# Patient Record
Sex: MALE | Race: WHITE | Employment: FULL TIME | URBAN - METROPOLITAN AREA
[De-identification: names, ages, dates, MRNs, and addresses within clinical notes are randomized per-mention and may not be internally consistent; named-entity substitution may affect disease eponyms.]

---

## 2017-01-11 ENCOUNTER — OFFICE VISIT (OUTPATIENT)
Dept: VASCULAR SURGERY | Age: 57
End: 2017-01-11

## 2017-01-11 VITALS
WEIGHT: 191 LBS | RESPIRATION RATE: 22 BRPM | SYSTOLIC BLOOD PRESSURE: 140 MMHG | HEIGHT: 70 IN | HEART RATE: 96 BPM | BODY MASS INDEX: 27.35 KG/M2 | DIASTOLIC BLOOD PRESSURE: 82 MMHG

## 2017-01-11 DIAGNOSIS — I10 ESSENTIAL HYPERTENSION: ICD-10-CM

## 2017-01-11 DIAGNOSIS — I77.9 AORTO-ILIAC DISEASE (HCC): ICD-10-CM

## 2017-01-11 DIAGNOSIS — I70.213 ATHEROSCLEROSIS OF NATIVE ARTERY OF BOTH LOWER EXTREMITIES WITH INTERMITTENT CLAUDICATION (HCC): Primary | ICD-10-CM

## 2017-01-11 DIAGNOSIS — Z87.891 FORMER SMOKER: ICD-10-CM

## 2017-01-11 DIAGNOSIS — M48.061 SPINAL STENOSIS, LUMBAR REGION, WITHOUT NEUROGENIC CLAUDICATION: ICD-10-CM

## 2017-01-11 NOTE — MR AVS SNAPSHOT
Visit Information Date & Time Provider Department Dept. Phone Encounter #  
 1/11/2017  1:00 PM Alina Hanley, [address] [facility] [phone] [id number] Your Appointments 1/18/2017  8:40 AM  
Follow Up with Kalpesh Mason MD  
[facility] [place] ([facility]) Appt Note: 4 week f/u for lower back  75.00cp 2012 [facility] [address]  
[phone]  
  
   
 2012 [facility]  
  
    
 2/10/2017  1:00 PM  
ROUTINE CARE with Danita Gerber MD  
[facility] ([facility]) Appt Note: 4 month F/U  
 [address] 500 [facility] [address] 200 [facility]  
  
    
 2/13/2017  9:30 AM  
Follow Up with Shweta Garnett MD  
[facility] ([facility] SCHEDULING) Appt Note: FU AFTER CTA AND LEG AT [facility] ON 1/24/17  
 [address] [facility] [address] [place] [address] [address] [address]  
[phone]  
  
   
 [facility] [address] [address] Upcoming Health Maintenance Date Due COLONOSCOPY 7/23/2020 DTaP/Tdap/Td series (2 - Td) 7/5/2026 Allergies as of 1/11/2017  Review Complete On: 1/11/2017 By: Curtis Hernández LPN No Known Allergies Current Immunizations  Reviewed on 7/5/2016 Name Date Tdap 7/5/2016 Not reviewed this visit You Were Diagnosed With   
  
 Codes Comments Atherosclerosis of native artery of both lower extremities with intermittent claudication ([facility]    -  Primary ICD-10-CM: C17.115 ICD-9-CM: 440.21 Aorto-iliac disease ([facility]     ICD-10-CM: I74.09 
ICD-9-CM: 444.09 Vitals BP Pulse Resp Height(growth percentile) Weight(growth percentile) BMI  
 140/82 (BP 1 Location: Left arm, BP Patient Position: Sitting) 96 22 5' 10\" (1.778 m) 191 lb (86.6 kg) 27.41 kg/m2 Smoking Status Former Smoker Vitals History BMI and BSA Data Body Mass Index Body Surface Area  
 27.41 kg/m 2 2.07 m 2 Preferred Pharmacy Pharmacy Name Phone [facility], [address] Your Updated Medication List  
  
   
This list is accurate as of: 1/11/17  1:33 PM.  Always use your most recent med list. amLODIPine 10 mg tablet Commonly known as:  Alise Darting Take 1 Tab by mouth daily. avanafil 100 mg tablet Commonly known as:  SUMLWPY Take 1 Tab by mouth as needed for Other. Take 1 tab 15-30 mins prior to sexual acitivity  
  
 cyanocobalamin 1,000 mcg tablet Commonly known as:  VITAMIN B-12 Take 1 Tab by mouth daily. DULoxetine 60 mg capsule Commonly known as:  CYMBALTA Take 1 Cap by mouth daily. hydroCHLOROthiazide 25 mg tablet Commonly known as:  HYDRODIURIL Take 1 Tab by mouth daily. losartan 100 mg tablet Commonly known as:  COZAAR Take 1 Tab by mouth daily. methylPREDNISolone 4 mg tablet Commonly known as:  MEDROL (SHAHEEN) Per dose pack instructions POTASSIUM PHOSPHATE PO Take  by mouth. traMADol 50 mg tablet Commonly known as:  ULTRAM  
Take 1 Tab by mouth every six (6) hours as needed for Pain. Max Daily Amount: 200 mg. To-Do List   
 01/13/2017 Imaging:  CTA ABD ART W RUNOFF W WO CONT   
  
 01/13/2017 Imaging:  LOWER EXT ART PVR MULT LEVEL SEG PRESSURES   
  
 01/24/2017 9:00 AM  
  Appointment with [facility] 1 at [facility] ([phone]) DIET RESTRICTIONS  Nothing to eat or drink 4 hours prior to study May have water to take meds  GENERAL INSTRUCTIONS  If you were given medications to take for a contrast allergy prior to having this study, please arrange to have someone drive you to your appointment.  If you have had a creatinine level drawn within the past 30 days, please bring most recent results to your appt. This study does not require you to drink contrast prior to your study. MEDICATIONS  Bring a complete list of all medications you are currently taking to include prescriptions, over-the-counter meds, herbals, vitamins & any dietary supplements. OUTSIDE FILMS  Bring outside films, CDs, and reports related to the study with you on the day of your exam.  QUESTIONS  Notify the CT Department if you have any questions concerning your study. Gregory Patel [address] [place] [address] [phone]  
  
 01/24/2017 10:00 AM  
  Appointment with [facility] at [facility] ([phone]) All patients under the age of 15 should be referred to [address] [place] [address]. No preparation is required for this study. Patient can have their meals and take their medications. Please fax prescription/order to [facility] - [phone]. Please report to the Registration Department located in the [facility], [address] [place] [address], [address] [place] [address] at least 15 minutes prior to your appointment time. Referral Information Referral ID Referred By Referred To  
  
 [id number] Reynold Counts L Not Available Visits Status Start Date End Date 1 New Request 1/11/17 1/11/18 If your referral has a status of pending review or denied, additional information will be sent to support the outcome of this decision. Introducing hospitals & HEALTH SERVICES! Lon Lopez introduces [employer] patient portal. Now you can access parts of your medical record, email your doctor's office, and request medication refills online. 1. In your internet browser, go to [email or web address] 2. Click on the First Time User? Click Here link in the Sign In box. You will see the New Member Sign Up page. 3. Enter your [employer] Access Code exactly as it appears below. You will not need to use this code after youve completed the sign-up process.  If you do not sign up before the expiration date, you must request a new code. · [email or web address] Access Code: [email or web address] Expires: 4/11/2017 12:44 PM 
 
4. Enter the last four digits of your Social Security Number (xxxx) and Date of Birth (mm/dd/yyyy) as indicated and click Submit. You will be taken to the next sign-up page. 5. Create a [email or web address] ID. This will be your [email or web address] login ID and cannot be changed, so think of one that is secure and easy to remember. 6. Create a [email or web address] password. You can change your password at any time. 7. Enter your Password Reset Question and Answer. This can be used at a later time if you forget your password. 8. Enter your e-mail address. You will receive e-mail notification when new information is available in [address] 9. Click Sign Up. You can now view and download portions of your medical record. 10. Click the Download Summary menu link to download a portable copy of your medical information. If you have questions, please visit the Frequently Asked Questions section of the [email or web address] website. Remember, [email or web address] is NOT to be used for urgent needs. For medical emergencies, dial 911. Now available from your iPhone and Android! Please provide this summary of care documentation to your next provider. Your primary care clinician is listed as Adriana Cooper. If you have any questions after today's visit, please call [phone].

## 2017-01-11 NOTE — PROGRESS NOTES
Jair Rao    Chief Complaint   Patient presents with   [facility] Patient       History and Physical    Mr Pop Agustin is referred by his physiatrist after findings on an MRI of probable aorto iliac disease  He has had several years of pain with ambulation. He could walk unlimited distances without problems but over the past couple of years has had progressive debility due to pain in legs. It is described as being in his calves but can be felt throughout the entire leg. He said the muscles just get tired and cramp. He can stop and sit and after a few minutes it resolves. It started on the left initially but has progressed to now involve the right leg. He is a heavy  and has had to limit his walking on the job due to his limitations. Fortunately his employer has been accommodating. But he can now only tolerate about < 1/2 block of ambulation    He does have true lumbar spinal stenosis but the MRI did suggest these vascular findings prompting this visit  He confesses he smoked for over 40 years, but he did in fact quit about 7 years ago. He is treated for HTN but denies diabetes or high cholesterol.  He denies any heart problems    Past Medical History   Diagnosis Date    Essential hypertension     Lumbar canal stenosis 05/04/2015     Dr. Ro Suresh     Patient Active Problem List   Diagnosis Code    Hypertension I10    Vitamin B12 deficiency E53.8    Low back pain M54.5    DDD (degenerative disc disease), lumbar M51.36    Erectile dysfunction N52.9    Lumbar neuritis M54.16    Spinal stenosis, lumbar region, without neurogenic claudication M48.06    Hereditary peripheral neuropathy G60.9    Spinal stenosis, lumbar M48.06    Aortic valve stenosis I35.0    Hereditary and idiopathic neuropathy, unspecified G60.9    Radiculopathy, lumbar region M54.16     Past Surgical History   Procedure Laterality Date    Hx colonoscopy  07/23/2015     polyps repeat 2020 5 years Tom Henderson     Current Outpatient Prescriptions   Medication Sig Dispense Refill    DULoxetine (CYMBALTA) 60 mg capsule Take 1 Cap by mouth daily. 30 Cap 1    traMADol (ULTRAM) 50 mg tablet Take 1 Tab by mouth every six (6) hours as needed for Pain. Max Daily Amount: 200 mg. 20 Tab 0    methylPREDNISolone (MEDROL, SHAHEEN,) 4 mg tablet Per dose pack instructions 1 Dose Pack 0    amLODIPine (NORVASC) 10 mg tablet Take 1 Tab by mouth daily. 90 Tab 3    cyanocobalamin (VITAMIN B-12) 1,000 mcg tablet Take 1 Tab by mouth daily. 90 Tab 3    hydrochlorothiazide (HYDRODIURIL) 25 mg tablet Take 1 Tab by mouth daily. 90 Tab 3    losartan (COZAAR) 100 mg tablet Take 1 Tab by mouth daily. 90 Tab 3    avanafil (STENDRA) 100 mg tablet Take 1 Tab by mouth as needed for Other. Take 1 tab 15-30 mins prior to sexual acitivity 12 Tab 0    POTASSIUM PHOS,M-BASIC-D-BASIC (POTASSIUM PHOSPHATE PO) Take  by mouth. No Known Allergies  Social History     Social History    Marital status: LEGALLY      Spouse name: N/A    Number of children: N/A    Years of education: N/A     Occupational History    Not on file. Social History Main Topics    Smoking status: Former Smoker    Smokeless tobacco: Never Used    Alcohol use 15.0 oz/week     30 Cans of beer per week      Comment: 6 beers a day    Drug use:  Yes    Sexual activity: Yes     Partners: Female     Other Topics Concern    Not on file     Social History Narrative      Family History   Problem Relation Age of Onset    Heart Disease Father        Review of Systems    Review of Systems - History obtained from the patient  General ROS: negative  Psychological ROS: negative  Ophthalmic ROS: negative  Respiratory ROS: negative  Cardiovascular ROS: negative  Gastrointestinal ROS: negative  Musculoskeletal ROS: radiculopathy from lumbar stenosis  Neurological ROS: peripheral neuropathy  Dermatological ROS: negative  Vascular ROS: claudication as per HPI        Physical Exam:    Visit Vitals    BP 140/82 (BP 1 Location: Left arm, BP Patient Position: Sitting)    Pulse 96    Resp 22    Ht 5' 10\" (1.778 m)    Wt 191 lb (86.6 kg)    BMI 27.41 kg/m2      General:  Alert, cooperative, no distress. Head:  Normocephalic, without obvious abnormality, atraumatic. Eyes:    Conjunctivae/corneas clear. Pupils equal, round, reactive to light. Extraocular movements intact. Neck:         No obvious bruits, no JVD   Lungs:   Clear to auscultation bilaterally. Heart:  Regular rate and rhythm, S1, S2 normal   Extremities: Extremities normal, atraumatic, no cyanosis or edema. Pulses: Distal pulses nonpalpable. Skin: Skin color, texture, turgor normal. No rashes or lesions. Vascular studies:  MRI: Significant aortic stenosis which is progressed since prior imaging. There  is possible occlusion of the left common iliac artery which may been present  previously. Correlation with dedicated imaging of the aorta is recommended such  as CTA    Impression and Plan:  1. Atherosclerosis of native artery of both lower extremities with intermittent claudication ([address]    2. Aorto-iliac disease ([address]    3. Essential hypertension    4. Former smoker    5. Spinal stenosis, lumbar region, without neurogenic claudication      Orders Placed This Encounter    CTA ABD ART W RUNOFF W WO CONT    LOWER EXT ART PVR MULT LEVEL SEG PRESSURES     Clinical symptoms discussed and correlated to PAD. Given age, smoking history, and findings on MRI, seems typical aorto - iliac occlusive disease  He has had no actual vascular studies to determine true severity of disease so will get leg arterial study but also do CTA with runoff. May likely do better long term with aortic bypass but need visualization with angiographic images and CTA should offer suitable evaluation for surgical planning. I did discuss endovascular options as a possibility as well.  Further discussion/recommendations though pending studies, at which point will have him follow up with Dr Karla Lau. Literature was provided today. I did suggest starting to take daily 81mg chewable aspirin. JUNE Casillas    Portions of this note have been created using voice recognition software.

## 2017-01-24 ENCOUNTER — HOSPITAL ENCOUNTER (OUTPATIENT)
Dept: VASCULAR SURGERY | Age: 57
Discharge: HOME | End: 2017-01-24
Attending: PHYSICIAN ASSISTANT
Payer: COMMERCIAL

## 2017-01-24 ENCOUNTER — HOSPITAL ENCOUNTER (OUTPATIENT)
Dept: CT IMAGING | Age: 57
Discharge: HOME | End: 2017-01-24
Attending: PHYSICIAN ASSISTANT
Payer: COMMERCIAL

## 2017-01-24 DIAGNOSIS — I77.9 AORTO-ILIAC DISEASE (HCC): ICD-10-CM

## 2017-01-24 DIAGNOSIS — I70.213 ATHEROSCLEROSIS OF NATIVE ARTERY OF BOTH LOWER EXTREMITIES WITH INTERMITTENT CLAUDICATION (HCC): ICD-10-CM

## 2017-01-24 LAB — CREAT UR-MCNC: 0.8 MG/DL (ref 0.6–1.3)

## 2017-01-24 PROCEDURE — 74011636320 HC RX REV CODE- 636/320: Performed by: PHYSICIAN ASSISTANT

## 2017-01-24 PROCEDURE — 75635 CT ANGIO ABDOMINAL ARTERIES: CPT

## 2017-01-24 PROCEDURE — 93925 LOWER EXTREMITY STUDY: CPT

## 2017-01-24 PROCEDURE — 82565 ASSAY OF CREATININE: CPT

## 2017-01-24 RX ADMIN — IOPAMIDOL 120 ML: 755 INJECTION, SOLUTION INTRAVENOUS at 10:09

## 2017-01-25 ENCOUNTER — OFFICE VISIT (OUTPATIENT)
Dept: VASCULAR SURGERY | Age: 57
End: 2017-01-25

## 2017-01-25 VITALS
HEIGHT: 70 IN | WEIGHT: 191 LBS | SYSTOLIC BLOOD PRESSURE: 140 MMHG | DIASTOLIC BLOOD PRESSURE: 88 MMHG | BODY MASS INDEX: 27.35 KG/M2 | HEART RATE: 100 BPM | RESPIRATION RATE: 22 BRPM

## 2017-01-25 DIAGNOSIS — I70.213 ATHEROSCLEROSIS OF NATIVE ARTERY OF BOTH LOWER EXTREMITIES WITH INTERMITTENT CLAUDICATION (HCC): ICD-10-CM

## 2017-01-25 DIAGNOSIS — I74.09 AORTOILIAC OCCLUSIVE DISEASE (HCC): Primary | ICD-10-CM

## 2017-01-25 DIAGNOSIS — I73.9 PAD (PERIPHERAL ARTERY DISEASE) (HCC): ICD-10-CM

## 2017-01-25 NOTE — PROGRESS NOTES
Richelle Chapa    Chief Complaint   Patient presents with    Leg Pain       History and Physical    Richelle Chapa is a 60 y.o. male with bilateral lower extremity claudication. As well as back pain. He has no fevers or chills. No rest pain. No ulcerations. Overall he is very worried about his current status. He is here for discussion of his CTA. Past Medical History   Diagnosis Date    Essential hypertension     Lumbar canal stenosis 05/04/2015     Dr. Almyra Castleman     Past Surgical History   Procedure Laterality Date    Hx colonoscopy  07/23/2015     polyps repeat 2020 5 years Jovanny Henderson      Patient Active Problem List   Diagnosis Code    Hypertension I10    Vitamin B12 deficiency E53.8    Low back pain M54.5    DDD (degenerative disc disease), lumbar M51.36    Erectile dysfunction N52.9    Lumbar neuritis M54.16    Spinal stenosis, lumbar region, without neurogenic claudication M48.06    Hereditary peripheral neuropathy G60.9    Spinal stenosis, lumbar M48.06    Aortic valve stenosis I35.0    Hereditary and idiopathic neuropathy, unspecified G60.9    Radiculopathy, lumbar region M54.16    Aortoiliac occlusive disease ([facility] I74.09    Atherosclerosis of native artery of both lower extremities with intermittent claudication ([facility] [address] [facility] I70.213    PAD (peripheral artery disease) ([facility]) I73.9     Current Outpatient Prescriptions   Medication Sig Dispense Refill    DULoxetine (CYMBALTA) 60 mg capsule Take 1 Cap by mouth daily. 30 Cap 1    traMADol (ULTRAM) 50 mg tablet Take 1 Tab by mouth every six (6) hours as needed for Pain. Max Daily Amount: 200 mg. 20 Tab 0    methylPREDNISolone (MEDROL, SHAHEEN,) 4 mg tablet Per dose pack instructions 1 Dose Pack 0    amLODIPine (NORVASC) 10 mg tablet Take 1 Tab by mouth daily. 90 Tab 3    cyanocobalamin (VITAMIN B-12) 1,000 mcg tablet Take 1 Tab by mouth daily. 90 Tab 3    hydrochlorothiazide (HYDRODIURIL) 25 mg tablet Take 1 Tab by mouth daily.  90 Tab 3    losartan (COZAAR) 100 mg tablet Take 1 Tab by mouth daily. 90 Tab 3    avanafil (STENDRA) 100 mg tablet Take 1 Tab by mouth as needed for Other. Take 1 tab 15-30 mins prior to sexual acitivity 12 Tab 0    POTASSIUM PHOS,M-BASIC-D-BASIC (POTASSIUM PHOSPHATE PO) Take  by mouth. No Known Allergies  Social History     Social History    Marital status: LEGALLY      Spouse name: N/A    Number of children: N/A    Years of education: N/A     Occupational History    Not on file. Social History Main Topics    Smoking status: Former Smoker    Smokeless tobacco: Never Used    Alcohol use 15.0 oz/week     30 Cans of beer per week      Comment: 6 beers a day    Drug use: Yes    Sexual activity: Yes     Partners: Female     Other Topics Concern    Not on file     Social History Narrative      Family History   Problem Relation Age of Onset    Heart Disease Father        Physical Exam:    Visit Vitals    /88 (BP 1 Location: Left arm, BP Patient Position: Sitting)    Pulse 100    Resp 22    Ht 5' 10\" (1.778 m)    Wt 191 lb (86.6 kg)    BMI 27.41 kg/m2      General: Well-appearing male in no acute distress  HEENT: EOMI no scleral icterus is noted  Pulmonary: No increased work of breathing is noted  Extremities: Warm and perfused bilaterally no distal pulses are palpated bilaterally no ulcerations are identified  Neuro: Cranial nerves II through XII are grossly intact    Impression and Plan:  Faviola Owusu is a 60 y.o. pleasant male with aortoiliac occlusive disease. As well as bilateral lower extremity claudication. I have reviewed his CTA as well as both of his duplexes. This shows significant arterial inflow disease. He has about a 50-75% narrowing of his infrarenal aorta as well as small aneurysm measuring 2.9 x 2.9 cm.   With left common iliac artery occlusion and near occlusion of the right common iliac artery he has a total occlusion of the right external iliac artery and total occlusion of the left external iliac artery he has some minor common femoral artery disease. As well as a lot of distal artery disease within the SFA and popliteal and trifurcation bilaterally. But nowhere near the amount within his aortoiliac segment. I had a long discussion with the patient over different ways of fixing this problem. We talked about aortobifemoral bypass as well as axillary bifemoral bypass. I told him with his young age and overall health that the better procedure to go through would be the aortobifemoral bypass we did go over a lot of the risks and benefits of the surgery of both of these including but not limited to bleeding, infection, damage to adjacent structures, MI, stroke, death, loss of lower extremity, intestinal issue, kidney issue. Patient is aware of all of these issues I did offer him time to talk with his family and make a decision on the surgery on his terms. But the patient wants this taken care of immediately he is having so much problem with walking that he needs to have something done so that he can feel better he wants this done as soon as possible. We will get him to have cardiac clearance prior to any surgery. But we will move forward with an aortobifemoral bypass likely through an end-to-end approach given his aneurysmal degeneration. We reviewed the plan with the patient and the patient understands. Follow-up Disposition: Not on [facility], MD    PLEASE NOTE:  This document has been produced using voice recognition software. Unrecognized errors in transcription may be present.

## 2017-01-25 NOTE — MR AVS SNAPSHOT
Visit Information Date & Time Provider Department Dept. Phone Encounter #  
 1/25/2017  9:00 AM Ana Castellanos, [address] [facility] [phone] [id number] Follow-up Instructions Follow-up and Disposition History Your Appointments 2/1/2017  8:40 AM  
New Patient with Cinthya Sosa MD  
[facility] ([address]) Appt Note: New patient refer by [facility], DX: cardiac eval for upcoming surg. aortic/[phone] [facility] [id number]  
[phone] 2300 [facility] [address] [address]  
  
    
 2/17/2017  8:35 AM  
Follow Up with Michael Dietz MD  
914 [facility], [address] [facility] [place] ([address] [place] [address]) Appt Note: 4 week f/u for lower back  75.00cp; 01/16/17 CX APPT DR. ARRINGTON NOT HAVING CT OTHER TEST BY VEIN/VASCULAR TILL 01/2417 FU IS 02/13/17.; BACK FU FROM VEIN AND VAS PHYSICIAN  
 [address]  
  
    
 2/22/2017  9:45 AM  
HOSPITAL DISCHARGE with Ana Castellanos MD  
 [facility] ([address]) Appt Note: DC aortobifem bypass [address] [address]  
[phone]  
  
   
 [facility] [address]  
  
    
 2/28/2017  9:15 AM  
ROUTINE CARE with Dexter Mosqueda MD  
920 [facility] ([address]) Appt Note: 4 month F/U; =; =  
 [address] [facility] [address] [facility] [address]  
[address] [facility] [address] [facility] [address] Upcoming Health Maintenance Date Due COLONOSCOPY 7/23/2020 DTaP/Tdap/Td series (2 - Td) 7/5/2026 Allergies as of 1/25/2017  Review Complete On: 1/25/2017 By: Ana Castellanos MD  
 No Known Allergies Current Immunizations  Reviewed on 7/5/2016 Name Date Tdap 7/5/2016 Not reviewed this visit You Were Diagnosed With   
  
 Codes Comments Aortoiliac occlusive disease ([facility]    -  Primary ICD-10-CM: I74.09 
ICD-9-CM: 444.09 Atherosclerosis of native artery of both lower extremities with intermittent claudication ([facility]     ICD-10-CM: F44.117 ICD-9-CM: 440.21 PAD (peripheral artery disease) ([facility])     ICD-10-CM: I73.9 ICD-9-CM: 443. 9 Vitals BP Pulse Resp Height(growth percentile) Weight(growth percentile) BMI  
 140/88 (BP 1 Location: Left arm, BP Patient Position: Sitting) 100 22 5' 10\" (1.778 m) 191 lb (86.6 kg) 27.41 kg/m2 Smoking Status Former Smoker Vitals History BMI and BSA Data Body Mass Index Body Surface Area  
 27.41 kg/m 2 2.07 m 2 Preferred Pharmacy Pharmacy Name Phone 4905 [facility], [address] Your Updated Medication List  
  
   
This list is accurate as of: 1/25/17 10:05 AM.  Always use your most recent med list. amLODIPine 10 mg tablet Commonly known as:  Guzman Chafe Take 1 Tab by mouth daily. avanafil 100 mg tablet Commonly known as:  PLUHCUQ Take 1 Tab by mouth as needed for Other. Take 1 tab 15-30 mins prior to sexual acitivity  
  
 cyanocobalamin 1,000 mcg tablet Commonly known as:  VITAMIN B-12 Take 1 Tab by mouth daily. DULoxetine 60 mg capsule Commonly known as:  CYMBALTA Take 1 Cap by mouth daily. hydroCHLOROthiazide 25 mg tablet Commonly known as:  HYDRODIURIL Take 1 Tab by mouth daily. losartan 100 mg tablet Commonly known as:  COZAAR Take 1 Tab by mouth daily. methylPREDNISolone 4 mg tablet Commonly known as:  MEDROL (SHAHEEN) Per dose pack instructions POTASSIUM PHOSPHATE PO Take  by mouth. traMADol 50 mg tablet Commonly known as:  ULTRAM  
Take 1 Tab by mouth every six (6) hours as needed for Pain. Max Daily Amount: 200 mg. To-Do List   
 Around 02/24/2017 Lab:  CBC W/O DIFF Around 02/24/2017 ECG:  EKG, 12 LEAD, INITIAL Around 02/24/2017 Lab:  METABOLIC PANEL, BASIC Around 02/24/2017 Imaging:  XR CHEST PA LAT Introducing [facility]! Romayne Duster introduces [employer] patient portal. Now you can access parts of your medical record, email your doctor's office, and request medication refills online. 1. In your internet browser, go to [email or web address] 2. Click on the First Time User? Click Here link in the Sign In box. You will see the New Member Sign Up page. 3. Enter your [employer] Access Code exactly as it appears below. You will not need to use this code after youve completed the sign-up process. If you do not sign up before the expiration date, you must request a new code. · [employer] Access Code: [email or web address] Expires: 4/11/2017 12:44 PM 
 
4. Enter the last four digits of your Social Security Number (xxxx) and Date of Birth (mm/dd/yyyy) as indicated and click Submit. You will be taken to the next sign-up page. 5. Create a [employer] ID. This will be your [employer] login ID and cannot be changed, so think of one that is secure and easy to remember. 6. Create a [employer] password. You can change your password at any time. 7. Enter your Password Reset Question and Answer. This can be used at a later time if you forget your password. 8. Enter your e-mail address. You will receive e-mail notification when new information is available in [address] 9. Click Sign Up. You can now view and download portions of your medical record. 10. Click the Download Summary menu link to download a portable copy of your medical information. If you have questions, please visit the Frequently Asked Questions section of the [employer] website. Remember, [employer] is NOT to be used for urgent needs. For medical emergencies, dial 911. Now available from your iPhone and Android! Please provide this summary of care documentation to your next provider. Your primary care clinician is listed as Alise Gifford. If you have any questions after today's visit, please call [phone].

## 2017-02-01 ENCOUNTER — OFFICE VISIT (OUTPATIENT)
Dept: CARDIOLOGY CLINIC | Age: 57
End: 2017-02-01

## 2017-02-01 VITALS
RESPIRATION RATE: 18 BRPM | SYSTOLIC BLOOD PRESSURE: 144 MMHG | HEIGHT: 70 IN | DIASTOLIC BLOOD PRESSURE: 90 MMHG | HEART RATE: 158 BPM | OXYGEN SATURATION: 96 %

## 2017-02-01 DIAGNOSIS — I48.91 ATRIAL FIBRILLATION, UNSPECIFIED TYPE (HCC): ICD-10-CM

## 2017-02-01 DIAGNOSIS — I70.213 ATHEROSCLEROSIS OF NATIVE ARTERY OF BOTH LOWER EXTREMITIES WITH INTERMITTENT CLAUDICATION (HCC): ICD-10-CM

## 2017-02-01 DIAGNOSIS — R94.31 ABNORMAL EKG: ICD-10-CM

## 2017-02-01 DIAGNOSIS — I35.0 AORTIC VALVE STENOSIS, UNSPECIFIED ETIOLOGY: ICD-10-CM

## 2017-02-01 DIAGNOSIS — Z01.818 PRE-OP EXAM: Primary | ICD-10-CM

## 2017-02-01 RX ORDER — METOPROLOL TARTRATE 50 MG/1
50 TABLET ORAL 2 TIMES DAILY
Qty: 60 TAB | Refills: 3 | Status: SHIPPED | OUTPATIENT
Start: 2017-02-01 | End: 2017-02-26 | Stop reason: SDUPTHER

## 2017-02-01 NOTE — PROGRESS NOTES
PATIENT NAME: Amy Llanos         60 y.o.      1960              DATE:2/1/2017    REASON FOR VISIT: Preoperative evaluation    HISTORY OF PRESENT ILLNESS: Anticipating peripheral vascular surgery. An EKG done in the office today shows atrial fibrillation with a rapid ventricular response and a possible prior anteroseptal myocardial infarction age-indeterminate. The patient has not had previous EKGs. Patient denies a history of coronary artery disease. Denies a history of valvular heart disease and congestive heart failure. The patient is hypertensive. He is not diabetic. He denies lipid abnormalities. He quit cigarette smoking 6 years ago. Activity is limited by claudication in both lower extremities. Denies chest pain and other symptoms suggestive of angina. Denies dyspnea on exertion, orthopnea, paroxysmal nocturnal dyspnea. Denies palpitation, syncope, presyncope. Denies edema in the lower extremities. PAST MEDICAL HISTORY:   Past Medical History:    Essential hypertension                                        Lumbar canal stenosis                           05/04/2015      Comment:Dr. Perry    PAST SURGICAL HISTORY:   Past Surgical History:    HX COLONOSCOPY                                   07/23/2015      Comment:polyps repeat 2020 5 years Luis Alberto Henderson      SOCIAL HISTORY:  Social History    Marital status: LEGALLY    Spouse name:                       Years of education:                 Number of children:               Social History Main Topics    Smoking status: Former Smoker                                                                Packs/day: 0.00      Years: 0.00        Smokeless status: Never Used                        Comment: quit in 2011    Alcohol use: Yes           15.0 oz/week       30 Cans of beer per week       Comment: 6 beers a day    Drug use:  Yes                Special: Marijuana       Comment: occasionally    Sexual activity: Yes Partners with: Female        ALLERGIES:   No Known Allergies     CURRENT MEDICATIONS:   Current Outpatient Prescriptions:  metoprolol tartrate (LOPRESSOR) 50 mg tablet, Take 1 Tab by mouth two (2) times a day. DULoxetine (CYMBALTA) 60 mg capsule, Take 1 Cap by mouth daily. amLODIPine (NORVASC) 10 mg tablet, Take 1 Tab by mouth daily. cyanocobalamin (VITAMIN B-12) 1,000 mcg tablet, Take 1 Tab by mouth daily. hydrochlorothiazide (HYDRODIURIL) 25 mg tablet, Take 1 Tab by mouth daily. losartan (COZAAR) 100 mg tablet, Take 1 Tab by mouth daily. POTASSIUM PHOS,M-BASIC-D-BASIC (POTASSIUM PHOSPHATE PO), Take  by mouth. avanafil (STENDRA) 100 mg tablet, Take 1 Tab by mouth as needed for Other. Take 1 tab 15-30 mins prior to sexual acitivity    No current facility-administered medications for this visit. REVIEW of SYSTEMS:History obtained from chart review and the patient  General ROS: negative for - weight gain or weight loss  Hematological and Lymphatic ROS: negative for - bleeding problems  Respiratory ROS: no cough, shortness of breath, or wheezing  Cardiovascular ROS: Please see history of present illness  Gastrointestinal ROS: no abdominal pain, change in bowel habits, or black or bloody stools  Neurological ROS: no TIA or stroke symptoms     PHYSICAL EXAMINATION:   /90  Pulse (!) 158  Resp 18  Ht 5' 10\" (1.778 m)  SpO2 96%  BP Readings from Last 3 Encounters:  02/01/17 : 144/90  01/25/17 : 140/88  01/11/17 : 140/82    Pulse Readings from Last 3 Encounters:  02/01/17 : (!) 158  01/25/17 : 100  01/11/17 : 96    Wt Readings from Last 3 Encounters:  01/27/17 : 86.6 kg (191 lb)  01/25/17 : 86.6 kg (191 lb)  01/11/17 : 86.6 kg (191 lb)    General: Well-developed white male no apparent distress. HEENT: Sclera clear. Mucous membranes pink and moist.  Neck: No jugular venous distention. Carotid upstrokes 2+ without bruits. Chest: Clear to percussion and auscultation. Heart: PMI not palpable.   irregular rhythm. No murmur or gallop. Abdomen: Nontender without masses or organomegaly. Extremities: No edema. Dorsalis pedis and posterior tibial pulses not palpable. Skin: Warm and dry. No stasis changes. Neuro: Alert, oriented, speech WNL, no facial asymmetry. Gait WNL. EKG: Atrial fibrillation, heart rate 153. Possible anteroseptal myocardial infarction age-indeterminate. Nonspecific ST-T changes. IMPRESSION:   Atrial fibrillation with a rapid ventricular response  Possible prior anteroseptal myocardial infarction  Peripheral vascular disease with claudication limiting activity    PLAN:  Metoprolol 50 mg p.o. twice daily  Echocardiography  Pharmacologic stress testing and Cardiolite scan  Surgical clearance to hinge on the results of these studies  Continue aspirin daily. The diagnoses and plan were discussed with patient. All questions answered. Plan of care agreed to by all concerned. Tulio Dixon MD       ,

## 2017-02-01 NOTE — MR AVS SNAPSHOT
Visit Information Date & Time Provider Department Dept. Phone Encounter #  
 2/1/2017  1:00 PM Nahomy Owusu MD [facility] [phone] Follow-up Instructions Follow-up and Disposition History Your Appointments 2/17/2017  8:35 AM  
Follow Up with Hayden Meyer MD  
[facility] ([facility] Appt Note: 4 week f/u for lower back  75.00cp; 01/16/17 CX APPT DR. ARRINGTON NOT HAVING CT OTHER TEST BY VEIN/VASCULAR TILL 01/2417 FU IS 02/13/17.; BACK FU FROM VEIN AND VAS PHYSICIAN  
 2012 [facility] [address] [facility]  
  
    
 2/22/2017  9:45 AM  
HOSPITAL DISCHARGE with Simón Shearer MD  
[facility] ([facility] [place]) Appt Note: DC aortobifem bypass [address] [address]  
[phone]  
  
   
 [facility] [address]  
  
    
 2/28/2017  9:15 AM  
ROUTINE CARE with Samy العرايق MD  
[facility] ([facility]) Appt Note: 4 month F/U; =; =  
 [address] [facility] [address]  
[facility] [address] 160[address] [facility] [address] [facility] [address] Upcoming Health Maintenance Date Due COLONOSCOPY 7/23/2020 DTaP/Tdap/Td series (2 - Td) 7/5/2026 Allergies as of 2/1/2017  Review Complete On: 2/1/2017 By: Nahomy Owusu MD  
 No Known Allergies Current Immunizations  Reviewed on 7/5/2016 Name Date Tdap 7/5/2016 Not reviewed this visit You Were Diagnosed With   
  
 Codes Comments Pre-op exam    -  Primary ICD-10-CM: M56.101 ICD-9-CM: V72.84 Aortic valve stenosis, unspecified etiology     ICD-10-CM: I35.0 ICD-9-CM: 424.1  Atrial fibrillation, unspecified type ([facility]     ICD-10-CM: I48.91 
ICD-9-CM: 427.31   
 Abnormal EKG     ICD-10-CM: R94.31 
ICD-9-CM: 794.31 Atherosclerosis of native artery of both lower extremities with intermittent claudication ([facility]     ICD-10-CM: J33.054 ICD-9-CM: 440.21 Vitals BP Pulse Resp Height(growth percentile) SpO2 Smoking Status 144/90 (!) 158 18 5' 10\" (1.778 m) 96% Former Smoker Preferred Pharmacy Pharmacy Name Phone [address] [facility], [address] [place], [place] [address] Your Updated Medication List  
  
   
This list is accurate as of: 2/1/17  1:43 PM.  Always use your most recent med list. amLODIPine 10 mg tablet Commonly known as:  Tad Jamie Take 1 Tab by mouth daily. avanafil 100 mg tablet Commonly known as:  EXAQOQF Take 1 Tab by mouth as needed for Other. Take 1 tab 15-30 mins prior to sexual acitivity  
  
 cyanocobalamin 1,000 mcg tablet Commonly known as:  VITAMIN B-12 Take 1 Tab by mouth daily. DULoxetine 60 mg capsule Commonly known as:  CYMBALTA Take 1 Cap by mouth daily. hydroCHLOROthiazide 25 mg tablet Commonly known as:  HYDRODIURIL Take 1 Tab by mouth daily. losartan 100 mg tablet Commonly known as:  COZAAR Take 1 Tab by mouth daily. metoprolol tartrate 50 mg tablet Commonly known as:  LOPRESSOR Take 1 Tab by mouth two (2) times a day. POTASSIUM PHOSPHATE PO Take  by mouth. Prescriptions Sent to Pharmacy Refills  
 metoprolol tartrate (LOPRESSOR) 50 mg tablet 3 Sig: Take 1 Tab by mouth two (2) times a day. Class: Normal  
 Pharmacy: [facility] [facility], [address] [place] [address] Ph #: [phone] Route: Oral  
  
We Performed the Following AMB POC EKG ROUTINE W/ 12 LEADS, INTER & REP [80612 CPT(R)] To-Do List   
 02/01/2017 ECHO:  2D ECHO COMPLETE ADULT (TTE) W OR WO CONTR   
  
 02/01/2017 ECG:  CARDIAC SPECT REST AND STRESS   
  
 02/01/2017 ECG:  NUCLEAR STRESS TEST   
  
 02/09/2017 7:15 AM  
  Appointment with HBV NUC CARD ROOM at [facility] NON-INVASIVE CARD ([phone]) This is a 2-part test which takes approximately 4 hours to complete. Please see part 2 of exam below for full instructions 02/09/2017 7:45 AM  
  Appointment with HBV NUC CARD ROOM at [facility] NON-INVASIVE CARD ([phone]) 1-No eating or coffee after midnight  2-Do not take diabetic meds (bring with) 3-Please take all other meds unless specified by cardiology 02/09/2017 9:00 AM  
  Appointment with HBV- IE33 MACHINE (WT ) at [facility] NON-INVASIVE CARD ([phone]) Age Limit for ALL Heart procedures @ all [facility] facilities: 18 yrs and older only. Under the age of 25, refer to [facility] ([phone]). Wt Limit: 350lbs. This study requires patient to bring a written physician's order or MD office may fax the order to Central Scheduling at [phone]. Patient needs to bring a current list of all medications. No preparation is required for this study. Patients should report 15 minutes prior to their appointment time to the [facility], [address] [place], [place] [address]vd/[address]. Referral Information Referral ID Referred By Referred To  
  
 [id number] Sarah Mckenna Not Available Visits Status Start Date End Date 1 New Request 2/1/17 2/1/18 If your referral has a status of pending review or denied, additional information will be sent to support the outcome of this decision. Referral ID Referred By Referred To  
 [id number] Sarah Mckenna Not Available Visits Status Start Date End Date 1 New Request 2/1/17 2/1/18 If your referral has a status of pending review or denied, additional information will be sent to support the outcome of this decision. Introducing [facility]!    
 Henrique Bush introduces [employer] patient portal. Now you can access parts of your medical record, email your doctor's office, and request medication refills online. 1. In your internet browser, go to [email or web address] ([email or web address])/[email or web address] DFM 2. Click on the First Time User? Click Here link in the Sign In box. You will see the New Member Sign Up page. 3. Enter your [employer] Access Code exactly as it appears below. You will not need to use this code after youve completed the sign-up process. If you do not sign up before the expiration date, you must request a new code. · [employer] Access Code: [email or web address] Expires: 4/11/2017 12:44 PM 
 
4. Enter the last four digits of your Social Security Number (xxxx) and Date of Birth (mm/dd/yyyy) as indicated and click Submit. You will be taken to the next sign-up page. 5. Create a FoxyTunes ID. This will be your [employer] login ID and cannot be changed, so think of one that is secure and easy to remember. 6. Create a [employer] password. You can change your password at any time. 7. Enter your Password Reset Question and Answer. This can be used at a later time if you forget your password. 8. Enter your e-mail address. You will receive e-mail notification when new information is available in [address] 9. Click Sign Up. You can now view and download portions of your medical record. 10. Click the Download Summary menu link to download a portable copy of your medical information. If you have questions, please visit the Frequently Asked Questions section of the [employer] website. Remember, [employer] is NOT to be used for urgent needs. For medical emergencies, dial 911. Now available from your iPhone and Android! Please provide this summary of care documentation to your next provider. Your primary care clinician is listed as Jose E Jordana. If you have any questions after today's visit, please call [phone].

## 2017-02-03 ENCOUNTER — CLINICAL SUPPORT (OUTPATIENT)
Dept: CARDIOLOGY CLINIC | Age: 57
End: 2017-02-03

## 2017-02-03 VITALS
WEIGHT: 187 LBS | BODY MASS INDEX: 26.83 KG/M2 | SYSTOLIC BLOOD PRESSURE: 98 MMHG | DIASTOLIC BLOOD PRESSURE: 70 MMHG | HEART RATE: 121 BPM

## 2017-02-03 DIAGNOSIS — I48.91 ATRIAL FIBRILLATION, UNSPECIFIED TYPE (HCC): Primary | ICD-10-CM

## 2017-02-03 RX ORDER — GUAIFENESIN 100 MG/5ML
81 LIQUID (ML) ORAL DAILY
COMMUNITY
End: 2017-04-29

## 2017-02-03 NOTE — PROGRESS NOTES
Werner Gonzales is a 60 y.o. male that is here for a blood pressure check. His current medications are listed below. Current Outpatient Prescriptions   Medication Sig    aspirin 81 mg chewable tablet Take 81 mg by mouth daily.  metoprolol tartrate (LOPRESSOR) 50 mg tablet Take 1 Tab by mouth two (2) times a day.  DULoxetine (CYMBALTA) 60 mg capsule Take 1 Cap by mouth daily.  amLODIPine (NORVASC) 10 mg tablet Take 1 Tab by mouth daily.  cyanocobalamin (VITAMIN B-12) 1,000 mcg tablet Take 1 Tab by mouth daily.  hydrochlorothiazide (HYDRODIURIL) 25 mg tablet Take 1 Tab by mouth daily.  losartan (COZAAR) 100 mg tablet Take 1 Tab by mouth daily.  POTASSIUM PHOS,M-BASIC-D-BASIC (POTASSIUM PHOSPHATE PO) Take  by mouth.  avanafil (STENDRA) 100 mg tablet Take 1 Tab by mouth as needed for Other. Take 1 tab 15-30 mins prior to sexual acitivity     No current facility-administered medications for this visit. His   Visit Vitals    BP 98/70 (BP 1 Location: Left arm, BP Patient Position: Sitting)    Pulse (!) 121    Wt 84.8 kg (187 lb)    SpO2 (P) 98%    BMI 26.83 kg/m2         Patient seen for BP and HR check after starting metoprolol 50 mg twice a day on 2/1/2017. Patient denies cardiac complaints. I discussed with Dr. Satish Hernandez. Verbal order and read back per Dr. Satish Hernandez to continue current medication regimen, have Echo and pharm stess test as scheduled, and follow up after testing. Appointment scheduled for follow up.  Patient informed of instructions, read back and verbalized understanding Manisha Hawkins LPN

## 2017-02-03 NOTE — MR AVS SNAPSHOT
Visit Information Date & Time Provider Department Dept. Phone Encounter #  
 2/3/2017  8:00  Jeanine Osorio [facility] HOSP [phone] [id number] Your Appointments 2/17/2017  8:35 AM  
Follow Up with Jorge Donohue MD  
[facility] - [facility] ([address] [place] [address]) Appt Note: 4 week f/u for lower back  75.00cp; 01/16/17 CX APPT DR. ARRINGTON NOT HAVING CT OTHER TEST BY VEIN/VASCULAR TILL 01/2417 FU IS 02/13/17.; BACK FU FROM VEIN AND VAS PHYSICIAN  
 2012 SPECIALTY [facility] Jared Lee [address] [address]  
  
    
 2/24/2017  2:20 PM  
Follow Up with Duane Gorman MD  
[facility] ([address] [place] [address]) Appt Note: follow up after testing Priyanka Lee [id number]  
[phone] Abram Carr  
  
    
 2/28/2017  9:15 AM  
ROUTINE CARE with Eli Connelly MD  
[facility] ([address] [place] [address]) Appt Note: 4 month F/U; =; =  
 [address]  
[facility] [address] Upcoming Health Maintenance Date Due COLONOSCOPY 7/23/2020 DTaP/Tdap/Td series (2 - Td) 7/5/2026 Allergies as of 2/3/2017  Review Complete On: 2/1/2017 By: Duane Gorman MD  
 No Known Allergies Current Immunizations  Reviewed on 7/5/2016 Name Date Tdap 7/5/2016 Not reviewed this visit Vitals BP Pulse Weight(growth percentile) SpO2 BMI Smoking Status 98/70 (BP 1 Location: Left arm, BP Patient Position: Sitting) (!) 121 187 lb (84.8 kg) (P) 98% 26.83 kg/m2 Former Smoker Vitals History BMI and BSA Data Body Mass Index Body Surface Area  
 26.83 kg/m 2 2.05 m 2 Preferred Pharmacy Pharmacy Name Phone [address] [facility], [address] Your Updated Medication List  
  
   
This list is accurate as of: 2/3/17  8:59 AM.  Always use your most recent med list. amLODIPine 10 mg tablet Commonly known as:  Aly Jim Take 1 Tab by mouth daily. aspirin 81 mg chewable tablet Take 81 mg by mouth daily. avanafil 100 mg tablet Commonly known as:  HOEOIWX Take 1 Tab by mouth as needed for Other. Take 1 tab 15-30 mins prior to sexual acitivity  
  
 cyanocobalamin 1,000 mcg tablet Commonly known as:  VITAMIN B-12 Take 1 Tab by mouth daily. DULoxetine 60 mg capsule Commonly known as:  CYMBALTA Take 1 Cap by mouth daily. hydroCHLOROthiazide 25 mg tablet Commonly known as:  HYDRODIURIL Take 1 Tab by mouth daily. losartan 100 mg tablet Commonly known as:  COZAAR Take 1 Tab by mouth daily. metoprolol tartrate 50 mg tablet Commonly known as:  LOPRESSOR Take 1 Tab by mouth two (2) times a day. POTASSIUM PHOSPHATE PO Take  by mouth. To-Do List   
 02/09/2017 7:15 AM  
  Appointment with HBV NUC CARD ROOM at [facility] NON-INVASIVE CARD ([phone]) This is a 2-part test which takes approximately 4 hours to complete. Please see part 2 of exam below for full instructions 02/09/2017 7:45 AM  
  Appointment with HBV NUC CARD ROOM at [facility] NON-INVASIVE CARD ([phone]) 1-No eating or coffee after midnight  2-Do not take diabetic meds (bring with) 3-Please take all other meds unless specified by cardiology 02/09/2017 9:00 AM  
  Appointment with HBV- IE33 MACHINE (WT ) at [facility] NON-INVASIVE CARD ([phone]) Age Limit for ALL Heart procedures @ [facility] facilities: 18 yrs and older only. Under the age of 25, refer to [facility] ([phone]). Wt Limit: 350lbs.   This study requires patient to bring a written physician's order or MD office may fax the order to Central Scheduling at [phone]. Patient needs to bring a current list of all medications. No preparation is required for this study. Patients should report 15 minutes prior to their appointment time to the [facility], [address] [place] [address]/[address]. Patient Instructions Continue current medication regimen Introducing [facility]! Roslyn Brady introduces [employer] patient portal. Now you can access parts of your medical record, email your doctor's office, and request medication refills online. 1. In your internet browser, go to [email or web address] 2. Click on the First Time User? Click Here link in the Sign In box. You will see the New Member Sign Up page. 3. Enter your [employer] Access Code exactly as it appears below. You will not need to use this code after youve completed the sign-up process. If you do not sign up before the expiration date, you must request a new code. · [employer] Access Code: [email or web address] Expires: 4/11/2017 12:44 PM 
 
4. Enter the last four digits of your Social Security Number (xxxx) and Date of Birth (mm/dd/yyyy) as indicated and click Submit. You will be taken to the next sign-up page. 5. Create a [employer] ID. This will be your [employer] login ID and cannot be changed, so think of one that is secure and easy to remember. 6. Create a [employer] password. You can change your password at any time. 7. Enter your Password Reset Question and Answer. This can be used at a later time if you forget your password. 8. Enter your e-mail address. You will receive e-mail notification when new information is available in [address] 9. Click Sign Up. You can now view and download portions of your medical record. 10. Click the Download Summary menu link to download a portable copy of your medical information.  
 
If you have questions, please visit the Frequently Asked Questions section of the TV Pixie. Remember, [employer] is NOT to be used for urgent needs. For medical emergencies, dial 911. Now available from your iPhone and Android! Please provide this summary of care documentation to your next provider. Your primary care clinician is listed as Artem Presley. If you have any questions after today's visit, please call [phone].

## 2017-02-13 ENCOUNTER — HOSPITAL ENCOUNTER (OUTPATIENT)
Dept: NON INVASIVE DIAGNOSTICS | Age: 57
Discharge: HOME | End: 2017-02-13
Payer: COMMERCIAL

## 2017-02-13 DIAGNOSIS — I48.91 ATRIAL FIBRILLATION WITH RAPID VENTRICULAR RESPONSE (HCC): Primary | ICD-10-CM

## 2017-02-13 DIAGNOSIS — I35.0 AORTIC VALVE STENOSIS, UNSPECIFIED ETIOLOGY: ICD-10-CM

## 2017-02-13 DIAGNOSIS — R94.31 ABNORMAL EKG: ICD-10-CM

## 2017-02-13 DIAGNOSIS — I70.213 ATHEROSCLEROSIS OF NATIVE ARTERY OF BOTH LOWER EXTREMITIES WITH INTERMITTENT CLAUDICATION (HCC): ICD-10-CM

## 2017-02-13 DIAGNOSIS — I48.91 ATRIAL FIBRILLATION, UNSPECIFIED TYPE (HCC): ICD-10-CM

## 2017-02-13 NOTE — TELEPHONE ENCOUNTER
Patient came in for stress test today and his heart rate was 153 with a rhythm of afib with RVR. Discussed with Dr. Joseph Boyle. Start Digoxin 0.25 mg one tablet BID then one tablet daily thereafter. Come in for BP/HR check on Friday. Have dig level drawn on Friday.  Verbal order and read back per Sushma Cosby DO

## 2017-02-15 RX ORDER — DULOXETIN HYDROCHLORIDE 60 MG/1
60 CAPSULE, DELAYED RELEASE ORAL DAILY
Qty: 30 CAP | Refills: 0 | Status: SHIPPED | OUTPATIENT
Start: 2017-02-15 | End: 2017-04-29

## 2017-02-15 RX ORDER — DIGOXIN 250 MCG
TABLET ORAL
Qty: 32 TAB | Refills: 6 | Status: SHIPPED | OUTPATIENT
Start: 2017-02-15 | End: 2017-02-26 | Stop reason: ALTCHOICE

## 2017-02-15 NOTE — TELEPHONE ENCOUNTER
Last Visit: 12/13/2016 with MD Pamela Quintero    Next Appointment: 02/27/2017 with MD Pamela Quintero; Office Reschedule 02/17   Previous Refill Encounters: 12/13/2016 per MD Pamela Quintero #30 with 1 refill     Requested Prescriptions     Pending Prescriptions Disp Refills    DULoxetine (CYMBALTA) 60 mg capsule 30 Cap 0     Sig: Take 1 Cap by mouth daily.

## 2017-02-24 ENCOUNTER — HOSPITAL ENCOUNTER (OUTPATIENT)
Dept: NON INVASIVE DIAGNOSTICS | Age: 57
Discharge: HOME | End: 2017-02-24
Payer: COMMERCIAL

## 2017-02-24 ENCOUNTER — OFFICE VISIT (OUTPATIENT)
Dept: CARDIOLOGY CLINIC | Age: 57
End: 2017-02-24

## 2017-02-24 VITALS
RESPIRATION RATE: 18 BRPM | WEIGHT: 179 LBS | HEIGHT: 70 IN | SYSTOLIC BLOOD PRESSURE: 110 MMHG | BODY MASS INDEX: 25.62 KG/M2 | DIASTOLIC BLOOD PRESSURE: 64 MMHG

## 2017-02-24 DIAGNOSIS — I35.0 AORTIC VALVE STENOSIS, UNSPECIFIED ETIOLOGY: Primary | ICD-10-CM

## 2017-02-24 DIAGNOSIS — I74.09 AORTOILIAC OCCLUSIVE DISEASE (HCC): ICD-10-CM

## 2017-02-24 DIAGNOSIS — I70.213 ATHEROSCLEROSIS OF NATIVE ARTERY OF BOTH LOWER EXTREMITIES WITH INTERMITTENT CLAUDICATION (HCC): ICD-10-CM

## 2017-02-24 DIAGNOSIS — I73.9 PAD (PERIPHERAL ARTERY DISEASE) (HCC): ICD-10-CM

## 2017-02-24 DIAGNOSIS — I35.0 AORTIC VALVE STENOSIS, UNSPECIFIED ETIOLOGY: ICD-10-CM

## 2017-02-24 PROCEDURE — 93306 TTE W/DOPPLER COMPLETE: CPT

## 2017-02-24 NOTE — PROGRESS NOTES
Stat Echocardiogram completed. Dr Bernadette Peterson notified. Report to follow. Patient left with armband on. Patient aware of PHI.

## 2017-02-25 NOTE — PROGRESS NOTES
Per your last note \" Atrial fibrillation with a rapid ventricular response  Possible prior anteroseptal myocardial infarction  Peripheral vascular disease with claudication limiting activity

## 2017-02-26 RX ORDER — METOPROLOL TARTRATE 50 MG/1
50 TABLET ORAL 3 TIMES DAILY
Qty: 60 TAB | Refills: 3
Start: 2017-02-26 | End: 2017-04-21

## 2017-02-26 NOTE — PROGRESS NOTES
PATIENT NAME: Sunny Mi         60 y.o.      1960              DATE:2/24/2017    REASON FOR VISIT: Atrial fibrillation. HISTORY OF PRESENT ILLNESS: The patient was anticipating aortobifemoral bypass surgery. He was found to be in atrial fibrillation with a rapid ventricular response. Was denying chest discomfort and shortness of breath. Echocardiography was ordered. Pharmacologic stress testing with Cardiolite also ordered. The patient was begun on metoprolol 50 mg p.o. twice daily. The stress test was not performed because of a rapid ventricular response to the atrial fibrillation. For some reason, the echocardiogram was not performed either. The patient denies chest discomfort and shortness of breath. Denies palpitation, syncope, presyncope. His main complaint is pain in both lower extremities. He has rest pain in the right lower extremity. PAST MEDICAL HISTORY:   Past Medical History:  No date: A-fib [facility])  No date: Abnormal EKG  No date: Essential hypertension  05/04/2015: Lumbar canal stenosis      Comment: Dr. Kari Grider  No date: PVD (peripheral vascular disease) ([facility] [address]    PAST SURGICAL HISTORY:   Past Surgical History:  07/23/2015: HX COLONOSCOPY      Comment: polyps repeat 2020 5 years [facility]      SOCIAL HISTORY:  Social History    Marital status: LEGALLY    Spouse name:                       Years of education:                 Number of children:               Social History Main Topics    Smoking status: Former Smoker                                                                Packs/day: 0.00      Years: 0.00        Smokeless status: Never Used                        Comment: quit in 2011    Alcohol use: Yes           1.2 oz/week       2 Cans of beer per week       Comment: 10 cans of beer a month    Drug use:  Yes                Special: Marijuana       Comment: occasionally    Sexual activity: Yes               Partners with: Female        ALLERGIES:   No Known Allergies     CURRENT MEDICATIONS:   Current Outpatient Prescriptions:  digoxin (LANOXIN) 0.25 mg tablet, Take one tablet twice daily for two days, then take one tablet daily  DULoxetine (CYMBALTA) 60 mg capsule, Take 1 Cap by mouth daily. aspirin 81 mg chewable tablet, Take 81 mg by mouth daily. metoprolol tartrate (LOPRESSOR) 50 mg tablet, Take 1 Tab by mouth two (2) times a day. amLODIPine (NORVASC) 10 mg tablet, Take 1 Tab by mouth daily. cyanocobalamin (VITAMIN B-12) 1,000 mcg tablet, Take 1 Tab by mouth daily. hydrochlorothiazide (HYDRODIURIL) 25 mg tablet, Take 1 Tab by mouth daily. losartan (COZAAR) 100 mg tablet, Take 1 Tab by mouth daily. avanafil (STENDRA) 100 mg tablet, Take 1 Tab by mouth as needed for Other. Take 1 tab 15-30 mins prior to sexual acitivity  POTASSIUM PHOS,M-BASIC-D-BASIC (POTASSIUM PHOSPHATE PO), Take  by mouth. No current facility-administered medications for this visit. REVIEW of SYSTEMS:History obtained from chart review and the patient  General ROS: 8 pound weight loss since last visit  Hematological and Lymphatic ROS: negative for - bleeding problems  Respiratory ROS: no cough, shortness of breath, or wheezing  Cardiovascular ROS: See history of present illness. Neurological ROS: no TIA or stroke symptoms     PHYSICAL EXAMINATION:   /64  Resp 18  Ht 5' 10\" (1.778 m)  Wt 81.2 kg (179 lb)  BMI 25.68 kg/m2  BP Readings from Last 3 Encounters:  02/24/17 : 110/64  02/03/17 : 98/70  02/01/17 : 144/90    Pulse Readings from Last 3 Encounters:  02/03/17 : (!) 121  02/01/17 : (!) 158  01/25/17 : 100    Wt Readings from Last 3 Encounters:  02/24/17 : 81.2 kg (179 lb)  02/03/17 : 84.8 kg (187 lb)  01/27/17 : 86.6 kg (191 lb)    General: Well-developed white male no apparent distress. Neck: No jugular venous distention. Chest: Clear to auscultation. Heart: Irregular rhythm. No murmur or gallop audible. Extremities: 1+ edema.   Unable to palpate dorsalis pedis or posterior tibial pulses. Skin: Erythema both lower extremities. EKG: Atrial fibrillation. Nonspecific ST-T changes. Cannot rule out anteroseptal myocardial infarction age-indeterminate. IMPRESSION:   Atrial fibrillation, rapid ventricular response  Abnormal EKG consistent with a possible prior anteroseptal myocardial infarction  Peripheral vascular disease with claudication    PLAN:  The patient's discomfort in the lower extremities is getting worse. I believe he is having rest pain. He needs an operation, but his cardiac evaluation has been delayed. I need to look at an echocardiogram as soon as possible. Increase metoprolol to 50 mg p.o. 3 times daily. The diagnoses and plan were discussed with patient and caregiver. All questions answered. Plan of care agreed to by all concerned. Jen Holden. MD Zack     ADDENDUM dated 2/26/2017. The echocardiogram showed a depressed ejection fraction at 35-40%. He probably should have coronary arteriography prior to surgery. I have asked my office to contact him to have labs drawn. If his renal function is satisfactory, consider coronary arteriography.   ,

## 2017-02-27 ENCOUNTER — OFFICE VISIT (OUTPATIENT)
Dept: VASCULAR SURGERY | Age: 57
End: 2017-02-27

## 2017-02-27 VITALS
HEART RATE: 110 BPM | SYSTOLIC BLOOD PRESSURE: 130 MMHG | HEIGHT: 70 IN | WEIGHT: 179 LBS | DIASTOLIC BLOOD PRESSURE: 60 MMHG | BODY MASS INDEX: 25.62 KG/M2 | RESPIRATION RATE: 18 BRPM

## 2017-02-27 DIAGNOSIS — I73.9 PAD (PERIPHERAL ARTERY DISEASE) (HCC): ICD-10-CM

## 2017-02-27 DIAGNOSIS — I70.213 ATHEROSCLEROSIS OF NATIVE ARTERY OF BOTH LOWER EXTREMITIES WITH INTERMITTENT CLAUDICATION (HCC): ICD-10-CM

## 2017-02-27 DIAGNOSIS — I74.09 AORTOILIAC OCCLUSIVE DISEASE (HCC): ICD-10-CM

## 2017-02-27 RX ORDER — DULOXETINE HYDROCHLORIDE 30 MG/1
CAPSULE, DELAYED RELEASE ORAL
Refills: 0 | Status: ON HOLD | COMMUNITY
Start: 2017-02-15 | End: 2017-04-14

## 2017-02-27 RX ORDER — OXYCODONE AND ACETAMINOPHEN 5; 325 MG/1; MG/1
1 TABLET ORAL
Qty: 80 TAB | Refills: 0 | Status: SHIPPED | OUTPATIENT
Start: 2017-02-27 | End: 2017-04-21

## 2017-02-27 NOTE — MR AVS SNAPSHOT
Visit Information Date & Time Provider Department Dept. Phone Encounter #  
 2/27/2017 10:00 AM Daylin Barnes, [address] [facility] [phone] [id number] Follow-up Instructions Return in about 4 weeks (around 3/27/2017). Your Appointments 3/17/2017 11:00 AM  
ROUTINE CARE with Ryan Donald MD  
[facility] (Rohit Dorsey) Appt Note: 4 month F/U; =; =; .  
 [address] [facility] [address] [facility] [address] [place] [address]  
  
    
 3/27/2017 12:45 PM  
PROCEDURE with [facility] IMAGING 2  
[facility] (KANG SCHEDULING) Appt Note: cv 1mo michaels - may need auth [address] [address]  
[phone] [address] [phone] Upcoming Health Maintenance Date Due COLONOSCOPY 7/23/2020 DTaP/Tdap/Td series (2 - Td) 7/5/2026 Allergies as of 2/27/2017  Review Complete On: 2/27/2017 By: Daylin Barnes MD  
 No Known Allergies Current Immunizations  Reviewed on 7/5/2016 Name Date Tdap 7/5/2016 Not reviewed this visit You Were Diagnosed With   
  
 Codes Comments Aortoiliac occlusive disease (HCC)     ICD-10-CM: I74.09 
ICD-9-CM: 444.09 Atherosclerosis of native artery of both lower extremities with intermittent claudication ([facility] [address]     ICD-10-CM: T90.287 ICD-9-CM: 440.21 PAD (peripheral artery disease) (HCC)     ICD-10-CM: I73.9 ICD-9-CM: 443. 9 Vitals BP  
  
  
  
  
  
 130/60 (BP 1 Location: Left arm, BP Patient Position: Sitting) Vitals History BMI and BSA Data Body Mass Index Body Surface Area  
 25.68 kg/m 2 2 m 2 Preferred Pharmacy Pharmacy Name Phone [address], [address] Your Updated Medication List  
  
   
 This list is accurate as of: 2/27/17 10:18 AM.  Always use your most recent med list. amLODIPine 10 mg tablet Commonly known as:  Rudene Post Take 1 Tab by mouth daily. aspirin 81 mg chewable tablet Take 81 mg by mouth daily. avanafil 100 mg tablet Commonly known as:  TJYKSMR Take 1 Tab by mouth as needed for Other. Take 1 tab 15-30 mins prior to sexual acitivity  
  
 cyanocobalamin 1,000 mcg tablet Commonly known as:  VITAMIN B-12 Take 1 Tab by mouth daily. DIGITEK 0.25 mg tablet Generic drug:  digoxin  
take 1 tablet by mouth twice a day then 1 tablet by mouth once daily DULoxetine 60 mg capsule Commonly known as:  CYMBALTA Take 1 Cap by mouth daily. hydroCHLOROthiazide 25 mg tablet Commonly known as:  HYDRODIURIL Take 1 Tab by mouth daily. losartan 100 mg tablet Commonly known as:  COZAAR Take 1 Tab by mouth daily. metoprolol tartrate 50 mg tablet Commonly known as:  LOPRESSOR Take 1 Tab by mouth three (3) times daily. POTASSIUM PHOSPHATE PO Take  by mouth. Follow-up Instructions Return in about 4 weeks (around 3/27/2017). To-Do List   
 03/27/2017 Imaging:  DUPLEX CAROTID BILATERAL AMB Introducing [facility]! [facility] introduces [employer] patient portal. Now you can access parts of your medical record, email your doctor's office, and request medication refills online. 1. In your internet browser, go to [email or web address] 2. Click on the First Time User? Click Here link in the Sign In box. You will see the New Member Sign Up page. 3. Enter your [employer] Access Code exactly as it appears below. You will not need to use this code after youve completed the sign-up process. If you do not sign up before the expiration date, you must request a new code. · [employer] Access Code: [email or web address] Expires: 4/11/2017 12:44 PM 
 
 4. Enter the last four digits of your Social Security Number (xxxx) and Date of Birth (mm/dd/yyyy) as indicated and click Submit. You will be taken to the next sign-up page. 5. Create a [employer] ID. This will be your [employer] login ID and cannot be changed, so think of one that is secure and easy to remember. 6. Create a [employer] password. You can change your password at any time. 7. Enter your Password Reset Question and Answer. This can be used at a later time if you forget your password. 8. Enter your e-mail address. You will receive e-mail notification when new information is available in [address] 9. Click Sign Up. You can now view and download portions of your medical record. 10. Click the Download Summary menu link to download a portable copy of your medical information. If you have questions, please visit the Frequently Asked Questions section of the [employer] website. Remember, [employer] is NOT to be used for urgent needs. For medical emergencies, dial 911. Now available from your iPhone and Android! Please provide this summary of care documentation to your next provider. Your primary care clinician is listed as Paz Melchor. If you have any questions after today's visit, please call [phone].

## 2017-02-27 NOTE — PROGRESS NOTES
Maral Stephen    Chief Complaint   Patient presents with    Leg Pain       History and Physical    Maral Stephen is a 60 y.o. with aortoiliac occlusive disease. He was recommended for an aortobifemoral bypass and and workup was found to have A. fib with RVR. He could not undergo his stress test.  But he did have an echocardiogram showing an ejection fraction of about 35%. He also had an ECG showing some possible inferior lateral MI. Patient has been recommended by his cardiologist to have coronary angiography with possible revascularization. Currently the patient is not complaining of any angina but he is only able to walk about 50 feet before his legs start to hurt. He has severe claudication on the verge of rest pain although he does describe portions of rest pain during the day. This is of bilateral lower extremities. No ulcerations are noted. No fevers or chills at this current time.     Past Medical History:   Diagnosis Date    A-fib [facility])     Abnormal EKG     Essential hypertension     Lumbar canal stenosis 05/04/2015    Dr. Frances Farias PVD (peripheral vascular disease) [facility])      Past Surgical History:   Procedure Laterality Date    HX COLONOSCOPY  07/23/2015    polyps repeat 2020 5 years [facility]     Patient Active Problem List   Diagnosis Code    Hypertension I10    Vitamin B12 deficiency E53.8    Low back pain M54.5    DDD (degenerative disc disease), lumbar M51.36    Erectile dysfunction N52.9    Lumbar neuritis M54.16    Spinal stenosis, lumbar region, without neurogenic claudication M48.06    Hereditary peripheral neuropathy G60.9    Spinal stenosis, lumbar M48.06    Aortic valve stenosis I35.0    Hereditary and idiopathic neuropathy, unspecified G60.9    Radiculopathy, lumbar region M54.16    Aortoiliac occlusive disease ([address] I74.09    Atherosclerosis of native artery of both lower extremities with intermittent claudication ([address] I70.213    PAD (peripheral artery disease) ([facility] I73.9     Current Outpatient Prescriptions   Medication Sig Dispense Refill    DIGITEK 250 mcg tablet take 1 tablet by mouth twice a day then 1 tablet by mouth once daily  0    metoprolol tartrate (LOPRESSOR) 50 mg tablet Take 1 Tab by mouth three (3) times daily. 60 Tab 3    DULoxetine (CYMBALTA) 60 mg capsule Take 1 Cap by mouth daily. 30 Cap 0    aspirin 81 mg chewable tablet Take 81 mg by mouth daily.  amLODIPine (NORVASC) 10 mg tablet Take 1 Tab by mouth daily. 90 Tab 3    cyanocobalamin (VITAMIN B-12) 1,000 mcg tablet Take 1 Tab by mouth daily. 90 Tab 3    hydrochlorothiazide (HYDRODIURIL) 25 mg tablet Take 1 Tab by mouth daily. 90 Tab 3    losartan (COZAAR) 100 mg tablet Take 1 Tab by mouth daily. 90 Tab 3    avanafil (STENDRA) 100 mg tablet Take 1 Tab by mouth as needed for Other. Take 1 tab 15-30 mins prior to sexual acitivity 12 Tab 0    POTASSIUM PHOS,M-BASIC-D-BASIC (POTASSIUM PHOSPHATE PO) Take  by mouth. No Known Allergies  Social History     Social History    Marital status: LEGALLY      Spouse name: N/A    Number of children: N/A    Years of education: N/A     Occupational History    Not on file.      Social History Main Topics    Smoking status: Former Smoker    Smokeless tobacco: Never Used      Comment: quit in 2011    Alcohol use 1.2 oz/week     2 Cans of beer per week      Comment: 10 cans of beer a month    Drug use: Yes     Special: Marijuana      Comment: occasionally    Sexual activity: Yes     Partners: Female     Other Topics Concern    Not on file     Social History Narrative      Family History   Problem Relation Age of Onset    Heart Disease Father        Physical Exam:    Visit Vitals    /60 (BP 1 Location: Left arm, BP Patient Position: Sitting)    Pulse (!) 110    Resp 18    Ht 5' 10\" (1.778 m)    Wt 179 lb (81.2 kg)    BMI 25.68 kg/m2      General: Well-appearing male in no acute distress  HEENT: EOMI no scleral icterus is noted  Pulmonary: No increased work of breathing is noted  Extremities: Warm and perfused bilaterally nonpalpable distal pulses diet bilaterally  Neuro: Cranial nerves II through XII grossly intact  Integument: No visible ulcerations are identified    Impression and Plan:  Elliott Barton is a 60 y.o. male with aortoiliac occlusive disease in need of revascularization of his lower extremities. However it seems that he would also need coronary revascularization as well. I had a long discussion with him and his family member in the room today and described the fact that I can revascularize his lower extremities all day long but if he has major heart attack that really means nothing in the end. I have recommended going through coronary angiography and if he does need stenting to move ahead and get any revascularization as required. We can always perform an axillary bifemoral bypass even on Plavix. If the bypass was eventually to go down and he is already had his coronary revascularization that we can then come back and do an aortobifemoral bypass at that later date and time. At this current time I will have him come back in 4 weeks time with carotid ultrasounds to determine if there is any steal syndrome on either upper extremity and confirm that there is no arterial disease up in his upper extremities and carotids. Pending this ultrasound will depend on which side his axillary bypass will end up coming off of. This also give him some time to get his coronary angiogram and have further imaging and discussion with his cardiologist as to how best to revascularize his coronary system. It very mellow may be that there is no way to revascularize his coronaries and then his only option would be the axillary bifemoral bypass.   Unfortunately given his TASC-D lesions he is not a candidate for stenting he would have a low probability of success of stenting of his iliac and aorta and an even lower probability that this would work long-term. Given that we will see him back in 1 month's time hopefully we will have some more information about his coronaries at that time as well. We reviewed the plan with the patient and the patient understands. We also gave the patient appropriate instructions on their disease process and when to call back. Follow-up Disposition:  Return in about 4 weeks (around 3/27/2017). Simón Shearer MD    PLEASE NOTE:  This document has been produced using voice recognition software. Unrecognized errors in transcription may be present.

## 2017-03-01 ENCOUNTER — TELEPHONE (OUTPATIENT)
Dept: CARDIOLOGY CLINIC | Age: 57
End: 2017-03-01

## 2017-03-01 NOTE — TELEPHONE ENCOUNTER
Patient is scheduled for 3/8 @ 10:30am with Dr. Marybeth Lion. Message -----   Leonel Navarrete From: Lisa Meza RN      Sent: 2/27/2017   1:26 PM        To: Martha Galindo     Spoke with [facility] office , they are holding off procedure for 4 weeks. Aware of heart cath .       ----- Message -----      From: Jn Lagos MD      Sent: 2/26/2017   3:43 PM        To: Lisa Meza RN     His echo showed an ejection fraction of 35-40%.  He probably should have coronary arteriography prior to vascular surgery although I am not sure whether the vascular surgeon would like to have some sort of arteriography done as well.  In any event, this patient needs to get a comprehensive metabolic profile and a CBC done as soon as possible and I need to talk to the vascular surgeon.

## 2017-03-03 ENCOUNTER — OFFICE VISIT (OUTPATIENT)
Dept: CARDIOLOGY CLINIC | Age: 57
End: 2017-03-03

## 2017-03-03 ENCOUNTER — HOSPITAL ENCOUNTER (OUTPATIENT)
Dept: LAB | Age: 57
Discharge: HOME | End: 2017-03-03
Payer: COMMERCIAL

## 2017-03-03 ENCOUNTER — HOSPITAL ENCOUNTER (OUTPATIENT)
Dept: GENERAL RADIOLOGY | Age: 57
Discharge: HOME | End: 2017-03-03
Payer: COMMERCIAL

## 2017-03-03 DIAGNOSIS — I70.213 ATHEROSCLEROSIS OF NATIVE ARTERY OF BOTH LOWER EXTREMITIES WITH INTERMITTENT CLAUDICATION (HCC): Primary | ICD-10-CM

## 2017-03-03 DIAGNOSIS — I70.213 ATHEROSCLEROSIS OF NATIVE ARTERY OF BOTH LOWER EXTREMITIES WITH INTERMITTENT CLAUDICATION (HCC): ICD-10-CM

## 2017-03-03 PROCEDURE — 99001 SPECIMEN HANDLING PT-LAB: CPT

## 2017-03-03 PROCEDURE — 71020 XR CHEST PA LAT: CPT

## 2017-03-03 RX ORDER — SODIUM CHLORIDE 0.9 % (FLUSH) 0.9 %
5-10 SYRINGE (ML) INJECTION EVERY 8 HOURS
Status: CANCELLED | OUTPATIENT
Start: 2017-03-03

## 2017-03-03 RX ORDER — SODIUM CHLORIDE 0.9 % (FLUSH) 0.9 %
5-10 SYRINGE (ML) INJECTION AS NEEDED
Status: CANCELLED | OUTPATIENT
Start: 2017-03-03

## 2017-03-03 NOTE — MR AVS SNAPSHOT
Visit Information Date & Time Provider Department Dept. Phone Encounter #  
 3/3/2017  8:30  [facility] [address] [id number] Your Appointments 3/3/2017  8:30 AM  
PRE PROCEDURE with [facility] ([address]) Appt Note: Cath 3/8  
 [facility] [address] [address] [address] [phone] [address] [facility] [address] 3/17/2017 11:00 AM  
ROUTINE CARE with Samy العراقي MD  
[facility] ([address] [facility]) Appt Note: 4 month F/U; =; =; .  
 [address] [facility] [address] [facility] [address] [place] [address]  
  
    
 3/27/2017 12:45 PM  
PROCEDURE with [facility]  
[facility] ([facility]) Appt Note: cv 1mo michaels - may need auth [address] [address]  
[phone] [address] [facility] [address]  
  
    
 4/6/2017  9:00 AM  
Follow Up with JUNE Washington  
[facility] ([facility]) Appt Note: follow up after studies on [address] [address]  
[phone]  
  
   
 [address] [address] Upcoming Health Maintenance Date Due COLONOSCOPY 7/23/2020 DTaP/Tdap/Td series (2 - Td) 7/5/2026 Allergies as of 3/3/2017  Review Complete On: 2/27/2017 By: Simón Shearer MD  
 No Known Allergies Current Immunizations  Reviewed on 7/5/2016 Name Date Tdap 7/5/2016 Not reviewed this visit You Were Diagnosed With   
  
 Codes Comments Atherosclerosis of native artery of both lower extremities with intermittent claudication ([facility] [address]    -  Primary ICD-10-CM: O71.831 ICD-9-CM: 440.21 Vitals Smoking Status Former Smoker Preferred Pharmacy Pharmacy Name Phone [facility], [address] Your Updated Medication List  
  
   
This list is accurate as of: 3/3/17  8:04 AM.  Always use your most recent med list. amLODIPine 10 mg tablet Commonly known as:  Ernestine Nicholser Take 1 Tab by mouth daily. aspirin 81 mg chewable tablet Take 81 mg by mouth daily. avanafil 100 mg tablet Commonly known as:  XYSOWFK Take 1 Tab by mouth as needed for Other. Take 1 tab 15-30 mins prior to sexual acitivity  
  
 cyanocobalamin 1,000 mcg tablet Commonly known as:  VITAMIN B-12 Take 1 Tab by mouth daily. DIGITEK 0.25 mg tablet Generic drug:  digoxin  
take 1 tablet by mouth twice a day then 1 tablet by mouth once daily DULoxetine 60 mg capsule Commonly known as:  CYMBALTA Take 1 Cap by mouth daily. hydroCHLOROthiazide 25 mg tablet Commonly known as:  HYDRODIURIL Take 1 Tab by mouth daily. losartan 100 mg tablet Commonly known as:  COZAAR Take 1 Tab by mouth daily. metoprolol tartrate 50 mg tablet Commonly known as:  LOPRESSOR Take 1 Tab by mouth three (3) times daily. oxyCODONE-acetaminophen 5-325 mg per tablet Commonly known as:  PERCOCET Take 1 Tab by mouth every four (4) hours as needed for Pain. Max Daily Amount: 6 Tabs. POTASSIUM PHOSPHATE PO Take  by mouth. To-Do List   
 03/03/2017 Lab:  CBC WITH AUTOMATED DIFF   
  
 03/03/2017 Lab:  METABOLIC PANEL, COMPREHENSIVE   
  
 03/03/2017 Lab:  PROTHROMBIN TIME + INR   
  
 03/03/2017 Imaging:  XR CHEST PA LAT   
  
 03/08/2017 10:30 AM  
  Appointment with [facility] CAD NO ANESTHESIA; [facility] 1 CATH LAB; [facility] CATH HOLDING at [address] [facility] ([phone]) Patient Instructions [facility] Patient  EP Instructions 1. You are scheduled to have a Left heart Cath on  March 8, 2017 , at 1030 am.    Please check in at 0930 am. 
 
2. Please go to [facility] and park in the outpatient parking lot that is located around to the back of the hospital and enter through the [facility]. Once you enter through the [facility] check in with the  there. The  will either give you directions or assist you in getting to the cath holding area. 3.         You are not to eat or drink anything after midnight the morning of the  
            procedure. 4. Please continue to take your medications with a small sip of water on the morning of the procedure with the following exceptions: Continue all current medications. 5. If you are diabetic, do not take your insulin/sugar pill the morning of the procedure. 6. We encourage families to wait in the waiting room on the first floor while the procedure is being done. The Doctor will come out and talk with you as soon as the procedure is over. 7. There is the possibility that you may spend the night in the hospital, depending on the results of the procedure. This will be determined after the procedure is done. 8.   If you or your family have any questions, please call our office Monday-Friday 9:00am  
      -4:30 pm , at [phone], and ask to speak to one of the nurses. Introducing [facility]! Javier Pedraza introduces [employer] patient portal. Now you can access parts of your medical record, email your doctor's office, and request medication refills online. 1. In your internet browser, go to [email or web address] 2. Click on the First Time User? Click Here link in the Sign In box. You will see the New Member Sign Up page. 3. Enter your [employer] Access Code exactly as it appears below. You will not need to use this code after youve completed the sign-up process.  If you do not sign up before the expiration date, you must request a new code. · [employer] Access Code: [email or web address] Expires: 4/11/2017 12:44 PM 
 
4. Enter the last four digits of your Social Security Number (xxxx) and Date of Birth (mm/dd/yyyy) as indicated and click Submit. You will be taken to the next sign-up page. 5. Create a [employer] ID. This will be your [employer] login ID and cannot be changed, so think of one that is secure and easy to remember. 6. Create a [employer] password. You can change your password at any time. 7. Enter your Password Reset Question and Answer. This can be used at a later time if you forget your password. 8. Enter your e-mail address. You will receive e-mail notification when new information is available in [address] 9. Click Sign Up. You can now view and download portions of your medical record. 10. Click the Download Summary menu link to download a portable copy of your medical information. If you have questions, please visit the Frequently Asked Questions section of the [employer] website. Remember, [employer] is NOT to be used for urgent needs. For medical emergencies, dial 911. Now available from your iPhone and Android! Please provide this summary of care documentation to your next provider. Your primary care clinician is listed as Sneha Hughes. If you have any questions after today's visit, please call [phone].

## 2017-03-03 NOTE — PATIENT INSTRUCTIONS
[facility] [place]          Patient  EP Instructions                  1. You are scheduled to have a Left heart Cath on  March 8, 2017 , at 1030 am.    Please check in at 0930 am.    2. Please go to [facility] and park in the outpatient parking lot that is located around to the back of the hospital and enter through the [employer]. Once you enter through the [facility] check in with the  there. The  will either give you directions or assist you in getting to the cath holding area. 3.  [x]       You are not to eat or drink anything after midnight the morning of the               procedure. 4. Please continue to take your medications with a small sip of water on the morning of the procedure with the following exceptions: Continue all current medications. 5. If you are diabetic, do not take your insulin/sugar pill the morning of the procedure. 6. We encourage families to wait in the waiting room on the first floor while the procedure is being done. The Doctor will come out and talk with you as soon as the procedure is over. 7. There is the possibility that you may spend the night in the hospital, depending on the results of the procedure. This will be determined after the procedure is done. 8.   If you or your family have any questions, please call our office Monday-Friday 9:00am         -4:30 pm , at [phone], and ask to speak to one of the nurses.

## 2017-03-04 LAB
ALBUMIN SERPL-MCNC: 3.9 G/DL (ref 3.5–5.5)
ALBUMIN/GLOB SERPL: 1.3 {RATIO} (ref 1.1–2.5)
ALP SERPL-CCNC: 329 IU/L (ref 39–117)
ALT SERPL-CCNC: 25 IU/L (ref 0–44)
AST SERPL-CCNC: 30 IU/L (ref 0–40)
BASOPHILS # BLD AUTO: 0 X10E3/UL (ref 0–0.2)
BASOPHILS NFR BLD AUTO: 1 %
BILIRUB SERPL-MCNC: 0.6 MG/DL (ref 0–1.2)
BUN SERPL-MCNC: 12 MG/DL (ref 6–24)
BUN/CREAT SERPL: 12 (ref 9–20)
CALCIUM SERPL-MCNC: 9.1 MG/DL (ref 8.7–10.2)
CHLORIDE SERPL-SCNC: 91 MMOL/L (ref 96–106)
CO2 SERPL-SCNC: 21 MMOL/L (ref 18–29)
CREAT SERPL-MCNC: 0.98 MG/DL (ref 0.76–1.27)
EOSINOPHIL # BLD AUTO: 0.1 X10E3/UL (ref 0–0.4)
EOSINOPHIL NFR BLD AUTO: 2 %
ERYTHROCYTE [DISTWIDTH] IN BLOOD BY AUTOMATED COUNT: 15.1 % (ref 12.3–15.4)
GLOBULIN SER CALC-MCNC: 2.9 G/DL (ref 1.5–4.5)
GLUCOSE SERPL-MCNC: 100 MG/DL (ref 65–99)
HCT VFR BLD AUTO: 36.7 % (ref 37.5–51)
HGB BLD-MCNC: 12.2 G/DL (ref 12.6–17.7)
IMM GRANULOCYTES # BLD: 0 X10E3/UL (ref 0–0.1)
IMM GRANULOCYTES NFR BLD: 0 %
INR PPP: 1 (ref 0.8–1.2)
LYMPHOCYTES # BLD AUTO: 1.6 X10E3/UL (ref 0.7–3.1)
LYMPHOCYTES NFR BLD AUTO: 22 %
MCH RBC QN AUTO: 30 PG (ref 26.6–33)
MCHC RBC AUTO-ENTMCNC: 33.2 G/DL (ref 31.5–35.7)
MCV RBC AUTO: 90 FL (ref 79–97)
MONOCYTES # BLD AUTO: 0.9 X10E3/UL (ref 0.1–0.9)
MONOCYTES NFR BLD AUTO: 12 %
NEUTROPHILS # BLD AUTO: 4.6 X10E3/UL (ref 1.4–7)
NEUTROPHILS NFR BLD AUTO: 63 %
PLATELET # BLD AUTO: 319 X10E3/UL (ref 150–379)
POTASSIUM SERPL-SCNC: 3.9 MMOL/L (ref 3.5–5.2)
PROT SERPL-MCNC: 6.8 G/DL (ref 6–8.5)
PROTHROMBIN TIME: 10.8 SEC (ref 9.1–12)
RBC # BLD AUTO: 4.06 X10E6/UL (ref 4.14–5.8)
SODIUM SERPL-SCNC: 131 MMOL/L (ref 134–144)
WBC # BLD AUTO: 7.3 X10E3/UL (ref 3.4–10.8)

## 2017-03-08 ENCOUNTER — HOSPITAL ENCOUNTER (OUTPATIENT)
Dept: CARDIAC CATH/INVASIVE PROCEDURES | Age: 57
Discharge: HOME | End: 2017-03-08
Attending: INTERNAL MEDICINE | Admitting: INTERNAL MEDICINE
Payer: COMMERCIAL

## 2017-03-08 VITALS
DIASTOLIC BLOOD PRESSURE: 75 MMHG | HEIGHT: 70 IN | HEART RATE: 86 BPM | TEMPERATURE: 97.8 F | SYSTOLIC BLOOD PRESSURE: 122 MMHG | WEIGHT: 189 LBS | OXYGEN SATURATION: 100 % | RESPIRATION RATE: 18 BRPM | BODY MASS INDEX: 27.06 KG/M2

## 2017-03-08 PROCEDURE — 74011000250 HC RX REV CODE- 250: Performed by: INTERNAL MEDICINE

## 2017-03-08 PROCEDURE — 74011250637 HC RX REV CODE- 250/637: Performed by: INTERNAL MEDICINE

## 2017-03-08 PROCEDURE — 74011250636 HC RX REV CODE- 250/636: Performed by: INTERNAL MEDICINE

## 2017-03-08 PROCEDURE — 77030013797 CARDIAC CATHETERIZATION

## 2017-03-08 PROCEDURE — 74011636320 HC RX REV CODE- 636/320: Performed by: INTERNAL MEDICINE

## 2017-03-08 PROCEDURE — 74011636320 HC RX REV CODE- 636/320

## 2017-03-08 RX ORDER — HEPARIN SODIUM 200 [USP'U]/100ML
500 INJECTION, SOLUTION INTRAVENOUS ONCE
Status: COMPLETED | OUTPATIENT
Start: 2017-03-08 | End: 2017-03-08

## 2017-03-08 RX ORDER — LIDOCAINE HYDROCHLORIDE 10 MG/ML
1-60 INJECTION, SOLUTION EPIDURAL; INFILTRATION; INTRACAUDAL; PERINEURAL
Status: DISCONTINUED | OUTPATIENT
Start: 2017-03-08 | End: 2017-03-08 | Stop reason: HOSPADM

## 2017-03-08 RX ORDER — HEPARIN SODIUM 1000 [USP'U]/ML
1000-10000 INJECTION, SOLUTION INTRAVENOUS; SUBCUTANEOUS
Status: DISCONTINUED | OUTPATIENT
Start: 2017-03-08 | End: 2017-03-08 | Stop reason: HOSPADM

## 2017-03-08 RX ORDER — BIVALIRUDIN 250 MG/5ML
0.75 INJECTION, POWDER, LYOPHILIZED, FOR SOLUTION INTRAVENOUS ONCE
Status: DISCONTINUED | OUTPATIENT
Start: 2017-03-08 | End: 2017-03-08 | Stop reason: HOSPADM

## 2017-03-08 RX ORDER — VERAPAMIL HYDROCHLORIDE 2.5 MG/ML
2.5-5 INJECTION, SOLUTION INTRAVENOUS ONCE
Status: COMPLETED | OUTPATIENT
Start: 2017-03-08 | End: 2017-03-08

## 2017-03-08 RX ORDER — SODIUM CHLORIDE 0.9 % (FLUSH) 0.9 %
5-10 SYRINGE (ML) INJECTION EVERY 8 HOURS
Status: DISCONTINUED | OUTPATIENT
Start: 2017-03-08 | End: 2017-03-08 | Stop reason: HOSPADM

## 2017-03-08 RX ORDER — SODIUM CHLORIDE 0.9 % (FLUSH) 0.9 %
5-10 SYRINGE (ML) INJECTION AS NEEDED
Status: DISCONTINUED | OUTPATIENT
Start: 2017-03-08 | End: 2017-03-08 | Stop reason: HOSPADM

## 2017-03-08 RX ORDER — FENTANYL CITRATE 50 UG/ML
12.5-5 INJECTION, SOLUTION INTRAMUSCULAR; INTRAVENOUS
Status: DISCONTINUED | OUTPATIENT
Start: 2017-03-08 | End: 2017-03-08 | Stop reason: HOSPADM

## 2017-03-08 RX ORDER — GUAIFENESIN 100 MG/5ML
81 LIQUID (ML) ORAL ONCE
Status: COMPLETED | OUTPATIENT
Start: 2017-03-08 | End: 2017-03-08

## 2017-03-08 RX ORDER — MIDAZOLAM HYDROCHLORIDE 1 MG/ML
.5-2 INJECTION, SOLUTION INTRAMUSCULAR; INTRAVENOUS
Status: DISCONTINUED | OUTPATIENT
Start: 2017-03-08 | End: 2017-03-08 | Stop reason: HOSPADM

## 2017-03-08 RX ADMIN — FENTANYL CITRATE 25 MCG: 50 INJECTION INTRAMUSCULAR; INTRAVENOUS at 11:19

## 2017-03-08 RX ADMIN — NITROGLYCERIN 200 MCG: 5 INJECTION, SOLUTION INTRAVENOUS at 11:20

## 2017-03-08 RX ADMIN — HEPARIN SODIUM 1000 UNITS: 200 INJECTION, SOLUTION INTRAVENOUS at 11:21

## 2017-03-08 RX ADMIN — VERAPAMIL HYDROCHLORIDE 5 MG: 2.5 INJECTION, SOLUTION INTRAVENOUS at 11:19

## 2017-03-08 RX ADMIN — ASPIRIN 81 MG 81 MG: 81 TABLET ORAL at 10:19

## 2017-03-08 RX ADMIN — MIDAZOLAM HYDROCHLORIDE 1 MG: 1 INJECTION, SOLUTION INTRAMUSCULAR; INTRAVENOUS at 11:18

## 2017-03-08 RX ADMIN — IOPAMIDOL 80 ML: 612 INJECTION, SOLUTION INTRAVENOUS at 11:00

## 2017-03-08 RX ADMIN — LIDOCAINE HYDROCHLORIDE 2 ML: 10 INJECTION, SOLUTION EPIDURAL; INFILTRATION; INTRACAUDAL; PERINEURAL at 11:20

## 2017-03-08 NOTE — ROUTINE PROCESS
TRANSFER - OUT REPORT:    Verbal report given to [facility] rn(name) on Kaylah Bodily  being transferred to [facility]) for routine progression of care       Report consisted of patients Situation, Background, Assessment and   Recommendations(SBAR). Information from the following report(s) SBAR, Procedure Summary, Intake/Output and MAR was reviewed with the receiving nurse. Lines:   Peripheral IV 03/08/17 Right Antecubital (Active)       Peripheral IV 03/08/17 Left Hand (Active)        Opportunity for questions and clarification was provided.       Patient transported with:   [employer]

## 2017-03-08 NOTE — H&P
PATIENT NAME: Thomas Sorensen 60 y.o. 1960 DATE:2/24/2017     REASON FOR VISIT: Atrial fibrillation.     HISTORY OF PRESENT ILLNESS: The patient was anticipating aortobifemoral bypass surgery. He was found to be in atrial fibrillation with a rapid ventricular response. Was denying chest discomfort and shortness of breath. Echocardiography was ordered. Pharmacologic stress testing with Cardiolite also ordered. The patient was begun on metoprolol 50 mg p.o. twice daily. The stress test was not performed because of a rapid ventricular response to the atrial fibrillation. For some reason, the echocardiogram was not performed either.     The patient denies chest discomfort and shortness of breath. Denies palpitation, syncope, presyncope. His main complaint is pain in both lower extremities.  He has rest pain in the right lower extremity.     PAST MEDICAL HISTORY:   Past Medical History:  No date: A-fib ([facility]  No date: Abnormal EKG  No date: Essential hypertension  05/04/2015: Lumbar canal stenosis  Comment: Dr. Michell Reyna  No date: PVD (peripheral vascular disease) ([facility]     PAST SURGICAL HISTORY:   Past Surgical History:  07/23/2015: HX COLONOSCOPY  Comment: polyps repeat 2020 5 years [facility]      SOCIAL HISTORY:  Social History  Marital status: LEGALLY  Spouse name:   Years of education: Number of children:      Social History Main Topics  Smoking status: Former Smoker   Packs/day: 0.00 Years: 0.00   Smokeless status: Never Used   Comment: quit in 2011  Alcohol use: Yes 1.2 oz/week  2 Cans of beer per week  Comment: 10 cans of beer a month  Drug use: Yes   Special: Marijuana  Comment: occasionally  Sexual activity: Yes Partners with: Female           ALLERGIES:   No Known Allergies      CURRENT MEDICATIONS:   Current Outpatient Prescriptions:  digoxin (LANOXIN) 0.25 mg tablet, Take one tablet twice daily for two days, then take one tablet daily  DULoxetine (CYMBALTA) 60 mg capsule, Take 1 Cap by mouth daily.  aspirin 81 mg chewable tablet, Take 81 mg by mouth daily. metoprolol tartrate (LOPRESSOR) 50 mg tablet, Take 1 Tab by mouth two (2) times a day. amLODIPine (NORVASC) 10 mg tablet, Take 1 Tab by mouth daily. cyanocobalamin (VITAMIN B-12) 1,000 mcg tablet, Take 1 Tab by mouth daily. hydrochlorothiazide (HYDRODIURIL) 25 mg tablet, Take 1 Tab by mouth daily. losartan (COZAAR) 100 mg tablet, Take 1 Tab by mouth daily. avanafil (STENDRA) 100 mg tablet, Take 1 Tab by mouth as needed for Other. Take 1 tab 15-30 mins prior to sexual acitivity  POTASSIUM PHOS,M-BASIC-D-BASIC (POTASSIUM PHOSPHATE PO), Take by mouth.     No current facility-administered medications for this visit.         REVIEW of SYSTEMS:History obtained from chart review and the patient  General ROS: 8 pound weight loss since last visit  Hematological and Lymphatic ROS: negative for - bleeding problems  Respiratory ROS: no cough, shortness of breath, or wheezing  Cardiovascular ROS: See history of present illness. Neurological ROS: no TIA or stroke symptoms     PHYSICAL EXAMINATION:   /64  Resp 18  Ht 5' 10\" (1.778 m)  Wt 81.2 kg (179 lb)  BMI 25.68 kg/m2  BP Readings from Last 3 Encounters:  02/24/17 : 110/64  02/03/17 : 98/70  02/01/17 : 144/90     Pulse Readings from Last 3 Encounters:  02/03/17 : (!) 121  02/01/17 : (!) 158  01/25/17 : 100     Wt Readings from Last 3 Encounters:  02/24/17 : 81.2 kg (179 lb)  02/03/17 : 84.8 kg (187 lb)  01/27/17 : 86.6 kg (191 lb)     General: Well-developed white male no apparent distress. Neck: No jugular venous distention. Chest: Clear to auscultation. Heart: Irregular rhythm. No murmur or gallop audible. Extremities: 1+ edema. Unable to palpate dorsalis pedis or posterior tibial pulses. Skin: Erythema both lower extremities.     EKG: Atrial fibrillation. Nonspecific ST-T changes.  Cannot rule out anteroseptal myocardial infarction age-indeterminate.     IMPRESSION:   Atrial fibrillation, rapid ventricular response  Abnormal EKG consistent with a possible prior anteroseptal myocardial infarction  Peripheral vascular disease with claudication     PLAN:  The patient's discomfort in the lower extremities is getting worse. I believe he is having rest pain. He needs an operation, but his cardiac evaluation has been delayed. I need to look at an echocardiogram as soon as possible.     Increase metoprolol to 50 mg p.o. 3 times daily.        The diagnoses and plan were discussed with patient and caregiver. All questions answered. Plan of care agreed to by all concerned.  Maryann Peacock. MD Zack      ADDENDUM dated 2/26/2017. The echocardiogram showed a depressed ejection fraction at 35-40%. He probably should have coronary arteriography prior to surgery. I have asked my office to contact him to have labs drawn. If his renal function is satisfactory, consider coronary arteriography. Addendum:    Cardiac clearance for PVD surgery.     [facility]  3/8/16

## 2017-03-08 NOTE — ROUTINE PROCESS
TRANSFER - IN REPORT:    Verbal report received from [address] (name) on Jiar Arandadain  being received from [facility] for routine progression of care      Report consisted of patients Situation, Background, Assessment and   Recommendations(SBAR). Information from the following report(s) SBAR, Procedure Summary and MAR was reviewed with the receiving nurse. Opportunity for questions and clarification was provided. Assessment completed upon patients arrival to unit and care assumed.

## 2017-03-08 NOTE — PROGRESS NOTES
Patient  Discharged home with family, right radial dressing clean dry and intact, no bleeding or hematoma to site, vital signs stable.

## 2017-03-08 NOTE — PROGRESS NOTES
Cath holding summary    Patient escorted to cath holding from waiting area ambulatory, alert and oriented x 4, voicing no complaints. Changed into gown and placed on monitor. Afib noted. NPO since MN. Lab results, med rec and H&P reviewed on chart. PIV x 2 inserted without difficulty. Family to bedside.

## 2017-03-08 NOTE — PROCEDURES
CARDIAC CATHETERIZATION LABORATORY PROCEDURE REPORT    Jennifer Colvin is a 60 y.o. male    Medical Record Number: [id number]    Primary Care Provider: Pham Angeles MD      Referral Provider: Sugey Demarco MD    PROCEDURE DATE: 3/8/2017    INDICATIONS:   Pre op evaluation; abnormal echo. MD PERFORMING PROCEDURE: Dominga Verduzco MD    PROCEDURE:  Left heart catheterization, coronary angiography and left ventriculography. ANESTHESIA: Moderate sedation and local anesthesia. EBL: 10-50 ml  CONTRAST USE: Approximately 80 ml  RADIATION: 666 mGy    Risks, benefits, and alternatives to the procedure were explained to the patient prior to the procedure. Questions were answered in detail. The patient appeared to understand and appropriate informed consent was obtained. The patient was brought to the cardiac catheterization  laboratory in a post-absorptive state and right wrist was prepped and draped in the usual sterile manner. Moderate sedation was achieved with a combination of Versed (midazolam) and Fentanyl. Lidocaine was used to secure local anesthesia. The right radial artery was cannulated using a Micro-puncture kit and a 6 Eston[place] sheath was inserted. The patient received 3000 units of IV Heparin bolus as well as 5 mg Verapamil and 200 microgram NTG through the sheath to prevent arterial vasospasm. 6 [place] Alessia Jodean Gong catheter was used to obtain selective bilateral coronary angiograms, under fluoroscopic guidance,  in multiple projections. LV angiography was performed in the WILLETT projection with a hand injection. Pressures were recorded in the LV and during pull back across the aortic valve. The following were observed. FLUOROSCOPY: There was moderate significant calcification. HEMODYNAMIC / ANGIOGRAPHIC DATA  · Left ventricle: End diastolic pressure was 9 mmHg. Left ventriculography: The EF was estimated to be around 30%. There was no diagnostic segmental wall motion abnormality. · Aorta:  There was no significant systolic pressure gradient across the aortic valve. · Mitral valve: There was no mitral regurgitation. · Coronary Angiography:  · The coronary circulation was right dominant. · Left main: Diffuse 30%. · Left anterior descending: Diffuse 30%. · Diagonal Branches: Diffuse 30%. · Circumflex: Proximal 70%; trifurcation healed ruptured plaque with residual 90% (RYLEE 3). · Obtuse Marginal Branches: Angiographically normal.  · Right coronary: Diffuse 50% with focal mid 90%. The patient was transferred to the observation area with stable vital signs and in an asymptomatic state. The sheath will be pulled and hemostasis will be secured with the application of pressure. There was no apparent immediate complication. SUMMARY:  He has severe two vessel disease. Will need coronary revascularization if open vascular surgery is anticipated. Will dc to home for out patient follow up with his primary cardiologist.    RECOMMENDATIONS:  Post-procedure care will focus on aggressive risk factor modification.      Electronically signed: Julia English MD, [facility]

## 2017-03-08 NOTE — IP AVS SNAPSHOT
[address] Patient: Silver Noel MRN: [id number] QKT:[id number] You are allergic to the following No active allergies Recent Documentation Height Weight BMI Smoking Status 1.778 m 85.7 kg 27.12 kg/m2 Former Smoker Emergency Contacts Name Discharge Info Relation Home Work Mobile Lynnette Caro DISCHARGE CAREGIVER [3] Child [2] [phone] About your hospitalization You were admitted on:  March 8, 2017 You last received care in the:  [facility] CATH HOLDING You were discharged on:  March 8, 2017 Unit phone number:  [phone] Why you were hospitalized Your primary diagnosis was:  Not on File Providers Seen During Your Hospitalizations Provider Role Specialty Primary office phone Cheng Ferguson MD Attending Provider Cardiology [phone] Your Primary Care Physician (PCP) Primary Care Physician Office Phone Office Fax Saeid Mylar [phone] Follow-up Information Follow up With Details Comments Contact Info Danita Gerber, [address] [facility] [address] [facility] [address] 
[phone] Cheng Ferguson MD In 1 week  [address] [facility] [address] 200 [facility] 
[phone] Your Appointments Friday March 17, 2017 11:00 AM EDT ROUTINE CARE with Danita Gerber MD  
[address] [facility] ([facility]) [address] [facility] [address] 200 [facility]  
[phone] Monday March 27, 2017 12:45 PM EDT PROCEDURE with [facility]  
[facility] ([facility]) [address] [address]  
[phone] Thursday April 06, 2017  9:00 AM EDT Follow Up with JUNE Euceda  
[facility] ([facility]) [address] [facility] [address] 
 [place] [address] [phone] Current Discharge Medication List  
  
CONTINUE these medications which have NOT CHANGED Dose & Instructions Dispensing Information Comments Morning Noon Evening Bedtime  
 amLODIPine 10 mg tablet Commonly known as:  Christine Romerodon Your next dose is: Today, Tomorrow Other:  _________ Dose:  10 mg Take 1 Tab by mouth daily. Quantity:  90 Tab Refills:  3  
     
   
   
   
  
 aspirin 81 mg chewable tablet Your next dose is: Today, Tomorrow Other:  _________ Dose:  81 mg Take 81 mg by mouth daily. Refills:  0  
     
   
   
   
  
 avanafil 100 mg tablet Commonly known as:  QXOHTDP Your next dose is: Today, Tomorrow Other:  _________ Dose:  100 mg Take 1 Tab by mouth as needed for Other. Take 1 tab 15-30 mins prior to sexual acitivity Quantity:  12 Tab Refills:  0  
     
   
   
   
  
 cyanocobalamin 1,000 mcg tablet Commonly known as:  VITAMIN B-12 Your next dose is: Today, Tomorrow Other:  _________ Dose:  1000 mcg Take 1 Tab by mouth daily. Quantity:  90 Tab Refills:  3 DIGITEK 0.25 mg tablet Generic drug:  digoxin Your next dose is: Today, Tomorrow Other:  _________  
   
   
 take 1 tablet by mouth twice a day then 1 tablet by mouth once daily Refills:  0 DULoxetine 60 mg capsule Commonly known as:  CYMBALTA Your next dose is: Today, Tomorrow Other:  _________ Dose:  60 mg Take 1 Cap by mouth daily. Quantity:  30 Cap Refills:  0  
     
   
   
   
  
 hydroCHLOROthiazide 25 mg tablet Commonly known as:  HYDRODIURIL Your next dose is: Today, Tomorrow Other:  _________ Dose:  25 mg Take 1 Tab by mouth daily. Quantity:  90 Tab Refills:  3  
     
   
   
   
  
 losartan 100 mg tablet Commonly known as:  COZAAR Your next dose is: Today, Tomorrow Other:  _________ Dose:  100 mg Take 1 Tab by mouth daily. Quantity:  90 Tab Refills:  3  
     
   
   
   
  
 metoprolol tartrate 50 mg tablet Commonly known as:  LOPRESSOR Your next dose is: Today, Tomorrow Other:  _________ Dose:  50 mg Take 1 Tab by mouth three (3) times daily. Quantity:  60 Tab Refills:  3  
     
   
   
   
  
 oxyCODONE-acetaminophen 5-325 mg per tablet Commonly known as:  PERCOCET Your next dose is: Today, Tomorrow Other:  _________ Dose:  1 Tab Take 1 Tab by mouth every four (4) hours as needed for Pain. Max Daily Amount: 6 Tabs. Quantity:  80 Tab Refills:  0 POTASSIUM PHOSPHATE PO Your next dose is: Today, Tomorrow Other:  _________ Take  by mouth. Refills:  0 Discharge Instructions HEART CATHETERIZATION/ANGIOGRAPHY DISCHARGE INSTRUCTIONS 1. Check puncture site frequently for swelling or bleeding. If there is any bleeding, lie down and apply pressure over the area with a clean towel or washcloth. Notify your doctor for any redness, swelling, drainage, or oozing from the puncture site. Notify your doctor for any fever or chills. 2. If the extremity becomes cold, numb, or painful call Dr Dayo North 3. Activity should be limited for the next 48 hours. Climb stairs as little as possible and avoid any stooping, bending, or strenuous activity for 48 hours. No heavy lifting (anything over 10 pounds) for 5 days. 4. You may resume your usual diet. Drink more fluids than usual. 
5. Have a responsible person drive you home and stay with you for at least 24 hours after your heart catheterization/angiography. 6. You may remove bandage from your Right and Arm in 24 hours. You may shower in 24 hours. No tub baths, hot tubs, or swimming for 1 week.  Do not place any lotions, creams, powders, or ointments over puncture site for 1 week. You may place a clean band-aid over the puncture site each day for 5 days. Change daily. Percutaneous Coronary Intervention: What to Expect at [facility] Your Recovery Percutaneous coronary intervention (PCI) is the name for procedures that are used to open a narrowed or blocked coronary artery. The two most common PCI procedures are coronary angioplasty and coronary stent placement. Your groin or arm may have a bruise and feel sore for a day or two after a percutaneous coronary intervention (PCI). You can do light activities around the house, but nothing strenuous for several days. This care sheet gives you a general idea about how long it will take for you to recover. But each person recovers at a different pace. Follow the steps below to get better as quickly as possible. How can you care for yourself at home? Activity · Do not do strenuous exercise and do not lift, pull, or push anything heavy until your doctor says it is okay. This may be for a day or two. You can walk around the house and do light activity, such as cooking. · You may shower 24 to 48 hours after the procedure, if your doctor okays it. Pat the incision dry. Do not take a bath for 1 week, or until your doctor tells you it is okay. · If the catheter was placed in your groin, try not to walk up stairs for the first couple of days. · If the catheter was placed in your arm near your wrist, do not bend your wrist deeply for the first couple of days. Be careful using your hand to get into and out of a chair or bed. · If your doctor recommends it, get more exercise. Walking is a good choice. Bit by bit, increase the amount you walk every day. Try for at least 30 minutes on most days of the week. Diet · Drink plenty of fluids to help your body flush out the dye.  If you have kidney, heart, or liver disease and have to limit fluids, talk with your doctor before you increase the amount of fluids you drink. · Keep eating a heart-healthy diet that has lots of fruits, vegetables, and whole grains. If you have not been eating this way, talk to your doctor. You also may want to talk to a dietitian. This expert can help you to learn about healthy foods and plan meals. Medicines · Your doctor will tell you if and when you can restart your medicines. He or she will also give you instructions about taking any new medicines. · If you take blood thinners, such as warfarin (Coumadin), clopidogrel (Plavix), or aspirin, be sure to talk to your doctor. He or she will tell you if and when to start taking those medicines again. Make sure that you understand exactly what your doctor wants you to do. · Your doctor will prescribe blood-thinning medicines. You will likely take aspirin plus another antiplatelet, such as clopidogrel (Plavix). It is very important that you take these medicines exactly as directed. These medicines help keep the coronary artery open and reduce your risk of a heart attack. · Call your doctor if you think you are having a problem with your medicine. Care of the catheter site · For 1 or 2 days, keep a bandage over the spot where the catheter was inserted. The bandage probably will fall off in this time. · Put ice or a cold pack on the area for 10 to 20 minutes at a time to help with soreness or swelling. Put a thin cloth between the ice and your skin. Sedation for a Medical Procedure: Care Instructions Your Care Instructions For a minor procedure or surgery, you will get a sedative to help you relax. This drug will make you sleepy. It is usually given in a vein (by IV). A shot may also be used to numb the area. If you had local anesthesia, you may feel some pain and discomfort as it wears off. If you have pain, don't be afraid to say so. Pain medicine works better if you take it before the pain gets bad. Common side effects from sedation include: · Feeling sleepy. (Your doctors and nurses will make sure you are not too sleepy to go home.) · Nausea and vomiting. This usually does not last long. · Feeling tired. Follow-up care is a key part of your treatment and safety. Be sure to make and go to all appointments, and call your doctor if you are having problems. It's also a good idea to know your test results and keep a list of the medicines you take. How can you care for yourself at home? Activity · Don't do anything for 24 hours that requires attention to detail. It takes time for the medicine effects to completely wear off. · For your safety, you should not drive or operate any machinery that could be dangerous until the medicine wears off and you can think clearly and react easily. · Rest when you feel tired. Getting enough sleep will help you recover. Diet · You can eat your normal diet, unless your doctor gives you other instructions. If your stomach is upset, try clear liquids and bland, low-fat foods like plain toast or rice. · Drink plenty of fluids (unless your doctor tells you not to). · Don't drink alcohol for 24 hours. Medicines · Be safe with medicines. Read and follow all instructions on the label. ¨ If the doctor gave you a prescription medicine for pain, take it as prescribed. ¨ If you are not taking a prescription pain medicine, ask your doctor if you can take an over-the-counter medicine. · If you think your pain medicine is making you sick to your stomach: 
¨ Take your medicine after meals (unless your doctor has told you not to). ¨ Ask your doctor for a different pain medicine. Follow-up care is a key part of your treatment and safety. Be sure to make and go to all appointments, and call your doctor if you are having problems. It's also a good idea to know your test results and keep a list of the medicines you take. When should you call for help? Call 911 anytime you think you may need emergency care. For example, call if: 
· You passed out (lost consciousness). · You have severe trouble breathing. · You have sudden chest pain and shortness of breath, or you cough up blood. · You have symptoms of a heart attack, such as: ¨ Chest pain or pressure. ¨ Sweating. ¨ Shortness of breath. ¨ Nausea or vomiting. ¨ Pain that spreads from the chest to the neck, jaw, or one or both shoulders or arms. ¨ Dizziness or lightheadedness. ¨ A fast or uneven pulse. After calling 911, chew 1 adult-strength aspirin. Wait for an ambulance. Do not try to drive yourself. · You have been diagnosed with angina, and you have angina symptoms that do not go away with rest or are not getting better within 5 minutes after you take one dose of nitroglycerin. Call your doctor now or seek immediate medical care if: 
· You are bleeding from the area where the catheter was put in your artery. · You have a fast-growing, painful lump at the catheter site. · You have signs of infection, such as: 
¨ Increased pain, swelling, warmth, or redness. ¨ Red streaks leading from the catheter site. ¨ Pus draining from the catheter site. ¨ A fever. · Your leg or arm looks blue or feels cold, numb, or tingly. These are general instructions for a healthy lifestyle: No smoking/ No tobacco products/ Avoid exposure to second hand smoke Surgeon General's Warning:  Quitting smoking now greatly reduces serious risk to your health. Obesity, smoking, and sedentary lifestyle greatly increases your risk for illness A healthy diet, regular physical exercise & weight monitoring are important for maintaining a healthy lifestyle You may be retaining fluid if you have a history of heart failure or if you experience any of the following symptoms:  Weight gain of 3 pounds or more overnight or 5 pounds in a week, increased swelling in our hands or feet or shortness of breath while lying flat in bed. Please call your doctor as soon as you notice any of these symptoms; do not wait until your next office visit. Recognize signs and symptoms of STROKE: 
 
F-face looks uneven A-arms unable to move or move unevenly S-speech slurred or non-existent T-time-call 911 as soon as signs and symptoms begin-DO NOT go Back to bed or wait to see if you get better-TIME IS BRAIN. Warning Signs of HEART ATTACK Call 911 if you have these symptoms: 
? Chest discomfort. Most heart attacks involve discomfort in the center of the chest that lasts more than a few minutes, or that goes away and comes back. It can feel like uncomfortable pressure, squeezing, fullness, or pain. ? Discomfort in other areas of the upper body. Symptoms can include pain or discomfort in one or both arms, the back, neck, jaw, or stomach. ? Shortness of breath with or without chest discomfort. ? Other signs may include breaking out in a cold sweat, nausea, or lightheadedness. Don't wait more than five minutes to call [address]! Fast action can save your life. Calling 911 is almost always the fastest way to get lifesaving treatment. Emergency Medical Services staff can begin treatment when they arrive  up to an hour sooner than if someone gets to the hospital by car. The discharge information has been reviewed with the patient and caregiver. The patient and caregiver verbalized understanding. Discharge medications reviewed with the patient and caregiver and appropriate educational materials and side effects teaching were provided. Patient armband removed and shredded Discharge Orders None Introducing [facility]! [employer] introduces [employer] patient portal. Now you can access parts of your medical record, email your doctor's office, and request medication refills online.    
 
1. In your internet browser, go to [email or web address] 2. Click on the First Time User? Click Here link in the Sign In box. You will see the New Member Sign Up page. 3. Enter your [employer] Access Code exactly as it appears below. You will not need to use this code after youve completed the sign-up process. If you do not sign up before the expiration date, you must request a new code. · [employer] Access Code: [email or web address] Expires: 4/11/2017 12:44 PM 
 
4. Enter the last four digits of your Social Security Number (xxxx) and Date of Birth (mm/dd/yyyy) as indicated and click Submit. You will be taken to the next sign-up page. 5. Create a [employer] ID. This will be your [employer] login ID and cannot be changed, so think of one that is secure and easy to remember. 6. Create a [employer] password. You can change your password at any time. 7. Enter your Password Reset Question and Answer. This can be used at a later time if you forget your password. 8. Enter your e-mail address. You will receive e-mail notification when new information is available in [address] 9. Click Sign Up. You can now view and download portions of your medical record. 10. Click the Download Summary menu link to download a portable copy of your medical information. If you have questions, please visit the Frequently Asked Questions section of the [employer] website. Remember, [employer] is NOT to be used for urgent needs. For medical emergencies, dial 911. Now available from your iPhone and Android! General Information Please provide this summary of care documentation to your next provider. Patient Signature:  ____________________________________________________________ Date:  ____________________________________________________________  
  
Aiyana Sleight Provider Signature:  ____________________________________________________________ Date:  ____________________________________________________________

## 2017-03-08 NOTE — PROGRESS NOTES
Patient arrived to unit awake and alert, vital signs stable Dstat band in place, clean, dry and intact, no C/O pain at this time. Family at bedside will continue to monitor.

## 2017-03-10 ENCOUNTER — TELEPHONE (OUTPATIENT)
Dept: CARDIOLOGY CLINIC | Age: 57
End: 2017-03-10

## 2017-03-10 NOTE — TELEPHONE ENCOUNTER
[facility] [place] [facility]\""          Patient  EP Instructions                  1. You are scheduled to have a stent placement on  March 22, 2017 , at [address]. Please check in at 0815.    2. [facility] go to [facility] and park in the outpatient parking lot that is located around to the back of the hospital and enter through the [facility] building. Once you enter through the [facility] check in with the  there. The  will either give you directions or assist you in getting to the cath holding area. 3.  []       You are not to eat or drink anything after midnight the morning of the               procedure. 4. Please continue to take your medications with a small sip of water on the morning of the procedure with the following exceptions: Continue all current medications     5. If you are diabetic, do not take your insulin/sugar pill the morning of the procedure. 6. We encourage families to wait in the waiting room on the first floor while the procedure is being done. The Doctor will come out and talk with you as soon as the procedure is over. 7. There is the possibility that you may spend the night in the hospital, depending on the results of the procedure. This will be determined after the procedure is done. 8.   If you or your family have any questions, please call our office Monday-Friday 9:00am         -4:30 pm , at [phone], and ask to speak to one of the nurses.

## 2017-03-10 NOTE — TELEPHONE ENCOUNTER
Patient is scheduled for 3/15 @ 9:15am with Dr. rFancesca Hook. Please call patient with instructions. Keenan Gonzales, can you schedule him for multivessel stents for next week? Sent from my iPhone    On Mar 9, 2017, at 3:40 PM, [email or web address] wrote:  Julianne Ortega said that he could do an axillary bifemoral with the patient on Plavix, so you can go ahead and fix the cors. Durwood Levels FromMellie Canales, [email or web address]  Date: March 8, 2017 at 11:45:17 AM EST  To: [email or web address]  Cc: [email or web address]  Subject: Allison Avery        Has severe 2 vessel disease, circumflex and rca. Complex stents. Did not do today with severe claudication. If pvd stent, he should tolerate. If open, he would not tolerate. He needs to see you may be later this week.       Nicolas

## 2017-03-13 ENCOUNTER — TELEPHONE (OUTPATIENT)
Dept: CARDIOLOGY CLINIC | Age: 57
End: 2017-03-13

## 2017-03-13 RX ORDER — SODIUM CHLORIDE 0.9 % (FLUSH) 0.9 %
5-10 SYRINGE (ML) INJECTION EVERY 8 HOURS
Status: CANCELLED | OUTPATIENT
Start: 2017-03-13

## 2017-03-13 RX ORDER — SODIUM CHLORIDE 0.9 % (FLUSH) 0.9 %
5-10 SYRINGE (ML) INJECTION AS NEEDED
Status: CANCELLED | OUTPATIENT
Start: 2017-03-13

## 2017-03-13 NOTE — TELEPHONE ENCOUNTER
Instructions    Patients Name:  Roberto Garcia    1. You are scheduled to have a Cardiac Cath  on March 15, 2017  at 0915 am Please check in at 0800 am  .     2. Please go to [facility] and park in the outpatient parking lot that is located around to the back of the hospital and enter through the [place]. Once you enter through the [facility] check in with the  there. The  will either give you directions or assist you in getting to the cath holding area. 3. You are not to eat anything after midnight the morning of the procedure. Please continue to drink fluids up until 0730 am.  (water, juice, black coffee, soft drinks). Do not drink milk or milk products or anything with cream.    4. If you are diabetic, do not take your insulin/sugar pill the morning of the procedure. 5. MEDICATION INSTRUCTIONS:   Please take your morning medications with the following special instructions:    [x]          Please make sure to take your Blood pressure medication : Continue all medications     [x]          Take your Aspirin. 6. We encourage families to wait in the waiting room on the first floor while the procedure is being done. The Doctor will come out and talk with you as soon as the procedure is over. 7. There is the possibility that you may spend the night in the hospital, depending on the results of the procedure. This will be determined after the procedure is done. If angioplasty or stent is planned, you will stay at least one day. 8. If you or your family have any questions, please call our office Monday -Friday, 9:00 a.m.-4:30 p.m.,  At [phone], and ask to speak to one of the nurses.

## 2017-03-15 ENCOUNTER — HOSPITAL ENCOUNTER (OUTPATIENT)
Dept: CARDIAC CATH/INVASIVE PROCEDURES | Age: 57
Setting detail: OBSERVATION
Discharge: HOME | End: 2017-03-16
Attending: INTERNAL MEDICINE | Admitting: INTERNAL MEDICINE
Payer: COMMERCIAL

## 2017-03-15 PROBLEM — I25.10 CAD (CORONARY ARTERY DISEASE): Status: ACTIVE | Noted: 2017-03-15

## 2017-03-15 LAB
ACT BLD: 492 SECS (ref 79–138)
ANION GAP BLD CALC-SCNC: 11 MMOL/L (ref 3–18)
BUN SERPL-MCNC: 24 MG/DL (ref 7–18)
BUN/CREAT SERPL: 14 (ref 12–20)
CALCIUM SERPL-MCNC: 9.4 MG/DL (ref 8.5–10.1)
CHLORIDE SERPL-SCNC: 97 MMOL/L (ref 100–108)
CO2 SERPL-SCNC: 26 MMOL/L (ref 21–32)
CREAT SERPL-MCNC: 1.67 MG/DL (ref 0.6–1.3)
ERYTHROCYTE [DISTWIDTH] IN BLOOD BY AUTOMATED COUNT: 14.5 % (ref 11.6–14.5)
GLUCOSE SERPL-MCNC: 78 MG/DL (ref 74–99)
HCT VFR BLD AUTO: 40.8 % (ref 36–48)
HGB BLD-MCNC: 14.3 G/DL (ref 13–16)
INR PPP: 1.1 (ref 0.8–1.2)
MCH RBC QN AUTO: 30.6 PG (ref 24–34)
MCHC RBC AUTO-ENTMCNC: 35 G/DL (ref 31–37)
MCV RBC AUTO: 87.4 FL (ref 74–97)
PLATELET # BLD AUTO: 240 K/UL (ref 135–420)
PMV BLD AUTO: 10 FL (ref 9.2–11.8)
POTASSIUM SERPL-SCNC: 3.4 MMOL/L (ref 3.5–5.5)
PROTHROMBIN TIME: 13.7 SEC (ref 11.5–15.2)
RBC # BLD AUTO: 4.67 M/UL (ref 4.7–5.5)
SODIUM SERPL-SCNC: 134 MMOL/L (ref 136–145)
WBC # BLD AUTO: 12.4 K/UL (ref 4.6–13.2)

## 2017-03-15 PROCEDURE — 85610 PROTHROMBIN TIME: CPT | Performed by: INTERNAL MEDICINE

## 2017-03-15 PROCEDURE — 74011250636 HC RX REV CODE- 250/636: Performed by: INTERNAL MEDICINE

## 2017-03-15 PROCEDURE — 99218 HC RM OBSERVATION: CPT

## 2017-03-15 PROCEDURE — 85347 COAGULATION TIME ACTIVATED: CPT

## 2017-03-15 PROCEDURE — 85027 COMPLETE CBC AUTOMATED: CPT | Performed by: INTERNAL MEDICINE

## 2017-03-15 PROCEDURE — 74011250637 HC RX REV CODE- 250/637: Performed by: INTERNAL MEDICINE

## 2017-03-15 PROCEDURE — 80048 BASIC METABOLIC PNL TOTAL CA: CPT | Performed by: INTERNAL MEDICINE

## 2017-03-15 PROCEDURE — 74011000250 HC RX REV CODE- 250: Performed by: INTERNAL MEDICINE

## 2017-03-15 PROCEDURE — 74011636320 HC RX REV CODE- 636/320: Performed by: INTERNAL MEDICINE

## 2017-03-15 PROCEDURE — 92929 CARDIAC CATHETERIZATION: CPT

## 2017-03-15 PROCEDURE — 74011000258 HC RX REV CODE- 258: Performed by: INTERNAL MEDICINE

## 2017-03-15 RX ORDER — DULOXETIN HYDROCHLORIDE 30 MG/1
60 CAPSULE, DELAYED RELEASE ORAL DAILY
Status: DISCONTINUED | OUTPATIENT
Start: 2017-03-15 | End: 2017-03-16 | Stop reason: HOSPADM

## 2017-03-15 RX ORDER — HEPARIN SODIUM 200 [USP'U]/100ML
500 INJECTION, SOLUTION INTRAVENOUS ONCE
Status: COMPLETED | OUTPATIENT
Start: 2017-03-15 | End: 2017-03-15

## 2017-03-15 RX ORDER — FENTANYL CITRATE 50 UG/ML
12.5-1 INJECTION, SOLUTION INTRAMUSCULAR; INTRAVENOUS
Status: DISCONTINUED | OUTPATIENT
Start: 2017-03-15 | End: 2017-03-15 | Stop reason: HOSPADM

## 2017-03-15 RX ORDER — ASPIRIN 81 MG/1
81 TABLET ORAL DAILY
Status: DISCONTINUED | OUTPATIENT
Start: 2017-03-16 | End: 2017-03-16 | Stop reason: HOSPADM

## 2017-03-15 RX ORDER — SODIUM CHLORIDE 0.9 % (FLUSH) 0.9 %
5-10 SYRINGE (ML) INJECTION EVERY 8 HOURS
Status: DISCONTINUED | OUTPATIENT
Start: 2017-03-15 | End: 2017-03-16 | Stop reason: HOSPADM

## 2017-03-15 RX ORDER — SODIUM CHLORIDE 0.9 % (FLUSH) 0.9 %
5-10 SYRINGE (ML) INJECTION AS NEEDED
Status: DISCONTINUED | OUTPATIENT
Start: 2017-03-15 | End: 2017-03-15 | Stop reason: HOSPADM

## 2017-03-15 RX ORDER — LANOLIN ALCOHOL/MO/W.PET/CERES
1000 CREAM (GRAM) TOPICAL DAILY
Status: DISCONTINUED | OUTPATIENT
Start: 2017-03-15 | End: 2017-03-16 | Stop reason: HOSPADM

## 2017-03-15 RX ORDER — METOPROLOL TARTRATE 50 MG/1
50 TABLET ORAL 3 TIMES DAILY
Status: DISCONTINUED | OUTPATIENT
Start: 2017-03-15 | End: 2017-03-16 | Stop reason: HOSPADM

## 2017-03-15 RX ORDER — CLOPIDOGREL BISULFATE 75 MG/1
75 TABLET ORAL DAILY
Qty: 30 TAB | Refills: 11 | Status: SHIPPED | OUTPATIENT
Start: 2017-03-16 | End: 2017-04-29

## 2017-03-15 RX ORDER — SODIUM CHLORIDE 0.9 % (FLUSH) 0.9 %
5-10 SYRINGE (ML) INJECTION EVERY 8 HOURS
Status: DISCONTINUED | OUTPATIENT
Start: 2017-03-15 | End: 2017-03-15 | Stop reason: HOSPADM

## 2017-03-15 RX ORDER — CLOPIDOGREL 300 MG/1
600 TABLET, FILM COATED ORAL
Status: COMPLETED | OUTPATIENT
Start: 2017-03-15 | End: 2017-03-15

## 2017-03-15 RX ORDER — NITROGLYCERIN 400 UG/1
1 SPRAY ORAL
Status: DISCONTINUED | OUTPATIENT
Start: 2017-03-15 | End: 2017-03-16 | Stop reason: HOSPADM

## 2017-03-15 RX ORDER — LIDOCAINE HYDROCHLORIDE 10 MG/ML
1-30 INJECTION, SOLUTION EPIDURAL; INFILTRATION; INTRACAUDAL; PERINEURAL
Status: DISCONTINUED | OUTPATIENT
Start: 2017-03-15 | End: 2017-03-15 | Stop reason: HOSPADM

## 2017-03-15 RX ORDER — OXYCODONE AND ACETAMINOPHEN 5; 325 MG/1; MG/1
1 TABLET ORAL
Status: DISCONTINUED | OUTPATIENT
Start: 2017-03-15 | End: 2017-03-16 | Stop reason: HOSPADM

## 2017-03-15 RX ORDER — CLOPIDOGREL BISULFATE 75 MG/1
75 TABLET ORAL DAILY
Status: DISCONTINUED | OUTPATIENT
Start: 2017-03-16 | End: 2017-03-16 | Stop reason: HOSPADM

## 2017-03-15 RX ORDER — SODIUM CHLORIDE 450 MG/100ML
150 INJECTION, SOLUTION INTRAVENOUS CONTINUOUS
Status: DISPENSED | OUTPATIENT
Start: 2017-03-15 | End: 2017-03-15

## 2017-03-15 RX ORDER — HYDROCHLOROTHIAZIDE 25 MG/1
25 TABLET ORAL DAILY
Status: DISCONTINUED | OUTPATIENT
Start: 2017-03-16 | End: 2017-03-16 | Stop reason: HOSPADM

## 2017-03-15 RX ORDER — DIGOXIN 250 MCG
0.25 TABLET ORAL DAILY
Status: DISCONTINUED | OUTPATIENT
Start: 2017-03-15 | End: 2017-03-16 | Stop reason: HOSPADM

## 2017-03-15 RX ORDER — BIVALIRUDIN 250 MG/5ML
0.75 INJECTION, POWDER, LYOPHILIZED, FOR SOLUTION INTRAVENOUS ONCE
Status: COMPLETED | OUTPATIENT
Start: 2017-03-15 | End: 2017-03-15

## 2017-03-15 RX ORDER — LOSARTAN POTASSIUM 25 MG/1
100 TABLET ORAL DAILY
Status: DISCONTINUED | OUTPATIENT
Start: 2017-03-15 | End: 2017-03-16 | Stop reason: HOSPADM

## 2017-03-15 RX ORDER — VERAPAMIL HYDROCHLORIDE 2.5 MG/ML
5 INJECTION, SOLUTION INTRAVENOUS ONCE
Status: COMPLETED | OUTPATIENT
Start: 2017-03-15 | End: 2017-03-15

## 2017-03-15 RX ORDER — HEPARIN SODIUM 1000 [USP'U]/ML
1000-10000 INJECTION, SOLUTION INTRAVENOUS; SUBCUTANEOUS
Status: DISCONTINUED | OUTPATIENT
Start: 2017-03-15 | End: 2017-03-15 | Stop reason: HOSPADM

## 2017-03-15 RX ORDER — GUAIFENESIN 100 MG/5ML
81 LIQUID (ML) ORAL DAILY
Status: DISCONTINUED | OUTPATIENT
Start: 2017-03-15 | End: 2017-03-15 | Stop reason: SDUPTHER

## 2017-03-15 RX ORDER — AMLODIPINE BESYLATE 10 MG/1
10 TABLET ORAL DAILY
Status: DISCONTINUED | OUTPATIENT
Start: 2017-03-15 | End: 2017-03-16 | Stop reason: HOSPADM

## 2017-03-15 RX ORDER — MIDAZOLAM HYDROCHLORIDE 1 MG/ML
.5-2 INJECTION, SOLUTION INTRAMUSCULAR; INTRAVENOUS
Status: DISCONTINUED | OUTPATIENT
Start: 2017-03-15 | End: 2017-03-15 | Stop reason: HOSPADM

## 2017-03-15 RX ORDER — SODIUM CHLORIDE 0.9 % (FLUSH) 0.9 %
5-10 SYRINGE (ML) INJECTION AS NEEDED
Status: DISCONTINUED | OUTPATIENT
Start: 2017-03-15 | End: 2017-03-16 | Stop reason: HOSPADM

## 2017-03-15 RX ADMIN — BIVALIRUDIN 61 MG: 250 INJECTION, POWDER, LYOPHILIZED, FOR SOLUTION INTRAVENOUS at 09:45

## 2017-03-15 RX ADMIN — Medication 10 ML: at 21:08

## 2017-03-15 RX ADMIN — FENTANYL CITRATE 25 MCG: 50 INJECTION INTRAMUSCULAR; INTRAVENOUS at 09:37

## 2017-03-15 RX ADMIN — METOPROLOL TARTRATE 50 MG: 50 TABLET ORAL at 22:00

## 2017-03-15 RX ADMIN — FENTANYL CITRATE 25 MCG: 50 INJECTION INTRAMUSCULAR; INTRAVENOUS at 10:04

## 2017-03-15 RX ADMIN — MIDAZOLAM HYDROCHLORIDE 1 MG: 1 INJECTION, SOLUTION INTRAMUSCULAR; INTRAVENOUS at 09:32

## 2017-03-15 RX ADMIN — HEPARIN SODIUM 1000 UNITS: 200 INJECTION, SOLUTION INTRAVENOUS at 09:37

## 2017-03-15 RX ADMIN — HEPARIN SODIUM 3000 UNITS: 1000 INJECTION, SOLUTION INTRAVENOUS; SUBCUTANEOUS at 09:40

## 2017-03-15 RX ADMIN — OXYCODONE HYDROCHLORIDE AND ACETAMINOPHEN 1 TABLET: 5; 325 TABLET ORAL at 20:11

## 2017-03-15 RX ADMIN — NITROGLYCERIN 200 MCG: 5 INJECTION, SOLUTION INTRAVENOUS at 09:39

## 2017-03-15 RX ADMIN — CLOPIDOGREL BISULFATE 600 MG: 300 TABLET, FILM COATED ORAL at 10:23

## 2017-03-15 RX ADMIN — BIVALIRUDIN 1.75 MG/KG/HR: 250 INJECTION, POWDER, LYOPHILIZED, FOR SOLUTION INTRAVENOUS at 09:50

## 2017-03-15 RX ADMIN — METOPROLOL TARTRATE 50 MG: 50 TABLET ORAL at 16:03

## 2017-03-15 RX ADMIN — IOPAMIDOL 165 ML: 612 INJECTION, SOLUTION INTRAVENOUS at 10:19

## 2017-03-15 RX ADMIN — LIDOCAINE HYDROCHLORIDE 4 ML: 10 INJECTION, SOLUTION EPIDURAL; INFILTRATION; INTRACAUDAL; PERINEURAL at 09:38

## 2017-03-15 RX ADMIN — VERAPAMIL HYDROCHLORIDE 5 MG: 2.5 INJECTION, SOLUTION INTRAVENOUS at 09:38

## 2017-03-15 NOTE — ROUTINE PROCESS
TRANSFER - OUT REPORT:    Verbal report given to [address], RN(name) on Elliott Barton  being transferred to [facility]) for ordered procedure       Report consisted of patients Situation, Background, Assessment and   Recommendations(SBAR). Information from the following report(s) SBAR, Kardex, Procedure Summary and MAR was reviewed with the receiving nurse. Lines:   Peripheral IV 03/15/17 Right Antecubital (Active)   Site Assessment Clean, dry, & intact 3/15/2017  8:15 AM   Phlebitis Assessment 0 3/15/2017  8:15 AM   Infiltration Assessment 0 3/15/2017  8:15 AM   Dressing Status New 3/15/2017  8:15 AM   Dressing Type Transparent 3/15/2017  8:15 AM   Hub Color/Line Status Pink;Flushed;Patent 3/15/2017  8:15 AM   Action Taken Blood drawn 3/15/2017  8:15 AM   Alcohol Cap Used Yes 3/15/2017  8:15 AM       Peripheral IV 03/15/17 Left Wrist (Active)   Site Assessment Clean, dry, & intact 3/15/2017  8:15 AM   Phlebitis Assessment 0 3/15/2017  8:15 AM   Infiltration Assessment 0 3/15/2017  8:15 AM   Dressing Status New 3/15/2017  8:15 AM   Dressing Type Transparent 3/15/2017  8:15 AM   Hub Color/Line Status Blue;Flushed;Patent 3/15/2017  8:15 AM        Opportunity for questions and clarification was provided.       Patient transported with:   [employer]

## 2017-03-15 NOTE — ROUTINE PROCESS
TRANSFER - IN REPORT:    Verbal report received from Ro Patterson RN(name) on [place]  being received from [facility](unit) for ordered procedure      Report consisted of patients Situation, Background, Assessment and   Recommendations(SBAR). Information from the following report(s) SBAR, Intake/Output, MAR and Recent Results was reviewed with the receiving nurse. Opportunity for questions and clarification was provided. Assessment completed upon patients arrival to unit and care assumed.

## 2017-03-15 NOTE — PROGRESS NOTES
Pt up to bedside to use urinal, voided 600ml's; pt's daughter has left for the day to return to work; pt is waiting for bed assignment in the hospital CVT/stepdown; pt declined offer of lunch and something to drink; he is sipping on bottled water

## 2017-03-15 NOTE — PROGRESS NOTES
conducted an initial consultation and Spiritual Assessment for Satish Coronel, who is a 60 y. o.,male. Patients Primary Language is: [place]. According to the patients EMR Yarsanism Affiliation is: No preference. The reason the Patient came to the hospital is:   Patient Active Problem List    Diagnosis Date Noted    CAD (coronary artery disease) 03/15/2017    Aortoiliac occlusive disease ([facility] 01/25/2017    Atherosclerosis of native artery of both lower extremities with intermittent claudication ([facility] 01/25/2017    PAD (peripheral artery disease) ([facility] 01/25/2017    Spinal stenosis, lumbar 12/13/2016    Aortic valve stenosis 12/13/2016    Hereditary and idiopathic neuropathy, unspecified 12/13/2016    Radiculopathy, lumbar region 12/13/2016    Lumbar neuritis 11/15/2016    Spinal stenosis, lumbar region, without neurogenic claudication 11/15/2016    Hereditary peripheral neuropathy 11/15/2016    Erectile dysfunction 07/05/2016    Hypertension 01/26/2015    Vitamin B12 deficiency 01/26/2015    Low back pain 01/26/2015    DDD (degenerative disc disease), lumbar 01/26/2015        The  provided the following Interventions:  Initiated a relationship of care and support. Explored issues of dl, belief, spirituality and Restorationist/ritual needs while hospitalized. Listened empathically. Provided chaplaincy education. Provided information about Spiritual Care Services. Offered prayer and assurance of continued prayers on patient's behalf. Chart reviewed. The following outcomes where achieved:  Patient shared limited information about both their medical narrative and spiritual journey/beliefs.  confirmed Patient's Yarsanism Affiliation. Patient processed feeling about current hospitalization. Patient expressed gratitude for 's visit.     Assessment:  Patient does not have any Restorationist/cultural needs that will affect patients preferences in health care.  There are no spiritual or Sabianism issues which require intervention at this time. Plan:  Chaplains will continue to follow and will provide pastoral care on an as needed/requested basis.  recommends bedside caregivers page  on duty if patient shows signs of acute spiritual or emotional distress. Chaplain Selina BELTRÁN  [address] [facility]  [phone]

## 2017-03-15 NOTE — PROCEDURES
CARDIAC CATHETERIZATION LABORATORY PROCEDURE REPORT    Sunny Mi is a 60 y.o. male    Medical Record Number: [id number]    Primary Care Provider: Sancho Merrill MD      Referral Provider: Ross Darden MD    Procedure Date: 3/15/2017    INDICATIONS:  Known CAD with upcoming vascular surgery. MD PERFORMING PROCEDURE: Melecio Grullon MD    PROCEDURE:  Left heart catheterization, coronary angiography, left ventriculography and coronary artery stenting. ANESTHESIA: Moderate sedation and local anesthesia. EBL: 30-50 ml  Contrast: 165 ml  Radiation exposure: 1803 mGy    Risks, benefits, and alternatives were explained to the patient and appropriate informed consent was obtained prior to the procedure. The patient was brought to the cath lab in a post-absorptive state and he was prepped and draped in the usual sterile manner. Moderate sedation was achieved with the combination of Versed (midazolam) and Fentanyl. Lidocaine was used to secure local anesthesia. The right radial artery was cannulated using a micropuncture kit and a 6F Western Alessia sheath was inserted. The patient received 3000 units of IV Heparin bolus as well as 5 mg Verapamil and 200 microgram NTG through the sheath to prevent arterial vasospasm. Six Western Alessia Karen Connor catheter was used to obtain selective bilateral coronary angiograms in multiple projections    FLUOROSCOPY: There was severe apparent calcification. HEMODYNAMIC / ANGIOGRAPHIC DATA  Has known critical Cx and RCA disease. The Cx has proximal 80-85% with 90% mid to distal healed ruptured plaque with RYLEE 3 flow. The RCA has diffuse 30-40% but mid 90% focal stenosis. INTERVENTION:  A weight adjusted loading dose of IV Angiomax was given and infusion of the drug was started . Using a 6 Western Alessia AL 1 guiding catheter, the lesion in the distal Cx coronary artery was crossed with a 0.014\" Balance guide wire.  Predilatation was performed with 2.5 mm PTCA balloon ( 12 mm length) followed by stenting with  3.0 mm Xience LESLEY (15 mm length) stent. Additional balloon expansion was carried out using higher inflation pressures. Intracoronary NTG was used during the procedure. Excellent angiographic results were observed and RYLEE 3 flow was noted after the PCI. Then, the lesion in the proximal Cx coronary artery predilatation was performed with 2.5 mm PTCA balloon ( 12 mm length) followed by stenting with  3.25 mm Xience LESLEY (12 mm length) stent. Additional balloon expansion was carried out using higher inflation pressures. Intracoronary NTG was used during the procedure. Excellent angiographic results were observed and RYLEE 3 flow was noted after the PCI. Using a 6 Western Alessia JR 4 guiding catheter, the lesion in the Right coronary artery was crossed with a 0.014\" Balance guide wire. Predilatation was performed with 2.5 mm PTCA balloon ( 12 mm length) followed by stenting with  2.5 mm Xience LESLEY (15 mm length) stent. Additional balloon expansion was carried out using higher inflation pressures. Intracoronary NTG was used during the procedure. Excellent angiographic results were observed and RYLEE 3 flow was noted after the PCI. After an appropriate waiting phase final coronary angiograms were obtained and the catheter was withdrawn. Oral antiplatelet therapy was administered in the cardiac cath lab in the form of aspirin 81 mg and Plavix 600 mg. The procedure was well tolerated and there was no apparent immediate complication. The sheath was removed and hemostasis was accomplished using D-Stat Hemoband. The patient was transferred to the observation area with stable vital signs and in an asymptomatic state. SUMMARY:  Successful stenting of Cx and RCA to 0%. No apparent immediate complication. RECOMMENDATIONS:  Post-procedure care will focus on aggressive risk factor modification. Benefits of tobacco abstinence discussed.     Electronically signed Danielle Johnson MD

## 2017-03-15 NOTE — PROGRESS NOTES
Dstat band removed from right radial wrist by BEAU Farmer, dressing applied of 4x4 and elasticon tape; no s/s of hematoma, splint replaced

## 2017-03-15 NOTE — ROUTINE PROCESS
Bedside shift change report given to Michelle  (oncoming nurse) by José Friend (offgoing nurse). Report included the following information SBAR.

## 2017-03-15 NOTE — PROGRESS NOTES
TRANSFER - IN REPORT:    Verbal report received from cath lab(name) on Aydin Gusman  being received from Marcela(unit) for routine progression of care      Report consisted of patients Situation, Background, Assessment and   Recommendations(SBAR). Information from the following report(s) SBAR was reviewed with the receiving nurse. Opportunity for questions and clarification was provided. Assessment completed upon patients arrival to unit and care assumed.

## 2017-03-15 NOTE — PROGRESS NOTES
Pt hourly rounding competed. Safety   Pt (x) resting on stretcher with side rails up and call bell in reach. () in chair    () in parents arms. Toileting   Pt offered ()Bedpan     ()Assistance to Restroom     x(x)Urinal  Ongoing Updates  Updated on plan of care and status of test results.   Pain Management  Inquired as to comfort and offered comfort measures:    (x) warm blankets   (x) dimmed lights

## 2017-03-15 NOTE — H&P
PATIENT NAME: Werner Gonzales 60 y.o. 1960 DATE:2/24/2017     REASON FOR VISIT: Atrial fibrillation.     HISTORY OF PRESENT ILLNESS: The patient was anticipating aortobifemoral bypass surgery. He was found to be in atrial fibrillation with a rapid ventricular response. Was denying chest discomfort and shortness of breath. Echocardiography was ordered. Pharmacologic stress testing with Cardiolite also ordered. The patient was begun on metoprolol 50 mg p.o. twice daily. The stress test was not performed because of a rapid ventricular response to the atrial fibrillation. For some reason, the echocardiogram was not performed either.     The patient denies chest discomfort and shortness of breath. Denies palpitation, syncope, presyncope. His main complaint is pain in both lower extremities.  He has rest pain in the right lower extremity.     PAST MEDICAL HISTORY:   Past Medical History:  No date: A-fib ([facility]  No date: Abnormal EKG  No date: Essential hypertension  05/04/2015: Lumbar canal stenosis  Comment: Dr. Zaki Lott  No date: PVD (peripheral vascular disease) ([facility]     PAST SURGICAL HISTORY:   Past Surgical History:  07/23/2015: HX COLONOSCOPY  Comment: polyps repeat 2020 5 years Hoang Henderson      SOCIAL HISTORY:  Social History  Marital status: LEGALLY  Spouse name:   Years of education: Number of children:      Social History Main Topics  Smoking status: Former Smoker   Packs/day: 0.00 Years: 0.00   Smokeless status: Never Used   Comment: quit in 2011  Alcohol use: Yes 1.2 oz/week  2 Cans of beer per week  Comment: 10 cans of beer a month  Drug use: Yes   Special: Marijuana  Comment: occasionally  Sexual activity: Yes Partners with: Female           ALLERGIES:   No Known Allergies      CURRENT MEDICATIONS:   Current Outpatient Prescriptions:  digoxin (LANOXIN) 0.25 mg tablet, Take one tablet twice daily for two days, then take one tablet daily  DULoxetine (CYMBALTA) 60 mg capsule, Take 1 Cap by mouth daily.  aspirin 81 mg chewable tablet, Take 81 mg by mouth daily. metoprolol tartrate (LOPRESSOR) 50 mg tablet, Take 1 Tab by mouth two (2) times a day. amLODIPine (NORVASC) 10 mg tablet, Take 1 Tab by mouth daily. cyanocobalamin (VITAMIN B-12) 1,000 mcg tablet, Take 1 Tab by mouth daily. hydrochlorothiazide (HYDRODIURIL) 25 mg tablet, Take 1 Tab by mouth daily. losartan (COZAAR) 100 mg tablet, Take 1 Tab by mouth daily. avanafil (STENDRA) 100 mg tablet, Take 1 Tab by mouth as needed for Other. Take 1 tab 15-30 mins prior to sexual acitivity  POTASSIUM PHOS,M-BASIC-D-BASIC (POTASSIUM PHOSPHATE PO), Take by mouth.     No current facility-administered medications for this visit.         REVIEW of SYSTEMS:History obtained from chart review and the patient  General ROS: 8 pound weight loss since last visit  Hematological and Lymphatic ROS: negative for - bleeding problems  Respiratory ROS: no cough, shortness of breath, or wheezing  Cardiovascular ROS: See history of present illness. Neurological ROS: no TIA or stroke symptoms     PHYSICAL EXAMINATION:   /64  Resp 18  Ht 5' 10\" (1.778 m)  Wt 81.2 kg (179 lb)  BMI 25.68 kg/m2  BP Readings from Last 3 Encounters:  02/24/17 : 110/64  02/03/17 : 98/70  02/01/17 : 144/90     Pulse Readings from Last 3 Encounters:  02/03/17 : (!) 121  02/01/17 : (!) 158  01/25/17 : 100     Wt Readings from Last 3 Encounters:  02/24/17 : 81.2 kg (179 lb)  02/03/17 : 84.8 kg (187 lb)  01/27/17 : 86.6 kg (191 lb)     General: Well-developed white male no apparent distress. Neck: No jugular venous distention. Chest: Clear to auscultation. Heart: Irregular rhythm. No murmur or gallop audible. Extremities: 1+ edema. Unable to palpate dorsalis pedis or posterior tibial pulses. Skin: Erythema both lower extremities.     EKG: Atrial fibrillation. Nonspecific ST-T changes.  Cannot rule out anteroseptal myocardial infarction age-indeterminate.     IMPRESSION:   Atrial fibrillation, rapid ventricular response  Abnormal EKG consistent with a possible prior anteroseptal myocardial infarction  Peripheral vascular disease with claudication     PLAN:  The patient's discomfort in the lower extremities is getting worse. I believe he is having rest pain. He needs an operation, but his cardiac evaluation has been delayed. I need to look at an echocardiogram as soon as possible.     Increase metoprolol to 50 mg p.o. 3 times daily.        The diagnoses and plan were discussed with patient and caregiver. All questions answered. Plan of care agreed to by all concerned.  Tree Isabel. MD Zack      ADDENDUM dated 2/26/2017. The echocardiogram showed a depressed ejection fraction at 35-40%. He probably should have coronary arteriography prior to surgery. I have asked my office to contact him to have labs drawn. If his renal function is satisfactory, consider coronary arteriography. ,       Addendum:    Cath last week revealed severe two vessel disease amenable to stents. Intervention postponed due to discussion with vascular surgery for possible upcoming aortofem surgery for severe claudication. He now returns for multi vessel stent since vascular surgery is ok with doing open surgery on Plavix.      [facility]  3/15/17

## 2017-03-15 NOTE — IP AVS SNAPSHOT
Current Discharge Medication List  
  
START taking these medications Dose & Instructions Dispensing Information Comments Morning Noon Evening Bedtime  
 clopidogrel 75 mg Tab Commonly known as:  PLAVIX Your last dose was: Your next dose is:    
   
   
 Dose:  75 mg Take 1 Tab by mouth daily. Quantity:  30 Tab Refills:  11 CONTINUE these medications which have NOT CHANGED Dose & Instructions Dispensing Information Comments Morning Noon Evening Bedtime  
 amLODIPine 10 mg tablet Commonly known as:  Larry Ray Your last dose was: Your next dose is:    
   
   
 Dose:  10 mg Take 1 Tab by mouth daily. Quantity:  90 Tab Refills:  3  
     
   
   
   
  
 aspirin 81 mg chewable tablet Your last dose was: Your next dose is:    
   
   
 Dose:  81 mg Take 81 mg by mouth daily. Refills:  0  
     
   
   
   
  
 avanafil 100 mg tablet Commonly known as:  ZKAUIXH Your last dose was: Your next dose is:    
   
   
 Dose:  100 mg Take 1 Tab by mouth as needed for Other. Take 1 tab 15-30 mins prior to sexual acitivity Quantity:  12 Tab Refills:  0  
     
   
   
   
  
 cyanocobalamin 1,000 mcg tablet Commonly known as:  VITAMIN B-12 Your last dose was: Your next dose is:    
   
   
 Dose:  1000 mcg Take 1 Tab by mouth daily. Quantity:  90 Tab Refills:  3 DIGITEK 0.25 mg tablet Generic drug:  digoxin Your last dose was: Your next dose is:    
   
   
 take 1 tablet by mouth twice a day then 1 tablet by mouth once daily Refills:  0 DULoxetine 60 mg capsule Commonly known as:  CYMBALTA Your last dose was: Your next dose is:    
   
   
 Dose:  60 mg Take 1 Cap by mouth daily. Quantity:  30 Cap Refills:  0  
     
   
   
   
  
 hydroCHLOROthiazide 25 mg tablet Commonly known as:  HYDRODIURIL Your last dose was: Your next dose is:    
   
   
 Dose:  25 mg Take 1 Tab by mouth daily. Quantity:  90 Tab Refills:  3  
     
   
   
   
  
 losartan 100 mg tablet Commonly known as:  COZAAR Your last dose was: Your next dose is:    
   
   
 Dose:  100 mg Take 1 Tab by mouth daily. Quantity:  90 Tab Refills:  3  
     
   
   
   
  
 metoprolol tartrate 50 mg tablet Commonly known as:  LOPRESSOR Your last dose was: Your next dose is:    
   
   
 Dose:  50 mg Take 1 Tab by mouth three (3) times daily. Quantity:  60 Tab Refills:  3  
     
   
   
   
  
 oxyCODONE-acetaminophen 5-325 mg per tablet Commonly known as:  PERCOCET Your last dose was: Your next dose is:    
   
   
 Dose:  1 Tab Take 1 Tab by mouth every four (4) hours as needed for Pain. Max Daily Amount: 6 Tabs. Quantity:  80 Tab Refills:  0 POTASSIUM PHOSPHATE PO Your last dose was: Your next dose is: Take  by mouth. Take as directed Refills:  0 Where to Get Your Medications Information on where to get these meds will be given to you by the nurse or doctor. ! Ask your nurse or doctor about these medications  
  clopidogrel 75 mg Tab

## 2017-03-15 NOTE — PROGRESS NOTES
Pt Mews score high  Pt pulse 121   Pt in bed resting quietly   BP med administered to pt   Will continue to monitor pt

## 2017-03-15 NOTE — PROGRESS NOTES
Pt hourly rounding competed. Safety   Pt (x) resting on stretcher with side rails up and call bell in reach. () in chair    () in parents arms. Toileting   Pt offered ()Bedpan     ()Assistance to Restroom     (x)Urinal  Ongoing Updates  Updated on plan of care and status of test results.   Pain Management  Inquired as to comfort and offered comfort measures:    (x) warm blankets   (x) dimmed lights

## 2017-03-15 NOTE — IP AVS SNAPSHOT
Ely Kee 
 
 
 [address] [facility] [address] 
[phone] Patient: Joceline Landa MRN: [id number] [id number] You are allergic to the following No active allergies Recent Documentation Height Weight BMI Smoking Status 1.778 m 77.4 kg 24.48 kg/m2 Former Smoker Emergency Contacts Name Discharge Info Relation Home Work Mobile Lynnette Caro DISCHARGE CAREGIVER [3] Child [2] [phone] About your hospitalization You were admitted on:  March 15, 2017 You last received care in the:  [facility] 2 CV STEPDOWN You were discharged on:  March 16, 2017 Unit phone number:  [phone] Why you were hospitalized Your primary diagnosis was:  Not on File Your diagnoses also included:  Cad (Coronary Artery Disease) Providers Seen During Your Hospitalizations Provider Role Specialty Primary office phone Marlene Miranda MD Attending Provider Cardiology [phone] Your Primary Care Physician (PCP) Primary Care Physician Office Phone Office Fax Sterling Reddy [phone] Follow-up Information Follow up With Details Comments Contact Info Linette Carl MD On 3/17/2017 Appointment Time @11:00am [address] [facility] [address] [facility] 
[phone] Shirley Lai MD On 4/13/2017 Appointment Time @9:40am [address] [place] [address] [address] [facility] 
[phone] Your Appointments Friday March 17, 2017 11:00 AM EDT ROUTINE CARE with Linette Carl MD  
[facility] [address] [facility] [address] [facility]  
[phone] Monday March 27, 2017 12:45 PM EDT PROCEDURE with [facility]  
[facility] ([facility]) [address] [address]  
[phone]  Thursday April 06, 2017  9:00 AM EDT  
 Follow Up with [facility]  
[facility] ([facility] [place]) [address] [facility] [address] Thursday April 13, 2017  9:40 AM EDT  
POST HOSPITAL with Rupinder Marx MD  
[facility] ([facility]) [place] [address] [address] [address] [phone] Current Discharge Medication List  
  
START taking these medications Dose & Instructions Dispensing Information Comments Morning Noon Evening Bedtime  
 clopidogrel 75 mg Tab Commonly known as:  PLAVIX Your last dose was: Your next dose is:    
   
   
 Dose:  75 mg Take 1 Tab by mouth daily. Quantity:  30 Tab Refills:  11 CONTINUE these medications which have NOT CHANGED Dose & Instructions Dispensing Information Comments Morning Noon Evening Bedtime  
 amLODIPine 10 mg tablet Commonly known as:  Galilea Taylor Your last dose was: Your next dose is:    
   
   
 Dose:  10 mg Take 1 Tab by mouth daily. Quantity:  90 Tab Refills:  3  
     
   
   
   
  
 aspirin 81 mg chewable tablet Your last dose was: Your next dose is:    
   
   
 Dose:  81 mg Take 81 mg by mouth daily. Refills:  0  
     
   
   
   
  
 avanafil 100 mg tablet Commonly known as:  UWYAIZB Your last dose was: Your next dose is:    
   
   
 Dose:  100 mg Take 1 Tab by mouth as needed for Other. Take 1 tab 15-30 mins prior to sexual acitivity Quantity:  12 Tab Refills:  0  
     
   
   
   
  
 cyanocobalamin 1,000 mcg tablet Commonly known as:  VITAMIN B-12 Your last dose was: Your next dose is:    
   
   
 Dose:  1000 mcg Take 1 Tab by mouth daily. Quantity:  90 Tab Refills:  3 DIGITEK 0.25 mg tablet Generic drug:  digoxin Your last dose was: Your next dose is:    
   
   
 take 1 tablet by mouth twice a day then 1 tablet by mouth once daily Refills:  0 DULoxetine 60 mg capsule Commonly known as:  CYMBALTA Your last dose was: Your next dose is:    
   
   
 Dose:  60 mg Take 1 Cap by mouth daily. Quantity:  30 Cap Refills:  0  
     
   
   
   
  
 hydroCHLOROthiazide 25 mg tablet Commonly known as:  HYDRODIURIL Your last dose was: Your next dose is:    
   
   
 Dose:  25 mg Take 1 Tab by mouth daily. Quantity:  90 Tab Refills:  3  
     
   
   
   
  
 losartan 100 mg tablet Commonly known as:  COZAAR Your last dose was: Your next dose is:    
   
   
 Dose:  100 mg Take 1 Tab by mouth daily. Quantity:  90 Tab Refills:  3  
     
   
   
   
  
 metoprolol tartrate 50 mg tablet Commonly known as:  LOPRESSOR Your last dose was: Your next dose is:    
   
   
 Dose:  50 mg Take 1 Tab by mouth three (3) times daily. Quantity:  60 Tab Refills:  3  
     
   
   
   
  
 oxyCODONE-acetaminophen 5-325 mg per tablet Commonly known as:  PERCOCET Your last dose was: Your next dose is:    
   
   
 Dose:  1 Tab Take 1 Tab by mouth every four (4) hours as needed for Pain. Max Daily Amount: 6 Tabs. Quantity:  80 Tab Refills:  0 POTASSIUM PHOSPHATE PO Your last dose was: Your next dose is: Take  by mouth. Take as directed Refills:  0 Where to Get Your Medications Information on where to get these meds will be given to you by the nurse or doctor. ! Ask your nurse or doctor about these medications  
  clopidogrel 75 mg Tab Discharge Instructions [facility] [employer] Activation Thank you for requesting access to Zenput. Please follow the instructions below to securely access and download your online medical record.  Zenput allows you to send messages to your doctor, view your test results, renew your prescriptions, schedule appointments, and more. How Do I Sign Up? 1. In your internet browser, go to www."[email or web address] 
2. Click on the First Time User? Click Here link in the Sign In box. You will be redirect to the New Member Sign Up page. 3. Enter your AtheroNova Access Code exactly as it appears below. You will not need to use this code after youve completed the sign-up process. If you do not sign up before the expiration date, you must request a new code. AtheroNova Access Code: [email or web address] Expires: 2017  1:44 PM (This is the date your AtheroNova access code will ) 4. Enter the last four digits of your Social Security Number (xxxx) and Date of Birth (mm/dd/yyyy) as indicated and click Submit. You will be taken to the next sign-up page. 5. Create a AtheroNova ID. This will be your AtheroNova login ID and cannot be changed, so think of one that is secure and easy to remember. 6. Create a AtheroNova password. You can change your password at any time. 7. Enter your Password Reset Question and Answer. This can be used at a later time if you forget your password. 8. Enter your e-mail address. You will receive e-mail notification when new information is available in [address] 9. Click Sign Up. You can now view and download portions of your medical record. 10. Click the Download Summary menu link to download a portable copy of your medical information. Additional Information If you have questions, please visit the Frequently Asked Questions section of the AtheroNova website at [email or web address] Remember, AtheroNova is NOT to be used for urgent needs. For medical emergencies, dial 911. Patient armband removed and shredded DISCHARGE SUMMARY from Nurse The following personal items are in your possession at time of discharge: 
 
Dental Appliances: None Visual Aid: None Home Medications: None Jewelry: None Clothing: None Other Valuables: None PATIENT INSTRUCTIONS: 
 
 
F-face looks uneven A-arms unable to move or move unevenly S-speech slurred or non-existent T-time-call 911 as soon as signs and symptoms begin-DO NOT go Back to bed or wait to see if you get better-TIME IS BRAIN. Warning Signs of HEART ATTACK Call 911 if you have these symptoms: 
? Chest discomfort. Most heart attacks involve discomfort in the center of the chest that lasts more than a few minutes, or that goes away and comes back. It can feel like uncomfortable pressure, squeezing, fullness, or pain. ? Discomfort in other areas of the upper body. Symptoms can include pain or discomfort in one or both arms, the back, neck, jaw, or stomach. ? Shortness of breath with or without chest discomfort. ? Other signs may include breaking out in a cold sweat, nausea, or lightheadedness. Don't wait more than five minutes to call [address]! Fast action can save your life. Calling 911 is almost always the fastest way to get lifesaving treatment. Emergency Medical Services staff can begin treatment when they arrive  up to an hour sooner than if someone gets to the hospital by car. The discharge information has been reviewed with the patient. The patient verbalized understanding. Discharge medications reviewed with the patient and appropriate educational materials and side effects teaching were provided. Learning About Coronary Artery Disease (CAD) What is coronary artery disease?  
 
Coronary artery disease (CAD) occurs when plaque builds up in the arteries that bring oxygen-rich blood to your heart. Plaque is a fatty substance made of cholesterol, calcium, and other substances in the blood. This process is called hardening of the arteries, or atherosclerosis. What happens when you have coronary artery disease? · Plaque may narrow the coronary arteries. Narrowed arteries cause poor blood flow. This can lead to angina symptoms such as chest pain or discomfort. If blood flow is completely blocked, you could have a heart attack. · You can slow CAD and reduce the risk of future problems by making changes in your lifestyle. These include quitting smoking and eating heart-healthy foods. · Treatments for CAD, along with changes in your lifestyle, can help you live a longer and healthier life. How can you prevent coronary artery disease? · Do not smoke. It may be the best thing you can do to prevent heart disease. If you need help quitting, talk to your doctor about stop-smoking programs and medicines. These can increase your chances of quitting for good. · Be active. Get at least 30 minutes of exercise on most days of the week. Walking is a good choice. You also may want to do other activities, such as running, swimming, cycling, or playing tennis or team sports. · Eat heart-healthy foods. Eat more fruits and vegetables and less foods that contain saturated and trans fats. Limit alcohol, sodium, and sweets. · Stay at a healthy weight. Lose weight if you need to. · Manage other health problems such as diabetes, high blood pressure, and high cholesterol. · Manage stress. Stress can hurt your heart. To keep stress low, talk about your problems and feelings. Don't keep your feelings hidden. · If you have talked about it with your doctor, take a low-dose aspirin every day. Aspirin can help certain people lower their risk of a heart attack or stroke.  But taking aspirin isn't right for everyone, because it can cause serious bleeding. Do not start taking daily aspirin unless your doctor knows about it. How is coronary artery disease treated? · Your doctor will suggest that you make lifestyle changes. For example, your doctor may ask you to eat healthy foods, quit smoking, lose extra weight, and be more active. · You will have to take medicines. · Your doctor may suggest a procedure to open narrowed or blocked arteries. This is called angioplasty. Or your doctor may suggest using healthy blood vessels to create detours around narrowed or blocked arteries. This is called bypass surgery. Follow-up care is a key part of your treatment and safety. Be sure to make and go to all appointments, and call your doctor if you are having problems. It's also a good idea to know your test results and keep a list of the medicines you take. Where can you learn more? Go to http://yuniel-lyndsay.info/. Enter (08) [phone] in the search box to learn more about \"Learning About Coronary Artery Disease (CAD). \" Current as of: January 27, 2016 Content Version: 11.1 © 9204-4941 [employer]. Care instructions adapted under license by [employer] (which disclaims liability or warranty for this information). If you have questions about a medical condition or this instruction, always ask your healthcare professional. [address] any warranty or liability for your use of this information. Discharge Instructions Attachments/References PCI (PERCUTANEOUS CORONARY INTERVENTION) : POST-OP (ENGLISH) Discharge Orders None [email or web address] Announcement We are excited to announce that we are making your provider's discharge notes available to you in [facility]. You will see these notes when they are completed and signed by the physician that discharged you from your recent hospital stay.   If you have any questions or concerns about any information you see in DealerTrack, please call the Health Information Department where you were seen or reach out to your Primary Care Provider for more information about your plan of care. Introducing [facility]! [employer] introduces DealerTrack patient portal. Now you can access parts of your medical record, email your doctor's office, and request medication refills online. 1. In your internet browser, go to [email or web address] 2. Click on the First Time User? Click Here link in the Sign In box. You will see the New Member Sign Up page. 3. Enter your DealerTrack Access Code exactly as it appears below. You will not need to use this code after youve completed the sign-up process. If you do not sign up before the expiration date, you must request a new code. · DealerTrack Access Code: [email or web address] Expires: 4/11/2017  1:44 PM 
 
4. Enter the last four digits of your Social Security Number (xxxx) and Date of Birth (mm/dd/yyyy) as indicated and click Submit. You will be taken to the next sign-up page. 5. Create a DealerTrack ID. This will be your DealerTrack login ID and cannot be changed, so think of one that is secure and easy to remember. 6. Create a DealerTrack password. You can change your password at any time. 7. Enter your Password Reset Question and Answer. This can be used at a later time if you forget your password. 8. Enter your e-mail address. You will receive e-mail notification when new information is available in [address] 9. Click Sign Up. You can now view and download portions of your medical record. 10. Click the Download Summary menu link to download a portable copy of your medical information. If you have questions, please visit the Frequently Asked Questions section of the DealerTrack website. Remember, DealerTrack is NOT to be used for urgent needs. For medical emergencies, dial 911. Now available from your iPhone and Android! General Information Please provide this summary of care documentation to your next provider. Patient Signature:  ____________________________________________________________ Date:  ____________________________________________________________  
  
Inga Morales Provider Signature:  ____________________________________________________________ Date:  ____________________________________________________________ More Information Percutaneous Coronary Intervention: What to Expect at [facility] Your Recovery Percutaneous coronary intervention (PCI) is the name for procedures that are used to open a narrowed or blocked coronary artery. The two most common PCI procedures are coronary angioplasty and coronary stent placement. Your groin or arm may have a bruise and feel sore for a day or two after a percutaneous coronary intervention (PCI). You can do light activities around the house, but nothing strenuous for several days. This care sheet gives you a general idea about how long it will take for you to recover. But each person recovers at a different pace. Follow the steps below to get better as quickly as possible. How can you care for yourself at home? Activity · Do not do strenuous exercise and do not lift, pull, or push anything heavy until your doctor says it is okay. This may be for a day or two. You can walk around the house and do light activity, such as cooking. · You may shower 24 to 48 hours after the procedure, if your doctor okays it. Pat the incision dry. Do not take a bath for 1 week, or until your doctor tells you it is okay. · If the catheter was placed in your groin, try not to walk up stairs for the first couple of days. · If the catheter was placed in your arm near your wrist, do not bend your wrist deeply for the first couple of days. Be careful using your hand to get into and out of a chair or bed. · If your doctor recommends it, get more exercise.  Walking is a good choice. Bit by bit, increase the amount you walk every day. Try for at least 30 minutes on most days of the week. Diet · Drink plenty of fluids to help your body flush out the dye. If you have kidney, heart, or liver disease and have to limit fluids, talk with your doctor before you increase the amount of fluids you drink. · Keep eating a heart-healthy diet that has lots of fruits, vegetables, and whole grains. If you have not been eating this way, talk to your doctor. You also may want to talk to a dietitian. This expert can help you to learn about healthy foods and plan meals. Medicines · Your doctor will tell you if and when you can restart your medicines. He or she will also give you instructions about taking any new medicines. · If you take blood thinners, such as warfarin (Coumadin), clopidogrel (Plavix), or aspirin, be sure to talk to your doctor. He or she will tell you if and when to start taking those medicines again. Make sure that you understand exactly what your doctor wants you to do. · Your doctor will prescribe blood-thinning medicines. You will likely take aspirin plus another antiplatelet, such as clopidogrel (Plavix). It is very important that you take these medicines exactly as directed. These medicines help keep the coronary artery open and reduce your risk of a heart attack. · Call your doctor if you think you are having a problem with your medicine. Care of the catheter site · For 1 or 2 days, keep a bandage over the spot where the catheter was inserted. The bandage probably will fall off in this time. · Put ice or a cold pack on the area for 10 to 20 minutes at a time to help with soreness or swelling. Put a thin cloth between the ice and your skin. Follow-up care is a key part of your treatment and safety. Be sure to make and go to all appointments, and call your doctor if you are having problems.  It's also a good idea to know your test results and keep a list of the medicines you take. When should you call for help? Call 911 anytime you think you may need emergency care. For example, call if: 
· You passed out (lost consciousness). · You have severe trouble breathing. · You have sudden chest pain and shortness of breath, or you cough up blood. · You have symptoms of a heart attack, such as: ¨ Chest pain or pressure. ¨ Sweating. ¨ Shortness of breath. ¨ Nausea or vomiting. ¨ Pain that spreads from the chest to the neck, jaw, or one or both shoulders or arms. ¨ Dizziness or lightheadedness. ¨ A fast or uneven pulse. After calling 911, chew 1 adult-strength aspirin. Wait for an ambulance. Do not try to drive yourself. · You have been diagnosed with angina, and you have angina symptoms that do not go away with rest or are not getting better within 5 minutes after you take one dose of nitroglycerin. Call your doctor now or seek immediate medical care if: 
· You are bleeding from the area where the catheter was put in your artery. · You have a fast-growing, painful lump at the catheter site. · You have signs of infection, such as: 
¨ Increased pain, swelling, warmth, or redness. ¨ Red streaks leading from the catheter site. ¨ Pus draining from the catheter site. ¨ A fever. · Your leg or arm looks blue or feels cold, numb, or tingly. Watch closely for changes in your health, and be sure to contact your doctor if you have any problems. Where can you learn more? Go to http://yuniel-lyndsay.info/. Enter M893 in the search box to learn more about \"Percutaneous Coronary Intervention: What to Expect at Home. \" Current as of: January 27, 2016 Content Version: 11.1 © 0765-5096 [employer]. Care instructions adapted under license by [employer] (which disclaims liability or warranty for this information).  If you have questions about a medical condition or this instruction, always ask your healthcare professional. [place] [address] any warranty or liability for your use of this information.

## 2017-03-15 NOTE — PROGRESS NOTES
Cath holding summary    Patient escorted to cath holding from waiting area ambulatory, alert and oriented x 4, voicing no complaints. Changed into gown and placed on monitor. NPO since MN. Lab results, med rec and H&P reviewed on chart. PIV x 2 inserted without difficulty.

## 2017-03-15 NOTE — DISCHARGE SUMMARY
[facility]  -  Progress Note      Discharge Summary     Patient: Erling Spurling MRN: [id number]  SSN: [id number]    YOB: 1960  Age: 60 y.o. Sex: male       Admit Date: 3/15/2017    Discharge Date: 3/15/2017      Admission Diagnoses: Cardiomyopathy, unspecified [I42.9]    Discharge Diagnoses:   Problem List as of 3/15/2017  Date Reviewed: 2/27/2017          Codes Class Noted - Resolved    CAD (coronary artery disease) ICD-10-CM: I25.10  ICD-9-CM: 414.00  3/15/2017 - Present    Overview Signed 3/15/2017  2:19 PM by JUNE Knowles     Successful stenting of Cx (Xience LESLEY) and RCA (Xience LESLEY) to 0% by Dr. Jose Ramirez on 3/15/17.              Aortoiliac occlusive disease ([facility] ICD-10-CM: M74.81  ICD-9-CM: 444.09  1/25/2017 - Present        Atherosclerosis of native artery of both lower extremities with intermittent claudication ([facility] ICD-10-CM: D04.061  ICD-9-CM: 440.21  1/25/2017 - Present        PAD (peripheral artery disease) ([facility] ICD-10-CM: I73.9  ICD-9-CM: 443.9  1/25/2017 - Present        Spinal stenosis, lumbar ICD-10-CM: M48.06  ICD-9-CM: 724.02  12/13/2016 - Present        Aortic valve stenosis ICD-10-CM: I35.0  ICD-9-CM: 424.1  12/13/2016 - Present        Hereditary and idiopathic neuropathy, unspecified ICD-10-CM: G60.9  ICD-9-CM: 356.9  12/13/2016 - Present        Radiculopathy, lumbar region ICD-10-CM: M54.16  ICD-9-CM: 724.4  12/13/2016 - Present        Lumbar neuritis ICD-10-CM: M54.16  ICD-9-CM: 724.4  11/15/2016 - Present        Spinal stenosis, lumbar region, without neurogenic claudication ICD-10-CM: M48.06  ICD-9-CM: 724.02  11/15/2016 - Present        Hereditary peripheral neuropathy ICD-10-CM: G60.9  ICD-9-CM: 356.0  11/15/2016 - Present        Erectile dysfunction ICD-10-CM: N52.9  ICD-9-CM: 607.84  7/5/2016 - Present        Hypertension ICD-10-CM: I10  ICD-9-CM: 401.9  1/26/2015 - Present        Vitamin B12 deficiency ICD-10-CM: E53.8  ICD-9-CM: 266.2 1/26/2015 - Present        Low back pain ICD-10-CM: M54.5  ICD-9-CM: 724.2  1/26/2015 - Present        DDD (degenerative disc disease), lumbar ICD-10-CM: M51.36  ICD-9-CM: 722.52  1/26/2015 - Present               Discharge Condition: stable    Hospital Course:  Pt presented for elective percutaneous coronary intervention of known two vessel disease (circumflex and right coronary artery lesions). Pt Had successful stenting to RCA and Cx with drug-eluting stents. Pt will be on DAPT with ASA and Plavix. Pt was observed overnight without complication. Wound checked: no hematoma, bleeding, or signs of infection. Stable for discharge this morning. Note: BUN/Cr slightly elevated post-cath. Would have BMP checked in 5 days. Consults: none    Significant Diagnostic Studies: Left heart catheterization, coronary angiography, left ventriculography and coronary artery stenting. Disposition: home    Discharge Medications:   Current Discharge Medication List      START taking these medications    Details   clopidogrel (PLAVIX) 75 mg tab Take 1 Tab by mouth daily. Qty: 30 Tab, Refills: 11         CONTINUE these medications which have NOT CHANGED    Details   DIGITEK 250 mcg tablet take 1 tablet by mouth twice a day then 1 tablet by mouth once daily  Refills: 0      oxyCODONE-acetaminophen (PERCOCET) 5-325 mg per tablet Take 1 Tab by mouth every four (4) hours as needed for Pain. Max Daily Amount: 6 Tabs. Qty: 80 Tab, Refills: 0      DULoxetine (CYMBALTA) 60 mg capsule Take 1 Cap by mouth daily. Qty: 30 Cap, Refills: 0      aspirin 81 mg chewable tablet Take 81 mg by mouth daily. amLODIPine (NORVASC) 10 mg tablet Take 1 Tab by mouth daily. Qty: 90 Tab, Refills: 3      cyanocobalamin (VITAMIN B-12) 1,000 mcg tablet Take 1 Tab by mouth daily. Qty: 90 Tab, Refills: 3      hydrochlorothiazide (HYDRODIURIL) 25 mg tablet Take 1 Tab by mouth daily.   Qty: 90 Tab, Refills: 3      POTASSIUM PHOS,M-BASIC-D-BASIC (POTASSIUM PHOSPHATE PO) Take  by mouth.      metoprolol tartrate (LOPRESSOR) 50 mg tablet Take 1 Tab by mouth three (3) times daily. Qty: 60 Tab, Refills: 3      losartan (COZAAR) 100 mg tablet Take 1 Tab by mouth daily. Qty: 90 Tab, Refills: 3      avanafil (STENDRA) 100 mg tablet Take 1 Tab by mouth as needed for Other.  Take 1 tab 15-30 mins prior to sexual acitivity  Qty: 12 Tab, Refills: 0             Activity: as directed  Diet: cardiac  Wound Care: as directed    Follow-up Appointments   Procedures    FOLLOW UP VISIT Appointment in: One Month 4 week follow up with primary cardiologist.     4 week follow up with primary cardiologist.     Standing Status:   Standing     Number of Occurrences:   1     Order Specific Question:   Appointment in     Answer:   One Month       Signed By: Pelon Rosas     March 15, 2017

## 2017-03-16 ENCOUNTER — TELEPHONE (OUTPATIENT)
Dept: CARDIAC REHAB | Age: 57
End: 2017-03-16

## 2017-03-16 VITALS
SYSTOLIC BLOOD PRESSURE: 119 MMHG | DIASTOLIC BLOOD PRESSURE: 81 MMHG | HEIGHT: 70 IN | OXYGEN SATURATION: 99 % | BODY MASS INDEX: 24.42 KG/M2 | HEART RATE: 97 BPM | RESPIRATION RATE: 18 BRPM | TEMPERATURE: 97.8 F | WEIGHT: 170.6 LBS

## 2017-03-16 LAB
ATRIAL RATE: 81 BPM
CALCULATED R AXIS, ECG10: 1 DEGREES
CALCULATED T AXIS, ECG11: -72 DEGREES
DIAGNOSIS, 93000: NORMAL
Q-T INTERVAL, ECG07: 336 MS
QRS DURATION, ECG06: 90 MS
QTC CALCULATION (BEZET), ECG08: 467 MS
VENTRICULAR RATE, ECG03: 116 BPM

## 2017-03-16 PROCEDURE — 93005 ELECTROCARDIOGRAM TRACING: CPT

## 2017-03-16 PROCEDURE — 74011250637 HC RX REV CODE- 250/637: Performed by: INTERNAL MEDICINE

## 2017-03-16 PROCEDURE — 99218 HC RM OBSERVATION: CPT

## 2017-03-16 RX ADMIN — CLOPIDOGREL BISULFATE 75 MG: 75 TABLET, FILM COATED ORAL at 09:03

## 2017-03-16 RX ADMIN — AMLODIPINE BESYLATE 10 MG: 10 TABLET ORAL at 09:04

## 2017-03-16 RX ADMIN — ASPIRIN 81 MG: 81 TABLET, COATED ORAL at 09:04

## 2017-03-16 RX ADMIN — METOPROLOL TARTRATE 50 MG: 50 TABLET ORAL at 09:52

## 2017-03-16 RX ADMIN — HYDROCHLOROTHIAZIDE 25 MG: 25 TABLET ORAL at 09:52

## 2017-03-16 RX ADMIN — DIGOXIN 0.25 MG: 0.25 TABLET ORAL at 09:02

## 2017-03-16 RX ADMIN — Medication 10 ML: at 05:46

## 2017-03-16 RX ADMIN — CYANOCOBALAMIN TAB 1000 MCG 1000 MCG: 1000 TAB at 09:04

## 2017-03-16 RX ADMIN — OXYCODONE HYDROCHLORIDE AND ACETAMINOPHEN 1 TABLET: 5; 325 TABLET ORAL at 05:46

## 2017-03-16 RX ADMIN — DULOXETINE HYDROCHLORIDE 60 MG: 30 CAPSULE, DELAYED RELEASE ORAL at 09:04

## 2017-03-16 RX ADMIN — LOSARTAN POTASSIUM 100 MG: 25 TABLET ORAL at 09:02

## 2017-03-16 NOTE — PROGRESS NOTES
Pt with mew score of 4 due to elevated heart rate. MD aware of pt status of chronic afib with RVR. Will continue to monitor.      Patient Vitals for the past 4 hrs:   Temp Pulse Resp BP SpO2   03/15/17 2327 - (!) 102 18 - -   03/15/17 2323 97.7 °F (36.5 °C) (!) 132 20 129/69 95 %   03/15/17 1950 97.8 °F (36.6 °C) (!) 124 12 119/75 100 %

## 2017-03-16 NOTE — TELEPHONE ENCOUNTER
Missed patient before he was discharged home. Mailed patient information about the outpatient cardiac rehab program and healthy lifestyle in reference to exercise, managing stress and following a low fat/low cholesterol diet. He has been in for previous heart events before so is familiar with what he needs to do to take care of himself.

## 2017-03-16 NOTE — DISCHARGE INSTRUCTIONS
[employer] Activation    Thank you for requesting access to [employer]. Please follow the instructions below to securely access and download your online medical record. [employer] allows you to send messages to your doctor, view your test results, renew your prescriptions, schedule appointments, and more. How Do I Sign Up? 1. In your internet browser, go to [email or web address]  2. Click on the First Time User? Click Here link in the Sign In box. You will be redirect to the New Member Sign Up page. 3. Enter your [employer] Access Code exactly as it appears below. You will not need to use this code after youve completed the sign-up process. If you do not sign up before the expiration date, you must request a new code. [employer] Access Code: [email or web address]  Expires: 2017  1:44 PM (This is the date your [employer] access code will )    4. Enter the last four digits of your Social Security Number (xxxx) and Date of Birth (mm/dd/yyyy) as indicated and click Submit. You will be taken to the next sign-up page. 5. Create a [employer] ID. This will be your [employer] login ID and cannot be changed, so think of one that is secure and easy to remember. 6. Create a [employer] password. You can change your password at any time. 7. Enter your Password Reset Question and Answer. This can be used at a later time if you forget your password. 8. Enter your e-mail address. You will receive e-mail notification when new information is available in [address]. 9. Click Sign Up. You can now view and download portions of your medical record. 10. Click the Download Summary menu link to download a portable copy of your medical information. Additional Information    If you have questions, please visit the Frequently Asked Questions section of the [employer] website at [email or web address] Remember, [employer] is NOT to be used for urgent needs. For medical emergencies, dial 911.     Patient armband removed and shredded  DISCHARGE SUMMARY from Nurse    The following personal items are in your possession at time of discharge:    Dental Appliances: None  Visual Aid: None     Home Medications: None  Jewelry: None  Clothing: None  Other Valuables: None             PATIENT INSTRUCTIONS:    After general anesthesia or intravenous sedation, for 24 hours or while taking prescription Narcotics:  · Limit your activities  · Do not drive and operate hazardous machinery  · Do not make important personal or business decisions  · Do  not drink alcoholic beverages  · If you have not urinated within 8 hours after discharge, please contact your surgeon on call. Report the following to your surgeon:  · Excessive pain, swelling, redness or odor of or around the surgical area  · Temperature over 100.5  · Nausea and vomiting lasting longer than 4 hours or if unable to take medications  · Any signs of decreased circulation or nerve impairment to extremity: change in color, persistent  numbness, tingling, coldness or increase pain  · Any questions        What to do at Home:  Recommended activity: Activity as tolerated, as directed by physician    If you experience any of the following symptoms chest pain, shortness of breath, please follow up with emergency room. *  Please give a list of your current medications to your Primary Care Provider. *  Please update this list whenever your medications are discontinued, doses are      changed, or new medications (including over-the-counter products) are added. *  Please carry medication information at all times in case of emergency situations. These are general instructions for a healthy lifestyle:    No smoking/ No tobacco products/ Avoid exposure to second hand smoke    Surgeon General's Warning:  Quitting smoking now greatly reduces serious risk to your health.     Obesity, smoking, and sedentary lifestyle greatly increases your risk for illness    A healthy diet, regular physical exercise & weight monitoring are important for maintaining a healthy lifestyle    You may be retaining fluid if you have a history of heart failure or if you experience any of the following symptoms:  Weight gain of 3 pounds or more overnight or 5 pounds in a week, increased swelling in our hands or feet or shortness of breath while lying flat in bed. Please call your doctor as soon as you notice any of these symptoms; do not wait until your next office visit. Recognize signs and symptoms of STROKE:    F-face looks uneven    A-arms unable to move or move unevenly    S-speech slurred or non-existent    T-time-call 911 as soon as signs and symptoms begin-DO NOT go       Back to bed or wait to see if you get better-TIME IS BRAIN. Warning Signs of HEART ATTACK     Call 911 if you have these symptoms:   Chest discomfort. Most heart attacks involve discomfort in the center of the chest that lasts more than a few minutes, or that goes away and comes back. It can feel like uncomfortable pressure, squeezing, fullness, or pain.  Discomfort in other areas of the upper body. Symptoms can include pain or discomfort in one or both arms, the back, neck, jaw, or stomach.  Shortness of breath with or without chest discomfort.  Other signs may include breaking out in a cold sweat, nausea, or lightheadedness. Don't wait more than five minutes to call 911 - MINUTES MATTER! Fast action can save your life. Calling 911 is almost always the fastest way to get lifesaving treatment. Emergency Medical Services staff can begin treatment when they arrive -- up to an hour sooner than if someone gets to the hospital by car. The discharge information has been reviewed with the patient. The patient verbalized understanding. Discharge medications reviewed with the patient and appropriate educational materials and side effects teaching were provided.                Learning About Coronary Artery Disease (CAD)  What is coronary artery disease? Coronary artery disease (CAD) occurs when plaque builds up in the arteries that bring oxygen-rich blood to your heart. Plaque is a fatty substance made of cholesterol, calcium, and other substances in the blood. This process is called hardening of the arteries, or atherosclerosis. What happens when you have coronary artery disease? · Plaque may narrow the coronary arteries. Narrowed arteries cause poor blood flow. This can lead to angina symptoms such as chest pain or discomfort. If blood flow is completely blocked, you could have a heart attack. · You can slow CAD and reduce the risk of future problems by making changes in your lifestyle. These include quitting smoking and eating heart-healthy foods. · Treatments for CAD, along with changes in your lifestyle, can help you live a longer and healthier life. How can you prevent coronary artery disease? · Do not smoke. It may be the best thing you can do to prevent heart disease. If you need help quitting, talk to your doctor about stop-smoking programs and medicines. These can increase your chances of quitting for good. · Be active. Get at least 30 minutes of exercise on most days of the week. Walking is a good choice. You also may want to do other activities, such as running, swimming, cycling, or playing tennis or team sports. · Eat heart-healthy foods. Eat more fruits and vegetables and less foods that contain saturated and trans fats. Limit alcohol, sodium, and sweets. · Stay at a healthy weight. Lose weight if you need to. · Manage other health problems such as diabetes, high blood pressure, and high cholesterol. · Manage stress. Stress can hurt your heart. To keep stress low, talk about your problems and feelings. Don't keep your feelings hidden. · If you have talked about it with your doctor, take a low-dose aspirin every day. Aspirin can help certain people lower their risk of a heart attack or stroke.  But taking aspirin isn't right for everyone, because it can cause serious bleeding. Do not start taking daily aspirin unless your doctor knows about it. How is coronary artery disease treated? · Your doctor will suggest that you make lifestyle changes. For example, your doctor may ask you to eat healthy foods, quit smoking, lose extra weight, and be more active. · You will have to take medicines. · Your doctor may suggest a procedure to open narrowed or blocked arteries. This is called angioplasty. Or your doctor may suggest using healthy blood vessels to create detours around narrowed or blocked arteries. This is called bypass surgery. Follow-up care is a key part of your treatment and safety. Be sure to make and go to all appointments, and call your doctor if you are having problems. It's also a good idea to know your test results and keep a list of the medicines you take. Where can you learn more? Go to http://yuniel-lyndsay.info/. Enter (78) 9772 3882 in the search box to learn more about \"Learning About Coronary Artery Disease (CAD). \"  Current as of: January 27, 2016  Content Version: 11.1  © 8931-5510 [employer]. Care instructions adapted under license by [employer] (which disclaims liability or warranty for this information). If you have questions about a medical condition or this instruction, always ask your healthcare professional. [address] any warranty or liability for your use of this information.

## 2017-03-17 ENCOUNTER — OFFICE VISIT (OUTPATIENT)
Dept: FAMILY MEDICINE CLINIC | Age: 57
End: 2017-03-17

## 2017-03-17 ENCOUNTER — PATIENT OUTREACH (OUTPATIENT)
Dept: FAMILY MEDICINE CLINIC | Age: 57
End: 2017-03-17

## 2017-03-17 VITALS
BODY MASS INDEX: 24.34 KG/M2 | SYSTOLIC BLOOD PRESSURE: 102 MMHG | HEART RATE: 98 BPM | TEMPERATURE: 97 F | HEIGHT: 70 IN | OXYGEN SATURATION: 98 % | RESPIRATION RATE: 20 BRPM | WEIGHT: 170 LBS | DIASTOLIC BLOOD PRESSURE: 68 MMHG

## 2017-03-17 DIAGNOSIS — I70.213 ATHEROSCLEROSIS OF NATIVE ARTERY OF BOTH LOWER EXTREMITIES WITH INTERMITTENT CLAUDICATION (HCC): ICD-10-CM

## 2017-03-17 DIAGNOSIS — I74.09 AORTOILIAC OCCLUSIVE DISEASE (HCC): ICD-10-CM

## 2017-03-17 DIAGNOSIS — I10 ESSENTIAL HYPERTENSION: Primary | ICD-10-CM

## 2017-03-17 DIAGNOSIS — I73.9 PAD (PERIPHERAL ARTERY DISEASE) (HCC): ICD-10-CM

## 2017-03-17 RX ORDER — ATORVASTATIN CALCIUM 40 MG/1
40 TABLET, FILM COATED ORAL DAILY
Qty: 90 TAB | Refills: 0 | Status: SHIPPED | OUTPATIENT
Start: 2017-03-17 | End: 2017-04-29

## 2017-03-17 NOTE — PATIENT INSTRUCTIONS
Learning About Coronary Artery Disease (CAD)  What is coronary artery disease? Coronary artery disease (CAD) occurs when plaque builds up in the arteries that bring oxygen-rich blood to your heart. Plaque is a fatty substance made of cholesterol, calcium, and other substances in the blood. This process is called hardening of the arteries, or atherosclerosis. What happens when you have coronary artery disease? · Plaque may narrow the coronary arteries. Narrowed arteries cause poor blood flow. This can lead to angina symptoms such as chest pain or discomfort. If blood flow is completely blocked, you could have a heart attack. · You can slow CAD and reduce the risk of future problems by making changes in your lifestyle. These include quitting smoking and eating heart-healthy foods. · Treatments for CAD, along with changes in your lifestyle, can help you live a longer and healthier life. How can you prevent coronary artery disease? · Do not smoke. It may be the best thing you can do to prevent heart disease. If you need help quitting, talk to your doctor about stop-smoking programs and medicines. These can increase your chances of quitting for good. · Be active. Get at least 30 minutes of exercise on most days of the week. Walking is a good choice. You also may want to do other activities, such as running, swimming, cycling, or playing tennis or team sports. · Eat heart-healthy foods. Eat more fruits and vegetables and less foods that contain saturated and trans fats. Limit alcohol, sodium, and sweets. · Stay at a healthy weight. Lose weight if you need to. · Manage other health problems such as diabetes, high blood pressure, and high cholesterol. · Manage stress. Stress can hurt your heart. To keep stress low, talk about your problems and feelings. Don't keep your feelings hidden. · If you have talked about it with your doctor, take a low-dose aspirin every day.  Aspirin can help certain people lower their risk of a heart attack or stroke. But taking aspirin isn't right for everyone, because it can cause serious bleeding. Do not start taking daily aspirin unless your doctor knows about it. How is coronary artery disease treated? · Your doctor will suggest that you make lifestyle changes. For example, your doctor may ask you to eat healthy foods, quit smoking, lose extra weight, and be more active. · You will have to take medicines. · Your doctor may suggest a procedure to open narrowed or blocked arteries. This is called angioplasty. Or your doctor may suggest using healthy blood vessels to create detours around narrowed or blocked arteries. This is called bypass surgery. Follow-up care is a key part of your treatment and safety. Be sure to make and go to all appointments, and call your doctor if you are having problems. It's also a good idea to know your test results and keep a list of the medicines you take. Where can you learn more? Go to http://yuniel-lyndsay.info/. Enter ([phone] 4822 9328 in the search box to learn more about \"Learning About Coronary Artery Disease (CAD). \"  Current as of: January 27, 2016  Content Version: 11.1  © 7903-4314 [employer]. Care instructions adapted under license by [employer] (which disclaims liability or warranty for this information). If you have questions about a medical condition or this instruction, always ask your healthcare professional. [place] [address] any warranty or liability for your use of this information.

## 2017-03-17 NOTE — MR AVS SNAPSHOT
Visit Information Date & Time Provider Department Dept. Phone Encounter #  
 3/17/2017 11:00 AM Brittany Vicente, [address] [id number] Follow-up Instructions Return in about 3 months (around 6/17/2017), or if symptoms worsen or fail to improve. Your Appointments 3/27/2017 12:45 PM  
PROCEDURE with [facility] 2  
[facility] ([facility] SCHEDULING) Appt Note: cv 1mo michaels - may need auth [address] [address]  
[phone] [address] [place] [id number]  
  
    
 4/6/2017  9:00 AM  
Follow Up with JUNE Monteiro  
[facility] ([facility] [place]) Appt Note: follow up after studies on [address] [facility] [address] [address]  
[phone]  
  
   
 [address] [facility] [address] [place] [address] 4/13/2017  9:40 AM  
POST HOSPITAL with Fidelia Mark MD  
[facility] \[facility]\"" ([facility] Appt Note: sp stent [place] [address] [address] [address] [phone] [address] [place] [address] [address] Upcoming Health Maintenance Date Due COLONOSCOPY 7/23/2020 DTaP/Tdap/Td series (2 - Td) 7/5/2026 Allergies as of 3/17/2017  Review Complete On: 3/17/2017 By: Mirian Mauro LPN No Known Allergies Current Immunizations  Reviewed on 7/5/2016 Name Date Tdap 7/5/2016 Not reviewed this visit You Were Diagnosed With   
  
 Codes Comments Essential hypertension    -  Primary ICD-10-CM: I10 
ICD-9-CM: 401.9 Aortoiliac occlusive disease (HCC)     ICD-10-CM: I74.09 
ICD-9-CM: 444.09 Atherosclerosis of native artery of both lower extremities with intermittent claudication ([facility] [address]     ICD-10-CM: M19.335 ICD-9-CM: 440.21 PAD (peripheral artery disease) (HCC)     ICD-10-CM: I73.9 ICD-9-CM: 443.9 Vitals BP Pulse Temp Resp Height(growth percentile) Weight(growth percentile) 102/68 (BP 1 Location: Left arm, BP Patient Position: Sitting) 98 97 °F (36.1 °C) (Oral) 20 5' 10\" (1.778 m) 170 lb (77.1 kg) SpO2 BMI Smoking Status 98% 24.39 kg/m2 Former Smoker Vitals History BMI and BSA Data Body Mass Index Body Surface Area  
 24.39 kg/m 2 1.95 m 2 Preferred Pharmacy Pharmacy Name Phone [address] [facility], [address] Your Updated Medication List  
  
   
This list is accurate as of: 3/17/17 11:32 AM.  Always use your most recent med list. amLODIPine 10 mg tablet Commonly known as:  Maria Esther Tania Take 1 Tab by mouth daily. aspirin 81 mg chewable tablet Take 81 mg by mouth daily. atorvastatin 40 mg tablet Commonly known as:  LIPITOR Take 1 Tab by mouth daily. avanafil 100 mg tablet Commonly known as:  FPUMUKX Take 1 Tab by mouth as needed for Other. Take 1 tab 15-30 mins prior to sexual acitivity  
  
 clopidogrel 75 mg Tab Commonly known as:  PLAVIX Take 1 Tab by mouth daily. cyanocobalamin 1,000 mcg tablet Commonly known as:  VITAMIN B-12 Take 1 Tab by mouth daily. DIGITEK 0.25 mg tablet Generic drug:  digoxin  
take 1 tablet by mouth twice a day then 1 tablet by mouth once daily DULoxetine 60 mg capsule Commonly known as:  CYMBALTA Take 1 Cap by mouth daily. hydroCHLOROthiazide 25 mg tablet Commonly known as:  HYDRODIURIL Take 1 Tab by mouth daily. losartan 100 mg tablet Commonly known as:  COZAAR Take 1 Tab by mouth daily. metoprolol tartrate 50 mg tablet Commonly known as:  LOPRESSOR Take 1 Tab by mouth three (3) times daily. oxyCODONE-acetaminophen 5-325 mg per tablet Commonly known as:  PERCOCET Take 1 Tab by mouth every four (4) hours as needed for Pain. Max Daily Amount: 6 Tabs. POTASSIUM PHOSPHATE PO Take  by mouth. Take as directed Prescriptions Sent to Pharmacy Refills  
 atorvastatin (LIPITOR) 40 mg tablet 0 Sig: Take 1 Tab by mouth daily. Class: Normal  
 [id number] [place] [facility] #: [phone] Route: Oral  
  
Follow-up Instructions Return in about 3 months (around 6/17/2017), or if symptoms worsen or fail to improve. Patient Instructions Learning About Coronary Artery Disease (CAD) What is coronary artery disease? Coronary artery disease (CAD) occurs when plaque builds up in the arteries that bring oxygen-rich blood to your heart. Plaque is a fatty substance made of cholesterol, calcium, and other substances in the blood. This process is called hardening of the arteries, or atherosclerosis. What happens when you have coronary artery disease? · Plaque may narrow the coronary arteries. Narrowed arteries cause poor blood flow. This can lead to angina symptoms such as chest pain or discomfort. If blood flow is completely blocked, you could have a heart attack. · You can slow CAD and reduce the risk of future problems by making changes in your lifestyle. These include quitting smoking and eating heart-healthy foods. · Treatments for CAD, along with changes in your lifestyle, can help you live a longer and healthier life. How can you prevent coronary artery disease? · Do not smoke. It may be the best thing you can do to prevent heart disease. If you need help quitting, talk to your doctor about stop-smoking programs and medicines. These can increase your chances of quitting for good. · Be active. Get at least 30 minutes of exercise on most days of the week. Walking is a good choice. You also may want to do other activities, such as running, swimming, cycling, or playing tennis or team sports. · Eat heart-healthy foods.  Eat more fruits and vegetables and less foods that contain saturated and trans fats. Limit alcohol, sodium, and sweets. · Stay at a healthy weight. Lose weight if you need to. · Manage other health problems such as diabetes, high blood pressure, and high cholesterol. · Manage stress. Stress can hurt your heart. To keep stress low, talk about your problems and feelings. Don't keep your feelings hidden. · If you have talked about it with your doctor, take a low-dose aspirin every day. Aspirin can help certain people lower their risk of a heart attack or stroke. But taking aspirin isn't right for everyone, because it can cause serious bleeding. Do not start taking daily aspirin unless your doctor knows about it. How is coronary artery disease treated? · Your doctor will suggest that you make lifestyle changes. For example, your doctor may ask you to eat healthy foods, quit smoking, lose extra weight, and be more active. · You will have to take medicines. · Your doctor may suggest a procedure to open narrowed or blocked arteries. This is called angioplasty. Or your doctor may suggest using healthy blood vessels to create detours around narrowed or blocked arteries. This is called bypass surgery. Follow-up care is a key part of your treatment and safety. Be sure to make and go to all appointments, and call your doctor if you are having problems. It's also a good idea to know your test results and keep a list of the medicines you take. Where can you learn more? Go to http://yuniel-lyndsay.info/. Enter [phone] in the search box to learn more about \"Learning About Coronary Artery Disease (CAD). \" Current as of: January 27, 2016 Content Version: 11.1 © 5760-2122 [employer]. Care instructions adapted under license by [employer] (which disclaims liability or warranty for this information).  If you have questions about a medical condition or this instruction, always ask your healthcare professional. [employer] disclaims any warranty or liability for your use of this information. Introducing [facility]! Javier Pedraza introduces [employer] patient portal. Now you can access parts of your medical record, email your doctor's office, and request medication refills online. 1. In your internet browser, go to [email or web address] 2. Click on the First Time User? Click Here link in the Sign In box. You will see the New Member Sign Up page. 3. Enter your [employer] Access Code exactly as it appears below. You will not need to use this code after youve completed the sign-up process. If you do not sign up before the expiration date, you must request a new code. · [employer] Access Code: [email or web address] Expires: 4/11/2017  1:44 PM 
 
4. Enter the last four digits of your Social Security Number (xxxx) and Date of Birth (mm/dd/yyyy) as indicated and click Submit. You will be taken to the next sign-up page. 5. Create a [employer] ID. This will be your [employer] login ID and cannot be changed, so think of one that is secure and easy to remember. 6. Create a [employer] password. You can change your password at any time. 7. Enter your Password Reset Question and Answer. This can be used at a later time if you forget your password. 8. Enter your e-mail address. You will receive e-mail notification when new information is available in [address] 9. Click Sign Up. You can now view and download portions of your medical record. 10. Click the Download Summary menu link to download a portable copy of your medical information. If you have questions, please visit the Frequently Asked Questions section of the [employer] website. Remember, [employer] is NOT to be used for urgent needs. For medical emergencies, dial 911. Now available from your iPhone and Android! Please provide this summary of care documentation to your next provider. Your primary care clinician is listed as Artem Presley. If you have any questions after today's visit, please call [phone].

## 2017-03-17 NOTE — PROGRESS NOTES
Richelle Chapa, 60 y.o.,  male    SUBJECTIVE  Ff-up    Eventful past few months since last visit. Incidental finding of aortoiliac occlusion on MRI, developed bialteral leg claudication R>L, upon pre op clearance for bypass surgery, found to have afib RVR. And CAD s/p stenting. He denies cp, sob. He has increasing pain on legs. Has discussion with dr. Consuelo Jaime soon with plan. He is currently on plavix, ASA, BB. Reviewed discharge summary. ROS:  See HPI, all others negative        Patient Active Problem List   Diagnosis Code    Hypertension I10    Vitamin B12 deficiency E53.8    Low back pain M54.5    DDD (degenerative disc disease), lumbar M51.36    Erectile dysfunction N52.9    Lumbar neuritis M54.16    Spinal stenosis, lumbar region, without neurogenic claudication M48.06    Hereditary peripheral neuropathy G60.9    Spinal stenosis, lumbar M48.06    Aortic valve stenosis I35.0    Hereditary and idiopathic neuropathy, unspecified G60.9    Radiculopathy, lumbar region M54.16    Aortoiliac occlusive disease (HCC) I74.09    Atherosclerosis of native artery of both lower extremities with intermittent claudication (HCC) I70.213    PAD (peripheral artery disease) (HCC) I73.9    CAD (coronary artery disease) I25.10       Current Outpatient Prescriptions   Medication Sig Dispense Refill    atorvastatin (LIPITOR) 40 mg tablet Take 1 Tab by mouth daily. 90 Tab 0    clopidogrel (PLAVIX) 75 mg tab Take 1 Tab by mouth daily. 30 Tab 11    oxyCODONE-acetaminophen (PERCOCET) 5-325 mg per tablet Take 1 Tab by mouth every four (4) hours as needed for Pain. Max Daily Amount: 6 Tabs. 80 Tab 0    metoprolol tartrate (LOPRESSOR) 50 mg tablet Take 1 Tab by mouth three (3) times daily. 60 Tab 3    DULoxetine (CYMBALTA) 60 mg capsule Take 1 Cap by mouth daily. 30 Cap 0    aspirin 81 mg chewable tablet Take 81 mg by mouth daily.  amLODIPine (NORVASC) 10 mg tablet Take 1 Tab by mouth daily.  90 Tab 3    cyanocobalamin (VITAMIN B-12) 1,000 mcg tablet Take 1 Tab by mouth daily. 90 Tab 3    hydrochlorothiazide (HYDRODIURIL) 25 mg tablet Take 1 Tab by mouth daily. 90 Tab 3    losartan (COZAAR) 100 mg tablet Take 1 Tab by mouth daily. 90 Tab 3    POTASSIUM PHOS,M-BASIC-D-BASIC (POTASSIUM PHOSPHATE PO) Take  by mouth. Take as directed      DIGITEK 250 mcg tablet take 1 tablet by mouth twice a day then 1 tablet by mouth once daily  0    avanafil (STENDRA) 100 mg tablet Take 1 Tab by mouth as needed for Other. Take 1 tab 15-30 mins prior to sexual acitivity 12 Tab 0       No Known Allergies    Past Medical History:   Diagnosis Date    A-fib ([facility] [address]     Abnormal EKG     Essential hypertension     Lumbar canal stenosis 05/04/2015    Dr. Mary Ponce PVD (peripheral vascular disease) [facility])        Social History     Social History    Marital status: LEGALLY      Spouse name: N/A    Number of children: N/A    Years of education: N/A     Occupational History    Not on file.      Social History Main Topics    Smoking status: Former Smoker    Smokeless tobacco: Never Used      Comment: quit in 2011    Alcohol use 1.2 oz/week     2 Cans of beer per week      Comment: 10 cans of beer a month    Drug use: Yes     Special: Marijuana      Comment: occasionally    Sexual activity: Yes     Partners: Female     Other Topics Concern    Not on file     Social History Narrative       Family History   Problem Relation Age of Onset    Heart Disease Father          OBJECTIVE    Physical Exam:     Visit Vitals    /68 (BP 1 Location: Left arm, BP Patient Position: Sitting)    Pulse 98    Temp 97 °F (36.1 °C) (Oral)    Resp 20    Ht 5' 10\" (1.778 m)    Wt 170 lb (77.1 kg)    SpO2 98%    BMI 24.39 kg/m2       General: alert, well-appearing, , in no apparent distress or pain  CVS: normal rate, regular rhythm, distinct S1 and S2  Lungs:clear to ausculation bilaterally, no crackles, wheezing or rhonchi noted  Extremities: bilateral feet cyanosis. Skin: warm, no lesions, rashes noted  Psych:  mood and affect normal        ASSESSMENT/PLAN  Dimitri Her was seen today for hospital follow up, hypertension, vitamin d deficiency and coronary artery disease. Diagnoses and all orders for this visit:    CAD s/p percutaneous angioplasty  Asymptomatic, on BB, Ace, plavix, CCV  Cont care with dr. Molly Cortez  Will add lipitor, and check CMP/lipid panel/A1c    Essential hypertension  Controlled, cont bb, ace, ccb, diuretic  Monitoring    Aortoiliac occlusive disease ([address]  Cont care with dr Kofi Flores, he is symptomatic, however recent cardiac stenting    Atherosclerosis of native artery of both lower extremities with intermittent claudication ([address]    PAD (peripheral artery disease) ([address]    Other orders  -     atorvastatin (LIPITOR) 40 mg tablet; Take 1 Tab by mouth daily. Follow-up Disposition:  Return in about 3 months (around 6/17/2017), or if symptoms worsen or fail to improve. Patient understands plan of care. Patient has provided input and agrees with goals.

## 2017-03-17 NOTE — PROGRESS NOTES
Chief Complaint   Patient presents with   [facility] Follow Up    Hypertension    Vitamin D Deficiency    Coronary Artery Disease     1. Have you been to the ER, urgent care clinic since your last visit? Hospitalized since your last visit? No    2. Have you seen or consulted any other health care providers outside of the [address] [place] [address] since your last visit? Include any pap smears or colon screening.  Yes Dr. Epimenio Libman, Dr. Joy Roldan, Dr. Arielle Whyte

## 2017-03-20 ENCOUNTER — PATIENT OUTREACH (OUTPATIENT)
Dept: FAMILY MEDICINE CLINIC | Age: 57
End: 2017-03-20

## 2017-03-20 NOTE — PROGRESS NOTES
Nurse Navigator attempted to contact patient post cardiac cath with stent placement at [facility] on 3-. Message left introducing myself, the purpose of the call and giving my contact information. Requested that patient call back at his/her earliest convenience.

## 2017-03-27 ENCOUNTER — PATIENT OUTREACH (OUTPATIENT)
Dept: FAMILY MEDICINE CLINIC | Age: 57
End: 2017-03-27

## 2017-03-27 ENCOUNTER — TELEPHONE (OUTPATIENT)
Dept: VASCULAR SURGERY | Age: 57
End: 2017-03-27

## 2017-03-27 ENCOUNTER — OFFICE VISIT (OUTPATIENT)
Dept: VASCULAR SURGERY | Age: 57
End: 2017-03-27

## 2017-03-27 DIAGNOSIS — I70.213 ATHEROSCLEROSIS OF NATIVE ARTERY OF BOTH LOWER EXTREMITIES WITH INTERMITTENT CLAUDICATION (HCC): ICD-10-CM

## 2017-03-27 DIAGNOSIS — I73.9 PAD (PERIPHERAL ARTERY DISEASE) (HCC): ICD-10-CM

## 2017-03-27 DIAGNOSIS — I74.09 AORTOILIAC OCCLUSIVE DISEASE (HCC): ICD-10-CM

## 2017-03-27 NOTE — PROCEDURES
[facility]   *** FINAL REPORT ***    Name: Darleen Vazquez  MRN: [id number]       Outpatient  : 22 Mar 1960  HIS Order #: [id number]  [address] [facility] Visit #: [id number]  Date: 27 Mar 2017    TYPE OF TEST: Cerebrovascular Duplex    REASON FOR TEST  Peripheral vascular dz NOS    Right Carotid:-             Proximal               Mid                 Distal  cm/s  Systolic  Diastolic  Systolic  Diastolic  Systolic  Diastolic  CCA:     36.9       8.0                            51.0       8.0  Bulb:  ECA:    118.0      21.0  ICA:      0.0       0.0        0.0       0.0  ICA/CCA:  0.0       0.0    ICA Stenosis: Occluded    Right Vertebral:-  Finding: Antegrade  Sys:       92.0  Maria D:       48.0    Right Subclavian: Normal    Left Carotid:-            Proximal                Mid                 Distal  cm/s  Systolic  Diastolic  Systolic  Diastolic  Systolic  Diastolic  CCA:     21.6      21.0                            52.0      21.0  Bulb:    45.0      13.0  ECA:     50.0       9.0  ICA:     61.0      29.0       59.0      27.0       70.0      35.0  ICA/CCA:  0.8       1.7    ICA Stenosis: <50%    Left Vertebral:-  Finding: Not visualized  Sys:  Maria D:    Left Subclavian: Normal    INTERPRETATION/FINDINGS  Duplex images were obtained using 2-D gray scale, color flow and  spectral doppler analysis. 1. Probable occlusion of the right internal carotid artery, cannot  exclude extremely low flow. 2. Mild plaquing of the left internal carotid artery in the less than  50% range. 3. No significant stenosis in the external carotid arteries  bilaterally. 4. Antegrade flow in the right vertebral artery. 5. Unable to visualize the left vertebral artery, possible occlusion. 6. Bilateral subclavian arteries patent without significant stenosis. Triphasic signals noted throughout. Plaque Morphology:     Heterogeneous plaque in the bulb and left ICA.     ADDITIONAL COMMENTS  RT SCA: Prx 101 cm/sec     Mid 57 cm/sec     Dst 64 cm/sec  LT SCA: Prx 93 cm/sec       Mid 114 cm/sec    Dst 67 cm/sec    I have personally reviewed the data relevant to the interpretation of  this  study. TECHNOLOGIST: Alfred Weller RVT, BS  Signed: 03/27/2017 01:44 PM    PHYSICIAN: Cristian Monroy D.O.   Signed: 03/28/2017 09:19 AM

## 2017-03-27 NOTE — TELEPHONE ENCOUNTER
Daughter called and patient states he is intense pain, daughter wanted to see brando sooner, but advised that the appt he has on Wednesday is the earliest since he has to see brando to plan his surgery. Advised the daughter that if the patient can't stand the pain then go to the ED . Daughter states he probably won't want to do that but she would try.

## 2017-03-27 NOTE — PROGRESS NOTES
Nurse Navigator attempted to contact patient post cardiac cath with stent placement at [facility] on 3-. Message left introducing myself, the purpose of the call and giving my contact information. Requested that patient call back at his earliest convenience.

## 2017-03-29 ENCOUNTER — OFFICE VISIT (OUTPATIENT)
Dept: VASCULAR SURGERY | Age: 57
End: 2017-03-29

## 2017-03-29 VITALS
BODY MASS INDEX: 24.34 KG/M2 | RESPIRATION RATE: 18 BRPM | DIASTOLIC BLOOD PRESSURE: 70 MMHG | WEIGHT: 170 LBS | SYSTOLIC BLOOD PRESSURE: 100 MMHG | HEIGHT: 70 IN | HEART RATE: 98 BPM

## 2017-03-29 DIAGNOSIS — I73.9 PAD (PERIPHERAL ARTERY DISEASE) (HCC): ICD-10-CM

## 2017-03-29 DIAGNOSIS — I70.213 ATHEROSCLEROSIS OF NATIVE ARTERY OF BOTH LOWER EXTREMITIES WITH INTERMITTENT CLAUDICATION (HCC): ICD-10-CM

## 2017-03-29 DIAGNOSIS — I74.09 AORTOILIAC OCCLUSIVE DISEASE (HCC): ICD-10-CM

## 2017-03-29 NOTE — PROGRESS NOTES
Sunny Mi    Chief Complaint   Patient presents with    Carotid Artery Stenosis       History and Physical    Sunny Mi is a 59 y.o. male with known aortoiliac occlusive disease. Patient is in need of revascularization of his lower extremities as he does have rest pain of bilateral lower extremities. On workup he was found to have some coronary ischemia and was found to have multivessel disease. He ended up getting multiple drug-eluting stents. Because of this he is unable to get an aortobifemoral bypass. But he is still a candidate for a right axillary to bifemoral bypass. Patient was found to have an incidental finding of a right internal carotid artery which is occluded. He has no symptoms of TIA or strokelike symptoms. Currently continues to have a lot of rest pain bilateral lower extremities.     Past Medical History:   Diagnosis Date    A-fib [facility])     Abnormal EKG     CAD S/P percutaneous coronary angioplasty 03/2017    Essential hypertension     Lumbar canal stenosis 05/04/2015    Dr. Eliz Mccarthy PVD (peripheral vascular disease) [facility])      Past Surgical History:   Procedure Laterality Date    CARDIAC CATHETERIZATION  3/8/2017         CARDIAC CATHETERIZATION  3/15/2017         CORONARY ARTERY ANGIOGRAM  3/8/2017         CORONARY STENT EA ADDL VESSEL  3/15/2017         CORONARY STENT SINGLE W/PTCA  3/15/2017         HX COLONOSCOPY  07/23/2015    polyps repeat 2020 5 years [facility]    LV ANGIOGRAPHY  3/8/2017          Patient Active Problem List   Diagnosis Code    Hypertension I10    Vitamin B12 deficiency E53.8    Low back pain M54.5    DDD (degenerative disc disease), lumbar M51.36    Erectile dysfunction N52.9    Lumbar neuritis M54.16    Spinal stenosis, lumbar region, without neurogenic claudication M48.06    Hereditary peripheral neuropathy G60.9    Spinal stenosis, lumbar M48.06    Aortic valve stenosis I35.0    Hereditary and idiopathic neuropathy, unspecified G60.9    Radiculopathy, lumbar region M54.16    Aortoiliac occlusive disease ([facility]) I74.09    Atherosclerosis of native artery of both lower extremities with intermittent claudication ([facility]) I70.213    PAD (peripheral artery disease) ([facility]) I73.9    CAD (coronary artery disease) I25.10     Current Outpatient Prescriptions   Medication Sig Dispense Refill    atorvastatin (LIPITOR) 40 mg tablet Take 1 Tab by mouth daily. 90 Tab 0    clopidogrel (PLAVIX) 75 mg tab Take 1 Tab by mouth daily. 30 Tab 11    metoprolol tartrate (LOPRESSOR) 50 mg tablet Take 1 Tab by mouth three (3) times daily. 60 Tab 3    DULoxetine (CYMBALTA) 60 mg capsule Take 1 Cap by mouth daily. 30 Cap 0    aspirin 81 mg chewable tablet Take 81 mg by mouth daily.  amLODIPine (NORVASC) 10 mg tablet Take 1 Tab by mouth daily. 90 Tab 3    cyanocobalamin (VITAMIN B-12) 1,000 mcg tablet Take 1 Tab by mouth daily. 90 Tab 3    hydrochlorothiazide (HYDRODIURIL) 25 mg tablet Take 1 Tab by mouth daily. 90 Tab 3    losartan (COZAAR) 100 mg tablet Take 1 Tab by mouth daily. 90 Tab 3    POTASSIUM PHOS,M-BASIC-D-BASIC (POTASSIUM PHOSPHATE PO) Take  by mouth. Take as directed      DIGITEK 250 mcg tablet take 1 tablet by mouth twice a day then 1 tablet by mouth once daily  0    oxyCODONE-acetaminophen (PERCOCET) 5-325 mg per tablet Take 1 Tab by mouth every four (4) hours as needed for Pain. Max Daily Amount: 6 Tabs. 80 Tab 0    avanafil (STENDRA) 100 mg tablet Take 1 Tab by mouth as needed for Other. Take 1 tab 15-30 mins prior to sexual acitivity 12 Tab 0     No Known Allergies  Social History     Social History    Marital status: LEGALLY      Spouse name: N/A    Number of children: N/A    Years of education: N/A     Occupational History    Not on file.      Social History Main Topics    Smoking status: Former Smoker    Smokeless tobacco: Never Used      Comment: quit in 2011    Alcohol use 1.2 oz/week     2 Cans of beer per week Comment: 10 cans of beer a month    Drug use: Yes     Special: Marijuana      Comment: occasionally    Sexual activity: Yes     Partners: Female     Other Topics Concern    Not on file     Social History Narrative      Family History   Problem Relation Age of Onset    Heart Disease Father        Physical Exam:    Visit Vitals    /70 (BP 1 Location: Right arm, BP Patient Position: Sitting)    Pulse 98    Resp 18    Ht 5' 10\" (1.778 m)    Wt 170 lb (77.1 kg)    BMI 24.39 kg/m2      General: Well-appearing male in no acute distress  HEENT: EOMI no scleral icterus is noted  Pulmonary: No increase of work of breathing is noted  Extremities: Warm and perfused although no palpable distal pulses. Neuro: Cranial nerves II through XII grossly intact    Impression and Plan:  Aba Woods is a 59 y.o. male with aortoiliac occlusive disease in need of revascularization of his lower extremities given his rest pain. I recommended right axillary to bifemoral bypass with possible common femoral endarterectomy as necessary. Patient is already aware of all the risks and benefits of the procedure as we have gone over it in depth perfectly previously. We will have to perform the surgery on Plavix given his drug-eluting stents. I told him that this does increase his risk of bleeding but he does need a procedure that is going to last some long time and be able to work long-term for him. The right axillary to bifemoral bypass is that option. We will get him on the schedule as soon as we can. We reviewed the plan with the patient and the patient understands. We also gave the patient appropriate instructions on their disease process and when to call back. Follow-up Disposition: Not on [address] [place] [address], MD    PLEASE NOTE:  This document has been produced using voice recognition software. Unrecognized errors in transcription may be present.

## 2017-04-03 ENCOUNTER — ANESTHESIA EVENT (OUTPATIENT)
Dept: CARDIOTHORACIC SURGERY | Age: 57
DRG: 253 | End: 2017-04-03
Payer: COMMERCIAL

## 2017-04-04 ENCOUNTER — HOSPITAL ENCOUNTER (INPATIENT)
Age: 57
LOS: 17 days | Discharge: HOME HEALTH | DRG: 253 | End: 2017-04-21
Attending: SURGERY | Admitting: INTERNAL MEDICINE
Payer: COMMERCIAL

## 2017-04-04 ENCOUNTER — ANESTHESIA (OUTPATIENT)
Dept: CARDIOTHORACIC SURGERY | Age: 57
DRG: 253 | End: 2017-04-04
Payer: COMMERCIAL

## 2017-04-04 LAB
ABO + RH BLD: NORMAL
ACT BLD: 147 SECS (ref 79–138)
ANION GAP BLD CALC-SCNC: 12 MMOL/L (ref 3–18)
ARTERIAL PATENCY WRIST A: YES
BASE DEFICIT BLD-SCNC: 8 MMOL/L
BDY SITE: ABNORMAL
BLOOD GROUP ANTIBODIES SERPL: NORMAL
BUN BLD-MCNC: 29 MG/DL (ref 7–18)
BUN SERPL-MCNC: 29 MG/DL (ref 7–18)
BUN/CREAT SERPL: 8 (ref 12–20)
CA-I BLD-MCNC: 0.98 MMOL/L (ref 1.12–1.32)
CALCIUM SERPL-MCNC: 7.1 MG/DL (ref 8.5–10.1)
CHLORIDE BLD-SCNC: 83 MMOL/L (ref 100–108)
CHLORIDE SERPL-SCNC: 91 MMOL/L (ref 100–108)
CO2 SERPL-SCNC: 23 MMOL/L (ref 21–32)
CREAT SERPL-MCNC: 3.78 MG/DL (ref 0.6–1.3)
ERYTHROCYTE [DISTWIDTH] IN BLOOD BY AUTOMATED COUNT: 14 % (ref 11.6–14.5)
GAS FLOW.O2 O2 DELIVERY SYS: ABNORMAL L/MIN
GLUCOSE BLD STRIP.AUTO-MCNC: 112 MG/DL (ref 74–106)
GLUCOSE BLD STRIP.AUTO-MCNC: 158 MG/DL (ref 74–106)
GLUCOSE BLD STRIP.AUTO-MCNC: 176 MG/DL (ref 70–110)
GLUCOSE SERPL-MCNC: 128 MG/DL (ref 74–99)
HCO3 BLD-SCNC: 19.4 MMOL/L (ref 22–26)
HCT VFR BLD AUTO: 27.1 % (ref 36–48)
HCT VFR BLD CALC: 32 % (ref 36–49)
HCT VFR BLD CALC: 39 % (ref 36–49)
HGB BLD-MCNC: 10.9 G/DL (ref 12–16)
HGB BLD-MCNC: 13.3 G/DL (ref 12–16)
HGB BLD-MCNC: 9.8 G/DL (ref 13–16)
MCH RBC QN AUTO: 30.1 PG (ref 24–34)
MCHC RBC AUTO-ENTMCNC: 36.2 G/DL (ref 31–37)
MCV RBC AUTO: 83.1 FL (ref 74–97)
PCO2 BLD: 48.8 MMHG (ref 35–45)
PH BLD: 7.21 [PH] (ref 7.35–7.45)
PLATELET # BLD AUTO: 301 K/UL (ref 135–420)
PMV BLD AUTO: 9.4 FL (ref 9.2–11.8)
PO2 BLD: 162 MMHG (ref 80–100)
POTASSIUM BLD-SCNC: 3.2 MMOL/L (ref 3.5–5.5)
POTASSIUM BLD-SCNC: 3.6 MMOL/L (ref 3.5–5.5)
POTASSIUM SERPL-SCNC: 3.4 MMOL/L (ref 3.5–5.5)
RBC # BLD AUTO: 3.26 M/UL (ref 4.7–5.5)
SAO2 % BLD: 99 % (ref 92–97)
SERVICE CMNT-IMP: ABNORMAL
SODIUM BLD-SCNC: 123 MMOL/L (ref 136–145)
SODIUM BLD-SCNC: 124 MMOL/L (ref 136–145)
SODIUM SERPL-SCNC: 126 MMOL/L (ref 136–145)
SPECIMEN EXP DATE BLD: NORMAL
SPECIMEN TYPE: ABNORMAL
WBC # BLD AUTO: 17.5 K/UL (ref 4.6–13.2)

## 2017-04-04 PROCEDURE — 77030002986 HC SUT PROL J&J -A: Performed by: SURGERY

## 2017-04-04 PROCEDURE — 76010000200 HC CV SURG 4 TO 4.5 HR INTENSV-TIER 1: Performed by: SURGERY

## 2017-04-04 PROCEDURE — 85027 COMPLETE CBC AUTOMATED: CPT | Performed by: SURGERY

## 2017-04-04 PROCEDURE — 74011250637 HC RX REV CODE- 250/637: Performed by: NURSE ANESTHETIST, CERTIFIED REGISTERED

## 2017-04-04 PROCEDURE — C1768 GRAFT, VASCULAR: HCPCS | Performed by: SURGERY

## 2017-04-04 PROCEDURE — 77030018836 HC SOL IRR NACL ICUM -A: Performed by: SURGERY

## 2017-04-04 PROCEDURE — 77030018390 HC SPNG HEMSTAT2 J&J -B: Performed by: SURGERY

## 2017-04-04 PROCEDURE — 77030019908 HC STETH ESOPH SIMS -A: Performed by: ANESTHESIOLOGY

## 2017-04-04 PROCEDURE — 77030002924 HC SUT GORTX WLGO -B: Performed by: SURGERY

## 2017-04-04 PROCEDURE — 80048 BASIC METABOLIC PNL TOTAL CA: CPT | Performed by: SURGERY

## 2017-04-04 PROCEDURE — 74011000258 HC RX REV CODE- 258: Performed by: SURGERY

## 2017-04-04 PROCEDURE — 77030034850: Performed by: SURGERY

## 2017-04-04 PROCEDURE — 74011250636 HC RX REV CODE- 250/636: Performed by: NURSE ANESTHETIST, CERTIFIED REGISTERED

## 2017-04-04 PROCEDURE — 74011250636 HC RX REV CODE- 250/636

## 2017-04-04 PROCEDURE — 65620000000 HC RM CCU GENERAL

## 2017-04-04 PROCEDURE — 77030005401 HC CATH RAD ARRO -A: Performed by: ANESTHESIOLOGY

## 2017-04-04 PROCEDURE — 77030011640 HC PAD GRND REM COVD -A: Performed by: SURGERY

## 2017-04-04 PROCEDURE — 74011000258 HC RX REV CODE- 258

## 2017-04-04 PROCEDURE — 86900 BLOOD TYPING SEROLOGIC ABO: CPT | Performed by: SURGERY

## 2017-04-04 PROCEDURE — 74011000250 HC RX REV CODE- 250

## 2017-04-04 PROCEDURE — 82947 ASSAY GLUCOSE BLOOD QUANT: CPT

## 2017-04-04 PROCEDURE — 74011000250 HC RX REV CODE- 250: Performed by: SURGERY

## 2017-04-04 PROCEDURE — 82803 BLOOD GASES ANY COMBINATION: CPT

## 2017-04-04 PROCEDURE — 77030008683 HC TU ET CUF COVD -A: Performed by: ANESTHESIOLOGY

## 2017-04-04 PROCEDURE — 77010033678 HC OXYGEN DAILY

## 2017-04-04 PROCEDURE — 77030013079 HC BLNKT BAIR HGGR 3M -A: Performed by: ANESTHESIOLOGY

## 2017-04-04 PROCEDURE — 74011636637 HC RX REV CODE- 636/637: Performed by: ANESTHESIOLOGY

## 2017-04-04 PROCEDURE — 84295 ASSAY OF SERUM SODIUM: CPT

## 2017-04-04 PROCEDURE — 74011250637 HC RX REV CODE- 250/637: Performed by: SURGERY

## 2017-04-04 PROCEDURE — 76060000039 HC ANESTHESIA 4 TO 4.5 HR: Performed by: SURGERY

## 2017-04-04 PROCEDURE — 77030031139 HC SUT VCRL2 J&J -A: Performed by: SURGERY

## 2017-04-04 PROCEDURE — 74011250636 HC RX REV CODE- 250/636: Performed by: SURGERY

## 2017-04-04 PROCEDURE — 36600 WITHDRAWAL OF ARTERIAL BLOOD: CPT

## 2017-04-04 PROCEDURE — 77030002996 HC SUT SLK J&J -A: Performed by: SURGERY

## 2017-04-04 PROCEDURE — 85347 COAGULATION TIME ACTIVATED: CPT

## 2017-04-04 PROCEDURE — 82962 GLUCOSE BLOOD TEST: CPT

## 2017-04-04 PROCEDURE — 77030026918 HC ADMN ST IV BLD BD -A: Performed by: ANESTHESIOLOGY

## 2017-04-04 PROCEDURE — 77030010512 HC APPL CLP LIG J&J -C: Performed by: SURGERY

## 2017-04-04 DEVICE — GRAFT VASC L80CM DIA8MM PTFE CBAS HEP SURF THN WALLED REM: Type: IMPLANTABLE DEVICE | Site: AXILLA | Status: FUNCTIONAL

## 2017-04-04 RX ORDER — SUCCINYLCHOLINE CHLORIDE 20 MG/ML
INJECTION INTRAMUSCULAR; INTRAVENOUS AS NEEDED
Status: DISCONTINUED | OUTPATIENT
Start: 2017-04-04 | End: 2017-04-04 | Stop reason: HOSPADM

## 2017-04-04 RX ORDER — DEXAMETHASONE SODIUM PHOSPHATE 4 MG/ML
INJECTION, SOLUTION INTRA-ARTICULAR; INTRALESIONAL; INTRAMUSCULAR; INTRAVENOUS; SOFT TISSUE AS NEEDED
Status: DISCONTINUED | OUTPATIENT
Start: 2017-04-04 | End: 2017-04-04 | Stop reason: HOSPADM

## 2017-04-04 RX ORDER — DOCUSATE SODIUM 100 MG/1
100 CAPSULE, LIQUID FILLED ORAL 2 TIMES DAILY
Status: DISCONTINUED | OUTPATIENT
Start: 2017-04-04 | End: 2017-04-21 | Stop reason: HOSPADM

## 2017-04-04 RX ORDER — GLYCOPYRROLATE 0.2 MG/ML
INJECTION INTRAMUSCULAR; INTRAVENOUS AS NEEDED
Status: DISCONTINUED | OUTPATIENT
Start: 2017-04-04 | End: 2017-04-04 | Stop reason: HOSPADM

## 2017-04-04 RX ORDER — SODIUM CHLORIDE, SODIUM LACTATE, POTASSIUM CHLORIDE, CALCIUM CHLORIDE 600; 310; 30; 20 MG/100ML; MG/100ML; MG/100ML; MG/100ML
75 INJECTION, SOLUTION INTRAVENOUS CONTINUOUS
Status: DISCONTINUED | OUTPATIENT
Start: 2017-04-04 | End: 2017-04-04

## 2017-04-04 RX ORDER — HYDROMORPHONE HYDROCHLORIDE 2 MG/ML
0.5 INJECTION, SOLUTION INTRAMUSCULAR; INTRAVENOUS; SUBCUTANEOUS AS NEEDED
Status: COMPLETED | OUTPATIENT
Start: 2017-04-04 | End: 2017-04-10

## 2017-04-04 RX ORDER — ACETAMINOPHEN 325 MG/1
650 TABLET ORAL
Status: DISCONTINUED | OUTPATIENT
Start: 2017-04-04 | End: 2017-04-09

## 2017-04-04 RX ORDER — HYDROCHLOROTHIAZIDE 25 MG/1
25 TABLET ORAL DAILY
Status: DISCONTINUED | OUTPATIENT
Start: 2017-04-05 | End: 2017-04-05

## 2017-04-04 RX ORDER — IODIXANOL 320 MG/ML
INJECTION, SOLUTION INTRAVASCULAR
Status: DISCONTINUED
Start: 2017-04-04 | End: 2017-04-04

## 2017-04-04 RX ORDER — SODIUM CHLORIDE 0.9 % (FLUSH) 0.9 %
5-10 SYRINGE (ML) INJECTION EVERY 8 HOURS
Status: DISCONTINUED | OUTPATIENT
Start: 2017-04-04 | End: 2017-04-04 | Stop reason: HOSPADM

## 2017-04-04 RX ORDER — PHENYLEPHRINE HYDROCHLORIDE 10 MG/ML
INJECTION INTRAVENOUS
Status: DISCONTINUED
Start: 2017-04-04 | End: 2017-04-04

## 2017-04-04 RX ORDER — INSULIN LISPRO 100 [IU]/ML
INJECTION, SOLUTION INTRAVENOUS; SUBCUTANEOUS ONCE
Status: COMPLETED | OUTPATIENT
Start: 2017-04-04 | End: 2017-04-04

## 2017-04-04 RX ORDER — SODIUM CHLORIDE, SODIUM LACTATE, POTASSIUM CHLORIDE, CALCIUM CHLORIDE 600; 310; 30; 20 MG/100ML; MG/100ML; MG/100ML; MG/100ML
50 INJECTION, SOLUTION INTRAVENOUS CONTINUOUS
Status: DISCONTINUED | OUTPATIENT
Start: 2017-04-04 | End: 2017-04-04 | Stop reason: HOSPADM

## 2017-04-04 RX ORDER — LIDOCAINE HYDROCHLORIDE 10 MG/ML
0.1 INJECTION, SOLUTION EPIDURAL; INFILTRATION; INTRACAUDAL; PERINEURAL AS NEEDED
Status: DISCONTINUED | OUTPATIENT
Start: 2017-04-04 | End: 2017-04-04 | Stop reason: HOSPADM

## 2017-04-04 RX ORDER — SODIUM CHLORIDE 0.9 % (FLUSH) 0.9 %
5-10 SYRINGE (ML) INJECTION AS NEEDED
Status: DISCONTINUED | OUTPATIENT
Start: 2017-04-04 | End: 2017-04-04 | Stop reason: HOSPADM

## 2017-04-04 RX ORDER — AMLODIPINE BESYLATE 10 MG/1
10 TABLET ORAL DAILY
Status: DISCONTINUED | OUTPATIENT
Start: 2017-04-05 | End: 2017-04-05

## 2017-04-04 RX ORDER — NALOXONE HYDROCHLORIDE 0.4 MG/ML
0.4 INJECTION, SOLUTION INTRAMUSCULAR; INTRAVENOUS; SUBCUTANEOUS AS NEEDED
Status: DISCONTINUED | OUTPATIENT
Start: 2017-04-04 | End: 2017-04-21 | Stop reason: HOSPADM

## 2017-04-04 RX ORDER — METOPROLOL TARTRATE 50 MG/1
50 TABLET ORAL 3 TIMES DAILY
Status: DISCONTINUED | OUTPATIENT
Start: 2017-04-04 | End: 2017-04-04

## 2017-04-04 RX ORDER — CLOPIDOGREL BISULFATE 75 MG/1
75 TABLET ORAL DAILY
Status: DISCONTINUED | OUTPATIENT
Start: 2017-04-05 | End: 2017-04-21 | Stop reason: HOSPADM

## 2017-04-04 RX ORDER — ONDANSETRON 2 MG/ML
INJECTION INTRAMUSCULAR; INTRAVENOUS AS NEEDED
Status: DISCONTINUED | OUTPATIENT
Start: 2017-04-04 | End: 2017-04-04 | Stop reason: HOSPADM

## 2017-04-04 RX ORDER — ATORVASTATIN CALCIUM 40 MG/1
40 TABLET, FILM COATED ORAL DAILY
Status: DISCONTINUED | OUTPATIENT
Start: 2017-04-05 | End: 2017-04-21 | Stop reason: HOSPADM

## 2017-04-04 RX ORDER — OXYCODONE AND ACETAMINOPHEN 5; 325 MG/1; MG/1
1 TABLET ORAL
Status: DISCONTINUED | OUTPATIENT
Start: 2017-04-04 | End: 2017-04-09

## 2017-04-04 RX ORDER — MORPHINE SULFATE 2 MG/ML
2 INJECTION, SOLUTION INTRAMUSCULAR; INTRAVENOUS
Status: DISCONTINUED | OUTPATIENT
Start: 2017-04-04 | End: 2017-04-20

## 2017-04-04 RX ORDER — MAGNESIUM SULFATE 100 %
4 CRYSTALS MISCELLANEOUS AS NEEDED
Status: DISCONTINUED | OUTPATIENT
Start: 2017-04-04 | End: 2017-04-21 | Stop reason: HOSPADM

## 2017-04-04 RX ORDER — MIDAZOLAM HYDROCHLORIDE 1 MG/ML
INJECTION, SOLUTION INTRAMUSCULAR; INTRAVENOUS AS NEEDED
Status: DISCONTINUED | OUTPATIENT
Start: 2017-04-04 | End: 2017-04-04 | Stop reason: HOSPADM

## 2017-04-04 RX ORDER — HEPARIN SODIUM 5000 [USP'U]/ML
5000 INJECTION, SOLUTION INTRAVENOUS; SUBCUTANEOUS EVERY 8 HOURS
Status: DISCONTINUED | OUTPATIENT
Start: 2017-04-04 | End: 2017-04-09

## 2017-04-04 RX ORDER — DULOXETIN HYDROCHLORIDE 60 MG/1
60 CAPSULE, DELAYED RELEASE ORAL DAILY
Status: DISCONTINUED | OUTPATIENT
Start: 2017-04-05 | End: 2017-04-05

## 2017-04-04 RX ORDER — HYDROMORPHONE HYDROCHLORIDE 1 MG/ML
INJECTION, SOLUTION INTRAMUSCULAR; INTRAVENOUS; SUBCUTANEOUS AS NEEDED
Status: DISCONTINUED | OUTPATIENT
Start: 2017-04-04 | End: 2017-04-04 | Stop reason: HOSPADM

## 2017-04-04 RX ORDER — SODIUM CHLORIDE 9 MG/ML
INJECTION, SOLUTION INTRAVENOUS
Status: DISCONTINUED | OUTPATIENT
Start: 2017-04-04 | End: 2017-04-04 | Stop reason: HOSPADM

## 2017-04-04 RX ORDER — ROCURONIUM BROMIDE 10 MG/ML
INJECTION, SOLUTION INTRAVENOUS AS NEEDED
Status: DISCONTINUED | OUTPATIENT
Start: 2017-04-04 | End: 2017-04-04 | Stop reason: HOSPADM

## 2017-04-04 RX ORDER — SODIUM CHLORIDE 0.9 % (FLUSH) 0.9 %
5-10 SYRINGE (ML) INJECTION EVERY 8 HOURS
Status: DISCONTINUED | OUTPATIENT
Start: 2017-04-04 | End: 2017-04-21 | Stop reason: HOSPADM

## 2017-04-04 RX ORDER — PROTAMINE SULFATE 10 MG/ML
INJECTION, SOLUTION INTRAVENOUS AS NEEDED
Status: DISCONTINUED | OUTPATIENT
Start: 2017-04-04 | End: 2017-04-04 | Stop reason: HOSPADM

## 2017-04-04 RX ORDER — LIDOCAINE HYDROCHLORIDE 10 MG/ML
INJECTION, SOLUTION EPIDURAL; INFILTRATION; INTRACAUDAL; PERINEURAL
Status: DISCONTINUED
Start: 2017-04-04 | End: 2017-04-04

## 2017-04-04 RX ORDER — FAMOTIDINE 20 MG/1
20 TABLET, FILM COATED ORAL ONCE
Status: COMPLETED | OUTPATIENT
Start: 2017-04-04 | End: 2017-04-04

## 2017-04-04 RX ORDER — METOPROLOL TARTRATE 25 MG/1
25 TABLET, FILM COATED ORAL EVERY 8 HOURS
Status: DISCONTINUED | OUTPATIENT
Start: 2017-04-04 | End: 2017-04-08

## 2017-04-04 RX ORDER — METOPROLOL TARTRATE 25 MG/1
25 TABLET, FILM COATED ORAL 3 TIMES DAILY
Status: DISCONTINUED | OUTPATIENT
Start: 2017-04-04 | End: 2017-04-04

## 2017-04-04 RX ORDER — SODIUM CHLORIDE 0.9 % (FLUSH) 0.9 %
5-10 SYRINGE (ML) INJECTION AS NEEDED
Status: DISCONTINUED | OUTPATIENT
Start: 2017-04-04 | End: 2017-04-21 | Stop reason: HOSPADM

## 2017-04-04 RX ORDER — HEPARIN SODIUM 200 [USP'U]/100ML
INJECTION, SOLUTION INTRAVENOUS
Status: DISCONTINUED
Start: 2017-04-04 | End: 2017-04-04

## 2017-04-04 RX ORDER — CEFAZOLIN SODIUM 2 G/50ML
2 SOLUTION INTRAVENOUS EVERY 8 HOURS
Status: COMPLETED | OUTPATIENT
Start: 2017-04-04 | End: 2017-04-05

## 2017-04-04 RX ORDER — ETOMIDATE 2 MG/ML
INJECTION INTRAVENOUS AS NEEDED
Status: DISCONTINUED | OUTPATIENT
Start: 2017-04-04 | End: 2017-04-04 | Stop reason: HOSPADM

## 2017-04-04 RX ORDER — ONDANSETRON 2 MG/ML
4 INJECTION INTRAMUSCULAR; INTRAVENOUS
Status: DISCONTINUED | OUTPATIENT
Start: 2017-04-04 | End: 2017-04-21 | Stop reason: HOSPADM

## 2017-04-04 RX ORDER — ESMOLOL HYDROCHLORIDE 10 MG/ML
INJECTION INTRAVENOUS AS NEEDED
Status: DISCONTINUED | OUTPATIENT
Start: 2017-04-04 | End: 2017-04-04 | Stop reason: HOSPADM

## 2017-04-04 RX ORDER — LOSARTAN POTASSIUM 50 MG/1
100 TABLET ORAL DAILY
Status: DISCONTINUED | OUTPATIENT
Start: 2017-04-05 | End: 2017-04-05

## 2017-04-04 RX ORDER — METOPROLOL TARTRATE 5 MG/5ML
INJECTION INTRAVENOUS AS NEEDED
Status: DISCONTINUED | OUTPATIENT
Start: 2017-04-04 | End: 2017-04-04 | Stop reason: HOSPADM

## 2017-04-04 RX ORDER — DEXTROSE 50 % IN WATER (D50W) INTRAVENOUS SYRINGE
25-50 AS NEEDED
Status: DISCONTINUED | OUTPATIENT
Start: 2017-04-04 | End: 2017-04-21 | Stop reason: HOSPADM

## 2017-04-04 RX ORDER — GUAIFENESIN 100 MG/5ML
81 LIQUID (ML) ORAL DAILY
Status: DISCONTINUED | OUTPATIENT
Start: 2017-04-05 | End: 2017-04-13

## 2017-04-04 RX ORDER — NEOSTIGMINE METHYLSULFATE 5 MG/5 ML
SYRINGE (ML) INTRAVENOUS AS NEEDED
Status: DISCONTINUED | OUTPATIENT
Start: 2017-04-04 | End: 2017-04-04 | Stop reason: HOSPADM

## 2017-04-04 RX ORDER — FENTANYL CITRATE 50 UG/ML
50 INJECTION, SOLUTION INTRAMUSCULAR; INTRAVENOUS
Status: DISCONTINUED | OUTPATIENT
Start: 2017-04-04 | End: 2017-04-20

## 2017-04-04 RX ORDER — HEPARIN SODIUM 200 [USP'U]/100ML
INJECTION, SOLUTION INTRAVENOUS
Status: DISCONTINUED | OUTPATIENT
Start: 2017-04-04 | End: 2017-04-04 | Stop reason: HOSPADM

## 2017-04-04 RX ORDER — LANOLIN ALCOHOL/MO/W.PET/CERES
1000 CREAM (GRAM) TOPICAL DAILY
Status: DISCONTINUED | OUTPATIENT
Start: 2017-04-05 | End: 2017-04-21 | Stop reason: HOSPADM

## 2017-04-04 RX ORDER — DIGOXIN 250 MCG
0.25 TABLET ORAL DAILY
Status: DISCONTINUED | OUTPATIENT
Start: 2017-04-05 | End: 2017-04-05

## 2017-04-04 RX ORDER — PHENYLEPHRINE HYDROCHLORIDE 10 MG/ML
INJECTION INTRAVENOUS
Status: COMPLETED
Start: 2017-04-04 | End: 2017-04-04

## 2017-04-04 RX ORDER — DIPHENHYDRAMINE HYDROCHLORIDE 50 MG/ML
12.5 INJECTION, SOLUTION INTRAMUSCULAR; INTRAVENOUS
Status: DISCONTINUED | OUTPATIENT
Start: 2017-04-04 | End: 2017-04-21 | Stop reason: HOSPADM

## 2017-04-04 RX ORDER — SODIUM CHLORIDE, SODIUM LACTATE, POTASSIUM CHLORIDE, CALCIUM CHLORIDE 600; 310; 30; 20 MG/100ML; MG/100ML; MG/100ML; MG/100ML
100 INJECTION, SOLUTION INTRAVENOUS CONTINUOUS
Status: DISCONTINUED | OUTPATIENT
Start: 2017-04-04 | End: 2017-04-05

## 2017-04-04 RX ORDER — FENTANYL CITRATE 50 UG/ML
INJECTION, SOLUTION INTRAMUSCULAR; INTRAVENOUS AS NEEDED
Status: DISCONTINUED | OUTPATIENT
Start: 2017-04-04 | End: 2017-04-04 | Stop reason: HOSPADM

## 2017-04-04 RX ORDER — HEPARIN SODIUM 1000 [USP'U]/ML
INJECTION, SOLUTION INTRAVENOUS; SUBCUTANEOUS AS NEEDED
Status: DISCONTINUED | OUTPATIENT
Start: 2017-04-04 | End: 2017-04-04 | Stop reason: HOSPADM

## 2017-04-04 RX ADMIN — ESMOLOL HYDROCHLORIDE 20 MG: 10 INJECTION INTRAVENOUS at 09:17

## 2017-04-04 RX ADMIN — Medication 10 ML: at 23:01

## 2017-04-04 RX ADMIN — Medication 3 MG: at 11:58

## 2017-04-04 RX ADMIN — FENTANYL CITRATE 50 MCG: 50 INJECTION, SOLUTION INTRAMUSCULAR; INTRAVENOUS at 11:32

## 2017-04-04 RX ADMIN — METOPROLOL TARTRATE 25 MG: 25 TABLET ORAL at 21:10

## 2017-04-04 RX ADMIN — METOPROLOL TARTRATE 2 MG: 5 INJECTION INTRAVENOUS at 09:48

## 2017-04-04 RX ADMIN — PHENYLEPHRINE HYDROCHLORIDE 100 MCG: 10 INJECTION INTRAMUSCULAR; INTRAVENOUS; SUBCUTANEOUS at 08:56

## 2017-04-04 RX ADMIN — HEPARIN SODIUM 5000 UNITS: 5000 INJECTION, SOLUTION INTRAVENOUS; SUBCUTANEOUS at 21:10

## 2017-04-04 RX ADMIN — POTASSIUM CHLORIDE: 2 INJECTION, SOLUTION, CONCENTRATE INTRAVENOUS at 21:51

## 2017-04-04 RX ADMIN — Medication 10 ML: at 17:52

## 2017-04-04 RX ADMIN — METOPROLOL TARTRATE 1 MG: 5 INJECTION INTRAVENOUS at 11:45

## 2017-04-04 RX ADMIN — SODIUM CHLORIDE: 9 INJECTION, SOLUTION INTRAVENOUS at 08:51

## 2017-04-04 RX ADMIN — CEFAZOLIN SODIUM 2 G: 2 SOLUTION INTRAVENOUS at 19:34

## 2017-04-04 RX ADMIN — PHENYLEPHRINE HYDROCHLORIDE 100 MCG: 10 INJECTION INTRAMUSCULAR; INTRAVENOUS; SUBCUTANEOUS at 08:45

## 2017-04-04 RX ADMIN — ESMOLOL HYDROCHLORIDE 20 MG: 10 INJECTION INTRAVENOUS at 11:16

## 2017-04-04 RX ADMIN — HEPARIN SODIUM 5000 UNITS: 5000 INJECTION, SOLUTION INTRAVENOUS; SUBCUTANEOUS at 15:47

## 2017-04-04 RX ADMIN — SODIUM CHLORIDE, SODIUM LACTATE, POTASSIUM CHLORIDE, AND CALCIUM CHLORIDE: 600; 310; 30; 20 INJECTION, SOLUTION INTRAVENOUS at 10:15

## 2017-04-04 RX ADMIN — ESMOLOL HYDROCHLORIDE 20 MG: 10 INJECTION INTRAVENOUS at 09:56

## 2017-04-04 RX ADMIN — DEXAMETHASONE SODIUM PHOSPHATE 8 MG: 4 INJECTION, SOLUTION INTRA-ARTICULAR; INTRALESIONAL; INTRAMUSCULAR; INTRAVENOUS; SOFT TISSUE at 09:15

## 2017-04-04 RX ADMIN — INSULIN LISPRO 3 UNITS: 100 INJECTION, SOLUTION INTRAVENOUS; SUBCUTANEOUS at 17:51

## 2017-04-04 RX ADMIN — HYDROMORPHONE HYDROCHLORIDE 1 MG: 1 INJECTION, SOLUTION INTRAMUSCULAR; INTRAVENOUS; SUBCUTANEOUS at 09:06

## 2017-04-04 RX ADMIN — FENTANYL CITRATE 75 MCG: 50 INJECTION, SOLUTION INTRAMUSCULAR; INTRAVENOUS at 08:41

## 2017-04-04 RX ADMIN — GLYCOPYRROLATE 0.4 MG: 0.2 INJECTION INTRAMUSCULAR; INTRAVENOUS at 11:58

## 2017-04-04 RX ADMIN — ESMOLOL HYDROCHLORIDE 20 MG: 10 INJECTION INTRAVENOUS at 09:30

## 2017-04-04 RX ADMIN — SODIUM CHLORIDE, SODIUM LACTATE, POTASSIUM CHLORIDE, AND CALCIUM CHLORIDE 100 ML/HR: 600; 310; 30; 20 INJECTION, SOLUTION INTRAVENOUS at 17:51

## 2017-04-04 RX ADMIN — ROCURONIUM BROMIDE 20 MG: 10 INJECTION, SOLUTION INTRAVENOUS at 08:48

## 2017-04-04 RX ADMIN — ESMOLOL HYDROCHLORIDE 20 MG: 10 INJECTION INTRAVENOUS at 12:04

## 2017-04-04 RX ADMIN — ESMOLOL HYDROCHLORIDE 20 MG: 10 INJECTION INTRAVENOUS at 10:31

## 2017-04-04 RX ADMIN — Medication: at 10:00

## 2017-04-04 RX ADMIN — ESMOLOL HYDROCHLORIDE 20 MG: 10 INJECTION INTRAVENOUS at 09:47

## 2017-04-04 RX ADMIN — HEPARIN SODIUM 1000 UNITS: 1000 INJECTION, SOLUTION INTRAVENOUS; SUBCUTANEOUS at 10:39

## 2017-04-04 RX ADMIN — Medication 10 ML: at 07:37

## 2017-04-04 RX ADMIN — SODIUM CHLORIDE, SODIUM LACTATE, POTASSIUM CHLORIDE, AND CALCIUM CHLORIDE 100 ML/HR: 600; 310; 30; 20 INJECTION, SOLUTION INTRAVENOUS at 13:57

## 2017-04-04 RX ADMIN — FENTANYL CITRATE 75 MCG: 50 INJECTION, SOLUTION INTRAMUSCULAR; INTRAVENOUS at 08:34

## 2017-04-04 RX ADMIN — PROTAMINE SULFATE 25 MG: 10 INJECTION, SOLUTION INTRAVENOUS at 11:35

## 2017-04-04 RX ADMIN — PHENYLEPHRINE HYDROCHLORIDE 100 MCG: 10 INJECTION INTRAMUSCULAR; INTRAVENOUS; SUBCUTANEOUS at 08:51

## 2017-04-04 RX ADMIN — SODIUM CHLORIDE, SODIUM LACTATE, POTASSIUM CHLORIDE, AND CALCIUM CHLORIDE 50 ML/HR: 600; 310; 30; 20 INJECTION, SOLUTION INTRAVENOUS at 07:37

## 2017-04-04 RX ADMIN — METOPROLOL TARTRATE 2 MG: 5 INJECTION INTRAVENOUS at 08:53

## 2017-04-04 RX ADMIN — HEPARIN SODIUM 6000 UNITS: 1000 INJECTION, SOLUTION INTRAVENOUS; SUBCUTANEOUS at 09:42

## 2017-04-04 RX ADMIN — ONDANSETRON 4 MG: 2 INJECTION INTRAMUSCULAR; INTRAVENOUS at 09:00

## 2017-04-04 RX ADMIN — PHENYLEPHRINE HYDROCHLORIDE 100 MCG: 10 INJECTION INTRAMUSCULAR; INTRAVENOUS; SUBCUTANEOUS at 08:34

## 2017-04-04 RX ADMIN — FENTANYL CITRATE 100 MCG: 50 INJECTION, SOLUTION INTRAMUSCULAR; INTRAVENOUS at 10:17

## 2017-04-04 RX ADMIN — POTASSIUM CHLORIDE: 2 INJECTION, SOLUTION, CONCENTRATE INTRAVENOUS at 23:00

## 2017-04-04 RX ADMIN — ETOMIDATE 20 MG: 2 INJECTION INTRAVENOUS at 08:34

## 2017-04-04 RX ADMIN — POTASSIUM CHLORIDE: 2 INJECTION, SOLUTION, CONCENTRATE INTRAVENOUS at 20:54

## 2017-04-04 RX ADMIN — PHENYLEPHRINE HYDROCHLORIDE 100 MCG: 10 INJECTION INTRAMUSCULAR; INTRAVENOUS; SUBCUTANEOUS at 08:39

## 2017-04-04 RX ADMIN — ESMOLOL HYDROCHLORIDE 20 MG: 10 INJECTION INTRAVENOUS at 09:03

## 2017-04-04 RX ADMIN — ESMOLOL HYDROCHLORIDE 20 MG: 10 INJECTION INTRAVENOUS at 10:14

## 2017-04-04 RX ADMIN — SODIUM CHLORIDE: 9 INJECTION, SOLUTION INTRAVENOUS at 11:30

## 2017-04-04 RX ADMIN — PHENYLEPHRINE HYDROCHLORIDE 100 MCG: 10 INJECTION INTRAMUSCULAR; INTRAVENOUS; SUBCUTANEOUS at 09:00

## 2017-04-04 RX ADMIN — MIDAZOLAM HYDROCHLORIDE 2 MG: 1 INJECTION, SOLUTION INTRAMUSCULAR; INTRAVENOUS at 08:12

## 2017-04-04 RX ADMIN — PHENYLEPHRINE HYDROCHLORIDE: 10 INJECTION, SOLUTION INTRAMUSCULAR; INTRAVENOUS; SUBCUTANEOUS at 10:00

## 2017-04-04 RX ADMIN — Medication 10 ML: at 07:38

## 2017-04-04 RX ADMIN — SODIUM CHLORIDE: 9 INJECTION, SOLUTION INTRAVENOUS at 10:15

## 2017-04-04 RX ADMIN — SUCCINYLCHOLINE CHLORIDE 160 MG: 20 INJECTION INTRAMUSCULAR; INTRAVENOUS at 08:12

## 2017-04-04 RX ADMIN — FAMOTIDINE 20 MG: 20 TABLET ORAL at 07:37

## 2017-04-04 RX ADMIN — ROCURONIUM BROMIDE 10 MG: 10 INJECTION, SOLUTION INTRAVENOUS at 10:00

## 2017-04-04 NOTE — ANESTHESIA PREPROCEDURE EVALUATION
Anesthetic History   No history of anesthetic complications            Review of Systems / Medical History  Patient summary reviewed and pertinent labs reviewed    Pulmonary  Within defined limits                 Neuro/Psych   Within defined limits           Cardiovascular    Hypertension        Dysrhythmias   CAD and PAD         GI/Hepatic/Renal         Renal disease: CRI       Endo/Other        Arthritis     Other Findings   Comments:   Risk Factors for Postoperative nausea/vomiting:       History of postoperative nausea/vomiting? NO       Female? NO       Motion sickness? NO       Intended opioid administration for postoperative analgesia?   YES           Physical Exam    Airway  Mallampati: II  TM Distance: 4 - 6 cm  Neck ROM: normal range of motion   Mouth opening: Normal     Cardiovascular    Rhythm: irregular  Rate: normal         Dental    Dentition: Edentulous     Pulmonary      Decreased breath sounds: bilateral           Abdominal  GI exam deferred       Other Findings            Anesthetic Plan    ASA: 3  Anesthesia type: general          Induction: Intravenous  Anesthetic plan and risks discussed with: Patient

## 2017-04-04 NOTE — ANESTHESIA POSTPROCEDURE EVALUATION
Post-Anesthesia Evaluation and Assessment    Patient: Mamie Aguilar MRN: [id number]  SSN: [id number]    YOB: 1960  Age: 59 y.o. Sex: male       Cardiovascular Function/Vital Signs  Visit Vitals    /86    Pulse (!) 124    Temp 36.4 °C (97.5 °F)    Resp 16    Ht 5' 10\" (1.778 m)    Wt 77.1 kg (170 lb)    SpO2 97%    BMI 24.39 kg/m2       Patient is status post general anesthesia for Procedure(s):  RIGHT AXILLO-FEMORAL BYPASS. Nausea/Vomiting: None    Postoperative hydration reviewed and adequate. Pain:  Pain Scale 1: Numeric (0 - 10) (04/04/17 1600)  Pain Intensity 1: 0 (04/04/17 1600)   Managed    Neurological Status:   Neuro (WDL): Within Defined Limits (04/04/17 0727)   At baseline    Mental Status and Level of Consciousness: Arousable    Pulmonary Status:   O2 Device: Nasal cannula (04/04/17 1300)   Adequate oxygenation and airway patent    Complications related to anesthesia: None    Post-anesthesia assessment completed.  No concerns    Signed By: Deysi Edge MD     April 4, 2017

## 2017-04-04 NOTE — INTERVAL H&P NOTE
H&P Update:  Alayna Singer was seen and examined. History and physical has been reviewed. The patient has been examined.  There have been no significant clinical changes since the completion of the originally dated History and Physical.    Signed By: Kerry Wu MD     April 4, 2017 7:20 AM

## 2017-04-04 NOTE — PROGRESS NOTES
Bedside and Verbal shift change report given to Katherine Avery RN  (oncoming nurse) by Kirby Reynolds (offgoing nurse). Report included the following information SBAR, Kardex, MAR and Recent Results.       Kirby Reynolds

## 2017-04-04 NOTE — H&P (VIEW-ONLY)
Francy Fontaine    Chief Complaint   Patient presents with    Carotid Artery Stenosis       History and Physical    Francy Fontaine is a 59 y.o. male with known aortoiliac occlusive disease. Patient is in need of revascularization of his lower extremities as he does have rest pain of bilateral lower extremities. On workup he was found to have some coronary ischemia and was found to have multivessel disease. He ended up getting multiple drug-eluting stents. Because of this he is unable to get an aortobifemoral bypass. But he is still a candidate for a right axillary to bifemoral bypass. Patient was found to have an incidental finding of a right internal carotid artery which is occluded. He has no symptoms of TIA or strokelike symptoms. Currently continues to have a lot of rest pain bilateral lower extremities.     Past Medical History:   Diagnosis Date    A-fib [facility])     Abnormal EKG     CAD S/P percutaneous coronary angioplasty 03/2017    Essential hypertension     Lumbar canal stenosis 05/04/2015    Dr. Mumtaz Burciaga PVD (peripheral vascular disease) [facility])      Past Surgical History:   Procedure Laterality Date    CARDIAC CATHETERIZATION  3/8/2017         CARDIAC CATHETERIZATION  3/15/2017         CORONARY ARTERY ANGIOGRAM  3/8/2017         CORONARY STENT EA ADDL VESSEL  3/15/2017         CORONARY STENT SINGLE W/PTCA  3/15/2017         HX COLONOSCOPY  07/23/2015    polyps repeat 2020 5 years [facility]    LV ANGIOGRAPHY  3/8/2017          Patient Active Problem List   Diagnosis Code    Hypertension I10    Vitamin B12 deficiency E53.8    Low back pain M54.5    DDD (degenerative disc disease), lumbar M51.36    Erectile dysfunction N52.9    Lumbar neuritis M54.16    Spinal stenosis, lumbar region, without neurogenic claudication M48.06    Hereditary peripheral neuropathy G60.9    Spinal stenosis, lumbar M48.06    Aortic valve stenosis I35.0    Hereditary and idiopathic neuropathy, unspecified G60.9    Radiculopathy, lumbar region M54.16    Aortoiliac occlusive disease ([facility]) I74.09    Atherosclerosis of native artery of both lower extremities with intermittent claudication ([facility]) I70.213    PAD (peripheral artery disease) ([facility]) I73.9    CAD (coronary artery disease) I25.10     Current Outpatient Prescriptions   Medication Sig Dispense Refill    atorvastatin (LIPITOR) 40 mg tablet Take 1 Tab by mouth daily. 90 Tab 0    clopidogrel (PLAVIX) 75 mg tab Take 1 Tab by mouth daily. 30 Tab 11    metoprolol tartrate (LOPRESSOR) 50 mg tablet Take 1 Tab by mouth three (3) times daily. 60 Tab 3    DULoxetine (CYMBALTA) 60 mg capsule Take 1 Cap by mouth daily. 30 Cap 0    aspirin 81 mg chewable tablet Take 81 mg by mouth daily.  amLODIPine (NORVASC) 10 mg tablet Take 1 Tab by mouth daily. 90 Tab 3    cyanocobalamin (VITAMIN B-12) 1,000 mcg tablet Take 1 Tab by mouth daily. 90 Tab 3    hydrochlorothiazide (HYDRODIURIL) 25 mg tablet Take 1 Tab by mouth daily. 90 Tab 3    losartan (COZAAR) 100 mg tablet Take 1 Tab by mouth daily. 90 Tab 3    POTASSIUM PHOS,M-BASIC-D-BASIC (POTASSIUM PHOSPHATE PO) Take  by mouth. Take as directed      DIGITEK 250 mcg tablet take 1 tablet by mouth twice a day then 1 tablet by mouth once daily  0    oxyCODONE-acetaminophen (PERCOCET) 5-325 mg per tablet Take 1 Tab by mouth every four (4) hours as needed for Pain. Max Daily Amount: 6 Tabs. 80 Tab 0    avanafil (STENDRA) 100 mg tablet Take 1 Tab by mouth as needed for Other. Take 1 tab 15-30 mins prior to sexual acitivity 12 Tab 0     No Known Allergies  Social History     Social History    Marital status: LEGALLY      Spouse name: N/A    Number of children: N/A    Years of education: N/A     Occupational History    Not on file.      Social History Main Topics    Smoking status: Former Smoker    Smokeless tobacco: Never Used      Comment: quit in 2011    Alcohol use 1.2 oz/week     2 Cans of beer per week Comment: 10 cans of beer a month    Drug use: Yes     Special: Marijuana      Comment: occasionally    Sexual activity: Yes     Partners: Female     Other Topics Concern    Not on file     Social History Narrative      Family History   Problem Relation Age of Onset    Heart Disease Father        Physical Exam:    Visit Vitals    /70 (BP 1 Location: Right arm, BP Patient Position: Sitting)    Pulse 98    Resp 18    Ht 5' 10\" (1.778 m)    Wt 170 lb (77.1 kg)    BMI 24.39 kg/m2      General: Well-appearing male in no acute distress  HEENT: EOMI no scleral icterus is noted  Pulmonary: No increase of work of breathing is noted  Extremities: Warm and perfused although no palpable distal pulses. Neuro: Cranial nerves II through XII grossly intact    Impression and Plan:  Richelle Mak is a 59 y.o. male with aortoiliac occlusive disease in need of revascularization of his lower extremities given his rest pain. I recommended right axillary to bifemoral bypass with possible common femoral endarterectomy as necessary. Patient is already aware of all the risks and benefits of the procedure as we have gone over it in depth perfectly previously. We will have to perform the surgery on Plavix given his drug-eluting stents. I told him that this does increase his risk of bleeding but he does need a procedure that is going to last some long time and be able to work long-term for him. The right axillary to bifemoral bypass is that option. We will get him on the schedule as soon as we can. We reviewed the plan with the patient and the patient understands. We also gave the patient appropriate instructions on their disease process and when to call back. Follow-up Disposition: Not on [address] [place] [address], MD    PLEASE NOTE:  This document has been produced using voice recognition software. Unrecognized errors in transcription may be present.

## 2017-04-04 NOTE — BRIEF OP NOTE
BRIEF OPERATIVE NOTE    Date of Procedure: 4/4/2017   Preoperative Diagnosis: Atheroscler of native artery of both legs with intermit claudication ([facility] [address] [I70.213]  Postoperative Diagnosis: Atheroscler of native artery of both legs with intermit claudication ([address] [I70.213]    Procedure(s):  RIGHT AXILLO-FEMORAL BYPASS  Surgeon(s) and Role:     * Ann Patel MD - Primary            Surgical Staff:  Circ-1: Jf Chaudhry RN  Circ-2: Kamlesh Monique RN  Scrub Tech-1: Frannie Duran  Surg Asst-1: Autumn Hauser  RCIS-1: Tray Wood  Event Time In   Incision Start 2162   Incision Close      Anesthesia: General   Estimated Blood Loss: 300mL  Specimens: * No specimens in log *   Findings: heavy calcification and stenosis noted in right CFA and proximal right SFA   Complications: none  Implants:   Implant Name Type Inv.  Item Serial No.  Lot No. LRB No. Used Action   GRAFT TW STRTCH RR [id number] -- [employer] - [id number]  GRAFT TW STRTCH RR [id number] -- [employer]EN [id number]  [employer]  Right 1 Implanted   GRAFT TW STRTCH RR [id number] -- [employer] - [id number]   GRAFT TW STRTCH RR [id number] -- [employer] [id number] [employer]   Right 1 Implanted

## 2017-04-04 NOTE — IP AVS SNAPSHOT
[address] [place] [address] 
[phone] Patient: Alayna Singer MRN: [id number] [id number] You are allergic to the following No active allergies Recent Documentation Height Weight BMI Smoking Status 1.778 m 82 kg 25.93 kg/m2 Former Smoker Emergency Contacts Name Discharge Info Relation Home Work Mobile Lynnette Caro DISCHARGE CAREGIVER [3] Daughter [21] [phone] About your hospitalization You were admitted on:  April 4, 2017 You last received care in the:  [facility] 2 CV STEPDOWN You were discharged on:  April 21, 2017 Unit phone number:  [phone] Why you were hospitalized Your primary diagnosis was: 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) Your diagnoses also included: Aortoiliac Occlusive Disease (Hcc), Peripheral Artery Disease (Hcc), Chronic Atrial Fibrillation (Hcc), Transient Alteration Of Awareness, Acute Blood Loss As Cause Of Postoperative Anemia, Impaired Mobility And Adls, Anticoagulated On Coumadin, Benign Hypertensive Heart Disease With Systolic Congestive Heart Failure, Nyha Class 2 (Hcc), Hypomagnesemia, Elevated Creatine Kinase Level, Hypokalemia, Hyperammonemia (Hcc), Elevated Tsh, Aftercare Following Surgery Of The Circulatory System, Status Post Femoral-Popliteal Bypass Surgery, History Of Cardioversion Providers Seen During Your Hospitalizations Provider Role Specialty Primary office phone Kerry Wu MD Attending Provider Vascular Surgery [phone] Your Primary Care Physician (PCP) Primary Care Physician Office Phone Office Fax Pan Thorpe [phone] Follow-up Information Follow up With Details Comments Contact Info  Teresa Rodriguez MD On 5/1/2017 @ 11:45 we have try to get an earlier appt within 7 days, myrna yeh states they want to see patient an dif any thing coming earlier they bianka call patient [address] [facility] 
[phone] Liam Fisher MD On 5/17/2017 @ 1:20 [address] [facility] 
[phone] Your Appointments Monday May 01, 2017 11:45 AM EDT  
POST HOSPITAL with Robin Singer MD  
[facility] ([address]) [address] [facility]  
[phone] Wednesday May 17, 2017  1:20 PM EDT  
POST HOSPITAL with Liam Fisher MD  
[facility] ([address]) [place] [address]  
[phone] Current Discharge Medication List  
  
START taking these medications Dose & Instructions Dispensing Information Comments Morning Noon Evening Bedtime  
 amiodarone 400 mg tablet Commonly known as:  Mary Carmen Lan Your last dose was: Your next dose is:    
   
   
 Dose:  400 mg Take 1 Tab by mouth two (2) times a day. Quantity:  62 Tab Refills:  3  
     
   
   
   
  
 dilTIAZem  mg ER capsule Commonly known as:  CARDIZEM CD Your last dose was: Your next dose is:    
   
   
 Dose:  120 mg Take 1 Cap by mouth daily. Quantity:  30 Cap Refills:  6  
     
   
   
   
  
 oxyCODONE-acetaminophen  mg per tablet Commonly known as:  PERCOCET 10 Replaces:  oxyCODONE-acetaminophen 5-325 mg per tablet Your last dose was: Your next dose is:    
   
   
 Dose:  1 Tab Take 1 Tab by mouth every four (4) hours as needed. Max Daily Amount: 6 Tabs. Quantity:  80 Tab Refills:  0  
     
   
   
   
  
 warfarin 2.5 mg tablet Commonly known as:  COUMADIN Your last dose was: Your next dose is:    
   
   
 Dose:  2.5 mg Take 1 Tab by mouth daily. Quantity:  31 Tab Refills:  prn CONTINUE these medications which have CHANGED Dose & Instructions Dispensing Information Comments Morning Noon Evening Bedtime  
 metoprolol tartrate 25 mg tablet Commonly known as:  LOPRESSOR What changed:   
- medication strength 
- how much to take - when to take this Your last dose was: Your next dose is:    
   
   
 Dose:  25 mg Take 1 Tab by mouth every twelve (12) hours. Quantity:  62 Tab Refills:  6 CONTINUE these medications which have NOT CHANGED Dose & Instructions Dispensing Information Comments Morning Noon Evening Bedtime  
 amLODIPine 10 mg tablet Commonly known as:  Unknown Dana Your last dose was: Your next dose is:    
   
   
 Dose:  10 mg Take 1 Tab by mouth daily. Quantity:  90 Tab Refills:  3  
     
   
   
   
  
 aspirin 81 mg chewable tablet Your last dose was: Your next dose is:    
   
   
 Dose:  81 mg Take 81 mg by mouth daily. Refills:  0  
     
   
   
   
  
 atorvastatin 40 mg tablet Commonly known as:  LIPITOR Your last dose was: Your next dose is:    
   
   
 Dose:  40 mg Take 1 Tab by mouth daily. Quantity:  90 Tab Refills:  0  
     
   
   
   
  
 avanafil 100 mg tablet Commonly known as:  AHJFSRC Your last dose was: Your next dose is:    
   
   
 Dose:  100 mg Take 1 Tab by mouth as needed for Other. Take 1 tab 15-30 mins prior to sexual acitivity Quantity:  12 Tab Refills:  0  
     
   
   
   
  
 clopidogrel 75 mg Tab Commonly known as:  PLAVIX Your last dose was: Your next dose is:    
   
   
 Dose:  75 mg Take 1 Tab by mouth daily. Quantity:  30 Tab Refills:  11  
     
   
   
   
  
 cyanocobalamin 1,000 mcg tablet Commonly known as:  VITAMIN B-12 Your last dose was: Your next dose is: Dose:  1000 mcg Take 1 Tab by mouth daily. Quantity:  90 Tab Refills:  3 DULoxetine 60 mg capsule Commonly known as:  CYMBALTA Your last dose was: Your next dose is:    
   
   
 Dose:  60 mg Take 1 Cap by mouth daily. Quantity:  30 Cap Refills:  0  
     
   
   
   
  
 hydroCHLOROthiazide 25 mg tablet Commonly known as:  HYDRODIURIL Your last dose was: Your next dose is:    
   
   
 Dose:  25 mg Take 1 Tab by mouth daily. Quantity:  90 Tab Refills:  3  
     
   
   
   
  
 losartan 100 mg tablet Commonly known as:  COZAAR Your last dose was: Your next dose is:    
   
   
 Dose:  100 mg Take 1 Tab by mouth daily. Quantity:  90 Tab Refills:  3 POTASSIUM PHOSPHATE PO Your last dose was: Your next dose is: Take  by mouth. Take as directed Refills:  0 STOP taking these medications   
 oxyCODONE-acetaminophen 5-325 mg per tablet Commonly known as:  PERCOCET Replaced by:  oxyCODONE-acetaminophen  mg per tablet Where to Get Your Medications These medications were sent to [address] [facility], [address] [facility] [address], [address] [place] [address] Hours:  24-hours Phone:  [phone]  
  amiodarone 400 mg tablet  
 dilTIAZem  mg ER capsule  
 metoprolol tartrate 25 mg tablet  
 warfarin 2.5 mg tablet Information on where to get these meds will be given to you by the nurse or doctor. ! Ask your nurse or doctor about these medications  
  oxyCODONE-acetaminophen  mg per tablet Discharge Instructions Learning About Aortobifemoral Bypass Surgery for Peripheral Arterial Disease What is an aortobifemoral bypass?  
 
An aortobifemoral (say \"cg-ZT-xts-by-FEM-uh-nani\") bypass is surgery to redirect blood flow around blocked blood vessels in your belly or pelvis. These blocked blood vessels are caused by peripheral arterial disease. The surgery is done to get more blood flow to the legs. This may allow you to walk farther. Your doctor will use a man-made blood vessel, called a graft, to bypass the blocked blood vessels. The graft will carry blood from the aorta to the femoral artery in the groin area of each thigh. The aorta is the large blood vessel that carries blood from the heart to the blood vessels in the belly. The femoral arteries are large blood vessels that carry blood from the blood vessels in the belly and pelvis to the legs. How is the surgery done? You will be asleep during the surgery. The doctor will make cuts (incisions) in your belly and in the groin area of each thigh. The doctor will connect the graft to the aorta through the cut in the belly. He or she will then tunnel the graft down to the cuts in your groin. This connects the bypass to your femoral arteries. When the graft is in place, the doctor will close the cuts in your skin with stitches or staples. What can you expect after this surgery? You will probably spend 4 to 7 days in the hospital. Your belly and groin will be sore for several weeks. You will probably feel more tired than usual for several weeks. You may be able to do many of your usual activities after 4 to 6 weeks. But you will likely need 2 to 3 months to fully recover. You will probably need to take at least 4 to 6 weeks off from work. It depends on the type of work you do and how you feel. Follow-up care is a key part of your treatment and safety. Be sure to make and go to all appointments, and call your doctor if you are having problems. It's also a good idea to know your test results and keep a list of the medicines you take. Where can you learn more? Go to http://yuniel-lyndsay.info/. Enter 24 413207 in the search box to learn more about \"Learning About Aortobifemoral Bypass Surgery for Peripheral Arterial Disease. \" Current as of: January 27, 2016 Content Version: 11.2 © 7365-8079 [employer]. Care instructions adapted under license by [employer] (which disclaims liability or warranty for this information). If you have questions about a medical condition or this instruction, always ask your healthcare professional. [place] [address] any warranty or liability for your use of this information. Helping Your Child Take Blood Thinners Safely: Care Instructions Your Care Instructions Blood thinners are medicines that help prevent blood clots and keep them from growing bigger. They don't actually thin the blood. They slow down the time it takes for a blood clot to form. Blood thinners also keep existing blood clots from getting bigger. Your doctor may call these medicines anticoagulants. Your child may take this medicine as a pill. Or the blood thinner may be given as a shot. Blood thinners can make a child more likely to bleed. But they can also save lives. With care, you can help prevent bleeding and keep your child safe while letting him or her play and be active. Follow-up care is a key part of your child's treatment and safety. Be sure to make and go to all appointments, and call your doctor if your child is having problems. It's also a good idea to know your child's test results and keep a list of the medicines your child takes. How can you care for your child at home? · Be safe with medicines. Have your child take medicines exactly as prescribed. Call your doctor if you think your child is having a problem with his or her medicine. · If your child misses a dose of medicine, don't give an extra dose to make up for it. Your doctor can tell you what to do so you don't give too much or too little. · Talk to the doctor before you start or stop giving your child any medicines, vitamins, or natural remedies. Medicines may affect how blood thinners work. · Ask your pharmacist how to store the blood thinner. · Have your child wear medical alert jewelry. This lets others know that your child takes a blood thinner. You can buy it at most drugstores. · Tell your child's doctors, dentist, and pharmacist that your child is taking a blood thinner. Also tell the people who care for your child, such as relatives, babysitters, and the school nurse. Let them know what to do if your child has a cut or bruise and when to call for help. · If your child takes heparin, learn how to give a shot. Ask your doctor for instructions. · If your child takes warfarin: ¨ Keep the amount of vitamin K in your child's diet about the same from day to day. Vitamin K affects how warfarin works and how the blood clots. It is in many foods, such as leafy greens, green vegetables, and vegetable oils. ¨ Take your child to the doctor for scheduled blood tests. These tests check how long it takes the blood to form a clot. The doctor may adjust your child's dose of warfarin based on the results. When should you call for help? Call 911 anytime you think your child may need emergency care. For example, call if: 
· Your child has a sudden, severe headache that is different from past headaches. Call your doctor now or seek immediate medical care if: 
· Your child has any abnormal bleeding, such as: 
¨ Nosebleeds. ¨ Vaginal bleeding that is different (heavier, more frequent, at a different time of the month) than what your child is used to. ¨ Bloody or black stools, or rectal bleeding. ¨ Bloody or pink urine. · Your child has an injury from a fall. Watch closely for changes in your child's health, and be sure to contact your doctor if your child has any problems. Where can you learn more? Go to http://yuniel-lyndsay.info/. Enter X159 in the search box to learn more about \"Helping Your Child Take Blood Thinners Safely: Care Instructions. \" Current as of: October 11, 2016 Content Version: 11.2 © 2318-7657 [employer]. Care instructions adapted under license by [employer] (which disclaims liability or warranty for this information). If you have questions about a medical condition or this instruction, always ask your healthcare professional. [address] any warranty or liability for your use of this information. Call if fever>101.4F, red/pus at incisions, pain>meds. Discharge Instructions Attachments/References AORTOBIFEMORAL BYPASS SURGERY FOR PAD: GENERAL INFO (ENGLISH) ANEMIA: IRON DEFICIENCY (ENGLISH) ANEMIA: HEAVY BLEEDING (ENGLISH) AMIODARONE (BY MOUTH) (ENGLISH) DILTIAZEM (BY MOUTH) (ENGLISH) METOPROLOL (BY MOUTH) (ENGLISH) OXYCODONE/ACETAMINOPHEN (BY MOUTH) (ENGLISH) Discharge Orders None [facility] Announcement We are excited to announce that we are making your provider's discharge notes available to you in [facility]. You will see these notes when they are completed and signed by the physician that discharged you from your recent hospital stay. If you have any questions or concerns about any information you see in [facility], please call the Health Information Department where you were seen or reach out to your Primary Care Provider for more information about your plan of care. General Information Please provide this summary of care documentation to your next provider. Patient Signature:  ____________________________________________________________ Date:  ____________________________________________________________  
  
Boone Dela Cruz Provider Signature:  ____________________________________________________________ Date:  ____________________________________________________________ More Information Learning About Aortobifemoral Bypass Surgery for Peripheral Arterial Disease What is an aortobifemoral bypass? An aortobifemoral (say \"rk-LG-uqb-by-FEM-uh-nani\") bypass is surgery to redirect blood flow around blocked blood vessels in your belly or pelvis. These blocked blood vessels are caused by peripheral arterial disease. The surgery is done to get more blood flow to the legs. This may allow you to walk farther. Your doctor will use a man-made blood vessel, called a graft, to bypass the blocked blood vessels. The graft will carry blood from the aorta to the femoral artery in the groin area of each thigh. The aorta is the large blood vessel that carries blood from the heart to the blood vessels in the belly. The femoral arteries are large blood vessels that carry blood from the blood vessels in the belly and pelvis to the legs. How is the surgery done? You will be asleep during the surgery. The doctor will make cuts (incisions) in your belly and in the groin area of each thigh. The doctor will connect the graft to the aorta through the cut in the belly. He or she will then tunnel the graft down to the cuts in your groin. This connects the bypass to your femoral arteries. When the graft is in place, the doctor will close the cuts in your skin with stitches or staples. What can you expect after this surgery? You will probably spend 4 to 7 days in the hospital. Your belly and groin will be sore for several weeks. You will probably feel more tired than usual for several weeks. You may be able to do many of your usual activities after 4 to 6 weeks. But you will likely need 2 to 3 months to fully recover. You will probably need to take at least 4 to 6 weeks off from work. It depends on the type of work you do and how you feel. Follow-up care is a key part of your treatment and safety.  Be sure to make and go to all appointments, and call your doctor if you are having problems. It's also a good idea to know your test results and keep a list of the medicines you take. Where can you learn more? Go to http://yuniel-lyndsay.info/. Enter 24 [id number] in the search box to learn more about \"Learning About Aortobifemoral Bypass Surgery for Peripheral Arterial Disease. \" Current as of: January 27, 2016 Content Version: 11.2 © 8771-5288 [employer]. Care instructions adapted under license by [employer] (which disclaims liability or warranty for this information). If you have questions about a medical condition or this instruction, always ask your healthcare professional. [address] any warranty or liability for your use of this information. Iron Deficiency Anemia: Care Instructions Your Care Instructions Anemia means that you do not have enough red blood cells. Red blood cells carry oxygen around your body. When you have anemia, it can make you pale, weak, and tired. Many things can cause anemia. The most common cause is loss of blood. This can happen if you have heavy menstrual periods. It can also happen if you have bleeding in your stomach or bowel. You can also get anemia if you don't have enough iron in your diet or if it's hard for your body to absorb iron. In some cases, pregnancy causes anemia. That's because a pregnant woman needs more iron. Your doctor may do more tests to find the cause of your anemia. If a disease or other health problem is causing it, your doctor will treat that problem. It's important to follow up with your doctor to make sure that your iron level returns to normal. 
Follow-up care is a key part of your treatment and safety. Be sure to make and go to all appointments, and call your doctor if you are having problems. It's also a good idea to know your test results and keep a list of the medicines you take. How can you care for yourself at home? · If your doctor recommended iron pills, take them as directed. ¨ Try to take the pills on an empty stomach. You can do this about 1 hour before or 2 hours after meals. But you may need to take iron with food to avoid an upset stomach. ¨ Do not take antacids or drink milk or anything with caffeine within 2 hours of when you take your iron. They can keep your body from absorbing the iron well. ¨ Vitamin C helps your body absorb iron. You may want to take iron pills with a glass of orange juice or some other food high in vitamin C. 
¨ Iron pills may cause stomach problems. These include heartburn, nausea, diarrhea, constipation, and cramps. It can help to drink plenty of fluids and include fruits, vegetables, and fiber in your diet. ¨ It's normal for iron pills to make your stool a greenish or grayish black. But internal bleeding can also cause dark stool. So it's important to tell your doctor about any color changes. ¨ Call your doctor if you think you are having a problem with your iron pills. Even after you start to feel better, it will take several months for your body to build up its supply of iron. ¨ If you miss a pill, don't take a double dose. ¨ Keep iron pills out of the reach of small children. Too much iron can be very dangerous. · Eat foods with a lot of iron. These include red meat, shellfish, poultry, and eggs. They also include beans, raisins, whole-grain bread, and leafy green vegetables. · Steam your vegetables. This is the best way to prepare them if you want to get as much iron as possible. · Be safe with medicines. Do not take nonsteroidal anti-inflammatory pain relievers unless your doctor tells you to. These include aspirin, naproxen (Aleve), and ibuprofen (Advil, Motrin). · Liquid iron can stain your teeth. But you can mix it with water or juice and drink it with a straw. Then it won't get on your teeth. When should you call for help? Call 911 anytime you think you may need emergency care. For example, call if: 
· You passed out (lost consciousness). · You vomit blood or what looks like coffee grounds. · You pass maroon or very bloody stools. Call your doctor now or seek immediate medical care if: 
· Your stools are black and look like tar, or they have streaks of blood. · You are dizzy or lightheaded, or you feel like you may faint. Watch closely for changes in your health, and be sure to contact your doctor if: 
· Your fatigue and weakness continue or get worse. · You have side effects from taking iron pills, such as nausea, vomiting, constipation, diarrhea, or heartburn. · You do not get better as expected. Where can you learn more? Go to http://yuniel-lyndsay.info/. Enter B869 in the search box to learn more about \"Iron Deficiency Anemia: Care Instructions. \" Current as of: October 13, 2016 Content Version: 11.2 © 2777-9950 [employer]. Care instructions adapted under license by [employer] (which disclaims liability or warranty for this information). If you have questions about a medical condition or this instruction, always ask your healthcare professional. [place] [address] any warranty or liability for your use of this information. Anemia From Heavy Bleeding: Care Instructions Your Care Instructions Anemia means that your body does not have enough red blood cells. Red blood cells carry oxygen around the body. When you have anemia, you may feel dizzy, tired, and weak. You may also feel your heart pounding. For some people, it's hard to focus and think clearly. One common cause of anemia is bleeding. Bleeding from ulcers, hemorrhoids, cancer, or other problems can cause anemia. It may also be caused by heavy menstrual periods. Your treatment may include iron pills.  Iron helps your body make hemoglobin. Hemoglobin is the part of the red blood cell that carries oxygen. If you have severe anemia, you may need a blood transfusion to give you red blood cells as quickly as possible. Sometimes it takes several months to get iron levels back to normal. 
Follow-up care is a key part of your treatment and safety. Be sure to make and go to all appointments, and call your doctor if you are having problems. It's also a good idea to know your test results and keep a list of the medicines you take. How can you care for yourself at home? · Be safe with medicines. Take your medicines exactly as prescribed. Call your doctor if you think you are having a problem with your medicine. · Follow your doctor's advice about eating foods that have a lot of iron in them. These include red meat, shellfish, poultry, and eggs. They also include beans, raisins, whole-grain bread, and leafy green vegetables. · Steam your vegetables. This is the best way to prepare them if you want to get as much iron as possible. · Iron pills can cause constipation. If you take them, there are things you can do to avoid constipation. Drink plenty of fluids, eat foods with a lot of fiber, and exercise every day. When should you call for help? Call 911 anytime you think you may need emergency care. For example, call if: 
· You passed out (lost consciousness). · Your stools are maroon or very bloody. Call your doctor now or seek immediate medical care if: 
· You have new or worse trouble breathing. · You are dizzy or lightheaded, or you feel like you may faint. · You have any abnormal bleeding, such as: 
¨ Nosebleeds. ¨ Vaginal bleeding that is different (heavier, more frequent, at a different time of the month) than what you are used to. ¨ Bloody or black stools, or rectal bleeding. ¨ Bloody or pink urine.  
Watch closely for changes in your health, and be sure to contact your doctor if: 
· You feel weaker or more tired than usual. 
 · You do not get better as expected. Where can you learn more? Go to http://yuniel-lyndsay.info/. Enter J038 in the search box to learn more about \"Anemia From Heavy Bleeding: Care Instructions. \" Current as of: October 13, 2016 Content Version: 11.2 © 4615-0810 [employer]. Care instructions adapted under license by [facility] (which disclaims liability or warranty for this information). If you have questions about a medical condition or this instruction, always ask your healthcare professional. [address] any warranty or liability for your use of this information. Amiodarone (By mouth) Amiodarone Hydrochloride (w-lxy-EN-da-hood janna-droe-KLOR-john paul) Treats heart rhythm problems. Brand Name(s):Cordarone, Pacerone There may be other brand names for this medicine. When This Medicine Should Not Be Used: This medicine is not right for everyone. Do not use it if you had an allergic reaction to amiodarone or iodine, or you are pregnant or breastfeeding. How to Use This Medicine:  
Tablet · Take your medicine as directed. Your dose may need to be changed several times to find what works best for you. · Take this medicine the same way every day. This means take it at the same time and take it consistently with or without food. · This medicine should come with a Medication Guide. Ask your pharmacist for a copy if you do not have one. · Missed dose: Take a dose as soon as you remember. If it is almost time for your next dose, wait until then and take a regular dose. Do not take extra medicine to make up for a missed dose. · Store the medicine in a closed container at room temperature, away from heat, moisture, and direct light. Drugs and Foods to Avoid: Ask your doctor or pharmacist before using any other medicine, including over-the-counter medicines, vitamins, and herbal products. · Some foods and medicines can affect how amiodarone works. Tell your doctor if you are using any of the following: ¨ Cimetidine, clopidogrel, cyclosporine, dabigatran, dextromethorphan, digoxin, fentanyl, lithium, loratadine, phenytoin, rifampin, Eryn's wort ¨ Blood pressure medicines (including diltiazem, verapamil) ¨ Blood thinner (including warfarin) ¨ Medicine to lower cholesterol (including atorvastatin, cholestyramine, lovastatin, simvastatin) ¨ Medicine to treat depression (including trazodone) ¨ Medicine to treat HIV/AIDS ¨ Medicine to treat an infection ¨ Phenothiazine medicine (including chlorpromazine, perphenazine, promethazine, thioridazine) · Do not eat grapefruit or drink grapefruit juice while you are using this medicine. Warnings While Using This Medicine: · It is not safe to take this medicine during pregnancy. It could harm an unborn baby. Tell your doctor right away if you become pregnant. · Tell your doctor if you have liver problems, heart disease, heart failure, thyroid problems, or lung disease or breathing problems. Tell your doctor if you have a pacemaker or another implanted heart device. · This medicine may cause the following problems: 
¨ Lung problems ¨ Worsening heart rhythm problems ¨ Thyroid problems ¨ Liver problems ¨ Changes in vision · Do not stop using this medicine suddenly. Your doctor will need to slowly decrease your dose before you stop it completely. · This medicine may make your skin more sensitive to sunlight. Wear sunscreen. Do not use sunlamps or tanning beds. Your skin may become discolored if you take this medicine for a long time. · Your doctor will do lab tests at regular visits to check on the effects of this medicine. Keep all appointments. · Keep all medicine out of the reach of children. Never share your medicine with anyone. Possible Side Effects While Using This Medicine: Call your doctor right away if you notice any of these side effects: · Allergic reaction: Itching or hives, swelling in your face or hands, swelling or tingling in your mouth or throat, chest tightness, trouble breathing · Blistering, peeling, red skin rash · Blurred vision or other vision changes, eye pain · Chest pain, cough, trouble breathing · Dark urine or pale stools, nausea, vomiting, loss of appetite, stomach pain, yellow skin or eyes · Fast, slow, pounding, or uneven heartbeat (new or worsening) · Lightheadedness, dizziness, fainting · Numbness, tingling, or burning pain in your hands, arms, legs, or feet · Weight gain or loss, nervousness, tremors, trouble sleeping, unusual tiredness, hair loss If you notice these less serious side effects, talk with your doctor: · Mild nausea, vomiting, or constipation If you notice other side effects that you think are caused by this medicine, tell your doctor. Call your doctor for medical advice about side effects. You may report side effects to FDA at 8-666-FDA-7109 © 2016 [address] is for End User's use only and may not be sold, redistributed or otherwise used for commercial purposes. The above information is an  only. It is not intended as medical advice for individual conditions or treatments. Talk to your doctor, nurse or pharmacist before following any medical regimen to see if it is safe and effective for you. Diltiazem (By mouth) Diltiazem (nrv-UYD-h-zem) Treats high blood pressure and angina (chest pain). This medicine is a calcium channel blocker. Brand Name(s):Cardizem, Cardizem CD, Cardizem LA, Cardizem SR, Cartia XT, Dilacor XR, Dilt-XR, Matzim LA, [place], [address],[facility] There may be other brand names for this medicine. When This Medicine Should Not Be Used: This medicine is not right for everyone. Do not use it if you had an allergic reaction to diltiazem or similar medicines. How to Use This Medicine:  
Long Acting Capsule, 12 Hour Capsule, 24 Hour Capsule, Tablet, Long Acting Tablet · Take your medicine as directed. Your dose may need to be changed several times to find what works best for you. · It is best to take this medicine on an empty stomach. · Swallow this medicine whole. Do not crush, break, chew, or open the capsule or tablet. · Missed dose: Take a dose as soon as you remember. If it is almost time for your next dose, wait until then and take a regular dose. Do not take extra medicine to make up for a missed dose. · Store the medicine in a closed container at room temperature, away from heat, moisture, and direct light. Drugs and Foods to Avoid: Ask your doctor or pharmacist before using any other medicine, including over-the-counter medicines, vitamins, and herbal products. · Some foods and medicines can affect how diltiazem works. Tell your doctor if you are using the following: 
¨ Buspirone, carbamazepine, cimetidine, clonidine, cyclosporine, digoxin, lovastatin, midazolam, quinidine, rifampin, simvastatin, triazolam 
¨ Other blood pressure medicine, including a beta-blocker [place] Warnings While Using This Medicine: · Tell your doctor if you are pregnant or breastfeeding, or if you have kidney disease, liver disease, or digestion problems. Tell your doctor about all heart problems that you have, including heart failure or rhythm problems. · This medicine could lower your blood pressure too much, especially when you first use it or if you are dehydrated. Stand or sit up slowly if you feel lightheaded or dizzy. Do not drive or do anything else that could be dangerous until you know how this medicine affects you. · Do not stop using this medicine without asking your doctor, even if you feel well. This medicine will not cure high blood pressure, but it will help keep it in normal range. You may have to take blood pressure medicine for the rest of your life. · Your doctor will do lab tests at regular visits to check on the effects of this medicine. Keep all appointments. · Keep all medicine out of the reach of children. Never share your medicine with anyone. Possible Side Effects While Using This Medicine:  
Call your doctor right away if you notice any of these side effects: · Allergic reaction: Itching or hives, swelling in your face or hands, swelling or tingling in your mouth or throat, chest tightness, trouble breathing · Blistering, peeling, or red skin rash · Fast, slow, uneven, or pounding heartbeat · Rapid weight gain, swelling in your hands, ankles, or feet · Dark urine or pale stools, nausea, vomiting, loss of appetite, stomach pain, yellow skin or eyes If you notice other side effects that you think are caused by this medicine, tell your doctor. Call your doctor for medical advice about side effects. You may report side effects to FDA at 4-346-FDA-0078 © 2016 [address] is for End User's use only and may not be sold, redistributed or otherwise used for commercial purposes. The above information is an  only. It is not intended as medical advice for individual conditions or treatments. Talk to your doctor, nurse or pharmacist before following any medical regimen to see if it is safe and effective for you. Metoprolol (By mouth) Metoprolol (met-oh-PROE-lol) Treats high blood pressure, angina (chest pain), and heart failure. May lower the risk of death after a heart attack. This medicine is a beta-blocker. Brand Name(s):Lopressor, Toprol XL There may be other brand names for this medicine. When This Medicine Should Not Be Used: This medicine is not right for everyone. Do not use if you had an allergic reaction to metoprolol or similar medicines. Do not use this medicine if you have certain blood circulation or heart problems. Ask your doctor about these problems. How to Use This Medicine:  
Tablet, Long Acting Tablet · Take your medicine as directed. Your dose may need to be changed several times to find what works best for you. · Take this medicine with a meal or right after a meal. Take this medicine the same way every day, at the same time. · Swallow the tablet whole with a glass of water. You may break the extended-release tablet in half, but do not chew or crush it. · Missed dose: Take a dose as soon as you remember. If it is almost time for your next dose, wait until then and take a regular dose. Do not take extra medicine to make up for a missed dose. · Store the medicine in a closed container at room temperature, away from heat, moisture, and direct light. Drugs and Foods to Avoid: Ask your doctor or pharmacist before using any other medicine, including over-the-counter medicines, vitamins, and herbal products. · Some medicines can affect how metoprolol works. Tell your doctor if you are taking any of the following: ¨ Digoxin, dipyridamole, hydralazine, hydroxychloroquine, methyldopa, quinidine ¨ Medicine to treat depression (such as bupropion, clomipramine, desipramine, fluoxetine, fluvoxamine, paroxetine, sertraline), medicine to treat mental illness (such as chlorpromazine, fluphenazine, haloperidol, thioridazine), medicine for heart rhythm problems (such as propafenone), HIV/AIDS medicine (such as ritonavir), medicine to treat a fungus infection (such as terbinafine), a monoamine oxidase inhibitor (MAOI), an ergot medicine for headaches, a calcium channel blocker (such as amlodipine, diltiazem, verapamil), or an alpha blocker (such as clonidine, prazosin, reserpine, guanethidine) Warnings While Using This Medicine: · Tell your doctor if you are pregnant or breastfeeding, or if you have blood vessel, heart, or circulation problems (such as heart failure, rhythm problems, or slow heartbeat).  Tell your doctor if you have kidney disease, liver disease, diabetes, lung disease (such as asthma), an overactive thyroid, or a history of allergies. · This medicine may cause worse symptoms of heart failure while the dose is being adjusted. · Do not stop using this medicine suddenly. Your doctor will need to slowly decrease your dose before you stop it completely. · Tell any doctor or dentist who treats you that you are using this medicine. You may need to stop using this medicine several days before you have surgery or medical tests. · This medicine could lower your blood pressure too much, especially when you first use it or if you are dehydrated. Stand or sit up slowly if you feel lightheaded or dizzy. · This medicine may make you dizzy or drowsy. Do not drive or do anything else that could be dangerous until you know how this medicine affects you. · Your doctor will check your progress and the effects of this medicine at regular visits. Keep all appointments. · Keep all medicine out of the reach of children. Never share your medicine with anyone. Possible Side Effects While Using This Medicine:  
Call your doctor right away if you notice any of these side effects: · Allergic reaction: Itching or hives, swelling in your face or hands, swelling or tingling in your mouth or throat, chest tightness, trouble breathing · Lightheadedness, dizziness, or fainting · Slow heartbeat · Swelling in your hands, ankles, or feet, trouble breathing, tiredness · Worsening chest pain If you notice these less serious side effects, talk with your doctor: · Diarrhea · Mild dizziness or tiredness If you notice other side effects that you think are caused by this medicine, tell your doctor. Call your doctor for medical advice about side effects. You may report side effects to FDA at 0-453-FDA-6977 © 2016 [address] is for End User's use only and may not be sold, redistributed or otherwise used for commercial purposes. The above information is an  only. It is not intended as medical advice for individual conditions or treatments. Talk to your doctor, nurse or pharmacist before following any medical regimen to see if it is safe and effective for you. Oxycodone/Acetaminophen (By mouth) Acetaminophen (g-dbxg-u-MIN-oh-fen), Oxycodone Hydrochloride (gn-w-EVU-done janna-droe-KLOR-john paul) Treats moderate to moderately severe pain. This medicine is a narcotic pain reliever. Brand Name(s):Endocet, Percocet, Primlev, Roxicet, Xartemis XR There may be other brand names for this medicine. When This Medicine Should Not Be Used: This medicine is not right for everyone. Do not use it if you had an allergic reaction to acetaminophen or oxycodone, or if you have serious breathing problems (such as severe asthma, hypercarbia, respiratory depression) or paralytic ileus. How to Use This Medicine:  
Capsule, Liquid, Tablet, Long Acting Tablet · Your doctor will tell you how much medicine to use. Do not use more than directed. · Measure the oral liquid medicine with a marked measuring spoon, oral syringe, or medicine cup. · Swallow the extended-release tablet whole. Do not crush, break, or chew it. Do not lick or wet the tablet before placing it in your mouth. Do not give this medicine through a feeding tube. · This medicine should come with a Medication Guide. Ask your pharmacist for a copy if you do not have one. · Store the medicine in a closed container at room temperature, away from heat, moisture, and direct light. Ask your pharmacist about the best way to dispose of medicine you do not use. Drugs and Foods to Avoid: Ask your doctor or pharmacist before using any other medicine, including over-the-counter medicines, vitamins, and herbal products. · Do not use Xartemis XR if you have used an MAO inhibitor in the past 14 days. · Some medicines and foods can affect how this medicine works.  Tell your doctor if you are taking any of the following: ¨ Butorphanol, lamotrigine, nalbuphine, naltrexone, pentazocine, propranolol, zidovudine ¨ Birth control pills, a diuretic (water pill), muscle relaxants, a phenothiazine medicine (chlorpromazine, perphenazine, promethazine, prochlorperazine, thioridazine) · Do not drink alcohol while you are using this medicine. Acetaminophen can damage your liver, and alcohol can increase this risk. Do not take acetaminophen without asking your doctor if you have 3 or more drinks of alcohol every day. · Tell your doctor if you are using any medicine that makes you sleepy, such as allergy medicine or other narcotic pain medicine. Warnings While Using This Medicine: · Tell your doctor if you are pregnant, or if you have kidney disease, liver disease, heart disease, low blood pressure, breathing problems or lung disease, especially if you have asthma, COPD, cor pulmonale, or kyphoscoliosis (curved spine that causes breathing trouble). Tell your doctor if you have urination problems, an underactive thyroid, [place] disease, pancreas or prostate problems, or a stomach disorder. Tell your doctor if you have a history of head injury or brain damage, seizures, or alcohol or drug abuse. Tell your doctor if you are allergic to codeine. · Do not breastfeed while you are using this medicine, unless otherwise directed by your doctor. · This medicine may cause the following problems: 
¨ Liver problems ¨ Serious skin reactions ¨ Low blood pressure · If you take this medicine for more than a few weeks, do not stop taking it suddenly. Your doctor will need to slowly decrease your dose before you stop it completely. · Get emergency help immediately if you think you may have taken too much of this medicine. Signs of an overdose include shallow breathing, fainting, confusion, nausea, vomiting, pinpoint pupils of the eyes, or pale or blue lips, fingernails, or skin. · This medicine may make you dizzy or drowsy. Do not drive or do anything that could be dangerous until you know how this medicine affects you. · This medicine contains acetaminophen. Read the labels of all other medicines you are using to see if they also contain acetaminophen, or ask your doctor or pharmacist. Tammi Fernandez not use more than 4 grams (4,000 milligrams) total of acetaminophen in one day. · This medicine can be habit-forming. Do not use more than your prescribed dose. Call your doctor if you think your medicine is not working. · This medicine may cause constipation, especially with long-term use. Ask your doctor if you should use a laxative to prevent and treat constipation. · Keep all medicine out of the reach of children. Never share your medicine with anyone. Possible Side Effects While Using This Medicine:  
Call your doctor right away if you notice any of these side effects: · Allergic reaction: Itching or hives, swelling in your face or hands, swelling or tingling in your mouth or throat, chest tightness, trouble breathing · Dark urine or pale stools, nausea, vomiting, loss of appetite, stomach pain, yellow skin or eyes · Extreme weakness, shallow breathing, uneven heartbeat, seizures, sweating, or cold or clammy skin · Lightheadedness, dizziness, or fainting · Trouble breathing If you notice these less serious side effects, talk with your doctor: · Constipation · Headache · Mild lightheadedness, sleepiness, or drowsiness · Mild nausea or vomiting If you notice other side effects that you think are caused by this medicine, tell your doctor. Call your doctor for medical advice about side effects. You may report side effects to FDA at 3-549-FDA-1383 © 2016 [address] is for End User's use only and may not be sold, redistributed or otherwise used for commercial purposes. The above information is an  only.  It is not intended as medical advice for individual conditions or treatments. Talk to your doctor, nurse or pharmacist before following any medical regimen to see if it is safe and effective for you.

## 2017-04-04 NOTE — IP AVS SNAPSHOT
Current Discharge Medication List  
  
START taking these medications Dose & Instructions Dispensing Information Comments Morning Noon Evening Bedtime  
 amiodarone 400 mg tablet Commonly known as:  Wahl Robison Your last dose was: Your next dose is:    
   
   
 Dose:  400 mg Take 1 Tab by mouth two (2) times a day. Quantity:  62 Tab Refills:  3  
     
   
   
   
  
 dilTIAZem  mg ER capsule Commonly known as:  CARDIZEM CD Your last dose was: Your next dose is:    
   
   
 Dose:  120 mg Take 1 Cap by mouth daily. Quantity:  30 Cap Refills:  6  
     
   
   
   
  
 oxyCODONE-acetaminophen  mg per tablet Commonly known as:  PERCOCET 10 Replaces:  oxyCODONE-acetaminophen 5-325 mg per tablet Your last dose was: Your next dose is:    
   
   
 Dose:  1 Tab Take 1 Tab by mouth every four (4) hours as needed. Max Daily Amount: 6 Tabs. Quantity:  80 Tab Refills:  0  
     
   
   
   
  
 warfarin 2.5 mg tablet Commonly known as:  COUMADIN Your last dose was: Your next dose is:    
   
   
 Dose:  2.5 mg Take 1 Tab by mouth daily. Quantity:  31 Tab Refills:  prn CONTINUE these medications which have CHANGED Dose & Instructions Dispensing Information Comments Morning Noon Evening Bedtime  
 metoprolol tartrate 25 mg tablet Commonly known as:  LOPRESSOR What changed:   
- medication strength 
- how much to take - when to take this Your last dose was: Your next dose is:    
   
   
 Dose:  25 mg Take 1 Tab by mouth every twelve (12) hours. Quantity:  62 Tab Refills:  6 CONTINUE these medications which have NOT CHANGED Dose & Instructions Dispensing Information Comments Morning Noon Evening Bedtime  
 amLODIPine 10 mg tablet Commonly known as:  Job Dub Your last dose was: Your next dose is:    
   
   
 Dose:  10 mg Take 1 Tab by mouth daily. Quantity:  90 Tab Refills:  3  
     
   
   
   
  
 aspirin 81 mg chewable tablet Your last dose was: Your next dose is:    
   
   
 Dose:  81 mg Take 81 mg by mouth daily. Refills:  0  
     
   
   
   
  
 atorvastatin 40 mg tablet Commonly known as:  LIPITOR Your last dose was: Your next dose is:    
   
   
 Dose:  40 mg Take 1 Tab by mouth daily. Quantity:  90 Tab Refills:  0  
     
   
   
   
  
 avanafil 100 mg tablet Commonly known as:  MNZHQPJ Your last dose was: Your next dose is:    
   
   
 Dose:  100 mg Take 1 Tab by mouth as needed for Other. Take 1 tab 15-30 mins prior to sexual acitivity Quantity:  12 Tab Refills:  0  
     
   
   
   
  
 clopidogrel 75 mg Tab Commonly known as:  PLAVIX Your last dose was: Your next dose is:    
   
   
 Dose:  75 mg Take 1 Tab by mouth daily. Quantity:  30 Tab Refills:  11  
     
   
   
   
  
 cyanocobalamin 1,000 mcg tablet Commonly known as:  VITAMIN B-12 Your last dose was: Your next dose is:    
   
   
 Dose:  1000 mcg Take 1 Tab by mouth daily. Quantity:  90 Tab Refills:  3 DULoxetine 60 mg capsule Commonly known as:  CYMBALTA Your last dose was: Your next dose is:    
   
   
 Dose:  60 mg Take 1 Cap by mouth daily. Quantity:  30 Cap Refills:  0  
     
   
   
   
  
 hydroCHLOROthiazide 25 mg tablet Commonly known as:  HYDRODIURIL Your last dose was: Your next dose is:    
   
   
 Dose:  25 mg Take 1 Tab by mouth daily. Quantity:  90 Tab Refills:  3  
     
   
   
   
  
 losartan 100 mg tablet Commonly known as:  COZAAR Your last dose was: Your next dose is:    
   
   
 Dose:  100 mg Take 1 Tab by mouth daily. Quantity:  90 Tab Refills:  3 POTASSIUM PHOSPHATE PO Your last dose was: Your next dose is: Take  by mouth. Take as directed Refills:  0 STOP taking these medications   
 oxyCODONE-acetaminophen 5-325 mg per tablet Commonly known as:  PERCOCET Replaced by:  oxyCODONE-acetaminophen  mg per tablet Where to Get Your Medications These medications were sent to [address] [facility], [address] [facility] [address] [facility] [address] Hours:  24-hours Phone:  [phone]  
  amiodarone 400 mg tablet  
 dilTIAZem  mg ER capsule  
 metoprolol tartrate 25 mg tablet  
 warfarin 2.5 mg tablet Information on where to get these meds will be given to you by the nurse or doctor. ! Ask your nurse or doctor about these medications  
  oxyCODONE-acetaminophen  mg per tablet

## 2017-04-05 ENCOUNTER — APPOINTMENT (OUTPATIENT)
Dept: GENERAL RADIOLOGY | Age: 57
DRG: 253 | End: 2017-04-05
Attending: PHYSICIAN ASSISTANT
Payer: COMMERCIAL

## 2017-04-05 ENCOUNTER — APPOINTMENT (OUTPATIENT)
Dept: ULTRASOUND IMAGING | Age: 57
DRG: 253 | End: 2017-04-05
Attending: INTERNAL MEDICINE
Payer: COMMERCIAL

## 2017-04-05 LAB
ANION GAP BLD CALC-SCNC: 12 MMOL/L (ref 3–18)
APPEARANCE UR: CLEAR
ATRIAL RATE: 125 BPM
BACTERIA URNS QL MICRO: NEGATIVE /HPF
BILIRUB UR QL: NEGATIVE
BUN SERPL-MCNC: 35 MG/DL (ref 7–18)
BUN/CREAT SERPL: 9 (ref 12–20)
CALCIUM SERPL-MCNC: 7.4 MG/DL (ref 8.5–10.1)
CALCIUM SERPL-MCNC: 7.6 MG/DL (ref 8.5–10.1)
CALCULATED R AXIS, ECG10: 4 DEGREES
CALCULATED T AXIS, ECG11: -125 DEGREES
CHLORIDE SERPL-SCNC: 93 MMOL/L (ref 100–108)
CK MB CFR SERPL CALC: 1.8 % (ref 0–4)
CK MB SERPL-MCNC: 91.3 NG/ML (ref 5–25)
CK SERPL-CCNC: 5199 U/L (ref 39–308)
CO2 SERPL-SCNC: 20 MMOL/L (ref 21–32)
COLOR UR: YELLOW
CREAT SERPL-MCNC: 4 MG/DL (ref 0.6–1.3)
CREAT UR-MCNC: 50.9 MG/DL (ref 30–125)
DIAGNOSIS, 93000: NORMAL
DIGOXIN SERPL-MCNC: 0.5 NG/ML (ref 0.9–2)
EOSINOPHIL #/AREA URNS HPF: NORMAL /[HPF]
EPITH CASTS URNS QL MICRO: ABNORMAL /LPF (ref 0–5)
ERYTHROCYTE [DISTWIDTH] IN BLOOD BY AUTOMATED COUNT: 14.2 % (ref 11.6–14.5)
GLUCOSE BLD STRIP.AUTO-MCNC: 113 MG/DL (ref 70–110)
GLUCOSE BLD STRIP.AUTO-MCNC: 119 MG/DL (ref 70–110)
GLUCOSE BLD STRIP.AUTO-MCNC: 123 MG/DL (ref 70–110)
GLUCOSE SERPL-MCNC: 112 MG/DL (ref 74–99)
GLUCOSE UR STRIP.AUTO-MCNC: NEGATIVE MG/DL
HCT VFR BLD AUTO: 26.9 % (ref 36–48)
HGB BLD-MCNC: 9.8 G/DL (ref 13–16)
HGB UR QL STRIP: ABNORMAL
KETONES UR QL STRIP.AUTO: NEGATIVE MG/DL
LEUKOCYTE ESTERASE UR QL STRIP.AUTO: ABNORMAL
MCH RBC QN AUTO: 30.2 PG (ref 24–34)
MCHC RBC AUTO-ENTMCNC: 36.4 G/DL (ref 31–37)
MCV RBC AUTO: 83 FL (ref 74–97)
NITRITE UR QL STRIP.AUTO: NEGATIVE
PH UR STRIP: 6 [PH] (ref 5–8)
PLATELET # BLD AUTO: 327 K/UL (ref 135–420)
PMV BLD AUTO: 9.6 FL (ref 9.2–11.8)
POTASSIUM SERPL-SCNC: 3.9 MMOL/L (ref 3.5–5.5)
PROT UR STRIP-MCNC: NEGATIVE MG/DL
PROT UR-MCNC: 18 MG/DL
PTH-INTACT SERPL-MCNC: 159.5 PG/ML (ref 14–72)
Q-T INTERVAL, ECG07: 330 MS
QRS DURATION, ECG06: 88 MS
QTC CALCULATION (BEZET), ECG08: 485 MS
RBC # BLD AUTO: 3.24 M/UL (ref 4.7–5.5)
RBC #/AREA URNS HPF: ABNORMAL /HPF (ref 0–5)
SODIUM SERPL-SCNC: 125 MMOL/L (ref 136–145)
SODIUM UR-SCNC: 47 MMOL/L (ref 20–110)
SP GR UR REFRACTOMETRY: 1.01 (ref 1–1.03)
TROPONIN I SERPL-MCNC: 0.04 NG/ML (ref 0–0.04)
UROBILINOGEN UR QL STRIP.AUTO: 0.2 EU/DL (ref 0.2–1)
VENTRICULAR RATE, ECG03: 130 BPM
WBC # BLD AUTO: 18.4 K/UL (ref 4.6–13.2)
WBC URNS QL MICRO: ABNORMAL /HPF (ref 0–4)

## 2017-04-05 PROCEDURE — 74011250637 HC RX REV CODE- 250/637: Performed by: SURGERY

## 2017-04-05 PROCEDURE — 80162 ASSAY OF DIGOXIN TOTAL: CPT | Performed by: INTERNAL MEDICINE

## 2017-04-05 PROCEDURE — 74011000258 HC RX REV CODE- 258: Performed by: SURGERY

## 2017-04-05 PROCEDURE — 82570 ASSAY OF URINE CREATININE: CPT | Performed by: INTERNAL MEDICINE

## 2017-04-05 PROCEDURE — 85027 COMPLETE CBC AUTOMATED: CPT | Performed by: SURGERY

## 2017-04-05 PROCEDURE — 51798 US URINE CAPACITY MEASURE: CPT

## 2017-04-05 PROCEDURE — 81001 URINALYSIS AUTO W/SCOPE: CPT | Performed by: INTERNAL MEDICINE

## 2017-04-05 PROCEDURE — 84300 ASSAY OF URINE SODIUM: CPT | Performed by: INTERNAL MEDICINE

## 2017-04-05 PROCEDURE — 84156 ASSAY OF PROTEIN URINE: CPT | Performed by: INTERNAL MEDICINE

## 2017-04-05 PROCEDURE — 74011250636 HC RX REV CODE- 250/636: Performed by: INTERNAL MEDICINE

## 2017-04-05 PROCEDURE — 83970 ASSAY OF PARATHORMONE: CPT | Performed by: INTERNAL MEDICINE

## 2017-04-05 PROCEDURE — 82962 GLUCOSE BLOOD TEST: CPT

## 2017-04-05 PROCEDURE — 87205 SMEAR GRAM STAIN: CPT | Performed by: INTERNAL MEDICINE

## 2017-04-05 PROCEDURE — 80048 BASIC METABOLIC PNL TOTAL CA: CPT | Performed by: SURGERY

## 2017-04-05 PROCEDURE — 76770 US EXAM ABDO BACK WALL COMP: CPT

## 2017-04-05 PROCEDURE — 74011250636 HC RX REV CODE- 250/636: Performed by: SURGERY

## 2017-04-05 PROCEDURE — 74011000250 HC RX REV CODE- 250: Performed by: SURGERY

## 2017-04-05 PROCEDURE — 82550 ASSAY OF CK (CPK): CPT | Performed by: INTERNAL MEDICINE

## 2017-04-05 PROCEDURE — 93005 ELECTROCARDIOGRAM TRACING: CPT

## 2017-04-05 PROCEDURE — 65620000000 HC RM CCU GENERAL

## 2017-04-05 PROCEDURE — 71010 XR CHEST PORT: CPT

## 2017-04-05 RX ORDER — SODIUM CHLORIDE 9 MG/ML
50 INJECTION, SOLUTION INTRAVENOUS CONTINUOUS
Status: DISCONTINUED | OUTPATIENT
Start: 2017-04-05 | End: 2017-04-06

## 2017-04-05 RX ORDER — DIGOXIN 125 MCG
0.12 TABLET ORAL
Status: DISCONTINUED | OUTPATIENT
Start: 2017-04-07 | End: 2017-04-07 | Stop reason: SDUPTHER

## 2017-04-05 RX ORDER — PHENYLEPHRINE HCL IN 0.9% NACL 30MG/250ML
10-100 PLASTIC BAG, INJECTION (ML) INTRAVENOUS
Status: DISCONTINUED | OUTPATIENT
Start: 2017-04-05 | End: 2017-04-18

## 2017-04-05 RX ADMIN — Medication 10 ML: at 08:45

## 2017-04-05 RX ADMIN — POTASSIUM CHLORIDE: 2 INJECTION, SOLUTION, CONCENTRATE INTRAVENOUS at 00:00

## 2017-04-05 RX ADMIN — HEPARIN SODIUM 5000 UNITS: 5000 INJECTION, SOLUTION INTRAVENOUS; SUBCUTANEOUS at 21:33

## 2017-04-05 RX ADMIN — DIGOXIN 0.25 MG: 0.25 TABLET ORAL at 08:43

## 2017-04-05 RX ADMIN — CLOPIDOGREL 75 MG: 75 TABLET, FILM COATED ORAL at 08:41

## 2017-04-05 RX ADMIN — Medication 10 ML: at 21:30

## 2017-04-05 RX ADMIN — HEPARIN SODIUM 5000 UNITS: 5000 INJECTION, SOLUTION INTRAVENOUS; SUBCUTANEOUS at 14:47

## 2017-04-05 RX ADMIN — METOPROLOL TARTRATE 25 MG: 25 TABLET ORAL at 14:47

## 2017-04-05 RX ADMIN — ASPIRIN 81 MG CHEWABLE TABLET 81 MG: 81 TABLET CHEWABLE at 08:41

## 2017-04-05 RX ADMIN — ATORVASTATIN CALCIUM 40 MG: 40 TABLET, FILM COATED ORAL at 08:42

## 2017-04-05 RX ADMIN — SODIUM CHLORIDE 50 ML/HR: 900 INJECTION, SOLUTION INTRAVENOUS at 14:00

## 2017-04-05 RX ADMIN — Medication 1000 MCG: at 08:41

## 2017-04-05 RX ADMIN — SODIUM CHLORIDE, SODIUM LACTATE, POTASSIUM CHLORIDE, AND CALCIUM CHLORIDE 1000 ML: 600; 310; 30; 20 INJECTION, SOLUTION INTRAVENOUS at 01:31

## 2017-04-05 RX ADMIN — Medication 10 ML: at 14:43

## 2017-04-05 RX ADMIN — DULOXETINE HYDROCHLORIDE 60 MG: 60 CAPSULE, DELAYED RELEASE ORAL at 08:42

## 2017-04-05 RX ADMIN — HEPARIN SODIUM 5000 UNITS: 5000 INJECTION, SOLUTION INTRAVENOUS; SUBCUTANEOUS at 05:11

## 2017-04-05 RX ADMIN — METOPROLOL TARTRATE 25 MG: 25 TABLET ORAL at 21:33

## 2017-04-05 RX ADMIN — METOPROLOL TARTRATE 25 MG: 25 TABLET ORAL at 05:11

## 2017-04-05 RX ADMIN — CEFAZOLIN SODIUM 2 G: 2 SOLUTION INTRAVENOUS at 03:38

## 2017-04-05 NOTE — PROGRESS NOTES
Admit Date: 2017    POD 1 Day Post-Op    Procedure:  Procedure(s) with comments:  RIGHT AXILLO-FEMORAL BYPASS - right axilla to femoral area    Subjective:     Patient stating he can't really tell much difference in pain to feet since surgery  He does have known distal disease, worse on the right  Nursing has reported increased heart rate and low BPs. Had made a call to cardiology team and was given initial orders but they had not been consulted    Objective:     Blood pressure 97/64, pulse (!) 118, temperature 97.5 °F (36.4 °C), resp. rate 16, height 5' 10\" (1.778 m), weight 170 lb (77.1 kg), SpO2 94 %. Temp (24hrs), Av.8 °F (36.6 °C), Min:97.5 °F (36.4 °C), Max:98.2 °F (36.8 °C)      Physical Exam:    A/O x 3, NAD. Vitals noted  Right foot still with duskiness but nurse who had him out of OR yesterday does state color is improved this morning  Minimal signals in right foot, but moves foot well. Good flow in left.  No edema    Labs:   Recent Results (from the past 24 hour(s))   POC CG8I    Collection Time: 17 11:35 AM   Result Value Ref Range    Device: VENT      pH (POC) 7.207 (LL) 7.35 - 7.45      pCO2 (POC) 48.8 (H) 35.0 - 45.0 MMHG    pO2 (POC) 162 (H) 80 - 100 MMHG    HCO3 (POC) 19.4 (L) 22 - 26 MMOL/L    sO2 (POC) 99 (H) 92 - 97 %    Base deficit (POC) 8 mmol/L    Allens test (POC) YES      Site LEFT RADIAL      Specimen type (POC) ARTERIAL      Sodium (POC) 124 (L) 136 - 145 MMOL/L    Potassium (POC) 3.2 (L) 3.5 - 5.5 MMOL/L    Glucose (POC) 158 (H) 74 - 106 MG/DL    Calcium, ionized (POC) 0.98 (L) 1.12 - 1.32 MMOL/L    Hematocrit (POC) 32 (L) 36 - 49 %    Hemoglobin (POC) 10.9 (L) 12 - 16 G/DL    Performed by Meridee Cape, POC    Collection Time: 17  3:48 PM   Result Value Ref Range    Glucose (POC) 176 (H) 70 - 165 mg/dL   METABOLIC PANEL, BASIC    Collection Time: 17  7:00 PM   Result Value Ref Range    Sodium 126 (L) 136 - 145 mmol/L    Potassium 3.4 (L) 3.5 - 5.5 mmol/L    Chloride 91 (L) 100 - 108 mmol/L    CO2 23 21 - 32 mmol/L    Anion gap 12 3.0 - 18 mmol/L    Glucose 128 (H) 74 - 99 mg/dL    BUN 29 (H) 7.0 - 18 MG/DL    Creatinine 3.78 (H) 0.6 - 1.3 MG/DL    BUN/Creatinine ratio 8 (L) 12 - 20      GFR est AA 20 (L) >60 ml/min/1.73m2    GFR est non-AA 16 (L) >60 ml/min/1.73m2    Calcium 7.1 (L) 8.5 - 10.1 MG/DL   CBC W/O DIFF    Collection Time: 04/04/17  7:00 PM   Result Value Ref Range    WBC 17.5 (H) 4.6 - 13.2 K/uL    RBC 3.26 (L) 4.70 - 5.50 M/uL    HGB 9.8 (L) 13.0 - 16.0 g/dL    HCT 27.1 (L) 36.0 - 48.0 %    MCV 83.1 74.0 - 97.0 FL    MCH 30.1 24.0 - 34.0 PG    MCHC 36.2 31.0 - 37.0 g/dL    RDW 14.0 11.6 - 14.5 %    PLATELET 393 036 - 902 K/uL    MPV 9.4 9.2 - 62.1 FL   METABOLIC PANEL, BASIC    Collection Time: 04/05/17  4:32 AM   Result Value Ref Range    Sodium 125 (L) 136 - 145 mmol/L    Potassium 3.9 3.5 - 5.5 mmol/L    Chloride 93 (L) 100 - 108 mmol/L    CO2 20 (L) 21 - 32 mmol/L    Anion gap 12 3.0 - 18 mmol/L    Glucose 112 (H) 74 - 99 mg/dL    BUN 35 (H) 7.0 - 18 MG/DL    Creatinine 4.00 (H) 0.6 - 1.3 MG/DL    BUN/Creatinine ratio 9 (L) 12 - 20      GFR est AA 19 (L) >60 ml/min/1.73m2    GFR est non-AA 15 (L) >60 ml/min/1.73m2    Calcium 7.4 (L) 8.5 - 10.1 MG/DL   CBC W/O DIFF    Collection Time: 04/05/17  4:32 AM   Result Value Ref Range    WBC 18.4 (H) 4.6 - 13.2 K/uL    RBC 3.24 (L) 4.70 - 5.50 M/uL    HGB 9.8 (L) 13.0 - 16.0 g/dL    HCT 26.9 (L) 36.0 - 48.0 %    MCV 83.0 74.0 - 97.0 FL    MCH 30.2 24.0 - 34.0 PG    MCHC 36.4 31.0 - 37.0 g/dL    RDW 14.2 11.6 - 14.5 %    PLATELET 679 405 - 751 K/uL    MPV 9.6 9.2 - 11.8 FL   GLUCOSE, POC    Collection Time: 04/05/17  7:54 AM   Result Value Ref Range    Glucose (POC) 119 (H) 70 - 110 mg/dL         Assessment:     Active Problems:     Aortoiliac occlusive disease ([facility] [address] (1/25/2017)      Atherosclerosis of native artery of both lower extremities [facility] claudication [facility] (1/25/2017)      PAD (peripheral artery disease) ([facility] [address] (1/25/2017)        Plan/Recommendations/Medical Decision Making: Will ask for consult and cardiology assistance with HR/BP  May have improvement in right foot symptoms with improved BP.  Discussed with Dr Aliyah Pantoja, and will just have to monitor for hopeful improvement  With current hemodynamics, continue ICU status for now  Andrea Desai to get OOB if pt wants

## 2017-04-05 NOTE — CONSULTS
[facility] - Consult Note    Consultation request by Cosme Homans, MD for advice/opinion related to evaluating Atheroscler of native artery of both legs with intermit claudication ([facility] [address] [I70.213]    Date of  Admission: 4/4/2017  6:27 AM   Primary Care Physician:  Racquel Unger MD     Assessment:     Patient Active Problem List   Diagnosis Code    Hypertension I10    Vitamin B12 deficiency E53.8    Low back pain M54.5    DDD (degenerative disc disease), lumbar M51.36    Erectile dysfunction N52.9    Lumbar neuritis M54.16    Spinal stenosis, lumbar region, without neurogenic claudication M48.06    Hereditary peripheral neuropathy G60.9    Spinal stenosis, lumbar M48.06    Aortic valve stenosis I35.0    Hereditary and idiopathic neuropathy, unspecified G60.9    Radiculopathy, lumbar region M54.16    Aortoiliac occlusive disease ([address] I74.09    Atherosclerosis of native artery of both lower extremities with intermittent claudication ([address] I70.213    PAD (peripheral artery disease) ([address] I73.9    CAD (coronary artery disease) I25.10     - Aortoiliac occlusive disease s/p R axillary fem bypass 4/4/17  - Known Atrial Fibrillation w/ RVR; -150s; currently asymptomatic  - Acute Kidney Injury; Cr 4  - s/p cath 3/15/17 with drug eluding stents x3  - asymptomatic carotid artery disease  - Aortic stenosis; aysmptomatic  - Essential Hypertension  - Hoarse voice; over the past month  - Weight loss; 20lbs over the past month  - ECHO 2/24/17 EF 35-40% mod. idffuse hypokinesis     Plan:     - Patient currently asymptomatic with h/o afib w/ RVR. Outpatient visit noted afib w/ RVR , subsequently started on digoxin. - Patient currently euvolemic; encourage PO fluid intake and increase IVF. - Neosynephrine to maintain MAP >60.  - Repeat CXR  - Recheck dig level; patient may be subtherapeutic and need med adjustment.   - Continue Lipitor, Cozaar, ASA, Plavix, Norvasc, Lopressor, HCTZ  - Recheck TSH; last checked eurthyroid 2015  - Consider nephrology consult to manage MAYRA     History of Present Illness: This is a 59 y.o. male with h/o CAD s/p DESx3, severe PVD s/p aortofem bypass, known afib w/ RVR, HTN, AS admitted for right axillo-femoral bypass to address his aortoiliac occlusive disease. Patient states chronic low back pain and hoarsness and a weight loss of 20lbs over the last month. Cardiac risk factors: smoking, hyptension, male, history of CAD      Review of Symptoms:  Except as stated above include:  Constitutional:  negative  Respiratory:  negative  Cardiovascular:  negative  Gastrointestinal: negative  Genitourinary:  negative  Musculoskeletal:  Negative  Neurological:  Negative  Dermatological:  Negative  Endocrinological: Negative  Psychological:  Negative    Pertinent items are noted in HPI.      Past Medical History:     Past Medical History:   Diagnosis Date    A-fib ([facility] [address]     Abnormal EKG     CAD S/P percutaneous coronary angioplasty 03/2017    Essential hypertension     Lumbar canal stenosis 05/04/2015    Dr. Donovan Fontanez PVD (peripheral vascular disease) [facility])          Social History:     Social History     Social History    Marital status: LEGALLY      Spouse name: N/A    Number of children: N/A    Years of education: N/A     Social History Main Topics    Smoking status: Former Smoker    Smokeless tobacco: Never Used      Comment: quit in 2011    Alcohol use 1.2 oz/week     2 Cans of beer per week      Comment: 10 cans of beer a month    Drug use: Yes     Special: Marijuana      Comment: occasionally    Sexual activity: Yes     Partners: Female     Other Topics Concern    None     Social History Narrative        Family History:     Family History   Problem Relation Age of Onset    Heart Disease Father         Medications:   No Known Allergies     Current Facility-Administered Medications   Medication Dose Route Frequency    sodium chloride (NS) flush 5-10 mL  5-10 mL IntraVENous PRN    glucose chewable tablet 16 g  4 Tab Oral PRN    glucagon (GLUCAGEN) injection 1 mg  1 mg IntraMUSCular PRN    dextrose (D50W) injection syrg 12.5-25 g  25-50 mL IntraVENous PRN    fentaNYL citrate (PF) injection 50 mcg  50 mcg IntraVENous Q10MIN PRN    HYDROmorphone (PF) (DILAUDID) injection 0.5 mg  0.5 mg IntraVENous PRN    amLODIPine (NORVASC) tablet 10 mg  10 mg Oral DAILY    aspirin chewable tablet 81 mg  81 mg Oral DAILY    atorvastatin (LIPITOR) tablet 40 mg  40 mg Oral DAILY    clopidogrel (PLAVIX) tablet 75 mg  75 mg Oral DAILY    cyanocobalamin tablet 1,000 mcg  1,000 mcg Oral DAILY    digoxin (LANOXIN) tablet 0.25 mg  0.25 mg Oral DAILY    DULoxetine (CYMBALTA) capsule 60 mg  60 mg Oral DAILY    hydroCHLOROthiazide (HYDRODIURIL) tablet 25 mg  25 mg Oral DAILY    losartan (COZAAR) tablet 100 mg  100 mg Oral DAILY    sodium chloride (NS) flush 5-10 mL  5-10 mL IntraVENous Q8H    sodium chloride (NS) flush 5-10 mL  5-10 mL IntraVENous PRN    lactated ringers infusion  100 mL/hr IntraVENous CONTINUOUS    acetaminophen (TYLENOL) tablet 650 mg  650 mg Oral Q4H PRN    oxyCODONE-acetaminophen (PERCOCET) 5-325 mg per tablet 1 Tab  1 Tab Oral Q4H PRN    morphine injection 2 mg  2 mg IntraVENous Q2H PRN    naloxone (NARCAN) injection 0.4 mg  0.4 mg IntraVENous PRN    ondansetron (ZOFRAN) injection 4 mg  4 mg IntraVENous Q4H PRN    diphenhydrAMINE (BENADRYL) injection 12.5 mg  12.5 mg IntraVENous Q4H PRN    docusate sodium (COLACE) capsule 100 mg  100 mg Oral BID    heparin (porcine) injection 5,000 Units  5,000 Units SubCUTAneous Q8H    metoprolol tartrate (LOPRESSOR) tablet 25 mg  25 mg Oral Q8H         Physical Exam:     Visit Vitals    BP 97/64    Pulse (!) 128    Temp 97.9 °F (36.6 °C)    Resp 18    Ht 5' 10\" (1.778 m)    Wt 77.1 kg (170 lb)    SpO2 99%    BMI 24.39 kg/m2     BP Readings from Last 3 Encounters:   04/05/17 97/64 17 100/70   17 102/68     Pulse Readings from Last 3 Encounters:   17 (!) 128   17 98   17 98     Wt Readings from Last 3 Encounters:   17 77.1 kg (170 lb)   17 77.1 kg (170 lb)   17 77.1 kg (170 lb)       General:  {general:24728::\"alert\",\"cooperative\",\"no distress\",\"appears stated age\"}  Neck:  {Exam; neck:22626}  Lungs:  {Exam; lun::\"clear to auscultation bilaterally\"}  Heart:  {Exam; heart:5510::\"regular rate and rhythm, S1, S2 normal, no murmur, click, rub or gallop\"}  Abdomen:  {Exam; abdomen:5259::\"abdomen is soft without significant tenderness, masses, organomegaly or guarding\"}  Extremities:  {Exam; extremity:5109::\"extremities normal, atraumatic, no cyanosis or edema\"}  Skin: {Skin brief exam:04200::\"Warm and dry. no hyperpigmentation, vitiligo, or suspicious lesions\"}  Neuro: {Neuro limited:34014::\"alert, oriented x3, affect appropriate\",\"no focal neurological deficits\",\"moves all extremities well\",\"no involuntary movements\",\"reflexes at knee and ankle intact\"}  Psych: {Psych:29365}     Data Review:     Recent Labs      17   0432  17   1900   WBC  18.4*  17.5*   HGB  9.8*  9.8*   HCT  26.9*  27.1*   PLT  327  301     Recent Labs      17   0432  17   1900   NA  125*  126*   K  3.9  3.4*   CL  93*  91*   CO2  20*  23   GLU  112*  128*   BUN  35*  29*   CREA  4.00*  3.78*   CA  7.4*  7.1*       Results for orders placed or performed in visit on 17   AMB POC EKG ROUTINE W/ 12 LEADS, INTER & REP    Narrative    Atrial fibrillation. Cannot rule out anteroseptal myocardial infarction age-indeterminate.   Nonspecific ST-T changes   Results for orders placed or performed during the hospital encounter of 01/20/15   EKG, 12 LEAD, INITIAL   Result Value Ref Range    Ventricular Rate 88 BPM    Atrial Rate 88 BPM    P-R Interval 148 ms    QRS Duration 86 ms    Q-T Interval 368 ms    QTC Calculation (Bezet) 445 ms    Calculated P Axis 63 degrees    Calculated R Axis 3 degrees    Calculated T Axis 20 degrees    Diagnosis       Normal sinus rhythm  Possible Left atrial enlargement  Nonspecific ST abnormality  Abnormal ECG  No previous ECGs available  Confirmed by Avril King MD, Aaron Mcdonnell ([id number]) on 1/20/2015 2:22:43 PM       All Cardiac Markers in the last 24 hours:  No results found for: CPK, CKMMB, CKMB, RCK3, CKMBT, CKNDX, CKND1, LEO, TROPT, TROIQ, DEWAYNE, TROPT, TNIPOC, BNP, BNPP    Last Lipid:    Lab Results   Component Value Date/Time    Cholesterol, total 157 12/29/2015 07:18 AM    HDL Cholesterol 28 12/29/2015 07:18 AM    LDL, calculated 112.4 12/29/2015 07:18 AM    Triglyceride 83 12/29/2015 07:18 AM    CHOL/HDL Ratio 5.6 12/29/2015 07:18 AM       Signed By: Brain Rosa PA-S    April 5, 2017

## 2017-04-05 NOTE — ROUTINE PROCESS
[id number] Notified Dr. Aliyah Pantoja of pt HR in the 120-130, lopressor given with little affect urine output 30cc/hr, b/p borderline SBP 90-low 100s, IVF LR @100, new orders rec'd   0730 Bedside and Verbal shift change report given to Marley Tam (oncoming nurse) by Kinjal Calderon RN (offgoing nurse). Report included the following information Procedure Summary, Intake/Output, MAR, Recent Results and Cardiac Rhythm Afib.

## 2017-04-05 NOTE — CONSULTS
Consult Note  Consult requested by: dr Sheldon Escoto is a 59 y.o. male [address] who is being seen on consult for arf  No chief complaint on file. Admission diagnosis: <principal problem not specified>     HPI: 59 y o white male admitted for peripheral vascular surgery. I was asked to evaluate for arf. pt had normal renal function prior to cardiac cath 3/15/17.serum creatinine post cardiac cath was up to 1.6.no labs performed til after his surgery yesterday,showing creatinine up to 3. 6.he was on numerous bp meds as outpatient,including cozaar and hctz.denies taking any nsaids. post op ,he was noted to be hypotensive and tachycardic,started on pressors and digoxin. has numerous medical problems,including as,afib,cad,pvd,htn.had nl renal arteries and nl kidneys on ct scan performed back in 1/2017  Past Medical History:   Diagnosis Date    A-fib [facility])     Abnormal EKG     CAD S/P percutaneous coronary angioplasty 03/2017    Essential hypertension     Lumbar canal stenosis 05/04/2015    Dr. Keyur Winkler PVD (peripheral vascular disease) [facility])       Past Surgical History:   Procedure Laterality Date    CARDIAC CATHETERIZATION  3/8/2017         CARDIAC CATHETERIZATION  3/15/2017         CORONARY ARTERY ANGIOGRAM  3/8/2017         CORONARY STENT EA ADDL VESSEL  3/15/2017         CORONARY STENT SINGLE W/PTCA  3/15/2017         HX COLONOSCOPY  07/23/2015    polyps repeat 2020 5 years [facility]    LV ANGIOGRAPHY  3/8/2017            Social History     Social History    Marital status: LEGALLY      Spouse name: N/A    Number of children: N/A    Years of education: N/A     Occupational History    Not on file.      Social History Main Topics    Smoking status: Former Smoker    Smokeless tobacco: Never Used      Comment: quit in 2011    Alcohol use 1.2 oz/week     2 Cans of beer per week      Comment: 10 cans of beer a month    Drug use: Yes     Special: Marijuana      Comment: occasionally    Sexual activity: Yes     Partners: Female     Other Topics Concern    Not on file     Social History Narrative       Family History   Problem Relation Age of Onset    Heart Disease Father      No Known Allergies     Home Medications:     Prior to Admission Medications   Prescriptions Last Dose Informant Patient Reported? Taking? DIGITEK 250 mcg tablet Not Taking at Unknown time  Yes No   Sig: take 1 tablet by mouth twice a day then 1 tablet by mouth once daily   DULoxetine (CYMBALTA) 60 mg capsule 3/31/2017  No No   Sig: Take 1 Cap by mouth daily. POTASSIUM PHOS,M-BASIC-D-BASIC (POTASSIUM PHOSPHATE PO) 4/4/2017 at Unknown time  Yes Yes   Sig: Take  by mouth. Take as directed   amLODIPine (NORVASC) 10 mg tablet 4/4/2017 at Unknown time  No Yes   Sig: Take 1 Tab by mouth daily. aspirin 81 mg chewable tablet 4/4/2017 at Unknown time  Yes Yes   Sig: Take 81 mg by mouth daily. atorvastatin (LIPITOR) 40 mg tablet 4/4/2017 at Unknown time  No Yes   Sig: Take 1 Tab by mouth daily. avanafil (STENDRA) 100 mg tablet Not Taking at Unknown time  No No   Sig: Take 1 Tab by mouth as needed for Other. Take 1 tab 15-30 mins prior to sexual acitivity   clopidogrel (PLAVIX) 75 mg tab 4/4/2017 at Unknown time  No Yes   Sig: Take 1 Tab by mouth daily. cyanocobalamin (VITAMIN B-12) 1,000 mcg tablet 4/4/2017 at Unknown time  No Yes   Sig: Take 1 Tab by mouth daily. hydrochlorothiazide (HYDRODIURIL) 25 mg tablet 4/4/2017 at Unknown time  No Yes   Sig: Take 1 Tab by mouth daily. losartan (COZAAR) 100 mg tablet 4/4/2017 at Unknown time  No Yes   Sig: Take 1 Tab by mouth daily. metoprolol tartrate (LOPRESSOR) 50 mg tablet 4/4/2017 at Unknown time  No Yes   Sig: Take 1 Tab by mouth three (3) times daily. oxyCODONE-acetaminophen (PERCOCET) 5-325 mg per tablet 3/31/2017  No No   Sig: Take 1 Tab by mouth every four (4) hours as needed for Pain. Max Daily Amount: 6 Tabs.       Facility-Administered Medications: None       Current Facility-Administered Medications   Medication Dose Route Frequency    PHENYLephrine 30 mg in 0.9% sodium chloride 250 mL (ANISH-SYNEPHRINE) infusion   mcg/min IntraVENous TITRATE    0.9% sodium chloride infusion  50 mL/hr IntraVENous CONTINUOUS    sodium chloride (NS) flush 5-10 mL  5-10 mL IntraVENous PRN    glucose chewable tablet 16 g  4 Tab Oral PRN    glucagon (GLUCAGEN) injection 1 mg  1 mg IntraMUSCular PRN    dextrose (D50W) injection syrg 12.5-25 g  25-50 mL IntraVENous PRN    fentaNYL citrate (PF) injection 50 mcg  50 mcg IntraVENous Q10MIN PRN    HYDROmorphone (PF) (DILAUDID) injection 0.5 mg  0.5 mg IntraVENous PRN    aspirin chewable tablet 81 mg  81 mg Oral DAILY    atorvastatin (LIPITOR) tablet 40 mg  40 mg Oral DAILY    clopidogrel (PLAVIX) tablet 75 mg  75 mg Oral DAILY    cyanocobalamin tablet 1,000 mcg  1,000 mcg Oral DAILY    digoxin (LANOXIN) tablet 0.25 mg  0.25 mg Oral DAILY    DULoxetine (CYMBALTA) capsule 60 mg  60 mg Oral DAILY    sodium chloride (NS) flush 5-10 mL  5-10 mL IntraVENous Q8H    sodium chloride (NS) flush 5-10 mL  5-10 mL IntraVENous PRN    acetaminophen (TYLENOL) tablet 650 mg  650 mg Oral Q4H PRN    oxyCODONE-acetaminophen (PERCOCET) 5-325 mg per tablet 1 Tab  1 Tab Oral Q4H PRN    morphine injection 2 mg  2 mg IntraVENous Q2H PRN    naloxone (NARCAN) injection 0.4 mg  0.4 mg IntraVENous PRN    ondansetron (ZOFRAN) injection 4 mg  4 mg IntraVENous Q4H PRN    diphenhydrAMINE (BENADRYL) injection 12.5 mg  12.5 mg IntraVENous Q4H PRN    docusate sodium (COLACE) capsule 100 mg  100 mg Oral BID    heparin (porcine) injection 5,000 Units  5,000 Units SubCUTAneous Q8H    metoprolol tartrate (LOPRESSOR) tablet 25 mg  25 mg Oral Q8H       Review of Systems:   A comprehensive review of systems was negative except for that written in the HPI.   Data Review:    Labs: Results:       Chemistry Recent Labs      04/05/17   5919 04/04/17   1900   GLU  112*  128*   NA  125*  126*   K  3.9  3.4*   CL  93*  91*   CO2  20*  23   BUN  35*  29*   CREA  4.00*  3.78*   CA  7.4*  7.1*   AGAP  12  12   BUCR  9*  8*      PTH  Lab Results   Component Value Date/Time    Calcium 7.4 04/05/2017 04:32 AM      CBC w/Diff Recent Labs      04/05/17   0432  04/04/17   1900   WBC  18.4*  17.5*   RBC  3.24*  3.26*   HGB  9.8*  9.8*   HCT  26.9*  27.1*   PLT  327  301      Coagulation No results for input(s): PTP, INR, APTT in the last 72 hours. No lab exists for component: INREXT    Iron/Ferritin No results for input(s): IRON in the last 72 hours. No lab exists for component: TIBCCALC   BNP No results for input(s): BNPP in the last 72 hours. Cardiac Enzymes No results for input(s): CPK, CKND1, LEO in the last 72 hours. No lab exists for component: CKRMB, TROIP   Liver Enzymes No results for input(s): TP, ALB, TBIL, AP, SGOT, GPT in the last 72 hours. No lab exists for component: DBIL   Thyroid Studies Lab Results   Component Value Date/Time    TSH 1.580 07/16/2015 12:00 AM        Urinalysis Lab Results   Component Value Date/Time    pH (UA) CANCELED 01/20/2015 12:00 AM    Ketone CANCELED 01/20/2015 12:00 AM         IMAGES:   XR Results (maximum last 3): Results from [place] encounter on 03/03/17   XR CHEST PA LAT   Narrative CHEST PA AND LATERAL:    CPT CODE: 29954    COMPARISON: none    INDICATION: Preop    FINDINGS: The heart is enlarged. The aorta is tortuous and calcified. Lungs are  clear with no infiltrates. No pleural effusions are seen. Moderate degenerative  changes are seen involving the spine. Impression Impression:    Cardiomegaly with no evidence of active pulmonary disease. Results from [place] encounter on 10/25/16   XR SPINE LUMB 2 OR 3 V   Narrative LUMBAR SPINE    CPT CODE: 40741    HISTORY: Low back pain. FINDINGS: Three views. There is mild straightening of the normal cervical  lordosis. Alignment is otherwise normal.  There is no fracture or significant  bony abnormality. There is mild-to-moderate narrowing at L4-5 and mild  narrowing at L3-4 and L2-3. Small anterior endplate osteophytes are seen at  multiple levels with large lateral osteophytes at L2-3. The soft tissues are  unremarkable. There is no significant change from 20 January 2015. Impression IMPRESSION:    Mild straightening of the normal lumbar lordosis. Mild-to-moderate narrowing  several levels in the lumbar spine including L2-L3, L3-4 and L4-5. Large  lateral osteophytes at L2-3. Results from [place] encounter on 09/09/15   XR TIB/FIB LT   Narrative Procedure:  Left tibia and fibula. Indication:  Leg pain in the middle of shin. Comparison:  None. Findings:  Frontal and lateral views. No evidence of acute fracture or  subluxation is identified. No bony erosion or periosteal reaction. Impression Impression:    No acute osseous findings. CT Results (maximum last 3): Results from [place] encounter on 01/24/17   CTA ABD ART W RUNOFF W WO CONT   Narrative CTA of the abdominal aorta with runoff of the lower extremities. HISTORY: Aortoiliac occlusion seen on MRI. COMPARISON: Lumbar spine MRI dated November 25, 2016. TECHNIQUE: A CTA of the abdominal aorta was performed with lower extremity  runoff. Images were obtained in arterial phase. Multiplanar, 3-D, and MIPS  reconstructions were generated on a separate workstation and archived. Dose  reduction techniques used: Automated exposure control, adjustment of the mAs  and/or KVP according to the patient size, standardized low-dose protocol, and/or  iterative reconstruction technique. CTA findings: The abdominal aorta is normal in caliber to the level of the renal arteries. The  celiac artery is patent. The SMA is patent with atherosclerosis near its origin.   The renal arteries are patent with atherosclerotic irregularity near their  origin, but no flow-limiting stenosis. The PRIETO is patent. Distal to the renal arteries, there is an abdominal aortic aneurysm measuring  3.0 cm x 2.8 cm. There is extensive soft plaque consistent with mural thrombus. There is likely high-grade stenosis in the distal aorta with only approximately  8 mm of an opacified lumen. There is high-grade stenosis at the origin of the right common iliac artery with  multifocal irregularities and mild stenosis in the midportion. The right  external iliac artery is patent with multifocal irregularities and moderate  stenosis just above the inguinal ligament. The right common femoral artery is  patent with irregularity and mild stenosis proximally. The right SFA is patent. There is extensive irregularity of the right SFA with multiple areas of mild  stenosis. The right popliteal artery is patent. There is severe stenosis at the  level of the femoral condyle. There is moderate stenosis just below the level of  the knee. The tibioperoneal trunk is patent. The anterior tibial artery is  patent with multifocal irregularities. The posterior tibial artery is patent  with multifocal irregularities. The peroneal artery is patent with multifocal  irregularities. This represents a three-vessel runoff to the right foot. The left common iliac artery is likely occluded at its origin. The left external  iliac artery remains occluded to the level of the left common femoral artery  which is patent via collateral filling. There is irregularity of the left common  femoral artery without significant stenosis. The left SFA is patent with  multifocal irregularities which result in severe stenosis at multiple portions  of the mid SFA. The left popliteal artery is patent with moderate stenosis at  the level of the femoral condyles. The tibial peroneal trunk is patent with  multifocal irregularities.  The anterior tibial, posterior tibial, and peroneal  arteries are patent proximally with multifocal irregularities. The peroneal  artery is not opacified at the level of the ankle. There is at least a  two-vessel runoff to the left foot. Nonvascular findings: There are small bilateral pleural effusions. There is  associated atelectasis. The heart is enlarged in size. Liver: Unremarkable. Gallbladder: Unremarkable. Spleen: Unremarkable. Adrenal glands: Unremarkable. Pancreas: Mildly atrophic. Kidneys: Unremarkable. Bowel: No bowel obstruction. No focal bowel wall thickening is identified. There  is mild colonic diverticulosis without acute diverticulitis. Nodes: Nonenlarged by size criteria. Bladder: Unremarkable. Prostate: Mildly enlarged. Bilateral scrotal hydroceles, right greater than left. Bilateral fat containing  inguinal hernias with a small amount of fluid in the right inguinal hernia as  well. Evaluation of the soft tissues of the lower extremities reveals atrophic  musculature, but no other focal abnormality appreciated. Impression Impression:  1. Significant distal aortic stenosis with an infrarenal abdominal aorta and  extensive atherosclerotic soft plaque/mural thrombus with a minimal luminal  diameter of approximately 8 mm. 2.  Significant arterial inflow disease of the left leg with occlusion of the  left common iliac artery at its origin with the left external iliac artery also  remaining largely occluded. The left common femoral artery/distal left external  iliac artery reconstitutes via collaterals. There is extensive multifocal  atherosclerotic irregularity and stenoses in the left SFA, popliteal artery, and  calf arteries as above. There is at least a two-vessel runoff to the left foot. The peroneal artery at the level of the ankle is nonopacified, which could be  due to occlusion or contrast bolus timing.     3.  Significant arterial inflow disease of the right leg with high-grade  stenosis near the origin of the right common iliac artery with additional mild  stenosis in its midportion. The right external iliac artery is patent with  moderate stenosis just above the inguinal ligament. 4.  Additional right lower extremity arterial stenoses as described above with a  three-vessel runoff to the right foot. 5.  Small bilateral pleural effusions. 6.  Small bilateral scrotal hydroceles. MRI Results (maximum last 3): Results from [place] encounter on 11/25/16   MRI LUMB SPINE WO CONT   Narrative EXAM: Lumbar MRI without contrast    CLINICAL INDICATION: Low back pain extending into the left pleura showing    TECHNIQUE: MRI of the lumbar spine without contrast obtained. Multiplanar  multisequence MR images of the lumbar spine obtained. IV Contrast: None    COMPARISON: MRI dated 4/28/2015 and plain film dated 10/25/2016    FINDINGS: All numbering assumes 5 lumbar type vertebrae. The alignment is  unremarkable. . The marrow signal is normal. The cord terminates at L1. No cord  signal abnormalities appreciated. There is no evidence of aneurysm. However, there is significant canal stenosis  which is progressed significantly since 2015. There is likely a 65-70% stenosis  in the infrarenal abdominal aorta. The left common iliac artery is potentially  occluded proximally. There are, this may have been present on the prior  examination. No paraaortic adenopathy appreciated. The visualized kidneys are  unremarkable. L1-L2 level: Mild degenerative changes are present. No significant canal or  neural foraminal stenosis. This is minimally progressed. L2-L3: Broad-based disc bulge with facet hypertrophy and ligamentum flavum  thickening. There is mild canal narrowing with mild to moderate bilateral neural  foraminal narrowing. This is similar to prior imaging. L3-L4: Mild broad-based disc bulge is present. Mild ligament flavum thickening  is present.  Mild canal narrowing with moderate bilateral neural foraminal  narrowing is noted. This is grossly unchanged. L4-L5: Facet hypertrophy with broad-based disc bulge and ligament flavum  thickening are present. Severe canal stenosis with severe left and moderate to  severe right neural foraminal narrowing are present. This is progressed. L5-S1: Disc osteophyte formation with central disc protrusion is present. No  significant canal stenosis appreciated. Moderate to severe left and moderate to  severe right neural foraminal narrowing are present. This is progressed since  prior imaging. Impression Impression:  1. Multilevel degenerative disc disease and facet arthropathy. 2.  Severe canal stenosis with severe left and moderate to severe right neural  foraminal narrowing which is mildly progressed since prior imaging. 3.   Moderate to severe bilateral neural foraminal narrowing at L5-S1 which is  progressed since prior imaging. 4.  Significant aortic stenosis which is progressed since prior imaging. There  is possible occlusion of the left common iliac artery which may been present  previously. Correlation with dedicated imaging of the aorta is recommended such  as CTA. Results from [place] encounter on 04/28/15   MRI LUMB SPINE WO CONT   Narrative MRI of the lumbar spine     CPT code: 85537    Comparisons: Radiographs January 20, 2013    Clinical information: Back pain, left greater than right leg pain      Technique: T1 weighted, T2 FSE with fat saturation, FSE inversion recovery  sagittal images are supplemented by T2 weighted with fat saturation and T1  weighted axial images. FINDINGS:      There is straightening of the normal lumbar lordosis. There is trace  retrolisthesis of L2 on L3 and L3 on L4. Multilevel small osteophytes are  present.  Minimal marrow edema in the posterior aspect of the L4 vertebral body,  extending into the posterior elements, likely degenerative/reactive. Mild likely  reactive marrow edema in the posterior elements of the L5 vertebral body. No  convincing acute fracture. >]     There is mild disc narrowing and mild desiccation at L2-3, L3-4, and L4-5. The conus terminates at the L1 level and appears normal.    Mild degenerative changes in the sacroiliac joints. Suspect atherosclerotic  disease of the aorta. Correlation of axial and sagittal images reveals the following:    At L1-L2: No significant disc pathology. Mild facet arthropathy. Canal is  patent. No significant foraminal narrowing. At L2-L3: Bulging of the posterolateral disc corners, with mild facet  arthropathy and ligamentous buckling. Prominent posterior epidural fat. Mild  narrowing of the central canal to approximately 8 mm. Mild right greater than  left foraminal narrowing. At L3-L4: Moderate circumferential disc bulge/osteophyte, with mild facet  arthropathy and ligamentous buckling. Prominent posterior epidural fat. Central  canal is narrowed to approximately 8 mm. Mild narrowing of the lateral recesses. Mild to moderate right greater than left foraminal narrowing. At L4-L5: Moderate posterior disc osteophyte complex with moderate facet  arthropathy and ligamentous buckling. Small facet joint effusions. Posterior  annular tear. The central canal is narrowed to approximately 6 mm with moderate  crowding of the nerve roots. Moderate narrowing of the lateral recesses. Moderate left greater than right foraminal narrowing. At L5-S1: Small posterior disc bulge, with mild facet arthropathy. Canal. Mild  left greater than right foraminal narrowing                   Impression IMPRESSION:  Multilevel degenerative changes as described above, most significantly at L4-5  where there is significant canal stenosis, lateral recess narrowing, and  bilateral foraminal narrowing. Nuclear Medicine Results (maximum last 3):   No results found for this or any previous visit.    [address] Results (maximum last 3): No results found for this or any previous visit. DEXA Results (maximum last 3): No results found for this or any previous visit. CIARA Results (maximum last 3): No results found for this or any previous visit. IR Results (maximum last 3): No results found for this or any previous visit. VAS/US Results (maximum last 3): Results from [place] encounter on 01/24/17   DUPLEX LOWER EXT ARTERY BILAT    PET Results (maximum last 3): No results found for this or any previous visit. No results found for this or any previous visit. @LASTPROCAMB([id number])    CULTURE:   )No results for input(s): SDES, CULT in the last 72 hours. No results for input(s): CULT in the last 72 hours. Physical Assessment:     Visit Vitals    BP 97/64    Pulse (!) 128    Temp 97.9 °F (36.6 °C)    Resp 18    Ht 5' 10\" (1.778 m)    Wt 77.1 kg (170 lb)    SpO2 99%    BMI 24.39 kg/m2     Last 3 Recorded Weights in this Encounter    03/30/17 1314 04/04/17 0722   Weight: 77.1 kg (170 lb) 77.1 kg (170 lb)       Intake/Output Summary (Last 24 hours) at 04/05/17 1352  Last data filed at 04/05/17 1335   Gross per 24 hour   Intake             1680 ml   Output              895 ml   Net              785 ml       Physial Exam:  General appearance: alert, cooperative, no distress, appears stated age  Skin: normal coloration and turgor, no rashes, no suspicious skin lesions noted. HEENT: Head; normocephalic, atraumatic. ANATOLIY. ENT- ENT exam normal, no neck nodes or sinus tenderness. Lungs: clear to auscultation bilaterally  Heart: regular rate and rhythm, S1, S2 normal, no murmur, click, rub or gallop  Abdomen: soft, non-tender. Bowel sounds normal. No masses,  no organomegaly  Extremities: extremities normal, atraumatic, no cyanosis or edema    PLAN / RECOMMENDATION:    Acute renal failure,reviewed medical records and labs. serum creatinine increased after the cardiac cath on 3/15/17. may have atheroembolic kidney injury related to cardiac cath ,exacerbated by arbs,diuretics and hypotension. for diagnosis,send spot urine for lytes,urinalysis,eosinophils,protein. check renal ultrasound. start iv ns .may need dialysis  Hyponatremia related to renal failure,start ns  Hypotension,keep systolic bp higher than 642 for renal perfusion  Afib,on digoxin. reduce dose and watch level closely with worsening renal function     Thank you for the consultation to participate in patient's care. I have personally discussed my plan with the referring physician.      Pa Franklin MD  April 5, 2017

## 2017-04-05 NOTE — CONSULTS
[facility] - Consult Note    Consultation request by Leslie Laguna MD for advice/opinion related to evaluating Atheroscler of native artery of both legs with intermit claudication ([facility] [address] [I70.213]    Date of  Admission: 4/4/2017  6:27 AM   Primary Care Physician:  Esmeralda Ahumada, MD     Assessment:     Patient Active Problem List   Diagnosis Code    Hypertension I10    Vitamin B12 deficiency E53.8    Low back pain M54.5    DDD (degenerative disc disease), lumbar M51.36    Erectile dysfunction N52.9    Lumbar neuritis M54.16    Spinal stenosis, lumbar region, without neurogenic claudication M48.06    Hereditary peripheral neuropathy G60.9    Spinal stenosis, lumbar M48.06    Aortic valve stenosis I35.0    Hereditary and idiopathic neuropathy, unspecified G60.9    Radiculopathy, lumbar region M54.16    Aortoiliac occlusive disease ([facility] [address] I74.09    Atherosclerosis of native artery of both lower extremities with intermittent claudication ([address] I70.213    PAD (peripheral artery disease) ([address] I73.9    CAD (coronary artery disease) I25.10       - A.fib w/RVR in setting of known persistent atrial fibrillation. Heart rate was elevated as outpatient as well. Asymptomatic.    - PAD, s/p aortoiliac occlusive disease s/p R axillary fem bypass 4/4/17  - Acute Kidney Injury; Cr 4  - Cardiomyopathy, ischemic. ECHO 2/24/17 EF 35-40% mod. idffuse hypokinesis  - s/p cath 3/15/17 with drug eluding stents to LCx and RCA  - Asymptomatic carotid artery disease  - Essential Hypertension  - Hoarse voice; over the past month  - Weight loss; 20lbs over the past month     Plan:     Known A.fib, rates poorly controlled prior to admission based on outpatient EKG's. Currently rates up to 150 bpm, no symptoms. Need to exclude infection/sepsis. Will plan cardiac enzymes, CXR, digoxin level, TSH. Abundio to keep MAP > 60 mmHg. Continue IVF. Does not appear fluid overloaded.   I would ask renal to see due to rising Cr and I will defer diuretics to them. History of Present Illness: This is a 59 y.o. male with h/o CAD s/p DESx3, severe PVD s/p aortofem bypass, known afib w/ RVR, HTN, AS admitted for right axillo-femoral bypass to address his aortoiliac occlusive disease. Patient states right leg pain consistent with prior PVD associated leg pain, chronic low back pain and hoarsness and a weight loss of 20lbs over the last month. Denies CP, SOB, abdominal pain, N/V, frequency, dysuria, hematuria, hematochezia. Cardiac risk factors: smoking, hypertension, male, history of CAD      Review of Symptoms:  Except as stated above include:  Constitutional:  negative  Respiratory:  negative  Cardiovascular:  negative  Gastrointestinal: negative  Genitourinary:  negative  Musculoskeletal:  Negative  Neurological:  Negative  Dermatological:  Negative  Endocrinological: Negative  Psychological:  Negative    Pertinent items are noted in HPI.      Past Medical History:     Past Medical History:   Diagnosis Date    A-fib [facility])     Abnormal EKG     CAD S/P percutaneous coronary angioplasty 03/2017    Essential hypertension     Lumbar canal stenosis 05/04/2015    Dr. Germain Hedrick PVD (peripheral vascular disease) [facility])          Social History:     Social History     Social History    Marital status: LEGALLY      Spouse name: N/A    Number of children: N/A    Years of education: N/A     Social History Main Topics    Smoking status: Former Smoker    Smokeless tobacco: Never Used      Comment: quit in 2011    Alcohol use 1.2 oz/week     2 Cans of beer per week      Comment: 10 cans of beer a month    Drug use: Yes     Special: Marijuana      Comment: occasionally    Sexual activity: Yes     Partners: Female     Other Topics Concern    None     Social History Narrative        Family History:     Family History   Problem Relation Age of Onset    Heart Disease Father         Medications:   No Known Allergies Current Facility-Administered Medications   Medication Dose Route Frequency    sodium chloride (NS) flush 5-10 mL  5-10 mL IntraVENous PRN    glucose chewable tablet 16 g  4 Tab Oral PRN    glucagon (GLUCAGEN) injection 1 mg  1 mg IntraMUSCular PRN    dextrose (D50W) injection syrg 12.5-25 g  25-50 mL IntraVENous PRN    fentaNYL citrate (PF) injection 50 mcg  50 mcg IntraVENous Q10MIN PRN    HYDROmorphone (PF) (DILAUDID) injection 0.5 mg  0.5 mg IntraVENous PRN    amLODIPine (NORVASC) tablet 10 mg  10 mg Oral DAILY    aspirin chewable tablet 81 mg  81 mg Oral DAILY    atorvastatin (LIPITOR) tablet 40 mg  40 mg Oral DAILY    clopidogrel (PLAVIX) tablet 75 mg  75 mg Oral DAILY    cyanocobalamin tablet 1,000 mcg  1,000 mcg Oral DAILY    digoxin (LANOXIN) tablet 0.25 mg  0.25 mg Oral DAILY    DULoxetine (CYMBALTA) capsule 60 mg  60 mg Oral DAILY    hydroCHLOROthiazide (HYDRODIURIL) tablet 25 mg  25 mg Oral DAILY    losartan (COZAAR) tablet 100 mg  100 mg Oral DAILY    sodium chloride (NS) flush 5-10 mL  5-10 mL IntraVENous Q8H    sodium chloride (NS) flush 5-10 mL  5-10 mL IntraVENous PRN    lactated ringers infusion  100 mL/hr IntraVENous CONTINUOUS    acetaminophen (TYLENOL) tablet 650 mg  650 mg Oral Q4H PRN    oxyCODONE-acetaminophen (PERCOCET) 5-325 mg per tablet 1 Tab  1 Tab Oral Q4H PRN    morphine injection 2 mg  2 mg IntraVENous Q2H PRN    naloxone (NARCAN) injection 0.4 mg  0.4 mg IntraVENous PRN    ondansetron (ZOFRAN) injection 4 mg  4 mg IntraVENous Q4H PRN    diphenhydrAMINE (BENADRYL) injection 12.5 mg  12.5 mg IntraVENous Q4H PRN    docusate sodium (COLACE) capsule 100 mg  100 mg Oral BID    heparin (porcine) injection 5,000 Units  5,000 Units SubCUTAneous Q8H    metoprolol tartrate (LOPRESSOR) tablet 25 mg  25 mg Oral Q8H         Physical Exam:     Visit Vitals    BP 97/64    Pulse (!) 128    Temp 97.9 °F (36.6 °C)    Resp 18    Ht 5' 10\" (1.778 m)    Wt 77.1 kg (170 lb)    SpO2 99%    BMI 24.39 kg/m2     BP Readings from Last 3 Encounters:   04/05/17 97/64   03/29/17 100/70   03/17/17 102/68     Pulse Readings from Last 3 Encounters:   04/05/17 (!) 128   03/29/17 98   03/17/17 98     Wt Readings from Last 3 Encounters:   04/04/17 77.1 kg (170 lb)   03/29/17 77.1 kg (170 lb)   03/17/17 77.1 kg (170 lb)       General:  alert, cooperative, no distress, appears stated age, seated in chair making jokes. Neck:  no masses, no stridor, no JVD  Lungs:  clear to auscultation bilaterally  Heart:  irregularly irregular rhythm, S1, S2 normal  Abdomen:  abdomen is soft without significant tenderness, masses, organomegaly or guarding  Extremities:  extremities normal, atraumatic, no cyanosis or edema  Skin: Warm and dry. no hyperpigmentation, vitiligo, or suspicious lesions  Neuro: alert, oriented x3, affect appropriate, no focal neurological deficits, moves all extremities well, no involuntary movements  Psych: non focal     Data Review:     Recent Labs      04/05/17   0432  04/04/17   1900   WBC  18.4*  17.5*   HGB  9.8*  9.8*   HCT  26.9*  27.1*   PLT  327  301     Recent Labs      04/05/17   0432  04/04/17   1900   NA  125*  126*   K  3.9  3.4*   CL  93*  91*   CO2  20*  23   GLU  112*  128*   BUN  35*  29*   CREA  4.00*  3.78*   CA  7.4*  7.1*       Results for orders placed or performed in visit on 02/24/17   AMB POC EKG ROUTINE W/ 12 LEADS, INTER & REP    Narrative    Atrial fibrillation. Cannot rule out anteroseptal myocardial infarction age-indeterminate.   Nonspecific ST-T changes   Results for orders placed or performed during the hospital encounter of 01/20/15   EKG, 12 LEAD, INITIAL   Result Value Ref Range    Ventricular Rate 88 BPM    Atrial Rate 88 BPM    P-R Interval 148 ms    QRS Duration 86 ms    Q-T Interval 368 ms    QTC Calculation (Bezet) 445 ms    Calculated P Axis 63 degrees    Calculated R Axis 3 degrees    Calculated T Axis 20 degrees    Diagnosis       Normal sinus rhythm  Possible Left atrial enlargement  Nonspecific ST abnormality  Abnormal ECG  No previous ECGs available  Confirmed by Tee Puri MD ([id number]) on 1/20/2015 2:22:43 PM       All Cardiac Markers in the last 24 hours:  No results found for: CPK, CKMMB, CKMB, RCK3, CKMBT, CKNDX, CKND1, LEO, TROPT, TROIQ, DEWAYNE, TROPT, TNIPOC, BNP, BNPP    Last Lipid:    Lab Results   Component Value Date/Time    Cholesterol, total 157 12/29/2015 07:18 AM    HDL Cholesterol 28 12/29/2015 07:18 AM    LDL, calculated 112.4 12/29/2015 07:18 AM    Triglyceride 83 12/29/2015 07:18 AM    CHOL/HDL Ratio 5.6 12/29/2015 07:18 AM       Signed By: Carmencita GARCIA    April 5, 2017

## 2017-04-05 NOTE — OP NOTES
[address]    Name:  Poppy Gonzales  MR#:  [id number]  :  1960  Account #:  [de-identified]  Date of Adm:  2017  Date of Surgery:  2017      ATTENDING: Isabelle Lucero MD    PREOPERATIVE DIAGNOSIS: Rest pain of bilateral lower extremities. POSTOPERATIVE DIAGNOSES  1. Rest pain of bilateral lower extremities. 2. Aortoiliac occlusive disease. PROCEDURES PERFORMED  1. Right axillary to bifemoral artery bypass using an 8 mm Propaten  graft from the right axilla to the right common femoral artery with a  jump femoral-femoral bypass with another 8 mm Propaten external  ring bypass. 2. Bilateral common femoral artery exposures. 3. Right axillary artery exposure. 4. Right common femoral artery, and profunda femoris artery and  superficial femoral artery endarterectomy causing an extensive surgery  on the right side. No patch was placed. The graft was used as the  patch, but this did take an additional 45 minutes longer than expected. CULTURES: None. SPECIMENS REMOVED: None. DRAINS: None. ESTIMATED BLOOD LOSS: 300 mL. ANESTHESIA: General.    INDICATIONS FOR PROCEDURE: The patient is a 58-year-old  gentleman with aortoiliac occlusive disease. The patient had originally  been recommended for an aortobifemoral bypass. On workup, he was  found to have significant coronary artery disease in need of stenting. The patient ended up undergoing stenting which then precluded him  from getting an aortic base procedure. So decision was made for an  axillary bifemoral bypass. The patient was given risks and benefits of  the procedure including but not limited to bleeding, infection, damage  to adjacent structures, MI, stroke, and death, as well as need for  further surgery and loss of lower extremities. The patient was  understanding of all the risks and underwent the procedure.     DESCRIPTION OF PROCEDURE: The patient was correctly identified  in the preoperative holding area and taken to the operating room in  stable condition. The patient had a pre-incision timeout prior to any the  incision. The patient was prepped and draped in normal sterile fashion  according to CDC guidelines in aseptic technique. We then were able  to dissect out the right axillary artery 2 fingerbreadths below the  clavicle. We were able to get through the pectoralis major, move the  pectoralis minor out laterally. Found the axillary artery, looped this with  red vessel loops in a Restrepo fashion both proximally and distally. We  then were able to get out both common femoral arteries with an  oblique incision and dissect out the SFA and profunda femoris artery. This was duplicated on the right side. The profunda was not on the left,  but we looped all vessels appropriately with red vessel loops in a Restrepo  fashion and all branches with blue vessel loops in a Restrepo fashion. We  then were able to tunnel our fem-fem and place in umbilical tape  through that using a finger and a large clamp. We then took a long  tunneler and were able to tunnel through the femoral to the axilla  underneath the pectoralis and place an 8 mm external ring Propaten  graft and brought that down to the groin. We then were able to  heparinize the patient appropriately, create an arteriotomy and with an  11 blade and Restrepo scissors, attached out 8 mm graft using CV5  suture, 3 repair sutures were required. We then were able to plug this  onto the right common femoral artery, but once we opened up the  common femoral artery, we did have a lot of plaque in this area. Yvrose Padck was used to remove almost all the plaque from the common  femoral artery. The SFA was basically occluded at the origin, but we  were able to get it so that it was partially opened and open up the  profunda femoris artery.  Numerous tacking sutures were required on  the SFA as well as the profunda femoris to keep these areas open, but  we had good back bleeding from the SFA, although there is still  significant disease within the SFA of what we saw, which was probably  first 3-4 cm of the artery. We then were able to create an anastomosis  on the common femoral artery onto the SFA. This was done with CV5  suture, 2 repair sutures were required. We had good Doppler signals  and then we were able to clamp the graft itself and create a graft-to-  graft anastomosis with the 8 mm PTFE onto the axillary femoral  bypass with CV5 suture, 3 repair stitches were required, and then we  were able to put this onto the left side. We opened up the common  femoral artery on to the SFA create anastomosis with CV5 suture, only  one repair suture was required. We then used Surgicel, and manual  compression and reversal of heparin. We were able to get some good  hemostasis in all the groins. We did have tract bleeding out of all  tracts. We then copiously irrigated all 3 wounds, closed in a multilayer  fashion with 3-0 Vicryl suture and 4-0 Vicryl subcuticular suture with  Steri-Strips and Covaderm for all incisions. The patient tolerated the  procedure, had Doppler signals in both lower extremities at the end of  the case and taken to the ICU in a stable condition.         MD ALFONSO Lucero / Fahad Flood  D:  04/04/2017   16:17  T:  04/04/2017   22:07  Job #:  [id number]

## 2017-04-05 NOTE — TELEPHONE ENCOUNTER
Called the patient to have him schedule an appointment to receive further refills of his medication. The patient states he is currently in the hospital. He will contact the office to reschedule once he has been discharged.

## 2017-04-05 NOTE — PROGRESS NOTES
conducted an initial consultation and Spiritual Assessment for Meliton Fox, who is a 59 y.o.,male. Patients Primary Language is: [place]. According to the patients EMR Mormonism Affiliation is: No preference. The reason the Patient came to the hospital is:   Patient Active Problem List    Diagnosis Date Noted    CAD (coronary artery disease) 03/15/2017    Aortoiliac occlusive disease ([facility] [address] 01/25/2017    Atherosclerosis of native artery of both lower extremities with intermittent claudication ([facility] [address] 01/25/2017    PAD (peripheral artery disease) ([facility] [address] 01/25/2017    Spinal stenosis, lumbar 12/13/2016    Aortic valve stenosis 12/13/2016    Hereditary and idiopathic neuropathy, unspecified 12/13/2016    Radiculopathy, lumbar region 12/13/2016    Lumbar neuritis 11/15/2016    Spinal stenosis, lumbar region, without neurogenic claudication 11/15/2016    Hereditary peripheral neuropathy 11/15/2016    Erectile dysfunction 07/05/2016    Hypertension 01/26/2015    Vitamin B12 deficiency 01/26/2015    Low back pain 01/26/2015    DDD (degenerative disc disease), lumbar 01/26/2015        The  provided the following Interventions:  Initiated a relationship of care and support. Provided information about Spiritual Care Services. Chart reviewed. The following outcomes where achieved:  Patient expressed gratitude for 's visit. Assessment:  There are no spiritual or Cheondoism issues which require intervention at this time. Plan:  Chaplains will continue to follow and will provide pastoral care on an as needed/requested basis.  recommends bedside caregivers page  on duty if patient shows signs of acute spiritual or emotional distress.     [address]  ([phone])

## 2017-04-06 LAB
ANION GAP BLD CALC-SCNC: 14 MMOL/L (ref 3–18)
BASOPHILS # BLD AUTO: 0 K/UL (ref 0–0.06)
BASOPHILS # BLD: 0 % (ref 0–3)
BUN SERPL-MCNC: 44 MG/DL (ref 7–18)
BUN/CREAT SERPL: 12 (ref 12–20)
CA-I SERPL-SCNC: 0.99 MMOL/L (ref 1.12–1.32)
CALCIUM SERPL-MCNC: 7.5 MG/DL (ref 8.5–10.1)
CHLORIDE SERPL-SCNC: 91 MMOL/L (ref 100–108)
CO2 SERPL-SCNC: 19 MMOL/L (ref 21–32)
CREAT SERPL-MCNC: 3.74 MG/DL (ref 0.6–1.3)
DIFFERENTIAL METHOD BLD: ABNORMAL
DIGOXIN SERPL-MCNC: 0.5 NG/ML (ref 0.9–2)
EOSINOPHIL # BLD: 0 K/UL (ref 0–0.4)
EOSINOPHIL NFR BLD: 0 % (ref 0–5)
ERYTHROCYTE [DISTWIDTH] IN BLOOD BY AUTOMATED COUNT: 14.3 % (ref 11.6–14.5)
GLUCOSE SERPL-MCNC: 87 MG/DL (ref 74–99)
HCT VFR BLD AUTO: 24.6 % (ref 36–48)
HGB BLD-MCNC: 9 G/DL (ref 13–16)
LYMPHOCYTES # BLD AUTO: 7 % (ref 20–51)
LYMPHOCYTES # BLD: 1.2 K/UL (ref 0.8–3.5)
MAGNESIUM SERPL-MCNC: 1.4 MG/DL (ref 1.6–2.6)
MCH RBC QN AUTO: 29.9 PG (ref 24–34)
MCHC RBC AUTO-ENTMCNC: 36.6 G/DL (ref 31–37)
MCV RBC AUTO: 81.7 FL (ref 74–97)
MONOCYTES # BLD: 0.9 K/UL (ref 0–1)
MONOCYTES NFR BLD AUTO: 5 % (ref 2–9)
NEUTS SEG # BLD: 15 K/UL (ref 1.8–8)
NEUTS SEG NFR BLD AUTO: 88 % (ref 42–75)
PHOSPHATE SERPL-MCNC: 3.1 MG/DL (ref 2.5–4.9)
PLATELET # BLD AUTO: 375 K/UL (ref 135–420)
PLATELET COMMENTS,PCOM: ABNORMAL
PMV BLD AUTO: 10.2 FL (ref 9.2–11.8)
POTASSIUM SERPL-SCNC: 4.7 MMOL/L (ref 3.5–5.5)
RBC # BLD AUTO: 3.01 M/UL (ref 4.7–5.5)
RBC MORPH BLD: ABNORMAL
RBC MORPH BLD: ABNORMAL
SODIUM SERPL-SCNC: 124 MMOL/L (ref 136–145)
TSH SERPL DL<=0.05 MIU/L-ACNC: 4.77 UIU/ML (ref 0.36–3.74)
WBC # BLD AUTO: 17.1 K/UL (ref 4.6–13.2)

## 2017-04-06 PROCEDURE — 74011000250 HC RX REV CODE- 250: Performed by: INTERNAL MEDICINE

## 2017-04-06 PROCEDURE — 84100 ASSAY OF PHOSPHORUS: CPT | Performed by: INTERNAL MEDICINE

## 2017-04-06 PROCEDURE — 74011250636 HC RX REV CODE- 250/636: Performed by: SURGERY

## 2017-04-06 PROCEDURE — 74011250636 HC RX REV CODE- 250/636: Performed by: INTERNAL MEDICINE

## 2017-04-06 PROCEDURE — 74011250637 HC RX REV CODE- 250/637: Performed by: SURGERY

## 2017-04-06 PROCEDURE — 74011000258 HC RX REV CODE- 258: Performed by: INTERNAL MEDICINE

## 2017-04-06 PROCEDURE — 65620000000 HC RM CCU GENERAL

## 2017-04-06 PROCEDURE — 83735 ASSAY OF MAGNESIUM: CPT | Performed by: INTERNAL MEDICINE

## 2017-04-06 PROCEDURE — 85025 COMPLETE CBC W/AUTO DIFF WBC: CPT | Performed by: INTERNAL MEDICINE

## 2017-04-06 PROCEDURE — 84443 ASSAY THYROID STIM HORMONE: CPT | Performed by: INTERNAL MEDICINE

## 2017-04-06 PROCEDURE — 82330 ASSAY OF CALCIUM: CPT | Performed by: INTERNAL MEDICINE

## 2017-04-06 PROCEDURE — 80048 BASIC METABOLIC PNL TOTAL CA: CPT | Performed by: INTERNAL MEDICINE

## 2017-04-06 PROCEDURE — 80162 ASSAY OF DIGOXIN TOTAL: CPT | Performed by: INTERNAL MEDICINE

## 2017-04-06 PROCEDURE — 36415 COLL VENOUS BLD VENIPUNCTURE: CPT | Performed by: INTERNAL MEDICINE

## 2017-04-06 RX ORDER — MAGNESIUM SULFATE HEPTAHYDRATE 40 MG/ML
2 INJECTION, SOLUTION INTRAVENOUS ONCE
Status: COMPLETED | OUTPATIENT
Start: 2017-04-06 | End: 2017-04-06

## 2017-04-06 RX ORDER — DIGOXIN 0.25 MG/ML
250 INJECTION INTRAMUSCULAR; INTRAVENOUS
Status: COMPLETED | OUTPATIENT
Start: 2017-04-06 | End: 2017-04-06

## 2017-04-06 RX ORDER — DILTIAZEM HYDROCHLORIDE 5 MG/ML
20 INJECTION INTRAVENOUS ONCE
Status: COMPLETED | OUTPATIENT
Start: 2017-04-06 | End: 2017-04-06

## 2017-04-06 RX ORDER — DILTIAZEM HYDROCHLORIDE 5 MG/ML
5 INJECTION INTRAVENOUS ONCE
Status: DISCONTINUED | OUTPATIENT
Start: 2017-04-06 | End: 2017-04-06 | Stop reason: SDUPTHER

## 2017-04-06 RX ADMIN — CLOPIDOGREL 75 MG: 75 TABLET, FILM COATED ORAL at 10:08

## 2017-04-06 RX ADMIN — Medication 10 ML: at 13:08

## 2017-04-06 RX ADMIN — DIGOXIN 250 MCG: 250 INJECTION, SOLUTION INTRAMUSCULAR; INTRAVENOUS at 10:32

## 2017-04-06 RX ADMIN — DOCUSATE SODIUM 100 MG: 100 CAPSULE, LIQUID FILLED ORAL at 18:28

## 2017-04-06 RX ADMIN — SODIUM CHLORIDE 10 MG/HR: 900 INJECTION, SOLUTION INTRAVENOUS at 10:33

## 2017-04-06 RX ADMIN — Medication 10 ML: at 05:27

## 2017-04-06 RX ADMIN — ASPIRIN 81 MG CHEWABLE TABLET 81 MG: 81 TABLET CHEWABLE at 10:08

## 2017-04-06 RX ADMIN — Medication 1000 MCG: at 10:09

## 2017-04-06 RX ADMIN — MAGNESIUM SULFATE HEPTAHYDRATE 2 G: 40 INJECTION, SOLUTION INTRAVENOUS at 13:05

## 2017-04-06 RX ADMIN — SODIUM CHLORIDE 10 MG/HR: 900 INJECTION, SOLUTION INTRAVENOUS at 18:26

## 2017-04-06 RX ADMIN — HEPARIN SODIUM 5000 UNITS: 5000 INJECTION, SOLUTION INTRAVENOUS; SUBCUTANEOUS at 14:00

## 2017-04-06 RX ADMIN — METOPROLOL TARTRATE 25 MG: 25 TABLET ORAL at 05:21

## 2017-04-06 RX ADMIN — DILTIAZEM HYDROCHLORIDE 20 MG: 5 INJECTION INTRAVENOUS at 10:32

## 2017-04-06 RX ADMIN — METOPROLOL TARTRATE 25 MG: 25 TABLET ORAL at 14:00

## 2017-04-06 RX ADMIN — ATORVASTATIN CALCIUM 40 MG: 40 TABLET, FILM COATED ORAL at 10:08

## 2017-04-06 RX ADMIN — HEPARIN SODIUM 5000 UNITS: 5000 INJECTION, SOLUTION INTRAVENOUS; SUBCUTANEOUS at 05:21

## 2017-04-06 RX ADMIN — DEXTROSE MONOHYDRATE: 5 INJECTION, SOLUTION INTRAVENOUS at 15:00

## 2017-04-06 NOTE — PROGRESS NOTES
Bedside and Verbal shift change report given to Tera Ortiz RN  (oncoming nurse) by Marisela Galdamez (offgoing nurse). Report included the following information SBAR, Kardex, MAR and Recent Results.     Marisela Galdamez

## 2017-04-06 NOTE — PROGRESS NOTES
Pt scheduled for CO2 angio of right leg tomorrow per Dr Wade Soto. Keep NPO after midnight.      Elvia Harris  [phone]

## 2017-04-06 NOTE — PROGRESS NOTES
RENAL DAILY PROGRESS NOTE    Patient: Mariella Chan               Sex: male          DOA: 4/4/2017  6:27 AM        YOB: 1960      Age:  59 y.o.        LOS:  LOS: 2 days     Subjective: Mariella Chan is a 59 y.o.  who presents with Atheroscler of native artery of both legs with intermit claudication ([facility] [address] [I70.213].    Asked to evaluate for arf,hx of cad,stents,afib,peripheral vascular disease ,s/p surgery  Chief complains: Patient denies nausea, vomiting, chest pain, dizziness, shortness of breath or headache.  - Reviewed last 24 hrs events     Current Facility-Administered Medications   Medication Dose Route Frequency    dilTIAZem (CARDIZEM) 100 mg in 0.9% sodium chloride (MBP/ADV) 100 mL infusion  10 mg/hr IntraVENous TITRATE    magnesium sulfate 2 g/50 ml IVPB (premix or compounded)  2 g IntraVENous ONCE    sodium bicarbonate (8.4%) 100 mEq in dextrose 5% 1,000 mL infusion   IntraVENous CONTINUOUS    PHENYLephrine 30 mg in 0.9% sodium chloride 250 mL (ANISH-SYNEPHRINE) infusion   mcg/min IntraVENous TITRATE    [START ON 4/7/2017] digoxin (LANOXIN) tablet 0.125 mg  0.125 mg Oral Q MON, WED & FRI    sodium chloride (NS) flush 5-10 mL  5-10 mL IntraVENous PRN    glucose chewable tablet 16 g  4 Tab Oral PRN    glucagon (GLUCAGEN) injection 1 mg  1 mg IntraMUSCular PRN    dextrose (D50W) injection syrg 12.5-25 g  25-50 mL IntraVENous PRN    fentaNYL citrate (PF) injection 50 mcg  50 mcg IntraVENous Q10MIN PRN    HYDROmorphone (PF) (DILAUDID) injection 0.5 mg  0.5 mg IntraVENous PRN    aspirin chewable tablet 81 mg  81 mg Oral DAILY    atorvastatin (LIPITOR) tablet 40 mg  40 mg Oral DAILY    clopidogrel (PLAVIX) tablet 75 mg  75 mg Oral DAILY    cyanocobalamin tablet 1,000 mcg  1,000 mcg Oral DAILY    sodium chloride (NS) flush 5-10 mL  5-10 mL IntraVENous Q8H    sodium chloride (NS) flush 5-10 mL  5-10 mL IntraVENous PRN    acetaminophen (TYLENOL) tablet 650 mg  650 mg Oral Q4H PRN    oxyCODONE-acetaminophen (PERCOCET) 5-325 mg per tablet 1 Tab  1 Tab Oral Q4H PRN    morphine injection 2 mg  2 mg IntraVENous Q2H PRN    naloxone (NARCAN) injection 0.4 mg  0.4 mg IntraVENous PRN    ondansetron (ZOFRAN) injection 4 mg  4 mg IntraVENous Q4H PRN    diphenhydrAMINE (BENADRYL) injection 12.5 mg  12.5 mg IntraVENous Q4H PRN    docusate sodium (COLACE) capsule 100 mg  100 mg Oral BID    heparin (porcine) injection 5,000 Units  5,000 Units SubCUTAneous Q8H    metoprolol tartrate (LOPRESSOR) tablet 25 mg  25 mg Oral Q8H       Objective:     Visit Vitals    /69    Pulse (!) 110    Temp 97.9 °F (36.6 °C)    Resp 24    Ht 5' 10\" (1.778 m)    Wt 77.1 kg (170 lb)    SpO2 98%    BMI 24.39 kg/m2       Intake/Output Summary (Last 24 hours) at 04/06/17 1301  Last data filed at 04/06/17 1100   Gross per 24 hour   Intake             1160 ml   Output             1835 ml   Net             -675 ml       Physical Examination:     GEN: AAO X 3, NAD  RS: Chest is bilateral equal, no wheezing / rales / crackles  CVS: irregular  Abdomen: Soft, Non tender, Not distended, Positive bowel sounds, no organomegaly, no CVA / supra pubic tenderness  Extremities: No edema,  CNS: Awake & follows commands, CN II-XII are grossly intact. HEENT: Head is atraumatic, PERRLA, conjunctiva pink & non icteric. No JVD or carotid bruit   Psychiatric: Normal mood, affect, judgement & memory    Musculoskeletal: No gross joints or bone deformities   Lymph Node: No palpable cervical, axillary or groin lymphadenopathy.       Data Review:      Labs:     Hematology: Recent Labs      04/06/17   0530  04/05/17   0432  04/04/17   1900   WBC  17.1*  18.4*  17.5*   HGB  9.0*  9.8*  9.8*   HCT  24.6*  26.9*  27.1*     Chemistry: Recent Labs      04/06/17   0530  04/05/17   2126  04/05/17   0432  04/04/17   1900   BUN  44*   --   35*  29*   CREA  3.74*   --   4.00*  3.78*   CA  7.5*  7.6*  7.4*  7.1*   K  4.7   --   3.9 3.4*   NA  124*   --   125*  126*   CL  91*   --   93*  91*   CO2  19*   --   20*  23   PHOS  3.1   --    --    --    GLU  87   --   112*  128*        Images:    XR (Most Recent). CXR reviewed by me and compared with previous CXR   Results from Hospital Encounter encounter on 04/04/17   XR CHEST PORT   Narrative EXAM: Chest Radiograph    INDICATION: White count    TECHNIQUE: AP view of the chest    COMPARISON: 3/3/2017    FINDINGS: No pneumothorax identified. The lungs are clear. No infiltrates  appreciated. No effusions identified. Cardiomegaly is again noted and  unchanged. The pulmonary vasculature is unremarkable. The osseous structures  are unremarkable. Impression Impression:  1. No acute infiltrate or effusion. 2.  Cardiac enlargement is noted. CT (Most Recent)   Results from Hospital Encounter encounter on 01/24/17   CTA ABD ART W RUNOFF W WO CONT   Narrative CTA of the abdominal aorta with runoff of the lower extremities. HISTORY: Aortoiliac occlusion seen on MRI. COMPARISON: Lumbar spine MRI dated November 25, 2016. TECHNIQUE: A CTA of the abdominal aorta was performed with lower extremity  runoff. Images were obtained in arterial phase. Multiplanar, 3-D, and MIPS  reconstructions were generated on a separate workstation and archived. Dose  reduction techniques used: Automated exposure control, adjustment of the mAs  and/or KVP according to the patient size, standardized low-dose protocol, and/or  iterative reconstruction technique. CTA findings: The abdominal aorta is normal in caliber to the level of the renal arteries. The  celiac artery is patent. The SMA is patent with atherosclerosis near its origin. The renal arteries are patent with atherosclerotic irregularity near their  origin, but no flow-limiting stenosis. The PRIETO is patent. Distal to the renal arteries, there is an abdominal aortic aneurysm measuring  3.0 cm x 2.8 cm.  There is extensive soft plaque consistent with mural thrombus. There is likely high-grade stenosis in the distal aorta with only approximately  8 mm of an opacified lumen. There is high-grade stenosis at the origin of the right common iliac artery with  multifocal irregularities and mild stenosis in the midportion. The right  external iliac artery is patent with multifocal irregularities and moderate  stenosis just above the inguinal ligament. The right common femoral artery is  patent with irregularity and mild stenosis proximally. The right SFA is patent. There is extensive irregularity of the right SFA with multiple areas of mild  stenosis. The right popliteal artery is patent. There is severe stenosis at the  level of the femoral condyle. There is moderate stenosis just below the level of  the knee. The tibioperoneal trunk is patent. The anterior tibial artery is  patent with multifocal irregularities. The posterior tibial artery is patent  with multifocal irregularities. The peroneal artery is patent with multifocal  irregularities. This represents a three-vessel runoff to the right foot. The left common iliac artery is likely occluded at its origin. The left external  iliac artery remains occluded to the level of the left common femoral artery  which is patent via collateral filling. There is irregularity of the left common  femoral artery without significant stenosis. The left SFA is patent with  multifocal irregularities which result in severe stenosis at multiple portions  of the mid SFA. The left popliteal artery is patent with moderate stenosis at  the level of the femoral condyles. The tibial peroneal trunk is patent with  multifocal irregularities. The anterior tibial, posterior tibial, and peroneal  arteries are patent proximally with multifocal irregularities. The peroneal  artery is not opacified at the level of the ankle. There is at least a  two-vessel runoff to the left foot. Nonvascular findings:  There are small bilateral pleural effusions. There is  associated atelectasis. The heart is enlarged in size. Liver: Unremarkable. Gallbladder: Unremarkable. Spleen: Unremarkable. Adrenal glands: Unremarkable. Pancreas: Mildly atrophic. Kidneys: Unremarkable. Bowel: No bowel obstruction. No focal bowel wall thickening is identified. There  is mild colonic diverticulosis without acute diverticulitis. Nodes: Nonenlarged by size criteria. Bladder: Unremarkable. Prostate: Mildly enlarged. Bilateral scrotal hydroceles, right greater than left. Bilateral fat containing  inguinal hernias with a small amount of fluid in the right inguinal hernia as  well. Evaluation of the soft tissues of the lower extremities reveals atrophic  musculature, but no other focal abnormality appreciated. Impression Impression:  1. Significant distal aortic stenosis with an infrarenal abdominal aorta and  extensive atherosclerotic soft plaque/mural thrombus with a minimal luminal  diameter of approximately 8 mm. 2.  Significant arterial inflow disease of the left leg with occlusion of the  left common iliac artery at its origin with the left external iliac artery also  remaining largely occluded. The left common femoral artery/distal left external  iliac artery reconstitutes via collaterals. There is extensive multifocal  atherosclerotic irregularity and stenoses in the left SFA, popliteal artery, and  calf arteries as above. There is at least a two-vessel runoff to the left foot. The peroneal artery at the level of the ankle is nonopacified, which could be  due to occlusion or contrast bolus timing. 3.  Significant arterial inflow disease of the right leg with high-grade  stenosis near the origin of the right common iliac artery with additional mild  stenosis in its midportion. The right external iliac artery is patent with  moderate stenosis just above the inguinal ligament.     4.  Additional right lower extremity arterial stenoses as described above with a  three-vessel runoff to the right foot. 5.  Small bilateral pleural effusions. 6.  Small bilateral scrotal hydroceles. EKG Results for orders placed or performed in visit on 02/24/17   AMB POC EKG ROUTINE W/ 12 LEADS, INTER & REP     Status: None    Narrative    Atrial fibrillation. Cannot rule out anteroseptal myocardial infarction age-indeterminate. Nonspecific ST-T changes        I have personally reviewed the old medical records and patient's labs    Plan / Recommendation:      1. Arf,onset post cardiac cath 3/15/17,improving overnight  2.urinary retention,lopez reinserted last night  3.mild rhabdo,mild acidosis,change to bicarb drip  4.hypocalcemia,check ionized calcium and phos  5.afib,on cardizem drip,and digoxin  6.hypomagnesemia,replaced  7.anemia  8.microscopic hematuria,?related to lopez trauma. follow    D/w Dr. Jocelyn Fernandez MD  Nephrology  4/6/2017

## 2017-04-06 NOTE — ROUTINE PROCESS
2047 pt hasn't void in 6 hrs from lopez being D/c'd pt has the urgency but unable to void PVR noted >300 in bladder. Dr. Mynor Perez notified ordered to place lopez   0730 Bedside and Verbal shift change report given to Marley Tam (oncoming nurse) by Juve Joaquin RN   (offgoing nurse). Report included the following information SBAR, Kardex, OR Summary, Procedure Summary, Intake/Output, MAR, Recent Results and Cardiac Rhythm Afib.

## 2017-04-06 NOTE — PROGRESS NOTES
Admit Date: 2017    POD 2 Day Post-Op    Procedure:  Procedure(s) with comments:  RIGHT AXILLO-FEMORAL BYPASS - right axilla to femoral area    Subjective:     Patient doing well. No new complaints. Pain seems to be controlled. He is unsure if color of right foot improved or not. He does have known distal disease, worse on the right  Cardiology and Renal recs noted. Jiang replaced due to pt unable to void. Good UO. Objective:     Blood pressure 97/64, pulse (!) 128, temperature 98.1 °F (36.7 °C), resp. rate 18, height 5' 10\" (1.778 m), weight 170 lb (77.1 kg), SpO2 97 %. Temp (24hrs), Av °F (36.7 °C), Min:97.7 °F (36.5 °C), Max:98.3 °F (36.8 °C)      Physical Exam:    A/O x 3, NAD. Irreg  Right foot cool to touch with mottled appearance. Minimal sluggish flow on doppler. Mild edema. Incisions c/d/i. Significant ecchymosis on right side, bypass palpable with good doppler signals. Significant scrotal edema and bruising .      Labs:   Recent Results (from the past 24 hour(s))   GLUCOSE, POC    Collection Time: 17 11:48 AM   Result Value Ref Range    Glucose (POC) 123 (H) 70 - 110 mg/dL   EKG, 12 LEAD, INITIAL    Collection Time: 17  1:12 PM   Result Value Ref Range    Ventricular Rate 130 BPM    Atrial Rate 125 BPM    QRS Duration 88 ms    Q-T Interval 330 ms    QTC Calculation (Bezet) 485 ms    Calculated R Axis 4 degrees    Calculated T Axis -125 degrees    Diagnosis       Atrial fibrillation with rapid ventricular response with premature   ventricular or aberrantly conducted complexes  ST & T wave abnormality, consider inferolateral ischemia or digitalis effect  Abnormal ECG  When compared with ECG of 16-MAR-2017 05:50,  No significant change was found  Confirmed by Laureano Melgar MD, Mary Lou Wisdom ([id number]) on 2017 3:55:24 PM     GLUCOSE, POC    Collection Time: 17  6:01 PM   Result Value Ref Range    Glucose (POC) 113 (H) 70 - 110 mg/dL   CREATININE, UR, RANDOM    Collection Time: 04/05/17  9:00 PM   Result Value Ref Range    Creatinine, urine 50.90 30 - 125 mg/dL   PROTEIN URINE, RANDOM    Collection Time: 04/05/17  9:00 PM   Result Value Ref Range    Protein, urine random 18 (H) <11.9 mg/dL   SODIUM, UR, RANDOM    Collection Time: 04/05/17  9:00 PM   Result Value Ref Range    Sodium urine, random 47 20 - 110 MMOL/L   URINALYSIS W/MICROSCOPIC    Collection Time: 04/05/17  9:00 PM   Result Value Ref Range    Color YELLOW      Appearance CLEAR      Specific gravity 1.008 1.005 - 1.030      pH (UA) 6.0 5.0 - 8.0      Protein NEGATIVE  NEG mg/dL    Glucose NEGATIVE  NEG mg/dL    Ketone NEGATIVE  NEG mg/dL    Bilirubin NEGATIVE  NEG      Blood LARGE (A) NEG      Urobilinogen 0.2 0.2 - 1.0 EU/dL    Nitrites NEGATIVE  NEG      Leukocyte Esterase TRACE (A) NEG      WBC 1 to 3 0 - 4 /hpf    RBC 50 to 75 0 - 5 /hpf    Epithelial cells FEW 0 - 5 /lpf    Bacteria NEGATIVE  NEG /hpf   EOSINOPHILS, URINE    Collection Time: 04/05/17  9:00 PM   Result Value Ref Range    Eosinophils,urine NO EOSINOPHILS SEEN     PTH INTACT    Collection Time: 04/05/17  9:26 PM   Result Value Ref Range    Calcium 7.6 (L) 8.5 - 10.1 MG/DL    PTH, Intact 159.5 (H) 14.0 - 72.0 pg/mL   CARDIAC PANEL,(CK, CKMB & TROPONIN)    Collection Time: 04/05/17  9:26 PM   Result Value Ref Range    CK 5199 (H) 39 - 308 U/L    CK - MB 91.3 (H) <3.6 ng/ml    CK-MB Index 1.8 0.0 - 4.0 %    Troponin-I, Qt. 0.04 0.0 - 0.045 NG/ML   DIGOXIN    Collection Time: 04/05/17  9:26 PM   Result Value Ref Range    DIGOXIN 0.5 (L) 0.9 - 2.0 NG/ML   METABOLIC PANEL, BASIC    Collection Time: 04/06/17  5:30 AM   Result Value Ref Range    Sodium 124 (L) 136 - 145 mmol/L    Potassium 4.7 3.5 - 5.5 mmol/L    Chloride 91 (L) 100 - 108 mmol/L    CO2 19 (L) 21 - 32 mmol/L    Anion gap 14 3.0 - 18 mmol/L    Glucose 87 74 - 99 mg/dL    BUN 44 (H) 7.0 - 18 MG/DL    Creatinine 3.74 (H) 0.6 - 1.3 MG/DL    BUN/Creatinine ratio 12 12 - 20      GFR est AA 20 (L) >60 ml/min/1.73m2    GFR est non-AA 17 (L) >60 ml/min/1.73m2    Calcium 7.5 (L) 8.5 - 10.1 MG/DL   MAGNESIUM    Collection Time: 04/06/17  5:30 AM   Result Value Ref Range    Magnesium 1.4 (L) 1.6 - 2.6 mg/dL   CBC WITH AUTOMATED DIFF    Collection Time: 04/06/17  5:30 AM   Result Value Ref Range    WBC 17.1 (H) 4.6 - 13.2 K/uL    RBC 3.01 (L) 4.70 - 5.50 M/uL    HGB 9.0 (L) 13.0 - 16.0 g/dL    HCT 24.6 (L) 36.0 - 48.0 %    MCV 81.7 74.0 - 97.0 FL    MCH 29.9 24.0 - 34.0 PG    MCHC 36.6 31.0 - 37.0 g/dL    RDW 14.3 11.6 - 14.5 %    PLATELET 620 113 - 138 K/uL    MPV 10.2 9.2 - 11.8 FL    NEUTROPHILS 88 (H) 42 - 75 %    LYMPHOCYTES 7 (L) 20 - 51 %    MONOCYTES 5 2 - 9 %    EOSINOPHILS 0 0 - 5 %    BASOPHILS 0 0 - 3 %    ABS. NEUTROPHILS 15.0 (H) 1.8 - 8.0 K/UL    ABS. LYMPHOCYTES 1.2 0.8 - 3.5 K/UL    ABS. MONOCYTES 0.9 0 - 1.0 K/UL    ABS. EOSINOPHILS 0.0 0.0 - 0.4 K/UL    ABS. BASOPHILS 0.0 0.0 - 0.06 K/UL    DF MANUAL      RBC COMMENTS ANISOCYTOSIS  1+        RBC COMMENTS TARGET CELLS  FEW        PLATELET COMMENTS ADEQUATE PLATELETS     PHOSPHORUS    Collection Time: 04/06/17  5:30 AM   Result Value Ref Range    Phosphorus 3.1 2.5 - 4.9 MG/DL   DIGOXIN    Collection Time: 04/06/17  5:30 AM   Result Value Ref Range    DIGOXIN 0.5 (L) 0.9 - 2.0 NG/ML   TSH 3RD GENERATION    Collection Time: 04/06/17  5:30 AM   Result Value Ref Range    TSH 4.77 (H) 0.36 - 3.74 uIU/mL         Assessment:     Active Problems: A[facility]iliac occlusive disease ([facility] (1/25/2017)      Atherosclerosis of native artery of both lower extremities with intermittent claudication ([facility] [address] (1/25/2017)      PAD (peripheral artery disease) ([facility] [address] (1/25/2017)        Plan/Recommendations/Medical Decision Making:     Continue current treatment   Will have to continue to monitor right foot. Hopeful for improvement. Renal function appears to be improving. Appreciate Renal and Cards assistance. PT/OT. OOB as able.      Elvia Harris  [phone]

## 2017-04-06 NOTE — PROGRESS NOTES
[facility]  -  Progress Note      Patient: Francy Fontaine MRN: [id number]  SSN: [id number]    YOB: 1960  Age: 59 y.o. Sex: male      Admit Date: 4/4/2017    Assessment:     Hospital Problems  Date Reviewed: 4/4/2017          Codes Class Noted POA    Aortoiliac occlusive disease ([facility] ICD-10-CM: I74.09  ICD-9-CM: 444.09  1/25/2017 Unknown        Atherosclerosis of native artery of both lower extremities with intermittent claudication ([facility] ICD-10-CM: M41.548  ICD-9-CM: 440.21  1/25/2017 Unknown        PAD (peripheral artery disease) ([facility] ICD-10-CM: I73.9  ICD-9-CM: 443.9  1/25/2017 Unknown          - A.fib w/RVR in setting of known persistent atrial fibrillation. Heart rate was elevated as outpatient as well. Asymptomatic.   - PAD, s/p aortoiliac occlusive disease s/p R axillary fem bypass 4/4/17  - Acute Kidney Injury; Cr 4  - Cardiomyopathy, ischemic. ECHO 2/24/17 EF 35-40% mod. idffuse hypokinesis  - s/p cath 3/15/17 with drug eluding stents to LCx and RCA  - Asymptomatic carotid artery disease  - Essential Hypertension  - Hoarse voice; over the past month  - Weight loss; 20lbs over the past month    Plan:     1. Start Cardizem IV bolus and drip. 2. Give one dose of digoxin IV 0.25 mg today since all he has gotten so far is digoxin 0.25 mg IV yesterday and patient's with renal failure usually need 0.5 and 0.75 mg in 24 hours to be adequately digitalized which is 1/2 the normal dose in patients with normal renal function. He will then start 0.125 mg 3 times per week M/W/F tomorrow on Friday AM.  3. Possible transfer to stepdown if rate controlled on IV Cardizem. Subjective:     No new complaints. Sitting up in chair and comfortable. Still with rapid atrial fib in the 120-130 range, but adequate blood pressure in the 602 to 341 systolic range. Continues with leukocytosis and left shift, but afebrile on Ancef.       Objective:      CBC with H&H 9.0 and 24.6 with WBC of 17.1 and left shift. Sodium low at 124 today and BUN/Creat 44/3.74 slightly better than creat of 4.0 yesterday. Mag low at 1.4   CK high at 5199 with positive MB fraction, but negative index and troponin only 0.04. Digoxin level low at 0.5 same as yesterday.     Patient Vitals for the past 8 hrs:   Temp Pulse Resp SpO2   04/06/17 0830 - (!) 126 (!) 36 98 %   04/06/17 0800 - (!) 120 (!) 35 98 %   04/06/17 0730 - (!) 128 (!) 39 96 %   04/06/17 0700 - (!) 130 24 99 %   04/06/17 0500 - (!) 128 18 97 %   04/06/17 0400 - (!) 132 (!) 33 97 %   04/06/17 0306 98.1 °F (36.7 °C) - - -   04/06/17 0300 - (!) 124 21 95 %   04/06/17 0200 - (!) 126 27 93 %         Patient Vitals for the past 96 hrs:   Weight   04/04/17 0722 77.1 kg (170 lb)         Intake/Output Summary (Last 24 hours) at 04/06/17 0931  Last data filed at 04/06/17 0708   Gross per 24 hour   Intake              900 ml   Output             1660 ml   Net             -760 ml       Physical Exam:  General:  alert, cooperative, no distress, appears stated age  Neck:  no JVD  Lungs:  clear to auscultation bilaterally  Heart:  regular rate and rhythm, S1, S2 normal, no murmur, click, rub or gallop  Abdomen:  abdomen is soft without significant tenderness, masses, organomegaly or guarding  Extremities:  extremities normal, atraumatic, no cyanosis or edema    Data Review:     Labs: Results:       Chemistry Recent Labs      04/06/17   0530  04/05/17   2126  04/05/17   0432  04/04/17   1900   GLU  87   --   112*  128*   NA  124*   --   125*  126*   K  4.7   --   3.9  3.4*   CL  91*   --   93*  91*   CO2  19*   --   20*  23   BUN  44*   --   35*  29*   CREA  3.74*   --   4.00*  3.78*   CA  7.5*  7.6*  7.4*  7.1*   MG  1.4*   --    --    --    PHOS  3.1   --    --    --    AGAP  14   --   12  12   BUCR  12   --   9*  8*      CBC w/Diff Recent Labs      04/06/17   0530  04/05/17   0432  04/04/17   1900   WBC  17.1*  18.4*  17.5*   RBC  3.01*  3.24*  3.26*   HGB  9.0*  9.8*  9.8*   HCT 24.6*  26.9*  27.1*   PLT  375  327  301   GRANS  88*   --    --    LYMPH  7*   --    --    EOS  0   --    --       Cardiac Enzymes Lab Results   Component Value Date/Time    CPK 5199 (H) 04/05/2017 09:26 PM    CKMB 91.3 (H) 04/05/2017 09:26 PM    CKND1 1.8 04/05/2017 09:26 PM    TROIQ 0.04 04/05/2017 09:26 PM      Coagulation No results for input(s): PTP, INR, APTT in the last 72 hours. No lab exists for component: INREXT    Lipid Panel Lab Results   Component Value Date/Time    Cholesterol, total 157 12/29/2015 07:18 AM    HDL Cholesterol 28 12/29/2015 07:18 AM    LDL, calculated 112.4 12/29/2015 07:18 AM    VLDL, calculated 16.6 12/29/2015 07:18 AM    Triglyceride 83 12/29/2015 07:18 AM    CHOL/HDL Ratio 5.6 12/29/2015 07:18 AM      BNP No results found for: BNP, BNPP, XBNPT   Liver Enzymes No results for input(s): TP, ALB, TBIL, AP, SGOT, GPT in the last 72 hours.     No lab exists for component: DBIL   Digoxin    Thyroid Studies Lab Results   Component Value Date/Time    TSH 4.77 04/06/2017 05:30 AM          Gilmar Sorto DO   April 6, 2017

## 2017-04-06 NOTE — PROGRESS NOTES
Received bedside report on patient for progression of care by Rodri Qureshi, Report included SBAR<MAR, Kardex, Summary Report and recent results, Patient is sitting in recliner, denies any pain, call bell is in reach.

## 2017-04-07 ENCOUNTER — APPOINTMENT (OUTPATIENT)
Dept: GENERAL RADIOLOGY | Age: 57
DRG: 253 | End: 2017-04-07
Attending: SURGERY
Payer: COMMERCIAL

## 2017-04-07 ENCOUNTER — ANESTHESIA (OUTPATIENT)
Dept: CARDIOTHORACIC SURGERY | Age: 57
DRG: 253 | End: 2017-04-07
Payer: COMMERCIAL

## 2017-04-07 ENCOUNTER — ANESTHESIA EVENT (OUTPATIENT)
Dept: CARDIOTHORACIC SURGERY | Age: 57
DRG: 253 | End: 2017-04-07
Payer: COMMERCIAL

## 2017-04-07 LAB
ANION GAP BLD CALC-SCNC: 11 MMOL/L (ref 3–18)
APPEARANCE UR: CLEAR
BACTERIA URNS QL MICRO: ABNORMAL /HPF
BASOPHILS # BLD AUTO: 0 K/UL (ref 0–0.06)
BASOPHILS # BLD: 0 % (ref 0–3)
BILIRUB UR QL: NEGATIVE
BUN SERPL-MCNC: 41 MG/DL (ref 7–18)
BUN/CREAT SERPL: 15 (ref 12–20)
CALCIUM SERPL-MCNC: 7.9 MG/DL (ref 8.5–10.1)
CHLORIDE SERPL-SCNC: 91 MMOL/L (ref 100–108)
CK SERPL-CCNC: 3041 U/L (ref 39–308)
CO2 SERPL-SCNC: 27 MMOL/L (ref 21–32)
COLOR UR: YELLOW
CREAT SERPL-MCNC: 2.75 MG/DL (ref 0.6–1.3)
DIFFERENTIAL METHOD BLD: ABNORMAL
DIGOXIN SERPL-MCNC: 0.9 NG/ML (ref 0.9–2)
DIGOXIN SERPL-MCNC: 2.8 NG/ML (ref 0.9–2)
EOSINOPHIL # BLD: 0 K/UL (ref 0–0.4)
EOSINOPHIL NFR BLD: 0 % (ref 0–5)
EPITH CASTS URNS QL MICRO: ABNORMAL /LPF (ref 0–5)
ERYTHROCYTE [DISTWIDTH] IN BLOOD BY AUTOMATED COUNT: 14.1 % (ref 11.6–14.5)
GLUCOSE BLD STRIP.AUTO-MCNC: 133 MG/DL (ref 70–110)
GLUCOSE SERPL-MCNC: 102 MG/DL (ref 74–99)
GLUCOSE UR STRIP.AUTO-MCNC: NEGATIVE MG/DL
HCT VFR BLD AUTO: 24.3 % (ref 36–48)
HGB BLD-MCNC: 8.8 G/DL (ref 13–16)
HGB UR QL STRIP: ABNORMAL
KETONES UR QL STRIP.AUTO: NEGATIVE MG/DL
LEUKOCYTE ESTERASE UR QL STRIP.AUTO: ABNORMAL
LYMPHOCYTES # BLD AUTO: 7 % (ref 20–51)
LYMPHOCYTES # BLD: 1.1 K/UL (ref 0.8–3.5)
MAGNESIUM SERPL-MCNC: 1.8 MG/DL (ref 1.6–2.6)
MCH RBC QN AUTO: 29.8 PG (ref 24–34)
MCHC RBC AUTO-ENTMCNC: 36.2 G/DL (ref 31–37)
MCV RBC AUTO: 82.4 FL (ref 74–97)
MONOCYTES # BLD: 1.3 K/UL (ref 0–1)
MONOCYTES NFR BLD AUTO: 8 % (ref 2–9)
NEUTS SEG # BLD: 13.5 K/UL (ref 1.8–8)
NEUTS SEG NFR BLD AUTO: 85 % (ref 42–75)
NITRITE UR QL STRIP.AUTO: NEGATIVE
PH UR STRIP: 7 [PH] (ref 5–8)
PHOSPHATE SERPL-MCNC: 2.7 MG/DL (ref 2.5–4.9)
PLATELET # BLD AUTO: 339 K/UL (ref 135–420)
PLATELET COMMENTS,PCOM: ABNORMAL
PMV BLD AUTO: 9.4 FL (ref 9.2–11.8)
POTASSIUM SERPL-SCNC: 3.4 MMOL/L (ref 3.5–5.5)
PROT UR STRIP-MCNC: NEGATIVE MG/DL
RBC # BLD AUTO: 2.95 M/UL (ref 4.7–5.5)
RBC #/AREA URNS HPF: ABNORMAL /HPF (ref 0–5)
RBC MORPH BLD: ABNORMAL
RBC MORPH BLD: ABNORMAL
SODIUM SERPL-SCNC: 129 MMOL/L (ref 136–145)
SP GR UR REFRACTOMETRY: 1.01 (ref 1–1.03)
UROBILINOGEN UR QL STRIP.AUTO: 0.2 EU/DL (ref 0.2–1)
WBC # BLD AUTO: 15.9 K/UL (ref 4.6–13.2)
WBC URNS QL MICRO: ABNORMAL /HPF (ref 0–4)

## 2017-04-07 PROCEDURE — C1894 INTRO/SHEATH, NON-LASER: HCPCS | Performed by: SURGERY

## 2017-04-07 PROCEDURE — 77030008584 HC TOOL GDWRE DEV TERU -A: Performed by: SURGERY

## 2017-04-07 PROCEDURE — 74011250636 HC RX REV CODE- 250/636: Performed by: INTERNAL MEDICINE

## 2017-04-07 PROCEDURE — 85025 COMPLETE CBC W/AUTO DIFF WBC: CPT | Performed by: INTERNAL MEDICINE

## 2017-04-07 PROCEDURE — C1892 INTRO/SHEATH,FIXED,PEEL-AWAY: HCPCS | Performed by: SURGERY

## 2017-04-07 PROCEDURE — 80162 ASSAY OF DIGOXIN TOTAL: CPT | Performed by: INTERNAL MEDICINE

## 2017-04-07 PROCEDURE — 87086 URINE CULTURE/COLONY COUNT: CPT | Performed by: INTERNAL MEDICINE

## 2017-04-07 PROCEDURE — 74011000258 HC RX REV CODE- 258: Performed by: INTERNAL MEDICINE

## 2017-04-07 PROCEDURE — 74011000250 HC RX REV CODE- 250: Performed by: INTERNAL MEDICINE

## 2017-04-07 PROCEDURE — 74011250636 HC RX REV CODE- 250/636

## 2017-04-07 PROCEDURE — 74011250636 HC RX REV CODE- 250/636: Performed by: SURGERY

## 2017-04-07 PROCEDURE — 74011250637 HC RX REV CODE- 250/637: Performed by: SURGERY

## 2017-04-07 PROCEDURE — C1769 GUIDE WIRE: HCPCS | Performed by: SURGERY

## 2017-04-07 PROCEDURE — 83735 ASSAY OF MAGNESIUM: CPT | Performed by: INTERNAL MEDICINE

## 2017-04-07 PROCEDURE — 36415 COLL VENOUS BLD VENIPUNCTURE: CPT | Performed by: INTERNAL MEDICINE

## 2017-04-07 PROCEDURE — 77030011222 HC DEV INFL PTCA BSC -B: Performed by: SURGERY

## 2017-04-07 PROCEDURE — 77030011640 HC PAD GRND REM COVD -A: Performed by: SURGERY

## 2017-04-07 PROCEDURE — 82962 GLUCOSE BLOOD TEST: CPT

## 2017-04-07 PROCEDURE — 82550 ASSAY OF CK (CPK): CPT | Performed by: INTERNAL MEDICINE

## 2017-04-07 PROCEDURE — 77030002996 HC SUT SLK J&J -A: Performed by: SURGERY

## 2017-04-07 PROCEDURE — 84100 ASSAY OF PHOSPHORUS: CPT | Performed by: INTERNAL MEDICINE

## 2017-04-07 PROCEDURE — 76010000129 HC CV SURG 1.5 TO 2 HR: Performed by: SURGERY

## 2017-04-07 PROCEDURE — 74011000250 HC RX REV CODE- 250

## 2017-04-07 PROCEDURE — 80048 BASIC METABOLIC PNL TOTAL CA: CPT | Performed by: INTERNAL MEDICINE

## 2017-04-07 PROCEDURE — 65620000000 HC RM CCU GENERAL

## 2017-04-07 PROCEDURE — 77030003390 HC NDL ANGI MRTM -A: Performed by: SURGERY

## 2017-04-07 PROCEDURE — 81001 URINALYSIS AUTO W/SCOPE: CPT | Performed by: INTERNAL MEDICINE

## 2017-04-07 PROCEDURE — 77030010509 HC AIRWY LMA MSK TELE -A: Performed by: ANESTHESIOLOGY

## 2017-04-07 PROCEDURE — 76060000034 HC ANESTHESIA 1.5 TO 2 HR: Performed by: SURGERY

## 2017-04-07 PROCEDURE — 75710 ARTERY X-RAYS ARM/LEG: CPT

## 2017-04-07 PROCEDURE — 77030013079 HC BLNKT BAIR HGGR 3M -A: Performed by: ANESTHESIOLOGY

## 2017-04-07 PROCEDURE — 77030004530 HC CATH ANGI DX IMGR BSC -A: Performed by: SURGERY

## 2017-04-07 RX ORDER — LIDOCAINE HYDROCHLORIDE 10 MG/ML
INJECTION, SOLUTION EPIDURAL; INFILTRATION; INTRACAUDAL; PERINEURAL
Status: DISPENSED
Start: 2017-04-07 | End: 2017-04-08

## 2017-04-07 RX ORDER — HEPARIN SODIUM 1000 [USP'U]/ML
INJECTION, SOLUTION INTRAVENOUS; SUBCUTANEOUS AS NEEDED
Status: DISCONTINUED | OUTPATIENT
Start: 2017-04-07 | End: 2017-04-07 | Stop reason: HOSPADM

## 2017-04-07 RX ORDER — ONDANSETRON 2 MG/ML
INJECTION INTRAMUSCULAR; INTRAVENOUS AS NEEDED
Status: DISCONTINUED | OUTPATIENT
Start: 2017-04-07 | End: 2017-04-07 | Stop reason: HOSPADM

## 2017-04-07 RX ORDER — MIDAZOLAM HYDROCHLORIDE 1 MG/ML
INJECTION, SOLUTION INTRAMUSCULAR; INTRAVENOUS AS NEEDED
Status: DISCONTINUED | OUTPATIENT
Start: 2017-04-07 | End: 2017-04-07 | Stop reason: HOSPADM

## 2017-04-07 RX ORDER — DILTIAZEM HYDROCHLORIDE 5 MG/ML
INJECTION INTRAVENOUS
Status: COMPLETED
Start: 2017-04-07 | End: 2017-04-07

## 2017-04-07 RX ORDER — HEPARIN SODIUM 200 [USP'U]/100ML
INJECTION, SOLUTION INTRAVENOUS
Status: DISPENSED
Start: 2017-04-07 | End: 2017-04-08

## 2017-04-07 RX ORDER — LIDOCAINE HYDROCHLORIDE 20 MG/ML
INJECTION, SOLUTION EPIDURAL; INFILTRATION; INTRACAUDAL; PERINEURAL AS NEEDED
Status: DISCONTINUED | OUTPATIENT
Start: 2017-04-07 | End: 2017-04-07 | Stop reason: HOSPADM

## 2017-04-07 RX ORDER — FENTANYL CITRATE 50 UG/ML
INJECTION, SOLUTION INTRAMUSCULAR; INTRAVENOUS AS NEEDED
Status: DISCONTINUED | OUTPATIENT
Start: 2017-04-07 | End: 2017-04-07 | Stop reason: HOSPADM

## 2017-04-07 RX ORDER — POTASSIUM CHLORIDE 7.45 MG/ML
10 INJECTION INTRAVENOUS ONCE
Status: COMPLETED | OUTPATIENT
Start: 2017-04-07 | End: 2017-04-07

## 2017-04-07 RX ORDER — IODIXANOL 320 MG/ML
INJECTION, SOLUTION INTRAVASCULAR
Status: DISPENSED
Start: 2017-04-07 | End: 2017-04-08

## 2017-04-07 RX ORDER — MAGNESIUM SULFATE HEPTAHYDRATE 40 MG/ML
2 INJECTION, SOLUTION INTRAVENOUS ONCE
Status: COMPLETED | OUTPATIENT
Start: 2017-04-07 | End: 2017-04-07

## 2017-04-07 RX ORDER — DIGOXIN 125 MCG
0.12 TABLET ORAL
Status: DISCONTINUED | OUTPATIENT
Start: 2017-04-07 | End: 2017-04-09

## 2017-04-07 RX ORDER — CEFAZOLIN SODIUM 1 G/3ML
INJECTION, POWDER, FOR SOLUTION INTRAMUSCULAR; INTRAVENOUS AS NEEDED
Status: DISCONTINUED | OUTPATIENT
Start: 2017-04-07 | End: 2017-04-07 | Stop reason: HOSPADM

## 2017-04-07 RX ORDER — DIGOXIN 125 MCG
0.12 TABLET ORAL
Status: DISCONTINUED | OUTPATIENT
Start: 2017-04-10 | End: 2017-04-07

## 2017-04-07 RX ORDER — DILTIAZEM HYDROCHLORIDE 5 MG/ML
20 INJECTION INTRAVENOUS
Status: COMPLETED | OUTPATIENT
Start: 2017-04-07 | End: 2017-04-07

## 2017-04-07 RX ORDER — PROPOFOL 10 MG/ML
INJECTION, EMULSION INTRAVENOUS AS NEEDED
Status: DISCONTINUED | OUTPATIENT
Start: 2017-04-07 | End: 2017-04-07 | Stop reason: HOSPADM

## 2017-04-07 RX ADMIN — SODIUM CHLORIDE 10 MG/HR: 900 INJECTION, SOLUTION INTRAVENOUS at 14:40

## 2017-04-07 RX ADMIN — SODIUM CHLORIDE 10 MG/HR: 900 INJECTION, SOLUTION INTRAVENOUS at 03:49

## 2017-04-07 RX ADMIN — MIDAZOLAM HYDROCHLORIDE 2 MG: 1 INJECTION, SOLUTION INTRAMUSCULAR; INTRAVENOUS at 18:37

## 2017-04-07 RX ADMIN — METOPROLOL TARTRATE 25 MG: 25 TABLET ORAL at 21:27

## 2017-04-07 RX ADMIN — Medication 10 ML: at 13:02

## 2017-04-07 RX ADMIN — HEPARIN SODIUM 5000 UNITS: 5000 INJECTION, SOLUTION INTRAVENOUS; SUBCUTANEOUS at 06:49

## 2017-04-07 RX ADMIN — Medication 10 ML: at 21:56

## 2017-04-07 RX ADMIN — POTASSIUM CHLORIDE 10 MEQ: 10 INJECTION, SOLUTION INTRAVENOUS at 13:02

## 2017-04-07 RX ADMIN — LIDOCAINE HYDROCHLORIDE 100 MG: 20 INJECTION, SOLUTION EPIDURAL; INFILTRATION; INTRACAUDAL; PERINEURAL at 18:37

## 2017-04-07 RX ADMIN — HEPARIN SODIUM 5000 UNITS: 5000 INJECTION, SOLUTION INTRAVENOUS; SUBCUTANEOUS at 00:03

## 2017-04-07 RX ADMIN — ACETAMINOPHEN 650 MG: 325 TABLET ORAL at 21:27

## 2017-04-07 RX ADMIN — DILTIAZEM HYDROCHLORIDE 20 MG: 5 INJECTION INTRAVENOUS at 18:31

## 2017-04-07 RX ADMIN — CEFAZOLIN SODIUM 2 G: 1 INJECTION, POWDER, FOR SOLUTION INTRAMUSCULAR; INTRAVENOUS at 19:00

## 2017-04-07 RX ADMIN — Medication 2 MG: at 21:50

## 2017-04-07 RX ADMIN — HEPARIN SODIUM 5000 UNITS: 5000 INJECTION, SOLUTION INTRAVENOUS; SUBCUTANEOUS at 21:28

## 2017-04-07 RX ADMIN — Medication 10 ML: at 06:50

## 2017-04-07 RX ADMIN — PROPOFOL 150 MG: 10 INJECTION, EMULSION INTRAVENOUS at 18:37

## 2017-04-07 RX ADMIN — FENTANYL CITRATE 25 MCG: 50 INJECTION, SOLUTION INTRAMUSCULAR; INTRAVENOUS at 18:59

## 2017-04-07 RX ADMIN — METOPROLOL TARTRATE 25 MG: 25 TABLET ORAL at 00:03

## 2017-04-07 RX ADMIN — DEXTROSE MONOHYDRATE: 5 INJECTION, SOLUTION INTRAVENOUS at 10:01

## 2017-04-07 RX ADMIN — MAGNESIUM SULFATE HEPTAHYDRATE 2 G: 40 INJECTION, SOLUTION INTRAVENOUS at 13:02

## 2017-04-07 RX ADMIN — Medication 10 ML: at 00:04

## 2017-04-07 RX ADMIN — HEPARIN SODIUM 3000 UNITS: 1000 INJECTION, SOLUTION INTRAVENOUS; SUBCUTANEOUS at 19:27

## 2017-04-07 RX ADMIN — ONDANSETRON 4 MG: 2 INJECTION INTRAMUSCULAR; INTRAVENOUS at 18:55

## 2017-04-07 RX ADMIN — FENTANYL CITRATE 25 MCG: 50 INJECTION, SOLUTION INTRAMUSCULAR; INTRAVENOUS at 18:50

## 2017-04-07 RX ADMIN — FENTANYL CITRATE 50 MCG: 50 INJECTION, SOLUTION INTRAMUSCULAR; INTRAVENOUS at 18:44

## 2017-04-07 RX ADMIN — METOPROLOL TARTRATE 25 MG: 25 TABLET ORAL at 06:50

## 2017-04-07 NOTE — PROGRESS NOTES
0730 Bedside  And verbal shift change report given to BEAU Spicer(oncoming Nurse)by BEAU Reid(offgoing Nurse)Report included the following information SBAR,Kardex,OR Summary,Procedure Summary,Intake/Output,MAR,recent results and Cardiac Rhythm Afib. Patient is  well controlled on Cardizem drip. Patient denies any pain, or distress, CB is in reach.

## 2017-04-07 NOTE — ROUTINE PROCESS
Reminded client he was no longer able to have anything to eat or drink from this point forward in preparation for surgery. Verbalized understanding.

## 2017-04-07 NOTE — PROGRESS NOTES
RENAL DAILY PROGRESS NOTE      IMPRESSION:   Acute kidney injury, multiple risk factors including  REMI, ? Atherosclerotic emboli, -- improving  Creatinine, good urine output  Hyponatremia, improving   Metabolic acidosis, improving  Elevated CK-- improving  Hypomagnesemia   Hypokalemia, mild  Hypocalcemia, with normal phos level, Low ionized calcium   PLAN:    Continue bicarbonate drip for today . Ordered mg supplement  Monitor calcium as recovering from rhabdo. Adjust all meds per current renal function. Subjective:     59y M with MAYRA   Complaint:   Overnight events noted  Feels better today   no nausea, vomiting, chest pain, short of breath, cough, seizure.      Current Facility-Administered Medications   Medication Dose Route Frequency    [START ON 4/10/2017] digoxin (LANOXIN) tablet 0.125 mg  0.125 mg Oral Q MON, WED & FRI    potassium chloride 10 mEq in 100 ml IVPB  10 mEq IntraVENous ONCE    dilTIAZem (CARDIZEM) 100 mg in 0.9% sodium chloride (MBP/ADV) 100 mL infusion  10 mg/hr IntraVENous TITRATE    sodium bicarbonate (8.4%) 100 mEq in dextrose 5% 1,000 mL infusion   IntraVENous CONTINUOUS    PHENYLephrine 30 mg in 0.9% sodium chloride 250 mL (ANISH-SYNEPHRINE) infusion   mcg/min IntraVENous TITRATE    sodium chloride (NS) flush 5-10 mL  5-10 mL IntraVENous PRN    glucose chewable tablet 16 g  4 Tab Oral PRN    glucagon (GLUCAGEN) injection 1 mg  1 mg IntraMUSCular PRN    dextrose (D50W) injection syrg 12.5-25 g  25-50 mL IntraVENous PRN    fentaNYL citrate (PF) injection 50 mcg  50 mcg IntraVENous Q10MIN PRN    HYDROmorphone (PF) (DILAUDID) injection 0.5 mg  0.5 mg IntraVENous PRN    aspirin chewable tablet 81 mg  81 mg Oral DAILY    atorvastatin (LIPITOR) tablet 40 mg  40 mg Oral DAILY    clopidogrel (PLAVIX) tablet 75 mg  75 mg Oral DAILY    cyanocobalamin tablet 1,000 mcg  1,000 mcg Oral DAILY    sodium chloride (NS) flush 5-10 mL  5-10 mL IntraVENous Q8H    sodium chloride (NS) flush 5-10 mL  5-10 mL IntraVENous PRN    acetaminophen (TYLENOL) tablet 650 mg  650 mg Oral Q4H PRN    oxyCODONE-acetaminophen (PERCOCET) 5-325 mg per tablet 1 Tab  1 Tab Oral Q4H PRN    morphine injection 2 mg  2 mg IntraVENous Q2H PRN    naloxone (NARCAN) injection 0.4 mg  0.4 mg IntraVENous PRN    ondansetron (ZOFRAN) injection 4 mg  4 mg IntraVENous Q4H PRN    diphenhydrAMINE (BENADRYL) injection 12.5 mg  12.5 mg IntraVENous Q4H PRN    docusate sodium (COLACE) capsule 100 mg  100 mg Oral BID    heparin (porcine) injection 5,000 Units  5,000 Units SubCUTAneous Q8H    metoprolol tartrate (LOPRESSOR) tablet 25 mg  25 mg Oral Q8H       Review of Symptoms: comprehensive ROS negative except above.    Objective:   Patient Vitals for the past 24 hrs:   Temp Pulse Resp BP SpO2   04/07/17 0800 - 76 (!) 44 112/73 97 %   04/07/17 0700 98.4 °F (36.9 °C) 80 (!) 64 107/66 98 %   04/07/17 0600 - 92 28 137/61 -   04/07/17 0500 - 82 22 124/62 98 %   04/07/17 0453 97.9 °F (36.6 °C) - - - -   04/07/17 0400 - 80 24 110/69 100 %   04/07/17 0300 - 78 26 116/79 97 %   04/07/17 0200 - 94 (!) 36 105/60 97 %   04/07/17 0100 - 78 21 127/63 98 %   04/07/17 0030 - 88 24 - 98 %   04/07/17 0014 97.9 °F (36.6 °C) - - - -   04/07/17 0000 - 90 28 117/67 99 %   04/06/17 2330 - 86 21 - 98 %   04/06/17 2300 - (!) 104 (!) 33 109/66 99 %   04/06/17 2230 - 88 25 - 100 %   04/06/17 2200 - 86 24 102/66 100 %   04/06/17 2130 - 86 (!) 35 - 97 %   04/06/17 2100 - 98 (!) 32 108/65 95 %   04/06/17 2030 - 78 19 - 100 %   04/06/17 2000 - 90 (!) 32 96/49 100 %   04/06/17 1944 97.8 °F (36.6 °C) - - - -   04/06/17 1930 - 96 27 - 100 %   04/06/17 1900 - 90 29 101/62 100 %   04/06/17 1800 98.1 °F (36.7 °C) 98 27 117/65 100 %   04/06/17 1700 98.2 °F (36.8 °C) 82 28 111/66 99 %   04/06/17 1600 98.2 °F (36.8 °C) 88 (!) 36 111/66 99 %   04/06/17 1500 97.5 °F (36.4 °C) 84 22 111/66 94 %   04/06/17 1400 - (!) 130 (!) 35 112/72 96 %   04/06/17 1300 98.1 °F (36.7 °C) (!) 114 (!) 34 101/64 90 %        Weight change:      04/05 1901 - 04/07 0700  In: 860 [P.O.:260; I.V.:600]  Out: 4285 [Urine:4285]    Intake/Output Summary (Last 24 hours) at 04/07/17 1208  Last data filed at 04/07/17 0925   Gross per 24 hour   Intake                0 ml   Output             2575 ml   Net            -2575 ml     Physical Exam:   General: comfortable, no acute distress   HEENT sclera anicteric, supple neck, no thyromegaly  CVS: S1S2 heard,  no rub  RS: + air entry b/l,   Abd: Soft, Non tender,   Neuro: non focal, awake, alert ,   Extrm:no  edema, no cyanosis, clubbing   Skin: dry          Data Review:     LABS:   Hematology: Recent Labs      04/07/17   0610  04/06/17   0530  04/05/17   0432  04/04/17   1900   WBC  15.9*  17.1*  18.4*  17.5*   HGB  8.8*  9.0*  9.8*  9.8*   HCT  24.3*  24.6*  26.9*  27.1*     Chemistry: Recent Labs      04/07/17   0610  04/06/17   0530  04/05/17 2126  04/05/17   0432  04/04/17   1900   BUN  41*  44*   --   35*  29*   CREA  2.75*  3.74*   --   4.00*  3.78*   CA  7.9*  7.5*  7.6*  7.4*  7.1*   K  3.4*  4.7   --   3.9  3.4*   NA  129*  124*   --   125*  126*   CL  91*  91*   --   93*  91*   CO2  27  19*   --   20*  23   PHOS  2.7  3.1   --    --    --    GLU  102*  87   --   112*  128*              Procedures/imaging: see electronic medical records for all procedures, Xrays and details which were not copied into this note but were reviewed prior to creation of Plan          Assessment & Plan:     As above         Yarely Crowe MD  4/7/2017  12:08 PM

## 2017-04-07 NOTE — PROGRESS NOTES
[facility]  -  Progress Note      Patient: Dayo Delcid MRN: [id number]  SSN: [id number]    YOB: 1960  Age: 59 y.o. Sex: male      Admit Date: 4/4/2017    Assessment:     Hospital Problems  Date Reviewed: 4/4/2017          Codes Class Noted POA    Aortoiliac occlusive disease ([facility] ICD-10-CM: I74.09  ICD-9-CM: 444.09  1/25/2017 Unknown        Atherosclerosis of native artery of both lower extremities with intermittent claudication ([facility] ICD-10-CM: O65.167  ICD-9-CM: 440.21  1/25/2017 Unknown        PAD (peripheral artery disease) ([facility] ICD-10-CM: I73.9  ICD-9-CM: 443.9  1/25/2017 Unknown                     - A.fib w/RVR in setting of known persistent atrial fibrillation. Heart rate was elevated as outpatient as well. Asymptomatic.   - PAD, s/p aortoiliac occlusive disease s/p R axillary fem bypass 4/4/17  - Acute Kidney Injury; Cr 4  - Cardiomyopathy, ischemic. ECHO 2/24/17 EF 35-40% mod. idffuse hypokinesis  - s/p cath 3/15/17 with drug eluding stents to LCx and RCA  - Asymptomatic carotid artery disease  - Essential Hypertension  - Hoarse voice; over the past month  - Weight loss; 20lbs over the past month      Plan:     1. Repeat digoxin level late this AM to confirm high digoxin level. This seems higher than would be expected since reportedly he had 0.25 mg IV on 4/5/2017 and again on 4/6/2017 and these were reportedly the only doses that he had and was not on the medication on admission. 2. No clinical signs of dig toxicity despite elevated level. 3. Replace K+  4. Hold po digoxin today. 5. Continue Cardizem drip for now and if heart rate persistently under 90 would decrease Cardizem drip to 5 mg per hour. Subjective:     No new complaints. NPO for possible CO2 angio of right leg later today. Lying flat with heart rates in the 80's in atrial fibrillation. Objective:     CPK 3041 down from 5199 on 4/5/2017.   H&H 8.8/24.3 stable with WBC 15.9 down from 17.1 yesterday  Digoxin level at 0610 was 2.8 which was done about 19 hours after last IV dose.         Patient Vitals for the past 8 hrs:   Temp Pulse Resp BP SpO2   04/07/17 0700 98.4 °F (36.9 °C) - - - -   04/07/17 0600 - 92 28 137/61 -   04/07/17 0500 - 82 22 124/62 98 %   04/07/17 0453 97.9 °F (36.6 °C) - - - -   04/07/17 0400 - 80 24 110/69 100 %   04/07/17 0300 - 78 26 116/79 97 %   04/07/17 0200 - 94 (!) 36 105/60 97 %   04/07/17 0100 - 78 21 127/63 98 %         Patient Vitals for the past 96 hrs:   Weight   04/07/17 0615 81.6 kg (179 lb 14.4 oz)   04/04/17 0722 77.1 kg (170 lb)         Intake/Output Summary (Last 24 hours) at 04/07/17 0840  Last data filed at 04/07/17 9213   Gross per 24 hour   Intake              260 ml   Output             2850 ml   Net            -2590 ml       Physical Exam:  General:  alert, cooperative, no distress, appears stated age  Neck:  no JVD  Lungs:  clear to auscultation bilaterally  Heart:  regular rate and rhythm, S1, S2 normal, no murmur, click, rub or gallop  Abdomen:  abdomen is soft without significant tenderness, masses, organomegaly or guarding  Extremities:  extremities normal, atraumatic, no cyanosis or edema    Data Review:     Labs: Results:       Chemistry Recent Labs      04/07/17   0610  04/06/17   0530  04/05/17   2126  04/05/17   0432   GLU  102*  87   --   112*   NA  129*  124*   --   125*   K  3.4*  4.7   --   3.9   CL  91*  91*   --   93*   CO2  27  19*   --   20*   BUN  41*  44*   --   35*   CREA  2.75*  3.74*   --   4.00*   CA  7.9*  7.5*  7.6*  7.4*   MG  1.8  1.4*   --    --    PHOS  2.7  3.1   --    --    AGAP  11  14   --   12   BUCR  15  12   --   9*      CBC w/Diff Recent Labs      04/07/17   0610  04/06/17   0530  04/05/17   0432   WBC  15.9*  17.1*  18.4*   RBC  2.95*  3.01*  3.24*   HGB  8.8*  9.0*  9.8*   HCT  24.3*  24.6*  26.9*   PLT  339  375  327   GRANS  85*  88*   --    LYMPH  7*  7*   --    EOS  0  0   --       Cardiac Enzymes Lab Results Component Value Date/Time    CPK 3041 (H) 04/07/2017 06:10 AM      Coagulation No results for input(s): PTP, INR, APTT in the last 72 hours. No lab exists for component: INREXT    Lipid Panel Lab Results   Component Value Date/Time    Cholesterol, total 157 12/29/2015 07:18 AM    HDL Cholesterol 28 12/29/2015 07:18 AM    LDL, calculated 112.4 12/29/2015 07:18 AM    VLDL, calculated 16.6 12/29/2015 07:18 AM    Triglyceride 83 12/29/2015 07:18 AM    CHOL/HDL Ratio 5.6 12/29/2015 07:18 AM      BNP No results found for: BNP, BNPP, XBNPT   Liver Enzymes No results for input(s): TP, ALB, TBIL, AP, SGOT, GPT in the last 72 hours.     No lab exists for component: DBIL   Digoxin    Thyroid Studies Lab Results   Component Value Date/Time    TSH 4.77 04/06/2017 05:30 AM          Kareen Thao DO   April 7, 2017

## 2017-04-07 NOTE — ANESTHESIA PREPROCEDURE EVALUATION
Anesthetic History   No history of anesthetic complications            Review of Systems / Medical History  Patient summary reviewed and pertinent labs reviewed    Pulmonary  Within defined limits                 Neuro/Psych              Cardiovascular    Hypertension        Dysrhythmias : atrial fibrillation  CAD, PAD and cardiac stents         GI/Hepatic/Renal                Endo/Other        Arthritis     Other Findings   Comments:   Risk Factors for Postoperative nausea/vomiting:       History of postoperative nausea/vomiting? NO       Female? NO       Motion sickness? NO       Intended opioid administration for postoperative analgesia?   YES           Physical Exam    Airway  Mallampati: II  TM Distance: 4 - 6 cm  Neck ROM: normal range of motion   Mouth opening: Normal     Cardiovascular    Rhythm: irregular  Rate: normal         Dental    Dentition: Edentulous     Pulmonary      Decreased breath sounds: bilateral           Abdominal  GI exam deferred       Other Findings            Anesthetic Plan    ASA: 3  Anesthesia type: MAC            Anesthetic plan and risks discussed with: Patient

## 2017-04-07 NOTE — ROUTINE PROCESS
Bedside and Verbal shift change report given to Barbara Haines, [facility]  (oncoming nurse) by Jennifer Pimentel, RN  (offgoing nurse). Report included the following information SBAR, Kardex, ED Summary, OR Summary, Procedure Summary, MAR, Recent Results, Med Rec Status and Cardiac Rhythm Afib .

## 2017-04-08 LAB
ANION GAP BLD CALC-SCNC: 8 MMOL/L (ref 3–18)
BASOPHILS # BLD AUTO: 0 K/UL (ref 0–0.06)
BASOPHILS # BLD: 0 % (ref 0–3)
BUN SERPL-MCNC: 31 MG/DL (ref 7–18)
BUN/CREAT SERPL: 16 (ref 12–20)
CALCIUM SERPL-MCNC: 8.1 MG/DL (ref 8.5–10.1)
CHLORIDE SERPL-SCNC: 92 MMOL/L (ref 100–108)
CO2 SERPL-SCNC: 31 MMOL/L (ref 21–32)
CREAT SERPL-MCNC: 1.9 MG/DL (ref 0.6–1.3)
DIFFERENTIAL METHOD BLD: ABNORMAL
EOSINOPHIL # BLD: 0 K/UL (ref 0–0.4)
EOSINOPHIL NFR BLD: 0 % (ref 0–5)
ERYTHROCYTE [DISTWIDTH] IN BLOOD BY AUTOMATED COUNT: 14.3 % (ref 11.6–14.5)
GLUCOSE SERPL-MCNC: 117 MG/DL (ref 74–99)
HCT VFR BLD AUTO: 23.7 % (ref 36–48)
HGB BLD-MCNC: 8.4 G/DL (ref 13–16)
LYMPHOCYTES # BLD AUTO: 10 % (ref 20–51)
LYMPHOCYTES # BLD: 1.4 K/UL (ref 0.8–3.5)
MAGNESIUM SERPL-MCNC: 1.8 MG/DL (ref 1.6–2.6)
MCH RBC QN AUTO: 29.9 PG (ref 24–34)
MCHC RBC AUTO-ENTMCNC: 35.4 G/DL (ref 31–37)
MCV RBC AUTO: 84.3 FL (ref 74–97)
MONOCYTES # BLD: 1.5 K/UL (ref 0–1)
MONOCYTES NFR BLD AUTO: 11 % (ref 2–9)
NEUTS SEG # BLD: 10.7 K/UL (ref 1.8–8)
NEUTS SEG NFR BLD AUTO: 79 % (ref 42–75)
PLATELET # BLD AUTO: 310 K/UL (ref 135–420)
PLATELET COMMENTS,PCOM: ABNORMAL
PMV BLD AUTO: 9 FL (ref 9.2–11.8)
POTASSIUM SERPL-SCNC: 3.5 MMOL/L (ref 3.5–5.5)
RBC # BLD AUTO: 2.81 M/UL (ref 4.7–5.5)
RBC MORPH BLD: ABNORMAL
RBC MORPH BLD: ABNORMAL
SODIUM SERPL-SCNC: 131 MMOL/L (ref 136–145)
WBC # BLD AUTO: 13.6 K/UL (ref 4.6–13.2)

## 2017-04-08 PROCEDURE — 74011250636 HC RX REV CODE- 250/636: Performed by: INTERNAL MEDICINE

## 2017-04-08 PROCEDURE — 74011000250 HC RX REV CODE- 250: Performed by: INTERNAL MEDICINE

## 2017-04-08 PROCEDURE — 74011000258 HC RX REV CODE- 258: Performed by: INTERNAL MEDICINE

## 2017-04-08 PROCEDURE — 74011250637 HC RX REV CODE- 250/637: Performed by: SURGERY

## 2017-04-08 PROCEDURE — 65620000000 HC RM CCU GENERAL

## 2017-04-08 PROCEDURE — 74011250637 HC RX REV CODE- 250/637: Performed by: INTERNAL MEDICINE

## 2017-04-08 PROCEDURE — 83735 ASSAY OF MAGNESIUM: CPT | Performed by: SURGERY

## 2017-04-08 PROCEDURE — 74011250636 HC RX REV CODE- 250/636: Performed by: SURGERY

## 2017-04-08 PROCEDURE — 85025 COMPLETE CBC W/AUTO DIFF WBC: CPT | Performed by: SURGERY

## 2017-04-08 PROCEDURE — 80048 BASIC METABOLIC PNL TOTAL CA: CPT | Performed by: SURGERY

## 2017-04-08 PROCEDURE — 36415 COLL VENOUS BLD VENIPUNCTURE: CPT | Performed by: SURGERY

## 2017-04-08 RX ORDER — MAGNESIUM SULFATE HEPTAHYDRATE 40 MG/ML
2 INJECTION, SOLUTION INTRAVENOUS ONCE
Status: COMPLETED | OUTPATIENT
Start: 2017-04-08 | End: 2017-04-08

## 2017-04-08 RX ORDER — METOPROLOL TARTRATE 25 MG/1
25 TABLET, FILM COATED ORAL
Status: COMPLETED | OUTPATIENT
Start: 2017-04-08 | End: 2017-04-08

## 2017-04-08 RX ORDER — DILTIAZEM HYDROCHLORIDE 5 MG/ML
10 INJECTION INTRAVENOUS
Status: COMPLETED | OUTPATIENT
Start: 2017-04-08 | End: 2017-04-08

## 2017-04-08 RX ORDER — POTASSIUM CHLORIDE 20 MEQ/1
20 TABLET, EXTENDED RELEASE ORAL
Status: COMPLETED | OUTPATIENT
Start: 2017-04-08 | End: 2017-04-08

## 2017-04-08 RX ORDER — METOPROLOL TARTRATE 50 MG/1
50 TABLET ORAL 2 TIMES DAILY
Status: DISCONTINUED | OUTPATIENT
Start: 2017-04-08 | End: 2017-04-11

## 2017-04-08 RX ADMIN — ATORVASTATIN CALCIUM 40 MG: 40 TABLET, FILM COATED ORAL at 08:01

## 2017-04-08 RX ADMIN — DOCUSATE SODIUM 100 MG: 100 CAPSULE, LIQUID FILLED ORAL at 18:29

## 2017-04-08 RX ADMIN — METOPROLOL TARTRATE 50 MG: 50 TABLET ORAL at 22:24

## 2017-04-08 RX ADMIN — DOCUSATE SODIUM 100 MG: 100 CAPSULE, LIQUID FILLED ORAL at 08:01

## 2017-04-08 RX ADMIN — Medication 1000 MCG: at 08:01

## 2017-04-08 RX ADMIN — DILTIAZEM HYDROCHLORIDE 10 MG: 5 INJECTION INTRAVENOUS at 12:31

## 2017-04-08 RX ADMIN — SODIUM CHLORIDE 5 MG/HR: 900 INJECTION, SOLUTION INTRAVENOUS at 12:30

## 2017-04-08 RX ADMIN — POTASSIUM CHLORIDE 20 MEQ: 1500 TABLET, EXTENDED RELEASE ORAL at 09:58

## 2017-04-08 RX ADMIN — Medication 10 ML: at 07:31

## 2017-04-08 RX ADMIN — OXYCODONE HYDROCHLORIDE AND ACETAMINOPHEN 1 TABLET: 5; 325 TABLET ORAL at 22:24

## 2017-04-08 RX ADMIN — ASPIRIN 81 MG CHEWABLE TABLET 81 MG: 81 TABLET CHEWABLE at 08:01

## 2017-04-08 RX ADMIN — SODIUM CHLORIDE 10 MG/HR: 900 INJECTION, SOLUTION INTRAVENOUS at 00:00

## 2017-04-08 RX ADMIN — DEXTROSE MONOHYDRATE: 5 INJECTION, SOLUTION INTRAVENOUS at 05:06

## 2017-04-08 RX ADMIN — METOPROLOL TARTRATE 25 MG: 25 TABLET ORAL at 07:30

## 2017-04-08 RX ADMIN — DIGOXIN 0.12 MG: 0.12 TABLET ORAL at 00:37

## 2017-04-08 RX ADMIN — HEPARIN SODIUM 5000 UNITS: 5000 INJECTION, SOLUTION INTRAVENOUS; SUBCUTANEOUS at 07:30

## 2017-04-08 RX ADMIN — OXYCODONE HYDROCHLORIDE AND ACETAMINOPHEN 1 TABLET: 5; 325 TABLET ORAL at 07:36

## 2017-04-08 RX ADMIN — HEPARIN SODIUM 5000 UNITS: 5000 INJECTION, SOLUTION INTRAVENOUS; SUBCUTANEOUS at 15:32

## 2017-04-08 RX ADMIN — METOPROLOL TARTRATE 25 MG: 25 TABLET ORAL at 12:31

## 2017-04-08 RX ADMIN — OXYCODONE HYDROCHLORIDE AND ACETAMINOPHEN 1 TABLET: 5; 325 TABLET ORAL at 15:32

## 2017-04-08 RX ADMIN — Medication 10 ML: at 15:33

## 2017-04-08 RX ADMIN — HEPARIN SODIUM 5000 UNITS: 5000 INJECTION, SOLUTION INTRAVENOUS; SUBCUTANEOUS at 22:24

## 2017-04-08 RX ADMIN — MAGNESIUM SULFATE HEPTAHYDRATE 2 G: 40 INJECTION, SOLUTION INTRAVENOUS at 09:59

## 2017-04-08 RX ADMIN — CLOPIDOGREL 75 MG: 75 TABLET, FILM COATED ORAL at 08:01

## 2017-04-08 RX ADMIN — OXYCODONE HYDROCHLORIDE AND ACETAMINOPHEN 1 TABLET: 5; 325 TABLET ORAL at 00:09

## 2017-04-08 NOTE — PROGRESS NOTES
Wounds are healing. He is quite tired this morning. Overall no rest pain. Right leg not as good as left as far as blood flow but markedly improved. Can move towards removing from ICU likely tomorrow. Once medically stable can be discharged.

## 2017-04-08 NOTE — ROUTINE PROCESS
{[facility]I BEDSIDE_VERBAL_RECORDED_WRITTEN:[id number]::\"Bedside\"shift change report given to Aldair Ridley RN (oncoming nurse) by Angie Leon RN (offgoing nurse). Report included the following information Kardex, morning labs, vital signs and gtt cont. And d/c'd see MAR. Pt slept well and had relief of pain after some distress and needs met. He was noted to have decrease swelling of extremities after po intake of protein and elevation of extremities on pillows. B/P noted slightly low after meds given see MAR.

## 2017-04-08 NOTE — ANESTHESIA POSTPROCEDURE EVALUATION
Post-Anesthesia Evaluation & Assessment    Visit Vitals    /65    Pulse (!) 105    Temp 36.9 °C (98.5 °F)    Resp 24    Ht 5' 10\" (1.778 m)    Wt 81.6 kg (179 lb 14.4 oz)    SpO2 96%    BMI 25.81 kg/m2       Nausea/Vomiting: no nausea    Post-operative hydration adequate.     Pain score (VAS): 0    Mental status & Level of consciousness: alert and oriented x 3    Neurological status: moves all extremities, sensation grossly intact    Pulmonary status: airway patent, no supplemental oxygen required    Complications related to anesthesia: none    Additional comments:

## 2017-04-08 NOTE — PROGRESS NOTES
RENAL DAILY PROGRESS NOTE      IMPRESSION:   Acute kidney injury, multiple risk factors including  REMI, ? Atherosclerotic emboli, --   Creatinine continue to improve , good urine output  Hyponatremia, improving   Metabolic acidosis corrected   Elevated CK-- improving  Hypomagnesemia   Hypokalemia, mild  Hypocalcemia, with normal phos level, Low ionized calcium   PLAN:    DC bicarbonate drip for today . Reordered mg , K supplement  Ordered lab for tomorrow, including CK. Monitor calcium as recovering from rhabdo. Adjust all meds per current renal function. Subjective:     59y M with MAYRA   Complaint:   Overnight events noted  Feels better today   no nausea, vomiting, chest pain, short of breath, cough, seizure.      Current Facility-Administered Medications   Medication Dose Route Frequency    magnesium sulfate 2 g/50 ml IVPB (premix or compounded)  2 g IntraVENous ONCE    digoxin (LANOXIN) tablet 0.125 mg  0.125 mg Oral Q MON, WED & FRI    PHENYLephrine 30 mg in 0.9% sodium chloride 250 mL (ANISH-SYNEPHRINE) infusion   mcg/min IntraVENous TITRATE    sodium chloride (NS) flush 5-10 mL  5-10 mL IntraVENous PRN    glucose chewable tablet 16 g  4 Tab Oral PRN    glucagon (GLUCAGEN) injection 1 mg  1 mg IntraMUSCular PRN    dextrose (D50W) injection syrg 12.5-25 g  25-50 mL IntraVENous PRN    fentaNYL citrate (PF) injection 50 mcg  50 mcg IntraVENous Q10MIN PRN    HYDROmorphone (PF) (DILAUDID) injection 0.5 mg  0.5 mg IntraVENous PRN    aspirin chewable tablet 81 mg  81 mg Oral DAILY    atorvastatin (LIPITOR) tablet 40 mg  40 mg Oral DAILY    clopidogrel (PLAVIX) tablet 75 mg  75 mg Oral DAILY    cyanocobalamin tablet 1,000 mcg  1,000 mcg Oral DAILY    sodium chloride (NS) flush 5-10 mL  5-10 mL IntraVENous Q8H    sodium chloride (NS) flush 5-10 mL  5-10 mL IntraVENous PRN    acetaminophen (TYLENOL) tablet 650 mg  650 mg Oral Q4H PRN    oxyCODONE-acetaminophen (PERCOCET) 5-325 mg per tablet 1 Tab  1 Tab Oral Q4H PRN    morphine injection 2 mg  2 mg IntraVENous Q2H PRN    naloxone (NARCAN) injection 0.4 mg  0.4 mg IntraVENous PRN    ondansetron (ZOFRAN) injection 4 mg  4 mg IntraVENous Q4H PRN    diphenhydrAMINE (BENADRYL) injection 12.5 mg  12.5 mg IntraVENous Q4H PRN    docusate sodium (COLACE) capsule 100 mg  100 mg Oral BID    heparin (porcine) injection 5,000 Units  5,000 Units SubCUTAneous Q8H    metoprolol tartrate (LOPRESSOR) tablet 25 mg  25 mg Oral Q8H       Review of Symptoms: comprehensive ROS negative except above. Objective:     Patient Vitals for the past 24 hrs:   Temp Pulse Resp BP SpO2   04/08/17 0900 - 70 16 102/58 94 %   04/08/17 0800 - 78 17 95/54 95 %   04/08/17 0700 98 °F (36.7 °C) 78 24 115/62 99 %   04/08/17 0600 - 78 22 111/49 99 %   04/08/17 0500 - 78 17 91/65 97 %   04/08/17 0400 - 76 24 120/69 95 %   04/08/17 0300 - 74 15 99/58 95 %   04/08/17 0245 - 76 19 100/64 94 %   04/08/17 0151 - 80 22 103/63 96 %   04/08/17 0131 - 80 19 104/65 98 %   04/08/17 0044 - 76 28 (!) 87/59 97 %   04/08/17 0037 - 73 - 91/62 -   04/08/17 0029 - 68 20 91/62 100 %   04/08/17 0014 - 76 27 90/61 99 %   04/07/17 2358 - 78 26 92/58 98 %   04/07/17 2329 - 70 21 (!) 86/60 98 %   04/07/17 2313 - 94 25 111/60 95 %   04/07/17 2258 - 78 19 93/64 -   04/07/17 2250 - 84 29 (!) 86/52 -   04/07/17 2223 - (!) 102 20 122/85 -   04/07/17 2127 - 91 - 105/73 -   04/07/17 2121 - 100 24 105/73 -   04/07/17 2112 98.8 °F (37.1 °C) (!) 102 21 - 95 %   04/07/17 2027 - (!) 105 24 119/65 96 %   04/07/17 1800 - 94 23 134/68 100 %   04/07/17 1700 - 92 17 119/48 99 %   04/07/17 1600 98.5 °F (36.9 °C) 88 24 125/72 100 %   04/07/17 1549 - (!) 106 (!) 38 124/67 95 %   04/07/17 1301 - 94 28 127/67 98 %   04/07/17 1200 98.2 °F (36.8 °C) 82 25 125/69 96 %   04/07/17 1100 - 76 18 116/74 96 %        Weight change:      04/06 1901 - 04/08 0700  In: 4365.3 [P.O.:1680;  I.V.:9855.3]  Out: 3162 [DLJNZ:1775]    Intake/Output Summary (Last 24 hours) at 04/08/17 1042  Last data filed at 04/08/17 0900   Gross per 24 hour   Intake             3578 ml   Output             1670 ml   Net             1908 ml     Physical Exam:   General: comfortable, no acute distress   HEENT sclera anicteric, supple neck, no thyromegaly  CVS: S1S2 heard,  no rub  RS: + air entry b/l,   Abd: Soft, Non tender,   Neuro: non focal, awake, alert ,   Extrm:no  edema, no cyanosis, clubbing   Skin: dry          Data Review:     LABS:   Hematology:   Recent Labs      04/08/17   0835  04/07/17   0610  04/06/17   0530   WBC  13.6*  15.9*  17.1*   HGB  8.4*  8.8*  9.0*   HCT  23.7*  24.3*  24.6*     Chemistry:   Recent Labs      04/08/17   0835  04/07/17   0610  04/06/17   0530  04/05/17   2126   BUN  31*  41*  44*   --    CREA  1.90*  2.75*  3.74*   --    CA  8.1*  7.9*  7.5*  7.6*   K  3.5  3.4*  4.7   --    NA  131*  129*  124*   --    CL  92*  91*  91*   --    CO2  31  27  19*   --    PHOS   --   2.7  3.1   --    GLU  117*  102*  87   --               Procedures/imaging: see electronic medical records for all procedures, Xrays and details which were not copied into this note but were reviewed prior to creation of Plan          Assessment & Plan:     As above         Michelle Newsome MD  4/8/2017  12:08 PM

## 2017-04-08 NOTE — PROGRESS NOTES
[facility]  -  Progress Note      Patient: Kellie Gonzalez MRN: [id number]  SSN: [id number]    YOB: 1960  Age: 59 y.o. Sex: male      Admit Date: 4/4/2017    Assessment:     Hospital Problems  Date Reviewed: 4/4/2017          Codes Class Noted POA    Aortoiliac occlusive disease ([facility] ICD-10-CM: I74.09  ICD-9-CM: 444.09  1/25/2017 Unknown        Atherosclerosis of native artery of both lower extremities with intermittent claudication ([facility] ICD-10-CM: V04.002  ICD-9-CM: 440.21  1/25/2017 Unknown        PAD (peripheral artery disease) ([facility] ICD-10-CM: I73.9  ICD-9-CM: 443.9  1/25/2017 Unknown          - A.fib w/RVR in setting of known persistent atrial fibrillation. Heart rate was elevated as outpatient as well. Asymptomatic.   - PAD, s/p aortoiliac occlusive disease s/p R axillary fem bypass 4/4/17  - Acute Kidney Injury; Cr 4  - Cardiomyopathy, ischemic. ECHO 2/24/17 EF 35-40% mod. idffuse hypokinesis  - s/p cath 3/15/17 with drug eluding stents to LCx and RCA  - Asymptomatic carotid artery disease  - Essential Hypertension  - Hoarse voice; over the past month  - Weight loss; 20lbs over the past month. - Decreasing H&H with Hct going from 27.1 on 4/4/2017 to 23.7 today 4/8/2017. Plan:     1. DC Cardizem drip. 2. If heart rate increases greater than 100 would increase Lopressor to 50 mg twice daily from 25 mg 3 times daily. 3. Will check stool guaiac's in view of decreasing H&H. Subjective:     No new complaints. Lying flat and comfortable with heart rate in atrial fibrillation in the 60's and 70's on Cardizem drip 5 mg per hour, Lopressor 25 mg twice daily, and Digoxin 0.125 mg 3 times.      Objective:      Patient Vitals for the past 8 hrs:   Temp Pulse Resp BP SpO2   04/08/17 0700 98 °F (36.7 °C) 78 24 115/62 99 %   04/08/17 0600 - 78 22 111/49 99 %   04/08/17 0500 - 78 17 91/65 97 %   04/08/17 0400 - 76 24 120/69 95 %   04/08/17 0300 - 74 15 99/58 95 %   04/08/17 0245 - 76 19 100/64 94 %   04/08/17 0151 - 80 22 103/63 96 %   04/08/17 0131 - 80 19 104/65 98 %         Patient Vitals for the past 96 hrs:   Weight   04/07/17 0615 81.6 kg (179 lb 14.4 oz)         Intake/Output Summary (Last 24 hours) at 04/08/17 0904  Last data filed at 04/08/17 0745   Gross per 24 hour   Intake             3448 ml   Output             1870 ml   Net             1578 ml       Physical Exam:  General:  alert, cooperative, no distress, appears stated age  Neck:  no JVD  Lungs:  clear to auscultation bilaterally  Heart:  regular rate and rhythm, S1, S2 normal, no murmur, click, rub or gallop  Abdomen:  abdomen is soft without significant tenderness, masses, organomegaly or guarding  Extremities:  extremities normal, atraumatic, no cyanosis or edema    Data Review:     Labs: Results:       Chemistry Recent Labs      04/07/17   0610  04/06/17   0530  04/05/17   2126   GLU  102*  87   --    NA  129*  124*   --    K  3.4*  4.7   --    CL  91*  91*   --    CO2  27  19*   --    BUN  41*  44*   --    CREA  2.75*  3.74*   --    CA  7.9*  7.5*  7.6*   MG  1.8  1.4*   --    PHOS  2.7  3.1   --    AGAP  11  14   --    BUCR  15  12   --       CBC w/Diff Recent Labs      04/07/17   0610  04/06/17   0530   WBC  15.9*  17.1*   RBC  2.95*  3.01*   HGB  8.8*  9.0*   HCT  24.3*  24.6*   PLT  339  375   GRANS  85*  88*   LYMPH  7*  7*   EOS  0  0      Cardiac Enzymes No results found for: CPK, CKMMB, CKMB, RCK3, CKMBT, CKNDX, CKND1, LEO, TROPT, TROIQ, DEWAYNE, TROPT, TNIPOC, BNP, BNPP   Coagulation No results for input(s): PTP, INR, APTT in the last 72 hours.     No lab exists for component: INREXT    Lipid Panel Lab Results   Component Value Date/Time    Cholesterol, total 157 12/29/2015 07:18 AM    HDL Cholesterol 28 12/29/2015 07:18 AM    LDL, calculated 112.4 12/29/2015 07:18 AM    VLDL, calculated 16.6 12/29/2015 07:18 AM    Triglyceride 83 12/29/2015 07:18 AM    CHOL/HDL Ratio 5.6 12/29/2015 07:18 AM      BNP No results found for: BNP, BNPP, XBNPT   Liver Enzymes No results for input(s): TP, ALB, TBIL, AP, SGOT, GPT in the last 72 hours.     No lab exists for component: DBIL   Digoxin    Thyroid Studies Lab Results   Component Value Date/Time    TSH 4.77 04/06/2017 05:30 AM          Carlee Mccoy DO   April 8, 2017

## 2017-04-08 NOTE — OP NOTES
[address]    Name:  German Agudelo  MR#:  [id number]  :  1960  Account #:  [de-identified]  Date of Adm:  2017  Date of Surgery:  2017      PREOPERATIVE DIAGNOSIS: Right lower extremity peripheral  arterial disease. POSTOPERATIVE DIAGNOSIS: Right lower extremity peripheral  arterial disease. PROCEDURES PERFORMED  1. Cutdown of femoral-femoral bypass, more on the left groin side. 2. Right lower extremity angiography with first-order catheterization. ESTIMATED BLOOD LOSS: 50 mL. SPECIMENS REMOVED: None. ANESTHESIA:  general    ATTENDING: Rylee Adler MD    CULTURES: None. SPECIMENS REMOVED: None. DRAINS: None. INDICATIONS FOR PROCEDURE: The patient is a 49-year-old  gentleman who just had a recent axillary bifemoral bypass. The patient  did have an endarterectomy of the common femoral artery. It was felt  that there was some backbleeding from the superficial femoral artery,  but the patient did not have as good of a result on the right leg as  thought. The patient continues to have some feelings of numbness and  tingling of the right foot, but seems to be slightly improving as time  goes on, but wanted to make sure that there was no problem from the  endarterectomy site. So, the patient was given the risks and benefits of  the procedure including but not limited to bleeding, infection, damage  to adjacent structures, MI, stroke, and death, as well as loss of the  lower extremity. The patient was understanding of all the risks and  underwent the procedure. OPERATIVE FINDINGS: The femoral-femoral bypass is open without  any evidence of stenosis. The right axillary to right femoral artery  bypass is open distally without any evidence of stenosis. The common  femoral artery is patent without stenosis. There is actually flow going  up into the right external iliac artery somewhat, which is patent without  stenosis.  The profunda femoris artery is patent without stenosis, and  the superficial femoral artery is patent for about 2 cm and then a  completely occludes and does not reconstitute until the midportion of  the superficial femoral artery, and then appears to be diseased around  30% to 40%. The above-knee popliteal artery does appear to be patent  without stenosis. We did not look all the way down. DESCRIPTION OF PROCEDURE: The patient was correctly identified  in the ICU and taken to the operating room in stable condition. The  patient had a preincision timeout prior to the incision. The patient was  prepped and draped in normal sterile fashion according to CDC  guidelines for aseptic technique. We then were able to cut down onto  the fem-fem bypass after numbing this area up with 1% lidocaine. We  looped the fem-fem and removed 2 frames. We then took a single wall  entry needle to gain access into the fem-fem and placed a wire in and  a 6-Tamazight short sheath. We then took a Rivas catheter and placed  this in the right common femoral artery and performed CO2  angiography showing the above findings. We then attempted to get  down the SFA. We did minorly heparinize the patient with 3000 units of  heparin. We attempted to cross what appeared to be chronic total  occlusion of the SFA, but we were only able to get into a subintimal  plane, could not get into the normal artery down low. So, the decision  was then made to conclude the case as this would be best done in the  cath lab once he is more healed. We then removed all of our wires and catheters, removed sheath,  closed the sheath insertion site with multiple 5-0 CV5 sutures. We then  were able to copiously irrigate the wound, closed in a multilayer 3-0  Vicryl fashion and a 4-0 Vicryl subcuticular fashion and Dermabond for  dressing. The patient tolerated the procedure well without any issues.         MD Marco Mazariegos / [email or web address].Andrea  D:  04/07/2017   22:07  T:  04/07/2017 23:58  Job #:  [id number]

## 2017-04-09 LAB
ANION GAP BLD CALC-SCNC: 10 MMOL/L (ref 3–18)
BACTERIA SPEC CULT: NORMAL
BASOPHILS # BLD AUTO: 0 K/UL (ref 0–0.1)
BASOPHILS # BLD: 0 % (ref 0–2)
BUN SERPL-MCNC: 24 MG/DL (ref 7–18)
BUN/CREAT SERPL: 15 (ref 12–20)
CALCIUM SERPL-MCNC: 8.7 MG/DL (ref 8.5–10.1)
CHLORIDE SERPL-SCNC: 93 MMOL/L (ref 100–108)
CO2 SERPL-SCNC: 29 MMOL/L (ref 21–32)
CREAT SERPL-MCNC: 1.56 MG/DL (ref 0.6–1.3)
DIFFERENTIAL METHOD BLD: ABNORMAL
DIGOXIN SERPL-MCNC: 0.7 NG/ML (ref 0.9–2)
EOSINOPHIL # BLD: 0.1 K/UL (ref 0–0.4)
EOSINOPHIL NFR BLD: 0 % (ref 0–5)
ERYTHROCYTE [DISTWIDTH] IN BLOOD BY AUTOMATED COUNT: 14.7 % (ref 11.6–14.5)
GLUCOSE SERPL-MCNC: 94 MG/DL (ref 74–99)
HCT VFR BLD AUTO: 25.3 % (ref 36–48)
HGB BLD-MCNC: 8.9 G/DL (ref 13–16)
LYMPHOCYTES # BLD AUTO: 9 % (ref 21–52)
LYMPHOCYTES # BLD: 1.4 K/UL (ref 0.9–3.6)
MAGNESIUM SERPL-MCNC: 1.6 MG/DL (ref 1.6–2.6)
MCH RBC QN AUTO: 30.1 PG (ref 24–34)
MCHC RBC AUTO-ENTMCNC: 35.2 G/DL (ref 31–37)
MCV RBC AUTO: 85.5 FL (ref 74–97)
MONOCYTES # BLD: 1.6 K/UL (ref 0.05–1.2)
MONOCYTES NFR BLD AUTO: 10 % (ref 3–10)
NEUTS SEG # BLD: 12.6 K/UL (ref 1.8–8)
NEUTS SEG NFR BLD AUTO: 81 % (ref 40–73)
PLATELET # BLD AUTO: 403 K/UL (ref 135–420)
PMV BLD AUTO: 9.2 FL (ref 9.2–11.8)
POTASSIUM SERPL-SCNC: 3.6 MMOL/L (ref 3.5–5.5)
RBC # BLD AUTO: 2.96 M/UL (ref 4.7–5.5)
SERVICE CMNT-IMP: NORMAL
SODIUM SERPL-SCNC: 132 MMOL/L (ref 136–145)
WBC # BLD AUTO: 15.6 K/UL (ref 4.6–13.2)

## 2017-04-09 PROCEDURE — 65620000000 HC RM CCU GENERAL

## 2017-04-09 PROCEDURE — 74011250637 HC RX REV CODE- 250/637: Performed by: INTERNAL MEDICINE

## 2017-04-09 PROCEDURE — 36415 COLL VENOUS BLD VENIPUNCTURE: CPT | Performed by: SURGERY

## 2017-04-09 PROCEDURE — 74011250636 HC RX REV CODE- 250/636: Performed by: ANESTHESIOLOGY

## 2017-04-09 PROCEDURE — 74011000250 HC RX REV CODE- 250: Performed by: INTERNAL MEDICINE

## 2017-04-09 PROCEDURE — 74011000258 HC RX REV CODE- 258: Performed by: INTERNAL MEDICINE

## 2017-04-09 PROCEDURE — 74011250636 HC RX REV CODE- 250/636: Performed by: SURGERY

## 2017-04-09 PROCEDURE — 85730 THROMBOPLASTIN TIME PARTIAL: CPT | Performed by: SURGERY

## 2017-04-09 PROCEDURE — 85025 COMPLETE CBC W/AUTO DIFF WBC: CPT | Performed by: INTERNAL MEDICINE

## 2017-04-09 PROCEDURE — 80162 ASSAY OF DIGOXIN TOTAL: CPT | Performed by: INTERNAL MEDICINE

## 2017-04-09 PROCEDURE — 83735 ASSAY OF MAGNESIUM: CPT | Performed by: INTERNAL MEDICINE

## 2017-04-09 PROCEDURE — 74011250637 HC RX REV CODE- 250/637: Performed by: SURGERY

## 2017-04-09 PROCEDURE — 80048 BASIC METABOLIC PNL TOTAL CA: CPT | Performed by: INTERNAL MEDICINE

## 2017-04-09 RX ORDER — HEPARIN SODIUM 10000 [USP'U]/100ML
18-36 INJECTION, SOLUTION INTRAVENOUS
Status: DISCONTINUED | OUTPATIENT
Start: 2017-04-09 | End: 2017-04-13

## 2017-04-09 RX ORDER — OXYCODONE AND ACETAMINOPHEN 5; 325 MG/1; MG/1
2 TABLET ORAL
Status: DISCONTINUED | OUTPATIENT
Start: 2017-04-09 | End: 2017-04-17

## 2017-04-09 RX ORDER — MAGNESIUM SULFATE HEPTAHYDRATE 40 MG/ML
INJECTION, SOLUTION INTRAVENOUS
Status: DISPENSED
Start: 2017-04-09 | End: 2017-04-09

## 2017-04-09 RX ORDER — DIGOXIN 125 MCG
0.12 TABLET ORAL DAILY
Status: DISCONTINUED | OUTPATIENT
Start: 2017-04-09 | End: 2017-04-12

## 2017-04-09 RX ADMIN — HYDROMORPHONE HYDROCHLORIDE 0.5 MG: 2 INJECTION, SOLUTION INTRAMUSCULAR; INTRAVENOUS; SUBCUTANEOUS at 22:40

## 2017-04-09 RX ADMIN — Medication 10 ML: at 10:16

## 2017-04-09 RX ADMIN — DOCUSATE SODIUM 100 MG: 100 CAPSULE, LIQUID FILLED ORAL at 09:07

## 2017-04-09 RX ADMIN — Medication 1000 MCG: at 09:07

## 2017-04-09 RX ADMIN — ASPIRIN 81 MG CHEWABLE TABLET 81 MG: 81 TABLET CHEWABLE at 09:07

## 2017-04-09 RX ADMIN — DIGOXIN 0.12 MG: 0.12 TABLET ORAL at 19:38

## 2017-04-09 RX ADMIN — OXYCODONE HYDROCHLORIDE AND ACETAMINOPHEN 1 TABLET: 5; 325 TABLET ORAL at 02:35

## 2017-04-09 RX ADMIN — Medication 10 ML: at 23:20

## 2017-04-09 RX ADMIN — Medication 2 MG: at 09:53

## 2017-04-09 RX ADMIN — DOCUSATE SODIUM 100 MG: 100 CAPSULE, LIQUID FILLED ORAL at 19:38

## 2017-04-09 RX ADMIN — OXYCODONE HYDROCHLORIDE AND ACETAMINOPHEN 1 TABLET: 5; 325 TABLET ORAL at 09:40

## 2017-04-09 RX ADMIN — HYDROMORPHONE HYDROCHLORIDE 0.5 MG: 2 INJECTION, SOLUTION INTRAMUSCULAR; INTRAVENOUS; SUBCUTANEOUS at 21:41

## 2017-04-09 RX ADMIN — SODIUM CHLORIDE 5 MG/HR: 900 INJECTION, SOLUTION INTRAVENOUS at 19:38

## 2017-04-09 RX ADMIN — CLOPIDOGREL 75 MG: 75 TABLET, FILM COATED ORAL at 09:07

## 2017-04-09 RX ADMIN — METOPROLOL TARTRATE 50 MG: 50 TABLET ORAL at 09:07

## 2017-04-09 RX ADMIN — ATORVASTATIN CALCIUM 40 MG: 40 TABLET, FILM COATED ORAL at 09:07

## 2017-04-09 RX ADMIN — HEPARIN SODIUM AND DEXTROSE 18 UNITS/KG/HR: 10000; 5 INJECTION INTRAVENOUS at 17:00

## 2017-04-09 RX ADMIN — DIPHENHYDRAMINE HYDROCHLORIDE 12.5 MG: 50 INJECTION, SOLUTION INTRAMUSCULAR; INTRAVENOUS at 23:01

## 2017-04-09 RX ADMIN — HYDROMORPHONE HYDROCHLORIDE 0.5 MG: 2 INJECTION, SOLUTION INTRAMUSCULAR; INTRAVENOUS; SUBCUTANEOUS at 10:15

## 2017-04-09 RX ADMIN — OXYCODONE HYDROCHLORIDE AND ACETAMINOPHEN 1 TABLET: 5; 325 TABLET ORAL at 06:15

## 2017-04-09 RX ADMIN — HEPARIN SODIUM 5000 UNITS: 5000 INJECTION, SOLUTION INTRAVENOUS; SUBCUTANEOUS at 06:28

## 2017-04-09 RX ADMIN — METOPROLOL TARTRATE 50 MG: 50 TABLET ORAL at 23:01

## 2017-04-09 RX ADMIN — Medication 10 ML: at 06:29

## 2017-04-09 NOTE — PROGRESS NOTES
[facility]  -  Progress Note      Patient: Mariella Chan MRN: [id number]  SSN: [id number]    YOB: 1960  Age: 59 y.o. Sex: male      Admit Date: 4/4/2017     Assessment:     Hospital Problems  Date Reviewed: 4/4/2017          Codes Class Noted POA    Aortoiliac occlusive disease ([facility] ICD-10-CM: I74.09  ICD-9-CM: 444.09  1/25/2017 Unknown        Atherosclerosis of native artery of both lower extremities with intermittent claudication ([facility] ICD-10-CM: Y31.361  ICD-9-CM: 440.21  1/25/2017 Unknown        PAD (peripheral artery disease) ([facility] ICD-10-CM: I73.9  ICD-9-CM: 443.9  1/25/2017 Unknown          - A.fib w/RVR in setting of known persistent atrial fibrillation. Heart rate was elevated as outpatient as well. Asymptomatic.   - PAD, s/p aortoiliac occlusive disease s/p R axillary fem bypass 4/4/17  - Acute Kidney Injury; Cr 4 on 4/5/2017 improved to 1.90 yesterday  - Cardiomyopathy, ischemic. ECHO 2/24/17 EF 35-40% mod. idffuse hypokinesis  - s/p cath 3/15/17 with drug eluding stents to LCx and RCA  - Asymptomatic carotid artery disease  - Essential Hypertension  - Hoarse voice; over the past month  - Weight loss; 20lbs over the past month  - Anemia with H&H continuing to slowly drop down to 23.7 yesterday    Plan:     1. Will get CBC and BMP this AM and recheck digoxin level from morning blood since with improved renal function will probably need more frequent digoxin. 2. If Hct below 22 would transfuse 1 unit of packed cells. 3. Will make further recommendations on medical management of heart rate in atrial fibrillation pending review of above. Subjective:     No new complaints, but heart rate in atrial fibrillation still elevated this AM at 120-140 currently on Digoxin 0.125 mg 3 times per week, Lopressor 50 mg twice daily, and Cardizem drip at 5 mg per hour.  Heart rate may be driven by decreasing H&H down to  8.4 & 23.7 yesterday    Objective:      Patient Vitals for the past 8 hrs:   Pulse Resp BP   04/09/17 0300 (!) 106 19 107/80   04/09/17 0200 (!) 104 20 126/81   04/09/17 0100 96 17 103/75         Patient Vitals for the past 96 hrs:   Weight   04/07/17 0615 81.6 kg (179 lb 14.4 oz)         Intake/Output Summary (Last 24 hours) at 04/09/17 8946  Last data filed at 04/09/17 0200   Gross per 24 hour   Intake           765.25 ml   Output             2100 ml   Net         -1334.75 ml       Physical Exam:  General:  alert, cooperative, no distress, appears stated age  Neck:  no JVD  Lungs:  clear to auscultation bilaterally  Heart:  regular rate and rhythm, S1, S2 normal, no murmur, click, rub or gallop  Abdomen:  abdomen is soft without significant tenderness, masses, organomegaly or guarding  Extremities:  extremities normal, atraumatic, no cyanosis with trace to 1 + edema with marked tenderness to palpation of right calf and black tip of right great toe. Data Review:     Labs: Results:       Chemistry Recent Labs      04/08/17   0835  04/07/17   0610   GLU  117*  102*   NA  131*  129*   K  3.5  3.4*   CL  92*  91*   CO2  31  27   BUN  31*  41*   CREA  1.90*  2.75*   CA  8.1*  7.9*   MG  1.8  1.8   PHOS   --   2.7   AGAP  8  11   BUCR  16  15      CBC w/Diff Recent Labs      04/08/17   0835  04/07/17   0610   WBC  13.6*  15.9*   RBC  2.81*  2.95*   HGB  8.4*  8.8*   HCT  23.7*  24.3*   PLT  310  339   GRANS  79*  85*   LYMPH  10*  7*   EOS  0  0      Cardiac Enzymes No results found for: CPK, CKMMB, CKMB, RCK3, CKMBT, CKNDX, CKND1, LEO, TROPT, TROIQ, DEWAYNE, TROPT, TNIPOC, BNP, BNPP   Coagulation No results for input(s): PTP, INR, APTT in the last 72 hours.     No lab exists for component: INREXT    Lipid Panel Lab Results   Component Value Date/Time    Cholesterol, total 157 12/29/2015 07:18 AM    HDL Cholesterol 28 12/29/2015 07:18 AM    LDL, calculated 112.4 12/29/2015 07:18 AM    VLDL, calculated 16.6 12/29/2015 07:18 AM    Triglyceride 83 12/29/2015 07:18 AM    CHOL/HDL Ratio 5.6 12/29/2015 07:18 AM      BNP No results found for: BNP, BNPP, XBNPT   Liver Enzymes No results for input(s): TP, ALB, TBIL, AP, SGOT, GPT in the last 72 hours.     No lab exists for component: DBIL   Digoxin    Thyroid Studies Lab Results   Component Value Date/Time    TSH 4.77 04/06/2017 05:30 AM          Marino Rojas DO   April 9, 2017

## 2017-04-09 NOTE — PROGRESS NOTES
Pt visiting with family. Appears quite comfortable currently. VSS, heart rate remains irreg. WBCs elevated. Urine cx negative. ? Secondary to ischemic foot. Afebrile. States that he had an episode of severe pain in the right foot this morning but now feeling better. Had CO 2 angio on Friday. Unable to review report at this time. Discussed with pt and family I will discuss plan with Dr Irma Walker in the AM and will discuss further surgical planning accordingly. Pt expresses understanding and agrees. He is understanding that he is high risk for amputation.      Elvia Harris  [phone]

## 2017-04-09 NOTE — ROUTINE PROCESS
Bedside and Verbal shift change report given to River Ray RN (oncoming nurse) by Brooks Starr RN   (offgoing nurse). Report given with SBAR, Kardex, Intake/Output, MAR, Accordion and Recent Results.

## 2017-04-09 NOTE — PROGRESS NOTES
RENAL DAILY PROGRESS NOTE      IMPRESSION:   Acute kidney injury, multiple risk factors including  REMI, ? Atherosclerotic emboli, --   Creatinine continue to improve , good urine output  Hyponatremia, improving   Metabolic acidosis corrected   Elevated CK-- improving  Hypomagnesemia   Hypokalemia, mild  Hypocalcemia, with normal phos level, Low ionized calcium   PLAN:   · Awaiting for lab, expecting that kidney function continue to recover. · If mg < 2, give 2gm iv mg, if K < 3.5 give Potassium oral 20meq  · Dc lopez   Monitor calcium as recovering from rhabdo. Adjust all meds per current renal function. Discussed with nursing staff. Subjective:     59y M with MAYRA   Complaint:   Overnight events noted  no nausea, vomiting, chest pain, short of breath, cough, seizure.      Current Facility-Administered Medications   Medication Dose Route Frequency    oxyCODONE-acetaminophen (PERCOCET) 5-325 mg per tablet 2 Tab  2 Tab Oral Q4H PRN    dilTIAZem (CARDIZEM) 100 mg in 0.9% sodium chloride (MBP/ADV) 100 mL infusion  5 mg/hr IntraVENous CONTINUOUS    metoprolol tartrate (LOPRESSOR) tablet 50 mg  50 mg Oral BID    digoxin (LANOXIN) tablet 0.125 mg  0.125 mg Oral Q MON, WED & FRI    PHENYLephrine 30 mg in 0.9% sodium chloride 250 mL (ANISH-SYNEPHRINE) infusion   mcg/min IntraVENous TITRATE    sodium chloride (NS) flush 5-10 mL  5-10 mL IntraVENous PRN    glucose chewable tablet 16 g  4 Tab Oral PRN    glucagon (GLUCAGEN) injection 1 mg  1 mg IntraMUSCular PRN    dextrose (D50W) injection syrg 12.5-25 g  25-50 mL IntraVENous PRN    fentaNYL citrate (PF) injection 50 mcg  50 mcg IntraVENous Q10MIN PRN    HYDROmorphone (PF) (DILAUDID) injection 0.5 mg  0.5 mg IntraVENous PRN    aspirin chewable tablet 81 mg  81 mg Oral DAILY    atorvastatin (LIPITOR) tablet 40 mg  40 mg Oral DAILY    clopidogrel (PLAVIX) tablet 75 mg  75 mg Oral DAILY    cyanocobalamin tablet 1,000 mcg  1,000 mcg Oral DAILY  sodium chloride (NS) flush 5-10 mL  5-10 mL IntraVENous Q8H    sodium chloride (NS) flush 5-10 mL  5-10 mL IntraVENous PRN    morphine injection 2 mg  2 mg IntraVENous Q2H PRN    naloxone (NARCAN) injection 0.4 mg  0.4 mg IntraVENous PRN    ondansetron (ZOFRAN) injection 4 mg  4 mg IntraVENous Q4H PRN    diphenhydrAMINE (BENADRYL) injection 12.5 mg  12.5 mg IntraVENous Q4H PRN    docusate sodium (COLACE) capsule 100 mg  100 mg Oral BID    heparin (porcine) injection 5,000 Units  5,000 Units SubCUTAneous Q8H       Review of Symptoms: comprehensive ROS negative except above. Objective:     Patient Vitals for the past 24 hrs:   Temp Pulse Resp BP SpO2   04/09/17 0300 - (!) 106 19 107/80 -   04/09/17 0200 - (!) 104 20 126/81 -   04/09/17 0100 - 96 17 103/75 -   04/09/17 0000 - 100 21 112/70 -   04/08/17 2300 - (!) 116 18 121/79 -   04/08/17 2200 - (!) 116 27 129/81 -   04/08/17 2100 - 100 24 131/73 -   04/08/17 2000 - (!) 114 23 118/67 95 %   04/08/17 1900 - (!) 112 21 127/70 98 %   04/08/17 1800 - (!) 110 20 100/61 100 %   04/08/17 1700 - (!) 102 28 110/60 97 %   04/08/17 1600 - (!) 112 19 119/74 100 %   04/08/17 1500 97.7 °F (36.5 °C) 86 18 108/60 97 %   04/08/17 1400 - 72 21 95/66 97 %   04/08/17 1300 - 90 22 109/59 98 %   04/08/17 1200 98 °F (36.7 °C) (!) 114 24 116/61 96 %   04/08/17 1141 97.7 °F (36.5 °C) - - - -   04/08/17 1100 - 82 25 113/79 90 %        Weight change:      04/07 1901 - 04/09 0700  In: 3573.3 [P.O.:2528;  I.V.:1045.3]  Out: 1036 [Urine:3020]    Intake/Output Summary (Last 24 hours) at 04/09/17 0921  Last data filed at 04/09/17 0200   Gross per 24 hour   Intake           695.25 ml   Output             2100 ml   Net         -1404.75 ml     Physical Exam:   General: comfortable, no acute distress   HEENT sclera anicteric, supple neck, no thyromegaly  CVS: S1S2 heard,  no rub  RS: + air entry b/l,   Abd: Soft, Non tender,   Neuro: non focal, awake, alert ,   Extrm:no  edema, no cyanosis, clubbing   Skin: no visible rash           Data Review:     LABS:   Hematology:   Recent Labs      04/08/17   0835  04/07/17   0610   WBC  13.6*  15.9*   HGB  8.4*  8.8*   HCT  23.7*  24.3*     Chemistry:   Recent Labs      04/08/17   0835  04/07/17   0610   BUN  31*  41*   CREA  1.90*  2.75*   CA  8.1*  7.9*   K  3.5  3.4*   NA  131*  129*   CL  92*  91*   CO2  31  27   PHOS   --   2.7   GLU  117*  102*              Procedures/imaging: see electronic medical records for all procedures, Xrays and details which were not copied into this note but were reviewed prior to creation of Plan          Assessment & Plan:     As above         Santos Olsen MD  4/9/2017  12:08 PM

## 2017-04-10 ENCOUNTER — ANESTHESIA (OUTPATIENT)
Dept: CARDIOTHORACIC SURGERY | Age: 57
DRG: 253 | End: 2017-04-10
Payer: COMMERCIAL

## 2017-04-10 ENCOUNTER — ANESTHESIA EVENT (OUTPATIENT)
Dept: CARDIOTHORACIC SURGERY | Age: 57
DRG: 253 | End: 2017-04-10
Payer: COMMERCIAL

## 2017-04-10 LAB
ANION GAP BLD CALC-SCNC: 9 MMOL/L (ref 3–18)
APTT PPP: 138 SEC (ref 23–36.4)
APTT PPP: 138.3 SEC (ref 23–36.4)
APTT PPP: 89.9 SEC (ref 23–36.4)
APTT PPP: >180 SEC (ref 23–36.4)
BASOPHILS # BLD AUTO: 0 K/UL (ref 0–0.1)
BASOPHILS # BLD: 0 % (ref 0–2)
BUN SERPL-MCNC: 22 MG/DL (ref 7–18)
BUN/CREAT SERPL: 15 (ref 12–20)
CALCIUM SERPL-MCNC: 8.6 MG/DL (ref 8.5–10.1)
CHLORIDE SERPL-SCNC: 94 MMOL/L (ref 100–108)
CK SERPL-CCNC: 927 U/L (ref 39–308)
CO2 SERPL-SCNC: 27 MMOL/L (ref 21–32)
CREAT SERPL-MCNC: 1.42 MG/DL (ref 0.6–1.3)
DIFFERENTIAL METHOD BLD: ABNORMAL
EOSINOPHIL # BLD: 0.1 K/UL (ref 0–0.4)
EOSINOPHIL NFR BLD: 1 % (ref 0–5)
ERYTHROCYTE [DISTWIDTH] IN BLOOD BY AUTOMATED COUNT: 15 % (ref 11.6–14.5)
GLUCOSE SERPL-MCNC: 89 MG/DL (ref 74–99)
HCT VFR BLD AUTO: 24 % (ref 36–48)
HGB BLD-MCNC: 8.5 G/DL (ref 13–16)
LYMPHOCYTES # BLD AUTO: 11 % (ref 21–52)
LYMPHOCYTES # BLD: 1.9 K/UL (ref 0.9–3.6)
MAGNESIUM SERPL-MCNC: 1.8 MG/DL (ref 1.6–2.6)
MCH RBC QN AUTO: 30.5 PG (ref 24–34)
MCHC RBC AUTO-ENTMCNC: 35.4 G/DL (ref 31–37)
MCV RBC AUTO: 86 FL (ref 74–97)
MONOCYTES # BLD: 1.8 K/UL (ref 0.05–1.2)
MONOCYTES NFR BLD AUTO: 10 % (ref 3–10)
NEUTS SEG # BLD: 13.7 K/UL (ref 1.8–8)
NEUTS SEG NFR BLD AUTO: 78 % (ref 40–73)
PLATELET # BLD AUTO: 400 K/UL (ref 135–420)
PMV BLD AUTO: 8.8 FL (ref 9.2–11.8)
POTASSIUM SERPL-SCNC: 4 MMOL/L (ref 3.5–5.5)
RBC # BLD AUTO: 2.79 M/UL (ref 4.7–5.5)
SODIUM SERPL-SCNC: 130 MMOL/L (ref 136–145)
WBC # BLD AUTO: 17.5 K/UL (ref 4.6–13.2)

## 2017-04-10 PROCEDURE — 77030018836 HC SOL IRR NACL ICUM -A: Performed by: SURGERY

## 2017-04-10 PROCEDURE — 74011250637 HC RX REV CODE- 250/637: Performed by: INTERNAL MEDICINE

## 2017-04-10 PROCEDURE — 82550 ASSAY OF CK (CPK): CPT | Performed by: INTERNAL MEDICINE

## 2017-04-10 PROCEDURE — 85730 THROMBOPLASTIN TIME PARTIAL: CPT | Performed by: SURGERY

## 2017-04-10 PROCEDURE — 77030011256 HC DRSG MEPILEX <16IN NO BORD MOLN -A

## 2017-04-10 PROCEDURE — 77030010507 HC ADH SKN DERMBND J&J -B: Performed by: SURGERY

## 2017-04-10 PROCEDURE — 36415 COLL VENOUS BLD VENIPUNCTURE: CPT | Performed by: INTERNAL MEDICINE

## 2017-04-10 PROCEDURE — 76060000034 HC ANESTHESIA 1.5 TO 2 HR: Performed by: SURGERY

## 2017-04-10 PROCEDURE — 76010000197 HC CV SURG 1.5 TO 2 HR INTENSV-TIER 1: Performed by: SURGERY

## 2017-04-10 PROCEDURE — 77030011640 HC PAD GRND REM COVD -A: Performed by: SURGERY

## 2017-04-10 PROCEDURE — 74011250636 HC RX REV CODE- 250/636

## 2017-04-10 PROCEDURE — 77030010512 HC APPL CLP LIG J&J -C: Performed by: SURGERY

## 2017-04-10 PROCEDURE — 83735 ASSAY OF MAGNESIUM: CPT | Performed by: SURGERY

## 2017-04-10 PROCEDURE — 77030034850: Performed by: SURGERY

## 2017-04-10 PROCEDURE — 74011250636 HC RX REV CODE- 250/636: Performed by: SURGERY

## 2017-04-10 PROCEDURE — C1768 GRAFT, VASCULAR: HCPCS | Performed by: SURGERY

## 2017-04-10 PROCEDURE — 36415 COLL VENOUS BLD VENIPUNCTURE: CPT | Performed by: SURGERY

## 2017-04-10 PROCEDURE — 74011000258 HC RX REV CODE- 258: Performed by: INTERNAL MEDICINE

## 2017-04-10 PROCEDURE — 74011250637 HC RX REV CODE- 250/637: Performed by: SURGERY

## 2017-04-10 PROCEDURE — 74011000250 HC RX REV CODE- 250: Performed by: INTERNAL MEDICINE

## 2017-04-10 PROCEDURE — 77030002986 HC SUT PROL J&J -A: Performed by: SURGERY

## 2017-04-10 PROCEDURE — 85025 COMPLETE CBC W/AUTO DIFF WBC: CPT | Performed by: SURGERY

## 2017-04-10 PROCEDURE — 80048 BASIC METABOLIC PNL TOTAL CA: CPT | Performed by: INTERNAL MEDICINE

## 2017-04-10 PROCEDURE — 77030002933 HC SUT MCRYL J&J -A: Performed by: SURGERY

## 2017-04-10 PROCEDURE — 77030002996 HC SUT SLK J&J -A: Performed by: SURGERY

## 2017-04-10 PROCEDURE — 77030002924 HC SUT GORTX WLGO -B: Performed by: SURGERY

## 2017-04-10 PROCEDURE — 74011000250 HC RX REV CODE- 250

## 2017-04-10 PROCEDURE — 65620000000 HC RM CCU GENERAL

## 2017-04-10 PROCEDURE — 74011000258 HC RX REV CODE- 258

## 2017-04-10 DEVICE — GRAFT VASC L50CM DIA8MM RNG L40CM EPTFE THN WALLED CBAS HEP: Type: IMPLANTABLE DEVICE | Site: LEG | Status: FUNCTIONAL

## 2017-04-10 RX ORDER — LIDOCAINE HYDROCHLORIDE 10 MG/ML
INJECTION, SOLUTION EPIDURAL; INFILTRATION; INTRACAUDAL; PERINEURAL
Status: DISPENSED
Start: 2017-04-10 | End: 2017-04-11

## 2017-04-10 RX ORDER — LIDOCAINE HYDROCHLORIDE 20 MG/ML
INJECTION, SOLUTION EPIDURAL; INFILTRATION; INTRACAUDAL; PERINEURAL AS NEEDED
Status: DISCONTINUED | OUTPATIENT
Start: 2017-04-10 | End: 2017-04-10 | Stop reason: HOSPADM

## 2017-04-10 RX ORDER — SODIUM CHLORIDE 9 MG/ML
INJECTION, SOLUTION INTRAVENOUS
Status: DISCONTINUED | OUTPATIENT
Start: 2017-04-10 | End: 2017-04-10 | Stop reason: HOSPADM

## 2017-04-10 RX ORDER — FENTANYL CITRATE 50 UG/ML
INJECTION, SOLUTION INTRAMUSCULAR; INTRAVENOUS AS NEEDED
Status: DISCONTINUED | OUTPATIENT
Start: 2017-04-10 | End: 2017-04-10 | Stop reason: HOSPADM

## 2017-04-10 RX ORDER — PROPOFOL 10 MG/ML
INJECTION, EMULSION INTRAVENOUS AS NEEDED
Status: DISCONTINUED | OUTPATIENT
Start: 2017-04-10 | End: 2017-04-10 | Stop reason: HOSPADM

## 2017-04-10 RX ORDER — HEPARIN SODIUM 200 [USP'U]/100ML
INJECTION, SOLUTION INTRAVENOUS
Status: DISCONTINUED | OUTPATIENT
Start: 2017-04-10 | End: 2017-04-10 | Stop reason: HOSPADM

## 2017-04-10 RX ORDER — MAGNESIUM SULFATE HEPTAHYDRATE 40 MG/ML
2 INJECTION, SOLUTION INTRAVENOUS ONCE
Status: COMPLETED | OUTPATIENT
Start: 2017-04-10 | End: 2017-04-10

## 2017-04-10 RX ORDER — CEFAZOLIN SODIUM 1 G/3ML
INJECTION, POWDER, FOR SOLUTION INTRAMUSCULAR; INTRAVENOUS AS NEEDED
Status: DISCONTINUED | OUTPATIENT
Start: 2017-04-10 | End: 2017-04-10 | Stop reason: HOSPADM

## 2017-04-10 RX ORDER — SUCCINYLCHOLINE CHLORIDE 20 MG/ML
INJECTION INTRAMUSCULAR; INTRAVENOUS AS NEEDED
Status: DISCONTINUED | OUTPATIENT
Start: 2017-04-10 | End: 2017-04-10 | Stop reason: HOSPADM

## 2017-04-10 RX ORDER — MIDAZOLAM HYDROCHLORIDE 1 MG/ML
INJECTION, SOLUTION INTRAMUSCULAR; INTRAVENOUS AS NEEDED
Status: DISCONTINUED | OUTPATIENT
Start: 2017-04-10 | End: 2017-04-10 | Stop reason: HOSPADM

## 2017-04-10 RX ORDER — HYDROMORPHONE HYDROCHLORIDE 2 MG/ML
INJECTION, SOLUTION INTRAMUSCULAR; INTRAVENOUS; SUBCUTANEOUS AS NEEDED
Status: DISCONTINUED | OUTPATIENT
Start: 2017-04-10 | End: 2017-04-10 | Stop reason: HOSPADM

## 2017-04-10 RX ORDER — CEFAZOLIN SODIUM 2 G/50ML
2 SOLUTION INTRAVENOUS EVERY 8 HOURS
Status: COMPLETED | OUTPATIENT
Start: 2017-04-10 | End: 2017-04-11

## 2017-04-10 RX ORDER — HEPARIN SODIUM 1000 [USP'U]/ML
INJECTION, SOLUTION INTRAVENOUS; SUBCUTANEOUS AS NEEDED
Status: DISCONTINUED | OUTPATIENT
Start: 2017-04-10 | End: 2017-04-10 | Stop reason: HOSPADM

## 2017-04-10 RX ORDER — IODIXANOL 320 MG/ML
INJECTION, SOLUTION INTRAVASCULAR
Status: DISPENSED
Start: 2017-04-10 | End: 2017-04-11

## 2017-04-10 RX ORDER — HEPARIN SODIUM 200 [USP'U]/100ML
INJECTION, SOLUTION INTRAVENOUS
Status: DISPENSED
Start: 2017-04-10 | End: 2017-04-11

## 2017-04-10 RX ORDER — ESMOLOL HYDROCHLORIDE 10 MG/ML
INJECTION INTRAVENOUS AS NEEDED
Status: DISCONTINUED | OUTPATIENT
Start: 2017-04-10 | End: 2017-04-10 | Stop reason: HOSPADM

## 2017-04-10 RX ADMIN — MIDAZOLAM HYDROCHLORIDE 2 MG: 1 INJECTION, SOLUTION INTRAMUSCULAR; INTRAVENOUS at 17:11

## 2017-04-10 RX ADMIN — DIGOXIN 0.12 MG: 0.12 TABLET ORAL at 09:04

## 2017-04-10 RX ADMIN — Medication 10 ML: at 14:04

## 2017-04-10 RX ADMIN — PROPOFOL 150 MG: 10 INJECTION, EMULSION INTRAVENOUS at 17:18

## 2017-04-10 RX ADMIN — SODIUM CHLORIDE: 9 INJECTION, SOLUTION INTRAVENOUS at 17:16

## 2017-04-10 RX ADMIN — Medication 1000 MCG: at 09:04

## 2017-04-10 RX ADMIN — HYDROMORPHONE HYDROCHLORIDE 0.4 MG: 2 INJECTION, SOLUTION INTRAMUSCULAR; INTRAVENOUS; SUBCUTANEOUS at 17:49

## 2017-04-10 RX ADMIN — LIDOCAINE HYDROCHLORIDE 60 MG: 20 INJECTION, SOLUTION EPIDURAL; INFILTRATION; INTRACAUDAL; PERINEURAL at 17:17

## 2017-04-10 RX ADMIN — ESMOLOL HYDROCHLORIDE 20 MG: 10 INJECTION INTRAVENOUS at 17:24

## 2017-04-10 RX ADMIN — CEFAZOLIN SODIUM 2 G: 2 SOLUTION INTRAVENOUS at 20:25

## 2017-04-10 RX ADMIN — ATORVASTATIN CALCIUM 40 MG: 40 TABLET, FILM COATED ORAL at 09:04

## 2017-04-10 RX ADMIN — HEPARIN SODIUM AND DEXTROSE 13 UNITS/KG/HR: 10000; 5 INJECTION INTRAVENOUS at 12:27

## 2017-04-10 RX ADMIN — FENTANYL CITRATE 100 MCG: 50 INJECTION, SOLUTION INTRAMUSCULAR; INTRAVENOUS at 17:42

## 2017-04-10 RX ADMIN — OXYCODONE HYDROCHLORIDE AND ACETAMINOPHEN 2 TABLET: 5; 325 TABLET ORAL at 00:51

## 2017-04-10 RX ADMIN — OXYCODONE HYDROCHLORIDE AND ACETAMINOPHEN 2 TABLET: 5; 325 TABLET ORAL at 12:24

## 2017-04-10 RX ADMIN — Medication 2 MG: at 15:42

## 2017-04-10 RX ADMIN — MAGNESIUM SULFATE HEPTAHYDRATE 2 G: 40 INJECTION, SOLUTION INTRAVENOUS at 11:42

## 2017-04-10 RX ADMIN — Medication 2 MG: at 09:11

## 2017-04-10 RX ADMIN — OXYCODONE HYDROCHLORIDE AND ACETAMINOPHEN 2 TABLET: 5; 325 TABLET ORAL at 05:08

## 2017-04-10 RX ADMIN — HYDROMORPHONE HYDROCHLORIDE 0.5 MG: 2 INJECTION, SOLUTION INTRAMUSCULAR; INTRAVENOUS; SUBCUTANEOUS at 03:00

## 2017-04-10 RX ADMIN — SODIUM CHLORIDE 5 MG/HR: 900 INJECTION, SOLUTION INTRAVENOUS at 19:55

## 2017-04-10 RX ADMIN — HYDROMORPHONE HYDROCHLORIDE 0.4 MG: 2 INJECTION, SOLUTION INTRAMUSCULAR; INTRAVENOUS; SUBCUTANEOUS at 19:12

## 2017-04-10 RX ADMIN — METOPROLOL TARTRATE 50 MG: 50 TABLET ORAL at 09:03

## 2017-04-10 RX ADMIN — HYDROMORPHONE HYDROCHLORIDE 0.5 MG: 2 INJECTION, SOLUTION INTRAMUSCULAR; INTRAVENOUS; SUBCUTANEOUS at 04:00

## 2017-04-10 RX ADMIN — HEPARIN SODIUM 3000 UNITS: 1000 INJECTION, SOLUTION INTRAVENOUS; SUBCUTANEOUS at 18:01

## 2017-04-10 RX ADMIN — Medication 10 ML: at 05:18

## 2017-04-10 RX ADMIN — SUCCINYLCHOLINE CHLORIDE 120 MG: 20 INJECTION INTRAMUSCULAR; INTRAVENOUS at 17:18

## 2017-04-10 RX ADMIN — ASPIRIN 81 MG CHEWABLE TABLET 81 MG: 81 TABLET CHEWABLE at 09:03

## 2017-04-10 RX ADMIN — SODIUM CHLORIDE 5 MG/HR: 900 INJECTION, SOLUTION INTRAVENOUS at 03:56

## 2017-04-10 RX ADMIN — DOCUSATE SODIUM 100 MG: 100 CAPSULE, LIQUID FILLED ORAL at 09:04

## 2017-04-10 RX ADMIN — METOPROLOL TARTRATE 50 MG: 50 TABLET ORAL at 20:26

## 2017-04-10 RX ADMIN — CLOPIDOGREL 75 MG: 75 TABLET, FILM COATED ORAL at 09:03

## 2017-04-10 RX ADMIN — FENTANYL CITRATE 100 MCG: 50 INJECTION, SOLUTION INTRAMUSCULAR; INTRAVENOUS at 17:17

## 2017-04-10 RX ADMIN — CEFAZOLIN SODIUM 1 G: 1 INJECTION, POWDER, FOR SOLUTION INTRAMUSCULAR; INTRAVENOUS at 17:38

## 2017-04-10 RX ADMIN — HYDROMORPHONE HYDROCHLORIDE 0.2 MG: 2 INJECTION, SOLUTION INTRAMUSCULAR; INTRAVENOUS; SUBCUTANEOUS at 19:00

## 2017-04-10 RX ADMIN — Medication 2 MG: at 11:38

## 2017-04-10 NOTE — PROGRESS NOTES
OT orders received and chart reviewed. Patient scheduled for right fem-pop bypass today. Will sign off per protocol and re-order post-op when patient is medically stable. Thank you.      Verdell Boast OTR/L

## 2017-04-10 NOTE — ANESTHESIA POSTPROCEDURE EVALUATION
Post-Anesthesia Evaluation and Assessment    Patient: Donato Napier MRN: [id number]  SSN: [id number]    YOB: 1960  Age: 59 y.o. Sex: male       Cardiovascular Function/Vital Signs  Visit Vitals    /90 (BP 1 Location: Right arm, BP Patient Position: At rest)    Pulse (!) 120    Temp 36.6 °C (97.8 °F)    Resp 18    Ht 5' 10\" (1.778 m)    Wt 80.5 kg (177 lb 6.4 oz)    SpO2 100%    BMI 25.45 kg/m2       Patient is status post general anesthesia for Procedure(s): FEMORAL-POPLITEAL BYPASS/WITH GRAFT RIGHT LEG. Nausea/Vomiting: None    Postoperative hydration reviewed and adequate. Pain:  Pain Scale 1: Numeric (0 - 10) (04/10/17 1612)  Pain Intensity 1: 5 (04/10/17 1612)   Managed    Neurological Status:   Neuro (WDL): Within Defined Limits (04/04/17 0727)  Neuro  Neurologic State: Alert (04/10/17 0800)  Orientation Level: Oriented X4 (04/10/17 0800)  Cognition: Appropriate decision making; Appropriate for age attention/concentration; Appropriate safety awareness; Follows commands (04/10/17 0800)  Speech: Clear (04/10/17 0800)   At baseline    Mental Status and Level of Consciousness: Arousable    Pulmonary Status:   O2 Device: Nasal cannula (04/10/17 1914)   Adequate oxygenation and airway patent    Complications related to anesthesia: None    Post-anesthesia assessment completed.  No concerns    Signed By: Himanshu Moraes MD     April 10, 2017

## 2017-04-10 NOTE — PROGRESS NOTES
NUTRITION    BPA/MST Referral       RECOMMENDATIONS / PLAN:     - Add nutritional supplements: Ensure Enlive TID  - Add daily MVI  - Continue RD inpatient monitoring and evaluation. NUTRITION INTERVENTIONS & DIAGNOSIS:     [x] Meals/Snacks: modified diet  [x] Medical food supplementation: initiate     Nutrition Diagnosis: Increased nutrient needs related to wound healing as evidenced by surgical incisions and pressure ulcer. ASSESSMENT:     Subjective/Objective: Reports decreased appetite. NPO for surgery today. Discussed supplements; pt agreeable.       Average po intake adequate to meet patients estimated nutritional needs:   [] Yes     [x] No   [] Unable to determine at this time    Diet: DIET NPO Except Meds      Food Allergies: NKFA  Current Appetite:   [] Good     [x] Fair     [] Poor     [] Other:  Appetite/meal intake prior to admission:   [] Good     [x] Fair     [] Poor     [] Other:  Feeding Limitations:  [] Swallowing difficulty    [] Chewing difficulty    [] Other:  Current Meal Intake: Patient Vitals for the past 100 hrs:   % Diet Eaten   04/09/17 1720 45 %   04/09/17 1243 50 %   04/09/17 1200 75 %   04/09/17 0800 75 %   04/08/17 1746 75 %   04/08/17 1312 50 %   04/07/17 0800 0 %       BM:  4/6  Skin Integrity: surgical incision to right axilla, right groin, left groin, right leg; unstageable pressure ulcer to heel   Edema: none  Pertinent Medications: Reviewed: cyanocobalamin     Recent Labs      04/10/17   0515  04/09/17   0920  04/08/17   0835   NA  130*  132*  131*   K  4.0  3.6  3.5   CL  94*  93*  92*   CO2  27  29  31   GLU  89  94  117*   BUN  22*  24*  31*   CREA  1.42*  1.56*  1.90*   CA  8.6  8.7  8.1*   MG  1.8  1.6  1.8       Intake/Output Summary (Last 24 hours) at 04/10/17 1536  Last data filed at 04/10/17 1428   Gross per 24 hour   Intake          1063.62 ml   Output             3000 ml   Net         -1936.38 ml       Anthropometrics:  Ht Readings from Last 1 Encounters: 03/30/17 5' 10\" (1.778 m)     Last 3 Recorded Weights in this Encounter    04/04/17 0722 04/07/17 0615 04/09/17 0500   Weight: 77.1 kg (170 lb) 81.6 kg (179 lb 14.4 oz) 80.5 kg (177 lb 6.4 oz)     Body mass index is 25.45 kg/(m^2). Weight History: reports weight loss from 190# over the last month. Weight Metrics 4/9/2017 4/4/2017 3/29/2017 3/17/2017 3/16/2017 3/8/2017 2/27/2017   Weight 177 lb 6.4 oz - 170 lb 170 lb 170 lb 9.6 oz 189 lb 179 lb   BMI - 25.45 kg/m2 24.39 kg/m2 24.39 kg/m2 24.48 kg/m2 27.12 kg/m2 25.68 kg/m2        Admitting Diagnosis: Atheroscler of native artery of both legs with intermit claudication ([facility] [I70.213]  Past Medical History:   Diagnosis Date    [facility]fib ([facility]     Abnormal EKG     CAD S/P percutaneous coronary angioplasty 03/2017    Essential hypertension     Lumbar canal stenosis 05/04/2015    Dr. Spenser Finch PVD (peripheral vascular disease) [facility])        Education Needs:        [x] None identified  [] Identified - Not appropriate at this time  []  Identified and addressed - refer to education log  Learning Limitations:   [x] None identified  [] Identified    Cultural, Baptist & ethnic food preferences:  [x] None identified    [] Identified and addressed     ESTIMATED NUTRITION NEEDS:     Calories: 1222-3812 kcal (30-35 kcal/kg) based on  [x] Actual BW: 81 kg      [] IBW   Protein: 105-121 gm (1.3-1.5 gm/kg) based on  [x] Actual BW      [] IBW   Fluid: 1 mL/kcal     MONITORING & EVALUATION:     Nutrition Goal(s):   1. Po intake of meals will meet >75% of patient estimated nutritional needs within the next 7 days.   Outcome:  [] Met/Ongoing    []  Not Met    [x] New/Initial Goal     Monitoring:   [x] Diet tolerance   [x] Meal intake   [] Supplement intake   [] GI symptoms/ability to tolerate po diet   [] Respiratory status   [] Plan of care      Previous Recommendations (for follow-up assessments only):     []   Implemented       []   Not Implemented (RD to address) [] No Recommendation Made     Discharge Planning: cardiac diet    [x] Participated in care planning, discharge planning, & interdisciplinary rounds as appropriate      Reynaldo Gonzalez, RD, [address] [place]    Pager: [phone]

## 2017-04-10 NOTE — PROGRESS NOTES
conducted a Follow up consultation and Spiritual Assessment for Anson King, who is a 59 y.o.,male. The  provided the following Interventions:  Continued the relationship of care and support. Patient said he is doing all right. Two visitors are with him. Listened empathically. Patient sounded hopeful. He spoke of his Susie Oumou dl as important to his well being. Offered assurance of continued prayer on patients behalf. Chart reviewed. The following outcomes were achieved:  Patient expressed gratitude for pastoral care visit. Assessment:  There are no further spiritual or Adventist issues which require Spiritual Care Services interventions at this time. Plan:  Chaplains will continue to follow and will provide pastoral care on an as needed/requested basis.  recommends bedside caregivers page  on duty if patient shows signs of acute spiritual or emotional distress. Abram Murray MDiv.   Board Certified [employer] [phone]

## 2017-04-10 NOTE — PROGRESS NOTES
Continued rest pain in right leg. States no better since surgery and back to baseline. Given angiogram will require fem-pop bypass. Spoke with Dr. La Carbone he is agreeable to performing surgery. Spoke with patient and daughter and both are understanding of surgery and having performed by Dr. La Carbone. No acute need for surgery at this time.

## 2017-04-10 NOTE — PROGRESS NOTES
Pt scheduled for right fem-pop bypass today. Will discontinue PT order. Please re-order s/p surgery. Thank you for this referral. Deb Camargo, PT, DPT.

## 2017-04-10 NOTE — ANESTHESIA PREPROCEDURE EVALUATION
Anesthetic History   No history of anesthetic complications            Review of Systems / Medical History  Patient summary reviewed, nursing notes reviewed and pertinent labs reviewed    Pulmonary  Within defined limits                 Neuro/Psych   Within defined limits           Cardiovascular    Hypertension: well controlled        Dysrhythmias : atrial fibrillation  CAD    Exercise tolerance: >4 METS     GI/Hepatic/Renal  Within defined limits              Endo/Other  Within defined limits      Arthritis     Other Findings   Comments:   Risk Factors for Postoperative nausea/vomiting:       History of postoperative nausea/vomiting? NO       Female? NO       Motion sickness? NO       Intended opioid administration for postoperative analgesia?   NO  Not a smoker           Physical Exam    Airway  Mallampati: I  TM Distance: 4 - 6 cm  Neck ROM: normal range of motion   Mouth opening: Normal     Cardiovascular    Rhythm: irregular           Dental    Dentition: Full lower dentures and Full upper dentures     Pulmonary  Breath sounds clear to auscultation               Abdominal  GI exam deferred       Other Findings            Anesthetic Plan    ASA: 3  Anesthesia type: general          Induction: Intravenous  Anesthetic plan and risks discussed with: Patient and Family

## 2017-04-10 NOTE — ROUTINE PROCESS
Bedside and Verbal shift change report given to BEAU Sevilla (oncoming nurse) by Roger Mckeon RN (offgoing nurse). Report included the following information SBAR, Kardex, MAR and Recent Results. SITUATION:    Code Status: Full Code   Reason for Admission: Atheroscler of native artery of both legs with intermit claudication ([facility] [address] day: 6   Problem List:       Hospital Problems  Date Reviewed: 4/4/2017          Codes Class Noted POA    Aortoiliac occlusive disease ([facility] ICD-10-CM: I74.09  ICD-9-CM: 444.09  1/25/2017 Unknown        Atherosclerosis of native artery of both lower extremities with intermittent claudication ([facility] ICD-10-CM: A03.111  ICD-9-CM: 440.21  1/25/2017 Unknown        PAD (peripheral artery disease) ([facility] ICD-10-CM: I73.9  ICD-9-CM: 443.9  1/25/2017 Unknown              BACKGROUND:    Past Medical History:   Past Medical History:   Diagnosis Date    A-fib ([facility]     Abnormal EKG     CAD S/P percutaneous coronary angioplasty 03/2017    Essential hypertension     Lumbar canal stenosis 05/04/2015    Dr. Bernie Rich PVD (peripheral vascular disease) [facility])          Patient taking anticoagulants yes (Heparin gtt)    ASSESSMENT:    Changes in Assessment Throughout Shift: Pt went for surgery.      Patient has Central Line: no Reasons if yes: n/a   Patient has Jiang Cath: yes Reasons if yes: surgical procedure      Last Vitals:     Vitals:    04/10/17 1400 04/10/17 1500 04/10/17 1600 04/10/17 1623   BP: 108/76 109/78 119/64 119/64   Pulse: (!) 102 (!) 104 (!) 118 (!) 110   Resp: 21 23 21 30   Temp:   98.5 °F (36.9 °C)    SpO2:       Weight:       Height:            IV and DRAINS (will only show if present)   [REMOVED] Peripheral IV 04/04/17 Left Antecubital-Site Assessment: Clean, dry, & intact  [REMOVED] Peripheral IV 04/04/17 Right Antecubital-Site Assessment: Clean, dry, & intact  [REMOVED] Arterial Line 04/04/17 Left Radial artery-Site Assessment: Clean, dry, & intact  Peripheral IV 04/10/17 Left Antecubital-Site Assessment: Clean, dry, & intact     WOUND (if present)   Wound Type:  See wound doc flowsheet   Dressing present Dressing Present : No   Wound Concerns/Notes:  none     PAIN    Pain Assessment    Pain Intensity 1: 5 (04/10/17 1612)    Pain Location 1: Leg    Pain Intervention(s) 1: Medication (see MAR)    Patient Stated Pain Goal: 5  o Interventions for Pain:  Morphine 2mg  o Intervention effective: yes  o Time of last intervention: 1542   o Reassessment Completed: yes      Last 3 Weights:  Last 3 Recorded Weights in this Encounter    04/04/17 0722 04/07/17 0615 04/09/17 0500   Weight: 77.1 kg (170 lb) 81.6 kg (179 lb 14.4 oz) 80.5 kg (177 lb 6.4 oz)     Weight change:      INTAKE/OUPUT    Current Shift: 04/10 0701 - 04/10 1900  In: 192 [I.V.:192]  Out: 1550 [EXIUY:0984]    Last three shifts: 04/08 1901 - 04/10 0700  In: 2778.1 [P.O.:1930; I.V.:848.1]  Out: 2800 [Urine:2800]     LAB RESULTS     Recent Labs      04/10/17   1515  04/09/17   0920  04/08/17   0835   WBC  17.5*  15.6*  13.6*   HGB  8.5*  8.9*  8.4*   HCT  24.0*  25.3*  23.7*   PLT  400  403  310        Recent Labs      04/10/17   0515  04/09/17   0920  04/08/17   0835   NA  130*  132*  131*   K  4.0  3.6  3.5   GLU  89  94  117*   BUN  22*  24*  31*   CREA  1.42*  1.56*  1.90*   CA  8.6  8.7  8.1*   MG  1.8  1.6  1.8       RECOMMENDATIONS AND DISCHARGE PLANNING     1. Pending tests/procedures/ Plan of Care or Other Needs: Pt had right fem-fem bypass with iliac to fem graft     2. Discharge plan for patient and Needs/Barriers: TBD    3. Estimated Discharge Date: TBD Posted on Whiteboard in Patients Room: yes      4. The patient's care plan was reviewed with the oncoming nurse.        \"HEALS\" SAFETY CHECK      Fall Risk    Total Score: 3    Safety Measures: Safety Measures: Bed/Chair alarm on, Bed/Chair-Wheels locked, Bed in low position, Call light within reach, Emergency bedside equipment, Side rails X 3    A safety check occurred in the patient's room between off going nurse and oncoming nurse listed above. The safety check included the below items  Area Items   H  High Alert Medications - Verify all high alert medication drips (heparin, PCA, etc.)   E  Equipment - Suction is set up for ALL patients (with cailin)  - Red plugs utilized for all equipment (IV pumps, etc.)  - WOWs wiped down at end of shift.  - Room stocked with oxygen, suction, and other unit-specific supplies   A  Alarms - Bed alarm is set for fall risk patients  - Ensure chair alarm is in place and activated if patient is up in a chair   L  Lines - Check IV for any infiltration  - Jiang bag is empty if patient has a Jiang   - Tubing and IV bags are labeled   S  Safety   - Room is clean, patient is clean, and equipment is clean. - Hallways are clear from equipment besides carts. - Fall bracelet on for fall risk patients  - Ensure room is clear and free of clutter  - Suction is set up for ALL patients (with cailin)  - Hallways are clear from equipment besides carts.    - Isolation precautions followed, supplies available outside room, sign posted     Chantell Pena, RN

## 2017-04-10 NOTE — PROGRESS NOTES
[facility] - Progress Note  Admit Date: 4/4/2017    Assessment:     Hospital Problems  Date Reviewed: 4/4/2017          Codes Class Noted POA    Aortoiliac occlusive disease ([facility] ICD-10-CM: I74.09  ICD-9-CM: 444.09  1/25/2017 Unknown        Atherosclerosis of native artery of both lower extremities with intermittent claudication ([facility] ICD-10-CM: S16.395  ICD-9-CM: 440.21  1/25/2017 Unknown        PAD (peripheral artery disease) ([facility] ICD-10-CM: I73.9  ICD-9-CM: 443.9  1/25/2017 Unknown              - A.fib w/RVR in setting of known persistent atrial fibrillation. Heart rate was elevated as outpatient as well. Asymptomatic.   - PAD, s/p aortoiliac occlusive disease s/p R axillary fem bypass 4/4/17  - Acute Kidney Injury; Cr 4 on 4/5/2017 improved to 1.90 yesterday  - Cardiomyopathy, ischemic. ECHO 2/24/17 EF 35-40% mod. idffuse hypokinesis  - s/p cath 3/15/17 with drug eluding stents to LCx and RCA  - Asymptomatic carotid artery disease  - Essential Hypertension  - Hoarse voice; over the past month  - Weight loss; 20lbs over the past month  - Anemia with H&H continuing to slowly drop down to 23.7 yesterday    Plan:     Tentatively scheduled for fempop today. A.fib rates stable on diltiazem drip, continue for now. Will follow. Subjective:     No new complaints.      Objective:      Patient Vitals for the past 8 hrs:   Temp Pulse Resp BP   04/10/17 1100 - (!) 106 25 -   04/10/17 1000 - 96 18 98/78   04/10/17 0903 - (!) 118 - 101/72   04/10/17 0900 - (!) 122 25 101/72   04/10/17 0800 98.5 °F (36.9 °C) (!) 110 25 110/75   04/10/17 0700 - (!) 106 21 93/70   04/10/17 0600 - 90 19 99/69         Patient Vitals for the past 96 hrs:   Weight   04/09/17 0500 80.5 kg (177 lb 6.4 oz)   04/07/17 0615 81.6 kg (179 lb 14.4 oz)                    Intake/Output Summary (Last 24 hours) at 04/10/17 1240  Last data filed at 04/10/17 1142   Gross per 24 hour   Intake          1332.62 ml   Output             2375 ml Net         -1042.38 ml       Physical Exam:  General:  alert, cooperative, no distress, appears stated age  Neck:  nontender  Lungs:  clear to auscultation bilaterally  Heart:  irreg irreg, S1, S2 normal, no murmur, click, rub or gallop  Abdomen:  abdomen is soft without significant tenderness, masses, organomegaly or guarding  Extremities:  extremities normal, atraumatic, no cyanosis or edema    Data Review:     Labs: Results:       Chemistry Recent Labs      04/10/17   0515  04/09/17   0920  04/08/17   0835   GLU  89  94  117*   NA  130*  132*  131*   K  4.0  3.6  3.5   CL  94*  93*  92*   CO2  27  29  31   BUN  22*  24*  31*   CREA  1.42*  1.56*  1.90*   CA  8.6  8.7  8.1*   MG  1.8  1.6  1.8   AGAP  9  10  8   BUCR  15  15  16      CBC w/Diff Recent Labs      04/09/17 0920  04/08/17   0835   WBC  15.6*  13.6*   RBC  2.96*  2.81*   HGB  8.9*  8.4*   HCT  25.3*  23.7*   PLT  403  310   GRANS  81*  79*   LYMPH  9*  10*   EOS  0  0      Cardiac Enzymes Lab Results   Component Value Date/Time     (H) 04/10/2017 05:15 AM      Coagulation Recent Labs      04/10/17   0515  04/09/17   2339   APTT  >180.0*  138.3*       Lipid Panel Lab Results   Component Value Date/Time    Cholesterol, total 157 12/29/2015 07:18 AM    HDL Cholesterol 28 12/29/2015 07:18 AM    LDL, calculated 112.4 12/29/2015 07:18 AM    VLDL, calculated 16.6 12/29/2015 07:18 AM    Triglyceride 83 12/29/2015 07:18 AM    CHOL/HDL Ratio 5.6 12/29/2015 07:18 AM      BNP No results found for: BNP, BNPP, XBNPT   Liver Enzymes No results for input(s): TP, ALB, TBIL, AP, SGOT, GPT in the last 72 hours.     No lab exists for component: DBIL   Digoxin    Thyroid Studies Lab Results   Component Value Date/Time    TSH 4.77 04/06/2017 05:30 AM          Signed By: Tammi Carpenter MD     April 10, 2017

## 2017-04-11 LAB
ANION GAP BLD CALC-SCNC: 8 MMOL/L (ref 3–18)
APTT PPP: 105.3 SEC (ref 23–36.4)
APTT PPP: 111.2 SEC (ref 23–36.4)
APTT PPP: 66.6 SEC (ref 23–36.4)
BASOPHILS # BLD AUTO: 0 K/UL (ref 0–0.1)
BASOPHILS # BLD: 0 % (ref 0–2)
BUN SERPL-MCNC: 16 MG/DL (ref 7–18)
BUN/CREAT SERPL: 16 (ref 12–20)
CALCIUM SERPL-MCNC: 8 MG/DL (ref 8.5–10.1)
CHLORIDE SERPL-SCNC: 99 MMOL/L (ref 100–108)
CO2 SERPL-SCNC: 28 MMOL/L (ref 21–32)
CREAT SERPL-MCNC: 1 MG/DL (ref 0.6–1.3)
DIFFERENTIAL METHOD BLD: ABNORMAL
EOSINOPHIL # BLD: 0.1 K/UL (ref 0–0.4)
EOSINOPHIL NFR BLD: 1 % (ref 0–5)
ERYTHROCYTE [DISTWIDTH] IN BLOOD BY AUTOMATED COUNT: 15.1 % (ref 11.6–14.5)
GLUCOSE BLD STRIP.AUTO-MCNC: 118 MG/DL (ref 70–110)
GLUCOSE BLD STRIP.AUTO-MCNC: 126 MG/DL (ref 70–110)
GLUCOSE BLD STRIP.AUTO-MCNC: 147 MG/DL (ref 70–110)
GLUCOSE SERPL-MCNC: 120 MG/DL (ref 74–99)
HCT VFR BLD AUTO: 21 % (ref 36–48)
HGB BLD-MCNC: 7.3 G/DL (ref 13–16)
LYMPHOCYTES # BLD AUTO: 8 % (ref 21–52)
LYMPHOCYTES # BLD: 1.3 K/UL (ref 0.9–3.6)
MCH RBC QN AUTO: 29.8 PG (ref 24–34)
MCHC RBC AUTO-ENTMCNC: 34.8 G/DL (ref 31–37)
MCV RBC AUTO: 85.7 FL (ref 74–97)
MONOCYTES # BLD: 1.5 K/UL (ref 0.05–1.2)
MONOCYTES NFR BLD AUTO: 9 % (ref 3–10)
NEUTS SEG # BLD: 12.9 K/UL (ref 1.8–8)
NEUTS SEG NFR BLD AUTO: 82 % (ref 40–73)
PLATELET # BLD AUTO: 380 K/UL (ref 135–420)
PMV BLD AUTO: 8.7 FL (ref 9.2–11.8)
POTASSIUM SERPL-SCNC: 3.8 MMOL/L (ref 3.5–5.5)
RBC # BLD AUTO: 2.45 M/UL (ref 4.7–5.5)
SODIUM SERPL-SCNC: 135 MMOL/L (ref 136–145)
WBC # BLD AUTO: 15.9 K/UL (ref 4.6–13.2)

## 2017-04-11 PROCEDURE — 86920 COMPATIBILITY TEST SPIN: CPT | Performed by: PHYSICIAN ASSISTANT

## 2017-04-11 PROCEDURE — 36430 TRANSFUSION BLD/BLD COMPNT: CPT

## 2017-04-11 PROCEDURE — 86850 RBC ANTIBODY SCREEN: CPT | Performed by: PHYSICIAN ASSISTANT

## 2017-04-11 PROCEDURE — 82962 GLUCOSE BLOOD TEST: CPT

## 2017-04-11 PROCEDURE — 80048 BASIC METABOLIC PNL TOTAL CA: CPT | Performed by: INTERNAL MEDICINE

## 2017-04-11 PROCEDURE — 74011250637 HC RX REV CODE- 250/637: Performed by: SURGERY

## 2017-04-11 PROCEDURE — 65620000000 HC RM CCU GENERAL

## 2017-04-11 PROCEDURE — 74011250636 HC RX REV CODE- 250/636: Performed by: SURGERY

## 2017-04-11 PROCEDURE — 36415 COLL VENOUS BLD VENIPUNCTURE: CPT | Performed by: SURGERY

## 2017-04-11 PROCEDURE — 85730 THROMBOPLASTIN TIME PARTIAL: CPT | Performed by: INTERNAL MEDICINE

## 2017-04-11 PROCEDURE — P9016 RBC LEUKOCYTES REDUCED: HCPCS | Performed by: PHYSICIAN ASSISTANT

## 2017-04-11 PROCEDURE — 74011000258 HC RX REV CODE- 258: Performed by: INTERNAL MEDICINE

## 2017-04-11 PROCEDURE — 74011250637 HC RX REV CODE- 250/637: Performed by: INTERNAL MEDICINE

## 2017-04-11 PROCEDURE — 74011000250 HC RX REV CODE- 250: Performed by: INTERNAL MEDICINE

## 2017-04-11 PROCEDURE — 85025 COMPLETE CBC W/AUTO DIFF WBC: CPT | Performed by: INTERNAL MEDICINE

## 2017-04-11 PROCEDURE — 85730 THROMBOPLASTIN TIME PARTIAL: CPT | Performed by: SURGERY

## 2017-04-11 RX ORDER — THERA TABS 400 MCG
1 TAB ORAL DAILY
Status: DISCONTINUED | OUTPATIENT
Start: 2017-04-12 | End: 2017-04-21 | Stop reason: HOSPADM

## 2017-04-11 RX ADMIN — CEFAZOLIN SODIUM 2 G: 2 SOLUTION INTRAVENOUS at 04:31

## 2017-04-11 RX ADMIN — OXYCODONE HYDROCHLORIDE AND ACETAMINOPHEN 2 TABLET: 5; 325 TABLET ORAL at 20:01

## 2017-04-11 RX ADMIN — ASPIRIN 81 MG CHEWABLE TABLET 81 MG: 81 TABLET CHEWABLE at 09:06

## 2017-04-11 RX ADMIN — HEPARIN SODIUM AND DEXTROSE 9 UNITS/KG/HR: 10000; 5 INJECTION INTRAVENOUS at 15:56

## 2017-04-11 RX ADMIN — Medication 10 ML: at 16:01

## 2017-04-11 RX ADMIN — DIGOXIN 0.12 MG: 0.12 TABLET ORAL at 09:06

## 2017-04-11 RX ADMIN — Medication 10 ML: at 22:00

## 2017-04-11 RX ADMIN — Medication 1000 MCG: at 09:06

## 2017-04-11 RX ADMIN — ATORVASTATIN CALCIUM 40 MG: 40 TABLET, FILM COATED ORAL at 09:06

## 2017-04-11 RX ADMIN — METOPROLOL TARTRATE 50 MG: 50 TABLET ORAL at 09:06

## 2017-04-11 RX ADMIN — SODIUM CHLORIDE 10 MG/HR: 900 INJECTION, SOLUTION INTRAVENOUS at 20:25

## 2017-04-11 RX ADMIN — OXYCODONE HYDROCHLORIDE AND ACETAMINOPHEN 2 TABLET: 5; 325 TABLET ORAL at 15:54

## 2017-04-11 RX ADMIN — Medication 10 ML: at 00:49

## 2017-04-11 RX ADMIN — METOPROLOL TARTRATE 75 MG: 50 TABLET ORAL at 20:27

## 2017-04-11 RX ADMIN — CLOPIDOGREL 75 MG: 75 TABLET, FILM COATED ORAL at 09:06

## 2017-04-11 RX ADMIN — DIPHENHYDRAMINE HYDROCHLORIDE 12.5 MG: 50 INJECTION, SOLUTION INTRAMUSCULAR; INTRAVENOUS at 01:00

## 2017-04-11 RX ADMIN — OXYCODONE HYDROCHLORIDE AND ACETAMINOPHEN 2 TABLET: 5; 325 TABLET ORAL at 01:17

## 2017-04-11 RX ADMIN — OXYCODONE HYDROCHLORIDE AND ACETAMINOPHEN 2 TABLET: 5; 325 TABLET ORAL at 12:00

## 2017-04-11 RX ADMIN — DOCUSATE SODIUM 100 MG: 100 CAPSULE, LIQUID FILLED ORAL at 17:35

## 2017-04-11 RX ADMIN — DOCUSATE SODIUM 100 MG: 100 CAPSULE, LIQUID FILLED ORAL at 09:06

## 2017-04-11 NOTE — PROGRESS NOTES
Bedside and Verbal shift change report given to Tessa Sibley RN  (oncoming nurse) by Shania Patrick RN  (offgoing nurse). Report included the following information SBAR, Kardex, ED Summary, OR Summary, Procedure Summary, Intake/Output, MAR, Recent Results, Med Rec Status and Cardiac Rhythm A. Fib .

## 2017-04-11 NOTE — PROGRESS NOTES
0830 Assumed care of pt. Pt in bed. Resting quietly. NAD. Pt in afib with RVR. cardizem gtt titrated to 10. Pt has 2 units of PRBCs ordered. His blood band is . Order for type and cross placed    1100 - type and cross still has not been done. Called lab and asked have phlebotomist come to unit. I attempted to draw myself. Pt is very difficult stick. 1130 - shift report given to Frank Alamo.

## 2017-04-11 NOTE — OP NOTES
[address]    Name:  Venice Wu  MR#:  [id number]  :  1960  Account #:  [de-identified]  Date of Adm:  2017  Date of Surgery:  04/10/2017      SURGEON:  Ara Andrews D.O.    PREOPERATIVE DIAGNOSIS:  Ischemic right limb femoral occlusive  disease. POSTOPERATIVE DIAGNOSIS:  Ischemic right limb femoral occlusive  disease. PROCEDURES PERFORMED:  Right femoral to above-knee popliteal  bypass with an 8 mm PTFE graft. ESTIMATED BLOOD LOSS:  Minimal.    SPECIMENS REMOVED:  None. ANESTHESIA:  General.    COMPLICATIONS ENCOUNTERED:  Zero. CONDITION OF THE PATIENT:  Stable. INDICATION FOR THE PROCEDURE:  The patient is a 49-year-old  admitted to the hospital with an axillary-femoral and femoral-femoral  bypass. Despite adequate inflow, he still has an ischemic right foot. I  discussed with him at length about a femoral-popliteal extension off  this, which he and his family are in agreement in. He has ischemic  skin coloration and pain. DESCRIPTION OF PROCEDURE:  He had an appropriate  preoperative antibiotic. He was brought to the room, where he had  general anesthesia. His leg was prepped and draped in the usual  standard fashion following [facility] compliant guidelines for aseptic  technique. The appropriate limb and patient were identified. An  incision was made on the right leg above-the-knee, medial aspect. It  was carried down to the popliteal artery. The popliteal artery had a  patent lumen just above the knee. Vessel loops were placed here. A  second incision was made at the prior incision site on the groin. I  exposed the axillary-femoral and the femoral-femoral graft. I placed  controls at all sites. I made a subsartorial tunnel and placed an 8 mm  ringed PTFE Beverly-Arnoldo graft. After this, I gave him heparinization. I  placed clamps on the femoral-femoral and the axillary-femoral grafts.   I  made an incision on the hernández, where there were no rings. I  anastomosed this new graft end-to-side to this using CV-6 [place]-Arnoldo  suture in a circular and standard fashion. Upon completion of this, I  clamped the new bypass graft and released all the other controls. There was a good pulse. Attention was turned to the popliteal.  I used  vessel loops for control. I made an arteriotomy. I had good  visualization of the lumen. I cut the graft to the appropriate length in a  beveled fashion and sewed it end-to-side to the artery also using a CV-  6 [place]-Arnoldo suture. Just prior to completion, I burped the graft to  remove air from the system. I completed the anastomosis. I backbled  the sites and then opened up all the controls. There was now an  excellent pulse in the popliteal.  Watching the foot, it was becoming  more pink in color. The sites were irrigated and closed with interrupted 2-0 Monocryl for  the fascia, and 4-0 Monocryl and Dermabond glue for the skin. He  was extubated and transferred to recovery in stable condition.         Bela Sosa DO CM / [address]  D:  04/10/2017   18:58  T:  04/11/2017   05:49  Job #:  [id number]

## 2017-04-11 NOTE — ANESTHESIA POSTPROCEDURE EVALUATION
Post-Anesthesia Evaluation and Assessment    Patient: Jailene Gifford MRN: [id number]  SSN: [id number]    YOB: 1960  Age: 59 y.o. Sex: male       Cardiovascular Function/Vital Signs  Visit Vitals    /81    Pulse (!) 124    Temp 36.8 °C (98.3 °F)    Resp 27    Ht 5' 10\" (1.778 m)    Wt 80.9 kg (178 lb 5.6 oz)    SpO2 100%    BMI 25.59 kg/m2       Patient is status post general anesthesia for Procedure(s): FEMORAL-POPLITEAL BYPASS/WITH GRAFT RIGHT LEG. Nausea/Vomiting: None    Postoperative hydration reviewed and adequate. Pain:  Pain Scale 1: Numeric (0 - 10) (04/11/17 0800)  Pain Intensity 1: 4 (04/11/17 0800)   Managed    Neurological Status:   Neuro (WDL): Within Defined Limits (04/04/17 0727)  Neuro  Neurologic State: Alert; Appropriate for age (04/11/17 0800)  Orientation Level: Oriented X4 (04/11/17 0800)  Cognition: Follows commands; Appropriate decision making; Appropriate for age attention/concentration; Appropriate safety awareness (04/11/17 0800)  Speech: Clear (04/11/17 0800)   At baseline    Mental Status and Level of Consciousness: Alert and oriented     Pulmonary Status:   O2 Device: Room air (04/11/17 0800)   Adequate oxygenation and airway patent    Complications related to anesthesia: None    Post-anesthesia assessment completed.  No concerns    Signed By: Harlan Servin MD     April 11, 2017

## 2017-04-11 NOTE — PROGRESS NOTES
2000 Received report, Nancy Reyna RN. Chart reviewed. Assumed care of patient. Initial assessment completed. S/p Vascular surgery, Phase I perioperative care. Denies pain/discomfort. Family in visiting with patient. 2230 C/o post op LE pain,  8/10 with goal of 6 plan: po Percocet see MAR.     2300 Bedside and Verbal shift change report given to [place] (oncoming nurse) by Roque Adames RN (offgoing nurse). Report included the following information SBAR, Kardex, OR Summary, Procedure Summary, Intake/Output, MAR, Recent Results, Med Rec Status and Cardiac Rhythm Afib.

## 2017-04-11 NOTE — PROGRESS NOTES
RENAL DAILY PROGRESS NOTE      IMPRESSION:   Acute kidney injury, multiple risk factors including  REMI, ? Atherosclerotic emboli, --   Creatinine continue to improve , good urine output  Hyponatremia, improving   Metabolic acidosis corrected   Elevated CK-- improving  Hypomagnesemia   Hypokalemia, mild  Hypocalcemia, with normal phos level, Low ionized calcium   PLAN:   · Creatinine now close to baseline, will sign off , please call us if any question or concern. · Please provide follow up appt in office within 2 week. · If mg < 2, give 2gm iv mg, if K < 3.5 give Potassium oral 20meq. Adjust all meds per current renal function. Discussed with nursing staff. Subjective:     59y M with MAYRA   Complaint:   Overnight events noted  S/p vascular surgery   Hemodynamic stable   no nausea, vomiting, chest pain, short of breath, cough, seizure.      Current Facility-Administered Medications   Medication Dose Route Frequency    metoprolol tartrate (LOPRESSOR) tablet 75 mg  75 mg Oral BID    [START ON 4/12/2017] therapeutic multivitamin (THERAGRAN) tablet 1 Tab  1 Tab Oral DAILY    oxyCODONE-acetaminophen (PERCOCET) 5-325 mg per tablet 2 Tab  2 Tab Oral Q4H PRN    heparin 25,000 units in D5W 250 ml infusion  18-36 Units/kg/hr IntraVENous TITRATE    digoxin (LANOXIN) tablet 0.125 mg  0.125 mg Oral DAILY    dilTIAZem (CARDIZEM) 100 mg in 0.9% sodium chloride (MBP/ADV) 100 mL infusion  5 mg/hr IntraVENous CONTINUOUS    PHENYLephrine 30 mg in 0.9% sodium chloride 250 mL (ANISH-SYNEPHRINE) infusion   mcg/min IntraVENous TITRATE    sodium chloride (NS) flush 5-10 mL  5-10 mL IntraVENous PRN    glucose chewable tablet 16 g  4 Tab Oral PRN    glucagon (GLUCAGEN) injection 1 mg  1 mg IntraMUSCular PRN    dextrose (D50W) injection syrg 12.5-25 g  25-50 mL IntraVENous PRN    fentaNYL citrate (PF) injection 50 mcg  50 mcg IntraVENous Q10MIN PRN    aspirin chewable tablet 81 mg  81 mg Oral DAILY    atorvastatin (LIPITOR) tablet 40 mg  40 mg Oral DAILY    clopidogrel (PLAVIX) tablet 75 mg  75 mg Oral DAILY    cyanocobalamin tablet 1,000 mcg  1,000 mcg Oral DAILY    sodium chloride (NS) flush 5-10 mL  5-10 mL IntraVENous Q8H    sodium chloride (NS) flush 5-10 mL  5-10 mL IntraVENous PRN    morphine injection 2 mg  2 mg IntraVENous Q2H PRN    naloxone (NARCAN) injection 0.4 mg  0.4 mg IntraVENous PRN    ondansetron (ZOFRAN) injection 4 mg  4 mg IntraVENous Q4H PRN    diphenhydrAMINE (BENADRYL) injection 12.5 mg  12.5 mg IntraVENous Q4H PRN    docusate sodium (COLACE) capsule 100 mg  100 mg Oral BID       Review of Symptoms: comprehensive ROS negative except above.    Objective:     Patient Vitals for the past 24 hrs:   Temp Pulse Resp BP SpO2   04/11/17 1600 98.9 °F (37.2 °C) - - - -   04/11/17 1400 - (!) 114 21 108/69 99 %   04/11/17 1112 - (!) 102 24 119/69 100 %   04/11/17 1100 98.3 °F (36.8 °C) - - - -   04/11/17 0900 - (!) 124 27 - 100 %   04/11/17 0816 - (!) 150 30 128/81 (!) 89 %   04/11/17 0800 - (!) 130 23 - 99 %   04/11/17 0700 98.4 °F (36.9 °C) - - - -   04/11/17 0447 97.6 °F (36.4 °C) - - - -   04/11/17 0200 - (!) 122 21 118/74 98 %   04/11/17 0130 - (!) 120 20 121/68 100 %   04/11/17 0100 - (!) 116 19 (!) 86/65 98 %   04/11/17 0030 - (!) 128 23 90/69 96 %   04/11/17 0000 - (!) 126 26 104/63 (!) 70 %   04/10/17 2330 - (!) 114 28 103/85 (!) 79 %   04/10/17 2306 97.7 °F (36.5 °C) - - - -   04/10/17 2300 - (!) 114 24 - 90 %   04/10/17 2230 - (!) 106 22 99/65 96 %   04/10/17 2200 - (!) 104 - 104/77 96 %   04/10/17 2145 - (!) 104 17 104/79 96 %   04/10/17 2130 - (!) 114 (!) 35 100/66 96 %   04/10/17 2115 - (!) 106 22 - 97 %   04/10/17 2100 - (!) 110 20 105/66 99 %   04/10/17 2045 - (!) 116 19 112/59 100 %   04/10/17 2030 - (!) 118 15 105/74 100 %   04/10/17 2015 - (!) 126 19 - 94 %   04/10/17 2000 - (!) 124 21 - 95 %   04/10/17 1945 - (!) 120 23 104/67 100 %   04/10/17 1930 - (!) 114 15 113/67 98 % 04/10/17 1918 97.8 °F (36.6 °C) (!) 120 18 106/90 -   04/10/17 1914 - (!) 123 25 117/62 100 %        Weight change:      04/09 1901 - 04/11 0700  In: 1018 [I.V.:1018]  Out: 3750 [Urine:3750]    Intake/Output Summary (Last 24 hours) at 04/11/17 1658  Last data filed at 04/11/17 1500   Gross per 24 hour   Intake           755.72 ml   Output             1275 ml   Net          -519.28 ml     Physical Exam:   General: comfortable, no acute distress   HEENT sclera anicteric, supple neck, no thyromegaly  CVS: S1S2 heard,  no rub  RS: + air entry b/l,   Abd: Soft, Non tender,   Neuro: non focal, awake, alert ,   Extrm:+  edema, no cyanosis, clubbing   Skin: right lower extremity erythema.            Data Review:     LABS:   Hematology:   Recent Labs      04/11/17   0434  04/10/17   1515  04/09/17   0920   WBC  15.9*  17.5*  15.6*   HGB  7.3*  8.5*  8.9*   HCT  21.0*  24.0*  25.3*     Chemistry:   Recent Labs      04/11/17   0434  04/10/17   0515  04/09/17   0920   BUN  16  22*  24*   CREA  1.00  1.42*  1.56*   CA  8.0*  8.6  8.7   K  3.8  4.0  3.6   NA  135*  130*  132*   CL  99*  94*  93*   CO2  28  27  29   GLU  120*  89  94              Procedures/imaging: see electronic medical records for all procedures, Xrays and details which were not copied into this note but were reviewed prior to creation of Plan          Assessment & Plan:     As above         Marce Cole MD  4/11/2017  12:08 PM

## 2017-04-11 NOTE — PROGRESS NOTES
Vascular Surgery Progress Note    Admit Date: 2017  POD 1 Day Post-Op    Procedure:  Procedure(s): FEMORAL-POPLITEAL BYPASS/WITH GRAFT RIGHT LEG      Subjective:     Patient doing well overall. S/p fem-pop bypass overnight. C/o 8 out of 10 pain and swelling in right leg. Labs noted. Objective:     Blood pressure 118/74, pulse (!) 122, temperature 98.4 °F (36.9 °C), resp. rate 21, height 5' 10\" (1.778 m), weight 178 lb 5.6 oz (80.9 kg), SpO2 98 %. Temp (24hrs), Av.1 °F (36.7 °C), Min:97.6 °F (36.4 °C), Max:98.5 °F (36.9 °C)      Physical Exam:  GENERAL: alert, cooperative, no distress, appears stated age, LUNG:  no resp difficulty, HEART:  irregularly irregular rhythm, ABDOMEN:  ND and EXTREMITIES:  incisions c/d/i. right foot and calf red with moderate swelling. no significant edema left foot. WWP bilaterally. Labs: Results:       Chemistry Recent Labs      04/11/17   0434  04/10/17   0515  17   0920   GLU  120*  89  94   NA  135*  130*  132*   K  3.8  4.0  3.6   CL  99*  94*  93*   CO2  28  27  29   BUN  16  22*  24*   CREA  1.00  1.42*  1.56*   CA  8.0*  8.6  8.7   AGAP  8  9  10   BUCR  16  15  15      CBC w/Diff Recent Labs      17   0434  04/10/17   1515  17   0920   WBC  15.9*  17.5*  15.6*   RBC  2.45*  2.79*  2.96*   HGB  7.3*  8.5*  8.9*   HCT  21.0*  24.0*  25.3*   PLT  380  400  403   GRANS  82*  78*  81*   LYMPH  8*  11*  9*   EOS  1  1  0      Microbiology No results for input(s): CULT in the last 72 hours. Coagulation Recent Labs      17   0434  04/10/17   2330   APTT  111.2*  105.3*         Data Review: images and reports reviewed    Assessment:     Active Problems:     Aortoiliac occlusive disease ([address] (2017)      Atherosclerosis of native artery of both lower extremities with intermittent claudication ([facility] (2017)      PAD (peripheral artery disease) ([facility] (2017)        Plan/Recommendations/Medical Decision Making:     Continue present treatment   Pain control  Monitor neuro/vasc status closely. Anemia of acute blood loss postoperatively- transfuse 2 units  Monitor labs  Leg elevation for swelling.     [facility]  [phone]

## 2017-04-11 NOTE — PROGRESS NOTES
[facility] - Progress Note  Admit Date: 4/4/2017    Assessment:     Hospital Problems  Date Reviewed: 4/4/2017          Codes Class Noted POA    Aortoiliac occlusive disease ([facility] ICD-10-CM: I74.09  ICD-9-CM: 444.09  1/25/2017 Unknown        Atherosclerosis of native artery of both lower extremities with intermittent claudication ([facility] ICD-10-CM: F16.236  ICD-9-CM: 440.21  1/25/2017 Unknown        PAD (peripheral artery disease) ([facility] ICD-10-CM: I73.9  ICD-9-CM: 443.9  1/25/2017 Unknown              - A.fib w/RVR in setting of known persistent atrial fibrillation. Heart rate was elevated as outpatient as well. Asymptomatic.   - PAD, s/p aortoiliac occlusive disease s/p R axillary fem bypass 4/10/17  - Acute Kidney Injury; Cr 4 on 4/5/2017 improved to 1.90 yesterday  - Cardiomyopathy, ischemic. ECHO 2/24/17 EF 35-40% mod. idffuse hypokinesis  - s/p cath 3/15/17 with drug eluding stents to LCx and RCA  - Asymptomatic carotid artery disease  - Essential Hypertension  - Hoarse voice; over the past month  - Weight loss; 20lbs over the past month  - Anemia with H&H continuing to slowly drop down to 23.7 yesterday    Plan:     Remains on diltiazem drip. I will increase lopressor to 75 mg BID today and hopefully to 100 mg BID tomorrow. Wean diltiazem drip.     Subjective:     C/o leg pain post-op    Objective:      Patient Vitals for the past 8 hrs:   Temp Pulse Resp BP SpO2   04/11/17 1100 98.3 °F (36.8 °C) - - - -   04/11/17 0900 - (!) 124 27 - 100 %   04/11/17 0816 - (!) 150 30 128/81 (!) 89 %   04/11/17 0800 - (!) 130 23 - 99 %   04/11/17 0700 98.4 °F (36.9 °C) - - - -   04/11/17 0447 97.6 °F (36.4 °C) - - - -         Patient Vitals for the past 96 hrs:   Weight   04/11/17 0447 80.9 kg (178 lb 5.6 oz)   04/09/17 0500 80.5 kg (177 lb 6.4 oz)                    Intake/Output Summary (Last 24 hours) at 04/11/17 1203  Last data filed at 04/11/17 1100   Gross per 24 hour   Intake           531.72 ml   Output 1850 ml   Net         -1318.28 ml       Physical Exam:  General:  alert, cooperative, no distress, appears stated age  Neck:  nontender  Lungs:  clear to auscultation bilaterally  Heart:  irreg irreg, tachy, S1, S2 normal, no murmur, click, rub or gallop  Abdomen:  abdomen is soft without significant tenderness, masses, organomegaly or guarding  Extremities:  extremities normal, atraumatic, no cyanosis or edema    Data Review:     Labs: Results:       Chemistry Recent Labs      04/11/17   0434  04/10/17   0515  04/09/17   0920   GLU  120*  89  94   NA  135*  130*  132*   K  3.8  4.0  3.6   CL  99*  94*  93*   CO2  28  27  29   BUN  16  22*  24*   CREA  1.00  1.42*  1.56*   CA  8.0*  8.6  8.7   MG   --   1.8  1.6   AGAP  8  9  10   BUCR  16  15  15      CBC w/Diff Recent Labs      04/11/17   0434  04/10/17   1515  04/09/17   0920   WBC  15.9*  17.5*  15.6*   RBC  2.45*  2.79*  2.96*   HGB  7.3*  8.5*  8.9*   HCT  21.0*  24.0*  25.3*   PLT  380  400  403   GRANS  82*  78*  81*   LYMPH  8*  11*  9*   EOS  1  1  0      Cardiac Enzymes No results found for: CPK, CKMMB, CKMB, RCK3, CKMBT, CKNDX, CKND1, LEO, TROPT, TROIQ, DEWAYNE, TROPT, TNIPOC, BNP, BNPP   Coagulation Recent Labs      04/11/17   0434  04/10/17   2330   APTT  111.2*  105.3*       Lipid Panel Lab Results   Component Value Date/Time    Cholesterol, total 157 12/29/2015 07:18 AM    HDL Cholesterol 28 12/29/2015 07:18 AM    LDL, calculated 112.4 12/29/2015 07:18 AM    VLDL, calculated 16.6 12/29/2015 07:18 AM    Triglyceride 83 12/29/2015 07:18 AM    CHOL/HDL Ratio 5.6 12/29/2015 07:18 AM      BNP No results found for: BNP, BNPP, XBNPT   Liver Enzymes No results for input(s): TP, ALB, TBIL, AP, SGOT, GPT in the last 72 hours.     No lab exists for component: DBIL   Digoxin    Thyroid Studies Lab Results   Component Value Date/Time    TSH 4.77 04/06/2017 05:30 AM          Signed By: Ofelia Thomas MD     April 11, 2017

## 2017-04-11 NOTE — ANESTHESIA POSTPROCEDURE EVALUATION
Post-Anesthesia Evaluation and Assessment    Patient: Deena Enamorado MRN: [id number]  SSN: [id number]    YOB: 1960  Age: 59 y.o. Sex: male       Cardiovascular Function/Vital Signs  Visit Vitals    /81    Pulse (!) 124    Temp 36.8 °C (98.3 °F)    Resp 27    Ht 5' 10\" (1.778 m)    Wt 80.9 kg (178 lb 5.6 oz)    SpO2 100%    BMI 25.59 kg/m2       Patient is status post general anesthesia for Procedure(s): FEMORAL-POPLITEAL BYPASS/WITH GRAFT RIGHT LEG. Nausea/Vomiting: None    Postoperative hydration reviewed and adequate. Pain:  Pain Scale 1: Numeric (0 - 10) (04/11/17 1200)  Pain Intensity 1: 4 (04/11/17 0800)   Managed    Neurological Status:   Neuro (WDL): Within Defined Limits (04/04/17 0727)  Neuro  Neurologic State: Alert; Appropriate for age (04/11/17 0800)  Orientation Level: Oriented X4 (04/11/17 0800)  Cognition: Follows commands; Appropriate decision making; Appropriate for age attention/concentration; Appropriate safety awareness (04/11/17 0800)  Speech: Clear (04/11/17 0800)   At baseline    Mental Status and Level of Consciousness: Alert and oriented     Pulmonary Status:   O2 Device: Room air (04/11/17 0800)   Adequate oxygenation and airway patent    Complications related to anesthesia: None    Post-anesthesia assessment completed.  No concerns    Signed By: Kizzy Love CRNA     April 11, 2017

## 2017-04-11 NOTE — PROGRESS NOTES
NUTRITION    BPA/MST Referral       RECOMMENDATIONS / PLAN:     - Add nutritional supplements: Ensure Enlive TID. - Add daily MVI. - Continue RD inpatient monitoring and evaluation. NUTRITION INTERVENTIONS & DIAGNOSIS:     [x] Meals/Snacks: modified diet  [x] Medical food supplementation: initiate     Nutrition Diagnosis: Increased nutrient needs related to wound healing as evidenced by surgical incisions and pressure ulcer. ASSESSMENT:     Subjective/Objective: Good appetite today, consuming most of meals and tolerating diet. Likes and is agreeable to Ensure.     Average po intake adequate to meet patients estimated nutritional needs:   [] Yes     [x] No   [] Unable to determine at this time    Diet: DIET REGULAR      Food Allergies: NKFA  Current Appetite:   [x] Good     [] Fair     [] Poor     [] Other:  Appetite/meal intake prior to admission:   [] Good     [x] Fair     [] Poor     [] Other:  Feeding Limitations:  [] Swallowing difficulty    [] Chewing difficulty    [] Other:  Current Meal Intake:   Patient Vitals for the past 100 hrs:   % Diet Eaten   04/11/17 1200 75 %   04/11/17 0900 80 %   04/09/17 1720 45 %   04/09/17 1243 50 %   04/09/17 1200 75 %   04/09/17 0800 75 %   04/08/17 1746 75 %   04/08/17 1312 50 %       BM:  4/6  Skin Integrity: surgical incision to right axilla, right groin, left groin, right leg; unstageable pressure ulcer to heel   Edema: none  Pertinent Medications: Reviewed: cyanocobalamin     Recent Labs      04/11/17   0434  04/10/17   0515  04/09/17   0920   NA  135*  130*  132*   K  3.8  4.0  3.6   CL  99*  94*  93*   CO2  28  27  29   GLU  120*  89  94   BUN  16  22*  24*   CREA  1.00  1.42*  1.56*   CA  8.0*  8.6  8.7   MG   --   1.8  1.6       Intake/Output Summary (Last 24 hours) at 04/11/17 1344  Last data filed at 04/11/17 1300   Gross per 24 hour   Intake           716.22 ml   Output             1700 ml   Net          -983.78 ml       Anthropometrics:  Ht Readings from Last 1 Encounters:   03/30/17 5' 10\" (1.778 m)     Last 3 Recorded Weights in this Encounter    04/07/17 0615 04/09/17 0500 04/11/17 0447   Weight: 81.6 kg (179 lb 14.4 oz) 80.5 kg (177 lb 6.4 oz) 80.9 kg (178 lb 5.6 oz)     Body mass index is 25.59 kg/(m^2). Weight History: reports weight loss from 190# over the last month. Weight Metrics 4/11/2017 4/4/2017 3/29/2017 3/17/2017 3/16/2017 3/8/2017 2/27/2017   Weight 178 lb 5.6 oz - 170 lb 170 lb 170 lb 9.6 oz 189 lb 179 lb   BMI - 25.59 kg/m2 24.39 kg/m2 24.39 kg/m2 24.48 kg/m2 27.12 kg/m2 25.68 kg/m2        Admitting Diagnosis: Atheroscler of native artery of both legs with intermit claudication ([facility] [I70.213]  Past Medical History:   Diagnosis Date    A-fib ([facility]     Abnormal EKG     CAD S/P percutaneous coronary angioplasty 03/2017    Essential hypertension     Lumbar canal stenosis 05/04/2015    Dr. Kaitlynn Cat PVD (peripheral vascular disease) [facility])        Education Needs:        [x] None identified  [] Identified - Not appropriate at this time  []  Identified and addressed - refer to education log  Learning Limitations:   [x] None identified  [] Identified    Cultural, Confucianist & ethnic food preferences:  [x] None identified    [] Identified and addressed     ESTIMATED NUTRITION NEEDS:     Calories: 9568-0958 kcal (30-35 kcal/kg) based on  [x] Actual BW: 81 kg      [] IBW   Protein: 105-121 gm (1.3-1.5 gm/kg) based on  [x] Actual BW      [] IBW   Fluid: 1 mL/kcal     MONITORING & EVALUATION:     Nutrition Goal(s):   1. Po intake of meals will meet >75% of patient estimated nutritional needs within the next 7 days.   Outcome:  [] Met/Ongoing    [x]  Not Met    [] New/Initial Goal     Monitoring:   [x] Diet tolerance   [x] Meal intake   [x] Supplement intake   [] GI symptoms/ability to tolerate po diet   [] Respiratory status   [] Plan of care      Previous Recommendations (for follow-up assessments only):     [x]   Implemented       []   Not Implemented (RD to address)     [] No Recommendation Made     Discharge Planning: cardiac diet     [x] Participated in care planning, discharge planning, & interdisciplinary rounds as appropriate      Meche Villatoro, [address], [address] [place] [address]    Pager: [phone]

## 2017-04-12 LAB
ABO + RH BLD: NORMAL
APTT PPP: 71.3 SEC (ref 23–36.4)
APTT PPP: 81.1 SEC (ref 23–36.4)
APTT PPP: 88.7 SEC (ref 23–36.4)
APTT PPP: >180 SEC (ref 23–36.4)
BASOPHILS # BLD AUTO: 0 K/UL (ref 0–0.1)
BASOPHILS # BLD: 0 % (ref 0–2)
BLD PROD TYP BPU: NORMAL
BLD PROD TYP BPU: NORMAL
BLOOD GROUP ANTIBODIES SERPL: NORMAL
BPU ID: NORMAL
BPU ID: NORMAL
CALLED TO:,BCALL1: NORMAL
CROSSMATCH RESULT,%XM: NORMAL
CROSSMATCH RESULT,%XM: NORMAL
DIFFERENTIAL METHOD BLD: ABNORMAL
EOSINOPHIL # BLD: 0.1 K/UL (ref 0–0.4)
EOSINOPHIL NFR BLD: 1 % (ref 0–5)
ERYTHROCYTE [DISTWIDTH] IN BLOOD BY AUTOMATED COUNT: 14.9 % (ref 11.6–14.5)
GLUCOSE BLD STRIP.AUTO-MCNC: 113 MG/DL (ref 70–110)
GLUCOSE BLD STRIP.AUTO-MCNC: 117 MG/DL (ref 70–110)
GLUCOSE BLD STRIP.AUTO-MCNC: 94 MG/DL (ref 70–110)
HCT VFR BLD AUTO: 25.8 % (ref 36–48)
HGB BLD-MCNC: 9.2 G/DL (ref 13–16)
LYMPHOCYTES # BLD AUTO: 9 % (ref 21–52)
LYMPHOCYTES # BLD: 1.6 K/UL (ref 0.9–3.6)
MCH RBC QN AUTO: 30.4 PG (ref 24–34)
MCHC RBC AUTO-ENTMCNC: 35.7 G/DL (ref 31–37)
MCV RBC AUTO: 85.1 FL (ref 74–97)
MONOCYTES # BLD: 1.5 K/UL (ref 0.05–1.2)
MONOCYTES NFR BLD AUTO: 9 % (ref 3–10)
NEUTS SEG # BLD: 13.7 K/UL (ref 1.8–8)
NEUTS SEG NFR BLD AUTO: 81 % (ref 40–73)
PLATELET # BLD AUTO: 332 K/UL (ref 135–420)
PMV BLD AUTO: 8.9 FL (ref 9.2–11.8)
RBC # BLD AUTO: 3.03 M/UL (ref 4.7–5.5)
SPECIMEN EXP DATE BLD: NORMAL
STATUS OF UNIT,%ST: NORMAL
STATUS OF UNIT,%ST: NORMAL
UNIT DIVISION, %UDIV: 0
UNIT DIVISION, %UDIV: 0
WBC # BLD AUTO: 16.9 K/UL (ref 4.6–13.2)

## 2017-04-12 PROCEDURE — 74011250637 HC RX REV CODE- 250/637: Performed by: INTERNAL MEDICINE

## 2017-04-12 PROCEDURE — 85730 THROMBOPLASTIN TIME PARTIAL: CPT | Performed by: SURGERY

## 2017-04-12 PROCEDURE — 74011250636 HC RX REV CODE- 250/636: Performed by: SURGERY

## 2017-04-12 PROCEDURE — 74011250637 HC RX REV CODE- 250/637: Performed by: PHYSICIAN ASSISTANT

## 2017-04-12 PROCEDURE — 65660000004 HC RM CVT STEPDOWN

## 2017-04-12 PROCEDURE — 74011000258 HC RX REV CODE- 258: Performed by: INTERNAL MEDICINE

## 2017-04-12 PROCEDURE — 82962 GLUCOSE BLOOD TEST: CPT

## 2017-04-12 PROCEDURE — 85025 COMPLETE CBC W/AUTO DIFF WBC: CPT | Performed by: SURGERY

## 2017-04-12 PROCEDURE — 74011250637 HC RX REV CODE- 250/637: Performed by: SURGERY

## 2017-04-12 PROCEDURE — 74011250636 HC RX REV CODE- 250/636

## 2017-04-12 PROCEDURE — 74011000250 HC RX REV CODE- 250: Performed by: INTERNAL MEDICINE

## 2017-04-12 RX ORDER — HEPARIN SODIUM 1000 [USP'U]/ML
40 INJECTION, SOLUTION INTRAVENOUS; SUBCUTANEOUS ONCE
Status: COMPLETED | OUTPATIENT
Start: 2017-04-12 | End: 2017-04-12

## 2017-04-12 RX ORDER — DIGOXIN 250 MCG
0.25 TABLET ORAL DAILY
Status: DISCONTINUED | OUTPATIENT
Start: 2017-04-13 | End: 2017-04-16

## 2017-04-12 RX ORDER — DIGOXIN 125 MCG
0.12 TABLET ORAL ONCE
Status: COMPLETED | OUTPATIENT
Start: 2017-04-12 | End: 2017-04-12

## 2017-04-12 RX ORDER — METOPROLOL TARTRATE 25 MG/1
25 TABLET, FILM COATED ORAL ONCE
Status: COMPLETED | OUTPATIENT
Start: 2017-04-12 | End: 2017-04-12

## 2017-04-12 RX ORDER — METOPROLOL TARTRATE 50 MG/1
100 TABLET ORAL 2 TIMES DAILY
Status: DISCONTINUED | OUTPATIENT
Start: 2017-04-12 | End: 2017-04-13

## 2017-04-12 RX ORDER — HEPARIN SODIUM 1000 [USP'U]/ML
INJECTION, SOLUTION INTRAVENOUS; SUBCUTANEOUS
Status: COMPLETED
Start: 2017-04-12 | End: 2017-04-12

## 2017-04-12 RX ADMIN — METOPROLOL TARTRATE 25 MG: 25 TABLET ORAL at 10:41

## 2017-04-12 RX ADMIN — OXYCODONE HYDROCHLORIDE AND ACETAMINOPHEN 2 TABLET: 5; 325 TABLET ORAL at 17:49

## 2017-04-12 RX ADMIN — DOCUSATE SODIUM 100 MG: 100 CAPSULE, LIQUID FILLED ORAL at 17:49

## 2017-04-12 RX ADMIN — OXYCODONE HYDROCHLORIDE AND ACETAMINOPHEN 2 TABLET: 5; 325 TABLET ORAL at 23:31

## 2017-04-12 RX ADMIN — Medication 10 ML: at 17:54

## 2017-04-12 RX ADMIN — SODIUM CHLORIDE 10 MG/HR: 900 INJECTION, SOLUTION INTRAVENOUS at 07:28

## 2017-04-12 RX ADMIN — HEPARIN SODIUM: 1000 INJECTION, SOLUTION INTRAVENOUS; SUBCUTANEOUS at 17:57

## 2017-04-12 RX ADMIN — DOCUSATE SODIUM 100 MG: 100 CAPSULE, LIQUID FILLED ORAL at 08:10

## 2017-04-12 RX ADMIN — OXYCODONE HYDROCHLORIDE AND ACETAMINOPHEN 2 TABLET: 5; 325 TABLET ORAL at 04:19

## 2017-04-12 RX ADMIN — DIGOXIN 0.12 MG: 0.12 TABLET ORAL at 08:09

## 2017-04-12 RX ADMIN — CLOPIDOGREL 75 MG: 75 TABLET, FILM COATED ORAL at 08:10

## 2017-04-12 RX ADMIN — HEPARIN SODIUM AND DEXTROSE 11 UNITS/KG/HR: 10000; 5 INJECTION INTRAVENOUS at 17:48

## 2017-04-12 RX ADMIN — ASPIRIN 81 MG CHEWABLE TABLET 81 MG: 81 TABLET CHEWABLE at 08:11

## 2017-04-12 RX ADMIN — METOPROLOL TARTRATE 100 MG: 50 TABLET ORAL at 20:24

## 2017-04-12 RX ADMIN — THERA TABS 1 TABLET: TAB at 08:10

## 2017-04-12 RX ADMIN — DIGOXIN 0.12 MG: 0.12 TABLET ORAL at 12:30

## 2017-04-12 RX ADMIN — OXYCODONE HYDROCHLORIDE AND ACETAMINOPHEN 2 TABLET: 5; 325 TABLET ORAL at 10:40

## 2017-04-12 RX ADMIN — SODIUM CHLORIDE 5 MG/HR: 900 INJECTION, SOLUTION INTRAVENOUS at 23:34

## 2017-04-12 RX ADMIN — ATORVASTATIN CALCIUM 40 MG: 40 TABLET, FILM COATED ORAL at 08:11

## 2017-04-12 RX ADMIN — Medication 10 ML: at 06:39

## 2017-04-12 RX ADMIN — Medication 1000 MCG: at 08:09

## 2017-04-12 RX ADMIN — METOPROLOL TARTRATE 75 MG: 50 TABLET ORAL at 08:10

## 2017-04-12 NOTE — PROGRESS NOTES
[facility]  -  Progress Note      Patient: Anson King MRN: [id number]  SSN: [id number]    YOB: 1960  Age: 59 y.o. Sex: male      Admit Date: 4/4/2017    Hospital Problem List:     Hospital Problems  Date Reviewed: 4/4/2017          Codes Class Noted POA    Aortoiliac occlusive disease ([facility] ICD-10-CM: I74.09  ICD-9-CM: 444.09  1/25/2017 Unknown        Atherosclerosis of native artery of both lower extremities with intermittent claudication ([facility] ICD-10-CM: N01.146  ICD-9-CM: 440.21  1/25/2017 Unknown        PAD (peripheral artery disease) ([facility] ICD-10-CM: I73.9  ICD-9-CM: 443.9  1/25/2017 Unknown            - A.fib w/RVR in setting of known persistent atrial fibrillation. Heart rate was elevated as outpatient as well. Asymptomatic. Rate controlled on digoxin and BB.  - PAD, s/p aortoiliac occlusive disease s/p R axillary fem bypass 4/10/17  - Acute Kidney Injury; Cr 4 on 4/5/2017 improved to 1.90 yesterday  - Cardiomyopathy, ischemic. ECHO 2/24/17 EF 35-40% mod. idffuse hypokinesis  - s/p cath 3/15/17 with drug eluding stents to LCx and RCA  - Asymptomatic carotid artery disease  - Essential Hypertension  - Hoarse voice; over the past month  - Weight loss; 20lbs over the past month  - Anemia, post-operatively, s/p 2 units PRBC, Hgb improved 9.2     Plan:     -Off cardizem gtt.  -Continued on digoxin and BB PO.  -Will increase BB for better rate control (remains in 120's, stable BP). -Restarted on Plavix and ASA. -Continue routine post-op care. -Continue heparin gtt per Vasc surg recs. Subjective:     Doing well. Up in recliner. Appetite is good. Urinating without issue. Good sensation in both LEs.      Objective:      Patient Vitals for the past 8 hrs:   Temp Pulse Resp BP SpO2   04/12/17 0700 97.8 °F (36.6 °C) - - - -   04/12/17 0310 98.3 °F (36.8 °C) - - - -   04/12/17 0200 - (!) 108 27 125/80 96 %         Patient Vitals for the past 96 hrs:   Weight 04/12/17 0310 84.1 kg (185 lb 6.5 oz)   04/11/17 0447 80.9 kg (178 lb 5.6 oz)   04/09/17 0500 80.5 kg (177 lb 6.4 oz)         Intake/Output Summary (Last 24 hours) at 04/12/17 0852  Last data filed at 04/12/17 0700   Gross per 24 hour   Intake          1831.43 ml   Output             1100 ml   Net           731.43 ml       Physical Exam:  General:  Awake, alert, oriented x 3  Neck:  Supple, no jvd  Lungs:  Clear to auscultation bilat  Heart:  irreg rate and rhythm, no murmur  Abdomen:  Soft, non-tender  Extremities: R > L LE edema, RLE is warm, erythema to foot, good sensation bilaterally    Data Review:     Labs: Results:       Chemistry Recent Labs      04/11/17   0434  04/10/17   0515  04/09/17   0920   GLU  120*  89  94   NA  135*  130*  132*   K  3.8  4.0  3.6   CL  99*  94*  93*   CO2  28  27  29   BUN  16  22*  24*   CREA  1.00  1.42*  1.56*   CA  8.0*  8.6  8.7   MG   --   1.8  1.6   AGAP  8  9  10   BUCR  16  15  15      CBC w/Diff Recent Labs      04/12/17   0520  04/11/17   0434  04/10/17   1515   WBC  16.9*  15.9*  17.5*   RBC  3.03*  2.45*  2.79*   HGB  9.2*  7.3*  8.5*   HCT  25.8*  21.0*  24.0*   PLT  332  380  400   GRANS  81*  82*  78*   LYMPH  9*  8*  11*   EOS  1  1  1      Cardiac Enzymes No results found for: CPK, CKMMB, CKMB, RCK3, CKMBT, CKNDX, CKND1, LEO, TROPT, TROIQ, DEWAYNE, TROPT, TNIPOC, BNP, BNPP   Coagulation Recent Labs      04/12/17   0520  04/11/17   2350   APTT  81.1*  88.7*       Lipid Panel Lab Results   Component Value Date/Time    Cholesterol, total 157 12/29/2015 07:18 AM    HDL Cholesterol 28 12/29/2015 07:18 AM    LDL, calculated 112.4 12/29/2015 07:18 AM    VLDL, calculated 16.6 12/29/2015 07:18 AM    Triglyceride 83 12/29/2015 07:18 AM    CHOL/HDL Ratio 5.6 12/29/2015 07:18 AM      BNP No results found for: BNP, BNPP, XBNPT   Liver Enzymes No results for input(s): TP, ALB, TBIL, AP, SGOT, GPT in the last 72 hours.     No lab exists for component: DBIL   Digoxin    Thyroid Studies Lab Results   Component Value Date/Time    TSH 4.77 04/06/2017 05:30 AM            Signed By: JUNE Madrid     April 12, 2017

## 2017-04-12 NOTE — PROGRESS NOTES
Bedside and Verbal shift change report given to Estefany Mir RN  (oncoming nurse) by Vinetta Lesches (offgoing nurse). Report included the following information SBAR, Kardex, MAR and Recent Results.     Vinetta Lesches

## 2017-04-12 NOTE — PROGRESS NOTES
Admit Date: 2017    POD 2 Days Post-Op    Procedure:  Procedure(s): FEMORAL-POPLITEAL BYPASS/WITH GRAFT RIGHT LEG    Subjective:     Patient has no new complaints. In good spirits  OOB to chair this morning  Had difficulty bearing weight and pivoting on right foot. Feels like he will have a difficult time being able to walk on it     Objective:     Blood pressure 125/80, pulse (!) 108, temperature 97.8 °F (36.6 °C), resp. rate 27, height 5' 10\" (1.778 m), weight 185 lb 6.5 oz (84.1 kg), SpO2 96 %. Temp (24hrs), Av.4 °F (36.9 °C), Min:97.8 °F (36.6 °C), Max:98.9 °F (37.2 °C)      Physical Exam:    A/O x 3, NAD  Right leg incisions c/d/i  Improved doppler signals right foot as well as improved color and warmth  Some reperfusion edema noted and blistering near inner ankle. Necrotic eschar of heel and lateral ankle    Labs:   Recent Results (from the past 24 hour(s))   TYPE & CROSSMATCH    Collection Time: 17 11:40 AM   Result Value Ref Range    Crossmatch Expiration 2017     ABO/Rh(D) O POSITIVE     Antibody screen NEG     CALLED TO: CVT O WOOD ON 2017 AT 1327 BY MARII/Itzel9.      Unit number [id number]     Blood component type  LR AS1     Unit division 00     Status of unit TRANSFUSED     Crossmatch result Compatible     Unit number [id number]     Blood component type  LR AS1     Unit division 00     Status of unit TRANSFUSED     Crossmatch result Compatible    PTT    Collection Time: 17 11:40 AM   Result Value Ref Range    aPTT 66.6 (H) 23.0 - 36.4 SEC   GLUCOSE, POC    Collection Time: 17 11:47 AM   Result Value Ref Range    Glucose (POC) 118 (H) 70 - 110 mg/dL   GLUCOSE, POC    Collection Time: 17  4:44 PM   Result Value Ref Range    Glucose (POC) 147 (H) 70 - 110 mg/dL   PTT    Collection Time: 17 11:50 PM   Result Value Ref Range    aPTT 88.7 (H) 23.0 - 36.4 SEC   PTT    Collection Time: 17  5:20 AM   Result Value Ref Range    aPTT 81.1 (H) 23.0 - 36.4 SEC   CBC WITH AUTOMATED DIFF    Collection Time: 04/12/17  5:20 AM   Result Value Ref Range    WBC 16.9 (H) 4.6 - 13.2 K/uL    RBC 3.03 (L) 4.70 - 5.50 M/uL    HGB 9.2 (L) 13.0 - 16.0 g/dL    HCT 25.8 (L) 36.0 - 48.0 %    MCV 85.1 74.0 - 97.0 FL    MCH 30.4 24.0 - 34.0 PG    MCHC 35.7 31.0 - 37.0 g/dL    RDW 14.9 (H) 11.6 - 14.5 %    PLATELET 177 200 - 655 K/uL    MPV 8.9 (L) 9.2 - 11.8 FL    NEUTROPHILS 81 (H) 40 - 73 %    LYMPHOCYTES 9 (L) 21 - 52 %    MONOCYTES 9 3 - 10 %    EOSINOPHILS 1 0 - 5 %    BASOPHILS 0 0 - 2 %    ABS. NEUTROPHILS 13.7 (H) 1.8 - 8.0 K/UL    ABS. LYMPHOCYTES 1.6 0.9 - 3.6 K/UL    ABS. MONOCYTES 1.5 (H) 0.05 - 1.2 K/UL    ABS. EOSINOPHILS 0.1 0.0 - 0.4 K/UL    ABS. BASOPHILS 0.0 0.0 - 0.1 K/UL    DF AUTOMATED     GLUCOSE, POC    Collection Time: 04/12/17  7:37 AM   Result Value Ref Range    Glucose (POC) 117 (H) 70 - 110 mg/dL         Assessment:     Active Problems: Aortoiliac occlusive disease ([facility] (1/25/2017)      Atherosclerosis of native artery of both lower extremities with intermittent claudication ([address] (1/25/2017)      PAD (peripheral artery disease) ([facility] (1/25/2017)        Plan/Recommendations/Medical Decision Making:     PT/OT and case management eval  Transfer to stepdown. Defer cardizem management to cardiology  Renal has signed off due to improved renal function  Likely has reperfusion edema. Encouraged elevation. Will ask wound care for a tubi  compression. TEDs may be too tight  Explained we will hope for demarcation of necrotic areas of right heel/lateral ankle.  Wound care and close observation

## 2017-04-13 LAB
APTT PPP: 69.8 SEC (ref 23–36.4)
APTT PPP: 78.4 SEC (ref 23–36.4)
BASOPHILS # BLD AUTO: 0 K/UL (ref 0–0.06)
BASOPHILS # BLD: 0 % (ref 0–3)
DIFFERENTIAL METHOD BLD: ABNORMAL
EOSINOPHIL # BLD: 0.2 K/UL (ref 0–0.4)
EOSINOPHIL NFR BLD: 1 % (ref 0–5)
ERYTHROCYTE [DISTWIDTH] IN BLOOD BY AUTOMATED COUNT: 15.1 % (ref 11.6–14.5)
HCT VFR BLD AUTO: 29.1 % (ref 36–48)
HGB BLD-MCNC: 10.1 G/DL (ref 13–16)
INR PPP: 1.1 (ref 0.8–1.2)
LYMPHOCYTES # BLD AUTO: 13 % (ref 20–51)
LYMPHOCYTES # BLD: 2.2 K/UL (ref 0.8–3.5)
MCH RBC QN AUTO: 29.8 PG (ref 24–34)
MCHC RBC AUTO-ENTMCNC: 34.7 G/DL (ref 31–37)
MCV RBC AUTO: 85.8 FL (ref 74–97)
MONOCYTES # BLD: 0.5 K/UL (ref 0–1)
MONOCYTES NFR BLD AUTO: 3 % (ref 2–9)
NEUTS SEG # BLD: 14 K/UL (ref 1.8–8)
NEUTS SEG NFR BLD AUTO: 83 % (ref 42–75)
PLATELET # BLD AUTO: 365 K/UL (ref 135–420)
PLATELET COMMENTS,PCOM: ABNORMAL
PMV BLD AUTO: 9.2 FL (ref 9.2–11.8)
PROTHROMBIN TIME: 14.2 SEC (ref 11.5–15.2)
RBC # BLD AUTO: 3.39 M/UL (ref 4.7–5.5)
RBC MORPH BLD: ABNORMAL
WBC # BLD AUTO: 16.9 K/UL (ref 4.6–13.2)

## 2017-04-13 PROCEDURE — 74011250636 HC RX REV CODE- 250/636: Performed by: SURGERY

## 2017-04-13 PROCEDURE — 74011250637 HC RX REV CODE- 250/637: Performed by: INTERNAL MEDICINE

## 2017-04-13 PROCEDURE — 97165 OT EVAL LOW COMPLEX 30 MIN: CPT

## 2017-04-13 PROCEDURE — 85610 PROTHROMBIN TIME: CPT | Performed by: SURGERY

## 2017-04-13 PROCEDURE — 74011250637 HC RX REV CODE- 250/637: Performed by: SURGERY

## 2017-04-13 PROCEDURE — 74011000258 HC RX REV CODE- 258: Performed by: INTERNAL MEDICINE

## 2017-04-13 PROCEDURE — 74011250637 HC RX REV CODE- 250/637: Performed by: PHYSICIAN ASSISTANT

## 2017-04-13 PROCEDURE — 85730 THROMBOPLASTIN TIME PARTIAL: CPT | Performed by: SURGERY

## 2017-04-13 PROCEDURE — 85025 COMPLETE CBC W/AUTO DIFF WBC: CPT | Performed by: SURGERY

## 2017-04-13 PROCEDURE — 74011000250 HC RX REV CODE- 250: Performed by: INTERNAL MEDICINE

## 2017-04-13 PROCEDURE — 97162 PT EVAL MOD COMPLEX 30 MIN: CPT

## 2017-04-13 PROCEDURE — 74011250636 HC RX REV CODE- 250/636: Performed by: PHYSICIAN ASSISTANT

## 2017-04-13 PROCEDURE — 65660000004 HC RM CVT STEPDOWN

## 2017-04-13 PROCEDURE — 36415 COLL VENOUS BLD VENIPUNCTURE: CPT | Performed by: SURGERY

## 2017-04-13 PROCEDURE — 97535 SELF CARE MNGMENT TRAINING: CPT

## 2017-04-13 PROCEDURE — 97116 GAIT TRAINING THERAPY: CPT

## 2017-04-13 RX ORDER — ENOXAPARIN SODIUM 100 MG/ML
80 INJECTION SUBCUTANEOUS EVERY 12 HOURS
Status: DISCONTINUED | OUTPATIENT
Start: 2017-04-13 | End: 2017-04-20

## 2017-04-13 RX ORDER — HEPARIN SODIUM 1000 [USP'U]/ML
40 INJECTION, SOLUTION INTRAVENOUS; SUBCUTANEOUS ONCE
Status: COMPLETED | OUTPATIENT
Start: 2017-04-13 | End: 2017-04-13

## 2017-04-13 RX ORDER — METOPROLOL TARTRATE 50 MG/1
50 TABLET ORAL EVERY 6 HOURS
Status: DISCONTINUED | OUTPATIENT
Start: 2017-04-13 | End: 2017-04-16

## 2017-04-13 RX ORDER — ENOXAPARIN SODIUM 100 MG/ML
80 INJECTION SUBCUTANEOUS EVERY 24 HOURS
Status: DISCONTINUED | OUTPATIENT
Start: 2017-04-13 | End: 2017-04-13

## 2017-04-13 RX ORDER — WARFARIN SODIUM 5 MG/1
5 TABLET ORAL ONCE
Status: COMPLETED | OUTPATIENT
Start: 2017-04-13 | End: 2017-04-13

## 2017-04-13 RX ADMIN — ENOXAPARIN SODIUM 80 MG: 80 INJECTION, SOLUTION INTRAVENOUS; SUBCUTANEOUS at 11:01

## 2017-04-13 RX ADMIN — DOCUSATE SODIUM 100 MG: 100 CAPSULE, LIQUID FILLED ORAL at 08:12

## 2017-04-13 RX ADMIN — OXYCODONE HYDROCHLORIDE AND ACETAMINOPHEN 2 TABLET: 5; 325 TABLET ORAL at 10:57

## 2017-04-13 RX ADMIN — METOPROLOL TARTRATE 100 MG: 50 TABLET ORAL at 08:13

## 2017-04-13 RX ADMIN — OXYCODONE HYDROCHLORIDE AND ACETAMINOPHEN 2 TABLET: 5; 325 TABLET ORAL at 16:03

## 2017-04-13 RX ADMIN — CLOPIDOGREL 75 MG: 75 TABLET, FILM COATED ORAL at 08:13

## 2017-04-13 RX ADMIN — THERA TABS 1 TABLET: TAB at 08:13

## 2017-04-13 RX ADMIN — SODIUM CHLORIDE 5 MG/HR: 900 INJECTION, SOLUTION INTRAVENOUS at 18:49

## 2017-04-13 RX ADMIN — METOPROLOL TARTRATE 50 MG: 50 TABLET ORAL at 18:09

## 2017-04-13 RX ADMIN — Medication 1000 MCG: at 08:13

## 2017-04-13 RX ADMIN — WARFARIN SODIUM 5 MG: 5 TABLET ORAL at 18:09

## 2017-04-13 RX ADMIN — DIGOXIN 0.25 MG: 0.25 TABLET ORAL at 08:12

## 2017-04-13 RX ADMIN — METOPROLOL TARTRATE 50 MG: 50 TABLET ORAL at 12:37

## 2017-04-13 RX ADMIN — ASPIRIN 81 MG CHEWABLE TABLET 81 MG: 81 TABLET CHEWABLE at 08:12

## 2017-04-13 RX ADMIN — Medication 10 ML: at 06:51

## 2017-04-13 RX ADMIN — Medication 10 ML: at 15:31

## 2017-04-13 RX ADMIN — DOCUSATE SODIUM 100 MG: 100 CAPSULE, LIQUID FILLED ORAL at 18:09

## 2017-04-13 RX ADMIN — OXYCODONE HYDROCHLORIDE AND ACETAMINOPHEN 2 TABLET: 5; 325 TABLET ORAL at 03:37

## 2017-04-13 RX ADMIN — Medication 2 MG: at 22:05

## 2017-04-13 RX ADMIN — ATORVASTATIN CALCIUM 40 MG: 40 TABLET, FILM COATED ORAL at 08:12

## 2017-04-13 RX ADMIN — HEPARIN SODIUM 3220 UNITS: 1000 INJECTION, SOLUTION INTRAVENOUS; SUBCUTANEOUS at 04:53

## 2017-04-13 RX ADMIN — Medication 2 MG: at 15:28

## 2017-04-13 RX ADMIN — Medication 10 ML: at 23:41

## 2017-04-13 RX ADMIN — OXYCODONE HYDROCHLORIDE AND ACETAMINOPHEN 2 TABLET: 5; 325 TABLET ORAL at 20:37

## 2017-04-13 RX ADMIN — HEPARIN SODIUM AND DEXTROSE 10 UNITS/KG/HR: 10000; 5 INJECTION INTRAVENOUS at 04:56

## 2017-04-13 NOTE — PROGRESS NOTES
Pt continues to feel better  Seen at bedside with Dr Latia Fox  Noticeable reperfusion edema right foot. Awaiting wound care for tubi  compression sleeve and recommendations for some wound care. Will also get podiatry eval  [facility]    Is currently on heparin gtt and plavix  Will confer with cardiology regarding his a fib and anticoagulation. Would prefer a NOAC and aspirin and second preference would be coumadin and plavix.  Will follow up with orders    Clarified with cardiology  Preference is coumadin  Will start that, continue aspirin  Will go ahead and stop heparin gtt though and Dr Latia Fox advised lovenox bridge

## 2017-04-13 NOTE — PROGRESS NOTES
Problem: Self Care Deficits Care Plan (Adult)  Goal: *Acute Goals and Plan of Care (Insert Text)  Occupational Therapy Goals  Initiated 4/13/2017 within 7 day(s). 1. Patient will perform grooming with supervision/set-up   2. Patient will perform upper body dressing with supervision/set-up. 3. Patient will perform lower body dressing with supervision/set-up with AE. 4. Patient will perform toilet transfers with supervision/set-up. 5. Patient will perform all aspects of toileting with supervision/set-up. Outcome: Progressing Towards Goal  OCCUPATIONAL THERAPY EVALUATION     Patient: Richelle Mak (11 y.o. male)  Date: 4/13/2017  Primary Diagnosis: Atheroscler of native artery of both legs with intermit claudication ([facility] [address] [I70.213]  Procedure(s) (LRB):  FEMORAL-POPLITEAL BYPASS/WITH GRAFT RIGHT LEG (Right) 3 Days Post-Op   Precautions:   Fall (need clarification on ROM/WB status of RUE/shoulder)      ASSESSMENT :  Based on the objective data described below, the patient needed total assistance to jasmeet socks seated in chair, secondary to bilateral groin incisions. Deferred R shoulder ROM secondary to axillary incision (will clarify with vascular ROM and WB status of RUE). Patient has WFL AROM of LUE and  strength. Patient needed max A during standing to simulate toilet transfer with rolling walker (only WB through LUE). Patient noted to have tremors in [id number] [facility] [address], patient reported this is his baseline; nurse notified. Patient left in chair, comfortable in no distress. Patient will benefit from skilled intervention to address the above impairments.   Patients rehabilitation potential is considered to be Good  Factors which may influence rehabilitation potential include:   [ ]             None noted  [ ]             Mental ability/status  [X]             Medical condition  [ ]             Home/family situation and support systems  [ ]             Safety awareness  [ ]             Pain tolerance/management  [ ] Other: PLAN :  Recommendations and Planned Interventions:  [X]               Self Care Training                  [X]        Therapeutic Activities  [X]               Functional Mobility Training    [ ]        Cognitive Retraining  [X]               Therapeutic Exercises           [X]        Endurance Activities  [X]               Balance Training                   [ ]        Neuromuscular Re-Education  [ ]               Visual/Perceptual Training     [X]   Home Safety Training  [X]               Patient Education                 [ ]        Family Training/Education  [ ]               Other (comment):     Frequency/Duration: Patient will be followed by occupational therapy 1-2 times per day/4-7 days per week to address goals. Discharge Recommendations: [facility]  Further Equipment Recommendations for Discharge: TBD       PATIENT COMPLEXITY      Eval Complexity: History: MEDIUM Complexity : Expanded review of history including physical, cognitive and psychosocial  history ; Examination: MEDIUM Complexity : 3-5 performance deficits relating to physical, cognitive , or psychosocial skils that result in activity limitations and / or participation restrictions; Decision Making:MEDIUM Complexity : Patient may present with comorbidities that affect occupational performnce. Miniml to moderate modification of tasks or assistance (eg, physical or verbal ) with assesment(s) is necessary to enable patient to complete evaluation  Assessment: Moderate Complexity       G-CODES:      Self Care  Current  CK= 40-59%   Goal  CI= 1-19%. The severity rating is based on the Level of Assistance required for Functional Mobility and ADLs. SUBJECTIVE:   Patient stated I understand.       OBJECTIVE DATA SUMMARY:       Past Medical History:   Diagnosis Date    A-fib ([address]      Abnormal EKG      CAD S/P percutaneous coronary angioplasty 03/2017    Essential hypertension      Lumbar canal stenosis 05/04/2015     Dr. Toney Byrd PVD (peripheral vascular disease) [facility])       Past Surgical History:   Procedure Laterality Date    CARDIAC CATHETERIZATION   3/8/2017          CARDIAC CATHETERIZATION   3/15/2017          CORONARY ARTERY ANGIOGRAM   3/8/2017          CORONARY STENT EA ADDL VESSEL   3/15/2017          CORONARY STENT SINGLE W/PTCA   3/15/2017          HX COLONOSCOPY   07/23/2015     polyps repeat 2020 5 years Mary Ann Henderson    LV ANGIOGRAPHY   3/8/2017           Prior Level of Function/Home Situation: Patient reported he was independent in basic self care tasks and functional mobility PTA. Home Situation  Home Environment: Trailer/mobile home  # Steps to Enter: 3  Rails to Enter: Yes  Hand Rails : Bilateral  One/Two Story Residence: One story  Living Alone: Yes  Support Systems: Child(mile), Family member(s)  Patient Expects to be Discharged to[de-identified] Trailer/mobile home  Current DME Used/Available at Home: None  Tub or Shower Type: Tub/Shower combination  [X]  Right hand dominant          [ ]  Left hand dominant  Cognitive/Behavioral Status:  Neurologic State: Alert  Orientation Level: Oriented X4  Cognition: Follows commands  Safety/Judgement: Fall prevention     Skin: No skin changes noted     Edema: No edema noted     Vision/Perceptual:    Acuity: Within Defined Limits       Coordination:  Coordination: Within functional limits (LUE, RUE not assessed)       Balance:  Sitting: Intact  Standing: Impaired; With support (with rolling walker/only WB on LUE)  Standing - Static: Fair  Standing - Dynamic : Fair     Strength:  Strength: Generally decreased, functional (LUE, RUE not assessed)     Tone & Sensation:  Tone: Normal (BUEs)  Sensation: Intact (BUEs)     Range of Motion:  AROM: Within functional limits (LUE, RUE not assessed)     Functional Mobility and Transfers for ADLs:  Bed Mobility:  Rolling:  (N/A-pt up in chair upon arrival)   Scooting: Contact guard assistance  Transfers:  Sit to Stand: Maximum assistance;Assist x2     ADL Assessment:(clincial judgement based on functional mobility)  Feeding: Independent     Oral Facial Hygiene/Grooming: Moderate assistance     Bathing: Maximum assistance     Upper Body Dressing: Moderate assistance     Lower Body Dressing: Maximum assistance     Toileting: Maximum assistance     Pain:  Pt reports pain in R thigh, but does not rate with a number      Activity Tolerance:  Fair/poor     Please refer to the flowsheet for vital signs taken during this treatment. After treatment:   [X] Patient left in no apparent distress sitting up in chair  [ ] Patient left in no apparent distress in bed  [X] Call bell left within reach  [X] Nursing notified  [X] Caregiver present  [ ] Bed alarm activated      COMMUNICATION/EDUCATION:   [ ] Home safety education was provided and the patient/caregiver indicated understanding. [X] Patient/family have participated as able in goal setting and plan of care. [X] Patient/family agree to work toward stated goals and plan of care. [ ] Patient understands intent and goals of therapy, but is neutral about his/her participation. [ ] Patient is unable to participate in goal setting and plan of care.      Thank you for this referral.  Megan Adam, OTR/L  Time Calculation: 28 mins

## 2017-04-13 NOTE — PROGRESS NOTES
Problem: Mobility Impaired (Adult and Pediatric)  Goal: *Acute Goals and Plan of Care (Insert Text)  STGs to be addressed within 3 days:  1. Bed mobility: Supine to sit to supine min/CGA with HR for meals. 2. Activity tolerance: Tolerate up in chair 1-2 hrs for ADLs. 3. Transfers: Sit to stand to chair mod/min A with LRAD for ADLs. LTGs to be addressed within 7 days:  1. Standing/Ambulation Balance: Increase to Good with LRAD for safe transfers and gait. 2. Ambulation: Ambulate > 100 ft. CGA with LRAD for home mobility. 3. Transfers: Sit to stand min/CGA with LRAD for ADLs  4. Stairs: Up/Down 2 steps CGA with HR for home entry. Outcome: Progressing Towards Goal  PHYSICAL THERAPY EVALUATION     Patient: Mari Mata (25 y.o. male)  Date: 4/13/2017  Primary Diagnosis: Atheroscler of native artery of both legs [facility]aud[facility] [I70.213]  Procedure(s) (LRB):  FEMORAL-POPLITEAL BYPASS/WITH GRAFT RIGHT LEG (Right) 3 Days Post-Op   Precautions:   Fall      ASSESSMENT :  Based on the objective data described below, the patient presents s/p R tojgkb-epzjfck-gmhtxwvzx bypass with graft with decreased functional mobility with regard to bed mobility, transfers, gait, and overall tolerance for activity. Patient with R axilla incision, covered with steri-strips, groin incisions intact with dermabond. Patient received sitting up in chair upon arrival.  Patient with noted shaking at rest, however, patient reports that this is his norm. Will need clarification on WB restrictions on R UE due to axilla incision, initiated instruction with WB through L UE for ambulation. Patient required max A x 2 with RW for attempting forward/backward step, significantly limited due to pain in R LE. Patient would benefit from PT to address above impairments and assist with discharge planning. Patient will benefit from skilled intervention to address the above impairments.   Patients rehabilitation potential is considered to be Good  Factors which may influence rehabilitation potential include:   [ ]         None noted  [ ]         Mental ability/status  [ ]         Medical condition  [ ]         Home/family situation and support systems  [ ]         Safety awareness  [X]         Pain tolerance/management  [ ]         Other:        PLAN :  Recommendations and Planned Interventions:  [X]           Bed Mobility Training             [X]    Neuromuscular Re-Education  [X]           Transfer Training                   [ ]    Orthotic/Prosthetic Training  [X]           Gait Training                          [ ]    Modalities  [X]           Therapeutic Exercises          [ ]    Edema Management/Control  [X]           Therapeutic Activities            [X]    Patient and Family Training/Education  [ ]           Other (comment):     Frequency/Duration: Patient will be followed by physical therapy daily x 4-7 x week to address goals. Discharge Recommendations: To Be Determined  Further Equipment Recommendations for Discharge: To be determined       SUBJECTIVE:   Patient stated Wava Hallmark will try something.       OBJECTIVE DATA SUMMARY:       Past Medical History:   Diagnosis Date    A-fib ([facility] [address]      Abnormal EKG      CAD S/P percutaneous coronary angioplasty 03/2017    Essential hypertension      Lumbar canal stenosis 05/04/2015     Dr. Gerber Bates PVD (peripheral vascular disease) [facility])       Past Surgical History:   Procedure Laterality Date    CARDIAC CATHETERIZATION   3/8/2017          CARDIAC CATHETERIZATION   3/15/2017          CORONARY ARTERY ANGIOGRAM   3/8/2017          CORONARY STENT EA ADDL VESSEL   3/15/2017          CORONARY STENT SINGLE W/PTCA   3/15/2017          HX COLONOSCOPY   07/23/2015     polyps repeat 2020 5 years Faustino Henderson    LV ANGIOGRAPHY   3/8/2017           Barriers to Learning/Limitations: None  Compensate with: N/A  Prior Level of Function/Home Situation:   Home Situation  Home Environment: Trailer/mobile home  # Steps to Enter: 3  Rails to Enter: Yes  Hand Rails : Bilateral  One/Two Story Residence: One story  Living Alone: Yes  Support Systems: Child(mile), Family member(s)  Patient Expects to be Discharged to[de-identified] Trailer/mobile home  Current DME Used/Available at Home: None  Tub or Shower Type: Tub/Shower combination  Critical Behavior:  Neurologic State: Alert  Psychosocial  Patient Behaviors: Talkative; Restless; Cooperative  Family  Behaviors: Calm; Cooperative;Supportive  Strength:    Strength: Generally decreased, functional (R LE 2/5, L LE 5/5)  Tone & Sensation:   Tone: Normal (B LE)  Sensation: Impaired (B LE noted paresthesias)   Range Of Motion:  AROM: Generally decreased, functional (R LE limited due to surgery)  Functional Mobility:  Bed Mobility:  Rolling:  (N/A-pt up in chair upon arrival)  Scooting: Contact guard assistance (scooting forward in the chair)  Transfers:  Sit to Stand: Maximum assistance;Assist x2; Additional time (with RW)  Stand to Sit: Moderate assistance;Minimum assistance;Assist x2; Additional time (with RW)  Balance:   Sitting: Intact  Standing: Impaired;Pull to stand; With support (with RW)  Standing - Static: Fair;Constant support (with RW)  Standing - Dynamic : Fair (with RW)  Ambulation/Gait Training:  Distance (ft):  (1 step forward/backward)  Assistive Device: Walker, rolling  Ambulation - Level of Assistance: Maximum assistance; Additional time  Base of Support: Narrowed; Shift to left  Speed/Suzanna: Pace decreased (<100 feet/min); Slow  Interventions: Visual/Demos; Verbal cues  Pain:  Pain Scale 1: Numeric (0 - 10)  Pain Intensity 1: 9  Pain Location 1: Leg;Foot  Pain Orientation 1: Right  Pain Description 1: Burning;Constant; Laverle Mandes; Shooting; Throbbing  Pain Intervention(s) 1: Nurse notified  Activity Tolerance:   Good  Please refer to the flowsheet for vital signs taken during this treatment.   After treatment:   [X] Patient left in no apparent distress sitting up in chair  [ ] Patient left sitting on EOB  [ ] Patient left in no apparent distress in bed  [ ] Patient declined to be OOB at this time due to   [X] Call bell left within reach  [X] Nursing notified(Cherrie, RN)  [X] Caregiver present  [ ] Bed alarm activated      COMMUNICATION/EDUCATION:   [X]         Fall prevention education was provided and the patient/caregiver indicated understanding. [X]         Patient/family have participated as able in goal setting and plan of care. [X]         Patient/family agree to work toward stated goals and plan of care. [ ]         Patient understands intent and goals of therapy, but is neutral about his/her participation. [ ]         Patient is unable to participate in goal setting and plan of care. Thank you for this referral.  Elin Christy, PT   Time Calculation: 24 mins      G-codes:  Mobility  Current  CM= 80-99%   Goal  CJ= 20-39%. The severity rating is based on the Level of Assistance required for Functional Mobility and ADLs.      Eval Complexity: History: MEDIUM  Complexity : 1-2 comorbidities / personal factors will impact the outcome/ POC Exam:MEDIUM Complexity : 3 Standardized tests and measures addressing body structure, function, activity limitation and / or participation in recreation  Presentation: MEDIUM Complexity : Evolving with changing characteristics  Overall Complexity:MEDIUM

## 2017-04-13 NOTE — PROGRESS NOTES
[facility]  -  Progress Note      Patient: Mariella Chan MRN: [id number]  SSN: [id number]    YOB: 1960  Age: 59 y.o. Sex: male      Admit Date: 4/4/2017     The patient was seen, examined, and independently evaluated and I agree with the below assessment and plan by Hardy Collado PA-C with the following comments. He continues on Lopressor 100 mg twice daily, Digoxin increased to 0.25 mg daily yesterday, and Cardizem drip at 5 mg daily and still with heart rates still 100 to 150. Complaining of a lot of pain in right leg and with perfusion edema of right leg, but cellulitis as well and with leukocytosis and left shift would consider antibiotic therapy per the primary service. Will change beta blocker to 50 mg every 6 hours to attempt to get better control of heart rate a throughout the day without dropping blood pressure to far. since we have adequate blood pressure to see if we can slow rate without dropping blood pressure to far. Creatinine back to baseline at 1.0 today. Hospital Problem List:     Hospital Problems  Date Reviewed: 4/4/2017          Codes Class Noted POA    Aortoiliac occlusive disease ([facility] ICD-10-CM: I74.09  ICD-9-CM: 444.09  1/25/2017 Unknown        Atherosclerosis of native artery of both lower extremities with intermittent claudication ([facility] ICD-10-CM: U35.579  ICD-9-CM: 440.21  1/25/2017 Unknown        PAD (peripheral artery disease) ([facility] ICD-10-CM: I73.9  ICD-9-CM: 443.9  1/25/2017 Unknown            - A.fib w/RVR in setting of known persistent atrial fibrillation. Heart rate was elevated as outpatient as well. Asymptomatic. Rate controlling on digoxin and BB. Adjusting doses. Continued on low dose cardizem gtt today. - PAD, s/p aortoiliac occlusive disease s/p R axillary fem bypass 4/10/17  - Acute Kidney Injury; Cr 4 on 4/5/2017 improved to 1.90 yesterday  - Cardiomyopathy, ischemic. ECHO 2/24/17 EF 35-40% mod.  idffuse hypokinesis  - s/p cath 3/15/17 with drug eluding stents to LCx and RCA  - Asymptomatic carotid artery disease  - Essential Hypertension  - Hoarse voice; over the past month  - Weight loss; 20lbs over the past month  - Anemia, post-operatively, s/p 2 units PRBC, Hgb improved 9.2    Plan:     -Continue weaning cardizem gtt. -Give morning dose of PO rate controlling meds. -Expect to be able to stop cardizem gtt before noon today.   -Continue ASA and Plavix.   -Discussed [facility] with both teams. Would prefer patient to start on coumadin therapy. Subjective:     Up in recliner. Comfortable at rest. Denies chest pain, palpitations, sob, or banda.      Objective:      Patient Vitals for the past 8 hrs:   Temp Pulse Resp BP SpO2   04/13/17 0901 - (!) 134 23 108/69 -   04/13/17 0812 - (!) 128 - (!) 142/94 -   04/13/17 0801 - (!) 144 29 (!) 142/94 100 %   04/13/17 0737 97.4 °F (36.3 °C) - - - -   04/13/17 0701 - (!) 118 20 117/72 100 %   04/13/17 0601 - (!) 110 23 119/72 99 %   04/13/17 0401 - (!) 106 22 138/83 100 %   04/13/17 0301 - (!) 102 25 116/79 100 %         Patient Vitals for the past 96 hrs:   Weight   04/13/17 0650 82.5 kg (181 lb 12.8 oz)   04/12/17 0310 84.1 kg (185 lb 6.5 oz)   04/11/17 0447 80.9 kg (178 lb 5.6 oz)         Intake/Output Summary (Last 24 hours) at 04/13/17 0949  Last data filed at 04/13/17 0700   Gross per 24 hour   Intake           374.74 ml   Output             1150 ml   Net          -775.26 ml       Physical Exam:  General:  Awake, alert, oriented x 3, up in recliner  Neck:  Supple, no jvd  Lungs:  Clear to auscultation bilat  Heart:  Tachy, irregular rate and rhythm  Abdomen: soft, non-tender  Extremities: RLE 2+ edema up to knee, pedal erythema with blistering to medial aspect of ankle, LLE no edema, erythema or cyanosis    Data Review:     Labs: Results:       Chemistry Recent Labs      04/11/17   0434   GLU  120*   NA  135*   K  3.8   CL  99*   CO2  28   BUN  16   CREA  1.00   CA  8.0*   AGAP  8   BUCR  16 CBC w/Diff Recent Labs      04/13/17   0307  04/12/17   0520  04/11/17   0434   WBC  16.9*  16.9*  15.9*   RBC  3.39*  3.03*  2.45*   HGB  10.1*  9.2*  7.3*   HCT  29.1*  25.8*  21.0*   PLT  365  332  380   GRANS  83*  81*  82*   LYMPH  13*  9*  8*   EOS  1  1  1      Cardiac Enzymes No results found for: CPK, CKMMB, CKMB, RCK3, CKMBT, CKNDX, CKND1, LEO, TROPT, TROIQ, DEWAYNE, TROPT, TNIPOC, BNP, BNPP   Coagulation Recent Labs      04/13/17   0307  04/12/17   1817   APTT  69.8*  >180.0*       Lipid Panel Lab Results   Component Value Date/Time    Cholesterol, total 157 12/29/2015 07:18 AM    HDL Cholesterol 28 12/29/2015 07:18 AM    LDL, calculated 112.4 12/29/2015 07:18 AM    VLDL, calculated 16.6 12/29/2015 07:18 AM    Triglyceride 83 12/29/2015 07:18 AM    CHOL/HDL Ratio 5.6 12/29/2015 07:18 AM      BNP No results found for: BNP, BNPP, XBNPT   Liver Enzymes No results for input(s): TP, ALB, TBIL, AP, SGOT, GPT in the last 72 hours.     No lab exists for component: DBIL   Digoxin    Thyroid Studies Lab Results   Component Value Date/Time    TSH 4.77 04/06/2017 05:30 AM            Signed By: Stewart Mccarthy DO     April 13, 2017

## 2017-04-13 NOTE — WOUND CARE
Physical Exam   Musculoskeletal:        Legs:  9424: Patient seen by wound care per order for tubi  light compression. Size G applied from mpj to popliteal space. Several vascular-type ulcers noted, eschar, POA. Blistering noted to dorsal foot.  Nursing to monitor, care turned over to nursing,  Jeanne Tilley, RN

## 2017-04-14 LAB
INR PPP: 1.3 (ref 0.8–1.2)
PROTHROMBIN TIME: 15.4 SEC (ref 11.5–15.2)

## 2017-04-14 PROCEDURE — 65660000004 HC RM CVT STEPDOWN

## 2017-04-14 PROCEDURE — 74011250636 HC RX REV CODE- 250/636: Performed by: PHYSICIAN ASSISTANT

## 2017-04-14 PROCEDURE — 74011250637 HC RX REV CODE- 250/637: Performed by: PHYSICIAN ASSISTANT

## 2017-04-14 PROCEDURE — 77030011256 HC DRSG MEPILEX <16IN NO BORD MOLN -A

## 2017-04-14 PROCEDURE — 93971 EXTREMITY STUDY: CPT

## 2017-04-14 PROCEDURE — 74011250637 HC RX REV CODE- 250/637: Performed by: INTERNAL MEDICINE

## 2017-04-14 PROCEDURE — 74011250636 HC RX REV CODE- 250/636: Performed by: SURGERY

## 2017-04-14 PROCEDURE — 74011250637 HC RX REV CODE- 250/637: Performed by: SURGERY

## 2017-04-14 PROCEDURE — 36415 COLL VENOUS BLD VENIPUNCTURE: CPT | Performed by: PHYSICIAN ASSISTANT

## 2017-04-14 PROCEDURE — 97530 THERAPEUTIC ACTIVITIES: CPT

## 2017-04-14 PROCEDURE — 85610 PROTHROMBIN TIME: CPT | Performed by: PHYSICIAN ASSISTANT

## 2017-04-14 RX ORDER — WARFARIN SODIUM 5 MG/1
5 TABLET ORAL ONCE
Status: COMPLETED | OUTPATIENT
Start: 2017-04-14 | End: 2017-04-14

## 2017-04-14 RX ORDER — DILTIAZEM HYDROCHLORIDE 240 MG/1
240 CAPSULE, COATED, EXTENDED RELEASE ORAL DAILY
Status: DISCONTINUED | OUTPATIENT
Start: 2017-04-14 | End: 2017-04-20

## 2017-04-14 RX ADMIN — Medication 10 ML: at 12:49

## 2017-04-14 RX ADMIN — ATORVASTATIN CALCIUM 40 MG: 40 TABLET, FILM COATED ORAL at 08:29

## 2017-04-14 RX ADMIN — METOPROLOL TARTRATE 50 MG: 50 TABLET ORAL at 17:43

## 2017-04-14 RX ADMIN — THERA TABS 1 TABLET: TAB at 08:29

## 2017-04-14 RX ADMIN — CLOPIDOGREL 75 MG: 75 TABLET, FILM COATED ORAL at 08:29

## 2017-04-14 RX ADMIN — METOPROLOL TARTRATE 50 MG: 50 TABLET ORAL at 00:41

## 2017-04-14 RX ADMIN — METOPROLOL TARTRATE 50 MG: 50 TABLET ORAL at 12:49

## 2017-04-14 RX ADMIN — METOPROLOL TARTRATE 50 MG: 50 TABLET ORAL at 06:25

## 2017-04-14 RX ADMIN — OXYCODONE HYDROCHLORIDE AND ACETAMINOPHEN 2 TABLET: 5; 325 TABLET ORAL at 03:31

## 2017-04-14 RX ADMIN — Medication 10 ML: at 22:08

## 2017-04-14 RX ADMIN — ENOXAPARIN SODIUM 80 MG: 80 INJECTION, SOLUTION INTRAVENOUS; SUBCUTANEOUS at 12:48

## 2017-04-14 RX ADMIN — DIGOXIN 0.25 MG: 0.25 TABLET ORAL at 08:28

## 2017-04-14 RX ADMIN — DILTIAZEM HYDROCHLORIDE 240 MG: 240 CAPSULE, COATED, EXTENDED RELEASE ORAL at 12:48

## 2017-04-14 RX ADMIN — OXYCODONE HYDROCHLORIDE AND ACETAMINOPHEN 2 TABLET: 5; 325 TABLET ORAL at 08:29

## 2017-04-14 RX ADMIN — WARFARIN SODIUM 5 MG: 5 TABLET ORAL at 17:43

## 2017-04-14 RX ADMIN — OXYCODONE HYDROCHLORIDE AND ACETAMINOPHEN 2 TABLET: 5; 325 TABLET ORAL at 12:53

## 2017-04-14 RX ADMIN — Medication 10 ML: at 22:00

## 2017-04-14 RX ADMIN — Medication 10 ML: at 06:26

## 2017-04-14 RX ADMIN — ENOXAPARIN SODIUM 80 MG: 80 INJECTION, SOLUTION INTRAVENOUS; SUBCUTANEOUS at 00:42

## 2017-04-14 RX ADMIN — ENOXAPARIN SODIUM 80 MG: 80 INJECTION, SOLUTION INTRAVENOUS; SUBCUTANEOUS at 22:03

## 2017-04-14 RX ADMIN — DOCUSATE SODIUM 100 MG: 100 CAPSULE, LIQUID FILLED ORAL at 08:28

## 2017-04-14 RX ADMIN — DOCUSATE SODIUM 100 MG: 100 CAPSULE, LIQUID FILLED ORAL at 17:43

## 2017-04-14 RX ADMIN — Medication 2 MG: at 02:45

## 2017-04-14 RX ADMIN — Medication 1000 MCG: at 08:28

## 2017-04-14 RX ADMIN — DIPHENHYDRAMINE HYDROCHLORIDE 12.5 MG: 50 INJECTION, SOLUTION INTRAMUSCULAR; INTRAVENOUS at 21:50

## 2017-04-14 RX ADMIN — OXYCODONE HYDROCHLORIDE AND ACETAMINOPHEN 2 TABLET: 5; 325 TABLET ORAL at 18:28

## 2017-04-14 NOTE — PROGRESS NOTES
Pt really agitated and c/o more severe pain and burning right foot this morning  Also impressive is increased leg edema. Had the lower leg reperfusion edema but entire leg more edematous today. Also bruising posterior knee area.    Increased blistering likely from the edema  Betadine per podiatry  Encouraged use of pain meds and will get PVL to r/o dvt  Plan discussed with dr Talya Camp  Pending results will try to treat symptoms with light compression wrap  Had done lovenox instead of heparin as a bridge to coumadin, which will continue for now    Followed up with patient this afternoon with dr Chong Locus  Duplex was negative for dvt  Continue lovenox/coumadin  Pt did seem to have improved comfort this afternoon  Unable to get new tubi  and would prefer no ace wrap  Encourage elevation

## 2017-04-14 NOTE — PROCEDURES
[facility]  *** [facility]AL REPORT ***    Name: Claudia Madden  MRN: [id number]    Inpatient  : 22 Mar 1960  HIS Order #: [id number]  [address] [facility] Visit #: [id number]  Date: 2017    TYPE OF TEST: Peripheral Venous Testing    REASON FOR TEST  Pain in limb, Edema    Right Leg:-  Deep venous thrombosis:           No  Superficial venous thrombosis:    No  Deep venous insufficiency:        Not examined  Superficial venous insufficiency: Not examined      INTERPRETATION/FINDINGS  Duplex images were obtained using 2-D gray scale, color flow, and  spectral Doppler analysis. Right leg :  1. No evidence of deep venous thrombosis detected in the veins  visualized. 2. Deep vein(s) visualized include the common femoral, femoral,  popliteal, posterior tibial, and peroneal veins. 3. No evidence of superficial thrombosis detected. 4. Superficial vein(s) visualized include the great saphenous vein at  the sapheno-femoral junction. 5. No evidence of deep vein thrombosis in the contralateral common  femoral vein. ADDITIONAL COMMENTS  Suboptimal imaging due to significant swelling. I have personally reviewed the data relevant to the interpretation of  this  study. TECHNOLOGIST: Ashley Rivas ALEJA  Signed: 2017 09:31 AM    PHYSICIAN: Dhaval Pierre D.O.   Signed: 2017 09:38 AM

## 2017-04-14 NOTE — PROGRESS NOTES
Talked with JUNE Caceres from vascular. Cleared patient for WBAT through RUE and AROM as tolerated, no restrictions for mobility for groin incisions. Thank you, will follow up.     Jake Loco OTR/L

## 2017-04-14 NOTE — ROUTINE PROCESS
Bedside and Verbal shift change report given to Mikey Renner (oncoming nurse) by Cole Nicolas RN (offgoing nurse). Report included the following information SBAR, Kardex, MAR and Recent Results. SITUATION:    Code Status: Full Code   Reason for Admission: Atheroscler of native artery of both legs with intermit claudication ([facility] [address] day: 10   Problem List:       Hospital Problems  Date Reviewed: 4/4/2017          Codes Class Noted POA    Aortoiliac occlusive disease ([facility] ICD-10-CM: I74.09  ICD-9-CM: 444.09  1/25/2017 Unknown        Atherosclerosis of native artery of both lower extremities with intermittent claudication ([facility] ICD-10-CM: C98.956  ICD-9-CM: 440.21  1/25/2017 Unknown        [facility]peripheral artery [facility] ICD-10-CM: I73.9  ICD-9-CM: 443.9  1/25/2017 Unknown              BACKGROUND:    Past Medical History:   Past Medical History:   Diagnosis Date    A-fib ([facility]     Abnormal EKG     CAD S/P percutaneous coronary angioplasty 03/2017    Essential hypertension     Lumbar canal stenosis 05/04/2015    Dr. Génesis Zuleta PVD (peripheral vascular disease) [facility])          Patient taking anticoagulants yes     ASSESSMENT:    Changes in Assessment Throughout Shift: increased pain and swelling in legs     Patient has Central Line: no Reasons if yes: /   Patient has Jiang Cath: no Reasons if yes: .       Last Vitals:     Vitals:    04/14/17 0000 04/14/17 0403 04/14/17 0530 04/14/17 0757   BP: 119/70 156/76  111/72   Pulse: (!) 129 (!) 103  (!) 123   Resp: 18 18  20   Temp: 98.6 °F (37 °C) 98.5 °F (36.9 °C)  98.4 °F (36.9 °C)   SpO2: 97% 96%  96%   Weight:   84.9 kg (187 lb 3.6 oz)    Height:            IV and DRAINS (will only show if present)   [REMOVED] Peripheral IV 04/04/17 Left Antecubital-Site Assessment: Clean, dry, & intact  [REMOVED] Peripheral IV 04/04/17 Right Antecubital-Site Assessment: Clean, dry, & intact  [REMOVED] Arterial Line 04/04/17 Left Radial artery-Site Assessment: Clean, dry, & intact  [REMOVED] Peripheral IV 04/10/17 Left Antecubital-Site Assessment: Clean, dry, & intact  Peripheral IV 04/10/17 Right Antecubital-Site Assessment: Clean, dry, & intact  Peripheral IV 04/11/17 Left Hand-Site Assessment: Clean, dry, & intact     WOUND (if present)   Wound Type:  Right calf and foot ,   Dressing present : yes   Wound Concerns/Notes:  Poor circulation to extremity     PAIN    Pain Assessment    Pain Intensity 1: 3 (04/14/17 0430)    Pain Location 1: Foot    Pain Intervention(s) 1: Refused (refusing pain medication. states \"I'm alright\")    Patient Stated Pain Goal: 0  o Interventions for Pain:  Percocet  o Intervention effective: yes  o Time of last intervention: 0331   o Reassessment Completed: yes      Last 3 Weights:  Last 3 Recorded Weights in this Encounter    04/12/17 0310 04/13/17 0650 04/14/17 0530   Weight: 84.1 kg (185 lb 6.5 oz) 82.5 kg (181 lb 12.8 oz) 84.9 kg (187 lb 3.6 oz)     Weight change: 2.461 kg (5 lb 6.8 oz)     INTAKE/OUPUT    Current Shift:      Last three shifts: 04/12 1901 - 04/14 0700  In: 748.8 [P.O.:480; I.V.:268.8]  Out: 1125 [Urine:1125]     LAB RESULTS     Recent Labs      04/13/17   0307  04/12/17   0520   WBC  16.9*  16.9*   HGB  10.1*  9.2*   HCT  29.1*  25.8*   PLT  365  332        Recent Labs      04/14/17   0550  04/13/17   1040   INR  1.3*  1.1       RECOMMENDATIONS AND DISCHARGE PLANNING     1. Pending tests/procedures/ Plan of Care or Other Needs: dupplex of lower extremity     2. Discharge plan for patient and Needs/Barriers: home    3. Estimated Discharge Date: 4/17/2017 Posted on Whiteboard in Patients Room: no      4. The patient's care plan was reviewed with the oncoming nurse.        \"HEALS\" SAFETY CHECK      Fall Risk    Total Score: 2    Safety Measures: Safety Measures: Bed/Chair-Wheels locked, Call light within reach    A safety check occurred in the patient's room between off going nurse and oncoming nurse listed above. The safety check included the below items  Area Items   H  High Alert Medications - Verify all high alert medication drips (heparin, PCA, etc.)   E  Equipment - Suction is set up for ALL patients (with cailin)  - Red plugs utilized for all equipment (IV pumps, etc.)  - WOWs wiped down at end of shift.  - Room stocked with oxygen, suction, and other unit-specific supplies   A  Alarms - Bed alarm is set for fall risk patients  - Ensure chair alarm is in place and activated if patient is up in a chair   L  Lines - Check IV for any infiltration  - Jiang bag is empty if patient has a Jiang   - Tubing and IV bags are labeled   S  Safety   - Room is clean, patient is clean, and equipment is clean. - Hallways are clear from equipment besides carts. - Fall bracelet on for fall risk patients  - Ensure room is clear and free of clutter  - Suction is set up for ALL patients (with cailin)  - Hallways are clear from equipment besides carts.    - Isolation precautions followed, supplies available outside room, sign posted     Autumn Vazquez RN

## 2017-04-14 NOTE — PROGRESS NOTES
Care Management Interventions  PCP Verified by CM: Yes (as listed)  Palliative Care Consult (Criteria: CHF and RRAT>21): No  Reason for No Palliative Care Consult: Other (see comment)  Mode of Transport at Discharge: Self (pt's family will provide transport at [employer])  Transition of Care Consult (CM Consult): Discharge Planning  MyChart Signup: No  Discharge Durable Medical Equipment: No  Health Maintenance Reviewed: Yes  Physical Therapy Consult: Yes  Occupational Therapy Consult: Yes  Speech Therapy Consult: No  Current Support Network: Lives Alone  Confirm Follow Up Transport: Self  Plan discussed with Pt/Family/Caregiver: Yes  Discharge Location  Discharge Placement:  (tbd)    Patient 59years old male admitted to cvt sd with aortic ooc. Saw the patient alone in room, he is alert & oriented x 4, open to conversation. Patient shares he lives alone, his brother lives across the street, his sister lives locally also, and his daughter lives in [place]. Patient does not have any medical equipment at home for use, but he states he might need something after the dc. Patient is open to dcp options recommended. I informed the patient that PT recs-rehab, OT-snf. I made referral to ARU. I also provided patient with snf list, he stated [facility] as his facility of choice if ARU will not be able to accommodate. Will continue to follow and to assist in dcp needs.

## 2017-04-14 NOTE — ROUTINE PROCESS
0041- lovenox SQ and lopressor PO given per MD order. Patient instructed to call if he had to get up. Patient sitting in chair next to bed. Patient instructed to elevate leg. Patient elevates leg for a few minutes and then drops it back to the floor. Patient refusing to sleep in the bed because he reports that the bed moves. Bed unplugged per patient request.   0100- Patient complaining of pain, but refusing pain medication. Patient lying down in bed with head facing foot of bed and legs elevated. 0245- Morphine 2 mg given IV push for pain to right foot. 0310-patient found sitting on bedside commode, which had the collection container fall to the floor. Patient assisted back to bed.  0315- Patient sitting in chair with tremors. Patient reporting that he has tremors at home. Patient reports pain, but is refusing percocet. 9596- Patient agreed to take percocet. Percocet 2 tabs given PO for pain. 0430- patient reports that the pain is better. Rates pain a 2/10/  0530- patient monitor off, lying on the bed. . Patient trying to walk to the restroom. Patient refusing to use a bedside commode or a bedpan. Patient reports that he does not have to go now. Patient reports that he does not want to wear the heart monitor. Placed heart monitor back on patient. Patient agrees to wear it now. Patient denies pain at present time. 0630- patient sitting in chair. Patient moaning and rocking head forward. Patient refusing pain medication at this time. Patient offered morphine IV . Patient instructed to place leg up on chair to help with swelling. Patient refusing to elevate leg at this time.

## 2017-04-14 NOTE — PROGRESS NOTES
[facility]  -  Progress Note      Patient: Betty Ramirez MRN: [id number]  SSN: [id number]    YOB: 1960  Age: 59 y.o. Sex: male      Admit Date: 4/4/2017    Assessment:     - A.fib. Persistent for past 2 months, HR 150s as outpatient and asymptomatic, HR still 100-120s on current regimen including diltiazem infusion at 10 mg/hr    - PAD, s/p aortoiliac occlusive disease s/p R axillary fem bypass and fem-pop 4/10/17. Now on Plavix, Lovenox/warfarin  - Acute Kidney Injury. Resolved, Cr back to baseline  - Cardiomyopathy, ischemic. ECHO 2/24/17 EF 35-40% mod.  diffuse hypokinesis, no evidence of ADHF  - s/p cath 3/15/17 with drug eluding stents to LCx and RCA  - Asymptomatic carotid artery disease  - Essential Hypertension  - Anemia, post-operatively, s/p 2 units PRBC, Hgb improved 9.2    Plan:     -- will start po Cardizem CD with plan to decrease/stop IV Cardizem over next 1-2 days  -- warfarin/Lovenox for anti-coagulation  If patient remains in rapid AFib over the weekend, will consider BERRY/Cardioversion on Monday    Subjective:     Still with right LE pain and swelling    Objective:      Patient Vitals for the past 8 hrs:   Temp Pulse Resp BP SpO2   04/14/17 0757 98.4 °F (36.9 °C) (!) 123 20 111/72 96 %   04/14/17 0403 98.5 °F (36.9 °C) (!) 103 18 156/76 96 %         Patient Vitals for the past 96 hrs:   Weight   04/14/17 0530 84.9 kg (187 lb 3.6 oz)   04/13/17 0650 82.5 kg (181 lb 12.8 oz)   04/12/17 0310 84.1 kg (185 lb 6.5 oz)   04/11/17 0447 80.9 kg (178 lb 5.6 oz)         Intake/Output Summary (Last 24 hours) at 04/14/17 1024  Last data filed at 04/14/17 0608   Gross per 24 hour   Intake           613.92 ml   Output              450 ml   Net           163.92 ml       Physical Exam:  General:  alert, cooperative, mild distress, appears older than stated age  Neck:  no JVD  Lungs:  clear to auscultation bilaterally  Heart:  irregularly irregular rhythm  Abdomen:  abdomen is soft without significant tenderness, masses, organomegaly or guarding  Extremities:  Right leg mildly edematous with erythema, warm, left leg without swelling    Data Review:     Labs: Results:       Chemistry No results for input(s): GLU, NA, K, CL, CO2, BUN, CREA, CA, MG, PHOS, AGAP, BUCR, TBIL, GPT, AP, TP, ALB, GLOB, AGRAT in the last 72 hours. CBC w/Diff Recent Labs      04/13/17   0307  04/12/17   0520   WBC  16.9*  16.9*   RBC  3.39*  3.03*   HGB  10.1*  9.2*   HCT  29.1*  25.8*   PLT  365  332   GRANS  83*  81*   LYMPH  13*  9*   EOS  1  1      Cardiac Enzymes No results found for: CPK, CKMMB, CKMB, RCK3, CKMBT, CKNDX, CKND1, LEO, TROPT, TROIQ, DEWAYNE, TROPT, TNIPOC, BNP, BNPP   Coagulation Recent Labs      04/14/17   0550  04/13/17   1040  04/13/17   0307   PTP  15.4*  14.2   --    INR  1.3*  1.1   --    APTT   --   78.4*  69.8*       Lipid Panel Lab Results   Component Value Date/Time    Cholesterol, total 157 12/29/2015 07:18 AM    HDL Cholesterol 28 12/29/2015 07:18 AM    LDL, calculated 112.4 12/29/2015 07:18 AM    VLDL, calculated 16.6 12/29/2015 07:18 AM    Triglyceride 83 12/29/2015 07:18 AM    CHOL/HDL Ratio 5.6 12/29/2015 07:18 AM      BNP No results found for: BNP, BNPP, XBNPT   Liver Enzymes No results for input(s): TP, ALB, TBIL, AP, SGOT, GPT in the last 72 hours.     No lab exists for component: DBIL   Digoxin    Thyroid Studies Lab Results   Component Value Date/Time    TSH 4.77 04/06/2017 05:30 AM

## 2017-04-14 NOTE — ROUTINE PROCESS
Bedside shift change report given to Jeanine Curry (oncoming nurse) by Gabriela Apple RN (offgoing nurse). Report included the following information SBAR, Kardex, Intake/Output, MAR and Recent Results.

## 2017-04-14 NOTE — ROUTINE PROCESS
Bedside shift change report given to [address] (oncoming nurse) by Luís Bazan (offgoing nurse). Report given with SBAR, Kardex, Intake/Output, MAR and Recent Results.

## 2017-04-14 NOTE — CONSULTS
FOOT & ANKLE CONSULTATION      Patient: Stanley Domingo   Age:  59 y.o. MRN: [id number]    Date of Admission: 4/4/2017  Admit Dx / CC: Atheroscler of native artery of both legs with intermit claudication [facility]) [I70.213]     Date of Consultation: April 14, 2017     Referring Physician: Dr. David Dyson    Reason for Consultation: Right foot ulcer    -------------------------------------------------------------------------------------------------------------------  HISTORY:  Patient is a 59 y.o.  male who is being seen for evaluation and management of the above. Pt relates no prior h/o ulcerations until recent episode. Pt denies DM and relates no h/o any foot concerns until recent leg pain/ischemia.     Patient Active Problem List    Diagnosis Date Noted    CAD (coronary artery disease) 03/15/2017    [facility] 01/25/2017    Atherosclerosis of native artery of both lower extremities with intermittent claudication [facility] 01/25/2017    PAD (peripheral artery disease) ([facility] 01/25/2017    Spinal stenosis, lumbar 12/13/2016    Aortic valve stenosis 12/13/2016    Hereditary and idiopathic neuropathy, unspecified 12/13/2016    Radiculopathy, lumbar region 12/13/2016    Lumbar neuritis 11/15/2016    Spinal stenosis, lumbar region, without neurogenic claudication 11/15/2016    Hereditary peripheral neuropathy 11/15/2016    Erectile dysfunction 07/05/2016    Hypertension 01/26/2015    Vitamin B12 deficiency 01/26/2015    Low back pain 01/26/2015    DDD (degenerative disc disease), lumbar 01/26/2015       PAST MEDICAL HISTORY:  Past Medical History:   Diagnosis Date    A-fib [facility])     Abnormal EKG     CAD S/P percutaneous coronary angioplasty 03/2017    Essential hypertension     Lumbar canal stenosis 05/04/2015    Dr. Toney Byrd PVD (peripheral vascular disease) [facility])         PAST SURGICAL HISTORY:  Past Surgical History:   Procedure Laterality Date    CARDIAC CATHETERIZATION 3/8/2017         CARDIAC CATHETERIZATION  3/15/2017         CORONARY ARTERY ANGIOGRAM  3/8/2017         CORONARY STENT EA ADDL VESSEL  3/15/2017         CORONARY STENT SINGLE W/PTCA  3/15/2017         HX COLONOSCOPY  07/23/2015    polyps repeat 2020 5 years Jose Eduardo Henderson    LV ANGIOGRAPHY  3/8/2017             SOCIAL HISTORY:  Social History   Substance Use Topics    Smoking status: Former Smoker    Smokeless tobacco: Never Used      Comment: quit in 2011    Alcohol use 1.2 oz/week     2 Cans of beer per week      Comment: 10 cans of beer a month       FAMILY HISTORY:  Family History   Problem Relation Age of Onset    Heart Disease Father         CURRENT MEDICATIONS:  Current Facility-Administered Medications   Medication Dose Route Frequency Provider Last Rate Last Dose    WARFARIN INFORMATION NOTE (COUMADIN)   Other Q24H JUNE Franco        metoprolol tartrate (LOPRESSOR) tablet 50 mg  50 mg Oral Q6H Redd Smart DO   50 mg at 04/14/17 0625    enoxaparin (LOVENOX) injection 80 mg  80 mg SubCUTAneous Q12H JUNE Franco   80 mg at 04/14/17 0042    digoxin (LANOXIN) tablet 0.25 mg  0.25 mg Oral DAILY Early May, MD   0.25 mg at 04/13/17 8752    therapeutic multivitamin [facility]) tablet 1 Tab  1 Tab Oral DAILY Pelon Millan   1 Tab at 04/13/17 0813    oxyCODONE-acetaminophen (PERCOCET) 5-325 mg per tablet 2 Tab  2 Tab Oral Q4H PRN Sandy Javier DO   2 Tab at 04/14/17 0331    dilTIAZem (CARDIZEM) 100 mg in 0.9% sodium chloride (MBP/ADV) 100 mL infusion  10 mg/hr IntraVENous CONTINUOUS Murriel Osler.  MEHNAZ Alex 10 mL/hr at 04/13/17 2340 10 mg/hr at 04/13/17 2340    PHENYLephrine 30 mg in 0.9% sodium chloride 250 mL (ANISH-SYNEPHRINE) infusion   mcg/min IntraVENous TITRATE Pelon Zepeda   Stopped at 04/05/17 1300    sodium chloride (NS) flush 5-10 mL  5-10 mL IntraVENous PRN Julianne Oseguera MD        glucose chewable tablet 16 g  4 Tab Oral PRN Julianne Oseguera MD        glucagon (GLUCAGEN) injection 1 mg  1 mg IntraMUSCular PRN An King MD        dextrose (D50W) injection syrg 12.5-25 g  25-50 mL IntraVENous PRN An King MD        fentaNYL citrate (PF) injection 50 mcg  50 mcg IntraVENous Q10MIN PRN An King MD        atorvastatin (LIPITOR) tablet 40 mg  40 mg Oral DAILY Mauro Vaz MD   40 mg at 04/13/17 9524    clopidogrel (PLAVIX) tablet 75 mg  75 mg Oral DAILY Mauro Vaz MD   75 mg at 04/13/17 0813    cyanocobalamin tablet 1,000 mcg  1,000 mcg Oral DAILY Mauro Vaz MD   1,000 mcg at 04/13/17 0813    sodium chloride (NS) flush 5-10 mL  5-10 mL IntraVENous Antonio Hauser MD   10 mL at 04/14/17 8333    sodium chloride (NS) flush 5-10 mL  5-10 mL IntraVENous PRN Mauro Vaz MD   10 mL at 04/09/17 1016    morphine injection 2 mg  2 mg IntraVENous Q2H PRN Mauro Vaz MD   2 mg at 04/14/17 0245    naloxone (NARCAN) injection 0.4 mg  0.4 mg IntraVENous PRN Mauro Vza MD        ondansetron [facility]) injection 4 mg  4 mg IntraVENous Q4H PRN Mauro Vaz MD        diphenhydrAMINE (BENADRYL) injection 12.5 mg  12.5 mg IntraVENous Q4H PRN Mauro Vaz MD   12.5 mg at 04/11/17 0100    docusate sodium (COLACE) capsule 100 mg  100 mg Oral BID Mauro Vaz MD   100 mg at 04/13/17 1809        ALLERGIES:  No Known Allergies     Review of Systems:    Claudication/rest pain RLE, denies DM or neuropathy    Objective:     Lab Review:   Recent Results (from the past 24 hour(s))   PTT    Collection Time: 04/13/17 10:40 AM   Result Value Ref Range    aPTT 78.4 (H) 23.0 - 36.4 SEC   PROTHROMBIN TIME + INR    Collection Time: 04/13/17 10:40 AM   Result Value Ref Range    Prothrombin time 14.2 11.5 - 15.2 sec    INR 1.1 0.8 - 1.2         RADIOLOGY: n/a    MICROBIOLOGY: n/a    VASCULAR: s/p ax-fem and most recently fem-pop on Monday        PHYSICAL EXAMINATION:    Limited to the b/l LE    GENERAL:   This patient is a 59 y.o. male who is seen at bedside and appears stated age, is pleasant, well developed, well nourished and with good attention to hygiene and body habitus. Oriented to person, place and time. Mood and affect appear agitated and pt in significant pain RLE. Sitting on side of bed. Very difficult to examine secondary to pain. VITALS:   Visit Vitals    /76 (BP 1 Location: Left arm, BP Patient Position: At rest)    Pulse (!) 103    Temp 98.5 °F (36.9 °C)    Resp 18    Ht 5' 10\" (1.778 m)    Wt 82.5 kg (181 lb 12.8 oz)    SpO2 96%    BMI 26.09 kg/m2       VASCULAR:   DP non-palpable b/l. PT non-palpable b/l. Skin temperature is warm to warm proximal to distal b/l. CFT delayed to digits b/l. Significant RLE edema but no signs of infection. No varicosities. Digital hair growth is absent b/l. NEUROLOGICAL:  Light touch intact. INTEGUMENT:  Skin is warm and excessively dry with extensive edema RLE. Webspaces are unable to be examined. Necrotic portions include the following. Distal most aspect right hallux and right lateral calf and plantar right heel. HKT are present b/l. Toenails are thickened, discolored, dystrophic, incurvated, nonelongated b/l. Dorsal right foot bulla, no soi, from significant edema. .     MUSCULOSKELETAL:  Muscle strength is not examined. Muscle tone is normal b/l. No paralysis or spasticity b/l. Decreased arch heights b/l. No hallux abduction noted. Digital contractures 2-5 b/l. Dorsiflexion of the ankle joint unable to be examined No amputations present. Inspection and palpation of remaining bones, joints, and muscles is unremarkable. GAIT ANALYSIS:  Not evaluated today           Impression:     1. PVD s/p RLE fem pop  2. Ulcers/necrotic areas right foot X 3    Recommendation:       PROCEDURES:   None    CONSERVATIVE:   All therapies and their rationale reviewed with the patient in hopes of avoiding surgery and limb loss     ORDERS:  1. Allow for tissue demarcation.   There are no signs of infection. Reperfusion edema only. 2.  Betadine dressings, non compressive, with 4x4s, conform once daily  3. Multipodus boot RLE to protect leg/heel and minimize pressure  4. No pod surgical intervention needed at this time.   Discussed with vascular    SIGNED BY:  Alma Dumont DPM  April 14, 2017  7:43 AM

## 2017-04-14 NOTE — PROGRESS NOTES
Cardiology called about heart rate being elevated. Order to increase cardizem drip to 10 mg.hr received. Increased cardizem drip to 10 mg/hr via pump. Will continue to monitor.      04/14/17 0000   Vital Signs   Temp 98.6 °F (37 °C)   Temp Source Oral   Pulse (Heart Rate) (!) 129   Resp Rate 18   O2 Sat (%) 97 %   Level of Consciousness Alert   /70   MAP (Calculated) 86   BP 1 Method Automatic   BP 1 Location Left arm   BP Patient Position At rest   MEWS Score 3   Patient Observation   Patient Turned Turns self   Observations addressed 4 ps   Hourly Rounds Yes   Activity In recliner

## 2017-04-14 NOTE — PROGRESS NOTES
Problem: Mobility Impaired (Adult and Pediatric)  Goal: *Acute Goals and Plan of Care (Insert Text)  STGs to be addressed within 3 days:  1. Bed mobility: Supine to sit to supine min/CGA with HR for meals. 2. Activity tolerance: Tolerate up in chair 1-2 hrs for ADLs. 3. Transfers: Sit to stand to chair mod/min A with LRAD for ADLs. LTGs to be addressed within 7 days:  1. Standing/Ambulation Balance: Increase to Good with LRAD for safe transfers and gait. 2. Ambulation: Ambulate > 100 ft. CGA with LRAD for home mobility. 3. Transfers: Sit to stand min/CGA with LRAD for ADLs  4. Stairs: Up/Down 2 steps CGA with HR for home entry. PHYSICAL THERAPY TREATMENT     Patient: Sheri Moser (83 y.o. male)  Date: 4/14/2017  Diagnosis: Atheroscler of native artery of both legs with intermit claudication ([facility] [I70.213] <principal problem not specified>  Procedure(s) (LRB):  FEMORAL-POPLITEAL BYPASS/WITH GRAFT RIGHT LEG (Right) 4 Days Post-Op  Precautions: Fall (need clarification on ROM/WB status of RUE/shoulder)  Chart, physical therapy assessment, plan of care and goals were reviewed. ASSESSMENT:  Weight bearing status clarified with R UE WBAT and no restriction on LE's per vascular team.  Pt found to have substantial safety awareness deficits as indicated by pt being found ambulating from BR with IV pole for support and severely antalgic while wrapped in cordage (IV,telephone,telephone ). Pt demo's increased ambulation proficiency with use of RW and min-moderate verbal cues for sequence and safety. Progression toward goals:  [ ]      Improving appropriately and progressing toward goals  [X]      Improving slowly and progressing toward goals  [ ]      Not making progress toward goals and plan of care will be adjusted       PLAN:  Patient continues to benefit from skilled intervention to address the above impairments. Continue treatment per established plan of care.   Discharge Recommendations: Rehab  Further Equipment Recommendations for Discharge:  N/A       SUBJECTIVE:   Patient stated Darleen Cao was just going on what the other ramsey said to do.   Pt indicates that a physician told him that he could get up and walk to the bathroom unaccompanied. OBJECTIVE DATA SUMMARY:   Critical Behavior:  Neurologic State: Alert  Orientation Level: Oriented X4  Cognition: Follows commands  Safety/Judgement: Fall prevention  Functional Mobility Training:  Transfers:  Sit to Stand: Stand-by asssistance  Stand to Sit: Stand-by asssistance  Bed to Chair: Contact guard assistance  Other: stand step with RW  Balance:  Sitting: Intact  Standing: Impaired; With support  Standing - Static: Fair  Standing - Dynamic : Fair  Ambulation/Gait Training:  Distance (ft): 25 Feet (ft)  Assistive Device: Walker, rolling  Ambulation - Level of Assistance: Minimal assistance  Gait Abnormalities: Decreased step clearance; Antalgic; Step to gait  Right Side Weight Bearing: As tolerated  Base of Support: Widened  Speed/Suzanna: Slow  Interventions: Verbal cues; Visual/Demos; Tactile cues  Pain:  Pt reports 8/10 pain or discomfort prior to treatment. Pt reports 9/10 pain or discomfort post treatment. Activity Tolerance:   Fair:  Limited significantly by R LE pain. Please refer to the flowsheet for vital signs taken during this treatment.   After treatment:   [X] Patient left in no apparent distress sitting up in chair  [ ] Patient left in no apparent distress in bed  [X] Call bell left within reach  [ ] Nursing notified  [X] Caregiver present  [ ] Bed alarm activated      Aron Yao PTA   Time Calculation: 23 mins

## 2017-04-15 LAB
INR PPP: 1.5 (ref 0.8–1.2)
PROTHROMBIN TIME: 17.6 SEC (ref 11.5–15.2)

## 2017-04-15 PROCEDURE — 36415 COLL VENOUS BLD VENIPUNCTURE: CPT | Performed by: PHYSICIAN ASSISTANT

## 2017-04-15 PROCEDURE — 74011250636 HC RX REV CODE- 250/636: Performed by: PHYSICIAN ASSISTANT

## 2017-04-15 PROCEDURE — 74011250637 HC RX REV CODE- 250/637: Performed by: INTERNAL MEDICINE

## 2017-04-15 PROCEDURE — 97535 SELF CARE MNGMENT TRAINING: CPT

## 2017-04-15 PROCEDURE — 74011250637 HC RX REV CODE- 250/637: Performed by: SURGERY

## 2017-04-15 PROCEDURE — 97530 THERAPEUTIC ACTIVITIES: CPT

## 2017-04-15 PROCEDURE — 74011250637 HC RX REV CODE- 250/637: Performed by: PHYSICIAN ASSISTANT

## 2017-04-15 PROCEDURE — 74011250636 HC RX REV CODE- 250/636: Performed by: SURGERY

## 2017-04-15 PROCEDURE — 65660000004 HC RM CVT STEPDOWN

## 2017-04-15 PROCEDURE — 85610 PROTHROMBIN TIME: CPT | Performed by: PHYSICIAN ASSISTANT

## 2017-04-15 RX ORDER — LORAZEPAM 2 MG/ML
1 INJECTION INTRAMUSCULAR
Status: DISCONTINUED | OUTPATIENT
Start: 2017-04-15 | End: 2017-04-21 | Stop reason: HOSPADM

## 2017-04-15 RX ORDER — LORAZEPAM 2 MG/ML
3 INJECTION INTRAMUSCULAR
Status: DISCONTINUED | OUTPATIENT
Start: 2017-04-15 | End: 2017-04-21 | Stop reason: HOSPADM

## 2017-04-15 RX ORDER — WARFARIN SODIUM 5 MG/1
5 TABLET ORAL ONCE
Status: COMPLETED | OUTPATIENT
Start: 2017-04-15 | End: 2017-04-15

## 2017-04-15 RX ORDER — LORAZEPAM 1 MG/1
1 TABLET ORAL
Status: DISCONTINUED | OUTPATIENT
Start: 2017-04-15 | End: 2017-04-21 | Stop reason: HOSPADM

## 2017-04-15 RX ORDER — LORAZEPAM 1 MG/1
2 TABLET ORAL
Status: DISCONTINUED | OUTPATIENT
Start: 2017-04-15 | End: 2017-04-21 | Stop reason: HOSPADM

## 2017-04-15 RX ORDER — LORAZEPAM 2 MG/ML
2 INJECTION INTRAMUSCULAR
Status: DISCONTINUED | OUTPATIENT
Start: 2017-04-15 | End: 2017-04-21 | Stop reason: HOSPADM

## 2017-04-15 RX ADMIN — LORAZEPAM 3 MG: 2 INJECTION, SOLUTION INTRAMUSCULAR; INTRAVENOUS at 20:06

## 2017-04-15 RX ADMIN — METOPROLOL TARTRATE 50 MG: 50 TABLET ORAL at 09:03

## 2017-04-15 RX ADMIN — LORAZEPAM 3 MG: 2 INJECTION, SOLUTION INTRAMUSCULAR; INTRAVENOUS at 21:35

## 2017-04-15 RX ADMIN — METOPROLOL TARTRATE 50 MG: 50 TABLET ORAL at 00:06

## 2017-04-15 RX ADMIN — DILTIAZEM HYDROCHLORIDE 240 MG: 240 CAPSULE, COATED, EXTENDED RELEASE ORAL at 08:59

## 2017-04-15 RX ADMIN — Medication 10 ML: at 22:00

## 2017-04-15 RX ADMIN — DIGOXIN 0.25 MG: 0.25 TABLET ORAL at 08:59

## 2017-04-15 RX ADMIN — OXYCODONE HYDROCHLORIDE AND ACETAMINOPHEN 2 TABLET: 5; 325 TABLET ORAL at 12:26

## 2017-04-15 RX ADMIN — THERA TABS 1 TABLET: TAB at 08:59

## 2017-04-15 RX ADMIN — CLOPIDOGREL 75 MG: 75 TABLET, FILM COATED ORAL at 08:59

## 2017-04-15 RX ADMIN — METOPROLOL TARTRATE 50 MG: 50 TABLET ORAL at 17:14

## 2017-04-15 RX ADMIN — Medication 1000 MCG: at 08:59

## 2017-04-15 RX ADMIN — METOPROLOL TARTRATE 50 MG: 50 TABLET ORAL at 13:05

## 2017-04-15 RX ADMIN — LORAZEPAM 1 MG: 1 TABLET ORAL at 12:26

## 2017-04-15 RX ADMIN — ATORVASTATIN CALCIUM 40 MG: 40 TABLET, FILM COATED ORAL at 08:59

## 2017-04-15 RX ADMIN — ENOXAPARIN SODIUM 80 MG: 80 INJECTION, SOLUTION INTRAVENOUS; SUBCUTANEOUS at 12:19

## 2017-04-15 RX ADMIN — DIPHENHYDRAMINE HYDROCHLORIDE 12.5 MG: 50 INJECTION, SOLUTION INTRAMUSCULAR; INTRAVENOUS at 20:06

## 2017-04-15 RX ADMIN — LORAZEPAM 1 MG: 2 INJECTION, SOLUTION INTRAMUSCULAR; INTRAVENOUS at 17:00

## 2017-04-15 RX ADMIN — OXYCODONE HYDROCHLORIDE AND ACETAMINOPHEN 2 TABLET: 5; 325 TABLET ORAL at 06:53

## 2017-04-15 RX ADMIN — DOCUSATE SODIUM 100 MG: 100 CAPSULE, LIQUID FILLED ORAL at 08:59

## 2017-04-15 RX ADMIN — Medication 10 ML: at 04:01

## 2017-04-15 RX ADMIN — WARFARIN SODIUM 5 MG: 5 TABLET ORAL at 17:14

## 2017-04-15 RX ADMIN — DOCUSATE SODIUM 100 MG: 100 CAPSULE, LIQUID FILLED ORAL at 17:14

## 2017-04-15 NOTE — PROGRESS NOTES
Problem: Self Care Deficits Care Plan (Adult)  Goal: *Acute Goals and Plan of Care (Insert Text)  Occupational Therapy Goals  Initiated 4/13/2017 within 7 day(s). 1. Patient will perform grooming with supervision/set-up   2. Patient will perform upper body dressing with supervision/set-up. 3. Patient will perform lower body dressing with supervision/set-up with AE. 4. Patient will perform toilet transfers with supervision/set-up. 5. Patient will perform all aspects of toileting with supervision/set-up. Outcome: Progressing Towards Goal  OCCUPATIONAL THERAPY TREATMENT     Patient: Isai Buck (63 y.o. male)  Date: 4/15/2017  Diagnosis: Atheroscler of native artery of both legs with intermit claudication ([facility] [I70.213] <principal problem not specified>  Procedure(s) (LRB):  FEMORAL-POPLITEAL BYPASS/WITH GRAFT RIGHT LEG (Right) 5 Days Post-Op  Precautions:Fall  Chart, occupational therapy assessment, plan of care, and goals were reviewed. ASSESSMENT:  Pt is up in the chair upon entry, stating he just walked to the bathroom with nurse, so feeling a little tired. Pt participated in ADL grooming task, UB dressing seated in the chair requiring occasional VC to maintain attention to task as pt is getting distracted by cars outside the window. Following ADL task pt participated in functional txfr to the chair, simulated txfr to [facility], requiring CGA during txfr (HHA) and VCs for pacing and safe std->sit. Pt reports wanting to return to the recliner immediately upon txfr to chair, required VCs for hands placement on recliner arms for safety. Pt left in the recliner with call bell within reach. Pt is educated on the importance of calling for assistance when needed to get up for any reason. Pt verbalized understanding. Pt's nurse notified of the session.   Progression toward goals:  [ ]          Improving appropriately and progressing toward goals  [X]          Improving slowly and progressing toward goals  [ ] Not making progress toward goals and plan of care will be adjusted       PLAN:  Patient continues to benefit from skilled intervention to address the above impairments. Continue treatment per established plan of care. Discharge Recommendations:  Saravanan Tripathi  Further Equipment Recommendations for Discharge:  N/A      G-CODES:      Self Care  Current  CJ= 20-39%. The severity rating is based on the Level of Assistance required for Functional Mobility and ADLs. SUBJECTIVE:   Patient stated I am ok. I will not get up by myself until I'm safe to do that.       OBJECTIVE DATA SUMMARY:   Cognitive/Behavioral Status:  Neurologic State: Alert  Orientation Level: Oriented X4  Cognition: Follows commands, Appropriate decision making  Safety/Judgement: Fall prevention     Functional Mobility and Transfers for ADLs:                          Transfers:  Sit to Stand: Stand-by asssistance              Toilet Transfer : Contact guard assistance (simulated txfr to [facility])               Balance:  Sitting: Intact  Standing: Impaired  Standing - Static: Fair  Standing - Dynamic : Fair     ADL Intervention:   Grooming  Grooming Assistance: Supervision/set up (seated in the chair)  Washing Face: Supervision/set-up  Washing Hands: Supervision/set-up  Brushing Teeth: Supervision/set-up  Brushing/Combing Hair: Supervision/set-up  [address]: Supervision/ set-up  Shirt simulation with hospital gown: Supervision/set-up  Pain:  Pt reports 5/10 pain or discomfort prior to treatment. Pt reports 5/10 pain or discomfort post treatment. In R foot, reports as \"stinging\". Pt's nurse notified     Activity Tolerance:    Fair     Please refer to the flowsheet for vital signs taken during this treatment.   After treatment:   [X]  Patient left in no apparent distress sitting up in chair  [ ]  Patient left in no apparent distress in bed  [X]  Call bell left within reach  [X]  Nursing notified  [ ]  Caregiver present  [ ]  Bed alarm activated     DARSHANA Jewell/L  Time Calculation: 24 mins

## 2017-04-15 NOTE — PROGRESS NOTES
Admit Date: 2017    POD 5 Days Post-Op    Procedure:  Procedure(s): FEMORAL-POPLITEAL BYPASS/WITH GRAFT RIGHT LEG    Subjective:     Patient had confusion and agitation overnight but back to baseline  No family at bedside during my rounds but spoke with nurse  Her assessment reveals that pt likely in DTs from alcohol so protocol initiated    Incidentally swelling and pain in right leg much improved from yesterday which is a relief    Objective:     Blood pressure 117/58, pulse (!) 120, temperature 98.8 °F (37.1 °C), resp. rate 20, height 5' 10\" (1.778 m), weight 187 lb 8 oz (85 kg), SpO2 99 %. Temp (24hrs), Av.9 °F (37.2 °C), Min:98.6 °F (37 °C), Max:99 °F (37.2 °C)      Physical Exam:    Improved right leg edema and ecchymosis  All incisions intact but thigh incision with some mild drainage  Blisters he had on foot now draining so dressings being applied  Otherwise leg warm and well perfused. Discoloration/erythema felt to be hyperemic from reperfusion as is the reperfusion edema    Labs:   Recent Results (from the past 24 hour(s))   PROTHROMBIN TIME + INR    Collection Time: 04/15/17 12:51 AM   Result Value Ref Range    Prothrombin time 17.6 (H) 11.5 - 15.2 sec    INR 1.5 (H) 0.8 - 1.2         Assessment:     Active Problems:     Aortoiliac occlusive disease ([facility] (2017)      Atherosclerosis of native artery of both lower extremities with intermittent claudication ([address] (2017)      PAD (peripheral artery disease) ([facility] (2017)        Plan/Recommendations/Medical Decision Making:     Continue current treatment  Planned cardioversion with cardiology on Monday  INR dosing

## 2017-04-15 NOTE — PROGRESS NOTES
Patient very confused, pulling at IV's, dressings taking off clothes etc. Order obtained for a sitter.

## 2017-04-15 NOTE — PROGRESS NOTES
[facility]  -  Progress Note      Patient: Stanley Domingo MRN: [id number]  SSN: [id number]    YOB: 1960  Age: 59 y.o. Sex: male      Admit Date: 4/4/2017    Hospital Problem List:     Hospital Problems  Date Reviewed: 4/4/2017          Codes Class Noted POA    Aortoiliac occlusive disease ([facility] ICD-10-CM: I74.09  ICD-9-CM: 444.09  1/25/2017 Unknown        Atherosclerosis of native artery of both lower extremities with intermittent claudication ([facility] ICD-10-CM: I61.981  ICD-9-CM: 440.21  1/25/2017 Unknown        PAD (peripheral artery disease) ([facility] ICD-10-CM: I73.9  ICD-9-CM: 443.9  1/25/2017 Unknown                Plan:     -Off cardizem gtt. Continued on PO cardizem and digoxin. Rates still not well controlled last night. Although, pt is currently not on monitor as he took it off last night with agitation and confusion. Asking nursing staff to put patient back on tele. Pending HR trend this morning after morning meds will make adjustments.   -Continue coumadin. No unusual bleeding. INR 1.5. OK to stop lopressor when INR > 2.0  -Continue on statin and plavix. Subjective:     Confused overnight and agitated. Patient pulled out all of his lines and took off his dressings.      Objective:      Patient Vitals for the past 8 hrs:   Temp Pulse Resp BP SpO2   04/15/17 0750 98.9 °F (37.2 °C) (!) 132 22 98/62 98 %   04/15/17 0400 98.6 °F (37 °C) (!) 130 20 101/68 98 %   04/15/17 0004 99 °F (37.2 °C) 75 20 110/63 99 %         Patient Vitals for the past 96 hrs:   Weight   04/15/17 0400 85 kg (187 lb 8 oz)   04/14/17 0530 84.9 kg (187 lb 3.6 oz)   04/13/17 0650 82.5 kg (181 lb 12.8 oz)   04/12/17 0310 84.1 kg (185 lb 6.5 oz)         Intake/Output Summary (Last 24 hours) at 04/15/17 0803  Last data filed at 04/15/17 0400   Gross per 24 hour   Intake           407.81 ml   Output              360 ml   Net            47.81 ml       Physical Exam:  General:  Awake, alert, oriented x 3, pleasant/talkative/cheerful  Neck:  Supple, no jvd  Lungs:  Clear to auscultation bilaterally on room air  Heart:  Tachy, irregular rhythm, no murmur  Abdomen: soft, non-tender  Extremities:  Some areas of ecchymosis to upper extremities and abdomen, RLE with dressings in place c/d/i    Data Review:     Labs: Results:       Chemistry No results for input(s): GLU, NA, K, CL, CO2, BUN, CREA, CA, MG, PHOS, AGAP, BUCR, TBIL, GPT, AP, TP, ALB, GLOB, AGRAT in the last 72 hours. CBC w/Diff Recent Labs      04/13/17   0307   WBC  16.9*   RBC  3.39*   HGB  10.1*   HCT  29.1*   PLT  365   GRANS  83*   LYMPH  13*   EOS  1      Cardiac Enzymes No results found for: CPK, CKMMB, CKMB, RCK3, CKMBT, CKNDX, CKND1, LEO, TROPT, TROIQ, DEWAYNE, TROPT, TNIPOC, BNP, BNPP   Coagulation Recent Labs      04/15/17   0051  04/14/17   0550  04/13/17   1040   04/13/17   0307   PTP  17.6*  15.4*  14.2   < >   --    INR  1.5*  1.3*  1.1   < >   --    APTT   --    --   78.4*   --   69.8*    < > = values in this interval not displayed. Lipid Panel Lab Results   Component Value Date/Time    Cholesterol, total 157 12/29/2015 07:18 AM    HDL Cholesterol 28 12/29/2015 07:18 AM    LDL, calculated 112.4 12/29/2015 07:18 AM    VLDL, calculated 16.6 12/29/2015 07:18 AM    Triglyceride 83 12/29/2015 07:18 AM    CHOL/HDL Ratio 5.6 12/29/2015 07:18 AM      BNP No results found for: BNP, BNPP, XBNPT   Liver Enzymes No results for input(s): TP, ALB, TBIL, AP, SGOT, GPT in the last 72 hours.     No lab exists for component: DBIL   Digoxin    Thyroid Studies Lab Results   Component Value Date/Time    TSH 4.77 04/06/2017 05:30 AM            Signed By: JUNE Phillips     April 15, 2017

## 2017-04-16 LAB
INR PPP: 1.8 (ref 0.8–1.2)
PROTHROMBIN TIME: 20.4 SEC (ref 11.5–15.2)

## 2017-04-16 PROCEDURE — 77030013140 HC MSK NEB VYRM -A

## 2017-04-16 PROCEDURE — 65660000004 HC RM CVT STEPDOWN

## 2017-04-16 PROCEDURE — 77030020186 HC BOOT HL PROTCT SAGE -B

## 2017-04-16 PROCEDURE — 85610 PROTHROMBIN TIME: CPT | Performed by: PHYSICIAN ASSISTANT

## 2017-04-16 PROCEDURE — 74011250636 HC RX REV CODE- 250/636: Performed by: PHYSICIAN ASSISTANT

## 2017-04-16 PROCEDURE — 74011250636 HC RX REV CODE- 250/636: Performed by: INTERNAL MEDICINE

## 2017-04-16 PROCEDURE — 74011000250 HC RX REV CODE- 250: Performed by: INTERNAL MEDICINE

## 2017-04-16 PROCEDURE — 74011250636 HC RX REV CODE- 250/636: Performed by: SURGERY

## 2017-04-16 PROCEDURE — 36415 COLL VENOUS BLD VENIPUNCTURE: CPT | Performed by: PHYSICIAN ASSISTANT

## 2017-04-16 PROCEDURE — 74011250637 HC RX REV CODE- 250/637: Performed by: SURGERY

## 2017-04-16 PROCEDURE — 74011000258 HC RX REV CODE- 258: Performed by: SURGERY

## 2017-04-16 PROCEDURE — 77030010545

## 2017-04-16 PROCEDURE — 77030011256 HC DRSG MEPILEX <16IN NO BORD MOLN -A

## 2017-04-16 RX ORDER — METOPROLOL TARTRATE 5 MG/5ML
5 INJECTION INTRAVENOUS EVERY 4 HOURS
Status: DISCONTINUED | OUTPATIENT
Start: 2017-04-16 | End: 2017-04-19

## 2017-04-16 RX ORDER — DIGOXIN 0.25 MG/ML
125 INJECTION INTRAMUSCULAR; INTRAVENOUS DAILY
Status: DISCONTINUED | OUTPATIENT
Start: 2017-04-16 | End: 2017-04-19

## 2017-04-16 RX ORDER — METOPROLOL TARTRATE 5 MG/5ML
5 INJECTION INTRAVENOUS EVERY 6 HOURS
Status: DISCONTINUED | OUTPATIENT
Start: 2017-04-16 | End: 2017-04-16

## 2017-04-16 RX ADMIN — DIPHENHYDRAMINE HYDROCHLORIDE 12.5 MG: 50 INJECTION, SOLUTION INTRAMUSCULAR; INTRAVENOUS at 22:30

## 2017-04-16 RX ADMIN — METOPROLOL TARTRATE 5 MG: 5 INJECTION INTRAVENOUS at 09:29

## 2017-04-16 RX ADMIN — PIPERACILLIN SODIUM,TAZOBACTAM SODIUM 3.38 G: 3; .375 INJECTION, POWDER, FOR SOLUTION INTRAVENOUS at 17:00

## 2017-04-16 RX ADMIN — LORAZEPAM 2 MG: 2 INJECTION, SOLUTION INTRAMUSCULAR; INTRAVENOUS at 09:28

## 2017-04-16 RX ADMIN — PIPERACILLIN SODIUM,TAZOBACTAM SODIUM 3.38 G: 3; .375 INJECTION, POWDER, FOR SOLUTION INTRAVENOUS at 22:31

## 2017-04-16 RX ADMIN — LORAZEPAM 2 MG: 2 INJECTION, SOLUTION INTRAMUSCULAR; INTRAVENOUS at 11:16

## 2017-04-16 RX ADMIN — DOCUSATE SODIUM 100 MG: 100 CAPSULE, LIQUID FILLED ORAL at 18:15

## 2017-04-16 RX ADMIN — Medication 2 MG: at 02:00

## 2017-04-16 RX ADMIN — METOPROLOL TARTRATE 5 MG: 5 INJECTION INTRAVENOUS at 16:00

## 2017-04-16 RX ADMIN — METOPROLOL TARTRATE 5 MG: 5 INJECTION INTRAVENOUS at 13:34

## 2017-04-16 RX ADMIN — Medication 10 ML: at 06:24

## 2017-04-16 RX ADMIN — Medication 10 ML: at 22:31

## 2017-04-16 RX ADMIN — METOPROLOL TARTRATE 5 MG: 5 INJECTION INTRAVENOUS at 03:50

## 2017-04-16 RX ADMIN — LORAZEPAM 3 MG: 2 INJECTION, SOLUTION INTRAMUSCULAR; INTRAVENOUS at 03:45

## 2017-04-16 RX ADMIN — METOPROLOL TARTRATE 5 MG: 5 INJECTION INTRAVENOUS at 22:00

## 2017-04-16 RX ADMIN — LORAZEPAM 3 MG: 2 INJECTION, SOLUTION INTRAMUSCULAR; INTRAVENOUS at 22:29

## 2017-04-16 RX ADMIN — DIPHENHYDRAMINE HYDROCHLORIDE 12.5 MG: 50 INJECTION, SOLUTION INTRAMUSCULAR; INTRAVENOUS at 03:45

## 2017-04-16 RX ADMIN — ENOXAPARIN SODIUM 80 MG: 80 INJECTION, SOLUTION INTRAVENOUS; SUBCUTANEOUS at 00:00

## 2017-04-16 RX ADMIN — Medication 10 ML: at 16:00

## 2017-04-16 RX ADMIN — LORAZEPAM 2 MG: 2 INJECTION, SOLUTION INTRAMUSCULAR; INTRAVENOUS at 18:43

## 2017-04-16 RX ADMIN — ENOXAPARIN SODIUM 80 MG: 80 INJECTION, SOLUTION INTRAVENOUS; SUBCUTANEOUS at 22:29

## 2017-04-16 RX ADMIN — DIGOXIN 125 MCG: 250 INJECTION, SOLUTION INTRAMUSCULAR; INTRAVENOUS at 13:34

## 2017-04-16 RX ADMIN — ENOXAPARIN SODIUM 80 MG: 80 INJECTION, SOLUTION INTRAVENOUS; SUBCUTANEOUS at 13:35

## 2017-04-16 NOTE — PROGRESS NOTES
[facility]  -  Progress Note      Patient: Richelle Mak MRN: [id number]  SSN: [id number]    YOB: 1960  Age: 59 y.o. Sex: male      Admit Date: 4/4/2017    Hospital Problem List:     Hospital Problems  Date Reviewed: 4/4/2017          Codes Class Noted POA    Aortoiliac occlusive disease ([facility] ICD-10-CM: I74.09  ICD-9-CM: 444.09  1/25/2017 Unknown        Atherosclerosis of native artery of both lower extremities with intermittent claudication ([facility] ICD-10-CM: M64.332  ICD-9-CM: 440.21  1/25/2017 Unknown        PAD (peripheral artery disease) ([facility] ICD-10-CM: I73.9  ICD-9-CM: 443.9  1/25/2017 Unknown               - A.fib. Persistent for past 2 months, HR 150s as outpatient and asymptomatic, HR still 100-120s on cardizem and digoxin PO. IV metoprolol added overnight.     - PAD, s/p aortoiliac occlusive disease s/p R axillary fem bypass and fem-pop 4/10/17. Now on Plavix, Lovenox/warfarin  - Acute Kidney Injury. Resolved, Cr back to baseline  - Cardiomyopathy, ischemic. ECHO 2/24/17 EF 35-40% mod. diffuse hypokinesis, no evidence of ADHF  - s/p cath 3/15/17 with drug eluding stents to LCx and RCA  - Asymptomatic carotid artery disease  - Essential Hypertension  - Anemia, post-operatively, s/p 2 units PRBC, Hgb stable  - DTs, agitated/confusion at night x 2, given sedation and restraints added for safety d/t pulling out lines.     Plan:     -On PO cardizem, digoxin. IV metoprolol added overnight. 's this morning while sleeping/sedated. -Plan for BERRY/cardioversion tomorrow. NPO after midnight tonight.   -Continue current regimen.      Subjective:     Sleeping/snoring/sedated/2 point soft restraints in place    Objective:      Patient Vitals for the past 8 hrs:   Temp Pulse Resp BP SpO2   04/16/17 0400 98.4 °F (36.9 °C) (!) 137 18 127/80 96 %   04/16/17 0000 98.8 °F (37.1 °C) 100 18 131/72 95 %         Patient Vitals for the past 96 hrs:   Weight   04/16/17 0400 85.8 kg (189 lb 1.6 oz)   04/15/17 0400 85 kg (187 lb 8 oz)   04/14/17 0530 84.9 kg (187 lb 3.6 oz)   04/13/17 0650 82.5 kg (181 lb 12.8 oz)         Intake/Output Summary (Last 24 hours) at 04/16/17 0752  Last data filed at 04/16/17 0400   Gross per 24 hour   Intake              960 ml   Output                0 ml   Net              960 ml       Physical Exam:  General: sleepy, snoring, unable to awaken d/t sedation  Neck:  supple  Lungs:  Significant upper airway noise making auscultation difficult, ? Mild expiratory wheezing bilaterally  Heart: tachy, regula rrhythm  Abdomen:  soft  Extremities:  RLE dressing to medial thigh, calf, and foot in place, intact, and dry. LLE without edema or cyanosis    Data Review:     Labs: Results:       Chemistry No results for input(s): GLU, NA, K, CL, CO2, BUN, CREA, CA, MG, PHOS, AGAP, BUCR, TBIL, GPT, AP, TP, ALB, GLOB, AGRAT in the last 72 hours. CBC w/Diff No results for input(s): WBC, RBC, HGB, HCT, PLT, GRANS, LYMPH, EOS, HGBEXT, HCTEXT, PLTEXT in the last 72 hours. Cardiac Enzymes No results found for: CPK, CKMMB, CKMB, RCK3, CKMBT, CKNDX, CKND1, LEO, TROPT, TROIQ, DEWAYNE, TROPT, TNIPOC, BNP, BNPP   Coagulation Recent Labs      04/16/17   0059  04/15/17   0051   04/13/17   1040   PTP  20.4*  17.6*   < >  14.2   INR  1.8*  1.5*   < >  1.1   APTT   --    --    --   78.4*    < > = values in this interval not displayed. Lipid Panel Lab Results   Component Value Date/Time    Cholesterol, total 157 12/29/2015 07:18 AM    HDL Cholesterol 28 12/29/2015 07:18 AM    LDL, calculated 112.4 12/29/2015 07:18 AM    VLDL, calculated 16.6 12/29/2015 07:18 AM    Triglyceride 83 12/29/2015 07:18 AM    CHOL/HDL Ratio 5.6 12/29/2015 07:18 AM      BNP No results found for: BNP, BNPP, XBNPT   Liver Enzymes No results for input(s): TP, ALB, TBIL, AP, SGOT, GPT in the last 72 hours.     No lab exists for component: DBIL   Digoxin    Thyroid Studies Lab Results   Component Value Date/Time    TSH 4.77 04/06/2017 05:30 AM            Signed By: JUNE Darnell     April 16, 2017

## 2017-04-16 NOTE — PROGRESS NOTES
Resting now  Plan for BERRY cardioversion possibly tomorrow   Rapid faint pulse in bypass graft  Hopefully cardioversion will help this

## 2017-04-17 ENCOUNTER — PATIENT OUTREACH (OUTPATIENT)
Dept: FAMILY MEDICINE CLINIC | Age: 57
End: 2017-04-17

## 2017-04-17 PROBLEM — I48.91 A-FIB (HCC): Status: ACTIVE | Noted: 2017-04-17

## 2017-04-17 LAB
ALBUMIN SERPL BCP-MCNC: 2.3 G/DL (ref 3.4–5)
ALBUMIN/GLOB SERPL: 0.5 {RATIO} (ref 0.8–1.7)
ALP SERPL-CCNC: 655 U/L (ref 45–117)
ALT SERPL-CCNC: 31 U/L (ref 16–61)
ANION GAP BLD CALC-SCNC: 11 MMOL/L (ref 3–18)
ARTERIAL PATENCY WRIST A: YES
AST SERPL W P-5'-P-CCNC: 60 U/L (ref 15–37)
BASE EXCESS BLD CALC-SCNC: 1 MMOL/L
BDY SITE: ABNORMAL
BILIRUB SERPL-MCNC: 1.9 MG/DL (ref 0.2–1)
BUN SERPL-MCNC: 9 MG/DL (ref 7–18)
BUN/CREAT SERPL: 12 (ref 12–20)
CALCIUM SERPL-MCNC: 8.6 MG/DL (ref 8.5–10.1)
CHLORIDE SERPL-SCNC: 102 MMOL/L (ref 100–108)
CO2 SERPL-SCNC: 25 MMOL/L (ref 21–32)
CREAT SERPL-MCNC: 0.78 MG/DL (ref 0.6–1.3)
ERYTHROCYTE [DISTWIDTH] IN BLOOD BY AUTOMATED COUNT: 15.5 % (ref 11.6–14.5)
GAS FLOW.O2 O2 DELIVERY SYS: ABNORMAL L/MIN
GLOBULIN SER CALC-MCNC: 4.8 G/DL (ref 2–4)
GLUCOSE SERPL-MCNC: 82 MG/DL (ref 74–99)
HCO3 BLD-SCNC: 23.6 MMOL/L (ref 22–26)
HCT VFR BLD AUTO: 23.4 % (ref 36–48)
HGB BLD-MCNC: 8 G/DL (ref 13–16)
INR PPP: 1.9 (ref 0.8–1.2)
MCH RBC QN AUTO: 29.5 PG (ref 24–34)
MCHC RBC AUTO-ENTMCNC: 34.2 G/DL (ref 31–37)
MCV RBC AUTO: 86.3 FL (ref 74–97)
O2/TOTAL GAS SETTING VFR VENT: 0.21 %
PCO2 BLD: 28.5 MMHG (ref 35–45)
PH BLD: 7.53 [PH] (ref 7.35–7.45)
PLATELET # BLD AUTO: 412 K/UL (ref 135–420)
PMV BLD AUTO: 8.8 FL (ref 9.2–11.8)
PO2 BLD: 56 MMHG (ref 80–100)
POTASSIUM SERPL-SCNC: 4 MMOL/L (ref 3.5–5.5)
PROT SERPL-MCNC: 7.1 G/DL (ref 6.4–8.2)
PROTHROMBIN TIME: 21.3 SEC (ref 11.5–15.2)
RBC # BLD AUTO: 2.71 M/UL (ref 4.7–5.5)
SAO2 % BLD: 92 % (ref 92–97)
SERVICE CMNT-IMP: ABNORMAL
SODIUM SERPL-SCNC: 138 MMOL/L (ref 136–145)
SPECIMEN TYPE: ABNORMAL
TOTAL RESP. RATE, ITRR: 16
WBC # BLD AUTO: 15.8 K/UL (ref 4.6–13.2)

## 2017-04-17 PROCEDURE — 77030011256 HC DRSG MEPILEX <16IN NO BORD MOLN -A

## 2017-04-17 PROCEDURE — 85027 COMPLETE CBC AUTOMATED: CPT | Performed by: PHYSICIAN ASSISTANT

## 2017-04-17 PROCEDURE — 74011250636 HC RX REV CODE- 250/636: Performed by: SURGERY

## 2017-04-17 PROCEDURE — 74011250637 HC RX REV CODE- 250/637: Performed by: SURGERY

## 2017-04-17 PROCEDURE — 74011000258 HC RX REV CODE- 258: Performed by: SURGERY

## 2017-04-17 PROCEDURE — 74011250637 HC RX REV CODE- 250/637: Performed by: INTERNAL MEDICINE

## 2017-04-17 PROCEDURE — 82803 BLOOD GASES ANY COMBINATION: CPT

## 2017-04-17 PROCEDURE — 85610 PROTHROMBIN TIME: CPT | Performed by: PHYSICIAN ASSISTANT

## 2017-04-17 PROCEDURE — 74011250636 HC RX REV CODE- 250/636: Performed by: INTERNAL MEDICINE

## 2017-04-17 PROCEDURE — 80053 COMPREHEN METABOLIC PANEL: CPT | Performed by: INTERNAL MEDICINE

## 2017-04-17 PROCEDURE — 74011250637 HC RX REV CODE- 250/637: Performed by: PHYSICIAN ASSISTANT

## 2017-04-17 PROCEDURE — 74011250636 HC RX REV CODE- 250/636: Performed by: PHYSICIAN ASSISTANT

## 2017-04-17 PROCEDURE — 36600 WITHDRAWAL OF ARTERIAL BLOOD: CPT

## 2017-04-17 PROCEDURE — 74011000250 HC RX REV CODE- 250: Performed by: INTERNAL MEDICINE

## 2017-04-17 PROCEDURE — 74011000258 HC RX REV CODE- 258: Performed by: PHYSICIAN ASSISTANT

## 2017-04-17 PROCEDURE — 36415 COLL VENOUS BLD VENIPUNCTURE: CPT | Performed by: PHYSICIAN ASSISTANT

## 2017-04-17 PROCEDURE — 74011000250 HC RX REV CODE- 250: Performed by: PHYSICIAN ASSISTANT

## 2017-04-17 PROCEDURE — 65660000004 HC RM CVT STEPDOWN

## 2017-04-17 RX ORDER — WARFARIN SODIUM 5 MG/1
5 TABLET ORAL ONCE
Status: COMPLETED | OUTPATIENT
Start: 2017-04-17 | End: 2017-04-17

## 2017-04-17 RX ADMIN — Medication 2 MG: at 04:00

## 2017-04-17 RX ADMIN — DOCUSATE SODIUM 100 MG: 100 CAPSULE, LIQUID FILLED ORAL at 19:08

## 2017-04-17 RX ADMIN — ENOXAPARIN SODIUM 80 MG: 80 INJECTION, SOLUTION INTRAVENOUS; SUBCUTANEOUS at 23:14

## 2017-04-17 RX ADMIN — DILTIAZEM HYDROCHLORIDE 240 MG: 240 CAPSULE, COATED, EXTENDED RELEASE ORAL at 09:16

## 2017-04-17 RX ADMIN — METOPROLOL TARTRATE 5 MG: 5 INJECTION INTRAVENOUS at 05:15

## 2017-04-17 RX ADMIN — CLOPIDOGREL 75 MG: 75 TABLET, FILM COATED ORAL at 09:15

## 2017-04-17 RX ADMIN — Medication 10 ML: at 19:09

## 2017-04-17 RX ADMIN — METOPROLOL TARTRATE 5 MG: 5 INJECTION INTRAVENOUS at 23:13

## 2017-04-17 RX ADMIN — SODIUM CHLORIDE 10 MG/HR: 900 INJECTION, SOLUTION INTRAVENOUS at 12:09

## 2017-04-17 RX ADMIN — Medication 1000 MCG: at 09:17

## 2017-04-17 RX ADMIN — DOCUSATE SODIUM 100 MG: 100 CAPSULE, LIQUID FILLED ORAL at 09:16

## 2017-04-17 RX ADMIN — WARFARIN SODIUM 5 MG: 5 TABLET ORAL at 20:30

## 2017-04-17 RX ADMIN — LORAZEPAM 1 MG: 1 TABLET ORAL at 11:40

## 2017-04-17 RX ADMIN — METOPROLOL TARTRATE 5 MG: 5 INJECTION INTRAVENOUS at 20:00

## 2017-04-17 RX ADMIN — ATORVASTATIN CALCIUM 40 MG: 40 TABLET, FILM COATED ORAL at 09:17

## 2017-04-17 RX ADMIN — DIGOXIN 125 MCG: 250 INJECTION, SOLUTION INTRAMUSCULAR; INTRAVENOUS at 09:24

## 2017-04-17 RX ADMIN — PIPERACILLIN SODIUM,TAZOBACTAM SODIUM 3.38 G: 3; .375 INJECTION, POWDER, FOR SOLUTION INTRAVENOUS at 05:32

## 2017-04-17 RX ADMIN — THERA TABS 1 TABLET: TAB at 09:16

## 2017-04-17 RX ADMIN — PIPERACILLIN SODIUM,TAZOBACTAM SODIUM 3.38 G: 3; .375 INJECTION, POWDER, FOR SOLUTION INTRAVENOUS at 23:12

## 2017-04-17 RX ADMIN — METOPROLOL TARTRATE 5 MG: 5 INJECTION INTRAVENOUS at 19:08

## 2017-04-17 RX ADMIN — METOPROLOL TARTRATE 5 MG: 5 INJECTION INTRAVENOUS at 00:08

## 2017-04-17 RX ADMIN — ENOXAPARIN SODIUM 80 MG: 80 INJECTION, SOLUTION INTRAVENOUS; SUBCUTANEOUS at 11:16

## 2017-04-17 RX ADMIN — PIPERACILLIN SODIUM,TAZOBACTAM SODIUM 3.38 G: 3; .375 INJECTION, POWDER, FOR SOLUTION INTRAVENOUS at 19:00

## 2017-04-17 RX ADMIN — METOPROLOL TARTRATE 5 MG: 5 INJECTION INTRAVENOUS at 11:30

## 2017-04-17 RX ADMIN — Medication 10 ML: at 23:13

## 2017-04-17 RX ADMIN — Medication 10 ML: at 05:29

## 2017-04-17 RX ADMIN — PIPERACILLIN SODIUM,TAZOBACTAM SODIUM 3.38 G: 3; .375 INJECTION, POWDER, FOR SOLUTION INTRAVENOUS at 11:17

## 2017-04-17 RX ADMIN — METOPROLOL TARTRATE 5 MG: 5 INJECTION INTRAVENOUS at 09:20

## 2017-04-17 NOTE — PROGRESS NOTES
Problem: Mobility Impaired (Adult and Pediatric)  Goal: *Acute Goals and Plan of Care (Insert Text)  STGs to be addressed within 3 days:  1. Bed mobility: Supine to sit to supine min/CGA with HR for meals. 2. Activity tolerance: Tolerate up in chair 1-2 hrs for ADLs. 3. Transfers: Sit to stand to chair mod/min A with LRAD for ADLs. LTGs to be addressed within 7 days:  1. Standing/Ambulation Balance: Increase to Good with LRAD for safe transfers and gait. 2. Ambulation: Ambulate > 100 ft. CGA with LRAD for home mobility. 3. Transfers: Sit to stand min/CGA with LRAD for ADLs  4. Stairs: Up/Down 2 steps CGA with HR for home entry. Time: 1059                 Pt noted to have been quite lethargic and quite different since admission. Pt's nurse consulted and asks that PT hold treatment for pt today pending BERRY/cardioversion for tomorrow. Nurse currently starting cardizem drip and asks that PT be resumed tomorrow pending outcome of procedure.   Will follow up at next scheduled interval.     Isak Bradshaw PTA  4/17/2017

## 2017-04-17 NOTE — CONSULTS
Consult - initial evaluation     Patient: Isai Buck MRN: [id number]  SSN: [id number]    YOB: 1960  Age: 59 y.o. Sex: male      Subjective: Isai Buck is a 59 y.o. male with PMH of ASVD, Aortoiliac occlusive disease , CAD s/p drug - eluting stents , Occluded right internal carotid artery , severe PAD  Bilateral LL. ,HTN, recent MAYRA, hyponatremia , - Medicine is consulted for patient having change in mental status - lethargic     Past Medical History:   Diagnosis Date    A-fib ([address]     Abnormal EKG     CAD S/P percutaneous coronary angioplasty 03/2017    Essential hypertension     Lumbar canal stenosis 05/04/2015    Dr. Mirza Streeter PVD (peripheral vascular disease) [facility])      Past Surgical History:   Procedure Laterality Date    CARDIAC CATHETERIZATION  3/8/2017         CARDIAC CATHETERIZATION  3/15/2017         CORONARY ARTERY ANGIOGRAM  3/8/2017         CORONARY STENT EA ADDL VESSEL  3/15/2017         CORONARY STENT SINGLE W/PTCA  3/15/2017         HX COLONOSCOPY  07/23/2015    polyps repeat 2020 5 years Jay Henderson    LV ANGIOGRAPHY  3/8/2017           Family History   Problem Relation Age of Onset    Heart Disease Father      Social History   Substance Use Topics    Smoking status: Former Smoker    Smokeless tobacco: Never Used      Comment: quit in 2011    Alcohol use 1.2 oz/week     2 Cans of beer per week      Comment: 10 cans of beer a month      Prior to Admission medications    Medication Sig Start Date End Date Taking? Authorizing Provider   atorvastatin (LIPITOR) 40 mg tablet Take 1 Tab by mouth daily. 3/17/17  Yes Clifton Gil MD   clopidogrel (PLAVIX) 75 mg tab Take 1 Tab by mouth daily. 3/16/17  Yes JUNE Pearl   metoprolol tartrate (LOPRESSOR) 50 mg tablet Take 1 Tab by mouth three (3) times daily. 2/26/17  Yes Lexi Myrick MD   aspirin 81 mg chewable tablet Take 81 mg by mouth daily.    Yes Historical Provider   amLODIPine (NORVASC) 10 mg tablet Take 1 Tab by mouth daily. 10/6/16  Yes Lulla Leyden, MD   cyanocobalamin (VITAMIN B-12) 1,000 mcg tablet Take 1 Tab by mouth daily. 10/6/16  Yes Lulla Leyden, MD   hydrochlorothiazide (HYDRODIURIL) 25 mg tablet Take 1 Tab by mouth daily. 10/6/16  Yes Lulla Leyden, MD   losartan (COZAAR) 100 mg tablet Take 1 Tab by mouth daily. 10/6/16  Yes Lulla Leyden, MD   POTASSIUM PHOS,M-BASIC-D-BASIC (POTASSIUM PHOSPHATE PO) Take  by mouth. Take as directed   Yes Historical Provider   oxyCODONE-acetaminophen (PERCOCET) 5-325 mg per tablet Take 1 Tab by mouth every four (4) hours as needed for Pain. Max Daily Amount: 6 Tabs. 2/27/17   Skyler Canas MD   DULoxetine (CYMBALTA) 60 mg capsule Take 1 Cap by mouth daily. 2/15/17   Les Trujillo MD   avanafil (STENDRA) 100 mg tablet Take 1 Tab by mouth as needed for Other. Take 1 tab 15-30 mins prior to sexual acitivity 10/6/16   Lulla Leyden, MD        No Known Allergies    Review of Systems:  Review of systems not obtained due to patient factors.     Objective:     Vitals:    04/16/17 1941 04/16/17 2334 04/17/17 0423 04/17/17 0811   BP: 122/73 127/89 129/75 119/76   Pulse: (!) 131 (!) 142 (!) 134    Resp: 26 22 22 22   Temp: 99.8 °F (37.7 °C) 99.7 °F (37.6 °C) 98.6 °F (37 °C) 98.2 °F (36.8 °C)   SpO2: 91% 90% 93% 97%   Weight:   83.1 kg (183 lb 4.8 oz)    Height:            Physical Exam:  General appearance - sleepy   Mental status - alert, oriented to person, place, and time, drowsy  Eyes - pupils equal and reactive, extraocular eye movements intact  Ears - bilateral TM's and external ear canals normal  Nose - normal and patent, no erythema, discharge or polyps  Mouth - mucous membranes moist, pharynx normal without lesions  Neck - supple, no significant adenopathy  Chest - bilateral air present , no rales no rhonchi   Heart - irregularly irregular rhythm with rate 134  Abdomen - soft, nontender, nondistended, no masses or organomegaly  Neurological - not examined  Musculoskeletal - no joint tenderness, deformity or swelling  Skin - normal coloration and turgor, no rashes, no suspicious skin lesions noted     Assessment:     Hospital Problems  Date Reviewed: 4/4/2017          Codes Class Noted POA    Aortoiliac occlusive disease ([facility] ICD-10-CM: I74.09  ICD-9-CM: 444.09  1/25/2017 Unknown        Atherosclerosis of native artery of both lower extremities with intermittent claudication ([facility] ICD-10-CM: C41.917  ICD-9-CM: 440.21  1/25/2017 Unknown        PAD (peripheral artery disease) ([facility] ICD-10-CM: I73.9  ICD-9-CM: 443.9  1/25/2017 Unknown              CBC:  Lab Results   Component Value Date/Time    WBC 15.8 04/17/2017 05:11 AM    HGB 8.0 04/17/2017 05:11 AM    HCT 23.4 04/17/2017 05:11 AM    PLATELET 960 84/36/2128 05:11 AM    MCV 86.3 04/17/2017 05:11 AM        CMP:  Lab Results   Component Value Date/Time    Sodium 135 04/11/2017 04:34 AM    Potassium 3.8 04/11/2017 04:34 AM    Chloride 99 04/11/2017 04:34 AM    CO2 28 04/11/2017 04:34 AM    Anion gap 8 04/11/2017 04:34 AM    Glucose 120 04/11/2017 04:34 AM    BUN 16 04/11/2017 04:34 AM    Creatinine 1.00 04/11/2017 04:34 AM    BUN/Creatinine ratio 16 04/11/2017 04:34 AM    GFR est AA >60 04/11/2017 04:34 AM    GFR est non-AA >60 04/11/2017 04:34 AM    Calcium 8.0 04/11/2017 04:34 AM    AST (SGOT) 30 03/03/2017 12:00 AM    Alk.  phosphatase 329 03/03/2017 12:00 AM    Protein, total 6.8 03/03/2017 12:00 AM    Albumin 3.9 03/03/2017 12:00 AM    Globulin 3.7 12/29/2015 07:18 AM    A-G Ratio 1.3 03/03/2017 12:00 AM    ALT (SGPT) 25 03/03/2017 12:00 AM        PT/INR  Lab Results   Component Value Date/Time    INR 1.9 04/17/2017 05:11 AM    INR 1.8 04/16/2017 12:59 AM    INR 1.5 04/15/2017 12:51 AM    Prothrombin time 21.3 04/17/2017 05:11 AM    Prothrombin time 20.4 04/16/2017 12:59 AM    Prothrombin time 17.6 04/15/2017 12:51 AM            EKG:   Results for orders placed or performed in visit on 02/24/17   AMB POC EKG ROUTINE W/ 12 LEADS, INTER & REP     Status: None    Narrative    Atrial fibrillation. Cannot rule out anteroseptal myocardial infarction age-indeterminate.   Nonspecific ST-T changes          Plan:   AMS-    - patient is more alert and awake   - R/O CO2 narcosis   - Stop narcotic analgesics   - reorder labs , including ammonia     Afib RVR  - Cardioversion planned by surgery   - INR - 1.9   -     Anemia   - Follow H/H and transfuse if H/H below 7.5     MAYRA  - Improved - no recent labs     Add :  ABG and labs reviewed ( NH3 ) pending   No evidence of CO2 retention - how ever by the time ABG were done patient is back to his normal mentation     Signed By: Mary Grace Thompson MD     April 17, 2017

## 2017-04-17 NOTE — PROGRESS NOTES
[facility] - Progress Note  Admit Date: 4/4/2017    Assessment:     Hospital Problems  Date Reviewed: 4/4/2017          Codes Class Noted POA    A-fib [facility]) ICD-10-CM: I48.91  ICD-9-CM: 427.31  4/17/2017 Unknown        Aortoiliac occlusive disease ([facility] ICD-10-CM: F96.50  ICD-9-CM: 444.09  1/25/2017 Unknown        Atherosclerosis of native artery of both lower extremities with intermittent claudication ([facility] ICD-10-CM: H23.534  ICD-9-CM: 440.21  1/25/2017 Unknown        PAD (peripheral artery disease) ([facility] ICD-10-CM: I73.9  ICD-9-CM: 443.9  1/25/2017 Unknown              - A.fib. Persistent for past 2 months, HR 150s as outpatient and asymptomatic, HR still 100-120s on current regimen. - PAD, s/p aortoiliac occlusive disease s/p R axillary fem bypass and fem-pop 4/10/17. Now on Plavix, Lovenox/warfarin.  - Acute Kidney Injury. Resolved, Cr back to baseline.  - Cardiomyopathy, ischemic. ECHO 2/24/17 EF 35-40% mod. diffuse hypokinesis, no evidence of ADHF.  - s/p cath 3/15/17 with drug eluding stents to LCx and RCA. - Asymptomatic carotid artery disease.  - Essential Hypertension.  - Anemia, post-operatively, s/p 2 units PRBC. Plan: Will restart cardizem drip and continue PO medications as tolerated. Will keep NPO after MN for possible BERRY/DCCV tomorrow. Continue anticoagulation with warfarin. Subjective:     Oriented to place but not time.      Objective:      Patient Vitals for the past 8 hrs:   Temp Pulse Resp BP SpO2   04/17/17 0811 98.2 °F (36.8 °C) - 22 119/76 97 %   04/17/17 0423 98.6 °F (37 °C) (!) 134 22 129/75 93 %         Patient Vitals for the past 96 hrs:   Weight   04/17/17 0423 83.1 kg (183 lb 4.8 oz)   04/16/17 0400 85.8 kg (189 lb 1.6 oz)   04/15/17 0400 85 kg (187 lb 8 oz)   04/14/17 0530 84.9 kg (187 lb 3.6 oz)                    Intake/Output Summary (Last 24 hours) at 04/17/17 1056  Last data filed at 04/17/17 0711   Gross per 24 hour   Intake                0 ml Output             3650 ml   Net            -3650 ml       Physical Exam:  General:  confused  Neck:  no JVD  Lungs:  clear to auscultation bilaterally  Heart:  irregularly irregular rhythm  Abdomen:  abdomen is soft without significant tenderness, masses, organomegaly or guarding  Extremities:  Trace to 1+ edema RLE >LLE    Data Review:     Labs: Results:       Chemistry No results for input(s): GLU, NA, K, CL, CO2, BUN, CREA, CA, MG, PHOS, AGAP, BUCR, TBIL, GPT, AP, TP, ALB, GLOB, AGRAT in the last 72 hours. CBC w/Diff Recent Labs      04/17/17   0511   WBC  15.8*   RBC  2.71*   HGB  8.0*   HCT  23.4*   PLT  412      Cardiac Enzymes No results found for: CPK, CKMMB, CKMB, RCK3, CKMBT, CKNDX, CKND1, LEO, TROPT, TROIQ, DEWAYNE, TROPT, TNIPOC, BNP, BNPP   Coagulation Recent Labs      04/17/17   0511  04/16/17   0059   PTP  21.3*  20.4*   INR  1.9*  1.8*       Lipid Panel Lab Results   Component Value Date/Time    Cholesterol, total 157 12/29/2015 07:18 AM    HDL Cholesterol 28 12/29/2015 07:18 AM    LDL, calculated 112.4 12/29/2015 07:18 AM    VLDL, calculated 16.6 12/29/2015 07:18 AM    Triglyceride 83 12/29/2015 07:18 AM    CHOL/HDL Ratio 5.6 12/29/2015 07:18 AM      BNP No results found for: BNP, BNPP, XBNPT   Liver Enzymes No results for input(s): TP, ALB, TBIL, AP, SGOT, GPT in the last 72 hours.     No lab exists for component: DBIL   Digoxin    Thyroid Studies Lab Results   Component Value Date/Time    TSH 4.77 04/06/2017 05:30 AM          Signed By: JUNE Ruiz     April 17, 2017

## 2017-04-17 NOTE — PROGRESS NOTES
Pt lethargic and difficult to wake. VSS. Remains tachycardic  Per RN just had bath and very tired. Pt c/o sore throat. ? Possible BERRY cardioversion today  Rapid faint pulse in bypass graft. Hopefully cardioversion will help this. Continue to monitor closely. Check labs. Cbc noted. Will consult Medicine for assistance with medical care.      Elvia Harris  [phone]

## 2017-04-17 NOTE — PROGRESS NOTES
Resolved transition of care patient currently in hospital for cardiovascular problem new [place] will be opened upon D/C

## 2017-04-17 NOTE — PROGRESS NOTES
NUTRITION    BPA/MST Referral       RECOMMENDATIONS / PLAN:     - Resume po diet and supplements as mentation improves. If mentation does not improve, consider temporary enteral nutrition support. - Consider SLP evaluation as needed prior to resuming po diet. - Continue RD inpatient monitoring and evaluation. NUTRITION INTERVENTIONS & DIAGNOSIS:     [] Meals/Snacks: resume when appropriate  [] Medical food supplementation: resume when appropriate  [] Enteral Nutrition support    Nutrition Diagnosis:   Inadequate oral intake related to mentation as evidenced by patient lethargic and confused and unable to safely consume a po diet as this time. Increased nutrient needs related to wound healing as evidenced by surgical incisions and pressure ulcer. ASSESSMENT:     Subjective/Objective: Pt now confused and lethargic. Unable to safely consume po due to mentation. NPO for possible BERRY/cardioversion tomorrow.     Average po intake adequate to meet patients estimated nutritional needs:   [] Yes     [x] No   [] Unable to determine at this time    Diet: DIET NUTRITIONAL SUPPLEMENTS All Meals; ENSURE ENLIVE  DIET NPO With Meds      Food Allergies: NKFA  Current Appetite:   [] Good     [] Fair     [x] Poor     [] Other:  Appetite/meal intake prior to admission:   [] Good     [x] Fair     [] Poor     [] Other:  Feeding Limitations:  [] Swallowing difficulty    [] Chewing difficulty    [] Other:  Current Meal Intake: Patient Vitals for the past 100 hrs:   % Diet Eaten   04/17/17 1321 0 %   04/17/17 0811 0 %   04/16/17 1741 0 %   04/16/17 1239 0 %   04/16/17 0933 0 %   04/15/17 1906 75 %   04/15/17 1329 100 %   04/13/17 1739 50 %       BM: 4/16  Skin Integrity: surgical incision to right axilla, right groin, left groin, right leg; unstageable pressure ulcer to heel   Edema: 2+ genital, 2+3+ LLE, RLE   Pertinent Medications: Reviewed: cyanocobalamin     Recent Labs      04/17/17   1027   NA  138   K  4.0   CL  102 CO2  25   GLU  82   BUN  9   CREA  0.78   CA  8.6   ALB  2.3*   SGOT  60*   ALT  31       Intake/Output Summary (Last 24 hours) at 04/17/17 1359  Last data filed at 04/17/17 0711   Gross per 24 hour   Intake                0 ml   Output             3225 ml   Net            -3225 ml       Anthropometrics:  Ht Readings from Last 1 Encounters:   03/30/17 5' 10\" (1.778 m)     Last 3 Recorded Weights in this Encounter    04/15/17 0400 04/16/17 0400 04/17/17 0423   Weight: 85 kg (187 lb 8 oz) 85.8 kg (189 lb 1.6 oz) 83.1 kg (183 lb 4.8 oz)     Body mass index is 26.3 kg/(m^2). Weight History: reports weight loss from 190# over the last month. Weight Metrics 4/17/2017 4/4/2017 3/29/2017 3/17/2017 3/16/2017 3/8/2017 2/27/2017   Weight 183 lb 4.8 oz - 170 lb 170 lb 170 lb 9.6 oz 189 lb 179 lb   BMI - 26.3 kg/m2 24.39 kg/m2 24.39 kg/m2 24.48 kg/m2 27.12 kg/m2 25.68 kg/m2        Admitting Diagnosis: Atheroscler of native artery of both legs with intermit claudication ([facility] [I70.213]  Past Medical History:   Diagnosis Date    [facility]     Abnormal EKG     CAD S/P percutaneous coronary angioplasty 03/2017    Essential hypertension     Lumbar canal stenosis 05/04/2015    Dr. Hang Smith PVD (peripheral vascular disease) [facility])        Education Needs:        [x] None identified  [] Identified - Not appropriate at this time  []  Identified and addressed - refer to education log  Learning Limitations:   [] None identified  [x] Identified    Cultural, Buddhist & ethnic food preferences:  [x] None identified    [] Identified and addressed     ESTIMATED NUTRITION NEEDS:     Calories: 5289-9806 kcal (30-35 kcal/kg) based on  [x] Actual BW: 81 kg      [] IBW   Protein: 105-121 gm (1.3-1.5 gm/kg) based on  [x] Actual BW      [] IBW   Fluid: 1 mL/kcal     MONITORING & EVALUATION:     Nutrition Goal(s):   1. Po intake of meals will meet >75% of patient estimated nutritional needs within the next 7 days.   Outcome:  [] Met/Ongoing    [x]  Not Met    [] New/Initial Goal     Monitoring:   [x] Diet tolerance   [x] Meal intake   [x] Supplement intake   [] GI symptoms/ability to tolerate po diet   [] Respiratory status   [] Plan of care      Previous Recommendations (for follow-up assessments only):     [x]   Implemented       []   Not Implemented (RD to address)     [] No Recommendation Made     Discharge Planning: cardiac diet     [x] Participated in care planning, discharge planning, & interdisciplinary rounds as appropriate      Toño Peña, [address]    Pager: [phone]

## 2017-04-18 ENCOUNTER — APPOINTMENT (OUTPATIENT)
Dept: NON INVASIVE DIAGNOSTICS | Age: 57
DRG: 253 | End: 2017-04-18
Payer: COMMERCIAL

## 2017-04-18 ENCOUNTER — ANESTHESIA EVENT (OUTPATIENT)
Dept: NON INVASIVE DIAGNOSTICS | Age: 57
DRG: 253 | End: 2017-04-18
Payer: COMMERCIAL

## 2017-04-18 ENCOUNTER — ANESTHESIA (OUTPATIENT)
Dept: NON INVASIVE DIAGNOSTICS | Age: 57
DRG: 253 | End: 2017-04-18
Payer: COMMERCIAL

## 2017-04-18 PROBLEM — R40.4 TRANSIENT ALTERATION OF AWARENESS: Status: ACTIVE | Noted: 2017-04-18

## 2017-04-18 LAB
AMMONIA PLAS-SCNC: 61 UMOL/L (ref 11–32)
ANION GAP BLD CALC-SCNC: 9 MMOL/L (ref 3–18)
ATRIAL RATE: 81 BPM
BUN SERPL-MCNC: 12 MG/DL (ref 7–18)
BUN/CREAT SERPL: 16 (ref 12–20)
CALCIUM SERPL-MCNC: 8.4 MG/DL (ref 8.5–10.1)
CALCULATED P AXIS, ECG09: 59 DEGREES
CALCULATED R AXIS, ECG10: -3 DEGREES
CALCULATED T AXIS, ECG11: -92 DEGREES
CHLORIDE SERPL-SCNC: 101 MMOL/L (ref 100–108)
CO2 SERPL-SCNC: 26 MMOL/L (ref 21–32)
CREAT SERPL-MCNC: 0.76 MG/DL (ref 0.6–1.3)
DIAGNOSIS, 93000: NORMAL
ERYTHROCYTE [DISTWIDTH] IN BLOOD BY AUTOMATED COUNT: 15.8 % (ref 11.6–14.5)
GLUCOSE SERPL-MCNC: 86 MG/DL (ref 74–99)
HCT VFR BLD AUTO: 22.6 % (ref 36–48)
HGB BLD-MCNC: 7.7 G/DL (ref 13–16)
INR PPP: 1.8 (ref 0.8–1.2)
MCH RBC QN AUTO: 29.6 PG (ref 24–34)
MCHC RBC AUTO-ENTMCNC: 34.1 G/DL (ref 31–37)
MCV RBC AUTO: 86.9 FL (ref 74–97)
P-R INTERVAL, ECG05: 172 MS
PLATELET # BLD AUTO: 463 K/UL (ref 135–420)
PMV BLD AUTO: 9.1 FL (ref 9.2–11.8)
POTASSIUM SERPL-SCNC: 4.2 MMOL/L (ref 3.5–5.5)
PROTHROMBIN TIME: 19.9 SEC (ref 11.5–15.2)
Q-T INTERVAL, ECG07: 352 MS
QRS DURATION, ECG06: 88 MS
QTC CALCULATION (BEZET), ECG08: 408 MS
RBC # BLD AUTO: 2.6 M/UL (ref 4.7–5.5)
SODIUM SERPL-SCNC: 136 MMOL/L (ref 136–145)
VENTRICULAR RATE, ECG03: 81 BPM
WBC # BLD AUTO: 9.8 K/UL (ref 4.6–13.2)

## 2017-04-18 PROCEDURE — 74011000250 HC RX REV CODE- 250: Performed by: INTERNAL MEDICINE

## 2017-04-18 PROCEDURE — 80048 BASIC METABOLIC PNL TOTAL CA: CPT | Performed by: PHYSICIAN ASSISTANT

## 2017-04-18 PROCEDURE — 74011250637 HC RX REV CODE- 250/637: Performed by: SURGERY

## 2017-04-18 PROCEDURE — B24BZZ4 ULTRASONOGRAPHY OF HEART WITH AORTA, TRANSESOPHAGEAL: ICD-10-PCS | Performed by: SPECIALIST

## 2017-04-18 PROCEDURE — 85610 PROTHROMBIN TIME: CPT | Performed by: PHYSICIAN ASSISTANT

## 2017-04-18 PROCEDURE — 74011250637 HC RX REV CODE- 250/637: Performed by: INTERNAL MEDICINE

## 2017-04-18 PROCEDURE — 74011250636 HC RX REV CODE- 250/636

## 2017-04-18 PROCEDURE — 74011250636 HC RX REV CODE- 250/636: Performed by: SURGERY

## 2017-04-18 PROCEDURE — 93005 ELECTROCARDIOGRAM TRACING: CPT

## 2017-04-18 PROCEDURE — 74011250637 HC RX REV CODE- 250/637: Performed by: PHYSICIAN ASSISTANT

## 2017-04-18 PROCEDURE — 74011250636 HC RX REV CODE- 250/636: Performed by: INTERNAL MEDICINE

## 2017-04-18 PROCEDURE — 5A2204Z RESTORATION OF CARDIAC RHYTHM, SINGLE: ICD-10-PCS | Performed by: INTERNAL MEDICINE

## 2017-04-18 PROCEDURE — 65660000004 HC RM CVT STEPDOWN

## 2017-04-18 PROCEDURE — 74011000250 HC RX REV CODE- 250

## 2017-04-18 PROCEDURE — A6209 FOAM DRSG <=16 SQ IN W/O BDR: HCPCS

## 2017-04-18 PROCEDURE — 74011000250 HC RX REV CODE- 250: Performed by: PHYSICIAN ASSISTANT

## 2017-04-18 PROCEDURE — 36415 COLL VENOUS BLD VENIPUNCTURE: CPT | Performed by: PHYSICIAN ASSISTANT

## 2017-04-18 PROCEDURE — 85027 COMPLETE CBC AUTOMATED: CPT | Performed by: PHYSICIAN ASSISTANT

## 2017-04-18 PROCEDURE — 74011250636 HC RX REV CODE- 250/636: Performed by: PHYSICIAN ASSISTANT

## 2017-04-18 PROCEDURE — 74011000258 HC RX REV CODE- 258: Performed by: SURGERY

## 2017-04-18 PROCEDURE — 74011000258 HC RX REV CODE- 258: Performed by: PHYSICIAN ASSISTANT

## 2017-04-18 PROCEDURE — 82140 ASSAY OF AMMONIA: CPT | Performed by: INTERNAL MEDICINE

## 2017-04-18 PROCEDURE — 96374 THER/PROPH/DIAG INJ IV PUSH: CPT

## 2017-04-18 RX ORDER — WARFARIN SODIUM 5 MG/1
2.5 TABLET ORAL ONCE
Status: COMPLETED | OUTPATIENT
Start: 2017-04-18 | End: 2017-04-18

## 2017-04-18 RX ORDER — LIDOCAINE HYDROCHLORIDE 20 MG/ML
INJECTION, SOLUTION EPIDURAL; INFILTRATION; INTRACAUDAL; PERINEURAL AS NEEDED
Status: DISCONTINUED | OUTPATIENT
Start: 2017-04-18 | End: 2017-04-18 | Stop reason: HOSPADM

## 2017-04-18 RX ORDER — PROPOFOL 10 MG/ML
INJECTION, EMULSION INTRAVENOUS AS NEEDED
Status: DISCONTINUED | OUTPATIENT
Start: 2017-04-18 | End: 2017-04-18 | Stop reason: HOSPADM

## 2017-04-18 RX ORDER — PROPOFOL 10 MG/ML
INJECTION, EMULSION INTRAVENOUS
Status: DISPENSED
Start: 2017-04-18 | End: 2017-04-18

## 2017-04-18 RX ADMIN — METOPROLOL TARTRATE 5 MG: 5 INJECTION INTRAVENOUS at 17:01

## 2017-04-18 RX ADMIN — PIPERACILLIN SODIUM,TAZOBACTAM SODIUM 3.38 G: 3; .375 INJECTION, POWDER, FOR SOLUTION INTRAVENOUS at 17:06

## 2017-04-18 RX ADMIN — DILTIAZEM HYDROCHLORIDE 240 MG: 240 CAPSULE, COATED, EXTENDED RELEASE ORAL at 12:05

## 2017-04-18 RX ADMIN — PROPOFOL 100 MG: 10 INJECTION, EMULSION INTRAVENOUS at 10:28

## 2017-04-18 RX ADMIN — DOCUSATE SODIUM 100 MG: 100 CAPSULE, LIQUID FILLED ORAL at 12:09

## 2017-04-18 RX ADMIN — LIDOCAINE HYDROCHLORIDE 40 MG: 20 INJECTION, SOLUTION EPIDURAL; INFILTRATION; INTRACAUDAL; PERINEURAL at 10:23

## 2017-04-18 RX ADMIN — METOPROLOL TARTRATE 5 MG: 5 INJECTION INTRAVENOUS at 08:00

## 2017-04-18 RX ADMIN — THERA TABS 1 TABLET: TAB at 12:05

## 2017-04-18 RX ADMIN — METOPROLOL TARTRATE 5 MG: 5 INJECTION INTRAVENOUS at 12:06

## 2017-04-18 RX ADMIN — PIPERACILLIN SODIUM,TAZOBACTAM SODIUM 3.38 G: 3; .375 INJECTION, POWDER, FOR SOLUTION INTRAVENOUS at 05:00

## 2017-04-18 RX ADMIN — AMIODARONE HYDROCHLORIDE 1 MG/MIN: 1.8 INJECTION, SOLUTION INTRAVENOUS at 10:43

## 2017-04-18 RX ADMIN — PIPERACILLIN SODIUM,TAZOBACTAM SODIUM 3.38 G: 3; .375 INJECTION, POWDER, FOR SOLUTION INTRAVENOUS at 12:24

## 2017-04-18 RX ADMIN — AMIODARONE HYDROCHLORIDE 1 MG/MIN: 1.8 INJECTION, SOLUTION INTRAVENOUS at 16:53

## 2017-04-18 RX ADMIN — ENOXAPARIN SODIUM 80 MG: 80 INJECTION, SOLUTION INTRAVENOUS; SUBCUTANEOUS at 23:00

## 2017-04-18 RX ADMIN — WARFARIN SODIUM 2.5 MG: 5 TABLET ORAL at 18:00

## 2017-04-18 RX ADMIN — ENOXAPARIN SODIUM 80 MG: 80 INJECTION, SOLUTION INTRAVENOUS; SUBCUTANEOUS at 12:18

## 2017-04-18 RX ADMIN — PROPOFOL 50 MG: 10 INJECTION, EMULSION INTRAVENOUS at 10:31

## 2017-04-18 RX ADMIN — Medication 1000 MCG: at 12:05

## 2017-04-18 RX ADMIN — DIGOXIN 125 MCG: 250 INJECTION, SOLUTION INTRAMUSCULAR; INTRAVENOUS at 12:06

## 2017-04-18 RX ADMIN — METOPROLOL TARTRATE 5 MG: 5 INJECTION INTRAVENOUS at 05:00

## 2017-04-18 RX ADMIN — DIPHENHYDRAMINE HYDROCHLORIDE 12.5 MG: 50 INJECTION, SOLUTION INTRAMUSCULAR; INTRAVENOUS at 04:47

## 2017-04-18 RX ADMIN — SODIUM CHLORIDE 10 MG/HR: 900 INJECTION, SOLUTION INTRAVENOUS at 04:49

## 2017-04-18 RX ADMIN — ATORVASTATIN CALCIUM 40 MG: 40 TABLET, FILM COATED ORAL at 12:05

## 2017-04-18 RX ADMIN — PIPERACILLIN SODIUM,TAZOBACTAM SODIUM 3.38 G: 3; .375 INJECTION, POWDER, FOR SOLUTION INTRAVENOUS at 23:00

## 2017-04-18 RX ADMIN — PROPOFOL 100 MG: 10 INJECTION, EMULSION INTRAVENOUS at 10:23

## 2017-04-18 RX ADMIN — Medication 2 MG: at 02:30

## 2017-04-18 RX ADMIN — DOCUSATE SODIUM 100 MG: 100 CAPSULE, LIQUID FILLED ORAL at 17:00

## 2017-04-18 RX ADMIN — Medication 2 MG: at 04:46

## 2017-04-18 RX ADMIN — Medication 10 ML: at 05:00

## 2017-04-18 RX ADMIN — CLOPIDOGREL 75 MG: 75 TABLET, FILM COATED ORAL at 12:05

## 2017-04-18 RX ADMIN — Medication 10 ML: at 17:07

## 2017-04-18 NOTE — PROGRESS NOTES
[facility]  -  Progress Note      Patient: Meredith Knight MRN: [id number]  SSN: [id number]    YOB: 1960  Age: 59 y.o. Sex: male      Admit Date: 2017    Hospital Problem List:     Hospital Problems  Date Reviewed: 2017          Codes Class Noted POA    Transient alteration of awareness ICD-10-CM: R40.4  ICD-9-CM: 780.02  2017 Unknown        A-fib [facility]) ICD-10-CM: I48.91  ICD-9-CM: 427.31  2017 Unknown        Aortoiliac occlusive disease ([facility] ICD-10-CM: X56.81  ICD-9-CM: 444.09  2017 Unknown        Atherosclerosis of native artery of both lower extremities with intermittent claudication ([facility] ICD-10-CM: G75.868  ICD-9-CM: 440.21  2017 Unknown        PAD (peripheral artery disease) ([facility] ICD-10-CM: I73.9  ICD-9-CM: 443.9  2017 Unknown            - A.fib. Persistent for past 2 months, HR 150s as outpatient and asymptomatic, HR still 100-120s on current regimen. BERRY cardioversion today. - PAD, s/p aortoiliac occlusive disease s/p R axillary fem bypass and fem-pop 4/10/17. Now on Plavix, Lovenox/warfarin.  - Acute Kidney Injury. Resolved, Cr back to baseline.  - Cardiomyopathy, ischemic. ECHO 17 EF 35-40% mod. diffuse hypokinesis, no evidence of ADHF.  - s/p cath 3/15/17 with drug eluding stents to LCx and RCA. - Asymptomatic carotid artery disease.  - Essential Hypertension.  - Anemia, post-operatively, s/p 2 units PRBC. Plan:     -BERRY cardioversion this morning. Pt is alert and oriented x 3 this morning. Able to consent (signed and in his chart). -Keep NPO until BERRY.  -Continue current regimen for now. Subjective:     Patient is pleasant and talkative. Continues to understand he gets confused at times. He is oriented this morning. Able to tell me his name, , where he is currently and why, and what the day of the week is.     Objective:      Patient Vitals for the past 8 hrs:   Temp Pulse Resp BP SpO2   17 0755 98.7 °F (37.1 °C) (!) 101 18 118/73 99 %   04/18/17 0419 97.9 °F (36.6 °C) 96 18 115/62 98 %         Patient Vitals for the past 96 hrs:   Weight   04/18/17 0419 83.2 kg (183 lb 6.8 oz)   04/17/17 0423 83.1 kg (183 lb 4.8 oz)   04/16/17 0400 85.8 kg (189 lb 1.6 oz)   04/15/17 0400 85 kg (187 lb 8 oz)         Intake/Output Summary (Last 24 hours) at 04/18/17 0906  Last data filed at 04/17/17 1758   Gross per 24 hour   Intake              240 ml   Output                0 ml   Net              240 ml       Physical Exam:  General:  Awake, alert, oriented x3  Neck:  Supple, no jvd   Lungs:  Clear to auscultation bilaterally  Heart:  Tachy, irregular rhythm  Abdomen:  Soft, non-tender  Extremities: RLE with dressings intact, LLE atraumatic, no edema bilat    Data Review:     Labs: Results:       Chemistry Recent Labs      04/18/17   0530  04/17/17   1027   GLU  86  82   NA  136  138   K  4.2  4.0   CL  101  102   CO2  26  25   BUN  12  9   CREA  0.76  0.78   CA  8.4*  8.6   AGAP  9  11   BUCR  16  12   AP   --   655*   TP   --   7.1   ALB   --   2.3*   GLOB   --   4.8*   AGRAT   --   0.5*      CBC w/Diff Recent Labs      04/18/17   0530  04/17/17   0511   WBC  9.8  15.8*   RBC  2.60*  2.71*   HGB  7.7*  8.0*   HCT  22.6*  23.4*   PLT  463*  412      Cardiac Enzymes No results found for: CPK, CKMMB, CKMB, RCK3, CKMBT, CKNDX, CKND1, LEO, TROPT, TROIQ, DEWAYNE, TROPT, TNIPOC, BNP, BNPP   Coagulation Recent Labs      04/18/17   0530  04/17/17   0511   PTP  19.9*  21.3*   INR  1.8*  1.9*       Lipid Panel Lab Results   Component Value Date/Time    Cholesterol, total 157 12/29/2015 07:18 AM    HDL Cholesterol 28 12/29/2015 07:18 AM    LDL, calculated 112.4 12/29/2015 07:18 AM    VLDL, calculated 16.6 12/29/2015 07:18 AM    Triglyceride 83 12/29/2015 07:18 AM    CHOL/HDL Ratio 5.6 12/29/2015 07:18 AM      BNP No results found for: BNP, BNPP, XBNPT   Liver Enzymes Recent Labs      04/17/17   1027   TP  7.1   ALB  2.3*   AP  655*   SGOT  60* Digoxin    Thyroid Studies Lab Results   Component Value Date/Time    TSH 4.77 04/06/2017 05:30 AM            Signed By: JUNE Fierro     April 18, 2017

## 2017-04-18 NOTE — PROGRESS NOTES
Problem: Mobility Impaired (Adult and Pediatric)  Goal: *Acute Goals and Plan of Care (Insert Text)  STGs to be addressed within 3 days:  1. Bed mobility: Supine to sit to supine min/CGA with HR for meals. 2. Activity tolerance: Tolerate up in chair 1-2 hrs for ADLs. 3. Transfers: Sit to stand to chair mod/min A with LRAD for ADLs. LTGs to be addressed within 7 days:  1. Standing/Ambulation Balance: Increase to Good with LRAD for safe transfers and gait. 2. Ambulation: Ambulate > 100 ft. CGA with LRAD for home mobility. 3. Transfers: Sit to stand min/CGA with LRAD for ADLs  4. Stairs: Up/Down 2 steps CGA with HR for home entry. Will continue to hold treatment today. Patient scheduled for BERRY/cardioversion today. Will follow-up pending outcomes of procedure.

## 2017-04-18 NOTE — PROGRESS NOTES
[facility]  [facility]    Patient: Isai Buck Age: 59 y.o. : 1960 MR#: [id number] SSN: [id number]  Date/Time: 2017     Subjective:     Alert and awake and oriented       Review of systems  Patient denies Fever , Chills , Weight loss or Weight Gain , No SOB, No Cough , no Hemoptysis , No Chest pains , No Palpitations , No NVD , no Focal deficit . Assessment/Plan:   1. AMS   2 HTN  3 Anemia -     PLAN   - AMS recovered , no further work up   - Continue current BP meds   - Anemia - Follow up H/H - defer to surgery for BT   - Call us if needed . - Will S/o       Case discussed with:  [x]Patient  []Family  []Nursing  []Case Management  DVT Prophylaxis:  [x]Lovenox  []Hep SQ  []SCDs  []Coumadin   []On Heparin gtt    Objective:   VS:   Visit Vitals    /71    Pulse 80    Temp 97.7 °F (36.5 °C)    Resp 22    Ht 5' 10\" (1.778 m)    Wt 83.2 kg (183 lb 6.8 oz)    SpO2 98%    BMI 26.32 kg/m2      Tmax/24hrs: Temp (24hrs), Av.2 °F (36.8 °C), Min:97.7 °F (36.5 °C), Max:98.7 °F (37.1 °C)  IOBRIEF  Intake/Output Summary (Last 24 hours) at 17 1315  Last data filed at 17 1031   Gross per 24 hour   Intake              440 ml   Output                0 ml   Net              440 ml       General:  Alert, cooperative, no acute distress    HEENT:  NC, Atraumatic. PERRLA, anicteric sclerae. Pulmonary:  CTA Bilaterally. No Wheezing/Rhonchi/Rales. Cardiovascular: Regular rate and Rhythm. GI:  Soft, Non distended, Non tender. + Bowel sounds. Psych:  Not anxious or agitated. Neurologic: Grossly - Motor and Sensory functions are intact .  No Anxiety , calm , no Agitation         Medications:   Current Facility-Administered Medications   Medication Dose Route Frequency    propofol (DIPRIVAN) 10 mg/mL injection        amiodarone (NEXTERONE) 360 mg in dextrose 200 mL (1.8 mg/mL) infusion  1 mg/min IntraVENous TITRATE    dilTIAZem (CARDIZEM) 100 mg in 0.9% sodium chloride (MBP/ADV) 100 mL infusion  5 mg/hr IntraVENous CONTINUOUS    digoxin (LANOXIN) injection 125 mcg  125 mcg IntraVENous DAILY    metoprolol (LOPRESSOR) injection 5 mg  5 mg IntraVENous Q4H    piperacillin-tazobactam (ZOSYN) 3.375 g in 0.9% sodium chloride (MBP/ADV) 100 mL MBP  3.375 g IntraVENous Q6H    LORazepam (ATIVAN) tablet 1 mg  1 mg Oral Q1H PRN    Or    LORazepam (ATIVAN) injection 1 mg  1 mg IntraVENous Q1H PRN    LORazepam (ATIVAN) tablet 2 mg  2 mg Oral Q1H PRN    Or    LORazepam (ATIVAN) injection 2 mg  2 mg IntraVENous Q1H PRN    LORazepam (ATIVAN) injection 3 mg  3 mg IntraVENous Q15MIN PRN    dilTIAZem CD (CARDIZEM CD) capsule 240 mg  240 mg Oral DAILY    WARFARIN INFORMATION NOTE (COUMADIN)   Other Q24H    enoxaparin (LOVENOX) injection 80 mg  80 mg SubCUTAneous Q12H    therapeutic multivitamin (THERAGRAN) tablet 1 Tab  1 Tab Oral DAILY    sodium chloride (NS) flush 5-10 mL  5-10 mL IntraVENous PRN    glucose chewable tablet 16 g  4 Tab Oral PRN    glucagon (GLUCAGEN) injection 1 mg  1 mg IntraMUSCular PRN    dextrose (D50W) injection syrg 12.5-25 g  25-50 mL IntraVENous PRN    fentaNYL citrate (PF) injection 50 mcg  50 mcg IntraVENous Q10MIN PRN    atorvastatin (LIPITOR) tablet 40 mg  40 mg Oral DAILY    clopidogrel (PLAVIX) tablet 75 mg  75 mg Oral DAILY    cyanocobalamin tablet 1,000 mcg  1,000 mcg Oral DAILY    sodium chloride (NS) flush 5-10 mL  5-10 mL IntraVENous Q8H    sodium chloride (NS) flush 5-10 mL  5-10 mL IntraVENous PRN    morphine injection 2 mg  2 mg IntraVENous Q2H PRN    naloxone (NARCAN) injection 0.4 mg  0.4 mg IntraVENous PRN    ondansetron (ZOFRAN) injection 4 mg  4 mg IntraVENous Q4H PRN    diphenhydrAMINE (BENADRYL) injection 12.5 mg  12.5 mg IntraVENous Q4H PRN    docusate sodium (COLACE) capsule 100 mg  100 mg Oral BID       Labs:    Recent Results (from the past 24 hour(s))   POC G3    Collection Time: 04/17/17  2:39 PM Result Value Ref Range    Device: ROOM AIR      FIO2 (POC) 0.21 %    pH (POC) 7.525 (H) 7.35 - 7.45      pCO2 (POC) 28.5 (L) 35.0 - 45.0 MMHG    pO2 (POC) 56 (L) 80 - 100 MMHG    HCO3 (POC) 23.6 22 - 26 MMOL/L    sO2 (POC) 92 92 - 97 %    Base excess (POC) 1 mmol/L    Allens test (POC) YES      Total resp.  rate 16      Site LEFT RADIAL      Specimen type (POC) ARTERIAL      Performed by Aruna Hunt    Collection Time: 04/18/17  2:30 AM   Result Value Ref Range    Ammonia 61 (H) 11 - 32 UMOL/L   PROTHROMBIN TIME + INR    Collection Time: 04/18/17  5:30 AM   Result Value Ref Range    Prothrombin time 19.9 (H) 11.5 - 15.2 sec    INR 1.8 (H) 0.8 - 1.2     CBC W/O DIFF    Collection Time: 04/18/17  5:30 AM   Result Value Ref Range    WBC 9.8 4.6 - 13.2 K/uL    RBC 2.60 (L) 4.70 - 5.50 M/uL    HGB 7.7 (L) 13.0 - 16.0 g/dL    HCT 22.6 (L) 36.0 - 48.0 %    MCV 86.9 74.0 - 97.0 FL    MCH 29.6 24.0 - 34.0 PG    MCHC 34.1 31.0 - 37.0 g/dL    RDW 15.8 (H) 11.6 - 14.5 %    PLATELET 875 (H) 714 - 420 K/uL    MPV 9.1 (L) 9.2 - 69.2 FL   METABOLIC PANEL, BASIC    Collection Time: 04/18/17  5:30 AM   Result Value Ref Range    Sodium 136 136 - 145 mmol/L    Potassium 4.2 3.5 - 5.5 mmol/L    Chloride 101 100 - 108 mmol/L    CO2 26 21 - 32 mmol/L    Anion gap 9 3.0 - 18 mmol/L    Glucose 86 74 - 99 mg/dL    BUN 12 7.0 - 18 MG/DL    Creatinine 0.76 0.6 - 1.3 MG/DL    BUN/Creatinine ratio 16 12 - 20      GFR est AA >60 >60 ml/min/1.73m2    GFR est non-AA >60 >60 ml/min/1.73m2    Calcium 8.4 (L) 8.5 - 10.1 MG/DL   EKG, 12 LEAD, SUBSEQUENT    Collection Time: 04/18/17 11:06 AM   Result Value Ref Range    Ventricular Rate 81 BPM    Atrial Rate 81 BPM    P-R Interval 172 ms    QRS Duration 88 ms    Q-T Interval 352 ms    QTC Calculation (Bezet) 408 ms    Calculated P Axis 59 degrees    Calculated R Axis -3 degrees    Calculated T Axis -92 degrees    Diagnosis       Normal sinus rhythm  Septal infarct , age undetermined  ST & T wave abnormality, consider lateral ischemia  Abnormal ECG    Confirmed by Stoney Ibrahim MD, Christina Hampton ([id number]) on 4/18/2017 1:00:49 PM         Signed By: Mike Hernandez MD     April 18, 2017

## 2017-04-18 NOTE — PROGRESS NOTES
NUTRITION    BPA/MST Referral       RECOMMENDATIONS / PLAN:     - Continue with current nutrition interventions. - Continue RD inpatient monitoring and evaluation. NUTRITION INTERVENTIONS & DIAGNOSIS:     [x] Meals/Snacks:  Modified diet  [x] Medical food supplementation: Ensure Enlive TID    Nutrition Diagnosis:   Inadequate oral intake related to mentation as evidenced by patient lethargic and confused and unable to safely consume a po diet as this time. Increased nutrient needs related to wound healing as evidenced by surgical incisions and pressure ulcer. ASSESSMENT:     Subjective/Objective: Pt was off unit and NPO for procedure today at time of visit. Per MD note, pt alert, awake, and oriented. Diet and supplements resumed following procedure. Per chart, pt consumed 98% of lunch meal today.  No need for enteral nutrition support at this time; will continue to monitor    Average po intake adequate to meet patients estimated nutritional needs:   [] Yes     [x] No   [] Unable to determine at this time    Diet: DIET NUTRITIONAL SUPPLEMENTS All Meals; ENSURE ENLIVE  DIET CARDIAC Regular; 2 GM NA (House Low NA)      Food Allergies: NKFA  Current Appetite:   [] Good     [] Fair     [x] Poor     [] Other:  Appetite/meal intake prior to admission:   [] Good     [x] Fair     [] Poor     [] Other:  Feeding Limitations:  [] Swallowing difficulty    [] Chewing difficulty    [] Other:  Current Meal Intake:   Patient Vitals for the past 100 hrs:   % Diet Eaten   04/18/17 1329 98 %   04/17/17 1321 0 %   04/17/17 0811 0 %   04/16/17 1741 0 %   04/16/17 1239 0 %   04/16/17 0933 0 %   04/15/17 1906 75 %   04/15/17 1329 100 %       BM: 4/16  Skin Integrity: surgical incision to right axilla, right groin, left groin, right leg; unstageable pressure ulcer to heel   Edema: 2+ genital, 2+3+ LLE, RLE   Pertinent Medications: Reviewed: cyanocobalamin     Recent Labs      04/18/17   0530  04/17/17   1027   NA  136  138   K 4.2  4.0   CL  101  102   CO2  26  25   GLU  86  82   BUN  12  9   CREA  0.76  0.78   CA  8.4*  8.6   ALB   --   2.3*   SGOT   --   60*   ALT   --   31       Intake/Output Summary (Last 24 hours) at 04/18/17 1457  Last data filed at 04/18/17 1329   Gross per 24 hour   Intake              680 ml   Output                0 ml   Net              680 ml       Anthropometrics:  Ht Readings from Last 1 Encounters:   03/30/17 5' 10\" (1.778 m)     Last 3 Recorded Weights in this Encounter    04/16/17 0400 04/17/17 0423 04/18/17 0419   Weight: 85.8 kg (189 lb 1.6 oz) 83.1 kg (183 lb 4.8 oz) 83.2 kg (183 lb 6.8 oz)     Body mass index is 26.32 kg/(m^2). Weight History: reports weight loss from 190# over the last month. Weight Metrics 4/18/2017 4/4/2017 3/29/2017 3/17/2017 3/16/2017 3/8/2017 2/27/2017   Weight 183 lb 6.8 oz - 170 lb 170 lb 170 lb 9.6 oz 189 lb 179 lb   BMI - 26.32 kg/m2 24.39 kg/m2 24.39 kg/m2 24.48 kg/m2 27.12 kg/m2 25.68 kg/m2        Admitting Diagnosis: Atheroscler of native artery of both legs with intermit claudication ([facility] [I70.213]  Past Medical History:   Diagnosis Date    A-fib ([facility]     Abnormal EKG     CAD S/P percutaneous coronary angioplasty 03/2017    Essential hypertension     Lumbar canal stenosis 05/04/2015    Dr. Jet Gamboa PVD (peripheral vascular disease) [facility])        Education Needs:        [x] None identified  [] Identified - Not appropriate at this time  []  Identified and addressed - refer to education log  Learning Limitations:   [] None identified  [x] Identified    Cultural, Sikhism & ethnic food preferences:  [x] None identified    [] Identified and addressed     ESTIMATED NUTRITION NEEDS:     Calories: 7400-9488 kcal (30-35 kcal/kg) based on  [x] Actual BW: 81 kg      [] IBW   Protein: 105-121 gm (1.3-1.5 gm/kg) based on  [x] Actual BW      [] IBW   Fluid: 1 mL/kcal     MONITORING & EVALUATION:     Nutrition Goal(s):   1.  Po intake of meals will meet >75% of patient estimated nutritional needs within the next 7 days.   Outcome:  [] Met/Ongoing    [x]  Not Met; Progressing    [] New/Initial Goal     Monitoring:   [x] Diet tolerance   [x] Meal intake   [x] Supplement intake   [] GI symptoms/ability to tolerate po diet   [] Respiratory status   [] Plan of care      Previous Recommendations (for follow-up assessments only):     [x]   Implemented       []   Not Implemented (RD to address)     [] No Recommendation Made     Discharge Planning: cardiac diet     [x] Participated in care planning, discharge planning, & interdisciplinary rounds as appropriate      Jazmin Duke, [address] [place] [address]  Pager: [phone]

## 2017-04-18 NOTE — CDMP QUERY
Please clarify if this patient is being treated/managed for:    =>Encephalopathy in the setting of altered mental status. Please specify as toxic, metabolic etc.   =>Other Explanation of clinical findings  =>Unable to Determine (no explanation of clinical findings)    The medical record reflects the following:    Risk: age, h/o alcohol abuse, MAYRA, PVD    Clinical Indicators: Per 4/15-16 notes, patient agitated an confused, pulling at IVs, dressing, clothing - order obtained for sitter. Per 4/16 Card note: Patient with worsening delirium past 2 days, unclear etiology. ..DTs, agitated/confusion at night x2, given sedation and restraints added for safely d/t pulling out lines. Per IM consult for AMS: r/o CO2 narcosis, stop narcotic analgesics, reorder labs including ammonia    Treatment: Medical Consult for AMS, sitter, restraints, lab, ABGs    Please clarify and document your clinical opinion in the progress notes and discharge summary including the definitive and/or presumptive diagnosis, (suspected or probable), related to the above clinical findings. Please include clinical findings supporting your diagnosis. If you DECLINE this query or would like to communicate with [facility], please utilize the \"[facility] message box\" at the TOP of the Progress Note on the right.       Thank you,    Lito Whitaker [phone]  Carol Pearce  [address] [phone]

## 2017-04-18 NOTE — ANESTHESIA POSTPROCEDURE EVALUATION
Post-Anesthesia Evaluation and Assessment    Patient: Gen Gonzales MRN: [id number]  SSN: [id number]    YOB: 1960  Age: 59 y.o. Sex: male       Cardiovascular Function/Vital Signs  Visit Vitals    /56    Pulse 77    Temp 37 °C (98.6 °F)    Resp 16    Ht 5' 10\" (1.778 m)    Wt 83.2 kg (183 lb 6.8 oz)    SpO2 97%    BMI 26.32 kg/m2       Patient is status post No value filed. anesthesia for * No procedures listed *. Nausea/Vomiting: None    Postoperative hydration reviewed and adequate. Pain:  Pain Scale 1: Numeric (0 - 10) (04/16/17 1600)  Pain Intensity 1: 0 (04/16/17 1600)   Managed    Neurological Status:   Neuro (WDL): Within Defined Limits (04/04/17 0727)  Neuro  Neurologic State: Eyes open to stimulus; Restless;Sleeping (04/17/17 0800)  Orientation Level: Disoriented X4 (04/17/17 0800)  Cognition: Appropriate decision making (04/17/17 0800)  Speech: Slurred (04/17/17 0800)   At baseline    Mental Status and Level of Consciousness: Arousable    Pulmonary Status:   O2 Device: Nasal cannula (04/18/17 1032)   Adequate oxygenation and airway patent    Complications related to anesthesia: None    Post-anesthesia assessment completed.  No concerns        Signed By: Hemalatha Chávez MD     April 18, 2017

## 2017-04-18 NOTE — ANESTHESIA PREPROCEDURE EVALUATION
Anesthetic History   No history of anesthetic complications            Review of Systems / Medical History  Patient summary reviewed and pertinent labs reviewed    Pulmonary    COPD               Neuro/Psych   Within defined limits           Cardiovascular    Hypertension      CHF: NYHA Classification I, dyspnea on exertion  Dysrhythmias : atrial fibrillation  CAD    Exercise tolerance: <4 METS     GI/Hepatic/Renal  Within defined limits              Endo/Other        Arthritis     Other Findings   Comments: Male  No h/o ponv  No narcotics given  No h/o motion sickness         Physical Exam    Airway  Mallampati: II  TM Distance: 4 - 6 cm  Neck ROM: normal range of motion   Mouth opening: Normal     Cardiovascular    Rhythm: irregular  Rate: normal         Dental    Dentition: Poor dentition     Pulmonary  Breath sounds clear to auscultation               Abdominal  GI exam deferred       Other Findings            Anesthetic Plan    ASA: 4  Anesthesia type: MAC          Induction: Intravenous  Anesthetic plan and risks discussed with: Patient

## 2017-04-18 NOTE — PROCEDURES
Procedures:  1. Synchronized external cardioversion. 2.  MAC sedation with propofol and anesthesia assistance. 3.  Transesophageal echocardiogram prior to procedure without evidence of left atrial appendage thrombus. Diagnosis:  1. Atrial fibrillation. Details:  After informed consent was obtained the patient was adequately sedated. Transesophageal echocardiogram prior to procedure was without evidence of left atrial appendage thrombus. See separate report for details. Initial rhythm was atrial fibrillation. A 200J external synchronized cardioversion was performed with restoration of sinus rhythm a 75 bpm.  The patient awoke without complication. After awakening there were intermittent episodes of A.fib. Plan:  -Start amiodarone drip/load. High chance for recurrence given atrial size.   -Continue Coumadin with goal INR 2-3, lovenox bridging.  -Decrease diltiazem drip  To 5 mg/hr.

## 2017-04-18 NOTE — ROUTINE PROCESS
TRANSFER - OUT REPORT:    Verbal report given to Aida RN(name) on Anson King  being transferred to CVT Stepdown, 2306(unit) for routine progression of care       Report consisted of patients Situation, Background, Assessment and   Recommendations(SBAR). Information from the following report(s) SBAR, Procedure Summary and MAR was reviewed with the receiving nurse. Lines:   Peripheral IV 04/11/17 Left Hand (Active)   Site Assessment Clean, dry, & intact 4/17/2017  4:00 PM   Phlebitis Assessment 0 4/17/2017  4:00 PM   Infiltration Assessment 0 4/17/2017  4:00 PM   Dressing Status Clean, dry, & intact 4/17/2017  4:00 PM   Dressing Type Tape;Transparent 4/17/2017  4:00 PM   Hub Color/Line Status Blue 4/17/2017  4:00 PM   Alcohol Cap Used No 4/17/2017  4:00 PM        Opportunity for questions and clarification was provided.       Patient transported with:   [employer]

## 2017-04-19 LAB
ERYTHROCYTE [DISTWIDTH] IN BLOOD BY AUTOMATED COUNT: 15.7 % (ref 11.6–14.5)
HCT VFR BLD AUTO: 23.7 % (ref 36–48)
HGB BLD-MCNC: 8.2 G/DL (ref 13–16)
INR PPP: 2 (ref 0.8–1.2)
MCH RBC QN AUTO: 30.1 PG (ref 24–34)
MCHC RBC AUTO-ENTMCNC: 34.6 G/DL (ref 31–37)
MCV RBC AUTO: 87.1 FL (ref 74–97)
PLATELET # BLD AUTO: 406 K/UL (ref 135–420)
PMV BLD AUTO: 9.2 FL (ref 9.2–11.8)
PROTHROMBIN TIME: 21.4 SEC (ref 11.5–15.2)
RBC # BLD AUTO: 2.72 M/UL (ref 4.7–5.5)
WBC # BLD AUTO: 9.6 K/UL (ref 4.6–13.2)

## 2017-04-19 PROCEDURE — 74011000250 HC RX REV CODE- 250: Performed by: INTERNAL MEDICINE

## 2017-04-19 PROCEDURE — 74011250636 HC RX REV CODE- 250/636: Performed by: ANESTHESIOLOGY

## 2017-04-19 PROCEDURE — 74011000258 HC RX REV CODE- 258: Performed by: SURGERY

## 2017-04-19 PROCEDURE — 74011250636 HC RX REV CODE- 250/636: Performed by: SURGERY

## 2017-04-19 PROCEDURE — 85027 COMPLETE CBC AUTOMATED: CPT | Performed by: PHYSICIAN ASSISTANT

## 2017-04-19 PROCEDURE — 77030012935 HC DRSG AQUACEL BMS -B

## 2017-04-19 PROCEDURE — 85610 PROTHROMBIN TIME: CPT | Performed by: PHYSICIAN ASSISTANT

## 2017-04-19 PROCEDURE — 36415 COLL VENOUS BLD VENIPUNCTURE: CPT | Performed by: PHYSICIAN ASSISTANT

## 2017-04-19 PROCEDURE — 74011250636 HC RX REV CODE- 250/636: Performed by: INTERNAL MEDICINE

## 2017-04-19 PROCEDURE — 74011250637 HC RX REV CODE- 250/637: Performed by: PHYSICIAN ASSISTANT

## 2017-04-19 PROCEDURE — 65660000004 HC RM CVT STEPDOWN

## 2017-04-19 PROCEDURE — A6209 FOAM DRSG <=16 SQ IN W/O BDR: HCPCS

## 2017-04-19 PROCEDURE — 74011250637 HC RX REV CODE- 250/637: Performed by: SURGERY

## 2017-04-19 PROCEDURE — 77030029211 HC GEL MEDIH TU INLC -B

## 2017-04-19 PROCEDURE — 74011250636 HC RX REV CODE- 250/636: Performed by: PHYSICIAN ASSISTANT

## 2017-04-19 PROCEDURE — 74011250637 HC RX REV CODE- 250/637: Performed by: INTERNAL MEDICINE

## 2017-04-19 PROCEDURE — 77030011255 HC DSG AQUACEL AG BMS -A

## 2017-04-19 PROCEDURE — 74011000258 HC RX REV CODE- 258: Performed by: INTERNAL MEDICINE

## 2017-04-19 RX ORDER — WARFARIN SODIUM 5 MG/1
2.5 TABLET ORAL ONCE
Status: COMPLETED | OUTPATIENT
Start: 2017-04-19 | End: 2017-04-19

## 2017-04-19 RX ORDER — METOPROLOL TARTRATE 25 MG/1
12.5 TABLET, FILM COATED ORAL EVERY 12 HOURS
Status: DISCONTINUED | OUTPATIENT
Start: 2017-04-19 | End: 2017-04-20

## 2017-04-19 RX ORDER — AMIODARONE HYDROCHLORIDE 200 MG/1
400 TABLET ORAL 2 TIMES DAILY
Status: DISCONTINUED | OUTPATIENT
Start: 2017-04-19 | End: 2017-04-21 | Stop reason: HOSPADM

## 2017-04-19 RX ADMIN — METOPROLOL TARTRATE 5 MG: 5 INJECTION INTRAVENOUS at 10:01

## 2017-04-19 RX ADMIN — Medication 2 MG: at 21:48

## 2017-04-19 RX ADMIN — ATORVASTATIN CALCIUM 40 MG: 40 TABLET, FILM COATED ORAL at 09:00

## 2017-04-19 RX ADMIN — Medication 10 ML: at 21:47

## 2017-04-19 RX ADMIN — DILTIAZEM HYDROCHLORIDE 240 MG: 240 CAPSULE, COATED, EXTENDED RELEASE ORAL at 09:00

## 2017-04-19 RX ADMIN — AMIODARONE HYDROCHLORIDE 1 MG/MIN: 1.8 INJECTION, SOLUTION INTRAVENOUS at 00:04

## 2017-04-19 RX ADMIN — SODIUM CHLORIDE 5 MG/HR: 900 INJECTION, SOLUTION INTRAVENOUS at 00:04

## 2017-04-19 RX ADMIN — ENOXAPARIN SODIUM 80 MG: 80 INJECTION, SOLUTION INTRAVENOUS; SUBCUTANEOUS at 22:00

## 2017-04-19 RX ADMIN — DOCUSATE SODIUM 100 MG: 100 CAPSULE, LIQUID FILLED ORAL at 09:00

## 2017-04-19 RX ADMIN — WARFARIN SODIUM 2.5 MG: 5 TABLET ORAL at 18:52

## 2017-04-19 RX ADMIN — PIPERACILLIN SODIUM,TAZOBACTAM SODIUM 3.38 G: 3; .375 INJECTION, POWDER, FOR SOLUTION INTRAVENOUS at 17:00

## 2017-04-19 RX ADMIN — THERA TABS 1 TABLET: TAB at 11:43

## 2017-04-19 RX ADMIN — DIGOXIN 125 MCG: 250 INJECTION, SOLUTION INTRAMUSCULAR; INTRAVENOUS at 09:00

## 2017-04-19 RX ADMIN — Medication 10 ML: at 14:00

## 2017-04-19 RX ADMIN — METOPROLOL TARTRATE 5 MG: 5 INJECTION INTRAVENOUS at 04:00

## 2017-04-19 RX ADMIN — Medication 10 ML: at 10:00

## 2017-04-19 RX ADMIN — FENTANYL CITRATE 50 MCG: 50 INJECTION INTRAMUSCULAR; INTRAVENOUS at 12:02

## 2017-04-19 RX ADMIN — Medication 10 ML: at 00:08

## 2017-04-19 RX ADMIN — DIPHENHYDRAMINE HYDROCHLORIDE 12.5 MG: 50 INJECTION, SOLUTION INTRAMUSCULAR; INTRAVENOUS at 11:41

## 2017-04-19 RX ADMIN — ENOXAPARIN SODIUM 80 MG: 80 INJECTION, SOLUTION INTRAVENOUS; SUBCUTANEOUS at 11:58

## 2017-04-19 RX ADMIN — AMIODARONE HYDROCHLORIDE 400 MG: 200 TABLET ORAL at 18:00

## 2017-04-19 RX ADMIN — METOPROLOL TARTRATE 12.5 MG: 25 TABLET ORAL at 21:47

## 2017-04-19 RX ADMIN — Medication 1000 MCG: at 09:00

## 2017-04-19 RX ADMIN — DOCUSATE SODIUM 100 MG: 100 CAPSULE, LIQUID FILLED ORAL at 18:00

## 2017-04-19 RX ADMIN — Medication 2 MG: at 00:05

## 2017-04-19 RX ADMIN — PIPERACILLIN SODIUM,TAZOBACTAM SODIUM 3.38 G: 3; .375 INJECTION, POWDER, FOR SOLUTION INTRAVENOUS at 22:28

## 2017-04-19 RX ADMIN — PIPERACILLIN SODIUM,TAZOBACTAM SODIUM 3.38 G: 3; .375 INJECTION, POWDER, FOR SOLUTION INTRAVENOUS at 11:58

## 2017-04-19 RX ADMIN — AMIODARONE HYDROCHLORIDE 400 MG: 200 TABLET ORAL at 12:04

## 2017-04-19 RX ADMIN — CLOPIDOGREL 75 MG: 75 TABLET, FILM COATED ORAL at 09:00

## 2017-04-19 RX ADMIN — PIPERACILLIN SODIUM,TAZOBACTAM SODIUM 3.38 G: 3; .375 INJECTION, POWDER, FOR SOLUTION INTRAVENOUS at 09:58

## 2017-04-19 NOTE — PROGRESS NOTES
OT orders received and chart reviewed. Pt not seen for skilled OT due to:  []  Nausea/vomiting  []  Eating  []  Pain  []  Pt lethargic  [x]  Cardiology, PA in patients' room. Will f/u later as patients' schedule allows. Dr. Bob Bobby verbally cleared patient to work with therapy post cardioversion. Thank you, will follow up.   Sherial Severance OTR/IWONA

## 2017-04-19 NOTE — PROGRESS NOTES
[facility]  -  Progress Note      Patient: Natalia Huerta MRN: [id number]  SSN: [id number]    YOB: 1960  Age: 59 y.o. Sex: male      Admit Date: 4/4/2017    Hospital Problem List:     Hospital Problems  Date Reviewed: 4/4/2017          Codes Class Noted POA    Transient alteration of awareness ICD-10-CM: R40.4  ICD-9-CM: 780.02  4/18/2017 Unknown        A-fib [facility]) ICD-10-CM: I48.91  ICD-9-CM: 427.31  4/17/2017 Unknown        Aortoiliac occlusive disease ([facility] ICD-10-CM: G77.14  ICD-9-CM: 444.09  1/25/2017 Unknown        Atherosclerosis of native artery of both lower extremities with intermittent claudication ([facility] ICD-10-CM: V65.284  ICD-9-CM: 440.21  1/25/2017 Unknown        PAD (peripheral artery disease) ([facility] ICD-10-CM: I73.9  ICD-9-CM: 443.9  1/25/2017 Unknown            - A.fib. Persistent for past 2 months, HR 150s as outpatient and asymptomatic, s/pTEE cardioversion 4/18/17. Remains in NSR. On amio/cardizem gtts. On coumadin for [address] [place] [address]. - PAD, s/p aortoiliac occlusive disease s/p R axillary fem bypass and fem-pop 4/10/17. Now on Plavix, Lovenox/warfarin.  - Acute Kidney Injury. Resolved, Cr back to baseline.  - Cardiomyopathy, ischemic. ECHO 2/24/17 EF 35-40% mod. diffuse hypokinesis, no evidence of ADHF.  - s/p cath 3/15/17 with drug eluding stents to LCx and RCA. - Asymptomatic carotid artery disease.  - Essential Hypertension.  - Anemia, post-operatively, s/p 2 units PRBC. Assessment & Plan:     -Remains in NSR. Will stop cardizem gtt. -Will start PO amio. 400mg BID to be continued for 2 weeks. Pt will then decrease dose to 200mg BID. -Continue PO cardizem - decrease to 120mg with low normal BP.   -Would like patient to resume BB therapy with known cardiomyopathy. Will start at as 12.5mg.  Can resume with this evening's dose.   -Will discontinue IV lopressor and IV digoxin.  -Continue coumadin with goal INR 2.5-3.5.   -Continue DAPT with ASA and Plavix for PAD.  -Pt will need to be seen in our office for follow up in 4 weeks with Dr. Zoila Her. (Unit secretary to schedule)    Subjective:     Comfortable. Resting in bed. Family at bedside. No pain. No dizziness or palps.      Objective:      Patient Vitals for the past 8 hrs:   Temp Pulse Resp BP SpO2   04/19/17 0813 98.6 °F (37 °C) 77 25 97/55 98 %         Patient Vitals for the past 96 hrs:   Weight   04/19/17 0444 82.8 kg (182 lb 9.6 oz)   04/18/17 0419 83.2 kg (183 lb 6.8 oz)   04/17/17 0423 83.1 kg (183 lb 4.8 oz)   04/16/17 0400 85.8 kg (189 lb 1.6 oz)         Intake/Output Summary (Last 24 hours) at 04/19/17 1125  Last data filed at 04/19/17 0445   Gross per 24 hour   Intake              480 ml   Output             1100 ml   Net             -620 ml       Physical Exam:  General:  Awake, alert, oriented x 3  Neck:  Supple, no jvd  Lungs:  Clear to auscultation bilat  Heart:  Reg rate and rhythm  Abdomen:  Thin, soft  Extremities:  No edema, RLE with dressing c/d/i    Data Review:     Labs: Results:       Chemistry Recent Labs      04/18/17   0530  04/17/17   1027   GLU  86  82   NA  136  138   K  4.2  4.0   CL  101  102   CO2  26  25   BUN  12  9   CREA  0.76  0.78   CA  8.4*  8.6   AGAP  9  11   BUCR  16  12   AP   --   655*   TP   --   7.1   ALB   --   2.3*   GLOB   --   4.8*   AGRAT   --   0.5*      CBC w/Diff Recent Labs      04/19/17   0557  04/18/17   0530  04/17/17   0511   WBC  9.6  9.8  15.8*   RBC  2.72*  2.60*  2.71*   HGB  8.2*  7.7*  8.0*   HCT  23.7*  22.6*  23.4*   PLT  406  463*  412      Cardiac Enzymes No results found for: CPK, CKMMB, CKMB, RCK3, CKMBT, CKNDX, CKND1, LEO, TROPT, TROIQ, DEWAYNE, TROPT, TNIPOC, BNP, BNPP   Coagulation Recent Labs      04/19/17   0557  04/18/17   0530   PTP  21.4*  19.9*   INR  2.0*  1.8*       Lipid Panel Lab Results   Component Value Date/Time    Cholesterol, total 157 12/29/2015 07:18 AM    HDL Cholesterol 28 12/29/2015 07:18 AM    LDL, calculated 112.4 12/29/2015 07:18 AM    VLDL, calculated 16.6 12/29/2015 07:18 AM    Triglyceride 83 12/29/2015 07:18 AM    CHOL/HDL Ratio 5.6 12/29/2015 07:18 AM      BNP No results found for: BNP, BNPP, XBNPT   Liver Enzymes Recent Labs      04/17/17   1027   TP  7.1   ALB  2.3*   AP  655*   SGOT  60*      Digoxin    Thyroid Studies Lab Results   Component Value Date/Time    TSH 4.77 04/06/2017 05:30 AM            Signed By: JUNE Wen     April 19, 2017

## 2017-04-19 NOTE — WOUND CARE
Physical Exam   Musculoskeletal:        Legs:       Feet:      Wound Heel (Active)   DRESSING TYPE Open to air 4/19/2017 10:54 AM   Wound Length (cm) 3.5 cm 4/19/2017 10:54 AM   Wound Width (cm) 3.9 cm 4/19/2017 10:54 AM   Condition of Base Eschar;Slough 4/19/2017 10:54 AM   Tissue Type Percent Black 95 % 4/19/2017 10:54 AM   Tissue Type Percent Yellow 5 4/19/2017 10:54 AM   Drainage Amount  Scant 4/19/2017 10:54 AM   Drainage Color Serous 4/19/2017 10:54 AM   Wound Odor None 4/19/2017 10:54 AM   Dressing Type Applied Honey;Silver products;Silicone 5/33/2120 75:33 AM   Procedure Tolerated Well 4/19/2017 10:54 AM   Number of days:15       Wound Calf Right;Posterior (Active) clinically closed   DRESSING TYPE Open to air 4/19/2017 10:54 AM   Non-Pressure Ulcer Partial thickness (epider/derm) 4/19/2017 10:54 AM   Condition of Base Other (comment) 4/19/2017 10:54 AM   Epithelialization (%) 100 4/19/2017 10:54 AM   Tissue Type Yellow 4/19/2017 10:54 AM   Drainage Amount  None 4/19/2017 10:54 AM   Wound Odor None 4/19/2017 10:54 AM   Dressing Type Applied Open to air 4/19/2017 10:54 AM   Procedure Tolerated Well 4/19/2017 10:54 AM   Number of days:6       Wound Ankle Right;Lateral (Active)   DRESSING TYPE Open to air 4/19/2017 10:54 AM   Wound Length (cm) 2 cm 4/19/2017 10:54 AM   Wound Width (cm) 2.4 cm 4/19/2017 10:54 AM   Condition of Base Eschar;Slough 4/19/2017 10:54 AM   Tissue Type Black; Yellow 4/19/2017 10:54 AM   Tissue Type Percent Black 95 % 4/19/2017 10:54 AM   Tissue Type Percent Yellow 5 4/19/2017 10:54 AM   Drainage Amount  None 4/19/2017 10:54 AM   Wound Odor None 4/19/2017 10:54 AM   Periwound Skin Condition Intact 4/19/2017 10:54 AM   Dressing Type Applied Honey;Silver products;Silicone 9/31/7479 07:25 AM   Procedure Tolerated Well 4/19/2017 10:54 AM   Number of days:6          2306: Patient seen by wound care for assessment of ruptured and intact blister wounds.  Open blisters dressed with honey, silver ag and silicone dressings. Tubi  size G applied to assist with edema control. Care turned over to nursing.   Kin Torres RN

## 2017-04-19 NOTE — PROGRESS NOTES
Admit Date: 2017    POD 9 Days Post-Op    Procedure:  Procedure(s): FEMORAL-POPLITEAL BYPASS/WITH GRAFT RIGHT LEG    Subjective:     Patient was sleeping but arousable. Mental state appropriate but states he feels tired  No complaints related to leg    Objective:     Blood pressure 109/67, pulse 84, temperature 99 °F (37.2 °C), resp. rate 19, height 5' 10\" (1.778 m), weight 182 lb 9.6 oz (82.8 kg), SpO2 93 %. Temp (24hrs), Av.3 °F (36.8 °C), Min:97.7 °F (36.5 °C), Max:99 °F (37.2 °C)      Physical Exam:    Incision sites intact with scant drainage  Dressings otherwise intact  Edema greatly improved right leg  Warm and well perfused otherwise      Labs:   Recent Results (from the past 24 hour(s))   EKG, 12 LEAD, SUBSEQUENT    Collection Time: 17 11:06 AM   Result Value Ref Range    Ventricular Rate 81 BPM    Atrial Rate 81 BPM    P-R Interval 172 ms    QRS Duration 88 ms    Q-T Interval 352 ms    QTC Calculation (Bezet) 408 ms    Calculated P Axis 59 degrees    Calculated R Axis -3 degrees    Calculated T Axis -92 degrees    Diagnosis       Normal sinus rhythm  Septal infarct , age undetermined  ST & T wave abnormality, consider lateral ischemia  Abnormal ECG    Confirmed by Lani Horvath MD, Anand Muniz ([id number]) on 2017 1:00:49 PM     PROTHROMBIN TIME + INR    Collection Time: 17  5:57 AM   Result Value Ref Range    Prothrombin time 21.4 (H) 11.5 - 15.2 sec    INR 2.0 (H) 0.8 - 1.2     CBC W/O DIFF    Collection Time: 17  5:57 AM   Result Value Ref Range    WBC 9.6 4.6 - 13.2 K/uL    RBC 2.72 (L) 4.70 - 5.50 M/uL    HGB 8.2 (L) 13.0 - 16.0 g/dL    HCT 23.7 (L) 36.0 - 48.0 %    MCV 87.1 74.0 - 97.0 FL    MCH 30.1 24.0 - 34.0 PG    MCHC 34.6 31.0 - 37.0 g/dL    RDW 15.7 (H) 11.6 - 14.5 %    PLATELET 239 161 - 510 K/uL    MPV 9.2 9.2 - 11.8 FL       Assessment:     Active Problems:     Aortoiliac occlusive disease ([facility] (2017)      Atherosclerosis of native artery of both lower extremities with [facility] claudication [facility] (1/25/2017)      PAD (peripheral artery disease) ([facility] (1/25/2017)      A-fib ([facility] (4/17/2017)      Transient alteration of awareness (4/18/2017)        Plan/Recommendations/Medical Decision Making:     Continue lovenox until inr on coumadin closer to 2.5 range  Mobilize with therapy  Wound care  Follow up cardiology recommendations

## 2017-04-20 LAB
INR PPP: 2.2 (ref 0.8–1.2)
PROTHROMBIN TIME: 23.6 SEC (ref 11.5–15.2)

## 2017-04-20 PROCEDURE — 74011250637 HC RX REV CODE- 250/637: Performed by: SURGERY

## 2017-04-20 PROCEDURE — 97164 PT RE-EVAL EST PLAN CARE: CPT

## 2017-04-20 PROCEDURE — 36415 COLL VENOUS BLD VENIPUNCTURE: CPT | Performed by: PHYSICIAN ASSISTANT

## 2017-04-20 PROCEDURE — 74011250637 HC RX REV CODE- 250/637: Performed by: PHYSICIAN ASSISTANT

## 2017-04-20 PROCEDURE — 97530 THERAPEUTIC ACTIVITIES: CPT

## 2017-04-20 PROCEDURE — 74011250636 HC RX REV CODE- 250/636: Performed by: SURGERY

## 2017-04-20 PROCEDURE — 97116 GAIT TRAINING THERAPY: CPT

## 2017-04-20 PROCEDURE — 65660000004 HC RM CVT STEPDOWN

## 2017-04-20 PROCEDURE — 74011000258 HC RX REV CODE- 258: Performed by: SURGERY

## 2017-04-20 PROCEDURE — 85610 PROTHROMBIN TIME: CPT | Performed by: PHYSICIAN ASSISTANT

## 2017-04-20 PROCEDURE — 97168 OT RE-EVAL EST PLAN CARE: CPT

## 2017-04-20 PROCEDURE — 74011250637 HC RX REV CODE- 250/637: Performed by: INTERNAL MEDICINE

## 2017-04-20 RX ORDER — METOPROLOL TARTRATE 25 MG/1
25 TABLET, FILM COATED ORAL EVERY 12 HOURS
Status: DISCONTINUED | OUTPATIENT
Start: 2017-04-20 | End: 2017-04-21 | Stop reason: HOSPADM

## 2017-04-20 RX ORDER — OXYCODONE AND ACETAMINOPHEN 10; 325 MG/1; MG/1
1 TABLET ORAL
Status: DISCONTINUED | OUTPATIENT
Start: 2017-04-20 | End: 2017-04-21 | Stop reason: HOSPADM

## 2017-04-20 RX ORDER — WARFARIN SODIUM 5 MG/1
2.5 TABLET ORAL ONCE
Status: COMPLETED | OUTPATIENT
Start: 2017-04-20 | End: 2017-04-20

## 2017-04-20 RX ORDER — DILTIAZEM HYDROCHLORIDE 120 MG/1
120 CAPSULE, COATED, EXTENDED RELEASE ORAL DAILY
Status: DISCONTINUED | OUTPATIENT
Start: 2017-04-21 | End: 2017-04-21 | Stop reason: HOSPADM

## 2017-04-20 RX ADMIN — OXYCODONE HYDROCHLORIDE AND ACETAMINOPHEN 1 TABLET: 10; 325 TABLET ORAL at 08:33

## 2017-04-20 RX ADMIN — AMIODARONE HYDROCHLORIDE 400 MG: 200 TABLET ORAL at 17:08

## 2017-04-20 RX ADMIN — Medication 10 ML: at 07:36

## 2017-04-20 RX ADMIN — PIPERACILLIN SODIUM,TAZOBACTAM SODIUM 3.38 G: 3; .375 INJECTION, POWDER, FOR SOLUTION INTRAVENOUS at 17:08

## 2017-04-20 RX ADMIN — DILTIAZEM HYDROCHLORIDE 240 MG: 240 CAPSULE, COATED, EXTENDED RELEASE ORAL at 08:15

## 2017-04-20 RX ADMIN — OXYCODONE HYDROCHLORIDE AND ACETAMINOPHEN 1 TABLET: 10; 325 TABLET ORAL at 13:13

## 2017-04-20 RX ADMIN — Medication 1000 MCG: at 08:16

## 2017-04-20 RX ADMIN — DOCUSATE SODIUM 100 MG: 100 CAPSULE, LIQUID FILLED ORAL at 08:15

## 2017-04-20 RX ADMIN — THERA TABS 1 TABLET: TAB at 08:16

## 2017-04-20 RX ADMIN — PIPERACILLIN SODIUM,TAZOBACTAM SODIUM 3.38 G: 3; .375 INJECTION, POWDER, FOR SOLUTION INTRAVENOUS at 11:57

## 2017-04-20 RX ADMIN — PIPERACILLIN SODIUM,TAZOBACTAM SODIUM 3.38 G: 3; .375 INJECTION, POWDER, FOR SOLUTION INTRAVENOUS at 22:39

## 2017-04-20 RX ADMIN — PIPERACILLIN SODIUM,TAZOBACTAM SODIUM 3.38 G: 3; .375 INJECTION, POWDER, FOR SOLUTION INTRAVENOUS at 05:13

## 2017-04-20 RX ADMIN — ATORVASTATIN CALCIUM 40 MG: 40 TABLET, FILM COATED ORAL at 08:15

## 2017-04-20 RX ADMIN — WARFARIN SODIUM 2.5 MG: 5 TABLET ORAL at 17:08

## 2017-04-20 RX ADMIN — METOPROLOL TARTRATE 12.5 MG: 25 TABLET ORAL at 08:14

## 2017-04-20 RX ADMIN — DOCUSATE SODIUM 100 MG: 100 CAPSULE, LIQUID FILLED ORAL at 17:08

## 2017-04-20 RX ADMIN — AMIODARONE HYDROCHLORIDE 400 MG: 200 TABLET ORAL at 08:15

## 2017-04-20 RX ADMIN — Medication 10 ML: at 22:40

## 2017-04-20 RX ADMIN — Medication 10 ML: at 12:00

## 2017-04-20 RX ADMIN — METOPROLOL TARTRATE 25 MG: 25 TABLET ORAL at 22:38

## 2017-04-20 RX ADMIN — OXYCODONE HYDROCHLORIDE AND ACETAMINOPHEN 1 TABLET: 10; 325 TABLET ORAL at 17:08

## 2017-04-20 RX ADMIN — CLOPIDOGREL 75 MG: 75 TABLET, FILM COATED ORAL at 08:15

## 2017-04-20 NOTE — PROGRESS NOTES
Patient soon approaching his discharge. He will to attend rehab or snf post acute care. I spoke to Severo Schanz, she will discuss this pt'a case on their huddle. Pt's insurance will require auth; recent PT/OT recs will be needed. I called, [facility] for [facility]-PT/OT coordinator, with request to eval the pt asap. Will continue to follow.

## 2017-04-20 NOTE — PROGRESS NOTES
NUTRITION    BPA/MST Referral       RECOMMENDATIONS / PLAN:     - Continue with current nutrition interventions. - Continue RD inpatient monitoring and evaluation. NUTRITION INTERVENTIONS & DIAGNOSIS:     [x] Meals/Snacks:  Modified diet  [x] Medical food supplementation: Ensure Enlive TID    Nutrition Diagnosis: Increased nutrient needs related to wound healing as evidenced by surgical incisions and pressure ulcer. ASSESSMENT:     Subjective/Objective: Alert and oriented. Appetite improved, eating well. Consuming supplements. No concerns.      Average po intake adequate to meet patients estimated nutritional needs:   [x] Yes     [] No   [] Unable to determine at this time    Diet: DIET NUTRITIONAL SUPPLEMENTS All Meals; ENSURE ENLIVE  DIET CARDIAC Regular; 2 GM NA (House Low NA)      Food Allergies: NKFA  Current Appetite:   [x] Good     [] Fair     [] Poor     [] Other:  Appetite/meal intake prior to admission:   [] Good     [x] Fair     [] Poor     [] Other:  Feeding Limitations:  [] Swallowing difficulty    [] Chewing difficulty    [] Other:  Current Meal Intake: Patient Vitals for the past 100 hrs:   % Diet Eaten   04/18/17 1923 90 %   04/18/17 1329 98 %   04/17/17 1321 0 %   04/17/17 0811 0 %   04/16/17 1741 0 %   04/16/17 1239 0 %   04/16/17 0933 0 %       BM: 4/19  Skin Integrity: surgical incision to right axilla, right groin, left groin, right leg; unstageable pressure ulcer to heel   Edema: trace genital, trace LEs  Pertinent Medications: Reviewed: cyanocobalamin     Recent Labs      04/18/17   0530   NA  136   K  4.2   CL  101   CO2  26   GLU  86   BUN  12   CREA  0.76   CA  8.4*       Intake/Output Summary (Last 24 hours) at 04/20/17 1226  Last data filed at 04/20/17 1200   Gross per 24 hour   Intake          3977.66 ml   Output             1850 ml   Net          2127.66 ml       Anthropometrics:  Ht Readings from Last 1 Encounters:   03/30/17 5' 10\" (1.778 m)     Last 3 Recorded Weights in this Encounter    04/18/17 0419 04/19/17 0444 04/20/17 0255   Weight: 83.2 kg (183 lb 6.8 oz) 82.8 kg (182 lb 9.6 oz) 82.5 kg (181 lb 14.1 oz)     Body mass index is 26.1 kg/(m^2). Weight History: reports weight loss from 190# over the last month. Weight Metrics 4/20/2017 4/4/2017 3/29/2017 3/17/2017 3/16/2017 3/8/2017 2/27/2017   Weight 181 lb 14.1 oz - 170 lb 170 lb 170 lb 9.6 oz 189 lb 179 lb   BMI - 26.1 kg/m2 24.39 kg/m2 24.39 kg/m2 24.48 kg/m2 27.12 kg/m2 25.68 kg/m2        Admitting Diagnosis: Atheroscler of native artery of both legs with intermit claudication ([facility] [I70.213]  Past Medical History:   Diagnosis Date    [facility]     Abnormal EKG     CAD S/P percutaneous coronary angioplasty 03/2017    Essential hypertension     Lumbar canal stenosis 05/04/2015    Dr. Una Miller PVD (peripheral vascular disease) [facility])        Education Needs:        [x] None identified  [] Identified - Not appropriate at this time  []  Identified and addressed - refer to education log  Learning Limitations:   [x] None identified  [] Identified    Cultural, Scientologist & ethnic food preferences:  [x] None identified    [] Identified and addressed     ESTIMATED NUTRITION NEEDS:     Calories: 2557-5778 kcal (30-35 kcal/kg) based on  [x] Actual BW: 81 kg      [] IBW   Protein: 105-121 gm (1.3-1.5 gm/kg) based on  [x] Actual BW      [] IBW   Fluid: 1 mL/kcal     MONITORING & EVALUATION:     Nutrition Goal(s):   1. Po intake of meals will meet >75% of patient estimated nutritional needs within the next 7 days.   Outcome:  [x] Met/Ongoing    []  Not Met; Progressing    [] New/Initial Goal     Monitoring:   [x] Diet tolerance   [x] Meal intake   [x] Supplement intake   [] GI symptoms/ability to tolerate po diet   [] Respiratory status   [] Plan of care      Previous Recommendations (for follow-up assessments only):     [x]   Implemented       []   Not Implemented (RD to address)     [] No Recommendation Made Discharge Planning: cardiac diet     [x] Participated in care planning, discharge planning, & interdisciplinary rounds as appropriate      Delmy Lipscomb RD, [address]   Pager: [phone]

## 2017-04-20 NOTE — ROUTINE PROCESS
Bedside and Verbal shift change report given to Delfino (oncoming nurse) by Анна Germain (offgoing nurse). Report included the following information SBAR, Kardex, MAR and Recent Results.

## 2017-04-20 NOTE — PROGRESS NOTES
[facility]  -  Progress Note      Patient: Eduardo Saavedra MRN: [id number]  SSN: [id number]    YOB: 1960  Age: 59 y.o. Sex: male      Admit Date: 4/4/2017    Hospital Problem List:     Hospital Problems  Date Reviewed: 4/4/2017          Codes Class Noted POA    Transient alteration of awareness ICD-10-CM: R40.4  ICD-9-CM: 780.02  4/18/2017 Unknown        A-fib [facility]) ICD-10-CM: I48.91  ICD-9-CM: 427.31  4/17/2017 Unknown        Aortoiliac occlusive disease ([facility] ICD-10-CM: I74.09  ICD-9-CM: 444.09  1/25/2017 Unknown        Atherosclerosis of native artery of both lower extremities with intermittent claudication ([facility] ICD-10-CM: S11.903  ICD-9-CM: 440.21  1/25/2017 Unknown        PAD (peripheral artery disease) ([facility] ICD-10-CM: I73.9  ICD-9-CM: 443.9  1/25/2017 Unknown                Assessment & Plan:     -Hemodynamics tolerating PO amio. -CCB should be decreased given EF 40% and BB should be titrated. Decreased to 120mg cardizem daily and increased BB to 25mg BID. -Ambulating with PT in the hallway this morning without shortness of breath. HR/rhythm remained stable. -Continue warfarin.  -Pt should have cardiology follow-up appt scheduled by unit secretary with Dr. Graciela Chicas to acute rehab in progress. Subjective:     No new complaints.      Objective:      Patient Vitals for the past 8 hrs:   Temp Pulse Resp BP SpO2   04/20/17 0750 98.4 °F (36.9 °C) 78 20 118/65 100 %   04/20/17 0548 98.2 °F (36.8 °C) 80 20 115/68 94 %         Patient Vitals for the past 96 hrs:   Weight   04/20/17 0255 82.5 kg (181 lb 14.1 oz)   04/19/17 0444 82.8 kg (182 lb 9.6 oz)   04/18/17 0419 83.2 kg (183 lb 6.8 oz)   04/17/17 0423 83.1 kg (183 lb 4.8 oz)         Intake/Output Summary (Last 24 hours) at 04/20/17 1142  Last data filed at 04/20/17 0755   Gross per 24 hour   Intake          3877.66 ml   Output             2250 ml   Net          1627.66 ml       Physical Exam:  General: awake, alert, oriented x 3  Neck:  Supple, no jvd  Lungs:  Clear to auscultation bilat  Heart:  Reg rate and rhythm  Abdomen: soft, non-tender  Extremities:  No edema    Data Review:     Labs: Results:       Chemistry Recent Labs      04/18/17   0530   GLU  86   NA  136   K  4.2   CL  101   CO2  26   BUN  12   CREA  0.76   CA  8.4*   AGAP  9   BUCR  16      CBC w/Diff Recent Labs      04/19/17   0557  04/18/17   0530   WBC  9.6  9.8   RBC  2.72*  2.60*   HGB  8.2*  7.7*   HCT  23.7*  22.6*   PLT  406  463*      Cardiac Enzymes No results found for: CPK, CKMMB, CKMB, RCK3, CKMBT, CKNDX, CKND1, LEO, TROPT, TROIQ, DEWAYNE, TROPT, TNIPOC, BNP, BNPP   Coagulation Recent Labs      04/20/17   0600  04/19/17   0557   PTP  23.6*  21.4*   INR  2.2*  2.0*       Lipid Panel Lab Results   Component Value Date/Time    Cholesterol, total 157 12/29/2015 07:18 AM    HDL Cholesterol 28 12/29/2015 07:18 AM    LDL, calculated 112.4 12/29/2015 07:18 AM    VLDL, calculated 16.6 12/29/2015 07:18 AM    Triglyceride 83 12/29/2015 07:18 AM    CHOL/HDL Ratio 5.6 12/29/2015 07:18 AM      BNP No results found for: BNP, BNPP, XBNPT   Liver Enzymes No results for input(s): TP, ALB, TBIL, AP, SGOT, GPT in the last 72 hours.     No lab exists for component: DBIL   Digoxin    Thyroid Studies Lab Results   Component Value Date/Time    TSH 4.77 04/06/2017 05:30 AM            Signed By: JUNE Jones     April 20, 2017

## 2017-04-20 NOTE — PROGRESS NOTES
ARU/IPR REFERRAL CONTACT NOTE  [address] [place] [address] for Physical Rehabilitation    RE: Herlinda Thomson    Referral received to review this patient's case for admission to [address] [place] [address] for Physical Rehabilitation. Current status reviewed.   Will need updated PT/OT treatments on this patient to complete the pre-admission evaluation.  Will continue to follow. If this pt is reviewed by the team and is appropriate it will still be contingent on authorization from [facility]. Thank you for this referral.  Should you have any questions please do not hesitate to call. Sincerely,  Wendy Reina.  [address] [facility]  [phone]

## 2017-04-20 NOTE — PROGRESS NOTES
Problem: Self Care Deficits Care Plan (Adult)  Goal: *Acute Goals and Plan of Care (Insert Text)  Occupational Therapy Goals  Initiated 4/13/2017 within 7 day(s). Re-eval on 4/20/17 changed goals    1. Patient will perform grooming with supervision/set-up standing at sink  2. Patient will perform lower body bathing with supervision/set-up. 3. Patient will perform lower body dressing with supervision/set-up. 4. Patient will perform toilet transfers with supervision/set-up. 5. Patient will perform all aspects of toileting with supervision/set-up. Outcome: Progressing Towards Goal  OCCUPATIONAL THERAPY REEVALUATION     Patient: Betty Ramirez (89 y.o. male)  Date: 4/20/2017  Diagnosis: Atheroscler of native artery of both legs with intermit claudication ([facility] [I70.213] <principal problem not specified>  Procedure(s) (LRB):  FEMORAL-POPLITEAL BYPASS/WITH GRAFT RIGHT LEG (Right) 10 Days Post-Op  Precautions:  (WBAT RUE)      ASSESSMENT :  Based on the objective data described below, the patient very motivated about OT session today. Patient needed supervision during bed mobility and CGA with additional time for functional mobility with use of rolling walker. Patient needed min A to simulate LE self care tasks seated in chair. Noted 1+ edema on R hand; elevated RUE on pillow and educated patient on fist pumps to decrease edema; nurse notified and aware. Patient would benefit from rehab for strengthening in order to improve functional mobility and participation in self care tasks. Patient left comfortable in no distress in chair. Patient will benefit from skilled intervention to address the above impairments.   Patients rehabilitation potential is considered to be Good  Factors which may influence rehabilitation potential include:   [ ]                None noted  [ ]                Mental ability/status  [X]                Medical condition  [ ]                Home/family situation and support systems  [ ] Safety awareness  [ ]                Pain tolerance/management  [ ]                Other:        PLAN :  Recommendations and Planned Interventions:  [X]                  Self Care Training                  [X]           Therapeutic Activities  [X]                  Functional Mobility Training    [ ]           Cognitive Retraining  [X]                  Therapeutic Exercises           [X]           Endurance Activities  [X]                  Balance Training                   [ ]           Neuromuscular Re-Education  [ ]                  Visual/Perceptual Training     [X]      Home Safety Training  [X]                  Patient Education                 [ ]           Family Training/Education  [ ]                  Other (comment):     Frequency/Duration: Patient will be followed by occupational therapy 1-2 times per day/ 3-5 days per week to address goals. Discharge Recommendations: Inpatient Rehab  Further Equipment Recommendations for Discharge: TBD       G-CODES:      Self Care  Current  CI= 1-19%   Goal  CI= 1-19%   D/C  CI= 1-19%. The severity rating is based on the Level of Assistance required for Functional Mobility and ADLs. SUBJECTIVE:   Patient stated Rafa Carl you for coming.       OBJECTIVE DATA SUMMARY:   Hospital course since last seen and reason for reevaluation: Length of stay  Cognitive/Behavioral Status:  Neurologic State: Alert  Orientation Level: Oriented X4  Cognition: Follows commands  Safety/Judgement: Awareness of environment, Fall prevention     Skin: No skin changes noted     Edema: 1+ edema R hand     Vision/Perceptual:       Acuity: Within Defined Limits      Coordination:  Coordination: Within functional limits (BUEs)       Balance:  Sitting: Intact  Standing: Impaired; With support (with rolling walker)  Standing - Static: Good  Standing - Dynamic : Fair     Strength:  Strength: Generally decreased, functional ( strength)     Tone & Sensation:  Tone: Normal (BUEs)  Sensation: Intact (BUEs)     Range of Motion:  AROM: Generally decreased, functional (BUEs)     Functional Mobility and Transfers for ADLs:  Bed Mobility:  Rolling: Supervision  Supine to Sit: Supervision  Scooting: Supervision  Transfers:  Sit to Stand: Contact guard assistance              Toilet Transfer : Contact guard assistance (simulated with rolling walker)                 ADL Assessment:(clicnial judgement based on functional mobility)  Feeding: Independent     Oral Facial Hygiene/Grooming: Contact guard assistance     Bathing: Minimum assistance     Upper Body Dressing: Minimum assistance     Lower Body Dressing: Minimum assistance     Toileting: Contact guard assistance;Minimum assistance     Pain:  Pt reports 4/10 pain or discomfort prior to treatment. (R foot)  Pt reports 4/10 pain or discomfort post treatment. Activity Tolerance:   Fair     Please refer to the flowsheet for vital signs taken during this treatment. After treatment:   [X] Patient left in no apparent distress sitting up in chair  [ ] Patient left in no apparent distress in bed  [X] Call bell left within reach  [X] Nursing notified  [ ] Caregiver present  [ ] Bed alarm activated      COMMUNICATION/EDUCATION:   [ ]    Home safety education was provided and the patient/caregiver indicated understanding. [X]    Patient/family have participated as able in goal setting and plan of care. [X]    Patient/family agree to work toward stated goals and plan of care. [ ]    Patient understands intent and goals of therapy, but is neutral about his/her participation. [ ]    Patient is unable to participate in goal setting and plan of care. This patients plan of care is appropriate for delegation to [facility].      Thank you for this referral.  Samara Rodriguez, OTR/L  Time Calculation: 25 mins

## 2017-04-20 NOTE — ROUTINE PROCESS
0730: Report received and care assumed from [facility]. Vital signs stable, pt resting in bed. Will continue to monitor. 0800: Order received for PO pain medication for pain to lower extremities. Plan for PT/OT to eval pt and place pt in rehab as pt has poor mobility. Will continue to monitor. 1045: Pt amb in metzger with PT/OT and FWW. Pt able to amb 25-35 with SBA assist while using walking. Per PT/OT pt will require rehab, see notes from PT/OT for further detail. Will continue to monitor and pt appears to be in good spirits regarding potential dc to rehab within the next few days. 1645: Vital signs stable, pain well controlled with PO percocet. Awaiting approval for SNF from insurance. Will continue to monitor. 1915: Bedside shift change report given to [address] [place], [place] [address]vin Cedar (oncoming nurse) by Gregory Vang RN (offgoing nurse). Report included the following information SBAR, Kardex, Intake/Output, MAR, Recent Results, Med Rec Status, Cardiac Rhythm NSR and Alarm Parameters .

## 2017-04-20 NOTE — PROGRESS NOTES
Problem: Mobility Impaired (Adult and Pediatric)  Goal: *Acute Goals and Plan of Care (Insert Text)  STGs to be addressed within 3 days:  Re-evaluated 4/20/16  1. Bed mobility: Supine to sit to supine min/CGA with HR for meals. (GOAL MET)  2. Activity tolerance: Tolerate up in chair 1-2 hrs for ADLs. (Goal continued)  3. Transfers: Sit to stand to chair mod/min A with LRAD for ADLs. (GOAL MET)    LTGs to be addressed within 7 days:  1. Standing/Ambulation Balance: Increase to Good with LRAD for safe transfers and gait. (Goal continued)  2. Ambulation: Ambulate > 100 ft. S with LRAD for home mobility. (Goal continued)  3. Transfers: Sit to stand S with LRAD for ADLs. (Goal continued)  4. Stairs: Up/Down 2 steps CGA with HR for home entry. (Goal continued)  Outcome: Progressing Towards Goal  PHYSICAL THERAPY RE-EVALUATION AND TREATMENT     Patient: Isai Buck (36 y.o. male)  Date: 4/20/2017  Diagnosis: Atheroscler of native artery of both legs with intermit claudication [facility] [I70.213] <principal problem not specified>  Procedure(s) (LRB):  FEMORAL-POPLITEAL BYPASS/WITH GRAFT RIGHT LEG (Right) 10 Days Post-Op  Precautions: Fall (need clarification on ROM/WB status of RUE/shoulder)      ASSESSMENT:  Patient's progression toward goals since last assessment:  Patient continues to progress with functional mobility, main deficits for PT include decreased transfers, gait, and overall tolerance for activity. Patient's ambulation distance increasing, however, patient does require standing rest breaks due to increased pain in R LE and moderate STEWART. Patient initially required vc's for gt sequencing however, once redirection given, patient able to correct sequencing himself when incorrect. Patient set up in chair at conclusion of session, instructed patient to begin increasing OOB time. Patient verbalized comprehension.   Patient would benefit from continued PT to address above impairments and assist with discharge planning. PLAN:  Goals have been updated based on progression since last assessment. Patient continues to benefit from skilled intervention to address the above impairments. Continue to follow the patient daily x 4-7 x week to address goals. Planned Interventions:  [X]     Bed Mobility Training          [X]     Neuromuscular Re-Education  [X]     Transfer Training                [ ]    Orthotic/Prosthetic Training  [X]     Gait Training                       [ ]     Modalities  [X]     Therapeutic Exercises       [ ]     Edema Management/Control  [X]     Therapeutic Activities         [X]     Patient and Family Training/Education  [ ]     Other (comment):  Discharge Recommendations: Inpatient Rehab  Further Equipment Recommendations for Discharge: rolling walker       SUBJECTIVE:   Patient stated I have been waiting on you all.       OBJECTIVE DATA SUMMARY:   Critical Behavior:  Neurologic State: Alert, Appropriate for age  Orientation Level: Appropriate for age, Oriented X4  Cognition: Follows commands, Appropriate decision making, Appropriate for age attention/concentration, Appropriate safety awareness  Safety/Judgement: Awareness of environment, Fall prevention  Functional Mobility Training:  Bed Mobility:  Rolling: Supervision  Supine to Sit: Supervision  Scooting: Supervision  Level of Assistance: Supervision  Interventions: Verbal cues  Transfers:  Sit to Stand: Contact guard assistance;Stand-by asssistance (with RW)  Stand to Sit: Contact guard assistance (with RW)  Bed to Chair: Contact guard assistance (with RW)  Balance:  Sitting: Intact  Standing: Impaired;Pull to stand; With support (with RW)  Standing - Static: Good;Constant support (with RW)  Standing - Dynamic : Fair (with RW)  Ambulation/Gait Training:  Distance (ft): 35 Feet (ft)  Assistive Device: Gait belt;Walker, rolling  Ambulation - Level of Assistance: Contact guard assistance  Gait Abnormalities: Decreased step clearance; Step to gait  Right Side Weight Bearing: As tolerated  Base of Support: Narrowed  Speed/Suzanna: Pace decreased (<100 feet/min); Slow  Interventions: Verbal cues  Pain:  Pain Scale 1: Numeric (0 - 10)  Pain Intensity 1: 1  Pain Location 1: Foot  Pain Orientation 1: Right  Pain Description 1: Aching  Pain Intervention(s) 1: Medication (see MAR)  Activity Tolerance:   Good  Please refer to the flowsheet for vital signs taken during this treatment. After treatment:   [X]  Patient left in no apparent distress sitting up in chair  [ ]  Patient left in no apparent distress in bed  [X]  Call bell left within reach  [X]  Nursing notified  [ ]  Caregiver present  [ ]  Bed alarm activated     Yenni Pointer, PT   Time Calculation: 24 mins      G-codes:  Mobility  Current  CI= 1-19%   Goal  CI= 1-19%. The severity rating is based on the Level of Assistance required for Functional Mobility and ADLs.

## 2017-04-20 NOTE — PROGRESS NOTES
Pt doing well  Has been cleared for d/c from cardiac standpoint  However, after lengthy hospital stay and his overall condition, it is felt he will benefit from acute rehab  Will request eval for that today and ok for transferif accepted today/tomorrow

## 2017-04-20 NOTE — PROGRESS NOTES
Bedside shift change report given to BEAU Cole (oncoming nurse) by Elayne Estrada (offgoing nurse). Report included the following information:SBAR,MAR,Kardex and recent results. Patient is stable, denies any pain, Cardizem and Amiodarone Dripps discontinued. Patient denies any distress and heart Rhythm NSR.

## 2017-04-21 ENCOUNTER — DOCUMENTATION ONLY (OUTPATIENT)
Dept: FAMILY MEDICINE CLINIC | Age: 57
End: 2017-04-21

## 2017-04-21 ENCOUNTER — HOSPITAL ENCOUNTER (INPATIENT)
Age: 57
LOS: 8 days | Discharge: HOME HEALTH | DRG: 253 | End: 2017-04-29
Attending: INTERNAL MEDICINE | Admitting: INTERNAL MEDICINE
Payer: COMMERCIAL

## 2017-04-21 VITALS
WEIGHT: 180.7 LBS | OXYGEN SATURATION: 100 % | HEIGHT: 70 IN | DIASTOLIC BLOOD PRESSURE: 57 MMHG | TEMPERATURE: 97.3 F | SYSTOLIC BLOOD PRESSURE: 99 MMHG | HEART RATE: 71 BPM | RESPIRATION RATE: 18 BRPM | BODY MASS INDEX: 25.87 KG/M2

## 2017-04-21 PROBLEM — Z48.812 AFTERCARE FOLLOWING SURGERY OF THE CIRCULATORY SYSTEM: Status: ACTIVE | Noted: 2017-04-21

## 2017-04-21 PROBLEM — D62 ACUTE BLOOD LOSS AS CAUSE OF POSTOPERATIVE ANEMIA: Status: ACTIVE | Noted: 2017-04-04

## 2017-04-21 PROBLEM — I70.229 CRITICAL ISCHEMIA OF LOWER EXTREMITY (HCC): Status: ACTIVE | Noted: 2017-04-10

## 2017-04-21 PROBLEM — I25.10 CORONARY ARTERY DISEASE INVOLVING NATIVE CORONARY ARTERY OF NATIVE HEART: Chronic | Status: ACTIVE | Noted: 2017-03-15

## 2017-04-21 PROBLEM — R74.8 ELEVATED CREATINE KINASE LEVEL: Status: ACTIVE | Noted: 2017-04-05

## 2017-04-21 PROBLEM — L97.519 CHRONIC ULCER OF RIGHT FOOT (HCC): Chronic | Status: ACTIVE | Noted: 2017-04-04

## 2017-04-21 PROBLEM — Z95.828 STATUS POST FEMORAL-POPLITEAL BYPASS SURGERY: Status: ACTIVE | Noted: 2017-04-21

## 2017-04-21 PROBLEM — N17.9 ACUTE KIDNEY INJURY (HCC): Status: ACTIVE | Noted: 2017-04-04

## 2017-04-21 PROBLEM — I70.213 ATHEROSCLEROSIS OF NATIVE ARTERY OF BOTH LOWER EXTREMITIES WITH INTERMITTENT CLAUDICATION (HCC): Chronic | Status: ACTIVE | Noted: 2017-01-25

## 2017-04-21 PROBLEM — Z98.890 HISTORY OF CARDIOVERSION: Status: ACTIVE | Noted: 2017-04-18

## 2017-04-21 PROBLEM — E83.42 HYPOMAGNESEMIA: Status: ACTIVE | Noted: 2017-04-06

## 2017-04-21 PROBLEM — I74.09 AORTOILIAC OCCLUSIVE DISEASE (HCC): Chronic | Status: ACTIVE | Noted: 2017-01-25

## 2017-04-21 PROBLEM — Z74.09 IMPAIRED MOBILITY AND ADLS: Status: ACTIVE | Noted: 2017-04-04

## 2017-04-21 PROBLEM — Z78.9 IMPAIRED MOBILITY AND ADLS: Status: ACTIVE | Noted: 2017-04-04

## 2017-04-21 PROBLEM — Z98.61 S/P PTCA (PERCUTANEOUS TRANSLUMINAL CORONARY ANGIOPLASTY): Status: ACTIVE | Noted: 2017-03-01

## 2017-04-21 PROBLEM — E72.20 HYPERAMMONEMIA (HCC): Status: ACTIVE | Noted: 2017-04-18

## 2017-04-21 PROBLEM — I73.9 PERIPHERAL ARTERY DISEASE (HCC): Chronic | Status: ACTIVE | Noted: 2017-01-25

## 2017-04-21 PROBLEM — R79.89 ELEVATED TSH: Status: ACTIVE | Noted: 2017-04-06

## 2017-04-21 PROBLEM — E87.6 HYPOKALEMIA: Status: ACTIVE | Noted: 2017-04-04

## 2017-04-21 LAB
INR PPP: 2.7 (ref 0.8–1.2)
PROTHROMBIN TIME: 27.5 SEC (ref 11.5–15.2)

## 2017-04-21 PROCEDURE — 97530 THERAPEUTIC ACTIVITIES: CPT

## 2017-04-21 PROCEDURE — 97535 SELF CARE MNGMENT TRAINING: CPT

## 2017-04-21 PROCEDURE — 74011250637 HC RX REV CODE- 250/637: Performed by: PHYSICIAN ASSISTANT

## 2017-04-21 PROCEDURE — 74011000258 HC RX REV CODE- 258: Performed by: SURGERY

## 2017-04-21 PROCEDURE — 36415 COLL VENOUS BLD VENIPUNCTURE: CPT | Performed by: PHYSICIAN ASSISTANT

## 2017-04-21 PROCEDURE — 74011250637 HC RX REV CODE- 250/637: Performed by: INTERNAL MEDICINE

## 2017-04-21 PROCEDURE — 85610 PROTHROMBIN TIME: CPT | Performed by: PHYSICIAN ASSISTANT

## 2017-04-21 PROCEDURE — 74011250636 HC RX REV CODE- 250/636: Performed by: SURGERY

## 2017-04-21 PROCEDURE — 74011250637 HC RX REV CODE- 250/637: Performed by: SURGERY

## 2017-04-21 PROCEDURE — 65310000000 HC RM PRIVATE REHAB

## 2017-04-21 RX ORDER — AMIODARONE HYDROCHLORIDE 200 MG/1
200 TABLET ORAL 2 TIMES DAILY
Status: DISCONTINUED | OUTPATIENT
Start: 2017-05-03 | End: 2017-04-29 | Stop reason: HOSPADM

## 2017-04-21 RX ORDER — DULOXETIN HYDROCHLORIDE 60 MG/1
60 CAPSULE, DELAYED RELEASE ORAL DAILY
Status: DISCONTINUED | OUTPATIENT
Start: 2017-04-22 | End: 2017-04-29 | Stop reason: HOSPADM

## 2017-04-21 RX ORDER — WARFARIN 1 MG/1
1 TABLET ORAL ONCE
Status: COMPLETED | OUTPATIENT
Start: 2017-04-21 | End: 2017-04-21

## 2017-04-21 RX ORDER — DILTIAZEM HYDROCHLORIDE 120 MG/1
120 CAPSULE, COATED, EXTENDED RELEASE ORAL DAILY
Qty: 30 CAP | Refills: 6 | Status: SHIPPED | OUTPATIENT
Start: 2017-04-21 | End: 2017-04-29

## 2017-04-21 RX ORDER — ZINC SULFATE 50(220)MG
1 CAPSULE ORAL DAILY
Status: DISCONTINUED | OUTPATIENT
Start: 2017-04-22 | End: 2017-04-29 | Stop reason: HOSPADM

## 2017-04-21 RX ORDER — OXYCODONE AND ACETAMINOPHEN 10; 325 MG/1; MG/1
1 TABLET ORAL
Status: DISCONTINUED | OUTPATIENT
Start: 2017-04-21 | End: 2017-04-29 | Stop reason: HOSPADM

## 2017-04-21 RX ORDER — AMIODARONE HYDROCHLORIDE 400 MG/1
400 TABLET ORAL 2 TIMES DAILY
Qty: 62 TAB | Refills: 3 | Status: SHIPPED | OUTPATIENT
Start: 2017-04-21 | End: 2017-04-29

## 2017-04-21 RX ORDER — ACETAMINOPHEN 325 MG/1
650 TABLET ORAL
Status: DISCONTINUED | OUTPATIENT
Start: 2017-04-21 | End: 2017-04-29 | Stop reason: HOSPADM

## 2017-04-21 RX ORDER — DILTIAZEM HYDROCHLORIDE 120 MG/1
120 CAPSULE, COATED, EXTENDED RELEASE ORAL DAILY
Status: DISCONTINUED | OUTPATIENT
Start: 2017-04-22 | End: 2017-04-25

## 2017-04-21 RX ORDER — ATORVASTATIN CALCIUM 40 MG/1
40 TABLET, FILM COATED ORAL
Status: DISCONTINUED | OUTPATIENT
Start: 2017-04-22 | End: 2017-04-29 | Stop reason: HOSPADM

## 2017-04-21 RX ORDER — GUAIFENESIN 100 MG/5ML
81 LIQUID (ML) ORAL
Status: DISCONTINUED | OUTPATIENT
Start: 2017-04-22 | End: 2017-04-29 | Stop reason: HOSPADM

## 2017-04-21 RX ORDER — GABAPENTIN 100 MG/1
100 CAPSULE ORAL 2 TIMES DAILY
Status: DISCONTINUED | OUTPATIENT
Start: 2017-04-21 | End: 2017-04-29 | Stop reason: HOSPADM

## 2017-04-21 RX ORDER — METOPROLOL TARTRATE 25 MG/1
25 TABLET, FILM COATED ORAL EVERY 12 HOURS
Status: DISCONTINUED | OUTPATIENT
Start: 2017-04-21 | End: 2017-04-25

## 2017-04-21 RX ORDER — ATORVASTATIN CALCIUM 40 MG/1
80 TABLET, FILM COATED ORAL
Status: DISCONTINUED | OUTPATIENT
Start: 2017-04-21 | End: 2017-04-21

## 2017-04-21 RX ORDER — UREA 10 %
2 LOTION (ML) TOPICAL 2 TIMES DAILY
Status: DISCONTINUED | OUTPATIENT
Start: 2017-04-21 | End: 2017-04-29 | Stop reason: HOSPADM

## 2017-04-21 RX ORDER — CLOPIDOGREL BISULFATE 75 MG/1
75 TABLET ORAL
Status: DISCONTINUED | OUTPATIENT
Start: 2017-04-21 | End: 2017-04-29 | Stop reason: HOSPADM

## 2017-04-21 RX ORDER — LOSARTAN POTASSIUM 25 MG/1
25 TABLET ORAL DAILY
Status: DISCONTINUED | OUTPATIENT
Start: 2017-04-22 | End: 2017-04-25

## 2017-04-21 RX ORDER — BISACODYL 5 MG
10 TABLET, DELAYED RELEASE (ENTERIC COATED) ORAL
Status: DISCONTINUED | OUTPATIENT
Start: 2017-04-21 | End: 2017-04-29 | Stop reason: HOSPADM

## 2017-04-21 RX ORDER — WARFARIN 2.5 MG/1
2.5 TABLET ORAL DAILY
Qty: 31 TAB | Status: ON HOLD | OUTPATIENT
Start: 2017-04-21 | End: 2017-04-28

## 2017-04-21 RX ORDER — METOPROLOL TARTRATE 25 MG/1
25 TABLET, FILM COATED ORAL EVERY 12 HOURS
Qty: 62 TAB | Refills: 6 | Status: SHIPPED | OUTPATIENT
Start: 2017-04-21 | End: 2017-04-29

## 2017-04-21 RX ORDER — DOCUSATE SODIUM 100 MG/1
100 CAPSULE, LIQUID FILLED ORAL 2 TIMES DAILY
Status: DISCONTINUED | OUTPATIENT
Start: 2017-04-21 | End: 2017-04-29 | Stop reason: HOSPADM

## 2017-04-21 RX ORDER — OXYCODONE AND ACETAMINOPHEN 10; 325 MG/1; MG/1
1 TABLET ORAL
Qty: 80 TAB | Refills: 0 | Status: SHIPPED | OUTPATIENT
Start: 2017-04-21 | End: 2017-04-29

## 2017-04-21 RX ORDER — AMIODARONE HYDROCHLORIDE 200 MG/1
400 TABLET ORAL 2 TIMES DAILY
Status: DISCONTINUED | OUTPATIENT
Start: 2017-04-21 | End: 2017-04-29 | Stop reason: HOSPADM

## 2017-04-21 RX ADMIN — LACTOBACILLUS TAB 2 TABLET: TAB at 17:55

## 2017-04-21 RX ADMIN — OXYCODONE HYDROCHLORIDE AND ACETAMINOPHEN 1 TABLET: 10; 325 TABLET ORAL at 21:55

## 2017-04-21 RX ADMIN — DOCUSATE SODIUM 100 MG: 100 CAPSULE, LIQUID FILLED ORAL at 17:56

## 2017-04-21 RX ADMIN — THERA TABS 1 TABLET: TAB at 08:40

## 2017-04-21 RX ADMIN — OXYCODONE HYDROCHLORIDE AND ACETAMINOPHEN 1 TABLET: 10; 325 TABLET ORAL at 17:56

## 2017-04-21 RX ADMIN — CLOPIDOGREL BISULFATE 75 MG: 75 TABLET, FILM COATED ORAL at 17:55

## 2017-04-21 RX ADMIN — AMIODARONE HYDROCHLORIDE 400 MG: 200 TABLET ORAL at 08:39

## 2017-04-21 RX ADMIN — ATORVASTATIN CALCIUM 80 MG: 40 TABLET, FILM COATED ORAL at 20:43

## 2017-04-21 RX ADMIN — METOPROLOL TARTRATE 25 MG: 25 TABLET ORAL at 08:39

## 2017-04-21 RX ADMIN — OXYCODONE HYDROCHLORIDE AND ACETAMINOPHEN 1 TABLET: 10; 325 TABLET ORAL at 08:40

## 2017-04-21 RX ADMIN — WARFARIN SODIUM 1 MG: 1 TABLET ORAL at 17:56

## 2017-04-21 RX ADMIN — AMIODARONE HYDROCHLORIDE 400 MG: 200 TABLET ORAL at 18:00

## 2017-04-21 RX ADMIN — Medication 1000 MCG: at 08:40

## 2017-04-21 RX ADMIN — ATORVASTATIN CALCIUM 40 MG: 40 TABLET, FILM COATED ORAL at 08:39

## 2017-04-21 RX ADMIN — CLOPIDOGREL 75 MG: 75 TABLET, FILM COATED ORAL at 08:39

## 2017-04-21 RX ADMIN — DILTIAZEM HYDROCHLORIDE 120 MG: 120 CAPSULE, COATED, EXTENDED RELEASE ORAL at 08:40

## 2017-04-21 RX ADMIN — PIPERACILLIN SODIUM,TAZOBACTAM SODIUM 3.38 G: 3; .375 INJECTION, POWDER, FOR SOLUTION INTRAVENOUS at 05:42

## 2017-04-21 RX ADMIN — Medication 10 ML: at 06:00

## 2017-04-21 RX ADMIN — GABAPENTIN 100 MG: 100 CAPSULE ORAL at 20:43

## 2017-04-21 RX ADMIN — METOPROLOL TARTRATE 25 MG: 25 TABLET ORAL at 20:42

## 2017-04-21 NOTE — IP AVS SNAPSHOT
Summary of Care Report The Summary of Care report has been created to help improve care coordination. Users with access to [employer] or [address] (Web-based application) may access additional patient information including the Discharge Summary. If you are not currently a [address] user and need more information, please call the number listed below in the [place] [address] section and ask to be connected with Medical Records. Facility Information Name Address Phone [address] [place] [address] [address] [facility] [address] [phone] Patient Information Patient Name Sex KALI Moreno Situ ([id number]) Male 1960 Discharge Information Admitting Provider Service Area Unit Joyce Lagunas MD / [address] [facility] / [phone] Discharge Provider Discharge Date/Time Discharge Disposition Destination (none) 2017 (Pending) [facility] (none) Patient Language Language ENGLISH [13] Hospital Problems as of 2017  Reviewed: 2017 12:01 PM by Joyce Lagunas MD  
  
  
  
 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 [address] (Chronic)  2015 - Present 2017 by Joyce Lagunas MD Yes Entered by Susan Yu MD  
  Aortoiliac occlusive disease [facility]) (Chronic)  2017 - Present 2017 by Joyce Lagunas MD Yes Entered by Sade Hart MD  
  Atherosclerosis of native artery of both lower extremities with intermittent claudication ([facility] [address] (Chronic)  2017 - Present 2017 by Joyce Lagunas MD Yes Entered by Sade Hart MD  
  Peripheral artery disease [facility]) (Chronic)  2017 - Present 2017 by Joyce Lagunas MD Yes   Entered by Sade Hart MD  
 Chronic atrial fibrillation (HCC) (Chronic)  Unknown - Present 4/21/2017 by Riya Noe MD Yes Entered by Riky Alvarenga MD  
  Acute blood loss as cause of postoperative anemia  4/4/2017 - Present 4/21/2017 by Riya Noe MD Yes Entered by Riya Noe MD  
  Impaired mobility and ADLs  4/4/2017 - Present 4/21/2017 by Riya Noe MD Yes Entered by Riya Noe MD  
  Anticoagulated on Coumadin (Chronic)  Unknown - Present 4/21/2017 by Riya Noe MD Yes Entered by iRya Noe MD  
  Hyperuricemia (Chronic)  Unknown - Present 4/21/2017 by Riya Noe MD Yes Entered by Riya Noe MD  
  Euthyroid sick syndrome  4/6/2017 - Present 4/23/2017 by Riya Noe MD Yes Entered by Riya Noe MD  
  Aftercare following surgery of the circulatory system  4/21/2017 - Present 4/21/2017 by Riya Noe MD Yes Entered by Riya Noe MD  
  Overview Signed 4/21/2017  2:32 PM by Riya Noe MD  
   S/P Right axillary to bifemoral artery bypass using an 8 mm Propaten graft from the right axilla to the right common femoral artery with a jump femoral-femoral bypass with another 8 mm Propaten external ring bypass; Right common femoral artery, and profunda femoris artery and superficial femoral artery endarterectomy causing an extensive surgery on the right side (4/4/2017 - Dr. Lisa Orozco) S/P Cutdown of femoral-femoral bypass, more on the left groin side; Right lower extremity angiography with first-order catheterization (4/7/2017 - Dr. Lisa Orozco) S/P Right femoral to above-knee popliteal bypass with an 8 mm PTFE graft (4/10/2017 - Dr. Dyllan Hanson) * (Principal)Status post femoral-popliteal bypass surgery  4/21/2017 - Present 4/21/2017 by Riya Noe MD Yes   Entered by Riya Noe MD  
  Overview Signed 4/21/2017  2:33 PM by Riya Noe MD  
 S/P Right axillary to bifemoral artery bypass using an 8 mm Propaten graft from the right axilla to the right common femoral artery with a jump femoral-femoral bypass with another 8 mm Propaten external ring bypass; Right common femoral artery, and profunda femoris artery and superficial femoral artery endarterectomy causing an extensive surgery on the right side (4/4/2017 - Dr. Jono Ruzi) S/P Cutdown of femoral-femoral bypass, more on the left groin side; Right lower extremity angiography with first-order catheterization (4/7/2017 - Dr. Jono Ruiz) S/P Right femoral to above-knee popliteal bypass with an 8 mm PTFE graft (4/10/2017 - Dr. Land) History of cardioversion  4/18/2017 - Present 4/21/2017 by Hayden Carter MD Yes Entered by Hayden Carter MD  
  Overview Signed 4/21/2017  2:34 PM by Hayden Carter MD  
   S/P Synchronized external cardioversion (4/18/2017 - Dr. oLbo Murrieta) Critical ischemia of lower extremity  4/10/2017 - Present 4/21/2017 by Hayden Carter MD No  
  Entered by Hayden Carter MD  
  Overview Signed 4/21/2017  4:41 PM by Hayden Carter MD  
   Right lower limb Chronic ulcer of right foot ([facility] [address] (Chronic)  4/4/2017 - Present 4/21/2017 by Hayden Carter MD Yes Entered by Hayden Carter MD  
  Vitamin D deficiency (Chronic)  4/22/2017 - Present 4/22/2017 by Hayden Carter MD Yes Entered by Hayden Carter MD  
  Overview Signed 4/22/2017 12:56 PM by Hayden Carter MD  
   Vitamin D 25-Hydroxy (4/22/2017) = 12.0 Non-Hospital Problems as of 4/29/2017  Reviewed: 4/26/2017 12:01 PM by Hayden Carter MD  
  
  
  
 Class Noted - Resolved Last Modified Active Problems   Vitamin B12 deficiency  1/26/2015 - Present 1/26/2015 by Devonte Jacobson MD  
  Entered by Devonte Jacobson MD  
  DDD (degenerative disc disease), lumbar (Chronic)  1/26/2015 - Present 4/21/2017 by Hayden Carter MD  
 Entered by Kun Bowers MD  
  Erectile dysfunction (Chronic)  7/5/2016 - Present 4/21/2017 by Cherelle Dukes MD  
  Entered by Kun Bowers MD  
  Spinal stenosis of lumbar region with radiculopathy (Chronic)  5/4/2015 - Present 4/21/2017 by Cherelle Dukes MD  
  Entered by Kumar Mata Overview Signed 4/21/2017  2:35 PM by MD Dr. Vivek Alexander Hereditary peripheral neuropathy (Chronic)  11/15/2016 - Present 4/21/2017 by Cherelle Dukes MD  
  Entered by Kumar Mata Coronary artery disease involving native coronary artery of native heart (Chronic)  3/15/2017 - Present 4/21/2017 by Cherelle Dukes MD  
  Entered by JUNE Almonte Overview Signed 3/15/2017  2:19 PM by JUNE Almonte Successful stenting of Cx (Xience LESLEY) and RCA (Xience LESLEY) to 0% by Dr. Loren Ryan on 3/15/17.  
  
  
  Transient alteration of awareness  4/18/2017 - Present 4/21/2017 by Cherelle Dukes MD  
  Entered by Gladis Marcelino MD  
  Ischemic cardiomyopathy (Chronic)  Unknown - Present 4/21/2017 by Cherelle Dukes MD  
  Entered by Cherelle Dukes MD  
  Chronic systolic heart failure [facility]) (Chronic)  Unknown - Present 4/21/2017 by Cherelle Dukes MD  
  Entered by Cherelle Dukes MD  
  Carotid artery disease [facility]) (Chronic)  Unknown - Present 4/21/2017 by Cherelle Dukes MD  
  Entered by Cherelle Dukes MD  
  Hypomagnesemia  4/6/2017 - Present 4/21/2017 by Cherelle Dukes MD  
  Entered by Cherelle Dukes MD  
  Elevated creatine kinase level  4/5/2017 - Present 4/21/2017 by Cherelle Dukes MD  
  Entered by Cherelle Dukes MD  
  Hypokalemia  4/4/2017 - Present 4/21/2017 by Cherelle Dukes MD  
  Entered by Cherelle Dukes MD  
  Hyperammonemia ([address]  4/18/2017 - Present 4/21/2017 by Cherelle Dukes MD  
  Entered by Cherelle Dukes MD  
  Dyslipidemia (Chronic)  Unknown - Present 4/21/2017 by Cherelle Dukes MD  
  Entered by Cherelle Dukes MD  
  
 You are allergic to the following No active allergies Current Discharge Medication List  
  
START taking these medications Dose & Instructions Dispensing Information Comments  
 ascorbic acid (vitamin C) 250 mg tablet Commonly known as:  VITAMIN C Dose:  250 mg Take 1 Tab by mouth daily (with breakfast). Quantity:  30 Tab Refills:  0  
   
 bisacodyl 5 mg EC tablet Commonly known as:  DULCOLAX Dose:  10 mg Take 2 Tabs by mouth every forty-eight (48) hours as needed for Constipation. Indications: Constipation Quantity:  14 Tab Refills:  0  
   
 cholecalciferol 1,000 unit tablet Commonly known as:  VITAMIN D3 Dose:  1000 Units Take 1 Tab by mouth daily. Quantity:  30 Tab Refills:  0  
   
 dilTIAZem 30 mg tablet Commonly known as:  CARDIZEM Replaces:  dilTIAZem  mg ER capsule Dose:  30 mg Take 1 Tab by mouth Before breakfast, lunch, dinner and at bedtime. Indications: hypertension Quantity:  120 Tab Refills:  0  
   
 docusate sodium 100 mg capsule Commonly known as:  Jazmin Humberto Dose:  100 mg Take 1 Cap by mouth two (2) times a day for 90 days. Indications: Constipation Quantity:  60 Cap Refills:  0  
   
 ferrous sulfate 325 mg (65 mg iron) tablet Dose:  325 mg Take 1 Tab by mouth daily (with breakfast). Quantity:  30 Tab Refills:  0  
   
 gabapentin 100 mg capsule Commonly known as:  NEURONTIN Dose:  100 mg Take 1 Cap by mouth two (2) times a day. Indications: NEUROPATHIC PAIN Quantity:  60 Cap Refills:  0  
   
 * OTHER Check PT, INR on 5/1/17- results to Cardiologist immediately. Diagnosis- A Fib Quantity:  1 Each Refills:  0  
   
 * OTHER Check CBC, CMP, Mg on 5/1/17. Results to PCP immediately. Diagnosis- PAD Quantity:  1 Each Refills:  0  
   
 * OTHER Incentive Spirometry- Use as directed Quantity:  1 Each Refills:  0  
   
 zinc sulfate 220 (50) mg capsule Commonly known as:  ZINCATE Dose:  220 mg Take 1 Cap by mouth daily. Quantity:  30 Cap Refills:  0  
   
 * Notice: This list has 3 medication(s) that are the same as other medications prescribed for you. Read the directions carefully, and ask your doctor or other care provider to review them with you. CONTINUE these medications which have CHANGED Dose & Instructions Dispensing Information Comments * amiodarone 400 mg tablet Commonly known as:  Sana Abts What changed:  additional instructions Dose:  400 mg Take 1 Tab by mouth two (2) times a day. LAST DOSE in the evening of 5/2/17. From 5/3/17 start 200 mg PO BID ( given as a separate prescription ) Quantity:  7 Tab Refills:  0  
   
 * amiodarone 200 mg tablet Commonly known as:  CORDARONE Start taking on:  5/3/2017 What changed: You were already taking a medication with the same name, and this prescription was added. Make sure you understand how and when to take each. Dose:  200 mg Take 1 Tab by mouth two (2) times a day. 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  
   
 aspirin 81 mg chewable tablet What changed:  when to take this Dose:  81 mg Take 1 Tab by mouth daily (with breakfast). Quantity:  30 Tab Refills:  0  
   
 atorvastatin 40 mg tablet Commonly known as:  LIPITOR What changed:  when to take this Dose:  40 mg Take 1 Tab by mouth nightly. Indications: DYSLIPIDEMIA Quantity:  30 Tab Refills:  0  
   
 clopidogrel 75 mg Tab Commonly known as:  PLAVIX What changed:  when to take this Dose:  75 mg Take 1 Tab by mouth daily (with dinner). Quantity:  30 Tab Refills:  0  
   
 losartan 25 mg tablet Commonly known as:  COZAAR What changed:   
- medication strength 
- how much to take Dose:  25 mg Take 1 Tab by mouth daily.  Indications: Chronic Heart Failure, hypertension Quantity:  30 Tab Refills:  0  
   
 metoprolol tartrate 25 mg tablet Commonly known as:  LOPRESSOR What changed:  how much to take Dose:  12.5 mg Take 0.5 Tabs by mouth every twelve (12) hours. Indications: hypertension, VENTRICULAR RATE CONTROL IN ATRIAL FIBRILLATION Quantity:  30 Tab Refills:  0  
   
 oxyCODONE-acetaminophen  mg per tablet Commonly known as:  PERCOCET 10 What changed:  reasons to take this Dose:  1 Tab Take 1 Tab by mouth every four (4) hours as needed (for pain level greater than 4/10). Max Daily Amount: 6 Tabs. Indications: Pain Quantity:  20 Tab Refills:  0  
   
 warfarin 2.5 mg tablet Commonly known as:  COUMADIN What changed:   
- how much to take 
- how to take this - when to take this 
- additional instructions 2.5 mg po daily through 5/1/17 and then as per [facility] Quantity:  30 Tab Refills:  0  
   
 * Notice: This list has 2 medication(s) that are the same as other medications prescribed for you. Read the directions carefully, and ask your doctor or other care provider to review them with you. CONTINUE these medications which have NOT CHANGED Dose & Instructions Dispensing Information Comments  
 cyanocobalamin 1,000 mcg tablet Commonly known as:  VITAMIN B-12 Dose:  1000 mcg Take 1 Tab by mouth daily. Quantity:  90 Tab Refills:  3 DULoxetine 60 mg capsule Commonly known as:  CYMBALTA Dose:  60 mg Take 1 Cap by mouth daily. Quantity:  30 Cap Refills:  0 STOP taking these medications Comments  
 amLODIPine 10 mg tablet Commonly known as:  NORVASC  
   
   
 avanafil 100 mg tablet Commonly known as:  STENDRA  
   
   
 dilTIAZem  mg ER capsule Commonly known as:  CARDIZEM CD Replaced by:  dilTIAZem 30 mg tablet  
   
   
 hydroCHLOROthiazide 25 mg tablet Commonly known as:  HYDRODIURIL  
   
   
 POTASSIUM PHOSPHATE PO  
   
   
  
  
  
 Current Immunizations Name Date Pneumococcal Polysaccharide (PPSV-23)  Deferred () Tdap 7/5/2016 Follow-up Information Follow up With Details Comments Contact Info [address] [facility] [address] [facility] [address] 
[phone] [place], [address] [facility] [address] [facility] 
[phone] Discharge Instructions The following personal items are in your possession at time of discharge: 
 
Dental Appliances: Lowers, Uppers Visual Aid: None Home Medications: Kept at bedside Jewelry: Moises Shreyas Clothing: Shirt, Footwear, Socks, Undergarments, Pants Other Valuables: Cell Phone Personal Items Sent to Safe: none PATIENT INSTRUCTIONS: 
 
 
F-face looks uneven A-arms unable to move or move unevenly S-speech slurred or non-existent T-time-call 911 as soon as signs and symptoms begin-DO NOT go Back to bed or wait to see if you get better-TIME IS BRAIN. Warning Signs of HEART ATTACK Call 911 if you have these symptoms: 
? Chest discomfort. Most heart attacks involve discomfort in the center of the chest that lasts more than a few minutes, or that goes away and comes back. It can feel like uncomfortable pressure, squeezing, fullness, or pain. ? Discomfort in other areas of the upper body. Symptoms can include pain or discomfort in one or both arms, the back, neck, jaw, or stomach. ? Shortness of breath with or without chest discomfort. ? Other signs may include breaking out in a cold sweat, nausea, or lightheadedness. Don't wait more than five minutes to call [address]! Fast action can save your life. Calling 911 is almost always the fastest way to get lifesaving treatment. Emergency Medical Services staff can begin treatment when they arrive  up to an hour sooner than if someone gets to the hospital by car. The discharge information has been reviewed with the patient. The patient verbalized understanding. Discharge medications reviewed with the patient and appropriate educational materials and side effects teaching were provided. Patient armband removed and shredded MyChart Activation Thank you for requesting access to [employer]. Please follow the instructions below to securely access and download your online medical record. [employer] allows you to send messages to your doctor, view your test results, renew your prescriptions, schedule appointments, and more. How Do I Sign Up? 1. In your internet browser, go to [email or web address] 
2. Click on the First Time User? Click Here link in the Sign In box. You will be redirect to the New Member Sign Up page. 3. Enter your [employer] Access Code exactly as it appears below. You will not need to use this code after youve completed the sign-up process. If you do not sign up before the expiration date, you must request a new code. [employer] Access Code: Activation code not generated Current [employer] Status: Patient Declined (This is the date your [employer] access code will ) 4. Enter the last four digits of your Social Security Number (xxxx) and Date of Birth (mm/dd/yyyy) as indicated and click Submit. You will be taken to the next sign-up page. 5. Create a [employer] ID. This will be your [employer] login ID and cannot be changed, so think of one that is secure and easy to remember. 6. Create a [employer] password. You can change your password at any time. 7. Enter your Password Reset Question and Answer. This can be used at a later time if you forget your password. 8. Enter your e-mail address. You will receive e-mail notification when new information is available in [address] 9. Click Sign Up. You can now view and download portions of your medical record. 10. Click the Download Summary menu link to download a portable copy of your medical information. Additional Information If you have questions, please visit the Frequently Asked Questions section of the [email or web address] website at [email or web address] Remember, MyChart is NOT to be used for urgent needs. For medical emergencies, dial 911. Diet- Cardiac, Ensure Enlive po three times daily with meals Activity - as tolerated FALL PRECAUTIONS 
ASPIRATION PRECAUTIONS DECUBITUS PRECAUTIONS Wound care as per vascular Incentive Spirometry Q30 minutes when awake PT, OT Evaluate and Treat Check CBC, CMP, Mg in 3 days Check PT, INR in 3 days F/u with PCP in 1 week F/u with vascular in 10 days F/u with Cardiologist in 2 weeks Chart Review Routing History No Routing History on File

## 2017-04-21 NOTE — DISCHARGE SUMMARY
Physician Discharge Summary     Patient ID:  Mariella Chan  [id number]  64 y.o.  1960    Admit date: 4/4/2017    Discharge date: 4/21/2017      Admitting Physician: Catia Gilman MD     Discharge Physician: Catia Gilman MD     Admission Diagnoses: Atheroscler of native artery of both legs with intermit cla*    Discharge Diagnoses: There are no discharge diagnoses documented for the most recent discharge. Procedures for this admission: Procedure(s): FEMORAL-POPLITEAL BYPASS/WITH GRAFT RIGHT LEG    Discharged Condition: stable    Hospital Course: admitted after right axillary to fem-fem bypass. Overall doing well after surgery but then had continued right leg pain. Had angiogram with occluded SFA attempted stenting but unable to so underwent right fem-pop bypass with rapid improvement. Had issues of afib and treated by cardiology eventually with cardioversion. Had some issues of mental status changes ultimately no known cause some thoughts were DTs and ICU psychosis. In either event that also improved. Ready for transfer to rehab. Consults: Cardiology, Nephrology and Hospitalist    Significant Diagnostic Studies: none    Treatments: IV hydration, analgesia: Morphine, procedures: cardioversion and surgery: right ax-bifem and right fem-pop    Discharge Exam: GEN: alert, cooperative, no distress, appears stated age  LUNG: clear to auscultation bilaterally  HEART: regular rate and rhythm, S1, S2 normal, no murmur, click, rub or gallop  ABDOMEN:  no change  EXTREMITIES:   right lower extremity  extremities normal, atraumatic, no cyanosis or edema and left lower extremity  extremities normal, atraumatic, no cyanosis or edema    Disposition: home    Patient Instructions:   Current Discharge Medication List      START taking these medications    Details   amiodarone (PACERONE) 400 mg tablet Take 1 Tab by mouth two (2) times a day.   Qty: 62 Tab, Refills: 3      dilTIAZem CD (CARDIZEM CD) 120 mg ER capsule Take 1 Cap by mouth daily. Qty: 30 Cap, Refills: 6      oxyCODONE-acetaminophen (PERCOCET 10)  mg per tablet Take 1 Tab by mouth every four (4) hours as needed. Max Daily Amount: 6 Tabs. Qty: 80 Tab, Refills: 0      warfarin (COUMADIN) 2.5 mg tablet Take 1 Tab by mouth daily. Qty: 31 Tab, Refills: prn         CONTINUE these medications which have CHANGED    Details   metoprolol tartrate (LOPRESSOR) 25 mg tablet Take 1 Tab by mouth every twelve (12) hours. Qty: 62 Tab, Refills: 6         CONTINUE these medications which have NOT CHANGED    Details   atorvastatin (LIPITOR) 40 mg tablet Take 1 Tab by mouth daily. Qty: 90 Tab, Refills: 0      clopidogrel (PLAVIX) 75 mg tab Take 1 Tab by mouth daily. Qty: 30 Tab, Refills: 11      aspirin 81 mg chewable tablet Take 81 mg by mouth daily. amLODIPine (NORVASC) 10 mg tablet Take 1 Tab by mouth daily. Qty: 90 Tab, Refills: 3      cyanocobalamin (VITAMIN B-12) 1,000 mcg tablet Take 1 Tab by mouth daily. Qty: 90 Tab, Refills: 3      hydrochlorothiazide (HYDRODIURIL) 25 mg tablet Take 1 Tab by mouth daily. Qty: 90 Tab, Refills: 3      losartan (COZAAR) 100 mg tablet Take 1 Tab by mouth daily. Qty: 90 Tab, Refills: 3      POTASSIUM PHOS,M-BASIC-D-BASIC (POTASSIUM PHOSPHATE PO) Take  by mouth. Take as directed      DULoxetine (CYMBALTA) 60 mg capsule Take 1 Cap by mouth daily. Qty: 30 Cap, Refills: 0      avanafil (STENDRA) 100 mg tablet Take 1 Tab by mouth as needed for Other. Take 1 tab 15-30 mins prior to sexual acitivity  Qty: 12 Tab, Refills: 0         STOP taking these medications       oxyCODONE-acetaminophen (PERCOCET) 5-325 mg per tablet Comments:   Reason for Stopping:               Reference discharge instructions as provided by nursing for diet, labs, medications, activity, wound care and any outpatient referrals. Follow-up with 2 weeks with Dr. Aliyah Pantoja. Signed:   Nader Noel MD  4/21/2017  9:44 AM

## 2017-04-21 NOTE — PROGRESS NOTES
Patient was accepted to ARU, room 179 at 3 PM. Dc summary to be ready prior to 3 PM. Primary nurse to call report to [phone].    9:44 AM notified pt's attending.

## 2017-04-21 NOTE — IP AVS SNAPSHOT
[address] Patient: Meliton Fox MRN: [id number] EQF:4[birth year]00/[birth year]46 You are allergic to the following No active allergies Immunizations Administered for This Admission Name Date Pneumococcal Polysaccharide (PPSV-23)  Deferred () Recent Documentation Smoking Status Former Smoker Emergency Contacts Name Discharge Info Relation Home Work Mobile Lynnette Caro DISCHARGE CAREGIVER [3] Daughter [21] [phone] About your hospitalization You were admitted on:  April 21, 2017 You last received care in the:  [facility] IP REHAB UNIT You were discharged on:  April 29, 2017 Unit phone number:  [phone] Why you were hospitalized Your primary diagnosis was:  Status Post Femoral-Popliteal Bypass Surgery Your diagnoses also included:  Acute Blood Loss As Cause Of Postoperative Anemia, Aftercare Following Surgery Of The Circulatory System, Anticoagulated On Coumadin, Aortoiliac Occlusive Disease (Hcc), 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), Benign Hypertensive Heart Disease With Systolic Congestive Heart Failure, Nyha Class 2 (Hcc), Chronic Atrial Fibrillation (Hcc), Euthyroid Sick Syndrome, History Of Cardioversion, Hyperuricemia, Impaired Mobility And Adls, Peripheral Artery Disease (Hcc), Critical Ischemia Of Lower Extremity, Chronic Ulcer Of Right Foot (Hcc), Vitamin D Deficiency Providers Seen During Your Hospitalizations Provider Role Specialty Primary office phone Rajani Sloan MD Attending Provider Internal Medicine [phone] Your Primary Care Physician (PCP) Primary Care Physician Office Phone Office Fax Concha Florian [phone] Follow-up Information Follow up With Details Comments Contact Info [address] [facility] [phone] [facility] [address] 
[phone] Alejandro Aguirre, [address] [facility] [address] 706 [facility] 
[phone] Your Appointments Friday May 05, 2017  9:45 AM EDT ROUTINE CARE with Alejandro Aguirre MD  
[facility] ([address] [place] [address] Road) [address] 706 [facility]  
[phone] Friday May 05, 2017  3:15 PM EDT Office Visit with [facility] NURSE [facility] ([address] [place] [address] Road) [address] [facility] [address] 706 [facility]  
[phone] Wednesday May 17, 2017  1:20 PM EDT  
POST HOSPITAL with Andi Watts MD  
[facility] ([address] [place] [address]) [facility] [address] [address] [address] [phone] Current Discharge Medication List  
  
START taking these medications Dose & Instructions Dispensing Information Comments Morning Noon Evening Bedtime  
 ascorbic acid (vitamin C) 250 mg tablet Commonly known as:  VITAMIN C Your last dose was: Your next dose is:    
   
   
 Dose:  250 mg Take 1 Tab by mouth daily (with breakfast). Quantity:  30 Tab Refills:  0  
     
   
   
   
  
 bisacodyl 5 mg EC tablet Commonly known as:  DULCOLAX Your last dose was: Your next dose is:    
   
   
 Dose:  10 mg Take 2 Tabs by mouth every forty-eight (48) hours as needed for Constipation. Indications: Constipation Quantity:  14 Tab Refills:  0  
     
   
   
   
  
 cholecalciferol 1,000 unit tablet Commonly known as:  VITAMIN D3 Your last dose was: Your next dose is:    
   
   
 Dose:  1000 Units Take 1 Tab by mouth daily. Quantity:  30 Tab Refills:  0  
     
   
   
   
  
 dilTIAZem 30 mg tablet Commonly known as:  CARDIZEM Replaces:  dilTIAZem  mg ER capsule Your last dose was: Your next dose is:    
   
   
 Dose:  30 mg Take 1 Tab by mouth Before breakfast, lunch, dinner and at bedtime. Indications: hypertension Quantity:  120 Tab Refills:  0  
     
   
   
   
  
 docusate sodium 100 mg capsule Commonly known as:  Gwendel Laser Your last dose was: Your next dose is:    
   
   
 Dose:  100 mg Take 1 Cap by mouth two (2) times a day for 90 days. Indications: Constipation Quantity:  60 Cap Refills:  0  
     
   
   
   
  
 ferrous sulfate 325 mg (65 mg iron) tablet Your last dose was: Your next dose is:    
   
   
 Dose:  325 mg Take 1 Tab by mouth daily (with breakfast). Quantity:  30 Tab Refills:  0  
     
   
   
   
  
 gabapentin 100 mg capsule Commonly known as:  NEURONTIN Your last dose was: Your next dose is:    
   
   
 Dose:  100 mg Take 1 Cap by mouth two (2) times a day. Indications: NEUROPATHIC PAIN Quantity:  60 Cap Refills:  0  
     
   
   
   
  
 * OTHER Your last dose was: Your next dose is:    
   
   
 Check PT, INR on 5/1/17- results to Cardiologist immediately. Diagnosis- A Fib Quantity:  1 Each Refills:  0  
     
   
   
   
  
 * OTHER Your last dose was: Your next dose is:    
   
   
 Check CBC, CMP, Mg on 5/1/17. Results to PCP immediately. Diagnosis- PAD Quantity:  1 Each Refills:  0  
     
   
   
   
  
 * OTHER Your last dose was: Your next dose is:    
   
   
 Incentive Spirometry- Use as directed Quantity:  1 Each Refills:  0  
     
   
   
   
  
 zinc sulfate 220 (50) mg capsule Commonly known as:  ZINCATE Your last dose was: Your next dose is:    
   
   
 Dose:  220 mg Take 1 Cap by mouth daily. Quantity:  30 Cap Refills:  0  
     
   
   
   
  
 * Notice:   This list has 3 medication(s) that are the same as other medications prescribed for you. Read the directions carefully, and ask your doctor or other care provider to review them with you. CONTINUE these medications which have CHANGED Dose & Instructions Dispensing Information Comments Morning Noon Evening Bedtime * amiodarone 400 mg tablet Commonly known as:  Marcos De Jesus What changed:  additional instructions Your last dose was: Your next dose is:    
   
   
 Dose:  400 mg Take 1 Tab by mouth two (2) times a day. LAST DOSE in the evening of 5/2/17. From 5/3/17 start 200 mg PO BID ( given as a separate prescription ) Quantity:  7 Tab Refills:  0  
     
   
   
   
  
 * amiodarone 200 mg tablet Commonly known as:  CORDARONE Start taking on:  5/3/2017 What changed: You were already taking a medication with the same name, and this prescription was added. Make sure you understand how and when to take each. Your last dose was: Your next dose is:    
   
   
 Dose:  200 mg Take 1 Tab by mouth two (2) times a day. 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  
     
   
   
   
  
 aspirin 81 mg chewable tablet What changed:  when to take this Your last dose was: Your next dose is:    
   
   
 Dose:  81 mg Take 1 Tab by mouth daily (with breakfast). Quantity:  30 Tab Refills:  0  
     
   
   
   
  
 atorvastatin 40 mg tablet Commonly known as:  LIPITOR What changed:  when to take this Your last dose was: Your next dose is:    
   
   
 Dose:  40 mg Take 1 Tab by mouth nightly. Indications: DYSLIPIDEMIA Quantity:  30 Tab Refills:  0  
     
   
   
   
  
 clopidogrel 75 mg Tab Commonly known as:  PLAVIX What changed:  when to take this Your last dose was: Your next dose is:    
   
   
 Dose:  75 mg Take 1 Tab by mouth daily (with dinner). Quantity:  30 Tab Refills:  0  
     
   
   
   
  
 losartan 25 mg tablet Commonly known as:  COZAAR What changed:   
- medication strength 
- how much to take Your last dose was: Your next dose is:    
   
   
 Dose:  25 mg Take 1 Tab by mouth daily. Indications: Chronic Heart Failure, hypertension Quantity:  30 Tab Refills:  0  
     
   
   
   
  
 metoprolol tartrate 25 mg tablet Commonly known as:  LOPRESSOR What changed:  how much to take Your last dose was: Your next dose is:    
   
   
 Dose:  12.5 mg Take 0.5 Tabs by mouth every twelve (12) hours. Indications: hypertension, VENTRICULAR RATE CONTROL IN ATRIAL FIBRILLATION Quantity:  30 Tab Refills:  0  
     
   
   
   
  
 oxyCODONE-acetaminophen  mg per tablet Commonly known as:  PERCOCET 10 What changed:  reasons to take this Your last dose was: Your next dose is:    
   
   
 Dose:  1 Tab Take 1 Tab by mouth every four (4) hours as needed (for pain level greater than 4/10). Max Daily Amount: 6 Tabs. Indications: Pain Quantity:  20 Tab Refills:  0  
     
   
   
   
  
 warfarin 2.5 mg tablet Commonly known as:  COUMADIN What changed:   
- how much to take 
- how to take this - when to take this 
- additional instructions Your last dose was: Your next dose is:    
   
   
 2.5 mg po daily through 5/1/17 and then as per [facility] Quantity:  30 Tab Refills:  0  
     
   
   
   
  
 * Notice: This list has 2 medication(s) that are the same as other medications prescribed for you. Read the directions carefully, and ask your doctor or other care provider to review them with you. CONTINUE these medications which have NOT CHANGED Dose & Instructions Dispensing Information Comments Morning Noon Evening Bedtime  
 cyanocobalamin 1,000 mcg tablet Commonly known as:  VITAMIN B-12 Your last dose was: Your next dose is:    
   
   
 Dose:  1000 mcg Take 1 Tab by mouth daily. Quantity:  90 Tab Refills:  3 DULoxetine 60 mg capsule Commonly known as:  CYMBALTA Your last dose was: Your next dose is:    
   
   
 Dose:  60 mg Take 1 Cap by mouth daily. Quantity:  30 Cap Refills:  0 STOP taking these medications   
 amLODIPine 10 mg tablet Commonly known as:  NORVASC  
   
  
 avanafil 100 mg tablet Commonly known as:  STENDRA  
   
  
 dilTIAZem  mg ER capsule Commonly known as:  CARDIZEM CD Replaced by:  dilTIAZem 30 mg tablet  
   
  
 hydroCHLOROthiazide 25 mg tablet Commonly known as:  HYDRODIURIL  
   
  
 POTASSIUM PHOSPHATE PO Where to Get Your Medications Information on where to get these meds will be given to you by the nurse or doctor. ! Ask your nurse or doctor about these medications  
  amiodarone 200 mg tablet  
 amiodarone 400 mg tablet  
 ascorbic acid (vitamin C) 250 mg tablet  
 aspirin 81 mg chewable tablet  
 atorvastatin 40 mg tablet  
 bisacodyl 5 mg EC tablet  
 cholecalciferol 1,000 unit tablet  
 clopidogrel 75 mg Tab  
 dilTIAZem 30 mg tablet  
 docusate sodium 100 mg capsule DULoxetine 60 mg capsule  
 ferrous sulfate 325 mg (65 mg iron) tablet  
 gabapentin 100 mg capsule  
 losartan 25 mg tablet  
 metoprolol tartrate 25 mg tablet OTHER  
 OTHER  
 OTHER  
 oxyCODONE-acetaminophen  mg per tablet  
 warfarin 2.5 mg tablet  
 zinc sulfate 220 (50) mg capsule Discharge Instructions The following personal items are in your possession at time of discharge: 
 
Dental Appliances: Lowers, Uppers Visual Aid: None Home Medications: Kept at bedside Jewelry: Rashmi Grady Clothing: Shirt, Footwear, Socks, Undergarments, Pants Other Valuables: Cell Phone Personal Items Sent to Safe: none PATIENT INSTRUCTIONS: 
 
 
F-face looks uneven A-arms unable to move or move unevenly S-speech slurred or non-existent T-time-call 911 as soon as signs and symptoms begin-DO NOT go Back to bed or wait to see if you get better-TIME IS BRAIN. Warning Signs of HEART ATTACK Call 911 if you have these symptoms: 
? Chest discomfort. Most heart attacks involve discomfort in the center of the chest that lasts more than a few minutes, or that goes away and comes back. It can feel like uncomfortable pressure, squeezing, fullness, or pain. ? Discomfort in other areas of the upper body. Symptoms can include pain or discomfort in one or both arms, the back, neck, jaw, or stomach. ? Shortness of breath with or without chest discomfort. ? Other signs may include breaking out in a cold sweat, nausea, or lightheadedness. Don't wait more than five minutes to call [address]! Fast action can save your life. Calling 911 is almost always the fastest way to get lifesaving treatment. Emergency Medical Services staff can begin treatment when they arrive  up to an hour sooner than if someone gets to the hospital by car. The discharge information has been reviewed with the patient. The patient verbalized understanding. Discharge medications reviewed with the patient and appropriate educational materials and side effects teaching were provided. Patient armband removed and shredded MyChart Activation Thank you for requesting access to [employer]. Please follow the instructions below to securely access and download your online medical record. [employer] allows you to send messages to your doctor, view your test results, renew your prescriptions, schedule appointments, and more. How Do I Sign Up? 1. In your internet browser, go to [email or web address] 
2. Click on the First Time User? Click Here link in the Sign In box. You will be redirect to the New Member Sign Up page. 3. Enter your V[employer] Access Code exactly as it appears below. You will not need to use this code after youve completed the sign-up process. If you do not sign up before the expiration date, you must request a new code. MyChart Access Code: Activation code not generated Current MYFX Status: Patient Declined (This is the date your MyChart access code will ) 4. Enter the last four digits of your Social Security Number (xxxx) and Date of Birth (mm/dd/yyyy) as indicated and click Submit. You will be taken to the next sign-up page. 5. Create a Vendormatet ID. This will be your MYFX login ID and cannot be changed, so think of one that is secure and easy to remember. 6. Create a [employer] password. You can change your password at any time. 7. Enter your Password Reset Question and Answer. This can be used at a later time if you forget your password. 8. Enter your e-mail address. You will receive e-mail notification when new information is available in [address]. 9. Click Sign Up. You can now view and download portions of your medical record. 10. Click the Download Summary menu link to download a portable copy of your medical information. Additional Information If you have questions, please visit the Frequently Asked Questions section of the MYFX website at [email or web address] Remember, MYFX is NOT to be used for urgent needs. For medical emergencies, dial 911. Diet- Cardiac, Ensure Enlive po three times daily with meals Activity - as tolerated FALL PRECAUTIONS 
ASPIRATION PRECAUTIONS DECUBITUS PRECAUTIONS Wound care as per vascular Incentive Spirometry Q30 minutes when awake PT, OT Evaluate and Treat Check CBC, CMP, Mg in 3 days Check PT, INR in 3 days F/u with PCP in 1 week F/u with vascular in 10 days F/u with Cardiologist in 2 weeks Discharge Orders None General Information Please provide this summary of care documentation to your next provider. Patient Signature:  ____________________________________________________________ Date:  ____________________________________________________________  
  
Tammie Piety Provider Signature:  ____________________________________________________________ Date:  ____________________________________________________________

## 2017-04-21 NOTE — DISCHARGE INSTRUCTIONS
Learning About Aortobifemoral Bypass Surgery for Peripheral Arterial Disease  What is an aortobifemoral bypass? An aortobifemoral (say \"ox-KT-ytc-by-FEM-uh-nani\") bypass is surgery to redirect blood flow around blocked blood vessels in your belly or pelvis. These blocked blood vessels are caused by peripheral arterial disease. The surgery is done to get more blood flow to the legs. This may allow you to walk farther. Your doctor will use a man-made blood vessel, called a graft, to bypass the blocked blood vessels. The graft will carry blood from the aorta to the femoral artery in the groin area of each thigh. The aorta is the large blood vessel that carries blood from the heart to the blood vessels in the belly. The femoral arteries are large blood vessels that carry blood from the blood vessels in the belly and pelvis to the legs. How is the surgery done? You will be asleep during the surgery. The doctor will make cuts (incisions) in your belly and in the groin area of each thigh. The doctor will connect the graft to the aorta through the cut in the belly. He or she will then tunnel the graft down to the cuts in your groin. This connects the bypass to your femoral arteries. When the graft is in place, the doctor will close the cuts in your skin with stitches or staples. What can you expect after this surgery? You will probably spend 4 to 7 days in the hospital. Your belly and groin will be sore for several weeks. You will probably feel more tired than usual for several weeks. You may be able to do many of your usual activities after 4 to 6 weeks. But you will likely need 2 to 3 months to fully recover. You will probably need to take at least 4 to 6 weeks off from work. It depends on the type of work you do and how you feel. Follow-up care is a key part of your treatment and safety. Be sure to make and go to all appointments, and call your doctor if you are having problems.  It's also a good idea to know your test results and keep a list of the medicines you take. Where can you learn more? Go to http://yuniel-lyndsay.info/. Enter 24 [id number] in the search box to learn more about \"Learning About Aortobifemoral Bypass Surgery for Peripheral Arterial Disease. \"  Current as of: January 27, 2016  Content Version: 11.2  © 7429-9805 [employer]. Care instructions adapted under license by [employer] (which disclaims liability or warranty for this information). If you have questions about a medical condition or this instruction, always ask your healthcare professional. [place] [address] any warranty or liability for your use of this information. Helping Your Child Take Blood Thinners Safely: Care Instructions  Your Care Instructions  Blood thinners are medicines that help prevent blood clots and keep them from growing bigger. They don't actually thin the blood. They slow down the time it takes for a blood clot to form. Blood thinners also keep existing blood clots from getting bigger. Your doctor may call these medicines anticoagulants. Your child may take this medicine as a pill. Or the blood thinner may be given as a shot. Blood thinners can make a child more likely to bleed. But they can also save lives. With care, you can help prevent bleeding and keep your child safe while letting him or her play and be active. Follow-up care is a key part of your child's treatment and safety. Be sure to make and go to all appointments, and call your doctor if your child is having problems. It's also a good idea to know your child's test results and keep a list of the medicines your child takes. How can you care for your child at home? · Be safe with medicines. Have your child take medicines exactly as prescribed. Call your doctor if you think your child is having a problem with his or her medicine.   · If your child misses a dose of medicine, don't give an extra dose to make up for it. Your doctor can tell you what to do so you don't give too much or too little. · Talk to the doctor before you start or stop giving your child any medicines, vitamins, or natural remedies. Medicines may affect how blood thinners work. · Ask your pharmacist how to store the blood thinner. · Have your child wear medical alert jewelry. This lets others know that your child takes a blood thinner. You can buy it at most drugstores. · Tell your child's doctors, dentist, and pharmacist that your child is taking a blood thinner. Also tell the people who care for your child, such as relatives, babysitters, and the school nurse. Let them know what to do if your child has a cut or bruise and when to call for help. · If your child takes heparin, learn how to give a shot. Ask your doctor for instructions. · If your child takes warfarin:  ¨ Keep the amount of vitamin K in your child's diet about the same from day to day. Vitamin K affects how warfarin works and how the blood clots. It is in many foods, such as leafy greens, green vegetables, and vegetable oils. ¨ Take your child to the doctor for scheduled blood tests. These tests check how long it takes the blood to form a clot. The doctor may adjust your child's dose of warfarin based on the results. When should you call for help? Call 911 anytime you think your child may need emergency care. For example, call if:  · Your child has a sudden, severe headache that is different from past headaches. Call your doctor now or seek immediate medical care if:  · Your child has any abnormal bleeding, such as:  ¨ Nosebleeds. ¨ Vaginal bleeding that is different (heavier, more frequent, at a different time of the month) than what your child is used to. ¨ Bloody or black stools, or rectal bleeding. ¨ Bloody or pink urine. · Your child has an injury from a fall.   Watch closely for changes in your child's health, and be sure to contact your doctor if your child has any problems. Where can you learn more? Go to http://yuniel-lyndsay.info/. Enter R500 in the search box to learn more about \"Helping Your Child Take Blood Thinners Safely: Care Instructions. \"  Current as of: October 11, 2016  Content Version: 11.2  © 3646-6964 [employer]. Care instructions adapted under license by [employer] (which disclaims liability or warranty for this information). If you have questions about a medical condition or this instruction, always ask your healthcare professional. [address] any warranty or liability for your use of this information. Call if fever>101.4F, red/pus at incisions, pain>meds.

## 2017-04-21 NOTE — PROGRESS NOTES
ARU/IPR REFERRAL CONTACT NOTE  [id number] [place] [address] for Physical Rehabilitation          RE:  Richelle Mak     Thank you for the opportunity to review this patient's case for admission to [id number] [place] [address] for Physical Rehabilitation. Based on our pre-admission screening patient meets criteria for admission to [facility] for Physical Rehabilitation. Plan for admission today to room __179____ at ____3pm___. Will need d/c summary/med rec completed and report called to [phone] prior to patient's arrival.    Again, Thank you for this referral. Should you have any questions please do not hesitate to call. Sincerely,  Kartik Chambers.  [facility] for [facility]  [phone]

## 2017-04-21 NOTE — PROGRESS NOTES
Four eye assessment completed by Elisabeth Lentz LPN and Gomez Stratton RN documented in doc flow sheet

## 2017-04-21 NOTE — ROUTINE PROCESS
Bedside and Verbal shift change report given to Constantino (oncoming nurse) by Shane Roach (offgoing nurse). Report included the following information SBAR, Kardex, MAR and Recent Results.

## 2017-04-21 NOTE — PROGRESS NOTES
Problem: Self Care Deficits Care Plan (Adult)  Goal: *Acute Goals and Plan of Care (Insert Text)  Occupational Therapy Goals  Initiated 4/13/2017 within 7 day(s). Re-eval on 4/20/17 changed goals    1. Patient will perform grooming with supervision/set-up standing at sink  2. Patient will perform lower body bathing with supervision/set-up. 3. Patient will perform lower body dressing with supervision/set-up. 4. Patient will perform toilet transfers with supervision/set-up. 5. Patient will perform all aspects of toileting with supervision/set-up. Outcome: Progressing Towards Goal  OCCUPATIONAL THERAPY TREATMENT     Patient: Pamela Castillo (84 y.o. male)  Date: 4/21/2017  Diagnosis: Atheroscler of native artery of both legs with intermit claudication ([facility] [address] [I70.213] <principal problem not specified>  Procedure(s) (LRB):  FEMORAL-POPLITEAL BYPASS/WITH GRAFT RIGHT LEG (Right) 11 Days Post-Op  Precautions:  (WBAT RUE)  Chart, occupational therapy assessment, plan of care, and goals were reviewed. ASSESSMENT:  Pt is very motivated to participate in OT. Pt is present sitting up in the chair with supportive daughter present in room. Pt performed functional mobility to the BR w/FWW requiring initially CGA for sit->std, and performed toilet txfr w/CGA for safety and min VCs for pacing. Following toileting pt performed ADL grooming task std sinkside w/Supervised A and good safety awareness. Pt then participated in LB dressing, requiring Supervised A for seated and SBA for std aspects 2/2 pt's decreased dynamic std balance. Pt left in the chair upon completion of the session, w/call bell within reach.   Progression toward goals:  [X]          Improving appropriately and progressing toward goals  [ ]          Improving slowly and progressing toward goals  [ ]          Not making progress toward goals and plan of care will be adjusted       PLAN:  Patient continues to benefit from skilled intervention to address the above impairments. Continue treatment per established plan of care. Discharge Recommendations:  Rehab  Further Equipment Recommendations for Discharge:  N/A      G-CODES:      Self Care  Current  CI= 1-19%. The severity rating is based on the Level of Assistance required for Functional Mobility and ADLs. SUBJECTIVE:   Patient stated Sujatha Ferguson will do exactly as you tell me. I will not get up unless someone is with me.       OBJECTIVE DATA SUMMARY:   Cognitive/Behavioral Status:  Neurologic State: Alert  Orientation Level: Oriented X4  Cognition: Follows commands  Safety/Judgement: Awareness of environment, Fall prevention     Functional Mobility and Transfers for ADLs:              Bed Mobility:   Scooting: Supervision              Transfers:  Sit to Stand: Contact guard assistance;Stand-by asssistance              Toilet Transfer : Contact guard assistance (w/FWW)               Balance:  Sitting: Intact  Standing: Impaired; With support  Standing - Static: Good  Standing - Dynamic : Fair     ADL Intervention:   Grooming  Grooming Assistance: Supervision/set up (std sinkside)  Washing Hands: Supervision/set-up     Lower Body Bathing  Perineal  : Stand-by assistance (simulated)  Lower Body : Supervision/set-up     Lower Body Dressing Assistance  Underpants: Stand-by assistance  Pants With Button/Zipper: Stand-by assistance  Socks: Supervision/set-up  Pain:  Pt reports 0/10 pain or discomfort prior to treatment. Pt reports 2/10 pain or discomfort post treatment. Activity Tolerance:    Fair     Please refer to the flowsheet for vital signs taken during this treatment.   After treatment:   [X]  Patient left in no apparent distress sitting up in chair  [ ]  Patient left in no apparent distress in bed  [X]  Call bell left within reach  [X]  Nursing notified  [ ]  Caregiver present  [ ]  Bed alarm activated     LEA Cheema  Time Calculation: 28 mins

## 2017-04-21 NOTE — PROGRESS NOTES
SHIFT CHANGE NOTE FOR CONSTANCE    Bedside and Verbal shift change report given to [address] (oncoming nurse) by Javy Olmstead LPN   (offgoing nurse). Report included the following information SBAR, Kardex, MAR and Recent Results.     Situation:   Code Status: Full Code   Reason for Admission: [address] Day: 0   Problem List:   Hospital Problems  Date Reviewed: 4/4/2017          Codes Class Noted POA    Anticoagulated on Coumadin (Chronic) ICD-10-CM: Z51.81, Z79.01  ICD-9-CM: V58.83, V58.61  Unknown Yes        Hyperuricemia (Chronic) ICD-10-CM: E79.0  ICD-9-CM: 790.6  Unknown Yes        Aftercare following surgery of the circulatory system ICD-10-CM: L98.549  ICD-9-CM: V58.73  4/21/2017 Yes    Overview Signed 4/21/2017  2:32 PM by Bobo Garcia MD     S/P Right axillary to bifemoral artery bypass using an 8 mm Propaten graft from the right axilla to the right common femoral artery with a jump femoral-femoral bypass with another 8 mm Propaten external ring bypass; Right common femoral artery, and profunda femoris artery and superficial femoral artery endarterectomy causing an extensive surgery on the right side (4/4/2017 - Dr. Helder Martin)    S/P Cutdown of femoral-femoral bypass, more on the left groin side; Right lower extremity angiography with first-order catheterization (4/7/2017 - Dr. Helder Martin)    S/P Right femoral to above-knee popliteal bypass with an 8 mm PTFE graft (4/10/2017 - Dr. Sidney Strauss)             * (Principal)Status post femoral-popliteal bypass surgery ICD-10-CM: Z95.828  ICD-9-CM: V45.89  4/21/2017 Yes    Overview Signed 4/21/2017  2:33 PM by Bobo Garcia MD     S/P Right axillary to bifemoral artery bypass using an 8 mm Propaten graft from the right axilla to the right common femoral artery with a jump femoral-femoral bypass with another 8 mm Propaten external ring bypass; Right common femoral artery, and profunda femoris artery and superficial femoral artery endarterectomy causing an extensive surgery on the right side (4/4/2017 - Dr. Anu Mosley)    S/P Cutdown of femoral-femoral bypass, more on the left groin side; Right lower extremity angiography with first-order catheterization (4/7/2017 - Dr. Anu Mosley)    S/P Right femoral to above-knee popliteal bypass with an 8 mm PTFE graft (4/10/2017 - Dr. Licha Benites)             History of cardioversion ICD-10-CM: Z98.890  ICD-9-CM: V15.1  4/18/2017 Yes    Overview Signed 4/21/2017  2:34 PM by Rajani Sloan MD     S/P Synchronized external cardioversion (4/18/2017 - Dr. Katya Vo)             Chronic atrial fibrillation ([facility] (Chronic) ICD-10-CM: J99.6  ICD-9-CM: 427.31  Unknown Yes        Critical ischemia of lower extremity ICD-10-CM: I99.8  ICD-9-CM: 459.9  4/10/2017 No    Overview Signed 4/21/2017  4:41 PM by Rajani Sloan MD     Right lower limb             Elevated TSH ICD-10-CM: R94.6  ICD-9-CM: 794.5  4/6/2017 Yes        Acute blood loss as cause of postoperative anemia ICD-10-CM: D62  ICD-9-CM: 285.1  4/4/2017 Yes        Impaired mobility and ADLs ICD-10-CM: Z74.09  ICD-9-CM: 799.89  4/4/2017 Yes        Chronic ulcer of right foot (HCC) (Chronic) ICD-10-CM: L97.519  ICD-9-CM: 707.15  4/4/2017 Yes        [facility] oc[facility] (Chronic) ICD-10-CM: A31.38  ICD-9-CM: 444.09  1/25/2017 Yes        Atherosclerosis of native artery of both lower extremities with intermittent claudication (HCC) (Chronic) ICD-10-CM: B81.246  ICD-9-CM: 440.21  1/25/2017 Yes        Peripheral artery disease (HCC) (Chronic) ICD-10-CM: I73.9  ICD-9-CM: 443.9  1/25/2017 Yes        Benign hypertensive heart disease with systolic congestive heart failure, NYHA class 2 (HCC) (Chronic) ICD-10-CM: I11.0, I50.20  ICD-9-CM: 402.11, 428.20, 428.0  1/26/2015 Yes              Background:   Past Medical History:   Past Medical History:   Diagnosis Date    Acute blood loss as cause of postoperative anemia 4/4/2017    Anticoagulated on Coumadin     Aortoiliac occlusive disease ([facility] [address] 1/25/2017    Atherosclerosis of native artery of both lower extremities with intermittent claudication ([facility] [address] 1/25/2017    Benign hypertensive heart disease with systolic congestive heart failure, NYHA class 2 ([facility] [address] 1/26/2015    Carotid artery disease (HCC)     Chronic atrial fibrillation (HCC)     Chronic systolic heart failure (HCC)     Chronic ulcer of right foot ([facility] [address] 4/4/2017    Coronary artery disease involving native coronary artery of native heart 3/15/2017    Successful stenting of Cx (Xience LESLEY) and RCA (Xience LESLEY) to 0% by Dr. Sol Smyth on 3/15/17.     DDD (degenerative disc disease), lumbar 1/26/2015    Dyslipidemia     Elevated TSH 4/6/2017    Erectile dysfunction 7/5/2016    Hereditary peripheral neuropathy 11/15/2016    History of cardioversion 4/18/2017    S/P Synchronized external cardioversion (4/18/2017 - Dr. Martha Lara)    Hyperuricemia     Ischemic cardiomyopathy     Per[facility] 1/25/2017    Spinal stenosis of lumbar region with radiculopathy 5/4/2015    Dr. Codie De Jesus      Patient taking anticoagulants YES   Patient has a defibrillator: no     Assessment:   Changes in Assessment throughout shift: NONE                     Last Vitals:     Vitals:    04/21/17 1800   BP: 127/67   Pulse: 100   Resp: 20   Temp: 98.5 °F (36.9 °C)   SpO2: 100%        PAIN    Pain Assessment    Pain Intensity 1: 6 (04/21/17 1821) Pain Intensity 1: 2 (12/29/14 1105)    Pain Location 1: Foot Pain Location 1: Abdomen    Pain Intervention(s) 1: Medication (see MAR) Pain Intervention(s) 1: Medication (see MAR)  Patient Stated Pain Goal: 0 Patient Stated Pain Goal: 0  o Intervention effective: YES   o Other actions taken for pain: MEDICATION     Skin Assessment  Skin color Skin Color: Appropriate for ethnicity  Condition/Temperature Skin Condition/Temp: Warm  Integrity Skin Integrity: Incision (comment)  Turgor Turgor: Non-tenting  Weekly Pressure Ulcer Documentation Weekly Pressure Ulcer Documentation: No Pressure Ulcer Noted-Pressure Ulcer Prevention Initiated  Wound Prevention & Protection Methods  Orientation of wound Orientation of Wound Prevention: Posterior  Location of Prevention Location of Wound Prevention: Buttocks, Heel, Sacrum/Coccyx  Dressing Present Dressing Present : Yes  Dressing Status Dressing Status: Intact  Wound Offloading Wound Offloading (Prevention Methods): Bed, pressure redistribution/air, Elevate heels, Heel boots     INTAKE/OUPUT    Date 04/20/17 0700 - 04/21/17 0659(Not Admitted) 04/21/17 0700 - 04/22/17 0659   Shift [id number] [id number] 24 Hour Total 0700-1859 1900-0659 24 Hour Total   I  N  T  A  K  E   Shift Total         O  U  T  P  U  T   Urine            Urine Occurrence(s)    0 x  0 x    Stool            Stool Occurrence(s)    0 x  0 x    Shift Total         NET         Weight (kg)             Recommendations:  1. Patient needs and requests: TOILETING    2. Diet: CARDIAC REG    3. Pending tests/procedures: LABS    4. Functional Level/Equipment: wheelchair    5. Estimated Discharge Date: TBD Posted on Whiteboard in Patients Room: no     HEALS Safety Check    A safety check occurred in the patient's room between off going nurse and oncoming nurse listed above.     The safety check included the below items  Area Items   H  High Alert Medications - Verify all high alert medication drips (heparin, PCA, etc.)   E  Equipment - Suction is set up for ALL patients (with yanker)  - Red plugs utilized for all equipment (IV pumps, etc.)  - WOWs wiped down at end of shift.  - Room stocked with oxygen, suction, and other unit-specific supplies   A  Alarms - Bed alarm is set for fall risk patients  - Ensure chair alarm is in place and activated if patient is up in a chair   L  Lines - Check IV for any infiltration  - Jiang bag is empty if patient has a Jiang   - Tubing and IV bags are labeled   S  Safety   - Room is clean, patient is clean, and equipment is clean. - Hallways are clear from equipment besides carts. - Fall bracelet on for fall risk patients  - Ensure room is clear and free of clutter  - Suction is set up for ALL patients (with cailin)  - Hallways are clear from equipment besides carts.    - Isolation precautions followed, supplies available outside room, sign posted

## 2017-04-21 NOTE — PROGRESS NOTES
Patient discharged from [facility] to [facility]. Nurse Navigator will continue to monitor admission status through the electronic medical record.  ISRAEL will begin upon discharge

## 2017-04-21 NOTE — IP AVS SNAPSHOT
Current Discharge Medication List  
  
START taking these medications Dose & Instructions Dispensing Information Comments Morning Noon Evening Bedtime  
 ascorbic acid (vitamin C) 250 mg tablet Commonly known as:  VITAMIN C Your last dose was: Your next dose is:    
   
   
 Dose:  250 mg Take 1 Tab by mouth daily (with breakfast). Quantity:  30 Tab Refills:  0  
     
   
   
   
  
 bisacodyl 5 mg EC tablet Commonly known as:  DULCOLAX Your last dose was: Your next dose is:    
   
   
 Dose:  10 mg Take 2 Tabs by mouth every forty-eight (48) hours as needed for Constipation. Indications: Constipation Quantity:  14 Tab Refills:  0  
     
   
   
   
  
 cholecalciferol 1,000 unit tablet Commonly known as:  VITAMIN D3 Your last dose was: Your next dose is:    
   
   
 Dose:  1000 Units Take 1 Tab by mouth daily. Quantity:  30 Tab Refills:  0  
     
   
   
   
  
 dilTIAZem 30 mg tablet Commonly known as:  CARDIZEM Replaces:  dilTIAZem  mg ER capsule Your last dose was: Your next dose is:    
   
   
 Dose:  30 mg Take 1 Tab by mouth Before breakfast, lunch, dinner and at bedtime. Indications: hypertension Quantity:  120 Tab Refills:  0  
     
   
   
   
  
 docusate sodium 100 mg capsule Commonly known as:  Grey Guppy Your last dose was: Your next dose is:    
   
   
 Dose:  100 mg Take 1 Cap by mouth two (2) times a day for 90 days. Indications: Constipation Quantity:  60 Cap Refills:  0  
     
   
   
   
  
 ferrous sulfate 325 mg (65 mg iron) tablet Your last dose was: Your next dose is:    
   
   
 Dose:  325 mg Take 1 Tab by mouth daily (with breakfast). Quantity:  30 Tab Refills:  0  
     
   
   
   
  
 gabapentin 100 mg capsule Commonly known as:  NEURONTIN Your last dose was: Your next dose is: Dose:  100 mg Take 1 Cap by mouth two (2) times a day. Indications: NEUROPATHIC PAIN Quantity:  60 Cap Refills:  0  
     
   
   
   
  
 * OTHER Your last dose was: Your next dose is:    
   
   
 Check PT, INR on 5/1/17- results to Cardiologist immediately. Diagnosis- A Fib Quantity:  1 Each Refills:  0  
     
   
   
   
  
 * OTHER Your last dose was: Your next dose is:    
   
   
 Check CBC, CMP, Mg on 5/1/17. Results to PCP immediately. Diagnosis- PAD Quantity:  1 Each Refills:  0  
     
   
   
   
  
 * OTHER Your last dose was: Your next dose is:    
   
   
 Incentive Spirometry- Use as directed Quantity:  1 Each Refills:  0  
     
   
   
   
  
 zinc sulfate 220 (50) mg capsule Commonly known as:  ZINCATE Your last dose was: Your next dose is:    
   
   
 Dose:  220 mg Take 1 Cap by mouth daily. Quantity:  30 Cap Refills:  0  
     
   
   
   
  
 * Notice: This list has 3 medication(s) that are the same as other medications prescribed for you. Read the directions carefully, and ask your doctor or other care provider to review them with you. CONTINUE these medications which have CHANGED Dose & Instructions Dispensing Information Comments Morning Noon Evening Bedtime * amiodarone 400 mg tablet Commonly known as:  Knute Share What changed:  additional instructions Your last dose was: Your next dose is:    
   
   
 Dose:  400 mg Take 1 Tab by mouth two (2) times a day. LAST DOSE in the evening of 5/2/17. From 5/3/17 start 200 mg PO BID ( given as a separate prescription ) Quantity:  7 Tab Refills:  0  
     
   
   
   
  
 * amiodarone 200 mg tablet Commonly known as:  CORDARONE Start taking on:  5/3/2017 What changed: You were already taking a medication with the same name, and this prescription was added. Make sure you understand how and when to take each. Your last dose was: Your next dose is:    
   
   
 Dose:  200 mg Take 1 Tab by mouth two (2) times a day. 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  
     
   
   
   
  
 aspirin 81 mg chewable tablet What changed:  when to take this Your last dose was: Your next dose is:    
   
   
 Dose:  81 mg Take 1 Tab by mouth daily (with breakfast). Quantity:  30 Tab Refills:  0  
     
   
   
   
  
 atorvastatin 40 mg tablet Commonly known as:  LIPITOR What changed:  when to take this Your last dose was: Your next dose is:    
   
   
 Dose:  40 mg Take 1 Tab by mouth nightly. Indications: DYSLIPIDEMIA Quantity:  30 Tab Refills:  0  
     
   
   
   
  
 clopidogrel 75 mg Tab Commonly known as:  PLAVIX What changed:  when to take this Your last dose was: Your next dose is:    
   
   
 Dose:  75 mg Take 1 Tab by mouth daily (with dinner). Quantity:  30 Tab Refills:  0  
     
   
   
   
  
 losartan 25 mg tablet Commonly known as:  COZAAR What changed:   
- medication strength 
- how much to take Your last dose was: Your next dose is:    
   
   
 Dose:  25 mg Take 1 Tab by mouth daily. Indications: Chronic Heart Failure, hypertension Quantity:  30 Tab Refills:  0  
     
   
   
   
  
 metoprolol tartrate 25 mg tablet Commonly known as:  LOPRESSOR What changed:  how much to take Your last dose was: Your next dose is:    
   
   
 Dose:  12.5 mg Take 0.5 Tabs by mouth every twelve (12) hours. Indications: hypertension, VENTRICULAR RATE CONTROL IN ATRIAL FIBRILLATION Quantity:  30 Tab Refills:  0  
     
   
   
   
  
 oxyCODONE-acetaminophen  mg per tablet Commonly known as:  PERCOCET 10 What changed:  reasons to take this Your last dose was: Your next dose is: Dose:  1 Tab Take 1 Tab by mouth every four (4) hours as needed (for pain level greater than 4/10). Max Daily Amount: 6 Tabs. Indications: Pain Quantity:  20 Tab Refills:  0  
     
   
   
   
  
 warfarin 2.5 mg tablet Commonly known as:  COUMADIN What changed:   
- how much to take 
- how to take this - when to take this 
- additional instructions Your last dose was: Your next dose is:    
   
   
 2.5 mg po daily through 5/1/17 and then as per [facility] Quantity:  30 Tab Refills:  0  
     
   
   
   
  
 * Notice: This list has 2 medication(s) that are the same as other medications prescribed for you. Read the directions carefully, and ask your doctor or other care provider to review them with you. CONTINUE these medications which have NOT CHANGED Dose & Instructions Dispensing Information Comments Morning Noon Evening Bedtime  
 cyanocobalamin 1,000 mcg tablet Commonly known as:  VITAMIN B-12 Your last dose was: Your next dose is:    
   
   
 Dose:  1000 mcg Take 1 Tab by mouth daily. Quantity:  90 Tab Refills:  3 DULoxetine 60 mg capsule Commonly known as:  CYMBALTA Your last dose was: Your next dose is:    
   
   
 Dose:  60 mg Take 1 Cap by mouth daily. Quantity:  30 Cap Refills:  0 STOP taking these medications   
 amLODIPine 10 mg tablet Commonly known as:  NORVASC  
   
  
 avanafil 100 mg tablet Commonly known as:  STENDRA  
   
  
 dilTIAZem  mg ER capsule Commonly known as:  CARDIZEM CD Replaced by:  dilTIAZem 30 mg tablet  
   
  
 hydroCHLOROthiazide 25 mg tablet Commonly known as:  HYDRODIURIL  
   
  
 POTASSIUM PHOSPHATE PO Where to Get Your Medications Information on where to get these meds will be given to you by the nurse or doctor. ! Ask your nurse or doctor about these medications amiodarone 200 mg tablet  
 amiodarone 400 mg tablet  
 ascorbic acid (vitamin C) 250 mg tablet  
 aspirin 81 mg chewable tablet  
 atorvastatin 40 mg tablet  
 bisacodyl 5 mg EC tablet  
 cholecalciferol 1,000 unit tablet  
 clopidogrel 75 mg Tab  
 dilTIAZem 30 mg tablet  
 docusate sodium 100 mg capsule DULoxetine 60 mg capsule  
 ferrous sulfate 325 mg (65 mg iron) tablet  
 gabapentin 100 mg capsule  
 losartan 25 mg tablet  
 metoprolol tartrate 25 mg tablet OTHER  
 OTHER  
 OTHER  
 oxyCODONE-acetaminophen  mg per tablet  
 warfarin 2.5 mg tablet  
 zinc sulfate 220 (50) mg capsule

## 2017-04-21 NOTE — H&P
[address]  [address] [place] [address] [address]     INPATIENT REHABILITATION  HISTORY AND PHYSICAL    Name: Pamela Castillo CSN: [id number]   Age: 59 y.o. MRN: [id number]   Sex: male Admit Date: 4/21/2017     PCP: Dr. Quirino Halsted      Primary Rehab Impairment Category (MARTELL): Miscellaneous    Impairment Group Label: Debility    Etiologic Diagnosis: Critical ischemia of the right lower limb secondary to peripheral arterial disease      Subjective:     Patient seen and examined. History of the Present Illness: The patient is a 75-year-old White male with multiple medical comorbidities who was admitted to [facility] on 4/4/2017 for elective revascularization surgery of the lower extremities due to rest pain. The patient was being managed by Physical Medicine and Rehabilitation (Dr. Lotus Briseno) for lower back pain with numbness of both lower extremities attributed to degenerative disc disease of the lumbar spine / lumbar spinal stenosis with radiculopathy. MRI of the lumbar spine done 11/25/2017 showed significant aortic stenosis which is progressed since prior imaging; there is possible occlusion of the left common iliac artery which may been present previously. On the patient's last visit with Dr. Wali Meza on 12/13/2016, it was felt that the patient symptoms were multifactorial and not totally attributable to the degenerative disc disease of the lumbar spine / lumbar spinal stenosis with radiculopathy. It was felt that a vascular component is contributing to the symptoms. The patient was referred to Vascular Surgery (Dr. Audrey Zaidi).  CT angiogram of the abdomen with arterial runoff done 1/24/2017 showed significant distal aortic stenosis with an infrarenal abdominal aorta and extensive atherosclerotic soft plaque/mural thrombus with a minimal luminal diameter of approximately 8 mm; significant arterial inflow disease of the left leg with occlusion of the left common iliac artery at its origin with the left external iliac artery also remaining largely occluded; the left common femoral artery/distal left external iliac artery reconstitutes via collaterals; there is extensive multifocal atherosclerotic irregularity and stenoses in the left SFA, popliteal artery, and  calf arteries; there is at least a two-vessel runoff to the left foot; the peroneal artery at the level of the ankle is nonopacified, which could be due to occlusion or contrast bolus timing; significant arterial inflow disease of the right leg with high-grade stenosis near the origin of the right common iliac artery with additional mild stenosis in its midportion; the right external iliac artery is patent with moderate stenosis just above the inguinal ligament; additional right lower extremity arterial stenoses as described above with a three-vessel runoff to the right foot. Duplex ultrasound of the arteries of the lower extremities done 1/24/2017 showed significant stenosis in the external illiac artery bilaterally; moderate calcified plaque noted from common femoral artery to popliteal artery bilaterally with continous flow; both calves vessels are patent with continous flow with moderate atherosclerotic plaque; the level of disease appeared to multilevel in both legs starting at the aorto illiac segments; the right ankle/brachial index is 0.36; the left ankle/brachial index is 0.43. Duplex carotid artery ultrasound done 2/27/2017 showed probable occlusion of the right internal carotid artery, cannot exclude extremely low flow; mild plaquing of the left internal carotid artery in the less than 50% range; no significant stenosis in the external carotid arteries bilaterally. Surgery was recommended by Dr. Danielle Henry and in preparation for the surgery, the patient was referred to Cardiology (Dr. Delmer Segovia).  The patient underwent cardiac catheterization on 3/8/2017 and stenting of the circumflex artery and the RCA on 3/15/2017 done by Dr. Tobin Lama. The patient was admitted to [facility] on 4/4/2017 under the service of Vascular Surgery (Dr. Polly Irwin). The patient underwent a Right axillary to bifemoral artery bypass using an 8 mm Propaten graft from the right axilla to the right common femoral artery with a jump femoral-femoral bypass with another 8 mm Propaten external ring bypass; Right common femoral artery, and profunda femoris artery and superficial femoral artery endarterectomy causing an extensive surgery on the right side on 4/4/2017 done by Dr. Polly Irwin. The patient tolerated the procedure well with no intraoperative complications. Cardiology consult (Dr. Cary Bass) was called for evaluation and comanagement of atrial fibrillation. Nephrology consult (Dr. Damien Castellano) was called for evaluation and comanagement of acute renal failure/acute kidney injury. The patient was brought back to the operating room and underwent a Cutdown of femoral-femoral bypass, more on the left groin side; Right lower extremity angiography with first-order catheterization on 4/7/2017 done by Dr. Polly Irwin. Despite the surgery, the patient still had continued rest pain in the right leg/foot. The patient was brouhgt back to the operating room and underwent a Right femoral to above-knee popliteal bypass with an 8 mm PTFE graft on 4/10/2017 done by Dr. Jolanta Pulido. The patient tolerated the procedure well with no intraoperative complications. After undergoing the three surgical procedures during the hospital admission, the patient developed acute postoperative blood loss anemia but no blood transfusion was given. Pain was controlled with oral narcotics. Enoxaparin was started for cerebral thromboembolism/DVT prophylaxis. Coumadin was overlapped.  Podiatry consult (Dr. Georgia Gongora) was called for evaluation and comanagement of the right leg/foot ulcers. No surgical intervention was recommended for the ulcers. Orders for dressing were given. Patient was instructed to use a Multipodus boot on the RLE. On 4/17/2017, the patient had an episode of altered mental status so the [facility] (Dr. Riky Alvarenga) was consulted for evaluation and comanagement. Mental status had improved after holding narcotics. Due to the atrial fibrillation, the patient was placed on a Diltiazem drip and Metoprolol tartrate. The patient underwent a Synchronized external cardioversion on 4/18/2017 done by Dr. Anand Massey. The patient tolerated the procedure well with no intraoperative complications. Diltiazem drip was transitioned to Diltiazem CD. Metoprolol tartrate dose was increased. Amiodarone was added. Enoxaparin was discontinued when INR was 2.0 on 4/19/2017. Cardiology recommended to continued DAPT (ASA and Clopidogrel) for PAD. Target goal for INR was set at 2.5 to 3.5. The patient had remained hemodynamically stable but due to the above events, the patient was noted to be generally weak and with impaired mobility and ADLs. Patient was felt to be a good candidate for acute inpatient rehabilitation. Upon evaluation by Physical Therapy and Occupational Therapy, the patient was recommended for acute inpatient rehabilitation. The patient was discharged and was subsequently admitted to the [facility] for intensive rehabilitation to help recover strength, function and mobility.       Past Medical History:  Past Medical History:   Diagnosis Date    Acute blood loss as cause of postoperative anemia 4/4/2017    Anticoagulated on Coumadin     Aortoiliac occlusive disease ([address] 1/25/2017    Atherosclerosis of native artery of both lower extremities with intermittent claudication ([address] 1/25/2017    Benign hypertensive heart disease with systolic congestive heart failure, NYHA class 2 ([address] 1/26/2015  Carotid artery disease (HCC)     Chronic atrial fibrillation (HCC)     Chronic systolic heart failure (HCC)     Chronic ulcer of right foot ([facility] 4/4/2017    Coronary artery disease involving native coronary artery of native heart 3/15/2017    Successful stenting of Cx (Xience LESLEY) and RCA (Xience LESLEY) to 0% by Dr. Christian Rivas on 3/15/17.     DDD (degenerative disc disease), lumbar 1/26/2015    Dyslipidemia     Elevated TSH 4/6/2017    Erectile dysfunction 7/5/2016    Hereditary peripheral neuropathy 11/15/2016    History of cardioversion 4/18/2017    S/P Synchronized external cardioversion (4/18/2017 - Dr. Cary Bass)    Hyperuricemia     Ischemic cardiomyopathy     Peripheral artery disease ([facility] 1/25/2017    Spinal stenosis of lumbar region with radiculopathy 5/4/2015    Dr. Desirae Bojorquez       Past Surgical History:  Past Surgical History:   Procedure Laterality Date    CARDIAC CATHETERIZATION  3/8/2017         CARDIAC CATHETERIZATION  3/15/2017         CORONARY STENT SINGLE W/PTCA  3/15/2017         HX COLONOSCOPY  07/23/2015    polyps repeat 2020 5 years Maria Elena Henderson    HX FEMORAL BYPASS Right 04/04/2017    S/P Right axillary to bifemoral artery bypass using an 8 mm Propaten graft from the right axilla to the right common femoral artery with a jump femoral-femoral bypass with another 8 mm Propaten external ring bypass; Right common femoral artery, and profunda femoris artery and superficial femoral artery endarterectomy causing an extensive surgery on the right side (4/4/2017 - Dr. Peggy Baarjas)    [address] Right 04/07/2017    S/P Cutdown of femoral-femoral bypass, more on the left groin side; Right lower extremity angiography with first-order catheterization (4/7/2017 - Dr. Polly Irwin)    HX FEMORAL BYPASS Right 04/10/2017    S/P Right femoral to above-knee popliteal bypass with an 8 mm PTFE graft (4/10/2017 - Dr. Jolanta Pulido)       Allergies:  No Known Allergies      Social History: The patient is legally , lives alone in a mobile home/trailer with a 3-step entry in [place], [place]. Patient is an ex-cigarette smoker who quit > 10 years ago. He is an ex-alcoholic beverage drinker who quit > 1 month ago. He last smoked marijuana ~6 months ago. He presently works as a . He has a daughter who lives in [place], [place]. Family History: Both parents are . Family History   Problem Relation Age of Onset    Heart Disease Father        Transfer Medications (from the transfer summary prepared by Dr. Jennifer Kent):    Prior to Admission Medications   Prescriptions Last Dose Informant Patient Reported? Taking? DULoxetine (CYMBALTA) 60 mg capsule   No No   Sig: Take 1 Cap by mouth daily. POTASSIUM PHOS,M-BASIC-D-BASIC (POTASSIUM PHOSPHATE PO)   Yes No   Sig: Take  by mouth. Take as directed   amLODIPine (NORVASC) 10 mg tablet   No No   Sig: Take 1 Tab by mouth daily. amiodarone (PACERONE) 400 mg tablet   No No   Sig: Take 1 Tab by mouth two (2) times a day. aspirin 81 mg chewable tablet   Yes No   Sig: Take 81 mg by mouth daily. atorvastatin (LIPITOR) 40 mg tablet   No No   Sig: Take 1 Tab by mouth daily. avanafil (STENDRA) 100 mg tablet   No No   Sig: Take 1 Tab by mouth as needed for Other. Take 1 tab 15-30 mins prior to sexual acitivity   clopidogrel (PLAVIX) 75 mg tab   No No   Sig: Take 1 Tab by mouth daily. cyanocobalamin (VITAMIN B-12) 1,000 mcg tablet   No No   Sig: Take 1 Tab by mouth daily. dilTIAZem CD (CARDIZEM CD) 120 mg ER capsule   No No   Sig: Take 1 Cap by mouth daily. hydrochlorothiazide (HYDRODIURIL) 25 mg tablet   No No   Sig: Take 1 Tab by mouth daily. losartan (COZAAR) 100 mg tablet   No No   Sig: Take 1 Tab by mouth daily. metoprolol tartrate (LOPRESSOR) 25 mg tablet   No No   Sig: Take 1 Tab by mouth every twelve (12) hours.    oxyCODONE-acetaminophen (PERCOCET 10)  mg per tablet No No   Sig: Take 1 Tab by mouth every four (4) hours as needed. Max Daily Amount: 6 Tabs. warfarin (COUMADIN) 2.5 mg tablet   No No   Sig: Take 1 Tab by mouth daily. Review Of Systems:   CONSTITUTIONAL: No weight loss. EYES: No blurred vision and no eye discharge. ENT: No nasal discharge. No ear pain. CARDIOVASCULAR: No chest pain and no diaphoresis. RESPIRATORY: No cough, no hemoptysis. GI: No vomiting, no diarrhea   : No urinary frequency and no dysuria. MUSCULOSKELETAL: Significant for acute postoperative musculoskeletal pain. SKIN: No rashes. NEURO: No dizziness, no numbness. ENDOCRINE: No polyphagia and no polydipsia. HEMATOLOGY: Significant for acute postoperative blood loss anemia. Objective:     Vital Signs:  Patient Vitals for the past 24 hrs:   BP Temp Pulse Resp SpO2   04/21/17 1800 127/67 98.5 °F (36.9 °C) 100 20 100 %        There is no height or weight on file to calculate BMI. Physical Examination:  GENERAL SURVEY: Patient is awake, alert, oriented x 3, sitting comfortably on the chair, not in acute respiratory distress. HEENT: pale palpebral conjunctivae, anicteric sclerae, no nasoaural discharge, moist oral mucosa  NECK: supple, no jugular venous distention, no palpable lymph nodes  CHEST/LUNGS: symmetrical chest expansion, good air entry, clear breath sounds  HEART: adynamic precordium, good S1 S2, no S3, regular rhythm, no murmurs  ABDOMEN: flat, bowel sounds appreciated, soft, non-tender  EXTREMITIES: (+) necrotic areas noted on plantar surface of right heel, lateral surface of right calf area and nailbase of right great toe, pale nailbeds, (+) Grade 1-2 edema on right leg, full and equal pulses, no calf tenderness   NEUROLOGICAL EXAM: The patient is awake, alert and oriented x3, able to answer questions fairly appropriately, able to follow 1 and 2 step commands. Able to tell time from the wall clock. Cranial nerves II-XII are grossly intact.   No gross sensory deficit. Motor strength is 4+/5 on BUE and the LLE, 4- to 4/5 on the RLE (except for 2-/5 on the right ankle and 1/5 on toes of right foot).     Post-op Incision(s): healing well, clean, dry, and intact      Current Medications:  Current Facility-Administered Medications   Medication Dose Route Frequency    acetaminophen (TYLENOL) tablet 650 mg  650 mg Oral Q4H PRN    docusate sodium (COLACE) capsule 100 mg  100 mg Oral BID    bisacodyl (DULCOLAX) tablet 10 mg  10 mg Oral Q48H PRN    amiodarone (CORDARONE) tablet 400 mg  400 mg Oral BID    [START ON 4/22/2017] dilTIAZem CD (CARDIZEM CD) capsule 120 mg  120 mg Oral DAILY    oxyCODONE-acetaminophen (PERCOCET 10)  mg per tablet 1 Tab  1 Tab Oral Q4H PRN    metoprolol tartrate (LOPRESSOR) tablet 25 mg  25 mg Oral Q12H    clopidogrel (PLAVIX) tablet 75 mg  75 mg Oral DAILY WITH DINNER    [START ON 4/22/2017] aspirin chewable tablet 81 mg  81 mg Oral DAILY WITH BREAKFAST    [START ON 4/22/2017] losartan (COZAAR) tablet 25 mg  25 mg Oral DAILY    [START ON 4/22/2017] DULoxetine (CYMBALTA) capsule 60 mg  60 mg Oral DAILY    Lactobacillus Acidoph & Bulgar (FLORANEX) tablet 2 Tab  2 Tab Oral BID    [START ON 5/3/2017] amiodarone (CORDARONE) tablet 200 mg  200 mg Oral BID    WARFARIN INFORMATION NOTE (COUMADIN)   Other Q24H    gabapentin (NEURONTIN) capsule 100 mg  100 mg Oral BID    [START ON 4/22/2017] zinc sulfate (ZINCATE) capsule 220 mg  1 Cap Oral DAILY    [START ON 4/22/2017] atorvastatin (LIPITOR) tablet 40 mg  40 mg Oral QHS       Functional Assessment:     Occupational Therapy   Prior Level of Function  Pre-Admission Screen    Eating   Independent Eating   Independent   Grooming   Independent Grooming   Minimal Assist   Upper Body Dressing   Independent Upper Body Dressing   Minimal Assist   Lower Body Dressing   Independent Lower Body Dressing   Minimal Assist   Bladder Management   Independent Bladder Management   Minimal Assist Bowel Management   Independent Bowel Management   Minimal Assist     Physical Therapy   Prior Level of Function  Pre-Admission Screen    Ambulation   Independent Ambulation   Minimal Assist   Bed Mobility   Independent Bed Mobility   Supervision   Supine to Sit   Independent Supine to Sit   Supervision   Sit to Stand   Independent Sit to Stand   Minimal Assist   Bed/Chair Transfers   Independent Bed/Chair Transfers   Minimal Assist   Toilet Transfers   Independent Toilet Transfers   Minimal Assist       Assessment:     Primary Rehabilitation Diagnosis  1. Impaired Mobility and ADLs  2. S/P Right axillary to bifemoral artery bypass using an 8 mm Propaten graft from the right axilla to the right common femoral artery with a jump femoral-femoral bypass with another 8 mm Propaten external ring bypass; Right common femoral artery, and profunda femoris artery and superficial femoral artery endarterectomy causing an extensive surgery on the right side (4/4/2017 - Dr. Nando Weaver); S/P Cutdown of femoral-femoral bypass, more on the left groin side; Right lower extremity angiography with first-order catheterization (4/7/2017 - Dr. Nando Weaver); S/P Right femoral to above-knee popliteal bypass with an 8 mm PTFE graft (4/10/2017 - Dr. Tiffnay Herrera)  3.  History of critical ischemia of the right lower limb secondary to peripheral arterial disease    Comorbidities   Benign hypertensive heart disease with systolic congestive heart failure, NYHA class 2     Vitamin B12 deficiency    DDD (degenerative disc disease), lumbar    Erectile dysfunction    Spinal stenosis of lumbar region with radiculopathy    Hereditary peripheral neuropathy    Aortoiliac occlusive disease     Atherosclerosis of native artery of both lower extremities with intermittent claudication     Peripheral artery disease     Coronary artery disease involving native coronary artery of native heart    Chronic atrial fibrillation     History of transient alteration of awareness, resolved    Acute blood loss as cause of postoperative anemia    Anticoagulated on Coumadin    Ischemic cardiomyopathy    Chronic systolic heart failure    Carotid artery disease     Dyslipidemia    Hyperuricemia    Elevated TSH    Aftercare following surgery of the circulatory system    History of cardioversion    Chronic ulcer of right foot        Willingness to participate in the program: Good      Rehabilitation Potential: Good      Plan:     1. Medical Issues being followed closely:    [x]  Fall and safety precautions     [x]  Wound Care     [x]  Bowel and Bladder Function     [x]  Fluid Electrolyte and Nutrition Balance     [x]  Pain Control      2. Issues that 24 hour rehabilitation nursing is following:    [x]  Fall and safety precautions     [x]  Wound Care     [x]  Bowel and Bladder Function     [x]  Fluid Electrolyte and Nutrition Balance     [x]  Pain Control      [x]  Assistance with and education on in-room safety with transfers to and from the bed, wheelchair, toilet and shower. 3. Acute rehabilitation plan of care:    [x]  Patient to be evaluated and treated by:           [x]  Physical Therapy           [x]  Occupational Therapy           []  Speech Therapy     []  Hold Rehab until further notice     5. Medications:    [x]  MAR Reviewed     [x]  Continue Present Medications     6. DVT Prophylaxis:      []  Lovenox     []  Unfractionated Heparin     [x]  Coumadin     []  NOAC     [x]  VASQUEZ Stockings     [x]  Sequential Compression Device     []  None     7. Rehabilitation program and expectations from patient, as well as medical issues discussed with the patient.       MEDICAL PLAN:  > S/P Right axillary to bifemoral artery bypass using an 8 mm Propaten graft from the right axilla to the right common femoral artery with a jump femoral-femoral bypass with another 8 mm Propaten external ring bypass; Right common femoral artery, and profunda femoris artery and superficial femoral artery endarterectomy causing an extensive surgery on the right side (4/4/2017 - Dr. Nando Weaver); S/P Cutdown of femoral-femoral bypass, more on the left groin side; Right lower extremity angiography with first-order catheterization (4/7/2017 - Dr. Nando Weaver); S/P Right femoral to above-knee popliteal bypass with an 8 mm PTFE graft (4/10/2017 - Dr. Tiffany Herrera);  History of critical ischemia of the right lower limb secondary to peripheral arterial disease   > Aspirin 81 mg PO once daily with breakfast   > Atorvastatin 40 mg PO q HS   > Clopidogrel 75 mg PO once daily with dinner    > Acute Postoperative Blood Loss Anemia   > Hgb/Hct (3/15/2017, prior to admission to [facility]) = 14.3/40.8   > Hgb/Hct (4/4/2017, post-op) = 9.8/27.1    > Hgb/Hct (4/19/2017) = 8.2/23.7   > CBC in AM   > Anemia work-up in AM    > Benign hypertensive heart disease with chronic systolic heart failure   > Diltiazem  mg PO once daily (6AM)   > Metoprolol tartrate 25 mg PO q 12 hr (9AM, 9PM)   > Losartan 25 mg PO once daily (12PM)    > Chronic atrial fibrillation, S/P Synchronized external cardioversion (4/18/2017 - Dr. Martha Lara), anticoagulated on Coumadin   > Amiodarone 400 mg PO BID to complete 14 days (until 4/21/2017), then decrease to 200 mg PO BID afterwards   > Diltiazem  mg PO once daily (6AM)   > Metoprolol tartrate 25 mg PO q 12 hr (9AM, 9PM)   > Target INR = 2.5 to 3.5   > Patient received Coumadin 2.5 mg PO last night   > INR 2.7   > Coumadin 1 mg PO tonight    > Chronic ulcer of right foot   > Heel suspension boot on both feet when supine in bed   > Wound care recommendations as per Podiatry: Betadine dressings, non compressive, with 4x4s, conform once daily   > Ascorbic acid 250 mg PO once daily   > Zinc sulfate 220 mg PO once daily     > Elevated TSH   > TSH (4/6/2017) = 4.77   > TSH in AM    > Hyperuricemia   > Uric acid (7/6/2016) = 9.8   > Uric acid in AM    > Analgesia   > Acetaminophen 650 mg PO q 4 hr PRN for pain level less than 5/10   > Duloxetine 60 mg PO once daily   > Gabapentin 100 mg PO BID   > Percocet 10/325 1 tab PO q 4 hr PRN for pain level greater than 4/10       Signed:    Nancy Rashid MD    April 21, 2017

## 2017-04-21 NOTE — PROGRESS NOTES
0730 Received report from off going RN. Patient alert and oriented. BilateraL groin incisions intact with edges well approximated. 1300 Sitting up in recliner chair. Denies needs. Callbell within reach,.    1530 IV lock and telemetry discontinued. Report called to Geoff Stock nurse in rehab unit.      1600 Patient to rehab unit by wheelchair with transporter with belongings

## 2017-04-22 PROBLEM — E03.9 HYPOTHYROIDISM: Chronic | Status: ACTIVE | Noted: 2017-04-06

## 2017-04-22 PROBLEM — E55.9 VITAMIN D DEFICIENCY: Chronic | Status: ACTIVE | Noted: 2017-04-22

## 2017-04-22 LAB
25(OH)D3 SERPL-MCNC: 12 NG/ML (ref 30–100)
ANION GAP BLD CALC-SCNC: 7 MMOL/L (ref 3–18)
BASOPHILS # BLD AUTO: 0 K/UL (ref 0–0.06)
BASOPHILS # BLD: 0 % (ref 0–3)
BUN SERPL-MCNC: 11 MG/DL (ref 7–18)
BUN/CREAT SERPL: 17 (ref 12–20)
CALCIUM SERPL-MCNC: 8.2 MG/DL (ref 8.5–10.1)
CHLORIDE SERPL-SCNC: 103 MMOL/L (ref 100–108)
CO2 SERPL-SCNC: 25 MMOL/L (ref 21–32)
CREAT SERPL-MCNC: 0.63 MG/DL (ref 0.6–1.3)
DIFFERENTIAL METHOD BLD: ABNORMAL
EOSINOPHIL # BLD: 0.2 K/UL (ref 0–0.4)
EOSINOPHIL NFR BLD: 2 % (ref 0–5)
ERYTHROCYTE [DISTWIDTH] IN BLOOD BY AUTOMATED COUNT: 15.9 % (ref 11.6–14.5)
FERRITIN SERPL-MCNC: 779 NG/ML (ref 8–388)
FOLATE SERPL-MCNC: 10 NG/ML (ref 3.1–17.5)
GLUCOSE SERPL-MCNC: 94 MG/DL (ref 74–99)
HCT VFR BLD AUTO: 23.2 % (ref 36–48)
HGB BLD-MCNC: 8 G/DL (ref 13–16)
INR PPP: 2.7 (ref 0.8–1.2)
IRON SATN MFR SERPL: 11 %
IRON SERPL-MCNC: 21 UG/DL (ref 50–175)
LYMPHOCYTES # BLD AUTO: 10 % (ref 20–51)
LYMPHOCYTES # BLD: 1.1 K/UL (ref 0.8–3.5)
MAGNESIUM SERPL-MCNC: 1.8 MG/DL (ref 1.6–2.6)
MCH RBC QN AUTO: 30.1 PG (ref 24–34)
MCHC RBC AUTO-ENTMCNC: 34.5 G/DL (ref 31–37)
MCV RBC AUTO: 87.2 FL (ref 74–97)
MONOCYTES # BLD: 1.2 K/UL (ref 0–1)
MONOCYTES NFR BLD AUTO: 11 % (ref 2–9)
NEUTS SEG # BLD: 8.4 K/UL (ref 1.8–8)
NEUTS SEG NFR BLD AUTO: 77 % (ref 42–75)
PLATELET # BLD AUTO: 521 K/UL (ref 135–420)
PLATELET COMMENTS,PCOM: ABNORMAL
PMV BLD AUTO: 9.2 FL (ref 9.2–11.8)
POTASSIUM SERPL-SCNC: 4.4 MMOL/L (ref 3.5–5.5)
PROTHROMBIN TIME: 27.1 SEC (ref 11.5–15.2)
RBC # BLD AUTO: 2.66 M/UL (ref 4.7–5.5)
RBC MORPH BLD: ABNORMAL
RETICS/RBC NFR AUTO: 3.7 % (ref 0.5–2.3)
SODIUM SERPL-SCNC: 135 MMOL/L (ref 136–145)
T4 FREE SERPL-MCNC: 1.2 NG/DL (ref 0.7–1.5)
TIBC SERPL-MCNC: 196 UG/DL (ref 250–450)
TSH SERPL DL<=0.05 MIU/L-ACNC: 5.08 UIU/ML (ref 0.36–3.74)
URATE SERPL-MCNC: 2.9 MG/DL (ref 2.6–7.2)
VIT B12 SERPL-MCNC: 968 PG/ML (ref 211–911)
WBC # BLD AUTO: 10.9 K/UL (ref 4.6–13.2)

## 2017-04-22 PROCEDURE — 82306 VITAMIN D 25 HYDROXY: CPT | Performed by: INTERNAL MEDICINE

## 2017-04-22 PROCEDURE — 85610 PROTHROMBIN TIME: CPT | Performed by: INTERNAL MEDICINE

## 2017-04-22 PROCEDURE — 85025 COMPLETE CBC W/AUTO DIFF WBC: CPT | Performed by: INTERNAL MEDICINE

## 2017-04-22 PROCEDURE — 84443 ASSAY THYROID STIM HORMONE: CPT | Performed by: INTERNAL MEDICINE

## 2017-04-22 PROCEDURE — 97535 SELF CARE MNGMENT TRAINING: CPT

## 2017-04-22 PROCEDURE — 84550 ASSAY OF BLOOD/URIC ACID: CPT | Performed by: INTERNAL MEDICINE

## 2017-04-22 PROCEDURE — 74011250637 HC RX REV CODE- 250/637: Performed by: INTERNAL MEDICINE

## 2017-04-22 PROCEDURE — 74011250636 HC RX REV CODE- 250/636: Performed by: INTERNAL MEDICINE

## 2017-04-22 PROCEDURE — A6209 FOAM DRSG <=16 SQ IN W/O BDR: HCPCS

## 2017-04-22 PROCEDURE — 97166 OT EVAL MOD COMPLEX 45 MIN: CPT

## 2017-04-22 PROCEDURE — 83540 ASSAY OF IRON: CPT | Performed by: INTERNAL MEDICINE

## 2017-04-22 PROCEDURE — 97530 THERAPEUTIC ACTIVITIES: CPT

## 2017-04-22 PROCEDURE — 84439 ASSAY OF FREE THYROXINE: CPT | Performed by: INTERNAL MEDICINE

## 2017-04-22 PROCEDURE — 85045 AUTOMATED RETICULOCYTE COUNT: CPT | Performed by: INTERNAL MEDICINE

## 2017-04-22 PROCEDURE — 65310000000 HC RM PRIVATE REHAB

## 2017-04-22 PROCEDURE — 83735 ASSAY OF MAGNESIUM: CPT | Performed by: INTERNAL MEDICINE

## 2017-04-22 PROCEDURE — 82728 ASSAY OF FERRITIN: CPT | Performed by: INTERNAL MEDICINE

## 2017-04-22 PROCEDURE — 80048 BASIC METABOLIC PNL TOTAL CA: CPT | Performed by: INTERNAL MEDICINE

## 2017-04-22 PROCEDURE — 36415 COLL VENOUS BLD VENIPUNCTURE: CPT | Performed by: INTERNAL MEDICINE

## 2017-04-22 PROCEDURE — 82607 VITAMIN B-12: CPT | Performed by: INTERNAL MEDICINE

## 2017-04-22 RX ORDER — LANOLIN ALCOHOL/MO/W.PET/CERES
1 CREAM (GRAM) TOPICAL
Status: DISCONTINUED | OUTPATIENT
Start: 2017-04-23 | End: 2017-04-29 | Stop reason: HOSPADM

## 2017-04-22 RX ORDER — ASCORBIC ACID 250 MG
250 TABLET ORAL
Status: DISCONTINUED | OUTPATIENT
Start: 2017-04-23 | End: 2017-04-29 | Stop reason: HOSPADM

## 2017-04-22 RX ORDER — LEVOTHYROXINE SODIUM 100 UG/1
100 TABLET ORAL
Status: DISCONTINUED | OUTPATIENT
Start: 2017-04-22 | End: 2017-04-23

## 2017-04-22 RX ORDER — CHOLECALCIFEROL (VITAMIN D3) 125 MCG
5000 CAPSULE ORAL DAILY
Status: DISCONTINUED | OUTPATIENT
Start: 2017-04-23 | End: 2017-04-29 | Stop reason: HOSPADM

## 2017-04-22 RX ORDER — WARFARIN 1 MG/1
2 TABLET ORAL ONCE
Status: COMPLETED | OUTPATIENT
Start: 2017-04-22 | End: 2017-04-22

## 2017-04-22 RX ADMIN — OXYCODONE HYDROCHLORIDE AND ACETAMINOPHEN 1 TABLET: 10; 325 TABLET ORAL at 18:02

## 2017-04-22 RX ADMIN — OXYCODONE HYDROCHLORIDE AND ACETAMINOPHEN 1 TABLET: 10; 325 TABLET ORAL at 13:38

## 2017-04-22 RX ADMIN — OXYCODONE HYDROCHLORIDE AND ACETAMINOPHEN 1 TABLET: 10; 325 TABLET ORAL at 22:00

## 2017-04-22 RX ADMIN — OXYCODONE HYDROCHLORIDE AND ACETAMINOPHEN 1 TABLET: 10; 325 TABLET ORAL at 05:57

## 2017-04-22 RX ADMIN — Medication 50000 UNITS: at 13:38

## 2017-04-22 RX ADMIN — DULOXETINE HYDROCHLORIDE 60 MG: 60 CAPSULE, DELAYED RELEASE ORAL at 09:09

## 2017-04-22 RX ADMIN — DOCUSATE SODIUM 100 MG: 100 CAPSULE, LIQUID FILLED ORAL at 17:22

## 2017-04-22 RX ADMIN — LACTOBACILLUS TAB 2 TABLET: TAB at 17:22

## 2017-04-22 RX ADMIN — AMIODARONE HYDROCHLORIDE 400 MG: 200 TABLET ORAL at 17:22

## 2017-04-22 RX ADMIN — ZINC SULFATE 220 MG (50 MG) CAPSULE 220 MG: CAPSULE at 09:09

## 2017-04-22 RX ADMIN — LACTOBACILLUS TAB 2 TABLET: TAB at 08:51

## 2017-04-22 RX ADMIN — LEVOTHYROXINE SODIUM 100 MCG: 100 TABLET ORAL at 13:38

## 2017-04-22 RX ADMIN — GABAPENTIN 100 MG: 100 CAPSULE ORAL at 08:52

## 2017-04-22 RX ADMIN — WARFARIN SODIUM 2 MG: 1 TABLET ORAL at 17:22

## 2017-04-22 RX ADMIN — LOSARTAN POTASSIUM 25 MG: 25 TABLET ORAL at 12:44

## 2017-04-22 RX ADMIN — GABAPENTIN 100 MG: 100 CAPSULE ORAL at 17:22

## 2017-04-22 RX ADMIN — ERYTHROPOIETIN 10000 UNITS: 10000 INJECTION, SOLUTION INTRAVENOUS; SUBCUTANEOUS at 13:38

## 2017-04-22 RX ADMIN — METOPROLOL TARTRATE 25 MG: 25 TABLET ORAL at 22:00

## 2017-04-22 RX ADMIN — AMIODARONE HYDROCHLORIDE 400 MG: 200 TABLET ORAL at 08:51

## 2017-04-22 RX ADMIN — ASPIRIN 81 MG CHEWABLE TABLET 81 MG: 81 TABLET CHEWABLE at 08:51

## 2017-04-22 RX ADMIN — CLOPIDOGREL BISULFATE 75 MG: 75 TABLET, FILM COATED ORAL at 17:22

## 2017-04-22 RX ADMIN — METOPROLOL TARTRATE 25 MG: 25 TABLET ORAL at 08:51

## 2017-04-22 RX ADMIN — DOCUSATE SODIUM 100 MG: 100 CAPSULE, LIQUID FILLED ORAL at 08:51

## 2017-04-22 RX ADMIN — ATORVASTATIN CALCIUM 40 MG: 40 TABLET, FILM COATED ORAL at 22:00

## 2017-04-22 NOTE — PROGRESS NOTES
NUTRITION    Nutrition Consult: General Nutrition Management and Supplements     RECOMMENDATIONS / PLAN:     - Add nutritional supplements: Ensure Enlive TID  - Continue RD inpatient monitoring and evaluation. NUTRITION INTERVENTIONS & DIAGNOSIS:     [x] Meals/Snacks:  Modified diet  [x] Medical food supplementation: initiate Ensure Enlive TID    Nutrition Diagnosis: Increased nutrient needs related to wound healing as evidenced by surgical incisions and pressure ulcer. ASSESSMENT:     Subjective/Objective: Tolerating diet. No concerns. Discussed resuming supplements. Average po intake adequate to meet patients estimated nutritional needs:   [x] Yes     [] No   [] Unable to determine at this time    Diet: DIET CARDIAC Regular; 3-4 GM ([facility])      Food Allergies: NKFA  Current Appetite:   [x] Good     [] Fair     [] Poor     [] Other:  Appetite/meal intake prior to admission:   [] Good     [x] Fair     [] Poor     [] Other:  Feeding Limitations:  [] Swallowing difficulty    [] Chewing difficulty    [] Other:  Current Meal Intake: Patient Vitals for the past 100 hrs:   % Diet Eaten   04/22/17 0900 75 %       BM: 4/21  Skin Integrity: surgical incisions to right axilla, right groin, left groin, right thigh; unstageable pressure ulcer to heel, pressure ulcer to right heel; vascular would to right calf and right ankle  Edema: trace genital, LEs  Pertinent Medications: Reviewed: cyanocobalamin     Recent Labs      04/22/17   0645   NA  135*   K  4.4   CL  103   CO2  25   GLU  94   BUN  11   CREA  0.63   CA  8.2*   MG  1.8       Intake/Output Summary (Last 24 hours) at 04/22/17 1240  Last data filed at 04/22/17 0900   Gross per 24 hour   Intake              200 ml   Output             1400 ml   Net            -1200 ml       Anthropometrics:  Ht Readings from Last 1 Encounters:   03/30/17 5' 10\" (1.778 m)     There were no vitals filed for this visit. There is no height or weight on file to calculate BMI. Weight History: reports weight loss from 190# over the last month. -10#, 5.3% x 1 month    Weight Metrics 4/21/2017 4/4/2017 3/29/2017 3/17/2017 3/16/2017 3/8/2017 2/27/2017   Weight 180 lb 11.2 oz - 170 lb 170 lb 170 lb 9.6 oz 189 lb 179 lb   BMI - 25.93 kg/m2 24.39 kg/m2 24.39 kg/m2 24.48 kg/m2 27.12 kg/m2 25.68 kg/m2        Admitting Diagnosis: Debility   Status post femoral-popliteal bypass surgery  Past Medical History:   Diagnosis Date    Acute blood loss as cause of postoperative anemia 4/4/2017    Anticoagulated on Coumadin     Aortoiliac occlusive disease ([address] 1/25/2017    Atherosclerosis of native artery of both lower extremities with intermittent claudication ([address] 1/25/2017    Benign hypertensive heart disease with systolic congestive heart failure, NYHA class 2 ([address] 1/26/2015    Carotid artery disease (HCC)     Chronic atrial fibrillation (HCC)     Chronic systolic heart failure (HCC)     Chronic ulcer of right foot ([address] 4/4/2017    Coronary artery disease involving native coronary artery of native heart 3/15/2017    Successful stenting of Cx (Xience LESLEY) and RCA (Xience LESLEY) to 0% by Dr. Lupillo Ling on 3/15/17.     DDD (degenerative disc disease), lumbar 1/26/2015    Dyslipidemia     Elevated TSH 4/6/2017    Erectile dysfunction 7/5/2016    Hereditary peripheral neuropathy 11/15/2016    History of cardioversion 4/18/2017    S/P Synchronized external cardioversion (4/18/2017 - Dr. Katya Vo)    Hyperuricemia     Ischemic cardiomyopathy     Peripheral artery disease ([facility] 1/25/2017    Spinal stenosis of lumbar region with radiculopathy 5/4/2015    Dr. Sudhakar Padilla       Education Needs:        [x] None identified  [] Identified - Not appropriate at this time  []  Identified and addressed - refer to education log  Learning Limitations:   [x] None identified  [] Identified    Cultural, Restoration & ethnic food preferences:  [x] None identified    [] Identified and addressed ESTIMATED NUTRITION NEEDS:     Calories: 1475-0606 kcal (30-35 kcal/kg) based on  [x] Actual BW: 81 kg      [] IBW   Protein: 105-121 gm (1.3-1.5 gm/kg) based on  [x] Actual BW      [] IBW   Fluid: 1 mL/kcal     MONITORING & EVALUATION:     Nutrition Goal(s):   1. Po intake of meals will meet >75% of patient estimated nutritional needs within the next 7 days.   Outcome:  [] Met/Ongoing    []  Not Met    [x] New/Initial Goal     Monitoring:   [x] Diet tolerance   [x] Meal intake   [x] Supplement intake   [] GI symptoms/ability to tolerate po diet   [] Respiratory status   [] Plan of care      Previous Recommendations (for follow-up assessments only):     [x]   Implemented       []   Not Implemented (RD to address)     [] No Recommendation Made     Discharge Planning: cardiac diet     [x] Participated in care planning, discharge planning, & interdisciplinary rounds as appropriate      Nilesh Pope, [address] [place] [address]   Pager: [phone]

## 2017-04-22 NOTE — H&P
[facility] PHYSICAL REHABILITATION  [address] [place] [address], [address]     INPATIENT REHABILITATION  HISTORY AND PHYSICAL  (Post Admission Physician Evaluation)    Name: Lillie Tristan CSN: [id number]   Age: 59 y.o. MRN: [id number]   Sex: male Admit Date: 4/21/2017     PCP: Dr. Awais Wu      Primary Rehab Impairment Category (MARTELL): Miscellaneous    Impairment Group Label: Debility    Etiologic Diagnosis: Critical ischemia of the right lower limb secondary to peripheral arterial disease      Subjective:     Patient seen and examined. History of the Present Illness: The patient is a 57-year-old White male with multiple medical comorbidities who was admitted to [facility] on 4/4/2017 for elective revascularization surgery of the lower extremities due to rest pain. The patient was being managed by Physical Medicine and Rehabilitation (Dr. Callie aMtthews) for lower back pain with numbness of both lower extremities attributed to degenerative disc disease of the lumbar spine / lumbar spinal stenosis with radiculopathy. MRI of the lumbar spine done 11/25/2017 showed significant aortic stenosis which is progressed since prior imaging; there is possible occlusion of the left common iliac artery which may been present previously. On the patient's last visit with Dr. Angelique Moya on 12/13/2016, it was felt that the patient symptoms were multifactorial and not totally attributable to the degenerative disc disease of the lumbar spine / lumbar spinal stenosis with radiculopathy. It was felt that a vascular component is contributing to the symptoms. The patient was referred to Vascular Surgery (Dr. Esmer Acosta).  CT angiogram of the abdomen with arterial runoff done 1/24/2017 showed significant distal aortic stenosis with an infrarenal abdominal aorta and extensive atherosclerotic soft plaque/mural thrombus with a minimal luminal diameter of approximately 8 mm; significant arterial inflow disease of the left leg with occlusion of the left common iliac artery at its origin with the left external iliac artery also remaining largely occluded; the left common femoral artery/distal left external iliac artery reconstitutes via collaterals; there is extensive multifocal atherosclerotic irregularity and stenoses in the left SFA, popliteal artery, and  calf arteries; there is at least a two-vessel runoff to the left foot; the peroneal artery at the level of the ankle is nonopacified, which could be due to occlusion or contrast bolus timing; significant arterial inflow disease of the right leg with high-grade stenosis near the origin of the right common iliac artery with additional mild stenosis in its midportion; the right external iliac artery is patent with moderate stenosis just above the inguinal ligament; additional right lower extremity arterial stenoses as described above with a three-vessel runoff to the right foot. Duplex ultrasound of the arteries of the lower extremities done 1/24/2017 showed significant stenosis in the external illiac artery bilaterally; moderate calcified plaque noted from common femoral artery to popliteal artery bilaterally with continous flow; both calves vessels are patent with continous flow with moderate atherosclerotic plaque; the level of disease appeared to multilevel in both legs starting at the aorto illiac segments; the right ankle/brachial index is 0.36; the left ankle/brachial index is 0.43. Duplex carotid artery ultrasound done 2/27/2017 showed probable occlusion of the right internal carotid artery, cannot exclude extremely low flow; mild plaquing of the left internal carotid artery in the less than 50% range; no significant stenosis in the external carotid arteries bilaterally. Surgery was recommended by Dr. Danielle Henry and in preparation for the surgery, the patient was referred to Cardiology (Dr. Delmer Segovia).  The patient underwent cardiac catheterization on 3/8/2017 and stenting of the circumflex artery and the RCA on 3/15/2017 done by Dr. Jhon Dakin. The patient was admitted to [facility] on 4/4/2017 under the service of Vascular Surgery (Dr. Darline Cunningham). The patient underwent a Right axillary to bifemoral artery bypass using an 8 mm Propaten graft from the right axilla to the right common femoral artery with a jump femoral-femoral bypass with another 8 mm Propaten external ring bypass; Right common femoral artery, and profunda femoris artery and superficial femoral artery endarterectomy causing an extensive surgery on the right side on 4/4/2017 done by Dr. Darline Cunningham. The patient tolerated the procedure well with no intraoperative complications. Cardiology consult (Dr. Coy Ramirez) was called for evaluation and comanagement of atrial fibrillation. Nephrology consult (Dr. Alex Fabian) was called for evaluation and comanagement of acute renal failure/acute kidney injury. The patient was brought back to the operating room and underwent a Cutdown of femoral-femoral bypass, more on the left groin side; Right lower extremity angiography with first-order catheterization on 4/7/2017 done by Dr. Darline Cunningham. Despite the surgery, the patient still had continued rest pain in the right leg/foot. The patient was brouhgt back to the operating room and underwent a Right femoral to above-knee popliteal bypass with an 8 mm PTFE graft on 4/10/2017 done by Dr. Sidney Strauss. The patient tolerated the procedure well with no intraoperative complications. After undergoing the three surgical procedures during the hospital admission, the patient developed acute postoperative blood loss anemia but no blood transfusion was given. Pain was controlled with oral narcotics. Enoxaparin was started for cerebral thromboembolism/DVT prophylaxis. Coumadin was overlapped.  Podiatry consult (Dr. Socorro Chung) was called for evaluation and comanagement of the right leg/foot ulcers. No surgical intervention was recommended for the ulcers. Orders for dressing were given. Patient was instructed to use a Multipodus boot on the RLE. On 4/17/2017, the patient had an episode of altered mental status so the [facility] (Dr. Isak Ignacio) was consulted for evaluation and comanagement. Mental status had improved after holding narcotics. Due to the atrial fibrillation, the patient was placed on a Diltiazem drip and Metoprolol tartrate. The patient underwent a Synchronized external cardioversion on 4/18/2017 done by Dr. Christiano Bahena. The patient tolerated the procedure well with no intraoperative complications. Diltiazem drip was transitioned to Diltiazem CD. Metoprolol tartrate dose was increased. Amiodarone was added. Enoxaparin was discontinued when INR was 2.0 on 4/19/2017. Cardiology recommended to continued DAPT (ASA and Clopidogrel) for PAD. Target goal for INR was set at 2.5 to 3.5. The patient had remained hemodynamically stable but due to the above events, the patient was noted to be generally weak and with impaired mobility and ADLs. Patient was felt to be a good candidate for acute inpatient rehabilitation. Upon evaluation by Physical Therapy and Occupational Therapy, the patient was recommended for acute inpatient rehabilitation. The patient was discharged and was subsequently admitted to the [facility] for intensive rehabilitation to help recover strength, function and mobility.       Past Medical History:  Past Medical History:   Diagnosis Date    Acute blood loss as cause of postoperative anemia 4/4/2017    Anticoagulated on Coumadin     Aortoiliac occlusive disease ([address] 1/25/2017    Atherosclerosis of native artery of both lower extremities with intermittent claudication ([address] 1/25/2017    Benign hypertensive heart disease with systolic congestive heart failure, NYHA class 2 ([facility] 1/26/2015    Carotid artery disease (HCC)     Chronic atrial fibrillation (HCC)     Chronic systolic heart failure (HCC)     Chronic ulcer of right foot ([facility] 4/4/2017    Coronary artery disease involving native coronary artery of native heart 3/15/2017    Successful stenting of Cx (Xience LESLEY) and RCA (Xience LESLEY) to 0% by Dr. Champ Herbert on 3/15/17.     DDD (degenerative disc disease), lumbar 1/26/2015    Dyslipidemia     Elevated TSH 4/6/2017    Erectile dysfunction 7/5/2016    Hereditary peripheral neuropathy 11/15/2016    History of cardioversion 4/18/2017    S/P Synchronized external cardioversion (4/18/2017 - Dr. Adonis Leggett)    Hyperuricemia     Ischemic cardiomyopathy     Peripheral artery disease ([facility] 1/25/2017    Spinal stenosis of lumbar region with radiculopathy 5/4/2015    Dr. Steele Held       Past Surgical History:  Past Surgical History:   Procedure Laterality Date    CARDIAC CATHETERIZATION  3/8/2017         CARDIAC CATHETERIZATION  3/15/2017         CORONARY STENT SINGLE W/PTCA  3/15/2017         HX COLONOSCOPY  07/23/2015    polyps repeat 2020 5 years Kaykay Henderson    HX FEMORAL BYPASS Right 04/04/2017    S/P Right axillary to bifemoral artery bypass using an 8 mm Propaten graft from the right axilla to the right common femoral artery with a jump femoral-femoral bypass with another 8 mm Propaten external ring bypass; Right common femoral artery, and profunda femoris artery and superficial femoral artery endarterectomy causing an extensive surgery on the right side (4/4/2017 - Dr. Ben Gutierrez)    [address] Right 04/07/2017    S/P Cutdown of femoral-femoral bypass, more on the left groin side; Right lower extremity angiography with first-order catheterization (4/7/2017 - Dr. Clarence Man)    HX FEMORAL BYPASS Right 04/10/2017    S/P Right femoral to above-knee popliteal bypass with an 8 mm PTFE graft (4/10/2017 - Dr. Jennifer Myers Davidson)       Allergies:  No Known Allergies      Social History: The patient is legally , lives alone in a mobile home/trailer with a 3-step entry in [place], [place]. Patient is an ex-cigarette smoker who quit > 10 years ago. He is an ex-alcoholic beverage drinker who quit > 1 month ago. He last smoked marijuana ~6 months ago. He presently works as a . He has a daughter who lives in [place], [place]. Family History: Both parents are . Family History   Problem Relation Age of Onset    Heart Disease Father        Transfer Medications (from the transfer summary prepared by Dr. Lisa Orozco):    Prior to Admission Medications   Prescriptions Last Dose Informant Patient Reported? Taking? DULoxetine (CYMBALTA) 60 mg capsule   No No   Sig: Take 1 Cap by mouth daily. POTASSIUM PHOS,M-BASIC-D-BASIC (POTASSIUM PHOSPHATE PO)   Yes No   Sig: Take  by mouth. Take as directed   amLODIPine (NORVASC) 10 mg tablet   No No   Sig: Take 1 Tab by mouth daily. amiodarone (PACERONE) 400 mg tablet   No No   Sig: Take 1 Tab by mouth two (2) times a day. aspirin 81 mg chewable tablet   Yes No   Sig: Take 81 mg by mouth daily. atorvastatin (LIPITOR) 40 mg tablet   No No   Sig: Take 1 Tab by mouth daily. avanafil (STENDRA) 100 mg tablet   No No   Sig: Take 1 Tab by mouth as needed for Other. Take 1 tab 15-30 mins prior to sexual acitivity   clopidogrel (PLAVIX) 75 mg tab   No No   Sig: Take 1 Tab by mouth daily. cyanocobalamin (VITAMIN B-12) 1,000 mcg tablet   No No   Sig: Take 1 Tab by mouth daily. dilTIAZem CD (CARDIZEM CD) 120 mg ER capsule   No No   Sig: Take 1 Cap by mouth daily. hydrochlorothiazide (HYDRODIURIL) 25 mg tablet   No No   Sig: Take 1 Tab by mouth daily. losartan (COZAAR) 100 mg tablet   No No   Sig: Take 1 Tab by mouth daily. metoprolol tartrate (LOPRESSOR) 25 mg tablet   No No   Sig: Take 1 Tab by mouth every twelve (12) hours.    oxyCODONE-acetaminophen (PERCOCET 10)  mg per tablet   No No   Sig: Take 1 Tab by mouth every four (4) hours as needed. Max Daily Amount: 6 Tabs. warfarin (COUMADIN) 2.5 mg tablet   No No   Sig: Take 1 Tab by mouth daily. Review Of Systems:   CONSTITUTIONAL: No weight loss. EYES: No blurred vision and no eye discharge. ENT: No nasal discharge. No ear pain. CARDIOVASCULAR: No chest pain and no diaphoresis. RESPIRATORY: No cough, no hemoptysis. GI: No vomiting, no diarrhea   : No urinary frequency and no dysuria. MUSCULOSKELETAL: Significant for acute postoperative musculoskeletal pain. SKIN: No rashes. NEURO: No dizziness, no numbness. ENDOCRINE: No polyphagia and no polydipsia. HEMATOLOGY: Significant for acute postoperative blood loss anemia. Objective:     Vital Signs:  Patient Vitals for the past 24 hrs:   BP Temp Pulse Resp SpO2   04/22/17 1244 122/60 - - - -   04/22/17 0851 136/61 98 °F (36.7 °C) 87 18 100 %   04/22/17 0600 103/58 - 82 - -   04/21/17 2042 125/63 - 90 - -   04/21/17 1935 125/63 99.1 °F (37.3 °C) 90 20 99 %   04/21/17 1800 127/67 98.5 °F (36.9 °C) 100 20 100 %        There is no height or weight on file to calculate BMI. Physical Examination:  GENERAL SURVEY: Patient is awake, alert, oriented x 3, sitting comfortably on the chair, not in acute respiratory distress.   HEENT: pale palpebral conjunctivae, anicteric sclerae, no nasoaural discharge, moist oral mucosa  NECK: supple, no jugular venous distention, no palpable lymph nodes  CHEST/LUNGS: symmetrical chest expansion, good air entry, clear breath sounds  HEART: adynamic precordium, good S1 S2, no S3, regular rhythm, no murmurs  ABDOMEN: flat, bowel sounds appreciated, soft, non-tender  EXTREMITIES: (+) necrotic areas noted on plantar surface of right heel, lateral surface of right calf area and nailbase of right great toe, pale nailbeds, (+) Grade 1-2 edema on right leg, full and equal pulses, no calf tenderness NEUROLOGICAL EXAM: The patient is awake, alert and oriented x3, able to answer questions fairly appropriately, able to follow 1 and 2 step commands. Able to tell time from the wall clock. Cranial nerves II-XII are grossly intact. No gross sensory deficit. Motor strength is 4+/5 on BUE and the LLE, 4- to 4/5 on the RLE (except for 2-/5 on the right ankle and 1/5 on toes of right foot).     Post-op Incision(s): healing well, clean, dry, and intact      Current Medications:  Current Facility-Administered Medications   Medication Dose Route Frequency    epoetin constance (EPOGEN;PROCRIT) injection 10,000 Units  10,000 Units SubCUTAneous ONCE    warfarin (COUMADIN) tablet 2 mg  2 mg Oral ONCE    acetaminophen (TYLENOL) tablet 650 mg  650 mg Oral Q4H PRN    docusate sodium (COLACE) capsule 100 mg  100 mg Oral BID    bisacodyl (DULCOLAX) tablet 10 mg  10 mg Oral Q48H PRN    amiodarone (CORDARONE) tablet 400 mg  400 mg Oral BID    dilTIAZem CD (CARDIZEM CD) capsule 120 mg  120 mg Oral DAILY    oxyCODONE-acetaminophen (PERCOCET 10)  mg per tablet 1 Tab  1 Tab Oral Q4H PRN    metoprolol tartrate (LOPRESSOR) tablet 25 mg  25 mg Oral Q12H    clopidogrel (PLAVIX) tablet 75 mg  75 mg Oral DAILY WITH DINNER    aspirin chewable tablet 81 mg  81 mg Oral DAILY WITH BREAKFAST    losartan (COZAAR) tablet 25 mg  25 mg Oral DAILY    DULoxetine (CYMBALTA) capsule 60 mg  60 mg Oral DAILY    Lactobacillus Acidoph & Bulgar (FLORANEX) tablet 2 Tab  2 Tab Oral BID    [START ON 5/3/2017] amiodarone (CORDARONE) tablet 200 mg  200 mg Oral BID    WARFARIN INFORMATION NOTE (COUMADIN)   Other Q24H    gabapentin (NEURONTIN) capsule 100 mg  100 mg Oral BID    zinc sulfate (ZINCATE) capsule 220 mg  1 Cap Oral DAILY    atorvastatin (LIPITOR) tablet 40 mg  40 mg Oral QHS       Functional Assessment:     Occupational Therapy   Prior Level of Function  Pre-Admission Screen  Post-Admission Evaluation   Eating   Independent Eating Independent Eating  Functional Level: 5   Grooming   Independent Grooming   Minimal Assist Grooming  Functional Level: 7   Upper Body Dressing   Independent Upper Body Dressing   Minimal Assist Upper Body Dressing  Functional Level: 5   Lower Body Dressing   Independent Lower Body Dressing   Minimal Assist Lower Body Dressing  Functional Level: 4   Bladder Management   Independent Bladder Management   Minimal Assist Toileting  Functional Level: 4   Bowel Management   Independent Bowel Management   Minimal Assist      Physical Therapy   Prior Level of Function  Pre-Admission Screen  Post-Admission Evaluation   Ambulation   Independent Ambulation   Minimal Assist     Bed Mobility   Independent Bed Mobility   Supervision     Supine to Sit   Independent Supine to Sit   Supervision     Sit to Stand   Independent Sit to Stand   Minimal Assist     Bed/Chair Transfers   Independent Bed/Chair Transfers   Minimal Assist     Toilet Transfers   Independent Toilet Transfers   Minimal Assist Toilet Transfers  Toilet Transfer Score: 4     Speech and Language Pathology  Post-Admission Evaluation     Comprehension (Native Language)  Primary Mode of Comprehension: Auditory  Score: 5     Expression (Native Language)  Primary Mode of Expression: Verbal  Score: 4     Social Interaction/Pragmatics  Score: 5     Problem Solving  Score: 4     Memory  Score: 4       Legend:   7 - Independent   6 - Modified Independent   5 - Standby Assistance / Supervision / Set-up   4 - Minimum Assistance / Contact Guard Assistance   3 - Moderate Assistance   2 - Maximum Assistance   1 - Total Assistance / Dependent       Labs on Admission:  Recent Results (from the past 24 hour(s))   CBC WITH AUTOMATED DIFF    Collection Time: 04/22/17  6:45 AM   Result Value Ref Range    WBC 10.9 4.6 - 13.2 K/uL    RBC 2.66 (L) 4.70 - 5.50 M/uL    HGB 8.0 (L) 13.0 - 16.0 g/dL    HCT 23.2 (L) 36.0 - 48.0 %    MCV 87.2 74.0 - 97.0 FL    MCH 30.1 24.0 - 34.0 PG    MCHC 34.5 31.0 - 37.0 g/dL    RDW 15.9 (H) 11.6 - 14.5 %    PLATELET 286 (H) 922 - 420 K/uL    MPV 9.2 9.2 - 11.8 FL    NEUTROPHILS 77 (H) 42 - 75 %    LYMPHOCYTES 10 (L) 20 - 51 %    MONOCYTES 11 (H) 2 - 9 %    EOSINOPHILS 2 0 - 5 %    BASOPHILS 0 0 - 3 %    ABS. NEUTROPHILS 8.4 (H) 1.8 - 8.0 K/UL    ABS. LYMPHOCYTES 1.1 0.8 - 3.5 K/UL    ABS. MONOCYTES 1.2 (H) 0 - 1.0 K/UL    ABS. EOSINOPHILS 0.2 0.0 - 0.4 K/UL    ABS.  BASOPHILS 0.0 0.0 - 0.06 K/UL    DF MANUAL      PLATELET COMMENTS INCREASED PLATELETS      RBC COMMENTS ANISOCYTOSIS  1+       FERRITIN    Collection Time: 04/22/17  6:45 AM   Result Value Ref Range    Ferritin 779 (H) 8 - 388 NG/ML   IRON PROFILE    Collection Time: 04/22/17  6:45 AM   Result Value Ref Range    Iron 21 (L) 50 - 175 ug/dL    TIBC 196 (L) 250 - 450 ug/dL    Iron % saturation 11 %   MAGNESIUM    Collection Time: 04/22/17  6:45 AM   Result Value Ref Range    Magnesium 1.8 1.6 - 2.6 mg/dL   METABOLIC PANEL, BASIC    Collection Time: 04/22/17  6:45 AM   Result Value Ref Range    Sodium 135 (L) 136 - 145 mmol/L    Potassium 4.4 3.5 - 5.5 mmol/L    Chloride 103 100 - 108 mmol/L    CO2 25 21 - 32 mmol/L    Anion gap 7 3.0 - 18 mmol/L    Glucose 94 74 - 99 mg/dL    BUN 11 7.0 - 18 MG/DL    Creatinine 0.63 0.6 - 1.3 MG/DL    BUN/Creatinine ratio 17 12 - 20      GFR est AA >60 >60 ml/min/1.73m2    GFR est non-AA >60 >60 ml/min/1.73m2    Calcium 8.2 (L) 8.5 - 10.1 MG/DL   PROTHROMBIN TIME + INR    Collection Time: 04/22/17  6:45 AM   Result Value Ref Range    Prothrombin time 27.1 (H) 11.5 - 15.2 sec    INR 2.7 (H) 0.8 - 1.2     RETICULOCYTE COUNT    Collection Time: 04/22/17  6:45 AM   Result Value Ref Range    Reticulocyte count 3.7 (H) 0.5 - 2.3 %   VITAMIN D, 25 HYDROXY    Collection Time: 04/22/17  6:45 AM   Result Value Ref Range    Vitamin D 25-Hydroxy 12.0 (L) 30 - 100 ng/mL   VITAMIN B12 & FOLATE    Collection Time: 04/22/17  6:45 AM   Result Value Ref Range    Vitamin B12 968 (H) 211 - 911 pg/mL    Folate 10.0 3.10 - 17.50 ng/mL   TSH 3RD GENERATION    Collection Time: 04/22/17  6:45 AM   Result Value Ref Range    TSH 5.08 (H) 0.36 - 3.74 uIU/mL       Estimated Glomerular Filtration Rate:  On admission, based on a Creatinine of 0.63 mg/dl:   Estimated GFR using CKD-EPI = 107.9 mL/min/1.73m2   Estimated GFR using MDRD = 138.5 mL/min/1.73m2      Assessment:     Primary Rehabilitation Diagnosis  1. Impaired Mobility and ADLs  2. S/P Right axillary to bifemoral artery bypass using an 8 mm Propaten graft from the right axilla to the right common femoral artery with a jump femoral-femoral bypass with another 8 mm Propaten external ring bypass; Right common femoral artery, and profunda femoris artery and superficial femoral artery endarterectomy causing an extensive surgery on the right side (4/4/2017 - Dr. Jovan Lama); S/P Cutdown of femoral-femoral bypass, more on the left groin side; Right lower extremity angiography with first-order catheterization (4/7/2017 - Dr. Jovan Lama); S/P Right femoral to above-knee popliteal bypass with an 8 mm PTFE graft (4/10/2017 - Dr. Dari Min)  3.  History of critical ischemia of the right lower limb secondary to peripheral arterial disease    Comorbidities   Benign hypertensive heart disease with systolic congestive heart failure, NYHA class 2     History of vitamin B12 deficiency    DDD (degenerative disc disease), lumbar    Erectile dysfunction    Spinal stenosis of lumbar region with radiculopathy    Hereditary peripheral neuropathy    Aortoiliac occlusive disease     Atherosclerosis of native artery of both lower extremities with intermittent claudication     Peripheral artery disease     Coronary artery disease involving native coronary artery of native heart    Chronic atrial fibrillation     History of transient alteration of awareness, resolved    Acute blood loss as cause of postoperative anemia    Anticoagulated on Coumadin  Ischemic cardiomyopathy    Chronic systolic heart failure     Carotid artery disease     Dyslipidemia    Hyperuricemia    Hypothyroidism    Aftercare following surgery of the circulatory system    History of cardioversion    Chronic ulcer of right foot    Vitamin D deficiency       Willingness to participate in the program: Good      Rehabilitation Potential: Good      Plan:     1. Medical Issues being followed closely:    [x]  Fall and safety precautions     [x]  Wound Care     [x]  Bowel and Bladder Function     [x]  Fluid Electrolyte and Nutrition Balance     [x]  Pain Control      2. Issues that 24 hour rehabilitation nursing is following:    [x]  Fall and safety precautions     [x]  Wound Care     [x]  Bowel and Bladder Function     [x]  Fluid Electrolyte and Nutrition Balance     [x]  Pain Control      [x]  Assistance with and education on in-room safety with transfers to and from the bed, wheelchair, toilet and shower. 3. Acute rehabilitation plan of care:    [x]  Patient to be evaluated and treated by:           [x]  Physical Therapy           [x]  Occupational Therapy           []  Speech Therapy     []  Hold Rehab until further notice     5. Medications:    [x]  MAR Reviewed     [x]  Continue Present Medications     6. DVT Prophylaxis:      []  Lovenox     []  Unfractionated Heparin     [x]  Coumadin     []  NOAC     [x]  VASQUEZ Stockings     [x]  Sequential Compression Device     []  None     7. Rehabilitation program and expectations from patient, as well as medical issues discussed with the patient. REHABILITATION PLAN:    1. The patient is being admitted to a comprehensive acute inpatient rehabilitation program consisting of at least 180 minutes a day, 5 out of 7 days a week of  combined physical and occupational therapy, and close supervision by a physician with special training and experience in rehabilitation medicine.     2. The patient's prognosis for significant practical improvement within a reasonable period of time appears to be good. 3. The estimated length of stay is 12 days. 4. The patient/family has a good understanding of our discharge process. The patient has potential to make improvement and is in need of at least two of the following multidisciplinary therapies including but not limited to physical, occupational, speech, nutritional services, wound care, prosthetics and orthotics. The patient is expected to be able to return to home with home health therapy and family support. 5. Given the patient's multiple co-morbidities and risk for further medical complications, rehabilitation services could not be safely provided at a lower level of care such as a skilled nursing facility. 6. Physical therapy for therapeutic exercise, progressive mobility, gait training, transfer training, bed mobility training, patient and family education, and wheelchair mobility training. Physical therapy goals to address - extremity function, range of motion, balance, safety awareness, independence in transfers, activity tolerance, independence in bed mobility, and independence in ambulation. 7. Occupational therapy for self-care home management, transfer training, therapeutic exercise, activity, wheelchair mobility training. Occupational therapy goals to address - extremity function, cognition, balance, activity tolerance, independence in functional transfers, range of motion, safety awareness, independence in ADL  and independence in home management skills. 8. Specialized 24 hour rehabilitation nursing care for bowel and bladder retraining, disease management, pain management, pressure ulcer prevention and management per policy, education on pressure relief techniques, embolism prevention, nutrition management, hydration management, transfer training and   medication distribution.     9. Nutrition and Dietary services will be obtained for assessment of adequate calorie needs, hydration and calorie counts as appropriate. 10. Therapeutic recreation for leisure skills. 6. Rehab psychology for coping skills. 12. Social work services for patient and family counseling and safe discharge planning. 13. I will be in charge of the inpatient rehab program. Full details of the inpatient rehab program will be outlined in the initial team conference. REHABILITATION GOALS:  Improve functional and activities of daily living skills in order to return back to independent living with family support. MEDICAL PLAN:  > S/P Right axillary to bifemoral artery bypass using an 8 mm Propaten graft from the right axilla to the right common femoral artery with a jump femoral-femoral bypass with another 8 mm Propaten external ring bypass; Right common femoral artery, and profunda femoris artery and superficial femoral artery endarterectomy causing an extensive surgery on the right side (4/4/2017 - Dr. Nando Weaver); S/P Cutdown of femoral-femoral bypass, more on the left groin side; Right lower extremity angiography with first-order catheterization (4/7/2017 - Dr. Nando Weaver); S/P Right femoral to above-knee popliteal bypass with an 8 mm PTFE graft (4/10/2017 - Dr. Tiffany Herrera);  History of critical ischemia of the right lower limb secondary to peripheral arterial disease   > Aspirin 81 mg PO once daily with breakfast   > Atorvastatin 40 mg PO q HS   > Clopidogrel 75 mg PO once daily with dinner    > Acute Postoperative Blood Loss Anemia   > Hgb/Hct (3/15/2017, prior to admission to [facility]) = 14.3/40.8   > Hgb/Hct (4/4/2017, post-op) = 9.8/27.1    > Hgb/Hct (4/19/2017) = 8.2/23.7   > Hgb/Hct (4/23/2017, on admission) = 8.0/23.3   > Anemia work-up showed serum iron 21, TIBC 196, iron % saturation 11, ferritin 779, reticulocyte count 3.7   > FeSO4 325 mg PO once daily with breakfast    > Ascorbic Acid 250 mg PO once daily with breakfast (to enhance the absorption of the FeSO4)     > Benign hypertensive heart disease with chronic systolic heart failure   > Diltiazem  mg PO once daily (6AM)   > Metoprolol tartrate 25 mg PO q 12 hr (9AM, 9PM)   > Losartan 25 mg PO once daily (12PM)    > Chronic atrial fibrillation, S/P Synchronized external cardioversion (4/18/2017 - Dr. Lissett Barcenas), anticoagulated on Coumadin   > Amiodarone 400 mg PO BID to complete 14 days (until 4/21/2017), then decrease to 200 mg PO BID afterwards   > Diltiazem  mg PO once daily (6AM)   > Metoprolol tartrate 25 mg PO q 12 hr (9AM, 9PM)   > Target INR = 2.5 to 3.5   > Patient received Coumadin 1 mg PO last night   > INR 2.7   > Coumadin 2 mg PO tonight    > Chronic ulcer of right foot   > Heel suspension boot on both feet when supine in bed   > Wound care recommendations as per Podiatry: Betadine dressings, non compressive, with 4x4s, conform once daily   > Ascorbic acid 250 mg PO once daily   > Zinc sulfate 220 mg PO once daily     > Hypothyroidism   > TSH (4/6/2017) = 4.77   > TSH (4/22/2017) = 5.08   > Free T4 (4/22/2017) = PENDING    > Levothyroxine 100 mcg PO once daily     > Hyperuricemia   > Uric acid (7/6/2016) = 9.8   > Uric acid (4/21/2017) = 2.9    > Vitamin D Deficiency   > Vitamin D 25-Hydroxy (4/22/2017) = 12.0   > Cholecalciferol 50,000 units PO x 1 dose today   > Cholecalciferol 5,000 units PO once daily (starting tomorrow)     > Analgesia   > Acetaminophen 650 mg PO q 4 hr PRN for pain level less than 5/10   > Duloxetine 60 mg PO once daily   > Gabapentin 100 mg PO BID   > Percocet 10/325 1 tab PO q 4 hr PRN for pain level greater than 4/10       PRECAUTIONS:   1. Safety/fall precautions. 2. Deep venous thrombosis precautions. POTENTIAL BARRIERS TO DISCHARGE:  1. Risk for falls. 2. The patient lives alone. 3. Family limited in ability to provide constant care.       RELEVANT CHANGES SINCE PREADMISSION SCREENING: I have compared the patients medical and functional status at the time of the pre-admission screening and on this post-admission evaluation. The preadmission screen and findings from therapy evaluations both support my post admission physician evaluation, deeming this patient to be an appropriate candidate for the IRF. The patient requires multidisciplinary treatment, physician oversight and intensive therapy not provided at a lower level of care. By signing this document, I acknowledge that I have personally performed a full physical examination on this patient within 24 hours of admission to this inpatient rehabilitation facility and have determined the patient to be able to tolerate the above course of treatment at an intensive level for a reasonable period of time. I will be completing a detailed individualized plan of care for this patient by day #4 of the patients stay based upon the Pre-Admission Screen, the Post-Admission Evaluation, and the therapy evaluations.       Signed:    Harshil Wright MD    April 22, 2017

## 2017-04-22 NOTE — PROGRESS NOTES
Problem: Self Care Deficits Care Plan (Adult)  Goal: *Acute Goals and Plan of Care (Insert Text)  Long Term Goals (to be met upon discharge date) in order to increase pts functional independence and safety, and decrease burden of care:  1. Pt will perform grooming with independence while standing. 2. Pt will perform UB bathing with modified independence. 3. Pt will perform LB bathing with modified independence. 4. Pt will perform tub/shower transfer with modified independence. 5. Pt will perform UB dressing with independence. 6. Pt will perform LB dressing with modified independence. 7. Pt will perform toileting task with modified independence. 8. Pt will perform toilet transfer with modified independence. Short Term Weekly Goals for (4/22/2017 to 4/29/2017) in order to increase pts functional independence and safety, and decrease burden of care:  1. Pt will perform grooming with independence while standing. 2. Pt will perform UB bathing with modified independence. 3. Pt will perform LB bathing with modified independence. 4. Pt will perform tub/shower transfer with supervision. 5. Pt will perform UB dressing with independence. 6. Pt will perform LB dressing with supervision. 7. Pt will perform toileting task with supervision. 8. Pt will perform toilet transfer with supervision. OCCUPATIONAL THERAPY EXAMINATION     Patient Name: Kellie Gonzalez  Patient Age: 59 y.o.   Past Medical History:   Past Medical History:   Diagnosis Date    Acute blood loss as cause of postoperative anemia 4/4/2017    Anticoagulated on Coumadin      Aortoiliac occlusive disease ([facility] [address] 1/25/2017    Atherosclerosis of native artery of both lower extremities with intermittent claudication ([address] 1/25/2017    Benign hypertensive heart disease with systolic congestive heart failure, NYHA class 2 ([address] 1/26/2015    Carotid artery disease (HCC)      Chronic atrial fibrillation (HCC)      Chronic systolic heart failure ([address]  Chronic ulcer of right foot ([facility] [address] 4/4/2017    Coronary artery disease involving native coronary artery of native heart 3/15/2017     Successful stenting of Cx (Xience LESLEY) and RCA (Xience LESLEY) to 0% by Dr. Felix Roman on 3/15/17.     DDD (degenerative disc disease), lumbar 1/26/2015    Dyslipidemia      Elevated TSH 4/6/2017    Erectile dysfunction 7/5/2016    Hereditary peripheral neuropathy 11/15/2016    History of cardioversion 4/18/2017     S/P Synchronized external cardioversion (4/18/2017 - Dr. Anne Nguyen)    Hyperuricemia      Ischemic cardiomyopathy      Peripheral artery disease ([facility] [address] 1/25/2017    Spinal stenosis of lumbar region with radiculopathy 5/4/2015     Dr. Juanita Francois Diagnosis:  Debility   Status post femoral-popliteal bypass surgery Status post femoral-popliteal bypass surgery   Therapy Diagnosis:   Difficulty with ADLs  [X]     Difficulty with functional transfers  [X]     Difficulty with ambulation  [X]     Difficulty with IADLs  [X]        Problem List:    Decreased strength B UE  [X]     Decreased strength trunk/core  [X]     Decreased AROM   [ ]     Decreased endurance  [X]     Decreased balance sitting  [ ]     Decreased balance standing  [X]     Pain   [X]     Decreased PROM  [ ]        Functional Limitations:   Decreased independence with ADL  [X]     Decreased independence with functional transfers  [X]     Decreased independence with ambulation  [X]     Decreased independence with IADL  [X]        Previous Functional Level:       Home Environment: Home Situation  Home Environment: Private residence  # Steps to Enter: 3  One/Two Story Residence: One story  Living Alone: Yes  Support Systems: Child(mile)  Patient Expects to be Discharged to[de-identified] Private residence  Current DME Used/Available at Home: None     Precautions: Fall     Time In: 0800  Time Out: 0900     Time In: 1120  Time Out: 1150     Pain at start of tx: Pt reports 5-6/10 pain in R foot throughout treatment. Pain at stop of tx: Pt reports 5-6/10 pain in R foot throughout treatment. Patient identified with name and : yes  Objective: (5851-1820) Pt was seen for initial OT evaluation. He performed full body shower; see below for ADL and functional transfer levels. ([id number]) Pt participated in sit <> stand transfer training to increase pt's independence, safety, and carryover to functional transfers. He performed x10 sit <> stand transfers to/from RW, initially requiring CGA and max cues for implementing proper hand placement. After completing 4 sit to stand transfers from w/c, pt requested to perform short distance ambulation 5-10 feet upon each stand. He ambulated to and from mat table and required increased cueing for hand placement when sitting down. Pt able to progress to a SBA level within the session with minimal cues needed for hand placement.         Outcome Measures:                  MMT Initial Assessment    Right Upper Extremity  Left Upper Extremity    UE AROM  WFL; Grossly 4+/5  WFL; Grossly 4+/5   Shoulder flexion  -  -   Shoulder extension  -  -   Shoulder ABDuction  -  -   Shoulder ADDUction  -  -   Elbow Flexion  -  -   Elbow Extension  -  -   Wrist Extension/Flexion  -  -     -  -   0/5       No palpable muscle contraction  1/5       Palpable muscle contraction, no joint movement  2-/5      Less than full range of motion in gravity eliminated position  2/5       Able to complete full range of motion in gravity eliminated position  2+/5     Able to initiate movement against gravity  3-/5      More than half but not full range of motion against gravity  3/5       Able to complete full range of motion against gravity  3+/5     Completes full range of motion against gravity with minimal resistance  4-/5      Completes full range of motion against gravity with minimal-moderate resistance  4/5       Completes full range of motion against gravity with moderate resistance  4+/5 Completes full range of motion against gravity with moderate-maximum resistance  5/5       Completes full range of motion against gravity with maximum resistance     Sensation: Intact UEs  Coordination: Intact UEs      FIM SCORES Initial Assessment   Bladder - level of assist -   Bladder - accident frequency score -   Bowel - level of assist -   Bowel - accident frequency score -   Please see IRC Interdisciplinary Eval: Coordination/Balance Section for details regarding FIM score description. COGNITION/PERCEPTION Initial Assessment   Premorbid Reading Status  -   Premorbid Writing Status  -   Arousal/Alertness Generalized responses   Orientation Level Oriented X4   Visual Fields  (Intact)   Praxis Intact (UEs)   Body Scheme Appears intact       COMPREHENSION MODE Initial Assessment   Primary Mode of Comprehension Auditory   Hearing Aide     Corrective Lenses Reading glasses   Score 5       EXPRESSION Initial Assessment   Primary Mode of Expression Verbal   Score 5   Comments         SOCIAL INTERACTION/PRAGMATICS Initial Assessment   Score 6   Comments         PROBLEM SOLVING Initial Assessment   Score 4   Comments         MEMORY Initial Assessment   Score 5   Comments         EATING Initial Assessment   Functional Level 7   Comments         GROOMING Initial Assessment   Functional Level 7     Oral Hygiene FIM: 7   Tasks completed by patient Brushed teeth, Brushed hair, Washed face   Comments Pt independent with grooming tasks while standing at the sink with close supervisoin for safety with standing only. BATHING Initial Assessment   Functional Level 2 (Pt stood in shower at [facility])   Body parts patient bathed Abdomen, Arm, left, Arm, right, Buttocks, Chest, Lower leg and foot, left, Lower leg and foot, right, Joanie area, Thigh, left, Thigh, right   Comments Pt performed full body bathing while seated on shower bench with setup of items and supervision needed for safety while weightshifting to wash buttocks. Min A also required for thoroughly washing R foot 2/2 pt has difficulty with reaching his foot. TUB/SHOWER TRANSFER INDEPENDENCE Initial Assessment   Score 4   Comments Pt performed stand step transfer to/from tub bench utilizing RW with CGA for safety and verbal cues for proper hand placement on chair and walker during sit <> stand transitions. UPPER BODY DRESSING/UNDRESSING Initial Assessment   Functional Level 5   Items applied/Steps completed Pullover (4 steps)   Comments Setup only       LOWER BODY DRESSING/UNDRESSING Initial Assessment   Functional Level 4     Sock and/or Shoe Management FIM: 4   Items applied/Steps completed Elastic waist pants (3 steps), Sock, left (1 step), Sock, right (1 step)   Comments Pt is able to thread LEs into shorts while seated and stands with close supervision to manage them over buttocks. He was able to cross LEs to manage socks however did require Min A for donning R sock completely due to edema in R foot, tighter fit of the sock, and increased pain due to wounds on his foot. TOILETING Initial Assessment   Functional Level 4   Comments Based on performance of LB bathing and dressing tasks, pt able to perform clothing management and hygiene with close supervision for safety. TOILET TRANSFER INDEPENDENCE Initial Assessment   Transfer score 4   Comments Based on other functional transfers and functional moblity within the room, pt able to perform stand step transfer to/from [facility] utilizing RW with CGA for safety and verbal cues to implement proper hand placement during sit <> stand transitions.        INSTRUMENTAL ADL Initial Assessment (PLOF)   Meal preparation Independent   Homemaking Independent   Medicine Management Independent   Financial Management Independent      OCCUPATIONAL THERAPY PLAN OF CARE  Areas to Assess:   Self Care/Functional transfer/IADL  [X]     NMRE/ estim  [ ]     UE Ther ex/Ther act  [X]     Cognitive Training  [X] Patient/Family/Caregiver Education  [X]       Order received from MD for occupational therapy services and chart reviewed. Pt to be seen 5 times per week for 3 hours of total therapy per day for 1-2 weeks. Thank you for the referral.     LTGs: See Care Plan     Pt would benefit from skilled occupational therapy in order to improve independent functional mobility/ADLs,/IADLs within the home. Interventions may include range of motion (AROM, PROM B UE), motor function (B UE/ strengthening/coordination), activity tolerance (vitals, oxygen saturation levels), balance training, ADL/IADL training and functional transfer training. Please see IRC; Interdisciplinary Eval, Care Plan, and Patient Education for further information regarding occupational therapy examination and plan of care.       Lissett Marcos, OTR  4/22/2017

## 2017-04-22 NOTE — PROGRESS NOTES
[facility] Report called to Gabo Mcrae nurse in rehab unit. Patient remains alert and oriented x3 Sitting up in chair at bedside. IV lock and telemetry discontinued.

## 2017-04-22 NOTE — PROGRESS NOTES
SHIFT CHANGE NOTE FOR [facility]    Bedside and Verbal shift change report given to  Jim Wallace RN oncoming nurse) by Marisol Ye RN   (offgoing nurse). Report included the following information SBAR, Kardex, MAR and Recent Results.     Situation:   Code Status: Full Code   Reason for Admission: [address] Day: 1   Problem List:   Hospital Problems  Date Reviewed: 4/21/2017          Codes Class Noted POA    Vitamin D deficiency (Chronic) ICD-10-CM: E55.9  ICD-9-CM: 268.9  4/22/2017 Yes    Overview Signed 4/22/2017 12:56 PM by Funmi Titus MD     Vitamin D 25-Hydroxy (4/22/2017) = 12.0             Anticoagulated on Coumadin (Chronic) ICD-10-CM: Z51.81, Z79.01  ICD-9-CM: V58.83, V58.61  Unknown Yes        Hyperuricemia (Chronic) ICD-10-CM: E79.0  ICD-9-CM: 790.6  Unknown Yes        Aftercare following surgery of the circulatory system ICD-10-CM: W76.576  ICD-9-CM: V58.73  4/21/2017 Yes    Overview Signed 4/21/2017  2:32 PM by Funmi Titus MD     S/P Right axillary to bifemoral artery bypass using an 8 mm Propaten graft from the right axilla to the right common femoral artery with a jump femoral-femoral bypass with another 8 mm Propaten external ring bypass; Right common femoral artery, and profunda femoris artery and superficial femoral artery endarterectomy causing an extensive surgery on the right side (4/4/2017 - Dr. Catherine Vaz)    S/P Cutdown of femoral-femoral bypass, more on the left groin side; Right lower extremity angiography with first-order catheterization (4/7/2017 - Dr. Catherine Vaz)    S/P Right femoral to above-knee popliteal bypass with an 8 mm PTFE graft (4/10/2017 - Dr. Avril Limon)             * (Principal)Status post femoral-popliteal bypass surgery ICD-10-CM: Z95.828  ICD-9-CM: V45.89  4/21/2017 Yes    Overview Signed 4/21/2017  2:33 PM by Funmi Titus MD     S/P Right axillary to bifemoral artery bypass using an 8 mm Propaten graft from the right axilla to the right common femoral artery with a jump femoral-femoral bypass with another 8 mm Propaten external ring bypass; Right common femoral artery, and profunda femoris artery and superficial femoral artery endarterectomy causing an extensive surgery on the right side (4/4/2017 - Dr. Anu Mosley)    S/P Cutdown of femoral-femoral bypass, more on the left groin side; Right lower extremity angiography with first-order catheterization (4/7/2017 - Dr. Anu Mosley)    S/P Right femoral to above-knee popliteal bypass with an 8 mm PTFE graft (4/10/2017 - Dr. Land)             History of cardioversion ICD-10-CM: Z98.890  ICD-9-CM: V15.1  4/18/2017 Yes    Overview Signed 4/21/2017  2:34 PM by Rajani Sloan MD     S/P Synchronized external cardioversion (4/18/2017 - Dr. Katya Vo)             Chronic atrial fibrillation ([facility] (Chronic) ICD-10-CM: A29.4  ICD-9-CM: 427.31  Unknown Yes        Critical ischemia of lower extremity ICD-10-CM: I99.8  ICD-9-CM: 459.9  4/10/2017 No    Overview Signed 4/21/2017  4:41 PM by Rajani Sloan MD     Right lower limb             Hypothyroidism (Chronic) ICD-10-CM: E03.9  ICD-9-CM: 244.9  4/6/2017 Yes        Acute blood loss as cause of postoperative anemia ICD-10-CM: D62  ICD-9-CM: 285.1  4/4/2017 Yes        Impaired mobility and ADLs ICD-10-CM: Z74.09  ICD-9-CM: 799.89  4/4/2017 Yes        Chronic ulcer of right foot ([facility] (Chronic) ICD-10-CM: L97.519  ICD-9-CM: 707.15  4/4/2017 Yes        Aortoiliac occlusive disease ([facility] (Chronic) ICD-10-CM: C31.39  ICD-9-CM: 444.09  1/25/2017 Yes        Atherosclerosis of native artery of both lower extremities with intermittent claudication (HCC) (Chronic) ICD-10-CM: I25.816  ICD-9-CM: 440.21  1/25/2017 Yes        Peripheral artery disease (HCC) (Chronic) ICD-10-CM: I73.9  ICD-9-CM: 443.9  1/25/2017 Yes        Benign hypertensive heart disease with systolic congestive heart failure, NYHA class 2 (HCC) (Chronic) ICD-10-CM: I11.0, I50.20  ICD-9-CM: 402.11, 428.20, 428.0  1/26/2015 Yes              Background:   Past Medical History:   Past Medical History:   Diagnosis Date    Acute blood loss as cause of postoperative anemia 4/4/2017    Anticoagulated on Coumadin     Aortoiliac occlusive disease ([facility] 1/25/2017    Atherosclerosis of native artery of both lower extremities with intermittent claudication ([facility] 1/25/2017    Benign hypertensive heart disease with systolic congestive heart failure, NYHA class 2 ([facility] 1/26/2015    Carotid artery disease (HCC)     Chronic atrial fibrillation (HCC)     Chronic systolic heart failure (HCC)     Chronic ulcer of right foot ([facility] 4/4/2017    Coronary artery disease involving native coronary artery of native heart 3/15/2017    Successful stenting of Cx (Xience LESLEY) and RCA (Xience LESLEY) to 0% by Dr. Imelda De La Cruz on 3/15/17.     DDD (degenerative disc disease), lumbar 1/26/2015    Dyslipidemia     Elevated TSH 4/6/2017    Erectile dysfunction 7/5/2016    Hereditary peripheral neuropathy 11/15/2016    History of cardioversion 4/18/2017    S/P Synchronized external cardioversion (4/18/2017 - Dr. Ariane Curiel)    Hyperuricemia     Hypothyroidism 4/6/2017    Ischemic cardiomyopathy     Peripheral artery disease ([facility] 1/25/2017    Spinal stenosis of lumbar region with radiculopathy 5/4/2015    Dr. Marlon Weston Vitamin D deficiency 4/22/2017    Vitamin D 25-Hydroxy (4/22/2017) = 12.0      Patient taking anticoagulants YES   Patient has a defibrillator: no     Assessment:   Changes in Assessment throughout shift: NONE                     Last Vitals:     Vitals:    04/22/17 0600 04/22/17 0851 04/22/17 1244 04/22/17 1742   BP: 103/58 136/61 122/60 120/69   Pulse: 82 87  89   Resp:  18  18   Temp:  98 °F (36.7 °C)  98 °F (36.7 °C)   SpO2:  100%  100%        PAIN    Pain Assessment    Pain Intensity 1: 0 (04/22/17 1600) Pain Intensity 1: 2 (12/29/14 1105)    Pain Location 1: Foot Pain Location 1: Abdomen    Pain Intervention(s) 1: Medication (see MAR) Pain Intervention(s) 1: Medication (see MAR)  Patient Stated Pain Goal: 0 Patient Stated Pain Goal: 0  o Intervention effective: YES   o Other actions taken for pain: MEDICATION     Skin Assessment  Skin color Skin Color: Appropriate for ethnicity  Condition/Temperature Skin Condition/Temp: Dry, Warm  Integrity Skin Integrity: Incision (comment)  Turgor Turgor: Non-tenting  Weekly Pressure Ulcer Documentation Weekly Pressure Ulcer Documentation: No Pressure Ulcer Noted-Pressure Ulcer Prevention Initiated  Wound Prevention & Protection Methods  Orientation of wound Orientation of Wound Prevention: Posterior  Location of Prevention Location of Wound Prevention: Sacrum/Coccyx  Dressing Present Dressing Present : Yes  Dressing Status Dressing Status: Intact  Wound Offloading Wound Offloading (Prevention Methods): Bed, pressure redistribution/air     INTAKE/OUPUT    Date 04/21/17 1900 - 04/22/17 0659 04/22/17 0700 - 04/23/17 0659   Shift 0303-6456 24 Hour Total 6080-3555 7894-4726 24 Hour Total   I  N  T  A  K  E   P.O.   440  440      P. O.   440  440    Shift Total   440  440   O  U  T  P  U  T   Urine 1400 1400         Urine Voided 1400 1400         Urine Occurrence(s) 5 x 5 x 2 x  2 x    Stool           Stool Occurrence(s) 1 x 1 x 0 x  0 x    Shift Total 1400 1400      NET -1400 -1400 440  440   Weight (kg)            Recommendations:  1. Patient needs and requests: TOILETING    2. Diet: CARDIAC REG    3. Pending tests/procedures: LABS    4. Functional Level/Equipment: wheelchair    5. Estimated Discharge Date: TBD Posted on Whiteboard in Patients Room: no     hospitals Safety Check    A safety check occurred in the patient's room between off going nurse and oncoming nurse listed above.     The safety check included the below items  Area Items   H  High Alert Medications - Verify all high alert medication drips (heparin, PCA, etc.)   E  Equipment - Suction is set up for ALL patients (with cailin)  - Red plugs utilized for all equipment (IV pumps, etc.)  - WOWs wiped down at end of shift.  - Room stocked with oxygen, suction, and other unit-specific supplies   A  Alarms - Bed alarm is set for fall risk patients  - Ensure chair alarm is in place and activated if patient is up in a chair   L  Lines - Check IV for any infiltration  - Jiang bag is empty if patient has a Jiang   - Tubing and IV bags are labeled   S  Safety   - Room is clean, patient is clean, and equipment is clean. - Hallways are clear from equipment besides carts. - Fall bracelet on for fall risk patients  - Ensure room is clear and free of clutter  - Suction is set up for ALL patients (with cailin)  - Hallways are clear from equipment besides carts.    - Isolation precautions followed, supplies available outside room, sign posted

## 2017-04-22 NOTE — PROGRESS NOTES
SHIFT CHANGE NOTE FOR [facility]    Bedside and Verbal shift change report given to  Brad Anguiano RN oncoming nurse) by Katie Mac RN   (offgoing nurse). Report included the following information SBAR, Kardex, MAR and Recent Results.     Situation:   Code Status: Full Code   Reason for Admission: [address] Day: 1   Problem List:   Hospital Problems  Date Reviewed: 4/21/2017          Codes Class Noted POA    Anticoagulated on Coumadin (Chronic) ICD-10-CM: Z51.81, Z79.01  ICD-9-CM: V58.83, V58.61  Unknown Yes        Hyperuricemia (Chronic) ICD-10-CM: E79.0  ICD-9-CM: 790.6  Unknown Yes        Aftercare following surgery of the circulatory system ICD-10-CM: D75.084  ICD-9-CM: V58.73  4/21/2017 Yes    Overview Signed 4/21/2017  2:32 PM by Jennie Thompson MD     S/P Right axillary to bifemoral artery bypass using an 8 mm Propaten graft from the right axilla to the right common femoral artery with a jump femoral-femoral bypass with another 8 mm Propaten external ring bypass; Right common femoral artery, and profunda femoris artery and superficial femoral artery endarterectomy causing an extensive surgery on the right side (4/4/2017 - Dr. Fanta High)    S/P Cutdown of femoral-femoral bypass, more on the left groin side; Right lower extremity angiography with first-order catheterization (4/7/2017 - Dr. Fanta High)    S/P Right femoral to above-knee popliteal bypass with an 8 mm PTFE graft (4/10/2017 - Dr. Pedro Dietz)             * (Principal)Status post femoral-popliteal bypass surgery ICD-10-CM: Z95.828  ICD-9-CM: V45.89  4/21/2017 Yes    Overview Signed 4/21/2017  2:33 PM by Jennie Thompson MD     S/P Right axillary to bifemoral artery bypass using an 8 mm Propaten graft from the right axilla to the right common femoral artery with a jump femoral-femoral bypass with another 8 mm Propaten external ring bypass; Right common femoral artery, and profunda femoris artery and superficial femoral artery endarterectomy causing an extensive surgery on the right side (4/4/2017 - Dr. Jono Ruiz)    S/P Cutdown of femoral-femoral bypass, more on the left groin side; Right lower extremity angiography with first-order catheterization (4/7/2017 - Dr. Jono Ruiz)    S/P Right femoral to above-knee popliteal bypass with an 8 mm PTFE graft (4/10/2017 - Dr. Vamshi Aly)             History of cardioversion ICD-10-CM: Z98.890  ICD-9-CM: V15.1  4/18/2017 Yes    Overview Signed 4/21/2017  2:34 PM by Hayden Carter MD     S/P Synchronized external cardioversion (4/18/2017 - Dr. Lobo Murrieta)             Chronic atrial fibrillation ([facility] (Chronic) ICD-10-CM: D24.6  ICD-9-CM: 427.31  Unknown Yes        Critical ischemia of lower extremity ICD-10-CM: I99.8  ICD-9-CM: 459.9  4/10/2017 No    Overview Signed 4/21/2017  4:41 PM by Hayden Carter MD     Right lower limb             Elevated TSH ICD-10-CM: R94.6  ICD-9-CM: 794.5  4/6/2017 Yes        Acute blood loss as cause of postoperative anemia ICD-10-CM: D62  ICD-9-CM: 285.1  4/4/2017 Yes        Impaired mobility and ADLs ICD-10-CM: Z74.09  ICD-9-CM: 799.89  4/4/2017 Yes        Chronic ulcer of right foot (HCC) (Chronic) ICD-10-CM: L97.519  ICD-9-CM: 707.15  4/4/2017 Yes        Aortoiliac occlusive disease ([facility] (Chronic) ICD-10-CM: Y95.16  ICD-9-CM: 444.09  1/25/2017 Yes        Atherosclerosis of native artery of both lower extremities with intermittent claudication (HCC) (Chronic) ICD-10-CM: I15.756  ICD-9-CM: 440.21  1/25/2017 Yes        Peripheral artery disease (HCC) (Chronic) ICD-10-CM: I73.9  ICD-9-CM: 443.9  1/25/2017 Yes        Benign hypertensive heart disease with systolic congestive heart failure, NYHA class 2 (HCC) (Chronic) ICD-10-CM: I11.0, I50.20  ICD-9-CM: 402.11, 428.20, 428.0  1/26/2015 Yes              Background:   Past Medical History:   Past Medical History:   Diagnosis Date    Acute blood loss as cause of postoperative anemia 4/4/2017    Anticoagulated on Coumadin     Aortoiliac occlusive disease (HCC) 1/25/2017    Atherosclerosis of native artery of both lower extremities with intermittent claudication ([facility] 1/25/2017    Benign hypertensive heart disease with systolic congestive heart failure, NYHA class 2 ([facility] 1/26/2015    Carotid artery disease (HCC)     Chronic atrial fibrillation (HCC)     Chronic systolic heart failure (HCC)     Chronic ulcer of right foot ([facility] 4/4/2017    Coronary artery disease involving native coronary artery of native heart 3/15/2017    Successful stenting of Cx (Xience LESLEY) and RCA (Xience LESLEY) to 0% by Dr. Timur Ayala on 3/15/17.     DDD (degenerative disc disease), lumbar 1/26/2015    Dyslipidemia     Elevated TSH 4/6/2017    Erectile dysfunction 7/5/2016    Hereditary peripheral neuropathy 11/15/2016    History of cardioversion 4/18/2017    S/P Synchronized external cardioversion (4/18/2017 - Dr. Bee Stout)    Hyperuricemia     Ischemic cardiomyopathy     Peripheral artery disease ([facility] 1/25/2017    Spinal stenosis of lumbar region with radiculopathy 5/4/2015    Dr. Faizan Goode      Patient taking anticoagulants YES   Patient has a defibrillator: no     Assessment:   Changes in Assessment throughout shift: NONE                     Last Vitals:     Vitals:    04/21/17 1800 04/21/17 1935 04/21/17 2042 04/22/17 0600   BP: 127/67 125/63 125/63 103/58   Pulse: 100 90 90 82   Resp: 20 20     Temp: 98.5 °F (36.9 °C) 99.1 °F (37.3 °C)     SpO2: 100% 99%          PAIN    Pain Assessment    Pain Intensity 1: 4 (04/22/17 0650) Pain Intensity 1: 2 (12/29/14 1105)    Pain Location 1: Foot Pain Location 1: Abdomen    Pain Intervention(s) 1: Medication (see MAR) Pain Intervention(s) 1: Medication (see MAR)  Patient Stated Pain Goal: 0 Patient Stated Pain Goal: 0  o Intervention effective: YES   o Other actions taken for pain: MEDICATION     Skin Assessment  Skin color Skin Color: Appropriate for ethnicity  Condition/Temperature Skin Condition/Temp: Dry, Warm  Integrity Skin Integrity: Incision (comment)  Turgor Turgor: Non-tenting  Weekly Pressure Ulcer Documentation Weekly Pressure Ulcer Documentation: No Pressure Ulcer Noted-Pressure Ulcer Prevention Initiated  Wound Prevention & Protection Methods  Orientation of wound Orientation of Wound Prevention: Posterior  Location of Prevention Location of Wound Prevention: Sacrum/Coccyx  Dressing Present Dressing Present : Yes  Dressing Status Dressing Status: Intact  Wound Offloading Wound Offloading (Prevention Methods): Bed, pressure redistribution/air     INTAKE/OUPUT    Date 04/21/17 0700 - 04/22/17 0659 04/22/17 0700 - 04/23/17 0659   Shift 1003-2480 5477-0757 24 Hour Total 0786-8917 2862-9029 24 Hour Total   I  N  T  A  K  E   Shift Total         O  U  T  P  U  T   Urine  1400 1400         Urine Voided  1400 1400         Urine Occurrence(s) 0 x 5 x 5 x       Stool            Stool Occurrence(s) 0 x 1 x 1 x       Shift Total  1400 1400      NET  -1400 -1400      Weight (kg)             Recommendations:  1. Patient needs and requests: TOILETING    2. Diet: CARDIAC REG    3. Pending tests/procedures: LABS    4. Functional Level/Equipment: wheelchair    5. Estimated Discharge Date: TBD Posted on Whiteboard in Patients Room: no     [facility] Safety Check    A safety check occurred in the patient's room between off going nurse and oncoming nurse listed above.     The safety check included the below items  Area Items   H  High Alert Medications - Verify all high alert medication drips (heparin, PCA, etc.)   E  Equipment - Suction is set up for ALL patients (with yanker)  - Red plugs utilized for all equipment (IV pumps, etc.)  - WOWs wiped down at end of shift.  - Room stocked with oxygen, suction, and other unit-specific supplies   A  Alarms - Bed alarm is set for fall risk patients  - Ensure chair alarm is in place and activated if patient is up in a chair   L  Lines - Check IV for any infiltration  - Jiang bag is empty if patient has a Jiang   - Tubing and IV bags are labeled   S  Safety   - Room is clean, patient is clean, and equipment is clean. - Hallways are clear from equipment besides carts. - Fall bracelet on for fall risk patients  - Ensure room is clear and free of clutter  - Suction is set up for ALL patients (with yanker)  - Hallways are clear from equipment besides carts.    - Isolation precautions followed, supplies available outside room, sign posted

## 2017-04-23 PROBLEM — E07.81 EUTHYROID SICK SYNDROME: Status: ACTIVE | Noted: 2017-04-06

## 2017-04-23 LAB
INR PPP: 2.1 (ref 0.8–1.2)
PROTHROMBIN TIME: 22.5 SEC (ref 11.5–15.2)

## 2017-04-23 PROCEDURE — 36415 COLL VENOUS BLD VENIPUNCTURE: CPT | Performed by: INTERNAL MEDICINE

## 2017-04-23 PROCEDURE — 74011250637 HC RX REV CODE- 250/637: Performed by: INTERNAL MEDICINE

## 2017-04-23 PROCEDURE — 85610 PROTHROMBIN TIME: CPT | Performed by: INTERNAL MEDICINE

## 2017-04-23 PROCEDURE — 65310000000 HC RM PRIVATE REHAB

## 2017-04-23 RX ORDER — WARFARIN 3 MG/1
3 TABLET ORAL
Status: COMPLETED | OUTPATIENT
Start: 2017-04-23 | End: 2017-04-23

## 2017-04-23 RX ORDER — LEVOTHYROXINE SODIUM 50 UG/1
50 TABLET ORAL
Status: COMPLETED | OUTPATIENT
Start: 2017-04-24 | End: 2017-04-25

## 2017-04-23 RX ORDER — LEVOTHYROXINE SODIUM 25 UG/1
25 TABLET ORAL
Status: COMPLETED | OUTPATIENT
Start: 2017-04-26 | End: 2017-04-27

## 2017-04-23 RX ADMIN — AMIODARONE HYDROCHLORIDE 400 MG: 200 TABLET ORAL at 18:19

## 2017-04-23 RX ADMIN — OXYCODONE HYDROCHLORIDE AND ACETAMINOPHEN 1 TABLET: 10; 325 TABLET ORAL at 05:43

## 2017-04-23 RX ADMIN — OXYCODONE HYDROCHLORIDE AND ACETAMINOPHEN 1 TABLET: 10; 325 TABLET ORAL at 01:51

## 2017-04-23 RX ADMIN — DILTIAZEM HYDROCHLORIDE 120 MG: 120 CAPSULE, COATED, EXTENDED RELEASE ORAL at 05:43

## 2017-04-23 RX ADMIN — GABAPENTIN 100 MG: 100 CAPSULE ORAL at 18:19

## 2017-04-23 RX ADMIN — WARFARIN SODIUM 3 MG: 3 TABLET ORAL at 13:05

## 2017-04-23 RX ADMIN — OXYCODONE HYDROCHLORIDE AND ACETAMINOPHEN 1 TABLET: 10; 325 TABLET ORAL at 15:22

## 2017-04-23 RX ADMIN — Medication 250 MG: at 09:23

## 2017-04-23 RX ADMIN — LEVOTHYROXINE SODIUM 100 MCG: 100 TABLET ORAL at 05:44

## 2017-04-23 RX ADMIN — Medication 5000 UNITS: at 09:28

## 2017-04-23 RX ADMIN — METOPROLOL TARTRATE 25 MG: 25 TABLET ORAL at 09:23

## 2017-04-23 RX ADMIN — DOCUSATE SODIUM 100 MG: 100 CAPSULE, LIQUID FILLED ORAL at 18:19

## 2017-04-23 RX ADMIN — LACTOBACILLUS TAB 2 TABLET: TAB at 18:19

## 2017-04-23 RX ADMIN — OXYCODONE HYDROCHLORIDE AND ACETAMINOPHEN 1 TABLET: 10; 325 TABLET ORAL at 19:56

## 2017-04-23 RX ADMIN — AMIODARONE HYDROCHLORIDE 400 MG: 200 TABLET ORAL at 09:24

## 2017-04-23 RX ADMIN — DULOXETINE HYDROCHLORIDE 60 MG: 60 CAPSULE, DELAYED RELEASE ORAL at 09:23

## 2017-04-23 RX ADMIN — METOPROLOL TARTRATE 25 MG: 25 TABLET ORAL at 22:27

## 2017-04-23 RX ADMIN — CLOPIDOGREL BISULFATE 75 MG: 75 TABLET, FILM COATED ORAL at 18:19

## 2017-04-23 RX ADMIN — ATORVASTATIN CALCIUM 40 MG: 40 TABLET, FILM COATED ORAL at 22:27

## 2017-04-23 RX ADMIN — LACTOBACILLUS TAB 2 TABLET: TAB at 09:24

## 2017-04-23 RX ADMIN — ZINC SULFATE 220 MG (50 MG) CAPSULE 220 MG: CAPSULE at 09:24

## 2017-04-23 RX ADMIN — FERROUS SULFATE TAB 325 MG (65 MG ELEMENTAL FE) 325 MG: 325 (65 FE) TAB at 09:23

## 2017-04-23 RX ADMIN — GABAPENTIN 100 MG: 100 CAPSULE ORAL at 09:24

## 2017-04-23 RX ADMIN — OXYCODONE HYDROCHLORIDE AND ACETAMINOPHEN 1 TABLET: 10; 325 TABLET ORAL at 09:28

## 2017-04-23 RX ADMIN — ASPIRIN 81 MG CHEWABLE TABLET 81 MG: 81 TABLET CHEWABLE at 09:23

## 2017-04-23 NOTE — PROGRESS NOTES
[facility]    Bedside and Verbal shift change report given to  Oleg Quintero RN oncoming nurse) by Luther Wells LPN   (offgoing nurse). Report included the following information SBAR, Kardex, MAR and Recent Results.     Situation:   Code Status: Full Code   Reason for Admission: [address] Day: 2   Problem List:   Hospital Problems  Date Reviewed: 4/23/2017          Codes Class Noted POA    Vitamin D deficiency (Chronic) ICD-10-CM: E55.9  ICD-9-CM: 268.9  4/22/2017 Yes    Overview Signed 4/22/2017 12:56 PM by Pearlean Felty, MD     Vitamin D 25-Hydroxy (4/22/2017) = 12.0             Anticoagulated on Coumadin (Chronic) ICD-10-CM: Z51.81, Z79.01  ICD-9-CM: V58.83, V58.61  Unknown Yes        Hyperuricemia (Chronic) ICD-10-CM: E79.0  ICD-9-CM: 790.6  Unknown Yes        Aftercare following surgery of the circulatory system ICD-10-CM: W81.008  ICD-9-CM: V58.73  4/21/2017 Yes    Overview Signed 4/21/2017  2:32 PM by Pearlean Felty, MD     S/P Right axillary to bifemoral artery bypass using an 8 mm Propaten graft from the right axilla to the right common femoral artery with a jump femoral-femoral bypass with another 8 mm Propaten external ring bypass; Right common femoral artery, and profunda femoris artery and superficial femoral artery endarterectomy causing an extensive surgery on the right side (4/4/2017 - Dr. Tres Jang)    S/P Cutdown of femoral-femoral bypass, more on the left groin side; Right lower extremity angiography with first-order catheterization (4/7/2017 - Dr. Tres Jang)    S/P Right femoral to above-knee popliteal bypass with an 8 mm PTFE graft (4/10/2017 - Dr. Gabi Clements)             * (Principal)Status post femoral-popliteal bypass surgery ICD-10-CM: Z95.828  ICD-9-CM: V45.89  4/21/2017 Yes    Overview Signed 4/21/2017  2:33 PM by Pearlean Felty, MD     S/P Right axillary to bifemoral artery bypass using an 8 mm Propaten graft from the right axilla to the right common femoral artery with a jump femoral-femoral bypass with another 8 mm Propaten external ring bypass; Right common femoral artery, and profunda femoris artery and superficial femoral artery endarterectomy causing an extensive surgery on the right side (4/4/2017 - Dr. Malu Chahal)    S/P Cutdown of femoral-femoral bypass, more on the left groin side; Right lower extremity angiography with first-order catheterization (4/7/2017 - Dr. Malu Chahal)    S/P Right femoral to above-knee popliteal bypass with an 8 mm PTFE graft (4/10/2017 - Dr. Ivanna Chapin)             History of cardioversion ICD-10-CM: Z98.890  ICD-9-CM: V15.1  4/18/2017 Yes    Overview Signed 4/21/2017  2:34 PM by Yoseph Montano MD     S/P Synchronized external cardioversion (4/18/2017 - Dr. Michelle Winter)             Chronic atrial fibrillation ([facility] (Chronic) ICD-10-CM: B29.6  ICD-9-CM: 427.31  Unknown Yes        Critical ischemia of lower extremity ICD-10-CM: I99.8  ICD-9-CM: 459.9  4/10/2017 No    Overview Signed 4/21/2017  4:41 PM by Yoseph Montano MD     Right lower limb             Euthyroid sick syndrome ICD-10-CM: E07.81  ICD-9-CM: 790.94  4/6/2017 Yes        Acute blood loss as cause of postoperative anemia ICD-10-CM: D62  ICD-9-CM: 285.1  4/4/2017 Yes        Impaired mobility and ADLs ICD-10-CM: Z74.09  ICD-9-CM: 799.89  4/4/2017 Yes        Chronic ulcer of right foot (HCC) (Chronic) ICD-10-CM: L97.519  ICD-9-CM: 707.15  4/4/2017 Yes        Aortoiliac occlusive disease ([facility] (Chronic) ICD-10-CM: P75.92  ICD-9-CM: 444.09  1/25/2017 Yes        Atherosclerosis of native artery of both lower extremities with intermittent claudication (HCC) (Chronic) ICD-10-CM: H47.341  ICD-9-CM: 440.21  1/25/2017 Yes        Peripheral artery disease (HCC) (Chronic) ICD-10-CM: I73.9  ICD-9-CM: 443.9  1/25/2017 Yes        Benign hypertensive heart disease with systolic congestive heart failure, NYHA class 2 (HCC) (Chronic) ICD-10-CM: I11.0, I50.20  ICD-9-CM: 402.11, 428.20, 428.0  1/26/2015 Yes              Background:   Past Medical History:   Past Medical History:   Diagnosis Date    Acute blood loss as cause of postoperative anemia 4/4/2017    Anticoagulated on Coumadin     [facility] 1/25/2017    Atherosclerosis of native artery of both lower extremities with intermittent claudication ([facility] 1/25/2017    Benign hypertensive heart disease with systolic congestive heart failure, NYHA class 2 ([facility] 1/26/2015    Carotid artery disease (HCC)     Chronic atrial fibrillation (HCC)     Chronic systolic heart failure (HCC)     Chronic ulcer of right foot ([facility] 4/4/2017    Coronary artery disease involving native coronary artery of native heart 3/15/2017    Successful stenting of Cx (Xience LESLEY) and RCA (Xience LESLEY) to 0% by Dr. Lizeth Pinto on 3/15/17.     DDD (degenerative disc disease), lumbar 1/26/2015    Dyslipidemia     Erectile dysfunction 7/5/2016    Euthyroid sick syndrome 4/6/2017    Hereditary peripheral neuropathy 11/15/2016    History of cardioversion 4/18/2017    S/P Synchronized external cardioversion (4/18/2017 - Dr. Shiraz Ramey)    Hyperuricemia     Ischemic cardiomyopathy     Peripheral artery disease ([facility] 1/25/2017    Spinal stenosis of lumbar region with radiculopathy 5/4/2015    Dr. Marie Gamino Vitamin D deficiency 4/22/2017    Vitamin D 25-Hydroxy (4/22/2017) = 12.0      Patient taking anticoagulants YES   Patient has a defibrillator: no     Assessment:   Changes in Assessment throughout shift: NONE                     Last Vitals:     Vitals:    04/22/17 1936 04/23/17 0543 04/23/17 0909 04/23/17 1230   BP: 106/65 122/60 124/72 103/58   Pulse: 94 88 69 68   Resp: 18  18    Temp: 97.8 °F (36.6 °C)  97.3 °F (36.3 °C)    SpO2: 98%  100%         PAIN    Pain Assessment    Pain Intensity 1: 6 (04/23/17 1522) Pain Intensity 1: 2 (12/29/14 1105)    Pain Location 1: Foot Pain Location 1: Abdomen    Pain Intervention(s) 1: Medication (see MAR) Pain Intervention(s) 1: Medication (see MAR)  Patient Stated Pain Goal: 0 Patient Stated Pain Goal: 0  o Intervention effective: YES   o Other actions taken for pain: MEDICATION     Skin Assessment  Skin color Skin Color: Appropriate for ethnicity  Condition/Temperature Skin Condition/Temp: Dry, Warm  Integrity Skin Integrity: Incision (comment)  Turgor Turgor: Non-tenting  Weekly Pressure Ulcer Documentation Weekly Pressure Ulcer Documentation: No Pressure Ulcer Noted-Pressure Ulcer Prevention Initiated  Wound Prevention & Protection Methods  Orientation of wound Orientation of Wound Prevention: Posterior  Location of Prevention Location of Wound Prevention: Heel, Sacrum/Coccyx  Dressing Present Dressing Present : Yes  Dressing Status Dressing Status: Intact  Wound Offloading Wound Offloading (Prevention Methods): Bed, pressure redistribution/air, Repositioning     INTAKE/OUPUT    Date 04/22/17 0700 - 04/23/17 0659 04/23/17 0700 - 04/24/17 0659   Shift 5862-3960 6983-3310 24 Hour Total 1834-8428 5946-0130 24 Hour Total   I  N  T  A  K  E   P.O. 440  440         P. O. 440  440       Shift Total 440  440      O  U  T  P  U  T   Urine  1150 1150 875  875      Urine Voided  1150 1150 875  875      Urine Occurrence(s) 2 x 1 x 3 x 2 x  2 x    Emesis/NG output  0 0         Emesis  0 0       Stool  0 0 0  0      Stool Occurrence(s) 0 x 0 x 0 x 0 x  0 x      Stool  0 0 0  0    Shift Total  1150 1150 875  875    -1150 -710 -875  -875   Weight (kg)             Recommendations:  1. Patient needs and requests: TOILETING    2. Diet: CARDIAC REG    3. Pending tests/procedures: LABS    4. Functional Level/Equipment: wheelchair    5. Estimated Discharge Date: TBD Posted on Whiteboard in Patients Room: no     [facility] Safety Check    A safety check occurred in the patient's room between off going nurse and oncoming nurse listed above.     The safety check included the below items  Area Items   H  High Alert Medications - Verify all high alert medication drips (heparin, PCA, etc.)   E  Equipment - Suction is set up for ALL patients (with cailin)  - Red plugs utilized for all equipment (IV pumps, etc.)  - WOWs wiped down at end of shift.  - Room stocked with oxygen, suction, and other unit-specific supplies   A  Alarms - Bed alarm is set for fall risk patients  - Ensure chair alarm is in place and activated if patient is up in a chair   L  Lines - Check IV for any infiltration  - Jiang bag is empty if patient has a Jiang   - Tubing and IV bags are labeled   S  Safety   - Room is clean, patient is clean, and equipment is clean. - Hallways are clear from equipment besides carts. - Fall bracelet on for fall risk patients  - Ensure room is clear and free of clutter  - Suction is set up for ALL patients (with cailin)  - Hallways are clear from equipment besides carts.    - Isolation precautions followed, supplies available outside room, sign posted

## 2017-04-23 NOTE — PROGRESS NOTES
conducted an initial consultation and Spiritual Assessment for Reagan Floyd, who is a 59 y.o.,male. Patients Primary Language is: [place]. According to the patients EMR Islam Affiliation is: No preference. The reason the Patient came to the hospital is:   Patient Active Problem List    Diagnosis Date Noted    Vitamin D deficiency 04/22/2017    Aftercare following surgery of the circulatory system 04/21/2017    Status post femoral-popliteal bypass surgery 04/21/2017    Anticoagulated on Coumadin     Ischemic cardiomyopathy     Chronic systolic heart failure ([address]     Carotid artery disease ([address]     Dyslipidemia     Hyperuricemia     Transient alteration of awareness 04/18/2017    Hyperammonemia ([address] 04/18/2017    History of cardioversion 04/18/2017    Chronic atrial fibrillation ([address]     Critical ischemia of lower extremity 04/10/2017    Hypomagnesemia 04/06/2017    Hypothyroidism 04/06/2017    Elevated creatine kinase level 04/05/2017    Acute blood loss as cause of postoperative anemia 04/04/2017    Impaired mobility and ADLs 04/04/2017    Hypokalemia 04/04/2017    Chronic ulcer of right foot ([address] 04/04/2017    Coronary artery disease involving native coronary artery of native heart 03/15/2017    Aortoiliac occlusive disease ([address] 01/25/2017    Atherosclerosis of native artery of both lower extremities with intermittent claudication ([address] 01/25/2017    Peripheral artery disease ([address] 01/25/2017    Hereditary peripheral neuropathy 11/15/2016    Erectile dysfunction 07/05/2016    Spinal stenosis of lumbar region with radiculopathy 05/04/2015    Benign hypertensive heart disease with systolic congestive heart failure, NYHA class 2 ([address] 01/26/2015    Vitamin B12 deficiency 01/26/2015    DDD (degenerative disc disease), lumbar 01/26/2015        The  provided the following Interventions:  Initiated a relationship of care and support.    Listened empathically. Provided chaplaincy education. Provided information about Spiritual Care Services. Offered assurance of continued prayers on patient's behalf. Chart reviewed. The following outcomes where achieved:  Patient shared limited information about both their medical narrative and spiritual journey/beliefs. Patient processed feeling about current hospitalization. Patient expressed gratitude for 's visit. Assessment:  Patient does not have any Yazidi/cultural needs that will affect patients preferences in health care. There are no spiritual or Yazidi issues which require intervention at this time. Plan:  Chaplains will continue to follow and will provide pastoral care on an as needed/requested basis.  recommends bedside caregivers page  on duty if patient shows signs of acute spiritual or emotional distress.         [id number].   [phone]

## 2017-04-23 NOTE — PROGRESS NOTES
[facility] PHYSICAL [address], [address]     INPATIENT REHABILITATION  DAILY PROGRESS NOTE     Date: 4/23/2017    Name: Reagan Floyd Age / Sex: 59 y.o. / male   CSN: [id number] MRN: [id number]   [facility] Date: 4/21/2017 Length of Stay: 2 days     Primary Rehab Diagnosis: Impaired Mobility and ADLs secondary to:  1. S/P Right axillary to bifemoral artery bypass using an 8 mm Propaten graft from the right axilla to the right common femoral artery with a jump femoral-femoral bypass with another 8 mm Propaten external ring bypass; Right common femoral artery, and profunda femoris artery and superficial femoral artery endarterectomy causing an extensive surgery on the right side (4/4/2017 - Dr. Jovanni Hightower); S/P Cutdown of femoral-femoral bypass, more on the left groin side; Right lower extremity angiography with first-order catheterization (4/7/2017 - Dr. Jovanni Hightower); S/P Right femoral to above-knee popliteal bypass with an 8 mm PTFE graft (4/10/2017 - Dr. Chasidy Hooper)  2. History of critical ischemia of the right lower limb secondary to peripheral arterial disease      Subjective:     No new issues or problems reported. Blood pressure controlled.       Objective:     Vital Signs:  Patient Vitals for the past 24 hrs:   BP Temp Pulse Resp SpO2   04/23/17 0909 124/72 97.3 °F (36.3 °C) 69 18 100 %   04/23/17 0543 122/60 - 88 - -   04/22/17 1936 106/65 97.8 °F (36.6 °C) 94 18 98 %   04/22/17 1742 120/69 98 °F (36.7 °C) 89 18 100 %   04/22/17 1244 122/60 - - - -        Current Medications:  Current Facility-Administered Medications   Medication Dose Route Frequency    pneumococcal 23-valent (PNEUMOVAX 23) injection 0.5 mL  0.5 mL IntraMUSCular PRIOR TO DISCHARGE    levothyroxine (SYNTHROID) tablet 100 mcg  100 mcg Oral ACB    cholecalciferol (VITAMIN D3) capsule 5,000 Units  5,000 Units Oral DAILY    ferrous sulfate tablet 325 mg  1 Tab Oral DAILY WITH BREAKFAST    ascorbic acid (vitamin C) (VITAMIN C) tablet 250 mg  250 mg Oral DAILY WITH BREAKFAST    acetaminophen (TYLENOL) tablet 650 mg  650 mg Oral Q4H PRN    docusate sodium (COLACE) capsule 100 mg  100 mg Oral BID    bisacodyl (DULCOLAX) tablet 10 mg  10 mg Oral Q48H PRN    amiodarone (CORDARONE) tablet 400 mg  400 mg Oral BID    dilTIAZem CD (CARDIZEM CD) capsule 120 mg  120 mg Oral DAILY    oxyCODONE-acetaminophen (PERCOCET 10)  mg per tablet 1 Tab  1 Tab Oral Q4H PRN    metoprolol tartrate (LOPRESSOR) tablet 25 mg  25 mg Oral Q12H    clopidogrel (PLAVIX) tablet 75 mg  75 mg Oral DAILY WITH DINNER    aspirin chewable tablet 81 mg  81 mg Oral DAILY WITH BREAKFAST    losartan (COZAAR) tablet 25 mg  25 mg Oral DAILY    DULoxetine (CYMBALTA) capsule 60 mg  60 mg Oral DAILY    Lactobacillus Acidoph & Bulgar (FLORANEX) tablet 2 Tab  2 Tab Oral BID    [START ON 5/3/2017] amiodarone (CORDARONE) tablet 200 mg  200 mg Oral BID    WARFARIN INFORMATION NOTE (COUMADIN)   Other Q24H    gabapentin (NEURONTIN) capsule 100 mg  100 mg Oral BID    zinc sulfate (ZINCATE) capsule 220 mg  1 Cap Oral DAILY    atorvastatin (LIPITOR) tablet 40 mg  40 mg Oral QHS       Allergies:  No Known Allergies      Lab/Data Review:  Recent Results (from the past 24 hour(s))   PROTHROMBIN TIME + INR    Collection Time: 04/23/17  6:20 AM   Result Value Ref Range    Prothrombin time 22.5 (H) 11.5 - 15.2 sec    INR 2.1 (H) 0.8 - 1.2         Estimated Glomerular Filtration Rate:  On admission, based on a Creatinine of 0.63 mg/dl:   Estimated GFR using CKD-EPI = 107.9 mL/min/1.73m2   Estimated GFR using MDRD = 138.5 mL/min/1.73m2      Assessment:     Primary Rehabilitation Diagnosis  1. Impaired Mobility and ADLs  2.  S/P Right axillary to bifemoral artery bypass using an 8 mm Propaten graft from the right axilla to the right common femoral artery with a jump femoral-femoral bypass with another 8 mm Propaten external ring bypass; Right common femoral artery, and profunda femoris artery and superficial femoral artery endarterectomy causing an extensive surgery on the right side (4/4/2017 - Dr. Cait Garcia); S/P Cutdown of femoral-femoral bypass, more on the left groin side; Right lower extremity angiography with first-order catheterization (4/7/2017 - Dr. Cait Garcia); S/P Right femoral to above-knee popliteal bypass with an 8 mm PTFE graft (4/10/2017 - Dr. Jazmine Rodriguez)  3. History of critical ischemia of the right lower limb secondary to peripheral arterial disease     Comorbidities   Benign hypertensive heart disease with systolic congestive heart failure, NYHA class 2     History of vitamin B12 deficiency    DDD (degenerative disc disease), lumbar    Erectile dysfunction    Spinal stenosis of lumbar region with radiculopathy    Hereditary peripheral neuropathy    Aortoiliac occlusive disease     Atherosclerosis of native artery of both lower extremities with intermittent claudication     Peripheral artery disease     Coronary artery disease involving native coronary artery of native heart    Chronic atrial fibrillation     History of transient alteration of awareness, resolved    Acute blood loss as cause of postoperative anemia    Anticoagulated on Coumadin    Ischemic cardiomyopathy    Chronic systolic heart failure     Carotid artery disease     Dyslipidemia    History of hyperuricemia    Euthyroid sick syndrome    Aftercare following surgery of the circulatory system    History of cardioversion    Chronic ulcers of right foot    Vitamin D deficiency       Plan:     1. Justification for continued stay: Good progression towards established rehabilitation goals. 2. Medical Issues being followed closely:    [x]  Fall and safety precautions     [x]  Wound Care     [x]  Bowel and Bladder Function     [x]  Fluid Electrolyte and Nutrition Balance     [x]  Pain Control      3.  Issues that 24 hour rehabilitation nursing is following:    [x]  Fall and safety precautions     [x]  Wound Care     [x]  Bowel and Bladder Function     [x]  Fluid Electrolyte and Nutrition Balance     [x]  Pain Control      [x]  Assistance with and education on in-room safety with transfers to and from the bed, wheelchair, toilet and shower. 4. Acute rehabilitation plan of care:    [x]  Continue current care and rehab. [x]  Physical Therapy           [x]  Occupational Therapy           []  Speech Therapy     []  Hold Rehab until further notice     5. Medications:    [x]  MAR Reviewed     [x]  Continue Present Medications     6. DVT Prophylaxis:      []  Lovenox     []  Arixtra       []  Unfractionated Heparin     [x]  Coumadin     []  NOAC     [x]  VASQUEZ Stockings     [x]  Sequential Compression Device     []  None     7. Orders:   > S/P Right axillary to bifemoral artery bypass using an 8 mm Propaten graft from the right axilla to the right common femoral artery with a jump femoral-femoral bypass with another 8 mm Propaten external ring bypass; Right common femoral artery, and profunda femoris artery and superficial femoral artery endarterectomy causing an extensive surgery on the right side (4/4/2017 - Dr. Kody Daugherty); S/P Cutdown of femoral-femoral bypass, more on the left groin side; Right lower extremity angiography with first-order catheterization (4/7/2017 - Dr. Kody Daugherty); S/P Right femoral to above-knee popliteal bypass with an 8 mm PTFE graft (4/10/2017 - Dr. Joe Marx);  History of critical ischemia of the right lower limb secondary to peripheral arterial disease   > Continue:    > Aspirin 81 mg PO once daily with breakfast    > Atorvastatin 40 mg PO q HS    > Clopidogrel 75 mg PO once daily with dinner     > Acute Postoperative Blood Loss Anemia   > Hgb/Hct (3/15/2017, prior to admission to [facility]) = 14.3/40.8   > Hgb/Hct (4/4/2017, post-op) = 9.8/27.1    > Hgb/Hct (4/19/2017) = 8. 2/23.7   > Hgb/Hct (4/23/2017, on admission) = 8.0/23.3   > Anemia work-up showed serum iron 21, TIBC 196, iron % saturation 11, ferritin 779, reticulocyte count 3.7   > Patient was started on FeSO4 and Ascorbic acid   > Continue:    > FeSO4 325 mg PO once daily with breakfast     > Ascorbic Acid 250 mg PO once daily with breakfast (to enhance the absorption of the FeSO4)      > Benign hypertensive heart disease with chronic systolic heart failure   > Continue:    > Diltiazem  mg PO once daily (6AM)    > Metoprolol tartrate 25 mg PO q 12 hr (9AM, 9PM)    > Losartan 25 mg PO once daily (12PM)     > Chronic atrial fibrillation, S/P Synchronized external cardioversion (4/18/2017 - Dr. Charlotte Chew), anticoagulated on Coumadin   > Continue:    > Amiodarone 400 mg PO BID to complete 14 days (until 4/21/2017), then decrease to 200 mg PO BID afterwards    > Diltiazem  mg PO once daily (6AM)    > Metoprolol tartrate 25 mg PO q 12 hr (9AM, 9PM)   > Target INR = 2.5 to 3.5   > Patient received Coumadin 2 mg PO last night   > INR 2.7   > Coumadin 3 mg PO tonight     > Chronic ulcer of right foot   > Heel suspension boot on both feet when supine in bed   > Wound care recommendations as per Podiatry: Betadine dressings, non compressive, with 4x4s, conform once daily   > Patient was started on Ascorbic acid and Zinc sulfate   > Continue:    > Ascorbic acid 250 mg PO once daily    > Zinc sulfate 220 mg PO once daily      > Sick euthyroid syndrome   > TSH (4/6/2017) = 4.77   > TSH (4/22/2017) = 5.08   > Free T4 (4/22/2017) = 1.2    > On 4/22/2017, patient was started on oral Levothyroxine 100 mcg PO once daily    > Patient was given Levothyroxine 100 mcg PO once daily x 2 doses   > Decrease Levothyroxine to 50 mcg PO once daily x 2 doses, then decrease to 25 mcg PO once daily x 2 doses then discontinue     > History of hyperuricemia   > Uric acid (7/6/2016) = 9.8   > Uric acid (4/21/2017) = 2.9     > Vitamin D Deficiency   > Vitamin D 25-Hydroxy (4/22/2017) = 12.0   > Patient was given Cholecalciferol 50,000 units PO x 1 dose    > Cholecalciferol 5,000 units PO once daily     > Analgesia   > Continue:    > Acetaminophen 650 mg PO q 4 hr PRN for pain level less than 5/10    > Duloxetine 60 mg PO once daily    > Gabapentin 100 mg PO BID    > Percocet 10/325 1 tab PO q 4 hr PRN for pain level greater than 4/10       Signed:    Loreto Tyson MD    April 23, 2017

## 2017-04-23 NOTE — CONSULTS
ARU PSYCHOLOGICAL SCREENING    Assessment Initiated:  April 22, 2017    Rehab Diagnosis:  General Debilitation Secondary to Fem Pop Bypass    Pertinent Physical/Psychiatric History:     Patient Active Problem List   Diagnosis Code    Benign hypertensive heart disease with systolic congestive heart failure, NYHA class 2 (HCC) I11.0, I50.20    Vitamin B12 deficiency E53.8    DDD (degenerative disc disease), lumbar M51.36    Erectile dysfunction N52.9    Spinal stenosis of lumbar region with radiculopathy M48.06, M54.16    Hereditary peripheral neuropathy G60.9    Aortoiliac occlusive disease ([address] I74.09    Atherosclerosis of native artery of both lower extremities with intermittent claudication ([address] I70.213    Peripheral artery disease ([address] I73.9    Coronary artery disease involving native coronary artery of native heart I25.10    Chronic atrial fibrillation (HCC) I48.2    Transient alteration of awareness R40.4    Acute blood loss as cause of postoperative anemia D62    Impaired mobility and ADLs Z74.09    Anticoagulated on Coumadin Z51.81, Z79.01    Ischemic cardiomyopathy I25.5    Chronic systolic heart failure (HCC) I50.22    Carotid artery disease (HCC) I77.9    Hypomagnesemia E83.42    Elevated creatine kinase level R74.8    Hypokalemia E87.6    Hyperammonemia (HCC) E72.20    Dyslipidemia E78.5    Hyperuricemia E79.0    Hypothyroidism E03.9    Aftercare following surgery of the circulatory system Z48.812    Status post femoral-popliteal bypass surgery Z95.828    History of cardioversion Z98.890    Critical ischemia of lower extremity I99.8    Chronic ulcer of right foot ([address] L97.519    Vitamin D deficiency E55.9       Patient denies current psychiatric services but is Rx Cymbalta on admit to ARU. He recalled remote tobacco use, more recent cannabis use, and apparently consumes alcohol daily, perhaps as much as one half case of beer on weekends.         OBJECTIVE  GENERAL OBSERVATIONS  Willingness to participate in program: [x] good   [] fair [] indifferent [] poor    General Appearance:  Patient observed casually and appropriately dressed and groomed, with very long beard, sitting comfortably in reclining chair in room and in no apparent distress. Sensory Impairments:  Patient has satisfactory auditory reception and comprehension and responds to inquiry with intelligibility. Taoism Affiliation:  Unknown    Admission Assessment  Discharge Status   [x] alert  [] lethargic  [] difficult to arouse  [] fluctuating  [] other: Level of Consciousness [x] alert  [] lethargic  [] difficult to arouse  [] fluctuating  [] other:   [x] person  [x] place  [] time  [x] situation Oriented [x] person  [x] place  [x] time  [x] situation   [x] within normal limits  [] impaired       [] mild        [] moderate        [] severe Attention [x] within normal limits  [] impaired       [] mild        [] moderate        [] severe   [] within normal limits  [x] impaired       [x] mild        [] moderate        [] severe Memory [x] within normal limits  [x] impaired       [x] mild        [] moderate        [] severe   [x] appropriate to situation  [] depressed  [] anxious  [] angry   [] fearful  [] emotionally labile  [x] other: Acknowledges \"concerns\" with onset of cardiac diagnosis Mood [x] appropriate to situation  [] depressed  [] anxious  [] angry   [] fearful  [] emotionally labile  [] other:   [x] appropriate  [] flat  [] inappropriate to content of speech Affect [x] appropriate  [] flat  [] inappropriate to content of speech   [x] appropriate  [] aggressive/agitated  [] withdrawn  [] inappropriate  [x] other: Patient may sometimes \"get ahead\" of himself and require cues for appropriate pace in rehabilitation.  Behavior [x] appropriate  [] aggressive/agitated  [] withdrawn  [] inappropriate  [] other:   [] good  [x] limited  [] denial  [] none Insight Into Illness [x] good  [] limited  [] denial  [] none   [x] intact  [x] impaired       [x] mild        [] moderate        [] severe       Describe: Cues and prompts for recall of new information will be important. Cognition [x] intact  [x] impaired       [x] mild        [] moderate        [] severe       Describe:    [x] coping  [] demonstrates poor adjustment  [] undetermined       As evidenced by:  Patient Adjustment to Disability [x] coping  [] demonstrates poor adjustment  [] undetermined       As evidenced by:    [] coping  [] demonstrates poor adjustment  [x] undetermined      As evidenced by: While not available on evaluation, he feels well supported by his daughter in [facility]. Family Adjustment to Disability [x] coping  [] demonstrates poor adjustment  [] undetermined      As evidenced by:      ASSESSMENT  Clinical Impression:  Patient is a 59year old,  for twenty years, employed ( for [employer] in [facility]),  male; he resides alone in one level trailer with three steps to enter in [place], [place]. Patient is in close contact with his only child, a daughter residing in [place]. Patient is able to describe some circumstances of his medical problems precipitating referral for rehabilitation; he emphasized his concerns and underlying distress feelings about diagnoses for cardiac problems, as well. He is eager to return to baseline function and resume his employment, as soon as possible. Patient is, therefore, obviously motivated to participate in rehabilitation and will be monitored for any difficulties during his effort. For instance, he may not utilize effective safety and pace in his recovery effort because of his eagerness to \"return to normal.\"    Emotionally, patient is already Rx Cymbalta on admit to ARU. He otherwise denies current psychiatric services.   In fact, he denies formal indications for mood disturbances such as anxiety nor depression; however, as noted, he is now made \"aware of cardiac problems\" and this has caused some underlying \"worry\" for him. Importantly, in spite of actually having multiple medical diagnoses/problems, he is not expressing particular worry about same. Instead, he seems hopeful for his return to normalcy and ability to resume daily activities, as before hospitalization. Cognitively, first, patient reports that he is a high school graduate and is employed in operation of heavy equipment. But, on evaluation, he is not fully oriented and seems to have mild problems with aspects of mental processing and problem solving, though not particularly significant. His immediate auditory attention is good; however, his recall of new information after a brief delay is impaired and this may require cues/prompts for his maximum effort and carry over of new treatment information. Patient Strengths:  Alert, pleasant, cooperative and obviously motivated to improve. Patient Preferences:  Return to residence and employment, as quickly as possible. Rehab Potential:  Good, barring any cognitive interferences. Educational Needs: Under each heading list the specific items in which the patient or family will need education/training. Example: hip precautions, use of walker, ADL equipment, neglect, judgment, adjustment, etc.     Special considerations or accommodations for teaching:  [x] Yes     [] No     [] NA  If Yes, explain: Noted problems in carry over of new information, as well as uncertain pace in effort to recovery; patient may not pay attention to safety.  Discharge Status    Completed Demonstrated/ Verbalized Understanding    Yes No Yes No   Info regarding disability: Limited insight about recovery [x] [] [x] []   Adjustment: Short term, increased dependency [x] [] [x] []   Cognition: Impaired carry over of new information [x] [] [x] []   Other: Safety and pace [x] [] [x] []   Other: Underlying worry about health issues [x] [] [x] []   If education not completed, explain: [] [] [] []     PLAN  Problem: Limited insight about recovery  Long Term Goal: Increase insight about recovery  Intervention: Patient education  At Discharge - LTG Achieved: [x] Yes [] No If not achieved, explain:    Problem: Forced dependency in recovery  Long Term Goal: Accept increased dependency  Intervention: Patient education  At Discharge - LTG Achieved: [x] Yes [] No If not achieved, explain:    Problem: Safety and pace  Long Term Goal: Maximize safety awareness and pace  Intervention: Cues, prompts and redirection  At Discharge - LTG Achieved: [x] Yes [] No If not achieved, explain:    Problem: Impaired carry over of new information  Long Term Goal: Improve carry over  Intervention: Cues, prompts, repetition and reinforcement  At Discharge - LTG Achieved: [x] Yes [] No If not achieved, explain:    Problem: Expressed \"underlying\" worry about health issues  Long Term Goal: Understand all health circumstances  Intervention: Patient education and reinforce proactive healthcare measures  At Discharge - LTG Achieved: [x] Yes [] No If not achieved, explain:    Alison Casas, THE [facility]  4/23/2017 11:04 AM    DISCHARGE STATUS    Clinical Impressions: Patient persevered in his therapy effort on ARU and was satisfied with his gains made. He maintained a pleasant and cooperative mood and demeanor and never displayed any acute feelings of distress. Patient was well supported by his immediate family and was pleased when informed that he could discharge to home, sooner than he expected.     Follow-up Services Recommended Purpose                 Alison Casas, PHD  Discharge Date/Time:

## 2017-04-23 NOTE — REHAB NOTE
[facility] PHYSICAL REHABILITATION  [address] [place] [address]    Name: Jayleen Mclean CSN: [id number]   Age: 59 y.o. MRN: [id number]   Sex: male Admit Date: 4/21/2017     Primary Rehabilitation Diagnosis  1. Impaired Mobility and ADLs  2. S/P Right axillary to bifemoral artery bypass using an 8 mm Propaten graft from the right axilla to the right common femoral artery with a jump femoral-femoral bypass with another 8 mm Propaten external ring bypass; Right common femoral artery, and profunda femoris artery and superficial femoral artery endarterectomy causing an extensive surgery on the right side (4/4/2017 - Dr. Nando Weaver); S/P Cutdown of femoral-femoral bypass, more on the left groin side; Right lower extremity angiography with first-order catheterization (4/7/2017 - Dr. Nando Weaver); S/P Right femoral to above-knee popliteal bypass with an 8 mm PTFE graft (4/10/2017 - Dr. Tiffany Herrera)  3.  History of critical ischemia of the right lower limb secondary to peripheral arterial disease      Comorbidities   Benign hypertensive heart disease with systolic congestive heart failure, NYHA class 2     History of vitamin B12 deficiency    DDD (degenerative disc disease), lumbar    Erectile dysfunction    Spinal stenosis of lumbar region with radiculopathy    Hereditary peripheral neuropathy    Aortoiliac occlusive disease     Atherosclerosis of native artery of both lower extremities with intermittent claudication     Peripheral artery disease     Coronary artery disease involving native coronary artery of native heart    Chronic atrial fibrillation     History of transient alteration of awareness, resolved    Acute blood loss as cause of postoperative anemia    Anticoagulated on Coumadin    Ischemic cardiomyopathy    Chronic systolic heart failure     Carotid artery disease     Dyslipidemia    History of hyperuricemia    Euthyroid sick syndrome    Aftercare following surgery of the circulatory system    History of cardioversion    Chronic ulcers of right foot    Vitamin D deficiency       ANTICIPATED INTERVENTIONS THAT SUPPORT THE MEDICAL NECESSITY OF THIS ADMISSION:    I. Physical Therapy              A. Intensity: 1.5 hour per day              B. Frequency: 5 times per week              C. Duration: 2 weeks              D. Long Term Goals:    1. Patient will move from supine to sit and sit to supine in bed with independence. 2. Patient will transfer from bed to chair and chair to bed with modified independence using the least restrictive device. 3. Patient will perform sit to stand with modified independence. 4. Patient will ambulate with modified independence for 150 feet with the least restrictive device. 5. Patient will ascend/descend 4 stairs with one handrail(s) with modified independence. E. Interventions: Interventions may include range of motion (AROM, PROM B LE/trunk), motor function (B LE/trunk strengthening/coordination), activity tolerance (vitals, oxygen saturation levels), bed mobility training, balance activities, gait training (progressive ambulation program), and functional transfer training. II. Occupational Therapy  21 . Intensity: 1.5 hour per day              B. Frequency: 5 times per week              C. Duration: 1-2 weeks              D. Long Term Goals:    1. Pt will perform grooming with independence while standing. 2. Pt will perform UB bathing with modified independence. 3. Pt will perform LB bathing with modified independence. 4. Pt will perform tub/shower transfer with modified independence. 5. Pt will perform UB dressing with independence. 6. Pt will perform LB dressing with modified independence. 7. Pt will perform toileting task with modified independence. 8. Pt will perform toilet transfer with modified independence.    E. Interventions: Interventions may include range of motion (AROM, PROM B UE), motor function (B UE/ strengthening/coordination), activity tolerance (vitals, oxygen saturation levels), balance training, ADL/IADL training and functional transfer training. PHYSICIAN'S ASSESSMENT OF FINDINGS:    Are the established goals sufficient for achieving the optimal level of function? [x]  Yes      []  No    What changes would you recommend to the goals as written? None      Are the interventions noted sufficient for achieving the optimal level of function? [x]  Yes      []  No    What changes would you recommend to the interventions noted? If therapy staff is unable to provide 3 hr of total therapy per day in 5 days due to medical issues or decreased patient tolerance, may modify treatment schedule to 15 hr/week.       Estimated length of stay: 1-2 weeks      Medical rehabilitation prognosis:    []  Excellent     [x]  Good     []  Fair     []  Guarded       Discharge Destination:     [x]  Home     []  [address]     []  [facility]     []  [facility]     []  [place]     []  Other:       Signed:    Radha Greenberg MD    April 24, 2017

## 2017-04-23 NOTE — PROGRESS NOTES
Bedside and Verbal shift change report given to Reggie Mac, LPN (oncoming nurse) by Gabriel Ervin RN   (offgoing nurse). Report included the following information SBAR, Kardex, Intake/Output, MAR and Recent Results.

## 2017-04-23 NOTE — PROGRESS NOTES
[x] Psychology  [] Social Work [] Recreational Therapy    INTERVENTION  UNITS/TIME OF SERVICE   Assessment April 22, 2017   Supportive Counseling    Orientation    Discharge Planning    Resource Linkage              Progress/Current Status    Patient seen for Psychological Evaluation as requested on admission to ARU by Dr. Kendy Moya. Patient found sitting comfortably in reclining chair in bedroom and in no apparent distress. Patient is very affable and engages easily in conversation about himself and his circumstances. He is very motivated to improve and regain functional independence. If anything, he needs to be advised about safety and pace in his recovery as he seems to be somewhat \"hyper\" in his level of activity and made require cues to slow his pace, for safety. Patient is currently Rx Cymbalta but otherwise denies psychiatric services. Patient will be monitored for any emotional and/or behavioral difficulties while on ARU.     Mitra Kam, PHD 4/23/2017 11:00 AM

## 2017-04-23 NOTE — PROGRESS NOTES
SHIFT CHANGE NOTE FOR [facility]    Bedside and Verbal shift change report given to  Makayla Lombardo RN oncoming nurse) by Cooper Campos RN   (offgoing nurse). Report included the following information SBAR, Kardex, MAR and Recent Results.     Situation:   Code Status: Full Code   Reason for Admission: [address] Day: 2   Problem List:   Hospital Problems  Date Reviewed: 4/21/2017          Codes Class Noted POA    Vitamin D deficiency (Chronic) ICD-10-CM: E55.9  ICD-9-CM: 268.9  4/22/2017 Yes    Overview Signed 4/22/2017 12:56 PM by Yvrose Huizar MD     Vitamin D 25-Hydroxy (4/22/2017) = 12.0             Anticoagulated on Coumadin (Chronic) ICD-10-CM: Z51.81, Z79.01  ICD-9-CM: V58.83, V58.61  Unknown Yes        Hyperuricemia (Chronic) ICD-10-CM: E79.0  ICD-9-CM: 790.6  Unknown Yes        Aftercare following surgery of the circulatory system ICD-10-CM: J82.958  ICD-9-CM: V58.73  4/21/2017 Yes    Overview Signed 4/21/2017  2:32 PM by Yvrose Huizar MD     S/P Right axillary to bifemoral artery bypass using an 8 mm Propaten graft from the right axilla to the right common femoral artery with a jump femoral-femoral bypass with another 8 mm Propaten external ring bypass; Right common femoral artery, and profunda femoris artery and superficial femoral artery endarterectomy causing an extensive surgery on the right side (4/4/2017 - Dr. Esmer Acosta)    S/P Cutdown of femoral-femoral bypass, more on the left groin side; Right lower extremity angiography with first-order catheterization (4/7/2017 - Dr. Esmer Acosta)    S/P Right femoral to above-knee popliteal bypass with an 8 mm PTFE graft (4/10/2017 - Dr. Land)             * (Principal)Status post femoral-popliteal bypass surgery ICD-10-CM: Z95.828  ICD-9-CM: V45.89  4/21/2017 Yes    Overview Signed 4/21/2017  2:33 PM by Yvrose Huizar MD     S/P Right axillary to bifemoral artery bypass using an 8 mm Propaten graft from the right axilla to the right common femoral artery with a jump femoral-femoral bypass with another 8 mm Propaten external ring bypass; Right common femoral artery, and profunda femoris artery and superficial femoral artery endarterectomy causing an extensive surgery on the right side (4/4/2017 - Dr. Marcia Portillo)    S/P Cutdown of femoral-femoral bypass, more on the left groin side; Right lower extremity angiography with first-order catheterization (4/7/2017 - Dr. Marcia Portillo)    S/P Right femoral to above-knee popliteal bypass with an 8 mm PTFE graft (4/10/2017 - Dr. Ny Chau)             History of cardioversion ICD-10-CM: Z98.890  ICD-9-CM: V15.1  4/18/2017 Yes    Overview Signed 4/21/2017  2:34 PM by Suraj Garcia MD     S/P Synchronized external cardioversion (4/18/2017 - Dr. Vinita Lazcano)             Chronic atrial fibrillation ([address] (Chronic) ICD-10-CM: B62.9  ICD-9-CM: 427.31  Unknown Yes        Critical ischemia of lower extremity ICD-10-CM: I99.8  ICD-9-CM: 459.9  4/10/2017 No    Overview Signed 4/21/2017  4:41 PM by Suraj Garcia MD     Right lower limb             Hypothyroidism (Chronic) ICD-10-CM: E03.9  ICD-9-CM: 244.9  4/6/2017 Yes        Acute blood loss as cause of postoperative anemia ICD-10-CM: D62  ICD-9-CM: 285.1  4/4/2017 Yes        Impaired mobility and ADLs ICD-10-CM: Z74.09  ICD-9-CM: 799.89  4/4/2017 Yes        Chronic ulcer of right foot ([address] (Chronic) ICD-10-CM: L97.519  ICD-9-CM: 707.15  4/4/2017 Yes        Aortoiliac occlusive disease ([address] (Chronic) ICD-10-CM: I68.17  ICD-9-CM: 444.09  1/25/2017 Yes        Atherosclerosis of native artery of both lower extremities with intermittent claudication (HCC) (Chronic) ICD-10-CM: X75.997  ICD-9-CM: 440.21  1/25/2017 Yes        Peripheral artery disease (HCC) (Chronic) ICD-10-CM: I73.9  ICD-9-CM: 443.9  1/25/2017 Yes        Benign hypertensive heart disease with systolic congestive heart failure, NYHA class 2 (HCC) (Chronic) ICD-10-CM: I11.0, I50.20  ICD-9-CM: 402.11, 428.20, 428.0  1/26/2015 Yes              Background:   Past Medical History:   Past Medical History:   Diagnosis Date    Acute blood loss as cause of postoperative anemia 4/4/2017    Anticoagulated on Coumadin     Aortoiliac occlusive disease ([facility] 1/25/2017    Atherosclerosis of native artery of both lower extremities with intermittent claudication ([facility] 1/25/2017    Benign hypertensive heart disease with systolic congestive heart failure, NYHA class 2 ([facility] 1/26/2015    Carotid artery disease (HCC)     Chronic atrial fibrillation (HCC)     Chronic systolic heart failure (HCC)     Chronic ulcer of right foot ([facility] 4/4/2017    Coronary artery disease involving native coronary artery of native heart 3/15/2017    Successful stenting of Cx (Xience LESLEY) and RCA (Xience LESLEY) to 0% by Dr. Concepción Griffin on 3/15/17.     DDD (degenerative disc disease), lumbar 1/26/2015    Dyslipidemia     Elevated TSH 4/6/2017    Erectile dysfunction 7/5/2016    Hereditary peripheral neuropathy 11/15/2016    History of cardioversion 4/18/2017    S/P Synchronized external cardioversion (4/18/2017 - Dr. Vinita Lazcano)    Hyperuricemia     Hypothyroidism 4/6/2017    Ischemic cardiomyopathy     Peripheral artery disease ([facility] 1/25/2017    Spinal stenosis of lumbar region with radiculopathy 5/4/2015    Dr. Abhijeet Bartholomew Vitamin D deficiency 4/22/2017    Vitamin D 25-Hydroxy (4/22/2017) = 12.0      Patient taking anticoagulants YES   Patient has a defibrillator: no     Assessment:   Changes in Assessment throughout shift: NONE                     Last Vitals:     Vitals:    04/22/17 1244 04/22/17 1742 04/22/17 1936 04/23/17 0543   BP: 122/60 120/69 106/65 122/60   Pulse:  89 94 88   Resp:  18 18    Temp:  98 °F (36.7 °C) 97.8 °F (36.6 °C)    SpO2:  100% 98%         PAIN    Pain Assessment    Pain Intensity 1: 2 (04/23/17 0624) Pain Intensity 1: 2 (12/29/14 1105)    Pain Location 1: Foot, Groin, Incisional Pain Location 1: Abdomen    Pain Intervention(s) 1: Medication (see MAR) Pain Intervention(s) 1: Medication (see MAR)  Patient Stated Pain Goal: 0 Patient Stated Pain Goal: 0  o Intervention effective: YES   o Other actions taken for pain: MEDICATION     Skin Assessment  Skin color Skin Color: Appropriate for ethnicity  Condition/Temperature Skin Condition/Temp: Dry, Warm  Integrity Skin Integrity: Incision (comment)  Turgor Turgor: Non-tenting  Weekly Pressure Ulcer Documentation Weekly Pressure Ulcer Documentation: No Pressure Ulcer Noted-Pressure Ulcer Prevention Initiated  Wound Prevention & Protection Methods  Orientation of wound Orientation of Wound Prevention: Posterior  Location of Prevention Location of Wound Prevention: Sacrum/Coccyx  Dressing Present Dressing Present : Yes  Dressing Status Dressing Status: Intact  Wound Offloading Wound Offloading (Prevention Methods): Bed, pressure reduction mattress     INTAKE/OUPUT    Date 04/22/17 0700 - 04/23/17 0659 04/23/17 0700 - 04/24/17 0659   Shift 6414-6836 4271-5254 24 Hour Total 3191-7519 1639-6254 24 Hour Total   I  N  T  A  K  E   P.O. 440  440         P. O. 440  440       Shift Total 440  440      O  U  T  P  U  T   Urine  1150 1150         Urine Voided  1150 1150         Urine Occurrence(s) 2 x 1 x 3 x       Emesis/NG output  0 0         Emesis  0 0       Stool  0 0         Stool Occurrence(s) 0 x 0 x 0 x         Stool  0 0       Shift Total  1150 1150       -1150 -710      Weight (kg)             Recommendations:  1. Patient needs and requests: TOILETING    2. Diet: CARDIAC REG    3. Pending tests/procedures: LABS    4. Functional Level/Equipment: wheelchair    5. Estimated Discharge Date: TBD Posted on Whiteboard in Patients Room: no     HEALS Safety Check    A safety check occurred in the patient's room between off going nurse and oncoming nurse listed above.     The safety check included the below items  Area Items H  High Alert Medications - Verify all high alert medication drips (heparin, PCA, etc.)   E  Equipment - Suction is set up for ALL patients (with cailin)  - Red plugs utilized for all equipment (IV pumps, etc.)  - WOWs wiped down at end of shift.  - Room stocked with oxygen, suction, and other unit-specific supplies   A  Alarms - Bed alarm is set for fall risk patients  - Ensure chair alarm is in place and activated if patient is up in a chair   L  Lines - Check IV for any infiltration  - Jiang bag is empty if patient has a Jiang   - Tubing and IV bags are labeled   S  Safety   - Room is clean, patient is clean, and equipment is clean. - Hallways are clear from equipment besides carts. - Fall bracelet on for fall risk patients  - Ensure room is clear and free of clutter  - Suction is set up for ALL patients (with cailin)  - Hallways are clear from equipment besides carts.    - Isolation precautions followed, supplies available outside room, sign posted

## 2017-04-24 LAB
ANION GAP BLD CALC-SCNC: 7 MMOL/L (ref 3–18)
BUN SERPL-MCNC: 12 MG/DL (ref 7–18)
BUN/CREAT SERPL: 17 (ref 12–20)
CALCIUM SERPL-MCNC: 9 MG/DL (ref 8.5–10.1)
CHLORIDE SERPL-SCNC: 99 MMOL/L (ref 100–108)
CO2 SERPL-SCNC: 27 MMOL/L (ref 21–32)
CREAT SERPL-MCNC: 0.7 MG/DL (ref 0.6–1.3)
GLUCOSE SERPL-MCNC: 90 MG/DL (ref 74–99)
HCT VFR BLD AUTO: 23.8 % (ref 36–48)
HGB BLD-MCNC: 8 G/DL (ref 13–16)
INR PPP: 2.1 (ref 0.8–1.2)
POTASSIUM SERPL-SCNC: 4.7 MMOL/L (ref 3.5–5.5)
PROTHROMBIN TIME: 22.5 SEC (ref 11.5–15.2)
SODIUM SERPL-SCNC: 133 MMOL/L (ref 136–145)

## 2017-04-24 PROCEDURE — A6209 FOAM DRSG <=16 SQ IN W/O BDR: HCPCS

## 2017-04-24 PROCEDURE — 97535 SELF CARE MNGMENT TRAINING: CPT

## 2017-04-24 PROCEDURE — 97161 PT EVAL LOW COMPLEX 20 MIN: CPT

## 2017-04-24 PROCEDURE — 85610 PROTHROMBIN TIME: CPT | Performed by: INTERNAL MEDICINE

## 2017-04-24 PROCEDURE — 36415 COLL VENOUS BLD VENIPUNCTURE: CPT | Performed by: INTERNAL MEDICINE

## 2017-04-24 PROCEDURE — 97150 GROUP THERAPEUTIC PROCEDURES: CPT

## 2017-04-24 PROCEDURE — 97530 THERAPEUTIC ACTIVITIES: CPT

## 2017-04-24 PROCEDURE — 74011250636 HC RX REV CODE- 250/636: Performed by: INTERNAL MEDICINE

## 2017-04-24 PROCEDURE — 97110 THERAPEUTIC EXERCISES: CPT

## 2017-04-24 PROCEDURE — 80048 BASIC METABOLIC PNL TOTAL CA: CPT | Performed by: INTERNAL MEDICINE

## 2017-04-24 PROCEDURE — 97116 GAIT TRAINING THERAPY: CPT

## 2017-04-24 PROCEDURE — 85018 HEMOGLOBIN: CPT | Performed by: INTERNAL MEDICINE

## 2017-04-24 PROCEDURE — 74011250637 HC RX REV CODE- 250/637: Performed by: INTERNAL MEDICINE

## 2017-04-24 PROCEDURE — 65310000000 HC RM PRIVATE REHAB

## 2017-04-24 RX ORDER — WARFARIN 3 MG/1
3 TABLET ORAL
Status: COMPLETED | OUTPATIENT
Start: 2017-04-24 | End: 2017-04-24

## 2017-04-24 RX ADMIN — OXYCODONE HYDROCHLORIDE AND ACETAMINOPHEN 1 TABLET: 10; 325 TABLET ORAL at 20:27

## 2017-04-24 RX ADMIN — CLOPIDOGREL BISULFATE 75 MG: 75 TABLET, FILM COATED ORAL at 17:45

## 2017-04-24 RX ADMIN — ASPIRIN 81 MG CHEWABLE TABLET 81 MG: 81 TABLET CHEWABLE at 09:02

## 2017-04-24 RX ADMIN — ATORVASTATIN CALCIUM 40 MG: 40 TABLET, FILM COATED ORAL at 20:28

## 2017-04-24 RX ADMIN — LOSARTAN POTASSIUM 25 MG: 25 TABLET ORAL at 12:34

## 2017-04-24 RX ADMIN — DULOXETINE HYDROCHLORIDE 60 MG: 60 CAPSULE, DELAYED RELEASE ORAL at 09:02

## 2017-04-24 RX ADMIN — Medication 5000 UNITS: at 09:02

## 2017-04-24 RX ADMIN — OXYCODONE HYDROCHLORIDE AND ACETAMINOPHEN 1 TABLET: 10; 325 TABLET ORAL at 15:01

## 2017-04-24 RX ADMIN — DILTIAZEM HYDROCHLORIDE 120 MG: 120 CAPSULE, COATED, EXTENDED RELEASE ORAL at 06:20

## 2017-04-24 RX ADMIN — ERYTHROPOIETIN 10000 UNITS: 10000 INJECTION, SOLUTION INTRAVENOUS; SUBCUTANEOUS at 14:33

## 2017-04-24 RX ADMIN — METOPROLOL TARTRATE 25 MG: 25 TABLET ORAL at 09:01

## 2017-04-24 RX ADMIN — OXYCODONE HYDROCHLORIDE AND ACETAMINOPHEN 1 TABLET: 10; 325 TABLET ORAL at 04:45

## 2017-04-24 RX ADMIN — WARFARIN SODIUM 3 MG: 3 TABLET ORAL at 12:34

## 2017-04-24 RX ADMIN — Medication 250 MG: at 09:02

## 2017-04-24 RX ADMIN — LACTOBACILLUS TAB 2 TABLET: TAB at 09:01

## 2017-04-24 RX ADMIN — GABAPENTIN 100 MG: 100 CAPSULE ORAL at 09:02

## 2017-04-24 RX ADMIN — OXYCODONE HYDROCHLORIDE AND ACETAMINOPHEN 1 TABLET: 10; 325 TABLET ORAL at 10:55

## 2017-04-24 RX ADMIN — GABAPENTIN 100 MG: 100 CAPSULE ORAL at 17:45

## 2017-04-24 RX ADMIN — DOCUSATE SODIUM 100 MG: 100 CAPSULE, LIQUID FILLED ORAL at 09:02

## 2017-04-24 RX ADMIN — LEVOTHYROXINE SODIUM 50 MCG: 25 TABLET ORAL at 09:01

## 2017-04-24 RX ADMIN — FERROUS SULFATE TAB 325 MG (65 MG ELEMENTAL FE) 325 MG: 325 (65 FE) TAB at 09:02

## 2017-04-24 RX ADMIN — OXYCODONE HYDROCHLORIDE AND ACETAMINOPHEN 1 TABLET: 10; 325 TABLET ORAL at 00:28

## 2017-04-24 RX ADMIN — AMIODARONE HYDROCHLORIDE 400 MG: 200 TABLET ORAL at 17:45

## 2017-04-24 RX ADMIN — ZINC SULFATE 220 MG (50 MG) CAPSULE 220 MG: CAPSULE at 09:02

## 2017-04-24 RX ADMIN — DOCUSATE SODIUM 100 MG: 100 CAPSULE, LIQUID FILLED ORAL at 17:45

## 2017-04-24 RX ADMIN — AMIODARONE HYDROCHLORIDE 400 MG: 200 TABLET ORAL at 09:02

## 2017-04-24 RX ADMIN — LACTOBACILLUS TAB 2 TABLET: TAB at 17:45

## 2017-04-24 NOTE — PROGRESS NOTES
Recreation Therapy Progress Note    Patient Name: Anson King  Patient Age: 59 y.o. Past Medical History:   Past Medical History:   Diagnosis Date    Acute blood loss as cause of postoperative anemia 2017    Anticoagulated on Coumadin     Aortoiliac occlusive disease (HCC) 2017    Atherosclerosis of native artery of both lower extremities with intermittent claudication ([facility] 2017    Benign hypertensive heart disease with systolic congestive heart failure, NYHA class 2 ([address] 2015    Carotid artery disease (HCC)     Chronic atrial fibrillation (HCC)     Chronic systolic heart failure (HCC)     Chronic ulcer of right foot ([facility] 2017    Coronary artery disease involving native coronary artery of native heart 3/15/2017    Successful stenting of Cx (Xience LESLEY) and RCA (Xience LESLEY) to 0% by Dr. Dustin Baer on 3/15/17.  DDD (degenerative disc disease), lumbar 2015    Dyslipidemia     Erectile dysfunction 2016    Euthyroid sick syndrome 2017    Hereditary peripheral neuropathy 11/15/2016    History of cardioversion 2017    S/P Synchronized external cardioversion (2017 - Dr. Shellie Villanueva)    Hyperuricemia     Ischemic cardiomyopathy     Peripheral artery disease ([facility] 2017    Spinal stenosis of lumbar region with radiculopathy 2015    Dr. Negrito Dhillon Vitamin D deficiency 2017    Vitamin D 25-Hydroxy (2017) = 12.0       Medical Diagnosis:  Debility   Status post femoral-popliteal bypass surgery Status post femoral-popliteal bypass surgery         Patient identified with name and :wristband yes    Time in:1510  Time out:1530  Pt was seen during co-tx with PT to demonstrate dynamic standing balance while participating in leisure intervention. Pt was able to participate in corn hole game while standing with RW and CGA from PT student. Pt was able to tolerate two standing trials without LOB while participating in session.  Pt appeared to enjoy session and socializing with staff and peers throughout. Pt appears encouraged by his ability to participate in session. Plan of Care: Follow up with patient to offer education and interventions for return to premorbid leisure participation to include, but not limited to: adaptive strategies, energy conservation techniques, and safety awareness during leisure participation.         Dali Hutchison, CTRS  4/24/2017

## 2017-04-24 NOTE — PROGRESS NOTES
Patient Name: Garth Mills  Patient Age: 59 y.o. Past Medical History:   Past Medical History:   Diagnosis Date    Acute blood loss as cause of postoperative anemia 4/4/2017    Anticoagulated on Coumadin     Aortoiliac occlusive disease (HCC) 1/25/2017    Atherosclerosis of native artery of both lower extremities with intermittent claudication ([facility] 1/25/2017    Benign hypertensive heart disease with systolic congestive heart failure, NYHA class 2 ([facility] 1/26/2015    Carotid artery disease (HCC)     Chronic atrial fibrillation (HCC)     Chronic systolic heart failure (HCC)     Chronic ulcer of right foot ([facility] 4/4/2017    Coronary artery disease involving native coronary artery of native heart 3/15/2017    Successful stenting of Cx (Xience LESLEY) and RCA (Xience LESLEY) to 0% by Dr. Zuly Armas on 3/15/17.  DDD (degenerative disc disease), lumbar 1/26/2015    Dyslipidemia     Erectile dysfunction 7/5/2016    Euthyroid sick syndrome 4/6/2017    Hereditary peripheral neuropathy 11/15/2016    History of cardioversion 4/18/2017    S/P Synchronized external cardioversion (4/18/2017 - Dr. Lawanda Peterson)    Hyperuricemia     Ischemic cardiomyopathy     Peripheral artery disease ([email or web address] 1/25/2017    Spinal stenosis of lumbar region with radiculopathy 5/4/2015    Dr. Michael Ramos Vitamin D deficiency 4/22/2017    Vitamin D 25-Hydroxy (4/22/2017) = 12.0       Medical Diagnosis:  Debility   Status post femoral-popliteal bypass surgery Status post femoral-popliteal bypass surgery           Recreation Therapy Initial Assessment    Living arrangements:  _x_ alone   __spouse   __relative/family   __roommate    __other    Cognitive status:   Oriented to:   _x_person   _x__place   _x__time   _x__situation  Responsiveness:  __x_alert   ___inconsistent   ___unresponsive    Mood: Pt was pleasant and cooperative.     Decision-making abilities: _x_demonstrates initiative   __with prompting   __dependent    Attention span: _x_good (+15 min.)   __fair(5-15min.)   __poor(5min. or less)  Follows directions: _x_good   ___fair   __poor prompts needed? __yes   __no    Communication:   _x_verbalizes needs   ___uses gestures   __unable to verbalize  __verbal - difficult to understand __augmentative device    Ability to understand conversation: _x_good   __fair   __poor    Vision: _x_good   __fair   __poor   __blind   __wears glasses    Hearing:  _x_good   __fair   __poor   __deaf   __hearing aid   __sign    Hears best in :___R ear   ___L ear    Premorbid mobility needs: _x_Independent   __assistance   __cane   __walker   __w/c    POSSIBLE LEISURE BARRIERS:         Cognitive Skills  Social Skills/Approp. Communication     Paralysis  Financial x General Weakness     ROM Limitations x Mobility  Endurance     Perceptual Problems  Grasp/Release  Fears/Phobias     Hearing Deficits  Visual Acuity  Motivation     Spasticity x Pain (R LE)  Self Confidence     Attitude         Patient Goals: Pt reports that he lives home alone, but reports he has family living locally. He endorses that he was working prior to his hospitalization. Pt reports that he enjoys being outdoors and \"can't wait to get back out. \" Pt endorsed that he spends most of his time working, but will go outside to do yard work. He also enjoys fishing and states that he has a friend with a boat and they will fish together.         Gomez Fontanez, CTRS  4/24/2017

## 2017-04-24 NOTE — PROGRESS NOTES
Problem: Self Care Deficits Care Plan (Adult)  Goal: *Acute Goals and Plan of Care (Insert Text)  Long Term Goals (to be met upon discharge date) in order to increase pts functional independence and safety, and decrease burden of care:  1. Pt will perform grooming with independence while standing. 2. Pt will perform UB bathing with modified independence. 3. Pt will perform LB bathing with modified independence. 4. Pt will perform tub/shower transfer with modified independence. 5. Pt will perform UB dressing with independence. 6. Pt will perform LB dressing with modified independence. 7. Pt will perform toileting task with modified independence. 8. Pt will perform toilet transfer with modified independence. Short Term Weekly Goals for (2017 to 2017) in order to increase pts functional independence and safety, and decrease burden of care:  1. Pt will perform grooming with independence while standing. 2. Pt will perform UB bathing with modified independence. 3. Pt will perform LB bathing with modified independence. 4. Pt will perform tub/shower transfer with supervision. 5. Pt will perform UB dressing with independence. 6. Pt will perform LB dressing with supervision. 7. Pt will perform toileting task with supervision. 8. Pt will perform toilet transfer with supervision. OCCUPATIONAL THERAPY DAILY NOTE  Patient Name:jM Stacy Paty  Time In: 9505  Time Out: 1130     Time In: 1330  Time Out: 0     Medical Diagnosis:  Debility   Status post femoral-popliteal bypass surgery Status post femoral-popliteal bypass surgery      Pain at start of tx: Pt reports 6-7/10 pain in R heel with weightbearing. Pain at stop of tx: Pt reports 6-7/10 pain in R heel with weightbearing. Additional pain also reported in low back with prolonged standing activity. Patient identified with name and : yes  Subjective: \"I Kelli Green go home but not until you think I'm ready. \"     Objective:      THERAPEUTIC ACTIVITY Daily Assessment     Pt worked on static standing tolerance and endurance while playing tabletop game. He stood for 1 trial of 22:30 minutes with only intermittent support of RW with supervision while playing the game. Pt tends to shift his weight to his LLE due to increased pain with RLE weightbearing, however pt receptive to cues and encouragement to attempt to promote equal BLE weightbearing as tolerated. IADL Daily Assessment     Pt education was provided on use of walker basket to safely carry and transport items during ambulation with a RW. Pt ambulated ~50 feet in the therapy gym to work on picking up 10 small items from various heights and surfaces. Additional education also provided on using a reacher in order to  items from the floor. Pt was able to safely bend to reach to  items ~14\" from the floor. MOBILITY/TRANSFERS Daily Assessment     Functional Transfers  Tub or Shower Type: Tub  Amount of Assistance Required: 5 (Stand-by assistance)  Adaptive Equipment: Tub transfer bench;Walker (comment)   Pt education provided on use of tub transfer bench for safe transfers in and out of tub/shower as pt has a tub/shower setup at home. Following education and demonstration, pt was able to perform stand step transfer to edge of bench using RW and complete transfer into and out of the tub with supervision and verbal cues for safe hand placement. Pt participated in repetitive sit <> stand transfer training w/c <> RW in order to increase pt's safety and independence to carryover to ADLs and functional mobility. He performed x 10 transfer with minimal cues required for reaching back to w/c prior to sitting down. Pt also states that he does not own a BSC at home, therefore he worked on performed sit <> stand transfers without use of arm rests from w/c.  He utilized B hands on B knees and initially required Min A for balance with sit to stand, however did progress to [id number] for the 3rd and final trial.      Assessment: Pt receptive to all education provided in today's treatment sessions. He demonstrates increased independence with performing functional transfers at a SBA level today utilizing RW. Plan of Care: Continue POC to maximize pt independence and safety performing ADLs and functional transfers/mobility.      Rosalinda Lovell, OTR  4/24/2017

## 2017-04-24 NOTE — PROGRESS NOTES
Problem: Mobility Impaired (Adult and Pediatric)  Goal: *Acute Goals and Plan of Care (Insert Text)  Physical Therapy Goals  Short Term Goals  Initiated 4/24/2017 and to be accomplished within 7 day(s) 5/1/17  1. Patient will move from supine to sit and sit to supine in bed with modified independence. 2. Patient will transfer from bed to chair and chair to bed with supervision/set-up using the least restrictive device. 3. Patient will perform sit to stand with supervision/set-up. 4. Patient will ambulate with supervision/set-up for 100 feet with the least restrictive device. 5. Patient will ascend/descend 4 stairs with one handrail(s) with supervision/set-up. Long Term Goals  Initiated 4/24/2017 and to be accomplished within 14 day(s) 5/8/17  1. Patient will move from supine to sit and sit to supine in bed with independence. 2. Patient will transfer from bed to chair and chair to bed with modified independence using the least restrictive device. 3. Patient will perform sit to stand with modified independence. 4. Patient will ambulate with modified independence for 150 feet with the least restrictive device. 5. Patient will ascend/descend 4 stairs with one handrail(s) with modified independence. PHYSICAL THERAPY EXAMINATION     Patient Name: Diana Vargas  Patient Age: 59 y.o.   Past Medical History:   Past Medical History:   Diagnosis Date    Acute blood loss as cause of postoperative anemia 4/4/2017    Anticoagulated on Coumadin      Aortoiliac occlusive disease ([address] 1/25/2017    Atherosclerosis of native artery of both lower extremities with intermittent claudication ([address] 1/25/2017    Benign hypertensive heart disease with systolic congestive heart failure, NYHA class 2 ([address] 1/26/2015    Carotid artery disease (HCC)      Chronic atrial fibrillation (HCC)      Chronic systolic heart failure (HCC)      Chronic ulcer of right foot ([address] 4/4/2017    Coronary artery disease involving native coronary artery of native heart 3/15/2017     Successful stenting of Cx (Xience LESLEY) and RCA (Xience LESLEY) to 0% by Dr. Domonique Puente on 3/15/17.  DDD (degenerative disc disease), lumbar 2015    Dyslipidemia      Erectile dysfunction 2016    Euthyroid sick syndrome 2017    Hereditary peripheral neuropathy 11/15/2016    History of cardioversion 2017     S/P Synchronized external cardioversion (2017 - Dr. Anand Massey)    Hyperuricemia      Ischemic cardiomyopathy      Peripheral artery disease ([facility] 2017    Spinal stenosis of lumbar region with radiculopathy 2015     Dr. Lyn Sousa Vitamin D deficiency 2017     Vitamin D 25-Hydroxy (2017) = 12.0   Time in: 1130  Time out: 1230  Patient seen for: initial evaluation, transfers, bed mobility, gait training, step training. Pain at start of tx: no pain reported   Pain at stop of tx: 8/10 during ambulation without RW    Time in: 1500  Time out: 1530  Patient seen for: group treatment, balance dynamic. Pain: 6/10; pain medication received from nurse prior to treatment.       Patient identified with name and :Yes     Medical Diagnosis:  Debility   Status post femoral-popliteal bypass surgery Status post femoral-popliteal bypass surgery          Therapy Diagnosis:   Difficulty with bed mobility  [X]     Difficulty with functional transfers  [X]     Difficulty with ambulation  [X]     Difficulty with stair negotiations  [X]        Problem List:    Decreased strength R LE  [X]     Decreased strength trunk/core  [ ]     Decreased RLE AROM   [X]     Decreased PROM  [ ]    Decreased endurance  [X]     Decreased balance sitting  [ ]     Decreased balance standing  [X]     Pain   [X]     Slow ambulation velocity  [X]    Decreased coordination  [X]    Decreased safety awareness  [X]       Functional Limitations:   Decreased independence with bed mobility  [X]     Decreased independence with functional transfers  [X] Decreased independence with ambulation  [X]     Decreased independence with stair negotiation  [X]        Previous Functional Level: Independent living alone and ambulating without AD; no fall reported. Home Environment: Home Environment: Private residence  # Steps to Enter: 3 with right side handrail  One/Two Story Residence: One story  Living Alone: Yes  Support Systems: Child(mile)  Patient Expects to be Discharged to[de-identified] Private residence  Current DME Used/Available at Home: None             Outcome Measures: FIM                 MMT Initial Assessment    Right Lower Extremity Left Lower Extremity   Hip Flexion 3+ 4   Knee Extension 4- 4   Knee Flexion 4- 4   Ankle Dorsiflexion 3 4   0/5       No palpable muscle contraction  1/5       Palpable muscle contraction, no joint movement  2-/5      Less than full range of motion in gravity eliminated position  2/5       Able to complete full range of motion in gravity eliminated position  2+/5     Able to initiate movement against gravity  3-/5      More than half but not full range of motion against gravity  3/5       Able to complete full range of motion against gravity  3+/5     Completes full range of motion against gravity with minimal resistance  4-/5      Completes full range of motion against gravity with minimal-moderate resistance  4/5       Completes full range of motion against gravity with moderate resistance  4+/5     Completes full range of motion against gravity with moderate-maximum resistance  5/5       Completes full range of motion against gravity with maximum resistance                 AROM: Generally decreased RLE but functional.       FIM SCORES Initial Assessment   Bed/Chair/Wheelchair Transfers Transfer Type:  Other  Other: stand step without AD  Transfer Assistance : 4 (Contact guard assistance)  Sit to Stand Assistance: Contact guard assistance   Wheelchair Mobility Able to Propel (ft): 300 feet  Functional Level: 6  Curbs/Ramps Assist Required (FIM Score): 1 (Total assistance) - NT  Wheelchair Setup Assist Required : 6 (Modified independent)  Wheelchair Management: Manages left brake, Manages right brake   Walking [place] 4 (Contact guard assistance) - 25 ft (FIM 1)   Steps/Stairs Steps/Stairs Ambulated (#): 4  Level of Assist : 4 (Contact guard assistance) (FIM - 2)  Rail Use: Both   PRIMARY MODE OF LOCOMOTION: Ambulation   Please see C Interdisciplinary Eval: Coordination/Balance Section for details regarding FIM score description. BED/CHAIR/WHEELCHAIR TRANSFERS Initial Assessment   Rolling Right 5 (Supervision) for safety near edge of bed   Rolling Left 5 (Supervision)  for safety near edge of bed   Supine to Sit 5 (Supervision) for safety; minimal verbal cues to slow down for safety. Sit to Stand Contact guard assistance for safety; without walker: patient uses upper extremity support; minimal verbal cues for increased anterior weight shift; with rolling: walker similar guarding and cues plus moderate verbal cues for upper extremity positioning on seat not walker. Stand to sit: contact guard for safety; without walker: moderate cues for slowed speed; uses upper extremities on seat for support to descend. with walker: moderate verbal cues for upper extremity positioning off walker; moderate verbal cues for coming closer to seat before sitting as well as slowing speed for safety. Sit to Supine 5 (Supervision) for safety; minimal verbal cues to slow down for safety. Transfer Assist Score Transfer Type: Other  Other: stand step without AD  Transfer Assistance : 4 (Contact guard assistance)     Transfer Type Other - stand step without AD and with AD   Comments contact guard for safety; see sit->stand for details; without walker: antalgic pattern, cues to increase right knee extension for increased right weight bearing to equalize weight bearing.  With walker: similar cues and guarding plus moderate verbal cues to keeping walker close and slow speed for safety. Car Transfer NT   Car Type N/A          WHEELCHAIR MOBILITY/MANAGEMENT Initial Assessment   Able to Propel 300 feet   Functional Level 6 - Mod I   Curbs/ramps assistance required 1 (Total assistance) - NT   Wheelchair set up assistance required 6 (Modified independent)   Wheelchair management Manages left brake, Manages right brake; propels with bilateral upper extremities. WALKING INDEPENDENCE Initial Assessment   Assistive device Walker, rolling   Ambulation assistance - level surface 4 (Contact guard assistance)   Distance 25 Feet (ft)   Functional Level 1   Comments For safety; moderate verbal cues for keeping walker close, increased left step to promote equal step length and increased right LE weight bearing. Patient presents with decreased velocity, antalgic gait, decreased right step length, decreased hip and knee extension on right side. Ambulation assistance - unlevel surface NT          STEPS/STAIRS Initial Assessment   Steps/Stairs ambulated Steps/Stairs Ambulated (#): 4  Level of Assist : 4 (Contact guard assistance)  Rail Use: Both   Rail Use Both   Functional Level 2   Comments For safety; minimal verbal cues for LE progression up with left and down with right; patient demonstrates decreased velocity, step to step pattern with increased use of upper extremities on handrails especially with right side weight bearing. Curbs/Ramps NT   Balance Sitting stat and dynamic: good without support, no sway, supervision for safety; patient able to reach to floor with upper unilateral upper extremities with no loss of balance. Standing static: good without support; decreased RLE weight bearing. Contact guard for safety  Standing dynamic: good with intermittent UE support on rolling walker; two trials of standing with unilateral underhand throwing to challenge balance; 10 minutes, and 6 minutes.  Moderate verbal cues for equal LE positioning to increase right side weight bearing to equalize weight bearing. Contact guard for safety; minimal sway. Exercise:   RLE Quad sets 2 sets of 10: achieves full extension, minimal cues for technique and 6 second hold at end range  SLR : Bilateral LE's; 2 sets of 10 with minimal cues for technique and decreased speed for control and increased musculature engagement. PHYSICAL THERAPY PLAN OF CARE     Therapy Diagnosis:   Please see table above   Patient is a pleasant 58 yo M presenting with decreased strength of right lower extremity and increased pain in right lower extremity resulting in decreased independence with bed mobility, transfers, ambulation, and step negotiation. Order received from MD for physical therapy services and chart reviewed. Pt to be seen 5 times per week for 3 hours of total therapy per day for 2 weeks. Thank you for the referral.     Please see Care Plan for STG's and LTG's       Pt would benefit from skilled physical therapy in order to improve independent functional mobility within the home. Interventions may include range of motion (AROM, PROM B LE/trunk), motor function (B LE/trunk strengthening/coordination), activity tolerance (vitals, oxygen saturation levels), bed mobility training, balance activities, gait training (progressive ambulation program), and functional transfer training. Please see IRC; Interdisciplinary Eval, Care Plan, and Patient Education for further information regarding physical therapy examination and plan of care.       Indiana Hernandez, SPT  4/24/2017

## 2017-04-24 NOTE — PROGRESS NOTES
SHIFT CHANGE NOTE FOR [facility]    Bedside and Verbal shift change report given to Amilcar Gaitan LPN oncoming nurse) by Stephany Cabot, RN   (offgoing nurse). Report included the following information SBAR, Kardex, MAR and Recent Results.     Situation:   Code Status: Full Code   Reason for Admission: [address] Day: 3   Problem List:   Hospital Problems  Date Reviewed: 4/23/2017          Codes Class Noted POA    Vitamin D deficiency (Chronic) ICD-10-CM: E55.9  ICD-9-CM: 268.9  4/22/2017 Yes    Overview Signed 4/22/2017 12:56 PM by Jayne Lennon MD     Vitamin D 25-Hydroxy (4/22/2017) = 12.0             Anticoagulated on Coumadin (Chronic) ICD-10-CM: Z51.81, Z79.01  ICD-9-CM: V58.83, V58.61  Unknown Yes        Hyperuricemia (Chronic) ICD-10-CM: E79.0  ICD-9-CM: 790.6  Unknown Yes        Aftercare following surgery of the circulatory system ICD-10-CM: I58.795  ICD-9-CM: V58.73  4/21/2017 Yes    Overview Signed 4/21/2017  2:32 PM by Jayne Lennon MD     S/P Right axillary to bifemoral artery bypass using an 8 mm Propaten graft from the right axilla to the right common femoral artery with a jump femoral-femoral bypass with another 8 mm Propaten external ring bypass; Right common femoral artery, and profunda femoris artery and superficial femoral artery endarterectomy causing an extensive surgery on the right side (4/4/2017 - Dr. Pop Urbina)    S/P Cutdown of femoral-femoral bypass, more on the left groin side; Right lower extremity angiography with first-order catheterization (4/7/2017 - Dr. Pop Urbina)    S/P Right femoral to above-knee popliteal bypass with an 8 mm PTFE graft (4/10/2017 - Dr. Land)             * (Principal)Status post femoral-popliteal bypass surgery ICD-10-CM: Z95.828  ICD-9-CM: V45.89  4/21/2017 Yes    Overview Signed 4/21/2017  2:33 PM by Jayne Lennon MD     S/P Right axillary to bifemoral artery bypass using an 8 mm Propaten graft from the right axilla to the right common femoral artery with a jump femoral-femoral bypass with another 8 mm Propaten external ring bypass; Right common femoral artery, and profunda femoris artery and superficial femoral artery endarterectomy causing an extensive surgery on the right side (4/4/2017 - Dr. Estelle Brannon)    S/P Cutdown of femoral-femoral bypass, more on the left groin side; Right lower extremity angiography with first-order catheterization (4/7/2017 - Dr. Estelle Brannon)    S/P Right femoral to above-knee popliteal bypass with an 8 mm PTFE graft (4/10/2017 - Dr. Dhaval Pierre)             History of cardioversion ICD-10-CM: Z98.890  ICD-9-CM: V15.1  4/18/2017 Yes    Overview Signed 4/21/2017  2:34 PM by Dina Centeno MD     S/P Synchronized external cardioversion (4/18/2017 - Dr. Anay Miller)             Chronic atrial fibrillation ([facility] (Chronic) ICD-10-CM: P96.8  ICD-9-CM: 427.31  Unknown Yes        Critical ischemia of lower extremity ICD-10-CM: I99.8  ICD-9-CM: 459.9  4/10/2017 No    Overview Signed 4/21/2017  4:41 PM by Dina Centeno MD     Right lower limb             Euthyroid sick syndrome ICD-10-CM: E07.81  ICD-9-CM: 790.94  4/6/2017 Yes        Acute blood loss as cause of postoperative anemia ICD-10-CM: D62  ICD-9-CM: 285.1  4/4/2017 Yes        Impaired mobility and ADLs ICD-10-CM: Z74.09  ICD-9-CM: 799.89  4/4/2017 Yes        Chronic ulcer of right foot (HCC) (Chronic) ICD-10-CM: L97.519  ICD-9-CM: 707.15  4/4/2017 Yes        Aortoiliac occlusive disease ([facility] (Chronic) ICD-10-CM: G29.99  ICD-9-CM: 444.09  1/25/2017 Yes        Atherosclerosis of native artery of both lower extremities with intermittent claudication (HCC) (Chronic) ICD-10-CM: X83.999  ICD-9-CM: 440.21  1/25/2017 Yes        Peripheral artery disease (HCC) (Chronic) ICD-10-CM: I73.9  ICD-9-CM: 443.9  1/25/2017 Yes        Benign hypertensive heart disease with systolic congestive heart failure, NYHA class 2 (HCC) (Chronic) ICD-10-CM: I11.0, I50.20  ICD-9-CM: 402.11, 428.20, 428.0  1/26/2015 Yes              Background:   Past Medical History:   Past Medical History:   Diagnosis Date    Acute blood loss as cause of postoperative anemia 4/4/2017    Anticoagulated on Coumadin     Aort[facility]iliac occlusive disease ([facility] 1/25/2017    Atherosclerosis of native artery of both lower extremities with intermittent claudication ([facility] 1/25/2017    Benign hypertensive heart disease with systolic congestive heart failure, NYHA class 2 ([facility] 1/26/2015    Carotid artery disease (HCC)     Chronic atrial fibrillation (HCC)     Chronic systolic heart failure (HCC)     Chronic ulcer of right foot ([facility] 4/4/2017    Coronary artery disease involving native coronary artery of native heart 3/15/2017    Successful stenting of Cx (Xience LESLEY) and RCA (Xience LESLEY) to 0% by Dr. Lizeth Pinto on 3/15/17.     DDD (degenerative disc disease), lumbar 1/26/2015    Dyslipidemia     Erectile dysfunction 7/5/2016    Euthyroid sick syndrome 4/6/2017    Hereditary peripheral neuropathy 11/15/2016    History of cardioversion 4/18/2017    S/P Synchronized external cardioversion (4/18/2017 - Dr. Shiraz Ramey)    Hyperuricemia     Ischemic cardiomyopathy     Peripheral artery disease ([facility] 1/25/2017    Spinal stenosis of lumbar region with radiculopathy 5/4/2015    Dr. Marie Gamino Vitamin D deficiency 4/22/2017    Vitamin D 25-Hydroxy (4/22/2017) = 12.0      Patient taking anticoagulants YES   Patient has a defibrillator: no     Assessment:   Changes in Assessment throughout shift: NONE                     Last Vitals:     Vitals:    04/23/17 1622 04/23/17 2002 04/23/17 2227 04/24/17 0620   BP: 101/65 107/61 122/60 120/62   Pulse: 80 72 70 67   Resp: 18 20     Temp: 97.5 °F (36.4 °C) 97.7 °F (36.5 °C)     SpO2: 98% 100%          PAIN    Pain Assessment    Pain Intensity 1: 1 (04/24/17 0530) Pain Intensity 1: 2 (12/29/14 1105)    Pain Location 1: Foot Pain Location 1: Abdomen    Pain Intervention(s) 1: Medication (see MAR) Pain Intervention(s) 1: Medication (see MAR)  Patient Stated Pain Goal: 0 Patient Stated Pain Goal: 0  o Intervention effective: YES   o Other actions taken for pain: MEDICATION     Skin Assessment  Skin color Skin Color: Appropriate for ethnicity  Condition/Temperature Skin Condition/Temp: Dry, Warm  Integrity Skin Integrity: Incision (comment)  Turgor Turgor: Non-tenting  Weekly Pressure Ulcer Documentation Weekly Pressure Ulcer Documentation: No Pressure Ulcer Noted-Pressure Ulcer Prevention Initiated  Wound Prevention & Protection Methods  Orientation of wound Orientation of Wound Prevention: Posterior  Location of Prevention Location of Wound Prevention: Sacrum/Coccyx  Dressing Present Dressing Present : Yes  Dressing Status Dressing Status: Intact  Wound Offloading Wound Offloading (Prevention Methods): Bed, pressure redistribution/air     INTAKE/OUPUT    Date 04/23/17 0700 - 04/24/17 0659 04/24/17 0700 - 04/25/17 0659   Shift 2630-5659 5535-1012 24 Hour Total 1188-8372 2710-1919 24 Hour Total   I  N  T  A  K  E   Shift Total         O  U  T  P  U  T   Urine 1025 2400 3425         Urine Voided 1025 2400 3425         Urine Occurrence(s) 2 x  2 x       Stool 0 0 0         Stool Occurrence(s) 0 x 0 x 0 x         Stool 0 0 0       Shift Total 1025 2400 3425      NET -1025 -2400 -3425      Weight (kg)             Recommendations:  1. Patient needs and requests: TOILETING    2. Diet: CARDIAC REG    3. Pending tests/procedures: LABS    4. Functional Level/Equipment: wheelchair    5. Estimated Discharge Date: TBD Posted on Whiteboard in Patients Room: [facility] Safety Check    A safety check occurred in the patient's room between off going nurse and oncoming nurse listed above.     The safety check included the below items  Area Items   H  High Alert Medications - Verify all high alert medication drips (heparin, PCA, etc.)   E  Equipment - Suction is set up for ALL patients (with cailin)  - Red plugs utilized for all equipment (IV pumps, etc.)  - WOWs wiped down at end of shift.  - Room stocked with oxygen, suction, and other unit-specific supplies   A  Alarms - Bed alarm is set for fall risk patients  - Ensure chair alarm is in place and activated if patient is up in a chair   L  Lines - Check IV for any infiltration  - Jiang bag is empty if patient has a Jiang   - Tubing and IV bags are labeled   S  Safety   - Room is clean, patient is clean, and equipment is clean. - Hallways are clear from equipment besides carts. - Fall bracelet on for fall risk patients  - Ensure room is clear and free of clutter  - Suction is set up for ALL patients (with cailin)  - Hallways are clear from equipment besides carts.    - Isolation precautions followed, supplies available outside room, sign posted

## 2017-04-24 NOTE — WOUND CARE
Physical Exam   Nursing to continue daily betadine painting of eschar wounds. Recommend podiatry for debridement. Compression with gradual increase as patient can tolerate to decrease swelling. Nursing to notify wound care of deterioration or change in status.   Annette Banks RN

## 2017-04-24 NOTE — INTERDISCIPLINARY ROUNDS
[facility] PHYSICAL REHABILITATION  [address] [place] [address], [address]    INPATIENT REHABILITATION  TEAM CONFERENCE SUMMARY     Date of Conference: 4/25/2017    Name: Anson King Age / Sex: 59 y.o. / male   CSN: [id number] MRN: [id number]   [facility] Date: 4/21/2017 Length of Stay: 3 days     Primary Rehabilitation Diagnosis  1. Impaired Mobility and ADLs  2. S/P Right axillary to bifemoral artery bypass using an 8 mm Propaten graft from the right axilla to the right common femoral artery with a jump femoral-femoral bypass with another 8 mm Propaten external ring bypass; Right common femoral artery, and profunda femoris artery and superficial femoral artery endarterectomy causing an extensive surgery on the right side (4/4/2017 - Dr. Jennifer Kent); S/P Cutdown of femoral-femoral bypass, more on the left groin side; Right lower extremity angiography with first-order catheterization (4/7/2017 - Dr. Jennifer Kent); S/P Right femoral to above-knee popliteal bypass with an 8 mm PTFE graft (4/10/2017 - Dr. Marline Lizarraga)  3.  History of critical ischemia of the right lower limb secondary to peripheral arterial disease      Comorbidities   Benign hypertensive heart disease with systolic congestive heart failure, NYHA class 2     History of vitamin B12 deficiency    DDD (degenerative disc disease), lumbar    Erectile dysfunction    Spinal stenosis of lumbar region with radiculopathy    Hereditary peripheral neuropathy    Aortoiliac occlusive disease     Atherosclerosis of native artery of both lower extremities with intermittent claudication     Peripheral artery disease     Coronary artery disease involving native coronary artery of native heart    Chronic atrial fibrillation     History of transient alteration of awareness, resolved    Acute blood loss as cause of postoperative anemia    Anticoagulated on Coumadin    Ischemic cardiomyopathy    Chronic systolic heart failure  Carotid artery disease     Dyslipidemia    History of hyperuricemia    Euthyroid sick syndrome    Aftercare following surgery of the circulatory system    History of cardioversion    Chronic ulcers of right foot    Vitamin D deficiency         Therapy:     FIM SCORES Initial Assessment Weekly Progress Assessment 4/24/2017   Eating Functional Level: 7  7   Swallowing     Grooming 7  7   Bathing 2 (Pt stood in shower at PLOF)  4 - Min A      Upper Body Dressing Functional Level: 5  Items Applied/Steps Completed: Pullover (4 steps)  Comments: Setup only  5 - Setup   Lower Body Dressing Functional Level: 4  Items Applied/Steps Completed: Elastic waist pants (3 steps), Sock, left (1 step), Sock, right (1 step)  Comments: Pt is able to thread LEs into shorts while seated and stands with close supervision to manage them over buttocks. He was able to cross LEs to manage socks however did require Min A for donning R sock completely due to edema in R foot, tighter fit of the sock, and increased pain due to wounds on his foot. 4 - Min A   Toileting Functional Level: 4  Comments: Based on performance of LB bathing and dressing tasks, pt able to perform clothing management and hygiene with close supervision for safety. 5 - Close supervision   Bladder - level of assist 4 5   Bladder - accident frequency score 5 5   Bowel - level of assist 5 4   Bowel - accident frequency score 5     Toilet Transfer [place] Toilet Transfer Score: 4  Comments: Based on other functional transfers and functional moblity within the room, pt able to perform stand step transfer to/from [facility] utilizing RW with CGA for safety and verbal cues to implement proper hand placement during sit <> stand transitions.   4 - CGA   Tub/Shower Transfer [place] Tub/Shower Transfer Score: 4  Comments: Pt performed stand step transfer to/from tub bench utilizing RW with CGA for safety and verbal cues for proper hand placement on chair and walker during sit <> stand transitions. 4 - CGA      Comprehension Primary Mode of Comprehension: Auditory  Score: 5 Auditory  5   Expression Primary Mode of Expression: Verbal  Score: 4 Verbal  5   Social Interaction Score: 5 6   Problem Solving Score: 4 4   Memory Score: 4 5     FIM SCORES Initial Assessment Weekly Progress Assessment 4/24/2017   Bed/Chair/Wheelchair Transfers       Bed Mobility Rolling Right  supervision    Rolling Left  supervision    Supine to Sit  supervision    Sit to Stand Contact guard   Sit to Supine  supervision     Rolling Right   supervision   Rolling Left   Supervision   Supine to Sit   supervision   Sit to Stand   Contact guard   Sit to Supine   Supervision       Locomotion (W/C)   Function   mod I  Setup Assistance - Mod I     mod I     Locomotion (W/C distance)   300ft  300ft   Locomotion (Walk)   contact guard   contact guard      Locomotion (Walk dist.)  25 ft  25ft   Steps/Stairs   4 with contact guard  4 with contact guard         Nursing:     Neuro:   A&O x___4_                   Respiratory:   [x] WNL   [] O2   [] LPM ______   Other:  Peripheral Vascular:   [] TEDS present   [x] Edema present ____ Grade   Cardiac:   [] WNL   [x] Other  Genitourinary:   [x] continent   [] incontinent   [] lopez  Abdominal _____4/24/17__ LBM  GI: ____cardiac___ Diet ___thin__ Liquids _____ tube feeds  Musculoskeletal: ____ ROM Transfers ____w/c_ Assistive Device Used  ___min_ Level of Assistance  Skin Integumentary:   [] Intact   [x] Not Intact   __________Preventative Measures  Details______________________________________________________________  Pain: [x] Controlled   [] Not Controlled   Pain Meds:   [] Scheduled   [x] PRN        Registered Dietitian / Nutrition:   Patient Vitals for the past 100 hrs:   % Diet Eaten   04/24/17 0930 100 %   04/22/17 1356 75 %   04/22/17 0900 75 %     Patient reported appetite and meal intake remain good.  Pt started on coumadin; provided educational handout and reviewed vitamin K consistent diet with pt. Pt verbalized understanding    Supplements:          [x] Yes: Ensure Enlive TID   [] No      Amount of supplement consumed:  100%      Intake/Output Summary (Last 24 hours) at 04/24/17 1058  Last data filed at 04/24/17 0930   Gross per 24 hour   Intake              240 ml   Output             2850 ml   Net            -2610 ml                                Last bowel movement: 4/22      Interdisciplinary Team Goals:     1. Goal  Pt will perform LB dressing utilizing AE as needed with supervision. Barrier Increased pain, LE edema, Decreased strength    Intervention ADL/AE training, Therapeutic activity, Therapeutic exercise, Patient education. 2. Goal  Patient will perform sit<->stands with RW with appropriate upper extremity positioning without verbal cues. Barrier  decreased right lower extremity strength, decreased attention    Intervention therapeutic activity, therapeutic exercise     3. Goal      Barrier      Intervention       4. Goal  Nursing:Current incision /wound will remain free of infection by discharge    Barrier      Intervention  daily wound care     5. Goal  Po intake of meals will continue to meet >75% of patient estimated nutritional needs within the next 7 days. Outcome:  [x] Met/Ongoing    []  Not Met    [] New/Initial Goal     Barrier  none known    Intervention  modified diet; nutrition supplements     6. Goal  Maximize attention to task with limited impulsivity    Barrier  Variable attention and impulsivity    Intervention  Cues, prompts, redirection        Disposition / Discharge Planning:      Follow-up therapy services:  tbd   DME recommendations:  tbd   Estimated discharge date:  tbd   Discharge Location:  tbd         Electronic Signatures:      Signature Date Signed   Physical Therapist    JERMAINE Rivera, PT, DPT 4/24/17 4/24/17   Occupational Therapist    Najma Vera 77.  4/24/2017   Speech Therapist Recreational Therapist    Mindi Rinne, [place] 4/24/17   Nursing    Betzaida Oneill LPN  [birth year]   Dietitian    Amanda Blankenship RD  4/24/17   Clinical Psychologist    Gela Newberry, Ph.D. 4/24/17    Physician    Delroy Koyanagi. Mikayla Alex MD  4/24/2017       Lula Kaur, [facility]  4/24/17         The above information has been reviewed with the patient in a language that they can understand. Opportunity for comments and questions has been provided and a signed attestation has been scanned into the \"media tab\" of the EMR.       Patient Signature: ______________________________________________________    Date Signed: __________________________________________________________

## 2017-04-24 NOTE — PROGRESS NOTES
SHIFT CHANGE NOTE FOR [facility]    Bedside and Verbal shift change report given to [facility] RN oncoming nurse) by Naomi Harris LPN   (offgoing nurse). Report included the following information SBAR, Kardex, MAR and Recent Results.     Situation:   Code Status: Full Code   Reason for Admission: [address] Day: 3   Problem List:   Hospital Problems  Date Reviewed: 4/24/2017          Codes Class Noted POA    Vitamin D deficiency (Chronic) ICD-10-CM: E55.9  ICD-9-CM: 268.9  4/22/2017 Yes    Overview Signed 4/22/2017 12:56 PM by Jame Burkitt, MD     Vitamin D 25-Hydroxy (4/22/2017) = 12.0             Anticoagulated on Coumadin (Chronic) ICD-10-CM: Z51.81, Z79.01  ICD-9-CM: V58.83, V58.61  Unknown Yes        Hyperuricemia (Chronic) ICD-10-CM: E79.0  ICD-9-CM: 790.6  Unknown Yes        Aftercare following surgery of the circulatory system ICD-10-CM: B55.493  ICD-9-CM: V58.73  4/21/2017 Yes    Overview Signed 4/21/2017  2:32 PM by Jame Burkitt, MD     S/P Right axillary to bifemoral artery bypass using an 8 mm Propaten graft from the right axilla to the right common femoral artery with a jump femoral-femoral bypass with another 8 mm Propaten external ring bypass; Right common femoral artery, and profunda femoris artery and superficial femoral artery endarterectomy causing an extensive surgery on the right side (4/4/2017 - Dr. Atul Kirkland)    S/P Cutdown of femoral-femoral bypass, more on the left groin side; Right lower extremity angiography with first-order catheterization (4/7/2017 - Dr. Atul Kirkland)    S/P Right femoral to above-knee popliteal bypass with an 8 mm PTFE graft (4/10/2017 - Dr. Swapna Velasquez)             * (Principal)Status post femoral-popliteal bypass surgery ICD-10-CM: Z95.828  ICD-9-CM: V45.89  4/21/2017 Yes    Overview Signed 4/21/2017  2:33 PM by Jame Burkitt, MD     S/P Right axillary to bifemoral artery bypass using an 8 mm Propaten graft from the right axilla to the right common femoral artery with a jump femoral-femoral bypass with another 8 mm Propaten external ring bypass; Right common femoral artery, and profunda femoris artery and superficial femoral artery endarterectomy causing an extensive surgery on the right side (4/4/2017 - Dr. Starlett Lefort)    S/P Cutdown of femoral-femoral bypass, more on the left groin side; Right lower extremity angiography with first-order catheterization (4/7/2017 - Dr. Starlett Lefort)    S/P Right femoral to above-knee popliteal bypass with an 8 mm PTFE graft (4/10/2017 - Dr. Tae Bowen)             History of cardioversion ICD-10-CM: Z98.890  ICD-9-CM: V15.1  4/18/2017 Yes    Overview Signed 4/21/2017  2:34 PM by Ena Peterson MD     S/P Synchronized external cardioversion (4/18/2017 - Dr. Debi Pardo)             Chronic atrial fibrillation ([facility] (Chronic) ICD-10-CM: E21.9  ICD-9-CM: 427.31  Unknown Yes        Critical ischemia of lower extremity ICD-10-CM: I99.8  ICD-9-CM: 459.9  4/10/2017 No    Overview Signed 4/21/2017  4:41 PM by Ena Peterson MD     Right lower limb             Euthyroid sick syndrome ICD-10-CM: E07.81  ICD-9-CM: 790.94  4/6/2017 Yes        Acute blood loss as cause of postoperative anemia ICD-10-CM: D62  ICD-9-CM: 285.1  4/4/2017 Yes        Impaired mobility and ADLs ICD-10-CM: Z74.09  ICD-9-CM: 799.89  4/4/2017 Yes        Chronic ulcer of right foot (HCC) (Chronic) ICD-10-CM: L97.519  ICD-9-CM: 707.15  4/4/2017 Yes        Aortoiliac occlusive disease ([facility] (Chronic) ICD-10-CM: R66.02  ICD-9-CM: 444.09  1/25/2017 Yes        Atherosclerosis of native artery of both lower extremities with intermittent claudication (HCC) (Chronic) ICD-10-CM: Y15.192  ICD-9-CM: 440.21  1/25/2017 Yes        Peripheral artery disease (HCC) (Chronic) ICD-10-CM: I73.9  ICD-9-CM: 443.9  1/25/2017 Yes        Benign hypertensive heart disease with systolic congestive heart failure, NYHA class 2 (HCC) (Chronic) ICD-10-CM: I11.0, I50.20  ICD-9-CM: 402.11, 428.20, 428.0  1/26/2015 Yes              Background:   Past Medical History:   Past Medical History:   Diagnosis Date    Acute blood loss as cause of postoperative anemia 4/4/2017    Anticoagulated on Coumadin     [facility]iliac occlusive disease ([facility] 1/25/2017    Atherosclerosis of native artery of both lower extremities with intermittent claudication ([facility] 1/25/2017    Benign hypertensive heart disease with systolic congestive heart failure, NYHA class 2 ([facility] 1/26/2015    Carotid artery disease (HCC)     Chronic atrial fibrillation (HCC)     Chronic systolic heart failure (HCC)     Chronic ulcer of right foot ([facility] 4/4/2017    Coronary artery disease involving native coronary artery of native heart 3/15/2017    Successful stenting of Cx (Xience LESLEY) and RCA (Xience LESLEY) to 0% by Dr. Brain Nick on 3/15/17.     DDD (degenerative disc disease), lumbar 1/26/2015    Dyslipidemia     Erectile dysfunction 7/5/2016    Euthyroid sick syndrome 4/6/2017    Hereditary peripheral neuropathy 11/15/2016    History of cardioversion 4/18/2017    S/P Synchronized external cardioversion (4/18/2017 - Dr. Reji Su)    Hyperuricemia     Ischemic cardiomyopathy     Peripheral artery disease ([facility] 1/25/2017    Spinal stenosis of lumbar region with radiculopathy 5/4/2015    Dr. Abby Conn Vitamin D deficiency 4/22/2017    Vitamin D 25-Hydroxy (4/22/2017) = 12.0      Patient taking anticoagulants YES   Patient has a defibrillator: no     Assessment:   Changes in Assessment throughout shift: NONE                     Last Vitals:     Vitals:    04/24/17 0620 04/24/17 0734 04/24/17 1234 04/24/17 1616   BP: 120/62 125/66 125/67 109/57   Pulse: 67 61 68 72   Resp:  20  18   Temp:  97.6 °F (36.4 °C)  98.1 °F (36.7 °C)   SpO2:  98%  100%        PAIN    Pain Assessment    Pain Intensity 1: 5 (04/24/17 1530) Pain Intensity 1: 2 (12/29/14 1105)    Pain Location 1: Foot Pain Location 1: Abdomen    Pain Intervention(s) 1: Medication (see MAR) Pain Intervention(s) 1: Medication (see MAR)  Patient Stated Pain Goal: 0 Patient Stated Pain Goal: 0  o Intervention effective: YES   o Other actions taken for pain: MEDICATION     Skin Assessment  Skin color Skin Color: Appropriate for ethnicity  Condition/Temperature Skin Condition/Temp: Flaky, Warm  Integrity Skin Integrity: Incision (comment)  Turgor Turgor: Non-tenting  Weekly Pressure Ulcer Documentation Weekly Pressure Ulcer Documentation: No Pressure Ulcer Noted-Pressure Ulcer Prevention Initiated  Wound Prevention & Protection Methods  Orientation of wound Orientation of Wound Prevention: Posterior  Location of Prevention Location of Wound Prevention: Buttocks, Sacrum/Coccyx  Dressing Present Dressing Present : Yes  Dressing Status Dressing Status: Intact  Wound Offloading Wound Offloading (Prevention Methods): Bed, pressure redistribution/air     INTAKE/OUPUT    Date 04/23/17 0700 - 04/24/17 0659 04/24/17 0700 - 04/25/17 0659   Shift 2970-1639 1414-7269 24 Hour Total 3850-1048 5617-9638 24 Hour Total   I  N  T  A  K  E   P.O.    480  480      P. O.    480  480    Shift Total    480  480   O  U  T  P  U  T   Urine 1025 2400 3425 300  300      Urine Voided 1025 2400 3425 300  300      Urine Occurrence(s) 2 x  2 x 3 x  3 x    Stool 0 0 0         Stool Occurrence(s) 0 x 0 x 0 x 2 x  2 x      Stool 0 0 0       Shift Total 1025 2400 3425 300  300   NET -1025 -2400 -3425 180  180   Weight (kg)             Recommendations:  1. Patient needs and requests: TOILETING    2. Diet: CARDIAC REG    3. Pending tests/procedures: LABS    4. Functional Level/Equipment: wheelchair    5. Estimated Discharge Date: TBD Posted on Whiteboard in Patients Room: no     HEALS Safety Check    A safety check occurred in the patient's room between off going nurse and oncoming nurse listed above.     The safety check included the below items  Area Items   H  High Alert Medications - Verify all high alert medication drips (heparin, PCA, etc.)   E  Equipment - Suction is set up for ALL patients (with cailin)  - Red plugs utilized for all equipment (IV pumps, etc.)  - WOWs wiped down at end of shift.  - Room stocked with oxygen, suction, and other unit-specific supplies   A  Alarms - Bed alarm is set for fall risk patients  - Ensure chair alarm is in place and activated if patient is up in a chair   L  Lines - Check IV for any infiltration  - Jiang bag is empty if patient has a Jiang   - Tubing and IV bags are labeled   S  Safety   - Room is clean, patient is clean, and equipment is clean. - Hallways are clear from equipment besides carts. - Fall bracelet on for fall risk patients  - Ensure room is clear and free of clutter  - Suction is set up for ALL patients (with cailin)  - Hallways are clear from equipment besides carts.    - Isolation precautions followed, supplies available outside room, sign posted

## 2017-04-24 NOTE — PROGRESS NOTES
[facility] PHYSICAL REHABILITATION  [address] [place] [address], [address]     INPATIENT REHABILITATION  DAILY PROGRESS NOTE     Date: 4/24/2017    Name: Iesha Arenas Age / Sex: 59 y.o. / male   CSN: [id number] MRN: [id number]   [facility] Date: 4/21/2017 Length of Stay: 3 days     Primary Rehab Diagnosis: Impaired Mobility and ADLs secondary to:  1. S/P Right axillary to bifemoral artery bypass using an 8 mm Propaten graft from the right axilla to the right common femoral artery with a jump femoral-femoral bypass with another 8 mm Propaten external ring bypass; Right common femoral artery, and profunda femoris artery and superficial femoral artery endarterectomy causing an extensive surgery on the right side (4/4/2017 - Dr. Kody Daugherty); S/P Cutdown of femoral-femoral bypass, more on the left groin side; Right lower extremity angiography with first-order catheterization (4/7/2017 - Dr. Kody Daugherty); S/P Right femoral to above-knee popliteal bypass with an 8 mm PTFE graft (4/10/2017 - Dr. Joe Marx)  2. History of critical ischemia of the right lower limb secondary to peripheral arterial disease      Subjective:     Patient seen and examined. Blood pressure controlled. Losartan was not given yesterday due to BP parameters.     Patient's Complaint:   No significant medical complaints    Pain Control: stable, mild-to-moderate joint symptoms intermittently, reasonably well controlled by current meds      Objective:     Vital Signs:  Patient Vitals for the past 24 hrs:   BP Temp Pulse Resp SpO2   04/24/17 0734 125/66 97.6 °F (36.4 °C) 61 20 98 %   04/24/17 0620 120/62 - 67 - -   04/23/17 2227 122/60 - 70 - -   04/23/17 2002 107/61 97.7 °F (36.5 °C) 72 20 100 %   04/23/17 1622 101/65 97.5 °F (36.4 °C) 80 18 98 %   04/23/17 1230 103/58 - 68 - -        Physical Examination:  GENERAL SURVEY: Patient is awake, alert, oriented x 3, sitting comfortably on the chair, not in acute respiratory distress. HEENT: pale palpebral conjunctivae, anicteric sclerae, no nasoaural discharge, moist oral mucosa  NECK: supple, no jugular venous distention, no palpable lymph nodes  CHEST/LUNGS: symmetrical chest expansion, good air entry, clear breath sounds  HEART: adynamic precordium, good S1 S2, no S3, regular rhythm, no murmurs  ABDOMEN: flat, bowel sounds appreciated, soft, non-tender  EXTREMITIES: (+) necrotic areas noted on plantar surface of right heel, lateral surface of right calf area and nailbase of right great toe, pale nailbeds, (+) Grade 1-2 edema on right leg, full and equal pulses, no calf tenderness   NEUROLOGICAL EXAM: The patient is awake, alert and oriented x3, able to answer questions fairly appropriately, able to follow 1 and 2 step commands. Able to tell time from the wall clock. Cranial nerves II-XII are grossly intact. No gross sensory deficit.  Motor strength is 4+/5 on BUE and the LLE, 4- to 4/5 on the RLE (except for 2-/5 on the right ankle and 1/5 on toes of right foot).     Post-op Incision(s): healing well, clean, dry, and intact      Current Medications:  Current Facility-Administered Medications   Medication Dose Route Frequency    pneumococcal 23-valent (PNEUMOVAX 23) injection 0.5 mL  0.5 mL IntraMUSCular PRIOR TO DISCHARGE    levothyroxine (SYNTHROID) tablet 50 mcg  50 mcg Oral ACB    [START ON 4/26/2017] levothyroxine (SYNTHROID) tablet 25 mcg  25 mcg Oral ACB    cholecalciferol (VITAMIN D3) capsule 5,000 Units  5,000 Units Oral DAILY    ferrous sulfate tablet 325 mg  1 Tab Oral DAILY WITH BREAKFAST    ascorbic acid (vitamin C) (VITAMIN C) tablet 250 mg  250 mg Oral DAILY WITH BREAKFAST    acetaminophen (TYLENOL) tablet 650 mg  650 mg Oral Q4H PRN    docusate sodium (COLACE) capsule 100 mg  100 mg Oral BID    bisacodyl (DULCOLAX) tablet 10 mg  10 mg Oral Q48H PRN    amiodarone (CORDARONE) tablet 400 mg  400 mg Oral BID    dilTIAZem CD (CARDIZEM CD) capsule 120 mg  120 mg Oral DAILY    oxyCODONE-acetaminophen (PERCOCET 10)  mg per tablet 1 Tab  1 Tab Oral Q4H PRN    metoprolol tartrate (LOPRESSOR) tablet 25 mg  25 mg Oral Q12H    clopidogrel (PLAVIX) tablet 75 mg  75 mg Oral DAILY WITH DINNER    aspirin chewable tablet 81 mg  81 mg Oral DAILY WITH BREAKFAST    losartan (COZAAR) tablet 25 mg  25 mg Oral DAILY    DULoxetine (CYMBALTA) capsule 60 mg  60 mg Oral DAILY    Lactobacillus Acidoph & Bulgar (FLORANEX) tablet 2 Tab  2 Tab Oral BID    [START ON 5/3/2017] amiodarone (CORDARONE) tablet 200 mg  200 mg Oral BID    WARFARIN INFORMATION NOTE (COUMADIN)   Other Q24H    gabapentin (NEURONTIN) capsule 100 mg  100 mg Oral BID    zinc sulfate (ZINCATE) capsule 220 mg  1 Cap Oral DAILY    atorvastatin (LIPITOR) tablet 40 mg  40 mg Oral QHS       Allergies:  No Known Allergies      Lab/Data Review:  Recent Results (from the past 24 hour(s))   METABOLIC PANEL, BASIC    Collection Time: 04/24/17  6:50 AM   Result Value Ref Range    Sodium 133 (L) 136 - 145 mmol/L    Potassium 4.7 3.5 - 5.5 mmol/L    Chloride 99 (L) 100 - 108 mmol/L    CO2 27 21 - 32 mmol/L    Anion gap 7 3.0 - 18 mmol/L    Glucose 90 74 - 99 mg/dL    BUN 12 7.0 - 18 MG/DL    Creatinine 0.70 0.6 - 1.3 MG/DL    BUN/Creatinine ratio 17 12 - 20      GFR est AA >60 >60 ml/min/1.73m2    GFR est non-AA >60 >60 ml/min/1.73m2    Calcium 9.0 8.5 - 10.1 MG/DL   HGB & HCT    Collection Time: 04/24/17  6:50 AM   Result Value Ref Range    HGB 8.0 (L) 13.0 - 16.0 g/dL    HCT 23.8 (L) 36.0 - 48.0 %   PROTHROMBIN TIME + INR    Collection Time: 04/24/17  6:50 AM   Result Value Ref Range    Prothrombin time 22.5 (H) 11.5 - 15.2 sec    INR 2.1 (H) 0.8 - 1.2           Estimated Glomerular Filtration Rate:  CKD-EPI:   On admission, estimated GFR was 107.9 mL/min/1.73m2 based on a Creatinine of 0.63 mg/dl. Most recent estimated GFR was 103.3 mL/min/1.73m2 based on a Creatinine of 0.70 mg/dl.     MDRD:   On admission, estimated GFR was 138.5 mL/min/1.73m2 based on a Creatinine of 0.63 mg/dl. Most recent estimated GFR was 122.7 mL/min/1.73m2 based on a Creatinine of 0.70 mg/dl. Assessment:     Primary Rehabilitation Diagnosis  1. Impaired Mobility and ADLs  2. S/P Right axillary to bifemoral artery bypass using an 8 mm Propaten graft from the right axilla to the right common femoral artery with a jump femoral-femoral bypass with another 8 mm Propaten external ring bypass; Right common femoral artery, and profunda femoris artery and superficial femoral artery endarterectomy causing an extensive surgery on the right side (4/4/2017 - Dr. Hilda Carroll); S/P Cutdown of femoral-femoral bypass, more on the left groin side; Right lower extremity angiography with first-order catheterization (4/7/2017 - Dr. Hilda Carroll); S/P Right femoral to above-knee popliteal bypass with an 8 mm PTFE graft (4/10/2017 - Dr. Haleigh Bush)  3.  History of critical ischemia of the right lower limb secondary to peripheral arterial disease      Comorbidities   Benign hypertensive heart disease with systolic congestive heart failure, NYHA class 2     History of vitamin B12 deficiency    DDD (degenerative disc disease), lumbar    Erectile dysfunction    Spinal stenosis of lumbar region with radiculopathy    Hereditary peripheral neuropathy    Aortoiliac occlusive disease     Atherosclerosis of native artery of both lower extremities with intermittent claudication     Peripheral artery disease     Coronary artery disease involving native coronary artery of native heart    Chronic atrial fibrillation     History of transient alteration of awareness, resolved    Acute blood loss as cause of postoperative anemia    Anticoagulated on Coumadin    Ischemic cardiomyopathy    Chronic systolic heart failure     Carotid artery disease     Dyslipidemia    History of hyperuricemia    Euthyroid sick syndrome    Aftercare following surgery of the circulatory system    History of cardioversion    Chronic ulcers of right foot    Vitamin D deficiency       Plan:     1. Justification for continued stay: Good progression towards established rehabilitation goals. 2. Medical Issues being followed closely:    [x]  Fall and safety precautions     [x]  Wound Care     [x]  Bowel and Bladder Function     [x]  Fluid Electrolyte and Nutrition Balance     [x]  Pain Control      3. Issues that 24 hour rehabilitation nursing is following:    [x]  Fall and safety precautions     [x]  Wound Care     [x]  Bowel and Bladder Function     [x]  Fluid Electrolyte and Nutrition Balance     [x]  Pain Control      [x]  Assistance with and education on in-room safety with transfers to and from the bed, wheelchair, toilet and shower. 4. Acute rehabilitation plan of care:    [x]  Continue current care and rehab. [x]  Physical Therapy           [x]  Occupational Therapy           []  Speech Therapy     []  Hold Rehab until further notice     5. Medications:    [x]  MAR Reviewed     [x]  Continue Present Medications     6. DVT Prophylaxis:      []  Lovenox     []  Unfractionated Heparin     [x]  Coumadin     []  NOAC     [x]  VASQUEZ Stockings     [x]  Sequential Compression Device     []  None     7.  Orders:   > S/P Right axillary to bifemoral artery bypass using an 8 mm Propaten graft from the right axilla to the right common femoral artery with a jump femoral-femoral bypass with another 8 mm Propaten external ring bypass; Right common femoral artery, and profunda femoris artery and superficial femoral artery endarterectomy causing an extensive surgery on the right side (4/4/2017 - Dr. Jennifer Kent); S/P Cutdown of femoral-femoral bypass, more on the left groin side; Right lower extremity angiography with first-order catheterization (4/7/2017 - Dr. Jennifer Kent); S/P Right femoral to above-knee popliteal bypass with an 8 mm PTFE graft (4/10/2017 - Dr. Abhijeet Balderrama Davidson);  History of critical ischemia of the right lower limb secondary to peripheral arterial disease   > Continue:    > Aspirin 81 mg PO once daily with breakfast    > Atorvastatin 40 mg PO q HS    > Clopidogrel 75 mg PO once daily with dinner      > Acute Postoperative Blood Loss Anemia   > Hgb/Hct (3/15/2017, prior to admission to [facility]) = 14.3/40.8   > Hgb/Hct (4/4/2017, post-op) = 9.8/27.1    > Hgb/Hct (4/19/2017) = 8.2/23.7   > Hgb/Hct (4/23/2017, on admission) = 8.0/23.3   > Anemia work-up showed serum iron 21, TIBC 196, iron % saturation 11, ferritin 779, reticulocyte count 3.7   > Patient was started on FeSO4 and Ascorbic acid   > Hgb/Hct (4/24/2017) = 8.0/23.8   > Epoetin constance 10,000 units SC x 1 dose today     > Continue:    > FeSO4 325 mg PO once daily with breakfast     > Ascorbic Acid 250 mg PO once daily with breakfast (to enhance the absorption of the FeSO4)       > Benign hypertensive heart disease with chronic systolic heart failure   > Continue:    > Diltiazem  mg PO once daily (6AM)    > Metoprolol tartrate 25 mg PO q 12 hr (9AM, 9PM)    > Losartan 25 mg PO once daily (12PM)      > Chronic atrial fibrillation, S/P Synchronized external cardioversion (4/18/2017 - Dr. Yasmine Montejo), anticoagulated on Coumadin   > Continue:    > Amiodarone 400 mg PO BID to complete 14 days (until 4/21/2017), then decrease to 200 mg PO BID afterwards    > Diltiazem  mg PO once daily (6AM)    > Metoprolol tartrate 25 mg PO q 12 hr (9AM, 9PM)   > Target INR = 2.5 to 3.5   > Patient received Coumadin 3 mg PO last night   > INR 2.1   > Coumadin 3 mg PO tonight      > Chronic ulcer of right foot   > Heel suspension boot on both feet when supine in bed   > Wound care recommendations as per Podiatry: Betadine dressings, non compressive, with 4x4s, conform once daily   > Patient was started on Ascorbic acid and Zinc sulfate   > Continue:    > Ascorbic acid 250 mg PO once daily    > Zinc sulfate 220 mg PO once daily      > Sick euthyroid syndrome   > TSH (4/6/2017) = 4.77   > TSH (4/22/2017) = 5.08   > Free T4 (4/22/2017) = 1.2    > On 4/22/2017, patient was started on oral Levothyroxine 100 mcg PO once daily x 2 doses   > Continue Levothyroxine 50 mcg PO once daily x 2 doses, then decrease to 25 mcg PO once daily x 2 doses then discontinue      > History of hyperuricemia   > Uric acid (7/6/2016) = 9.8   > Uric acid (4/21/2017) = 2.9      > Vitamin D Deficiency   > Vitamin D 25-Hydroxy (4/22/2017) = 12.0   > Patient was given Cholecalciferol 50,000 units PO x 1 dose    > On 4/23/2017, patient was started on Cholecalciferol 5,000 units PO once daily   > Continue Cholecalciferol 5,000 units PO once daily      > Analgesia   > Continue:    > Acetaminophen 650 mg PO q 4 hr PRN for pain level less than 5/10    > Duloxetine 60 mg PO once daily    > Gabapentin 100 mg PO BID    > Percocet 10/325 1 tab PO q 4 hr PRN for pain level greater than 4/10       8. Patient's progress in rehabilitation and medical issues discussed with the patient. All questions answered to the best of my ability. Care plan discussed with patient and nurse.       SignedJanny Hagen MD    April 24, 2017

## 2017-04-25 LAB
INR PPP: 2.3 (ref 0.8–1.2)
PROTHROMBIN TIME: 24.3 SEC (ref 11.5–15.2)

## 2017-04-25 PROCEDURE — 85610 PROTHROMBIN TIME: CPT | Performed by: INTERNAL MEDICINE

## 2017-04-25 PROCEDURE — 97150 GROUP THERAPEUTIC PROCEDURES: CPT

## 2017-04-25 PROCEDURE — 97110 THERAPEUTIC EXERCISES: CPT

## 2017-04-25 PROCEDURE — 97535 SELF CARE MNGMENT TRAINING: CPT

## 2017-04-25 PROCEDURE — 74011250637 HC RX REV CODE- 250/637: Performed by: INTERNAL MEDICINE

## 2017-04-25 PROCEDURE — 65310000000 HC RM PRIVATE REHAB

## 2017-04-25 PROCEDURE — 97116 GAIT TRAINING THERAPY: CPT

## 2017-04-25 PROCEDURE — 36415 COLL VENOUS BLD VENIPUNCTURE: CPT | Performed by: INTERNAL MEDICINE

## 2017-04-25 RX ORDER — METOPROLOL TARTRATE 25 MG/1
12.5 TABLET, FILM COATED ORAL EVERY 12 HOURS
Status: DISCONTINUED | OUTPATIENT
Start: 2017-04-25 | End: 2017-04-29 | Stop reason: HOSPADM

## 2017-04-25 RX ORDER — DILTIAZEM HYDROCHLORIDE 30 MG/1
30 TABLET, FILM COATED ORAL
Status: DISCONTINUED | OUTPATIENT
Start: 2017-04-25 | End: 2017-04-29 | Stop reason: HOSPADM

## 2017-04-25 RX ORDER — LOSARTAN POTASSIUM 25 MG/1
25 TABLET ORAL DAILY
Status: DISCONTINUED | OUTPATIENT
Start: 2017-04-26 | End: 2017-04-29 | Stop reason: HOSPADM

## 2017-04-25 RX ORDER — WARFARIN 3 MG/1
3 TABLET ORAL ONCE
Status: COMPLETED | OUTPATIENT
Start: 2017-04-25 | End: 2017-04-25

## 2017-04-25 RX ADMIN — DULOXETINE HYDROCHLORIDE 60 MG: 60 CAPSULE, DELAYED RELEASE ORAL at 09:03

## 2017-04-25 RX ADMIN — ZINC SULFATE 220 MG (50 MG) CAPSULE 220 MG: CAPSULE at 09:04

## 2017-04-25 RX ADMIN — ASPIRIN 81 MG CHEWABLE TABLET 81 MG: 81 TABLET CHEWABLE at 09:03

## 2017-04-25 RX ADMIN — AMIODARONE HYDROCHLORIDE 400 MG: 200 TABLET ORAL at 09:03

## 2017-04-25 RX ADMIN — LACTOBACILLUS TAB 2 TABLET: TAB at 17:36

## 2017-04-25 RX ADMIN — GABAPENTIN 100 MG: 100 CAPSULE ORAL at 17:36

## 2017-04-25 RX ADMIN — WARFARIN SODIUM 3 MG: 3 TABLET ORAL at 17:36

## 2017-04-25 RX ADMIN — OXYCODONE HYDROCHLORIDE AND ACETAMINOPHEN 1 TABLET: 10; 325 TABLET ORAL at 05:47

## 2017-04-25 RX ADMIN — OXYCODONE HYDROCHLORIDE AND ACETAMINOPHEN 1 TABLET: 10; 325 TABLET ORAL at 15:07

## 2017-04-25 RX ADMIN — DOCUSATE SODIUM 100 MG: 100 CAPSULE, LIQUID FILLED ORAL at 17:37

## 2017-04-25 RX ADMIN — Medication 5000 UNITS: at 09:04

## 2017-04-25 RX ADMIN — OXYCODONE HYDROCHLORIDE AND ACETAMINOPHEN 1 TABLET: 10; 325 TABLET ORAL at 01:15

## 2017-04-25 RX ADMIN — AMIODARONE HYDROCHLORIDE 400 MG: 200 TABLET ORAL at 17:36

## 2017-04-25 RX ADMIN — LACTOBACILLUS TAB 2 TABLET: TAB at 09:04

## 2017-04-25 RX ADMIN — FERROUS SULFATE TAB 325 MG (65 MG ELEMENTAL FE) 325 MG: 325 (65 FE) TAB at 09:03

## 2017-04-25 RX ADMIN — DILTIAZEM HYDROCHLORIDE 30 MG: 30 TABLET, FILM COATED ORAL at 21:03

## 2017-04-25 RX ADMIN — CLOPIDOGREL BISULFATE 75 MG: 75 TABLET, FILM COATED ORAL at 17:36

## 2017-04-25 RX ADMIN — ATORVASTATIN CALCIUM 40 MG: 40 TABLET, FILM COATED ORAL at 20:43

## 2017-04-25 RX ADMIN — OXYCODONE HYDROCHLORIDE AND ACETAMINOPHEN 1 TABLET: 10; 325 TABLET ORAL at 18:48

## 2017-04-25 RX ADMIN — LEVOTHYROXINE SODIUM 50 MCG: 25 TABLET ORAL at 12:05

## 2017-04-25 RX ADMIN — OXYCODONE HYDROCHLORIDE AND ACETAMINOPHEN 1 TABLET: 10; 325 TABLET ORAL at 10:58

## 2017-04-25 RX ADMIN — DILTIAZEM HYDROCHLORIDE 30 MG: 30 TABLET, FILM COATED ORAL at 17:37

## 2017-04-25 RX ADMIN — Medication 250 MG: at 09:04

## 2017-04-25 RX ADMIN — DOCUSATE SODIUM 100 MG: 100 CAPSULE, LIQUID FILLED ORAL at 09:04

## 2017-04-25 RX ADMIN — GABAPENTIN 100 MG: 100 CAPSULE ORAL at 09:04

## 2017-04-25 NOTE — PROGRESS NOTES
SHIFT CHANGE NOTE FOR [facility]    Bedside and Verbal shift change report given to  Erika Multani LPN   oncoming nurse) by Ilana Palmer RN   (offgoing nurse). Report included the following information SBAR, Kardex, MAR and Recent Results.     Situation:   Code Status: Full Code   Reason for Admission: [address] Day: 4   Problem List:   Hospital Problems  Date Reviewed: 4/24/2017          Codes Class Noted POA    Vitamin D deficiency (Chronic) ICD-10-CM: E55.9  ICD-9-CM: 268.9  4/22/2017 Yes    Overview Signed 4/22/2017 12:56 PM by Jame Burkitt, MD     Vitamin D 25-Hydroxy (4/22/2017) = 12.0             Anticoagulated on Coumadin (Chronic) ICD-10-CM: Z51.81, Z79.01  ICD-9-CM: V58.83, V58.61  Unknown Yes        Hyperuricemia (Chronic) ICD-10-CM: E79.0  ICD-9-CM: 790.6  Unknown Yes        Aftercare following surgery of the circulatory system ICD-10-CM: T46.627  ICD-9-CM: V58.73  4/21/2017 Yes    Overview Signed 4/21/2017  2:32 PM by Jame Burkitt, MD     S/P Right axillary to bifemoral artery bypass using an 8 mm Propaten graft from the right axilla to the right common femoral artery with a jump femoral-femoral bypass with another 8 mm Propaten external ring bypass; Right common femoral artery, and profunda femoris artery and superficial femoral artery endarterectomy causing an extensive surgery on the right side (4/4/2017 - Dr. Atul Kirkland)    S/P Cutdown of femoral-femoral bypass, more on the left groin side; Right lower extremity angiography with first-order catheterization (4/7/2017 - Dr. Atul Kirkland)    S/P Right femoral to above-knee popliteal bypass with an 8 mm PTFE graft (4/10/2017 - Dr. Swapna Velasquez)             * (Principal)Status post femoral-popliteal bypass surgery ICD-10-CM: Z95.828  ICD-9-CM: V45.89  4/21/2017 Yes    Overview Signed 4/21/2017  2:33 PM by Jame Burkitt, MD     S/P Right axillary to bifemoral artery bypass using an 8 mm Propaten graft from the right axilla to the right common femoral artery with a jump femoral-femoral bypass with another 8 mm Propaten external ring bypass; Right common femoral artery, and profunda femoris artery and superficial femoral artery endarterectomy causing an extensive surgery on the right side (4/4/2017 - Dr. Tres Jang)    S/P Cutdown of femoral-femoral bypass, more on the left groin side; Right lower extremity angiography with first-order catheterization (4/7/2017 - Dr. Tres Jang)    S/P Right femoral to above-knee popliteal bypass with an 8 mm PTFE graft (4/10/2017 - Dr. Gabi Clements)             History of cardioversion ICD-10-CM: Z98.890  ICD-9-CM: V15.1  4/18/2017 Yes    Overview Signed 4/21/2017  2:34 PM by Pearlean Felty, MD     S/P Synchronized external cardioversion (4/18/2017 - Dr. Lawanda Peterson)             Ch[facility] ([facility] (Chronic) ICD-10-CM: A48.7  ICD-9-CM: 427.31  Unknown Yes        Critical ischemia of lower extremity ICD-10-CM: I99.8  ICD-9-CM: 459.9  4/10/2017 No    Overview Signed 4/21/2017  4:41 PM by Pearlean Felty, MD     Right lower limb             Euthyroid sick syndrome ICD-10-CM: E07.81  ICD-9-CM: 790.94  4/6/2017 Yes        Acute blood loss as cause of postoperative anemia ICD-10-CM: D62  ICD-9-CM: 285.1  4/4/2017 Yes        Impaired mobility and ADLs ICD-10-CM: Z74.09  ICD-9-CM: 799.89  4/4/2017 Yes        Chronic ulcer of right foot (HCC) (Chronic) ICD-10-CM: L97.519  ICD-9-CM: 707.15  4/4/2017 Yes        Aortoiliac occlusive disease ([facility] (Chronic) ICD-10-CM: S76.86  ICD-9-CM: 444.09  1/25/2017 Yes        Atherosclerosis of native artery of both lower extremities with intermittent claudication (HCC) (Chronic) ICD-10-CM: C64.435  ICD-9-CM: 440.21  1/25/2017 Yes        Peripheral artery disease (HCC) (Chronic) ICD-10-CM: I73.9  ICD-9-CM: 443.9  1/25/2017 Yes        Benign hypertensive heart disease with systolic congestive heart failure, NYHA class 2 (HCC) (Chronic) ICD-10-CM: I11.0, I50.20  ICD-9-CM: 402.11, 428.20, 428.0  1/26/2015 Yes              Background:   Past Medical History:   Past Medical History:   Diagnosis Date    Acute blood loss as cause of postoperative anemia 4/4/2017    Anticoagulated on Coumadin     Aortoiliac occlusive disease ([facility] 1/25/2017    Atherosclerosis of native artery of both lower extremities with intermittent claudication ([facility] 1/25/2017    Benign hypertensive heart disease with systolic congestive heart failure, NYHA class 2 ([facility] 1/26/2015    Carotid artery disease (HCC)     Chronic atrial fibrillation (HCC)     Chronic systolic heart failure (HCC)     Chronic ulcer of right foot ([facility] 4/4/2017    Coronary artery disease involving native coronary artery of native heart 3/15/2017    Successful stenting of Cx (Xience LESLEY) and RCA (Xience LESLEY) to 0% by Dr. Nirali Eckert on 3/15/17.     DDD (degenerative disc disease), lumbar 1/26/2015    Dyslipidemia     Erectile dysfunction 7/5/2016    Euthyroid sick syndrome 4/6/2017    Hereditary peripheral neuropathy 11/15/2016    History of cardioversion 4/18/2017    S/P Synchronized external cardioversion (4/18/2017 - Dr. Александр Moreno)    Hyperuricemia     Ischemic cardiomyopathy     Peripheral artery disease ([facility] 1/25/2017    Spinal stenosis of lumbar region with radiculopathy 5/4/2015    Dr. Rose Lancaster Vitamin D deficiency 4/22/2017    Vitamin D 25-Hydroxy (4/22/2017) = 12.0      Patient taking anticoagulants YES   Patient has a defibrillator: no     Assessment:   Changes in Assessment throughout shift: NONE                     Last Vitals:     Vitals:    04/24/17 0734 04/24/17 1234 04/24/17 1616 04/24/17 2029   BP: 125/66 125/67 109/57 97/59   Pulse: 61 68 72 69   Resp: 20 18 18   Temp: 97.6 °F (36.4 °C)  98.1 °F (36.7 °C) 97.2 °F (36.2 °C)   SpO2: 98%  100% 100%        PAIN    Pain Assessment    Pain Intensity 1: 0 (04/25/17 0400) Pain Intensity 1: 2 (12/29/14 1105)    Pain Location 1: Foot Pain Location 1: Abdomen    Pain Intervention(s) 1: Medication (see MAR) Pain Intervention(s) 1: Medication (see MAR)  Patient Stated Pain Goal: 0 Patient Stated Pain Goal: 0  o Intervention effective: YES   o Other actions taken for pain: MEDICATION     Skin Assessment  Skin color Skin Color: Appropriate for ethnicity  Condition/Temperature Skin Condition/Temp: Dry, Warm  Integrity Skin Integrity: Incision (comment)  Turgor Turgor: Non-tenting  Weekly Pressure Ulcer Documentation Weekly Pressure Ulcer Documentation: No Pressure Ulcer Noted-Pressure Ulcer Prevention Initiated  Wound Prevention & Protection Methods  Orientation of wound Orientation of Wound Prevention: Posterior  Location of Prevention Location of Wound Prevention: Buttocks, Sacrum/Coccyx  Dressing Present Dressing Present : Yes  Dressing Status Dressing Status: Intact  Wound Offloading Wound Offloading (Prevention Methods): Bed, pressure redistribution/air     INTAKE/OUPUT    Date 04/24/17 0700 - 04/25/17 0659 04/25/17 0700 - 04/26/17 0659   Shift 1223-5968 9594-0498 24 Hour Total 7865-2743 9668-0464 24 Hour Total   I  N  T  A  K  E   P. O. 720  720         P. O. 720  720       Shift Total 720  720      O  U  T  P  U  T   Urine 300 1800 2100         Urine Voided 300 1800 2100         Urine Occurrence(s) 7 x 4 x 11 x       Stool            Stool Occurrence(s) 5 x 0 x 5 x       Shift Total 300 1800 2100       -1800 -1380      Weight (kg)             Recommendations:  1. Patient needs and requests: TOILETING    2. Diet: CARDIAC REG    3. Pending tests/procedures: LABS    4. Functional Level/Equipment: wheelchair    5. Estimated Discharge Date: TBD Posted on Whiteboard in Patients Room: no     HEALS Safety Check    A safety check occurred in the patient's room between off going nurse and oncoming nurse listed above.     The safety check included the below items  Area Items   H  High Alert Medications - Verify all high alert medication drips (heparin, PCA, etc.)   E  Equipment - Suction is set up for ALL patients (with cailin)  - Red plugs utilized for all equipment (IV pumps, etc.)  - WOWs wiped down at end of shift.  - Room stocked with oxygen, suction, and other unit-specific supplies   A  Alarms - Bed alarm is set for fall risk patients  - Ensure chair alarm is in place and activated if patient is up in a chair   L  Lines - Check IV for any infiltration  - Jiang bag is empty if patient has a Jiang   - Tubing and IV bags are labeled   S  Safety   - Room is clean, patient is clean, and equipment is clean. - Hallways are clear from equipment besides carts. - Fall bracelet on for fall risk patients  - Ensure room is clear and free of clutter  - Suction is set up for ALL patients (with cailin)  - Hallways are clear from equipment besides carts.    - Isolation precautions followed, supplies available outside room, sign posted

## 2017-04-25 NOTE — PROGRESS NOTES
SHIFT CHANGE NOTE FOR [facility]    Bedside and Verbal shift change report given to  [facility] RN oncoming nurse) by Lisa Nicholson LPN   (offgoing nurse). Report included the following information SBAR, Kardex, MAR and Recent Results.     Situation:   Code Status: Full Code   Reason for Admission: [address] Day: 4   Problem List:   Hospital Problems  Date Reviewed: 4/25/2017          Codes Class Noted POA    Vitamin D deficiency (Chronic) ICD-10-CM: E55.9  ICD-9-CM: 268.9  4/22/2017 Yes    Overview Signed 4/22/2017 12:56 PM by Suraj Garcia MD     Vitamin D 25-Hydroxy (4/22/2017) = 12.0             Anticoagulated on Coumadin (Chronic) ICD-10-CM: Z51.81, Z79.01  ICD-9-CM: V58.83, V58.61  Unknown Yes        Hyperuricemia (Chronic) ICD-10-CM: E79.0  ICD-9-CM: 790.6  Unknown Yes        Aftercare following surgery of the circulatory system ICD-10-CM: V11.067  ICD-9-CM: V58.73  4/21/2017 Yes    Overview Signed 4/21/2017  2:32 PM by Suraj Garcia MD     S/P Right axillary to bifemoral artery bypass using an 8 mm Propaten graft from the right axilla to the right common femoral artery with a jump femoral-femoral bypass with another 8 mm Propaten external ring bypass; Right common femoral artery, and profunda femoris artery and superficial femoral artery endarterectomy causing an extensive surgery on the right side (4/4/2017 - Dr. Marcia Portillo)    S/P Cutdown of femoral-femoral bypass, more on the left groin side; Right lower extremity angiography with first-order catheterization (4/7/2017 - Dr. Marcia Portillo)    S/P Right femoral to above-knee popliteal bypass with an 8 mm PTFE graft (4/10/2017 - Dr. Ny Chau)             * (Principal)Status post femoral-popliteal bypass surgery ICD-10-CM: Z95.828  ICD-9-CM: V45.89  4/21/2017 Yes    Overview Signed 4/21/2017  2:33 PM by Suraj Garcia MD     S/P Right axillary to bifemoral artery bypass using an 8 mm Propaten graft from the right axilla to the right common femoral artery with a jump femoral-femoral bypass with another 8 mm Propaten external ring bypass; Right common femoral artery, and profunda femoris artery and superficial femoral artery endarterectomy causing an extensive surgery on the right side (4/4/2017 - Dr. Jono Ruiz)    S/P Cutdown of femoral-femoral bypass, more on the left groin side; Right lower extremity angiography with first-order catheterization (4/7/2017 - Dr. Jono Ruiz)    S/P Right femoral to above-knee popliteal bypass with an 8 mm PTFE graft (4/10/2017 - Dr. Vamshi Aly)             History of cardioversion ICD-10-CM: Z98.890  ICD-9-CM: V15.1  4/18/2017 Yes    Overview Signed 4/21/2017  2:34 PM by Hayden Carter MD     S/P Synchronized external cardioversion (4/18/2017 - Dr. Lobo Murrieta)             Chronic [facility] ([facility] (Chronic) ICD-10-CM: I63.1  ICD-9-CM: 427.31  Unknown Yes        Critical ischemia of lower extremity ICD-10-CM: I99.8  ICD-9-CM: 459.9  4/10/2017 No    Overview Signed 4/21/2017  4:41 PM by Hayden Carter MD     Right lower limb             Euthyroid sick syndrome ICD-10-CM: E07.81  ICD-9-CM: 790.94  4/6/2017 Yes        Acute blood loss as cause of postoperative anemia ICD-10-CM: D62  ICD-9-CM: 285.1  4/4/2017 Yes        Impaired mobility and ADLs ICD-10-CM: Z74.09  ICD-9-CM: 799.89  4/4/2017 Yes        Chronic ulcer of right foot (HCC) (Chronic) ICD-10-CM: L97.519  ICD-9-CM: 707.15  4/4/2017 Yes        Aortoiliac occlusive disease ([facility] (Chronic) ICD-10-CM: F79.18  ICD-9-CM: 444.09  1/25/2017 Yes        Atherosclerosis of native artery of both lower extremities with intermittent claudication (HCC) (Chronic) ICD-10-CM: K72.751  ICD-9-CM: 440.21  1/25/2017 Yes        Peripheral artery disease (HCC) (Chronic) ICD-10-CM: I73.9  ICD-9-CM: 443.9  1/25/2017 Yes        Benign hypertensive heart disease with systolic congestive heart failure, NYHA class 2 (HCC) (Chronic) ICD-10-CM: I11.0, I50.20  ICD-9-CM: 402.11, 428.20, 428.0  1/26/2015 Yes              Background:   Past Medical History:   Past Medical History:   Diagnosis Date    Acute blood loss as cause of postoperative anemia 4/4/2017    Anticoagulated on Coumadin     [facility] occlusive disease ([facility] 1/25/2017    Atherosclerosis of native artery of both lower extremities with intermittent claudication ([facility] 1/25/2017    Benign hypertensive heart disease with systolic congestive heart failure, NYHA class 2 ([facility] 1/26/2015    Carotid artery disease (HCC)     Chronic atrial fibrillation (HCC)     Chronic systolic heart failure (HCC)     Chronic ulcer of right foot ([facility] 4/4/2017    Coronary artery disease involving native coronary artery of native heart 3/15/2017    Successful stenting of Cx (Xience LESLEY) and RCA (Xience LESLEY) to 0% by Dr. Mihir Mccall on 3/15/17.     DDD (degenerative disc disease), lumbar 1/26/2015    Dyslipidemia     Erectile dysfunction 7/5/2016    Euthyroid sick syndrome 4/6/2017    Hereditary peripheral neuropathy 11/15/2016    History of cardioversion 4/18/2017    S/P Synchronized external cardioversion (4/18/2017 - Dr. Gelacio Silva)    Hyperuricemia     Ischemic cardiomyopathy     Peripheral artery disease ([facility] 1/25/2017    Spinal stenosis of lumbar region with radiculopathy 5/4/2015    Dr. Lisette Modi Vitamin D deficiency 4/22/2017    Vitamin D 25-Hydroxy (4/22/2017) = 12.0      Patient taking anticoagulants YES   Patient has a defibrillator: no     Assessment:   Changes in Assessment throughout shift: NONE                     Last Vitals:     Vitals:    04/24/17 1616 04/24/17 2029 04/25/17 0549 04/25/17 0744   BP: 109/57 97/59 100/55 97/59   Pulse: 72 69 73 68   Resp: 18 18  20   Temp: 98.1 °F (36.7 °C) 97.2 °F (36.2 °C)  97.1 °F (36.2 °C)   SpO2: 100% 100%  99%        PAIN    Pain Assessment    Pain Intensity 1: 7 (04/25/17 1507) Pain Intensity 1: 2 (12/29/14 1105)    Pain Location 1: Foot Pain Location 1: Abdomen    Pain Intervention(s) 1: Medication (see MAR) Pain Intervention(s) 1: Medication (see MAR)  Patient Stated Pain Goal: 0 Patient Stated Pain Goal: 0  o Intervention effective: YES   o Other actions taken for pain: MEDICATION     Skin Assessment  Skin color Skin Color: Appropriate for ethnicity  Condition/Temperature Skin Condition/Temp: Dry, Warm  Integrity Skin Integrity: Incision (comment)  Turgor Turgor: Non-tenting  Weekly Pressure Ulcer Documentation  Pressure  Injury Documentation: No Pressure Ulcer Noted-Pressure Ulcer Prevention Initiated  Wound Prevention & Protection Methods  Orientation of wound Orientation of Wound Prevention: Posterior  Location of Prevention Location of Wound Prevention: Buttocks, Sacrum/Coccyx, Heel  Dressing Present Dressing Present : Yes  Dressing Status Dressing Status: Intact  Wound Offloading Wound Offloading (Prevention Methods): Bed, pressure redistribution/air     INTAKE/OUPUT    Date 04/24/17 0700 - 04/25/17 0659 04/25/17 0700 - 04/26/17 0659   Shift 7456-6919 5648-5401 24 Hour Total 0569-3153 6837-5752 24 Hour Total   I  N  T  A  K  E   P. O. 720  720 480  480      P. O. 720  720 480  480    Shift Total 720  720 480  480   O  U  T  P  U  T   Urine 300 2400 2700 200  200      Urine Voided 300 2400 2700 200  200      Urine Occurrence(s) 7 x 6 x 13 x 3 x  3 x    Stool            Stool Occurrence(s) 5 x 0 x 5 x 1 x  1 x    Shift Total 300 2400 2700 200  200    -2400 -1980 280  280   Weight (kg)             Recommendations:  1. Patient needs and requests: TOILETING    2. Diet: CARDIAC REG    3. Pending tests/procedures: LABS    4. Functional Level/Equipment: wheelchair    5. Estimated Discharge Date: TBD Posted on Whiteboard in Patients Room: no     HEALS Safety Check    A safety check occurred in the patient's room between off going nurse and oncoming nurse listed above.     The safety check included the below items  Area Items   H  High Alert Medications - Verify all high alert medication drips (heparin, PCA, etc.)   E  Equipment - Suction is set up for ALL patients (with cailin)  - Red plugs utilized for all equipment (IV pumps, etc.)  - WOWs wiped down at end of shift.  - Room stocked with oxygen, suction, and other unit-specific supplies   A  Alarms - Bed alarm is set for fall risk patients  - Ensure chair alarm is in place and activated if patient is up in a chair   L  Lines - Check IV for any infiltration  - Jiang bag is empty if patient has a Jiang   - Tubing and IV bags are labeled   S  Safety   - Room is clean, patient is clean, and equipment is clean. - Hallways are clear from equipment besides carts. - Fall bracelet on for fall risk patients  - Ensure room is clear and free of clutter  - Suction is set up for ALL patients (with cailin)  - Hallways are clear from equipment besides carts.    - Isolation precautions followed, supplies available outside room, sign posted

## 2017-04-25 NOTE — PROGRESS NOTES
Problem: Self Care Deficits Care Plan (Adult)  Goal: *Acute Goals and Plan of Care (Insert Text)  Long Term Goals (to be met upon discharge date) in order to increase pts functional independence and safety, and decrease burden of care:  1. Pt will perform grooming with independence while standing. 2. Pt will perform UB bathing with modified independence. 3. Pt will perform LB bathing with modified independence. 4. Pt will perform tub/shower transfer with modified independence. 5. Pt will perform UB dressing with independence. 6. Pt will perform LB dressing with modified independence. 7. Pt will perform toileting task with modified independence. 8. Pt will perform toilet transfer with modified independence. Short Term Weekly Goals for (4/22/2017 to 4/29/2017) in order to increase pts functional independence and safety, and decrease burden of care:  1. Pt will perform grooming with independence while standing. 2. Pt will perform UB bathing with modified independence. 3. Pt will perform LB bathing with modified independence. 4. Pt will perform tub/shower transfer with supervision. 5. Pt will perform UB dressing with independence. 6. Pt will perform LB dressing with supervision. 7. Pt will perform toileting task with supervision. 8. Pt will perform toilet transfer with supervision. OCCUPATIONAL THERAPY DAILY NOTE  Patient Name:Mj Moya        Medical Diagnosis:  Debility   Status post femoral-popliteal bypass surgery Status post femoral-popliteal bypass surgery      Pain at start of tx:6  Pain at stop of tx:8    Subjective: \" I'm trying to do just like ya'll say. \"     Objective:        IADL Daily Assessment     Pt performed kitchen activity with RW transporting plate with walker basket from microwave to high low table with S.  Pt performed laundry task with S using RW and RW basket transporting items from washer to table to fold standing x approx 6 minutes.    Pt transferred to various surfaces including recliner, sofa, dining chair with S and cues for positioning. MOBILITY/TRANSFERS Daily Assessment      Various transfers w/c to chair with S with RW. Assessment: Pt progressing with IADLs now S with RW.       Plan of Care: Continue with [address] [place], [place] [id number]  4/25/2017

## 2017-04-25 NOTE — PROGRESS NOTES
Pt is a 59year old male admitted to ARU for s/p femoral-popliteal bypass. Pt is alert and oriented with family in the room. Pt consents to conversation with family present. Pt states that he lives, alone in a trailer with 3 steps to enter and a right handrail. Pt states that he has a tub shower. Pt states that prior to admission he was able to self care with no history with DME, home health, outpatient therapy or SNF. Pt states that he was working full time at [employer] and hopes to one day return. Pt states that he is  from his wife but remains legally . Pt denies having a POA and notes that he wants his daughter, Gemma Brower ([phone]) to be his NOK contact. Pt verifies his insurance as [employer]. Dary explained dc planning, team conference and insurance updates. Dary provided contact information for self and fax number for the unit so that he can call his work to determine if he qualifies for STD or FMLA. Dary will follow.

## 2017-04-25 NOTE — PROGRESS NOTES
[facility] PHYSICAL REHABILITATION  [address] [place] [address], [address]     INPATIENT REHABILITATION  DAILY PROGRESS NOTE     Date: 4/25/2017    Name: Pamela Castillo Age / Sex: 59 y.o. / male   CSN: [id number] MRN: [id number]   [facility] Date: 4/21/2017 Length of Stay: 4 days     Primary Rehab Diagnosis: Impaired Mobility and ADLs secondary to:  1. S/P Right axillary to bifemoral artery bypass using an 8 mm Propaten graft from the right axilla to the right common femoral artery with a jump femoral-femoral bypass with another 8 mm Propaten external ring bypass; Right common femoral artery, and profunda femoris artery and superficial femoral artery endarterectomy causing an extensive surgery on the right side (4/4/2017 - Dr. Audrey Zaidi); S/P Cutdown of femoral-femoral bypass, more on the left groin side; Right lower extremity angiography with first-order catheterization (4/7/2017 - Dr. Audrey Zaidi); S/P Right femoral to above-knee popliteal bypass with an 8 mm PTFE graft (4/10/2017 - Dr. Juan Romero)  2. History of critical ischemia of the right lower limb secondary to peripheral arterial disease      Subjective:     Patient seen and examined. Blood pressure controlled. Diltiazem CD and Metoprolol tartrate were not given this AM due to BP parameters.  Team conference was held at bedside this AM.     Patient's Complaint:   No significant medical complaints    Pain Control: stable, mild-to-moderate joint symptoms intermittently, reasonably well controlled by current meds      Objective:     Vital Signs:  Patient Vitals for the past 24 hrs:   BP Temp Pulse Resp SpO2   04/25/17 0744 97/59 97.1 °F (36.2 °C) 68 20 99 %   04/25/17 0549 100/55 - 73 - -   04/24/17 2029 97/59 97.2 °F (36.2 °C) 69 18 100 %   04/24/17 1616 109/57 98.1 °F (36.7 °C) 72 18 100 %   04/24/17 1234 125/67 - 68 - -        Physical Examination:  GENERAL SURVEY: Patient is awake, alert, oriented x 3, sitting comfortably on the chair, not in acute respiratory distress. HEENT: pale palpebral conjunctivae, anicteric sclerae, no nasoaural discharge, moist oral mucosa  NECK: supple, no jugular venous distention, no palpable lymph nodes  CHEST/LUNGS: symmetrical chest expansion, good air entry, clear breath sounds  HEART: adynamic precordium, good S1 S2, no S3, regular rhythm, no murmurs  ABDOMEN: flat, bowel sounds appreciated, soft, non-tender  EXTREMITIES: (+) necrotic areas noted on plantar surface of right heel, lateral surface of right calf area and nailbase of right great toe, pale nailbeds, (+) Grade 1-2 edema on right leg, full and equal pulses, no calf tenderness   NEUROLOGICAL EXAM: The patient is awake, alert and oriented x3, able to answer questions fairly appropriately, able to follow 1 and 2 step commands. Able to tell time from the wall clock. Cranial nerves II-XII are grossly intact. No gross sensory deficit.  Motor strength is 4+/5 on BUE and the LLE, 4- to 4/5 on the RLE (except for 2-/5 on the right ankle and 1/5 on toes of right foot).     Post-op Incision(s): healing well, clean, dry, and intact      Current Medications:  Current Facility-Administered Medications   Medication Dose Route Frequency    pneumococcal 23-valent (PNEUMOVAX 23) injection 0.5 mL  0.5 mL IntraMUSCular PRIOR TO DISCHARGE    levothyroxine (SYNTHROID) tablet 50 mcg  50 mcg Oral ACB    [START ON 4/26/2017] levothyroxine (SYNTHROID) tablet 25 mcg  25 mcg Oral ACB    cholecalciferol (VITAMIN D3) capsule 5,000 Units  5,000 Units Oral DAILY    ferrous sulfate tablet 325 mg  1 Tab Oral DAILY WITH BREAKFAST    ascorbic acid (vitamin C) (VITAMIN C) tablet 250 mg  250 mg Oral DAILY WITH BREAKFAST    acetaminophen (TYLENOL) tablet 650 mg  650 mg Oral Q4H PRN    docusate sodium (COLACE) capsule 100 mg  100 mg Oral BID    bisacodyl (DULCOLAX) tablet 10 mg  10 mg Oral Q48H PRN    amiodarone (CORDARONE) tablet 400 mg  400 mg Oral BID    dilTIAZem CD (CARDIZEM CD) capsule 120 mg  120 mg Oral DAILY    oxyCODONE-acetaminophen (PERCOCET 10)  mg per tablet 1 Tab  1 Tab Oral Q4H PRN    metoprolol tartrate (LOPRESSOR) tablet 25 mg  25 mg Oral Q12H    clopidogrel (PLAVIX) tablet 75 mg  75 mg Oral DAILY WITH DINNER    aspirin chewable tablet 81 mg  81 mg Oral DAILY WITH BREAKFAST    losartan (COZAAR) tablet 25 mg  25 mg Oral DAILY    DULoxetine (CYMBALTA) capsule 60 mg  60 mg Oral DAILY    Lactobacillus Acidoph & Bulgar (FLORANEX) tablet 2 Tab  2 Tab Oral BID    [START ON 5/3/2017] amiodarone (CORDARONE) tablet 200 mg  200 mg Oral BID    WARFARIN INFORMATION NOTE (COUMADIN)   Other Q24H    gabapentin (NEURONTIN) capsule 100 mg  100 mg Oral BID    zinc sulfate (ZINCATE) capsule 220 mg  1 Cap Oral DAILY    atorvastatin (LIPITOR) tablet 40 mg  40 mg Oral QHS       Allergies:  No Known Allergies      Functional Progress:    OCCUPATIONAL THERAPY    ON ADMISSION MOST RECENT   Eating  Functional Level: 5   Eating  Functional Level: 7     Grooming  Functional Level: 7   Grooming  Functional Level: 7     Bathing  Functional Level: 2 (Pt stood in shower at [employer])   Bathing  Functional Level: 2 (Pt stood in shower at [employer])     Upper Body Dressing  Functional Level: 5   Upper Body Dressing  Functional Level: 5     Lower Body Dressing  Functional Level: 4   Lower Body Dressing  Functional Level: 4     Toileting  Functional Level: 4   Toileting  Functional Level: 4     Toilet Transfers  Toilet Transfer Score: 4   Toilet Transfers  Toilet Transfer Score: 4     Tub /Shower Transfers  Tub/Shower Transfer Score: 4   Tub/Shower Transfers  Tub/Shower Transfer Score: 4       Legend:   7 - Independent   6 - Modified Independent   5 - Standby Assistance / Supervision / Set-up   4 - Minimum Assistance / Contact Guard Assistance   3 - Moderate Assistance   2 - Maximum Assistance   1 - Total Assistance / Dependent       Lab/Data Review:  Recent Results (from the past 24 hour(s)) PROTHROMBIN TIME + INR    Collection Time: 04/25/17  6:15 AM   Result Value Ref Range    Prothrombin time 24.3 (H) 11.5 - 15.2 sec    INR 2.3 (H) 0.8 - 1.2         Estimated Glomerular Filtration Rate:  CKD-EPI:   On admission, estimated GFR was 107.9 mL/min/1.73m2 based on a Creatinine of 0.63 mg/dl. Most recent estimated GFR was 103.3 mL/min/1.73m2 based on a Creatinine of 0.70 mg/dl. MDRD:   On admission, estimated GFR was 138.5 mL/min/1.73m2 based on a Creatinine of 0.63 mg/dl. Most recent estimated GFR was 122.7 mL/min/1.73m2 based on a Creatinine of 0.70 mg/dl. Assessment:     Primary Rehabilitation Diagnosis  1. Impaired Mobility and ADLs  2. S/P Right axillary to bifemoral artery bypass using an 8 mm Propaten graft from the right axilla to the right common femoral artery with a jump femoral-femoral bypass with another 8 mm Propaten external ring bypass; Right common femoral artery, and profunda femoris artery and superficial femoral artery endarterectomy causing an extensive surgery on the right side (4/4/2017 - Dr. Willow Wong); S/P Cutdown of femoral-femoral bypass, more on the left groin side; Right lower extremity angiography with first-order catheterization (4/7/2017 - Dr. Willow Wong); S/P Right femoral to above-knee popliteal bypass with an 8 mm PTFE graft (4/10/2017 - Dr. Santana Gerard)  3.  History of critical ischemia of the right lower limb secondary to peripheral arterial disease      Comorbidities   Benign hypertensive heart disease with systolic congestive heart failure, NYHA class 2     History of vitamin B12 deficiency    DDD (degenerative disc disease), lumbar    Erectile dysfunction    Spinal stenosis of lumbar region with radiculopathy    Hereditary peripheral neuropathy    Aortoiliac occlusive disease     Atherosclerosis of native artery of both lower extremities with intermittent claudication     Peripheral artery disease     Coronary artery disease involving native coronary artery of native heart    Chronic atrial fibrillation     History of transient alteration of awareness, resolved    Acute blood loss as cause of postoperative anemia    Anticoagulated on Coumadin    Ischemic cardiomyopathy    Chronic systolic heart failure     Carotid artery disease     Dyslipidemia    History of hyperuricemia    Euthyroid sick syndrome    Aftercare following surgery of the circulatory system    History of cardioversion    Chronic ulcers of right foot    Vitamin D deficiency       Plan:     1. Justification for continued stay: Good progression towards established rehabilitation goals. 2. Medical Issues being followed closely:    [x]  Fall and safety precautions     [x]  Wound Care     [x]  Bowel and Bladder Function     [x]  Fluid Electrolyte and Nutrition Balance     [x]  Pain Control      3. Issues that 24 hour rehabilitation nursing is following:    [x]  Fall and safety precautions     [x]  Wound Care     [x]  Bowel and Bladder Function     [x]  Fluid Electrolyte and Nutrition Balance     [x]  Pain Control      [x]  Assistance with and education on in-room safety with transfers to and from the bed, wheelchair, toilet and shower. 4. Acute rehabilitation plan of care:    [x]  Continue current care and rehab. [x]  Physical Therapy           [x]  Occupational Therapy           []  Speech Therapy     []  Hold Rehab until further notice     5. Medications:    [x]  MAR Reviewed     [x]  Continue Present Medications     6. DVT Prophylaxis:      []  Lovenox     []  Unfractionated Heparin     [x]  Coumadin     []  NOAC     [x]  VASQUEZ Stockings     [x]  Sequential Compression Device     []  None     7.  Orders:   > S/P Right axillary to bifemoral artery bypass using an 8 mm Propaten graft from the right axilla to the right common femoral artery with a jump femoral-femoral bypass with another 8 mm Propaten external ring bypass; Right common femoral artery, and profunda femoris artery and superficial femoral artery endarterectomy causing an extensive surgery on the right side (4/4/2017 - Dr. Jovan Lama); S/P Cutdown of femoral-femoral bypass, more on the left groin side; Right lower extremity angiography with first-order catheterization (4/7/2017 - Dr. Jovan Lama); S/P Right femoral to above-knee popliteal bypass with an 8 mm PTFE graft (4/10/2017 - Dr. Bharat Weinbreg);  History of critical ischemia of the right lower limb secondary to peripheral arterial disease   > Continue:    > Aspirin 81 mg PO once daily with breakfast    > Atorvastatin 40 mg PO q HS    > Clopidogrel 75 mg PO once daily with dinner      > Acute Postoperative Blood Loss Anemia   > Hgb/Hct (3/15/2017, prior to admission to [facility]) = 14.3/40.8   > Hgb/Hct (4/4/2017, post-op) = 9.8/27.1    > Hgb/Hct (4/19/2017) = 8.2/23.7   > Hgb/Hct (4/23/2017, on admission) = 8.0/23.3   > Anemia work-up showed serum iron 21, TIBC 196, iron % saturation 11, ferritin 779, reticulocyte count 3.7   > Patient was started on FeSO4 and Ascorbic acid   > Hgb/Hct (4/24/2017) = 8.0/23.8   > On 4/24/2017, patient was given Epoetin constance 10,000 units SC x 1 dose      > Continue:    > FeSO4 325 mg PO once daily with breakfast     > Ascorbic Acid 250 mg PO once daily with breakfast (to enhance the absorption of the FeSO4)       > Benign hypertensive heart disease with chronic systolic heart failure   > Continue:    > Change Diltiazem  mg PO once daily (6AM) to Diltiazem 30 mg PO TID AC and qHS (7:30AM, 11:30AM, 4:30PM, 10PM)    > Decrease Metoprolol tartrate from 25 mg to 12.5 mg PO q 12 hr (9AM, 9PM)    > Losartan 25 mg PO once daily (change from 12PM to 9AM)      > Chronic atrial fibrillation, S/P Synchronized external cardioversion (4/18/2017 - Dr. Александр Moreno), anticoagulated on Coumadin   > Continue:    > Amiodarone 400 mg PO BID to complete 14 days (until 4/21/2017), then decrease to 200 mg PO BID afterwards    > Change Diltiazem  mg PO once daily (6AM) to Diltiazem 30 mg PO TID AC and qHS (7:30AM, 11:30AM, 4:30PM, 10PM)    > Decrease Metoprolol tartrate from 25 mg to 12.5 mg PO q 12 hr (9AM, 9PM)   > Target INR = 2.5 to 3.5   > Patient received Coumadin 3 mg PO last night   > INR 2.3   > Coumadin 3 mg PO tonight      > Chronic ulcer of right foot   > Heel suspension boot on both feet when supine in bed   > Wound care recommendations as per Podiatry: Betadine dressings, non compressive, with 4x4s, conform once daily   > Patient was started on Ascorbic acid and Zinc sulfate   > Continue:    > Ascorbic acid 250 mg PO once daily    > Zinc sulfate 220 mg PO once daily      > Sick euthyroid syndrome   > TSH (4/6/2017) = 4.77   > TSH (4/22/2017) = 5.08   > Free T4 (4/22/2017) = 1.2    > On 4/22/2017, patient was started on oral Levothyroxine 100 mcg PO once daily x 2 doses   > Continue Levothyroxine 50 mcg PO once daily x 2 doses, then decrease to 25 mcg PO once daily x 2 doses then discontinue      > History of hyperuricemia   > Uric acid (7/6/2016) = 9.8   > Uric acid (4/21/2017) = 2.9      > Vitamin D Deficiency   > Vitamin D 25-Hydroxy (4/22/2017) = 12.0   > Patient was given Cholecalciferol 50,000 units PO x 1 dose    > On 4/23/2017, patient was started on Cholecalciferol 5,000 units PO once daily   > Continue Cholecalciferol 5,000 units PO once daily      > Analgesia   > Continue:    > Acetaminophen 650 mg PO q 4 hr PRN for pain level less than 5/10    > Duloxetine 60 mg PO once daily    > Gabapentin 100 mg PO BID    > Percocet 10/325 1 tab PO q 4 hr PRN for pain level greater than 4/10       8. Patient's progress in rehabilitation and medical issues discussed with the patient. All questions answered to the best of my ability. Care plan discussed with patient and nurse.       Giorgio Cameron MD    April 25, 2017

## 2017-04-25 NOTE — PROGRESS NOTES
Problem: Mobility Impaired (Adult and Pediatric)  Goal: *Acute Goals and Plan of Care (Insert Text)  Physical Therapy Goals  Short Term Goals  Initiated 4/24/2017 and to be accomplished within 7 day(s) 5/1/17  1. Patient will move from supine to sit and sit to supine in bed with modified independence. 2. Patient will transfer from bed to chair and chair to bed with supervision/set-up using the least restrictive device. 3. Patient will perform sit to stand with supervision/set-up. 4. Patient will ambulate with supervision/set-up for 100 feet with the least restrictive device. 5. Patient will ascend/descend 4 stairs with one handrail(s) with supervision/set-up. Long Term Goals  Initiated 4/24/2017 and to be accomplished within 14 day(s) 5/8/17  1. Patient will move from supine to sit and sit to supine in bed with independence. 2. Patient will transfer from bed to chair and chair to bed with modified independence using the least restrictive device. 3. Patient will perform sit to stand with modified independence. 4. Patient will ambulate with modified independence for 150 feet with the least restrictive device. 5. Patient will ascend/descend 4 stairs with one handrail(s) with modified independence. PHYSICAL THERAPY DAILY NOTE  Patient Name:Mj Gutierrez  Time In: 0800  Time Out: 0900  Patient Seen For: Balance activities;Gait training;Patient education; Therapeutic exercise;Transfer training; Wheelchair mobility  Diagnosis: Debility   Status post femoral-popliteal bypass surgery Status post femoral-popliteal bypass surgery  Precautions: Fall risk     Subjective: Patient reports during ankle pumps, \"I can tell my ankle is already moving better today. \"     Pain at start of tx: 5/10 in right heel - reports pain medication given prior to treatment   Pain at stop of tx:7/10 in right heel; reports pain is tolerable.     Time In: 1430  Time Out: 1500  Patient Seen For: group activity, balance, transfers  Pain: 5/10; reported pain medication received prior to treatment. Patient identified with name and :Yes         Objective: FIM      GROSS ASSESSMENT Daily Assessment     AROM: Generally decreased, functional  PROM: Generally decreased, functional  Strength: Generally decreased, functional  Coordination: Within functional limits  Tone: Normal  Sensation: Intact          BED/MAT MOBILITY Daily Assessment     Rolling Right : 5 (Supervision)  Rolling Left : 5 (Supervision)  Supine to Sit : 5 (Supervision)  Sit to Supine : 5 (Supervision)     Supervision for safety near edge of mat table; verbal cues for breathing and decreased speed of movement for safety. TRANSFERS Daily Assessment     Sit to Stand Assistance: Contact guard assistance  Stand to sit: Contact guard  Sit<->stands - contact guard for safety; several trials throughout treatment; moderate verbal and manual cues for increasing anterior weight shift, UE positioning on bilateral thighs to promote increased lower extremity muscle activation, symmetrical foot position for equal weight bearing. On lower seat surface - patient demonstrated increased speed of descent - moderate cues to correct. Also patient presented with difficulty standing from wheelchair initially without UE support on armrests - patient had a few attempts before coming to a full stand; one occasion while sitting required verbal cues to let go of rolling walker for safety. Transfer Type: Other  Other: stand step with rolling walker  Transfer Assistance : 4 (Contact guard assistance) see sit<->stand for details; contact guard for safety; moderate cues to keep rolling walker on ground and increased step clearance for safety.            GAIT Daily Assessment     Amount of Assistance: 4 (Contact guard assistance) for safety; moderate verbal and manual cues for keeping rolling walker on the ground, increased left step clearance and distance in order to promote increased right LE weight bearing to equalize weight bearing. Patient demonstrates narrow base of support shifted to left with rolling walker shifted to left - minimal verbal cues and demonstration to correct. Patient presents with decreased velocity. Distance (ft): 50 Feet (ft) + four trials of 15ft  Assistive Device: Walker, rolling          BALANCE Daily Assessment     Sitting - Static: Good (unsupported) - no sway sitting edge of mat table; supervision   Sitting - Dynamic: Good (unsupported) - no sway sitting edge of mat table while rotating trunk and head; supervision   Standing - Static: Good - without support; contact guard for safety; increased weight shift to left. Standing - Dynamic : Impaired; participated in two trials of standing dynamic balance activity with unilateral right UE underhand throwing - close supervision for safety with intermittent contact guard for safety - one occasion of loss of balance to the right. Maximal verbal cues for equal positioning of LE's - tendency to stagger stance right in front. First trial 7 minutes, second trial 9 minutes. Completed with left UE support on rolling walker and intermittent right UE support. WHEELCHAIR MOBILITY Daily Assessment     Able to Propel (ft): 300 feet  Functional Level: 6; propels with bilateral upper extremities. Curbs/Ramps Assist Required (FIM Score): 1 (Total assistance) - NT  Wheelchair Setup Assist Required : 6 (Modified independent)  Wheelchair Management: Manages left brake;Manages right brake          LOWER EXTREMITY EXERCISES Daily Assessment     Extremity: Both  Exercise Type #1: Supine lower extremity strengthening - Straight let raise  Sets Performed: 3  Reps Performed: 10  Level of Assist: Supervision; cues for breathing      Exercise Type #2: Supine lower extremity range of motion - ankle pumps  1 set of 15  Supervision; right ankle grossly limited but functional ~ 15 degrees plantar flexion and 5 degrees dorsiflexion.  Left - not limited. Exercise Type #3: Supine lower extremity strengthening - Right quad sets  1 set of 15   Supervision; able to achieve full extension; cues for breathing. Exercise Type #4: Supine lower extremity strengthening - bridges  3 sets of 10   Supervision for cues on technique and lower extremity positioning. Exercise Type #5: Standing lower extremity strengthening - single leg stance with contralateral toe tapping on 4 inch step  2 sets of 10 each LE  With Right stance - patient required bilateral UE support on rolling walker - cues for loose ; with left stance - patient required single UE support; contact guard for safety throughout exercise - one occasion of loss of balance during initial repetition with left stance. Assessment: Patient demonstrates progression today as evident by ability to complete sit<->stand transfers with appropriate technique and moderate verbal cues. Patient is presenting with decreased right lower extremity strength and increased pain resulting in deficits with bed mobility, transfers, and ambulation requiring assistance and cueing for safety. Patient will benefit from continued skilled PT in order to maximize safety and function. Plan of Care: Progress transfers by repetition to promote increased lower extremity strengthening as well as promote safe technique with minimal cueing for increased independence. Progress standing strengthening exercises to promote increased right LE weight bearing.       Indiana Hernandez, SPT  4/25/2017

## 2017-04-25 NOTE — PROGRESS NOTES
Recreation Therapy Progress Note    Patient Name: Jayleen Mclean  Patient Age: 59 y.o. Past Medical History:   Past Medical History:   Diagnosis Date    Acute blood loss as cause of postoperative anemia 2017    Anticoagulated on Coumadin     Aortoiliac occlusive disease (HCC) 2017    Atherosclerosis of native artery of both lower extremities with intermittent claudication ([facility] 2017    Benign hypertensive heart disease with systolic congestive heart failure, NYHA class 2 ([facility] [address] [facility] 2015    Carotid artery disease (HCC)     Chronic atrial fibrillation (HCC)     Chronic systolic heart failure (HCC)     Chronic ulcer of right foot ([facility] [address] 2017    Coronary artery disease involving native coronary artery of native heart 3/15/2017    Successful stenting of Cx (Xience LESLEY) and RCA (Xience LESLEY) to 0% by Dr. Sol Smyth on 3/15/17.  DDD (degenerative disc disease), lumbar 2015    Dyslipidemia     Erectile dysfunction 2016    Euthyroid sick syndrome 2017    Hereditary peripheral neuropathy 11/15/2016    History of cardioversion 2017    S/P Synchronized external cardioversion (2017 - Dr. Martha Lara)    Hyperuricemia     Ischemic cardiomyopathy     Peripheral artery disease ([facility] [address] 2017    Spinal stenosis of lumbar region with radiculopathy 2015    Dr. Franne Primrose Vitamin D deficiency 2017    Vitamin D 25-Hydroxy (2017) = 12.0       Medical Diagnosis:  Debility   Status post femoral-popliteal bypass surgery Status post femoral-popliteal bypass surgery         Patient identified with name and :yes wristband    Time in:1430  Time out:1500  Pt was seen for recreation therapy intervention to address static standing balance while participating in Wii bowling with PT. Pt was able to tolerate standing for two trials of 7-8 minutes while participating in bowling.  Pt was able to tolerate standing without support of RW and was able to maintain standing balance. Pt did have one incident where he hit the game controller on the arm rest of the w/c. Pt reported that he did not hit his and and there was no evidence injury. Pt appeared to enjoy participating and was able to complete task with supervision and vc for even foot placement. Pt appeared to enjoy socializing with staff and peers throughout the session. Plan of Care: Follow up with patient to offer education and interventions for return to premorbid leisure participation to include, but not limited to: adaptive strategies, energy conservation techniques, and safety awareness during leisure participation.         Debbie Ayala, CTRS  4/25/2017

## 2017-04-26 LAB
INR PPP: 2.2 (ref 0.8–1.2)
PROTHROMBIN TIME: 23.6 SEC (ref 11.5–15.2)

## 2017-04-26 PROCEDURE — 97110 THERAPEUTIC EXERCISES: CPT

## 2017-04-26 PROCEDURE — 97150 GROUP THERAPEUTIC PROCEDURES: CPT

## 2017-04-26 PROCEDURE — 97530 THERAPEUTIC ACTIVITIES: CPT

## 2017-04-26 PROCEDURE — 36415 COLL VENOUS BLD VENIPUNCTURE: CPT | Performed by: INTERNAL MEDICINE

## 2017-04-26 PROCEDURE — 97116 GAIT TRAINING THERAPY: CPT

## 2017-04-26 PROCEDURE — 74011250637 HC RX REV CODE- 250/637: Performed by: INTERNAL MEDICINE

## 2017-04-26 PROCEDURE — 97535 SELF CARE MNGMENT TRAINING: CPT

## 2017-04-26 PROCEDURE — A6209 FOAM DRSG <=16 SQ IN W/O BDR: HCPCS

## 2017-04-26 PROCEDURE — 65310000000 HC RM PRIVATE REHAB

## 2017-04-26 PROCEDURE — 85610 PROTHROMBIN TIME: CPT | Performed by: INTERNAL MEDICINE

## 2017-04-26 RX ORDER — WARFARIN SODIUM 5 MG/1
5 TABLET ORAL
Status: COMPLETED | OUTPATIENT
Start: 2017-04-26 | End: 2017-04-26

## 2017-04-26 RX ADMIN — LACTOBACILLUS TAB 2 TABLET: TAB at 08:41

## 2017-04-26 RX ADMIN — DILTIAZEM HYDROCHLORIDE 30 MG: 30 TABLET, FILM COATED ORAL at 06:32

## 2017-04-26 RX ADMIN — DILTIAZEM HYDROCHLORIDE 30 MG: 30 TABLET, FILM COATED ORAL at 22:08

## 2017-04-26 RX ADMIN — GABAPENTIN 100 MG: 100 CAPSULE ORAL at 17:31

## 2017-04-26 RX ADMIN — DULOXETINE HYDROCHLORIDE 60 MG: 60 CAPSULE, DELAYED RELEASE ORAL at 08:41

## 2017-04-26 RX ADMIN — ZINC SULFATE 220 MG (50 MG) CAPSULE 220 MG: CAPSULE at 08:40

## 2017-04-26 RX ADMIN — DOCUSATE SODIUM 100 MG: 100 CAPSULE, LIQUID FILLED ORAL at 17:31

## 2017-04-26 RX ADMIN — OXYCODONE HYDROCHLORIDE AND ACETAMINOPHEN 1 TABLET: 10; 325 TABLET ORAL at 11:40

## 2017-04-26 RX ADMIN — ASPIRIN 81 MG CHEWABLE TABLET 81 MG: 81 TABLET CHEWABLE at 08:41

## 2017-04-26 RX ADMIN — LACTOBACILLUS TAB 2 TABLET: TAB at 17:31

## 2017-04-26 RX ADMIN — WARFARIN SODIUM 5 MG: 5 TABLET ORAL at 12:26

## 2017-04-26 RX ADMIN — METOPROLOL TARTRATE 12.5 MG: 25 TABLET ORAL at 22:08

## 2017-04-26 RX ADMIN — AMIODARONE HYDROCHLORIDE 400 MG: 200 TABLET ORAL at 17:31

## 2017-04-26 RX ADMIN — LEVOTHYROXINE SODIUM 25 MCG: 25 TABLET ORAL at 08:40

## 2017-04-26 RX ADMIN — FERROUS SULFATE TAB 325 MG (65 MG ELEMENTAL FE) 325 MG: 325 (65 FE) TAB at 08:40

## 2017-04-26 RX ADMIN — DILTIAZEM HYDROCHLORIDE 30 MG: 30 TABLET, FILM COATED ORAL at 17:31

## 2017-04-26 RX ADMIN — DOCUSATE SODIUM 100 MG: 100 CAPSULE, LIQUID FILLED ORAL at 08:41

## 2017-04-26 RX ADMIN — ATORVASTATIN CALCIUM 40 MG: 40 TABLET, FILM COATED ORAL at 22:09

## 2017-04-26 RX ADMIN — Medication 5000 UNITS: at 08:40

## 2017-04-26 RX ADMIN — GABAPENTIN 100 MG: 100 CAPSULE ORAL at 08:39

## 2017-04-26 RX ADMIN — Medication 250 MG: at 08:41

## 2017-04-26 RX ADMIN — OXYCODONE HYDROCHLORIDE AND ACETAMINOPHEN 1 TABLET: 10; 325 TABLET ORAL at 02:36

## 2017-04-26 RX ADMIN — AMIODARONE HYDROCHLORIDE 400 MG: 200 TABLET ORAL at 08:40

## 2017-04-26 RX ADMIN — OXYCODONE HYDROCHLORIDE AND ACETAMINOPHEN 1 TABLET: 10; 325 TABLET ORAL at 19:37

## 2017-04-26 RX ADMIN — OXYCODONE HYDROCHLORIDE AND ACETAMINOPHEN 1 TABLET: 10; 325 TABLET ORAL at 15:55

## 2017-04-26 RX ADMIN — CLOPIDOGREL BISULFATE 75 MG: 75 TABLET, FILM COATED ORAL at 17:30

## 2017-04-26 RX ADMIN — OXYCODONE HYDROCHLORIDE AND ACETAMINOPHEN 1 TABLET: 10; 325 TABLET ORAL at 06:31

## 2017-04-26 RX ADMIN — DILTIAZEM HYDROCHLORIDE 30 MG: 30 TABLET, FILM COATED ORAL at 11:41

## 2017-04-26 NOTE — PROGRESS NOTES
[x] Psychology  [] Social Work [] Recreational Therapy    INTERVENTION  UNITS/TIME OF SERVICE   Assessment    Supportive Counseling April 25, 2017   Orientation    Discharge Planning    Resource Linkage              Progress/Current Status    Individual support  with patient on ARU this date. Patient observed sitting comfortably in wheelchair and in no apparent distress. Patient feels that he has made a good integration into treatment milieu and insists that he remains motivated to improve. Patient again cautioned about safety and pace in his recovery, as he is tending to be (possibly) in denial about specific areas of need and that he needs to carefully attend to all safety parameters in his recovery. This is a gentleman who wants to return to work as soon as possible; yet, return to work issues seem very premature at this point, especially given that he is a . Further stroke education will help him to better identify realistic goals for himself in recovery and further understand his personal risk factors that he can alter for better health maintenance and stability.     Navneet Ramachandran, PHD 4/26/2017 11:02 AM

## 2017-04-26 NOTE — PROGRESS NOTES
Problem: Mobility Impaired (Adult and Pediatric)  Goal: *Acute Goals and Plan of Care (Insert Text)  Physical Therapy Goals  Short Term Goals  Initiated 4/24/2017 and to be accomplished within 7 day(s) 5/1/17  1. Patient will move from supine to sit and sit to supine in bed with modified independence. 2. Patient will transfer from bed to chair and chair to bed with supervision/set-up using the least restrictive device. 3. Patient will perform sit to stand with supervision/set-up. 4. Patient will ambulate with supervision/set-up for 100 feet with the least restrictive device. 5. Patient will ascend/descend 4 stairs with one handrail(s) with supervision/set-up. Long Term Goals  Initiated 4/24/2017 and to be accomplished within 14 day(s) 5/8/17  1. Patient will move from supine to sit and sit to supine in bed with independence. 2. Patient will transfer from bed to chair and chair to bed with modified independence using the least restrictive device. 3. Patient will perform sit to stand with modified independence. 4. Patient will ambulate with modified independence for 150 feet with the least restrictive device. 5. Patient will ascend/descend 4 stairs with one handrail(s) with modified independence. PHYSICAL THERAPY DAILY NOTE  Patient Name:Mj Craig  Time In: 1000  Time Out: 9464  Patient Seen For: Balance activities; Patient education;Gait training;Transfer training  Diagnosis: Debility   Status post femoral-popliteal bypass surgery Status post femoral-popliteal bypass surgery  Precautions: Fall Risk Precautions, WBAT     Subjective: Pt is pleasant and cooperative throughout treatment. Pain at start of tx: 5/10 R LE  Pain at stop of tx: 6-7/10 R LE with weight bearing - pt reports he had received pain medication earlier in the morning. Time In: 1430  Time Out: 1530  Patient Seen For: 30 minute group activity to address standing balance and therapeutic exercises.     Patient continues to report R LE/heel pain indicating he had again received pain medication prior to treatment. Patient identified with name and : yes         Objective:       BED/MAT MOBILITY Daily Assessment    Supine <> Sit on Mat table:  Modified independence with increased time to perform. TRANSFERS Daily Assessment     Other: stand step with RW  Transfer Assistance : 5 (Supervision/setup)  Sit to Stand Assistance: Supervision   Pt requires supervision for safety with reminders for B foot placement to decrease compensation and encouragement to perform sit <> stand with B hands on B knees as opposed to utilizing B UEs on arm rests. GAIT Daily Assessment     Amount of Assistance: 5 (Supervision/setup)  Distance (ft): 200 Feet (ft)  Assistive Device: Walker, rolling   Pt ambulates with antalgic gait pattern with decreased R stance time and weight shift but is receptive with intermittent verbal cuing to equalize step length. Pt with one minor LOB to L when cued for forward gaze which pt self corrected with stepping strategy. STEPS or STAIRS Daily Assessment     Pt negotiated 4 (6\") steps with UE support on R handrail. Pt negotiates stairs with step to step pattern with L UE leading ascending and R LE leading descending. Pt demonstrates R to L trunk rotation with descending stairs with pt reporting L calf \"tight,\"ness limiting his ability to descend without rotating. Pt requires close supervision for safety throughout with mod verbal cues for slower pace for safety. BALANCE Daily Assessment     Sitting - Static: Good (unsupported)  Sitting - Dynamic: Good (unsupported)  Standing - Static: Fair  Standing - Dynamic : Impaired   Pt participated in 30 minute standing group activity with pt standing for one trial of 9 minutes 30 seconds and on trial of 8 minutes to participate in bimanual task.   Pt required supervision for safety with static standing without AD with pt requiring intermittent verbal cuing to attempt to maintain equal B LE weight bearing as pt compensates with L lateral weight shift with prolonged standing. WHEELCHAIR MOBILITY Daily Assessment     Able to Propel (ft): 250 feet  Functional Level: 6   Pt propels w/c with B UEs with modified independence. LOWER EXTREMITY EXERCISES Daily Assessment     Supine LE Therapeutic Exercises:  2 Sets of 10 Repetitions:  Isometric Quad Sets x 5\" holds  B 2# LAQ  Pt requires distant supervision with minimal verbal cues for quality of exercises. Assessment: Pt is progressing with functional mobility requiring decreased assistance from CGA to supervision with functional transfers and ambulation. Pt continues to be limited by R LE pain and decreased ROM. Plan of Care: Continue with current POC. Educate pt re: HEP for ankle DF stretch bilaterally for increased independence and safety with functional transfers, gait, and stair negotiation.      Marni Garsia, PT, DPT  4/26/2017

## 2017-04-26 NOTE — PROGRESS NOTES
[facility]    Bedside and Verbal shift change report given to  Ce Hoffman RN oncoming nurse) by Norberto Portillo RN   (offgoing nurse). Report included the following information SBAR, Kardex, MAR and Recent Results.     Situation:   Code Status: Full Code   Reason for Admission: [address] Day: 5   Problem List:   Hospital Problems  Date Reviewed: 4/25/2017          Codes Class Noted POA    Vitamin D deficiency (Chronic) ICD-10-CM: E55.9  ICD-9-CM: 268.9  4/22/2017 Yes    Overview Signed 4/22/2017 12:56 PM by Juwan Mckeon MD     Vitamin D 25-Hydroxy (4/22/2017) = 12.0             Anticoagulated on Coumadin (Chronic) ICD-10-CM: Z51.81, Z79.01  ICD-9-CM: V58.83, V58.61  Unknown Yes        Hyperuricemia (Chronic) ICD-10-CM: E79.0  ICD-9-CM: 790.6  Unknown Yes        Aftercare following surgery of the circulatory system ICD-10-CM: Q38.895  ICD-9-CM: V58.73  4/21/2017 Yes    Overview Signed 4/21/2017  2:32 PM by Juwan Mckeon MD     S/P Right axillary to bifemoral artery bypass using an 8 mm Propaten graft from the right axilla to the right common femoral artery with a jump femoral-femoral bypass with another 8 mm Propaten external ring bypass; Right common femoral artery, and profunda femoris artery and superficial femoral artery endarterectomy causing an extensive surgery on the right side (4/4/2017 - Dr. Brandt Jennings)    S/P Cutdown of femoral-femoral bypass, more on the left groin side; Right lower extremity angiography with first-order catheterization (4/7/2017 - Dr. Brandt Jennings)    S/P Right femoral to above-knee popliteal bypass with an 8 mm PTFE graft (4/10/2017 - Dr. Romel Jensen)             * (Principal)Status post femoral-popliteal bypass surgery ICD-10-CM: Z95.828  ICD-9-CM: V45.89  4/21/2017 Yes    Overview Signed 4/21/2017  2:33 PM by Juwan Mckeon MD     S/P Right axillary to bifemoral artery bypass using an 8 mm Propaten graft from the right axilla to the right common femoral artery with a jump femoral-femoral bypass with another 8 mm Propaten external ring bypass; Right common femoral artery, and profunda femoris artery and superficial femoral artery endarterectomy causing an extensive surgery on the right side (4/4/2017 - Dr. Helder Martin)    S/P Cutdown of femoral-femoral bypass, more on the left groin side; Right lower extremity angiography with first-order catheterization (4/7/2017 - Dr. Helder Martin)    S/P Right femoral to above-knee popliteal bypass with an 8 mm PTFE graft (4/10/2017 - Dr. Sidney Strauss)             History of cardioversion ICD-10-CM: Z98.890  ICD-9-CM: V15.1  4/18/2017 Yes    Overview Signed 4/21/2017  2:34 PM by Bobo Garcia MD     S/P Synchronized external cardioversion (4/18/2017 - Dr. Coy Ramirez)             Chronic atrial fibrillation ([facility] (Chronic) ICD-10-CM: V90.4  ICD-9-CM: 427.31  Unknown Yes        Critical ischemia of lower extremity ICD-10-CM: I99.8  ICD-9-CM: 459.9  4/10/2017 No    Overview Signed 4/21/2017  4:41 PM by Bobo Garcia MD     Right lower limb             Euthyroid sick syndrome ICD-10-CM: E07.81  ICD-9-CM: 790.94  4/6/2017 Yes        Acute blood loss as cause of postoperative anemia ICD-10-CM: D62  ICD-9-CM: 285.1  4/4/2017 Yes        Impaired mobility and ADLs ICD-10-CM: Z74.09  ICD-9-CM: 799.89  4/4/2017 Yes        Chronic ulcer of right foot (HCC) (Chronic) ICD-10-CM: L97.519  ICD-9-CM: 707.15  4/4/2017 Yes        Aortoiliac occlusive disease ([facility] (Chronic) ICD-10-CM: Z54.79  ICD-9-CM: 444.09  1/25/2017 Yes        Atherosclerosis of native artery of both lower extremities with intermittent claudication (HCC) (Chronic) ICD-10-CM: H92.140  ICD-9-CM: 440.21  1/25/2017 Yes        Peripheral artery disease (HCC) (Chronic) ICD-10-CM: I73.9  ICD-9-CM: 443.9  1/25/2017 Yes        Benign hypertensive heart disease with systolic congestive heart failure, NYHA class 2 (HCC) (Chronic) ICD-10-CM: I11.0, I50.20  ICD-9-CM: 402.11, 428.20, 428.0  1/26/2015 Yes              Background:   Past Medical History:   Past Medical History:   Diagnosis Date    Acute blood loss as cause of postoperative anemia 4/4/2017    Anticoagulated on Coumadin     [facility] 1/25/2017    Atherosclerosis of native artery of both lower extremities with intermittent claudication ([facility] 1/25/2017    Benign hypertensive heart disease with systolic congestive heart failure, NYHA class 2 ([facility] 1/26/2015    Carotid artery disease (HCC)     Chronic atrial fibrillation (HCC)     Chronic systolic heart failure (HCC)     Chronic ulcer of right foot ([facility] 4/4/2017    Coronary artery disease involving native coronary artery of native heart 3/15/2017    Successful stenting of Cx (Xience LESELY) and RCA (Xience LESLEY) to 0% by Dr. Dulce Reyes on 3/15/17.     DDD (degenerative disc disease), lumbar 1/26/2015    Dyslipidemia     Erectile dysfunction 7/5/2016    Euthyroid sick syndrome 4/6/2017    Hereditary peripheral neuropathy 11/15/2016    History of cardioversion 4/18/2017    S/P Synchronized external cardioversion (4/18/2017 - Dr. Sridhar Romero)    Hyperuricemia     Ischemic cardiomyopathy     Peripheral artery disease ([facility] 1/25/2017    Spinal stenosis of lumbar region with radiculopathy 5/4/2015    Dr. Renetta Cabrera Vitamin D deficiency 4/22/2017    Vitamin D 25-Hydroxy (4/22/2017) = 12.0      Patient taking anticoagulants YES   Patient has a defibrillator: no     Assessment:   Changes in Assessment throughout shift: NONE                     Last Vitals:     Vitals:    04/25/17 0744 04/25/17 1600 04/25/17 2103 04/26/17 0632   BP: 97/59 132/74 110/69 112/74   Pulse: 68 81 87 78   Resp: 20 20 19    Temp: 97.1 °F (36.2 °C) 98 °F (36.7 °C) 97.7 °F (36.5 °C)    SpO2: 99% 98% 100%         PAIN    Pain Assessment    Pain Intensity 1: 7 (04/26/17 0701) Pain Intensity 1: 2 (12/29/14 1105)    Pain Location 1: Foot Pain Location 1: Abdomen    Pain Intervention(s) 1: Medication (see MAR) Pain Intervention(s) 1: Medication (see MAR)  Patient Stated Pain Goal: 0 Patient Stated Pain Goal: 0  o Intervention effective: YES   o Other actions taken for pain: MEDICATION     Skin Assessment  Skin color Skin Color: Appropriate for ethnicity  Condition/Temperature Skin Condition/Temp: Dry  Integrity Skin Integrity: Incision (comment)  Turgor Turgor: Non-tenting  Weekly Pressure Ulcer Documentation  Pressure  Injury Documentation: No Pressure Injury Noted-Pressure Ulcer Prevention Initiated  Wound Prevention & Protection Methods  Orientation of wound Orientation of Wound Prevention: Posterior  Location of Prevention Location of Wound Prevention: Sacrum/Coccyx  Dressing Present Dressing Present : Yes  Dressing Status Dressing Status: Intact  Wound Offloading Wound Offloading (Prevention Methods): Bed, pressure redistribution/air     INTAKE/OUPUT    Date 04/25/17 0700 - 04/26/17 0659 04/26/17 0700 - 04/27/17 0659   Shift 8545-5601 0732-9390 24 Hour Total 5612-0469 4007-2476 24 Hour Total   I  N  T  A  K  E   P. O. 720  720         P. O. 720  720       Shift Total 720  720      O  U  T  P  U  T   Urine 500 2600 3100         Urine Voided 500 2600 3100         Urine Occurrence(s) 5 x 5 x 10 x       Stool            Stool Occurrence(s) 1 x 0 x 1 x       Shift Total 500 2600 3100       -2600 -2380      Weight (kg)             Recommendations:  1. Patient needs and requests: TOILETING    2. Diet: CARDIAC REG    3. Pending tests/procedures: LABS    4. Functional Level/Equipment: wheelchair    5. Estimated Discharge Date: TBD Posted on Whiteboard in Patients Room: no     [facility] Safety Check    A safety check occurred in the patient's room between off going nurse and oncoming nurse listed above.     The safety check included the below items  Area Items   H  High Alert Medications - Verify all high alert medication drips (heparin, PCA, etc.)   E  Equipment - Suction is set up for ALL patients (with cailin)  - Red plugs utilized for all equipment (IV pumps, etc.)  - WOWs wiped down at end of shift.  - Room stocked with oxygen, suction, and other unit-specific supplies   A  Alarms - Bed alarm is set for fall risk patients  - Ensure chair alarm is in place and activated if patient is up in a chair   L  Lines - Check IV for any infiltration  - Jiang bag is empty if patient has a Jiang   - Tubing and IV bags are labeled   S  Safety   - Room is clean, patient is clean, and equipment is clean. - Hallways are clear from equipment besides carts. - Fall bracelet on for fall risk patients  - Ensure room is clear and free of clutter  - Suction is set up for ALL patients (with cailin)  - Hallways are clear from equipment besides carts.    - Isolation precautions followed, supplies available outside room, sign posted

## 2017-04-26 NOTE — PROGRESS NOTES
[facility] PHYSICAL [address], [address]     INPATIENT REHABILITATION  DAILY PROGRESS NOTE     Date: 4/26/2017    Name: Kamila Gonzales Age / Sex: 59 y.o. / male   CSN: [id number] MRN: [id number]   [facility] Date: 4/21/2017 Length of Stay: 5 days     Primary Rehab Diagnosis: Impaired Mobility and ADLs secondary to:  1. S/P Right axillary to bifemoral artery bypass using an 8 mm Propaten graft from the right axilla to the right common femoral artery with a jump femoral-femoral bypass with another 8 mm Propaten external ring bypass; Right common femoral artery, and profunda femoris artery and superficial femoral artery endarterectomy causing an extensive surgery on the right side (4/4/2017 - Dr. Jayla Mondragon); S/P Cutdown of femoral-femoral bypass, more on the left groin side; Right lower extremity angiography with first-order catheterization (4/7/2017 - Dr. Jayla Mondragon); S/P Right femoral to above-knee popliteal bypass with an 8 mm PTFE graft (4/10/2017 - Dr. Randolph Cronin)  2. History of critical ischemia of the right lower limb secondary to peripheral arterial disease      Subjective:     Patient seen and examined. Blood pressure controlled. Losartan and Metoprolol tartrate were not given this AM due to BP parameters.      Patient's Complaint:   No significant medical complaints    Pain Control: stable, mild-to-moderate joint symptoms intermittently, reasonably well controlled by current meds      Objective:     Vital Signs:  Patient Vitals for the past 24 hrs:   BP Temp Pulse Resp SpO2   04/26/17 1141 111/62 - - - -   04/26/17 0737 118/70 96.6 °F (35.9 °C) 77 18 100 %   04/26/17 0632 112/74 - 78 - -   04/25/17 2103 110/69 97.7 °F (36.5 °C) 87 19 100 %   04/25/17 1600 132/74 98 °F (36.7 °C) 81 20 98 %        Physical Examination:  GENERAL SURVEY: Patient is awake, alert, oriented x 3, sitting comfortably on the chair, not in acute respiratory distress. HEENT: pale palpebral conjunctivae, anicteric sclerae, no nasoaural discharge, moist oral mucosa  NECK: supple, no jugular venous distention, no palpable lymph nodes  CHEST/LUNGS: symmetrical chest expansion, good air entry, clear breath sounds  HEART: adynamic precordium, good S1 S2, no S3, regular rhythm, no murmurs  ABDOMEN: flat, bowel sounds appreciated, soft, non-tender  EXTREMITIES: (+) necrotic areas noted on plantar surface of right heel, lateral surface of right calf area and nailbase of right great toe, pale nailbeds, (+) Grade 1-2 edema on right leg, full and equal pulses, no calf tenderness   NEUROLOGICAL EXAM: The patient is awake, alert and oriented x3, able to answer questions fairly appropriately, able to follow 1 and 2 step commands. Able to tell time from the wall clock. Cranial nerves II-XII are grossly intact. No gross sensory deficit.  Motor strength is 4+/5 on BUE and the LLE, 4- to 4/5 on the RLE (except for 2-/5 on the right ankle and 1/5 on toes of right foot).     Post-op Incision(s): healing well, clean, dry, and intact      Current Medications:  Current Facility-Administered Medications   Medication Dose Route Frequency    losartan (COZAAR) tablet 25 mg  25 mg Oral DAILY    metoprolol tartrate (LOPRESSOR) tablet 12.5 mg  12.5 mg Oral Q12H    dilTIAZem (CARDIZEM) IR tablet 30 mg  30 mg Oral AC&HS    pneumococcal 23-valent (PNEUMOVAX 23) injection 0.5 mL  0.5 mL IntraMUSCular PRIOR TO DISCHARGE    levothyroxine (SYNTHROID) tablet 25 mcg  25 mcg Oral ACB    cholecalciferol (VITAMIN D3) capsule 5,000 Units  5,000 Units Oral DAILY    ferrous sulfate tablet 325 mg  1 Tab Oral DAILY WITH BREAKFAST    ascorbic acid (vitamin C) (VITAMIN C) tablet 250 mg  250 mg Oral DAILY WITH BREAKFAST    acetaminophen (TYLENOL) tablet 650 mg  650 mg Oral Q4H PRN    docusate sodium (COLACE) capsule 100 mg  100 mg Oral BID    bisacodyl (DULCOLAX) tablet 10 mg  10 mg Oral Q48H PRN    amiodarone (CORDARONE) tablet 400 mg  400 mg Oral BID    oxyCODONE-acetaminophen (PERCOCET 10)  mg per tablet 1 Tab  1 Tab Oral Q4H PRN    clopidogrel (PLAVIX) tablet 75 mg  75 mg Oral DAILY WITH DINNER    aspirin chewable tablet 81 mg  81 mg Oral DAILY WITH BREAKFAST    DULoxetine (CYMBALTA) capsule 60 mg  60 mg Oral DAILY    Lactobacillus Acidoph & Bulgar (FLORANEX) tablet 2 Tab  2 Tab Oral BID    [START ON 5/3/2017] amiodarone (CORDARONE) tablet 200 mg  200 mg Oral BID    WARFARIN INFORMATION NOTE (COUMADIN)   Other Q24H    gabapentin (NEURONTIN) capsule 100 mg  100 mg Oral BID    zinc sulfate (ZINCATE) capsule 220 mg  1 Cap Oral DAILY    atorvastatin (LIPITOR) tablet 40 mg  40 mg Oral QHS       Allergies:  No Known Allergies      Functional Progress:    PHYSICAL THERAPY    ON ADMISSION MOST RECENT   Wheelchair Mobility/Management  Able to Propel (ft): 300 feet  Functional Level: 6  Curbs/Ramps Assist Required (FIM Score): 1 (Total assistance)  Wheelchair Setup Assist Required : 6 (Modified independent)  Wheelchair Management: Manages left brake, Manages right brake Wheelchair Mobility/Management  Able to Propel (ft): 250 feet  Functional Level: 6  Curbs/Ramps Assist Required (FIM Score): 1 (Total assistance)  Wheelchair Setup Assist Required : 6 (Modified independent)  Wheelchair Management: Manages left brake, Manages right brake     Gait  Amount of Assistance: 4 (Contact guard assistance)  Distance (ft): 25 Feet (ft)  Assistive Device: Walker, rolling Gait  Amount of Assistance: 5 (Supervision/setup)  Distance (ft): 200 Feet (ft)  Assistive Device: Walker, rolling     Balance-Sitting/Standing  Sitting - Static: Good (unsupported)  Sitting - Dynamic: Good (unsupported)  Standing - Static: Good  Standing - Dynamic : Impaired Balance-Sitting/Standing  Sitting - Static: Good (unsupported)  Sitting - Dynamic: Good (unsupported)  Standing - Static: Fair  Standing - Dynamic : Impaired     Bed/Mat Mobility  Rolling Right : 5 (Supervision)  Rolling Left : 5 (Supervision)  Supine to Sit : 5 (Supervision)  Sit to Supine : 5 (Supervision) Bed/Mat Mobility  Rolling Right : 5 (Supervision)  Rolling Left : 5 (Supervision)  Supine to Sit : 5 (Supervision)  Sit to Supine : 5 (Supervision)     Transfers  Transfer Type: Other  Other: stand step without AD  Transfer Assistance : 4 (Contact guard assistance)  Sit to Stand Assistance: Contact guard assistance Transfers  Transfer Type: Other  Other: stand step with RW  Transfer Assistance : 5 (Supervision/setup)  Sit to Stand Assistance: Supervision     Steps or Stairs  Steps/Stairs Ambulated (#): 4  Level of Assist : 4 (Contact guard assistance)  Rail Use: Both Steps or Stairs  Steps/Stairs Ambulated (#): 4  Level of Assist : 4 (Contact guard assistance)  Rail Use: Both         Lab/Data Review:  Recent Results (from the past 24 hour(s))   PROTHROMBIN TIME + INR    Collection Time: 04/26/17  6:20 AM   Result Value Ref Range    Prothrombin time 23.6 (H) 11.5 - 15.2 sec    INR 2.2 (H) 0.8 - 1.2         Estimated Glomerular Filtration Rate:  CKD-EPI:   On admission, estimated GFR was 107.9 mL/min/1.73m2 based on a Creatinine of 0.63 mg/dl. Most recent estimated GFR was 103.3 mL/min/1.73m2 based on a Creatinine of 0.70 mg/dl. MDRD:   On admission, estimated GFR was 138.5 mL/min/1.73m2 based on a Creatinine of 0.63 mg/dl. Most recent estimated GFR was 122.7 mL/min/1.73m2 based on a Creatinine of 0.70 mg/dl. Assessment:     Primary Rehabilitation Diagnosis  1. Impaired Mobility and ADLs  2.  S/P Right axillary to bifemoral artery bypass using an 8 mm Propaten graft from the right axilla to the right common femoral artery with a jump femoral-femoral bypass with another 8 mm Propaten external ring bypass; Right common femoral artery, and profunda femoris artery and superficial femoral artery endarterectomy causing an extensive surgery on the right side (4/4/2017 -  Linda Hannah); S/P Cutdown of femoral-femoral bypass, more on the left groin side; Right lower extremity angiography with first-order catheterization (4/7/2017 - Dr. Linda Hannah); S/P Right femoral to above-knee popliteal bypass with an 8 mm PTFE graft (4/10/2017 - Dr. Jany Davis)  3. History of critical ischemia of the right lower limb secondary to peripheral arterial disease      Comorbidities   Benign hypertensive heart disease with systolic congestive heart failure, NYHA class 2     History of vitamin B12 deficiency    DDD (degenerative disc disease), lumbar    Erectile dysfunction    Spinal stenosis of lumbar region with radiculopathy    Hereditary peripheral neuropathy    Aortoiliac occlusive disease     Atherosclerosis of native artery of both lower extremities with intermittent claudication     Peripheral artery disease     Coronary artery disease involving native coronary artery of native heart    Chronic atrial fibrillation     History of transient alteration of awareness, resolved    Acute blood loss as cause of postoperative anemia    Anticoagulated on Coumadin    Ischemic cardiomyopathy    Chronic systolic heart failure     Carotid artery disease     Dyslipidemia    History of hyperuricemia    Euthyroid sick syndrome    Aftercare following surgery of the circulatory system    History of cardioversion    Chronic ulcers of right foot    Vitamin D deficiency       Plan:     1. Justification for continued stay: Good progression towards established rehabilitation goals. 2. Medical Issues being followed closely:    [x]  Fall and safety precautions     [x]  Wound Care     [x]  Bowel and Bladder Function     [x]  Fluid Electrolyte and Nutrition Balance     [x]  Pain Control      3.  Issues that 24 hour rehabilitation nursing is following:    [x]  Fall and safety precautions     [x]  Wound Care     [x]  Bowel and Bladder Function     [x]  Fluid Electrolyte and Nutrition Balance     [x]  Pain Control      [x]  Assistance with and education on in-room safety with transfers to and from the bed, wheelchair, toilet and shower. 4. Acute rehabilitation plan of care:    [x]  Continue current care and rehab. [x]  Physical Therapy           [x]  Occupational Therapy           []  Speech Therapy     []  Hold Rehab until further notice     5. Medications:    [x]  MAR Reviewed     [x]  Continue Present Medications     6. DVT Prophylaxis:      []  Lovenox     []  Unfractionated Heparin     [x]  Coumadin     []  NOAC     [x]  VASQUEZ Stockings     [x]  Sequential Compression Device     []  None     7. Orders:   > S/P Right axillary to bifemoral artery bypass using an 8 mm Propaten graft from the right axilla to the right common femoral artery with a jump femoral-femoral bypass with another 8 mm Propaten external ring bypass; Right common femoral artery, and profunda femoris artery and superficial femoral artery endarterectomy causing an extensive surgery on the right side (4/4/2017 - Dr. Anu Mosley); S/P Cutdown of femoral-femoral bypass, more on the left groin side; Right lower extremity angiography with first-order catheterization (4/7/2017 - Dr. Anu Mosley); S/P Right femoral to above-knee popliteal bypass with an 8 mm PTFE graft (4/10/2017 - Dr. Licha Benites);  History of critical ischemia of the right lower limb secondary to peripheral arterial disease   > Continue:    > Aspirin 81 mg PO once daily with breakfast    > Atorvastatin 40 mg PO q HS    > Clopidogrel 75 mg PO once daily with dinner      > Acute Postoperative Blood Loss Anemia   > Hgb/Hct (3/15/2017, prior to admission to [address] [place] [address]) = 14.3/40.8   > Hgb/Hct (4/4/2017, post-op) = 9.8/27.1    > Hgb/Hct (4/19/2017) = 8.2/23.7   > Hgb/Hct (4/23/2017, on admission) = 8.0/23.3   > Anemia work-up showed serum iron 21, TIBC 196, iron % saturation 11, ferritin 779, reticulocyte count 3.7   > Patient was started on FeSO4 and Ascorbic acid   > Hgb/Hct (4/24/2017) = 8.0/23.8   > On 4/24/2017, patient was given Epoetin constance 10,000 units SC x 1 dose      > Continue:    > FeSO4 325 mg PO once daily with breakfast     > Ascorbic Acid 250 mg PO once daily with breakfast (to enhance the absorption of the FeSO4)       > Benign hypertensive heart disease with chronic systolic heart failure   > On 4/25/2017:    > Changed Diltiazem  mg PO once daily (6AM) to Diltiazem 30 mg PO TID AC and qHS (7:30AM, 11:30AM, 4:30PM, 10PM)    > Decreased Metoprolol tartrate from 25 mg to 12.5 mg PO q 12 hr (9AM, 9PM)   > Continue:    > Diltiazem 30 mg PO TID AC and qHS (7:30AM, 11:30AM, 4:30PM, 10PM)    > Metoprolol tartrate 12.5 mg PO q 12 hr (9AM, 9PM)    > Losartan 25 mg PO once daily (change from 12PM to 9AM)      > Chronic atrial fibrillation, S/P Synchronized external cardioversion (4/18/2017 - Dr. Margot Crowe), anticoagulated on Coumadin   > On 4/25/2017:    > Changed Diltiazem  mg PO once daily (6AM) to Diltiazem 30 mg PO TID AC and qHS (7:30AM, 11:30AM, 4:30PM, 10PM)    > Decreased Metoprolol tartrate from 25 mg to 12.5 mg PO q 12 hr (9AM, 9PM)   > Continue:    > Amiodarone 400 mg PO BID to complete 14 days (until 4/21/2017), then decrease to 200 mg PO BID afterwards    > Diltiazem 30 mg PO TID AC and qHS (7:30AM, 11:30AM, 4:30PM, 10PM)    > Metoprolol tartrate 12.5 mg PO q 12 hr (9AM, 9PM)   > Target INR = 2.5 to 3.5   > Patient received Coumadin 3 mg PO last night   > INR 2.2   > Coumadin 5 mg PO tonight      > Chronic ulcer of right foot   > Heel suspension boot on both feet when supine in bed   > Wound care recommendations as per Podiatry: Betadine dressings, non compressive, with 4x4s, conform once daily   > Patient was started on Ascorbic acid and Zinc sulfate   > Continue:    > Ascorbic acid 250 mg PO once daily    > Zinc sulfate 220 mg PO once daily      > Sick euthyroid syndrome   > TSH (4/6/2017) = 4.77   > TSH (4/22/2017) = 5.08   > Free T4 (4/22/2017) = 1.2    > On 4/22/2017, patient was started on Levothyroxine 100 mcg PO once daily x 2 doses   > On 4/24/2017, patient was given Levothyroxine 50 mcg PO once daily x 2 doses   > Continue Levothyroxine 25 mcg PO once daily x 2 doses then discontinue      > History of hyperuricemia   > Uric acid (7/6/2016) = 9.8   > Uric acid (4/21/2017) = 2.9      > Vitamin D Deficiency   > Vitamin D 25-Hydroxy (4/22/2017) = 12.0   > Patient was given Cholecalciferol 50,000 units PO x 1 dose    > On 4/23/2017, patient was started on Cholecalciferol 5,000 units PO once daily   > Continue Cholecalciferol 5,000 units PO once daily      > Analgesia   > Continue:    > Acetaminophen 650 mg PO q 4 hr PRN for pain level less than 5/10    > Duloxetine 60 mg PO once daily    > Gabapentin 100 mg PO BID    > Percocet 10/325 1 tab PO q 4 hr PRN for pain level greater than 4/10       8. Patient's progress in rehabilitation and medical issues discussed with the patient. All questions answered to the best of my ability. Care plan discussed with patient and nurse.       Signed:    Harshil Wright MD    April 26, 2017

## 2017-04-26 NOTE — PROGRESS NOTES
Problem: Self Care Deficits Care Plan (Adult)  Goal: *Acute Goals and Plan of Care (Insert Text)  Long Term Goals (to be met upon discharge date) in order to increase pts functional independence and safety, and decrease burden of care:  1. Pt will perform grooming with independence while standing. 2. Pt will perform UB bathing with modified independence. 3. Pt will perform LB bathing with modified independence. 4. Pt will perform tub/shower transfer with modified independence. 5. Pt will perform UB dressing with independence. 6. Pt will perform LB dressing with modified independence. 7. Pt will perform toileting task with modified independence. 8. Pt will perform toilet transfer with modified independence. Short Term Weekly Goals for (2017 to 2017) in order to increase pts functional independence and safety, and decrease burden of care:  1. Pt will perform grooming with independence while standing. 2. Pt will perform UB bathing with modified independence. 3. Pt will perform LB bathing with modified independence. 4. Pt will perform tub/shower transfer with supervision. 5. Pt will perform UB dressing with independence. 6. Pt will perform LB dressing with supervision. 7. Pt will perform toileting task with supervision. 8. Pt will perform toilet transfer with supervision. OCCUPATIONAL THERAPY DAILY NOTE  Patient Name:Mj Caldera  Time Spent With Patient  Time In: 0800  Time Out: 0900     Time In: 1100  Time Out: 36     Medical Diagnosis:  Debility   Status post femoral-popliteal bypass surgery Status post femoral-popliteal bypass surgery      Pain at start of tx: Pt reports increased pain and discomfort on the top of his R foot today. Pain at stop of tx: Pt reports increased pain and discomfort on the top of his R foot today. Patient identified with name and : yes  Subjective: Pt very pleasant and cooperative throughout treatment session.  Pt receptive to all education provided within the treatment session. Objective:      THERAPEUTIC ACTIVITY Daily Assessment     Pt participated in standing tolerance activity while playing tabletop game. He stood for 1 trial of 14:30 minutes with supervision while demonstrating unilateral reaching to play the game. Verbal cues provided to promote equal BLE weightbearing as pt tends to shift to his L.       THERAPEUTIC EXERCISE Daily Assessment     Pt participated in seated UE therex for UE and core strengthening and to improve overall endurance. He performed x10 minutes on the arm bike with moderate resistance without rest break. GROOMING Daily Assessment     Grooming  Grooming Assistance : 7 (Independent)  Comments: Pt independently performed oral care and brushed his hair while standing at the sink with supervision for safety with standing only. UPPER BODY BATHING Daily Assessment     Upper Body Bathing  Bathing Assistance, Upper: 6 (Modified independent)  Position Performed: Seated in chair       LOWER BODY BATHING Daily Assessment     Lower Body Bathing  Bathing Assistance, Lower : 5 (Supervision)  Position Performed: Seated in chair;Standing  Comments: Pt requires supervision for safety only while standing to thoroughly wash and rinse buttocks. UPPER BODY DRESSING Daily Assessment     Upper Body Dressing   Dressing Assistance : 7 (Independent)       LOWER BODY DRESSING Daily Assessment     Lower Body Dressing   Dressing Assistance : 5 (Supervision)  Leg Crossed Method Used: Yes  Position Performed: Seated in chair;Standing  Comments: Pt requires supervision for safety while standing for managing clothing over his buttocks. He utilizes crossed leg technique for doffing and donning socks.        MOBILITY/TRANSFERS Daily Assessment     Functional Transfers  Tub or Shower Type: Shower  Amount of Assistance Required: 5 (Supervision/setup)  Adaptive Equipment: Tub transfer bench;Walker (comment)      Pt performed mobility in room utilizing RW with walker basket in order to gather personal belongings to setup for shower with supervision. He performed stand step transfer into walk-in shower utilizing RW with supervision and verbal cues for proper hand placement. Pt also worked on sit <> stand transfers with B hands on B knees from w/c to carryover to toilet transfers due to pt's dislike for use of BSC over toilet. He performed 2 sets x 10 reps each sit <> stand transfers, initially requiring CGA and able to progress to SBA/supervision levels. Assessment: Pt is progressing well with improved standing tolerance and balance during ADLs and functional tasks. Plan of Care: Continue POC to maximize pt independence and safety performing ADLs and functional transfers/mobility.      Kalie Cruz, OTR  4/26/2017

## 2017-04-26 NOTE — PROGRESS NOTES
SHIFT CHANGE NOTE FOR [facility]    Bedside and Verbal shift change report given to  Claudean Hull, RN oncoming nurse) by Shyanne Caro RN   (offgoing nurse). Report included the following information SBAR, Kardex, MAR and Recent Results.     Situation:   Code Status: Full Code   Reason for Admission: [address] Day: 5   Problem List:   Hospital Problems  Date Reviewed: 4/26/2017          Codes Class Noted POA    Vitamin D deficiency (Chronic) ICD-10-CM: E55.9  ICD-9-CM: 268.9  4/22/2017 Yes    Overview Signed 4/22/2017 12:56 PM by Dina Centeno MD     Vitamin D 25-Hydroxy (4/22/2017) = 12.0             Anticoagulated on Coumadin (Chronic) ICD-10-CM: Z51.81, Z79.01  ICD-9-CM: V58.83, V58.61  Unknown Yes        Hyperuricemia (Chronic) ICD-10-CM: E79.0  ICD-9-CM: 790.6  Unknown Yes        Aftercare following surgery of the circulatory system ICD-10-CM: Q47.423  ICD-9-CM: V58.73  4/21/2017 Yes    Overview Signed 4/21/2017  2:32 PM by Dina Centeno MD     S/P Right axillary to bifemoral artery bypass using an 8 mm Propaten graft from the right axilla to the right common femoral artery with a jump femoral-femoral bypass with another 8 mm Propaten external ring bypass; Right common femoral artery, and profunda femoris artery and superficial femoral artery endarterectomy causing an extensive surgery on the right side (4/4/2017 - Dr. Estelle Brannon)    S/P Cutdown of femoral-femoral bypass, more on the left groin side; Right lower extremity angiography with first-order catheterization (4/7/2017 - Dr. Estelle Brannon)    S/P Right femoral to above-knee popliteal bypass with an 8 mm PTFE graft (4/10/2017 - Dr. Dhaval Pierre)             * (Principal)Status post femoral-popliteal bypass surgery ICD-10-CM: Z95.828  ICD-9-CM: V45.89  4/21/2017 Yes    Overview Signed 4/21/2017  2:33 PM by Dina Centeno MD     S/P Right axillary to bifemoral artery bypass using an 8 mm Propaten graft from the right axilla to the right common femoral artery with a jump femoral-femoral bypass with another 8 mm Propaten external ring bypass; Right common femoral artery, and profunda femoris artery and superficial femoral artery endarterectomy causing an extensive surgery on the right side (4/4/2017 - Dr. Helder Martin)    S/P Cutdown of femoral-femoral bypass, more on the left groin side; Right lower extremity angiography with first-order catheterization (4/7/2017 - Dr. Helder Martin)    S/P Right femoral to above-knee popliteal bypass with an 8 mm PTFE graft (4/10/2017 - Dr. Sidney Strauss)             History of cardioversion ICD-10-CM: Z98.890  ICD-9-CM: V15.1  4/18/2017 Yes    Overview Signed 4/21/2017  2:34 PM by Bobo Garcia MD     S/P Synchronized external cardioversion (4/18/2017 - Dr. Coy Ramirez)             Chronic atrial fibrillation ([facility] (Chronic) ICD-10-CM: R94.7  ICD-9-CM: 427.31  Unknown Yes        Critical ischemia of lower extremity ICD-10-CM: I99.8  ICD-9-CM: 459.9  4/10/2017 No    Overview Signed 4/21/2017  4:41 PM by Bobo Garcia MD     Right lower limb             Euthyroid sick syndrome ICD-10-CM: E07.81  ICD-9-CM: 790.94  4/6/2017 Yes        Acute blood loss as cause of postoperative anemia ICD-10-CM: D62  ICD-9-CM: 285.1  4/4/2017 Yes        Impaired mobility and ADLs ICD-10-CM: Z74.09  ICD-9-CM: 799.89  4/4/2017 Yes        Chronic ulcer of right foot (HCC) (Chronic) ICD-10-CM: L97.519  ICD-9-CM: 707.15  4/4/2017 Yes        Aortoiliac occlusive disease ([facility] (Chronic) ICD-10-CM: J96.13  ICD-9-CM: 444.09  1/25/2017 Yes        Atherosclerosis of native artery of both lower extremities with intermittent claudication (HCC) (Chronic) ICD-10-CM: L23.256  ICD-9-CM: 440.21  1/25/2017 Yes        Peripheral artery disease (HCC) (Chronic) ICD-10-CM: I73.9  ICD-9-CM: 443.9  1/25/2017 Yes        Benign hypertensive heart disease with systolic congestive heart failure, NYHA class 2 (HCC) (Chronic) ICD-10-CM: I11.0, I50.20  ICD-9-CM: 402.11, 428.20, 428.0  1/26/2015 Yes              Background:   Past Medical History:   Past Medical History:   Diagnosis Date    Acute blood loss as cause of postoperative anemia 4/4/2017    Anticoagulated on Coumadin     [facility] occlusive [facility] 1/25/2017    Atherosclerosis of native artery of both lower extremities with intermittent claudication ([facility] 1/25/2017    Benign hypertensive heart disease with systolic congestive heart failure, NYHA class 2 ([facility] 1/26/2015    Carotid artery disease (HCC)     Chronic atrial fibrillation (HCC)     Chronic systolic heart failure (HCC)     Chronic ulcer of right foot ([facility] 4/4/2017    Coronary artery disease involving native coronary artery of native heart 3/15/2017    Successful stenting of Cx (Xience LESLEY) and RCA (Xience LESLEY) to 0% by Dr. Christian Rivas on 3/15/17.     DDD (degenerative disc disease), lumbar 1/26/2015    Dyslipidemia     Erectile dysfunction 7/5/2016    Euthyroid sick syndrome 4/6/2017    Hereditary peripheral neuropathy 11/15/2016    History of cardioversion 4/18/2017    S/P Synchronized external cardioversion (4/18/2017 - Dr. Cary Bass)    Hyperuricemia     Ischemic cardiomyopathy     Peripheral artery disease ([facility] 1/25/2017    Spinal stenosis of lumbar region with radiculopathy 5/4/2015    Dr. Virgen Young Vitamin D deficiency 4/22/2017    Vitamin D 25-Hydroxy (4/22/2017) = 12.0      Patient taking anticoagulants YES   Patient has a defibrillator: no     Assessment:   Changes in Assessment throughout shift: NONE                     Last Vitals:     Vitals:    04/26/17 0632 04/26/17 0737 04/26/17 1141 04/26/17 1625   BP: 112/74 118/70 111/62 110/64   Pulse: 78 77  68   Resp:  18  18   Temp:  96.6 °F (35.9 °C)  97.6 °F (36.4 °C)   SpO2:  100%  94%        PAIN    Pain Assessment    Pain Intensity 1: 0 (04/26/17 1625) Pain Intensity 1: 2 (12/29/14 1105)    Pain Location 1: Foot Pain Location 1: Abdomen    Pain Intervention(s) 1: Medication (see MAR) Pain Intervention(s) 1: Medication (see MAR)  Patient Stated Pain Goal: 0 Patient Stated Pain Goal: 0  o Intervention effective: YES   o Other actions taken for pain: MEDICATION     Skin Assessment  Skin color Skin Color: Appropriate for ethnicity  Condition/Temperature Skin Condition/Temp: Warm  Integrity Skin Integrity: Incision (comment), Wound (add Wound LDA)  Turgor Turgor: Non-tenting  Weekly Pressure Ulcer Documentation  Pressure  Injury Documentation: No Pressure Injury Noted-Pressure Ulcer Prevention Initiated  Wound Prevention & Protection Methods  Orientation of wound Orientation of Wound Prevention: Posterior  Location of Prevention Location of Wound Prevention: Sacrum/Coccyx  Dressing Present Dressing Present : No  Dressing Status Dressing Status: Intact  Wound Offloading Wound Offloading (Prevention Methods): Bed, pressure redistribution/air     INTAKE/OUPUT    Date 04/25/17 1900 - 04/26/17 0659 04/26/17 0700 - 04/27/17 0659   Shift 0327-0708 24 Hour Total 8910-5308 0543-0505 24 Hour Total   I  N  T  A  K  E   P. O.  720 660  660      P. O.  720 660  660    Shift Total  720 660  660   O  U  T  P  U  T   Urine 2600 3100 300  300      Urine Voided 2600 3100 300  300      Urine Occurrence(s) 5 x 10 x 7 x  7 x    Stool           Stool Occurrence(s) 0 x 1 x 0 x  0 x    Shift Total 2600 3100 300  300   NET -2600 -2380 360  360   Weight (kg)            Recommendations:  1. Patient needs and requests: TOILETING    2. Diet: CARDIAC REG    3. Pending tests/procedures: LABS    4. Functional Level/Equipment: wheelchair    5. Estimated Discharge Date: TBD Posted on Whiteboard in Patients Room: no     HEALS Safety Check    A safety check occurred in the patient's room between off going nurse and oncoming nurse listed above.     The safety check included the below items  Area Items   H  High Alert Medications - Verify all high alert medication drips (heparin, PCA, etc.)   E  Equipment - Suction is set up for ALL patients (with cailin)  - Red plugs utilized for all equipment (IV pumps, etc.)  - WOWs wiped down at end of shift.  - Room stocked with oxygen, suction, and other unit-specific supplies   A  Alarms - Bed alarm is set for fall risk patients  - Ensure chair alarm is in place and activated if patient is up in a chair   L  Lines - Check IV for any infiltration  - Jiang bag is empty if patient has a Jiang   - Tubing and IV bags are labeled   S  Safety   - Room is clean, patient is clean, and equipment is clean. - Hallways are clear from equipment besides carts. - Fall bracelet on for fall risk patients  - Ensure room is clear and free of clutter  - Suction is set up for ALL patients (with cailin)  - Hallways are clear from equipment besides carts.    - Isolation precautions followed, supplies available outside room, sign posted

## 2017-04-27 LAB
INR PPP: 2.4 (ref 0.8–1.2)
PROTHROMBIN TIME: 25.2 SEC (ref 11.5–15.2)

## 2017-04-27 PROCEDURE — 65310000000 HC RM PRIVATE REHAB

## 2017-04-27 PROCEDURE — 97535 SELF CARE MNGMENT TRAINING: CPT

## 2017-04-27 PROCEDURE — 74011250637 HC RX REV CODE- 250/637: Performed by: INTERNAL MEDICINE

## 2017-04-27 PROCEDURE — 97150 GROUP THERAPEUTIC PROCEDURES: CPT

## 2017-04-27 PROCEDURE — 74011250637 HC RX REV CODE- 250/637: Performed by: EMERGENCY MEDICINE

## 2017-04-27 PROCEDURE — 85610 PROTHROMBIN TIME: CPT | Performed by: INTERNAL MEDICINE

## 2017-04-27 PROCEDURE — 97110 THERAPEUTIC EXERCISES: CPT

## 2017-04-27 PROCEDURE — 36415 COLL VENOUS BLD VENIPUNCTURE: CPT | Performed by: INTERNAL MEDICINE

## 2017-04-27 PROCEDURE — 97530 THERAPEUTIC ACTIVITIES: CPT

## 2017-04-27 PROCEDURE — 97116 GAIT TRAINING THERAPY: CPT

## 2017-04-27 RX ORDER — WARFARIN SODIUM 5 MG/1
2.5 TABLET ORAL
Status: COMPLETED | OUTPATIENT
Start: 2017-04-27 | End: 2017-04-27

## 2017-04-27 RX ADMIN — DOCUSATE SODIUM 100 MG: 100 CAPSULE, LIQUID FILLED ORAL at 18:00

## 2017-04-27 RX ADMIN — OXYCODONE HYDROCHLORIDE AND ACETAMINOPHEN 1 TABLET: 10; 325 TABLET ORAL at 11:37

## 2017-04-27 RX ADMIN — LACTOBACILLUS TAB 2 TABLET: TAB at 17:56

## 2017-04-27 RX ADMIN — DOCUSATE SODIUM 100 MG: 100 CAPSULE, LIQUID FILLED ORAL at 10:10

## 2017-04-27 RX ADMIN — DILTIAZEM HYDROCHLORIDE 30 MG: 30 TABLET, FILM COATED ORAL at 16:27

## 2017-04-27 RX ADMIN — OXYCODONE HYDROCHLORIDE AND ACETAMINOPHEN 1 TABLET: 10; 325 TABLET ORAL at 00:01

## 2017-04-27 RX ADMIN — ATORVASTATIN CALCIUM 40 MG: 40 TABLET, FILM COATED ORAL at 21:00

## 2017-04-27 RX ADMIN — DILTIAZEM HYDROCHLORIDE 30 MG: 30 TABLET, FILM COATED ORAL at 22:00

## 2017-04-27 RX ADMIN — GABAPENTIN 100 MG: 100 CAPSULE ORAL at 10:02

## 2017-04-27 RX ADMIN — GABAPENTIN 100 MG: 100 CAPSULE ORAL at 17:57

## 2017-04-27 RX ADMIN — LACTOBACILLUS TAB 2 TABLET: TAB at 10:00

## 2017-04-27 RX ADMIN — DULOXETINE HYDROCHLORIDE 60 MG: 60 CAPSULE, DELAYED RELEASE ORAL at 10:02

## 2017-04-27 RX ADMIN — OXYCODONE HYDROCHLORIDE AND ACETAMINOPHEN 1 TABLET: 10; 325 TABLET ORAL at 22:10

## 2017-04-27 RX ADMIN — ASPIRIN 81 MG CHEWABLE TABLET 81 MG: 81 TABLET CHEWABLE at 10:01

## 2017-04-27 RX ADMIN — OXYCODONE HYDROCHLORIDE AND ACETAMINOPHEN 1 TABLET: 10; 325 TABLET ORAL at 17:55

## 2017-04-27 RX ADMIN — DILTIAZEM HYDROCHLORIDE 30 MG: 30 TABLET, FILM COATED ORAL at 10:10

## 2017-04-27 RX ADMIN — LEVOTHYROXINE SODIUM 25 MCG: 25 TABLET ORAL at 05:30

## 2017-04-27 RX ADMIN — Medication 5000 UNITS: at 10:00

## 2017-04-27 RX ADMIN — AMIODARONE HYDROCHLORIDE 400 MG: 200 TABLET ORAL at 10:01

## 2017-04-27 RX ADMIN — METOPROLOL TARTRATE 12.5 MG: 25 TABLET ORAL at 21:00

## 2017-04-27 RX ADMIN — WARFARIN SODIUM 2.5 MG: 5 TABLET ORAL at 17:57

## 2017-04-27 RX ADMIN — CLOPIDOGREL BISULFATE 75 MG: 75 TABLET, FILM COATED ORAL at 17:56

## 2017-04-27 RX ADMIN — DILTIAZEM HYDROCHLORIDE 30 MG: 30 TABLET, FILM COATED ORAL at 05:31

## 2017-04-27 RX ADMIN — Medication 250 MG: at 10:00

## 2017-04-27 RX ADMIN — ZINC SULFATE 220 MG (50 MG) CAPSULE 220 MG: CAPSULE at 10:00

## 2017-04-27 RX ADMIN — AMIODARONE HYDROCHLORIDE 400 MG: 200 TABLET ORAL at 17:56

## 2017-04-27 RX ADMIN — FERROUS SULFATE TAB 325 MG (65 MG ELEMENTAL FE) 325 MG: 325 (65 FE) TAB at 10:00

## 2017-04-27 NOTE — PROGRESS NOTES
Problem: Self Care Deficits Care Plan (Adult)  Goal: *Acute Goals and Plan of Care (Insert Text)  Long Term Goals (to be met upon discharge date) in order to increase pts functional independence and safety, and decrease burden of care:  1. Pt will perform grooming with independence while standing. 2. Pt will perform UB bathing with modified independence. 3. Pt will perform LB bathing with modified independence. 4. Pt will perform tub/shower transfer with modified independence. 5. Pt will perform UB dressing with independence. 6. Pt will perform LB dressing with modified independence. 7. Pt will perform toileting task with modified independence. 8. Pt will perform toilet transfer with modified independence. Short Term Weekly Goals for (4/22/2017 to 4/29/2017) in order to increase pts functional independence and safety, and decrease burden of care:  1. Pt will perform grooming with independence while standing. 2. Pt will perform UB bathing with modified independence. 3. Pt will perform LB bathing with modified independence. 4. Pt will perform tub/shower transfer with supervision. 5. Pt will perform UB dressing with independence. 6. Pt will perform LB dressing with supervision. 7. Pt will perform toileting task with supervision. 8. Pt will perform toilet transfer with supervision. OCCUPATIONAL THERAPY DAILY NOTE  Patient Name:Mj Ford        Medical Diagnosis:  Debility   Status post femoral-popliteal bypass surgery Status post femoral-popliteal bypass surgery      Pain at start of tx:3 R LE  Pain at stop of tx:6 R LE        Subjective: \" My leg just feels tight. \"     Objective:      THERAPEUTIC ACTIVITY Daily Assessment      Pt stood to play [employer] ball outside on side walk with S and rest breaks as needed. THERAPEUTIC EXERCISE Daily Assessment     B UE ex seated edge of w/c with cues for positioning using 5 lb dowel 15 reps with rest breaks all planes shoulder/ elbow.          IADL Daily Assessment     Pt maneuvered IADL obstacle course obtaining washcloths with reacher from floor and placing them in walker basket with S. Cues to attach reacher to RW when not in use. GROOMING Daily Assessment     Grooming  Grooming Assistance : 7 (Independent)  Comments: Oral care w/c level       TOILETING Daily Assessment      Toilet transfer only no toileting needed. MOBILITY/TRANSFERS Daily Assessment      Toilet transfer S with RW for standing balance. Noted good safety awareness. Assessment: Pt required occasional cues for LE placement such as BILLIE during functional mobility.       Plan of Care: Continue with [address], [address]  4/27/2017

## 2017-04-27 NOTE — PROGRESS NOTES
Problem: Mobility Impaired (Adult and Pediatric)  Goal: *Acute Goals and Plan of Care (Insert Text)  Physical Therapy Goals  Short Term Goals  Initiated 4/24/2017 and to be accomplished within 7 day(s) 5/1/17  1. Patient will move from supine to sit and sit to supine in bed with modified independence. 2. Patient will transfer from bed to chair and chair to bed with supervision/set-up using the least restrictive device. 3. Patient will perform sit to stand with supervision/set-up. 4. Patient will ambulate with supervision/set-up for 100 feet with the least restrictive device. 5. Patient will ascend/descend 4 stairs with one handrail(s) with supervision/set-up. Long Term Goals  Initiated 4/24/2017 and to be accomplished within 14 day(s) 5/8/17  1. Patient will move from supine to sit and sit to supine in bed with independence. 2. Patient will transfer from bed to chair and chair to bed with modified independence using the least restrictive device. 3. Patient will perform sit to stand with modified independence. 4. Patient will ambulate with modified independence for 150 feet with the least restrictive device. 5. Patient will ascend/descend 4 stairs with one handrail(s) with modified independence. PHYSICAL THERAPY DAILY NOTE  Patient Name:Mj Espinoza August  Time In: 1100  Time Out: 1156  Patient Seen For: Balance activities;Gait training; Therapeutic exercise;Transfer training; Wheelchair mobility; Patient education  Diagnosis: Debility   Status post femoral-popliteal bypass surgery Status post femoral-popliteal bypass surgery  Precautions: Fall risk      Subjective: Patient reports he is doing well today; complains of tingling in right heel as well as tightness in right calf. Pain at start of tx:0/10            Pain at stop of tx:7/10 in right heel; reports pain medication was given prior to treatment.       Time In: 1330  Time Out: 1430  Patient Seen For: gait training, therapeutic exercise  Pain: 5/10 - reports pain medication given prior to treatment. Patient identified with name and :Yes         Objective: FIM      GROSS ASSESSMENT Daily Assessment     AROM: Generally decreased, functional  PROM: Generally decreased, functional  Strength: Generally decreased, functional  Coordination: Within functional limits  Tone: Normal  Sensation: Intact          BED/MAT MOBILITY Daily Assessment     Rolling Right : 6 (Modified independent)  Rolling Left : 6 (Modified independent)  Supine to Sit : 6 (Modified independent)  Sit to Supine : 6 (Modified independent)     Decreased velocity; performed on mat table and on bed. TRANSFERS Daily Assessment     Sit to Stand Assistance: Contact guard assistance for safety progressed to supervision - minimal verbal and manual cues for decreased speed for safety, as well as increasing right LE weight bearing to promote equal weight bearing. Stand to sit: CGA progressed to supervision - minimal cues for UE placement on bilateral thighs to promote increased LE muscle activation. Moderate verbal cues for forward gaze to decreased thoracic and lumbar flexion. Transfer Type: Other  Other: stand step with rolling walker  Transfer Assistance : 4 (Contact guard assistance)see sit<->Stand for details; progressed to close supervision for safety and moderate verbal cues for decreased speed of left step and increased left step length to promote increased right side weight bearing and increased right ankle dorsiflexion. GAIT Daily Assessment     Amount of Assistance: 5 (Stand-by assistance) for safety; same cueing and guarding for indoor and outdoor surfaces. Moderate verbal cues for increased left step length and decreased speed of left step to promote increased right LE weight bearing and increased right ankle dorsiflexion. Minimal verbal cues for center alignment of rolling walker to correct shift to left.    Distance (ft): 150 Feet (ft) indoor level and outdoor surfaces (pavement, wooden planks, stone pavement, and 1/2 inch thresholds)  Assistive Device: Walker, rolling          BALANCE Daily Assessment     Sitting - Static: Good (unsupported) no sway sitting edge of mat table; supervision   Sitting - Dynamic: Good (unsupported)no sway with LE exercises, supervision   Standing - Static: Good; no sway with support; supervision   Standing - Dynamic : Impaired - not challenged; requires upper extremity support. WHEELCHAIR MOBILITY Daily Assessment     Able to Propel (ft): 300 feet  Functional Level: 6  Curbs/Ramps Assist Required (FIM Score): 1 (Total assistance) - NT  Wheelchair Setup Assist Required : 6 (Modified independent)  Wheelchair Management: Manages left brake;Manages right brake; propels with bilateral UE's          LOWER EXTREMITY EXERCISES Daily Assessment     Extremity: Both  Exercise Type #1: Supine lower extremity strengthening - straight leg raises  Sets Performed: 3  Reps Performed: 10  Level of Assist: Supervision  Exercise Type #2: Supine lower extremity strengthening - Bridges  Sets Performed: 3  Reps Performed: 10  Level of Assist: Supervision; reports increased pain in Right heel.   Exercise Type #3: Seated lower extremity strengthening - Hip flexion   Sets Performed: 3  Reps Performed: 10  Level of Assist: Supervision; 3lb ankle weight left foot  Exercise Type #3: Seated lower extremity strengthening - Knee extension   Sets Performed: 3  Reps Performed: 10  Level of Assist: Supervision; 3lb ankle weight left foot  Exercise Type #3: Sidelying lower extremity strengthening - Hip abduction   Sets Performed: 3  Reps Performed: 10  Level of Assist: Supervision     Calf stretch in standing bilaterally  2 sets of 15 seconds with toe on 4 inch block and heel on ground  1 set of 10 repetitions of single leg stance with contralateral toe tap from 4 inch step (attempted taller but patient was unable to complete) with max verbal cues to keep stance limb heel on block to promote increased dorsiflexion stretch. - verbal cues for decreased speed of movement and not rotating lower extremities to compensate. Patient has grossly 5 degrees dorsiflexion bilaterally. Assessment: Patient demonstrates progression as evident by requiring supervision guard rather than contact guard on indoor and outdoor surfaces. Patient continues to present with decreased bilateral dorsiflexion, decreased right lower extremity strength, and increased pain resulting in decreased quality of gait and decreased independence with tranfers, ambulation, and step negotiation. Patient will continue to benefit from skilled PT             Plan of Care: Continue to progress lower extremity strengthening in standing and sitting; progress dorsiflexion AROM and stretching as tolerated to promote improved gait pattern.      Indiana Hernandez, SPT  4/27/2017

## 2017-04-27 NOTE — PROGRESS NOTES
[facility] PHYSICAL REHABILITATION  [address] [place] [address], [address]     INPATIENT REHABILITATION  DAILY PROGRESS NOTE     Date: 4/27/2017    Name: Carlos Marmolejo Age / Sex: 59 y.o. / male   CSN: [id number] MRN: [id number]   [facility] Date: 4/21/2017 Length of Stay: 6 days     Primary Rehab Diagnosis: Impaired Mobility and ADLs secondary to:  1. S/P Right axillary to bifemoral artery bypass using an 8 mm Propaten graft from the right axilla to the right common femoral artery with a jump femoral-femoral bypass with another 8 mm Propaten external ring bypass; Right common femoral artery, and profunda femoris artery and superficial femoral artery endarterectomy causing an extensive surgery on the right side (4/4/2017 - Dr. Willow Wong); S/P Cutdown of femoral-femoral bypass, more on the left groin side; Right lower extremity angiography with first-order catheterization (4/7/2017 - Dr. Willow Wong); S/P Right femoral to above-knee popliteal bypass with an 8 mm PTFE graft (4/10/2017 - Dr. Santana Gerard)  2.  History of critical ischemia of the right lower limb secondary to peripheral arterial disease      Subjective:     Patient in NAD, sitting in achair      Objective:     Vital Signs:  Patient Vitals for the past 24 hrs:   BP Temp Pulse Resp SpO2   04/27/17 1632 108/68 97.9 °F (36.6 °C) 79 20 100 %   04/27/17 0747 118/68 97.4 °F (36.3 °C) 71 16 100 %   04/27/17 0530 120/70 - 82 - -   04/26/17 1930 121/71 97.6 °F (36.4 °C) 76 18 100 %        General:  Awake, alert  Cardiovascular:  S1S2+, RRR  Pulmonary:  CTA b/l  GI:  Soft, BS+, NT, ND  Extremities:  Right foot ulcer with dressing        Current Medications:  Current Facility-Administered Medications   Medication Dose Route Frequency    losartan (COZAAR) tablet 25 mg  25 mg Oral DAILY    metoprolol tartrate (LOPRESSOR) tablet 12.5 mg  12.5 mg Oral Q12H    dilTIAZem (CARDIZEM) IR tablet 30 mg  30 mg Oral AC&HS    pneumococcal 23-valent (PNEUMOVAX 23) injection 0.5 mL  0.5 mL IntraMUSCular PRIOR TO DISCHARGE    cholecalciferol (VITAMIN D3) capsule 5,000 Units  5,000 Units Oral DAILY    ferrous sulfate tablet 325 mg  1 Tab Oral DAILY WITH BREAKFAST    ascorbic acid (vitamin C) (VITAMIN C) tablet 250 mg  250 mg Oral DAILY WITH BREAKFAST    acetaminophen (TYLENOL) tablet 650 mg  650 mg Oral Q4H PRN    docusate sodium (COLACE) capsule 100 mg  100 mg Oral BID    bisacodyl (DULCOLAX) tablet 10 mg  10 mg Oral Q48H PRN    amiodarone (CORDARONE) tablet 400 mg  400 mg Oral BID    oxyCODONE-acetaminophen (PERCOCET 10)  mg per tablet 1 Tab  1 Tab Oral Q4H PRN    clopidogrel (PLAVIX) tablet 75 mg  75 mg Oral DAILY WITH DINNER    aspirin chewable tablet 81 mg  81 mg Oral DAILY WITH BREAKFAST    DULoxetine (CYMBALTA) capsule 60 mg  60 mg Oral DAILY    Lactobacillus Acidoph & Bulgar (FLORANEX) tablet 2 Tab  2 Tab Oral BID    [START ON 5/3/2017] amiodarone (CORDARONE) tablet 200 mg  200 mg Oral BID    WARFARIN INFORMATION NOTE (COUMADIN)   Other Q24H    gabapentin (NEURONTIN) capsule 100 mg  100 mg Oral BID    zinc sulfate (ZINCATE) capsule 220 mg  1 Cap Oral DAILY    atorvastatin (LIPITOR) tablet 40 mg  40 mg Oral QHS       Allergies:  No Known Allergies      Functional Progress:    PHYSICAL THERAPY    ON ADMISSION MOST RECENT   Wheelchair Mobility/Management  Able to Propel (ft): 300 feet  Functional Level: 6  Curbs/Ramps Assist Required (FIM Score): 1 (Total assistance)  Wheelchair Setup Assist Required : 6 (Modified independent)  Wheelchair Management: Manages left brake, Manages right brake Wheelchair Mobility/Management  Able to Propel (ft): 300 feet  Functional Level: 6  Curbs/Ramps Assist Required (FIM Score): 1 (Total assistance)  Wheelchair Setup Assist Required : 6 (Modified independent)  Wheelchair Management: Manages left brake, Manages right brake     Gait  Amount of Assistance: 4 (Contact guard assistance)  Distance (ft): 25 Feet (ft)  Assistive Device: Walker, rolling Gait  Amount of Assistance: 5 (Stand-by assistance)  Distance (ft): 150 Feet (ft)  Assistive Device: Walker, rolling     Balance-Sitting/Standing  Sitting - Static: Good (unsupported)  Sitting - Dynamic: Good (unsupported)  Standing - Static: Good  Standing - Dynamic : Impaired Balance-Sitting/Standing  Sitting - Static: Good (unsupported)  Sitting - Dynamic: Good (unsupported)  Standing - Static: Good  Standing - Dynamic : Impaired     Bed/Mat Mobility  Rolling Right : 5 (Supervision)  Rolling Left : 5 (Supervision)  Supine to Sit : 5 (Supervision)  Sit to Supine : 5 (Supervision) Bed/Mat Mobility  Rolling Right : 6 (Modified independent)  Rolling Left : 6 (Modified independent)  Supine to Sit : 6 (Modified independent)  Sit to Supine : 6 (Modified independent)     Transfers  Transfer Type: Other  Other: stand step without AD  Transfer Assistance : 4 (Contact guard assistance)  Sit to Stand Assistance: Contact guard assistance Transfers  Transfer Type: Other  Other: stand step with rolling walker  Transfer Assistance : 4 (Contact guard assistance)  Sit to Stand Assistance: Contact guard assistance     Steps or Stairs  Steps/Stairs Ambulated (#): 4  Level of Assist : 4 (Contact guard assistance)  Rail Use: Both Steps or Stairs  Steps/Stairs Ambulated (#): 4  Level of Assist : 4 (Contact guard assistance)  Rail Use: Both         Lab/Data Review:  Recent Results (from the past 24 hour(s))   PROTHROMBIN TIME + INR    Collection Time: 04/27/17  6:19 AM   Result Value Ref Range    Prothrombin time 25.2 (H) 11.5 - 15.2 sec    INR 2.4 (H) 0.8 - 1.2         Estimated Glomerular Filtration Rate:  CKD-EPI:   On admission, estimated GFR was 107.9 mL/min/1.73m2 based on a Creatinine of 0.63 mg/dl. Most recent estimated GFR was 103.3 mL/min/1.73m2 based on a Creatinine of 0.70 mg/dl.     MDRD:   On admission, estimated GFR was 138.5 mL/min/1.73m2 based on a Creatinine of 0.63 mg/dl.   Most recent estimated GFR was 122.7 mL/min/1.73m2 based on a Creatinine of 0.70 mg/dl. Assessment:     Primary Rehabilitation Diagnosis  1. Impaired Mobility and ADLs  2. S/P Right axillary to bifemoral artery bypass using an 8 mm Propaten graft from the right axilla to the right common femoral artery with a jump femoral-femoral bypass with another 8 mm Propaten external ring bypass; Right common femoral artery, and profunda femoris artery and superficial femoral artery endarterectomy causing an extensive surgery on the right side (4/4/2017 - Dr. Atul Kirkland); S/P Cutdown of femoral-femoral bypass, more on the left groin side; Right lower extremity angiography with first-order catheterization (4/7/2017 - Dr. Atul Kirkland); S/P Right femoral to above-knee popliteal bypass with an 8 mm PTFE graft (4/10/2017 - Dr. Swapna Velasquez)  3.  History of critical ischemia of the right lower limb secondary to peripheral arterial disease      Comorbidities   Benign hypertensive heart disease with systolic congestive heart failure, NYHA class 2     History of vitamin B12 deficiency    DDD (degenerative disc disease), lumbar    Erectile dysfunction    Spinal stenosis of lumbar region with radiculopathy    Hereditary peripheral neuropathy    Aortoiliac occlusive disease     Atherosclerosis of native artery of both lower extremities with intermittent claudication     Peripheral artery disease     Coronary artery disease involving native coronary artery of native heart    Chronic atrial fibrillation     History of transient alteration of awareness, resolved    Acute blood loss as cause of postoperative anemia    Anticoagulated on Coumadin    Ischemic cardiomyopathy    Chronic systolic heart failure     Carotid artery disease     Dyslipidemia    History of hyperuricemia    Euthyroid sick syndrome    Aftercare following surgery of the circulatory system    History of cardioversion    Chronic ulcers of right foot    Vitamin D deficiency       Plan:     1. Justification for continued stay: Good progression towards established rehabilitation goals. 2. Medical Issues being followed closely:    [x]  Fall and safety precautions     [x]  Wound Care     [x]  Bowel and Bladder Function     [x]  Fluid Electrolyte and Nutrition Balance     [x]  Pain Control      3. Issues that 24 hour rehabilitation nursing is following:    [x]  Fall and safety precautions     [x]  Wound Care     [x]  Bowel and Bladder Function     [x]  Fluid Electrolyte and Nutrition Balance     [x]  Pain Control      [x]  Assistance with and education on in-room safety with transfers to and from the bed, wheelchair, toilet and shower. 4. Acute rehabilitation plan of care:    [x]  Continue current care and rehab. [x]  Physical Therapy           [x]  Occupational Therapy           []  Speech Therapy     []  Hold Rehab until further notice     5. Medications:    [x]  MAR Reviewed     [x]  Continue Present Medications     6. DVT Prophylaxis:      []  Lovenox     []  Unfractionated Heparin     [x]  Coumadin     []  NOAC     [x]  VASQUEZ Stockings     [x]  Sequential Compression Device     []  None     7. Orders:   > S/P Right axillary to bifemoral artery bypass using an 8 mm Propaten graft from the right axilla to the right common femoral artery with a jump femoral-femoral bypass with another 8 mm Propaten external ring bypass; Right common femoral artery, and profunda femoris artery and superficial femoral artery endarterectomy causing an extensive surgery on the right side (4/4/2017 - Dr. Tres Jang); S/P Cutdown of femoral-femoral bypass, more on the left groin side; Right lower extremity angiography with first-order catheterization (4/7/2017 - Dr. Tres Jang); S/P Right femoral to above-knee popliteal bypass with an 8 mm PTFE graft (4/10/2017 - Dr. Land);  History of critical ischemia of the right lower limb secondary to peripheral arterial disease   > Continue:    > Aspirin 81 mg PO once daily with breakfast    > Atorvastatin 40 mg PO q HS    > Clopidogrel 75 mg PO once daily with dinner      > Acute Postoperative Blood Loss Anemia   > Hgb/Hct (3/15/2017, prior to admission to [facility]) = 14.3/40.8   > Hgb/Hct (4/4/2017, post-op) = 9.8/27.1    > Hgb/Hct (4/19/2017) = 8.2/23.7   > Hgb/Hct (4/23/2017, on admission) = 8.0/23.3   > Anemia work-up showed serum iron 21, TIBC 196, iron % saturation 11, ferritin 779, reticulocyte count 3.7   > Patient was started on FeSO4 and Ascorbic acid   > Hgb/Hct (4/24/2017) = 8.0/23.8   > On 4/24/2017, patient was given Epoetin constance 10,000 units SC x 1 dose      > Continue:    > FeSO4 325 mg PO once daily with breakfast     > Ascorbic Acid 250 mg PO once daily with breakfast (to enhance the absorption of the FeSO4)       > Benign hypertensive heart disease with chronic systolic heart failure   > On 4/25/2017:    > Changed Diltiazem  mg PO once daily (6AM) to Diltiazem 30 mg PO TID AC and qHS (7:30AM, 11:30AM, 4:30PM, 10PM)    > Decreased Metoprolol tartrate from 25 mg to 12.5 mg PO q 12 hr (9AM, 9PM)   > Continue:    > Diltiazem 30 mg PO TID AC and qHS (7:30AM, 11:30AM, 4:30PM, 10PM)    > Metoprolol tartrate 12.5 mg PO q 12 hr (9AM, 9PM)    > Losartan 25 mg PO once daily (change from 12PM to 9AM)      > Chronic atrial fibrillation, S/P Synchronized external cardioversion (4/18/2017 - Dr. Katya Vo), anticoagulated on Coumadin   > On 4/25/2017:    > Changed Diltiazem  mg PO once daily (6AM) to Diltiazem 30 mg PO TID AC and qHS (7:30AM, 11:30AM, 4:30PM, 10PM)    > Decreased Metoprolol tartrate from 25 mg to 12.5 mg PO q 12 hr (9AM, 9PM)   > Continue:    > Amiodarone 400 mg PO BID to complete 14 days (until 4/21/2017), then decrease to 200 mg PO BID afterwards    > Diltiazem 30 mg PO TID AC and qHS (7:30AM, 11:30AM, 4:30PM, 10PM)    > Metoprolol tartrate 12.5 mg PO q 12 hr (9AM, 9PM)   > Target INR = 2.5 to 3.5   > INR 2.4   > Coumadin 2.5 mg PO tonight      > Chronic ulcer of right foot   > Heel suspension boot on both feet when supine in bed   > Wound care recommendations as per Podiatry: Betadine dressings, non compressive, with 4x4s, conform once daily   > Patient was started on Ascorbic acid and Zinc sulfate   > Continue:    > Ascorbic acid 250 mg PO once daily    > Zinc sulfate 220 mg PO once daily       > Vitamin D Deficiency   supplement      > Analgesia   Percocet, neurontin      D/w Patient      Signed:    Luis Antonio Pugh MD      April 27, 2017

## 2017-04-27 NOTE — PROGRESS NOTES
SHIFT CHANGE NOTE FOR [facility]    Bedside and Verbal shift change report given to Nelly Phoenix RN (oncoming nurse) by Mason Abad LPN   (offgoing nurse). Report included the following information SBAR, Kardex, MAR and Recent Results.     Situation:   Code Status: Full Code   Reason for Admission: [address] Day: 6   Problem List:   Hospital Problems  Date Reviewed: 4/26/2017          Codes Class Noted POA    Vitamin D deficiency (Chronic) ICD-10-CM: E55.9  ICD-9-CM: 268.9  4/22/2017 Yes    Overview Signed 4/22/2017 12:56 PM by Ena Peterson MD     Vitamin D 25-Hydroxy (4/22/2017) = 12.0             Anticoagulated on Coumadin (Chronic) ICD-10-CM: Z51.81, Z79.01  ICD-9-CM: V58.83, V58.61  Unknown Yes        Hyperuricemia (Chronic) ICD-10-CM: E79.0  ICD-9-CM: 790.6  Unknown Yes        Aftercare following surgery of the circulatory system ICD-10-CM: V91.551  ICD-9-CM: V58.73  4/21/2017 Yes    Overview Signed 4/21/2017  2:32 PM by Ena Peterson MD     S/P Right axillary to bifemoral artery bypass using an 8 mm Propaten graft from the right axilla to the right common femoral artery with a jump femoral-femoral bypass with another 8 mm Propaten external ring bypass; Right common femoral artery, and profunda femoris artery and superficial femoral artery endarterectomy causing an extensive surgery on the right side (4/4/2017 - Dr. Starlett Lefort)    S/P Cutdown of femoral-femoral bypass, more on the left groin side; Right lower extremity angiography with first-order catheterization (4/7/2017 - Dr. Starlett Lefort)    S/P Right femoral to above-knee popliteal bypass with an 8 mm PTFE graft (4/10/2017 - Dr. Tae Bowen)             * (Principal)Status post femoral-popliteal bypass surgery ICD-10-CM: Z95.828  ICD-9-CM: V45.89  4/21/2017 Yes    Overview Signed 4/21/2017  2:33 PM by Ena Peterson MD     S/P Right axillary to bifemoral artery bypass using an 8 mm Propaten graft from the right axilla to the right common femoral artery with a jump femoral-femoral bypass with another 8 mm Propaten external ring bypass; Right common femoral artery, and profunda femoris artery and superficial femoral artery endarterectomy causing an extensive surgery on the right side (4/4/2017 - Dr. Jono Ruiz)    S/P Cutdown of femoral-femoral bypass, more on the left groin side; Right lower extremity angiography with first-order catheterization (4/7/2017 - Dr. Jono Ruiz)    S/P Right femoral to above-knee popliteal bypass with an 8 mm PTFE graft (4/10/2017 - Dr. Vamshi Aly)             History of cardioversion ICD-10-CM: Z98.890  ICD-9-CM: V15.1  4/18/2017 Yes    Overview Signed 4/21/2017  2:34 PM by Hayden Carter MD     S/P Synchronized external cardioversion (4/18/2017 - Dr. Lobo Murrieta)             Chronic atrial fibrillation ([facility] (Chronic) ICD-10-CM: G36.7  ICD-9-CM: 427.31  Unknown Yes        Critical ischemia of lower extremity ICD-10-CM: I99.8  ICD-9-CM: 459.9  4/10/2017 No    Overview Signed 4/21/2017  4:41 PM by Hayden Carter MD     Right lower limb             Euthyroid sick syndrome ICD-10-CM: E07.81  ICD-9-CM: 790.94  4/6/2017 Yes        Acute blood loss as cause of postoperative anemia ICD-10-CM: D62  ICD-9-CM: 285.1  4/4/2017 Yes        Impaired mobility and ADLs ICD-10-CM: Z74.09  ICD-9-CM: 799.89  4/4/2017 Yes        Chronic ulcer of right foot (HCC) (Chronic) ICD-10-CM: L97.519  ICD-9-CM: 707.15  4/4/2017 Yes        Aortoiliac occlusive disease ([facility] (Chronic) ICD-10-CM: S24.89  ICD-9-CM: 444.09  1/25/2017 Yes        Atherosclerosis of native artery of both lower extremities with intermittent claudication (HCC) (Chronic) ICD-10-CM: L76.124  ICD-9-CM: 440.21  1/25/2017 Yes        Peripheral artery disease (HCC) (Chronic) ICD-10-CM: I73.9  ICD-9-CM: 443.9  1/25/2017 Yes        Benign hypertensive heart disease with systolic congestive heart failure, NYHA class 2 (HCC) (Chronic) ICD-10-CM: I11.0, I50.20  ICD-9-CM: 402.11, 428.20, 428.0  1/26/2015 Yes              Background:   Past Medical History:   Past Medical History:   Diagnosis Date    Acute blood loss as cause of postoperative anemia 4/4/2017    Anticoagulated on Coumadin     Aortoiliac occlusive disease ([facility] 1/25/2017    Atherosclerosis of native artery of both lower extremities with intermittent claudication ([facility] 1/25/2017    Benign hypertensive heart disease with systolic congestive heart failure, NYHA class 2 ([facility] 1/26/2015    Carotid artery disease (HCC)     Chronic atrial fibrillation (HCC)     Chronic systolic heart failure (HCC)     Chronic ulcer of right foot ([facility] 4/4/2017    Coronary artery disease involving native coronary artery of native heart 3/15/2017    Successful stenting of Cx (Xience LESLEY) and RCA (Xience LESLEY) to 0% by Dr. Mariella Sharpe on 3/15/17.     DDD (degenerative disc disease), lumbar 1/26/2015    Dyslipidemia     Erectile dysfunction 7/5/2016    Euthyroid sick syndrome 4/6/2017    Hereditary peripheral neuropathy 11/15/2016    History of cardioversion 4/18/2017    S/P Synchronized external cardioversion (4/18/2017 - Dr. Javon Moreno)    Hyperuricemia     Ischemic cardiomyopathy     Peripheral artery disease ([facility] 1/25/2017    Spinal stenosis of lumbar region with radiculopathy 5/4/2015    Dr. Charlie Vogt Vitamin D deficiency 4/22/2017    Vitamin D 25-Hydroxy (4/22/2017) = 12.0      Patient taking anticoagulants YES   Patient has a defibrillator: no     Assessment:   Changes in Assessment throughout shift: NONE                     Last Vitals:     Vitals:    04/26/17 1625 04/26/17 1930 04/27/17 0530 04/27/17 0747   BP: 110/64 121/71 120/70 118/68   Pulse: 68 76 82 71   Resp: 18 18  16   Temp: 97.6 °F (36.4 °C) 97.6 °F (36.4 °C)  97.4 °F (36.3 °C)   SpO2: 94% 100%  100%        PAIN    Pain Assessment    Pain Intensity 1: 3 (04/27/17 0800) Pain Intensity 1: 2 (12/29/14 1105)    Pain Location 1: Leg Pain Location 1: Abdomen    Pain Intervention(s) 1: Medication (see MAR) Pain Intervention(s) 1: Medication (see MAR)  Patient Stated Pain Goal: 0 Patient Stated Pain Goal: 0  o Intervention effective: YES   o Other actions taken for pain: MEDICATION     Skin Assessment  Skin color Skin Color: Appropriate for ethnicity  Condition/Temperature Skin Condition/Temp: Dry, Flaky  Integrity Skin Integrity: Incision (comment)  Turgor Turgor: Non-tenting  Weekly Pressure Ulcer Documentation  Pressure  Injury Documentation: No Pressure Injury Noted-Pressure Ulcer Prevention Initiated  Wound Prevention & Protection Methods  Orientation of wound Orientation of Wound Prevention: Posterior  Location of Prevention Location of Wound Prevention: Sacrum/Coccyx  Dressing Present Dressing Present : No  Dressing Status Dressing Status: Intact  Wound Offloading Wound Offloading (Prevention Methods): Bed, pressure redistribution/air     INTAKE/OUPUT    Date 04/26/17 0700 - 04/27/17 0659 04/27/17 0700 - 04/28/17 0659   Shift 3209-7596 7046-6072 24 Hour Total 5454-7417 4696-3981 24 Hour Total   I  N  T  A  K  E   P.O. 660  660 240  240      P. O. 660  660 240  240    Shift Total 660  660 240  240   O  U  T  P  U  T   Urine 300 2400 2700         Urine Voided 300 2400 2700         Urine Occurrence(s) 7 x 3 x 10 x       Stool  0 0         Stool Occurrence(s) 0 x 0 x 0 x         Stool  0 0       Shift Total 300 2400 2700       -2400 -2040 240  240   Weight (kg)             Recommendations:  1. Patient needs and requests: TOILETING    2. Diet: CARDIAC REG    3. Pending tests/procedures: LABS    4. Functional Level/Equipment: wheelchair    5. Estimated Discharge Date: TBD Posted on Whiteboard in Patients Room: no     HEALS Safety Check    A safety check occurred in the patient's room between off going nurse and oncoming nurse listed above.     The safety check included the below items  Area Items   H  High Alert Medications - Verify all high alert medication drips (heparin, PCA, etc.)   E  Equipment - Suction is set up for ALL patients (with cailin)  - Red plugs utilized for all equipment (IV pumps, etc.)  - WOWs wiped down at end of shift.  - Room stocked with oxygen, suction, and other unit-specific supplies   A  Alarms - Bed alarm is set for fall risk patients  - Ensure chair alarm is in place and activated if patient is up in a chair   L  Lines - Check IV for any infiltration  - Jiang bag is empty if patient has a Jiang   - Tubing and IV bags are labeled   S  Safety   - Room is clean, patient is clean, and equipment is clean. - Hallways are clear from equipment besides carts. - Fall bracelet on for fall risk patients  - Ensure room is clear and free of clutter  - Suction is set up for ALL patients (with cailin)  - Hallways are clear from equipment besides carts.    - Isolation precautions followed, supplies available outside room, sign posted

## 2017-04-27 NOTE — PROGRESS NOTES
SHIFT CHANGE NOTE FOR [facility]    Bedside and Verbal shift change report given to  Shyla Nathans oncoming nurse) by Tawanna Posey RN   (offgoing nurse). Report included the following information SBAR, Kardex, MAR and Recent Results.     Situation:   Code Status: Full Code   Reason for Admission: [address] Day: 6   Problem List:   Hospital Problems  Date Reviewed: 4/26/2017          Codes Class Noted POA    Vitamin D deficiency (Chronic) ICD-10-CM: E55.9  ICD-9-CM: 268.9  4/22/2017 Yes    Overview Signed 4/22/2017 12:56 PM by Jame Burkitt, MD     Vitamin D 25-Hydroxy (4/22/2017) = 12.0             Anticoagulated on Coumadin (Chronic) ICD-10-CM: Z51.81, Z79.01  ICD-9-CM: V58.83, V58.61  Unknown Yes        Hyperuricemia (Chronic) ICD-10-CM: E79.0  ICD-9-CM: 790.6  Unknown Yes        Aftercare following surgery of the circulatory system ICD-10-CM: O92.129  ICD-9-CM: V58.73  4/21/2017 Yes    Overview Signed 4/21/2017  2:32 PM by Jame Burkitt, MD     S/P Right axillary to bifemoral artery bypass using an 8 mm Propaten graft from the right axilla to the right common femoral artery with a jump femoral-femoral bypass with another 8 mm Propaten external ring bypass; Right common femoral artery, and profunda femoris artery and superficial femoral artery endarterectomy causing an extensive surgery on the right side (4/4/2017 - Dr. Atul Kirkland)    S/P Cutdown of femoral-femoral bypass, more on the left groin side; Right lower extremity angiography with first-order catheterization (4/7/2017 - Dr. Atul Kirkland)    S/P Right femoral to above-knee popliteal bypass with an 8 mm PTFE graft (4/10/2017 - Dr. Swapna Velasquez)             * (Principal)Status post femoral-popliteal bypass surgery ICD-10-CM: Z95.828  ICD-9-CM: V45.89  4/21/2017 Yes    Overview Signed 4/21/2017  2:33 PM by Jame Burkitt, MD     S/P Right axillary to bifemoral artery bypass using an 8 mm Propaten graft from the right axilla to the right common femoral artery with a jump femoral-femoral bypass with another 8 mm Propaten external ring bypass; Right common femoral artery, and profunda femoris artery and superficial femoral artery endarterectomy causing an extensive surgery on the right side (4/4/2017 - Dr. Cait Garcia)    S/P Cutdown of femoral-femoral bypass, more on the left groin side; Right lower extremity angiography with first-order catheterization (4/7/2017 - Dr. Cait Garcia)    S/P Right femoral to above-knee popliteal bypass with an 8 mm PTFE graft (4/10/2017 - Dr. Jazmine Rodriguez)             History of cardioversion ICD-10-CM: Z98.890  ICD-9-CM: V15.1  4/18/2017 Yes    Overview Signed 4/21/2017  2:34 PM by Renetta Nagel MD     S/P Synchronized external cardioversion (4/18/2017 - Dr. Andrew Sahu)             Chronic atrial fibrillation ([facility] (Chronic) ICD-10-CM: I56.0  ICD-9-CM: 427.31  Unknown Yes        Critical ischemia of lower extremity ICD-10-CM: I99.8  ICD-9-CM: 459.9  4/10/2017 No    Overview Signed 4/21/2017  4:41 PM by Renetta Nagel MD     Right lower limb             Euthyroid sick syndrome ICD-10-CM: E07.81  ICD-9-CM: 790.94  4/6/2017 Yes        Acute blood loss as cause of postoperative anemia ICD-10-CM: D62  ICD-9-CM: 285.1  4/4/2017 Yes        Impaired mobility and ADLs ICD-10-CM: Z74.09  ICD-9-CM: 799.89  4/4/2017 Yes        Chronic ulcer of right foot (HCC) (Chronic) ICD-10-CM: L97.519  ICD-9-CM: 707.15  4/4/2017 Yes        Aortoiliac occlusive disease ([facility] (Chronic) ICD-10-CM: N06.53  ICD-9-CM: 444.09  1/25/2017 Yes        Atherosclerosis of native artery of both lower extremities with intermittent claudication (HCC) (Chronic) ICD-10-CM: Z18.421  ICD-9-CM: 440.21  1/25/2017 Yes        Peripheral artery disease (HCC) (Chronic) ICD-10-CM: I73.9  ICD-9-CM: 443.9  1/25/2017 Yes        Benign hypertensive heart disease with systolic congestive heart failure, NYHA class 2 (HCC) (Chronic) ICD-10-CM: I11.0, I50.20  ICD-9-CM: 402.11, 428.20, 428.0  1/26/2015 Yes              Background:   Past Medical History:   Past Medical History:   Diagnosis Date    Acute blood loss as cause of postoperative anemia 4/4/2017    Anticoagulated on Coumadin     [facility] 1/25/2017    Atherosclerosis of native artery of both lower extremities with intermittent claudication ([facility] 1/25/2017    Benign hypertensive heart disease with systolic congestive heart failure, NYHA class 2 ([facility] 1/26/2015    Carotid artery disease (HCC)     Chronic atrial fibrillation (HCC)     Chronic systolic heart failure (HCC)     Chronic ulcer of right foot ([facility] 4/4/2017    Coronary artery disease involving native coronary artery of native heart 3/15/2017    Successful stenting of Cx (Xience LESLEY) and RCA (Xience LESLEY) to 0% by Dr. Dulce Reyes on 3/15/17.     DDD (degenerative disc disease), lumbar 1/26/2015    Dyslipidemia     Erectile dysfunction 7/5/2016    Euthyroid sick syndrome 4/6/2017    Hereditary peripheral neuropathy 11/15/2016    History of cardioversion 4/18/2017    S/P Synchronized external cardioversion (4/18/2017 - Dr. Sridhar Romero)    Hyperuricemia     Ischemic cardiomyopathy     Peripheral artery disease ([facility] 1/25/2017    Spinal stenosis of lumbar region with radiculopathy 5/4/2015    Dr. Renetta Cabrera Vitamin D deficiency 4/22/2017    Vitamin D 25-Hydroxy (4/22/2017) = 12.0      Patient taking anticoagulants YES   Patient has a defibrillator: no     Assessment:   Changes in Assessment throughout shift: NONE                     Last Vitals:     Vitals:    04/26/17 1141 04/26/17 1625 04/26/17 1930 04/27/17 0530   BP: 111/62 110/64 121/71 120/70   Pulse:  68 76 82   Resp:  18 18    Temp:  97.6 °F (36.4 °C) 97.6 °F (36.4 °C)    SpO2:  94% 100%         PAIN    Pain Assessment    Pain Intensity 1: 0 (04/27/17 0400) Pain Intensity 1: 2 (12/29/14 1105)    Pain Location 1: Leg Pain Location 1: Abdomen    Pain Intervention(s) 1: Medication (see MAR) Pain Intervention(s) 1: Medication (see MAR)  Patient Stated Pain Goal: 0 Patient Stated Pain Goal: 0  o Intervention effective: YES   o Other actions taken for pain: MEDICATION     Skin Assessment  Skin color Skin Color: Appropriate for ethnicity  Condition/Temperature Skin Condition/Temp: Dry, Flaky  Integrity Skin Integrity: Incision (comment)  Turgor Turgor: Non-tenting  Weekly Pressure Ulcer Documentation  Pressure  Injury Documentation: No Pressure Injury Noted-Pressure Ulcer Prevention Initiated  Wound Prevention & Protection Methods  Orientation of wound Orientation of Wound Prevention: Posterior  Location of Prevention Location of Wound Prevention: Sacrum/Coccyx  Dressing Present Dressing Present : No  Dressing Status Dressing Status: Intact  Wound Offloading Wound Offloading (Prevention Methods): Bed, pressure redistribution/air, Heel boots, Specialty boot/shoe     INTAKE/OUPUT    Date 04/26/17 0700 - 04/27/17 0659 04/27/17 0700 - 04/28/17 0659   Shift 3834-8088 4358-8930 24 Hour Total 4004-9931 6366-0363 24 Hour Total   I  N  T  A  K  E   P.O. 660  660         P. O. 660  660       Shift Total 660  660      O  U  T  P  U  T   Urine 300 2400 2700         Urine Voided 300 2400 2700         Urine Occurrence(s) 7 x 3 x 10 x       Stool  0 0         Stool Occurrence(s) 0 x 0 x 0 x         Stool  0 0       Shift Total 300 2400 2700       -2400 -2040      Weight (kg)             Recommendations:  1. Patient needs and requests: TOILETING    2. Diet: CARDIAC REG    3. Pending tests/procedures: LABS    4. Functional Level/Equipment: wheelchair    5. Estimated Discharge Date: TBD Posted on Whiteboard in Patients Room: no     HEALS Safety Check    A safety check occurred in the patient's room between off going nurse and oncoming nurse listed above.     The safety check included the below items  Area Items   H  High Alert Medications - Verify all high alert medication drips (heparin, PCA, etc.)   E  Equipment - Suction is set up for ALL patients (with cailin)  - Red plugs utilized for all equipment (IV pumps, etc.)  - WOWs wiped down at end of shift.  - Room stocked with oxygen, suction, and other unit-specific supplies   A  Alarms - Bed alarm is set for fall risk patients  - Ensure chair alarm is in place and activated if patient is up in a chair   L  Lines - Check IV for any infiltration  - Jiang bag is empty if patient has a Jiang   - Tubing and IV bags are labeled   S  Safety   - Room is clean, patient is clean, and equipment is clean. - Hallways are clear from equipment besides carts. - Fall bracelet on for fall risk patients  - Ensure room is clear and free of clutter  - Suction is set up for ALL patients (with cailin)  - Hallways are clear from equipment besides carts.    - Isolation precautions followed, supplies available outside room, sign posted

## 2017-04-27 NOTE — PROGRESS NOTES
Sw provided a verbal clinical update to pt's insurance for an auth extension request. Dary will follow to determine if extension is granted.

## 2017-04-28 ENCOUNTER — HOME HEALTH ADMISSION (OUTPATIENT)
Dept: HOME HEALTH SERVICES | Facility: HOME HEALTH | Age: 57
End: 2017-04-28
Payer: COMMERCIAL

## 2017-04-28 DIAGNOSIS — I73.9 PAD (PERIPHERAL ARTERY DISEASE) (HCC): ICD-10-CM

## 2017-04-28 DIAGNOSIS — I70.213 ATHEROSCLEROSIS OF NATIVE ARTERY OF BOTH LOWER EXTREMITIES WITH INTERMITTENT CLAUDICATION (HCC): ICD-10-CM

## 2017-04-28 DIAGNOSIS — I74.09 AORTOILIAC OCCLUSIVE DISEASE (HCC): ICD-10-CM

## 2017-04-28 LAB
INR PPP: 2.3 (ref 0.8–1.2)
PROTHROMBIN TIME: 24.3 SEC (ref 11.5–15.2)

## 2017-04-28 PROCEDURE — 65310000000 HC RM PRIVATE REHAB

## 2017-04-28 PROCEDURE — 77030018836 HC SOL IRR NACL ICUM -A

## 2017-04-28 PROCEDURE — 97535 SELF CARE MNGMENT TRAINING: CPT

## 2017-04-28 PROCEDURE — 74011250637 HC RX REV CODE- 250/637: Performed by: INTERNAL MEDICINE

## 2017-04-28 PROCEDURE — 97116 GAIT TRAINING THERAPY: CPT

## 2017-04-28 PROCEDURE — 97110 THERAPEUTIC EXERCISES: CPT

## 2017-04-28 PROCEDURE — 36415 COLL VENOUS BLD VENIPUNCTURE: CPT | Performed by: INTERNAL MEDICINE

## 2017-04-28 PROCEDURE — 74011250637 HC RX REV CODE- 250/637: Performed by: EMERGENCY MEDICINE

## 2017-04-28 PROCEDURE — 97530 THERAPEUTIC ACTIVITIES: CPT

## 2017-04-28 PROCEDURE — 85610 PROTHROMBIN TIME: CPT | Performed by: INTERNAL MEDICINE

## 2017-04-28 RX ORDER — AMIODARONE HYDROCHLORIDE 400 MG/1
400 TABLET ORAL 2 TIMES DAILY
Qty: 7 TAB | Refills: 0 | Status: SHIPPED | OUTPATIENT
Start: 2017-04-28 | End: 2017-05-05 | Stop reason: SDUPTHER

## 2017-04-28 RX ORDER — GUAIFENESIN 100 MG/5ML
81 LIQUID (ML) ORAL
Qty: 30 TAB | Refills: 0 | Status: SHIPPED | OUTPATIENT
Start: 2017-04-28 | End: 2017-06-02 | Stop reason: SDUPTHER

## 2017-04-28 RX ORDER — MELATONIN
1000 DAILY
Qty: 30 TAB | Refills: 0 | Status: SHIPPED | OUTPATIENT
Start: 2017-04-28 | End: 2017-06-02 | Stop reason: SDUPTHER

## 2017-04-28 RX ORDER — METOPROLOL TARTRATE 25 MG/1
12.5 TABLET, FILM COATED ORAL EVERY 12 HOURS
Qty: 30 TAB | Refills: 0 | Status: SHIPPED | OUTPATIENT
Start: 2017-04-28 | End: 2017-06-02 | Stop reason: SDUPTHER

## 2017-04-28 RX ORDER — GABAPENTIN 100 MG/1
100 CAPSULE ORAL 2 TIMES DAILY
Qty: 60 CAP | Refills: 0 | Status: SHIPPED | OUTPATIENT
Start: 2017-04-28 | End: 2017-06-19 | Stop reason: SDUPTHER

## 2017-04-28 RX ORDER — BISACODYL 5 MG
10 TABLET, DELAYED RELEASE (ENTERIC COATED) ORAL
Qty: 14 TAB | Refills: 0 | Status: SHIPPED | OUTPATIENT
Start: 2017-04-28 | End: 2017-06-02

## 2017-04-28 RX ORDER — AMIODARONE HYDROCHLORIDE 200 MG/1
200 TABLET ORAL 2 TIMES DAILY
Qty: 60 TAB | Refills: 0 | Status: SHIPPED | OUTPATIENT
Start: 2017-05-03 | End: 2017-06-02 | Stop reason: SDUPTHER

## 2017-04-28 RX ORDER — DOCUSATE SODIUM 100 MG/1
100 CAPSULE, LIQUID FILLED ORAL 2 TIMES DAILY
Qty: 60 CAP | Refills: 0 | Status: SHIPPED | OUTPATIENT
Start: 2017-04-28 | End: 2017-06-02

## 2017-04-28 RX ORDER — LANOLIN ALCOHOL/MO/W.PET/CERES
325 CREAM (GRAM) TOPICAL
Qty: 30 TAB | Refills: 0 | Status: SHIPPED | OUTPATIENT
Start: 2017-04-28 | End: 2017-06-02 | Stop reason: SDUPTHER

## 2017-04-28 RX ORDER — OXYCODONE AND ACETAMINOPHEN 10; 325 MG/1; MG/1
1 TABLET ORAL
Qty: 20 TAB | Refills: 0 | Status: SHIPPED | OUTPATIENT
Start: 2017-04-28 | End: 2017-05-12 | Stop reason: SDUPTHER

## 2017-04-28 RX ORDER — CLOPIDOGREL BISULFATE 75 MG/1
75 TABLET ORAL
Qty: 30 TAB | Refills: 0 | Status: SHIPPED | OUTPATIENT
Start: 2017-04-28 | End: 2017-06-02 | Stop reason: SDUPTHER

## 2017-04-28 RX ORDER — ATORVASTATIN CALCIUM 40 MG/1
40 TABLET, FILM COATED ORAL
Qty: 30 TAB | Refills: 0 | Status: SHIPPED | OUTPATIENT
Start: 2017-04-28 | End: 2017-06-02 | Stop reason: SDUPTHER

## 2017-04-28 RX ORDER — LOSARTAN POTASSIUM 25 MG/1
25 TABLET ORAL DAILY
Qty: 30 TAB | Refills: 0 | Status: SHIPPED | OUTPATIENT
Start: 2017-04-28 | End: 2017-05-02 | Stop reason: SDUPTHER

## 2017-04-28 RX ORDER — WARFARIN 2.5 MG/1
TABLET ORAL
Qty: 30 TAB | Refills: 0 | Status: SHIPPED | OUTPATIENT
Start: 2017-04-28 | End: 2017-06-02 | Stop reason: SDUPTHER

## 2017-04-28 RX ORDER — DULOXETIN HYDROCHLORIDE 60 MG/1
60 CAPSULE, DELAYED RELEASE ORAL DAILY
Qty: 30 CAP | Refills: 0 | Status: SHIPPED | OUTPATIENT
Start: 2017-04-28 | End: 2017-06-02 | Stop reason: SDUPTHER

## 2017-04-28 RX ORDER — ASCORBIC ACID 250 MG
250 TABLET ORAL
Qty: 30 TAB | Refills: 0 | Status: SHIPPED | OUTPATIENT
Start: 2017-04-28 | End: 2017-06-02 | Stop reason: SDUPTHER

## 2017-04-28 RX ORDER — WARFARIN SODIUM 5 MG/1
5 TABLET ORAL ONCE
Status: COMPLETED | OUTPATIENT
Start: 2017-04-28 | End: 2017-04-28

## 2017-04-28 RX ORDER — ZINC SULFATE 50(220)MG
220 CAPSULE ORAL DAILY
Qty: 30 CAP | Refills: 0 | Status: SHIPPED | OUTPATIENT
Start: 2017-04-28 | End: 2017-06-02 | Stop reason: SDUPTHER

## 2017-04-28 RX ORDER — DILTIAZEM HYDROCHLORIDE 30 MG/1
30 TABLET, FILM COATED ORAL
Qty: 120 TAB | Refills: 0 | Status: SHIPPED | OUTPATIENT
Start: 2017-04-28 | End: 2017-05-31 | Stop reason: SDUPTHER

## 2017-04-28 RX ADMIN — ASPIRIN 81 MG CHEWABLE TABLET 81 MG: 81 TABLET CHEWABLE at 09:05

## 2017-04-28 RX ADMIN — LACTOBACILLUS TAB 2 TABLET: TAB at 18:55

## 2017-04-28 RX ADMIN — DILTIAZEM HYDROCHLORIDE 30 MG: 30 TABLET, FILM COATED ORAL at 06:03

## 2017-04-28 RX ADMIN — Medication 5000 UNITS: at 09:05

## 2017-04-28 RX ADMIN — WARFARIN SODIUM 5 MG: 5 TABLET ORAL at 18:57

## 2017-04-28 RX ADMIN — GABAPENTIN 100 MG: 100 CAPSULE ORAL at 09:05

## 2017-04-28 RX ADMIN — OXYCODONE HYDROCHLORIDE AND ACETAMINOPHEN 1 TABLET: 10; 325 TABLET ORAL at 09:53

## 2017-04-28 RX ADMIN — DILTIAZEM HYDROCHLORIDE 30 MG: 30 TABLET, FILM COATED ORAL at 18:55

## 2017-04-28 RX ADMIN — CLOPIDOGREL BISULFATE 75 MG: 75 TABLET, FILM COATED ORAL at 18:57

## 2017-04-28 RX ADMIN — OXYCODONE HYDROCHLORIDE AND ACETAMINOPHEN 1 TABLET: 10; 325 TABLET ORAL at 14:06

## 2017-04-28 RX ADMIN — ZINC SULFATE 220 MG (50 MG) CAPSULE 220 MG: CAPSULE at 09:05

## 2017-04-28 RX ADMIN — GABAPENTIN 100 MG: 100 CAPSULE ORAL at 18:57

## 2017-04-28 RX ADMIN — DILTIAZEM HYDROCHLORIDE 30 MG: 30 TABLET, FILM COATED ORAL at 11:30

## 2017-04-28 RX ADMIN — FERROUS SULFATE TAB 325 MG (65 MG ELEMENTAL FE) 325 MG: 325 (65 FE) TAB at 09:06

## 2017-04-28 RX ADMIN — LACTOBACILLUS TAB 2 TABLET: TAB at 09:04

## 2017-04-28 RX ADMIN — DULOXETINE HYDROCHLORIDE 60 MG: 60 CAPSULE, DELAYED RELEASE ORAL at 09:05

## 2017-04-28 RX ADMIN — OXYCODONE HYDROCHLORIDE AND ACETAMINOPHEN 1 TABLET: 10; 325 TABLET ORAL at 05:42

## 2017-04-28 RX ADMIN — ATORVASTATIN CALCIUM 40 MG: 40 TABLET, FILM COATED ORAL at 22:12

## 2017-04-28 RX ADMIN — AMIODARONE HYDROCHLORIDE 400 MG: 200 TABLET ORAL at 18:57

## 2017-04-28 RX ADMIN — DOCUSATE SODIUM 100 MG: 100 CAPSULE, LIQUID FILLED ORAL at 09:05

## 2017-04-28 RX ADMIN — OXYCODONE HYDROCHLORIDE AND ACETAMINOPHEN 1 TABLET: 10; 325 TABLET ORAL at 18:55

## 2017-04-28 RX ADMIN — OXYCODONE HYDROCHLORIDE AND ACETAMINOPHEN 1 TABLET: 10; 325 TABLET ORAL at 02:02

## 2017-04-28 RX ADMIN — DOCUSATE SODIUM 100 MG: 100 CAPSULE, LIQUID FILLED ORAL at 18:57

## 2017-04-28 RX ADMIN — METOPROLOL TARTRATE 12.5 MG: 25 TABLET ORAL at 22:12

## 2017-04-28 RX ADMIN — DILTIAZEM HYDROCHLORIDE 30 MG: 30 TABLET, FILM COATED ORAL at 22:12

## 2017-04-28 RX ADMIN — AMIODARONE HYDROCHLORIDE 400 MG: 200 TABLET ORAL at 09:06

## 2017-04-28 RX ADMIN — Medication 250 MG: at 09:04

## 2017-04-28 NOTE — PROGRESS NOTES
[facility]    Bedside and Verbal shift change report given to Danilo Broussard RN (oncoming nurse) by Tita Munson LPN   (offgoing nurse). Report included the following information SBAR, Kardex, MAR and Recent Results.     Situation:   Code Status: Full Code   Reason for Admission: [address] Day: 7   Problem List:   Hospital Problems  Date Reviewed: 4/26/2017          Codes Class Noted POA    Vitamin D deficiency (Chronic) ICD-10-CM: E55.9  ICD-9-CM: 268.9  4/22/2017 Yes    Overview Signed 4/22/2017 12:56 PM by Genie Negron MD     Vitamin D 25-Hydroxy (4/22/2017) = 12.0             Anticoagulated on Coumadin (Chronic) ICD-10-CM: Z51.81, Z79.01  ICD-9-CM: V58.83, V58.61  Unknown Yes        Hyperuricemia (Chronic) ICD-10-CM: E79.0  ICD-9-CM: 790.6  Unknown Yes        Aftercare following surgery of the circulatory system ICD-10-CM: V77.058  ICD-9-CM: V58.73  4/21/2017 Yes    Overview Signed 4/21/2017  2:32 PM by Genie Negron MD     S/P Right axillary to bifemoral artery bypass using an 8 mm Propaten graft from the right axilla to the right common femoral artery with a jump femoral-femoral bypass with another 8 mm Propaten external ring bypass; Right common femoral artery, and profunda femoris artery and superficial femoral artery endarterectomy causing an extensive surgery on the right side (4/4/2017 - Dr. Willow Wong)    S/P Cutdown of femoral-femoral bypass, more on the left groin side; Right lower extremity angiography with first-order catheterization (4/7/2017 - Dr. Willow Wong)    S/P Right femoral to above-knee popliteal bypass with an 8 mm PTFE graft (4/10/2017 - Dr. Santana Gerard)             * (Principal)Status post femoral-popliteal bypass surgery ICD-10-CM: Z95.828  ICD-9-CM: V45.89  4/21/2017 Yes    Overview Signed 4/21/2017  2:33 PM by Genie Negron MD     S/P Right axillary to bifemoral artery bypass using an 8 mm Propaten graft from the right axilla to the right common femoral artery with a jump femoral-femoral bypass with another 8 mm Propaten external ring bypass; Right common femoral artery, and profunda femoris artery and superficial femoral artery endarterectomy causing an extensive surgery on the right side (4/4/2017 - Dr. Boby Lawler)    S/P Cutdown of femoral-femoral bypass, more on the left groin side; Right lower extremity angiography with first-order catheterization (4/7/2017 - Dr. Boby Lawler)    S/P Right femoral to above-knee popliteal bypass with an 8 mm PTFE graft (4/10/2017 - Dr. Bruno Vásquez)             History of cardioversion ICD-10-CM: Z98.890  ICD-9-CM: V15.1  4/18/2017 Yes    Overview Signed 4/21/2017  2:34 PM by Connie Ramirez MD     S/P Synchronized external cardioversion (4/18/2017 - Dr. Bee Stout)             Ch[facility] ([facility] (Chronic) ICD-10-CM: B80.0  ICD-9-CM: 427.31  Unknown Yes        Critical ischemia of lower extremity ICD-10-CM: I99.8  ICD-9-CM: 459.9  4/10/2017 No    Overview Signed 4/21/2017  4:41 PM by Conine Ramirez MD     Right lower limb             Euthyroid sick syndrome ICD-10-CM: E07.81  ICD-9-CM: 790.94  4/6/2017 Yes        Acute blood loss as cause of postoperative anemia ICD-10-CM: D62  ICD-9-CM: 285.1  4/4/2017 Yes        Impaired mobility and ADLs ICD-10-CM: Z74.09  ICD-9-CM: 799.89  4/4/2017 Yes        Chronic ulcer of right foot (HCC) (Chronic) ICD-10-CM: L97.519  ICD-9-CM: 707.15  4/4/2017 Yes        Aortoiliac occlusive disease ([facility] (Chronic) ICD-10-CM: V95.07  ICD-9-CM: 444.09  1/25/2017 Yes        Atherosclerosis of native artery of both lower extremities with intermittent claudication (HCC) (Chronic) ICD-10-CM: L24.197  ICD-9-CM: 440.21  1/25/2017 Yes        Peripheral artery disease (HCC) (Chronic) ICD-10-CM: I73.9  ICD-9-CM: 443.9  1/25/2017 Yes        Benign hypertensive heart disease with systolic congestive heart failure, NYHA class 2 (HCC) (Chronic) ICD-10-CM: I11.0, I50.20  ICD-9-CM: 402.11, 428.20, 428.0  1/26/2015 Yes              Background:   Past Medical History:   Past Medical History:   Diagnosis Date    Acute blood loss as cause of postoperative anemia 4/4/2017    Anticoagulated on Coumadin     Aortoiliac occlusive disease ([facility] 1/25/2017    Atherosclerosis of native artery of both lower extremities with intermittent claudication ([facility] 1/25/2017    Benign hypertensive heart disease with systolic congestive heart failure, NYHA class 2 ([facility] 1/26/2015    Carotid artery disease (HCC)     Chronic atrial fibrillation (HCC)     Chronic systolic heart failure (HCC)     Chronic ulcer of right foot ([facility] 4/4/2017    Coronary artery disease involving native coronary artery of native heart 3/15/2017    Successful stenting of Cx (Xience LESLEY) and RCA (Xience LESLEY) to 0% by Dr. Lizeth Pinto on 3/15/17.     DDD (degenerative disc disease), lumbar 1/26/2015    Dyslipidemia     Erectile dysfunction 7/5/2016    Euthyroid sick syndrome 4/6/2017    Hereditary peripheral neuropathy 11/15/2016    History of cardioversion 4/18/2017    S/P Synchronized external cardioversion (4/18/2017 - Dr. Shiraz Ramey)    Hyperuricemia     Ischemic cardiomyopathy     Peripheral artery disease ([facility] 1/25/2017    Spinal stenosis of lumbar region with radiculopathy 5/4/2015    Dr. Marie Gamino Vitamin D deficiency 4/22/2017    Vitamin D 25-Hydroxy (4/22/2017) = 12.0      Patient taking anticoagulants YES   Patient has a defibrillator: no     Assessment:   Changes in Assessment throughout shift: NONE                     Last Vitals:     Vitals:    04/27/17 0747 04/27/17 1632 04/27/17 1930 04/28/17 0603   BP: 118/68 108/68 124/67 120/60   Pulse: 71 79 75 63   Resp: 16 20 20    Temp: 97.4 °F (36.3 °C) 97.9 °F (36.6 °C) 97.9 °F (36.6 °C)    SpO2: 100% 100% 92%         PAIN    Pain Assessment    Pain Intensity 1: 8 (04/28/17 0544) Pain Intensity 1: 2 (12/29/14 1105)    Pain Location 1: Foot Pain Location 1: Abdomen    Pain Intervention(s) 1: Medication (see MAR) Pain Intervention(s) 1: Medication (see MAR)  Patient Stated Pain Goal: 0 Patient Stated Pain Goal: 0  o Intervention effective: YES   o Other actions taken for pain: MEDICATION     Skin Assessment  Skin color Skin Color: Appropriate for ethnicity  Condition/Temperature Skin Condition/Temp: Dry, Warm  Integrity Skin Integrity: Incision (comment)  Turgor Turgor: Non-tenting  Weekly Pressure Ulcer Documentation  Pressure  Injury Documentation: No Pressure Injury Noted-Pressure Ulcer Prevention Initiated  Wound Prevention & Protection Methods  Orientation of wound Orientation of Wound Prevention: Posterior  Location of Prevention Location of Wound Prevention: Heel, Sacrum/Coccyx  Dressing Present Dressing Present :  (heel)  Dressing Status Dressing Status: Intact  Wound Offloading Wound Offloading (Prevention Methods): Bed, pressure redistribution/air     INTAKE/OUPUT    Date 04/27/17 0700 - 04/28/17 0659 04/28/17 0700 - 04/29/17 0659   Shift 5462-3335 8856-8887 24 Hour Total 3162-6173 6951-3197 24 Hour Total   I  N  T  A  K  E   P.O. 716  716         P. O. 716  716       Shift Total 716  716      O  U  T  P  U  T   Urine  2100 2100         Urine Voided  2100 2100         Urine Occurrence(s) 4 x 2 x 6 x       Stool            Stool Occurrence(s) 1 x 1 x 2 x       Shift Total  2100 2100       -6570 -9435      Weight (kg)             Recommendations:  1. Patient needs and requests: TOILETING    2. Diet: CARDIAC REG    3. Pending tests/procedures: LABS    4. Functional Level/Equipment: wheelchair    5. Estimated Discharge Date: TBD Posted on Whiteboard in Patients Room: no     HEALS Safety Check    A safety check occurred in the patient's room between off going nurse and oncoming nurse listed above.     The safety check included the below items  Area Items   H  High Alert Medications - Verify all high alert medication drips (heparin, PCA, etc.)   E  Equipment - Suction is set up for ALL patients (with cailin)  - Red plugs utilized for all equipment (IV pumps, etc.)  - WOWs wiped down at end of shift.  - Room stocked with oxygen, suction, and other unit-specific supplies   A  Alarms - Bed alarm is set for fall risk patients  - Ensure chair alarm is in place and activated if patient is up in a chair   L  Lines - Check IV for any infiltration  - Jiang bag is empty if patient has a Jiang   - Tubing and IV bags are labeled   S  Safety   - Room is clean, patient is clean, and equipment is clean. - Hallways are clear from equipment besides carts. - Fall bracelet on for fall risk patients  - Ensure room is clear and free of clutter  - Suction is set up for ALL patients (with cailin)  - Hallways are clear from equipment besides carts.    - Isolation precautions followed, supplies available outside room, sign posted

## 2017-04-28 NOTE — PROGRESS NOTES
[facility] PHYSICAL REHABILITATION  [address] [place] [address], [address]     INPATIENT REHABILITATION  DAILY PROGRESS NOTE     Date: 4/28/2017    Name: Kamila Gonzales Age / Sex: 59 y.o. / male   CSN: [id number] MRN: [id number]   [facility] Date: 4/21/2017 Length of Stay: 7 days     Primary Rehab Diagnosis: Impaired Mobility and ADLs secondary to:  1. S/P Right axillary to bifemoral artery bypass using an 8 mm Propaten graft from the right axilla to the right common femoral artery with a jump femoral-femoral bypass with another 8 mm Propaten external ring bypass; Right common femoral artery, and profunda femoris artery and superficial femoral artery endarterectomy causing an extensive surgery on the right side (4/4/2017 - Dr. Jayla Mondragon); S/P Cutdown of femoral-femoral bypass, more on the left groin side; Right lower extremity angiography with first-order catheterization (4/7/2017 - Dr. Jayla Mondragon); S/P Right femoral to above-knee popliteal bypass with an 8 mm PTFE graft (4/10/2017 - Dr. Alexis Huff)  2. History of critical ischemia of the right lower limb secondary to peripheral arterial disease      Subjective:     Patient in  NAD, sitting in a chair.  Brother at bedside      Objective:     Vital Signs:  Patient Vitals for the past 24 hrs:   BP Temp Pulse Resp SpO2   04/28/17 1614 119/67 97 °F (36.1 °C) 69 17 100 %   04/28/17 0744 118/62 97.1 °F (36.2 °C) 65 18 96 %   04/28/17 0603 120/60 - 63 - -   04/27/17 1930 124/67 97.9 °F (36.6 °C) 75 20 92 %        General:  Awake, alert  Cardiovascular:  S1S2+, RRR  Pulmonary:  CTA b/l  GI:  Soft, BS+, NT, ND  Extremities:  No edema  Extremities:  Right foot ulcer with dressing        Current Medications:  Current Facility-Administered Medications   Medication Dose Route Frequency    warfarin (COUMADIN) tablet 5 mg  5 mg Oral ONCE    losartan (COZAAR) tablet 25 mg  25 mg Oral DAILY    metoprolol tartrate (LOPRESSOR) tablet 12.5 mg  12.5 mg Oral Q12H    dilTIAZem (CARDIZEM) IR tablet 30 mg  30 mg Oral AC&HS    pneumococcal 23-valent (PNEUMOVAX 23) injection 0.5 mL  0.5 mL IntraMUSCular PRIOR TO DISCHARGE    cholecalciferol (VITAMIN D3) capsule 5,000 Units  5,000 Units Oral DAILY    ferrous sulfate tablet 325 mg  1 Tab Oral DAILY WITH BREAKFAST    ascorbic acid (vitamin C) (VITAMIN C) tablet 250 mg  250 mg Oral DAILY WITH BREAKFAST    acetaminophen (TYLENOL) tablet 650 mg  650 mg Oral Q4H PRN    docusate sodium (COLACE) capsule 100 mg  100 mg Oral BID    bisacodyl (DULCOLAX) tablet 10 mg  10 mg Oral Q48H PRN    amiodarone (CORDARONE) tablet 400 mg  400 mg Oral BID    oxyCODONE-acetaminophen (PERCOCET 10)  mg per tablet 1 Tab  1 Tab Oral Q4H PRN    clopidogrel (PLAVIX) tablet 75 mg  75 mg Oral DAILY WITH DINNER    aspirin chewable tablet 81 mg  81 mg Oral DAILY WITH BREAKFAST    DULoxetine (CYMBALTA) capsule 60 mg  60 mg Oral DAILY    Lactobacillus Acidoph & Bulgar (FLORANEX) tablet 2 Tab  2 Tab Oral BID    [START ON 5/3/2017] amiodarone (CORDARONE) tablet 200 mg  200 mg Oral BID    WARFARIN INFORMATION NOTE (COUMADIN)   Other Q24H    gabapentin (NEURONTIN) capsule 100 mg  100 mg Oral BID    zinc sulfate (ZINCATE) capsule 220 mg  1 Cap Oral DAILY    atorvastatin (LIPITOR) tablet 40 mg  40 mg Oral QHS       Allergies:  No Known Allergies      Functional Progress:    PHYSICAL THERAPY    ON ADMISSION MOST RECENT   Wheelchair Mobility/Management  Able to Propel (ft): 300 feet  Functional Level: 6  Curbs/Ramps Assist Required (FIM Score): 1 (Total assistance)  Wheelchair Setup Assist Required : 6 (Modified independent)  Wheelchair Management: Manages left brake, Manages right brake Wheelchair Mobility/Management  Able to Propel (ft): 300 feet  Functional Level: 6  Curbs/Ramps Assist Required (FIM Score): 1 (Total assistance)  Wheelchair Setup Assist Required : 6 (Modified independent)  Wheelchair Management: Manages left brake, Manages right brake     Gait  Amount of Assistance: 4 (Contact guard assistance)  Distance (ft): 25 Feet (ft)  Assistive Device: Walker, rolling Gait  Amount of Assistance: 5 (Supervision/setup)  Distance (ft): 60 Feet (ft)  Assistive Device: Walker, rolling     Balance-Sitting/Standing  Sitting - Static: Good (unsupported)  Sitting - Dynamic: Good (unsupported)  Standing - Static: Good  Standing - Dynamic : Impaired Balance-Sitting/Standing  Sitting - Static: Good (unsupported)  Sitting - Dynamic: Good (unsupported)  Standing - Static: Fair  Standing - Dynamic : Impaired     Bed/Mat Mobility  Rolling Right : 5 (Supervision)  Rolling Left : 5 (Supervision)  Supine to Sit : 5 (Supervision)  Sit to Supine : 5 (Supervision) Bed/Mat Mobility  Rolling Right : 6 (Modified independent)  Rolling Left : 6 (Modified independent)  Supine to Sit : 6 (Modified independent)  Sit to Supine : 6 (Modified independent)     Transfers  Transfer Type: Other  Other: stand step without AD  Transfer Assistance : 4 (Contact guard assistance)  Sit to Stand Assistance: Contact guard assistance Transfers  Transfer Type: Other  Other: stand step with rolling walker  Transfer Assistance : 5 (Supervision/setup)  Sit to Stand Assistance: Supervision     Steps or Stairs  Steps/Stairs Ambulated (#): 4  Level of Assist : 4 (Contact guard assistance)  Rail Use: Both Steps or Stairs  Steps/Stairs Ambulated (#): 4  Level of Assist : 4 (Contact guard assistance)  Rail Use: Both         Lab/Data Review:  Recent Results (from the past 24 hour(s))   PROTHROMBIN TIME + INR    Collection Time: 04/28/17  6:15 AM   Result Value Ref Range    Prothrombin time 24.3 (H) 11.5 - 15.2 sec    INR 2.3 (H) 0.8 - 1.2             Assessment:     Primary Rehabilitation Diagnosis  1. Impaired Mobility and ADLs  2.  S/P Right axillary to bifemoral artery bypass using an 8 mm Propaten graft from the right axilla to the right common femoral artery with a jump femoral-femoral bypass with another 8 mm Propaten external ring bypass; Right common femoral artery, and profunda femoris artery and superficial femoral artery endarterectomy causing an extensive surgery on the right side (4/4/2017 - Dr. Nando Weaver); S/P Cutdown of femoral-femoral bypass, more on the left groin side; Right lower extremity angiography with first-order catheterization (4/7/2017 - Dr. Nando Weaver); S/P Right femoral to above-knee popliteal bypass with an 8 mm PTFE graft (4/10/2017 - Dr. Tiffany Herrera)  3. History of critical ischemia of the right lower limb secondary to peripheral arterial disease      Comorbidities   Benign hypertensive heart disease with systolic congestive heart failure, NYHA class 2     History of vitamin B12 deficiency    DDD (degenerative disc disease), lumbar    Erectile dysfunction    Spinal stenosis of lumbar region with radiculopathy    Hereditary peripheral neuropathy    Aortoiliac occlusive disease     Atherosclerosis of native artery of both lower extremities with intermittent claudication     Peripheral artery disease     Coronary artery disease involving native coronary artery of native heart    Chronic atrial fibrillation     History of transient alteration of awareness, resolved    Acute blood loss as cause of postoperative anemia    Anticoagulated on Coumadin    Ischemic cardiomyopathy    Chronic systolic heart failure     Carotid artery disease     Dyslipidemia    History of hyperuricemia    Euthyroid sick syndrome    Aftercare following surgery of the circulatory system    History of cardioversion    Chronic ulcers of right foot    Vitamin D deficiency       Plan:     1. Justification for continued stay: Good progression towards established rehabilitation goals.     2. Medical Issues being followed closely:    [x]  Fall and safety precautions     [x]  Wound Care     [x]  Bowel and Bladder Function     [x]  Fluid Electrolyte and Nutrition Balance     [x]  Pain Control      3. Issues that 24 hour rehabilitation nursing is following:    [x]  Fall and safety precautions     [x]  Wound Care     [x]  Bowel and Bladder Function     [x]  Fluid Electrolyte and Nutrition Balance     [x]  Pain Control      [x]  Assistance with and education on in-room safety with transfers to and from the bed, wheelchair, toilet and shower. 4. Acute rehabilitation plan of care:    [x]  Continue current care and rehab. [x]  Physical Therapy           [x]  Occupational Therapy           []  Speech Therapy     []  Hold Rehab until further notice     5. Medications:    [x]  MAR Reviewed     [x]  Continue Present Medications     6. DVT Prophylaxis:      []  Lovenox     []  Unfractionated Heparin     [x]  Coumadin     []  NOAC     [x]  VASQUEZ Stockings     [x]  Sequential Compression Device     []  None     7. Orders:   > S/P Right axillary to bifemoral artery bypass using an 8 mm Propaten graft from the right axilla to the right common femoral artery with a jump femoral-femoral bypass with another 8 mm Propaten external ring bypass; Right common femoral artery, and profunda femoris artery and superficial femoral artery endarterectomy causing an extensive surgery on the right side (4/4/2017 - Dr. Linda Hannah); S/P Cutdown of femoral-femoral bypass, more on the left groin side; Right lower extremity angiography with first-order catheterization (4/7/2017 - Dr. Linda Hannah); S/P Right femoral to above-knee popliteal bypass with an 8 mm PTFE graft (4/10/2017 - Dr. Jany Davis);  History of critical ischemia of the right lower limb secondary to peripheral arterial disease   > Continue:    > Aspirin 81 mg PO once daily with breakfast    > Atorvastatin 40 mg PO q HS    > Clopidogrel 75 mg PO once daily with dinner      > Acute Postoperative Blood Loss Anemia    > supplement Iron      > Benign hypertensive heart disease with chronic systolic heart failure   > On 4/25/2017:    > Changed Diltiazem  mg PO once daily (6AM) to Diltiazem 30 mg PO TID AC and qHS (7:30AM, 11:30AM, 4:30PM, 10PM)    > Decreased Metoprolol tartrate from 25 mg to 12.5 mg PO q 12 hr (9AM, 9PM)   > Continue:    > Diltiazem 30 mg PO TID AC and qHS (7:30AM, 11:30AM, 4:30PM, 10PM)    > Metoprolol tartrate 12.5 mg PO q 12 hr (9AM, 9PM)    > Losartan 25 mg PO once daily (change from 12PM to 9AM)      > Chronic atrial fibrillation, S/P Synchronized external cardioversion (4/18/2017 - Dr. Kurtis Bright), anticoagulated on Coumadin   > On 4/25/2017:    > Changed Diltiazem  mg PO once daily (6AM) to Diltiazem 30 mg PO TID AC and qHS (7:30AM, 11:30AM, 4:30PM, 10PM)    > Decreased Metoprolol tartrate from 25 mg to 12.5 mg PO q 12 hr (9AM, 9PM)   > Continue:    > Amiodarone     > Diltiazem 30 mg PO TID AC and qHS (7:30AM, 11:30AM, 4:30PM, 10PM)    > Metoprolol tartrate 12.5 mg PO q 12 hr (9AM, 9PM)   > Target INR = 2.5 to 3.5   > INR 2.3   > Coumadin 5 mg PO tonight      > Chronic ulcer of right foot   > Heel suspension boot on both feet when supine in bed   > Wound care recommendations as per Podiatry: Betadine dressings, non compressive, with 4x4s, conform once daily   > Patient was started on Ascorbic acid and Zinc sulfate   > Continue:    > Ascorbic acid 250 mg PO once daily    > Zinc sulfate 220 mg PO once daily       > Vitamin D Deficiency   supplement      > Analgesia   Percocet, neurontin    Home with [facility] tomorrow, d/w SEVERIANO Padgett d/w DR Bree Welch, d/w Dale Arteaga with Vascular    D/w Patient, specifically discussed dosing of amiodarone and coumadin      Signed:    Kelsey Jones MD      April 28, 2017

## 2017-04-28 NOTE — PROGRESS NOTES
SHIFT CHANGE NOTE FOR [facility]    Bedside and Verbal shift change report given to Ashok Rosales LPN (oncoming nurse) by Starla Lees RN   (offgoing nurse). Report included the following information SBAR, Kardex, MAR and Recent Results.     Situation:   Code Status: Full Code   Reason for Admission: [address] Day: 7   Problem List:   Hospital Problems  Date Reviewed: 4/26/2017          Codes Class Noted POA    Vitamin D deficiency (Chronic) ICD-10-CM: E55.9  ICD-9-CM: 268.9  4/22/2017 Yes    Overview Signed 4/22/2017 12:56 PM by Mallory Sheridan MD     Vitamin D 25-Hydroxy (4/22/2017) = 12.0             Anticoagulated on Coumadin (Chronic) ICD-10-CM: Z51.81, Z79.01  ICD-9-CM: V58.83, V58.61  Unknown Yes        Hyperuricemia (Chronic) ICD-10-CM: E79.0  ICD-9-CM: 790.6  Unknown Yes        Aftercare following surgery of the circulatory system ICD-10-CM: E48.601  ICD-9-CM: V58.73  4/21/2017 Yes    Overview Signed 4/21/2017  2:32 PM by Mallory Sheridan MD     S/P Right axillary to bifemoral artery bypass using an 8 mm Propaten graft from the right axilla to the right common femoral artery with a jump femoral-femoral bypass with another 8 mm Propaten external ring bypass; Right common femoral artery, and profunda femoris artery and superficial femoral artery endarterectomy causing an extensive surgery on the right side (4/4/2017 - Dr. Audrey Zaidi)    S/P Cutdown of femoral-femoral bypass, more on the left groin side; Right lower extremity angiography with first-order catheterization (4/7/2017 - Dr. Audrey Zaidi)    S/P Right femoral to above-knee popliteal bypass with an 8 mm PTFE graft (4/10/2017 - Dr. Juan Romero)             * (Principal)Status post femoral-popliteal bypass surgery ICD-10-CM: Z95.828  ICD-9-CM: V45.89  4/21/2017 Yes    Overview Signed 4/21/2017  2:33 PM by Mallory Sheridan MD     S/P Right axillary to bifemoral artery bypass using an 8 mm Propaten graft from the right axilla to the right common femoral artery with a jump femoral-femoral bypass with another 8 mm Propaten external ring bypass; Right common femoral artery, and profunda femoris artery and superficial femoral artery endarterectomy causing an extensive surgery on the right side (4/4/2017 - Dr. Esmer Acosta)    S/P Cutdown of femoral-femoral bypass, more on the left groin side; Right lower extremity angiography with first-order catheterization (4/7/2017 - Dr. Esmer Acosta)    S/P Right femoral to above-knee popliteal bypass with an 8 mm PTFE graft (4/10/2017 - Dr. Land)             History of cardioversion ICD-10-CM: Z98.890  ICD-9-CM: V15.1  4/18/2017 Yes    Overview Signed 4/21/2017  2:34 PM by Yvrose Huizar MD     S/P Synchronized external cardioversion (4/18/2017 - Dr. Shiraz Ramey)             Chronic atrial fibrillation ([facility] (Chronic) ICD-10-CM: J83.2  ICD-9-CM: 427.31  Unknown Yes        Critical ischemia of lower extremity ICD-10-CM: I99.8  ICD-9-CM: 459.9  4/10/2017 No    Overview Signed 4/21/2017  4:41 PM by Yvrose Huizar MD     Right lower limb             Euthyroid sick syndrome ICD-10-CM: E07.81  ICD-9-CM: 790.94  4/6/2017 Yes        Acute blood loss as cause of postoperative anemia ICD-10-CM: D62  ICD-9-CM: 285.1  4/4/2017 Yes        Impaired mobility and ADLs ICD-10-CM: Z74.09  ICD-9-CM: 799.89  4/4/2017 Yes        Chronic ulcer of right foot (HCC) (Chronic) ICD-10-CM: L97.519  ICD-9-CM: 707.15  4/4/2017 Yes        Aortoiliac occlusive disease ([facility] (Chronic) ICD-10-CM: K78.16  ICD-9-CM: 444.09  1/25/2017 Yes        Atherosclerosis of native artery of both lower extremities with intermittent claudication (HCC) (Chronic) ICD-10-CM: B51.600  ICD-9-CM: 440.21  1/25/2017 Yes        Peripheral artery disease (HCC) (Chronic) ICD-10-CM: I73.9  ICD-9-CM: 443.9  1/25/2017 Yes        Benign hypertensive heart disease with systolic congestive heart failure, NYHA class 2 (HCC) (Chronic) ICD-10-CM: I11.0, I50.20  ICD-9-CM: 402.11, 428.20, 428.0  1/26/2015 Yes              Background:   Past Medical History:   Past Medical History:   Diagnosis Date    Acute blood loss as cause of postoperative anemia 4/4/2017    Anticoagulated on Coumadin     [facility] occlusive disease ([facility] 1/25/2017    Atherosclerosis of native artery of both lower extremities with intermittent claudication ([facility] 1/25/2017    Benign hypertensive heart disease with systolic congestive heart failure, NYHA class 2 ([facility] 1/26/2015    Carotid artery disease (HCC)     Chronic atrial fibrillation (HCC)     Chronic systolic heart failure (HCC)     Chronic ulcer of right foot ([facility] 4/4/2017    Coronary artery disease involving native coronary artery of native heart 3/15/2017    Successful stenting of Cx (Xience LESLEY) and RCA (Xience LESLEY) to 0% by Dr. Lizeth Pinto on 3/15/17.     DDD (degenerative disc disease), lumbar 1/26/2015    Dyslipidemia     Erectile dysfunction 7/5/2016    Euthyroid sick syndrome 4/6/2017    Hereditary peripheral neuropathy 11/15/2016    History of cardioversion 4/18/2017    S/P Synchronized external cardioversion (4/18/2017 - Dr. Shiraz Ramey)    Hyperuricemia     Ischemic cardiomyopathy     Peripheral artery disease ([facility] 1/25/2017    Spinal stenosis of lumbar region with radiculopathy 5/4/2015    Dr. Marie Gamino Vitamin D deficiency 4/22/2017    Vitamin D 25-Hydroxy (4/22/2017) = 12.0      Patient taking anticoagulants YES   Patient has a defibrillator: no     Assessment:   Changes in Assessment throughout shift: NONE                     Last Vitals:     Vitals:    04/27/17 0747 04/27/17 1632 04/27/17 1930 04/28/17 0603   BP: 118/68 108/68 124/67 120/60   Pulse: 71 79 75 63   Resp: 16 20 20    Temp: 97.4 °F (36.3 °C) 97.9 °F (36.6 °C) 97.9 °F (36.6 °C)    SpO2: 100% 100% 92%         PAIN    Pain Assessment    Pain Intensity 1: 8 (04/28/17 0544) Pain Intensity 1: 2 (12/29/14 1105)    Pain Location 1: Foot Pain Location 1: Abdomen    Pain Intervention(s) 1: Medication (see MAR) Pain Intervention(s) 1: Medication (see MAR)  Patient Stated Pain Goal: 0 Patient Stated Pain Goal: 0  o Intervention effective: YES   o Other actions taken for pain: MEDICATION     Skin Assessment  Skin color Skin Color: Appropriate for ethnicity  Condition/Temperature Skin Condition/Temp: Dry, Warm  Integrity Skin Integrity: Incision (comment)  Turgor Turgor: Non-tenting  Weekly Pressure Ulcer Documentation  Pressure  Injury Documentation: No Pressure Injury Noted-Pressure Ulcer Prevention Initiated  Wound Prevention & Protection Methods  Orientation of wound Orientation of Wound Prevention: Posterior  Location of Prevention Location of Wound Prevention: Heel, Sacrum/Coccyx  Dressing Present Dressing Present :  (heel)  Dressing Status Dressing Status: Intact  Wound Offloading Wound Offloading (Prevention Methods): Bed, pressure redistribution/air     INTAKE/OUPUT    Date 04/27/17 0700 - 04/28/17 0659 04/28/17 0700 - 04/29/17 0659   Shift 2108-5856 5692-3025 24 Hour Total 0855-2183 4201-5613 24 Hour Total   I  N  T  A  K  E   P.O. 716  716         P. O. 716  716       Shift Total 716  716      O  U  T  P  U  T   Urine  2100 2100         Urine Voided  2100 2100         Urine Occurrence(s) 4 x 2 x 6 x       Stool            Stool Occurrence(s) 1 x 1 x 2 x       Shift Total  2100 2100       -[phone] -[phone]      Weight (kg)             Recommendations:  1. Patient needs and requests: TOILETING    2. Diet: CARDIAC REG    3. Pending tests/procedures: LABS    4. Functional Level/Equipment: wheelchair    5. Estimated Discharge Date: TBD Posted on Whiteboard in Patients Room: no     [facility]    A safety check occurred in the patient's room between off going nurse and oncoming nurse listed above.     The safety check included the below items  Area Items   H  High Alert Medications - Verify all high alert medication drips (heparin, PCA, etc.)   E  Equipment - Suction is set up for ALL patients (with cailin)  - Red plugs utilized for all equipment (IV pumps, etc.)  - WOWs wiped down at end of shift.  - Room stocked with oxygen, suction, and other unit-specific supplies   A  Alarms - Bed alarm is set for fall risk patients  - Ensure chair alarm is in place and activated if patient is up in a chair   L  Lines - Check IV for any infiltration  - Jiang bag is empty if patient has a Jiang   - Tubing and IV bags are labeled   S  Safety   - Room is clean, patient is clean, and equipment is clean. - Hallways are clear from equipment besides carts. - Fall bracelet on for fall risk patients  - Ensure room is clear and free of clutter  - Suction is set up for ALL patients (with cailin)  - Hallways are clear from equipment besides carts.    - Isolation precautions followed, supplies available outside room, sign posted

## 2017-04-28 NOTE — PROGRESS NOTES
Problem: Mobility Impaired (Adult and Pediatric)  Goal: *Acute Goals and Plan of Care (Insert Text)  Physical Therapy Goals  Short Term Goals  Initiated 4/24/2017 and to be accomplished within 7 day(s) 5/1/17  1. Patient will move from supine to sit and sit to supine in bed with modified independence. 2. Patient will transfer from bed to chair and chair to bed with supervision/set-up using the least restrictive device. 3. Patient will perform sit to stand with supervision/set-up. 4. Patient will ambulate with supervision/set-up for 100 feet with the least restrictive device. 5. Patient will ascend/descend 4 stairs with one handrail(s) with supervision/set-up. Long Term Goals  Initiated 4/24/2017 and to be accomplished within 14 day(s) 5/8/17  1. Patient will move from supine to sit and sit to supine in bed with independence. 2. Patient will transfer from bed to chair and chair to bed with modified independence using the least restrictive device. 3. Patient will perform sit to stand with modified independence. 4. Patient will ambulate with modified independence for 150 feet with the least restrictive device. 5. Patient will ascend/descend 4 stairs with one handrail(s) with modified independence. 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 Patient also reports that the doctor will be visualizing his right foot/ankle this afternoon, patient reports that it is \"black and swollen. \"     Pain at start of tx: 6/10 in right heel; pain medication received prior to treatment per patient report  Pain at stop of tx:6/10 in right heel during weight bearing activity. Time In: 1030  Time Out: 1100  Patient Seen For: transfers, therapeutic exercise. Pain: 6/10 in right heel; tolerable at rest.      Patient identified with name and : Yes         Objective: FIM      GROSS ASSESSMENT Daily Assessment     AROM: Generally decreased, functional  PROM: Generally decreased, functional  Strength: Generally decreased, functional  Coordination: Within functional limits  Tone: Normal  Sensation: Intact          BED/MAT MOBILITY Daily Assessment     Rolling Right : 6 (Modified independent)  Rolling Left : 6 (Modified independent)  Supine to Sit : 6 (Modified independent)  Sit to Supine : 6 (Modified independent)     Decreased speed of movement; performs on mat table. TRANSFERS Daily Assessment     Sit to Stand Assistance: Supervision for safety; minimal verbal cues for forward gaze to promote increased thoracic extension. Patient consistently positions UE's on anterior thighs to promote increased bilateral lower extremity weight bearing. Stand to sit: supervision for safety; minimal verbal cues to keep heels on ground while sitting to promote increased dorsiflexion. Presents with difficulty maintaining slow speed when sitting on low surface. Transfer Type: Other  Other: stand step with rolling walker  Transfer Assistance : 5 (Supervision/setup) see sit<->Stand for details; minimal cues for increased left step to promote increased right ankle dorsiflexion; also minimal verbal cues for slowed left step to promote increased right weight bearing to promote equal weight bearing. GAIT Daily Assessment     Amount of Assistance: 5 (Supervision/setup) for safety;  minimal cues for increased left step to promote increased right ankle dorsiflexion; also minimal verbal cues for slowed left step to promote increased right weight bearing to promote equal weight bearing.  Moderate verbal and manual cues at lateral hips to decreased right hip hiking and circumduction. Minimal verbal cues to correct rolling walker shift to left. Distance (ft): 60 Feet (ft) x 2 trials   Assistive Device: Walker, rolling           BALANCE Daily Assessment     Sitting - Static: Good (unsupported) no sway, supervision. Sitting - Dynamic: Good (unsupported) no sway or loss of balanace with lower extremity exercises edge of mat table. Standing - Static: Fair - minimal sway; close supervision for safety. Standing - Dynamic : Impaired - single leg stance activity with contralateral toe tapping on 4 inch step; Contact guard for safety; left stance - minimal verbal cues for decreasing trunk sway and patient uses single UE support; right stance - similar cueing but patient reports increased pain; Patient completed 3 sets of 15 each leg. WHEELCHAIR MOBILITY Daily Assessment     Able to Propel (ft): 300 feet  Functional Level: 6  Curbs/Ramps Assist Required (FIM Score): 1 (Total assistance) - NT  Wheelchair Setup Assist Required : 6 (Modified independent)  Wheelchair Management: Manages left brake;Manages right brake; propels with bilateral UE's       LOWER EXTREMITY EXERCISES Daily Assessment     Extremity: Both  Exercise Type #1: Seated lower extremity strengthening - Hip flexion   Sets Performed: 3  Reps Performed: 10  Level of Assist: Supervision; 3lb ankle weight on left LE  Exercise Type #2: Seated lower extremity strengthening - Knee extension   Sets Performed: 3  Reps Performed: 10  Level of Assist: Supervision; 3lb ankle weight on LLE; minimal verbal cues for technique  Exercise Type #3: Supine lower extremity strengthening - straight leg raises  Sets Performed: 3  Reps Performed: 10  Level of Assist: Supervision; minimal verbal cues for slowing movement for increased muscle activation as well as verbal cues for technique.    Exercise Type #4: Standing lower extremity strengthening - Squats  Sets Performed: 2  Reps Performed: 10  Level of Assist: Close Supervision for safety; minimal verbal cues and demonstration for technique; minimal verbal cues to keeping UE's on thighs and keep heels on the ground. Exercise Type #5: Supine lower extremity strengthening - Bridges   Sets Performed: 3  Reps Performed: 10  Level of Assist: Supervision; minimal verbal cues for breathing and LE positioning               Assessment: Patient demonstrating progression with ambulation as evident by requiring supervision for safety and cueing rather than contact guard assistance. Patient continues to present with increased pain in right heel, decreased right lower extremity strength, and decreased bilateral ankle dorsiflexion but primarily on the right; which are each resulting in decreased quality and independence of gait, step negotiation, and transfers. Patient will continue to benefit from skilled PT. Plan of Care: Continue plan of care; progress AROM of bilateral dorsiflexors to promote improved quality of movement and safety with step negotiation; progress ambulation by repetition to assess and progress quality of gait.       Indiana Hernandez, SPT  4/28/2017

## 2017-04-28 NOTE — PROGRESS NOTES
[x] Psychology  [] Social Work [] Recreational Therapy    INTERVENTION  UNITS/TIME OF SERVICE   Assessment    Supportive Counseling April 27, 2017   Orientation    Discharge Planning    Resource Linkage              Progress/Current Status    Patient attended support group on ARU and actively participated in discussion about all aspects of rehabilitation effort, with emphasis on post hospital needs and planning for same. Patient is apparently feeling very satisfied with his integration intro treatment milieu and can already identify gains for himself. He feels secure that his brother, as well as other family members, will be available to assist him post ARU. Mood is stable and he is very engaged in all aspects of discussion about rehabilitation.     Mitra Kam, [facility] 4/28/2017 8:43 AM

## 2017-04-28 NOTE — HOME CARE
Received HH referral, [facility] will follow for SN,wound care,labs,INR checks,PT/OT; pt states he already has RW and BSC, pt states he will be staying with his brother Bradley Ritchie) ,address obtained, [facility] will follow for pending d/c tomorrow. REUBEN NAIR.

## 2017-04-28 NOTE — PROGRESS NOTES
Problem: Self Care Deficits Care Plan (Adult)  Goal: *Acute Goals and Plan of Care (Insert Text)  Long Term Goals (to be met upon discharge date) in order to increase pts functional independence and safety, and decrease burden of care:  1. Pt will perform grooming with independence while standing. 2. Pt will perform UB bathing with modified independence. 3. Pt will perform LB bathing with modified independence. 4. Pt will perform tub/shower transfer with modified independence. 5. Pt will perform UB dressing with independence. 6. Pt will perform LB dressing with modified independence. 7. Pt will perform toileting task with modified independence. 8. Pt will perform toilet transfer with modified independence. Short Term Weekly Goals for (4/22/2017 to 4/29/2017) in order to increase pts functional independence and safety, and decrease burden of care:  1. Pt will perform grooming with independence while standing. 2. Pt will perform UB bathing with modified independence. 3. Pt will perform LB bathing with modified independence. 4. Pt will perform tub/shower transfer with supervision. 5. Pt will perform UB dressing with independence. 6. Pt will perform LB dressing with supervision. 7. Pt will perform toileting task with supervision. 8. Pt will perform toilet transfer with supervision.    OCCUPATIONAL THERAPY DISCHARGE SUMMARY  Patient Name:Mj Lopez  Time Spent With Patient  Time In: 1100  Time Out: 1230     Pain at start of tx:6  Pain at stop of tx:6  Objective: Pt stood to perform B UE ex using 4 lb dowel all planes 10 reps each with S.  Pt obtained items from low surface with focus on body mechanics with S.  Tub bench transfer training performed with mod I.      Problem List:    Decreased strength B UE  [ ]     Decreased strength trunk/core  [ ]     Decreased AROM   [ ]     Decreased PROM  [ ]     Decreased balance sitting  [ ]     Decreased balance standing  [X]     Decreased endurance  [X] Pain  [X]        Functional Limitations:   Decreased independence with ADL  [X]     Decreased independence with functional transfers  [X]     Decreased independence with ambulation  [X]     Decreased independence with IADL  [X]        Outcome Measures:                  MMT Initial Assessment    Right Upper Extremity  Left Upper Extermity    UE AROM  WNL  WNL   Shoulder flexion       Shoulder extension       Shoulder ABDuction       Shoulder ADDUction       Elbow Flexion       Elbow Extension       Wrist Extension/Flexion                                     MMT Discharge Assessment    Right Upper Extremity  Left Upper Extermity    UE AROM WNL WNL   Shoulder flexion       Shoulder extension       Shoulder ABDuction       Shoulder ADDUction       Elbow Flexion       Elbow Extension       Wrist Extension/Flexion                    0/5       No palpable muscle contraction  1/5       Palpable muscle contraction, no joint movement  2-/5      Less than full range of motion in gravity eliminated position  2/5       Able to complete full range of motion in gravity eliminated position  2+/5     Able to initiate movement against gravity  3-/5      More than half but not full range of motion against gravity  3/5       Able to complete full range of motion against gravity  3+/5     Completes full range of motion against gravity with minimal resistance  4-/5      Completes full range of motion against gravity with minimal-moderate resistance  4/5       Completes full range of motion against gravity with moderate resistance  4+/5     Completes full range of motion against gravity with moderate-maximum resistance  5/5       Completes full range of motion against gravity with maximum resistance     Coordination: BUE intact  Sensation: BUE intact      FIM SCORES Initial Assessment Discharge Assessment   Eating 5 Feeding/Eating  Feeding/Eating Assistance: 7 (Independent)   Grooming 7 Grooming  Grooming Assistance : 7 (Independent)          Bathing 2 (Pt stood in shower at [facility]) Upper Body Bathing  Bathing Assistance, Upper: 7 (Independent)  Lower Body Bathing  Bathing Assistance, Lower : 5 (Supervision)   Upper Body Dressing 5 Upper Body Dressing   Dressing Assistance : 7 (Independent)   Lower Body Dressing 4 Lower Body Dressing   Dressing Assistance : 6 (Modified independent)      Toileting 4 [address] (FIM Score): 6 (Modified independent)   Bladder - level of assist NA NA   Bladder - accident frequency score NA NA   Bowel - level of assist NA NA   Bowel - accident frequency score NA NA   Tub/Shower Transfer 4 6   Toilet Transfer 4 Functional Transfers  Toilet Transfer : Stand pivot transfer with walker  Amount of Assistance Required: 6 (Modified independent)  Tub or Shower Type: Tub/Shower combination  Amount of Assistance Required: 6 (Modified independent)   Comprehension 5 5   Expression 4 4   Social Interaction 5 5   Problem Solving 4 4   Memory 4 4   Please see [facility] Interdisciplinary Eval: Coordination/Balance Section for details regarding FIM score description. OCCUPATIONAL THERAPY PLAN OF CARE     LTGs: Pt met all LTGs with the exception of LTG#3. Pt would benefit from continued skilled occupational therapy in order to improve self care and functional mobility within the home with use of least restrictive device. Interventions may include ADL training, NMRE, cognitive re-training,  range of motion (AROM, PROM B UE), motor function (B UE strengthening/coordination), activity tolerance (vitals, oxygen saturation levels),and functional transfer training. HEP handout:  DME     Pt to be discharged 4/29/17 with intermittent S provided by daughter and brother for community level IADLs . Therapy recommendations: HHOT  Equipment recommendations: Tub transfer bench      Please see IR;  Interdisciplinary Eval, Care Plan, and Patient Education for further information regarding occupational therapy discharge summary and plan of care.       Sylvia Stokes, [address] 4/28/2017

## 2017-04-28 NOTE — PROGRESS NOTES
Pt to go home from rehab tomorrow  Assessed wounds all of which are improving  Orders written for wound care instructions with home health  Will need follow up in our office in next 1-2 weeks and with podiatry as well

## 2017-04-28 NOTE — DISCHARGE INSTRUCTIONS
The following personal items are in your possession at time of discharge:    Dental Appliances: Lowers, Uppers  Visual Aid: None     Home Medications: Kept at bedside  Jewelry: Necklace  Clothing: Shirt, Footwear, Socks, Undergarments, Pants  Other Valuables: Cell Phone  Personal Items Sent to Safe: none          PATIENT INSTRUCTIONS:    After general anesthesia or intravenous sedation, for 24 hours or while taking prescription Narcotics:  · Limit your activities  · Do not drive and operate hazardous machinery  · Do not make important personal or business decisions  · Do  not drink alcoholic beverages  · If you have not urinated within 8 hours after discharge, please contact your surgeon on call. Report the following to your surgeon:  · Excessive pain, swelling, redness or odor of or around the surgical area  · Temperature over 100.5  · Nausea and vomiting lasting longer than 4 hours or if unable to take medications  · Any signs of decreased circulation or nerve impairment to extremity: change in color, persistent  numbness, tingling, coldness or increase pain  · Any questions              *  Please give a list of your current medications to your Primary Care Provider. *  Please update this list whenever your medications are discontinued, doses are      changed, or new medications (including over-the-counter products) are added. *  Please carry medication information at all times in case of emergency situations. These are general instructions for a healthy lifestyle:    No smoking/ No tobacco products/ Avoid exposure to second hand smoke    Surgeon General's Warning:  Quitting smoking now greatly reduces serious risk to your health.     Obesity, smoking, and sedentary lifestyle greatly increases your risk for illness    A healthy diet, regular physical exercise & weight monitoring are important for maintaining a healthy lifestyle    You may be retaining fluid if you have a history of heart failure or if you experience any of the following symptoms:  Weight gain of 3 pounds or more overnight or 5 pounds in a week, increased swelling in our hands or feet or shortness of breath while lying flat in bed. Please call your doctor as soon as you notice any of these symptoms; do not wait until your next office visit. Recognize signs and symptoms of STROKE:    F-face looks uneven    A-arms unable to move or move unevenly    S-speech slurred or non-existent    T-time-call 911 as soon as signs and symptoms begin-DO NOT go       Back to bed or wait to see if you get better-TIME IS BRAIN. Warning Signs of HEART ATTACK     Call 911 if you have these symptoms:   Chest discomfort. Most heart attacks involve discomfort in the center of the chest that lasts more than a few minutes, or that goes away and comes back. It can feel like uncomfortable pressure, squeezing, fullness, or pain.  Discomfort in other areas of the upper body. Symptoms can include pain or discomfort in one or both arms, the back, neck, jaw, or stomach.  Shortness of breath with or without chest discomfort.  Other signs may include breaking out in a cold sweat, nausea, or lightheadedness. Don't wait more than five minutes to call 911 - MINUTES MATTER! Fast action can save your life. Calling 911 is almost always the fastest way to get lifesaving treatment. Emergency Medical Services staff can begin treatment when they arrive -- up to an hour sooner than if someone gets to the hospital by car. The discharge information has been reviewed with the patient. The patient verbalized understanding. Discharge medications reviewed with the patient and appropriate educational materials and side effects teaching were provided. Patient armband removed and shredded    MyChart Activation    Thank you for requesting access to [employer]. Please follow the instructions below to securely access and download your online medical record.  [employer] allows you to send messages to your doctor, view your test results, renew your prescriptions, schedule appointments, and more. How Do I Sign Up? 1. In your internet browser, go to [email or web address]  2. Click on the First Time User? Click Here link in the Sign In box. You will be redirect to the New Member Sign Up page. 3. Enter your Adknowledge Access Code exactly as it appears below. You will not need to use this code after youve completed the sign-up process. If you do not sign up before the expiration date, you must request a new code. Silverpopt Access Code: Activation code not generated  Current [employer]knowledge Status: Patient Declined (This is the date your Silverpopt access code will )    4. Enter the last four digits of your Social Security Number (xxxx) and Date of Birth (mm/dd/yyyy) as indicated and click Submit. You will be taken to the next sign-up page. 5. Create a Adknowledge ID. This will be your Adknowledge login ID and cannot be changed, so think of one that is secure and easy to remember. 6. Create a Adknowledge password. You can change your password at any time. 7. Enter your Password Reset Question and Answer. This can be used at a later time if you forget your password. 8. Enter your e-mail address. You will receive e-mail notification when new information is available in [address] 9. Click Sign Up. You can now view and download portions of your medical record. 10. Click the Download Summary menu link to download a portable copy of your medical information. Additional Information    If you have questions, please visit the Frequently Asked Questions section of the Adknowledge website at [email or web address] Remember, Adknowledge is NOT to be used for urgent needs. For medical emergencies, dial 911.                 Diet- Cardiac,   Ensure Enlive po three times daily with meals  Activity - as tolerated  FALL PRECAUTIONS  ASPIRATION PRECAUTIONS  DECUBITUS PRECAUTIONS  Wound care as per vascular  Incentive Spirometry Q30 minutes when awake  PT, OT Evaluate and Treat  Check CBC, CMP, Mg in 3 days  Check PT, INR in 3 days  F/u with PCP in 1 week  F/u with vascular in 10 days  F/u with Cardiologist in 2 weeks

## 2017-04-28 NOTE — PROGRESS NOTES
Education information and teaching given to patient on the following coumadin,amiodarone,plavix and diltiazem.

## 2017-04-28 NOTE — PROGRESS NOTES
Rodman Goodell  PA in to assess incision and wound to right foot and leg. Drsg change completed. New order placed in chart. Post ob boot ordered for patient picked up and applied to right foot.

## 2017-04-29 VITALS
OXYGEN SATURATION: 98 % | TEMPERATURE: 97.1 F | SYSTOLIC BLOOD PRESSURE: 101 MMHG | DIASTOLIC BLOOD PRESSURE: 60 MMHG | HEART RATE: 65 BPM | RESPIRATION RATE: 18 BRPM

## 2017-04-29 PROCEDURE — 97530 THERAPEUTIC ACTIVITIES: CPT

## 2017-04-29 PROCEDURE — 97116 GAIT TRAINING THERAPY: CPT

## 2017-04-29 PROCEDURE — 74011250637 HC RX REV CODE- 250/637: Performed by: INTERNAL MEDICINE

## 2017-04-29 RX ADMIN — GABAPENTIN 100 MG: 100 CAPSULE ORAL at 09:00

## 2017-04-29 RX ADMIN — Medication 250 MG: at 10:13

## 2017-04-29 RX ADMIN — ZINC SULFATE 220 MG (50 MG) CAPSULE 220 MG: CAPSULE at 10:12

## 2017-04-29 RX ADMIN — OXYCODONE HYDROCHLORIDE AND ACETAMINOPHEN 1 TABLET: 10; 325 TABLET ORAL at 00:59

## 2017-04-29 RX ADMIN — DILTIAZEM HYDROCHLORIDE 30 MG: 30 TABLET, FILM COATED ORAL at 06:38

## 2017-04-29 RX ADMIN — Medication 5000 UNITS: at 10:12

## 2017-04-29 RX ADMIN — DULOXETINE HYDROCHLORIDE 60 MG: 60 CAPSULE, DELAYED RELEASE ORAL at 10:12

## 2017-04-29 RX ADMIN — LACTOBACILLUS TAB 2 TABLET: TAB at 10:12

## 2017-04-29 RX ADMIN — FERROUS SULFATE TAB 325 MG (65 MG ELEMENTAL FE) 325 MG: 325 (65 FE) TAB at 10:12

## 2017-04-29 RX ADMIN — AMIODARONE HYDROCHLORIDE 400 MG: 200 TABLET ORAL at 10:11

## 2017-04-29 RX ADMIN — DOCUSATE SODIUM 100 MG: 100 CAPSULE, LIQUID FILLED ORAL at 09:00

## 2017-04-29 RX ADMIN — ASPIRIN 81 MG CHEWABLE TABLET 81 MG: 81 TABLET CHEWABLE at 10:11

## 2017-04-29 RX ADMIN — OXYCODONE HYDROCHLORIDE AND ACETAMINOPHEN 1 TABLET: 10; 325 TABLET ORAL at 10:19

## 2017-04-29 RX ADMIN — OXYCODONE HYDROCHLORIDE AND ACETAMINOPHEN 1 TABLET: 10; 325 TABLET ORAL at 06:14

## 2017-04-29 NOTE — PROGRESS NOTES
Problem: Mobility Impaired (Adult and Pediatric)  Goal: *Acute Goals and Plan of Care (Insert Text)  Physical Therapy Goals  Short Term Goals  Initiated 2017 updated 17 for d/c  1. Patient will move from supine to sit and sit to supine in bed with modified independence. (MET)  2. Patient will transfer from bed to chair and chair to bed with supervision/set-up using the least restrictive device. (MET)  3. Patient will perform sit to stand with supervision/set-up. (MET)  4. Patient will ambulate with supervision/set-up for 100 feet with the least restrictive device. (MET)  5. Patient will ascend/descend 4 stairs with one handrail(s) with supervision/set-up. (6 steps with BHR and S)    Long Term Goals  Initiated 2017 and to be accomplished within 14 day(s) 17  1. Patient will move from supine to sit and sit to supine in bed with independence. 2. Patient will transfer from bed to chair and chair to bed with modified independence using the least restrictive device. 3. Patient will perform sit to stand with modified independence. 4. Patient will ambulate with modified independence for 150 feet with the least restrictive device. 5. Patient will ascend/descend 4 stairs with one handrail(s) with modified independence.    PHYSICAL THERAPY DISCHARGE SUMMARY  Patient Name:Mj Pineda   Precautions at discharge:  fall risk, surgical shoe on R foot     Pain at start of tx: no c/o  Pain at stop of tx: c/o burning in R heel during and after ambulation     Patient identified with name and : yes      Problem List:    Decreased strength B LE  [X]     Decreased strength trunk/core  [ ]     Decreased AROM   [X]     Decreased PROM  [X]     Decreased balance sitting  [ ]     Decreased balance standing  [X]     Decreased endurance  [X]     Pain  [X]        Functional Limitations:   Decreased independence with bed mobility  [ ]     Decreased independence with functional transfers  [X]     Decreased independence with ambulation  [X]     Decreased independence with stair negotiation  [X]                Outcome Measures: FIM and MMT                 MMT Initial Asssessment    Right Lower Extremity Left Lower Extremity   Hip Flexion 3+ 4   Knee Extension 4- 4   Knee Flexion 4- 4   Ankle Dorsiflexion 3 4                  MMT Discharge Assessment: not formally accessed, based on functional mobility    Right Lower Extremity Left Lower Extremity   Hip Flexion  4-  4+   Knee Extension  4  4+   Knee Flexion  4  4+   Ankle Dorsiflexion  3  4+   0/5       No palpable muscle contraction  1/5       Palpable muscle contraction, no joint movement  2-/5      Less than full range of motion in gravity eliminated position  2/5       Able to complete full range of motion in gravity eliminated position  2+/5     Able to initiate movement against gravity  3-/5      More than half but not full range of motion against gravity  3/5       Able to complete full range of motion against gravity  3+/5     Completes full range of motion against gravity with minimal resistance  4-/5      Completes full range of motion against gravity with minimal-moderate resistance  4/5       Completes full range of motion against gravity with moderate resistance  4+/5     Completes full range of motion against gravity with moderate-maximum resistance  5/5       Completes full range of motion against gravity with maximum resistance                 AROM: generally decreased primarily at R foot      FIM SCORES Initial Assessment Discharge Assessment   Bed/Chair/Wheelchair Transfers 4 5   Wheelchair Mobility 6 6   Walking [place] 1 5   Steps/Stairs 2 2   PRIMARY MODE OF LOCOMOTION: ambulation  Please see IRC Interdisciplinary Eval: Coordination/Balance Section for details regarding FIM score description.       BED/CHAIR/WHEELCHAIR TRANSFERS Initial Assessment Discharge Assessment   Rolling Right 5 (Supervision) 7 (Independent)   Rolling Left 5 (Supervision) 7 (Independent)   Supine to Sit 5 (Supervision) 6 (Modified independent)   Sit to Stand Contact guard assistance Supervision   Sit to Supine 5 (Supervision) 6 (Modified independent)   Transfer Assist Score 4 5   Transfer Type Other Other   Comments contact guard  S for safety   Car Transfer Supervision Supervision   Car Type jeep jekrupa          WHEELCHAIR MOBILITY/MANAGEMENT Initial Assessment Discharge Assessment   Able to [employer] 300 feet 220 feet   Functional Level 6 6   Curbs/ramps assistance required 1 (Total assistance) 0 (Not tested)   Wheelchair set up assistance required 6 (Modified independent) 5 (Supervision/setup)   Wheelchair management Manages left brake, Manages right brake Manages left brake;Manages right brake;Manages left footrest;Manages right footrest          WALKING INDEPENDENCE Initial Assessment Discharge Assessment   Assistive device Walker, rolling Walker, rolling   Ambulation assistance - level surface  4(CGA)  5(S)   Distance 25 Feet (ft) 150 Feet (ft) (x2 trials)   Functional Level 1 5   Comments   For safety; moderate verbal cues for keeping walker close, increased left step to promote equal step length and increased right LE weight bearing. Patient presents with decreased velocity, antalgic gait, decreased right step length, decreased hip and knee extension on right side  . Ambulation assistance - unlevel surface    S/SBA          STEPS/STAIRS Initial Assessment Discharge Assessment   Steps/Stairs ambulated 4 6 (6\" steps)   Rail Use Both Both   Functional Level 2 2   Comments     pt performed stair negotiation with S and step to pattern leading with LLE to ascend and RLE to descend   Curbs/Ramps    NT      Intervention 4/29/17: Weekly assessment performed for d/c requiring patient to demonstrate all aspects of functional mobility at highest level of function. See above for current status.  Pt participated in family training to include ambulation with RW inside and outside with S/dist S, stair training, bed mobility and car txfr. PHYSICAL THERAPY PLAN OF CARE     LTGs: Pt met all STGs except to use only 1 HR with stair negotiation. Pt benefits from S for safety due to decreased balance from pain in R foot. Pt would benefit from home health physical therapy in order to improve independent functional mobility within the home with use of least restrictive device. Interventions may include range of motion (AROM, PROM B LE/trunk), motor function (B LE/trunk strengthening/coordination), activity tolerance (vitals, oxygen saturation levels), bed mobility training, balance activities, gait training (progressive ambulation program), and functional transfer training. Pt to be discharged 4/29/17. Family training: Has taken place  Therapy Recommendations upon discharge:   home health PT  Equipment needs at discharge:     RW     Please see IRC; Interdisciplinary Eval, Care Plan, and Patient Education for further information regarding physical therapy discharge summary and plan of care.       Wing Eisenmenger, PTA  4/29/2017

## 2017-04-29 NOTE — PROGRESS NOTES
SHIFT CHANGE NOTE FOR [facility]    Bedside and Verbal shift change report given to JILLIAN ALEXIS (oncoming nurse) by Osmani Marinelli LPN   (offgoing nurse). Report included the following information SBAR, Kardex, MAR and Recent Results.     Situation:   Code Status: Full Code   Reason for Admission: [address] Day: 7   Problem List:   Hospital Problems  Date Reviewed: 4/26/2017          Codes Class Noted POA    Vitamin D deficiency (Chronic) ICD-10-CM: E55.9  ICD-9-CM: 268.9  4/22/2017 Yes    Overview Signed 4/22/2017 12:56 PM by Joyce Lagunas MD     Vitamin D 25-Hydroxy (4/22/2017) = 12.0             Anticoagulated on Coumadin (Chronic) ICD-10-CM: Z51.81, Z79.01  ICD-9-CM: V58.83, V58.61  Unknown Yes        Hyperuricemia (Chronic) ICD-10-CM: E79.0  ICD-9-CM: 790.6  Unknown Yes        Aftercare following surgery of the circulatory system ICD-10-CM: H30.135  ICD-9-CM: V58.73  4/21/2017 Yes    Overview Signed 4/21/2017  2:32 PM by Joyce Lagunas MD     S/P Right axillary to bifemoral artery bypass using an 8 mm Propaten graft from the right axilla to the right common femoral artery with a jump femoral-femoral bypass with another 8 mm Propaten external ring bypass; Right common femoral artery, and profunda femoris artery and superficial femoral artery endarterectomy causing an extensive surgery on the right side (4/4/2017 - Dr. Ana M Suazo)    S/P Cutdown of femoral-femoral bypass, more on the left groin side; Right lower extremity angiography with first-order catheterization (4/7/2017 - Dr. Ana M Suazo)    S/P Right femoral to above-knee popliteal bypass with an 8 mm PTFE graft (4/10/2017 - Dr. Shelvy Angelucci)             * (Principal)Status post femoral-popliteal bypass surgery ICD-10-CM: Z95.828  ICD-9-CM: V45.89  4/21/2017 Yes    Overview Signed 4/21/2017  2:33 PM by Joyce Lagunas MD     S/P Right axillary to bifemoral artery bypass using an 8 mm Propaten graft from the right axilla to the right common femoral artery with a jump femoral-femoral bypass with another 8 mm Propaten external ring bypass; Right common femoral artery, and profunda femoris artery and superficial femoral artery endarterectomy causing an extensive surgery on the right side (4/4/2017 - Dr. Khang Oneal)    S/P Cutdown of femoral-femoral bypass, more on the left groin side; Right lower extremity angiography with first-order catheterization (4/7/2017 - Dr. Khang Oneal)    S/P Right femoral to above-knee popliteal bypass with an 8 mm PTFE graft (4/10/2017 - Dr. Geneva Rodas)             History of cardioversion ICD-10-CM: Z98.890  ICD-9-CM: V15.1  4/18/2017 Yes    Overview Signed 4/21/2017  2:34 PM by Bianca Hammond MD     S/P Synchronized external cardioversion (4/18/2017 - Dr. Margot Crowe)             Chronic atrial fibrillation ([facility] (Chronic) ICD-10-CM: Z65.3  ICD-9-CM: 427.31  Unknown Yes        Critical ischemia of lower extremity ICD-10-CM: I99.8  ICD-9-CM: 459.9  4/10/2017 No    Overview Signed 4/21/2017  4:41 PM by Bianca Hammond MD     Right lower limb             Euthyroid sick syndrome ICD-10-CM: E07.81  ICD-9-CM: 790.94  4/6/2017 Yes        Acute blood loss as cause of postoperative anemia ICD-10-CM: D62  ICD-9-CM: 285.1  4/4/2017 Yes        Impaired mobility and ADLs ICD-10-CM: Z74.09  ICD-9-CM: 799.89  4/4/2017 Yes        Chronic ulcer of right foot (HCC) (Chronic) ICD-10-CM: L97.519  ICD-9-CM: 707.15  4/4/2017 Yes        Aortoiliac occlusive disease ([facility] (Chronic) ICD-10-CM: T36.77  ICD-9-CM: 444.09  1/25/2017 Yes        Atherosclerosis of native artery of both lower extremities with intermittent claudication (HCC) (Chronic) ICD-10-CM: P44.128  ICD-9-CM: 440.21  1/25/2017 Yes        Peripheral artery disease (HCC) (Chronic) ICD-10-CM: I73.9  ICD-9-CM: 443.9  1/25/2017 Yes        Benign hypertensive heart disease with systolic congestive heart failure, NYHA class 2 (HCC) (Chronic) ICD-10-CM: I11.0, I50.20  ICD-9-CM: 402.11, 428.20, 428.0  1/26/2015 Yes              Background:   Past Medical History:   Past Medical History:   Diagnosis Date    Acute blood loss as cause of postoperative anemia 4/4/2017    Anticoagulated on Coumadin     [facility] occlusive [facility] 1/25/2017    Atherosclerosis of native artery of both lower extremities with intermittent claudication ([facility] 1/25/2017    Benign hypertensive heart disease with systolic congestive heart failure, NYHA class 2 ([facility] 1/26/2015    Carotid artery disease (HCC)     Chronic atrial fibrillation (HCC)     Chronic systolic heart failure (HCC)     Chronic ulcer of right foot ([facility] 4/4/2017    Coronary artery disease involving native coronary artery of native heart 3/15/2017    Successful stenting of Cx (Xience LESLEY) and RCA (Xience LESLEY) to 0% by Dr. Nirali Eckert on 3/15/17.     DDD (degenerative disc disease), lumbar 1/26/2015    Dyslipidemia     Erectile dysfunction 7/5/2016    Euthyroid sick syndrome 4/6/2017    Hereditary peripheral neuropathy 11/15/2016    History of cardioversion 4/18/2017    S/P Synchronized external cardioversion (4/18/2017 - Dr. Александр Moreno)    Hyperuricemia     Ischemic cardiomyopathy     Peripheral artery disease ([facility] 1/25/2017    Spinal stenosis of lumbar region with radiculopathy 5/4/2015    Dr. Rose Lancaster Vitamin D deficiency 4/22/2017    Vitamin D 25-Hydroxy (4/22/2017) = 12.0      Patient taking anticoagulants YES   Patient has a defibrillator: no     Assessment:   Changes in Assessment throughout shift: NONE                     Last Vitals:     Vitals:    04/28/17 0603 04/28/17 0744 04/28/17 1614 04/28/17 1930   BP: 120/60 118/62 119/67 128/67   Pulse: 63 65 69 78   Resp:  18 17 19   Temp:  97.1 °F (36.2 °C) 97 °F (36.1 °C) 97.2 °F (36.2 °C)   SpO2:  96% 100% 98%        PAIN    Pain Assessment    Pain Intensity 1: 0 (04/28/17 2200) Pain Intensity 1: 2 (12/29/14 1105)    Pain Location 1: Foot Pain Location 1: Abdomen    Pain Intervention(s) 1: Medication (see MAR) Pain Intervention(s) 1: Medication (see MAR)  Patient Stated Pain Goal: 0 Patient Stated Pain Goal: 0  o Intervention effective: YES   o Other actions taken for pain: MEDICATION     Skin Assessment  Skin color Skin Color: Appropriate for ethnicity  Condition/Temperature Skin Condition/Temp: Dry, Warm  Integrity Skin Integrity: Incision (comment)  Turgor Turgor: Non-tenting  Weekly Pressure Ulcer Documentation  Pressure  Injury Documentation: No Pressure Injury Noted-Pressure Ulcer Prevention Initiated  Wound Prevention & Protection Methods  Orientation of wound Orientation of Wound Prevention: Posterior  Location of Prevention Location of Wound Prevention: Buttocks, Heel, Sacrum/Coccyx  Dressing Present Dressing Present :  (heel)  Dressing Status Dressing Status: Intact  Wound Offloading Wound Offloading (Prevention Methods): Bed, pressure redistribution/air     INTAKE/OUPUT    Date 04/27/17 1900 - 04/28/17 0659 04/28/17 0700 - 04/29/17 0659   Shift 7659-5330 24 Hour Total 2514-3213 4368-9636 24 Hour Total   I  N  T  A  K  E   P.O.  716 840  840      P. O.  716 840  840    Shift Total  716 840  840   O  U  T  P  U  T   Urine 2100 2100         Urine Voided 2100 2100         Urine Occurrence(s) 2 x 6 x 1 x 0 x 1 x    Stool           Stool Occurrence(s) 1 x 2 x 0 x 0 x 0 x    Shift Total 2100 2100      NET -2100 -1384 840  840   Weight (kg)            Recommendations:  1. Patient needs and requests: TOILETING    2. Diet: CARDIAC REG    3. Pending tests/procedures: LABS    4. Functional Level/Equipment: wheelchair    5. Estimated Discharge Date: TBD Posted on Whiteboard in Patients Room: no     HEALS Safety Check    A safety check occurred in the patient's room between off going nurse and oncoming nurse listed above.     The safety check included the below items  Area Items   H  High Alert Medications - Verify all high alert medication drips (heparin, PCA, etc.)   E  Equipment - Suction is set up for ALL patients (with cailin)  - Red plugs utilized for all equipment (IV pumps, etc.)  - WOWs wiped down at end of shift.  - Room stocked with oxygen, suction, and other unit-specific supplies   A  Alarms - Bed alarm is set for fall risk patients  - Ensure chair alarm is in place and activated if patient is up in a chair   L  Lines - Check IV for any infiltration  - Jiang bag is empty if patient has a Jiang   - Tubing and IV bags are labeled   S  Safety   - Room is clean, patient is clean, and equipment is clean. - Hallways are clear from equipment besides carts. - Fall bracelet on for fall risk patients  - Ensure room is clear and free of clutter  - Suction is set up for ALL patients (with cailin)  - Hallways are clear from equipment besides carts.    - Isolation precautions followed, supplies available outside room, sign posted

## 2017-04-29 NOTE — PROGRESS NOTES
Patient discharge instructions reviewed. Patient taken out by nurse no distress noted.  Patient left with family

## 2017-05-01 ENCOUNTER — HOME CARE VISIT (OUTPATIENT)
Dept: SCHEDULING | Facility: HOME HEALTH | Age: 57
End: 2017-05-01
Payer: COMMERCIAL

## 2017-05-01 ENCOUNTER — TELEPHONE (OUTPATIENT)
Dept: VASCULAR SURGERY | Age: 57
End: 2017-05-01

## 2017-05-01 ENCOUNTER — TELEPHONE ANTICOAG (OUTPATIENT)
Dept: CARDIOLOGY CLINIC | Age: 57
End: 2017-05-01

## 2017-05-01 ENCOUNTER — PATIENT OUTREACH (OUTPATIENT)
Dept: FAMILY MEDICINE CLINIC | Age: 57
End: 2017-05-01

## 2017-05-01 ENCOUNTER — HOME CARE VISIT (OUTPATIENT)
Dept: HOME HEALTH SERVICES | Facility: HOME HEALTH | Age: 57
End: 2017-05-01

## 2017-05-01 VITALS
HEART RATE: 70 BPM | DIASTOLIC BLOOD PRESSURE: 50 MMHG | TEMPERATURE: 98.3 F | RESPIRATION RATE: 18 BRPM | SYSTOLIC BLOOD PRESSURE: 110 MMHG | OXYGEN SATURATION: 99 %

## 2017-05-01 DIAGNOSIS — Z79.01 ANTICOAGULATED ON COUMADIN: ICD-10-CM

## 2017-05-01 DIAGNOSIS — I48.20 CHRONIC ATRIAL FIBRILLATION (HCC): ICD-10-CM

## 2017-05-01 LAB
INR BLD: 2.5 (ref 0.9–1.1)
INR, EXTERNAL: 2.5
PT POC: 30.2 SEC

## 2017-05-01 PROCEDURE — A6446 CONFORM BAND S W>=3" <5"/YD: HCPCS

## 2017-05-01 PROCEDURE — A6248 HYDROGEL DRSG GEL FILLER: HCPCS

## 2017-05-01 PROCEDURE — A6212 FOAM DRG <=16 SQ IN W/BORDER: HCPCS

## 2017-05-01 PROCEDURE — G0299 HHS/HOSPICE OF RN EA 15 MIN: HCPCS

## 2017-05-01 PROCEDURE — 400013 HH SOC

## 2017-05-01 NOTE — HOME CARE
Discharge noted over the weekend, [facility] will follow , referral processed by [facility] central intake over the weekend. REUBEN NAIR.

## 2017-05-01 NOTE — PROGRESS NOTES
Verbal order and read back per Laurita Cárdenas NP  The INR is stable and therapeutic.  Continue same dose of coumadin and recheck in 1 week  Continue Coumadin 2.5 mg daily  Emiliano Banks informed of instructions, read back and verbalized understanding Chandrakant Velasco LPN

## 2017-05-01 NOTE — PROGRESS NOTES
Nurse Navigator attempted to contact patient post discharge from [facility] ; 4/4/17 to 4/29/17 for c/o intermittent Claudication. Message left introducing myself, the purpose of the call and giving my contact information.   Requested that patient call back at his earliest convenience

## 2017-05-02 ENCOUNTER — PATIENT OUTREACH (OUTPATIENT)
Dept: FAMILY MEDICINE CLINIC | Age: 57
End: 2017-05-02

## 2017-05-02 ENCOUNTER — HOME CARE VISIT (OUTPATIENT)
Dept: SCHEDULING | Facility: HOME HEALTH | Age: 57
End: 2017-05-02
Payer: COMMERCIAL

## 2017-05-02 ENCOUNTER — TELEPHONE (OUTPATIENT)
Dept: CARDIOLOGY CLINIC | Age: 57
End: 2017-05-02

## 2017-05-02 ENCOUNTER — HOME CARE VISIT (OUTPATIENT)
Dept: HOME HEALTH SERVICES | Facility: HOME HEALTH | Age: 57
End: 2017-05-02
Payer: COMMERCIAL

## 2017-05-02 VITALS — HEART RATE: 72 BPM | OXYGEN SATURATION: 97 % | DIASTOLIC BLOOD PRESSURE: 72 MMHG | SYSTOLIC BLOOD PRESSURE: 124 MMHG

## 2017-05-02 PROCEDURE — G0299 HHS/HOSPICE OF RN EA 15 MIN: HCPCS

## 2017-05-02 PROCEDURE — G0152 HHCP-SERV OF OT,EA 15 MIN: HCPCS

## 2017-05-02 RX ORDER — LOSARTAN POTASSIUM 25 MG/1
25 TABLET ORAL DAILY
Qty: 30 TAB | Refills: 6 | Status: SHIPPED | OUTPATIENT
Start: 2017-05-02 | End: 2017-06-02 | Stop reason: SDUPTHER

## 2017-05-02 NOTE — PROGRESS NOTES
NNTOCIP  Patient admitted to [facility] on 4/4/2017 to 4/21/2017 for Atherosclerosis of native artery of both lower extremities with intermittent claudication [facility] . Patient admitted to [facility] AR[facility] on 4/21/2017 to 4/29/2017 for Status post femoral-popliteal bypass surgery . Course of hospitalization:  admitted after right axillary to fem-fem bypass. Overall doing well after surgery but then had continued right leg pain. Had angiogram with occluded SFA attempted stenting but unable to so underwent right fem-pop bypass with rapid improvement. Had issues of afib and treated by cardiology eventually with cardioversion. Had some issues of mental status changes ultimately no known cause some thoughts were DTs and ICU psychosis. In either event that also improved. Ready for transfer to rehab. Pertinent labs:  Results for Federica Cortes (MRN [id number]) as of 5/2/2017 11:56   Ref. Range 4/26/2017 06:20 4/27/2017 06:19 4/28/2017 06:15 5/1/2017 00:00 5/1/2017 11:30   INR Latest Ref Range: 0.9 - 1.1  2.2 (H) 2.4 (H) 2.3 (H)  2.5 (A)   Prothrombin time Latest Ref Range: 11.5 - 15.2 sec 23.6 (H) 25.2 (H) 24.3 (H)     Prothrombin time (POC) Latest Units: sec     30.2   INR, External Unknown    2.5      Inpatient Consults:  Cardiology, Nephrology and Hospitalist  Discharge diagnoses:       Atherosclerosis of native artery of both lower extremities with intermittent claudication ([facility]   RRAT Score: 10  CCI: [address]  [address] [place] [address] admissions in past year: 2  ED admissions in past year: 0    Contacted patient for hospital follow up. Introduced self, role and reason for call. Verified 2 patient identifiers. Patient reports:  Staying with his brother. Getting around pretty good and he is ready to get home and back to work. Patient stated he had a nose bleed Sunday that was hard to get to stop. When asked if had had labs drawn yesterday he said the girl could not get the vein but they came out a little while ago and caryn the blood.     Patient denies:  Further nose bleeds. ADL's:  Preforms all ADL's  by self with out difficulty     DME:   walker  Resources/Support:   Patient has adequate support at home from family and has no detectable resource needs at this time. AMD:   Not on file  Reconciled home medications and reviewed allergies. Instructed to bring all medications with him to next appointment. Educated patient to monitor and report the following Red flags: bruising, bleeding gums, bloody stool or any new or concerning symptoms. Patient verbalized understanding of information discussed and is aware of  when to seek medical attention from PCP, urgent care or ED. Opportunity to ask questions was provided. Contact information was provided for future reference or further questions. Appointments:  Dr. Jered Stover 5/5/2017 at [address]  Dr. Shelia Vasquez 5/5/2017 at [phone] with the nurse  Dr. Hugo Torres 5/17/2017 at [phone]  Patient aware of appointments. His daughter will provide transportation until he is released to drive himself. Potential Barriers to care  No apparent barriers to care identified at this time. Adherence to previous treatment and likelihood for follow-up:  Patient verbalized understanding of discharge instructions and need for follow up. Plan of Care/Goals:  Goals        Patient Stated     Returns to baseline activity level. (pt-stated)            Patient will be able to return home to live independently      [place] to baseline activity level. (pt-stated)            Return back to work soon         Other     Attends follow-up appointments as directed. Patient will keep all scheduled appointments          This represents Transitions of Care. Nurse Navigator spoke with patient within 1-2 business day(s) of discharge. Patient's transition of care follow up appointment is scheduled with Jered Stover on 5/5/2017 at 0945 which is within 7-14 days of discharge.

## 2017-05-02 NOTE — TELEPHONE ENCOUNTER
Pt daughter called state pt was discharged after procedure with Dr Jeyson Strauss on Saturday and woke up with a nose bleed on Sunday. Pt was instructed to hold plavix and go to the ED also instructed to call our office for further instructions. Pt was recently stented in March with 2 stents. Also had cardioversion and Fem POP in April. On Coumadin for that. Verbal order and read back per Caroline Bardales, DO  Pt needs to back on plavix and cant hold this medications due to recent stents for at least 9 months. Pt can hold coumadin till nose bleed has resolved and go back on the coumadin.   If the nose bleed happens again pt is instructed to call the office for further instructions and will probably need a referral to ENT

## 2017-05-03 ENCOUNTER — PATIENT OUTREACH (OUTPATIENT)
Dept: FAMILY MEDICINE CLINIC | Age: 57
End: 2017-05-03

## 2017-05-03 ENCOUNTER — TELEPHONE (OUTPATIENT)
Dept: VASCULAR SURGERY | Age: 57
End: 2017-05-03

## 2017-05-03 ENCOUNTER — HOME CARE VISIT (OUTPATIENT)
Dept: HOME HEALTH SERVICES | Facility: HOME HEALTH | Age: 57
End: 2017-05-03
Payer: COMMERCIAL

## 2017-05-03 VITALS
DIASTOLIC BLOOD PRESSURE: 70 MMHG | RESPIRATION RATE: 18 BRPM | SYSTOLIC BLOOD PRESSURE: 118 MMHG | TEMPERATURE: 98.3 F | OXYGEN SATURATION: 99 % | HEART RATE: 73 BPM

## 2017-05-03 NOTE — TELEPHONE ENCOUNTER
Daughter Leanna Johnson would like for someone to please call her concerning her father's medication list. She is confused as to what and when to give her father. He was discharged Saturday.

## 2017-05-03 NOTE — PROGRESS NOTES
Dary called and left a message for pt's  to advise that pt was dc to home on Saturday, 4/29 with home health. No further needs are noted at this time.

## 2017-05-04 ENCOUNTER — HOME CARE VISIT (OUTPATIENT)
Dept: SCHEDULING | Facility: HOME HEALTH | Age: 57
End: 2017-05-04
Payer: COMMERCIAL

## 2017-05-04 ENCOUNTER — PATIENT OUTREACH (OUTPATIENT)
Dept: FAMILY MEDICINE CLINIC | Age: 57
End: 2017-05-04

## 2017-05-04 ENCOUNTER — HOME CARE VISIT (OUTPATIENT)
Dept: HOME HEALTH SERVICES | Facility: HOME HEALTH | Age: 57
End: 2017-05-04
Payer: COMMERCIAL

## 2017-05-04 VITALS — DIASTOLIC BLOOD PRESSURE: 60 MMHG | SYSTOLIC BLOOD PRESSURE: 110 MMHG | OXYGEN SATURATION: 97 % | HEART RATE: 85 BPM

## 2017-05-04 PROCEDURE — G0151 HHCP-SERV OF PT,EA 15 MIN: HCPCS

## 2017-05-04 PROCEDURE — G0152 HHCP-SERV OF OT,EA 15 MIN: HCPCS

## 2017-05-04 RX ORDER — LISINOPRIL 40 MG/1
40 TABLET ORAL DAILY
COMMUNITY
End: 2017-05-05

## 2017-05-04 NOTE — PROGRESS NOTES
Spoke with patient today explained to him that I had heard there was some confusion and concern about his medications and I was calling to see if I could get my records straight and understand what was going on. Patient told me I needed to talk to his sister n law who took care of his medications I asked and received verbal permission to speak to her. While speaking to Nanette I also found out that the patient had been taken by his family to [facility] for his nose bleed on Sunday. Previously the patient had told me he had a nose bleed but not that he had gone to the hospital.  Looking at [facility] records patient was in ED from 1200 to 1709 to get nose bleed to stop which coincides with sister in [address] report. Sister darryl also stated that the patient did not receive his discharge prescriptions until yesterday from the ARU. This caused him to miss several medications most significantly the blood pressure medications lisinopril and losartan. The losartan was started yesterday at 1700 and the plan is for the sister in law to give the medication at 1400 today and then at about 1000 tomorrow so that by Saturday he is taking it with all of his other AM medications. Detailed med rec done today on the phone. NN will provide patient with med boxes at [address] appointment. This was discussed with shira and it was decided they just need 2.

## 2017-05-05 ENCOUNTER — OFFICE VISIT (OUTPATIENT)
Dept: FAMILY MEDICINE CLINIC | Age: 57
End: 2017-05-05

## 2017-05-05 ENCOUNTER — HOSPITAL ENCOUNTER (OUTPATIENT)
Dept: LAB | Age: 57
Discharge: HOME | End: 2017-05-05

## 2017-05-05 ENCOUNTER — OFFICE VISIT (OUTPATIENT)
Dept: VASCULAR SURGERY | Age: 57
End: 2017-05-05

## 2017-05-05 VITALS
TEMPERATURE: 97.8 F | HEIGHT: 70 IN | BODY MASS INDEX: 23.91 KG/M2 | WEIGHT: 167 LBS | HEART RATE: 68 BPM | OXYGEN SATURATION: 95 % | SYSTOLIC BLOOD PRESSURE: 118 MMHG | RESPIRATION RATE: 16 BRPM | DIASTOLIC BLOOD PRESSURE: 80 MMHG

## 2017-05-05 VITALS — SYSTOLIC BLOOD PRESSURE: 122 MMHG | HEART RATE: 62 BPM | DIASTOLIC BLOOD PRESSURE: 78 MMHG

## 2017-05-05 VITALS — WEIGHT: 167 LBS | HEIGHT: 70 IN | BODY MASS INDEX: 23.91 KG/M2

## 2017-05-05 DIAGNOSIS — L97.511: Primary | Chronic | ICD-10-CM

## 2017-05-05 DIAGNOSIS — E55.9 VITAMIN D DEFICIENCY: Chronic | ICD-10-CM

## 2017-05-05 DIAGNOSIS — E03.9 HYPOTHYROIDISM, UNSPECIFIED TYPE: ICD-10-CM

## 2017-05-05 DIAGNOSIS — I48.20 CHRONIC ATRIAL FIBRILLATION (HCC): Chronic | ICD-10-CM

## 2017-05-05 DIAGNOSIS — I11.0 BENIGN HYPERTENSIVE HEART DISEASE WITH SYSTOLIC CONGESTIVE HEART FAILURE, NYHA CLASS 2 (HCC): Chronic | ICD-10-CM

## 2017-05-05 DIAGNOSIS — Z98.890 HISTORY OF CARDIOVERSION: ICD-10-CM

## 2017-05-05 DIAGNOSIS — Z95.828 STATUS POST FEMORAL-POPLITEAL BYPASS SURGERY: ICD-10-CM

## 2017-05-05 DIAGNOSIS — Z79.01 ANTICOAGULATED ON COUMADIN: Chronic | ICD-10-CM

## 2017-05-05 DIAGNOSIS — D64.9 POSTOPERATIVE ANEMIA: Primary | ICD-10-CM

## 2017-05-05 DIAGNOSIS — I74.09 AORTOILIAC OCCLUSIVE DISEASE (HCC): Chronic | ICD-10-CM

## 2017-05-05 DIAGNOSIS — I73.9 PERIPHERAL ARTERY DISEASE (HCC): Chronic | ICD-10-CM

## 2017-05-05 DIAGNOSIS — I50.20 BENIGN HYPERTENSIVE HEART DISEASE WITH SYSTOLIC CONGESTIVE HEART FAILURE, NYHA CLASS 2 (HCC): Chronic | ICD-10-CM

## 2017-05-05 DIAGNOSIS — E78.5 DYSLIPIDEMIA: Chronic | ICD-10-CM

## 2017-05-05 DIAGNOSIS — D64.9 POSTOPERATIVE ANEMIA: ICD-10-CM

## 2017-05-05 PROCEDURE — 99001 SPECIMEN HANDLING PT-LAB: CPT | Performed by: EMERGENCY MEDICINE

## 2017-05-05 NOTE — PROGRESS NOTES
Chief Complaint   Patient presents with   [facility] Follow Up    Coronary Artery Disease    Cholesterol Problem    Other     PAD

## 2017-05-05 NOTE — PATIENT INSTRUCTIONS

## 2017-05-05 NOTE — MR AVS SNAPSHOT
Visit Information Date & Time Provider Department Dept. Phone Encounter #  
 5/5/2017  9:45 AM Jay Pantoja, [address] [id number] Follow-up Instructions Return in about 4 weeks (around 6/2/2017), or if symptoms worsen or fail to improve. Your Appointments 5/5/2017 10:45 AM  
Office Visit with [facility] NURSE [facility] ([place] [facility]) Appt Note: Wound check per Trista Angel. PT has an appt in the same building and will come by after. Pt confirmed appt; daughter wanted appt moved up; Wound check per Trista Angel. PT has an appt in the same building and will come by after. Pt confirmed appt daughter wanted appt moved up [address] [facility] [address] [facility]  
[phone] [address] [facility] [address] [facility] 5/17/2017  1:20 PM  
POST HOSPITAL with Davy Marin MD  
[facility] ([place] [facility]) Appt Note: per April @ CVT Stepdown . . 4 week [address] [address] [address] [address] [phone] 2300 [facility] [address] 6/19/2017  9:30 AM  
ROUTINE CARE with Jay Pantoja MD  
[facility] ([place] [facility]) Appt Note: 3 month followup [address] [facility] [address] 200 [facility]  
[address] [facility] 200 [facility] [address] Upcoming Health Maintenance Date Due Pneumococcal 19-64 Medium Risk (1 of 1 - PPSV23) [birth year]/22/1979 INFLUENZA AGE 9 TO ADULT 8/1/2017 COLONOSCOPY 7/23/2020 DTaP/Tdap/Td series (2 - Td) 7/5/2026 Allergies as of 5/5/2017  Review Complete On: 5/5/2017 By: Jay Pantoja MD  
 No Known Allergies Current Immunizations  Reviewed on 7/5/2016 Name Date Tdap 7/5/2016 Not reviewed this visit You Were Diagnosed With   
  
 Codes Comments Postoperative anemia    -  Primary ICD-10-CM: D64.9 ICD-9-CM: 285.9 Benign hypertensive heart disease with systolic congestive heart failure, NYHA class 2 (HCC)     ICD-10-CM: I11.0, I50.20 ICD-9-CM: 402.11, 428.20, 428.0 Aortoiliac occlusive disease (HCC)     ICD-10-CM: I74.09 
ICD-9-CM: 444.09 Peripheral artery disease ([facility]     ICD-10-CM: I73.9 ICD-9-CM: 443. 9 Chronic atrial fibrillation (HCC)     ICD-10-CM: S90.7 ICD-9-CM: 427.31 Anticoagulated on Coumadin     ICD-10-CM: Z51.81, Z79.01 
ICD-9-CM: V58.83, V58.61 Dyslipidemia     ICD-10-CM: E78.5 ICD-9-CM: 272.4 Vitamin D deficiency     ICD-10-CM: E55.9 ICD-9-CM: 268.9 Status post femoral-popliteal bypass surgery     ICD-10-CM: Z95.828 ICD-9-CM: V45.89 History of cardioversion     ICD-10-CM: Z98.890 ICD-9-CM: V15.1 Vitals BP Pulse Temp Resp Height(growth percentile) Weight(growth percentile) 118/80 (BP 1 Location: Left arm, BP Patient Position: Sitting) 68 97.8 °F (36.6 °C) (Oral) 16 5' 10\" (1.778 m) 167 lb (75.8 kg) SpO2 BMI Smoking Status 95% 23.96 kg/m2 Former Smoker BMI and BSA Data Body Mass Index Body Surface Area  
 23.96 kg/m 2 1.93 m 2 Preferred Pharmacy Pharmacy Name Phone [address] [facility], [address] [address] Your Updated Medication List  
  
   
This list is accurate as of: 5/5/17 10:16 AM.  Always use your most recent med list.  
  
  
  
  
 amiodarone 200 mg tablet Commonly known as:  CORDARONE Take 1 Tab by mouth two (2) times a day. Start taking in the morning of 5/3/17  Indications: VENTRICULAR RATE CONTROL IN ATRIAL FIBRILLATION  
  
 ascorbic acid (vitamin C) 250 mg tablet Commonly known as:  VITAMIN C Take 1 Tab by mouth daily (with breakfast). aspirin 81 mg chewable tablet Take 1 Tab by mouth daily (with breakfast). atorvastatin 40 mg tablet Commonly known as:  LIPITOR Take 1 Tab by mouth nightly. Indications: DYSLIPIDEMIA  
  
 bisacodyl 5 mg EC tablet Commonly known as:  DULCOLAX Take 2 Tabs by mouth every forty-eight (48) hours as needed for Constipation. Indications: Constipation  
  
 cholecalciferol 1,000 unit tablet Commonly known as:  VITAMIN D3 Take 1 Tab by mouth daily. clopidogrel 75 mg Tab Commonly known as:  PLAVIX Take 1 Tab by mouth daily (with dinner). cyanocobalamin 1,000 mcg tablet Commonly known as:  VITAMIN B-12 Take 1 Tab by mouth daily. dilTIAZem 30 mg tablet Commonly known as:  CARDIZEM Take 1 Tab by mouth Before breakfast, lunch, dinner and at bedtime. Indications: hypertension  
  
 docusate sodium 100 mg capsule Commonly known as:  Otelia Diones Take 1 Cap by mouth two (2) times a day for 90 days. Indications: Constipation DULoxetine 60 mg capsule Commonly known as:  CYMBALTA Take 1 Cap by mouth daily. ferrous sulfate 325 mg (65 mg iron) tablet Take 1 Tab by mouth daily (with breakfast). gabapentin 100 mg capsule Commonly known as:  NEURONTIN Take 1 Cap by mouth two (2) times a day. Indications: NEUROPATHIC PAIN  
  
 losartan 25 mg tablet Commonly known as:  COZAAR Take 1 Tab by mouth daily. Indications: Chronic Heart Failure, hypertension  
  
 metoprolol tartrate 25 mg tablet Commonly known as:  LOPRESSOR Take 0.5 Tabs by mouth every twelve (12) hours. Indications: hypertension, VENTRICULAR RATE CONTROL IN ATRIAL FIBRILLATION  
  
 * OTHER Check PT, INR on 5/1/17- results to Cardiologist immediately. Diagnosis- A Fib  
  
 * OTHER Check CBC, CMP, Mg on 5/1/17. Results to PCP immediately. Diagnosis- PAD  
  
 * OTHER Incentive Spirometry- Use as directed  
  
 oxyCODONE-acetaminophen  mg per tablet Commonly known as:  PERCOCET 10 Take 1 Tab by mouth every four (4) hours as needed (for pain level greater than 4/10). Max Daily Amount: 6 Tabs. Indications: Pain warfarin 2.5 mg tablet Commonly known as:  COUMADIN  
2.5 mg po daily through 5/1/17 and then as per [facility]  
  
 zinc sulfate 220 (50) mg capsule Commonly known as:  ZINCATE Take 1 Cap by mouth daily. * Notice: This list has 3 medication(s) that are the same as other medications prescribed for you. Read the directions carefully, and ask your doctor or other care provider to review them with you. Follow-up Instructions Return in about 4 weeks (around 6/2/2017), or if symptoms worsen or fail to improve. To-Do List   
 05/05/2017 Lab:  CBC WITH AUTOMATED DIFF   
  
 05/05/2017 Lab:  METABOLIC PANEL, BASIC   
  
 05/05/2017 Lab:  VITAMIN D, 25 HYDROXY   
  
 05/06/2017 To Be Determined Appointment with Adebayo Thacker RN at [address] [place] [address] CTR  
  
 05/08/2017 To Be Determined Appointment with Jane Saini RN at [address] [place] [address] MED CTR  
  
 05/10/2017 To Be Determined Appointment with Jane Saini RN at [address] [place] [address] MED CTR  
  
 05/12/2017 To Be Determined Appointment with Jane Saini RN at [address] [place] [address] MED CTR  
  
 05/15/2017 To Be Determined Appointment with Jane Saini RN at [address] [place] [address] MED CTR  
  
 05/18/2017 To Be Determined Appointment with Jane Saini RN at [address] [place] [address] MED CTR  
  
 05/22/2017 To Be Determined Appointment with Jane Saini RN at [address] [place] [address] MED CTR  
  
 05/25/2017 To Be Determined Appointment with Jane Saini RN at [address] [place] [address] MED CTR  
  
 05/29/2017 To Be Determined   Appointment with Jane Saini RN at [address] [place] [address]  
  
 06/01/2017 To Be Determined Appointment with Destiny Virk RN at [address] [facility] Patient Instructions Heart-Healthy Diet: Care Instructions Your Care Instructions A heart-healthy diet has lots of vegetables, fruits, nuts, beans, and whole grains, and is low in salt. It limits foods that are high in saturated fat, such as meats, cheeses, and fried foods. It may be hard to change your diet, but even small changes can lower your risk of heart attack and heart disease. Follow-up care is a key part of your treatment and safety. Be sure to make and go to all appointments, and call your doctor if you are having problems. It's also a good idea to know your test results and keep a list of the medicines you take. How can you care for yourself at home? Watch your portions · Learn what a serving is. A \"serving\" and a \"portion\" are not always the same thing. Make sure that you are not eating larger portions than are recommended. For example, a serving of pasta is ½ cup. A serving size of meat is 2 to 3 ounces. A 3-ounce serving is about the size of a deck of cards. Measure serving sizes until you are good at [place]" them. Keep in mind that restaurants often serve portions that are 2 or 3 times the size of one serving. · To keep your energy level up and keep you from feeling hungry, eat often but in smaller portions. · Eat only the number of calories you need to stay at a healthy weight. If you need to lose weight, eat fewer calories than your body burns (through exercise and other physical activity). Eat more fruits and vegetables · Eat a variety of fruit and vegetables every day. Dark green, deep orange, red, or yellow fruits and vegetables are especially good for you. Examples include spinach, carrots, peaches, and berries. · Keep carrots, celery, and other veggies handy for snacks.  Buy fruit that is in season and store it where you can see it so that you will be tempted to eat it. · Cook dishes that have a lot of veggies in them, such as stir-fries and soups. Limit saturated and trans fat · Read food labels, and try to avoid saturated and trans fats. They increase your risk of heart disease. Trans fat is found in many processed foods such as cookies and crackers. · Use olive or canola oil when you cook. Try cholesterol-lowering spreads, such as Benecol or Take Control. · Bake, broil, grill, or steam foods instead of frying them. · Choose lean meats instead of high-fat meats such as hot dogs and sausages. Cut off all visible fat when you prepare meat. · Eat fish, skinless poultry, and meat alternatives such as soy products instead of high-fat meats. Soy products, such as tofu, may be especially good for your heart. · Choose low-fat or fat-free milk and dairy products. Eat fish · Eat at least two servings of fish a week. Certain fish, such as salmon and tuna, contain omega-3 fatty acids, which may help reduce your risk of heart attack. Eat foods high in fiber · Eat a variety of grain products every day. Include whole-grain foods that have lots of fiber and nutrients. Examples of whole-grain foods include oats, whole wheat bread, and brown rice. · Buy whole-grain breads and cereals, instead of white bread or pastries. Limit salt and sodium · Limit how much salt and sodium you eat to help lower your blood pressure. · Taste food before you salt it. Add only a little salt when you think you need it. With time, your taste buds will adjust to less salt. · Eat fewer snack items, fast foods, and other high-salt, processed foods. Check food labels for the amount of sodium in packaged foods. · Choose low-sodium versions of canned goods (such as soups, vegetables, and beans). Limit sugar · Limit drinks and foods with added sugar. These include candy, desserts, and soda pop. Limit alcohol · Limit alcohol to no more than 2 drinks a day for men and 1 drink a day for women. Too much alcohol can cause health problems. When should you call for help? Watch closely for changes in your health, and be sure to contact your doctor if: 
· You would like help planning heart-healthy meals. Where can you learn more? Go to http://yuniel-lyndsay.info/. Enter V137 in the search box to learn more about \"Heart-Healthy Diet: Care Instructions. \" Current as of: January 27, 2016 Content Version: 11.2 © 4413-4904 [employer]. Care instructions adapted under license by [employer] (which disclaims liability or warranty for this information). If you have questions about a medical condition or this instruction, always ask your healthcare professional. [address] any warranty or liability for your use of this information. Please provide this summary of care documentation to your next provider. Your primary care clinician is listed as Sybil Nunez. If you have any questions after today's visit, please call [phone].

## 2017-05-05 NOTE — PROGRESS NOTES
Parth Ramirez, 59 y.o.,  male    SUBJECTIVE  Hospital/rehab ff-up    [facility] date of admission 4/4/17-4/21/17  [facility] date of admission 4/21/17-4/29/17  Date of communication with NN Your 5/2/2017    Reviewed discharge summary, med rec done. Pt with PAD underwent R axillary to bifemoral artery bypass 4/4/2017. Complicated post op course requiring 2 other bypass procedures with persistent ischemic extremity pain, cut down femoral-femoral bypass 4/10/17 and then R femoral to above the knee popliteal bypass 4/10/17. Post op anemia, upon discharge Hgb 8, c/w iron deficiency. Did not receive blood transfusion, now on po fe. R foot ulcerations- wound care done by home health nurse now, has podiatry visit dr. Heriberto Hampton 5/17    Pt also noted to have Afib, placed on anticoagulation. Currently on coumadin 2.5 mg, this is followed by dr. Mark Menendez. Recent ED visit for nose bleeding, transient/resolved. INR 2.15 in ED 4/30/17. Cards ff-up 5/17    Acute kidney injury, which has resolved. Pain is improved, taking cymbalta, gabapentin and percocet    Living with brother and YENY, frequent visits by daughter who supervises med administration and MD ff-ups. Had issues with med dispensing now has been corrected.      Hypothyroid- TSH level 5.08 (range 0.36-3.74)    ROS:  See HPI, all others negative        Patient Active Problem List   Diagnosis Code    Benign hypertensive heart disease with systolic congestive heart failure, NYHA class 2 ([facility]) I11.0, I50.20    Vitamin B12 deficiency E53.8    DDD (degenerative disc disease), lumbar M51.36    Erectile dysfunction N52.9    Spinal stenosis of lumbar region with radiculopathy M48.06, M54.16    Hereditary peripheral neuropathy G60.9    Aortoiliac occlusive disease ([address] I74.09    Atherosclerosis of native artery of both lower extremities with intermittent claudication ([address] M13.840    Peripheral artery disease ([address] I73.9    Coronary artery disease involving native coronary artery of native heart I25.10    Chronic atrial fibrillation (HCC) I48.2    Transient alteration of awareness R40.4    Acute blood loss as cause of postoperative anemia D62    Impaired mobility and ADLs Z74.09    Anticoagulated on Coumadin Z51.81, Z79.01    Ischemic cardiomyopathy I25.5    Chronic systolic heart failure (HCC) I50.22    Carotid artery disease (HCC) I77.9    Hypomagnesemia E83.42    Elevated creatine kinase level R74.8    Hypokalemia E87.6    Hyperammonemia (HCC) E72.20    Dyslipidemia E78.5    Hyperuricemia E79.0    Euthyroid sick syndrome E07.81    Aftercare following surgery of the circulatory system Z48.812    Status post femoral-popliteal bypass surgery Z95.828    History of cardioversion Z98.890    Critical ischemia of lower extremity I99.8    Chronic ulcer of right foot ([facility]) L97.519    Vitamin D deficiency E55.9       Current Outpatient Prescriptions   Medication Sig Dispense Refill    losartan (COZAAR) 25 mg tablet Take 1 Tab by mouth daily. Indications: Chronic Heart Failure, hypertension 30 Tab 6    amiodarone (CORDARONE) 200 mg tablet Take 1 Tab by mouth two (2) times a day. Start taking in the morning of 5/3/17  Indications: VENTRICULAR RATE CONTROL IN ATRIAL FIBRILLATION 60 Tab 0    ascorbic acid, vitamin C, (VITAMIN C) 250 mg tablet Take 1 Tab by mouth daily (with breakfast). 30 Tab 0    aspirin 81 mg chewable tablet Take 1 Tab by mouth daily (with breakfast). 30 Tab 0    atorvastatin (LIPITOR) 40 mg tablet Take 1 Tab by mouth nightly. Indications: DYSLIPIDEMIA 30 Tab 0    clopidogrel (PLAVIX) 75 mg tab Take 1 Tab by mouth daily (with dinner). 30 Tab 0    dilTIAZem (CARDIZEM) 30 mg tablet Take 1 Tab by mouth Before breakfast, lunch, dinner and at bedtime. Indications: hypertension (Patient taking differently: Take 120 mg by mouth daily. Indications: hypertension) 120 Tab 0    DULoxetine (CYMBALTA) 60 mg capsule Take 1 Cap by mouth daily.  30 Cap 0    ferrous sulfate 325 mg (65 mg iron) tablet Take 1 Tab by mouth daily (with breakfast). 30 Tab 0    gabapentin (NEURONTIN) 100 mg capsule Take 1 Cap by mouth two (2) times a day. Indications: NEUROPATHIC PAIN 60 Cap 0    metoprolol tartrate (LOPRESSOR) 25 mg tablet Take 0.5 Tabs by mouth every twelve (12) hours. Indications: hypertension, VENTRICULAR RATE CONTROL IN ATRIAL FIBRILLATION 30 Tab 0    oxyCODONE-acetaminophen (PERCOCET 10)  mg per tablet Take 1 Tab by mouth every four (4) hours as needed (for pain level greater than 4/10). Max Daily Amount: 6 Tabs. Indications: Pain 20 Tab 0    zinc sulfate (ZINCATE) 220 (50) mg capsule Take 1 Cap by mouth daily. 30 Cap 0    warfarin (COUMADIN) 2.5 mg tablet 2.5 mg po daily through 5/1/17 and then as per [facility] Cardiology 30 Tab 0    cholecalciferol (VITAMIN D3) 1,000 unit tablet Take 1 Tab by mouth daily. 30 Tab 0    cyanocobalamin (VITAMIN B-12) 1,000 mcg tablet Take 1 Tab by mouth daily. 90 Tab 3    bisacodyl (DULCOLAX) 5 mg EC tablet Take 2 Tabs by mouth every forty-eight (48) hours as needed for Constipation. Indications: Constipation 14 Tab 0    docusate sodium (COLACE) 100 mg capsule Take 1 Cap by mouth two (2) times a day for 90 days. Indications: Constipation 60 Cap 0    OTHER Check PT, INR on 5/1/17- results to Cardiologist immediately. Diagnosis- A Fib 1 Each 0    OTHER Check CBC, CMP, Mg on 5/1/17. Results to PCP immediately.  Diagnosis- PAD 1 Each 0    OTHER Incentive Spirometry- Use as directed 1 Each 0       No Known Allergies    Past Medical History:   Diagnosis Date    Acute blood loss as cause of postoperative anemia 4/4/2017    Anticoagulated on Coumadin     Aortoiliac occlusive disease ([facility] 1/25/2017    Atherosclerosis of native artery of both lower extremities with intermittent claudication ([facility] 1/25/2017    Benign hypertensive heart disease with systolic congestive heart failure, NYHA class 2 ([facility] 1/26/2015    Carotid artery disease ([facility]     Chronic atrial fibrillation (HCC)     Chronic systolic heart failure (HCC)     Chronic ulcer of right foot ([facility] 4/4/2017    Coronary artery disease involving native coronary artery of native heart 3/15/2017    Successful stenting of Cx (Xience LESLEY) and RCA (Xience LESLEY) to 0% by Dr. Alan Griffin on 3/15/17.  DDD (degenerative disc disease), lumbar 1/26/2015    Dyslipidemia     Erectile dysfunction 7/5/2016    Euthyroid sick syndrome 4/6/2017    Hereditary peripheral neuropathy 11/15/2016    History of cardioversion 4/18/2017    S/P Synchronized external cardioversion (4/18/2017 - Dr. Mumtaz Smith)    Hyperuricemia     Ischemic cardiomyopathy     Peripheral artery disease ([facility] 1/25/2017    Spinal stenosis of lumbar region with radiculopathy 5/4/2015    Dr. Gu Cancer Vitamin D deficiency 4/22/2017    Vitamin D 25-Hydroxy (4/22/2017) = 12.0       Social History     Social History    Marital status: LEGALLY      Spouse name: N/A    Number of children: N/A    Years of education: N/A     Occupational History    Not on file. Social History Main Topics    Smoking status: Former Smoker    Smokeless tobacco: Never Used      Comment: quit in 2011    Alcohol use 1.2 oz/week     2 Cans of beer per week      Comment: 10 cans of beer a month    Drug use: Yes     Special: Marijuana      Comment: occasionally    Sexual activity: Yes     Partners: Female     Other Topics Concern    Not on file     Social History Narrative       Family History   Problem Relation Age of Onset    Heart Disease Father          OBJECTIVE    Physical Exam:     Visit Vitals    /80 (BP 1 Location: Left arm, BP Patient Position: Sitting)    Pulse 68    Temp 97.8 °F (36.6 °C) (Oral)    Resp 16    Ht 5' 10\" (1.778 m)    Wt 167 lb (75.8 kg)    SpO2 95%    BMI 23.96 kg/m2       General: alert, pale, chronically ill appearing  Head: atraumatic.  Non-tender maxillary and frontal sinuses  Neck: supple, no adenopathy palpated  CVS: normal rate, regular rhythm, distinct S1 and S2  Lungs:clear to ausculation bilaterally, no crackles, wheezing or rhonchi noted  Abdomen: normoactive bowel sounds, soft, non-tender  Extremities: R foot with dressing  Skin: warm, no lesions, rashes noted  Psych:  mood and affect normal        ASSESSMENT/PLAN  Siomara Costello was seen today for hospital follow up, coronary artery disease, cholesterol problem and other. Diagnoses and all orders for this visit:    Postoperative anemia    -     CBC WITH AUTOMATED DIFF; Future    Benign hypertensive heart disease with systolic congestive heart failure, NYHA class 2 (HCC)  -     METABOLIC PANEL, BASIC; Future    Aortoiliac occlusive disease (HCC)    Peripheral artery disease (HCC)    Chronic atrial fibrillation (HCC)    Anticoagulated on Coumadin    Dyslipidemia    Vitamin D deficiency  -     VITAMIN D, 25 HYDROXY; Future    Status post femoral-popliteal bypass surgery    History of cardioversion    Hypothyroidism, unspecified type  -     TSH 3RD GENERATION; Future    Complicated post op course, requiring 3 surgical intervention for PAD  Short term acute rehab stay, now home with brother and clinically improving  PAD-cont care with dr. Kathleen Callahan, on plavix/asa/statin, pain is fairly controlled, cont PT/OT  Wound care/ulcers- with dr. Freida Roberts podiatry, ff-up 5/17 home nurse to cont wound care  Post op anemia c/w HERIBERTO, improving, cont po fe w/ vit c, recheck in cbc 4 weeks  CAD s/p stent- asymptomatic, on asa/plavix/bb/statin,  cont care with cardiology ff-up 5/17  Chronic afib- INR goal 2.5-3.5, followed by dr. Kay Guerra. Currently on coumadin  Vit d deficiency- on replacement, recheck Vit D  Elevated TSH- recheck TSH  HTN w/ CHF [facility] class 2- controlled, cont losartan, BB  MAYRA-resolved  AMS-resolved      Follow-up Disposition:  Return in about 4 weeks (around 6/2/2017), or if symptoms worsen or fail to improve.       Patient understands plan of care. Patient has provided input and agrees with goals.

## 2017-05-06 ENCOUNTER — HOME CARE VISIT (OUTPATIENT)
Dept: SCHEDULING | Facility: HOME HEALTH | Age: 57
End: 2017-05-06
Payer: COMMERCIAL

## 2017-05-06 LAB
ALBUMIN SERPL-MCNC: 3.9 G/DL (ref 3.5–5.5)
ALBUMIN/GLOB SERPL: 1.1 {RATIO} (ref 1.2–2.2)
ALP SERPL-CCNC: 543 IU/L (ref 39–117)
ALT SERPL-CCNC: 25 IU/L (ref 0–44)
AMBIG ABBREV CMP14 DEFAULT,977206: NORMAL
AST SERPL-CCNC: 32 IU/L (ref 0–40)
BASOPHILS # BLD AUTO: 0.1 X10E3/UL (ref 0–0.2)
BASOPHILS NFR BLD AUTO: 1 %
BILIRUB SERPL-MCNC: 0.4 MG/DL (ref 0–1.2)
BUN SERPL-MCNC: 10 MG/DL (ref 6–24)
BUN/CREAT SERPL: 13 (ref 9–20)
CALCIUM SERPL-MCNC: 9.3 MG/DL (ref 8.7–10.2)
CHLORIDE SERPL-SCNC: 98 MMOL/L (ref 96–106)
CO2 SERPL-SCNC: 18 MMOL/L (ref 18–29)
CREAT SERPL-MCNC: 0.77 MG/DL (ref 0.76–1.27)
EOSINOPHIL # BLD AUTO: 0.2 X10E3/UL (ref 0–0.4)
EOSINOPHIL NFR BLD AUTO: 2 %
ERYTHROCYTE [DISTWIDTH] IN BLOOD BY AUTOMATED COUNT: 15.7 % (ref 12.3–15.4)
GLOBULIN SER CALC-MCNC: 3.7 G/DL (ref 1.5–4.5)
GLUCOSE SERPL-MCNC: 74 MG/DL (ref 65–99)
HCT VFR BLD AUTO: 27.6 % (ref 37.5–51)
HGB BLD-MCNC: 8.7 G/DL (ref 12.6–17.7)
IMM GRANULOCYTES # BLD: 0 X10E3/UL (ref 0–0.1)
IMM GRANULOCYTES NFR BLD: 0 %
INR PPP: NORMAL
LYMPHOCYTES # BLD AUTO: 1.8 X10E3/UL (ref 0.7–3.1)
LYMPHOCYTES NFR BLD AUTO: 15 %
MAGNESIUM SERPL-MCNC: 1.7 MG/DL (ref 1.6–2.3)
MCH RBC QN AUTO: 28.1 PG (ref 26.6–33)
MCHC RBC AUTO-ENTMCNC: 31.5 G/DL (ref 31.5–35.7)
MCV RBC AUTO: 89 FL (ref 79–97)
MONOCYTES # BLD AUTO: 1 X10E3/UL (ref 0.1–0.9)
MONOCYTES NFR BLD AUTO: 9 %
NEUTROPHILS # BLD AUTO: 8.6 X10E3/UL (ref 1.4–7)
NEUTROPHILS NFR BLD AUTO: 73 %
PLATELET # BLD AUTO: 532 X10E3/UL (ref 150–379)
POTASSIUM SERPL-SCNC: 4.4 MMOL/L (ref 3.5–5.2)
PROT SERPL-MCNC: 7.6 G/DL (ref 6–8.5)
PROTHROMBIN TIME: NORMAL S
RBC # BLD AUTO: 3.1 X10E6/UL (ref 4.14–5.8)
SODIUM SERPL-SCNC: 138 MMOL/L (ref 134–144)
WBC # BLD AUTO: 11.8 X10E3/UL (ref 3.4–10.8)

## 2017-05-06 PROCEDURE — G0299 HHS/HOSPICE OF RN EA 15 MIN: HCPCS

## 2017-05-08 ENCOUNTER — HOME CARE VISIT (OUTPATIENT)
Dept: SCHEDULING | Facility: HOME HEALTH | Age: 57
End: 2017-05-08
Payer: COMMERCIAL

## 2017-05-08 ENCOUNTER — TELEPHONE ANTICOAG (OUTPATIENT)
Dept: CARDIOLOGY CLINIC | Age: 57
End: 2017-05-08

## 2017-05-08 VITALS
RESPIRATION RATE: 14 BRPM | SYSTOLIC BLOOD PRESSURE: 128 MMHG | HEART RATE: 81 BPM | TEMPERATURE: 98.7 F | OXYGEN SATURATION: 98 % | DIASTOLIC BLOOD PRESSURE: 70 MMHG

## 2017-05-08 DIAGNOSIS — Z79.01 ANTICOAGULATED ON COUMADIN: ICD-10-CM

## 2017-05-08 DIAGNOSIS — I48.20 CHRONIC ATRIAL FIBRILLATION (HCC): ICD-10-CM

## 2017-05-08 LAB
INR BLD: 2 (ref 0.9–1.1)
INR, EXTERNAL: 2
PT POC: 23.5 SEC
PT, EXTERNAL: 23.5

## 2017-05-08 PROCEDURE — G0299 HHS/HOSPICE OF RN EA 15 MIN: HCPCS

## 2017-05-09 ENCOUNTER — HOME CARE VISIT (OUTPATIENT)
Dept: HOME HEALTH SERVICES | Facility: HOME HEALTH | Age: 57
End: 2017-05-09
Payer: COMMERCIAL

## 2017-05-09 VITALS
OXYGEN SATURATION: 99 % | HEART RATE: 66 BPM | TEMPERATURE: 97.8 F | DIASTOLIC BLOOD PRESSURE: 53 MMHG | RESPIRATION RATE: 20 BRPM | SYSTOLIC BLOOD PRESSURE: 96 MMHG

## 2017-05-09 VITALS — DIASTOLIC BLOOD PRESSURE: 72 MMHG | SYSTOLIC BLOOD PRESSURE: 114 MMHG

## 2017-05-09 PROCEDURE — G0152 HHCP-SERV OF OT,EA 15 MIN: HCPCS

## 2017-05-09 NOTE — PROGRESS NOTES
Notes Recorded by Caitie Cameron MD on 5/9/2017 at 1:13 PM  pls forward to dr. Sathish Figueroa office, they are following anticoagulation treatment

## 2017-05-10 ENCOUNTER — TELEPHONE (OUTPATIENT)
Dept: VASCULAR SURGERY | Age: 57
End: 2017-05-10

## 2017-05-10 ENCOUNTER — HOME CARE VISIT (OUTPATIENT)
Dept: HOME HEALTH SERVICES | Facility: HOME HEALTH | Age: 57
End: 2017-05-10
Payer: COMMERCIAL

## 2017-05-10 NOTE — DISCHARGE SUMMARY
[address]  [address] KdJanna Amin SUMMARY    Name:  German Agudelo  MR#:  [id number]  :  1960  Account #:  [de-identified]  Date of Adm:  2017  Date of Discharge:  2017      DIAGNOSES OF THE PATIENT:  1. Impaired mobility and activities of daily living. 2. Status post right axillobifemoral bypass. 3. History of critical ischemia to the right lower extremity secondary to  peripheral arterial disease. 4. Anemia, which was felt to be acute postoperative blood loss anemia. 5. Hypertension. 6. Chronic systolic congestive heart failure, which is compensated. 7. Chronic atrial fibrillation. The patient is on Coumadin. 8. Chronic right foot. 9. Vitamin D deficiency. DISCHARGE MEDICATIONS:  1. Vitamin C 250 mg p.o. daily. 2. Dulcolax 10 mg p.o. every 48 hours p.r.n. constipation. 3. Vitamin D3, 1000 units p.o. daily. 4. Cardizem 30 mg tablet 1 tablet p.o. before breakfast, lunch and  dinner, and then at bedtime. 5. Colace 100 mg p.o. twice daily. 6. Ferrous sulfate 325 mg p.o. daily. 7. Neurontin 100 mg p.o. twice daily. 8. Zinc sulfate 220 mg capsule p.o. daily. 9. Amiodarone 400 mg p.o. twice daily through 2017. Please  note that the 400 mg of the amiodarone should be taken through  2017 and then this should be stopped and starting from  2017 the patient should be on amiodarone 200 mg p.o. twice  daily. I have discussed this dosing of amiodarone with the patient and  he verbalized understanding and agreed. 10. Aspirin 81 mg p.o. daily. 11. Lipitor 40 mg p.o. daily. 12. Plavix 75 mg p.o. daily. 13. Lopressor 12.5 mg p.o. every 12 hours. 14. Percocet 10/325 mg 1 tablet p.o. every 4 hours p.r.n.  15. Coumadin as directed. 16. Vitamin B12, 1000 mcg p.o. daily. 17. Cymbalta 60 mg p.o. daily. Check PT/INR on 2017. Results to cardiologist immediately. Check-up CBC, CMP, magnesium on 2017. Results to PCP  immediately.  Incentive spirometry, use as directed. COURSE OF THE PATIENT IN REHAB: This is a 25-year-old male  [facility] and underwent  a vascular procedure for peripheral arterial disease. Subsequently, it  was felt that the patient should be transitioned to the rehab so the  patient was transferred to the Acute Rehab Unit at [facility]. the  patient was started on a comprehensive inpatient rehabilitation  program including PT and OT. The patient showed some slow  progress. The patient's blood pressures were monitored. His  medication regimen was adjusted and Coumadin was continued. Daily  PT/INR checks were done. It was felt that the patient could then be  transitioned home with home health care was requested and  arrangements were made. I discussed the discharge plan with the  patient and he verbalized understanding and agreed. I also discussed  with the . The patient was discharged to home. Total time for the discharge was more than 35 minutes. Please note  that the patient was hemodynamically stable at the time of discharge. The patient was advised to followup with his PCP in 1 week and with  his cardiologist in 10 days and with vascular Surgery in 10 days.         Joe Cook MD    [facility] / [facility], [facility].  D:  05/10/2017   00:53  T:  05/10/2017   05:10  Job #:  [id number]

## 2017-05-10 NOTE — PROGRESS NOTES
Patient identified with 2 identifiers (name and ).   Patient daughter aware of anemia improving and to continue oral iron supplement

## 2017-05-10 NOTE — TELEPHONE ENCOUNTER
----- Message from Luis Antonio Garcia RN sent at 5/10/2017 10:11 AM EDT -----  Regarding: Change in treatment  Contact: [phone]  Talked with Jinny Corbett RN and wondered if we could switch up to betadine to heel and upper wound and hydrogel or silbasorb to all 3 with foam dressings and then the loose tubigrip. Thanks and have a wonderful day!

## 2017-05-11 ENCOUNTER — HOME CARE VISIT (OUTPATIENT)
Dept: SCHEDULING | Facility: HOME HEALTH | Age: 57
End: 2017-05-11
Payer: COMMERCIAL

## 2017-05-11 ENCOUNTER — PATIENT OUTREACH (OUTPATIENT)
Dept: FAMILY MEDICINE CLINIC | Age: 57
End: 2017-05-11

## 2017-05-11 ENCOUNTER — HOME CARE VISIT (OUTPATIENT)
Dept: HOME HEALTH SERVICES | Facility: HOME HEALTH | Age: 57
End: 2017-05-11
Payer: COMMERCIAL

## 2017-05-11 VITALS
RESPIRATION RATE: 20 BRPM | SYSTOLIC BLOOD PRESSURE: 140 MMHG | DIASTOLIC BLOOD PRESSURE: 80 MMHG | TEMPERATURE: 98.1 F | HEART RATE: 60 BPM

## 2017-05-11 PROCEDURE — G0299 HHS/HOSPICE OF RN EA 15 MIN: HCPCS

## 2017-05-11 PROCEDURE — G0152 HHCP-SERV OF OT,EA 15 MIN: HCPCS

## 2017-05-11 NOTE — PROGRESS NOTES
Call placed to patient for weekly [place] call. Patient stated that he has graduated from home health PT and OT. Patient stated that the medication boxes were working well for him. Patient has appointment with the Vascular surgeon on 5/17/2017. Reminded of that appointment patient acknowleged appointment date and time and said his daughter would be taking him. He assured me he was doing just fine. Follow up call scheduled for 10 days or so.

## 2017-05-12 VITALS — DIASTOLIC BLOOD PRESSURE: 72 MMHG | SYSTOLIC BLOOD PRESSURE: 128 MMHG

## 2017-05-12 DIAGNOSIS — I70.213 ATHEROSCLEROSIS OF NATIVE ARTERY OF BOTH LOWER EXTREMITIES WITH INTERMITTENT CLAUDICATION (HCC): Primary | Chronic | ICD-10-CM

## 2017-05-12 RX ORDER — OXYCODONE AND ACETAMINOPHEN 10; 325 MG/1; MG/1
1 TABLET ORAL
Qty: 60 TAB | Refills: 0 | Status: SHIPPED | OUTPATIENT
Start: 2017-05-12 | End: 2017-06-02

## 2017-05-12 NOTE — TELEPHONE ENCOUNTER
Order for percocet 10/325 mg 1 tab every 6 hours prn pain #60/0 placed per Verbal order from Dr. Devora Otero . Pt verbalized understanding .

## 2017-05-13 ENCOUNTER — HOME CARE VISIT (OUTPATIENT)
Dept: SCHEDULING | Facility: HOME HEALTH | Age: 57
End: 2017-05-13

## 2017-05-15 ENCOUNTER — HOME CARE VISIT (OUTPATIENT)
Dept: SCHEDULING | Facility: HOME HEALTH | Age: 57
End: 2017-05-15
Payer: COMMERCIAL

## 2017-05-15 ENCOUNTER — HOME CARE VISIT (OUTPATIENT)
Dept: HOME HEALTH SERVICES | Facility: HOME HEALTH | Age: 57
End: 2017-05-15
Payer: COMMERCIAL

## 2017-05-15 ENCOUNTER — TELEPHONE ANTICOAG (OUTPATIENT)
Dept: CARDIOLOGY CLINIC | Age: 57
End: 2017-05-15

## 2017-05-15 VITALS
DIASTOLIC BLOOD PRESSURE: 67 MMHG | RESPIRATION RATE: 20 BRPM | OXYGEN SATURATION: 95 % | SYSTOLIC BLOOD PRESSURE: 115 MMHG | TEMPERATURE: 98.7 F | HEART RATE: 69 BPM

## 2017-05-15 DIAGNOSIS — I48.20 CHRONIC ATRIAL FIBRILLATION (HCC): ICD-10-CM

## 2017-05-15 DIAGNOSIS — Z79.01 ANTICOAGULATED ON COUMADIN: ICD-10-CM

## 2017-05-15 LAB
INR BLD: 1.6 (ref 0.9–1.1)
INR, EXTERNAL: 1.6
PT POC: 18.7 SEC

## 2017-05-15 PROCEDURE — G0299 HHS/HOSPICE OF RN EA 15 MIN: HCPCS

## 2017-05-15 NOTE — PROGRESS NOTES
Verbal order and read back per Juan Luis Koenig, DO  The INR is below the therapeutic range. Please make the following adjustments to Coumadin dosinmg x 2 days then Coumadin 2.5 mg daily. Repeat the INR in 1 week.     Jocelyn with home health aware of instruction

## 2017-05-16 ENCOUNTER — HOME CARE VISIT (OUTPATIENT)
Dept: HOME HEALTH SERVICES | Facility: HOME HEALTH | Age: 57
End: 2017-05-16
Payer: COMMERCIAL

## 2017-05-17 ENCOUNTER — OFFICE VISIT (OUTPATIENT)
Dept: CARDIOLOGY CLINIC | Age: 57
End: 2017-05-17

## 2017-05-17 ENCOUNTER — OFFICE VISIT (OUTPATIENT)
Dept: VASCULAR SURGERY | Age: 57
End: 2017-05-17

## 2017-05-17 VITALS
WEIGHT: 167 LBS | SYSTOLIC BLOOD PRESSURE: 100 MMHG | BODY MASS INDEX: 23.91 KG/M2 | HEART RATE: 63 BPM | HEIGHT: 70 IN | RESPIRATION RATE: 10 BRPM | DIASTOLIC BLOOD PRESSURE: 78 MMHG

## 2017-05-17 VITALS
RESPIRATION RATE: 18 BRPM | SYSTOLIC BLOOD PRESSURE: 116 MMHG | WEIGHT: 173 LBS | HEART RATE: 75 BPM | OXYGEN SATURATION: 96 % | DIASTOLIC BLOOD PRESSURE: 68 MMHG | BODY MASS INDEX: 24.82 KG/M2

## 2017-05-17 DIAGNOSIS — I70.213 ATHEROSCLEROSIS OF NATIVE ARTERY OF BOTH LOWER EXTREMITIES WITH INTERMITTENT CLAUDICATION (HCC): Chronic | ICD-10-CM

## 2017-05-17 DIAGNOSIS — I77.9 BILATERAL CAROTID ARTERY DISEASE (HCC): Chronic | ICD-10-CM

## 2017-05-17 DIAGNOSIS — I48.20 CHRONIC ATRIAL FIBRILLATION (HCC): Primary | Chronic | ICD-10-CM

## 2017-05-17 DIAGNOSIS — I48.20 CHRONIC ATRIAL FIBRILLATION (HCC): Chronic | ICD-10-CM

## 2017-05-17 DIAGNOSIS — I74.09 AORTOILIAC OCCLUSIVE DISEASE (HCC): Primary | Chronic | ICD-10-CM

## 2017-05-17 NOTE — MR AVS SNAPSHOT
Visit Information Date & Time Provider Department Dept. Phone Encounter #  
 5/17/2017  1:20 PM Danya Wallace MD [facility] [address] [id number] Your Appointments 6/2/2017 10:00 AM  
ROUTINE CARE with Chandler Ceja MD  
[facility] (Chandana Flicker) Appt Note: routine f/u 4wks [address]  
  
    
 6/7/2017  9:00 AM  
PROCEDURE with [facility] 2 [facility] (Chandana Flicker) Appt Note: AX [address]  
[phone] [address] [place] [address]  
  
    
 6/7/2017 10:00 AM  
PROCEDURE with [facility] 2 [facility] (Chandana Flicker) Appt Note: FEM FEM [address] [facility] [address]  
[phone]  
  
    
 6/7/2017 11:00 AM  
PROCEDURE with [facility] NONIMAGING [facility] (Chandana Flicker) Appt Note: MARINA [address] [facility] [address]  
[phone] [address]  
  
    
 6/7/2017  1:45 PM  
Office Visit with Autumn Ballard MD  
[address] [place] [address] and [facility] Flicker) Appt Note: OV SAME DAY PREP  
 [address] [place] [address] [address] [facility] [facility] [address]  
[phone] Counts include 234 beds at the [facility] [address]  
  
    
 6/19/2017  9:30 AM  
ROUTINE CARE with Chandler Ceja MD  
[facility] (Chandana Flicker) Appt Note: 3 month followup [address] [facility] [facility] [address]  
[phone] Upcoming Health Maintenance Date Due Pneumococcal 19-64 Medium Risk (1 of 1 - PPSV23) 3/22/1977 INFLUENZA AGE 9 TO ADULT 8/1/2017 COLONOSCOPY 7/23/2020 DTaP/Tdap/Td series (2 - Td) 7/5/2026 Allergies as of 5/17/2017  Review Complete On: 5/17/2017 By: JUNE Bower No Known Allergies Current Immunizations  Reviewed on 7/5/2016 Name Date Tdap 7/5/2016 Not reviewed this visit You Were Diagnosed With   
  
 Codes Comments Chronic atrial fibrillation (HCC)    -  Primary ICD-10-CM: Y50.2 ICD-9-CM: 427.31 Vitals BP Pulse Resp Weight(growth percentile) SpO2 BMI  
 116/68 75 18 173 lb (78.5 kg) 96% 24.82 kg/m2 Smoking Status Former Smoker BMI and BSA Data Body Mass Index Body Surface Area  
 24.82 kg/m 2 1.97 m 2 Preferred Pharmacy Pharmacy Name Phone [address] [facility], [address] Your Updated Medication List  
  
   
This list is accurate as of: 5/17/17  1:56 PM.  Always use your most recent med list.  
  
  
  
  
 amiodarone 200 mg tablet Commonly known as:  CORDARONE Take 1 Tab by mouth two (2) times a day. Start taking in the morning of 5/3/17  Indications: VENTRICULAR RATE CONTROL IN ATRIAL FIBRILLATION  
  
 ascorbic acid (vitamin C) 250 mg tablet Commonly known as:  VITAMIN C Take 1 Tab by mouth daily (with breakfast). aspirin 81 mg chewable tablet Take 1 Tab by mouth daily (with breakfast). atorvastatin 40 mg tablet Commonly known as:  LIPITOR Take 1 Tab by mouth nightly. Indications: DYSLIPIDEMIA  
  
 bisacodyl 5 mg EC tablet Commonly known as:  DULCOLAX Take 2 Tabs by mouth every forty-eight (48) hours as needed for Constipation. Indications: Constipation  
  
 cholecalciferol 1,000 unit tablet Commonly known as:  VITAMIN D3 Take 1 Tab by mouth daily. clopidogrel 75 mg Tab Commonly known as:  PLAVIX Take 1 Tab by mouth daily (with dinner). cyanocobalamin 1,000 mcg tablet Commonly known as:  VITAMIN B-12 Take 1 Tab by mouth daily. dilTIAZem 30 mg tablet Commonly known as:  CARDIZEM Take 1 Tab by mouth Before breakfast, lunch, dinner and at bedtime. Indications: hypertension  
  
 docusate sodium 100 mg capsule Commonly known as:  Otelia Diones Take 1 Cap by mouth two (2) times a day for 90 days. Indications: Constipation DULoxetine 60 mg capsule Commonly known as:  CYMBALTA Take 1 Cap by mouth daily. ferrous sulfate 325 mg (65 mg iron) tablet Take 1 Tab by mouth daily (with breakfast). gabapentin 100 mg capsule Commonly known as:  NEURONTIN Take 1 Cap by mouth two (2) times a day. Indications: NEUROPATHIC PAIN  
  
 losartan 25 mg tablet Commonly known as:  COZAAR Take 1 Tab by mouth daily. Indications: Chronic Heart Failure, hypertension  
  
 metoprolol tartrate 25 mg tablet Commonly known as:  LOPRESSOR Take 0.5 Tabs by mouth every twelve (12) hours. Indications: hypertension, VENTRICULAR RATE CONTROL IN ATRIAL FIBRILLATION  
  
 * OTHER Check PT, INR on 5/1/17- results to Cardiologist immediately. Diagnosis- A Fib  
  
 * OTHER Check CBC, CMP, Mg on 5/1/17. Results to PCP immediately. Diagnosis- PAD  
  
 * OTHER Incentive Spirometry- Use as directed  
  
 oxyCODONE-acetaminophen  mg per tablet Commonly known as:  PERCOCET 10 Take 1 Tab by mouth every six (6) hours as needed (for pain level greater than 4/10). Max Daily Amount: 4 Tabs. Indications: Pain  
  
 warfarin 2.5 mg tablet Commonly known as:  COUMADIN  
2.5 mg po daily through 5/1/17 and then as per [facility]  
  
 zinc sulfate 220 (50) mg capsule Commonly known as:  ZINCATE Take 1 Cap by mouth daily. * Notice: This list has 3 medication(s) that are the same as other medications prescribed for you. Read the directions carefully, and ask your doctor or other care provider to review them with you. We Performed the Following AMB POC EKG ROUTINE W/ 12 LEADS, INTER & REP [28789 CPT(R)] To-Do List   
 05/19/2017 To Be Determined Appointment with Jane Saini RN at [facility] REG MED CTR  
  
 05/22/2017 To Be Determined Appointment with Jane Saini, RN at [facility] MED CTR  
  
 05/24/2017 To Be Determined Appointment with Jane Saini RN at [address] [place] [address] REG MED CTR  
  
 05/26/2017 To Be Determined Appointment with Jane Saini RN at [address] [place] [address] MED CTR  
  
 05/29/2017 To Be Determined Appointment with Jane Saini RN at [facility] MED CTR  
  
 05/31/2017 To Be Determined Appointment with Jane Saini RN at [facility] MED CTR  
  
 06/02/2017 To Be Determined Appointment with Jane Saini RN at [facility] MED CTR  
  
 06/05/2017 To Be Determined Appointment with Jane Saini RN at [facility] MED CTR  
  
 06/07/2017 To Be Determined Appointment with Jane Saini RN at [facility] MED CTR  
  
 06/09/2017 To Be Determined Appointment with Jane Saini RN at [facility] MED CTR  
  
 06/12/2017 To Be Determined Appointment with Jane Saini RN at [address] [place] [address] REG MED CTR  
  
 06/14/2017 To Be Determined Appointment with Jane Saini RN at [facility] REG MED CTR  
  
 06/16/2017 To Be Determined Appointment with Jane Saini RN at [facility] MED CTR  
  
 06/19/2017 To Be Determined Appointment with Apolinar Gonzalez RN at [facility] REG MED CTR  
  
 06/21/2017 To Be Determined Appointment with Apolinar Gonzalez RN at [address] [place] [address] REG MED CTR  
  
 06/23/2017 To Be Determined Appointment with Apolinar Gonzalez RN at [address] [place] [address] MED CTR  
  
 06/26/2017 To Be Determined Appointment with Apolinar Gonzalez RN at [address] [place] [address] MED CTR  
  
 06/28/2017 To Be Determined Appointment with Apolinar Gonzalez RN at [address] [place] [address] Please provide this summary of care documentation to your next provider. Your primary care clinician is listed as Alice Harris. If you have any questions after today's visit, please call [phone].

## 2017-05-17 NOTE — PROGRESS NOTES
Cleansed wounds to right lower leg and foot with wound cleanser and gauze. Applied iodosorb to right great toe and medi honey to right lateral proximal and distal lower leg and right lateral heel. Applied kerlix and tape and then tubi  size E, patient tolerated well.

## 2017-05-17 NOTE — PROGRESS NOTES
PATIENT NAME: Hilary Bear         59 y.o.      1960              DATE:5/17/2017    REASON FOR VISIT: Coronary artery disease, atrial fibrillation    HISTORY OF PRESENT ILLNESS: Status post PTCA. Denies chest pain and other symptoms suggestive of angina. Status post DC cardioversion for atrial fibrillation. Denies palpitation, syncope, presyncope. Status post angioplasty right lower extremity. Claudication improving. Anticoagulated. No signs of bleeding. INRs are being managed by this office. PAST MEDICAL HISTORY:   Past Medical History:  4/4/2017: Acute blood loss as cause of postoperative ane*  No date: Anticoagulated on Coumadin  1/25/2017: Aortoiliac occlusive disease ([address]  1/25/2017: Atherosclerosis of native artery of both lower*  1/26/2015: Benign hypertensive heart disease with systoli*  No date: Carotid artery disease (HCC)  No date: Chronic atrial fibrillation ([address]  No date: Chronic systolic heart failure ([address]  4/4/2017: Chronic ulcer of right foot ([address]  3/15/2017: Coronary artery disease involving native coron*      Comment: Successful stenting of Cx (Xience LESLEY) and RCA               (Xience LESLEY) to 0% by Dr. Eda Patel on 3/15/17.  1/26/2015: DDD (degenerative disc disease), lumbar  No date: Dyslipidemia  7/5/2016: Erectile dysfunction  4/6/2017: Euthyroid sick syndrome  11/15/2016: Hereditary peripheral neuropathy  4/18/2017:  History of cardioversion      Comment: S/P Synchronized external cardioversion                (4/18/2017 - Dr. Maki Howell)  No date: Hyperuricemia  No date: Ischemic cardiomyopathy  1/25/2017: Peripheral artery disease ([address]  5/4/2015: Spinal stenosis of lumbar region with radiculo*      Comment: Dr. Raheem Marie  4/22/2017: Vitamin D deficiency      Comment: Vitamin D 25-Hydroxy (4/22/2017) = 12.0    PAST SURGICAL HISTORY:   Past Surgical History:  3/8/2017: CARDIAC CATHETERIZATION      Comment:    3/15/2017: CARDIAC CATHETERIZATION      Comment: 3/15/2017: CORONARY STENT SINGLE W/PTCA      Comment:    07/23/2015: HX COLONOSCOPY      Comment: polyps repeat 2020 5 years Harmony Henderson  04/04/2017: HX FEMORAL BYPASS Right      Comment: S/P Right axillary to bifemoral artery bypass                using an 8 mm Propaten graft from the right                axilla to the right common femoral artery with                a jump femoral-femoral bypass with another 8 mm               Propaten external ring bypass; Right common                femoral artery, and profunda femoris artery and               superficial femoral artery endarterectomy                causing an extensive surgery on the right side                (4/4/2017 - Dr. Moses Hernandez)  04/07/2017: HX FEMORAL BYPASS Right      Comment: S/P Cutdown of femoral-femoral bypass, more on               the left groin side; Right lower extremity                angiography with first-order catheterization                (4/7/2017 - Dr. Ifeanyi Bhatti)  04/10/2017: HX FEMORAL BYPASS Right      Comment: S/P Right femoral to above-knee popliteal                bypass with an 8 mm PTFE graft (4/10/2017 - Dr. Land)      SOCIAL HISTORY:  Social History    Marital status: LEGALLY    Spouse name:                       Years of education:                 Number of children:               Social History Main Topics    Smoking status: Former Smoker                                                                Packs/day: 0.00      Years: 0.00        Smokeless status: Never Used                        Comment: quit in 2011    Alcohol use: Yes           1.2 oz/week       2 Cans of beer per week       Comment: 10 cans of beer a month    Drug use:  Yes                Special: Marijuana       Comment: occasionally    Sexual activity: Yes               Partners with: Female        ALLERGIES:   No Known Allergies     CURRENT MEDICATIONS:   Current Outpatient Prescriptions:  oxyCODONE-acetaminophen (PERCOCET 10)  mg per tablet, Take 1 Tab by mouth every six (6) hours as needed (for pain level greater than 4/10). Max Daily Amount: 4 Tabs. Indications: Pain  losartan (COZAAR) 25 mg tablet, Take 1 Tab by mouth daily. Indications: Chronic Heart Failure, hypertension  amiodarone (CORDARONE) 200 mg tablet, Take 1 Tab by mouth two (2) times a day. Start taking in the morning of 5/3/17  Indications: VENTRICULAR RATE CONTROL IN ATRIAL FIBRILLATION  ascorbic acid, vitamin C, (VITAMIN C) 250 mg tablet, Take 1 Tab by mouth daily (with breakfast). aspirin 81 mg chewable tablet, Take 1 Tab by mouth daily (with breakfast). atorvastatin (LIPITOR) 40 mg tablet, Take 1 Tab by mouth nightly. Indications: DYSLIPIDEMIA  clopidogrel (PLAVIX) 75 mg tab, Take 1 Tab by mouth daily (with dinner). dilTIAZem (CARDIZEM) 30 mg tablet, Take 1 Tab by mouth Before breakfast, lunch, dinner and at bedtime. Indications: hypertension (Patient taking differently: Take 120 mg by mouth daily. Indications: hypertension)  DULoxetine (CYMBALTA) 60 mg capsule, Take 1 Cap by mouth daily. ferrous sulfate 325 mg (65 mg iron) tablet, Take 1 Tab by mouth daily (with breakfast). gabapentin (NEURONTIN) 100 mg capsule, Take 1 Cap by mouth two (2) times a day. Indications: NEUROPATHIC PAIN  metoprolol tartrate (LOPRESSOR) 25 mg tablet, Take 0.5 Tabs by mouth every twelve (12) hours. Indications: hypertension, VENTRICULAR RATE CONTROL IN ATRIAL FIBRILLATION  zinc sulfate (ZINCATE) 220 (50) mg capsule, Take 1 Cap by mouth daily. warfarin (COUMADIN) 2.5 mg tablet, 2.5 mg po daily through 5/1/17 and then as per [facility] (Patient taking differently: Take 2.5 mg by mouth five (5) days a week. TAKE COUMADIN 5 MG X 2 DAYS, THEN TAKE COUMADIN 2.5 MG DAILY . CHECK PROTIME ON MONDAY 5/22/17)  OTHER, Check CBC, CMP, Mg on 5/1/17. Results to PCP immediately.  Diagnosis- PAD  OTHER, Incentive Spirometry- Use as directed  cholecalciferol (VITAMIN D3) 1,000 unit tablet, Take 1 Tab by mouth daily. cyanocobalamin (VITAMIN B-12) 1,000 mcg tablet, Take 1 Tab by mouth daily. bisacodyl (DULCOLAX) 5 mg EC tablet, Take 2 Tabs by mouth every forty-eight (48) hours as needed for Constipation. Indications: Constipation  docusate sodium (COLACE) 100 mg capsule, Take 1 Cap by mouth two (2) times a day for 90 days. Indications: Constipation  OTHER, Check PT, INR on 5/1/17- results to Cardiologist immediately. Diagnosis- A Fib    No current facility-administered medications for this visit. REVIEW of SYSTEMS:History obtained from chart review and the patient  General ROS: negative for - weight gain or weight loss  Hematological and Lymphatic ROS: negative for - bleeding problems  Respiratory ROS: no cough, shortness of breath, or wheezing  Cardiovascular ROS: See history of present illness  Gastrointestinal ROS: Denies signs of gastrointestinal bleeding  Neurological ROS: no TIA or stroke symptoms     PHYSICAL EXAMINATION:   /68  Pulse 75  Resp 18  Wt 78.5 kg (173 lb)  SpO2 96%  BMI 24.82 kg/m2  BP Readings from Last 3 Encounters:  05/17/17 : 116/68  05/17/17 : 100/78  05/15/17 : 115/67    Pulse Readings from Last 3 Encounters:  05/17/17 : 75  05/17/17 : 63  05/15/17 : 69    Wt Readings from Last 3 Encounters:  05/17/17 : 78.5 kg (173 lb)  05/17/17 : 75.8 kg (167 lb)  05/05/17 : 75.8 kg (167 lb)    General: Well-developed white male in no apparent distress. HEENT: Sclera clear. Mucous membranes pink and moist.  Neck: No jugular venous distention. Carotid upstrokes 2+ without bruits. Chest: Clear to  auscultation. Heart: PMI not palpable. Regular rhythm. No murmur or gallop. Abdomen: Nontender without masses or organomegaly. Extremities: Right lower extremity wrapped. 1+ edema left lower extremity. Skin: Erythema left lower extremity. .  Neuro: Alert, oriented, speech WNL, no facial asymmetry. Gait WNL. EKG: Sinus rhythm.   Nonspecific ST-T changes    IMPRESSION:   Coronary artery disease, asymptomatic status post PTCA of right coronary and circumflex lesions  Peripheral vascular disease, claudication improving status post angioplasty  History of atrial fibrillation. Sinus rhythm status post cardioversion. Anticoagulation with Coumadin, uncomplicated    PLAN:  Continue present medications. Return to office in 1 month. Will decrease amiodarone to 200 mg daily at that time. The diagnoses and plan were discussed with patient and caregiver. All questions answered. Plan of care agreed to by all concerned. Jazz Hernandez.  MD Zack       ,

## 2017-05-17 NOTE — PROGRESS NOTES
Fabiana Wilburn    No chief complaint on file. History and Physical    Fabiana Wilburn is a 59 y.o. male with history of aortoiliac occlusive disease and peripheral arterial disease. He is now s/p right axillary to fem-fem bypass on 4/4. Unfortunately he continued to have right leg pain and had a follow up angiogram. He was noted to have an occluded SFA. Stent was attempted but was unsuccessful so he underwent right fem-pop bypass with rapid improvement. He also had issues of afib which was treated by cardiology eventually with cardioversion. 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 He presents today in follow-up. He is doing very well overall. He does have multiple ulcerations on the right lower extremity and foot and is following with Dr. Tremaine Chirinos for his podiatry care. He has home health care coming 3 times a week for wound care. He does complain of some shooting pains in the foot but otherwise states he is feeling much better. He is walking with a walker as well. He states that he is anxious to get back to work and to start driving again. He denies any fevers or chills. Past Medical History:   Diagnosis Date    Acute blood loss as cause of postoperative anemia 4/4/2017    Anticoagulated on Coumadin     Aortoiliac occlusive disease (HCC) 1/25/2017    Atherosclerosis of native artery of both lower extremities with intermittent claudication ([address] 1/25/2017    Benign hypertensive heart disease with systolic congestive heart failure, NYHA class 2 ([address] 1/26/2015    Carotid artery disease (HCC)     Chronic atrial fibrillation (HCC)     Chronic systolic heart failure (HCC)     Chronic ulcer of right foot ([address] 4/4/2017    Coronary artery disease involving native coronary artery of native heart 3/15/2017    Successful stenting of Cx (Xience LESLEY) and RCA (Xience LESLEY) to 0% by Dr. Constanza Roque on 3/15/17.     DDD (degenerative disc disease), lumbar 1/26/2015    Dyslipidemia     Erectile dysfunction 7/5/2016    Euthyroid sick syndrome 4/6/2017    Hereditary peripheral neuropathy 11/15/2016    History of cardioversion 4/18/2017    S/P Synchronized external cardioversion (4/18/2017 - Dr. Nilay Cárdenas)    Hyperuricemia     Ischemic cardiomyopathy     Peripheral artery disease ([facility] 1/25/2017    Spinal stenosis of lumbar region with radiculopathy 5/4/2015    Dr. Samia Dickens Vitamin D deficiency 4/22/2017    Vitamin D 25-Hydroxy (4/22/2017) = 12.0     Past Surgical History:   Procedure Laterality Date    CARDIAC CATHETERIZATION  3/8/2017         CARDIAC CATHETERIZATION  3/15/2017         CORONARY STENT SINGLE W/PTCA  3/15/2017         HX COLONOSCOPY  07/23/2015    polyps repeat 2020 5 years Mauro Henderson    HX FEMORAL BYPASS Right 04/04/2017    S/P Right axillary to bifemoral artery bypass using an 8 mm Propaten graft from the right axilla to the right common femoral artery with a jump femoral-femoral bypass with another 8 mm Propaten external ring bypass; Right common femoral artery, and profunda femoris artery and superficial femoral artery endarterectomy causing an extensive surgery on the right side (4/4/2017 - Dr. Philippe Castro)    [address] Right 04/07/2017    S/P Cutdown of femoral-femoral bypass, more on the left groin side; Right lower extremity angiography with first-order catheterization (4/7/2017 - Dr. Sunny Ivey)    HX FEMORAL BYPASS Right 04/10/2017    S/P Right femoral to above-knee popliteal bypass with an 8 mm PTFE graft (4/10/2017 - Dr. Eliezer Denton)     Patient Active Problem List   Diagnosis Code    Benign hypertensive heart disease with systolic congestive heart failure, NYHA class 2 ([facility]) I11.0, I50.20    Vitamin B12 deficiency E53.8    DDD (degenerative disc disease), lumbar M51.36    Erectile dysfunction N52.9    Spinal stenosis of lumbar region with radiculopathy M48.06, M54.16    Hereditary peripheral neuropathy G60.9    Aortoiliac occlusive disease ([facility]) I74.09    Atherosclerosis of native artery of both lower extremities with intermittent claudication ([facility]) I70.213    Peripheral artery disease ([facility]) I73.9    Coronary artery disease involving native coronary artery of native heart I25.10    Chronic atrial fibrillation ([facility]) I48.2    Transient alteration of awareness R40.4    Acute blood loss as cause of postoperative anemia D62    Impaired mobility and ADLs Z74.09    Anticoagulated on Coumadin Z51.81, Z79.01    Ischemic cardiomyopathy I25.5    Chronic systolic heart failure ([facility]) I50.22    Carotid artery disease ([facility]) I77.9    Hypomagnesemia E83.42    Elevated creatine kinase level R74.8    Hypokalemia E87.6    Hyperammonemia ([facility]) E72.20    Dyslipidemia E78.5    Hyperuricemia E79.0    Euthyroid sick syndrome E07.81    Aftercare following surgery of the circulatory system Z48.812    Status post femoral-popliteal bypass surgery Z95.828    History of cardioversion Z98.890    Critical ischemia of lower extremity I99.8    Chronic ulcer of right foot ([facility]) L97.519    Vitamin D deficiency E55.9     Current Outpatient Prescriptions   Medication Sig Dispense Refill    oxyCODONE-acetaminophen (PERCOCET 10)  mg per tablet Take 1 Tab by mouth every six (6) hours as needed (for pain level greater than 4/10). Max Daily Amount: 4 Tabs. Indications: Pain 60 Tab 0    losartan (COZAAR) 25 mg tablet Take 1 Tab by mouth daily. Indications: Chronic Heart Failure, hypertension 30 Tab 6    amiodarone (CORDARONE) 200 mg tablet Take 1 Tab by mouth two (2) times a day. Start taking in the morning of 5/3/17  Indications: VENTRICULAR RATE CONTROL IN ATRIAL FIBRILLATION 60 Tab 0    ascorbic acid, vitamin C, (VITAMIN C) 250 mg tablet Take 1 Tab by mouth daily (with breakfast). 30 Tab 0    aspirin 81 mg chewable tablet Take 1 Tab by mouth daily (with breakfast).  30 Tab 0    atorvastatin (LIPITOR) 40 mg tablet Take 1 Tab by mouth nightly. Indications: DYSLIPIDEMIA 30 Tab 0    bisacodyl (DULCOLAX) 5 mg EC tablet Take 2 Tabs by mouth every forty-eight (48) hours as needed for Constipation. Indications: Constipation 14 Tab 0    clopidogrel (PLAVIX) 75 mg tab Take 1 Tab by mouth daily (with dinner). 30 Tab 0    dilTIAZem (CARDIZEM) 30 mg tablet Take 1 Tab by mouth Before breakfast, lunch, dinner and at bedtime. Indications: hypertension (Patient taking differently: Take 120 mg by mouth daily. Indications: hypertension) 120 Tab 0    docusate sodium (COLACE) 100 mg capsule Take 1 Cap by mouth two (2) times a day for 90 days. Indications: Constipation 60 Cap 0    DULoxetine (CYMBALTA) 60 mg capsule Take 1 Cap by mouth daily. 30 Cap 0    ferrous sulfate 325 mg (65 mg iron) tablet Take 1 Tab by mouth daily (with breakfast). 30 Tab 0    gabapentin (NEURONTIN) 100 mg capsule Take 1 Cap by mouth two (2) times a day. Indications: NEUROPATHIC PAIN 60 Cap 0    metoprolol tartrate (LOPRESSOR) 25 mg tablet Take 0.5 Tabs by mouth every twelve (12) hours. Indications: hypertension, VENTRICULAR RATE CONTROL IN ATRIAL FIBRILLATION 30 Tab 0    zinc sulfate (ZINCATE) 220 (50) mg capsule Take 1 Cap by mouth daily. 30 Cap 0    warfarin (COUMADIN) 2.5 mg tablet 2.5 mg po daily through 5/1/17 and then as per [facility] (Patient taking differently: Take 2.5 mg by mouth five (5) days a week. TAKE COUMADIN 5 MG X 2 DAYS, THEN TAKE COUMADIN 2.5 MG DAILY . CHECK PROTIME ON MONDAY 5/22/17) 30 Tab 0    OTHER Check PT, INR on 5/1/17- results to Cardiologist immediately. Diagnosis- A Fib 1 Each 0    OTHER Check CBC, CMP, Mg on 5/1/17. Results to PCP immediately. Diagnosis- PAD 1 Each 0    OTHER Incentive Spirometry- Use as directed 1 Each 0    cholecalciferol (VITAMIN D3) 1,000 unit tablet Take 1 Tab by mouth daily. 30 Tab 0    cyanocobalamin (VITAMIN B-12) 1,000 mcg tablet Take 1 Tab by mouth daily.  80 Tab 3     No Known Allergies    Physical Exam:    Visit Vitals    /78 (BP 1 Location: Left arm, BP Patient Position: Sitting)    Pulse 63    Resp 10    Ht 5' 10\" (1.778 m)    Wt 167 lb (75.8 kg)    BMI 23.96 kg/m2      General: Well-appearing male in no acute distress. Appears older than age  [de-identified]: EOMI, no scleral icterus is noted. Chest: Right chest incision healing well, clean dry and intact. There is some mild redness at the incision site. No fluctuance. No drainage. He has multiphasic Doppler signals of his right axillary to femoral bypass. Pulmonary: No increased work or breathing is noted. Abdomen: soft, nondistended. Extremities: Warm and well perfused bilaterally. Pt has +RLE edema. He does have dry gangrenous changes to his right great toe and heel, multiple lower extremity ulcerations on the lateral right leg. He has multiphasic DP and PT Doppler signals on the right. Neuro: Cranial nerves II through XII are grossly intact       Impression and Plan:  Mary Ann Starks is a 59 y.o. male with aortoiliac occlusive disease and PAD. He is now status post right axillary to fem- femoral bypass as well as right fem- popliteal bypass. He is doing well overall. He does have some right lower extremity ulcerations as well as gangrenous right toe and heel ulcer which is being followed by podiatry. He also has [address]. Doppler signals are at least biphasic throughout. Discussed that we will obtain follow-up ultrasounds at this time and he will follow-up in 2-3 weeks afterwards. Sooner as needed. We will further discuss returning to work and driving at his follow-up visit. Plan was discussed. Patient expresses understanding and agrees. Follow-up Disposition:  Return in about 3 weeks (around 6/7/2017). Home Cat  [phone]        PLEASE NOTE:  This document has been produced using voice recognition software. Unrecognized errors in transcription may be present.

## 2017-05-19 ENCOUNTER — HOME CARE VISIT (OUTPATIENT)
Dept: SCHEDULING | Facility: HOME HEALTH | Age: 57
End: 2017-05-19
Payer: COMMERCIAL

## 2017-05-19 PROCEDURE — G0299 HHS/HOSPICE OF RN EA 15 MIN: HCPCS

## 2017-05-22 ENCOUNTER — TELEPHONE ANTICOAG (OUTPATIENT)
Dept: CARDIOLOGY CLINIC | Age: 57
End: 2017-05-22

## 2017-05-22 ENCOUNTER — HOME CARE VISIT (OUTPATIENT)
Dept: HOME HEALTH SERVICES | Facility: HOME HEALTH | Age: 57
End: 2017-05-22
Payer: COMMERCIAL

## 2017-05-22 ENCOUNTER — HOME CARE VISIT (OUTPATIENT)
Dept: SCHEDULING | Facility: HOME HEALTH | Age: 57
End: 2017-05-22
Payer: COMMERCIAL

## 2017-05-22 VITALS
OXYGEN SATURATION: 98 % | SYSTOLIC BLOOD PRESSURE: 113 MMHG | TEMPERATURE: 98.1 F | RESPIRATION RATE: 20 BRPM | HEART RATE: 60 BPM | DIASTOLIC BLOOD PRESSURE: 59 MMHG

## 2017-05-22 DIAGNOSIS — Z79.01 ANTICOAGULATED ON COUMADIN: ICD-10-CM

## 2017-05-22 DIAGNOSIS — I48.20 CHRONIC ATRIAL FIBRILLATION (HCC): ICD-10-CM

## 2017-05-22 LAB — INR, EXTERNAL: 2

## 2017-05-22 PROCEDURE — G0299 HHS/HOSPICE OF RN EA 15 MIN: HCPCS

## 2017-05-22 NOTE — PROGRESS NOTES
Verbal order and read back per Flores Farias NP  The INR is stable and therapeutic. Continue same dose of coumadin and recheck in 1 week.   Continue Coumadin 2.5 mg daily  Home health nurse Jocelyn informed of instructions, read back and verbalized understanding Jena Rivera LPN

## 2017-05-23 ENCOUNTER — PATIENT OUTREACH (OUTPATIENT)
Dept: FAMILY MEDICINE CLINIC | Age: 57
End: 2017-05-23

## 2017-05-23 VITALS
RESPIRATION RATE: 20 BRPM | TEMPERATURE: 98.4 F | DIASTOLIC BLOOD PRESSURE: 67 MMHG | OXYGEN SATURATION: 96 % | SYSTOLIC BLOOD PRESSURE: 116 MMHG | HEART RATE: 54 BPM

## 2017-05-23 NOTE — PROGRESS NOTES
Nurse Navigator attempted to contact patient for [place] follow up call. Message left introducing myself, the purpose of the call and giving my contact information. Requested that patient call back at his earliest convenience.

## 2017-05-24 ENCOUNTER — HOME CARE VISIT (OUTPATIENT)
Dept: SCHEDULING | Facility: HOME HEALTH | Age: 57
End: 2017-05-24
Payer: COMMERCIAL

## 2017-05-24 PROCEDURE — G0299 HHS/HOSPICE OF RN EA 15 MIN: HCPCS

## 2017-05-25 VITALS
DIASTOLIC BLOOD PRESSURE: 69 MMHG | TEMPERATURE: 98.2 F | OXYGEN SATURATION: 97 % | RESPIRATION RATE: 20 BRPM | SYSTOLIC BLOOD PRESSURE: 129 MMHG | HEART RATE: 60 BPM

## 2017-05-26 ENCOUNTER — HOME CARE VISIT (OUTPATIENT)
Dept: SCHEDULING | Facility: HOME HEALTH | Age: 57
End: 2017-05-26
Payer: COMMERCIAL

## 2017-05-26 ENCOUNTER — HOSPITAL ENCOUNTER (OUTPATIENT)
Dept: LAB | Age: 57
Discharge: HOME | End: 2017-05-26

## 2017-05-26 VITALS
TEMPERATURE: 98.1 F | DIASTOLIC BLOOD PRESSURE: 62 MMHG | SYSTOLIC BLOOD PRESSURE: 121 MMHG | OXYGEN SATURATION: 97 % | HEART RATE: 56 BPM | RESPIRATION RATE: 20 BRPM

## 2017-05-26 PROCEDURE — G0299 HHS/HOSPICE OF RN EA 15 MIN: HCPCS

## 2017-05-26 PROCEDURE — 99001 SPECIMEN HANDLING PT-LAB: CPT | Performed by: FAMILY MEDICINE

## 2017-05-27 LAB
25(OH)D3+25(OH)D2 SERPL-MCNC: 38.7 NG/ML (ref 30–100)
BASOPHILS # BLD AUTO: 0 X10E3/UL (ref 0–0.2)
BASOPHILS NFR BLD AUTO: 0 %
BUN SERPL-MCNC: 8 MG/DL (ref 6–24)
BUN/CREAT SERPL: 12 (ref 9–20)
CALCIUM SERPL-MCNC: 9.1 MG/DL (ref 8.7–10.2)
CHLORIDE SERPL-SCNC: 98 MMOL/L (ref 96–106)
CO2 SERPL-SCNC: 22 MMOL/L (ref 18–29)
CREAT SERPL-MCNC: 0.69 MG/DL (ref 0.76–1.27)
EOSINOPHIL # BLD AUTO: 0.2 X10E3/UL (ref 0–0.4)
EOSINOPHIL NFR BLD AUTO: 2 %
ERYTHROCYTE [DISTWIDTH] IN BLOOD BY AUTOMATED COUNT: 15.5 % (ref 12.3–15.4)
GLUCOSE SERPL-MCNC: 93 MG/DL (ref 65–99)
HCT VFR BLD AUTO: 29.8 % (ref 37.5–51)
HGB BLD-MCNC: 9.1 G/DL (ref 12.6–17.7)
IMM GRANULOCYTES # BLD: 0 X10E3/UL (ref 0–0.1)
IMM GRANULOCYTES NFR BLD: 0 %
LYMPHOCYTES # BLD AUTO: 1.9 X10E3/UL (ref 0.7–3.1)
LYMPHOCYTES NFR BLD AUTO: 18 %
MCH RBC QN AUTO: 27.7 PG (ref 26.6–33)
MCHC RBC AUTO-ENTMCNC: 30.5 G/DL (ref 31.5–35.7)
MCV RBC AUTO: 91 FL (ref 79–97)
MONOCYTES # BLD AUTO: 0.8 X10E3/UL (ref 0.1–0.9)
MONOCYTES NFR BLD AUTO: 7 %
NEUTROPHILS # BLD AUTO: 7.8 X10E3/UL (ref 1.4–7)
NEUTROPHILS NFR BLD AUTO: 73 %
PLATELET # BLD AUTO: 634 X10E3/UL (ref 150–379)
POTASSIUM SERPL-SCNC: 3.9 MMOL/L (ref 3.5–5.2)
RBC # BLD AUTO: 3.29 X10E6/UL (ref 4.14–5.8)
SODIUM SERPL-SCNC: 139 MMOL/L (ref 134–144)
TSH SERPL DL<=0.005 MIU/L-ACNC: 5.7 UIU/ML (ref 0.45–4.5)
WBC # BLD AUTO: 10.7 X10E3/UL (ref 3.4–10.8)

## 2017-05-29 ENCOUNTER — HOME CARE VISIT (OUTPATIENT)
Dept: SCHEDULING | Facility: HOME HEALTH | Age: 57
End: 2017-05-29
Payer: COMMERCIAL

## 2017-05-29 LAB
INR BLD: 1.9 (ref 0.9–1.1)
INR, EXTERNAL: 1.9
PT POC: 22.6 SECONDS (ref 11.8–14.9)
PT, EXTERNAL: 22.6

## 2017-05-29 PROCEDURE — G0299 HHS/HOSPICE OF RN EA 15 MIN: HCPCS

## 2017-05-30 ENCOUNTER — TELEPHONE ANTICOAG (OUTPATIENT)
Dept: CARDIOLOGY CLINIC | Age: 57
End: 2017-05-30

## 2017-05-30 VITALS
OXYGEN SATURATION: 98 % | SYSTOLIC BLOOD PRESSURE: 126 MMHG | DIASTOLIC BLOOD PRESSURE: 75 MMHG | TEMPERATURE: 98.5 F | RESPIRATION RATE: 20 BRPM | HEART RATE: 65 BPM

## 2017-05-30 DIAGNOSIS — Z79.01 ANTICOAGULATED ON COUMADIN: ICD-10-CM

## 2017-05-30 DIAGNOSIS — R79.89 ELEVATED TSH: Primary | ICD-10-CM

## 2017-05-30 DIAGNOSIS — I48.20 CHRONIC ATRIAL FIBRILLATION (HCC): ICD-10-CM

## 2017-05-30 NOTE — PROGRESS NOTES
Anemia continues to improve cont po fe, vit d level normal, cont 1000 iu daily. Thyroid function still not within normal range. will need to verify with further lab test. Ordered labs (thyroid cascade), pls notify pt.

## 2017-05-30 NOTE — PROGRESS NOTES
Verbal order and read back per Kade Chavez NP  The INR is below the therapeutic range. Please make the following adjustments to Coumadin dosing: Increase Coumadin to 2.5 mg daily except 3.75 mg on Tuesday   Repeat the INR in 1 week.   NKECHI Cornejo informed of instructions, read back and verbalized understanding Santi Kraus LPN

## 2017-05-31 ENCOUNTER — HOME CARE VISIT (OUTPATIENT)
Dept: SCHEDULING | Facility: HOME HEALTH | Age: 57
End: 2017-05-31
Payer: COMMERCIAL

## 2017-05-31 PROCEDURE — G0299 HHS/HOSPICE OF RN EA 15 MIN: HCPCS

## 2017-05-31 RX ORDER — DILTIAZEM HYDROCHLORIDE 30 MG/1
30 TABLET, FILM COATED ORAL
Qty: 120 TAB | Refills: 0 | Status: SHIPPED | OUTPATIENT
Start: 2017-05-31 | End: 2017-06-02 | Stop reason: SDUPTHER

## 2017-05-31 NOTE — PROGRESS NOTES
Patient identified with 2 identifiers (name and ). Patient aware of anemia improving and to continue iron. Patient aware vit d level is normal and to continue 1000 iu of vit d daily. Patient aware that that repeat thyroid function needs to be repeated. On friday.

## 2017-06-01 VITALS
OXYGEN SATURATION: 97 % | RESPIRATION RATE: 20 BRPM | DIASTOLIC BLOOD PRESSURE: 70 MMHG | TEMPERATURE: 98.3 F | HEART RATE: 53 BPM | SYSTOLIC BLOOD PRESSURE: 129 MMHG

## 2017-06-02 ENCOUNTER — OFFICE VISIT (OUTPATIENT)
Dept: FAMILY MEDICINE CLINIC | Age: 57
End: 2017-06-02

## 2017-06-02 ENCOUNTER — HOME CARE VISIT (OUTPATIENT)
Dept: SCHEDULING | Facility: HOME HEALTH | Age: 57
End: 2017-06-02
Payer: COMMERCIAL

## 2017-06-02 VITALS
SYSTOLIC BLOOD PRESSURE: 128 MMHG | BODY MASS INDEX: 22.9 KG/M2 | HEIGHT: 70 IN | WEIGHT: 160 LBS | RESPIRATION RATE: 16 BRPM | OXYGEN SATURATION: 99 % | HEART RATE: 56 BPM | TEMPERATURE: 97.8 F | DIASTOLIC BLOOD PRESSURE: 68 MMHG

## 2017-06-02 DIAGNOSIS — E55.9 VITAMIN D DEFICIENCY: Chronic | ICD-10-CM

## 2017-06-02 DIAGNOSIS — I10 ESSENTIAL HYPERTENSION: Primary | ICD-10-CM

## 2017-06-02 DIAGNOSIS — I25.10 CORONARY ARTERY DISEASE INVOLVING NATIVE CORONARY ARTERY OF NATIVE HEART WITHOUT ANGINA PECTORIS: Chronic | ICD-10-CM

## 2017-06-02 DIAGNOSIS — I73.9 PERIPHERAL ARTERY DISEASE (HCC): Chronic | ICD-10-CM

## 2017-06-02 DIAGNOSIS — E78.5 DYSLIPIDEMIA: Chronic | ICD-10-CM

## 2017-06-02 DIAGNOSIS — D62 ACUTE BLOOD LOSS AS CAUSE OF POSTOPERATIVE ANEMIA: ICD-10-CM

## 2017-06-02 DIAGNOSIS — I48.20 CHRONIC ATRIAL FIBRILLATION (HCC): Chronic | ICD-10-CM

## 2017-06-02 DIAGNOSIS — R79.89 ELEVATED TSH: ICD-10-CM

## 2017-06-02 PROBLEM — E83.42 HYPOMAGNESEMIA: Status: RESOLVED | Noted: 2017-04-06 | Resolved: 2017-06-02

## 2017-06-02 PROBLEM — R74.8 ELEVATED CREATINE KINASE LEVEL: Status: RESOLVED | Noted: 2017-04-05 | Resolved: 2017-06-02

## 2017-06-02 PROBLEM — E87.6 HYPOKALEMIA: Status: RESOLVED | Noted: 2017-04-04 | Resolved: 2017-06-02

## 2017-06-02 PROCEDURE — G0299 HHS/HOSPICE OF RN EA 15 MIN: HCPCS

## 2017-06-02 RX ORDER — GUAIFENESIN 100 MG/5ML
81 LIQUID (ML) ORAL
Qty: 90 TAB | Refills: 3 | Status: SHIPPED | OUTPATIENT
Start: 2017-06-02 | End: 2018-06-20 | Stop reason: SDUPTHER

## 2017-06-02 RX ORDER — WARFARIN 2.5 MG/1
TABLET ORAL
Qty: 30 TAB | Refills: 6 | Status: SHIPPED | OUTPATIENT
Start: 2017-06-02 | End: 2017-07-13 | Stop reason: ALTCHOICE

## 2017-06-02 RX ORDER — AMIODARONE HYDROCHLORIDE 200 MG/1
200 TABLET ORAL 2 TIMES DAILY
Qty: 60 TAB | Refills: 0 | Status: CANCELLED | OUTPATIENT
Start: 2017-06-02

## 2017-06-02 RX ORDER — MELATONIN
1000 DAILY
Qty: 90 TAB | Refills: 3 | Status: SHIPPED | OUTPATIENT
Start: 2017-06-02 | End: 2017-10-20

## 2017-06-02 RX ORDER — LOSARTAN POTASSIUM 25 MG/1
25 TABLET ORAL DAILY
Qty: 90 TAB | Refills: 2 | Status: ON HOLD | OUTPATIENT
Start: 2017-06-02 | End: 2017-08-16

## 2017-06-02 RX ORDER — ATORVASTATIN CALCIUM 40 MG/1
40 TABLET, FILM COATED ORAL
Qty: 90 TAB | Refills: 3 | Status: SHIPPED | OUTPATIENT
Start: 2017-06-02 | End: 2018-03-06 | Stop reason: ALTCHOICE

## 2017-06-02 RX ORDER — DILTIAZEM HYDROCHLORIDE 30 MG/1
30 TABLET, FILM COATED ORAL
Qty: 120 TAB | Refills: 0 | Status: CANCELLED | OUTPATIENT
Start: 2017-06-02

## 2017-06-02 RX ORDER — LANOLIN ALCOHOL/MO/W.PET/CERES
325 CREAM (GRAM) TOPICAL
Qty: 90 TAB | Refills: 3 | Status: SHIPPED | OUTPATIENT
Start: 2017-06-02 | End: 2017-10-20

## 2017-06-02 RX ORDER — OXYCODONE AND ACETAMINOPHEN 5; 325 MG/1; MG/1
1 TABLET ORAL
Qty: 40 TAB | Refills: 0 | Status: SHIPPED | OUTPATIENT
Start: 2017-06-02 | End: 2017-07-12

## 2017-06-02 RX ORDER — DULOXETIN HYDROCHLORIDE 60 MG/1
60 CAPSULE, DELAYED RELEASE ORAL DAILY
Qty: 90 CAP | Refills: 2 | Status: SHIPPED | OUTPATIENT
Start: 2017-06-02 | End: 2017-08-25

## 2017-06-02 RX ORDER — DILTIAZEM HYDROCHLORIDE 30 MG/1
30 TABLET, FILM COATED ORAL
Qty: 120 TAB | Refills: 6 | Status: SHIPPED | OUTPATIENT
Start: 2017-06-02 | End: 2017-08-25

## 2017-06-02 RX ORDER — CLOPIDOGREL BISULFATE 75 MG/1
75 TABLET ORAL
Qty: 30 TAB | Refills: 0 | Status: SHIPPED | OUTPATIENT
Start: 2017-06-02 | End: 2017-06-19 | Stop reason: SDUPTHER

## 2017-06-02 RX ORDER — ZINC SULFATE 50(220)MG
220 CAPSULE ORAL DAILY
Qty: 90 CAP | Refills: 0 | Status: SHIPPED | OUTPATIENT
Start: 2017-06-02 | End: 2017-10-29 | Stop reason: SDUPTHER

## 2017-06-02 RX ORDER — ASCORBIC ACID 250 MG
250 TABLET ORAL
Qty: 90 TAB | Refills: 2 | Status: SHIPPED | OUTPATIENT
Start: 2017-06-02 | End: 2017-10-20

## 2017-06-02 RX ORDER — AMIODARONE HYDROCHLORIDE 200 MG/1
200 TABLET ORAL 2 TIMES DAILY
Qty: 60 TAB | Refills: 0 | Status: SHIPPED | OUTPATIENT
Start: 2017-06-02 | End: 2017-07-13 | Stop reason: SDUPTHER

## 2017-06-02 RX ORDER — CLOPIDOGREL BISULFATE 75 MG/1
75 TABLET ORAL
Qty: 30 TAB | Refills: 0 | Status: CANCELLED | OUTPATIENT
Start: 2017-06-02

## 2017-06-02 RX ORDER — METOPROLOL TARTRATE 25 MG/1
12.5 TABLET, FILM COATED ORAL EVERY 12 HOURS
Qty: 30 TAB | Refills: 6 | Status: ON HOLD | OUTPATIENT
Start: 2017-06-02 | End: 2017-08-24

## 2017-06-02 NOTE — PROGRESS NOTES
Severiano Olea, 59 y.o.,  male    SUBJECTIVE  Ff-up      PAD s/p R axillary to bifemoral artery bypass 4/4/2017, femoral-femoral bypass 4/10/17  R femoral to above the knee popliteal bypass 4/10/17. Improving, following HALEY Ponce foot ulcers better per pt following dr. Susy Gupta, home health wound nurse visits as well. Pain is improved, taking cymbalta    Post op anemia- improving, continues to take po fe     Afib, placed on anticoagulation-reviewed dr. Alon Pope note considering reduction of amiodarone dose on next visit    Living with brother and YENY, frequent visits by daughter who supervises med administration and MD ff-ups    Noted elevated TSH level- says appetite is fair.      ROS:  See HPI, all others negative        Patient Active Problem List   Diagnosis Code    Benign hypertensive heart disease with systolic congestive heart failure, NYHA class 2 (HCC) I11.0, I50.20    Vitamin B12 deficiency E53.8    DDD (degenerative disc disease), lumbar M51.36    Erectile dysfunction N52.9    Spinal stenosis of lumbar region with radiculopathy M48.06, M54.16    Hereditary peripheral neuropathy G60.9    Aortoiliac occlusive disease ([address] I74.09    Atherosclerosis of native artery of both lower extremities with intermittent claudication ([address] I70.213    Peripheral artery disease ([address] I73.9    Coronary artery disease involving native coronary artery of native heart I25.10    Chronic atrial fibrillation (HCC) I48.2    Transient alteration of awareness R40.4    Acute blood loss as cause of postoperative anemia D62    Impaired mobility and ADLs Z74.09    Anticoagulated on Coumadin Z51.81, Z79.01    Ischemic cardiomyopathy I25.5    Chronic systolic heart failure (HCC) I50.22    Carotid artery disease (HCC) I77.9    Hyperammonemia (HCC) E72.20    Dyslipidemia E78.5    Hyperuricemia E79.0    Euthyroid sick syndrome E07.81    Aftercare following surgery of the circulatory system Z48.812    Status post femoral-popliteal bypass surgery Z95.828    History of cardioversion Z98.890    Critical ischemia of lower extremity I99.8    Chronic ulcer of right foot ([facility]) L97.519    Vitamin D deficiency E55.9       Current Outpatient Prescriptions   Medication Sig Dispense Refill    aspirin 81 mg chewable tablet Take 1 Tab by mouth daily (with breakfast). 90 Tab 3    cholecalciferol (VITAMIN D3) 1,000 unit tablet Take 1 Tab by mouth daily. 90 Tab 3    losartan (COZAAR) 25 mg tablet Take 1 Tab by mouth daily. Indications: Chronic Heart Failure, hypertension 90 Tab 2    ascorbic acid, vitamin C, (VITAMIN C) 250 mg tablet Take 1 Tab by mouth daily (with breakfast). 90 Tab 2    DULoxetine (CYMBALTA) 60 mg capsule Take 1 Cap by mouth daily. 90 Cap 2    zinc sulfate (ZINCATE) 220 (50) mg capsule Take 1 Cap by mouth daily. 90 Cap 0    ferrous sulfate 325 mg (65 mg iron) tablet Take 1 Tab by mouth daily (with breakfast). 90 Tab 3    atorvastatin (LIPITOR) 40 mg tablet Take 1 Tab by mouth nightly. Indications: DYSLIPIDEMIA 90 Tab 3    dilTIAZem (CARDIZEM) 30 mg tablet Take 1 Tab by mouth Before breakfast, lunch, dinner and at bedtime. Indications: hypertension 120 Tab 0    amiodarone (CORDARONE) 200 mg tablet Take 1 Tab by mouth two (2) times a day. Start taking in the morning of 5/3/17  Indications: VENTRICULAR RATE CONTROL IN ATRIAL FIBRILLATION 60 Tab 0    clopidogrel (PLAVIX) 75 mg tab Take 1 Tab by mouth daily (with dinner). 30 Tab 0    gabapentin (NEURONTIN) 100 mg capsule Take 1 Cap by mouth two (2) times a day. Indications: NEUROPATHIC PAIN 60 Cap 0    metoprolol tartrate (LOPRESSOR) 25 mg tablet Take 0.5 Tabs by mouth every twelve (12) hours. Indications: hypertension, VENTRICULAR RATE CONTROL IN ATRIAL FIBRILLATION 30 Tab 0    warfarin (COUMADIN) 2.5 mg tablet 2.5 mg po daily through 5/1/17 and then as per [facility] (Patient taking differently: Take 2.5 mg by mouth daily. TAKE COUMADIN 2.5 MG DAILY. CHECK PROTIME ON 5/29/17) 30 Tab 0    cyanocobalamin (VITAMIN B-12) 1,000 mcg tablet Take 1 Tab by mouth daily. 90 Tab 3    OTHER Check PT, INR on 5/1/17- results to Cardiologist immediately. Diagnosis- A Fib 1 Each 0    OTHER Check CBC, CMP, Mg on 5/1/17. Results to PCP immediately. Diagnosis- PAD 1 Each 0       No Known Allergies    Past Medical History:   Diagnosis Date    Acute blood loss as cause of postoperative anemia 4/4/2017    Anticoagulated on Coumadin     Aortoiliac occlusive disease (HCC) 1/25/2017    Atherosclerosis of native artery of both lower extremities with intermittent claudication ([facility] 1/25/2017    Benign hypertensive heart disease with systolic congestive heart failure, NYHA class 2 ([facility] 1/26/2015    Carotid artery disease (HCC)     Chronic atrial fibrillation (HCC)     Chronic systolic heart failure (HCC)     Chronic ulcer of right foot ([facility] 4/4/2017    Coronary artery disease involving native coronary artery of native heart 3/15/2017    Successful stenting of Cx (Xience LESLEY) and RCA (Xience LESLEY) to 0% by Dr. Elisabeth ePttit on 3/15/17.  DDD (degenerative disc disease), lumbar 1/26/2015    Dyslipidemia     Erectile dysfunction 7/5/2016    Euthyroid sick syndrome 4/6/2017    Hereditary peripheral neuropathy 11/15/2016    History of cardioversion 4/18/2017    S/P Synchronized external cardioversion (4/18/2017 - Dr. Dominguez Wayne)    Hyperuricemia     Ischemic cardiomyopathy     Peripheral artery disease ([facility] 1/25/2017    Spinal stenosis of lumbar region with radiculopathy 5/4/2015    Dr. Kd Moy Vitamin D deficiency 4/22/2017    Vitamin D 25-Hydroxy (4/22/2017) = 12.0       Social History     Social History    Marital status: LEGALLY      Spouse name: N/A    Number of children: N/A    Years of education: N/A     Occupational History    Not on file.      Social History Main Topics    Smoking status: Former Smoker    Smokeless tobacco: Never Used Comment: quit in 2011    Alcohol use 1.2 oz/week     2 Cans of beer per week      Comment: 10 cans of beer a month    Drug use: Yes     Special: Marijuana      Comment: occasionally    Sexual activity: Yes     Partners: Female     Other Topics Concern    Not on file     Social History Narrative       Family History   Problem Relation Age of Onset    Heart Disease Father          OBJECTIVE    Physical Exam:     Visit Vitals    /68 (BP 1 Location: Left arm, BP Patient Position: Sitting)    Pulse (!) 56    Temp 97.8 °F (36.6 °C) (Oral)    Resp 16    Ht 5' 10\" (1.778 m)    Wt 160 lb (72.6 kg)    SpO2 99%    BMI 22.96 kg/m2       General: alert, pale, chronically ill appearing  Head: atraumatic. Non-tender maxillary and frontal sinuses  Neck: supple, no adenopathy palpated  CVS: normal rate, regular rhythm, distinct S1 and S2  Lungs:clear to ausculation bilaterally, no crackles, wheezing or rhonchi noted  Abdomen: normoactive bowel sounds, soft, non-tender  Extremities: R foot with dressing  Skin: warm, no lesions, rashes noted  Psych:  mood and affect normal        ASSESSMENT/PLAN  Mariza Sweeney was seen today for hypertension, cholesterol problem, coronary artery disease, anemia and surgical follow-up. Diagnoses and all orders for this visit:    Essential hypertension  Controlled, cont cozaar  Vitamin D deficiency  Improved, now wnl, cont 1000 iu daily    Peripheral artery disease (HCC)  Improving, along with foot ulcers, following dr. Kelli Paz podiatry  On plavix, ASA/statin, cymbalta for pain.      Coronary artery disease involving native coronary artery of native heart without angina pectoris  S/p angioplasty, following dr douglas    Chronic  afib   currently sinus, antiocoagulated on coumadin  cards considering decreaseing amiodarone dose, will follow TSH closely    Dyslipidemia  On lipitor, LDL goal <70  Acute blood loss as cause of postoperative anemia  Improving, cont po fe  Recheck in 6 weeks  -     CBC WITH AUTOMATED DIFF; Future    Elevated TSH  Recheck TSH cascade in 6 weeks    Other orders  -     aspirin 81 mg chewable tablet; Take 1 Tab by mouth daily (with breakfast). -     cholecalciferol (VITAMIN D3) 1,000 unit tablet; Take 1 Tab by mouth daily. -     losartan (COZAAR) 25 mg tablet; Take 1 Tab by mouth daily. Indications: Chronic Heart Failure, hypertension  -     ascorbic acid, vitamin C, (VITAMIN C) 250 mg tablet; Take 1 Tab by mouth daily (with breakfast). -     DULoxetine (CYMBALTA) 60 mg capsule; Take 1 Cap by mouth daily. -     zinc sulfate (ZINCATE) 220 (50) mg capsule; Take 1 Cap by mouth daily. -     ferrous sulfate 325 mg (65 mg iron) tablet; Take 1 Tab by mouth daily (with breakfast). -     atorvastatin (LIPITOR) 40 mg tablet; Take 1 Tab by mouth nightly. Indications: DYSLIPIDEMIA        Complicated post op course, requiring 3 surgical intervention for PAD    Follow-up Disposition:  Return in about 6 weeks (around 7/14/2017), or if symptoms worsen or fail to improve. Patient understands plan of care. Patient has provided input and agrees with goals.

## 2017-06-02 NOTE — TELEPHONE ENCOUNTER
Patient requiring pain medication due to leg pain which is improving but still having some discomfort, verbal order from Dr. Blanka Browning for Percocet 5-325 mg, take every 6 hours, quantity 40.

## 2017-06-02 NOTE — TELEPHONE ENCOUNTER
Patient is needing refills on Amiodarone 200mg BID & Plavix 75mg daily. Here at Dr. Marcela Siu office for follow-up appointment. Thank you!

## 2017-06-02 NOTE — MR AVS SNAPSHOT
Visit Information Date & Time Provider Department Dept. Phone Encounter #  
 6/2/2017 10:00 AM Michelle Nettles, [address] [id number] Follow-up Instructions Return in about 6 weeks (around 7/14/2017), or if symptoms worsen or fail to improve. Your Appointments 6/7/2017  9:00 AM  
PROCEDURE with [facility] IMAGING 2 [facility] ([facility]) Appt Note: [place] [address] [address] 713 200 [facility]  
[phone] [address] [facility]  
  
    
 6/7/2017 10:00 AM  
PROCEDURE with [facility] IMAGING 2 [facility] ([facility]) Appt Note: FEM FEM [facility] POP [facility]  
 [address] [address] 200 [facility]  
[phone]  
  
    
 6/7/2017 11:00 AM  
PROCEDURE with [facility] NONIMAGING [facility] ([facility]) Appt Note: MARINA [place] [address] [address] 444 200 [facility]  
[phone] formerly [facility] [address]  
  
    
 6/7/2017  1:45 PM  
Office Visit with Tita Manzano MD  
[address] [place] [address] [facility]) Appt Note: OV SAME DAY [place] [address] [address] [facility] 25 272 200 [facility]  
[phone] [address] [place] [address]  
  
    
 6/15/2017  9:20 AM  
Follow Up with Chaim Krueger MD  
[facility] ([facility]) Appt Note: 1 mth f/up [place] [address] [address] [address] [phone] formerly [facility] [address] 6/19/2017  9:30 AM  
ROUTINE CARE with Michelle Nettles MD  
[facility] ([facility]) Appt Note: 3 month followup [address] [place] [address] [address] 200 [facility]  
[phone] [address] Upcoming Health Maintenance Date Due Pneumococcal 19-64 Medium Risk (1 of 1 - PPSV23) 3/22/1979 INFLUENZA AGE 9 TO ADULT 8/1/2017 COLONOSCOPY 7/23/2020 DTaP/Tdap/Td series (2 - Td) 7/5/2026 Allergies as of 6/2/2017  Review Complete On: 6/2/2017 By: Walter Peace MD  
 No Known Allergies Current Immunizations  Reviewed on 7/5/2016 Name Date Tdap 7/5/2016 Not reviewed this visit You Were Diagnosed With   
  
 Codes Comments Essential hypertension    -  Primary ICD-10-CM: I10 
ICD-9-CM: 401.9 Vitamin D deficiency     ICD-10-CM: E55.9 ICD-9-CM: 268.9 Peripheral artery disease ([facility] [address]     ICD-10-CM: I73.9 ICD-9-CM: 443.9 Coronary artery disease involving native coronary artery of native heart without angina pectoris     ICD-10-CM: I25.10 ICD-9-CM: 414.01 Dyslipidemia     ICD-10-CM: E78.5 ICD-9-CM: 272.4 Acute blood loss as cause of postoperative anemia     ICD-10-CM: D62 
ICD-9-CM: 285.1 Elevated TSH     ICD-10-CM: R94.6 ICD-9-CM: 794.5 Vitals BP Pulse Temp Resp Height(growth percentile) Weight(growth percentile) 128/68 (BP 1 Location: Left arm, BP Patient Position: Sitting) (!) 56 97.8 °F (36.6 °C) (Oral) 16 5' 10\" (1.778 m) 160 lb (72.6 kg) SpO2 BMI Smoking Status 99% 22.96 kg/m2 Former Smoker BMI and BSA Data Body Mass Index Body Surface Area  
 22.96 kg/m 2 1.89 m 2 Preferred Pharmacy Pharmacy Name Phone [address] [facility], [address] Your Updated Medication List  
  
   
This list is accurate as of: 6/2/17 10:23 AM.  Always use your most recent med list.  
  
  
  
  
 amiodarone 200 mg tablet Commonly known as:  CORDARONE Take 1 Tab by mouth two (2) times a day.  Start taking in the morning of 5/3/17  Indications: VENTRICULAR RATE CONTROL IN ATRIAL FIBRILLATION  
  
 ascorbic acid (vitamin C) 250 mg tablet Commonly known as:  VITAMIN C Take 1 Tab by mouth daily (with breakfast). aspirin 81 mg chewable tablet Take 1 Tab by mouth daily (with breakfast). atorvastatin 40 mg tablet Commonly known as:  LIPITOR Take 1 Tab by mouth nightly. Indications: DYSLIPIDEMIA  
  
 cholecalciferol 1,000 unit tablet Commonly known as:  VITAMIN D3 Take 1 Tab by mouth daily. clopidogrel 75 mg Tab Commonly known as:  PLAVIX Take 1 Tab by mouth daily (with dinner). cyanocobalamin 1,000 mcg tablet Commonly known as:  VITAMIN B-12 Take 1 Tab by mouth daily. dilTIAZem 30 mg tablet Commonly known as:  CARDIZEM Take 1 Tab by mouth Before breakfast, lunch, dinner and at bedtime. Indications: hypertension DULoxetine 60 mg capsule Commonly known as:  CYMBALTA Take 1 Cap by mouth daily. ferrous sulfate 325 mg (65 mg iron) tablet Take 1 Tab by mouth daily (with breakfast). gabapentin 100 mg capsule Commonly known as:  NEURONTIN Take 1 Cap by mouth two (2) times a day. Indications: NEUROPATHIC PAIN  
  
 losartan 25 mg tablet Commonly known as:  COZAAR Take 1 Tab by mouth daily. Indications: Chronic Heart Failure, hypertension  
  
 metoprolol tartrate 25 mg tablet Commonly known as:  LOPRESSOR Take 0.5 Tabs by mouth every twelve (12) hours. Indications: hypertension, VENTRICULAR RATE CONTROL IN ATRIAL FIBRILLATION  
  
 * OTHER Check PT, INR on 5/1/17- results to Cardiologist immediately. Diagnosis- A Fib  
  
 * OTHER Check CBC, CMP, Mg on 5/1/17. Results to PCP immediately. Diagnosis- PAD  
  
 warfarin 2.5 mg tablet Commonly known as:  COUMADIN  
2.5 mg po daily through 5/1/17 and then as per [facility]  
  
 zinc sulfate 220 (50) mg capsule Commonly known as:  ZINCATE Take 1 Cap by mouth daily. * Notice: This list has 2 medication(s) that are the same as other medications prescribed for you. Read the directions carefully, and ask your doctor or other care provider to review them with you. Prescriptions Sent to Pharmacy Refills  
 aspirin 81 mg chewable tablet 3 Sig: Take 1 Tab by mouth daily (with breakfast). Class: Normal  
 Pharmacy: [address] [place] [address] Ph #: [phone] Route: Oral  
 cholecalciferol (VITAMIN D3) 1,000 unit tablet 3 Sig: Take 1 Tab by mouth daily. Class: Normal  
 Pharmacy: [address] [place] [address] Ph #: [phone] Route: Oral  
 losartan (COZAAR) 25 mg tablet 2 Sig: Take 1 Tab by mouth daily. Indications: Chronic Heart Failure, hypertension Class: Normal  
 Pharmacy: [address] [place] [address] Ph #: [phone] Route: Oral  
 ascorbic acid, vitamin C, (VITAMIN C) 250 mg tablet 2 Sig: Take 1 Tab by mouth daily (with breakfast). Class: Normal  
 Pharmacy: [address] [place] [address] Ph #: [phone] Route: Oral  
 DULoxetine (CYMBALTA) 60 mg capsule 2 Sig: Take 1 Cap by mouth daily. Class: Normal  
 Pharmacy: [address] [place] [address] Ph #: [phone] Route: Oral  
 zinc sulfate (ZINCATE) 220 (50) mg capsule 0 Sig: Take 1 Cap by mouth daily. Class: Normal  
 Pharmacy: [address] [place] [address] Ph #: [phone] Route: Oral  
 ferrous sulfate 325 mg (65 mg iron) tablet 3 Sig: Take 1 Tab by mouth daily (with breakfast). Class: Normal  
 Pharmacy: [address] [place] [address] Ph #: [phone] Route: Oral  
 atorvastatin (LIPITOR) 40 mg tablet 3 Sig: Take 1 Tab by mouth nightly. Indications: DYSLIPIDEMIA  Class: Normal  
 Pharmacy: [address] [facility], [address] [facility] Ph #: [phone] Route: Oral  
  
Follow-up Instructions Return in about 6 weeks (around 7/14/2017), or if symptoms worsen or fail to improve. To-Do List   
 06/02/2017 Lab:  CBC WITH AUTOMATED DIFF   
  
 06/05/2017 To Be Determined Appointment with Dago Martino RN at [address] [place] [address] [facility]  
  
 06/07/2017 To Be Determined Appointment with Dago Martino RN at [address] [place] [address] MED CTR  
  
 06/09/2017 To Be Determined Appointment with Dago Martino RN at [address] [place] [address] MED CTR  
  
 06/12/2017 To Be Determined Appointment with Dago Martino RN at [address] [place] [address] MED CTR  
  
 06/14/2017 To Be Determined Appointment with Dago Martino RN at [address] [place] [address] MED CTR  
  
 06/16/2017 To Be Determined Appointment with Dago Martino RN at [address] [place] [address] MED CTR  
  
 06/19/2017 To Be Determined Appointment with Dago Martino RN at [address] [place] [address] MED [facility]  
  
 06/21/2017 To Be Determined Appointment with Dago Martino RN at [address] [place] [address] MED CTR  
  
 06/23/2017 To Be Determined Appointment with Dago Martino RN at [address] [place] [address] MED CTR  
  
 06/26/2017 To Be Determined Appointment with Dago Martino RN at [address] [place] [address] MED CTR  
  
 06/28/2017 To Be Determined Appointment with Dago Martino RN at [address] [place] [address] Patient Instructions Peripheral Arterial Disease of the Leg: Care Instructions Your Care Instructions Peripheral arterial disease (PAD) occurs when the blood vessels (arteries) that supply blood to the legs, belly, pelvis, arms, or neck get too narrow. This reduces blood flow to that area. The legs are affected most often. Fatty buildup (plaque) in the arteries usually is the cause of PAD. This buildup is also called \"hardening\" of the arteries. Your risk of PAD increases if you smoke or have high cholesterol, high blood pressure, diabetes, or a family history of PAD. One of the main symptoms of PAD is intermittent claudication. This is a tight, aching, or squeezing pain in the calf, foot, thigh, or buttock that occurs during exercise. The pain usually gets worse during exercise and goes away when you rest. But as PAD gets worse, you may feel pain even at rest. 
Medicines and lifestyle changes may help your symptoms and lower your risk of heart attack and stroke. In some cases, surgery or other treatment is needed. It is important that you follow up with your doctor. Follow-up care is a key part of your treatment and safety. Be sure to make and go to all appointments, and call your doctor if you are having problems. It's also a good idea to know your test results and keep a list of the medicines you take. How can you care for yourself at home? · Do not smoke. Smoking can make PAD worse. If you need help quitting, talk to your doctor about stop-smoking programs and medicines. These can increase your chances of quitting for good. · Take your medicines exactly as prescribed. Call your doctor if you think you are having a problem with your medicine. · If you take a blood thinner, such as aspirin, be sure to get instructions about how to take your medicine safely. Blood thinners can cause serious bleeding problems. · Ask your doctor if a cardiac rehab program is right for you. Cardiac rehab can help you make lifestyle changes.  In cardiac rehab, a team of health professionals provides education and support to help you make new, healthy habits. · Eat heart-healthy foods such as fruits, vegetables, whole grains, fish, lean meats, and low-fat or nonfat dairy foods. Limit sodium, sugar, and alcohol. · If your doctor recommends it, get more exercise. Walking is a good choice. Bit by bit, increase the amount you walk every day. Try for at least 30 minutes on most days of the week. · Stay at a healthy weight. Lose weight if you need to. · Take good care of your feet. ¨ Treat cuts and scrapes on your legs right away. Poor blood flow prevents (or slows) quick healing of even small cuts or scrapes. This is even more important if you have diabetes. ¨ Avoid shoes that are too tight or that rub your feet. Shoes should be comfortable and fit well. ¨ Avoid socks or stockings that are tight enough to leave elastic-band marks on your legs. Tight socks can make circulation problems worse. ¨ Keep your feet clean and moisturized to prevent drying and cracking. Place cotton or lamb's wool between your toes to prevent rubbing and to absorb moisture. ¨ If you have a sore on your leg or foot, keep it dry and cover it with a nonstick bandage until you see your doctor. When should you call for help? Call 911 anytime you think you may need emergency care. For example, call if: 
· You have symptoms of a heart attack. These may include: ¨ Chest pain or pressure, or a strange feeling in the chest. 
¨ Sweating. ¨ Shortness of breath. ¨ Nausea or vomiting. ¨ Pain, pressure, or a strange feeling in the back, neck, jaw, or upper belly or in one or both shoulders or arms. ¨ Lightheadedness or sudden weakness. ¨ A fast or irregular heartbeat. After you call 911, the  may tell you to chew 1 adult-strength or 2 to 4 low-dose aspirin. Wait for an ambulance. Do not try to drive yourself. · You have sudden, severe leg pain, and your leg is cool and pale. · You have symptoms of a stroke. These may include: 
¨ Sudden numbness, tingling, weakness, or loss of movement in your face, arm, or leg, especially on only one side of your body. ¨ Sudden vision changes. ¨ Sudden trouble speaking. ¨ Sudden confusion or trouble understanding simple statements. ¨ Sudden problems with walking or balance. ¨ A sudden, severe headache that is different from past headaches. Call your doctor now or seek immediate medical care if: 
· You have leg pain that does not go away even if you rest. 
· Your leg pain changes or gets worse. For example, if you have more pain with normal activity or the same pain with decreased activity, you should call. · You have an open sore on your leg or foot that is infected. Signs of infection include: 
¨ Increased pain, swelling, warmth, or redness. ¨ Red streaks leading from the sore. ¨ Pus draining from the sore. ¨ A fever. Watch closely for changes in your health, and be sure to contact your doctor if you have any problems. Where can you learn more? Go to http://yuniel-lyndsay.info/. Enter 056 [id number] 63 [id number] in the search box to learn more about \"Peripheral Arterial Disease of the Leg: Care Instructions. \" Current as of: July 19, 2016 Content Version: 11.2 © 3990-8218 [employer]. Care instructions adapted under license by [employer] (which disclaims liability or warranty for this information). If you have questions about a medical condition or this instruction, always ask your healthcare professional. [place] [address] any warranty or liability for your use of this information. Please provide this summary of care documentation to your next provider. Your primary care clinician is listed as Walter Peace. If you have any questions after today's visit, please call [phone].

## 2017-06-02 NOTE — PATIENT INSTRUCTIONS
Peripheral Arterial Disease of the Leg: Care Instructions  Your Care Instructions  Peripheral arterial disease (PAD) occurs when the blood vessels (arteries) that supply blood to the legs, belly, pelvis, arms, or neck get too narrow. This reduces blood flow to that area. The legs are affected most often. Fatty buildup (plaque) in the arteries usually is the cause of PAD. This buildup is also called \"hardening\" of the arteries. Your risk of PAD increases if you smoke or have high cholesterol, high blood pressure, diabetes, or a family history of PAD. One of the main symptoms of PAD is intermittent claudication. This is a tight, aching, or squeezing pain in the calf, foot, thigh, or buttock that occurs during exercise. The pain usually gets worse during exercise and goes away when you rest. But as PAD gets worse, you may feel pain even at rest.  Medicines and lifestyle changes may help your symptoms and lower your risk of heart attack and stroke. In some cases, surgery or other treatment is needed. It is important that you follow up with your doctor. Follow-up care is a key part of your treatment and safety. Be sure to make and go to all appointments, and call your doctor if you are having problems. It's also a good idea to know your test results and keep a list of the medicines you take. How can you care for yourself at home? · Do not smoke. Smoking can make PAD worse. If you need help quitting, talk to your doctor about stop-smoking programs and medicines. These can increase your chances of quitting for good. · Take your medicines exactly as prescribed. Call your doctor if you think you are having a problem with your medicine. · If you take a blood thinner, such as aspirin, be sure to get instructions about how to take your medicine safely. Blood thinners can cause serious bleeding problems. · Ask your doctor if a cardiac rehab program is right for you. Cardiac rehab can help you make lifestyle changes. In cardiac rehab, a team of health professionals provides education and support to help you make new, healthy habits. · Eat heart-healthy foods such as fruits, vegetables, whole grains, fish, lean meats, and low-fat or nonfat dairy foods. Limit sodium, sugar, and alcohol. · If your doctor recommends it, get more exercise. Walking is a good choice. Bit by bit, increase the amount you walk every day. Try for at least 30 minutes on most days of the week. · Stay at a healthy weight. Lose weight if you need to. · Take good care of your feet. ¨ Treat cuts and scrapes on your legs right away. Poor blood flow prevents (or slows) quick healing of even small cuts or scrapes. This is even more important if you have diabetes. ¨ Avoid shoes that are too tight or that rub your feet. Shoes should be comfortable and fit well. ¨ Avoid socks or stockings that are tight enough to leave elastic-band marks on your legs. Tight socks can make circulation problems worse. ¨ Keep your feet clean and moisturized to prevent drying and cracking. Place cotton or lamb's wool between your toes to prevent rubbing and to absorb moisture. ¨ If you have a sore on your leg or foot, keep it dry and cover it with a nonstick bandage until you see your doctor. When should you call for help? Call 911 anytime you think you may need emergency care. For example, call if:  · You have symptoms of a heart attack. These may include:  ¨ Chest pain or pressure, or a strange feeling in the chest.  ¨ Sweating. ¨ Shortness of breath. ¨ Nausea or vomiting. ¨ Pain, pressure, or a strange feeling in the back, neck, jaw, or upper belly or in one or both shoulders or arms. ¨ Lightheadedness or sudden weakness. ¨ A fast or irregular heartbeat. After you call 911, the  may tell you to chew 1 adult-strength or 2 to 4 low-dose aspirin. Wait for an ambulance. Do not try to drive yourself.   · You have sudden, severe leg pain, and your leg is cool and pale.  · You have symptoms of a stroke. These may include:  ¨ Sudden numbness, tingling, weakness, or loss of movement in your face, arm, or leg, especially on only one side of your body. ¨ Sudden vision changes. ¨ Sudden trouble speaking. ¨ Sudden confusion or trouble understanding simple statements. ¨ Sudden problems with walking or balance. ¨ A sudden, severe headache that is different from past headaches. Call your doctor now or seek immediate medical care if:  · You have leg pain that does not go away even if you rest.  · Your leg pain changes or gets worse. For example, if you have more pain with normal activity or the same pain with decreased activity, you should call. · You have an open sore on your leg or foot that is infected. Signs of infection include:  ¨ Increased pain, swelling, warmth, or redness. ¨ Red streaks leading from the sore. ¨ Pus draining from the sore. ¨ A fever. Watch closely for changes in your health, and be sure to contact your doctor if you have any problems. Where can you learn more? Go to http://yuniel-lyndsay.info/. Enter [id number] [id number] in the search box to learn more about \"Peripheral Arterial Disease of the Leg: Care Instructions. \"  Current as of: July 19, 2016  Content Version: 11.2  © 4005-5067 [employer]. Care instructions adapted under license by [employer] (which disclaims liability or warranty for this information). If you have questions about a medical condition or this instruction, always ask your healthcare professional. [place] [address] any warranty or liability for your use of this information.

## 2017-06-02 NOTE — PROGRESS NOTES
Joana Adams is a 59 y.o. male    Chief Complaint   Patient presents with    Hypertension    Cholesterol Problem    Coronary Artery Disease    Anemia    Surgical Follow-up       1. Have you been to the ER, urgent care clinic or hospitalized since your last visit? NO.     2. Have you seen or consulted any other health care providers outside of the [facility] since your last visit (Include any pap smears or colon screening)?  NO  Learning Assessment 2/27/2017   PRIMARY LEARNER Patient   HIGHEST LEVEL OF EDUCATION - PRIMARY LEARNER  -   BARRIERS PRIMARY LEARNER -   CO-LEARNER CAREGIVER -   PRIMARY LANGUAGE ENGLISH    NEED -   LEARNER PREFERENCE PRIMARY LISTENING   ANSWERED BY patient   RELATIONSHIP SELF

## 2017-06-05 ENCOUNTER — HOME CARE VISIT (OUTPATIENT)
Dept: SCHEDULING | Facility: HOME HEALTH | Age: 57
End: 2017-06-05
Payer: COMMERCIAL

## 2017-06-05 ENCOUNTER — HOME CARE VISIT (OUTPATIENT)
Dept: HOME HEALTH SERVICES | Facility: HOME HEALTH | Age: 57
End: 2017-06-05
Payer: COMMERCIAL

## 2017-06-05 ENCOUNTER — TELEPHONE ANTICOAG (OUTPATIENT)
Dept: CARDIOLOGY CLINIC | Age: 57
End: 2017-06-05

## 2017-06-05 VITALS
SYSTOLIC BLOOD PRESSURE: 119 MMHG | TEMPERATURE: 99 F | HEART RATE: 53 BPM | DIASTOLIC BLOOD PRESSURE: 63 MMHG | RESPIRATION RATE: 20 BRPM | OXYGEN SATURATION: 98 %

## 2017-06-05 DIAGNOSIS — I48.20 CHRONIC ATRIAL FIBRILLATION (HCC): ICD-10-CM

## 2017-06-05 DIAGNOSIS — Z79.01 ANTICOAGULATED ON COUMADIN: ICD-10-CM

## 2017-06-05 LAB — INR, EXTERNAL: 1.7

## 2017-06-05 PROCEDURE — G0299 HHS/HOSPICE OF RN EA 15 MIN: HCPCS

## 2017-06-05 NOTE — PROGRESS NOTES
Verbal order and read back per Joana Ley NP  The INR is below the therapeutic range. Please make the following adjustments to Coumadin dosing: Increase Coumadin to 2.5 mg daily except 3.75 mg Mon and Wed  Repeat the INR in 1 week.   NKECHI Banks informed of instructions, read back and verbalized understanding Gabi Guo LPN

## 2017-06-06 VITALS
OXYGEN SATURATION: 98 % | DIASTOLIC BLOOD PRESSURE: 71 MMHG | SYSTOLIC BLOOD PRESSURE: 124 MMHG | HEART RATE: 57 BPM | RESPIRATION RATE: 20 BRPM | TEMPERATURE: 98.3 F

## 2017-06-07 ENCOUNTER — OFFICE VISIT (OUTPATIENT)
Dept: VASCULAR SURGERY | Age: 57
End: 2017-06-07

## 2017-06-07 ENCOUNTER — HOME CARE VISIT (OUTPATIENT)
Dept: SCHEDULING | Facility: HOME HEALTH | Age: 57
End: 2017-06-07
Payer: COMMERCIAL

## 2017-06-07 ENCOUNTER — HOSPITAL ENCOUNTER (OUTPATIENT)
Dept: LAB | Age: 57
Discharge: HOME | End: 2017-06-07

## 2017-06-07 VITALS
HEART RATE: 68 BPM | HEIGHT: 70 IN | DIASTOLIC BLOOD PRESSURE: 70 MMHG | BODY MASS INDEX: 22.9 KG/M2 | WEIGHT: 160 LBS | RESPIRATION RATE: 18 BRPM | SYSTOLIC BLOOD PRESSURE: 132 MMHG

## 2017-06-07 DIAGNOSIS — I74.09 AORTOILIAC OCCLUSIVE DISEASE (HCC): Chronic | ICD-10-CM

## 2017-06-07 DIAGNOSIS — I73.9 PVD (PERIPHERAL VASCULAR DISEASE) (HCC): Primary | ICD-10-CM

## 2017-06-07 DIAGNOSIS — I70.213 ATHEROSCLEROSIS OF NATIVE ARTERY OF BOTH LOWER EXTREMITIES WITH INTERMITTENT CLAUDICATION (HCC): Chronic | ICD-10-CM

## 2017-06-07 DIAGNOSIS — I73.9 PERIPHERAL ARTERY DISEASE (HCC): Chronic | ICD-10-CM

## 2017-06-07 PROCEDURE — 99001 SPECIMEN HANDLING PT-LAB: CPT | Performed by: SURGERY

## 2017-06-07 NOTE — MR AVS SNAPSHOT
Visit Information Date & Time Provider Department Dept. Phone Encounter #  
 6/7/2017  1:45 PM Kalyani Ervin  [facility]  Follow-up Instructions Follow-up and Disposition History Your Appointments 6/7/2017  1:45 PM  
Office Visit with Kalyani Ervin MD  
[facility] Appt Note: OV SAME DAY PREP; cld pt to confirm appt, no answer left a generic message with appt time/date 1212 [facility] 275 200 [facility]  
[phone] 1212 [facility] 47 [facility]  
  
    
 6/15/2017  9:20 AM  
Follow Up with Selam Flores MD  
[facility] ([facility]) Appt Note: 1 mth f/up [place] [address] [address] [address] [phone] 1212 [facility] [address] 7/14/2017  9:45 AM  
ROUTINE CARE with Tatum Booker MD  
[facility] ([facility]) Appt Note: routine f/u 6wks [address] 200 [facility]  
[address] [facility] 200 [facility] Upcoming Health Maintenance Date Due Pneumococcal 19-64 Medium Risk (1 of 1 - PPSV23) 3/22/1977 INFLUENZA AGE 9 TO ADULT 8/1/2017 COLONOSCOPY 7/23/2020 DTaP/Tdap/Td series (2 - Td) 7/5/2026 Allergies as of 6/7/2017  Review Complete On: 6/7/2017 By: Kalyani Ervin MD  
 No Known Allergies Current Immunizations  Reviewed on 7/5/2016 Name Date Tdap 7/5/2016 Not reviewed this visit You Were Diagnosed With   
  
 Codes Comments Aortoiliac occlusive disease (HCC)     ICD-10-CM: I74.09 
ICD-9-CM: 444.09 Atherosclerosis of native artery of both lower extremities with intermittent claudication ([facility]     ICD-10-CM: P79.342 ICD-9-CM: 440.21   
 Peripheral artery disease ([facility]     ICD-10-CM: I73.9 ICD-9-CM: 443. 9 Vitals BP Pulse Resp Height(growth percentile) Weight(growth percentile) BMI  
 132/70 (BP 1 Location: Left arm, BP Patient Position: Sitting) 68 18 5' 10\" (1.778 m) 160 lb (72.6 kg) 22.96 kg/m2 Smoking Status Former Smoker BMI and BSA Data Body Mass Index Body Surface Area  
 22.96 kg/m 2 1.89 m 2 Preferred Pharmacy Pharmacy Name Phone 1387 [facility], [address] Your Updated Medication List  
  
   
This list is accurate as of: 6/7/17 11:21 AM.  Always use your most recent med list.  
  
  
  
  
 amiodarone 200 mg tablet Commonly known as:  CORDARONE Take 1 Tab by mouth two (2) times a day. Start taking in the morning of 5/3/17  Indications: VENTRICULAR RATE CONTROL IN ATRIAL FIBRILLATION  
  
 ascorbic acid (vitamin C) 250 mg tablet Commonly known as:  VITAMIN C Take 1 Tab by mouth daily (with breakfast). aspirin 81 mg chewable tablet Take 1 Tab by mouth daily (with breakfast). atorvastatin 40 mg tablet Commonly known as:  LIPITOR Take 1 Tab by mouth nightly. Indications: DYSLIPIDEMIA  
  
 cholecalciferol 1,000 unit tablet Commonly known as:  VITAMIN D3 Take 1 Tab by mouth daily. clopidogrel 75 mg Tab Commonly known as:  PLAVIX Take 1 Tab by mouth daily (with dinner). cyanocobalamin 1,000 mcg tablet Commonly known as:  VITAMIN B-12 Take 1 Tab by mouth daily. dilTIAZem 30 mg tablet Commonly known as:  CARDIZEM Take 1 Tab by mouth Before breakfast, lunch, dinner and at bedtime. Indications: hypertension DULoxetine 60 mg capsule Commonly known as:  CYMBALTA Take 1 Cap by mouth daily. ferrous sulfate 325 mg (65 mg iron) tablet Take 1 Tab by mouth daily (with breakfast). gabapentin 100 mg capsule Commonly known as:  NEURONTIN  
 Take 1 Cap by mouth two (2) times a day. Indications: NEUROPATHIC PAIN  
  
 losartan 25 mg tablet Commonly known as:  COZAAR Take 1 Tab by mouth daily. Indications: Chronic Heart Failure, hypertension  
  
 metoprolol tartrate 25 mg tablet Commonly known as:  LOPRESSOR Take 0.5 Tabs by mouth every twelve (12) hours. Indications: hypertension, VENTRICULAR RATE CONTROL IN ATRIAL FIBRILLATION  
  
 * OTHER Check PT, INR on 5/1/17- results to Cardiologist immediately. Diagnosis- A Fib  
  
 * OTHER Check CBC, CMP, Mg on 5/1/17. Results to PCP immediately. Diagnosis- PAD  
  
 oxyCODONE-acetaminophen 5-325 mg per tablet Commonly known as:  PERCOCET Take 1 Tab by mouth every six (6) hours as needed for Pain. Max Daily Amount: 4 Tabs. warfarin 2.5 mg tablet Commonly known as:  COUMADIN  
2.5 mg po daily except on Tuesday Take 3.75 mg and then as per [facility]  
  
 zinc sulfate 220 (50) mg capsule Commonly known as:  ZINCATE Take 1 Cap by mouth daily. * Notice: This list has 2 medication(s) that are the same as other medications prescribed for you. Read the directions carefully, and ask your doctor or other care provider to review them with you. To-Do List   
 06/09/2017 To Be Determined Appointment with Sully Padron RN at [address] [place] [address] [facility]  
  
 06/12/2017 To Be Determined Appointment with Sully Padron RN at [address] [place] [address] [facility]  
  
 06/14/2017 To Be Determined Appointment with Sully Padron RN at [address] [place] [address] [facility]  
  
 06/16/2017 To Be Determined Appointment with Sully Padron RN at [address] [place] [address] [facility]  
  
 06/19/2017 To Be Determined Appointment with Sully Padron RN at [address] [place] [address] [facility]  
  
 06/21/2017 To Be Determined Appointment with Lester Wyman RN at [address] [facility]  
  
 06/23/2017 To Be Determined Appointment with Lester Wyman RN at [address] [facility]  
  
 06/26/2017 To Be Determined Appointment with Lester Wyman RN at [address] [facility]  
  
 06/28/2017 To Be Determined Appointment with Lester Wyman RN at [address] Around 07/07/2017 Lab:  CBC W/O DIFF Around 07/07/2017 Lab:  METABOLIC PANEL, BASIC Please provide this summary of care documentation to your next provider. Your primary care clinician is listed as Ophelia Skiff. If you have any questions after today's visit, please call [phone].

## 2017-06-07 NOTE — PROCEDURES
[facility]   *** FINAL REPORT ***    Name: Alberto Thompson  MRN: [id number]       Outpatient  : 22 Mar 1960  HIS Order #: [id number]  [id number] [facility] Visit #: [id number]  Date: 2017    TYPE OF TEST: Peripheral Arterial Testing    REASON FOR TEST  Peripheral vascular dz NOS, Follow-up limb revasc    Right Leg  Segmentals: Abnormal                     mmHg  Brachial         130  High thigh  Low thigh  Calf  Posterior tibial  87  Dorsalis pedis    77  Peroneal  Metatarsal  Toe pressure      36  Ankle/Brachial: 0.60    Left Leg  Segmentals: Abnormal                     mmHg  Brachial         144  High thigh  Low thigh  Calf  Posterior tibial  71  Dorsalis pedis    71  Peroneal  Metatarsal  Toe pressure      33  Ankle/Brachial: 0.49    INTERPRETATION/FINDINGS  Physiologic testing was performed using continuous wave doppler and  segmental pressures. ANKLE/BRACHIAL INDEX:  1. Moderate peripheral arterial disease at rest in the right leg. History of right axillary to right common femoral, right common  femoral to popliteal and right to left femoral to femoral bypass  graft. 2. Severe peripheral arterial disease at rest in the left leg. 3. The right ankle/brachial index is 0.60 and the left ankle/brachial  index is 0.49.  4. The right digit/brachial index is 0.25 and the left digit/brachial  index is 0.23.  5. No prior study in this lab for comparison. ADDITIONAL COMMENTS    I have personally reviewed the data relevant to the interpretation of  this  study. TECHNOLOGIST: Martinez Hawkins RVT, RDMS  Signed: 2017 10:00 AM    PHYSICIAN: oDuglas Sánchez.  Starla Preciado MD  Signed: 2017 02:18 PM

## 2017-06-07 NOTE — PROCEDURES
[facility]   *** FINAL REPORT ***    Name: Laith Prieto  MRN: [id number]       Outpatient  : 22 Mar 1960  HIS Order #: [id number]  [id number] [facility] Visit #: [id number]  Date: 2017    TYPE OF TEST: Aorto-Iliac Duplex    REASON FOR TEST  Peripheral Arterial Disease    B-Mode:-                 (cm)   1     2     3  Aortic diameter:         AP:                           TV:  Common iliac diameter:   Right:                           Left:    Duplex:-                           PSV  Stenosis                           ----- --------------------  Aorta: (1)         (2)         (3)    Right common iliac:  Right external iliac:    Left common iliac:  Left external iliac:    INTERPRETATION/FINDINGS  Duplex images were obtained using 2-D gray scale, color flow and  spectral doppler analysis. 1. Patent right axillary to right common femoral artery bypass graft  with monophasic inflow and severe stenosis at the proximal  anastomosis. 2. The right MARINA is 0.60 in the moderate range and the left MARINA is  0.49 in the severe range. 3. No prior study for comparison. ADDITIONAL COMMENTS    I have personally reviewed the data relevant to the interpretation of  this  study. TECHNOLOGIST: Tulio Gongora RVT, RDMS  Signed: 2017 10:06 AM    PHYSICIAN: Zac Abad.  Rosales Lantigua MD  Signed: 2017 02:20 PM

## 2017-06-07 NOTE — PROGRESS NOTES
Donato Napier    Chief Complaint   Patient presents with    Leg Pain       History and Physical    Donato aNpier is a 59 y.o. male who is about 2 months status post right axillary to bifemoral bypass with right common femoral to above-knee popliteal artery bypass. Overall the patient states that his legs feel a lot better than before the procedure. He is no longer in any rest pain. 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 This is being followed by a podiatrist.  These are continuing to heal.  He does continue to have some numbness and tingling of bilateral lower extremities and some swelling worse on the right than the left. But overall is in good spirits and seems to be feeling a lot better. Past Medical History:   Diagnosis Date    Acute blood loss as cause of postoperative anemia 4/4/2017    Anticoagulated on Coumadin     Aortoiliac occlusive disease (HCC) 1/25/2017    Atherosclerosis of native artery of both lower extremities with intermittent claudication ([address] 1/25/2017    Benign hypertensive heart disease with systolic congestive heart failure, NYHA class 2 ([address] 1/26/2015    Carotid artery disease (HCC)     Chronic atrial fibrillation (HCC)     Chronic systolic heart failure (HCC)     Chronic ulcer of right foot ([address] 4/4/2017    Coronary artery disease involving native coronary artery of native heart 3/15/2017    Successful stenting of Cx (Xience LESLEY) and RCA (Xience LESLEY) to 0% by Dr. Perla Carter on 3/15/17.     DDD (degenerative disc disease), lumbar 1/26/2015    Dyslipidemia     Erectile dysfunction 7/5/2016    Euthyroid sick syndrome 4/6/2017    Hereditary peripheral neuropathy 11/15/2016    History of cardioversion 4/18/2017    S/P Synchronized external cardioversion (4/18/2017 - Dr. Jacob Hernandez)    Hyperuricemia     Ischemic cardiomyopathy     Peripheral artery disease ([address] 1/25/2017    Spinal stenosis of lumbar region with radiculopathy 5/4/2015    Dr. Debby Avila Vitamin D deficiency 4/22/2017    Vitamin D 25-Hydroxy (4/22/2017) = 12.0     Past Surgical History:   Procedure Laterality Date    CARDIAC CATHETERIZATION  3/8/2017         CARDIAC CATHETERIZATION  3/15/2017         CORONARY STENT SINGLE W/PTCA  3/15/2017         HX COLONOSCOPY  07/23/2015    polyps repeat 2020 5 years [facility]    HX FEMORAL BYPASS Right 04/04/2017    S/P Right axillary to bifemoral artery bypass using an 8 mm Propaten graft from the right axilla to the right common femoral artery with a jump femoral-femoral bypass with another 8 mm Propaten external ring bypass; Right common femoral artery, and profunda femoris artery and superficial femoral artery endarterectomy causing an extensive surgery on the right side (4/4/2017 - Dr. Jose De Jesus Faith)   [address] Right 04/07/2017    S/P Cutdown of femoral-femoral bypass, more on the left groin side; Right lower extremity angiography with first-order catheterization (4/7/2017 - Dr. Judson Way)    HX FEMORAL BYPASS Right 04/10/2017    S/P Right femoral to above-knee popliteal bypass with an 8 mm PTFE graft (4/10/2017 - Dr. Land)     Patient Active Problem List   Diagnosis Code    Benign hypertensive heart disease with systolic congestive heart failure, NYHA class 2 ([facility]) I11.0, I50.20    Vitamin B12 deficiency E53.8    DDD (degenerative disc disease), lumbar M51.36    Erectile dysfunction N52.9    Spinal stenosis of lumbar region with radiculopathy M48.06, M54.16    Hereditary peripheral neuropathy G60.9    Aortoiliac occlusive disease ([address] I74.09    Atherosclerosis of native artery of both lower extremities with intermittent claudication ([address] I70.213    Peripheral artery disease ([address] I73.9    Coronary artery disease involving native coronary artery of native heart I25.10    Chronic atrial fibrillation (HCC) I48.2    Transient alteration of awareness R40.4    Acute blood loss as cause of postoperative anemia D62    Impaired mobility and ADLs Z74.09    Anticoagulated on Coumadin Z51.81, Z79.01    Ischemic cardiomyopathy I25.5    Chronic systolic heart failure (HCC) I50.22    Carotid artery disease (HCC) I77.9    Hyperammonemia (HCC) E72.20    Dyslipidemia E78.5    Hyperuricemia E79.0    Euthyroid sick syndrome E07.81    Aftercare following surgery of the circulatory system Z48.812    Status post femoral-popliteal bypass surgery Z95.828    History of cardioversion Z98.890    Critical ischemia of lower extremity I99.8    Chronic ulcer of right foot ([facility]) L97.519    Vitamin D deficiency E55.9     Current Outpatient Prescriptions   Medication Sig Dispense Refill    aspirin 81 mg chewable tablet Take 1 Tab by mouth daily (with breakfast). 90 Tab 3    cholecalciferol (VITAMIN D3) 1,000 unit tablet Take 1 Tab by mouth daily. 90 Tab 3    losartan (COZAAR) 25 mg tablet Take 1 Tab by mouth daily. Indications: Chronic Heart Failure, hypertension 90 Tab 2    ascorbic acid, vitamin C, (VITAMIN C) 250 mg tablet Take 1 Tab by mouth daily (with breakfast). 90 Tab 2    DULoxetine (CYMBALTA) 60 mg capsule Take 1 Cap by mouth daily. 90 Cap 2    zinc sulfate (ZINCATE) 220 (50) mg capsule Take 1 Cap by mouth daily. 90 Cap 0    ferrous sulfate 325 mg (65 mg iron) tablet Take 1 Tab by mouth daily (with breakfast). 90 Tab 3    atorvastatin (LIPITOR) 40 mg tablet Take 1 Tab by mouth nightly. Indications: DYSLIPIDEMIA 90 Tab 3    amiodarone (CORDARONE) 200 mg tablet Take 1 Tab by mouth two (2) times a day. Start taking in the morning of 5/3/17  Indications: VENTRICULAR RATE CONTROL IN ATRIAL FIBRILLATION 60 Tab 0    clopidogrel (PLAVIX) 75 mg tab Take 1 Tab by mouth daily (with dinner). 30 Tab 0    oxyCODONE-acetaminophen (PERCOCET) 5-325 mg per tablet Take 1 Tab by mouth every six (6) hours as needed for Pain. Max Daily Amount: 4 Tabs. 40 Tab 0    metoprolol tartrate (LOPRESSOR) 25 mg tablet Take 0.5 Tabs by mouth every twelve (12) hours. Indications: hypertension, VENTRICULAR RATE CONTROL IN ATRIAL FIBRILLATION 30 Tab 6    dilTIAZem (CARDIZEM) 30 mg tablet Take 1 Tab by mouth Before breakfast, lunch, dinner and at bedtime. Indications: hypertension 120 Tab 6    warfarin (COUMADIN) 2.5 mg tablet 2.5 mg po daily except on Tuesday Take 3.75 mg and then as per [facility] (Patient taking differently: Take 2.5 mg by mouth five (5) days a week. 2.5 MG DAILY EXCEPT ON MON AND WED TAKE COUMADIN 3.75 . CHECK ON 6/12/17) 30 Tab 6    gabapentin (NEURONTIN) 100 mg capsule Take 1 Cap by mouth two (2) times a day. Indications: NEUROPATHIC PAIN 60 Cap 0    OTHER Check PT, INR on 5/1/17- results to Cardiologist immediately. Diagnosis- A Fib 1 Each 0    OTHER Check CBC, CMP, Mg on 5/1/17. Results to PCP immediately. Diagnosis- PAD 1 Each 0    cyanocobalamin (VITAMIN B-12) 1,000 mcg tablet Take 1 Tab by mouth daily. 90 Tab 3     No Known Allergies  Social History     Social History    Marital status: LEGALLY      Spouse name: N/A    Number of children: N/A    Years of education: N/A     Occupational History    Not on file.      Social History Main Topics    Smoking status: Former Smoker    Smokeless tobacco: Never Used      Comment: quit in 2011    Alcohol use 1.2 oz/week     2 Cans of beer per week      Comment: 10 cans of beer a month    Drug use: Yes     Special: Marijuana      Comment: occasionally    Sexual activity: Yes     Partners: Female     Other Topics Concern    Not on file     Social History Narrative      Family History   Problem Relation Age of Onset    Heart Disease Father        Physical Exam:    Visit Vitals    /70 (BP 1 Location: Left arm, BP Patient Position: Sitting)    Pulse 68    Resp 18    Ht 5' 10\" (1.778 m)    Wt 160 lb (72.6 kg)    BMI 22.96 kg/m2      General: Well-appearing male in no acute distress  HEENT: EOMI no scleral icterus is noted  Pulmonary: No increased work of breathing is noted  Extremities: Warm and perfused bilaterally he does have 1+ edema of the right lower extremity and trace on the left lower extremity on the right lower extremity the first great toe distal tip does show dry gangrene on the dorsal aspect. And the patient also has a very small eschar over his heel. No cellulitis is identified no drainage is identified all surgical wounds are well-healed  Neuro: Cranial nerves II through XII are grossly intact    Impression and Plan:  Nallely Payne is a 59 y.o. male with aortoiliac occlusive disease who had a right axillary to bifemoral bypass with subsequent right femoral to above-knee popliteal artery bypass. He ended up having complication of atrial fibrillation after the procedure and is on Coumadin for this. We will have to stop his Coumadin 3 days prior to the angiogram.  Based on the fact that I reviewed his ultrasound clinic today and he does have a moderate narrowing of the proximal anastomosis of the axillary artery. I do believe that performing an angiogram of this area and ballooning as necessary can be quite beneficial.  We would also check his bypasses in the procedure make sure everything is okay as long as everything is okay we would probably leave him as is especially since he is healing and moving forward with his lower extremities. But this will give us a general baseline idea as to where blockages are and if he does have any further claudication or rest pain where to move forward with future angiograms. He has been out of atrial fibrillation now for the past few weeks based off report from his cardiologist.  There is even some talk about him slowly coming off his amiodarone. Hopefully all of this can continue and he can continue to improve. We reviewed the plan with the patient and the patient understands.   We also gave the patient appropriate instructions on their disease process and when to call back. Follow-up Disposition: Not on [address] [facility]\"", MD    PLEASE NOTE:  This document has been produced using voice recognition software. Unrecognized errors in transcription may be present.

## 2017-06-07 NOTE — PROCEDURES
[facility]   *** FINAL REPORT ***    Name: Andres Krause  MRN: [id number]       Outpatient  : 22 Mar 1960  HIS Order #: [id number]  [address] [facility] Visit #: [id number]  Date: 2017    TYPE OF TEST: Bypass Graft Duplex    REASON FOR TEST  PVD, f/u Revasc    Graft:-  Summary:   Right common femoral - popliteal (above knee) bypass with  ptfe  Op. Date:  2017  Surgeon:   Bin Reeder    Results:-            Velocity  Ratio  Stenosis         Waveform            --------  -----  --------         ----------  Inflow:    197.0  Proximal:  266.0      1.4  Minimal          Monophasic  Upper:      32.0      0.1  Normal           Monophasic  Mid-graft:  33.0      1.0  Normal           Monophasic  Lower:      41.0      1.2  Normal           Monophasic  Distal:     64.0      1.6  Normal           Monophasic  Outflow:    34.0      0.5  Normal           Monophasic    MARINA:    INTERPRETATION/FINDINGS  Duplex images were obtained using 2-D gray scale, color flow and  spectral doppler analysis. Duplex exam of the bypass graft reveals :  1. Patent right common femoral to popliteal artery bypass graft with  monophasic inflow. 2. No significant stenosis within the graft. 3. Low velocities of 33c/s suggests a compromised graft. 4. The right MARINA is in the moderate range at 0.60 and the left MARINA is  in the severe range at 0.49.  5. No prior study for comparison. ADDITIONAL COMMENTS    I have personally reviewed the data relevant to the interpretation of  this  study. TECHNOLOGIST: Sterling Hampton RVT, RDMS  Signed: 2017 10:11 AM    PHYSICIAN: Waldemar Shaikh MD  Signed: 2017 02:19 PM

## 2017-06-07 NOTE — PROCEDURES
[facility]   *** FINAL REPORT ***    Name: Alberto Thompson  MRN: [id number]       Outpatient  : 22 Mar 1960  HIS Order #: [id number]  [address] [facility] Visit #: [id number]  Date: 2017    TYPE OF TEST: Bypass Graft Duplex    REASON FOR TEST  PVD, f/u Revasc    Graft:-  Summary:   Rt.common femoral to Lt. common femoral Extra-anatomic  Bypass with Graft  Op. Date:  2017  Surgeon:   Charlee Hernandez    Results:-            Velocity  Ratio  Stenosis         Waveform            --------  -----  --------         ----------  Inflow:    150.0  Proximal:  112.0      0.7  Normal           Monophasic  Upper:     131.0      1.2  Normal           Monophasic  Mid-graft:  69.0      0.5  Normal           Monophasic  Lower:      69.0      1.0  Normal           Monophasic  Distal:    112.0      1.6  Normal           Monophasic  Outflow:    87.0      0.8  Normal           Monophasic    MARINA:    INTERPRETATION/FINDINGS  Duplex images were obtained using 2-D gray scale, color flow and  spectral doppler analysis. Duplex exam of the bypass graft reveals :  1. Patent right common femoral to left common femoral artery bypass  graft with no evidence of significant stenosis. 2. Monophasic flow is identified in the inflow artery. 3. The right MARINA is 0.60 in the moderate range and the left MARINA is  0.49 in the severe range. 4. No prior study for comparison. ADDITIONAL COMMENTS    I have personally reviewed the data relevant to the interpretation of  this  study. TECHNOLOGIST: Martinez Hawkins RVT, RDMS  Signed: 2017 10:19 AM    PHYSICIAN: Douglas Sánchez.  Starla Preciado MD  Signed: 2017 02:19 PM

## 2017-06-09 ENCOUNTER — HOME CARE VISIT (OUTPATIENT)
Dept: SCHEDULING | Facility: HOME HEALTH | Age: 57
End: 2017-06-09
Payer: COMMERCIAL

## 2017-06-09 PROCEDURE — G0299 HHS/HOSPICE OF RN EA 15 MIN: HCPCS

## 2017-06-12 ENCOUNTER — HOME CARE VISIT (OUTPATIENT)
Dept: SCHEDULING | Facility: HOME HEALTH | Age: 57
End: 2017-06-12
Payer: COMMERCIAL

## 2017-06-12 VITALS
DIASTOLIC BLOOD PRESSURE: 67 MMHG | TEMPERATURE: 98.4 F | SYSTOLIC BLOOD PRESSURE: 123 MMHG | HEART RATE: 70 BPM | OXYGEN SATURATION: 98 % | RESPIRATION RATE: 20 BRPM

## 2017-06-12 DIAGNOSIS — I74.09 AORTOILIAC OCCLUSIVE DISEASE (HCC): Chronic | ICD-10-CM

## 2017-06-12 DIAGNOSIS — I70.213 ATHEROSCLEROSIS OF NATIVE ARTERY OF BOTH LOWER EXTREMITIES WITH INTERMITTENT CLAUDICATION (HCC): Chronic | ICD-10-CM

## 2017-06-12 DIAGNOSIS — I73.9 PERIPHERAL ARTERY DISEASE (HCC): Chronic | ICD-10-CM

## 2017-06-12 PROCEDURE — G0299 HHS/HOSPICE OF RN EA 15 MIN: HCPCS

## 2017-06-13 VITALS
HEART RATE: 68 BPM | DIASTOLIC BLOOD PRESSURE: 57 MMHG | RESPIRATION RATE: 20 BRPM | TEMPERATURE: 98.3 F | SYSTOLIC BLOOD PRESSURE: 108 MMHG | OXYGEN SATURATION: 96 %

## 2017-06-14 ENCOUNTER — HOME CARE VISIT (OUTPATIENT)
Dept: SCHEDULING | Facility: HOME HEALTH | Age: 57
End: 2017-06-14
Payer: COMMERCIAL

## 2017-06-14 PROCEDURE — G0299 HHS/HOSPICE OF RN EA 15 MIN: HCPCS

## 2017-06-15 ENCOUNTER — HOSPITAL ENCOUNTER (OUTPATIENT)
Dept: CARDIAC CATH/INVASIVE PROCEDURES | Age: 57
Discharge: HOME | End: 2017-06-15
Attending: SURGERY | Admitting: SURGERY
Payer: COMMERCIAL

## 2017-06-15 VITALS
SYSTOLIC BLOOD PRESSURE: 140 MMHG | HEIGHT: 70 IN | HEART RATE: 78 BPM | RESPIRATION RATE: 27 BRPM | WEIGHT: 160 LBS | OXYGEN SATURATION: 93 % | DIASTOLIC BLOOD PRESSURE: 64 MMHG | BODY MASS INDEX: 22.9 KG/M2

## 2017-06-15 VITALS
TEMPERATURE: 98.3 F | RESPIRATION RATE: 20 BRPM | OXYGEN SATURATION: 96 % | DIASTOLIC BLOOD PRESSURE: 74 MMHG | HEART RATE: 77 BPM | SYSTOLIC BLOOD PRESSURE: 129 MMHG

## 2017-06-15 LAB
ACT BLD: 213 SECS (ref 79–138)
ANION GAP BLD CALC-SCNC: 10 MMOL/L (ref 3–18)
BUN SERPL-MCNC: 10 MG/DL (ref 7–18)
BUN/CREAT SERPL: 13 (ref 12–20)
CALCIUM SERPL-MCNC: 8.9 MG/DL (ref 8.5–10.1)
CHLORIDE SERPL-SCNC: 94 MMOL/L (ref 100–108)
CO2 SERPL-SCNC: 25 MMOL/L (ref 21–32)
CREAT SERPL-MCNC: 0.77 MG/DL (ref 0.6–1.3)
ERYTHROCYTE [DISTWIDTH] IN BLOOD BY AUTOMATED COUNT: 15.2 % (ref 11.6–14.5)
GLUCOSE SERPL-MCNC: 103 MG/DL (ref 74–99)
HCT VFR BLD AUTO: 24 % (ref 36–48)
HGB BLD-MCNC: 8.4 G/DL (ref 13–16)
INR PPP: 1.3 (ref 0.8–1.2)
MCH RBC QN AUTO: 27.1 PG (ref 24–34)
MCHC RBC AUTO-ENTMCNC: 35 G/DL (ref 31–37)
MCV RBC AUTO: 77.4 FL (ref 74–97)
PLATELET # BLD AUTO: 409 K/UL (ref 135–420)
PMV BLD AUTO: 9.3 FL (ref 9.2–11.8)
POTASSIUM SERPL-SCNC: 3.4 MMOL/L (ref 3.5–5.5)
PROTHROMBIN TIME: 15.8 SEC (ref 11.5–15.2)
RBC # BLD AUTO: 3.1 M/UL (ref 4.7–5.5)
SODIUM SERPL-SCNC: 129 MMOL/L (ref 136–145)
WBC # BLD AUTO: 20.5 K/UL (ref 4.6–13.2)

## 2017-06-15 PROCEDURE — 74011000250 HC RX REV CODE- 250: Performed by: SURGERY

## 2017-06-15 PROCEDURE — 77030004530 HC CATH ANGI DX IMGR BSC -A

## 2017-06-15 PROCEDURE — C1894 INTRO/SHEATH, NON-LASER: HCPCS

## 2017-06-15 PROCEDURE — 75710 ARTERY X-RAYS ARM/LEG: CPT

## 2017-06-15 PROCEDURE — C1769 GUIDE WIRE: HCPCS

## 2017-06-15 PROCEDURE — 74011636320 HC RX REV CODE- 636/320: Performed by: SURGERY

## 2017-06-15 PROCEDURE — 99152 MOD SED SAME PHYS/QHP 5/>YRS: CPT

## 2017-06-15 PROCEDURE — C1713 ANCHOR/SCREW BN/BN,TIS/BN: HCPCS

## 2017-06-15 PROCEDURE — C1887 CATHETER, GUIDING: HCPCS

## 2017-06-15 PROCEDURE — C1724 CATH, TRANS ATHEREC,ROTATION: HCPCS

## 2017-06-15 PROCEDURE — C1725 CATH, TRANSLUMIN NON-LASER: HCPCS

## 2017-06-15 PROCEDURE — 75716 ARTERY X-RAYS ARMS/LEGS: CPT

## 2017-06-15 PROCEDURE — 77030013515 HC DEV INFL ANGI BSC -B

## 2017-06-15 PROCEDURE — 74011000250 HC RX REV CODE- 250

## 2017-06-15 PROCEDURE — 37225 HC ARTHERC FEMPOP UNI +/-PTA: CPT

## 2017-06-15 PROCEDURE — 99153 MOD SED SAME PHYS/QHP EA: CPT

## 2017-06-15 PROCEDURE — 85027 COMPLETE CBC AUTOMATED: CPT | Performed by: SURGERY

## 2017-06-15 PROCEDURE — 85347 COAGULATION TIME ACTIVATED: CPT

## 2017-06-15 PROCEDURE — 85610 PROTHROMBIN TIME: CPT | Performed by: SURGERY

## 2017-06-15 PROCEDURE — 76937 US GUIDE VASCULAR ACCESS: CPT

## 2017-06-15 PROCEDURE — 74011250636 HC RX REV CODE- 250/636: Performed by: SURGERY

## 2017-06-15 PROCEDURE — 80048 BASIC METABOLIC PNL TOTAL CA: CPT | Performed by: SURGERY

## 2017-06-15 PROCEDURE — C1760 CLOSURE DEV, VASC: HCPCS

## 2017-06-15 RX ORDER — LIDOCAINE HYDROCHLORIDE 10 MG/ML
1-30 INJECTION, SOLUTION EPIDURAL; INFILTRATION; INTRACAUDAL; PERINEURAL
Status: DISCONTINUED | OUTPATIENT
Start: 2017-06-15 | End: 2017-06-15 | Stop reason: HOSPADM

## 2017-06-15 RX ORDER — DIPHENHYDRAMINE HYDROCHLORIDE 50 MG/ML
12.5 INJECTION, SOLUTION INTRAMUSCULAR; INTRAVENOUS
Status: DISCONTINUED | OUTPATIENT
Start: 2017-06-15 | End: 2017-06-16 | Stop reason: HOSPADM

## 2017-06-15 RX ORDER — VERAPAMIL HYDROCHLORIDE 2.5 MG/ML
INJECTION, SOLUTION INTRAVENOUS
Status: COMPLETED
Start: 2017-06-15 | End: 2017-06-15

## 2017-06-15 RX ORDER — ONDANSETRON 2 MG/ML
4 INJECTION INTRAMUSCULAR; INTRAVENOUS
Status: DISCONTINUED | OUTPATIENT
Start: 2017-06-15 | End: 2017-06-16 | Stop reason: HOSPADM

## 2017-06-15 RX ORDER — SULFAMETHOXAZOLE AND TRIMETHOPRIM 400; 80 MG/1; MG/1
1 TABLET ORAL 2 TIMES DAILY
Qty: 20 TAB | Refills: 0 | Status: SHIPPED | OUTPATIENT
Start: 2017-06-15 | End: 2017-07-12

## 2017-06-15 RX ORDER — HEPARIN SODIUM 200 [USP'U]/100ML
500 INJECTION, SOLUTION INTRAVENOUS ONCE
Status: COMPLETED | OUTPATIENT
Start: 2017-06-15 | End: 2017-06-15

## 2017-06-15 RX ORDER — MORPHINE SULFATE 10 MG/ML
1 INJECTION, SOLUTION INTRAMUSCULAR; INTRAVENOUS
Status: DISCONTINUED | OUTPATIENT
Start: 2017-06-15 | End: 2017-06-16 | Stop reason: HOSPADM

## 2017-06-15 RX ORDER — FENTANYL CITRATE 50 UG/ML
12.5-1 INJECTION, SOLUTION INTRAMUSCULAR; INTRAVENOUS
Status: DISCONTINUED | OUTPATIENT
Start: 2017-06-15 | End: 2017-06-15 | Stop reason: HOSPADM

## 2017-06-15 RX ORDER — MIDAZOLAM HYDROCHLORIDE 1 MG/ML
.5-2 INJECTION, SOLUTION INTRAMUSCULAR; INTRAVENOUS
Status: DISCONTINUED | OUTPATIENT
Start: 2017-06-15 | End: 2017-06-15 | Stop reason: HOSPADM

## 2017-06-15 RX ORDER — VERAPAMIL HYDROCHLORIDE 2.5 MG/ML
10 INJECTION, SOLUTION INTRAVENOUS ONCE
Status: COMPLETED | OUTPATIENT
Start: 2017-06-15 | End: 2017-06-15

## 2017-06-15 RX ORDER — NITROGLYCERIN 40 MG/100ML
5-20 INJECTION INTRAVENOUS
Status: DISCONTINUED | OUTPATIENT
Start: 2017-06-15 | End: 2017-06-15 | Stop reason: HOSPADM

## 2017-06-15 RX ORDER — HEPARIN SODIUM 1000 [USP'U]/ML
1000-10000 INJECTION, SOLUTION INTRAVENOUS; SUBCUTANEOUS
Status: DISCONTINUED | OUTPATIENT
Start: 2017-06-15 | End: 2017-06-15 | Stop reason: HOSPADM

## 2017-06-15 RX ORDER — ACETAMINOPHEN 325 MG/1
650 TABLET ORAL
Status: DISCONTINUED | OUTPATIENT
Start: 2017-06-15 | End: 2017-06-16 | Stop reason: HOSPADM

## 2017-06-15 RX ORDER — SODIUM CHLORIDE 0.9 % (FLUSH) 0.9 %
5-10 SYRINGE (ML) INJECTION AS NEEDED
Status: DISCONTINUED | OUTPATIENT
Start: 2017-06-15 | End: 2017-06-16 | Stop reason: HOSPADM

## 2017-06-15 RX ORDER — SODIUM CHLORIDE 0.9 % (FLUSH) 0.9 %
5-10 SYRINGE (ML) INJECTION EVERY 8 HOURS
Status: DISCONTINUED | OUTPATIENT
Start: 2017-06-15 | End: 2017-06-16 | Stop reason: HOSPADM

## 2017-06-15 RX ORDER — OXYCODONE AND ACETAMINOPHEN 5; 325 MG/1; MG/1
1 TABLET ORAL
Status: DISCONTINUED | OUTPATIENT
Start: 2017-06-15 | End: 2017-06-16 | Stop reason: HOSPADM

## 2017-06-15 RX ADMIN — FENTANYL CITRATE 25 MCG: 50 INJECTION INTRAMUSCULAR; INTRAVENOUS at 14:15

## 2017-06-15 RX ADMIN — MORPHINE SULFATE 1 MG: 10 INJECTION INTRAMUSCULAR; INTRAVENOUS; SUBCUTANEOUS at 16:37

## 2017-06-15 RX ADMIN — FENTANYL CITRATE 25 MCG: 50 INJECTION INTRAMUSCULAR; INTRAVENOUS at 13:37

## 2017-06-15 RX ADMIN — FENTANYL CITRATE 25 MCG: 50 INJECTION INTRAMUSCULAR; INTRAVENOUS at 14:22

## 2017-06-15 RX ADMIN — VERAPAMIL HYDROCHLORIDE 10 MG: 2.5 INJECTION INTRAVENOUS at 14:13

## 2017-06-15 RX ADMIN — MIDAZOLAM HYDROCHLORIDE 1 MG: 1 INJECTION, SOLUTION INTRAMUSCULAR; INTRAVENOUS at 14:22

## 2017-06-15 RX ADMIN — NITROGLYCERIN 5 MCG/MIN: 40 INJECTION INTRAVENOUS at 14:13

## 2017-06-15 RX ADMIN — VERAPAMIL HYDROCHLORIDE 10 MG: 2.5 INJECTION, SOLUTION INTRAVENOUS at 14:13

## 2017-06-15 RX ADMIN — FENTANYL CITRATE 50 MCG: 50 INJECTION INTRAMUSCULAR; INTRAVENOUS at 14:19

## 2017-06-15 RX ADMIN — HEPARIN SODIUM 6000 UNITS: 1000 INJECTION, SOLUTION INTRAVENOUS; SUBCUTANEOUS at 14:14

## 2017-06-15 RX ADMIN — LIDOCAINE HYDROCHLORIDE 6 ML: 10 INJECTION, SOLUTION EPIDURAL; INFILTRATION; INTRACAUDAL; PERINEURAL at 14:00

## 2017-06-15 RX ADMIN — FENTANYL CITRATE 50 MCG: 50 INJECTION INTRAMUSCULAR; INTRAVENOUS at 14:17

## 2017-06-15 RX ADMIN — HEPARIN SODIUM 1000 UNITS: 200 INJECTION, SOLUTION INTRAVENOUS at 13:48

## 2017-06-15 RX ADMIN — FENTANYL CITRATE 50 MCG: 50 INJECTION INTRAMUSCULAR; INTRAVENOUS at 14:14

## 2017-06-15 RX ADMIN — IOPAMIDOL 16 ML: 612 INJECTION, SOLUTION INTRAVENOUS at 14:37

## 2017-06-15 RX ADMIN — MIDAZOLAM HYDROCHLORIDE 1 MG: 1 INJECTION, SOLUTION INTRAMUSCULAR; INTRAVENOUS at 13:37

## 2017-06-15 RX ADMIN — MIDAZOLAM HYDROCHLORIDE 1 MG: 1 INJECTION, SOLUTION INTRAMUSCULAR; INTRAVENOUS at 14:14

## 2017-06-15 NOTE — ROUTINE PROCESS
TRANSFER - OUT REPORT:    Verbal report given to Jaime Lennon RN(name) on Mili West  being transferred to [facility]) for routine progression of care       Report consisted of patients Situation, Background, Assessment and   Recommendations(SBAR). Information from the following report(s) SBAR, Procedure Summary, Intake/Output and MAR was reviewed with the receiving nurse. Lines:       Opportunity for questions and clarification was provided.       Patient transported with:   [place]

## 2017-06-15 NOTE — ROUTINE PROCESS
1435: Pt received from Cath lab, drowsy, denies pain. 4Fr sheath intact right femoral. . Will recheck lab value in appx 30 minutes. Pt verbalizes understanding of keeping right leg straight and to remain supine to prevent bleeding precautions. Call bell within reach. 1551: , sheath removed via sterile technique. Pressure dressing applied; c/d/i.  1615: Groin site c/d/i. Daughter at bedside. 1800: Pt discharge instructions reviewed with patient and daughter, verbalize understanding. Groin site c/d/i, no oozing/hematoma formation. PIV removed, dsd applied. Escorted to daughter's car via wheelchair. Discharged home.

## 2017-06-15 NOTE — IP AVS SNAPSHOT
Golda Hodgkin 
 
 
 [address] Patient: Severiano Olea MRN: [id number] You are allergic to the following No active allergies Recent Documentation Height Weight BMI Smoking Status 1.778 m 72.6 kg 22.96 kg/m2 Former Smoker Emergency Contacts Name Discharge Info Relation Home Work Mobile GordoLynnette DISCHARGE CAREGIVER [3] Daughter [21] [phone] About your hospitalization You were admitted on:  Priya 15, 2017 You last received care in the:  [facility] You were discharged on:  Priya 15, 2017 Unit phone number:  [phone] Why you were hospitalized Your primary diagnosis was:  Not on File Providers Seen During Your Hospitalizations Provider Role Specialty Primary office phone Primitivo Sandoval MD Attending Provider Vascular Surgery [phone] Your Primary Care Physician (PCP) Primary Care Physician Office Phone Office Fax Rogelio Burgos [phone] Follow-up Information Follow up With Details Comments Contact Info Stef Umanzor, [address] [facility] [address] 200 [facility] 
[phone] Primitivo Sandoval MD Schedule an appointment as soon as possible for a visit in 5 days  [address] [place] 
[facility]  [facility] 
[phone] Your Appointments Friday June 16, 2017 To Be Determined ROUTINE with BEAU Joshi [facility] HOME CARE SCHEDULING/INTAKE ([facility] SCHEDULING/INTAKE ([facility]) Monday June 19, 2017 To Be Determined ROUTINE with BEAU Joshi [facility] HOME CARE SCHEDULING/INTAKE ([facility]) [address] CARE SCHEDULING/INTAKE ([facility]) Wednesday June 21, 2017 To Be Determined ROUTINE with BEAU Hoover [facility]ULING[facility]TAKE [facility] [address] CARE SCHEDULING/INTAKE ([facility] Friday June 23, 2017 To Be Determined ROUTINE with BEAU Hoover [facility]TA[facility] [address] CARE SCHEDULING/INTAKE ([facility] Monday June 26, 2017 To Be Determined ROUTINE with BEAU Hoover [facility] SCHEDULING[facility]TAKE [facility] [address] CARE SCHEDULING/INTAKE ([facility] Wednesday June 28, 2017 To Be Determined RECERTIFICATION with BEAU Hoover [address] CARE SCHEDULING/INTAKE ([facility]) [address] CARE SCHEDULING/INTAKE ([facility]) Wednesday June 28, 2017  9:45 AM EDT HOSPITAL DISCHARGE with MD Kailyn Cheema [facility]) [address] [place] [address] 643 200 [facility]  
[phone] Friday June 30, 2017 10:20 AM EDT Follow Up with Janeen Mendoza MD  
[facility] ([facility]) [facility] [address] [address] [address] [phone] Friday July 14, 2017  9:45 AM EDT ROUTINE CARE with Cecelia Mcdonald MD  
[facility] ([facility]) [address] [place] [address] Street [address] 200 [facility]  
[phone] Current Discharge Medication List  
  
START taking these medications Dose & Instructions Dispensing Information Comments Morning Noon Evening Bedtime  
 trimethoprim-sulfamethoxazole  mg per tablet Commonly known as:  Malena Castro Your last dose was: Your next dose is:    
   
   
 Dose:  1 Tab Take 1 Tab by mouth two (2) times a day. Quantity:  20 Tab Refills:  0 CONTINUE these medications which have CHANGED Dose & Instructions Dispensing Information Comments Morning Noon Evening Bedtime  
 warfarin 2.5 mg tablet Commonly known as:  COUMADIN What changed:   
- how much to take 
- how to take this - when to take this 
- additional instructions Your last dose was: Your next dose is:    
   
   
 2.5 mg po daily except on Tuesday Take 3.75 mg and then as per [facility] Quantity:  30 Tab Refills:  6 CONTINUE these medications which have NOT CHANGED Dose & Instructions Dispensing Information Comments Morning Noon Evening Bedtime  
 amiodarone 200 mg tablet Commonly known as:  CORDARONE Your last dose was: Your next dose is:    
   
   
 Dose:  200 mg Take 1 Tab by mouth two (2) times a day. 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  
     
   
   
   
  
 ascorbic acid (vitamin C) 250 mg tablet Commonly known as:  VITAMIN C Your last dose was: Your next dose is:    
   
   
 Dose:  250 mg Take 1 Tab by mouth daily (with breakfast). Quantity:  90 Tab Refills:  2  
     
   
   
   
  
 aspirin 81 mg chewable tablet Your last dose was: Your next dose is:    
   
   
 Dose:  81 mg Take 1 Tab by mouth daily (with breakfast). Quantity:  90 Tab Refills:  3  
     
   
   
   
  
 atorvastatin 40 mg tablet Commonly known as:  LIPITOR Your last dose was:     
   
Your next dose is:    
   
   
 Dose:  40 mg  
 Take 1 Tab by mouth nightly. Indications: DYSLIPIDEMIA Quantity:  90 Tab Refills:  3 cholecalciferol 1,000 unit tablet Commonly known as:  VITAMIN D3 Your last dose was: Your next dose is:    
   
   
 Dose:  1000 Units Take 1 Tab by mouth daily. Quantity:  90 Tab Refills:  3  
     
   
   
   
  
 clopidogrel 75 mg Tab Commonly known as:  PLAVIX Your last dose was: Your next dose is:    
   
   
 Dose:  75 mg Take 1 Tab by mouth daily (with dinner). Quantity:  30 Tab Refills:  0  
     
   
   
   
  
 cyanocobalamin 1,000 mcg tablet Commonly known as:  VITAMIN B-12 Your last dose was: Your next dose is:    
   
   
 Dose:  1000 mcg Take 1 Tab by mouth daily. Quantity:  90 Tab Refills:  3  
     
   
   
   
  
 dilTIAZem 30 mg tablet Commonly known as:  CARDIZEM Your last dose was: Your next dose is:    
   
   
 Dose:  30 mg Take 1 Tab by mouth Before breakfast, lunch, dinner and at bedtime. Indications: hypertension Quantity:  120 Tab Refills:  6 DULoxetine 60 mg capsule Commonly known as:  CYMBALTA Your last dose was: Your next dose is:    
   
   
 Dose:  60 mg Take 1 Cap by mouth daily. Quantity:  90 Cap Refills:  2  
     
   
   
   
  
 ferrous sulfate 325 mg (65 mg iron) tablet Your last dose was: Your next dose is:    
   
   
 Dose:  325 mg Take 1 Tab by mouth daily (with breakfast). Quantity:  90 Tab Refills:  3  
     
   
   
   
  
 gabapentin 100 mg capsule Commonly known as:  NEURONTIN Your last dose was: Your next dose is:    
   
   
 Dose:  100 mg Take 1 Cap by mouth two (2) times a day. Indications: NEUROPATHIC PAIN Quantity:  60 Cap Refills:  0  
     
   
   
   
  
 losartan 25 mg tablet Commonly known as:  COZAAR Your last dose was: Your next dose is: Dose:  25 mg Take 1 Tab by mouth daily. Indications: Chronic Heart Failure, hypertension Quantity:  90 Tab Refills:  2  
     
   
   
   
  
 metoprolol tartrate 25 mg tablet Commonly known as:  LOPRESSOR Your last dose was: Your next dose is:    
   
   
 Dose:  12.5 mg Take 0.5 Tabs by mouth every twelve (12) hours. Indications: hypertension, VENTRICULAR RATE CONTROL IN ATRIAL FIBRILLATION Quantity:  30 Tab Refills:  6  
     
   
   
   
  
 * OTHER Your last dose was: Your next dose is:    
   
   
 Check PT, INR on 5/1/17- results to Cardiologist immediately. Diagnosis- A Fib Quantity:  1 Each Refills:  0  
     
   
   
   
  
 * OTHER Your last dose was: Your next dose is:    
   
   
 Check CBC, CMP, Mg on 5/1/17. Results to PCP immediately. Diagnosis- PAD Quantity:  1 Each Refills:  0  
     
   
   
   
  
 oxyCODONE-acetaminophen 5-325 mg per tablet Commonly known as:  PERCOCET Your last dose was: Your next dose is:    
   
   
 Dose:  1 Tab Take 1 Tab by mouth every six (6) hours as needed for Pain. Max Daily Amount: 4 Tabs. Quantity:  40 Tab Refills:  0  
     
   
   
   
  
 zinc sulfate 220 (50) mg capsule Commonly known as:  ZINCATE Your last dose was: Your next dose is:    
   
   
 Dose:  220 mg Take 1 Cap by mouth daily. Quantity:  90 Cap Refills:  0  
     
   
   
   
  
 * Notice: This list has 2 medication(s) that are the same as other medications prescribed for you. Read the directions carefully, and ask your doctor or other care provider to review them with you. Where to Get Your Medications These medications were sent to [address] [facility], [address] [facility]  [address], [address] [place] [address] Hours:  24-hours Phone:  [phone]  
  trimethoprim-sulfamethoxazole  mg per tablet Discharge Instructions Angiogram: What to Expect at [facility] Your Recovery An angiogram is an X-ray test that uses dye and a camera to take pictures of the blood flow in an artery or a vein. The doctor inserted a thin, flexible tube (catheter) into a blood vessel in your groin. In some cases, the catheter is placed in a blood vessel in the arm. An angiogram is done for many reasons. For example, you may have an angiogram to find the source of bleeding, such as an ulcer. Or it may be done to look for blocked blood vessels in your lungs. After an angiogram, your groin or arm may have a bruise and feel sore for a day or two. You can do light activities around the house but nothing strenuous for several days. Your doctor may give you specific instructions on when you can do your normal activities again, such as driving and going back to work. This care sheet gives you a general idea about how long it will take for you to recover. But each person recovers at a different pace. Follow the steps below to feel better as quickly as possible. How can you care for yourself at home? Activity · Do not do strenuous exercise and do not lift, pull, or push anything heavy until your doctor says it is okay. This may be for a day or two. You can walk around the house and do light activity, such as cooking. · You may shower 24 to 48 hours after the procedure, if your doctor okays it. Pat the incision dry. Do not take a bath for 1 week, or until your doctor tells you it is okay. · If the catheter was placed in your groin, try not to walk up stairs for the first couple of days. · If the catheter was placed in your arm near your wrist, do not bend your wrist deeply for the first couple of days. Be careful using your hand to get into and out of a chair or bed. · If your doctor recommends it, get more exercise. Walking is a good choice. Bit by bit, increase the amount you walk every day. Try for at least 30 minutes on most days of the week. Diet · Drink plenty of fluids to help your body flush out the dye. If you have kidney, heart, or liver disease and have to limit fluids, talk with your doctor before you increase the amount of fluids you drink. · You can eat your normal diet. If your stomach is upset, try bland, low-fat foods like plain rice, broiled chicken, toast, and yogurt. Medicines · Be safe with medicines. Read and follow all instructions on the label. ¨ If the doctor gave you a prescription medicine for pain, take it as prescribed. ¨ If you are not taking a prescription pain medicine, ask your doctor if you can take an over-the-counter medicine. · If you take blood thinners, such as warfarin (Coumadin), clopidogrel (Plavix), or aspirin, be sure to talk to your doctor. He or she will tell you if and when to start taking those medicines again. Make sure that you understand exactly what your doctor wants you to do. · Your doctor will tell you if and when you can restart your medicines. He or she will also give you instructions about taking any new medicines. Care of the catheter site · You will have a dressing over the cut (incision). A dressing helps the incision heal and protects it. Your doctor will tell you how to take care of this. · Put ice or a cold pack on the area for 10 to 20 minutes at a time to help with soreness or swelling. Put a thin cloth between the ice and your skin. Follow-up care is a key part of your treatment and safety. Be sure to make and go to all appointments, and call your doctor if you are having problems. It's also a good idea to know your test results and keep a list of the medicines you take. When should you call for help? Call 911 anytime you think you may need emergency care. For example, call if: 
· You passed out (lost consciousness). · You have severe trouble breathing. · You have sudden chest pain and shortness of breath, or you cough up blood. Call your doctor now or seek immediate medical care if: 
· You are bleeding from the area where the catheter was put in your artery. · You have a fast-growing, painful lump at the catheter site. · You have signs of infection, such as: 
¨ Increased pain, swelling, warmth, or redness. ¨ Red streaks leading from the incision. ¨ Pus draining from the incision. ¨ A fever. Watch closely for any changes in your health, and be sure to contact your doctor if: 
· You don't get better as expected. Where can you learn more? Go to http://yuniel-lyndsay.info/. Enter K119 in the search box to learn more about \"Angiogram: What to Expect at Home. \" Current as of: October 14, 2016 Content Version: 11.2 © 3696-9090 [employer]. Care instructions adapted under license by [employer] (which disclaims liability or warranty for this information). If you have questions about a medical condition or this instruction, always ask your healthcare professional. [address] any warranty or liability for your use of this information. Discharge Orders None General Information Please provide this summary of care documentation to your next provider. Patient Signature:  ____________________________________________________________ Date:  ____________________________________________________________  
  
Ximena Ramachandran Provider Signature:  ____________________________________________________________ Date:  ____________________________________________________________

## 2017-06-15 NOTE — DISCHARGE INSTRUCTIONS
Angiogram: What to Expect at Home  Your Recovery  An angiogram is an X-ray test that uses dye and a camera to take pictures of the blood flow in an artery or a vein. The doctor inserted a thin, flexible tube (catheter) into a blood vessel in your groin. In some cases, the catheter is placed in a blood vessel in the arm. An angiogram is done for many reasons. For example, you may have an angiogram to find the source of bleeding, such as an ulcer. Or it may be done to look for blocked blood vessels in your lungs. After an angiogram, your groin or arm may have a bruise and feel sore for a day or two. You can do light activities around the house but nothing strenuous for several days. Your doctor may give you specific instructions on when you can do your normal activities again, such as driving and going back to work. This care sheet gives you a general idea about how long it will take for you to recover. But each person recovers at a different pace. Follow the steps below to feel better as quickly as possible. How can you care for yourself at home? Activity  · Do not do strenuous exercise and do not lift, pull, or push anything heavy until your doctor says it is okay. This may be for a day or two. You can walk around the house and do light activity, such as cooking. · You may shower 24 to 48 hours after the procedure, if your doctor okays it. Pat the incision dry. Do not take a bath for 1 week, or until your doctor tells you it is okay. · If the catheter was placed in your groin, try not to walk up stairs for the first couple of days. · If the catheter was placed in your arm near your wrist, do not bend your wrist deeply for the first couple of days. Be careful using your hand to get into and out of a chair or bed. · If your doctor recommends it, get more exercise. Walking is a good choice. Bit by bit, increase the amount you walk every day.  Try for at least 30 minutes on most days of the week.  Diet  · Drink plenty of fluids to help your body flush out the dye. If you have kidney, heart, or liver disease and have to limit fluids, talk with your doctor before you increase the amount of fluids you drink. · You can eat your normal diet. If your stomach is upset, try bland, low-fat foods like plain rice, broiled chicken, toast, and yogurt. Medicines  · Be safe with medicines. Read and follow all instructions on the label. ¨ If the doctor gave you a prescription medicine for pain, take it as prescribed. ¨ If you are not taking a prescription pain medicine, ask your doctor if you can take an over-the-counter medicine. · If you take blood thinners, such as warfarin (Coumadin), clopidogrel (Plavix), or aspirin, be sure to talk to your doctor. He or she will tell you if and when to start taking those medicines again. Make sure that you understand exactly what your doctor wants you to do. · Your doctor will tell you if and when you can restart your medicines. He or she will also give you instructions about taking any new medicines. Care of the catheter site  · You will have a dressing over the cut (incision). A dressing helps the incision heal and protects it. Your doctor will tell you how to take care of this. · Put ice or a cold pack on the area for 10 to 20 minutes at a time to help with soreness or swelling. Put a thin cloth between the ice and your skin. Follow-up care is a key part of your treatment and safety. Be sure to make and go to all appointments, and call your doctor if you are having problems. It's also a good idea to know your test results and keep a list of the medicines you take. When should you call for help? Call 911 anytime you think you may need emergency care. For example, call if:  · You passed out (lost consciousness). · You have severe trouble breathing. · You have sudden chest pain and shortness of breath, or you cough up blood.   Call your doctor now or seek immediate medical care if:  · You are bleeding from the area where the catheter was put in your artery. · You have a fast-growing, painful lump at the catheter site. · You have signs of infection, such as:  ¨ Increased pain, swelling, warmth, or redness. ¨ Red streaks leading from the incision. ¨ Pus draining from the incision. ¨ A fever. Watch closely for any changes in your health, and be sure to contact your doctor if:  · You don't get better as expected. Where can you learn more? Go to http://yuniel-lyndsay.info/. Enter C994 in the search box to learn more about \"Angiogram: What to Expect at Home. \"  Current as of: October 14, 2016  Content Version: 11.2  © 7362-4193 [employer]. Care instructions adapted under license by [employer] (which disclaims liability or warranty for this information). If you have questions about a medical condition or this instruction, always ask your healthcare professional. [place] [address] any warranty or liability for your use of this information.

## 2017-06-15 NOTE — H&P (VIEW-ONLY)
Katharina Gilbert    Chief Complaint   Patient presents with    Leg Pain       History and Physical    Katharina Gilbert is a 59 y.o. male who is about 2 months status post right axillary to bifemoral bypass with right common femoral to above-knee popliteal artery bypass. Overall the patient states that his legs feel a lot better than before the procedure. He is no longer in any rest pain. 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 This is being followed by a podiatrist.  These are continuing to heal.  He does continue to have some numbness and tingling of bilateral lower extremities and some swelling worse on the right than the left. But overall is in good spirits and seems to be feeling a lot better. Past Medical History:   Diagnosis Date    Acute blood loss as cause of postoperative anemia 4/4/2017    Anticoagulated on Coumadin     Aortoiliac occlusive disease (HCC) 1/25/2017    Atherosclerosis of native artery of both lower extremities with intermittent claudication ([address] 1/25/2017    Benign hypertensive heart disease with systolic congestive heart failure, NYHA class 2 ([address] 1/26/2015    Carotid artery disease (HCC)     Chronic atrial fibrillation (HCC)     Chronic systolic heart failure (HCC)     Chronic ulcer of right foot ([address] 4/4/2017    Coronary artery disease involving native coronary artery of native heart 3/15/2017    Successful stenting of Cx (Xience LESLEY) and RCA (Xience LESLEY) to 0% by Dr. Trent Ibrahim on 3/15/17.     DDD (degenerative disc disease), lumbar 1/26/2015    Dyslipidemia     Erectile dysfunction 7/5/2016    Euthyroid sick syndrome 4/6/2017    Hereditary peripheral neuropathy 11/15/2016    History of cardioversion 4/18/2017    S/P Synchronized external cardioversion (4/18/2017 - Dr. Victorina Moss)    Hyperuricemia     Ischemic cardiomyopathy     Peripheral artery disease ([address] 1/25/2017    Spinal stenosis of lumbar region with radiculopathy 5/4/2015    Dr. Haleigh Yoon Vitamin D deficiency 4/22/2017    Vitamin D 25-Hydroxy (4/22/2017) = 12.0     Past Surgical History:   Procedure Laterality Date    CARDIAC CATHETERIZATION  3/8/2017         CARDIAC CATHETERIZATION  3/15/2017         CORONARY STENT SINGLE W/PTCA  3/15/2017         HX COLONOSCOPY  07/23/2015    polyps repeat 2020 5 years Jian Henderson    HX FEMORAL BYPASS Right 04/04/2017    S/P Right axillary to bifemoral artery bypass using an 8 mm Propaten graft from the right axilla to the right common femoral artery with a jump femoral-femoral bypass with another 8 mm Propaten external ring bypass; Right common femoral artery, and profunda femoris artery and superficial femoral artery endarterectomy causing an extensive surgery on the right side (4/4/2017 - Dr. Yuri Ascencio)   [address] Right 04/07/2017    S/P Cutdown of femoral-femoral bypass, more on the left groin side; Right lower extremity angiography with first-order catheterization (4/7/2017 - Dr. Fina Beltre)    HX FEMORAL BYPASS Right 04/10/2017    S/P Right femoral to above-knee popliteal bypass with an 8 mm PTFE graft (4/10/2017 - Dr. Cleo Leiva)     Patient Active Problem List   Diagnosis Code    Benign hypertensive heart disease with systolic congestive heart failure, NYHA class 2 ([facility]) I11.0, I50.20    Vitamin B12 deficiency E53.8    DDD (degenerative disc disease), lumbar M51.36    Erectile dysfunction N52.9    Spinal stenosis of lumbar region with radiculopathy M48.06, M54.16    Hereditary peripheral neuropathy G60.9    Aortoiliac occlusive disease ([address] I74.09    Atherosclerosis of native artery of both lower extremities with intermittent claudication ([address] I70.213    Peripheral artery disease ([address] I73.9    Coronary artery disease involving native coronary artery of native heart I25.10    Chronic atrial fibrillation (HCC) I48.2    Transient alteration of awareness R40.4    Acute blood loss as cause of postoperative anemia D62    Impaired mobility and ADLs Z74.09    Anticoagulated on Coumadin Z51.81, Z79.01    Ischemic cardiomyopathy I25.5    Chronic systolic heart failure (HCC) I50.22    Carotid artery disease (HCC) I77.9    Hyperammonemia (HCC) E72.20    Dyslipidemia E78.5    Hyperuricemia E79.0    Euthyroid sick syndrome E07.81    Aftercare following surgery of the circulatory system Z48.812    Status post femoral-popliteal bypass surgery Z95.828    History of cardioversion Z98.890    Critical ischemia of lower extremity I99.8    Chronic ulcer of right foot ([facility]) L97.519    Vitamin D deficiency E55.9     Current Outpatient Prescriptions   Medication Sig Dispense Refill    aspirin 81 mg chewable tablet Take 1 Tab by mouth daily (with breakfast). 90 Tab 3    cholecalciferol (VITAMIN D3) 1,000 unit tablet Take 1 Tab by mouth daily. 90 Tab 3    losartan (COZAAR) 25 mg tablet Take 1 Tab by mouth daily. Indications: Chronic Heart Failure, hypertension 90 Tab 2    ascorbic acid, vitamin C, (VITAMIN C) 250 mg tablet Take 1 Tab by mouth daily (with breakfast). 90 Tab 2    DULoxetine (CYMBALTA) 60 mg capsule Take 1 Cap by mouth daily. 90 Cap 2    zinc sulfate (ZINCATE) 220 (50) mg capsule Take 1 Cap by mouth daily. 90 Cap 0    ferrous sulfate 325 mg (65 mg iron) tablet Take 1 Tab by mouth daily (with breakfast). 90 Tab 3    atorvastatin (LIPITOR) 40 mg tablet Take 1 Tab by mouth nightly. Indications: DYSLIPIDEMIA 90 Tab 3    amiodarone (CORDARONE) 200 mg tablet Take 1 Tab by mouth two (2) times a day. Start taking in the morning of 5/3/17  Indications: VENTRICULAR RATE CONTROL IN ATRIAL FIBRILLATION 60 Tab 0    clopidogrel (PLAVIX) 75 mg tab Take 1 Tab by mouth daily (with dinner). 30 Tab 0    oxyCODONE-acetaminophen (PERCOCET) 5-325 mg per tablet Take 1 Tab by mouth every six (6) hours as needed for Pain. Max Daily Amount: 4 Tabs. 40 Tab 0    metoprolol tartrate (LOPRESSOR) 25 mg tablet Take 0.5 Tabs by mouth every twelve (12) hours. Indications: hypertension, VENTRICULAR RATE CONTROL IN ATRIAL FIBRILLATION 30 Tab 6    dilTIAZem (CARDIZEM) 30 mg tablet Take 1 Tab by mouth Before breakfast, lunch, dinner and at bedtime. Indications: hypertension 120 Tab 6    warfarin (COUMADIN) 2.5 mg tablet 2.5 mg po daily except on Tuesday Take 3.75 mg and then as per [facility] (Patient taking differently: Take 2.5 mg by mouth five (5) days a week. 2.5 MG DAILY EXCEPT ON MON AND WED TAKE COUMADIN 3.75 . CHECK ON 6/12/17) 30 Tab 6    gabapentin (NEURONTIN) 100 mg capsule Take 1 Cap by mouth two (2) times a day. Indications: NEUROPATHIC PAIN 60 Cap 0    OTHER Check PT, INR on 5/1/17- results to Cardiologist immediately. Diagnosis- A Fib 1 Each 0    OTHER Check CBC, CMP, Mg on 5/1/17. Results to PCP immediately. Diagnosis- PAD 1 Each 0    cyanocobalamin (VITAMIN B-12) 1,000 mcg tablet Take 1 Tab by mouth daily. 90 Tab 3     No Known Allergies  Social History     Social History    Marital status: LEGALLY      Spouse name: N/A    Number of children: N/A    Years of education: N/A     Occupational History    Not on file.      Social History Main Topics    Smoking status: Former Smoker    Smokeless tobacco: Never Used      Comment: quit in 2011    Alcohol use 1.2 oz/week     2 Cans of beer per week      Comment: 10 cans of beer a month    Drug use: Yes     Special: Marijuana      Comment: occasionally    Sexual activity: Yes     Partners: Female     Other Topics Concern    Not on file     Social History Narrative      Family History   Problem Relation Age of Onset    Heart Disease Father        Physical Exam:    Visit Vitals    /70 (BP 1 Location: Left arm, BP Patient Position: Sitting)    Pulse 68    Resp 18    Ht 5' 10\" (1.778 m)    Wt 160 lb (72.6 kg)    BMI 22.96 kg/m2      General: Well-appearing male in no acute distress  HEENT: EOMI no scleral icterus is noted  Pulmonary: No increased work of breathing is noted  Extremities: Warm and perfused bilaterally he does have 1+ edema of the right lower extremity and trace on the left lower extremity on the right lower extremity the first great toe distal tip does show dry gangrene on the dorsal aspect. And the patient also has a very small eschar over his heel. No cellulitis is identified no drainage is identified all surgical wounds are well-healed  Neuro: Cranial nerves II through XII are grossly intact    Impression and Plan:  Deena Enamorado is a 59 y.o. male with aortoiliac occlusive disease who had a right axillary to bifemoral bypass with subsequent right femoral to above-knee popliteal artery bypass. He ended up having complication of atrial fibrillation after the procedure and is on Coumadin for this. We will have to stop his Coumadin 3 days prior to the angiogram.  Based on the fact that I reviewed his ultrasound clinic today and he does have a moderate narrowing of the proximal anastomosis of the axillary artery. I do believe that performing an angiogram of this area and ballooning as necessary can be quite beneficial.  We would also check his bypasses in the procedure make sure everything is okay as long as everything is okay we would probably leave him as is especially since he is healing and moving forward with his lower extremities. But this will give us a general baseline idea as to where blockages are and if he does have any further claudication or rest pain where to move forward with future angiograms. He has been out of atrial fibrillation now for the past few weeks based off report from his cardiologist.  There is even some talk about him slowly coming off his amiodarone. Hopefully all of this can continue and he can continue to improve. We reviewed the plan with the patient and the patient understands.   We also gave the patient appropriate instructions on their disease process and when to call back. Follow-up Disposition: Not on [facility] [place], [place]    PLEASE NOTE:  This document has been produced using voice recognition software. Unrecognized errors in transcription may be present.

## 2017-06-15 NOTE — ROUTINE PROCESS
TRANSFER - IN REPORT:    Verbal report received from [place], RN(name) on Joana Adams  being received from [facility](unit) for routine progression of care      Report consisted of patients Situation, Background, Assessment and   Recommendations(SBAR). Information from the following report(s) SBAR and Procedure Summary was reviewed with the receiving nurse. Opportunity for questions and clarification was provided. Assessment completed upon patients arrival to unit and care assumed.

## 2017-06-15 NOTE — INTERVAL H&P NOTE
H&P Update:  Deena Enamorado was seen and examined. History and physical has been reviewed. The patient has been examined.  There have been no significant clinical changes since the completion of the originally dated History and Physical.    Signed By: Autumn Ballard MD     Priya 15, 2017 2:31 PM

## 2017-06-15 NOTE — IP AVS SNAPSHOT
Current Discharge Medication List  
  
START taking these medications Dose & Instructions Dispensing Information Comments Morning Noon Evening Bedtime  
 trimethoprim-sulfamethoxazole  mg per tablet Commonly known as:  Lae Trenton Your last dose was: Your next dose is:    
   
   
 Dose:  1 Tab Take 1 Tab by mouth two (2) times a day. Quantity:  20 Tab Refills:  0 CONTINUE these medications which have CHANGED Dose & Instructions Dispensing Information Comments Morning Noon Evening Bedtime  
 warfarin 2.5 mg tablet Commonly known as:  COUMADIN What changed:   
- how much to take 
- how to take this - when to take this 
- additional instructions Your last dose was: Your next dose is:    
   
   
 2.5 mg po daily except on Tuesday Take 3.75 mg and then as per [facility] Quantity:  30 Tab Refills:  6 CONTINUE these medications which have NOT CHANGED Dose & Instructions Dispensing Information Comments Morning Noon Evening Bedtime  
 amiodarone 200 mg tablet Commonly known as:  CORDARONE Your last dose was: Your next dose is:    
   
   
 Dose:  200 mg Take 1 Tab by mouth two (2) times a day. 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  
     
   
   
   
  
 ascorbic acid (vitamin C) 250 mg tablet Commonly known as:  VITAMIN C Your last dose was: Your next dose is:    
   
   
 Dose:  250 mg Take 1 Tab by mouth daily (with breakfast). Quantity:  90 Tab Refills:  2  
     
   
   
   
  
 aspirin 81 mg chewable tablet Your last dose was: Your next dose is:    
   
   
 Dose:  81 mg Take 1 Tab by mouth daily (with breakfast). Quantity:  90 Tab Refills:  3  
     
   
   
   
  
 atorvastatin 40 mg tablet Commonly known as:  LIPITOR Your last dose was: Your next dose is:    
   
   
 Dose:  40 mg Take 1 Tab by mouth nightly. Indications: DYSLIPIDEMIA Quantity:  90 Tab Refills:  3 cholecalciferol 1,000 unit tablet Commonly known as:  VITAMIN D3 Your last dose was: Your next dose is:    
   
   
 Dose:  1000 Units Take 1 Tab by mouth daily. Quantity:  90 Tab Refills:  3  
     
   
   
   
  
 clopidogrel 75 mg Tab Commonly known as:  PLAVIX Your last dose was: Your next dose is:    
   
   
 Dose:  75 mg Take 1 Tab by mouth daily (with dinner). Quantity:  30 Tab Refills:  0  
     
   
   
   
  
 cyanocobalamin 1,000 mcg tablet Commonly known as:  VITAMIN B-12 Your last dose was: Your next dose is:    
   
   
 Dose:  1000 mcg Take 1 Tab by mouth daily. Quantity:  90 Tab Refills:  3  
     
   
   
   
  
 dilTIAZem 30 mg tablet Commonly known as:  CARDIZEM Your last dose was: Your next dose is:    
   
   
 Dose:  30 mg Take 1 Tab by mouth Before breakfast, lunch, dinner and at bedtime. Indications: hypertension Quantity:  120 Tab Refills:  6 DULoxetine 60 mg capsule Commonly known as:  CYMBALTA Your last dose was: Your next dose is:    
   
   
 Dose:  60 mg Take 1 Cap by mouth daily. Quantity:  90 Cap Refills:  2  
     
   
   
   
  
 ferrous sulfate 325 mg (65 mg iron) tablet Your last dose was: Your next dose is:    
   
   
 Dose:  325 mg Take 1 Tab by mouth daily (with breakfast). Quantity:  90 Tab Refills:  3  
     
   
   
   
  
 gabapentin 100 mg capsule Commonly known as:  NEURONTIN Your last dose was: Your next dose is:    
   
   
 Dose:  100 mg Take 1 Cap by mouth two (2) times a day. Indications: NEUROPATHIC PAIN Quantity:  60 Cap Refills:  0 losartan 25 mg tablet Commonly known as:  COZAAR Your last dose was: Your next dose is:    
   
   
 Dose:  25 mg Take 1 Tab by mouth daily. Indications: Chronic Heart Failure, hypertension Quantity:  90 Tab Refills:  2  
     
   
   
   
  
 metoprolol tartrate 25 mg tablet Commonly known as:  LOPRESSOR Your last dose was: Your next dose is:    
   
   
 Dose:  12.5 mg Take 0.5 Tabs by mouth every twelve (12) hours. Indications: hypertension, VENTRICULAR RATE CONTROL IN ATRIAL FIBRILLATION Quantity:  30 Tab Refills:  6  
     
   
   
   
  
 * OTHER Your last dose was: Your next dose is:    
   
   
 Check PT, INR on 5/1/17- results to Cardiologist immediately. Diagnosis- A Fib Quantity:  1 Each Refills:  0  
     
   
   
   
  
 * OTHER Your last dose was: Your next dose is:    
   
   
 Check CBC, CMP, Mg on 5/1/17. Results to PCP immediately. Diagnosis- PAD Quantity:  1 Each Refills:  0  
     
   
   
   
  
 oxyCODONE-acetaminophen 5-325 mg per tablet Commonly known as:  PERCOCET Your last dose was: Your next dose is:    
   
   
 Dose:  1 Tab Take 1 Tab by mouth every six (6) hours as needed for Pain. Max Daily Amount: 4 Tabs. Quantity:  40 Tab Refills:  0  
     
   
   
   
  
 zinc sulfate 220 (50) mg capsule Commonly known as:  ZINCATE Your last dose was: Your next dose is:    
   
   
 Dose:  220 mg Take 1 Cap by mouth daily. Quantity:  90 Cap Refills:  0  
     
   
   
   
  
 * Notice: This list has 2 medication(s) that are the same as other medications prescribed for you. Read the directions carefully, and ask your doctor or other care provider to review them with you. Where to Get Your Medications These medications were sent to [facility] [facility], [address] [facility]  [address] [address] Hours:  24-hours Phone:  [phone]  
  trimethoprim-sulfamethoxazole  mg per tablet

## 2017-06-16 ENCOUNTER — HOME CARE VISIT (OUTPATIENT)
Dept: HOME HEALTH SERVICES | Facility: HOME HEALTH | Age: 57
End: 2017-06-16
Payer: COMMERCIAL

## 2017-06-16 ENCOUNTER — TELEPHONE ANTICOAG (OUTPATIENT)
Dept: CARDIOLOGY CLINIC | Age: 57
End: 2017-06-16

## 2017-06-16 ENCOUNTER — HOME CARE VISIT (OUTPATIENT)
Dept: SCHEDULING | Facility: HOME HEALTH | Age: 57
End: 2017-06-16
Payer: COMMERCIAL

## 2017-06-16 DIAGNOSIS — Z79.01 ANTICOAGULATED ON COUMADIN: ICD-10-CM

## 2017-06-16 DIAGNOSIS — I48.20 CHRONIC ATRIAL FIBRILLATION (HCC): ICD-10-CM

## 2017-06-16 LAB
ACT BLD: 175 SECS (ref 79–138)
INR, EXTERNAL: 1.5

## 2017-06-16 PROCEDURE — G0299 HHS/HOSPICE OF RN EA 15 MIN: HCPCS

## 2017-06-16 NOTE — PROGRESS NOTES
Verbal order and read back per Bennie Downs NP. The INR is below the therapeutic range. Please make the following adjustments to Coumadin dosing: Pt took 2.5mg this morning per Bennie Downs take additional 2.5mg. Then stay on Coumadin to 2.5 mg daily except 3.75 mg Mon and Wed. Repeat the INR in 1 week. Pt had angiogram yesterday with . Pt had been holding coumadin since Sunday, 6/11/17. Home health nurse stated that he took 2.5mg this morning when he woke up instead of taking it this evening.

## 2017-06-19 ENCOUNTER — OFFICE VISIT (OUTPATIENT)
Dept: VASCULAR SURGERY | Age: 57
End: 2017-06-19

## 2017-06-19 VITALS
OXYGEN SATURATION: 98 % | RESPIRATION RATE: 20 BRPM | SYSTOLIC BLOOD PRESSURE: 108 MMHG | HEART RATE: 64 BPM | DIASTOLIC BLOOD PRESSURE: 60 MMHG | TEMPERATURE: 98.3 F

## 2017-06-19 VITALS
DIASTOLIC BLOOD PRESSURE: 86 MMHG | SYSTOLIC BLOOD PRESSURE: 130 MMHG | WEIGHT: 160 LBS | HEIGHT: 70 IN | RESPIRATION RATE: 16 BRPM | HEART RATE: 85 BPM | BODY MASS INDEX: 22.9 KG/M2

## 2017-06-19 DIAGNOSIS — I74.09 AORTOILIAC OCCLUSIVE DISEASE (HCC): Chronic | ICD-10-CM

## 2017-06-19 DIAGNOSIS — I73.9 PERIPHERAL ARTERY DISEASE (HCC): Chronic | ICD-10-CM

## 2017-06-19 DIAGNOSIS — I70.213 ATHEROSCLEROSIS OF NATIVE ARTERY OF BOTH LOWER EXTREMITIES WITH INTERMITTENT CLAUDICATION (HCC): Chronic | ICD-10-CM

## 2017-06-19 RX ORDER — GABAPENTIN 100 MG/1
100 CAPSULE ORAL 2 TIMES DAILY
Qty: 60 CAP | Refills: 9 | Status: ON HOLD | OUTPATIENT
Start: 2017-06-19 | End: 2017-08-16

## 2017-06-19 RX ORDER — CLOPIDOGREL BISULFATE 75 MG/1
75 TABLET ORAL
Qty: 30 TAB | Refills: 3 | Status: SHIPPED | OUTPATIENT
Start: 2017-06-19 | End: 2017-07-12

## 2017-06-19 NOTE — PROGRESS NOTES
Deena Enamorado    Chief Complaint   Patient presents with    Leg Pain    Surgical Follow-up       History and Physical    Deena Enamorado is a 59 y.o. male who recently had aortobifemoral bypass then needing a resultant right femoral to above-knee popliteal artery bypass. He also just recently went angiogram.  This showed that he had likely size mismatch at these axillary artery to axillary graft. No major narrowing was identified. But he did have significant narrowing of his superficial femoral artery and had atherectomy and balloon angioplasty performed. This was done on the left leg. The right leg his bypass is perfectly open but it does show that the below-knee popliteal artery seems to have a lot of disease almost to occlusion. Difficult to fully understand as it is difficult to get contrast down all that way. I do believe that moving forward with a right lower extremity angiogram would be beneficial for him especially given the fact that he does have dry gangrene and ulcerations. He does have now new onset swelling of the left foot after his revascularization. But his left groin pain and discomfort when we saw him in the procedure area has slowly improved. And the swelling is not as tight. No fevers or chills.     Past Medical History:   Diagnosis Date    Acute blood loss as cause of postoperative anemia 4/4/2017    Anticoagulated on Coumadin     Aortoiliac occlusive disease (HCC) 1/25/2017    Atherosclerosis of native artery of both lower extremities with intermittent claudication ([address] 1/25/2017    Benign hypertensive heart disease with systolic congestive heart failure, NYHA class 2 ([address] 1/26/2015    Carotid artery disease (HCC)     Chronic atrial fibrillation (HCC)     Chronic systolic heart failure (HCC)     Chronic ulcer of right foot ([address] 4/4/2017    Coronary artery disease involving native coronary artery of native heart 3/15/2017    Successful stenting of Cx (Xience LESLEY) and RCA (Xience LESLEY) to 0% by Dr. Kendal Huynh on 3/15/17.     DDD (degenerative disc disease), lumbar 1/26/2015    Dyslipidemia     Erectile dysfunction 7/5/2016    Euthyroid sick syndrome 4/6/2017    Hereditary peripheral neuropathy 11/15/2016    History of cardioversion 4/18/2017    S/P Synchronized external cardioversion (4/18/2017 - Dr. Fracisco Rose)    Hyperuricemia     Ischemic cardiomyopathy     Peripheral artery disease ([facility] 1/25/2017    Spinal stenosis of lumbar region with radiculopathy 5/4/2015    Dr. Sally Ray Vitamin D deficiency 4/22/2017    Vitamin D 25-Hydroxy (4/22/2017) = 12.0     Past Surgical History:   Procedure Laterality Date    CARDIAC CATHETERIZATION  3/8/2017         CARDIAC CATHETERIZATION  3/15/2017         CORONARY STENT SINGLE W/PTCA  3/15/2017         HX COLONOSCOPY  07/23/2015    polyps repeat 2020 5 years , Priya Deedee    HX FEMORAL BYPASS Right 04/04/2017    S/P Right axillary to bifemoral artery bypass using an 8 mm Propaten graft from the right axilla to the right common femoral artery with a jump femoral-femoral bypass with another 8 mm Propaten external ring bypass; Right common femoral artery, and profunda femoris artery and superficial femoral artery endarterectomy causing an extensive surgery on the right side (4/4/2017 - Dr. Clarissa Crawford)    HX FEMORAL BYPASS Right 04/07/2017    S/P Cutdown of femoral-femoral bypass, more on the left groin side; Right lower extremity angiography with first-order catheterization (4/7/2017 - Dr. Toño Christensen)    HX FEMORAL BYPASS Right 04/10/2017    S/P Right femoral to above-knee popliteal bypass with an 8 mm PTFE graft (4/10/2017 - Dr. Karla Mejia)     Patient Active Problem List   Diagnosis Code    Benign hypertensive heart disease with systolic congestive heart failure, NYHA class 2 ([facility]) I11.0, I50.20    Vitamin B12 deficiency E53.8    DDD (degenerative disc disease), lumbar M51.36    Erectile dysfunction N52.9  Spinal stenosis of lumbar region with radiculopathy M48.06, M54.16    Hereditary peripheral neuropathy G60.9    Aortoiliac occlusive disease ([facility]) I74.09    Atherosclerosis of native artery of both lower extremities with intermittent claudication ([facility]) I70.213    Peripheral artery disease ([facility]) I73.9    Coronary artery disease involving native coronary artery of native heart I25.10    Chronic atrial fibrillation ([facility]) I48.2    Transient alteration of awareness R40.4    Acute blood loss as cause of postoperative anemia D62    Impaired mobility and ADLs Z74.09    Anticoagulated on Coumadin Z51.81, Z79.01    Ischemic cardiomyopathy I25.5    Chronic systolic heart failure ([facility]) I50.22    Carotid artery disease ([facility]) I77.9    Hyperammonemia ([facility]) E72.20    Dyslipidemia E78.5    Hyperuricemia E79.0    Euthyroid sick syndrome E07.81    Aftercare following surgery of the circulatory system Z48.812    Status post femoral-popliteal bypass surgery Z95.828    History of cardioversion Z98.890    Critical ischemia of lower extremity I99.8    Chronic ulcer of right foot ([facility]) L97.519    Vitamin D deficiency E55.9     Current Outpatient Prescriptions   Medication Sig Dispense Refill    trimethoprim-sulfamethoxazole (BACTRIM, SEPTRA)  mg per tablet Take 1 Tab by mouth two (2) times a day. 20 Tab 0    aspirin 81 mg chewable tablet Take 1 Tab by mouth daily (with breakfast). 90 Tab 3    cholecalciferol (VITAMIN D3) 1,000 unit tablet Take 1 Tab by mouth daily. 90 Tab 3    losartan (COZAAR) 25 mg tablet Take 1 Tab by mouth daily. Indications: Chronic Heart Failure, hypertension 90 Tab 2    ascorbic acid, vitamin C, (VITAMIN C) 250 mg tablet Take 1 Tab by mouth daily (with breakfast). 90 Tab 2    DULoxetine (CYMBALTA) 60 mg capsule Take 1 Cap by mouth daily. 90 Cap 2    zinc sulfate (ZINCATE) 220 (50) mg capsule Take 1 Cap by mouth daily.  90 Cap 0    ferrous sulfate 325 mg (65 mg iron) tablet Take 1 Tab by mouth daily (with breakfast). 90 Tab 3    atorvastatin (LIPITOR) 40 mg tablet Take 1 Tab by mouth nightly. Indications: DYSLIPIDEMIA 90 Tab 3    amiodarone (CORDARONE) 200 mg tablet Take 1 Tab by mouth two (2) times a day. Start taking in the morning of 5/3/17  Indications: VENTRICULAR RATE CONTROL IN ATRIAL FIBRILLATION 60 Tab 0    clopidogrel (PLAVIX) 75 mg tab Take 1 Tab by mouth daily (with dinner). 30 Tab 0    oxyCODONE-acetaminophen (PERCOCET) 5-325 mg per tablet Take 1 Tab by mouth every six (6) hours as needed for Pain. Max Daily Amount: 4 Tabs. 40 Tab 0    metoprolol tartrate (LOPRESSOR) 25 mg tablet Take 0.5 Tabs by mouth every twelve (12) hours. Indications: hypertension, VENTRICULAR RATE CONTROL IN ATRIAL FIBRILLATION 30 Tab 6    dilTIAZem (CARDIZEM) 30 mg tablet Take 1 Tab by mouth Before breakfast, lunch, dinner and at bedtime. Indications: hypertension 120 Tab 6    warfarin (COUMADIN) 2.5 mg tablet 2.5 mg po daily except on Tuesday Take 3.75 mg and then as per [facility]iology (Patient taking differently: Take 2.5 mg by mouth four (4) days a week. 6/16/17 TAKE ANOTHER 2.5 MG OF COUMADIN TODAY , THEN TAKE COUMADIN 2.5 MG DAILY EXCEPT ON MONDAY AND WEDNESDAY TAKE COUMADIN 3.75 MG) 30 Tab 6    gabapentin (NEURONTIN) 100 mg capsule Take 1 Cap by mouth two (2) times a day. Indications: NEUROPATHIC PAIN 60 Cap 0    OTHER Check PT, INR on 5/1/17- results to Cardiologist immediately. Diagnosis- A Fib 1 Each 0    OTHER Check CBC, CMP, Mg on 5/1/17. Results to PCP immediately. Diagnosis- PAD 1 Each 0    cyanocobalamin (VITAMIN B-12) 1,000 mcg tablet Take 1 Tab by mouth daily. 90 Tab 3     No Known Allergies  Social History     Social History    Marital status: LEGALLY      Spouse name: N/A    Number of children: N/A    Years of education: N/A     Occupational History    Not on file.      Social History Main Topics    Smoking status: Former Smoker    Smokeless tobacco: Never Used Comment: quit in 2011    Alcohol use 1.2 oz/week     2 Cans of beer per week      Comment: 10 cans of beer a month    Drug use: Yes     Special: Marijuana      Comment: occasionally    Sexual activity: Yes     Partners: Female     Other Topics Concern    Not on file     Social History Narrative      Family History   Problem Relation Age of Onset    Heart Disease Father        Physical Exam:    Visit Vitals    /86 (BP 1 Location: Right arm, BP Patient Position: Sitting)    Pulse 85    Resp 16    Ht 5' 10\" (1.778 m)    Wt 160 lb (72.6 kg)    BMI 22.96 kg/m2      General: Well-appearing male in no acute distress  HEENT: EOMI no scleral icterus is noted  Pulmonary: No increased work of breathing is noted  Extremities: Warm and well-perfused bilaterally. Patient does have 2+ edema of the left lower extremity with some minor swelling of the left groin but is definitely softer not as tight as previous. On the right lower extremity does have numerous small ulcerations mostly on the lateral calf. And ankle. Small one on the heel. And has some dry gangrene of the nailbed of the great toe. Neuro: Cranial nerves II through XII grossly intact    Impression and Plan:  Lizbeth Atkins is a 59 y.o. male with aortoiliac occlusive disease as well as significant peripheral arterial disease bilaterally. He has had a tough go of things with numerous surgeries. But he seems to be slowly improving. I do believe that performing a right lower extremity angiogram will be highly beneficial especially for his healing. We did go over all the risks and benefits of the procedure including but not limited to bleeding, infection, damage to adjacent structures, MI, stroke, death, need for further surgery, loss of lower extremity. They are understanding of the procedure and willing to move forward with that.   Once this is all done then we will need to perform new baseline imaging of his lower extremities as well as duplex and his grafts. We reviewed the plan with the patient and the patient understands. We also gave the patient appropriate instructions on their disease process and when to call back. Follow-up Disposition: Not on [facility], MD    PLEASE NOTE:  This document has been produced using voice recognition software. Unrecognized errors in transcription may be present.

## 2017-06-19 NOTE — MR AVS SNAPSHOT
Visit Information Date & Time Provider Department Dept. Phone Encounter #  
 6/19/2017 11:00 AM Carin Cushing, [facility] [phone] Follow-up Instructions Follow-up and Disposition History Your Appointments 6/28/2017  9:45 AM  
HOSPITAL DISCHARGE with Carin Cushing, MD  
[address] [place] [address] and [facility] [address] [place] [address]) Appt Note: DC BL angiogram; .; OFFICE RSCHED TO EARLIER TIME; .  
 [address] 200 [facility]  
[phone] [address]  
  
    
 6/30/2017 10:20 AM  
Follow Up with Dolly Sotelo MD  
[facility] ([facility]) Appt Note: 1 mth f/up; 6/8/17 - lmom to r/s june appt. provider out of office. Marine Godinez; 1 mth f/up/r/s'd with the patient's daughter- provider out of the office- [address] [place] [id number]  
[phone] [facility]  
  
    
 7/10/2017 10:30 AM  
HOSPITAL DISCHARGE with [address] [facility] ([address]) Appt Note: DC RLE angio; .  
 [address] 200 [facility]  
[phone] 2300 [facility] [place] 200 [facility] 7/14/2017  9:45 AM  
ROUTINE CARE with Andre Guidry MD  
[facility] ([address]) Appt Note: routine f/u 6wks [address] 200 [facility]  
[address] [facility] 200 [facility] Upcoming Health Maintenance Date Due Pneumococcal 19-64 Medium Risk (1 of 1 - PPSV23) 3/22/1979 INFLUENZA AGE 9 TO ADULT 8/1/2017 COLONOSCOPY 7/23/2020 DTaP/Tdap/Td series (2 - Td) 7/5/2026 Allergies as of 6/19/2017  Review Complete On: 6/19/2017 By: Carin Cushing, MD  
 No Known Allergies Current Immunizations  Reviewed on 7/5/2016 Name Date Tdap 7/5/2016 Not reviewed this visit You Were Diagnosed With   
  
 Codes Comments Aortoiliac occlusive disease (HCC)     ICD-10-CM: I74.09 
ICD-9-CM: 444.09 Atherosclerosis of native artery of both lower extremities with intermittent claudication ([facility]     ICD-10-CM: V77.978 ICD-9-CM: 440.21 Peripheral artery disease ([facility]     ICD-10-CM: I73.9 ICD-9-CM: 443. 9 Vitals BP Pulse Resp Height(growth percentile) Weight(growth percentile) BMI  
 130/86 (BP 1 Location: Right arm, BP Patient Position: Sitting) 85 16 5' 10\" (1.778 m) 160 lb (72.6 kg) 22.96 kg/m2 Smoking Status Former Smoker Vitals History BMI and BSA Data Body Mass Index Body Surface Area  
 22.96 kg/m 2 1.89 m 2 Preferred Pharmacy Pharmacy Name Phone [address] [facility], [id number] [place] [address] Your Updated Medication List  
  
   
This list is accurate as of: 6/19/17 12:16 PM.  Always use your most recent med list.  
  
  
  
  
 amiodarone 200 mg tablet Commonly known as:  CORDARONE Take 1 Tab by mouth two (2) times a day. Start taking in the morning of 5/3/17  Indications: VENTRICULAR RATE CONTROL IN ATRIAL FIBRILLATION  
  
 ascorbic acid (vitamin C) 250 mg tablet Commonly known as:  VITAMIN C Take 1 Tab by mouth daily (with breakfast). aspirin 81 mg chewable tablet Take 1 Tab by mouth daily (with breakfast). atorvastatin 40 mg tablet Commonly known as:  LIPITOR Take 1 Tab by mouth nightly. Indications: DYSLIPIDEMIA  
  
 cholecalciferol 1,000 unit tablet Commonly known as:  VITAMIN D3 Take 1 Tab by mouth daily. clopidogrel 75 mg Tab Commonly known as:  PLAVIX Take 1 Tab by mouth daily (with dinner). cyanocobalamin 1,000 mcg tablet Commonly known as:  VITAMIN B-12 Take 1 Tab by mouth daily. dilTIAZem 30 mg tablet Commonly known as:  CARDIZEM Take 1 Tab by mouth Before breakfast, lunch, dinner and at bedtime. Indications: hypertension DULoxetine 60 mg capsule Commonly known as:  CYMBALTA Take 1 Cap by mouth daily. ferrous sulfate 325 mg (65 mg iron) tablet Take 1 Tab by mouth daily (with breakfast). gabapentin 100 mg capsule Commonly known as:  NEURONTIN Take 1 Cap by mouth two (2) times a day. Indications: NEUROPATHIC PAIN  
  
 losartan 25 mg tablet Commonly known as:  COZAAR Take 1 Tab by mouth daily. Indications: Chronic Heart Failure, hypertension  
  
 metoprolol tartrate 25 mg tablet Commonly known as:  LOPRESSOR Take 0.5 Tabs by mouth every twelve (12) hours. Indications: hypertension, VENTRICULAR RATE CONTROL IN ATRIAL FIBRILLATION  
  
 * OTHER Check PT, INR on 5/1/17- results to Cardiologist immediately. Diagnosis- A Fib  
  
 * OTHER Check CBC, CMP, Mg on 5/1/17. Results to PCP immediately. Diagnosis- PAD  
  
 oxyCODONE-acetaminophen 5-325 mg per tablet Commonly known as:  PERCOCET Take 1 Tab by mouth every six (6) hours as needed for Pain. Max Daily Amount: 4 Tabs. trimethoprim-sulfamethoxazole  mg per tablet Commonly known as:  Sushant Retort Take 1 Tab by mouth two (2) times a day. warfarin 2.5 mg tablet Commonly known as:  COUMADIN  
2.5 mg po daily except on Tuesday Take 3.75 mg and then as per [facility] Cardiology  
  
 zinc sulfate 220 (50) mg capsule Commonly known as:  ZINCATE Take 1 Cap by mouth daily. * Notice: This list has 2 medication(s) that are the same as other medications prescribed for you. Read the directions carefully, and ask your doctor or other care provider to review them with you. To-Do List   
 06/21/2017 To Be Determined Appointment with Sheng Gunderson RN at [address] [facility]  
  
 06/23/2017 To Be Determined Appointment with Criss Guerrero RN at [address] [facility]  
  
 06/26/2017 To Be Determined Appointment with Criss Guerrero RN at [address] [facility]  
  
 06/28/2017 To Be Determined Appointment with Criss Guerrero RN at [address] [facility] Please provide this summary of care documentation to your next provider. Your primary care clinician is listed as Estela Robison. If you have any questions after today's visit, please call [phone].

## 2017-06-21 ENCOUNTER — HOME CARE VISIT (OUTPATIENT)
Dept: SCHEDULING | Facility: HOME HEALTH | Age: 57
End: 2017-06-21

## 2017-06-22 ENCOUNTER — OFFICE VISIT (OUTPATIENT)
Dept: VASCULAR SURGERY | Age: 57
End: 2017-06-22

## 2017-06-22 DIAGNOSIS — I70.213 ATHEROSCLEROSIS OF NATIVE ARTERY OF BOTH LOWER EXTREMITIES WITH INTERMITTENT CLAUDICATION (HCC): Primary | Chronic | ICD-10-CM

## 2017-06-22 NOTE — MR AVS SNAPSHOT
Visit Information Date & Time Provider Department Dept. Phone Encounter #  
 6/22/2017  1:45 PM [facility] NURSE [address] [facility] [id number] Your Appointments 6/30/2017 10:20 AM  
Follow Up with Jass Garvin MD  
[facility] ([facility]) Appt Note: 1 mth f/up; 6/8/17 - lmom to r/s june appt. provider out of office. Soy Haque; 1 mth f/up/r/s'd with the patient's daughter- provider out of the office- [address] [facility] [place] Arnoldo [phone]  
[phone] Abram Carr  
  
    
 7/10/2017 10:30 AM  
HOSPITAL DISCHARGE with [address], [address] [facility] ([facility]) Appt Note: DC RLE angio; .  
 [address] [place], [place] [address] 200 [facility]  
[phone] 12[address] [facility] 200 [facility] 7/14/2017  9:45 AM  
ROUTINE CARE with Jamey Hanson MD  
[facility] ([facility]) Appt Note: routine f/u 6wks [address] [facility]  
[address] [facility] 200 [facility] Upcoming Health Maintenance Date Due Pneumococcal 19-64 Medium Risk (1 of 1 - PPSV23) 3/22/1979 INFLUENZA AGE 9 TO ADULT 8/1/2017 COLONOSCOPY 7/23/2020 DTaP/Tdap/Td series (2 - Td) 7/5/2026 Allergies as of 6/22/2017  Review Complete On: 6/19/2017 By: Reece Mitchell MD  
 No Known Allergies Current Immunizations  Reviewed on 7/5/2016 Name Date Tdap 7/5/2016 Not reviewed this visit Vitals Smoking Status Former Smoker Preferred Pharmacy Pharmacy Name Phone [address] [facility], [address] [address] Xiao Your Updated Medication List  
  
   
 This list is accurate as of: 6/22/17  2:40 PM.  Always use your most recent med list.  
  
  
  
  
 amiodarone 200 mg tablet Commonly known as:  CORDARONE Take 1 Tab by mouth two (2) times a day. Start taking in the morning of 5/3/17  Indications: VENTRICULAR RATE CONTROL IN ATRIAL FIBRILLATION  
  
 ascorbic acid (vitamin C) 250 mg tablet Commonly known as:  VITAMIN C Take 1 Tab by mouth daily (with breakfast). aspirin 81 mg chewable tablet Take 1 Tab by mouth daily (with breakfast). atorvastatin 40 mg tablet Commonly known as:  LIPITOR Take 1 Tab by mouth nightly. Indications: DYSLIPIDEMIA  
  
 cholecalciferol 1,000 unit tablet Commonly known as:  VITAMIN D3 Take 1 Tab by mouth daily. clopidogrel 75 mg Tab Commonly known as:  PLAVIX Take 1 Tab by mouth daily (with dinner). cyanocobalamin 1,000 mcg tablet Commonly known as:  VITAMIN B-12 Take 1 Tab by mouth daily. dilTIAZem 30 mg tablet Commonly known as:  CARDIZEM Take 1 Tab by mouth Before breakfast, lunch, dinner and at bedtime. Indications: hypertension DULoxetine 60 mg capsule Commonly known as:  CYMBALTA Take 1 Cap by mouth daily. ferrous sulfate 325 mg (65 mg iron) tablet Take 1 Tab by mouth daily (with breakfast). gabapentin 100 mg capsule Commonly known as:  NEURONTIN Take 1 Cap by mouth two (2) times a day. Indications: NEUROPATHIC PAIN  
  
 losartan 25 mg tablet Commonly known as:  COZAAR Take 1 Tab by mouth daily. Indications: Chronic Heart Failure, hypertension  
  
 metoprolol tartrate 25 mg tablet Commonly known as:  LOPRESSOR Take 0.5 Tabs by mouth every twelve (12) hours. Indications: hypertension, VENTRICULAR RATE CONTROL IN ATRIAL FIBRILLATION  
  
 * OTHER Check PT, INR on 5/1/17- results to Cardiologist immediately. Diagnosis- A Fib  
  
 * OTHER Check CBC, CMP, Mg on 5/1/17. Results to PCP immediately.  Diagnosis- PAD  
  
 oxyCODONE-acetaminophen 5-325 mg per tablet Commonly known as:  PERCOCET Take 1 Tab by mouth every six (6) hours as needed for Pain. Max Daily Amount: 4 Tabs. trimethoprim-sulfamethoxazole  mg per tablet Commonly known as:  Roseann Carry Take 1 Tab by mouth two (2) times a day. warfarin 2.5 mg tablet Commonly known as:  COUMADIN  
2.5 mg po daily except on Tuesday Take 3.75 mg and then as per [facility] Cardiology  
  
 zinc sulfate 220 (50) mg capsule Commonly known as:  ZINCATE Take 1 Cap by mouth daily. * Notice: This list has 2 medication(s) that are the same as other medications prescribed for you. Read the directions carefully, and ask your doctor or other care provider to review them with you. To-Do List   
 06/23/2017 To Be Determined Appointment with Luis Antonio Garcia RN at [address] [place] [address] [facility]  
  
 06/26/2017 To Be Determined Appointment with Luis Antonio Garcia RN at [address] [place] [address] [facility]  
  
 06/28/2017 To Be Determined Appointment with Luis Antonio Garcia RN at [address] [place] [address] [facility]  
  
 06/29/2017 9:15 AM  
  Appointment with [facility] CAD NO ANESTHESIA; [facility] 2 CATH LAB/PERIPHERAL; [facility] CATH HOLDING at [facility] CATH LAB ([phone]) Please provide this summary of care documentation to your next provider. Your primary care clinician is listed as Josee Mcgrath. If you have any questions after today's visit, please call [phone].

## 2017-06-23 ENCOUNTER — HOME CARE VISIT (OUTPATIENT)
Dept: SCHEDULING | Facility: HOME HEALTH | Age: 57
End: 2017-06-23
Payer: COMMERCIAL

## 2017-06-23 ENCOUNTER — TELEPHONE ANTICOAG (OUTPATIENT)
Dept: CARDIOLOGY CLINIC | Age: 57
End: 2017-06-23

## 2017-06-23 DIAGNOSIS — I48.20 CHRONIC ATRIAL FIBRILLATION (HCC): ICD-10-CM

## 2017-06-23 DIAGNOSIS — Z79.01 ANTICOAGULATED ON COUMADIN: ICD-10-CM

## 2017-06-23 LAB — INR, EXTERNAL: 4.8

## 2017-06-23 PROCEDURE — G0299 HHS/HOSPICE OF RN EA 15 MIN: HCPCS

## 2017-06-23 NOTE — PROGRESS NOTES
Patient is is currently taking Bactrim 80mg/ 400mg BID .    Mr. Kade Tobias is scheduled for an angiogram with Dr. Omar Modi next week  Patient is ok to hold  Coumadin  5 days prior to procedure

## 2017-06-23 NOTE — PROGRESS NOTES
Verbal order and read back per Dr. Swapna Shelton  The INR is above the therapeutic range. Patient is taking Bactrim. Office was not notified until today. Ask the patient about bleeding complications.   Please make the following adjustments to Coumadin dosing: Begin holding Coumadin today for angiogram and restart when instructed by surgeon  Repeat the INR one week after restarting Coumadin  NKECHI Banks informed of instructions, read back and verbalized understanding Massiel Stein LPN

## 2017-06-26 ENCOUNTER — ANESTHESIA EVENT (OUTPATIENT)
Dept: CARDIOTHORACIC SURGERY | Age: 57
DRG: 253 | End: 2017-06-26
Payer: COMMERCIAL

## 2017-06-26 ENCOUNTER — HOME CARE VISIT (OUTPATIENT)
Dept: SCHEDULING | Facility: HOME HEALTH | Age: 57
End: 2017-06-26
Payer: COMMERCIAL

## 2017-06-26 VITALS
HEART RATE: 62 BPM | SYSTOLIC BLOOD PRESSURE: 127 MMHG | DIASTOLIC BLOOD PRESSURE: 68 MMHG | OXYGEN SATURATION: 96 % | TEMPERATURE: 98.5 F | RESPIRATION RATE: 14 BRPM

## 2017-06-26 VITALS — HEIGHT: 70 IN | WEIGHT: 165 LBS | BODY MASS INDEX: 23.62 KG/M2

## 2017-06-26 PROCEDURE — G0495 RN CARE TRAIN/EDU IN HH: HCPCS

## 2017-06-27 ENCOUNTER — HOSPITAL ENCOUNTER (INPATIENT)
Age: 57
LOS: 15 days | Discharge: HOME HEALTH | DRG: 253 | End: 2017-07-12
Attending: SURGERY | Admitting: SURGERY
Payer: COMMERCIAL

## 2017-06-27 ENCOUNTER — HOME CARE VISIT (OUTPATIENT)
Dept: HOME HEALTH SERVICES | Facility: HOME HEALTH | Age: 57
End: 2017-06-27
Payer: COMMERCIAL

## 2017-06-27 ENCOUNTER — ANESTHESIA (OUTPATIENT)
Dept: CARDIOTHORACIC SURGERY | Age: 57
DRG: 253 | End: 2017-06-27
Payer: COMMERCIAL

## 2017-06-27 VITALS
OXYGEN SATURATION: 95 % | HEART RATE: 58 BPM | DIASTOLIC BLOOD PRESSURE: 75 MMHG | SYSTOLIC BLOOD PRESSURE: 133 MMHG | TEMPERATURE: 98 F | RESPIRATION RATE: 20 BRPM

## 2017-06-27 PROBLEM — I72.4 PSEUDOANEURYSM OF FEMORAL ARTERY (HCC): Status: ACTIVE | Noted: 2017-06-27

## 2017-06-27 PROBLEM — I73.9 PAD (PERIPHERAL ARTERY DISEASE) (HCC): Status: ACTIVE | Noted: 2017-06-27

## 2017-06-27 LAB
AMPHET UR QL SCN: NEGATIVE
ANION GAP BLD CALC-SCNC: 6 MMOL/L (ref 3–18)
BARBITURATES UR QL SCN: NEGATIVE
BENZODIAZ UR QL: NEGATIVE
BUN BLD-MCNC: 8 MG/DL (ref 7–18)
BUN SERPL-MCNC: 7 MG/DL (ref 7–18)
BUN/CREAT SERPL: 10 (ref 12–20)
CALCIUM SERPL-MCNC: 8.1 MG/DL (ref 8.5–10.1)
CANNABINOIDS UR QL SCN: POSITIVE
CHLORIDE BLD-SCNC: 101 MMOL/L (ref 100–108)
CHLORIDE SERPL-SCNC: 103 MMOL/L (ref 100–108)
CO2 SERPL-SCNC: 25 MMOL/L (ref 21–32)
COCAINE UR QL SCN: NEGATIVE
CREAT SERPL-MCNC: 0.68 MG/DL (ref 0.6–1.3)
ERYTHROCYTE [DISTWIDTH] IN BLOOD BY AUTOMATED COUNT: 16.7 % (ref 11.6–14.5)
GLUCOSE BLD STRIP.AUTO-MCNC: 96 MG/DL (ref 74–106)
GLUCOSE SERPL-MCNC: 82 MG/DL (ref 74–99)
HCT VFR BLD AUTO: 19.5 % (ref 36–48)
HCT VFR BLD CALC: 27 % (ref 36–49)
HDSCOM,HDSCOM: ABNORMAL
HGB BLD-MCNC: 6.6 G/DL (ref 13–16)
HGB BLD-MCNC: 9.2 G/DL (ref 12–16)
MCH RBC QN AUTO: 26.5 PG (ref 24–34)
MCHC RBC AUTO-ENTMCNC: 33.8 G/DL (ref 31–37)
MCV RBC AUTO: 78.3 FL (ref 74–97)
METHADONE UR QL: NEGATIVE
OPIATES UR QL: NEGATIVE
PCP UR QL: NEGATIVE
PLATELET # BLD AUTO: 515 K/UL (ref 135–420)
PMV BLD AUTO: 8.6 FL (ref 9.2–11.8)
POTASSIUM BLD-SCNC: 5.1 MMOL/L (ref 3.5–5.5)
POTASSIUM SERPL-SCNC: 4.2 MMOL/L (ref 3.5–5.5)
RBC # BLD AUTO: 2.49 M/UL (ref 4.7–5.5)
SODIUM BLD-SCNC: 136 MMOL/L (ref 136–145)
SODIUM SERPL-SCNC: 134 MMOL/L (ref 136–145)
WBC # BLD AUTO: 12.7 K/UL (ref 4.6–13.2)

## 2017-06-27 PROCEDURE — 74011250636 HC RX REV CODE- 250/636: Performed by: ANESTHESIOLOGY

## 2017-06-27 PROCEDURE — 74011250637 HC RX REV CODE- 250/637: Performed by: NURSE ANESTHETIST, CERTIFIED REGISTERED

## 2017-06-27 PROCEDURE — 77030020782 HC GWN BAIR PAWS FLX 3M -B: Performed by: SURGERY

## 2017-06-27 PROCEDURE — 0W9F0ZZ DRAINAGE OF ABDOMINAL WALL, OPEN APPROACH: ICD-10-PCS | Performed by: SURGERY

## 2017-06-27 PROCEDURE — 77010033678 HC OXYGEN DAILY

## 2017-06-27 PROCEDURE — 0Y970ZZ DRAINAGE OF RIGHT FEMORAL REGION, OPEN APPROACH: ICD-10-PCS | Performed by: SURGERY

## 2017-06-27 PROCEDURE — 74011000250 HC RX REV CODE- 250

## 2017-06-27 PROCEDURE — 80307 DRUG TEST PRSMV CHEM ANLYZR: CPT | Performed by: SURGERY

## 2017-06-27 PROCEDURE — 65620000000 HC RM CCU GENERAL

## 2017-06-27 PROCEDURE — 74011250637 HC RX REV CODE- 250/637: Performed by: SURGERY

## 2017-06-27 PROCEDURE — 85027 COMPLETE CBC AUTOMATED: CPT | Performed by: SURGERY

## 2017-06-27 PROCEDURE — 74011250636 HC RX REV CODE- 250/636: Performed by: SURGERY

## 2017-06-27 PROCEDURE — 36415 COLL VENOUS BLD VENIPUNCTURE: CPT | Performed by: SURGERY

## 2017-06-27 PROCEDURE — 04WY0JZ REVISION OF SYNTHETIC SUBSTITUTE IN LOWER ARTERY, OPEN APPROACH: ICD-10-PCS | Performed by: SURGERY

## 2017-06-27 PROCEDURE — 74011250636 HC RX REV CODE- 250/636

## 2017-06-27 PROCEDURE — 77030011640 HC PAD GRND REM COVD -A: Performed by: SURGERY

## 2017-06-27 PROCEDURE — 82947 ASSAY GLUCOSE BLOOD QUANT: CPT

## 2017-06-27 PROCEDURE — 77030002996 HC SUT SLK J&J -A: Performed by: SURGERY

## 2017-06-27 PROCEDURE — 76010000129 HC CV SURG 1.5 TO 2 HR: Performed by: SURGERY

## 2017-06-27 PROCEDURE — 77030018836 HC SOL IRR NACL ICUM -A: Performed by: SURGERY

## 2017-06-27 PROCEDURE — 77030011265 HC ELECTRD BLD HEX COVD -A: Performed by: SURGERY

## 2017-06-27 PROCEDURE — 80048 BASIC METABOLIC PNL TOTAL CA: CPT | Performed by: SURGERY

## 2017-06-27 PROCEDURE — 77030019934 HC DRSG VAC ASST KCON -B: Performed by: SURGERY

## 2017-06-27 PROCEDURE — 74011250636 HC RX REV CODE- 250/636: Performed by: NURSE ANESTHETIST, CERTIFIED REGISTERED

## 2017-06-27 PROCEDURE — 74011000258 HC RX REV CODE- 258: Performed by: SURGERY

## 2017-06-27 PROCEDURE — 30233N1 TRANSFUSION OF NONAUTOLOGOUS RED BLOOD CELLS INTO PERIPHERAL VEIN, PERCUTANEOUS APPROACH: ICD-10-PCS | Performed by: SURGERY

## 2017-06-27 PROCEDURE — 76060000034 HC ANESTHESIA 1.5 TO 2 HR: Performed by: SURGERY

## 2017-06-27 RX ORDER — SODIUM CHLORIDE, SODIUM LACTATE, POTASSIUM CHLORIDE, CALCIUM CHLORIDE 600; 310; 30; 20 MG/100ML; MG/100ML; MG/100ML; MG/100ML
100 INJECTION, SOLUTION INTRAVENOUS CONTINUOUS
Status: DISCONTINUED | OUTPATIENT
Start: 2017-06-27 | End: 2017-06-28

## 2017-06-27 RX ORDER — CLOPIDOGREL BISULFATE 75 MG/1
75 TABLET ORAL
Status: DISCONTINUED | OUTPATIENT
Start: 2017-06-27 | End: 2017-07-12 | Stop reason: HOSPADM

## 2017-06-27 RX ORDER — AMIODARONE HYDROCHLORIDE 200 MG/1
200 TABLET ORAL 2 TIMES DAILY
Status: DISCONTINUED | OUTPATIENT
Start: 2017-06-27 | End: 2017-07-12 | Stop reason: HOSPADM

## 2017-06-27 RX ORDER — ASCORBIC ACID 250 MG
250 TABLET ORAL
Status: DISCONTINUED | OUTPATIENT
Start: 2017-06-28 | End: 2017-07-12 | Stop reason: HOSPADM

## 2017-06-27 RX ORDER — GUAIFENESIN 100 MG/5ML
81 LIQUID (ML) ORAL
Status: DISCONTINUED | OUTPATIENT
Start: 2017-06-28 | End: 2017-07-12 | Stop reason: HOSPADM

## 2017-06-27 RX ORDER — ONDANSETRON 2 MG/ML
INJECTION INTRAMUSCULAR; INTRAVENOUS AS NEEDED
Status: DISCONTINUED | OUTPATIENT
Start: 2017-06-27 | End: 2017-06-27 | Stop reason: HOSPADM

## 2017-06-27 RX ORDER — NALOXONE HYDROCHLORIDE 0.4 MG/ML
0.4 INJECTION, SOLUTION INTRAMUSCULAR; INTRAVENOUS; SUBCUTANEOUS AS NEEDED
Status: DISCONTINUED | OUTPATIENT
Start: 2017-06-27 | End: 2017-07-12 | Stop reason: HOSPADM

## 2017-06-27 RX ORDER — SODIUM CHLORIDE 0.9 % (FLUSH) 0.9 %
5-10 SYRINGE (ML) INJECTION AS NEEDED
Status: DISCONTINUED | OUTPATIENT
Start: 2017-06-27 | End: 2017-07-06 | Stop reason: SDUPTHER

## 2017-06-27 RX ORDER — LANOLIN ALCOHOL/MO/W.PET/CERES
325 CREAM (GRAM) TOPICAL
Status: DISCONTINUED | OUTPATIENT
Start: 2017-06-28 | End: 2017-07-12 | Stop reason: HOSPADM

## 2017-06-27 RX ORDER — ATORVASTATIN CALCIUM 40 MG/1
40 TABLET, FILM COATED ORAL
Status: DISCONTINUED | OUTPATIENT
Start: 2017-06-27 | End: 2017-07-12 | Stop reason: HOSPADM

## 2017-06-27 RX ORDER — FENTANYL CITRATE 50 UG/ML
INJECTION, SOLUTION INTRAMUSCULAR; INTRAVENOUS AS NEEDED
Status: DISCONTINUED | OUTPATIENT
Start: 2017-06-27 | End: 2017-06-27 | Stop reason: HOSPADM

## 2017-06-27 RX ORDER — SODIUM CHLORIDE 0.9 % (FLUSH) 0.9 %
5-10 SYRINGE (ML) INJECTION AS NEEDED
Status: DISCONTINUED | OUTPATIENT
Start: 2017-06-27 | End: 2017-06-27 | Stop reason: HOSPADM

## 2017-06-27 RX ORDER — MELATONIN
1000 DAILY
Status: DISCONTINUED | OUTPATIENT
Start: 2017-06-28 | End: 2017-07-12 | Stop reason: HOSPADM

## 2017-06-27 RX ORDER — GABAPENTIN 100 MG/1
100 CAPSULE ORAL 2 TIMES DAILY
Status: DISCONTINUED | OUTPATIENT
Start: 2017-06-27 | End: 2017-07-12 | Stop reason: HOSPADM

## 2017-06-27 RX ORDER — FLUMAZENIL 0.1 MG/ML
INJECTION INTRAVENOUS
Status: COMPLETED
Start: 2017-06-27 | End: 2017-06-27

## 2017-06-27 RX ORDER — SODIUM CHLORIDE 0.9 % (FLUSH) 0.9 %
5-10 SYRINGE (ML) INJECTION EVERY 8 HOURS
Status: DISCONTINUED | OUTPATIENT
Start: 2017-06-27 | End: 2017-07-12 | Stop reason: HOSPADM

## 2017-06-27 RX ORDER — INSULIN LISPRO 100 [IU]/ML
INJECTION, SOLUTION INTRAVENOUS; SUBCUTANEOUS ONCE
Status: DISCONTINUED | OUTPATIENT
Start: 2017-06-27 | End: 2017-06-27 | Stop reason: HOSPADM

## 2017-06-27 RX ORDER — SODIUM CHLORIDE 0.9 % (FLUSH) 0.9 %
5-10 SYRINGE (ML) INJECTION EVERY 8 HOURS
Status: DISCONTINUED | OUTPATIENT
Start: 2017-06-27 | End: 2017-06-27 | Stop reason: HOSPADM

## 2017-06-27 RX ORDER — PROPOFOL 10 MG/ML
INJECTION, EMULSION INTRAVENOUS AS NEEDED
Status: DISCONTINUED | OUTPATIENT
Start: 2017-06-27 | End: 2017-06-27 | Stop reason: HOSPADM

## 2017-06-27 RX ORDER — HEPARIN SODIUM 5000 [USP'U]/ML
5000 INJECTION, SOLUTION INTRAVENOUS; SUBCUTANEOUS EVERY 8 HOURS
Status: DISCONTINUED | OUTPATIENT
Start: 2017-06-27 | End: 2017-07-11

## 2017-06-27 RX ORDER — MIDAZOLAM HYDROCHLORIDE 1 MG/ML
INJECTION, SOLUTION INTRAMUSCULAR; INTRAVENOUS AS NEEDED
Status: DISCONTINUED | OUTPATIENT
Start: 2017-06-27 | End: 2017-06-27 | Stop reason: HOSPADM

## 2017-06-27 RX ORDER — FAMOTIDINE 20 MG/1
20 TABLET, FILM COATED ORAL ONCE
Status: COMPLETED | OUTPATIENT
Start: 2017-06-27 | End: 2017-06-27

## 2017-06-27 RX ORDER — LANOLIN ALCOHOL/MO/W.PET/CERES
1000 CREAM (GRAM) TOPICAL DAILY
Status: DISCONTINUED | OUTPATIENT
Start: 2017-06-28 | End: 2017-07-12 | Stop reason: HOSPADM

## 2017-06-27 RX ORDER — OXYCODONE AND ACETAMINOPHEN 5; 325 MG/1; MG/1
1 TABLET ORAL
Status: DISCONTINUED | OUTPATIENT
Start: 2017-06-27 | End: 2017-07-06

## 2017-06-27 RX ORDER — LOSARTAN POTASSIUM 25 MG/1
25 TABLET ORAL DAILY
Status: DISCONTINUED | OUTPATIENT
Start: 2017-06-28 | End: 2017-07-12 | Stop reason: HOSPADM

## 2017-06-27 RX ORDER — DULOXETIN HYDROCHLORIDE 60 MG/1
60 CAPSULE, DELAYED RELEASE ORAL DAILY
Status: DISCONTINUED | OUTPATIENT
Start: 2017-06-28 | End: 2017-07-12 | Stop reason: HOSPADM

## 2017-06-27 RX ORDER — DOCUSATE SODIUM 100 MG/1
100 CAPSULE, LIQUID FILLED ORAL 2 TIMES DAILY
Status: DISCONTINUED | OUTPATIENT
Start: 2017-06-27 | End: 2017-07-12 | Stop reason: HOSPADM

## 2017-06-27 RX ORDER — ONDANSETRON 2 MG/ML
4 INJECTION INTRAMUSCULAR; INTRAVENOUS
Status: DISCONTINUED | OUTPATIENT
Start: 2017-06-27 | End: 2017-07-12 | Stop reason: HOSPADM

## 2017-06-27 RX ORDER — METOPROLOL TARTRATE 25 MG/1
12.5 TABLET, FILM COATED ORAL EVERY 12 HOURS
Status: DISCONTINUED | OUTPATIENT
Start: 2017-06-27 | End: 2017-07-12 | Stop reason: HOSPADM

## 2017-06-27 RX ORDER — SODIUM CHLORIDE, SODIUM LACTATE, POTASSIUM CHLORIDE, CALCIUM CHLORIDE 600; 310; 30; 20 MG/100ML; MG/100ML; MG/100ML; MG/100ML
75 INJECTION, SOLUTION INTRAVENOUS CONTINUOUS
Status: DISCONTINUED | OUTPATIENT
Start: 2017-06-27 | End: 2017-06-27 | Stop reason: HOSPADM

## 2017-06-27 RX ORDER — FENTANYL CITRATE 50 UG/ML
50 INJECTION, SOLUTION INTRAMUSCULAR; INTRAVENOUS
Status: DISCONTINUED | OUTPATIENT
Start: 2017-06-27 | End: 2017-06-30

## 2017-06-27 RX ORDER — CEFAZOLIN SODIUM 2 G/50ML
2 SOLUTION INTRAVENOUS ONCE
Status: COMPLETED | OUTPATIENT
Start: 2017-06-27 | End: 2017-06-27

## 2017-06-27 RX ORDER — ACETAMINOPHEN 325 MG/1
650 TABLET ORAL
Status: DISCONTINUED | OUTPATIENT
Start: 2017-06-27 | End: 2017-07-12 | Stop reason: HOSPADM

## 2017-06-27 RX ORDER — HEPARIN SODIUM 200 [USP'U]/100ML
INJECTION, SOLUTION INTRAVENOUS
Status: DISCONTINUED | OUTPATIENT
Start: 2017-06-27 | End: 2017-06-27 | Stop reason: HOSPADM

## 2017-06-27 RX ORDER — ONDANSETRON 2 MG/ML
4 INJECTION INTRAMUSCULAR; INTRAVENOUS ONCE
Status: ACTIVE | OUTPATIENT
Start: 2017-06-27 | End: 2017-06-28

## 2017-06-27 RX ORDER — HEPARIN SODIUM 200 [USP'U]/100ML
INJECTION, SOLUTION INTRAVENOUS
Status: DISCONTINUED
Start: 2017-06-27 | End: 2017-06-27

## 2017-06-27 RX ORDER — MORPHINE SULFATE 2 MG/ML
2 INJECTION, SOLUTION INTRAMUSCULAR; INTRAVENOUS
Status: DISCONTINUED | OUTPATIENT
Start: 2017-06-27 | End: 2017-07-12 | Stop reason: HOSPADM

## 2017-06-27 RX ORDER — EPHEDRINE SULFATE/0.9% NACL/PF 25 MG/5 ML
SYRINGE (ML) INTRAVENOUS AS NEEDED
Status: DISCONTINUED | OUTPATIENT
Start: 2017-06-27 | End: 2017-06-27 | Stop reason: HOSPADM

## 2017-06-27 RX ORDER — ZINC SULFATE 50(220)MG
220 CAPSULE ORAL DAILY
Status: DISCONTINUED | OUTPATIENT
Start: 2017-06-28 | End: 2017-07-03

## 2017-06-27 RX ORDER — DILTIAZEM HYDROCHLORIDE 30 MG/1
30 TABLET, FILM COATED ORAL
Status: DISCONTINUED | OUTPATIENT
Start: 2017-06-27 | End: 2017-07-12 | Stop reason: HOSPADM

## 2017-06-27 RX ORDER — LIDOCAINE HYDROCHLORIDE 10 MG/ML
0.1 INJECTION, SOLUTION EPIDURAL; INFILTRATION; INTRACAUDAL; PERINEURAL AS NEEDED
Status: DISCONTINUED | OUTPATIENT
Start: 2017-06-27 | End: 2017-06-27 | Stop reason: HOSPADM

## 2017-06-27 RX ADMIN — FENTANYL CITRATE 50 MCG: 50 INJECTION, SOLUTION INTRAMUSCULAR; INTRAVENOUS at 14:57

## 2017-06-27 RX ADMIN — SODIUM CHLORIDE, SODIUM LACTATE, POTASSIUM CHLORIDE, AND CALCIUM CHLORIDE 75 ML/HR: 600; 310; 30; 20 INJECTION, SOLUTION INTRAVENOUS at 11:42

## 2017-06-27 RX ADMIN — GABAPENTIN 100 MG: 100 CAPSULE ORAL at 18:24

## 2017-06-27 RX ADMIN — FENTANYL CITRATE 50 MCG: 50 INJECTION, SOLUTION INTRAMUSCULAR; INTRAVENOUS at 15:51

## 2017-06-27 RX ADMIN — CLOPIDOGREL 75 MG: 75 TABLET, FILM COATED ORAL at 18:24

## 2017-06-27 RX ADMIN — AMIODARONE HYDROCHLORIDE 200 MG: 200 TABLET ORAL at 18:24

## 2017-06-27 RX ADMIN — Medication 10 ML: at 18:28

## 2017-06-27 RX ADMIN — DOCUSATE SODIUM 100 MG: 100 CAPSULE, LIQUID FILLED ORAL at 18:24

## 2017-06-27 RX ADMIN — CEFAZOLIN SODIUM 2 G: 2 SOLUTION INTRAVENOUS at 14:55

## 2017-06-27 RX ADMIN — ONDANSETRON 4 MG: 2 INJECTION INTRAMUSCULAR; INTRAVENOUS at 15:44

## 2017-06-27 RX ADMIN — MIDAZOLAM HYDROCHLORIDE 2 MG: 1 INJECTION, SOLUTION INTRAMUSCULAR; INTRAVENOUS at 14:51

## 2017-06-27 RX ADMIN — FAMOTIDINE 20 MG: 20 TABLET ORAL at 11:26

## 2017-06-27 RX ADMIN — FENTANYL CITRATE 50 MCG: 50 INJECTION, SOLUTION INTRAMUSCULAR; INTRAVENOUS at 15:05

## 2017-06-27 RX ADMIN — SODIUM CHLORIDE, SODIUM LACTATE, POTASSIUM CHLORIDE, AND CALCIUM CHLORIDE 100 ML/HR: 600; 310; 30; 20 INJECTION, SOLUTION INTRAVENOUS at 17:00

## 2017-06-27 RX ADMIN — Medication 10 MG: at 15:32

## 2017-06-27 RX ADMIN — Medication 10 MG: at 15:37

## 2017-06-27 RX ADMIN — Medication 10 ML: at 11:40

## 2017-06-27 RX ADMIN — FLUMAZENIL: 0.1 INJECTION, SOLUTION INTRAVENOUS at 17:00

## 2017-06-27 RX ADMIN — SODIUM CHLORIDE, SODIUM LACTATE, POTASSIUM CHLORIDE, AND CALCIUM CHLORIDE 100 ML/HR: 600; 310; 30; 20 INJECTION, SOLUTION INTRAVENOUS at 21:04

## 2017-06-27 RX ADMIN — PROPOFOL 200 MG: 10 INJECTION, EMULSION INTRAVENOUS at 14:57

## 2017-06-27 RX ADMIN — OXYCODONE AND ACETAMINOPHEN 1 TABLET: 5; 325 TABLET ORAL at 18:25

## 2017-06-27 RX ADMIN — SODIUM CHLORIDE 1 G: 900 INJECTION, SOLUTION INTRAVENOUS at 23:49

## 2017-06-27 RX ADMIN — ATORVASTATIN CALCIUM 40 MG: 40 TABLET, FILM COATED ORAL at 21:07

## 2017-06-27 RX ADMIN — Medication 10 ML: at 22:00

## 2017-06-27 RX ADMIN — FENTANYL CITRATE 50 MCG: 50 INJECTION INTRAMUSCULAR; INTRAVENOUS at 16:52

## 2017-06-27 RX ADMIN — FENTANYL CITRATE 50 MCG: 50 INJECTION, SOLUTION INTRAMUSCULAR; INTRAVENOUS at 15:18

## 2017-06-27 RX ADMIN — FENTANYL CITRATE 50 MCG: 50 INJECTION, SOLUTION INTRAMUSCULAR; INTRAVENOUS at 15:39

## 2017-06-27 RX ADMIN — Medication 5 MG: at 15:26

## 2017-06-27 RX ADMIN — FENTANYL CITRATE 50 MCG: 50 INJECTION, SOLUTION INTRAMUSCULAR; INTRAVENOUS at 15:11

## 2017-06-27 RX ADMIN — Medication: at 16:00

## 2017-06-27 RX ADMIN — HEPARIN SODIUM 5000 UNITS: 5000 INJECTION, SOLUTION INTRAVENOUS; SUBCUTANEOUS at 18:25

## 2017-06-27 NOTE — PROGRESS NOTES
Bedside and Verbal shift change report given to Concepción Moser RN  (oncoming nurse) by Ulysses Whiteside RN (offgoing nurse). Report included the following information SBAR, Kardex, Procedure Summary, Intake/Output, Recent Results and Cardiac Rhythm NSR.

## 2017-06-27 NOTE — IP AVS SNAPSHOT
Current Discharge Medication List  
  
CONTINUE these medications which have CHANGED Dose & Instructions Dispensing Information Comments Morning Noon Evening Bedtime  
 oxyCODONE-acetaminophen 5-325 mg per tablet Commonly known as:  PERCOCET What changed:   
- how much to take - when to take this 
- reasons to take this Your last dose was: Your next dose is:    
   
   
 Dose:  2 Tab Take 2 Tabs by mouth every four (4) hours as needed. Max Daily Amount: 12 Tabs. Quantity:  60 Tab Refills:  0  
     
   
   
   
  
 warfarin 2.5 mg tablet Commonly known as:  COUMADIN What changed:   
- how much to take 
- how to take this 
- additional instructions Your last dose was: Your next dose is:    
   
   
 2.5 mg po daily except on Tuesday Take 3.75 mg and then as per [facility] Quantity:  30 Tab Refills:  6 CONTINUE these medications which have NOT CHANGED Dose & Instructions Dispensing Information Comments Morning Noon Evening Bedtime  
 amiodarone 200 mg tablet Commonly known as:  CORDARONE Your last dose was: Your next dose is:    
   
   
 Dose:  200 mg Take 1 Tab by mouth two (2) times a day. 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  
     
   
   
   
  
 ascorbic acid (vitamin C) 250 mg tablet Commonly known as:  VITAMIN C Your last dose was: Your next dose is:    
   
   
 Dose:  250 mg Take 1 Tab by mouth daily (with breakfast). Quantity:  90 Tab Refills:  2  
     
   
   
   
  
 aspirin 81 mg chewable tablet Your last dose was: Your next dose is:    
   
   
 Dose:  81 mg Take 1 Tab by mouth daily (with breakfast). Quantity:  90 Tab Refills:  3  
     
   
   
   
  
 atorvastatin 40 mg tablet Commonly known as:  LIPITOR Your last dose was: Your next dose is:    
   
   
 Dose:  40 mg Take 1 Tab by mouth nightly. Indications: DYSLIPIDEMIA Quantity:  90 Tab Refills:  3 cholecalciferol 1,000 unit tablet Commonly known as:  VITAMIN D3 Your last dose was: Your next dose is:    
   
   
 Dose:  1000 Units Take 1 Tab by mouth daily. Quantity:  90 Tab Refills:  3  
     
   
   
   
  
 cyanocobalamin 1,000 mcg tablet Commonly known as:  VITAMIN B-12 Your last dose was: Your next dose is:    
   
   
 Dose:  1000 mcg Take 1 Tab by mouth daily. Quantity:  90 Tab Refills:  3  
     
   
   
   
  
 dilTIAZem 30 mg tablet Commonly known as:  CARDIZEM Your last dose was: Your next dose is:    
   
   
 Dose:  30 mg Take 1 Tab by mouth Before breakfast, lunch, dinner and at bedtime. Indications: hypertension Quantity:  120 Tab Refills:  6 DULoxetine 60 mg capsule Commonly known as:  CYMBALTA Your last dose was: Your next dose is:    
   
   
 Dose:  60 mg Take 1 Cap by mouth daily. Quantity:  90 Cap Refills:  2  
     
   
   
   
  
 ferrous sulfate 325 mg (65 mg iron) tablet Your last dose was: Your next dose is:    
   
   
 Dose:  325 mg Take 1 Tab by mouth daily (with breakfast). Quantity:  90 Tab Refills:  3  
     
   
   
   
  
 gabapentin 100 mg capsule Commonly known as:  NEURONTIN Your last dose was: Your next dose is:    
   
   
 Dose:  100 mg Take 1 Cap by mouth two (2) times a day. Indications: NEUROPATHIC PAIN Quantity:  60 Cap Refills:  9  
     
   
   
   
  
 losartan 25 mg tablet Commonly known as:  COZAAR Your last dose was: Your next dose is:    
   
   
 Dose:  25 mg Take 1 Tab by mouth daily. Indications: Chronic Heart Failure, hypertension Quantity:  90 Tab Refills:  2 metoprolol tartrate 25 mg tablet Commonly known as:  LOPRESSOR Your last dose was: Your next dose is:    
   
   
 Dose:  12.5 mg Take 0.5 Tabs by mouth every twelve (12) hours. Indications: hypertension, VENTRICULAR RATE CONTROL IN ATRIAL FIBRILLATION Quantity:  30 Tab Refills:  6  
     
   
   
   
  
 zinc sulfate 220 (50) mg capsule Commonly known as:  ZINCATE Your last dose was: Your next dose is:    
   
   
 Dose:  220 mg Take 1 Cap by mouth daily. Quantity:  90 Cap Refills:  0 STOP taking these medications   
 clopidogrel 75 mg Tab Commonly known as:  PLAVIX  
   
  
 OTHER  
   
  
 trimethoprim-sulfamethoxazole  mg per tablet Commonly known as:  León Sexton Where to Get Your Medications Information on where to get these meds will be given to you by the nurse or doctor. ! Ask your nurse or doctor about these medications  
  oxyCODONE-acetaminophen 5-325 mg per tablet

## 2017-06-27 NOTE — H&P (VIEW-ONLY)
Marcella Palmer    Chief Complaint   Patient presents with    Leg Pain    Surgical Follow-up       History and Physical    Marcella Palmer is a 59 y.o. male who recently had aortobifemoral bypass then needing a resultant right femoral to above-knee popliteal artery bypass. He also just recently went angiogram.  This showed that he had likely size mismatch at these axillary artery to axillary graft. No major narrowing was identified. But he did have significant narrowing of his superficial femoral artery and had atherectomy and balloon angioplasty performed. This was done on the left leg. The right leg his bypass is perfectly open but it does show that the below-knee popliteal artery seems to have a lot of disease almost to occlusion. Difficult to fully understand as it is difficult to get contrast down all that way. I do believe that moving forward with a right lower extremity angiogram would be beneficial for him especially given the fact that he does have dry gangrene and ulcerations. He does have now new onset swelling of the left foot after his revascularization. But his left groin pain and discomfort when we saw him in the procedure area has slowly improved. And the swelling is not as tight. No fevers or chills.     Past Medical History:   Diagnosis Date    Acute blood loss as cause of postoperative anemia 4/4/2017    Anticoagulated on Coumadin     Aortoiliac occlusive disease (HCC) 1/25/2017    Atherosclerosis of native artery of both lower extremities with intermittent claudication ([address] 1/25/2017    Benign hypertensive heart disease with systolic congestive heart failure, NYHA class 2 ([address] 1/26/2015    Carotid artery disease (HCC)     Chronic atrial fibrillation (HCC)     Chronic systolic heart failure (HCC)     Chronic ulcer of right foot ([address] 4/4/2017    Coronary artery disease involving native coronary artery of native heart 3/15/2017    Successful stenting of Cx (Xience LESLEY) and RCA (Xience LESLEY) to 0% by Dr. Felix Roman on 3/15/17.     DDD (degenerative disc disease), lumbar 1/26/2015    Dyslipidemia     Erectile dysfunction 7/5/2016    Euthyroid sick syndrome 4/6/2017    Hereditary peripheral neuropathy 11/15/2016    History of cardioversion 4/18/2017    S/P Synchronized external cardioversion (4/18/2017 - Dr. Anne Nguyen)    Hyperuricemia     Ischemic cardiomyopathy     Peripheral artery disease ([facility] 1/25/2017    Spinal stenosis of lumbar region with radiculopathy 5/4/2015    Dr. Dmitry Moreau Vitamin D deficiency 4/22/2017    Vitamin D 25-Hydroxy (4/22/2017) = 12.0     Past Surgical History:   Procedure Laterality Date    CARDIAC CATHETERIZATION  3/8/2017         CARDIAC CATHETERIZATION  3/15/2017         CORONARY STENT SINGLE W/PTCA  3/15/2017         HX COLONOSCOPY  07/23/2015    polyps repeat 2020 5 years Jay Henderson    HX FEMORAL BYPASS Right 04/04/2017    S/P Right axillary to bifemoral artery bypass using an 8 mm Propaten graft from the right axilla to the right common femoral artery with a jump femoral-femoral bypass with another 8 mm Propaten external ring bypass; Right common femoral artery, and profunda femoris artery and superficial femoral artery endarterectomy causing an extensive surgery on the right side (4/4/2017 - Dr. Caden Zarate)    HX FEMORAL BYPASS Right 04/07/2017    S/P Cutdown of femoral-femoral bypass, more on the left groin side; Right lower extremity angiography with first-order catheterization (4/7/2017 - Dr. Garrett Pompa)    HX FEMORAL BYPASS Right 04/10/2017    S/P Right femoral to above-knee popliteal bypass with an 8 mm PTFE graft (4/10/2017 - Dr. Micaela Lala)     Patient Active Problem List   Diagnosis Code    Benign hypertensive heart disease with systolic congestive heart failure, NYHA class 2 ([facility]) I11.0, I50.20    Vitamin B12 deficiency E53.8    DDD (degenerative disc disease), lumbar M51.36    Erectile dysfunction N52.9  Spinal stenosis of lumbar region with radiculopathy M48.06, M54.16    Hereditary peripheral neuropathy G60.9    Aortoiliac occlusive disease ([facility]) I74.09    Atherosclerosis of native artery of both lower extremities with intermittent claudication ([facility]) I70.213    Peripheral artery disease ([facility]) I73.9    Coronary artery disease involving native coronary artery of native heart I25.10    Chronic atrial fibrillation ([facility]) I48.2    Transient alteration of awareness R40.4    Acute blood loss as cause of postoperative anemia D62    Impaired mobility and ADLs Z74.09    Anticoagulated on Coumadin Z51.81, Z79.01    Ischemic cardiomyopathy I25.5    Chronic systolic heart failure ([facility]) I50.22    Carotid artery disease ([facility]) I77.9    Hyperammonemia ([facility]) E72.20    Dyslipidemia E78.5    Hyperuricemia E79.0    Euthyroid sick syndrome E07.81    Aftercare following surgery of the circulatory system Z48.812    Status post femoral-popliteal bypass surgery Z95.828    History of cardioversion Z98.890    Critical ischemia of lower extremity I99.8    Chronic ulcer of right foot ([facility]) L97.519    Vitamin D deficiency E55.9     Current Outpatient Prescriptions   Medication Sig Dispense Refill    trimethoprim-sulfamethoxazole (BACTRIM, SEPTRA)  mg per tablet Take 1 Tab by mouth two (2) times a day. 20 Tab 0    aspirin 81 mg chewable tablet Take 1 Tab by mouth daily (with breakfast). 90 Tab 3    cholecalciferol (VITAMIN D3) 1,000 unit tablet Take 1 Tab by mouth daily. 90 Tab 3    losartan (COZAAR) 25 mg tablet Take 1 Tab by mouth daily. Indications: Chronic Heart Failure, hypertension 90 Tab 2    ascorbic acid, vitamin C, (VITAMIN C) 250 mg tablet Take 1 Tab by mouth daily (with breakfast). 90 Tab 2    DULoxetine (CYMBALTA) 60 mg capsule Take 1 Cap by mouth daily. 90 Cap 2    zinc sulfate (ZINCATE) 220 (50) mg capsule Take 1 Cap by mouth daily.  90 Cap 0    ferrous sulfate 325 mg (65 mg iron) tablet Take 1 Tab by mouth daily (with breakfast). 90 Tab 3    atorvastatin (LIPITOR) 40 mg tablet Take 1 Tab by mouth nightly. Indications: DYSLIPIDEMIA 90 Tab 3    amiodarone (CORDARONE) 200 mg tablet Take 1 Tab by mouth two (2) times a day. Start taking in the morning of 5/3/17  Indications: VENTRICULAR RATE CONTROL IN ATRIAL FIBRILLATION 60 Tab 0    clopidogrel (PLAVIX) 75 mg tab Take 1 Tab by mouth daily (with dinner). 30 Tab 0    oxyCODONE-acetaminophen (PERCOCET) 5-325 mg per tablet Take 1 Tab by mouth every six (6) hours as needed for Pain. Max Daily Amount: 4 Tabs. 40 Tab 0    metoprolol tartrate (LOPRESSOR) 25 mg tablet Take 0.5 Tabs by mouth every twelve (12) hours. Indications: hypertension, VENTRICULAR RATE CONTROL IN ATRIAL FIBRILLATION 30 Tab 6    dilTIAZem (CARDIZEM) 30 mg tablet Take 1 Tab by mouth Before breakfast, lunch, dinner and at bedtime. Indications: hypertension 120 Tab 6    warfarin (COUMADIN) 2.5 mg tablet 2.5 mg po daily except on Tuesday Take 3.75 mg and then as per [facility] (Patient taking differently: Take 2.5 mg by mouth four (4) days a week. 6/16/17 TAKE ANOTHER 2.5 MG OF COUMADIN TODAY , THEN TAKE COUMADIN 2.5 MG DAILY EXCEPT ON MONDAY AND WEDNESDAY TAKE COUMADIN 3.75 MG) 30 Tab 6    gabapentin (NEURONTIN) 100 mg capsule Take 1 Cap by mouth two (2) times a day. Indications: NEUROPATHIC PAIN 60 Cap 0    OTHER Check PT, INR on 5/1/17- results to Cardiologist immediately. Diagnosis- A Fib 1 Each 0    OTHER Check CBC, CMP, Mg on 5/1/17. Results to PCP immediately. Diagnosis- PAD 1 Each 0    cyanocobalamin (VITAMIN B-12) 1,000 mcg tablet Take 1 Tab by mouth daily. 90 Tab 3     No Known Allergies  Social History     Social History    Marital status: LEGALLY      Spouse name: N/A    Number of children: N/A    Years of education: N/A     Occupational History    Not on file.      Social History Main Topics    Smoking status: Former Smoker    Smokeless tobacco: Never Used Comment: quit in 2011    Alcohol use 1.2 oz/week     2 Cans of beer per week      Comment: 10 cans of beer a month    Drug use: Yes     Special: Marijuana      Comment: occasionally    Sexual activity: Yes     Partners: Female     Other Topics Concern    Not on file     Social History Narrative      Family History   Problem Relation Age of Onset    Heart Disease Father        Physical Exam:    Visit Vitals    /86 (BP 1 Location: Right arm, BP Patient Position: Sitting)    Pulse 85    Resp 16    Ht 5' 10\" (1.778 m)    Wt 160 lb (72.6 kg)    BMI 22.96 kg/m2      General: Well-appearing male in no acute distress  HEENT: EOMI no scleral icterus is noted  Pulmonary: No increased work of breathing is noted  Extremities: Warm and well-perfused bilaterally. Patient does have 2+ edema of the left lower extremity with some minor swelling of the left groin but is definitely softer not as tight as previous. On the right lower extremity does have numerous small ulcerations mostly on the lateral calf. And ankle. Small one on the heel. And has some dry gangrene of the nailbed of the great toe. Neuro: Cranial nerves II through XII grossly intact    Impression and Plan:  Reagan Floyd is a 59 y.o. male with aortoiliac occlusive disease as well as significant peripheral arterial disease bilaterally. He has had a tough go of things with numerous surgeries. But he seems to be slowly improving. I do believe that performing a right lower extremity angiogram will be highly beneficial especially for his healing. We did go over all the risks and benefits of the procedure including but not limited to bleeding, infection, damage to adjacent structures, MI, stroke, death, need for further surgery, loss of lower extremity. They are understanding of the procedure and willing to move forward with that.   Once this is all done then we will need to perform new baseline imaging of his lower extremities as well as duplex and his grafts. We reviewed the plan with the patient and the patient understands. We also gave the patient appropriate instructions on their disease process and when to call back. Follow-up Disposition: Not on [facility] [place], [place]    PLEASE NOTE:  This document has been produced using voice recognition software. Unrecognized errors in transcription may be present.

## 2017-06-27 NOTE — ANESTHESIA POSTPROCEDURE EVALUATION
Post-Anesthesia Evaluation & Assessment    Visit Vitals    /57    Pulse (!) 54    Temp 36.8 °C (98.3 °F)    Resp 17    Ht 5' 10\" (1.778 m)    Wt 74.8 kg (165 lb)    SpO2 100%    BMI 23.68 kg/m2       Post-operative hydration adequate. Pain score (VAS): 0 Pain Scale 1: Numeric (0 - 10) (06/27/17 1700)  Pain Intensity 1: 6 (06/27/17 1700)   Managed. Mental status & Level of consciousness: returned to baseline    Neurological status: returned to baseline    Pulmonary status: airway patent, oxygen given as needed. Complications related to anesthesia: none    Patient has met all discharge requirements.     Additional comments:        Joshua Singletary MD  June 27, 2017

## 2017-06-27 NOTE — IP AVS SNAPSHOT
[address] [address] 
[phone] Patient: Jase Garcia MRN: [id number] POH:1/90/1041 You are allergic to the following No active allergies Recent Documentation Height Weight BMI Smoking Status 1.778 m 71.2 kg 22.51 kg/m2 Former Smoker Emergency Contacts Name Discharge Info Relation Home Work Mobile Lynnette Caro DISCHARGE CAREGIVER [3] Daughter [21] [phone] E,Lynnette  Child [2] [phone] About your hospitalization You were admitted on:  June 27, 2017 You last received care in the:  [facility] 2 CV STEPDOWN You were discharged on:  July 12, 2017 Unit phone number:  [phone] Why you were hospitalized Your primary diagnosis was:  Not on File Your diagnoses also included: Aortoiliac Occlusive Disease (Hcc), Pad (Peripheral Artery Disease) (Hcc), Pseudoaneurysm Of Femoral Artery (Hcc) Providers Seen During Your Hospitalizations Provider Role Specialty Primary office phone Heidy Jara MD Attending Provider Vascular Surgery [phone] Your Primary Care Physician (PCP) Primary Care Physician Office Phone Office Fax Criselda Cluster [phone] Follow-up Information Follow up With Details Comments Contact Info Jaison Munoz [address] 
[address] [facility] [address] 
[phone] Silvio Parker MD On 7/17/2017 @ [facility] [address] [facility] [address] 
[phone] 800 [facility], [address] On 7/26/2017 @ 1:45 5818 [facility] Parker D [address] [facility] [address] [facility] [address] 
[phone] Chante Ray MD  [facility] . for  stated they will, call patient ,with them appt/time [address] 200 [facility] [address] 
[phone] Your Appointments  Monday July 17, 2017  8:30 AM EDT  
 ROUTINE CARE with Susana Suarez MD  
[address] [facility] ([address] [place] [address]) [facility] [address] 200 [facility] [address]  
[phone] Monday July 17, 2017 10:00 AM EDT TRANSITIONAL CARE MANAGEMENT with Susana Suarez MD  
[address] [facility] ([address] [place] [address]) [facility] [address] 200 [facility] [address]  
[phone] Wednesday July 26, 2017  1:45 PM EDT HOSPITAL DISCHARGE with [address] [place] [address] [facility] ([address] [place] [address]) [address] [facility] [address] 200 [facility] [address]  
[phone] Current Discharge Medication List  
  
CONTINUE these medications which have CHANGED Dose & Instructions Dispensing Information Comments Morning Noon Evening Bedtime  
 oxyCODONE-acetaminophen 5-325 mg per tablet Commonly known as:  PERCOCET What changed:   
- how much to take - when to take this 
- reasons to take this Your last dose was: Your next dose is:    
   
   
 Dose:  2 Tab Take 2 Tabs by mouth every four (4) hours as needed. Max Daily Amount: 12 Tabs. Quantity:  60 Tab Refills:  0  
     
   
   
   
  
 warfarin 2.5 mg tablet Commonly known as:  COUMADIN What changed:   
- how much to take 
- how to take this 
- additional instructions Your last dose was: Your next dose is:    
   
   
 2.5 mg po daily except on Tuesday Take 3.75 mg and then as per [facility] Quantity:  30 Tab Refills:  6 CONTINUE these medications which have NOT CHANGED Dose & Instructions Dispensing Information Comments Morning Noon Evening Bedtime  
 amiodarone 200 mg tablet Commonly known as:  CORDARONE Your last dose was: Your next dose is:    
   
   
 Dose:  200 mg Take 1 Tab by mouth two (2) times a day.  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  
     
   
   
   
  
 ascorbic acid (vitamin C) 250 mg tablet Commonly known as:  VITAMIN C Your last dose was: Your next dose is:    
   
   
 Dose:  250 mg Take 1 Tab by mouth daily (with breakfast). Quantity:  90 Tab Refills:  2  
     
   
   
   
  
 aspirin 81 mg chewable tablet Your last dose was: Your next dose is:    
   
   
 Dose:  81 mg Take 1 Tab by mouth daily (with breakfast). Quantity:  90 Tab Refills:  3  
     
   
   
   
  
 atorvastatin 40 mg tablet Commonly known as:  LIPITOR Your last dose was: Your next dose is:    
   
   
 Dose:  40 mg Take 1 Tab by mouth nightly. Indications: DYSLIPIDEMIA Quantity:  90 Tab Refills:  3 cholecalciferol 1,000 unit tablet Commonly known as:  VITAMIN D3 Your last dose was: Your next dose is:    
   
   
 Dose:  1000 Units Take 1 Tab by mouth daily. Quantity:  90 Tab Refills:  3  
     
   
   
   
  
 cyanocobalamin 1,000 mcg tablet Commonly known as:  VITAMIN B-12 Your last dose was: Your next dose is:    
   
   
 Dose:  1000 mcg Take 1 Tab by mouth daily. Quantity:  90 Tab Refills:  3  
     
   
   
   
  
 dilTIAZem 30 mg tablet Commonly known as:  CARDIZEM Your last dose was: Your next dose is:    
   
   
 Dose:  30 mg Take 1 Tab by mouth Before breakfast, lunch, dinner and at bedtime. Indications: hypertension Quantity:  120 Tab Refills:  6 DULoxetine 60 mg capsule Commonly known as:  CYMBALTA Your last dose was: Your next dose is:    
   
   
 Dose:  60 mg Take 1 Cap by mouth daily. Quantity:  90 Cap Refills:  2  
     
   
   
   
  
 ferrous sulfate 325 mg (65 mg iron) tablet Your last dose was: Your next dose is: Dose:  325 mg Take 1 Tab by mouth daily (with breakfast). Quantity:  90 Tab Refills:  3  
     
   
   
   
  
 gabapentin 100 mg capsule Commonly known as:  NEURONTIN Your last dose was: Your next dose is:    
   
   
 Dose:  100 mg Take 1 Cap by mouth two (2) times a day. Indications: NEUROPATHIC PAIN Quantity:  60 Cap Refills:  9  
     
   
   
   
  
 losartan 25 mg tablet Commonly known as:  COZAAR Your last dose was: Your next dose is:    
   
   
 Dose:  25 mg Take 1 Tab by mouth daily. Indications: Chronic Heart Failure, hypertension Quantity:  90 Tab Refills:  2  
     
   
   
   
  
 metoprolol tartrate 25 mg tablet Commonly known as:  LOPRESSOR Your last dose was: Your next dose is:    
   
   
 Dose:  12.5 mg Take 0.5 Tabs by mouth every twelve (12) hours. Indications: hypertension, VENTRICULAR RATE CONTROL IN ATRIAL FIBRILLATION Quantity:  30 Tab Refills:  6  
     
   
   
   
  
 zinc sulfate 220 (50) mg capsule Commonly known as:  ZINCATE Your last dose was: Your next dose is:    
   
   
 Dose:  220 mg Take 1 Cap by mouth daily. Quantity:  90 Cap Refills:  0 STOP taking these medications   
 clopidogrel 75 mg Tab Commonly known as:  PLAVIX  
   
  
 OTHER  
   
  
 trimethoprim-sulfamethoxazole  mg per tablet Commonly known as:  Devorah Mcdaniels Where to Get Your Medications Information on where to get these meds will be given to you by the nurse or doctor. ! Ask your nurse or doctor about these medications  
  oxyCODONE-acetaminophen 5-325 mg per tablet Discharge Instructions Learning About Aortobifemoral Bypass Surgery for Peripheral Arterial Disease What is an aortobifemoral bypass?  
 
An aortobifemoral (say \"dl-DI-daz-by-FEM-uh-nani\") bypass is surgery to redirect blood flow around blocked blood vessels in your belly or pelvis. These blocked blood vessels are caused by peripheral arterial disease. The surgery is done to get more blood flow to the legs. This may allow you to walk farther. Your doctor will use a man-made blood vessel, called a graft, to bypass the blocked blood vessels. The graft will carry blood from the aorta to the femoral artery in the groin area of each thigh. The aorta is the large blood vessel that carries blood from the heart to the blood vessels in the belly. The femoral arteries are large blood vessels that carry blood from the blood vessels in the belly and pelvis to the legs. How is the surgery done? You will be asleep during the surgery. The doctor will make cuts (incisions) in your belly and in the groin area of each thigh. The doctor will connect the graft to the aorta through the cut in the belly. He or she will then tunnel the graft down to the cuts in your groin. This connects the bypass to your femoral arteries. When the graft is in place, the doctor will close the cuts in your skin with stitches or staples. What can you expect after this surgery? You will probably spend 4 to 7 days in the hospital. Your belly and groin will be sore for several weeks. You will probably feel more tired than usual for several weeks. You may be able to do many of your usual activities after 4 to 6 weeks. But you will likely need 2 to 3 months to fully recover. You will probably need to take at least 4 to 6 weeks off from work. It depends on the type of work you do and how you feel. Follow-up care is a key part of your treatment and safety. Be sure to make and go to all appointments, and call your doctor if you are having problems. It's also a good idea to know your test results and keep a list of the medicines you take. Where can you learn more? Go to http://yuniel-lyndsay.info/. Enter 24 [id number] in the search box to learn more about \"Learning About Aortobifemoral Bypass Surgery for Peripheral Arterial Disease. \" Current as of: March 20, 2017 Content Version: 11.3 © 3454-3333 [employer]. Care instructions adapted under license by [employer] (which disclaims liability or warranty for this information). If you have questions about a medical condition or this instruction, always ask your healthcare professional. [address] any warranty or liability for your use of this information. Learning About Aortobifemoral Bypass Surgery for Peripheral Arterial Disease What is an aortobifemoral bypass? An aortobifemoral (say \"xz-EO-loy-by-FEM-uh-nani\") bypass is surgery to redirect blood flow around blocked blood vessels in your belly or pelvis. These blocked blood vessels are caused by peripheral arterial disease. The surgery is done to get more blood flow to the legs. This may allow you to walk farther. Your doctor will use a man-made blood vessel, called a graft, to bypass the blocked blood vessels. The graft will carry blood from the aorta to the femoral artery in the groin area of each thigh. The aorta is the large blood vessel that carries blood from the heart to the blood vessels in the belly. The femoral arteries are large blood vessels that carry blood from the blood vessels in the belly and pelvis to the legs. How is the surgery done? You will be asleep during the surgery. The doctor will make cuts (incisions) in your belly and in the groin area of each thigh. The doctor will connect the graft to the aorta through the cut in the belly. He or she will then tunnel the graft down to the cuts in your groin. This connects the bypass to your femoral arteries. When the graft is in place, the doctor will close the cuts in your skin with stitches or staples. What can you expect after this surgery? You will probably spend 4 to 7 days in the hospital. Your belly and groin will be sore for several weeks. You will probably feel more tired than usual for several weeks. You may be able to do many of your usual activities after 4 to 6 weeks. But you will likely need 2 to 3 months to fully recover. You will probably need to take at least 4 to 6 weeks off from work. It depends on the type of work you do and how you feel. Follow-up care is a key part of your treatment and safety. Be sure to make and go to all appointments, and call your doctor if you are having problems. It's also a good idea to know your test results and keep a list of the medicines you take. Where can you learn more? Go to http://yuniel-lyndsay.info/. Enter 24 [id number] in the search box to learn more about \"Learning About Aortobifemoral Bypass Surgery for Peripheral Arterial Disease. \" Current as of: March 20, 2017 Content Version: 11.3 © 1186-6077 [employer]. Care instructions adapted under license by [employer] (which disclaims liability or warranty for this information). If you have questions about a medical condition or this instruction, always ask your healthcare professional. [address] [address] any warranty or liability for your use of this information. Aortobifemoral Bypass: What to Expect at [facility] Your Recovery An aortobifemoral bypass is surgery to redirect blood around narrowed or blocked blood vessels in your belly or groin. The surgery is done to increase blood flow to the legs. This may allow you to walk farther. The doctor used a man-made blood vessel, called a graft, to bypass the narrowed or blocked blood vessels. The graft will carry blood from the aorta to the femoral artery in each thigh. The aorta is the large blood vessel that carries blood from the heart to the blood vessels in the belly.  The femoral arteries are large blood vessels that carry blood from the blood vessels in the belly to the legs. You can expect your belly and groin to be sore for several weeks. You will probably feel more tired than usual for several weeks after surgery. You may be able to do many of your usual activities after 4 to 6 weeks. But you will probably need 2 to 3 months to fully recover, especially if you usually do a lot of physical activities. This care sheet gives you a general idea about how long it will take for you to recover. But each person recovers at a different pace. Follow the steps below to feel better as quickly as possible. How can you care for yourself at home? Activity · Rest when you feel tired. Getting enough sleep will help you recover. · Try to walk each day. Start by walking a little more than you did the day before. Bit by bit, increase the amount you walk. Walking boosts blood flow and helps prevent pneumonia and constipation. · Avoid strenuous activities, such as bicycle riding, jogging, weight lifting, or aerobic exercise, for 6 weeks or until your doctor says it is okay. · For 6 weeks, avoid lifting anything that would make you strain. This may include a child, heavy grocery bags and milk containers, a heavy briefcase or backpack, cat litter or dog food bags, or a vacuum . · Hold a pillow over your belly incision when you cough or take deep breaths. This will support your belly and decrease your pain. · Do breathing exercises at home as instructed by your doctor. This will help prevent pneumonia. · Ask your doctor when you can drive again. · You will probably need to take at least 4 to 6 weeks off from work. It depends on the type of work you do and how you feel. · You may shower as usual. Pat the incisions dry. Do not take a bath for the first 2 weeks, or until your doctor tells you it is okay. · Ask your doctor when it is okay for you to have sex. Diet · You can eat your normal diet.  If your stomach is upset, try bland, low-fat foods like plain rice, broiled chicken, toast, and yogurt. · Drink plenty of fluids (unless your doctor tells you not to). · You may notice that your bowel movements are not regular right after your surgery. This is common. Try to avoid constipation and straining with bowel movements. You may want to take a fiber supplement every day. If you have not had a bowel movement after a couple of days, ask your doctor about taking a mild laxative. Medicines · Your doctor will tell you if and when you can restart your medicines. He or she will also give you instructions about taking any new medicines. · If you take blood thinners, such as warfarin (Coumadin), clopidogrel (Plavix), or aspirin, be sure to talk to your doctor. He or she will tell you if and when to start taking those medicines again. Make sure that you understand exactly what your doctor wants you to do. · Be safe with medicines. Take your medicines exactly as prescribed. Call your doctor if you think you are having a problem with your medicine. · Take pain medicines exactly as directed. ¨ If the doctor gave you a prescription medicine for pain, take it as prescribed. ¨ If you are not taking a prescription pain medicine, ask your doctor if you can take an over-the-counter medicine. · If you think your pain medicine is making you sick to your stomach: 
¨ Take your medicine after meals (unless your doctor has told you not to). ¨ Ask your doctor for a different pain medicine. · If your doctor prescribed antibiotics, take them as directed. Do not stop taking them just because you feel better. You need to take the full course of antibiotics. · Your doctor may prescribe a blood thinner when you go home. This helps prevent blood clots. Be sure you get instructions about how to take your medicine safely. Blood thinners can cause serious bleeding problems. Incision care · If you have strips of tape on the incisions, leave the tape on for a week or until it falls off. · Wash the area daily with warm, soapy water, and pat it dry. Don't use hydrogen peroxide or alcohol, which can slow healing. You may cover the area with a gauze bandage if it weeps or rubs against clothing. Change the bandage every day. · Keep the area clean and dry. Follow-up care is a key part of your treatment and safety. Be sure to make and go to all appointments, and call your doctor if you are having problems. It's also a good idea to know your test results and keep a list of the medicines you take. When should you call for help? Call 911 anytime you think you may need emergency care. For example, call if: 
· You passed out (lost consciousness). · You have severe trouble breathing. · You have sudden chest pain and shortness of breath, or you cough up blood. · You have severe pain in your belly. · You have chest pain or pressure. This may occur with: ¨ Sweating. ¨ Shortness of breath. ¨ Nausea or vomiting. ¨ Pain that spreads from the chest to the neck, jaw, or one or both shoulders or arms. ¨ Dizziness or lightheadedness. ¨ A fast or uneven pulse. After calling 911, chew 1 adult-strength aspirin. Wait for an ambulance. Do not try to drive yourself. Call your doctor now or seek immediate medical care if: 
· You have severe pain in your leg, or it becomes cold, pale, blue, tingly, or numb. · You are sick to your stomach or cannot keep fluids down. · You have pain that does not get better after you take pain medicine. · You have a fever over 100°F. 
· You have loose stitches, or your incisions come open. · Bright red blood has soaked through the bandage over any of your incisions. · You have signs of infection, such as: 
¨ Increased pain, swelling, warmth, or redness. ¨ Red streaks leading from the incision. ¨ Pus draining from the incision. ¨ Swollen lymph nodes in your neck, armpits, or groin. ¨ A fever. · You have signs of a blood clot, such as: ¨ Pain in your calf, back of the knee, thigh, or groin. ¨ Redness and swelling in your leg or groin. Watch closely for any changes in your health, and be sure to contact your doctor if: 
· You do not have a bowel movement after taking a laxative. Where can you learn more? Go to http://yuniel-lyndsay.info/. Enter X894 in the search box to learn more about \"Aortobifemoral Bypass: What to Expect at Home. \" Current as of: March 20, 2017 Content Version: 11.3 © 3115-5647 [employer]. Care instructions adapted under license by [employer] (which disclaims liability or warranty for this information). If you have questions about a medical condition or this instruction, always ask your healthcare professional. [address] any warranty or liability for your use of this information. Discharge Instructions Attachments/References AORTOBIFEMORAL BYPASS: POST-OP (ENGLISH) ATRIAL FIBRILLATION (ENGLISH) PAD (PERIPHERAL ARTERIAL DISEASE): LEG (ENGLISH) BETA-BLOCKERS (ENGLISH) WOUND: VAC (VACUUM-ASSISTED CLOSURE) (ENGLISH) Discharge Orders None [facility] Announcement We are excited to announce that we are making your provider's discharge notes available to you in [email or web address]. You will see these notes when they are completed and signed by the physician that discharged you from your recent hospital stay. If you have any questions or concerns about any information you see in Orggerhart, please call the Health Information Department where you were seen or reach out to your Primary Care Provider for more information about your plan of care. General Information Please provide this summary of care documentation to your next provider. Patient Signature:  ____________________________________________________________  Date:  ____________________________________________________________  
  
Erik Tracy    
    
 Provider Signature:  ____________________________________________________________ Date:  ____________________________________________________________ More Information Aortobifemoral Bypass: What to Expect at [facility] Your Recovery An aortobifemoral bypass is surgery to redirect blood around narrowed or blocked blood vessels in your belly or groin. The surgery is done to increase blood flow to the legs. This may allow you to walk farther. The doctor used a man-made blood vessel, called a graft, to bypass the narrowed or blocked blood vessels. The graft will carry blood from the aorta to the femoral artery in each thigh. The aorta is the large blood vessel that carries blood from the heart to the blood vessels in the belly. The femoral arteries are large blood vessels that carry blood from the blood vessels in the belly to the legs. You can expect your belly and groin to be sore for several weeks. You will probably feel more tired than usual for several weeks after surgery. You may be able to do many of your usual activities after 4 to 6 weeks. But you will probably need 2 to 3 months to fully recover, especially if you usually do a lot of physical activities. This care sheet gives you a general idea about how long it will take for you to recover. But each person recovers at a different pace. Follow the steps below to feel better as quickly as possible. How can you care for yourself at home? Activity · Rest when you feel tired. Getting enough sleep will help you recover. · Try to walk each day. Start by walking a little more than you did the day before. Bit by bit, increase the amount you walk. Walking boosts blood flow and helps prevent pneumonia and constipation. · Avoid strenuous activities, such as bicycle riding, jogging, weight lifting, or aerobic exercise, for 6 weeks or until your doctor says it is okay. · For 6 weeks, avoid lifting anything that would make you strain.  This may include a child, heavy grocery bags and milk containers, a heavy briefcase or backpack, cat litter or dog food bags, or a vacuum . · Hold a pillow over your belly incision when you cough or take deep breaths. This will support your belly and decrease your pain. · Do breathing exercises at home as instructed by your doctor. This will help prevent pneumonia. · Ask your doctor when you can drive again. · You will probably need to take at least 4 to 6 weeks off from work. It depends on the type of work you do and how you feel. · You may shower as usual. Pat the incisions dry. Do not take a bath for the first 2 weeks, or until your doctor tells you it is okay. · Ask your doctor when it is okay for you to have sex. Diet · You can eat your normal diet. If your stomach is upset, try bland, low-fat foods like plain rice, broiled chicken, toast, and yogurt. · Drink plenty of fluids (unless your doctor tells you not to). · You may notice that your bowel movements are not regular right after your surgery. This is common. Try to avoid constipation and straining with bowel movements. You may want to take a fiber supplement every day. If you have not had a bowel movement after a couple of days, ask your doctor about taking a mild laxative. Medicines · Your doctor will tell you if and when you can restart your medicines. He or she will also give you instructions about taking any new medicines. · If you take blood thinners, such as warfarin (Coumadin), clopidogrel (Plavix), or aspirin, be sure to talk to your doctor. He or she will tell you if and when to start taking those medicines again. Make sure that you understand exactly what your doctor wants you to do. · Be safe with medicines. Take your medicines exactly as prescribed. Call your doctor if you think you are having a problem with your medicine. · Take pain medicines exactly as directed. ¨ If the doctor gave you a prescription medicine for pain, take it as prescribed. ¨ If you are not taking a prescription pain medicine, ask your doctor if you can take an over-the-counter medicine. · If you think your pain medicine is making you sick to your stomach: 
¨ Take your medicine after meals (unless your doctor has told you not to). ¨ Ask your doctor for a different pain medicine. · If your doctor prescribed antibiotics, take them as directed. Do not stop taking them just because you feel better. You need to take the full course of antibiotics. · Your doctor may prescribe a blood thinner when you go home. This helps prevent blood clots. Be sure you get instructions about how to take your medicine safely. Blood thinners can cause serious bleeding problems. Incision care · If you have strips of tape on the incisions, leave the tape on for a week or until it falls off. · Wash the area daily with warm, soapy water, and pat it dry. Don't use hydrogen peroxide or alcohol, which can slow healing. You may cover the area with a gauze bandage if it weeps or rubs against clothing. Change the bandage every day. · Keep the area clean and dry. Follow-up care is a key part of your treatment and safety. Be sure to make and go to all appointments, and call your doctor if you are having problems. It's also a good idea to know your test results and keep a list of the medicines you take. When should you call for help? Call 911 anytime you think you may need emergency care. For example, call if: 
· You passed out (lost consciousness). · You have severe trouble breathing. · You have sudden chest pain and shortness of breath, or you cough up blood. · You have severe pain in your belly. · You have chest pain or pressure. This may occur with: ¨ Sweating. ¨ Shortness of breath. ¨ Nausea or vomiting. ¨ Pain that spreads from the chest to the neck, jaw, or one or both shoulders or arms. ¨ Dizziness or lightheadedness. ¨ A fast or uneven pulse. After calling 911, chew 1 adult-strength aspirin. Wait for an ambulance. Do not try to drive yourself. Call your doctor now or seek immediate medical care if: 
· You have severe pain in your leg, or it becomes cold, pale, blue, tingly, or numb. · You are sick to your stomach or cannot keep fluids down. · You have pain that does not get better after you take pain medicine. · You have a fever over 100°F. 
· You have loose stitches, or your incisions come open. · Bright red blood has soaked through the bandage over any of your incisions. · You have signs of infection, such as: 
¨ Increased pain, swelling, warmth, or redness. ¨ Red streaks leading from the incision. ¨ Pus draining from the incision. ¨ Swollen lymph nodes in your neck, armpits, or groin. ¨ A fever. · You have signs of a blood clot, such as: 
¨ Pain in your calf, back of the knee, thigh, or groin. ¨ Redness and swelling in your leg or groin. Watch closely for any changes in your health, and be sure to contact your doctor if: 
· You do not have a bowel movement after taking a laxative. Where can you learn more? Go to http://yuniel-lyndsay.info/. Enter P764 in the search box to learn more about \"Aortobifemoral Bypass: What to Expect at Home. \" Current as of: March 20, 2017 Content Version: 11.3 © 4798-7877 [employer]. Care instructions adapted under license by [employer] (which disclaims liability or warranty for this information). If you have questions about a medical condition or this instruction, always ask your healthcare professional. [place] [address] any warranty or liability for your use of this information. Atrial Fibrillation: Care Instructions Your Care Instructions Atrial fibrillation is an irregular and often fast heartbeat.  Treating this condition is important for several reasons. It can cause blood clots, which can travel from your heart to your brain and cause a stroke. If you have a fast heartbeat, you may feel lightheaded, dizzy, and weak. An irregular heartbeat can also increase your risk for heart failure. Atrial fibrillation is often the result of another heart condition, such as high blood pressure or coronary artery disease. Making changes to improve your heart condition will help you stay healthy and active. Follow-up care is a key part of your treatment and safety. Be sure to make and go to all appointments, and call your doctor if you are having problems. It's also a good idea to know your test results and keep a list of the medicines you take. How can you care for yourself at home? Medicines · Take your medicines exactly as prescribed. Call your doctor if you think you are having a problem with your medicine. You will get more details on the specific medicines your doctor prescribes. · If your doctor has given you a blood thinner to prevent a stroke, be sure you get instructions about how to take your medicine safely. Blood thinners can cause serious bleeding problems. · Do not take any vitamins, over-the-counter drugs, or herbal products without talking to your doctor first. 
Lifestyle changes · Do not smoke. Smoking can increase your chance of a stroke and heart attack. If you need help quitting, talk to your doctor about stop-smoking programs and medicines. These can increase your chances of quitting for good. · Eat a heart-healthy diet. · Stay at a healthy weight. Lose weight if you need to. · Limit alcohol to 2 drinks a day for men and 1 drink a day for women. Too much alcohol can cause health problems. · Avoid colds and flu. Get a pneumococcal vaccine shot. If you have had one before, ask your doctor whether you need another dose. Get a flu shot every year.  If you must be around people with colds or flu, wash your hands often. Activity · If your doctor recommends it, get more exercise. Walking is a good choice. Bit by bit, increase the amount you walk every day. Try for at least 30 minutes on most days of the week. You also may want to swim, bike, or do other activities. Your doctor may suggest that you join a cardiac rehabilitation program so that you can have help increasing your physical activity safely. · Start light exercise if your doctor says it is okay. Even a small amount will help you get stronger, have more energy, and manage stress. Walking is an easy way to get exercise. Start out by walking a little more than you did in the hospital. Gradually increase the amount you walk. · When you exercise, watch for signs that your heart is working too hard. You are pushing too hard if you cannot talk while you are exercising. If you become short of breath or dizzy or have chest pain, sit down and rest immediately. · Check your pulse regularly. Place two fingers on the artery at the palm side of your wrist, in line with your thumb. If your heartbeat seems uneven or fast, talk to your doctor. When should you call for help? Call 911 anytime you think you may need emergency care. For example, call if: 
· You have symptoms of a heart attack. These may include: ¨ Chest pain or pressure, or a strange feeling in the chest. 
¨ Sweating. ¨ Shortness of breath. ¨ Nausea or vomiting. ¨ Pain, pressure, or a strange feeling in the back, neck, jaw, or upper belly or in one or both shoulders or arms. ¨ Lightheadedness or sudden weakness. ¨ A fast or irregular heartbeat. After you call 911, the  may tell you to chew 1 adult-strength or 2 to 4 low-dose aspirin. Wait for an ambulance. Do not try to drive yourself. · You have symptoms of a stroke. These may include: 
¨ Sudden numbness, tingling, weakness, or loss of movement in your face, arm, or leg, especially on only one side of your body. ¨ Sudden vision changes. ¨ Sudden trouble speaking. ¨ Sudden confusion or trouble understanding simple statements. ¨ Sudden problems with walking or balance. ¨ A sudden, severe headache that is different from past headaches. · You passed out (lost consciousness). Call your doctor now or seek immediate medical care if: 
· You have new or increased shortness of breath. · You feel dizzy or lightheaded, or you feel like you may faint. · Your heart rate becomes irregular. · You can feel your heart flutter in your chest or skip heartbeats. Tell your doctor if these symptoms are new or worse. Watch closely for changes in your health, and be sure to contact your doctor if you have any problems. Where can you learn more? Go to http://yuniel-lyndsay.info/. Enter U020 in the search box to learn more about \"Atrial Fibrillation: Care Instructions. \" Current as of: September 21, 2016 Content Version: 11.3 © 8283-9908 [employer]. Care instructions adapted under license by [employer] (which disclaims liability or warranty for this information). If you have questions about a medical condition or this instruction, always ask your healthcare professional. [place] [address] any warranty or liability for your use of this information. Peripheral Arterial Disease of the Leg: Care Instructions Your Care Instructions Peripheral arterial disease (PAD) occurs when the blood vessels (arteries) that supply blood to the legs, belly, pelvis, arms, or neck get too narrow. This reduces blood flow to that area. The legs are affected most often. Fatty buildup (plaque) in the arteries usually is the cause of PAD. This buildup is also called \"hardening\" of the arteries. Your risk of PAD increases if you smoke or have high cholesterol, high blood pressure, diabetes, or a family history of PAD. One of the main symptoms of PAD is intermittent claudication.  This is a tight, aching, or squeezing pain in the calf, foot, thigh, or buttock that occurs during exercise. The pain usually gets worse during exercise and goes away when you rest. But as PAD gets worse, you may feel pain even at rest. 
Medicines and lifestyle changes may help your symptoms and lower your risk of heart attack and stroke. In some cases, surgery or other treatment is needed. It is important that you follow up with your doctor. Follow-up care is a key part of your treatment and safety. Be sure to make and go to all appointments, and call your doctor if you are having problems. It's also a good idea to know your test results and keep a list of the medicines you take. How can you care for yourself at home? · Do not smoke. Smoking can make PAD worse. If you need help quitting, talk to your doctor about stop-smoking programs and medicines. These can increase your chances of quitting for good. · Take your medicines exactly as prescribed. Call your doctor if you think you are having a problem with your medicine. · If you take a blood thinner, such as aspirin, be sure to get instructions about how to take your medicine safely. Blood thinners can cause serious bleeding problems. · Ask your doctor if a cardiac rehab program is right for you. Cardiac rehab can help you make lifestyle changes. In cardiac rehab, a team of health professionals provides education and support to help you make new, healthy habits. · Eat heart-healthy foods such as fruits, vegetables, whole grains, fish, lean meats, and low-fat or nonfat dairy foods. Limit sodium, sugar, and alcohol. · If your doctor recommends it, get more exercise. Walking is a good choice. Bit by bit, increase the amount you walk every day. Try for at least 30 minutes on most days of the week. · Stay at a healthy weight. Lose weight if you need to. · Take good care of your feet. ¨ Treat cuts and scrapes on your legs right away.  Poor blood flow prevents (or slows) quick healing of even small cuts or scrapes. This is even more important if you have diabetes. ¨ Avoid shoes that are too tight or that rub your feet. Shoes should be comfortable and fit well. ¨ Avoid socks or stockings that are tight enough to leave elastic-band marks on your legs. Tight socks can make circulation problems worse. ¨ Keep your feet clean and moisturized to prevent drying and cracking. Place cotton or lamb's wool between your toes to prevent rubbing and to absorb moisture. ¨ If you have a sore on your leg or foot, keep it dry and cover it with a nonstick bandage until you see your doctor. When should you call for help? Call 911 anytime you think you may need emergency care. For example, call if: 
· You have symptoms of a heart attack. These may include: ¨ Chest pain or pressure, or a strange feeling in the chest. 
¨ Sweating. ¨ Shortness of breath. ¨ Nausea or vomiting. ¨ Pain, pressure, or a strange feeling in the back, neck, jaw, or upper belly or in one or both shoulders or arms. ¨ Lightheadedness or sudden weakness. ¨ A fast or irregular heartbeat. After you call 911, the  may tell you to chew 1 adult-strength or 2 to 4 low-dose aspirin. Wait for an ambulance. Do not try to drive yourself. · You have sudden, severe leg pain, and your leg is cool and pale. · You have symptoms of a stroke. These may include: 
¨ Sudden numbness, tingling, weakness, or loss of movement in your face, arm, or leg, especially on only one side of your body. ¨ Sudden vision changes. ¨ Sudden trouble speaking. ¨ Sudden confusion or trouble understanding simple statements. ¨ Sudden problems with walking or balance. ¨ A sudden, severe headache that is different from past headaches. Call your doctor now or seek immediate medical care if: 
· You have leg pain that does not go away even if you rest. 
· Your leg pain changes or gets worse.  For example, if you have more pain with normal activity or the same pain with decreased activity, you should call. · You have an open sore on your leg or foot that is infected. Signs of infection include: 
¨ Increased pain, swelling, warmth, or redness. ¨ Red streaks leading from the sore. ¨ Pus draining from the sore. ¨ A fever. Watch closely for changes in your health, and be sure to contact your doctor if you have any problems. Where can you learn more? Go to http://yuniel-lyndsay.info/. Enter [id number] [id number] in the search box to learn more about \"Peripheral Arterial Disease of the Leg: Care Instructions. \" Current as of: July 19, 2016 Content Version: 11.3 © 7076-5288 [employer]. Care instructions adapted under license by [employer] (which disclaims liability or warranty for this information). If you have questions about a medical condition or this instruction, always ask your healthcare professional. [place] [address] any warranty or liability for your use of this information. Beta-Blockers: Care Instructions Your Care Instructions Beta-blockers are used to lower blood pressure and relieve angina symptoms, such as chest pain or pressure. And they decrease the chance of a second heart attack in someone who has already had a heart attack. They also slow the heart rate and reduce strain on the heart muscle and blood vessels. Most people do not have any side effects from beta-blockers. In rare cases, they can make asthma worse or make you feel tired. In some people, heart rate or blood pressure can drop too low. You may feel lightheaded. This may happen if you stand up quickly. It usually gets better with time. Before you start to take this medicine, make sure your doctor knows if you have severe asthma, frequent asthma attacks, or a history of depression. Examples include: · Atenolol (Tenormin). · Labetalol (Trandate). · Metoprolol (Lopressor, Toprol). · Propranolol (Inderal). Follow-up care is a key part of your treatment and safety. Be sure to make and go to all appointments, and call your doctor if you are having problems. It's also a good idea to know your test results and keep a list of the medicines you take. How can you care for yourself at home? · Take your medicines exactly as prescribed. Be sure to take high blood pressure medicines every day. Since high blood pressure often has no symptoms, it is easy to forget to take the pills. Call your doctor if you think you are having a problem with your medicine. · Always tell your doctor if you think you are having a side effect from your medicine. Stopping suddenly may make chest pains worse or cause your blood pressure to go up. Or it can cause other symptoms. If side effects are a problem with one medicine, you can try a different one. · Check with your doctor before you use any over-the-counter medicines. Beta-blockers can interact with other medicines. Make sure your doctor knows all of the medicines, vitamins, herbal products, and supplements you take. · Some people feel tired when they take beta-blockers. If you exercise, you may tire more easily. If beta-blockers make it very hard for you to exercise, talk to your doctor. When should you call for help? Call your doctor now or seek immediate medical care if: 
· You have wheezing or trouble breathing. · You have swelling in your face, head, neck, or tongue. · You are dizzy or lightheaded, or you feel like you may faint. Watch closely for changes in your health, and be sure to contact your doctor if you have any problems. Where can you learn more? Go to http://yuniel-lyndsay.info/. Enter H011 in the search box to learn more about \"Beta-Blockers: Care Instructions. \" Current as of: October 19, 2016 Content Version: 11.3 © 4075-3106 [employer].  Care instructions adapted under license by [address] (which disclaims liability or warranty for this information). If you have questions about a medical condition or this instruction, always ask your healthcare professional. [address] any warranty or liability for your use of this information. Vacuum-Assisted Closure for Wound Healing: Care Instructions Your Care Instructions When you have a wound that is hard to close your doctor may treat it with vacuum-assisted closure (VAC). VAC uses negative pressure (suction) to help bring the edges of your wound together. It also removes fluid and dead tissue from the wound area. In VAC: 
· A special piece of foam or cotton gauze fits over your wound. This covers and protects the wound. A clear bandage (film dressing) goes several inches beyond the foam or gauze dressing to create a seal for the vacuum. · A tube connects the foam to a small machine called the therapy unit. The therapy unit creates the suction. · The [facility] system may be carried around (portable) or may stay in one place (stationary). VAC does not hurt. You may feel a mild pulling on the wound when treatment first starts. How long you need VAC depends on the size and type of wound you have and how well the [facility] works. You will be limited in what you can do while the wound heals. You will use VAC 24 hours a day. Follow-up care is a key part of your treatment and safety. Be sure to make and go to all appointments, and call your doctor if you are having problems. It's also a good idea to know your test results and keep a list of the medicines you take. How can you care for yourself at home? · A home health care worker will come to your home a few times a week to change the bandage and check the machine. You may need it changed more often if there is a lot of drainage. · Your doctor will give you information on what you can and can't do.  This depends on where your wound is located. Your activities may be limited during the time you're using VAC. · You will be able to take sponge baths. Don't shower or take baths unless your doctor says it is okay. · Take pain medicines exactly as directed. ¨ If the doctor gave you a prescription medicine for pain, take it as prescribed. ¨ If you are not taking a prescription pain medicine, ask your doctor if you can take an over-the-counter medicine. · If your doctor prescribed antibiotics, take them as directed. Do not stop taking them just because you feel better. You need to take the full course of antibiotics. When should you call for help? Call 911 anytime you think you may need emergency care. For example, call if: 
· You have a lot of bleeding or see a sudden change in the color or texture of the drainage. · The wound splits open and organs under the skin can be seen (evisceration). Call your doctor now or seek immediate medical care if: · The wound starts bleeding. · The bandage comes off. Cover the area with a sterile bandage until you can see your doctor or your home health care worker comes by. · You have signs of infection, such as: 
¨ Increased pain, swelling, warmth, or redness around the wound. ¨ Red streaks leading from the wound. ¨ Pus draining from the wound. ¨ A fever. Watch closely for changes in your health, and be sure to contact your doctor if: · The noise the machine makes changes or gets very loud. This may mean the seal is broken or the machine is not producing enough suction. Where can you learn more? Go to http://yuniel-lyndsay.info/. Enter F535 in the search box to learn more about \"Vacuum-Assisted Closure for Wound Healing: Care Instructions. \" Current as of: March 20, 2017 Content Version: 11.3 © 3535-3390 [employer].  Care instructions adapted under license by [employer] (which disclaims liability or warranty for this information). If you have questions about a medical condition or this instruction, always ask your healthcare professional. [place] [address] any warranty or liability for your use of this information.

## 2017-06-27 NOTE — INTERVAL H&P NOTE
H&P Update:  Meliton Fox was seen and examined. History and physical has been reviewed.  Significant clinical changes have occurred as noted:  New abscess overlying right sided ax-bifem bypass needs open I+D    Signed By: Amanda Jeffers MD     June 27, 2017 2:20 PM

## 2017-06-27 NOTE — BRIEF OP NOTE
BRIEF OPERATIVE NOTE    Date of Procedure: 6/27/2017   Preoperative Diagnosis: R19.01 RIGHT QUADRANT MASS ON BYPASS  Postoperative Diagnosis: R19.01 RIGHT QUADRANT MASS  And left groin pseudoaneurysm. Procedure(s):  INCISION AND DRAINAGE RIGHT QUADRANT MASS Repair OF Pseudoaneurysm left GROIN    Surgeon(s) and Role:     * Amrita Stone MD - Primary         Assistant Staff:       Surgical Staff:  Circ-1: Blanka Villagran RN  Circ-Relief: Soif Gomez RN  Scrub Tech-1: Waddell Leyden. Knapp  Surg Asst-1: Shawnee Crawford  Event Time In   Incision Start 1510   Incision Close      Anesthesia: General   Estimated Blood Loss: 700mL  Specimens: * No specimens in log *   Findings: left groin pseudoaneurysm   Complications: a lot of bleeding before we could close down pseudoaneurysm of graft to arterial and surrounding tissues. Heavy scar in area.   Implants: * No implants in log *

## 2017-06-27 NOTE — ANESTHESIA PREPROCEDURE EVALUATION
Anesthetic History   No history of anesthetic complications       Comments:   Risk Factors for Postoperative nausea/vomiting:       History of postoperative nausea/vomiting? NO       Female? NO       Motion sickness? NO       Intended opioid administration for postoperative analgesia? YES      Smoking Abstinence  Current Smoker? no  Elective Surgery? YES  Seen preoperatively by anesthesiologist or proxy prior to day of surgery? YES  Pt abstained from smoking 24 hours prior to anesthesia? yes     Review of Systems / Medical History  Patient summary reviewed and pertinent labs reviewed    Pulmonary    COPD      Smoker         Neuro/Psych              Cardiovascular    Hypertension: poorly controlled      CHF: NYHA Classification II, dyspnea on exertion  Dysrhythmias : atrial fibrillation  CAD, PAD and hyperlipidemia    Exercise tolerance: <4 METS  Comments: Left ventricle: Systolic function was mildly to moderately reduced by visual assessment. Ejection fraction was estimated to be 40 %. There was moderate diffuse hypokinesis. Right ventricle: Systolic function was mildly reduced. Left atrium: The atrium was severely dilated. Left atrial appendage: No thrombus was identified.     Right atrium: The atrium was severely dilated   GI/Hepatic/Renal  Within defined limits              Endo/Other      Hypothyroidism: poorly controlled  Arthritis and anemia     Other Findings            Physical Exam    Airway  Mallampati: II  TM Distance: > 6 cm  Neck ROM: normal range of motion   Mouth opening: Normal     Cardiovascular    Rhythm: irregular  Rate: abnormal    Murmur: Grade 3, Mitral area     Dental    Dentition: Edentulous, Full lower dentures and Full upper dentures     Pulmonary  Breath sounds clear to auscultation               Abdominal  GI exam deferred       Other Findings            Anesthetic Plan    ASA: 4  Anesthesia type: MAC          Induction: Intravenous  Anesthetic plan and risks discussed with: Patient

## 2017-06-28 LAB
ANION GAP BLD CALC-SCNC: 8 MMOL/L (ref 3–18)
BUN SERPL-MCNC: 7 MG/DL (ref 7–18)
BUN/CREAT SERPL: 11 (ref 12–20)
CALCIUM SERPL-MCNC: 8.4 MG/DL (ref 8.5–10.1)
CHLORIDE SERPL-SCNC: 101 MMOL/L (ref 100–108)
CO2 SERPL-SCNC: 25 MMOL/L (ref 21–32)
CREAT SERPL-MCNC: 0.63 MG/DL (ref 0.6–1.3)
ERYTHROCYTE [DISTWIDTH] IN BLOOD BY AUTOMATED COUNT: 17 % (ref 11.6–14.5)
GLUCOSE SERPL-MCNC: 90 MG/DL (ref 74–99)
HCT VFR BLD AUTO: 20.7 % (ref 36–48)
HGB BLD-MCNC: 7 G/DL (ref 13–16)
MCH RBC QN AUTO: 27 PG (ref 24–34)
MCHC RBC AUTO-ENTMCNC: 33.8 G/DL (ref 31–37)
MCV RBC AUTO: 79.9 FL (ref 74–97)
PLATELET # BLD AUTO: 566 K/UL (ref 135–420)
PMV BLD AUTO: 8.6 FL (ref 9.2–11.8)
POTASSIUM SERPL-SCNC: 4 MMOL/L (ref 3.5–5.5)
RBC # BLD AUTO: 2.59 M/UL (ref 4.7–5.5)
SODIUM SERPL-SCNC: 134 MMOL/L (ref 136–145)
WBC # BLD AUTO: 18.4 K/UL (ref 4.6–13.2)

## 2017-06-28 PROCEDURE — 86900 BLOOD TYPING SEROLOGIC ABO: CPT | Performed by: PHYSICIAN ASSISTANT

## 2017-06-28 PROCEDURE — 36415 COLL VENOUS BLD VENIPUNCTURE: CPT | Performed by: SURGERY

## 2017-06-28 PROCEDURE — P9016 RBC LEUKOCYTES REDUCED: HCPCS | Performed by: PHYSICIAN ASSISTANT

## 2017-06-28 PROCEDURE — 87070 CULTURE OTHR SPECIMN AEROBIC: CPT | Performed by: PHYSICIAN ASSISTANT

## 2017-06-28 PROCEDURE — 74011250637 HC RX REV CODE- 250/637: Performed by: SURGERY

## 2017-06-28 PROCEDURE — 74011250636 HC RX REV CODE- 250/636: Performed by: PHYSICIAN ASSISTANT

## 2017-06-28 PROCEDURE — 77030019934 HC DRSG VAC ASST KCON -B

## 2017-06-28 PROCEDURE — 74011250636 HC RX REV CODE- 250/636: Performed by: SURGERY

## 2017-06-28 PROCEDURE — 80048 BASIC METABOLIC PNL TOTAL CA: CPT | Performed by: SURGERY

## 2017-06-28 PROCEDURE — 85027 COMPLETE CBC AUTOMATED: CPT | Performed by: SURGERY

## 2017-06-28 PROCEDURE — 77030011256 HC DRSG MEPILEX <16IN NO BORD MOLN -A

## 2017-06-28 PROCEDURE — 77030029211 HC GEL MEDIH TU INLC -B

## 2017-06-28 PROCEDURE — 86920 COMPATIBILITY TEST SPIN: CPT | Performed by: PHYSICIAN ASSISTANT

## 2017-06-28 PROCEDURE — 77030019952 HC CANSTR VAC ASST KCON -B

## 2017-06-28 PROCEDURE — 74011000258 HC RX REV CODE- 258: Performed by: SURGERY

## 2017-06-28 PROCEDURE — 65620000000 HC RM CCU GENERAL

## 2017-06-28 PROCEDURE — 36430 TRANSFUSION BLD/BLD COMPNT: CPT

## 2017-06-28 RX ORDER — SODIUM CHLORIDE 9 MG/ML
250 INJECTION, SOLUTION INTRAVENOUS AS NEEDED
Status: DISCONTINUED | OUTPATIENT
Start: 2017-06-28 | End: 2017-07-06

## 2017-06-28 RX ORDER — FUROSEMIDE 10 MG/ML
40 INJECTION INTRAMUSCULAR; INTRAVENOUS SEE ADMIN INSTRUCTIONS
Status: DISCONTINUED | OUTPATIENT
Start: 2017-06-28 | End: 2017-07-05 | Stop reason: SDUPTHER

## 2017-06-28 RX ORDER — HEPARIN SODIUM 200 [USP'U]/100ML
INJECTION, SOLUTION INTRAVENOUS
Status: DISPENSED
Start: 2017-06-28 | End: 2017-06-29

## 2017-06-28 RX ORDER — LIDOCAINE HYDROCHLORIDE 10 MG/ML
INJECTION, SOLUTION EPIDURAL; INFILTRATION; INTRACAUDAL; PERINEURAL
Status: DISPENSED
Start: 2017-06-28 | End: 2017-06-29

## 2017-06-28 RX ADMIN — DOCUSATE SODIUM 100 MG: 100 CAPSULE, LIQUID FILLED ORAL at 17:37

## 2017-06-28 RX ADMIN — Medication 250 MG: at 08:35

## 2017-06-28 RX ADMIN — CLOPIDOGREL 75 MG: 75 TABLET, FILM COATED ORAL at 17:37

## 2017-06-28 RX ADMIN — GABAPENTIN 100 MG: 100 CAPSULE ORAL at 17:37

## 2017-06-28 RX ADMIN — DILTIAZEM HYDROCHLORIDE 30 MG: 30 TABLET, FILM COATED ORAL at 08:53

## 2017-06-28 RX ADMIN — FUROSEMIDE 40 MG: 10 INJECTION, SOLUTION INTRAVENOUS at 22:50

## 2017-06-28 RX ADMIN — Medication 10 ML: at 20:39

## 2017-06-28 RX ADMIN — AMIODARONE HYDROCHLORIDE 200 MG: 200 TABLET ORAL at 08:36

## 2017-06-28 RX ADMIN — METOPROLOL TARTRATE 12.5 MG: 25 TABLET ORAL at 08:35

## 2017-06-28 RX ADMIN — ASPIRIN 81 MG CHEWABLE TABLET 81 MG: 81 TABLET CHEWABLE at 08:36

## 2017-06-28 RX ADMIN — DILTIAZEM HYDROCHLORIDE 30 MG: 30 TABLET, FILM COATED ORAL at 20:50

## 2017-06-28 RX ADMIN — DOCUSATE SODIUM 100 MG: 100 CAPSULE, LIQUID FILLED ORAL at 08:36

## 2017-06-28 RX ADMIN — OXYCODONE AND ACETAMINOPHEN 1 TABLET: 5; 325 TABLET ORAL at 20:32

## 2017-06-28 RX ADMIN — DULOXETINE HYDROCHLORIDE 60 MG: 60 CAPSULE, DELAYED RELEASE ORAL at 08:36

## 2017-06-28 RX ADMIN — Medication 220 MG: at 08:35

## 2017-06-28 RX ADMIN — FERROUS SULFATE TAB 325 MG (65 MG ELEMENTAL FE) 325 MG: 325 (65 FE) TAB at 08:35

## 2017-06-28 RX ADMIN — OXYCODONE AND ACETAMINOPHEN 1 TABLET: 5; 325 TABLET ORAL at 10:11

## 2017-06-28 RX ADMIN — ATORVASTATIN CALCIUM 40 MG: 40 TABLET, FILM COATED ORAL at 20:38

## 2017-06-28 RX ADMIN — CYANOCOBALAMIN TAB 1000 MCG 1000 MCG: 1000 TAB at 08:35

## 2017-06-28 RX ADMIN — HEPARIN SODIUM 5000 UNITS: 5000 INJECTION, SOLUTION INTRAVENOUS; SUBCUTANEOUS at 20:38

## 2017-06-28 RX ADMIN — LOSARTAN POTASSIUM 25 MG: 25 TABLET, FILM COATED ORAL at 08:36

## 2017-06-28 RX ADMIN — OXYCODONE AND ACETAMINOPHEN 1 TABLET: 5; 325 TABLET ORAL at 14:07

## 2017-06-28 RX ADMIN — Medication 10 ML: at 05:21

## 2017-06-28 RX ADMIN — HEPARIN SODIUM 5000 UNITS: 5000 INJECTION, SOLUTION INTRAVENOUS; SUBCUTANEOUS at 14:08

## 2017-06-28 RX ADMIN — GABAPENTIN 100 MG: 100 CAPSULE ORAL at 08:36

## 2017-06-28 RX ADMIN — FUROSEMIDE 40 MG: 10 INJECTION, SOLUTION INTRAVENOUS at 17:40

## 2017-06-28 RX ADMIN — HEPARIN SODIUM 5000 UNITS: 5000 INJECTION, SOLUTION INTRAVENOUS; SUBCUTANEOUS at 05:27

## 2017-06-28 RX ADMIN — SODIUM CHLORIDE 1 G: 900 INJECTION, SOLUTION INTRAVENOUS at 08:34

## 2017-06-28 RX ADMIN — Medication 10 ML: at 14:08

## 2017-06-28 RX ADMIN — VITAMIN D, TAB 1000IU (100/BT) 1000 UNITS: 25 TAB at 08:35

## 2017-06-28 NOTE — WOUND CARE
Physical Exam   Musculoskeletal:        Legs:        Wound Leg Right; Lower (Active)   DRESSING STATUS Clean, dry, and intact; Removed 6/28/2017 11:48 AM   DRESSING TYPE Elastic bandage;Gauze wrap (harriet);ABD pad 6/28/2017 11:48 AM   Non-Pressure Injury Partial thickness (epider/derm) 6/28/2017 11:48 AM   Wound Length (cm) 0.6 cm 6/28/2017 11:48 AM   Wound Width (cm) 0.2 cm 6/28/2017 11:48 AM   Wound Depth (cm) 0.1 6/28/2017 11:48 AM   Wound Surface area (cm^3) 0.01 cm^2 6/28/2017 11:48 AM   Condition of Base Other (comment) 6/28/2017 11:48 AM   Condition of Edges Closed 6/28/2017 11:48 AM   Tissue Type Percent Yellow 100 6/28/2017 11:48 AM   Drainage Amount  Scant 6/28/2017 11:48 AM   Drainage Color Yellow 6/28/2017 11:48 AM   Wound Odor None 6/28/2017 11:48 AM   Periwound Skin Condition Intact 6/28/2017 11:48 AM   Dressing Type Applied Honey;Silicone 3/96/6990 97:43 AM   Procedure Tolerated Well 6/28/2017 11:48 AM   Number of days:0       Wound Ankle Right;Lateral (Active)   DRESSING STATUS Clean, dry, and intact; Removed 6/28/2017 11:48 AM   DRESSING TYPE Elastic bandage;Gauze wrap (harriet);ABD pad 6/28/2017 11:48 AM   Non-Pressure Injury Partial thickness (epider/derm) 6/28/2017 11:48 AM   Wound Length (cm) 0.7 cm 6/28/2017 11:48 AM   Wound Width (cm) 1 cm 6/28/2017 11:48 AM   Wound Depth (cm) 0.1 6/28/2017 11:48 AM   Wound Surface area (cm^3) 0.07 cm^2 6/28/2017 11:48 AM   Condition of Base Other (comment) 6/28/2017 11:48 AM   Condition of Edges Closed 6/28/2017 11:48 AM   Tissue Type Yellow 6/28/2017 11:48 AM   Tissue Type Percent Yellow 100 6/28/2017 11:48 AM   Drainage Amount  Scant 6/28/2017 11:48 AM   Drainage Color Yellow 6/28/2017 11:48 AM   Wound Odor None 6/28/2017 11:48 AM   Periwound Skin Condition Intact 6/28/2017 11:48 AM   Dressing Type Applied Honey;Silicone 1/02/7460 69:33 AM   Procedure Tolerated Well 6/28/2017 11:48 AM   Number of days:0       Wound Heel Right;Lateral (Active)   DRESSING STATUS Clean, dry, and intact; Removed 6/28/2017 11:48 AM   DRESSING TYPE Elastic bandage;Gauze wrap (harriet);ABD pad 6/28/2017 11:48 AM   Non-Pressure Injury Partial thickness (epider/derm) 6/28/2017 11:48 AM   Wound Length (cm) 0.5 cm 6/28/2017 11:48 AM   Wound Width (cm) 1 cm 6/28/2017 11:48 AM   Wound Depth (cm) 0.1 6/28/2017 11:48 AM   Wound Surface area (cm^3) 0.05 cm^2 6/28/2017 11:48 AM   Condition of Base Granulation 6/28/2017 11:48 AM   Condition of Edges Closed;Calloused 6/28/2017 11:48 AM   Tissue Type Red 6/28/2017 11:48 AM   Tissue Type Percent Red 100 6/28/2017 11:48 AM   Drainage Amount  Small  6/28/2017 11:48 AM   Drainage Color Sanguinous 6/28/2017 11:48 AM   Wound Odor None 6/28/2017 11:48 AM   Periwound Skin Condition Calloused; Intact 6/28/2017 11:48 AM   Dressing Type Applied Honey;Silicone 9/04/1937 86:88 AM   Procedure Tolerated Well 6/28/2017 11:48 AM   Number of days:0      5422: Seen by wound care per consult, wound dressing changes performed at this time. Three vascular ulcerations noted with improvement since last seen by wound care at this facility. Treatment plan explained  and Pt agreeable to continue current treatment. Wound care education provided to pt at this time. Plan of care reviewed with nursing. Will turn over care to nursing staff at this time  Maverick Talavera, Wound Care Department

## 2017-06-28 NOTE — PROGRESS NOTES
Bedside shift change report received from Dori Ye (offgoing nurse). Report included the following information SBAR, Procedure Summary, MAR and Cardiac Rhythm NSR. Wound Vac being changed by Baron Nu YANEZ during shift report for 4th time secondary to clotting. 0800: Patient up to chair with minimal assistance for breakfast. Will be transfusing 4 units PRBCs today. Need consent and type and screen. 0900: Type and screen sent to lab, Patient consents to blood transfusion. 1240: 1st unit of PRBC's started, No s/s of transfusion reaction. Daughter here to see patient as well as sister. Family dynamics with sister needs to be monitored. 1445: 1st unit PRBC complete. 1535: 2nd unit PRBCs started, no s/s of transfusion reaction. 1740: 2nd unit complete. Lasix given. No s/s of transfusion reaction. Sent for 3rd unit    1800: Wound vac clotted off. 3rd unit of PRBC's started verified by 2 nurses. Dressing changed to wound vac with good suction. 1920: Bedside shift change report given to [employer] (oncoming nurse). Report included the following information Kardex, Procedure Summary, MAR, Recent Results and Cardiac Rhythm Sinus Tennis Constant.

## 2017-06-28 NOTE — PROGRESS NOTES
SEVERIANO submitted order for home wound vac via [employer]. Patient is eligible for a \"Ready Pack Kit\" and will be able to receive his vac prior to discharging the hospital.  Will need to fax documentation (including prescription) to [employer] to complete order prior to delivery. SEVERIANO requested assistance with processing order from [facility].

## 2017-06-28 NOTE — OP NOTES
[address]    Name:  Ayden Colvin  MR#:  [id number]  :  1960  Account #:  [de-identified]  Date of Adm:  2017  Date of Surgery:  2017      ATTENDING SURGEON:  James Pardo MD    PREOPERATIVE DIAGNOSIS:  Aortoiliac occlusive disease with  peripheral arterial disease. The patient does have a right abdominal  mass with purulence, likely abscess, and has a left groin mass under  tension. POSTOPERATIVE DIAGNOSES  1. Right side abscess. 2.  Left common femoral artery pseudoaneurysm. PROCEDURES PERFORMED:    CULTURES:  None. ESTIMATED BLOOD LOSS:  700 mL. SPECIMENS REMOVED:  None. DRAINS:  None. ANESTHESIA:  General.    INDICATIONS FOR THE PROCEDURE:  The patient is a 78-year-old  gentleman who has a right axillary to right-to-left femoral-femoral  bypass. The patient had a purulent mass that was overlying his side  directly over top his axillary bypass with some drainage and  purulence. The patient was recommended an incision and drainage. He also ended up on arrival showed me his left common femoral area  and groin showing a tight mass with purple discoloration of his skin. Discussed with the patient that this needed to be also explored and he  agreed. The patient was given the risks and benefits of the procedure  including but not limited to bleeding, infection, damage to adjacent  structures, MI, stroke and death, as well as loss of lower extremity and  need for further surgery. He was understanding of all the risks and  underwent the procedure. OPERATIVE FINDINGS:  Subcutaneous abscess directly overlying the  axillary-femoral portion of the right side, unable to identify any exposed  graft in the area and does not appear to track down to the graft at this  current time. The left groin mass was explored. Large amounts of  hematoma was identified and a pseudoaneurysm was also identified.   We were able to get control of this eventually. DESCRIPTION OF PROCEDURE:  The patient was correctly identified  in the preoperative holding area and taken to the operating room in  stable condition. The patient had a pre-incision time-out prior to any  incision. The patient had preoperative antibiotics prior to any incision. We then were able to identify his right flank, found the wound and  made a small incision directly overlying the wound. Large amounts  of purulence was removed. We explored it generally and did  not identify any graft or any tracking, so we copiously irrigated it and  placed a dressing over top. We then moved over to the groin, changed  gloves on out outer gloves, made an incision overlying the groin and  identified large amounts of hematoma, none of which was under any  pressure, but we did remove a lot of hematoma in this area, but  continued to see more hematoma despite getting everything flat and  still had some minor, kind of, dark bloody ooze so we starting irrigating  the area and at this point large amounts of bright-red blood was  coming back. We then opened our incision fully and identified the  graft, clamped the fem-fem graft, and attempted to dissect down to the  artery but the patient had what felt like almost a frozen groin in this  area. We were able to identify the whole top of the graft on top of the  artery. The medial aspect head and toe seemed to be fully  incorporated with the artery but the lateral portion more proximal on the  artery seemed to be flapping in the wind with a pseudoaneurysm in this  area, so using a sponge stick and multiple CV5 sutures we were able  to close down the graft onto the artery, as well as the scar tissue, so  that he was not bleeding anymore. We opened up the graft, placed  Surgicel and copiously irrigated. He had a generalized ooze to his  entire groin, but overall seemed quite dry considering.   We then closed  with a 2-0 Vicryl over top of this area and placed a wound VAC on top. The area measured 9.5 cm x 3 cm x 2 cm. We then decided to admit  the patient for observation in the intensive care unit. The patient  tolerated the procedure well and was taken to the ICU in stable but  critical condition.         MD ALFONSO Estrada Se / Jazzy Palmer  D:  06/27/2017   18:55  T:  06/28/2017   07:47  Job #:  [id number]

## 2017-06-28 NOTE — ROUTINE PROCESS
[id number] Informed Dr Colletta Pander of continue clotting of wound vac and change would vac twice this shift already, ? To replace wound vac or place dry dressing instead because of clotting, ordered to replace wound vac and will reassess later today   0730 Bedside and Verbal shift change report given to Marky (oncoming nurse) by Cathi Connelly RN   (offgoing nurse). Report included the following information SBAR, Kardex, OR Summary, Procedure Summary, Intake/Output, MAR and Alarm Parameters .

## 2017-06-28 NOTE — CONSULTS
Infectious Disease Consultation Note    Requested by:JUNE Dunn    Reason: graft site infection     Current abx Prior abx   Cefazolin       Lines:   Peripheral IV     Assessment :  1/   Right chest wall  abscess: Patient with history of  a right axillary to right-to-left femoral-femoral Bypass. Patient was admitted after he was noted to have a purulent mass that was overlying his side directly over top of  his axillary bypass with some drainage and purulence. Patient denied any systemic signs or symptoms of infection. He has undergone I & D of the mass which revealed jose luis pus in the subcutaneous tissue but not communicating  with the vascular graft. No intraoperative cultures were collected. Patient has received preoperative cefazolin and currently he is continued on cefazolin. He has normal WBC count and no fever since admission. No clinical suspicion of vascular graft infection. Superficial wound culture has been collected and result is pending. 2/   Left common femoral artery pseudoaneurysm: s/p repair and wound vac placement. Intraoperative finding showed large hematoma but no purulence. Recommendations:  1/ Continue cefazolin while he remains in hospital. When he is ready for discharge from vascular stand point, you may transition him to keflex 500 mg PO Q 6 HR for a total of 10 days for subcutaneus abscess. 2/ Follow up superficial wound cultures and adjust antimicrobial as needed. 3/ Management of post operative wound and wound vac per vascular and wound care  team recommendation. 4/ Patient is advised to come back to ED if he has any fever, chills or purulence from the wound site. Thank you for consultation request. Above plan was discussed in details with patient. Please call me if any further questions or concerns. We will not follow him actively. HPI:  Mr. Jessica Ford is  a 52-year-old  male with PMH of  a right axillary to right-to-left femoral-femoral  bypass. Patient was admitted after he was noted to have a purulent mass that was overlying his right chest side directly over top of  his axillary bypass with some drainage and purulence. Patient denied any systemic signs or symptoms of infection. He has undergone I & D of the mass which revealed jose luis pus in the subcutaneous tissue but not communicating  with the vascular graft. No intraoperative cultures were collected. Patient has received preoperative cefazolin and currently he is continued on cefazolin. He has normal WBC count and no fever since admission. Superficial wound culture has been collected and result is pending. He also has had right femoral pseudoaneurysm and he underwent repair and wound vac placement. Intraoperative finding was positive for large hemartoma but no purulence. Patient currently feels much better and he has no complaint. ID asked to evaluate for discharge antibiotics recommendation. Past Medical History:   Diagnosis Date    Acute blood loss as cause of postoperative anemia 4/4/2017    Anticoagulated on Coumadin     Aortoiliac occlusive disease (HCC) 1/25/2017    Atherosclerosis of native artery of both lower extremities with intermittent claudication ([address] 1/25/2017    Benign hypertensive heart disease with systolic congestive heart failure, NYHA class 2 ([address] 1/26/2015    Carotid artery disease (HCC)     Chronic atrial fibrillation (HCC)     Chronic systolic heart failure (HCC)     Chronic ulcer of right foot ([address] 4/4/2017    Coronary artery disease involving native coronary artery of native heart 3/15/2017    Successful stenting of Cx (Xience LESLEY) and RCA (Xience LESLEY) to 0% by Dr. Lizeth Pinto on 3/15/17.     DDD (degenerative disc disease), lumbar 1/26/2015    Dyslipidemia     Erectile dysfunction 7/5/2016    Euthyroid sick syndrome 4/6/2017    Hereditary peripheral neuropathy 11/15/2016    History of cardioversion 4/18/2017    S/P Synchronized external cardioversion (4/18/2017 - Dr. Александр Moreno)    Hypertension     Hyperuricemia     Ischemic cardiomyopathy     Peripheral artery disease ([facility] 1/25/2017    Spinal stenosis of lumbar region with radiculopathy 5/4/2015    Dr. Rose Lancaster Vitamin D deficiency 4/22/2017    Vitamin D 25-Hydroxy (4/22/2017) = 12.0       Past Surgical History:   Procedure Laterality Date    CARDIAC CATHETERIZATION  3/8/2017         CARDIAC CATHETERIZATION  3/15/2017         CORONARY STENT SINGLE W/PTCA  3/15/2017         HX COLONOSCOPY  07/23/2015    polyps repeat 2020 5 years [address] Right 04/04/2017    S/P Right axillary to bifemoral artery bypass using an 8 mm Propaten graft from the right axilla to the right common femoral artery with a jump femoral-femoral bypass with another 8 mm Propaten external ring bypass; Right common femoral artery, and profunda femoris artery and superficial femoral artery endarterectomy causing an extensive surgery on the right side (4/4/2017 - Dr. Diallo Sosa)    [address] 89 Right 04/07/2017    S/P Cutdown of femoral-femoral bypass, more on the left groin side; Right lower extremity angiography with first-order catheterization (4/7/2017 - Dr. Jovan Lama)    HX FEMORAL BYPASS Right 04/10/2017    S/P Right femoral to above-knee popliteal bypass with an 8 mm PTFE graft (4/10/2017 - Dr. Dari Min)       Current Discharge Medication List      CONTINUE these medications which have NOT CHANGED    Details   clopidogrel (PLAVIX) 75 mg tab Take 1 Tab by mouth daily (with dinner). Qty: 30 Tab, Refills: 3      gabapentin (NEURONTIN) 100 mg capsule Take 1 Cap by mouth two (2) times a day. Indications: NEUROPATHIC PAIN  Qty: 60 Cap, Refills: 9      trimethoprim-sulfamethoxazole (BACTRIM, SEPTRA)  mg per tablet Take 1 Tab by mouth two (2) times a day. Qty: 20 Tab, Refills: 0      aspirin 81 mg chewable tablet Take 1 Tab by mouth daily (with breakfast).   Qty: 90 Tab, Refills: 3      cholecalciferol (VITAMIN D3) 1,000 unit tablet Take 1 Tab by mouth daily. Qty: 90 Tab, Refills: 3      losartan (COZAAR) 25 mg tablet Take 1 Tab by mouth daily. Indications: Chronic Heart Failure, hypertension  Qty: 90 Tab, Refills: 2      ascorbic acid, vitamin C, (VITAMIN C) 250 mg tablet Take 1 Tab by mouth daily (with breakfast). Qty: 90 Tab, Refills: 2      DULoxetine (CYMBALTA) 60 mg capsule Take 1 Cap by mouth daily. Qty: 90 Cap, Refills: 2      zinc sulfate (ZINCATE) 220 (50) mg capsule Take 1 Cap by mouth daily. Qty: 90 Cap, Refills: 0      ferrous sulfate 325 mg (65 mg iron) tablet Take 1 Tab by mouth daily (with breakfast). Qty: 90 Tab, Refills: 3      atorvastatin (LIPITOR) 40 mg tablet Take 1 Tab by mouth nightly. Indications: DYSLIPIDEMIA  Qty: 90 Tab, Refills: 3      amiodarone (CORDARONE) 200 mg tablet Take 1 Tab by mouth two (2) times a day. Start taking in the morning of 5/3/17  Indications: VENTRICULAR RATE CONTROL IN ATRIAL FIBRILLATION  Qty: 60 Tab, Refills: 0      metoprolol tartrate (LOPRESSOR) 25 mg tablet Take 0.5 Tabs by mouth every twelve (12) hours. Indications: hypertension, VENTRICULAR RATE CONTROL IN ATRIAL FIBRILLATION  Qty: 30 Tab, Refills: 6      dilTIAZem (CARDIZEM) 30 mg tablet Take 1 Tab by mouth Before breakfast, lunch, dinner and at bedtime. Indications: hypertension  Qty: 120 Tab, Refills: 6      cyanocobalamin (VITAMIN B-12) 1,000 mcg tablet Take 1 Tab by mouth daily. Qty: 90 Tab, Refills: 3      oxyCODONE-acetaminophen (PERCOCET) 5-325 mg per tablet Take 1 Tab by mouth every six (6) hours as needed for Pain. Max Daily Amount: 4 Tabs. Qty: 40 Tab, Refills: 0      warfarin (COUMADIN) 2.5 mg tablet 2.5 mg po daily except on Tuesday Take 3.75 mg and then as per [facility]  Qty: 30 Tab, Refills: 6      !! OTHER Check PT, INR on 5/1/17- results to Cardiologist immediately.  Diagnosis- A Fib  Qty: 1 Each, Refills: 0      !! OTHER Check CBC, CMP, Mg on 5/1/17. Results to PCP immediately. Diagnosis- PAD  Qty: 1 Each, Refills: 0       !! - Potential duplicate medications found. Please discuss with provider.           Current Facility-Administered Medications   Medication Dose Route Frequency    0.9% sodium chloride infusion 250 mL  250 mL IntraVENous PRN    furosemide (LASIX) injection 40 mg  40 mg IntraVENous See Admin Instructions    sodium chloride (NS) flush 5-10 mL  5-10 mL IntraVENous PRN    fentaNYL citrate (PF) injection 50 mcg  50 mcg IntraVENous Multiple    amiodarone (CORDARONE) tablet 200 mg  200 mg Oral BID    ascorbic acid (vitamin C) (VITAMIN C) tablet 250 mg  250 mg Oral DAILY WITH BREAKFAST    aspirin chewable tablet 81 mg  81 mg Oral DAILY WITH BREAKFAST    atorvastatin (LIPITOR) tablet 40 mg  40 mg Oral QHS    cholecalciferol (VITAMIN D3) tablet 1,000 Units  1,000 Units Oral DAILY    clopidogrel (PLAVIX) tablet 75 mg  75 mg Oral DAILY WITH DINNER    cyanocobalamin tablet 1,000 mcg  1,000 mcg Oral DAILY    dilTIAZem (CARDIZEM) IR tablet 30 mg  30 mg Oral AC&HS    DULoxetine (CYMBALTA) capsule 60 mg  60 mg Oral DAILY    ferrous sulfate tablet 325 mg  325 mg Oral DAILY WITH BREAKFAST    gabapentin (NEURONTIN) capsule 100 mg  100 mg Oral BID    losartan (COZAAR) tablet 25 mg  25 mg Oral DAILY    metoprolol tartrate (LOPRESSOR) tablet 12.5 mg  12.5 mg Oral Q12H    zinc sulfate (ZINCATE) capsule 220 mg  220 mg Oral DAILY    sodium chloride (NS) flush 5-10 mL  5-10 mL IntraVENous Q8H    sodium chloride (NS) flush 5-10 mL  5-10 mL IntraVENous PRN    acetaminophen (TYLENOL) tablet 650 mg  650 mg Oral Q4H PRN    oxyCODONE-acetaminophen (PERCOCET) 5-325 mg per tablet 1 Tab  1 Tab Oral Q4H PRN    morphine injection 2 mg  2 mg IntraVENous Q2H PRN    naloxone (NARCAN) injection 0.4 mg  0.4 mg IntraVENous PRN    ondansetron (ZOFRAN) injection 4 mg  4 mg IntraVENous Q4H PRN    docusate sodium (COLACE) capsule 100 mg  100 mg Oral BID    heparin (porcine) injection 5,000 Units  5,000 Units SubCUTAneous Q8H       Allergies: Review of patient's allergies indicates no known allergies. Family History   Problem Relation Age of Onset    Heart Disease Father      Social History     Social History    Marital status: LEGALLY      Spouse name: N/A    Number of children: N/A    Years of education: N/A     Occupational History    Not on file. Social History Main Topics    Smoking status: Former Smoker    Smokeless tobacco: Never Used      Comment: quit in     Alcohol use 1.2 oz/week     2 Cans of beer per week      Comment: 10 cans of beer a month    Drug use: Yes     Special: Marijuana      Comment: occasionally    Sexual activity: Yes     Partners: Female     Other Topics Concern    Not on file     Social History Narrative     History   Smoking Status    Former Smoker   Smokeless Tobacco    Never Used     Comment: quit in         Temp (24hrs), Av.3 °F (36.8 °C), Min:98 °F (36.7 °C), Max:98.6 °F (37 °C)    Visit Vitals    /50    Pulse (!) 56    Temp 98.4 °F (36.9 °C)    Resp 18    Ht 5' 10\" (1.778 m)    Wt 75.9 kg (167 lb 4.8 oz)    SpO2 95%    BMI 24.01 kg/m2       ROS: 12 point ROS obtained in details. Pertinent positives as mentioned in HPI,   otherwise negative    Physical Exam:    General: Well developed, well nourished male laying on the bed/sitting on the  bed AAOx3 in no acute distress. General:   awake alert and oriented   HEENT:  Normocephalic, atraumatic, PERRL, EOMI, no scleral icterus or pallor; no conjunctival hemmohage;  nasal and oral mucous are moist and without evidence of lesions. No thrush. Neck supple, no bruits.    Lymph Nodes:   no cervical, axillary or inguinal adenopathy   Lungs:   non-labored, bilaterally clear to auscultation- no crackles wheezes rales or rhonchi   Heart:  RRR, s1 and s2; no murmurs rubs or gallops, no edema, + pedal pulses   Abdomen:  soft, non-distended, active bowel sounds, no hepatomegaly, no splenomegaly. Appropriate surgical scars for stated surgeries. Non-tender   Genitourinary:  deferred   Extremities:   no clubbing, cyanosis; no joint effusions or swelling; muscle mass appropriate for age. He has a wound vac on the right groin area. No swelling or erythema noted under the dressing . Neurologic:  No gross focal sensory abnormalities; 5/5 muscle strength to upper and lower extremities. Speech appropriate. Cranial nerves intact                        Skin:  No rash or ulcers noted   Wound:   He has small incision wound on the right chest wall, no purulence noted, no erythema or induration around the wound site.       Back:  no spinal or paraspinal muscle tenderness or rigidity, no CVA tenderness     Psychiatric:  No suicidal or homicidal ideations, appropriate mood and affect         Labs: Results:   Chemistry Recent Labs      06/28/17   0555  06/27/17   1712   GLU  90  82   NA  134*  134*   K  4.0  4.2   CL  101  103   CO2  25  25   BUN  7  7   CREA  0.63  0.68   CA  8.4*  8.1*   AGAP  8  6   BUCR  11*  10*      CBC w/Diff Recent Labs      06/28/17   0555  06/27/17   1712   WBC  18.4*  12.7   RBC  2.59*  2.49*   HGB  7.0*  6.6*   HCT  20.7*  19.5*   PLT  566*  515*      Microbiology Recent Labs      06/28/17   1000   CULT  PENDING          RADIOLOGY:    All available imaging studies/reports in [facility] for this admission were reviewed    Dr. Vita Gonzalez, Infectious Disease Specialist  [phone]  June 28, 2017  2:45 PM

## 2017-06-28 NOTE — PROGRESS NOTES
Admit Date: 2017    POD 1 Day Post-Op    Procedure:  Procedure(s):  INCISION AND DRAINAGE RIGHT QUADRANT MASS Repair OF Pseudoaneurysm LEFT GROIN      Subjective:     Patient has no complaints. Nursing reports issues with vac overnight    Objective:     Blood pressure 122/61, pulse 63, temperature 98.4 °F (36.9 °C), resp. rate 26, height 5' 10\" (1.778 m), weight 167 lb 4.8 oz (75.9 kg), SpO2 96 %.     Temp (24hrs), Av.2 °F (36.8 °C), Min:97.8 °F (36.6 °C), Max:98.6 °F (37 °C)      Physical Exam:   Pt is OOB to chair  Vac to left groin wound  Right trunk dressing c/d/i  Dressings to right foot c/d/i        Labs:   Recent Results (from the past 24 hour(s))   POC 6 PLUS    Collection Time: 17 11:40 AM   Result Value Ref Range    Sodium,  136 - 145 MMOL/L    Potassium, POC 5.1 3.5 - 5.5 MMOL/L    Chloride,  100 - 108 MMOL/L    BUN, POC 8 7 - 18 MG/DL    Glucose, POC 96 74 - 106 MG/DL    Hematocrit, POC 27 (L) 36 - 49 %    Hemoglobin, POC 9.2 (L) 12 - 16 G/DL   DRUG SCREEN, URINE    Collection Time: 17 12:05 PM   Result Value Ref Range    BENZODIAZEPINE NEGATIVE  NEG      BARBITURATES NEGATIVE  NEG      THC (TH-CANNABINOL) POSITIVE (A) NEG      OPIATES NEGATIVE  NEG      PCP(PHENCYCLIDINE) NEGATIVE  NEG      COCAINE NEGATIVE  NEG      AMPHETAMINE NEGATIVE  NEG      METHADONE NEGATIVE  NEG      HDSCOM (NOTE)    METABOLIC PANEL, BASIC    Collection Time: 17  5:12 PM   Result Value Ref Range    Sodium 134 (L) 136 - 145 mmol/L    Potassium 4.2 3.5 - 5.5 mmol/L    Chloride 103 100 - 108 mmol/L    CO2 25 21 - 32 mmol/L    Anion gap 6 3.0 - 18 mmol/L    Glucose 82 74 - 99 mg/dL    BUN 7 7.0 - 18 MG/DL    Creatinine 0.68 0.6 - 1.3 MG/DL    BUN/Creatinine ratio 10 (L) 12 - 20      GFR est AA >60 >60 ml/min/1.73m2    GFR est non-AA >60 >60 ml/min/1.73m2    Calcium 8.1 (L) 8.5 - 10.1 MG/DL   CBC W/O DIFF    Collection Time: 17  5:12 PM   Result Value Ref Range    WBC 12.7 4.6 - 13.2 K/uL    RBC 2.49 (L) 4.70 - 5.50 M/uL    HGB 6.6 (L) 13.0 - 16.0 g/dL    HCT 19.5 (L) 36.0 - 48.0 %    MCV 78.3 74.0 - 97.0 FL    MCH 26.5 24.0 - 34.0 PG    MCHC 33.8 31.0 - 37.0 g/dL    RDW 16.7 (H) 11.6 - 14.5 %    PLATELET 051 (H) 432 - 420 K/uL    MPV 8.6 (L) 9.2 - 25.9 FL   METABOLIC PANEL, BASIC    Collection Time: 06/28/17  5:55 AM   Result Value Ref Range    Sodium 134 (L) 136 - 145 mmol/L    Potassium 4.0 3.5 - 5.5 mmol/L    Chloride 101 100 - 108 mmol/L    CO2 25 21 - 32 mmol/L    Anion gap 8 3.0 - 18 mmol/L    Glucose 90 74 - 99 mg/dL    BUN 7 7.0 - 18 MG/DL    Creatinine 0.63 0.6 - 1.3 MG/DL    BUN/Creatinine ratio 11 (L) 12 - 20      GFR est AA >60 >60 ml/min/1.73m2    GFR est non-AA >60 >60 ml/min/1.73m2    Calcium 8.4 (L) 8.5 - 10.1 MG/DL   CBC W/O DIFF    Collection Time: 06/28/17  5:55 AM   Result Value Ref Range    WBC 18.4 (H) 4.6 - 13.2 K/uL    RBC 2.59 (L) 4.70 - 5.50 M/uL    HGB 7.0 (L) 13.0 - 16.0 g/dL    HCT 20.7 (L) 36.0 - 48.0 %    MCV 79.9 74.0 - 97.0 FL    MCH 27.0 24.0 - 34.0 PG    MCHC 33.8 31.0 - 37.0 g/dL    RDW 17.0 (H) 11.6 - 14.5 %    PLATELET 082 (H) 813 - 420 K/uL    MPV 8.6 (L) 9.2 - 11.8 FL       Assessment:     Active Problems: Aortoiliac occlusive disease ([facility] (1/25/2017)      PAD (peripheral artery disease) ([address] (6/27/2017)      Pseudoaneurysm of femoral artery ([facility] (6/27/2017)        Plan/Recommendations/Medical Decision Making:     Transfuse prbcs  Consult ID  Wound care to troubleshoot vac and follow up wound care for right foot      --blood transfusion is from acute blood loss anemia secondary to surgery.

## 2017-06-29 ENCOUNTER — HOME CARE VISIT (OUTPATIENT)
Dept: HOME HEALTH SERVICES | Facility: HOME HEALTH | Age: 57
End: 2017-06-29
Payer: COMMERCIAL

## 2017-06-29 LAB
ANION GAP BLD CALC-SCNC: 8 MMOL/L (ref 3–18)
BUN SERPL-MCNC: 5 MG/DL (ref 7–18)
BUN/CREAT SERPL: 8 (ref 12–20)
CALCIUM SERPL-MCNC: 8.6 MG/DL (ref 8.5–10.1)
CHLORIDE SERPL-SCNC: 97 MMOL/L (ref 100–108)
CO2 SERPL-SCNC: 29 MMOL/L (ref 21–32)
CREAT SERPL-MCNC: 0.64 MG/DL (ref 0.6–1.3)
ERYTHROCYTE [DISTWIDTH] IN BLOOD BY AUTOMATED COUNT: 17 % (ref 11.6–14.5)
GLUCOSE SERPL-MCNC: 96 MG/DL (ref 74–99)
HCT VFR BLD AUTO: 30.8 % (ref 36–48)
HGB BLD-MCNC: 10.8 G/DL (ref 13–16)
MCH RBC QN AUTO: 29 PG (ref 24–34)
MCHC RBC AUTO-ENTMCNC: 35.1 G/DL (ref 31–37)
MCV RBC AUTO: 82.6 FL (ref 74–97)
PLATELET # BLD AUTO: 516 K/UL (ref 135–420)
PMV BLD AUTO: 8.7 FL (ref 9.2–11.8)
POTASSIUM SERPL-SCNC: 3.6 MMOL/L (ref 3.5–5.5)
RBC # BLD AUTO: 3.73 M/UL (ref 4.7–5.5)
SODIUM SERPL-SCNC: 134 MMOL/L (ref 136–145)
WBC # BLD AUTO: 13 K/UL (ref 4.6–13.2)

## 2017-06-29 PROCEDURE — 74011250637 HC RX REV CODE- 250/637: Performed by: SURGERY

## 2017-06-29 PROCEDURE — 74011250636 HC RX REV CODE- 250/636: Performed by: SURGERY

## 2017-06-29 PROCEDURE — 65270000029 HC RM PRIVATE

## 2017-06-29 PROCEDURE — 85027 COMPLETE CBC AUTOMATED: CPT | Performed by: SURGERY

## 2017-06-29 PROCEDURE — 80048 BASIC METABOLIC PNL TOTAL CA: CPT | Performed by: SURGERY

## 2017-06-29 PROCEDURE — 36415 COLL VENOUS BLD VENIPUNCTURE: CPT | Performed by: SURGERY

## 2017-06-29 RX ADMIN — Medication 220 MG: at 08:35

## 2017-06-29 RX ADMIN — DULOXETINE HYDROCHLORIDE 60 MG: 60 CAPSULE, DELAYED RELEASE ORAL at 08:36

## 2017-06-29 RX ADMIN — ATORVASTATIN CALCIUM 40 MG: 40 TABLET, FILM COATED ORAL at 21:03

## 2017-06-29 RX ADMIN — HEPARIN SODIUM 5000 UNITS: 5000 INJECTION, SOLUTION INTRAVENOUS; SUBCUTANEOUS at 21:03

## 2017-06-29 RX ADMIN — GABAPENTIN 100 MG: 100 CAPSULE ORAL at 08:36

## 2017-06-29 RX ADMIN — DOCUSATE SODIUM 100 MG: 100 CAPSULE, LIQUID FILLED ORAL at 08:36

## 2017-06-29 RX ADMIN — CLOPIDOGREL 75 MG: 75 TABLET, FILM COATED ORAL at 16:42

## 2017-06-29 RX ADMIN — AMIODARONE HYDROCHLORIDE 200 MG: 200 TABLET ORAL at 08:38

## 2017-06-29 RX ADMIN — Medication 250 MG: at 08:35

## 2017-06-29 RX ADMIN — ASPIRIN 81 MG CHEWABLE TABLET 81 MG: 81 TABLET CHEWABLE at 08:38

## 2017-06-29 RX ADMIN — METOPROLOL TARTRATE 12.5 MG: 25 TABLET ORAL at 21:04

## 2017-06-29 RX ADMIN — Medication 10 ML: at 14:00

## 2017-06-29 RX ADMIN — DILTIAZEM HYDROCHLORIDE 30 MG: 30 TABLET, FILM COATED ORAL at 16:42

## 2017-06-29 RX ADMIN — Medication 10 ML: at 06:36

## 2017-06-29 RX ADMIN — AMIODARONE HYDROCHLORIDE 200 MG: 200 TABLET ORAL at 18:23

## 2017-06-29 RX ADMIN — LOSARTAN POTASSIUM 25 MG: 25 TABLET, FILM COATED ORAL at 08:35

## 2017-06-29 RX ADMIN — GABAPENTIN 100 MG: 100 CAPSULE ORAL at 18:23

## 2017-06-29 RX ADMIN — VITAMIN D, TAB 1000IU (100/BT) 1000 UNITS: 25 TAB at 08:37

## 2017-06-29 RX ADMIN — HEPARIN SODIUM 5000 UNITS: 5000 INJECTION, SOLUTION INTRAVENOUS; SUBCUTANEOUS at 16:41

## 2017-06-29 RX ADMIN — FERROUS SULFATE TAB 325 MG (65 MG ELEMENTAL FE) 325 MG: 325 (65 FE) TAB at 08:36

## 2017-06-29 RX ADMIN — OXYCODONE AND ACETAMINOPHEN 1 TABLET: 5; 325 TABLET ORAL at 21:03

## 2017-06-29 RX ADMIN — Medication 10 ML: at 21:19

## 2017-06-29 RX ADMIN — METOPROLOL TARTRATE 12.5 MG: 25 TABLET ORAL at 08:37

## 2017-06-29 RX ADMIN — DILTIAZEM HYDROCHLORIDE 30 MG: 30 TABLET, FILM COATED ORAL at 21:18

## 2017-06-29 RX ADMIN — CYANOCOBALAMIN TAB 1000 MCG 1000 MCG: 1000 TAB at 08:37

## 2017-06-29 RX ADMIN — DOCUSATE SODIUM 100 MG: 100 CAPSULE, LIQUID FILLED ORAL at 18:23

## 2017-06-29 RX ADMIN — OXYCODONE AND ACETAMINOPHEN 1 TABLET: 5; 325 TABLET ORAL at 16:42

## 2017-06-29 RX ADMIN — DILTIAZEM HYDROCHLORIDE 30 MG: 30 TABLET, FILM COATED ORAL at 08:37

## 2017-06-29 RX ADMIN — HEPARIN SODIUM 5000 UNITS: 5000 INJECTION, SOLUTION INTRAVENOUS; SUBCUTANEOUS at 06:36

## 2017-06-29 NOTE — PROGRESS NOTES
Verbal shift change report given to Barbara Merida RN (oncoming nurse) by Phillip Hernandez Rn (offgoing nurse). Report included the following information SBAR, Kardex, Procedure Summary, Recent Results and Cardiac Rhythm NSR.

## 2017-06-29 NOTE — ROUTINE PROCESS
TRANSFER - OUT REPORT:    Verbal report given to BEAU Whitley(name) on Carlos Marmolejo  being transferred to [address] [place] [address](unit) for routine progression of care       Report consisted of patients Situation, Background, Assessment and   Recommendations(SBAR). Information from the following report(s) SBAR, Kardex and Recent Results was reviewed with the receiving nurse. Lines:   Peripheral IV 06/27/17 Left Antecubital (Active)   Site Assessment Clean, dry, & intact 6/29/2017  4:00 PM   Phlebitis Assessment 0 6/29/2017  4:00 PM   Infiltration Assessment 0 6/29/2017  4:00 PM   Dressing Status Clean, dry, & intact 6/29/2017  4:00 PM   Dressing Type Tape;Transparent 6/29/2017  4:00 PM   Hub Color/Line Status End cap changed 6/28/2017  8:00 AM        Opportunity for questions and clarification was provided.       Patient transported with:   Monitor

## 2017-06-29 NOTE — PROGRESS NOTES
Patient feels good. Healing. No current issues. Good to transfer to floor. Once wound vac set can discharge. Hopefully tomorrow. Will need right leg angio likely next week instead of today.

## 2017-06-30 ENCOUNTER — TELEPHONE (OUTPATIENT)
Dept: VASCULAR SURGERY | Age: 57
End: 2017-06-30

## 2017-06-30 PROCEDURE — 77030019934 HC DRSG VAC ASST KCON -B

## 2017-06-30 PROCEDURE — 77030018836 HC SOL IRR NACL ICUM -A

## 2017-06-30 PROCEDURE — 74011250637 HC RX REV CODE- 250/637: Performed by: SURGERY

## 2017-06-30 PROCEDURE — 65270000029 HC RM PRIVATE

## 2017-06-30 PROCEDURE — 74011250636 HC RX REV CODE- 250/636: Performed by: SURGERY

## 2017-06-30 RX ADMIN — Medication 250 MG: at 09:20

## 2017-06-30 RX ADMIN — ATORVASTATIN CALCIUM 40 MG: 40 TABLET, FILM COATED ORAL at 21:46

## 2017-06-30 RX ADMIN — GABAPENTIN 100 MG: 100 CAPSULE ORAL at 17:26

## 2017-06-30 RX ADMIN — DILTIAZEM HYDROCHLORIDE 30 MG: 30 TABLET, FILM COATED ORAL at 16:39

## 2017-06-30 RX ADMIN — OXYCODONE AND ACETAMINOPHEN 1 TABLET: 5; 325 TABLET ORAL at 10:33

## 2017-06-30 RX ADMIN — OXYCODONE AND ACETAMINOPHEN 1 TABLET: 5; 325 TABLET ORAL at 05:56

## 2017-06-30 RX ADMIN — Medication 10 ML: at 06:00

## 2017-06-30 RX ADMIN — GABAPENTIN 100 MG: 100 CAPSULE ORAL at 09:20

## 2017-06-30 RX ADMIN — CYANOCOBALAMIN TAB 1000 MCG 1000 MCG: 1000 TAB at 09:20

## 2017-06-30 RX ADMIN — FERROUS SULFATE TAB 325 MG (65 MG ELEMENTAL FE) 325 MG: 325 (65 FE) TAB at 09:20

## 2017-06-30 RX ADMIN — OXYCODONE AND ACETAMINOPHEN 1 TABLET: 5; 325 TABLET ORAL at 01:18

## 2017-06-30 RX ADMIN — OXYCODONE AND ACETAMINOPHEN 1 TABLET: 5; 325 TABLET ORAL at 16:38

## 2017-06-30 RX ADMIN — DOCUSATE SODIUM 100 MG: 100 CAPSULE, LIQUID FILLED ORAL at 17:26

## 2017-06-30 RX ADMIN — AMIODARONE HYDROCHLORIDE 200 MG: 200 TABLET ORAL at 17:26

## 2017-06-30 RX ADMIN — Medication 10 ML: at 21:57

## 2017-06-30 RX ADMIN — DILTIAZEM HYDROCHLORIDE 30 MG: 30 TABLET, FILM COATED ORAL at 10:33

## 2017-06-30 RX ADMIN — DULOXETINE HYDROCHLORIDE 60 MG: 60 CAPSULE, DELAYED RELEASE ORAL at 09:20

## 2017-06-30 RX ADMIN — DILTIAZEM HYDROCHLORIDE 30 MG: 30 TABLET, FILM COATED ORAL at 05:56

## 2017-06-30 RX ADMIN — DILTIAZEM HYDROCHLORIDE 30 MG: 30 TABLET, FILM COATED ORAL at 21:47

## 2017-06-30 RX ADMIN — CLOPIDOGREL 75 MG: 75 TABLET, FILM COATED ORAL at 16:39

## 2017-06-30 RX ADMIN — VITAMIN D, TAB 1000IU (100/BT) 1000 UNITS: 25 TAB at 09:20

## 2017-06-30 RX ADMIN — DOCUSATE SODIUM 100 MG: 100 CAPSULE, LIQUID FILLED ORAL at 09:20

## 2017-06-30 RX ADMIN — LOSARTAN POTASSIUM 25 MG: 25 TABLET, FILM COATED ORAL at 09:20

## 2017-06-30 RX ADMIN — METOPROLOL TARTRATE 12.5 MG: 25 TABLET ORAL at 09:20

## 2017-06-30 RX ADMIN — METOPROLOL TARTRATE 12.5 MG: 25 TABLET ORAL at 21:47

## 2017-06-30 RX ADMIN — Medication 220 MG: at 09:20

## 2017-06-30 RX ADMIN — HEPARIN SODIUM 5000 UNITS: 5000 INJECTION, SOLUTION INTRAVENOUS; SUBCUTANEOUS at 21:46

## 2017-06-30 RX ADMIN — Medication 5 ML: at 14:00

## 2017-06-30 RX ADMIN — AMIODARONE HYDROCHLORIDE 200 MG: 200 TABLET ORAL at 09:20

## 2017-06-30 RX ADMIN — OXYCODONE AND ACETAMINOPHEN 1 TABLET: 5; 325 TABLET ORAL at 21:55

## 2017-06-30 RX ADMIN — ASPIRIN 81 MG CHEWABLE TABLET 81 MG: 81 TABLET CHEWABLE at 09:20

## 2017-06-30 NOTE — ROUTINE PROCESS
Bedside and Verbal shift change report given to Lily-Mikaela (oncoming nurse) by Lilian Coronel RN (offgoing nurse). Report included the following information SBAR, Kardex, MAR and Recent Results. SITUATION:    Code Status: Full Code   Reason for Admission: R19.01 RIGHT QUADRANT MASS ON BYPASS   [address]ortoiliac occlusive disease ([facility] [address]   PAD (peripheral artery disease) ([address]   Pseudoaneurysm of femoral artery ([address]    [facility] day: 3   Problem List:       Hospital Problems  Date Reviewed: 6/27/2017          Codes Class Noted POA    PAD (peripheral artery disease) ([facility] [address] ICD-10-CM: I73.9  ICD-9-CM: 443.9  6/27/2017 Unknown        Pseudoaneurysm of femoral artery ([facility] [address] ICD-10-CM: I72.4  ICD-9-CM: 442.3  6/27/2017 Unknown        Aortoiliac occlusive disease ([facility] [address] (Chronic) ICD-10-CM: C36.40  ICD-9-CM: 444.09  1/25/2017 Unknown              BACKGROUND:    Past Medical History:   Past Medical History:   Diagnosis Date    Acute blood loss as cause of postoperative anemia 4/4/2017    Anticoagulated on Coumadin     Aortoiliac occlusive disease ([address] 1/25/2017    Atherosclerosis of native artery of both lower extremities with intermittent claudication ([address] 1/25/2017    Benign hypertensive heart disease with systolic congestive heart failure, NYHA class 2 ([address] 1/26/2015    Carotid artery disease (HCC)     Chronic atrial fibrillation (HCC)     Chronic systolic heart failure (HCC)     Chronic ulcer of right foot ([address] 4/4/2017    Coronary artery disease involving native coronary artery of native heart 3/15/2017    Successful stenting of Cx (Xience LESLEY) and RCA (Xience LESLEY) to 0% by Dr. Christian Rivas on 3/15/17.     DDD (degenerative disc disease), lumbar 1/26/2015    Dyslipidemia     Erectile dysfunction 7/5/2016    Euthyroid sick syndrome 4/6/2017    Hereditary peripheral neuropathy 11/15/2016    History of cardioversion 4/18/2017    S/P Synchronized external cardioversion (4/18/2017 - Dr. Cary Bass)   [facility] Hypertension     Hyperuricemia     Ischemic cardiomyopathy     Peripheral artery disease ([facility] [address] 1/25/2017    Spinal stenosis of lumbar region with radiculopathy 5/4/2015    Dr. Renetta Cabrera Vitamin D deficiency 4/22/2017    Vitamin D 25-Hydroxy (4/22/2017) = 12.0         Patient taking anticoagulants yes     ASSESSMENT:    Changes in Assessment Throughout Shift: See MAR     Patient has Central Line: no Reasons if yes:    Patient has Jiang Cath: no Reasons if yes:       Last Vitals:     Vitals:    06/29/17 1029 06/29/17 1059 06/29/17 1600 06/29/17 1934   BP: 105/43 119/53 112/58 131/55   Pulse: 60 (!) 56 65 64   Resp: 24 21 19 18   Temp:   98.3 °F (36.8 °C) 97.8 °F (36.6 °C)   SpO2: 92% 100% 100% 99%   Weight:    75.9 kg (167 lb 4.9 oz)   Height:            IV and DRAINS (will only show if present)   Peripheral IV 06/27/17 Left Antecubital-Site Assessment: Clean, dry, & intact  Vacuum Assisted Closure Left Groin-Site Assessment: Clean, dry, & intact     WOUND (if present)   Wound Type:  none   Dressing present Dressing Present : No   Wound Concerns/Notes:  none     PAIN    Pain Assessment    Pain Intensity 1: 0 (06/30/17 0211)    Pain Location 1: Foot    Pain Intervention(s) 1: Medication (see MAR)    Patient Stated Pain Goal: 0  o Interventions for Pain:  none  o Intervention effective: n/a  o Time of last intervention: n/a   o Reassessment Completed: n/a     Last 3 Weights:  Last 3 Recorded Weights in this Encounter    06/27/17 1118 06/28/17 0809 06/29/17 1934   Weight: 74.8 kg (165 lb) 75.9 kg (167 lb 4.8 oz) 75.9 kg (167 lb 4.9 oz)     Weight change: 0.002 kg (0.1 oz)     INTAKE/OUPUT    Current Shift: 06/29 1901 - 06/30 0700  In: -   Out: 200 [Urine:200]    Last three shifts: 06/28 0701 - 06/29 1900  In: 2604.5 [P.O.:1100;  I.V.:250]  Out: 6383 [Urine:7675; Drains:300]     LAB RESULTS     Recent Labs      06/29/17   0610  06/28/17   0555  06/27/17   1712   WBC  13.0  18.4*  12.7   HGB  10.8* 7. 0*  6.6*   HCT  30.8*  20.7*  19.5*   PLT  516*  566*  515*        Recent Labs      06/29/17   0610  06/28/17   0555  06/27/17   1712   NA  134*  134*  134*   K  3.6  4.0  4.2   GLU  96  90  82   BUN  5*  7  7   CREA  0.64  0.63  0.68   CA  8.6  8.4*  8.1*       RECOMMENDATIONS AND DISCHARGE PLANNING     1. Pending tests/procedures/ Plan of Care or Other Needs: TBD     2. Discharge plan for patient and Needs/Barriers: TBD    3. Estimated Discharge Date: TBD Posted on Whiteboard in Patients Room: no      4. The patient's care plan was reviewed with the oncoming nurse. \"HEALS\" SAFETY CHECK      Fall Risk    Total Score: 2    Safety Measures: Safety Measures: Bed/Chair-Wheels locked, Bed in low position, Call light within reach, Side rails X 3    A safety check occurred in the patient's room between off going nurse and oncoming nurse listed above. The safety check included the below items  Area Items   H  High Alert Medications - Verify all high alert medication drips (heparin, PCA, etc.)   E  Equipment - Suction is set up for ALL patients (with cailin)  - Red plugs utilized for all equipment (IV pumps, etc.)  - WOWs wiped down at end of shift.  - Room stocked with oxygen, suction, and other unit-specific supplies   A  Alarms - Bed alarm is set for fall risk patients  - Ensure chair alarm is in place and activated if patient is up in a chair   L  Lines - Check IV for any infiltration  - Jiang bag is empty if patient has a Jiang   - Tubing and IV bags are labeled   S  Safety   - Room is clean, patient is clean, and equipment is clean. - Hallways are clear from equipment besides carts. - Fall bracelet on for fall risk patients  - Ensure room is clear and free of clutter  - Suction is set up for ALL patients (with cailin)  - Hallways are clear from equipment besides carts.    - Isolation precautions followed, supplies available outside room, sign posted     Evie Lugo RN

## 2017-06-30 NOTE — ROUTINE PROCESS
Patient stable, no signs of distress. Patients wound vac dressing changed per order. Length of wound 9 inches, width 2 inches, depth 1 inch. Wound draining.

## 2017-06-30 NOTE — ROUTINE PROCESS
Patient stable, no signs of distress. Patient in bed sleeping. Patients pain controlled with pain medication.

## 2017-06-30 NOTE — ROUTINE PROCESS
Patient stable, no signs of distress. Patient up in bed. Patients pain controlled with pain medication.

## 2017-06-30 NOTE — PROGRESS NOTES
Pt doing well  Awaiting wound vac which is supposed to be delivered to bedside  Once available, ok for d/c with home health  That may be later today/tomorrow

## 2017-06-30 NOTE — PROGRESS NOTES
0451- LLQ site where heparin was administered, profusely bleeding. Saturated gown and bed sheets. Applied pressure dressing to site and held 0500 scheduled heparin. Will continue to monitor.

## 2017-06-30 NOTE — ROUTINE PROCESS
Bedside and Verbal shift change report given to [address] (oncoming nurse) by Татьяна Nguyen (offgoing nurse). Report included the following information SBAR, Kardex, MAR and Recent Results. SITUATION:    Code Status: Full Code   Reason for Admission: R19.01 RIGHT QUADRANT MASS ON BYPASS   Aortoiliac occlusive disease ([address]   PAD (peripheral artery disease) ([address]   Pseudoaneurysm of femoral artery ([address]    [facility] day: 3   Problem List:       Hospital Problems  Date Reviewed: 6/27/2017          Codes Class Noted POA    PAD (peripheral artery disease) ([facility] ICD-10-CM: I73.9  ICD-9-CM: 443.9  6/27/2017 Unknown        Pseudoaneurysm of femoral artery ([facility] [address] ICD-10-CM: I72.4  ICD-9-CM: 442.3  6/27/2017 Unknown        Aortoiliac occlusive disease ([facility] [address] (Chronic) ICD-10-CM: R27.28  ICD-9-CM: 444.09  1/25/2017 Unknown              BACKGROUND:    Past Medical History:   Past Medical History:   Diagnosis Date    Acute blood loss as cause of postoperative anemia 4/4/2017    Anticoagulated on Coumadin     Aortoiliac occlusive disease ([address] 1/25/2017    Atherosclerosis of native artery of both lower extremities with intermittent claudication ([address] 1/25/2017    Benign hypertensive heart disease with systolic congestive heart failure, NYHA class 2 ([address] 1/26/2015    Carotid artery disease (HCC)     Chronic atrial fibrillation (HCC)     Chronic systolic heart failure (HCC)     Chronic ulcer of right foot ([address] 4/4/2017    Coronary artery disease involving native coronary artery of native heart 3/15/2017    Successful stenting of Cx (Xience LESLEY) and RCA (Xience LESLEY) to 0% by Dr. Denis Powell on 3/15/17.     DDD (degenerative disc disease), lumbar 1/26/2015    Dyslipidemia     Erectile dysfunction 7/5/2016    Euthyroid sick syndrome 4/6/2017    Hereditary peripheral neuropathy 11/15/2016    History of cardioversion 4/18/2017    S/P Synchronized external cardioversion (4/18/2017 - Dr. Chantel Martin)    Hypertension  Hyperuricemia     Ischemic cardiomyopathy     Peripheral artery disease ([facility] [address] 1/25/2017    Spinal stenosis of lumbar region with radiculopathy 5/4/2015    Dr. Fran Lezama Vitamin D deficiency 4/22/2017    Vitamin D 25-Hydroxy (4/22/2017) = 12.0         Patient taking anticoagulants yes     ASSESSMENT:    Changes in Assessment Throughout Shift: wound vac dressing changed     Patient has Central Line: no Reasons if yes:    Patient has Jiang Cath: no Reasons if yes:       Last Vitals:     Vitals:    06/30/17 0400 06/30/17 0959 06/30/17 1242 06/30/17 1649   BP: 130/61 117/64 124/75 130/74   Pulse: 63 67 63 64   Resp: 17 18 19 17   Temp: 98.1 °F (36.7 °C) 98.2 °F (36.8 °C) 98.3 °F (36.8 °C) 99.7 °F (37.6 °C)   SpO2: 97% 97% 98% 95%   Weight:       Height:            IV and DRAINS (will only show if present)   Peripheral IV 06/27/17 Left Antecubital-Site Assessment: Clean, dry, & intact  Vacuum Assisted Closure Left Groin-Site Assessment: Drainage (comment)     WOUND (if present)   Wound Type:  none, wound vac   Dressing present Dressing Present : No   Wound Concerns/Notes:  none     PAIN    Pain Assessment    Pain Intensity 1: 6 (06/30/17 1642)    Pain Location 1: Groin    Pain Intervention(s) 1: Medication (see MAR)    Patient Stated Pain Goal: 0  o Interventions for Pain:  none, Norco  o Intervention effective: yes  o Time of last intervention: 1638   o Reassessment Completed: yes      Last 3 Weights:  Last 3 Recorded Weights in this Encounter    06/27/17 1118 06/28/17 0809 06/29/17 1934   Weight: 74.8 kg (165 lb) 75.9 kg (167 lb 4.8 oz) 75.9 kg (167 lb 4.9 oz)     Weight change: 0.002 kg (0.1 oz)     INTAKE/OUPUT    Current Shift: 06/30 0701 - 06/30 1900  In: -   Out: 450 [Urine:450]    Last three shifts: 06/28 1901 - 06/30 0700  In: 1144.5 [P.O.:500]  Out: 4750 [Urine:4650; Drains:100]     LAB RESULTS     Recent Labs      06/29/17   0610  06/28/17   0555   WBC  13.0  18.4*   HGB  10.8* 7.0*   HCT  30.8*  20.7*   PLT  516*  566*        Recent Labs      06/29/17   0610  06/28/17   0555   NA  134*  134*   K  3.6  4.0   GLU  96  90   BUN  5*  7   CREA  0.64  0.63   CA  8.6  8.4*       RECOMMENDATIONS AND DISCHARGE PLANNING     1. Pending tests/procedures/ Plan of Care or Other Needs: n/a    2. Discharge plan for patient and Needs/Barriers: home health    3. Estimated Discharge Date: 07/01/17 Posted on Whiteboard in Patients Room: no      4. The patient's care plan was reviewed with the oncoming nurse. \"HEALS\" SAFETY CHECK      Fall Risk    Total Score: 2    Safety Measures: Safety Measures: Bed/Chair-Wheels locked, Bed in low position, Call light within reach, Gripper socks, Side rails X 3    A safety check occurred in the patient's room between off going nurse and oncoming nurse listed above. The safety check included the below items  Area Items   H  High Alert Medications - Verify all high alert medication drips (heparin, PCA, etc.)   E  Equipment - Suction is set up for ALL patients (with cailin)  - Red plugs utilized for all equipment (IV pumps, etc.)  - WOWs wiped down at end of shift.  - Room stocked with oxygen, suction, and other unit-specific supplies   A  Alarms - Bed alarm is set for fall risk patients  - Ensure chair alarm is in place and activated if patient is up in a chair   L  Lines - Check IV for any infiltration  - Jiang bag is empty if patient has a Jiang   - Tubing and IV bags are labeled   S  Safety   - Room is clean, patient is clean, and equipment is clean. - Hallways are clear from equipment besides carts. - Fall bracelet on for fall risk patients  - Ensure room is clear and free of clutter  - Suction is set up for ALL patients (with cailin)  - Hallways are clear from equipment besides carts.    - Isolation precautions followed, supplies available outside room, sign posted     Eileen Kolb

## 2017-06-30 NOTE — PROGRESS NOTES
0106- No nausea or vomiting. Pain to BLE controlled with PO PRN pain medication. Patient voiding reg colored urine in urinal at bedside. Wound vac in place, clean, dry, and intact.

## 2017-07-01 ENCOUNTER — ANESTHESIA (OUTPATIENT)
Dept: CARDIOTHORACIC SURGERY | Age: 57
DRG: 253 | End: 2017-07-01
Payer: COMMERCIAL

## 2017-07-01 ENCOUNTER — ANESTHESIA EVENT (OUTPATIENT)
Dept: CARDIOTHORACIC SURGERY | Age: 57
DRG: 253 | End: 2017-07-01
Payer: COMMERCIAL

## 2017-07-01 LAB
APTT PPP: 37.9 SEC (ref 23–36.4)
BACTERIA SPEC CULT: NORMAL
ERYTHROCYTE [DISTWIDTH] IN BLOOD BY AUTOMATED COUNT: 18.3 % (ref 11.6–14.5)
GLUCOSE BLD STRIP.AUTO-MCNC: 120 MG/DL (ref 70–110)
GRAM STN SPEC: NORMAL
GRAM STN SPEC: NORMAL
HCT VFR BLD AUTO: 27 % (ref 36–48)
HGB BLD-MCNC: 9.1 G/DL (ref 13–16)
INR PPP: 1.2 (ref 0.8–1.2)
MCH RBC QN AUTO: 28.3 PG (ref 24–34)
MCHC RBC AUTO-ENTMCNC: 33.7 G/DL (ref 31–37)
MCV RBC AUTO: 83.9 FL (ref 74–97)
PLATELET # BLD AUTO: 426 K/UL (ref 135–420)
PMV BLD AUTO: 8.5 FL (ref 9.2–11.8)
PROTHROMBIN TIME: 15.1 SEC (ref 11.5–15.2)
RBC # BLD AUTO: 3.22 M/UL (ref 4.7–5.5)
SERVICE CMNT-IMP: NORMAL
WBC # BLD AUTO: 13.8 K/UL (ref 4.6–13.2)

## 2017-07-01 PROCEDURE — 77030034850: Performed by: SURGERY

## 2017-07-01 PROCEDURE — 76060000034 HC ANESTHESIA 1.5 TO 2 HR: Performed by: SURGERY

## 2017-07-01 PROCEDURE — P9016 RBC LEUKOCYTES REDUCED: HCPCS | Performed by: PHYSICIAN ASSISTANT

## 2017-07-01 PROCEDURE — 77030005401 HC CATH RAD ARRO -A: Performed by: SURGERY

## 2017-07-01 PROCEDURE — 77030012059: Performed by: SURGERY

## 2017-07-01 PROCEDURE — 77030020788: Performed by: SURGERY

## 2017-07-01 PROCEDURE — 74011250637 HC RX REV CODE- 250/637: Performed by: SURGERY

## 2017-07-01 PROCEDURE — 77030010512 HC APPL CLP LIG J&J -C: Performed by: SURGERY

## 2017-07-01 PROCEDURE — 74011250636 HC RX REV CODE- 250/636

## 2017-07-01 PROCEDURE — 85027 COMPLETE CBC AUTOMATED: CPT | Performed by: ANESTHESIOLOGY

## 2017-07-01 PROCEDURE — 77030018836 HC SOL IRR NACL ICUM -A: Performed by: SURGERY

## 2017-07-01 PROCEDURE — 85610 PROTHROMBIN TIME: CPT | Performed by: ANESTHESIOLOGY

## 2017-07-01 PROCEDURE — 74011000250 HC RX REV CODE- 250

## 2017-07-01 PROCEDURE — 76060000033 HC ANESTHESIA 1 TO 1.5 HR: Performed by: SURGERY

## 2017-07-01 PROCEDURE — 85730 THROMBOPLASTIN TIME PARTIAL: CPT | Performed by: ANESTHESIOLOGY

## 2017-07-01 PROCEDURE — 77030002996 HC SUT SLK J&J -A: Performed by: SURGERY

## 2017-07-01 PROCEDURE — 65610000006 HC RM INTENSIVE CARE

## 2017-07-01 PROCEDURE — 77030018390 HC SPNG HEMSTAT2 J&J -B: Performed by: SURGERY

## 2017-07-01 PROCEDURE — 74011250636 HC RX REV CODE- 250/636: Performed by: NURSE ANESTHETIST, CERTIFIED REGISTERED

## 2017-07-01 PROCEDURE — 82962 GLUCOSE BLOOD TEST: CPT

## 2017-07-01 PROCEDURE — 76010000113 HC CV SURG 1 TO 1.5 HR: Performed by: SURGERY

## 2017-07-01 PROCEDURE — 77030008467 HC STPLR SKN COVD -B: Performed by: SURGERY

## 2017-07-01 PROCEDURE — 74011000272 HC RX REV CODE- 272: Performed by: SURGERY

## 2017-07-01 PROCEDURE — 74011250636 HC RX REV CODE- 250/636: Performed by: SURGERY

## 2017-07-01 PROCEDURE — 77030002986 HC SUT PROL J&J -A: Performed by: SURGERY

## 2017-07-01 PROCEDURE — 76010000129 HC CV SURG 1.5 TO 2 HR: Performed by: SURGERY

## 2017-07-01 PROCEDURE — 77030002912 HC SUT ETHBND J&J -A: Performed by: SURGERY

## 2017-07-01 PROCEDURE — 77030011640 HC PAD GRND REM COVD -A: Performed by: SURGERY

## 2017-07-01 PROCEDURE — 74011000250 HC RX REV CODE- 250: Performed by: SURGERY

## 2017-07-01 PROCEDURE — 04WY0JZ REVISION OF SYNTHETIC SUBSTITUTE IN LOWER ARTERY, OPEN APPROACH: ICD-10-PCS | Performed by: SURGERY

## 2017-07-01 PROCEDURE — 77030013797 HC KT TRNSDUC PRSSR EDWD -A: Performed by: SURGERY

## 2017-07-01 PROCEDURE — 74011000258 HC RX REV CODE- 258: Performed by: SURGERY

## 2017-07-01 PROCEDURE — 77030031139 HC SUT VCRL2 J&J -A: Performed by: SURGERY

## 2017-07-01 PROCEDURE — 77030020782 HC GWN BAIR PAWS FLX 3M -B: Performed by: SURGERY

## 2017-07-01 PROCEDURE — 77030025414 HC DRSG VAC ASST SMPLC KCON -B

## 2017-07-01 PROCEDURE — 77030013079 HC BLNKT BAIR HGGR 3M -A: Performed by: ANESTHESIOLOGY

## 2017-07-01 PROCEDURE — 77030002933 HC SUT MCRYL J&J -A: Performed by: SURGERY

## 2017-07-01 PROCEDURE — 77030013079 HC BLNKT BAIR HGGR 3M -A: Performed by: SURGERY

## 2017-07-01 PROCEDURE — 77030002966 HC SUT PDS J&J -A: Performed by: SURGERY

## 2017-07-01 PROCEDURE — 77030008683 HC TU ET CUF COVD -A: Performed by: ANESTHESIOLOGY

## 2017-07-01 PROCEDURE — C1751 CATH, INF, PER/CENT/MIDLINE: HCPCS | Performed by: SURGERY

## 2017-07-01 PROCEDURE — 77030013533 HC SEAL FBRN TISSL RDYTUSE 10ML BAXT -E: Performed by: SURGERY

## 2017-07-01 PROCEDURE — 77030019934 HC DRSG VAC ASST KCON -B: Performed by: SURGERY

## 2017-07-01 PROCEDURE — 74011250636 HC RX REV CODE- 250/636: Performed by: ANESTHESIOLOGY

## 2017-07-01 PROCEDURE — 77030010514 HC APPL CLP LIG COVD -B: Performed by: SURGERY

## 2017-07-01 PROCEDURE — 77030005537 HC CATH URETH BARD -A: Performed by: SURGERY

## 2017-07-01 PROCEDURE — 77030018548 HC SUT ETHBND2 J&J -B: Performed by: SURGERY

## 2017-07-01 PROCEDURE — 0Y370ZZ CONTROL BLEEDING IN RIGHT FEMORAL REGION, OPEN APPROACH: ICD-10-PCS | Performed by: SURGERY

## 2017-07-01 RX ORDER — SUCCINYLCHOLINE CHLORIDE 20 MG/ML
INJECTION INTRAMUSCULAR; INTRAVENOUS AS NEEDED
Status: DISCONTINUED | OUTPATIENT
Start: 2017-07-01 | End: 2017-07-01 | Stop reason: HOSPADM

## 2017-07-01 RX ORDER — FENTANYL CITRATE 50 UG/ML
INJECTION, SOLUTION INTRAMUSCULAR; INTRAVENOUS AS NEEDED
Status: DISCONTINUED | OUTPATIENT
Start: 2017-07-01 | End: 2017-07-01 | Stop reason: HOSPADM

## 2017-07-01 RX ORDER — PROPOFOL 10 MG/ML
INJECTION, EMULSION INTRAVENOUS AS NEEDED
Status: DISCONTINUED | OUTPATIENT
Start: 2017-07-01 | End: 2017-07-01 | Stop reason: HOSPADM

## 2017-07-01 RX ORDER — FENTANYL CITRATE 50 UG/ML
50 INJECTION, SOLUTION INTRAMUSCULAR; INTRAVENOUS
Status: DISCONTINUED | OUTPATIENT
Start: 2017-07-01 | End: 2017-07-06

## 2017-07-01 RX ORDER — LIDOCAINE HYDROCHLORIDE 20 MG/ML
INJECTION, SOLUTION EPIDURAL; INFILTRATION; INTRACAUDAL; PERINEURAL AS NEEDED
Status: DISCONTINUED | OUTPATIENT
Start: 2017-07-01 | End: 2017-07-01 | Stop reason: HOSPADM

## 2017-07-01 RX ORDER — ONDANSETRON 2 MG/ML
4 INJECTION INTRAMUSCULAR; INTRAVENOUS ONCE
Status: ACTIVE | OUTPATIENT
Start: 2017-07-01 | End: 2017-07-02

## 2017-07-01 RX ORDER — INSULIN LISPRO 100 [IU]/ML
INJECTION, SOLUTION INTRAVENOUS; SUBCUTANEOUS ONCE
Status: ACTIVE | OUTPATIENT
Start: 2017-07-01 | End: 2017-07-02

## 2017-07-01 RX ORDER — DIPHENHYDRAMINE HYDROCHLORIDE 50 MG/ML
12.5 INJECTION, SOLUTION INTRAMUSCULAR; INTRAVENOUS
Status: DISCONTINUED | OUTPATIENT
Start: 2017-07-01 | End: 2017-07-06

## 2017-07-01 RX ORDER — MAGNESIUM SULFATE 100 %
4 CRYSTALS MISCELLANEOUS AS NEEDED
Status: DISCONTINUED | OUTPATIENT
Start: 2017-07-01 | End: 2017-07-06

## 2017-07-01 RX ORDER — SODIUM CHLORIDE, SODIUM LACTATE, POTASSIUM CHLORIDE, CALCIUM CHLORIDE 600; 310; 30; 20 MG/100ML; MG/100ML; MG/100ML; MG/100ML
75 INJECTION, SOLUTION INTRAVENOUS CONTINUOUS
Status: DISCONTINUED | OUTPATIENT
Start: 2017-07-01 | End: 2017-07-02

## 2017-07-01 RX ORDER — DEXTROSE 50 % IN WATER (D50W) INTRAVENOUS SYRINGE
25-50 AS NEEDED
Status: DISCONTINUED | OUTPATIENT
Start: 2017-07-01 | End: 2017-07-06

## 2017-07-01 RX ORDER — EPHEDRINE SULFATE/0.9% NACL/PF 25 MG/5 ML
SYRINGE (ML) INTRAVENOUS AS NEEDED
Status: DISCONTINUED | OUTPATIENT
Start: 2017-07-01 | End: 2017-07-01 | Stop reason: HOSPADM

## 2017-07-01 RX ORDER — HEPARIN SODIUM 200 [USP'U]/100ML
INJECTION, SOLUTION INTRAVENOUS
Status: DISPENSED
Start: 2017-07-01 | End: 2017-07-02

## 2017-07-01 RX ORDER — SODIUM CHLORIDE 9 MG/ML
INJECTION, SOLUTION INTRAVENOUS
Status: DISCONTINUED | OUTPATIENT
Start: 2017-07-01 | End: 2017-07-01 | Stop reason: HOSPADM

## 2017-07-01 RX ORDER — SODIUM CHLORIDE, SODIUM LACTATE, POTASSIUM CHLORIDE, CALCIUM CHLORIDE 600; 310; 30; 20 MG/100ML; MG/100ML; MG/100ML; MG/100ML
INJECTION, SOLUTION INTRAVENOUS
Status: DISCONTINUED | OUTPATIENT
Start: 2017-07-01 | End: 2017-07-01 | Stop reason: HOSPADM

## 2017-07-01 RX ORDER — CEFAZOLIN SODIUM 1 G/3ML
INJECTION, POWDER, FOR SOLUTION INTRAMUSCULAR; INTRAVENOUS AS NEEDED
Status: DISCONTINUED | OUTPATIENT
Start: 2017-07-01 | End: 2017-07-01 | Stop reason: HOSPADM

## 2017-07-01 RX ORDER — ROCURONIUM BROMIDE 10 MG/ML
INJECTION, SOLUTION INTRAVENOUS AS NEEDED
Status: DISCONTINUED | OUTPATIENT
Start: 2017-07-01 | End: 2017-07-01 | Stop reason: HOSPADM

## 2017-07-01 RX ORDER — SODIUM CHLORIDE 0.9 % (FLUSH) 0.9 %
5-10 SYRINGE (ML) INJECTION AS NEEDED
Status: DISCONTINUED | OUTPATIENT
Start: 2017-07-01 | End: 2017-07-12 | Stop reason: HOSPADM

## 2017-07-01 RX ORDER — SODIUM CHLORIDE 9 MG/ML
250 INJECTION, SOLUTION INTRAVENOUS AS NEEDED
Status: DISCONTINUED | OUTPATIENT
Start: 2017-07-01 | End: 2017-07-06

## 2017-07-01 RX ADMIN — SODIUM CHLORIDE: 9 INJECTION, SOLUTION INTRAVENOUS at 03:24

## 2017-07-01 RX ADMIN — GABAPENTIN 100 MG: 100 CAPSULE ORAL at 17:16

## 2017-07-01 RX ADMIN — DULOXETINE HYDROCHLORIDE 60 MG: 60 CAPSULE, DELAYED RELEASE ORAL at 08:12

## 2017-07-01 RX ADMIN — OXYCODONE AND ACETAMINOPHEN 1 TABLET: 5; 325 TABLET ORAL at 13:34

## 2017-07-01 RX ADMIN — Medication 10 ML: at 20:36

## 2017-07-01 RX ADMIN — CLOPIDOGREL 75 MG: 75 TABLET, FILM COATED ORAL at 16:35

## 2017-07-01 RX ADMIN — Medication 10 ML: at 05:50

## 2017-07-01 RX ADMIN — Medication 5 MG: at 04:26

## 2017-07-01 RX ADMIN — HEPARIN SODIUM 5000 UNITS: 5000 INJECTION, SOLUTION INTRAVENOUS; SUBCUTANEOUS at 05:48

## 2017-07-01 RX ADMIN — ONDANSETRON 4 MG: 2 SOLUTION INTRAMUSCULAR; INTRAVENOUS at 20:05

## 2017-07-01 RX ADMIN — DILTIAZEM HYDROCHLORIDE 30 MG: 30 TABLET, FILM COATED ORAL at 23:35

## 2017-07-01 RX ADMIN — DIPHENHYDRAMINE HYDROCHLORIDE 12.5 MG: 50 INJECTION, SOLUTION INTRAMUSCULAR; INTRAVENOUS at 22:27

## 2017-07-01 RX ADMIN — CEFAZOLIN 1 G: 1 INJECTION, POWDER, FOR SOLUTION INTRAMUSCULAR; INTRAVENOUS at 12:05

## 2017-07-01 RX ADMIN — AMIODARONE HYDROCHLORIDE 200 MG: 200 TABLET ORAL at 08:12

## 2017-07-01 RX ADMIN — CYANOCOBALAMIN TAB 1000 MCG 1000 MCG: 1000 TAB at 08:12

## 2017-07-01 RX ADMIN — VITAMIN D, TAB 1000IU (100/BT) 1000 UNITS: 25 TAB at 08:12

## 2017-07-01 RX ADMIN — DILTIAZEM HYDROCHLORIDE 30 MG: 30 TABLET, FILM COATED ORAL at 16:35

## 2017-07-01 RX ADMIN — Medication 220 MG: at 08:34

## 2017-07-01 RX ADMIN — FENTANYL CITRATE 100 MCG: 50 INJECTION, SOLUTION INTRAMUSCULAR; INTRAVENOUS at 14:26

## 2017-07-01 RX ADMIN — DILTIAZEM HYDROCHLORIDE 30 MG: 30 TABLET, FILM COATED ORAL at 08:12

## 2017-07-01 RX ADMIN — SUCCINYLCHOLINE CHLORIDE 100 MG: 20 INJECTION INTRAMUSCULAR; INTRAVENOUS at 14:30

## 2017-07-01 RX ADMIN — Medication 10 MG: at 03:48

## 2017-07-01 RX ADMIN — OXYCODONE AND ACETAMINOPHEN 1 TABLET: 5; 325 TABLET ORAL at 20:34

## 2017-07-01 RX ADMIN — HEPARIN SODIUM 5000 UNITS: 5000 INJECTION, SOLUTION INTRAVENOUS; SUBCUTANEOUS at 12:05

## 2017-07-01 RX ADMIN — PROPOFOL 30 MG: 10 INJECTION, EMULSION INTRAVENOUS at 04:41

## 2017-07-01 RX ADMIN — ATORVASTATIN CALCIUM 40 MG: 40 TABLET, FILM COATED ORAL at 20:06

## 2017-07-01 RX ADMIN — SODIUM CHLORIDE: 9 INJECTION, SOLUTION INTRAVENOUS at 03:44

## 2017-07-01 RX ADMIN — SODIUM CHLORIDE, SODIUM LACTATE, POTASSIUM CHLORIDE, AND CALCIUM CHLORIDE 75 ML/HR: 600; 310; 30; 20 INJECTION, SOLUTION INTRAVENOUS at 16:00

## 2017-07-01 RX ADMIN — GABAPENTIN 100 MG: 100 CAPSULE ORAL at 08:12

## 2017-07-01 RX ADMIN — ACETAMINOPHEN 650 MG: 325 TABLET, FILM COATED ORAL at 16:35

## 2017-07-01 RX ADMIN — CEFAZOLIN 1 G: 1 INJECTION, POWDER, FOR SOLUTION INTRAMUSCULAR; INTRAVENOUS at 20:06

## 2017-07-01 RX ADMIN — FENTANYL CITRATE 50 MCG: 50 INJECTION, SOLUTION INTRAMUSCULAR; INTRAVENOUS at 03:54

## 2017-07-01 RX ADMIN — DOCUSATE SODIUM 100 MG: 100 CAPSULE, LIQUID FILLED ORAL at 08:12

## 2017-07-01 RX ADMIN — Medication 10 MG: at 03:30

## 2017-07-01 RX ADMIN — SODIUM CHLORIDE, SODIUM LACTATE, POTASSIUM CHLORIDE, CALCIUM CHLORIDE: 600; 310; 30; 20 INJECTION, SOLUTION INTRAVENOUS at 03:24

## 2017-07-01 RX ADMIN — Medication 10 MG: at 03:54

## 2017-07-01 RX ADMIN — LOSARTAN POTASSIUM 25 MG: 25 TABLET, FILM COATED ORAL at 08:12

## 2017-07-01 RX ADMIN — FENTANYL CITRATE 50 MCG: 50 INJECTION, SOLUTION INTRAMUSCULAR; INTRAVENOUS at 14:52

## 2017-07-01 RX ADMIN — ROCURONIUM BROMIDE 30 MG: 10 INJECTION, SOLUTION INTRAVENOUS at 04:02

## 2017-07-01 RX ADMIN — FENTANYL CITRATE 100 MCG: 50 INJECTION, SOLUTION INTRAMUSCULAR; INTRAVENOUS at 03:25

## 2017-07-01 RX ADMIN — DOCUSATE SODIUM 100 MG: 100 CAPSULE, LIQUID FILLED ORAL at 17:16

## 2017-07-01 RX ADMIN — LIDOCAINE HYDROCHLORIDE 20 MG: 20 INJECTION, SOLUTION EPIDURAL; INFILTRATION; INTRACAUDAL; PERINEURAL at 14:30

## 2017-07-01 RX ADMIN — FENTANYL CITRATE 50 MCG: 50 INJECTION, SOLUTION INTRAMUSCULAR; INTRAVENOUS at 14:55

## 2017-07-01 RX ADMIN — Medication 2 MG: at 16:35

## 2017-07-01 RX ADMIN — METOPROLOL TARTRATE 12.5 MG: 25 TABLET ORAL at 20:35

## 2017-07-01 RX ADMIN — HEPARIN SODIUM 5000 UNITS: 5000 INJECTION, SOLUTION INTRAVENOUS; SUBCUTANEOUS at 20:36

## 2017-07-01 RX ADMIN — CEFAZOLIN 1 G: 1 INJECTION, POWDER, FOR SOLUTION INTRAMUSCULAR; INTRAVENOUS at 05:47

## 2017-07-01 RX ADMIN — PROPOFOL 100 MG: 10 INJECTION, EMULSION INTRAVENOUS at 14:30

## 2017-07-01 RX ADMIN — AMIODARONE HYDROCHLORIDE 200 MG: 200 TABLET ORAL at 17:16

## 2017-07-01 RX ADMIN — FERROUS SULFATE TAB 325 MG (65 MG ELEMENTAL FE) 325 MG: 325 (65 FE) TAB at 08:12

## 2017-07-01 RX ADMIN — Medication 10 MG: at 03:52

## 2017-07-01 RX ADMIN — DILTIAZEM HYDROCHLORIDE 30 MG: 30 TABLET, FILM COATED ORAL at 12:05

## 2017-07-01 RX ADMIN — ASPIRIN 81 MG CHEWABLE TABLET 81 MG: 81 TABLET CHEWABLE at 08:12

## 2017-07-01 RX ADMIN — PROPOFOL 30 MG: 10 INJECTION, EMULSION INTRAVENOUS at 04:36

## 2017-07-01 RX ADMIN — Medication 2 MG: at 20:05

## 2017-07-01 RX ADMIN — PROPOFOL 20 MG: 10 INJECTION, EMULSION INTRAVENOUS at 03:36

## 2017-07-01 RX ADMIN — Medication 10 MG: at 04:03

## 2017-07-01 RX ADMIN — CEFAZOLIN SODIUM 1 G: 1 INJECTION, POWDER, FOR SOLUTION INTRAMUSCULAR; INTRAVENOUS at 14:26

## 2017-07-01 RX ADMIN — Medication 250 MG: at 08:12

## 2017-07-01 RX ADMIN — Medication 2 MG: at 22:27

## 2017-07-01 RX ADMIN — METOPROLOL TARTRATE 12.5 MG: 25 TABLET ORAL at 08:12

## 2017-07-01 NOTE — BRIEF OP NOTE
BRIEF OPERATIVE NOTE    Date of Procedure: 7/1/2017   Preoperative Diagnosis: bleeding groin DX  Postoperative Diagnosis: bleeding groin DX    Procedure(s):  Reexploration of the Groin   Surgeon(s) and Role:     * Pj Tse MD - Primary         Assistant Staff:       Surgical Staff:  Circ-1: Maribel Calle RN  Scrub Tech-1: Humaira Reed  Surg Asst-1: Will Vogt  Event Time In   Incision Start 1440   Incision Close 1518     Anesthesia: General   Estimated Blood Loss: 50mL  Specimens: * No specimens in log *   Findings: diffuse ooze   Complications: none  Implants:   Implant Name Type Inv.  Item Serial No.  Lot No. LRB No. Used Action   KIT TISSEEL READY TO USE 10ML -- [employer] PRODUCT MIN ORDER 6EA - [id number]   KIT TISSEEL READY TO USE 10ML -- [employer] PRODUCT MIN ORDER 6EA [id number] [place] [employer] [id number] 1 Implanted

## 2017-07-01 NOTE — ANESTHESIA POSTPROCEDURE EVALUATION
Post-Anesthesia Evaluation and Assessment    Patient: Mari Mata MRN: [id number]  SSN: [id number]    YOB: 1960  Age: 59 y.o. Sex: male       Cardiovascular Function/Vital Signs  Visit Vitals    /56    Pulse 74    Temp 36.8 °C (98.2 °F)    Resp 14    Ht 5' 10\" (1.778 m)    Wt 74.7 kg (164 lb 10.9 oz)    SpO2 99%    BMI 23.63 kg/m2       Patient is status post general anesthesia for Procedure(s):  Reexploration of the Groin . Nausea/Vomiting: None    Postoperative hydration reviewed and adequate. Pain:  Pain Scale 1: Numeric (0 - 10) (07/01/17 1211)  Pain Intensity 1: 0 (07/01/17 1211)   Managed    Neurological Status:   Neuro (WDL): Within Defined Limits (06/27/17 1122)   At baseline    Mental Status and Level of Consciousness: Alert and oriented     Pulmonary Status:   O2 Device: Room air (07/01/17 1523)   Adequate oxygenation and airway patent    Complications related to anesthesia: None    Post-anesthesia assessment completed.  No concerns    Signed By: Betty Ho CRNA     July 1, 2017

## 2017-07-01 NOTE — TELEPHONE ENCOUNTER
I was off site so did not receive original message and spoke with our nurse through the office to relay back to daughter    There had been possible discussion of d/c Friday. But pt was likely not going home on Friday due to home vac system not yet being delivered    At time I am now documenting this note, patient was actually taken back or OR early Saturday morning by Dr Brian Donald for bleeding from groin site.  Will ensure he has updated family at this point

## 2017-07-01 NOTE — INTERVAL H&P NOTE
H&P Update:  Marcella Palmer was seen and examined. History and physical has been reviewed. Significant clinical changes have occurred as noted:  Left groin blow out. Need for urgent takeback to OR. Further plan pending operative findings.     Signed By: Mauro Vaz MD     July 1, 2017 2:17 AM

## 2017-07-01 NOTE — ANESTHESIA POSTPROCEDURE EVALUATION
Post-Anesthesia Evaluation and Assessment    Patient: Meredith Knight MRN: [id number]  SSN: [id number]    YOB: 1960  Age: 59 y.o. Sex: male       Cardiovascular Function/Vital Signs  Visit Vitals    BP 98/71    Pulse 67    Temp 37.1 °C (98.7 °F)    Resp 21    Ht 5' 10\" (1.778 m)    Wt 74.7 kg (164 lb 10.9 oz)    SpO2 100%    BMI 23.63 kg/m2       Patient is status post general anesthesia for Procedure(s):  Left Groin exploration possible ligation of bypass and arteries possible limb amputation. Nausea/Vomiting: None    Postoperative hydration reviewed and adequate. Pain:  Pain Scale 1: Numeric (0 - 10) (07/01/17 1211)  Pain Intensity 1: 0 (07/01/17 1211)   Managed    Neurological Status:   Neuro (WDL): Within Defined Limits (06/27/17 1122)   At baseline    Mental Status and Level of Consciousness: Arousable    Pulmonary Status:   O2 Device: Room air (07/01/17 0800)   Adequate oxygenation and airway patent    Complications related to anesthesia: None    Post-anesthesia assessment completed.  No concerns    Signed By: Myron Easton CRNA     July 1, 2017

## 2017-07-01 NOTE — PROGRESS NOTES
0155: around 1250 the pt called and stated that he was bleeding from the groin. Nursing ko and Dr. Miley Garcia were notified. Dr. Miley Garcia stated that I apply pressure as he call OR for the pt. .Pressure has been applied with 4 by 4 and a couple of ABD pads. Current vital; signs are /76 , pulse 58, resp 18 , O2 sat 99 and Temp. 98.1 Pt calm, no distress and not symptomatic. Will continue to monitor the pt until further step of care.

## 2017-07-01 NOTE — ANESTHESIA PREPROCEDURE EVALUATION
Anesthetic History   No history of anesthetic complications            Review of Systems / Medical History  Patient summary reviewed and pertinent labs reviewed    Pulmonary  Within defined limits                 Neuro/Psych   Within defined limits           Cardiovascular  Within defined limits  Hypertension        Dysrhythmias : atrial fibrillation  CAD and PAD    Exercise tolerance: <4 METS     GI/Hepatic/Renal  Within defined limits              Endo/Other  Within defined limits    Hypothyroidism  Arthritis     Other Findings   Comments:   Risk Factors for Postoperative nausea/vomiting:       History of postoperative nausea/vomiting? NO       Female? NO       Motion sickness? NO       Intended opioid administration for postoperative analgesia? YES      Smoking Abstinence  Current Smoker? NO  Elective Surgery? NO  Seen preoperatively by anesthesiologist or proxy prior to day of surgery? YES  Pt abstained from smoking 24 hours prior to anesthesia?  YES               Physical Exam    Airway  Mallampati: III  TM Distance: 4 - 6 cm  Neck ROM: normal range of motion   Mouth opening: Normal     Cardiovascular    Rhythm: regular  Rate: normal         Dental    Dentition: Edentulous     Pulmonary  Breath sounds clear to auscultation               Abdominal  GI exam deferred       Other Findings            Anesthetic Plan    ASA: 4, emergent  Anesthesia type: general          Induction: Inhalational  Anesthetic plan and risks discussed with: Patient

## 2017-07-01 NOTE — PROGRESS NOTES
Patient found to have blow out of psuedoaneurysm left groin. Active bleeding need for immediate take back to OR.

## 2017-07-01 NOTE — PROGRESS NOTES
To O.R. Now. Daughter made aware. 1541 Pt back from O.R, lethargic, follows simple command. L groin wound vac in place, continuous suction at 125mmHg, site soft, pedal pulses palpable. VSS on monitor.

## 2017-07-01 NOTE — ANESTHESIA PREPROCEDURE EVALUATION
Anesthetic History               Review of Systems / Medical History  Patient summary reviewed and pertinent labs reviewed    Pulmonary                   Neuro/Psych              Cardiovascular                  Exercise tolerance: <4 METS     GI/Hepatic/Renal                Endo/Other             Other Findings              Physical Exam    Airway  Mallampati: II  TM Distance: 4 - 6 cm  Neck ROM: normal range of motion   Mouth opening: Normal     Cardiovascular    Rhythm: regular  Rate: normal         Dental    Dentition: Edentulous     Pulmonary  Breath sounds clear to auscultation               Abdominal  GI exam deferred       Other Findings            Anesthetic Plan    ASA: 3, emergent  Anesthesia type: general          Induction: Intravenous  Anesthetic plan and risks discussed with: Patient

## 2017-07-01 NOTE — INTERVAL H&P NOTE
H&P Update:  Iesha Arenas was seen and examined. History and physical has been reviewed. Significant clinical changes have occurred as noted:  Bleeding again from left groin site. Was found in pool of blood no active squirting but has bright red blood flow welling from groin. Needs reexploration.     Signed By: Angélica Neal MD     July 1, 2017 1:47 PM

## 2017-07-01 NOTE — PROGRESS NOTES
Patient is not available to be assessed at this time.       [address] [facility].   [phone]

## 2017-07-01 NOTE — BRIEF OP NOTE
BRIEF OPERATIVE NOTE    Date of Procedure: 7/1/2017   Preoperative Diagnosis: pseudoaneurysm  Postoperative Diagnosis: pseudoaneurysm    Procedure(s):  Left Groin exploration repair of bleeding graft  Surgeon(s) and Role:     * Brad Mike MD - Primary         Assistant Staff:       Surgical Staff:  Circ-1: Tyra Leonard RN  Scrub RN-1: Albina Garrido RN  Surg Asst-1: Jeremy Andrews  Event Time In   Incision Start 0789   Incision Close 0435     Anesthesia: General   Estimated Blood Loss: 100mL  Specimens: * No specimens in log *   Findings: bleed from previous repair site. Appears an area was continued to have bleeding. Diffuse ooze from all scar area.   No massive bleeding identified   Complications: none  Implants: * No implants in log *

## 2017-07-02 LAB
ABO + RH BLD: NORMAL
BLD PROD TYP BPU: NORMAL
BLOOD GROUP ANTIBODIES SERPL: NORMAL
BPU ID: NORMAL
CALLED TO:,BCALL1: NORMAL
CROSSMATCH RESULT,%XM: NORMAL
SPECIMEN EXP DATE BLD: NORMAL
STATUS OF UNIT,%ST: NORMAL
UNIT DIVISION, %UDIV: 0

## 2017-07-02 PROCEDURE — 74011000258 HC RX REV CODE- 258: Performed by: SURGERY

## 2017-07-02 PROCEDURE — 74011250637 HC RX REV CODE- 250/637: Performed by: SURGERY

## 2017-07-02 PROCEDURE — 65660000004 HC RM CVT STEPDOWN

## 2017-07-02 PROCEDURE — 74011250636 HC RX REV CODE- 250/636: Performed by: SURGERY

## 2017-07-02 RX ADMIN — DILTIAZEM HYDROCHLORIDE 30 MG: 30 TABLET, FILM COATED ORAL at 20:22

## 2017-07-02 RX ADMIN — DOCUSATE SODIUM 100 MG: 100 CAPSULE, LIQUID FILLED ORAL at 17:00

## 2017-07-02 RX ADMIN — HEPARIN SODIUM 5000 UNITS: 5000 INJECTION, SOLUTION INTRAVENOUS; SUBCUTANEOUS at 12:25

## 2017-07-02 RX ADMIN — VITAMIN D, TAB 1000IU (100/BT) 1000 UNITS: 25 TAB at 08:12

## 2017-07-02 RX ADMIN — DILTIAZEM HYDROCHLORIDE 30 MG: 30 TABLET, FILM COATED ORAL at 16:54

## 2017-07-02 RX ADMIN — OXYCODONE AND ACETAMINOPHEN 1 TABLET: 5; 325 TABLET ORAL at 17:10

## 2017-07-02 RX ADMIN — HEPARIN SODIUM 5000 UNITS: 5000 INJECTION, SOLUTION INTRAVENOUS; SUBCUTANEOUS at 20:23

## 2017-07-02 RX ADMIN — DILTIAZEM HYDROCHLORIDE 30 MG: 30 TABLET, FILM COATED ORAL at 08:07

## 2017-07-02 RX ADMIN — CEFAZOLIN 1 G: 1 INJECTION, POWDER, FOR SOLUTION INTRAMUSCULAR; INTRAVENOUS at 20:32

## 2017-07-02 RX ADMIN — ATORVASTATIN CALCIUM 40 MG: 40 TABLET, FILM COATED ORAL at 20:22

## 2017-07-02 RX ADMIN — OXYCODONE AND ACETAMINOPHEN 1 TABLET: 5; 325 TABLET ORAL at 20:22

## 2017-07-02 RX ADMIN — Medication 10 ML: at 20:25

## 2017-07-02 RX ADMIN — GABAPENTIN 100 MG: 100 CAPSULE ORAL at 17:01

## 2017-07-02 RX ADMIN — AMIODARONE HYDROCHLORIDE 200 MG: 200 TABLET ORAL at 17:01

## 2017-07-02 RX ADMIN — FERROUS SULFATE TAB 325 MG (65 MG ELEMENTAL FE) 325 MG: 325 (65 FE) TAB at 08:07

## 2017-07-02 RX ADMIN — Medication 250 MG: at 08:07

## 2017-07-02 RX ADMIN — DOCUSATE SODIUM 100 MG: 100 CAPSULE, LIQUID FILLED ORAL at 08:07

## 2017-07-02 RX ADMIN — METOPROLOL TARTRATE 12.5 MG: 25 TABLET ORAL at 20:23

## 2017-07-02 RX ADMIN — DILTIAZEM HYDROCHLORIDE 30 MG: 30 TABLET, FILM COATED ORAL at 12:25

## 2017-07-02 RX ADMIN — CYANOCOBALAMIN TAB 1000 MCG 1000 MCG: 1000 TAB at 08:12

## 2017-07-02 RX ADMIN — OXYCODONE AND ACETAMINOPHEN 1 TABLET: 5; 325 TABLET ORAL at 05:48

## 2017-07-02 RX ADMIN — METOPROLOL TARTRATE 12.5 MG: 25 TABLET ORAL at 08:07

## 2017-07-02 RX ADMIN — GABAPENTIN 100 MG: 100 CAPSULE ORAL at 08:07

## 2017-07-02 RX ADMIN — DULOXETINE HYDROCHLORIDE 60 MG: 60 CAPSULE, DELAYED RELEASE ORAL at 08:07

## 2017-07-02 RX ADMIN — ACETAMINOPHEN 650 MG: 325 TABLET, FILM COATED ORAL at 08:12

## 2017-07-02 RX ADMIN — CLOPIDOGREL 75 MG: 75 TABLET, FILM COATED ORAL at 16:57

## 2017-07-02 RX ADMIN — AMIODARONE HYDROCHLORIDE 200 MG: 200 TABLET ORAL at 08:12

## 2017-07-02 RX ADMIN — LOSARTAN POTASSIUM 25 MG: 25 TABLET, FILM COATED ORAL at 08:07

## 2017-07-02 RX ADMIN — Medication 220 MG: at 08:15

## 2017-07-02 RX ADMIN — Medication 10 ML: at 05:38

## 2017-07-02 RX ADMIN — HEPARIN SODIUM 5000 UNITS: 5000 INJECTION, SOLUTION INTRAVENOUS; SUBCUTANEOUS at 05:38

## 2017-07-02 RX ADMIN — ASPIRIN 81 MG CHEWABLE TABLET 81 MG: 81 TABLET CHEWABLE at 08:07

## 2017-07-02 RX ADMIN — Medication 10 ML: at 15:57

## 2017-07-02 RX ADMIN — CEFAZOLIN 1 G: 1 INJECTION, POWDER, FOR SOLUTION INTRAMUSCULAR; INTRAVENOUS at 12:25

## 2017-07-02 RX ADMIN — CEFAZOLIN 1 G: 1 INJECTION, POWDER, FOR SOLUTION INTRAMUSCULAR; INTRAVENOUS at 05:37

## 2017-07-02 NOTE — ROUTINE PROCESS
1915 Verbal bedside report received. Pt in bed watching television / daughter at bedside. Pt in NAD, but requesting \"something for pain as soon as I can get it\". Call bell in reach. Side rails up x3 bed low and locked. See doc flow sheet for v/s/assessment. 2200 wound vac dressing to left groin c/d/i/ no bleeding/ no hematoma. 0000 wound vac dressing to left groin c/d/i. No bleeding/ no hematoma. See doc flow sheet for v/s/assessment.

## 2017-07-02 NOTE — PROGRESS NOTES
Patient is sleeping. Groin site is clear vac in place no issues currently. Would not change vac until Wednesday.

## 2017-07-03 PROCEDURE — 74011000258 HC RX REV CODE- 258: Performed by: SURGERY

## 2017-07-03 PROCEDURE — 77030011256 HC DRSG MEPILEX <16IN NO BORD MOLN -A

## 2017-07-03 PROCEDURE — 65660000004 HC RM CVT STEPDOWN

## 2017-07-03 PROCEDURE — 74011250637 HC RX REV CODE- 250/637: Performed by: SURGERY

## 2017-07-03 PROCEDURE — 74011250636 HC RX REV CODE- 250/636: Performed by: SURGERY

## 2017-07-03 RX ADMIN — OXYCODONE AND ACETAMINOPHEN 1 TABLET: 5; 325 TABLET ORAL at 17:06

## 2017-07-03 RX ADMIN — HEPARIN SODIUM 5000 UNITS: 5000 INJECTION, SOLUTION INTRAVENOUS; SUBCUTANEOUS at 12:45

## 2017-07-03 RX ADMIN — METOPROLOL TARTRATE 12.5 MG: 25 TABLET ORAL at 21:18

## 2017-07-03 RX ADMIN — FERROUS SULFATE TAB 325 MG (65 MG ELEMENTAL FE) 325 MG: 325 (65 FE) TAB at 10:14

## 2017-07-03 RX ADMIN — OXYCODONE AND ACETAMINOPHEN 1 TABLET: 5; 325 TABLET ORAL at 10:14

## 2017-07-03 RX ADMIN — OXYCODONE AND ACETAMINOPHEN 1 TABLET: 5; 325 TABLET ORAL at 02:20

## 2017-07-03 RX ADMIN — CLOPIDOGREL 75 MG: 75 TABLET, FILM COATED ORAL at 17:06

## 2017-07-03 RX ADMIN — DOCUSATE SODIUM 100 MG: 100 CAPSULE, LIQUID FILLED ORAL at 10:14

## 2017-07-03 RX ADMIN — ATORVASTATIN CALCIUM 40 MG: 40 TABLET, FILM COATED ORAL at 21:19

## 2017-07-03 RX ADMIN — HEPARIN SODIUM 5000 UNITS: 5000 INJECTION, SOLUTION INTRAVENOUS; SUBCUTANEOUS at 06:25

## 2017-07-03 RX ADMIN — CEFAZOLIN 1 G: 1 INJECTION, POWDER, FOR SOLUTION INTRAMUSCULAR; INTRAVENOUS at 06:24

## 2017-07-03 RX ADMIN — LOSARTAN POTASSIUM 25 MG: 25 TABLET, FILM COATED ORAL at 10:14

## 2017-07-03 RX ADMIN — CEFAZOLIN 1 G: 1 INJECTION, POWDER, FOR SOLUTION INTRAMUSCULAR; INTRAVENOUS at 21:24

## 2017-07-03 RX ADMIN — DILTIAZEM HYDROCHLORIDE 30 MG: 30 TABLET, FILM COATED ORAL at 10:14

## 2017-07-03 RX ADMIN — AMIODARONE HYDROCHLORIDE 200 MG: 200 TABLET ORAL at 10:14

## 2017-07-03 RX ADMIN — CEFAZOLIN 1 G: 1 INJECTION, POWDER, FOR SOLUTION INTRAMUSCULAR; INTRAVENOUS at 12:45

## 2017-07-03 RX ADMIN — Medication 220 MG: at 10:29

## 2017-07-03 RX ADMIN — VITAMIN D, TAB 1000IU (100/BT) 1000 UNITS: 25 TAB at 10:14

## 2017-07-03 RX ADMIN — CYANOCOBALAMIN TAB 1000 MCG 1000 MCG: 1000 TAB at 10:14

## 2017-07-03 RX ADMIN — AMIODARONE HYDROCHLORIDE 200 MG: 200 TABLET ORAL at 18:53

## 2017-07-03 RX ADMIN — ASPIRIN 81 MG CHEWABLE TABLET 81 MG: 81 TABLET CHEWABLE at 10:14

## 2017-07-03 RX ADMIN — GABAPENTIN 100 MG: 100 CAPSULE ORAL at 10:14

## 2017-07-03 RX ADMIN — DOCUSATE SODIUM 100 MG: 100 CAPSULE, LIQUID FILLED ORAL at 17:06

## 2017-07-03 RX ADMIN — DILTIAZEM HYDROCHLORIDE 30 MG: 30 TABLET, FILM COATED ORAL at 21:19

## 2017-07-03 RX ADMIN — DILTIAZEM HYDROCHLORIDE 30 MG: 30 TABLET, FILM COATED ORAL at 17:06

## 2017-07-03 RX ADMIN — OXYCODONE AND ACETAMINOPHEN 1 TABLET: 5; 325 TABLET ORAL at 21:20

## 2017-07-03 RX ADMIN — Medication 10 ML: at 06:25

## 2017-07-03 RX ADMIN — DULOXETINE HYDROCHLORIDE 60 MG: 60 CAPSULE, DELAYED RELEASE ORAL at 10:14

## 2017-07-03 RX ADMIN — GABAPENTIN 100 MG: 100 CAPSULE ORAL at 18:53

## 2017-07-03 RX ADMIN — Medication 250 MG: at 10:14

## 2017-07-03 RX ADMIN — METOPROLOL TARTRATE 12.5 MG: 25 TABLET ORAL at 10:14

## 2017-07-03 RX ADMIN — Medication 10 ML: at 21:27

## 2017-07-03 RX ADMIN — HEPARIN SODIUM 5000 UNITS: 5000 INJECTION, SOLUTION INTRAVENOUS; SUBCUTANEOUS at 21:20

## 2017-07-03 NOTE — ROUTINE PROCESS
Bedside and Verbal shift change report given to Kelsey Luo (oncoming nurse) by Tello Barrera RN (offgoing nurse). Report included the following information SBAR, Kardex, Recent Results and Cardiac Rhythm NSR.

## 2017-07-03 NOTE — PROGRESS NOTES
Pt awake and in good spirits  No complaints  Vac to left groin with good seal/suction  As per Dr Peyton Mariano notes yesterday, will not plan to change vac until Wednesday  Ok to be OOB to chair and do transfers

## 2017-07-03 NOTE — OP NOTES
[address]    Name:  Romayne Ping  MR#:  [id number]  :  1960  Account #:  [de-identified]  Date of Adm:  2017  Date of Surgery:  2017      ATTENDING SURGEON: Muriel Gold MD    PREOPERATIVE DIAGNOSIS:  Bleeding from left groin with previous  history of pseudoaneurysm. POSTOPERATIVE DIAGNOSIS:  Bleeding from graft interface to the  artery. CULTURES:  None. SPECIMENS REMOVED:  None. DRAINS:  None. ESTIMATED BLOOD LOSS:  100 mL. ANESTHESIA:  General.    PROCEDURES PERFORMED  1. Left groin exploration. 2. Left femoral repair pseudoaneurysm. 3. Left wound washout. 4. Wound VAC placement. TOTAL SIZE:  11 cm x 2.5 cm x 2 cm deep. INDICATIONS FOR THE PROCEDURE:  The patient is a 64-year-old  gentleman with a left common femoral pseudoaneurysm. The patient  was found to have bleeding from his left groin by nursing, reported as  spurting red blood. Immediate pressure was held and the patient ws  taken to the operating room for wound exploration. The patient was  given the risks and benefits of the procedure including but not limited  to bleeding, infection, damage to adjacent structures, MI, stroke and  death, as well as loss of lower extremity. He was understanding of all  the risks and was willing to move forward with the surgery. OPERATIVE FINDINGS:  Some marginal hematoma is identified. A lot  of it is old. Upon getting down to the graft artery interface, there is an  area where there is active arterial bleeding from the graft artery  interface where the previous repair was. This is easily repaired with a  4-0 Prolene suture. The patient does have diffuse ooze over his  entirely of scar and appears to have some ooze from the tract site  where the fem-fem is located, but no active spurting arterial blood flow  can be identified, just diffuse ooze.     DESCRIPTION OF PROCEDURE:  The patient was correctly identified  in his room and taken emergently to the operating room. The patient  had a pre-incision time-out prior to any incision. The patient was  prepped and draped in normal sterile fashion according to CDC  guidelines with aseptic technique. The patient was given preoperative  antibiotics prior to any incision. We then were able to open up the  previous incision, which was not difficult as the patient had a VAC that  was placed. We opened up the previous sutures within a deep, kind of,  scar tissue area with scissors, removed some old hematoma and  copiously irrigated the wound. There was no massive bleed that was  identified, but there is a small arterial spurt that was identified along the  lateral aspect of the graft atypical anastomosis. This was repaired with  a single 4-0 Prolene suture in a figure-of-eight fashion. Once that was  done, there was some, kind of, diffuse oozing. We did perform Bovie  electrocautery, opened up the wound a little bit further, and then used  Surgicel every time we were able to place good pressure and we were  able to get it to stop bleeding, but once we started irrigating and  agitating it would slowly ooze again, so we went ahead and put some  thrombin gel foam around the anastomosis, as well as the more  problem areas of diffuse ooze; held pressure for about 5-10 minutes,  had good hemostasis and then closed the scar tissue over top the graft  with a 2-0 Monocryl interrupted suture. At those suture sites, he  continued to ooze so we did use some Bovie electrocautery to try and  stop that. Then we measured the wound to be 11 cm x 2.5 cm x 2 cm  and placed the wound VAC in place. The patient will be taken to the  ICU in stable condition. He did have a good pulse within the graft.         MD ALFONSO Alarcon / Luis Moss  D:  07/01/2017   04:50  T:  07/03/2017   05:40  Job #:  [id number]

## 2017-07-03 NOTE — PROGRESS NOTES
NUTRITION    Nutrition Screen     RECOMMENDATIONS / PLAN:     - Start Ensure BID (breakfast and dinner)   - Discontinue zinc sulfate.   - Continue RD inpatient monitoring and evaluation. NUTRITION INTERVENTIONS & DIAGNOSIS:     [x] Meals/Snacks: modified diet  [x] Medical food supplementation: Ensure BID    [x] Vitamin and mineral supplementation: zinc sulfate  [x] Coordination of care: discussed zinc supplementation with JUNE Bey    Nutrition Diagnosis:   1. Unintentional weight loss related to inadequate energy intake as evidenced by patient reported weight loss of 5% in the past few weeks, patient reports pants are looser and belt is on the last notch. 2. Increased nutrient needs (energy/protein) related wound healing and weight loss as evidenced by pt with multiple surgical wounds and significant weight loss of 5% x past few weeks. 3. Predicted excessive nutrient intake (Zn) related to zinc supplementation as evidenced by pt receiving zinc sulfate since 6/2 (1 month). ASSESSMENT:     Tolerating diet. Discussed need to start supplements-patient agreeable, states he takes Ensure at home, prefers chocolate. Average po intake adequate to meet patients estimated nutritional needs:   [] Yes     [x] No   [] Unable to determine at this time    Diet: DIET CARDIAC Regular      Food Allergies:NKFA   Current Appetite:   [x] Good, patient reports breakfast is best meal of the day. [] Fair     [] Poor     [] Other:  Appetite/meal intake prior to admission:   [] Good     [x] Fair     [] Poor     [] Other:  Feeding Limitations:  [] Swallowing difficulty    [] Chewing difficulty    [] Other:  Patient reports if he has issues chewing will put in teeth. Current Meal Intake: Patient Vitals for the past 100 hrs:   % Diet Eaten   07/03/17 1000 100 %   07/02/17 1742 100 %   07/02/17 1218 100 %   07/02/17 0823 100 %   07/01/17 1246 100 %   07/01/17 0958 50 %   06/29/17 0800 75 %       BM:  6/27.  Patient states he feels the urge to go today  Skin Integrity: Left groin, R chest wall abscess, vascular wounds R ankle, R heel. Edema: LLE 1+, RLE 1+  Pertinent Medications: Reviewed. Noted zinc since 6/2/17. No results for input(s): NA, K, CL, CO2, GLU, BUN, CREA, CA, MG, PHOS, ALB, PREALB, TBIL, SGOT, ALT in the last 72 hours. Intake/Output Summary (Last 24 hours) at 07/03/17 1123  Last data filed at 07/03/17 1111   Gross per 24 hour   Intake             1970 ml   Output             2904 ml   Net             -934 ml       Anthropometrics:  Ht Readings from Last 1 Encounters:   06/23/17 5' 10\" (1.778 m)     Last 3 Recorded Weights in this Encounter    07/01/17 0506 07/02/17 0432 07/03/17 0625   Weight: 74.7 kg (164 lb 10.9 oz) 74.1 kg (163 lb 5.8 oz) 71.3 kg (157 lb 3 oz)     Body mass index is 22.55 kg/(m^2). Weight History: Patient reports weight of 165# a few weeks ago. -8#, 5%  In <1 month. Weight Metrics 7/3/2017 6/27/2017 6/22/2017 6/19/2017 6/15/2017 6/7/2017 6/2/2017   Weight 157 lb 3 oz - 165 lb 160 lb 160 lb 160 lb 160 lb   BMI - 22.55 kg/m2 23.68 kg/m2 22.96 kg/m2 22.96 kg/m2 22.96 kg/m2 22.96 kg/m2        Admitting Diagnosis: Ruptured abdominal aortic aneurysm (AAA) ([facility]) [I71.3]   Left groin bleed s/p exploration and washout. R chest wall abscess    Pertinent PMHx: history of vascular surgery, chronic ulcer of R foot, dyslipidemia, peripheral neuropathy, peripheral artery disease, afib, benign heart disease with systolic congestive heart failure.      Education Needs:        [x] None identified  [] Identified - Not appropriate at this time  []  Identified and addressed - refer to education log  Learning Limitations:   [x] None identified  [] Identified    Cultural, Moravian & ethnic food preferences:  [x] None identified    [] Identified and addressed     ESTIMATED NUTRITION NEEDS:     Calories: 2296-1487 kcal (35-40 kcal/kg) based on  [x] Actual BW      [] IBW   Protein: 110-145 gm (1.5-2.0 gm/kg) based on  [] Actual BW      [] IBW   Fluid: 1 mL/kcal     MONITORING & EVALUATION:     Nutrition Goal(s):   1. Po intake of meals will meet >75% of patient estimated nutritional needs within the next 7 days. Outcome:  [] Met/Ongoing    []  Not Met    [x] New/Initial Goal     Monitoring:   [x] Diet tolerance   [x] Meal intake   [x] Supplement intake   [] GI symptoms/ability to tolerate po diet   [] Respiratory status   [] Plan of care      Discharge Planning: Continue cardiac diet. Continue ensure/supplement consumption when appetite is variable.     [x] Participated in care planning, discharge planning, & interdisciplinary rounds as appropriate    ASHLEY Mims, [address]   Pager: [phone]

## 2017-07-03 NOTE — PROGRESS NOTES
met with patient, completed the initial Spiritual Assessment of the patient, and offered Pastoral Care, see flow sheets for interventions. Patient said he is doing all right. He said he hopes to be discharged Thursday. Pastoral support provided. Patient does not have any Sabianism/cultural needs that will affect patients preferences in health care. Chart reviewed. Chaplains will continue to follow and will provide pastoral care on an as needed/as requested basis. Abram Murray MDiv.   Board Certified [employer] [phone]

## 2017-07-03 NOTE — ROUTINE PROCESS
Bedside and Verbal shift change report given to Mariah Farah (oncoming nurse) by Alexander Cobos RN   (offgoing nurse). Report included the following information SBAR, Kardex, Intake/Output, MAR, Recent Results, Med Rec Status and Cardiac Rhythm NSR.

## 2017-07-04 ENCOUNTER — ANESTHESIA (OUTPATIENT)
Dept: CARDIOTHORACIC SURGERY | Age: 57
DRG: 253 | End: 2017-07-04
Payer: COMMERCIAL

## 2017-07-04 ENCOUNTER — ANESTHESIA EVENT (OUTPATIENT)
Dept: CARDIOTHORACIC SURGERY | Age: 57
DRG: 253 | End: 2017-07-04
Payer: COMMERCIAL

## 2017-07-04 LAB
ANION GAP BLD CALC-SCNC: 10 MMOL/L (ref 3–18)
ANION GAP BLD CALC-SCNC: 8 MMOL/L (ref 3–18)
APTT PPP: 34.4 SEC (ref 23–36.4)
BUN SERPL-MCNC: 7 MG/DL (ref 7–18)
BUN/CREAT SERPL: 11 (ref 12–20)
CALCIUM SERPL-MCNC: 8.7 MG/DL (ref 8.5–10.1)
CHLORIDE SERPL-SCNC: 100 MMOL/L (ref 100–108)
CHLORIDE SERPL-SCNC: 99 MMOL/L (ref 100–108)
CO2 SERPL-SCNC: 26 MMOL/L (ref 21–32)
CO2 SERPL-SCNC: 28 MMOL/L (ref 21–32)
CREAT SERPL-MCNC: 0.62 MG/DL (ref 0.6–1.3)
ERYTHROCYTE [DISTWIDTH] IN BLOOD BY AUTOMATED COUNT: 17.6 % (ref 11.6–14.5)
ERYTHROCYTE [DISTWIDTH] IN BLOOD BY AUTOMATED COUNT: 18.2 % (ref 11.6–14.5)
GLUCOSE SERPL-MCNC: 103 MG/DL (ref 74–99)
HCT VFR BLD AUTO: 23.7 % (ref 36–48)
HCT VFR BLD AUTO: 26.8 % (ref 36–48)
HGB BLD-MCNC: 7.9 G/DL (ref 13–16)
HGB BLD-MCNC: 9 G/DL (ref 13–16)
INR PPP: 1 (ref 0.8–1.2)
MCH RBC QN AUTO: 28 PG (ref 24–34)
MCH RBC QN AUTO: 28.3 PG (ref 24–34)
MCHC RBC AUTO-ENTMCNC: 33.3 G/DL (ref 31–37)
MCHC RBC AUTO-ENTMCNC: 33.6 G/DL (ref 31–37)
MCV RBC AUTO: 84 FL (ref 74–97)
MCV RBC AUTO: 84.3 FL (ref 74–97)
PLATELET # BLD AUTO: 405 K/UL (ref 135–420)
PLATELET # BLD AUTO: 442 K/UL (ref 135–420)
PMV BLD AUTO: 9 FL (ref 9.2–11.8)
PMV BLD AUTO: 9 FL (ref 9.2–11.8)
POTASSIUM SERPL-SCNC: 4.3 MMOL/L (ref 3.5–5.5)
POTASSIUM SERPL-SCNC: 4.4 MMOL/L (ref 3.5–5.5)
PROTHROMBIN TIME: 13 SEC (ref 11.5–15.2)
RBC # BLD AUTO: 2.82 M/UL (ref 4.7–5.5)
RBC # BLD AUTO: 3.18 M/UL (ref 4.7–5.5)
SODIUM SERPL-SCNC: 135 MMOL/L (ref 136–145)
SODIUM SERPL-SCNC: 136 MMOL/L (ref 136–145)
WBC # BLD AUTO: 13.4 K/UL (ref 4.6–13.2)
WBC # BLD AUTO: 15.9 K/UL (ref 4.6–13.2)

## 2017-07-04 PROCEDURE — 77030020061 HC IV BLD WRMR ADMIN SET 3M -B: Performed by: ANESTHESIOLOGY

## 2017-07-04 PROCEDURE — 85027 COMPLETE CBC AUTOMATED: CPT | Performed by: ANESTHESIOLOGY

## 2017-07-04 PROCEDURE — 77030002996 HC SUT SLK J&J -A: Performed by: SURGERY

## 2017-07-04 PROCEDURE — 76010000110 HC CV SURG 3 TO 3.5 HR: Performed by: SURGERY

## 2017-07-04 PROCEDURE — P9016 RBC LEUKOCYTES REDUCED: HCPCS | Performed by: ANESTHESIOLOGY

## 2017-07-04 PROCEDURE — 77030026918 HC ADMN ST IV BLD BD -A: Performed by: ANESTHESIOLOGY

## 2017-07-04 PROCEDURE — 74011250636 HC RX REV CODE- 250/636

## 2017-07-04 PROCEDURE — 36415 COLL VENOUS BLD VENIPUNCTURE: CPT | Performed by: ANESTHESIOLOGY

## 2017-07-04 PROCEDURE — 77030031139 HC SUT VCRL2 J&J -A: Performed by: SURGERY

## 2017-07-04 PROCEDURE — 74011250636 HC RX REV CODE- 250/636: Performed by: NURSE ANESTHETIST, CERTIFIED REGISTERED

## 2017-07-04 PROCEDURE — 77030018836 HC SOL IRR NACL ICUM -A: Performed by: SURGERY

## 2017-07-04 PROCEDURE — 77030008683 HC TU ET CUF COVD -A: Performed by: ANESTHESIOLOGY

## 2017-07-04 PROCEDURE — 86920 COMPATIBILITY TEST SPIN: CPT | Performed by: ANESTHESIOLOGY

## 2017-07-04 PROCEDURE — 77030013797 HC KT TRNSDUC PRSSR EDWD -A: Performed by: ANESTHESIOLOGY

## 2017-07-04 PROCEDURE — 74011250636 HC RX REV CODE- 250/636: Performed by: SURGERY

## 2017-07-04 PROCEDURE — 80051 ELECTROLYTE PANEL: CPT | Performed by: SURGERY

## 2017-07-04 PROCEDURE — 86900 BLOOD TYPING SEROLOGIC ABO: CPT | Performed by: ANESTHESIOLOGY

## 2017-07-04 PROCEDURE — 77030018673: Performed by: SURGERY

## 2017-07-04 PROCEDURE — 77030002986 HC SUT PROL J&J -A: Performed by: SURGERY

## 2017-07-04 PROCEDURE — 77030013079 HC BLNKT BAIR HGGR 3M -A: Performed by: ANESTHESIOLOGY

## 2017-07-04 PROCEDURE — 77030010505 HC ADH BIOGLU CRYO -F: Performed by: SURGERY

## 2017-07-04 PROCEDURE — 77030002924 HC SUT GORTX WLGO -B: Performed by: SURGERY

## 2017-07-04 PROCEDURE — 77030008467 HC STPLR SKN COVD -B: Performed by: SURGERY

## 2017-07-04 PROCEDURE — 77030005401 HC CATH RAD ARRO -A: Performed by: ANESTHESIOLOGY

## 2017-07-04 PROCEDURE — 74011000258 HC RX REV CODE- 258

## 2017-07-04 PROCEDURE — 041K0JJ BYPASS RIGHT FEMORAL ARTERY TO LEFT FEMORAL ARTERY WITH SYNTHETIC SUBSTITUTE, OPEN APPROACH: ICD-10-PCS | Performed by: SURGERY

## 2017-07-04 PROCEDURE — 85730 THROMBOPLASTIN TIME PARTIAL: CPT | Performed by: ANESTHESIOLOGY

## 2017-07-04 PROCEDURE — 77030033263 HC DRSG MEPILEX 16-48IN BORD MOLN -B

## 2017-07-04 PROCEDURE — 77030018316 HC SEAL FBRN TISSL 4ML BAXT -C: Performed by: SURGERY

## 2017-07-04 PROCEDURE — 77030011640 HC PAD GRND REM COVD -A: Performed by: SURGERY

## 2017-07-04 PROCEDURE — 74011000258 HC RX REV CODE- 258: Performed by: SURGERY

## 2017-07-04 PROCEDURE — 76060000037 HC ANESTHESIA 3 TO 3.5 HR: Performed by: SURGERY

## 2017-07-04 PROCEDURE — 65620000000 HC RM CCU GENERAL

## 2017-07-04 PROCEDURE — 77030018719 HC DRSG PTCH ANTIMIC J&J -A: Performed by: ANESTHESIOLOGY

## 2017-07-04 PROCEDURE — 77030006835 HC BLD SAW SAG STRY -B: Performed by: SURGERY

## 2017-07-04 PROCEDURE — 77030008500 HC TBNG CONN PRSS BD -A: Performed by: ANESTHESIOLOGY

## 2017-07-04 PROCEDURE — 77030020782 HC GWN BAIR PAWS FLX 3M -B: Performed by: SURGERY

## 2017-07-04 PROCEDURE — 77030019908 HC STETH ESOPH SIMS -A: Performed by: ANESTHESIOLOGY

## 2017-07-04 PROCEDURE — 80048 BASIC METABOLIC PNL TOTAL CA: CPT | Performed by: ANESTHESIOLOGY

## 2017-07-04 PROCEDURE — 04LK0CZ OCCLUSION OF RIGHT FEMORAL ARTERY WITH EXTRALUMINAL DEVICE, OPEN APPROACH: ICD-10-PCS | Performed by: SURGERY

## 2017-07-04 PROCEDURE — 0JCC0ZZ EXTIRPATION OF MATTER FROM PELVIC REGION SUBCUTANEOUS TISSUE AND FASCIA, OPEN APPROACH: ICD-10-PCS | Performed by: SURGERY

## 2017-07-04 PROCEDURE — 74011000250 HC RX REV CODE- 250

## 2017-07-04 PROCEDURE — 74011250637 HC RX REV CODE- 250/637: Performed by: SURGERY

## 2017-07-04 PROCEDURE — 85610 PROTHROMBIN TIME: CPT | Performed by: ANESTHESIOLOGY

## 2017-07-04 PROCEDURE — 77030026438 HC STYL ET INTUB CARD -A: Performed by: ANESTHESIOLOGY

## 2017-07-04 PROCEDURE — C1768 GRAFT, VASCULAR: HCPCS | Performed by: SURGERY

## 2017-07-04 PROCEDURE — 77030013533 HC SEAL FBRN TISSL RDYTUSE 10ML BAXT -E: Performed by: SURGERY

## 2017-07-04 DEVICE — SEALANT FIBRIN 10 CC FRZN PRE FILLED SYR TISSEEL: Type: IMPLANTABLE DEVICE | Site: GROIN | Status: FUNCTIONAL

## 2017-07-04 DEVICE — GRAFT VASC L50CM DIA8MM RNG L40CM EPTFE THN WALLED CBAS HEP: Type: IMPLANTABLE DEVICE | Site: GROIN | Status: FUNCTIONAL

## 2017-07-04 DEVICE — SEALANT KT FIBRIN HEMSTAT 4ML -- TISSEEL: Type: IMPLANTABLE DEVICE | Site: GROIN | Status: FUNCTIONAL

## 2017-07-04 RX ORDER — HEPARIN SODIUM 1000 [USP'U]/ML
INJECTION, SOLUTION INTRAVENOUS; SUBCUTANEOUS AS NEEDED
Status: DISCONTINUED | OUTPATIENT
Start: 2017-07-04 | End: 2017-07-04 | Stop reason: HOSPADM

## 2017-07-04 RX ORDER — NITROGLYCERIN 40 MG/100ML
INJECTION INTRAVENOUS AS NEEDED
Status: DISCONTINUED | OUTPATIENT
Start: 2017-07-04 | End: 2017-07-04 | Stop reason: HOSPADM

## 2017-07-04 RX ORDER — PROPOFOL 10 MG/ML
INJECTION, EMULSION INTRAVENOUS AS NEEDED
Status: DISCONTINUED | OUTPATIENT
Start: 2017-07-04 | End: 2017-07-04 | Stop reason: HOSPADM

## 2017-07-04 RX ORDER — HEPARIN SODIUM 200 [USP'U]/100ML
INJECTION, SOLUTION INTRAVENOUS
Status: DISPENSED
Start: 2017-07-04 | End: 2017-07-05

## 2017-07-04 RX ORDER — HYDROMORPHONE HYDROCHLORIDE 1 MG/ML
INJECTION, SOLUTION INTRAMUSCULAR; INTRAVENOUS; SUBCUTANEOUS AS NEEDED
Status: DISCONTINUED | OUTPATIENT
Start: 2017-07-04 | End: 2017-07-04 | Stop reason: HOSPADM

## 2017-07-04 RX ORDER — FENTANYL CITRATE 50 UG/ML
INJECTION, SOLUTION INTRAMUSCULAR; INTRAVENOUS AS NEEDED
Status: DISCONTINUED | OUTPATIENT
Start: 2017-07-04 | End: 2017-07-04 | Stop reason: HOSPADM

## 2017-07-04 RX ORDER — LABETALOL HYDROCHLORIDE 5 MG/ML
INJECTION, SOLUTION INTRAVENOUS AS NEEDED
Status: DISCONTINUED | OUTPATIENT
Start: 2017-07-04 | End: 2017-07-04 | Stop reason: HOSPADM

## 2017-07-04 RX ORDER — HEPARIN SODIUM 200 [USP'U]/100ML
INJECTION, SOLUTION INTRAVENOUS
Status: DISCONTINUED | OUTPATIENT
Start: 2017-07-04 | End: 2017-07-04 | Stop reason: HOSPADM

## 2017-07-04 RX ORDER — SODIUM CHLORIDE 9 MG/ML
INJECTION, SOLUTION INTRAVENOUS
Status: DISCONTINUED | OUTPATIENT
Start: 2017-07-04 | End: 2017-07-04 | Stop reason: HOSPADM

## 2017-07-04 RX ORDER — ONDANSETRON 2 MG/ML
INJECTION INTRAMUSCULAR; INTRAVENOUS AS NEEDED
Status: DISCONTINUED | OUTPATIENT
Start: 2017-07-04 | End: 2017-07-04 | Stop reason: HOSPADM

## 2017-07-04 RX ORDER — PROTAMINE SULFATE 10 MG/ML
INJECTION, SOLUTION INTRAVENOUS AS NEEDED
Status: DISCONTINUED | OUTPATIENT
Start: 2017-07-04 | End: 2017-07-04 | Stop reason: HOSPADM

## 2017-07-04 RX ORDER — SODIUM CHLORIDE 9 MG/ML
250 INJECTION, SOLUTION INTRAVENOUS AS NEEDED
Status: DISCONTINUED | OUTPATIENT
Start: 2017-07-04 | End: 2017-07-06

## 2017-07-04 RX ORDER — SUCCINYLCHOLINE CHLORIDE 20 MG/ML
INJECTION INTRAMUSCULAR; INTRAVENOUS AS NEEDED
Status: DISCONTINUED | OUTPATIENT
Start: 2017-07-04 | End: 2017-07-04 | Stop reason: HOSPADM

## 2017-07-04 RX ORDER — ROCURONIUM BROMIDE 10 MG/ML
INJECTION, SOLUTION INTRAVENOUS AS NEEDED
Status: DISCONTINUED | OUTPATIENT
Start: 2017-07-04 | End: 2017-07-04 | Stop reason: HOSPADM

## 2017-07-04 RX ORDER — HEPARIN SODIUM 200 [USP'U]/100ML
INJECTION, SOLUTION INTRAVENOUS
Status: DISPENSED
Start: 2017-07-04 | End: 2017-07-04

## 2017-07-04 RX ADMIN — DILTIAZEM HYDROCHLORIDE 30 MG: 30 TABLET, FILM COATED ORAL at 21:09

## 2017-07-04 RX ADMIN — ROCURONIUM BROMIDE 5 MG: 10 INJECTION, SOLUTION INTRAVENOUS at 11:58

## 2017-07-04 RX ADMIN — ATORVASTATIN CALCIUM 40 MG: 40 TABLET, FILM COATED ORAL at 21:09

## 2017-07-04 RX ADMIN — SODIUM CHLORIDE: 9 INJECTION, SOLUTION INTRAVENOUS at 12:04

## 2017-07-04 RX ADMIN — METOPROLOL TARTRATE 12.5 MG: 25 TABLET ORAL at 21:09

## 2017-07-04 RX ADMIN — CEFAZOLIN 1 G: 1 INJECTION, POWDER, FOR SOLUTION INTRAMUSCULAR; INTRAVENOUS at 05:15

## 2017-07-04 RX ADMIN — PROTAMINE SULFATE 10 MG: 10 INJECTION, SOLUTION INTRAVENOUS at 13:17

## 2017-07-04 RX ADMIN — NITROGLYCERIN 40 MCG: 40 INJECTION INTRAVENOUS at 14:45

## 2017-07-04 RX ADMIN — HEPARIN SODIUM 5000 UNITS: 5000 INJECTION, SOLUTION INTRAVENOUS; SUBCUTANEOUS at 05:15

## 2017-07-04 RX ADMIN — AMIODARONE HYDROCHLORIDE 200 MG: 200 TABLET ORAL at 18:57

## 2017-07-04 RX ADMIN — FENTANYL CITRATE 50 MCG: 50 INJECTION, SOLUTION INTRAMUSCULAR; INTRAVENOUS at 12:56

## 2017-07-04 RX ADMIN — HEPARIN SODIUM 5000 UNITS: 5000 INJECTION, SOLUTION INTRAVENOUS; SUBCUTANEOUS at 21:11

## 2017-07-04 RX ADMIN — HYDROMORPHONE HYDROCHLORIDE 0.5 MG: 1 INJECTION, SOLUTION INTRAMUSCULAR; INTRAVENOUS; SUBCUTANEOUS at 13:15

## 2017-07-04 RX ADMIN — HEPARIN SODIUM 5000 UNITS: 1000 INJECTION, SOLUTION INTRAVENOUS; SUBCUTANEOUS at 12:31

## 2017-07-04 RX ADMIN — Medication 10 ML: at 16:10

## 2017-07-04 RX ADMIN — FENTANYL CITRATE 50 MCG: 50 INJECTION, SOLUTION INTRAMUSCULAR; INTRAVENOUS at 12:15

## 2017-07-04 RX ADMIN — Medication 2 MG: at 16:50

## 2017-07-04 RX ADMIN — HYDROMORPHONE HYDROCHLORIDE 0.5 MG: 1 INJECTION, SOLUTION INTRAMUSCULAR; INTRAVENOUS; SUBCUTANEOUS at 13:41

## 2017-07-04 RX ADMIN — ROCURONIUM BROMIDE 15 MG: 10 INJECTION, SOLUTION INTRAVENOUS at 12:13

## 2017-07-04 RX ADMIN — Medication 10 ML: at 21:11

## 2017-07-04 RX ADMIN — PROPOFOL 150 MG: 10 INJECTION, EMULSION INTRAVENOUS at 11:59

## 2017-07-04 RX ADMIN — FENTANYL CITRATE 50 MCG: 50 INJECTION INTRAMUSCULAR; INTRAVENOUS at 19:43

## 2017-07-04 RX ADMIN — CEFAZOLIN 1 G: 1 INJECTION, POWDER, FOR SOLUTION INTRAMUSCULAR; INTRAVENOUS at 21:10

## 2017-07-04 RX ADMIN — LABETALOL HYDROCHLORIDE 10 MG: 5 INJECTION, SOLUTION INTRAVENOUS at 14:45

## 2017-07-04 RX ADMIN — FENTANYL CITRATE 50 MCG: 50 INJECTION, SOLUTION INTRAMUSCULAR; INTRAVENOUS at 11:57

## 2017-07-04 RX ADMIN — FENTANYL CITRATE 50 MCG: 50 INJECTION INTRAMUSCULAR; INTRAVENOUS at 16:08

## 2017-07-04 RX ADMIN — DIPHENHYDRAMINE HYDROCHLORIDE 12.5 MG: 50 INJECTION, SOLUTION INTRAMUSCULAR; INTRAVENOUS at 16:55

## 2017-07-04 RX ADMIN — NITROGLYCERIN 40 MCG: 40 INJECTION INTRAVENOUS at 14:44

## 2017-07-04 RX ADMIN — OXYCODONE AND ACETAMINOPHEN 1 TABLET: 5; 325 TABLET ORAL at 21:09

## 2017-07-04 RX ADMIN — ONDANSETRON 4 MG: 2 INJECTION INTRAMUSCULAR; INTRAVENOUS at 12:15

## 2017-07-04 RX ADMIN — DOCUSATE SODIUM 100 MG: 100 CAPSULE, LIQUID FILLED ORAL at 18:57

## 2017-07-04 RX ADMIN — OXYCODONE AND ACETAMINOPHEN 1 TABLET: 5; 325 TABLET ORAL at 17:04

## 2017-07-04 RX ADMIN — CLOPIDOGREL 75 MG: 75 TABLET, FILM COATED ORAL at 17:04

## 2017-07-04 RX ADMIN — FENTANYL CITRATE 50 MCG: 50 INJECTION, SOLUTION INTRAMUSCULAR; INTRAVENOUS at 12:54

## 2017-07-04 RX ADMIN — NITROGLYCERIN 40 MCG: 40 INJECTION INTRAVENOUS at 14:43

## 2017-07-04 RX ADMIN — OXYCODONE AND ACETAMINOPHEN 1 TABLET: 5; 325 TABLET ORAL at 05:15

## 2017-07-04 RX ADMIN — SUCCINYLCHOLINE CHLORIDE 100 MG: 20 INJECTION INTRAMUSCULAR; INTRAVENOUS at 11:59

## 2017-07-04 RX ADMIN — PROTAMINE SULFATE 40 MG: 10 INJECTION, SOLUTION INTRAVENOUS at 13:24

## 2017-07-04 RX ADMIN — SODIUM CHLORIDE: 900 INJECTION, SOLUTION INTRAVENOUS at 11:48

## 2017-07-04 RX ADMIN — GABAPENTIN 100 MG: 100 CAPSULE ORAL at 18:57

## 2017-07-04 RX ADMIN — CEFAZOLIN 1 G: 1 INJECTION, POWDER, FOR SOLUTION INTRAMUSCULAR; INTRAVENOUS at 12:46

## 2017-07-04 NOTE — ROUTINE PROCESS
Bedside and Verbal shift change report given Shazia Valentino (oncoming nurse) by Yancy Macias RN   (offgoing nurse). Report included the following information SBAR, Kardex, Intake/Output, MAR, Recent Results, Med Rec Status and Cardiac Rhythm NSR.

## 2017-07-04 NOTE — ROUTINE PROCESS
Assessment completed. No c/o voiced at this time. Skin pale but denies feeling weakness except in left leg. Wound vac at 125mmHg, IV's flushed, sites patent. 1000: assisted to bathroom by staff. lubna well. Medium size bowel movement noted. Stool soft    1005: patient called and stated he was bleeding. Upon assessment, bright red blood was noted draining from underneath wound vac foam dressing. tegaderm over dressing saturated and swollen. Small hole place along side of tegaderm to see if excess blood could be expressed. Small amount expressed, medium size clot noted. Blood continued to trickle from anteriolateral side of dressing onto the bed. Tegaderm began to roll up on sides. Bleeding amount increased. Tegaderm removed half way, bright red blood noted spurting out from left groin wound site. Pressure applied to femoral artery. Call light pressed for assistance. Khadijah Whittaker RN  - Charge nurse informed and in room to assist and assess patient. 1009: Jyoti Marshall CNA in room. Vital signs obtained. 107/69  70, 22. Patient pale, and reports nausea, O2 2L NC applied at 2LPM.  Bed place in trendelenburg position.  Bath Course, paged Dr. Elliot Gates. 1010: NS bolus started. 1015:  Dr. Elliot Gates on phone, Khadijah Whittaker took over holding pressure to femoral artery for me to update the physician. Dr. Elliot Gates requested that we obtain a consent for exploration of left femoral artery with possible above the knee amputation of left leg. 1017: Returned to room, resumed holding pressure. Consent given to patient and signature obtained. 1025: Daughter called and informed of fathers change in condition. Stated she would be on her way in.   1035: Pressure released, large clot noted to groin. Clot fell off, no further bleeding noted. ICU nurse Mandeep asked to come and assess patient. Provided assistance with applying dressing. [place] bath given, new gown applied. Left PP obtained.  Present and faintly palpable. Confirmed with doppler. Pulses marked. 1045: Dr. Yoel Henry in room to speak with patient regarding options for surgery. Patient verbalized understanding. New orders   1055: Patient remaining stable, family in room. 1057: Anesthesia in for consent.; blood consent also obtained. 1110:  Labs drawn, blood band applied. 1140 Transferred to OR via bed. Family at bedside at patient left. 1300: Report given to [facility] RN ICU.

## 2017-07-04 NOTE — ANESTHESIA POSTPROCEDURE EVALUATION
Post-Anesthesia Evaluation and Assessment    Patient: Pamela Castillo MRN: [id number]  SSN: [id number]    YOB: 1960  Age: 59 y.o. Sex: male       Cardiovascular Function/Vital Signs  Visit Vitals    /61    Pulse 64    Temp 36.9 °C (98.5 °F)    Resp 18    Ht 5' 10\" (1.778 m)    Wt 72.7 kg (160 lb 4.8 oz)    SpO2 97%    BMI 23 kg/m2       Patient is status post general anesthesia for Procedure(s):  EXPLORATION OF LEFT FEMORAL ARTERY/ possible BYPASS/ possible  LEFT AMPUTATION KNEE(AKA). Nausea/Vomiting: None    Postoperative hydration reviewed and adequate. Pain:  Pain Scale 1: Numeric (0 - 10) (07/04/17 1005)  Pain Intensity 1: 5 (07/04/17 1005)   Managed    Neurological Status:   Neuro (WDL): Within Defined Limits (06/27/17 1122)  Neuro  Neurologic State: Alert (07/04/17 0800)  Orientation Level: Oriented X4 (07/04/17 0800)  Cognition: Appropriate for age attention/concentration (07/04/17 0800)  Speech: Clear (07/04/17 0800)   At baseline    Mental Status and Level of Consciousness: Arousable    Pulmonary Status:   O2 Device: Oxygen mask (07/04/17 1454)   Adequate oxygenation and airway patent    Complications related to anesthesia: None    Post-anesthesia assessment completed.  No concerns    Signed By: Harris Reyna CRNA     July 4, 2017

## 2017-07-04 NOTE — ANESTHESIA PROCEDURE NOTES
Arterial Line Placement    Start time: 7/4/2017 11:50 AM  End time: 7/4/2017 11:55 AM  Performed by: Emilee Kee  Authorized by: Emilee Kee     Pre-Procedure  Indications:  Arterial pressure monitoring  Preanesthetic Checklist: patient identified, risks and benefits discussed, anesthesia consent, site marked, patient being monitored, timeout performed and patient being monitored    Timeout Time: 11:50        Procedure:   Prep:  Chlorhexidine  Seldinger Technique?: Yes    Orientation:  Left  Location:  Radial artery  Catheter size:  20 G  Number of attempts:  1    Assessment:   Post-procedure:  Line secured  Patient Tolerance:  Patient tolerated the procedure well with no immediate complications

## 2017-07-04 NOTE — ANESTHESIA PREPROCEDURE EVALUATION
Anesthetic History   No history of anesthetic complications            Review of Systems / Medical History  Patient summary reviewed and pertinent labs reviewed    Pulmonary  Within defined limits                 Neuro/Psych   Within defined limits           Cardiovascular  Within defined limits  Hypertension      CHF: NYHA Classification II  Dysrhythmias : atrial fibrillation  CAD and PAD    Exercise tolerance: <4 METS     GI/Hepatic/Renal  Within defined limits              Endo/Other  Within defined limits    Hypothyroidism  Arthritis     Other Findings   Comments:   Risk Factors for Postoperative nausea/vomiting:       History of postoperative nausea/vomiting? NO       Female? NO       Motion sickness? NO       Intended opioid administration for postoperative analgesia? YES      Smoking Abstinence  Current Smoker? NO  Elective Surgery? NO  Seen preoperatively by anesthesiologist or proxy prior to day of surgery? YES  Pt abstained from smoking 24 hours prior to anesthesia?  YES               Physical Exam    Airway  Mallampati: III  TM Distance: 4 - 6 cm  Neck ROM: normal range of motion   Mouth opening: Normal     Cardiovascular    Rhythm: regular  Rate: normal         Dental    Dentition: Edentulous     Pulmonary  Breath sounds clear to auscultation               Abdominal  GI exam deferred       Other Findings            Anesthetic Plan    ASA: 4, emergent  Anesthesia type: general          Induction: Intravenous  Anesthetic plan and risks discussed with: Patient

## 2017-07-04 NOTE — BRIEF OP NOTE
BRIEF OPERATIVE NOTE    Date of Procedure: 7/4/2017   Preoperative Diagnosis: bleeding groin  Postoperative Diagnosis: blowout of left femoral anastomosis  Procedure(s):  EXPLORATION OF LEFT FEMORAL ARTERY/ possible BYPASS/ possible  LEFT AMPUTATION KNEE(AKA)  Surgeon(s) and Role:     * Cosme Homans, MD - Primary         Assistant Staff:       Surgical Staff:  Circ-1: Keena Ramachandran RN  Circ-Relief: Jose Adams RN  Scrub Tech-1: Katelynn Desai  Surg Asst-1: Robyn Oviedo  Event Time In   Incision Start 1222   Incision Close 1439     Anesthesia: General   Estimated Blood Loss: 400mL  Specimens: * No specimens in log *   Findings: blowout of femoral anastomosi   Complications: none  Implants:   Implant Name Type Inv.  Item Serial No.  Lot No. LRB No. Used Action   GRAFT TW STRTCH RR [id number] -- PROPATEN - [id number]  GRAFT TW STRTCH RR [id number] -- PROPATEN [id number]  [employer]  Left 1 Implanted   Fibrin Sealant Tisseel    [id number]  [id number] Left 1 Implanted   KIT TISSEEL READY TO USE 10ML -- FROZEN PRODUCT MIN ORDER 6EA - [id number]   KIT TISSEEL READY TO USE 10ML -- FROZEN PRODUCT MIN ORDER 6EA [id number] OLEARY BIOSURGERY [id number] Left 1 Implanted

## 2017-07-04 NOTE — OP NOTES
[address]    Name:  Ninoska Costello  MR#:  [id number]  :  1960  Account #:  [de-identified]  Date of Adm:  2017  Date of Surgery:  2017      ATTENDING: Pablo Zelaya MD.    PREOPERATIVE DIAGNOSIS:   Left femoral blow out of anastomosis. POSTOPERATIVE DIAGNOSIS:   Left femoral blow out of  anastomosis. PROCEDURES PERFORMED:  1. Left common femoral artery ligation. 2. Jump bypass from right to left fem-fem to a more distal superficial  femoral artery using 8 mm external ringed Propaten graft. 3. Left groin wound exploration and wash out. CULTURES: None. DRAINS: None. ESTIMATED BLOOD LOSS:  400 mL. SPECIMENS REMOVED:  None. INDICATIONS FOR PROCEDURE: The patient is a 57-year-old  gentleman who has had multiple issues with bleeding in his groin. He  has had yet another blow out of his femoral anastomosis. The decision  was then made to take the patient back and perform a more distal  bypass with ligation of his common femoral artery. The patient was  given the risks and benefits of the procedure, including but not limited  to bleeding, infection, damage to adjacent structures, MI, stroke and  death as well as loss of lower extremity and need for further surgery. The patient was understanding of all the risks and underwent the  procedure. OPERATIVE FINDINGS: Complete blow out of the lateral portion of  the anastomosis, need for ligation of the common femoral artery. DESCRIPTION OF PROCEDURE:  The patient was correctly identified  in the preoperative holding area and taken to the operating room in  stable condition. The patient had pre-incision time out prior to  anesthesia. The patient was prepped and draped in normal sterile  fashion according to CDC guidelines aseptic technique. The patient  had preoperative antibiotics prior to anesthesia.  We then were able to  see the fact that the patient was not actively bleeding from his common  femoral artery, so decision was then made to dissect onto the medial  thigh. Got down to the more distal superficial femoral artery. We then  were able to loop this with red vessel loops in a Restrepo fashion both  proximally and distally and loop a large collateral with a blue vessel  loop in a Restrepo fashion. Once we had this out appropriately, we then  clamped the SFA. We heparinized patient and then we were able to  remove all previous thrombus that was within the groin, wash the groin  out and saw just complete blow out of the lateral portion of the  anastomosis yet again. We clamped the fem-fem and then ligated the  common femoral artery with large 3-0 Prolene sutures. All along the  anastomosis we cut the graft off the artery and then ligated the artery  as much as possible with some graft material left on. We then were  able to take a [place] tunneler, tunnel an 8 x 50 Propaten externally  reamed graft. We then were able to create an anastomosis with CV5  suture from the fem-fem onto the bypass in an end to end fashion. We  placed 3 repair sutures. There was some diffuse oozing, so we placed  BioGlue over it and all bleeding had stopped from that anastomosis. There was some minor ooze that was kind of coming from all our  suture sites, more BioGlue was placed within that area and then  throughout the rest of the procedure the groin was completely dry. We  then were able to cut our graft to appropriate size, remove the rings  appropriately, clamp our SFA appropriately, create an arteriotomy  using an 11 blade and Pott scissors, cut our graft to appropriate size  and created an anastomosis with CV5 suture. We had a good  anastomosis. There were a few areas that were bleeding, 3 to 4  sutures were placed on those areas. But then there was just diffuse  ooze coming from the graft itself as well as the graft holes.  We  attempted sutures to shut down these needle sticks, but we were  unable to get any good hemostasis. We reversed the patient and then  with a combination of Surgicel, Tisseel and BioGlue as well as Nu-knit  and manual compression as well as thrombin Gelfoam, we were finally  able to get the patient to stop oozing and not be bleeding. Some  Surgicel was left within the patient. We closed with a multi-layer 3-0  Vicryl fashion and skin staples for the SFA lesion and then up top we  placed petroleum dressing and then placed a wound Vac in there. It measured 12 cm  long, 4 cm wide and 3 cm deep. The patient tolerated the procedure  well and after being dry for about 5 minutes we were able to get him to  the ICU in a stable condition. Of note, we did use Doppler. We had good biphasic signals within the  proximal and distal SFA of our anastomosis at the end of the  procedure.     ANESTHESIA:  MD ALFONSO Villar / ROSARIO  D:  07/04/2017   14:52  T:  07/04/2017   19:27  Job #:  [id number]

## 2017-07-05 LAB
ANION GAP BLD CALC-SCNC: 7 MMOL/L (ref 3–18)
BASOPHILS # BLD AUTO: 0 K/UL (ref 0–0.1)
BASOPHILS # BLD: 0 % (ref 0–2)
BUN SERPL-MCNC: 6 MG/DL (ref 7–18)
BUN/CREAT SERPL: 10 (ref 12–20)
CALCIUM SERPL-MCNC: 8.2 MG/DL (ref 8.5–10.1)
CHLORIDE SERPL-SCNC: 100 MMOL/L (ref 100–108)
CO2 SERPL-SCNC: 28 MMOL/L (ref 21–32)
CREAT SERPL-MCNC: 0.6 MG/DL (ref 0.6–1.3)
DIFFERENTIAL METHOD BLD: ABNORMAL
EOSINOPHIL # BLD: 0.1 K/UL (ref 0–0.4)
EOSINOPHIL NFR BLD: 1 % (ref 0–5)
ERYTHROCYTE [DISTWIDTH] IN BLOOD BY AUTOMATED COUNT: 17.8 % (ref 11.6–14.5)
GLUCOSE SERPL-MCNC: 96 MG/DL (ref 74–99)
HCT VFR BLD AUTO: 21 % (ref 36–48)
HCT VFR BLD AUTO: 27 % (ref 36–48)
HGB BLD-MCNC: 7 G/DL (ref 13–16)
HGB BLD-MCNC: 9.1 G/DL (ref 13–16)
LYMPHOCYTES # BLD AUTO: 13 % (ref 21–52)
LYMPHOCYTES # BLD: 1.7 K/UL (ref 0.9–3.6)
MCH RBC QN AUTO: 28 PG (ref 24–34)
MCHC RBC AUTO-ENTMCNC: 33.3 G/DL (ref 31–37)
MCV RBC AUTO: 84 FL (ref 74–97)
MONOCYTES # BLD: 1.2 K/UL (ref 0.05–1.2)
MONOCYTES NFR BLD AUTO: 9 % (ref 3–10)
NEUTS SEG # BLD: 10.1 K/UL (ref 1.8–8)
NEUTS SEG NFR BLD AUTO: 77 % (ref 40–73)
PLATELET # BLD AUTO: 386 K/UL (ref 135–420)
PMV BLD AUTO: 8.8 FL (ref 9.2–11.8)
POTASSIUM SERPL-SCNC: 4 MMOL/L (ref 3.5–5.5)
RBC # BLD AUTO: 2.5 M/UL (ref 4.7–5.5)
SODIUM SERPL-SCNC: 135 MMOL/L (ref 136–145)
WBC # BLD AUTO: 13.1 K/UL (ref 4.6–13.2)

## 2017-07-05 PROCEDURE — 74011250637 HC RX REV CODE- 250/637: Performed by: SURGERY

## 2017-07-05 PROCEDURE — 85018 HEMOGLOBIN: CPT | Performed by: SURGERY

## 2017-07-05 PROCEDURE — 65620000000 HC RM CCU GENERAL

## 2017-07-05 PROCEDURE — 85025 COMPLETE CBC W/AUTO DIFF WBC: CPT | Performed by: SURGERY

## 2017-07-05 PROCEDURE — 74011000258 HC RX REV CODE- 258: Performed by: SURGERY

## 2017-07-05 PROCEDURE — P9016 RBC LEUKOCYTES REDUCED: HCPCS | Performed by: ANESTHESIOLOGY

## 2017-07-05 PROCEDURE — 36430 TRANSFUSION BLD/BLD COMPNT: CPT

## 2017-07-05 PROCEDURE — 74011250636 HC RX REV CODE- 250/636: Performed by: SURGERY

## 2017-07-05 PROCEDURE — 80048 BASIC METABOLIC PNL TOTAL CA: CPT | Performed by: SURGERY

## 2017-07-05 RX ORDER — FUROSEMIDE 10 MG/ML
20 INJECTION INTRAMUSCULAR; INTRAVENOUS SEE ADMIN INSTRUCTIONS
Status: COMPLETED | OUTPATIENT
Start: 2017-07-05 | End: 2017-07-05

## 2017-07-05 RX ORDER — SODIUM CHLORIDE 9 MG/ML
250 INJECTION, SOLUTION INTRAVENOUS AS NEEDED
Status: DISCONTINUED | OUTPATIENT
Start: 2017-07-05 | End: 2017-07-12 | Stop reason: HOSPADM

## 2017-07-05 RX ORDER — FUROSEMIDE 10 MG/ML
20 INJECTION INTRAMUSCULAR; INTRAVENOUS ONCE
Status: DISCONTINUED | OUTPATIENT
Start: 2017-07-05 | End: 2017-07-05

## 2017-07-05 RX ADMIN — HEPARIN SODIUM 5000 UNITS: 5000 INJECTION, SOLUTION INTRAVENOUS; SUBCUTANEOUS at 05:24

## 2017-07-05 RX ADMIN — FERROUS SULFATE TAB 325 MG (65 MG ELEMENTAL FE) 325 MG: 325 (65 FE) TAB at 08:36

## 2017-07-05 RX ADMIN — GABAPENTIN 100 MG: 100 CAPSULE ORAL at 17:17

## 2017-07-05 RX ADMIN — DILTIAZEM HYDROCHLORIDE 30 MG: 30 TABLET, FILM COATED ORAL at 17:16

## 2017-07-05 RX ADMIN — DILTIAZEM HYDROCHLORIDE 30 MG: 30 TABLET, FILM COATED ORAL at 21:32

## 2017-07-05 RX ADMIN — DILTIAZEM HYDROCHLORIDE 30 MG: 30 TABLET, FILM COATED ORAL at 11:03

## 2017-07-05 RX ADMIN — OXYCODONE AND ACETAMINOPHEN 1 TABLET: 5; 325 TABLET ORAL at 17:15

## 2017-07-05 RX ADMIN — GABAPENTIN 100 MG: 100 CAPSULE ORAL at 08:35

## 2017-07-05 RX ADMIN — AMIODARONE HYDROCHLORIDE 200 MG: 200 TABLET ORAL at 08:36

## 2017-07-05 RX ADMIN — ASPIRIN 81 MG CHEWABLE TABLET 81 MG: 81 TABLET CHEWABLE at 08:35

## 2017-07-05 RX ADMIN — FUROSEMIDE 20 MG: 10 INJECTION, SOLUTION INTRAVENOUS at 11:03

## 2017-07-05 RX ADMIN — DOCUSATE SODIUM 100 MG: 100 CAPSULE, LIQUID FILLED ORAL at 17:17

## 2017-07-05 RX ADMIN — CYANOCOBALAMIN TAB 1000 MCG 1000 MCG: 1000 TAB at 08:35

## 2017-07-05 RX ADMIN — Medication 250 MG: at 08:36

## 2017-07-05 RX ADMIN — OXYCODONE AND ACETAMINOPHEN 1 TABLET: 5; 325 TABLET ORAL at 09:45

## 2017-07-05 RX ADMIN — OXYCODONE AND ACETAMINOPHEN 1 TABLET: 5; 325 TABLET ORAL at 21:33

## 2017-07-05 RX ADMIN — Medication 10 ML: at 05:24

## 2017-07-05 RX ADMIN — LOSARTAN POTASSIUM 25 MG: 25 TABLET, FILM COATED ORAL at 08:35

## 2017-07-05 RX ADMIN — HEPARIN SODIUM 5000 UNITS: 5000 INJECTION, SOLUTION INTRAVENOUS; SUBCUTANEOUS at 13:27

## 2017-07-05 RX ADMIN — CEFAZOLIN 1 G: 1 INJECTION, POWDER, FOR SOLUTION INTRAMUSCULAR; INTRAVENOUS at 05:23

## 2017-07-05 RX ADMIN — Medication 10 ML: at 17:18

## 2017-07-05 RX ADMIN — METOPROLOL TARTRATE 12.5 MG: 25 TABLET ORAL at 08:36

## 2017-07-05 RX ADMIN — METOPROLOL TARTRATE 12.5 MG: 25 TABLET ORAL at 21:32

## 2017-07-05 RX ADMIN — OXYCODONE AND ACETAMINOPHEN 1 TABLET: 5; 325 TABLET ORAL at 05:31

## 2017-07-05 RX ADMIN — ATORVASTATIN CALCIUM 40 MG: 40 TABLET, FILM COATED ORAL at 21:32

## 2017-07-05 RX ADMIN — HEPARIN SODIUM 5000 UNITS: 5000 INJECTION, SOLUTION INTRAVENOUS; SUBCUTANEOUS at 21:32

## 2017-07-05 RX ADMIN — OXYCODONE AND ACETAMINOPHEN 1 TABLET: 5; 325 TABLET ORAL at 00:56

## 2017-07-05 RX ADMIN — VITAMIN D, TAB 1000IU (100/BT) 1000 UNITS: 25 TAB at 08:35

## 2017-07-05 RX ADMIN — DOCUSATE SODIUM 100 MG: 100 CAPSULE, LIQUID FILLED ORAL at 08:35

## 2017-07-05 RX ADMIN — FUROSEMIDE 20 MG: 10 INJECTION, SOLUTION INTRAVENOUS at 08:36

## 2017-07-05 RX ADMIN — OXYCODONE AND ACETAMINOPHEN 1 TABLET: 5; 325 TABLET ORAL at 13:32

## 2017-07-05 RX ADMIN — AMIODARONE HYDROCHLORIDE 200 MG: 200 TABLET ORAL at 17:17

## 2017-07-05 RX ADMIN — DILTIAZEM HYDROCHLORIDE 30 MG: 30 TABLET, FILM COATED ORAL at 08:35

## 2017-07-05 RX ADMIN — CEFAZOLIN 1 G: 1 INJECTION, POWDER, FOR SOLUTION INTRAMUSCULAR; INTRAVENOUS at 21:31

## 2017-07-05 RX ADMIN — DULOXETINE HYDROCHLORIDE 60 MG: 60 CAPSULE, DELAYED RELEASE ORAL at 08:35

## 2017-07-05 RX ADMIN — CEFAZOLIN 1 G: 1 INJECTION, POWDER, FOR SOLUTION INTRAMUSCULAR; INTRAVENOUS at 13:27

## 2017-07-05 RX ADMIN — CLOPIDOGREL 75 MG: 75 TABLET, FILM COATED ORAL at 17:16

## 2017-07-05 RX ADMIN — Medication 10 ML: at 22:00

## 2017-07-05 NOTE — ROUTINE PROCESS
Dr Latia Fox notified of Hgb. 7.0. Orders received  0730 Bedside, Verbal and Written shift change report given to Ines Fine (oncoming nurse) by Asif Reynolds (offgoing nurse). Report included the following information SBAR, Kardex, OR Summary, Procedure Summary, Intake/Output, MAR, Recent Results, Med Rec Status, Cardiac Rhythm NSR and Alarm Parameters .

## 2017-07-05 NOTE — PROGRESS NOTES
1547-Assume care of patient. Pt awake alert NAD. Repeat labs sent. 1700-Pt son-in-law at bedside. Patient given percocet. 1820-Pt Daughter given updates.

## 2017-07-05 NOTE — ROUTINE PROCESS
Bedside and Verbal shift change report given to Cary Nichols RN  (oncoming nurse) by Kami David RN  (offgoing nurse). Report included the following information SBAR, Kardex, ED Summary, OR Summary, Intake/Output, MAR and Recent Results.

## 2017-07-05 NOTE — PROGRESS NOTES
0730: Bed side shift reporting done, blood transfusion in progress, assessed, pt has currently denied pain but c/o tenderness to left groin with touch. Wound vac on suctioning at 125.

## 2017-07-05 NOTE — PROGRESS NOTES
Care Management Interventions  Transition of Care Consult (CM Consult): Home Health, Discharge Planning  [address] [address]: Yes  Current Support Network: Relative's Home, Family Lives Nearby (Pt will be staying with his brother and his girlfriend upon discharge)  Confirm Follow Up Transport: Family  Plan discussed with Pt/Family/Caregiver: Yes  Freedom of Choice Offered: Yes  Discharge Location  Discharge Placement: Home with home health    Patient is a 58 yo male admitted for a right quadrant mass on bypass. The patient received a wound vac for home use which has been delivered and is currently at his bedside. The patient has a  order for wound vac care. Patient also had [facility] prior to admission for wound care of the other wounds on his right leg. Patient will need IP Consult to [facility] Franklyn Sam[place] for resumption of previous home care orders. Patient will be staying with his brother and his brother's girlfriend upon discharge. He states they will be assisting him until he is well enough to return to his own home. He has a rolling walker already. Patient's service delivery address will be: [address] [place] [address], [address] [place] [address]  which is his brother's, Judge Limon, address. Contact telephone number remains the same.

## 2017-07-06 ENCOUNTER — HOME HEALTH ADMISSION (OUTPATIENT)
Dept: HOME HEALTH SERVICES | Facility: HOME HEALTH | Age: 57
End: 2017-07-06

## 2017-07-06 LAB
HCT VFR BLD AUTO: 27.1 % (ref 36–48)
HGB BLD-MCNC: 9.1 G/DL (ref 13–16)

## 2017-07-06 PROCEDURE — 85018 HEMOGLOBIN: CPT | Performed by: SURGERY

## 2017-07-06 PROCEDURE — 74011000258 HC RX REV CODE- 258: Performed by: SURGERY

## 2017-07-06 PROCEDURE — 77030011256 HC DRSG MEPILEX <16IN NO BORD MOLN -A

## 2017-07-06 PROCEDURE — 74011250636 HC RX REV CODE- 250/636: Performed by: SURGERY

## 2017-07-06 PROCEDURE — 74011250637 HC RX REV CODE- 250/637: Performed by: SURGERY

## 2017-07-06 PROCEDURE — 65660000004 HC RM CVT STEPDOWN

## 2017-07-06 PROCEDURE — 77030029211 HC GEL MEDIH TU INLC -B

## 2017-07-06 RX ORDER — OXYCODONE AND ACETAMINOPHEN 5; 325 MG/1; MG/1
2 TABLET ORAL
Status: DISCONTINUED | OUTPATIENT
Start: 2017-07-06 | End: 2017-07-12 | Stop reason: HOSPADM

## 2017-07-06 RX ADMIN — HEPARIN SODIUM 5000 UNITS: 5000 INJECTION, SOLUTION INTRAVENOUS; SUBCUTANEOUS at 22:33

## 2017-07-06 RX ADMIN — OXYCODONE AND ACETAMINOPHEN 1 TABLET: 5; 325 TABLET ORAL at 10:39

## 2017-07-06 RX ADMIN — HEPARIN SODIUM 5000 UNITS: 5000 INJECTION, SOLUTION INTRAVENOUS; SUBCUTANEOUS at 12:06

## 2017-07-06 RX ADMIN — GABAPENTIN 100 MG: 100 CAPSULE ORAL at 09:24

## 2017-07-06 RX ADMIN — GABAPENTIN 100 MG: 100 CAPSULE ORAL at 17:28

## 2017-07-06 RX ADMIN — Medication 10 ML: at 15:11

## 2017-07-06 RX ADMIN — Medication 2 MG: at 12:05

## 2017-07-06 RX ADMIN — Medication 250 MG: at 09:24

## 2017-07-06 RX ADMIN — VITAMIN D, TAB 1000IU (100/BT) 1000 UNITS: 25 TAB at 09:24

## 2017-07-06 RX ADMIN — OXYCODONE AND ACETAMINOPHEN 2 TABLET: 5; 325 TABLET ORAL at 22:44

## 2017-07-06 RX ADMIN — CLOPIDOGREL 75 MG: 75 TABLET, FILM COATED ORAL at 17:28

## 2017-07-06 RX ADMIN — Medication 10 ML: at 06:00

## 2017-07-06 RX ADMIN — AMIODARONE HYDROCHLORIDE 200 MG: 200 TABLET ORAL at 09:24

## 2017-07-06 RX ADMIN — OXYCODONE AND ACETAMINOPHEN 1 TABLET: 5; 325 TABLET ORAL at 01:17

## 2017-07-06 RX ADMIN — CEFAZOLIN 1 G: 1 INJECTION, POWDER, FOR SOLUTION INTRAMUSCULAR; INTRAVENOUS at 22:35

## 2017-07-06 RX ADMIN — ASPIRIN 81 MG CHEWABLE TABLET 81 MG: 81 TABLET CHEWABLE at 09:23

## 2017-07-06 RX ADMIN — OXYCODONE AND ACETAMINOPHEN 1 TABLET: 5; 325 TABLET ORAL at 05:23

## 2017-07-06 RX ADMIN — OXYCODONE AND ACETAMINOPHEN 2 TABLET: 5; 325 TABLET ORAL at 19:05

## 2017-07-06 RX ADMIN — DILTIAZEM HYDROCHLORIDE 30 MG: 30 TABLET, FILM COATED ORAL at 12:05

## 2017-07-06 RX ADMIN — OXYCODONE AND ACETAMINOPHEN 2 TABLET: 5; 325 TABLET ORAL at 15:10

## 2017-07-06 RX ADMIN — CYANOCOBALAMIN TAB 1000 MCG 1000 MCG: 1000 TAB at 09:24

## 2017-07-06 RX ADMIN — Medication 10 ML: at 22:42

## 2017-07-06 RX ADMIN — DOCUSATE SODIUM 100 MG: 100 CAPSULE, LIQUID FILLED ORAL at 17:28

## 2017-07-06 RX ADMIN — FERROUS SULFATE TAB 325 MG (65 MG ELEMENTAL FE) 325 MG: 325 (65 FE) TAB at 09:24

## 2017-07-06 RX ADMIN — CEFAZOLIN 1 G: 1 INJECTION, POWDER, FOR SOLUTION INTRAMUSCULAR; INTRAVENOUS at 05:23

## 2017-07-06 RX ADMIN — ATORVASTATIN CALCIUM 40 MG: 40 TABLET, FILM COATED ORAL at 22:33

## 2017-07-06 RX ADMIN — LOSARTAN POTASSIUM 25 MG: 25 TABLET, FILM COATED ORAL at 09:24

## 2017-07-06 RX ADMIN — DILTIAZEM HYDROCHLORIDE 30 MG: 30 TABLET, FILM COATED ORAL at 09:24

## 2017-07-06 RX ADMIN — Medication 2 MG: at 03:41

## 2017-07-06 RX ADMIN — METOPROLOL TARTRATE 12.5 MG: 25 TABLET ORAL at 09:23

## 2017-07-06 RX ADMIN — Medication 2 MG: at 00:53

## 2017-07-06 RX ADMIN — DOCUSATE SODIUM 100 MG: 100 CAPSULE, LIQUID FILLED ORAL at 09:23

## 2017-07-06 RX ADMIN — CEFAZOLIN 1 G: 1 INJECTION, POWDER, FOR SOLUTION INTRAMUSCULAR; INTRAVENOUS at 12:06

## 2017-07-06 RX ADMIN — HEPARIN SODIUM 5000 UNITS: 5000 INJECTION, SOLUTION INTRAVENOUS; SUBCUTANEOUS at 05:23

## 2017-07-06 RX ADMIN — DULOXETINE HYDROCHLORIDE 60 MG: 60 CAPSULE, DELAYED RELEASE ORAL at 09:23

## 2017-07-06 NOTE — PROGRESS NOTES
Transfused yesterday for acute blood loss anemia from surgical bleeding. Currently feeling better. Okay to transfer to stepdown. Would like to keep in house till Monday to perform a couple wound vac changes in hospital setting to confirm no further bleeding problems. Will reevaluate hospital status at that point.

## 2017-07-06 NOTE — PROGRESS NOTES
Sitting up, tolerates breakfast  VSS  Vac and incision clean and no bleed  Hemoglobin stable  Ok for OOB to chair  Will discuss with Dr Jasen Aggarwal regarding [facility]

## 2017-07-06 NOTE — PROGRESS NOTES
0700 Bedside shift change report received from [address] [place] [address] (offgoing nurse). Report included the following information SBAR, Kardex, Procedure Summary, Intake/Output, MAR, Recent Results, Med Rec Status and Cardiac Rhythm SR/SB.     0800: Assessment complete and as charted. Wound vac intact with no leaking or clotting. Patient pain tolerated at 3/10.     0930: Artline removed per doctors order and patient up to chair. 1000: groin site intact, no bleeding noted. Rates patient 7/10, medicated for pain at 1030.     1200: Still c/o pain but not as bad. Helped back to bed and gave morphine for breakthrough pain. 1330: Helped to bathroom. Patient had BM. Groin site intact no bleeding. Wound vac with no issues. 1530: Dressings changed to right leg, cleaned with wound cleanser and honey applied to all three wounds. Foam dressing applied along with Kerlix and tube dressing. 1630: Dressing to right side surgical site changed. Site cleaned with NS.     1900: Medicated for pain    1915: Bedside shift change report given to Kartik Estes (oncoming nurse).  Report included the following information SBAR, Procedure Summary, Intake/Output, MAR, Med Rec Status and Cardiac Rhythm SB.

## 2017-07-07 LAB
BLD PROD TYP BPU: NORMAL
BLD PROD TYP BPU: NORMAL
BPU ID: NORMAL
BPU ID: NORMAL
CALLED TO:,BCALL1: NORMAL
STATUS OF UNIT,%ST: NORMAL
STATUS OF UNIT,%ST: NORMAL
UNIT DIVISION, %UDIV: 0
UNIT DIVISION, %UDIV: 0

## 2017-07-07 PROCEDURE — 65660000004 HC RM CVT STEPDOWN

## 2017-07-07 PROCEDURE — 77030027138 HC INCENT SPIROMETER -A

## 2017-07-07 PROCEDURE — 74011000258 HC RX REV CODE- 258: Performed by: SURGERY

## 2017-07-07 PROCEDURE — 74011250636 HC RX REV CODE- 250/636: Performed by: SURGERY

## 2017-07-07 PROCEDURE — 74011250637 HC RX REV CODE- 250/637: Performed by: SURGERY

## 2017-07-07 RX ADMIN — METOPROLOL TARTRATE 12.5 MG: 25 TABLET ORAL at 09:45

## 2017-07-07 RX ADMIN — Medication 10 ML: at 14:58

## 2017-07-07 RX ADMIN — OXYCODONE AND ACETAMINOPHEN 2 TABLET: 5; 325 TABLET ORAL at 03:31

## 2017-07-07 RX ADMIN — AMIODARONE HYDROCHLORIDE 200 MG: 200 TABLET ORAL at 09:46

## 2017-07-07 RX ADMIN — VITAMIN D, TAB 1000IU (100/BT) 1000 UNITS: 25 TAB at 09:46

## 2017-07-07 RX ADMIN — CEFAZOLIN 1 G: 1 INJECTION, POWDER, FOR SOLUTION INTRAMUSCULAR; INTRAVENOUS at 22:28

## 2017-07-07 RX ADMIN — HEPARIN SODIUM 5000 UNITS: 5000 INJECTION, SOLUTION INTRAVENOUS; SUBCUTANEOUS at 05:20

## 2017-07-07 RX ADMIN — AMIODARONE HYDROCHLORIDE 200 MG: 200 TABLET ORAL at 17:48

## 2017-07-07 RX ADMIN — OXYCODONE AND ACETAMINOPHEN 2 TABLET: 5; 325 TABLET ORAL at 16:13

## 2017-07-07 RX ADMIN — DOCUSATE SODIUM 100 MG: 100 CAPSULE, LIQUID FILLED ORAL at 17:48

## 2017-07-07 RX ADMIN — GABAPENTIN 100 MG: 100 CAPSULE ORAL at 17:49

## 2017-07-07 RX ADMIN — GABAPENTIN 100 MG: 100 CAPSULE ORAL at 09:46

## 2017-07-07 RX ADMIN — Medication 2 MG: at 22:22

## 2017-07-07 RX ADMIN — HEPARIN SODIUM 5000 UNITS: 5000 INJECTION, SOLUTION INTRAVENOUS; SUBCUTANEOUS at 13:00

## 2017-07-07 RX ADMIN — CEFAZOLIN 1 G: 1 INJECTION, POWDER, FOR SOLUTION INTRAMUSCULAR; INTRAVENOUS at 05:16

## 2017-07-07 RX ADMIN — Medication 250 MG: at 09:45

## 2017-07-07 RX ADMIN — FERROUS SULFATE TAB 325 MG (65 MG ELEMENTAL FE) 325 MG: 325 (65 FE) TAB at 09:45

## 2017-07-07 RX ADMIN — HEPARIN SODIUM 5000 UNITS: 5000 INJECTION, SOLUTION INTRAVENOUS; SUBCUTANEOUS at 22:26

## 2017-07-07 RX ADMIN — DILTIAZEM HYDROCHLORIDE 30 MG: 30 TABLET, FILM COATED ORAL at 09:46

## 2017-07-07 RX ADMIN — DILTIAZEM HYDROCHLORIDE 30 MG: 30 TABLET, FILM COATED ORAL at 16:18

## 2017-07-07 RX ADMIN — Medication 10 ML: at 22:32

## 2017-07-07 RX ADMIN — DILTIAZEM HYDROCHLORIDE 30 MG: 30 TABLET, FILM COATED ORAL at 22:33

## 2017-07-07 RX ADMIN — ATORVASTATIN CALCIUM 40 MG: 40 TABLET, FILM COATED ORAL at 22:27

## 2017-07-07 RX ADMIN — OXYCODONE AND ACETAMINOPHEN 2 TABLET: 5; 325 TABLET ORAL at 11:26

## 2017-07-07 RX ADMIN — CYANOCOBALAMIN TAB 1000 MCG 1000 MCG: 1000 TAB at 09:45

## 2017-07-07 RX ADMIN — CEFAZOLIN 1 G: 1 INJECTION, POWDER, FOR SOLUTION INTRAMUSCULAR; INTRAVENOUS at 14:56

## 2017-07-07 RX ADMIN — Medication 10 ML: at 05:22

## 2017-07-07 RX ADMIN — ASPIRIN 81 MG CHEWABLE TABLET 81 MG: 81 TABLET CHEWABLE at 09:46

## 2017-07-07 RX ADMIN — DILTIAZEM HYDROCHLORIDE 30 MG: 30 TABLET, FILM COATED ORAL at 11:26

## 2017-07-07 RX ADMIN — DULOXETINE HYDROCHLORIDE 60 MG: 60 CAPSULE, DELAYED RELEASE ORAL at 09:46

## 2017-07-07 RX ADMIN — LOSARTAN POTASSIUM 25 MG: 25 TABLET, FILM COATED ORAL at 09:45

## 2017-07-07 RX ADMIN — DOCUSATE SODIUM 100 MG: 100 CAPSULE, LIQUID FILLED ORAL at 09:45

## 2017-07-07 RX ADMIN — CLOPIDOGREL 75 MG: 75 TABLET, FILM COATED ORAL at 16:17

## 2017-07-07 NOTE — ROUTINE PROCESS
Bedside shift change report given to Angi YANEZ (oncoming nurse) by Christina Gonsalez (offgoing nurse). Report given with SBAR, Kardex, Intake/Output, MAR and Recent Results.

## 2017-07-07 NOTE — ROUTINE PROCESS
TRANSFER - OUT REPORT:    Verbal [facility]T stepdown(unit) for routine progression of care       Report consisted of patients Situation, Background, Assessment and   Recommendations(SBAR). Information from the following report(s) SBAR, Kardex, Intake/Output, MAR, Recent Results and Cardiac Rhythm sinus alden was reviewed with the receiving nurse. Lines:   Peripheral IV Right Hand (Active)   Site Assessment Clean, dry, & intact 7/6/2017  8:00 AM   Phlebitis Assessment 0 7/6/2017  8:00 AM   Infiltration Assessment 0 7/6/2017  8:00 AM   Dressing Status Clean, dry, & intact 7/6/2017  8:00 AM   Dressing Type Trach dressing 7/6/2017  8:00 AM   Hub Color/Line Status Pink;Flushed 7/5/2017  8:00 PM   Alcohol Cap Used No 7/4/2017  2:54 PM       Peripheral IV 07/04/17 Right Arm (Active)   Site Assessment Clean, dry, & intact 7/6/2017  8:00 AM   Phlebitis Assessment 0 7/6/2017  8:00 AM   Infiltration Assessment 0 7/6/2017  8:00 AM   Dressing Status Clean, dry, & intact 7/6/2017  8:00 AM   Dressing Type Transparent 7/6/2017  8:00 AM   Hub Color/Line Status Pink;Flushed 7/5/2017  8:00 PM        Opportunity for questions and clarification was provided.       Patient transported with:   Registered Nurse

## 2017-07-07 NOTE — PROGRESS NOTES
Per Fabiana Means LPN at [facility] ,[address] needs to be done for pt. FOC done & pt chose [facility] . Referral entered & [facility] LPN notified.

## 2017-07-07 NOTE — ROUTINE PROCESS
0700-Bedside shift change report given to Mellissa Mcardle, RN (oncoming nurse) by Cha De La Vega RN (offgoing nurse). Report included the following information SBAR, Kardex and MAR.             0830- patient awake and alert. No distress noted. 1100- family at bedside. 1730- Patient off unit for echo. 1745- Patient on unit from test.       1900- Bedside shift change report given to Dilcia Glez RN (oncoming nurse) by Mellissa Mcardle, RN (offgoing nurse). Report included the following information SBAR, Kardex and MAR.

## 2017-07-07 NOTE — HOME CARE
Pt is active to [facility] for SN/PT/TO - Rec order for SN to continue - [facility] will continue to follow- D Rafael RN

## 2017-07-08 LAB
ABO + RH BLD: NORMAL
ANION GAP BLD CALC-SCNC: 8 MMOL/L (ref 3–18)
APTT PPP: 33.3 SEC (ref 23–36.4)
BASOPHILS # BLD AUTO: 0 K/UL (ref 0–0.06)
BASOPHILS # BLD: 0 % (ref 0–3)
BLD PROD TYP BPU: NORMAL
BLOOD GROUP ANTIBODIES SERPL: NORMAL
BPU ID: NORMAL
BUN SERPL-MCNC: 9 MG/DL (ref 7–18)
BUN/CREAT SERPL: 14 (ref 12–20)
CALCIUM SERPL-MCNC: 9 MG/DL (ref 8.5–10.1)
CALLED TO:,BCALL1: NORMAL
CHLORIDE SERPL-SCNC: 96 MMOL/L (ref 100–108)
CO2 SERPL-SCNC: 30 MMOL/L (ref 21–32)
CREAT SERPL-MCNC: 0.65 MG/DL (ref 0.6–1.3)
CROSSMATCH RESULT,%XM: NORMAL
DIFFERENTIAL METHOD BLD: ABNORMAL
EOSINOPHIL # BLD: 0.1 K/UL (ref 0–0.4)
EOSINOPHIL NFR BLD: 1 % (ref 0–5)
ERYTHROCYTE [DISTWIDTH] IN BLOOD BY AUTOMATED COUNT: 16.4 % (ref 11.6–14.5)
GLUCOSE SERPL-MCNC: 95 MG/DL (ref 74–99)
HCT VFR BLD AUTO: 30.9 % (ref 36–48)
HGB BLD-MCNC: 10.3 G/DL (ref 13–16)
LYMPHOCYTES # BLD AUTO: 11 % (ref 20–51)
LYMPHOCYTES # BLD: 1.2 K/UL (ref 0.8–3.5)
MCH RBC QN AUTO: 28.9 PG (ref 24–34)
MCHC RBC AUTO-ENTMCNC: 33.3 G/DL (ref 31–37)
MCV RBC AUTO: 86.8 FL (ref 74–97)
MONOCYTES # BLD: 0.8 K/UL (ref 0–1)
MONOCYTES NFR BLD AUTO: 7 % (ref 2–9)
NEUTS SEG # BLD: 9.2 K/UL (ref 1.8–8)
NEUTS SEG NFR BLD AUTO: 81 % (ref 42–75)
PLATELET # BLD AUTO: 604 K/UL (ref 135–420)
PLATELET COMMENTS,PCOM: ABNORMAL
PMV BLD AUTO: 9.2 FL (ref 9.2–11.8)
POTASSIUM SERPL-SCNC: 4.5 MMOL/L (ref 3.5–5.5)
RBC # BLD AUTO: 3.56 M/UL (ref 4.7–5.5)
RBC MORPH BLD: ABNORMAL
RBC MORPH BLD: ABNORMAL
SODIUM SERPL-SCNC: 134 MMOL/L (ref 136–145)
SPECIMEN EXP DATE BLD: NORMAL
STATUS OF UNIT,%ST: NORMAL
UNIT DIVISION, %UDIV: 0
WBC # BLD AUTO: 11.3 K/UL (ref 4.6–13.2)

## 2017-07-08 PROCEDURE — 74011250636 HC RX REV CODE- 250/636: Performed by: SURGERY

## 2017-07-08 PROCEDURE — 74011250637 HC RX REV CODE- 250/637: Performed by: SURGERY

## 2017-07-08 PROCEDURE — 65660000004 HC RM CVT STEPDOWN

## 2017-07-08 PROCEDURE — 85025 COMPLETE CBC W/AUTO DIFF WBC: CPT | Performed by: SURGERY

## 2017-07-08 PROCEDURE — 80048 BASIC METABOLIC PNL TOTAL CA: CPT | Performed by: SURGERY

## 2017-07-08 PROCEDURE — 85730 THROMBOPLASTIN TIME PARTIAL: CPT | Performed by: SURGERY

## 2017-07-08 PROCEDURE — 36415 COLL VENOUS BLD VENIPUNCTURE: CPT | Performed by: SURGERY

## 2017-07-08 PROCEDURE — 74011000258 HC RX REV CODE- 258: Performed by: SURGERY

## 2017-07-08 RX ADMIN — ASPIRIN 81 MG CHEWABLE TABLET 81 MG: 81 TABLET CHEWABLE at 08:37

## 2017-07-08 RX ADMIN — DOCUSATE SODIUM 100 MG: 100 CAPSULE, LIQUID FILLED ORAL at 17:38

## 2017-07-08 RX ADMIN — OXYCODONE AND ACETAMINOPHEN 2 TABLET: 5; 325 TABLET ORAL at 14:31

## 2017-07-08 RX ADMIN — ATORVASTATIN CALCIUM 40 MG: 40 TABLET, FILM COATED ORAL at 20:46

## 2017-07-08 RX ADMIN — AMIODARONE HYDROCHLORIDE 200 MG: 200 TABLET ORAL at 08:37

## 2017-07-08 RX ADMIN — DILTIAZEM HYDROCHLORIDE 30 MG: 30 TABLET, FILM COATED ORAL at 17:38

## 2017-07-08 RX ADMIN — Medication 10 ML: at 20:52

## 2017-07-08 RX ADMIN — DILTIAZEM HYDROCHLORIDE 30 MG: 30 TABLET, FILM COATED ORAL at 14:31

## 2017-07-08 RX ADMIN — AMIODARONE HYDROCHLORIDE 200 MG: 200 TABLET ORAL at 17:38

## 2017-07-08 RX ADMIN — HEPARIN SODIUM 5000 UNITS: 5000 INJECTION, SOLUTION INTRAVENOUS; SUBCUTANEOUS at 14:32

## 2017-07-08 RX ADMIN — Medication 10 ML: at 06:09

## 2017-07-08 RX ADMIN — CLOPIDOGREL 75 MG: 75 TABLET, FILM COATED ORAL at 17:38

## 2017-07-08 RX ADMIN — OXYCODONE AND ACETAMINOPHEN 2 TABLET: 5; 325 TABLET ORAL at 23:09

## 2017-07-08 RX ADMIN — VITAMIN D, TAB 1000IU (100/BT) 1000 UNITS: 25 TAB at 08:37

## 2017-07-08 RX ADMIN — Medication 10 ML: at 17:39

## 2017-07-08 RX ADMIN — FERROUS SULFATE TAB 325 MG (65 MG ELEMENTAL FE) 325 MG: 325 (65 FE) TAB at 08:37

## 2017-07-08 RX ADMIN — DILTIAZEM HYDROCHLORIDE 30 MG: 30 TABLET, FILM COATED ORAL at 22:00

## 2017-07-08 RX ADMIN — CYANOCOBALAMIN TAB 1000 MCG 1000 MCG: 1000 TAB at 08:37

## 2017-07-08 RX ADMIN — GABAPENTIN 100 MG: 100 CAPSULE ORAL at 17:38

## 2017-07-08 RX ADMIN — METOPROLOL TARTRATE 12.5 MG: 25 TABLET ORAL at 20:39

## 2017-07-08 RX ADMIN — Medication 250 MG: at 08:37

## 2017-07-08 RX ADMIN — DULOXETINE HYDROCHLORIDE 60 MG: 60 CAPSULE, DELAYED RELEASE ORAL at 08:36

## 2017-07-08 RX ADMIN — CEFAZOLIN 1 G: 1 INJECTION, POWDER, FOR SOLUTION INTRAMUSCULAR; INTRAVENOUS at 20:39

## 2017-07-08 RX ADMIN — LOSARTAN POTASSIUM 25 MG: 25 TABLET, FILM COATED ORAL at 09:00

## 2017-07-08 RX ADMIN — HEPARIN SODIUM 5000 UNITS: 5000 INJECTION, SOLUTION INTRAVENOUS; SUBCUTANEOUS at 06:09

## 2017-07-08 RX ADMIN — DOCUSATE SODIUM 100 MG: 100 CAPSULE, LIQUID FILLED ORAL at 08:36

## 2017-07-08 RX ADMIN — OXYCODONE AND ACETAMINOPHEN 2 TABLET: 5; 325 TABLET ORAL at 08:31

## 2017-07-08 RX ADMIN — OXYCODONE AND ACETAMINOPHEN 2 TABLET: 5; 325 TABLET ORAL at 02:45

## 2017-07-08 RX ADMIN — Medication 2 MG: at 02:20

## 2017-07-08 RX ADMIN — CEFAZOLIN 1 G: 1 INJECTION, POWDER, FOR SOLUTION INTRAMUSCULAR; INTRAVENOUS at 06:07

## 2017-07-08 RX ADMIN — Medication 10 ML: at 23:10

## 2017-07-08 RX ADMIN — DILTIAZEM HYDROCHLORIDE 30 MG: 30 TABLET, FILM COATED ORAL at 07:30

## 2017-07-08 RX ADMIN — CEFAZOLIN 1 G: 1 INJECTION, POWDER, FOR SOLUTION INTRAMUSCULAR; INTRAVENOUS at 14:32

## 2017-07-08 RX ADMIN — HEPARIN SODIUM 5000 UNITS: 5000 INJECTION, SOLUTION INTRAVENOUS; SUBCUTANEOUS at 20:42

## 2017-07-08 RX ADMIN — METOPROLOL TARTRATE 12.5 MG: 25 TABLET ORAL at 08:37

## 2017-07-08 RX ADMIN — GABAPENTIN 100 MG: 100 CAPSULE ORAL at 08:36

## 2017-07-08 RX ADMIN — OXYCODONE AND ACETAMINOPHEN 2 TABLET: 5; 325 TABLET ORAL at 18:51

## 2017-07-08 NOTE — PROGRESS NOTES
Changed wound vac and lowered settings to low continuous  No active bleed, no sign of infection  Check labs  Cont current care

## 2017-07-08 NOTE — ROUTINE PROCESS
0700-Bedside shift change report given to Yadira Stark, RN (oncoming nurse) by Toro Ruano RN (offgoing nurse). Report included the following information SBAR, Kardex and MAR. 1030- Wound vac dressing changed, Patient tolerated well. Will continue to monitor. 1100- Called to room. Patient bleeding from incision site, pressure held to site and Dr. Arnulfo Matthew at bedside orders given for stat labs and wound vac therapy decreased to 75. Will continue to monitor. 1700- Drsg changed to Rt. Leg, Patient tolerated well. 1900-Bedside shift change report given to [address] [place] [address] Champ Dasilva (oncoming nurse) by Yadira Stark RN (offgoing nurse). Report included the following information SBAR, Kardex and MAR.

## 2017-07-09 PROCEDURE — 65660000004 HC RM CVT STEPDOWN

## 2017-07-09 PROCEDURE — 74011250636 HC RX REV CODE- 250/636: Performed by: SURGERY

## 2017-07-09 PROCEDURE — 74011250637 HC RX REV CODE- 250/637: Performed by: SURGERY

## 2017-07-09 PROCEDURE — 74011000258 HC RX REV CODE- 258: Performed by: SURGERY

## 2017-07-09 RX ADMIN — Medication 250 MG: at 08:43

## 2017-07-09 RX ADMIN — AMIODARONE HYDROCHLORIDE 200 MG: 200 TABLET ORAL at 17:21

## 2017-07-09 RX ADMIN — HEPARIN SODIUM 5000 UNITS: 5000 INJECTION, SOLUTION INTRAVENOUS; SUBCUTANEOUS at 13:29

## 2017-07-09 RX ADMIN — METOPROLOL TARTRATE 12.5 MG: 25 TABLET ORAL at 08:43

## 2017-07-09 RX ADMIN — CYANOCOBALAMIN TAB 1000 MCG 1000 MCG: 1000 TAB at 08:42

## 2017-07-09 RX ADMIN — CLOPIDOGREL 75 MG: 75 TABLET, FILM COATED ORAL at 17:21

## 2017-07-09 RX ADMIN — DULOXETINE HYDROCHLORIDE 60 MG: 60 CAPSULE, DELAYED RELEASE ORAL at 08:43

## 2017-07-09 RX ADMIN — DILTIAZEM HYDROCHLORIDE 30 MG: 30 TABLET, FILM COATED ORAL at 13:05

## 2017-07-09 RX ADMIN — DILTIAZEM HYDROCHLORIDE 30 MG: 30 TABLET, FILM COATED ORAL at 17:21

## 2017-07-09 RX ADMIN — CEFAZOLIN 1 G: 1 INJECTION, POWDER, FOR SOLUTION INTRAMUSCULAR; INTRAVENOUS at 13:30

## 2017-07-09 RX ADMIN — ATORVASTATIN CALCIUM 40 MG: 40 TABLET, FILM COATED ORAL at 20:38

## 2017-07-09 RX ADMIN — ASPIRIN 81 MG CHEWABLE TABLET 81 MG: 81 TABLET CHEWABLE at 08:42

## 2017-07-09 RX ADMIN — DILTIAZEM HYDROCHLORIDE 30 MG: 30 TABLET, FILM COATED ORAL at 08:42

## 2017-07-09 RX ADMIN — Medication 10 ML: at 21:25

## 2017-07-09 RX ADMIN — OXYCODONE AND ACETAMINOPHEN 2 TABLET: 5; 325 TABLET ORAL at 13:05

## 2017-07-09 RX ADMIN — AMIODARONE HYDROCHLORIDE 200 MG: 200 TABLET ORAL at 08:42

## 2017-07-09 RX ADMIN — HEPARIN SODIUM 5000 UNITS: 5000 INJECTION, SOLUTION INTRAVENOUS; SUBCUTANEOUS at 20:38

## 2017-07-09 RX ADMIN — Medication 10 ML: at 06:00

## 2017-07-09 RX ADMIN — OXYCODONE AND ACETAMINOPHEN 2 TABLET: 5; 325 TABLET ORAL at 21:25

## 2017-07-09 RX ADMIN — OXYCODONE AND ACETAMINOPHEN 2 TABLET: 5; 325 TABLET ORAL at 17:27

## 2017-07-09 RX ADMIN — LOSARTAN POTASSIUM 25 MG: 25 TABLET, FILM COATED ORAL at 08:43

## 2017-07-09 RX ADMIN — Medication 10 ML: at 14:00

## 2017-07-09 RX ADMIN — GABAPENTIN 100 MG: 100 CAPSULE ORAL at 08:42

## 2017-07-09 RX ADMIN — METOPROLOL TARTRATE 12.5 MG: 25 TABLET ORAL at 20:38

## 2017-07-09 RX ADMIN — FERROUS SULFATE TAB 325 MG (65 MG ELEMENTAL FE) 325 MG: 325 (65 FE) TAB at 08:43

## 2017-07-09 RX ADMIN — GABAPENTIN 100 MG: 100 CAPSULE ORAL at 17:21

## 2017-07-09 RX ADMIN — DILTIAZEM HYDROCHLORIDE 30 MG: 30 TABLET, FILM COATED ORAL at 21:25

## 2017-07-09 RX ADMIN — CEFAZOLIN 1 G: 1 INJECTION, POWDER, FOR SOLUTION INTRAMUSCULAR; INTRAVENOUS at 20:38

## 2017-07-09 RX ADMIN — OXYCODONE AND ACETAMINOPHEN 2 TABLET: 5; 325 TABLET ORAL at 08:42

## 2017-07-09 RX ADMIN — CEFAZOLIN 1 G: 1 INJECTION, POWDER, FOR SOLUTION INTRAMUSCULAR; INTRAVENOUS at 04:53

## 2017-07-09 RX ADMIN — OXYCODONE AND ACETAMINOPHEN 2 TABLET: 5; 325 TABLET ORAL at 04:55

## 2017-07-09 RX ADMIN — DOCUSATE SODIUM 100 MG: 100 CAPSULE, LIQUID FILLED ORAL at 08:42

## 2017-07-09 RX ADMIN — VITAMIN D, TAB 1000IU (100/BT) 1000 UNITS: 25 TAB at 08:42

## 2017-07-09 NOTE — PROGRESS NOTES
0448  Pt awoke with pain, given his med, urinal emptied and gave him a warm pack of bath wipes and a new gown with towel and wash cloths. He has been doing his own bathing.

## 2017-07-09 NOTE — PROGRESS NOTES
Wound vac changed yesterday. No further bleeding. No active bleed, no sign of infection  VSS. Labs reviewed. H&H stable. Cont current care. Plan for wound vac change tomorrow.

## 2017-07-10 PROCEDURE — 74011250636 HC RX REV CODE- 250/636: Performed by: SURGERY

## 2017-07-10 PROCEDURE — 97116 GAIT TRAINING THERAPY: CPT

## 2017-07-10 PROCEDURE — 97161 PT EVAL LOW COMPLEX 20 MIN: CPT

## 2017-07-10 PROCEDURE — 65660000004 HC RM CVT STEPDOWN

## 2017-07-10 PROCEDURE — 97165 OT EVAL LOW COMPLEX 30 MIN: CPT

## 2017-07-10 PROCEDURE — 74011250637 HC RX REV CODE- 250/637: Performed by: SURGERY

## 2017-07-10 PROCEDURE — 74011000258 HC RX REV CODE- 258: Performed by: SURGERY

## 2017-07-10 PROCEDURE — 97535 SELF CARE MNGMENT TRAINING: CPT

## 2017-07-10 RX ORDER — OXYCODONE AND ACETAMINOPHEN 5; 325 MG/1; MG/1
2 TABLET ORAL
Qty: 60 TAB | Refills: 0 | Status: SHIPPED | OUTPATIENT
Start: 2017-07-10 | End: 2017-08-07

## 2017-07-10 RX ADMIN — CEFAZOLIN 1 G: 1 INJECTION, POWDER, FOR SOLUTION INTRAMUSCULAR; INTRAVENOUS at 14:34

## 2017-07-10 RX ADMIN — VITAMIN D, TAB 1000IU (100/BT) 1000 UNITS: 25 TAB at 08:37

## 2017-07-10 RX ADMIN — DILTIAZEM HYDROCHLORIDE 30 MG: 30 TABLET, FILM COATED ORAL at 11:26

## 2017-07-10 RX ADMIN — AMIODARONE HYDROCHLORIDE 200 MG: 200 TABLET ORAL at 17:37

## 2017-07-10 RX ADMIN — HEPARIN SODIUM 5000 UNITS: 5000 INJECTION, SOLUTION INTRAVENOUS; SUBCUTANEOUS at 14:34

## 2017-07-10 RX ADMIN — CYANOCOBALAMIN TAB 1000 MCG 1000 MCG: 1000 TAB at 08:37

## 2017-07-10 RX ADMIN — HEPARIN SODIUM 5000 UNITS: 5000 INJECTION, SOLUTION INTRAVENOUS; SUBCUTANEOUS at 05:06

## 2017-07-10 RX ADMIN — METOPROLOL TARTRATE 12.5 MG: 25 TABLET ORAL at 21:01

## 2017-07-10 RX ADMIN — DILTIAZEM HYDROCHLORIDE 30 MG: 30 TABLET, FILM COATED ORAL at 16:37

## 2017-07-10 RX ADMIN — DOCUSATE SODIUM 100 MG: 100 CAPSULE, LIQUID FILLED ORAL at 08:37

## 2017-07-10 RX ADMIN — CEFAZOLIN 1 G: 1 INJECTION, POWDER, FOR SOLUTION INTRAMUSCULAR; INTRAVENOUS at 05:06

## 2017-07-10 RX ADMIN — OXYCODONE AND ACETAMINOPHEN 2 TABLET: 5; 325 TABLET ORAL at 11:27

## 2017-07-10 RX ADMIN — DOCUSATE SODIUM 100 MG: 100 CAPSULE, LIQUID FILLED ORAL at 17:35

## 2017-07-10 RX ADMIN — OXYCODONE AND ACETAMINOPHEN 2 TABLET: 5; 325 TABLET ORAL at 21:02

## 2017-07-10 RX ADMIN — AMIODARONE HYDROCHLORIDE 200 MG: 200 TABLET ORAL at 08:38

## 2017-07-10 RX ADMIN — GABAPENTIN 100 MG: 100 CAPSULE ORAL at 08:37

## 2017-07-10 RX ADMIN — ASPIRIN 81 MG CHEWABLE TABLET 81 MG: 81 TABLET CHEWABLE at 08:37

## 2017-07-10 RX ADMIN — GABAPENTIN 100 MG: 100 CAPSULE ORAL at 17:37

## 2017-07-10 RX ADMIN — DILTIAZEM HYDROCHLORIDE 30 MG: 30 TABLET, FILM COATED ORAL at 22:56

## 2017-07-10 RX ADMIN — Medication 10 ML: at 14:34

## 2017-07-10 RX ADMIN — HEPARIN SODIUM 5000 UNITS: 5000 INJECTION, SOLUTION INTRAVENOUS; SUBCUTANEOUS at 21:10

## 2017-07-10 RX ADMIN — FERROUS SULFATE TAB 325 MG (65 MG ELEMENTAL FE) 325 MG: 325 (65 FE) TAB at 08:37

## 2017-07-10 RX ADMIN — DILTIAZEM HYDROCHLORIDE 30 MG: 30 TABLET, FILM COATED ORAL at 08:38

## 2017-07-10 RX ADMIN — DULOXETINE HYDROCHLORIDE 60 MG: 60 CAPSULE, DELAYED RELEASE ORAL at 08:38

## 2017-07-10 RX ADMIN — METOPROLOL TARTRATE 12.5 MG: 25 TABLET ORAL at 08:38

## 2017-07-10 RX ADMIN — CLOPIDOGREL 75 MG: 75 TABLET, FILM COATED ORAL at 16:36

## 2017-07-10 RX ADMIN — CEFAZOLIN 1 G: 1 INJECTION, POWDER, FOR SOLUTION INTRAMUSCULAR; INTRAVENOUS at 21:04

## 2017-07-10 RX ADMIN — Medication 250 MG: at 08:37

## 2017-07-10 RX ADMIN — OXYCODONE AND ACETAMINOPHEN 2 TABLET: 5; 325 TABLET ORAL at 05:46

## 2017-07-10 RX ADMIN — Medication 10 ML: at 05:07

## 2017-07-10 RX ADMIN — Medication 10 ML: at 21:11

## 2017-07-10 RX ADMIN — LOSARTAN POTASSIUM 25 MG: 25 TABLET, FILM COATED ORAL at 08:37

## 2017-07-10 RX ADMIN — ATORVASTATIN CALCIUM 40 MG: 40 TABLET, FILM COATED ORAL at 21:01

## 2017-07-10 RX ADMIN — OXYCODONE AND ACETAMINOPHEN 2 TABLET: 5; 325 TABLET ORAL at 16:37

## 2017-07-10 RX ADMIN — OXYCODONE AND ACETAMINOPHEN 2 TABLET: 5; 325 TABLET ORAL at 01:59

## 2017-07-10 NOTE — ROUTINE PROCESS
Bedside shift change report given to Chloe Gongora RN   (oncoming nurse) by Lillian Petit RN   (offgoing nurse). Report included the following information SBAR, Kardex, ED Summary and OR Summary     .

## 2017-07-10 NOTE — PROGRESS NOTES
Problem: Mobility Impaired (Adult and Pediatric)  Goal: *Acute Goals and Plan of Care (Insert Text)  STGs to be addressed within 3 days:  1. Activity tolerance: Tolerate up in chair 1-2 hrs for ADLs. 2. Transfers: Sit to stand to chair modified independent with LRAD for ADLs. LTGs to be addressed within 7 days:  1. Ambulation: Ambulate > 250 ft modified independent with LRAD for home mobility. 2. Patient Education: Independent with HEP for home safety. 3. Stairs: Up/Down 5 steps CGA with HR for home entry. Outcome: Progressing Towards Goal  PHYSICAL THERAPY EVALUATION     Patient: Jase Garcia (43 y.o. male)  Date: 7/10/2017  Primary Diagnosis: Ruptured abdominal aortic aneurysm (AAA) (HCC) [I71.3]  Procedure(s) (LRB):  EXPLORATION OF LEFT FEMORAL ARTERY/ possible BYPASS/ possible  LEFT AMPUTATION KNEE(AKA) (Left) 6 Days Post-Op   Precautions:  Fall, WBAT (LLE, wound vac L groin)      ASSESSMENT :  Based on the objective data described below, the patient presents to PT s/p fem-fem bypass with decreased functional mobility with regard to transfers, gait, and overall tolerance for activity. Patient with wound vac to L groin. Patient demonstrated modified independence with bed mobility, transitioned into standing supervision with RW, able to ambulate ~ 175 ft SBA with RW, no LOB, no increased c/o pain with movements of L LE. Patient returned back to room, set up in chair at bedside. Encouraged patient to increase OOB time. Patient would benefit from PT to address above impairments and assist with discharge planning. Patient will benefit from skilled intervention to address the above impairments.   Patients rehabilitation potential is considered to be Good  Factors which may influence rehabilitation potential include:   [X]         None noted  [ ]         Mental ability/status  [ ]         Medical condition  [ ]         Home/family situation and support systems  [ ]         Safety awareness  [ ] Pain tolerance/management  [ ]         Other:        PLAN :  Recommendations and Planned Interventions:  [X]           Bed Mobility Training             [X]    Neuromuscular Re-Education  [X]           Transfer Training                   [ ]    Orthotic/Prosthetic Training  [X]           Gait Training                          [ ]    Modalities  [X]           Therapeutic Exercises          [ ]    Edema Management/Control  [X]           Therapeutic Activities            [X]    Patient and Family Training/Education  [ ]           Other (comment):     Frequency/Duration: Patient will be followed by physical therapy daily x 4-7 x week to address goals. Discharge Recommendations: Home Health  Further Equipment Recommendations for Discharge: rolling walker       SUBJECTIVE:   Patient stated I am doing pretty good.       OBJECTIVE DATA SUMMARY:       Past Medical History:   Diagnosis Date    Acute blood loss as cause of postoperative anemia 4/4/2017    Anticoagulated on Coumadin      Aortoiliac occlusive disease (HCC) 1/25/2017    Atherosclerosis of native artery of both lower extremities with intermittent claudication ([address] 1/25/2017    Benign hypertensive heart disease with systolic congestive heart failure, NYHA class 2 ([address] 1/26/2015    Carotid artery disease (HCC)      Chronic atrial fibrillation (HCC)      Chronic systolic heart failure (HCC)      Chronic ulcer of right foot ([address] 4/4/2017    Coronary artery disease involving native coronary artery of native heart 3/15/2017     Successful stenting of Cx (Xience LESLEY) and RCA (Xience LESLEY) to 0% by Dr. Odell Thibodeaux on 3/15/17.     DDD (degenerative disc disease), lumbar 1/26/2015    Dyslipidemia      Erectile dysfunction 7/5/2016    Euthyroid sick syndrome 4/6/2017    Hereditary peripheral neuropathy 11/15/2016    History of cardioversion 4/18/2017     S/P Synchronized external cardioversion (4/18/2017 - Dr. Lobo Murrieta)    Hypertension      Hyperuricemia      Ischemic cardiomyopathy      Peripheral artery disease ([facility] 1/25/2017    Spinal stenosis of lumbar region with radiculopathy 5/4/2015     Dr. Lyn Sousa Vitamin D deficiency 4/22/2017     Vitamin D 25-Hydroxy (4/22/2017) = 12.0     Past Surgical History:   Procedure Laterality Date    CARDIAC CATHETERIZATION   3/8/2017          CARDIAC CATHETERIZATION   3/15/2017          CORONARY STENT SINGLE W/PTCA   3/15/2017          HX COLONOSCOPY   07/23/2015     polyps repeat 2020 5 years [facility]    [address] Right 04/04/2017     S/P Right axillary to bifemoral artery bypass using an 8 mm Propaten graft from the right axilla to the right common femoral artery with a jump femoral-femoral bypass with another 8 mm Propaten external ring bypass; Right common femoral artery, and profunda femoris artery and superficial femoral artery endarterectomy causing an extensive surgery on the right side (4/4/2017 - Dr. Suzanna Golden)    [address] Right 04/07/2017     S/P Cutdown of femoral-femoral bypass, more on the left groin side; Right lower extremity angiography with first-order catheterization (4/7/2017 - Dr. Lisa Orozco)    HX FEMORAL BYPASS Right 04/10/2017     S/P Right femoral to above-knee popliteal bypass with an 8 mm PTFE graft (4/10/2017 - Dr. Dyllan Hanson)     Barriers to Learning/Limitations: None  Compensate with: N/A  Prior Level of Function/Home Situation:   Home Situation  Home Environment: Private residence  # Steps to Enter: 2  One/Two Story Residence: One story  Living Alone: No  Support Systems: Family member(s)  Patient Expects to be Discharged to[de-identified] Private residence  Current DME Used/Available at Home: addis Brush, [address] [facility] [address] chair, Adaptive dressing aides  Tub or Shower Type: Shower  Critical Behavior:  Neurologic State: Alert  Psychosocial  Patient Behaviors: Calm; Cooperative  Family  Behaviors: Cooperative  Purposeful Interaction: Yes  Pt Identified Daily Priority: Clinical issues (comment)  Atiya Process: Nurture loving kindness;Enable dl/hope; Teaching/learning; Attend basic human needs;Create healing environment;Supportive expression  Caring Interventions: Reassure  Reassure: Therapeutic listening; Informing; Acceptance;Quiet presence;Caring rounds  Therapeutic Modalities: Intentional therapeutic touch  Strength:    Strength: Generally decreased, functional (B LE)  Tone & Sensation:   Tone: Normal (B LE)  Sensation: Intact (B LE intact to LT)   Range Of Motion:  AROM: Generally decreased, functional (B LE)  Functional Mobility:  Bed Mobility:  Rolling: Modified independent  Supine to Sit: Modified independent  Scooting: Modified independent  Transfers:  Sit to Stand: Supervision  Stand to Sit: Supervision  Balance:   Sitting: Intact  Standing: Intact; With support (with RW)  Ambulation/Gait Training:  Distance (ft): 175 Feet (ft)  Assistive Device: Walker, rolling  Ambulation - Level of Assistance: Stand-by asssistance  Left Side Weight Bearing: As tolerated  Base of Support: Narrowed  Speed/Suzanna: Accelerated  Interventions: Verbal cues  Stairs:  Stairs - Level of Assistance:  (N/A)  Pain:  Pain Scale 1: Numeric (0 - 10)  Pain Intensity 1: 3  Pain Location 1: Leg  Pain Orientation 1: Left; Lower  Pain Description 1: Aching; Sore  Activity Tolerance:   Good  Please refer to the flowsheet for vital signs taken during this treatment. After treatment:   [X] Patient left in no apparent distress sitting up in chair  [ ] Patient left sitting on EOB  [ ] Patient left in no apparent distress in bed  [ ] Patient declined to be OOB at this time due to   [X] Call bell left within reach  [ ] Nursing notified  [ ] Caregiver present  [ ] Bed alarm activated      COMMUNICATION/EDUCATION:   [X]         Fall prevention education was provided and the patient/caregiver indicated understanding.   [X]         Patient/family have participated as able in goal setting and plan of care. [X]         Patient/family agree to work toward stated goals and plan of care. [ ]         Patient understands intent and goals of therapy, but is neutral about his/her participation. [ ]         Patient is unable to participate in goal setting and plan of care. Thank you for this referral.  Tiera Kay, PT   Time Calculation: 33 mins      G-codes: Mobility  Current  CI= 1-19%   Goal  CI= 1-19%. The severity rating is based on the Level of Assistance required for Functional Mobility and ADLs.      Eval Complexity: History: LOW Complexity : Zero comorbidities / personal factors that will impact the outcome / POCExam:LOW Complexity : 1-2 Standardized tests and measures addressing body structure, function, activity limitation and / or participation in recreation  Presentation: LOW Complexity : Stable, uncomplicated  Overall Complexity:LOW

## 2017-07-10 NOTE — PROGRESS NOTES
Problem: Self Care Deficits Care Plan (Adult)  Goal: *Acute Goals and Plan of Care (Insert Text)  Outcome: Resolved/Met Date Met:  07/10/17  OCCUPATIONAL THERAPY EVALUATION/DISCHARGE     Patient: Carlyn Nuñez (62 y.o. male)  Date: 7/10/2017  Primary Diagnosis: Ruptured abdominal aortic aneurysm (AAA) (HCC) [I71.3]  Procedure(s) (LRB):  EXPLORATION OF LEFT FEMORAL ARTERY/ possible BYPASS/ possible  LEFT AMPUTATION KNEE(AKA) (Left) 6 Days Post-Op   Precautions:  Fall, WBAT (LLE)      ASSESSMENT AND RECOMMENDATIONS:  Based on the objective data described below, the patient in bed upon arrival. Patient was modified independent with bed mobility with intact sitting balance on EOB. Patient was able to perform functional mobility/simulated toilet transfer and self care tasks with supervision with rolling walker. Patient reported he has a reacher for LE self care tasks; educated patient on how to use it for donning/doffing pants. Educated patient on use of shower chair(once cleared by vascular)/seated sponge baths for safety; patient reported understanding. Patient assisted to chair and left comfortable in no distress. Patient deferred to PT for further mobility. Skilled acute care occupational therapy is not indicated at this time. Discharge Recommendations: Home Health  Further Equipment Recommendations for Discharge: N/A       COMPLEXITY      Eval Complexity: History: LOW Complexity : Brief history review ; Examination: LOW Complexity : 1-3 performance deficits relating to physical, cognitive , or psychosocial skils that result in activity limitations and / or participation restrictions ; Decision Making:LOW Complexity : No comorbidities that affect functional and no verbal or physical assistance needed to complete eval tasks  Assessment: Low Complexity          G-CODES:      Self Care  Current  CI= 1-19%   Goal  CI= 1-19%   D/C  CI= 1-19%.   The severity rating is based on the Level of Assistance required for Functional Mobility and ADLs. SUBJECTIVE:   Patient stated I have a reacher.       OBJECTIVE DATA SUMMARY:       Past Medical History:   Diagnosis Date    Acute blood loss as cause of postoperative anemia 4/4/2017    Anticoagulated on Coumadin      Aortoiliac occlusive disease (HCC) 1/25/2017    Atherosclerosis of native artery of both lower extremities with intermittent claudication ([address] 1/25/2017    Benign hypertensive heart disease with systolic congestive heart failure, NYHA class 2 ([address] 1/26/2015    Carotid artery disease (HCC)      Chronic atrial fibrillation (HCC)      Chronic systolic heart failure (HCC)      Chronic ulcer of right foot ([address] 4/4/2017    Coronary artery disease involving native coronary artery of native heart 3/15/2017     Successful stenting of Cx (Xience LESLEY) and RCA (Xience LESLEY) to 0% by Dr. Mynor Oconnor on 3/15/17.     DDD (degenerative disc disease), lumbar 1/26/2015    Dyslipidemia      Erectile dysfunction 7/5/2016    Euthyroid sick syndrome 4/6/2017    Hereditary peripheral neuropathy 11/15/2016    History of cardioversion 4/18/2017     S/P Synchronized external cardioversion (4/18/2017 - Dr. Andrew Sahu)    Hypertension      Hyperuricemia      Ischemic cardiomyopathy      Peripheral artery disease ([facility] 1/25/2017    Spinal stenosis of lumbar region with radiculopathy 5/4/2015     Dr. Lilia Sears Vitamin D deficiency 4/22/2017     Vitamin D 25-Hydroxy (4/22/2017) = 12.0     Past Surgical History:   Procedure Laterality Date    CARDIAC CATHETERIZATION   3/8/2017          CARDIAC CATHETERIZATION   3/15/2017          CORONARY STENT SINGLE W/PTCA   3/15/2017          HX COLONOSCOPY   07/23/2015     polyps repeat 2020 5 years [facility]    HX FEMORAL BYPASS Right 04/04/2017     S/P Right axillary to bifemoral artery bypass using an 8 mm Propaten graft from the right axilla to the right common femoral artery with a jump femoral-femoral bypass with another 8 mm Propaten external ring bypass; Right common femoral artery, and profunda femoris artery and superficial femoral artery endarterectomy causing an extensive surgery on the right side (4/4/2017 - Dr. Maryanne Ayala)   [address] Right 04/07/2017     S/P Cutdown of femoral-femoral bypass, more on the left groin side; Right lower extremity angiography with first-order catheterization (4/7/2017 - Dr. Demarcus Colvin)    HX FEMORAL BYPASS Right 04/10/2017     S/P Right femoral to above-knee popliteal bypass with an 8 mm PTFE graft (4/10/2017 - Dr. Kenyatta Lackey)     Prior Level of Function/Home Situation: Patient reported he was independent in basic self care tasks and functional mobility PTA. Home Situation  Home Environment: Private residence  # Steps to Enter: 2  One/Two Story Residence: One story  Living Alone: No  Support Systems: Family member(s)  Patient Expects to be Discharged to[de-identified] Private residence  Current DME Used/Available at Home: addis Bergeron, [facility] chair, [employer] dressing aides  Tub or Shower Type: Shower  [X]     Right hand dominant       [ ]     Left hand dominant  Cognitive/Behavioral Status:  Neurologic State: Alert  Orientation Level: Oriented X4  Cognition: Follows commands     Skin: No skin changes noted     Edema: No edema noted     Vision/Perceptual:       Acuity: Within Defined Limits       Coordination:  Coordination: Within functional limits (BUEs)       Balance:   Sitting: Intact  Standing: Intact (with rolling walker)     Strength:  Strength:  Within functional limits (BUEs, limited R triceps strength)     Tone & Sensation:  Tone: Normal (BUEs)  Sensation: Intact (BUEs)     Range of Motion:  AROM: Within functional limits (BUEs)     Functional Mobility and Transfers for ADLs:  Bed Mobility:  Rolling: Modified independent  Supine to Sit: Modified independent  Scooting: Modified independent  Transfers:  Sit to Stand: Supervision              Toilet Transfer : Supervision (simulated with rolling walker)                 ADL Assessment:(clinical judgement based on functional mobility)  Feeding: Independent     Oral Facial Hygiene/Grooming: Supervision     Bathing: Supervision     Upper Body Dressing: Independent     Lower Body Dressing: Supervision     Toileting: Supervision     Pain:  Pt reports 0/10 pain or discomfort prior to treatment. Pt reports 0/10 pain or discomfort post treatment. Activity Tolerance:   Good     Please refer to the flowsheet for vital signs taken during this treatment. After treatment:   [X]  Patient left in no apparent distress sitting up in chair  [ ]  Patient left in no apparent distress in bed  [X]  Call bell left within reach  [ ]  Nursing notified  [X]  Caregiver present  [ ]  Bed alarm activated      COMMUNICATION/EDUCATION:   Communication/Collaboration:  [X]      Home safety education was provided and the patient/caregiver indicated understanding. [X]      Patient/family have participated as able and agree with findings and recommendations. [ ]      Patient is unable to participate in plan of care at this time.      Verdell Boast, OTR/L  Time Calculation: 24 mins

## 2017-07-10 NOTE — PROGRESS NOTES
Pt doing well this AM. In good spirits. No complaints. No new AM labs. VSS  No active bleeding. Wound vac scheduled for change today. Mobilize. PT/OT. If continues to do well home tomorrow with Latesha Figueroa.      Elvia Harris  [phone]

## 2017-07-10 NOTE — PROGRESS NOTES
NUTRITION    Nutrition Screen     RECOMMENDATIONS / PLAN:     - Continue current nutrition interventions. - Continue RD inpatient monitoring and evaluation. NUTRITION INTERVENTIONS & DIAGNOSIS:     [x] Meals/Snacks: modified diet  [x] Medical food supplementation: Ensure BID     Nutrition Diagnosis:   1. Unintentional weight loss related to inadequate energy intake as evidenced by patient reported weight loss of 5% in the past few weeks, patient reports pants are looser and belt is on the last notch. 2. Increased nutrient needs (energy/protein) related wound healing and weight loss as evidenced by pt with multiple surgical wounds and significant weight loss of 5% x past few weeks. ASSESSMENT:     7/10: Good meal and supplement intake. Tolerating diet. No concerns. 7/3: Tolerating diet. Discussed need to start supplements-patient agreeable, states he takes Ensure at home, prefers chocolate. Average po intake adequate to meet patients estimated nutritional needs:   [] Yes     [x] No   [] Unable to determine at this time    Diet: DIET CARDIAC Regular  DIET NUTRITIONAL SUPPLEMENTS Dinner, Breakfast; ENSURE ENLIVE      Food Allergies:NKFA   Current Appetite:   [x] Good   [] Fair     [] Poor     [] Other:  Appetite/meal intake prior to admission:   [] Good     [x] Fair     [] Poor     [] Other:  Feeding Limitations:  [] Swallowing difficulty    [] Chewing difficulty    [] Other:  Patient reports if he has issues chewing will put in teeth. Current Meal Intake: Patient Vitals for the past 100 hrs:   % Diet Eaten   07/10/17 1323 80 %   07/10/17 0859 95 %   07/09/17 1739 98 %   07/09/17 1241 100 %   07/09/17 0855 100 %   07/08/17 1745 94 %   07/08/17 1317 93 %   07/08/17 0912 98 %   07/07/17 1756 97 %   07/07/17 1413 65 %   07/07/17 0928 100 %       BM:  7/10  Skin Integrity: Left groin, R chest wall abscess, vascular wounds R ankle, R heel. Edema: 2+ LUE, 1+ LEs  Pertinent Medications: Reviewed. Noted zinc since 6/2/17. Recent Labs      07/08/17   1140   NA  134*   K  4.5   CL  96*   CO2  30   GLU  95   BUN  9   CREA  0.65   CA  9.0       Intake/Output Summary (Last 24 hours) at 07/10/17 1650  Last data filed at 07/10/17 1323   Gross per 24 hour   Intake             1310 ml   Output             2560 ml   Net            -1250 ml       Anthropometrics:  Ht Readings from Last 1 Encounters:   06/23/17 5' 10\" (1.778 m)     Last 3 Recorded Weights in this Encounter    07/08/17 0400 07/09/17 0706 07/10/17 0357   Weight: 72.2 kg (159 lb 3.2 oz) 72.2 kg (159 lb 2.8 oz) 72.4 kg (159 lb 9.8 oz)     Body mass index is 22.9 kg/(m^2). Weight History: Patient reports weight of 165# a few weeks ago. -8#, 5%  In <1 month. Weight Metrics 7/10/2017 6/27/2017 6/22/2017 6/19/2017 6/15/2017 6/7/2017 6/2/2017   Weight 159 lb 9.8 oz - 165 lb 160 lb 160 lb 160 lb 160 lb   BMI - 22.9 kg/m2 23.68 kg/m2 22.96 kg/m2 22.96 kg/m2 22.96 kg/m2 22.96 kg/m2        Admitting Diagnosis: Ruptured abdominal aortic aneurysm (AAA) (HCC) [I71.3]   Left groin bleed s/p exploration and washout. R chest wall abscess  Pertinent PMHx: history of vascular surgery, chronic ulcer of R foot, dyslipidemia, peripheral neuropathy, peripheral artery disease, afib, benign heart disease with systolic congestive heart failure. Education Needs:        [x] None identified  [] Identified - Not appropriate at this time  []  Identified and addressed - refer to education log  Learning Limitations:   [x] None identified  [] Identified    Cultural, Shinto & ethnic food preferences:  [x] None identified    [] Identified and addressed     ESTIMATED NUTRITION NEEDS:     Calories: 6438-5646 kcal (35-40 kcal/kg) based on  [x] Actual BW      [] IBW   Protein: 110-145 gm (1.5-2.0 gm/kg) based on  [] Actual BW      [] IBW   Fluid: 1 mL/kcal     MONITORING & EVALUATION:     Nutrition Goal(s):   1.  Po intake of meals will meet >75% of patient estimated nutritional needs within the next 7 days. Outcome:  [x] Met/Ongoing    []  Not Met    [] New/Initial Goal     Monitoring:   [x] Diet tolerance   [x] Meal intake   [x] Supplement intake   [] GI symptoms/ability to tolerate po diet   [] Respiratory status   [] Plan of care      Discharge Planning: Continue cardiac diet. Continue ensure/supplement consumption when appetite is variable.     [x] Participated in care planning, discharge planning, & interdisciplinary rounds as appropriate    Anita Ward RD   Pager: [phone]

## 2017-07-11 LAB
BASOPHILS # BLD AUTO: 0 K/UL (ref 0–0.06)
BASOPHILS # BLD: 0 % (ref 0–2)
DIFFERENTIAL METHOD BLD: ABNORMAL
EOSINOPHIL # BLD: 0.5 K/UL (ref 0–0.4)
EOSINOPHIL NFR BLD: 4 % (ref 0–5)
ERYTHROCYTE [DISTWIDTH] IN BLOOD BY AUTOMATED COUNT: 16.3 % (ref 11.6–14.5)
HCT VFR BLD AUTO: 31.4 % (ref 36–48)
HGB BLD-MCNC: 10.2 G/DL (ref 13–16)
LYMPHOCYTES # BLD AUTO: 12 % (ref 21–52)
LYMPHOCYTES # BLD: 1.6 K/UL (ref 0.9–3.6)
MCH RBC QN AUTO: 28.4 PG (ref 24–34)
MCHC RBC AUTO-ENTMCNC: 32.5 G/DL (ref 31–37)
MCV RBC AUTO: 87.5 FL (ref 74–97)
MONOCYTES # BLD: 1.3 K/UL (ref 0.05–1.2)
MONOCYTES NFR BLD AUTO: 10 % (ref 3–10)
NEUTS SEG # BLD: 10.1 K/UL (ref 1.8–8)
NEUTS SEG NFR BLD AUTO: 74 % (ref 40–73)
PLATELET # BLD AUTO: 704 K/UL (ref 135–420)
PMV BLD AUTO: 9.2 FL (ref 9.2–11.8)
RBC # BLD AUTO: 3.59 M/UL (ref 4.7–5.5)
WBC # BLD AUTO: 13.5 K/UL (ref 4.6–13.2)

## 2017-07-11 PROCEDURE — 85025 COMPLETE CBC W/AUTO DIFF WBC: CPT | Performed by: PHYSICIAN ASSISTANT

## 2017-07-11 PROCEDURE — 74011000258 HC RX REV CODE- 258: Performed by: SURGERY

## 2017-07-11 PROCEDURE — 97116 GAIT TRAINING THERAPY: CPT

## 2017-07-11 PROCEDURE — 74011250636 HC RX REV CODE- 250/636: Performed by: SURGERY

## 2017-07-11 PROCEDURE — 97530 THERAPEUTIC ACTIVITIES: CPT

## 2017-07-11 PROCEDURE — 74011250637 HC RX REV CODE- 250/637: Performed by: SURGERY

## 2017-07-11 PROCEDURE — 65660000004 HC RM CVT STEPDOWN

## 2017-07-11 PROCEDURE — 36415 COLL VENOUS BLD VENIPUNCTURE: CPT | Performed by: PHYSICIAN ASSISTANT

## 2017-07-11 RX ADMIN — GABAPENTIN 100 MG: 100 CAPSULE ORAL at 08:59

## 2017-07-11 RX ADMIN — DOCUSATE SODIUM 100 MG: 100 CAPSULE, LIQUID FILLED ORAL at 17:26

## 2017-07-11 RX ADMIN — CEFAZOLIN 1 G: 1 INJECTION, POWDER, FOR SOLUTION INTRAMUSCULAR; INTRAVENOUS at 05:59

## 2017-07-11 RX ADMIN — DILTIAZEM HYDROCHLORIDE 30 MG: 30 TABLET, FILM COATED ORAL at 09:02

## 2017-07-11 RX ADMIN — Medication 10 ML: at 21:33

## 2017-07-11 RX ADMIN — OXYCODONE AND ACETAMINOPHEN 2 TABLET: 5; 325 TABLET ORAL at 02:02

## 2017-07-11 RX ADMIN — GABAPENTIN 100 MG: 100 CAPSULE ORAL at 17:26

## 2017-07-11 RX ADMIN — CLOPIDOGREL 75 MG: 75 TABLET, FILM COATED ORAL at 17:27

## 2017-07-11 RX ADMIN — OXYCODONE AND ACETAMINOPHEN 2 TABLET: 5; 325 TABLET ORAL at 09:11

## 2017-07-11 RX ADMIN — AMIODARONE HYDROCHLORIDE 200 MG: 200 TABLET ORAL at 17:27

## 2017-07-11 RX ADMIN — Medication 10 ML: at 17:28

## 2017-07-11 RX ADMIN — LOSARTAN POTASSIUM 25 MG: 25 TABLET, FILM COATED ORAL at 09:00

## 2017-07-11 RX ADMIN — DULOXETINE HYDROCHLORIDE 60 MG: 60 CAPSULE, DELAYED RELEASE ORAL at 09:00

## 2017-07-11 RX ADMIN — CEFAZOLIN 1 G: 1 INJECTION, POWDER, FOR SOLUTION INTRAMUSCULAR; INTRAVENOUS at 21:30

## 2017-07-11 RX ADMIN — CEFAZOLIN 1 G: 1 INJECTION, POWDER, FOR SOLUTION INTRAMUSCULAR; INTRAVENOUS at 11:28

## 2017-07-11 RX ADMIN — CYANOCOBALAMIN TAB 1000 MCG 1000 MCG: 1000 TAB at 09:00

## 2017-07-11 RX ADMIN — METOPROLOL TARTRATE 12.5 MG: 25 TABLET ORAL at 21:20

## 2017-07-11 RX ADMIN — DILTIAZEM HYDROCHLORIDE 30 MG: 30 TABLET, FILM COATED ORAL at 17:27

## 2017-07-11 RX ADMIN — OXYCODONE AND ACETAMINOPHEN 2 TABLET: 5; 325 TABLET ORAL at 21:19

## 2017-07-11 RX ADMIN — DILTIAZEM HYDROCHLORIDE 30 MG: 30 TABLET, FILM COATED ORAL at 21:19

## 2017-07-11 RX ADMIN — Medication 250 MG: at 08:59

## 2017-07-11 RX ADMIN — AMIODARONE HYDROCHLORIDE 200 MG: 200 TABLET ORAL at 09:01

## 2017-07-11 RX ADMIN — DOCUSATE SODIUM 100 MG: 100 CAPSULE, LIQUID FILLED ORAL at 08:59

## 2017-07-11 RX ADMIN — METOPROLOL TARTRATE 12.5 MG: 25 TABLET ORAL at 09:00

## 2017-07-11 RX ADMIN — VITAMIN D, TAB 1000IU (100/BT) 1000 UNITS: 25 TAB at 08:59

## 2017-07-11 RX ADMIN — ASPIRIN 81 MG CHEWABLE TABLET 81 MG: 81 TABLET CHEWABLE at 08:59

## 2017-07-11 RX ADMIN — ATORVASTATIN CALCIUM 40 MG: 40 TABLET, FILM COATED ORAL at 21:20

## 2017-07-11 RX ADMIN — OXYCODONE AND ACETAMINOPHEN 2 TABLET: 5; 325 TABLET ORAL at 17:26

## 2017-07-11 RX ADMIN — Medication 10 ML: at 06:03

## 2017-07-11 RX ADMIN — FERROUS SULFATE TAB 325 MG (65 MG ELEMENTAL FE) 325 MG: 325 (65 FE) TAB at 09:01

## 2017-07-11 RX ADMIN — OXYCODONE AND ACETAMINOPHEN 2 TABLET: 5; 325 TABLET ORAL at 13:27

## 2017-07-11 NOTE — PROGRESS NOTES
Problem: Mobility Impaired (Adult and Pediatric)  Goal: *Acute Goals and Plan of Care (Insert Text)  STGs to be addressed within 3 days:  1. Activity tolerance: Tolerate up in chair 1-2 hrs for ADLs. 2. Transfers: Sit to stand to chair modified independent with LRAD for ADLs. LTGs to be addressed within 7 days:  1. Ambulation: Ambulate > 250 ft modified independent with LRAD for home mobility. 2. Patient Education: Independent with HEP for home safety. 3. Stairs: Up/Down 5 steps CGA with HR for home entry. PHYSICAL THERAPY TREATMENT     Patient: Diana Vargas (21 y.o. male)  Date: 7/11/2017  Diagnosis: Ruptured abdominal aortic aneurysm (AAA) ([facility] [address] [I71.3] <principal problem not specified>  Procedure(s) (LRB):  EXPLORATION OF LEFT FEMORAL ARTERY/ possible BYPASS/ possible  LEFT AMPUTATION KNEE(AKA) (Left) 7 Days Post-Op  Precautions: Fall, WBAT (LLE, wound vac L groin)  Chart, physical therapy assessment, plan of care and goals were reviewed. ASSESSMENT:  Pt presents with increased activity tolerance as indicated by increased single trial ambulation distance. Improved L LE wb'ing tolerance/SLS noted as pt is able to perform step through pattern throughout gait training and with minimal antalgic presentation noted. Progression toward goals:  [X]      Improving appropriately and progressing toward goals  [ ]      Improving slowly and progressing toward goals  [ ]      Not making progress toward goals and plan of care will be adjusted       PLAN:   Patient continues to benefit from skilled intervention to address the above impairments. Continue treatment per established plan of care. Discharge Recommendations:  Home Health  Further Equipment Recommendations for Discharge:  rolling walker and N/A       SUBJECTIVE:   Patient stated It feels good to get out of that darn bed.       OBJECTIVE DATA SUMMARY:   Critical Behavior:  Neurologic State: Alert  Orientation Level: Oriented X4  Cognition: Follows commands     Functional Mobility Training:  Transfers:  Sit to Stand: Modified independent (x's 5 trials)  Stand to Sit: Modified independent  Bed to Chair: Modified independent  Other: stand step with RW and setup of wound vac/IV pole  Balance:  Sitting: Intact  Standing: Intact; With support  Ambulation/Gait Training:  Distance (ft): 200 Feet (ft)  Assistive Device: Walker, rolling  Ambulation - Level of Assistance: Supervision  Gait Abnormalities: Antalgic;Decreased step clearance (step through pattern utilized throughout)  Left Side Weight Bearing: As tolerated  Base of Support: Narrowed  Speed/Suzanna:  (appropriate)  Pain:  Pt reports 6/10 pain or discomfort prior to treatment. Pt reports 6/10 pain or discomfort post treatment. Activity Tolerance:   Fair+  Please refer to the flowsheet for vital signs taken during this treatment.   After treatment:   [X] Patient left in no apparent distress sitting up in chair  [ ] Patient left in no apparent distress in bed  [X] Call bell left within reach  [ ] Nursing notified  [ ] Caregiver present  [ ] Bed alarm activated      Tomasa Bush PTA   Time Calculation: 24 mins

## 2017-07-11 NOTE — PROGRESS NOTES
met with patient, completed the initial Spiritual Assessment of the patient, and offered Pastoral Care, see flow sheets for interventions. Pastoral support provided. Patient says he hopes to be discharged soon. He says he feels better. Patient does not have any Anglican/cultural needs that will affect patients preferences in health care. Chart reviewed. Chaplains will continue to follow and will provide pastoral care on an as needed/as requested basis. Abram Murray MDiv.   Board Certified [employer] [phone]

## 2017-07-11 NOTE — PROGRESS NOTES
Pt doing well  Anticipated d/c soon  Had his subcutaneous heparin last night and again had significant bleeding from injection site  No labs for a few days   Will check cbc now and decide on [facility] for d/c    H/H ok.    However, platelet levels [phone]  Discussed with dr Darren Terry and patient  Will hold discharge  Ask hematology to evaluate

## 2017-07-12 ENCOUNTER — HOME HEALTH ADMISSION (OUTPATIENT)
Dept: HOME HEALTH SERVICES | Facility: HOME HEALTH | Age: 57
End: 2017-07-12
Payer: COMMERCIAL

## 2017-07-12 VITALS
DIASTOLIC BLOOD PRESSURE: 73 MMHG | HEART RATE: 66 BPM | RESPIRATION RATE: 18 BRPM | OXYGEN SATURATION: 100 % | SYSTOLIC BLOOD PRESSURE: 125 MMHG | BODY MASS INDEX: 22.46 KG/M2 | TEMPERATURE: 98.5 F | WEIGHT: 156.9 LBS | HEIGHT: 70 IN

## 2017-07-12 LAB
FERRITIN SERPL-MCNC: 583 NG/ML (ref 8–388)
IRON SATN MFR SERPL: 7 %
IRON SERPL-MCNC: 17 UG/DL (ref 50–175)
TIBC SERPL-MCNC: 232 UG/DL (ref 250–450)

## 2017-07-12 PROCEDURE — 74011000258 HC RX REV CODE- 258: Performed by: SURGERY

## 2017-07-12 PROCEDURE — 74011250637 HC RX REV CODE- 250/637: Performed by: SURGERY

## 2017-07-12 PROCEDURE — 36415 COLL VENOUS BLD VENIPUNCTURE: CPT | Performed by: INTERNAL MEDICINE

## 2017-07-12 PROCEDURE — 83540 ASSAY OF IRON: CPT | Performed by: INTERNAL MEDICINE

## 2017-07-12 PROCEDURE — 82728 ASSAY OF FERRITIN: CPT | Performed by: INTERNAL MEDICINE

## 2017-07-12 PROCEDURE — 97110 THERAPEUTIC EXERCISES: CPT

## 2017-07-12 PROCEDURE — 74011250636 HC RX REV CODE- 250/636: Performed by: SURGERY

## 2017-07-12 PROCEDURE — 97116 GAIT TRAINING THERAPY: CPT

## 2017-07-12 RX ADMIN — DILTIAZEM HYDROCHLORIDE 30 MG: 30 TABLET, FILM COATED ORAL at 12:17

## 2017-07-12 RX ADMIN — VITAMIN D, TAB 1000IU (100/BT) 1000 UNITS: 25 TAB at 09:54

## 2017-07-12 RX ADMIN — Medication 250 MG: at 09:54

## 2017-07-12 RX ADMIN — CEFAZOLIN 1 G: 1 INJECTION, POWDER, FOR SOLUTION INTRAMUSCULAR; INTRAVENOUS at 05:57

## 2017-07-12 RX ADMIN — DILTIAZEM HYDROCHLORIDE 30 MG: 30 TABLET, FILM COATED ORAL at 16:34

## 2017-07-12 RX ADMIN — Medication 10 ML: at 05:58

## 2017-07-12 RX ADMIN — CEFAZOLIN 1 G: 1 INJECTION, POWDER, FOR SOLUTION INTRAMUSCULAR; INTRAVENOUS at 12:17

## 2017-07-12 RX ADMIN — OXYCODONE AND ACETAMINOPHEN 2 TABLET: 5; 325 TABLET ORAL at 09:55

## 2017-07-12 RX ADMIN — LOSARTAN POTASSIUM 25 MG: 25 TABLET, FILM COATED ORAL at 09:55

## 2017-07-12 RX ADMIN — DILTIAZEM HYDROCHLORIDE 30 MG: 30 TABLET, FILM COATED ORAL at 09:55

## 2017-07-12 RX ADMIN — DULOXETINE HYDROCHLORIDE 60 MG: 60 CAPSULE, DELAYED RELEASE ORAL at 09:55

## 2017-07-12 RX ADMIN — OXYCODONE AND ACETAMINOPHEN 2 TABLET: 5; 325 TABLET ORAL at 12:57

## 2017-07-12 RX ADMIN — OXYCODONE AND ACETAMINOPHEN 2 TABLET: 5; 325 TABLET ORAL at 05:49

## 2017-07-12 RX ADMIN — GABAPENTIN 100 MG: 100 CAPSULE ORAL at 09:55

## 2017-07-12 RX ADMIN — CYANOCOBALAMIN TAB 1000 MCG 1000 MCG: 1000 TAB at 09:54

## 2017-07-12 RX ADMIN — SODIUM CHLORIDE 250 ML: 900 INJECTION, SOLUTION INTRAVENOUS at 05:58

## 2017-07-12 RX ADMIN — METOPROLOL TARTRATE 12.5 MG: 25 TABLET ORAL at 09:55

## 2017-07-12 RX ADMIN — DOCUSATE SODIUM 100 MG: 100 CAPSULE, LIQUID FILLED ORAL at 09:55

## 2017-07-12 RX ADMIN — FERROUS SULFATE TAB 325 MG (65 MG ELEMENTAL FE) 325 MG: 325 (65 FE) TAB at 09:55

## 2017-07-12 RX ADMIN — AMIODARONE HYDROCHLORIDE 200 MG: 200 TABLET ORAL at 09:55

## 2017-07-12 RX ADMIN — ASPIRIN 81 MG CHEWABLE TABLET 81 MG: 81 TABLET CHEWABLE at 09:55

## 2017-07-12 NOTE — PROGRESS NOTES
Called by nurse about family's concerns regarding plavix/coumadin  Which we were going to have pt resume coumadin and stop plavix, and continue baby aspirin  Daughter concerned about stopping plavix based on prior discussion with cardiologist  Contacted cards office, and his physician is out until tomorrow  PA suggested could still go home since had received today's dose, and offer clarification on further instructions through the office with physician is back in office tomorrow  Or pt may wish to stay and cardiology will evaluate tomorrow on hospital rounds  Nurse to notify me of pt/family decision so we can facilitate appropriate follow up regarding this concern

## 2017-07-12 NOTE — PROGRESS NOTES
DISCHARGE SUMMARY from Nurse    The following personal items are in your possession at time of discharge:    Dental Appliances: At bedside, Lowers, Uppers  Visual Aid: None     Home Medications: None  Jewelry: Necklace, With patient  Clothing: With patient  Other Valuables: None             PATIENT INSTRUCTIONS:    After general anesthesia or intravenous sedation, for 24 hours or while taking prescription Narcotics:  · Limit your activities  · Do not drive and operate hazardous machinery  · Do not make important personal or business decisions  · Do  not drink alcoholic beverages  · If you have not urinated within 8 hours after discharge, please contact your surgeon on call. Report the following to your surgeon:  · Excessive pain, swelling, redness or odor of or around the surgical area  · Temperature over 100.5  · Nausea and vomiting lasting longer than 4 hours or if unable to take medications  · Any signs of decreased circulation or nerve impairment to extremity: change in color, persistent  numbness, tingling, coldness or increase pain  · Any questions        What to do at Home:  Recommended activity: Activity as tolerated and No driving while on analgesics, per physician    If you experience any of the following symptoms shortness of breath, chest pain, dizziness, please follow up with your physician. *  Please give a list of your current medications to your Primary Care Provider. *  Please update this list whenever your medications are discontinued, doses are      changed, or new medications (including over-the-counter products) are added. *  Please carry medication information at all times in case of emergency situations. These are general instructions for a healthy lifestyle:    No smoking/ No tobacco products/ Avoid exposure to second hand smoke    Surgeon General's Warning:  Quitting smoking now greatly reduces serious risk to your health.     Obesity, smoking, and sedentary lifestyle greatly increases your risk for illness    A healthy diet, regular physical exercise & weight monitoring are important for maintaining a healthy lifestyle    You may be retaining fluid if you have a history of heart failure or if you experience any of the following symptoms:  Weight gain of 3 pounds or more overnight or 5 pounds in a week, increased swelling in our hands or feet or shortness of breath while lying flat in bed. Please call your doctor as soon as you notice any of these symptoms; do not wait until your next office visit. Recognize signs and symptoms of STROKE:    F-face looks uneven    A-arms unable to move or move unevenly    S-speech slurred or non-existent    T-time-call 911 as soon as signs and symptoms begin-DO NOT go       Back to bed or wait to see if you get better-TIME IS BRAIN. Warning Signs of HEART ATTACK     Call 911 if you have these symptoms:   Chest discomfort. Most heart attacks involve discomfort in the center of the chest that lasts more than a few minutes, or that goes away and comes back. It can feel like uncomfortable pressure, squeezing, fullness, or pain.  Discomfort in other areas of the upper body. Symptoms can include pain or discomfort in one or both arms, the back, neck, jaw, or stomach.  Shortness of breath with or without chest discomfort.  Other signs may include breaking out in a cold sweat, nausea, or lightheadedness. Don't wait more than five minutes to call 911 - MINUTES MATTER! Fast action can save your life. Calling 911 is almost always the fastest way to get lifesaving treatment. Emergency Medical Services staff can begin treatment when they arrive -- up to an hour sooner than if someone gets to the hospital by car. The discharge information has been reviewed with the patient. The patient verbalized understanding.     Discharge medications reviewed with the patient and appropriate educational materials and side effects teaching were provided.

## 2017-07-12 NOTE — CONSULTS
[address]    Name:  Estela James  MR#:  [id number]  :  1960  Account #:  [de-identified]  Date of Adm:  2017  Date of Consultation:  2017      REFERRING PHYSICIAN: Ben Gutierrez MD    REASON FOR EVALUATION: Anemia and thrombocytosis. HISTORY OF PRESENT ILLNESS: The patient is a 64-year-old male  with significant history of peripheral vascular disease and has had 3  admissions in the last several weeks with his management of  peripheral vascular disease. During his evaluation he has been noted  to have an elevated platelet count between 600,000 and 650,000 as  well as a low hemoglobin level and I have been asked to evaluate him. Review of his record indicates he had a low hemoglobin level of 6.8  gram percent with a hematocrit of 19 on 2017 and was  transfused with packed red cells. He has mildly elevated white count  and his platelets have fluctuated between 300,000 to 600,000. The  patient recently had aortobifemoral bypass, femoral to above-knee  popliteal artery bypass with severe peripheral vascular disease and  gangrene and ulceration in his foot. The course was also  complicated by an abscess overlying the right-sided aortobifemoral  bypass that required drainage. He also had a left groin blow out  requiring repair in the OR. He has had significant blood loss during  these numerous procedures where he had to be taken for exploration.   ([place])        Lizbeth Kelley MD    SD / TW  D:  2017   09:14  T:  2017   15:21  Job #:  [id number]

## 2017-07-12 NOTE — PROGRESS NOTES
Appreciate heme input  Awaiting blood draw for labs requested  Vac change due today - will ask to apply the home vac, which is at bedside, for hopeful d/c home today

## 2017-07-12 NOTE — PROGRESS NOTES
Problem: Mobility Impaired (Adult and Pediatric)  Goal: *Acute Goals and Plan of Care (Insert Text)  STGs to be addressed within 3 days:  1. Activity tolerance: Tolerate up in chair 1-2 hrs for ADLs. 2. Transfers: Sit to stand to chair modified independent with LRAD for ADLs. LTGs to be addressed within 7 days:  1. Ambulation: Ambulate > 250 ft modified independent with LRAD for home mobility. 2. Patient Education: Independent with HEP for home safety. 3. Stairs: Up/Down 5 steps CGA with HR for home entry. PHYSICAL THERAPY TREATMENT     Patient: Mariella Chan (54 y.o. male)  Date: 7/12/2017  Diagnosis: Ruptured abdominal aortic aneurysm (AAA) ([facility] [address] [I71.3] <principal problem not specified>  Procedure(s) (LRB):  EXPLORATION OF LEFT FEMORAL ARTERY/ possible BYPASS/ possible  LEFT AMPUTATION KNEE(AKA) (Left) 8 Days Post-Op  Precautions: Fall, WBAT (LLE, wound vac L groin)  Chart, physical therapy assessment, plan of care and goals were reviewed. ASSESSMENT:  Pt demo's increased dynamic standing balance and ambulation proficiency as indicated by the ability to ambulate community distances w/o AD,0 LOB, and symmetrical step through pattern. Pt is progressing fantastically with functional mobility. Progression toward goals:  [X]      Improving appropriately and progressing toward goals  [ ]      Improving slowly and progressing toward goals  [ ]      Not making progress toward goals and plan of care will be adjusted       PLAN:  Patient continues to benefit from skilled intervention to address the above impairments. Continue treatment per established plan of care. Discharge Recommendations:  Home Health  Further Equipment Recommendations for Discharge:  N/A       SUBJECTIVE:   Patient stated I feel pretty good.       OBJECTIVE DATA SUMMARY:   Critical Behavior:  Neurologic State: Alert  Orientation Level: Oriented X4  Cognition: Follows commands     Functional Mobility Training:  Bed Mobility:  Rolling: Independent  Supine to Sit: Independent  Transfers:  Sit to Stand: Independent  Stand to Sit: Independent  Bed to Chair: Modified independent  Other: stand step without AD  Balance:  Sitting: Intact  Standing: Impaired; Without support  Standing - Static: Good  Standing - Dynamic : Fair (fair+)  Ambulation/Gait Training:  Distance (ft): 150 Feet (ft)  Assistive Device:  (0 AD)  Ambulation - Level of Assistance: Supervision  Gait Abnormalities: Decreased step clearance; Antalgic (slightly antalgic with symetrical step through noted)  Left Side Weight Bearing: As tolerated  Base of Support: Narrowed  Therapeutic Exercises:      Pain:  Pt reports 3/10 pain or discomfort prior to treatment. Pt reports 3/10 pain or discomfort post treatment. Activity Tolerance:   Fair+  Please refer to the flowsheet for vital signs taken during this treatment.   After treatment:   [X] Patient left in no apparent distress sitting up in chair  [ ] Patient left in no apparent distress in bed  [X] Call bell left within reach  [ ] Nursing notified  [ ] Caregiver present  [ ] Bed alarm activated      Jaison Motley PTA   Time Calculation: 24 mins

## 2017-07-12 NOTE — DISCHARGE INSTRUCTIONS
Learning About Aortobifemoral Bypass Surgery for Peripheral Arterial Disease  What is an aortobifemoral bypass? An aortobifemoral (say \"fx-QA-jwl-by-FEM-uh-nani\") bypass is surgery to redirect blood flow around blocked blood vessels in your belly or pelvis. These blocked blood vessels are caused by peripheral arterial disease. The surgery is done to get more blood flow to the legs. This may allow you to walk farther. Your doctor will use a man-made blood vessel, called a graft, to bypass the blocked blood vessels. The graft will carry blood from the aorta to the femoral artery in the groin area of each thigh. The aorta is the large blood vessel that carries blood from the heart to the blood vessels in the belly. The femoral arteries are large blood vessels that carry blood from the blood vessels in the belly and pelvis to the legs. How is the surgery done? You will be asleep during the surgery. The doctor will make cuts (incisions) in your belly and in the groin area of each thigh. The doctor will connect the graft to the aorta through the cut in the belly. He or she will then tunnel the graft down to the cuts in your groin. This connects the bypass to your femoral arteries. When the graft is in place, the doctor will close the cuts in your skin with stitches or staples. What can you expect after this surgery? You will probably spend 4 to 7 days in the hospital. Your belly and groin will be sore for several weeks. You will probably feel more tired than usual for several weeks. You may be able to do many of your usual activities after 4 to 6 weeks. But you will likely need 2 to 3 months to fully recover. You will probably need to take at least 4 to 6 weeks off from work. It depends on the type of work you do and how you feel. Follow-up care is a key part of your treatment and safety. Be sure to make and go to all appointments, and call your doctor if you are having problems.  It's also a good idea to know your test results and keep a list of the medicines you take. Where can you learn more? Go to http://yuniel-lyndsay.info/. Enter 24 [id number] in the search box to learn more about \"Learning About Aortobifemoral Bypass Surgery for Peripheral Arterial Disease. \"  Current as of: March 20, 2017  Content Version: 11.3  © 3569-9616 [employer]. Care instructions adapted under license by [employer] (which disclaims liability or warranty for this information). If you have questions about a medical condition or this instruction, always ask your healthcare professional. [place] [address] any warranty or liability for your use of this information. Learning About Aortobifemoral Bypass Surgery for Peripheral Arterial Disease  What is an aortobifemoral bypass? An aortobifemoral (say \"fp-DE-dwu-by-FEM-uh-nani\") bypass is surgery to redirect blood flow around blocked blood vessels in your belly or pelvis. These blocked blood vessels are caused by peripheral arterial disease. The surgery is done to get more blood flow to the legs. This may allow you to walk farther. Your doctor will use a man-made blood vessel, called a graft, to bypass the blocked blood vessels. The graft will carry blood from the aorta to the femoral artery in the groin area of each thigh. The aorta is the large blood vessel that carries blood from the heart to the blood vessels in the belly. The femoral arteries are large blood vessels that carry blood from the blood vessels in the belly and pelvis to the legs. How is the surgery done? You will be asleep during the surgery. The doctor will make cuts (incisions) in your belly and in the groin area of each thigh. The doctor will connect the graft to the aorta through the cut in the belly. He or she will then tunnel the graft down to the cuts in your groin. This connects the bypass to your femoral arteries.   When the graft is in place, the doctor will close the cuts in your skin with stitches or staples. What can you expect after this surgery? You will probably spend 4 to 7 days in the hospital. Your belly and groin will be sore for several weeks. You will probably feel more tired than usual for several weeks. You may be able to do many of your usual activities after 4 to 6 weeks. But you will likely need 2 to 3 months to fully recover. You will probably need to take at least 4 to 6 weeks off from work. It depends on the type of work you do and how you feel. Follow-up care is a key part of your treatment and safety. Be sure to make and go to all appointments, and call your doctor if you are having problems. It's also a good idea to know your test results and keep a list of the medicines you take. Where can you learn more? Go to http://yuniel-lyndsay.info/. Enter 24 [id number] in the search box to learn more about \"Learning About Aortobifemoral Bypass Surgery for Peripheral Arterial Disease. \"  Current as of: March 20, 2017  Content Version: 11.3  © 0389-9237 [employer]. Care instructions adapted under license by [employer] (which disclaims liability or warranty for this information). If you have questions about a medical condition or this instruction, always ask your healthcare professional. [place] [address] any warranty or liability for your use of this information. Aortobifemoral Bypass: What to Expect at Home  Your Recovery  An aortobifemoral bypass is surgery to redirect blood around narrowed or blocked blood vessels in your belly or groin. The surgery is done to increase blood flow to the legs. This may allow you to walk farther. The doctor used a man-made blood vessel, called a graft, to bypass the narrowed or blocked blood vessels. The graft will carry blood from the aorta to the femoral artery in each thigh.  The aorta is the large blood vessel that carries blood from the heart to the blood vessels in the belly. The femoral arteries are large blood vessels that carry blood from the blood vessels in the belly to the legs. You can expect your belly and groin to be sore for several weeks. You will probably feel more tired than usual for several weeks after surgery. You may be able to do many of your usual activities after 4 to 6 weeks. But you will probably need 2 to 3 months to fully recover, especially if you usually do a lot of physical activities. This care sheet gives you a general idea about how long it will take for you to recover. But each person recovers at a different pace. Follow the steps below to feel better as quickly as possible. How can you care for yourself at home? Activity  · Rest when you feel tired. Getting enough sleep will help you recover. · Try to walk each day. Start by walking a little more than you did the day before. Bit by bit, increase the amount you walk. Walking boosts blood flow and helps prevent pneumonia and constipation. · Avoid strenuous activities, such as bicycle riding, jogging, weight lifting, or aerobic exercise, for 6 weeks or until your doctor says it is okay. · For 6 weeks, avoid lifting anything that would make you strain. This may include a child, heavy grocery bags and milk containers, a heavy briefcase or backpack, cat litter or dog food bags, or a vacuum . · Hold a pillow over your belly incision when you cough or take deep breaths. This will support your belly and decrease your pain. · Do breathing exercises at home as instructed by your doctor. This will help prevent pneumonia. · Ask your doctor when you can drive again. · You will probably need to take at least 4 to 6 weeks off from work. It depends on the type of work you do and how you feel. · You may shower as usual. Pat the incisions dry. Do not take a bath for the first 2 weeks, or until your doctor tells you it is okay.   · Ask your doctor when it is okay for you to have sex. Diet  · You can eat your normal diet. If your stomach is upset, try bland, low-fat foods like plain rice, broiled chicken, toast, and yogurt. · Drink plenty of fluids (unless your doctor tells you not to). · You may notice that your bowel movements are not regular right after your surgery. This is common. Try to avoid constipation and straining with bowel movements. You may want to take a fiber supplement every day. If you have not had a bowel movement after a couple of days, ask your doctor about taking a mild laxative. Medicines  · Your doctor will tell you if and when you can restart your medicines. He or she will also give you instructions about taking any new medicines. · If you take blood thinners, such as warfarin (Coumadin), clopidogrel (Plavix), or aspirin, be sure to talk to your doctor. He or she will tell you if and when to start taking those medicines again. Make sure that you understand exactly what your doctor wants you to do. · Be safe with medicines. Take your medicines exactly as prescribed. Call your doctor if you think you are having a problem with your medicine. · Take pain medicines exactly as directed. ¨ If the doctor gave you a prescription medicine for pain, take it as prescribed. ¨ If you are not taking a prescription pain medicine, ask your doctor if you can take an over-the-counter medicine. · If you think your pain medicine is making you sick to your stomach:  ¨ Take your medicine after meals (unless your doctor has told you not to). ¨ Ask your doctor for a different pain medicine. · If your doctor prescribed antibiotics, take them as directed. Do not stop taking them just because you feel better. You need to take the full course of antibiotics. · Your doctor may prescribe a blood thinner when you go home. This helps prevent blood clots. Be sure you get instructions about how to take your medicine safely.  Blood thinners can cause serious bleeding problems. Incision care  · If you have strips of tape on the incisions, leave the tape on for a week or until it falls off. · Wash the area daily with warm, soapy water, and pat it dry. Don't use hydrogen peroxide or alcohol, which can slow healing. You may cover the area with a gauze bandage if it weeps or rubs against clothing. Change the bandage every day. · Keep the area clean and dry. Follow-up care is a key part of your treatment and safety. Be sure to make and go to all appointments, and call your doctor if you are having problems. It's also a good idea to know your test results and keep a list of the medicines you take. When should you call for help? Call 911 anytime you think you may need emergency care. For example, call if:  · You passed out (lost consciousness). · You have severe trouble breathing. · You have sudden chest pain and shortness of breath, or you cough up blood. · You have severe pain in your belly. · You have chest pain or pressure. This may occur with:  ¨ Sweating. ¨ Shortness of breath. ¨ Nausea or vomiting. ¨ Pain that spreads from the chest to the neck, jaw, or one or both shoulders or arms. ¨ Dizziness or lightheadedness. ¨ A fast or uneven pulse. After calling 911, chew 1 adult-strength aspirin. Wait for an ambulance. Do not try to drive yourself. Call your doctor now or seek immediate medical care if:  · You have severe pain in your leg, or it becomes cold, pale, blue, tingly, or numb. · You are sick to your stomach or cannot keep fluids down. · You have pain that does not get better after you take pain medicine. · You have a fever over 100°F.  · You have loose stitches, or your incisions come open. · Bright red blood has soaked through the bandage over any of your incisions. · You have signs of infection, such as:  ¨ Increased pain, swelling, warmth, or redness. ¨ Red streaks leading from the incision. ¨ Pus draining from the incision.   ¨ Swollen lymph nodes in your neck, armpits, or groin. ¨ A fever. · You have signs of a blood clot, such as:  ¨ Pain in your calf, back of the knee, thigh, or groin. ¨ Redness and swelling in your leg or groin. Watch closely for any changes in your health, and be sure to contact your doctor if:  · You do not have a bowel movement after taking a laxative. Where can you learn more? Go to http://yuniel-lyndsay.info/. Enter M190 in the search box to learn more about \"Aortobifemoral Bypass: What to Expect at Home. \"  Current as of: March 20, 2017  Content Version: 11.3  © 8039-8512 [employer]. Care instructions adapted under license by [employer] (which disclaims liability or warranty for this information). If you have questions about a medical condition or this instruction, always ask your healthcare professional. [address] any warranty or liability for your use of this information.

## 2017-07-12 NOTE — CONSULTS
Note dictated  Anemia, thrombocytosis,  Likely fromblood loss with recent multiple procedures/ inflammation. Secondary thrombocytosis seen with chronic inflammation and iron def. Check iron studies, add oral iron supplements. Will f/u as ou pt. If number do not improve, bone marrow asp and biopsy as out pt.

## 2017-07-12 NOTE — PROGRESS NOTES
DW Marietta Sever PA . She will ZHANE Mitchell regarding poss dc today . [facility] will follow.

## 2017-07-12 NOTE — DISCHARGE SUMMARY
Physician Discharge Summary     Patient ID:  Asnon King  [id number]  40 y.o.  1960    Admit date: 6/27/2017    Discharge date: 7/12/2017      Admitting Physician: Ann Patel MD     Discharge Physician: Ann Patel MD     Admission Diagnoses: Ruptured abdominal aortic aneurysm (AAA) [facility]) [I71.3]    Discharge Diagnoses: There are no discharge diagnoses documented for the most recent discharge. Procedures for this admission: Procedure(s):  EXPLORATION OF LEFT FEMORAL ARTERY/ possible BYPASS/ possible  LEFT AMPUTATION KNEE(AKA)    Discharged Condition: stable    Hospital Course: The patient is a 75-year-old gentleman who was initially brought in for an outpatient I&D of an apparent abscess overlying area of ax fem portion of a bypass. This ended up being fairly benign, but by time he came in for planned procedure, he had acutely developed apparent hematoma of left groin. 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 He had some substantial blood loss from that procedure and required transfusions of PRBCs. He then had several recurrent bleeding episodes after dressing changes, twice, and was taken back emergently to the OR with these instances. It was basically described that he a blow out of his femoral anastomosis. The decision was then made to take the patient back and perform a more distal bypass with ligation of his common femoral artery. He had no further episodes and tolerated vac changes. Did well with PT/OT. Continued wound care done for lower extremity wounds as well. Did show signs of thrombocytosis, and heme consulted. They described that anemia, thrombocytosis, likely from blood loss with recent multiple procedures/ inflammation. Secondary thrombocytosis seen with chronic inflammation and iron def. Check iron studies, add oral iron supplements. Will f/u as ou pt.  If number do not improve, bone marrow asp and biopsy as out pt.    Consults: Hematology/Oncology    Treatments: therapies: PT and OT and wound care; PRBC transfusions     Discharge Exam: GEN: alert, cooperative, no distress, appears stated age  LUNG: clear to auscultation bilaterally  HEART: regular rate and rhythm, S1, S2 normal, no murmur, click, rub or gallop  EXTREMITIES:   minimal leg edema  Wound:  left groin with wound vac  Incision:  medial left upper thigh c/d/i    Disposition: home    Patient Instructions:   Current Discharge Medication List      CONTINUE these medications which have CHANGED    Details   oxyCODONE-acetaminophen (PERCOCET) 5-325 mg per tablet Take 2 Tabs by mouth every four (4) hours as needed. Max Daily Amount: 12 Tabs. Qty: 60 Tab, Refills: 0         CONTINUE these medications which have NOT CHANGED    Details    i      gabapentin (NEURONTIN) 100 mg capsule Take 1 Cap by mouth two (2) times a day. Indications: NEUROPATHIC PAIN  Qty: 60 Cap, Refills: 9      aspirin 81 mg chewable tablet Take 1 Tab by mouth daily (with breakfast). Qty: 90 Tab, Refills: 3      cholecalciferol (VITAMIN D3) 1,000 unit tablet Take 1 Tab by mouth daily. Qty: 90 Tab, Refills: 3      losartan (COZAAR) 25 mg tablet Take 1 Tab by mouth daily. Indications: Chronic Heart Failure, hypertension  Qty: 90 Tab, Refills: 2      ascorbic acid, vitamin C, (VITAMIN C) 250 mg tablet Take 1 Tab by mouth daily (with breakfast). Qty: 90 Tab, Refills: 2      DULoxetine (CYMBALTA) 60 mg capsule Take 1 Cap by mouth daily. Qty: 90 Cap, Refills: 2      zinc sulfate (ZINCATE) 220 (50) mg capsule Take 1 Cap by mouth daily. Qty: 90 Cap, Refills: 0      ferrous sulfate 325 mg (65 mg iron) tablet Take 1 Tab by mouth daily (with breakfast). Qty: 90 Tab, Refills: 3      atorvastatin (LIPITOR) 40 mg tablet Take 1 Tab by mouth nightly. Indications: DYSLIPIDEMIA  Qty: 90 Tab, Refills: 3      amiodarone (CORDARONE) 200 mg tablet Take 1 Tab by mouth two (2) times a day.  Start taking in the morning of 5/3/17  Indications: VENTRICULAR RATE CONTROL IN ATRIAL FIBRILLATION  Qty: 60 Tab, Refills: 0      metoprolol tartrate (LOPRESSOR) 25 mg tablet Take 0.5 Tabs by mouth every twelve (12) hours. Indications: hypertension, VENTRICULAR RATE CONTROL IN ATRIAL FIBRILLATION  Qty: 30 Tab, Refills: 6      dilTIAZem (CARDIZEM) 30 mg tablet Take 1 Tab by mouth Before breakfast, lunch, dinner and at bedtime. Indications: hypertension  Qty: 120 Tab, Refills: 6      cyanocobalamin (VITAMIN B-12) 1,000 mcg tablet Take 1 Tab by mouth daily. Qty: 90 Tab, Refills: 3      warfarin (COUMADIN) 2.5 mg tablet 2.5 mg po daily except on Tuesday Take 3.75 mg and then as per [facility]  Qty: 30 Tab, Refills: 6         STOP taking these medications       trimethoprim-sulfamethoxazole (BACTRIM, SEPTRA)  mg per tablet Comments:   Reason for Stopping:         OTHER Comments:   Reason for Stopping:         OTHER Comments:   Reason for Stopping:               Reference discharge instructions as provided by nursing for diet, labs, medications, activity, wound care and any outpatient referrals.     Follow-up with dr Lisa Nolasco in 2 weeks as well as dr Edd Edmondson in about 2 weeks     Signed:  JUNE Murphy  7/12/2017  11:29 AM    Note addended that pt will resume coumadin and aspirin only, no plavix, per dr Toya Waters

## 2017-07-12 NOTE — HOME CARE
Rec HC order - d/c noted for today [facility] will follow for SN/PT/OT/MSW/Aide (if allowed by insurance company) - CARROLL Hall RN

## 2017-07-13 ENCOUNTER — TELEPHONE (OUTPATIENT)
Dept: CARDIOLOGY CLINIC | Age: 57
End: 2017-07-13

## 2017-07-13 ENCOUNTER — HOSPITAL ENCOUNTER (OUTPATIENT)
Dept: LAB | Age: 57
Discharge: HOME | End: 2017-07-13

## 2017-07-13 ENCOUNTER — PATIENT OUTREACH (OUTPATIENT)
Dept: FAMILY MEDICINE CLINIC | Age: 57
End: 2017-07-13

## 2017-07-13 PROCEDURE — 99001 SPECIMEN HANDLING PT-LAB: CPT | Performed by: FAMILY MEDICINE

## 2017-07-13 PROCEDURE — 99001 SPECIMEN HANDLING PT-LAB: CPT | Performed by: INTERNAL MEDICINE

## 2017-07-13 RX ORDER — CLOPIDOGREL BISULFATE 75 MG/1
75 TABLET ORAL DAILY
Qty: 90 TAB | Refills: 3 | Status: SHIPPED | OUTPATIENT
Start: 2017-07-13 | End: 2017-08-07

## 2017-07-13 RX ORDER — AMIODARONE HYDROCHLORIDE 200 MG/1
200 TABLET ORAL DAILY
Qty: 60 TAB | Refills: 0 | Status: SHIPPED | OUTPATIENT
Start: 2017-07-13 | End: 2017-10-28 | Stop reason: SDUPTHER

## 2017-07-13 RX ORDER — CLOPIDOGREL BISULFATE 75 MG/1
75 TABLET ORAL DAILY
Qty: 90 TAB | Refills: 3 | Status: CANCELLED | OUTPATIENT
Start: 2017-07-13

## 2017-07-13 RX ORDER — AMIODARONE HYDROCHLORIDE 200 MG/1
200 TABLET ORAL DAILY
Qty: 60 TAB | Refills: 0 | Status: CANCELLED | OUTPATIENT
Start: 2017-07-13

## 2017-07-13 NOTE — PROGRESS NOTES
NNTOCIP  Patient admitted to [facility] on 6/27/2017 to 7/12/2017 for Pseudoaneurysm of femoral artery [facility])    Course of hospitalization:  Hospital Course: The patient is a 57-year-old gentleman who was initially brought in for an outpatient I&D of an apparent abscess overlying area of ax fem portion of a bypass. This ended up being fairly benign, but by time he came in for planned procedure, he had acutely developed apparent hematoma of left groin. 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 He had some substantial blood loss from that procedure and required transfusions of PRBCs. He then had several recurrent bleeding episodes after dressing changes, twice, and was taken back emergently to the OR with these instances. It was basically described that he a blow out of his femoral anastomosis. The decision was then made to take the patient back and perform a more distal bypass with ligation of his common femoral artery. He had no further episodes and tolerated vac changes. Did well with PT/OT. Continued wound care done for lower extremity wounds as well. Did show signs of thrombocytosis, and heme consulted. They described that anemia, thrombocytosis, likely from blood loss with recent multiple procedures/ inflammation. Secondary thrombocytosis seen with chronic inflammation and iron def. Check iron studies, add oral iron supplements. Will f/u as ou pt. If number do not improve, bone marrow asp and biopsy as out pt. Pertinent labs:  Results for Romayne Ping (MRN [id number]) as of 7/13/2017 16:25   Ref.  Range 7/8/2017 11:40 7/8/2017 11:40 7/11/2017 11:45 7/12/2017 11:49   WBC Latest Ref Range: 4.6 - 13.2 K/uL 11.3  13.5 (H)    RBC Latest Ref Range: 4.70 - 5.50 M/uL 3.56 (L)  3.59 (L)    HGB Latest Ref Range: 13.0 - 16.0 g/dL 10.3 (L)  10.2 (L)    HCT Latest Ref Range: 36.0 - 48.0 % 30.9 (L)  31.4 (L)    MCV Latest Ref Range: 74.0 - 97.0 FL 86.8  87.5    MCH Latest Ref Range: 24.0 - 34.0 PG 28.9  28.4    MCHC Latest Ref Range: 31.0 - 37.0 g/dL 33.3  32.5    RDW Latest Ref Range: 11.6 - 14.5 % 16.4 (H)  16.3 (H)    PLATELET Latest Ref Range: 135 - 420 K/uL 604 (H)  704 (H)    MPV Latest Ref Range: 9.2 - 11.8 FL 9.2  9.2    NEUTROPHILS Latest Ref Range: 40 - 73 % 81 (H)  74 (H)    LYMPHOCYTES Latest Ref Range: 21 - 52 % 11 (L)  12 (L)    MONOCYTES Latest Ref Range: 3 - 10 % 7  10    EOSINOPHILS Latest Ref Range: 0 - 5 % 1  4    BASOPHILS Latest Ref Range: 0 - 2 % 0  0    DF Latest Units:   MANUAL  AUTOMATED    ABS. NEUTROPHILS Latest Ref Range: 1.8 - 8.0 K/UL 9.2 (H)  10.1 (H)    ABS. LYMPHOCYTES Latest Ref Range: 0.9 - 3.6 K/UL 1.2  1.6    ABS. MONOCYTES Latest Ref Range: 0.05 - 1.2 K/UL 0.8  1.3 (H)    ABS. EOSINOPHILS Latest Ref Range: 0.0 - 0.4 K/UL 0.1  0.5 (H)    ABS. BASOPHILS Latest Ref Range: 0.0 - 0.06 K/UL 0.0  0.0    RBC COMMENTS Latest Units:   ANISOCYTOSIS. .. POLYCHROMASIA. ..      PLATELET COMMENTS Latest Units:   INCREASED PLATELETS      aPTT Latest Ref Range: 23.0 - 36.4 SEC 33.3      Sodium Latest Ref Range: 136 - 145 mmol/L 134 (L)      Potassium Latest Ref Range: 3.5 - 5.5 mmol/L 4.5      Chloride Latest Ref Range: 100 - 108 mmol/L 96 (L)      CO2 Latest Ref Range: 21 - 32 mmol/L 30      Anion gap Latest Ref Range: 3.0 - 18 mmol/L 8      Glucose Latest Ref Range: 74 - 99 mg/dL 95      BUN Latest Ref Range: 7.0 - 18 MG/DL 9      Creatinine Latest Ref Range: 0.6 - 1.3 MG/DL 0.65      BUN/Creatinine ratio Latest Ref Range: 12 - 20   14      Calcium Latest Ref Range: 8.5 - 10.1 MG/DL 9.0      GFR est non-AA Latest Ref Range: >60 ml/min/1.73m2 >60      GFR est AA Latest Ref Range: >60 ml/min/1.73m2 >60      Iron Latest Ref Range: 50 - 175 ug/dL    17 (L)   TIBC Latest Ref Range: 250 - 450 ug/dL    232 (L)   Iron % saturation Latest Units: %    7   Ferritin Latest Ref Range: 8 - 388 NG/ML    583 (H)     Inpatient Consults:  Consults: Hematology/Oncology  Discharge diagnoses:   Pseudoaneurysm of femoral artery (HCC)  RRAT Score: 16  CCI: 3  Hospital admissions in past year: 6  ED admissions in past year: 0    Contacted patient for hospital follow up. Introduced self, role and reason for call. Verified 2 patient identifiers. Patient reports:  Saw the foot doctor today and she is very happy with his wound healing in his foot. He sees his kidney doctor tomorrow and he is just happy to be out of the hospital.  The doctor released him to drive but his daughter was a bit worried about it although he tried to tell her if he had a problem it would most likely happen while he was walking around not when he was driving his car. He did say for right now though he wasn't going to be driving too much  Patient denies:  Sever pain in incision, problems with the wound VAC,   ADL's:  Preforms all ADL's  by self with out difficulty     DME:   Wound VAC, [facility]  Resources/Support:   Patient has adequate support at home from brother, brothers girlfriend, and patients daughter and has no detectable resource needs at this time. AMD:   Not on file  Reconciled home medications and reviewed allergies. Instructed to bring all medications with him to next appointment. Educated patient to monitor and report the following Red flags:     · tempature greater than 100 or less than 97 degrees  · Swelling   · reddness  · Foul smelling drainage from incision site  · confusion   or any new or concerning symptoms. Patient verbalized understanding of information discussed and is aware of  when to seek medical attention from PCP, urgent care or ED. Opportunity to ask questions was provided. Contact information was provided for future reference or further questions.     Appointments:  7/17/2017 10:00 AM Susan Yu, [address] [place] [address]   7/26/2017 8:00 AM Gerard Joseph MD [facility] [address]   7/26/2017 11:45 AM [address] [place] [address] [facility] [address]     patient also has appointment with his Nephrologist 7/14/2017   Offered earlier appointment. Patient declined. No earlier appointment available. Patient preferred to see PCP. Patient aware of appointments. Daughter will provide transportation. Potential Barriers to care  No apparent barriers to care identified at this time. Adherence to previous treatment and likelihood for follow-up:  Patient verbalized understanding of discharge instructions and need for follow up. Plan of Care/Goals:  Goals        Patient Stated     Returns to baseline activity level. (pt-stated)            Patient will be able to return home to live independently      [place] to baseline activity level. (pt-stated)            Return back to work soon         Other     Attends follow-up appointments as directed. Patient will keep all scheduled appointments              This represents Transitions of Care. Nurse Navigator spoke with patient within 1-2 business day(s) of discharge. Patient's transition of care follow up appointment is scheduled with Derrick Chilel MD   on 7/17/17 at 1000 which is within 7-14 days of discharge.

## 2017-07-13 NOTE — TELEPHONE ENCOUNTER
Patient was over at [facility] for bleeding issue with his bypass in the thigh. Was operated on by Dr Marcus Renner team.   During discharge patients Amiodarone and Plavix got discontinued. Daughter is calling in to verify, since this doesn't sound right to her. Discussed patient with Dr Sarah De La Rosa. Dr Sarah De La Rosa gave verbal order to order:  Start Plavix 75 mg once a day and   Start Amiodarone 200mg  once a day   Stop Warfarin   and to see patient in 3-4 weeks. Patient's daughter was called informed about medication changes. Daughter verbalized understanding, but wants to keep 26 July appointment with Dr Sarah De La Rosa to make sure her father is on proper medication therapy.

## 2017-07-13 NOTE — TELEPHONE ENCOUNTER
Patient was over at [facility] for bleeding issue with his bypass in the thigh. Was operated on by Dr Latia Fox team.   During discharge patients Amiodarone and Plavix got discontinued. Daughter is calling in to verify, since this doesn't sound right to her.      Discussed patient with Dr Lucas Libman. Dr Lucas Libman gave verbal order to order:  Start Plavix 75 mg once a day and   Start Amiodarone 200mg  once a day   Stop Warfarin   and to see patient in 3-4 weeks.      Patient's daughter was called informed about medication changes.  Daughter verbalized understanding, but wants to keep 26 July appointment with Dr Lucas Libman to make sure her father is on proper medication therapy.

## 2017-07-14 ENCOUNTER — HOME CARE VISIT (OUTPATIENT)
Dept: SCHEDULING | Facility: HOME HEALTH | Age: 57
End: 2017-07-14
Payer: COMMERCIAL

## 2017-07-14 LAB
BASOPHILS # BLD AUTO: 0.1 X10E3/UL (ref 0–0.2)
BASOPHILS NFR BLD AUTO: 0 %
CREAT UR-MCNC: 131 MG/DL
EOSINOPHIL # BLD AUTO: 0.4 X10E3/UL (ref 0–0.4)
EOSINOPHIL NFR BLD AUTO: 3 %
ERYTHROCYTE [DISTWIDTH] IN BLOOD BY AUTOMATED COUNT: 16 % (ref 12.3–15.4)
HCT VFR BLD AUTO: 30 % (ref 37.5–51)
HGB BLD-MCNC: 9.7 G/DL (ref 12.6–17.7)
IMM GRANULOCYTES # BLD: 0 X10E3/UL (ref 0–0.1)
IMM GRANULOCYTES NFR BLD: 0 %
INTERPRETIVE COMMENT, 330017: NORMAL
LYMPHOCYTES # BLD AUTO: 1.9 X10E3/UL (ref 0.7–3.1)
LYMPHOCYTES NFR BLD AUTO: 14 %
MCH RBC QN AUTO: 29 PG (ref 26.6–33)
MCHC RBC AUTO-ENTMCNC: 32.3 G/DL (ref 31.5–35.7)
MCV RBC AUTO: 90 FL (ref 79–97)
MONOCYTES # BLD AUTO: 1.1 X10E3/UL (ref 0.1–0.9)
MONOCYTES NFR BLD AUTO: 8 %
NEUTROPHILS # BLD AUTO: 10.6 X10E3/UL (ref 1.4–7)
NEUTROPHILS NFR BLD AUTO: 75 %
PLATELET # BLD AUTO: 737 X10E3/UL (ref 150–379)
PROT UR-MCNC: 39 MG/DL
PROT/CREAT UR: 298 MG/G CREAT (ref 0–200)
RBC # BLD AUTO: 3.34 X10E6/UL (ref 4.14–5.8)
T4 FREE SERPL-MCNC: 1.17 NG/DL (ref 0.82–1.77)
THYROPEROXIDASE AB SERPL-ACNC: 18 IU/ML (ref 0–34)
TSH SERPL DL<=0.005 MIU/L-ACNC: 5.97 UIU/ML (ref 0.45–4.5)
WBC # BLD AUTO: 14.1 X10E3/UL (ref 3.4–10.8)

## 2017-07-14 PROCEDURE — G0495 RN CARE TRAIN/EDU IN HH: HCPCS

## 2017-07-14 PROCEDURE — A6223 GAUZE >16<=48 NO W/SAL W/O B: HCPCS

## 2017-07-14 PROCEDURE — A4385 OST SKN BARRIER SLD EXT WEAR: HCPCS

## 2017-07-14 PROCEDURE — 400013 HH SOC

## 2017-07-14 PROCEDURE — G0299 HHS/HOSPICE OF RN EA 15 MIN: HCPCS

## 2017-07-14 PROCEDURE — A6216 NON-STERILE GAUZE<=16 SQ IN: HCPCS

## 2017-07-14 PROCEDURE — A4649 SURGICAL SUPPLIES: HCPCS

## 2017-07-17 ENCOUNTER — OFFICE VISIT (OUTPATIENT)
Dept: FAMILY MEDICINE CLINIC | Age: 57
End: 2017-07-17

## 2017-07-17 ENCOUNTER — OFFICE VISIT (OUTPATIENT)
Dept: VASCULAR SURGERY | Age: 57
End: 2017-07-17

## 2017-07-17 ENCOUNTER — HOME CARE VISIT (OUTPATIENT)
Dept: HOME HEALTH SERVICES | Facility: HOME HEALTH | Age: 57
End: 2017-07-17
Payer: COMMERCIAL

## 2017-07-17 VITALS
DIASTOLIC BLOOD PRESSURE: 72 MMHG | SYSTOLIC BLOOD PRESSURE: 122 MMHG | HEIGHT: 70 IN | RESPIRATION RATE: 16 BRPM | OXYGEN SATURATION: 98 % | WEIGHT: 156 LBS | TEMPERATURE: 97.6 F | HEART RATE: 62 BPM | BODY MASS INDEX: 22.33 KG/M2

## 2017-07-17 DIAGNOSIS — I72.4 PSEUDOANEURYSM OF FEMORAL ARTERY (HCC): Primary | ICD-10-CM

## 2017-07-17 DIAGNOSIS — D75.839 THROMBOCYTOSIS: ICD-10-CM

## 2017-07-17 DIAGNOSIS — I73.9 PAD (PERIPHERAL ARTERY DISEASE) (HCC): ICD-10-CM

## 2017-07-17 DIAGNOSIS — E07.81 EUTHYROID SICK SYNDROME: ICD-10-CM

## 2017-07-17 DIAGNOSIS — I25.10 CORONARY ARTERY DISEASE INVOLVING NATIVE CORONARY ARTERY OF NATIVE HEART WITHOUT ANGINA PECTORIS: Chronic | ICD-10-CM

## 2017-07-17 NOTE — PATIENT INSTRUCTIONS
Peripheral Arterial Disease of the Leg: Care Instructions  Your Care Instructions  Peripheral arterial disease (PAD) occurs when the blood vessels (arteries) that supply blood to the legs, belly, pelvis, arms, or neck get too narrow. This reduces blood flow to that area. The legs are affected most often. Fatty buildup (plaque) in the arteries usually is the cause of PAD. This buildup is also called \"hardening\" of the arteries. Your risk of PAD increases if you smoke or have high cholesterol, high blood pressure, diabetes, or a family history of PAD. One of the main symptoms of PAD is intermittent claudication. This is a tight, aching, or squeezing pain in the calf, foot, thigh, or buttock that occurs during exercise. The pain usually gets worse during exercise and goes away when you rest. But as PAD gets worse, you may feel pain even at rest.  Medicines and lifestyle changes may help your symptoms and lower your risk of heart attack and stroke. In some cases, surgery or other treatment is needed. It is important that you follow up with your doctor. Follow-up care is a key part of your treatment and safety. Be sure to make and go to all appointments, and call your doctor if you are having problems. It's also a good idea to know your test results and keep a list of the medicines you take. How can you care for yourself at home? · Do not smoke. Smoking can make PAD worse. If you need help quitting, talk to your doctor about stop-smoking programs and medicines. These can increase your chances of quitting for good. · Take your medicines exactly as prescribed. Call your doctor if you think you are having a problem with your medicine. · If you take a blood thinner, such as aspirin, be sure to get instructions about how to take your medicine safely. Blood thinners can cause serious bleeding problems. · Ask your doctor if a cardiac rehab program is right for you. Cardiac rehab can help you make lifestyle changes. In cardiac rehab, a team of health professionals provides education and support to help you make new, healthy habits. · Eat heart-healthy foods such as fruits, vegetables, whole grains, fish, lean meats, and low-fat or nonfat dairy foods. Limit sodium, sugar, and alcohol. · If your doctor recommends it, get more exercise. Walking is a good choice. Bit by bit, increase the amount you walk every day. Try for at least 30 minutes on most days of the week. · Stay at a healthy weight. Lose weight if you need to. · Take good care of your feet. ¨ Treat cuts and scrapes on your legs right away. Poor blood flow prevents (or slows) quick healing of even small cuts or scrapes. This is even more important if you have diabetes. ¨ Avoid shoes that are too tight or that rub your feet. Shoes should be comfortable and fit well. ¨ Avoid socks or stockings that are tight enough to leave elastic-band marks on your legs. Tight socks can make circulation problems worse. ¨ Keep your feet clean and moisturized to prevent drying and cracking. Place cotton or lamb's wool between your toes to prevent rubbing and to absorb moisture. ¨ If you have a sore on your leg or foot, keep it dry and cover it with a nonstick bandage until you see your doctor. When should you call for help? Call 911 anytime you think you may need emergency care. For example, call if:  · You have symptoms of a heart attack. These may include:  ¨ Chest pain or pressure, or a strange feeling in the chest.  ¨ Sweating. ¨ Shortness of breath. ¨ Nausea or vomiting. ¨ Pain, pressure, or a strange feeling in the back, neck, jaw, or upper belly or in one or both shoulders or arms. ¨ Lightheadedness or sudden weakness. ¨ A fast or irregular heartbeat. After you call 911, the  may tell you to chew 1 adult-strength or 2 to 4 low-dose aspirin. Wait for an ambulance. Do not try to drive yourself.   · You have sudden, severe leg pain, and your leg is cool and pale.  · You have symptoms of a stroke. These may include:  ¨ Sudden numbness, tingling, weakness, or loss of movement in your face, arm, or leg, especially on only one side of your body. ¨ Sudden vision changes. ¨ Sudden trouble speaking. ¨ Sudden confusion or trouble understanding simple statements. ¨ Sudden problems with walking or balance. ¨ A sudden, severe headache that is different from past headaches. Call your doctor now or seek immediate medical care if:  · You have leg pain that does not go away even if you rest.  · Your leg pain changes or gets worse. For example, if you have more pain with normal activity or the same pain with decreased activity, you should call. · You have an open sore on your leg or foot that is infected. Signs of infection include:  ¨ Increased pain, swelling, warmth, or redness. ¨ Red streaks leading from the sore. ¨ Pus draining from the sore. ¨ A fever. Watch closely for changes in your health, and be sure to contact your doctor if you have any problems. Where can you learn more? Go to http://yuniel-lyndsay.info/. Enter [id number] [id number] in the search box to learn more about \"Peripheral Arterial Disease of the Leg: Care Instructions. \"  Current as of: July 19, 2016  Content Version: 11.3  © 0648-7526 [employer]. Care instructions adapted under license by [employer] (which disclaims liability or warranty for this information). If you have questions about a medical condition or this instruction, always ask your healthcare professional. [place] [address] any warranty or liability for your use of this information.

## 2017-07-17 NOTE — MR AVS SNAPSHOT
Visit Information Date & Time Provider Department Dept. Phone Encounter #  
 7/17/2017 10:00 AM Kaleb García, [address] [id number] Follow-up Instructions Return in about 6 months (around 1/17/2018), or if symptoms worsen or fail to improve. Your Appointments 7/26/2017  8:00 AM  
Follow Up with Moises Pabon MD  
[facility] ([facility]) Appt Note: 1 mth f/up; 6/8/17 - lmom to r/s june appt. provider out of office. Nanette Camarena; 1 mth f/up/r/s'd with the patient's daughter- provider out of the office- Luz Maria Call; $ 75. copay/$0 balance; 1 mth f/up/r/s'd with the patient's daughter- provider out of the office- [facility] Kelly Selwyn Darby [phone] [phone] Valverde Lilly  
  
    
 7/26/2017 [address] with [address] [facility] ([facility]) Appt Note: [place] [facility] called and scheduled appt; r/s  
 [address], Catherine Dad [phone] [facility]  
[phone] [address] [facility] 200 [facility] [address] Upcoming Health Maintenance Date Due Pneumococcal 19-64 Medium Risk (1 of 1 - PPSV23) [birth year]/22/1979 INFLUENZA AGE 9 TO ADULT 8/1/2017 COLONOSCOPY 7/23/2020 DTaP/Tdap/Td series (2 - Td) 7/5/2026 Allergies as of 7/17/2017  Review Complete On: 7/17/2017 By: Yates Goldberg, LPN No Known Allergies Current Immunizations  Reviewed on 7/5/2016 Name Date Tdap 7/5/2016 Not reviewed this visit You Were Diagnosed With   
  
 Codes Comments Pseudoaneurysm of femoral artery (HCC)    -  Primary ICD-10-CM: I72.4 ICD-9-CM: 417. 3 Thrombocytosis ([facility] [address]     ICD-10-CM: D47.3 ICD-9-CM: 238.71 PAD (peripheral artery disease) (HCC)     ICD-10-CM: I73.9 ICD-9-CM: 443.9 Coronary artery disease involving native coronary artery of native heart without angina pectoris     ICD-10-CM: I25.10 ICD-9-CM: 414.01 Euthyroid sick syndrome     ICD-10-CM: E07.81 ICD-9-CM: 790.94 Vitals BP Pulse Temp Resp Height(growth percentile) Weight(growth percentile) 122/72 (BP 1 Location: Left arm, BP Patient Position: Sitting) 62 97.6 °F (36.4 °C) (Oral) 16 5' 10\" (1.778 m) 156 lb (70.8 kg) SpO2 BMI Smoking Status 98% 22.38 kg/m2 Former Smoker BMI and BSA Data Body Mass Index Body Surface Area  
 22.38 kg/m 2 1.87 m 2 Preferred Pharmacy Pharmacy Name Phone [address] [facility], [address] Your Updated Medication List  
  
   
This list is accurate as of: 7/17/17 10:30 AM.  Always use your most recent med list.  
  
  
  
  
 amiodarone 200 mg tablet Commonly known as:  CORDARONE Take 1 Tab by mouth daily. Indications: VENTRICULAR RATE CONTROL IN ATRIAL FIBRILLATION  
  
 ascorbic acid (vitamin C) 250 mg tablet Commonly known as:  VITAMIN C Take 1 Tab by mouth daily (with breakfast). aspirin 81 mg chewable tablet Take 1 Tab by mouth daily (with breakfast). atorvastatin 40 mg tablet Commonly known as:  LIPITOR Take 1 Tab by mouth nightly. Indications: DYSLIPIDEMIA  
  
 cholecalciferol 1,000 unit tablet Commonly known as:  VITAMIN D3 Take 1 Tab by mouth daily. clopidogrel 75 mg Tab Commonly known as:  PLAVIX Take 1 Tab by mouth daily. cyanocobalamin 1,000 mcg tablet Commonly known as:  VITAMIN B-12 Take 1 Tab by mouth daily. dilTIAZem 30 mg tablet Commonly known as:  CARDIZEM Take 1 Tab by mouth Before breakfast, lunch, dinner and at bedtime. Indications: hypertension DULoxetine 60 mg capsule Commonly known as:  CYMBALTA Take 1 Cap by mouth daily. ferrous sulfate 325 mg (65 mg iron) tablet Take 1 Tab by mouth daily (with breakfast). gabapentin 100 mg capsule Commonly known as:  NEURONTIN Take 1 Cap by mouth two (2) times a day. Indications: NEUROPATHIC PAIN  
  
 losartan 25 mg tablet Commonly known as:  COZAAR Take 1 Tab by mouth daily. Indications: Chronic Heart Failure, hypertension  
  
 metoprolol tartrate 25 mg tablet Commonly known as:  LOPRESSOR Take 0.5 Tabs by mouth every twelve (12) hours. Indications: hypertension, VENTRICULAR RATE CONTROL IN ATRIAL FIBRILLATION  
  
 oxyCODONE-acetaminophen 5-325 mg per tablet Commonly known as:  PERCOCET Take 2 Tabs by mouth every four (4) hours as needed. Max Daily Amount: 12 Tabs. zinc sulfate 220 (50) mg capsule Commonly known as:  ZINCATE Take 1 Cap by mouth daily. Follow-up Instructions Return in about 6 months (around 1/17/2018), or if symptoms worsen or fail to improve. To-Do List   
 07/19/2017 To Be Determined Appointment with Zoila Lemos at [address] [place] [address] MED CTR  
  
 07/19/2017 To Be Determined Appointment with Kat Raymundo RN at [address] [place] [address] MED CTR  
  
 07/21/2017 To Be Determined Appointment with Kat Raymundo RN at [address] [place] [address] MED CTR  
  
 07/24/2017 To Be Determined Appointment with Kat Raymundo RN at [address] [place] [address] MED CTR  
  
 07/26/2017 To Be Determined Appointment with Kat Raymundo RN at [address] [place] [address] MED CTR  
  
 07/28/2017 To Be Determined Appointment with Kat Raymundo RN at [address] [place] [address] MED CTR  
  
 07/31/2017 To Be Determined Appointment with Kat Raymundo RN at [address] [place] [address] MED CTR  
  
 08/02/2017 To Be Determined Appointment with Getachew De Guzman RN at [address] [facility]  
  
 08/04/2017 To Be Determined Appointment with Getachew De Guzman RN at [address] [facility]  
  
 08/07/2017 To Be Determined Appointment with Getachew De Guzman RN at [address] [facility]  
  
 08/09/2017 To Be Determined Appointment with Getachew De Guzman RN at [address] [facility]  
  
 08/11/2017 To Be Determined Appointment with Getachew De Guzman RN at [address] [facility]  
  
 08/14/2017 To Be Determined Appointment with Getachew De Guzman RN at [address] [facility]  
  
 08/16/2017 To Be Determined Appointment with Getachew De Guzman RN at [address] [facility]  
  
 08/18/2017 To Be Determined Appointment with Getachew De Guzman RN at [address] [facility]  
  
 08/21/2017 To Be Determined Appointment with Getachew De Guzman RN at [address] [facility]  
  
 08/23/2017 To Be Determined Appointment with Getachew De Guzman RN at [address] [facility]  
  
 08/25/2017 To Be Determined Appointment with Getachew De Guzman RN at [address] [facility]  
  
 08/28/2017 To Be Determined Appointment with Getachew De Guzman RN at [address] [facility]  
  
 08/30/2017 To Be Determined Appointment with Getachew De Guzman RN at [address] [facility]  
  
 09/01/2017 To Be Determined Appointment with Getachew De Guzman RN at [address] [facility]  
  
 09/04/2017 To Be Determined Appointment with Angelina Collins RN at [address] [facility]  
  
 09/06/2017 To Be Determined Appointment with Angelina Collins RN at [address] [facility]  
  
 09/08/2017 To Be Determined Appointment with Angelina Collins RN at [facility]  Patient Instructions Peripheral Arterial Disease of the Leg: Care Instructions Your Care Instructions Peripheral arterial disease (PAD) occurs when the blood vessels (arteries) that supply blood to the legs, belly, pelvis, arms, or neck get too narrow. This reduces blood flow to that area. The legs are affected most often. Fatty buildup (plaque) in the arteries usually is the cause of PAD. This buildup is also called \"hardening\" of the arteries. Your risk of PAD increases if you smoke or have high cholesterol, high blood pressure, diabetes, or a family history of PAD. One of the main symptoms of PAD is intermittent claudication. This is a tight, aching, or squeezing pain in the calf, foot, thigh, or buttock that occurs during exercise. The pain usually gets worse during exercise and goes away when you rest. But as PAD gets worse, you may feel pain even at rest. 
Medicines and lifestyle changes may help your symptoms and lower your risk of heart attack and stroke. In some cases, surgery or other treatment is needed. It is important that you follow up with your doctor. Follow-up care is a key part of your treatment and safety. Be sure to make and go to all appointments, and call your doctor if you are having problems. It's also a good idea to know your test results and keep a list of the medicines you take. How can you care for yourself at home? · Do not smoke. Smoking can make PAD worse. If you need help quitting, talk to your doctor about stop-smoking programs and medicines.  These can increase your chances of quitting for good. · Take your medicines exactly as prescribed. Call your doctor if you think you are having a problem with your medicine. · If you take a blood thinner, such as aspirin, be sure to get instructions about how to take your medicine safely. Blood thinners can cause serious bleeding problems. · Ask your doctor if a cardiac rehab program is right for you. Cardiac rehab can help you make lifestyle changes. In cardiac rehab, a team of health professionals provides education and support to help you make new, healthy habits. · Eat heart-healthy foods such as fruits, vegetables, whole grains, fish, lean meats, and low-fat or nonfat dairy foods. Limit sodium, sugar, and alcohol. · If your doctor recommends it, get more exercise. Walking is a good choice. Bit by bit, increase the amount you walk every day. Try for at least 30 minutes on most days of the week. · Stay at a healthy weight. Lose weight if you need to. · Take good care of your feet. ¨ Treat cuts and scrapes on your legs right away. Poor blood flow prevents (or slows) quick healing of even small cuts or scrapes. This is even more important if you have diabetes. ¨ Avoid shoes that are too tight or that rub your feet. Shoes should be comfortable and fit well. ¨ Avoid socks or stockings that are tight enough to leave elastic-band marks on your legs. Tight socks can make circulation problems worse. ¨ Keep your feet clean and moisturized to prevent drying and cracking. Place cotton or lamb's wool between your toes to prevent rubbing and to absorb moisture. ¨ If you have a sore on your leg or foot, keep it dry and cover it with a nonstick bandage until you see your doctor. When should you call for help? Call 911 anytime you think you may need emergency care. For example, call if: 
· You have symptoms of a heart attack. These may include: ¨ Chest pain or pressure, or a strange feeling in the chest. 
¨ Sweating. ¨ Shortness of breath. ¨ Nausea or vomiting. ¨ Pain, pressure, or a strange feeling in the back, neck, jaw, or upper belly or in one or both shoulders or arms. ¨ Lightheadedness or sudden weakness. ¨ A fast or irregular heartbeat. After you call 911, the  may tell you to chew 1 adult-strength or 2 to 4 low-dose aspirin. Wait for an ambulance. Do not try to drive yourself. · You have sudden, severe leg pain, and your leg is cool and pale. · You have symptoms of a stroke. These may include: 
¨ Sudden numbness, tingling, weakness, or loss of movement in your face, arm, or leg, especially on only one side of your body. ¨ Sudden vision changes. ¨ Sudden trouble speaking. ¨ Sudden confusion or trouble understanding simple statements. ¨ Sudden problems with walking or balance. ¨ A sudden, severe headache that is different from past headaches. Call your doctor now or seek immediate medical care if: 
· You have leg pain that does not go away even if you rest. 
· Your leg pain changes or gets worse. For example, if you have more pain with normal activity or the same pain with decreased activity, you should call. · You have an open sore on your leg or foot that is infected. Signs of infection include: 
¨ Increased pain, swelling, warmth, or redness. ¨ Red streaks leading from the sore. ¨ Pus draining from the sore. ¨ A fever. Watch closely for changes in your health, and be sure to contact your doctor if you have any problems. Where can you learn more? Go to http://yuniel-lyndsay.info/. Enter [phone] in the search box to learn more about \"Peripheral Arterial Disease of the Leg: Care Instructions. \" Current as of: July 19, 2016 Content Version: 11.3 © 2604-1335 [employer]. Care instructions adapted under license by [employer] (which disclaims liability or warranty for this information).  If you have questions about a medical condition or this instruction, always ask your healthcare professional. [address] any warranty or liability for your use of this information. Please provide this summary of care documentation to your next provider. Your primary care clinician is listed as Delisa Flores. If you have any questions after today's visit, please call [phone].

## 2017-07-17 NOTE — PROGRESS NOTES
Kellie Gonzalez, 59 y.o.,  male    Saravanan Bragg ff-up femoral artery aneurysm    Date of admission to discharge 6/27/17-7/2/17    Reviewed discharge summary:  Pt w/ h/o PAD, s/p recent bypass. 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 Complicated with acute blood loss, multiple transfusions. There was worsening thrombocytosis, dr. Geovany Rodriguez consulted thinking reactive from blood loss, discharge Platelet >185B, rec'd po fe. Has outpatient ff-up next week. He has wound vac, and home health nurse for wound care instituted. He has appt with dr. Santos Laurent next week as well. He says energy level improving, appetite is normal. Her reports improvement off pain, weaning off his analgesics. He is off coumadin for chronic afib currently   Currently on asa/plavix. Has appt upcoming appt with dr. Edil Pearce. Podiatry continues to follow chronic ulcers of R foot.      ROS:  See HPI, all others negative        Patient Active Problem List   Diagnosis Code    Benign hypertensive heart disease with systolic congestive heart failure, NYHA class 2 ([facility]) I11.0, I50.20    Vitamin B12 deficiency E53.8    DDD (degenerative disc disease), lumbar M51.36    Erectile dysfunction N52.9    Spinal stenosis of lumbar region with radiculopathy M48.06, M54.16    Hereditary peripheral neuropathy G60.9    Aortoiliac occlusive disease ([address] I74.09    Atherosclerosis of native artery of both lower extremities with intermittent claudication ([address] I70.213    Peripheral artery disease ([address] I73.9    Coronary artery disease involving native coronary artery of native heart I25.10    Chronic atrial fibrillation (HCC) I48.2    Transient alteration of awareness R40.4    Acute blood loss as cause of postoperative anemia D62    Impaired mobility and ADLs Z74.09    Anticoagulated on Coumadin Z51.81, Z79.01    Ischemic cardiomyopathy I25.5    Chronic systolic heart failure (HCC) I50.22    Carotid artery disease ([facility]) I77.9    Hyperammonemia ([facility]) E72.20    Dyslipidemia E78.5    Hyperuricemia E79.0    Euthyroid sick syndrome E07.81    Aftercare following surgery of the circulatory system Z48.812    Status post femoral-popliteal bypass surgery Z95.828    History of cardioversion Z98.890    Critical ischemia of lower extremity I99.8    Chronic ulcer of right foot ([facility]) L97.519    Vitamin D deficiency E55.9    PAD (peripheral artery disease) ([facility]) I73.9    Pseudoaneurysm of femoral artery ([facility]) I72.4       Current Outpatient Prescriptions   Medication Sig Dispense Refill    amiodarone (CORDARONE) 200 mg tablet Take 1 Tab by mouth daily. Indications: VENTRICULAR RATE CONTROL IN ATRIAL FIBRILLATION 60 Tab 0    clopidogrel (PLAVIX) 75 mg tab Take 1 Tab by mouth daily. 90 Tab 3    oxyCODONE-acetaminophen (PERCOCET) 5-325 mg per tablet Take 2 Tabs by mouth every four (4) hours as needed. Max Daily Amount: 12 Tabs. 60 Tab 0    gabapentin (NEURONTIN) 100 mg capsule Take 1 Cap by mouth two (2) times a day. Indications: NEUROPATHIC PAIN 60 Cap 9    aspirin 81 mg chewable tablet Take 1 Tab by mouth daily (with breakfast). 90 Tab 3    cholecalciferol (VITAMIN D3) 1,000 unit tablet Take 1 Tab by mouth daily. 90 Tab 3    losartan (COZAAR) 25 mg tablet Take 1 Tab by mouth daily. Indications: Chronic Heart Failure, hypertension 90 Tab 2    ascorbic acid, vitamin C, (VITAMIN C) 250 mg tablet Take 1 Tab by mouth daily (with breakfast). 90 Tab 2    DULoxetine (CYMBALTA) 60 mg capsule Take 1 Cap by mouth daily. 90 Cap 2    zinc sulfate (ZINCATE) 220 (50) mg capsule Take 1 Cap by mouth daily. 90 Cap 0    ferrous sulfate 325 mg (65 mg iron) tablet Take 1 Tab by mouth daily (with breakfast). 90 Tab 3    atorvastatin (LIPITOR) 40 mg tablet Take 1 Tab by mouth nightly. Indications: DYSLIPIDEMIA 90 Tab 3    metoprolol tartrate (LOPRESSOR) 25 mg tablet Take 0.5 Tabs by mouth every twelve (12) hours.  Indications: hypertension, VENTRICULAR RATE CONTROL IN ATRIAL FIBRILLATION 30 Tab 6    dilTIAZem (CARDIZEM) 30 mg tablet Take 1 Tab by mouth Before breakfast, lunch, dinner and at bedtime. Indications: hypertension 120 Tab 6    cyanocobalamin (VITAMIN B-12) 1,000 mcg tablet Take 1 Tab by mouth daily. 80 Tab 3       No Known Allergies    Past Medical History:   Diagnosis Date    Acute blood loss as cause of postoperative anemia 4/4/2017    Anticoagulated on Coumadin     Aortoiliac occlusive disease (HCC) 1/25/2017    Atherosclerosis of native artery of both lower extremities with intermittent claudication ([facility] [address] 1/25/2017    Benign hypertensive heart disease with systolic congestive heart failure, NYHA class 2 ([facility] [address] 1/26/2015    Carotid artery disease (HCC)     Chronic atrial fibrillation (HCC)     Chronic systolic heart failure (HCC)     Chronic ulcer of right foot ([facility] [address] 4/4/2017    Coronary artery disease involving native coronary artery of native heart 3/15/2017    Successful stenting of Cx (Xience LESELY) and RCA (Xience LESLEY) to 0% by Dr. Catherine Nye on 3/15/17.  DDD (degenerative disc disease), lumbar 1/26/2015    Dyslipidemia     Erectile dysfunction 7/5/2016    Euthyroid sick syndrome 4/6/2017    Hereditary peripheral neuropathy 11/15/2016    History of cardioversion 4/18/2017    S/P Synchronized external cardioversion (4/18/2017 - Dr. Anay Miller)    Hypertension     Hyperuricemia     Ischemic cardiomyopathy     Peripheral artery disease ([facility] [address] 1/25/2017    Spinal stenosis of lumbar region with radiculopathy 5/4/2015    Dr. Eloise Kebede Vitamin D deficiency 4/22/2017    Vitamin D 25-Hydroxy (4/22/2017) = 12.0       Social History     Social History    Marital status: LEGALLY      Spouse name: N/A    Number of children: N/A    Years of education: N/A     Occupational History    Not on file.      Social History Main Topics    Smoking status: Former Smoker    Smokeless tobacco: Never Used Comment: quit in 2011    Alcohol use 1.2 oz/week     2 Cans of beer per week      Comment: 10 cans of beer a month    Drug use: Yes     Special: Marijuana      Comment: occasionally    Sexual activity: Yes     Partners: Female     Other Topics Concern    Not on file     Social History Narrative       Family History   Problem Relation Age of Onset    Heart Disease Father          OBJECTIVE    Physical Exam:     Visit Vitals    /72 (BP 1 Location: Left arm, BP Patient Position: Sitting)    Pulse 62    Temp 97.6 °F (36.4 °C) (Oral)    Resp 16    Ht 5' 10\" (1.778 m)    Wt 156 lb (70.8 kg)    SpO2 98%    BMI 22.38 kg/m2       General: alert, chronically ill appearing,  in no apparent distress or pain  Neck: supple, no adenopathy palpated  CVS: normal rate, regular rhythm, distinct S1 and S2  Lungs:clear to ausculation bilaterally, no crackles, wheezing or rhonchi noted  Abdomen: normoactive bowel sounds, soft, non-tender  Extremities: L groin wound vac in place. R foot dressing clean an dry. Skin: warm, no lesions, rashes noted  Psych:  mood and affect normal        ASSESSMENT/PLAN  Ema Wright was seen today for hospital follow up. Diagnoses and all orders for this visit:    Pseudoaneurysm of femoral artery (HCC)  S/p ligation, complicated with acute bleeding requiring multiple prbc transfusion    Thrombocytosis (HCC)  Heme is following, thinking reactive from acute blood loss  Has ff-up appt with dr. Lake Newman next week    PAD (peripheral artery disease) ([facility] [address]  S/p bypass  On asa/plavix/statin  Following dr. Davee Dance  Coronary artery disease involving native coronary artery of native heart without angina pectoris  Asymptomatic  Following dr douglas    Euthyroid sick syndrome  monitoring      Follow-up Disposition:  Return in about 6 months (around 1/17/2018), or if symptoms worsen or fail to improve. Patient understands plan of care. Patient has provided input and agrees with goals.

## 2017-07-18 ENCOUNTER — HOME CARE VISIT (OUTPATIENT)
Dept: SCHEDULING | Facility: HOME HEALTH | Age: 57
End: 2017-07-18
Payer: COMMERCIAL

## 2017-07-18 ENCOUNTER — HOME CARE VISIT (OUTPATIENT)
Dept: HOME HEALTH SERVICES | Facility: HOME HEALTH | Age: 57
End: 2017-07-18
Payer: COMMERCIAL

## 2017-07-18 VITALS
SYSTOLIC BLOOD PRESSURE: 120 MMHG | RESPIRATION RATE: 18 BRPM | HEART RATE: 57 BPM | DIASTOLIC BLOOD PRESSURE: 70 MMHG | TEMPERATURE: 96.1 F | OXYGEN SATURATION: 98 %

## 2017-07-18 PROCEDURE — G0299 HHS/HOSPICE OF RN EA 15 MIN: HCPCS

## 2017-07-19 ENCOUNTER — HOME CARE VISIT (OUTPATIENT)
Dept: HOME HEALTH SERVICES | Facility: HOME HEALTH | Age: 57
End: 2017-07-19
Payer: COMMERCIAL

## 2017-07-19 VITALS
HEART RATE: 62 BPM | TEMPERATURE: 98.6 F | RESPIRATION RATE: 20 BRPM | OXYGEN SATURATION: 98 % | DIASTOLIC BLOOD PRESSURE: 70 MMHG | SYSTOLIC BLOOD PRESSURE: 98 MMHG

## 2017-07-20 ENCOUNTER — HOME CARE VISIT (OUTPATIENT)
Dept: HOME HEALTH SERVICES | Facility: HOME HEALTH | Age: 57
End: 2017-07-20
Payer: COMMERCIAL

## 2017-07-20 ENCOUNTER — HOME CARE VISIT (OUTPATIENT)
Dept: SCHEDULING | Facility: HOME HEALTH | Age: 57
End: 2017-07-20
Payer: COMMERCIAL

## 2017-07-20 PROCEDURE — G0299 HHS/HOSPICE OF RN EA 15 MIN: HCPCS

## 2017-07-21 ENCOUNTER — DOCUMENTATION ONLY (OUTPATIENT)
Dept: VASCULAR SURGERY | Age: 57
End: 2017-07-21

## 2017-07-21 PROCEDURE — A5120 SKIN BARRIER, WIPE OR SWAB: HCPCS

## 2017-07-21 NOTE — PROGRESS NOTES
Daughter called and patient is very diaphoretic and doesn't feel good, daughter states no fever. Advised the daughter to get dad to the ED for evaluation.

## 2017-07-22 ENCOUNTER — HOME CARE VISIT (OUTPATIENT)
Dept: HOME HEALTH SERVICES | Facility: HOME HEALTH | Age: 57
End: 2017-07-22
Payer: COMMERCIAL

## 2017-07-24 ENCOUNTER — HOSPITAL ENCOUNTER (INPATIENT)
Age: 57
LOS: 14 days | Discharge: NURSING, REHAB OR LONG-TERM CARE | DRG: 252 | End: 2017-08-07
Attending: SURGERY | Admitting: SURGERY
Payer: COMMERCIAL

## 2017-07-24 VITALS
OXYGEN SATURATION: 99 % | SYSTOLIC BLOOD PRESSURE: 111 MMHG | TEMPERATURE: 98.3 F | DIASTOLIC BLOOD PRESSURE: 55 MMHG | HEART RATE: 59 BPM | RESPIRATION RATE: 20 BRPM

## 2017-07-24 PROBLEM — A41.9 SEPSIS (HCC): Status: ACTIVE | Noted: 2017-07-24

## 2017-07-24 LAB
ANION GAP BLD CALC-SCNC: 10 MMOL/L (ref 3–18)
BUN SERPL-MCNC: 11 MG/DL (ref 7–18)
BUN/CREAT SERPL: 13 (ref 12–20)
CALCIUM SERPL-MCNC: 7.9 MG/DL (ref 8.5–10.1)
CHLORIDE SERPL-SCNC: 101 MMOL/L (ref 100–108)
CO2 SERPL-SCNC: 22 MMOL/L (ref 21–32)
CREAT SERPL-MCNC: 0.82 MG/DL (ref 0.6–1.3)
GLUCOSE SERPL-MCNC: 156 MG/DL (ref 74–99)
LACTATE SERPL-SCNC: 1.8 MMOL/L (ref 0.4–2)
POTASSIUM SERPL-SCNC: 4 MMOL/L (ref 3.5–5.5)
SODIUM SERPL-SCNC: 133 MMOL/L (ref 136–145)
VANCOMYCIN SERPL-MCNC: 15.5 UG/ML (ref 5–40)

## 2017-07-24 PROCEDURE — 74011000258 HC RX REV CODE- 258: Performed by: PHYSICIAN ASSISTANT

## 2017-07-24 PROCEDURE — 83605 ASSAY OF LACTIC ACID: CPT | Performed by: PHYSICIAN ASSISTANT

## 2017-07-24 PROCEDURE — 74011250636 HC RX REV CODE- 250/636: Performed by: PHYSICIAN ASSISTANT

## 2017-07-24 PROCEDURE — 87040 BLOOD CULTURE FOR BACTERIA: CPT | Performed by: PHYSICIAN ASSISTANT

## 2017-07-24 PROCEDURE — 87186 SC STD MICRODIL/AGAR DIL: CPT | Performed by: PHYSICIAN ASSISTANT

## 2017-07-24 PROCEDURE — 74011250637 HC RX REV CODE- 250/637: Performed by: PHYSICIAN ASSISTANT

## 2017-07-24 PROCEDURE — 77030011256 HC DRSG MEPILEX <16IN NO BORD MOLN -A

## 2017-07-24 PROCEDURE — 87077 CULTURE AEROBIC IDENTIFY: CPT | Performed by: PHYSICIAN ASSISTANT

## 2017-07-24 PROCEDURE — 36415 COLL VENOUS BLD VENIPUNCTURE: CPT | Performed by: PHYSICIAN ASSISTANT

## 2017-07-24 PROCEDURE — 80048 BASIC METABOLIC PNL TOTAL CA: CPT | Performed by: PHYSICIAN ASSISTANT

## 2017-07-24 PROCEDURE — 65610000006 HC RM INTENSIVE CARE

## 2017-07-24 PROCEDURE — 80202 ASSAY OF VANCOMYCIN: CPT | Performed by: PHYSICIAN ASSISTANT

## 2017-07-24 RX ORDER — NALOXONE HYDROCHLORIDE 0.4 MG/ML
0.4 INJECTION, SOLUTION INTRAMUSCULAR; INTRAVENOUS; SUBCUTANEOUS AS NEEDED
Status: DISCONTINUED | OUTPATIENT
Start: 2017-07-24 | End: 2017-07-25 | Stop reason: SDUPTHER

## 2017-07-24 RX ORDER — ASCORBIC ACID 250 MG
250 TABLET ORAL DAILY
Status: DISCONTINUED | OUTPATIENT
Start: 2017-07-25 | End: 2017-08-07 | Stop reason: HOSPADM

## 2017-07-24 RX ORDER — METOPROLOL TARTRATE 25 MG/1
12.5 TABLET, FILM COATED ORAL EVERY 12 HOURS
Status: DISCONTINUED | OUTPATIENT
Start: 2017-07-24 | End: 2017-08-07 | Stop reason: HOSPADM

## 2017-07-24 RX ORDER — ATORVASTATIN CALCIUM 40 MG/1
40 TABLET, FILM COATED ORAL
Status: ON HOLD | COMMUNITY
Start: 2017-06-02 | End: 2017-07-24

## 2017-07-24 RX ORDER — DULOXETIN HYDROCHLORIDE 60 MG/1
60 CAPSULE, DELAYED RELEASE ORAL DAILY
Status: DISCONTINUED | OUTPATIENT
Start: 2017-07-25 | End: 2017-08-07 | Stop reason: HOSPADM

## 2017-07-24 RX ORDER — ASCORBIC ACID 250 MG
250 TABLET ORAL
Status: ON HOLD | COMMUNITY
Start: 2017-06-02 | End: 2017-07-27 | Stop reason: CLARIF

## 2017-07-24 RX ORDER — LANOLIN ALCOHOL/MO/W.PET/CERES
325 CREAM (GRAM) TOPICAL
Status: ON HOLD | COMMUNITY
Start: 2017-06-02 | End: 2017-07-27 | Stop reason: CLARIF

## 2017-07-24 RX ORDER — AMIODARONE HYDROCHLORIDE 200 MG/1
200 TABLET ORAL DAILY
Status: DISCONTINUED | OUTPATIENT
Start: 2017-07-25 | End: 2017-08-07 | Stop reason: HOSPADM

## 2017-07-24 RX ORDER — ATORVASTATIN CALCIUM 40 MG/1
40 TABLET, FILM COATED ORAL
Status: DISCONTINUED | OUTPATIENT
Start: 2017-07-24 | End: 2017-08-07 | Stop reason: HOSPADM

## 2017-07-24 RX ORDER — DILTIAZEM HYDROCHLORIDE 30 MG/1
30 TABLET, FILM COATED ORAL 4 TIMES DAILY
Status: DISCONTINUED | OUTPATIENT
Start: 2017-07-24 | End: 2017-08-07 | Stop reason: HOSPADM

## 2017-07-24 RX ORDER — ENOXAPARIN SODIUM 100 MG/ML
40 INJECTION SUBCUTANEOUS EVERY 24 HOURS
Status: DISCONTINUED | OUTPATIENT
Start: 2017-07-24 | End: 2017-08-07 | Stop reason: HOSPADM

## 2017-07-24 RX ORDER — MELATONIN
1000 DAILY
Status: DISCONTINUED | OUTPATIENT
Start: 2017-07-25 | End: 2017-08-07 | Stop reason: HOSPADM

## 2017-07-24 RX ORDER — DULOXETIN HYDROCHLORIDE 60 MG/1
60 CAPSULE, DELAYED RELEASE ORAL
Status: ON HOLD | COMMUNITY
Start: 2017-06-02 | End: 2017-07-27 | Stop reason: CLARIF

## 2017-07-24 RX ORDER — OXYCODONE AND ACETAMINOPHEN 5; 325 MG/1; MG/1
1-2 TABLET ORAL
Status: DISCONTINUED | OUTPATIENT
Start: 2017-07-24 | End: 2017-07-30

## 2017-07-24 RX ORDER — DILTIAZEM HYDROCHLORIDE 30 MG/1
30 TABLET, FILM COATED ORAL
Status: ON HOLD | COMMUNITY
End: 2017-07-27 | Stop reason: CLARIF

## 2017-07-24 RX ORDER — GUAIFENESIN 100 MG/5ML
81 LIQUID (ML) ORAL
Status: ON HOLD | COMMUNITY
Start: 2017-06-02 | End: 2017-07-27 | Stop reason: CLARIF

## 2017-07-24 RX ORDER — ACETAMINOPHEN 325 MG/1
650 TABLET ORAL
Status: DISCONTINUED | OUTPATIENT
Start: 2017-07-24 | End: 2017-08-07 | Stop reason: HOSPADM

## 2017-07-24 RX ORDER — HYDROMORPHONE HYDROCHLORIDE 1 MG/ML
1 INJECTION, SOLUTION INTRAMUSCULAR; INTRAVENOUS; SUBCUTANEOUS
Status: DISCONTINUED | OUTPATIENT
Start: 2017-07-24 | End: 2017-08-07 | Stop reason: HOSPADM

## 2017-07-24 RX ORDER — LANOLIN ALCOHOL/MO/W.PET/CERES
1000 CREAM (GRAM) TOPICAL
Status: ON HOLD | COMMUNITY
Start: 2016-10-06 | End: 2017-07-27 | Stop reason: CLARIF

## 2017-07-24 RX ORDER — GABAPENTIN 100 MG/1
100 CAPSULE ORAL
Status: ON HOLD | COMMUNITY
Start: 2017-06-19 | End: 2017-07-27 | Stop reason: CLARIF

## 2017-07-24 RX ORDER — GUAIFENESIN 100 MG/5ML
81 LIQUID (ML) ORAL DAILY
Status: DISCONTINUED | OUTPATIENT
Start: 2017-07-25 | End: 2017-08-07 | Stop reason: HOSPADM

## 2017-07-24 RX ORDER — AMIODARONE HYDROCHLORIDE 200 MG/1
200 TABLET ORAL
Status: ON HOLD | COMMUNITY
Start: 2017-07-13 | End: 2017-07-27 | Stop reason: CLARIF

## 2017-07-24 RX ORDER — ONDANSETRON 2 MG/ML
4 INJECTION INTRAMUSCULAR; INTRAVENOUS
Status: DISCONTINUED | OUTPATIENT
Start: 2017-07-24 | End: 2017-08-07 | Stop reason: HOSPADM

## 2017-07-24 RX ORDER — LOSARTAN POTASSIUM 25 MG/1
25 TABLET ORAL DAILY
Status: DISCONTINUED | OUTPATIENT
Start: 2017-07-25 | End: 2017-08-07 | Stop reason: HOSPADM

## 2017-07-24 RX ORDER — GABAPENTIN 100 MG/1
100 CAPSULE ORAL 3 TIMES DAILY
Status: DISCONTINUED | OUTPATIENT
Start: 2017-07-24 | End: 2017-07-30

## 2017-07-24 RX ORDER — SODIUM CHLORIDE 0.9 % (FLUSH) 0.9 %
5-10 SYRINGE (ML) INJECTION AS NEEDED
Status: DISCONTINUED | OUTPATIENT
Start: 2017-07-24 | End: 2017-07-28 | Stop reason: SDUPTHER

## 2017-07-24 RX ORDER — SODIUM CHLORIDE 0.9 % (FLUSH) 0.9 %
5-10 SYRINGE (ML) INJECTION EVERY 8 HOURS
Status: DISCONTINUED | OUTPATIENT
Start: 2017-07-24 | End: 2017-07-28 | Stop reason: SDUPTHER

## 2017-07-24 RX ORDER — MELATONIN
1000
Status: ON HOLD | COMMUNITY
Start: 2017-06-02 | End: 2017-07-27 | Stop reason: CLARIF

## 2017-07-24 RX ORDER — LANOLIN ALCOHOL/MO/W.PET/CERES
1000 CREAM (GRAM) TOPICAL DAILY
Status: DISCONTINUED | OUTPATIENT
Start: 2017-07-25 | End: 2017-08-07 | Stop reason: HOSPADM

## 2017-07-24 RX ORDER — DIPHENHYDRAMINE HYDROCHLORIDE 50 MG/ML
12.5 INJECTION, SOLUTION INTRAMUSCULAR; INTRAVENOUS
Status: DISCONTINUED | OUTPATIENT
Start: 2017-07-24 | End: 2017-08-07 | Stop reason: HOSPADM

## 2017-07-24 RX ORDER — LANOLIN ALCOHOL/MO/W.PET/CERES
1 CREAM (GRAM) TOPICAL
Status: DISCONTINUED | OUTPATIENT
Start: 2017-07-25 | End: 2017-08-07 | Stop reason: HOSPADM

## 2017-07-24 RX ORDER — DOCUSATE SODIUM 100 MG/1
100 CAPSULE, LIQUID FILLED ORAL DAILY
Status: DISCONTINUED | OUTPATIENT
Start: 2017-07-25 | End: 2017-08-07 | Stop reason: HOSPADM

## 2017-07-24 RX ADMIN — GABAPENTIN 100 MG: 100 CAPSULE ORAL at 21:48

## 2017-07-24 RX ADMIN — DILTIAZEM HYDROCHLORIDE 30 MG: 30 TABLET, FILM COATED ORAL at 18:54

## 2017-07-24 RX ADMIN — ATORVASTATIN CALCIUM 40 MG: 40 TABLET, FILM COATED ORAL at 21:48

## 2017-07-24 RX ADMIN — OXYCODONE AND ACETAMINOPHEN 2 TABLET: 5; 325 TABLET ORAL at 16:53

## 2017-07-24 RX ADMIN — DILTIAZEM HYDROCHLORIDE 30 MG: 30 TABLET, FILM COATED ORAL at 21:49

## 2017-07-24 RX ADMIN — Medication 10 ML: at 16:56

## 2017-07-24 RX ADMIN — OXYCODONE AND ACETAMINOPHEN 2 TABLET: 5; 325 TABLET ORAL at 21:49

## 2017-07-24 RX ADMIN — METOPROLOL TARTRATE 12.5 MG: 25 TABLET ORAL at 21:48

## 2017-07-24 RX ADMIN — GABAPENTIN 100 MG: 100 CAPSULE ORAL at 17:01

## 2017-07-24 RX ADMIN — ENOXAPARIN SODIUM 40 MG: 40 INJECTION SUBCUTANEOUS at 16:56

## 2017-07-24 RX ADMIN — PIPERACILLIN SODIUM,TAZOBACTAM SODIUM 3.38 G: 3; .375 INJECTION, POWDER, FOR SOLUTION INTRAVENOUS at 18:54

## 2017-07-24 NOTE — CONSULTS
[facility]  Pulmonary, Critical Care, and Sleep Medicine      Name: Iesha Arenas MRN: [id number]   : 1960 Hospital: [address] [place] [address]   Date: 2017          Critical Care Initial Patient Consult    Requesting MD:   Dr. 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 · Hx of CAD w/hx of ICMO   · Depression       RECOMMENDATIONS:   · Resp -  Wean oxygen to maintain oxygen saturation > 93%. Monitor for fluid overload. · ID - Continue ABX Zosyn per vascular. Pt may be taken to surgery to replace infected left groin graft. Vascular following. ID Consulted. · CVS - HD stable off pressors. Continue amiodarone, aspirin, statin, cardizem, lopressor, and cozaar. · Heme/Onc- H/H-trending down, platelets normal. Daily CBC. · Metabolic - Monitor and replace lytes as needed per protocol. · Renal - Monitor Cr and UOP. · Endocrine - No acute issues. · Neuro/ Pain/ Sedation - PRN pain meds. · GI - Cardiac Diet. · Prophylaxis - DVT(lovenox), GI     Subjective/History: This patient has been seen and evaluated at the request of Dr. Fredrick Badillo for critical care evaluation after being transferred from [facility] where pt was diagnosed with septic shock with pressor requirement. Patient is a 59 y.o. male with pmhx CAD, CHF, PVD, AF, DDD who was transferred to the [facility] ICU from [facility] ICU on 17 for evaluation by Dr. Fredrick Badillo for an infected graft in his right leg. Pt presented to [facility] on Saturday morning with shortness of breath and was admitted to the ICU for septic shock and ARF secondary to CHF excerbation. Pt required bipap, but was diuresed with lasix and is now on NC.  He was on pressors for two days, but has since been weaned off. Pt has exposed graft in the left groin with purulent drainage with previous wound vac on left groin area. Pt is sitting up comfortably in bed with no issues at this time. He admits to STEWART, easy bleeding, and a dry cough, but denies any pain. (+) chronic paresthesias in both feet bilaterally. Pt admits to drinking 2-3 beers daily, but denies IV drug use or tobacco use. He denies chest pain, palpitations, peripheral edema, n/v/d, abdominal pain, hematuria, melena, wheezing, sputum production, fevers/chills, anorexia, weight loss, or any other symptoms at this time. Vascular is at the bedside. Past Medical History:   Diagnosis Date    Acute blood loss as cause of postoperative anemia 4/4/2017    Anticoagulated on Coumadin     Aortoiliac occlusive disease (HCC) 1/25/2017    Atherosclerosis of native artery of both lower extremities with intermittent claudication ([address] 1/25/2017    Benign hypertensive heart disease with systolic congestive heart failure, NYHA class 2 ([address] 1/26/2015    Carotid artery disease (HCC)     Chronic atrial fibrillation (HCC)     Chronic systolic heart failure (HCC)     Chronic ulcer of right foot ([address] 4/4/2017    Coronary artery disease involving native coronary artery of native heart 3/15/2017    Successful stenting of Cx (Xience LESLEY) and RCA (Xience LESLEY) to 0% by Dr. Mihir Mccall on 3/15/17.     DDD (degenerative disc disease), lumbar 1/26/2015    Dyslipidemia     Erectile dysfunction 7/5/2016    Euthyroid sick syndrome 4/6/2017    Hereditary peripheral neuropathy 11/15/2016    History of cardioversion 4/18/2017    S/P Synchronized external cardioversion (4/18/2017 - Dr. Gelacio Silva)    Hypertension     Hyperuricemia     Ischemic cardiomyopathy     Peripheral artery disease ([address] 1/25/2017    Spinal stenosis of lumbar region with radiculopathy 5/4/2015    Dr. Knox Class Vitamin D deficiency 4/22/2017    Vitamin D 25-Hydroxy (4/22/2017) = 12.0      Past Surgical History:   Procedure Laterality Date    CARDIAC CATHETERIZATION  3/8/2017         CARDIAC CATHETERIZATION  3/15/2017         CORONARY STENT SINGLE W/PTCA  3/15/2017         HX COLONOSCOPY  07/23/2015    polyps repeat 2020 5 years [facility]    HX FEMORAL BYPASS Right 04/04/2017    S/P Right axillary to bifemoral artery bypass using an 8 mm Propaten graft from the right axilla to the right common femoral artery with a jump femoral-femoral bypass with another 8 mm Propaten external ring bypass; Right common femoral artery, and profunda femoris artery and superficial femoral artery endarterectomy causing an extensive surgery on the right side (4/4/2017 - Dr. Peter Addison)    [address] Right 04/07/2017    S/P Cutdown of femoral-femoral bypass, more on the left groin side; Right lower extremity angiography with first-order catheterization (4/7/2017 - Dr. Janel Mckeon)    HX FEMORAL BYPASS Right 04/10/2017    S/P Right femoral to above-knee popliteal bypass with an 8 mm PTFE graft (4/10/2017 - Dr. Land)      Prior to Admission medications    Medication Sig Start Date End Date Taking? Authorizing Provider   amiodarone (CORDARONE) 200 mg tablet Take 1 Tab by mouth daily. Indications: VENTRICULAR RATE CONTROL IN ATRIAL FIBRILLATION 7/13/17   Maggie Dixon MD   clopidogrel (PLAVIX) 75 mg tab Take 1 Tab by mouth daily. 7/13/17   Becki Vela MD   oxyCODONE-acetaminophen (PERCOCET) 5-325 mg per tablet Take 2 Tabs by mouth every four (4) hours as needed. Max Daily Amount: 12 Tabs. 7/10/17   JUNE Millan   gabapentin (NEURONTIN) 100 mg capsule Take 1 Cap by mouth two (2) times a day. Indications: NEUROPATHIC PAIN 6/19/17   Amanda Handy MD   aspirin 81 mg chewable tablet Take 1 Tab by mouth daily (with breakfast). 6/2/17   Zahira Raman MD   cholecalciferol (VITAMIN D3) 1,000 unit tablet Take 1 Tab by mouth daily.  6/2/17   Carrie ORTIZ Annamarie Wong MD   losartan (COZAAR) 25 mg tablet Take 1 Tab by mouth daily. Indications: Chronic Heart Failure, hypertension 6/2/17   Kaleb García MD   ascorbic acid, vitamin C, (VITAMIN C) 250 mg tablet Take 1 Tab by mouth daily (with breakfast). 6/2/17   Kaleb García MD   DULoxetine (CYMBALTA) 60 mg capsule Take 1 Cap by mouth daily. 6/2/17   Kaleb García MD   zinc sulfate (ZINCATE) 220 (50) mg capsule Take 1 Cap by mouth daily. 6/2/17   Kaleb García MD   ferrous sulfate 325 mg (65 mg iron) tablet Take 1 Tab by mouth daily (with breakfast). 6/2/17   Kaleb García MD   atorvastatin (LIPITOR) 40 mg tablet Take 1 Tab by mouth nightly. Indications: DYSLIPIDEMIA 6/2/17   Kaleb García MD   metoprolol tartrate (LOPRESSOR) 25 mg tablet Take 0.5 Tabs by mouth every twelve (12) hours. Indications: hypertension, VENTRICULAR RATE CONTROL IN ATRIAL FIBRILLATION 6/2/17   Mick Covington NP   dilTIAZem (CARDIZEM) 30 mg tablet Take 1 Tab by mouth Before breakfast, lunch, dinner and at bedtime. Indications: hypertension 6/2/17   Mick Covington NP   cyanocobalamin (VITAMIN B-12) 1,000 mcg tablet Take 1 Tab by mouth daily.  10/6/16   Kaleb García MD     Current Facility-Administered Medications   Medication Dose Route Frequency    atorvastatin (LIPITOR) tablet 40 mg  40 mg Oral QHS     No Known Allergies   Social History   Substance Use Topics    Smoking status: Former Smoker    Smokeless tobacco: Never Used      Comment: quit in 2011    Alcohol use 1.2 oz/week     2 Cans of beer per week      Comment: 10 cans of beer a month      Family History   Problem Relation Age of Onset    Heart Disease Father         Review of Systems:  Constitutional: positive for fatigue, negative for fevers, sweats and anorexia  Eyes: negative for irritation, redness and icterus  Ears, nose, mouth, throat, and face: negative for hearing loss, nasal congestion, sore mouth and sore throat  Respiratory: positive for cough or dyspnea on exertion, negative for sputum, wheezing or stridor  Cardiovascular: negative for chest pain, palpitations, lower extremity edema  Gastrointestinal: negative for dysphagia, nausea, vomiting, diarrhea, constipation and abdominal pain  Genitourinary:negative for frequency, dysuria and urinary incontinence  Integument/breast: positive for rash and skin lesion(s)  Hematologic/lymphatic: positive for easy bruising and bleeding, negative for blood in stool or urine, coughing up blood and epistaxis  Musculoskeletal:negative for myalgias, arthralgias, stiff joints, neck pain and back pain  Neurological: negative for headaches, dizziness, paresthesia and weakness  Behavioral/Psych: positive for depression, negative for anxiety  Endocrine: positive for diabetic symptoms including polyuria, polydipsia and polyphagia    Objective:   Vital Signs:    Visit Vitals    /70    Pulse 73    Temp 98.8 °F (37.1 °C)    Resp 24    Ht 5' 10\" (1.778 m)    Wt 77.4 kg (170 lb 10.2 oz)    SpO2 99%    BMI 24.48 kg/m2       O2 Device: Nasal cannula   O2 Flow Rate (L/min): 4 l/min   Temp (24hrs), Av.8 °F (37.1 °C), Min:98.8 °F (37.1 °C), Max:98.8 °F (37.1 °C)       Intake/Output:   Last shift:       07 -  1900  In: 200 [P.O.:200]  Out: 300 [Urine:300]  Last 3 shifts:      Intake/Output Summary (Last 24 hours) at 17 1639  Last data filed at 17 1337   Gross per 24 hour   Intake              200 ml   Output              300 ml   Net             -100 ml       Physical Exam:    General:  Alert, cooperative, no distress, appears stated age. Head:  Normocephalic, without obvious abnormality, atraumatic. Eyes:  Conjunctivae/corneas clear. PERRL, EOMs intact. Nose: Nares normal. Septum midline. Mucosa normal. No drainage or sinus tenderness. Throat: Lips, mucosa, and tongue normal. No teeth. Neck: Supple, symmetrical, trachea midline.     Lungs: sClear to auscultation bilaterally. Chest wall:  No tenderness or deformity. Heart:  Regular rate and rhythm, S1, S2 normal, no murmur, click, rub or gallop. Abdomen:   Soft, non-tender. Bowel sounds normal. No masses,  No organomegaly. Extremities: Extremities normal, atraumatic, no cyanosis or edema. Pulses: 2+ and symmetric all extremities. Skin: Left heel ulcer, wound on left abdomen with bandage on left lateral thigh. Right abdomen with wounds from heparin injections. palpable graft of right thorax. Lymph nodes: Cervical, supraclavicular, and axillary nodes normal.   Neurologic: Grossly nonfocal, decreased sensation with paresthesias in LE bilaterally. Data:   No results found for this or any previous visit (from the past 24 hour(s)). Telemetry:normal sinus rhythm    Imaging:  I have personally reviewed the patients radiographs and have reviewed the reports:  CXR 07/24/17: The cardiomediastinal contours are unchanged.  Bilateral interstitial edema and/or interstitial infiltrates persist.  There are no new areas of parenchymal consolidation or collapse.  There is no pneumothorax or pleural effusion.     Asif Matthew PA-C

## 2017-07-24 NOTE — ROUTINE PROCESS
Bedside and Verbal shift change report given to Nurse Tosha Georges RN (oncoming nurse) by Rosa Elena Andrade RN   (offgoing nurse). Report included the following information SBAR, Kardex, ED Summary, Procedure Summary, Intake/Output, MAR and Recent Results.

## 2017-07-24 NOTE — PROGRESS NOTES
Patient direct admit from [facility] via medical transport, no distress, alert and oriented, on comfortable state, NC at 3 lpm, increased to 4 lpm due increased work up breathing after activity, left groin and left left dressing intact, right great toe black with outer heel skin open callous wound, bar pulses and sensation noted,   right lower abdomen with dressing ? Post heparin site per patient, voids in the urinal, PIV sites X 2 flushed/patent. Connected to heart monitor and settled in ICU bed comfortably. 1400 paged Dr. Andrés Regalado awaits to return call back to ICU   4150 Navneet Fuller in, check patient left groin site graft exposed with ? Pus, creamy drainage noted, will placed NPO post midnight, to OR jose? Vel Chandler

## 2017-07-24 NOTE — IP AVS SNAPSHOT
Jv He 
 
 
 [address] Patient: Veryoly Fix MRN: [id number] NRN:8/20/1948 You are allergic to the following Allergen Reactions Morphine Other (comments) Patient gets confused with morphine. Recent Documentation Height Weight BMI Smoking Status 1.778 m 74.9 kg 23.69 kg/m2 Former Smoker Unresulted Labs Order Current Status CULTURE, BLOOD Preliminary result CULTURE, BLOOD Preliminary result Emergency Contacts Name Discharge Info Relation Home Work Mobile GordoLynnette DISCHARGE CAREGIVER [3] Daughter [21] [phone] About your hospitalization You were admitted on:  July 24, 2017 You last received care in the:  [facility] [address] You were discharged on:  August 7, 2017 Unit phone number:  [phone] Why you were hospitalized Your primary diagnosis was: Infection Of Vascular Bypass Graft (Hcc) Your diagnoses also included:  Sepsis (Hcc), Acute Blood Loss As Cause Of Postoperative Anemia, Aftercare Following Surgery Of The Circulatory System, Anticoagulated On Coumadin, Benign Hypertensive Heart Disease With Systolic Congestive Heart Failure, Nyha Class 2 (Hcc), Chronic Atrial Fibrillation (Hcc), Impaired Mobility And Adls Providers Seen During Your Hospitalizations Provider Role Specialty Primary office phone Stewart Silva MD Attending Provider Vascular Surgery [phone] Your Primary Care Physician (PCP) Primary Care Physician Office Phone Office Fax [facility] [phone] Follow-up Information Follow up With Details Comments Contact Info Alejandro Aguirre MD  Patient going to rehab.  [address] [place] [address] [address] [place] [address] 
[phone] Current Discharge Medication List  
  
START taking these medications Dose & Instructions Dispensing Information Comments Morning Noon Evening Bedtime ceFAZolin in dextrose (iso-os) 2 gram/50 mL Pgbk Your last dose was: Your next dose is:    
   
   
 Dose:  2 g  
50 mL by IntraVENous route every eight (8) hours. Stop 9/13/17. Quantity:  50 mL Refills:  0  
     
   
   
   
  
 docusate sodium 100 mg capsule Commonly known as:  [place] Petrin Your last dose was: Your next dose is:    
   
   
 Dose:  100 mg Take 1 Cap by mouth daily for 90 days. Quantity:  30 Cap Refills:  0  
     
   
   
   
  
 furosemide 20 mg tablet Commonly known as:  LASIX Your last dose was: Your next dose is:    
   
   
 1 tablet daily Quantity:  30 Tab Refills:  0  
     
   
   
   
  
 oxyCODONE-acetaminophen  mg per tablet Commonly known as:  PERCOCET 10 Replaces:  oxyCODONE-acetaminophen 5-325 mg per tablet Your last dose was: Your next dose is:    
   
   
 Dose:  1-2 Tab Take 1-2 Tabs by mouth every four (4) hours as needed. Max Daily Amount: 12 Tabs. Quantity:  80 Tab Refills:  0  
     
   
   
   
  
 polyethylene glycol 17 gram packet Commonly known as:  Renu Royalty Your last dose was: Your next dose is:    
   
   
 Dose:  17 g Take 1 Packet by mouth daily. Quantity:  30 Packet Refills:  0  
     
   
   
   
  
 potassium chloride 20 mEq packet Commonly known as:  KLOR-CON Your last dose was: Your next dose is:    
   
   
 Dose:  20 mEq Take 1 Packet by mouth two (2) times daily (with meals). Quantity:  60 Packet Refills:  0  
     
   
   
   
  
 warfarin 5 mg tablet Commonly known as:  COUMADIN Your last dose was: Your next dose is:    
   
   
 Dose:  5 mg Take 1 Tab by mouth once for 1 dose. Quantity:  30 Tab Refills:  0 CONTINUE these medications which have CHANGED Dose & Instructions Dispensing Information Comments Morning Noon Evening Bedtime  
 atorvastatin 40 mg tablet Commonly known as:  LIPITOR What changed:  Another medication with the same name was removed. Continue taking this medication, and follow the directions you see here. Your last dose was: Your next dose is:    
   
   
 Dose:  40 mg Take 1 Tab by mouth nightly. Indications: DYSLIPIDEMIA Quantity:  90 Tab Refills:  3 CONTINUE these medications which have NOT CHANGED Dose & Instructions Dispensing Information Comments Morning Noon Evening Bedtime  
 amiodarone 200 mg tablet Commonly known as:  CORDARONE Your last dose was: Your next dose is:    
   
   
 Dose:  200 mg Take 1 Tab by mouth daily. Indications: VENTRICULAR RATE CONTROL IN ATRIAL FIBRILLATION Quantity:  60 Tab Refills:  0  
     
   
   
   
  
 ascorbic acid (vitamin C) 250 mg tablet Commonly known as:  VITAMIN C Your last dose was: Your next dose is:    
   
   
 Dose:  250 mg Take 1 Tab by mouth daily (with breakfast). Quantity:  90 Tab Refills:  2  
     
   
   
   
  
 aspirin 81 mg chewable tablet Your last dose was: Your next dose is:    
   
   
 Dose:  81 mg Take 1 Tab by mouth daily (with breakfast). Quantity:  90 Tab Refills:  3 cholecalciferol 1,000 unit tablet Commonly known as:  VITAMIN D3 Your last dose was: Your next dose is:    
   
   
 Dose:  1000 Units Take 1 Tab by mouth daily. Quantity:  90 Tab Refills:  3  
     
   
   
   
  
 cyanocobalamin 1,000 mcg tablet Commonly known as:  VITAMIN B-12 Your last dose was: Your next dose is:    
   
   
 Dose:  1000 mcg Take 1 Tab by mouth daily. Quantity:  90 Tab Refills:  3  
     
   
   
   
  
 dilTIAZem 30 mg tablet Commonly known as:  CARDIZEM Your last dose was: Your next dose is:    
   
   
 Dose:  30 mg Take 1 Tab by mouth Before breakfast, lunch, dinner and at bedtime. Indications: hypertension Quantity:  120 Tab Refills:  6 DULoxetine 60 mg capsule Commonly known as:  CYMBALTA Your last dose was: Your next dose is:    
   
   
 Dose:  60 mg Take 1 Cap by mouth daily. Quantity:  90 Cap Refills:  2  
     
   
   
   
  
 ferrous sulfate 325 mg (65 mg iron) tablet Your last dose was: Your next dose is:    
   
   
 Dose:  325 mg Take 1 Tab by mouth daily (with breakfast). Quantity:  90 Tab Refills:  3  
     
   
   
   
  
 gabapentin 100 mg capsule Commonly known as:  NEURONTIN Your last dose was: Your next dose is:    
   
   
 Dose:  100 mg Take 1 Cap by mouth two (2) times a day. Indications: NEUROPATHIC PAIN Quantity:  60 Cap Refills:  9  
     
   
   
   
  
 losartan 25 mg tablet Commonly known as:  COZAAR Your last dose was: Your next dose is:    
   
   
 Dose:  25 mg Take 1 Tab by mouth daily. Indications: Chronic Heart Failure, hypertension Quantity:  90 Tab Refills:  2  
     
   
   
   
  
 metoprolol tartrate 25 mg tablet Commonly known as:  LOPRESSOR Your last dose was: Your next dose is:    
   
   
 Dose:  12.5 mg Take 0.5 Tabs by mouth every twelve (12) hours. Indications: hypertension, VENTRICULAR RATE CONTROL IN ATRIAL FIBRILLATION Quantity:  30 Tab Refills:  6  
     
   
   
   
  
 zinc sulfate 220 (50) mg capsule Commonly known as:  ZINCATE Your last dose was: Your next dose is:    
   
   
 Dose:  220 mg Take 1 Cap by mouth daily. Quantity:  90 Cap Refills:  0 STOP taking these medications   
 clopidogrel 75 mg Tab Commonly known as:  PLAVIX  
   
  
 oxyCODONE-acetaminophen 5-325 mg per tablet Commonly known as:  PERCOCET  
 Replaced by:  oxyCODONE-acetaminophen  mg per tablet Where to Get Your Medications Information on where to get these meds will be given to you by the nurse or doctor. ! Ask your nurse or doctor about these medications ceFAZolin in dextrose (iso-os) 2 gram/50 mL Pgbk  
 docusate sodium 100 mg capsule  
 furosemide 20 mg tablet  
 oxyCODONE-acetaminophen  mg per tablet  
 polyethylene glycol 17 gram packet  
 potassium chloride 20 mEq packet  
 warfarin 5 mg tablet Discharge Instructions DISCHARGE SUMMARY from Nurse The following personal items are in your possession at time of discharge: 
 
Dental Appliances: None Visual Aid: None Home Medications: Kept at bedside Jewelry: None Clothing: None Other Valuables: Cell Phone Personal Items Sent to Safe: no PATIENT INSTRUCTIONS: 
 
After general anesthesia or intravenous sedation, for 24 hours or while taking prescription Narcotics: · Limit your activities · Do not drive and operate hazardous machinery · Do not make important personal or business decisions · Do  not drink alcoholic beverages · If you have not urinated within 8 hours after discharge, please contact your surgeon on call. Report the following to your surgeon: 
· Excessive pain, swelling, redness or odor of or around the surgical area · Temperature over 100.5 · Nausea and vomiting lasting longer than 4 hours or if unable to take medications · Any signs of decreased circulation or nerve impairment to extremity: change in color, persistent  numbness, tingling, coldness or increase pain · Any questions What to do at Home: 
Recommended activity: Activity as tolerated per Rehab instruction If you experience any of the following symptoms severe pain, swelling, redness, temp >100.5 and chills, please follow up with Dr. Chrissie Cranker.  
 
 
*  Please give a list of your current medications to your Primary Care Provider. *  Please update this list whenever your medications are discontinued, doses are 
    changed, or new medications (including over-the-counter products) are added. *  Please carry medication information at all times in case of emergency situations. These are general instructions for a healthy lifestyle: No smoking/ No tobacco products/ Avoid exposure to second hand smoke Surgeon General's Warning:  Quitting smoking now greatly reduces serious risk to your health. Obesity, smoking, and sedentary lifestyle greatly increases your risk for illness A healthy diet, regular physical exercise & weight monitoring are important for maintaining a healthy lifestyle You may be retaining fluid if you have a history of heart failure or if you experience any of the following symptoms:  Weight gain of 3 pounds or more overnight or 5 pounds in a week, increased swelling in our hands or feet or shortness of breath while lying flat in bed. Please call your doctor as soon as you notice any of these symptoms; do not wait until your next office visit. Recognize signs and symptoms of STROKE: 
 
F-face looks uneven A-arms unable to move or move unevenly S-speech slurred or non-existent T-time-call 911 as soon as signs and symptoms begin-DO NOT go Back to bed or wait to see if you get better-TIME IS BRAIN. Warning Signs of HEART ATTACK Call 911 if you have these symptoms: 
? Chest discomfort. Most heart attacks involve discomfort in the center of the chest that lasts more than a few minutes, or that goes away and comes back. It can feel like uncomfortable pressure, squeezing, fullness, or pain. ? Discomfort in other areas of the upper body. Symptoms can include pain or discomfort in one or both arms, the back, neck, jaw, or stomach. ? Shortness of breath with or without chest discomfort. ? Other signs may include breaking out in a cold sweat, nausea, or lightheadedness. Don't wait more than five minutes to call [address]! Fast action can save your life. Calling 911 is almost always the fastest way to get lifesaving treatment. Emergency Medical Services staff can begin treatment when they arrive  up to an hour sooner than if someone gets to the hospital by car. The discharge information has been reviewed with the patient. The patient verbalized understanding. Discharge medications reviewed with the patient and appropriate educational materials and side effects teaching were provided. Patient armband removed and shredded. [facility] Activation Thank you for requesting access to [facility]. Please follow the instructions below to securely access and download your online medical record. [facility] [facility] allows you to send messages to your doctor, view your test results, renew your prescriptions, schedule appointments, and more. How Do I Sign Up? 1. In your internet browser, go to [email or web address] 
2. Click on the First Time User? Click Here link in the Sign In box. You will be redirect to the New Member Sign Up page. 3. Enter your [facility] [facility] Access Code exactly as it appears below. You will not need to use this code after youve completed the sign-up process. If you do not sign up before the expiration date, you must request a new code. [facility] [facility] Access Code: Activation code not generated Current [facility] Status: Patient Declined (This is the date your [facility] [facility] access code will ) 4. Enter the last four digits of your Social Security Number (xxxx) and Date of Birth (mm/dd/yyyy) as indicated and click Submit. You will be taken to the next sign-up page. 5. Create a [facility] ID. This will be your [facility] [facility] login ID and cannot be changed, so think of one that is secure and easy to remember. 6. Create a [facility] [facility] password. You can change your password at any time. 7. Enter your Password Reset Question and Answer.  This can be used at a later time if you forget your password. 8. Enter your e-mail address. You will receive e-mail notification when new information is available in [address] 9. Click Sign Up. You can now view and download portions of your medical record. 10. Click the Download Summary menu link to download a portable copy of your medical information. Additional Information If you have questions, please visit the Frequently Asked Questions section of the [facility] website at [email or web address] Remember, [facility] is NOT to be used for urgent needs. For medical emergencies, dial 911. Discharge Orders None AppointmentCityhart Announcement We are excited to announce that we are making your provider's discharge notes available to you in [facility]. You will see these notes when they are completed and signed by the physician that discharged you from your recent hospital stay. If you have any questions or concerns about any information you see in [facility], please call the Health Information Department where you were seen or reach out to your Primary Care Provider for more information about your plan of care. General Information Please provide this summary of care documentation to your next provider. Patient Signature:  ____________________________________________________________ Date:  ____________________________________________________________  
  
Claudene Born Provider Signature:  ____________________________________________________________ Date:  ____________________________________________________________

## 2017-07-25 ENCOUNTER — ANESTHESIA EVENT (OUTPATIENT)
Dept: CARDIOTHORACIC SURGERY | Age: 57
DRG: 252 | End: 2017-07-25
Payer: COMMERCIAL

## 2017-07-25 ENCOUNTER — ANESTHESIA (OUTPATIENT)
Dept: CARDIOTHORACIC SURGERY | Age: 57
DRG: 252 | End: 2017-07-25
Payer: COMMERCIAL

## 2017-07-25 LAB
ALBUMIN SERPL BCP-MCNC: 2.2 G/DL (ref 3.4–5)
ALBUMIN/GLOB SERPL: 0.5 {RATIO} (ref 0.8–1.7)
ALP SERPL-CCNC: 674 U/L (ref 45–117)
ALT SERPL-CCNC: 123 U/L (ref 16–61)
ANION GAP BLD CALC-SCNC: 9 MMOL/L (ref 3–18)
AST SERPL W P-5'-P-CCNC: 101 U/L (ref 15–37)
BASOPHILS # BLD AUTO: 0 K/UL (ref 0–0.1)
BASOPHILS # BLD: 0 % (ref 0–2)
BILIRUB SERPL-MCNC: 0.7 MG/DL (ref 0.2–1)
BUN SERPL-MCNC: 11 MG/DL (ref 7–18)
BUN/CREAT SERPL: 15 (ref 12–20)
CALCIUM SERPL-MCNC: 8.3 MG/DL (ref 8.5–10.1)
CHLORIDE SERPL-SCNC: 101 MMOL/L (ref 100–108)
CO2 SERPL-SCNC: 22 MMOL/L (ref 21–32)
CREAT SERPL-MCNC: 0.72 MG/DL (ref 0.6–1.3)
DATE LAST DOSE: ABNORMAL
DIFFERENTIAL METHOD BLD: ABNORMAL
EOSINOPHIL # BLD: 0 K/UL (ref 0–0.4)
EOSINOPHIL NFR BLD: 0 % (ref 0–5)
ERYTHROCYTE [DISTWIDTH] IN BLOOD BY AUTOMATED COUNT: 16.1 % (ref 11.6–14.5)
ERYTHROCYTE [DISTWIDTH] IN BLOOD BY AUTOMATED COUNT: 16.2 % (ref 11.6–14.5)
GLOBULIN SER CALC-MCNC: 4.6 G/DL (ref 2–4)
GLUCOSE SERPL-MCNC: 138 MG/DL (ref 74–99)
HCT VFR BLD AUTO: 21 % (ref 36–48)
HCT VFR BLD AUTO: 25.6 % (ref 36–48)
HGB BLD-MCNC: 7.2 G/DL (ref 13–16)
HGB BLD-MCNC: 8.6 G/DL (ref 13–16)
INR PPP: 1 (ref 0.8–1.2)
LYMPHOCYTES # BLD AUTO: 8 % (ref 21–52)
LYMPHOCYTES # BLD: 1.1 K/UL (ref 0.9–3.6)
MCH RBC QN AUTO: 27.7 PG (ref 24–34)
MCH RBC QN AUTO: 28 PG (ref 24–34)
MCHC RBC AUTO-ENTMCNC: 33.6 G/DL (ref 31–37)
MCHC RBC AUTO-ENTMCNC: 34.3 G/DL (ref 31–37)
MCV RBC AUTO: 81.7 FL (ref 74–97)
MCV RBC AUTO: 82.6 FL (ref 74–97)
MONOCYTES # BLD: 0.9 K/UL (ref 0.05–1.2)
MONOCYTES NFR BLD AUTO: 6 % (ref 3–10)
NEUTS SEG # BLD: 11.9 K/UL (ref 1.8–8)
NEUTS SEG NFR BLD AUTO: 86 % (ref 40–73)
PLATELET # BLD AUTO: 325 K/UL (ref 135–420)
PLATELET # BLD AUTO: 337 K/UL (ref 135–420)
PMV BLD AUTO: 9.6 FL (ref 9.2–11.8)
PMV BLD AUTO: 9.8 FL (ref 9.2–11.8)
POTASSIUM SERPL-SCNC: 3.8 MMOL/L (ref 3.5–5.5)
PROT SERPL-MCNC: 6.8 G/DL (ref 6.4–8.2)
PROTHROMBIN TIME: 13.2 SEC (ref 11.5–15.2)
RBC # BLD AUTO: 2.57 M/UL (ref 4.7–5.5)
RBC # BLD AUTO: 3.1 M/UL (ref 4.7–5.5)
REPORTED DOSE,DOSE: ABNORMAL UNITS
REPORTED DOSE/TIME,TMG: 813
SODIUM SERPL-SCNC: 132 MMOL/L (ref 136–145)
VANCOMYCIN TROUGH SERPL-MCNC: 22.1 UG/ML (ref 10–20)
WBC # BLD AUTO: 13.9 K/UL (ref 4.6–13.2)
WBC # BLD AUTO: 15.6 K/UL (ref 4.6–13.2)

## 2017-07-25 PROCEDURE — 04QL0ZZ REPAIR LEFT FEMORAL ARTERY, OPEN APPROACH: ICD-10-PCS | Performed by: SURGERY

## 2017-07-25 PROCEDURE — 65660000000 HC RM CCU STEPDOWN

## 2017-07-25 PROCEDURE — 77030002996 HC SUT SLK J&J -A: Performed by: SURGERY

## 2017-07-25 PROCEDURE — 80202 ASSAY OF VANCOMYCIN: CPT | Performed by: PHYSICIAN ASSISTANT

## 2017-07-25 PROCEDURE — 77030011640 HC PAD GRND REM COVD -A: Performed by: SURGERY

## 2017-07-25 PROCEDURE — 76010000129 HC CV SURG 1.5 TO 2 HR: Performed by: SURGERY

## 2017-07-25 PROCEDURE — 77030031139 HC SUT VCRL2 J&J -A: Performed by: SURGERY

## 2017-07-25 PROCEDURE — 77030019934 HC DRSG VAC ASST KCON -B: Performed by: SURGERY

## 2017-07-25 PROCEDURE — 74011000250 HC RX REV CODE- 250: Performed by: SURGERY

## 2017-07-25 PROCEDURE — 74011250636 HC RX REV CODE- 250/636

## 2017-07-25 PROCEDURE — 77030020782 HC GWN BAIR PAWS FLX 3M -B: Performed by: SURGERY

## 2017-07-25 PROCEDURE — 85027 COMPLETE CBC AUTOMATED: CPT | Performed by: ANESTHESIOLOGY

## 2017-07-25 PROCEDURE — 04PY0JZ REMOVAL OF SYNTHETIC SUBSTITUTE FROM LOWER ARTERY, OPEN APPROACH: ICD-10-PCS | Performed by: SURGERY

## 2017-07-25 PROCEDURE — 74011000250 HC RX REV CODE- 250

## 2017-07-25 PROCEDURE — 86900 BLOOD TYPING SEROLOGIC ABO: CPT | Performed by: ANESTHESIOLOGY

## 2017-07-25 PROCEDURE — 74011250636 HC RX REV CODE- 250/636: Performed by: PHYSICIAN ASSISTANT

## 2017-07-25 PROCEDURE — 87075 CULTR BACTERIA EXCEPT BLOOD: CPT | Performed by: SURGERY

## 2017-07-25 PROCEDURE — 85610 PROTHROMBIN TIME: CPT | Performed by: PHYSICIAN ASSISTANT

## 2017-07-25 PROCEDURE — 36415 COLL VENOUS BLD VENIPUNCTURE: CPT | Performed by: PHYSICIAN ASSISTANT

## 2017-07-25 PROCEDURE — 77030013079 HC BLNKT BAIR HGGR 3M -A: Performed by: ANESTHESIOLOGY

## 2017-07-25 PROCEDURE — 76060000034 HC ANESTHESIA 1.5 TO 2 HR: Performed by: SURGERY

## 2017-07-25 PROCEDURE — 74011000258 HC RX REV CODE- 258

## 2017-07-25 PROCEDURE — 85025 COMPLETE CBC W/AUTO DIFF WBC: CPT | Performed by: PHYSICIAN ASSISTANT

## 2017-07-25 PROCEDURE — 77030010514 HC APPL CLP LIG COVD -B: Performed by: SURGERY

## 2017-07-25 PROCEDURE — 87070 CULTURE OTHR SPECIMN AEROBIC: CPT | Performed by: SURGERY

## 2017-07-25 PROCEDURE — 0JCP0ZZ EXTIRPATION OF MATTER FROM LEFT LOWER LEG SUBCUTANEOUS TISSUE AND FASCIA, OPEN APPROACH: ICD-10-PCS | Performed by: SURGERY

## 2017-07-25 PROCEDURE — 74011250637 HC RX REV CODE- 250/637: Performed by: PHYSICIAN ASSISTANT

## 2017-07-25 PROCEDURE — 65270000029 HC RM PRIVATE

## 2017-07-25 PROCEDURE — 87186 SC STD MICRODIL/AGAR DIL: CPT | Performed by: SURGERY

## 2017-07-25 PROCEDURE — 86920 COMPATIBILITY TEST SPIN: CPT | Performed by: ANESTHESIOLOGY

## 2017-07-25 PROCEDURE — 80053 COMPREHEN METABOLIC PANEL: CPT | Performed by: PHYSICIAN ASSISTANT

## 2017-07-25 PROCEDURE — 74011000258 HC RX REV CODE- 258: Performed by: PHYSICIAN ASSISTANT

## 2017-07-25 PROCEDURE — 77030010509 HC AIRWY LMA MSK TELE -A: Performed by: ANESTHESIOLOGY

## 2017-07-25 PROCEDURE — 77030002924 HC SUT GORTX WLGO -B: Performed by: SURGERY

## 2017-07-25 PROCEDURE — 77030019952 HC CANSTR VAC ASST KCON -B: Performed by: SURGERY

## 2017-07-25 PROCEDURE — 77030002986 HC SUT PROL J&J -A: Performed by: SURGERY

## 2017-07-25 PROCEDURE — 87077 CULTURE AEROBIC IDENTIFY: CPT | Performed by: SURGERY

## 2017-07-25 PROCEDURE — 74011250636 HC RX REV CODE- 250/636: Performed by: SURGERY

## 2017-07-25 RX ORDER — SODIUM CHLORIDE 9 MG/ML
250 INJECTION, SOLUTION INTRAVENOUS AS NEEDED
Status: DISCONTINUED | OUTPATIENT
Start: 2017-07-25 | End: 2017-07-28 | Stop reason: SDUPTHER

## 2017-07-25 RX ORDER — BACITRACIN 50000 [IU]/1
INJECTION, POWDER, FOR SOLUTION INTRAMUSCULAR
Status: COMPLETED
Start: 2017-07-25 | End: 2017-07-25

## 2017-07-25 RX ORDER — SODIUM CHLORIDE 9 MG/ML
INJECTION INTRAMUSCULAR; INTRAVENOUS; SUBCUTANEOUS
Status: COMPLETED
Start: 2017-07-25 | End: 2017-07-25

## 2017-07-25 RX ORDER — NALOXONE HYDROCHLORIDE 0.4 MG/ML
0.1 INJECTION, SOLUTION INTRAMUSCULAR; INTRAVENOUS; SUBCUTANEOUS AS NEEDED
Status: DISCONTINUED | OUTPATIENT
Start: 2017-07-25 | End: 2017-08-07 | Stop reason: HOSPADM

## 2017-07-25 RX ORDER — SODIUM CHLORIDE 0.9 % (FLUSH) 0.9 %
5-10 SYRINGE (ML) INJECTION AS NEEDED
Status: DISCONTINUED | OUTPATIENT
Start: 2017-07-25 | End: 2017-08-07 | Stop reason: HOSPADM

## 2017-07-25 RX ORDER — HEPARIN SODIUM 200 [USP'U]/100ML
INJECTION, SOLUTION INTRAVENOUS
Status: COMPLETED
Start: 2017-07-25 | End: 2017-07-31

## 2017-07-25 RX ORDER — MIDAZOLAM HYDROCHLORIDE 1 MG/ML
INJECTION, SOLUTION INTRAMUSCULAR; INTRAVENOUS AS NEEDED
Status: DISCONTINUED | OUTPATIENT
Start: 2017-07-25 | End: 2017-07-25 | Stop reason: HOSPADM

## 2017-07-25 RX ORDER — FENTANYL CITRATE 50 UG/ML
INJECTION, SOLUTION INTRAMUSCULAR; INTRAVENOUS AS NEEDED
Status: DISCONTINUED | OUTPATIENT
Start: 2017-07-25 | End: 2017-07-25 | Stop reason: HOSPADM

## 2017-07-25 RX ORDER — SODIUM CHLORIDE 0.9 % (FLUSH) 0.9 %
5-10 SYRINGE (ML) INJECTION EVERY 8 HOURS
Status: DISCONTINUED | OUTPATIENT
Start: 2017-07-25 | End: 2017-08-07 | Stop reason: HOSPADM

## 2017-07-25 RX ORDER — PROPOFOL 10 MG/ML
INJECTION, EMULSION INTRAVENOUS AS NEEDED
Status: DISCONTINUED | OUTPATIENT
Start: 2017-07-25 | End: 2017-07-25 | Stop reason: HOSPADM

## 2017-07-25 RX ORDER — BACITRACIN 50000 [IU]/1
INJECTION, POWDER, FOR SOLUTION INTRAMUSCULAR AS NEEDED
Status: DISCONTINUED | OUTPATIENT
Start: 2017-07-25 | End: 2017-07-25 | Stop reason: HOSPADM

## 2017-07-25 RX ORDER — SODIUM CHLORIDE, SODIUM LACTATE, POTASSIUM CHLORIDE, CALCIUM CHLORIDE 600; 310; 30; 20 MG/100ML; MG/100ML; MG/100ML; MG/100ML
INJECTION, SOLUTION INTRAVENOUS
Status: DISCONTINUED | OUTPATIENT
Start: 2017-07-25 | End: 2017-07-25 | Stop reason: HOSPADM

## 2017-07-25 RX ORDER — MORPHINE SULFATE 5 MG/ML
INJECTION, SOLUTION INTRAVENOUS
Status: DISCONTINUED | OUTPATIENT
Start: 2017-07-25 | End: 2017-08-01

## 2017-07-25 RX ADMIN — BACITRACIN: 5000 INJECTION, POWDER, FOR SOLUTION INTRAMUSCULAR at 17:00

## 2017-07-25 RX ADMIN — DILTIAZEM HYDROCHLORIDE 30 MG: 30 TABLET, FILM COATED ORAL at 12:43

## 2017-07-25 RX ADMIN — SODIUM CHLORIDE: 9 INJECTION INTRAMUSCULAR; INTRAVENOUS; SUBCUTANEOUS at 17:00

## 2017-07-25 RX ADMIN — SODIUM CHLORIDE 1000 MG: 900 INJECTION, SOLUTION INTRAVENOUS at 00:45

## 2017-07-25 RX ADMIN — VITAMIN D, TAB 1000IU (100/BT) 1000 UNITS: 25 TAB at 08:07

## 2017-07-25 RX ADMIN — Medication: at 17:00

## 2017-07-25 RX ADMIN — FENTANYL CITRATE 50 MCG: 50 INJECTION, SOLUTION INTRAMUSCULAR; INTRAVENOUS at 17:32

## 2017-07-25 RX ADMIN — OXYCODONE AND ACETAMINOPHEN 2 TABLET: 5; 325 TABLET ORAL at 09:59

## 2017-07-25 RX ADMIN — PIPERACILLIN SODIUM,TAZOBACTAM SODIUM 3.38 G: 3; .375 INJECTION, POWDER, FOR SOLUTION INTRAVENOUS at 18:00

## 2017-07-25 RX ADMIN — Medication 10 ML: at 00:46

## 2017-07-25 RX ADMIN — SODIUM CHLORIDE 1000 MG: 900 INJECTION, SOLUTION INTRAVENOUS at 08:13

## 2017-07-25 RX ADMIN — PROPOFOL 100 MG: 10 INJECTION, EMULSION INTRAVENOUS at 17:33

## 2017-07-25 RX ADMIN — SODIUM CHLORIDE 1000 MG: 900 INJECTION, SOLUTION INTRAVENOUS at 15:56

## 2017-07-25 RX ADMIN — GABAPENTIN 100 MG: 100 CAPSULE ORAL at 15:56

## 2017-07-25 RX ADMIN — GABAPENTIN 100 MG: 100 CAPSULE ORAL at 21:00

## 2017-07-25 RX ADMIN — SODIUM CHLORIDE, SODIUM LACTATE, POTASSIUM CHLORIDE, CALCIUM CHLORIDE: 600; 310; 30; 20 INJECTION, SOLUTION INTRAVENOUS at 17:30

## 2017-07-25 RX ADMIN — Medication 10 ML: at 22:00

## 2017-07-25 RX ADMIN — DILTIAZEM HYDROCHLORIDE 30 MG: 30 TABLET, FILM COATED ORAL at 08:06

## 2017-07-25 RX ADMIN — Medication 325 MG: at 08:07

## 2017-07-25 RX ADMIN — MIDAZOLAM HYDROCHLORIDE 2 MG: 1 INJECTION, SOLUTION INTRAMUSCULAR; INTRAVENOUS at 17:29

## 2017-07-25 RX ADMIN — CYANOCOBALAMIN TAB 1000 MCG 1000 MCG: 1000 TAB at 08:07

## 2017-07-25 RX ADMIN — DULOXETINE HYDROCHLORIDE 60 MG: 60 CAPSULE, DELAYED RELEASE ORAL at 08:07

## 2017-07-25 RX ADMIN — HYDROMORPHONE HYDROCHLORIDE 1 MG: 1 INJECTION, SOLUTION INTRAMUSCULAR; INTRAVENOUS; SUBCUTANEOUS at 20:50

## 2017-07-25 RX ADMIN — Medication 10 ML: at 14:00

## 2017-07-25 RX ADMIN — Medication 250 MG: at 08:07

## 2017-07-25 RX ADMIN — MORPHINE SULFATE: 5 INJECTION, SOLUTION INTRAVENOUS at 22:56

## 2017-07-25 RX ADMIN — GABAPENTIN 100 MG: 100 CAPSULE ORAL at 08:07

## 2017-07-25 RX ADMIN — Medication 10 ML: at 05:49

## 2017-07-25 RX ADMIN — PIPERACILLIN SODIUM,TAZOBACTAM SODIUM 3.38 G: 3; .375 INJECTION, POWDER, FOR SOLUTION INTRAVENOUS at 00:46

## 2017-07-25 RX ADMIN — AMIODARONE HYDROCHLORIDE 200 MG: 200 TABLET ORAL at 08:06

## 2017-07-25 RX ADMIN — PIPERACILLIN SODIUM,TAZOBACTAM SODIUM 3.38 G: 3; .375 INJECTION, POWDER, FOR SOLUTION INTRAVENOUS at 05:49

## 2017-07-25 RX ADMIN — OXYCODONE AND ACETAMINOPHEN 2 TABLET: 5; 325 TABLET ORAL at 13:43

## 2017-07-25 RX ADMIN — DIPHENHYDRAMINE HYDROCHLORIDE 12.5 MG: 50 INJECTION, SOLUTION INTRAMUSCULAR; INTRAVENOUS at 21:53

## 2017-07-25 RX ADMIN — PIPERACILLIN SODIUM,TAZOBACTAM SODIUM 3.38 G: 3; .375 INJECTION, POWDER, FOR SOLUTION INTRAVENOUS at 12:43

## 2017-07-25 RX ADMIN — LOSARTAN POTASSIUM 25 MG: 25 TABLET ORAL at 08:06

## 2017-07-25 RX ADMIN — OXYCODONE AND ACETAMINOPHEN 2 TABLET: 5; 325 TABLET ORAL at 21:53

## 2017-07-25 RX ADMIN — ATORVASTATIN CALCIUM 40 MG: 40 TABLET, FILM COATED ORAL at 21:00

## 2017-07-25 RX ADMIN — DOCUSATE SODIUM 100 MG: 100 CAPSULE, LIQUID FILLED ORAL at 08:07

## 2017-07-25 RX ADMIN — FENTANYL CITRATE 50 MCG: 50 INJECTION, SOLUTION INTRAMUSCULAR; INTRAVENOUS at 17:55

## 2017-07-25 RX ADMIN — METOPROLOL TARTRATE 12.5 MG: 25 TABLET ORAL at 08:06

## 2017-07-25 NOTE — INTERVAL H&P NOTE
H&P Update:  Iesha Arenas was seen and examined. History and physical has been reviewed. Significant clinical changes have occurred as noted: In need of removal of infected leg graft. Given options of hospice care, removal of entire axbifem graft +/- bilateral AKA, removal of infected left leg graft +/- AKA. He has elected for removal of infected left leg graft with hopes for ax-popliteal graft at later date and time. He understands that he is at high risk of AKA.     Signed By: Angélica Neal MD     July 25, 2017 4:39 PM

## 2017-07-25 NOTE — ANESTHESIA PREPROCEDURE EVALUATION
Anesthetic History   No history of anesthetic complications            Review of Systems / Medical History  Patient summary reviewed and pertinent labs reviewed    Pulmonary  Within defined limits                 Neuro/Psych              Cardiovascular    Hypertension: well controlled        Dysrhythmias : PVC  CAD and PAD    Exercise tolerance: <4 METS     GI/Hepatic/Renal  Within defined limits              Endo/Other      Hypothyroidism: well controlled  Arthritis     Other Findings   Comments:   Risk Factors for Postoperative nausea/vomiting:       History of postoperative nausea/vomiting? NO       Female? NO       Motion sickness? NO       Intended opioid administration for postoperative analgesia? YES      Smoking Abstinence  Current Smoker? NO  Elective Surgery? NO  Seen preoperatively by anesthesiologist or proxy prior to day of surgery? YES  Pt abstained from smoking 24 hours prior to anesthesia?  N/A           Physical Exam    Airway  Mallampati: III  TM Distance: 4 - 6 cm  Neck ROM: normal range of motion        Cardiovascular  Regular rate and rhythm,  S1 and S2 normal,  no murmur, click, rub, or gallop             Dental    Dentition: Poor dentition     Pulmonary  Breath sounds clear to auscultation               Abdominal  GI exam deferred       Other Findings            Anesthetic Plan    ASA: 4  Anesthesia type: MAC          Induction: Intravenous  Anesthetic plan and risks discussed with: Patient and Family

## 2017-07-25 NOTE — PROGRESS NOTES
attended the interdisciplinary rounds for Jayleen Mclean, who is a 59 y.o.,male. Patients Primary Language is: [place]. According to the patients EMR Jehovah's witness Affiliation is: No preference.      The reason the Patient came to the hospital is:   Patient Active Problem List    Diagnosis Date Noted    Sepsis ([address] 07/24/2017    PAD (peripheral artery disease) ([address] 06/27/2017    Pseudoaneurysm of femoral artery ([address] 06/27/2017    Vitamin D deficiency 04/22/2017    Aftercare following surgery of the circulatory system 04/21/2017    Status post femoral-popliteal bypass surgery 04/21/2017    Anticoagulated on Coumadin     Ischemic cardiomyopathy     Chronic systolic heart failure (HCC)     Carotid artery disease ([address]     Dyslipidemia     Hyperuricemia     Transient alteration of awareness 04/18/2017    Hyperammonemia ([address] 04/18/2017    History of cardioversion 04/18/2017    Chronic atrial fibrillation ([address]     Critical ischemia of lower extremity 04/10/2017    Euthyroid sick syndrome 04/06/2017    Acute blood loss as cause of postoperative anemia 04/04/2017    Impaired mobility and ADLs 04/04/2017    Chronic ulcer of right foot ([address] 04/04/2017    Coronary artery disease involving native coronary artery of native heart 03/15/2017    Aortoiliac occlusive disease ([address] 01/25/2017    Atherosclerosis of native artery of both lower extremities with intermittent claudication ([address] 01/25/2017    Peripheral artery disease ([address] 01/25/2017    Hereditary peripheral neuropathy 11/15/2016    Erectile dysfunction 07/05/2016    Spinal stenosis of lumbar region with radiculopathy 05/04/2015    Benign hypertensive heart disease with systolic congestive heart failure, NYHA class 2 ([address] 01/26/2015    Vitamin B12 deficiency 01/26/2015    DDD (degenerative disc disease), lumbar 01/26/2015      Plan:  Chaplains will continue to follow and will provide pastoral care on an as needed/requested basis.   recommends bedside caregivers page  on duty if patient shows signs of acute spiritual or emotional distress.     [facility]  Board Certified [address] [facility]   [phone]

## 2017-07-25 NOTE — BRIEF OP NOTE
BRIEF OPERATIVE NOTE    Date of Procedure: 7/25/2017   Preoperative Diagnosis: infected graft  Postoperative Diagnosis: infected graft  Procedure(s):  REMOVAL OF LEFT LEG INFECTED GRAFT  Surgeon(s) and Role:     * Barbra Whittaker MD - Primary         Assistant Staff:       Surgical Staff:  Circ-1: James Ramos RN  Circ-2: Aura Ped  Scrub Tech-1: Solitario Lua. Knapp  Surg Asst-1: Catherine Washington  Event Time In   Incision Start 1750   Incision Close 1913     Anesthesia: MAC   Estimated Blood Loss: 700mL  Specimens:   ID Type Source Tests Collected by Time Destination   1 : left leg Wound Incision CULTURE, ANAEROBIC, CULTURE, WOUND W GRAM STAIN Barbra Whittaker MD 7/25/2017 1712 Microbiology      Findings: none   Complications: large amounts of bleeding as graft at SFA and CFA fell off artery when attempting to dissect around it to get control. Able to remove all graft and suture close both arteries.   Implants: * No implants in log *

## 2017-07-25 NOTE — PROGRESS NOTES
NUTRITION    Nursing Referral: [facility]     RECOMMENDATIONS / PLAN:     - Resume diet as medically appropriate. - Add supplements: Ensure Enlive TID, once able to eat. - Continue RD inpatient monitoring and evaluation. NUTRITION INTERVENTIONS & DIAGNOSIS:     [] Meals/Snacks: modified diet, resume   [] Medical food supplementation: recommended    [x] Vitamin/mineral supplementation: ascorbic acid, cholecalciferol, cyanocobalamin, ferrous sulfate    Nutrition Diagnosis: Unintentional weight loss related to inadequate energy intake as evidenced by 20 lb, 11% weight loss x 4 months. ASSESSMENT:     NPO for possible procedure today. Appetite poor for several days PTA. Agreeable to supplements. Average po intake adequate to meet patients estimated nutritional needs:   [] Yes     [x] No   [] Unable to determine at this time    Diet: DIET NPO Except Meds       Food Allergies: NKFA  Current Appetite:   [] Good     [] Fair     [] Poor     [x] Other: NPO  Appetite/meal intake prior to admission:   [] Good     [x] Fair     [x] Poor x 3-4 days PTA, eating well before then    [] Other:  Feeding Limitations:  [] Swallowing difficulty    [x] Chewing difficulty: missing teeth    [] Other:  Current Meal Intake: No data found.     BM: 7/23   Skin Integrity: right ankle vascular wound, leg vascular wound, groin vascular wound  Edema: none   Pertinent Medications: Reviewed: colace, Zofran    Recent Labs      07/25/17   0052  07/24/17   1930   NA  132*  133*   K  3.8  4.0   CL  101  101   CO2  22  22   GLU  138*  156*   BUN  11  11   CREA  0.72  0.82   CA  8.3*  7.9*   ALB  2.2*   --    SGOT  101*   --    ALT  123*   --        Intake/Output Summary (Last 24 hours) at 07/25/17 1106  Last data filed at 07/25/17 0813   Gross per 24 hour   Intake              700 ml   Output             2450 ml   Net            -1750 ml       Anthropometrics:  Ht Readings from Last 1 Encounters:   07/24/17 5' 10\" (1.778 m)     Last 3 Recorded Weights in this Encounter    07/24/17 1318 07/25/17 0033   Weight: 77.4 kg (170 lb 10.2 oz) 77.4 kg (170 lb 10.2 oz)     Body mass index is 24.48 kg/(m^2). Weight History: pt reports weight loss PTA, previous weights: 190 lb in March 2017 and 180 lb in April 2017 (20 lb, 11% weight loss x 4 months)    Weight Metrics 7/25/2017 7/24/2017 7/17/2017 7/12/2017 6/27/2017 6/22/2017 6/19/2017   Weight 170 lb 10.2 oz - 156 lb 156 lb 14.4 oz - 165 lb 160 lb   BMI - 24.48 kg/m2 22.38 kg/m2 - 22.51 kg/m2 23.68 kg/m2 22.96 kg/m2        Admitting Diagnosis: Severe sepsis with septic shock  Sepsis ([facility]  Pertinent PMHx: CAD, HTN, PAD    Education Needs:        [x] None identified  [] Identified - Not appropriate at this time  []  Identified and addressed - refer to education log  Learning Limitations:   [x] None identified  [] Identified    Cultural, Amish & ethnic food preferences:  [x] None identified    [] Identified and addressed     ESTIMATED NUTRITION NEEDS:     Calories: 2363-0727 kcal (MSJx1.2-1.3) based on  [x] Actual BW 77 kg     [] IBW   Protein: 62-92 gm (0.8-1.2 gm/kg) based on  [x] Actual BW      [] IBW   Fluid: 1 mL/kcal     MONITORING & EVALUATION:     Nutrition Goal(s):   1. Po intake of meals will meet >75% of patient estimated nutritional needs within the next 7 days.   Outcome:  [] Met/Ongoing    []  Not Met    [x] New/Initial Goal     Monitoring:   [x] Diet tolerance   [x] Meal intake   [x] Supplement intake   [] GI symptoms/ability to tolerate po diet   [] Respiratory status   [] Plan of care      Previous Recommendations (for follow-up assessments only):     []   Implemented       []   Not Implemented (RD to address)     [] No Recommendation Made     Discharge Planning: cardiac diet  [x] Participated in care planning, discharge planning, & interdisciplinary rounds as appropriate      Michelle Rios, [address], [address] [place] [address]    Pager: [phone]

## 2017-07-25 NOTE — ANESTHESIA POSTPROCEDURE EVALUATION
Post-Anesthesia Evaluation & Assessment    Visit Vitals    BP 94/55    Pulse 61    Temp 36.7 °C (98 °F)    Resp 16    Ht 5' 10\" (1.778 m)    Wt 77.4 kg (170 lb 10.2 oz)    SpO2 91%    BMI 24.48 kg/m2       Nausea/Vomiting: no nausea    Post-operative hydration adequate.     Pain score (VAS): 0    Mental status & Level of consciousness: alert and oriented x 3    Neurological status: moves all extremities, sensation grossly intact    Pulmonary status: airway patent, supplemental oxygen via face mask    Complications related to anesthesia: none    Additional comments:

## 2017-07-25 NOTE — INTERDISCIPLINARY ROUNDS
CRITICAL CARE INTERDISCIPLINARY ROUNDS      Patient Information:    Name:   Susana Webster    Age:   59 y.o.     Admission Date:   7/24/2017    Readmit Risk Assessment Information:   Prior Hospital Admissions Name of the Hospital(s): [address] [place] [address] E: Child(mile), Family member(s), Relationship with Primary Physician Group: Seen at least one time within the past 6 months    Surgery Date:      Day of Stay:    Expected Discharge Date:        Attending Provider:   Joe Feliciano MD    Surgeon:        Consultant:       Primary Care Provider:   Cody Santos MD    Problem List:     Patient Active Problem List   Diagnosis Code    Benign hypertensive heart disease with systolic congestive heart failure, NYHA class 2 (HCC) I11.0, I50.20    Vitamin B12 deficiency E53.8    DDD (degenerative disc disease), lumbar M51.36    Erectile dysfunction N52.9    Spinal stenosis of lumbar region with radiculopathy M48.06, M54.16    Hereditary peripheral neuropathy G60.9    Aortoiliac occlusive disease ([address] I74.09    Atherosclerosis of native artery of both lower extremities with intermittent claudication ([address] I70.213    Peripheral artery disease ([address] I73.9    Coronary artery disease involving native coronary artery of native heart I25.10    Chronic atrial fibrillation (HCC) I48.2    Transient alteration of awareness R40.4    Acute blood loss as cause of postoperative anemia D62    Impaired mobility and ADLs Z74.09    Anticoagulated on Coumadin Z51.81, Z79.01    Ischemic cardiomyopathy I25.5    Chronic systolic heart failure ([address] I50.22    Carotid artery disease ([address] I77.9    Hyperammonemia ([address] E72.20    Dyslipidemia E78.5    Hyperuricemia E79.0    Euthyroid sick syndrome E07.81    Aftercare following surgery of the circulatory system Z48.812    Status post femoral-popliteal bypass surgery Z95.828    History of cardioversion Z98.890    Critical ischemia of lower extremity I99.8    Chronic ulcer of right foot ([facility] L97.519    Vitamin D deficiency E55.9    PAD (peripheral artery disease) ([facility]) I73.9    Pseudoaneurysm of femoral artery ([facility]) I72.4    Sepsis ([facility] A41.9       Principal Problem:  <principal problem not specified>    Procedure:       During rounds the following quality care indicators and evidence based practices were addressed :     DVT Prophylaxis, Pressure Injury Prevention, Pain Management, Sepsis resuscitation and management, Nutritional Status, Critical Care Interventions Airways, Drains and Lines and IHI Bundles: Jiang Bundle Followed           Acute MI/PCI:   Not applicable    Heart Failure:    Not applicable    Cardiac Surgery:  Not applicable    SCIP Measures for other Surgeries:   Not applicable    Pneumonia:    Appropriate Antibiotic Selection (ICU versus Non-ICU)    Stroke:  Patient's Personal Risk Factors for Stroke are:   hypertension, family history, hyperlipidemia or diabetes mellitus    NIH Stroke Score       Transfer Level of Care:  Not Ready for Transfer    The patient will require the following interventions based on the Readmission Risk Assessment:  Pharmacy evaluation and teaching, Care Management involvement for home health follow up for:  mobility and assistance with ADL's and Spiritual Care evaluation      Discharge Management:  Home and Palliative Care    Anticipated Discharge Date:  3      Interdisciplinary team rounds were held  with the following team membersCare Management, Diabetes Treatment Specialist, Nursing, Nutrition, Pastoral Care, Pharmacy, Physician and Clinical Coordinator and the  patient and child(mile). Plan of care discussed. See clinical pathway and/or care plan for interventions and desired outcomes. NPO since midnight OR today for open graft repair or possible amputations.

## 2017-07-25 NOTE — ROUTINE PROCESS
Bedside and Verbal shift change report given to Desire Adame RN (oncoming nurse) by Grover Spaulding RN   (offgoing nurse). Report included the following information SBAR, Kardex, Intake/Output, MAR, Accordion, Recent Results and Med Rec Status.

## 2017-07-25 NOTE — CONSULTS
[facility] - Consult Note    Consultation request by Adonay Bonds MD for advice/opinion related to evaluating Severe sepsis with septic shock; Sepsis (HCC);DX    Date of  Admission: 7/24/2017 12:46 PM   Primary Care Physician:  Tk Saenz MD     The patient was seen, examined, and independently evaluated and I agree with the below assessment and plan by [facility] YULIANA Collado PA-C with the following comments. This patient apparently was quite shortness of breath over the weekend at [facility], but is lying flat and appears quite comfortable currently. Historically, he has an ischemic cardiomyopathy with moderate LV dysfunction with EF in the 35-40% range and will certainly be at moderate severe increase CV risk at the time of surgery which is to include excessive debridement of left femoral graft area and possible left AKA. No need for diuretics currently, but increased chance for cardiac decompensation perioperatively and will follow course closely with you.         Assessment:     Patient Active Problem List   Diagnosis Code    Benign hypertensive heart disease with systolic congestive heart failure, NYHA class 2 ([facility]) I11.0, I50.20    Vitamin B12 deficiency E53.8    DDD (degenerative disc disease), lumbar M51.36    Erectile dysfunction N52.9    Spinal stenosis of lumbar region with radiculopathy M48.06, M54.16    Hereditary peripheral neuropathy G60.9    Aortoiliac occlusive disease ([address] I74.09    Atherosclerosis of native artery of both lower extremities with intermittent claudication ([address] I70.213    Peripheral artery disease ([address] I73.9    Coronary artery disease involving native coronary artery of native heart I25.10    Chronic atrial fibrillation (HCC) I48.2    Transient alteration of awareness R40.4    Acute blood loss as cause of postoperative anemia D62    Impaired mobility and ADLs Z74.09    Anticoagulated on Coumadin Z51.81, Z79.01    Ischemic cardiomyopathy I25.5  Chronic systolic heart failure (HCC) I50.22    Carotid artery disease (HCC) I77.9    Hyperammonemia ([facility]) E72.20    Dyslipidemia E78.5    Hyperuricemia E79.0    Euthyroid sick syndrome E07.81    Aftercare following surgery of the circulatory system Z48.812    Status post femoral-popliteal bypass surgery Z95.828    History of cardioversion Z98.890    Critical ischemia of lower extremity I99.8    Chronic ulcer of right foot ([facility]) L97.519    Vitamin D deficiency E55.9    PAD (peripheral artery disease) (HCC) I73.9    Pseudoaneurysm of femoral artery (HCC) I72.4    Sepsis ([facility] A41.9     -Admitted to [facility] 7/22/17 for acute SOB. Treated for acute CHF/COPD exacerbation, diuresed. -Repeat echo during admission showed normal LV function with R sided RV dilation and decreased RV systolic function.   -Sepsis  -Shock  -Infected L groin graft, on IV abx, plan for OR soon.  -leukocytosis d/t above, improving on abx.   -Hx of multiple vascular bypass surgeries including ax-fem bypass with jump fem-fem bypass on left and right fem-pop bypass. Recent blow out of left femoral anastomosis, had left CFA ligation with jump bypass from right to left fem-fem bypass.  -Pulm HTN    -Chronic afib, on coumadin, s/p external cardioversion April 2017, currently in NSR. -HLD-HTN  -CAD, status post PTCA of right coronary and circumflex lesions  -ICYMO  -PAD and PVD  -Spinal stenosis       Plan:     -Hemodynamics stable. Appears euvolemic. Watch volume status closely. May need to add diuretics if starts showing evidence of overload. -Pt is of higher cardiac risk for surgical procedure given co-morbidities but given acute infected graft would proceed with surgical revision per vascular.   -Hold coumadin sunday-operatively per vascular recs. -Continue on amiodarone, metoprolol, losartan, cardizem, asa, and statin. History of Present Illness: This is a 59 y.o. male admitted for Severe sepsis with septic shock; Sepsis ([facility] DX. Patient complains of:  Acute sob. 58 yo male with MMP and complicated vascular history with multiple bypass procedures with PTFE graft. Patient now presents with exposed left groin graft and severe sepsis. WBCs improved with ABX. Continue broad spectrum ABX, cultures sent. Will consult ID. ECHO from [place] [address] shows right heart failure. Patient with history of [address] [facility]. Vascular planning to take patient to the OR to remove grafts. Pt was diuresed well at [place] [address] over the weekend and is feeling much better in regards to his respiratory status. Cardiac risk factors:  CAD  HTN  HLD    Review of Symptoms:  Except as stated above include:  Constitutional:  Negative for fevers/chills  Respiratory:  Negative for sob  Cardiovascular:  Negative for chest pain  Gastrointestinal: negative  Genitourinary:  Negative for hematuria  Musculoskeletal:  Negative  Neurological:  Negative for dizziness  Dermatological:  Negative  Endocrinological: Negative  Psychological:  Negative     Past Medical History:     Past Medical History:   Diagnosis Date    Acute blood loss as cause of postoperative anemia 4/4/2017    Anticoagulated on Coumadin     Aortoiliac occlusive disease ([address] 1/25/2017    Atherosclerosis of native artery of both lower extremities with intermittent claudication ([address] 1/25/2017    Benign hypertensive heart disease with systolic congestive heart failure, NYHA class 2 ([address] 1/26/2015    Carotid artery disease (HCC)     Chronic atrial fibrillation (HCC)     Chronic systolic heart failure (HCC)     Chronic ulcer of right foot ([address] 4/4/2017    Coronary artery disease involving native coronary artery of native heart 3/15/2017    Successful stenting of Cx (Xience LESLEY) and RCA (Xience LESLEY) to 0% by Dr. Christian Rivas on 3/15/17.     DDD (degenerative disc disease), lumbar 1/26/2015    Dyslipidemia     Erectile dysfunction 7/5/2016    Euthyroid sick syndrome 4/6/2017    Hereditary peripheral neuropathy 11/15/2016    History of cardioversion 4/18/2017    S/P Synchronized external cardioversion (4/18/2017 - Dr. Yanique Broussard)    Hypertension     Hyperuricemia     Ischemic cardiomyopathy     Peripheral artery disease ([address] 1/25/2017    Spinal stenosis of lumbar region with radiculopathy 5/4/2015    Dr. Amelia Renteria Vitamin D deficiency 4/22/2017    Vitamin D 25-Hydroxy (4/22/2017) = 12.0         Social History:     Social History     Social History    Marital status: LEGALLY      Spouse name: N/A    Number of children: N/A    Years of education: N/A     Social History Main Topics    Smoking status: Former Smoker    Smokeless tobacco: Never Used      Comment: quit in 2011    Alcohol use 1.2 oz/week     2 Cans of beer per week      Comment: 10 cans of beer a month    Drug use: Yes     Special: Marijuana      Comment: occasionally    Sexual activity: Yes     Partners: Female     Other Topics Concern    Not on file     Social History Narrative        Family History:     Family History   Problem Relation Age of Onset    Heart Disease Father         Medications:   No Known Allergies     Current Facility-Administered Medications   Medication Dose Route Frequency    Vancomycin Lab Information  1 Each Other Once per day on Tue    atorvastatin (LIPITOR) tablet 40 mg  40 mg Oral QHS    oxyCODONE-acetaminophen (PERCOCET) 5-325 mg per tablet 1-2 Tab  1-2 Tab Oral Q4H PRN    acetaminophen (TYLENOL) tablet 650 mg  650 mg Oral Q4H PRN    sodium chloride (NS) flush 5-10 mL  5-10 mL IntraVENous Q8H    sodium chloride (NS) flush 5-10 mL  5-10 mL IntraVENous PRN    HYDROmorphone (PF) (DILAUDID) injection 1 mg  1 mg IntraVENous Q3H PRN    naloxone (NARCAN) injection 0.4 mg  0.4 mg IntraVENous PRN    diphenhydrAMINE (BENADRYL) injection 12.5 mg  12.5 mg IntraVENous Q4H PRN    ondansetron (ZOFRAN) injection 4 mg  4 mg IntraVENous Q4H PRN    docusate sodium (COLACE) capsule 100 mg  100 mg Oral DAILY    enoxaparin (LOVENOX) injection 40 mg  40 mg SubCUTAneous Q24H    amiodarone (CORDARONE) tablet 200 mg  200 mg Oral DAILY    ascorbic acid (vitamin C) (VITAMIN C) tablet 250 mg  250 mg Oral DAILY    aspirin chewable tablet 81 mg  81 mg Oral DAILY    cholecalciferol (VITAMIN D3) tablet 1,000 Units  1,000 Units Oral DAILY    cyanocobalamin tablet 1,000 mcg  1,000 mcg Oral DAILY    dilTIAZem (CARDIZEM) IR tablet 30 mg  30 mg Oral QID    DULoxetine (CYMBALTA) capsule 60 mg  60 mg Oral DAILY    ferrous sulfate tablet 325 mg  1 Tab Oral DAILY WITH BREAKFAST    gabapentin (NEURONTIN) capsule 100 mg  100 mg Oral TID    metoprolol tartrate (LOPRESSOR) tablet 12.5 mg  12.5 mg Oral Q12H    losartan (COZAAR) tablet 25 mg  25 mg Oral DAILY    piperacillin-tazobactam (ZOSYN) 3.375 g in 0.9% sodium chloride (MBP/ADV) 100 mL MBP  3.375 g IntraVENous Q6H    vancomycin (VANCOCIN) 1,000 mg in 0.9% sodium chloride (MBP/ADV) 250 mL adv  1,000 mg IntraVENous Q8H         Physical Exam:     Visit Vitals    /79    Pulse 63    Temp 97.8 °F (36.6 °C)    Resp 23    Ht 5' 10\" (1.778 m)    Wt 77.4 kg (170 lb 10.2 oz)    SpO2 99%    BMI 24.48 kg/m2     BP Readings from Last 3 Encounters:   07/25/17 105/79   07/20/17 111/55   07/18/17 98/70     Pulse Readings from Last 3 Encounters:   07/25/17 63   07/20/17 (!) 59   07/18/17 62     Wt Readings from Last 3 Encounters:   07/25/17 77.4 kg (170 lb 10.2 oz)   07/17/17 70.8 kg (156 lb)   07/12/17 71.2 kg (156 lb 14.4 oz)       General: awake, alert, oriented x 3  Neck:  Supple, no jvd  Lungs:  Clear to auscultation bilaterally, except for some course rales in the right base.    Heart:  Reg rate and rhythm  Abdomen:  Soft, non-tender  Extremities:  No edema, L groin with c/d/i dressing covering wound  Skin: no rashes or lesions  Neuro: no focal deficits  Psych: normal mood and affect     Data Review:     Recent Labs      07/25/17   0052   WBC  13.9*   HGB  8.6*   HCT 25.6*   PLT  337     Recent Labs      07/25/17   0052  07/24/17   1930   NA  132*  133*   K  3.8  4.0   CL  101  101   CO2  22  22   GLU  138*  156*   BUN  11  11   CREA  0.72  0.82   CA  8.3*  7.9*   ALB  2.2*   --    SGOT  101*   --    ALT  123*   --    INR  1.0   --        Results for orders placed or performed in visit on 05/17/17   AMB POC EKG ROUTINE W/ 12 LEADS, INTER & REP    Narrative    Sinus rhythm.   Nonspecific ST-T changes   Results for orders placed or performed during the hospital encounter of 04/04/17   EKG, 12 LEAD, INITIAL   Result Value Ref Range    Ventricular Rate 130 BPM    Atrial Rate 125 BPM    QRS Duration 88 ms    Q-T Interval 330 ms    QTC Calculation (Bezet) 485 ms    Calculated R Axis 4 degrees    Calculated T Axis -125 degrees    Diagnosis       Atrial fibrillation with rapid ventricular response with premature   ventricular or aberrantly conducted complexes  ST & T wave abnormality, consider inferolateral ischemia or digitalis effect  Abnormal ECG  When compared with ECG of 16-MAR-2017 05:50,  No significant change was found  Confirmed by Gabriel Antoine MD, Bob Oneil ([email or web address]) on 4/5/2017 3:55:24 PM         All Cardiac Markers in the last 24 hours:  No results found for: CPK, CKMMB, CKMB, RCK3, CKMBT, CKNDX, CKND1, LEO, TROPT, TROIQ, DEWAYNE, TROPT, TNIPOC, BNP, BNPP    Last Lipid:    Lab Results   Component Value Date/Time    Cholesterol, total 157 12/29/2015 07:18 AM    HDL Cholesterol 28 12/29/2015 07:18 AM    LDL, calculated 112.4 12/29/2015 07:18 AM    Triglyceride 83 12/29/2015 07:18 AM    CHOL/HDL Ratio 5.6 12/29/2015 07:18 AM       Signed By: Peyman Navarro DO     July 25, 2017

## 2017-07-25 NOTE — PROGRESS NOTES
Progress Note  Pulmonary, Critical Care, and Sleep Medicine    Name: Chastity Varghese MRN: [id number]   : 1960 Hospital: [facility]   Date: 2017        IMPRESSION:   · Infected OTFE Graft likely requiring surgery and possible bilateral AKA   · Complicated Vascular history with multiple bilateral ax-fem and fem-pop bypasses   · Hypertension- controlled   · Chronic Diastolic Heart Failure- appears compensated. · Hx of CAD w/hx of ICMO   · Prior Alcohol Abuse: 3-4 beers daily, quit \"a few months ago\"  · Elevated LFTs- normal 2017(not measured at [facility]), may residual for hypotension/shock liver vs. Alcoholic liver vs. fatty liver   · Depression       PLAN:   · Resp -  On RA. PRN oxygen to maintain oxygen saturation > 93%. Monitor for fluid overload. · ID - Continue ABX Zosyn per vascular. ID Consulted. LA-negative. · CVS - HD stable off pressors. Continue amiodarone, aspirin, statin, cardizem, lopressor, and cozaar. Cardiology following. · Heme/Onc- H/H-trending down, platelets normal. Daily CBC. · Metabolic - Monitor and replace lytes as needed per protocol. · Renal - Monitor Cr and UOP. · Endocrine - No acute issues. · Neuro/ Pain/ Sedation - PRN pain meds. · GI - NPO for surgery, Cardiac Diet. LFTs elevated-trend to ensure they are going down, prior alcohol abuse. May need abdominal U/S as outpatient if problem persists. · Vascular: Pt will be taken to surgery today by Dr. Miley Garcia. · Prophylaxis - DVT(lovenox), GI  · Disposition Post-op per Vascular. Subjective/Interval History:   17  HD stable and afebrile overnight. On RA. Good UOP. Currently NPO for surgery. Pt notes paresthesias on LE bilaterally and mild dry cough, but has no other complaints at this time.     Denies chest pain, SOB, orthopnea, palpitations, peripheral edema, n/v/d, abdominal pain, hematuria, melena, wheezing, sputum production, fevers/chills, anorexia, weight loss, or any other symptoms at this time    ROS:Pertinent items are noted in HPI. Orders reviewed including medications. Changes made if indicated. Telemetry monitor reviewed at the bedside. Objective:   Vital Signs:    Visit Vitals    /80    Pulse 71    Temp 98 °F (36.7 °C)    Resp 17    Ht 5' 10\" (1.778 m)    Wt 77.4 kg (170 lb 10.2 oz)    SpO2 100%    BMI 24.48 kg/m2       O2 Device: Room air   O2 Flow Rate (L/min): 4 l/min   Temp (24hrs), Av °F (36.7 °C), Min:97.6 °F (36.4 °C), Max:98.8 °F (37.1 °C)       Intake/Output:   Last shift:       07 -  1900  In: 500 [I.V.:500]  Out: 550 [Urine:550]  Last 3 shifts: 1901 -  0700  In: 200 [P.O.:200]  Out: 1900 [Urine:1900]    Intake/Output Summary (Last 24 hours) at 17 1157  Last data filed at 17 0813   Gross per 24 hour   Intake              700 ml   Output             2450 ml   Net            -1750 ml        Physical Exam:    General:  Alert, cooperative, no distress, appears stated age. Head:  Normocephalic, without obvious abnormality, atraumatic. Eyes:  Conjunctivae/corneas clear. PERRL, EOMs intact. Nose: Nares normal. Septum midline. Mucosa normal. No drainage or sinus tenderness. Throat: Lips, mucosa, and tongue normal. No teeth. Neck: Supple, symmetrical, trachea midline. Lungs:   sClear to auscultation bilaterally. Chest wall:  No tenderness or deformity. Heart:  Regular rate and rhythm, S1, S2 normal, no murmur, click, rub or gallop. Abdomen:   Soft, non-tender. Bowel sounds normal. No masses,  No organomegaly. Extremities: Extremities normal, atraumatic, no cyanosis or edema. Pulses: 2+ and symmetric all extremities. Skin: Left heel ulcer, wound on left abdomen. Exposed graft on left lateral thigh with blue edges. Right abdomen with wounds/scabs from heparin injections. palpable graft of right thorax.     Lymph nodes: Cervical, supraclavicular, and axillary nodes normal.   Neurologic: Grossly nonfocal, decreased sensation with paresthesias in LE bilaterally. DATA:  Labs:  Recent Labs      07/25/17 0052   WBC  13.9*   HGB  8.6*   HCT  25.6*   PLT  337     Recent Labs      07/25/17 0052 07/24/17   1930   NA  132*  133*   K  3.8  4.0   CL  101  101   CO2  22  22   GLU  138*  156*   BUN  11  11   CREA  0.72  0.82   CA  8.3*  7.9*   ALB  2.2*   --    SGOT  101*   --    ALT  123*   --    INR  1.0   --      No results for input(s): PH, PCO2, PO2, HCO3, FIO2 in the last 72 hours.     Imaging: no new imaging     Paris Ovalle PA-C

## 2017-07-25 NOTE — PROGRESS NOTES
Care Management Interventions  Mode of Transport at Discharge: Other (see comment) (Pt will be transported by family)  Transition of Care Consult (CM Consult): Discharge Planning, [address] (Will need a resumption of services with [address]  )  [address]: Yes  Current Support Network: Other (Pt will be going to stay with his daughter, Dwight Gonzales [phone])  Confirm Follow Up Transport: Family  Plan discussed with Pt/Family/Caregiver: Yes    Patient is a 58 yo male admitted for severe sepsis. The patient originally presented to the ED c/o SOB. He was then found to have sepsis. Patient is a readmit following surgical repair of a ruptured abdominal aortic aneurysm. The patient was discharged with a historic wound wac and 3x/wk wound care. Patient is due for another surgery as the patient's surgery did not hold. There is a possibility of AKA. Patient was staying with his older brother upon discharge from the hospital last time. Upon discharge this hospitalization he will be staying with his daughter, Dwight Gonzales, in [place] at [address] [facility], [address] [place] [address]

## 2017-07-26 LAB
ALBUMIN SERPL BCP-MCNC: 2 G/DL (ref 3.4–5)
ALBUMIN/GLOB SERPL: 0.5 {RATIO} (ref 0.8–1.7)
ALP SERPL-CCNC: 516 U/L (ref 45–117)
ALT SERPL-CCNC: 96 U/L (ref 16–61)
ANION GAP BLD CALC-SCNC: 8 MMOL/L (ref 3–18)
AST SERPL W P-5'-P-CCNC: 70 U/L (ref 15–37)
BASOPHILS # BLD AUTO: 0 K/UL (ref 0–0.06)
BASOPHILS # BLD: 0 % (ref 0–3)
BILIRUB SERPL-MCNC: 0.7 MG/DL (ref 0.2–1)
BUN SERPL-MCNC: 15 MG/DL (ref 7–18)
BUN/CREAT SERPL: 21 (ref 12–20)
CALCIUM SERPL-MCNC: 8 MG/DL (ref 8.5–10.1)
CHLORIDE SERPL-SCNC: 103 MMOL/L (ref 100–108)
CO2 SERPL-SCNC: 23 MMOL/L (ref 21–32)
CREAT SERPL-MCNC: 0.73 MG/DL (ref 0.6–1.3)
DIFFERENTIAL METHOD BLD: ABNORMAL
EOSINOPHIL # BLD: 0 K/UL (ref 0–0.4)
EOSINOPHIL NFR BLD: 0 % (ref 0–5)
ERYTHROCYTE [DISTWIDTH] IN BLOOD BY AUTOMATED COUNT: 15.9 % (ref 11.6–14.5)
GLOBULIN SER CALC-MCNC: 4.1 G/DL (ref 2–4)
GLUCOSE SERPL-MCNC: 95 MG/DL (ref 74–99)
HCT VFR BLD AUTO: 21.6 % (ref 36–48)
HCT VFR BLD AUTO: 26 % (ref 36–48)
HGB BLD-MCNC: 7.4 G/DL (ref 13–16)
HGB BLD-MCNC: 8.8 G/DL (ref 13–16)
LYMPHOCYTES # BLD AUTO: 6 % (ref 20–51)
LYMPHOCYTES # BLD: 1.2 K/UL (ref 0.8–3.5)
MCH RBC QN AUTO: 28.6 PG (ref 24–34)
MCHC RBC AUTO-ENTMCNC: 34.3 G/DL (ref 31–37)
MCV RBC AUTO: 83.4 FL (ref 74–97)
MONOCYTES # BLD: 0.8 K/UL (ref 0–1)
MONOCYTES NFR BLD AUTO: 4 % (ref 2–9)
NEUTS BAND NFR BLD MANUAL: 16 % (ref 0–5)
NEUTS SEG # BLD: 17.5 K/UL (ref 1.8–8)
NEUTS SEG NFR BLD AUTO: 74 % (ref 42–75)
PLATELET # BLD AUTO: 300 K/UL (ref 135–420)
PLATELET COMMENTS,PCOM: ABNORMAL
PMV BLD AUTO: 9.9 FL (ref 9.2–11.8)
POTASSIUM SERPL-SCNC: 3.8 MMOL/L (ref 3.5–5.5)
PROT SERPL-MCNC: 6.1 G/DL (ref 6.4–8.2)
RBC # BLD AUTO: 2.59 M/UL (ref 4.7–5.5)
RBC MORPH BLD: ABNORMAL
SODIUM SERPL-SCNC: 134 MMOL/L (ref 136–145)
VANCOMYCIN SERPL-MCNC: 15.8 UG/ML (ref 5–40)
WBC # BLD AUTO: 19.5 K/UL (ref 4.6–13.2)

## 2017-07-26 PROCEDURE — 74011250637 HC RX REV CODE- 250/637: Performed by: PHYSICIAN ASSISTANT

## 2017-07-26 PROCEDURE — 65660000004 HC RM CVT STEPDOWN

## 2017-07-26 PROCEDURE — P9016 RBC LEUKOCYTES REDUCED: HCPCS | Performed by: ANESTHESIOLOGY

## 2017-07-26 PROCEDURE — 80053 COMPREHEN METABOLIC PANEL: CPT | Performed by: SURGERY

## 2017-07-26 PROCEDURE — 85018 HEMOGLOBIN: CPT | Performed by: SURGERY

## 2017-07-26 PROCEDURE — 85025 COMPLETE CBC W/AUTO DIFF WBC: CPT | Performed by: SURGERY

## 2017-07-26 PROCEDURE — 74011000258 HC RX REV CODE- 258: Performed by: PHYSICIAN ASSISTANT

## 2017-07-26 PROCEDURE — 36415 COLL VENOUS BLD VENIPUNCTURE: CPT | Performed by: SURGERY

## 2017-07-26 PROCEDURE — 74011250636 HC RX REV CODE- 250/636: Performed by: PHYSICIAN ASSISTANT

## 2017-07-26 PROCEDURE — 36430 TRANSFUSION BLD/BLD COMPNT: CPT

## 2017-07-26 PROCEDURE — 80202 ASSAY OF VANCOMYCIN: CPT | Performed by: SURGERY

## 2017-07-26 PROCEDURE — 30233N1 TRANSFUSION OF NONAUTOLOGOUS RED BLOOD CELLS INTO PERIPHERAL VEIN, PERCUTANEOUS APPROACH: ICD-10-PCS | Performed by: SURGERY

## 2017-07-26 PROCEDURE — 74011250636 HC RX REV CODE- 250/636: Performed by: SURGERY

## 2017-07-26 RX ORDER — SODIUM CHLORIDE 9 MG/ML
250 INJECTION, SOLUTION INTRAVENOUS AS NEEDED
Status: DISCONTINUED | OUTPATIENT
Start: 2017-07-26 | End: 2017-08-07 | Stop reason: HOSPADM

## 2017-07-26 RX ADMIN — PIPERACILLIN SODIUM,TAZOBACTAM SODIUM 3.38 G: 3; .375 INJECTION, POWDER, FOR SOLUTION INTRAVENOUS at 05:44

## 2017-07-26 RX ADMIN — Medication 10 ML: at 05:44

## 2017-07-26 RX ADMIN — CYANOCOBALAMIN TAB 1000 MCG 1000 MCG: 1000 TAB at 10:26

## 2017-07-26 RX ADMIN — OXYCODONE AND ACETAMINOPHEN 2 TABLET: 5; 325 TABLET ORAL at 02:41

## 2017-07-26 RX ADMIN — DILTIAZEM HYDROCHLORIDE 30 MG: 30 TABLET, FILM COATED ORAL at 21:30

## 2017-07-26 RX ADMIN — GABAPENTIN 100 MG: 100 CAPSULE ORAL at 10:25

## 2017-07-26 RX ADMIN — AMIODARONE HYDROCHLORIDE 200 MG: 200 TABLET ORAL at 10:25

## 2017-07-26 RX ADMIN — HYDROMORPHONE HYDROCHLORIDE 1 MG: 1 INJECTION, SOLUTION INTRAMUSCULAR; INTRAVENOUS; SUBCUTANEOUS at 00:24

## 2017-07-26 RX ADMIN — GABAPENTIN 100 MG: 100 CAPSULE ORAL at 16:05

## 2017-07-26 RX ADMIN — HYDROMORPHONE HYDROCHLORIDE 1 MG: 1 INJECTION, SOLUTION INTRAMUSCULAR; INTRAVENOUS; SUBCUTANEOUS at 05:44

## 2017-07-26 RX ADMIN — GABAPENTIN 100 MG: 100 CAPSULE ORAL at 21:30

## 2017-07-26 RX ADMIN — Medication 325 MG: at 10:26

## 2017-07-26 RX ADMIN — PIPERACILLIN SODIUM,TAZOBACTAM SODIUM 3.38 G: 3; .375 INJECTION, POWDER, FOR SOLUTION INTRAVENOUS at 17:16

## 2017-07-26 RX ADMIN — PIPERACILLIN SODIUM,TAZOBACTAM SODIUM 3.38 G: 3; .375 INJECTION, POWDER, FOR SOLUTION INTRAVENOUS at 00:23

## 2017-07-26 RX ADMIN — Medication 10 ML: at 21:34

## 2017-07-26 RX ADMIN — METOPROLOL TARTRATE 12.5 MG: 25 TABLET ORAL at 10:25

## 2017-07-26 RX ADMIN — METOPROLOL TARTRATE 12.5 MG: 25 TABLET ORAL at 21:30

## 2017-07-26 RX ADMIN — ATORVASTATIN CALCIUM 40 MG: 40 TABLET, FILM COATED ORAL at 21:30

## 2017-07-26 RX ADMIN — ENOXAPARIN SODIUM 40 MG: 40 INJECTION SUBCUTANEOUS at 16:12

## 2017-07-26 RX ADMIN — Medication 10 ML: at 16:13

## 2017-07-26 RX ADMIN — DULOXETINE HYDROCHLORIDE 60 MG: 60 CAPSULE, DELAYED RELEASE ORAL at 10:25

## 2017-07-26 RX ADMIN — ASPIRIN 81 MG 81 MG: 81 TABLET ORAL at 10:25

## 2017-07-26 RX ADMIN — PIPERACILLIN SODIUM,TAZOBACTAM SODIUM 3.38 G: 3; .375 INJECTION, POWDER, FOR SOLUTION INTRAVENOUS at 12:30

## 2017-07-26 RX ADMIN — VITAMIN D, TAB 1000IU (100/BT) 1000 UNITS: 25 TAB at 10:25

## 2017-07-26 RX ADMIN — Medication 5 ML: at 21:30

## 2017-07-26 RX ADMIN — DOCUSATE SODIUM 100 MG: 100 CAPSULE, LIQUID FILLED ORAL at 10:25

## 2017-07-26 RX ADMIN — DILTIAZEM HYDROCHLORIDE 30 MG: 30 TABLET, FILM COATED ORAL at 10:25

## 2017-07-26 RX ADMIN — LOSARTAN POTASSIUM 25 MG: 25 TABLET ORAL at 10:26

## 2017-07-26 RX ADMIN — VANCOMYCIN HYDROCHLORIDE 750 MG: 10 INJECTION, POWDER, LYOPHILIZED, FOR SOLUTION INTRAVENOUS at 10:43

## 2017-07-26 RX ADMIN — PIPERACILLIN SODIUM,TAZOBACTAM SODIUM 3.38 G: 3; .375 INJECTION, POWDER, FOR SOLUTION INTRAVENOUS at 23:18

## 2017-07-26 RX ADMIN — DILTIAZEM HYDROCHLORIDE 30 MG: 30 TABLET, FILM COATED ORAL at 17:16

## 2017-07-26 RX ADMIN — DILTIAZEM HYDROCHLORIDE 30 MG: 30 TABLET, FILM COATED ORAL at 12:30

## 2017-07-26 RX ADMIN — VANCOMYCIN HYDROCHLORIDE 750 MG: 10 INJECTION, POWDER, LYOPHILIZED, FOR SOLUTION INTRAVENOUS at 21:30

## 2017-07-26 RX ADMIN — OXYCODONE AND ACETAMINOPHEN 1 TABLET: 5; 325 TABLET ORAL at 16:05

## 2017-07-26 RX ADMIN — Medication 250 MG: at 10:24

## 2017-07-26 NOTE — PROGRESS NOTES
TRANSFER - OUT REPORT:    Verbal report given to CVT stepdown RN(name) on Dorothea Allen  being transferred to 2301(unit) for routine progression of care       Report consisted of patients Situation, Background, Assessment and   Recommendations(SBAR). Information from the following report(s) SBAR, Kardex, [facility] and Recent Results was reviewed with the receiving nurse. Lines:   Peripheral IV 07/24/17 Right Wrist (Active)   Site Assessment Clean, dry, & intact 7/25/2017  7:58 PM   Phlebitis Assessment 0 7/25/2017  7:58 PM   Infiltration Assessment 0 7/25/2017  7:58 PM   Dressing Status Clean, dry, & intact 7/25/2017  7:58 PM   Dressing Type Transparent;Tape 7/25/2017  7:58 PM   Hub Color/Line Status Pink;Patent; Flushed 7/25/2017  7:58 PM   Alcohol Cap Used No 7/25/2017  7:58 PM       Peripheral IV 07/24/17 Left Forearm (Active)   Site Assessment Clean, dry, & intact 7/25/2017  7:58 PM   Phlebitis Assessment 0 7/25/2017  7:58 PM   Infiltration Assessment 0 7/25/2017  7:58 PM   Dressing Status Clean, dry, & intact 7/25/2017  7:58 PM   Dressing Type Transparent;Tape 7/25/2017  7:58 PM   Hub Color/Line Status Pink;Patent; Flushed 7/25/2017  7:58 PM   Alcohol Cap Used No 7/25/2017  7:58 PM       Peripheral IV 07/25/17 Right Antecubital (Active)   Site Assessment Clean, dry, & intact 7/25/2017  7:58 PM   Phlebitis Assessment 0 7/25/2017  7:58 PM   Infiltration Assessment 0 7/25/2017  7:58 PM   Dressing Status Clean, dry, & intact 7/25/2017  7:58 PM   Dressing Type Transparent;Tape 7/25/2017  7:58 PM   Hub Color/Line Status Green;Patent; Flushed; Infusing 7/25/2017  7:58 PM   Alcohol Cap Used No 7/25/2017  7:58 PM        Opportunity for questions and clarification was provided.       Patient transported with:   O2 @ 5 liters

## 2017-07-26 NOTE — PROGRESS NOTES
[facility]  -  Progress Note      Patient: Marcella Palmer MRN: [id number]  SSN: [id number]    YOB: 1960  Age: 59 y.o. Sex: male      Admit Date: 7/24/2017    Hospital Problem List:     Hospital Problems  Date Reviewed: 7/4/2017          Codes Class Noted POA    Sepsis [facility]) ICD-10-CM: A41.9  ICD-9-CM: 038.9, 995.91  7/24/2017 Unknown            -s/p removal of infected R femoral bypass graft 7/25/17 with repair of superficial and common femoral arteries  -Acute blood loss anemia post-operatively, Hgb 8.8  -Admitted to [place] [address] 7/22/17 for acute SOB. Treated for acute CHF/COPD exacerbation, diuresed. -Repeat echo during admission showed normal LV function with R sided RV dilation and decreased RV systolic function.   -Sepsis, from infected graft, improved  -Infected L femoral graft  -leukocytosis d/t above, improving on abx.   -Hx of multiple vascular bypass surgeries including ax-fem bypass with jump fem-fem bypass on left and right fem-pop bypass. Recent blow out of left femoral anastomosis, had left CFA ligation with jump bypass from right to left fem-fem bypass.  -Pulm HTN    -Chronic afib, on coumadin as outpt, s/p external cardioversion April 2017, currently in NSR.    -HLD  -HTN, on statin  -CAD, status post PTCA of right coronary and circumflex lesions  -ICYMO  -PAD and PVD  -Spinal stenosis    Assessment & Plan:     -Will monitor volume status closely. Currently not on any diuretics. Appears euvolemic. Discussed importance of monitoring symptoms of sob, orthopnea, and le swelling with pt and daughter. Daily weights. Monitor output. -Monitor H/H  -Hemodynamics are stable. Rhythm stable. HR stable. -Continue current regimen: amio, asa, statin, diltiazem, and lopressor.   -Off coumadin sunday-operatively. Resume when ok with vascular team.    Subjective:     No new complaints.      Objective:      Patient Vitals for the past 8 hrs:   Temp Pulse Resp BP SpO2   07/26/17 0830 - - - - 99 %   07/26/17 0827 97.4 °F (36.3 °C) 69 18 136/83 99 %   07/26/17 0730 - 70 16 128/73 97 %   07/26/17 0715 - 61 12 118/66 99 %   07/26/17 0700 - 61 12 123/64 95 %   07/26/17 0645 - 61 13 114/68 98 %   07/26/17 0630 - 62 12 110/64 100 %   07/26/17 0615 - 62 14 115/67 100 %   07/26/17 0600 - 65 14 121/71 97 %   07/26/17 0545 - 64 14 119/67 98 %         Patient Vitals for the past 96 hrs:   Weight   07/26/17 0242 76.5 kg (168 lb 10.4 oz)   07/25/17 0033 77.4 kg (170 lb 10.2 oz)   07/24/17 1318 77.4 kg (170 lb 10.2 oz)         Intake/Output Summary (Last 24 hours) at 07/26/17 1330  Last data filed at 07/26/17 0830   Gross per 24 hour   Intake             1320 ml   Output              315 ml   Net             1005 ml       Physical Exam:  General:  Awake, alert, oriented x 3, daughter at bedside  Neck:  Supple, no jvd  Lungs:  Diminished breath sounds in bilat bases, few crackles in R base, on NC O2  Heart: irreg rate and rhythm  Abdomen:  Soft, non-tender  Extremities: LLE with dressing to groin c/d/i, extremities bilat cool but without cyanosis, no swelling    Data Review:     Labs: Results:       Chemistry Recent Labs      07/26/17   0057  07/25/17   0052  07/24/17   1930   GLU  95  138*  156*   NA  134*  132*  133*   K  3.8  3.8  4.0   CL  103  101  101   CO2  23  22  22   BUN  15  11  11   CREA  0.73  0.72  0.82   CA  8.0*  8.3*  7.9*   AGAP  8  9  10   BUCR  21*  15  13   AP  516*  674*   --    TP  6.1*  6.8   --    ALB  2.0*  2.2*   --    GLOB  4.1*  4.6*   --    AGRAT  0.5*  0.5*   --       CBC w/Diff Recent Labs      07/26/17   1048  07/26/17   0057  07/25/17   1849  07/25/17   0052   WBC   --   19.5*  15.6*  13.9*   RBC   --   2.59*  2.57*  3.10*   HGB  8.8*  7.4*  7.2*  8.6*   HCT  26.0*  21.6*  21.0*  25.6*   PLT   --   300  325  337   GRANS   --   74   --   86*   LYMPH   --   6*   --   8*   EOS   --   0   --   0      Cardiac Enzymes No results found for: CPK, CKMMB, CKMB, RCK3, CKMBT, CKNDX, CKND1, LEO, TROPT, TROIQ, DEWAYNE, TROPT, TNIPOC, BNP, BNPP   Coagulation Recent Labs      07/25/17   0052   PTP  13.2   INR  1.0       Lipid Panel Lab Results   Component Value Date/Time    Cholesterol, total 157 12/29/2015 07:18 AM    HDL Cholesterol 28 12/29/2015 07:18 AM    LDL, calculated 112.4 12/29/2015 07:18 AM    VLDL, calculated 16.6 12/29/2015 07:18 AM    Triglyceride 83 12/29/2015 07:18 AM    CHOL/HDL Ratio 5.6 12/29/2015 07:18 AM      BNP No results found for: BNP, BNPP, XBNPT   Liver Enzymes Recent Labs      07/26/17   0057   TP  6.1*   ALB  2.0*   AP  516*   SGOT  70*      Digoxin    Thyroid Studies Lab Results   Component Value Date/Time    TSH 5.970 07/13/2017 12:00 AM    TSH 5.700 05/26/2017 12:00 AM            Signed By: JUNE Lott     July 26, 2017

## 2017-07-26 NOTE — PROGRESS NOTES
Pt s/p surgery yesterday. Out of ICU and on CVT step down now  Removal of infected grafts left side  Pain well controlled  H/h 8.8/26 - acute blood loss anemia.  Will transfuse if <8  As Dr Fran Castaneda explained and reviewed with pt and daughter today as well, due to prior procedures, he did have improved perfusion to the left leg in spite of removal of bypasses  If develops rest pain or signs of tissue loss, will need left ax-pop bypass, no sooner than next week  But if no rest pain develops, will just manage medically and continue wound care  Appreciate close cardiology follow up

## 2017-07-26 NOTE — PROGRESS NOTES
@7133 Pt's left foot feels more cool, unable to doppler a pulse, and pt states he cannot wiggle his toes. Spoke with Dr Sharyle Sand and notified of the current status of the pt. Dr Sharyle Sand spoke with Dr Sofia Ellison - states they are aware and this is expected - cont to monitor at this time. @3077 Pt able to wiggle toes and sensation intact.

## 2017-07-26 NOTE — OP NOTES
[address]    Name:  Sheri Gale  MR#:  [id number]  :  1960  Account #:  [de-identified]  Date of Adm:  2017  Date of Surgery:  2017      PREOPERATIVE DIAGNOSIS: Infected left lower extremity graft. POSTOPERATIVE DIAGNOSIS: Infected left lower extremity graft. PROCEDURES PERFORMED:  1. Removal of infected graft. 2. Repair of superficial femoral artery, left side. 3. Repair of common femoral artery. CULTURES: Left leg. ESTIMATED BLOOD LOSS: 700 mL. DRAINS:  None. SPECIMENS REMOVED: infected graft. ANESTHESIA: General.    INDICATIONS FOR PROCEDURE: The patient is a 78-year-old  gentleman in septic shock and had septicemia. Currently, the patient  has a known infected left groin graft. The septic shock has resolved. The patient is in need of removal of the infected graft. The patient was  given the risks and benefits of the procedure including but not limited  to bleeding, infection, damage to adjacent structures, MI, stroke, and  death, as well as loss of lower extremity and need for further surgery. The patient was understanding of all the risks and benefits of the  procedure. DESCRIPTION OF PROCEDURE: The patient was correctly identified  in the ICU and taken to the operating room in stable condition. The  patient had a pre-incision timeout prior to the incision. The patient was  prepped and draped in normal sterile fashion according to anesthesia  guidance and aseptic technique. We then were able to make sure that  the patient was kept on his preoperative antibiotics schedule. We then  made an incision in the suprapubic area and dissected down to the  femoral-femoral bypass. We were able to ligate the femoral-femoral  bypass and oversew both portions with CV5 suture. We then were able  to copiously irrigate the wound, closing in a 3-0 Vicryl deep dermal  layer, 4-0 Vicryl subcuticular layer and Dermabond for dressing. We  then were able to turn our attention to the left thigh. I removed the  staples and opened up the wound. There was some old hematoma in  this area. This was removed. We identified the bypass that was  present. We attempted to dissect around the superficial femoral artery  and while we were doing this, the graft avulsed off the SFA. So we  completed the avulsion and then oversewed it with a 3-0 Prolene  suture in an imbricated fashion. This was able to take care of the  bleeding. Once this was done, all of the bleeding had stopped and then  we were able to turn our attention to the groin. We then removed the  graft in this area out of the jump bypass from the common femoral to  the SFA area and then removed the rest of the fem-fem piece. All of  this graft was then removed. At this point, we then did our cultures of  the thigh and groin area at both areas. We then were able to identify  some continued fem-fem graft onto the common femoral artery. We  started dissecting this off and this too avulsed off the common femoral  artery. This also took multiple sutures of 3-0 Prolene suture to get this  to stop but once we were able to get everything stopped, we then were  able to copiously irrigate both wounds with bacitracin irrigation and  then we were able to close the muscle over the SFA with a 3-0 Vicryl  running suture and then place our silver granular foam over each  wound. The groin wound measured 8 cm x 2.5 cm x 2 cm. The thigh  wound measures 7.5 cm x 2.5 cm x 4 cm and then once this was done,  we were able to take the patient back to the ICU in critical condition.         MD Tsering Vasques / Rashad Craig  D:  07/25/2017   20:13  T:  07/25/2017   22:50  Job #:  [id number]

## 2017-07-26 NOTE — PROGRESS NOTES
NUTRITION    Nursing Referral: [facility]     RECOMMENDATIONS / PLAN:     - Add supplements: Ensure Enlive TID.  - Continue RD inpatient monitoring and evaluation. NUTRITION INTERVENTIONS & DIAGNOSIS:     [x] Meals/Snacks: modified diet  [x] Medical food supplementation: initiate   [x] Vitamin/mineral supplementation: ascorbic acid, cholecalciferol, cyanocobalamin, ferrous sulfate    Nutrition Diagnosis: Unintentional weight loss related to inadequate energy intake as evidenced by 20 lb, 11% weight loss x 4 months. ASSESSMENT:     7/26: Diet resumed after procedure yesterday evening. S/p vascular surgery. 7/25: NPO for possible procedure today. Appetite poor for several days PTA. Agreeable to supplements. Average po intake adequate to meet patients estimated nutritional needs:   [] Yes     [x] No   [] Unable to determine at this time    Diet: DIET CARDIAC Regular       Food Allergies: NKFA  Current Appetite:   [] Good     [] Fair     [] Poor     [x] Other: NPO  Appetite/meal intake prior to admission:   [] Good     [x] Fair     [x] Poor x 3-4 days PTA, eating well before then    [] Other:  Feeding Limitations:  [] Swallowing difficulty    [x] Chewing difficulty: missing teeth    [] Other:  Current Meal Intake: No data found.     BM: 7/23   Skin Integrity: right ankle vascular wound, leg vascular wound, groin vascular wound; wound VAC to groin/leg wound  Edema: none   Pertinent Medications: Reviewed: colace, Zofran    Recent Labs      07/26/17   0057  07/25/17   0052  07/24/17   1930   NA  134*  132*  133*   K  3.8  3.8  4.0   CL  103  101  101   CO2  23  22  22   GLU  95  138*  156*   BUN  15  11  11   CREA  0.73  0.72  0.82   CA  8.0*  8.3*  7.9*   ALB  2.0*  2.2*   --    SGOT  70*  101*   --    ALT  96*  123*   --        Intake/Output Summary (Last 24 hours) at 07/26/17 0911  Last data filed at 07/26/17 5069   Gross per 24 hour   Intake             1320 ml   Output              315 ml   Net             1005 ml       Anthropometrics:  Ht Readings from Last 1 Encounters:   07/24/17 5' 10\" (1.778 m)     Last 3 Recorded Weights in this Encounter    07/24/17 1318 07/25/17 0033 07/26/17 0242   Weight: 77.4 kg (170 lb 10.2 oz) 77.4 kg (170 lb 10.2 oz) 76.5 kg (168 lb 10.4 oz)     Body mass index is 24.2 kg/(m^2). Weight History: pt reports weight loss PTA, previous weights: 190 lb in March 2017 and 180 lb in April 2017 (20 lb, 11% weight loss x 4 months)    Weight Metrics 7/26/2017 7/24/2017 7/17/2017 7/12/2017 6/27/2017 6/22/2017 6/19/2017   Weight 168 lb 10.4 oz - 156 lb 156 lb 14.4 oz - 165 lb 160 lb   BMI - 24.2 kg/m2 22.38 kg/m2 - 22.51 kg/m2 23.68 kg/m2 22.96 kg/m2        Admitting Diagnosis: Severe sepsis with septic shock  Sepsis (HCC)  DX  Pertinent PMHx: CAD, HTN, PAD    Education Needs:        [x] None identified  [] Identified - Not appropriate at this time  []  Identified and addressed - refer to education log  Learning Limitations:   [x] None identified  [] Identified    Cultural, Roman [employer] & ethnic food preferences:  [x] None identified    [] Identified and addressed     ESTIMATED NUTRITION NEEDS:     Calories: 9403-1054 kcal (MSJx1.2-1.3) based on  [x] Actual BW 77 kg     [] IBW   Protein: 62-92 gm (0.8-1.2 gm/kg) based on  [x] Actual BW      [] IBW   Fluid: 1 mL/kcal     MONITORING & EVALUATION:     Nutrition Goal(s):   1. Po intake of meals will meet >75% of patient estimated nutritional needs within the next 7 days.   Outcome:  [] Met/Ongoing    [x]  Not Met/Progressing    [] New/Initial Goal     Monitoring:   [x] Diet tolerance   [x] Meal intake   [x] Supplement intake   [] GI symptoms/ability to tolerate po diet   [] Respiratory status   [] Plan of care      Previous Recommendations (for follow-up assessments only):     [x]   Implemented       []   Not Implemented (RD to address)     [] No Recommendation Made     Discharge Planning: cardiac diet  [x] Participated in care planning, discharge planning, & interdisciplinary rounds as appropriate      Yoshi Carrasco, [address] [place] [address], [address] [place]    Pager: [phone]

## 2017-07-26 NOTE — ROUTINE PROCESS
Bedside and Verbal shift change report given to Sunday Ramires RN (oncoming nurse) by Jazzy Castelan RN   (offgoing nurse). Report included the following information SBAR, Kardex, Intake/Output, MAR, Accordion, Recent Results and Med Rec Status.

## 2017-07-27 LAB
ABO + RH BLD: NORMAL
ALBUMIN SERPL BCP-MCNC: 2 G/DL (ref 3.4–5)
ALBUMIN/GLOB SERPL: 0.5 {RATIO} (ref 0.8–1.7)
ALP SERPL-CCNC: 768 U/L (ref 45–117)
ALT SERPL-CCNC: 74 U/L (ref 16–61)
ANION GAP BLD CALC-SCNC: 7 MMOL/L (ref 3–18)
AST SERPL W P-5'-P-CCNC: 52 U/L (ref 15–37)
BACTERIA SPEC CULT: ABNORMAL
BACTERIA SPEC CULT: ABNORMAL
BASOPHILS # BLD AUTO: 0 K/UL (ref 0–0.1)
BASOPHILS # BLD: 0 % (ref 0–2)
BILIRUB SERPL-MCNC: 0.7 MG/DL (ref 0.2–1)
BLD PROD TYP BPU: NORMAL
BLD PROD TYP BPU: NORMAL
BLOOD BANK CMNT PATIENT-IMP: NORMAL
BLOOD GROUP ANTIBODIES SERPL: NORMAL
BPU ID: NORMAL
BPU ID: NORMAL
BUN SERPL-MCNC: 9 MG/DL (ref 7–18)
BUN/CREAT SERPL: 11 (ref 12–20)
CALCIUM SERPL-MCNC: 8.2 MG/DL (ref 8.5–10.1)
CALLED TO:,BCALL1: NORMAL
CHLORIDE SERPL-SCNC: 102 MMOL/L (ref 100–108)
CO2 SERPL-SCNC: 24 MMOL/L (ref 21–32)
CREAT SERPL-MCNC: 0.79 MG/DL (ref 0.6–1.3)
CROSSMATCH RESULT,%XM: NORMAL
CROSSMATCH RESULT,%XM: NORMAL
DATE LAST DOSE: NORMAL
DIFFERENTIAL METHOD BLD: ABNORMAL
EOSINOPHIL # BLD: 0.2 K/UL (ref 0–0.4)
EOSINOPHIL NFR BLD: 1 % (ref 0–5)
ERYTHROCYTE [DISTWIDTH] IN BLOOD BY AUTOMATED COUNT: 16.3 % (ref 11.6–14.5)
GLOBULIN SER CALC-MCNC: 4.3 G/DL (ref 2–4)
GLUCOSE SERPL-MCNC: 95 MG/DL (ref 74–99)
GRAM STN SPEC: ABNORMAL
HCT VFR BLD AUTO: 25.8 % (ref 36–48)
HGB BLD-MCNC: 8.7 G/DL (ref 13–16)
LYMPHOCYTES # BLD AUTO: 11 % (ref 21–52)
LYMPHOCYTES # BLD: 1.9 K/UL (ref 0.9–3.6)
MCH RBC QN AUTO: 28.3 PG (ref 24–34)
MCHC RBC AUTO-ENTMCNC: 33.7 G/DL (ref 31–37)
MCV RBC AUTO: 84 FL (ref 74–97)
MONOCYTES # BLD: 1 K/UL (ref 0.05–1.2)
MONOCYTES NFR BLD AUTO: 6 % (ref 3–10)
NEUTS SEG # BLD: 14.8 K/UL (ref 1.8–8)
NEUTS SEG NFR BLD AUTO: 82 % (ref 40–73)
PLATELET # BLD AUTO: 320 K/UL (ref 135–420)
PMV BLD AUTO: 10 FL (ref 9.2–11.8)
POTASSIUM SERPL-SCNC: 3.8 MMOL/L (ref 3.5–5.5)
PROT SERPL-MCNC: 6.3 G/DL (ref 6.4–8.2)
RBC # BLD AUTO: 3.07 M/UL (ref 4.7–5.5)
REPORTED DOSE,DOSE: NORMAL UNITS
REPORTED DOSE/TIME,TMG: 300
SERVICE CMNT-IMP: ABNORMAL
SERVICE CMNT-IMP: ABNORMAL
SODIUM SERPL-SCNC: 133 MMOL/L (ref 136–145)
SPECIMEN EXP DATE BLD: NORMAL
STATUS OF UNIT,%ST: NORMAL
STATUS OF UNIT,%ST: NORMAL
UNIT DIVISION, %UDIV: 0
UNIT DIVISION, %UDIV: 0
VANCOMYCIN TROUGH SERPL-MCNC: 16.8 UG/ML (ref 10–20)
WBC # BLD AUTO: 17.9 K/UL (ref 4.6–13.2)

## 2017-07-27 PROCEDURE — 65660000004 HC RM CVT STEPDOWN

## 2017-07-27 PROCEDURE — 74011250636 HC RX REV CODE- 250/636: Performed by: PHYSICIAN ASSISTANT

## 2017-07-27 PROCEDURE — 74011250637 HC RX REV CODE- 250/637: Performed by: PHYSICIAN ASSISTANT

## 2017-07-27 PROCEDURE — 36415 COLL VENOUS BLD VENIPUNCTURE: CPT | Performed by: SURGERY

## 2017-07-27 PROCEDURE — 80053 COMPREHEN METABOLIC PANEL: CPT | Performed by: SURGERY

## 2017-07-27 PROCEDURE — 85025 COMPLETE CBC W/AUTO DIFF WBC: CPT | Performed by: SURGERY

## 2017-07-27 PROCEDURE — 74011000258 HC RX REV CODE- 258: Performed by: PHYSICIAN ASSISTANT

## 2017-07-27 PROCEDURE — 77030011256 HC DRSG MEPILEX <16IN NO BORD MOLN -A

## 2017-07-27 PROCEDURE — 80202 ASSAY OF VANCOMYCIN: CPT | Performed by: SURGERY

## 2017-07-27 PROCEDURE — 97165 OT EVAL LOW COMPLEX 30 MIN: CPT

## 2017-07-27 PROCEDURE — 74011250636 HC RX REV CODE- 250/636: Performed by: SURGERY

## 2017-07-27 PROCEDURE — 97161 PT EVAL LOW COMPLEX 20 MIN: CPT

## 2017-07-27 RX ADMIN — Medication 10 ML: at 21:15

## 2017-07-27 RX ADMIN — MORPHINE SULFATE: 5 INJECTION, SOLUTION INTRAVENOUS at 20:38

## 2017-07-27 RX ADMIN — PIPERACILLIN SODIUM,TAZOBACTAM SODIUM 3.38 G: 3; .375 INJECTION, POWDER, FOR SOLUTION INTRAVENOUS at 18:07

## 2017-07-27 RX ADMIN — OXYCODONE AND ACETAMINOPHEN 1 TABLET: 5; 325 TABLET ORAL at 11:49

## 2017-07-27 RX ADMIN — DILTIAZEM HYDROCHLORIDE 30 MG: 30 TABLET, FILM COATED ORAL at 18:04

## 2017-07-27 RX ADMIN — GABAPENTIN 100 MG: 100 CAPSULE ORAL at 15:24

## 2017-07-27 RX ADMIN — ATORVASTATIN CALCIUM 40 MG: 40 TABLET, FILM COATED ORAL at 21:15

## 2017-07-27 RX ADMIN — VANCOMYCIN HYDROCHLORIDE 750 MG: 10 INJECTION, POWDER, LYOPHILIZED, FOR SOLUTION INTRAVENOUS at 02:41

## 2017-07-27 RX ADMIN — Medication 10 ML: at 05:32

## 2017-07-27 RX ADMIN — Medication 250 MG: at 09:10

## 2017-07-27 RX ADMIN — Medication 10 ML: at 15:03

## 2017-07-27 RX ADMIN — METOPROLOL TARTRATE 12.5 MG: 25 TABLET ORAL at 09:10

## 2017-07-27 RX ADMIN — Medication 10 ML: at 21:16

## 2017-07-27 RX ADMIN — CYANOCOBALAMIN TAB 1000 MCG 1000 MCG: 1000 TAB at 09:10

## 2017-07-27 RX ADMIN — Medication 325 MG: at 09:10

## 2017-07-27 RX ADMIN — DILTIAZEM HYDROCHLORIDE 30 MG: 30 TABLET, FILM COATED ORAL at 15:03

## 2017-07-27 RX ADMIN — PIPERACILLIN SODIUM,TAZOBACTAM SODIUM 3.38 G: 3; .375 INJECTION, POWDER, FOR SOLUTION INTRAVENOUS at 05:31

## 2017-07-27 RX ADMIN — ENOXAPARIN SODIUM 40 MG: 40 INJECTION SUBCUTANEOUS at 18:04

## 2017-07-27 RX ADMIN — LOSARTAN POTASSIUM 25 MG: 25 TABLET ORAL at 09:10

## 2017-07-27 RX ADMIN — MORPHINE SULFATE: 5 INJECTION, SOLUTION INTRAVENOUS at 19:38

## 2017-07-27 RX ADMIN — DILTIAZEM HYDROCHLORIDE 30 MG: 30 TABLET, FILM COATED ORAL at 21:15

## 2017-07-27 RX ADMIN — AMIODARONE HYDROCHLORIDE 200 MG: 200 TABLET ORAL at 09:10

## 2017-07-27 RX ADMIN — PIPERACILLIN SODIUM,TAZOBACTAM SODIUM 3.38 G: 3; .375 INJECTION, POWDER, FOR SOLUTION INTRAVENOUS at 11:50

## 2017-07-27 RX ADMIN — DILTIAZEM HYDROCHLORIDE 30 MG: 30 TABLET, FILM COATED ORAL at 09:10

## 2017-07-27 RX ADMIN — GABAPENTIN 100 MG: 100 CAPSULE ORAL at 09:10

## 2017-07-27 RX ADMIN — Medication 5 ML: at 15:03

## 2017-07-27 RX ADMIN — GABAPENTIN 100 MG: 100 CAPSULE ORAL at 21:15

## 2017-07-27 RX ADMIN — PIPERACILLIN SODIUM,TAZOBACTAM SODIUM 3.38 G: 3; .375 INJECTION, POWDER, FOR SOLUTION INTRAVENOUS at 23:53

## 2017-07-27 RX ADMIN — DULOXETINE HYDROCHLORIDE 60 MG: 60 CAPSULE, DELAYED RELEASE ORAL at 09:10

## 2017-07-27 RX ADMIN — VANCOMYCIN HYDROCHLORIDE 750 MG: 10 INJECTION, POWDER, LYOPHILIZED, FOR SOLUTION INTRAVENOUS at 15:02

## 2017-07-27 RX ADMIN — ASPIRIN 81 MG 81 MG: 81 TABLET ORAL at 09:10

## 2017-07-27 RX ADMIN — METOPROLOL TARTRATE 12.5 MG: 25 TABLET ORAL at 21:15

## 2017-07-27 RX ADMIN — VITAMIN D, TAB 1000IU (100/BT) 1000 UNITS: 25 TAB at 09:10

## 2017-07-27 RX ADMIN — DOCUSATE SODIUM 100 MG: 100 CAPSULE, LIQUID FILLED ORAL at 09:10

## 2017-07-27 RX ADMIN — VANCOMYCIN HYDROCHLORIDE 750 MG: 10 INJECTION, POWDER, LYOPHILIZED, FOR SOLUTION INTRAVENOUS at 20:08

## 2017-07-27 NOTE — ROUTINE PROCESS
Bedside shift change report given to BEAU Parson   (oncoming nurse) by Drake Hall RN   (offgoing nurse). Report included the following information SBAR, Kardex, ED Summary, OR Summary, Procedure Summary, Intake/Output, MAR and Med Rec Status.

## 2017-07-27 NOTE — PROGRESS NOTES
[facility]  -  Progress Note      Patient: Naima Gutierrez MRN: [id number]  SSN: [id number]    YOB: 1960  Age: 59 y.o. Sex: male      Admit Date: 7/24/2017    Hospital Problem List:     Hospital Problems  Date Reviewed: 7/4/2017          Codes Class Noted POA    Sepsis [facility]) ICD-10-CM: A41.9  ICD-9-CM: 038.9, 995.91  7/24/2017 Unknown            -s/p removal of infected R femoral bypass graft 7/25/17 with repair of superficial and common femoral arteries  -Acute blood loss anemia post-operatively, Hgb 8.8  -Admitted to [facility] 7/22/17 for acute SOB. Treated for acute CHF/COPD exacerbation, diuresed. -Repeat echo during admission showed normal LV function with R sided RV dilation and decreased RV systolic function.   -Sepsis, from infected graft, improved  -Infected L femoral graft  -leukocytosis d/t above, improving on abx.   -Hx of multiple vascular bypass surgeries including ax-fem bypass with jump fem-fem bypass on left and right fem-pop bypass. Recent blow out of left femoral anastomosis, had left CFA ligation with jump bypass from right to left fem-fem bypass.  -Pulm HTN    -Chronic afib, on coumadin as outpt, s/p external cardioversion April 2017, currently in NSR.    -HLD  -HTN, on statin  -CAD, status post PTCA of right coronary and circumflex lesions  -ICYMO  -PAD and PVD  -Spinal stenosis    Assessment & Plan:     -Hemodynamics stable. Good post-op course thus far. -Continue on amio, asa, statin, losartan, and diltiazem. Would resume metoprolol. -[facility] per [facility] recs. Subjective:     No new complaints. Pleasant and talkative. Denies pain or sob.     Objective:      Patient Vitals for the past 8 hrs:   Temp Pulse Resp BP SpO2   07/27/17 0902 98.1 °F (36.7 °C) 76 18 138/77 95 %   07/27/17 0400 97.9 °F (36.6 °C) 77 18 151/81 93 %         Patient Vitals for the past 96 hrs:   Weight   07/27/17 0400 76.6 kg (168 lb 14.4 oz)   07/26/17 0242 76.5 kg (168 lb 10.4 oz) 07/25/17 0033 77.4 kg (170 lb 10.2 oz)   07/24/17 1318 77.4 kg (170 lb 10.2 oz)         Intake/Output Summary (Last 24 hours) at 07/27/17 1059  Last data filed at 07/27/17 0453   Gross per 24 hour   Intake             1330 ml   Output              900 ml   Net              430 ml       Physical Exam:  General: awake, alert, oriented x 3  Neck:  Supple, no jvd  Lungs:  Diminished bases, otherwise clear  Heart:  Reg rate and rhythm  Abdomen: soft, no organomegaly, non-tender  Extremities:  No LE edema, warm and well perfused, dressing c/d/i to L groin.     Data Review:     Labs: Results:       Chemistry Recent Labs      07/27/17   0510  07/26/17   0057  07/25/17   0052   GLU  95  95  138*   NA  133*  134*  132*   K  3.8  3.8  3.8   CL  102  103  101   CO2  24  23  22   BUN  9  15  11   CREA  0.79  0.73  0.72   CA  8.2*  8.0*  8.3*   AGAP  7  8  9   BUCR  11*  21*  15   AP  768*  516*  674*   TP  6.3*  6.1*  6.8   ALB  2.0*  2.0*  2.2*   GLOB  4.3*  4.1*  4.6*   AGRAT  0.5*  0.5*  0.5*      CBC w/Diff Recent Labs      07/27/17   0510  07/26/17   1048  07/26/17   0057  07/25/17   1849  07/25/17   0052   WBC  17.9*   --   19.5*  15.6*  13.9*   RBC  3.07*   --   2.59*  2.57*  3.10*   HGB  8.7*  8.8*  7.4*  7.2*  8.6*   HCT  25.8*  26.0*  21.6*  21.0*  25.6*   PLT  320   --   300  325  337   GRANS  82*   --   74   --   86*   LYMPH  11*   --   6*   --   8*   EOS  1   --   0   --   0      Cardiac Enzymes No results found for: CPK, CKMMB, CKMB, RCK3, CKMBT, CKNDX, CKND1, LEO, TROPT, TROIQ, DEWAYNE, TROPT, TNIPOC, BNP, BNPP   Coagulation Recent Labs      07/25/17   0052   PTP  13.2   INR  1.0       Lipid Panel Lab Results   Component Value Date/Time    Cholesterol, total 157 12/29/2015 07:18 AM    HDL Cholesterol 28 12/29/2015 07:18 AM    LDL, calculated 112.4 12/29/2015 07:18 AM    VLDL, calculated 16.6 12/29/2015 07:18 AM    Triglyceride 83 12/29/2015 07:18 AM    CHOL/HDL Ratio 5.6 12/29/2015 07:18 AM      BNP No results found for: BNP, BNPP, XBNPT   Liver Enzymes Recent Labs      07/27/17   0510   TP  6.3*   ALB  2.0*   AP  768*   SGOT  52*      Digoxin    Thyroid Studies Lab Results   Component Value Date/Time    TSH 5.970 07/13/2017 12:00 AM    TSH 5.700 05/26/2017 12:00 AM            Signed By: JUNE Yousif     July 27, 2017

## 2017-07-27 NOTE — PROGRESS NOTES
Problem: Mobility Impaired (Adult and Pediatric)  Goal: *Acute Goals and Plan of Care (Insert Text)  Physical Therapy Goals  Initiated 7/27/2017 and to be accomplished within 7 day(s)  1. Patient will move from supine to sit and sit to supine , scoot up and down and roll side to side in bed with supervision/set-up. 2. Patient will transfer from bed to chair and chair to bed with supervision/set-up using the least restrictive device. 3. Patient will perform sit to stand with supervision/set-up. 4. Patient will ambulate with supervision/set-up for >100 feet with the least restrictive device. 5. Patient will ascend/descend 4 stairs with 1 handrail(s) with supervision/set-up. PHYSICAL THERAPY EVALUATION     Patient: Isai Buck (14 y.o. male)  Date: 7/27/2017  Primary Diagnosis: Severe sepsis with septic shock  Sepsis ([facility] [address]  DX  Procedure(s) (LRB):  REMOVAL OF LEFT LEG INFECTED GRAFT (Left) 2 Days Post-Op   Precautions: Fall, LLE WBAT      ASSESSMENT :  Pt is 63yo M admitted to hospital for LLE infected graft. Pt presents today supine in bed with wound vac on LLE and pt educated on WBAT status. Pt transferred sup to sit and sat EOB for objective assessment. Pt then performed sit to stand transfer and stood with fair balance. Pt took several side steps towards [facility] with RW but was limited by pain. Pt then transferred to supine and was assisted towards [facility]. Pt was left resting with call bell by his side and denied need for further assistance. Pt currently demonstrates decreased mobility and endurance from baseline and will benefit from skilled intervention to address the above impairments.   Patients rehabilitation potential is considered to be Fair  Factors which may influence rehabilitation potential include:   [ ]         None noted  [ ]         Mental ability/status  [X]         Medical condition  [X]         Home/family situation and support systems  [X]         Safety awareness  [X]         Pain tolerance/management  [ ]         Other:        PLAN :  Recommendations and Planned Interventions:  [X]           Bed Mobility Training             [X]    Neuromuscular Re-Education  [X]           Transfer Training                   [ ]    Orthotic/Prosthetic Training  [X]           Gait Training                          [ ]    Modalities  [X]           Therapeutic Exercises          [ ]    Edema Management/Control  [X]           Therapeutic Activities            [X]    Patient and Family Training/Education  [ ]           Other (comment):     Frequency/Duration: Patient will be followed by physical therapy 1-2 times per day/4-7 days per week to address goals. Discharge Recommendations: Rehab  Further Equipment Recommendations for Discharge: N/A       G-CODES      Mobility  Current  CK= 40-59%   Goal  CI= 1-19%. The severity rating is based on the Level of Assistance required for Functional Mobility and ADLs.            G-CODES      Eval Complexity: History: MEDIUM  Complexity : 1-2 comorbidities / personal factors will impact the outcome/ POC Exam:LOW Complexity : 1-2 Standardized tests and measures addressing body structure, function, activity limitation and / or participation in recreation  Presentation: LOW Complexity : Stable, uncomplicated  Clinical Decision Making:Low Complexity   Overall Complexity:LOW       SUBJECTIVE:   Patient stated They'll have to start calling me pin-cushion with all these lines and tubes.       OBJECTIVE DATA SUMMARY:       Past Medical History:   Diagnosis Date    Acute blood loss as cause of postoperative anemia 4/4/2017    Anticoagulated on Coumadin      Aortoiliac occlusive disease (HCC) 1/25/2017    Atherosclerosis of native artery of both lower extremities with intermittent claudication ([facility] [address] 1/25/2017    Benign hypertensive heart disease with systolic congestive heart failure, NYHA class 2 ([facility] [address] 1/26/2015    Carotid artery disease (HCC)      Chronic atrial fibrillation ([facility] [address]      Chronic systolic heart failure (HCC)      Chronic ulcer of right foot ([facility] [address] 4/4/2017    Coronary artery disease involving native coronary artery of native heart 3/15/2017     Successful stenting of Cx (Xience LESLEY) and RCA (Xience LESLEY) to 0% by Dr. Kaylee Parker on 3/15/17.     DDD (degenerative disc disease), lumbar 1/26/2015    Dyslipidemia      Erectile dysfunction 7/5/2016    Euthyroid sick syndrome 4/6/2017    Hereditary peripheral neuropathy 11/15/2016    History of cardioversion 4/18/2017     S/P Synchronized external cardioversion (4/18/2017 - Dr. Willy Ko)    Hypertension      Hyperuricemia      Ischemic cardiomyopathy      Peripheral artery disease ([facility] [address] 1/25/2017    Spinal stenosis of lumbar region with radiculopathy 5/4/2015     Dr. Si Dancer Vitamin D deficiency 4/22/2017     Vitamin D 25-Hydroxy (4/22/2017) = 12.0     Past Surgical History:   Procedure Laterality Date    CARDIAC CATHETERIZATION   3/8/2017          CARDIAC CATHETERIZATION   3/15/2017          CORONARY STENT SINGLE W/PTCA   3/15/2017          HX COLONOSCOPY   07/23/2015     polyps repeat 2020 5 years Tarik Henderson    HX FEMORAL BYPASS Right 04/04/2017     S/P Right axillary to bifemoral artery bypass using an 8 mm Propaten graft from the right axilla to the right common femoral artery with a jump femoral-femoral bypass with another 8 mm Propaten external ring bypass; Right common femoral artery, and profunda femoris artery and superficial femoral artery endarterectomy causing an extensive surgery on the right side (4/4/2017 - Dr. Mane Correia)    [address] Right 04/07/2017     S/P Cutdown of femoral-femoral bypass, more on the left groin side; Right lower extremity angiography with first-order catheterization (4/7/2017 - Dr. Tatiana Whtie)    HX FEMORAL BYPASS Right 04/10/2017     S/P Right femoral to above-knee popliteal bypass with an 8 mm PTFE graft (4/10/2017 - Dr. Correa Davidson)     Prior Level of Function/Home Situation: Upon D/C patient plans to return home to his daughter's house where there are 4STE with 2 stories and no possibility of FFSU. Pt reports being independent with mobility and I/ADL's and has RW at home if needed. Home Situation  Home Environment: Private residence  # Steps to Enter: 8  One/Two Story Residence: One story  Living Alone: No  Support Systems: Child(mile), Family member(s)  Patient Expects to be Discharged to[de-identified] Private residence  Current DME Used/Available at Home: None  Critical Behavior:    Pleasant, cooperative, A&Ox4  Strength:    Strength: Generally decreased, functional (RLE 4+/5, L knee ext 3-/5, flex 3/5, DF 4/5)   Tone & Sensation:    Sensation: Intact (BLE to LT)   Range Of Motion:  AROM: Generally decreased, functional (L hip flexion and knee ext limited due to pain and wound vac)   Functional Mobility:  Bed Mobility:   Supine to Sit: Minimum assistance; Additional time  Sit to Supine: Minimum assistance  Scooting: Minimum assistance  Transfers:  Sit to Stand: Minimum assistance;Assist x2  Stand to Sit: Minimum assistance      Balance:   Sitting: Intact  Standing: Impaired; With support  Standing - Static: Good  Standing - Dynamic : Fair  Ambulation/Gait Training:    Pt took side steps towards HOB  Left Side Weight Bearing: As tolerated   Interventions: Verbal cues     Pain:  Pt reports 10/10 pain or discomfort prior to treatment.  (pt has PCA in room)  Pt reports 10/10 pain or discomfort post treatment. Activity Tolerance:   Pt tolerated activity well and was motivated to participate in therapy despite LLE pain this session. Please refer to the flowsheet for vital signs taken during this treatment.   After treatment:   [ ]         Patient left in no apparent distress sitting up in chair  [X]         Patient left in no apparent distress in bed  [X]         Call bell left within reach  [ ]         Nursing notified  [ ]         Caregiver present  [ ]         Bed alarm activated      COMMUNICATION/EDUCATION:   [X]         Fall prevention education was provided and the patient/caregiver indicated understanding. [X]         Patient/family have participated as able in goal setting and plan of care. [X]         Patient/family agree to work toward stated goals and plan of care. [ ]         Patient understands intent and goals of therapy, but is neutral about his/her participation. [ ]         Patient is unable to participate in goal setting and plan of care.      Thank you for this referral.  Jeo Post, PT   Time Calculation: 19 mins

## 2017-07-27 NOTE — PROGRESS NOTES
Problem: Self Care Deficits Care Plan (Adult)  Goal: *Acute Goals and Plan of Care (Insert Text)  Occupational Therapy Goals  Initiated 7/27/2017 within 7 day(s). 1. Patient will perform LE bathing with supervision using AE   2. Patient will perform toilet transfers/toileting with contact guard assist.  3. Patient will perform lower body dressing with supervision using AE  Outcome: Progressing Towards Goal  OCCUPATIONAL THERAPY EVALUATION     Patient: Meliton Fox (69 y.o. male)  Date: 7/27/2017  Primary Diagnosis: Severe sepsis with septic shock  Sepsis ([address]  DX  Procedure(s) (LRB):  REMOVAL OF LEFT LEG INFECTED GRAFT (Left) 2 Days Post-Op   Precautions:  Fall, WBAT LLE      ASSESSMENT :  Based on the objective data described below, the patient was supine in bed upon arrival. Patient sat EOB with Min A for bed mobility. Patient's BUE AROM and strength were WFL. Patient simulated LE dressing with Mod A, secondary to incisional pain. Patient simulated BSC transfer with Mod A using rolling walker. Patient declined any further mobility, secondary to high reported pain level. Patient's pain currently limits participation in ADLs. Educated patient on use of AE for dressing/bathing. Patient reported he would be willing to try using AE. Patient would benefit from training in AE for LE dressing and bathing. Patient was left comfortable in bed. Patient will benefit from skilled intervention to address the above impairments.   Patients rehabilitation potential is considered to be Good  Factors which may influence rehabilitation potential include:   [ ]             None noted  [ ]             Mental ability/status  [X]             Medical condition  [ ]             Home/family situation and support systems  [ ]             Safety awareness  [ ]             Pain tolerance/management  [ ]             Other:        PLAN :  Recommendations and Planned Interventions:  [X]               Self Care Training [X]        Therapeutic Activities  [X]               Functional Mobility Training    [ ]        Cognitive Retraining  [X]               Therapeutic Exercises           [ ]        Endurance Activities  [ ]               Balance Training                   [ ]        Neuromuscular Re-Education  [ ]               Visual/Perceptual Training     [X]   Home Safety Training  [X]               Patient Education                 [ ]        Family Training/Education  [ ]               Other (comment):     Frequency/Duration: Patient will be followed by occupational therapy 1-2 times per day/4-7 days per week to address goals. Discharge Recommendations: Rehab  Further Equipment Recommendations for Discharge: N/A       PATIENT COMPLEXITY      Eval Complexity: History: LOW Complexity : Brief history review ; Examination: LOW Complexity : 1-3 performance deficits relating to physical, cognitive , or psychosocial skils that result in activity limitations and / or participation restrictions ; Decision Making:LOW Complexity : No comorbidities that affect functional and no verbal or physical assistance needed to complete eval tasks  Assessment: Low Complexity       G-CODES:      Self Care  Current  CK= 40-59%   Goal  CI= 1-19%. The severity rating is based on the Level of Assistance required for Functional Mobility and ADLs. SUBJECTIVE:   Patient stated Anything that will make this easier.  Referring to AE training      OBJECTIVE DATA SUMMARY:       Past Medical History:   Diagnosis Date    Acute blood loss as cause of postoperative anemia 4/4/2017    Anticoagulated on Coumadin      Aortoiliac occlusive disease ([address] 1/25/2017    Atherosclerosis of native artery of both lower extremities with intermittent claudication ([address] 1/25/2017    Benign hypertensive heart disease with systolic congestive heart failure, NYHA class 2 ([address] 1/26/2015    Carotid artery disease (HCC)      Chronic atrial fibrillation ([address]  Chronic systolic heart failure (HCC)      Chronic ulcer of right foot ([facility] [address] 4/4/2017    Coronary artery disease involving native coronary artery of native heart 3/15/2017     Successful stenting of Cx (Xience LESLEY) and RCA (Xience LESLEY) to 0% by Dr. Paula Alfaro on 3/15/17.     DDD (degenerative disc disease), lumbar 1/26/2015    Dyslipidemia      Erectile dysfunction 7/5/2016    Euthyroid sick syndrome 4/6/2017    Hereditary peripheral neuropathy 11/15/2016    History of cardioversion 4/18/2017     S/P Synchronized external cardioversion (4/18/2017 - Dr. Evelia Jovel)    Hypertension      Hyperuricemia      Ischemic cardiomyopathy      Peripheral artery disease ([facility] [address] 1/25/2017    Spinal stenosis of lumbar region with radiculopathy 5/4/2015     Dr. Ángel Garland Vitamin D deficiency 4/22/2017     Vitamin D 25-Hydroxy (4/22/2017) = 12.0     Past Surgical History:   Procedure Laterality Date    CARDIAC CATHETERIZATION   3/8/2017          CARDIAC CATHETERIZATION   3/15/2017          CORONARY STENT SINGLE W/PTCA   3/15/2017          HX COLONOSCOPY   07/23/2015     polyps repeat 2020 5 years Rashaad Henderson    HX FEMORAL BYPASS Right 04/04/2017     S/P Right axillary to bifemoral artery bypass using an 8 mm Propaten graft from the right axilla to the right common femoral artery with a jump femoral-femoral bypass with another 8 mm Propaten external ring bypass; Right common femoral artery, and profunda femoris artery and superficial femoral artery endarterectomy causing an extensive surgery on the right side (4/4/2017 - Dr. Farideh Herrera)    [address] Right 04/07/2017     S/P Cutdown of femoral-femoral bypass, more on the left groin side; Right lower extremity angiography with first-order catheterization (4/7/2017 - Dr. Fanta High)    HX FEMORAL BYPASS Right 04/10/2017     S/P Right femoral to above-knee popliteal bypass with an 8 mm PTFE graft (4/10/2017 - Dr. Pedro Dietz)     Prior Level of Function/Home Situation: Patient reported that he was independent in self care tasks and functional mobility PTA. Home Situation  Home Environment: Private residence  # Steps to Enter: 5  One/Two Story Residence: Two story  # of Interior Steps: 12  Living Alone: No  Support Systems: Family member(s), Child(mile)  Patient Expects to be Discharged to[de-identified] Private residence  Current DME Used/Available at Home: Walker, rolling  Tub or Shower Type:  (Pt. sponge bathes at sink)  [X]  Right hand dominant          [ ]  Left hand dominant  Cognitive/Behavioral Status:  Neurologic State: Alert  Orientation Level: Oriented X4  Cognition: Follows commands     Skin: No skin changes noted     Edema: No edema noted     Vision/Perceptual:    Acuity: Within Defined Limits (not formally assessed)       Coordination:  Coordination: Within functional limits (BUEs)     Balance:  Sitting: Intact  Standing: Impaired; With support  Standing - Static: Good  Standing - Dynamic : Fair     Strength:  Strength: Within functional limits (BUEs)     Tone & Sensation:  Tone: Normal (BUEs)  Sensation: Intact (BUEs)     Range of Motion:  AROM: Within functional limits (BUEs)     Functional Mobility and Transfers for ADLs:  Bed Mobility:  Supine to Sit: Additional time;Minimum assistance  Sit to Supine: Minimum assistance  Scooting: Moderate assistance  Transfers:  Sit to Stand: Minimum assistance;Assist x2              Stand to Sit: Minimum assistance              Toilet Transfer : Moderate assistance (simulated BSC transfer with RW)                ADL Assessment:  Feeding: Independent     Oral Facial Hygiene/Grooming: Moderate assistance (standing at sink with rw)     Bathing: Moderate assistance (seated at sink)     Upper Body Dressing: Modified independent (seated)     Lower Body Dressing: Moderate assistance     Toileting: Moderate assistance     Pain:  Pt reports 10/10 pain or discomfort prior to treatment.     Pt reports 10/10 pain or discomfort post treatment. Activity Tolerance:   Fair--Limited by pain     Please refer to the flowsheet for vital signs taken during this treatment. After treatment:   [ ] Patient left in no apparent distress sitting up in chair  [X] Patient left in no apparent distress in bed  [X] Call bell left within reach  [ ] Nursing notified  [ ] Caregiver present  [ ] Bed alarm activated      COMMUNICATION/EDUCATION:   [ ] Home safety education was provided and the patient/caregiver indicated understanding. [X] Patient/family have participated as able in goal setting and plan of care. [X] Patient/family agree to work toward stated goals and plan of care. [ ] Patient understands intent and goals of therapy, but is neutral about his/her participation. [ ] Patient is unable to participate in goal setting and plan of care.      Thank you for this referral.  DRE Crawford  Time Calculation: 19 mins

## 2017-07-27 NOTE — PROGRESS NOTES
Doing well some pain at upper thigh area but no rest pain. Unable to walk on leg with PT earlier but still fresh post op. Currently has motion and sensation of leg. See how well he does. Ideally if not in rest pain can get him maybe a few more months of leg use prior to ax-pop bypass or AKA. Patient is understanding will evaluate over weekend and see how he is doing.

## 2017-07-27 NOTE — PROGRESS NOTES
completed follow up visit with patient and offered Pastoral care, see flow sheets for interventions. Patient said he had to come back to the hospital. He talked about his [address] and that it is important to his life. He said he is continuing to hope for recovery and yet is at peace with his medical progress. Chaplains will continue to follow and will provide pastoral care  as needed or requested.      Valverde Terri, [address]  Board Certified [address] [place] [address] Road  Office [phone]

## 2017-07-27 NOTE — PROGRESS NOTES
OT orders received and patient's chart reviewed. Patient is currently on bedrest. Please update activity orders. Will follow up. Thank you.     Chastity Vu OTS

## 2017-07-27 NOTE — PROGRESS NOTES
Upon completion of PT and OT po prn pain medication given for c/o breakthrough pain. RLE dressings changed with scant amount clear drainage noted on old dressings.

## 2017-07-27 NOTE — PROGRESS NOTES
PT eval order received and chart reviewed. Pt has active bedrest orders conflicting with PT eval order. Please D/C bedrest to allow patient participation in therapy. Thank you for this referral. Branden Taylor, PT, DPT.

## 2017-07-28 LAB
ALBUMIN SERPL BCP-MCNC: 2 G/DL (ref 3.4–5)
ALBUMIN/GLOB SERPL: 0.4 {RATIO} (ref 0.8–1.7)
ALP SERPL-CCNC: 971 U/L (ref 45–117)
ALT SERPL-CCNC: 67 U/L (ref 16–61)
ANION GAP BLD CALC-SCNC: 8 MMOL/L (ref 3–18)
AST SERPL W P-5'-P-CCNC: 42 U/L (ref 15–37)
BACTERIA SPEC CULT: ABNORMAL
BASOPHILS # BLD AUTO: 0 K/UL (ref 0–0.1)
BASOPHILS # BLD: 0 % (ref 0–2)
BILIRUB SERPL-MCNC: 0.7 MG/DL (ref 0.2–1)
BUN SERPL-MCNC: 8 MG/DL (ref 7–18)
BUN/CREAT SERPL: 12 (ref 12–20)
CALCIUM SERPL-MCNC: 8.1 MG/DL (ref 8.5–10.1)
CHLORIDE SERPL-SCNC: 101 MMOL/L (ref 100–108)
CO2 SERPL-SCNC: 26 MMOL/L (ref 21–32)
CREAT SERPL-MCNC: 0.66 MG/DL (ref 0.6–1.3)
DIFFERENTIAL METHOD BLD: ABNORMAL
EOSINOPHIL # BLD: 0.2 K/UL (ref 0–0.4)
EOSINOPHIL NFR BLD: 2 % (ref 0–5)
ERYTHROCYTE [DISTWIDTH] IN BLOOD BY AUTOMATED COUNT: 16.8 % (ref 11.6–14.5)
GLOBULIN SER CALC-MCNC: 4.5 G/DL (ref 2–4)
GLUCOSE SERPL-MCNC: 96 MG/DL (ref 74–99)
GRAM STN SPEC: ABNORMAL
GRAM STN SPEC: ABNORMAL
HCT VFR BLD AUTO: 26.5 % (ref 36–48)
HGB BLD-MCNC: 9 G/DL (ref 13–16)
LYMPHOCYTES # BLD AUTO: 12 % (ref 21–52)
LYMPHOCYTES # BLD: 1.8 K/UL (ref 0.9–3.6)
MCH RBC QN AUTO: 28.8 PG (ref 24–34)
MCHC RBC AUTO-ENTMCNC: 34 G/DL (ref 31–37)
MCV RBC AUTO: 84.9 FL (ref 74–97)
MONOCYTES # BLD: 0.9 K/UL (ref 0.05–1.2)
MONOCYTES NFR BLD AUTO: 6 % (ref 3–10)
NEUTS SEG # BLD: 12.6 K/UL (ref 1.8–8)
NEUTS SEG NFR BLD AUTO: 80 % (ref 40–73)
PLATELET # BLD AUTO: 351 K/UL (ref 135–420)
PMV BLD AUTO: 10 FL (ref 9.2–11.8)
POTASSIUM SERPL-SCNC: 3.7 MMOL/L (ref 3.5–5.5)
PROT SERPL-MCNC: 6.5 G/DL (ref 6.4–8.2)
RBC # BLD AUTO: 3.12 M/UL (ref 4.7–5.5)
SERVICE CMNT-IMP: ABNORMAL
SODIUM SERPL-SCNC: 135 MMOL/L (ref 136–145)
WBC # BLD AUTO: 15.5 K/UL (ref 4.6–13.2)

## 2017-07-28 PROCEDURE — 85025 COMPLETE CBC W/AUTO DIFF WBC: CPT | Performed by: SURGERY

## 2017-07-28 PROCEDURE — 74011250637 HC RX REV CODE- 250/637: Performed by: PHYSICIAN ASSISTANT

## 2017-07-28 PROCEDURE — 77030019934 HC DRSG VAC ASST KCON -B

## 2017-07-28 PROCEDURE — 74011000258 HC RX REV CODE- 258: Performed by: PHYSICIAN ASSISTANT

## 2017-07-28 PROCEDURE — 74011000250 HC RX REV CODE- 250: Performed by: INTERNAL MEDICINE

## 2017-07-28 PROCEDURE — 74011250636 HC RX REV CODE- 250/636: Performed by: PHYSICIAN ASSISTANT

## 2017-07-28 PROCEDURE — 80053 COMPREHEN METABOLIC PANEL: CPT | Performed by: SURGERY

## 2017-07-28 PROCEDURE — 65660000004 HC RM CVT STEPDOWN

## 2017-07-28 PROCEDURE — 74011000250 HC RX REV CODE- 250: Performed by: PHYSICIAN ASSISTANT

## 2017-07-28 PROCEDURE — 74011250636 HC RX REV CODE- 250/636: Performed by: SURGERY

## 2017-07-28 PROCEDURE — 36415 COLL VENOUS BLD VENIPUNCTURE: CPT | Performed by: SURGERY

## 2017-07-28 PROCEDURE — 87040 BLOOD CULTURE FOR BACTERIA: CPT | Performed by: INTERNAL MEDICINE

## 2017-07-28 PROCEDURE — 74011250636 HC RX REV CODE- 250/636: Performed by: INTERNAL MEDICINE

## 2017-07-28 RX ADMIN — DILTIAZEM HYDROCHLORIDE 30 MG: 30 TABLET, FILM COATED ORAL at 08:31

## 2017-07-28 RX ADMIN — Medication 10 ML: at 05:27

## 2017-07-28 RX ADMIN — Medication 10 ML: at 14:00

## 2017-07-28 RX ADMIN — METOPROLOL TARTRATE 12.5 MG: 25 TABLET ORAL at 08:33

## 2017-07-28 RX ADMIN — VANCOMYCIN HYDROCHLORIDE 750 MG: 10 INJECTION, POWDER, LYOPHILIZED, FOR SOLUTION INTRAVENOUS at 03:58

## 2017-07-28 RX ADMIN — ASPIRIN 81 MG 81 MG: 81 TABLET ORAL at 08:30

## 2017-07-28 RX ADMIN — DOCUSATE SODIUM 100 MG: 100 CAPSULE, LIQUID FILLED ORAL at 08:32

## 2017-07-28 RX ADMIN — LOSARTAN POTASSIUM 25 MG: 25 TABLET ORAL at 08:32

## 2017-07-28 RX ADMIN — GABAPENTIN 100 MG: 100 CAPSULE ORAL at 17:49

## 2017-07-28 RX ADMIN — ENOXAPARIN SODIUM 40 MG: 40 INJECTION SUBCUTANEOUS at 16:55

## 2017-07-28 RX ADMIN — DULOXETINE HYDROCHLORIDE 60 MG: 60 CAPSULE, DELAYED RELEASE ORAL at 08:33

## 2017-07-28 RX ADMIN — CYANOCOBALAMIN TAB 1000 MCG 1000 MCG: 1000 TAB at 08:32

## 2017-07-28 RX ADMIN — ATORVASTATIN CALCIUM 40 MG: 40 TABLET, FILM COATED ORAL at 21:31

## 2017-07-28 RX ADMIN — Medication 10 ML: at 22:00

## 2017-07-28 RX ADMIN — OXYCODONE AND ACETAMINOPHEN 2 TABLET: 5; 325 TABLET ORAL at 12:18

## 2017-07-28 RX ADMIN — NAFCILLIN SODIUM 2 G: 2 INJECTION, POWDER, LYOPHILIZED, FOR SOLUTION INTRAMUSCULAR; INTRAVENOUS at 13:07

## 2017-07-28 RX ADMIN — Medication 325 MG: at 08:33

## 2017-07-28 RX ADMIN — PIPERACILLIN SODIUM,TAZOBACTAM SODIUM 3.38 G: 3; .375 INJECTION, POWDER, FOR SOLUTION INTRAVENOUS at 05:27

## 2017-07-28 RX ADMIN — DILTIAZEM HYDROCHLORIDE 30 MG: 30 TABLET, FILM COATED ORAL at 17:49

## 2017-07-28 RX ADMIN — VITAMIN D, TAB 1000IU (100/BT) 1000 UNITS: 25 TAB at 08:33

## 2017-07-28 RX ADMIN — GABAPENTIN 100 MG: 100 CAPSULE ORAL at 21:31

## 2017-07-28 RX ADMIN — AMIODARONE HYDROCHLORIDE 200 MG: 200 TABLET ORAL at 08:32

## 2017-07-28 RX ADMIN — METOPROLOL TARTRATE 12.5 MG: 25 TABLET ORAL at 21:31

## 2017-07-28 RX ADMIN — GABAPENTIN 100 MG: 100 CAPSULE ORAL at 08:33

## 2017-07-28 RX ADMIN — Medication 250 MG: at 08:30

## 2017-07-28 RX ADMIN — MORPHINE SULFATE: 5 INJECTION, SOLUTION INTRAVENOUS at 19:36

## 2017-07-28 RX ADMIN — DILTIAZEM HYDROCHLORIDE 30 MG: 30 TABLET, FILM COATED ORAL at 12:19

## 2017-07-28 RX ADMIN — DILTIAZEM HYDROCHLORIDE 30 MG: 30 TABLET, FILM COATED ORAL at 21:30

## 2017-07-28 RX ADMIN — NAFCILLIN 12 G: 10 INJECTION, POWDER, FOR SOLUTION INTRAVENOUS at 18:49

## 2017-07-28 NOTE — PROGRESS NOTES
[address] [place] [address]  ANTIMICROBIAL STEWARDSHIP TEAM  PRESCRIBER COMMUNICATION FORM      We have briefly reviewed the antimicrobials  currently prescribed for your patient along with  the clinical indications and would like to Make the following recommendations:    DISCONTINUE ANTIBIOTICS   Pip/tazo, vancomycin      Rationale:    (  )  No evidence of bacterial infection    (  )  Microbiology report does not support use    (  )  Narrowing broad-spectrum empiric coverage    (  )  Therapeutic duplication: overlapping antimicrobial spectrum    (  )  Clinical situation dictates change    (  )  Prolonged duration of therapy not needed    (  )  Allergy/toxicity/drug interaction present    (  ) More clinically/cost effective therapy available  ADD ANTIBIOTICS                  nafcillin  Rationale:        (x) Microbiology report supports use    (  ) Expanded coverage needed: gm positive /negative / anaerobic /                               atypical    ( ) More clinicaly/cost effective therapy than current regimen    ( ) Needed for synergy    ( ) Double coverage needed    ( ) Less toxic therapy    ( ) Antifungal therapy needed  ADDITIONAL SUGGESTIONS    ( ) continue current therapy with the following change    (x) additional laboratory testing    (x) consider infectious disease consultation    ( ) consider outpatient IV therapy    ( ) other    COMMENTS: repeat blood cultures to determine clearance of bacteremia, recommend ID consult. This communication is not to be considered a consultation. You are under no obligation to follow these suggestions. A physician-patient relationship has not been established between the physician Completing this form and your patient. If a thorough analysis of the case is desired, please request an ID consultation.

## 2017-07-28 NOTE — CONSULTS
Infectious Disease Consultation Note    Requested by: Dr Elliot Gates    Reason:     Current abx Prior abx   Pip/tazo, vancomycin 7/24-7/28  Nafcillin sinec 7/28      Lines:       Assessment :    Susana Webster is a 59 y.o. male with CAD, severe peripheral arterial insufficiency admitted to [facility] on 7/24/17 with gram positive bacteremia, exposed left groin vascular graft. Clinical picture c/w sepsis (POA) due to MSSA bloodstream infection (positive blood cultures 7/22, 7/24). Most likely source of MSSA bacteremia is infected left groin vascular graft. Patient appropriately underwent removal of infected graft, Repair of superficial femoral artery, left side; Repair of common femoral artery on 7/25/17. Intra op cx: MSSA. Currently he is clinically stable. Will need to follow up repeat blood cultures and monitor clinically to determine need for further work up/intervention. Recommendations:    1. D/c pip/tazo, vancomycin. Start nafcillin continuous infusion  2. Repeat blood cultures  3. F/u vascular surgery recommendations      Thank you for consultation request. Above plan was discussed in details with patient. Will d/w dr Elliot Gates. Please call me if any further questions or concerns. Will continue to participate in the care of this patient. HPI:    Susana Webster is a 59 y.o. male with CAD, severe peripheral arterial insufficiency admitted to [facility] on 7/24/17. Looking back at his history, he has history of ax-fem bypass with jump fem-fem bypass and right fem-pop bypass. Recently he developed abscess over his ax-fem bypass requiring I&D and repair of left CFA pseudoaneurysm. He unfortunately had blow out of his left femoral anastomosis and had left CFA ligation with jump bypass from right to left fem-fem bypass. He was admitted to [facility] on 7/22/17 for septic shock. CTA scan was negative for PE. ECHO showed severe right heart dilatation with reduced right ventricular global systolic function. EF 50%. He was placed on pressors and diuresed with good improvement. His WBCs were noted to be elevated at 27.7K and he was started on IV ABX. Reportedly blood cultures were positive however I am unable to review these results on connect care. He was noted to have exposed graft in the left groin with purulent drainage. He was transferred to [facility] for further treatment per his vascular surgeon.    He underwent Removal of infected graft, Repair of superficial femoral artery, left side; Repair of common femoral artery on 7/25/17. His blood cultures 7/24 and intra op cultures reveal MSSA. I have been consulted for further recommendations. Patient complains of some pain at the recent surgical site left groin. Patient denies headaches, visual disturbances, sore throat, runny nose, earaches, cp, sob, chills, cough, abdominal pain, diarrhea, burning micturition,  or weakness in extremities. He denies back pain/flank pain. He denies recent sick contacts. No h/o recent travel. No known h/o MRSA colonization or infection in the past.            Past Medical History:   Diagnosis Date    Acute blood loss as cause of postoperative anemia 4/4/2017    Anticoagulated on Coumadin     Aortoiliac occlusive disease ([address] 1/25/2017    Atherosclerosis of native artery of both lower extremities with intermittent claudication ([address] 1/25/2017    Benign hypertensive heart disease with systolic congestive heart failure, NYHA class 2 ([address] 1/26/2015    Carotid artery disease (HCC)     Chronic atrial fibrillation (HCC)     Chronic systolic heart failure (HCC)     Chronic ulcer of right foot ([address] 4/4/2017    Coronary artery disease involving native coronary artery of native heart 3/15/2017    Successful stenting of Cx (Xience LESLEY) and RCA (Xience LESLEY) to 0% by Dr. Catherine Nye on 3/15/17.     DDD (degenerative disc disease), lumbar 1/26/2015    Dyslipidemia     Erectile dysfunction 7/5/2016    Euthyroid sick syndrome 4/6/2017    Hereditary peripheral neuropathy 11/15/2016    History of cardioversion 4/18/2017    S/P Synchronized external cardioversion (4/18/2017 - Dr. Diamond Mejia)    Hypertension     Hyperuricemia     Ischemic cardiomyopathy     Peripheral artery disease ([facility] 1/25/2017    Spinal stenosis of lumbar region with radiculopathy 5/4/2015    Dr. Lexus Lindsey Vitamin D deficiency 4/22/2017    Vitamin D 25-Hydroxy (4/22/2017) = 12.0       Past Surgical History:   Procedure Laterality Date    CARDIAC CATHETERIZATION  3/8/2017         CARDIAC CATHETERIZATION  3/15/2017         CORONARY STENT SINGLE W/PTCA  3/15/2017         HX COLONOSCOPY  07/23/2015    polyps repeat 2020 5 years Larry Henderson    HX FEMORAL BYPASS Right 04/04/2017    S/P Right axillary to bifemoral artery bypass using an 8 mm Propaten graft from the right axilla to the right common femoral artery with a jump femoral-femoral bypass with another 8 mm Propaten external ring bypass; Right common femoral artery, and profunda femoris artery and superficial femoral artery endarterectomy causing an extensive surgery on the right side (4/4/2017 - Dr. Perla Mason)    [address] Right 04/07/2017    S/P Cutdown of femoral-femoral bypass, more on the left groin side; Right lower extremity angiography with first-order catheterization (4/7/2017 - Dr. Jazzy Love)    HX FEMORAL BYPASS Right 04/10/2017    S/P Right femoral to above-knee popliteal bypass with an 8 mm PTFE graft (4/10/2017 - Dr. Frankie Goss)       Home Medication List    Details   amiodarone (CORDARONE) 200 mg tablet Take 1 Tab by mouth daily. Indications: VENTRICULAR RATE CONTROL IN ATRIAL FIBRILLATION  Qty: 60 Tab, Refills: 0      clopidogrel (PLAVIX) 75 mg tab Take 1 Tab by mouth daily. Qty: 90 Tab, Refills: 3      oxyCODONE-acetaminophen (PERCOCET) 5-325 mg per tablet Take 2 Tabs by mouth every four (4) hours as needed. Max Daily Amount: 12 Tabs.   Qty: 60 Tab, Refills: 0 gabapentin (NEURONTIN) 100 mg capsule Take 1 Cap by mouth two (2) times a day. Indications: NEUROPATHIC PAIN  Qty: 60 Cap, Refills: 9      aspirin 81 mg chewable tablet Take 1 Tab by mouth daily (with breakfast). Qty: 90 Tab, Refills: 3      cholecalciferol (VITAMIN D3) 1,000 unit tablet Take 1 Tab by mouth daily. Qty: 90 Tab, Refills: 3      losartan (COZAAR) 25 mg tablet Take 1 Tab by mouth daily. Indications: Chronic Heart Failure, hypertension  Qty: 90 Tab, Refills: 2      ascorbic acid, vitamin C, (VITAMIN C) 250 mg tablet Take 1 Tab by mouth daily (with breakfast). Qty: 90 Tab, Refills: 2      DULoxetine (CYMBALTA) 60 mg capsule Take 1 Cap by mouth daily. Qty: 90 Cap, Refills: 2      zinc sulfate (ZINCATE) 220 (50) mg capsule Take 1 Cap by mouth daily. Qty: 90 Cap, Refills: 0      ferrous sulfate 325 mg (65 mg iron) tablet Take 1 Tab by mouth daily (with breakfast). Qty: 90 Tab, Refills: 3      atorvastatin (LIPITOR) 40 mg tablet Take 1 Tab by mouth nightly. Indications: DYSLIPIDEMIA  Qty: 90 Tab, Refills: 3      metoprolol tartrate (LOPRESSOR) 25 mg tablet Take 0.5 Tabs by mouth every twelve (12) hours. Indications: hypertension, VENTRICULAR RATE CONTROL IN ATRIAL FIBRILLATION  Qty: 30 Tab, Refills: 6      dilTIAZem (CARDIZEM) 30 mg tablet Take 1 Tab by mouth Before breakfast, lunch, dinner and at bedtime. Indications: hypertension  Qty: 120 Tab, Refills: 6      cyanocobalamin (VITAMIN B-12) 1,000 mcg tablet Take 1 Tab by mouth daily.   Qty: 90 Tab, Refills: 3             Current Facility-Administered Medications   Medication Dose Route Frequency    nafcillin (NALLPEN) 2 g in 0.9% sodium chloride (MBP/ADV) 100 mL MBP  2 g IntraVENous Q4H    0.9% sodium chloride infusion 250 mL  250 mL IntraVENous PRN    sodium chloride (NS) flush 5-10 mL  5-10 mL IntraVENous Q8H    sodium chloride (NS) flush 5-10 mL  5-10 mL IntraVENous PRN    morphine (PF)  mg/30 ml   IntraVENous TITRATE    naloxone [facility]) injection 0.1 mg  0.1 mg IntraVENous PRN    atorvastatin (LIPITOR) tablet 40 mg  40 mg Oral QHS    oxyCODONE-acetaminophen (PERCOCET) 5-325 mg per tablet 1-2 Tab  1-2 Tab Oral Q4H PRN    acetaminophen (TYLENOL) tablet 650 mg  650 mg Oral Q4H PRN    HYDROmorphone (PF) (DILAUDID) injection 1 mg  1 mg IntraVENous Q3H PRN    diphenhydrAMINE (BENADRYL) injection 12.5 mg  12.5 mg IntraVENous Q4H PRN    ondansetron (ZOFRAN) injection 4 mg  4 mg IntraVENous Q4H PRN    docusate sodium (COLACE) capsule 100 mg  100 mg Oral DAILY    enoxaparin (LOVENOX) injection 40 mg  40 mg SubCUTAneous Q24H    amiodarone (CORDARONE) tablet 200 mg  200 mg Oral DAILY    ascorbic acid (vitamin C) (VITAMIN C) tablet 250 mg  250 mg Oral DAILY    aspirin chewable tablet 81 mg  81 mg Oral DAILY    cholecalciferol (VITAMIN D3) tablet 1,000 Units  1,000 Units Oral DAILY    cyanocobalamin tablet 1,000 mcg  1,000 mcg Oral DAILY    dilTIAZem (CARDIZEM) IR tablet 30 mg  30 mg Oral QID    DULoxetine (CYMBALTA) capsule 60 mg  60 mg Oral DAILY    ferrous sulfate tablet 325 mg  1 Tab Oral DAILY WITH BREAKFAST    gabapentin (NEURONTIN) capsule 100 mg  100 mg Oral TID    metoprolol tartrate (LOPRESSOR) tablet 12.5 mg  12.5 mg Oral Q12H    losartan (COZAAR) tablet 25 mg  25 mg Oral DAILY       Allergies: Review of patient's allergies indicates no known allergies. Family History   Problem Relation Age of Onset    Heart Disease Father      Social History     Social History    Marital status: LEGALLY      Spouse name: N/A    Number of children: N/A    Years of education: N/A     Occupational History    Not on file.      Social History Main Topics    Smoking status: Former Smoker    Smokeless tobacco: Never Used      Comment: quit in 2011    Alcohol use 1.2 oz/week     2 Cans of beer per week      Comment: 10 cans of beer a month    Drug use: Yes     Special: Marijuana      Comment: occasionally    Sexual activity: Yes     Partners: Female     Other Topics Concern    Not on file     Social History Narrative     History   Smoking Status    Former Smoker   Smokeless Tobacco    Never Used     Comment: quit in         Temp (24hrs), Av.8 °F (36.6 °C), Min:97.4 °F (36.3 °C), Max:98 °F (36.7 °C)    Visit Vitals    /83    Pulse 67    Temp 97.9 °F (36.6 °C)    Resp 20    Ht 5' 10\" (1.778 m)    Wt 78.4 kg (172 lb 12.8 oz)    SpO2 98%    BMI 24.79 kg/m2       ROS: 12 point ROS obtained in details. Pertinent positives as mentioned in HPI,   otherwise negative    Physical Exam:    General: Well developed, well nourished male laying on the bed AAOx3 in no acute distress. General:   awake alert and oriented   HEENT:  Normocephalic, atraumatic, PERRL, EOMI, no scleral icterus or pallor; no conjunctival hemmohage;  nasal and oral mucous are moist and without evidence of lesions. No thrush. Neck supple, no bruits. Lymph Nodes:   no cervical, axillary or inguinal adenopathy   Lungs:   non-labored, bilaterally clear to auscultation- no crackles wheezes rales or rhonchi   Heart:  RRR, s1 and s2; no  rubs or gallops, no edema, + pedal pulses   Abdomen:  soft, non-distended, active bowel sounds, no hepatomegaly, no splenomegaly. Non-tender   Genitourinary:  deferred   Extremities:   no clubbing, cyanosis; no joint effusions or swelling; wound vac left groin; muscle mass appropriate for age   Neurologic:  No gross focal sensory abnormalities; 5/5 muscle strength to upper and lower extremities. Speech appropriate.  Cranial nerves intact                        Skin:  Surgical changes left groin   Back:  no spinal or paraspinal muscle tenderness or rigidity, no CVA tenderness     Psychiatric:  No suicidal or homicidal ideations, appropriate mood and affect         Labs: Results:   Chemistry Recent Labs      17   0510  17   0510  17   0057   GLU  96  95  95   NA  135*  133*  134*   K  3.7  3.8  3.8   CL 101  102  103   CO2  26  24  23   BUN  8  9  15   CREA  0.66  0.79  0.73   CA  8.1*  8.2*  8.0*   AGAP  8  7  8   BUCR  12  11*  21*   AP  971*  768*  516*   TP  6.5  6.3*  6.1*   ALB  2.0*  2.0*  2.0*   GLOB  4.5*  4.3*  4.1*   AGRAT  0.4*  0.5*  0.5*      CBC w/Diff Recent Labs      07/28/17   0510  07/27/17   0510  07/26/17   1048  07/26/17   0057   WBC  15.5*  17.9*   --   19.5*   RBC  3.12*  3.07*   --   2.59*   HGB  9.0*  8.7*  8.8*  7.4*   HCT  26.5*  25.8*  26.0*  21.6*   PLT  351  320   --   300   GRANS  80*  82*   --   74   LYMPH  12*  11*   --   6*   EOS  2  1   --   0      Microbiology Recent Labs      07/25/17   1818   CULT  FEW STAPHYLOCOCCUS AUREUS*  FEW STAPHYLOCOCCUS AUREUS 2ND MORPHOTYPE*  FEW DIPHTHEROIDS*  NO ANAEROBES ISOLATED 3 DAYS          RADIOLOGY:    All available imaging studies/reports in [facility] care for this admission were reviewed    Dr. Chastity Shelton, Infectious Disease Specialist  [phone]  July 28, 2017  4:21 PM

## 2017-07-28 NOTE — PROGRESS NOTES
Saw the patient in room alone, he confirmed he has at home wound vac that was arranged in prior admission. Patient says, he will be going to his daughter's house. Patient confirms he was active with [facility] PTA and he would like to continue with [facility] when he leaves the hospital; patient signed [address] form, original placed to paper chart; cm confirmed with [facility] liaison, patient is still active to our home health services. CM received call from pt's daughter-Lynnette [phone], she confirmed her father will be coming to stay with her when dc, at the address: [address] [place] [address], [facility]. She also requested if her father can go to ARU at [facility] prior to coming back home. CM made referral to Ana Luisa Tejeda, she will follow the patient for possible transfer to ARU.

## 2017-07-28 NOTE — PROGRESS NOTES
As per assessment by Dr Kehinde Cruz yesterday evening, doing well but with some pain at upper thigh area but no rest pain. Unable to walk on leg with PT earlier but still fresh post op. Currently has motion and sensation of leg. See how well he does. Ideally if not in rest pain can get him maybe a few more months of leg use prior to ax-pop bypass or AKA. Patient is understanding will evaluate over weekend and see how he is doing.  Therefore, if need to consider surgery early next week will still hold plans for coumadin

## 2017-07-29 LAB
ALBUMIN SERPL BCP-MCNC: 1.8 G/DL (ref 3.4–5)
ALBUMIN/GLOB SERPL: 0.4 {RATIO} (ref 0.8–1.7)
ALP SERPL-CCNC: 943 U/L (ref 45–117)
ALT SERPL-CCNC: 54 U/L (ref 16–61)
ANION GAP BLD CALC-SCNC: 9 MMOL/L (ref 3–18)
AST SERPL W P-5'-P-CCNC: 35 U/L (ref 15–37)
BASOPHILS # BLD AUTO: 0 K/UL (ref 0–0.06)
BASOPHILS # BLD: 0 % (ref 0–3)
BILIRUB SERPL-MCNC: 1.3 MG/DL (ref 0.2–1)
BUN SERPL-MCNC: 8 MG/DL (ref 7–18)
BUN/CREAT SERPL: 13 (ref 12–20)
CALCIUM SERPL-MCNC: 8.5 MG/DL (ref 8.5–10.1)
CHLORIDE SERPL-SCNC: 100 MMOL/L (ref 100–108)
CO2 SERPL-SCNC: 23 MMOL/L (ref 21–32)
CREAT SERPL-MCNC: 0.64 MG/DL (ref 0.6–1.3)
DIFFERENTIAL METHOD BLD: ABNORMAL
EOSINOPHIL # BLD: 0.5 K/UL (ref 0–0.4)
EOSINOPHIL NFR BLD: 3 % (ref 0–5)
ERYTHROCYTE [DISTWIDTH] IN BLOOD BY AUTOMATED COUNT: 16.7 % (ref 11.6–14.5)
GLOBULIN SER CALC-MCNC: 4.4 G/DL (ref 2–4)
GLUCOSE SERPL-MCNC: 96 MG/DL (ref 74–99)
HCT VFR BLD AUTO: 30.4 % (ref 36–48)
HGB BLD-MCNC: 10.3 G/DL (ref 13–16)
LYMPHOCYTES # BLD AUTO: 15 % (ref 20–51)
LYMPHOCYTES # BLD: 2.4 K/UL (ref 0.8–3.5)
MCH RBC QN AUTO: 28.5 PG (ref 24–34)
MCHC RBC AUTO-ENTMCNC: 33.9 G/DL (ref 31–37)
MCV RBC AUTO: 84.2 FL (ref 74–97)
MONOCYTES # BLD: 1 K/UL (ref 0–1)
MONOCYTES NFR BLD AUTO: 6 % (ref 2–9)
NEUTS BAND NFR BLD MANUAL: 1 % (ref 0–5)
NEUTS SEG # BLD: 12.4 K/UL (ref 1.8–8)
NEUTS SEG NFR BLD AUTO: 75 % (ref 42–75)
PLATELET # BLD AUTO: 383 K/UL (ref 135–420)
PLATELET COMMENTS,PCOM: ABNORMAL
PMV BLD AUTO: 9.8 FL (ref 9.2–11.8)
POTASSIUM SERPL-SCNC: 3.6 MMOL/L (ref 3.5–5.5)
PROT SERPL-MCNC: 6.2 G/DL (ref 6.4–8.2)
RBC # BLD AUTO: 3.61 M/UL (ref 4.7–5.5)
RBC MORPH BLD: ABNORMAL
SODIUM SERPL-SCNC: 132 MMOL/L (ref 136–145)
WBC # BLD AUTO: 16.3 K/UL (ref 4.6–13.2)

## 2017-07-29 PROCEDURE — 74011250637 HC RX REV CODE- 250/637: Performed by: PHYSICIAN ASSISTANT

## 2017-07-29 PROCEDURE — 36415 COLL VENOUS BLD VENIPUNCTURE: CPT | Performed by: SURGERY

## 2017-07-29 PROCEDURE — 80053 COMPREHEN METABOLIC PANEL: CPT | Performed by: SURGERY

## 2017-07-29 PROCEDURE — 74011250636 HC RX REV CODE- 250/636: Performed by: INTERNAL MEDICINE

## 2017-07-29 PROCEDURE — 85025 COMPLETE CBC W/AUTO DIFF WBC: CPT | Performed by: SURGERY

## 2017-07-29 PROCEDURE — 74011250636 HC RX REV CODE- 250/636: Performed by: SURGERY

## 2017-07-29 PROCEDURE — 97530 THERAPEUTIC ACTIVITIES: CPT

## 2017-07-29 PROCEDURE — 74011250636 HC RX REV CODE- 250/636: Performed by: PHYSICIAN ASSISTANT

## 2017-07-29 PROCEDURE — 97116 GAIT TRAINING THERAPY: CPT

## 2017-07-29 PROCEDURE — 97535 SELF CARE MNGMENT TRAINING: CPT

## 2017-07-29 PROCEDURE — 65660000004 HC RM CVT STEPDOWN

## 2017-07-29 PROCEDURE — 74011000250 HC RX REV CODE- 250: Performed by: INTERNAL MEDICINE

## 2017-07-29 RX ADMIN — GABAPENTIN 100 MG: 100 CAPSULE ORAL at 08:04

## 2017-07-29 RX ADMIN — Medication 10 ML: at 06:18

## 2017-07-29 RX ADMIN — DILTIAZEM HYDROCHLORIDE 30 MG: 30 TABLET, FILM COATED ORAL at 13:25

## 2017-07-29 RX ADMIN — DOCUSATE SODIUM 100 MG: 100 CAPSULE, LIQUID FILLED ORAL at 08:05

## 2017-07-29 RX ADMIN — HYDROMORPHONE HYDROCHLORIDE 1 MG: 1 INJECTION, SOLUTION INTRAMUSCULAR; INTRAVENOUS; SUBCUTANEOUS at 21:29

## 2017-07-29 RX ADMIN — SODIUM CHLORIDE 250 ML: 900 INJECTION, SOLUTION INTRAVENOUS at 00:32

## 2017-07-29 RX ADMIN — GABAPENTIN 100 MG: 100 CAPSULE ORAL at 21:28

## 2017-07-29 RX ADMIN — LOSARTAN POTASSIUM 25 MG: 25 TABLET ORAL at 08:04

## 2017-07-29 RX ADMIN — DILTIAZEM HYDROCHLORIDE 30 MG: 30 TABLET, FILM COATED ORAL at 17:24

## 2017-07-29 RX ADMIN — NAFCILLIN 12 G: 10 INJECTION, POWDER, FOR SOLUTION INTRAVENOUS at 21:30

## 2017-07-29 RX ADMIN — METOPROLOL TARTRATE 12.5 MG: 25 TABLET ORAL at 21:28

## 2017-07-29 RX ADMIN — DILTIAZEM HYDROCHLORIDE 30 MG: 30 TABLET, FILM COATED ORAL at 21:28

## 2017-07-29 RX ADMIN — ATORVASTATIN CALCIUM 40 MG: 40 TABLET, FILM COATED ORAL at 21:28

## 2017-07-29 RX ADMIN — Medication 10 ML: at 21:41

## 2017-07-29 RX ADMIN — VITAMIN D, TAB 1000IU (100/BT) 1000 UNITS: 25 TAB at 08:04

## 2017-07-29 RX ADMIN — GABAPENTIN 100 MG: 100 CAPSULE ORAL at 17:24

## 2017-07-29 RX ADMIN — METOPROLOL TARTRATE 12.5 MG: 25 TABLET ORAL at 08:04

## 2017-07-29 RX ADMIN — Medication 10 ML: at 13:25

## 2017-07-29 RX ADMIN — CYANOCOBALAMIN TAB 1000 MCG 1000 MCG: 1000 TAB at 08:05

## 2017-07-29 RX ADMIN — DILTIAZEM HYDROCHLORIDE 30 MG: 30 TABLET, FILM COATED ORAL at 08:04

## 2017-07-29 RX ADMIN — ASPIRIN 81 MG 81 MG: 81 TABLET ORAL at 08:04

## 2017-07-29 RX ADMIN — Medication 325 MG: at 08:05

## 2017-07-29 RX ADMIN — Medication 250 MG: at 08:05

## 2017-07-29 RX ADMIN — HYDROMORPHONE HYDROCHLORIDE 1 MG: 1 INJECTION, SOLUTION INTRAMUSCULAR; INTRAVENOUS; SUBCUTANEOUS at 17:30

## 2017-07-29 RX ADMIN — DULOXETINE HYDROCHLORIDE 60 MG: 60 CAPSULE, DELAYED RELEASE ORAL at 08:05

## 2017-07-29 RX ADMIN — ENOXAPARIN SODIUM 40 MG: 40 INJECTION SUBCUTANEOUS at 17:23

## 2017-07-29 RX ADMIN — AMIODARONE HYDROCHLORIDE 200 MG: 200 TABLET ORAL at 08:04

## 2017-07-29 NOTE — PROGRESS NOTES
Problem: Mobility Impaired (Adult and Pediatric)  Goal: *Acute Goals and Plan of Care (Insert Text)  Physical Therapy Goals  Initiated 7/27/2017 and to be accomplished within 7 day(s)  1. Patient will move from supine to sit and sit to supine , scoot up and down and roll side to side in bed with supervision/set-up. 2. Patient will transfer from bed to chair and chair to bed with supervision/set-up using the least restrictive device. 3. Patient will perform sit to stand with supervision/set-up. 4. Patient will ambulate with supervision/set-up for >100 feet with the least restrictive device. 5. Patient will ascend/descend 4 stairs with 1 handrail(s) with supervision/set-up. Outcome: Progressing Towards Goal  PHYSICAL THERAPY TREATMENT     Patient: Jase Garcia (55 y.o. male)  Date: 7/29/2017  Diagnosis: Severe sepsis with septic shock  Sepsis ([facility]  DX <principal problem not specified>  Procedure(s) (LRB):  REMOVAL OF LEFT LEG INFECTED GRAFT (Left) 4 Days Post-Op  Precautions: Fall, WBAT   Chart, physical therapy assessment, plan of care and goals were reviewed. ASSESSMENT:  Pt found supine in bed willing to work with PT. Pt seen with OT to maximize safety of pt and staff. Pt sat EOB once his many lines were untangled. Pt requires decreased assistance and is able to ambulate this tx. Pt used RW with SBA / CG, reaching 20 feet. Pt returned to EOB and reviewed reacher and sock aid with [place] before returning supine and left with needs in reach. Pt progressing slowly due to pain. Pt reports 6/10 pain at rest which rises to 8/10 post tx. Pt is very pleasant and motivated to make progress. Education: transfers, gait.   Progression toward goals:  [ ]      Improving appropriately and progressing toward goals  [X]      Improving slowly and progressing toward goals  [ ]      Not making progress toward goals and plan of care will be adjusted       PLAN:  Patient continues to benefit from skilled intervention to address the above impairments. Continue treatment per established plan of care. Discharge Recommendations:  Rehab vs HHPT  Further Equipment Recommendations for Discharge:  rolling walker       SUBJECTIVE:   Patient stated I think that's all I better do.       OBJECTIVE DATA SUMMARY:   Critical Behavior:  Neurologic State: Alert  Orientation Level: Oriented X4  Cognition: Appropriate decision making, Appropriate for age attention/concentration, Appropriate safety awareness, Follows commands  Functional Mobility Training:  Bed Mobility:  Sit to Supine: Stand-by asssistance  Scooting: Stand-by asssistance  Transfers:  Sit to Stand: Contact guard assistance  Balance:  Sitting: Intact  Standing: Impaired; With support  Standing - Static: Good  Standing - Dynamic : Fair  Ambulation/Gait Training:  Distance (ft): 20 Feet (ft)  Assistive Device: Walker, rolling  Ambulation - Level of Assistance: Contact guard assistance;Stand-by asssistance  Gait Abnormalities: Antalgic;Decreased step clearance  Left Side Weight Bearing: As tolerated  Base of Support: Shift to right  Stance: Left decreased  Speed/Suzanna: Slow;Pace decreased (<100 feet/min)  Step Length: Left shortened;Right shortened  Swing Pattern: Left asymmetrical;Right asymmetrical  Interventions: Verbal cues     Pain:  Pre tx pain: 6  Post tx pain: 8  Pain Scale 1: Numeric (0 - 10)  Pain Intensity 1: 8  Pain Intervention(s) 1: Rest  Activity Tolerance:   Fair  Please refer to the flowsheet for vital signs taken during this treatment. After treatment:   [ ] Patient left in no apparent distress sitting up in chair  [X] Patient left in no apparent distress in bed  [X] Call bell left within reach  [ ] Nursing notified  [ ] Caregiver present  [ ] Bed alarm activated      Lorri Lynne PTA   Time Calculation: 32 mins     Mobility [id number] Current  CI= 1-19%. The severity rating is based on the Level of Assistance required for Functional Mobility and ADLs.      Mobility  Goal  CI= 1-19%. The severity rating is based on the Level of Assistance required for Functional Mobility and ADLs.

## 2017-07-29 NOTE — PROGRESS NOTES
No change in condition or assessment from past few days. Pt in good spirits and no complaints at this time  As per previous assessment by Dr Timmy Hunter, doing as well as can be expected, but no rest pain.  See how well he does. Marilu Real if not in rest pain can get him maybe a few more months of leg use prior to ax-pop bypass or Nikita Lees is understanding Dr Timmy Hunter will evaluate tomorrow and see how he is doing.  Therefore, if need to consider surgery early next week will still hold plans for coumadin

## 2017-07-29 NOTE — ROUTINE PROCESS
Bedside and Verbal shift change report given to alexa flores rn (oncoming nurse) by Earlene Melgar RN (offgoing nurse). Report included the following information SBAR, OR Summary, Intake/Output, MAR, Recent Results and Cardiac Rhythm sr.

## 2017-07-29 NOTE — PROGRESS NOTES
1330 Dr Lord Redd into see pt. States well known to him pulses lt foot may not be found and temp to be expected.

## 2017-07-29 NOTE — PROGRESS NOTES
1218 pt complaint of pain lt leg sitting in chair. Placed back in bed.pain #10 meds given. Noted lower portion of lt leg cool to touch this morning only cool @ knee. Unable to find dp pulse by doppler. Ximena Prince 82. [place] called is in or will see pt after per or nurse.

## 2017-07-29 NOTE — ROUTINE PROCESS
Awake on walking rounds voicing no complaints at this time. Wound vac intact draining sersangineous secretions. PCA  Providing adequate pain relief. SR up x 4.  CB/telelphone at hand,.     2200  No change in status. Asleep in no apparent distress. SR up x 4. CB/telephone at hand. 0000 asleep in no apparent distress with chest rising and falling without distress noted. SR up x 4. CB/telephone at hand    0330  No change in status. SR up x 4. CB/telephone at hand.

## 2017-07-29 NOTE — ROUTINE PROCESS
Bedside and Verbal shift change report given to BEAU Osullivan (oncoming nurse) by Mayur Quiñones RN (offgoing nurse). Report included the following information SBAR, Kardex, Intake/Output and MAR.

## 2017-07-29 NOTE — PROGRESS NOTES
Problem: Self Care Deficits Care Plan (Adult)  Goal: *Acute Goals and Plan of Care (Insert Text)  Occupational Therapy Goals  Initiated 7/27/2017 within 7 day(s). 1. Patient will perform LE bathing with supervision using AE   2. Patient will perform toilet transfers/toileting with contact guard assist.  3. Patient will perform lower body dressing with supervision using AE   Outcome: Progressing Towards Goal  OCCUPATIONAL THERAPY TREATMENT     Patient: Mari Mata (58 y.o. male)  Date: 7/29/2017  Diagnosis: Severe sepsis with septic shock  Sepsis ([facility] [address]  DX <principal problem not specified>  Procedure(s) (LRB):  REMOVAL OF LEFT LEG INFECTED GRAFT (Left) 4 Days Post-Op  Precautions: Fall, WBAT  Chart, occupational therapy assessment, plan of care, and goals were reviewed. ASSESSMENT:  Pt is very pleasant and cooperative, agreeable to participate in ADL training. Pt seen w/PT to maximize safety of the pt and staff. Pt was educated on AE for LB dressing, pt is very receptive to education, and reports he is willing to use it to increase independence for ADLs (reacher and sock aid issued to the pt). Pt participated in LB ADL training seated EOB requiring initially Min A, which improved to CGA for donning socks w/AE. Pt is mildly SOB upon completing LB dressing. Pt required VCs for proper breathing technique, following which SOB resolves within ~30 sec. Pt also participated in simulated txfr to the toilet requiring CGA for sit->std and for IV cords, O2 cannula, wound vac management. Pt reports he wishes to return to bed at this time. Pt noted to have a small bleeding under the gown in L lower abdominal area, pt's nurse notified.   Progression toward goals:  [X]          Improving appropriately and progressing toward goals  [ ]          Improving slowly and progressing toward goals  [ ]          Not making progress toward goals and plan of care will be adjusted       PLAN:  Patient continues to benefit from skilled intervention to address the above impairments. Continue treatment per established plan of care. Discharge Recommendations:  Rehab  Further Equipment Recommendations for Discharge:  N/A      G-CODES:      Self Care  Current  CJ= 20-39%. The severity rating is based on the Level of Assistance required for Functional Mobility and ADLs. SUBJECTIVE:   Patient stated I want to get up, but I'd rather go back to bed.       OBJECTIVE DATA SUMMARY:   Cognitive/Behavioral Status:  Neurologic State: Alert  Orientation Level: Oriented X4  Cognition: Appropriate decision making, Appropriate for age attention/concentration, Appropriate safety awareness, Follows commands        Functional Mobility and Transfers for ADLs:              Bed Mobility:        Sit to Supine: Stand-by asssistance  Scooting: Stand-by asssistance              Transfers:  Sit to Stand: Contact guard assistance                 Toilet Transfer : Contact guard assistance (simulated task w/FWW)                 Balance:  Sitting: Intact  Standing: Impaired; With support  Standing - Static: Good  Standing - Dynamic : Fair     ADL Intervention:   Lower Body Dressing Assistance  Underpants: Contact guard assistance (simulated)  Socks: Contact guard assistance  Adaptive Equipment Used: Reacher;Sock aid  Pain:  Pt reports 6/10 pain or discomfort prior to treatment. Pt reports 7/10 pain or discomfort post treatment. Activity Tolerance:    Fair     Please refer to the flowsheet for vital signs taken during this treatment.   After treatment:   [ ]  Patient left in no apparent distress sitting up in chair  [X]  Patient left in no apparent distress in bed  [X]  Call bell left within reach  [X]  Nursing notified  [ ]  Caregiver present  [ ]  Bed alarm activated     LEA Norwood  Time Calculation: 32 mins

## 2017-07-30 LAB
ALBUMIN SERPL BCP-MCNC: 1.8 G/DL (ref 3.4–5)
ALBUMIN/GLOB SERPL: 0.4 {RATIO} (ref 0.8–1.7)
ALP SERPL-CCNC: 924 U/L (ref 45–117)
ALT SERPL-CCNC: 39 U/L (ref 16–61)
ANION GAP BLD CALC-SCNC: 8 MMOL/L (ref 3–18)
AST SERPL W P-5'-P-CCNC: 24 U/L (ref 15–37)
BACTERIA SPEC CULT: NORMAL
BASOPHILS # BLD AUTO: 0 K/UL (ref 0–0.1)
BASOPHILS # BLD: 0 % (ref 0–2)
BILIRUB SERPL-MCNC: 1.2 MG/DL (ref 0.2–1)
BUN SERPL-MCNC: 6 MG/DL (ref 7–18)
BUN/CREAT SERPL: 11 (ref 12–20)
CALCIUM SERPL-MCNC: 8.1 MG/DL (ref 8.5–10.1)
CHLORIDE SERPL-SCNC: 101 MMOL/L (ref 100–108)
CO2 SERPL-SCNC: 25 MMOL/L (ref 21–32)
CREAT SERPL-MCNC: 0.57 MG/DL (ref 0.6–1.3)
DIFFERENTIAL METHOD BLD: ABNORMAL
EOSINOPHIL # BLD: 0.3 K/UL (ref 0–0.4)
EOSINOPHIL NFR BLD: 2 % (ref 0–5)
ERYTHROCYTE [DISTWIDTH] IN BLOOD BY AUTOMATED COUNT: 17 % (ref 11.6–14.5)
GLOBULIN SER CALC-MCNC: 4.1 G/DL (ref 2–4)
GLUCOSE SERPL-MCNC: 92 MG/DL (ref 74–99)
HCT VFR BLD AUTO: 27 % (ref 36–48)
HGB BLD-MCNC: 9.4 G/DL (ref 13–16)
LYMPHOCYTES # BLD AUTO: 16 % (ref 21–52)
LYMPHOCYTES # BLD: 2.4 K/UL (ref 0.9–3.6)
MCH RBC QN AUTO: 28.9 PG (ref 24–34)
MCHC RBC AUTO-ENTMCNC: 34.8 G/DL (ref 31–37)
MCV RBC AUTO: 83.1 FL (ref 74–97)
MONOCYTES # BLD: 1.4 K/UL (ref 0.05–1.2)
MONOCYTES NFR BLD AUTO: 9 % (ref 3–10)
NEUTS SEG # BLD: 11 K/UL (ref 1.8–8)
NEUTS SEG NFR BLD AUTO: 73 % (ref 40–73)
PLATELET # BLD AUTO: 465 K/UL (ref 135–420)
PMV BLD AUTO: 9.8 FL (ref 9.2–11.8)
POTASSIUM SERPL-SCNC: 3.3 MMOL/L (ref 3.5–5.5)
PROT SERPL-MCNC: 5.9 G/DL (ref 6.4–8.2)
RBC # BLD AUTO: 3.25 M/UL (ref 4.7–5.5)
SERVICE CMNT-IMP: NORMAL
SODIUM SERPL-SCNC: 134 MMOL/L (ref 136–145)
WBC # BLD AUTO: 15.2 K/UL (ref 4.6–13.2)

## 2017-07-30 PROCEDURE — 36415 COLL VENOUS BLD VENIPUNCTURE: CPT | Performed by: SURGERY

## 2017-07-30 PROCEDURE — 85025 COMPLETE CBC W/AUTO DIFF WBC: CPT | Performed by: SURGERY

## 2017-07-30 PROCEDURE — 80053 COMPREHEN METABOLIC PANEL: CPT | Performed by: SURGERY

## 2017-07-30 PROCEDURE — 97530 THERAPEUTIC ACTIVITIES: CPT

## 2017-07-30 PROCEDURE — 74011250637 HC RX REV CODE- 250/637: Performed by: PHYSICIAN ASSISTANT

## 2017-07-30 PROCEDURE — 65660000004 HC RM CVT STEPDOWN

## 2017-07-30 PROCEDURE — 74011250637 HC RX REV CODE- 250/637: Performed by: SURGERY

## 2017-07-30 PROCEDURE — 74011250636 HC RX REV CODE- 250/636: Performed by: PHYSICIAN ASSISTANT

## 2017-07-30 RX ORDER — OXYCODONE AND ACETAMINOPHEN 10; 325 MG/1; MG/1
1-2 TABLET ORAL
Status: DISCONTINUED | OUTPATIENT
Start: 2017-07-30 | End: 2017-08-07 | Stop reason: HOSPADM

## 2017-07-30 RX ORDER — POLYETHYLENE GLYCOL 3350 17 G/17G
17 POWDER, FOR SOLUTION ORAL ONCE
Status: COMPLETED | OUTPATIENT
Start: 2017-07-30 | End: 2017-07-30

## 2017-07-30 RX ORDER — POLYETHYLENE GLYCOL 3350 17 G/17G
17 POWDER, FOR SOLUTION ORAL DAILY
Status: DISCONTINUED | OUTPATIENT
Start: 2017-07-31 | End: 2017-08-07 | Stop reason: HOSPADM

## 2017-07-30 RX ORDER — GABAPENTIN 300 MG/1
300 CAPSULE ORAL 3 TIMES DAILY
Status: DISCONTINUED | OUTPATIENT
Start: 2017-07-30 | End: 2017-08-07 | Stop reason: HOSPADM

## 2017-07-30 RX ADMIN — AMIODARONE HYDROCHLORIDE 200 MG: 200 TABLET ORAL at 09:14

## 2017-07-30 RX ADMIN — GABAPENTIN 300 MG: 300 CAPSULE ORAL at 22:31

## 2017-07-30 RX ADMIN — POLYETHYLENE GLYCOL 3350 17 G: 17 POWDER, FOR SOLUTION ORAL at 13:22

## 2017-07-30 RX ADMIN — Medication 10 ML: at 07:11

## 2017-07-30 RX ADMIN — METOPROLOL TARTRATE 12.5 MG: 25 TABLET ORAL at 09:13

## 2017-07-30 RX ADMIN — MORPHINE SULFATE: 5 INJECTION, SOLUTION INTRAVENOUS at 08:15

## 2017-07-30 RX ADMIN — ATORVASTATIN CALCIUM 40 MG: 40 TABLET, FILM COATED ORAL at 22:31

## 2017-07-30 RX ADMIN — GABAPENTIN 300 MG: 300 CAPSULE ORAL at 15:38

## 2017-07-30 RX ADMIN — Medication 250 MG: at 09:14

## 2017-07-30 RX ADMIN — Medication 10 ML: at 22:31

## 2017-07-30 RX ADMIN — Medication 10 ML: at 13:24

## 2017-07-30 RX ADMIN — HYDROMORPHONE HYDROCHLORIDE 1 MG: 1 INJECTION, SOLUTION INTRAMUSCULAR; INTRAVENOUS; SUBCUTANEOUS at 15:43

## 2017-07-30 RX ADMIN — LOSARTAN POTASSIUM 25 MG: 25 TABLET ORAL at 09:15

## 2017-07-30 RX ADMIN — DILTIAZEM HYDROCHLORIDE 30 MG: 30 TABLET, FILM COATED ORAL at 13:22

## 2017-07-30 RX ADMIN — ASPIRIN 81 MG 81 MG: 81 TABLET ORAL at 09:14

## 2017-07-30 RX ADMIN — DULOXETINE HYDROCHLORIDE 60 MG: 60 CAPSULE, DELAYED RELEASE ORAL at 09:12

## 2017-07-30 RX ADMIN — ENOXAPARIN SODIUM 40 MG: 40 INJECTION SUBCUTANEOUS at 18:17

## 2017-07-30 RX ADMIN — DILTIAZEM HYDROCHLORIDE 30 MG: 30 TABLET, FILM COATED ORAL at 22:31

## 2017-07-30 RX ADMIN — DILTIAZEM HYDROCHLORIDE 30 MG: 30 TABLET, FILM COATED ORAL at 09:13

## 2017-07-30 RX ADMIN — CYANOCOBALAMIN TAB 1000 MCG 1000 MCG: 1000 TAB at 09:13

## 2017-07-30 RX ADMIN — Medication 325 MG: at 09:14

## 2017-07-30 RX ADMIN — GABAPENTIN 100 MG: 100 CAPSULE ORAL at 09:14

## 2017-07-30 RX ADMIN — VITAMIN D, TAB 1000IU (100/BT) 1000 UNITS: 25 TAB at 09:15

## 2017-07-30 RX ADMIN — METOPROLOL TARTRATE 12.5 MG: 25 TABLET ORAL at 22:30

## 2017-07-30 RX ADMIN — DOCUSATE SODIUM 100 MG: 100 CAPSULE, LIQUID FILLED ORAL at 09:13

## 2017-07-30 RX ADMIN — DILTIAZEM HYDROCHLORIDE 30 MG: 30 TABLET, FILM COATED ORAL at 18:17

## 2017-07-30 NOTE — PROGRESS NOTES
Problem: Mobility Impaired (Adult and Pediatric)  Goal: *Acute Goals and Plan of Care (Insert Text)  Physical Therapy Goals  Initiated 7/27/2017 and to be accomplished within 7 day(s)  1. Patient will move from supine to sit and sit to supine , scoot up and down and roll side to side in bed with supervision/set-up. 2. Patient will transfer from bed to chair and chair to bed with supervision/set-up using the least restrictive device. 3. Patient will perform sit to stand with supervision/set-up. 4. Patient will ambulate with supervision/set-up for >100 feet with the least restrictive device. 5. Patient will ascend/descend 4 stairs with 1 handrail(s) with supervision/set-up. Outcome: Progressing Towards Goal  PHYSICAL THERAPY TREATMENT     Patient: Reagan Floyd (31 y.o. male)  Date: 7/30/2017  Diagnosis: Severe sepsis with septic shock  Sepsis ([facility] [address]  DX <principal problem not specified>  Procedure(s) (LRB):  REMOVAL OF LEFT LEG INFECTED GRAFT (Left) 5 Days Post-Op  Precautions: Fall, WBAT   Chart, physical therapy assessment, plan of care and goals were reviewed. ASSESSMENT:  Patient continues to require assistance with management of  L LE on/off bed secondary to ongoing weakness and edema in B LE (L>R). Patient verbalized need to use the [facility] for BM. Patient required min A for bed mobility. Patient transitioned into standing CGA with RW, able to complete collectively from bed-->BSC-->chair at bedside ~ 10 ft CGA/SBA with RW, vc's for correct gt sequencing. Patient up in chair at conclusion of session, encouraged patient to increase OOB time. Patient verbalized comprehension. Will continue to progress patient as tolerated.   Progression toward goals:  [ ]      Improving appropriately and progressing toward goals  [X]      Improving slowly and progressing toward goals  [ ]      Not making progress toward goals and plan of care will be adjusted       PLAN:  Patient continues to benefit from skilled intervention to address the above impairments. Continue treatment per established plan of care. Discharge Recommendations:  Home Health  Further Equipment Recommendations for Discharge:  rolling walker       SUBJECTIVE:   Patient stated I need to use the bathroom.       OBJECTIVE DATA SUMMARY:   Critical Behavior:  Neurologic State: Alert, Appropriate for age  Orientation Level: Oriented X4  Cognition: Appropriate decision making, Appropriate for age attention/concentration, Appropriate safety awareness, Follows commands  Safety/Judgement: Awareness of environment, Fall prevention  Functional Mobility Training:  Bed Mobility:  Rolling: Supervision  Supine to Sit: Minimum assistance; Additional time  Scooting: Contact guard assistance              Interventions: Verbal cues;Manual cues  Transfers:  Sit to Stand: Contact guard assistance; Additional time (with RW)  Stand to Sit: Contact guard assistance (with RW)  Balance:  Sitting: Intact  Standing: Impaired;Pull to stand; With support (with RW)  Standing - Static: Fair;Good;Constant support (with RW)  Standing - Dynamic : Fair (with RW)  Ambulation/Gait Training:  Distance (ft): 10 Feet (ft)  Assistive Device: Walker, rolling  Ambulation - Level of Assistance: Contact guard assistance;Stand-by asssistance  Gait Abnormalities: Antalgic;Decreased step clearance; Step to gait  Left Side Weight Bearing: As tolerated  Base of Support: Shift to right  Stance: Left decreased  Speed/Suzanna: Pace decreased (<100 feet/min); Slow  Step Length: Left shortened;Right shortened  Interventions: Verbal cues  Pain:  Pain Scale 1: Numeric (0 - 10)  Pain Intensity 1: 4 (pre/post treatment)  Pain Location 1: Leg  Pain Orientation 1: Left  Pain Description 1: Aching  Pain Intervention(s) 1: Nurse notified; Rest  Activity Tolerance:   Fair/good  Please refer to the flowsheet for vital signs taken during this treatment.   After treatment:   [X] Patient left in no apparent distress sitting up in chair  [ ] Patient left in no apparent distress in bed  [X] Call bell left within reach  [X] Nursing notified  [ ] Caregiver present  [ ] Bed alarm activated      Jasmin Colón, PT   Time Calculation: 23 mins

## 2017-07-30 NOTE — PROGRESS NOTES
Still with elevated WBC. On abx. No significant rest pain but does get electric shocks to leg. Was able to walk. Most pain still at surgical sites and not foot or calf. Will continue to hold off on surgery. Possible Friday bypass but if continues to improve and off iv pain meds could be possible to discharge home pending iv abx needs. Will place back on coumadin if thinking discharge.

## 2017-07-31 LAB
ALBUMIN SERPL BCP-MCNC: 1.9 G/DL (ref 3.4–5)
ALBUMIN/GLOB SERPL: 0.4 {RATIO} (ref 0.8–1.7)
ALP SERPL-CCNC: 854 U/L (ref 45–117)
ALT SERPL-CCNC: 34 U/L (ref 16–61)
ANION GAP BLD CALC-SCNC: 7 MMOL/L (ref 3–18)
AST SERPL W P-5'-P-CCNC: 25 U/L (ref 15–37)
BASOPHILS # BLD AUTO: 0 K/UL (ref 0–0.1)
BASOPHILS # BLD: 0 % (ref 0–2)
BILIRUB SERPL-MCNC: 1.2 MG/DL (ref 0.2–1)
BUN SERPL-MCNC: 6 MG/DL (ref 7–18)
BUN/CREAT SERPL: 12 (ref 12–20)
CALCIUM SERPL-MCNC: 8.3 MG/DL (ref 8.5–10.1)
CHLORIDE SERPL-SCNC: 100 MMOL/L (ref 100–108)
CO2 SERPL-SCNC: 28 MMOL/L (ref 21–32)
CREAT SERPL-MCNC: 0.51 MG/DL (ref 0.6–1.3)
DIFFERENTIAL METHOD BLD: ABNORMAL
EOSINOPHIL # BLD: 0.3 K/UL (ref 0–0.4)
EOSINOPHIL NFR BLD: 2 % (ref 0–5)
ERYTHROCYTE [DISTWIDTH] IN BLOOD BY AUTOMATED COUNT: 17.7 % (ref 11.6–14.5)
GLOBULIN SER CALC-MCNC: 4.5 G/DL (ref 2–4)
GLUCOSE SERPL-MCNC: 94 MG/DL (ref 74–99)
HCT VFR BLD AUTO: 27.2 % (ref 36–48)
HGB BLD-MCNC: 9.2 G/DL (ref 13–16)
LYMPHOCYTES # BLD AUTO: 17 % (ref 21–52)
LYMPHOCYTES # BLD: 2.3 K/UL (ref 0.9–3.6)
MCH RBC QN AUTO: 28.5 PG (ref 24–34)
MCHC RBC AUTO-ENTMCNC: 33.8 G/DL (ref 31–37)
MCV RBC AUTO: 84.2 FL (ref 74–97)
MONOCYTES # BLD: 1.3 K/UL (ref 0.05–1.2)
MONOCYTES NFR BLD AUTO: 9 % (ref 3–10)
NEUTS SEG # BLD: 9.9 K/UL (ref 1.8–8)
NEUTS SEG NFR BLD AUTO: 72 % (ref 40–73)
PLATELET # BLD AUTO: 466 K/UL (ref 135–420)
PMV BLD AUTO: 9.5 FL (ref 9.2–11.8)
POTASSIUM SERPL-SCNC: 3.6 MMOL/L (ref 3.5–5.5)
PROT SERPL-MCNC: 6.4 G/DL (ref 6.4–8.2)
RBC # BLD AUTO: 3.23 M/UL (ref 4.7–5.5)
SODIUM SERPL-SCNC: 135 MMOL/L (ref 136–145)
WBC # BLD AUTO: 13.8 K/UL (ref 4.6–13.2)

## 2017-07-31 PROCEDURE — 85025 COMPLETE CBC W/AUTO DIFF WBC: CPT | Performed by: SURGERY

## 2017-07-31 PROCEDURE — 77030025414 HC DRSG VAC ASST SMPLC KCON -B

## 2017-07-31 PROCEDURE — 97116 GAIT TRAINING THERAPY: CPT

## 2017-07-31 PROCEDURE — 74011250636 HC RX REV CODE- 250/636: Performed by: SURGERY

## 2017-07-31 PROCEDURE — 80053 COMPREHEN METABOLIC PANEL: CPT | Performed by: SURGERY

## 2017-07-31 PROCEDURE — 74011250636 HC RX REV CODE- 250/636: Performed by: PHYSICIAN ASSISTANT

## 2017-07-31 PROCEDURE — 36415 COLL VENOUS BLD VENIPUNCTURE: CPT | Performed by: SURGERY

## 2017-07-31 PROCEDURE — 97110 THERAPEUTIC EXERCISES: CPT

## 2017-07-31 PROCEDURE — 74011250637 HC RX REV CODE- 250/637: Performed by: PHYSICIAN ASSISTANT

## 2017-07-31 PROCEDURE — 74011250636 HC RX REV CODE- 250/636: Performed by: INTERNAL MEDICINE

## 2017-07-31 PROCEDURE — 65660000004 HC RM CVT STEPDOWN

## 2017-07-31 PROCEDURE — 97535 SELF CARE MNGMENT TRAINING: CPT

## 2017-07-31 PROCEDURE — 74011000250 HC RX REV CODE- 250: Performed by: INTERNAL MEDICINE

## 2017-07-31 PROCEDURE — 74011250637 HC RX REV CODE- 250/637: Performed by: SURGERY

## 2017-07-31 RX ADMIN — Medication 250 MG: at 08:48

## 2017-07-31 RX ADMIN — HYDROMORPHONE HYDROCHLORIDE 1 MG: 1 INJECTION, SOLUTION INTRAMUSCULAR; INTRAVENOUS; SUBCUTANEOUS at 20:33

## 2017-07-31 RX ADMIN — LOSARTAN POTASSIUM 25 MG: 25 TABLET ORAL at 08:53

## 2017-07-31 RX ADMIN — GABAPENTIN 300 MG: 300 CAPSULE ORAL at 08:53

## 2017-07-31 RX ADMIN — ASPIRIN 81 MG 81 MG: 81 TABLET ORAL at 08:53

## 2017-07-31 RX ADMIN — METOPROLOL TARTRATE 12.5 MG: 25 TABLET ORAL at 08:51

## 2017-07-31 RX ADMIN — DILTIAZEM HYDROCHLORIDE 30 MG: 30 TABLET, FILM COATED ORAL at 19:16

## 2017-07-31 RX ADMIN — MORPHINE SULFATE: 5 INJECTION, SOLUTION INTRAVENOUS at 08:02

## 2017-07-31 RX ADMIN — GABAPENTIN 300 MG: 300 CAPSULE ORAL at 21:13

## 2017-07-31 RX ADMIN — Medication 325 MG: at 08:53

## 2017-07-31 RX ADMIN — ATORVASTATIN CALCIUM 40 MG: 40 TABLET, FILM COATED ORAL at 21:13

## 2017-07-31 RX ADMIN — NAFCILLIN 12 G: 10 INJECTION, POWDER, FOR SOLUTION INTRAVENOUS at 02:35

## 2017-07-31 RX ADMIN — HYDROMORPHONE HYDROCHLORIDE 1 MG: 1 INJECTION, SOLUTION INTRAMUSCULAR; INTRAVENOUS; SUBCUTANEOUS at 14:21

## 2017-07-31 RX ADMIN — ENOXAPARIN SODIUM 40 MG: 40 INJECTION SUBCUTANEOUS at 19:18

## 2017-07-31 RX ADMIN — DILTIAZEM HYDROCHLORIDE 30 MG: 30 TABLET, FILM COATED ORAL at 14:21

## 2017-07-31 RX ADMIN — Medication 10 ML: at 14:00

## 2017-07-31 RX ADMIN — VITAMIN D, TAB 1000IU (100/BT) 1000 UNITS: 25 TAB at 08:52

## 2017-07-31 RX ADMIN — Medication 10 ML: at 22:00

## 2017-07-31 RX ADMIN — Medication 10 ML: at 06:33

## 2017-07-31 RX ADMIN — METOPROLOL TARTRATE 12.5 MG: 25 TABLET ORAL at 20:26

## 2017-07-31 RX ADMIN — GABAPENTIN 300 MG: 300 CAPSULE ORAL at 19:16

## 2017-07-31 RX ADMIN — DOCUSATE SODIUM 100 MG: 100 CAPSULE, LIQUID FILLED ORAL at 08:52

## 2017-07-31 RX ADMIN — POLYETHYLENE GLYCOL 3350 17 G: 17 POWDER, FOR SOLUTION ORAL at 08:55

## 2017-07-31 RX ADMIN — AMIODARONE HYDROCHLORIDE 200 MG: 200 TABLET ORAL at 08:53

## 2017-07-31 RX ADMIN — CYANOCOBALAMIN TAB 1000 MCG 1000 MCG: 1000 TAB at 08:52

## 2017-07-31 RX ADMIN — DULOXETINE HYDROCHLORIDE 60 MG: 60 CAPSULE, DELAYED RELEASE ORAL at 08:52

## 2017-07-31 RX ADMIN — DIPHENHYDRAMINE HYDROCHLORIDE 12.5 MG: 50 INJECTION, SOLUTION INTRAMUSCULAR; INTRAVENOUS at 20:33

## 2017-07-31 RX ADMIN — DILTIAZEM HYDROCHLORIDE 30 MG: 30 TABLET, FILM COATED ORAL at 21:13

## 2017-07-31 RX ADMIN — DILTIAZEM HYDROCHLORIDE 30 MG: 30 TABLET, FILM COATED ORAL at 08:52

## 2017-07-31 NOTE — PROGRESS NOTES
Pt sitting up on side of bed, working with PT  No significant rest pain but does get electric shocks to leg - complaining mostly in left anterior shin. As discussed with Dr Danielle Henry yesterday, will continue to hopefully be able to hold off on surgery.   Possible Friday bypass if worsening pain, but if continues to improve and off iv pain meds could be possible to discharge home   Will review assessment with Dr Danielle Henry

## 2017-07-31 NOTE — PROGRESS NOTES
NUTRITION    Nursing Referral: [facility]     RECOMMENDATIONS / PLAN:     - Adjust supplements: Ensure Enlive BID.  - Continue RD inpatient monitoring and evaluation. NUTRITION INTERVENTIONS & DIAGNOSIS:     [x] Meals/Snacks: modified diet  [x] Medical food supplementation: adjust   [x] Vitamin/mineral supplementation: ascorbic acid, cholecalciferol, cyanocobalamin, ferrous sulfate    Nutrition Diagnosis: Unintentional weight loss related to inadequate energy intake as evidenced by 20 lb, 11% weight loss x 4 months. ASSESSMENT:     7/31: +wound vac. Pt reports fair appetite-breakfast is best meal of the day. Several Ensure bottles at bedside-pt agreeable to decreasing to BID. Per chart review LBM 7/22-pt on bowel meds. 7/26: Diet resumed after procedure yesterday evening. S/p vascular surgery. 7/25: NPO for possible procedure today. Appetite poor for several days PTA. Agreeable to supplements.      Average po intake adequate to meet patients estimated nutritional needs:   [] Yes     [x] No   [] Unable to determine at this time    Diet: DIET CARDIAC Regular  DIET NUTRITIONAL SUPPLEMENTS All Meals; ENSURE ENLIVE       Food Allergies: NKFA  Current Appetite:   [] Good     [x] Fair     [] Poor     [] Other:  Appetite/meal intake prior to admission:   [] Good     [x] Fair     [x] Poor x 3-4 days PTA, eating well before then    [] Other:  Feeding Limitations:  [] Swallowing difficulty    [x] Chewing difficulty: missing teeth    [] Other:  Current Meal Intake:   Patient Vitals for the past 100 hrs:   % Diet Eaten   07/29/17 0856 50 %   07/28/17 1758 35 %   07/28/17 1341 25 %   07/28/17 0812 30 %   07/27/17 1738 100 %   07/27/17 1150 50 %   07/27/17 0800 100 %     BM: 7/22   Skin Integrity: right ankle vascular wound, leg vascular wound, groin vascular wound; wound VAC to groin/leg wound  Edema: none   Pertinent Medications: Reviewed: colace, Miralax    Recent Labs      07/31/17   0505  07/30/17   0350  07/29/17   0156 NA  135*  134*  132*   K  3.6  3.3*  3.6   CL  100  101  100   CO2  28  25  23   GLU  94  92  96   BUN  6*  6*  8   CREA  0.51*  0.57*  0.64   CA  8.3*  8.1*  8.5   ALB  1.9*  1.8*  1.8*   SGOT  25  24  35   ALT  34  39  54       Intake/Output Summary (Last 24 hours) at 07/31/17 1107  Last data filed at 07/31/17 0713   Gross per 24 hour   Intake                0 ml   Output              600 ml   Net             -600 ml       Anthropometrics:  Ht Readings from Last 1 Encounters:   07/24/17 5' 10\" (1.778 m)     Last 3 Recorded Weights in this Encounter    07/29/17 0400 07/30/17 0610 07/31/17 0533   Weight: 79.5 kg (175 lb 3.2 oz) 73.3 kg (161 lb 9.6 oz) 73.3 kg (161 lb 9.6 oz)     Body mass index is 23.19 kg/(m^2). Weight History: pt reports weight loss PTA, previous weights: 190 lb in March 2017 and 180 lb in April 2017 (20 lb, 11% weight loss x 4 months)    Weight Metrics 7/31/2017 7/24/2017 7/17/2017 7/12/2017 6/27/2017 6/22/2017 6/19/2017   Weight 161 lb 9.6 oz - 156 lb 156 lb 14.4 oz - 165 lb 160 lb   BMI - 23.19 kg/m2 22.38 kg/m2 - 22.51 kg/m2 23.68 kg/m2 22.96 kg/m2        Admitting Diagnosis: Severe sepsis with septic shock  Sepsis (HCC)  DX  Pertinent PMHx: CAD, HTN, PAD    Education Needs:        [x] None identified  [] Identified - Not appropriate at this time  []  Identified and addressed - refer to education log  Learning Limitations:   [x] None identified  [] Identified    Cultural, Oriental orthodox & ethnic food preferences:  [x] None identified    [] Identified and addressed     ESTIMATED NUTRITION NEEDS:     Calories: 8011-9132 kcal (MSJx1.2-1.3) based on  [x] Actual BW 77 kg     [] IBW   Protein: 62-92 gm (0.8-1.2 gm/kg) based on  [x] Actual BW      [] IBW   Fluid: 1 mL/kcal     MONITORING & EVALUATION:     Nutrition Goal(s):   1. Po intake of meals will meet >75% of patient estimated nutritional needs within the next 7 days.   Outcome:  [] Met/Ongoing    [x]  Not Met/Progressing    [] New/Initial Goal     Monitoring:   [x] Diet tolerance   [x] Meal intake   [x] Supplement intake   [] GI symptoms/ability to tolerate po diet   [] Respiratory status   [] Plan of care      Previous Recommendations (for follow-up assessments only):     [x]   Implemented       []   Not Implemented (RD to address)     [] No Recommendation Made     Discharge Planning: cardiac diet  [x] Participated in care planning, discharge planning, & interdisciplinary rounds as appropriate      Lorna Benton, [address] [place] [address]   Pager: [phone]

## 2017-07-31 NOTE — PROGRESS NOTES
Infectious Disease progress Note    Requested by: Dr Arnie Olivares    Reason:     Current abx Prior abx   Nafcillin since 7/28 Pip/tazo, vancomycin 7/24-7/28     Lines:       Assessment :    Reagan Floyd is a 59 y.o. male with CAD, severe peripheral arterial insufficiency admitted to [facility] on 7/24/17 with gram positive bacteremia, exposed left groin vascular graft. Clinical picture c/w sepsis (POA) due to MSSA bloodstream infection (positive blood cultures 7/22, 7/24; negative blood cultures 7/28). Most likely source of MSSA bacteremia is infected left groin vascular graft. Patient appropriately underwent removal of infected graft, Repair of superficial femoral artery, left side; Repair of common femoral artery on 7/25/17. Intra op cx: MSSA. Currently he is clinically stable. LLE is cool to touch. Recommendations:    1. cont nafcillin continuous infusion  2. F/u vascular surgery recommendations      Above plan was discussed in details with patient. Will d/w dr Arnie Olivares. Please call me if any further questions or concerns. Will continue to participate in the care of this patient. subjective:    Patient complains of some pain at the recent surgical site left groin. Patient denies headaches, visual disturbances, sore throat, runny nose, earaches, cp, sob, chills, cough, abdominal pain, diarrhea, burning micturition,  or weakness in extremities. He denies back pain/flank pain. He denies recent sick contacts. No h/o recent travel. No known h/o MRSA colonization or infection in the past.        Home Medication List    Details   amiodarone (CORDARONE) 200 mg tablet Take 1 Tab by mouth daily. Indications: VENTRICULAR RATE CONTROL IN ATRIAL FIBRILLATION  Qty: 60 Tab, Refills: 0      clopidogrel (PLAVIX) 75 mg tab Take 1 Tab by mouth daily. Qty: 90 Tab, Refills: 3      oxyCODONE-acetaminophen (PERCOCET) 5-325 mg per tablet Take 2 Tabs by mouth every four (4) hours as needed. Max Daily Amount: 12 Tabs.   Qty: 60 Tab, Refills: 0      gabapentin (NEURONTIN) 100 mg capsule Take 1 Cap by mouth two (2) times a day. Indications: NEUROPATHIC PAIN  Qty: 60 Cap, Refills: 9      aspirin 81 mg chewable tablet Take 1 Tab by mouth daily (with breakfast). Qty: 90 Tab, Refills: 3      cholecalciferol (VITAMIN D3) 1,000 unit tablet Take 1 Tab by mouth daily. Qty: 90 Tab, Refills: 3      losartan (COZAAR) 25 mg tablet Take 1 Tab by mouth daily. Indications: Chronic Heart Failure, hypertension  Qty: 90 Tab, Refills: 2      ascorbic acid, vitamin C, (VITAMIN C) 250 mg tablet Take 1 Tab by mouth daily (with breakfast). Qty: 90 Tab, Refills: 2      DULoxetine (CYMBALTA) 60 mg capsule Take 1 Cap by mouth daily. Qty: 90 Cap, Refills: 2      zinc sulfate (ZINCATE) 220 (50) mg capsule Take 1 Cap by mouth daily. Qty: 90 Cap, Refills: 0      ferrous sulfate 325 mg (65 mg iron) tablet Take 1 Tab by mouth daily (with breakfast). Qty: 90 Tab, Refills: 3      atorvastatin (LIPITOR) 40 mg tablet Take 1 Tab by mouth nightly. Indications: DYSLIPIDEMIA  Qty: 90 Tab, Refills: 3      metoprolol tartrate (LOPRESSOR) 25 mg tablet Take 0.5 Tabs by mouth every twelve (12) hours. Indications: hypertension, VENTRICULAR RATE CONTROL IN ATRIAL FIBRILLATION  Qty: 30 Tab, Refills: 6      dilTIAZem (CARDIZEM) 30 mg tablet Take 1 Tab by mouth Before breakfast, lunch, dinner and at bedtime. Indications: hypertension  Qty: 120 Tab, Refills: 6      cyanocobalamin (VITAMIN B-12) 1,000 mcg tablet Take 1 Tab by mouth daily.   Qty: 90 Tab, Refills: 3             Current Facility-Administered Medications   Medication Dose Route Frequency    gabapentin (NEURONTIN) capsule 300 mg  300 mg Oral TID    oxyCODONE-acetaminophen (PERCOCET 10)  mg per tablet 1-2 Tab  1-2 Tab Oral Q4H PRN    polyethylene glycol (MIRALAX) packet 17 g  17 g Oral DAILY    nafcillin (NALLPEN) 12 g in 0.9% sodium chloride 1,000 mL infusion  12 g IntraVENous CONTINUOUS    0.9% sodium chloride infusion 250 mL  250 mL IntraVENous PRN    sodium chloride (NS) flush 5-10 mL  5-10 mL IntraVENous Q8H    sodium chloride (NS) flush 5-10 mL  5-10 mL IntraVENous PRN    morphine (PF)  mg/30 ml   IntraVENous TITRATE    naloxone (NARCAN) injection 0.1 mg  0.1 mg IntraVENous PRN    atorvastatin (LIPITOR) tablet 40 mg  40 mg Oral QHS    acetaminophen (TYLENOL) tablet 650 mg  650 mg Oral Q4H PRN    HYDROmorphone (PF) (DILAUDID) injection 1 mg  1 mg IntraVENous Q3H PRN    diphenhydrAMINE (BENADRYL) injection 12.5 mg  12.5 mg IntraVENous Q4H PRN    ondansetron (ZOFRAN) injection 4 mg  4 mg IntraVENous Q4H PRN    docusate sodium (COLACE) capsule 100 mg  100 mg Oral DAILY    enoxaparin (LOVENOX) injection 40 mg  40 mg SubCUTAneous Q24H    amiodarone (CORDARONE) tablet 200 mg  200 mg Oral DAILY    ascorbic acid (vitamin C) (VITAMIN C) tablet 250 mg  250 mg Oral DAILY    aspirin chewable tablet 81 mg  81 mg Oral DAILY    cholecalciferol (VITAMIN D3) tablet 1,000 Units  1,000 Units Oral DAILY    cyanocobalamin tablet 1,000 mcg  1,000 mcg Oral DAILY    dilTIAZem (CARDIZEM) IR tablet 30 mg  30 mg Oral QID    DULoxetine (CYMBALTA) capsule 60 mg  60 mg Oral DAILY    ferrous sulfate tablet 325 mg  1 Tab Oral DAILY WITH BREAKFAST    metoprolol tartrate (LOPRESSOR) tablet 12.5 mg  12.5 mg Oral Q12H    losartan (COZAAR) tablet 25 mg  25 mg Oral DAILY       Allergies: Review of patient's allergies indicates no known allergies. Temp (24hrs), Av.9 °F (36.6 °C), Min:97.3 °F (36.3 °C), Max:98.3 °F (36.8 °C)    Visit Vitals    /79 (BP 1 Location: Left arm, BP Patient Position: At rest)    Pulse 73    Temp 97.9 °F (36.6 °C)    Resp 20    Ht 5' 10\" (1.778 m)    Wt 73.3 kg (161 lb 9.6 oz)    SpO2 96%    BMI 23.19 kg/m2       ROS: 12 point ROS obtained in details.  Pertinent positives as mentioned in HPI,   otherwise negative    Physical Exam:    General: Well developed, well nourished male laying on the bed AAOx3 in no acute distress. General:   awake alert and oriented   HEENT:  Normocephalic, atraumatic, PERRL, EOMI, no scleral icterus or pallor; no conjunctival hemmohage;  nasal and oral mucous are moist and without evidence of lesions. No thrush. Neck supple, no bruits. Lymph Nodes:   no cervical, axillary or inguinal adenopathy   Lungs:   non-labored, bilaterally clear to auscultation- no crackles wheezes rales or rhonchi   Heart:  RRR, s1 and s2; no  rubs or gallops, no edema, + pedal pulses   Abdomen:  soft, non-distended, active bowel sounds, no hepatomegaly, no splenomegaly. Non-tender   Genitourinary:  deferred   Extremities:   no clubbing, cyanosis; no joint effusions or swelling; wound vac left groin; muscle mass appropriate for age, LLE cool to touch. Tenderness on palpation of shin LLE   Neurologic:  No gross focal sensory abnormalities; 5/5 muscle strength to upper and lower extremities. Speech appropriate.  Cranial nerves intact                        Skin:  Surgical changes left groin   Back:  no spinal or paraspinal muscle tenderness or rigidity, no CVA tenderness     Psychiatric:  No suicidal or homicidal ideations, appropriate mood and affect         Labs: Results:   Chemistry Recent Labs      07/31/17   0505  07/30/17   0350  07/29/17   0338   GLU  94  92  96   NA  135*  134*  132*   K  3.6  3.3*  3.6   CL  100  101  100   CO2  28  25  23   BUN  6*  6*  8   CREA  0.51*  0.57*  0.64   CA  8.3*  8.1*  8.5   AGAP  7  8  9   BUCR  12  11*  13   AP  854*  924*  943*   TP  6.4  5.9*  6.2*   ALB  1.9*  1.8*  1.8*   GLOB  4.5*  4.1*  4.4*   AGRAT  0.4*  0.4*  0.4*      CBC w/Diff Recent Labs      07/31/17   0505  07/30/17   0350  07/29/17   0338   WBC  13.8*  15.2*  16.3*   RBC  3.23*  3.25*  3.61*   HGB  9.2*  9.4*  10.3*   HCT  27.2*  27.0*  30.4*   PLT  466*  465*  383   GRANS  72  73  75   LYMPH  17*  16*  15*   EOS  2  2  3      Microbiology Recent Labs      07/28/17   1659   CULT  NO GROWTH 2 DAYS          RADIOLOGY:    All available imaging studies/reports in [facility] for this admission were reviewed    Dr. Sadie Carbajal, Infectious Disease Specialist  [phone]  July 31, 2017  4:21 PM

## 2017-07-31 NOTE — PROGRESS NOTES
Problem: Self Care Deficits Care Plan (Adult)  Goal: *Acute Goals and Plan of Care (Insert Text)  Occupational Therapy Goals  Initiated 7/27/2017 within 7 day(s). 1. Patient will perform LE bathing with supervision using AE   2. Patient will perform toilet transfers/toileting with contact guard assist.  3. Patient will perform lower body dressing with supervision using AE   Outcome: Progressing Towards Goal  OCCUPATIONAL THERAPY TREATMENT     Patient: Meredith Knight (44 y.o. male)  Date: 7/31/2017  Diagnosis: Severe sepsis with septic shock  Sepsis ([facility] [address]  DX <principal problem not specified>  Procedure(s) (LRB):  REMOVAL OF LEFT LEG INFECTED GRAFT (Left) 6 Days Post-Op  Precautions: Fall, WBAT LLE  Chart, occupational therapy assessment, plan of care, and goals were reviewed. ASSESSMENT:  Patient was in bed upon arrival. Patient sat EOB with supervision for bed mobility. Patient donned/doffed socks with supervision/SBA using AE/adaptive strategies. Patient simulated LE bathing with supervision. Educated patient on adaptive bathing strategies and use of shower chair at home to increase safety and independence with bathing tasks. Patient was receptive to education. Patient politely refused to practice toilet transfer at this time. Patient was left comfortable sitting on EOB with PT present. Progression toward goals:  [X]          Improving appropriately and progressing toward goals  [ ]          Improving slowly and progressing toward goals  [ ]          Not making progress toward goals and plan of care will be adjusted       PLAN:  Patient continues to benefit from skilled intervention to address the above impairments. Continue treatment per established plan of care. Discharge Recommendations:  Home Health with supervision  Further Equipment Recommendations for Discharge:  N/A      G-CODES:      Self Care  Current  CI= 1-19%   Goal  CI= 1-19%.   The severity rating is based on the Level of Assistance required for Functional Mobility and ADLs. SUBJECTIVE:   Patient stated Jose [place] prefer to get back into bed.       OBJECTIVE DATA SUMMARY:   Cognitive/Behavioral Status:  Neurologic State: Alert  Orientation Level: Oriented X4  Cognition: Follows commands  Safety/Judgement: Awareness of environment, Fall prevention     Functional Mobility and Transfers for ADLs:              Bed Mobility:  Supine to Sit: Supervision  Scooting: Supervision              Transfers:   Not done at this time     Balance:  Sitting: Intact     ADL Intervention:  Basic ADL  Bathing: Supervision;Stand-by assistance (simulated. Pt. will have shower chair at home.)  Lower Body Dressing: Supervision;Stand-by assistance (with AE/socks)     Pain:  Pt reports 0/10 pain or discomfort prior to treatment. Pt reports 0/10 pain or discomfort post treatment. Activity Tolerance:    Good     Please refer to the flowsheet for vital signs taken during this treatment. After treatment:   [ ]  Patient left in no apparent distress sitting up in chair  [X]  Patient left in no apparent distress in bed  [X]  Call bell left within reach  [X]  Nursing notified; IV   [X]  PT present  [ ]  Bed alarm activated      COMMUNICATION/EDUCATION:   [ ] Home safety education was provided and the patient/caregiver indicated understanding. [X] Patient/family have participated as able in goal setting and plan of care. [X] Patient/family agree to work toward stated goals and plan of care. [ ] Patient understands intent and goals of therapy, but is neutral about his/her participation. [ ] Patient is unable to participate in goal setting and plan of care.         Sushil Schmitt [facility] [place]  Time Calculation: 16 mins

## 2017-07-31 NOTE — PROGRESS NOTES
ARU/IPR REFERRAL CONTACT NOTE  [address] [place] [address] for Physical Rehabilitation    RE:  Natalia Huerta   Thank you for the opportunity to review this patient's case for admission to [address] [place] [address] for Physical Rehabilitation. Based on our pre-admission screening:     [x ] Our Team/Medical Director is following this case. Comments: This is a 59year old male admitted with exposed left groin graft and severe sepsis. Pt is presently on a continuous morphine PCA. He would need to be off all IV pain medications x24 hours with adequate pain control, the ability to participate in 3 hours of therapy per day at an ARU level and have and some one that can stay with the pt 24/7 for the first week or two post ARU discharge. We will continue to follow pts PT/OT progress notes and medical status and advise following review with the team        Again, Thank you for this referral. Should you have any questions please do not hesitate to call. Sincerely,  Jessica Peralta. Yariel Landis, [address]   Yariel Landis RN  Admissions [facility] for Physical Rehabilitation  [phone]

## 2017-07-31 NOTE — PROGRESS NOTES
Problem: Mobility Impaired (Adult and Pediatric)  Goal: *Acute Goals and Plan of Care (Insert Text)  Physical Therapy Goals  Initiated 7/27/2017 and to be accomplished within 7 day(s)  1. Patient will move from supine to sit and sit to supine , scoot up and down and roll side to side in bed with supervision/set-up. 2. Patient will transfer from bed to chair and chair to bed with supervision/set-up using the least restrictive device. 3. Patient will perform sit to stand with supervision/set-up. 4. Patient will ambulate with supervision/set-up for >100 feet with the least restrictive device. 5. Patient will ascend/descend 4 stairs with 1 handrail(s) with supervision/set-up. PHYSICAL THERAPY TREATMENT     Patient: Regi Amado (88 y.o. male)  Date: 7/31/2017  Diagnosis: Severe sepsis with septic shock  Sepsis ([facility] [address]  DX <principal problem not specified>  Procedure(s) (LRB):  REMOVAL OF LEFT LEG INFECTED GRAFT (Left) 6 Days Post-Op  Precautions: Fall, WBAT  Chart, physical therapy assessment, plan of care and goals were reviewed. ASSESSMENT:  Pt progressing well as indicated by increased single trial ambulation distance without increased assist req'd. Pt demo's increased L SLS as evidenced by decreased antalgia with stance and progression toward step through pattern (modified step through now). Progression toward goals:  [X]      Improving appropriately and progressing toward goals  [X]      Improving slowly and progressing toward goals  [ ]      Not making progress toward goals and plan of care will be adjusted       PLAN:    Patient continues to benefit from skilled intervention to address the above impairments. Continue treatment per established plan of care. Discharge Recommendations:  Rehab  Further Equipment Recommendations for Discharge:  N/A          SUBJECTIVE:   Patient stated No pain up here (thigh) but it is a amrsha burning down here (points to entire anterior of lower L LE).       OBJECTIVE DATA SUMMARY:   Critical Behavior:  Neurologic State: Alert  Orientation Level: Oriented X4  Cognition: Follows commands  Safety/Judgement: Awareness of environment, Fall prevention  Functional Mobility Training:  Bed Mobility:  Rolling: Supervision  Supine to Sit: Supervision  Sit to Supine: Supervision  Scooting: Supervision  Transfers:  Sit to Stand: Contact guard assistance;Minimum assistance  Stand to Sit: Contact guard assistance  Balance:  Sitting: Intact  Standing: Impaired; With support  Standing - Static: Fair  Standing - Dynamic : Fair  Ambulation/Gait Training:  Distance (ft): 40 Feet (ft)  Assistive Device: Walker, rolling  Ambulation - Level of Assistance: Stand-by asssistance  Gait Abnormalities: Antalgic; Steppage gait; Decreased step clearance  Left Side Weight Bearing: As tolerated  Base of Support: Shift to right  Stance: Left decreased  Speed/Suzanna: Pace decreased (<100 feet/min)  Step Length: Left shortened;Right shortened (R>L)  Swing Pattern: Left asymmetrical  Interventions: Verbal cues (horizontal gaze.)  Therapeutic Exercises:   Seated: laq,h/t raises,marches  Sets: 1  Reps; 20  Assist level: supervision with some visual targeting for use of full ROM. Pain:  Pt reports /10 pain or discomfort prior to treatment. Pt reports /10 pain or discomfort post treatment. Pt describes pain in anterior lower L LE as \"burning\",however, does not quantify. Activity Tolerance:   Fair: pt is limited by L LE pain (burning to anterior lower leg) as well as some mild STEWART. Please refer to the flowsheet for vital signs taken during this treatment.   After treatment:   [ ] Patient left in no apparent distress sitting up in chair  [X] Patient left in no apparent distress in bed  [X] Call bell left within reach  [ ] Nursing notified  [ ] Caregiver present  [ ] Bed alarm activated      [place] Breath, PTA   Time Calculation: 24 mins

## 2017-08-01 ENCOUNTER — APPOINTMENT (OUTPATIENT)
Dept: INTERVENTIONAL RADIOLOGY/VASCULAR | Age: 57
DRG: 252 | End: 2017-08-01
Attending: INTERNAL MEDICINE
Payer: COMMERCIAL

## 2017-08-01 ENCOUNTER — APPOINTMENT (OUTPATIENT)
Dept: GENERAL RADIOLOGY | Age: 57
DRG: 252 | End: 2017-08-01
Attending: SURGERY
Payer: COMMERCIAL

## 2017-08-01 LAB
ALBUMIN SERPL BCP-MCNC: 1.9 G/DL (ref 3.4–5)
ALBUMIN/GLOB SERPL: 0.4 {RATIO} (ref 0.8–1.7)
ALP SERPL-CCNC: 791 U/L (ref 45–117)
ALT SERPL-CCNC: 31 U/L (ref 16–61)
ANION GAP BLD CALC-SCNC: 7 MMOL/L (ref 3–18)
AST SERPL W P-5'-P-CCNC: 23 U/L (ref 15–37)
BASOPHILS # BLD AUTO: 0 K/UL (ref 0–0.1)
BASOPHILS # BLD: 0 % (ref 0–2)
BILIRUB SERPL-MCNC: 1.7 MG/DL (ref 0.2–1)
BUN SERPL-MCNC: 8 MG/DL (ref 7–18)
BUN/CREAT SERPL: 13 (ref 12–20)
CALCIUM SERPL-MCNC: 8.2 MG/DL (ref 8.5–10.1)
CHLORIDE SERPL-SCNC: 100 MMOL/L (ref 100–108)
CO2 SERPL-SCNC: 26 MMOL/L (ref 21–32)
CREAT SERPL-MCNC: 0.63 MG/DL (ref 0.6–1.3)
DIFFERENTIAL METHOD BLD: ABNORMAL
EOSINOPHIL # BLD: 0.2 K/UL (ref 0–0.4)
EOSINOPHIL NFR BLD: 1 % (ref 0–5)
ERYTHROCYTE [DISTWIDTH] IN BLOOD BY AUTOMATED COUNT: 18.5 % (ref 11.6–14.5)
GLOBULIN SER CALC-MCNC: 4.6 G/DL (ref 2–4)
GLUCOSE BLD STRIP.AUTO-MCNC: 112 MG/DL (ref 70–110)
GLUCOSE SERPL-MCNC: 89 MG/DL (ref 74–99)
HCT VFR BLD AUTO: 26.1 % (ref 36–48)
HGB BLD-MCNC: 8.7 G/DL (ref 13–16)
LACTATE SERPL-SCNC: 1.2 MMOL/L (ref 0.4–2)
LYMPHOCYTES # BLD AUTO: 15 % (ref 21–52)
LYMPHOCYTES # BLD: 2.1 K/UL (ref 0.9–3.6)
MCH RBC QN AUTO: 28.2 PG (ref 24–34)
MCHC RBC AUTO-ENTMCNC: 33.3 G/DL (ref 31–37)
MCV RBC AUTO: 84.7 FL (ref 74–97)
MONOCYTES # BLD: 1.2 K/UL (ref 0.05–1.2)
MONOCYTES NFR BLD AUTO: 9 % (ref 3–10)
NEUTS SEG # BLD: 10.2 K/UL (ref 1.8–8)
NEUTS SEG NFR BLD AUTO: 75 % (ref 40–73)
PLATELET # BLD AUTO: 502 K/UL (ref 135–420)
PMV BLD AUTO: 9.4 FL (ref 9.2–11.8)
POTASSIUM SERPL-SCNC: 3.6 MMOL/L (ref 3.5–5.5)
PROT SERPL-MCNC: 6.5 G/DL (ref 6.4–8.2)
RBC # BLD AUTO: 3.08 M/UL (ref 4.7–5.5)
SODIUM SERPL-SCNC: 133 MMOL/L (ref 136–145)
WBC # BLD AUTO: 13.8 K/UL (ref 4.6–13.2)

## 2017-08-01 PROCEDURE — 82962 GLUCOSE BLOOD TEST: CPT

## 2017-08-01 PROCEDURE — 80053 COMPREHEN METABOLIC PANEL: CPT | Performed by: SURGERY

## 2017-08-01 PROCEDURE — 71010 XR CHEST PORT: CPT

## 2017-08-01 PROCEDURE — 74011250637 HC RX REV CODE- 250/637: Performed by: PHYSICIAN ASSISTANT

## 2017-08-01 PROCEDURE — 85025 COMPLETE CBC W/AUTO DIFF WBC: CPT | Performed by: SURGERY

## 2017-08-01 PROCEDURE — 74011250637 HC RX REV CODE- 250/637: Performed by: SURGERY

## 2017-08-01 PROCEDURE — 74011250636 HC RX REV CODE- 250/636: Performed by: SURGERY

## 2017-08-01 PROCEDURE — 97535 SELF CARE MNGMENT TRAINING: CPT

## 2017-08-01 PROCEDURE — 93005 ELECTROCARDIOGRAM TRACING: CPT

## 2017-08-01 PROCEDURE — 36415 COLL VENOUS BLD VENIPUNCTURE: CPT | Performed by: SURGERY

## 2017-08-01 PROCEDURE — 65660000004 HC RM CVT STEPDOWN

## 2017-08-01 PROCEDURE — 83605 ASSAY OF LACTIC ACID: CPT | Performed by: SURGERY

## 2017-08-01 PROCEDURE — 77030011256 HC DRSG MEPILEX <16IN NO BORD MOLN -A

## 2017-08-01 PROCEDURE — 74011250636 HC RX REV CODE- 250/636: Performed by: PHYSICIAN ASSISTANT

## 2017-08-01 PROCEDURE — 97116 GAIT TRAINING THERAPY: CPT

## 2017-08-01 RX ORDER — LIDOCAINE HYDROCHLORIDE 10 MG/ML
INJECTION, SOLUTION EPIDURAL; INFILTRATION; INTRACAUDAL; PERINEURAL
Status: DISPENSED
Start: 2017-08-01 | End: 2017-08-02

## 2017-08-01 RX ORDER — IPRATROPIUM BROMIDE AND ALBUTEROL SULFATE 2.5; .5 MG/3ML; MG/3ML
SOLUTION RESPIRATORY (INHALATION)
Status: COMPLETED
Start: 2017-08-01 | End: 2017-08-02

## 2017-08-01 RX ORDER — LIDOCAINE HCL/PF 100 MG/5ML
SYRINGE (ML) INTRAVENOUS
Status: DISPENSED
Start: 2017-08-01 | End: 2017-08-02

## 2017-08-01 RX ADMIN — OXYCODONE HYDROCHLORIDE AND ACETAMINOPHEN 2 TABLET: 10; 325 TABLET ORAL at 20:35

## 2017-08-01 RX ADMIN — MORPHINE SULFATE: 5 INJECTION, SOLUTION INTRAVENOUS at 07:33

## 2017-08-01 RX ADMIN — DIPHENHYDRAMINE HYDROCHLORIDE 12.5 MG: 50 INJECTION, SOLUTION INTRAMUSCULAR; INTRAVENOUS at 20:39

## 2017-08-01 RX ADMIN — DULOXETINE HYDROCHLORIDE 60 MG: 60 CAPSULE, DELAYED RELEASE ORAL at 09:41

## 2017-08-01 RX ADMIN — Medication 10 ML: at 22:42

## 2017-08-01 RX ADMIN — Medication 10 ML: at 14:00

## 2017-08-01 RX ADMIN — VITAMIN D, TAB 1000IU (100/BT) 1000 UNITS: 25 TAB at 09:41

## 2017-08-01 RX ADMIN — METOPROLOL TARTRATE 12.5 MG: 25 TABLET ORAL at 09:52

## 2017-08-01 RX ADMIN — HYDROMORPHONE HYDROCHLORIDE 1 MG: 1 INJECTION, SOLUTION INTRAMUSCULAR; INTRAVENOUS; SUBCUTANEOUS at 15:03

## 2017-08-01 RX ADMIN — FUROSEMIDE 20 MG: 20 TABLET ORAL at 23:00

## 2017-08-01 RX ADMIN — POLYETHYLENE GLYCOL 3350 17 G: 17 POWDER, FOR SOLUTION ORAL at 09:53

## 2017-08-01 RX ADMIN — ACETAMINOPHEN 650 MG: 325 TABLET ORAL at 22:56

## 2017-08-01 RX ADMIN — DILTIAZEM HYDROCHLORIDE 30 MG: 30 TABLET, FILM COATED ORAL at 22:40

## 2017-08-01 RX ADMIN — DILTIAZEM HYDROCHLORIDE 30 MG: 30 TABLET, FILM COATED ORAL at 09:41

## 2017-08-01 RX ADMIN — ASPIRIN 81 MG 81 MG: 81 TABLET ORAL at 09:41

## 2017-08-01 RX ADMIN — DILTIAZEM HYDROCHLORIDE 30 MG: 30 TABLET, FILM COATED ORAL at 17:16

## 2017-08-01 RX ADMIN — GABAPENTIN 300 MG: 300 CAPSULE ORAL at 17:16

## 2017-08-01 RX ADMIN — OXYCODONE HYDROCHLORIDE AND ACETAMINOPHEN 2 TABLET: 10; 325 TABLET ORAL at 05:23

## 2017-08-01 RX ADMIN — HYDROMORPHONE HYDROCHLORIDE 1 MG: 1 INJECTION, SOLUTION INTRAMUSCULAR; INTRAVENOUS; SUBCUTANEOUS at 10:01

## 2017-08-01 RX ADMIN — DILTIAZEM HYDROCHLORIDE 30 MG: 30 TABLET, FILM COATED ORAL at 12:39

## 2017-08-01 RX ADMIN — HYDROMORPHONE HYDROCHLORIDE 1 MG: 1 INJECTION, SOLUTION INTRAMUSCULAR; INTRAVENOUS; SUBCUTANEOUS at 00:51

## 2017-08-01 RX ADMIN — DOCUSATE SODIUM 100 MG: 100 CAPSULE, LIQUID FILLED ORAL at 09:53

## 2017-08-01 RX ADMIN — OXYCODONE HYDROCHLORIDE AND ACETAMINOPHEN 2 TABLET: 10; 325 TABLET ORAL at 12:39

## 2017-08-01 RX ADMIN — ATORVASTATIN CALCIUM 40 MG: 40 TABLET, FILM COATED ORAL at 22:41

## 2017-08-01 RX ADMIN — CYANOCOBALAMIN TAB 1000 MCG 1000 MCG: 1000 TAB at 09:42

## 2017-08-01 RX ADMIN — AMIODARONE HYDROCHLORIDE 200 MG: 200 TABLET ORAL at 09:41

## 2017-08-01 RX ADMIN — LOSARTAN POTASSIUM 25 MG: 25 TABLET ORAL at 09:53

## 2017-08-01 RX ADMIN — Medication 250 MG: at 09:42

## 2017-08-01 RX ADMIN — Medication 10 ML: at 06:00

## 2017-08-01 RX ADMIN — METOPROLOL TARTRATE 12.5 MG: 25 TABLET ORAL at 20:38

## 2017-08-01 RX ADMIN — Medication 325 MG: at 09:42

## 2017-08-01 RX ADMIN — DIPHENHYDRAMINE HYDROCHLORIDE 12.5 MG: 50 INJECTION, SOLUTION INTRAMUSCULAR; INTRAVENOUS at 05:24

## 2017-08-01 RX ADMIN — GABAPENTIN 300 MG: 300 CAPSULE ORAL at 22:40

## 2017-08-01 RX ADMIN — GABAPENTIN 300 MG: 300 CAPSULE ORAL at 09:41

## 2017-08-01 RX ADMIN — ENOXAPARIN SODIUM 40 MG: 40 INJECTION SUBCUTANEOUS at 17:16

## 2017-08-01 NOTE — ROUTINE PROCESS
0730 Report received from [facility]. Patient resting comfortably in bed. Morphine PCA infusing. Checked and verified with [place]. Nafcillin med not in patient room. [place] notified pharmacy to bring med. Pharmacy stated is going to take a while for med to be made. 2116 Assessment completed  0940 Received Nafcillin from Pharmacy and restarted infusion. 0950 Morphine PCA D/C. 26ml wasted and verified by [facility].

## 2017-08-01 NOTE — PROGRESS NOTES
PT attempted treatment however, RN requests PT return later as RN administering pain meds and setting up PCA pump. Will return as pt schedule allows. Thank you for this referral. Austin Fox, PT, DPT.

## 2017-08-01 NOTE — HOME CARE
Rec Call from CM about patient going home on IV abx - PICC pending - plan for d/c home on Friday - information sent to HCP to obtain coverage for home IV abx - waiting for call back from HCP with coverage - will plan for Friday d/c - D Rafaelsteff YANEZ

## 2017-08-01 NOTE — PROGRESS NOTES
Problem: Mobility Impaired (Adult and Pediatric)  Goal: *Acute Goals and Plan of Care (Insert Text)  Physical Therapy Goals  Initiated 7/27/2017 and to be accomplished within 7 day(s)  1. Patient will move from supine to sit and sit to supine , scoot up and down and roll side to side in bed with supervision/set-up. 2. Patient will transfer from bed to chair and chair to bed with supervision/set-up using the least restrictive device. 3. Patient will perform sit to stand with supervision/set-up. 4. Patient will ambulate with supervision/set-up for >100 feet with the least restrictive device. 5. Patient will ascend/descend 4 stairs with 1 handrail(s) with supervision/set-up. PHYSICAL THERAPY TREATMENT     Patient: Carlyn Nuñez (40 y.o. male)  Date: 8/1/2017  Diagnosis: Severe sepsis with septic shock  Sepsis ([facility] [address]  DX <principal problem not specified>  Procedure(s) (LRB):  REMOVAL OF LEFT LEG INFECTED GRAFT (Left) 7 Days Post-Op  Precautions: Fall  Chart, physical therapy assessment, plan of care and goals were reviewed. ASSESSMENT:  Pt presents today alert and agreeable to therapy, sitting up in locked recliner. Pt transferred sit to stand with CG/Waylon with additional time as pt transferring from lower height chair. Pt used RW to ambulate 80ft with one standing rest break apprx 10sec. Pt demonstrates mild STEWART which improved with deep breathing during rest break and at conclusion of gait training. Pt transferred stand to sit at EOB then transferred back to supine with supervision and was left resting with call bell by his side and wound vac intact. Pt denied need for further assistance at this time.    Progression toward goals:  [X]      Improving appropriately and progressing toward goals  [ ]      Improving slowly and progressing toward goals  [ ]      Not making progress toward goals and plan of care will be adjusted       PLAN:  Patient continues to benefit from skilled intervention to address the above impairments. Continue treatment per established plan of care. Discharge Recommendations:  Rehab (pt has full flight of steps with no possibility of FFSU)  Further Equipment Recommendations for Discharge:  rolling walker       G-CODES:      Mobility  Current  CI= 1-19%   Goal  CI= 1-19%. The severity rating is based on the Level of Assistance required for Functional Mobility and ADLs. SUBJECTIVE:   Patient stated It's hurting now, all in the front of my thigh down to my knee.  referring to LLE s/p gait training      OBJECTIVE DATA SUMMARY:   Critical Behavior:  Neurologic State: Alert, Appropriate for age  Orientation Level: Oriented X4  Cognition: Appropriate decision making, Appropriate for age attention/concentration, Appropriate safety awareness, Follows commands  Safety/Judgement: Awareness of environment, Fall prevention  Functional Mobility Training:  Bed Mobility:   Supine to Sit: Supervision  Sit to Supine: Stand-by asssistance  Scooting: Supervision   Transfers:  Sit to Stand: Contact guard assistance;Minimum assistance; Additional time (from low chair height)  Stand to Sit: Contact guard assistance      Balance:  Sitting: Intact  Standing: Impaired; With support  Standing - Static: Fair  Standing - Dynamic : Fair  Ambulation/Gait Training:  Distance (ft): 80 Feet (ft) (standing rest break 10sec at 40ft)  Assistive Device: Walker, rolling  Ambulation - Level of Assistance: Stand-by asssistance   Gait Abnormalities: Antalgic;Decreased step clearance; Step to gait   Left Side Weight Bearing: As tolerated   Stance: Left decreased  Speed/Suzanna: Pace decreased (<100 feet/min); Slow  Step Length: Right shortened   Interventions: Verbal cues; Visual/Demos     Pain:  Pt reports 6/10 pain or discomfort prior to treatment. Pt reports 9/10 pain or discomfort post treatment.          Activity Tolerance:   Pt tolerated activity well despite increased pain in LLE; this was pt's most limiting factor in further mobility training  Please refer to the flowsheet for vital signs taken during this treatment.   After treatment:   [ ] Patient left in no apparent distress sitting up in chair  [X] Patient left in no apparent distress in bed  [X] Call bell left within reach  [ ] Nursing notified  [ ] Caregiver present  [ ] Bed alarm activated      Verlan Rinne, PT   Time Calculation: 16 mins

## 2017-08-01 NOTE — PROGRESS NOTES
Noted order for [facility]-IV Abx. As earlier documented, patient signed [facility], cm called, spoke to [facility] liaison, made her aware about IV ABx ordr. She will start processing this IV ABx order to see the coverage.

## 2017-08-01 NOTE — PROGRESS NOTES
ARU/IPR REFERRAL CONTACT NOTE  [id number] [place] [address] for Physical Rehabilitation    RE: Carloslauro Marmolejo     Thank you for the opportunity to review this patient's case for admission to [id number] [place] [address] for Physical Rehabilitation. Based on our pre-admission screening:      [x ] Our Team/Medical Director is following this case. Comments: Spoke with the patient re: ARU. He declined ARU. He states he wants to go home with homecare. Notified Jimenez Winn CM re: this. Again, Thank you for this referral. Should you have any questions please do not hesitate to call. Sincerely,  Abundio Joaquin. Rupa Ellison, [address]   Rupa Ellison RN  Admissions [facility] for Physical Rehabilitation  [phone]

## 2017-08-01 NOTE — ROUTINE PROCESS
1940  Bedside turnover given to me by RN Shayan Lizarraga. Pt is on the stretcher, on cardiac telemetry monitor in stable condition to his baseline. The PCA pump was verified by Shayan Lizarraga and myself. Pt stated when you come back can you please bring me some medicine for pain, I asked him if the PCA pump was not keeping him pain free, he stated every so often the pain is bad and I need something else to help me with the pain. I explained to him that I would be back very shortly with pain medication as well as his other evening medications. The wound drains look good, his bandages are clean. 2025 pain medication and pt c/o pain in his left arm IV, stated mostly it is swollen but when the arm is touched it hurts. Arm is swollen and there is a hard lump. I removed the IV immediately.

## 2017-08-01 NOTE — PROGRESS NOTES
Infectious Disease progress Note    Requested by: Dr Kehinde Cruz    Reason: sepsis, mssa bacteremia    Current abx Prior abx   Nafcillin since 7/28 Pip/tazo, vancomycin 7/24-7/28     Lines:       Assessment :    Meliton Fox is a 59 y.o. male with CAD, severe peripheral arterial insufficiency admitted to [facility] on 7/24/17 with gram positive bacteremia, exposed left groin vascular graft. Clinical picture c/w sepsis (POA) due to MSSA bloodstream infection (positive blood cultures 7/22, 7/24; negative blood cultures 7/28). Most likely source of MSSA bacteremia is infected left groin vascular graft. Patient appropriately underwent removal of infected graft, Repair of superficial femoral artery, left side; Repair of common femoral artery on 7/25/17. Intra op cx: MSSA. Currently he is clinically stable. Controlled pain LLE. Recommendations:    1. cont nafcillin continuous infusion till 9/12/17  2. F/u vascular surgery recommendations  3. PICC line for outpt iv abx - will start making arrangements for outpatient iv abx if no surgery planned for Friday    outpt iv abx orders written    Advance care plan: full code  Primary Decision Maker Name [facility]   Primary Decision Maker Phone Number        Above plan was discussed in details with patient. Will d/w dr Kehinde Cruz. Please call me if any further questions or concerns. Will continue to participate in the care of this patient. subjective:    decreased pain at the recent surgical site left groin. Patient denies headaches, visual disturbances, sore throat, runny nose, earaches, cp, sob, chills, cough, abdominal pain, diarrhea, burning micturition,  or weakness in extremities. He denies back pain/flank pain. Home Medication List    Details   amiodarone (CORDARONE) 200 mg tablet Take 1 Tab by mouth daily.  Indications: VENTRICULAR RATE CONTROL IN ATRIAL FIBRILLATION  Qty: 60 Tab, Refills: 0      clopidogrel (PLAVIX) 75 mg tab Take 1 Tab by mouth daily.  Qty: 90 Tab, Refills: 3      oxyCODONE-acetaminophen (PERCOCET) 5-325 mg per tablet Take 2 Tabs by mouth every four (4) hours as needed. Max Daily Amount: 12 Tabs. Qty: 60 Tab, Refills: 0      gabapentin (NEURONTIN) 100 mg capsule Take 1 Cap by mouth two (2) times a day. Indications: NEUROPATHIC PAIN  Qty: 60 Cap, Refills: 9      aspirin 81 mg chewable tablet Take 1 Tab by mouth daily (with breakfast). Qty: 90 Tab, Refills: 3      cholecalciferol (VITAMIN D3) 1,000 unit tablet Take 1 Tab by mouth daily. Qty: 90 Tab, Refills: 3      losartan (COZAAR) 25 mg tablet Take 1 Tab by mouth daily. Indications: Chronic Heart Failure, hypertension  Qty: 90 Tab, Refills: 2      ascorbic acid, vitamin C, (VITAMIN C) 250 mg tablet Take 1 Tab by mouth daily (with breakfast). Qty: 90 Tab, Refills: 2      DULoxetine (CYMBALTA) 60 mg capsule Take 1 Cap by mouth daily. Qty: 90 Cap, Refills: 2      zinc sulfate (ZINCATE) 220 (50) mg capsule Take 1 Cap by mouth daily. Qty: 90 Cap, Refills: 0      ferrous sulfate 325 mg (65 mg iron) tablet Take 1 Tab by mouth daily (with breakfast). Qty: 90 Tab, Refills: 3      atorvastatin (LIPITOR) 40 mg tablet Take 1 Tab by mouth nightly. Indications: DYSLIPIDEMIA  Qty: 90 Tab, Refills: 3      metoprolol tartrate (LOPRESSOR) 25 mg tablet Take 0.5 Tabs by mouth every twelve (12) hours. Indications: hypertension, VENTRICULAR RATE CONTROL IN ATRIAL FIBRILLATION  Qty: 30 Tab, Refills: 6      dilTIAZem (CARDIZEM) 30 mg tablet Take 1 Tab by mouth Before breakfast, lunch, dinner and at bedtime. Indications: hypertension  Qty: 120 Tab, Refills: 6      cyanocobalamin (VITAMIN B-12) 1,000 mcg tablet Take 1 Tab by mouth daily.   Qty: 90 Tab, Refills: 3             Current Facility-Administered Medications   Medication Dose Route Frequency    gabapentin (NEURONTIN) capsule 300 mg  300 mg Oral TID    oxyCODONE-acetaminophen (PERCOCET 10)  mg per tablet 1-2 Tab  1-2 Tab Oral Q4H PRN    polyethylene glycol (MIRALAX) packet 17 g  17 g Oral DAILY    nafcillin (NALLPEN) 12 g in 0.9% sodium chloride 1,000 mL infusion  12 g IntraVENous CONTINUOUS    0.9% sodium chloride infusion 250 mL  250 mL IntraVENous PRN    sodium chloride (NS) flush 5-10 mL  5-10 mL IntraVENous Q8H    sodium chloride (NS) flush 5-10 mL  5-10 mL IntraVENous PRN    morphine (PF)  mg/30 ml   IntraVENous TITRATE    naloxone (NARCAN) injection 0.1 mg  0.1 mg IntraVENous PRN    atorvastatin (LIPITOR) tablet 40 mg  40 mg Oral QHS    acetaminophen (TYLENOL) tablet 650 mg  650 mg Oral Q4H PRN    HYDROmorphone (PF) (DILAUDID) injection 1 mg  1 mg IntraVENous Q3H PRN    diphenhydrAMINE (BENADRYL) injection 12.5 mg  12.5 mg IntraVENous Q4H PRN    ondansetron (ZOFRAN) injection 4 mg  4 mg IntraVENous Q4H PRN    docusate sodium (COLACE) capsule 100 mg  100 mg Oral DAILY    enoxaparin (LOVENOX) injection 40 mg  40 mg SubCUTAneous Q24H    amiodarone (CORDARONE) tablet 200 mg  200 mg Oral DAILY    ascorbic acid (vitamin C) (VITAMIN C) tablet 250 mg  250 mg Oral DAILY    aspirin chewable tablet 81 mg  81 mg Oral DAILY    cholecalciferol (VITAMIN D3) tablet 1,000 Units  1,000 Units Oral DAILY    cyanocobalamin tablet 1,000 mcg  1,000 mcg Oral DAILY    dilTIAZem (CARDIZEM) IR tablet 30 mg  30 mg Oral QID    DULoxetine (CYMBALTA) capsule 60 mg  60 mg Oral DAILY    ferrous sulfate tablet 325 mg  1 Tab Oral DAILY WITH BREAKFAST    metoprolol tartrate (LOPRESSOR) tablet 12.5 mg  12.5 mg Oral Q12H    losartan (COZAAR) tablet 25 mg  25 mg Oral DAILY       Allergies: Review of patient's allergies indicates no known allergies.     Temp (24hrs), Av.6 °F (36.4 °C), Min:97.4 °F (36.3 °C), Max:97.9 °F (36.6 °C)    Visit Vitals    /79 (BP 1 Location: Right arm, BP Patient Position: Sitting)    Pulse 65    Temp 97.7 °F (36.5 °C)    Resp 20    Ht 5' 10\" (1.778 m)    Wt 75.1 kg (165 lb 9.1 oz)    SpO2 92%  Comment: room air    BMI 23.76 kg/m2       ROS: 12 point ROS obtained in details. Pertinent positives as mentioned in HPI,   otherwise negative    Physical Exam:    General: Well developed, well nourished male laying on the bed AAOx3 in no acute distress. General:   awake alert and oriented   HEENT:  Normocephalic, atraumatic, PERRL, EOMI, no scleral icterus or pallor; no conjunctival hemmohage;  nasal and oral mucous are moist and without evidence of lesions. No thrush. Neck supple, no bruits. Lymph Nodes:   no cervical, axillary or inguinal adenopathy   Lungs:   non-labored, bilaterally clear to auscultation- no crackles wheezes rales or rhonchi   Heart:  RRR, s1 and s2; no  rubs or gallops, no edema, + pedal pulses   Abdomen:  soft, non-distended, active bowel sounds, no hepatomegaly, no splenomegaly. Non-tender   Genitourinary:  deferred   Extremities:   no clubbing, cyanosis; no joint effusions or swelling; wound vac left groin; muscle mass appropriate for age, LLE cool to touch. Tenderness on palpation of shin LLE   Neurologic:  No gross focal sensory abnormalities; 5/5 muscle strength to upper and lower extremities. Speech appropriate.  Cranial nerves intact                        Skin:  Surgical changes left groin   Back:  no spinal or paraspinal muscle tenderness or rigidity, no CVA tenderness     Psychiatric:  No suicidal or homicidal ideations, appropriate mood and affect         Labs: Results:   Chemistry Recent Labs      08/01/17 0337 07/31/17   0505  07/30/17   0350   GLU  89  94  92   NA  133*  135*  134*   K  3.6  3.6  3.3*   CL  100  100  101   CO2  26  28  25   BUN  8  6*  6*   CREA  0.63  0.51*  0.57*   CA  8.2*  8.3*  8.1*   AGAP  7  7  8   BUCR  13  12  11*   AP  791*  854*  924*   TP  6.5  6.4  5.9*   ALB  1.9*  1.9*  1.8*   GLOB  4.6*  4.5*  4.1*   AGRAT  0.4*  0.4*  0.4*      CBC w/Diff Recent Labs      08/01/17 0337 07/31/17   0505  07/30/17   0350   WBC  13.8*  13.8*  15.2*   RBC  3.08* 3.23*  3.25*   HGB  8.7*  9.2*  9.4*   HCT  26.1*  27.2*  27.0*   PLT  502*  466*  465*   GRANS  75*  72  73   LYMPH  15*  17*  16*   EOS  1  2  2      Microbiology No results for input(s): CULT in the last 72 hours.        RADIOLOGY:    All available imaging studies/reports in [facility] for this admission were reviewed    Dr. Cheryl Hagen, Infectious Disease Specialist  [phone]  August 1, 2017  4:21 PM

## 2017-08-01 NOTE — PROGRESS NOTES
Pt sitting in chair. Pain 6/10, states pain is stable. States doing well and has no new complaints. Ambulated with PT yesterday. States did well. WBCs remain elevated at 13.8. Afebrile. On ABX per ID. Discussed will continue to monitor. Will hopefully be able to hold off on surgery. Pt agrees. Possible Friday ax-pop bypass if worsening pain, but if continues to improve and off iv pain meds could be possible to discharge home later this week. Discussed will DC PCA at this time and try PO pain meds to better assess his pain level during admission. If does well pain wise can start Coumadin and possible discharge Thursday or Friday.      Everett Hatillo  [phone]

## 2017-08-01 NOTE — PROGRESS NOTES
Problem: Self Care Deficits Care Plan (Adult)  Goal: *Acute Goals and Plan of Care (Insert Text)  Occupational Therapy Goals  Initiated 7/27/2017 within 7 day(s). 1. Patient will perform LE bathing with supervision using AE   2. Patient will perform toilet transfers/toileting with contact guard assist.  3. Patient will perform lower body dressing with supervision using AE   Outcome: Progressing Towards Goal  OCCUPATIONAL THERAPY TREATMENT     Patient: Meliton Fox (69 y.o. male)  Date: 8/1/2017  Diagnosis: Severe sepsis with septic shock  Sepsis ([facility] [address]  DX <principal problem not specified>  Procedure(s) (LRB):  REMOVAL OF LEFT LEG INFECTED GRAFT (Left) 7 Days Post-Op  Precautions: Fall  Chart, occupational therapy assessment, plan of care, and goals were reviewed. ASSESSMENT:  Pt presented supine in bed agreeable to skilled OT services this date. Pt agreed to perform toileting task. Pt moved supine <> EOB Supervision. Pt moved seated EOB <> stand Supervision. Pt utilzed FWW to perform functional mobility bedside <> BSC CGA. Pt performed toileting task CGA. Pt was left comfortable in bed with call bell within reach  Progression toward goals:  [X]          Improving appropriately and progressing toward goals  [ ]          Improving slowly and progressing toward goals  [ ]          Not making progress toward goals and plan of care will be adjusted       PLAN:  Patient continues to benefit from skilled intervention to address the above impairments. Continue treatment per established plan of care. Discharge Recommendations:  Home Health  Further Equipment Recommendations for Discharge:  bedside commode and walker      G-CODES:      Self Care  Current  CI= 1-19%. The severity rating is based on the Level of Assistance required for Functional Mobility and ADLs. SUBJECTIVE:   Patient stated I could use the bathroom.       OBJECTIVE DATA SUMMARY:   Cognitive/Behavioral Status:  Neurologic State: Alert, Appropriate for age  Orientation Level: Oriented X4  Cognition: Appropriate decision making, Appropriate for age attention/concentration, Appropriate safety awareness, Follows commands  Safety/Judgement: Awareness of environment, Fall prevention     Functional Mobility and Transfers for ADLs:              Bed Mobility:     Supine to Sit: Supervision  Sit to Supine: Supervision  Scooting: Supervision              Transfers:  Sit to Stand: Contact guard assistance              Toilet Transfer : Contact guard assistance                Balance:  Sitting: Intact  Standing: Impaired; With support  Standing - Static: Fair  Standing - Dynamic : Fair     ADL Intervention:     Toileting  Toileting Assistance: Contact guard assistance  Adaptive Equipment: Juliana Eckert; Other (comment) (BSC)     Pain:  Pt reports 8/10 pain or discomfort prior to treatment. Pt reports 8/10 pain or discomfort post treatment. Activity Tolerance:    Fair+     Please refer to the flowsheet for vital signs taken during this treatment.   After treatment:   [ ]  Patient left in no apparent distress sitting up in chair  [X]  Patient left in no apparent distress in bed  [X]  Call bell left within reach  [X]  Nursing notified  [ ]  Caregiver present  [ ]  Bed alarm activated           LEA Gamez  Time Calculation: 17 mins

## 2017-08-01 NOTE — HOME CARE
Rec call back from Ayush Smith at [facility] - Pt has 100% coverage for home IV abx thru November of 2017 - HCP can do all of IV abx case - D Rafael YANEZ

## 2017-08-02 ENCOUNTER — TELEPHONE (OUTPATIENT)
Dept: VASCULAR SURGERY | Age: 57
End: 2017-08-02

## 2017-08-02 ENCOUNTER — APPOINTMENT (OUTPATIENT)
Dept: GENERAL RADIOLOGY | Age: 57
DRG: 252 | End: 2017-08-02
Attending: PHYSICIAN ASSISTANT
Payer: COMMERCIAL

## 2017-08-02 ENCOUNTER — APPOINTMENT (OUTPATIENT)
Dept: INTERVENTIONAL RADIOLOGY/VASCULAR | Age: 57
DRG: 252 | End: 2017-08-02
Attending: INTERNAL MEDICINE
Payer: COMMERCIAL

## 2017-08-02 LAB
ALBUMIN SERPL BCP-MCNC: 2 G/DL (ref 3.4–5)
ALBUMIN/GLOB SERPL: 0.4 {RATIO} (ref 0.8–1.7)
ALP SERPL-CCNC: 696 U/L (ref 45–117)
ALT SERPL-CCNC: 27 U/L (ref 16–61)
ANION GAP BLD CALC-SCNC: 8 MMOL/L (ref 3–18)
APPEARANCE UR: CLEAR
ARTERIAL PATENCY WRIST A: YES
AST SERPL W P-5'-P-CCNC: 26 U/L (ref 15–37)
ATRIAL RATE: 93 BPM
ATRIAL RATE: 98 BPM
BACTERIA URNS QL MICRO: NEGATIVE /HPF
BASE DEFICIT BLD-SCNC: 2 MMOL/L
BASOPHILS # BLD AUTO: 0 K/UL (ref 0–0.1)
BASOPHILS # BLD AUTO: 0 K/UL (ref 0–0.1)
BASOPHILS # BLD: 0 % (ref 0–2)
BASOPHILS # BLD: 0 % (ref 0–2)
BDY SITE: ABNORMAL
BILIRUB SERPL-MCNC: 1.3 MG/DL (ref 0.2–1)
BILIRUB UR QL: NEGATIVE
BNP SERPL-MCNC: ABNORMAL PG/ML (ref 0–900)
BODY TEMPERATURE: 95.7
BUN SERPL-MCNC: 8 MG/DL (ref 7–18)
BUN/CREAT SERPL: 11 (ref 12–20)
CA-I SERPL-SCNC: 1.12 MMOL/L (ref 1.12–1.32)
CALCIUM SERPL-MCNC: 8.3 MG/DL (ref 8.5–10.1)
CALCULATED P AXIS, ECG09: 41 DEGREES
CALCULATED P AXIS, ECG09: 52 DEGREES
CALCULATED R AXIS, ECG10: 44 DEGREES
CALCULATED R AXIS, ECG10: 56 DEGREES
CALCULATED T AXIS, ECG11: -154 DEGREES
CALCULATED T AXIS, ECG11: 27 DEGREES
CHLORIDE SERPL-SCNC: 100 MMOL/L (ref 100–108)
CK MB CFR SERPL CALC: 2.8 % (ref 0–4)
CK MB SERPL-MCNC: 3 NG/ML (ref 5–25)
CK SERPL-CCNC: 109 U/L (ref 39–308)
CO2 SERPL-SCNC: 27 MMOL/L (ref 21–32)
COLOR UR: YELLOW
CREAT SERPL-MCNC: 0.76 MG/DL (ref 0.6–1.3)
DIAGNOSIS, 93000: NORMAL
DIAGNOSIS, 93000: NORMAL
DIFFERENTIAL METHOD BLD: ABNORMAL
DIFFERENTIAL METHOD BLD: ABNORMAL
EOSINOPHIL # BLD: 0 K/UL (ref 0–0.4)
EOSINOPHIL # BLD: 0.2 K/UL (ref 0–0.4)
EOSINOPHIL NFR BLD: 0 % (ref 0–5)
EOSINOPHIL NFR BLD: 1 % (ref 0–5)
EPITH CASTS URNS QL MICRO: NORMAL /LPF (ref 0–5)
ERYTHROCYTE [DISTWIDTH] IN BLOOD BY AUTOMATED COUNT: 18.7 % (ref 11.6–14.5)
ERYTHROCYTE [DISTWIDTH] IN BLOOD BY AUTOMATED COUNT: 18.9 % (ref 11.6–14.5)
GAS FLOW.O2 O2 DELIVERY SYS: ABNORMAL L/MIN
GLOBULIN SER CALC-MCNC: 4.6 G/DL (ref 2–4)
GLUCOSE BLD STRIP.AUTO-MCNC: 81 MG/DL (ref 70–110)
GLUCOSE BLD STRIP.AUTO-MCNC: 88 MG/DL (ref 70–110)
GLUCOSE BLD STRIP.AUTO-MCNC: 89 MG/DL (ref 70–110)
GLUCOSE BLD STRIP.AUTO-MCNC: 99 MG/DL (ref 70–110)
GLUCOSE SERPL-MCNC: 84 MG/DL (ref 74–99)
GLUCOSE UR STRIP.AUTO-MCNC: NEGATIVE MG/DL
HCO3 BLD-SCNC: 23.1 MMOL/L (ref 22–26)
HCT VFR BLD AUTO: 24.3 % (ref 36–48)
HCT VFR BLD AUTO: 29.8 % (ref 36–48)
HGB BLD-MCNC: 8.1 G/DL (ref 13–16)
HGB BLD-MCNC: 9.8 G/DL (ref 13–16)
HGB UR QL STRIP: NEGATIVE
INR PPP: 1.2 (ref 0.8–1.2)
KETONES UR QL STRIP.AUTO: NEGATIVE MG/DL
LACTATE SERPL-SCNC: 1.2 MMOL/L (ref 0.4–2)
LACTATE SERPL-SCNC: 4.6 MMOL/L (ref 0.4–2)
LEUKOCYTE ESTERASE UR QL STRIP.AUTO: NEGATIVE
LYMPHOCYTES # BLD AUTO: 11 % (ref 21–52)
LYMPHOCYTES # BLD AUTO: 22 % (ref 21–52)
LYMPHOCYTES # BLD: 1.7 K/UL (ref 0.9–3.6)
LYMPHOCYTES # BLD: 3.5 K/UL (ref 0.9–3.6)
MAGNESIUM SERPL-MCNC: 2 MG/DL (ref 1.6–2.6)
MAGNESIUM SERPL-MCNC: 2.3 MG/DL (ref 1.6–2.6)
MCH RBC QN AUTO: 28.5 PG (ref 24–34)
MCH RBC QN AUTO: 28.7 PG (ref 24–34)
MCHC RBC AUTO-ENTMCNC: 32.9 G/DL (ref 31–37)
MCHC RBC AUTO-ENTMCNC: 33.3 G/DL (ref 31–37)
MCV RBC AUTO: 85.6 FL (ref 74–97)
MCV RBC AUTO: 87.4 FL (ref 74–97)
MONOCYTES # BLD: 1.1 K/UL (ref 0.05–1.2)
MONOCYTES # BLD: 1.3 K/UL (ref 0.05–1.2)
MONOCYTES NFR BLD AUTO: 7 % (ref 3–10)
MONOCYTES NFR BLD AUTO: 8 % (ref 3–10)
NEUTS SEG # BLD: 11.2 K/UL (ref 1.8–8)
NEUTS SEG # BLD: 12.6 K/UL (ref 1.8–8)
NEUTS SEG NFR BLD AUTO: 69 % (ref 40–73)
NEUTS SEG NFR BLD AUTO: 82 % (ref 40–73)
NITRITE UR QL STRIP.AUTO: NEGATIVE
O2/TOTAL GAS SETTING VFR VENT: 50 %
P-R INTERVAL, ECG05: 204 MS
P-R INTERVAL, ECG05: 230 MS
PCO2 BLD: 38.4 MMHG (ref 35–45)
PEEP RESPIRATORY: 8 CMH2O
PH BLD: 7.38 [PH] (ref 7.35–7.45)
PH UR STRIP: 7 [PH] (ref 5–8)
PHOSPHATE SERPL-MCNC: 3.9 MG/DL (ref 2.5–4.9)
PIP ISTAT,IPIP: 16
PLATELET # BLD AUTO: 474 K/UL (ref 135–420)
PLATELET # BLD AUTO: 614 K/UL (ref 135–420)
PMV BLD AUTO: 9.5 FL (ref 9.2–11.8)
PMV BLD AUTO: 9.6 FL (ref 9.2–11.8)
PO2 BLD: 65 MMHG (ref 80–100)
POTASSIUM SERPL-SCNC: 3.7 MMOL/L (ref 3.5–5.5)
PROT SERPL-MCNC: 6.6 G/DL (ref 6.4–8.2)
PROT UR STRIP-MCNC: NEGATIVE MG/DL
PROTHROMBIN TIME: 15 SEC (ref 11.5–15.2)
Q-T INTERVAL, ECG07: 334 MS
Q-T INTERVAL, ECG07: 538 MS
QRS DURATION, ECG06: 90 MS
QRS DURATION, ECG06: 96 MS
QTC CALCULATION (BEZET), ECG08: 426 MS
QTC CALCULATION (BEZET), ECG08: 668 MS
RBC # BLD AUTO: 2.84 M/UL (ref 4.7–5.5)
RBC # BLD AUTO: 3.41 M/UL (ref 4.7–5.5)
RBC #/AREA URNS HPF: NEGATIVE /HPF (ref 0–5)
SAO2 % BLD: 94 % (ref 92–97)
SERVICE CMNT-IMP: ABNORMAL
SODIUM SERPL-SCNC: 135 MMOL/L (ref 136–145)
SP GR UR REFRACTOMETRY: 1.01 (ref 1–1.03)
SPECIMEN TYPE: ABNORMAL
SPONTANEOUS TIMED, IST: YES
T4 FREE SERPL-MCNC: 1.1 NG/DL (ref 0.7–1.5)
TOTAL RESP. RATE, ITRR: 27
TROPONIN I SERPL-MCNC: 0.03 NG/ML (ref 0–0.04)
TSH SERPL DL<=0.05 MIU/L-ACNC: 6.74 UIU/ML (ref 0.36–3.74)
UROBILINOGEN UR QL STRIP.AUTO: 0.2 EU/DL (ref 0.2–1)
VENTRICULAR RATE, ECG03: 93 BPM
VENTRICULAR RATE, ECG03: 98 BPM
WBC # BLD AUTO: 15.4 K/UL (ref 4.6–13.2)
WBC # BLD AUTO: 16.2 K/UL (ref 4.6–13.2)
WBC URNS QL MICRO: NORMAL /HPF (ref 0–4)

## 2017-08-02 PROCEDURE — 74011000250 HC RX REV CODE- 250: Performed by: PHYSICIAN ASSISTANT

## 2017-08-02 PROCEDURE — 82803 BLOOD GASES ANY COMBINATION: CPT

## 2017-08-02 PROCEDURE — 74011250636 HC RX REV CODE- 250/636: Performed by: PHYSICIAN ASSISTANT

## 2017-08-02 PROCEDURE — 36415 COLL VENOUS BLD VENIPUNCTURE: CPT | Performed by: SURGERY

## 2017-08-02 PROCEDURE — 77001 FLUOROGUIDE FOR VEIN DEVICE: CPT

## 2017-08-02 PROCEDURE — 83605 ASSAY OF LACTIC ACID: CPT | Performed by: SURGERY

## 2017-08-02 PROCEDURE — 87040 BLOOD CULTURE FOR BACTERIA: CPT | Performed by: PHYSICIAN ASSISTANT

## 2017-08-02 PROCEDURE — 74011000250 HC RX REV CODE- 250

## 2017-08-02 PROCEDURE — 94660 CPAP INITIATION&MGMT: CPT

## 2017-08-02 PROCEDURE — 83735 ASSAY OF MAGNESIUM: CPT | Performed by: SURGERY

## 2017-08-02 PROCEDURE — 65660000000 HC RM CCU STEPDOWN

## 2017-08-02 PROCEDURE — 84443 ASSAY THYROID STIM HORMONE: CPT | Performed by: PHYSICIAN ASSISTANT

## 2017-08-02 PROCEDURE — 74011250637 HC RX REV CODE- 250/637: Performed by: PHYSICIAN ASSISTANT

## 2017-08-02 PROCEDURE — 74011250636 HC RX REV CODE- 250/636

## 2017-08-02 PROCEDURE — 80053 COMPREHEN METABOLIC PANEL: CPT | Performed by: SURGERY

## 2017-08-02 PROCEDURE — 83880 ASSAY OF NATRIURETIC PEPTIDE: CPT | Performed by: SURGERY

## 2017-08-02 PROCEDURE — 74011250636 HC RX REV CODE- 250/636: Performed by: INTERNAL MEDICINE

## 2017-08-02 PROCEDURE — 74011250637 HC RX REV CODE- 250/637: Performed by: SURGERY

## 2017-08-02 PROCEDURE — 82550 ASSAY OF CK (CPK): CPT | Performed by: SURGERY

## 2017-08-02 PROCEDURE — 71010 XR CHEST PORT: CPT

## 2017-08-02 PROCEDURE — 02HV33Z INSERTION OF INFUSION DEVICE INTO SUPERIOR VENA CAVA, PERCUTANEOUS APPROACH: ICD-10-PCS | Performed by: RADIOLOGY

## 2017-08-02 PROCEDURE — 85025 COMPLETE CBC W/AUTO DIFF WBC: CPT | Performed by: SURGERY

## 2017-08-02 PROCEDURE — 83605 ASSAY OF LACTIC ACID: CPT | Performed by: PHYSICIAN ASSISTANT

## 2017-08-02 PROCEDURE — 85610 PROTHROMBIN TIME: CPT | Performed by: PHYSICIAN ASSISTANT

## 2017-08-02 PROCEDURE — 81001 URINALYSIS AUTO W/SCOPE: CPT | Performed by: PHYSICIAN ASSISTANT

## 2017-08-02 PROCEDURE — 84100 ASSAY OF PHOSPHORUS: CPT | Performed by: SURGERY

## 2017-08-02 PROCEDURE — 84439 ASSAY OF FREE THYROXINE: CPT | Performed by: PHYSICIAN ASSISTANT

## 2017-08-02 PROCEDURE — 82330 ASSAY OF CALCIUM: CPT | Performed by: SURGERY

## 2017-08-02 PROCEDURE — 82962 GLUCOSE BLOOD TEST: CPT

## 2017-08-02 PROCEDURE — 74011000250 HC RX REV CODE- 250: Performed by: SURGERY

## 2017-08-02 PROCEDURE — 36600 WITHDRAWAL OF ARTERIAL BLOOD: CPT

## 2017-08-02 RX ORDER — WARFARIN SODIUM 5 MG/1
5 TABLET ORAL ONCE
Status: COMPLETED | OUTPATIENT
Start: 2017-08-02 | End: 2017-08-02

## 2017-08-02 RX ORDER — MAGNESIUM SULFATE 100 %
4 CRYSTALS MISCELLANEOUS AS NEEDED
Status: DISCONTINUED | OUTPATIENT
Start: 2017-08-02 | End: 2017-08-07 | Stop reason: HOSPADM

## 2017-08-02 RX ORDER — POTASSIUM CHLORIDE 1.5 G/1.77G
20 POWDER, FOR SOLUTION ORAL 2 TIMES DAILY WITH MEALS
Status: DISCONTINUED | OUTPATIENT
Start: 2017-08-02 | End: 2017-08-07 | Stop reason: HOSPADM

## 2017-08-02 RX ORDER — FAMOTIDINE 20 MG/50ML
20 INJECTION, SOLUTION INTRAVENOUS EVERY 12 HOURS
Status: DISCONTINUED | OUTPATIENT
Start: 2017-08-02 | End: 2017-08-02 | Stop reason: CLARIF

## 2017-08-02 RX ORDER — CEFAZOLIN SODIUM 2 G/50ML
2 SOLUTION INTRAVENOUS EVERY 8 HOURS
Status: DISCONTINUED | OUTPATIENT
Start: 2017-08-02 | End: 2017-08-07 | Stop reason: HOSPADM

## 2017-08-02 RX ORDER — FUROSEMIDE 20 MG/1
20 TABLET ORAL DAILY
Status: DISCONTINUED | OUTPATIENT
Start: 2017-08-02 | End: 2017-08-07 | Stop reason: HOSPADM

## 2017-08-02 RX ORDER — DEXTROSE 50 % IN WATER (D50W) INTRAVENOUS SYRINGE
25-50 AS NEEDED
Status: DISCONTINUED | OUTPATIENT
Start: 2017-08-02 | End: 2017-08-07 | Stop reason: HOSPADM

## 2017-08-02 RX ORDER — FUROSEMIDE 10 MG/ML
INJECTION INTRAMUSCULAR; INTRAVENOUS
Status: COMPLETED
Start: 2017-08-02 | End: 2017-08-02

## 2017-08-02 RX ADMIN — POTASSIUM CHLORIDE 20 MEQ: 1.5 POWDER, FOR SOLUTION ORAL at 10:12

## 2017-08-02 RX ADMIN — METOPROLOL TARTRATE 12.5 MG: 25 TABLET ORAL at 21:13

## 2017-08-02 RX ADMIN — FAMOTIDINE 20 MG: 10 INJECTION, SOLUTION INTRAVENOUS at 01:47

## 2017-08-02 RX ADMIN — LOSARTAN POTASSIUM 25 MG: 25 TABLET ORAL at 10:16

## 2017-08-02 RX ADMIN — METOPROLOL TARTRATE 12.5 MG: 25 TABLET ORAL at 10:13

## 2017-08-02 RX ADMIN — DILTIAZEM HYDROCHLORIDE 30 MG: 30 TABLET, FILM COATED ORAL at 21:12

## 2017-08-02 RX ADMIN — Medication 10 ML: at 21:19

## 2017-08-02 RX ADMIN — DULOXETINE HYDROCHLORIDE 60 MG: 60 CAPSULE, DELAYED RELEASE ORAL at 10:17

## 2017-08-02 RX ADMIN — ENOXAPARIN SODIUM 40 MG: 40 INJECTION SUBCUTANEOUS at 17:14

## 2017-08-02 RX ADMIN — GABAPENTIN 300 MG: 300 CAPSULE ORAL at 17:15

## 2017-08-02 RX ADMIN — OXYCODONE HYDROCHLORIDE AND ACETAMINOPHEN 2 TABLET: 10; 325 TABLET ORAL at 13:19

## 2017-08-02 RX ADMIN — ATORVASTATIN CALCIUM 40 MG: 40 TABLET, FILM COATED ORAL at 21:12

## 2017-08-02 RX ADMIN — NAFCILLIN 12 G: 2 INJECTION, POWDER, FOR SOLUTION INTRAMUSCULAR; INTRAVENOUS at 03:48

## 2017-08-02 RX ADMIN — OXYCODONE HYDROCHLORIDE AND ACETAMINOPHEN 2 TABLET: 10; 325 TABLET ORAL at 21:13

## 2017-08-02 RX ADMIN — ASPIRIN 81 MG 81 MG: 81 TABLET ORAL at 10:16

## 2017-08-02 RX ADMIN — CEFAZOLIN SODIUM 2 G: 2 SOLUTION INTRAVENOUS at 17:26

## 2017-08-02 RX ADMIN — FUROSEMIDE 20 MG: 20 TABLET ORAL at 10:16

## 2017-08-02 RX ADMIN — GABAPENTIN 300 MG: 300 CAPSULE ORAL at 21:12

## 2017-08-02 RX ADMIN — IPRATROPIUM BROMIDE AND ALBUTEROL SULFATE 6 ML: 2.5; .5 SOLUTION RESPIRATORY (INHALATION) at 00:00

## 2017-08-02 RX ADMIN — CYANOCOBALAMIN TAB 1000 MCG 1000 MCG: 1000 TAB at 10:16

## 2017-08-02 RX ADMIN — Medication 325 MG: at 10:22

## 2017-08-02 RX ADMIN — FUROSEMIDE 20 MG: 10 INJECTION, SOLUTION INTRAMUSCULAR; INTRAVENOUS at 00:04

## 2017-08-02 RX ADMIN — WARFARIN SODIUM 5 MG: 5 TABLET ORAL at 17:25

## 2017-08-02 RX ADMIN — DILTIAZEM HYDROCHLORIDE 30 MG: 30 TABLET, FILM COATED ORAL at 13:19

## 2017-08-02 RX ADMIN — Medication 10 ML: at 06:31

## 2017-08-02 RX ADMIN — CEFAZOLIN SODIUM 2 G: 2 SOLUTION INTRAVENOUS at 10:18

## 2017-08-02 RX ADMIN — POLYETHYLENE GLYCOL 3350 17 G: 17 POWDER, FOR SOLUTION ORAL at 10:17

## 2017-08-02 RX ADMIN — GABAPENTIN 300 MG: 300 CAPSULE ORAL at 10:16

## 2017-08-02 RX ADMIN — Medication 10 ML: at 17:26

## 2017-08-02 RX ADMIN — POTASSIUM CHLORIDE 20 MEQ: 1.5 POWDER, FOR SOLUTION ORAL at 17:15

## 2017-08-02 RX ADMIN — DOCUSATE SODIUM 100 MG: 100 CAPSULE, LIQUID FILLED ORAL at 10:12

## 2017-08-02 RX ADMIN — OXYCODONE HYDROCHLORIDE AND ACETAMINOPHEN 2 TABLET: 10; 325 TABLET ORAL at 17:15

## 2017-08-02 RX ADMIN — DILTIAZEM HYDROCHLORIDE 30 MG: 30 TABLET, FILM COATED ORAL at 17:25

## 2017-08-02 RX ADMIN — Medication 250 MG: at 10:17

## 2017-08-02 RX ADMIN — AMIODARONE HYDROCHLORIDE 200 MG: 200 TABLET ORAL at 10:17

## 2017-08-02 RX ADMIN — DILTIAZEM HYDROCHLORIDE 30 MG: 30 TABLET, FILM COATED ORAL at 10:17

## 2017-08-02 RX ADMIN — VITAMIN D, TAB 1000IU (100/BT) 1000 UNITS: 25 TAB at 10:17

## 2017-08-02 NOTE — PROGRESS NOTES
Patient chart reviewed. Talked with Shannan Early PA-C from pulmonology and she reported to hold patient for OT today and to follow up tomorrow. Will hold today and follow up when medically stable, thank you.     Susannah Runner OTR/IWONA

## 2017-08-02 NOTE — ROUTINE PROCESS
1930  Bedside turnover given to me by BEAU Joaquin. Pt was taken off of the morphine pca pump tonight. He has been taking percocet and or dilaudid for pain now. His antibiotics are running. Pt c/o feeling congested and not being able to cough anything up. I advised him last night to use his incentive spirometer to exercise his lungs due to his not being very ambulatory. Pt ambulates to the bedside commode and with physical therapy otherwise due to the wound vacs he remains in bed. Shortly after bedside turnover medications for the evening were given as well as two percocet for pain. 2100  Pt was given a new ice water and he is a little bit diaphoretic. His temperature was checked and it was 97.3, I advised him to try and get some rest and to use his call bell if he had further shortness of breath. 2302  Pt pulled call bell out of the wall accidentally, he stated \"I can't breath\". He was placed on the 2 lpm Nasal cannula and his vitals were checked. His oxygen saturation was 87% , his pulse was tachycardic at 103 and his blood pressure elevated at 177/80. His respirations were  Rapid response called. Pt pulled out his one IV accidentally. He was very diaphoretic therefore it was loose with the tape being moist.    RN Danna Councilman, BEAU Mccabe and BEAU Collins worked on IV access. Eventually two IVs were placed, one in the left hand and one in the left arm. Both worked well . Labs were drawn and an x ray completed. [facility] placed a transfer order to ICU. I phoned BEAU Romero in the ICU and gave report. Patient was then transported to Main ICU by respiratory therapist on [facility], and myself. During transport patient asked to use a urinal, he had been given 20 mg of lasix during the rapid response. Pt turned over to Jayla, on cardiac telemetry monitor looking much better. His diaphoresis decreased and the shaking and twitching slowed down,.  Still present however not nearly as bad as they were. /  His face began to get color back into it.   He was able to talk to me in full sentences./

## 2017-08-02 NOTE — PROGRESS NOTES
0100 Pt arrived from CVT stepdown in ICU bed on Bipap. Monitor attached and pt oriented to unit. Assessment and bath completed. Voided 750ml in urinal. Wound vac intact to LES.

## 2017-08-02 NOTE — CONSULTS
[facility]  Pulmonary, Critical Care, and Sleep Medicine      Name: Iesha Arenas MRN: [id number]   : 1960 Hospital: [address] [place] [address]   Date: 2017          Critical Care Initial Patient Consult    Requesting MD: Dr. Fredrick Badillo              Reason for CC Consult: Acute Hypoxic Respiratory Failure    IMPRESSION:   · Acute Hypoxic Respiratory Failure- fluid overload (decompensated HF), acute worsening/exacerbation of pulmonary HTN (CXR-bilateral pulmonary edema) vs. Sepsis (PNA,UTI) - Unlikely, Acute PE( although high risk with multiple vascular complications) or NSTEMI (negative cardiac enzymes)   · Possible Sepsis- Lactic acidosis, Leukocytosis (no left shift), afebrile   · MSSA Bacteremia- on continuous nafcillin   · Acute Decompensated Diastolic CHF- recent echo at [facility] 17- EF 98% with diastolic dysfunction and PAH, BNP 33,000- receiving 1 liter IVF/ day with nafcillin infusion   · Hypertensive- SOB, elevated BNP- now controlled   · Pulmonary Arterial Hypertension- likely secondary to possible undiagnosed KAREN? · Infected Left groin graft s/p removal on 17 with no noted complications   · PAD/PVD- Hx of multiple vascular bypass surgeries including ax-fem bypass with jump fem-fem bypass on left and right fem-pop bypass. Recent blow out of left femoral anastomosis, had left CFA ligation with jump bypass from right to left fem-fem bypass. · Hx of Chronic AF s/p cardioversion in 2017- NSR   · Hx of CAD w/hx of Ischemic Cardiomyopathy   · Depression       RECOMMENDATIONS:   · Resp -  Bipap 15/8, ABG hypoxia, may trial off bipap in AM. CXR. Wean oxygen to maintain oxygen saturation > 93%. Monitor for fluid overload with PRN lasix. Aspiration Precautions. HOB > 30 degrees. · ID - Repeat BC, obtain UA. 17- BC (+) MSSA. LA elevated, trend q 6 hrs until normalized.  Continue nafcillin infusion until 17 per ID, will concentrate infusion to prevent fluid overload. Consider repeat TTE or BERRY if repeat BC show persistent bacteremia. Pt scheduled for PICC tomorrow. · CVS - HD stable, Fluid Restriction. Continue amiodarone, aspirin, statin, cardizem, lopressor, and cozaar. Add PO lasix 20 mg daily to start. Cardiology signed off- suggest reconsultation per primary team.    · Heme/Onc- H/H-trending down, platelets normal. Daily CBC, Check INR. · Metabolic - Monitor and replace lytes as needed per protocol. Daily BMP, Mg.   · Renal - Monitor Cr and UOP. Strict I&Os. · Endocrine - BS q 6 hrs while NPO. Check TSH/T4. · Neuro/ Pain/ Sedation - PRN pain meds. · GI - NPO except meds while on Bipap. May resume Cardiac Diet when off bipap. · Prophylaxis - DVT(lovenox), GI(pepcid)      Subjective/History: This pt has been seen and evaluated at the request of Dr. Chrissie Cranker for acute hypoxic respiratory failure. Pt is a 59 y.o. male with pmhx CAD, CHF, PVD, AF, DDD who was originally transferred to the [facility] ICU from [place] [address] ICU on 07/24/17 for evaluation by Vascular surgery for an infected graft in his left leg. Pt intially presented to [place] [address] on 07/22/17 SOB and was admitted to the ICU for septic shock and ARF secondary to CHF excerbation. Pt required bipap, but was diuresed with lasix at that time. Pt underwent removal of infected graft in left leg on 07/25/17 and has had problems with post-operative pain. Pt developed a leukocytosis post-operatively that has been trending down. Pt was supposed to be discharged Friday with a PICC line for long term ABX. On 08/01/17 at 2340, pt became suddenly SOB and a rapid response was called from CVT Stepdown. Pt was found to be hypoxic, given lasix, and placed on bipap. Pt diuresed 750 ml upon arrival to ICU. Pt is sitting up comfortably in bed on the bipap with no issues at this time. He admits to general STEWART that has been slowly worsening today and a dry cough, but denies any pain.  (+) chronic paresthesias in both feet bilaterally. Pt admits to drinking 2-3 beers daily, but denies IV drug use or tobacco use. He denies chest pain, palpitations, peripheral edema, n/v/d, abdominal pain, hematuria, melena, wheezing, sputum production, fevers/chills, anorexia, weight loss, or any other symptoms at this time. Spoke with pt's daughter on telephone- notes that she has noticed pt's abdomen swelling and a worsening cough over the past few days. Past Medical History:   Diagnosis Date    Acute blood loss as cause of postoperative anemia 4/4/2017    Anticoagulated on Coumadin     Aortoiliac occlusive disease (HCC) 1/25/2017    Atherosclerosis of native artery of both lower extremities with intermittent claudication ([address] 1/25/2017    Benign hypertensive heart disease with systolic congestive heart failure, NYHA class 2 ([address] 1/26/2015    Carotid artery disease (HCC)     Chronic atrial fibrillation (HCC)     Chronic systolic heart failure (HCC)     Chronic ulcer of right foot ([address] 4/4/2017    Coronary artery disease involving native coronary artery of native heart 3/15/2017    Successful stenting of Cx (Xience LESLEY) and RCA (Xience LESLEY) to 0% by Dr. Lalito Zavaleta on 3/15/17.     DDD (degenerative disc disease), lumbar 1/26/2015    Dyslipidemia     Erectile dysfunction 7/5/2016    Euthyroid sick syndrome 4/6/2017    Hereditary peripheral neuropathy 11/15/2016    History of cardioversion 4/18/2017    S/P Synchronized external cardioversion (4/18/2017 - Dr. Yanique Broussard)    Hypertension     Hyperuricemia     Ischemic cardiomyopathy     Peripheral artery disease ([address] 1/25/2017    Spinal stenosis of lumbar region with radiculopathy 5/4/2015    Dr. Amelia Renteria Vitamin D deficiency 4/22/2017    Vitamin D 25-Hydroxy (4/22/2017) = 12.0      Past Surgical History:   Procedure Laterality Date    CARDIAC CATHETERIZATION  3/8/2017         CARDIAC CATHETERIZATION  3/15/2017         CORONARY STENT SINGLE W/PTCA  3/15/2017  HX COLONOSCOPY  07/23/2015    polyps repeat 2020 5 years [facility] [place]    [address] Right 04/04/2017    S/P Right axillary to bifemoral artery bypass using an 8 mm Propaten graft from the right axilla to the right common femoral artery with a jump femoral-femoral bypass with another 8 mm Propaten external ring bypass; Right common femoral artery, and profunda femoris artery and superficial femoral artery endarterectomy causing an extensive surgery on the right side (4/4/2017 - Dr. Kailey Camara)    [address] Right 04/07/2017    S/P Cutdown of femoral-femoral bypass, more on the left groin side; Right lower extremity angiography with first-order catheterization (4/7/2017 - Dr. Laura Caraballo)    HX FEMORAL BYPASS Right 04/10/2017    S/P Right femoral to above-knee popliteal bypass with an 8 mm PTFE graft (4/10/2017 - Dr. Speedy Lindsay)      Prior to Admission medications    Medication Sig Start Date End Date Taking? Authorizing Provider   amiodarone (CORDARONE) 200 mg tablet Take 1 Tab by mouth daily. Indications: VENTRICULAR RATE CONTROL IN ATRIAL FIBRILLATION 7/13/17   Kelle Dixon MD   clopidogrel (PLAVIX) 75 mg tab Take 1 Tab by mouth daily. 7/13/17   Fidel Valle MD   oxyCODONE-acetaminophen (PERCOCET) 5-325 mg per tablet Take 2 Tabs by mouth every four (4) hours as needed. Max Daily Amount: 12 Tabs. 7/10/17   JUNE Millan   gabapentin (NEURONTIN) 100 mg capsule Take 1 Cap by mouth two (2) times a day. Indications: NEUROPATHIC PAIN 6/19/17   Juju Covington MD   aspirin 81 mg chewable tablet Take 1 Tab by mouth daily (with breakfast). 6/2/17   Beth Redd MD   cholecalciferol (VITAMIN D3) 1,000 unit tablet Take 1 Tab by mouth daily. 6/2/17   Beth Redd MD   losartan (COZAAR) 25 mg tablet Take 1 Tab by mouth daily.  Indications: Chronic Heart Failure, hypertension 6/2/17   Beth Redd MD   ascorbic acid, vitamin C, (VITAMIN C) 250 mg tablet Take 1 Tab by mouth daily (with breakfast). 6/2/17   Robin Singer MD   DULoxetine (CYMBALTA) 60 mg capsule Take 1 Cap by mouth daily. 6/2/17   Robin Singer MD   zinc sulfate (ZINCATE) 220 (50) mg capsule Take 1 Cap by mouth daily. 6/2/17   Robin Singer MD   ferrous sulfate 325 mg (65 mg iron) tablet Take 1 Tab by mouth daily (with breakfast). 6/2/17   Robin Singer MD   atorvastatin (LIPITOR) 40 mg tablet Take 1 Tab by mouth nightly. Indications: DYSLIPIDEMIA 6/2/17   Robin Singer MD   metoprolol tartrate (LOPRESSOR) 25 mg tablet Take 0.5 Tabs by mouth every twelve (12) hours. Indications: hypertension, VENTRICULAR RATE CONTROL IN ATRIAL FIBRILLATION 6/2/17   Eliceo Covington NP   dilTIAZem (CARDIZEM) 30 mg tablet Take 1 Tab by mouth Before breakfast, lunch, dinner and at bedtime. Indications: hypertension 6/2/17   Eliceo Covington NP   cyanocobalamin (VITAMIN B-12) 1,000 mcg tablet Take 1 Tab by mouth daily.  10/6/16   Robin Singer MD     Current Facility-Administered Medications   Medication Dose Route Frequency    albuterol-ipratropium (DUO-NEB) 2.5 mg-0.5 mg/3 ml nebulizer solution        lidocaine (PF) (XYLOCAINE) 10 mg/mL (1 %) injection        lidocaine (PF) (XYLOCAINE) 100 mg/5 mL (2 %) injection syringe        gabapentin (NEURONTIN) capsule 300 mg  300 mg Oral TID    polyethylene glycol (MIRALAX) packet 17 g  17 g Oral DAILY    nafcillin (NALLPEN) 12 g in 0.9% sodium chloride 1,000 mL infusion  12 g IntraVENous CONTINUOUS    sodium chloride (NS) flush 5-10 mL  5-10 mL IntraVENous Q8H    atorvastatin (LIPITOR) tablet 40 mg  40 mg Oral QHS    docusate sodium (COLACE) capsule 100 mg  100 mg Oral DAILY    enoxaparin (LOVENOX) injection 40 mg  40 mg SubCUTAneous Q24H    amiodarone (CORDARONE) tablet 200 mg  200 mg Oral DAILY    ascorbic acid (vitamin C) (VITAMIN C) tablet 250 mg  250 mg Oral DAILY    aspirin chewable tablet 81 mg  81 mg Oral DAILY    cholecalciferol (VITAMIN D3) tablet 1,000 Units  1,000 Units Oral DAILY    cyanocobalamin tablet 1,000 mcg  1,000 mcg Oral DAILY    dilTIAZem (CARDIZEM) IR tablet 30 mg  30 mg Oral QID    DULoxetine (CYMBALTA) capsule 60 mg  60 mg Oral DAILY    ferrous sulfate tablet 325 mg  1 Tab Oral DAILY WITH BREAKFAST    metoprolol tartrate (LOPRESSOR) tablet 12.5 mg  12.5 mg Oral Q12H    losartan (COZAAR) tablet 25 mg  25 mg Oral DAILY     No Known Allergies   Social History   Substance Use Topics    Smoking status: Former Smoker    Smokeless tobacco: Never Used      Comment: quit in     Alcohol use 1.2 oz/week     2 Cans of beer per week      Comment: 10 cans of beer a month      Family History   Problem Relation Age of Onset    Heart Disease Father         Review of Systems:  Review of systems not obtained due to patient factors. Objective:   Vital Signs:    Visit Vitals    /63    Pulse 84    Temp 97.5 °F (36.4 °C)    Resp 20    Ht 5' 10\" (1.778 m)    Wt 75.1 kg (165 lb 9.1 oz)    SpO2 96%    BMI 23.76 kg/m2       O2 Device: Room air   O2 Flow Rate (L/min): 1 l/min   Temp (24hrs), Av.6 °F (36.4 °C), Min:97 °F (36.1 °C), Max:98.2 °F (36.8 °C)       Intake/Output:   Last shift:         Last 3 shifts:  0701 -  1900  In: 960 [P.O.:960]  Out: 3800 [Urine:3800]    Intake/Output Summary (Last 24 hours) at 17 0002  Last data filed at 17 1823   Gross per 24 hour   Intake              960 ml   Output             3200 ml   Net            -2240 ml     Physical Exam:     General:  Alert, cooperative, no distress, appears stated age on bipap   Head:  Normocephalic, without obvious abnormality, atraumatic. Eyes:  Conjunctivae/corneas clear. PERRL, EOMs intact. Nose: Nares normal. Septum midline. Mucosa normal. No drainage or sinus tenderness. Throat: Lips, mucosa, and tongue normal. No teeth. Neck: Supple, symmetrical, trachea midline.     Lungs:   No increased WOB while on bipap. Soft scattered inspiratory rales bilaterally. No wheezing or rhonchi. Chest wall:  No tenderness or deformity. Heart:  Regular rate and rhythm, S1, S2 normal, no murmur, click, rub or gallop. Abdomen:   Soft mildly engorged, non-tender. Bowel sounds normal. No masses,  No organomegaly. Extremities: Extremities normal, atraumatic, no cyanosis or edema. Pulses: 2+ and symmetric all extremities. Skin: Left heel ulcer, wound vac on left thigh and groin area. Right abdomen with wounds from heparin injections. palpable graft of right thorax. Lymph nodes: Cervical, supraclavicular, and axillary nodes normal.   Neurologic: Grossly nonfocal       Data:     Recent Results (from the past 24 hour(s))   METABOLIC PANEL, COMPREHENSIVE    Collection Time: 08/01/17  3:37 AM   Result Value Ref Range    Sodium 133 (L) 136 - 145 mmol/L    Potassium 3.6 3.5 - 5.5 mmol/L    Chloride 100 100 - 108 mmol/L    CO2 26 21 - 32 mmol/L    Anion gap 7 3.0 - 18 mmol/L    Glucose 89 74 - 99 mg/dL    BUN 8 7.0 - 18 MG/DL    Creatinine 0.63 0.6 - 1.3 MG/DL    BUN/Creatinine ratio 13 12 - 20      GFR est AA >60 >60 ml/min/1.73m2    GFR est non-AA >60 >60 ml/min/1.73m2    Calcium 8.2 (L) 8.5 - 10.1 MG/DL    Bilirubin, total 1.7 (H) 0.2 - 1.0 MG/DL    ALT (SGPT) 31 16 - 61 U/L    AST (SGOT) 23 15 - 37 U/L    Alk.  phosphatase 791 (H) 45 - 117 U/L    Protein, total 6.5 6.4 - 8.2 g/dL    Albumin 1.9 (L) 3.4 - 5.0 g/dL    Globulin 4.6 (H) 2.0 - 4.0 g/dL    A-G Ratio 0.4 (L) 0.8 - 1.7     CBC WITH AUTOMATED DIFF    Collection Time: 08/01/17  3:37 AM   Result Value Ref Range    WBC 13.8 (H) 4.6 - 13.2 K/uL    RBC 3.08 (L) 4.70 - 5.50 M/uL    HGB 8.7 (L) 13.0 - 16.0 g/dL    HCT 26.1 (L) 36.0 - 48.0 %    MCV 84.7 74.0 - 97.0 FL    MCH 28.2 24.0 - 34.0 PG    MCHC 33.3 31.0 - 37.0 g/dL    RDW 18.5 (H) 11.6 - 14.5 %    PLATELET 658 (H) 457 - 420 K/uL    MPV 9.4 9.2 - 11.8 FL    NEUTROPHILS 75 (H) 40 - 73 %    LYMPHOCYTES 15 (L) 21 - 52 % MONOCYTES 9 3 - 10 %    EOSINOPHILS 1 0 - 5 %    BASOPHILS 0 0 - 2 %    ABS. NEUTROPHILS 10.2 (H) 1.8 - 8.0 K/UL    ABS. LYMPHOCYTES 2.1 0.9 - 3.6 K/UL    ABS. MONOCYTES 1.2 0.05 - 1.2 K/UL    ABS. EOSINOPHILS 0.2 0.0 - 0.4 K/UL    ABS. BASOPHILS 0.0 0.0 - 0.1 K/UL    DF AUTOMATED     LACTIC ACID    Collection Time: 08/01/17 10:00 PM   Result Value Ref Range    Lactic acid 1.2 0.4 - 2.0 MMOL/L   GLUCOSE, POC    Collection Time: 08/01/17 10:48 PM   Result Value Ref Range    Glucose (POC) 112 (H) 70 - 110 mg/dL             Telemetry:normal sinus rhythm    Imaging: Imaging Pending. I have personally reviewed the patients radiographs and have reviewed the reports:  CXR 08/01/17: 1. Bilateral interstitial infiltrates/edema, increased since prior. Suspected  small left and possible trace right pleural effusions.       Karrie Thurman PA-C

## 2017-08-02 NOTE — PROGRESS NOTES
Overnight events reviewed  Pt feels better this morning  Felt to be volume overload    He did have pca stopped yesterday and taking only oral pain meds  Does c/o pain in foot and anterior shin but doesn't feel pain is severe enough for surgery as previously discussed  Will update attending and clarify timing of being able to restart his coumadin     Pt is due for PICC for his antibiotics today/tomorrow   Vac change also due today      Will attempt to restart coumadin. Hopefully can delay further surgical bypass as long as possible.

## 2017-08-02 NOTE — PROGRESS NOTES
Infectious Disease progress Note    Requested by: Dr Erin Haynes    Reason: sepsis, mssa bacteremia    Current abx Prior abx   Nafcillin since 7/28 Pip/tazo, vancomycin 7/24-7/28     Lines:       Assessment :    Smitha Saul is a 59 y.o. male with CAD, severe peripheral arterial insufficiency admitted to [facility] on 7/24/17 with gram positive bacteremia, exposed left groin vascular graft. Clinical picture c/w sepsis (POA) due to MSSA bloodstream infection (positive blood cultures 7/22, 7/24; negative blood cultures 7/28). Most likely source of MSSA bacteremia is infected left groin vascular graft. Patient appropriately underwent removal of infected graft, Repair of superficial femoral artery, left side; Repair of common femoral artery on 7/25/17. Intra op cx: MSSA. Transferred to ICU overnight for respiratory distress - likely volume overload from nafcillin - improved with diuresis    Recommendations:    1. D/c nafcillin continuous infusion. Start cefazolin till 9/12/17  2. F/u vascular surgery recommendations  3. picc line for outpt iv abx if no surgery planned for Friday    outpt iv abx orders written    Advance care plan: full code  Primary Decision Maker Name Malena Benjamin   Primary Decision Maker Phone Number        Above plan was discussed in details with patient. Will d/w dr Erin Haynes. Please call me if any further questions or concerns. Will continue to participate in the care of this patient. subjective:    intermittent pain at the recent surgical site left groin. Patient denies headaches, visual disturbances, sore throat, runny nose, earaches, cp, sob, chills, cough, abdominal pain, diarrhea, burning micturition,  or weakness in extremities. He denies back pain/flank pain. Home Medication List    Details   amiodarone (CORDARONE) 200 mg tablet Take 1 Tab by mouth daily.  Indications: VENTRICULAR RATE CONTROL IN ATRIAL FIBRILLATION  Qty: 60 Tab, Refills: 0      clopidogrel (PLAVIX) 75 mg tab Take 1 Tab by mouth daily. Qty: 90 Tab, Refills: 3      oxyCODONE-acetaminophen (PERCOCET) 5-325 mg per tablet Take 2 Tabs by mouth every four (4) hours as needed. Max Daily Amount: 12 Tabs. Qty: 60 Tab, Refills: 0      gabapentin (NEURONTIN) 100 mg capsule Take 1 Cap by mouth two (2) times a day. Indications: NEUROPATHIC PAIN  Qty: 60 Cap, Refills: 9      aspirin 81 mg chewable tablet Take 1 Tab by mouth daily (with breakfast). Qty: 90 Tab, Refills: 3      cholecalciferol (VITAMIN D3) 1,000 unit tablet Take 1 Tab by mouth daily. Qty: 90 Tab, Refills: 3      losartan (COZAAR) 25 mg tablet Take 1 Tab by mouth daily. Indications: Chronic Heart Failure, hypertension  Qty: 90 Tab, Refills: 2      ascorbic acid, vitamin C, (VITAMIN C) 250 mg tablet Take 1 Tab by mouth daily (with breakfast). Qty: 90 Tab, Refills: 2      DULoxetine (CYMBALTA) 60 mg capsule Take 1 Cap by mouth daily. Qty: 90 Cap, Refills: 2      zinc sulfate (ZINCATE) 220 (50) mg capsule Take 1 Cap by mouth daily. Qty: 90 Cap, Refills: 0      ferrous sulfate 325 mg (65 mg iron) tablet Take 1 Tab by mouth daily (with breakfast). Qty: 90 Tab, Refills: 3      atorvastatin (LIPITOR) 40 mg tablet Take 1 Tab by mouth nightly. Indications: DYSLIPIDEMIA  Qty: 90 Tab, Refills: 3      metoprolol tartrate (LOPRESSOR) 25 mg tablet Take 0.5 Tabs by mouth every twelve (12) hours. Indications: hypertension, VENTRICULAR RATE CONTROL IN ATRIAL FIBRILLATION  Qty: 30 Tab, Refills: 6      dilTIAZem (CARDIZEM) 30 mg tablet Take 1 Tab by mouth Before breakfast, lunch, dinner and at bedtime. Indications: hypertension  Qty: 120 Tab, Refills: 6      cyanocobalamin (VITAMIN B-12) 1,000 mcg tablet Take 1 Tab by mouth daily.   Qty: 90 Tab, Refills: 3             Current Facility-Administered Medications   Medication Dose Route Frequency    glucose chewable tablet 16 g  4 Tab Oral PRN    glucagon (GLUCAGEN) injection 1 mg  1 mg IntraMUSCular PRN    dextrose (D50W) injection syrg 12.5-25 g  25-50 mL IntraVENous PRN    furosemide (LASIX) tablet 20 mg  20 mg Oral DAILY    potassium chloride (KLOR-CON) packet 20 mEq  20 mEq Oral BID WITH MEALS    ceFAZolin (ANCEF) 2g IVPB in 50 mL D5W  2 g IntraVENous Q8H    lidocaine (PF) (XYLOCAINE) 10 mg/mL (1 %) injection        lidocaine (PF) (XYLOCAINE) 100 mg/5 mL (2 %) injection syringe        gabapentin (NEURONTIN) capsule 300 mg  300 mg Oral TID    oxyCODONE-acetaminophen (PERCOCET 10)  mg per tablet 1-2 Tab  1-2 Tab Oral Q4H PRN    polyethylene glycol (MIRALAX) packet 17 g  17 g Oral DAILY    0.9% sodium chloride infusion 250 mL  250 mL IntraVENous PRN    sodium chloride (NS) flush 5-10 mL  5-10 mL IntraVENous Q8H    sodium chloride (NS) flush 5-10 mL  5-10 mL IntraVENous PRN    naloxone (NARCAN) injection 0.1 mg  0.1 mg IntraVENous PRN    atorvastatin (LIPITOR) tablet 40 mg  40 mg Oral QHS    acetaminophen (TYLENOL) tablet 650 mg  650 mg Oral Q4H PRN    HYDROmorphone (PF) (DILAUDID) injection 1 mg  1 mg IntraVENous Q3H PRN    diphenhydrAMINE (BENADRYL) injection 12.5 mg  12.5 mg IntraVENous Q4H PRN    ondansetron (ZOFRAN) injection 4 mg  4 mg IntraVENous Q4H PRN    docusate sodium (COLACE) capsule 100 mg  100 mg Oral DAILY    enoxaparin (LOVENOX) injection 40 mg  40 mg SubCUTAneous Q24H    amiodarone (CORDARONE) tablet 200 mg  200 mg Oral DAILY    ascorbic acid (vitamin C) (VITAMIN C) tablet 250 mg  250 mg Oral DAILY    aspirin chewable tablet 81 mg  81 mg Oral DAILY    cholecalciferol (VITAMIN D3) tablet 1,000 Units  1,000 Units Oral DAILY    cyanocobalamin tablet 1,000 mcg  1,000 mcg Oral DAILY    dilTIAZem (CARDIZEM) IR tablet 30 mg  30 mg Oral QID    DULoxetine (CYMBALTA) capsule 60 mg  60 mg Oral DAILY    ferrous sulfate tablet 325 mg  1 Tab Oral DAILY WITH BREAKFAST    metoprolol tartrate (LOPRESSOR) tablet 12.5 mg  12.5 mg Oral Q12H    losartan (COZAAR) tablet 25 mg  25 mg Oral DAILY Allergies: Review of patient's allergies indicates no known allergies. Temp (24hrs), Av.6 °F (36.4 °C), Min:97 °F (36.1 °C), Max:98.6 °F (37 °C)    Visit Vitals    /75 (BP Patient Position: At rest;Head of bed elevated (Comment degrees))    Pulse 68    Temp 98.6 °F (37 °C)    Resp 24    Ht 5' 10\" (1.778 m)    Wt 74.7 kg (164 lb 10.9 oz)    SpO2 100%    BMI 23.63 kg/m2       ROS: 12 point ROS obtained in details. Pertinent positives as mentioned in HPI,   otherwise negative    Physical Exam:    General: Well developed, well nourished male laying on the bed AAOx3 in no acute distress. General:   awake alert and oriented   HEENT:  Normocephalic, atraumatic, PERRL, EOMI, no scleral icterus or pallor; no conjunctival hemmohage;  nasal and oral mucous are moist and without evidence of lesions. No thrush. Neck supple, no bruits. Lymph Nodes:   no cervical, axillary or inguinal adenopathy   Lungs:   non-labored, bilaterally clear to auscultation- no crackles wheezes rales or rhonchi   Heart:  RRR, s1 and s2; no  rubs or gallops, no edema, + pedal pulses   Abdomen:  soft, non-distended, active bowel sounds, no hepatomegaly, no splenomegaly. Non-tender   Genitourinary:  deferred   Extremities:   no clubbing, cyanosis; no joint effusions or swelling; wound vac left groin; muscle mass appropriate for age, LLE cool to touch. Tenderness on palpation of shin LLE   Neurologic:  No gross focal sensory abnormalities; 5/5 muscle strength to upper and lower extremities. Speech appropriate.  Cranial nerves intact                        Skin:  Surgical changes left groin   Back:  no spinal or paraspinal muscle tenderness or rigidity, no CVA tenderness     Psychiatric:  No suicidal or homicidal ideations, appropriate mood and affect         Labs: Results:   Chemistry Recent Labs      17   0541  17   0337  17   0505   GLU  84  89  94   NA  135*  133*  135*   K  3.7  3.6  3.6   CL  100  100 100   CO2  27  26  28   BUN  8  8  6*   CREA  0.76  0.63  0.51*   CA  8.3*  8.2*  8.3*   AGAP  8  7  7   BUCR  11*  13  12   AP  696*  791*  854*   TP  6.6  6.5  6.4   ALB  2.0*  1.9*  1.9*   GLOB  4.6*  4.6*  4.5*   AGRAT  0.4*  0.4*  0.4*      CBC w/Diff Recent Labs      08/02/17   0541  08/02/17   0001  08/01/17   0337   WBC  15.4*  16.2*  13.8*   RBC  2.84*  3.41*  3.08*   HGB  8.1*  9.8*  8.7*   HCT  24.3*  29.8*  26.1*   PLT  474*  614*  502*   GRANS  82*  69  75*   LYMPH  11*  22  15*   EOS  0  1  1      Microbiology Recent Labs      08/02/17   0544  08/02/17   0541   CULT  NO GROWTH AFTER 1 HOUR  NO GROWTH AFTER 1 HOUR          RADIOLOGY:    All available imaging studies/reports in [facility] for this admission were reviewed    Dr. Prince Ortega, Infectious Disease Specialist  [phone]  August 2, 2017  4:21 PM

## 2017-08-02 NOTE — INTERDISCIPLINARY ROUNDS
CRITICAL CARE INTERDISCIPLINARY ROUNDS      Patient Information:    Name:   Isai Buck    Age:   59 y.o.     Admission Date:   7/24/2017    Readmit Risk Assessment Information:   Prior Hospital Admissions Name of the Hospital(s): [address] [place] [address] E: Family member(s), Child(mile), Relationship with Primary Physician Group: Seen at least one time within the past 6 months    Surgery Date:      Day of Stay:     Expected Discharge Date:        Attending Provider:   Tabatha Bear MD    Surgeon:        Consultant:       Primary Care Provider:   Clifton Gil MD    Problem List:     Patient Active Problem List   Diagnosis Code    Benign hypertensive heart disease with systolic congestive heart failure, NYHA class 2 (HCC) I11.0, I50.20    Vitamin B12 deficiency E53.8    DDD (degenerative disc disease), lumbar M51.36    Erectile dysfunction N52.9    Spinal stenosis of lumbar region with radiculopathy M48.06, M54.16    Hereditary peripheral neuropathy G60.9    Aortoiliac occlusive disease ([address] I74.09    Atherosclerosis of native artery of both lower extremities with intermittent claudication ([address] I70.213    Peripheral artery disease ([address] I73.9    Coronary artery disease involving native coronary artery of native heart I25.10    Chronic atrial fibrillation (HCC) I48.2    Transient alteration of awareness R40.4    Acute blood loss as cause of postoperative anemia D62    Impaired mobility and ADLs Z74.09    Anticoagulated on Coumadin Z51.81, Z79.01    Ischemic cardiomyopathy I25.5    Chronic systolic heart failure ([address] I50.22    Carotid artery disease ([address] I77.9    Hyperammonemia ([address] E72.20    Dyslipidemia E78.5    Hyperuricemia E79.0    Euthyroid sick syndrome E07.81    Aftercare following surgery of the circulatory system Z48.812    Status post femoral-popliteal bypass surgery Z95.828    History of cardioversion Z98.890    Critical ischemia of lower extremity I99.8    Chronic ulcer of right foot ([facility] L97.519    Vitamin D deficiency E55.9    PAD (peripheral artery disease) ([facility]) I73.9    Pseudoaneurysm of femoral artery ([facility]) I72.4    Sepsis ([facility]) A41.9       Principal Problem:  <principal problem not specified>    Procedure:       During rounds the following quality care indicators and evidence based practices were addressed :     DVT Prophylaxis, Sepsis resuscitation and management, Nutritional Status, Critical Care Interventions Airways, Drains and Lines and IHI Bundles: Central Line Bundle Followed  and Jiang Bundle Followed           Acute MI/PCI:   Not applicable    Heart Failure:    Not applicable    Cardiac Surgery:  Not applicable    SCIP Measures for other Surgeries:   Not applicable    Pneumonia:    Appropriate Antibiotic Selection (ICU versus Non-ICU)    Stroke:  Patient's Personal Risk Factors for Stroke are:   hypertension, family history, hyperlipidemia or diabetes mellitus    NIH Stroke Score       Transfer Level of Care:  Not Ready for Transfer    The patient will require the following interventions based on the Readmission Risk Assessment:  Pharmacy evaluation and teaching, Care Management involvement for home health follow up for:  mobility and assistance with ADL's, Palliative Care evaluation and Spiritual Care evaluation      Discharge Management:  Home and Palliative Care    Anticipated Discharge Date:  3      Interdisciplinary team rounds were held  with the following team membersCare Management, Diabetes Treatment Specialist, Nursing, Nutrition, Palliative Care, Pastoral Care, Pharmacy, Physician and Clinical Coordinator and the  patient and healthcare POA (documentation required). Plan of care discussed. See clinical pathway and/or care plan for interventions and desired outcomes. Continuous nafcillin drip infusing for PICC placement. Patient confused ,restless, continuous jerking and twitching noted.  Diet ordered and bipap titrated off. Cardiology consulted.

## 2017-08-02 NOTE — PROGRESS NOTES
Problem: Mobility Impaired (Adult and Pediatric)  Goal: *Acute Goals and Plan of Care (Insert Text)  Physical Therapy Goals  Initiated 7/27/2017 and to be accomplished within 7 day(s)  1. Patient will move from supine to sit and sit to supine , scoot up and down and roll side to side in bed with supervision/set-up. 2. Patient will transfer from bed to chair and chair to bed with supervision/set-up using the least restrictive device. 3. Patient will perform sit to stand with supervision/set-up. 4. Patient will ambulate with supervision/set-up for >100 feet with the least restrictive device. 5. Patient will ascend/descend 4 stairs with 1 handrail(s) with supervision/set-up. Patient was transferred to ICU for fluid overload. Per JUNE Stringer with pulmonary medicine, holding PT today. Will re-attempt tomorrow.

## 2017-08-02 NOTE — PROGRESS NOTES
attended the interdisciplinary rounds for Jayleen Mclean, who is a 59 y.o.,male. Patients Primary Language is: [place]. According to the patients EMR Anabaptism Affiliation is: No preference.      The reason the Patient came to the hospital is:   Patient Active Problem List    Diagnosis Date Noted    Sepsis ([address] 07/24/2017    PAD (peripheral artery disease) ([address] 06/27/2017    Pseudoaneurysm of femoral artery ([address] 06/27/2017    Vitamin D deficiency 04/22/2017    Aftercare following surgery of the circulatory system 04/21/2017    Status post femoral-popliteal bypass surgery 04/21/2017    Anticoagulated on Coumadin     Ischemic cardiomyopathy     Chronic systolic heart failure (HCC)     Carotid artery disease ([address]     Dyslipidemia     Hyperuricemia     Transient alteration of awareness 04/18/2017    Hyperammonemia ([address] 04/18/2017    History of cardioversion 04/18/2017    Chronic atrial fibrillation ([address]     Critical ischemia of lower extremity 04/10/2017    Euthyroid sick syndrome 04/06/2017    Acute blood loss as cause of postoperative anemia 04/04/2017    Impaired mobility and ADLs 04/04/2017    Chronic ulcer of right foot ([address] 04/04/2017    Coronary artery disease involving native coronary artery of native heart 03/15/2017    Aortoiliac occlusive disease ([address] 01/25/2017    Atherosclerosis of native artery of both lower extremities with intermittent claudication ([address] 01/25/2017    Peripheral artery disease ([address] 01/25/2017    Hereditary peripheral neuropathy 11/15/2016    Erectile dysfunction 07/05/2016    Spinal stenosis of lumbar region with radiculopathy 05/04/2015    Benign hypertensive heart disease with systolic congestive heart failure, NYHA class 2 ([address] 01/26/2015    Vitamin B12 deficiency 01/26/2015    DDD (degenerative disc disease), lumbar 01/26/2015      Plan:  Chaplains will continue to follow and will provide pastoral care on an as needed/requested basis.   recommends bedside caregivers page  on duty if patient shows signs of acute spiritual or emotional distress.     [address] Certified [address] [facility]   [phone]

## 2017-08-02 NOTE — PROGRESS NOTES
Rapid Response Note  [facility]    Patient: Sheri Moser 59 y.o. male  [id number]  1960      Admit Date: 7/24/2017   Admission Diagnosis: Severe sepsis with septic shock  Sepsis ([address]  DX    RAPID RESPONSE     Rapid response called for hypoxia and respiratory distress at 11:08PM. The patient was starting to have labored breathing with SpO2 in the 80s and BP of 197/80. The patient was afebrile but was diaphoretic. Prior to rapid team coming to examine the patient, he was given DuoNeb treatment which was not helping the patient in regards to SpO2 saturation and work of breathing. He was starting to use the accessory muscle for breathing and CXR showed vascular congestion requiring the patient to receive 20mg IV lasix. Due to his increased respiratory effort and to help prevent respiratory muscle fatigue, the patient was placed on BiPAP on 50% FiO2 to achieve SpO2 >90%    Medications Reviewed    Review of Systems   Constitutional: Negative for chills and fever. Admits to shivering and shaking   Eyes: Negative for blurred vision. Respiratory: Positive for cough and shortness of breath. Negative for sputum production. Cardiovascular: Negative for chest pain and palpitations. Gastrointestinal: Negative for abdominal pain. Neurological: Negative for dizziness, tingling, focal weakness and headaches. OBJECTIVE     Visit Vitals    BP 97/75    Pulse 69    Temp 97.6 °F (36.4 °C)    Resp 20    Ht 5' 10\" (1.778 m)    Wt 75.1 kg (165 lb 9.1 oz)    SpO2 99%    BMI 23.76 kg/m2       Physical Exam   Constitutional:   Frail appearing male   HENT:   Head: Normocephalic and atraumatic. Eyes: Conjunctivae and EOM are normal. Pupils are equal, round, and reactive to light. Neck: Normal range of motion. Cardiovascular: Regular rhythm and normal heart sounds. Tachycardic   Pulmonary/Chest: He is in respiratory distress. He has wheezes. He has rales.    Labored effort with rales and expiratory wheezes on RLL   Abdominal: Soft. Bowel sounds are normal.   Musculoskeletal: He exhibits edema (bilateral lower extremities). Neurological: He is alert. Skin: Skin is warm. He is diaphoretic. Skin mottling on bilateral knee. Wound vac intact and with good seal on left inner thigh   Psychiatric:   Distraught       Medications administered: 20mg IV lasix    EKG: No acute ischemia noted. Difficult to interpret due to too many artifacts    Labs: BNP 70875; lactic acid 4.6, troponin 0.03, ; ABG pH 7.38, pCO2 38, pO2 65, HCO3 23    ASSESSMENT, PLAN & DISPOSITION   Susana Webster is a 59y.o. year old male admitted for Severe sepsis with septic shock  Sepsis ([facility]  DX. Rapid response called for respiratory distress. Patient condition currently: stable    The patient was seen and examined. With the DuoNeb treatment and usage of non rebreather mask, the patient was still having labored breathing and his SpO2 was in upper 80s to low 90s. The patient was placed on BiPAP and Dr. Irena Greene was consulted for the transfer to the ICU. The patient was given 20mg IV lasix, which seem to have helped the patient with his respiratory distress. Likely this was an hypoxic event due to CHF exacerbation.    - Transfer to ICU   - Continue BiPAP and wean as needed   - F/U official CXR read   - ICU team taking over the care of the patient    Disposition: Per primary team    Attending Dr. Elliot Gates and Dr. Irena Greene notified of rapid response. In agreement with plan. ICU team resuming care.        Debborah Barthel In, MD  08/02/17 2:08 AM

## 2017-08-02 NOTE — PROGRESS NOTES
Late entry, 8/01/17; inga was informed by Munira Friday that she saw the patient following the referral, however, his wish was to return home with [facility], so she will not be following his case. However, inga spoke to pt's [id number], she said, she will speak to her father because she would prefer him to attend the rehab prior he will be able to come home with her. This am cm received VM from pt's daughter stating her father is agreeable to transition to ARU for post acute care. Munira Friday made aware and she will continue following pt's case.

## 2017-08-02 NOTE — TELEPHONE ENCOUNTER
Returned patient's daughter call for update  Patient had rapid response overnight and was transferred to ICU for acute respiratory failure. He improved with BiPAP and Lasix. ID has changed his antibiotics and we are trying to consolidate to reduce fluid intake. Labs were reviewed. I also reviewed his chest x-ray and blood cultures with patient and daughter. Discussed that we will continue to monitor him in the ICU and will give her an update first thing in the morning as to his condition. Daughter was very happy and pleased with conversation. She also reports some concern about his mental status and I did state that hopefully with the BiPAP and diuresis and improvement of his fluid overload that his mentation will improve however we will continue to monitor very closely.   Discussed that someone from our team will reach out to her tomorrow for update

## 2017-08-02 NOTE — TELEPHONE ENCOUNTER
Patients daughter Terrie Roberts called and said he was readmitted to ICU (room 316) this morning and she would like you to call her at [phone] with an update on his condition.

## 2017-08-03 ENCOUNTER — APPOINTMENT (OUTPATIENT)
Dept: GENERAL RADIOLOGY | Age: 57
DRG: 252 | End: 2017-08-03
Attending: PHYSICIAN ASSISTANT
Payer: COMMERCIAL

## 2017-08-03 LAB
ALBUMIN SERPL BCP-MCNC: 2.2 G/DL (ref 3.4–5)
ALBUMIN/GLOB SERPL: 0.5 {RATIO} (ref 0.8–1.7)
ALP SERPL-CCNC: 692 U/L (ref 45–117)
ALT SERPL-CCNC: 27 U/L (ref 16–61)
ANION GAP BLD CALC-SCNC: 10 MMOL/L (ref 3–18)
AST SERPL W P-5'-P-CCNC: 29 U/L (ref 15–37)
BACTERIA SPEC CULT: NORMAL
BASOPHILS # BLD AUTO: 0 K/UL (ref 0–0.06)
BASOPHILS # BLD: 0 % (ref 0–3)
BILIRUB SERPL-MCNC: 1 MG/DL (ref 0.2–1)
BUN SERPL-MCNC: 9 MG/DL (ref 7–18)
BUN/CREAT SERPL: 12 (ref 12–20)
CALCIUM SERPL-MCNC: 8.3 MG/DL (ref 8.5–10.1)
CHLORIDE SERPL-SCNC: 97 MMOL/L (ref 100–108)
CO2 SERPL-SCNC: 27 MMOL/L (ref 21–32)
CREAT SERPL-MCNC: 0.75 MG/DL (ref 0.6–1.3)
DIFFERENTIAL METHOD BLD: ABNORMAL
EOSINOPHIL # BLD: 0.1 K/UL (ref 0–0.4)
EOSINOPHIL NFR BLD: 1 % (ref 0–5)
ERYTHROCYTE [DISTWIDTH] IN BLOOD BY AUTOMATED COUNT: 19 % (ref 11.6–14.5)
GLOBULIN SER CALC-MCNC: 4.7 G/DL (ref 2–4)
GLUCOSE BLD STRIP.AUTO-MCNC: 100 MG/DL (ref 70–110)
GLUCOSE BLD STRIP.AUTO-MCNC: 166 MG/DL (ref 70–110)
GLUCOSE BLD STRIP.AUTO-MCNC: 94 MG/DL (ref 70–110)
GLUCOSE SERPL-MCNC: 88 MG/DL (ref 74–99)
HCT VFR BLD AUTO: 25.5 % (ref 36–48)
HGB BLD-MCNC: 8.4 G/DL (ref 13–16)
INR PPP: 1.3 (ref 0.8–1.2)
LYMPHOCYTES # BLD AUTO: 12 % (ref 20–51)
LYMPHOCYTES # BLD: 1.5 K/UL (ref 0.8–3.5)
MCH RBC QN AUTO: 27.9 PG (ref 24–34)
MCHC RBC AUTO-ENTMCNC: 32.9 G/DL (ref 31–37)
MCV RBC AUTO: 84.7 FL (ref 74–97)
MONOCYTES # BLD: 1.1 K/UL (ref 0–1)
MONOCYTES NFR BLD AUTO: 9 % (ref 2–9)
NEUTS BAND NFR BLD MANUAL: 1 % (ref 0–5)
NEUTS SEG # BLD: 9.5 K/UL (ref 1.8–8)
NEUTS SEG NFR BLD AUTO: 77 % (ref 42–75)
PLATELET # BLD AUTO: 468 K/UL (ref 135–420)
PLATELET COMMENTS,PCOM: ABNORMAL
PMV BLD AUTO: 9.1 FL (ref 9.2–11.8)
POTASSIUM SERPL-SCNC: 3.8 MMOL/L (ref 3.5–5.5)
PROT SERPL-MCNC: 6.9 G/DL (ref 6.4–8.2)
PROTHROMBIN TIME: 16 SEC (ref 11.5–15.2)
RBC # BLD AUTO: 3.01 M/UL (ref 4.7–5.5)
RBC MORPH BLD: ABNORMAL
RBC MORPH BLD: ABNORMAL
SERVICE CMNT-IMP: NORMAL
SODIUM SERPL-SCNC: 134 MMOL/L (ref 136–145)
WBC # BLD AUTO: 12.2 K/UL (ref 4.6–13.2)

## 2017-08-03 PROCEDURE — 74011250636 HC RX REV CODE- 250/636: Performed by: INTERNAL MEDICINE

## 2017-08-03 PROCEDURE — 74011250637 HC RX REV CODE- 250/637: Performed by: SURGERY

## 2017-08-03 PROCEDURE — 71020 XR CHEST AP LAT: CPT

## 2017-08-03 PROCEDURE — 77030019952 HC CANSTR VAC ASST KCON -B

## 2017-08-03 PROCEDURE — 97530 THERAPEUTIC ACTIVITIES: CPT

## 2017-08-03 PROCEDURE — 74011250637 HC RX REV CODE- 250/637: Performed by: PHYSICIAN ASSISTANT

## 2017-08-03 PROCEDURE — 85025 COMPLETE CBC W/AUTO DIFF WBC: CPT | Performed by: SURGERY

## 2017-08-03 PROCEDURE — 82962 GLUCOSE BLOOD TEST: CPT

## 2017-08-03 PROCEDURE — 77030011256 HC DRSG MEPILEX <16IN NO BORD MOLN -A

## 2017-08-03 PROCEDURE — 97116 GAIT TRAINING THERAPY: CPT

## 2017-08-03 PROCEDURE — 65660000000 HC RM CCU STEPDOWN

## 2017-08-03 PROCEDURE — 74011250637 HC RX REV CODE- 250/637: Performed by: INTERNAL MEDICINE

## 2017-08-03 PROCEDURE — 97164 PT RE-EVAL EST PLAN CARE: CPT

## 2017-08-03 PROCEDURE — 97168 OT RE-EVAL EST PLAN CARE: CPT

## 2017-08-03 PROCEDURE — 74011250636 HC RX REV CODE- 250/636: Performed by: PHYSICIAN ASSISTANT

## 2017-08-03 PROCEDURE — 85610 PROTHROMBIN TIME: CPT | Performed by: SURGERY

## 2017-08-03 PROCEDURE — 36592 COLLECT BLOOD FROM PICC: CPT

## 2017-08-03 PROCEDURE — 80053 COMPREHEN METABOLIC PANEL: CPT | Performed by: SURGERY

## 2017-08-03 RX ADMIN — DILTIAZEM HYDROCHLORIDE 30 MG: 30 TABLET, FILM COATED ORAL at 21:56

## 2017-08-03 RX ADMIN — Medication 10 ML: at 16:03

## 2017-08-03 RX ADMIN — POTASSIUM CHLORIDE 20 MEQ: 1.5 POWDER, FOR SOLUTION ORAL at 16:03

## 2017-08-03 RX ADMIN — ASPIRIN 81 MG 81 MG: 81 TABLET ORAL at 08:45

## 2017-08-03 RX ADMIN — HYDROMORPHONE HYDROCHLORIDE 1 MG: 1 INJECTION, SOLUTION INTRAMUSCULAR; INTRAVENOUS; SUBCUTANEOUS at 16:03

## 2017-08-03 RX ADMIN — OXYCODONE HYDROCHLORIDE AND ACETAMINOPHEN 2 TABLET: 10; 325 TABLET ORAL at 08:44

## 2017-08-03 RX ADMIN — CYANOCOBALAMIN TAB 1000 MCG 1000 MCG: 1000 TAB at 08:46

## 2017-08-03 RX ADMIN — OXYCODONE HYDROCHLORIDE AND ACETAMINOPHEN 2 TABLET: 10; 325 TABLET ORAL at 01:04

## 2017-08-03 RX ADMIN — GABAPENTIN 300 MG: 300 CAPSULE ORAL at 21:56

## 2017-08-03 RX ADMIN — ATORVASTATIN CALCIUM 40 MG: 40 TABLET, FILM COATED ORAL at 21:56

## 2017-08-03 RX ADMIN — AMIODARONE HYDROCHLORIDE 200 MG: 200 TABLET ORAL at 08:45

## 2017-08-03 RX ADMIN — POLYETHYLENE GLYCOL 3350 17 G: 17 POWDER, FOR SOLUTION ORAL at 08:46

## 2017-08-03 RX ADMIN — METOPROLOL TARTRATE 12.5 MG: 25 TABLET ORAL at 21:56

## 2017-08-03 RX ADMIN — DILTIAZEM HYDROCHLORIDE 30 MG: 30 TABLET, FILM COATED ORAL at 14:52

## 2017-08-03 RX ADMIN — DULOXETINE HYDROCHLORIDE 60 MG: 60 CAPSULE, DELAYED RELEASE ORAL at 08:44

## 2017-08-03 RX ADMIN — LOSARTAN POTASSIUM 25 MG: 25 TABLET ORAL at 08:45

## 2017-08-03 RX ADMIN — VITAMIN D, TAB 1000IU (100/BT) 1000 UNITS: 25 TAB at 08:47

## 2017-08-03 RX ADMIN — METOPROLOL TARTRATE 12.5 MG: 25 TABLET ORAL at 08:45

## 2017-08-03 RX ADMIN — POTASSIUM CHLORIDE 20 MEQ: 1.5 POWDER, FOR SOLUTION ORAL at 09:31

## 2017-08-03 RX ADMIN — Medication 10 ML: at 21:57

## 2017-08-03 RX ADMIN — FUROSEMIDE 20 MG: 20 TABLET ORAL at 08:45

## 2017-08-03 RX ADMIN — Medication 1 CAPSULE: at 16:04

## 2017-08-03 RX ADMIN — DILTIAZEM HYDROCHLORIDE 30 MG: 30 TABLET, FILM COATED ORAL at 08:44

## 2017-08-03 RX ADMIN — DOCUSATE SODIUM 100 MG: 100 CAPSULE, LIQUID FILLED ORAL at 08:44

## 2017-08-03 RX ADMIN — DILTIAZEM HYDROCHLORIDE 30 MG: 30 TABLET, FILM COATED ORAL at 17:35

## 2017-08-03 RX ADMIN — CEFAZOLIN SODIUM 2 G: 2 SOLUTION INTRAVENOUS at 01:04

## 2017-08-03 RX ADMIN — CEFAZOLIN SODIUM 2 G: 2 SOLUTION INTRAVENOUS at 17:35

## 2017-08-03 RX ADMIN — Medication 325 MG: at 08:45

## 2017-08-03 RX ADMIN — Medication 250 MG: at 08:45

## 2017-08-03 RX ADMIN — CEFAZOLIN SODIUM 2 G: 2 SOLUTION INTRAVENOUS at 09:31

## 2017-08-03 RX ADMIN — GABAPENTIN 300 MG: 300 CAPSULE ORAL at 08:45

## 2017-08-03 RX ADMIN — OXYCODONE HYDROCHLORIDE AND ACETAMINOPHEN 2 TABLET: 10; 325 TABLET ORAL at 21:56

## 2017-08-03 RX ADMIN — Medication 10 ML: at 06:24

## 2017-08-03 RX ADMIN — GABAPENTIN 300 MG: 300 CAPSULE ORAL at 16:04

## 2017-08-03 RX ADMIN — ENOXAPARIN SODIUM 40 MG: 40 INJECTION SUBCUTANEOUS at 16:03

## 2017-08-03 NOTE — PROGRESS NOTES
HH orders noted. Patient pending PT/OT evaluation to determine eligibility for ARU. If patient is medically appropriate, admission will be contingent upon insurance approval.    Wendy Anguiano will continue to follow and assist with referrals as needed upon determination of disposition.

## 2017-08-03 NOTE — PROGRESS NOTES
ARU/IPR REFERRAL CONTACT NOTE  [id number] [place] [address] for Physical Rehabilitation    RE:  Jase Garcia    Referral received to review this patient's case for admission to [id number] [place] [address] for Physical Rehabilitation. Current status reviewed with team and patient meets criteria for admission to [facility] for Physical Rehabilitation pending authorization from [facility] . Case has been opened with [facility]; and is pending review. Writer will notify  of status/determination once obtained. Thank you for this referral.  Should you have any questions please do not hesitate to call. Sincerely,  Denise Smith,BEAU CLARK  [address] [place] [address] Physical Rehabilitation  [phone]

## 2017-08-03 NOTE — PROGRESS NOTES
ARU/IPR REFERRAL CONTACT NOTE  [address] [place] [address] for Physical Rehabilitation    RE: Darline Adam     Thank you for the opportunity to review this patient's case for admission to [id number] [place] [address] for Physical Rehabilitation. Based on our pre-admission screening:     [x ] Our Team/Medical Director is following this case. Comments: Spoke with patient and he is now agreeing to come to ARU if the patient qualifies. Patients PT/OT was put on hold yesterday by pulmonary due to declined in medical status. We will continue to follow the pts PT/OT progress notes when appropriate and medical status and advice following review with the team. Please be aware that if he does qualify with our team his admission to ARU, this admission would be contingent upon approval with his [employer]. Again, Thank you for this referral. Should you have any questions please do not hesitate to call. Sincerely,  Tez Hanson. Leslie Santoro, [address]   Leslie Santoro RN  Admissions [facility] for Physical Rehabilitation  [phone]

## 2017-08-03 NOTE — PROGRESS NOTES
SEVERIANO updated the patient's daughter on acceptance to ARU and petition to insurance for authorization. Patient's daughter also concerned about whether the patient will qualify for Medicaid. She stated the patient has not worked in months. SEVERIANO called Bryn Tolliver with [employer] who stated she will follow up with the patient and daughter tomorrow for consent to send medical records for review for disability application. Additionally, SEVERIANO spoke with the  who will follow up for completion of AMD as the patient is legally  and daughter is coordinating care.

## 2017-08-03 NOTE — PROGRESS NOTES
NUTRITION    Nursing Referral: [facility]     RECOMMENDATIONS / PLAN:     - Resume supplements: Ensure Enlive BID.  - Continue RD inpatient monitoring and evaluation. NUTRITION INTERVENTIONS & DIAGNOSIS:     [x] Meals/Snacks: modified diet  [x] Medical food supplementation: resume   [x] Vitamin/mineral supplementation: ascorbic acid, cholecalciferol, cyanocobalamin, ferrous sulfate, KCl    Nutrition Diagnosis: Unintentional weight loss related to inadequate energy intake as evidenced by 20 lb, 11% weight loss x 4 months. ASSESSMENT:     8/3: Appetite improved, pt ate all of breakfast today and tolerating diet. 7/31: +wound vac. Pt reports fair appetite-breakfast is best meal of the day. Several Ensure bottles at bedside-pt agreeable to decreasing to BID. Per chart review LBM 7/22-pt on bowel meds. 7/26: Diet resumed after procedure yesterday evening. S/p vascular surgery. 7/25: NPO for possible procedure today. Appetite poor for several days PTA. Agreeable to supplements.      Average po intake adequate to meet patients estimated nutritional needs:   [x] Yes     [] No   [] Unable to determine at this time    Diet: DIET CARDIAC Regular       Food Allergies: NKFA  Current Appetite:   [x] Good     [x] Fair     [] Poor     [] Other:  Appetite/meal intake prior to admission:   [] Good     [x] Fair     [x] Poor x 3-4 days PTA, eating well before then    [] Other:  Feeding Limitations:  [] Swallowing difficulty    [x] Chewing difficulty: missing teeth    [] Other:  Current Meal Intake:   Patient Vitals for the past 100 hrs:   % Diet Eaten   08/01/17 1823 100 %   08/01/17 1320 85 %   08/01/17 0733 90 %     BM: 8/3  Skin Integrity: right ankle vascular wound, leg vascular wound, groin vascular wound; wound VAC to groin/leg wound  Edema: none   Pertinent Medications: Reviewed: colace, Miralax, lactobacillus     Recent Labs      08/03/17   0340  08/02/17   0541  08/02/17   0001  08/01/17   0337   NA  134*  135*   -- 133*   K  3.8  3.7   --   3.6   CL  97*  100   --   100   CO2  27  27   --   26   GLU  88  84   --   89   BUN  9  8   --   8   CREA  0.75  0.76   --   0.63   CA  8.3*  8.3*   --   8.2*   MG   --   2.0  2.3   --    PHOS   --   3.9   --    --    ALB  2.2*  2.0*   --   1.9*   SGOT  29  26   --   23   ALT  27  27   --   31       Intake/Output Summary (Last 24 hours) at 08/03/17 1328  Last data filed at 08/03/17 0400   Gross per 24 hour   Intake              120 ml   Output             1700 ml   Net            -1580 ml       Anthropometrics:  Ht Readings from Last 1 Encounters:   07/24/17 5' 10\" (1.778 m)     Last 3 Recorded Weights in this Encounter    08/01/17 0641 08/02/17 0600 08/03/17 0500   Weight: 75.1 kg (165 lb 9.1 oz) 74.7 kg (164 lb 10.9 oz) 55.2 kg (121 lb 11.1 oz)     Body mass index is 17.46 kg/(m^2).       (quesion accuracy of recent weight due to discrepancy and pt reported recent weight)     Weight History: pt reports weight loss PTA, previous weights: 190 lb in March 2017 and 180 lb in April 2017 (20 lb, 11% weight loss x 4 months)    Weight Metrics 8/3/2017 7/24/2017 7/17/2017 7/12/2017 6/27/2017 6/22/2017 6/19/2017   Weight 121 lb 11.1 oz - 156 lb 156 lb 14.4 oz - 165 lb 160 lb   BMI - 17.46 kg/m2 22.38 kg/m2 - 22.51 kg/m2 23.68 kg/m2 22.96 kg/m2        Admitting Diagnosis: Severe sepsis with septic shock  Sepsis (HCC)  DX  Pertinent PMHx: CAD, HTN, PAD    Education Needs:        [x] None identified  [] Identified - Not appropriate at this time  []  Identified and addressed - refer to education log  Learning Limitations:   [x] None identified  [] Identified    Cultural, Sabianism & ethnic food preferences:  [x] None identified    [] Identified and addressed     ESTIMATED NUTRITION NEEDS:     Calories: 5426-7353 kcal (MSJx1.2-1.3) based on  [x] Actual BW 77 kg     [] IBW   Protein: 62-92 gm (0.8-1.2 gm/kg) based on  [x] Actual BW      [] IBW   Fluid: 1 mL/kcal     MONITORING & EVALUATION:     Nutrition Goal(s):   1. Po intake of meals will meet >75% of patient estimated nutritional needs within the next 7 days.   Outcome:  [x] Met/Ongoing    []  Not Met/Progressing    [] New/Initial Goal     Monitoring:   [x] Diet tolerance   [x] Meal intake   [x] Supplement intake   [] GI symptoms/ability to tolerate po diet   [] Respiratory status   [] Plan of care      Previous Recommendations (for follow-up assessments only):     [x]   Implemented       []   Not Implemented (RD to address)     [] No Recommendation Made     Discharge Planning: cardiac diet  [x] Participated in care planning, discharge planning, & interdisciplinary rounds as appropriate      Ruben Barney, [address]   Pager: [phone]

## 2017-08-03 NOTE — PROGRESS NOTES
Patient requested assistance with completing Advance Medical Directive.  consulted with patient and his daughter, Zaira Arevalo and clarified questions and concerns about same. His daughter called patient while  was in the room and  explained the concept of [address]. She expressed understanding of her role as [address]. Patient signed Advance Medical Directive, with original given to patient, a copy left in hospital room for the [address], and copy placed in paper chart to be scanned later into Medical Records. Abram Murray MDiv.   Board Certified [employer] [phone]

## 2017-08-03 NOTE — PROGRESS NOTES
Problem: Mobility Impaired (Adult and Pediatric)  Goal: *Acute Goals and Plan of Care (Insert Text)  Physical Therapy Goals  Initiated 7/27/2017 and to be accomplished within 7 day(s)  Re-evaluated 8/3/17-all goals continued  1. Patient will move from supine to sit and sit to supine , scoot up and down and roll side to side in bed with supervision/set-up. 2. Patient will transfer from bed to chair and chair to bed with supervision/set-up using the least restrictive device. 3. Patient will perform sit to stand with supervision/set-up. 4. Patient will ambulate with supervision/set-up for >100 feet with the least restrictive device. 5. Patient will ascend/descend 4 stairs with 1 handrail(s) with supervision/set-up. Outcome: Progressing Towards Goal  PHYSICAL THERAPY TREATMENT     Patient: Dayo Delcid (38 y.o. male)  Date: 8/3/2017  Diagnosis: Severe sepsis with septic shock  Sepsis ([facility] [address]  DX <principal problem not specified>  Procedure(s) (LRB):  REMOVAL OF LEFT LEG INFECTED GRAFT (Left) 9 Days Post-Op  Precautions: Fall   Chart, physical therapy assessment, plan of care and goals were reviewed. ASSESSMENT:  Patient continues to progress with functional mobility with PT. Patient received sitting up in bed. Patient currently on 10LNC ([facility]) for supplemental O2, continues to have wound vac placement on L LE. Patient required min A for bed mobility, manual assistance for management of L LE OOB. Patient transitioned into standing min A with RW. Patient able to ambulate ~ 45 ft, min A with RW, 2 episodes of LOB, requiring increased PT assistance for safety. Patient reports that he feels like his legs \"give out\" on him, reports increased burning/throbbing with ambulation. Patient able to return to sit in chair at bedside. Post activity VS /90, HR 73 bpm, O2 99% on 10LNC. Will continue to progress patient as tolerated.   Progression toward goals:  [ ]      Improving appropriately and progressing toward goals  [X]      Improving slowly and progressing toward goals  [ ]      Not making progress toward goals and plan of care will be adjusted       PLAN:  Patient continues to benefit from skilled intervention to address the above impairments. Continue treatment per established plan of care. Discharge Recommendations:  Rehab  Further Equipment Recommendations for Discharge:  rolling walker       SUBJECTIVE:   Patient stated I think I am doing okay today.       OBJECTIVE DATA SUMMARY:   Critical Behavior:  Neurologic State: Alert, Appropriate for age  Orientation Level: Oriented X4  Cognition: Follows commands  Safety/Judgement: Fall prevention  Functional Mobility Training:  Bed Mobility:  Rolling: Contact guard assistance  Supine to Sit: Minimum assistance  Sit to Supine: Minimum assistance  Scooting: Contact guard assistance              Interventions: Verbal cues  Transfers:  Sit to Stand: Minimum assistance; Additional time (with RW)  Stand to Sit: Minimum assistance; Additional time (with RW)  Balance:  Sitting: Intact  Standing: Impaired;Pull to stand; With support  Standing - Static: Fair;Constant support (with RW)  Standing - Dynamic : Poor (with RW)  Ambulation/Gait Training:  Distance (ft): 45 Feet (ft)  Assistive Device: Walker, rolling  Ambulation - Level of Assistance: Minimal assistance; Additional time  Gait Abnormalities: Antalgic;Decreased step clearance; Step to gait  Base of Support: Narrowed  Stance: Left decreased  Speed/Suzanna: Pace decreased (<100 feet/min); Slow  Step Length: Left shortened;Right shortened  Interventions: Verbal cues; Tactile cues;Manual cues  Pain:  Pain Scale 1: Numeric (0 - 10)  Pain Intensity 1: 8  Pain Location 1: Leg;Groin  Pain Orientation 1: Left;Right  Pain Description 1: Gnawing; [place]intia Smiles; Stabbing;Tingling  Pain Intervention(s) 1: Medication (see MAR); Distraction;Relaxation technique; Rest;Repositioned  Activity Tolerance:   Fair/good  Please refer to the flowsheet for vital signs taken during this treatment.   After treatment:   [X] Patient left in no apparent distress sitting up in chair  [ ] Patient left in no apparent distress in bed  [X] Call bell left within reach  [X] Nursing notified  [ ] Caregiver present  [ ] Bed alarm activated      Yenni Pointer, PT   Time Calculation: 23 mins

## 2017-08-03 NOTE — PROGRESS NOTES
Problem: Mobility Impaired (Adult and Pediatric)  Goal: *Acute Goals and Plan of Care (Insert Text)  Physical Therapy Goals  Initiated 7/27/2017 and to be accomplished within 7 day(s)  1. Patient will move from supine to sit and sit to supine , scoot up and down and roll side to side in bed with supervision/set-up. 2. Patient will transfer from bed to chair and chair to bed with supervision/set-up using the least restrictive device. 3. Patient will perform sit to stand with supervision/set-up. 4. Patient will ambulate with supervision/set-up for >100 feet with the least restrictive device. 5. Patient will ascend/descend 4 stairs with 1 handrail(s) with supervision/set-up. Outcome: Progressing Towards Goal  PHYSICAL THERAPY RE-EVALUATION AND TREATMENT     Patient: Kellie Gonzalez (89 y.o. male)  Date: 8/3/2017  Diagnosis: Severe sepsis with septic shock  Sepsis ([facility] [address]  DX <principal problem not specified>  Procedure(s) (LRB):  REMOVAL OF LEFT LEG INFECTED GRAFT (Left) 9 Days Post-Op  Precautions: Fall      ASSESSMENT:  Patient's progression toward goals since last assessment: Patient was transferred to ICU for fluid overload, cleared by pulmonary to proceed with PT. Patient continues to have wound vac to L LE. Patient continues to require min A with bed mobility, occ assist for L LE on/off bed. Patient transitioned into standing min A with RW, mild LOB with initial standing, min A from PT for correction. Patient able to completed sidesteps at [facility], [id number] with RW. Patient with increased c/o pain in L LE with standing and ambulation. Transport arrived to take patient off unit for xray, assisted patient back to bed. Patient would benefit from continued PT to address above impairments and assist with discharge planning. PLAN:  Goals have been updated based on progression since last assessment. Patient continues to benefit from skilled intervention to address the above impairments.   Continue to follow the patient 1-2 times per day/4-7 days per week x 4-7 x week to address goals. Planned Interventions:  [X]     Bed Mobility Training          [X]     Neuromuscular Re-Education  [X]     Transfer Training                [ ]    Orthotic/Prosthetic Training  [X]     Gait Training                       [ ]     Modalities  [X]     Therapeutic Exercises       [ ]     Edema Management/Control  [X]     Therapeutic Activities         [X]     Patient and Family Training/Education  [ ]     Other (comment):  Discharge Recommendations: Rehab  Further Equipment Recommendations for Discharge: rolling walker       SUBJECTIVE:   Patient stated I think I am doing pretty good.       OBJECTIVE DATA SUMMARY:   Critical Behavior:  Neurologic State: Alert, Appropriate for age  Orientation Level: Appropriate for age, Oriented X4  Cognition: Appropriate decision making, Appropriate for age attention/concentration, Appropriate safety awareness, Follows commands  Safety/Judgement: Awareness of environment, Fall prevention  Functional Mobility Training:  Bed Mobility:  Rolling: Contact guard assistance  Supine to Sit: Minimum assistance  Sit to Supine: Minimum assistance  Scooting: Contact guard assistance  Interventions: Verbal cues  Transfers:  Sit to Stand: Minimum assistance; Additional time  Stand to Sit: Contact guard assistance; Additional time  Balance:  Sitting: Intact  Standing: Impaired;Pull to stand; With support (with RW)  Standing - Static: Fair;Constant support (with RW)  Standing - Dynamic : Fair (with RW)  Ambulation/Gait Training:  Distance (ft):  (sidesteps at [employer])  Assistive Device: Walker, rolling  Ambulation - Level of Assistance: Contact guard assistance  Gait Abnormalities: Antalgic;Decreased step clearance  Base of Support: Narrowed  Stance: Left decreased  Speed/Suzanna: Pace decreased (<100 feet/min)  Step Length: Left shortened;Right shortened  Interventions: Verbal cues  Pain:  Pain Scale 1: Numeric (0 - 10)  Pain Intensity 1: 6  Pain Location 1: Leg  Pain Orientation 1: Left  Pain Description 1: Constant;Burning;Aching  Pain Intervention(s) 1: Medication (see MAR); Repositioned  Activity Tolerance:   Good  Please refer to the flowsheet for vital signs taken during this treatment. After treatment:   [ ]  Patient left in no apparent distress sitting up in chair  [X]  Patient left in no apparent distress in bed  [X]  Call bell left within reach  [X]  Nursing notified  [ ]  Caregiver present  [ ]  Bed alarm activated     Subhash Booth PT   Time Calculation: 16 mins      G-codes:  Mobility  Current  CJ= 20-39%   Goal  CI= 1-19%. The severity rating is based on the Level of Assistance required for Functional Mobility and ADLs.

## 2017-08-03 NOTE — PROGRESS NOTES
Problem: Self Care Deficits Care Plan (Adult)  Goal: *Acute Goals and Plan of Care (Insert Text)  Occupational Therapy Goals  Initiated 8/3/2017 within 7 day(s). 1. Patient will perform LE bathing with supervision  2. Patient will perform toilet transfers/toileting with supervision using rolling walker  3. Patient will perform lower body dressing with supervision  Outcome: Progressing Towards Goal  OCCUPATIONAL THERAPY REEVALUATION     Patient: Stanley Domingo (65 y.o. male)  Date: 8/3/2017  Diagnosis: Severe sepsis with septic shock  Sepsis ([facility] [address]  DX <principal problem not specified>  Procedure(s) (LRB):  REMOVAL OF LEFT LEG INFECTED GRAFT (Left) 9 Days Post-Op  Precautions: Fall, WBAT LLE      ASSESSMENT :  Based on the objective data described below, the patient was supine in bed upon arrival. Patient sat EOB with Min A/CGA and additional time for bed mobility. Patient's BUE AROM and strength were WFL. Patient simulated LE dressing tasks with Min A. Patient simulated BSC transfer with Min A, progressing to CGA, and additional time using rolling walker. Patient's goals have been updated in response to progress and would benefit from rehab for increased independence in ADLs. Patient was left comfortable in bed with transport team present. Patient will benefit from skilled intervention to address the above impairments.   Patients rehabilitation potential is considered to be Excellent  Factors which may influence rehabilitation potential include:   [ ]                None noted  [ ]                Mental ability/status  [X]                Medical condition  [ ]                Home/family situation and support systems  [ ]                Safety awareness  [ ]                Pain tolerance/management  [ ]                Other:        PLAN :  Recommendations and Planned Interventions:  [X]                  Self Care Training                  [X]           Therapeutic Activities  [X]                  Functional Mobility Training    [ ]           Cognitive Retraining  [X]                  Therapeutic Exercises           [X]           Endurance Activities  [ ]                  Balance Training                   [ ]           Neuromuscular Re-Education  [ ]                  Visual/Perceptual Training     [X]      Home Safety Training  [X]                  Patient Education                 [X]           Family Training/Education  [ ]                  Other (comment):     Frequency/Duration: Patient will be followed by occupational therapy 1-2 times per day/ 3-5 days per week to address goals. Discharge Recommendations: Rehab  Further Equipment Recommendations for Discharge: TBD       G-CODES:      Self Care  Current  CJ= 20-39%   Goal  CI= 1-19%. The severity rating is based on the Level of Assistance required for Functional Mobility and ADLs. SUBJECTIVE:   Patient stated Yes ma'am, I can reach my toes.       OBJECTIVE DATA SUMMARY:   Hospital course since last seen and reason for reevaluation: Length of stay/Progression of patient  Cognitive/Behavioral Status:  Neurologic State: Alert  Orientation Level: Oriented X4  Cognition: Follows commands  Safety/Judgement: Awareness of environment, Fall prevention     Skin: No skin changes noted     Edema: No edema noted     Vision/Perceptual:    Acuity: Within Defined Limits (not formally assessed)       Coordination:  Coordination: Within functional limits (BUEs)     Balance:  Sitting: Intact  Standing: Impaired; With support  Standing - Static: Fair  Standing - Dynamic : Fair     Strength:  Strength:  Within functional limits (BUEs)     Tone & Sensation:  Tone: Normal (BUEs)  Sensation: Intact (BUEs)     Range of Motion:  AROM: Within functional limits (BUEs)     Functional Mobility and Transfers for ADLs:  Bed Mobility:  Rolling: Contact guard assistance  Supine to Sit: Minimum assistance  Sit to Supine: Minimum assistance  Scooting: Contact guard assistance  Transfers:  Sit to Stand: Minimum assistance; Additional time              Stand to Sit: Contact guard assistance              Toilet Transfer : Minimum assistance; Additional time (simulated BSC transfer with rw)     ADL Assessment:  Feeding: Independent     Oral Facial Hygiene/Grooming: Minimum assistance (at sink with rolling walker)     Bathing: Moderate assistance     Upper Body Dressing: Modified independent (seated)     Lower Body Dressing: Minimum assistance     Toileting: Minimum assistance        Pain:  Pt reports 5/10 pain or discomfort prior to treatment. Pt reports 8/10 pain or discomfort post treatment. Activity Tolerance:   Good     Please refer to the flowsheet for vital signs taken during this treatment. After treatment:   [ ] Patient left in no apparent distress sitting up in chair  [X] Patient left in no apparent distress in bed  [X] Call bell left within reach  [X] Transport team present  [ ] Caregiver present  [ ] Bed alarm activated      COMMUNICATION/EDUCATION:   [ ]    Home safety education was provided and the patient/caregiver indicated understanding. [X]    Patient/family have participated as able in goal setting and plan of care. [X]    Patient/family agree to work toward stated goals and plan of care. [ ]    Patient understands intent and goals of therapy, but is neutral about his/her participation. [ ]    Patient is unable to participate in goal setting and plan of care. This patients plan of care is appropriate for delegation to [facility].      Thank you for this referral.  DRE Aguilar  Time Calculation: 16 mins

## 2017-08-03 NOTE — ROUTINE PROCESS
Bedside shift change report given to Selvin Broussard (oncoming nurse) by Juliana Serra (offgoing nurse). Report included the following information SBAR, Kardex, Intake/Output and MAR.

## 2017-08-03 NOTE — PROGRESS NOTES
Infectious Disease progress Note    Requested by: Dr Lani pSencer    Reason: sepsis, mssa bacteremia    Current abx Prior abx   Nafcillin since 7/28-8/2  Cefazolin since 8/2 Pip/tazo, vancomycin 7/24-7/28     Lines:       Assessment :    Mari Mata is a 59 y.o. male with CAD, severe peripheral arterial insufficiency admitted to [facility] on 7/24/17 with gram positive bacteremia, exposed left groin vascular graft. Clinical picture c/w sepsis (POA) due to MSSA bloodstream infection (positive blood cultures 7/22, 7/24; negative blood cultures 7/28). Most likely source of MSSA bacteremia is infected left groin vascular graft. Patient appropriately underwent removal of infected graft, Repair of superficial femoral artery, left side; Repair of common femoral artery on 7/25/17. Intra op cx: MSSA. Transferred to ICU overnight for respiratory distress - likely volume overload from nafcillin - improved with diuresis    Recommendations:    1. cont cefazolin till 9/12/17  2. F/u vascular surgery recommendations  3. D/c planning per primary team    outpt iv abx orders written    Advance care plan: full code  Primary Decision Maker Name aRshad Nolasco   Primary Decision Maker Phone Number        Above plan was discussed in details with patient, rn. Please call me if any further questions or concerns. Will continue to participate in the care of this patient. subjective:    Feels better. No sob. intermittent pain at the recent surgical site left groin. Patient denies headaches, visual disturbances, sore throat, runny nose, earaches, cp, sob, chills, cough, abdominal pain, diarrhea, burning micturition,  or weakness in extremities. He denies back pain/flank pain. Home Medication List    Details   amiodarone (CORDARONE) 200 mg tablet Take 1 Tab by mouth daily. Indications: VENTRICULAR RATE CONTROL IN ATRIAL FIBRILLATION  Qty: 60 Tab, Refills: 0      clopidogrel (PLAVIX) 75 mg tab Take 1 Tab by mouth daily.   Qty: 90 Tab, Refills: 3      oxyCODONE-acetaminophen (PERCOCET) 5-325 mg per tablet Take 2 Tabs by mouth every four (4) hours as needed. Max Daily Amount: 12 Tabs. Qty: 60 Tab, Refills: 0      gabapentin (NEURONTIN) 100 mg capsule Take 1 Cap by mouth two (2) times a day. Indications: NEUROPATHIC PAIN  Qty: 60 Cap, Refills: 9      aspirin 81 mg chewable tablet Take 1 Tab by mouth daily (with breakfast). Qty: 90 Tab, Refills: 3      cholecalciferol (VITAMIN D3) 1,000 unit tablet Take 1 Tab by mouth daily. Qty: 90 Tab, Refills: 3      losartan (COZAAR) 25 mg tablet Take 1 Tab by mouth daily. Indications: Chronic Heart Failure, hypertension  Qty: 90 Tab, Refills: 2      ascorbic acid, vitamin C, (VITAMIN C) 250 mg tablet Take 1 Tab by mouth daily (with breakfast). Qty: 90 Tab, Refills: 2      DULoxetine (CYMBALTA) 60 mg capsule Take 1 Cap by mouth daily. Qty: 90 Cap, Refills: 2      zinc sulfate (ZINCATE) 220 (50) mg capsule Take 1 Cap by mouth daily. Qty: 90 Cap, Refills: 0      ferrous sulfate 325 mg (65 mg iron) tablet Take 1 Tab by mouth daily (with breakfast). Qty: 90 Tab, Refills: 3      atorvastatin (LIPITOR) 40 mg tablet Take 1 Tab by mouth nightly. Indications: DYSLIPIDEMIA  Qty: 90 Tab, Refills: 3      metoprolol tartrate (LOPRESSOR) 25 mg tablet Take 0.5 Tabs by mouth every twelve (12) hours. Indications: hypertension, VENTRICULAR RATE CONTROL IN ATRIAL FIBRILLATION  Qty: 30 Tab, Refills: 6      dilTIAZem (CARDIZEM) 30 mg tablet Take 1 Tab by mouth Before breakfast, lunch, dinner and at bedtime. Indications: hypertension  Qty: 120 Tab, Refills: 6      cyanocobalamin (VITAMIN B-12) 1,000 mcg tablet Take 1 Tab by mouth daily.   Qty: 90 Tab, Refills: 3             Current Facility-Administered Medications   Medication Dose Route Frequency    glucose chewable tablet 16 g  4 Tab Oral PRN    glucagon (GLUCAGEN) injection 1 mg  1 mg IntraMUSCular PRN    dextrose (D50W) injection syrg 12.5-25 g  25-50 mL IntraVENous PRN    furosemide (LASIX) tablet 20 mg  20 mg Oral DAILY    potassium chloride (KLOR-CON) packet 20 mEq  20 mEq Oral BID WITH MEALS    ceFAZolin (ANCEF) 2g IVPB in 50 mL D5W  2 g IntraVENous Q8H    WARFARIN INFORMATION NOTE (COUMADIN)   Other Q24H    gabapentin (NEURONTIN) capsule 300 mg  300 mg Oral TID    oxyCODONE-acetaminophen (PERCOCET 10)  mg per tablet 1-2 Tab  1-2 Tab Oral Q4H PRN    polyethylene glycol (MIRALAX) packet 17 g  17 g Oral DAILY    0.9% sodium chloride infusion 250 mL  250 mL IntraVENous PRN    sodium chloride (NS) flush 5-10 mL  5-10 mL IntraVENous Q8H    sodium chloride (NS) flush 5-10 mL  5-10 mL IntraVENous PRN    naloxone (NARCAN) injection 0.1 mg  0.1 mg IntraVENous PRN    atorvastatin (LIPITOR) tablet 40 mg  40 mg Oral QHS    acetaminophen (TYLENOL) tablet 650 mg  650 mg Oral Q4H PRN    HYDROmorphone (PF) (DILAUDID) injection 1 mg  1 mg IntraVENous Q3H PRN    diphenhydrAMINE (BENADRYL) injection 12.5 mg  12.5 mg IntraVENous Q4H PRN    ondansetron (ZOFRAN) injection 4 mg  4 mg IntraVENous Q4H PRN    docusate sodium (COLACE) capsule 100 mg  100 mg Oral DAILY    enoxaparin (LOVENOX) injection 40 mg  40 mg SubCUTAneous Q24H    amiodarone (CORDARONE) tablet 200 mg  200 mg Oral DAILY    ascorbic acid (vitamin C) (VITAMIN C) tablet 250 mg  250 mg Oral DAILY    aspirin chewable tablet 81 mg  81 mg Oral DAILY    cholecalciferol (VITAMIN D3) tablet 1,000 Units  1,000 Units Oral DAILY    cyanocobalamin tablet 1,000 mcg  1,000 mcg Oral DAILY    dilTIAZem (CARDIZEM) IR tablet 30 mg  30 mg Oral QID    DULoxetine (CYMBALTA) capsule 60 mg  60 mg Oral DAILY    ferrous sulfate tablet 325 mg  1 Tab Oral DAILY WITH BREAKFAST    metoprolol tartrate (LOPRESSOR) tablet 12.5 mg  12.5 mg Oral Q12H    losartan (COZAAR) tablet 25 mg  25 mg Oral DAILY       Allergies: Review of patient's allergies indicates no known allergies.     Temp (24hrs), Av.2 °F (36.8 °C), Min:97.7 °F (36.5 °C), Max:98.7 °F (37.1 °C)    Visit Vitals    BP (!) 168/94 (BP 1 Location: Left arm)    Pulse 70    Temp 98.6 °F (37 °C)    Resp 21    Ht 5' 10\" (1.778 m)    Wt 55.2 kg (121 lb 11.1 oz)    SpO2 (!) 81%    BMI 17.46 kg/m2       ROS: 12 point ROS obtained in details. Pertinent positives as mentioned in HPI,   otherwise negative    Physical Exam:    General: Well developed, well nourished male laying on the bed AAOx3 in no acute distress. General:   awake alert and oriented   HEENT:  Normocephalic, atraumatic, PERRL, EOMI, no scleral icterus or pallor; no conjunctival hemmohage;  nasal and oral mucous are moist and without evidence of lesions. No thrush. Neck supple, no bruits. Lymph Nodes:   no cervical, axillary or inguinal adenopathy   Lungs:   non-labored, bilaterally clear to auscultation- no crackles wheezes rales or rhonchi   Heart:  RRR, s1 and s2; no  rubs or gallops, no edema, + pedal pulses   Abdomen:  soft, non-distended, active bowel sounds, no hepatomegaly, no splenomegaly. Non-tender   Genitourinary:  deferred   Extremities:   no clubbing, cyanosis; no joint effusions or swelling; wound vac left groin; muscle mass appropriate for age, LLE cool to touch. Tenderness on palpation of shin LLE   Neurologic:  No gross focal sensory abnormalities; 5/5 muscle strength to upper and lower extremities. Speech appropriate.  Cranial nerves intact                        Skin:  Surgical changes left groin   Back:  no spinal or paraspinal muscle tenderness or rigidity, no CVA tenderness     Psychiatric:  No suicidal or homicidal ideations, appropriate mood and affect         Labs: Results:   Chemistry Recent Labs      08/03/17   0340  08/02/17   0541  08/01/17   0337   GLU  88  84  89   NA  134*  135*  133*   K  3.8  3.7  3.6   CL  97*  100  100   CO2  27  27  26   BUN  9  8  8   CREA  0.75  0.76  0.63   CA  8.3*  8.3*  8.2*   AGAP  10  8  7   BUCR  12  11*  13   AP  692*  696*  791* TP  6.9  6.6  6.5   ALB  2.2*  2.0*  1.9*   GLOB  4.7*  4.6*  4.6*   AGRAT  0.5*  0.4*  0.4*      CBC w/Diff Recent Labs      08/03/17   0340  08/02/17   0541  08/02/17   0001   WBC  12.2  15.4*  16.2*   RBC  3.01*  2.84*  3.41*   HGB  8.4*  8.1*  9.8*   HCT  25.5*  24.3*  29.8*   PLT  468*  474*  614*   GRANS  77*  82*  69   LYMPH  12*  11*  22   EOS  1  0  1      Microbiology Recent Labs      08/02/17   0544  08/02/17   0541   CULT  NO GROWTH 1 DAY  NO GROWTH 1 DAY          RADIOLOGY:    All available imaging studies/reports in [facility] care for this admission were reviewed    Dr. Taisha Guzman, Infectious Disease Specialist  [phone]  August 3, 2017  4:21 PM

## 2017-08-03 NOTE — ACP (ADVANCE CARE PLANNING)
Patient requested assistance with completing Advance Medical Directive.  consulted with patient and clarified questions and concerns about same. Patient signed Advance Medical Directive, with original given to patient, a copy left for Medical Power of , and copy placed in paper chart to be scanned later into Medical Records. Abram Murray MDiv.   Board Certified [employer] [phone]

## 2017-08-03 NOTE — HOME CARE
Home IV antibiotic orders sent to HCP thru e-discharge. Brii Jerome in intake with HCP called and spoke with this nurse regarding tentative discharge orders - no discharge orders noted as of yet but a consult has been placed to ARU [facility] liaison nurses will continue to follow for any continued home care needs - TWILA Flynn LPN

## 2017-08-04 ENCOUNTER — DOCUMENTATION ONLY (OUTPATIENT)
Dept: VASCULAR SURGERY | Age: 57
End: 2017-08-04

## 2017-08-04 LAB
ALBUMIN SERPL BCP-MCNC: 2.3 G/DL (ref 3.4–5)
ALBUMIN/GLOB SERPL: 0.5 {RATIO} (ref 0.8–1.7)
ALP SERPL-CCNC: 649 U/L (ref 45–117)
ALT SERPL-CCNC: 29 U/L (ref 16–61)
ANION GAP BLD CALC-SCNC: 10 MMOL/L (ref 3–18)
AST SERPL W P-5'-P-CCNC: 57 U/L (ref 15–37)
BASOPHILS # BLD AUTO: 0 K/UL (ref 0–0.1)
BASOPHILS # BLD: 0 % (ref 0–2)
BILIRUB SERPL-MCNC: 1.1 MG/DL (ref 0.2–1)
BUN SERPL-MCNC: 8 MG/DL (ref 7–18)
BUN/CREAT SERPL: 11 (ref 12–20)
CALCIUM SERPL-MCNC: 8.5 MG/DL (ref 8.5–10.1)
CHLORIDE SERPL-SCNC: 96 MMOL/L (ref 100–108)
CO2 SERPL-SCNC: 26 MMOL/L (ref 21–32)
CREAT SERPL-MCNC: 0.75 MG/DL (ref 0.6–1.3)
DIFFERENTIAL METHOD BLD: ABNORMAL
EOSINOPHIL # BLD: 0 K/UL (ref 0–0.4)
EOSINOPHIL NFR BLD: 0 % (ref 0–5)
ERYTHROCYTE [DISTWIDTH] IN BLOOD BY AUTOMATED COUNT: 19.2 % (ref 11.6–14.5)
GLOBULIN SER CALC-MCNC: 4.8 G/DL (ref 2–4)
GLUCOSE BLD STRIP.AUTO-MCNC: 105 MG/DL (ref 70–110)
GLUCOSE BLD STRIP.AUTO-MCNC: 133 MG/DL (ref 70–110)
GLUCOSE BLD STRIP.AUTO-MCNC: 138 MG/DL (ref 70–110)
GLUCOSE BLD STRIP.AUTO-MCNC: 83 MG/DL (ref 70–110)
GLUCOSE SERPL-MCNC: 90 MG/DL (ref 74–99)
HCT VFR BLD AUTO: 25.5 % (ref 36–48)
HGB BLD-MCNC: 8.5 G/DL (ref 13–16)
INR PPP: 1.4 (ref 0.8–1.2)
LYMPHOCYTES # BLD AUTO: 19 % (ref 21–52)
LYMPHOCYTES # BLD: 2.2 K/UL (ref 0.9–3.6)
MCH RBC QN AUTO: 28.7 PG (ref 24–34)
MCHC RBC AUTO-ENTMCNC: 33.3 G/DL (ref 31–37)
MCV RBC AUTO: 86.1 FL (ref 74–97)
MONOCYTES # BLD: 1.1 K/UL (ref 0.05–1.2)
MONOCYTES NFR BLD AUTO: 10 % (ref 3–10)
NEUTS SEG # BLD: 8.3 K/UL (ref 1.8–8)
NEUTS SEG NFR BLD AUTO: 71 % (ref 40–73)
PLATELET # BLD AUTO: 522 K/UL (ref 135–420)
PMV BLD AUTO: 9.5 FL (ref 9.2–11.8)
POTASSIUM SERPL-SCNC: 3.9 MMOL/L (ref 3.5–5.5)
PROT SERPL-MCNC: 7.1 G/DL (ref 6.4–8.2)
PROTHROMBIN TIME: 16.7 SEC (ref 11.5–15.2)
RBC # BLD AUTO: 2.96 M/UL (ref 4.7–5.5)
SODIUM SERPL-SCNC: 132 MMOL/L (ref 136–145)
WBC # BLD AUTO: 11.8 K/UL (ref 4.6–13.2)

## 2017-08-04 PROCEDURE — 65270000029 HC RM PRIVATE

## 2017-08-04 PROCEDURE — 85025 COMPLETE CBC W/AUTO DIFF WBC: CPT | Performed by: SURGERY

## 2017-08-04 PROCEDURE — 77030019952 HC CANSTR VAC ASST KCON -B

## 2017-08-04 PROCEDURE — 80053 COMPREHEN METABOLIC PANEL: CPT | Performed by: SURGERY

## 2017-08-04 PROCEDURE — 74011250637 HC RX REV CODE- 250/637: Performed by: PHYSICIAN ASSISTANT

## 2017-08-04 PROCEDURE — 74011250637 HC RX REV CODE- 250/637: Performed by: INTERNAL MEDICINE

## 2017-08-04 PROCEDURE — 74011250636 HC RX REV CODE- 250/636: Performed by: INTERNAL MEDICINE

## 2017-08-04 PROCEDURE — 85610 PROTHROMBIN TIME: CPT | Performed by: SURGERY

## 2017-08-04 PROCEDURE — 74011250637 HC RX REV CODE- 250/637: Performed by: SURGERY

## 2017-08-04 PROCEDURE — 97530 THERAPEUTIC ACTIVITIES: CPT

## 2017-08-04 PROCEDURE — 97116 GAIT TRAINING THERAPY: CPT

## 2017-08-04 PROCEDURE — 36592 COLLECT BLOOD FROM PICC: CPT

## 2017-08-04 PROCEDURE — 74011250636 HC RX REV CODE- 250/636: Performed by: PHYSICIAN ASSISTANT

## 2017-08-04 PROCEDURE — 77030019934 HC DRSG VAC ASST KCON -B

## 2017-08-04 PROCEDURE — 82962 GLUCOSE BLOOD TEST: CPT

## 2017-08-04 PROCEDURE — 97535 SELF CARE MNGMENT TRAINING: CPT

## 2017-08-04 RX ORDER — WARFARIN SODIUM 5 MG/1
5 TABLET ORAL ONCE
Status: COMPLETED | OUTPATIENT
Start: 2017-08-04 | End: 2017-08-04

## 2017-08-04 RX ADMIN — DILTIAZEM HYDROCHLORIDE 30 MG: 30 TABLET, FILM COATED ORAL at 14:15

## 2017-08-04 RX ADMIN — HYDROMORPHONE HYDROCHLORIDE 1 MG: 1 INJECTION, SOLUTION INTRAMUSCULAR; INTRAVENOUS; SUBCUTANEOUS at 14:45

## 2017-08-04 RX ADMIN — Medication 325 MG: at 10:20

## 2017-08-04 RX ADMIN — Medication 10 ML: at 05:09

## 2017-08-04 RX ADMIN — OXYCODONE HYDROCHLORIDE AND ACETAMINOPHEN 2 TABLET: 10; 325 TABLET ORAL at 03:46

## 2017-08-04 RX ADMIN — METOPROLOL TARTRATE 12.5 MG: 25 TABLET ORAL at 21:06

## 2017-08-04 RX ADMIN — CEFAZOLIN SODIUM 2 G: 2 SOLUTION INTRAVENOUS at 17:11

## 2017-08-04 RX ADMIN — HYDROMORPHONE HYDROCHLORIDE 1 MG: 1 INJECTION, SOLUTION INTRAMUSCULAR; INTRAVENOUS; SUBCUTANEOUS at 00:02

## 2017-08-04 RX ADMIN — METOPROLOL TARTRATE 12.5 MG: 25 TABLET ORAL at 10:20

## 2017-08-04 RX ADMIN — HYDROMORPHONE HYDROCHLORIDE 1 MG: 1 INJECTION, SOLUTION INTRAMUSCULAR; INTRAVENOUS; SUBCUTANEOUS at 17:09

## 2017-08-04 RX ADMIN — OXYCODONE HYDROCHLORIDE AND ACETAMINOPHEN 2 TABLET: 10; 325 TABLET ORAL at 23:42

## 2017-08-04 RX ADMIN — GABAPENTIN 300 MG: 300 CAPSULE ORAL at 10:20

## 2017-08-04 RX ADMIN — DOCUSATE SODIUM 100 MG: 100 CAPSULE, LIQUID FILLED ORAL at 10:20

## 2017-08-04 RX ADMIN — OXYCODONE HYDROCHLORIDE AND ACETAMINOPHEN 2 TABLET: 10; 325 TABLET ORAL at 19:36

## 2017-08-04 RX ADMIN — CYANOCOBALAMIN TAB 1000 MCG 1000 MCG: 1000 TAB at 10:20

## 2017-08-04 RX ADMIN — DILTIAZEM HYDROCHLORIDE 30 MG: 30 TABLET, FILM COATED ORAL at 17:11

## 2017-08-04 RX ADMIN — CEFAZOLIN SODIUM 2 G: 2 SOLUTION INTRAVENOUS at 10:32

## 2017-08-04 RX ADMIN — OXYCODONE HYDROCHLORIDE AND ACETAMINOPHEN 2 TABLET: 10; 325 TABLET ORAL at 15:24

## 2017-08-04 RX ADMIN — CEFAZOLIN SODIUM 2 G: 2 SOLUTION INTRAVENOUS at 03:00

## 2017-08-04 RX ADMIN — Medication 250 MG: at 10:19

## 2017-08-04 RX ADMIN — POLYETHYLENE GLYCOL 3350 17 G: 17 POWDER, FOR SOLUTION ORAL at 10:20

## 2017-08-04 RX ADMIN — WARFARIN SODIUM 5 MG: 5 TABLET ORAL at 17:11

## 2017-08-04 RX ADMIN — DULOXETINE HYDROCHLORIDE 60 MG: 60 CAPSULE, DELAYED RELEASE ORAL at 10:19

## 2017-08-04 RX ADMIN — POTASSIUM CHLORIDE 20 MEQ: 1.5 POWDER, FOR SOLUTION ORAL at 10:20

## 2017-08-04 RX ADMIN — GABAPENTIN 300 MG: 300 CAPSULE ORAL at 21:05

## 2017-08-04 RX ADMIN — Medication 1 CAPSULE: at 10:32

## 2017-08-04 RX ADMIN — VITAMIN D, TAB 1000IU (100/BT) 1000 UNITS: 25 TAB at 10:20

## 2017-08-04 RX ADMIN — DILTIAZEM HYDROCHLORIDE 30 MG: 30 TABLET, FILM COATED ORAL at 21:06

## 2017-08-04 RX ADMIN — Medication 10 ML: at 14:00

## 2017-08-04 RX ADMIN — FUROSEMIDE 20 MG: 20 TABLET ORAL at 10:20

## 2017-08-04 RX ADMIN — LOSARTAN POTASSIUM 25 MG: 25 TABLET ORAL at 10:19

## 2017-08-04 RX ADMIN — POTASSIUM CHLORIDE 20 MEQ: 1.5 POWDER, FOR SOLUTION ORAL at 17:11

## 2017-08-04 RX ADMIN — Medication 10 ML: at 21:07

## 2017-08-04 RX ADMIN — ENOXAPARIN SODIUM 40 MG: 40 INJECTION SUBCUTANEOUS at 17:11

## 2017-08-04 RX ADMIN — ASPIRIN 81 MG 81 MG: 81 TABLET ORAL at 10:20

## 2017-08-04 RX ADMIN — DILTIAZEM HYDROCHLORIDE 30 MG: 30 TABLET, FILM COATED ORAL at 10:19

## 2017-08-04 RX ADMIN — AMIODARONE HYDROCHLORIDE 200 MG: 200 TABLET ORAL at 10:20

## 2017-08-04 RX ADMIN — ATORVASTATIN CALCIUM 40 MG: 40 TABLET, FILM COATED ORAL at 21:06

## 2017-08-04 RX ADMIN — GABAPENTIN 300 MG: 300 CAPSULE ORAL at 15:24

## 2017-08-04 NOTE — PROGRESS NOTES
Patient has been accepted to ARU and authorization has been obtained. Patient can be transferred once medically stable. SEVERIANO contacted physician's office who check with the provider and will get back with this SW.     1130: Per Dr. Latia Fox patient is anticipated to be medically appropriate for transfer to ARU on Monday. Erica notified. Additionally, wound wac paper placed on paper chart for MD signature for use upon discharge from ARU.

## 2017-08-04 NOTE — PROGRESS NOTES
ARU/IPR REFERRAL CONTACT NOTE  [id number] [place] [address] for Physical Rehabilitation    RE: Ceasar Grady     Thank you for the opportunity to review this patient's case for admission to [id number] [place] [address] for Physical Rehabilitation. Based on our pre-admission screening:       [x] This patient meets criteria for admission to [facility] for Physical      Rehabilitation. We received authorization from [facility] for admission to ARU once patient is medically stable. Notified the caremanager. She is following up with the MD to determine when plans for DC. I also requested that the wound vac paperwork be signed by the physician prior to transfering to ARU. Again, Thank you for this referral. Should you have any questions please do not hesitate to call. Sincerely,  Soy Bedolla. Cat Bass, [address]   Cat Bass, RN  Admissions [facility] for Physical Rehabilitation  [phone]

## 2017-08-04 NOTE — PROGRESS NOTES
4402  Received report. Patient resting in bed with no acute distress noted. Wound vac intact to LLE draining small amount of serosanguinous fluid, denies pain. Call light within reach. 1000  Patient OOB in recliner with wheels locked and called light within reach. 1201  Assist patient back to bed. Call light within reach. 1420  No acute distress noted. 1506  Changed wound vac. Bedside and Verbal shift change report given to BEAU Fierro (oncoming nurse) by Doe Lentz (offgoing nurse). Report included the following information SBAR and Kardex.

## 2017-08-04 NOTE — PROGRESS NOTES
Problem: Mobility Impaired (Adult and Pediatric)  Goal: *Acute Goals and Plan of Care (Insert Text)  Physical Therapy Goals  Initiated 7/27/2017 and to be accomplished within 7 day(s)  Re-evaluated 8/3/17-all goals continued  1. Patient will move from supine to sit and sit to supine , scoot up and down and roll side to side in bed with supervision/set-up. 2. Patient will transfer from bed to chair and chair to bed with supervision/set-up using the least restrictive device. 3. Patient will perform sit to stand with supervision/set-up. 4. Patient will ambulate with supervision/set-up for >100 feet with the least restrictive device. 5. Patient will ascend/descend 4 stairs with 1 handrail(s) with supervision/set-up. PHYSICAL THERAPY TREATMENT     Patient: Susana Webster (63 y.o. male)  Date: 8/4/2017  Diagnosis: Severe sepsis with septic shock  Sepsis ([facility] [address]  DX <principal problem not specified>  Procedure(s) (LRB):  REMOVAL OF LEFT LEG INFECTED GRAFT (Left) 10 Days Post-Op  Precautions: Fall  Chart, physical therapy assessment, plan of care and goals were reviewed. ASSESSMENT:  Pt cont's to present as good ARU candidate as indicated by exceptional participation,receptiveness, ability to participate, and good support. Increased transfer proficiency noted as decreased assist is req'd for sit<>stand and stand step transfers,however, pt cont's to require cues for sequence and safety. L LE pain is worse than R per pt,however, with prolonged ambulation bilateral LE's begin to \"tighten\" in the calf. Improving L SLS tolerance noted as pt is beginning to progress to modified step through pattern with decreased antalgia noted.   Progression toward goals:  [X]      Improving appropriately and progressing toward goals  [X]      Improving slowly and progressing toward goals  [ ]      Not making progress toward goals and plan of care will be adjusted       PLAN:  Patient continues to benefit from skilled intervention to address the above impairments. Continue treatment per established plan of care. Discharge Recommendations:  Rehab  Further Equipment Recommendations for Discharge:  N/A          SUBJECTIVE:   Patient stated I was doing great until this breathing thing happened.       OBJECTIVE DATA SUMMARY:   Critical Behavior:  Neurologic State: Alert  Orientation Level: Oriented X4  Cognition: Follows commands, Appropriate decision making, Appropriate for age attention/concentration, Appropriate safety awareness  Safety/Judgement: Fall prevention  Functional Mobility Training:  Bed Mobility:  Rolling: Supervision  Supine to Sit: Stand-by asssistance  Sit to Supine: Stand-by asssistance  Scooting: Supervision  Transfers:  Sit to Stand: Contact guard assistance (x's 3 trials)  Stand to Sit: Contact guard assistance  Bed to Chair: Contact guard assistance  Other: stand step with RW  Balance:  Sitting: Intact; Without support  Standing: Impaired; With support  Standing - Static: Fair  Standing - Dynamic : Fair  Ambulation/Gait Training:  Distance (ft): 40 Feet (ft)  Assistive Device: Walker, rolling  Ambulation - Level of Assistance: Contact guard assistance;Stand-by asssistance;Assist x1  Gait Abnormalities: Antalgic;Decreased step clearance; Step to gait  Base of Support: Narrowed  Stance: Left decreased  Speed/Suzanna: Pace decreased (<100 feet/min)  Step Length: Left shortened;Right shortened  Interventions: Verbal cues (proximity to RW and erect posture)     Pain:  Pt reports 7/10 pain or discomfort prior to treatment. Pt reports 7/10 pain or discomfort post treatment. With ambulation,however, with rest pt reports 0- substantially reduced pain. Activity Tolerance:   Fair: pt is limited by respiratory status and primarily by bilateral lower leg pain with standing mobility. Please refer to the flowsheet for vital signs taken during this treatment.   After treatment:   [ ] Patient left in no apparent distress sitting up in chair  [X] Patient left in no apparent distress in bed  [X] Call bell left within reach  [ ] Nursing notified  [ ] Caregiver present  [ ] Bed alarm activated      Ofelia Vo PTA   Time Calculation: 25 mins

## 2017-08-04 NOTE — PROGRESS NOTES
Karrie Whatley the  from [address] called, states patient has been accepted into ARU, she just needs medical clearance  Karrie Whatley pager# [phone]

## 2017-08-04 NOTE — ROUTINE PROCESS
Bedside shift change report given to Diana Bowers (oncoming nurse) by Katherine Sanchez (offgoing nurse). Report included the following information SBAR, Kardex, Intake/Output and MAR.

## 2017-08-04 NOTE — ROUTINE PROCESS
Bedside and Verbal shift change report given to Jayshree Hoang RN (oncoming nurse) by Eun Sandoval RN (offgoing nurse). Report included the following information SBAR, Kardex, Intake/Output, MAR, Recent Results, Cardiac Rhythm NSR and Alarm Parameters .

## 2017-08-04 NOTE — PROGRESS NOTES
Problem: Self Care Deficits Care Plan (Adult)  Goal: *Acute Goals and Plan of Care (Insert Text)  Occupational Therapy Goals  Initiated 8/3/2017 within 7 day(s). 1. Patient will perform LE bathing with supervision  2. Patient will perform toilet transfers/toileting with supervision using rolling walker  3. Patient will perform lower body dressing with supervision   Outcome: Progressing Towards Goal  OCCUPATIONAL THERAPY TREATMENT     Patient: Mariella Chan (89 y.o. male)  Date: 8/4/2017  Diagnosis: Severe sepsis with septic shock  Sepsis ([facility] [address]  DX <principal problem not specified>  Procedure(s) (LRB):  REMOVAL OF LEFT LEG INFECTED GRAFT (Left) 10 Days Post-Op  Precautions: Fall  Chart, occupational therapy assessment, plan of care, and goals were reviewed. ASSESSMENT:  Pt is finishing up breakfast w/supportive family member present in room. Pt is very motivated to participate in OT this morning. Pt maneuvered to EOB in preparation for ADls w/CGA this morning. Pt performed functional txfr to the chair (simulating toilet txfr) w/FWW, and Min A 2/2 decreased balance and for wound vac tubing management. Upon positioning in the chair pt participated in LB dressing training, requiring Min A to simulate donning of underwear and Supervised A w/Min VCs for donning of socks w/AE (sock aid and reacher issued during one of the previous session). Pt left comfortable in the chair w/call bell within reach. Pt's O2 sats remained % on 10L O2 NC throughout the session. Patient would benefit from intensive acute rehab for strengthening in order to improve functional mobility and participation in self care tasks.    Progression toward goals:  [X]          Improving appropriately and progressing toward goals  [ ]          Improving slowly and progressing toward goals  [ ]          Not making progress toward goals and plan of care will be adjusted       PLAN:  Patient continues to benefit from skilled intervention to address the above impairments. Continue treatment per established plan of care. Discharge Recommendations:  Rehab  Further Equipment Recommendations for Discharge:  N/A      G-CODES:      Self Care  Current  CJ= 20-39%. The severity rating is based on the Level of Assistance required for Functional Mobility and ADLs. SUBJECTIVE:   Patient stated Of course I want to work with OT.      OBJECTIVE DATA SUMMARY:   Cognitive/Behavioral Status:  Neurologic State: Alert  Orientation Level: Oriented X4  Cognition: Follows commands, Appropriate decision making, Appropriate for age attention/concentration, Appropriate safety awareness  Safety/Judgement: Fall prevention     Functional Mobility and Transfers for ADLs:              Bed Mobility:  Rolling: Supervision  Supine to Sit: Contact guard assistance              Transfers:  Sit to Stand: Minimum assistance              Toilet Transfer : Minimum assistance (simulated w/FWW)               Balance:  Sitting: Intact  Standing: Impaired; With support  Standing - Static: Fair  Standing - Dynamic : Fair     ADL Intervention:   Lower Body Dressing Assistance  Underpants: Minimum assistance (simulated)  Socks: Supervision/set-up  Adaptive Equipment Used: Reacher;Sock aid        Pain:  Pt reports 0/10 pain or discomfort prior to treatment. Pt reports 0/10 pain or discomfort post treatment. Activity Tolerance:    Fair     Please refer to the flowsheet for vital signs taken during this treatment.   After treatment:   [ ]  Patient left in no apparent distress sitting up in chair  [X]  Patient left in no apparent distress in bed  [X]  Call bell left within reach  [X]  Nursing notified  [X]  Family present  [ ]  Bed alarm activated     LEA Collier  Time Calculation: 23 mins

## 2017-08-04 NOTE — PROGRESS NOTES
Infectious Disease progress Note    Requested by: Dr Marcus Renner    Reason: sepsis, mssa bacteremia    Current abx Prior abx   Nafcillin since 7/28-8/2  Cefazolin since 8/2 Pip/tazo, vancomycin 7/24-7/28     Lines:       Assessment :    Marcella Palmer is a 59 y.o. male with CAD, severe peripheral arterial insufficiency admitted to [facility] on 7/24/17 with gram positive bacteremia, exposed left groin vascular graft. Clinical picture c/w sepsis (POA) due to MSSA bloodstream infection (positive blood cultures 7/22, 7/24; negative blood cultures 7/28). Most likely source of MSSA bacteremia is infected left groin vascular graft. Patient appropriately underwent removal of infected graft, Repair of superficial femoral artery, left side; Repair of common femoral artery on 7/25/17. Intra op cx: MSSA. Transferred to ICU overnight for respiratory distress - likely volume overload from nafcillin - improved with diuresis    Recommendations:    1. cont cefazolin till 9/12/17  2. F/u vascular surgery recommendations  3. D/c planning per primary team    outpt iv abx orders written    Advance care plan: full code  Primary Decision Maker Name Rose Castro   Primary Decision Maker Phone Number        Above plan was discussed in details with patient, rn. Please call me if any further questions or concerns. Will continue to participate in the care of this patient. subjective:    Feels better. No sob. intermittent pain at the recent surgical site left groin. Patient denies headaches, visual disturbances, sore throat, runny nose, earaches, cp, sob, chills, cough, abdominal pain, diarrhea, burning micturition,  or weakness in extremities. He denies back pain/flank pain. Home Medication List    Details   amiodarone (CORDARONE) 200 mg tablet Take 1 Tab by mouth daily. Indications: VENTRICULAR RATE CONTROL IN ATRIAL FIBRILLATION  Qty: 60 Tab, Refills: 0      clopidogrel (PLAVIX) 75 mg tab Take 1 Tab by mouth daily.   Qty: 90 Tab, Refills: 3      oxyCODONE-acetaminophen (PERCOCET) 5-325 mg per tablet Take 2 Tabs by mouth every four (4) hours as needed. Max Daily Amount: 12 Tabs. Qty: 60 Tab, Refills: 0      gabapentin (NEURONTIN) 100 mg capsule Take 1 Cap by mouth two (2) times a day. Indications: NEUROPATHIC PAIN  Qty: 60 Cap, Refills: 9      aspirin 81 mg chewable tablet Take 1 Tab by mouth daily (with breakfast). Qty: 90 Tab, Refills: 3      cholecalciferol (VITAMIN D3) 1,000 unit tablet Take 1 Tab by mouth daily. Qty: 90 Tab, Refills: 3      losartan (COZAAR) 25 mg tablet Take 1 Tab by mouth daily. Indications: Chronic Heart Failure, hypertension  Qty: 90 Tab, Refills: 2      ascorbic acid, vitamin C, (VITAMIN C) 250 mg tablet Take 1 Tab by mouth daily (with breakfast). Qty: 90 Tab, Refills: 2      DULoxetine (CYMBALTA) 60 mg capsule Take 1 Cap by mouth daily. Qty: 90 Cap, Refills: 2      zinc sulfate (ZINCATE) 220 (50) mg capsule Take 1 Cap by mouth daily. Qty: 90 Cap, Refills: 0      ferrous sulfate 325 mg (65 mg iron) tablet Take 1 Tab by mouth daily (with breakfast). Qty: 90 Tab, Refills: 3      atorvastatin (LIPITOR) 40 mg tablet Take 1 Tab by mouth nightly. Indications: DYSLIPIDEMIA  Qty: 90 Tab, Refills: 3      metoprolol tartrate (LOPRESSOR) 25 mg tablet Take 0.5 Tabs by mouth every twelve (12) hours. Indications: hypertension, VENTRICULAR RATE CONTROL IN ATRIAL FIBRILLATION  Qty: 30 Tab, Refills: 6      dilTIAZem (CARDIZEM) 30 mg tablet Take 1 Tab by mouth Before breakfast, lunch, dinner and at bedtime. Indications: hypertension  Qty: 120 Tab, Refills: 6      cyanocobalamin (VITAMIN B-12) 1,000 mcg tablet Take 1 Tab by mouth daily.   Qty: 90 Tab, Refills: 3             Current Facility-Administered Medications   Medication Dose Route Frequency    lactobacillus sp. 50 billion cpu (BIO-K PLUS) capsule 1 Cap  1 Cap Oral DAILY    glucose chewable tablet 16 g  4 Tab Oral PRN    glucagon (GLUCAGEN) injection 1 mg  1 mg IntraMUSCular PRN    dextrose (D50W) injection syrg 12.5-25 g  25-50 mL IntraVENous PRN    furosemide (LASIX) tablet 20 mg  20 mg Oral DAILY    potassium chloride (KLOR-CON) packet 20 mEq  20 mEq Oral BID WITH MEALS    ceFAZolin (ANCEF) 2g IVPB in 50 mL D5W  2 g IntraVENous Q8H    WARFARIN INFORMATION NOTE (COUMADIN)   Other Q24H    gabapentin (NEURONTIN) capsule 300 mg  300 mg Oral TID    oxyCODONE-acetaminophen (PERCOCET 10)  mg per tablet 1-2 Tab  1-2 Tab Oral Q4H PRN    polyethylene glycol (MIRALAX) packet 17 g  17 g Oral DAILY    0.9% sodium chloride infusion 250 mL  250 mL IntraVENous PRN    sodium chloride (NS) flush 5-10 mL  5-10 mL IntraVENous Q8H    sodium chloride (NS) flush 5-10 mL  5-10 mL IntraVENous PRN    naloxone (NARCAN) injection 0.1 mg  0.1 mg IntraVENous PRN    atorvastatin (LIPITOR) tablet 40 mg  40 mg Oral QHS    acetaminophen (TYLENOL) tablet 650 mg  650 mg Oral Q4H PRN    HYDROmorphone (PF) (DILAUDID) injection 1 mg  1 mg IntraVENous Q3H PRN    diphenhydrAMINE (BENADRYL) injection 12.5 mg  12.5 mg IntraVENous Q4H PRN    ondansetron (ZOFRAN) injection 4 mg  4 mg IntraVENous Q4H PRN    docusate sodium (COLACE) capsule 100 mg  100 mg Oral DAILY    enoxaparin (LOVENOX) injection 40 mg  40 mg SubCUTAneous Q24H    amiodarone (CORDARONE) tablet 200 mg  200 mg Oral DAILY    ascorbic acid (vitamin C) (VITAMIN C) tablet 250 mg  250 mg Oral DAILY    aspirin chewable tablet 81 mg  81 mg Oral DAILY    cholecalciferol (VITAMIN D3) tablet 1,000 Units  1,000 Units Oral DAILY    cyanocobalamin tablet 1,000 mcg  1,000 mcg Oral DAILY    dilTIAZem (CARDIZEM) IR tablet 30 mg  30 mg Oral QID    DULoxetine (CYMBALTA) capsule 60 mg  60 mg Oral DAILY    ferrous sulfate tablet 325 mg  1 Tab Oral DAILY WITH BREAKFAST    metoprolol tartrate (LOPRESSOR) tablet 12.5 mg  12.5 mg Oral Q12H    losartan (COZAAR) tablet 25 mg  25 mg Oral DAILY       Allergies: Morphine    Temp (24hrs), Av.6 °F (36.4 °C), Min:97.4 °F (36.3 °C), Max:97.7 °F (36.5 °C)    Visit Vitals    /88    Pulse 62    Temp 97.7 °F (36.5 °C)    Resp 16    Ht 5' 10\" (1.778 m)    Wt 67.1 kg (147 lb 14.9 oz)    SpO2 100%    BMI 21.23 kg/m2       ROS: 12 point ROS obtained in details. Pertinent positives as mentioned in HPI,   otherwise negative    Physical Exam:    General: Well developed, well nourished male laying on the bed AAOx3 in no acute distress. General:   awake alert and oriented   HEENT:  Normocephalic, atraumatic, PERRL, EOMI, no scleral icterus or pallor; no conjunctival hemmohage;  nasal and oral mucous are moist and without evidence of lesions. No thrush. Neck supple, no bruits. Lymph Nodes:   no cervical, axillary or inguinal adenopathy   Lungs:   non-labored, bilaterally clear to auscultation- no crackles wheezes rales or rhonchi   Heart:  RRR, s1 and s2; no  rubs or gallops, no edema, + pedal pulses   Abdomen:  soft, non-distended, active bowel sounds, no hepatomegaly, no splenomegaly. Non-tender   Genitourinary:  deferred   Extremities:   no clubbing, cyanosis; no joint effusions or swelling; wound vac left groin; muscle mass appropriate for age, LLE cool to touch. Tenderness on palpation of shin LLE   Neurologic:  No gross focal sensory abnormalities; 5/5 muscle strength to upper and lower extremities. Speech appropriate.  Cranial nerves intact                        Skin:  Surgical changes left groin   Back:  no spinal or paraspinal muscle tenderness or rigidity, no CVA tenderness     Psychiatric:  No suicidal or homicidal ideations, appropriate mood and affect         Labs: Results:   Chemistry Recent Labs      17   0430  17   0340  17   0541   GLU  90  88  84   NA  132*  134*  135*   K  3.9  3.8  3.7   CL  96*  97*  100   CO2  26  27  27   BUN  8  9  8   CREA  0.75  0.75  0.76   CA  8.5  8.3*  8.3*   AGAP  10  10  8   BUCR  11*  12  11*   AP  649*  692*  696*   TP  7.1 6.9  6.6   ALB  2.3*  2.2*  2.0*   GLOB  4.8*  4.7*  4.6*   AGRAT  0.5*  0.5*  0.4*      CBC w/Diff Recent Labs      08/04/17   0430  08/03/17   0340  08/02/17   0541   WBC  11.8  12.2  15.4*   RBC  2.96*  3.01*  2.84*   HGB  8.5*  8.4*  8.1*   HCT  25.5*  25.5*  24.3*   PLT  522*  468*  474*   GRANS  71  77*  82*   LYMPH  19*  12*  11*   EOS  0  1  0      Microbiology Recent Labs      08/02/17   0544  08/02/17   0541   CULT  NO GROWTH 2 DAYS  NO GROWTH 2 DAYS          RADIOLOGY:    All available imaging studies/reports in [facility] for this admission were reviewed    Dr. Pamela Lmeos, Infectious Disease Specialist  [phone]  August 4, 2017  4:21 PM

## 2017-08-04 NOTE — ROUTINE PROCESS
Bedside shift change report given to Diana Bowers (oncoming nurse) by Pawan Ochoa (offgoing nurse). Report included the following information SBAR, Kardex, Intake/Output and MAR.

## 2017-08-04 NOTE — ROUTINE PROCESS
1849-TRANSFER - OUT REPORT:    Verbal report given to Ad Ryan RN(name) on Isai Buck  being transferred to [address] [place] [address] [address] for routine progression of care       Report consisted of patients Situation, Background, Assessment and   Recommendations(SBAR). Information from the following report(s) SBAR, Kardex, [facility] and Recent Results was reviewed with the receiving nurse. Lines:   PICC Double Lumen 31/84/72 Right;Basilic (Active)   Central Line Being Utilized Yes 8/4/2017  4:00 PM   Criteria for Appropriate Use Long term IV/antibiotic administration 8/4/2017  4:00 PM   Site Assessment Clean, dry, & intact 8/4/2017  4:00 PM   Phlebitis Assessment 0 8/4/2017  4:00 PM   Infiltration Assessment 0 8/4/2017  4:00 PM   Date of Last Dressing Change 08/02/17 8/4/2017  4:00 PM   Dressing Status Clean, dry, & intact 8/4/2017  4:00 PM   Dressing Type Transparent 8/4/2017  4:00 PM   Hub Color/Line Status Red 8/4/2017  4:00 PM   Positive Blood Return (Site #1) Yes 8/4/2017  4:00 PM   Hub Color/Line Status Purple 8/4/2017  4:00 PM   Positive Blood Return (Site #2) Yes 8/4/2017  4:00 PM   Alcohol Cap Used No 8/4/2017  4:00 PM        Opportunity for questions and clarification was provided.       Patient transported with:   O2 @ 2 liters

## 2017-08-05 LAB
ALBUMIN SERPL BCP-MCNC: 2.3 G/DL (ref 3.4–5)
ALBUMIN/GLOB SERPL: 0.5 {RATIO} (ref 0.8–1.7)
ALP SERPL-CCNC: 735 U/L (ref 45–117)
ALT SERPL-CCNC: 43 U/L (ref 16–61)
ANION GAP BLD CALC-SCNC: 6 MMOL/L (ref 3–18)
AST SERPL W P-5'-P-CCNC: 107 U/L (ref 15–37)
BASOPHILS # BLD AUTO: 0 K/UL (ref 0–0.1)
BASOPHILS # BLD: 0 % (ref 0–2)
BILIRUB SERPL-MCNC: 0.8 MG/DL (ref 0.2–1)
BUN SERPL-MCNC: 8 MG/DL (ref 7–18)
BUN/CREAT SERPL: 8 (ref 12–20)
CALCIUM SERPL-MCNC: 8.5 MG/DL (ref 8.5–10.1)
CHLORIDE SERPL-SCNC: 98 MMOL/L (ref 100–108)
CO2 SERPL-SCNC: 29 MMOL/L (ref 21–32)
CREAT SERPL-MCNC: 0.96 MG/DL (ref 0.6–1.3)
DIFFERENTIAL METHOD BLD: ABNORMAL
EOSINOPHIL # BLD: 0.1 K/UL (ref 0–0.4)
EOSINOPHIL NFR BLD: 1 % (ref 0–5)
ERYTHROCYTE [DISTWIDTH] IN BLOOD BY AUTOMATED COUNT: 19.6 % (ref 11.6–14.5)
GLOBULIN SER CALC-MCNC: 4.9 G/DL (ref 2–4)
GLUCOSE BLD STRIP.AUTO-MCNC: 105 MG/DL (ref 70–110)
GLUCOSE BLD STRIP.AUTO-MCNC: 107 MG/DL (ref 70–110)
GLUCOSE BLD STRIP.AUTO-MCNC: 120 MG/DL (ref 70–110)
GLUCOSE BLD STRIP.AUTO-MCNC: 120 MG/DL (ref 70–110)
GLUCOSE SERPL-MCNC: 104 MG/DL (ref 74–99)
HCT VFR BLD AUTO: 24.9 % (ref 36–48)
HGB BLD-MCNC: 8.3 G/DL (ref 13–16)
INR PPP: 1.3 (ref 0.8–1.2)
LYMPHOCYTES # BLD AUTO: 17 % (ref 21–52)
LYMPHOCYTES # BLD: 1.7 K/UL (ref 0.9–3.6)
MCH RBC QN AUTO: 29 PG (ref 24–34)
MCHC RBC AUTO-ENTMCNC: 33.3 G/DL (ref 31–37)
MCV RBC AUTO: 87.1 FL (ref 74–97)
MONOCYTES # BLD: 1 K/UL (ref 0.05–1.2)
MONOCYTES NFR BLD AUTO: 9 % (ref 3–10)
NEUTS SEG # BLD: 7.7 K/UL (ref 1.8–8)
NEUTS SEG NFR BLD AUTO: 73 % (ref 40–73)
PLATELET # BLD AUTO: 514 K/UL (ref 135–420)
PMV BLD AUTO: 9.3 FL (ref 9.2–11.8)
POTASSIUM SERPL-SCNC: 3.9 MMOL/L (ref 3.5–5.5)
PROT SERPL-MCNC: 7.2 G/DL (ref 6.4–8.2)
PROTHROMBIN TIME: 16 SEC (ref 11.5–15.2)
RBC # BLD AUTO: 2.86 M/UL (ref 4.7–5.5)
SODIUM SERPL-SCNC: 133 MMOL/L (ref 136–145)
WBC # BLD AUTO: 10.5 K/UL (ref 4.6–13.2)

## 2017-08-05 PROCEDURE — 80053 COMPREHEN METABOLIC PANEL: CPT | Performed by: SURGERY

## 2017-08-05 PROCEDURE — 74011250637 HC RX REV CODE- 250/637: Performed by: SURGERY

## 2017-08-05 PROCEDURE — 85610 PROTHROMBIN TIME: CPT | Performed by: SURGERY

## 2017-08-05 PROCEDURE — 65270000029 HC RM PRIVATE

## 2017-08-05 PROCEDURE — 97535 SELF CARE MNGMENT TRAINING: CPT

## 2017-08-05 PROCEDURE — 97116 GAIT TRAINING THERAPY: CPT

## 2017-08-05 PROCEDURE — 74011250637 HC RX REV CODE- 250/637: Performed by: PHYSICIAN ASSISTANT

## 2017-08-05 PROCEDURE — 77010033678 HC OXYGEN DAILY

## 2017-08-05 PROCEDURE — 82962 GLUCOSE BLOOD TEST: CPT

## 2017-08-05 PROCEDURE — 74011250636 HC RX REV CODE- 250/636: Performed by: PHYSICIAN ASSISTANT

## 2017-08-05 PROCEDURE — 85025 COMPLETE CBC W/AUTO DIFF WBC: CPT | Performed by: SURGERY

## 2017-08-05 PROCEDURE — 97530 THERAPEUTIC ACTIVITIES: CPT

## 2017-08-05 PROCEDURE — 74011250636 HC RX REV CODE- 250/636: Performed by: INTERNAL MEDICINE

## 2017-08-05 RX ORDER — WARFARIN SODIUM 5 MG/1
5 TABLET ORAL ONCE
Status: COMPLETED | OUTPATIENT
Start: 2017-08-05 | End: 2017-08-05

## 2017-08-05 RX ADMIN — VITAMIN D, TAB 1000IU (100/BT) 1000 UNITS: 25 TAB at 09:09

## 2017-08-05 RX ADMIN — POTASSIUM CHLORIDE 20 MEQ: 1.5 POWDER, FOR SOLUTION ORAL at 09:08

## 2017-08-05 RX ADMIN — Medication 10 ML: at 21:24

## 2017-08-05 RX ADMIN — GABAPENTIN 300 MG: 300 CAPSULE ORAL at 21:24

## 2017-08-05 RX ADMIN — CYANOCOBALAMIN TAB 1000 MCG 1000 MCG: 1000 TAB at 09:10

## 2017-08-05 RX ADMIN — OXYCODONE HYDROCHLORIDE AND ACETAMINOPHEN 2 TABLET: 10; 325 TABLET ORAL at 17:21

## 2017-08-05 RX ADMIN — Medication 250 MG: at 09:10

## 2017-08-05 RX ADMIN — GABAPENTIN 300 MG: 300 CAPSULE ORAL at 17:22

## 2017-08-05 RX ADMIN — DILTIAZEM HYDROCHLORIDE 30 MG: 30 TABLET, FILM COATED ORAL at 17:29

## 2017-08-05 RX ADMIN — METOPROLOL TARTRATE 12.5 MG: 25 TABLET ORAL at 21:24

## 2017-08-05 RX ADMIN — METOPROLOL TARTRATE 12.5 MG: 25 TABLET ORAL at 09:09

## 2017-08-05 RX ADMIN — LOSARTAN POTASSIUM 25 MG: 25 TABLET ORAL at 09:10

## 2017-08-05 RX ADMIN — WARFARIN SODIUM 5 MG: 5 TABLET ORAL at 18:39

## 2017-08-05 RX ADMIN — POLYETHYLENE GLYCOL 3350 17 G: 17 POWDER, FOR SOLUTION ORAL at 09:08

## 2017-08-05 RX ADMIN — CEFAZOLIN SODIUM 2 G: 2 SOLUTION INTRAVENOUS at 01:46

## 2017-08-05 RX ADMIN — Medication 10 ML: at 17:26

## 2017-08-05 RX ADMIN — AMIODARONE HYDROCHLORIDE 200 MG: 200 TABLET ORAL at 09:09

## 2017-08-05 RX ADMIN — DILTIAZEM HYDROCHLORIDE 30 MG: 30 TABLET, FILM COATED ORAL at 13:30

## 2017-08-05 RX ADMIN — DILTIAZEM HYDROCHLORIDE 30 MG: 30 TABLET, FILM COATED ORAL at 09:09

## 2017-08-05 RX ADMIN — DOCUSATE SODIUM 100 MG: 100 CAPSULE, LIQUID FILLED ORAL at 09:10

## 2017-08-05 RX ADMIN — OXYCODONE HYDROCHLORIDE AND ACETAMINOPHEN 2 TABLET: 10; 325 TABLET ORAL at 09:16

## 2017-08-05 RX ADMIN — ENOXAPARIN SODIUM 40 MG: 40 INJECTION SUBCUTANEOUS at 17:22

## 2017-08-05 RX ADMIN — CEFAZOLIN SODIUM 2 G: 2 SOLUTION INTRAVENOUS at 09:08

## 2017-08-05 RX ADMIN — DILTIAZEM HYDROCHLORIDE 30 MG: 30 TABLET, FILM COATED ORAL at 21:24

## 2017-08-05 RX ADMIN — Medication 10 ML: at 06:31

## 2017-08-05 RX ADMIN — CEFAZOLIN SODIUM 2 G: 2 SOLUTION INTRAVENOUS at 17:30

## 2017-08-05 RX ADMIN — OXYCODONE HYDROCHLORIDE AND ACETAMINOPHEN 2 TABLET: 10; 325 TABLET ORAL at 13:34

## 2017-08-05 RX ADMIN — ATORVASTATIN CALCIUM 40 MG: 40 TABLET, FILM COATED ORAL at 21:24

## 2017-08-05 RX ADMIN — POTASSIUM CHLORIDE 20 MEQ: 1.5 POWDER, FOR SOLUTION ORAL at 17:26

## 2017-08-05 RX ADMIN — FUROSEMIDE 20 MG: 20 TABLET ORAL at 09:10

## 2017-08-05 RX ADMIN — ASPIRIN 81 MG 81 MG: 81 TABLET ORAL at 09:09

## 2017-08-05 RX ADMIN — GABAPENTIN 300 MG: 300 CAPSULE ORAL at 09:08

## 2017-08-05 RX ADMIN — Medication 325 MG: at 09:09

## 2017-08-05 RX ADMIN — OXYCODONE HYDROCHLORIDE AND ACETAMINOPHEN 2 TABLET: 10; 325 TABLET ORAL at 03:22

## 2017-08-05 RX ADMIN — DULOXETINE HYDROCHLORIDE 60 MG: 60 CAPSULE, DELAYED RELEASE ORAL at 09:10

## 2017-08-05 RX ADMIN — OXYCODONE HYDROCHLORIDE AND ACETAMINOPHEN 2 TABLET: 10; 325 TABLET ORAL at 21:24

## 2017-08-05 NOTE — PROGRESS NOTES
Problem: Mobility Impaired (Adult and Pediatric)  Goal: *Acute Goals and Plan of Care (Insert Text)  Physical Therapy Goals  Initiated 7/27/2017 and to be accomplished within 7 day(s)  Re-evaluated 8/3/17-all goals continued  1. Patient will move from supine to sit and sit to supine , scoot up and down and roll side to side in bed with supervision/set-up. 2. Patient will transfer from bed to chair and chair to bed with supervision/set-up using the least restrictive device. 3. Patient will perform sit to stand with supervision/set-up. 4. Patient will ambulate with supervision/set-up for >100 feet with the least restrictive device. 5. Patient will ascend/descend 4 stairs with 1 handrail(s) with supervision/set-up. Outcome: Progressing Towards Goal  PHYSICAL THERAPY TREATMENT     Patient: Betty Ramirez (67 y.o. male)  Date: 8/5/2017  Diagnosis: Severe sepsis with septic shock  Sepsis ([facility] [address]  DX <principal problem not specified>  Procedure(s) (LRB):  REMOVAL OF LEFT LEG INFECTED GRAFT (Left) 11 Days Post-Op  Precautions: Fall   Chart, physical therapy assessment, plan of care and goals were reviewed. ASSESSMENT:  Pt found supine in bed willing to work with PT. Pt requires decreased assistance this tx. Unable to increase ambulation distance at this time due to pain. Pt ambulated with RW / wound vac attached. Pt continues to put little weight through left LE. Pt returned supine and left with needs in reach. Pt continues to be motivated to work with PT despite increased pain. Education: gait. Progression toward goals:  [ ]      Improving appropriately and progressing toward goals  [X]      Improving slowly and progressing toward goals  [ ]      Not making progress toward goals and plan of care will be adjusted       PLAN:  Patient continues to benefit from skilled intervention to address the above impairments. Continue treatment per established plan of care.   Discharge Recommendations:  Rehab / ARU  Further Equipment Recommendations for Discharge:  rolling walker       SUBJECTIVE:   Patient stated Yg Szymanski got up earlier to the chair, but had to come back to bed becausee my foot was getting numb.       OBJECTIVE DATA SUMMARY:   Critical Behavior:  Neurologic State: Alert  Orientation Level: Oriented X4  Cognition: Follows commands, Appropriate decision making, Appropriate for age attention/concentration, Appropriate safety awareness  Safety/Judgement: Fall prevention  Functional Mobility Training:  Bed Mobility:  Rolling: Supervision  Supine to Sit: Supervision  Sit to Supine: Supervision  Transfers:  Sit to Stand: Stand-by asssistance  Stand to Sit: Stand-by asssistance  Bed to Chair: Contact guard assistance  Balance:  Sitting: Intact  Standing: Impaired; With support  Standing - Static: Fair  Standing - Dynamic : Fair  Ambulation/Gait Training:  Distance (ft): 40 Feet (ft)  Assistive Device: Walker, rolling  Ambulation - Level of Assistance: Stand-by asssistance  Gait Abnormalities: Antalgic;Decreased step clearance  Base of Support: Narrowed  Stance: Left decreased  Speed/Suzanna: Pace decreased (<100 feet/min)  Step Length: Left shortened;Right shortened  Swing Pattern: Left asymmetrical;Right asymmetrical      Pain:  Pre tx pain: 6  Post tx pain: 6  Pain Scale 1: Numeric (0 - 10)  Pain Intensity 1: 6  Pain Location 1: Leg   Intervention: rest  Activity Tolerance:   Fair  Please refer to the flowsheet for vital signs taken during this treatment. After treatment:   [ ] Patient left in no apparent distress sitting up in chair  [X] Patient left in no apparent distress in bed  [X] Call bell left within reach  [ ] Nursing notified  [ ] Caregiver present  [ ] Bed alarm activated      Storm Corona PTA   Time Calculation: 16 mins     Mobility [id number] Current  CI= 1-19%. The severity rating is based on the Level of Assistance required for Functional Mobility and ADLs. Mobility   Goal  CI= 1-19%.   The severity rating is based on the Level of Assistance required for Functional Mobility and ADLs.

## 2017-08-05 NOTE — PROGRESS NOTES
Problem: Self Care Deficits Care Plan (Adult)  Goal: *Acute Goals and Plan of Care (Insert Text)  Occupational Therapy Goals  Initiated 8/3/2017 within 7 day(s). 1. Patient will perform LE bathing with supervision  2. Patient will perform toilet transfers/toileting with supervision using rolling walker  3. Patient will perform lower body dressing with supervision   Outcome: Progressing Towards Goal  OCCUPATIONAL THERAPY TREATMENT     Patient: Naima Gutierrez (1 y.o. male)  Date: 8/5/2017  Diagnosis: Severe sepsis with septic shock  Sepsis ([facility] [address]  DX <principal problem not specified>  Procedure(s) (LRB):  REMOVAL OF LEFT LEG INFECTED GRAFT (Left) 11 Days Post-Op  Precautions: Fall  Chart, occupational therapy assessment, plan of care, and goals were reviewed. ASSESSMENT:  Pt is very cooperative and motivated to participate in OT. Pt maneuvered to EOB w/Supervised A this session, and participated in LB dressing (donning socks w/AE), was able to perform w/Supervised A and 1 VC, pt performed LB bathing (simulated task) w/AE requiring initial VCs for technique. Pt demonstrates good carryover os the techniques for use of AE. Pt performed functional txfr to the chair w/FWW (simulating txfr to the toilet) w/CGA, and toileting hygiene (sunday-care) in std w/SBA. Pt c/o tightness in R calf, but no pain. Pt left comfortable in the reclined chair w/nurse present in room. Progression toward goals:  [X]          Improving appropriately and progressing toward goals  [ ]          Improving slowly and progressing toward goals  [ ]          Not making progress toward goals and plan of care will be adjusted       PLAN:  Patient continues to benefit from skilled intervention to address the above impairments. Continue treatment per established plan of care. Discharge Recommendations:  Rehab  Further Equipment Recommendations for Discharge:  N/A      G-CODES:      Self Care  Current  CJ= 20-39%.   The severity rating is based on the Level of Assistance required for Functional Mobility and ADLs. SUBJECTIVE:   Patient stated I was waiting for you this morning.       OBJECTIVE DATA SUMMARY:   Cognitive/Behavioral Status:  Neurologic State: Alert  Orientation Level: Oriented X4  Cognition: Follows commands, Appropriate decision making, Appropriate for age attention/concentration, Appropriate safety awareness  Safety/Judgement: Fall prevention     Functional Mobility and Transfers for ADLs:              Bed Mobility:  Rolling: Supervision  Supine to Sit: Supervision                    Transfers:  Sit to Stand: Stand-by asssistance     Bed to Chair: Contact guard assistance              Toilet Transfer : Contact guard assistance (simulated w/FWW)               Balance:  Sitting: Intact  Standing: Impaired  Standing - Static: Fair  Standing - Dynamic : Fair     ADL Intervention:   Lower Body Bathing  Lower Body : Supervision/set-up (simulated)  Adaptive Equipment: Reacher     Lower Body Dressing Assistance  Socks: Supervision/set-up  Adaptive Equipment Used: Sock aid  Pain:  Pt reports 0/10 pain or discomfort prior to treatment. Pt reports 0/10 pain or discomfort post treatment. Activity Tolerance:    Fair     Please refer to the flowsheet for vital signs taken during this treatment.   After treatment:   [X]  Patient left in no apparent distress sitting up in chair  [ ]  Patient left in no apparent distress in bed  [X]  Call bell left within reach  [X]  Nursing notified  [ ]  Caregiver present  [ ]  Bed alarm activated        LEA Alexander  Time Calculation: 26 mins

## 2017-08-05 NOTE — PROGRESS NOTES
2200-Pt woundvac with no output. Pt left shin is red tender to touch. Controlling pain with po percocet.

## 2017-08-05 NOTE — ROUTINE PROCESS
Bedside and Verbal shift change report given to GustavoRN (oncoming nurse) by Jc Acevedo RN (offgoing nurse). Report included the following information SBAR, Kardex, MAR and Recent Results. SITUATION:    Code Status: Full Code   Reason for Admission: Severe sepsis with septic shock   Sepsis ([address]  Ximena Newman 48 day: 15   Problem List:       Hospital Problems  Date Reviewed: 8/2/2017          Codes Class Noted POA    Sepsis [facility]) ICD-10-CM: A41.9  ICD-9-CM: 038.9, 995.91  7/24/2017 Unknown              BACKGROUND:    Past Medical History:   Past Medical History:   Diagnosis Date    Acute blood loss as cause of postoperative anemia 4/4/2017    Anticoagulated on Coumadin     Aortoiliac occlusive disease ([address] 1/25/2017    Atherosclerosis of native artery of both lower extremities with intermittent claudication ([address] 1/25/2017    Benign hypertensive heart disease with systolic congestive heart failure, NYHA class 2 ([address] 1/26/2015    Carotid artery disease (HCC)     Chronic atrial fibrillation (HCC)     Chronic systolic heart failure (HCC)     Chronic ulcer of right foot ([address] 4/4/2017    Coronary artery disease involving native coronary artery of native heart 3/15/2017    Successful stenting of Cx (Xience LESLEY) and RCA (Xience LESLEY) to 0% by Dr. Mariella Sharpe on 3/15/17.     DDD (degenerative disc disease), lumbar 1/26/2015    Dyslipidemia     Erectile dysfunction 7/5/2016    Euthyroid sick syndrome 4/6/2017    Hereditary peripheral neuropathy 11/15/2016    History of cardioversion 4/18/2017    S/P Synchronized external cardioversion (4/18/2017 - Dr. Javon Moreno)    Hypertension     Hyperuricemia     Ischemic cardiomyopathy     Peripheral artery disease ([address] 1/25/2017    Spinal stenosis of lumbar region with radiculopathy 5/4/2015    Dr. Charlie Vogt Vitamin D deficiency 4/22/2017    Vitamin D 25-Hydroxy (4/22/2017) = 12.0         Patient taking anticoagulants yes     ASSESSMENT:  Changes in Assessment Throughout Shift: no     Patient has Central Line: yes Reasons if yes: ABX   Patient has Jiang Cath: no Reasons if yes: .  Last Vitals:     Vitals:    08/05/17 0634 08/05/17 0854 08/05/17 1140 08/05/17 1532   BP:  (!) 165/91 (!) 168/93 (!) 166/91   Pulse:  68 64 65   Resp:  16 16 16   Temp:  97.4 °F (36.3 °C) 97 °F (36.1 °C) 97.2 °F (36.2 °C)   SpO2:  95% 95% 95%   Weight: 74.9 kg (165 lb 2 oz)      Height:            IV and DRAINS (will only show if present)   [REMOVED] Peripheral IV 07/24/17 Right Wrist-Site Assessment: Clean, dry, & intact  [REMOVED] Peripheral IV 07/31/17 Left Forearm-Site Assessment: Clean, dry, & intact  [REMOVED] Peripheral IV 07/25/17 Right Antecubital-Site Assessment: Clean, dry, & intact  Vacuum Assisted Closure Left Groin-Site Assessment: Clean, dry, & intact  [REMOVED] Peripheral IV 07/26/17 Right Hand-Site Assessment: Clean, dry, & intact  [REMOVED] Peripheral IV 08/02/17 Left Hand-Site Assessment: Dry, Intact  [REMOVED] Peripheral IV 08/02/17 Left Antecubital-Site Assessment: Clean, dry, & intact  PICC Double Lumen 95/79/22 Right;Basilic-Site Assessment: Clean, dry, & intact     WOUND (if present)   Wound Type:  Open surg wounds Rt. & Lt.  Groin   Dressing present Dressing Present : Yes, Intact, not due to be changed   Wound Concerns/Notes:  none     PAIN    Pain Assessment    Pain Intensity 1: 5 (08/05/17 1721)    Pain Location 1: Leg    Pain Intervention(s) 1: Medication (see MAR)    Patient Stated Pain Goal: 2  o Interventions for Pain:  See mar  o Intervention effective: yes  o Time of last intervention: see mar   o Reassessment Completed: yes      Last 3 Weights:  Last 3 Recorded Weights in this Encounter    08/03/17 0500 08/04/17 0500 08/05/17 0634   Weight: 55.2 kg (121 lb 11.1 oz) 67.1 kg (147 lb 14.9 oz) 74.9 kg (165 lb 2 oz)     Weight change: 7.8 kg (17 lb 3.1 oz)     INTAKE/OUPUT    Current Shift: 08/05 0701 - 08/05 1900  In: 343 [P.O.:460]  Out: 2050 [Urine:2050]    Last three shifts: 08/03 1901 - 08/05 0700  In: 1361 [P.O.:1080; I.V.:450]  Out: 4500 [Urine:4500]     LAB RESULTS     Recent Labs      08/05/17   0330  08/04/17   0430  08/03/17   0340   WBC  10.5  11.8  12.2   HGB  8.3*  8.5*  8.4*   HCT  24.9*  25.5*  25.5*   PLT  514*  522*  468*        Recent Labs      08/05/17   0330 08/04/17   0430 08/03/17   0340   NA  133*  132*  134*   K  3.9  3.9  3.8   GLU  104*  90  88   BUN  8  8  9   CREA  0.96  0.75  0.75   CA  8.5  8.5  8.3*   INR  1.3*  1.4*  1.3*       RECOMMENDATIONS AND DISCHARGE PLANNING     1. Pending tests/procedures/ Plan of Care or Other Needs: no     2. Discharge plan for patient and Needs/Barriers: Home    3. Estimated Discharge Date: . Posted on Whiteboard in [facility] Room: .      4. The patient's care plan was reviewed with the oncoming nurse. \"HEALS\" SAFETY CHECK      Fall Risk    Total Score: 3    Safety Measures: Safety Measures: Bed/Chair-Wheels locked, Bed in low position, Call light within reach    A safety check occurred in the patient's room between off going nurse and oncoming nurse listed above. The safety check included the below items  Area Items   H  High Alert Medications - Verify all high alert medication drips (heparin, PCA, etc.)   E  Equipment - Suction is set up for ALL patients (with yanker)  - Red plugs utilized for all equipment (IV pumps, etc.)  - WOWs wiped down at end of shift.  - Room stocked with oxygen, suction, and other unit-specific supplies   A  Alarms - Bed alarm is set for fall risk patients  - Ensure chair alarm is in place and activated if patient is up in a chair   L  Lines - Check IV for any infiltration  - Jiang bag is empty if patient has a Jiang   - Tubing and IV bags are labeled   S  Safety   - Room is clean, patient is clean, and equipment is clean. - Hallways are clear from equipment besides carts.    - Fall bracelet on for fall risk patients  - Ensure room is clear and free of clutter  - Suction is set up for ALL patients (with donnaker)  - Hallways are clear from equipment besides carts.    - Isolation precautions followed, supplies available outside room, sign posted     Lynnette Tucker RN

## 2017-08-05 NOTE — PROGRESS NOTES
Vascular Surgery Progress Note    Admit Date: 2017  POD 11 Days Post-Op    Procedure:  Procedure(s):  REMOVAL OF LEFT LEG INFECTED GRAFT      Subjective:     Patient has no new complaints. sitting up, walks with PT, tolerates diet. Family at bedside, looks well    Objective:     Blood pressure (!) 168/93, pulse 64, temperature 97 °F (36.1 °C), resp. rate 16, height 5' 10\" (1.778 m), weight 165 lb 2 oz (74.9 kg), SpO2 95 %. Temp (24hrs), Av.6 °F (36.4 °C), Min:97 °F (36.1 °C), Max:98.2 °F (36.8 °C)      Physical Exam:  LUNG:  clear to auscultation bilaterally, HEART:  S1, S2 normal, ABDOMEN:  no change, WOUND:  left leg  wound vac clean and EXTREMITIES:  cool but no acute ischemia    Labs: Results:       Chemistry Recent Labs      17   0340   GLU  104*  90  88   NA  133*  132*  134*   K  3.9  3.9  3.8   CL  98*  96*  97*   CO2  29  26  27   BUN  8  8  9   CREA  0.96  0.75  0.75   CA  8.5  8.5  8.3*   AGAP  6  10  10   BUCR  8*  11*  12   AP  735*  649*  692*   TP  7.2  7.1  6.9   ALB  2.3*  2.3*  2.2*   GLOB  4.9*  4.8*  4.7*   AGRAT  0.5*  0.5*  0.5*      CBC w/Diff Recent Labs      17   0340   WBC  10.5  11.8  12.2   RBC  2.86*  2.96*  3.01*   HGB  8.3*  8.5*  8.4*   HCT  24.9*  25.5*  25.5*   PLT  514*  522*  468*   GRANS  73  71  77*   LYMPH  17*  19*  12*   EOS  1  0  1      Microbiology No results for input(s): CULT in the last 72 hours.    Coagulation Recent Labs      17   043   PTP  16.0*  16.7*   INR  1.3*  1.4*         Data Review: images and reports reviewed    Assessment:     Active Problems:    Sepsis ([facility] [address] (2017)        Plan/Recommendations/Medical Decision Making:     Continue present treatment and hopefully rehab transfer Monday

## 2017-08-06 LAB
ALBUMIN SERPL BCP-MCNC: 2.3 G/DL (ref 3.4–5)
ALBUMIN/GLOB SERPL: 0.5 {RATIO} (ref 0.8–1.7)
ALP SERPL-CCNC: 684 U/L (ref 45–117)
ALT SERPL-CCNC: 39 U/L (ref 16–61)
ANION GAP BLD CALC-SCNC: 8 MMOL/L (ref 3–18)
AST SERPL W P-5'-P-CCNC: 98 U/L (ref 15–37)
BASOPHILS # BLD AUTO: 0 K/UL (ref 0–0.1)
BASOPHILS # BLD: 0 % (ref 0–2)
BILIRUB SERPL-MCNC: 0.7 MG/DL (ref 0.2–1)
BUN SERPL-MCNC: 7 MG/DL (ref 7–18)
BUN/CREAT SERPL: 10 (ref 12–20)
CALCIUM SERPL-MCNC: 8.6 MG/DL (ref 8.5–10.1)
CHLORIDE SERPL-SCNC: 97 MMOL/L (ref 100–108)
CO2 SERPL-SCNC: 29 MMOL/L (ref 21–32)
CREAT SERPL-MCNC: 0.72 MG/DL (ref 0.6–1.3)
DIFFERENTIAL METHOD BLD: ABNORMAL
EOSINOPHIL # BLD: 0.1 K/UL (ref 0–0.4)
EOSINOPHIL NFR BLD: 1 % (ref 0–5)
ERYTHROCYTE [DISTWIDTH] IN BLOOD BY AUTOMATED COUNT: 20 % (ref 11.6–14.5)
GLOBULIN SER CALC-MCNC: 5 G/DL (ref 2–4)
GLUCOSE BLD STRIP.AUTO-MCNC: 114 MG/DL (ref 70–110)
GLUCOSE BLD STRIP.AUTO-MCNC: 131 MG/DL (ref 70–110)
GLUCOSE BLD STRIP.AUTO-MCNC: 136 MG/DL (ref 70–110)
GLUCOSE SERPL-MCNC: 99 MG/DL (ref 74–99)
HCT VFR BLD AUTO: 25.4 % (ref 36–48)
HGB BLD-MCNC: 8.4 G/DL (ref 13–16)
INR PPP: 1.5 (ref 0.8–1.2)
LYMPHOCYTES # BLD AUTO: 14 % (ref 21–52)
LYMPHOCYTES # BLD: 1.7 K/UL (ref 0.9–3.6)
MCH RBC QN AUTO: 28.7 PG (ref 24–34)
MCHC RBC AUTO-ENTMCNC: 33.1 G/DL (ref 31–37)
MCV RBC AUTO: 86.7 FL (ref 74–97)
MONOCYTES # BLD: 1 K/UL (ref 0.05–1.2)
MONOCYTES NFR BLD AUTO: 8 % (ref 3–10)
NEUTS SEG # BLD: 9.1 K/UL (ref 1.8–8)
NEUTS SEG NFR BLD AUTO: 77 % (ref 40–73)
PLATELET # BLD AUTO: 548 K/UL (ref 135–420)
PMV BLD AUTO: 9.6 FL (ref 9.2–11.8)
POTASSIUM SERPL-SCNC: 3.8 MMOL/L (ref 3.5–5.5)
PROT SERPL-MCNC: 7.3 G/DL (ref 6.4–8.2)
PROTHROMBIN TIME: 17.6 SEC (ref 11.5–15.2)
RBC # BLD AUTO: 2.93 M/UL (ref 4.7–5.5)
SODIUM SERPL-SCNC: 134 MMOL/L (ref 136–145)
WBC # BLD AUTO: 11.9 K/UL (ref 4.6–13.2)

## 2017-08-06 PROCEDURE — 74011250637 HC RX REV CODE- 250/637: Performed by: PHYSICIAN ASSISTANT

## 2017-08-06 PROCEDURE — 65270000029 HC RM PRIVATE

## 2017-08-06 PROCEDURE — 85025 COMPLETE CBC W/AUTO DIFF WBC: CPT | Performed by: SURGERY

## 2017-08-06 PROCEDURE — 74011250636 HC RX REV CODE- 250/636: Performed by: PHYSICIAN ASSISTANT

## 2017-08-06 PROCEDURE — 80053 COMPREHEN METABOLIC PANEL: CPT | Performed by: SURGERY

## 2017-08-06 PROCEDURE — 36592 COLLECT BLOOD FROM PICC: CPT

## 2017-08-06 PROCEDURE — 82962 GLUCOSE BLOOD TEST: CPT

## 2017-08-06 PROCEDURE — 77030011256 HC DRSG MEPILEX <16IN NO BORD MOLN -A

## 2017-08-06 PROCEDURE — 74011250636 HC RX REV CODE- 250/636: Performed by: INTERNAL MEDICINE

## 2017-08-06 PROCEDURE — 74011250637 HC RX REV CODE- 250/637: Performed by: SURGERY

## 2017-08-06 PROCEDURE — 85610 PROTHROMBIN TIME: CPT | Performed by: SURGERY

## 2017-08-06 RX ORDER — WARFARIN 7.5 MG/1
7.5 TABLET ORAL ONCE
Status: COMPLETED | OUTPATIENT
Start: 2017-08-06 | End: 2017-08-06

## 2017-08-06 RX ADMIN — ASPIRIN 81 MG 81 MG: 81 TABLET ORAL at 09:20

## 2017-08-06 RX ADMIN — AMIODARONE HYDROCHLORIDE 200 MG: 200 TABLET ORAL at 09:21

## 2017-08-06 RX ADMIN — DILTIAZEM HYDROCHLORIDE 30 MG: 30 TABLET, FILM COATED ORAL at 21:02

## 2017-08-06 RX ADMIN — CEFAZOLIN SODIUM 2 G: 2 SOLUTION INTRAVENOUS at 01:39

## 2017-08-06 RX ADMIN — Medication 10 ML: at 05:40

## 2017-08-06 RX ADMIN — METOPROLOL TARTRATE 12.5 MG: 25 TABLET ORAL at 09:21

## 2017-08-06 RX ADMIN — ENOXAPARIN SODIUM 40 MG: 40 INJECTION SUBCUTANEOUS at 17:06

## 2017-08-06 RX ADMIN — DULOXETINE HYDROCHLORIDE 60 MG: 60 CAPSULE, DELAYED RELEASE ORAL at 09:21

## 2017-08-06 RX ADMIN — POLYETHYLENE GLYCOL 3350 17 G: 17 POWDER, FOR SOLUTION ORAL at 09:21

## 2017-08-06 RX ADMIN — ATORVASTATIN CALCIUM 40 MG: 40 TABLET, FILM COATED ORAL at 21:02

## 2017-08-06 RX ADMIN — METOPROLOL TARTRATE 12.5 MG: 25 TABLET ORAL at 21:02

## 2017-08-06 RX ADMIN — Medication 10 ML: at 14:49

## 2017-08-06 RX ADMIN — OXYCODONE HYDROCHLORIDE AND ACETAMINOPHEN 1 TABLET: 10; 325 TABLET ORAL at 13:02

## 2017-08-06 RX ADMIN — WARFARIN SODIUM 7.5 MG: 7.5 TABLET ORAL at 14:45

## 2017-08-06 RX ADMIN — GABAPENTIN 300 MG: 300 CAPSULE ORAL at 21:02

## 2017-08-06 RX ADMIN — GABAPENTIN 300 MG: 300 CAPSULE ORAL at 17:06

## 2017-08-06 RX ADMIN — CEFAZOLIN SODIUM 2 G: 2 SOLUTION INTRAVENOUS at 17:12

## 2017-08-06 RX ADMIN — DILTIAZEM HYDROCHLORIDE 30 MG: 30 TABLET, FILM COATED ORAL at 09:21

## 2017-08-06 RX ADMIN — Medication 250 MG: at 09:21

## 2017-08-06 RX ADMIN — FUROSEMIDE 20 MG: 20 TABLET ORAL at 09:21

## 2017-08-06 RX ADMIN — LOSARTAN POTASSIUM 25 MG: 25 TABLET ORAL at 09:20

## 2017-08-06 RX ADMIN — OXYCODONE HYDROCHLORIDE AND ACETAMINOPHEN 2 TABLET: 10; 325 TABLET ORAL at 01:31

## 2017-08-06 RX ADMIN — GABAPENTIN 300 MG: 300 CAPSULE ORAL at 09:21

## 2017-08-06 RX ADMIN — DILTIAZEM HYDROCHLORIDE 30 MG: 30 TABLET, FILM COATED ORAL at 17:11

## 2017-08-06 RX ADMIN — OXYCODONE HYDROCHLORIDE AND ACETAMINOPHEN 2 TABLET: 10; 325 TABLET ORAL at 17:12

## 2017-08-06 RX ADMIN — VITAMIN D, TAB 1000IU (100/BT) 1000 UNITS: 25 TAB at 09:20

## 2017-08-06 RX ADMIN — OXYCODONE HYDROCHLORIDE AND ACETAMINOPHEN 2 TABLET: 10; 325 TABLET ORAL at 05:34

## 2017-08-06 RX ADMIN — Medication 10 ML: at 21:03

## 2017-08-06 RX ADMIN — DOCUSATE SODIUM 100 MG: 100 CAPSULE, LIQUID FILLED ORAL at 09:21

## 2017-08-06 RX ADMIN — DILTIAZEM HYDROCHLORIDE 30 MG: 30 TABLET, FILM COATED ORAL at 14:45

## 2017-08-06 RX ADMIN — Medication 325 MG: at 09:20

## 2017-08-06 RX ADMIN — OXYCODONE HYDROCHLORIDE AND ACETAMINOPHEN 1 TABLET: 10; 325 TABLET ORAL at 09:37

## 2017-08-06 RX ADMIN — POTASSIUM CHLORIDE 20 MEQ: 1.5 POWDER, FOR SOLUTION ORAL at 09:21

## 2017-08-06 RX ADMIN — OXYCODONE HYDROCHLORIDE AND ACETAMINOPHEN 2 TABLET: 10; 325 TABLET ORAL at 21:02

## 2017-08-06 RX ADMIN — POTASSIUM CHLORIDE 20 MEQ: 1.5 POWDER, FOR SOLUTION ORAL at 17:06

## 2017-08-06 RX ADMIN — CEFAZOLIN SODIUM 2 G: 2 SOLUTION INTRAVENOUS at 09:29

## 2017-08-06 RX ADMIN — CYANOCOBALAMIN TAB 1000 MCG 1000 MCG: 1000 TAB at 09:21

## 2017-08-06 NOTE — ROUTINE PROCESS
Bedside and Verbal shift change report given to Libertad Jacobs RN (oncoming nurse) by Talya Johnson RN (offgoing nurse). Report included the following information SBAR, Kardex, MAR and Recent Results.

## 2017-08-06 NOTE — ROUTINE PROCESS
Bedside and Verbal shift change report given to Marni House RN (oncoming nurse) by Nhi Mendoza RN (offgoing nurse). Report included the following information SBAR, Kardex, MAR and Recent Results. SITUATION:    Code Status: Full Code   Reason for Admission: Severe sepsis with septic shock   Sepsis ([facility] [address]  Ximena Ford day: 15   Problem List:       Hospital Problems  Date Reviewed: 8/2/2017          Codes Class Noted POA    Sepsis [facility]) ICD-10-CM: A41.9  ICD-9-CM: 038.9, 995.91  7/24/2017 Unknown              BACKGROUND:    Past Medical History:   Past Medical History:   Diagnosis Date    Acute blood loss as cause of postoperative anemia 4/4/2017    Anticoagulated on Coumadin     Aortoiliac occlusive disease ([address] 1/25/2017    Atherosclerosis of native artery of both lower extremities with intermittent claudication ([address] 1/25/2017    Benign hypertensive heart disease with systolic congestive heart failure, NYHA class 2 ([address] 1/26/2015    Carotid artery disease (HCC)     Chronic atrial fibrillation (HCC)     Chronic systolic heart failure (HCC)     Chronic ulcer of right foot ([address] 4/4/2017    Coronary artery disease involving native coronary artery of native heart 3/15/2017    Successful stenting of Cx (Xience LESLEY) and RCA (Xience LESLEY) to 0% by Dr. Lalito Zavaleta on 3/15/17.     DDD (degenerative disc disease), lumbar 1/26/2015    Dyslipidemia     Erectile dysfunction 7/5/2016    Euthyroid sick syndrome 4/6/2017    Hereditary peripheral neuropathy 11/15/2016    History of cardioversion 4/18/2017    S/P Synchronized external cardioversion (4/18/2017 - Dr. Yanique Broussard)    Hypertension     Hyperuricemia     Ischemic cardiomyopathy     Peripheral artery disease ([facility] [address] 1/25/2017    Spinal stenosis of lumbar region with radiculopathy 5/4/2015    Dr. Amelia Renteria Vitamin D deficiency 4/22/2017    Vitamin D 25-Hydroxy (4/22/2017) = 12.0         Patient taking anticoagulants yes    ASSESSMENT:  Changes in Assessment Throughout Shift: none     Patient has Central Line: yes Reasons if yes: Abx   Patient has Jiang Cath: no Reasons if yes:       Last Vitals:     Vitals:    08/05/17 1532 08/05/17 1932 08/05/17 1955 08/06/17 0323   BP: (!) 166/91  (!) 166/93 (!) 161/93   Pulse: 65  64 61   Resp: 16  20 20   Temp: 97.2 °F (36.2 °C)  97.5 °F (36.4 °C) 97.7 °F (36.5 °C)   SpO2: 95% 98% 97% 97%   Weight:       Height:            IV and DRAINS (will only show if present)   [REMOVED] Peripheral IV 07/24/17 Right Wrist-Site Assessment: Clean, dry, & intact  [REMOVED] Peripheral IV 07/31/17 Left Forearm-Site Assessment: Clean, dry, & intact  [REMOVED] Peripheral IV 07/25/17 Right Antecubital-Site Assessment: Clean, dry, & intact  Vacuum Assisted Closure Left Groin-Site Assessment: Clean, dry, & intact  [REMOVED] Peripheral IV 07/26/17 Right Hand-Site Assessment: Clean, dry, & intact  [REMOVED] Peripheral IV 08/02/17 Left Hand-Site Assessment: Dry, Intact  [REMOVED] Peripheral IV 08/02/17 Left Antecubital-Site Assessment: Clean, dry, & intact  PICC Double Lumen 83/97/94 Right;Basilic-Site Assessment: Clean, Clean, dry, & intact     WOUND (if present)   Wound Type:  groin   Dressing present Dressing Present : Yes, Intact, not due to be changed   Wound Concerns/Notes:  none     PAIN    Pain Assessment    Pain Intensity 1: 0 (08/06/17 0323)    Pain Location 1: Leg    Pain Intervention(s) 1: Medication (see MAR)    Patient Stated Pain Goal: 2  o Interventions for Pain:  See mar  o Intervention effective: yes  o Time of last intervention: see mar   o Reassessment Completed: yes      Last 3 Weights:  Last 3 Recorded Weights in this Encounter    08/03/17 0500 08/04/17 0500 08/05/17 0634   Weight: 55.2 kg (121 lb 11.1 oz) 67.1 kg (147 lb 14.9 oz) 74.9 kg (165 lb 2 oz)     Weight change:      INTAKE/OUPUT    Current Shift: 08/05 1901 - 08/06 0700  In: 480 [P.O.:480]  Out: 800 [Urine:800]    Last three shifts: 08/04 0701 - 08/05 1900  In: 1990 [P.O.:1540; I.V.:450]  Out: 6150 [Urine:6150]     LAB RESULTS     Recent Labs      08/06/17 0130 08/05/17 0330 08/04/17 0430   WBC  11.9  10.5  11.8   HGB  8.4*  8.3*  8.5*   HCT  25.4*  24.9*  25.5*   PLT  548*  514*  522*        Recent Labs      08/06/17 0130 08/05/17 0330 08/04/17 0430   NA  134*  133*  132*   K  3.8  3.9  3.9   GLU  99  104*  90   BUN  7  8  8   CREA  0.72  0.96  0.75   CA  8.6  8.5  8.5   INR  1.5*  1.3*  1.4*       RECOMMENDATIONS AND DISCHARGE PLANNING     1. Pending tests/procedures/ Plan of Care or Other Needs: none     2. Discharge plan for patient and Needs/Barriers: rehab? 3. Estimated Discharge Date: unknown Posted on Whiteboard in Patients Room: no      4. The patient's care plan was reviewed with the oncoming nurse. \"HEALS\" SAFETY CHECK      Fall Risk    Total Score: 3    Safety Measures: Safety Measures: Bed/Chair-Wheels locked, Bed in low position, Call light within reach, Side rails X 3    A safety check occurred in the patient's room between off going nurse and oncoming nurse listed above. The safety check included the below items  Area Items   H  High Alert Medications - Verify all high alert medication drips (heparin, PCA, etc.)   E  Equipment - Suction is set up for ALL patients (with yanker)  - Red plugs utilized for all equipment (IV pumps, etc.)  - WOWs wiped down at end of shift.  - Room stocked with oxygen, suction, and other unit-specific supplies   A  Alarms - Bed alarm is set for fall risk patients  - Ensure chair alarm is in place and activated if patient is up in a chair   L  Lines - Check IV for any infiltration  - Jiang bag is empty if patient has a Jaing   - Tubing and IV bags are labeled   S  Safety   - Room is clean, patient is clean, and equipment is clean. - Hallways are clear from equipment besides carts.    - Fall bracelet on for fall risk patients  - Ensure room is clear and free of clutter  - Suction is set up for ALL patients (with cailin)  - Hallways are clear from equipment besides carts.    - Isolation precautions followed, supplies available outside room, sign posted     Emi Tanner RN

## 2017-08-06 NOTE — PROGRESS NOTES
Vascular Surgery Progress Note    Admit Date: 2017  POD 12 Days Post-Op    Procedure:  Procedure(s):  REMOVAL OF LEFT LEG INFECTED GRAFT      Subjective:     Patient has no new complaints. Resting comfortably. Pain controlled. Currently 5/10. Walks with PT, tolerates diet. Looks well. Objective:     Blood pressure 157/84, pulse 61, temperature 97.4 °F (36.3 °C), resp. rate 16, height 5' 10\" (1.778 m), weight 165 lb 2 oz (74.9 kg), SpO2 95 %. Temp (24hrs), Av.5 °F (36.4 °C), Min:97.2 °F (36.2 °C), Max:97.7 °F (36.5 °C)      Physical Exam:  GENERAL: alert, cooperative, no distress, appears stated age, LUNG:  no resp difficulty, ABDOMEN:  ND, mild bruising from Lovenox injections, right chest with mild purulent drainge from sores overlying bypass graft. , WOUND:  left leg  wound vac clean and EXTREMITIES:  cool but no acute ischemia    Labs: Results:       Chemistry Recent Labs      17   0430   GLU  99  104*  90   NA  134*  133*  132*   K  3.8  3.9  3.9   CL  97*  98*  96*   CO2  29  29  26   BUN  7  8  8   CREA  0.72  0.96  0.75   CA  8.6  8.5  8.5   AGAP  8  6  10   BUCR  10*  8*  11*   AP  684*  735*  649*   TP  7.3  7.2  7.1   ALB  2.3*  2.3*  2.3*   GLOB  5.0*  4.9*  4.8*   AGRAT  0.5*  0.5*  0.5*      CBC w/Diff Recent Labs      17   0430   WBC  11.9  10.5  11.8   RBC  2.93*  2.86*  2.96*   HGB  8.4*  8.3*  8.5*   HCT  25.4*  24.9*  25.5*   PLT  548*  514*  522*   GRANS  77*  73  71   LYMPH  14*  17*  19*   EOS  1  1  0      Microbiology No results for input(s): CULT in the last 72 hours.    Coagulation Recent Labs      17   0130  17   0330   PTP  17.6*  16.0*   INR  1.5*  1.3*         Data Review: images and reports reviewed    Assessment:     Active Problems:    Sepsis ([facility] [address] (2017)        Plan/Recommendations/Medical Decision Making:     Continue present treatment and hopefully rehab transfer Monday

## 2017-08-07 ENCOUNTER — HOSPITAL ENCOUNTER (INPATIENT)
Age: 57
LOS: 10 days | Discharge: ANOTHER HOSPITAL | DRG: 949 | End: 2017-08-17
Attending: INTERNAL MEDICINE | Admitting: INTERNAL MEDICINE
Payer: COMMERCIAL

## 2017-08-07 VITALS
HEIGHT: 70 IN | SYSTOLIC BLOOD PRESSURE: 171 MMHG | BODY MASS INDEX: 23.64 KG/M2 | WEIGHT: 165.12 LBS | TEMPERATURE: 98.1 F | DIASTOLIC BLOOD PRESSURE: 82 MMHG | RESPIRATION RATE: 19 BRPM | OXYGEN SATURATION: 94 % | HEART RATE: 68 BPM

## 2017-08-07 DIAGNOSIS — L97.419 CHRONIC ULCER OF RIGHT HEEL (HCC): Chronic | ICD-10-CM

## 2017-08-07 DIAGNOSIS — G60.9 HEREDITARY PERIPHERAL NEUROPATHY: Chronic | ICD-10-CM

## 2017-08-07 DIAGNOSIS — A41.9: Primary | ICD-10-CM

## 2017-08-07 DIAGNOSIS — I50.20 BENIGN HYPERTENSIVE HEART DISEASE WITH SYSTOLIC CONGESTIVE HEART FAILURE, NYHA CLASS 2 (HCC): Chronic | ICD-10-CM

## 2017-08-07 DIAGNOSIS — I11.0 BENIGN HYPERTENSIVE HEART DISEASE WITH SYSTOLIC CONGESTIVE HEART FAILURE, NYHA CLASS 2 (HCC): Chronic | ICD-10-CM

## 2017-08-07 DIAGNOSIS — T82.7XXD: Primary | ICD-10-CM

## 2017-08-07 DIAGNOSIS — T82.7XXD INFECTION OF VASCULAR BYPASS GRAFT, SUBSEQUENT ENCOUNTER: ICD-10-CM

## 2017-08-07 PROBLEM — T82.7XXA SEPSIS ASSOCIATED WITH INTERNAL VASCULAR ACCESS (HCC): Status: ACTIVE | Noted: 2017-08-07

## 2017-08-07 PROBLEM — Z78.9 IMPAIRED MOBILITY AND ADLS: Status: ACTIVE | Noted: 2017-07-24

## 2017-08-07 PROBLEM — Z74.09 IMPAIRED MOBILITY AND ADLS: Status: ACTIVE | Noted: 2017-07-24

## 2017-08-07 PROBLEM — Z86.39 HISTORY OF VITAMIN D DEFICIENCY: Status: ACTIVE | Noted: 2017-04-22

## 2017-08-07 PROBLEM — T82.7XXA SEPSIS ASSOCIATED WITH INTERNAL VASCULAR ACCESS (HCC): Status: ACTIVE | Noted: 2017-07-24

## 2017-08-07 PROBLEM — T82.7XXA INFECTION OF VASCULAR BYPASS GRAFT (HCC): Status: ACTIVE | Noted: 2017-07-24

## 2017-08-07 PROBLEM — D62 ACUTE BLOOD LOSS AS CAUSE OF POSTOPERATIVE ANEMIA: Status: ACTIVE | Noted: 2017-07-25

## 2017-08-07 PROBLEM — Z48.812 AFTERCARE FOLLOWING SURGERY OF THE CIRCULATORY SYSTEM: Status: ACTIVE | Noted: 2017-07-25

## 2017-08-07 LAB
ALBUMIN SERPL BCP-MCNC: 2.4 G/DL (ref 3.4–5)
ALBUMIN/GLOB SERPL: 0.5 {RATIO} (ref 0.8–1.7)
ALP SERPL-CCNC: 774 U/L (ref 45–117)
ALT SERPL-CCNC: 33 U/L (ref 16–61)
ANION GAP BLD CALC-SCNC: 9 MMOL/L (ref 3–18)
AST SERPL W P-5'-P-CCNC: 78 U/L (ref 15–37)
BASOPHILS # BLD AUTO: 0 K/UL (ref 0–0.06)
BASOPHILS # BLD: 0 % (ref 0–3)
BILIRUB SERPL-MCNC: 0.8 MG/DL (ref 0.2–1)
BUN SERPL-MCNC: 8 MG/DL (ref 7–18)
BUN/CREAT SERPL: 12 (ref 12–20)
CALCIUM SERPL-MCNC: 8.7 MG/DL (ref 8.5–10.1)
CHLORIDE SERPL-SCNC: 95 MMOL/L (ref 100–108)
CO2 SERPL-SCNC: 29 MMOL/L (ref 21–32)
CREAT SERPL-MCNC: 0.67 MG/DL (ref 0.6–1.3)
DIFFERENTIAL METHOD BLD: ABNORMAL
EOSINOPHIL # BLD: 0.1 K/UL (ref 0–0.4)
EOSINOPHIL NFR BLD: 1 % (ref 0–5)
ERYTHROCYTE [DISTWIDTH] IN BLOOD BY AUTOMATED COUNT: 20.6 % (ref 11.6–14.5)
GLOBULIN SER CALC-MCNC: 5.3 G/DL (ref 2–4)
GLUCOSE BLD STRIP.AUTO-MCNC: 105 MG/DL (ref 70–110)
GLUCOSE BLD STRIP.AUTO-MCNC: 106 MG/DL (ref 70–110)
GLUCOSE BLD STRIP.AUTO-MCNC: 113 MG/DL (ref 70–110)
GLUCOSE SERPL-MCNC: 90 MG/DL (ref 74–99)
HCT VFR BLD AUTO: 26.4 % (ref 36–48)
HGB BLD-MCNC: 8.6 G/DL (ref 13–16)
INR PPP: 1.8 (ref 0.8–1.2)
LYMPHOCYTES # BLD AUTO: 11 % (ref 20–51)
LYMPHOCYTES # BLD: 1.4 K/UL (ref 0.8–3.5)
MCH RBC QN AUTO: 28.9 PG (ref 24–34)
MCHC RBC AUTO-ENTMCNC: 32.6 G/DL (ref 31–37)
MCV RBC AUTO: 88.6 FL (ref 74–97)
MONOCYTES # BLD: 1.5 K/UL (ref 0–1)
MONOCYTES NFR BLD AUTO: 12 % (ref 2–9)
NEUTS SEG # BLD: 9.7 K/UL (ref 1.8–8)
NEUTS SEG NFR BLD AUTO: 76 % (ref 42–75)
PLATELET # BLD AUTO: 581 K/UL (ref 135–420)
PLATELET COMMENTS,PCOM: ABNORMAL
PMV BLD AUTO: 9.8 FL (ref 9.2–11.8)
POTASSIUM SERPL-SCNC: 3.9 MMOL/L (ref 3.5–5.5)
PROT SERPL-MCNC: 7.7 G/DL (ref 6.4–8.2)
PROTHROMBIN TIME: 20.5 SEC (ref 11.5–15.2)
RBC # BLD AUTO: 2.98 M/UL (ref 4.7–5.5)
RBC MORPH BLD: ABNORMAL
SODIUM SERPL-SCNC: 133 MMOL/L (ref 136–145)
WBC # BLD AUTO: 12.7 K/UL (ref 4.6–13.2)

## 2017-08-07 PROCEDURE — 80053 COMPREHEN METABOLIC PANEL: CPT | Performed by: SURGERY

## 2017-08-07 PROCEDURE — 74011250637 HC RX REV CODE- 250/637: Performed by: PHYSICIAN ASSISTANT

## 2017-08-07 PROCEDURE — 74011250636 HC RX REV CODE- 250/636: Performed by: INTERNAL MEDICINE

## 2017-08-07 PROCEDURE — 85025 COMPLETE CBC W/AUTO DIFF WBC: CPT | Performed by: SURGERY

## 2017-08-07 PROCEDURE — 97535 SELF CARE MNGMENT TRAINING: CPT

## 2017-08-07 PROCEDURE — 97530 THERAPEUTIC ACTIVITIES: CPT

## 2017-08-07 PROCEDURE — 97110 THERAPEUTIC EXERCISES: CPT

## 2017-08-07 PROCEDURE — 82962 GLUCOSE BLOOD TEST: CPT

## 2017-08-07 PROCEDURE — 77030019934 HC DRSG VAC ASST KCON -B

## 2017-08-07 PROCEDURE — 65310000000 HC RM PRIVATE REHAB

## 2017-08-07 PROCEDURE — 36592 COLLECT BLOOD FROM PICC: CPT

## 2017-08-07 PROCEDURE — 74011250637 HC RX REV CODE- 250/637: Performed by: INTERNAL MEDICINE

## 2017-08-07 PROCEDURE — 74011250637 HC RX REV CODE- 250/637: Performed by: SURGERY

## 2017-08-07 PROCEDURE — 85610 PROTHROMBIN TIME: CPT | Performed by: SURGERY

## 2017-08-07 RX ORDER — DILTIAZEM HYDROCHLORIDE 30 MG/1
30 TABLET, FILM COATED ORAL
Status: DISCONTINUED | OUTPATIENT
Start: 2017-08-07 | End: 2017-08-17 | Stop reason: HOSPADM

## 2017-08-07 RX ORDER — OXYCODONE AND ACETAMINOPHEN 10; 325 MG/1; MG/1
1-2 TABLET ORAL
Qty: 80 TAB | Refills: 0 | Status: SHIPPED | OUTPATIENT
Start: 2017-08-07 | End: 2017-08-25

## 2017-08-07 RX ORDER — POLYETHYLENE GLYCOL 3350 17 G/17G
17 POWDER, FOR SOLUTION ORAL DAILY
Qty: 30 PACKET | Refills: 0 | Status: ON HOLD
Start: 2017-08-07 | End: 2017-09-07 | Stop reason: CLARIF

## 2017-08-07 RX ORDER — ASCORBIC ACID 250 MG
250 TABLET ORAL
Status: DISCONTINUED | OUTPATIENT
Start: 2017-08-08 | End: 2017-08-17 | Stop reason: HOSPADM

## 2017-08-07 RX ORDER — DOCUSATE SODIUM 100 MG/1
100 CAPSULE, LIQUID FILLED ORAL 2 TIMES DAILY
Status: DISCONTINUED | OUTPATIENT
Start: 2017-08-07 | End: 2017-08-17 | Stop reason: HOSPADM

## 2017-08-07 RX ORDER — POLYETHYLENE GLYCOL 3350 17 G/17G
17 POWDER, FOR SOLUTION ORAL DAILY
Status: DISCONTINUED | OUTPATIENT
Start: 2017-08-07 | End: 2017-08-17 | Stop reason: HOSPADM

## 2017-08-07 RX ORDER — CEFAZOLIN SODIUM 2 G/50ML
2 SOLUTION INTRAVENOUS EVERY 8 HOURS
Qty: 50 ML | Refills: 0 | Status: SHIPPED
Start: 2017-08-07 | End: 2017-08-25

## 2017-08-07 RX ORDER — POTASSIUM CHLORIDE 1.5 G/1.77G
20 POWDER, FOR SOLUTION ORAL 2 TIMES DAILY WITH MEALS
Qty: 60 PACKET | Refills: 0 | Status: ON HOLD
Start: 2017-08-07 | End: 2017-08-24

## 2017-08-07 RX ORDER — BISACODYL 5 MG
10 TABLET, DELAYED RELEASE (ENTERIC COATED) ORAL
Status: DISCONTINUED | OUTPATIENT
Start: 2017-08-07 | End: 2017-08-17 | Stop reason: HOSPADM

## 2017-08-07 RX ORDER — POTASSIUM CHLORIDE 20 MEQ/1
20 TABLET, EXTENDED RELEASE ORAL 2 TIMES DAILY
Status: DISCONTINUED | OUTPATIENT
Start: 2017-08-07 | End: 2017-08-17 | Stop reason: HOSPADM

## 2017-08-07 RX ORDER — AMIODARONE HYDROCHLORIDE 200 MG/1
200 TABLET ORAL DAILY
Status: DISCONTINUED | OUTPATIENT
Start: 2017-08-08 | End: 2017-08-17 | Stop reason: HOSPADM

## 2017-08-07 RX ORDER — MELATONIN
1000 DAILY
Status: DISCONTINUED | OUTPATIENT
Start: 2017-08-08 | End: 2017-08-17 | Stop reason: HOSPADM

## 2017-08-07 RX ORDER — OXYCODONE HYDROCHLORIDE 5 MG/1
20 TABLET ORAL 3 TIMES DAILY
Status: DISCONTINUED | OUTPATIENT
Start: 2017-08-08 | End: 2017-08-14

## 2017-08-07 RX ORDER — DULOXETIN HYDROCHLORIDE 30 MG/1
60 CAPSULE, DELAYED RELEASE ORAL DAILY
Status: DISCONTINUED | OUTPATIENT
Start: 2017-08-08 | End: 2017-08-17 | Stop reason: HOSPADM

## 2017-08-07 RX ORDER — FUROSEMIDE 20 MG/1
20 TABLET ORAL DAILY
Status: DISCONTINUED | OUTPATIENT
Start: 2017-08-08 | End: 2017-08-17 | Stop reason: HOSPADM

## 2017-08-07 RX ORDER — GUAIFENESIN 100 MG/5ML
81 LIQUID (ML) ORAL
Status: DISCONTINUED | OUTPATIENT
Start: 2017-08-08 | End: 2017-08-17 | Stop reason: HOSPADM

## 2017-08-07 RX ORDER — WARFARIN SODIUM 5 MG/1
5 TABLET ORAL ONCE
Status: DISCONTINUED | OUTPATIENT
Start: 2017-08-07 | End: 2017-08-07 | Stop reason: HOSPADM

## 2017-08-07 RX ORDER — ENOXAPARIN SODIUM 100 MG/ML
40 INJECTION SUBCUTANEOUS EVERY 24 HOURS
Status: DISCONTINUED | OUTPATIENT
Start: 2017-08-08 | End: 2017-08-08

## 2017-08-07 RX ORDER — ZINC SULFATE 50(220)MG
1 CAPSULE ORAL DAILY
Status: DISCONTINUED | OUTPATIENT
Start: 2017-08-08 | End: 2017-08-17 | Stop reason: HOSPADM

## 2017-08-07 RX ORDER — LANOLIN ALCOHOL/MO/W.PET/CERES
1000 CREAM (GRAM) TOPICAL DAILY
Status: DISCONTINUED | OUTPATIENT
Start: 2017-08-08 | End: 2017-08-17 | Stop reason: HOSPADM

## 2017-08-07 RX ORDER — DOCUSATE SODIUM 100 MG/1
100 CAPSULE, LIQUID FILLED ORAL DAILY
Qty: 30 CAP | Refills: 0 | Status: ON HOLD
Start: 2017-08-07 | End: 2017-09-07 | Stop reason: CLARIF

## 2017-08-07 RX ORDER — WARFARIN SODIUM 5 MG/1
5 TABLET ORAL ONCE
Qty: 30 TAB | Refills: 0 | Status: SHIPPED
Start: 2017-08-07 | End: 2017-08-17

## 2017-08-07 RX ORDER — LOSARTAN POTASSIUM 50 MG/1
25 TABLET ORAL DAILY
Status: DISCONTINUED | OUTPATIENT
Start: 2017-08-08 | End: 2017-08-08

## 2017-08-07 RX ORDER — CEFAZOLIN SODIUM 2 G/50ML
2 SOLUTION INTRAVENOUS EVERY 8 HOURS
Status: DISCONTINUED | OUTPATIENT
Start: 2017-08-07 | End: 2017-08-17 | Stop reason: HOSPADM

## 2017-08-07 RX ORDER — METOPROLOL TARTRATE 25 MG/1
12.5 TABLET, FILM COATED ORAL EVERY 12 HOURS
Status: DISCONTINUED | OUTPATIENT
Start: 2017-08-07 | End: 2017-08-17 | Stop reason: HOSPADM

## 2017-08-07 RX ORDER — OXYCODONE HYDROCHLORIDE 5 MG/1
15-20 TABLET ORAL
Status: DISCONTINUED | OUTPATIENT
Start: 2017-08-07 | End: 2017-08-14

## 2017-08-07 RX ORDER — FUROSEMIDE 20 MG/1
TABLET ORAL
Qty: 30 TAB | Refills: 0 | Status: ON HOLD
Start: 2017-08-07 | End: 2017-09-08

## 2017-08-07 RX ORDER — GABAPENTIN 300 MG/1
300 CAPSULE ORAL EVERY 8 HOURS
Status: DISCONTINUED | OUTPATIENT
Start: 2017-08-07 | End: 2017-08-17 | Stop reason: HOSPADM

## 2017-08-07 RX ORDER — ATORVASTATIN CALCIUM 40 MG/1
40 TABLET, FILM COATED ORAL
Status: DISCONTINUED | OUTPATIENT
Start: 2017-08-07 | End: 2017-08-17 | Stop reason: HOSPADM

## 2017-08-07 RX ORDER — ACETAMINOPHEN 325 MG/1
650 TABLET ORAL
Status: DISCONTINUED | OUTPATIENT
Start: 2017-08-07 | End: 2017-08-17 | Stop reason: HOSPADM

## 2017-08-07 RX ORDER — WARFARIN 6 MG/1
6 TABLET ORAL ONCE
Status: COMPLETED | OUTPATIENT
Start: 2017-08-07 | End: 2017-08-07

## 2017-08-07 RX ORDER — LANOLIN ALCOHOL/MO/W.PET/CERES
1 CREAM (GRAM) TOPICAL
Status: DISCONTINUED | OUTPATIENT
Start: 2017-08-08 | End: 2017-08-08

## 2017-08-07 RX ADMIN — OXYCODONE HYDROCHLORIDE AND ACETAMINOPHEN 2 TABLET: 10; 325 TABLET ORAL at 10:08

## 2017-08-07 RX ADMIN — VITAMIN D, TAB 1000IU (100/BT) 1000 UNITS: 25 TAB at 10:09

## 2017-08-07 RX ADMIN — POLYETHYLENE GLYCOL 3350 17 G: 17 POWDER, FOR SOLUTION ORAL at 10:08

## 2017-08-07 RX ADMIN — WARFARIN SODIUM 6 MG: 6 TABLET ORAL at 18:18

## 2017-08-07 RX ADMIN — POTASSIUM CHLORIDE 20 MEQ: 1.5 POWDER, FOR SOLUTION ORAL at 10:08

## 2017-08-07 RX ADMIN — POLYETHYLENE GLYCOL 3350 17 G: 17 POWDER, FOR SOLUTION ORAL at 18:19

## 2017-08-07 RX ADMIN — DOCUSATE SODIUM 100 MG: 100 CAPSULE, LIQUID FILLED ORAL at 17:23

## 2017-08-07 RX ADMIN — POTASSIUM CHLORIDE 20 MEQ: 20 TABLET, EXTENDED RELEASE ORAL at 18:18

## 2017-08-07 RX ADMIN — Medication 250 MG: at 10:09

## 2017-08-07 RX ADMIN — ATORVASTATIN CALCIUM 40 MG: 40 TABLET, FILM COATED ORAL at 20:31

## 2017-08-07 RX ADMIN — LOSARTAN POTASSIUM 25 MG: 25 TABLET ORAL at 10:09

## 2017-08-07 RX ADMIN — ASPIRIN 81 MG 81 MG: 81 TABLET ORAL at 10:09

## 2017-08-07 RX ADMIN — METOPROLOL TARTRATE 12.5 MG: 25 TABLET ORAL at 20:31

## 2017-08-07 RX ADMIN — GABAPENTIN 300 MG: 300 CAPSULE ORAL at 22:11

## 2017-08-07 RX ADMIN — GABAPENTIN 300 MG: 300 CAPSULE ORAL at 18:18

## 2017-08-07 RX ADMIN — DILTIAZEM HYDROCHLORIDE 30 MG: 30 TABLET, FILM COATED ORAL at 10:08

## 2017-08-07 RX ADMIN — METOPROLOL TARTRATE 12.5 MG: 25 TABLET ORAL at 10:09

## 2017-08-07 RX ADMIN — DULOXETINE HYDROCHLORIDE 60 MG: 60 CAPSULE, DELAYED RELEASE ORAL at 10:10

## 2017-08-07 RX ADMIN — CEFAZOLIN SODIUM 2 G: 2 SOLUTION INTRAVENOUS at 10:08

## 2017-08-07 RX ADMIN — CEFAZOLIN SODIUM 2 G: 2 SOLUTION INTRAVENOUS at 01:23

## 2017-08-07 RX ADMIN — OXYCODONE HYDROCHLORIDE AND ACETAMINOPHEN 2 TABLET: 10; 325 TABLET ORAL at 01:15

## 2017-08-07 RX ADMIN — DILTIAZEM HYDROCHLORIDE 30 MG: 30 TABLET, FILM COATED ORAL at 21:44

## 2017-08-07 RX ADMIN — CYANOCOBALAMIN TAB 1000 MCG 1000 MCG: 1000 TAB at 10:09

## 2017-08-07 RX ADMIN — FUROSEMIDE 20 MG: 20 TABLET ORAL at 10:09

## 2017-08-07 RX ADMIN — GABAPENTIN 300 MG: 300 CAPSULE ORAL at 10:09

## 2017-08-07 RX ADMIN — DILTIAZEM HYDROCHLORIDE 30 MG: 30 TABLET, FILM COATED ORAL at 12:47

## 2017-08-07 RX ADMIN — Medication 325 MG: at 10:08

## 2017-08-07 RX ADMIN — Medication 10 ML: at 05:06

## 2017-08-07 RX ADMIN — OXYCODONE HYDROCHLORIDE AND ACETAMINOPHEN 2 TABLET: 10; 325 TABLET ORAL at 05:05

## 2017-08-07 RX ADMIN — OXYCODONE HYDROCHLORIDE 15 MG: 5 TABLET ORAL at 18:28

## 2017-08-07 RX ADMIN — CEFAZOLIN SODIUM 2 G: 2 SOLUTION INTRAVENOUS at 20:13

## 2017-08-07 RX ADMIN — AMIODARONE HYDROCHLORIDE 200 MG: 200 TABLET ORAL at 10:09

## 2017-08-07 NOTE — H&P
[facility] PHYSICAL REHABILITATION  [address] [place] [address], [address]     INPATIENT REHABILITATION  HISTORY AND PHYSICAL  (Post Admission Physician Evaluation)    Name: Naima Gutierrez CSN: [id number]   Age: 59 y.o. MRN: [id number]   Sex: male Admit Date: 8/7/2017     PCP: Dr. Yoshi Sanchez      Primary Rehab Impairment Category (MARTELL): Miscellaneous    Impairment Group Label: Infection    Etiologic Diagnosis: Sepsis associated with infection of vascular bypass graft      Subjective:     Patient seen and examined. History of the Present Illness: The patient is a 78-year-old White male with multiple medical comorbidities who was admitted to [facility] on 7/24/2017 due to severe sepsis. The patient was apparently well until ~2 days prior to admission, the patient woke upand was was unable to breath so he called 911 and he was brought to the [facility] Emergency Department. CT angiogram of the chest was negative for pulmonary embolism. 2D echocardiogram showed EF 50%, severe right heart dilatation with reduced right ventricular global systolic function. He was placed on pressors and diuresed with good improvement. His WBCs were noted to be elevated at 27.7K and he was started on intravenous antibiotics. Blood cultures were reportedly positive. He was noted to have an exposed graft in the left groin with purulent drainage. He denied any pain. He had improved clinically and had been weaned off pressors. He was transferred to [facility] for further treatment by his vascular surgeon. The patient was admitted under the service of Vascular Surgery (Dr. Brandt Jennings). WBC count was 13.9. Patient was continued on Piperacillin-Tazobactam and Vancomycin. Critical Care Medicine consult (Dr. Joseph Sage) was called for evaluation and comanagement. Cardiology consult (Dr. Carlee Mccoy) was called for evaluation and comanagement.  The patient was brought to the operating room and underwent Removal of infected graft; Repair of left superficial femoral artery; Repair of left common femoral artery on 7/25/2017 done by Dr. Cait Garcia. The patient tolerated the procedure well with no intraoperative complications. Blood culture drawn on 7/24/2017 yielded growth of MSSA. Surgical wound culture done 7/25 yielded growth of MSSA. Infectious Disease consult (Dr. Kristina Bernal) was called for evaluation and comanagement. Piperacillin-Tazobactam and Vancomycin were discontinued and the patient was started on Nafcillin IV continuous infusion on 7/28/2017. Blood culture drawn on 7/28/2017 yielded no growth after 6 days. On 8/2/2017, the Nafcillin IV continuous infusion was changed to Cefazolin 2 grams IV q 8 hr. Blood culture drawn on 8/2/2017 yielded no growth after 6 days. Infectious Disease recommended the Cefazolin to be continued until 9/12/2017. The patient had remained hemodynamically stable but due to the above events, the patient was noted to be generally weak and with impaired mobility and ADLs. Patient was felt to be a good candidate for acute inpatient rehabilitation. Upon evaluation by Physical Therapy and Occupational Therapy, the patient was recommended for acute inpatient rehabilitation. WBC count on 8/7/2017 prior to discharge was 12.7. The patient was discharged and was subsequently admitted to the [facility] for Physical Rehabilitation for intensive rehabilitation to help recover strength, function and mobility.       Past Medical History:  Past Medical History:   Diagnosis Date    Acute blood loss as cause of postoperative anemia 4/4/2017    Anticoagulated on Coumadin     Aortoiliac occlusive disease ([address] 1/25/2017    Atherosclerosis of native artery of both lower extremities with intermittent claudication ([address] 1/25/2017    Benign hypertensive heart disease with systolic congestive heart failure, NYHA class 2 ([address] 1/26/2015    Carotid artery disease (HCC)     Chronic anemia     Chronic atrial fibrillation (HCC)     Chronic systolic heart failure (HCC)     Chronic ulcer of right foot ([facility] [address] 4/4/2017    Coronary artery disease involving native coronary artery of native heart 3/15/2017    Successful stenting of Cx (Xience LESLEY) and RCA (Xience LESLEY) to 0% by Dr. Sol Smyth on 3/15/17.     DDD (degenerative disc disease), lumbar 1/26/2015    Dyslipidemia     Erectile dysfunction 7/5/2016    Euthyroid sick syndrome 4/6/2017    Hereditary peripheral neuropathy 11/15/2016    History of cardioversion 4/18/2017    S/P Synchronized external cardioversion (4/18/2017 - Dr. Martha Lara)    History of vitamin D deficiency 4/22/2017    Vitamin D 25-Hydroxy (4/22/2017) = 12.0; Vitamin D 25-Hydroxy (5/26/2017) = 38.7    Infection of vascular bypass graft ([facility] [address] 7/24/2017    Left lower extremity    Ischemic cardiomyopathy     Peripheral artery disease ([facility] [address] 1/25/2017    Spinal stenosis of lumbar region with radiculopathy 5/4/2015    Dr. Codie De Jesus       Past Surgical History:  Past Surgical History:   Procedure Laterality Date    CARDIAC CATHETERIZATION  3/8/2017         CARDIAC CATHETERIZATION  3/15/2017         CORONARY STENT SINGLE W/PTCA  3/15/2017         HX ATHERECTOMY Left 06/15/2017    S/P Atherectomy and balloon angioplasty of the left superficial femoral artery and above-knee popliteal artery (6/15/2017 - Dr. Nando Weaver)    HX COLONOSCOPY  07/23/2015    polyps repeat 2020 5 years Celine Henderson    [address] Right 04/04/2017    S/P Right axillary to bifemoral artery bypass using an 8 mm Propaten graft from the right axilla to the right common femoral artery with a jump femoral-femoral bypass with another 8 mm Propaten external ring bypass; Right common femoral artery, and profunda femoris artery and superficial femoral artery endarterectomy causing an extensive surgery on the right side (4/4/2017 - Dr. Latrice Lehman Miguel)    HX FEMORAL BYPASS Right 2017    S/P Cutdown of femoral-femoral bypass, more on the left groin side; Right lower extremity angiography with first-order catheterization (2017 - Dr. Nando Weaver)    HX FEMORAL BYPASS Right 04/10/2017    S/P Right femoral to above-knee popliteal bypass with an 8 mm PTFE graft (4/10/2017 - Dr. Mike Arreola)    HX OTHER SURGICAL Left 2017    S/P Removal of infected graft; Repair of left superficial femoral artery; Repair of left common femoral artery (2017 - Dr. Nando Weaver)    HX OTHER SURGICAL Right 2017    S/P Drainage of right side abscess (2017 - Dr. Nando Weaver)    HX OTHER SURGICAL Left 2017    S/P Left groin exploration; Left femoral repair pseudoaneurysm; Left wound washout; Wound VAC placement (2017 - Dr. Nando Weaver)    HX OTHER SURGICAL Left 2017    S/P Left common femoral artery ligation; Jump bypass from right to left fem-fem to a more distal superficial femoral artery using 8 mm external ringed Propaten graft; Left groin wound exploration and washout (2017 - Dr. Nando Weaver)       Allergies: Allergies   Allergen Reactions    Morphine Other (comments)     Patient gets confused with morphine. Social History: The patient is legally , lives alone in a 1-story trailer with a 3-step entry in [place], [place]. Patient is an ex-cigarette smoker who quit >10 years ago. He is an ex-alcoholic beverage drinker who quit ~3/2017. He last smoked marijuana ~10/2016. He presently works as a . He has a daughter who lives in [place], [place]. Family History: Both parents are . Family History   Problem Relation Age of Onset    Heart Disease Father        Transfer Medications (from the transfer summary prepared by JUNE Ross):    Prior to Admission Medications   Prescriptions Last Dose Informant Patient Reported? Taking? CEFAZOLIN SODIUM/DEXTROSE,ISO (CEFAZOLIN IN DEXTROSE, ISO-OS,) 2 gram/50 mL pgbk   No No   Si mL by IntraVENous route every eight (8) hours. Stop 17. DULoxetine (CYMBALTA) 60 mg capsule   No No   Sig: Take 1 Cap by mouth daily. amiodarone (CORDARONE) 200 mg tablet   No No   Sig: Take 1 Tab by mouth daily. Indications: VENTRICULAR RATE CONTROL IN ATRIAL FIBRILLATION   ascorbic acid, vitamin C, (VITAMIN C) 250 mg tablet   No No   Sig: Take 1 Tab by mouth daily (with breakfast). aspirin 81 mg chewable tablet   No No   Sig: Take 1 Tab by mouth daily (with breakfast). atorvastatin (LIPITOR) 40 mg tablet   No No   Sig: Take 1 Tab by mouth nightly. Indications: DYSLIPIDEMIA   cholecalciferol (VITAMIN D3) 1,000 unit tablet   No No   Sig: Take 1 Tab by mouth daily. cyanocobalamin (VITAMIN B-12) 1,000 mcg tablet   No No   Sig: Take 1 Tab by mouth daily. dilTIAZem (CARDIZEM) 30 mg tablet   No No   Sig: Take 1 Tab by mouth Before breakfast, lunch, dinner and at bedtime. Indications: hypertension   docusate sodium (COLACE) 100 mg capsule   No No   Sig: Take 1 Cap by mouth daily for 90 days. ferrous sulfate 325 mg (65 mg iron) tablet   No No   Sig: Take 1 Tab by mouth daily (with breakfast). furosemide (LASIX) 20 mg tablet   No No   Si tablet daily   gabapentin (NEURONTIN) 100 mg capsule   No No   Sig: Take 1 Cap by mouth two (2) times a day. Indications: NEUROPATHIC PAIN   losartan (COZAAR) 25 mg tablet   No No   Sig: Take 1 Tab by mouth daily. Indications: Chronic Heart Failure, hypertension   metoprolol tartrate (LOPRESSOR) 25 mg tablet   No No   Sig: Take 0.5 Tabs by mouth every twelve (12) hours. Indications: hypertension, VENTRICULAR RATE CONTROL IN ATRIAL FIBRILLATION   oxyCODONE-acetaminophen (PERCOCET 10)  mg per tablet   No No   Sig: Take 1-2 Tabs by mouth every four (4) hours as needed. Max Daily Amount: 12 Tabs.    polyethylene glycol (MIRALAX) 17 gram packet   No No   Sig: Take 1 Packet by mouth daily. potassium chloride (KLOR-CON) 20 mEq packet   No No   Sig: Take 1 Packet by mouth two (2) times daily (with meals). warfarin (COUMADIN) 5 mg tablet   No No   Sig: Take 1 Tab by mouth once for 1 dose. zinc sulfate (ZINCATE) 220 (50) mg capsule   No No   Sig: Take 1 Cap by mouth daily. Facility-Administered Medications: None       Review Of Systems:   CONSTITUTIONAL: No weight loss. EYES: No blurred vision and no eye discharge. ENT: No nasal discharge. No ear pain. CARDIOVASCULAR: No chest pain and no diaphoresis. RESPIRATORY: No cough, no hemoptysis. GI: No vomiting, no diarrhea   : No urinary frequency and no dysuria. MUSCULOSKELETAL: Significant for acute postoperative musculoskeletal pain. SKIN: No rashes. NEURO: No dizziness, no numbness. ENDOCRINE: No polyphagia and no polydipsia. HEMATOLOGY: Significant for acute postoperative anemia on top of chronic anemia. Objective:     Vital Signs:  Patient Vitals for the past 24 hrs:   BP Temp Pulse Resp SpO2 Weight   08/08/17 0838 155/71 99.1 °F (37.3 °C) 71 18 97 % -   08/08/17 0655 160/81 - 67 - - -   08/08/17 0553 159/86 - 68 - - -   08/07/17 2144 160/86 - 66 - - -   08/07/17 2031 155/86 - 63 - - -   08/07/17 2029 155/86 99 °F (37.2 °C) 63 18 95 % -   08/07/17 1719 - - - - - 75 kg (165 lb 5.5 oz)   08/07/17 1630 165/75 97.9 °F (36.6 °C) 62 18 95 % -        Body mass index is 23.72 kg/(m^2). Physical Examination:  GENERAL SURVEY: Patient is awake, alert, oriented x 3, sitting comfortably on the chair, not in acute respiratory distress.   HEENT: pale palpebral conjunctivae, anicteric sclerae, no nasoaural discharge, moist oral mucosa  NECK: supple, no jugular venous distention, no palpable lymph nodes  CHEST/LUNGS: symmetrical chest expansion, good air entry, clear breath sounds  HEART: adynamic precordium, good S1 S2, no S3, regular rhythm, no murmurs  ABDOMEN: flat, bowel sounds appreciated, soft, non-tender  EXTREMITIES: (+) WoundVac on left groin, (+) erythema on shin area of left leg, (+) ~1.5 cm x ~1.5 cm ulcer on right heel, (+) <1 cm x <1 cm wound above lateral malleolus of right ankle, pale nailbeds, no edema, full and equal pulses, no calf tenderness   NEUROLOGICAL EXAM: The patient is awake, alert and oriented x3, able to answer questions fairly appropriately, able to follow 1 and 2 step commands. Able to tell time from the wall clock. Cranial nerves II-XII are grossly intact. No gross sensory deficit. Motor strength is 4+/5 on BUE, 4+/5 on the RLE, 3+/5 on the left hip, 4-/5 on the left knee and left ankle.     Incision(s): healing well, clean, dry, and intact       Current Medications:  Current Facility-Administered Medications   Medication Dose Route Frequency    acetaminophen (TYLENOL) tablet 650 mg  650 mg Oral Q4H PRN    docusate sodium (COLACE) capsule 100 mg  100 mg Oral BID    bisacodyl (DULCOLAX) tablet 10 mg  10 mg Oral Q48H PRN    enoxaparin (LOVENOX) injection 40 mg  40 mg SubCUTAneous Q24H    amiodarone (CORDARONE) tablet 200 mg  200 mg Oral DAILY    ascorbic acid (vitamin C) (VITAMIN C) tablet 250 mg  250 mg Oral DAILY WITH BREAKFAST    aspirin chewable tablet 81 mg  81 mg Oral DAILY WITH BREAKFAST    ceFAZolin (ANCEF) 2g IVPB in 50 mL D5W  2 g IntraVENous Q8H    cholecalciferol (VITAMIN D3) tablet 1,000 Units  1,000 Units Oral DAILY    cyanocobalamin tablet 1,000 mcg  1,000 mcg Oral DAILY    dilTIAZem (CARDIZEM) IR tablet 30 mg  30 mg Oral AC&HS    DULoxetine (CYMBALTA) capsule 60 mg  60 mg Oral DAILY    ferrous sulfate tablet 325 mg  1 Tab Oral DAILY WITH BREAKFAST    furosemide (LASIX) tablet 20 mg  20 mg Oral DAILY    gabapentin (NEURONTIN) capsule 300 mg  300 mg Oral Q8H    lactobacillus sp. 50 billion cpu (BIO-K PLUS) capsule 1 Cap  1 Cap Oral DAILY    losartan (COZAAR) tablet 25 mg  25 mg Oral DAILY    metoprolol tartrate (LOPRESSOR) tablet 12.5 mg  12.5 mg Oral Q12H    oxyCODONE IR (ROXICODONE) tablet 20 mg  20 mg Oral TID    polyethylene glycol (MIRALAX) packet 17 g  17 g Oral DAILY    potassium chloride (K-DUR, KLOR-CON) SR tablet 20 mEq  20 mEq Oral BID    oxyCODONE IR (ROXICODONE) tablet 15-20 mg  15-20 mg Oral Q4H PRN    zinc sulfate (ZINCATE) capsule 220 mg  1 Cap Oral DAILY    atorvastatin (LIPITOR) tablet 40 mg  40 mg Oral QHS    WARFARIN INFORMATION NOTE (COUMADIN)   Other Q24H       Functional Assessment:     Occupational Therapy   Prior Level of Function  Pre-Admission Screen  Post-Admission Evaluation   Eating   Independent Eating   Independent Eating  Functional Level: 6   Grooming   Independent Grooming   Minimal Assist Grooming      Upper Body Dressing   Independent Upper Body Dressing   Modified Independent Upper Body Dressing  Functional Level: 5   Lower Body Dressing   Independent Lower Body Dressing   Supervision Lower Body Dressing  Functional Level: 3   Bladder Management   Independent Bladder Management   Minimal Assist Toileting  Functional Level: 2   Bowel Management   Independent Bowel Management   Minimal Assist      Physical Therapy   Prior Level of Function  Pre-Admission Screen  Post-Admission Evaluation   Ambulation   Modified Independent Ambulation   Supervision Gait  Amount of Assistance: 4 (Minimal assistance)  Distance (ft): 12 Feet (ft) (x 2 repetitions)  Assistive Device: Walker, rolling   Bed Mobility   Independent Bed Mobility   Supervision Bed/Mat Mobility  Rolling Right : 5 (Supervision)  Rolling Left : 5 (Supervision)  Supine to Sit : 5 (Supervision)  Sit to Supine : 5 (Supervision)   Supine to Sit   Independent Supine to Sit   Supervision Bed/Mat Mobility  Rolling Right : 5 (Supervision)  Rolling Left : 5 (Supervision)  Supine to Sit : 5 (Supervision)  Sit to Supine : 5 (Supervision)   Sit to Stand   Independent Sit to Stand   Supervision Bed/Mat Mobility  Rolling Right : 5 (Supervision)  Rolling Left : 5 (Supervision)  Supine to Sit : 5 (Supervision)  Sit to Supine : 5 (Supervision)   Bed/Chair Transfers   Independent Bed/Chair Transfers   Minimal Assist Transfers  Other: stand step with RW  Transfer Assistance : 3 (Moderate assistance ) (min-mod assist secondary to L LE pain)  Sit to Stand Assistance: Moderate assistance (min-mod assist secondary to L LE pain)  Car Transfers: Not tested  Car Type: N/A   Toilet Transfers   Independent Toilet Transfers   Minimal Assist Toilet Transfers  Toilet Transfer Score: 3     Speech and Language Pathology  Post-Admission Evaluation     Comprehension (Native Language)  Primary Mode of Comprehension: Auditory  Score: 6     Expression (Native Language)  Primary Mode of Expression: Verbal  Score: 6     Social Interaction/Pragmatics  Score: 6     Problem Solving  Score: 5     Memory  Score: 5       Legend:   7 - Independent   6 - Modified Independent   5 - Standby Assistance / Supervision / Set-up   4 - Minimum Assistance / Contact Guard Assistance   3 - Moderate Assistance   2 - Maximum Assistance   1 - Total Assistance / Dependent       Labs on Admission:  Recent Results (from the past 24 hour(s))   CBC WITH AUTOMATED DIFF    Collection Time: 08/08/17  6:36 AM   Result Value Ref Range    WBC 13.8 (H) 4.6 - 13.2 K/uL    RBC 3.06 (L) 4.70 - 5.50 M/uL    HGB 8.7 (L) 13.0 - 16.0 g/dL    HCT 27.0 (L) 36.0 - 48.0 %    MCV 88.2 74.0 - 97.0 FL    MCH 28.4 24.0 - 34.0 PG    MCHC 32.2 31.0 - 37.0 g/dL    RDW 20.8 (H) 11.6 - 14.5 %    PLATELET 958 (H) 654 - 420 K/uL    MPV 9.5 9.2 - 11.8 FL    NEUTROPHILS 81 (H) 40 - 73 %    LYMPHOCYTES 13 (L) 21 - 52 %    MONOCYTES 6 3 - 10 %    EOSINOPHILS 0 0 - 5 %    BASOPHILS 0 0 - 2 %    ABS. NEUTROPHILS 11.2 (H) 1.8 - 8.0 K/UL    ABS. LYMPHOCYTES 1.8 0.9 - 3.6 K/UL    ABS. MONOCYTES 0.8 0.05 - 1.2 K/UL    ABS. EOSINOPHILS 0.0 0.0 - 0.4 K/UL    ABS.  BASOPHILS 0.0 0.0 - 0.06 K/UL    DF AUTOMATED     FERRITIN    Collection Time: 08/08/17  6:36 AM Result Value Ref Range    Ferritin 1105 (H) 8 - 388 NG/ML   IRON PROFILE    Collection Time: 08/08/17  6:36 AM   Result Value Ref Range    Iron 28 (L) 50 - 175 ug/dL    TIBC 211 (L) 250 - 450 ug/dL    Iron % saturation 13 %   MAGNESIUM    Collection Time: 08/08/17  6:36 AM   Result Value Ref Range    Magnesium 1.6 1.6 - 2.6 mg/dL   METABOLIC PANEL, BASIC    Collection Time: 08/08/17  6:36 AM   Result Value Ref Range    Sodium 129 (L) 136 - 145 mmol/L    Potassium 4.4 3.5 - 5.5 mmol/L    Chloride 93 (L) 100 - 108 mmol/L    CO2 25 21 - 32 mmol/L    Anion gap 11 3.0 - 18 mmol/L    Glucose 90 74 - 99 mg/dL    BUN 7 7.0 - 18 MG/DL    Creatinine 0.65 0.6 - 1.3 MG/DL    BUN/Creatinine ratio 11 (L) 12 - 20      GFR est AA >60 >60 ml/min/1.73m2    GFR est non-AA >60 >60 ml/min/1.73m2    Calcium 8.8 8.5 - 10.1 MG/DL   PROTHROMBIN TIME + INR    Collection Time: 08/08/17  6:36 AM   Result Value Ref Range    Prothrombin time 25.4 (H) 11.5 - 15.2 sec    INR 2.5 (H) 0.8 - 1.2     RETICULOCYTE COUNT    Collection Time: 08/08/17  6:36 AM   Result Value Ref Range    Reticulocyte count 8.8 (H) 0.5 - 2.3 %   GLUCOSE, POC    Collection Time: 08/08/17  7:49 AM   Result Value Ref Range    Glucose (POC) 98 70 - 110 mg/dL   GLUCOSE, POC    Collection Time: 08/08/17 12:37 PM   Result Value Ref Range    Glucose (POC) 121 (H) 70 - 110 mg/dL       Estimated Glomerular Filtration Rate:  On admission, estimated GFR based on a Creatinine of 0.65 mg/dl:   Using CKD-EPI = 106.5 mL/min/1.73m2   Using MDRD = 133.6 mL/min/1.73m2      Assessment:     Primary Rehabilitation Diagnosis  1. Impaired Mobility and ADLs  2. S/P Removal of infected graft; Repair of left superficial femoral artery; Repair of left common femoral artery (7/25/2017 - Dr. Jovan Lama);  History of sepsis associated with infection of vascular bypass graft    Comorbidities  Patient Active Problem List   Diagnosis Code    Benign hypertensive heart disease with systolic congestive heart failure, NYHA class 2  I11.0, I50.20    DDD (degenerative disc disease), lumbar M51.36    Erectile dysfunction N52.9    Spinal stenosis of lumbar region with radiculopathy M48.06, M54.16    Hereditary peripheral neuropathy G60.9    Aortoiliac occlusive disease  I74.09    Atherosclerosis of native artery of both lower extremities with intermittent claudication  I70.213    Peripheral artery disease  I73.9    Coronary artery disease involving native coronary artery of native heart I25.10    Chronic atrial fibrillation  I48.2    Acute blood loss as cause of postoperative anemia D62    Impaired mobility and ADLs Z74.09    Anticoagulated on Coumadin Z51.81, Z79.01    Ischemic cardiomyopathy I25.5    Chronic systolic heart failure  J52.17    Carotid artery disease  I77.9    Hyperammonemia  E72.20    Dyslipidemia E78.5    Euthyroid sick syndrome E07.81    Aftercare following surgery of the circulatory system Z48.812    Status post femoral-popliteal bypass surgery Z95.828    History of cardioversion Z98.890    Critical ischemia of lower extremity I99.8    Chronic ulcer of right foot  L97.519    History of vitamin D deficiency Z86.39    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    Chronic anemia D64.9    Chronic ulcer of right heel  L97.419       Willingness to participate in the program: Good      Rehabilitation Potential: Good      Plan:     1. Medical Issues being followed closely:    [x]  Fall and safety precautions     [x]  Wound Care     [x]  Bowel and Bladder Function     [x]  Fluid Electrolyte and Nutrition Balance     [x]  Pain Control      2.  Issues that 24 hour rehabilitation nursing is following:    [x]  Fall and safety precautions     [x]  Wound Care     [x]  Bowel and Bladder Function     [x]  Fluid Electrolyte and Nutrition Balance     [x]  Pain Control      [x]  Assistance with and education on in-room safety with transfers to and from the bed, wheelchair, toilet and shower. 3. Acute rehabilitation plan of care:    [x]  Patient to be evaluated and treated by:           [x]  Physical Therapy           [x]  Occupational Therapy           []  Speech Therapy     []  Hold Rehab until further notice     5. Medications:    [x]  MAR Reviewed     [x]  Continue Present Medications     6. DVT Prophylaxis:      [x]  Lovenox     []  Unfractionated Heparin     [x]  Coumadin     []  NOAC     []  VASQUEZ Stockings     []  Sequential Compression Device     []  None     7. Rehabilitation program and expectations from patient, as well as medical issues discussed with the patient. REHABILITATION PLAN:    1. The patient is being admitted to a comprehensive acute inpatient rehabilitation program consisting of at least 180 minutes a day, 5 out of 7 days a week of  combined physical and occupational therapy, and close supervision by a physician with special training and experience in rehabilitation medicine. 2. The patient's prognosis for significant practical improvement within a reasonable period of time appears to be good. 3. The estimated length of stay is 13 days. 4. The patient/family has a good understanding of our discharge process. The patient has potential to make improvement and is in need of at least two of the following multidisciplinary therapies including but not limited to physical, occupational, speech, nutritional services, wound care, prosthetics and orthotics. The patient is expected to be able to return to home with home health therapy and family support. 5. Given the patient's multiple co-morbidities and risk for further medical complications, rehabilitation services could not be safely provided at a lower level of care such as a skilled nursing facility. 6. Physical therapy for therapeutic exercise, progressive mobility, gait training, transfer training, bed mobility training, patient and family education, and wheelchair mobility training.  Physical therapy goals to address - extremity function, range of motion, balance, safety awareness, independence in transfers, activity tolerance, independence in bed mobility, and independence in ambulation. 7. Occupational therapy for self-care home management, transfer training, therapeutic exercise, activity, wheelchair mobility training. Occupational therapy goals to address - extremity function, cognition, balance, activity tolerance, independence in functional transfers, range of motion, safety awareness, independence in ADL  and independence in home management skills. 8. Specialized 24 hour rehabilitation nursing care for bowel and bladder retraining, disease management, pain management, pressure ulcer prevention and management per policy, education on pressure relief techniques, embolism prevention, nutrition management, hydration management, transfer training and   medication distribution. 9. Nutrition and Dietary services will be obtained for assessment of adequate calorie needs, hydration and calorie counts as appropriate. 10. Therapeutic recreation for leisure skills. 6. Rehab psychology for coping skills. 12. Social work services for patient and family counseling and safe discharge planning. 13. I will be in charge of the inpatient rehab program. Full details of the inpatient rehab program will be outlined in the initial team conference. REHABILITATION GOALS:  Improve functional and activities of daily living skills in order to return back to independent living with family support. MEDICAL PLAN:  > S/P Removal of infected graft; Repair of left superficial femoral artery; Repair of left common femoral artery (7/25/2017 - Dr. 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   > Cefazolin 2 grams IVPB q 8 hr until 9/13/2017    > Acute Postoperative Blood Loss Anemia   > Hgb/Hct (8/8/2017, on admission) = 8.7/27.0   > Anemia work-up showed serum iron 28, TIBC 211, iron % saturation 13, ferritin 1105, reticulocyte count 8.8   > Discontinue FeSO4 325 mg PO once daily with breakfast    > Epoetin constance 10,000 units SC x 1 dose today    > Benign hypertensive heart disease with chronic systolic heart failure   > Diltiazem IR 30 mg PO TID AC and q HS   > Furosemide 20 mg PO once daily (6AM0   > Increase Losartan from 25 mg to 50 mg PO once daily (6AM)   > Metoprolol tartrate 12.5 mg PO q 12 hr (9AM, 9PM)    > Paroxysmal atrial fibrillation, presently normal sinus rhythm, anticoagulated on Coumadin   > Amiodarone 200 mg PO once daily   > Metoprolol tartrate 12.5 mg PO q 12 hr (9AM, 9PM)   > Discontinue Enoxaparin 40 mg SC q 24 hr   > Target INR = 2 to 3   > INR 2.5   > Patient received Coumadin 6 mg PO last night   > Coumadin 5 mg PO tonight    > Chronic ulcer of right heel    > Apply rigid heel suspension boots on both feet when supine in bed   > Podiatry consult (Dr. Wilfredo Marroquin) called for recommendations on care   > Collagenase ointment applied to right heel ulcer once daily, then cover with dry sterile dressing    > Analgesia   > Acetaminophen 650 mg PO q 4 hr PRN for pain level less than 5/10   > Naproxen 375 mg PO BID PC x 5 doses   > Oxycodone 20 mg PO TID (8AM, 12PM, 4PM)   > Oxycodone 15-20 mg PO q 4 hr PRN for pain level greater than 4/10 (from 8PM to 4AM only)     > Na (8/8/2017, on admission) = 129   > NaCl 1 gram PO BID    > Mg (8/8/2017, on admission) = 1.6   > Mg(OH)2 400 mg PO once daily      PRECAUTIONS:  1. Safety/fall precautions. 2. Deep venous thrombosis precautions. 3. Aspiration precautions. POTENTIAL BARRIERS TO DISCHARGE:   1. Risk for falls. 2. The patient lives alone. 3. Family limited in ability to provide constant care. RELEVANT CHANGES SINCE PREADMISSION SCREENING: I have compared the patients medical and functional status at the time of the pre-admission screening and on this post-admission evaluation.  The preadmission screen and findings from therapy evaluations both support my post admission physician evaluation, deeming this patient to be an appropriate candidate for the IRF. The patient requires multidisciplinary treatment, physician oversight and intensive therapy not provided at a lower level of care. By signing this document, I acknowledge that I have personally performed a full physical examination on this patient within 24 hours of admission to this inpatient rehabilitation facility and have determined the patient to be able to tolerate the above course of treatment at an intensive level for a reasonable period of time. I will be completing a detailed individualized plan of care for this patient by day #4 of the patients stay based upon the Pre-Admission Screen, the Post-Admission Evaluation, and the therapy evaluations.       Signed:    Brandy Varela MD    August 8, 2017

## 2017-08-07 NOTE — ROUTINE PROCESS
Bedside and Verbal shift change report given to Karel Trinidad RN (oncoming nurse) by Eliezer Terry RN (offgoing nurse). Report included the following information SBAR, Kardex, MAR and Recent Results. SITUATION:    Code Status: Full Code  Reason for Admission: Severe sepsis with septic shock  Sepsis ([facility]  Ximena Newman 48 day: 15   Problem List:       Hospital Problems  Date Reviewed: 8/2/2017          Codes Class Noted POA    Sepsis [facility]) ICD-10-CM: A41.9  ICD-9-CM: 038.9, 995.91  7/24/2017 Unknown              BACKGROUND:    Past Medical History:   Past Medical History:   Diagnosis Date    Acute blood loss as cause of postoperative anemia 4/4/2017    Anticoagulated on Coumadin     Aortoiliac occlusive disease ([facility] 1/25/2017    Atherosclerosis of native artery of both lower extremities with intermittent claudication ([address] 1/25/2017    Benign hypertensive heart disease with systolic congestive heart failure, NYHA class 2 ([address] 1/26/2015    Carotid artery disease (HCC)     Chronic atrial fibrillation (HCC)     Chronic systolic heart failure (HCC)     Chronic ulcer of right foot ([address] 4/4/2017    Coronary artery disease involving native coronary artery of native heart 3/15/2017    Successful stenting of Cx (Xience LESLEY) and RCA (Xience LESLEY) to 0% by Dr. Nirali Eckert on 3/15/17.     DDD (degenerative disc disease), lumbar 1/26/2015    Dyslipidemia     Erectile dysfunction 7/5/2016    Euthyroid sick syndrome 4/6/2017    Hereditary peripheral neuropathy 11/15/2016    History of cardioversion 4/18/2017    S/P Synchronized external cardioversion (4/18/2017 - Dr. Александр Moreno)    Hypertension     Hyperuricemia     Ischemic cardiomyopathy     Peripheral artery disease ([facility] 1/25/2017    Spinal stenosis of lumbar region with radiculopathy 5/4/2015    Dr. Rose Lancaster Vitamin D deficiency 4/22/2017    Vitamin D 25-Hydroxy (4/22/2017) = 12.0         Patient taking anticoagulants yes    ASSESSMENT:  Changes in Assessment Throughout Shift: none     Patient has Central Line: yes Reasons if yes: Abx   Patient has Jiang Cath: no Reasons if yes:       Last Vitals:     Vitals:    08/06/17 0845 08/06/17 1246 08/06/17 1719 08/06/17 2036   BP: 157/84 148/85 151/78 154/83   Pulse: 61 60 62 64   Resp: 16 16 16 17   Temp: 97.4 °F (36.3 °C) 96.3 °F (35.7 °C) 96.9 °F (36.1 °C) 97.2 °F (36.2 °C)   SpO2: 95% 94% 94% 94%   Weight:       Height:            IV and DRAINS (will only show if present)   [REMOVED] Peripheral IV 07/24/17 Right Wrist-Site Assessment: Clean, dry, & intact  [REMOVED] Peripheral IV 07/31/17 Left Forearm-Site Assessment: Clean, dry, & intact  [REMOVED] Peripheral IV 07/25/17 Right Antecubital-Site Assessment: Clean, dry, & intact  Vacuum Assisted Closure Left Groin-Site Assessment: Clean, Clean, dry, & intact  [REMOVED] Peripheral IV 07/26/17 Right Hand-Site Assessment: Clean, dry, & intact  [REMOVED] Peripheral IV 08/02/17 Left Hand-Site Assessment: Dry, Intact  [REMOVED] Peripheral IV 08/02/17 Left Antecubital-Site Assessment: Clean, dry, & intact  PICC Double Lumen 88/78/00 Right;Basilic-Site Assessment: Clean, dry, & intact     WOUND (if present)   Wound Type:  groin   Dressing present Dressing Present : Yes   Wound Concerns/Notes:  none     PAIN    Pain Assessment    Pain Intensity 1: 6 (08/07/17 0512)    Pain Location 1: Foot, Leg    Pain Intervention(s) 1: Medication (see MAR)    Patient Stated Pain Goal: 2  o Interventions for Pain:  See mar  o Intervention effective: yes  o Time of last intervention: see mar   o Reassessment Completed: yes      Last 3 Weights:  Last 3 Recorded Weights in this Encounter    08/03/17 0500 08/04/17 0500 08/05/17 0634   Weight: 55.2 kg (121 lb 11.1 oz) 67.1 kg (147 lb 14.9 oz) 74.9 kg (165 lb 2 oz)     Weight change:      INTAKE/OUPUT    Current Shift: 08/06 1901 - 08/07 0700  In: 480 [P.O.:480]  Out: 0     Last three shifts: 08/05 0701 - 08/06 1900  In: [address] [P.O.:1880]  Out: 5000 [Urine:5000]     LAB RESULTS     Recent Labs      08/07/17 0130 08/06/17 0130 08/05/17   0330   WBC  12.7  11.9  10.5   HGB  8.6*  8.4*  8.3*   HCT  26.4*  25.4*  24.9*   PLT  581*  548*  514*        Recent Labs      08/07/17 0130 08/06/17 0130 08/05/17   0330   NA  133*  134*  133*   K  3.9  3.8  3.9   GLU  90  99  104*   BUN  8  7  8   CREA  0.67  0.72  0.96   CA  8.7  8.6  8.5   INR  1.8*  1.5*  1.3*       RECOMMENDATIONS AND DISCHARGE PLANNING     1. Pending tests/procedures/ Plan of Care or Other Needs: none     2. Discharge plan for patient and Needs/Barriers: rehab? 3. Estimated Discharge Date: unknown Posted on Whiteboard in Patients Room: no      4. The patient's care plan was reviewed with the oncoming nurse. \"HEALS\" SAFETY CHECK      Fall Risk    Total Score: 3    Safety Measures: Safety Measures: Bed/Chair-Wheels locked, Bed in low position, Call light within reach, Side rails X 3    A safety check occurred in the patient's room between off going nurse and oncoming nurse listed above. The safety check included the below items  Area Items   H  High Alert Medications - Verify all high alert medication drips (heparin, PCA, etc.)   E  Equipment - Suction is set up for ALL patients (with donnaker)  - Red plugs utilized for all equipment (IV pumps, etc.)  - WOWs wiped down at end of shift.  - Room stocked with oxygen, suction, and other unit-specific supplies   A  Alarms - Bed alarm is set for fall risk patients  - Ensure chair alarm is in place and activated if patient is up in a chair   L  Lines - Check IV for any infiltration  - Jiang bag is empty if patient has a Jiang   - Tubing and IV bags are labeled   S  Safety   - Room is clean, patient is clean, and equipment is clean. - Hallways are clear from equipment besides carts.    - Fall bracelet on for fall risk patients  - Ensure room is clear and free of clutter  - Suction is set up for ALL patients (with cailin)  - Hallways are clear from equipment besides carts.    - Isolation precautions followed, supplies available outside room, sign posted     Johnie Rodriguez RN

## 2017-08-07 NOTE — ROUTINE PROCESS
SHIFT CHANGE NOTE FOR [facility]    Bedside and Verbal shift change report given to Paul Ford (oncoming nurse) by Oren Collado RN   (offgoing nurse). Report included the following information SBAR, Kardex, MAR and Recent Results. Situation:   Code Status: Full Code   Reason for Admission: sepsis  Hospital Day: 0   Problem List:   Hospital Problems  Date Reviewed: 8/2/2017          Codes Class Noted POA    Acute blood loss as cause of postoperative anemia ICD-10-CM: D62  ICD-9-CM: 285.1  7/25/2017 Yes        Aftercare following surgery of the circulatory system ICD-10-CM: Z48.812  ICD-9-CM: V58.73  7/25/2017 Yes    Overview Addendum 8/7/2017  2:20 PM by Rajani Sloan MD     S/P Removal of infected graft; Repair of left superficial femoral artery; Repair of left common femoral artery (7/25/2017 - Dr. Anu Mosley)             Impaired mobility and ADLs ICD-10-CM: Z74.09  ICD-9-CM: 799.89  7/24/2017 Yes        Infection of vascular bypass graft ([facility] ICD-10-CM: T82. 7XXA  ICD-9-CM: 996.62  7/24/2017 Yes    Overview Signed 8/7/2017  2:19 PM by Rajani Sloan MD     Left lower extremity             * (Principal)Sepsis associated with internal vascular access [facility]) ICD-10-CM: T82. 7XXA, A41.9  ICD-9-CM: 996.62, 038.9  7/24/2017 Yes        Anticoagulated on Coumadin (Chronic) ICD-10-CM: Z51.81, Z79.01  ICD-9-CM: V58.83, V58.61  Unknown Yes        Chronic atrial fibrillation (HCC) (Chronic) ICD-10-CM: I48.2  ICD-9-CM: 427.31  Unknown Yes        Benign hypertensive heart disease with systolic congestive heart failure, NYHA class 2 (HCC) (Chronic) ICD-10-CM: I11.0, I50.20  ICD-9-CM: 402.11, 428.20, 428.0  1/26/2015 Yes              Background:   Past Medical History:   Past Medical History:   Diagnosis Date    Acute blood loss as cause of postoperative anemia 4/4/2017    Anticoagulated on Coumadin     Aortoiliac occlusive disease ([facility] 1/25/2017    Atherosclerosis of native artery of both lower extremities with intermittent claudication ([facility] 1/25/2017    Benign hypertensive heart disease with systolic congestive heart failure, NYHA class 2 ([facility] 1/26/2015    Carotid artery disease (HCC)     Chronic anemia     Chronic atrial fibrillation (HCC)     Chronic systolic heart failure (HCC)     Chronic ulcer of right foot ([facility] 4/4/2017    Coronary artery disease involving native coronary artery of native heart 3/15/2017    Successful stenting of Cx (Xience LESLEY) and RCA (Xience LESLEY) to 0% by Dr. Polly Hector on 3/15/17.  DDD (degenerative disc disease), lumbar 1/26/2015    Dyslipidemia     Erectile dysfunction 7/5/2016    Euthyroid sick syndrome 4/6/2017    Hereditary peripheral neuropathy 11/15/2016    History of cardioversion 4/18/2017    S/P Synchronized external cardioversion (4/18/2017 - Dr. Charlotte Chwe)    History of vitamin D deficiency 4/22/2017    Vitamin D 25-Hydroxy (4/22/2017) = 12.0; Vitamin D 25-Hydroxy (5/26/2017) = 38.7    Infection of vascular bypass graft ([facility] 7/24/2017    Left lower extremity    Ischemic cardiomyopathy     Peripheral artery disease ([facility] 1/25/2017    Spinal stenosis of lumbar region with radiculopathy 5/4/2015    Dr. Brina Monroe      Patient taking anticoagulants yes   Patient has a defibrillator: no     Assessment:   Changes in Assessment throughout shift: no     Patient has central line: yes Reasons if yes: long term antibiotic  Insertion date:8/2/17 Last dressing date:8/2/17   Patient has Jiang Cath: no Reasons if yes:     Insertion date:     Last Vitals:     Vitals:    08/07/17 1630 08/07/17 1719   BP: 165/75    Pulse: 62    Resp: 18    Temp: 97.9 °F (36.6 °C)    SpO2: 95%    Weight:  75 kg (165 lb 5.5 oz)        PAIN    Pain Assessment    Pain Intensity 1: 9 (08/07/17 1828) Pain Intensity 1: 2 (12/29/14 1105)    Pain Location 1: Leg Pain Location 1: Abdomen    Pain Intervention(s) 1: Medication (see MAR) Pain Intervention(s) 1: Medication (see MAR)  Patient Stated Pain Goal: 0 Patient Stated Pain Goal: 0  o Intervention effective: no    o Other actions taken for pain: pain medicine     Skin Assessment  Skin color Skin Color: Appropriate for ethnicity  Condition/Temperature Skin Condition/Temp: Warm  Integrity Skin Integrity: Wound (add Wound LDA)  Turgor Turgor: Non-tenting  Weekly Pressure Ulcer Documentation  Pressure  Injury Documentation: No Pressure Injury Noted-Pressure Ulcer Prevention Initiated  Wound Prevention & Protection Methods  Orientation of wound Orientation of Wound Prevention: Posterior  Location of Prevention Location of Wound Prevention: Sacrum/Coccyx  Dressing Present Dressing Present : No  Dressing Status    Wound Offloading Wound Offloading (Prevention Methods): Bed, pressure redistribution/air, Chair cushion, Wheelchair     INTAKE/OUPUT    Date 08/06/17 1900 - 08/07/17 0659(Not Admitted) 08/07/17 0700 - 08/08/17 0659   Shift 6042-9181 24 Hour Total 6344-2773 1338-3150 24 Hour Total   I  N  T  A  K  E   P.O.   118  118      P.O.   118  118    Shift Total  (mL/kg)   118  (1.6)  118  (1.6)   O  U  T  P  U  T   Urine   900  (1)  900      Urine Voided   900  900    Shift Total  (mL/kg)   900  (12)  900  (12)   NET   -782  -782   Weight (kg)   75 75 75       Recommendations:  1. Patient needs and requests: pain medicine    2. Diet: Cardiac    3. Pending tests/procedures: no     4. Functional Level/Equipment: 1 x person assist    5. Estimated Discharge Date: TDB Posted on Whiteboard in Patients Room: no     [facility] Safety Check    A safety check occurred in the patient's room between off going nurse and oncoming nurse listed above.     The safety check included the below items  Area Items   H  High Alert Medications - Verify all high alert medication drips (heparin, PCA, etc.)   E  Equipment - Suction is set up for ALL patients (with yanker)  - Red plugs utilized for all equipment (IV pumps, etc.)  - WOWs wiped down at end of shift.  - Room stocked with oxygen, suction, and other unit-specific supplies   A  Alarms - Bed alarm is set for fall risk patients  - Ensure chair alarm is in place and activated if patient is up in a chair   L  Lines - Check IV for any infiltration  - Jiang bag is empty if patient has a Jiang   - Tubing and IV bags are labeled   S  Safety   - Room is clean, patient is clean, and equipment is clean. - Hallways are clear from equipment besides carts. - Fall bracelet on for fall risk patients  - Ensure room is clear and free of clutter  - Suction is set up for ALL patients (with cailin)  - Hallways are clear from equipment besides carts.    - Isolation precautions followed, supplies available outside room, sign posted

## 2017-08-07 NOTE — PROGRESS NOTES
ARU/IPR REFERRAL CONTACT NOTE  [id number] [place] [address] for Physical Rehabilitation          RE:  Mari Esperanza     Thank you for the opportunity to review this patient's case for admission to [address] [place] [address] for Physical Rehabilitation. Based on our pre-admission screening patient meets criteria for admission to [facility] for Physical Rehabilitation. Plan for admission today to room _178_____ at _3pm____. Will need d/c summary/med rec completed and report called to [phone] prior to patient's arrival.    Again, Thank you for this referral. Should you have any questions please do not hesitate to call. Sincerely,  Juancarlos Higgins.  [facility]  [phone]

## 2017-08-07 NOTE — DISCHARGE INSTRUCTIONS
DISCHARGE SUMMARY from Nurse    The following personal items are in your possession at time of discharge:    Dental Appliances: None  Visual Aid: None     Home Medications: Kept at bedside  Jewelry: None  Clothing: None  Other Valuables: Cell Phone  Personal Items Sent to Safe: no          PATIENT INSTRUCTIONS:    After general anesthesia or intravenous sedation, for 24 hours or while taking prescription Narcotics:  · Limit your activities  · Do not drive and operate hazardous machinery  · Do not make important personal or business decisions  · Do  not drink alcoholic beverages  · If you have not urinated within 8 hours after discharge, please contact your surgeon on call. Report the following to your surgeon:  · Excessive pain, swelling, redness or odor of or around the surgical area  · Temperature over 100.5  · Nausea and vomiting lasting longer than 4 hours or if unable to take medications  · Any signs of decreased circulation or nerve impairment to extremity: change in color, persistent  numbness, tingling, coldness or increase pain  · Any questions        What to do at Home:  Recommended activity: Activity as tolerated per Rehab instruction    If you experience any of the following symptoms severe pain, swelling, redness, temp >100.5 and chills, please follow up with Dr. Alesha Kyle. *  Please give a list of your current medications to your Primary Care Provider. *  Please update this list whenever your medications are discontinued, doses are      changed, or new medications (including over-the-counter products) are added. *  Please carry medication information at all times in case of emergency situations. These are general instructions for a healthy lifestyle:    No smoking/ No tobacco products/ Avoid exposure to second hand smoke    Surgeon General's Warning:  Quitting smoking now greatly reduces serious risk to your health.     Obesity, smoking, and sedentary lifestyle greatly increases your risk for illness    A healthy diet, regular physical exercise & weight monitoring are important for maintaining a healthy lifestyle    You may be retaining fluid if you have a history of heart failure or if you experience any of the following symptoms:  Weight gain of 3 pounds or more overnight or 5 pounds in a week, increased swelling in our hands or feet or shortness of breath while lying flat in bed. Please call your doctor as soon as you notice any of these symptoms; do not wait until your next office visit. Recognize signs and symptoms of STROKE:    F-face looks uneven    A-arms unable to move or move unevenly    S-speech slurred or non-existent    T-time-call 911 as soon as signs and symptoms begin-DO NOT go       Back to bed or wait to see if you get better-TIME IS BRAIN. Warning Signs of HEART ATTACK     Call 911 if you have these symptoms:   Chest discomfort. Most heart attacks involve discomfort in the center of the chest that lasts more than a few minutes, or that goes away and comes back. It can feel like uncomfortable pressure, squeezing, fullness, or pain.  Discomfort in other areas of the upper body. Symptoms can include pain or discomfort in one or both arms, the back, neck, jaw, or stomach.  Shortness of breath with or without chest discomfort.  Other signs may include breaking out in a cold sweat, nausea, or lightheadedness. Don't wait more than five minutes to call 911 - MINUTES MATTER! Fast action can save your life. Calling 911 is almost always the fastest way to get lifesaving treatment. Emergency Medical Services staff can begin treatment when they arrive -- up to an hour sooner than if someone gets to the hospital by car. The discharge information has been reviewed with the patient. The patient verbalized understanding. Discharge medications reviewed with the patient and appropriate educational materials and side effects teaching were provided.     Patient armband removed and shredded. MyChart Activation    Thank you for requesting access to [employer]. Please follow the instructions below to securely access and download your online medical record. [employer] allows you to send messages to your doctor, view your test results, renew your prescriptions, schedule appointments, and more. How Do I Sign Up? 1. In your internet browser, go to [email or web address]  2. Click on the First Time User? Click Here link in the Sign In box. You will be redirect to the New Member Sign Up page. 3. Enter your [employer] Access Code exactly as it appears below. You will not need to use this code after youve completed the sign-up process. If you do not sign up before the expiration date, you must request a new code. [employer] Access Code: Activation code not generated  Current [employer] Status: Patient Declined (This is the date your K[employer]gle access code will )    4. Enter the last four digits of your Social Security Number (xxxx) and Date of Birth (mm/dd/yyyy) as indicated and click Submit. You will be taken to the next sign-up page. 5. Create a [employer] ID. This will be your [employer] login ID and cannot be changed, so think of one that is secure and easy to remember. 6. Create a [employer] password. You can change your password at any time. 7. Enter your Password Reset Question and Answer. This can be used at a later time if you forget your password. 8. Enter your e-mail address. You will receive e-mail notification when new information is available in [address]. 9. Click Sign Up. You can now view and download portions of your medical record. 10. Click the Download Summary menu link to download a portable copy of your medical information. Additional Information    If you have questions, please visit the Frequently Asked Questions section of the [employer] website at [email or web address] Remember, [employer] is NOT to be used for urgent needs.  For medical emergencies, dial 911.

## 2017-08-07 NOTE — DISCHARGE SUMMARY
Physician Discharge Summary     Patient ID:  [facility]  [id number]  71 y.o.  1960    Admit date: 7/24/2017    Discharge date: 8/7/2017      Admitting Physician: Kerry Wu MD     Discharge Physician: Kerry Wu MD     Admission Diagnoses: Severe sepsis with septic shock  Sepsis [facility])  DX    Discharge Diagnoses: There are no discharge diagnoses documented for the most recent discharge. Procedures for this admission: Procedure(s):  REMOVAL OF LEFT LEG INFECTED GRAFT    Discharged Condition: stable    Hospital Course: 47-year-old gentleman admitted for septic shock with MSSA septicemia secondary to infected left groin graft. He therefore underwent removal of infected graft with repair of left superficial femoral artery and repair of common femoral artery. He tolerated procedure well. ID was consulted and he remains on IV antibiotics until 9/13 with Cefazolin. He did have an episode of acute respiratory distress during his admission requiring transfer to ICU. He is currently doing quite well and is stable for transfer to acute rehab. He is voiding, afebrile, ambulating and tolerating PO. Discharge Exam: General: Well-appearing male in no acute distress   HEENT: EOMI, no scleral icterus is noted. Pulmonary: No increased work or breathing is noted. Abdomen:  nondistended. Extremities: left leg  wound vac clean and in place. Extremities cool but no acute ischemia  Neuro: Cranial nerves II through XII are grossly intact   Integument: No ulcerations are identified visibly      Disposition: acute rehab    Patient Instructions:   Current Discharge Medication List      START taking these medications    Details   docusate sodium (COLACE) 100 mg capsule Take 1 Cap by mouth daily for 90 days. Qty: 30 Cap, Refills: 0      furosemide (LASIX) 20 mg tablet 1 tablet daily  Qty: 30 Tab, Refills: 0      warfarin (COUMADIN) 5 mg tablet Take 1 Tab by mouth once for 1 dose.   Qty: 30 Tab, Refills: 0 oxyCODONE-acetaminophen (PERCOCET 10)  mg per tablet Take 1-2 Tabs by mouth every four (4) hours as needed. Max Daily Amount: 12 Tabs. Qty: 80 Tab, Refills: 0      polyethylene glycol (MIRALAX) 17 gram packet Take 1 Packet by mouth daily. Qty: 30 Packet, Refills: 0      potassium chloride (KLOR-CON) 20 mEq packet Take 1 Packet by mouth two (2) times daily (with meals). Qty: 60 Packet, Refills: 0      CEFAZOLIN SODIUM/DEXTROSE,ISO (CEFAZOLIN IN DEXTROSE, ISO-OS,) 2 gram/50 mL pgbk 50 mL by IntraVENous route every eight (8) hours. Stop 9/13/17. Qty: 50 mL, Refills: 0         CONTINUE these medications which have NOT CHANGED    Details   amiodarone (CORDARONE) 200 mg tablet Take 1 Tab by mouth daily. Indications: VENTRICULAR RATE CONTROL IN ATRIAL FIBRILLATION  Qty: 60 Tab, Refills: 0      gabapentin (NEURONTIN) 100 mg capsule Take 1 Cap by mouth two (2) times a day. Indications: NEUROPATHIC PAIN  Qty: 60 Cap, Refills: 9      aspirin 81 mg chewable tablet Take 1 Tab by mouth daily (with breakfast). Qty: 90 Tab, Refills: 3      cholecalciferol (VITAMIN D3) 1,000 unit tablet Take 1 Tab by mouth daily. Qty: 90 Tab, Refills: 3      losartan (COZAAR) 25 mg tablet Take 1 Tab by mouth daily. Indications: Chronic Heart Failure, hypertension  Qty: 90 Tab, Refills: 2      ascorbic acid, vitamin C, (VITAMIN C) 250 mg tablet Take 1 Tab by mouth daily (with breakfast). Qty: 90 Tab, Refills: 2      DULoxetine (CYMBALTA) 60 mg capsule Take 1 Cap by mouth daily. Qty: 90 Cap, Refills: 2      zinc sulfate (ZINCATE) 220 (50) mg capsule Take 1 Cap by mouth daily. Qty: 90 Cap, Refills: 0      ferrous sulfate 325 mg (65 mg iron) tablet Take 1 Tab by mouth daily (with breakfast). Qty: 90 Tab, Refills: 3      atorvastatin (LIPITOR) 40 mg tablet Take 1 Tab by mouth nightly. Indications: DYSLIPIDEMIA  Qty: 90 Tab, Refills: 3      metoprolol tartrate (LOPRESSOR) 25 mg tablet Take 0.5 Tabs by mouth every twelve (12) hours. Indications: hypertension, VENTRICULAR RATE CONTROL IN ATRIAL FIBRILLATION  Qty: 30 Tab, Refills: 6      dilTIAZem (CARDIZEM) 30 mg tablet Take 1 Tab by mouth Before breakfast, lunch, dinner and at bedtime. Indications: hypertension  Qty: 120 Tab, Refills: 6      cyanocobalamin (VITAMIN B-12) 1,000 mcg tablet Take 1 Tab by mouth daily. Qty: 90 Tab, Refills: 3         STOP taking these medications       clopidogrel (PLAVIX) 75 mg tab Comments:   Reason for Stopping:         oxyCODONE-acetaminophen (PERCOCET) 5-325 mg per tablet Comments:   Reason for Stopping:               Reference discharge instructions as provided by nursing for diet, labs, medications, activity, wound care and any outpatient referrals.        Rhonda Lawton, [place]  8/7/2017  2:09 PM

## 2017-08-07 NOTE — PROGRESS NOTES
TRANSFER - OUT REPORT:    Verbal report given to BEAU Ely(name) on Darline Adam. Report consisted of patients Situation, Background, Assessment and   Recommendations(SBAR). Information from the following report(s) SBAR, Kardex, Intake/Output and MAR was reviewed with the receiving nurse. Lines:   PICC Double Lumen 91/73/51 Right;Basilic (Active)   Central Line Being Utilized Yes 8/7/2017 10:40 AM   Criteria for Appropriate Use Limited/no vessel suitable for conventional peripheral access 8/7/2017 10:40 AM   Site Assessment Clean, dry, & intact 8/7/2017 10:40 AM   Phlebitis Assessment 0 8/7/2017 10:40 AM   Infiltration Assessment 0 8/7/2017 10:40 AM   Date of Last Dressing Change 08/02/17 8/5/2017 12:01 AM   Dressing Status Clean, dry, & intact 8/7/2017 10:40 AM   Action Taken Tubing changed 8/5/2017 12:01 AM   Dressing Type Disk with Chlorhexadine gluconate (CHG) 8/6/2017  8:08 PM   Hub Color/Line Status Purple 8/7/2017 10:40 AM   Positive Blood Return (Site #1) Yes 8/7/2017 10:40 AM   Hub Color/Line Status Green 8/7/2017 10:40 AM   Positive Blood Return (Site #2) Yes 8/7/2017 10:40 AM   Alcohol Cap Used No 8/5/2017 12:01 AM        Opportunity for questions and clarification was provided.

## 2017-08-07 NOTE — PROGRESS NOTES
Problem: Self Care Deficits Care Plan (Adult)  Goal: *Acute Goals and Plan of Care (Insert Text)  Occupational Therapy Goals  Initiated 8/3/2017 within 7 day(s). 1. Patient will perform LE bathing with supervision  2. Patient will perform toilet transfers/toileting with supervision using rolling walker  3. Patient will perform lower body dressing with supervision   Outcome: Progressing Towards Goal  OCCUPATIONAL THERAPY TREATMENT     Patient: Eduardo Saavedra (53 y.o. male)  Date: 8/7/2017  Diagnosis: Severe sepsis with septic shock  Sepsis ([facility] [address]  DX <principal problem not specified>  Procedure(s) (LRB):  REMOVAL OF LEFT LEG INFECTED GRAFT (Left) 13 Days Post-Op  Precautions: Fall  Chart, occupational therapy assessment, plan of care, and goals were reviewed. ASSESSMENT:  Pt presented supine in bed with HOB raised agreeable to skilled OT services this date. Pt c/o BLE pain during treatment session. Nursing notified of leg pain. Pt moved supine in bed -> EOB Supervision. Pt sat EOB and donned underpants, leaning forward to thread each foot into underpants. Pt then stood SBA to pull underpants over hips and buttocks. Pt utilized FWW to move to [facility] and simulated transfer to [facility]. Pt moved back to EOB. Pt sat EOB Supervision. Pt moved EOB -> Supine in bed Supervision. Pt left comfortable in bed with call bell within reach. Progression toward goals:  [X]          Improving appropriately and progressing toward goals  [ ]          Improving slowly and progressing toward goals  [ ]          Not making progress toward goals and plan of care will be adjusted       PLAN:  Patient continues to benefit from skilled intervention to address the above impairments. Continue treatment per established plan of care. Discharge Recommendations:  Rehab and Inpatient Rehab  Further Equipment Recommendations for Discharge:  N/A      G-CODES:      Self Care  Current  CJ= 20-39%.   The severity rating is based on the Level of Assistance required for Functional Mobility and ADLs. SUBJECTIVE:   Patient stated My daughter should be here any minute.       OBJECTIVE DATA SUMMARY:   Cognitive/Behavioral Status:  Neurologic State: Alert  Orientation Level: Oriented X4  Cognition: Appropriate decision making, Appropriate for age attention/concentration, Appropriate safety awareness, Follows commands  Safety/Judgement: Fall prevention     Functional Mobility and Transfers for ADLs:              Bed Mobility:  Rolling: Supervision  Supine to Sit: Supervision  Sit to Supine: Supervision                 Transfers:  Sit to Stand: Stand-by asssistance                             Toilet Transfer : Stand-by asssistance (simulated with FWW)                Balance:  Sitting: Intact  Standing: Intact  Standing - Static: Fair  Standing - Dynamic : Fair     ADL Intervention:     Lower Body Dressing Assistance  Underpants: Supervision/set-up        Pain:  Pt reports 7/10 pain or discomfort prior to treatment. Pt reports 7/10 pain or discomfort post treatment. Activity Tolerance:    Fair+     Please refer to the flowsheet for vital signs taken during this treatment.   After treatment:   [ ]  Patient left in no apparent distress sitting up in chair  [X]  Patient left in no apparent distress in bed  [X]  Call bell left within reach  [X]  Nursing notified  [ ]  Caregiver present  [ ]  Bed alarm activated           LEA Henry  Time Calculation: 29 mins

## 2017-08-07 NOTE — ROUTINE PROCESS
Pt is up in bed, family at the bedside, daughter pushed dad in wheelchair in the hallway, wound vac is intact and patent, will continue to monitor.

## 2017-08-07 NOTE — PROGRESS NOTES
CM called ARU, spoke to Wendy Angulo about possible pt's transfer for post acute care. She stated, pt's Kostas Howell is still valid through today and they, most likely, will be able to take the patient, she will be contacting back later with final decision. 1:06 PM patient was accepted to ARU, room 178 at 3 pm; primary nurse to call report to # [phone].   1:19 PM Call placed to Dr. Jolly Lubin office.

## 2017-08-07 NOTE — ROUTINE PROCESS
Patient is alert and oriented x 4. Denies any acute distress, SOB or chest pain. VSS. Palpable pedal pulses. Lungs clear. Bowel sound are active in all four quadrants. Patient tolerating Cardiac diet. No complaints of Nausea or vomiting. Voiding without any difficulty. Wound vac to Left  Upper leg and groin changed today and is CDI, Top incision is 1/2 cm depth, 5 1/2 cm  length and 1 cm in width, Bottom incision is 1/1/2 cm in width and 5 1/2cm in length and 1 cm in depth No neurological deficits noted. Beds in low position, call bell is within reach, will continue to monitor.

## 2017-08-07 NOTE — ROUTINE PROCESS
Four eyes skin assessment was done with [facility], LPN. Wound vac was noted on left groin and left thigh, two Meplix dressing on right leg, one Meplix dressing on right abdomen. No pressure ulcer noted.

## 2017-08-07 NOTE — ROUTINE PROCESS
Pt was given discharge instruction and prescriptions. Verbalizes understanding, PICC line is in place, armband removed and shredded.

## 2017-08-08 ENCOUNTER — DOCUMENTATION ONLY (OUTPATIENT)
Dept: FAMILY MEDICINE CLINIC | Age: 57
End: 2017-08-08

## 2017-08-08 PROBLEM — L97.419 CHRONIC ULCER OF RIGHT HEEL (HCC): Chronic | Status: ACTIVE | Noted: 2017-08-08

## 2017-08-08 LAB
ANION GAP BLD CALC-SCNC: 11 MMOL/L (ref 3–18)
BACTERIA SPEC CULT: NORMAL
BACTERIA SPEC CULT: NORMAL
BASOPHILS # BLD AUTO: 0 K/UL (ref 0–0.06)
BASOPHILS # BLD: 0 % (ref 0–2)
BUN SERPL-MCNC: 7 MG/DL (ref 7–18)
BUN/CREAT SERPL: 11 (ref 12–20)
CALCIUM SERPL-MCNC: 8.8 MG/DL (ref 8.5–10.1)
CHLORIDE SERPL-SCNC: 93 MMOL/L (ref 100–108)
CO2 SERPL-SCNC: 25 MMOL/L (ref 21–32)
CREAT SERPL-MCNC: 0.65 MG/DL (ref 0.6–1.3)
DIFFERENTIAL METHOD BLD: ABNORMAL
EOSINOPHIL # BLD: 0 K/UL (ref 0–0.4)
EOSINOPHIL NFR BLD: 0 % (ref 0–5)
ERYTHROCYTE [DISTWIDTH] IN BLOOD BY AUTOMATED COUNT: 20.8 % (ref 11.6–14.5)
FERRITIN SERPL-MCNC: 1105 NG/ML (ref 8–388)
GLUCOSE BLD STRIP.AUTO-MCNC: 101 MG/DL (ref 70–110)
GLUCOSE BLD STRIP.AUTO-MCNC: 121 MG/DL (ref 70–110)
GLUCOSE BLD STRIP.AUTO-MCNC: 98 MG/DL (ref 70–110)
GLUCOSE SERPL-MCNC: 90 MG/DL (ref 74–99)
HCT VFR BLD AUTO: 27 % (ref 36–48)
HGB BLD-MCNC: 8.7 G/DL (ref 13–16)
INR PPP: 2.5 (ref 0.8–1.2)
IRON SATN MFR SERPL: 13 %
IRON SERPL-MCNC: 28 UG/DL (ref 50–175)
LYMPHOCYTES # BLD AUTO: 13 % (ref 21–52)
LYMPHOCYTES # BLD: 1.8 K/UL (ref 0.9–3.6)
MAGNESIUM SERPL-MCNC: 1.6 MG/DL (ref 1.6–2.6)
MCH RBC QN AUTO: 28.4 PG (ref 24–34)
MCHC RBC AUTO-ENTMCNC: 32.2 G/DL (ref 31–37)
MCV RBC AUTO: 88.2 FL (ref 74–97)
MONOCYTES # BLD: 0.8 K/UL (ref 0.05–1.2)
MONOCYTES NFR BLD AUTO: 6 % (ref 3–10)
NEUTS SEG # BLD: 11.2 K/UL (ref 1.8–8)
NEUTS SEG NFR BLD AUTO: 81 % (ref 40–73)
PLATELET # BLD AUTO: 526 K/UL (ref 135–420)
PMV BLD AUTO: 9.5 FL (ref 9.2–11.8)
POTASSIUM SERPL-SCNC: 4.4 MMOL/L (ref 3.5–5.5)
PROTHROMBIN TIME: 25.4 SEC (ref 11.5–15.2)
RBC # BLD AUTO: 3.06 M/UL (ref 4.7–5.5)
RETICS/RBC NFR AUTO: 8.8 % (ref 0.5–2.3)
SERVICE CMNT-IMP: NORMAL
SERVICE CMNT-IMP: NORMAL
SODIUM SERPL-SCNC: 129 MMOL/L (ref 136–145)
TIBC SERPL-MCNC: 211 UG/DL (ref 250–450)
WBC # BLD AUTO: 13.8 K/UL (ref 4.6–13.2)

## 2017-08-08 PROCEDURE — 74011250636 HC RX REV CODE- 250/636: Performed by: INTERNAL MEDICINE

## 2017-08-08 PROCEDURE — 80048 BASIC METABOLIC PNL TOTAL CA: CPT | Performed by: INTERNAL MEDICINE

## 2017-08-08 PROCEDURE — 82728 ASSAY OF FERRITIN: CPT | Performed by: INTERNAL MEDICINE

## 2017-08-08 PROCEDURE — 82962 GLUCOSE BLOOD TEST: CPT

## 2017-08-08 PROCEDURE — 85610 PROTHROMBIN TIME: CPT | Performed by: INTERNAL MEDICINE

## 2017-08-08 PROCEDURE — 85025 COMPLETE CBC W/AUTO DIFF WBC: CPT | Performed by: INTERNAL MEDICINE

## 2017-08-08 PROCEDURE — 74011250637 HC RX REV CODE- 250/637: Performed by: INTERNAL MEDICINE

## 2017-08-08 PROCEDURE — 97166 OT EVAL MOD COMPLEX 45 MIN: CPT

## 2017-08-08 PROCEDURE — 97530 THERAPEUTIC ACTIVITIES: CPT

## 2017-08-08 PROCEDURE — 74011000250 HC RX REV CODE- 250: Performed by: INTERNAL MEDICINE

## 2017-08-08 PROCEDURE — 97535 SELF CARE MNGMENT TRAINING: CPT

## 2017-08-08 PROCEDURE — 83735 ASSAY OF MAGNESIUM: CPT | Performed by: INTERNAL MEDICINE

## 2017-08-08 PROCEDURE — 85045 AUTOMATED RETICULOCYTE COUNT: CPT | Performed by: INTERNAL MEDICINE

## 2017-08-08 PROCEDURE — 97110 THERAPEUTIC EXERCISES: CPT

## 2017-08-08 PROCEDURE — 97116 GAIT TRAINING THERAPY: CPT

## 2017-08-08 PROCEDURE — 77030011256 HC DRSG MEPILEX <16IN NO BORD MOLN -A

## 2017-08-08 PROCEDURE — 83540 ASSAY OF IRON: CPT | Performed by: INTERNAL MEDICINE

## 2017-08-08 PROCEDURE — 65310000000 HC RM PRIVATE REHAB

## 2017-08-08 PROCEDURE — 97542 WHEELCHAIR MNGMENT TRAINING: CPT

## 2017-08-08 PROCEDURE — 97161 PT EVAL LOW COMPLEX 20 MIN: CPT

## 2017-08-08 RX ORDER — LANOLIN ALCOHOL/MO/W.PET/CERES
400 CREAM (GRAM) TOPICAL ONCE
Status: COMPLETED | OUTPATIENT
Start: 2017-08-08 | End: 2017-08-08

## 2017-08-08 RX ORDER — WARFARIN SODIUM 5 MG/1
5 TABLET ORAL ONCE
Status: COMPLETED | OUTPATIENT
Start: 2017-08-08 | End: 2017-08-08

## 2017-08-08 RX ORDER — NAPROXEN 250 MG/1
375 TABLET ORAL 2 TIMES DAILY WITH MEALS
Status: COMPLETED | OUTPATIENT
Start: 2017-08-08 | End: 2017-08-10

## 2017-08-08 RX ORDER — LANOLIN ALCOHOL/MO/W.PET/CERES
400 CREAM (GRAM) TOPICAL DAILY
Status: DISCONTINUED | OUTPATIENT
Start: 2017-08-09 | End: 2017-08-17 | Stop reason: HOSPADM

## 2017-08-08 RX ORDER — LOSARTAN POTASSIUM 50 MG/1
50 TABLET ORAL DAILY
Status: DISCONTINUED | OUTPATIENT
Start: 2017-08-09 | End: 2017-08-17 | Stop reason: HOSPADM

## 2017-08-08 RX ORDER — SODIUM CHLORIDE TAB 1 GM 1 G
1 TAB MISCELLANEOUS 2 TIMES DAILY WITH MEALS
Status: DISCONTINUED | OUTPATIENT
Start: 2017-08-08 | End: 2017-08-17 | Stop reason: HOSPADM

## 2017-08-08 RX ORDER — LOSARTAN POTASSIUM 50 MG/1
25 TABLET ORAL ONCE
Status: COMPLETED | OUTPATIENT
Start: 2017-08-08 | End: 2017-08-08

## 2017-08-08 RX ADMIN — LOSARTAN POTASSIUM 25 MG: 50 TABLET ORAL at 05:53

## 2017-08-08 RX ADMIN — POLYETHYLENE GLYCOL 3350 17 G: 17 POWDER, FOR SOLUTION ORAL at 18:17

## 2017-08-08 RX ADMIN — DOCUSATE SODIUM 100 MG: 100 CAPSULE, LIQUID FILLED ORAL at 17:42

## 2017-08-08 RX ADMIN — CEFAZOLIN SODIUM 2 G: 2 SOLUTION INTRAVENOUS at 03:49

## 2017-08-08 RX ADMIN — METOPROLOL TARTRATE 12.5 MG: 25 TABLET ORAL at 09:23

## 2017-08-08 RX ADMIN — DOCUSATE SODIUM 100 MG: 100 CAPSULE, LIQUID FILLED ORAL at 09:20

## 2017-08-08 RX ADMIN — OXYCODONE HYDROCHLORIDE 20 MG: 5 TABLET ORAL at 12:28

## 2017-08-08 RX ADMIN — ZINC SULFATE 220 MG (50 MG) CAPSULE 220 MG: CAPSULE at 09:21

## 2017-08-08 RX ADMIN — ERYTHROPOIETIN 10000 UNITS: 10000 INJECTION, SOLUTION INTRAVENOUS; SUBCUTANEOUS at 15:45

## 2017-08-08 RX ADMIN — SODIUM CHLORIDE TAB 1 GM 1 G: 1 TAB at 17:42

## 2017-08-08 RX ADMIN — OXYCODONE HYDROCHLORIDE 20 MG: 5 TABLET ORAL at 15:45

## 2017-08-08 RX ADMIN — AMIODARONE HYDROCHLORIDE 200 MG: 200 TABLET ORAL at 09:23

## 2017-08-08 RX ADMIN — Medication 1 CAPSULE: at 09:20

## 2017-08-08 RX ADMIN — DILTIAZEM HYDROCHLORIDE 30 MG: 30 TABLET, FILM COATED ORAL at 16:00

## 2017-08-08 RX ADMIN — ATORVASTATIN CALCIUM 40 MG: 40 TABLET, FILM COATED ORAL at 20:35

## 2017-08-08 RX ADMIN — FUROSEMIDE 20 MG: 20 TABLET ORAL at 05:54

## 2017-08-08 RX ADMIN — FERROUS SULFATE TAB 325 MG (65 MG ELEMENTAL FE) 325 MG: 325 (65 FE) TAB at 09:24

## 2017-08-08 RX ADMIN — POTASSIUM CHLORIDE 20 MEQ: 20 TABLET, EXTENDED RELEASE ORAL at 17:41

## 2017-08-08 RX ADMIN — Medication 400 MG: at 13:03

## 2017-08-08 RX ADMIN — DILTIAZEM HYDROCHLORIDE 30 MG: 30 TABLET, FILM COATED ORAL at 12:41

## 2017-08-08 RX ADMIN — CYANOCOBALAMIN TAB 1000 MCG 1000 MCG: 1000 TAB at 09:21

## 2017-08-08 RX ADMIN — Medication 250 MG: at 09:22

## 2017-08-08 RX ADMIN — POTASSIUM CHLORIDE 20 MEQ: 20 TABLET, EXTENDED RELEASE ORAL at 09:20

## 2017-08-08 RX ADMIN — GABAPENTIN 300 MG: 300 CAPSULE ORAL at 23:15

## 2017-08-08 RX ADMIN — DILTIAZEM HYDROCHLORIDE 30 MG: 30 TABLET, FILM COATED ORAL at 23:14

## 2017-08-08 RX ADMIN — WARFARIN SODIUM 5 MG: 5 TABLET ORAL at 17:47

## 2017-08-08 RX ADMIN — ACETAMINOPHEN 650 MG: 325 TABLET ORAL at 10:11

## 2017-08-08 RX ADMIN — OXYCODONE HYDROCHLORIDE 15 MG: 5 TABLET ORAL at 20:36

## 2017-08-08 RX ADMIN — ENOXAPARIN SODIUM 40 MG: 40 INJECTION SUBCUTANEOUS at 05:53

## 2017-08-08 RX ADMIN — GABAPENTIN 300 MG: 300 CAPSULE ORAL at 05:54

## 2017-08-08 RX ADMIN — NAPROXEN 375 MG: 250 TABLET ORAL at 17:39

## 2017-08-08 RX ADMIN — CEFAZOLIN SODIUM 2 G: 2 SOLUTION INTRAVENOUS at 12:43

## 2017-08-08 RX ADMIN — METOPROLOL TARTRATE 12.5 MG: 25 TABLET ORAL at 22:00

## 2017-08-08 RX ADMIN — OXYCODONE HYDROCHLORIDE 20 MG: 5 TABLET ORAL at 08:16

## 2017-08-08 RX ADMIN — GABAPENTIN 300 MG: 300 CAPSULE ORAL at 15:15

## 2017-08-08 RX ADMIN — LOSARTAN POTASSIUM 25 MG: 50 TABLET ORAL at 13:02

## 2017-08-08 RX ADMIN — ALTEPLASE 1 MG: 2.2 INJECTION, POWDER, LYOPHILIZED, FOR SOLUTION INTRAVENOUS at 18:16

## 2017-08-08 RX ADMIN — CEFAZOLIN SODIUM 2 G: 2 SOLUTION INTRAVENOUS at 20:21

## 2017-08-08 RX ADMIN — VITAMIN D, TAB 1000IU (100/BT) 1000 UNITS: 25 TAB at 09:24

## 2017-08-08 RX ADMIN — DULOXETINE HYDROCHLORIDE 60 MG: 30 CAPSULE, DELAYED RELEASE ORAL at 09:22

## 2017-08-08 RX ADMIN — DILTIAZEM HYDROCHLORIDE 30 MG: 30 TABLET, FILM COATED ORAL at 06:55

## 2017-08-08 RX ADMIN — ASPIRIN 81 MG 81 MG: 81 TABLET ORAL at 09:21

## 2017-08-08 NOTE — PROGRESS NOTES
conducted an initial consultation and Spiritual Assessment for Iesha Arenas, who is a 59 y.o.,male. Patients Primary Language is: [place]. According to the patients EMR Jewish Affiliation is: No preference.      The reason the Patient came to the hospital is:   Patient Active Problem List    Diagnosis Date Noted    Chronic ulcer of right heel ([address] 08/08/2017    Chronic anemia     Acute blood loss as cause of postoperative anemia 07/25/2017    Aftercare following surgery of the circulatory system 07/25/2017    Impaired mobility and ADLs 07/24/2017    Infection of vascular bypass graft ([address] 07/24/2017    Sepsis associated with internal vascular access ([address] 07/24/2017    Pseudoaneurysm of femoral artery ([address] 06/27/2017    History of vitamin D deficiency 04/22/2017    Status post femoral-popliteal bypass surgery 04/21/2017    Anticoagulated on Coumadin     Ischemic cardiomyopathy     Chronic systolic heart failure (HCC)     Carotid artery disease (HCC)     Dyslipidemia     Hyperammonemia ([address] 04/18/2017    History of cardioversion 04/18/2017    Chronic atrial fibrillation ([address]     Critical ischemia of lower extremity 04/10/2017    Euthyroid sick syndrome 04/06/2017    Chronic ulcer of right foot ([address] 04/04/2017    Coronary artery disease involving native coronary artery of native heart 03/15/2017    Aortoiliac occlusive disease ([address] 01/25/2017    Atherosclerosis of native artery of both lower extremities with intermittent claudication ([address] 01/25/2017    Peripheral artery disease ([address] 01/25/2017    Hereditary peripheral neuropathy 11/15/2016    Erectile dysfunction 07/05/2016    Spinal stenosis of lumbar region with radiculopathy 05/04/2015    Benign hypertensive heart disease with systolic congestive heart failure, NYHA class 2 ([address] 01/26/2015    DDD (degenerative disc disease), lumbar 01/26/2015        The  provided the following Interventions:  Initiated a relationship of care and support. Explored issues of dl, belief, spirituality and Synagogue/ritual needs while hospitalized. Listened empathically. Provided chaplaincy education. Provided information about Spiritual Care Services. Offered assurance of continued prayers on patient's behalf. Chart reviewed. The following outcomes where achieved:  Patient shared limited information about both their medical narrative and spiritual journey/beliefs. Patient processed feeling about current hospitalization. Patient expressed gratitude for 's visit. Assessment:  Patient does not have any Synagogue/cultural needs that will affect patients preferences in health care. There are no spiritual or Synagogue issues which require intervention at this time. Plan:  Chaplains will continue to follow and will provide pastoral care on an as needed/requested basis.  recommends bedside caregivers page  on duty if patient shows signs of acute spiritual or emotional distress.     [address] [facility]  Spiritual Care   [phone]

## 2017-08-08 NOTE — PROGRESS NOTES
1200 noon Difficulty with aspiration of blood from either port of PICC line. Unable to flush red port. Purple port flushes with difficulty. Dr. Linda Mcdaniels notified.

## 2017-08-08 NOTE — INTERDISCIPLINARY ROUNDS
[facility] PHYSICAL REHABILITATION  [address] [place] [address], [address]    INPATIENT REHABILITATION  PRE-TEAM CONFERENCE SUMMARY     Date of Conference: 8/9/2017    Name: Mariella Chan Age / Sex: 59 y.o. / male   CSN: [id number] MRN: [id number]   [facility] Date: 8/7/2017 Length of Stay: 1 day     Primary Rehabilitation Diagnosis  1. Impaired Mobility and ADLs  2. S/P Removal of infected graft; Repair of left superficial femoral artery; Repair of left common femoral artery (7/25/2017 - Dr. Khang Oneal);  History of sepsis associated with infection of vascular bypass graft     Comorbidities   Benign hypertensive heart disease with systolic congestive heart failure, NYHA class 2  I11.0, I50.20    DDD (degenerative disc disease), lumbar M51.36    Erectile dysfunction N52.9    Spinal stenosis of lumbar region with radiculopathy M48.06, M54.16    Hereditary peripheral neuropathy G60.9    Aortoiliac occlusive disease  I74.09    Atherosclerosis of native artery of both lower extremities with intermittent claudication  I70.213    Peripheral artery disease  I73.9    Coronary artery disease involving native coronary artery of native heart I25.10    Chronic atrial fibrillation  I48.2    Acute blood loss as cause of postoperative anemia D62    Impaired mobility and ADLs Z74.09    Anticoagulated on Coumadin Z51.81, Z79.01    Ischemic cardiomyopathy I25.5    Chronic systolic heart failure  E58.96    Carotid artery disease  I77.9    Hyperammonemia  E72.20    Dyslipidemia E78.5    Euthyroid sick syndrome E07.81    Aftercare following surgery of the circulatory system Z48.812    Status post femoral-popliteal bypass surgery Z95.828    History of cardioversion Z98.890    Critical ischemia of lower extremity I99.8    Chronic ulcer of right foot  L97.519    History of vitamin D deficiency Z86.39    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    Chronic anemia D64.9    Chronic ulcer of right heel  L97.419          Therapy:     FIM SCORES Initial Assessment Weekly Progress Assessment 8/8/2017   Eating Functional Level: 6  6 - Mod I   Swallowing     Grooming    5 - Setup   Bathing 4  4 - Min A      Upper Body Dressing Functional Level: 5  Items Applied/Steps Completed: Pullover (4 steps)  Comments: Setup only  5 - Setup   Lower Body Dressing Functional Level: 3  Items Applied/Steps Completed: Elastic waist pants (3 steps), Sock, left (1 step), Sock, right (1 step)  Comments: Pt able to jasmeet socks and thread LEs into shorts while seated on EOB with slight assistance for management of wound vac lines. Pt requires Min-Mod A for standing balance while standing with RW and additional assistance for managing clothing over buttocks due to pt requiring constant BUE support on the walker in standing. 3 - Mod A   Toileting Functional Level: 2  Comments: Based on performance of LB bathing and dressing, pt would require maximal assistance for clothing management and thorough hygiene with Min-Mod A for static standing balance while assistance is provided for clothing. 2 - Max A   Bladder  level of assist 6     Bladder  accident frequency score 6     Bowel  level of assist 2     Bowel  accident frequency score 6     Toilet Transfer [employer]s Toilet Transfer Score: 3  Comments: Based on bed to w/c transfer, pt would require Mod A to perform stand step transfer to/from [facility] utilizing RW. Mod A required due to impaired balance, posterior lean, decreased strength, and inability to weightbear on LLE. 3 - Mod A   Tub/Shower Transfer Stearns Tub/Shower Transfer Score: 3  Comments: Based on bed to w/c transfer, pt would require Mod A for balance to perform stand step transfer using RW due to impaired balance, posterior lean, decreased strength, and inability to weightbear on LLE this session.   3 - Mod A      Comprehension Primary Mode of Comprehension: Auditory  Score: 6 Auditory  6   Expression Primary Mode of Expression: Verbal  Score: 6 Verbal  6   Social Interaction Score: 5 6   Problem Solving Score: 5 5   Memory Score: 5 5     FIM SCORES Initial Assessment Weekly Progress Assessment 8/8/2017   Bed/Chair/Wheelchair Transfers Other: stand step with RW  Transfer Assistance : 3 (Moderate assistance ) (min-mod assist secondary to L LE pain)  Sit to Stand Assistance: Moderate assistance (min-mod assist secondary to L LE pain)  Car Transfers: Not tested  Car Type: N/A Other: stand step with RW  Transfer Assistance : 3 (Moderate assistance ) (min-mod assist secondary to L LE pain)  Sit to Stand Assistance:  Moderate assistance (min-mod assist secondary to L LE pain)  Car Transfers: Not tested  Car Type: N/A   Bed Mobility Rolling Right 5 (Supervision)   Rolling Left 5 (Supervision)   Supine to Sit 5 (Supervision)   Sit to Stand Moderate assistance (min-mod assist secondary to L LE pain)   Sit to Supine 5 (Supervision)    Rolling Right   5 (Supervision)   Rolling Left   5 (Supervision)   Supine to Sit   5 (Supervision)   Sit to Stand   Moderate assistance (min-mod assist secondary to L LE pain)   Sit to Supine   5 (Supervision)      Locomotion (W/C) Able to Propel (ft): 232 feet  Functional Level: 5  Curbs/Ramps Assist Required (FIM Score): 0 (Not tested)  Wheelchair Setup Assist Required : 5 (Supervision/setup)  Wheelchair Management: Manages left brake, Manages right brake Function 5  Setup Assistance  5 (Supervision/setup)      Locomotion (W/C distance) 232 Feet 232 feet   Locomotion (Walk) 4 (Minimal assistance) 4 (Minimal assistance)  Walker, rolling   Locomotion (Walk dist.) 12 Feet (ft) (x 2 repetitions) 12 Feet (ft) (x 2 repetitions)   Steps/Stairs Steps/Stairs Ambulated (#): 0  Level of Assist : 0 (Not tested)  Rail Use: None   Steps/Stairs Ambulated (#): 0  Level of Assist : 0 (Not tested)  Rail Use: None         Nursing:     Neuro:   A&O x____                   Respiratory:   [] WNL   [] O2   [] LPM ______ Other:  Peripheral Vascular:   [] TEDS present   [] Edema present ____ Grade   Cardiac:   [] WNL   [] Other  Genitourinary:   [] continent   [] incontinent   [] lopez  Abdominal _______ LBM  GI: _______ Diet ______ Liquids _____ tube feeds  Musculoskeletal: ____ ROM Transfers _____ Assistive Device Used  ____ Level of Assistance  Skin Integumentary:   [] Intact   [] Not Intact   __________Preventative Measures  Details______________________________________________________________  Pain: [] Controlled   [] Not Controlled   Pain Meds:   [] Scheduled   [] PRN        Registered Dietitian / Nutrition:   Patient Vitals for the past 100 hrs:   % Diet Eaten   08/08/17 1312 85 %   08/08/17 0910 100 %   08/07/17 1800 25 %               Supplements:          [] Yes   [] No      Amount of supplement consumed:        Intake/Output Summary (Last 24 hours) at 08/08/17 1550  Last data filed at 08/08/17 1312   Gross per 24 hour   Intake              518 ml   Output             1750 ml   Net            -1232 ml                              Last bowel movement:         Interdisciplinary Team Goals:     1. Goal  Encourage pt to perform sit <> stand transfers with CGA while maintaining equal B LE weight bearing. Barrier  L LE pain, Impaired strength, Impaired balance, Impaired transfers    Intervention  Education, Strength and Balance training, Transfer training     2. Goal  Encourage pt to stand during ADLs with CGA. Barrier  LLE pain, Impaired strength, Impaired balance    Intervention  ADL training, Therapeutic activity, Therapeutic exercise     3. Goal      Barrier      Intervention       4. Goal      Barrier      Intervention       5. Goal      Barrier      Intervention       6. Goal      Barrier      Intervention         Disposition / Discharge Planning:      Follow-up therapy services:  tbd   DME recommendations:  tbd    Estimated discharge date:  tbd   Discharge Location:  home         Electronic Signatures: Signature Date Signed   Physical Therapist    Thor Easton PT, DPT 8/08/2017   Occupational Therapist    Cliff Webster, [address]  8/9/2017   Speech Therapist         Recreational Therapist         Nursing         Dietitian         Clinical Psychologist         Physician    Zachery Atkins MD   8/8/2017        ARNOLD Barnes  8/8/17         The above information has been reviewed with the patient in a language that they can understand. Opportunity for comments and questions has been provided and a signed attestation has been scanned into the \"media tab\" of the EMR.       Patient Signature: ______________________________________________________    Date Signed: __________________________________________________________

## 2017-08-08 NOTE — PROGRESS NOTES
Problem: Mobility Impaired (Adult and Pediatric)  Goal: *Acute Goals and Plan of Care (Insert Text)  Physical Therapy Goals  Short Term Goals  Initiated 8/8/2017 and to be accomplished within 5-7 day(s) (8/15/2017)  1. Patient will move from supine to sit and sit to supine , scoot up and down and roll side to side in bed with modified independence. 2. Patient will transfer from bed to chair and chair to bed with contact guard assist using the least restrictive device. 3. Patient will perform sit to stand with contact guard assist.  4. Patient will ambulate with contact guard assist for 150 feet with the least restrictive device. 5. Patient will ascend/descend 4-6 stairs with B handrail(s) with minimal assistance/contact guard assist.    Long Term Goals  Initiated 8/8/2017 and to be accomplished within 10-14 day(s) (8/22/2017)  1. Patient will move from supine to sit and sit to supine , scoot up and down and roll side to side in bed with independence. 2. Patient will transfer from bed to chair and chair to bed with modified independence using the least restrictive device. 3. Patient will perform sit to stand with modified independence. 4. Patient will ambulate with modified independence for 300 feet with the least restrictive device. 5. Patient will ascend/descend 12 stairs with B handrail(s) with modified independence. PHYSICAL THERAPY EXAMINATION     Time In: 1101  Time Out: 1050  Patient seen for initial evaluation. Pt pleasant and cooperative throughout but notes worsening L LE/calf pain indicating 6/10 pain at rest and 10/10 pain with ambulation/weight bearing. Pt reports he felt otherwise his mobility had been improving since his hospitalization. Pt with noted reddened area of skin over L shin. Time In: 1330  Time Out: 1400  Patient seen for functional mobility training and therapeutic exercises. Patient Name: Diana Vargas  Patient Age: 59 y.o.   Past Medical History:   Past Medical History:   Diagnosis Date    Acute blood loss as cause of postoperative anemia 2017    Anticoagulated on Coumadin      Aortoiliac occlusive disease (HCC) 2017    Atherosclerosis of native artery of both lower extremities with intermittent claudication ([facility] 2017    Benign hypertensive heart disease with systolic congestive heart failure, NYHA class 2 ([facility] 2015    Carotid artery disease (HCC)      Chronic anemia      Chronic atrial fibrillation (HCC)      Chronic systolic heart failure (HCC)      Chronic ulcer of right foot ([facility] 2017    Coronary artery disease involving native coronary artery of native heart 3/15/2017     Successful stenting of Cx (Xience LESLEY) and RCA (Xience LESLEY) to 0% by Dr. Sudhakar Titus on 3/15/17.     DDD (degenerative disc disease), lumbar 2015    Dyslipidemia      Erectile dysfunction 2016    Euthyroid sick syndrome 2017    Hereditary peripheral neuropathy 11/15/2016    History of cardioversion 2017     S/P Synchronized external cardioversion (2017 - Dr. 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 Demi Gilbert        Pain at start of tx: 6/10 at rest and 10/10 with weight bearing for L LE/ calf (pt notes pain is not at wound site)  Pain at stop of tx: 6/10 at rest and 10/10 with weight bearing for L LE/ calf (pt notes pain is not at wound site)     Patient identified with name and : yes     Medical Diagnosis:  Sepsis  Sepsis associated with internal vascular access, subsequent encounter ([facility] Sepsis associated with internal vascular access [facility])          Therapy Diagnosis:   Difficulty with bed mobility [X]     Difficulty with functional transfers  [X]     Difficulty with ambulation  [X]     Difficulty with stair negotiations  [X]        Problem List:    Decreased strength B LE  [X]     Decreased strength trunk/core  [X]     Decreased endurance  [X]     Decreased balance standing  [X]     Pain   [X]     Slow ambulation velocity  [X]    Decreased coordination  [X]    Decreased safety awareness  [X]       Functional Limitations:   Decreased independence with bed mobility  [X]     Decreased independence with functional transfers  [X]     Decreased independence with ambulation  [X]     Decreased independence with stair negotiation  [X]        Previous Functional Level: Pt reports following recent hospitalization he had been independent with functional mobility at home. Home Environment: Home Environment: Apartment  # Steps to Enter: 5  One/Two Story Residence: Two story  # of Interior Steps: 12  Living Alone: No  Support Systems: Family member(s)  Patient Expects to be Discharged to[de-identified] Apartment  Current DME Used/Available at Home: Walker      Pt notes he will be d/c'ing to his daughter's home where she will be assisting him. Pt states his daughter has a two story home with 3 JACIEL with one handrail.      Barriers to Learning/Limitations: yes;  physical  Compensate with: visual, verbal, tactile, kinesthetic cues/model             Outcome Measures: FIM/MMT                 MMT Initial Assessment    Right Lower Extremity Left Lower Extremity   Hip Flexion 4 4   Knee Extension 4+ 4   Knee Flexion 4 3+ (2/2 calf pain with testing)   Ankle Dorsiflexion 4+ 4   0/5       No palpable muscle contraction  1/5       Palpable muscle contraction, no joint movement  2-/5      Less than full range of motion in gravity eliminated position  2/5       Able to complete full range of motion in gravity eliminated position  2+/5     Able to initiate movement against gravity  3-/5      More than half but not full range of motion against gravity  3/5       Able to complete full range of motion against gravity  3+/5     Completes full range of motion against gravity with minimal resistance  4-/5      Completes full range of motion against gravity with minimal-moderate resistance  4/5       Completes full range of motion against gravity with moderate resistance  4+/5     Completes full range of motion against gravity with moderate-maximum resistance  5/5       Completes full range of motion against gravity with maximum resistance                 AROM: WFL      FIM SCORES Initial Assessment   Bed/Chair/Wheelchair Transfers Other: stand step with RW  Transfer Assistance : 3 (Moderate assistance ) (min-mod assist secondary to L LE pain)  Sit to Stand Assistance: Moderate assistance (min-mod assist secondary to L LE pain)  Car Transfers: Not tested  Car Type: N/A   Wheelchair Mobility Able to Propel (ft): 232 feet  Functional Level: 5  Curbs/Ramps Assist Required (FIM Score): 0 (Not tested)  Wheelchair Setup Assist Required : 5 (Supervision/setup)  Wheelchair Management: Manages left brake, Manages right brake   Walking [place] 4 (Minimal assistance)   Steps/Stairs Steps/Stairs Ambulated (#): 0  Level of Assist : 0 (Not tested)  Rail Use: None   PRIMARY MODE OF LOCOMOTION: ambulation  Please see IRC Interdisciplinary Eval: Coordination/Balance Section for details regarding FIM score description. BED/CHAIR/WHEELCHAIR TRANSFERS Initial Assessment   Rolling Right 5 (Supervision) for safety with wound vac line management   Rolling Left 5 (Supervision) for safety with wound vac line management   Supine to Sit 5 (Supervision) for safety with wound vac line management   Sit to Stand Moderate assistance (min-mod assist secondary to L LE pain)   Pt demonstrates decreased L LE weight bearing throughout sit <> stand secondary to L LE pain and requires min to mod assist for weight shift and balance.    Sit to Supine 5 (Supervision) for safety with wound vac line management   Transfer Assist Score Other: stand step with RW  Transfer Assistance : 3 (Moderate assistance ) (min-mod assist secondary to L LE pain)  Sit to Stand Assistance: Moderate assistance (min-mod assist secondary to L LE pain)  Car Transfers: Not tested  Car Type: N/A   Transfer Type Stand Step with RW   Stand Step Commode transfer   Comments Pt requires min to mod assist for balance secondary to difficulty weight bearing through L LE with stand step transfer with RW and with commode transfer with B UE support on hand rail in standing for balance. Car Transfer Not tested   Car Type N/A          WHEELCHAIR MOBILITY/MANAGEMENT Initial Assessment   Able to Propel 232 feet with supervision for safety   Functional Level 5   Curbs/ramps assistance required 0 (Not tested)   Wheelchair set up assistance required 5 (Supervision/setup)   Wheelchair management Manages left brake, Manages right brake          WALKING INDEPENDENCE Initial Assessment   Assistive device Walker, rolling   Ambulation assistance - level surface 4 (Minimal assistance)   Distance 12 Feet (ft) (x 2 repetitions)   Functional Level 1   Comments Pt requires minimal assistance for balance and safety with RW management with pt ambulating with antalgic gait pattern with decreased L LE weight bearing. Ambulation assistance - unlevel surface NT          STEPS/STAIRS Initial Assessment   Steps/Stairs ambulated Steps/Stairs Ambulated (#): 0  Level of Assist : 0 (Not tested)  Rail Use: None   Rail Use None   Functional Level 0   Comments NT 2/2 pt with decreased WB tolerating during ambulation. Curbs/Ramps NT     During second treatment session, pt performed stand step transfers w/c <> mat table with RW with minimal assistance for balance with transition from sit to stand and stand to sit secondary to decreased L LE weight bearing tolerance and minimal assistance for safe RW management with advancement of LEs.   Pt also participated in therapeutic exercises in supine performing 2 sets of 15 repetitions of glut sets x 5\" holds and bridging with ball between B knees to promote neutral orientation of B LEs. PHYSICAL THERAPY PLAN OF CARE     Therapy Diagnosis:   Please see table above     Order received from MD for physical therapy services and chart reviewed. Pt to be seen 5 times per week for 3 hours of total therapy per day for 1-2 weeks. Thank you for the referral.     LTGs:     Please refer to POC for details. Pt would benefit from skilled physical therapy in order to improve independent functional mobility within the home. Interventions may include range of motion (AROM, PROM B LE/trunk), motor function (B LE/trunk strengthening/coordination), activity tolerance (vitals, oxygen saturation levels), bed mobility training, balance activities, gait training (progressive ambulation program), and functional transfer training. Please see IRC; Interdisciplinary Eval, Care Plan, and Patient Education for further information regarding physical therapy examination and plan of care.       Joselyn Aguiar, PT, DPT  8/8/2017

## 2017-08-08 NOTE — PROGRESS NOTES
Patient transitioned from inpatient to ARU on 8/7/2017  Nurse Navigator will continue to monitor during admission through [facility] record.

## 2017-08-08 NOTE — ROUTINE PROCESS
SHIFT CHANGE NOTE FOR [facility]    Bedside and Verbal shift change report given to Jorge Vera RN (oncoming nurse) by Cesar Kovacs RN   (offgoing nurse). Report included the following information SBAR, Kardex, MAR and Recent Results. Situation:   Code Status: Full Code   Reason for Admission: sepsis  Hospital Day: 1   Problem List:   Hospital Problems  Date Reviewed: 8/2/2017          Codes Class Noted POA    Acute blood loss as cause of postoperative anemia ICD-10-CM: D62  ICD-9-CM: 285.1  7/25/2017 Yes        Aftercare following surgery of the circulatory system ICD-10-CM: Z48.812  ICD-9-CM: V58.73  7/25/2017 Yes    Overview Addendum 8/7/2017  2:20 PM by Feliciano Interiano MD     S/P Removal of infected graft; Repair of left superficial femoral artery; Repair of left common femoral artery (7/25/2017 - Dr. Nando Weaver)             Impaired mobility and ADLs ICD-10-CM: Z74.09  ICD-9-CM: 799.89  7/24/2017 Yes        Infection of vascular bypass graft ([facility] ICD-10-CM: T82. 7XXA  ICD-9-CM: 996.62  7/24/2017 Yes    Overview Signed 8/7/2017  2:19 PM by Feliciano Interiano MD     Left lower extremity             * (Principal)Sepsis associated with internal vascular access [facility]) ICD-10-CM: T82. 7XXA, A41.9  ICD-9-CM: 996.62, 038.9  7/24/2017 Yes        Anticoagulated on Coumadin (Chronic) ICD-10-CM: Z51.81, Z79.01  ICD-9-CM: V58.83, V58.61  Unknown Yes        Chronic atrial fibrillation (HCC) (Chronic) ICD-10-CM: I48.2  ICD-9-CM: 427.31  Unknown Yes        Benign hypertensive heart disease with systolic congestive heart failure, NYHA class 2 (HCC) (Chronic) ICD-10-CM: I11.0, I50.20  ICD-9-CM: 402.11, 428.20, 428.0  1/26/2015 Yes              Background:   Past Medical History:   Past Medical History:   Diagnosis Date    Acute blood loss as cause of postoperative anemia 4/4/2017    Anticoagulated on Coumadin     Aortoiliac occlusive disease ([address] 1/25/2017    Atherosclerosis of native artery of both lower extremities with intermittent claudication ([address] 1/25/2017    Benign hypertensive heart disease with systolic congestive heart failure, NYHA class 2 ([address] 1/26/2015    Carotid artery disease (HCC)     Chronic anemia     Chronic atrial fibrillation (HCC)     Chronic systolic heart failure (HCC)     Chronic ulcer of right foot ([address] 4/4/2017    Coronary artery disease involving native coronary artery of native heart 3/15/2017    Successful stenting of Cx (Xience LESLEY) and RCA (Xience LESLEY) to 0% by Dr. Paula Alfaro on 3/15/17.  DDD (degenerative disc disease), lumbar 1/26/2015    Dyslipidemia     Erectile dysfunction 7/5/2016    Euthyroid sick syndrome 4/6/2017    Hereditary peripheral neuropathy 11/15/2016    History of cardioversion 4/18/2017    S/P Synchronized external cardioversion (4/18/2017 - Dr. Evelia Jovel)    History of vitamin D deficiency 4/22/2017    Vitamin D 25-Hydroxy (4/22/2017) = 12.0; Vitamin D 25-Hydroxy (5/26/2017) = 38.7    Infection of vascular bypass graft ([facility] 7/24/2017    Left lower extremity    Ischemic cardiomyopathy     Peripheral artery disease ([facility] 1/25/2017    Spinal stenosis of lumbar region with radiculopathy 5/4/2015    Dr. Sandi Hull      Patient taking anticoagulants yes   Patient has a defibrillator: no     Assessment:   Changes in Assessment throughout shift: no     Patient has central line: yes Reasons if yes: long term antibiotic  Insertion date:8/2/17 Last dressing date:8/2/17   Patient has Jiang Cath: no Reasons if yes:     Insertion date:     Last Vitals:     Vitals:    08/07/17 1719 08/07/17 2029 08/07/17 2031 08/07/17 2144   BP:  155/86 155/86 160/86   Pulse:  63 63 66   Resp:  18     Temp:  99 °F (37.2 °C)     SpO2:  95%     Weight: 75 kg (165 lb 5.5 oz)           PAIN    Pain Assessment    Pain Intensity 1: 0 (08/08/17 0000) Pain Intensity 1: 2 (12/29/14 1105)    Pain Location 1: Leg Pain Location 1: Abdomen    Pain Intervention(s) 1: Medication (see MAR) Pain Intervention(s) 1: Medication (see MAR)  Patient Stated Pain Goal: 0 Patient Stated Pain Goal: 0  o Intervention effective: no    o Other actions taken for pain: pain medicine     Skin Assessment  Skin color Skin Color: Appropriate for ethnicity  Condition/Temperature Skin Condition/Temp: Warm  Integrity Skin Integrity: Wound (add Wound LDA)  Turgor Turgor: Non-tenting  Weekly Pressure Ulcer Documentation  Pressure  Injury Documentation: No Pressure Injury Noted-Pressure Ulcer Prevention Initiated  Wound Prevention & Protection Methods  Orientation of wound Orientation of Wound Prevention: Posterior  Location of Prevention Location of Wound Prevention: Sacrum/Coccyx  Dressing Present Dressing Present : No  Dressing Status    Wound Offloading Wound Offloading (Prevention Methods): Bed, pressure redistribution/air     INTAKE/OUPUT    Date 08/07/17 0700 - 08/08/17 0659 08/08/17 0700 - 08/09/17 0659   Shift 0487-1129 7015-1961 24 Hour Total 7881-6103 4868-5520 24 Hour Total   I  N  T  A  K  E   P.O. 118  118         P.O. 118  118       Shift Total  (mL/kg) 118  (1.6)  118  (1.6)      O  U  T  P  U  T   Urine  (mL/kg/hr) 900  (1) 850 1750         Urine Voided          Urine Occurrence(s)  5 x 5 x       Stool            Stool Occurrence(s)  1 x 1 x       Shift Total  (mL/kg) 900  (12) 850  (11.3) 1750  (23.3)      NET -782 -850 -4502      Weight (kg) 75 75 75 75 75 75       Recommendations:  1. Patient needs and requests: pain medicine    2. Diet: Cardiac    3. Pending tests/procedures: no     4. Functional Level/Equipment: 1 x person assist    5. Estimated Discharge Date: TDB Posted on Whiteboard in Patients Room: no     HEALS Safety Check    A safety check occurred in the patient's room between off going nurse and oncoming nurse listed above.     The safety check included the below items  Area Items   H  High Alert Medications - Verify all high alert medication drips (heparin, PCA, etc.) E  Equipment - Suction is set up for ALL patients (with cailin)  - Red plugs utilized for all equipment (IV pumps, etc.)  - WOWs wiped down at end of shift.  - Room stocked with oxygen, suction, and other unit-specific supplies   A  Alarms - Bed alarm is set for fall risk patients  - Ensure chair alarm is in place and activated if patient is up in a chair   L  Lines - Check IV for any infiltration  - Jiang bag is empty if patient has a Jiang   - Tubing and IV bags are labeled   S  Safety   - Room is clean, patient is clean, and equipment is clean. - Hallways are clear from equipment besides carts. - Fall bracelet on for fall risk patients  - Ensure room is clear and free of clutter  - Suction is set up for ALL patients (with cailin)  - Hallways are clear from equipment besides carts.    - Isolation precautions followed, supplies available outside room, sign posted

## 2017-08-08 NOTE — PROGRESS NOTES
NUTRITION    Nutrition Consult: General Nutrition Management and Supplements     RECOMMENDATIONS / PLAN:     - No nutrition intervention indicated at this time. Will re-screen as appropriate. NUTRITION INTERVENTIONS & DIAGNOSIS:     Nutrition Diagnosis: No nutrition diagnosis at this time. ASSESSMENT:     Pt reported having good appetite and meal intake PTA. Had fair appetite and meal intake this morning at breakfast; consumed ~50% of meal per report. Pt on coumadin; vitamin K consistent diet discussed with pt; he reported being already familiar with dietary considerations.  Pt denied having any concerns at time of visit    Average po intake adequate to meet patients estimated nutritional needs:   [] Yes     [] No   [x] Unable to determine at this time    Diet: DIET CARDIAC Regular; High Pro/Pete Protein       Food Allergies:  None known   Current Appetite:   [] Good     [x] Fair (this morning)     [] Poor     [] Other:  Appetite/meal intake prior to admission:   [x] Good     [] Fair     []  Poor    [] Other:  Feeding Limitations:  [] Swallowing difficulty    [x] Chewing difficulty: missing teeth; but reported tolerating diet    [] Other:  Current Meal Intake:   Patient Vitals for the past 100 hrs:   % Diet Eaten   08/07/17 1800 25 %     BM: 8/8 - formed  Skin Integrity: right ankle wound, leg vascular wound; right lower leg wound; right abdomen wound; left groin wound; left thigh wound  Edema: none   Pertinent Medications: Reviewed: KCl, zinc sulfate, ferrous sulfate, cyanocobalamin, vitamin D3, vitamin C     Recent Labs      08/08/17   0636  08/07/17   0130  08/06/17   0130   NA  129*  133*  134*   K  4.4  3.9  3.8   CL  93*  95*  97*   CO2  25  29  29   GLU  90  90  99   BUN  7  8  7   CREA  0.65  0.67  0.72   CA  8.8  8.7  8.6   MG  1.6   --    --    ALB   --   2.4*  2.3*   SGOT   --   78*  98*   ALT   --   33  39       Intake/Output Summary (Last 24 hours) at 08/08/17 1102  Last data filed at 08/07/17 2200   Gross per 24 hour   Intake              118 ml   Output             1750 ml   Net            -1632 ml       Anthropometrics:  Ht Readings from Last 1 Encounters:   07/24/17 5' 10\" (1.778 m)     Last 3 Recorded Weights in this Encounter    08/07/17 1719   Weight: 75 kg (165 lb 5.5 oz)     Body mass index is 23.72 kg/(m^2). Weight History:  Pt denied experiencing any recent changes in weight PTA. Weight Metrics 8/7/2017 8/5/2017 7/24/2017 7/17/2017 7/12/2017 6/27/2017 6/22/2017   Weight 165 lb 5.5 oz 165 lb 2 oz - 156 lb 156 lb 14.4 oz - 165 lb   BMI 23.72 kg/m2 - 23.69 kg/m2 22.38 kg/m2 - 22.51 kg/m2 23.68 kg/m2        Admitting Diagnosis: Sepsis  Sepsis associated with internal vascular access, subsequent encounter [facility])  Pertinent PMHx: CAD, HTN, PAD    Education Needs:        [] None identified  [] Identified - Not appropriate at this time  [x]  Identified and addressed - refer to education log (coumadin and vitamin K consistent diet)  Learning Limitations:   [x] None identified  [] Identified    Cultural, Shinto & ethnic food preferences:  [x] None identified    [] Identified and addressed     ESTIMATED NUTRITION NEEDS:     Calories: 3833-3928 kcal (MSJx1.2-1.3) based on  [x] Actual BW 75 kg     [] IBW   Protein: 60-75 gm (0.8-1.2 gm/kg) based on  [x] Actual BW      [] IBW   Fluid: 1 mL/kcal     MONITORING & EVALUATION:     Nutrition Goal(s):   1. Po intake of meals will meet >75% of patient estimated nutritional needs within the next 7 days.   Outcome:  [] Met/Ongoing    []  Not Met/Progressing    [x] New/Initial Goal     Monitoring:   [x] Diet tolerance   [x] Meal intake   [] Supplement intake   [] GI symptoms/ability to tolerate po diet   [] Respiratory status   [] Plan of care      Previous Recommendations (for follow-up assessments only):     []   Implemented       []   Not Implemented (RD to address)     [] No Recommendation Made     Discharge Planning: cardiac diet  [x] Participated in care planning, discharge planning, & interdisciplinary rounds as appropriate      Tricia Fu, [address] [place] [address]  Pager: [phone]

## 2017-08-08 NOTE — PROGRESS NOTES
Able to aspirate 10 cc blood after alteplase instilled in purple port of PICC line. Then flushed with 10cc NS without difficulty.

## 2017-08-08 NOTE — PROGRESS NOTES
Problem: Self Care Deficits Care Plan (Adult)  Goal: *Acute Goals and Plan of Care (Insert Text)  Long Term Goals (to be met upon discharge date) in order to increase pts functional independence and safety, and decrease burden of care:  1. Pt will perform grooming with independence. 2. Pt will perform UB bathing with modified independence. 3. Pt will perform LB bathing with modified independence. 4. Pt will perform tub/shower transfer with modified independence. 5. Pt will perform UB dressing with independence. 6. Pt will perform LB dressing with modified independence. 7. Pt will perform toileting task with modified independence. 8. Pt will perform toilet transfer with modified independence. Short Term Weekly Goals for (8/8/2017 to 8/15/2017) in order to increase pts functional independence and safety, and decrease burden of care:   1. Pt will perform grooming with independence. 2. Pt will perform UB bathing with modified independence. 3. Pt will perform LB bathing with supervision. 4. Pt will perform tub/shower transfer with Min A.  5. Pt will perform UB dressing with independence. 6. Pt will perform LB dressing with Min A.  7. Pt will perform toileting task with Min A.  8. Pt will perform toilet transfer with Min A.  OCCUPATIONAL THERAPY EXAMINATION     Pt presented lying in bed when OT arrived for initial evaluation. Pt immediately reports severe L lower leg pain since last night with increased difficulty moving his L ankle. Bright red area noted to L shin and small blister noted on medial aspect of L ankle. Pt's nurse notified. Pt agreeable to participate in OT session. He completed EOB bathing and dressing tasks. While performing LB dressing, pt's wound vac was noted to be clamped. OT finished assisting pt with dressing and unclamped wound vac. Pt's nurse notified of all pt's complaints and setup of wound vac as it had been found.  Pt's nurse and pt both state they were unaware of the wound vac tubing being clamped. Patient Name: Meliton Fox  Patient Age: 59 y.o. Past Medical History:   Past Medical History:   Diagnosis Date    Acute blood loss as cause of postoperative anemia 4/4/2017    Anticoagulated on Coumadin      Aortoiliac occlusive disease (HCC) 1/25/2017    Atherosclerosis of native artery of both lower extremities with intermittent claudication ([facility] [address] 1/25/2017    Benign hypertensive heart disease with systolic congestive heart failure, NYHA class 2 ([facility] [address] 1/26/2015    Carotid artery disease (HCC)      Chronic anemia      Chronic atrial fibrillation (HCC)      Chronic systolic heart failure (HCC)      Chronic ulcer of right foot ([facility] [address] 4/4/2017    Coronary artery disease involving native coronary artery of native heart 3/15/2017     Successful stenting of Cx (Xience LESLEY) and RCA (Xience LESLEY) to 0% by Dr. Lupillo Ling on 3/15/17.     DDD (degenerative disc disease), lumbar 1/26/2015    Dyslipidemia      Erectile dysfunction 7/5/2016    Euthyroid sick syndrome 4/6/2017    Hereditary peripheral neuropathy 11/15/2016    History of cardioversion 4/18/2017     S/P Synchronized external cardioversion (4/18/2017 - Dr. Katya Vo)    History of vitamin D deficiency 4/22/2017     Vitamin D 25-Hydroxy (4/22/2017) = 12.0; Vitamin D 25-Hydroxy (5/26/2017) = 38.7    Infection of vascular bypass graft ([facility] [address] 7/24/2017     Left lower extremity    Ischemic cardiomyopathy      Peripheral artery disease ([facility] [address] 1/25/2017    Spinal stenosis of lumbar region with radiculopathy 5/4/2015     Dr. Ilene Joshi Diagnosis:  Sepsis  Sepsis associated with internal vascular access, subsequent encounter ([facility] [address] Sepsis associated with internal vascular access [facility])   Therapy Diagnosis:   Difficulty with ADLs  [X]     Difficulty with functional transfers  [X]     Difficulty with ambulation  [X]     Difficulty with IADLs  [X]        Problem List:    Decreased strength B UE  [X]     Decreased strength trunk/core  [X]     Decreased AROM   [ ]     Decreased endurance  [X]     Decreased balance sitting  [X]     Decreased balance standing  [X]     Pain   [X]     Decreased PROM  [ ]        Functional Limitations:   Decreased independence with ADL  [X]     Decreased independence with functional transfers  [X]     Decreased independence with ambulation  [X]     Decreased independence with IADL  [X]        Previous Functional Level:  Pt reports that prior to his hospitalization, he was living alone and able to perform all self-care, mobilizing without an AD. He states intent to move in with his daughter following discharge from ARU - She lives in a 2-story apartment, and pt indicates he would have a 2nd floor setup. Home Environment: Home Situation  Home Environment: Apartment  # Steps to Enter: 5  One/Two Story Residence: Two story  # of Interior Steps: 12  Living Alone: No  Support Systems: Family member(s)  Patient Expects to be Discharged to[de-identified] Apartment  Current DME Used/Available at Home: Walker     Precautions: Fall     Time In: 0800  Time Out: 0900    Time In: 1000  Time Out: 1030     Pain at start of 1st tx: 6/10 at rest; 10/10 when pt touches L leg  Pain at stop of 1st tx: Same    Pain at start of 2nd tx: 7/10 in LLE  Pain at stop of 2nd tx: 7/10 in LLE     Patient identified with name and : yes     Barriers to Learning/Limitations: yes;  cognitive and physical  Compensate with: visual, verbal, tactile, kinesthetic cues/model     Objective: 1st session: Pt seen for initial OT evaluation. Due to pt's significant increase in pain and decreased ability to bear weight on LLE, pt performed EOB ADLs; see below for ADL and functional transfer levels. 2nd session: Pt participated in seated reaching activity to attempt to gradually increase pt's ability to tolerate LLE weightbearing and challenge sitting balance.  Pt able to maintain balance on edge of w/c and perform anterior reaching to play the game without complaints of further increase in pain during reaching. Outcome Measures:                  MMT Initial Assessment    Right Upper Extremity  Left Upper Extremity    UE AROM  WFL; Grossly 4/5  WFL; Grossly 4/5   Shoulder flexion  -  -   Shoulder extension  -  -   Shoulder ABDuction  -  -   Shoulder ADDUction  -  -   Elbow Flexion  -  -   Elbow Extension  -  -   Wrist Extension/Flexion  -  -     -  -   0/5       No palpable muscle contraction  1/5       Palpable muscle contraction, no joint movement  2-/5      Less than full range of motion in gravity eliminated position  2/5       Able to complete full range of motion in gravity eliminated position  2+/5     Able to initiate movement against gravity  3-/5      More than half but not full range of motion against gravity  3/5       Able to complete full range of motion against gravity  3+/5     Completes full range of motion against gravity with minimal resistance  4-/5      Completes full range of motion against gravity with minimal-moderate resistance  4/5       Completes full range of motion against gravity with moderate resistance  4+/5     Completes full range of motion against gravity with moderate-maximum resistance  5/5       Completes full range of motion against gravity with maximum resistance     Sensation: Intact UEs  Coordination: Intact UEs      FIM SCORES Initial Assessment   Bladder - level of assist NT   Bladder - accident frequency score NT   Bowel - level of assist NT   Bowel - accident frequency score NT   Please see IRC Interdisciplinary Eval: Coordination/Balance Section for details regarding FIM score description.       COGNITION/PERCEPTION Initial Assessment   Premorbid Reading Status  -   Premorbid Writing Status  -   Arousal/Alertness Generalized responses   Orientation Level Oriented X4   Visual Fields  (Intact)   Praxis Intact (UEs)   Body Scheme Appears intact       COMPREHENSION MODE Initial Assessment   Primary Mode of Comprehension Auditory   Hearing Aide     Corrective Lenses     Score 6       EXPRESSION Initial Assessment   Primary Mode of Expression Verbal   Score 6   Comments         SOCIAL INTERACTION/PRAGMATICS Initial Assessment   Score 5   Comments         PROBLEM SOLVING Initial Assessment   Score 4   Comments         MEMORY Initial Assessment   Score 5   Comments         EATING Initial Assessment   Functional Level 5   Comments         GROOMING Initial Assessment   Functional Level  5     Oral Hygiene FIM:5   Tasks completed by patient Brushed teeth, Washed face   Comments Pt completed grooming while seated in w/c at the sink. BATHING Initial Assessment   Functional Level 4   Body parts patient bathed Abdomen, Arm, left, Arm, right, Chest, Lower leg and foot, left, Lower leg and foot, right, Joanie area, Thigh, left, Thigh, right   Comments Pt washed UB and B legs while seated on EOB with setup to provide items to pt and cues for thoroughness. He performed sit to stand to RW with Min-Mod A while OT washed pt's buttocks due to pt requiring constant BUE support on the walker for standing balance. TUB/SHOWER TRANSFER INDEPENDENCE Initial Assessment   Score 3   Comments Based on bed to w/c transfer, pt would require Mod A for balance to perform stand step transfer using RW due to impaired balance, posterior lean, decreased strength, and inability to weightbear on LLE this session. UPPER BODY DRESSING/UNDRESSING Initial Assessment   Functional Level 5   Items applied/Steps completed Pullover (4 steps)   Comments Setup only       LOWER BODY DRESSING/UNDRESSING Initial Assessment   Functional Level 3     Sock and/or Shoe Management FIM: 5   Items applied/Steps completed Elastic waist pants (3 steps), Sock, left (1 step), Sock, right (1 step)   Comments Pt able to jasmeet socks and thread LEs into shorts while seated on EOB with slight assistance for management of wound vac lines.  Pt requires Min-Mod A for standing balance while standing with RW and additional assistance for managing clothing over buttocks due to pt requiring constant BUE support on the walker in standing. TOILETING Initial Assessment   Functional Level 2   Comments Based on performance of LB bathing and dressing, pt would require maximal assistance for clothing management and thorough hygiene with Min-Mod A for static standing balance while assistance is provided for clothing. TOILET TRANSFER INDEPENDENCE Initial Assessment   Transfer score 3   Comments Based on bed to w/c transfer, pt would require Mod A to perform stand step transfer to/from [facility] utilizing RW. Mod A required due to impaired balance, posterior lean, decreased strength, and inability to weightbear on LLE. INSTRUMENTAL ADL Initial Assessment (PLOF)   Meal preparation  NT   Homemaking  NT   Medicine Management  NT   Financial Management  NT      OCCUPATIONAL THERAPY PLAN OF CARE  Areas to Assess:   Self Care/Functional transfer/IADL  [X]     NMRE/ estim  [ ]     UE Ther ex/Ther act  [X]     Cognitive Training  [ ]     Patient/Family/Caregiver Education  [X]       Order received from MD for occupational therapy services and chart reviewed. Pt to be seen 5 times per week for 3 hours of total therapy per day for 2 weeks. Thank you for the referral.     LTGs: See Care Plan     Pt would benefit from skilled occupational therapy in order to improve independent functional mobility/ADLs,/IADLs within the home. Interventions may include range of motion (AROM, PROM B UE), motor function (B UE/ strengthening/coordination), activity tolerance (vitals, oxygen saturation levels), balance training, ADL/IADL training and functional transfer training. Please see IRC; Interdisciplinary Eval, Care Plan, and Patient Education for further information regarding occupational therapy examination and plan of care.       Khalida Malone, JESE  8/8/2017

## 2017-08-08 NOTE — ROUTINE PROCESS
SHIFT CHANGE NOTE FOR [facility]    Bedside and Verbal shift change report given to Jim Wallace RN (oncoming nurse) by Kymberly De Anda RN   (offgoing nurse). Report included the following information SBAR, Kardex, MAR and Recent Results. Situation:   Code Status: Full Code   Reason for Admission: sepsis  Hospital Day: 1   Problem List:   Hospital Problems  Date Reviewed: 8/8/2017          Codes Class Noted POA    Acute blood loss as cause of postoperative anemia ICD-10-CM: D62  ICD-9-CM: 285.1  7/25/2017 Yes        Aftercare following surgery of the circulatory system ICD-10-CM: W39.147  ICD-9-CM: V58.73  7/25/2017 Yes    Overview Addendum 8/7/2017  2:20 PM by Funmi Titus MD     S/P Removal of infected graft; Repair of left superficial femoral artery; Repair of left common femoral artery (7/25/2017 - Dr. Catherine Vaz)             Impaired mobility and ADLs ICD-10-CM: Z74.09  ICD-9-CM: 799.89  7/24/2017 Yes        Infection of vascular bypass graft ([facility] [address] ICD-10-CM: T82. 7XXA  ICD-9-CM: 996.62  7/24/2017 Yes    Overview Signed 8/7/2017  2:19 PM by Funmi Titus MD     Left lower extremity             * (Principal)Sepsis associated with internal vascular access [facility]) ICD-10-CM: T82. 7XXA, A41.9  ICD-9-CM: 996.62, 038.9  7/24/2017 Yes        Anticoagulated on Coumadin (Chronic) ICD-10-CM: Z51.81, Z79.01  ICD-9-CM: V58.83, V58.61  Unknown Yes        Chronic atrial fibrillation (HCC) (Chronic) ICD-10-CM: I48.2  ICD-9-CM: 427.31  Unknown Yes        Benign hypertensive heart disease with systolic congestive heart failure, NYHA class 2 (HCC) (Chronic) ICD-10-CM: I11.0, I50.20  ICD-9-CM: 402.11, 428.20, 428.0  1/26/2015 Yes              Background:   Past Medical History:   Past Medical History:   Diagnosis Date    Acute blood loss as cause of postoperative anemia 4/4/2017    Anticoagulated on Coumadin     Aortoiliac occlusive disease ([address] 1/25/2017    Atherosclerosis of native artery of both lower extremities with intermittent claudication ([facility] [address] 1/25/2017    Benign hypertensive heart disease with systolic congestive heart failure, NYHA class 2 ([facility] [address] 1/26/2015    Carotid artery disease (HCC)     Chronic anemia     Chronic atrial fibrillation (HCC)     Chronic systolic heart failure (HCC)     Chronic ulcer of right foot ([facility] [address] 4/4/2017    Coronary artery disease involving native coronary artery of native heart 3/15/2017    Successful stenting of Cx (Xience LESLEY) and RCA (Xience LESLEY) to 0% by Dr. Marco Philip on 3/15/17.  DDD (degenerative disc disease), lumbar 1/26/2015    Dyslipidemia     Erectile dysfunction 7/5/2016    Euthyroid sick syndrome 4/6/2017    Hereditary peripheral neuropathy 11/15/2016    History of cardioversion 4/18/2017    S/P Synchronized external cardioversion (4/18/2017 - Dr. Diamond Mejia)    History of vitamin D deficiency 4/22/2017    Vitamin D 25-Hydroxy (4/22/2017) = 12.0; Vitamin D 25-Hydroxy (5/26/2017) = 38.7    Infection of vascular bypass graft ([facility] [address] 7/24/2017    Left lower extremity    Ischemic cardiomyopathy     Peripheral artery disease ([facility] [address] 1/25/2017    Spinal stenosis of lumbar region with radiculopathy 5/4/2015    Dr. Mary Damian      Patient taking anticoagulants yes   Patient has a defibrillator: no     Assessment:   Changes in Assessment throughout shift: no     Patient has central line: yes Reasons if yes: long term antibiotic  Insertion date:8/2/17 Last dressing date:8/2/17   Patient has Jiang Cath: no Reasons if yes:     Insertion date:     Last Vitals:     Vitals:    08/08/17 0553 08/08/17 0655 08/08/17 0838 08/08/17 1241   BP: 159/86 160/81 155/71 160/80   Pulse: 68 67 71 68   Resp:   18    Temp:   99.1 °F (37.3 °C)    SpO2:   97%    Weight:            PAIN    Pain Assessment    Pain Intensity 1: 8 (08/08/17 1200) Pain Intensity 1: 2 (12/29/14 1105)    Pain Location 1: Leg Pain Location 1: Abdomen    Pain Intervention(s) 1: Medication (see MAR) Pain Intervention(s) 1: Medication (see MAR)  Patient Stated Pain Goal: 0 Patient Stated Pain Goal: 0  o Intervention effective: no    o Other actions taken for pain: pain medicine     Skin Assessment  Skin color Skin Color: Appropriate for ethnicity  Condition/Temperature Skin Condition/Temp: Warm  Integrity Skin Integrity: Wound (add Wound LDA)  Turgor Turgor: Non-tenting  Weekly Pressure Ulcer Documentation  Pressure  Injury Documentation: No Pressure Injury Noted-Pressure Ulcer Prevention Initiated  Wound Prevention & Protection Methods  Orientation of wound Orientation of Wound Prevention: Posterior  Location of Prevention Location of Wound Prevention: Sacrum/Coccyx  Dressing Present Dressing Present : No  Dressing Status Dressing Status: Intact  Wound Offloading Wound Offloading (Prevention Methods): Bed, pressure reduction mattress, Chair cushion, Repositioning, Turning, Wheelchair     INTAKE/OUPUT    Date 08/07/17 0700 - 08/08/17 0659 08/08/17 0700 - 08/09/17 0659   Shift [id number] 1809-9219 24 Hour Total 8610-7734 4033-7905 24 Hour Total   I  N  T  A  K  E   P.O. 118  118 400  400      P. O. 118  118 400  400    Shift Total  (mL/kg) 118  (1.6)  118  (1.6) 400  (5.3)  400  (5.3)   O  U  T  P  U  T   Urine  (mL/kg/hr) 900  (1) 850  (0.9) 1750  (1)         Urine Voided          Urine Occurrence(s)  6 x 6 x 1 x  1 x    Stool            Stool Occurrence(s)  2 x 2 x 0 x  0 x    Shift Total  (mL/kg) 900  (12) 850  (11.3) 1750  (23.3)      NET -[phone] 400  400   Weight (kg) 75 75 75 75 75 75       Recommendations:  1. Patient needs and requests: pain medicine    2. Diet: Cardiac    3. Pending tests/procedures: no     4. Functional Level/Equipment: 1 x person assist    5. Estimated Discharge Date: TDB Posted on Whiteboard in Patients Room: no     HEALS Safety Check    A safety check occurred in the patient's room between off going nurse and oncoming nurse listed above.     The safety check included the below items  Area Items   H  High Alert Medications - Verify all high alert medication drips (heparin, PCA, etc.)   E  Equipment - Suction is set up for ALL patients (with cailin)  - Red plugs utilized for all equipment (IV pumps, etc.)  - WOWs wiped down at end of shift.  - Room stocked with oxygen, suction, and other unit-specific supplies   A  Alarms - Bed alarm is set for fall risk patients  - Ensure chair alarm is in place and activated if patient is up in a chair   L  Lines - Check IV for any infiltration  - Jiang bag is empty if patient has a Jiang   - Tubing and IV bags are labeled   S  Safety   - Room is clean, patient is clean, and equipment is clean. - Hallways are clear from equipment besides carts. - Fall bracelet on for fall risk patients  - Ensure room is clear and free of clutter  - Suction is set up for ALL patients (with cailin)  - Hallways are clear from equipment besides carts.    - Isolation precautions followed, supplies available outside room, sign posted

## 2017-08-08 NOTE — CONSULTS
ARU PSYCHOLOGICAL SCREENING    Assessment Initiated:  August 8, 2017    Rehab Diagnosis:  Sepsis    Pertinent Physical/Psychiatric History:     Patient Active Problem List   Diagnosis Code    Benign hypertensive heart disease with systolic congestive heart failure, NYHA class 2 ([facility]) I11.0, I50.20    DDD (degenerative disc disease), lumbar M51.36    Erectile dysfunction N52.9    Spinal stenosis of lumbar region with radiculopathy M48.06, M54.16    Hereditary peripheral neuropathy G60.9    Aortoiliac occlusive disease ([facility] [address] I74.09    Atherosclerosis of native artery of both lower extremities with intermittent claudication ([facility] [address] I70.213    Peripheral artery disease ([facility] [address] I73.9    Coronary artery disease involving native coronary artery of native heart I25.10    Chronic atrial fibrillation ([facility]) I48.2    Acute blood loss as cause of postoperative anemia D62    Impaired mobility and ADLs Z74.09    Anticoagulated on Coumadin Z51.81, Z79.01    Ischemic cardiomyopathy I25.5    Chronic systolic heart failure ([facility]) I50.22    Carotid artery disease ([facility]) I77.9    Hyperammonemia ([facility]) E72.20    Dyslipidemia E78.5    Euthyroid sick syndrome E07.81    Aftercare following surgery of the circulatory system Z48.812    Status post femoral-popliteal bypass surgery Z95.828    History of cardioversion Z98.890    Critical ischemia of lower extremity I99.8    Chronic ulcer of right foot ([facility] [address] L97.519    History of vitamin D deficiency Z86.39    Pseudoaneurysm of femoral artery ([facility]) I72.4    Infection of vascular bypass graft ([facility] [address] T82. 7XXA    Chronic anemia D64.9    Sepsis associated with internal vascular access ([facility] [address] T82. 7XXA, A41.9       Patient denies past nor current psychiatric services. He is Rx Cymbalta on admit to ARU, possibly for pain management.   Patient stopped tobacco use approximately fifteen years ago; he was consuming at least as many as five beers per day, prior to this hospitalization, and admittedly had DT's on admit to hospital; he now reports that he has \"quit\" but obviously has probability of return to consumption on discharge. At this time, he denies any intention. He further reports remote cannabis use. OBJECTIVE  GENERAL OBSERVATIONS  Willingness to participate in program: [x] good   [] fair [] indifferent [] poor    General Appearance:  Patient observed casually dressed, having many tattoos, and sitting upright in wheelchair in room and in no apparent distress. Patient wears very long beard and ponytail. Sensory Impairments:  Patient has satisfactory auditory reception and comprehension and responds to inquiry with intelligibility.     Yarsani Affiliation:  Unknown    Admission Assessment  Discharge Status   [x] alert  [] lethargic  [] difficult to arouse  [] fluctuating  [] other: Level of Consciousness [x] alert  [] lethargic  [] difficult to arouse  [] fluctuating  [] other:   [x] person  [x] place  [x] time  [x] situation Oriented [x] person  [x] place  [x] time  [x] situation   [x] within normal limits  [] impaired       [] mild        [] moderate        [] severe Attention [x] within normal limits  [] impaired       [] mild        [] moderate        [] severe   [x] within normal limits  [] impaired       [] mild        [] moderate        [] severe Memory [x] within normal limits  [x] impaired       [x] mild        [] moderate        [] severe   [x] appropriate to situation  [] depressed  [] anxious  [] angry   [] fearful  [] emotionally labile  [] other:  Mood [x] appropriate to situation  [] depressed  [x] anxious  [] angry   [x] fearful  [] emotionally labile  [] other:   [x] appropriate  [] flat  [] inappropriate to content of speech Affect [x] appropriate  [] flat  [] inappropriate to content of speech   [x] appropriate  [] aggressive/agitated  [] withdrawn  [] inappropriate  [] other: Behavior [x] appropriate  [] aggressive/agitated  [] withdrawn  [] inappropriate  [] other: Significant resting pain observed   [x] good  [] limited  [] denial  [] none Insight Into Illness [x] good  [] limited  [] denial  [] none   [x] intact  [] impaired       [] mild        [] moderate        [] severe       Describe:  Cognition [x] intact  [x] impaired       [x] mild        [] moderate        [] severe       Describe: Mildly distracted with acute LE pain   [x] coping  [] demonstrates poor adjustment  [] undetermined       As evidenced by:  Patient Adjustment to Disability [x] coping  [] demonstrates poor adjustment  [] undetermined       As evidenced by:    [x] coping  [] demonstrates poor adjustment  [] undetermined      As evidenced by: Patient is  many years but very close to his only child, a daughter who is in [facility]. Family Adjustment to Disability [x] coping  [] demonstrates poor adjustment  [] undetermined      As evidenced by:      ASSESSMENT  Clinical Impression:  Patient is a 59year old, maritally  for as many as fifteen years, previous ,  male; he has been residing alone in one level residence in [place] with six steps to enter, but reports that he will relocate to his daughter's residence in [place] which is two story, with full flight to bedrooms on second floor and having ten steps to enter. He apparently has no contact with his  spouse. Until recently, he was employed as a  for [employer] in [place] [facility]. He is familiar with ARU from prior hospitalization and understands the treatment milieu and modalities. He insists that he is motivated to improve as able, but also accepting the possibility of LE amputation as eventual possibility, as apparently already discussed with Dr. Kevin Jones. Emotionally, patient presents in no apparent distress and no lability is present. His mood is calm and composed and he is actually jovial on interview.   He seems realistic in his thinking about himself and his circumstances and is therefore not acknowledging any particular frustration with his medical situation. Patient denies past psychiatric problems. He does admit to recent consumption of at least five beers per day though insists that this has stopped with admission; he reportedly experienced DT's as a result. Patient may be a candidate for referral to AA and will discuss further with him. Patient will be monitored for any possible, emergent emotional and/or behavioral difficulties while on ARU. Cognitively, patient seems to present at his baseline of ability. He is alert and oriented, able to understand and follow direction, and is thinking realistically about his immediate circumstances. Naturally, patient will benefit from cues, prompts and redirections, as necessary, with novel and/or complex direction. Patient Strengths:  Alert, oriented, pleasant, cooperative and motivated to improve. Patient Preferences:  Patient expects to discharge to his daughter's residence in [facility]; he identifies her as his primary support, along with her spouse. Rehab Potential:  Good    Educational Needs: Under each heading list the specific items in which the patient or family will need education/training.  Example: hip precautions, use of walker, ADL equipment, neglect, judgment, adjustment, etc.     Special considerations or accommodations for teaching:  [] Yes     [x] No     [] NA  If Yes, explain:  Discharge Status    Completed Demonstrated/ Verbalized Understanding    Yes No Yes No   Info regarding disability:  [] [] [] []   Adjustment: Possible increased dependency [x] [] [x] []   Cognition:  [] [] [] []   Other: Safety [x] [] [x] []   Other:  [] [] [] []   If education not completed, explain: [] [] [] []     PLAN  Problem: Dependency versus independence  Long Term Goal: Accept forced dependence in recovery  Intervention: Patient education and support   At Discharge - LTG Achieved: [x] Yes [] No If not achieved, explain:    Problem: Safety  Long Term Goal: Maximize safety awareness  Intervention: Patient education and reinforcement  At Discharge - LTG Achieved: [x] Yes [] No If not achieved, explain:    Problem:   Long Term Goal:   Intervention:   At Discharge - LTG Achieved: [] Yes [] No If not achieved, explain:    Problem:   Long Term Goal:   Intervention:   At Discharge - LTG Achieved: [] Yes [] No If not achieved, explain:    Problem:   Long Term Goal:   Intervention:   At Discharge - LTG Achieved: [] Yes [] No If not achieved, explain:    Jayne Medina, [facility]  8/8/2017 11:38 AM    DISCHARGE STATUS    Clinical Impressions: Unfortunately, patient was transferred from ARU to acute care for probable LE amputation and graft removal.  Patient fully cognizant of probability for same and coping with information provided, to the best of his ability. He is not focusing on ramification for himself as acute pain is quite distracting at this time. Patient encouraged to persevere in his continued recovery effort, now probably post amputation. He may be referred back to ARU. Fortunately, he continues to have full support of his daughter.     Follow-up Services Recommended Purpose                 Jayne Medina, PHD  Discharge Date/Time:

## 2017-08-08 NOTE — WOUND CARE
Patient sitting on side of bed performing self bathing. Complaints of lower leg pain, mostly left left. Small blister like area noted to left medial ankle area. Wound care will change vac dressing in am and assess for possible d/c of negative pressure vac machine. Vascular aware of plan and agreeable. Vac dressing patent, 125mmHg.

## 2017-08-08 NOTE — PROGRESS NOTES
[x] Psychology  [] Social Work [] Recreational Therapy    INTERVENTION  UNITS/TIME OF SERVICE   Assessment August 8, 2017   Supportive Counseling    Orientation    Discharge Planning    Resource Linkage              Progress/Current Status    Patient seen for Psychological Evaluation as requested on admission to ARU by Dr. Marilee Montano. Patient is familiar with treatment milieu from prior hospitalization and will therefore have an easier transition into program.  Patient is entirely calm, pleasant and cooperative on contact and exudes a generally easy composure. He does not seem to be at all concerned about his acute medical problems and is willing to work to his level of ability in therapy. Patient is even acknowledging his acceptance of the possibility of an amputation, if recommended by MD.  On discharge, patient expects that he will be with his daughter and her spouse in [place], although there are many steps to enter and bedrooms are up a full flight of stairs. Patient is not receiving any psychiatric services. He will be monitored for mood and behavior stability while on ARU.     Shelbi Vasquez, PHD 8/8/2017 11:35 AM

## 2017-08-09 ENCOUNTER — TELEPHONE (OUTPATIENT)
Dept: VASCULAR SURGERY | Age: 57
End: 2017-08-09

## 2017-08-09 LAB
INR PPP: 2.7 (ref 0.8–1.2)
PROTHROMBIN TIME: 27.4 SEC (ref 11.5–15.2)

## 2017-08-09 PROCEDURE — 77030011256 HC DRSG MEPILEX <16IN NO BORD MOLN -A

## 2017-08-09 PROCEDURE — 65310000000 HC RM PRIVATE REHAB

## 2017-08-09 PROCEDURE — 97530 THERAPEUTIC ACTIVITIES: CPT

## 2017-08-09 PROCEDURE — 77030025414 HC DRSG VAC ASST SMPLC KCON -B

## 2017-08-09 PROCEDURE — 85610 PROTHROMBIN TIME: CPT | Performed by: INTERNAL MEDICINE

## 2017-08-09 PROCEDURE — 97116 GAIT TRAINING THERAPY: CPT

## 2017-08-09 PROCEDURE — 97605 NEG PRS WND THER DME<=50SQCM: CPT

## 2017-08-09 PROCEDURE — 97110 THERAPEUTIC EXERCISES: CPT

## 2017-08-09 PROCEDURE — 74011250637 HC RX REV CODE- 250/637: Performed by: INTERNAL MEDICINE

## 2017-08-09 PROCEDURE — 36592 COLLECT BLOOD FROM PICC: CPT

## 2017-08-09 PROCEDURE — 74011250636 HC RX REV CODE- 250/636: Performed by: INTERNAL MEDICINE

## 2017-08-09 PROCEDURE — 97150 GROUP THERAPEUTIC PROCEDURES: CPT

## 2017-08-09 RX ORDER — WARFARIN 1 MG/1
1 TABLET ORAL ONCE
Status: COMPLETED | OUTPATIENT
Start: 2017-08-09 | End: 2017-08-09

## 2017-08-09 RX ADMIN — VITAMIN D, TAB 1000IU (100/BT) 1000 UNITS: 25 TAB at 09:20

## 2017-08-09 RX ADMIN — DILTIAZEM HYDROCHLORIDE 30 MG: 30 TABLET, FILM COATED ORAL at 21:30

## 2017-08-09 RX ADMIN — OXYCODONE HYDROCHLORIDE 15 MG: 5 TABLET ORAL at 20:38

## 2017-08-09 RX ADMIN — CEFAZOLIN SODIUM 2 G: 2 SOLUTION INTRAVENOUS at 04:25

## 2017-08-09 RX ADMIN — DILTIAZEM HYDROCHLORIDE 30 MG: 30 TABLET, FILM COATED ORAL at 15:51

## 2017-08-09 RX ADMIN — NAPROXEN 375 MG: 250 TABLET ORAL at 08:32

## 2017-08-09 RX ADMIN — CYANOCOBALAMIN TAB 1000 MCG 1000 MCG: 1000 TAB at 09:20

## 2017-08-09 RX ADMIN — DOCUSATE SODIUM 100 MG: 100 CAPSULE, LIQUID FILLED ORAL at 09:19

## 2017-08-09 RX ADMIN — DULOXETINE HYDROCHLORIDE 60 MG: 30 CAPSULE, DELAYED RELEASE ORAL at 09:22

## 2017-08-09 RX ADMIN — ASPIRIN 81 MG 81 MG: 81 TABLET ORAL at 08:31

## 2017-08-09 RX ADMIN — COLLAGENASE SANTYL 1 EACH: 250 OINTMENT TOPICAL at 09:59

## 2017-08-09 RX ADMIN — Medication 250 MG: at 08:31

## 2017-08-09 RX ADMIN — DOCUSATE SODIUM 100 MG: 100 CAPSULE, LIQUID FILLED ORAL at 17:31

## 2017-08-09 RX ADMIN — POLYETHYLENE GLYCOL 3350 17 G: 17 POWDER, FOR SOLUTION ORAL at 18:23

## 2017-08-09 RX ADMIN — OXYCODONE HYDROCHLORIDE 20 MG: 5 TABLET ORAL at 08:33

## 2017-08-09 RX ADMIN — CEFAZOLIN SODIUM 2 G: 2 SOLUTION INTRAVENOUS at 20:30

## 2017-08-09 RX ADMIN — SODIUM CHLORIDE TAB 1 GM 1 G: 1 TAB at 08:34

## 2017-08-09 RX ADMIN — GABAPENTIN 300 MG: 300 CAPSULE ORAL at 06:17

## 2017-08-09 RX ADMIN — METOPROLOL TARTRATE 12.5 MG: 25 TABLET ORAL at 09:21

## 2017-08-09 RX ADMIN — Medication 1 CAPSULE: at 10:22

## 2017-08-09 RX ADMIN — OXYCODONE HYDROCHLORIDE 20 MG: 5 TABLET ORAL at 12:22

## 2017-08-09 RX ADMIN — CEFAZOLIN SODIUM 2 G: 2 SOLUTION INTRAVENOUS at 12:15

## 2017-08-09 RX ADMIN — METOPROLOL TARTRATE 12.5 MG: 25 TABLET ORAL at 20:39

## 2017-08-09 RX ADMIN — AMIODARONE HYDROCHLORIDE 200 MG: 200 TABLET ORAL at 09:20

## 2017-08-09 RX ADMIN — NAPROXEN 375 MG: 250 TABLET ORAL at 17:29

## 2017-08-09 RX ADMIN — ATORVASTATIN CALCIUM 40 MG: 40 TABLET, FILM COATED ORAL at 20:30

## 2017-08-09 RX ADMIN — ZINC SULFATE 220 MG (50 MG) CAPSULE 220 MG: CAPSULE at 09:30

## 2017-08-09 RX ADMIN — DILTIAZEM HYDROCHLORIDE 30 MG: 30 TABLET, FILM COATED ORAL at 08:34

## 2017-08-09 RX ADMIN — OXYCODONE HYDROCHLORIDE 20 MG: 5 TABLET ORAL at 04:24

## 2017-08-09 RX ADMIN — OXYCODONE HYDROCHLORIDE 20 MG: 5 TABLET ORAL at 15:48

## 2017-08-09 RX ADMIN — GABAPENTIN 300 MG: 300 CAPSULE ORAL at 21:30

## 2017-08-09 RX ADMIN — GABAPENTIN 300 MG: 300 CAPSULE ORAL at 14:22

## 2017-08-09 RX ADMIN — WARFARIN SODIUM 1 MG: 1 TABLET ORAL at 17:28

## 2017-08-09 RX ADMIN — SODIUM CHLORIDE TAB 1 GM 1 G: 1 TAB at 17:28

## 2017-08-09 RX ADMIN — Medication 400 MG: at 09:19

## 2017-08-09 RX ADMIN — POTASSIUM CHLORIDE 20 MEQ: 20 TABLET, EXTENDED RELEASE ORAL at 17:35

## 2017-08-09 RX ADMIN — POTASSIUM CHLORIDE 20 MEQ: 20 TABLET, EXTENDED RELEASE ORAL at 09:19

## 2017-08-09 RX ADMIN — DILTIAZEM HYDROCHLORIDE 30 MG: 30 TABLET, FILM COATED ORAL at 12:23

## 2017-08-09 RX ADMIN — OXYCODONE HYDROCHLORIDE 20 MG: 5 TABLET ORAL at 00:02

## 2017-08-09 NOTE — ROUTINE PROCESS
SHIFT CHANGE NOTE FOR [facility]    Bedside and Verbal shift change report given to Darby Bradley RN (oncoming nurse) by Priya Carolina RN   (offgoing nurse). Report included the following information SBAR, Kardex, MAR and Recent Results. Situation:   Code Status: Full Code   Reason for Admission: sepsis  Hospital Day: 2   Problem List:   Hospital Problems  Date Reviewed: 8/9/2017          Codes Class Noted POA    Chronic ulcer of right heel ([facility] [address] (Chronic) ICD-10-CM: L97.419  ICD-9-CM: 707.14  8/8/2017 Yes        Acute blood loss as cause of postoperative anemia ICD-10-CM: D62  ICD-9-CM: 285.1  7/25/2017 Yes        Aftercare following surgery of the circulatory system ICD-10-CM: Z48.812  ICD-9-CM: V58.73  7/25/2017 Yes    Overview Addendum 8/7/2017  2:20 PM by Darvin Osler, MD     S/P Removal of infected graft; Repair of left superficial femoral artery; Repair of left common femoral artery (7/25/2017 - Dr. Janel Mckeon)             Impaired mobility and ADLs ICD-10-CM: Z74.09  ICD-9-CM: 799.89  7/24/2017 Yes        Infection of vascular bypass graft ([facility] [address] ICD-10-CM: T82. 7XXA  ICD-9-CM: 996.62  7/24/2017 Yes    Overview Signed 8/7/2017  2:19 PM by Darvin Osler, MD     Left lower extremity             * (Principal)Sepsis associated with internal vascular access [facility]) ICD-10-CM: T82. 7XXA, A41.9  ICD-9-CM: 996.62, 038.9  7/24/2017 Yes        Anticoagulated on Coumadin (Chronic) ICD-10-CM: Z51.81, Z79.01  ICD-9-CM: V58.83, V58.61  Unknown Yes        Chronic atrial fibrillation ([facility]) (Chronic) ICD-10-CM: I48.2  ICD-9-CM: 427.31  Unknown Yes        Benign hypertensive heart disease with systolic congestive heart failure, NYHA class 2 ([facility]) (Chronic) ICD-10-CM: I11.0, I50.20  ICD-9-CM: 402.11, 428.20, 428.0  1/26/2015 Yes              Background:   Past Medical History:   Past Medical History:   Diagnosis Date    Acute blood loss as cause of postoperative anemia 4/4/2017    Anticoagulated on Coumadin     Aortoiliac occlusive disease ([facility] [address] 1/25/2017    Atherosclerosis of native artery of both lower extremities with intermittent claudication ([facility] [address] 1/25/2017    Benign hypertensive heart disease with systolic congestive heart failure, NYHA class 2 ([facility] [address] 1/26/2015    Carotid artery disease (HCC)     Chronic anemia     Chronic atrial fibrillation (HCC)     Chronic systolic heart failure (HCC)     Chronic ulcer of right foot ([facility] [address] 4/4/2017    Chronic ulcer of right heel ([facility] [address] 8/8/2017    Coronary artery disease involving native coronary artery of native heart 3/15/2017    Successful stenting of Cx (Xience LESLEY) and RCA (Xience LESLEY) to 0% by Dr. Christian Rivas on 3/15/17.  DDD (degenerative disc disease), lumbar 1/26/2015    Dyslipidemia     Erectile dysfunction 7/5/2016    Euthyroid sick syndrome 4/6/2017    Hereditary peripheral neuropathy 11/15/2016    History of cardioversion 4/18/2017    S/P Synchronized external cardioversion (4/18/2017 - Dr. Cary Bass)    History of vitamin D deficiency 4/22/2017    Vitamin D 25-Hydroxy (4/22/2017) = 12.0; Vitamin D 25-Hydroxy (5/26/2017) = 38.7    Infection of vascular bypass graft ([facility] [address] 7/24/2017    Left lower extremity    Ischemic cardiomyopathy     Peripheral artery disease ([facility] [address] 1/25/2017    Spinal stenosis of lumbar region with radiculopathy 5/4/2015    Dr. Desirae Bojorquez      Patient taking anticoagulants yes   Patient has a defibrillator: no     Assessment:   Changes in Assessment throughout shift: no     Patient has central line: yes Reasons if yes: long term antibiotic  Insertion date:8/2/17 Last dressing date:8/2/17   Patient has Jiang Cath: no Reasons if yes:     Insertion date:     Last Vitals:     Vitals:    08/08/17 2200 08/09/17 0617 08/09/17 0756 08/09/17 1223   BP: 126/70 118/70 127/66 121/59   Pulse: 66 62 62 (!) 59   Resp:   17    Temp:   96 °F (35.6 °C)    SpO2:   99%    Weight:            PAIN    Pain Assessment    Pain Intensity 1: 7 (08/09/17 1200) Pain Intensity 1: 2 (12/29/14 1105)    Pain Location 1: Groin Pain Location 1: Abdomen    Pain Intervention(s) 1: Medication (see MAR) Pain Intervention(s) 1: Medication (see MAR)  Patient Stated Pain Goal: 0 Patient Stated Pain Goal: 0  o Intervention effective: no    o Other actions taken for pain: pain medicine     Skin Assessment  Skin color Skin Color: Appropriate for ethnicity  Condition/Temperature Skin Condition/Temp: Warm, Dry  Integrity Skin Integrity: Wound (add Wound LDA)  Turgor Turgor: Non-tenting  Weekly Pressure Ulcer Documentation  Pressure  Injury Documentation: Pressure Injury Noted-See Wound LDA to Document  Wound Prevention & Protection Methods  Orientation of wound Orientation of Wound Prevention: Posterior, Left (Left outer heel)  Location of Prevention Location of Wound Prevention: Sacrum/Coccyx, Heel  Dressing Present Dressing Present : Yes, Changed  Dressing Status Dressing Status: Intact  Wound Offloading Wound Offloading (Prevention Methods): Bed, pressure reduction mattress, Chair cushion, Elevate heels, Foam silicone, Heel boots, Repositioning, Pillows, Specialty boot/shoe, Turning, Wheelchair     INTAKE/OUPUT    Date 08/08/17 0700 - 08/09/17 0659 08/09/17 0700 - 08/10/17 0659   Shift 9049-1992 0254-2068 24 Hour Total 0962-1820 6764-6254 24 Hour Total   I  N  T  A  K  E   P. O. 580  580 420  420      P. O. 580  580 420  420    I.V.  (mL/kg/hr) 50  (0.1) 50  (0.1) 100  (0.1)         Volume (ceFAZolin (ANCEF) 2g IVPB in 50 mL D5W) 50 50 100       Shift Total  (mL/kg) 630  (8.4) 50  (0.7) 680  (9.1) 420  (5.6)  420  (5.6)   O  U  T  P  U  T   Urine  (mL/kg/hr) 400  (0.4) 1600  (1.8) 2000  (1.1) 450  450      Urine Voided 400 1600 2000 450  450      Urine Occurrence(s) 3 x 2 x 5 x 2 x  2 x    Emesis/NG output  0 0         Emesis  0 0       Stool  0 0         Stool Occurrence(s) 1 x 0 x 1 x 0 x  0 x      Stool  0 0       Shift Total  (mL/kg) 400  (5.3) 1600  (21.3) 2000  (26.7) 450  (6)  450  (6)    -1550 -1320 -30  -30   Weight (kg) 75 75 75 75 75 75       Recommendations:  1. Patient needs and requests: pain medicine    2. Diet: Cardiac    3. Pending tests/procedures: no     4. Functional Level/Equipment: 1 x person assist    5. Estimated Discharge Date: TDB Posted on Whiteboard in Patients Room: [facility] Safety Check    A safety check occurred in the patient's room between off going nurse and oncoming nurse listed above. The safety check included the below items  Area Items   H  High Alert Medications - Verify all high alert medication drips (heparin, PCA, etc.)   E  Equipment - Suction is set up for ALL patients (with cailin)  - Red plugs utilized for all equipment (IV pumps, etc.)  - WOWs wiped down at end of shift.  - Room stocked with oxygen, suction, and other unit-specific supplies   A  Alarms - Bed alarm is set for fall risk patients  - Ensure chair alarm is in place and activated if patient is up in a chair   L  Lines - Check IV for any infiltration  - Jiang bag is empty if patient has a Jiang   - Tubing and IV bags are labeled   S  Safety   - Room is clean, patient is clean, and equipment is clean. - Hallways are clear from equipment besides carts. - Fall bracelet on for fall risk patients  - Ensure room is clear and free of clutter  - Suction is set up for ALL patients (with cailin)  - Hallways are clear from equipment besides carts.    - Isolation precautions followed, supplies available outside room, sign posted

## 2017-08-09 NOTE — INTERDISCIPLINARY ROUNDS
[facility] PHYSICAL REHABILITATION  [address] [place] [address], [address]    INPATIENT REHABILITATION  TEAM CONFERENCE SUMMARY     Date of Conference: 8/9/2017    Name: Marcella Palmer Age / Sex: 59 y.o. / male   CSN: [id number] MRN: [id number]   [facility] Date: 8/7/2017 Length of Stay: 2 day     Primary Rehabilitation Diagnosis  1. Impaired Mobility and ADLs  2. S/P Removal of infected graft; Repair of left superficial femoral artery; Repair of left common femoral artery (7/25/2017 - Dr. Courtney Alvarez);  History of sepsis associated with infection of vascular bypass graft     Comorbidities   Benign hypertensive heart disease with systolic congestive heart failure, NYHA class 2  I11.0, I50.20    DDD (degenerative disc disease), lumbar M51.36    Erectile dysfunction N52.9    Spinal stenosis of lumbar region with radiculopathy M48.06, M54.16    Hereditary peripheral neuropathy G60.9    Aortoiliac occlusive disease  I74.09    Atherosclerosis of native artery of both lower extremities with intermittent claudication  I70.213    Peripheral artery disease  I73.9    Coronary artery disease involving native coronary artery of native heart I25.10    Chronic atrial fibrillation  I48.2    Acute blood loss as cause of postoperative anemia D62    Impaired mobility and ADLs Z74.09    Anticoagulated on Coumadin Z51.81, Z79.01    Ischemic cardiomyopathy I25.5    Chronic systolic heart failure  H39.44    Carotid artery disease  I77.9    Hyperammonemia  E72.20    Dyslipidemia E78.5    Euthyroid sick syndrome E07.81    Aftercare following surgery of the circulatory system Z48.812    Status post femoral-popliteal bypass surgery Z95.828    History of cardioversion Z98.890    Critical ischemia of lower extremity I99.8    Chronic ulcer of right foot  L97.519    History of vitamin D deficiency Z86.39    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    Chronic anemia D64.9    Chronic ulcer of right heel L97.419          Therapy:     FIM SCORES Initial Assessment Weekly Progress Assessment 8/9/2017   Eating Functional Level: 6  6 - Mod I   Swallowing     Grooming    5 - Setup   Bathing 4  4 - Min A      Upper Body Dressing Functional Level: 5  Items Applied/Steps Completed: Pullover (4 steps)  Comments: Setup only  5 - Setup   Lower Body Dressing Functional Level: 3  Items Applied/Steps Completed: Elastic waist pants (3 steps), Sock, left (1 step), Sock, right (1 step)  Comments: Pt able to jasmeet socks and thread LEs into shorts while seated on EOB with slight assistance for management of wound vac lines. Pt requires Min-Mod A for standing balance while standing with RW and additional assistance for managing clothing over buttocks due to pt requiring constant BUE support on the walker in standing. 3 - Mod A   Toileting Functional Level: 2  Comments: Based on performance of LB bathing and dressing, pt would require maximal assistance for clothing management and thorough hygiene with Min-Mod A for static standing balance while assistance is provided for clothing. 2 - Max A   Bladder - level of assist 6     Bladder - accident frequency score 6     Bowel - level of assist 2     Bowel - accident frequency score 6     Toilet Transfer [place] Toilet Transfer Score: 3  Comments: Based on bed to w/c transfer, pt would require Mod A to perform stand step transfer to/from [facility] utilizing RW. Mod A required due to impaired balance, posterior lean, decreased strength, and inability to weightbear on LLE. 3 - Mod A   Tub/Shower Transfer [place] Tub/Shower Transfer Score: 3  Comments: Based on bed to w/c transfer, pt would require Mod A for balance to perform stand step transfer using RW due to impaired balance, posterior lean, decreased strength, and inability to weightbear on LLE this session.   3 - Mod A      Comprehension Primary Mode of Comprehension: Auditory  Score: 6        Expression Primary Mode of Expression: Verbal  Score: 6        Social Interaction Score: 5     Problem Solving Score: 5     Memory Score: 5       FIM SCORES Initial Assessment Weekly Progress Assessment 8/9/2017   Bed/Chair/Wheelchair Transfers Other: stand step with RW  Transfer Assistance : 3 (Moderate assistance ) (min-mod assist secondary to L LE pain)  Sit to Stand Assistance:  Moderate assistance (min-mod assist secondary to L LE pain)  Car Transfers: Not tested  Car Type: N/A Sit to Stand Assistance: Minimal assistance   Bed Mobility Rolling Right 5 (Supervision)   Rolling Left 5 (Supervision)   Supine to Sit 5 (Supervision)   Sit to Stand Moderate assistance (min-mod assist secondary to L LE pain)   Sit to Supine 5 (Supervision)    Rolling Right       Rolling Left       Supine to Sit       Sit to Stand   Minimal assistance   Sit to Supine          Locomotion (W/C) Able to Propel (ft): 232 feet  Functional Level: 5  Curbs/Ramps Assist Required (FIM Score): 0 (Not tested)  Wheelchair Setup Assist Required : 5 (Supervision/setup)  Wheelchair Management: Manages left brake, Manages right brake Function    Setup Assistance         Locomotion (W/C distance) 232 Feet     Locomotion (Walk) 4 (Minimal assistance) 4 (Minimal assistance)  Walker, rolling   Locomotion (Walk dist.) 12 Feet (ft) (x 2 repetitions) 80 Feet (ft) (x 2 repetitions)   Steps/Stairs Steps/Stairs Ambulated (#): 0  Level of Assist : 0 (Not tested)  Rail Use: None   Steps/Stairs Ambulated (#): 0  Level of Assist : 0 (Not tested)  Rail Use: None         Nursing:     Neuro:   A&O x__4__                   Respiratory:   [x] WNL   [] O2   [] LPM ______   Other:  Peripheral Vascular:   [] TEDS present   [] Edema present ____ Grade   Cardiac:   [x] WNL   [] Other  Genitourinary:   [x] continent   [] incontinent   [] lopez  Abdominal _______ LBM  GI: __Cardia_____ Diet ___thin___ Liquids _____ tube feeds  Musculoskeletal: ____ ROM Transfers ___W/C__ Assistive Device Used  ___Min_ Level of Assistance  Skin Integumentary:   [] Intact   [x] Not Intact   __________Preventative Measures  Details___________________wound vac changed by wound nurses_________________________________________  Pain: [] Controlled   [x] Not Controlled   Pain Meds:   [x] Scheduled   [x] PRN        Registered Dietitian / Nutrition:     Patient Vitals for the past 100 hrs:   % Diet Eaten   08/09/17 0930 75 %   08/08/17 1758 100 %   08/08/17 1312 85 %   08/08/17 0910 100 %   08/07/17 1800 25 %       Supplements:          [] Yes   [] No      Amount of supplement consumed:        Intake/Output Summary (Last 24 hours) at 08/09/17 1248  Last data filed at 08/09/17 0930   Gross per 24 hour   Intake              630 ml   Output             1600 ml   Net             -970 ml                              Last bowel movement:         Interdisciplinary Team Goals:     1. Goal  Encourage pt to perform sit <> stand transfers with CGA while maintaining equal B LE weight bearing. Barrier  L LE pain, Impaired strength, Impaired balance, Impaired transfers    Intervention  Education, Strength and Balance training, Transfer training     2. Goal  Encourage pt to stand during ADLs with CGA. Barrier  LLE pain, Impaired strength, Impaired balance    Intervention  ADL training, Therapeutic activity, Therapeutic exercise     3. Goal      Barrier      Intervention       4. Goal  Pt pain levels will be 4 or below    Barrier  Pt has uncontrolled pain    Intervention  Scheduled pain meds and prn pain meds, nursing rounds, encourage pt to verbalize pain med needs     5. Goal      Barrier      Intervention       6. Goal      Barrier      Intervention         Disposition / Discharge Planning:      Follow-up therapy services:  tbd   DME recommendations:  tbd    Estimated discharge date:  tbd   Discharge Location:  home         Interdisciplinary team rounds were held this AM with the following team members:       Name   Physical Therapist    Kevin Smith PT, DPT   Occupational Therapist    Najma Giang 79.   Speech Therapist       Recreational Therapist       Nursing    Mary Carmen Salazar, RN, BSN   Dietitian       Clinical Psychologist    Farhana Mcmahon, PhD   Physician    Pearlean Felty, MD        [place], [facility] [place]        Signed:  Pearlean Felty, MD    August 9, 2017

## 2017-08-09 NOTE — ROUTINE PROCESS
SHIFT CHANGE NOTE FOR [facility]    Bedside and Verbal shift change report given to Braydon Junior RN (oncoming nurse) by Deven Sun RN   (offgoing nurse). Report included the following information SBAR, Kardex, MAR and Recent Results. Situation:   Code Status: Full Code   Reason for Admission: sepsis  Hospital Day: 2   Problem List:   Hospital Problems  Date Reviewed: 8/8/2017          Codes Class Noted POA    Chronic ulcer of right heel ([facility] [address] (Chronic) ICD-10-CM: L97.419  ICD-9-CM: 707.14  8/8/2017 Yes        Acute blood loss as cause of postoperative anemia ICD-10-CM: D62  ICD-9-CM: 285.1  7/25/2017 Yes        Aftercare following surgery of the circulatory system ICD-10-CM: Z48.812  ICD-9-CM: V58.73  7/25/2017 Yes    Overview Addendum 8/7/2017  2:20 PM by Chao New MD     S/P Removal of infected graft; Repair of left superficial femoral artery; Repair of left common femoral artery (7/25/2017 - Dr. Purcell Postal)             Impaired mobility and ADLs ICD-10-CM: Z74.09  ICD-9-CM: 799.89  7/24/2017 Yes        Infection of vascular bypass graft ([facility] [address] ICD-10-CM: T82. 7XXA  ICD-9-CM: 996.62  7/24/2017 Yes    Overview Signed 8/7/2017  2:19 PM by Chao New MD     Left lower extremity             * (Principal)Sepsis associated with internal vascular access [facility]) ICD-10-CM: T82. 7XXA, A41.9  ICD-9-CM: 996.62, 038.9  7/24/2017 Yes        Anticoagulated on Coumadin (Chronic) ICD-10-CM: Z51.81, Z79.01  ICD-9-CM: V58.83, V58.61  Unknown Yes        Chronic atrial fibrillation (HCC) (Chronic) ICD-10-CM: I48.2  ICD-9-CM: 427.31  Unknown Yes        Benign hypertensive heart disease with systolic congestive heart failure, NYHA class 2 (HCC) (Chronic) ICD-10-CM: I11.0, I50.20  ICD-9-CM: 402.11, 428.20, 428.0  1/26/2015 Yes              Background:   Past Medical History:   Past Medical History:   Diagnosis Date    Acute blood loss as cause of postoperative anemia 4/4/2017    Anticoagulated on Coumadin     Aortoiliac occlusive disease ([facility] [address] 1/25/2017    Atherosclerosis of native artery of both lower extremities with intermittent claudication ([address] 1/25/2017    Benign hypertensive heart disease with systolic congestive heart failure, NYHA class 2 ([address] 1/26/2015    Carotid artery disease (HCC)     Chronic anemia     Chronic atrial fibrillation (HCC)     Chronic systolic heart failure (HCC)     Chronic ulcer of right foot ([address] 4/4/2017    Chronic ulcer of right heel ([address] 8/8/2017    Coronary artery disease involving native coronary artery of native heart 3/15/2017    Successful stenting of Cx (Xience LESLEY) and RCA (Xience LESLEY) to 0% by Dr. Mynor Oconnor on 3/15/17.  DDD (degenerative disc disease), lumbar 1/26/2015    Dyslipidemia     Erectile dysfunction 7/5/2016    Euthyroid sick syndrome 4/6/2017    Hereditary peripheral neuropathy 11/15/2016    History of cardioversion 4/18/2017    S/P Synchronized external cardioversion (4/18/2017 - Dr. Andrew Sahu)    History of vitamin D deficiency 4/22/2017    Vitamin D 25-Hydroxy (4/22/2017) = 12.0; Vitamin D 25-Hydroxy (5/26/2017) = 38.7    Infection of vascular bypass graft ([facility] [address] 7/24/2017    Left lower extremity    Ischemic cardiomyopathy     Peripheral artery disease ([facility] [address] 1/25/2017    Spinal stenosis of lumbar region with radiculopathy 5/4/2015    Dr. Darian Sumner      Patient taking anticoagulants yes   Patient has a defibrillator: no     Assessment:   Changes in Assessment throughout shift: no     Patient has central line: yes Reasons if yes: long term antibiotic  Insertion date:8/2/17 Last dressing date:8/2/17   Patient has Jiang Cath: no Reasons if yes:     Insertion date:     Last Vitals:     Vitals:    08/08/17 1600 08/08/17 2000 08/08/17 2200 08/09/17 0617   BP: 153/75 139/75 126/70 118/70   Pulse: 66 62 66 62   Resp: 18 17     Temp: 96.9 °F (36.1 °C) 98.3 °F (36.8 °C)     SpO2: 97% 97%     Weight:            PAIN    Pain Assessment    Pain Intensity 1: 0 (08/09/17 0500) Pain Intensity 1: 2 (12/29/14 1105)    Pain Location 1: Groin Pain Location 1: Abdomen    Pain Intervention(s) 1: Medication (see MAR) Pain Intervention(s) 1: Medication (see MAR)  Patient Stated Pain Goal: 0 Patient Stated Pain Goal: 0  o Intervention effective: no    o Other actions taken for pain: pain medicine     Skin Assessment  Skin color Skin Color: Appropriate for ethnicity  Condition/Temperature Skin Condition/Temp: Dry, Warm  Integrity Skin Integrity: Incision (comment)  Turgor Turgor: Non-tenting  Weekly Pressure Ulcer Documentation  Pressure  Injury Documentation: No Pressure Injury Noted-Pressure Ulcer Prevention Initiated  Wound Prevention & Protection Methods  Orientation of wound Orientation of Wound Prevention: Posterior  Location of Prevention Location of Wound Prevention: Buttocks, Sacrum/Coccyx  Dressing Present Dressing Present : No  Dressing Status Dressing Status: Intact  Wound Offloading Wound Offloading (Prevention Methods): Bed, pressure redistribution/air, Wheelchair     INTAKE/OUPUT    Date 08/08/17 0700 - 08/09/17 0659 08/09/17 0700 - 08/10/17 0659   Shift 5642-9597 4451-8175 24 Hour Total 0878-6564 4959-0244 24 Hour Total   I  N  T  A  K  E   P. O. 580  580         P. O. 580  580       I.V.  (mL/kg/hr) 50  (0.1) 50 100         Volume (ceFAZolin (ANCEF) 2g IVPB in 50 mL D5W) 50 50 100       Shift Total  (mL/kg) 630  (8.4) 50  (0.7) 680  (9.1)      O  U  T  P  U  T   Urine  (mL/kg/hr) 400  (0.4) 1600 2000         Urine Voided 400 1600 2000         Urine Occurrence(s) 3 x 2 x 5 x       Emesis/NG output  0 0         Emesis  0 0       Stool  0 0         Stool Occurrence(s) 1 x 0 x 1 x         Stool  0 0       Shift Total  (mL/kg) 400  (5.3) 1600  (21.3) 2000  (26.7)       -1550 -1320      Weight (kg) 75 75 75 75 75 75       Recommendations:  1. Patient needs and requests: pain medicine    2. Diet: Cardiac    3.  Pending tests/procedures: no 4. Functional Level/Equipment: 1 x person assist    5. Estimated Discharge Date: TDB Posted on Whiteboard in Patients Room: no     HEALS Safety Check    A safety check occurred in the patient's room between off going nurse and oncoming nurse listed above. The safety check included the below items  Area Items   H  High Alert Medications - Verify all high alert medication drips (heparin, PCA, etc.)   E  Equipment - Suction is set up for ALL patients (with cailin)  - Red plugs utilized for all equipment (IV pumps, etc.)  - WOWs wiped down at end of shift.  - Room stocked with oxygen, suction, and other unit-specific supplies   A  Alarms - Bed alarm is set for fall risk patients  - Ensure chair alarm is in place and activated if patient is up in a chair   L  Lines - Check IV for any infiltration  - Jiang bag is empty if patient has a Jiang   - Tubing and IV bags are labeled   S  Safety   - Room is clean, patient is clean, and equipment is clean. - Hallways are clear from equipment besides carts. - Fall bracelet on for fall risk patients  - Ensure room is clear and free of clutter  - Suction is set up for ALL patients (with cailin)  - Hallways are clear from equipment besides carts.    - Isolation precautions followed, supplies available outside room, sign posted

## 2017-08-09 NOTE — PROGRESS NOTES
Problem: Mobility Impaired (Adult and Pediatric)  Goal: *Acute Goals and Plan of Care (Insert Text)  Physical Therapy Goals  Short Term Goals  Initiated 8/8/2017 and to be accomplished within 5-7 day(s) (8/15/2017)  1. Patient will move from supine to sit and sit to supine , scoot up and down and roll side to side in bed with modified independence. 2. Patient will transfer from bed to chair and chair to bed with contact guard assist using the least restrictive device. 3. Patient will perform sit to stand with contact guard assist.  4. Patient will ambulate with contact guard assist for 150 feet with the least restrictive device. 5. Patient will ascend/descend 4-6 stairs with B handrail(s) with minimal assistance/contact guard assist.    Long Term Goals  Initiated 8/8/2017 and to be accomplished within 10-14 day(s) (8/22/2017)  1. Patient will move from supine to sit and sit to supine , scoot up and down and roll side to side in bed with independence. 2. Patient will transfer from bed to chair and chair to bed with modified independence using the least restrictive device. 3. Patient will perform sit to stand with modified independence. 4. Patient will ambulate with modified independence for 300 feet with the least restrictive device. 5. Patient will ascend/descend 12 stairs with B handrail(s) with modified independence. PHYSICAL THERAPY DAILY NOTE  Patient Name:Mj Aguilar  Time In: 0840  Time Out: 2448  Patient Seen For: Balance activities;Gait training;Patient education;Transfer training   Pt scheduled for treatment at 0800 but was receiving nursing care for wound vac dressing change and then was receiving medication and therefor missed 3 units of scheduled intervention in the AM which was later recovered during his PM treatment session.   Time In: 1400  Time Out: 1530  Patient Seen For: Balance activities;Gait training;Patient education;Transfer training  Diagnosis: Sepsis  Sepsis associated with internal vascular access, subsequent encounter ([facility] [address] Sepsis associated with internal vascular access [facility])  Precautions: Fall Risk Precautions, Wound Vac to L groin     Subjective: Pt reports an overall slight decrease in L calf pain but as he participated in standing/weight bearing and ambulation, pt reports progressive increase in pain and reports, \"numbness,\" in B feet/LEs. During PM treatment session, pt appears pleasant and agreeable to treatment with pt noting that upon d/c his daughter and his daughter's boyfriend would be available to assist him as needed with the exception of possibly one to two hours in the afternoon when he would be on his own. AM:  Pain at start of tx: 4-5/10 L calf/shin seated at rest  Pain at stop of tx: 6-7/10 L calf/shin in standing - intermittently 9/10 in stance  PM:  Pain at start of tx: 5/10 L calf/shin   Pain at stop of tx[de-identified] 5/10 L calf/shin - with pt reporting intermittent \"sharp,\" L shin pain    Patient identified with name and : yes         Objective:         TRANSFERS Daily Assessment     Sit to Stand Assistance: Minimal assistance   Pt requires CGA to min assist with sit to stand with min manual cues for ant weight shift and intermittently adjusting foot positioning secondary to decreased L LE weight bearing tolerance. Pt requires CGA for safe, slow and controlled descent with stand to sit with pt requiring mod verbal cues to attempt to to decrease compensatory placement of L LE secondary to decreased weight bearing tolerance. During PM treatment session, pt performed 2 sets of 5 repetitions of sit <> stand (w/c <> RW) with mod verbal cues to avoid utilizing momentum and min manual cues and assistance for ant weight shift with sit to stand while maintaining equal B LE weight bearing and CGA for safety with transfer from stand to sit with intermittent verbal cues for B LE positioning to maintain equal weight bearing and to avoid holding his breath.            GAIT Daily Assessment     Amount of Assistance: 4 (Minimal assistance)  Distance (ft): 80 Feet (ft) (x 2 repetitions)  Assistive Device: Walker, rolling   Pt ambulates with partial step through pattern with CGA to min assist for balance and safe RW management. PM Session:  Amount of Assistance: 4 (contact guard assistance)  Distance (ft): 100 Feet (ft) (x 2 repetitions)  Assistive Device: Walker, rolling  Pt ambulates with step through pattern with decreased L stance time requiring CGA for safety. BALANCE Daily Assessment     Sitting - Static: Good (unsupported)  Sitting - Dynamic: Good (unsupported)  Standing - Static: Fair  Standing - Dynamic : Impaired   Pt participated in standing group to address balance and functional strengthening with pt standing for one trial of 9 minutes 33 seconds and one trial of 8 minutes 33 seconds. Pt requires close supervision to CGA with static standing with B UE support on RW, CGA for balance with single UE support on RW and CGA to minimal assistance for balance with bimanual tasks in standing. LOWER EXTREMITY EXERCISES Daily Assessment     Seated LE Therapeutic Exercises:  2 Sets of 15 Repetitions:  Red Theraband Resisted Hip Abd/ER              Assessment: Pt is progressing with functional mobility requiring decreased physical assistance. However, pt continues to have increased L calf pain limiting L LE weight bearing tolerance. During interdisciplinary meeting, unit physician was present to hear barriers of L LE pain/discomfort and the effect on pt's progress. Plan of Care: Continue with current POC. Assess stair negotiation next treatment session.      Marni Garsia, PT, DPT  8/9/2017

## 2017-08-09 NOTE — TELEPHONE ENCOUNTER
pts daughter called this afternoon. Requesting to speak with Kay Balderrama directly in regards to her father. She states that it has been a few days since she has spoken with samir and would like an update on her father. I let her know that she is seeing pts this afternoon and would give her a call when she got a chance. Daughter is aware and has no further questions.     She can be reached at [phone]

## 2017-08-09 NOTE — PROGRESS NOTES
Problem: Self Care Deficits Care Plan (Adult)  Goal: *Acute Goals and Plan of Care (Insert Text)  Long Term Goals (to be met upon discharge date) in order to increase pts functional independence and safety, and decrease burden of care:  1. Pt will perform grooming with independence. 2. Pt will perform UB bathing with modified independence. 3. Pt will perform LB bathing with modified independence. 4. Pt will perform tub/shower transfer with modified independence. 5. Pt will perform UB dressing with independence. 6. Pt will perform LB dressing with modified independence. 7. Pt will perform toileting task with modified independence. 8. Pt will perform toilet transfer with modified independence. Short Term Weekly Goals for (2017 to 8/15/2017) in order to increase pts functional independence and safety, and decrease burden of care:   1. Pt will perform grooming with independence. 2. Pt will perform UB bathing with modified independence. 3. Pt will perform LB bathing with supervision. 4. Pt will perform tub/shower transfer with Min A.  5. Pt will perform UB dressing with independence. 6. Pt will perform LB dressing with Min A.  7. Pt will perform toileting task with Min A.  8. Pt will perform toilet transfer with Min A.   OCCUPATIONAL THERAPY DAILY NOTE  Patient Name:Mj Wolff  Time In: 1030  Time Out: 1130     Time In: 5177  Time Out: 1400     Medical Diagnosis:  Sepsis  Sepsis associated with internal vascular access, subsequent encounter ([facility] [address] Sepsis associated with internal vascular access ([facility] [address]      Pain at start of AM tx: 6/10 L calf  Pain at stop of AM tx: 8/10 L calf - Pt states intent to ask nursing for PRN pain medication    Pain at start of PM tx: 5/10 LLE  Pain at stop of PM tx: Did not report     Patient identified with name and : yes  Subjective: Pt very pleasant and cooperative with treatment despite having continued increase in pain.  He is reluctant to ask nurse for PRN medication at the end of treatment session and reports fears of addiction. OT educated pt that MD has prescribed Tylenol PRN, and pt reports feeling better about taking this medication. He further states intent to ask his nurse for this particular medication at the end of tx session. Objective:      THERAPEUTIC ACTIVITY Daily Assessment     Pt participated in static standing tolerance activity while playing tabletop game. He stood for 2 trials (7:37 minutes and 15:48 minutes) with RW with CGA while demonstrating unilateral UE reaching to play the game. Pt encouraged to attempt to maintain equal BLE weightbearing, however at times he does rely heavily on his arms to unweight LLE due to significant increase in LLE pain. THERAPEUTIC EXERCISE Daily Assessment     Pt participated in seated UE therex for UE and core strengthening to carryover to ADLs and functional mobility. He performed 3 sets x 15 reps chest press, shoulder flexion, and forward rows while sitting upright edge of w/c with 3# dowel with rest breaks between each set. MOBILITY/TRANSFERS Daily Assessment      Pt performed repetitive sit <> stand transfers x7 w/c <> RW with focus on attempting to bear equal weight through BLEs. Pt able to perform a CGA level with minimal cues for stepping back to the w/c before sitting down, though he does demonstrate compensatory technique to decreased LLE weightbearing as a result of decreased tolerance. Assessment: Pt demonstrates improved standing balance today during functional activity however does continue to present with decreased tolerance to LLE weightbearing. Plan of Care: Continue POC to maximize pt independence and safety performing ADLs and functional transfers/mobility.      Monae Dunlap OTR  8/9/2017

## 2017-08-09 NOTE — PROGRESS NOTES
[address]  [address] [place] [address], [address]     INPATIENT REHABILITATION  DAILY PROGRESS NOTE     Date: 8/9/2017    Name: Chastity Varghese Age / Sex: 59 y.o. / male   CSN: [id number] MRN: [id number]   [facility] Date: 8/7/2017 Length of Stay: 2 days     Primary Rehab Diagnosis: Impaired Mobility and ADLs secondary to:  1. S/P Removal of infected graft; Repair of left superficial femoral artery; Repair of left common femoral artery (7/25/2017 - Dr. Helder Martin)  2. History of sepsis associated with infection of vascular bypass graft      Subjective:     Patient seen and examined. Blood pressure controlled. Team conference was held at bedside this AM.     Patient's Complaint:   No significant medical complaints    Pain Control: stable, mild-to-moderate joint symptoms intermittently, reasonably well controlled by current meds      Objective:     Vital Signs:  Patient Vitals for the past 24 hrs:   BP Temp Pulse Resp SpO2   08/09/17 0756 127/66 96 °F (35.6 °C) 62 17 99 %   08/09/17 0617 118/70 - 62 - -   08/08/17 2200 126/70 - 66 - -   08/08/17 2000 139/75 98.3 °F (36.8 °C) 62 17 97 %   08/08/17 1600 153/75 96.9 °F (36.1 °C) 66 18 97 %   08/08/17 1241 160/80 - 68 - -        Physical Examination:  GENERAL SURVEY: Patient is awake, alert, oriented x 3, sitting comfortably on the chair, not in acute respiratory distress.   HEENT: pale palpebral conjunctivae, anicteric sclerae, no nasoaural discharge, moist oral mucosa  NECK: supple, no jugular venous distention, no palpable lymph nodes  CHEST/LUNGS: symmetrical chest expansion, good air entry, clear breath sounds  HEART: adynamic precordium, good S1 S2, no S3, regular rhythm, no murmurs  ABDOMEN: flat, bowel sounds appreciated, soft, non-tender  EXTREMITIES: (+) WoundVac on left groin, (+) erythema on shin area of left leg, (+) ~1.5 cm x ~1.5 cm ulcer on right heel, (+) <1 cm x <1 cm wound above lateral malleolus of right ankle, pale nailbeds, no edema, full and equal pulses, no calf tenderness   NEUROLOGICAL EXAM: The patient is awake, alert and oriented x3, able to answer questions fairly appropriately, able to follow 1 and 2 step commands. Able to tell time from the wall clock. Cranial nerves II-XII are grossly intact. No gross sensory deficit.   Motor strength is 4+/5 on BUE, 4+/5 on the RLE, 3+/5 on the left hip, 4-/5 on the left knee and left ankle.     Incision(s): healing well, clean, dry, and intact        Current Medications:  Current Facility-Administered Medications   Medication Dose Route Frequency    losartan (COZAAR) tablet 50 mg  50 mg Oral DAILY    sodium chloride tablet 1 g  1 g Oral BID WITH MEALS    magnesium oxide (MAG-OX) tablet 400 mg  400 mg Oral DAILY    naproxen (NAPROSYN) tablet 375 mg  375 mg Oral BID WITH MEALS    collagenase (SANTYL) 250 unit/gram ointment   Topical DAILY    alteplase (CATHFLO) 1 mg in sterile water (preservative free) 1 mL injection  1 mg InterCATHeter PRN    acetaminophen (TYLENOL) tablet 650 mg  650 mg Oral Q4H PRN    docusate sodium (COLACE) capsule 100 mg  100 mg Oral BID    bisacodyl (DULCOLAX) tablet 10 mg  10 mg Oral Q48H PRN    amiodarone (CORDARONE) tablet 200 mg  200 mg Oral DAILY    ascorbic acid (vitamin C) (VITAMIN C) tablet 250 mg  250 mg Oral DAILY WITH BREAKFAST    aspirin chewable tablet 81 mg  81 mg Oral DAILY WITH BREAKFAST    ceFAZolin (ANCEF) 2g IVPB in 50 mL D5W  2 g IntraVENous Q8H    cholecalciferol (VITAMIN D3) tablet 1,000 Units  1,000 Units Oral DAILY    cyanocobalamin tablet 1,000 mcg  1,000 mcg Oral DAILY    dilTIAZem (CARDIZEM) IR tablet 30 mg  30 mg Oral AC&HS    DULoxetine (CYMBALTA) capsule 60 mg  60 mg Oral DAILY    furosemide (LASIX) tablet 20 mg  20 mg Oral DAILY    gabapentin (NEURONTIN) capsule 300 mg  300 mg Oral Q8H    lactobacillus sp. 50 billion cpu (BIO-K PLUS) capsule 1 Cap  1 Cap Oral DAILY    metoprolol tartrate (LOPRESSOR) tablet 12.5 mg  12.5 mg Oral Q12H    oxyCODONE IR (ROXICODONE) tablet 20 mg  20 mg Oral TID    polyethylene glycol (MIRALAX) packet 17 g  17 g Oral DAILY    potassium chloride (K-DUR, KLOR-CON) SR tablet 20 mEq  20 mEq Oral BID    oxyCODONE IR (ROXICODONE) tablet 15-20 mg  15-20 mg Oral Q4H PRN    zinc sulfate (ZINCATE) capsule 220 mg  1 Cap Oral DAILY    atorvastatin (LIPITOR) tablet 40 mg  40 mg Oral QHS    WARFARIN INFORMATION NOTE (COUMADIN)   Other Q24H       Allergies: Allergies   Allergen Reactions    Morphine Other (comments)     Patient gets confused with morphine.        Functional Progress:    PHYSICAL THERAPY    ON ADMISSION MOST RECENT   Wheelchair Mobility/Management  Able to Propel (ft): 232 feet  Functional Level: 5  Curbs/Ramps Assist Required (FIM Score): 0 (Not tested)  Wheelchair Setup Assist Required : 5 (Supervision/setup)  Wheelchair Management: Manages left brake, Manages right brake Wheelchair Mobility/Management  Able to Propel (ft): 232 feet  Functional Level: 5  Curbs/Ramps Assist Required (FIM Score): 0 (Not tested)  Wheelchair Setup Assist Required : 5 (Supervision/setup)  Wheelchair Management: Manages left brake, Manages right brake     Gait  Amount of Assistance: 4 (Minimal assistance)  Distance (ft): 12 Feet (ft) (x 2 repetitions)  Assistive Device: Walker, rolling Gait  Amount of Assistance: 4 (Minimal assistance)  Distance (ft): 80 Feet (ft) (x 2 repetitions)  Assistive Device: Walker, rolling     Balance-Sitting/Standing  Sitting - Static: Good (unsupported)  Sitting - Dynamic: Good (unsupported)  Standing - Static: Fair  Standing - Dynamic : Impaired Balance-Sitting/Standing  Sitting - Static: Good (unsupported)  Sitting - Dynamic: Good (unsupported)  Standing - Static: Fair  Standing - Dynamic : Impaired     Bed/Mat Mobility  Rolling Right : 5 (Supervision)  Rolling Left : 5 (Supervision)  Supine to Sit : 5 (Supervision)  Sit to Supine : 5 (Supervision) Bed/Mat Mobility  Rolling Right : 5 (Supervision)  Rolling Left : 5 (Supervision)  Supine to Sit : 5 (Supervision)  Sit to Supine : 5 (Supervision)     Transfers  Other: stand step with RW  Transfer Assistance : 3 (Moderate assistance ) (min-mod assist secondary to L LE pain)  Sit to Stand Assistance: Moderate assistance (min-mod assist secondary to L LE pain)  Car Transfers: Not tested  Car Type: N/A Transfers  Other: stand step with RW  Transfer Assistance : 3 (Moderate assistance ) (min-mod assist secondary to L LE pain)  Sit to Stand Assistance: Minimal assistance  Car Transfers: Not tested  Car Type: N/A     Steps or Stairs  Steps/Stairs Ambulated (#): 0  Level of Assist : 0 (Not tested)  Rail Use: None Steps or Stairs  Steps/Stairs Ambulated (#): 0  Level of Assist : 0 (Not tested)  Rail Use: None         Lab/Data Review:  Recent Results (from the past 24 hour(s))   GLUCOSE, POC    Collection Time: 08/08/17 12:37 PM   Result Value Ref Range    Glucose (POC) 121 (H) 70 - 110 mg/dL   GLUCOSE, POC    Collection Time: 08/08/17  4:06 PM   Result Value Ref Range    Glucose (POC) 101 70 - 110 mg/dL   PROTHROMBIN TIME + INR    Collection Time: 08/09/17  4:45 AM   Result Value Ref Range    Prothrombin time 27.4 (H) 11.5 - 15.2 sec    INR 2.7 (H) 0.8 - 1.2         Estimated Glomerular Filtration Rate:  On admission, estimated GFR based on a Creatinine of 0.65 mg/dl:   Using CKD-EPI = 106.5 mL/min/1.73m2   Using MDRD = 133.6 mL/min/1.73m2      Assessment:     Primary Rehabilitation Diagnosis  1. Impaired Mobility and ADLs  2. S/P Removal of infected graft; Repair of left superficial femoral artery; Repair of left common femoral artery (7/25/2017 - Dr. Linda Hannah);  History of sepsis associated with infection of vascular bypass graft     Comorbidities       Patient Active Problem List   Diagnosis Code    Benign hypertensive heart disease with systolic congestive heart failure, NYHA class 2  I11.0, I50.20    DDD (degenerative disc disease), lumbar M51.36    Erectile dysfunction N52.9    Spinal stenosis of lumbar region with radiculopathy M48.06, M54.16    Hereditary peripheral neuropathy G60.9    Aortoiliac occlusive disease  I74.09    Atherosclerosis of native artery of both lower extremities with intermittent claudication  I70.213    Peripheral artery disease  I73.9    Coronary artery disease involving native coronary artery of native heart I25.10    Chronic atrial fibrillation  I48.2    Acute blood loss as cause of postoperative anemia D62    Impaired mobility and ADLs Z74.09    Anticoagulated on Coumadin Z51.81, Z79.01    Ischemic cardiomyopathy I25.5    Chronic systolic heart failure  Y07.99    Carotid artery disease  I77.9    Hyperammonemia  E72.20    Dyslipidemia E78.5    Euthyroid sick syndrome E07.81    Aftercare following surgery of the circulatory system Z48.812    Status post femoral-popliteal bypass surgery Z95.828    History of cardioversion Z98.890    Critical ischemia of lower extremity I99.8    Chronic ulcer of right foot  L97.519    History of vitamin D deficiency Z86.39    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 Justification for continued stay: Good progression towards established rehabilitation goals. 2. Medical Issues being followed closely:    [x]  Fall and safety precautions     [x]  Wound Care     [x]  Bowel and Bladder Function     [x]  Fluid Electrolyte and Nutrition Balance     [x]  Pain Control      3. Issues that 24 hour rehabilitation nursing is following:    [x]  Fall and safety precautions     [x]  Wound Care     [x]  Bowel and Bladder Function     [x]  Fluid Electrolyte and Nutrition Balance     [x]  Pain Control      [x]  Assistance with and education on in-room safety with transfers to and from the bed, wheelchair, toilet and shower.       4. Acute rehabilitation plan of care: [x]  Continue current care and rehab. [x]  Physical Therapy           [x]  Occupational Therapy           []  Speech Therapy     []  Hold Rehab until further notice     5. Medications:    [x]  MAR Reviewed     [x]  Continue Present Medications     6. DVT Prophylaxis:      []  Lovenox     []  Unfractionated Heparin     [x]  Coumadin     []  NOAC     []  VASQUEZ Stockings     []  Sequential Compression Device     []  None     7. Orders:   > S/P Removal of infected graft; Repair of left superficial femoral artery; Repair of left common femoral artery (7/25/2017 - Dr. Malu Chahal);  History of sepsis associated with infection of vascular bypass graft   > Continue Cefazolin 2 grams IVPB q 8 hr until 9/13/2017    > Acute Postoperative Blood Loss Anemia   > Hgb/Hct (8/8/2017, on admission) = 8.7/27.0   > Anemia work-up showed serum iron 28, TIBC 211, iron % saturation 13, ferritin 1105, reticulocyte count 8.8   > On 8/8/2017:    > Discontinued FeSO4 325 mg PO once daily with breakfast     > Patient was given Epoetin constance 10,000 units SC x 1 dose    > Benign hypertensive heart disease with chronic systolic heart failure   > On 8/8/2017, increased Losartan from 25 mg to 50 mg PO once daily (6AM)   > Continue:    > Diltiazem IR 30 mg PO TID AC and q HS    > Furosemide 20 mg PO once daily (6AM0    > Losartan 50 mg PO once daily (6AM)    > Metoprolol tartrate 12.5 mg PO q 12 hr (9AM, 9PM)    > Paroxysmal atrial fibrillation, presently normal sinus rhythm, anticoagulated on Coumadin   > On 8/8/2017, discontinued Enoxaparin 40 mg SC q 24 hr   > Continue:    > Amiodarone 200 mg PO once daily    > Metoprolol tartrate 12.5 mg PO q 12 hr (9AM, 9PM)   > Target INR = 2 to 3   > INR 2.7   > Patient received Coumadin 5 mg PO last night   > Coumadin 1 mg PO tonight    > Chronic ulcer of right heel    > Podiatry consult (Dr. Leola Alanis) called for recommendations on care   > Continue:    > Rigid heel suspension boots on both feet when supine in bed    > Collagenase ointment applied to right heel ulcer once daily, then cover with dry sterile dressing    > Analgesia   > Continue:    > Acetaminophen 650 mg PO q 4 hr PRN for pain level less than 5/10    > Naproxen 375 mg PO BID PC x 5 doses    > Oxycodone 20 mg PO TID (8AM, 12PM, 4PM)    > Oxycodone 15-20 mg PO q 4 hr PRN for pain level greater than 4/10 (from 8PM to 4AM only)     > Na (8/8/2017, on admission) = 129   > On 8/8/2017, patient was started on NaCl 1 gram PO BID    > Mg (8/8/2017, on admission) = 1.6   > On 8/8/2017, patient was started on Mg(OH)2 400 mg PO once daily      8. Patient's progress in rehabilitation and medical issues discussed with the patient. All questions answered to the best of my ability. Care plan discussed with patient and nurse.       Signed:    Fanta Reyes MD    August 9, 2017

## 2017-08-09 NOTE — INTERDISCIPLINARY ROUNDS
[facility] PHYSICAL REHABILITATION  [address] [place] [address], [address]    INPATIENT REHABILITATION  PRE-TEAM CONFERENCE SUMMARY     Date of Conference: 8/9/2017    Name: Stanley Domingo Age / Sex: 59 y.o. / male   CSN: [id number] MRN: [id number]   [facility] Date: 8/7/2017 Length of Stay: 2 day     Primary Rehabilitation Diagnosis  1. Impaired Mobility and ADLs  2. S/P Removal of infected graft; Repair of left superficial femoral artery; Repair of left common femoral artery (7/25/2017 - Dr. Janel Mckeon);  History of sepsis associated with infection of vascular bypass graft     Comorbidities   Benign hypertensive heart disease with systolic congestive heart failure, NYHA class 2  I11.0, I50.20    DDD (degenerative disc disease), lumbar M51.36    Erectile dysfunction N52.9    Spinal stenosis of lumbar region with radiculopathy M48.06, M54.16    Hereditary peripheral neuropathy G60.9    Aortoiliac occlusive disease  I74.09    Atherosclerosis of native artery of both lower extremities with intermittent claudication  I70.213    Peripheral artery disease  I73.9    Coronary artery disease involving native coronary artery of native heart I25.10    Chronic atrial fibrillation  I48.2    Acute blood loss as cause of postoperative anemia D62    Impaired mobility and ADLs Z74.09    Anticoagulated on Coumadin Z51.81, Z79.01    Ischemic cardiomyopathy I25.5    Chronic systolic heart failure  B64.06    Carotid artery disease  I77.9    Hyperammonemia  E72.20    Dyslipidemia E78.5    Euthyroid sick syndrome E07.81    Aftercare following surgery of the circulatory system Z48.812    Status post femoral-popliteal bypass surgery Z95.828    History of cardioversion Z98.890    Critical ischemia of lower extremity I99.8    Chronic ulcer of right foot  L97.519    History of vitamin D deficiency Z86.39    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    Chronic anemia D64.9    Chronic ulcer of right heel  L97.419          Therapy:     FIM SCORES Initial Assessment Weekly Progress Assessment 8/9/2017   Eating Functional Level: 6  6 - Mod I   Swallowing     Grooming    5 - Setup   Bathing 4  4 - Min A      Upper Body Dressing Functional Level: 5  Items Applied/Steps Completed: Pullover (4 steps)  Comments: Setup only  5 - Setup   Lower Body Dressing Functional Level: 3  Items Applied/Steps Completed: Elastic waist pants (3 steps), Sock, left (1 step), Sock, right (1 step)  Comments: Pt able to jasmeet socks and thread LEs into shorts while seated on EOB with slight assistance for management of wound vac lines. Pt requires Min-Mod A for standing balance while standing with RW and additional assistance for managing clothing over buttocks due to pt requiring constant BUE support on the walker in standing. 3 - Mod A   Toileting Functional Level: 2  Comments: Based on performance of LB bathing and dressing, pt would require maximal assistance for clothing management and thorough hygiene with Min-Mod A for static standing balance while assistance is provided for clothing. 2 - Max A   Bladder - level of assist 6 3   Bladder - accident frequency score 6 6   Bowel - level of assist 2     Bowel - accident frequency score 6     Toilet Transfer [place] Toilet Transfer Score: 3  Comments: Based on bed to w/c transfer, pt would require Mod A to perform stand step transfer to/from [facility] utilizing RW. Mod A required due to impaired balance, posterior lean, decreased strength, and inability to weightbear on LLE. 3 - Mod A   Tub/Shower Transfer [place]alls Tub/Shower Transfer Score: 3  Comments: Based on bed to w/c transfer, pt would require Mod A for balance to perform stand step transfer using RW due to impaired balance, posterior lean, decreased strength, and inability to weightbear on LLE this session.   3 - Mod A      Comprehension Primary Mode of Comprehension: Auditory  Score: 6 Auditory  6   Expression Primary Mode of Expression: Verbal  Score: 6 Verbal  6   Social Interaction Score: 5 6   Problem Solving Score: 5 6   Memory Score: 5 6     FIM SCORES Initial Assessment Weekly Progress Assessment 8/9/2017   Bed/Chair/Wheelchair Transfers Other: stand step with RW  Transfer Assistance : 3 (Moderate assistance ) (min-mod assist secondary to L LE pain)  Sit to Stand Assistance:  Moderate assistance (min-mod assist secondary to L LE pain)  Car Transfers: Not tested  Car Type: N/A Sit to Stand Assistance: Minimal assistance   Bed Mobility Rolling Right 5 (Supervision)   Rolling Left 5 (Supervision)   Supine to Sit 5 (Supervision)   Sit to Stand Moderate assistance (min-mod assist secondary to L LE pain)   Sit to Supine 5 (Supervision)    Rolling Right       Rolling Left       Supine to Sit       Sit to Stand   Minimal assistance   Sit to Supine          Locomotion (W/C) Able to Propel (ft): 232 feet  Functional Level: 5  Curbs/Ramps Assist Required (FIM Score): 0 (Not tested)  Wheelchair Setup Assist Required : 5 (Supervision/setup)  Wheelchair Management: Manages left brake, Manages right brake Function 5  Setup Assistance         Locomotion (W/C distance) 232 Feet     Locomotion (Walk) 4 (Minimal assistance) 4 (Minimal assistance)  Walker, rolling   Locomotion (Walk dist.) 12 Feet (ft) (x 2 repetitions) 80 Feet (ft) (x 2 repetitions)   Steps/Stairs Steps/Stairs Ambulated (#): 0  Level of Assist : 0 (Not tested)  Rail Use: None   Steps/Stairs Ambulated (#): 0  Level of Assist : 0 (Not tested)  Rail Use: None         Nursing:     Neuro:   A&O x__4__                   Respiratory:   [x] WNL   [] O2   [] LPM ______   Other:  Peripheral Vascular:   [] TEDS present   [] Edema present ____ Grade   Cardiac:   [x] WNL   [] Other  Genitourinary:   [x] continent   [] incontinent   [] lopez  Abdominal _______ LBM  GI: __Cardia_____ Diet ___thin___ Liquids _____ tube feeds  Musculoskeletal: ____ ROM Transfers ___W/C__ Assistive Device Used  ___Min_ Level of Assistance  Skin Integumentary:   [] Intact   [x] Not Intact   __________Preventative Measures  Details___________________wound vac changed by wound nurses_________________________________________  Pain: [] Controlled   [x] Not Controlled   Pain Meds:   [x] Scheduled   [x] PRN        Registered Dietitian / Nutrition:     Patient Vitals for the past 100 hrs:   % Diet Eaten   08/08/17 1758 100 %   08/08/17 1312 85 %   08/08/17 0910 100 %   08/07/17 1800 25 %               Supplements:          [] Yes   [] No      Amount of supplement consumed:        Intake/Output Summary (Last 24 hours) at 08/09/17 0911  Last data filed at 08/09/17 0400   Gross per 24 hour   Intake              500 ml   Output             1600 ml   Net            -1100 ml                              Last bowel movement:         Interdisciplinary Team Goals:     1. Goal  Encourage pt to perform sit <> stand transfers with CGA while maintaining equal B LE weight bearing. Barrier  L LE pain, Impaired strength, Impaired balance, Impaired transfers    Intervention  Education, Strength and Balance training, Transfer training     2. Goal  Encourage pt to stand during ADLs with CGA. Barrier  LLE pain, Impaired strength, Impaired balance    Intervention  ADL training, Therapeutic activity, Therapeutic exercise     3. Goal      Barrier      Intervention       4. Goal  Pt pain levels will be 4 or below    Barrier  Pt has uncontrolled pain    Intervention  Scheduled pain meds and prn pain meds, nursing rounds, encourage pt to verbalize pain med needs     5. Goal      Barrier      Intervention       6. Goal      Barrier      Intervention         Disposition / Discharge Planning:      Follow-up therapy services:  tbd   DME recommendations:  tbd    Estimated discharge date:  tbd   Discharge Location:  home         Electronic Signatures:      Signature Date Signed   Physical Therapist    Munira Encinas PT, DPT 8/08/2017   Occupational Therapist    Vance Vásquez, [address]  8/9/2017   Speech Therapist         Recreational Therapist         Nursing    Ashish Mendez RN, BSN 8/9/17   Dietitian         Clinical Psychologist         Physician    Funmi Titus MD   8/8/2017        ARNOLD Barnett  8/8/17         The above information has been reviewed with the patient in a language that they can understand. Opportunity for comments and questions has been provided and a signed attestation has been scanned into the \"media tab\" of the EMR.       Patient Signature: ______________________________________________________    Date Signed: __________________________________________________________

## 2017-08-09 NOTE — PROGRESS NOTES
Pt is a 59year old male admitted to ARU for sepsis. Pt is alert and oriented alone in the room.      Pt states that he lives, alone in a trailer with 3 steps to enter and a right handrail. Pt states that he has a tub shower.      Pt states that prior to admission he was able to self care . Pt states that he was working full time at [employer] and has not been back since April 17.      Pt states that he is  from his wife but remains legally . Pt denies having a POA and notes that he wants his daughter, Lalito Hodges ([phone]) to be his NOK contact.      Pt verifies his insurance as [employer]. Sw explained dc planning, team conference and insurance updates.      Pt states that he plans to stay with his daughter ([address], [place], [address]) and her fiance at [address] will follow.

## 2017-08-09 NOTE — PROGRESS NOTES
NUTRITION    Nursing Referral: Pressure Ulcer  Nutrition Consult: General Nutrition Management and Supplements     RECOMMENDATIONS / PLAN:     - Add supplement: Jl BID  - Continue RD inpatient monitoring and evaluation     NUTRITION INTERVENTIONS & DIAGNOSIS:     [x] Meals/snacks: modified diet  [x] Medical food supplement: add    Nutrition Diagnosis:  Increased protein needs related to promotion of wound healing as evidenced by pressure ulcer wound    ASSESSMENT:     8/9: Pt unavailable at time of visit. Has good appetite and meal intake per chart. Noted pressure ulcer wound/ skin breakdown    8/8: Pt reported having good appetite and meal intake PTA. Had fair appetite and meal intake this morning at breakfast; consumed ~50% of meal per report. Pt on coumadin; vitamin K consistent diet discussed with pt; he reported being already familiar with dietary considerations.  Pt denied having any concerns at time of visit    Average po intake adequate to meet patients estimated nutritional needs:   [] Yes     [] No   [x] Unable to determine at this time    Diet: DIET CARDIAC Regular; High Pro/Pete Protein       Food Allergies:  None known   Current Appetite:   [x] Good (per chart)    [] Fair      [] Poor     [] Other:  Appetite/meal intake prior to admission:   [x] Good     [] Fair     []  Poor    [] Other:  Feeding Limitations:  [] Swallowing difficulty    [x] Chewing difficulty: missing teeth; but reported tolerating diet    [] Other:  Current Meal Intake:   Patient Vitals for the past 100 hrs:   % Diet Eaten   08/09/17 1353 75 %   08/09/17 0930 75 %   08/08/17 1758 100 %   08/08/17 1312 85 %   08/08/17 0910 100 %   08/07/17 1800 25 %     BM: 8/8 - formed  Skin Integrity: right ankle wound, leg vascular wound; right lower leg wound; right abdomen wound; left groin wound; left thigh wound; unstageable pressure ulcer on left heel; vascular wound right heel  Edema: none   Pertinent Medications: Reviewed: KCl, zinc sulfate, ferrous sulfate, cyanocobalamin, vitamin D3, vitamin C     Recent Labs      08/08/17   0636  08/07/17   0130   NA  129*  133*   K  4.4  3.9   CL  93*  95*   CO2  25  29   GLU  90  90   BUN  7  8   CREA  0.65  0.67   CA  8.8  8.7   MG  1.6   --    ALB   --   2.4*   SGOT   --   78*   ALT   --   33       Intake/Output Summary (Last 24 hours) at 08/09/17 1702  Last data filed at 08/09/17 1353   Gross per 24 hour   Intake              650 ml   Output             2050 ml   Net            -1400 ml       Anthropometrics:  Ht Readings from Last 1 Encounters:   07/24/17 5' 10\" (1.778 m)     Last 3 Recorded Weights in this Encounter    08/07/17 1719   Weight: 75 kg (165 lb 5.5 oz)     Body mass index is 23.72 kg/(m^2). Weight History:  Pt denied experiencing any recent changes in weight PTA. Weight Metrics 8/7/2017 8/5/2017 7/24/2017 7/17/2017 7/12/2017 6/27/2017 6/22/2017   Weight 165 lb 5.5 oz 165 lb 2 oz - 156 lb 156 lb 14.4 oz - 165 lb   BMI 23.72 kg/m2 - 23.69 kg/m2 22.38 kg/m2 - 22.51 kg/m2 23.68 kg/m2        Admitting Diagnosis: Sepsis  Sepsis associated with internal vascular access, subsequent encounter [facility])  Pertinent PMHx: CAD, HTN, PAD    Education Needs:        [] None identified  [] Identified - Not appropriate at this time  [x]  Identified and addressed - refer to education log (coumadin and vitamin K consistent diet)  Learning Limitations:   [x] None identified  [] Identified    Cultural, Mu-ism & ethnic food preferences:  [x] None identified    [] Identified and addressed     ESTIMATED NUTRITION NEEDS:     Calories: 8272-7907 kcal (30-35 kcal/kg) based on  [x] Actual BW:  75 kg     [] IBW   Protein:  gm (1.2-1.5 gm/kg) based on  [x] Actual BW      [] IBW   Fluid: 1 mL/kcal     MONITORING & EVALUATION:     Nutrition Goal(s):   1. Po intake of meals will meet >75% of patient estimated nutritional needs within the next 7 days.   Outcome:  [x] Met/Ongoing    []  Not Met     [] New/Initial Goal Monitoring:   [x] Diet tolerance   [x] Meal intake   [] Supplement intake   [] GI symptoms/ability to tolerate po diet   [] Respiratory status   [] Plan of care      Previous Recommendations (for follow-up assessments only):     []   Implemented       []   Not Implemented (RD to address)     [x] No Recommendation Made     Discharge Planning: cardiac diet  [x] Participated in care planning, discharge planning, & interdisciplinary rounds as appropriate      Lucia Philippe, [address] [place] [address]  Pager: [phone]

## 2017-08-10 LAB
HCT VFR BLD AUTO: 26.6 % (ref 36–48)
HGB BLD-MCNC: 8.7 G/DL (ref 13–16)
INR PPP: 3.6 (ref 0.8–1.2)
PLATELET # BLD AUTO: 528 K/UL (ref 135–420)
PROTHROMBIN TIME: 33.7 SEC (ref 11.5–15.2)

## 2017-08-10 PROCEDURE — 77030011256 HC DRSG MEPILEX <16IN NO BORD MOLN -A

## 2017-08-10 PROCEDURE — 85018 HEMOGLOBIN: CPT | Performed by: INTERNAL MEDICINE

## 2017-08-10 PROCEDURE — 97110 THERAPEUTIC EXERCISES: CPT

## 2017-08-10 PROCEDURE — 74011250636 HC RX REV CODE- 250/636: Performed by: INTERNAL MEDICINE

## 2017-08-10 PROCEDURE — 97530 THERAPEUTIC ACTIVITIES: CPT

## 2017-08-10 PROCEDURE — 85610 PROTHROMBIN TIME: CPT | Performed by: INTERNAL MEDICINE

## 2017-08-10 PROCEDURE — 85049 AUTOMATED PLATELET COUNT: CPT | Performed by: INTERNAL MEDICINE

## 2017-08-10 PROCEDURE — 74011250637 HC RX REV CODE- 250/637: Performed by: INTERNAL MEDICINE

## 2017-08-10 PROCEDURE — 97116 GAIT TRAINING THERAPY: CPT

## 2017-08-10 PROCEDURE — 65310000000 HC RM PRIVATE REHAB

## 2017-08-10 RX ADMIN — OXYCODONE HYDROCHLORIDE 20 MG: 5 TABLET ORAL at 11:47

## 2017-08-10 RX ADMIN — CEFAZOLIN SODIUM 2 G: 2 SOLUTION INTRAVENOUS at 11:49

## 2017-08-10 RX ADMIN — Medication 400 MG: at 09:11

## 2017-08-10 RX ADMIN — GABAPENTIN 300 MG: 300 CAPSULE ORAL at 05:26

## 2017-08-10 RX ADMIN — GABAPENTIN 300 MG: 300 CAPSULE ORAL at 13:00

## 2017-08-10 RX ADMIN — Medication 1 CAPSULE: at 09:10

## 2017-08-10 RX ADMIN — OXYCODONE HYDROCHLORIDE 15 MG: 5 TABLET ORAL at 00:40

## 2017-08-10 RX ADMIN — SODIUM CHLORIDE TAB 1 GM 1 G: 1 TAB at 09:10

## 2017-08-10 RX ADMIN — DOCUSATE SODIUM 100 MG: 100 CAPSULE, LIQUID FILLED ORAL at 09:10

## 2017-08-10 RX ADMIN — SODIUM CHLORIDE TAB 1 GM 1 G: 1 TAB at 17:47

## 2017-08-10 RX ADMIN — METOPROLOL TARTRATE 12.5 MG: 25 TABLET ORAL at 21:38

## 2017-08-10 RX ADMIN — Medication 250 MG: at 09:10

## 2017-08-10 RX ADMIN — NAPROXEN 375 MG: 250 TABLET ORAL at 17:46

## 2017-08-10 RX ADMIN — DILTIAZEM HYDROCHLORIDE 30 MG: 30 TABLET, FILM COATED ORAL at 17:47

## 2017-08-10 RX ADMIN — COLLAGENASE SANTYL: 250 OINTMENT TOPICAL at 09:12

## 2017-08-10 RX ADMIN — CEFAZOLIN SODIUM 2 G: 2 SOLUTION INTRAVENOUS at 21:20

## 2017-08-10 RX ADMIN — NAPROXEN 375 MG: 250 TABLET ORAL at 09:11

## 2017-08-10 RX ADMIN — ERYTHROPOIETIN 10000 UNITS: 10000 INJECTION, SOLUTION INTRAVENOUS; SUBCUTANEOUS at 13:00

## 2017-08-10 RX ADMIN — DILTIAZEM HYDROCHLORIDE 30 MG: 30 TABLET, FILM COATED ORAL at 11:47

## 2017-08-10 RX ADMIN — DOCUSATE SODIUM 100 MG: 100 CAPSULE, LIQUID FILLED ORAL at 17:46

## 2017-08-10 RX ADMIN — POTASSIUM CHLORIDE 20 MEQ: 20 TABLET, EXTENDED RELEASE ORAL at 09:10

## 2017-08-10 RX ADMIN — OXYCODONE HYDROCHLORIDE 20 MG: 5 TABLET ORAL at 09:10

## 2017-08-10 RX ADMIN — GABAPENTIN 300 MG: 300 CAPSULE ORAL at 21:37

## 2017-08-10 RX ADMIN — LOSARTAN POTASSIUM 50 MG: 50 TABLET ORAL at 05:26

## 2017-08-10 RX ADMIN — OXYCODONE HYDROCHLORIDE 20 MG: 5 TABLET ORAL at 17:46

## 2017-08-10 RX ADMIN — DULOXETINE HYDROCHLORIDE 60 MG: 30 CAPSULE, DELAYED RELEASE ORAL at 09:11

## 2017-08-10 RX ADMIN — POLYETHYLENE GLYCOL 3350 17 G: 17 POWDER, FOR SOLUTION ORAL at 18:03

## 2017-08-10 RX ADMIN — FUROSEMIDE 20 MG: 20 TABLET ORAL at 05:26

## 2017-08-10 RX ADMIN — DILTIAZEM HYDROCHLORIDE 30 MG: 30 TABLET, FILM COATED ORAL at 06:30

## 2017-08-10 RX ADMIN — AMIODARONE HYDROCHLORIDE 200 MG: 200 TABLET ORAL at 09:10

## 2017-08-10 RX ADMIN — DILTIAZEM HYDROCHLORIDE 30 MG: 30 TABLET, FILM COATED ORAL at 21:38

## 2017-08-10 RX ADMIN — CYANOCOBALAMIN TAB 1000 MCG 1000 MCG: 1000 TAB at 09:11

## 2017-08-10 RX ADMIN — METOPROLOL TARTRATE 12.5 MG: 25 TABLET ORAL at 09:11

## 2017-08-10 RX ADMIN — ZINC SULFATE 220 MG (50 MG) CAPSULE 220 MG: CAPSULE at 09:11

## 2017-08-10 RX ADMIN — ATORVASTATIN CALCIUM 40 MG: 40 TABLET, FILM COATED ORAL at 21:37

## 2017-08-10 RX ADMIN — OXYCODONE HYDROCHLORIDE 15 MG: 5 TABLET ORAL at 22:03

## 2017-08-10 RX ADMIN — POTASSIUM CHLORIDE 20 MEQ: 20 TABLET, EXTENDED RELEASE ORAL at 17:46

## 2017-08-10 RX ADMIN — VITAMIN D, TAB 1000IU (100/BT) 1000 UNITS: 25 TAB at 09:10

## 2017-08-10 RX ADMIN — ASPIRIN 81 MG 81 MG: 81 TABLET ORAL at 09:11

## 2017-08-10 RX ADMIN — CEFAZOLIN SODIUM 2 G: 2 SOLUTION INTRAVENOUS at 04:32

## 2017-08-10 NOTE — PROGRESS NOTES
Problem: Self Care Deficits Care Plan (Adult)  Goal: *Acute Goals and Plan of Care (Insert Text)  Long Term Goals (to be met upon discharge date) in order to increase pts functional independence and safety, and decrease burden of care:  1. Pt will perform grooming with independence. 2. Pt will perform UB bathing with modified independence. 3. Pt will perform LB bathing with modified independence. 4. Pt will perform tub/shower transfer with modified independence. 5. Pt will perform UB dressing with independence. 6. Pt will perform LB dressing with modified independence. 7. Pt will perform toileting task with modified independence. 8. Pt will perform toilet transfer with modified independence. Short Term Weekly Goals for (2017 to 8/15/2017) in order to increase pts functional independence and safety, and decrease burden of care:   1. Pt will perform grooming with independence. 2. Pt will perform UB bathing with modified independence. 3. Pt will perform LB bathing with supervision. 4. Pt will perform tub/shower transfer with Min A.  5. Pt will perform UB dressing with independence. 6. Pt will perform LB dressing with Min A.  7. Pt will perform toileting task with Min A.  8. Pt will perform toilet transfer with Min A. Outcome: Progressing Towards Goal  OCCUPATIONAL THERAPY DAILY NOTE  Patient Name:Mj Steinberg     Time In: 0930  Time Out: 1030     Time In: 1100  Time Out: 1130     Medical Diagnosis:  Sepsis  Sepsis associated with internal vascular access, subsequent encounter ([facility] Sepsis associated with internal vascular access ([facility]      Pain at start of tx:4/10  Pain at stop of tx:7/10; pain in L calf     Pain at start of tx:7/10  Pain at stop of tx:7/10; pain in L calf     Patient identified with name and :yes  Subjective: \"I really would like to go outside today. \"     Objective:      THERAPEUTIC ACTIVITY Daily Assessment     During first session, Pt participated in table top activity in standing to challenge static standing balance and improve activity tolerance. Pt played Connect 4 standing for 2 trials (6:41, 6:07) utilizing table top for balance as needed with SBA while using BUE to perform activity. Pt req min VCs for weightbearing on LLE. Pt c/o pain in LLE during standing activity. THERAPEUTIC EXERCISE Daily Assessment     During first session, Pt utilized arm cycle for 15 min for increased cardio endurance for carryover to performing ADLs and functional tasks independently and safely. Pt req 1 rest break (1:33) during exercise. During second session, Pt participated in seated TherEx for BUE strengthening and core strengthening for carryover to ADL performance and functional tasks/mobility. Pt sat forward on w/c with arm rests removed and utilized 3# therabar to perform bicep curls, chest presses, forward rows, backward rows (3 sets x 15 reps each) with short rest break between each exercise. Assessment: Pt demonstrated improved endurance during activities, however pt is limited in activities 2/2 LLE weightbearing tolerance. Plan of Care: Continue POC to maximize safety and independence with ADLs and functional tasks.      [address], [place]/L  8/10/2017

## 2017-08-10 NOTE — PROGRESS NOTES
[address]  [address] [place] [address], [address]     INPATIENT REHABILITATION  DAILY PROGRESS NOTE     Date: 8/10/2017    Name: Carlyn Nuñez Age / Sex: 59 y.o. / male   CSN: [id number] MRN: [id number]   [facility] Date: 8/7/2017 Length of Stay: 3 days     Primary Rehab Diagnosis: Impaired Mobility and ADLs secondary to:  1. S/P Removal of infected graft; Repair of left superficial femoral artery; Repair of left common femoral artery (7/25/2017 - Dr. Ana M Suazo)  2. History of sepsis associated with infection of vascular bypass graft      Subjective:     Patient seen and examined. Blood pressure controlled. Patient's Complaint:   (+) numbness from the middle of the left lower leg downwards - improved with activity    Pain Control: stable, mild-to-moderate joint symptoms intermittently, reasonably well controlled by current meds      Objective:     Vital Signs:  Patient Vitals for the past 24 hrs:   BP Temp Pulse Resp SpO2   08/10/17 0710 152/82 96.8 °F (36 °C) 64 18 97 %   08/10/17 0526 122/60 - 88 - -   08/09/17 2130 130/66 - 90 - -   08/09/17 2000 130/63 97.9 °F (36.6 °C) 93 18 98 %   08/09/17 1551 129/72 96.6 °F (35.9 °C) 62 18 97 %   08/09/17 1223 121/59 - (!) 59 - -        Physical Examination:  GENERAL SURVEY: Patient is awake, alert, oriented x 3, sitting comfortably on the chair, not in acute respiratory distress.   HEENT: pale palpebral conjunctivae, anicteric sclerae, no nasoaural discharge, moist oral mucosa  NECK: supple, no jugular venous distention, no palpable lymph nodes  CHEST/LUNGS: symmetrical chest expansion, good air entry, clear breath sounds  HEART: adynamic precordium, good S1 S2, no S3, regular rhythm, no murmurs  ABDOMEN: flat, bowel sounds appreciated, soft, non-tender  EXTREMITIES: (+) WoundVac on left groin, (+) erythema on shin area of left leg, (+) ~1.5 cm x ~1.5 cm ulcer on right heel, (+) <1 cm x <1 cm wound above lateral malleolus of right ankle, pale nailbeds, trace bipedal edema, full and equal pulses, no calf tenderness   NEUROLOGICAL EXAM: The patient is awake, alert and oriented x3, able to answer questions fairly appropriately, able to follow 1 and 2 step commands. Able to tell time from the wall clock. Cranial nerves II-XII are grossly intact. No gross sensory deficit except for numbness from the middle of the left lower leg downwards.   Motor strength is 4+/5 on BUE, 4+/5 on the RLE, 3+/5 on the left hip, 4-/5 on the left knee and left ankle.     Incision(s): healing well, clean, dry, and intact        Current Medications:  Current Facility-Administered Medications   Medication Dose Route Frequency    losartan (COZAAR) tablet 50 mg  50 mg Oral DAILY    sodium chloride tablet 1 g  1 g Oral BID WITH MEALS    magnesium oxide (MAG-OX) tablet 400 mg  400 mg Oral DAILY    naproxen (NAPROSYN) tablet 375 mg  375 mg Oral BID WITH MEALS    collagenase (SANTYL) 250 unit/gram ointment   Topical DAILY    alteplase (CATHFLO) 1 mg in sterile water (preservative free) 1 mL injection  1 mg InterCATHeter PRN    acetaminophen (TYLENOL) tablet 650 mg  650 mg Oral Q4H PRN    docusate sodium (COLACE) capsule 100 mg  100 mg Oral BID    bisacodyl (DULCOLAX) tablet 10 mg  10 mg Oral Q48H PRN    amiodarone (CORDARONE) tablet 200 mg  200 mg Oral DAILY    ascorbic acid (vitamin C) (VITAMIN C) tablet 250 mg  250 mg Oral DAILY WITH BREAKFAST    aspirin chewable tablet 81 mg  81 mg Oral DAILY WITH BREAKFAST    ceFAZolin (ANCEF) 2g IVPB in 50 mL D5W  2 g IntraVENous Q8H    cholecalciferol (VITAMIN D3) tablet 1,000 Units  1,000 Units Oral DAILY    cyanocobalamin tablet 1,000 mcg  1,000 mcg Oral DAILY    dilTIAZem (CARDIZEM) IR tablet 30 mg  30 mg Oral AC&HS    DULoxetine (CYMBALTA) capsule 60 mg  60 mg Oral DAILY    furosemide (LASIX) tablet 20 mg  20 mg Oral DAILY    gabapentin (NEURONTIN) capsule 300 mg  300 mg Oral Q8H    lactobacillus sp. 50 billion cpu (BIO-K PLUS) capsule 1 Cap  1 Cap Oral DAILY    metoprolol tartrate (LOPRESSOR) tablet 12.5 mg  12.5 mg Oral Q12H    oxyCODONE IR (ROXICODONE) tablet 20 mg  20 mg Oral TID    polyethylene glycol (MIRALAX) packet 17 g  17 g Oral DAILY    potassium chloride (K-DUR, KLOR-CON) SR tablet 20 mEq  20 mEq Oral BID    oxyCODONE IR (ROXICODONE) tablet 15-20 mg  15-20 mg Oral Q4H PRN    zinc sulfate (ZINCATE) capsule 220 mg  1 Cap Oral DAILY    atorvastatin (LIPITOR) tablet 40 mg  40 mg Oral QHS    WARFARIN INFORMATION NOTE (COUMADIN)   Other Q24H       Allergies: Allergies   Allergen Reactions    Morphine Other (comments)     Patient gets confused with morphine.        Functional Progress:    OCCUPATIONAL THERAPY    ON ADMISSION MOST RECENT   Eating  Functional Level: 6   Eating  Functional Level: 6     Grooming      Grooming        Bathing  Functional Level: 4   Bathing  Functional Level: 4     Upper Body Dressing  Functional Level: 5   Upper Body Dressing  Functional Level: 5     Lower Body Dressing  Functional Level: 3   Lower Body Dressing  Functional Level: 3     Toileting  Functional Level: 2   Toileting  Functional Level: 2     Toilet Transfers  Toilet Transfer Score: 3   Toilet Transfers  Toilet Transfer Score: 3     Tub /Shower Transfers  Tub/Shower Transfer Score: 3   Tub/Shower Transfers  Tub/Shower Transfer Score: 3       Legend:   7 - Independent   6 - Modified Independent   5 - Standby Assistance / Supervision / Set-up   4 - Minimum Assistance / Contact Guard Assistance   3 - Moderate Assistance   2 - Maximum Assistance   1 - Total Assistance / Dependent       Lab/Data Review:  Recent Results (from the past 24 hour(s))   HGB & HCT    Collection Time: 08/10/17  5:00 AM   Result Value Ref Range    HGB 8.7 (L) 13.0 - 16.0 g/dL    HCT 26.6 (L) 36.0 - 48.0 %   PROTHROMBIN TIME + INR    Collection Time: 08/10/17  5:00 AM   Result Value Ref Range    Prothrombin time 33.7 (H) 11.5 - 15.2 sec    INR 3.6 (H) 0.8 - 1.2     PLATELET    Collection Time: 08/10/17  5:00 AM   Result Value Ref Range    PLATELET 118 (H) 984 - 420 K/uL       Estimated Glomerular Filtration Rate:  On admission, estimated GFR based on a Creatinine of 0.65 mg/dl:   Using CKD-EPI = 106.5 mL/min/1.73m2   Using MDRD = 133.6 mL/min/1.73m2      Assessment:     Primary Rehabilitation Diagnosis  1. Impaired Mobility and ADLs  2. S/P Removal of infected graft; Repair of left superficial femoral artery; Repair of left common femoral artery (7/25/2017 - Dr. Jono Ruiz);  History of sepsis associated with infection of vascular bypass graft     Comorbidities       Patient Active Problem List   Diagnosis Code    Benign hypertensive heart disease with systolic congestive heart failure, NYHA class 2  I11.0, I50.20    DDD (degenerative disc disease), lumbar M51.36    Erectile dysfunction N52.9    Spinal stenosis of lumbar region with radiculopathy M48.06, M54.16    Hereditary peripheral neuropathy G60.9    Aortoiliac occlusive disease  I74.09    Atherosclerosis of native artery of both lower extremities with intermittent claudication  I70.213    Peripheral artery disease  I73.9    Coronary artery disease involving native coronary artery of native heart I25.10    Chronic atrial fibrillation  I48.2    Acute blood loss as cause of postoperative anemia D62    Impaired mobility and ADLs Z74.09    Anticoagulated on Coumadin Z51.81, Z79.01    Ischemic cardiomyopathy I25.5    Chronic systolic heart failure  E97.37    Carotid artery disease  I77.9    Hyperammonemia  E72.20    Dyslipidemia E78.5    Euthyroid sick syndrome E07.81    Aftercare following surgery of the circulatory system Z48.812    Status post femoral-popliteal bypass surgery Z95.828    History of cardioversion Z98.890    Critical ischemia of lower extremity I99.8    Chronic ulcer of right foot  L97.519    History of vitamin D deficiency Z86.39    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 Justification for continued stay: Good progression towards established rehabilitation goals. 2. Medical Issues being followed closely:    [x]  Fall and safety precautions     [x]  Wound Care     [x]  Bowel and Bladder Function     [x]  Fluid Electrolyte and Nutrition Balance     [x]  Pain Control      3. Issues that 24 hour rehabilitation nursing is following:    [x]  Fall and safety precautions     [x]  Wound Care     [x]  Bowel and Bladder Function     [x]  Fluid Electrolyte and Nutrition Balance     [x]  Pain Control      [x]  Assistance with and education on in-room safety with transfers to and from the bed, wheelchair, toilet and shower. 4. Acute rehabilitation plan of care:    [x]  Continue current care and rehab. [x]  Physical Therapy           [x]  Occupational Therapy           []  Speech Therapy     []  Hold Rehab until further notice     5. Medications:    [x]  MAR Reviewed     [x]  Continue Present Medications     6. DVT Prophylaxis:      []  Lovenox     []  Unfractionated Heparin     [x]  Coumadin     []  NOAC     []  VASQUEZ Stockings     []  Sequential Compression Device     []  None     7. Orders:   > S/P Removal of infected graft; Repair of left superficial femoral artery; Repair of left common femoral artery (7/25/2017 - Dr. Ar Bello);  History of sepsis associated with infection of vascular bypass graft   > (+) numbness from the middle of the left lower leg downwards - improved with activity   > Vascular Surgery ([address] [place] [address]) was informed of the numbness and erythema of the shin of the left leg and she was aware of it; no diagnostic studies for now; Vascular Surgery will be evaluating the patient tomorrow to see if he needs further surgical intervention   > Continue Cefazolin 2 grams IVPB q 8 hr until 9/13/2017    > Acute Postoperative Blood Loss Anemia   > Hgb/Hct (8/8/2017, on admission) = 8.7/27.0   > Anemia work-up showed serum iron 28, TIBC 211, iron % saturation 13, ferritin 1105, reticulocyte count 8.8   > On 8/8/2017:    > Discontinued FeSO4 325 mg PO once daily with breakfast     > Patient was given Epoetin constance 10,000 units SC x 1 dose   > Hgb/Hct (8/10/2017) = 8.7/26.6    > Epoetin constance 10,000 units SC x 1 dose today    > Benign hypertensive heart disease with chronic systolic heart failure   > On 8/8/2017, increased Losartan from 25 mg to 50 mg PO once daily (6AM)   > Continue:    > Diltiazem IR 30 mg PO TID AC and q HS    > Furosemide 20 mg PO once daily (6AM0    > Losartan 50 mg PO once daily (6AM)    > Metoprolol tartrate 12.5 mg PO q 12 hr (9AM, 9PM)    > Paroxysmal atrial fibrillation, presently normal sinus rhythm, anticoagulated on Coumadin   > On 8/8/2017, discontinued Enoxaparin 40 mg SC q 24 hr   > Continue:    > Amiodarone 200 mg PO once daily    > Metoprolol tartrate 12.5 mg PO q 12 hr (9AM, 9PM)   > Target INR = 2 to 3   > INR 3.6   > Patient received Coumadin 1 mg PO last night   > No Coumadin for tonight    > Chronic ulcer of right heel    > Podiatry consult (Dr. Ada Orozco) called for recommendations on care   > Continue:    > Rigid heel suspension boots on both feet when supine in bed    > Collagenase ointment applied to right heel ulcer once daily, then cover with dry sterile dressing    > Analgesia   > Continue:    > Acetaminophen 650 mg PO q 4 hr PRN for pain level less than 5/10    > Naproxen 375 mg PO BID PC x 5 doses (final dose this PM)    > Oxycodone 20 mg PO TID (8AM, 12PM, 4PM)    > Oxycodone 15-20 mg PO q 4 hr PRN for pain level greater than 4/10 (from 8PM to 4AM only)     > Na (8/8/2017, on admission) = 129   > On 8/8/2017, patient was started on NaCl 1 gram PO BID    > Mg (8/8/2017, on admission) = 1.6   > On 8/8/2017, patient was started on Mg(OH)2 400 mg PO once daily      8.  Patient's progress in rehabilitation and medical issues discussed with the patient. All questions answered to the best of my ability. Care plan discussed with patient and nurse.       Signed:    Corazon Pabon MD    August 10, 2017

## 2017-08-10 NOTE — ROUTINE PROCESS
SHIFT CHANGE NOTE FOR [facility]    Bedside and Verbal shift change report given to Kranthi Honeycutt RN (oncoming nurse) by Lisa Gutierrez RN   (offgoing nurse). Report included the following information SBAR, Kardex, MAR and Recent Results. Situation:   Code Status: Full Code   Reason for Admission: sepsis  Hospital Day: 3   Problem List:   Hospital Problems  Date Reviewed: 8/9/2017          Codes Class Noted POA    Chronic ulcer of right heel ([facility] [address] (Chronic) ICD-10-CM: L97.419  ICD-9-CM: 707.14  8/8/2017 Yes        Acute blood loss as cause of postoperative anemia ICD-10-CM: D62  ICD-9-CM: 285.1  7/25/2017 Yes        Aftercare following surgery of the circulatory system ICD-10-CM: Z48.812  ICD-9-CM: V58.73  7/25/2017 Yes    Overview Addendum 8/7/2017  2:20 PM by Ramón Rivas MD     S/P Removal of infected graft; Repair of left superficial femoral artery; Repair of left common femoral artery (7/25/2017 - Dr. Jayla Mondragon)             Impaired mobility and ADLs ICD-10-CM: Z74.09  ICD-9-CM: 799.89  7/24/2017 Yes        Infection of vascular bypass graft ([facility] [address] ICD-10-CM: T82. 7XXA  ICD-9-CM: 996.62  7/24/2017 Yes    Overview Signed 8/7/2017  2:19 PM by Ramón Rivas MD     Left lower extremity             * (Principal)Sepsis associated with internal vascular access [facility]) ICD-10-CM: T82. 7XXA, A41.9  ICD-9-CM: 996.62, 038.9  7/24/2017 Yes        Anticoagulated on Coumadin (Chronic) ICD-10-CM: Z51.81, Z79.01  ICD-9-CM: V58.83, V58.61  Unknown Yes        Chronic atrial fibrillation (HCC) (Chronic) ICD-10-CM: I48.2  ICD-9-CM: 427.31  Unknown Yes        Benign hypertensive heart disease with systolic congestive heart failure, NYHA class 2 (HCC) (Chronic) ICD-10-CM: I11.0, I50.20  ICD-9-CM: 402.11, 428.20, 428.0  1/26/2015 Yes              Background:   Past Medical History:   Past Medical History:   Diagnosis Date    Acute blood loss as cause of postoperative anemia 4/4/2017    Anticoagulated on Coumadin     Aortoiliac occlusive disease ([facility] [address] 1/25/2017    Atherosclerosis of native artery of both lower extremities with intermittent claudication ([facility] [address] 1/25/2017    Benign hypertensive heart disease with systolic congestive heart failure, NYHA class 2 ([address] 1/26/2015    Carotid artery disease (HCC)     Chronic anemia     Chronic atrial fibrillation (HCC)     Chronic systolic heart failure (HCC)     Chronic ulcer of right foot ([address] 4/4/2017    Chronic ulcer of right heel ([facility] [address] 8/8/2017    Coronary artery disease involving native coronary artery of native heart 3/15/2017    Successful stenting of Cx (Xience LESLEY) and RCA (Xience LESLEY) to 0% by Dr. Luis Alberto Vasquez on 3/15/17.  DDD (degenerative disc disease), lumbar 1/26/2015    Dyslipidemia     Erectile dysfunction 7/5/2016    Euthyroid sick syndrome 4/6/2017    Hereditary peripheral neuropathy 11/15/2016    History of cardioversion 4/18/2017    S/P Synchronized external cardioversion (4/18/2017 - Dr. Debi Pardo)    History of vitamin D deficiency 4/22/2017    Vitamin D 25-Hydroxy (4/22/2017) = 12.0; Vitamin D 25-Hydroxy (5/26/2017) = 38.7    Infection of vascular bypass graft ([facility] [address] 7/24/2017    Left lower extremity    Ischemic cardiomyopathy     Peripheral artery disease ([facility] [address] 1/25/2017    Spinal stenosis of lumbar region with radiculopathy 5/4/2015    Dr. Whit Bolaños      Patient taking anticoagulants yes   Patient has a defibrillator: no     Assessment:   Changes in Assessment throughout shift: no     Patient has central line: yes Reasons if yes: long term antibiotic  Insertion date:8/2/17 Last dressing date:8/2/17   Patient has Jiang Cath: no Reasons if yes:     Insertion date:     Last Vitals:     Vitals:    08/09/17 1551 08/09/17 2000 08/09/17 2130 08/10/17 0526   BP: 129/72 130/63 130/66 122/60   Pulse: 62 93 90 88   Resp: 18 18     Temp: 96.6 °F (35.9 °C) 97.9 °F (36.6 °C)     SpO2: 97% 98%     Weight:            PAIN    Pain Assessment    Pain Intensity 1: 0 (08/10/17 0400) Pain Intensity 1: 2 (12/29/14 1105)    Pain Location 1: Groin Pain Location 1: Abdomen    Pain Intervention(s) 1: Medication (see MAR) Pain Intervention(s) 1: Medication (see MAR)  Patient Stated Pain Goal: 0 Patient Stated Pain Goal: 0  o Intervention effective: no    o Other actions taken for pain: pain medicine     Skin Assessment  Skin color Skin Color: Appropriate for ethnicity  Condition/Temperature Skin Condition/Temp: Dry, Warm  Integrity Skin Integrity: Abrasion, Blister, Incision (comment)  Turgor Turgor: Non-tenting  Weekly Pressure Ulcer Documentation  Pressure  Injury Documentation: Pressure Injury Noted-See Wound LDA to Document  Wound Prevention & Protection Methods  Orientation of wound Orientation of Wound Prevention: Posterior  Location of Prevention Location of Wound Prevention: Buttocks, Sacrum/Coccyx  Dressing Present Dressing Present : No  Dressing Status Dressing Status: Intact  Wound Offloading Wound Offloading (Prevention Methods): Bed, pressure redistribution/air, Chair cushion, Heel boots     INTAKE/OUPUT    Date 08/09/17 0700 - 08/10/17 0659 08/10/17 0700 - 08/11/17 0659   Shift 1057-3802 9121-7520 24 Hour Total 9310-3146 3341-8271 24 Hour Total   I  N  T  A  K  E   P. O. 780  780         P. O. 780  780       I.V.  (mL/kg/hr) 150  (0.2) 100 250         I.V. 100  100         Volume (ceFAZolin (ANCEF) 2g IVPB in 50 mL D5W) 50 100 150       Shift Total  (mL/kg) 930  (12.4) 100  (1.3) 1030  (13.7)      O  U  T  P  U  T   Urine  (mL/kg/hr) 450  (0.5) 1925 2375         Urine Voided 450 1925 2375         Urine Occurrence(s) 3 x 1 x 4 x       Emesis/NG output  0 0         Emesis  0 0       Stool  0 0         Stool Occurrence(s) 0 x 0 x 0 x         Stool  0 0       Shift Total  (mL/kg) 450  (6) 1925  (25.7) 2375  (31.7)       -3488 -0786      Weight (kg) 75 75 75 75 75 75       Recommendations:  1. Patient needs and requests: pain medicine    2.  Diet: Cardiac    3. Pending tests/procedures: no     4. Functional Level/Equipment: 1 x person assist    5. Estimated Discharge Date: TDB Posted on Whiteboard in Patients Room: no     [facility] Safety Check    A safety check occurred in the patient's room between off going nurse and oncoming nurse listed above. The safety check included the below items  Area Items   H  High Alert Medications - Verify all high alert medication drips (heparin, PCA, etc.)   E  Equipment - Suction is set up for ALL patients (with cailin)  - Red plugs utilized for all equipment (IV pumps, etc.)  - WOWs wiped down at end of shift.  - Room stocked with oxygen, suction, and other unit-specific supplies   A  Alarms - Bed alarm is set for fall risk patients  - Ensure chair alarm is in place and activated if patient is up in a chair   L  Lines - Check IV for any infiltration  - Jiang bag is empty if patient has a Jiang   - Tubing and IV bags are labeled   S  Safety   - Room is clean, patient is clean, and equipment is clean. - Hallways are clear from equipment besides carts. - Fall bracelet on for fall risk patients  - Ensure room is clear and free of clutter  - Suction is set up for ALL patients (with cailin)  - Hallways are clear from equipment besides carts.    - Isolation precautions followed, supplies available outside room, sign posted

## 2017-08-10 NOTE — PROGRESS NOTES
Problem: Mobility Impaired (Adult and Pediatric)  Goal: *Acute Goals and Plan of Care (Insert Text)  Physical Therapy Goals  Short Term Goals  Initiated 8/8/2017 and to be accomplished within 5-7 day(s) (8/15/2017)  1. Patient will move from supine to sit and sit to supine , scoot up and down and roll side to side in bed with modified independence. 2. Patient will transfer from bed to chair and chair to bed with contact guard assist using the least restrictive device. 3. Patient will perform sit to stand with contact guard assist.  4. Patient will ambulate with contact guard assist for 150 feet with the least restrictive device. 5. Patient will ascend/descend 4-6 stairs with B handrail(s) with minimal assistance/contact guard assist.    Long Term Goals  Initiated 8/8/2017 and to be accomplished within 10-14 day(s) (8/22/2017)  1. Patient will move from supine to sit and sit to supine , scoot up and down and roll side to side in bed with independence. 2. Patient will transfer from bed to chair and chair to bed with modified independence using the least restrictive device. 3. Patient will perform sit to stand with modified independence. 4. Patient will ambulate with modified independence for 300 feet with the least restrictive device. 5. Patient will ascend/descend 12 stairs with B handrail(s) with modified independence. PHYSICAL THERAPY DAILY NOTE  Patient Name:Mj Castro  Time In: 0800  Time Out: 2198  Patient Seen For: Balance activities;Gait training;Patient education;Transfer training; Therapeutic exercise   Time In: 1330  Time Out: 1400  Patient Seen For: Therapeutic exercise  Diagnosis: Sepsis  Sepsis associated with internal vascular access, subsequent encounter ([facility] [address] Sepsis associated with internal vascular access [facility])  Precautions: Fall Risk Precautions     Subjective: Pt is pleasant and cooperative throughout treatment.   Pt continues to note \"tight,\" L calf and \"between pain and numbness,\" of B feet and L calf and groin/wound site. At start of second treatment session, pt reports, \"my foot's completely numb now,\" re: L foot. Pt's foot presents with pitting edema and rubor/redness through foot with mepilex noted on L heel and what appears to be a blister/hardened area on medial aspect of L foot that has been present since eval.  Pt's nurse was notified re: pt's complaints of worsening sensation. With participation in seated therapeutic exercises, pt reports an increase in sensation to PT. Pain at start of tx: 5/10 L LE/calf  Pain at stop of tx: 8/10 L LE/calf - pt notes re: pain medication, \"I'm trying to wean myself off of them if I can. \"  Educated pt to keep open communication with nursing staff and MD.     Patient identified with name and : yes         Objective:       BED/MAT MOBILITY Daily Assessment     Supine to Sit: Modified Independent - pt noted to manage wound vac line safely requiring increased time to perform as a result. TRANSFERS Daily Assessment     Sit to Stand Assistance: CGA/Minimal assistance - pt requires variable assistance this morning with sit <> stand transfers secondary to L LE pain with weight bearing. Pt requires at least minimal manual cues for ant weight shift with sit to stand and CGA with min manual cues for stand to sit. Pt participated in sit <> stand training w/c <> RW x 5 repetitions requiring reminders for 3/5 trials to scoot forward to edge of seat to assist with efficient transfer. Stand Step Commode Transfer: CGA/Minimal assistance for safe RW management with approaching commode with CGA for balance. Prior to transfer training, pt participated in seated L heel slides x 7 minutes secondary to limited L ankle DF ROM to promote increased safety and independence with functional transfers.           GAIT Daily Assessment     Amount of Assistance: 4 (Contact guard assistance)  Distance (ft): 40 Feet (ft) (x 1 trial, 60 ft x 1 trial)  Assistive Device: Walker, rolling   Pt ambulated to bathroom and then participated in gait training following toileting but reported increased L LE pain and inability to tolerate further gait trial.  Pt ambulates with partial step through to step through pattern with decreased L stance time and weight shift. STEPS or STAIRS Daily Assessment     Steps/Stairs Ambulated (#): 12  Level of Assist : 4 (Contact guard assistance)  Rail Use: Both   Pt negotiates stairs with step to step pattern with R LE leading with ascending stairs and L LE leading with descending stairs requiring CGA for safety with intermittent verbal cues to avoid overuse of UEs/avoid pulling on B handrails. BALANCE Daily Assessment     Sitting - Static: Good (unsupported)  Sitting - Dynamic: Good (unsupported)  Standing - Static: Fair  Standing - Dynamic : Impaired   Pt stood with out UE support on RW and CGA to close supervision while PT assisted with clothing management after toileting. Pt with increased postural sway noted but without major LOB. WHEELCHAIR MOBILITY Daily Assessment     Able to Propel (ft): 232 feet  Functional Level: 6  Wheelchair Setup Assist Required : 4 (Minimal assistance)  Wheelchair Management: Manages left brake;Manages right brake          LOWER EXTREMITY EXERCISES Daily Assessment     Seated LE Therapeutic Exercises:  3 Sets of 10 Repetitions:  Isometric Hip Add x 5\" holds  Hip Abd/ER with red theraband resistance  2# B LAQ  2# B Alternate Hip Flexion              Assessment: Pt is progressing with ability to participate in stair negotiation training. However, secondary to pain and decreased L LE weight bearing tolerance, pt continues to require physical assistance with functional transfers for safety. Plan of Care: Continue with current POC with emphasis on functional strengthening and safety with mobility to promote increased independence.      Ward Tanner, PT, DPT  8/10/2017

## 2017-08-10 NOTE — ROUTINE PROCESS
SHIFT CHANGE NOTE FOR [facility]    Bedside and Verbal shift change report given to [address] (oncoming nurse) by Chelsey Call RN   (offgoing nurse). Report included the following information SBAR, Kardex, MAR and Recent Results. Situation:   Code Status: Full Code   Reason for Admission: sepsis  Hospital Day: 3   Problem List:   Hospital Problems  Date Reviewed: 8/10/2017          Codes Class Noted POA    Chronic ulcer of right heel ([facility] [address] (Chronic) ICD-10-CM: L97.419  ICD-9-CM: 707.14  8/8/2017 Yes        Acute blood loss as cause of postoperative anemia ICD-10-CM: D62  ICD-9-CM: 285.1  7/25/2017 Yes        Aftercare following surgery of the circulatory system ICD-10-CM: Z48.812  ICD-9-CM: V58.73  7/25/2017 Yes    Overview Addendum 8/7/2017  2:20 PM by Ena Peterson MD     S/P Removal of infected graft; Repair of left superficial femoral artery; Repair of left common femoral artery (7/25/2017 - Dr. Starlett Lefort)             Impaired mobility and ADLs ICD-10-CM: Z74.09  ICD-9-CM: 799.89  7/24/2017 Yes        Infection of vascular bypass graft ([facility] [address] ICD-10-CM: T82. 7XXA  ICD-9-CM: 996.62  7/24/2017 Yes    Overview Signed 8/7/2017  2:19 PM by Ena Peterson MD     Left lower extremity             * (Principal)Sepsis associated with internal vascular access [facility]) ICD-10-CM: T82. 7XXA, A41.9  ICD-9-CM: 996.62, 038.9  7/24/2017 Yes        Anticoagulated on Coumadin (Chronic) ICD-10-CM: Z51.81, Z79.01  ICD-9-CM: V58.83, V58.61  Unknown Yes        Chronic atrial fibrillation (HCC) (Chronic) ICD-10-CM: I48.2  ICD-9-CM: 427.31  Unknown Yes        Benign hypertensive heart disease with systolic congestive heart failure, NYHA class 2 (HCC) (Chronic) ICD-10-CM: I11.0, I50.20  ICD-9-CM: 402.11, 428.20, 428.0  1/26/2015 Yes              Background:   Past Medical History:   Past Medical History:   Diagnosis Date    Acute blood loss as cause of postoperative anemia 4/4/2017    Anticoagulated on Coumadin     [facility]/25/2017    Atherosclerosis of native artery of both lower extremities with intermittent claudication ([facility] [address] 1/25/2017    Benign hypertensive heart disease with systolic congestive heart failure, NYHA class 2 ([facility] [address] 1/26/2015    Carotid artery disease (HCC)     Chronic anemia     Chronic atrial fibrillation (HCC)     Chronic systolic heart failure (HCC)     Chronic ulcer of right foot ([facility] [address] 4/4/2017    Chronic ulcer of right heel ([facility] [address] 8/8/2017    Coronary artery disease involving native coronary artery of native heart 3/15/2017    Successful stenting of Cx (Xience LESLEY) and RCA (Xience LESLEY) to 0% by Dr. Paula Alfaro on 3/15/17.  DDD (degenerative disc disease), lumbar 1/26/2015    Dyslipidemia     Erectile dysfunction 7/5/2016    Euthyroid sick syndrome 4/6/2017    Hereditary peripheral neuropathy 11/15/2016    History of cardioversion 4/18/2017    S/P Synchronized external cardioversion (4/18/2017 - Dr. Evelia Jovel)    History of vitamin D deficiency 4/22/2017    Vitamin D 25-Hydroxy (4/22/2017) = 12.0; Vitamin D 25-Hydroxy (5/26/2017) = 38.7    In[facility] of [facility] 7/24/2017    Left lower extremity    Ischemic cardiomyopathy     Peripheral artery disease ([facility] 1/25/2017    Spinal stenosis of lumbar region with radiculopathy 5/4/2015    Dr. Sandi Hull      Patient taking anticoagulants yes    Patient has a defibrillator: no     Assessment:   Changes in Assessment throughout shift: no     Patient has central line: no Reasons if yes: long term antibiotic  Insertion date:08/02/17 Last dressing date:08/02/17   Patient has Jiang Cath: no Reasons if yes:     Insertion date:     Last Vitals:     Vitals:    08/09/17 2130 08/10/17 0526 08/10/17 0710 08/10/17 1600   BP: 130/66 122/60 152/82 131/65   Pulse: 90 88 64 61   Resp:   18 18   Temp:   96.8 °F (36 °C) 97 °F (36.1 °C)   SpO2:   97% 100%   Weight:            PAIN    Pain Assessment    Pain Intensity 1: 4 (08/10/17 1600) Pain Intensity 1: 2 (12/29/14 1105)    Pain Location 1: Groin Pain Location 1: Abdomen    Pain Intervention(s) 1: Medication (see MAR) Pain Intervention(s) 1: Medication (see MAR)  Patient Stated Pain Goal: 4 Patient Stated Pain Goal: 0  o Intervention effective:no    o Other actions taken for pain: pain medicine     Skin Assessment  Skin color Skin Color: Appropriate for ethnicity  Condition/Temperature Skin Condition/Temp: Dry, Warm  Integrity Skin Integrity: Wound (add Wound LDA)  Turgor Turgor: Non-tenting  Weekly Pressure Ulcer Documentation  Pressure  Injury Documentation: No Pressure Injury Noted-Pressure Ulcer Prevention Initiated  Wound Prevention & Protection Methods  Orientation of wound Orientation of Wound Prevention: Posterior  Location of Prevention Location of Wound Prevention: Sacrum/Coccyx  Dressing Present Dressing Present : No  Dressing Status Dressing Status: Intact  Wound Offloading Wound Offloading (Prevention Methods): Bed, pressure redistribution/air, Chair cushion, Wheelchair     INTAKE/OUPUT    Date 08/09/17 1900 - 08/10/17 0659 08/10/17 0700 - 08/11/17 0659   Shift 3639-7527 24 Hour Total 5111-9376 9925-1186 24 Hour Total   I  N  T  A  K  E   P. O.  780 240  240      P. O.  780 240  240    I.V.  (mL/kg/hr) 100 250         I.V.  100         Volume (ceFAZolin (ANCEF) 2g IVPB in 50 mL D5W) 100 150       Shift Total  (mL/kg) 100  (1.3) 1030  (13.7) 240  (3.2)  240  (3.2)   O  U  T  P  U  T   Urine  (mL/kg/hr) 1925 2375 700  (0.8)  700      Urine Voided 1925 2375 700  700      Urine Occurrence(s) 1 x 4 x 1 x  1 x    Emesis/NG output 0 0         Emesis 0 0       Stool 0 0         Stool Occurrence(s) 0 x 0 x 1 x  1 x      Stool 0 0       Shift Total  (mL/kg) 1925  (25.7) 2375  (31.7) 700  (9.3)  700  (9.3)   NET -1825 -1345 -460  -460   Weight (kg) 75 75 75 75 75       Recommendations:  1. Patient needs and requests: pain medicine    2. Diet: cardiac    3.  Pending tests/procedures: no     4. Functional Level/Equipment: 1 x person assist    5. Estimated Discharge Date: TDB Posted on Whiteboard in Patients Room: no     [facility] Safety Check    A safety check occurred in the patient's room between off going nurse and oncoming nurse listed above. The safety check included the below items  Area Items   H  High Alert Medications - Verify all high alert medication drips (heparin, PCA, etc.)   E  Equipment - Suction is set up for ALL patients (with cailin)  - Red plugs utilized for all equipment (IV pumps, etc.)  - WOWs wiped down at end of shift.  - Room stocked with oxygen, suction, and other unit-specific supplies   A  Alarms - Bed alarm is set for fall risk patients  - Ensure chair alarm is in place and activated if patient is up in a chair   L  Lines - Check IV for any infiltration  - Jiang bag is empty if patient has a Jiang   - Tubing and IV bags are labeled   S  Safety   - Room is clean, patient is clean, and equipment is clean. - Hallways are clear from equipment besides carts. - Fall bracelet on for fall risk patients  - Ensure room is clear and free of clutter  - Suction is set up for ALL patients (with cailin)  - Hallways are clear from equipment besides carts.    - Isolation precautions followed, supplies available outside room, sign posted

## 2017-08-11 LAB
INR PPP: 3 (ref 0.8–1.2)
PROTHROMBIN TIME: 29.4 SEC (ref 11.5–15.2)

## 2017-08-11 PROCEDURE — 97110 THERAPEUTIC EXERCISES: CPT

## 2017-08-11 PROCEDURE — 97530 THERAPEUTIC ACTIVITIES: CPT

## 2017-08-11 PROCEDURE — 74011250636 HC RX REV CODE- 250/636: Performed by: INTERNAL MEDICINE

## 2017-08-11 PROCEDURE — 74011250637 HC RX REV CODE- 250/637: Performed by: INTERNAL MEDICINE

## 2017-08-11 PROCEDURE — 85610 PROTHROMBIN TIME: CPT | Performed by: INTERNAL MEDICINE

## 2017-08-11 PROCEDURE — 77030011256 HC DRSG MEPILEX <16IN NO BORD MOLN -A

## 2017-08-11 PROCEDURE — 97116 GAIT TRAINING THERAPY: CPT

## 2017-08-11 PROCEDURE — 97542 WHEELCHAIR MNGMENT TRAINING: CPT

## 2017-08-11 PROCEDURE — 65310000000 HC RM PRIVATE REHAB

## 2017-08-11 RX ADMIN — GABAPENTIN 300 MG: 300 CAPSULE ORAL at 13:22

## 2017-08-11 RX ADMIN — OXYCODONE HYDROCHLORIDE 15 MG: 5 TABLET ORAL at 20:18

## 2017-08-11 RX ADMIN — ZINC SULFATE 220 MG (50 MG) CAPSULE 220 MG: CAPSULE at 08:11

## 2017-08-11 RX ADMIN — POTASSIUM CHLORIDE 20 MEQ: 20 TABLET, EXTENDED RELEASE ORAL at 18:00

## 2017-08-11 RX ADMIN — POTASSIUM CHLORIDE 20 MEQ: 20 TABLET, EXTENDED RELEASE ORAL at 08:11

## 2017-08-11 RX ADMIN — OXYCODONE HYDROCHLORIDE 20 MG: 5 TABLET ORAL at 08:11

## 2017-08-11 RX ADMIN — CEFAZOLIN SODIUM 2 G: 2 SOLUTION INTRAVENOUS at 20:11

## 2017-08-11 RX ADMIN — Medication 400 MG: at 08:11

## 2017-08-11 RX ADMIN — CEFAZOLIN SODIUM 2 G: 2 SOLUTION INTRAVENOUS at 12:00

## 2017-08-11 RX ADMIN — OXYCODONE HYDROCHLORIDE 15 MG: 5 TABLET ORAL at 04:52

## 2017-08-11 RX ADMIN — SODIUM CHLORIDE TAB 1 GM 1 G: 1 TAB at 17:20

## 2017-08-11 RX ADMIN — AMIODARONE HYDROCHLORIDE 200 MG: 200 TABLET ORAL at 08:10

## 2017-08-11 RX ADMIN — POLYETHYLENE GLYCOL 3350 17 G: 17 POWDER, FOR SOLUTION ORAL at 18:00

## 2017-08-11 RX ADMIN — OXYCODONE HYDROCHLORIDE 20 MG: 5 TABLET ORAL at 12:20

## 2017-08-11 RX ADMIN — Medication 1 CAPSULE: at 08:11

## 2017-08-11 RX ADMIN — DILTIAZEM HYDROCHLORIDE 30 MG: 30 TABLET, FILM COATED ORAL at 06:34

## 2017-08-11 RX ADMIN — GABAPENTIN 300 MG: 300 CAPSULE ORAL at 21:28

## 2017-08-11 RX ADMIN — SODIUM CHLORIDE TAB 1 GM 1 G: 1 TAB at 08:11

## 2017-08-11 RX ADMIN — METOPROLOL TARTRATE 12.5 MG: 25 TABLET ORAL at 20:20

## 2017-08-11 RX ADMIN — DILTIAZEM HYDROCHLORIDE 30 MG: 30 TABLET, FILM COATED ORAL at 21:27

## 2017-08-11 RX ADMIN — Medication 250 MG: at 08:11

## 2017-08-11 RX ADMIN — DOCUSATE SODIUM 100 MG: 100 CAPSULE, LIQUID FILLED ORAL at 08:10

## 2017-08-11 RX ADMIN — DILTIAZEM HYDROCHLORIDE 30 MG: 30 TABLET, FILM COATED ORAL at 17:20

## 2017-08-11 RX ADMIN — CYANOCOBALAMIN TAB 1000 MCG 1000 MCG: 1000 TAB at 08:11

## 2017-08-11 RX ADMIN — CEFAZOLIN SODIUM 2 G: 2 SOLUTION INTRAVENOUS at 04:54

## 2017-08-11 RX ADMIN — FUROSEMIDE 20 MG: 20 TABLET ORAL at 05:59

## 2017-08-11 RX ADMIN — DILTIAZEM HYDROCHLORIDE 30 MG: 30 TABLET, FILM COATED ORAL at 12:20

## 2017-08-11 RX ADMIN — LOSARTAN POTASSIUM 50 MG: 50 TABLET ORAL at 06:02

## 2017-08-11 RX ADMIN — DOCUSATE SODIUM 100 MG: 100 CAPSULE, LIQUID FILLED ORAL at 17:20

## 2017-08-11 RX ADMIN — GABAPENTIN 300 MG: 300 CAPSULE ORAL at 05:59

## 2017-08-11 RX ADMIN — DULOXETINE HYDROCHLORIDE 60 MG: 30 CAPSULE, DELAYED RELEASE ORAL at 08:10

## 2017-08-11 RX ADMIN — METOPROLOL TARTRATE 12.5 MG: 25 TABLET ORAL at 08:11

## 2017-08-11 RX ADMIN — ATORVASTATIN CALCIUM 40 MG: 40 TABLET, FILM COATED ORAL at 20:21

## 2017-08-11 RX ADMIN — VITAMIN D, TAB 1000IU (100/BT) 1000 UNITS: 25 TAB at 08:10

## 2017-08-11 RX ADMIN — ASPIRIN 81 MG 81 MG: 81 TABLET ORAL at 08:10

## 2017-08-11 RX ADMIN — OXYCODONE HYDROCHLORIDE 20 MG: 5 TABLET ORAL at 15:53

## 2017-08-11 RX ADMIN — COLLAGENASE SANTYL: 250 OINTMENT TOPICAL at 08:11

## 2017-08-11 NOTE — ROUTINE PROCESS
SHIFT CHANGE NOTE FOR [facility]    Bedside and Verbal shift change report given to [facility] (oncoming nurse) by Vish Carroll LPN   (offgoing nurse). Report included the following information SBAR, Kardex, MAR and Recent Results. Situation:   Code Status: Full Code   Reason for Admission: sepsis  Hospital Day: 4   Problem List:   Hospital Problems  Date Reviewed: 8/10/2017          Codes Class Noted POA    Chronic ulcer of right heel ([facility] [address] (Chronic) ICD-10-CM: L97.419  ICD-9-CM: 707.14  8/8/2017 Yes        Acute blood loss as cause of postoperative anemia ICD-10-CM: D62  ICD-9-CM: 285.1  7/25/2017 Yes        Aftercare following surgery of the circulatory system ICD-10-CM: Z48.812  ICD-9-CM: V58.73  7/25/2017 Yes    Overview Addendum 8/7/2017  2:20 PM by Zachery Atkins MD     S/P Removal of infected graft; Repair of left superficial femoral artery; Repair of left common femoral artery (7/25/2017 - Dr. Kody Daugherty)             Impaired mobility and ADLs ICD-10-CM: Z74.09  ICD-9-CM: 799.89  7/24/2017 Yes        [facility] ICD-10-CM: T82. 7XXA  ICD-9-CM: 996.62  7/24/2017 Yes    Overview Signed 8/7/2017  2:19 PM by Zachery Atkins MD     Left lower extremity             * (Principal)Sepsis associated with internal vascular access [facility]) ICD-10-CM: T82. 7XXA, A41.9  ICD-9-CM: 996.62, 038.9  7/24/2017 Yes        Anticoagulated on Coumadin (Chronic) ICD-10-CM: Z51.81, Z79.01  ICD-9-CM: V58.83, V58.61  Unknown Yes        Chronic atrial fibrillation (HCC) (Chronic) ICD-10-CM: I48.2  ICD-9-CM: 427.31  Unknown Yes        Benign hypertensive heart disease with systolic congestive heart failure, NYHA class 2 (HCC) (Chronic) ICD-10-CM: I11.0, I50.20  ICD-9-CM: 402.11, 428.20, 428.0  1/26/2015 Yes              Background:   Past Medical History:   Past Medical History:   Diagnosis Date    Acute blood loss as cause of postoperative anemia 4/4/2017    Anticoagulated on Coumadin     Aortoiliac occlusive disease ([facility] [address] 1/25/2017    Atherosclerosis of native artery of both lower extremities with intermittent claudication ([facility] [address] 1/25/2017    Benign hypertensive heart disease with systolic congestive heart failure, NYHA class 2 ([facility] [address] 1/26/2015    Carotid artery disease (HCC)     Chronic anemia     Chronic atrial fibrillation (HCC)     Chronic systolic heart failure (HCC)     Chronic ulcer of right foot ([address] 4/4/2017    Chronic ulcer of right heel ([facility] [address] 8/8/2017    Coronary artery disease involving native coronary artery of native heart 3/15/2017    Successful stenting of Cx (Xience LESLEY) and RCA (Xience LESELY) to 0% by Dr. Luis Alberto Vasquez on 3/15/17.  DDD (degenerative disc disease), lumbar 1/26/2015    Dyslipidemia     Erectile dysfunction 7/5/2016    Euthyroid sick syndrome 4/6/2017    Hereditary peripheral neuropathy 11/15/2016    History of cardioversion 4/18/2017    S/P Synchronized external cardioversion (4/18/2017 - Dr. Debi Pardo)    History of vitamin D deficiency 4/22/2017    Vitamin D 25-Hydroxy (4/22/2017) = 12.0; Vitamin D 25-Hydroxy (5/26/2017) = 38.7    Infection of vascular bypass graft ([facility] [address] 7/24/2017    Left lower extremity    Ischemic cardiomyopathy     Peripheral artery disease ([facility] [address] 1/25/2017    Spinal stenosis of lumbar region with radiculopathy 5/4/2015    Dr. Whit Bolaños      Patient taking anticoagulants yes   Patient has a defibrillator: no     Assessment:   Changes in Assessment throughout shift: no     Patient has central line: yes Reasons if yes: long term antibiotic  Insertion date:8/2/17 Last dressing date:8/2/17   Patient has Jiang Cath: no Reasons if yes:     Insertion date:     Last Vitals:     Vitals:    08/10/17 0710 08/10/17 1600 08/10/17 2054 08/11/17 0559   BP: 152/82 131/65 132/68 147/72   Pulse: 64 61 66 62   Resp: 18 18 18    Temp: 96.8 °F (36 °C) 97 °F (36.1 °C) 97.6 °F (36.4 °C)    SpO2: 97% 100% 98%    Weight:            PAIN    Pain Assessment    Pain Intensity 1: 8 (08/11/17 0530) Pain Intensity 1: 2 (12/29/14 1105)    Pain Location 1: Foot Pain Location 1: Abdomen    Pain Intervention(s) 1: Medication (see MAR) Pain Intervention(s) 1: Medication (see MAR)  Patient Stated Pain Goal: 0 Patient Stated Pain Goal: 0  o Intervention effective: no    o Other actions taken for pain: pain medicine     Skin Assessment  Skin color Skin Color: Appropriate for ethnicity  Condition/Temperature Skin Condition/Temp: Dry, Warm  Integrity Skin Integrity: Wound (add Wound LDA)  Turgor Turgor: Non-tenting  Weekly Pressure Ulcer Documentation  Pressure  Injury Documentation: No Pressure Injury Noted-Pressure Ulcer Prevention Initiated  Wound Prevention & Protection Methods  Orientation of wound Orientation of Wound Prevention: Posterior  Location of Prevention Location of Wound Prevention: Buttocks, Sacrum/Coccyx  Dressing Present Dressing Present : No  Dressing Status Dressing Status: Intact  Wound Offloading Wound Offloading (Prevention Methods): Bed, pressure redistribution/air     INTAKE/OUPUT    Date 08/10/17 0700 - 08/11/17 0659 08/11/17 0700 - 08/12/17 0659   Shift 8580-9649 5670-9388 24 Hour Total 3302-1901 0075-7392 24 Hour Total   I  N  T  A  K  E   P.O. 240  240         P. O. 240  240       Shift Total  (mL/kg) 240  (3.2)  240  (3.2)      O  U  T  P  U  T   Urine  (mL/kg/hr) 700  (0.8)  700  (0.4)         Urine Voided 700  700         Urine Occurrence(s) 1 x 6 x 7 x       Stool            Stool Occurrence(s) 1 x 0 x 1 x       Shift Total  (mL/kg) 700  (9.3)  700  (9.3)      NET -460  -460      Weight (kg) 75 75 75 75 75 75       Recommendations:  1. Patient needs and requests: pain medicine    2. Diet: Cardiac    3. Pending tests/procedures: no     4. Functional Level/Equipment: 1 x person assist    5.  Estimated Discharge Date: TDB Posted on Whiteboard in Patients Room: no     [facility] Safety Check    A safety check occurred in the patient's room between off going nurse and oncoming nurse listed above. The safety check included the below items  Area Items   H  High Alert Medications - Verify all high alert medication drips (heparin, PCA, etc.)   E  Equipment - Suction is set up for ALL patients (with cailin)  - Red plugs utilized for all equipment (IV pumps, etc.)  - WOWs wiped down at end of shift.  - Room stocked with oxygen, suction, and other unit-specific supplies   A  Alarms - Bed alarm is set for fall risk patients  - Ensure chair alarm is in place and activated if patient is up in a chair   L  Lines - Check IV for any infiltration  - Jiang bag is empty if patient has a Jiang   - Tubing and IV bags are labeled   S  Safety   - Room is clean, patient is clean, and equipment is clean. - Hallways are clear from equipment besides carts. - Fall bracelet on for fall risk patients  - Ensure room is clear and free of clutter  - Suction is set up for ALL patients (with cailin)  - Hallways are clear from equipment besides carts.    - Isolation precautions followed, supplies available outside room, sign posted

## 2017-08-11 NOTE — PROGRESS NOTES
Problem: Self Care Deficits Care Plan (Adult)  Goal: *Acute Goals and Plan of Care (Insert Text)  Long Term Goals (to be met upon discharge date) in order to increase pts functional independence and safety, and decrease burden of care:  1. Pt will perform grooming with independence. 2. Pt will perform UB bathing with modified independence. 3. Pt will perform LB bathing with modified independence. 4. Pt will perform tub/shower transfer with modified independence. 5. Pt will perform UB dressing with independence. 6. Pt will perform LB dressing with modified independence. 7. Pt will perform toileting task with modified independence. 8. Pt will perform toilet transfer with modified independence. Short Term Weekly Goals for (2017 to 8/15/2017) in order to increase pts functional independence and safety, and decrease burden of care:   1. Pt will perform grooming with independence. 2. Pt will perform UB bathing with modified independence. 3. Pt will perform LB bathing with supervision. 4. Pt will perform tub/shower transfer with Min A.  5. Pt will perform UB dressing with independence. 6. Pt will perform LB dressing with Min A.  7. Pt will perform toileting task with Min A.  8. Pt will perform toilet transfer with Min A. Outcome: Progressing Towards Goal  OCCUPATIONAL THERAPY DAILY NOTE  Patient Name:Mj Ford     Time In: 0930  Time Out: 1030     Time In: 1430  Time Out: 1500     Medical Diagnosis:  Sepsis  Sepsis associated with internal vascular access, subsequent encounter ([facility] [address] Sepsis associated with internal vascular access ([facility] [address]      Pain at start of tx:8/10  Pain at stop of tx:9/10; pain in LLE calf     Pain at start of tx:7/10  Pain at stop of tx:9/10; pain in LLE calf     Patient identified with name and :yes  Subjective: \"I'm ready to go home. \"     Objective:      THERAPEUTIC ACTIVITY Daily Assessment     During first session, pt participated in tabletop activity in standing to challenge static standing balance. Pt played the game Sorry, standing for 2 trials (7:50, 6:48). Pt utilized FWW for balance during activity. Pt limited on time in standing 2/2 pain in LLE (calf). During second session, pt participated in tabletop activity in standing to challenge static standing balance and to improve activity tolerance for carryover to functional tasks and functional mobility. Pt played the game Sorry and stood for 2 trials (6:18, 4:42) having to lift LLE during activity 2/2 pain in LLE. Pt utilized FWW for balance during activity. THERAPEUTIC EXERCISE Daily Assessment     During first session, pt utilized arm cycle for 20 minutes to improve cardio endurance for carryover to participation with ADLs and functional tasks. Pt required one short rest break during exercise. Assessment: Pt demonstrated improved endurance during therapeutic activities, however is limited in activities 2/2 LLE weightbearing tolerance. Plan of Care: Continue POC to maximize safety and independence with ADLs and functional tasks.      [facility], [place]/  8/11/2017

## 2017-08-11 NOTE — PROGRESS NOTES
Problem: Mobility Impaired (Adult and Pediatric)  Goal: *Acute Goals and Plan of Care (Insert Text)  Physical Therapy Goals  Short Term Goals  Initiated 8/8/2017 and to be accomplished within 5-7 day(s) (8/15/2017)  1. Patient will move from supine to sit and sit to supine , scoot up and down and roll side to side in bed with modified independence. 2. Patient will transfer from bed to chair and chair to bed with contact guard assist using the least restrictive device. 3. Patient will perform sit to stand with contact guard assist.  4. Patient will ambulate with contact guard assist for 150 feet with the least restrictive device. 5. Patient will ascend/descend 4-6 stairs with B handrail(s) with minimal assistance/contact guard assist.    Long Term Goals  Initiated 8/8/2017 and to be accomplished within 10-14 day(s) (8/22/2017)  1. Patient will move from supine to sit and sit to supine , scoot up and down and roll side to side in bed with independence. 2. Patient will transfer from bed to chair and chair to bed with modified independence using the least restrictive device. 3. Patient will perform sit to stand with modified independence. 4. Patient will ambulate with modified independence for 300 feet with the least restrictive device. 5. Patient will ascend/descend 12 stairs with B handrail(s) with modified independence. PHYSICAL THERAPY DAILY NOTE  Patient Name:Mj Orozco  Time In: 1100  Time Out: 1200  Patient Seen For: Balance activities;Gait training;Patient education;Transfer training   Time In: 1332  Time Out: 1408  Patient Seen For: Balance activities; Patient education; Wheelchair mobility training;Transfer training  Diagnosis: Sepsis  Sepsis associated with internal vascular access, subsequent encounter ([facility] [address] Sepsis associated with internal vascular access [facility])  Precautions: Fall Risk Precautions, wound vac to L groin     Subjective: Pt notes increased pain during today's treatment sessions stating it is 9/10 in his L LE, particularly his shin and inside of his foot. Pt with mepilex to L heel peeling off so PT re-dressed L heel with new 4x4 mepilex. Pt's daughter present during first treatment session and sympathetically attending to her father and conversing with him re: concerns for L LE. Pt states, \"anything if I can keep my leg. \"  Pt appears subdued during first treatment session but returns to gym at start of second treatment session again reporting motivation to continue and \"do anything I can do to get stronger. \"     Pain at start of tx: 9-10/10 L LE (foot/calf)  Pain at stop of tx: 9-10/10 L LE (foot/calf) - per chart pt due for pain medication at 1200  During second treatment session, pt continues to note 9/10 L calf/foot pain but also states, \"it's not too bad. \"       Patient identified with name and : yes         Objective:       TRANSFERS Daily Assessment     Sit to Stand Assistance: Minimal assistancePt with decreased ability to tolerate L LE weight bearing secondary to pain and therefor required min assist for balance and max verbal cues to avoid use of momentum and maintain weight bearing through L LE with sit <> stand as tolerated. During second treatment session, pt participated in sit <> stand training performing 3 sit <> stand transfers w/c <> RW requiring minimal assistance for each transfer with mod to max verbal reminders for B foot placement. Pt demonstrates difficulty with ant weight shift secondary to limited functional ankle DF ROM and tolerance for weight bearing through L LE. After w/c mobility training outdoors, pt performed one final sit <> stand to RW requiring CGA with max manual cues for ant weight shift. Once standing, pt continuously demonstrates increased R LE weight bearing and B LE positioning to compensate for pain.           GAIT Daily Assessment     Amount of Assistance: 4 (Contact guard assistance)  Distance (ft): 75 Feet (ft) x 2 repetitions  Assistive Device: Walker, rolling  Pt ambulates with antalgic gait pattern with decreased L stance time and weight shift requiring close supervision to CGA for safety. STEPS or STAIRS Daily Assessment     NT this session secondary to increased L LE pain. BALANCE Daily Assessment     Sitting - Static: Good (unsupported)  Sitting - Dynamic: Good (unsupported)  Standing - Static: Fair  Standing - Dynamic : Impaired          WHEELCHAIR MOBILITY Daily Assessment         Indoors over tile floor:  Functional Level: 6   Pt propels w/c on unit with modified independence over level surfaces. Outdoors:  Functional Level: 4  Pt propels w/c over concrete of varying grades with supervision and intermittent min assist for remaining on concrete sidewalk path and not veering off onto compliant surface. Pt also requires min assist to negotiate off of small curb. However, pt was able to propel into and out of doorway with threshold with distant supervision and min verbal cuing. Pt propelled w/c x 250 ft outdoors over concrete surfaces. LOWER EXTREMITY EXERCISES Daily Assessment     Extremity: Both  Exercise Type #1: Seated lower extremity strengthening  Sets Performed: 3  Reps Performed: 10   Isometric Hip Add x 5\" holds  Hip Abd/ER with red theraband resistance  2# B LAQ  2# Alt Hip Flex              Assessment: Pt requires increased assistance for functional transfers this session secondary to increased L LE pain and decreased weight bearing tolerance. Plan of Care: Continue with current POC. Discussed with unit MD concerns regarding pt's increased pain last treatment session. MD reported verbally that pt's vascular physician would be meeting with pt to discuss these concerns. Continue to emphasize functional strengthening and safe and efficient movement patterns with mobility as pt tolerates.      Tyra Redd, PT, DPT  8/11/2017

## 2017-08-11 NOTE — PROGRESS NOTES
Order in chart states \"Do not change vac\" Vac to be changed by wound care. Vac was last changed on 8/9/17.

## 2017-08-11 NOTE — PROGRESS NOTES
[address]  [address] [place] [address], [address]     INPATIENT REHABILITATION  DAILY PROGRESS NOTE     Date: 8/11/2017    Name: Richelle Mak Age / Sex: 59 y.o. / male   CSN: [id number] MRN: [id number]   [facility] Date: 8/7/2017 Length of Stay: 4 days     Primary Rehab Diagnosis: Impaired Mobility and ADLs secondary to:  1. S/P Removal of infected graft; Repair of left superficial femoral artery; Repair of left common femoral artery (7/25/2017 - Dr. Hilda Carroll)  2. History of sepsis associated with infection of vascular bypass graft      Subjective:     Patient seen and examined. Blood pressure controlled. Patient's Complaint:   No significant medical complaints    Pain Control: stable, mild-to-moderate joint symptoms intermittently, reasonably well controlled by current meds      Objective:     Vital Signs:  Patient Vitals for the past 24 hrs:   BP Temp Pulse Resp SpO2   08/11/17 0749 149/74 97.2 °F (36.2 °C) 68 18 93 %   08/11/17 0559 147/72 - 62 - -   08/10/17 2054 132/68 97.6 °F (36.4 °C) 66 18 98 %   08/10/17 1600 131/65 97 °F (36.1 °C) 61 18 100 %        Physical Examination:  GENERAL SURVEY: Patient is awake, alert, oriented x 3, sitting comfortably on the chair, not in acute respiratory distress.   HEENT: pale palpebral conjunctivae, anicteric sclerae, no nasoaural discharge, moist oral mucosa  NECK: supple, no jugular venous distention, no palpable lymph nodes  CHEST/LUNGS: symmetrical chest expansion, good air entry, clear breath sounds  HEART: adynamic precordium, good S1 S2, no S3, regular rhythm, no murmurs  ABDOMEN: flat, bowel sounds appreciated, soft, non-tender  EXTREMITIES: (+) WoundVac on left groin, (+) erythema on shin area of left leg, (+) ~1.5 cm x ~1.5 cm ulcer on right heel, (+) <1 cm x <1 cm wound above lateral malleolus of right ankle, pale nailbeds, trace bipedal edema, full and equal pulses, no calf tenderness   NEUROLOGICAL EXAM: The patient is awake, alert and oriented x3, able to answer questions fairly appropriately, able to follow 1 and 2 step commands. Able to tell time from the wall clock. Cranial nerves II-XII are grossly intact. No gross sensory deficit except for numbness from the middle of the left lower leg downwards.   Motor strength is 4+/5 on BUE, 4+/5 on the RLE, 3+/5 on the left hip, 4-/5 on the left knee and left ankle.     Incision(s): healing well, clean, dry, and intact        Current Medications:  Current Facility-Administered Medications   Medication Dose Route Frequency    pneumococcal 23-valent (PNEUMOVAX 23) injection 0.5 mL  0.5 mL IntraMUSCular PRIOR TO DISCHARGE    losartan (COZAAR) tablet 50 mg  50 mg Oral DAILY    sodium chloride tablet 1 g  1 g Oral BID WITH MEALS    magnesium oxide (MAG-OX) tablet 400 mg  400 mg Oral DAILY    collagenase (SANTYL) 250 unit/gram ointment   Topical DAILY    alteplase (CATHFLO) 1 mg in sterile water (preservative free) 1 mL injection  1 mg InterCATHeter PRN    acetaminophen (TYLENOL) tablet 650 mg  650 mg Oral Q4H PRN    docusate sodium (COLACE) capsule 100 mg  100 mg Oral BID    bisacodyl (DULCOLAX) tablet 10 mg  10 mg Oral Q48H PRN    amiodarone (CORDARONE) tablet 200 mg  200 mg Oral DAILY    ascorbic acid (vitamin C) (VITAMIN C) tablet 250 mg  250 mg Oral DAILY WITH BREAKFAST    aspirin chewable tablet 81 mg  81 mg Oral DAILY WITH BREAKFAST    ceFAZolin (ANCEF) 2g IVPB in 50 mL D5W  2 g IntraVENous Q8H    cholecalciferol (VITAMIN D3) tablet 1,000 Units  1,000 Units Oral DAILY    cyanocobalamin tablet 1,000 mcg  1,000 mcg Oral DAILY    dilTIAZem (CARDIZEM) IR tablet 30 mg  30 mg Oral AC&HS    DULoxetine (CYMBALTA) capsule 60 mg  60 mg Oral DAILY    furosemide (LASIX) tablet 20 mg  20 mg Oral DAILY    gabapentin (NEURONTIN) capsule 300 mg  300 mg Oral Q8H    lactobacillus sp. 50 billion cpu (BIO-K PLUS) capsule 1 Cap  1 Cap Oral DAILY    metoprolol tartrate (LOPRESSOR) tablet 12.5 mg  12.5 mg Oral Q12H    oxyCODONE IR (ROXICODONE) tablet 20 mg  20 mg Oral TID    polyethylene glycol (MIRALAX) packet 17 g  17 g Oral DAILY    potassium chloride (K-DUR, KLOR-CON) SR tablet 20 mEq  20 mEq Oral BID    oxyCODONE IR (ROXICODONE) tablet 15-20 mg  15-20 mg Oral Q4H PRN    zinc sulfate (ZINCATE) capsule 220 mg  1 Cap Oral DAILY    atorvastatin (LIPITOR) tablet 40 mg  40 mg Oral QHS    WARFARIN INFORMATION NOTE (COUMADIN)   Other Q24H       Allergies: Allergies   Allergen Reactions    Morphine Other (comments)     Patient gets confused with morphine. Lab/Data Review:  Recent Results (from the past 24 hour(s))   PROTHROMBIN TIME + INR    Collection Time: 08/11/17  5:51 AM   Result Value Ref Range    Prothrombin time 29.4 (H) 11.5 - 15.2 sec    INR 3.0 (H) 0.8 - 1.2         Estimated Glomerular Filtration Rate:  On admission, estimated GFR based on a Creatinine of 0.65 mg/dl:   Using CKD-EPI = 106.5 mL/min/1.73m2   Using MDRD = 133.6 mL/min/1.73m2      Assessment:     Primary Rehabilitation Diagnosis  1. Impaired Mobility and ADLs  2. S/P Removal of infected graft; Repair of left superficial femoral artery; Repair of left common femoral artery (7/25/2017 - Dr. Tatiana White);  History of sepsis associated with infection of vascular bypass graft     Comorbidities       Patient Active Problem List   Diagnosis Code    Benign hypertensive heart disease with systolic congestive heart failure, NYHA class 2  I11.0, I50.20    DDD (degenerative disc disease), lumbar M51.36    Erectile dysfunction N52.9    Spinal stenosis of lumbar region with radiculopathy M48.06, M54.16    Hereditary peripheral neuropathy G60.9    Aortoiliac occlusive disease  I74.09    Atherosclerosis of native artery of both lower extremities with intermittent claudication  I70.213    Peripheral artery disease  I73.9    Coronary artery disease involving native coronary artery of native heart I25.10    Chronic atrial fibrillation  I48.2    Acute blood loss as cause of postoperative anemia D62    Impaired mobility and ADLs Z74.09    Anticoagulated on Coumadin Z51.81, Z79.01    Ischemic cardiomyopathy I25.5    Chronic systolic heart failure  B17.33    Carotid artery disease  I77.9    Hyperammonemia  E72.20    Dyslipidemia E78.5    Euthyroid sick syndrome E07.81    Aftercare following surgery of the circulatory system Z48.812    Status post femoral-popliteal bypass surgery Z95.828    History of cardioversion Z98.890    Critical ischemia of lower extremity I99.8    Chronic ulcer of right foot  L97.519    History of vitamin D deficiency Z86.39    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 Justification for continued stay: Good progression towards established rehabilitation goals. 2. Medical Issues being followed closely:    [x]  Fall and safety precautions     [x]  Wound Care     [x]  Bowel and Bladder Function     [x]  Fluid Electrolyte and Nutrition Balance     [x]  Pain Control      3. Issues that 24 hour rehabilitation nursing is following:    [x]  Fall and safety precautions     [x]  Wound Care     [x]  Bowel and Bladder Function     [x]  Fluid Electrolyte and Nutrition Balance     [x]  Pain Control      [x]  Assistance with and education on in-room safety with transfers to and from the bed, wheelchair, toilet and shower. 4. Acute rehabilitation plan of care:    [x]  Continue current care and rehab. [x]  Physical Therapy           [x]  Occupational Therapy           []  Speech Therapy     []  Hold Rehab until further notice     5. Medications:    [x]  MAR Reviewed     [x]  Continue Present Medications     6. DVT Prophylaxis:      []  Lovenox     []  Unfractionated Heparin     [x]  Coumadin     []  NOAC     []  VASQUEZ Stockings     []  Sequential Compression Device     []  None     7.  Orders: > S/P Removal of infected graft; Repair of left superficial femoral artery; Repair of left common femoral artery (7/25/2017 - Dr. Kody Daugherty);  History of sepsis associated with infection of vascular bypass graft   > (+) numbness from the middle of the left lower leg downwards - improved with activity   > On 8/10/2017, Vascular Surgery ([address] [place] [address]) was informed of the numbness and erythema of the shin of the left leg and she was aware of it; no diagnostic studies for now; Vascular Surgery will be evaluating the patient tomorrow to see if he needs further surgical intervention   > Continue Cefazolin 2 grams IVPB q 8 hr until 9/13/2017    > Acute Postoperative Blood Loss Anemia   > Hgb/Hct (8/8/2017, on admission) = 8.7/27.0   > Anemia work-up showed serum iron 28, TIBC 211, iron % saturation 13, ferritin 1105, reticulocyte count 8.8   > On 8/8/2017:    > Discontinued FeSO4 325 mg PO once daily with breakfast     > Patient was given Epoetin constance 10,000 units SC x 1 dose   > Hgb/Hct (8/10/2017) = 8.7/26.6    > On 8/10/2017, patient was given Epoetin constance 10,000 units SC x 1 dose    > Benign hypertensive heart disease with chronic systolic heart failure   > On 8/8/2017, increased Losartan from 25 mg to 50 mg PO once daily (6AM)   > Continue:    > Diltiazem IR 30 mg PO TID AC and q HS    > Furosemide 20 mg PO once daily (6AM0    > Losartan 50 mg PO once daily (6AM)    > Metoprolol tartrate 12.5 mg PO q 12 hr (9AM, 9PM)    > Paroxysmal atrial fibrillation, presently normal sinus rhythm, anticoagulated on Coumadin   > On 8/8/2017, discontinued Enoxaparin 40 mg SC q 24 hr   > Continue:    > Amiodarone 200 mg PO once daily    > Metoprolol tartrate 12.5 mg PO q 12 hr (9AM, 9PM)   > Target INR = 2 to 3   > INR 3.0   > Patient received no Coumadin last night   > No Coumadin for tonight    > Chronic ulcer of right heel    > Podiatry consult (Dr. Romulo Leonard) called for recommendations on care   > Continue:    > Rigid heel suspension boots on both feet when supine in bed    > Collagenase ointment applied to right heel ulcer once daily, then cover with dry sterile dressing    > Analgesia   > Continue:    > Acetaminophen 650 mg PO q 4 hr PRN for pain level less than 5/10    > Oxycodone 20 mg PO TID (8AM, 12PM, 4PM)    > Oxycodone 15-20 mg PO q 4 hr PRN for pain level greater than 4/10 (from 8PM to 4AM only)     > Na (8/8/2017, on admission) = 129   > On 8/8/2017, patient was started on NaCl 1 gram PO BID    > Mg (8/8/2017, on admission) = 1.6   > On 8/8/2017, patient was started on Mg(OH)2 400 mg PO once daily      8. Patient's progress in rehabilitation and medical issues discussed with the patient. All questions answered to the best of my ability. Care plan discussed with patient and nurse.       Signed:    Chandana Fontaine MD    August 11, 2017

## 2017-08-11 NOTE — ROUTINE PROCESS
SHIFT CHANGE NOTE FOR [facility]    Bedside and Verbal shift change report given to Erika Peralta RN (oncoming nurse) by Mary Ann Ruano RN   (offgoing nurse). Report included the following information SBAR, Kardex, MAR and Recent Results. Situation:   Code Status: Full Code   Reason for Admission: sepsis  Hospital Day: 4   Problem List:   Hospital Problems  Date Reviewed: 8/11/2017          Codes Class Noted POA    Chronic ulcer of right heel ([facility] (Chronic) ICD-10-CM: L97.419  ICD-9-CM: 707.14  8/8/2017 Yes        Acute blood loss as cause of postoperative anemia ICD-10-CM: D62  ICD-9-CM: 285.1  7/25/2017 Yes        Aftercare following surgery of the circulatory system ICD-10-CM: Z48.812  ICD-9-CM: V58.73  7/25/2017 Yes    Overview Addendum 8/7/2017  2:20 PM by Pearlean Felty, MD     S/P Removal of infected graft; Repair of left superficial femoral artery; Repair of left common femoral artery (7/25/2017 - Dr. Tres Jang)             Impaired mobility and ADLs ICD-10-CM: Z74.09  ICD-9-CM: 799.89  7/24/2017 Yes        Infection [facility] graft [facility] ICD-10-CM: T82. 7XXA  ICD-9-CM: 996.62  7/24/2017 Yes    Overview Signed 8/7/2017  2:19 PM by Pearlean Felty, MD     Left lower extremity             * (Principal)Sepsis associated with internal vascular access [facility]) ICD-10-CM: T82. 7XXA, A41.9  ICD-9-CM: 996.62, 038.9  7/24/2017 Yes        Anticoagulated on Coumadin (Chronic) ICD-10-CM: Z51.81, Z79.01  ICD-9-CM: V58.83, V58.61  Unknown Yes        Chronic atrial fibrillation (HCC) (Chronic) ICD-10-CM: I48.2  ICD-9-CM: 427.31  Unknown Yes        Benign hypertensive heart disease with systolic congestive heart failure, NYHA class 2 (HCC) (Chronic) ICD-10-CM: I11.0, I50.20  ICD-9-CM: 402.11, 428.20, 428.0  1/26/2015 Yes              Background:   Past Medical History:   Past Medical History:   Diagnosis Date    Acute blood loss as cause of postoperative anemia 4/4/2017    Anticoagulated on Coumadin  Aortoiliac occlusive disease ([facility] 1/25/2017    Atherosclerosis of native artery of both lower extremities with intermittent claudication ([facility] 1/25/2017    Benign hypertensive heart disease with systolic congestive heart failure, NYHA class 2 ([facility] 1/26/2015    Carotid artery disease (HCC)     Chronic anemia     Chronic atrial fibrillation (HCC)     Chronic systolic heart failure (HCC)     Chronic ulcer of right foot ([address] 4/4/2017    Chronic ulcer of right heel ([facility] 8/8/2017    Coronary artery disease involving native coronary artery of native heart 3/15/2017    Successful stenting of Cx (Xience LESLEY) and RCA (Xience LESLEY) to 0% by Dr. Felix Roman on 3/15/17.  DDD (degenerative disc disease), lumbar 1/26/2015    Dyslipidemia     Erectile dysfunction 7/5/2016    Euthyroid sick syndrome 4/6/2017    Hereditary peripheral neuropathy 11/15/2016    History of cardioversion 4/18/2017    S/P Synchronized external cardioversion (4/18/2017 - Dr. Anne Nguyen)    History of vitamin D deficiency 4/22/2017    Vitamin D 25-Hydroxy (4/22/2017) = 12.0; Vitamin D 25-Hydroxy (5/26/2017) = 38.7    Infection of vascular bypass graft ([facility] 7/24/2017    Left lower extremity    Ischemic cardiomyopathy     Peripheral artery disease ([facility] 1/25/2017    Spinal stenosis of lumbar region with radiculopathy 5/4/2015    Dr. Evelio Lanier      Patient taking anticoagulants yes   Patient has a defibrillator: no     Assessment:   Changes in Assessment throughout shift: no     Patient has central line: yes Reasons if yes: long term antibiotic  Insertion date:8/2/17 Last dressing date:8/2/17   Patient has Jiang Cath: no Reasons if yes:     Insertion date:     Last Vitals:     Vitals:    08/11/17 0559 08/11/17 0749 08/11/17 1220 08/11/17 1601   BP: 147/72 149/74 120/60 129/70   Pulse: 62 68 66 60   Resp:  18  20   Temp:  97.2 °F (36.2 °C)  96.8 °F (36 °C)   SpO2:  93%  100%   Weight:            PAIN    Pain Assessment    Pain Intensity 1: 6 (08/11/17 1528) Pain Intensity 1: 2 (12/29/14 1105)    Pain Location 1: Foot Pain Location 1: Abdomen    Pain Intervention(s) 1: Medication (see MAR) Pain Intervention(s) 1: Medication (see MAR)  Patient Stated Pain Goal: 0 Patient Stated Pain Goal: 0  o Intervention effective: no    o Other actions taken for pain: pain medicine     Skin Assessment  Skin color Skin Color: Appropriate for ethnicity  Condition/Temperature Skin Condition/Temp: Warm  Integrity Skin Integrity: Wound (add Wound LDA)  Turgor Turgor: Non-tenting  Weekly Pressure Ulcer Documentation  Pressure  Injury Documentation: No Pressure Injury Noted-Pressure Ulcer Prevention Initiated  Wound Prevention & Protection Methods  Orientation of wound Orientation of Wound Prevention: Posterior  Location of Prevention Location of Wound Prevention: Sacrum/Coccyx  Dressing Present Dressing Present : No  Dressing Status Dressing Status: Intact  Wound Offloading Wound Offloading (Prevention Methods): Bed, pressure redistribution/air     INTAKE/OUPUT    Date 08/10/17 0700 - 08/11/17 0659 08/11/17 0700 - 08/12/17 0659   Shift 2419-3523 4234-5090 24 Hour Total 0123-4804 2016-3151 24 Hour Total   I  N  T  A  K  E   P.O. 240  240 558  558      P.O. 240  240 558  558    Shift Total  (mL/kg) 240  (3.2)  240  (3.2) 558  (7.4)  558  (7.4)   O  U  T  P  U  T   Urine  (mL/kg/hr) 700  (0.8)  700  (0.4) 1200  1200      Urine Voided 700  700 1200  1200      Urine Occurrence(s) 1 x 6 x 7 x 3 x  3 x    Stool            Stool Occurrence(s) 1 x 0 x 1 x 0 x  0 x    Shift Total  (mL/kg) 700  (9.3)  700  (9.3) 1200  (16)  1200  (16)   NET -460  -460 -642  -642   Weight (kg) 75 75 75 75 75 75       Recommendations:  1. Patient needs and requests: pain medicine    2. Diet: Cardiac    3. Pending tests/procedures: no     4. Functional Level/Equipment: 1 x person assist    5.  Estimated Discharge Date: TDB Posted on Whiteboard in Patients Room: no \[facility]\"" Safety Check    A safety check occurred in the patient's room between off going nurse and oncoming nurse listed above. The safety check included the below items  Area Items   H  High Alert Medications - Verify all high alert medication drips (heparin, PCA, etc.)   E  Equipment - Suction is set up for ALL patients (with cailin)  - Red plugs utilized for all equipment (IV pumps, etc.)  - WOWs wiped down at end of shift.  - Room stocked with oxygen, suction, and other unit-specific supplies   A  Alarms - Bed alarm is set for fall risk patients  - Ensure chair alarm is in place and activated if patient is up in a chair   L  Lines - Check IV for any infiltration  - Jiang bag is empty if patient has a Jiang   - Tubing and IV bags are labeled   S  Safety   - Room is clean, patient is clean, and equipment is clean. - Hallways are clear from equipment besides carts. - Fall bracelet on for fall risk patients  - Ensure room is clear and free of clutter  - Suction is set up for ALL patients (with cailin)  - Hallways are clear from equipment besides carts.    - Isolation precautions followed, supplies available outside room, sign posted

## 2017-08-11 NOTE — PROGRESS NOTES
[x] Psychology  [] Social Work [] Recreational Therapy    INTERVENTION  UNITS/TIME OF SERVICE   Assessment    Supportive Counseling August 10, 2017   Orientation    Discharge Planning    Resource Linkage              Progress/Current Status    Patient attended support group on ARU and actively participated in discussion about his need for rehabilitation and current participation. He seems to feel encouraged by progress already made since admit. Patient reported that he had previously been in therapy on ARU and was therefore able to provide some insight about what to expect on return to home and with continued outpatient recovery efforts. Patient is not identifying any immediate areas of concern for himself, other than continued complaint about LE discomfort. In fact, he is apparently aware of the possibilities of further complication for himself and what this may mean in regard to possible surgery.     Sugey Smith, THE [facility] 8/11/2017 11:25 AM

## 2017-08-12 LAB
ALBUMIN SERPL BCP-MCNC: 2.6 G/DL (ref 3.4–5)
ALBUMIN/GLOB SERPL: 0.5 {RATIO} (ref 0.8–1.7)
ALP SERPL-CCNC: 623 U/L (ref 45–117)
ALT SERPL-CCNC: 11 U/L (ref 16–61)
ANION GAP BLD CALC-SCNC: 9 MMOL/L (ref 3–18)
AST SERPL W P-5'-P-CCNC: 32 U/L (ref 15–37)
BILIRUB SERPL-MCNC: 0.7 MG/DL (ref 0.2–1)
BUN SERPL-MCNC: 12 MG/DL (ref 7–18)
BUN/CREAT SERPL: 14 (ref 12–20)
CALCIUM SERPL-MCNC: 9.1 MG/DL (ref 8.5–10.1)
CHLORIDE SERPL-SCNC: 95 MMOL/L (ref 100–108)
CO2 SERPL-SCNC: 28 MMOL/L (ref 21–32)
CREAT SERPL-MCNC: 0.84 MG/DL (ref 0.6–1.3)
GLOBULIN SER CALC-MCNC: 5 G/DL (ref 2–4)
GLUCOSE SERPL-MCNC: 89 MG/DL (ref 74–99)
INR PPP: 2.3 (ref 0.8–1.2)
POTASSIUM SERPL-SCNC: 4.5 MMOL/L (ref 3.5–5.5)
PROT SERPL-MCNC: 7.6 G/DL (ref 6.4–8.2)
PROTHROMBIN TIME: 24.5 SEC (ref 11.5–15.2)
SODIUM SERPL-SCNC: 132 MMOL/L (ref 136–145)

## 2017-08-12 PROCEDURE — 74011250637 HC RX REV CODE- 250/637: Performed by: INTERNAL MEDICINE

## 2017-08-12 PROCEDURE — 97110 THERAPEUTIC EXERCISES: CPT

## 2017-08-12 PROCEDURE — 65310000000 HC RM PRIVATE REHAB

## 2017-08-12 PROCEDURE — 97530 THERAPEUTIC ACTIVITIES: CPT

## 2017-08-12 PROCEDURE — 74011250636 HC RX REV CODE- 250/636: Performed by: INTERNAL MEDICINE

## 2017-08-12 PROCEDURE — 85610 PROTHROMBIN TIME: CPT | Performed by: INTERNAL MEDICINE

## 2017-08-12 PROCEDURE — 74011250637 HC RX REV CODE- 250/637: Performed by: FAMILY MEDICINE

## 2017-08-12 PROCEDURE — 80053 COMPREHEN METABOLIC PANEL: CPT | Performed by: INTERNAL MEDICINE

## 2017-08-12 PROCEDURE — 97116 GAIT TRAINING THERAPY: CPT

## 2017-08-12 RX ADMIN — DILTIAZEM HYDROCHLORIDE 30 MG: 30 TABLET, FILM COATED ORAL at 22:19

## 2017-08-12 RX ADMIN — ZINC SULFATE 220 MG (50 MG) CAPSULE 220 MG: CAPSULE at 09:18

## 2017-08-12 RX ADMIN — CEFAZOLIN SODIUM 2 G: 2 SOLUTION INTRAVENOUS at 04:04

## 2017-08-12 RX ADMIN — LOSARTAN POTASSIUM 50 MG: 50 TABLET ORAL at 06:22

## 2017-08-12 RX ADMIN — VITAMIN D, TAB 1000IU (100/BT) 1000 UNITS: 25 TAB at 09:19

## 2017-08-12 RX ADMIN — DOCUSATE SODIUM 100 MG: 100 CAPSULE, LIQUID FILLED ORAL at 17:34

## 2017-08-12 RX ADMIN — AMIODARONE HYDROCHLORIDE 200 MG: 200 TABLET ORAL at 09:18

## 2017-08-12 RX ADMIN — CEFAZOLIN SODIUM 2 G: 2 SOLUTION INTRAVENOUS at 20:13

## 2017-08-12 RX ADMIN — POTASSIUM CHLORIDE 20 MEQ: 20 TABLET, EXTENDED RELEASE ORAL at 09:19

## 2017-08-12 RX ADMIN — METOPROLOL TARTRATE 12.5 MG: 25 TABLET ORAL at 09:19

## 2017-08-12 RX ADMIN — OXYCODONE HYDROCHLORIDE 20 MG: 5 TABLET ORAL at 12:04

## 2017-08-12 RX ADMIN — ATORVASTATIN CALCIUM 40 MG: 40 TABLET, FILM COATED ORAL at 20:21

## 2017-08-12 RX ADMIN — CEFAZOLIN SODIUM 2 G: 2 SOLUTION INTRAVENOUS at 13:34

## 2017-08-12 RX ADMIN — Medication 250 MG: at 09:18

## 2017-08-12 RX ADMIN — ASPIRIN 81 MG 81 MG: 81 TABLET ORAL at 09:19

## 2017-08-12 RX ADMIN — DULOXETINE HYDROCHLORIDE 60 MG: 30 CAPSULE, DELAYED RELEASE ORAL at 09:18

## 2017-08-12 RX ADMIN — GABAPENTIN 300 MG: 300 CAPSULE ORAL at 06:24

## 2017-08-12 RX ADMIN — CYANOCOBALAMIN TAB 1000 MCG 1000 MCG: 1000 TAB at 09:20

## 2017-08-12 RX ADMIN — DILTIAZEM HYDROCHLORIDE 30 MG: 30 TABLET, FILM COATED ORAL at 12:04

## 2017-08-12 RX ADMIN — METOPROLOL TARTRATE 12.5 MG: 25 TABLET ORAL at 20:21

## 2017-08-12 RX ADMIN — WARFARIN SODIUM 2.5 MG: 2 TABLET ORAL at 17:34

## 2017-08-12 RX ADMIN — POTASSIUM CHLORIDE 20 MEQ: 20 TABLET, EXTENDED RELEASE ORAL at 17:34

## 2017-08-12 RX ADMIN — Medication 1 CAPSULE: at 09:19

## 2017-08-12 RX ADMIN — SODIUM CHLORIDE TAB 1 GM 1 G: 1 TAB at 09:19

## 2017-08-12 RX ADMIN — DILTIAZEM HYDROCHLORIDE 30 MG: 30 TABLET, FILM COATED ORAL at 17:35

## 2017-08-12 RX ADMIN — OXYCODONE HYDROCHLORIDE 15 MG: 5 TABLET ORAL at 04:02

## 2017-08-12 RX ADMIN — DILTIAZEM HYDROCHLORIDE 30 MG: 30 TABLET, FILM COATED ORAL at 07:02

## 2017-08-12 RX ADMIN — Medication 400 MG: at 09:18

## 2017-08-12 RX ADMIN — SODIUM CHLORIDE TAB 1 GM 1 G: 1 TAB at 17:34

## 2017-08-12 RX ADMIN — OXYCODONE HYDROCHLORIDE 15 MG: 5 TABLET ORAL at 21:39

## 2017-08-12 RX ADMIN — GABAPENTIN 300 MG: 300 CAPSULE ORAL at 22:18

## 2017-08-12 RX ADMIN — FUROSEMIDE 20 MG: 20 TABLET ORAL at 06:22

## 2017-08-12 RX ADMIN — DOCUSATE SODIUM 100 MG: 100 CAPSULE, LIQUID FILLED ORAL at 09:19

## 2017-08-12 RX ADMIN — OXYCODONE HYDROCHLORIDE 20 MG: 5 TABLET ORAL at 17:35

## 2017-08-12 RX ADMIN — OXYCODONE HYDROCHLORIDE 15 MG: 5 TABLET ORAL at 00:07

## 2017-08-12 RX ADMIN — GABAPENTIN 300 MG: 300 CAPSULE ORAL at 13:33

## 2017-08-12 RX ADMIN — OXYCODONE HYDROCHLORIDE 20 MG: 5 TABLET ORAL at 09:19

## 2017-08-12 RX ADMIN — COLLAGENASE SANTYL: 250 OINTMENT TOPICAL at 09:00

## 2017-08-12 NOTE — PROGRESS NOTES
Physical Therapy Goals  Short Term Goals  Initiated 2017 and to be accomplished within 5-7 day(s) (8/15/2017)  1. Patient will move from supine to sit and sit to supine , scoot up and down and roll side to side in bed with modified independence. 2. Patient will transfer from bed to chair and chair to bed with contact guard assist using the least restrictive device. 3. Patient will perform sit to stand with contact guard assist.  4. Patient will ambulate with contact guard assist for 150 feet with the least restrictive device. 5. Patient will ascend/descend 4-6 stairs with B handrail(s) with minimal assistance/contact guard assist.    Long Term Goals  Initiated 2017 and to be accomplished within 10-14 day(s) (2017)  1. Patient will move from supine to sit and sit to supine , scoot up and down and roll side to side in bed with independence. 2. Patient will transfer from bed to chair and chair to bed with modified independence using the least restrictive device. 3. Patient will perform sit to stand with modified independence. 4. Patient will ambulate with modified independence for 300 feet with the least restrictive device. 5. Patient will ascend/descend 12 stairs with B handrail(s) with modified independence. PHYSICAL THERAPY DAILY NOTE  Patient Name:Mj Wen Amber  Time In: 08  Time Out: 0900  Patient Seen For: Balance activities;Gait training;Patient education; Therapeutic exercise;Transfer training  Diagnosis: Sepsis  Sepsis associated with internal vascular access, subsequent encounter ([facility] Sepsis associated with internal vascular access [facility])  Precautions: Fall Risk Precautions, wound vac to L groin    Subjective: \"My pain is a 7-8. I somehow got my leg in between the bed rails last night while I was sleeping and it's been hurting since. I was doing better until then. \"    Pain at start of tx: 7-810  Pain at stop of tx: 10/10    Patient identified with name and : Yes Objective:     GROSS ASSESSMENT Daily Assessment            BED/MAT MOBILITY Daily Assessment    Rolling Right : 5 (Supervision)  Rolling Left : 5 (Supervision)  Supine to Sit : 5 (Supervision)  Sit to Supine : 5 (Supervision)     Pt required moderate verbal cues to perform correct log roll technique with supine to sit and sit to supine mobility. TRANSFERS Daily Assessment    Transfer Type: Other  Other:  (W/C to Bed, Bed to W/C: Stand step with RW)  Transfer Assistance : 4 (Contact guard assistance)  Sit to Stand Assistance: Minimal assistance   Pt participated in sit <> stand training performing 5 sit <> stand from mat with RW, requiring CGA for each transfer with mod to max verbal reminders for B foot placement. Pt demonstrates difficulty secondary to decreased tolerance for weight bearing through L LE. GAIT Daily Assessment    Amount of Assistance: 4 (Contact guard assistance)  Distance (ft): 25 Feet (ft) (25)  Assistive Device: Walker, rolling     Limited ability to ambulate today as patient had difficulty with weightbearing on L LE secondary to increased pain of 10/10. Able to ambulate 25' on flat tile surface with CGA and moderate verbal cues to increase WB on L LE and to take larger steps. Pt displays decreased step length (B) and decreased stance time on L LE.        STEPS or STAIRS Daily Assessment    Steps/Stairs Ambulated (#):  (Unable to attempt secondary to increased L LUE pain, 10/10.)       BALANCE Daily Assessment    Sitting - Static: Good (unsupported)  Sitting - Dynamic: Good (unsupported)  Standing - Static: Fair  Standing - Dynamic : Impaired     Impaired standing dynamic balance secondary to decreased WB on L LE due to 10/10 pain requiring CGA. WHEELCHAIR MOBILITY Daily Assessment    Functional Level: 6       LOWER EXTREMITY EXERCISES Daily Assessment    Extremity: Both  Exercise Type #1: Seated lower extremity strengthening; Other (comment)  Sets Performed: 2 (Seated: Basil Luo, Hip ADD with 5\" hold, Hip ABD w/RTB)  Reps Performed: 10  Level of Assist: Completely independent     Pt able to complete independently with minimal verbal cues to improve posture. Noting rounded shoulders. Decreased ability to DF with L LE. Assessment: Pt requires increased assistance for functional transfers this session secondary to increased L LE pain and decreased weight bearing tolerance. Plan of Care: Continue with current POC.     MIKE Johnson  8/12/2017

## 2017-08-12 NOTE — ROUTINE PROCESS
SHIFT CHANGE NOTE FOR [facility]    Bedside and Verbal shift change report given to Lizy Herrera RN (oncoming nurse) by Estela Harris RN   (offgoing nurse). Report included the following information SBAR, Kardex, MAR and Recent Results. Situation:   Code Status: Full Code   Reason for Admission: sepsis  Hospital Day: 5   Problem List:   Hospital Problems  Date Reviewed: 8/11/2017          Codes Class Noted POA    Chronic ulcer of right heel ([facility] [address] (Chronic) ICD-10-CM: L97.419  ICD-9-CM: 707.14  8/8/2017 Yes        Acute blood loss as cause of postoperative anemia ICD-10-CM: D62  ICD-9-CM: 285.1  7/25/2017 Yes        Aftercare following surgery of the circulatory system ICD-10-CM: Z48.812  ICD-9-CM: V58.73  7/25/2017 Yes    Overview Addendum 8/7/2017  2:20 PM by Gumaro Castellanos MD     S/P Removal of infected graft; Repair of left superficial femoral artery; Repair of left common femoral artery (7/25/2017 - Dr. Linda Hannah)             Impaired mobility and ADLs ICD-10-CM: Z74.09  ICD-9-CM: 799.89  7/24/2017 Yes        [facility] vascular bypass graft [facility] ICD-10-CM: T82. 7XXA  ICD-9-CM: 996.62  7/24/2017 Yes    Overview Signed 8/7/2017  2:19 PM by Gumaro Castellanos MD     Left lower extremity             * (Principal)Sepsis associated with internal vascular access [facility]) ICD-10-CM: T82. 7XXA, A41.9  ICD-9-CM: 996.62, 038.9  7/24/2017 Yes        Anticoagulated on Coumadin (Chronic) ICD-10-CM: Z51.81, Z79.01  ICD-9-CM: V58.83, V58.61  Unknown Yes        Chronic atrial fibrillation (HCC) (Chronic) ICD-10-CM: I48.2  ICD-9-CM: 427.31  Unknown Yes        Benign hypertensive heart disease with systolic congestive heart failure, NYHA class 2 (HCC) (Chronic) ICD-10-CM: I11.0, I50.20  ICD-9-CM: 402.11, 428.20, 428.0  1/26/2015 Yes              Background:   Past Medical History:   Past Medical History:   Diagnosis Date    Acute blood loss as cause of postoperative anemia 4/4/2017    Anticoagulated on Coumadin     Aortoiliac occlusive disease ([facility] [address] 1/25/2017    Atherosclerosis of native artery of both lower extremities with intermittent claudication ([facility] [facility] 1/25/2017    Benign hypertensive heart disease with systolic congestive heart failure, NYHA class 2 ([facility] [facility] 1/26/2015    Carotid artery disease (HCC)     Chronic anemia     Chronic atrial fibrillation (HCC)     Chronic systolic heart failure (HCC)     Chronic ulcer of right foot ([address] 4/4/2017    Chronic ulcer of right heel ([facility] 8/8/2017    Coronary artery disease involving native coronary artery of native heart 3/15/2017    Successful stenting of Cx (Xience LESLEY) and RCA (Xience LESLEY) to 0% by Dr. Imelda De La Cruz on 3/15/17.  DDD (degenerative disc disease), lumbar 1/26/2015    Dyslipidemia     Erectile dysfunction 7/5/2016    Euthyroid sick syndrome 4/6/2017    Hereditary peripheral neuropathy 11/15/2016    History of cardioversion 4/18/2017    S/P Synchronized external cardioversion (4/18/2017 - Dr. Ariane Curiel)    History of vitamin D deficiency 4/22/2017    Vitamin D 25-Hydroxy (4/22/2017) = 12.0; Vitamin D 25-Hydroxy (5/26/2017) = 38.7    Infection of vascular bypass graft ([facility] 7/24/2017    Left lower extremity    Ischemic cardiomyopathy     Peripheral artery disease ([facility] [address] 1/25/2017    Spinal stenosis of lumbar region with radiculopathy 5/4/2015    Dr. Di Nugent      Patient taking anticoagulants yes    Patient has a defibrillator: no     Assessment:   Changes in Assessment throughout shift: no     Patient has central line: yes Reasons if yes: long term antibiotic  Insertion date: 08/02/17 Last dressing date:08/09/17   Patient has Jiang Cath: no Reasons if yes:     Insertion date:no     Last Vitals:     Vitals:    08/12/17 0622 08/12/17 0702 08/12/17 1036 08/12/17 1836   BP: 150/77 147/82 131/73 124/59   Pulse: 62 62 63 62   Resp:   18 18   Temp:   97.1 °F (36.2 °C) 97 °F (36.1 °C)   SpO2:   98% 97%   Weight:            PAIN    Pain Assessment    Pain Intensity 1: 4 (08/12/17 1600) Pain Intensity 1: 2 (12/29/14 1105)    Pain Location 1: Foot Pain Location 1: Abdomen    Pain Intervention(s) 1: Medication (see MAR) Pain Intervention(s) 1: Medication (see MAR)  Patient Stated Pain Goal: 4 Patient Stated Pain Goal: 0  o Intervention effective: no   o Other actions taken for pain: pain medicne     Skin Assessment  Skin color Skin Color: Appropriate for ethnicity  Condition/Temperature Skin Condition/Temp: Warm  Integrity Skin Integrity: Wound (add Wound LDA)  Turgor Turgor: Non-tenting  Weekly Pressure Ulcer Documentation  Pressure  Injury Documentation: No Pressure Injury Noted-Pressure Ulcer Prevention Initiated  Wound Prevention & Protection Methods  Orientation of wound Orientation of Wound Prevention: Posterior  Location of Prevention Location of Wound Prevention: Sacrum/Coccyx  Dressing Present Dressing Present : No  Dressing Status Dressing Status: Intact  Wound Offloading Wound Offloading (Prevention Methods): Bed, pressure redistribution/air, Chair cushion, Wheelchair     INTAKE/OUPUT    Date 08/11/17 0700 - 08/12/17 0659 08/12/17 0700 - 08/13/17 0659   Shift 4979-1620 7012-2009 24 Hour Total 7223-2329 2110-0576 24 Hour Total   I  N  T  A  K  E   P.O. 896  896 560  560      P. O. 896  896 560  560    Shift Total  (mL/kg) 896  (11.9)  896  (11.9) 560  (7.5)  560  (7.5)   O  U  T  P  U  T   Urine  (mL/kg/hr) 1200  (1.3) 3350  (3.7) 4550  (2.5) 1200  1200      Urine Voided 1200 3350 4550 1200  1200      Urine Occurrence(s) 3 x 8 x 11 x 5 x  5 x    Stool            Stool Occurrence(s) 0 x 1 x 1 x 2 x  2 x    Shift Total  (mL/kg) 1200  (16) 3350  (44.7) 4550  (60.7) 1200  (16)  1200  (16)   NET -304 -3350 -3654 -640  -640   Weight (kg) 75 75 75 75 75 75       Recommendations:  1. Patient needs and requests: education    2. Diet: cardiac    3. Pending tests/procedures: no     4. Functional Level/Equipment: 1 x person assist    5.  Estimated Discharge Date: TDB Posted on Whiteboard in Patients Room: no     [facility]    A safety check occurred in the patient's room between off going nurse and oncoming nurse listed above. The safety check included the below items  Area Items   H  High Alert Medications - Verify all high alert medication drips (heparin, PCA, etc.)   E  Equipment - Suction is set up for ALL patients (with cailin)  - Red plugs utilized for all equipment (IV pumps, etc.)  - WOWs wiped down at end of shift.  - Room stocked with oxygen, suction, and other unit-specific supplies   A  Alarms - Bed alarm is set for fall risk patients  - Ensure chair alarm is in place and activated if patient is up in a chair   L  Lines - Check IV for any infiltration  - Jiang bag is empty if patient has a Jiang   - Tubing and IV bags are labeled   S  Safety   - Room is clean, patient is clean, and equipment is clean. - Hallways are clear from equipment besides carts. - Fall bracelet on for fall risk patients  - Ensure room is clear and free of clutter  - Suction is set up for ALL patients (with cailin)  - Hallways are clear from equipment besides carts.    - Isolation precautions followed, supplies available outside room, sign posted

## 2017-08-12 NOTE — PROGRESS NOTES
Progress Note    Patient: Chastity Varghese MRN: [id number]  CSN: [id number]    YOB: 1960  Age: 59 y.o. Sex: male    DOA: 8/7/2017 LOS:  LOS: 5 days                    Subjective:     Primary Rehab Diagnosis: Impaired Mobility and ADLs secondary to:  1. S/P Removal of infected graft; Repair of left superficial femoral artery; Repair of left common femoral artery (7/25/2017 - Dr. Helder Martin)  2. History of sepsis associated with infection of vascular bypass graft      Review of systems  General: No fevers or chills. Cardiovascular: No chest pain or pressure. No palpitations. Pulmonary: No shortness of breath, cough or wheeze. Gastrointestinal: No abdominal pain, nausea, vomiting or diarrhea. Genitourinary: No urinary frequency, urgency, hesitancy or dysuria. Musculoskeletal: Lt foot pain wound VAC Lt groin in place    Neurologic: No headache, numbness, tingling or weakness. Objective:     Physical Exam:  Visit Vitals    /73    Pulse 63    Temp 97.1 °F (36.2 °C)    Resp 18    Wt 75 kg (165 lb 5.5 oz)    SpO2 98%    BMI 23.72 kg/m2        General:         Alert, cooperative, no acute distress    HEENT: NC, Atraumatic. PERRLA, anicteric sclerae. Lungs: CTA Bilaterally. No Wheezing/Rhonchi/Rales. Heart:  Regular  rhythm,  No murmur, No Rubs, No Gallops  Abdomen: Soft, Non distended, Non tender.  +Bowel sounds, no HSM  Extremities: Lt foot  Slight redness slight swelling weak pulse, Lt groin wound VAC   Psych:    Not anxious or agitated. Neurologic:  CN 2-12 grossly intact, Alert and oriented X 3.   No acute neurological                                 Deficits,     Intake and Output:  Current Shift:  08/12 0701 - 08/12 1900  In: 380 [P.O.:380]  Out: 1200 [Urine:1200]  Last three shifts:  08/10 1901 - 08/12 0700  In: 896 [P.O.:896]  Out: 4550 [Urine:4550]    Labs: Results:       Chemistry Recent Labs      08/12/17   0610   GLU  89   NA  132*   K  4.5   CL  95*   CO2  28 BUN  12   CREA  0.84   CA  9.1   AGAP  9   BUCR  14   AP  623*   TP  7.6   ALB  2.6*   GLOB  5.0*   AGRAT  0.5*      CBC w/Diff Recent Labs      08/10/17   0500   HGB  8.7*   HCT  26.6*   PLT  528*      Cardiac Enzymes No results for input(s): CPK, CKND1, LEO in the last 72 hours. No lab exists for component: CKRMB, TROIP   Coagulation Recent Labs      08/12/17   0610  08/11/17   0551   PTP  24.5*  29.4*   INR  2.3*  3.0*       Lipid Panel Lab Results   Component Value Date/Time    Cholesterol, total 157 12/29/2015 07:18 AM    HDL Cholesterol 28 12/29/2015 07:18 AM    LDL, calculated 112.4 12/29/2015 07:18 AM    VLDL, calculated 16.6 12/29/2015 07:18 AM    Triglyceride 83 12/29/2015 07:18 AM    CHOL/HDL Ratio 5.6 12/29/2015 07:18 AM      BNP No results for input(s): BNPP in the last 72 hours.    Liver Enzymes Recent Labs      08/12/17   0610   TP  7.6   ALB  2.6*   AP  623*   SGOT  32      Thyroid Studies Lab Results   Component Value Date/Time    TSH 6.74 08/02/2017 05:41 AM          Procedures/imaging: see electronic medical records for all procedures/Xrays and details which were not copied into this note but were reviewed prior to creation of Plan    Medications:   Current Facility-Administered Medications   Medication Dose Route Frequency    warfarin (COUMADIN) tablet 2.5 mg  2.5 mg Oral ONCE    pneumococcal 23-valent (PNEUMOVAX 23) injection 0.5 mL  0.5 mL IntraMUSCular PRIOR TO DISCHARGE    losartan (COZAAR) tablet 50 mg  50 mg Oral DAILY    sodium chloride tablet 1 g  1 g Oral BID WITH MEALS    magnesium oxide (MAG-OX) tablet 400 mg  400 mg Oral DAILY    collagenase (SANTYL) 250 unit/gram ointment   Topical DAILY    alteplase (CATHFLO) 1 mg in sterile water (preservative free) 1 mL injection  1 mg InterCATHeter PRN    acetaminophen (TYLENOL) tablet 650 mg  650 mg Oral Q4H PRN    docusate sodium (COLACE) capsule 100 mg  100 mg Oral BID    bisacodyl (DULCOLAX) tablet 10 mg  10 mg Oral Q48H PRN    amiodarone (CORDARONE) tablet 200 mg  200 mg Oral DAILY    ascorbic acid (vitamin C) (VITAMIN C) tablet 250 mg  250 mg Oral DAILY WITH BREAKFAST    aspirin chewable tablet 81 mg  81 mg Oral DAILY WITH BREAKFAST    ceFAZolin (ANCEF) 2g IVPB in 50 mL D5W  2 g IntraVENous Q8H    cholecalciferol (VITAMIN D3) tablet 1,000 Units  1,000 Units Oral DAILY    cyanocobalamin tablet 1,000 mcg  1,000 mcg Oral DAILY    dilTIAZem (CARDIZEM) IR tablet 30 mg  30 mg Oral AC&HS    DULoxetine (CYMBALTA) capsule 60 mg  60 mg Oral DAILY    furosemide (LASIX) tablet 20 mg  20 mg Oral DAILY    gabapentin (NEURONTIN) capsule 300 mg  300 mg Oral Q8H    lactobacillus sp. 50 billion cpu (BIO-K PLUS) capsule 1 Cap  1 Cap Oral DAILY    metoprolol tartrate (LOPRESSOR) tablet 12.5 mg  12.5 mg Oral Q12H    oxyCODONE IR (ROXICODONE) tablet 20 mg  20 mg Oral TID    polyethylene glycol (MIRALAX) packet 17 g  17 g Oral DAILY    potassium chloride (K-DUR, KLOR-CON) SR tablet 20 mEq  20 mEq Oral BID    oxyCODONE IR (ROXICODONE) tablet 15-20 mg  15-20 mg Oral Q4H PRN    zinc sulfate (ZINCATE) capsule 220 mg  1 Cap Oral DAILY    atorvastatin (LIPITOR) tablet 40 mg  40 mg Oral QHS    WARFARIN INFORMATION NOTE (COUMADIN)   Other Q24H       Assessment/Plan     Principal Problem:    Sepsis associated with internal vascular access (HCC) (7/24/2017)    Active Problems:    Benign hypertensive heart disease with systolic congestive heart failure, NYHA class 2 ([facility]) (1/26/2015)      Chronic atrial fibrillation ([facility]) ()      Acute blood loss as cause of postoperative anemia (7/25/2017)      Impaired mobility and ADLs (7/24/2017)      Anticoagulated on Coumadin ()      Aftercare following surgery of the circulatory system (7/25/2017)      Overview: S/P Removal of infected graft; Repair of left superficial femoral artery;       Repair of left common femoral artery (7/25/2017 - Dr. Clarita Freeman)      Infection of vascular bypass graft ([facility] (7/24/2017)      Overview: Left lower extremity      Chronic ulcer of right heel ([facility] (8/8/2017)    Plan  Continue coumadin 2.5 mg f/u INR in AM  Vascular surgery  Pending was called yesterday   Continue IV ABT Cefazolin  Wound care Rt heel  Continue to monitor lab cbc, cmp, mag    Yuni Mckeon MD  8/12/2017 4:22 PM

## 2017-08-12 NOTE — ROUTINE PROCESS
SHIFT CHANGE NOTE FOR [facility]    Bedside and Verbal shift change report given to Marcelina Keenan RN (oncoming nurse) by Estefanía Caldera RN   (offgoing nurse). Report included the following information SBAR, Kardex, MAR and Recent Results. Situation:   Code Status: Full Code   Reason for Admission: sepsis  Hospital Day: 5   Problem List:   Hospital Problems  Date Reviewed: 8/11/2017          Codes Class Noted POA    Chronic [facility] of right heel ([facility] (Chronic) ICD-10-CM: L97.419  ICD-9-CM: 707.14  8/8/2017 Yes        Acute blood loss as cause of postoperative anemia ICD-10-CM: D62  ICD-9-CM: 285.1  7/25/2017 Yes        Aftercare following surgery of the circulatory system ICD-10-CM: Z48.812  ICD-9-CM: V58.73  7/25/2017 Yes    Overview Addendum 8/7/2017  2:20 PM by Lionel Price MD     S/P Removal of infected graft; Repair of left superficial femoral artery; Repair of left common femoral artery (7/25/2017 - Dr. Hilda Carroll)             Impaired mobility and ADLs ICD-10-CM: Z74.09  ICD-9-CM: 799.89  7/24/2017 Yes        Infection of vascular bypass graft ([facility] ICD-10-CM: T82. 7XXA  ICD-9-CM: 996.62  7/24/2017 Yes    Overview Signed 8/7/2017  2:19 PM by Lionel Price MD     Left lower extremity             * (Principal)Sepsis associated with internal vascular access [facility]) ICD-10-CM: T82. 7XXA, A41.9  ICD-9-CM: 996.62, 038.9  7/24/2017 Yes        Anticoagulated on Coumadin (Chronic) ICD-10-CM: Z51.81, Z79.01  ICD-9-CM: V58.83, V58.61  Unknown Yes        Chronic atrial fibrillation (HCC) (Chronic) ICD-10-CM: I48.2  ICD-9-CM: 427.31  Unknown Yes        Benign hypertensive heart disease with systolic congestive heart failure, NYHA class 2 (HCC) (Chronic) ICD-10-CM: I11.0, I50.20  ICD-9-CM: 402.11, 428.20, 428.0  1/26/2015 Yes              Background:   Past Medical History:   Past Medical History:   Diagnosis Date    Acute blood loss as cause of postoperative anemia 4/4/2017    Anticoagulated on Coumadin     Aortoiliac occlusive disease ([facility] 1/25/2017    Atherosclerosis of native artery of both lower extremities with intermittent claudication ([address] 1/25/2017    Benign hypertensive heart disease with systolic congestive heart failure, NYHA class 2 ([address] 1/26/2015    Carotid artery disease (HCC)     Chronic anemia     Chronic atrial fibrillation (HCC)     Chronic systolic heart failure (HCC)     Chronic ulcer of right foot ([address] 4/4/2017    Chronic ulcer of right heel ([address] 8/8/2017    Coronary artery disease involving native coronary artery of native heart 3/15/2017    Successful stenting of Cx (Xience LESLEY) and RCA (Xience LESLEY) to 0% by Dr. Lupillo Ling on 3/15/17.  DDD (degenerative disc disease), lumbar 1/26/2015    Dyslipidemia     Erectile dysfunction 7/5/2016    Euthyroid sick syndrome 4/6/2017    Hereditary peripheral neuropathy 11/15/2016    History of cardioversion 4/18/2017    S/P Synchronized external cardioversion (4/18/2017 - Dr. aKtya Vo)    History of vitamin D deficiency 4/22/2017    Vitamin D 25-Hydroxy (4/22/2017) = 12.0; Vitamin D 25-Hydroxy (5/26/2017) = 38.7    Infection of vascular bypass graft ([facility] 7/24/2017    Left lower extremity    Ischemic cardiomyopathy     Peripheral artery disease ([facility] 1/25/2017    Spinal stenosis of lumbar region with radiculopathy 5/4/2015    Dr. Sudhakar Padilla      Patient taking anticoagulants yes   Patient has a defibrillator: no     Assessment:   Changes in Assessment throughout shift: no     Patient has central line: yes Reasons if yes: long term antibiotic  Insertion date:8/2/17 Last dressing date:8/2/17   Patient has Jiang Cath: no Reasons if yes:     Insertion date:     Last Vitals:     Vitals:    08/11/17 2020 08/11/17 2127 08/12/17 0622 08/12/17 0702   BP: 123/65 140/80 150/77 147/82   Pulse: 64 68 62 62   Resp: 18      Temp: 97.6 °F (36.4 °C)      SpO2:       Weight:            PAIN    Pain Assessment    Pain Intensity 1: 0 (08/12/17 1102) Pain Intensity 1: 2 (12/29/14 1105)    Pain Location 1: Foot Pain Location 1: Abdomen    Pain Intervention(s) 1: Medication (see MAR) Pain Intervention(s) 1: Medication (see MAR)  Patient Stated Pain Goal: 0 Patient Stated Pain Goal: 0  o Intervention effective: no    o Other actions taken for pain: pain medicine     Skin Assessment  Skin color Skin Color: Appropriate for ethnicity  Condition/Temperature Skin Condition/Temp: Warm  Integrity Skin Integrity: Wound (add Wound LDA)  Turgor Turgor: Non-tenting  Weekly Pressure Ulcer Documentation  Pressure  Injury Documentation: No Pressure Injury Noted-Pressure Ulcer Prevention Initiated  Wound Prevention & Protection Methods  Orientation of wound Orientation of Wound Prevention: Posterior  Location of Prevention Location of Wound Prevention: Sacrum/Coccyx  Dressing Present Dressing Present : No  Dressing Status Dressing Status: Intact  Wound Offloading Wound Offloading (Prevention Methods): Bed, pressure redistribution/air     INTAKE/OUPUT    Date 08/11/17 0700 - 08/12/17 0659 08/12/17 0700 - 08/13/17 0659   Shift 4012-2992 1800-4086 24 Hour Total 4543-3954 3736-4987 24 Hour Total   I  N  T  A  K  E   P.O. 896  896         P.O. 896  896       Shift Total  (mL/kg) 896  (11.9)  896  (11.9)      O  U  T  P  U  T   Urine  (mL/kg/hr) 1200  (1.3) 3350  (3.7) 4550  (2.5)         Urine Voided 1200 3350 4550         Urine Occurrence(s) 3 x 8 x 11 x       Stool            Stool Occurrence(s) 0 x 1 x 1 x       Shift Total  (mL/kg) 1200  (16) 3350  (44.7) 4550  (60.7)      NET -304 -5124 -7582      Weight (kg) 75 75 75 75 75 75       Recommendations:  1. Patient needs and requests: pain medicine    2. Diet: Cardiac    3. Pending tests/procedures: no     4. Functional Level/Equipment: 1 x person assist    5.  Estimated Discharge Date: TDB Posted on Whiteboard in Patients Room: no     HEALS Safety Check    A safety check occurred in the patient's room between off going nurse and oncoming nurse listed above. The safety check included the below items  Area Items   H  High Alert Medications - Verify all high alert medication drips (heparin, PCA, etc.)   E  Equipment - Suction is set up for ALL patients (with cailin)  - Red plugs utilized for all equipment (IV pumps, etc.)  - WOWs wiped down at end of shift.  - Room stocked with oxygen, suction, and other unit-specific supplies   A  Alarms - Bed alarm is set for fall risk patients  - Ensure chair alarm is in place and activated if patient is up in a chair   L  Lines - Check IV for any infiltration  - Jiang bag is empty if patient has a Jiang   - Tubing and IV bags are labeled   S  Safety   - Room is clean, patient is clean, and equipment is clean. - Hallways are clear from equipment besides carts. - Fall bracelet on for fall risk patients  - Ensure room is clear and free of clutter  - Suction is set up for ALL patients (with cailin)  - Hallways are clear from equipment besides carts.    - Isolation precautions followed, supplies available outside room, sign posted

## 2017-08-13 LAB
ALBUMIN SERPL BCP-MCNC: 2.7 G/DL (ref 3.4–5)
ALBUMIN/GLOB SERPL: 0.6 {RATIO} (ref 0.8–1.7)
ALP SERPL-CCNC: 616 U/L (ref 45–117)
ALT SERPL-CCNC: 11 U/L (ref 16–61)
ANION GAP BLD CALC-SCNC: 9 MMOL/L (ref 3–18)
AST SERPL W P-5'-P-CCNC: 27 U/L (ref 15–37)
BASOPHILS # BLD AUTO: 0 K/UL (ref 0–0.06)
BASOPHILS # BLD: 0 % (ref 0–2)
BILIRUB SERPL-MCNC: 0.8 MG/DL (ref 0.2–1)
BUN SERPL-MCNC: 14 MG/DL (ref 7–18)
BUN/CREAT SERPL: 21 (ref 12–20)
CALCIUM SERPL-MCNC: 9.1 MG/DL (ref 8.5–10.1)
CHLORIDE SERPL-SCNC: 94 MMOL/L (ref 100–108)
CO2 SERPL-SCNC: 27 MMOL/L (ref 21–32)
CREAT SERPL-MCNC: 0.68 MG/DL (ref 0.6–1.3)
DIFFERENTIAL METHOD BLD: ABNORMAL
EOSINOPHIL # BLD: 0.3 K/UL (ref 0–0.4)
EOSINOPHIL NFR BLD: 2 % (ref 0–5)
ERYTHROCYTE [DISTWIDTH] IN BLOOD BY AUTOMATED COUNT: 19.4 % (ref 11.6–14.5)
GLOBULIN SER CALC-MCNC: 4.9 G/DL (ref 2–4)
GLUCOSE SERPL-MCNC: 89 MG/DL (ref 74–99)
HCT VFR BLD AUTO: 29.1 % (ref 36–48)
HGB BLD-MCNC: 9.3 G/DL (ref 13–16)
INR PPP: 2.1 (ref 0.8–1.2)
LYMPHOCYTES # BLD AUTO: 15 % (ref 21–52)
LYMPHOCYTES # BLD: 1.9 K/UL (ref 0.9–3.6)
MCH RBC QN AUTO: 28.6 PG (ref 24–34)
MCHC RBC AUTO-ENTMCNC: 32 G/DL (ref 31–37)
MCV RBC AUTO: 89.5 FL (ref 74–97)
MONOCYTES # BLD: 1.2 K/UL (ref 0.05–1.2)
MONOCYTES NFR BLD AUTO: 9 % (ref 3–10)
NEUTS SEG # BLD: 9.1 K/UL (ref 1.8–8)
NEUTS SEG NFR BLD AUTO: 74 % (ref 40–73)
PLATELET # BLD AUTO: 469 K/UL (ref 135–420)
PMV BLD AUTO: 9.2 FL (ref 9.2–11.8)
POTASSIUM SERPL-SCNC: 4.4 MMOL/L (ref 3.5–5.5)
PROT SERPL-MCNC: 7.6 G/DL (ref 6.4–8.2)
PROTHROMBIN TIME: 22.8 SEC (ref 11.5–15.2)
RBC # BLD AUTO: 3.25 M/UL (ref 4.7–5.5)
SODIUM SERPL-SCNC: 130 MMOL/L (ref 136–145)
WBC # BLD AUTO: 12.4 K/UL (ref 4.6–13.2)

## 2017-08-13 PROCEDURE — 74011250637 HC RX REV CODE- 250/637: Performed by: INTERNAL MEDICINE

## 2017-08-13 PROCEDURE — 97530 THERAPEUTIC ACTIVITIES: CPT

## 2017-08-13 PROCEDURE — 74011250636 HC RX REV CODE- 250/636: Performed by: INTERNAL MEDICINE

## 2017-08-13 PROCEDURE — 97110 THERAPEUTIC EXERCISES: CPT

## 2017-08-13 PROCEDURE — 97116 GAIT TRAINING THERAPY: CPT

## 2017-08-13 PROCEDURE — 85025 COMPLETE CBC W/AUTO DIFF WBC: CPT | Performed by: INTERNAL MEDICINE

## 2017-08-13 PROCEDURE — 74011250637 HC RX REV CODE- 250/637: Performed by: FAMILY MEDICINE

## 2017-08-13 PROCEDURE — 85610 PROTHROMBIN TIME: CPT | Performed by: INTERNAL MEDICINE

## 2017-08-13 PROCEDURE — 80053 COMPREHEN METABOLIC PANEL: CPT | Performed by: INTERNAL MEDICINE

## 2017-08-13 PROCEDURE — 65310000000 HC RM PRIVATE REHAB

## 2017-08-13 RX ORDER — WARFARIN 4 MG/1
4 TABLET ORAL ONCE
Status: COMPLETED | OUTPATIENT
Start: 2017-08-13 | End: 2017-08-13

## 2017-08-13 RX ADMIN — CEFAZOLIN SODIUM 2 G: 2 SOLUTION INTRAVENOUS at 04:00

## 2017-08-13 RX ADMIN — CEFAZOLIN SODIUM 2 G: 2 SOLUTION INTRAVENOUS at 21:53

## 2017-08-13 RX ADMIN — CYANOCOBALAMIN TAB 1000 MCG 1000 MCG: 1000 TAB at 08:37

## 2017-08-13 RX ADMIN — DILTIAZEM HYDROCHLORIDE 30 MG: 30 TABLET, FILM COATED ORAL at 06:39

## 2017-08-13 RX ADMIN — DILTIAZEM HYDROCHLORIDE 30 MG: 30 TABLET, FILM COATED ORAL at 17:38

## 2017-08-13 RX ADMIN — DULOXETINE HYDROCHLORIDE 60 MG: 30 CAPSULE, DELAYED RELEASE ORAL at 08:37

## 2017-08-13 RX ADMIN — Medication 250 MG: at 08:00

## 2017-08-13 RX ADMIN — ASPIRIN 81 MG 81 MG: 81 TABLET ORAL at 08:37

## 2017-08-13 RX ADMIN — GABAPENTIN 300 MG: 300 CAPSULE ORAL at 05:44

## 2017-08-13 RX ADMIN — SODIUM CHLORIDE TAB 1 GM 1 G: 1 TAB at 17:38

## 2017-08-13 RX ADMIN — COLLAGENASE SANTYL: 250 OINTMENT TOPICAL at 08:36

## 2017-08-13 RX ADMIN — METOPROLOL TARTRATE 12.5 MG: 25 TABLET ORAL at 08:38

## 2017-08-13 RX ADMIN — Medication 1 CAPSULE: at 08:37

## 2017-08-13 RX ADMIN — DOCUSATE SODIUM 100 MG: 100 CAPSULE, LIQUID FILLED ORAL at 08:37

## 2017-08-13 RX ADMIN — GABAPENTIN 300 MG: 300 CAPSULE ORAL at 22:00

## 2017-08-13 RX ADMIN — DILTIAZEM HYDROCHLORIDE 30 MG: 30 TABLET, FILM COATED ORAL at 12:38

## 2017-08-13 RX ADMIN — OXYCODONE HYDROCHLORIDE 20 MG: 5 TABLET ORAL at 17:37

## 2017-08-13 RX ADMIN — WARFARIN SODIUM 4 MG: 4 TABLET ORAL at 17:38

## 2017-08-13 RX ADMIN — LOSARTAN POTASSIUM 50 MG: 50 TABLET ORAL at 05:44

## 2017-08-13 RX ADMIN — ZINC SULFATE 220 MG (50 MG) CAPSULE 220 MG: CAPSULE at 08:37

## 2017-08-13 RX ADMIN — FUROSEMIDE 20 MG: 20 TABLET ORAL at 05:44

## 2017-08-13 RX ADMIN — DOCUSATE SODIUM 100 MG: 100 CAPSULE, LIQUID FILLED ORAL at 17:37

## 2017-08-13 RX ADMIN — CEFAZOLIN SODIUM 2 G: 2 SOLUTION INTRAVENOUS at 12:39

## 2017-08-13 RX ADMIN — METOPROLOL TARTRATE 12.5 MG: 25 TABLET ORAL at 20:44

## 2017-08-13 RX ADMIN — AMIODARONE HYDROCHLORIDE 200 MG: 200 TABLET ORAL at 08:37

## 2017-08-13 RX ADMIN — OXYCODONE HYDROCHLORIDE 20 MG: 5 TABLET ORAL at 08:37

## 2017-08-13 RX ADMIN — ATORVASTATIN CALCIUM 40 MG: 40 TABLET, FILM COATED ORAL at 20:43

## 2017-08-13 RX ADMIN — VITAMIN D, TAB 1000IU (100/BT) 1000 UNITS: 25 TAB at 08:37

## 2017-08-13 RX ADMIN — DILTIAZEM HYDROCHLORIDE 30 MG: 30 TABLET, FILM COATED ORAL at 22:00

## 2017-08-13 RX ADMIN — POTASSIUM CHLORIDE 20 MEQ: 20 TABLET, EXTENDED RELEASE ORAL at 17:38

## 2017-08-13 RX ADMIN — SODIUM CHLORIDE TAB 1 GM 1 G: 1 TAB at 08:37

## 2017-08-13 RX ADMIN — GABAPENTIN 300 MG: 300 CAPSULE ORAL at 13:28

## 2017-08-13 RX ADMIN — POTASSIUM CHLORIDE 20 MEQ: 20 TABLET, EXTENDED RELEASE ORAL at 08:37

## 2017-08-13 RX ADMIN — OXYCODONE HYDROCHLORIDE 20 MG: 5 TABLET ORAL at 12:38

## 2017-08-13 RX ADMIN — Medication 400 MG: at 08:37

## 2017-08-13 NOTE — ROUTINE PROCESS
SHIFT CHANGE NOTE FOR [facility]    Bedside and Verbal shift change report given to Rohan Corral RN (oncoming nurse) by Diane Lopes RN   (offgoing nurse). Report included the following information SBAR, Kardex, MAR and Recent Results. Situation:   Code Status: Full Code   Reason for Admission: sepsis  Hospital Day: 6   Problem List:   Hospital Problems  Date Reviewed: 8/11/2017          Codes Class Noted POA    Chronic ulcer of right heel ([facility] (Chronic) ICD-10-CM: L97.419  ICD-9-CM: 707.14  8/8/2017 Yes        Acute blood loss as cause of postoperative anemia ICD-10-CM: D62  ICD-9-CM: 285.1  7/25/2017 Yes        Aftercare following surgery of the circulatory system ICD-10-CM: Z48.812  ICD-9-CM: V58.73  7/25/2017 Yes    Overview Addendum 8/7/2017  2:20 PM by Chandrakant Hernandez MD     S/P Removal of infected graft; Repair of left superficial femoral artery; Repair of left common femoral artery (7/25/2017 - Dr. Francisco Armas)             Impaired mobility and ADLs ICD-10-CM: Z74.09  ICD-9-CM: 799.89  7/24/2017 Yes        Infection of vascular bypass graft ([facility] ICD-10-CM: T82. 7XXA  ICD-9-CM: 996.62  7/24/2017 Yes    Overview Signed 8/7/2017  2:19 PM by Chandrakant Hernandez MD     Left lower extremity             * (Principal)Sepsis associated with internal vascular access [facility]) ICD-10-CM: T82. 7XXA, A41.9  ICD-9-CM: 996.62, 038.9  7/24/2017 Yes        Anticoagulated on Coumadin (Chronic) ICD-10-CM: Z51.81, Z79.01  ICD-9-CM: V58.83, V58.61  Unknown Yes        Chronic atrial fibrillation (HCC) (Chronic) ICD-10-CM: I48.2  ICD-9-CM: 427.31  Unknown Yes        Benign hypertensive heart disease with systolic congestive heart failure, NYHA class 2 (HCC) (Chronic) ICD-10-CM: I11.0, I50.20  ICD-9-CM: 402.11, 428.20, 428.0  1/26/2015 Yes              Background:   Past Medical History:   Past Medical History:   Diagnosis Date    Acute blood loss as cause of postoperative anemia 4/4/2017    Anticoagulated on Coumadin     Aortoiliac occlusive disease ([facility] 1/25/2017    Atherosclerosis of native artery of both lower extremities with intermittent claudication ([facility] 1/25/2017    Benign hypertensive heart disease with systolic congestive heart failure, NYHA class 2 ([address] 1/26/2015    Carotid artery disease (HCC)     Chronic anemia     Chronic atrial fibrillation (HCC)     Chronic systolic heart failure (HCC)     Chronic ulcer of right foot ([address] 4/4/2017    Chronic ulcer of right heel ([address] 8/8/2017    Coronary artery disease involving native coronary artery of native heart 3/15/2017    Successful stenting of Cx (Xience LESLEY) and RCA (Xience LESLEY) to 0% by Dr. Timur Ayala on 3/15/17.  DDD (degenerative disc disease), lumbar 1/26/2015    Dyslipidemia     Erectile dysfunction 7/5/2016    Euthyroid sick syndrome 4/6/2017    Hereditary peripheral neuropathy 11/15/2016    History of cardioversion 4/18/2017    S/P Synchronized external cardioversion (4/18/2017 - Dr. Bee Stout)    History of vitamin D deficiency 4/22/2017    Vitamin D 25-Hydroxy (4/22/2017) = 12.0; Vitamin D 25-Hydroxy (5/26/2017) = 38.7    Infection of vascular bypass graft ([facility] 7/24/2017    Left lower extremity    Ischemic cardiomyopathy     Peripheral artery disease ([facility] 1/25/2017    Spinal stenosis of lumbar region with radiculopathy 5/4/2015    Dr. Faizan Goode      Patient taking anticoagulants yes    Patient has a defibrillator: no     Assessment:   Changes in Assessment throughout shift: no     Patient has central line: yes Reasons if yes: long term antibiotic  Insertion date: 08/02/17 Last dressing date:08/09/17   Patient has Jiang Cath: no Reasons if yes:     Insertion date:no     Last Vitals:     Vitals:    08/12/17 2021 08/12/17 2218 08/13/17 0544 08/13/17 0639   BP: 141/78 137/79 148/81 141/76   Pulse: 65 65 66 64   Resp:       Temp:       SpO2:       Weight:            PAIN    Pain Assessment    Pain Intensity 1: 0 (08/13/17 0400) Pain Intensity 1: 2 (12/29/14 1105)    Pain Location 1: Foot Pain Location 1: Abdomen    Pain Intervention(s) 1: Medication (see MAR) Pain Intervention(s) 1: Medication (see MAR)  Patient Stated Pain Goal: 0 Patient Stated Pain Goal: 0  o Intervention effective: no   o Other actions taken for pain: pain medicne     Skin Assessment  Skin color Skin Color: Appropriate for ethnicity  Condition/Temperature Skin Condition/Temp: Dry, Warm  Integrity Skin Integrity: Wound (add Wound LDA)  Turgor Turgor: Non-tenting  Weekly Pressure Ulcer Documentation  Pressure  Injury Documentation: No Pressure Injury Noted-Pressure Ulcer Prevention Initiated  Wound Prevention & Protection Methods  Orientation of wound Orientation of Wound Prevention: Posterior  Location of Prevention Location of Wound Prevention: Sacrum/Coccyx  Dressing Present Dressing Present : No  Dressing Status Dressing Status: Intact  Wound Offloading Wound Offloading (Prevention Methods): Bed, pressure redistribution/air     INTAKE/OUPUT    Date 08/12/17 0700 - 08/13/17 0659 08/13/17 0700 - 08/14/17 0659   Shift 5455-8158 1217-7752 24 Hour Total 2771-4637 3765-8559 24 Hour Total   I  N  T  A  K  E   P. O. 560  560         P. O. 560  560       Shift Total  (mL/kg) 560  (7.5)  560  (7.5)      O  U  T  P  U  T   Urine  (mL/kg/hr) 1200  (1.3) 4550  (5.1) 5750  (3.2)         Urine Voided 1200 4550 5750         Urine Occurrence(s) 5 x 11 x 16 x       Stool            Stool Occurrence(s) 2 x 1 x 3 x       Shift Total  (mL/kg) 1200  (16) 4550  (60.7) 5750  (76.7)      NET -640 -4550 -5190      Weight (kg) 75 75 75 75 75 75       Recommendations:  1. Patient needs and requests: education    2. Diet: cardiac    3. Pending tests/procedures: no     4. Functional Level/Equipment: 1 x person assist    5.  Estimated Discharge Date: TDB Posted on Whiteboard in Patients Room: no     HEALS Safety Check    A safety check occurred in the patient's room between off going nurse and oncoming nurse listed above. The safety check included the below items  Area Items   H  High Alert Medications - Verify all high alert medication drips (heparin, PCA, etc.)   E  Equipment - Suction is set up for ALL patients (with cailin)  - Red plugs utilized for all equipment (IV pumps, etc.)  - WOWs wiped down at end of shift.  - Room stocked with oxygen, suction, and other unit-specific supplies   A  Alarms - Bed alarm is set for fall risk patients  - Ensure chair alarm is in place and activated if patient is up in a chair   L  Lines - Check IV for any infiltration  - Jiang bag is empty if patient has a Jiang   - Tubing and IV bags are labeled   S  Safety   - Room is clean, patient is clean, and equipment is clean. - Hallways are clear from equipment besides carts. - Fall bracelet on for fall risk patients  - Ensure room is clear and free of clutter  - Suction is set up for ALL patients (with cailin)  - Hallways are clear from equipment besides carts.    - Isolation precautions followed, supplies available outside room, sign posted

## 2017-08-13 NOTE — ROUTINE PROCESS
Patient put out his PICC  Line at 17:25. Dr. Tee Goss notified. He gave order to insert a new peripheral  IV.

## 2017-08-13 NOTE — PROGRESS NOTES
Progress Note    Patient: Richelle Mak MRN: [id number]  CSN: [id number]    YOB: 1960  Age: 59 y.o. Sex: male    DOA: 8/7/2017 LOS:  LOS: 6 days                    Subjective:     Primary Rehab Diagnosis: Impaired Mobility and ADLs secondary to:  1. S/P Removal of infected graft; Repair of left superficial femoral artery; Repair of left common femoral artery (7/25/2017 - Dr. Hilda Carroll)  2. History of sepsis associated with infection of vascular bypass graft      Review of systems  General: No fevers or chills. Cardiovascular: No chest pain or pressure. No palpitations. Pulmonary: No shortness of breath, cough or wheeze. Gastrointestinal: No abdominal pain, nausea, vomiting or diarrhea. Genitourinary: No urinary frequency, urgency, hesitancy or dysuria. Musculoskeletal: Lt foot pain wound VAC Lt groin in place    Neurologic: No headache, numbness, redness pain Lt foot improved     Objective:     Physical Exam:  Visit Vitals    /76    Pulse 73    Temp 97 °F (36.1 °C)    Resp 18    Wt 75 kg (165 lb 5.5 oz)    SpO2 100%    BMI 23.72 kg/m2        General:         Alert, cooperative, no acute distress    HEENT: NC, Atraumatic. PERRLA, anicteric sclerae. Lungs: CTA Bilaterally. No Wheezing/Rhonchi/Rales. Heart:  Regular  rhythm,  No murmur, No Rubs, No Gallops  Abdomen: Soft, Non distended, Non tender.  +Bowel sounds, no HSM  Extremities: Lt foot  Slight redness slight swelling improved  weak pulse, Lt groin wound VAC   Psych:    Not anxious or agitated. Neurologic:  CN 2-12 grossly intact, Alert and oriented X 3.   No acute neurological                                 Deficits,     Intake and Output:  Current Shift:  08/13 0701 - 08/13 1900  In: 240 [P.O.:240]  Out: 1775 [XYMWB:6036]  Last three shifts:  08/11 1901 - 08/13 0700  In: 560 [P.O.:560]  Out: 9100 [Urine:9100]    Labs: Results:       Chemistry Recent Labs      08/13/17   0620  08/12/17   0610   GLU  89  89 NA  130*  132*   K  4.4  4.5   CL  94*  95*   CO2  27  28   BUN  14  12   CREA  0.68  0.84   CA  9.1  9.1   AGAP  9  9   BUCR  21*  14   AP  616*  623*   TP  7.6  7.6   ALB  2.7*  2.6*   GLOB  4.9*  5.0*   AGRAT  0.6*  0.5*      CBC w/Diff Recent Labs      08/13/17   0620   WBC  12.4   RBC  3.25*   HGB  9.3*   HCT  29.1*   PLT  469*   GRANS  74*   LYMPH  15*   EOS  2      Cardiac Enzymes No results for input(s): CPK, CKND1, LEO in the last 72 hours. No lab exists for component: CKRMB, TROIP   Coagulation Recent Labs      08/13/17   0620  08/12/17   0610   PTP  22.8*  24.5*   INR  2.1*  2.3*       Lipid Panel Lab Results   Component Value Date/Time    Cholesterol, total 157 12/29/2015 07:18 AM    HDL Cholesterol 28 12/29/2015 07:18 AM    LDL, calculated 112.4 12/29/2015 07:18 AM    VLDL, calculated 16.6 12/29/2015 07:18 AM    Triglyceride 83 12/29/2015 07:18 AM    CHOL/HDL Ratio 5.6 12/29/2015 07:18 AM      BNP No results for input(s): BNPP in the last 72 hours.    Liver Enzymes Recent Labs      08/13/17   0620   TP  7.6   ALB  2.7*   AP  616*   SGOT  27      Thyroid Studies Lab Results   Component Value Date/Time    TSH 6.74 08/02/2017 05:41 AM          Procedures/imaging: see electronic medical records for all procedures/Xrays and details which were not copied into this note but were reviewed prior to creation of Plan    Medications:   Current Facility-Administered Medications   Medication Dose Route Frequency    warfarin (COUMADIN) tablet 4 mg  4 mg Oral ONCE    pneumococcal 23-valent (PNEUMOVAX 23) injection 0.5 mL  0.5 mL IntraMUSCular PRIOR TO DISCHARGE    losartan (COZAAR) tablet 50 mg  50 mg Oral DAILY    sodium chloride tablet 1 g  1 g Oral BID WITH MEALS    magnesium oxide (MAG-OX) tablet 400 mg  400 mg Oral DAILY    collagenase (SANTYL) 250 unit/gram ointment   Topical DAILY    alteplase (CATHFLO) 1 mg in sterile water (preservative free) 1 mL injection  1 mg InterCATHeter PRN    acetaminophen (TYLENOL) tablet 650 mg  650 mg Oral Q4H PRN    docusate sodium (COLACE) capsule 100 mg  100 mg Oral BID    bisacodyl (DULCOLAX) tablet 10 mg  10 mg Oral Q48H PRN    amiodarone (CORDARONE) tablet 200 mg  200 mg Oral DAILY    ascorbic acid (vitamin C) (VITAMIN C) tablet 250 mg  250 mg Oral DAILY WITH BREAKFAST    aspirin chewable tablet 81 mg  81 mg Oral DAILY WITH BREAKFAST    ceFAZolin (ANCEF) 2g IVPB in 50 mL D5W  2 g IntraVENous Q8H    cholecalciferol (VITAMIN D3) tablet 1,000 Units  1,000 Units Oral DAILY    cyanocobalamin tablet 1,000 mcg  1,000 mcg Oral DAILY    dilTIAZem (CARDIZEM) IR tablet 30 mg  30 mg Oral AC&HS    DULoxetine (CYMBALTA) capsule 60 mg  60 mg Oral DAILY    furosemide (LASIX) tablet 20 mg  20 mg Oral DAILY    gabapentin (NEURONTIN) capsule 300 mg  300 mg Oral Q8H    lactobacillus sp. 50 billion cpu (BIO-K PLUS) capsule 1 Cap  1 Cap Oral DAILY    metoprolol tartrate (LOPRESSOR) tablet 12.5 mg  12.5 mg Oral Q12H    oxyCODONE IR (ROXICODONE) tablet 20 mg  20 mg Oral TID    polyethylene glycol (MIRALAX) packet 17 g  17 g Oral DAILY    potassium chloride (K-DUR, KLOR-CON) SR tablet 20 mEq  20 mEq Oral BID    oxyCODONE IR (ROXICODONE) tablet 15-20 mg  15-20 mg Oral Q4H PRN    zinc sulfate (ZINCATE) capsule 220 mg  1 Cap Oral DAILY    atorvastatin (LIPITOR) tablet 40 mg  40 mg Oral QHS    WARFARIN INFORMATION NOTE (COUMADIN)   Other Q24H       Assessment/Plan     Principal Problem:    Sepsis associated with internal vascular access (HCC) (7/24/2017)    Active Problems:    Benign hypertensive heart disease with systolic congestive heart failure, NYHA class 2 (HCC) (1/26/2015)      Chronic atrial fibrillation (HCC) ()      Acute blood loss as cause of postoperative anemia (7/25/2017)      Impaired mobility and ADLs (7/24/2017)      Anticoagulated on Coumadin ()      Aftercare following surgery of the circulatory system (7/25/2017)      Overview: S/P Removal of infected graft; Repair of left superficial femoral artery;       Repair of left common femoral artery (7/25/2017 - Dr. Christy Fairchild)      Infection of vascular bypass graft [facility]) (7/24/2017)      Overview: Left lower extremity      Chronic ulcer of right heel ([address] (8/8/2017)    Plan  INR 2.1 will give  coumadin 4 mg x1 today  f/u INR in AM  Vascular surgery  Pending was called yesterday   Continue IV ABT Cefazolin  Wound care Rt heel      Thor Jordan MD  8/13/2017 12:47 PM

## 2017-08-13 NOTE — INTERVAL H&P NOTE
H&P Update:  Kellie Gonzalez was seen and examined. History and physical has been reviewed. Significant clinical changes have occurred as noted:  Was found to have some apparent oozing at vac site after using restroom. Decision was made to change dressing. On removal of granulofoam and apparent exploration by nursing large amounts of splurting blood was found. Immediate pressure was held and I was called. Patient will require reexploration. Likely ligation and amputation vs jump bypass to sfa with CFA ligation.     Signed By: Osvaldo Booker MD     July 4, 2017 10:36 AM no

## 2017-08-13 NOTE — ROUTINE PROCESS
SHIFT CHANGE NOTE FOR [facility]    Bedside and Verbal shift change report given to Linette Gonzalez RN (oncoming nurse) by Rajesh Wood RN   (offgoing nurse). Report included the following information SBAR, Kardex, MAR and Recent Results. Situation:   Code Status: Full Code   Reason for Admission: sepsis  Hospital Day: 6   Problem List:   Hospital Problems  Date Reviewed: 8/11/2017          Codes Class Noted POA    Chronic ulcer of right heel ([facility] [address] (Chronic) ICD-10-CM: L97.419  ICD-9-CM: 707.14  8/8/2017 Yes        Acute blood loss as cause of postoperative anemia ICD-10-CM: D62  ICD-9-CM: 285.1  7/25/2017 Yes        Aftercare following surgery of the circulatory system ICD-10-CM: Z48.812  ICD-9-CM: V58.73  7/25/2017 Yes    Overview Addendum 8/7/2017  2:20 PM by Funmi Titus MD     S/P Removal of infected graft; Repair of left superficial femoral artery; Repair of left common femoral artery (7/25/2017 - Dr. Catherine Vaz)             Impaired mobility and ADLs ICD-10-CM: Z74.09  ICD-9-CM: 799.89  7/24/2017 Yes        Infection of vascular bypass graft ([facility] ICD-10-CM: T82. 7XXA  ICD-9-CM: 996.62  7/24/2017 Yes    Overview Signed 8/7/2017  2:19 PM by Funmi Titus MD     Left lower extremity             * (Principal)Sepsis associated with internal vascular access [facility]) ICD-10-CM: T82. 7XXA, A41.9  ICD-9-CM: 996.62, 038.9  7/24/2017 Yes        Anticoagulated on Coumadin (Chronic) ICD-10-CM: Z51.81, Z79.01  ICD-9-CM: V58.83, V58.61  Unknown Yes        Chronic atrial fibrillation (HCC) (Chronic) ICD-10-CM: I48.2  ICD-9-CM: 427.31  Unknown Yes        Benign hypertensive heart disease with systolic congestive heart failure, NYHA class 2 (HCC) (Chronic) ICD-10-CM: I11.0, I50.20  ICD-9-CM: 402.11, 428.20, 428.0  1/26/2015 Yes              Background:   Past Medical History:   Past Medical History:   Diagnosis Date    Acute blood loss as cause of postoperative anemia 4/4/2017    Anticoagulated on Coumadin     Aortoiliac occlusive disease ([facility] 1/25/2017    Atherosclerosis of native artery of both lower extremities with intermittent claudication ([address] 1/25/2017    Benign hypertensive heart disease with systolic congestive heart failure, NYHA class 2 ([address] 1/26/2015    Carotid artery disease (HCC)     Chronic anemia     Chronic atrial fibrillation (HCC)     Chronic systolic heart failure (HCC)     Chronic ulcer of right foot ([address] 4/4/2017    Chronic ulcer of right heel ([address] 8/8/2017    Coronary artery disease involving native coronary artery of native heart 3/15/2017    Successful stenting of Cx (Xience LESLEY) and RCA (Xience LESLEY) to 0% by Dr. Kaylee Parker on 3/15/17.  DDD (degenerative disc disease), lumbar 1/26/2015    Dyslipidemia     Erectile dysfunction 7/5/2016    Euthyroid sick syndrome 4/6/2017    Hereditary peripheral neuropathy 11/15/2016    History of cardioversion 4/18/2017    S/P Synchronized external cardioversion (4/18/2017 - Dr. Willy Ko)    History of vitamin D deficiency 4/22/2017    Vitamin D 25-Hydroxy (4/22/2017) = 12.0; Vitamin D 25-Hydroxy (5/26/2017) = 38.7    Infection of vascular bypass graft ([facility] [address] 7/24/2017    Left lower extremity    Ischemic cardiomyopathy     Peripheral artery disease ([facility] [address] 1/25/2017    Spinal stenosis of lumbar region with radiculopathy 5/4/2015    Dr. Baron Matias      Patient taking anticoagulants no    Patient has a defibrillator: no     Assessment:   Changes in Assessment throughout shift: no     Patient has central line: no Reasons if yes: Insertion date: Last dressing date:   Patient has Jiang Cath: no Reasons if yes:     Insertion date:     Last Vitals:     Vitals:    08/13/17 0544 08/13/17 0639 08/13/17 0727 08/13/17 1700   BP: 148/81 141/76 157/76 135/71   Pulse: 66 64 73 71   Resp:   18 18   Temp:   97 °F (36.1 °C) 99.2 °F (37.3 °C)   SpO2:   100% 93%   Weight:            PAIN    Pain Assessment    Pain Intensity 1: 4 (08/13/17 1600) Pain Intensity 1: 2 (12/29/14 1105)    Pain Location 1: Foot Pain Location 1: Abdomen    Pain Intervention(s) 1: Medication (see MAR) Pain Intervention(s) 1: Medication (see MAR)  Patient Stated Pain Goal: 4 Patient Stated Pain Goal: 0  o Intervention effective: no   o Other actions taken for pain: pain medicine     Skin Assessment  Skin color Skin Color: Appropriate for ethnicity  Condition/Temperature Skin Condition/Temp: Warm  Integrity Skin Integrity: Wound (add Wound LDA)  Turgor Turgor: Non-tenting  Weekly Pressure Ulcer Documentation  Pressure  Injury Documentation: No Pressure Injury Noted-Pressure Ulcer Prevention Initiated  Wound Prevention & Protection Methods  Orientation of wound Orientation of Wound Prevention: Posterior  Location of Prevention Location of Wound Prevention: Sacrum/Coccyx  Dressing Present Dressing Present : No  Dressing Status Dressing Status: Intact  Wound Offloading Wound Offloading (Prevention Methods): Bed, pressure redistribution/air, Chair cushion, Wheelchair     INTAKE/OUPUT    Date 08/12/17 1900 - 08/13/17 0659 08/13/17 0700 - 08/14/17 0659   Shift 2331-7770 24 Hour Total 8748-2852 6239-8011 24 Hour Total   I  N  T  A  K  E   P. O.  560 660  660      P. O.  560 660  660    Shift Total  (mL/kg)  560  (7.5) 660  (8.8)  660  (8.8)   O  U  T  P  U  T   Urine  (mL/kg/hr) 4550 5750 1775  (2)  1775      Urine Voided 4550 5750 1775  1775      Urine Occurrence(s) 11 x 16 x 4 x  4 x    Stool           Stool Occurrence(s) 1 x 3 x 1 x  1 x    Shift Total  (mL/kg) 4550  (60.7) 5750  (76.7) 1775  (23.7)  1775  (23.7)   NET -4550 -5190 -1115  -1115   Weight (kg) 75 75 75 75 75       Recommendations:  1. Patient needs and requests: education    2. Diet: cardiac    3. Pending tests/procedures: no     4. Functional Level/Equipment: 1 x person assist    5.  Estimated Discharge Date: TDB Posted on Whiteboard in Patients Room: no     [facility]    A safety check occurred in the patient's room between off going nurse and oncoming nurse listed above. The safety check included the below items  Area Items   H  High Alert Medications - Verify all high alert medication drips (heparin, PCA, etc.)   E  Equipment - Suction is set up for ALL patients (with cailin)  - Red plugs utilized for all equipment (IV pumps, etc.)  - WOWs wiped down at end of shift.  - Room stocked with oxygen, suction, and other unit-specific supplies   A  Alarms - Bed alarm is set for fall risk patients  - Ensure chair alarm is in place and activated if patient is up in a chair   L  Lines - Check IV for any infiltration  - Jiang bag is empty if patient has a Jiang   - Tubing and IV bags are labeled   S  Safety   - Room is clean, patient is clean, and equipment is clean. - Hallways are clear from equipment besides carts. - Fall bracelet on for fall risk patients  - Ensure room is clear and free of clutter  - Suction is set up for ALL patients (with cailin)  - Hallways are clear from equipment besides carts.    - Isolation precautions followed, supplies available outside room, sign posted

## 2017-08-14 LAB
ANION GAP BLD CALC-SCNC: 9 MMOL/L (ref 3–18)
BUN SERPL-MCNC: 18 MG/DL (ref 7–18)
BUN/CREAT SERPL: 24 (ref 12–20)
CALCIUM SERPL-MCNC: 9.1 MG/DL (ref 8.5–10.1)
CHLORIDE SERPL-SCNC: 95 MMOL/L (ref 100–108)
CO2 SERPL-SCNC: 27 MMOL/L (ref 21–32)
CREAT SERPL-MCNC: 0.76 MG/DL (ref 0.6–1.3)
GLUCOSE SERPL-MCNC: 93 MG/DL (ref 74–99)
HCT VFR BLD AUTO: 30.4 % (ref 36–48)
HGB BLD-MCNC: 10.1 G/DL (ref 13–16)
INR PPP: 1.7 (ref 0.8–1.2)
MAGNESIUM SERPL-MCNC: 1.8 MG/DL (ref 1.6–2.6)
POTASSIUM SERPL-SCNC: 4.8 MMOL/L (ref 3.5–5.5)
PROTHROMBIN TIME: 19.5 SEC (ref 11.5–15.2)
SODIUM SERPL-SCNC: 131 MMOL/L (ref 136–145)

## 2017-08-14 PROCEDURE — 83735 ASSAY OF MAGNESIUM: CPT | Performed by: INTERNAL MEDICINE

## 2017-08-14 PROCEDURE — 97110 THERAPEUTIC EXERCISES: CPT

## 2017-08-14 PROCEDURE — 97530 THERAPEUTIC ACTIVITIES: CPT

## 2017-08-14 PROCEDURE — 74011250636 HC RX REV CODE- 250/636: Performed by: INTERNAL MEDICINE

## 2017-08-14 PROCEDURE — 36415 COLL VENOUS BLD VENIPUNCTURE: CPT | Performed by: INTERNAL MEDICINE

## 2017-08-14 PROCEDURE — 65310000000 HC RM PRIVATE REHAB

## 2017-08-14 PROCEDURE — 85018 HEMOGLOBIN: CPT | Performed by: INTERNAL MEDICINE

## 2017-08-14 PROCEDURE — 85610 PROTHROMBIN TIME: CPT | Performed by: INTERNAL MEDICINE

## 2017-08-14 PROCEDURE — 74011250637 HC RX REV CODE- 250/637: Performed by: INTERNAL MEDICINE

## 2017-08-14 PROCEDURE — 80048 BASIC METABOLIC PNL TOTAL CA: CPT | Performed by: INTERNAL MEDICINE

## 2017-08-14 PROCEDURE — 97535 SELF CARE MNGMENT TRAINING: CPT

## 2017-08-14 RX ORDER — HYDROCODONE BITARTRATE AND ACETAMINOPHEN 10; 325 MG/1; MG/1
1-2 TABLET ORAL
Status: DISCONTINUED | OUTPATIENT
Start: 2017-08-14 | End: 2017-08-17 | Stop reason: HOSPADM

## 2017-08-14 RX ORDER — HYDROMORPHONE HYDROCHLORIDE 1 MG/ML
1 INJECTION, SOLUTION INTRAMUSCULAR; INTRAVENOUS; SUBCUTANEOUS 2 TIMES DAILY
Status: DISCONTINUED | OUTPATIENT
Start: 2017-08-14 | End: 2017-08-15

## 2017-08-14 RX ORDER — HYDROMORPHONE HYDROCHLORIDE 1 MG/ML
1 INJECTION, SOLUTION INTRAMUSCULAR; INTRAVENOUS; SUBCUTANEOUS 2 TIMES DAILY
Status: DISCONTINUED | OUTPATIENT
Start: 2017-08-14 | End: 2017-08-14

## 2017-08-14 RX ORDER — WARFARIN 4 MG/1
4 TABLET ORAL ONCE
Status: DISCONTINUED | OUTPATIENT
Start: 2017-08-14 | End: 2017-08-14

## 2017-08-14 RX ADMIN — ZINC SULFATE 220 MG (50 MG) CAPSULE 220 MG: CAPSULE at 08:02

## 2017-08-14 RX ADMIN — POLYETHYLENE GLYCOL 3350 17 G: 17 POWDER, FOR SOLUTION ORAL at 18:58

## 2017-08-14 RX ADMIN — METOPROLOL TARTRATE 12.5 MG: 25 TABLET ORAL at 21:36

## 2017-08-14 RX ADMIN — FUROSEMIDE 20 MG: 20 TABLET ORAL at 05:37

## 2017-08-14 RX ADMIN — ATORVASTATIN CALCIUM 40 MG: 40 TABLET, FILM COATED ORAL at 21:36

## 2017-08-14 RX ADMIN — GABAPENTIN 300 MG: 300 CAPSULE ORAL at 14:05

## 2017-08-14 RX ADMIN — DILTIAZEM HYDROCHLORIDE 30 MG: 30 TABLET, FILM COATED ORAL at 21:36

## 2017-08-14 RX ADMIN — DILTIAZEM HYDROCHLORIDE 30 MG: 30 TABLET, FILM COATED ORAL at 17:44

## 2017-08-14 RX ADMIN — HYDROMORPHONE HYDROCHLORIDE 1 MG: 1 INJECTION, SOLUTION INTRAMUSCULAR; INTRAVENOUS; SUBCUTANEOUS at 21:36

## 2017-08-14 RX ADMIN — COLLAGENASE SANTYL: 250 OINTMENT TOPICAL at 08:08

## 2017-08-14 RX ADMIN — CEFAZOLIN SODIUM 2 G: 2 SOLUTION INTRAVENOUS at 21:44

## 2017-08-14 RX ADMIN — VITAMIN D, TAB 1000IU (100/BT) 1000 UNITS: 25 TAB at 08:04

## 2017-08-14 RX ADMIN — DOCUSATE SODIUM 100 MG: 100 CAPSULE, LIQUID FILLED ORAL at 08:03

## 2017-08-14 RX ADMIN — CYANOCOBALAMIN TAB 1000 MCG 1000 MCG: 1000 TAB at 08:02

## 2017-08-14 RX ADMIN — POTASSIUM CHLORIDE 20 MEQ: 20 TABLET, EXTENDED RELEASE ORAL at 09:00

## 2017-08-14 RX ADMIN — METOPROLOL TARTRATE 12.5 MG: 25 TABLET ORAL at 08:04

## 2017-08-14 RX ADMIN — SODIUM CHLORIDE TAB 1 GM 1 G: 1 TAB at 08:04

## 2017-08-14 RX ADMIN — HYDROCODONE BITARTRATE AND ACETAMINOPHEN 1 TABLET: 10; 325 TABLET ORAL at 12:41

## 2017-08-14 RX ADMIN — HYDROCODONE BITARTRATE AND ACETAMINOPHEN 2 TABLET: 10; 325 TABLET ORAL at 22:44

## 2017-08-14 RX ADMIN — DILTIAZEM HYDROCHLORIDE 30 MG: 30 TABLET, FILM COATED ORAL at 06:47

## 2017-08-14 RX ADMIN — GABAPENTIN 300 MG: 300 CAPSULE ORAL at 05:37

## 2017-08-14 RX ADMIN — CEFAZOLIN SODIUM 2 G: 2 SOLUTION INTRAVENOUS at 05:37

## 2017-08-14 RX ADMIN — CEFAZOLIN SODIUM 2 G: 2 SOLUTION INTRAVENOUS at 14:05

## 2017-08-14 RX ADMIN — HYDROCODONE BITARTRATE AND ACETAMINOPHEN 1 TABLET: 10; 325 TABLET ORAL at 17:43

## 2017-08-14 RX ADMIN — DULOXETINE HYDROCHLORIDE 60 MG: 30 CAPSULE, DELAYED RELEASE ORAL at 08:04

## 2017-08-14 RX ADMIN — AMIODARONE HYDROCHLORIDE 200 MG: 200 TABLET ORAL at 08:04

## 2017-08-14 RX ADMIN — Medication 1 CAPSULE: at 08:04

## 2017-08-14 RX ADMIN — Medication 400 MG: at 08:04

## 2017-08-14 RX ADMIN — ASPIRIN 81 MG 81 MG: 81 TABLET ORAL at 08:04

## 2017-08-14 RX ADMIN — LOSARTAN POTASSIUM 50 MG: 50 TABLET ORAL at 05:37

## 2017-08-14 RX ADMIN — DILTIAZEM HYDROCHLORIDE 30 MG: 30 TABLET, FILM COATED ORAL at 12:08

## 2017-08-14 RX ADMIN — POTASSIUM CHLORIDE 20 MEQ: 20 TABLET, EXTENDED RELEASE ORAL at 17:44

## 2017-08-14 RX ADMIN — DOCUSATE SODIUM 100 MG: 100 CAPSULE, LIQUID FILLED ORAL at 17:44

## 2017-08-14 RX ADMIN — GABAPENTIN 300 MG: 300 CAPSULE ORAL at 21:36

## 2017-08-14 RX ADMIN — SODIUM CHLORIDE TAB 1 GM 1 G: 1 TAB at 17:44

## 2017-08-14 RX ADMIN — Medication 250 MG: at 08:04

## 2017-08-14 NOTE — INTERDISCIPLINARY ROUNDS
[facility] PHYSICAL REHABILITATION  [address] [place] [address], [address]    INPATIENT REHABILITATION  PRE-TEAM CONFERENCE SUMMARY     Date of Conference: 8/15/2017    Name: Smitha Saul Age / Sex: 59 y.o. / male   CSN: [id number] MRN: [id number]   [facility] Date: 8/7/2017 Length of Stay: 7 days     Primary Rehabilitation Diagnosis  1. Impaired Mobility and ADLs  2. S/P Removal of infected graft; Repair of left superficial femoral artery; Repair of left common femoral artery (7/25/2017 - Dr. Pop Urbina);  History of sepsis associated with infection of vascular bypass graft      Comorbidities   Benign hypertensive heart disease with systolic congestive heart failure, NYHA class 2  I11.0, I50.20    DDD (degenerative disc disease), lumbar M51.36    Erectile dysfunction N52.9    Spinal stenosis of lumbar region with radiculopathy M48.06, M54.16    Hereditary peripheral neuropathy G60.9    Aortoiliac occlusive disease  I74.09    Atherosclerosis of native artery of both lower extremities with intermittent claudication  I70.213    Peripheral artery disease  I73.9    Coronary artery disease involving native coronary artery of native heart I25.10    Chronic atrial fibrillation  I48.2    Acute blood loss as cause of postoperative anemia D62    Impaired mobility and ADLs Z74.09    Anticoagulated on Coumadin Z51.81, Z79.01    Ischemic cardiomyopathy I25.5    Chronic systolic heart failure  I14.69    Carotid artery disease  I77.9    Hyperammonemia  E72.20    Dyslipidemia E78.5    Euthyroid sick syndrome E07.81    Aftercare following surgery of the circulatory system Z48.812    Status post femoral-popliteal bypass surgery Z95.828    History of cardioversion Z98.890    Critical ischemia of lower extremity I99.8    Chronic ulcer of right foot  L97.519    History of vitamin D deficiency Z86.39    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    Chronic anemia D64.9    Chronic ulcer of right heel  L97.419           Therapy:     FIM SCORES Initial Assessment Weekly Progress Assessment 8/14/2017   Eating Functional Level: 6  6 mod independent   Swallowing     Grooming    7-independent   Bathing 4  4 - Min A      Upper Body Dressing Functional Level: 5  Items Applied/Steps Completed: Pullover (4 steps)  Comments: Setup only  7 - Independent   Lower Body Dressing Functional Level: 3  Items Applied/Steps Completed: Elastic waist pants (3 steps), Sock, left (1 step), Sock, right (1 step)  Comments: Pt able to jasmeet socks and thread LEs into shorts while seated on EOB with slight assistance for management of wound vac lines. Pt requires Min-Mod A for standing balance while standing with RW and additional assistance for managing clothing over buttocks due to pt requiring constant BUE support on the walker in standing. 3 - Mod A   Toileting Functional Level: 2  Comments: Based on performance of LB bathing and dressing, pt would require maximal assistance for clothing management and thorough hygiene with Min-Mod A for static standing balance while assistance is provided for clothing. 2 - Max A   Bladder - level of assist 6 5   Bladder - accident frequency score 6 6   Bowel - level of assist 2 6   Bowel - accident frequency score 6     Toilet Transfer [place] Toilet Transfer Score: 3  Comments: Based on bed to w/c transfer, pt would require Mod A to perform stand step transfer to/from [facility] utilizing RW. Mod A required due to impaired balance, posterior lean, decreased strength, and inability to weightbear on LLE. Tub/Shower Transfer [place] Tub/Shower Transfer Score: 3  Comments: Based on bed to w/c transfer, pt would require Mod A for balance to perform stand step transfer using RW due to impaired balance, posterior lean, decreased strength, and inability to weightbear on LLE this session.   3 - Mod A      Comprehension Primary Mode of Comprehension: Auditory  Score: 6 Auditory  6   Expression Primary Mode of Expression: Verbal  Score: 6 Verbal  6   Social Interaction Score: 5 6   Problem Solving Score: 5 6   Memory Score: 5 6     FIM SCORES Initial Assessment Weekly Progress Assessment 8/14/2017   Bed/Chair/Wheelchair Transfers Transfer Type: Other  Other: stand step with RW  Transfer Assistance : 3 (Moderate assistance ) (min-mod assist secondary to L LE pain)  Sit to Stand Assistance:  Moderate assistance (min-mod assist secondary to L LE pain)  Car Transfers: Not tested  Car Type: N/A NT   Bed Mobility Rolling Right 5 (Supervision)   Rolling Left 5 (Supervision)   Supine to Sit 5 (Supervision)   Sit to Stand Moderate assistance (min-mod assist secondary to L LE pain)   Sit to Supine 5 (Supervision)    Rolling Right   NT   Rolling Left   NT   Supine to Sit   NT   Sit to Stand   NT   Sit to Supine   NT      Locomotion (W/C) Able to Propel (ft): 232 feet  Functional Level: 5  Curbs/Ramps Assist Required (FIM Score): 0 (Not tested)  Wheelchair Setup Assist Required : 5 (Supervision/setup)  Wheelchair Management: Manages left brake, Manages right brake Function: Supervision  Setup Assistance      Locomotion (W/C distance) 232 Feet 232 Feet   Locomotion (Walk) 4 (Minimal assistance) NT   Locomotion (Walk dist.) 12 Feet (ft) (x 2 repetitions) NT   Steps/Stairs Steps/Stairs Ambulated (#): 0  Level of Assist : 0 (Not tested)  Rail Use: None NT         Nursing:     Neuro:   A&O x_3-4___                   Respiratory:   [x] WNL   [] O2   [] LPM ______   Other:  Peripheral Vascular:   [] TEDS present   [x] Edema present ____ Grade   Cardiac:   [] WNL   [x] Other  Genitourinary:   [x] continent   [x] incontinent   [] lopez  Abdominal ____8/14___ LBM  GI: ___cardiac regular____ Diet __thin____ Liquids _____ tube feeds  Musculoskeletal: stand/step with r/w____ ROM Transfers _____ Assistive Device Used  __mod__ Level of Assistance  Skin Integumentary:   [] Intact   [x] Not Intact   __________Preventative Measures  Details_wound vac_____________________________________________________________  Pain: [x] Controlled   [] Not Controlled   Pain Meds:   [x] Scheduled   [x] PRN        Registered Dietitian / Nutrition:   Patient Vitals for the past 100 hrs:   % Diet Eaten   08/14/17 1012 75 %   08/13/17 1822 100 %   08/13/17 1335 90 %   08/13/17 0900 80 %   08/12/17 1800 100 %   08/12/17 1320 90 %   08/12/17 0915 100 %   08/11/17 1802 100 %   08/11/17 1254 100 %   08/11/17 0900 90 %   08/10/17 1816 75 %     Pt little confused at time of visit; could not recall if he ate any food or supplements today. Noted good/excellent meal intake per chart. Pt dropped dentures on floor during visit. Encouraged pt to wash them well prior to continued use; he declined and only rinsed them    Supplements:          [x] Yes: Jl BID   [] No      Amount of supplement consumed:  unable to assess at time of visit      Intake/Output Summary (Last 24 hours) at 08/14/17 1057  Last data filed at 08/14/17 1012   Gross per 24 hour   Intake              600 ml   Output              600 ml   Net                0 ml                                Last bowel movement: 8/13      Interdisciplinary Team Goals:     1. Goal  Encourage pt to participate in mobility as tolerated. Barrier  Pain, Impaired strength, Impaired balance    Intervention  Strength and Balance training, Mobility training     2. Goal  OT: Encourage pt to participate in ADLs and UB strengthening as able. Barrier  Pain in B's LE; Decrease UE strength     Intervention  ADL training; Therapeutic exercise     3. Goal      Barrier      Intervention       4. Goal  Patient will have no falls during rehab stay. Barrier  Cognition, balance, impulsive    Intervention  bed/chair alarms, frequent rounding     5.  Goal  Identify emergent concerns about medical problems continuing    Barrier  Limited insight about parameters of medical circumstances emerging    Intervention  Patient education and support      6. Goal  Po intake of meals will continue to meet >75% of patient estimated nutritional needs within the next 7 days. Outcome:  [x] Met/Ongoing    []  Not Met    [] New/Initial Goal     Barrier  none known     Intervention  modified diet; nutrition supplement       Disposition / Discharge Planning: Follow-up therapy services:  tbd   DME recommendations:  tbd   Estimated discharge date:  8/22/17   Discharge Location:  home         Electronic Signatures:      Signature Date Signed   Physical Therapist    Morelia Richter, PT, DPT 8/14/17    Occupational Therapist    Elizabeth GILLILAND/IWONA Schmidt, [facility] [address]  8/14/17   8/15/2017   Speech Therapist         Recreational Therapist    Stella Gomes, CTRS 8/14/17   Nursing    Terri Nj, RN  8/14/17   Dietitian    Chante Short RD  8/14/17   Clinical Psychologist    Marques Forte, PhD 8/14/17    Physician    Jame Burkitt, MD   8/14/2017         Cooper Billy, MSW  8/14/17         The above information has been reviewed with the patient in a language that they can understand. Opportunity for comments and questions has been provided and a signed attestation has been scanned into the \"media tab\" of the EMR.       Patient Signature: ______________________________________________________    Date Signed: __________________________________________________________

## 2017-08-14 NOTE — PROGRESS NOTES
[address]  [address] [place] [address], [address]     INPATIENT REHABILITATION  DAILY PROGRESS NOTE     Date: 8/14/2017    Name: Richelle Mak Age / Sex: 59 y.o. / male   CSN: [id number] MRN: [id number]   [facility] Date: 8/7/2017 Length of Stay: 7 days     Primary Rehab Diagnosis: Impaired Mobility and ADLs secondary to:  1. S/P Removal of infected graft; Repair of left superficial femoral artery; Repair of left common femoral artery (7/25/2017 - Dr. Hilda Carroll)  2. History of sepsis associated with infection of vascular bypass graft      Subjective:     Patient seen and examined. Blood pressure controlled. Staff reporting confusion and hallucinations. Patient's Complaint:   Oxycodone makes him loopy - refused to take Oxycodone    Pain Control: increasing significant pain on LLE      Objective:     Vital Signs:  Patient Vitals for the past 24 hrs:   BP Temp Pulse Resp SpO2   08/14/17 0751 157/68 98.2 °F (36.8 °C) 70 18 90 %   08/14/17 0647 126/60 - 66 - -   08/14/17 0537 122/63 - 67 - -   08/13/17 2200 122/63 - 68 - -   08/13/17 2044 126/61 97 °F (36.1 °C) 65 18 98 %   08/13/17 1700 135/71 99.2 °F (37.3 °C) 71 18 93 %        Physical Examination:  GENERAL SURVEY: Patient is awake, alert, oriented x 3, sitting comfortably on the chair, not in acute respiratory distress.   HEENT: pale palpebral conjunctivae, anicteric sclerae, no nasoaural discharge, moist oral mucosa  NECK: supple, no jugular venous distention, no palpable lymph nodes  CHEST/LUNGS: symmetrical chest expansion, good air entry, clear breath sounds  HEART: adynamic precordium, good S1 S2, no S3, regular rhythm, no murmurs  ABDOMEN: flat, bowel sounds appreciated, soft, non-tender  EXTREMITIES: (+) WoundVac on left groin, (+) erythema on shin area of left leg, (+) ~1.5 cm x ~1.5 cm ulcer on right heel, (+) <1 cm x <1 cm wound above lateral malleolus of right ankle, pale nailbeds, trace bipedal edema, full and equal pulses, no calf tenderness   NEUROLOGICAL EXAM: The patient is awake, alert and oriented x3, able to answer questions fairly appropriately, able to follow 1 and 2 step commands. Able to tell time from the wall clock. Cranial nerves II-XII are grossly intact. No gross sensory deficit except for numbness from the middle of the left lower leg downwards.   Motor strength is 4+/5 on BUE, 4+/5 on the RLE, 3+/5 on the left hip, 4-/5 on the left knee and left ankle.     Incision(s): healing well, clean, dry, and intact        Current Medications:  Current Facility-Administered Medications   Medication Dose Route Frequency    pneumococcal 23-valent (PNEUMOVAX 23) injection 0.5 mL  0.5 mL IntraMUSCular PRIOR TO DISCHARGE    losartan (COZAAR) tablet 50 mg  50 mg Oral DAILY    sodium chloride tablet 1 g  1 g Oral BID WITH MEALS    magnesium oxide (MAG-OX) tablet 400 mg  400 mg Oral DAILY    collagenase (SANTYL) 250 unit/gram ointment   Topical DAILY    alteplase (CATHFLO) 1 mg in sterile water (preservative free) 1 mL injection  1 mg InterCATHeter PRN    acetaminophen (TYLENOL) tablet 650 mg  650 mg Oral Q4H PRN    docusate sodium (COLACE) capsule 100 mg  100 mg Oral BID    bisacodyl (DULCOLAX) tablet 10 mg  10 mg Oral Q48H PRN    amiodarone (CORDARONE) tablet 200 mg  200 mg Oral DAILY    ascorbic acid (vitamin C) (VITAMIN C) tablet 250 mg  250 mg Oral DAILY WITH BREAKFAST    aspirin chewable tablet 81 mg  81 mg Oral DAILY WITH BREAKFAST    ceFAZolin (ANCEF) 2g IVPB in 50 mL D5W  2 g IntraVENous Q8H    cholecalciferol (VITAMIN D3) tablet 1,000 Units  1,000 Units Oral DAILY    cyanocobalamin tablet 1,000 mcg  1,000 mcg Oral DAILY    dilTIAZem (CARDIZEM) IR tablet 30 mg  30 mg Oral AC&HS    DULoxetine (CYMBALTA) capsule 60 mg  60 mg Oral DAILY    furosemide (LASIX) tablet 20 mg  20 mg Oral DAILY    gabapentin (NEURONTIN) capsule 300 mg  300 mg Oral Q8H    lactobacillus sp. 50 billion cpu (BIO-iPowow PLUS) capsule 1 Cap  1 Cap Oral DAILY    metoprolol tartrate (LOPRESSOR) tablet 12.5 mg  12.5 mg Oral Q12H    oxyCODONE IR (ROXICODONE) tablet 20 mg  20 mg Oral TID    polyethylene glycol (MIRALAX) packet 17 g  17 g Oral DAILY    potassium chloride (K-DUR, KLOR-CON) SR tablet 20 mEq  20 mEq Oral BID    oxyCODONE IR (ROXICODONE) tablet 15-20 mg  15-20 mg Oral Q4H PRN    zinc sulfate (ZINCATE) capsule 220 mg  1 Cap Oral DAILY    atorvastatin (LIPITOR) tablet 40 mg  40 mg Oral QHS    WARFARIN INFORMATION NOTE (COUMADIN)   Other Q24H       Allergies: Allergies   Allergen Reactions    Morphine Other (comments)     Patient gets confused with morphine.        Lab/Data Review:  Recent Results (from the past 24 hour(s))   METABOLIC PANEL, BASIC    Collection Time: 08/14/17  6:40 AM   Result Value Ref Range    Sodium 131 (L) 136 - 145 mmol/L    Potassium 4.8 3.5 - 5.5 mmol/L    Chloride 95 (L) 100 - 108 mmol/L    CO2 27 21 - 32 mmol/L    Anion gap 9 3.0 - 18 mmol/L    Glucose 93 74 - 99 mg/dL    BUN 18 7.0 - 18 MG/DL    Creatinine 0.76 0.6 - 1.3 MG/DL    BUN/Creatinine ratio 24 (H) 12 - 20      GFR est AA >60 >60 ml/min/1.73m2    GFR est non-AA >60 >60 ml/min/1.73m2    Calcium 9.1 8.5 - 10.1 MG/DL   HGB & HCT    Collection Time: 08/14/17  6:40 AM   Result Value Ref Range    HGB 10.1 (L) 13.0 - 16.0 g/dL    HCT 30.4 (L) 36.0 - 48.0 %   PROTHROMBIN TIME + INR    Collection Time: 08/14/17  6:40 AM   Result Value Ref Range    Prothrombin time 19.5 (H) 11.5 - 15.2 sec    INR 1.7 (H) 0.8 - 1.2     MAGNESIUM    Collection Time: 08/14/17  6:40 AM   Result Value Ref Range    Magnesium 1.8 1.6 - 2.6 mg/dL       Estimated Glomerular Filtration Rate:  On admission, estimated GFR based on a Creatinine of 0.65 mg/dl:   Using CKD-EPI = 106.5 mL/min/1.73m2   Using MDRD = 133.6 mL/min/1.73m2  Most recent estimated GFR, based on a Creatinine of 0.76 mg/dl on 8/14/2017:   Using CKD-EPI = 99.9 mL/min/1.73m2   Using MDRD = 111.6 mL/min/1.73m2 Assessment:     Primary Rehabilitation Diagnosis  1. Impaired Mobility and ADLs  2. S/P Removal of infected graft; Repair of left superficial femoral artery; Repair of left common femoral artery (7/25/2017 - Dr. Jennifer Kent); History of sepsis associated with infection of vascular bypass graft     Comorbidities       Patient Active Problem List   Diagnosis Code    Benign hypertensive heart disease with systolic congestive heart failure, NYHA class 2  I11.0, I50.20    DDD (degenerative disc disease), lumbar M51.36    Erectile dysfunction N52.9    Spinal stenosis of lumbar region with radiculopathy M48.06, M54.16    Hereditary peripheral neuropathy G60.9    Aortoiliac occlusive disease  I74.09    Atherosclerosis of native artery of both lower extremities with intermittent claudication  I70.213    Peripheral artery disease  I73.9    Coronary artery disease involving native coronary artery of native heart I25.10    Chronic atrial fibrillation  I48.2    Acute blood loss as cause of postoperative anemia D62    Impaired mobility and ADLs Z74.09    Anticoagulated on Coumadin Z51.81, Z79.01    Ischemic cardiomyopathy I25.5    Chronic systolic heart failure  X38.97    Carotid artery disease  I77.9    Hyperammonemia  E72.20    Dyslipidemia E78.5    Euthyroid sick syndrome E07.81    Aftercare following surgery of the circulatory system Z48.812    Status post femoral-popliteal bypass surgery Z95.828    History of cardioversion Z98.890    Critical ischemia of lower extremity I99.8    Chronic ulcer of right foot  L97.519    History of vitamin D deficiency Z86.39    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 Justification for continued stay: Good progression towards established rehabilitation goals.     2. Medical Issues being followed closely:    [x]  Fall and safety precautions     [x]  Wound Care     [x] Bowel and Bladder Function     [x]  Fluid Electrolyte and Nutrition Balance     [x]  Pain Control      3. Issues that 24 hour rehabilitation nursing is following:    [x]  Fall and safety precautions     [x]  Wound Care     [x]  Bowel and Bladder Function     [x]  Fluid Electrolyte and Nutrition Balance     [x]  Pain Control      [x]  Assistance with and education on in-room safety with transfers to and from the bed, wheelchair, toilet and shower. 4. Acute rehabilitation plan of care:    [x]  Continue current care and rehab. [x]  Physical Therapy           [x]  Occupational Therapy           []  Speech Therapy     []  Hold Rehab until further notice     5. Medications:    [x]  MAR Reviewed     [x]  Continue Present Medications     6. DVT Prophylaxis:      []  Lovenox     []  Unfractionated Heparin     [x]  Coumadin     []  NOAC     []  VASQUEZ Stockings     []  Sequential Compression Device     []  None     7. Orders:   > S/P Removal of infected graft; Repair of left superficial femoral artery; Repair of left common femoral artery (7/25/2017 - Dr. Starlett Lefort);  History of sepsis associated with infection of vascular bypass graft   > (+) numbness from the middle of the left lower leg downwards - improved with activity   > On 8/10/2017, Vascular Surgery ([address] [place] [address]) was informed of the numbness and erythema of the shin of the left leg and she was aware of it; no diagnostic studies for now; Vascular Surgery will be evaluating the patient tomorrow to see if he needs further surgical intervention   > WBC count (8/13/2017) = 12.4   > Continue Cefazolin 2 grams IVPB q 8 hr until 9/13/2017    > Acute Postoperative Blood Loss Anemia   > Hgb/Hct (8/8/2017, on admission) = 8.7/27.0   > Anemia work-up showed serum iron 28, TIBC 211, iron % saturation 13, ferritin 1105, reticulocyte count 8.8   > On 8/8/2017:    > Discontinued FeSO4 325 mg PO once daily with breakfast     > Patient was given Epoetin constance 10,000 units SC x 1 dose   > Hgb/Hct (8/10/2017) = 8.7/26.6    > On 8/10/2017, patient was given Epoetin constance 10,000 units SC x 1 dose   > Hgb/Hct (8/13/2017) = 9.3/29.1    > Hgb/Hct (8/14/2017) = 10.1/30.4     > Benign hypertensive heart disease with chronic systolic heart failure   > On 8/8/2017, increased Losartan from 25 mg to 50 mg PO once daily (6AM)   > Continue:    > Diltiazem IR 30 mg PO TID AC and q HS    > Furosemide 20 mg PO once daily (6AM)    > Losartan 50 mg PO once daily (6AM)    > Metoprolol tartrate 12.5 mg PO q 12 hr (9AM, 9PM)    > Paroxysmal atrial fibrillation, presently normal sinus rhythm, anticoagulated on Coumadin   > On 8/8/2017, discontinued Enoxaparin 40 mg SC q 24 hr   > Continue:    > Amiodarone 200 mg PO once daily    > Metoprolol tartrate 12.5 mg PO q 12 hr (9AM, 9PM)   > Target INR = 2 to 3   > INR 1.7   > Patient received Coumadin 4 mg PO last night   > Discontinue Coumadin as per Vascular Surgery as they are planning on surgical intervention on Friday     > Chronic ulcer of right heel    > Podiatry consult (Dr. Sudhakar Solorzano) called for recommendations on care   > Continue:    > Rigid heel suspension boots on both feet when supine in bed    > Collagenase ointment applied to right heel ulcer once daily, then cover with dry sterile dressing    > Analgesia   > Discontinue:    > Oxycodone 20 mg PO TID (8AM, 12PM, 4PM)    > Oxycodone 15-20 mg PO q 4 hr PRN for pain level greater than 4/10 (from 8PM to 4AM only)    > Norco 10/325 1 tab PO q 4 hr PRN for pain level greater than 4/10   > Hydromorphone 1 mg IV 2 times daily (6PM, 10PM)   > Continue Acetaminophen 650 mg PO q 4 hr PRN for pain level less than 5/10      > Na (8/8/2017, on admission) = 129   > On 8/8/2017, patient was started on NaCl 1 gram PO BID   > Na (8/12/2017) = 132   > Na (8/13/2017) = 130   > Na (8/14/2017) = 131    > Mg (8/8/2017, on admission) = 1.6   > On 8/8/2017, patient was started on Mg(OH)2 400 mg PO once daily   > Mg (8/14/2017) = 1.8      8. Patient's progress in rehabilitation and medical issues discussed with the patient. All questions answered to the best of my ability. Care plan discussed with patient and nurse.     9. Discharge Planning:   > For discharge / transfer to [facility] as an inpatient on Thursday (8/17/2017)   > Patient will be put on the schedule for removal of infected right bypass graft with jump bypass and left AKA on Friday (8/18/2017)      Signed:    Harshil Wright MD    August 14, 2017

## 2017-08-14 NOTE — PROGRESS NOTES
Problem: Mobility Impaired (Adult and Pediatric)  Goal: *Acute Goals and Plan of Care (Insert Text)  Physical Therapy Goals  Short Term Goals  Initiated 8/8/2017 and to be accomplished within 5-7 day(s) (8/15/2017)  1. Patient will move from supine to sit and sit to supine , scoot up and down and roll side to side in bed with modified independence. 2. Patient will transfer from bed to chair and chair to bed with contact guard assist using the least restrictive device. 3. Patient will perform sit to stand with contact guard assist.  4. Patient will ambulate with contact guard assist for 150 feet with the least restrictive device. 5. Patient will ascend/descend 4-6 stairs with B handrail(s) with minimal assistance/contact guard assist.    Long Term Goals  Initiated 8/8/2017 and to be accomplished within 10-14 day(s) (8/22/2017)  1. Patient will move from supine to sit and sit to supine , scoot up and down and roll side to side in bed with independence. 2. Patient will transfer from bed to chair and chair to bed with modified independence using the least restrictive device. 3. Patient will perform sit to stand with modified independence. 4. Patient will ambulate with modified independence for 300 feet with the least restrictive device. 5. Patient will ascend/descend 12 stairs with B handrail(s) with modified independence. PHYSICAL THERAPY DAILY NOTE  Patient Name:Mj Garza  Time In: 1100  Time Out: 1200  Patient Seen For: Patient education; Therapeutic exercise   Patient missed 3 units of skilled intervention secondary to decreased ability to safely participate. Diagnosis: Sepsis  Sepsis associated with internal vascular access, subsequent encounter ([facility] [address] Sepsis associated with internal vascular access [facility])  Precautions: Fall Risk Precautions, Wound Vac to L groin     Subjective: Pt reports at start of session, \"I'm not doing too good,\" indicating worsening L LE pain/discomfort.    Pt reports he has not yet spoken with vascular physician. Pt appears confused and notes he at times is \"seeing people,\" who are not present. Pt indicates he feels his pain medication is affecting his cognition. However, when PT reviewed pt's chart, he had refused his AM pain medication and not received it. Due to pt's worsening complaints, PT spoke with unit physician re: concerns. Pt also noted he saw a \"green toyota,\" in the hallway while participating in seated exercises in the gym. Pain at start of tx: 8-9/10 L foot/LE  Pain at stop of tx: 8-9/10 L foot/LE     Patient identified with name and : yes         Objective:      During AM treatment, pt's vital signs were assessed and WNL - (please refer to chart doc flow-sheet for details). GROSS ASSESSMENT Daily Assessment     Pt's L foot is reddish-purple/dusky in appearance with pt very tender to palpation/light touch. Pt continues to have what appears to be a small scabbed over area on the medial aspect of his foot and a large dark area over his L heel. TRANSFERS Daily Assessment     NT secondary to worsening symptoms of pain and pt confusion - not safe/appropriate to challenged. GAIT Daily Assessment     NT secondary to worsening symptoms of pain and pt confusion - not safe/appropriate to challenged. WHEELCHAIR MOBILITY Daily Assessment     Pt propelled w/c to gym from his room with supervision and max encouragement/verbal cues for more efficient propulsion. LOWER EXTREMITY EXERCISES Daily Assessment     Extremity: Both  Exercise Type #1: Seated lower extremity strengthening  Sets Performed: 3  Reps Performed: 10   Isometric HIp Add x 5\" holds  Hip Abd/ER with red theraband resistance              Assessment: Pt demonstrates worsening confusion and pain limiting his ability tot safely participate in skilled intervention.              Plan of Care: Awaiting feedback from consult with vascular physician to determine further POC as pt is demonstrating progressive decline in tolerance to therapy.      Dina Barcenas, PT, DPT  8/14/2017

## 2017-08-14 NOTE — ROUTINE PROCESS
SHIFT CHANGE NOTE FOR [facility]    Bedside and Verbal shift change report given to Kerry Goncalves RN  (oncoming nurse) by Rosezella Epley, RN   (offgoing nurse). Report included the following information SBAR, Kardex, MAR and Recent Results. Situation:   Code Status: Full Code   Reason for Admission: sepsis  Hospital Day: 7   Problem List:   Hospital Problems  Date Reviewed: 8/11/2017          Codes Class Noted POA    Chronic ulcer of right heel ([facility] [address] (Chronic) ICD-10-CM: L97.419  ICD-9-CM: 707.14  8/8/2017 Yes        Acute blood loss as cause of postoperative anemia ICD-10-CM: D62  ICD-9-CM: 285.1  7/25/2017 Yes        Aftercare following surgery of the circulatory system ICD-10-CM: Z48.812  ICD-9-CM: V58.73  7/25/2017 Yes    Overview Addendum 8/7/2017  2:20 PM by Griselda Kennedy MD     S/P Removal of infected graft; Repair of left superficial femoral artery; Repair of left common femoral artery (7/25/2017 - Dr. Jennifer Kent)             Impaired mobility and ADLs ICD-10-CM: Z74.09  ICD-9-CM: 799.89  7/24/2017 Yes        Infection of vascular bypass graft ([facility] ICD-10-CM: T82. 7XXA  ICD-9-CM: 996.62  7/24/2017 Yes    Overview Signed 8/7/2017  2:19 PM by Griselda Kennedy MD     Left lower extremity             * (Principal)Sepsis associated with internal vascular access [facility]) ICD-10-CM: T82. 7XXA, A41.9  ICD-9-CM: 996.62, 038.9  7/24/2017 Yes        Anticoagulated on Coumadin (Chronic) ICD-10-CM: Z51.81, Z79.01  ICD-9-CM: V58.83, V58.61  Unknown Yes        Chronic atrial fibrillation (HCC) (Chronic) ICD-10-CM: I48.2  ICD-9-CM: 427.31  Unknown Yes        Benign hypertensive heart disease with systolic congestive heart failure, NYHA class 2 (HCC) (Chronic) ICD-10-CM: I11.0, I50.20  ICD-9-CM: 402.11, 428.20, 428.0  1/26/2015 Yes              Background:   Past Medical History:   Past Medical History:   Diagnosis Date    Acute blood loss as cause of postoperative anemia 4/4/2017    Anticoagulated on Coumadin  Aortoiliac occlusive disease ([facility] [address] 1/25/2017    Atherosclerosis of native artery of both lower extremities with intermittent claudication ([facility] [address] 1/25/2017    Benign hypertensive heart disease with systolic congestive heart failure, NYHA class 2 ([facility] [address] 1/26/2015    Carotid artery disease (HCC)     Chronic anemia     Chronic atrial fibrillation (HCC)     Chronic systolic heart failure (HCC)     Chronic ulcer of right foot ([facility] [address] 4/4/2017    Chronic ulcer of right heel ([facility] [address] 8/8/2017    Coronary artery disease involving native coronary artery of native heart 3/15/2017    Successful stenting of Cx (Xience LESLEY) and RCA (Xience LESLEY) to 0% by Dr. Imelda De La Cruz on 3/15/17.  DDD (degenerative disc disease), lumbar 1/26/2015    Dyslipidemia     Erectile dysfunction 7/5/2016    Euthyroid sick syndrome 4/6/2017    Hereditary peripheral neuropathy 11/15/2016    History of cardioversion 4/18/2017    S/P Synchronized external cardioversion (4/18/2017 - Dr. Ariane Curiel)    History of vitamin D deficiency 4/22/2017    Vitamin D 25-Hydroxy (4/22/2017) = 12.0; Vitamin D 25-Hydroxy (5/26/2017) = 38.7    [facility] vascular [facility] graft [facility] 7/24/2017    Left lower extremity    Ischemic cardiomyopathy     Peripheral artery disease ([facility] 1/25/2017    Spinal stenosis of lumbar region with radiculopathy 5/4/2015    Dr. Di Nugent      Patient taking anticoagulants no    Patient has a defibrillator: no     Assessment:   Changes in Assessment throughout shift: no     Patient has central line: no Reasons if yes: Insertion date: Last dressing date:   Patient has Jiang Cath: no Reasons if yes:     Insertion date:     Last Vitals:     Vitals:    08/13/17 2044 08/13/17 2200 08/14/17 0537 08/14/17 0647   BP: 126/61 122/63 122/63 126/60   Pulse: 65 68 67 66   Resp: 18      Temp: 97 °F (36.1 °C)      SpO2: 98%      Weight:            PAIN    Pain Assessment    Pain Intensity 1: 0 (08/14/17 0400) Pain Intensity 1: 2 (12/29/14 1104)    Pain Location 1: Foot Pain Location 1: Abdomen    Pain Intervention(s) 1: Medication (see MAR) Pain Intervention(s) 1: Medication (see MAR)  Patient Stated Pain Goal: 0 Patient Stated Pain Goal: 0  o Intervention effective: no   o Other actions taken for pain: pain medicine     Skin Assessment  Skin color Skin Color: Appropriate for ethnicity  Condition/Temperature Skin Condition/Temp: Dry  Integrity Skin Integrity: Wound (add Wound LDA)  Turgor Turgor: Non-tenting  Weekly Pressure Ulcer Documentation  Pressure  Injury Documentation: No Pressure Injury Noted-Pressure Ulcer Prevention Initiated  Wound Prevention & Protection Methods  Orientation of wound Orientation of Wound Prevention: Posterior  Location of Prevention Location of Wound Prevention: Sacrum/Coccyx  Dressing Present Dressing Present : No  Dressing Status Dressing Status: Intact  Wound Offloading Wound Offloading (Prevention Methods): Bed, pressure redistribution/air     INTAKE/OUPUT    Date 08/13/17 0700 - 08/14/17 0659 08/14/17 0700 - 08/15/17 0659   Shift 3596-2708 8572-4390 24 Hour Total 3067-8701 5649-8099 24 Hour Total   I  N  T  A  K  E   P.O. 660  660         P. O. 660  660       Shift Total  (mL/kg) 660  (8.8)  660  (8.8)      O  U  T  P  U  T   Urine  (mL/kg/hr) 1775  (2)  1775  (1)         Urine Voided 1775 1775         Urine Occurrence(s) 4 x 8 x 12 x       Stool            Stool Occurrence(s) 1 x 0 x 1 x       Shift Total  (mL/kg) 1775  (23.7)  1775  (23.7)      NET -1115  -1115      Weight (kg) 75 75 75 75 75 75       Recommendations:  1. Patient needs and requests: education    2. Diet: cardiac    3. Pending tests/procedures: no     4. Functional Level/Equipment: 1 x person assist    5. Estimated Discharge Date: TDB Posted on Whiteboard in Patients Room: no     [facility] Safety Check    A safety check occurred in the patient's room between off going nurse and oncoming nurse listed above.     The safety check included the below items  Area Items   H  High Alert Medications - Verify all high alert medication drips (heparin, PCA, etc.)   E  Equipment - Suction is set up for ALL patients (with cailin)  - Red plugs utilized for all equipment (IV pumps, etc.)  - WOWs wiped down at end of shift.  - Room stocked with oxygen, suction, and other unit-specific supplies   A  Alarms - Bed alarm is set for fall risk patients  - Ensure chair alarm is in place and activated if patient is up in a chair   L  Lines - Check IV for any infiltration  - Jiang bag is empty if patient has a Jiang   - Tubing and IV bags are labeled   S  Safety   - Room is clean, patient is clean, and equipment is clean. - Hallways are clear from equipment besides carts. - Fall bracelet on for fall risk patients  - Ensure room is clear and free of clutter  - Suction is set up for ALL patients (with cailin)  - Hallways are clear from equipment besides carts.    - Isolation precautions followed, supplies available outside room, sign posted

## 2017-08-14 NOTE — PROGRESS NOTES
Sw provided verbal clinical update to pt's insurance for auth extension. Sw will follow to determine approval status.

## 2017-08-14 NOTE — PROGRESS NOTES
Spoke with Josemanuel Ballard, [address] [facility] nurse. Plan is to change the wound vac dressing tomorrow, 8/15. Patient refused his 0800 pain medication, states it makes him \"loopy\". Patient very disoriented this am. No sense of time or place. VSS. Will monitor.

## 2017-08-14 NOTE — PROGRESS NOTES
Vascular Surgery Progress Note    Admit Date: 2017  POD * No surgery found *    Procedure:  * No surgery found *      Subjective:     Patient lying upside down in bed. Appears very uncomfortable. Lucid on exam today but there are reports of intermittent confusion and hallucinations per staff. Patient states left leg pain is no longer tolerable and he is ready to proceed with surgery to help relieve pain. Objective:     Blood pressure 127/67, pulse 66, temperature 97 °F (36.1 °C), resp. rate 14, weight 165 lb 5.5 oz (75 kg), SpO2 100 %. Temp (24hrs), Av.9 °F (36.6 °C), Min:97 °F (36.1 °C), Max:99.2 °F (37.3 °C)      Physical Exam:  GENERAL: alert, cooperative, oriented currently to person and place. appropriate, LUNG:  no resp difficulty, ABDOMEN:  ND, right chest with multiple small abscesses overlying bypass graft with purulent drainage noted and EXTREMITIES:  no BLE edema. LLE with dusky appearance on anterior to medial calf. Left heel black. small scab on medial aspect of foot with surrounding dusky appearance. +dependent rubor. Labs: Results:       Chemistry Recent Labs      17   0617   0610   GLU  93  89  89   NA  131*  130*  132*   K  4.8  4.4  4.5   CL  95*  94*  95*   CO2  27  27  28   BUN  18  14  12   CREA  0.76  0.68  0.84   CA  9.1  9.1  9.1   AGAP  9  9  9   BUCR  24*  21*  14   AP   --   616*  623*   TP   --   7.6  7.6   ALB   --   2.7*  2.6*   GLOB   --   4.9*  5.0*   AGRAT   --   0.6*  0.5*      CBC w/Diff Recent Labs      17   06   WBC   --   12.4   RBC   --   3.25*   HGB  10.1*  9.3*   HCT  30.4*  29.1*   PLT   --   469*   GRANS   --   74*   LYMPH   --   15*   EOS   --   2      Microbiology No results for input(s): CULT in the last 72 hours.    Coagulation Recent Labs      17   0640  17   06   PTP  19.5*  22.8*   INR  1.7*  2.1*         Data Review: images and reports reviewed    Assessment:     Principal Problem:    Sepsis associated with internal vascular access ([facility] (7/24/2017)    Active Problems:    Benign hypertensive heart disease with systolic congestive heart failure, NYHA class 2 (HCC) (1/26/2015)      Chronic atrial fibrillation ([facility]) ()      Acute blood loss as cause of postoperative anemia (7/25/2017)      Impaired mobility and ADLs (7/24/2017)      Anticoagulated on Coumadin ()      Aftercare following surgery of the circulatory system (7/25/2017)      Overview: S/P Removal of infected graft; Repair of left superficial femoral artery;       Repair of left common femoral artery (7/25/2017 - Dr. Oviedo Ast)      Infection of vascular bypass graft ([facility] (7/24/2017)      Overview: Left lower extremity      Chronic ulcer of right heel ([facility] (8/8/2017)        Plan/Recommendations/Medical Decision Making:      Patient discussed with Dr Kehinde Cruz. Plan for removal of infected right bypass graft with jump bypass and left AKA on Friday. Hold Coumadin. Dr Kehinde Cruz spoke with daughter, [facility], and she is agreeable to plan. Dr Kehinde Cruz to speak with patient in AM as well. Continue pain control and ABX.     Elvia Harris  [phone]

## 2017-08-14 NOTE — ROUTINE PROCESS
SHIFT CHANGE NOTE FOR [facility]    Bedside and Verbal shift change report given to Tee Friend RN  (oncoming nurse) by Jose Taylor RN   (offgoing nurse). Report included the following information SBAR, Kardex, MAR and Recent Results. Situation:   Code Status: Full Code   Reason for Admission: sepsis  Hospital Day: 7   Problem List:   Hospital Problems  Date Reviewed: 8/14/2017          Codes Class Noted POA    Chronic ulcer of right heel ([facility] [address] (Chronic) ICD-10-CM: L97.419  ICD-9-CM: 707.14  8/8/2017 Yes        Acute blood loss as cause of postoperative anemia ICD-10-CM: D62  ICD-9-CM: 285.1  7/25/2017 Yes        Aftercare following surgery of the circulatory system ICD-10-CM: Z48.812  ICD-9-CM: V58.73  7/25/2017 Yes    Overview Addendum 8/7/2017  2:20 PM by Dylan Cintron MD     S/P Removal of infected graft; Repair of left superficial femoral artery; Repair of left common femoral artery (7/25/2017 - Dr. Tracy Lashmeet)             Impaired mobility and ADLs ICD-10-CM: Z74.09  ICD-9-CM: 799.89  7/24/2017 Yes        Infection of vascular bypass graft ([facility] ICD-10-CM: T82. 7XXA  ICD-9-CM: 996.62  7/24/2017 Yes    Overview Signed 8/7/2017  2:19 PM by Dylan Cintron MD     Left lower extremity             * (Principal)Sepsis associated with internal vascular access [facility]) ICD-10-CM: T82. 7XXA, A41.9  ICD-9-CM: 996.62, 038.9  7/24/2017 Yes        Anticoagulated on Coumadin (Chronic) ICD-10-CM: Z51.81, Z79.01  ICD-9-CM: V58.83, V58.61  Unknown Yes        Chronic atrial fibrillation (HCC) (Chronic) ICD-10-CM: I48.2  ICD-9-CM: 427.31  Unknown Yes        Benign hypertensive heart disease with systolic congestive heart failure, NYHA class 2 (HCC) (Chronic) ICD-10-CM: I11.0, I50.20  ICD-9-CM: 402.11, 428.20, 428.0  1/26/2015 Yes              Background:   Past Medical History:   Past Medical History:   Diagnosis Date    Acute blood loss as cause of postoperative anemia 4/4/2017    Anticoagulated on Coumadin     Aortoiliac occlusive disease ([facility] 1/25/2017    Atherosclerosis of native artery of both lower extremities with intermittent claudication ([facility] [address] 1/25/2017    Benign hypertensive heart disease with systolic congestive heart failure, NYHA class 2 ([address] 1/26/2015    Carotid artery disease (HCC)     Chronic anemia     Chronic atrial fibrillation (HCC)     Chronic systolic heart failure (HCC)     Chronic ulcer of right foot ([address] 4/4/2017    Chronic ulcer of right heel ([facility] 8/8/2017    Coronary artery disease involving native coronary artery of native heart 3/15/2017    Successful stenting of Cx (Xience LESLEY) and RCA (Xience LESLEY) to 0% by Dr. Denis Powell on 3/15/17.  DDD (degenerative disc disease), lumbar 1/26/2015    Dyslipidemia     Erectile dysfunction 7/5/2016    Euthyroid sick syndrome 4/6/2017    Hereditary peripheral neuropathy 11/15/2016    History of cardioversion 4/18/2017    S/P Synchronized external cardioversion (4/18/2017 - Dr. Chantel Martin)    History of vitamin D deficiency 4/22/2017    Vitamin D 25-Hydroxy (4/22/2017) = 12.0; Vitamin D 25-Hydroxy (5/26/2017) = 38.7    Infection of vascular bypass graft ([facility] [address] 7/24/2017    Left lower extremity    Ischemic cardiomyopathy     Peripheral artery disease ([facility] [address] 1/25/2017    Spinal stenosis of lumbar region with radiculopathy 5/4/2015    Dr. Jose Alejandro Gabriel      Patient taking anticoagulants no    Patient has a defibrillator: no     Assessment:   Changes in Assessment throughout shift: no     Patient has central line: no Reasons if yes: Insertion date: Last dressing date:   Patient has Jiang Cath: no Reasons if yes:     Insertion date:     Last Vitals:     Vitals:    08/14/17 0751 08/14/17 1135 08/14/17 1208 08/14/17 1550   BP: 157/68 124/70 127/67 136/65   Pulse: 70 67 66 67   Resp: 18 14 18   Temp: 98.2 °F (36.8 °C) 97 °F (36.1 °C)  98 °F (36.7 °C)   SpO2: 90% 100%  99%   Weight:            PAIN    Pain Assessment    Pain Intensity 1: 5 (08/14/17 1830) Pain Intensity 1: 2 (12/29/14 1105)    Pain Location 1: Foot Pain Location 1: Abdomen    Pain Intervention(s) 1: Medication (see MAR) Pain Intervention(s) 1: Medication (see MAR)  Patient Stated Pain Goal: 0 Patient Stated Pain Goal: 0  o Intervention effective: no   o Other actions taken for pain: pain medicine     Skin Assessment  Skin color Skin Color: Appropriate for ethnicity  Condition/Temperature Skin Condition/Temp: Warm  Integrity Skin Integrity: Incision (comment)  Turgor Turgor: Non-tenting  Weekly Pressure Ulcer Documentation  Pressure  Injury Documentation: No Pressure Injury Noted-Pressure Ulcer Prevention Initiated  Wound Prevention & Protection Methods  Orientation of wound Orientation of Wound Prevention: Posterior  Location of Prevention Location of Wound Prevention: Sacrum/Coccyx  Dressing Present Dressing Present : No  Dressing Status Dressing Status: Intact  Wound Offloading Wound Offloading (Prevention Methods): Bed, pressure redistribution/air     INTAKE/OUPUT    Date 08/13/17 1900 - 08/14/17 0659 08/14/17 0700 - 08/15/17 0659   Shift 7038-5038 24 Hour Total 4737-7543 5290-8521 24 Hour Total   I  N  T  A  K  E   P.O.  660 360  360      P. O.  660 360  360    Shift Total  (mL/kg)  660  (8.8) 360  (4.8)  360  (4.8)   O  U  T  P  U  T   Urine  (mL/kg/hr)  1775 1000  (1.1)  1000      Urine Voided  1775 1000  1000      Urine Occurrence(s) 8 x 12 x 2 x  2 x    Stool           Stool Occurrence(s) 0 x 1 x 1 x  1 x    Shift Total  (mL/kg)  1775  (23.7) 1000  (13.3)  1000  (13.3)   NET  -1115 -640  -640   Weight (kg) 75 75 75 75 75       Recommendations:  1. Patient needs and requests: education    2. Diet: cardiac    3. Pending tests/procedures: no     4. Functional Level/Equipment: 1 x person assist    5.  Estimated Discharge Date: TDB Posted on Whiteboard in Patients Room: no     HEALS Safety Check    A safety check occurred in the patient's room between off going nurse and oncoming nurse listed above. The safety check included the below items  Area Items   H  High Alert Medications - Verify all high alert medication drips (heparin, PCA, etc.)   E  Equipment - Suction is set up for ALL patients (with cailin)  - Red plugs utilized for all equipment (IV pumps, etc.)  - WOWs wiped down at end of shift.  - Room stocked with oxygen, suction, and other unit-specific supplies   A  Alarms - Bed alarm is set for fall risk patients  - Ensure chair alarm is in place and activated if patient is up in a chair   L  Lines - Check IV for any infiltration  - Jiang bag is empty if patient has a Jiang   - Tubing and IV bags are labeled   S  Safety   - Room is clean, patient is clean, and equipment is clean. - Hallways are clear from equipment besides carts. - Fall bracelet on for fall risk patients  - Ensure room is clear and free of clutter  - Suction is set up for ALL patients (with cailin)  - Hallways are clear from equipment besides carts.    - Isolation precautions followed, supplies available outside room, sign posted

## 2017-08-14 NOTE — PROGRESS NOTES
Problem: Self Care Deficits Care Plan (Adult)  Goal: *Acute Goals and Plan of Care (Insert Text)  Long Term Goals (to be met upon discharge date) in order to increase pts functional independence and safety, and decrease burden of care:  1. Pt will perform grooming with independence. 2. Pt will perform UB bathing with modified independence. 3. Pt will perform LB bathing with modified independence. 4. Pt will perform tub/shower transfer with modified independence. 5. Pt will perform UB dressing with independence. 6. Pt will perform LB dressing with modified independence. 7. Pt will perform toileting task with modified independence. 8. Pt will perform toilet transfer with modified independence. Short Term Weekly Goals for (2017 to 8/15/2017) in order to increase pts functional independence and safety, and decrease burden of care:   1. Pt will perform grooming with independence. 2. Pt will perform UB bathing with modified independence. 3. Pt will perform LB bathing with supervision. 4. Pt will perform tub/shower transfer with Min A.  5. Pt will perform UB dressing with independence. 6. Pt will perform LB dressing with Min A.  7. Pt will perform toileting task with Min A.  8. Pt will perform toilet transfer with Min A.   OCCUPATIONAL THERAPY DAILY NOTE  Patient Name:Mj Caputo  Time Spent With Patient  Time In: 1330  Time Out: 1430        Medical Diagnosis:  Sepsis  Sepsis associated with internal vascular access, subsequent encounter ([facility] [address] Sepsis associated with internal vascular access ([facility] [address]      Pain at start of tx:910  Pain at stop of tx:10     Patient identified with name and :yes  Subjective: Pt c/o pain B's LE more in the L then R. Pt also c/o pain in L shin and heel to the touch. Objective: Pt seen supine in bed upon arrival in room treatment. Pt replied he was hurting when asked how he was doing. Then stats 'I don't remember if I did get my pain before or after lunch'.  Nursing states pt received pain med's after lunch. See below for treatment and pt left in bed with call bell and phone in place. THERAPEUTIC EXERCISE Daily Assessment     Pt preforms UE strengthening with two pound dowel. Preforming 3x12 reps in all plane with rest between sets. UPPER BODY DRESSING Daily Assessment     Upper Body Dressing   Dressing Assistance : 7 (Independent)  Comments: Pt seated EOB doff/jasmeet shirt independently       LOWER BODY DRESSING Daily Assessment     Lower Body Dressing   Dressing Assistance : 6 (Modified independent)  Leg Crossed Method Used: No  Position Performed: Seated edge of bed  Comments: Pt disconnect wound vac than thread L&R LE into pants legs with min verbal cuing to thread the wound vac cord through the pants legs. pt then recline on bed bridging hips and donning pants to waist.Pt bringing LE toward chest in long sitting donning and doffing sock independently. MOBILITY/TRANSFERS Daily Assessment      Pt preform bed mobility to EOB with supervision. Assessment: Pt participated well although complaining of pain in lower extremity. Pt demonstrates dressing to be mod independent at [facility]. Plan of Care: Con't plan of care to maximize functional activities self care.      Luiza TATE   8/14/2017

## 2017-08-14 NOTE — PROGRESS NOTES
Problem: Mobility Impaired (Adult and Pediatric)  Goal: *Acute Goals and Plan of Care (Insert Text)  Physical Therapy Goals  Short Term Goals  Initiated 8/8/2017 and to be accomplished within 5-7 day(s) (8/15/2017)  1. Patient will move from supine to sit and sit to supine , scoot up and down and roll side to side in bed with modified independence. 2. Patient will transfer from bed to chair and chair to bed with contact guard assist using the least restrictive device. 3. Patient will perform sit to stand with contact guard assist.  4. Patient will ambulate with contact guard assist for 150 feet with the least restrictive device. 5. Patient will ascend/descend 4-6 stairs with B handrail(s) with minimal assistance/contact guard assist.    Long Term Goals  Initiated 8/8/2017 and to be accomplished within 10-14 day(s) (8/22/2017)  1. Patient will move from supine to sit and sit to supine , scoot up and down and roll side to side in bed with independence. 2. Patient will transfer from bed to chair and chair to bed with modified independence using the least restrictive device. 3. Patient will perform sit to stand with modified independence. 4. Patient will ambulate with modified independence for 300 feet with the least restrictive device. 5. Patient will ascend/descend 12 stairs with B handrail(s) with modified independence. PT Attempt Note:     8953     Pt presents supine in bed at start of scheduled treatment session. When PT greets pt, he appears subdued and reports he is not doing well. Pt articulates that he was seen for a vascular consult and he will be having further intervention to address progressively worsening L LE pain. Pt articulates, as he understood the conversation, that he will be seen in the hospital for further procedures stating, \"they're going to try another graft and, if that doesn't work out, I guess I'll be minus one leg. \"  Pt reports not feeling up for skilled PT intervention at this time. Will re-attempt as pt is able to participate. Will re-assess goals and POC.      Yonathan Freeman, PT, DPT

## 2017-08-15 PROBLEM — M79.606 ISCHEMIC REST PAIN OF LOWER EXTREMITY: Status: ACTIVE | Noted: 2017-08-14

## 2017-08-15 PROBLEM — I99.8 ISCHEMIC REST PAIN OF LOWER EXTREMITY: Status: ACTIVE | Noted: 2017-08-14

## 2017-08-15 LAB
BASOPHILS # BLD AUTO: 0 K/UL (ref 0–0.06)
BASOPHILS # BLD: 0 % (ref 0–3)
DIFFERENTIAL METHOD BLD: ABNORMAL
EOSINOPHIL # BLD: 0 K/UL (ref 0–0.4)
EOSINOPHIL NFR BLD: 0 % (ref 0–5)
ERYTHROCYTE [DISTWIDTH] IN BLOOD BY AUTOMATED COUNT: 18.7 % (ref 11.6–14.5)
HCT VFR BLD AUTO: 29.8 % (ref 36–48)
HGB BLD-MCNC: 9.9 G/DL (ref 13–16)
INR PPP: 1.9 (ref 0.8–1.2)
LYMPHOCYTES # BLD AUTO: 18 % (ref 20–51)
LYMPHOCYTES # BLD: 2.1 K/UL (ref 0.8–3.5)
MCH RBC QN AUTO: 29 PG (ref 24–34)
MCHC RBC AUTO-ENTMCNC: 33.2 G/DL (ref 31–37)
MCV RBC AUTO: 87.4 FL (ref 74–97)
MONOCYTES # BLD: 1.4 K/UL (ref 0–1)
MONOCYTES NFR BLD AUTO: 12 % (ref 2–9)
NEUTS SEG # BLD: 8.4 K/UL (ref 1.8–8)
NEUTS SEG NFR BLD AUTO: 70 % (ref 42–75)
PLATELET # BLD AUTO: 491 K/UL (ref 135–420)
PLATELET COMMENTS,PCOM: ABNORMAL
PMV BLD AUTO: 8.9 FL (ref 9.2–11.8)
PROTHROMBIN TIME: 21 SEC (ref 11.5–15.2)
RBC # BLD AUTO: 3.41 M/UL (ref 4.7–5.5)
RBC MORPH BLD: ABNORMAL
RBC MORPH BLD: ABNORMAL
WBC # BLD AUTO: 11.9 K/UL (ref 4.6–13.2)

## 2017-08-15 PROCEDURE — 74011250637 HC RX REV CODE- 250/637: Performed by: INTERNAL MEDICINE

## 2017-08-15 PROCEDURE — 36415 COLL VENOUS BLD VENIPUNCTURE: CPT | Performed by: INTERNAL MEDICINE

## 2017-08-15 PROCEDURE — 97110 THERAPEUTIC EXERCISES: CPT

## 2017-08-15 PROCEDURE — 65310000000 HC RM PRIVATE REHAB

## 2017-08-15 PROCEDURE — 85025 COMPLETE CBC W/AUTO DIFF WBC: CPT | Performed by: INTERNAL MEDICINE

## 2017-08-15 PROCEDURE — 74011250636 HC RX REV CODE- 250/636: Performed by: INTERNAL MEDICINE

## 2017-08-15 PROCEDURE — 85610 PROTHROMBIN TIME: CPT | Performed by: INTERNAL MEDICINE

## 2017-08-15 PROCEDURE — 97535 SELF CARE MNGMENT TRAINING: CPT

## 2017-08-15 RX ORDER — HYDROMORPHONE HYDROCHLORIDE 1 MG/ML
1 INJECTION, SOLUTION INTRAMUSCULAR; INTRAVENOUS; SUBCUTANEOUS 3 TIMES DAILY
Status: DISCONTINUED | OUTPATIENT
Start: 2017-08-15 | End: 2017-08-17 | Stop reason: HOSPADM

## 2017-08-15 RX ORDER — PHYTONADIONE 5 MG/1
2.5 TABLET ORAL
Status: COMPLETED | OUTPATIENT
Start: 2017-08-15 | End: 2017-08-15

## 2017-08-15 RX ADMIN — HYDROCODONE BITARTRATE AND ACETAMINOPHEN 1 TABLET: 10; 325 TABLET ORAL at 13:45

## 2017-08-15 RX ADMIN — DULOXETINE HYDROCHLORIDE 60 MG: 30 CAPSULE, DELAYED RELEASE ORAL at 08:59

## 2017-08-15 RX ADMIN — AMIODARONE HYDROCHLORIDE 200 MG: 200 TABLET ORAL at 09:01

## 2017-08-15 RX ADMIN — ASPIRIN 81 MG 81 MG: 81 TABLET ORAL at 09:01

## 2017-08-15 RX ADMIN — Medication 400 MG: at 09:00

## 2017-08-15 RX ADMIN — DILTIAZEM HYDROCHLORIDE 30 MG: 30 TABLET, FILM COATED ORAL at 12:10

## 2017-08-15 RX ADMIN — DILTIAZEM HYDROCHLORIDE 30 MG: 30 TABLET, FILM COATED ORAL at 22:27

## 2017-08-15 RX ADMIN — POTASSIUM CHLORIDE 20 MEQ: 20 TABLET, EXTENDED RELEASE ORAL at 09:00

## 2017-08-15 RX ADMIN — CEFAZOLIN SODIUM 2 G: 2 SOLUTION INTRAVENOUS at 13:29

## 2017-08-15 RX ADMIN — POTASSIUM CHLORIDE 20 MEQ: 20 TABLET, EXTENDED RELEASE ORAL at 18:04

## 2017-08-15 RX ADMIN — VITAMIN D, TAB 1000IU (100/BT) 1000 UNITS: 25 TAB at 09:00

## 2017-08-15 RX ADMIN — PHYTONADIONE 2.5 MG: 5 TABLET ORAL at 13:31

## 2017-08-15 RX ADMIN — GABAPENTIN 300 MG: 300 CAPSULE ORAL at 13:29

## 2017-08-15 RX ADMIN — HYDROMORPHONE HYDROCHLORIDE 1 MG: 1 INJECTION, SOLUTION INTRAMUSCULAR; INTRAVENOUS; SUBCUTANEOUS at 18:04

## 2017-08-15 RX ADMIN — HYDROCODONE BITARTRATE AND ACETAMINOPHEN 2 TABLET: 10; 325 TABLET ORAL at 04:06

## 2017-08-15 RX ADMIN — LOSARTAN POTASSIUM 50 MG: 50 TABLET ORAL at 05:29

## 2017-08-15 RX ADMIN — HYDROCODONE BITARTRATE AND ACETAMINOPHEN 2 TABLET: 10; 325 TABLET ORAL at 22:27

## 2017-08-15 RX ADMIN — DILTIAZEM HYDROCHLORIDE 30 MG: 30 TABLET, FILM COATED ORAL at 05:31

## 2017-08-15 RX ADMIN — GABAPENTIN 300 MG: 300 CAPSULE ORAL at 05:29

## 2017-08-15 RX ADMIN — ZINC SULFATE 220 MG (50 MG) CAPSULE 220 MG: CAPSULE at 09:01

## 2017-08-15 RX ADMIN — CYANOCOBALAMIN TAB 1000 MCG 1000 MCG: 1000 TAB at 09:00

## 2017-08-15 RX ADMIN — CEFAZOLIN SODIUM 2 G: 2 SOLUTION INTRAVENOUS at 05:31

## 2017-08-15 RX ADMIN — METOPROLOL TARTRATE 12.5 MG: 25 TABLET ORAL at 09:00

## 2017-08-15 RX ADMIN — FUROSEMIDE 20 MG: 20 TABLET ORAL at 05:32

## 2017-08-15 RX ADMIN — Medication 250 MG: at 09:00

## 2017-08-15 RX ADMIN — DOCUSATE SODIUM 100 MG: 100 CAPSULE, LIQUID FILLED ORAL at 09:00

## 2017-08-15 RX ADMIN — HYDROCODONE BITARTRATE AND ACETAMINOPHEN 2 TABLET: 10; 325 TABLET ORAL at 09:01

## 2017-08-15 RX ADMIN — SODIUM CHLORIDE TAB 1 GM 1 G: 1 TAB at 18:04

## 2017-08-15 RX ADMIN — DOCUSATE SODIUM 100 MG: 100 CAPSULE, LIQUID FILLED ORAL at 18:04

## 2017-08-15 RX ADMIN — POLYETHYLENE GLYCOL 3350 17 G: 17 POWDER, FOR SOLUTION ORAL at 18:03

## 2017-08-15 RX ADMIN — ATORVASTATIN CALCIUM 40 MG: 40 TABLET, FILM COATED ORAL at 22:27

## 2017-08-15 RX ADMIN — METOPROLOL TARTRATE 12.5 MG: 25 TABLET ORAL at 22:27

## 2017-08-15 RX ADMIN — Medication 1 CAPSULE: at 09:00

## 2017-08-15 RX ADMIN — SODIUM CHLORIDE TAB 1 GM 1 G: 1 TAB at 09:00

## 2017-08-15 RX ADMIN — CEFAZOLIN SODIUM 2 G: 2 SOLUTION INTRAVENOUS at 22:27

## 2017-08-15 NOTE — PROGRESS NOTES
[address]  [address] [place] [address], [address]     INPATIENT REHABILITATION  DAILY PROGRESS NOTE     Date: 8/15/2017    Name: Eduardo Saavedra Age / Sex: 59 y.o. / male   CSN: [id number] MRN: [id number]   [facility] Date: 8/7/2017 Length of Stay: 8 days     Primary Rehab Diagnosis: Impaired Mobility and ADLs secondary to:  1. S/P Removal of infected graft; Repair of left superficial femoral artery; Repair of left common femoral artery (7/25/2017 - Dr. Atul Kirkland)  2. History of sepsis associated with infection of vascular bypass graft      Subjective:     Patient seen and examined. Blood pressure controlled. Team conference was held at bedside this AM.     Patient's Complaint:   No significant medical complaints     Pain Control: ongoing significant pain in which is stable and controlled by current meds      Objective:     Vital Signs:  Patient Vitals for the past 24 hrs:   BP Temp Pulse Resp SpO2   08/15/17 0800 123/64 97.1 °F (36.2 °C) 73 18 100 %   08/15/17 0529 136/80 - 72 - -   08/14/17 2136 128/60 97.4 °F (36.3 °C) 70 18 97 %   08/14/17 1550 136/65 98 °F (36.7 °C) 67 18 99 %   08/14/17 1208 127/67 - 66 - -   08/14/17 1135 124/70 97 °F (36.1 °C) 67 14 100 %        Physical Examination:  GENERAL SURVEY: Patient is awake, alert, oriented x 3, sitting comfortably on the chair, not in acute respiratory distress.   HEENT: pale palpebral conjunctivae, anicteric sclerae, no nasoaural discharge, moist oral mucosa  NECK: supple, no jugular venous distention, no palpable lymph nodes  CHEST/LUNGS: symmetrical chest expansion, good air entry, clear breath sounds  HEART: adynamic precordium, good S1 S2, no S3, regular rhythm, no murmurs  ABDOMEN: flat, bowel sounds appreciated, soft, non-tender  EXTREMITIES: (+) WoundVac on left groin, (+) erythema on shin area of left leg, (+) ~1.5 cm x ~1.5 cm ulcer on right heel, (+) <1 cm x <1 cm wound above lateral malleolus of right ankle, pale nailbeds, trace bipedal edema, full and equal pulses, no calf tenderness   NEUROLOGICAL EXAM: The patient is awake, alert and oriented x3, able to answer questions fairly appropriately, able to follow 1 and 2 step commands. Able to tell time from the wall clock. Cranial nerves II-XII are grossly intact. No gross sensory deficit except for numbness from the middle of the left lower leg downwards.   Motor strength is 4+/5 on BUE, 4+/5 on the RLE, 3+/5 on the left hip, 4-/5 on the left knee and left ankle.     Incision(s): healing well, clean, dry, and intact        Current Medications:  Current Facility-Administered Medications   Medication Dose Route Frequency    HYDROcodone-acetaminophen (NORCO)  mg tablet 1-2 Tab  1-2 Tab Oral Q4H PRN    HYDROmorphone (PF) (DILAUDID) injection 1 mg  1 mg IntraVENous BID    pneumococcal 23-valent (PNEUMOVAX 23) injection 0.5 mL  0.5 mL IntraMUSCular PRIOR TO DISCHARGE    losartan (COZAAR) tablet 50 mg  50 mg Oral DAILY    sodium chloride tablet 1 g  1 g Oral BID WITH MEALS    magnesium oxide (MAG-OX) tablet 400 mg  400 mg Oral DAILY    collagenase (SANTYL) 250 unit/gram ointment   Topical DAILY    alteplase (CATHFLO) 1 mg in sterile water (preservative free) 1 mL injection  1 mg InterCATHeter PRN    acetaminophen (TYLENOL) tablet 650 mg  650 mg Oral Q4H PRN    docusate sodium (COLACE) capsule 100 mg  100 mg Oral BID    bisacodyl (DULCOLAX) tablet 10 mg  10 mg Oral Q48H PRN    amiodarone (CORDARONE) tablet 200 mg  200 mg Oral DAILY    ascorbic acid (vitamin C) (VITAMIN C) tablet 250 mg  250 mg Oral DAILY WITH BREAKFAST    aspirin chewable tablet 81 mg  81 mg Oral DAILY WITH BREAKFAST    ceFAZolin (ANCEF) 2g IVPB in 50 mL D5W  2 g IntraVENous Q8H    cholecalciferol (VITAMIN D3) tablet 1,000 Units  1,000 Units Oral DAILY    cyanocobalamin tablet 1,000 mcg  1,000 mcg Oral DAILY    dilTIAZem (CARDIZEM) IR tablet 30 mg  30 mg Oral AC&HS    DULoxetine (CYMBALTA) capsule 60 mg  60 mg Oral DAILY    furosemide (LASIX) tablet 20 mg  20 mg Oral DAILY    gabapentin (NEURONTIN) capsule 300 mg  300 mg Oral Q8H    lactobacillus sp. 50 billion cpu (BIO-K PLUS) capsule 1 Cap  1 Cap Oral DAILY    metoprolol tartrate (LOPRESSOR) tablet 12.5 mg  12.5 mg Oral Q12H    polyethylene glycol (MIRALAX) packet 17 g  17 g Oral DAILY    potassium chloride (K-DUR, KLOR-CON) SR tablet 20 mEq  20 mEq Oral BID    zinc sulfate (ZINCATE) capsule 220 mg  1 Cap Oral DAILY    atorvastatin (LIPITOR) tablet 40 mg  40 mg Oral QHS       Allergies: Allergies   Allergen Reactions    Morphine Other (comments)     Patient gets confused with morphine.        Functional Progress:    OCCUPATIONAL THERAPY    ON ADMISSION MOST RECENT   Eating  Functional Level: 6   Eating  Functional Level: 6     Grooming      Grooming        Bathing  Functional Level: 4   Bathing  Functional Level: 4     Upper Body Dressing  Functional Level: 5   Upper Body Dressing  Functional Level: 5     Lower Body Dressing  Functional Level: 3   Lower Body Dressing  Functional Level: 3     Toileting  Functional Level: 2   Toileting  Functional Level: 3     Toilet Transfers  Toilet Transfer Score: 3   Toilet Transfers  Toilet Transfer Score: 4     Tub /Shower Transfers  Tub/Shower Transfer Score: 3   Tub/Shower Transfers  Tub/Shower Transfer Score: 3       Legend:   7 - Independent   6 - Modified Independent   5 - Standby Assistance / Supervision / Set-up   4 - Minimum Assistance / Contact Guard Assistance   3 - Moderate Assistance   2 - Maximum Assistance   1 - Total Assistance / Dependent       Lab/Data Review:  Recent Results (from the past 24 hour(s))   PROTHROMBIN TIME + INR    Collection Time: 08/15/17  6:05 AM   Result Value Ref Range    Prothrombin time 21.0 (H) 11.5 - 15.2 sec    INR 1.9 (H) 0.8 - 1.2     CBC WITH AUTOMATED DIFF    Collection Time: 08/15/17  6:05 AM   Result Value Ref Range    WBC 11.9 4.6 - 13.2 K/uL RBC 3.41 (L) 4.70 - 5.50 M/uL    HGB 9.9 (L) 13.0 - 16.0 g/dL    HCT 29.8 (L) 36.0 - 48.0 %    MCV 87.4 74.0 - 97.0 FL    MCH 29.0 24.0 - 34.0 PG    MCHC 33.2 31.0 - 37.0 g/dL    RDW 18.7 (H) 11.6 - 14.5 %    PLATELET 937 (H) 353 - 420 K/uL    MPV 8.9 (L) 9.2 - 11.8 FL    NEUTROPHILS 70 42 - 75 %    LYMPHOCYTES 18 (L) 20 - 51 %    MONOCYTES 12 (H) 2 - 9 %    EOSINOPHILS 0 0 - 5 %    BASOPHILS 0 0 - 3 %    ABS. NEUTROPHILS 8.4 (H) 1.8 - 8.0 K/UL    ABS. LYMPHOCYTES 2.1 0.8 - 3.5 K/UL    ABS. MONOCYTES 1.4 (H) 0 - 1.0 K/UL    ABS. EOSINOPHILS 0.0 0.0 - 0.4 K/UL    ABS. BASOPHILS 0.0 0.0 - 0.06 K/UL    DF MANUAL      PLATELET COMMENTS INCREASED PLATELETS      RBC COMMENTS ANISOCYTOSIS  1+        RBC COMMENTS POLYCHROMASIA  FEW           Estimated Glomerular Filtration Rate:  On admission, estimated GFR based on a Creatinine of 0.65 mg/dl:   Using CKD-EPI = 106.5 mL/min/1.73m2   Using MDRD = 133.6 mL/min/1.73m2  Most recent estimated GFR, based on a Creatinine of 0.76 mg/dl on 8/14/2017:   Using CKD-EPI = 99.9 mL/min/1.73m2   Using MDRD = 111.6 mL/min/1.73m2       Assessment:     Primary Rehabilitation Diagnosis  1. Impaired Mobility and ADLs  2. S/P Removal of infected graft; Repair of left superficial femoral artery; Repair of left common femoral artery (7/25/2017 - Dr. Anu Mosley);  History of sepsis associated with infection of vascular bypass graft     Comorbidities        Benign hypertensive heart disease with systolic congestive heart failure, NYHA class 2  I11.0, I50.20    DDD (degenerative disc disease), lumbar M51.36    Erectile dysfunction N52.9    Spinal stenosis of lumbar region with radiculopathy M48.06, M54.16    Hereditary peripheral neuropathy G60.9    Aortoiliac occlusive disease  I74.09    Atherosclerosis of native artery of both lower extremities with intermittent claudication  I70.213    Peripheral artery disease  I73.9    Coronary artery disease involving native coronary artery of native heart I25.10    Chronic atrial fibrillation  I48.2    Acute blood loss as cause of postoperative anemia D62    Impaired mobility and ADLs Z74.09    Anticoagulated on Coumadin Z51.81, Z79.01    Ischemic cardiomyopathy I25.5    Chronic systolic heart failure  R69.98    Carotid artery disease  I77.9    Hyperammonemia  E72.20    Dyslipidemia E78.5    Euthyroid sick syndrome E07.81    Aftercare following surgery of the circulatory system Z48.812    Status post femoral-popliteal bypass surgery Z95.828    History of cardioversion Z98.890    Critical ischemia of lower extremity I99.8    Chronic ulcer of right foot  L97.519    History of vitamin D deficiency Z86.39    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    Chronic anemia D64.9    Chronic ulcer of right heel  L97.419    Ischemic rest pain of lower extremity I73.9        Plan:     1. Justification for continued stay: Good progression towards established rehabilitation goals. 2. Medical Issues being followed closely:    [x]  Fall and safety precautions     [x]  Wound Care     [x]  Bowel and Bladder Function     [x]  Fluid Electrolyte and Nutrition Balance     [x]  Pain Control      3. Issues that 24 hour rehabilitation nursing is following:    [x]  Fall and safety precautions     [x]  Wound Care     [x]  Bowel and Bladder Function     [x]  Fluid Electrolyte and Nutrition Balance     [x]  Pain Control      [x]  Assistance with and education on in-room safety with transfers to and from the bed, wheelchair, toilet and shower. 4. Acute rehabilitation plan of care:    [x]  Continue current care and rehab. [x]  Physical Therapy           [x]  Occupational Therapy           []  Speech Therapy     []  Hold Rehab until further notice     5. Medications:    [x]  MAR Reviewed     [x]  Continue Present Medications     6.  DVT Prophylaxis:      []  Lovenox     []  Unfractionated Heparin     [x]  Coumadin     []  NOAC     []  VASQUEZ Stockings []  Sequential Compression Device     []  None     7. Orders:   > S/P Removal of infected graft; Repair of left superficial femoral artery; Repair of left common femoral artery (7/25/2017 - Dr. Ar Bello);  History of sepsis associated with infection of vascular bypass graft   > (+) numbness from the middle of the left lower leg downwards   > On 8/10/2017, Vascular Surgery ([address] [place] [address]) was informed of the numbness and erythema of the shin of the left leg and she was aware of it; no diagnostic studies for now; Vascular Surgery will be evaluating the patient tomorrow to see if he needs further surgical intervention   > On 8/12/2017, patient complained of worsening pain of LLE at rest   > WBC count (8/13/2017) = 12.4   > On 8/14/2017, patient was seen and evaluated by [address] [place] [address] [place] and discussed with Dr. Ar Bello and it was felt that the patient has Ischemic rest pain of the left lower extremity and the patient will be scheduled for removal of infected right bypass graft with jump bypass and left AKA on Friday (8/18/2017)   > WBC count (8/15/2017) = 11.9   > Continue Cefazolin 2 grams IVPB q 8 hr until 9/13/2017    > Acute Postoperative Blood Loss Anemia   > Hgb/Hct (8/8/2017, on admission) = 8.7/27.0   > Anemia work-up showed serum iron 28, TIBC 211, iron % saturation 13, ferritin 1105, reticulocyte count 8.8   > On 8/8/2017:    > Discontinued FeSO4 325 mg PO once daily with breakfast     > Patient was given Epoetin constance 10,000 units SC x 1 dose   > Hgb/Hct (8/10/2017) = 8.7/26.6    > On 8/10/2017, patient was given Epoetin constance 10,000 units SC x 1 dose   > Hgb/Hct (8/13/2017) = 9.3/29.1    > Hgb/Hct (8/14/2017) = 10.1/30.4    > Hgb/Hct (8/15/2017) = 9.9/29.8    > Benign hypertensive heart disease with chronic systolic heart failure   > On 8/8/2017, increased Losartan from 25 mg to 50 mg PO once daily (6AM)   > Continue:    > Diltiazem IR 30 mg PO TID AC and q HS    > Furosemide 20 mg PO once daily (6AM)    > Losartan 50 mg PO once daily (6AM)    > Metoprolol tartrate 12.5 mg PO q 12 hr (9AM, 9PM)    > Paroxysmal atrial fibrillation, presently normal sinus rhythm, anticoagulated on Coumadin   > On 8/8/2017, discontinued Enoxaparin 40 mg SC q 24 hr   > Continue:    > Amiodarone 200 mg PO once daily    > Metoprolol tartrate 12.5 mg PO q 12 hr (9AM, 9PM)   > Target INR = 2 to 3   > INR 1.9   > Patient received no Coumadin last night (re: discontinued as per Vascular Surgery as they are planning on surgical intervention on Friday)   > Phytonadione 2.5 mg PO x 1 dose     > Chronic ulcer of right heel    > Podiatry consult (Dr. Paul Fuentes) called for recommendations on care   > Continue:    > Rigid heel suspension boots on both feet when supine in bed    > Collagenase ointment applied to right heel ulcer once daily, then cover with dry sterile dressing    > Analgesia   > Continue:     > Norco 10/325 1 tab PO q 4 hr PRN for pain level greater than 4/10    > Increase Hydromorphone from 1 mg IV 2 times daily (6PM, 10PM) to 1 mg IV 3 times daily (6PM, 10PM, 2AM)    > Acetaminophen 650 mg PO q 4 hr PRN for pain level less than 5/10    > Na (8/8/2017, on admission) = 129   > On 8/8/2017, patient was started on NaCl 1 gram PO BID   > Na (8/12/2017) = 132   > Na (8/13/2017) = 130   > Na (8/14/2017) = 131    > Mg (8/8/2017, on admission) = 1.6   > On 8/8/2017, patient was started on Mg(OH)2 400 mg PO once daily   > Mg (8/14/2017) = 1.8      8. Patient's progress in rehabilitation and medical issues discussed with the patient. All questions answered to the best of my ability. Care plan discussed with patient and nurse.     9. Discharge Planning:   > For discharge / transfer to [facility] as an inpatient on Thursday (8/17/2017)   > Patient will be put on the schedule for removal of infected right bypass graft with jump bypass and left AKA on Friday (8/18/2017)      Signed:    Dori Posey GEOVANNY Poole MD    August 15, 2017

## 2017-08-15 NOTE — PROGRESS NOTES
Patient had some periods of confusion last night but was easily reoriented when necessary .  Bed alarm and pad alarm were on and functional.

## 2017-08-15 NOTE — PROGRESS NOTES
Problem: Mobility Impaired (Adult and Pediatric)  Goal: *Acute Goals and Plan of Care (Insert Text)  Physical Therapy Goals  Short Term Goals  Initiated 8/8/2017 and to be accomplished within 5-7 day(s) (8/15/2017)  To be determined pending medical follow-up/intervention. Long Term Goals  Initiated 8/8/2017 and to be accomplished within 10-14 day(s) (8/22/2017)  To be determined pending medical follow-up/intervention. PT Attempt Note     9553     Patient presents seated in w/c gazing out window in his room. Pt notes he sees, \"men in hard hats,\" working outside. However, there are no workers outside pt's window. Attempted to re-direct pt and educate him that there are no people visible outside his window and that this may be a side effect of his medication. However, pt is not able to be re-directed. Pt not appropriate for skilled intervention secondary to visual hallucinations. Will re-attempt as pt is appropriate for skilled intervention. Pt missed 2 units of skilled intervention as a result.     Yonathan Freeman, PT, DPT

## 2017-08-15 NOTE — PROGRESS NOTES
Problem: Mobility Impaired (Adult and Pediatric)  Goal: *Acute Goals and Plan of Care (Insert Text)  Physical Therapy Goals  Short Term Goals  Initiated 8/8/2017 and to be accomplished within 5-7 day(s) (8/15/2017)  To be determined pending medical follow-up/intervention. Long Term Goals  Initiated 8/8/2017 and to be accomplished within 10-14 day(s) (8/22/2017)  To be determined pending medical follow-up/intervention. PHYSICAL THERAPY WEEKLY NOTE  Patient Name:Mj Connell  Time In: 4576  Time Out: 2321  Patient Seen For: Balance activities; Patient education  Pt missed 4 units of skilled intervention secondary to significant pain to the point of distracting pt and pt appearing confused and unable to tolerate intervention at this time. Diagnosis: Sepsis  Sepsis associated with internal vascular access, subsequent encounter ([facility] [address]  T82. 7XXA, I99.8  INFECTED BYPASS GRAFT/ISCHEMIA LEFT LEG Sepsis associated with internal vascular access [facility])  Precautions: Fall Risk Precautions, wound vac to L groin     Subjective: Pt continues to appear subdued and reports significant L LE pain. During conversation, pt at times veers off topic and becomes confused. When performing seated exercises in courtyard speaking about construction that is not occurring. Pt is agreeable to attempt to participate in skilled treatment. However, with gentle seated therapeutic exercises, pt becomes overwhelmed with pain and articulates he feels he can not do any more. PT offered to assist pt to bed but pt declined requesting to lay on mat in gym. However, pt moved around frequently in discomfort and then reports he would prefer to lay down in his room. As PT was leaving the pt's room after assisting him to bed with call bell in reach, he states, \"have a good evening and be safe going home. \"  Pt was re-oriented to time of day using clock in room with PT educating pt that it was 1115 in the morning.   Pt is pleasant and jokingly states, \"well be safe anyway getting wherever you're going. \"     Pain at start of tx: 10/10  Pain at stop of tx: 10/10     Patient identified with name and : yes         Objective:       BED/MAT MOBILITY Daily Assessment    Admission: Supervision for bed mobility. Supine <> Sit: Pt requires supervision on mat table and bed for safety and wound-vac line management. TRANSFERS Daily Assessment    Admission: Minimal to Moderate assistance for functional transfers. Other: stand step with RW  Transfer Assistance : 4 (Minimal assistance)  Sit to Stand Assistance: Minimal assistance   Pt performed transfers with minimal L LE weight bearing secondary to pain requiring min assist for balance and safety. GAIT Daily Assessment    Admission:  Minimal assistance with RW for 12 ft x 2 repetitions Not challenged secondary to not safe to assess. STEPS or STAIRS Daily Assessment    NT on evaluation - pt had progressed to participate in and negotiate a flight of stairs with B UE support on hand rails with min assist. Not challenged - not safe to assess. BALANCE Daily Assessment     Sitting - Static: Good (unsupported)  Sitting - Dynamic: Good (unsupported)  Standing - Static: Fair  Standing - Dynamic : Impaired          LOWER EXTREMITY EXERCISES Daily Assessment     Extremity: Both  Exercise Type #1: Seated lower extremity strengthening   3 Sets of 10 Repetitions:  Isometric Hip Add x 5\" holds  10 Repetitions:  Red theraband resisted Hip Abd/ER - pt not able to tolerate after 10 repetitions. Assessment: Pt is not able to participate in functional skilled PT at this time secondary to pain. Awaiting medical intervention. Pt has not progressed to achieve goals set upon initial evaluation secondary to worsening L LE pain. Plan of Care: Awaiting further feedback/medical management as pt is not currently able to tolerate skilled intervention.      Ward Tanner, PT, DPT  8/15/2017

## 2017-08-15 NOTE — WOUND CARE
Patient seen by wound care at this time. Negative pressure therapy dressing intact. Wound care will change dressing I the am. Patient complaint of pain this am due to left lower leg, bedside nurse notified. Will assess patient's wound in am with vac dressing change.

## 2017-08-15 NOTE — ROUTINE PROCESS
SHIFT CHANGE NOTE FOR [facility]    Bedside and Verbal shift change report given to Lizzy Castillo  (oncoming nurse) by Derrell De Jesus RN   (offgoing nurse). Report included the following information SBAR, Kardex, MAR and Recent Results. Situation:   Code Status: Full Code   Reason for Admission: sepsis  Hospital Day: 8   Problem List:   Hospital Problems  Date Reviewed: 8/14/2017          Codes Class Noted POA    Chronic ulcer of right heel ([facility] [address] [address] (Chronic) ICD-10-CM: L97.419  ICD-9-CM: 707.14  8/8/2017 Yes        Acute blood loss as cause of postoperative anemia ICD-10-CM: D62  ICD-9-CM: 285.1  7/25/2017 Yes        Aftercare following surgery of the circulatory system ICD-10-CM: Z48.812  ICD-9-CM: V58.73  7/25/2017 Yes    Overview Addendum 8/7/2017  2:20 PM by Gonzalez Milligan MD     S/P Removal of infected graft; Repair of left superficial femoral artery; Repair of left common femoral artery (7/25/2017 - Dr. Areli Canela)             Impaired mobility and ADLs ICD-10-CM: Z74.09  ICD-9-CM: 799.89  7/24/2017 Yes        Infection of vascular bypass graft ([facility] ICD-10-CM: T82. 7XXA  ICD-9-CM: 996.62  7/24/2017 Yes    Overview Signed 8/7/2017  2:19 PM by Gonzalez Milligan MD     Left lower extremity             * (Principal)Sepsis associated with internal vascular access [facility]) ICD-10-CM: T82. 7XXA, A41.9  ICD-9-CM: 996.62, 038.9  7/24/2017 Yes        Anticoagulated on Coumadin (Chronic) ICD-10-CM: Z51.81, Z79.01  ICD-9-CM: V58.83, V58.61  Unknown Yes        Chronic atrial fibrillation (HCC) (Chronic) ICD-10-CM: I48.2  ICD-9-CM: 427.31  Unknown Yes        Benign hypertensive heart disease with systolic congestive heart failure, NYHA class 2 (HCC) (Chronic) ICD-10-CM: I11.0, I50.20  ICD-9-CM: 402.11, 428.20, 428.0  1/26/2015 Yes              Background:   Past Medical History:   Past Medical History:   Diagnosis Date    Acute blood loss as cause of postoperative anemia 4/4/2017    Anticoagulated on Coumadin     Aortoiliac occlusive disease ([facility] [address] 1/25/2017    Atherosclerosis of native artery of both lower extremities with intermittent claudication ([facility] [address] 1/25/2017    Benign hypertensive heart disease with systolic congestive heart failure, NYHA class 2 ([facility] [address] 1/26/2015    Carotid artery disease (HCC)     Chronic anemia     Chronic atrial fibrillation (HCC)     Chronic systolic heart failure (HCC)     Chronic ulcer of right foot ([facility] [address] 4/4/2017    Chronic ulcer of right heel ([facility] [address] 8/8/2017    Coronary artery disease involving native coronary artery of native heart 3/15/2017    Successful stenting of Cx (Xience LESLEY) and RCA (Xience LESLEY) to 0% by Dr. Sudhakar Titus on 3/15/17.  DDD (degenerative disc disease), lumbar 1/26/2015    Dyslipidemia     Erectile dysfunction 7/5/2016    Euthyroid sick syndrome 4/6/2017    Hereditary peripheral neuropathy 11/15/2016    History of cardioversion 4/18/2017    S/P Synchronized external cardioversion (4/18/2017 - Dr. Madelyn Long)    History of vitamin D deficiency 4/22/2017    Vitamin D 25-Hydroxy (4/22/2017) = 12.0; Vitamin D 25-Hydroxy (5/26/2017) = 38.7    Infection of vascular bypass graft ([facility] [address] 7/24/2017    Left lower extremity    Ischemic cardiomyopathy     Peripheral artery disease ([facility] [address] 1/25/2017    Spinal stenosis of lumbar region with radiculopathy 5/4/2015    Dr. Demi [facility]      Patient taking anticoagulants no    Patient has a defibrillator: no     Assessment:   Changes in Assessment throughout shift: no     Patient has central line: no Reasons if yes: Insertion date: Last dressing date:   Patient has Jiang Cath: no Reasons if yes:     Insertion date:     Last Vitals:     Vitals:    08/14/17 1208 08/14/17 1550 08/14/17 2136 08/15/17 0529   BP: 127/67 136/65 128/60 136/80   Pulse: 66 67 70 72   Resp:  18 18    Temp:  98 °F (36.7 °C) 97.4 °F (36.3 °C)    SpO2:  99% 97%    Weight:            PAIN    Pain Assessment    Pain Intensity 1: 3 (08/15/17 4215) Pain Intensity 1: 2 (12/29/14 1105)    Pain Location 1: Leg Pain Location 1: Abdomen    Pain Intervention(s) 1: Medication (see MAR) Pain Intervention(s) 1: Medication (see MAR)  Patient Stated Pain Goal: 0 Patient Stated Pain Goal: 0  o Intervention effective: no   o Other actions taken for pain: pain medicine     Skin Assessment  Skin color Skin Color: Appropriate for ethnicity  Condition/Temperature Skin Condition/Temp: Dry, Warm  Integrity Skin Integrity: Incision (comment), Wound (add Wound LDA)  Turgor Turgor: Non-tenting  Weekly Pressure Ulcer Documentation  Pressure  Injury Documentation: No Pressure Injury Noted-Pressure Ulcer Prevention Initiated  Wound Prevention & Protection Methods  Orientation of wound Orientation of Wound Prevention: Posterior  Location of Prevention Location of Wound Prevention: Sacrum/Coccyx  Dressing Present Dressing Present : No  Dressing Status Dressing Status: Intact  Wound Offloading Wound Offloading (Prevention Methods): Bed, pressure redistribution/air, Chair cushion, Elevate heels, Heel boots     INTAKE/OUPUT    Date 08/14/17 0700 - 08/15/17 0659 08/15/17 0700 - 08/16/17 0659   Shift 0029-9452 2542-5376 24 Hour Total 3817-6593 6701-1119 24 Hour Total   I  N  T  A  K  E   P.O. 360  360         P. O. 360  360       Shift Total  (mL/kg) 360  (4.8)  360  (4.8)      O  U  T  P  U  T   Urine  (mL/kg/hr) 1000  (1.1) 2650 3650         Urine Voided 1000 2650 3650         Urine Occurrence(s) 2 x 2 x 4 x       Emesis/NG output  0 0         Emesis  0 0       Stool  0 0         Stool Occurrence(s) 1 x 1 x 2 x         Stool  0 0       Shift Total  (mL/kg) 1000  (13.3) 2650  (35.3) 3650  (48.7)      NET -[phone]      Weight (kg) 75 75 75 75 75 75       Recommendations:  1. Patient needs and requests: education    2. Diet: cardiac    3. Pending tests/procedures: no     4. Functional Level/Equipment: 1 x person assist    5.  Estimated Discharge Date: TDB Posted on Whiteboard in [address] [place] [address] Room: [facility] Safety Check    A safety check occurred in the patient's room between off going nurse and oncoming nurse listed above. The safety check included the below items  Area Items   H  High Alert Medications - Verify all high alert medication drips (heparin, PCA, etc.)   E  Equipment - Suction is set up for ALL patients (with cailin)  - Red plugs utilized for all equipment (IV pumps, etc.)  - WOWs wiped down at end of shift.  - Room stocked with oxygen, suction, and other unit-specific supplies   A  Alarms - Bed alarm is set for fall risk patients  - Ensure chair alarm is in place and activated if patient is up in a chair   L  Lines - Check IV for any infiltration  - Jiang bag is empty if patient has a Jiang   - Tubing and IV bags are labeled   S  Safety   - Room is clean, patient is clean, and equipment is clean. - Hallways are clear from equipment besides carts. - Fall bracelet on for fall risk patients  - Ensure room is clear and free of clutter  - Suction is set up for ALL patients (with cailin)  - Hallways are clear from equipment besides carts.    - Isolation precautions followed, supplies available outside room, sign posted

## 2017-08-15 NOTE — INTERDISCIPLINARY ROUNDS
[facility] PHYSICAL REHABILITATION  [address] [place] [address], [address]    INPATIENT REHABILITATION  TEAM CONFERENCE SUMMARY     Date of Conference: 8/15/2017    Name: Teresa Curiel Age / Sex: 59 y.o. / male   CSN: [id number] MRN: [id number]   [facility] Date: 8/7/2017 Length of Stay: 8 days     Primary Rehabilitation Diagnosis  1. Impaired Mobility and ADLs  2. S/P Removal of infected graft; Repair of left superficial femoral artery; Repair of left common femoral artery (7/25/2017 - Dr. Cait Garcia);  History of sepsis associated with infection of vascular bypass graft      Comorbidities   Benign hypertensive heart disease with systolic congestive heart failure, NYHA class 2  I11.0, I50.20    DDD (degenerative disc disease), lumbar M51.36    Erectile dysfunction N52.9    Spinal stenosis of lumbar region with radiculopathy M48.06, M54.16    Hereditary peripheral neuropathy G60.9    Aortoiliac occlusive disease  I74.09    Atherosclerosis of native artery of both lower extremities with intermittent claudication  I70.213    Peripheral artery disease  I73.9    Coronary artery disease involving native coronary artery of native heart I25.10    Chronic atrial fibrillation  I48.2    Acute blood loss as cause of postoperative anemia D62    Impaired mobility and ADLs Z74.09    Anticoagulated on Coumadin Z51.81, Z79.01    Ischemic cardiomyopathy I25.5    Chronic systolic heart failure  F36.03    Carotid artery disease  I77.9    Hyperammonemia  E72.20    Dyslipidemia E78.5    Euthyroid sick syndrome E07.81    Aftercare following surgery of the circulatory system Z48.812    Status post femoral-popliteal bypass surgery Z95.828    History of cardioversion Z98.890    Critical ischemia of lower extremity I99.8    Chronic ulcer of right foot  L97.519    History of vitamin D deficiency Z86.39    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    Chronic anemia D64.9    Chronic ulcer of right heel L97.419    Ischemic rest pain of lower extremity I73.9           Therapy:     FIM SCORES Initial Assessment Weekly Progress Assessment 8/15/2017   Eating Functional Level: 6  6 mod independent   Swallowing     Grooming    7-independent   Bathing 4  4 - Min A      Upper Body Dressing Functional Level: 5  Items Applied/Steps Completed: Pullover (4 steps)  Comments: Setup only  7 - Independent   Lower Body Dressing Functional Level: 3  Items Applied/Steps Completed: Elastic waist pants (3 steps), Sock, left (1 step), Sock, right (1 step)  Comments: Pt able to jasmeet socks and thread LEs into shorts while seated on EOB with slight assistance for management of wound vac lines. Pt requires Min-Mod A for standing balance while standing with RW and additional assistance for managing clothing over buttocks due to pt requiring constant BUE support on the walker in standing. 3 - Mod A   Toileting Functional Level: 2  Comments: Based on performance of LB bathing and dressing, pt would require maximal assistance for clothing management and thorough hygiene with Min-Mod A for static standing balance while assistance is provided for clothing. 2 - Max A   Bladder - level of assist 6 5   Bladder - accident frequency score 6 6   Bowel - level of assist 2 6   Bowel - accident frequency score 6     Toilet Transfer [place] Toilet Transfer Score: 3  Comments: Based on bed to w/c transfer, pt would require Mod A to perform stand step transfer to/from [facility] utilizing RW. Mod A required due to impaired balance, posterior lean, decreased strength, and inability to weightbear on LLE. Tub/Shower Transfer [place] Tub/Shower Transfer Score: 3  Comments: Based on bed to w/c transfer, pt would require Mod A for balance to perform stand step transfer using RW due to impaired balance, posterior lean, decreased strength, and inability to weightbear on LLE this session.   3 - Mod A      Comprehension Primary Mode of Comprehension: Auditory  Score: 6 Auditory  6   Expression Primary Mode of Expression: Verbal  Score: 6 Verbal  6   Social Interaction Score: 5 6   Problem Solving Score: 5 6   Memory Score: 5 6     FIM SCORES Initial Assessment Weekly Progress Assessment 8/15/2017   Bed/Chair/Wheelchair Transfers Transfer Type: Other  Other: stand step with RW  Transfer Assistance : 3 (Moderate assistance ) (min-mod assist secondary to L LE pain)  Sit to Stand Assistance:  Moderate assistance (min-mod assist secondary to L LE pain)  Car Transfers: Not tested  Car Type: N/A NT   Bed Mobility Rolling Right 5 (Supervision)   Rolling Left 5 (Supervision)   Supine to Sit 5 (Supervision)   Sit to Stand Moderate assistance (min-mod assist secondary to L LE pain)   Sit to Supine 5 (Supervision)    Rolling Right   NT   Rolling Left   NT   Supine to Sit   NT   Sit to Stand   NT   Sit to Supine   NT      Locomotion (W/C) Able to Propel (ft): 232 feet  Functional Level: 5  Curbs/Ramps Assist Required (FIM Score): 0 (Not tested)  Wheelchair Setup Assist Required : 5 (Supervision/setup)  Wheelchair Management: Manages left brake, Manages right brake Function: Supervision  Setup Assistance      Locomotion (W/C distance) 232 Feet 232 Feet   Locomotion (Walk) 4 (Minimal assistance) NT   Locomotion (Walk dist.) 12 Feet (ft) (x 2 repetitions) NT   Steps/Stairs Steps/Stairs Ambulated (#): 0  Level of Assist : 0 (Not tested)  Rail Use: None NT         Nursing:     Neuro:   A&O x_3-4___                   Respiratory:   [x] WNL   [] O2   [] LPM ______   Other:  Peripheral Vascular:   [] TEDS present   [x] Edema present ____ Grade   Cardiac:   [] WNL   [x] Other  Genitourinary:   [x] continent   [x] incontinent   [] lopez  Abdominal ____8/14___ LBM  GI: ___cardiac regular____ Diet __thin____ Liquids _____ tube feeds  Musculoskeletal: stand/step with r/w____ ROM Transfers _____ Assistive Device Used  __mod__ Level of Assistance  Skin Integumentary:   [] Intact   [x] Not Intact   __________Preventative Measures  Details_wound vac_____________________________________________________________  Pain: [x] Controlled   [] Not Controlled   Pain Meds:   [x] Scheduled   [x] PRN        Registered Dietitian / Nutrition:     Patient Vitals for the past 100 hrs:   % Diet Eaten   08/15/17 0901 100 %   08/14/17 1340 100 %   08/14/17 1012 75 %   08/13/17 1822 100 %   08/13/17 1335 90 %   08/13/17 0900 80 %   08/12/17 1800 100 %   08/12/17 1320 90 %   08/12/17 0915 100 %   08/11/17 1802 100 %   08/11/17 1254 100 %   08/11/17 0900 90 %     Pt little confused at time of visit; could not recall if he ate any food or supplements today. Noted good/excellent meal intake per chart. Pt dropped dentures on floor during visit. Encouraged pt to wash them well prior to continued use; he declined and only rinsed them    Supplements:          [x] Yes: Jl BID   [] No      Amount of supplement consumed:  unable to assess at time of visit      Intake/Output Summary (Last 24 hours) at 08/15/17 1121  Last data filed at 08/15/17 0901   Gross per 24 hour   Intake              420 ml   Output             3050 ml   Net            -2630 ml                                Last bowel movement: 8/13      Interdisciplinary Team Goals:     1. Goal  Encourage pt to participate in mobility as tolerated. Barrier  Pain, Impaired strength, Impaired balance    Intervention  Strength and Balance training, Mobility training     2. Goal  OT: Encourage pt to participate in ADLs and UB strengthening as able. Barrier  Pain in B's LE; Decrease UE strength     Intervention  ADL training; Therapeutic exercise     3. Goal      Barrier      Intervention       4. Goal  Patient will have no falls during rehab stay. Barrier  Cognition, balance, impulsive    Intervention  bed/chair alarms, frequent rounding     5.  Goal  Identify emergent concerns about medical problems continuing    Barrier  Limited insight about parameters of medical circumstances emerging    Intervention  Patient education and support      6. Goal  Po intake of meals will continue to meet >75% of patient estimated nutritional needs within the next 7 days. Outcome:  [x] Met/Ongoing    []  Not Met    [] New/Initial Goal     Barrier  none known     Intervention  modified diet; nutrition supplement       Disposition / Discharge Planning:      Follow-up therapy services:  tbd   DME recommendations:  tbd   Estimated discharge date:  8/22/17   Discharge Location:  home         Interdisciplinary team rounds were held this AM with the following team members:       Name   Physical Therapist    Morelia Richter, PT, DPT   Occupational Therapist    Aylin Schmidt, [facility]   Speech Therapist       Recreational Therapist    Stella Gomes, CTRS   Nursing    Rico Flores, RN   Dietitian    Chante Short RD   Clinical Psychologist    Marques Forte, PhD   Physician    Jame Burkitt, MD        ARNOLD Peacock         Signed:  Jame Burkitt, MD    August 15, 2017

## 2017-08-15 NOTE — PROGRESS NOTES
Problem: Self Care Deficits Care Plan (Adult)  Goal: *Acute Goals and Plan of Care (Insert Text)  Long Term Goals (to be met upon discharge date) in order to increase pts functional independence and safety, and decrease burden of care:  1. Pt will perform grooming with independence. 2. Pt will perform UB bathing with modified independence. 3. Pt will perform LB bathing with modified independence. 4. Pt will perform tub/shower transfer with modified independence. 5. Pt will perform UB dressing with independence. 6. Pt will perform LB dressing with modified independence. 7. Pt will perform toileting task with modified independence. 8. Pt will perform toilet transfer with modified independence. Short Term Weekly Goals for (2017 to 8/15/2017) in order to increase pts functional independence and safety, and decrease burden of care:   1. Pt will perform grooming with independence. 2. Pt will perform UB bathing with modified independence. 3. Pt will perform LB bathing with supervision. 4. Pt will perform tub/shower transfer with Min A.  5. Pt will perform UB dressing with independence. 6. Pt will perform LB dressing with Min A.  7. Pt will perform toileting task with Min A.  8. Pt will perform toilet transfer with Min A. Outcome: Not Progressing Towards Goal  OCCUPATIONAL THERAPY DAILY NOTE  Patient Name:Mj Crawford  Time Spent With Patient  Time In: 1330  Time Out: 1430  -2 units secondary to pain in LLE     Medical Diagnosis:  Sepsis  Sepsis associated with internal vascular access, subsequent encounter ([facility] [address]  T82. 7XXA, I99.8  INFECTED BYPASS GRAFT/ISCHEMIA LEFT LEG Sepsis associated with internal vascular access (HCC)      Pain at start of tx:10/10 in LLE; nursing notified and pain meds given at start of session  Pain at stop of tx: 10/10 in LLE; patient resting at end of session     Patient identified with name and : Yes  Subjective:  \"This leg is just giving me so much trouble; you see how it swelling up more? \"     Objective:  Pt seen for 60 minute 1:1 treatment to address ADLs and therapeutic exercise to increase strength/endurance for functional activities. Pt initially able to fully participate in session but over time and after pain meds given, his pain increased during all tasks with c/o stabbing pain and spasms. He also had constant visual hallucinations including seeing dust enter the room from the closed window and a dump truck filling up with rocks where a building is located. Pt able to perform basic self care tasks but safety is compromised when sudden pain inhibits his focus and functional use of his LLE. 30 minutes of treatment missed due to increasing pain in LLE. THERAPEUTIC EXERCISE Daily Assessment     Patient performed BUE therapeutic exercises in all planes x10 reps each without resistance while seated in chair. No difficulty noted with completing exercises. Patient also performed wheelchair pushups x10. Extra time needed secondary to intermittent pain noted in LLE during exercises. TOILETING Daily Assessment     Toileting  Toileting Assistance (FIM Score): 5 (Supervision) (for frequent pain noted in LLE)       LOWER BODY DRESSING Daily Assessment     Lower Body Dressing   Dressing Assistance : 5 (Supervision)  Leg Crossed Method Used: Yes  Position Performed: Seated in chair  Comments: Pt able to complete task without physical assist but given supervision secondary to intermitttent stabbing pains in his LLE causing pt to lose focus on task. MOBILITY/TRANSFERS Daily Assessment     Functional Transfers  Toilet Transfer : Grab bars;Stand pivot transfer without device  Amount of Assistance Required: 5 (Supervision/setup)      Assessment: Patient motivated to care for himself but limited by constant pain in his LLE and visual hallucinations. Plan of Care: Continue to attempt working toward goals while seated.   Patient awaiting vascular surgery on LLE this week.     Harinder Stover MS OTR/L  8/15/2017

## 2017-08-15 NOTE — ROUTINE PROCESS
SHIFT CHANGE NOTE FOR [facility]    Bedside and Verbal shift change report given to Derrell De Jesus RN (oncoming nurse) by Ceferino Stone RN   (offgoing nurse). Report included the following information SBAR, Kardex, MAR and Recent Results. Situation:   Code Status: Full Code   Reason for Admission: sepsis  Hospital Day: 8   Problem List:   Hospital Problems  Date Reviewed: 8/15/2017          Codes Class Noted POA    Ischemic rest pain of lower extremity ([facility] ICD-10-CM: I73.9  ICD-9-CM: 443.9  8/14/2017 Yes    Overview Signed 8/15/2017 11:09 AM by Gonzalez Milligan MD     Left             Chronic ulcer of right heel ([facility] (Chronic) ICD-10-CM: L97.419  ICD-9-CM: 707.14  8/8/2017 Yes        Acute blood loss as cause of postoperative anemia ICD-10-CM: D62  ICD-9-CM: 285.1  7/25/2017 Yes        Aftercare following surgery of the circulatory system ICD-10-CM: Z48.812  ICD-9-CM: V58.73  7/25/2017 Yes    Overview Addendum 8/7/2017  2:20 PM by Gonzalez Milligan MD     S/P Removal of infected graft; Repair of left superficial femoral artery; Repair of left common femoral artery (7/25/2017 - Dr. Areli Canela)             Impaired mobility and ADLs ICD-10-CM: Z74.09  ICD-9-CM: 799.89  7/24/2017 Yes        Infection of vascular bypass graft ([facility] ICD-10-CM: T82. 7XXA  ICD-9-CM: 996.62  7/24/2017 Yes    Overview Signed 8/7/2017  2:19 PM by Gonzalez Milligan MD     Left lower extremity             * (Principal)Sepsis associated with internal vascular access [facility]) ICD-10-CM: T82. 7XXA, A41.9  ICD-9-CM: 996.62, 038.9  7/24/2017 Yes        Anticoagulated on Coumadin (Chronic) ICD-10-CM: Z51.81, Z79.01  ICD-9-CM: V58.83, V58.61  Unknown Yes        Chronic atrial fibrillation (HCC) (Chronic) ICD-10-CM: I48.2  ICD-9-CM: 427.31  Unknown Yes        Benign hypertensive heart disease with systolic congestive heart failure, NYHA class 2 (HCC) (Chronic) ICD-10-CM: I11.0, I50.20  ICD-9-CM: 402.11, 428.20, 428.0  1/26/2015 Yes Background:   Past Medical History:   Past Medical History:   Diagnosis Date    Acute blood loss as cause of postoperative anemia 4/4/2017    Anticoagulated on Coumadin     Aortoiliac occlusive disease (HCC) 1/25/2017    Atherosclerosis of native artery of both lower extremities with intermittent claudication ([facility] 1/25/2017    Benign hypertensive heart disease with systolic congestive heart failure, NYHA class 2 ([facility] 1/26/2015    Carotid artery disease (HCC)     Chronic anemia     Chronic atrial fibrillation (HCC)     Chronic systolic heart failure (HCC)     Chronic ulcer of right foot ([address] 4/4/2017    Chronic ulcer of right heel ([address] 8/8/2017    Coronary artery disease involving native coronary artery of native heart 3/15/2017    Successful stenting of Cx (Xience LESLEY) and RCA (Xience LESLEY) to 0% by Dr. Felix Roman on 3/15/17.  DDD (degenerative disc disease), lumbar 1/26/2015    Dyslipidemia     Erectile dysfunction 7/5/2016    Euthyroid sick syndrome 4/6/2017    Hereditary peripheral neuropathy 11/15/2016    History of cardioversion 4/18/2017    S/P Synchronized external cardioversion (4/18/2017 - Dr. Anne Nguyen)    History of vitamin D deficiency 4/22/2017    Vitamin D 25-Hydroxy (4/22/2017) = 12.0; Vitamin D 25-Hydroxy (5/26/2017) = 38.7    Infection of vascular bypass graft ([facility] 7/24/2017    Left lower extremity    Ischemic cardiomyopathy     Peripheral artery disease ([facility] 1/25/2017    Spinal stenosis of lumbar region with radiculopathy 5/4/2015    Dr. Evelio Lanier      Patient taking anticoagulants no    Patient has a defibrillator: no     Assessment:   Changes in Assessment throughout shift: no     Patient has central line: no Reasons if yes: Insertion date: Last dressing date:   Patient has Jiang Cath: no Reasons if yes:     Insertion date:     Last Vitals:     Vitals:    08/14/17 2136 08/15/17 0529 08/15/17 0800 08/15/17 1600   BP: 128/60 136/80 123/64 111/61 Pulse: 70 72 73 60   Resp: 18  18 18   Temp: 97.4 °F (36.3 °C)  97.1 °F (36.2 °C) 96.8 °F (36 °C)   SpO2: 97%  100% 96%   Weight:            PAIN    Pain Assessment    Pain Intensity 1: 8 (08/15/17 1800) Pain Intensity 1: 2 (12/29/14 1105)    Pain Location 1: Leg Pain Location 1: Abdomen    Pain Intervention(s) 1: Medication (see MAR) Pain Intervention(s) 1: Medication (see MAR)  Patient Stated Pain Goal: 4 Patient Stated Pain Goal: 0  o Intervention effective: no    o Other actions taken for pain: pain medicine     Skin Assessment  Skin color Skin Color: Appropriate for ethnicity  Condition/Temperature Skin Condition/Temp: Dry, Warm  Integrity Skin Integrity: Incision (comment)  Turgor Turgor: Non-tenting  Weekly Pressure Ulcer Documentation  Pressure  Injury Documentation: No Pressure Injury Noted-Pressure Ulcer Prevention Initiated  Wound Prevention & Protection Methods  Orientation of wound Orientation of Wound Prevention: Posterior  Location of Prevention Location of Wound Prevention: Sacrum/Coccyx  Dressing Present Dressing Present : No  Dressing Status Dressing Status: Intact  Wound Offloading Wound Offloading (Prevention Methods): Bed, pressure redistribution/air, Chair cushion, Wheelchair     INTAKE/OUPUT    Date 08/14/17 1900 - 08/15/17 0659 08/15/17 0700 - 08/16/17 0659   Shift 8871-0648 24 Hour Total 7782-9936 6980-3987 24 Hour Total   I  N  T  A  K  E   P.O.  360 720  720      P. O.  360 720  720    Shift Total  (mL/kg)  360  (4.8) 720  (9.6)  720  (9.6)   O  U  T  P  U  T   Urine  (mL/kg/hr) 2650 3650         Urine Voided 2650 3650         Urine Occurrence(s) 2 x 4 x 4 x  4 x    Emesis/NG output 0 0         Emesis 0 0       Stool 0 0         Stool Occurrence(s) 1 x 2 x 1 x  1 x      Stool 0 0       Shift Total  (mL/kg) 2650  (35.3) 3650  (48.7)      NET -2650 -3290 720  720   Weight (kg) 75 75 75 75 75       Recommendations:  1. Patient needs and requests: pain medicine    2.  Diet: cardiac    3. Pending tests/procedures: no     4. Functional Level/Equipment: 1 x person assist    5. Estimated Discharge Date: TDB Posted on Whiteboard in Patients Room: no     [facility] Safety Check    A safety check occurred in the patient's room between off going nurse and oncoming nurse listed above. The safety check included the below items  Area Items   H  High Alert Medications - Verify all high alert medication drips (heparin, PCA, etc.)   E  Equipment - Suction is set up for ALL patients (with cailin)  - Red plugs utilized for all equipment (IV pumps, etc.)  - WOWs wiped down at end of shift.  - Room stocked with oxygen, suction, and other unit-specific supplies   A  Alarms - Bed alarm is set for fall risk patients  - Ensure chair alarm is in place and activated if patient is up in a chair   L  Lines - Check IV for any infiltration  - Jaing bag is empty if patient has a Jiang   - Tubing and IV bags are labeled   S  Safety   - Room is clean, patient is clean, and equipment is clean. - Hallways are clear from equipment besides carts. - Fall bracelet on for fall risk patients  - Ensure room is clear and free of clutter  - Suction is set up for ALL patients (with cailin)  - Hallways are clear from equipment besides carts.    - Isolation precautions followed, supplies available outside room, sign posted

## 2017-08-16 ENCOUNTER — TELEPHONE (OUTPATIENT)
Dept: VASCULAR SURGERY | Age: 57
End: 2017-08-16

## 2017-08-16 LAB
INR PPP: 1.6 (ref 0.8–1.2)
PROTHROMBIN TIME: 18.8 SEC (ref 11.5–15.2)

## 2017-08-16 PROCEDURE — 77030025414 HC DRSG VAC ASST SMPLC KCON -B

## 2017-08-16 PROCEDURE — 97535 SELF CARE MNGMENT TRAINING: CPT

## 2017-08-16 PROCEDURE — 97605 NEG PRS WND THER DME<=50SQCM: CPT

## 2017-08-16 PROCEDURE — 36415 COLL VENOUS BLD VENIPUNCTURE: CPT | Performed by: INTERNAL MEDICINE

## 2017-08-16 PROCEDURE — 74011250637 HC RX REV CODE- 250/637: Performed by: INTERNAL MEDICINE

## 2017-08-16 PROCEDURE — 97110 THERAPEUTIC EXERCISES: CPT

## 2017-08-16 PROCEDURE — 65310000000 HC RM PRIVATE REHAB

## 2017-08-16 PROCEDURE — 85610 PROTHROMBIN TIME: CPT | Performed by: INTERNAL MEDICINE

## 2017-08-16 PROCEDURE — 74011250636 HC RX REV CODE- 250/636: Performed by: INTERNAL MEDICINE

## 2017-08-16 PROCEDURE — 77030011256 HC DRSG MEPILEX <16IN NO BORD MOLN -A

## 2017-08-16 PROCEDURE — 77030019934 HC DRSG VAC ASST KCON -B

## 2017-08-16 RX ORDER — GABAPENTIN 300 MG/1
300 CAPSULE ORAL EVERY 8 HOURS
Qty: 45 CAP | Refills: 0 | Status: ON HOLD
Start: 2017-08-16 | End: 2017-09-08

## 2017-08-16 RX ORDER — LANOLIN ALCOHOL/MO/W.PET/CERES
400 CREAM (GRAM) TOPICAL DAILY
Qty: 15 TAB | Refills: 0 | Status: ON HOLD
Start: 2017-08-16 | End: 2017-09-08

## 2017-08-16 RX ORDER — LOSARTAN POTASSIUM 50 MG/1
50 TABLET ORAL DAILY
Qty: 15 TAB | Refills: 0 | Status: ON HOLD
Start: 2017-08-16 | End: 2017-09-07 | Stop reason: CLARIF

## 2017-08-16 RX ADMIN — AMIODARONE HYDROCHLORIDE 200 MG: 200 TABLET ORAL at 08:30

## 2017-08-16 RX ADMIN — DILTIAZEM HYDROCHLORIDE 30 MG: 30 TABLET, FILM COATED ORAL at 05:06

## 2017-08-16 RX ADMIN — POTASSIUM CHLORIDE 20 MEQ: 20 TABLET, EXTENDED RELEASE ORAL at 08:29

## 2017-08-16 RX ADMIN — HYDROCODONE BITARTRATE AND ACETAMINOPHEN 2 TABLET: 10; 325 TABLET ORAL at 04:15

## 2017-08-16 RX ADMIN — GABAPENTIN 300 MG: 300 CAPSULE ORAL at 22:49

## 2017-08-16 RX ADMIN — VITAMIN D, TAB 1000IU (100/BT) 1000 UNITS: 25 TAB at 08:30

## 2017-08-16 RX ADMIN — DULOXETINE HYDROCHLORIDE 60 MG: 30 CAPSULE, DELAYED RELEASE ORAL at 08:29

## 2017-08-16 RX ADMIN — HYDROMORPHONE HYDROCHLORIDE 1 MG: 1 INJECTION, SOLUTION INTRAMUSCULAR; INTRAVENOUS; SUBCUTANEOUS at 00:59

## 2017-08-16 RX ADMIN — CEFAZOLIN SODIUM 2 G: 2 SOLUTION INTRAVENOUS at 13:01

## 2017-08-16 RX ADMIN — Medication 400 MG: at 08:30

## 2017-08-16 RX ADMIN — DOCUSATE SODIUM 100 MG: 100 CAPSULE, LIQUID FILLED ORAL at 17:07

## 2017-08-16 RX ADMIN — METOPROLOL TARTRATE 12.5 MG: 25 TABLET ORAL at 20:49

## 2017-08-16 RX ADMIN — ZINC SULFATE 220 MG (50 MG) CAPSULE 220 MG: CAPSULE at 08:30

## 2017-08-16 RX ADMIN — HYDROCODONE BITARTRATE AND ACETAMINOPHEN 2 TABLET: 10; 325 TABLET ORAL at 20:49

## 2017-08-16 RX ADMIN — CEFAZOLIN SODIUM 2 G: 2 SOLUTION INTRAVENOUS at 22:35

## 2017-08-16 RX ADMIN — DOCUSATE SODIUM 100 MG: 100 CAPSULE, LIQUID FILLED ORAL at 08:30

## 2017-08-16 RX ADMIN — LOSARTAN POTASSIUM 50 MG: 50 TABLET ORAL at 07:12

## 2017-08-16 RX ADMIN — SODIUM CHLORIDE TAB 1 GM 1 G: 1 TAB at 17:07

## 2017-08-16 RX ADMIN — Medication 1 CAPSULE: at 08:30

## 2017-08-16 RX ADMIN — GABAPENTIN 300 MG: 300 CAPSULE ORAL at 13:01

## 2017-08-16 RX ADMIN — ASPIRIN 81 MG 81 MG: 81 TABLET ORAL at 08:29

## 2017-08-16 RX ADMIN — HYDROCODONE BITARTRATE AND ACETAMINOPHEN 2 TABLET: 10; 325 TABLET ORAL at 08:38

## 2017-08-16 RX ADMIN — GABAPENTIN 300 MG: 300 CAPSULE ORAL at 05:04

## 2017-08-16 RX ADMIN — DILTIAZEM HYDROCHLORIDE 30 MG: 30 TABLET, FILM COATED ORAL at 22:49

## 2017-08-16 RX ADMIN — ATORVASTATIN CALCIUM 40 MG: 40 TABLET, FILM COATED ORAL at 22:27

## 2017-08-16 RX ADMIN — GABAPENTIN 300 MG: 300 CAPSULE ORAL at 00:58

## 2017-08-16 RX ADMIN — POTASSIUM CHLORIDE 20 MEQ: 20 TABLET, EXTENDED RELEASE ORAL at 17:07

## 2017-08-16 RX ADMIN — SODIUM CHLORIDE TAB 1 GM 1 G: 1 TAB at 08:30

## 2017-08-16 RX ADMIN — HYDROMORPHONE HYDROCHLORIDE 1 MG: 1 INJECTION, SOLUTION INTRAMUSCULAR; INTRAVENOUS; SUBCUTANEOUS at 17:07

## 2017-08-16 RX ADMIN — HYDROCODONE BITARTRATE AND ACETAMINOPHEN 1 TABLET: 10; 325 TABLET ORAL at 15:09

## 2017-08-16 RX ADMIN — CEFAZOLIN SODIUM 2 G: 2 SOLUTION INTRAVENOUS at 05:08

## 2017-08-16 RX ADMIN — DILTIAZEM HYDROCHLORIDE 30 MG: 30 TABLET, FILM COATED ORAL at 12:39

## 2017-08-16 RX ADMIN — COLLAGENASE SANTYL: 250 OINTMENT TOPICAL at 08:30

## 2017-08-16 RX ADMIN — METOPROLOL TARTRATE 12.5 MG: 25 TABLET ORAL at 08:32

## 2017-08-16 RX ADMIN — Medication 250 MG: at 08:30

## 2017-08-16 RX ADMIN — CYANOCOBALAMIN TAB 1000 MCG 1000 MCG: 1000 TAB at 08:30

## 2017-08-16 RX ADMIN — DILTIAZEM HYDROCHLORIDE 30 MG: 30 TABLET, FILM COATED ORAL at 17:06

## 2017-08-16 RX ADMIN — FUROSEMIDE 20 MG: 20 TABLET ORAL at 05:04

## 2017-08-16 NOTE — PROGRESS NOTES
Problem: Mobility Impaired (Adult and Pediatric)  Goal: *Acute Goals and Plan of Care (Insert Text)  Physical Therapy Goals  Short Term Goals  Initiated 8/8/2017, re-assessed 8/15/2017, and to be accomplished within 5-7 day(s) (8/15/2017)  To be determined pending medical follow-up/intervention. Long Term Goals  Initiated 8/8/2017, re-assessed 8/15/2017, and to be accomplished within 10-14 day(s) (8/22/2017)  To be determined pending medical follow-up/intervention. PT Attempt Note:     1400     Pt presents supine in bed appearing ashen. Pt in visible distress and does not open his eyes when PT greets him and asks if she can do anything for him. Pt appears slightly agitated and declines any assistance noting, \"they're gonna come in here anyway. \"  Pt states, \"I'll try for you tomorrow. \"  Pt missed skilled PT intervention secondary to feeling unwell/pain/inabililty to tolerate treatment.      Yonathan Freeman, PT, DPT

## 2017-08-16 NOTE — PROGRESS NOTES
Able to talk with patient and daughter at bedside   Daughter had already spoken with Dr Veronica Flood as well  He will come by tomorrow and discuss options for him for surgery, which will plan for on Friday  They were understanding   Will follow up tomorrow with necessary orders for surgical prep/planning

## 2017-08-16 NOTE — PROGRESS NOTES
[address]  [address] [place] [address], [address]     INPATIENT REHABILITATION  DAILY PROGRESS NOTE     Date: 8/16/2017    Name: Dayo Delcid Age / Sex: 59 y.o. / male   CSN: [id number] MRN: [id number]   [facility] Date: 8/7/2017 Length of Stay: 9 days     Primary Rehab Diagnosis: Impaired Mobility and ADLs secondary to:  1. S/P Removal of infected graft; Repair of left superficial femoral artery; Repair of left common femoral artery (7/25/2017 - Dr. Jovan Lama)  2. History of sepsis associated with infection of vascular bypass graft      Subjective:     Patient seen and examined. Blood pressure controlled. Patient's Complaint:   No significant medical complaints     Pain Control: ongoing significant pain in which is stable and controlled by current meds      Objective:     Vital Signs:  Patient Vitals for the past 24 hrs:   BP Temp Pulse Resp SpO2   08/16/17 0810 138/67 97.7 °F (36.5 °C) 69 17 100 %   08/16/17 0504 122/62 - 72 - -   08/15/17 2227 122/78 - 89 - -   08/15/17 2000 122/78 98.8 °F (37.1 °C) 88 18 96 %   08/15/17 1600 111/61 96.8 °F (36 °C) 60 18 96 %        Physical Examination:  GENERAL SURVEY: Patient is awake, alert, oriented x 3, sitting comfortably on the chair, not in acute respiratory distress.   HEENT: pale palpebral conjunctivae, anicteric sclerae, no nasoaural discharge, moist oral mucosa  NECK: supple, no jugular venous distention, no palpable lymph nodes  CHEST/LUNGS: symmetrical chest expansion, good air entry, clear breath sounds  HEART: adynamic precordium, good S1 S2, no S3, regular rhythm, no murmurs  ABDOMEN: flat, bowel sounds appreciated, soft, non-tender  EXTREMITIES: (+) WoundVac on left groin, (+) erythema on shin area of left leg, (+) ~1.5 cm x ~1.5 cm ulcer on right heel, (+) <1 cm x <1 cm wound above lateral malleolus of right ankle, pale nailbeds, trace bipedal edema, full and equal pulses, no calf tenderness   NEUROLOGICAL EXAM: The patient is awake, alert and oriented x3, able to answer questions fairly appropriately, able to follow 1 and 2 step commands. Able to tell time from the wall clock. Cranial nerves II-XII are grossly intact. No gross sensory deficit except for numbness from the middle of the left lower leg downwards.   Motor strength is 4+/5 on BUE, 4+/5 on the RLE, 3+/5 on the left hip, 4-/5 on the left knee and left ankle.     Incision(s): healing well, clean, dry, and intact        Current Medications:  Current Facility-Administered Medications   Medication Dose Route Frequency    HYDROmorphone (PF) (DILAUDID) injection 1 mg  1 mg IntraVENous TID    HYDROcodone-acetaminophen (NORCO)  mg tablet 1-2 Tab  1-2 Tab Oral Q4H PRN    pneumococcal 23-valent (PNEUMOVAX 23) injection 0.5 mL  0.5 mL IntraMUSCular PRIOR TO DISCHARGE    losartan (COZAAR) tablet 50 mg  50 mg Oral DAILY    sodium chloride tablet 1 g  1 g Oral BID WITH MEALS    magnesium oxide (MAG-OX) tablet 400 mg  400 mg Oral DAILY    collagenase (SANTYL) 250 unit/gram ointment   Topical DAILY    alteplase (CATHFLO) 1 mg in sterile water (preservative free) 1 mL injection  1 mg InterCATHeter PRN    acetaminophen (TYLENOL) tablet 650 mg  650 mg Oral Q4H PRN    docusate sodium (COLACE) capsule 100 mg  100 mg Oral BID    bisacodyl (DULCOLAX) tablet 10 mg  10 mg Oral Q48H PRN    amiodarone (CORDARONE) tablet 200 mg  200 mg Oral DAILY    ascorbic acid (vitamin C) (VITAMIN C) tablet 250 mg  250 mg Oral DAILY WITH BREAKFAST    aspirin chewable tablet 81 mg  81 mg Oral DAILY WITH BREAKFAST    ceFAZolin (ANCEF) 2g IVPB in 50 mL D5W  2 g IntraVENous Q8H    cholecalciferol (VITAMIN D3) tablet 1,000 Units  1,000 Units Oral DAILY    cyanocobalamin tablet 1,000 mcg  1,000 mcg Oral DAILY    dilTIAZem (CARDIZEM) IR tablet 30 mg  30 mg Oral AC&HS    DULoxetine (CYMBALTA) capsule 60 mg  60 mg Oral DAILY    furosemide (LASIX) tablet 20 mg  20 mg Oral DAILY    gabapentin (NEURONTIN) capsule 300 mg  300 mg Oral Q8H    lactobacillus sp. 50 billion cpu (BIO-K PLUS) capsule 1 Cap  1 Cap Oral DAILY    metoprolol tartrate (LOPRESSOR) tablet 12.5 mg  12.5 mg Oral Q12H    polyethylene glycol (MIRALAX) packet 17 g  17 g Oral DAILY    potassium chloride (K-DUR, KLOR-CON) SR tablet 20 mEq  20 mEq Oral BID    zinc sulfate (ZINCATE) capsule 220 mg  1 Cap Oral DAILY    atorvastatin (LIPITOR) tablet 40 mg  40 mg Oral QHS       Allergies: Allergies   Allergen Reactions    Morphine Other (comments)     Patient gets confused with morphine.        Functional Progress:    PHYSICAL THERAPY    ON ADMISSION MOST RECENT   Wheelchair Mobility/Management  Able to Propel (ft): 232 feet  Functional Level: 5  Curbs/Ramps Assist Required (FIM Score): 0 (Not tested)  Wheelchair Setup Assist Required : 5 (Supervision/setup)  Wheelchair Management: Manages left brake, Manages right brake Wheelchair Mobility/Management  Able to Propel (ft): 232 feet  Functional Level: 5  Curbs/Ramps Assist Required (FIM Score): 0 (Not tested)  Wheelchair Setup Assist Required : 4 (Minimal assistance)  Wheelchair Management: Manages left brake, Manages right brake     Gait  Amount of Assistance: 4 (Minimal assistance)  Distance (ft): 12 Feet (ft) (x 2 repetitions)  Assistive Device: Walker, rolling Gait  Amount of Assistance: 4 (Minimal assistance)  Distance (ft): 50 Feet (ft)  Assistive Device: Walker, rolling     Balance-Sitting/Standing  Sitting - Static: Good (unsupported)  Sitting - Dynamic: Good (unsupported)  Standing - Static: Fair  Standing - Dynamic : Impaired Balance-Sitting/Standing  Sitting - Static: Good (unsupported)  Sitting - Dynamic: Good (unsupported)  Standing - Static: Fair  Standing - Dynamic : Impaired     Bed/Mat Mobility  Rolling Right : 5 (Supervision)  Rolling Left : 5 (Supervision)  Supine to Sit : 5 (Supervision)  Sit to Supine : 5 (Supervision) Bed/Mat Mobility  Rolling Right : 5 (Supervision)  Rolling Left : 5 (Supervision)  Supine to Sit : 5 (Supervision)  Sit to Supine : 5 (Supervision)     Transfers  Transfer Type: Other  Other: stand step with RW  Transfer Assistance : 3 (Moderate assistance ) (min-mod assist secondary to L LE pain)  Sit to Stand Assistance: Moderate assistance (min-mod assist secondary to L LE pain)  Car Transfers: Not tested  Car Type: N/A Transfers  Transfer Type: Other  Other: stand step with RW  Transfer Assistance : 4 (Minimal assistance)  Sit to Stand Assistance: Minimal assistance  Car Transfers: Not tested  Car Type: N/A     Steps or Stairs  Steps/Stairs Ambulated (#): 0  Level of Assist : 0 (Not tested)  Rail Use: None Steps or Stairs  Steps/Stairs Ambulated (#):  (Unable to attempt secondary to increased L LUE pain, 10/10.)  Level of Assist : 4 (Contact guard assistance)  Rail Use: Both         Lab/Data Review:  Recent Results (from the past 24 hour(s))   PROTHROMBIN TIME + INR    Collection Time: 08/16/17  6:20 AM   Result Value Ref Range    Prothrombin time 18.8 (H) 11.5 - 15.2 sec    INR 1.6 (H) 0.8 - 1.2         Estimated Glomerular Filtration Rate:  On admission, estimated GFR based on a Creatinine of 0.65 mg/dl:   Using CKD-EPI = 106.5 mL/min/1.73m2   Using MDRD = 133.6 mL/min/1.73m2  Most recent estimated GFR, based on a Creatinine of 0.76 mg/dl on 8/14/2017:   Using CKD-EPI = 99.9 mL/min/1.73m2   Using MDRD = 111.6 mL/min/1.73m2       Assessment:     Primary Rehabilitation Diagnosis  1. Impaired Mobility and ADLs  2. S/P Removal of infected graft; Repair of left superficial femoral artery; Repair of left common femoral artery (7/25/2017 - Dr. Starlett Lefort);  History of sepsis associated with infection of vascular bypass graft     Comorbidities        Benign hypertensive heart disease with systolic congestive heart failure, NYHA class 2  I11.0, I50.20    DDD (degenerative disc disease), lumbar M51.36    Erectile dysfunction N52.9    Spinal stenosis of lumbar region with radiculopathy M48.06, M54.16    Hereditary peripheral neuropathy G60.9    Aortoiliac occlusive disease  I74.09    Atherosclerosis of native artery of both lower extremities with intermittent claudication  I70.213    Peripheral artery disease  I73.9    Coronary artery disease involving native coronary artery of native heart I25.10    Chronic atrial fibrillation  I48.2    Acute blood loss as cause of postoperative anemia D62    Impaired mobility and ADLs Z74.09    Anticoagulated on Coumadin Z51.81, Z79.01    Ischemic cardiomyopathy I25.5    Chronic systolic heart failure  B58.93    Carotid artery disease  I77.9    Hyperammonemia  E72.20    Dyslipidemia E78.5    Euthyroid sick syndrome E07.81    Aftercare following surgery of the circulatory system Z48.812    Status post femoral-popliteal bypass surgery Z95.828    History of cardioversion Z98.890    Critical ischemia of lower extremity I99.8    Chronic ulcer of right foot  L97.519    History of vitamin D deficiency Z86.39    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    Chronic anemia D64.9    Chronic ulcer of right heel  L97.419    Ischemic rest pain of lower extremity I73.9        Plan:     1. Justification for continued stay: Good progression towards established rehabilitation goals. 2. Medical Issues being followed closely:    [x]  Fall and safety precautions     [x]  Wound Care     [x]  Bowel and Bladder Function     [x]  Fluid Electrolyte and Nutrition Balance     [x]  Pain Control      3. Issues that 24 hour rehabilitation nursing is following:    [x]  Fall and safety precautions     [x]  Wound Care     [x]  Bowel and Bladder Function     [x]  Fluid Electrolyte and Nutrition Balance     [x]  Pain Control      [x]  Assistance with and education on in-room safety with transfers to and from the bed, wheelchair, toilet and shower.       4. Acute rehabilitation plan of care:    [x]  Continue current care and rehab.           [x]  Physical Therapy           [x]  Occupational Therapy           []  Speech Therapy     []  Hold Rehab until further notice     5. Medications:    [x]  MAR Reviewed     [x]  Continue Present Medications     6. DVT Prophylaxis:      []  Lovenox     []  Unfractionated Heparin     [x]  Coumadin     []  NOAC     []  VASQUEZ Stockings     []  Sequential Compression Device     []  None     7. Orders:   > S/P Removal of infected graft; Repair of left superficial femoral artery; Repair of left common femoral artery (7/25/2017 - Dr. Fanta High);  History of sepsis associated with infection of vascular bypass graft   > (+) numbness from the middle of the left lower leg downwards   > On 8/10/2017, Vascular Surgery ([address] [place] [address]) was informed of the numbness and erythema of the shin of the left leg and she was aware of it; no diagnostic studies for now; Vascular Surgery will be evaluating the patient tomorrow to see if he needs further surgical intervention   > On 8/12/2017, patient complained of worsening pain of LLE at rest   > WBC count (8/13/2017) = 12.4   > On 8/14/2017, patient was seen and evaluated by [address] [place] [address] JUNE WILSON and discussed with Dr. Fanta High and it was felt that the patient has Ischemic rest pain of the left lower extremity and the patient will be scheduled for removal of infected right bypass graft with jump bypass and left AKA on Friday (8/18/2017)   > WBC count (8/15/2017) = 11.9   > Continue Cefazolin 2 grams IVPB q 8 hr until 9/13/2017    > Acute Postoperative Blood Loss Anemia   > Hgb/Hct (8/8/2017, on admission) = 8.7/27.0   > Anemia work-up showed serum iron 28, TIBC 211, iron % saturation 13, ferritin 1105, reticulocyte count 8.8   > On 8/8/2017:    > Discontinued FeSO4 325 mg PO once daily with breakfast     > Patient was given Epoetin constance 10,000 units SC x 1 dose   > Hgb/Hct (8/10/2017) = 8.7/26.6    > On 8/10/2017, patient was given Epoetin constance 10,000 units SC x 1 dose   > Hgb/Hct (8/13/2017) = 9.3/29.1    > Hgb/Hct (8/14/2017) = 10.1/30.4    > Hgb/Hct (8/15/2017) = 9.9/29.8    > Benign hypertensive heart disease with chronic systolic heart failure   > On 8/8/2017, increased Losartan from 25 mg to 50 mg PO once daily (6AM)   > Continue:    > Diltiazem IR 30 mg PO TID AC and q HS    > Furosemide 20 mg PO once daily (6AM)    > Losartan 50 mg PO once daily (6AM)    > Metoprolol tartrate 12.5 mg PO q 12 hr (9AM, 9PM)    > Paroxysmal atrial fibrillation, presently normal sinus rhythm, anticoagulated on Coumadin   > On 8/8/2017, discontinued Enoxaparin 40 mg SC q 24 hr   > Continue:    > Amiodarone 200 mg PO once daily    > Metoprolol tartrate 12.5 mg PO q 12 hr (9AM, 9PM)   > On 8/15/2017, patient was given Phytonadione 2.5 mg PO x 1 dose    > Patient received no Coumadin last night (re: discontinued since 8/14/2017 as per Vascular Surgery as they are planning on surgical intervention on Friday)   > INR 1.6    > Chronic ulcer of right heel    > Podiatry consult (Dr. Brea Vo) called for recommendations on care   > Continue:    > Rigid heel suspension boots on both feet when supine in bed    > Collagenase ointment applied to right heel ulcer once daily, then cover with dry sterile dressing    > Analgesia   > Continue:     > Norco 10/325 1 tab PO q 4 hr PRN for pain level greater than 4/10    > Increase Hydromorphone from 1 mg IV 2 times daily (6PM, 10PM) to 1 mg IV 3 times daily (6PM, 10PM, 2AM)    > Acetaminophen 650 mg PO q 4 hr PRN for pain level less than 5/10    > Na (8/8/2017, on admission) = 129   > On 8/8/2017, patient was started on NaCl 1 gram PO BID   > Na (8/12/2017) = 132   > Na (8/13/2017) = 130   > Na (8/14/2017) = 131    > Mg (8/8/2017, on admission) = 1.6   > On 8/8/2017, patient was started on Mg(OH)2 400 mg PO once daily   > Mg (8/14/2017) = 1.8      8. Patient's progress in rehabilitation and medical issues discussed with the patient.  All questions answered to the best of my ability. Care plan discussed with patient and nurse.     9. Discharge Planning:   > For discharge / transfer to [facility] as an inpatient tomorrow (8/17/2017)   > Patient will be put on the schedule for removal of infected right bypass graft with jump bypass and left AKA on Friday (8/18/2017)      Signed:    Nohemi Ndiaye MD    August 16, 2017

## 2017-08-16 NOTE — PROGRESS NOTES
[x] Psychology  [] Social Work [] Recreational Therapy    INTERVENTION  UNITS/TIME OF SERVICE   Assessment    Supportive Counseling August 15, 2017   Orientation    Discharge Planning    Resource Linkage              Progress/Current Status    Individual support  with patient this morning on ARU; he is found lying quietly in bed and appearing pensive and preoccupied. He is fully aware of the immediate medical complications for him and makes several references to the probability that he will have amputation. He indicated that his daughter will be in shortly but that she may already be aware. Patient is identifying much discomfort and complains of acute pain in the LE; he almost seems to be relieved that the pain may well be eliminated. Patient acknowledged that it has been difficult to manage in therapy because of the pain. As of now, he seems to be waiting for decisions to be made and he is aware of transfer to medical, probably on Thursday. Patient encouraged to express any emergent upset thoughts and feelings, as he is able. Actually, he is found to use humor as a way of coping, rather than actually express feelings of depression nor regret.     Sugey Smith, THE [facility] 8/16/2017 9:23 AM

## 2017-08-16 NOTE — ROUTINE PROCESS
SHIFT CHANGE NOTE FOR [facility]    Bedside and Verbal shift change report given to Oren Collado RN (oncoming nurse) by Kasandra Christy RN   (offgoing nurse). Report included the following information SBAR, Kardex, MAR and Recent Results. Situation:   Code Status: Full Code   Reason for Admission: sepsis  Hospital Day: 9   Problem List:   Hospital Problems  Date Reviewed: 8/15/2017          Codes Class Noted POA    Ischemic rest pain of lower extremity ([facility] ICD-10-CM: I73.9  ICD-9-CM: 443.9  8/14/2017 Yes    Overview Signed 8/15/2017 11:09 AM by Rajani Sloan MD     Left             Chronic ulcer of right heel ([facility] (Chronic) ICD-10-CM: L97.419  ICD-9-CM: 707.14  8/8/2017 Yes        Acute blood loss as cause of postoperative anemia ICD-10-CM: D62  ICD-9-CM: 285.1  7/25/2017 Yes        Aftercare following surgery of the circulatory system ICD-10-CM: Z48.812  ICD-9-CM: V58.73  7/25/2017 Yes    Overview Addendum 8/7/2017  2:20 PM by Rajani Sloan MD     S/P Removal of infected graft; Repair of left superficial femoral artery; Repair of left common femoral artery (7/25/2017 - Dr. Anu Mosley)             Impaired mobility and ADLs ICD-10-CM: Z74.09  ICD-9-CM: 799.89  7/24/2017 Yes        Infection of vascular bypass graft ([facility] ICD-10-CM: T82. 7XXA  ICD-9-CM: 996.62  7/24/2017 Yes    Overview Signed 8/7/2017  2:19 PM by Rajani Sloan MD     Left lower extremity             * (Principal)Sepsis associated with internal vascular access [facility]) ICD-10-CM: T82. 7XXA, A41.9  ICD-9-CM: 996.62, 038.9  7/24/2017 Yes        Anticoagulated on Coumadin (Chronic) ICD-10-CM: Z51.81, Z79.01  ICD-9-CM: V58.83, V58.61  Unknown Yes        Chronic atrial fibrillation (HCC) (Chronic) ICD-10-CM: I48.2  ICD-9-CM: 427.31  Unknown Yes        Benign hypertensive heart disease with systolic congestive heart failure, NYHA class 2 (HCC) (Chronic) ICD-10-CM: I11.0, I50.20  ICD-9-CM: 402.11, 428.20, 428.0  1/26/2015 Yes Background:   Past Medical History:   Past Medical History:   Diagnosis Date    Acute blood loss as cause of postoperative anemia 4/4/2017    Anticoagulated on Coumadin     Aortoiliac occlusive disease (HCC) 1/25/2017    Atherosclerosis of native artery of both lower extremities with intermittent claudication ([facility] 1/25/2017    Benign hypertensive heart disease with systolic congestive heart failure, NYHA class 2 ([facility] 1/26/2015    Carotid artery disease (HCC)     Chronic anemia     Chronic atrial fibrillation (HCC)     Chronic systolic heart failure (HCC)     Chronic ulcer of right foot ([address] 4/4/2017    Chronic ulcer of right heel ([address] 8/8/2017    Coronary artery disease involving native coronary artery of native heart 3/15/2017    Successful stenting of Cx (Xience LESLEY) and RCA (Xience LESLEY) to 0% by Dr. Nirali Eckert on 3/15/17.  DDD (degenerative disc disease), lumbar 1/26/2015    Dyslipidemia     Erectile dysfunction 7/5/2016    Euthyroid sick syndrome 4/6/2017    Hereditary peripheral neuropathy 11/15/2016    History of cardioversion 4/18/2017    S/P Synchronized external cardioversion (4/18/2017 - Dr. Александр Moreno)    History of vitamin D deficiency 4/22/2017    Vitamin D 25-Hydroxy (4/22/2017) = 12.0; Vitamin D 25-Hydroxy (5/26/2017) = 38.7    Infection of vascular bypass graft ([facility] 7/24/2017    Left lower extremity    Ischemic cardiomyopathy     Peripheral artery disease ([facility] 1/25/2017    Spinal stenosis of lumbar region with radiculopathy 5/4/2015    Dr. Lidia Batista      Patient taking anticoagulants no    Patient has a defibrillator: no     Assessment:   Changes in Assessment throughout shift: no     Patient has central line: no Reasons if yes: Insertion date: Last dressing date:   Patient has Jiang Cath: no Reasons if yes:     Insertion date:     Last Vitals:     Vitals:    08/15/17 1600 08/15/17 2000 08/15/17 2227 08/16/17 0504   BP: 111/61 122/78 122/78 122/62 Pulse: 60 88 89 72   Resp: 18 18     Temp: 96.8 °F (36 °C) 98.8 °F (37.1 °C)     SpO2: 96% 96%     Weight:            PAIN    Pain Assessment    Pain Intensity 1: 0 (08/16/17 0502) Pain Intensity 1: 2 (12/29/14 1105)    Pain Location 1: Foot, Leg Pain Location 1: Abdomen    Pain Intervention(s) 1: Medication (see MAR) Pain Intervention(s) 1: Medication (see MAR)  Patient Stated Pain Goal: 0 Patient Stated Pain Goal: 0  o Intervention effective: no    o Other actions taken for pain: pain medicine     Skin Assessment  Skin color Skin Color: Appropriate for ethnicity  Condition/Temperature Skin Condition/Temp: Dry  Integrity Skin Integrity: Tear, Wound (add Wound LDA)  Turgor Turgor: Non-tenting  Weekly Pressure Ulcer Documentation  Pressure  Injury Documentation: No Pressure Injury Noted-Pressure Ulcer Prevention Initiated  Wound Prevention & Protection Methods  Orientation of wound Orientation of Wound Prevention: Posterior  Location of Prevention Location of Wound Prevention: Sacrum/Coccyx  Dressing Present Dressing Present : No  Dressing Status Dressing Status: Intact  Wound Offloading Wound Offloading (Prevention Methods): Bed, pressure redistribution/air, Chair cushion, Pillows, Specialty boot/shoe, Wheelchair     INTAKE/OUPUT    Date 08/15/17 0700 - 08/16/17 0659 08/16/17 0700 - 08/17/17 0659   Shift 3718-2543 8577-4160 24 Hour Total 9647-4589 7380-1815 24 Hour Total   I  N  T  A  K  E   P. O. 720  720         P. O. 720  720       Shift Total  (mL/kg) 720  (9.6)  720  (9.6)      O  U  T  P  U  T   Urine  (mL/kg/hr)  2800 2800         Urine Voided  2800 2800         Urine Occurrence(s) 4 x 0 x 4 x       Emesis/NG output  0 0         Emesis  0 0         Emesis Occurrence(s)  0 x 0 x       Stool  0 0         Stool Occurrence(s) 1 x 0 x 1 x         Stool  0 0       Shift Total  (mL/kg)  2800  (37.3) 2800  (37.3)       -2800 -2080      Weight (kg) 75 75 75 75 75 75       Recommendations:  1.  Patient needs and requests: pain medicine    2. Diet: cardiac    3. Pending tests/procedures: no     4. Functional Level/Equipment: 1 x person assist    5. Estimated Discharge Date: TDB Posted on Whiteboard in Patients Room: no     [facility] Safety Check    A safety check occurred in the patient's room between off going nurse and oncoming nurse listed above. The safety check included the below items  Area Items   H  High Alert Medications - Verify all high alert medication drips (heparin, PCA, etc.)   E  Equipment - Suction is set up for ALL patients (with cailin)  - Red plugs utilized for all equipment (IV pumps, etc.)  - WOWs wiped down at end of shift.  - Room stocked with oxygen, suction, and other unit-specific supplies   A  Alarms - Bed alarm is set for fall risk patients  - Ensure chair alarm is in place and activated if patient is up in a chair   L  Lines - Check IV for any infiltration  - Jiang bag is empty if patient has a Jiang   - Tubing and IV bags are labeled   S  Safety   - Room is clean, patient is clean, and equipment is clean. - Hallways are clear from equipment besides carts. - Fall bracelet on for fall risk patients  - Ensure room is clear and free of clutter  - Suction is set up for ALL patients (with cailin)  - Hallways are clear from equipment besides carts.    - Isolation precautions followed, supplies available outside room, sign posted

## 2017-08-16 NOTE — WOUND CARE
Physical Exam   Room 178: wound assessment  Wound Groin Left (Active) open surgical wound    DRESSING STATUS Intact 8/16/2017  9:45 AM   DRESSING TYPE Negative pressure wound therapy 8/16/2017  9:45 AM   Incision site well approximated? No 8/16/2017  9:45 AM   Non-Pressure Injury Full thickness (subcut/muscle) 8/16/2017  9:45 AM   Wound Length (cm) 4.3 cm 8/16/2017  9:45 AM   Wound Width (cm) 1 cm 8/16/2017  9:45 AM   Wound Depth (cm) 1 8/16/2017  9:45 AM   Wound Surface area (cm^3) 4.3 cm^2 8/16/2017  9:45 AM   Change in Wound Size % 84.6 8/16/2017  9:45 AM   Condition of Base [place];Slough 8/16/2017  9:45 AM   Condition of Edges Closed 8/16/2017  9:45 AM   Epithelialization (%) 0 8/16/2017  9:45 AM   Tissue Type Pink;Yellow 8/16/2017  9:45 AM   Tissue Type Percent Pink 75 8/16/2017  9:45 AM   Tissue Type Percent Yellow 25 8/16/2017  9:45 AM   Drainage Amount  Small  8/16/2017  9:45 AM   Drainage Color Serosanguinous; Tan 8/16/2017  9:45 AM   Wound Odor None 8/16/2017  9:45 AM   Periwound Skin Condition Intact 8/16/2017  9:45 AM   Dressing Type Applied Negative pressure wound therapy 8/16/2017  9:45 AM   Procedure Tolerated Well 8/16/2017  9:45 AM   Number of days:9       Wound Thigh Left (Active) open surgical wound   DRESSING STATUS Intact 8/16/2017  9:45 AM   DRESSING TYPE Negative pressure wound therapy 8/16/2017  9:45 AM   Incision site well approximated?  No 8/16/2017  9:45 AM   Non-Pressure Injury Full thickness (subcut/muscle) 8/16/2017  9:45 AM   Wound Length (cm) 5.5 cm 8/16/2017  9:45 AM   Wound Width (cm) 1 cm 8/16/2017  9:45 AM   Wound Depth (cm) 1.2 8/16/2017  9:45 AM   Wound Surface area (cm^3) 6.6 cm^2 8/16/2017  9:45 AM   Change in Wound Size % 56.35 8/16/2017  9:45 AM   Condition of Base Granulation;Slough 8/16/2017  9:45 AM   Condition of Edges Closed 8/16/2017  9:45 AM   Epithelialization (%) 0 8/16/2017  9:45 AM   Tissue Type Red;Yellow 8/16/2017  9:45 AM   Tissue Type Percent Red 90 8/16/2017 9:45 AM   Tissue Type Percent Yellow 10 8/16/2017  9:45 AM   Drainage Amount  Small  8/16/2017  9:45 AM   Drainage Color Serosanguinous; Tan 8/16/2017  9:45 AM   Wound Odor None 8/16/2017  9:45 AM   Periwound Skin Condition Intact 8/16/2017  9:45 AM   Dressing Type Applied Negative pressure wound therapy 8/16/2017  9:45 AM   Procedure Tolerated Well 8/16/2017  9:45 AM   Number of days:9   Pt agreeable to continue current wound vac treatment. Wound care education provided to pt at this time. Will turn over care to nursing staff at this time.   Ara ROWANN, RN, [facility], [address]

## 2017-08-16 NOTE — PROGRESS NOTES
Problem: Mobility Impaired (Adult and Pediatric)  Goal: *Acute Goals and Plan of Care (Insert Text)  Physical Therapy Goals  Short Term Goals  Initiated 2017, re-assessed 8/15/2017, and to be accomplished within 5-7 day(s) (8/15/2017)  To be determined pending medical follow-up/intervention. Long Term Goals  Initiated 2017, re-assessed 8/15/2017, and to be accomplished within 10-14 day(s) (2017)  To be determined pending medical follow-up/intervention.    PHYSICAL THERAPY DISCHARGE SUMMARY  Patient Name:Mj Nicole Risecharlene   Precautions at discharge:       Pain at start of tx: please refer to daily note  Pain at stop of tx: please refer to daily note     Patient identified with name and : yes     Problem List:    Decreased strength B LE  [X]     Decreased strength trunk/core  [X]     Decreased AROM   [X]     Decreased PROM  [X]     Decreased balance standing  [X]     Decreased endurance  [X]     Pain  [X]        Functional Limitations:   Decreased independence with bed mobility  [X]     Decreased independence with functional transfers  [X]     Decreased independence with ambulation  [X]     Decreased independence with stair negotiation  [X]                Outcome Measures: FIM                 MMT Initial Asssessment    Right Lower Extremity Left Lower Extremity   Hip Flexion 4 4   Knee Extension 4+ 4   Knee Flexion 4 3+ (2/2 calf pain with testing)   Ankle Dorsiflexion 4+ 4                  MMT Discharge Assessment: NT secondary to pain    Right Lower Extremity Left Lower Extremity   Hip Flexion       Knee Extension       Knee Flexion       Ankle Dorsiflexion       0/5       No palpable muscle contraction  1/5       Palpable muscle contraction, no joint movement  2-/5      Less than full range of motion in gravity eliminated position  2/5       Able to complete full range of motion in gravity eliminated position  2+/5     Able to initiate movement against gravity  3-/5      More than half but not full range of motion against gravity  3/5       Able to complete full range of motion against gravity  3+/5     Completes full range of motion against gravity with minimal resistance  4-/5      Completes full range of motion against gravity with minimal-moderate resistance  4/5       Completes full range of motion against gravity with moderate resistance  4+/5     Completes full range of motion against gravity with moderate-maximum resistance  5/5       Completes full range of motion against gravity with maximum resistance                 AROM:  Limited B ankle DF      FIM SCORES Initial Assessment Discharge Assessment   Bed/Chair/Wheelchair Transfers 3 4   Wheelchair Mobility 5 6   Walking [place] 1 1 (Not tolerating at this time)   Steps/Stairs 0 1 (Not tolerating at this time)   PRIMARY MODE OF LOCOMOTION: w/c propulsion secondary to poor weight bearing tolerance L LE  Please see IRC Interdisciplinary Eval: Coordination/Balance Section for details regarding FIM score description.       BED/CHAIR/WHEELCHAIR TRANSFERS Initial Assessment Discharge Assessment   Rolling Right 5 (Supervision) 5 (Supervision) (for safety with wound vac management)   Rolling Left 5 (Supervision) 5 (Supervision)(for safety with wound vac management)   Supine to Sit 5 (Supervision) 5 (Supervision)(for safety with wound vac management)   Sit to Stand Moderate assistance (min-mod assist secondary to L LE pain) Min assist for balance and safety   Sit to Supine 5 (Supervision) 5 (Supervision)(for safety with wound vac management)   Transfer Assist Score 3 4   Transfer Type Other Stand Step with RW with minimal assistance   Comments   Mobility not challenged this session secondary to poor tolerance and pt declining participation in mobility   Car Transfer Not tested NT   Car Type N/A N/A          WHEELCHAIR MOBILITY/MANAGEMENT Initial Assessment Discharge Assessment   Able to Propel 232 feet 232 feet with modified independence   Functional Level 5 6   Curbs/ramps assistance required 0 (Not tested) NT   Wheelchair set up assistance required 5 (Supervision/setup) Supervision   Wheelchair management Manages left brake, Manages right brake Manages B brakes          WALKING INDEPENDENCE Initial Assessment Discharge Assessment   Assistive device Walker, rolling     Ambulation assistance - level surface       Distance 12 Feet (ft) (x 2 repetitions)     Functional Level 1 1 (Not tested 2/2 pt not tolerating)   Comments       Ambulation assistance - unlevel surface              STEPS/STAIRS Initial Assessment Discharge Assessment   Steps/Stairs ambulated 0     Rail Use None     Functional Level 0 1 (NT secondary to pt not tolerating)   Comments       Curbs/Ramps          Pt not able to participate in skilled mobility assessment this session - please refer to daily note for details. PHYSICAL THERAPY PLAN OF CARE     LTGs: Pt did not achieve goals set upon initial evaluation secondary to worsening L LE pain and need for d/c to acute hospital for medical intervention to address. Pt to be discharged to hospital per vascular recommendation. Further therapy recommendations to be made following medical intervention and re-consult for new PT evaluation after intervention. Please see IRC; Interdisciplinary Eval, Care Plan, and Patient Education for further information regarding physical therapy discharge summary and plan of care.       Casandra Bruno, PT, DPT  8/16/2017

## 2017-08-16 NOTE — TELEPHONE ENCOUNTER
Lynnette gannon, she was wondering if anyone had spoken with her dad about the amputation, she said it is her understanding he is going today. Please call her.

## 2017-08-16 NOTE — TELEPHONE ENCOUNTER
Called the daughter back and patient is scheduled for Friday and advised the daughter of the time. She verbalized understanding.

## 2017-08-16 NOTE — PROGRESS NOTES
Problem: Self Care Deficits Care Plan (Adult)  Goal: *Acute Goals and Plan of Care (Insert Text)  Long Term Goals (to be met upon discharge date) in order to increase pts functional independence and safety, and decrease burden of care:  1. Pt will perform grooming with independence. 2. Pt will perform UB bathing with modified independence. 3. Pt will perform LB bathing with modified independence. 4. Pt will perform tub/shower transfer with modified independence. 5. Pt will perform UB dressing with independence. 6. Pt will perform LB dressing with modified independence. 7. Pt will perform toileting task with modified independence. 8. Pt will perform toilet transfer with modified independence. Short Term Weekly Goals for (2017 to 8/15/2017) in order to increase pts functional independence and safety, and decrease burden of care:   1. Pt will perform grooming with independence. 2. Pt will perform UB bathing with modified independence. 3. Pt will perform LB bathing with supervision. 4. Pt will perform tub/shower transfer with Min A.  5. Pt will perform UB dressing with independence. 6. Pt will perform LB dressing with Min A.  7. Pt will perform toileting task with Min A.  8. Pt will perform toilet transfer with Min A.   OCCUPATIONAL THERAPY DISCHARGE SUMMARY  Patient Name:Mj Caro  Time Spent With Patient  Time In: 0930  Time Out: 1030  Time In: 300  Time Out: 330     Pain at start of tx:9/10 B's legs  Pain at stop of tx:9/10 B's legs     Patient identified with name and :yes  Objective: Pt sen in bed upon arrival in room for AM shower with daughter present. Pt didn't care about the shower 2/2 the pain. Pt 's daughter encourages pt to take a shower. See below for ADL's functional level. Second session: UE strengthening EOB 2/2 difficulty with t/f 2/2 pain in LE's. Pt reports 8/10 pain L shin and other parts of LE. Pt completed 2x12 reps in all plane with rest between sets.       Problem List:    Decreased strength B UE  [ ]     Decreased strength trunk/core  [ ]     Decreased AROM   [ ]     Decreased PROM  [ ]     Decreased balance sitting  [ ]     Decreased balance standing  [X]     Decreased endurance  [X]     Pain  [X]        Functional Limitations:   Decreased independence with ADL  [X]     Decreased independence with functional transfers  [X]     Decreased independence with ambulation  [X]     Decreased independence with IADL  [X]        Outcome Measures:                  MMT Initial Assessment    Right Upper Extremity  Left Upper Extermity    UE AROM WFL; Grossly 4/5  WFL; Grossly 4/5    Shoulder flexion       Shoulder extension       Shoulder ABDuction       Shoulder ADDUction       Elbow Flexion       Elbow Extension       Wrist Extension/Flexion                                     MMT Discharge Assessment    Right Upper Extremity  Left Upper Extermity    UE AROM WFL; Grossly 4/5 WFL; Grossly 4/5   Shoulder flexion - -   Shoulder extension - -   Shoulder ABDuction - -   Shoulder ADDUction - -   Elbow Flexion - -   Elbow Extension - -   Wrist Extension/Flexion - -    - -         0/5       No palpable muscle contraction  1/5       Palpable muscle contraction, no joint movement  2-/5      Less than full range of motion in gravity eliminated position  2/5       Able to complete full range of motion in gravity eliminated position  2+/5     Able to initiate movement against gravity  3-/5      More than half but not full range of motion against gravity  3/5       Able to complete full range of motion against gravity  3+/5     Completes full range of motion against gravity with minimal resistance  4-/5      Completes full range of motion against gravity with minimal-moderate resistance  4/5       Completes full range of motion against gravity with moderate resistance  4+/5     Completes full range of motion against gravity with moderate-maximum resistance  5/5       Completes full range of motion against gravity with maximum resistance     Coordination: B's UE Intact  Sensation: * B's UE Intact      FIM SCORES Initial Assessment Discharge Assessment   Eating 5 Feeding/Eating  Feeding/Eating Assistance: 6 (Modified independent)  Comments: Pt wear dentures   Grooming 5 Grooming  Grooming Assistance : 7 (Independent)  Comments: seated at sink  Oral Hygiene FIM: 7- Independent     Bathing 4 Upper Body Bathing  Bathing Assistance, Upper: 6 (Modified independent)  Position Performed: Seated in chair. Lower Body Bathing  Bathing Assistance, Lower : 6 (Modified independent)  Position Performed: Seated in chair  Comments: Pt weight shift on shower bench washing buttocks. Bringing LE toward chest washing feet. Upper Body Dressing 5 Upper Body Dressing   Dressing Assistance : 6 (Modified independent)   Lower Body Dressing 3 Lower Body Dressing   Dressing Assistance : 4 (Minimal assistance)  Leg Crossed Method Used: No  Position Performed: Bending forward method;Seated in chair  Comments: pt jasmeet  pants to thigh then weight bear on UE's on wheel chair arms to have pants jasmeet over buttocks 2/2 pt inability to stand on LE 2/2 pain. Sock and/or Shoe Management FIM: 7-independent   Toileting 2 Toileting  Toileting Assistance (FIM Score): 4 (Minimal with CM  assistance)  Adaptive Equipment: Other (comment) (3:1commode over raise toilet)   Tub/Shower Transfer 3 Tub or Shower Type: Shower  Amount of Assistance Required: 4 (Minimal assistance) Pt squat  pivot to and from wheel chair 2/2 pain in LE's with standing. Adaptive Equipment: Grab bars; Shower chair with back   Toilet Transfer 3 Functional Transfers  Toilet Transfer : Lateral squat pivot transfer without device  Amount of Assistance Required: 4 (Minimal assistance) 2/2 pain in LE's with standing      Comprehension 6 6   Expression 6 6   Social Interaction 5 6   Problem Solving 4 5   Memory 5 5   Please see IRC Interdisciplinary Eval: Coordination/Balance Section for details regarding FIM score description. OCCUPATIONAL THERAPY PLAN OF CARE     LTGs: Pt made progress during his rehab stay meeting 4/9 GTG's (1-3,5) 2/2 medical setback   Pt would benefit from continued skilled occupational therapy in order to improve self care and functional mobility within the home with use of least restrictive device. Interventions may include ADL training  (B UE strengthening/coordination), activity tolerance (vitals, oxygen saturation levels),and functional transfer training. Pt to be discharged on 8/17/17 going SNF. Therapy recommendations:  Skill OT to maximize functional activities wirh ADL     Please see IRC; Interdisciplinary Eval, Care Plan, and Patient Education for further information regarding occupational therapy discharge summary and plan of care.       Evan TATE  8/16/2017

## 2017-08-16 NOTE — ROUTINE PROCESS
SHIFT CHANGE NOTE FOR [place]    Bedside and Verbal shift change report given to Stephany Cabot, RN (oncoming nurse) by Erna Mtz RN   (offgoing nurse). Report included the following information SBAR, Kardex, MAR and Recent Results. Situation:   Code Status: Full Code   Reason for Admission: sepsis  Hospital Day: 9   Problem List:   Hospital Problems  Date Reviewed: 8/16/2017          Codes Class Noted POA    Ischemic rest pain of lower extremity ([facility] ICD-10-CM: I73.9  ICD-9-CM: 443.9  8/14/2017 Yes    Overview Signed 8/15/2017 11:09 AM by Jayne Lennon MD     Left             Chronic ulcer of right heel ([facility] (Chronic) ICD-10-CM: L97.419  ICD-9-CM: 707.14  8/8/2017 Yes        Acute blood loss as cause of postoperative anemia ICD-10-CM: D62  ICD-9-CM: 285.1  7/25/2017 Yes        Aftercare following surgery of the circulatory system ICD-10-CM: Z48.812  ICD-9-CM: V58.73  7/25/2017 Yes    Overview Addendum 8/7/2017  2:20 PM by Jayne Lennon MD     S/P Removal of infected graft; Repair of left superficial femoral artery; Repair of left common femoral artery (7/25/2017 - Dr. Pop Urbina)             Impaired mobility and ADLs ICD-10-CM: Z74.09  ICD-9-CM: 799.89  7/24/2017 Yes        Infection of vascular bypass graft ([facility] ICD-10-CM: T82. 7XXA  ICD-9-CM: 996.62  7/24/2017 Yes    Overview Signed 8/7/2017  2:19 PM by Jayne Lennon MD     Left lower extremity             * (Principal)Sepsis associated with internal vascular access [facility]) ICD-10-CM: T82. 7XXA, A41.9  ICD-9-CM: 996.62, 038.9  7/24/2017 Yes        Anticoagulated on Coumadin (Chronic) ICD-10-CM: Z51.81, Z79.01  ICD-9-CM: V58.83, V58.61  Unknown Yes        Chronic atrial fibrillation (HCC) (Chronic) ICD-10-CM: I48.2  ICD-9-CM: 427.31  Unknown Yes        Benign hypertensive heart disease with systolic congestive heart failure, NYHA class 2 (HCC) (Chronic) ICD-10-CM: I11.0, I50.20  ICD-9-CM: 402.11, 428.20, 428.0  1/26/2015 Yes Background:   Past Medical History:   Past Medical History:   Diagnosis Date    Acute blood loss as cause of postoperative anemia 4/4/2017    Anticoagulated on Coumadin     Aortoiliac occlusive disease (HCC) 1/25/2017    Atherosclerosis of native artery of both lower extremities with intermittent claudication ([facility] 1/25/2017    Benign hypertensive heart disease with systolic congestive heart failure, NYHA class 2 ([facility] 1/26/2015    Carotid artery disease (HCC)     Chronic anemia     Chronic atrial fibrillation (HCC)     Chronic systolic heart failure (HCC)     Chronic ulcer of right foot ([address] 4/4/2017    Chronic ulcer of right heel ([address] 8/8/2017    Coronary artery disease involving native coronary artery of native heart 3/15/2017    Successful stenting of Cx (Xience LESLEY) and RCA (Xience LESLEY) to 0% by Dr. Jennings Officer on 3/15/17.  DDD (degenerative disc disease), lumbar 1/26/2015    Dyslipidemia     Erectile dysfunction 7/5/2016    Euthyroid sick syndrome 4/6/2017    Hereditary peripheral neuropathy 11/15/2016    History of cardioversion 4/18/2017    S/P Synchronized external cardioversion (4/18/2017 - Dr. Margot Crowe)    History of vitamin D deficiency 4/22/2017    Vitamin D 25-Hydroxy (4/22/2017) = 12.0; Vitamin D 25-Hydroxy (5/26/2017) = 38.7    Infection of vascular bypass graft ([facility] 7/24/2017    Left lower extremity    Ischemic cardiomyopathy     Peripheral artery disease ([facility] 1/25/2017    Spinal stenosis of lumbar region with radiculopathy 5/4/2015    Dr. Jeanmarie Tirado      Patient taking anticoagulants no    Patient has a defibrillator: no     Assessment:   Changes in Assessment throughout shift: no     Patient has central line: no Reasons if yes: Insertion date: Last dressing date:   Patient has Jiang Cath: no Reasons if yes:     Insertion date:     Last Vitals:     Vitals:    08/15/17 2227 08/16/17 0504 08/16/17 0810 08/16/17 1627   BP: 122/78 122/62 138/67 126/63 Pulse: 89 72 69 65   Resp:   17 18   Temp:   97.7 °F (36.5 °C) 96.9 °F (36.1 °C)   SpO2:   100% 97%   Weight:            PAIN    Pain Assessment    Pain Intensity 1: 7 (08/16/17 1600) Pain Intensity 1: 2 (12/29/14 1105)    Pain Location 1: Leg Pain Location 1: Abdomen    Pain Intervention(s) 1: Medication (see MAR) Pain Intervention(s) 1: Medication (see MAR)  Patient Stated Pain Goal: 4 Patient Stated Pain Goal: 0  o Intervention effective: no    o Other actions taken for pain: pain medicine     Skin Assessment  Skin color Skin Color: Appropriate for ethnicity  Condition/Temperature Skin Condition/Temp: Dry  Integrity Skin Integrity: Wound (add Wound LDA)  Turgor Turgor: Non-tenting  Weekly Pressure Ulcer Documentation  Pressure  Injury Documentation: No Pressure Injury Noted-Pressure Ulcer Prevention Initiated  Wound Prevention & Protection Methods  Orientation of wound Orientation of Wound Prevention: Posterior  Location of Prevention Location of Wound Prevention: Sacrum/Coccyx  Dressing Present Dressing Present : No  Dressing Status Dressing Status: Intact  Wound Offloading Wound Offloading (Prevention Methods): Bed, pressure redistribution/air, Chair cushion, Wheelchair     INTAKE/OUPUT    Date 08/15/17 1900 - 08/16/17 0659 08/16/17 0700 - 08/17/17 0659   Shift 8660-6155 24 Hour Total 1295-2139 3659-1470 24 Hour Total   I  N  T  A  K  E   P. O.  720 540  540      P. O.  720 540  540    Other   0  0      Intake (ml) (Vacuum Assisted Closure Left Groin)   0  0    Shift Total  (mL/kg)  720  (9.6) 540  (7.2)  540  (7.2)   O  U  T  P  U  T   Urine  (mL/kg/hr) 2800 2800 200  (0.2)  200      Urine Voided 2800 2800 200  200      Urine Occurrence(s) 0 x 4 x 3 x  3 x    Emesis/NG output 0 0         Emesis 0 0         Emesis Occurrence(s) 0 x 0 x       Drains   0  0      Output (ml) (Vacuum Assisted Closure Left Groin)   0  0    Stool 0 0         Stool Occurrence(s) 0 x 1 x 1 x  1 x      Stool 0 0       Shift Total  (mL/kg) 2800  (37.3) 2800  (37.3) 200  (2.7)  200  (2.7)   NET -2800 -2080 340  340   Weight (kg) 75 75 75 75 75       Recommendations:  1. Patient needs and requests: pain medicine    2. Diet: cardiac    3. Pending tests/procedures: no     4. Functional Level/Equipment: 1 x person assist    5. Estimated Discharge Date: 08/17/17 Posted on Whiteboard in Patients Room: yes     HEALS Safety Check    A safety check occurred in the patient's room between off going nurse and oncoming nurse listed above. The safety check included the below items  Area Items   H  High Alert Medications - Verify all high alert medication drips (heparin, PCA, etc.)   E  Equipment - Suction is set up for ALL patients (with cailin)  - Red plugs utilized for all equipment (IV pumps, etc.)  - WOWs wiped down at end of shift.  - Room stocked with oxygen, suction, and other unit-specific supplies   A  Alarms - Bed alarm is set for fall risk patients  - Ensure chair alarm is in place and activated if patient is up in a chair   L  Lines - Check IV for any infiltration  - Jiang bag is empty if patient has a Jiang   - Tubing and IV bags are labeled   S  Safety   - Room is clean, patient is clean, and equipment is clean. - Hallways are clear from equipment besides carts. - Fall bracelet on for fall risk patients  - Ensure room is clear and free of clutter  - Suction is set up for ALL patients (with cailin)  - Hallways are clear from equipment besides carts.    - Isolation precautions followed, supplies available outside room, sign posted

## 2017-08-16 NOTE — PROGRESS NOTES
Problem: Mobility Impaired (Adult and Pediatric)  Goal: *Acute Goals and Plan of Care (Insert Text)  Physical Therapy Goals  Short Term Goals  Initiated 2017, re-assessed 8/15/2017, and to be accomplished within 5-7 day(s) (8/15/2017)  To be determined pending medical follow-up/intervention. Long Term Goals  Initiated 2017, re-assessed 8/15/2017, and to be accomplished within 10-14 day(s) (2017)  To be determined pending medical follow-up/intervention. PHYSICAL THERAPY DAILY NOTE  Patient Name:Mj Edwards  Time In: 1200  Time Out: 7732  Diagnosis: Sepsis  Sepsis associated with internal vascular access, subsequent encounter ([facility]  T82. 7XXA, I99.8  INFECTED BYPASS GRAFT/ISCHEMIA LEFT LEG Sepsis associated with internal vascular access [facility])  Precautions: Fall Risk Precautions     Subjective: Pt reports, \"I'm not sure what I can do,\" but is agreeable to attempt participation to promote maintenance of functional strength in preparation for medical intervention. Pt chooses to attempt LE therapeutic exercise during first treatment attempt. Pain at start of tx: 10/10 L foot/LE  Pain at stop of tx: 10/10 L foot/LE     Patient identified with name and : yes         Objective:         WHEELCHAIR MOBILITY Daily Assessment     Pt propels w/c on unit over level surfaces with modified independence. LOWER EXTREMITY EXERCISES Daily Assessment     Extremity: Both  Exercise Type #1: Seated lower extremity strengthening  Sets Performed: 3  Reps Performed: 10   Isometric Hip Add x 5 \" holds  Alternate Hip Flex     Attempted LAQ - pt able to complete 10 repetitions with R LE but unable to tolerate performing with L LE secondary to pain              Assessment: Pt continues to demonstrate decreased ability/inability to participate in activities to address improved functional mobility secondary to L LE pain and awaiting medical intervention.              Plan of Care: Continue to encourage pt to perform mobility to the best of his ability as able to maintain functional strength.      Jamel Friend, PT, DPT  8/16/2017

## 2017-08-17 ENCOUNTER — HOSPITAL ENCOUNTER (INPATIENT)
Age: 57
LOS: 8 days | Discharge: NURSING, REHAB OR LONG-TERM CARE | DRG: 239 | End: 2017-08-25
Attending: SURGERY | Admitting: SURGERY
Payer: COMMERCIAL

## 2017-08-17 VITALS
OXYGEN SATURATION: 94 % | BODY MASS INDEX: 23.72 KG/M2 | RESPIRATION RATE: 18 BRPM | TEMPERATURE: 98 F | SYSTOLIC BLOOD PRESSURE: 144 MMHG | DIASTOLIC BLOOD PRESSURE: 70 MMHG | HEART RATE: 67 BPM | WEIGHT: 165.34 LBS

## 2017-08-17 LAB
HCT VFR BLD AUTO: 29.8 % (ref 36–48)
HGB BLD-MCNC: 9.7 G/DL (ref 13–16)
INR PPP: 1.2 (ref 0.8–1.2)
PROTHROMBIN TIME: 14.5 SEC (ref 11.5–15.2)

## 2017-08-17 PROCEDURE — 74011250636 HC RX REV CODE- 250/636: Performed by: INTERNAL MEDICINE

## 2017-08-17 PROCEDURE — 85018 HEMOGLOBIN: CPT | Performed by: INTERNAL MEDICINE

## 2017-08-17 PROCEDURE — 65270000029 HC RM PRIVATE

## 2017-08-17 PROCEDURE — 77030019934 HC DRSG VAC ASST KCON -B

## 2017-08-17 PROCEDURE — 74011250637 HC RX REV CODE- 250/637: Performed by: INTERNAL MEDICINE

## 2017-08-17 PROCEDURE — 36415 COLL VENOUS BLD VENIPUNCTURE: CPT | Performed by: INTERNAL MEDICINE

## 2017-08-17 PROCEDURE — 85610 PROTHROMBIN TIME: CPT | Performed by: INTERNAL MEDICINE

## 2017-08-17 RX ORDER — HYDROCODONE BITARTRATE AND ACETAMINOPHEN 5; 325 MG/1; MG/1
2 TABLET ORAL
Status: DISCONTINUED | OUTPATIENT
Start: 2017-08-17 | End: 2017-08-22

## 2017-08-17 RX ADMIN — HYDROCODONE BITARTRATE AND ACETAMINOPHEN 1 TABLET: 10; 325 TABLET ORAL at 14:25

## 2017-08-17 RX ADMIN — CEFAZOLIN SODIUM 2 G: 2 SOLUTION INTRAVENOUS at 14:10

## 2017-08-17 RX ADMIN — GABAPENTIN 300 MG: 300 CAPSULE ORAL at 05:11

## 2017-08-17 RX ADMIN — Medication 1 CAPSULE: at 09:50

## 2017-08-17 RX ADMIN — DILTIAZEM HYDROCHLORIDE 30 MG: 30 TABLET, FILM COATED ORAL at 05:13

## 2017-08-17 RX ADMIN — GABAPENTIN 300 MG: 300 CAPSULE ORAL at 14:16

## 2017-08-17 RX ADMIN — CEFAZOLIN SODIUM 2 G: 2 SOLUTION INTRAVENOUS at 05:08

## 2017-08-17 RX ADMIN — AMIODARONE HYDROCHLORIDE 200 MG: 200 TABLET ORAL at 09:53

## 2017-08-17 RX ADMIN — SODIUM CHLORIDE TAB 1 GM 1 G: 1 TAB at 09:52

## 2017-08-17 RX ADMIN — POTASSIUM CHLORIDE 20 MEQ: 20 TABLET, EXTENDED RELEASE ORAL at 09:53

## 2017-08-17 RX ADMIN — CYANOCOBALAMIN TAB 1000 MCG 1000 MCG: 1000 TAB at 09:54

## 2017-08-17 RX ADMIN — FUROSEMIDE 20 MG: 20 TABLET ORAL at 05:11

## 2017-08-17 RX ADMIN — DOCUSATE SODIUM 100 MG: 100 CAPSULE, LIQUID FILLED ORAL at 09:49

## 2017-08-17 RX ADMIN — HYDROCODONE BITARTRATE AND ACETAMINOPHEN 2 TABLET: 10; 325 TABLET ORAL at 03:45

## 2017-08-17 RX ADMIN — Medication 400 MG: at 09:54

## 2017-08-17 RX ADMIN — HYDROMORPHONE HYDROCHLORIDE 1 MG: 1 INJECTION, SOLUTION INTRAMUSCULAR; INTRAVENOUS; SUBCUTANEOUS at 01:30

## 2017-08-17 RX ADMIN — DULOXETINE HYDROCHLORIDE 60 MG: 30 CAPSULE, DELAYED RELEASE ORAL at 09:49

## 2017-08-17 RX ADMIN — VITAMIN D, TAB 1000IU (100/BT) 1000 UNITS: 25 TAB at 09:50

## 2017-08-17 RX ADMIN — ZINC SULFATE 220 MG (50 MG) CAPSULE 220 MG: CAPSULE at 09:52

## 2017-08-17 RX ADMIN — METOPROLOL TARTRATE 12.5 MG: 25 TABLET ORAL at 09:51

## 2017-08-17 RX ADMIN — COLLAGENASE SANTYL: 250 OINTMENT TOPICAL at 11:35

## 2017-08-17 RX ADMIN — Medication 250 MG: at 09:53

## 2017-08-17 RX ADMIN — DILTIAZEM HYDROCHLORIDE 30 MG: 30 TABLET, FILM COATED ORAL at 11:32

## 2017-08-17 RX ADMIN — LOSARTAN POTASSIUM 50 MG: 50 TABLET ORAL at 05:11

## 2017-08-17 RX ADMIN — ASPIRIN 81 MG 81 MG: 81 TABLET ORAL at 09:53

## 2017-08-17 NOTE — IP AVS SNAPSHOT
Dao Enamorado 
 
 
 [address] Patient: Herlinda Thomson MRN: [id number] USY:[id number] You are allergic to the following Allergen Reactions Morphine Other (comments) Patient gets confused with morphine. Recent Documentation Weight BMI Smoking Status 70.8 kg 22.38 kg/m2 Former Smoker Unresulted Labs Order Current Status CULTURE, FUNGUS Preliminary result Emergency Contacts Name Discharge Info Relation Home Work Mobile Lynnette Caro DISCHARGE CAREGIVER [3] Daughter [21] [phone] About your hospitalization You were admitted on:  August 17, 2017 You last received care in the:  [facility] [address] 1982 You were discharged on:  August 25, 2017 Unit phone number:  [phone] Why you were hospitalized Your primary diagnosis was:  Ischemic Rest Pain Of Lower Extremity (Hcc) Your diagnoses also included:  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), Peripheral Artery Disease (Hcc), Acute Blood Loss As Cause Of Postoperative Anemia, Benign Hypertensive Heart Disease With Systolic Congestive Heart Failure, Nyha Class 2 (Hcc), Paroxysmal Atrial Fibrillation (Hcc), Chronic Systolic Heart Failure (Hcc), Infection Of Vascular Bypass Graft (Hcc), Ischemic Cardiomyopathy, Impaired Mobility And Adls, Prolonged Q-T Interval On Ecg, Status Post Above Knee Amputation Of Left Lower Extremity (Hcc), Aftercare Following Surgery Of The Circulatory System, Aftercare For Amputation Stump, Leukocytosis, Coronary Artery Disease Involving Native Coronary Artery Of Native Heart, Adverse Effect Of Amiodarone Providers Seen During Your Hospitalizations Provider Role Specialty Primary office phone Samina Dumont MD Attending Provider Vascular Surgery [phone] Your Primary Care Physician (PCP) Primary Care Physician Office Phone Office Fax Mauro Gordon [phone] Follow-up Information Follow up With Details Comments Contact Info Maxwell Vargas MD Go on 8/31/2017 @ 1:00 p.m. w/ Dr. Hortencia Leonardo. [address] [facility] 
[phone] Your Appointments Thursday August 31, 2017  1:00 PM EDT  
Tonia Hagen with Lewis Grande MD  
[facility] ([facility]) [address] 200 [facility]  
[phone] Thursday September 21, 2017  9:00 AM EDT HOSPITAL DISCHARGE with [address] [place] [address] [facility] ([facility]) [address] [facility] Sabrina Coopers [address] 200 [facility]  
[phone] Current Discharge Medication List  
  
START taking these medications Dose & Instructions Dispensing Information Comments Morning Noon Evening Bedtime  
 ampicillin-sulbactam 3 gram 3 g IVPB Your last dose was: Your next dose is:    
   
   
 Dose:  3 g  
3 g by IntraVENous route every six (6) hours. Quantity:  3 Dose Refills:  0  
     
   
   
   
  
 vancomycin 1,000 mg injection Commonly known as:  [employer] Your last dose was: Your next dose is: As per ID and based on vanc levels Quantity:  1000 mg Refills:  0  
     
   
   
   
  
 warfarin 5 mg tablet Commonly known as:  COUMADIN Your last dose was: Your next dose is:    
   
   
 Dose:  5 mg Take 1 Tab by mouth nightly. Quantity:  30 Tab Refills:  0 CONTINUE these medications which have CHANGED Dose & Instructions Dispensing Information Comments Morning Noon Evening Bedtime  
 metoprolol tartrate 25 mg tablet Commonly known as:  LOPRESSOR What changed:  how much to take Your last dose was:     
   
Your next dose is:    
   
   
 Dose:  25 mg  
 Take 1 Tab by mouth every twelve (12) hours. Indications: hypertension, VENTRICULAR RATE CONTROL IN ATRIAL FIBRILLATION Quantity:  30 Tab Refills:  6  
     
   
   
   
  
 potassium chloride 20 mEq packet Commonly known as:  KLOR-CON What changed:  when to take this Your last dose was: Your next dose is:    
   
   
 Dose:  20 mEq Take 1 Packet by mouth daily. Quantity:  60 Packet Refills:  0 CONTINUE these medications which have NOT CHANGED Dose & Instructions Dispensing Information Comments Morning Noon Evening Bedtime  
 amiodarone 200 mg tablet Commonly known as:  CORDARONE Your last dose was: Your next dose is:    
   
   
 Dose:  200 mg Take 1 Tab by mouth daily. Indications: VENTRICULAR RATE CONTROL IN ATRIAL FIBRILLATION Quantity:  60 Tab Refills:  0  
     
   
   
   
  
 ascorbic acid (vitamin C) 250 mg tablet Commonly known as:  VITAMIN C Your last dose was: Your next dose is:    
   
   
 Dose:  250 mg Take 1 Tab by mouth daily (with breakfast). Quantity:  90 Tab Refills:  2  
     
   
   
   
  
 aspirin 81 mg chewable tablet Your last dose was: Your next dose is:    
   
   
 Dose:  81 mg Take 1 Tab by mouth daily (with breakfast). Quantity:  90 Tab Refills:  3  
     
   
   
   
  
 atorvastatin 40 mg tablet Commonly known as:  LIPITOR Your last dose was: Your next dose is:    
   
   
 Dose:  40 mg Take 1 Tab by mouth nightly. Indications: DYSLIPIDEMIA Quantity:  90 Tab Refills:  3 cholecalciferol 1,000 unit tablet Commonly known as:  VITAMIN D3 Your last dose was: Your next dose is:    
   
   
 Dose:  1000 Units Take 1 Tab by mouth daily. Quantity:  90 Tab Refills:  3  
     
   
   
   
  
 cyanocobalamin 1,000 mcg tablet Commonly known as:  VITAMIN B-12 Your last dose was: Your next dose is:    
   
   
 Dose:  1000 mcg Take 1 Tab by mouth daily. Quantity:  90 Tab Refills:  3  
     
   
   
   
  
 docusate sodium 100 mg capsule Commonly known as:  [place] Breech Your last dose was: Your next dose is:    
   
   
 Dose:  100 mg Take 1 Cap by mouth daily for 90 days. Quantity:  30 Cap Refills:  0  
     
   
   
   
  
 ferrous sulfate 325 mg (65 mg iron) tablet Your last dose was: Your next dose is:    
   
   
 Dose:  325 mg Take 1 Tab by mouth daily (with breakfast). Quantity:  90 Tab Refills:  3  
     
   
   
   
  
 furosemide 20 mg tablet Commonly known as:  LASIX Your last dose was: Your next dose is:    
   
   
 1 tablet daily Quantity:  30 Tab Refills:  0  
     
   
   
   
  
 gabapentin 300 mg capsule Commonly known as:  NEURONTIN Your last dose was: Your next dose is:    
   
   
 Dose:  300 mg Take 1 Cap by mouth every eight (8) hours. Indications: NEUROPATHIC PAIN Quantity:  45 Cap Refills:  0  
     
   
   
   
  
 lactobacillus sp. 50 billion cpu 50 billion cell -375 mg Cap capsule Commonly known as:  BIO-K PLUS Your last dose was: Your next dose is:    
   
   
 Dose:  1 Cap Take 1 Cap by mouth daily. Quantity:  15 Cap Refills:  0  
     
   
   
   
  
 losartan 50 mg tablet Commonly known as:  COZAAR Your last dose was: Your next dose is:    
   
   
 Dose:  50 mg Take 1 Tab by mouth daily. Indications: Chronic Heart Failure, hypertension Quantity:  15 Tab Refills:  0  
     
   
   
   
  
 magnesium oxide 400 mg tablet Commonly known as:  MAG-OX Your last dose was: Your next dose is:    
   
   
 Dose:  400 mg Take 1 Tab by mouth daily. Quantity:  15 Tab Refills:  0  
     
   
   
   
  
 polyethylene glycol 17 gram packet Commonly known as:  [place] Gram Your last dose was: Your next dose is:    
   
   
 Dose:  17 g Take 1 Packet by mouth daily. Quantity:  30 Packet Refills:  0  
     
   
   
   
  
 zinc sulfate 220 (50) mg capsule Commonly known as:  ZINCATE Your last dose was: Your next dose is:    
   
   
 Dose:  220 mg Take 1 Cap by mouth daily. Quantity:  90 Cap Refills:  0 STOP taking these medications ceFAZolin in dextrose (iso-os) 2 gram/50 mL Pgbk  
   
  
 collagenase 250 unit/gram ointment Commonly known as:  SANTYL  
   
  
 dilTIAZem 30 mg tablet Commonly known as:  CARDIZEM  
   
  
 DULoxetine 60 mg capsule Commonly known as:  CYMBALTA  
   
  
 oxyCODONE-acetaminophen  mg per tablet Commonly known as:  PERCOCET 10 Where to Get Your Medications Information on where to get these meds will be given to you by the nurse or doctor. ! Ask your nurse or doctor about these medications  
  ampicillin-sulbactam 3 gram 3 g IVPB  
 metoprolol tartrate 25 mg tablet  
 potassium chloride 20 mEq packet  
 vancomycin 1,000 mg injection  
 warfarin 5 mg tablet Discharge Instructions Below-the-Knee Leg Amputation: What to Expect at [facility] Your Recovery A below-the-knee amputation is surgery to remove your leg below the knee. Your doctor removed the leg while keeping as much healthy bone, skin, blood vessel, and nerve tissue as possible. After a below-the-knee amputation, you will probably have bandages, a rigid dressing, or a cast over the remaining part of your leg (remaining limb). The leg will be swollen for at least 4 weeks after your surgery. If you have a rigid dressing or cast, your doctor will set up regular visits to change the dressing or cast and check the healing. If you have elastic bandages, your doctor will tell you how to change them. You may have pain in your remaining limb.  You also may think you have feeling or pain where your leg was. This is called phantom pain. It is common and may come and go for a year or longer. Your doctor can give you medicine for both types of pain. You may have already started a rehabilitation program (rehab). You will continue this under the guidance of your doctor or physical therapist. Jade Beal will need to do a lot of work to recondition your muscles and relearn activities, balance, and coordination. The rehab can last as long as a year. You may have been fitted with a temporary artificial leg while you were still in the hospital. If this is the case, your doctor will teach you how to care for it. If you are getting an artificial leg, you may need to get used to it before you return to work and your other activities. You will probably not wear it all the time, so you will need to learn how to use a wheelchair, crutches, or other device. You will have to make changes in your home. Your workplace may be able to make allowances for you. Having your leg amputated is traumatic. Learning to live with new limitations can be hard and frustrating. You may feel depressed or grieve for your previous lifestyle. It is important to understand these feelings. Talking with your family, friends, and health professionals about your frustrations is an important part of your recovery. You may also find that it helps to talk with a person who has had an amputation. Remember that even though losing a limb is difficult, it does not change who you are or prevent you from enjoying life. You will have to adapt and learn new ways to do things, but you will still be able to work and take part in sports and activities. And you can still learn, love, play, and live life to its fullest. 
Many organizations can help you adjust to your new life. For example, you can find information at amputee-coalition.org.  
This care sheet gives you a general idea about how long it will take for you to recover. But each person recovers at a different pace. Follow the steps below to get better as quickly as possible. How can you care for yourself at home? Activity · Be active. Talk to your doctor about what you can do. If you are active and use your remaining limb, it will heal faster. · You may shower when your doctor okays it. Wash the remaining limb with soap and water, and pat it dry. You may need help doing this at first. 
· You may need to adapt your car to your situation before you drive. · You will probably be able to return to work and your usual routine when your remaining limb heals. This can be as soon as 4 to 8 weeks after surgery, but it may take longer. Diet · You can eat your normal diet. If your stomach is upset, try bland, low-fat foods like plain rice, broiled chicken, toast, and yogurt. · You may notice that your bowel movements are not regular right after your surgery. This is common. Try to avoid constipation and straining with bowel movements. Take a fiber supplement every day. If you have not had a bowel movement after a couple of days, ask your doctor about taking a mild laxative. Medicines · Your doctor will tell you if and when you can restart your medicines. He or she will also give you instructions about taking any new medicines. · If you take blood thinners, such as warfarin (Coumadin), clopidogrel (Plavix), or aspirin, be sure to talk to your doctor. He or she will tell you if and when to start taking those medicines again. Make sure that you understand exactly what your doctor wants you to do. · Be safe with medicines. Take pain medicines exactly as directed. ¨ If the doctor gave you a prescription medicine for pain, take it as prescribed. ¨ If you are not taking a prescription pain medicine, ask your doctor if you can take an over-the-counter medicine.  
· If you think your pain medicine is making you sick to your stomach: 
 ¨ Take your medicine after meals (unless your doctor has told you not to). ¨ Ask your doctor for a different pain medicine. · If your doctor prescribed antibiotics, take them as directed. Do not stop taking them just because you feel better. You need to take the full course of antibiotics. Remaining limb care · You may have bandages, a rigid dressing, or a cast on your remaining limb. Your doctor will tell you how to take care of it. Depending on your dressing and the doctor's instructions: 
¨ Check your remaining limb daily for irritation, skin breaks, and redness. Tell your doctor about any problems you see. ¨ Wash your remaining limb with mild soap and warm water every night. Pat it dry. · If you have a temporary artificial leg, remove it before you go to sleep. Exercise · Rehabilitation is a series of exercises you do after your surgery. This helps you learn to use your remaining limb and artificial leg. You will work with your doctor and physical therapist to plan this exercise program. To get the best results, you need to do the exercises correctly and as often and as long as your doctor tells you. Your rehab program will give you a number of exercises to do. Always do them as your therapist tells you. Other instructions · Preventing contractures is very important. A contracture occurs when a joint becomes stuck in one position. If this happens, it may be hard or impossible to straighten your remaining limb and use an artificial leg. ¨ Make sure you put equal weight on both hips when you sit. Use firm chairs, and sit up straight. ¨ Keep your remaining limb flat with your knees straight and your legs together while you are lying on your back. ¨ Lie on your stomach as much as possible to stretch your hip joint. ¨ Do not sit for more than an hour or two. Stand, or lie on your stomach now and then. ¨ Do not put pillows under your hips or knees or between your thighs. ¨ Do not rest your remaining limb on crutch handles or a wheelchair. Follow-up care is a key part of your treatment and safety. Be sure to make and go to all appointments, and call your doctor if you are having problems. It's also a good idea to know your test results and keep a list of the medicines you take. When should you call for help? Call 911 anytime you think you may need emergency care. For example, call if: 
· You passed out (lost consciousness). · You have sudden chest pain and shortness of breath, or you cough up blood. · You have severe trouble breathing. Call your doctor now or seek immediate medical care if: 
· You have loose stitches, or your incision comes open. · You have bleeding from the incision in your remaining limb that suddenly increases or does not stop when your doctor said it should. · You have signs of infection, such as: 
¨ Increased pain, swelling, warmth, or redness. ¨ Red streaks leading from the incision. ¨ Pus draining from the incision. ¨ A fever. · You are sick to your stomach or cannot keep fluids down. · You have pain that does not get better after you take pain medicine. Watch closely for any changes in your health, and be sure to contact your doctor if: 
· You do not have a bowel movement after taking a laxative. Where can you learn more? Go to http://yuniel-lyndsay.info/. Enter C870 in the search box to learn more about \"Below-the-Knee Leg Amputation: What to Expect at Home. \" Current as of: March 21, 2017 Content Version: 11.3 © 9466-5659 Healthwise, Incorporated. Care instructions adapted under license by [employer] (which disclaims liability or warranty for this information). If you have questions about a medical condition or this instruction, always ask your healthcare professional. [place] [address] any warranty or liability for your use of this information. Patient armband removed and shredded [facility] Activation Thank you for requesting access to [employer]. Please follow the instructions below to securely access and download your online medical record. Benchling allows you to send messages to your doctor, view your test results, renew your prescriptions, schedule appointments, and more. How Do I Sign Up? 1. In your internet browser, go to [email or web address] 
2. Click on the First Time User? Click Here link in the Sign In box. You will be redirect to the New Member Sign Up page. 3. Enter your Benchling Access Code exactly as it appears below. You will not need to use this code after youve completed the sign-up process. If you do not sign up before the expiration date, you must request a new code. Benchling Access Code: Activation code not generated Current Benchling Status: Patient Declined (This is the date your Benchling access code will ) 4. Enter the last four digits of your Social Security Number (xxxx) and Date of Birth (mm/dd/yyyy) as indicated and click Submit. You will be taken to the next sign-up page. 5. Create a Benchling ID. This will be your Benchling login ID and cannot be changed, so think of one that is secure and easy to remember. 6. Create a Benchling password. You can change your password at any time. 7. Enter your Password Reset Question and Answer. This can be used at a later time if you forget your password. 8. Enter your e-mail address. You will receive e-mail notification when new information is available in [address] 9. Click Sign Up. You can now view and download portions of your medical record. 10. Click the Download Summary menu link to download a portable copy of your medical information. Additional Information If you have questions, please visit the Frequently Asked Questions section of the Benchling website at [email or web address] Remember, Benchling is NOT to be used for urgent needs. For medical emergencies, dial 911. DISCHARGE SUMMARY from Nurse The following personal items are in your possession at time of discharge: 
 
Dental Appliances: Lowers, Uppers Visual Aid: None PATIENT INSTRUCTIONS: 
 
 
F-face looks uneven A-arms unable to move or move unevenly S-speech slurred or non-existent T-time-call 911 as soon as signs and symptoms begin-DO NOT go Back to bed or wait to see if you get better-TIME IS BRAIN. Warning Signs of HEART ATTACK Call 911 if you have these symptoms: 
? Chest discomfort. Most heart attacks involve discomfort in the center of the chest that lasts more than a few minutes, or that goes away and comes back. It can feel like uncomfortable pressure, squeezing, fullness, or pain. ? Discomfort in other areas of the upper body. Symptoms can include pain or discomfort in one or both arms, the back, neck, jaw, or stomach. ? Shortness of breath with or without chest discomfort. ? Other signs may include breaking out in a cold sweat, nausea, or lightheadedness. Don't wait more than five minutes to call [address]! Fast action can save your life. Calling 911 is almost always the fastest way to get lifesaving treatment. Emergency Medical Services staff can begin treatment when they arrive  up to an hour sooner than if someone gets to the hospital by car. The discharge information has been reviewed with the patient. The patient verbalized understanding. Discharge medications reviewed with the patient and appropriate educational materials and side effects teaching were provided. Discharge Instructions Attachments/References SMOKING: STOPPING (ENGLISH) Discharge Orders Procedure Order Date Status Priority Quantity Spec Type Associated Dx DIET CARDIAC No options chosen 08/24/17 1302 Normal Routine 1 Questions: Additional options:  No options chosen [facility] Announcement We are excited to announce that we are making your provider's discharge notes available to you in [place]. You will see these notes when they are completed and signed by the physician that discharged you from your recent hospital stay. If you have any questions or concerns about any information you see in [facility] [employer], please call the Health Information Department where you were seen or reach out to your Primary Care Provider for more information about your plan of care. General Information Please provide this summary of care documentation to your next provider. Patient Signature:  ____________________________________________________________ Date:  ____________________________________________________________  
  
Melissa Umana Provider Signature:  ____________________________________________________________ Date:  ____________________________________________________________ More Information Stopping Smoking: Care Instructions Your Care Instructions Cigarette smokers crave the nicotine in cigarettes. Giving it up is much harder than simply changing a habit. Your body has to stop craving the nicotine. It is hard to quit, but you can do it. There are many tools that people use to quit smoking. You may find that combining tools works best for you. There are several steps to quitting. First you get ready to quit. Then you get support to help you. After that, you learn new skills and behaviors to become a nonsmoker. For many people, a necessary step is getting and using medicine. Your doctor will help you set up the plan that best meets your needs. You may want to attend a smoking cessation program to help you quit smoking. When you choose a program, look for one that has proven success. Ask your doctor for ideas. You will greatly increase your chances of success if you take medicine as well as get counseling or join a cessation program. 
Some of the changes you feel when you first quit tobacco are uncomfortable. Your body will miss the nicotine at first, and you may feel short-tempered and grumpy. You may have trouble sleeping or concentrating. Medicine can help you deal with these symptoms. You may struggle with changing your smoking habits and rituals. The last step is the tricky one: Be prepared for the smoking urge to continue for a time. This is a lot to deal with, but keep at it. You will feel better. Follow-up care is a key part of your treatment and safety. Be sure to make and go to all appointments, and call your doctor if you are having problems. Its also a good idea to know your test results and keep a list of the medicines you take. How can you care for yourself at home? · Ask your family, friends, and coworkers for support. You have a better chance of quitting if you have help and support. · Join a support group, such as Nicotine Anonymous, for people who are trying to quit smoking. · Consider signing up for a smoking cessation program, such as the American Lung Association's Freedom from Smoking program. 
· Set a quit date. Pick your date carefully so that it is not right in the middle of a big deadline or stressful time. Once you quit, do not even take a puff. Get rid of all ashtrays and lighters after your last cigarette. Clean your house and your clothes so that they do not smell of smoke. · Learn how to be a nonsmoker. Think about ways you can avoid those things that make you reach for a cigarette. ¨ Avoid situations that put you at greatest risk for smoking. For some people, it is hard to have a drink with friends without smoking. For others, they might skip a coffee break with coworkers who smoke. ¨ Change your daily routine. Take a different route to work or eat a meal in a different place. · Cut down on stress. Calm yourself or release tension by doing an activity you enjoy, such as reading a book, taking a hot bath, or gardening. · Talk to your doctor or pharmacist about nicotine replacement therapy, which replaces the nicotine in your body. You still get nicotine but you do not use tobacco. Nicotine replacement products help you slowly reduce the amount of nicotine you need. These products come in several forms, many of them available over-the-counter: ¨ Nicotine patches ¨ Nicotine gum and lozenges ¨ Nicotine inhaler · Ask your doctor about bupropion (Wellbutrin) or varenicline (Chantix), which are prescription medicines. They do not contain nicotine. They help you by reducing withdrawal symptoms, such as stress and anxiety. · Some people find hypnosis, acupuncture, and massage helpful for ending the smoking habit. · Eat a healthy diet and get regular exercise. Having healthy habits will help your body move past its craving for nicotine. · Be prepared to keep trying. Most people are not successful the first few times they try to quit. Do not get mad at yourself if you smoke again. Make a list of things you learned and think about when you want to try again, such as next week, next month, or next year. Where can you learn more? Go to http://yuniel-lyndsay.info/. Enter Z019 in the search box to learn more about \"Stopping Smoking: Care Instructions. \" Current as of: March 20, 2017 Content Version: 11.3 © 3734-9084 [employer].  Care instructions adapted under license by [employer] (which disclaims liability or warranty for this information). If you have questions about a medical condition or this instruction, always ask your healthcare professional. [place] [address] any warranty or liability for your use of this information.

## 2017-08-17 NOTE — ROUTINE PROCESS
SHIFT CHANGE NOTE FOR [facility]    Bedside and Verbal shift change report given to BEAU Cody (oncoming nurse) by Jorgito Herring RN   (offgoing nurse). Report included the following information SBAR, Kardex, MAR and Recent Results. Situation:   Code Status: Full Code   Reason for Admission: sepsis  Hospital Day: 10   Problem List:   Hospital Problems  Date Reviewed: 8/16/2017          Codes Class Noted POA    Ischemic rest pain of lower extremity ([facility] [address] ICD-10-CM: I73.9  ICD-9-CM: 443.9  8/14/2017 Yes    Overview Signed 8/15/2017 11:09 AM by Yoseph Montano MD     Left             Chronic ulcer of right heel ([facility] [address] (Chronic) ICD-10-CM: L97.419  ICD-9-CM: 707.14  8/8/2017 Yes        Acute blood loss as cause of postoperative anemia ICD-10-CM: D62  ICD-9-CM: 285.1  7/25/2017 Yes        Aftercare following surgery of the circulatory system ICD-10-CM: Z48.812  ICD-9-CM: V58.73  7/25/2017 Yes    Overview Addendum 8/7/2017  2:20 PM by Yoseph Montano MD     S/P Removal of infected graft; Repair of left superficial femoral artery; Repair of left common femoral artery (7/25/2017 - Dr. Malu Chahal)             Impaired mobility and ADLs ICD-10-CM: Z74.09  ICD-9-CM: 799.89  7/24/2017 Yes        Infection of vascular bypass graft ([facility] [address] ICD-10-CM: T82. 7XXA  ICD-9-CM: 996.62  7/24/2017 Yes    Overview Signed 8/7/2017  2:19 PM by Yoseph Montano MD     Left lower extremity             * (Principal)Sepsis associated with internal vascular access [facility]) ICD-10-CM: T82. 7XXA, A41.9  ICD-9-CM: 996.62, 038.9  7/24/2017 Yes        Anticoagulated on Coumadin (Chronic) ICD-10-CM: Z51.81, Z79.01  ICD-9-CM: V58.83, V58.61  Unknown Yes        Chronic atrial fibrillation (HCC) (Chronic) ICD-10-CM: I48.2  ICD-9-CM: 427.31  Unknown Yes        Benign hypertensive heart disease with systolic congestive heart failure, NYHA class 2 (HCC) (Chronic) ICD-10-CM: I11.0, I50.20  ICD-9-CM: 402.11, 428.20, 428.0  1/26/2015 Yes Background:   Past Medical History:   Past Medical History:   Diagnosis Date    Acute blood loss as cause of postoperative anemia 4/4/2017    Anticoagulated on Coumadin     Aortoiliac occlusive disease (HCC) 1/25/2017    Atherosclerosis of native artery of both lower extremities with intermittent claudication ([facility] [address] 1/25/2017    Benign hypertensive heart disease with systolic congestive heart failure, NYHA class 2 ([facility] [address] 1/26/2015    Carotid artery disease (HCC)     Chronic anemia     Chronic atrial fibrillation (HCC)     Chronic systolic heart failure (HCC)     Chronic ulcer of right foot ([address] 4/4/2017    Chronic ulcer of right heel ([facility] [address] 8/8/2017    Coronary artery disease involving native coronary artery of native heart 3/15/2017    Successful stenting of Cx (Xience LESLEY) and RCA (Xience LESLEY) to 0% by Dr. Pina Moe on 3/15/17.  DDD (degenerative disc disease), lumbar 1/26/2015    Dyslipidemia     Erectile dysfunction 7/5/2016    Euthyroid sick syndrome 4/6/2017    Hereditary peripheral neuropathy 11/15/2016    History of cardioversion 4/18/2017    S/P Synchronized external cardioversion (4/18/2017 - Dr. Kurtis Bright)    History of vitamin D deficiency 4/22/2017    Vitamin D 25-Hydroxy (4/22/2017) = 12.0; Vitamin D 25-Hydroxy (5/26/2017) = 38.7    Infection of vascular bypass graft ([facility] [address] 7/24/2017    Left lower extremity    Ischemic cardiomyopathy     Peripheral artery disease ([facility] [address] 1/25/2017    Spinal stenosis of lumbar region with radiculopathy 5/4/2015    Dr. Luke Benton      Patient taking anticoagulants no    Patient has a defibrillator: no     Assessment:   Changes in Assessment throughout shift: no     Patient has central line: no Reasons if yes: Insertion date: Last dressing date:   Patient has Jiang Cath: no Reasons if yes:     Insertion date:     Last Vitals:     Vitals:    08/16/17 0810 08/16/17 1627 08/16/17 2012 08/17/17 0511   BP: 138/67 126/63 132/67 128/72 Pulse: 69 65 100 100   Resp: 17 18 18    Temp: 97.7 °F (36.5 °C) 96.9 °F (36.1 °C) 98.6 °F (37 °C)    SpO2: 100% 97% 99%    Weight:            PAIN    Pain Assessment    Pain Intensity 1: 2 (08/17/17 0430) Pain Intensity 1: 2 (12/29/14 1105)    Pain Location 1: Leg Pain Location 1: Abdomen    Pain Intervention(s) 1: Medication (see MAR) Pain Intervention(s) 1: Medication (see MAR)  Patient Stated Pain Goal: 0 Patient Stated Pain Goal: 0  o Intervention effective: no    o Other actions taken for pain: pain medicine     Skin Assessment  Skin color Skin Color: Appropriate for ethnicity  Condition/Temperature Skin Condition/Temp: Dry, Warm  Integrity Skin Integrity: Blister, Tear, Wound (add Wound LDA)  Turgor Turgor: Non-tenting  Weekly Pressure Ulcer Documentation  Pressure  Injury Documentation: No Pressure Injury Noted-Pressure Ulcer Prevention Initiated  Wound Prevention & Protection Methods  Orientation of wound Orientation of Wound Prevention: Posterior  Location of Prevention Location of Wound Prevention: Sacrum/Coccyx  Dressing Present Dressing Present : No  Dressing Status Dressing Status: Intact  Wound Offloading Wound Offloading (Prevention Methods): Bed, pressure redistribution/air, Chair cushion, Pillows, Repositioning, Wheelchair     INTAKE/OUPUT    Date 08/16/17 0700 - 08/17/17 0659 08/17/17 0700 - 08/18/17 0659   Shift 8383-5689 7677-8890 24 Hour Total 5288-5157 2558-2828 24 Hour Total   I  N  T  A  K  E   P. O. 540  540         P. O. 540  540       Other 0  0         Intake (ml) (Vacuum Assisted Closure Left Groin) 0  0       Shift Total  (mL/kg) 540  (7.2)  540  (7.2)      O  U  T  P  U  T   Urine  (mL/kg/hr) 200  (0.2) 1200 1400         Urine Voided 200 1200 1400         Urine Occurrence(s) 3 x 2 x 5 x       Emesis/NG output  0 0         Emesis  0 0         Emesis Occurrence(s)  0 x 0 x       Drains 0  0         Output (ml) (Vacuum Assisted Closure Left Groin) 0  0       Stool  0 0         Stool Occurrence(s) 1 x 0 x 1 x         Stool  0 0       Shift Total  (mL/kg) 200  (2.7) 1200  (16) 1400  (18.7)       1200 860      Weight (kg) 75 75 75 75 75 75       Recommendations:  1. Patient needs and requests: pain medicine    2. Diet: cardiac    3. Pending tests/procedures: no     4. Functional Level/Equipment: 1 x person assist    5. Estimated Discharge Date: 08/17/17 Posted on Whiteboard in Patients Room: yes     HEALS Safety Check    A safety check occurred in the patient's room between off going nurse and oncoming nurse listed above. The safety check included the below items  Area Items   H  High Alert Medications - Verify all high alert medication drips (heparin, PCA, etc.)   E  Equipment - Suction is set up for ALL patients (with cailin)  - Red plugs utilized for all equipment (IV pumps, etc.)  - WOWs wiped down at end of shift.  - Room stocked with oxygen, suction, and other unit-specific supplies   A  Alarms - Bed alarm is set for fall risk patients  - Ensure chair alarm is in place and activated if patient is up in a chair   L  Lines - Check IV for any infiltration  - Jiang bag is empty if patient has a Jiang   - Tubing and IV bags are labeled   S  Safety   - Room is clean, patient is clean, and equipment is clean. - Hallways are clear from equipment besides carts. - Fall bracelet on for fall risk patients  - Ensure room is clear and free of clutter  - Suction is set up for ALL patients (with cailin)  - Hallways are clear from equipment besides carts.    - Isolation precautions followed, supplies available outside room, sign posted

## 2017-08-17 NOTE — DISCHARGE SUMMARY
[facility] PHYSICAL REHABILITATION  [address] [place] [address], [address]     INPATIENT REHABILITATION  DISCHARGE SUMMARY    Name: Teresa Curiel MRN: [id number]   Age / Sex: 59 y.o. / male CSN: [id number]   YOB: 1960 Length of Stay: 10 days   Admit Date: 8/7/2017 Discharge Date: 8/17/2017       PRIMARY CARE PHYSICIAN: Olga Lidia Olson MD      DISCHARGE DIAGNOSES:    Primary Rehabilitation Diagnosis  1. Impaired Mobility and ADLs  2. S/P Removal of infected graft; Repair of left superficial femoral artery; Repair of left common femoral artery (7/25/2017 - Dr. Cait Garcia);  History of sepsis associated with infection of vascular bypass graft      Comorbidities           Benign hypertensive heart disease with systolic congestive heart failure, NYHA class 2  I11.0, I50.20    DDD (degenerative disc disease), lumbar M51.36    Erectile dysfunction N52.9    Spinal stenosis of lumbar region with radiculopathy M48.06, M54.16    Hereditary peripheral neuropathy G60.9    Aortoiliac occlusive disease  I74.09    Atherosclerosis of native artery of both lower extremities with intermittent claudication  I70.213    Peripheral artery disease  I73.9    Coronary artery disease involving native coronary artery of native heart I25.10    Chronic atrial fibrillation  I48.2    Acute blood loss as cause of postoperative anemia D62    Impaired mobility and ADLs Z74.09    Anticoagulated on Coumadin Z51.81, Z79.01    Ischemic cardiomyopathy I25.5    Chronic systolic heart failure  H45.67    Carotid artery disease  I77.9    Hyperammonemia  E72.20    Dyslipidemia E78.5    Euthyroid sick syndrome E07.81    Aftercare following surgery of the circulatory system Z48.812    Status post femoral-popliteal bypass surgery Z95.828    History of cardioversion Z98.890    Critical ischemia of lower extremity I99.8    Chronic ulcer of right foot  L97.519    History of vitamin D deficiency Z86.39    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    Chronic anemia D64.9    Chronic ulcer of right heel  L97.419    Ischemic rest pain of lower extremity I73.9         CONSULTS CALLED: Vascular Surgery (Dr. Cait Garcia)      PROCEDURES DONE: None      BRIEF HISTORY: The patient is a 78-year-old White male with multiple medical comorbidities who was admitted to [facility] on 7/24/2017 due to severe sepsis. The patient was apparently well until ~2 days prior to admission, the patient woke upand was was unable to breath so he called 911 and he was brought to the [facility] Emergency Department. CT angiogram of the chest was negative for pulmonary embolism. 2D echocardiogram showed EF 50%, severe right heart dilatation with reduced right ventricular global systolic function. He was placed on pressors and diuresed with good improvement. His WBCs were noted to be elevated at 27.7K and he was started on intravenous antibiotics. Blood cultures were reportedly positive. He was noted to have an exposed graft in the left groin with purulent drainage. He denied any pain. He had improved clinically and had been weaned off pressors. He was transferred to [facility] for further treatment by his vascular surgeon. The patient was admitted under the service of Vascular Surgery (Dr. Cait Garcia). WBC count was 13.9. Patient was continued on Piperacillin-Tazobactam and Vancomycin. Critical Care Medicine consult (Dr. Aayush Angelo) was called for evaluation and comanagement. Cardiology consult (Dr. Dc Lamb) was called for evaluation and comanagement. The patient was brought to the operating room and underwent Removal of infected graft; Repair of left superficial femoral artery; Repair of left common femoral artery on 7/25/2017 done by Dr. Cait Garcia. The patient tolerated the procedure well with no intraoperative complications.  Blood culture drawn on 7/24/2017 yielded growth of MSSA. Surgical wound culture done 7/25 yielded growth of MSSA. Infectious Disease consult (Dr. Abdullahi Bean) was called for evaluation and comanagement. Piperacillin-Tazobactam and Vancomycin were discontinued and the patient was started on Nafcillin IV continuous infusion on 7/28/2017. Blood culture drawn on 7/28/2017 yielded no growth after 6 days. On 8/2/2017, the Nafcillin IV continuous infusion was changed to Cefazolin 2 grams IV q 8 hr. Blood culture drawn on 8/2/2017 yielded no growth after 6 days. Infectious Disease recommended the Cefazolin to be continued until 9/12/2017. The patient had remained hemodynamically stable but due to the above events, the patient was noted to be generally weak and with impaired mobility and ADLs. Patient was felt to be a good candidate for acute inpatient rehabilitation. Upon evaluation by Physical Therapy and Occupational Therapy, the patient was recommended for acute inpatient rehabilitation. WBC count on 8/7/2017 prior to discharge was 12.7. The patient was discharged and was subsequently admitted to the [facility] for intensive rehabilitation to help recover strength, function and mobility. COURSE IN THE HOSPITAL: Upon admission to the [facility], the patient underwent physical therapy, occupational therapy and speech therapy. The patient was able to actively participate in the rehabilitation activities and progressed well. On discharge, the patient was able to perform the following activities:    1.  Occupational Therapy    ON ADMISSION ON DISCHARGE   Eating  Functional Level: 6   Eating  Functional Level: 6     Grooming  Functional Level: 7   Grooming  Functional Level: 7     Bathing  Functional Level: 4   Bathing  Functional Level: 6     Upper Body Dressing  Functional Level: 5   Upper Body Dressing  Functional Level: 6     Lower Body Dressing  Functional Level: 3 Lower Body Dressing  Functional Level: 4     Toileting  Functional Level: 2   Toileting  Functional Level: 4     Toilet Transfers  Toilet Transfer Score: 3   Toilet Transfers  Toilet Transfer Score: 4     Tub /Shower Transfers  Tub/Shower Transfer Score: 3   Tub/Shower Transfers  Tub/Shower Transfer Score: 4       2. Physical Therapy    ON ADMISSION ON DISCHARGE   Wheelchair Mobility/Management  Able to Propel (ft): 232 feet  Functional Level: 5  Curbs/Ramps Assist Required (FIM Score): 0 (Not tested)  Wheelchair Setup Assist Required : 5 (Supervision/setup)  Wheelchair Management: Manages left brake, Manages right brake Wheelchair Mobility/Management  Able to Propel (ft): 232 feet  Functional Level: 5  Curbs/Ramps Assist Required (FIM Score): 0 (Not tested)  Wheelchair Setup Assist Required : 4 (Minimal assistance)  Wheelchair Management: Manages left brake, Manages right brake     Gait  Amount of Assistance: 4 (Minimal assistance)  Distance (ft): 12 Feet (ft) (x 2 repetitions)  Assistive Device: Walker, rolling Gait  Amount of Assistance: 4 (Minimal assistance)  Distance (ft): 50 Feet (ft)  Assistive Device: Walker, rolling     Balance-Sitting/Standing  Sitting - Static: Good (unsupported)  Sitting - Dynamic: Good (unsupported)  Standing - Static: Fair  Standing - Dynamic : Impaired Balance-Sitting/Standing  Sitting - Static: Good (unsupported)  Sitting - Dynamic: Good (unsupported)  Standing - Static: Fair  Standing - Dynamic : Impaired     Bed/Mat Mobility  Rolling Right : 5 (Supervision)  Rolling Left : 5 (Supervision)  Supine to Sit : 5 (Supervision)  Sit to Supine : 5 (Supervision) Bed/Mat Mobility  Rolling Right : 5 (Supervision)  Rolling Left : 5 (Supervision)  Supine to Sit : 5 (Supervision)  Sit to Supine : 5 (Supervision)     Transfers  Transfer Type: Other  Other: stand step with RW  Transfer Assistance : 3 (Moderate assistance ) (min-mod assist secondary to L LE pain)  Sit to Stand Assistance:  Moderate assistance (min-mod assist secondary to L LE pain)  Car Transfers: Not tested  Car Type: N/A Transfers  Transfer Type: Other  Other: stand step with RW  Transfer Assistance : 4 (Minimal assistance)  Sit to Stand Assistance: Minimal assistance  Car Transfers: Not tested  Car Type: N/A     Steps or Stairs  Steps/Stairs Ambulated (#): 0  Level of Assist : 0 (Not tested)  Rail Use: None Steps or Stairs  Steps/Stairs Ambulated (#):  (Unable to attempt secondary to increased L LUE pain, 10/10.)  Level of Assist : 4 (Contact guard assistance)  Rail Use: Both       3. Speech and Language Pathology    ON ADMISSION ON DISCHARGE   Comprehension (Native Language)  Primary Mode of Comprehension: Auditory  Score: 6 Comprehension (Native Language)  Primary Mode of Comprehension: Auditory  Score: 6     Expression (Native Language)  Primary Mode of Expression: Verbal  Score: 6   Expression (Native Language)  Primary Mode of Expression: Verbal  Score: 6     Social Interaction/Pragmatics  Score: 5 Social Interaction/Pragmatics  Score: 5     Problem Solving  Score: 5   Problem Solving  Score: 6     Memory  Score: 5 Memory  Score: 5       Legend:   7 - Independent   6 - Modified Independent   5 - Standby Assistance / Supervision / Set-up   4 - Minimum Assistance / Contact Guard Assistance   3 - Moderate Assistance   2 - Maximum Assistance   1 - Total Assistance / Dependent       ACUTE MEDICAL ISSUES ADDRESSED IN INPATIENT REHABILITATION FACILITY:     > S/P Removal of infected graft; Repair of left superficial femoral artery; Repair of left common femoral artery (7/25/2017 - Dr. Tres Jang);  History of sepsis associated with infection of vascular bypass graft   > (+) numbness from the middle of the left lower leg downwards   > On 8/10/2017, Vascular Surgery ([address] [place] [address] N, [facility]) was informed of the numbness and erythema of the shin of the left leg and she was aware of it; no diagnostic studies for now; Vascular Surgery will be evaluating the patient tomorrow to see if he needs further surgical intervention   > On 8/12/2017, patient complained of worsening pain of LLE at rest   > WBC count (8/13/2017) = 12.4   > On 8/14/2017, patient was seen and evaluated by JUNE Monet and discussed with Dr. Demarcus Colvin and it was felt that the patient has Ischemic rest pain of the left lower extremity and the patient will be scheduled for removal of infected right bypass graft with jump bypass and left AKA on Friday (8/18/2017)   > WBC count (8/15/2017) = 11.9   > Continue Cefazolin 2 grams IVPB q 8 hr until 9/13/2017    > Acute Postoperative Blood Loss Anemia   > Hgb/Hct (8/8/2017, on admission) = 8.7/27.0   > Anemia work-up showed serum iron 28, TIBC 211, iron % saturation 13, ferritin 1105, reticulocyte count 8.8   > On 8/8/2017:    > Discontinued FeSO4 325 mg PO once daily with breakfast     > Patient was given Epoetin constance 10,000 units SC x 1 dose   > Hgb/Hct (8/10/2017) = 8.7/26.6    > On 8/10/2017, patient was given Epoetin constance 10,000 units SC x 1 dose   > Hgb/Hct (8/13/2017) = 9.3/29.1    > Hgb/Hct (8/14/2017) = 10.1/30.4    > Hgb/Hct (8/15/2017) = 9.9/29.8   > Hgb/Hct (8/17/2017) = 9.7/29.8     > Benign hypertensive heart disease with chronic systolic heart failure   > On 8/8/2017, increased Losartan from 25 mg to 50 mg PO once daily (6AM)   > Continue:    > Diltiazem IR 30 mg PO TID AC and q HS    > Furosemide 20 mg PO once daily (6AM)    > Losartan 50 mg PO once daily (6AM)    > Metoprolol tartrate 12.5 mg PO q 12 hr (9AM, 9PM)    > Paroxysmal atrial fibrillation, presently normal sinus rhythm, anticoagulated on Coumadin   > On 8/8/2017, discontinued Enoxaparin 40 mg SC q 24 hr   > Continue:    > Amiodarone 200 mg PO once daily    > Metoprolol tartrate 12.5 mg PO q 12 hr (9AM, 9PM)   > On 8/15/2017, patient was given Phytonadione 2.5 mg PO x 1 dose    > Patient received no Coumadin last night (re: discontinued since 8/14/2017 as per Vascular Surgery as they are planning on surgical intervention on Friday)   > INR 1.2    > Chronic ulcer of right heel    > Podiatry consult (Dr. Loreto Escobar) called for recommendations on care   > Continue:    > Rigid heel suspension boots on both feet when supine in bed    > Collagenase ointment applied to right heel ulcer once daily, then cover with dry sterile dressing    > Analgesia              > During the patient's stay at the ARU, the patient was given:      > Norco 10/325 1 tab PO q 4 hr PRN for pain level greater than 4/10    > Hydromorphone 1 mg IV 3 times daily (6PM, 10PM, 2AM)    > Acetaminophen 650 mg PO q 4 hr PRN for pain level less than 5/10    > Na (8/8/2017, on admission) = 129   > On 8/8/2017, patient was started on NaCl 1 gram PO BID   > Na (8/12/2017) = 132   > Na (8/13/2017) = 130   > Na (8/14/2017) = 131    > Mg (8/8/2017, on admission) = 1.6   > On 8/8/2017, patient was started on Mg(OH)2 400 mg PO once daily   > Mg (8/14/2017) = 1.8      MEDICATIONS ON DISCHARGE:    Current Discharge Medication List      START taking these medications    Details   lactobacillus sp. 50 billion cpu (BIO-K PLUS) 50 billion cell -375 mg cap capsule Take 1 Cap by mouth daily. Qty: 15 Cap, Refills: 0    Associated Diagnoses: Infection of vascular bypass graft, subsequent encounter      magnesium oxide (MAG-OX) 400 mg tablet Take 1 Tab by mouth daily. Qty: 15 Tab, Refills: 0      collagenase (SANTYL) 250 unit/gram ointment Apply  to affected area daily. [right heel]  Indications: Skin Ulcer  Qty: 15 g, Refills: 0    Associated Diagnoses: Chronic ulcer of right heel ([address]         CONTINUE these medications which have CHANGED    Details   gabapentin (NEURONTIN) 300 mg capsule Take 1 Cap by mouth every eight (8) hours. Indications: NEUROPATHIC PAIN  Qty: 45 Cap, Refills: 0    Associated Diagnoses: Hereditary peripheral neuropathy      losartan (COZAAR) 50 mg tablet Take 1 Tab by mouth daily. Indications: Chronic Heart Failure, hypertension  Qty: 15 Tab, Refills: 0    Associated Diagnoses: Benign hypertensive heart disease with systolic congestive heart failure, NYHA class 2 ([facility] [address]         CONTINUE these medications which have NOT CHANGED    Details   docusate sodium (COLACE) 100 mg capsule Take 1 Cap by mouth daily for 90 days. Qty: 30 Cap, Refills: 0      furosemide (LASIX) 20 mg tablet 1 tablet daily  Qty: 30 Tab, Refills: 0      oxyCODONE-acetaminophen (PERCOCET 10)  mg per tablet Take 1-2 Tabs by mouth every four (4) hours as needed. Max Daily Amount: 12 Tabs. Qty: 80 Tab, Refills: 0      polyethylene glycol (MIRALAX) 17 gram packet Take 1 Packet by mouth daily. Qty: 30 Packet, Refills: 0      potassium chloride (KLOR-CON) 20 mEq packet Take 1 Packet by mouth two (2) times daily (with meals). Qty: 60 Packet, Refills: 0      CEFAZOLIN SODIUM/DEXTROSE,ISO (CEFAZOLIN IN DEXTROSE, ISO-OS,) 2 gram/50 mL pgbk 50 mL by IntraVENous route every eight (8) hours. Stop 9/13/17. Qty: 50 mL, Refills: 0      amiodarone (CORDARONE) 200 mg tablet Take 1 Tab by mouth daily. Indications: VENTRICULAR RATE CONTROL IN ATRIAL FIBRILLATION  Qty: 60 Tab, Refills: 0      aspirin 81 mg chewable tablet Take 1 Tab by mouth daily (with breakfast). Qty: 90 Tab, Refills: 3      cholecalciferol (VITAMIN D3) 1,000 unit tablet Take 1 Tab by mouth daily. Qty: 90 Tab, Refills: 3      ascorbic acid, vitamin C, (VITAMIN C) 250 mg tablet Take 1 Tab by mouth daily (with breakfast). Qty: 90 Tab, Refills: 2      DULoxetine (CYMBALTA) 60 mg capsule Take 1 Cap by mouth daily. Qty: 90 Cap, Refills: 2      zinc sulfate (ZINCATE) 220 (50) mg capsule Take 1 Cap by mouth daily. Qty: 90 Cap, Refills: 0      ferrous sulfate 325 mg (65 mg iron) tablet Take 1 Tab by mouth daily (with breakfast). Qty: 90 Tab, Refills: 3      atorvastatin (LIPITOR) 40 mg tablet Take 1 Tab by mouth nightly.  Indications: DYSLIPIDEMIA  Qty: 90 Tab, Refills: 3      metoprolol tartrate (LOPRESSOR) 25 mg tablet Take 0.5 Tabs by mouth every twelve (12) hours. Indications: hypertension, VENTRICULAR RATE CONTROL IN ATRIAL FIBRILLATION  Qty: 30 Tab, Refills: 6      dilTIAZem (CARDIZEM) 30 mg tablet Take 1 Tab by mouth Before breakfast, lunch, dinner and at bedtime. Indications: hypertension  Qty: 120 Tab, Refills: 6      cyanocobalamin (VITAMIN B-12) 1,000 mcg tablet Take 1 Tab by mouth daily. Qty: 90 Tab, Refills: 3         STOP taking these medications       warfarin (COUMADIN) 5 mg tablet Comments:   Reason for Stopping:           Estimated Glomerular Filtration Rate:  On admission, estimated GFR based on a Creatinine of 0.65 mg/dl:   Using CKD-EPI = 106.5 mL/min/1.73m2   Using MDRD = 133.6 mL/min/1.73m2  Most recent estimated GFR, based on a Creatinine of 0.76 mg/dl on 8/14/2017:   Using CKD-EPI = 99.9 mL/min/1.73m2   Using MDRD = 111.6 mL/min/1.73m2       DISCHARGE VITAL SIGNS:  Visit Vitals    /70    Pulse 67    Temp 98 °F (36.7 °C)    Resp 18    Wt 75 kg (165 lb 5.5 oz)    SpO2 94%    BMI 23.72 kg/m2       DISCHARGE PHYSICAL EXAMINATION:  GENERAL SURVEY: Patient is awake, alert, oriented x 3, sitting comfortably on the chair, not in acute respiratory distress.   HEENT: pale palpebral conjunctivae, anicteric sclerae, no nasoaural discharge, moist oral mucosa  NECK: supple, no jugular venous distention, no palpable lymph nodes  CHEST/LUNGS: symmetrical chest expansion, good air entry, clear breath sounds  HEART: adynamic precordium, good S1 S2, no S3, regular rhythm, no murmurs  ABDOMEN: flat, bowel sounds appreciated, soft, non-tender  EXTREMITIES: (+) WoundVac on left groin, (+) erythema on shin area of left leg, (+) ~1.5 cm x ~1.5 cm ulcer on right heel, (+) <1 cm x <1 cm wound above lateral malleolus of right ankle, pale nailbeds, trace bipedal edema, full and equal pulses, no calf tenderness   NEUROLOGICAL EXAM: The patient is awake, alert and oriented x3, able to answer questions fairly appropriately, able to follow 1 and 2 step commands.  Able to tell time from the wall clock.  Cranial nerves II-XII are grossly intact.  No gross sensory deficit except for numbness from the middle of the left lower leg downwards.  Motor strength is 4+/5 on BUE, 4+/5 on the RLE, 3+/5 on the left hip, 4-/5 on the left knee and left ankle.      Incision(s): healing well, clean, dry, and intact       CONDITION ON DISCHARGE: Stable. DISPOSITION: Patient was transferred to [facility] as an inpatient for removal of infected right bypass graft with jump bypass and left AKA on Friday (8/18/2017). FOLLOW-UP RECOMMENDATIONS:   Follow-up Information     Follow up With Details Comments Contact Info    Ada Orozco DPM On 9/5/2017  [address] [place] [address]      Devonte MD Indira Schedule an appointment as soon as possible for a visit  [address] [place] [address]      Ramon Manuel MD Schedule an appointment as soon as possible for a visit  [place] [address]  [phone]            OTHER INSTRUCTIONS:  1. Diet. Low saturated fat, high calorie, high protein diet. NPO post midnight (8/18/2017 at 12:15 AM)  2. Activity. As tolerated. 3. Safety / fall precautions. TIME SPENT ON DISCHARGE ACTIVITIES: More than 30 minutes.       Signed:  Fanta Reyes MD    8/17/2017

## 2017-08-17 NOTE — PROGRESS NOTES
Patient has had some periods of confusion during this shift . Easily reoriented. Has not really tried to get out of bed. Bed alarm s are on and patients room is right across from a nurse station.

## 2017-08-17 NOTE — HOME CARE
Pt is active to [facility] - will continue to follow - note plan to return to [address] today - possible return to ARU later [facility] will be available if needed - CARROLL Hall RN

## 2017-08-17 NOTE — IP AVS SNAPSHOT
[address] Patient: Carlyn Nuñez MRN: [id number] Current Discharge Medication List  
  
START taking these medications Dose & Instructions Dispensing Information Comments Morning Noon Evening Bedtime  
 ampicillin-sulbactam 3 gram 3 g IVPB Your last dose was: Your next dose is:    
   
   
 Dose:  3 g  
3 g by IntraVENous route every six (6) hours. Quantity:  3 Dose Refills:  0  
     
   
   
   
  
 vancomycin 1,000 mg injection Commonly known as:  Nucor [email or web address] Your last dose was: Your next dose is: As per ID and based on vanc levels Quantity:  1000 mg Refills:  0  
     
   
   
   
  
 warfarin 5 mg tablet Commonly known as:  COUMADIN Your last dose was: Your next dose is:    
   
   
 Dose:  5 mg Take 1 Tab by mouth nightly. Quantity:  30 Tab Refills:  0 CONTINUE these medications which have CHANGED Dose & Instructions Dispensing Information Comments Morning Noon Evening Bedtime  
 metoprolol tartrate 25 mg tablet Commonly known as:  LOPRESSOR What changed:  how much to take Your last dose was: Your next dose is:    
   
   
 Dose:  25 mg Take 1 Tab by mouth every twelve (12) hours. Indications: hypertension, VENTRICULAR RATE CONTROL IN ATRIAL FIBRILLATION Quantity:  30 Tab Refills:  6  
     
   
   
   
  
 potassium chloride 20 mEq packet Commonly known as:  KLOR-CON What changed:  when to take this Your last dose was: Your next dose is:    
   
   
 Dose:  20 mEq Take 1 Packet by mouth daily. Quantity:  60 Packet Refills:  0 CONTINUE these medications which have NOT CHANGED Dose & Instructions Dispensing Information Comments Morning Noon Evening Bedtime  
 amiodarone 200 mg tablet Commonly known as:  CORDARONE Your last dose was: Your next dose is:    
   
   
 Dose:  200 mg Take 1 Tab by mouth daily. Indications: VENTRICULAR RATE CONTROL IN ATRIAL FIBRILLATION Quantity:  60 Tab Refills:  0  
     
   
   
   
  
 ascorbic acid (vitamin C) 250 mg tablet Commonly known as:  VITAMIN C Your last dose was: Your next dose is:    
   
   
 Dose:  250 mg Take 1 Tab by mouth daily (with breakfast). Quantity:  90 Tab Refills:  2  
     
   
   
   
  
 aspirin 81 mg chewable tablet Your last dose was: Your next dose is:    
   
   
 Dose:  81 mg Take 1 Tab by mouth daily (with breakfast). Quantity:  90 Tab Refills:  3  
     
   
   
   
  
 atorvastatin 40 mg tablet Commonly known as:  LIPITOR Your last dose was: Your next dose is:    
   
   
 Dose:  40 mg Take 1 Tab by mouth nightly. Indications: DYSLIPIDEMIA Quantity:  90 Tab Refills:  3 cholecalciferol 1,000 unit tablet Commonly known as:  VITAMIN D3 Your last dose was: Your next dose is:    
   
   
 Dose:  1000 Units Take 1 Tab by mouth daily. Quantity:  90 Tab Refills:  3  
     
   
   
   
  
 cyanocobalamin 1,000 mcg tablet Commonly known as:  VITAMIN B-12 Your last dose was: Your next dose is:    
   
   
 Dose:  1000 mcg Take 1 Tab by mouth daily. Quantity:  90 Tab Refills:  3  
     
   
   
   
  
 docusate sodium 100 mg capsule Commonly known as:  Pee Stai Your last dose was: Your next dose is:    
   
   
 Dose:  100 mg Take 1 Cap by mouth daily for 90 days. Quantity:  30 Cap Refills:  0  
     
   
   
   
  
 ferrous sulfate 325 mg (65 mg iron) tablet Your last dose was: Your next dose is:    
   
   
 Dose:  325 mg Take 1 Tab by mouth daily (with breakfast). Quantity:  90 Tab Refills:  3 furosemide 20 mg tablet Commonly known as:  LASIX Your last dose was: Your next dose is:    
   
   
 1 tablet daily Quantity:  30 Tab Refills:  0  
     
   
   
   
  
 gabapentin 300 mg capsule Commonly known as:  NEURONTIN Your last dose was: Your next dose is:    
   
   
 Dose:  300 mg Take 1 Cap by mouth every eight (8) hours. Indications: NEUROPATHIC PAIN Quantity:  45 Cap Refills:  0  
     
   
   
   
  
 lactobacillus sp. 50 billion cpu 50 billion cell -375 mg Cap capsule Commonly known as:  BIO-K PLUS Your last dose was: Your next dose is:    
   
   
 Dose:  1 Cap Take 1 Cap by mouth daily. Quantity:  15 Cap Refills:  0  
     
   
   
   
  
 losartan 50 mg tablet Commonly known as:  COZAAR Your last dose was: Your next dose is:    
   
   
 Dose:  50 mg Take 1 Tab by mouth daily. Indications: Chronic Heart Failure, hypertension Quantity:  15 Tab Refills:  0  
     
   
   
   
  
 magnesium oxide 400 mg tablet Commonly known as:  MAG-OX Your last dose was: Your next dose is:    
   
   
 Dose:  400 mg Take 1 Tab by mouth daily. Quantity:  15 Tab Refills:  0  
     
   
   
   
  
 polyethylene glycol 17 gram packet Commonly known as:  Checo Freud Your last dose was: Your next dose is:    
   
   
 Dose:  17 g Take 1 Packet by mouth daily. Quantity:  30 Packet Refills:  0  
     
   
   
   
  
 zinc sulfate 220 (50) mg capsule Commonly known as:  ZINCATE Your last dose was: Your next dose is:    
   
   
 Dose:  220 mg Take 1 Cap by mouth daily. Quantity:  90 Cap Refills:  0 STOP taking these medications ceFAZolin in dextrose (iso-os) 2 gram/50 mL Pgbk  
   
  
 collagenase 250 unit/gram ointment Commonly known as:  SANTYL  
   
  
 dilTIAZem 30 mg tablet Commonly known as:  CARDIZEM  
   
  
 DULoxetine 60 mg capsule Commonly known as:  CYMBALTA  
   
  
 oxyCODONE-acetaminophen  mg per tablet Commonly known as:  PERCOCET 10 Where to Get Your Medications Information on where to get these meds will be given to you by the nurse or doctor. ! Ask your nurse or doctor about these medications  
  ampicillin-sulbactam 3 gram 3 g IVPB  
 metoprolol tartrate 25 mg tablet  
 potassium chloride 20 mEq packet  
 vancomycin 1,000 mg injection  
 warfarin 5 mg tablet

## 2017-08-18 ENCOUNTER — ANESTHESIA EVENT (OUTPATIENT)
Dept: CARDIOTHORACIC SURGERY | Age: 57
DRG: 239 | End: 2017-08-18
Payer: COMMERCIAL

## 2017-08-18 ENCOUNTER — ANESTHESIA (OUTPATIENT)
Dept: CARDIOTHORACIC SURGERY | Age: 57
DRG: 239 | End: 2017-08-18
Payer: COMMERCIAL

## 2017-08-18 PROBLEM — I73.9 PAD (PERIPHERAL ARTERY DISEASE) (HCC): Status: ACTIVE | Noted: 2017-08-18

## 2017-08-18 PROBLEM — T82.7XXA INFECTED PROSTHETIC VASCULAR GRAFT (HCC): Status: ACTIVE | Noted: 2017-08-18

## 2017-08-18 LAB
ACT BLD: 208 SECS (ref 79–138)
ARTERIAL PATENCY WRIST A: NO
BASE DEFICIT BLD-SCNC: 2 MMOL/L
BDY SITE: ABNORMAL
BODY TEMPERATURE: 37
CA-I BLD-MCNC: 1.48 MMOL/L (ref 1.12–1.32)
GAS FLOW.O2 O2 DELIVERY SYS: ABNORMAL L/MIN
GLUCOSE BLD STRIP.AUTO-MCNC: 100 MG/DL (ref 74–106)
HCO3 BLD-SCNC: 23.1 MMOL/L (ref 22–26)
HCT VFR BLD CALC: 26 % (ref 36–49)
HGB BLD-MCNC: 8.8 G/DL (ref 12–16)
O2/TOTAL GAS SETTING VFR VENT: 0 %
PCO2 BLD: 38.3 MMHG (ref 35–45)
PH BLD: 7.39 [PH] (ref 7.35–7.45)
PO2 BLD: 252 MMHG (ref 80–100)
POTASSIUM BLD-SCNC: 4.4 MMOL/L (ref 3.5–5.5)
SAO2 % BLD: 100 % (ref 92–97)
SERVICE CMNT-IMP: ABNORMAL
SODIUM BLD-SCNC: 124 MMOL/L (ref 136–145)
SPECIMEN TYPE: ABNORMAL
TOTAL RESP. RATE, ITRR: 0

## 2017-08-18 PROCEDURE — 77030010507 HC ADH SKN DERMBND J&J -B: Performed by: SURGERY

## 2017-08-18 PROCEDURE — C1768 GRAFT, VASCULAR: HCPCS | Performed by: SURGERY

## 2017-08-18 PROCEDURE — 77030008467 HC STPLR SKN COVD -B: Performed by: SURGERY

## 2017-08-18 PROCEDURE — 74011250637 HC RX REV CODE- 250/637

## 2017-08-18 PROCEDURE — 86920 COMPATIBILITY TEST SPIN: CPT | Performed by: SURGERY

## 2017-08-18 PROCEDURE — 74011250636 HC RX REV CODE- 250/636: Performed by: SURGERY

## 2017-08-18 PROCEDURE — 77030020782 HC GWN BAIR PAWS FLX 3M -B: Performed by: SURGERY

## 2017-08-18 PROCEDURE — 82947 ASSAY GLUCOSE BLOOD QUANT: CPT

## 2017-08-18 PROCEDURE — 76010000110 HC CV SURG 3 TO 3.5 HR: Performed by: SURGERY

## 2017-08-18 PROCEDURE — 77030013079 HC BLNKT BAIR HGGR 3M -A: Performed by: ANESTHESIOLOGY

## 2017-08-18 PROCEDURE — 77030011640 HC PAD GRND REM COVD -A: Performed by: SURGERY

## 2017-08-18 PROCEDURE — 77030013797 HC KT TRNSDUC PRSSR EDWD -A: Performed by: ANESTHESIOLOGY

## 2017-08-18 PROCEDURE — 04PY0JZ REMOVAL OF SYNTHETIC SUBSTITUTE FROM LOWER ARTERY, OPEN APPROACH: ICD-10-PCS | Performed by: SURGERY

## 2017-08-18 PROCEDURE — 74011000250 HC RX REV CODE- 250: Performed by: SURGERY

## 2017-08-18 PROCEDURE — 77030002996 HC SUT SLK J&J -A: Performed by: ANESTHESIOLOGY

## 2017-08-18 PROCEDURE — 77030026918 HC ADMN ST IV BLD BD -A: Performed by: ANESTHESIOLOGY

## 2017-08-18 PROCEDURE — 88311 DECALCIFY TISSUE: CPT | Performed by: SURGERY

## 2017-08-18 PROCEDURE — P9045 ALBUMIN (HUMAN), 5%, 250 ML: HCPCS

## 2017-08-18 PROCEDURE — 041K0JH BYPASS RIGHT FEMORAL ARTERY TO RIGHT FEMORAL ARTERY WITH SYNTHETIC SUBSTITUTE, OPEN APPROACH: ICD-10-PCS | Performed by: SURGERY

## 2017-08-18 PROCEDURE — 87075 CULTR BACTERIA EXCEPT BLOOD: CPT | Performed by: SURGERY

## 2017-08-18 PROCEDURE — 87102 FUNGUS ISOLATION CULTURE: CPT | Performed by: SURGERY

## 2017-08-18 PROCEDURE — 74011250636 HC RX REV CODE- 250/636

## 2017-08-18 PROCEDURE — 85347 COAGULATION TIME ACTIVATED: CPT

## 2017-08-18 PROCEDURE — 77030008544 HC TBNG MON PRSS MRTM -B: Performed by: ANESTHESIOLOGY

## 2017-08-18 PROCEDURE — P9016 RBC LEUKOCYTES REDUCED: HCPCS | Performed by: SURGERY

## 2017-08-18 PROCEDURE — 74011250637 HC RX REV CODE- 250/637: Performed by: SURGERY

## 2017-08-18 PROCEDURE — 77030005401 HC CATH RAD ARRO -A: Performed by: ANESTHESIOLOGY

## 2017-08-18 PROCEDURE — 77030019908 HC STETH ESOPH SIMS -A: Performed by: ANESTHESIOLOGY

## 2017-08-18 PROCEDURE — 77030006835 HC BLD SAW SAG STRY -B: Performed by: SURGERY

## 2017-08-18 PROCEDURE — 77030010505 HC ADH BIOGLU CRYO -F: Performed by: SURGERY

## 2017-08-18 PROCEDURE — 77030018719 HC DRSG PTCH ANTIMIC J&J -A: Performed by: ANESTHESIOLOGY

## 2017-08-18 PROCEDURE — 82803 BLOOD GASES ANY COMBINATION: CPT

## 2017-08-18 PROCEDURE — 30233N1 TRANSFUSION OF NONAUTOLOGOUS RED BLOOD CELLS INTO PERIPHERAL VEIN, PERCUTANEOUS APPROACH: ICD-10-PCS | Performed by: SURGERY

## 2017-08-18 PROCEDURE — 76060000037 HC ANESTHESIA 3 TO 3.5 HR: Performed by: SURGERY

## 2017-08-18 PROCEDURE — 77030019934 HC DRSG VAC ASST KCON -B: Performed by: SURGERY

## 2017-08-18 PROCEDURE — 77030020061 HC IV BLD WRMR ADMIN SET 3M -B: Performed by: ANESTHESIOLOGY

## 2017-08-18 PROCEDURE — 0Y6D0Z3 DETACHMENT AT LEFT UPPER LEG, LOW, OPEN APPROACH: ICD-10-PCS | Performed by: SURGERY

## 2017-08-18 PROCEDURE — 88307 TISSUE EXAM BY PATHOLOGIST: CPT | Performed by: SURGERY

## 2017-08-18 PROCEDURE — 77030018717 HC DRSG GRNUFM KCON -B: Performed by: SURGERY

## 2017-08-18 PROCEDURE — 77030012012 HC AIRWY OP SFT TELE -A: Performed by: ANESTHESIOLOGY

## 2017-08-18 PROCEDURE — 03HY32Z INSERTION OF MONITORING DEVICE INTO UPPER ARTERY, PERCUTANEOUS APPROACH: ICD-10-PCS | Performed by: ANESTHESIOLOGY

## 2017-08-18 PROCEDURE — 74011000250 HC RX REV CODE- 250

## 2017-08-18 PROCEDURE — 77030018836 HC SOL IRR NACL ICUM -A: Performed by: SURGERY

## 2017-08-18 PROCEDURE — 65620000000 HC RM CCU GENERAL

## 2017-08-18 PROCEDURE — 36600 WITHDRAWAL OF ARTERIAL BLOOD: CPT

## 2017-08-18 PROCEDURE — 87070 CULTURE OTHR SPECIMN AEROBIC: CPT | Performed by: SURGERY

## 2017-08-18 PROCEDURE — 77030008683 HC TU ET CUF COVD -A: Performed by: ANESTHESIOLOGY

## 2017-08-18 PROCEDURE — 86900 BLOOD TYPING SEROLOGIC ABO: CPT | Performed by: SURGERY

## 2017-08-18 PROCEDURE — 77030002986 HC SUT PROL J&J -A: Performed by: SURGERY

## 2017-08-18 PROCEDURE — 77030002924 HC SUT GORTX WLGO -B: Performed by: SURGERY

## 2017-08-18 PROCEDURE — 77030008500 HC TBNG CONN PRSS BD -A: Performed by: ANESTHESIOLOGY

## 2017-08-18 PROCEDURE — 77030019952 HC CANSTR VAC ASST KCON -B: Performed by: SURGERY

## 2017-08-18 PROCEDURE — 77030031139 HC SUT VCRL2 J&J -A: Performed by: SURGERY

## 2017-08-18 DEVICE — PROPATEN VASCULAR GRAFT TW RR 8MMX80CM 70CM RINGS HEPARIN
Type: IMPLANTABLE DEVICE | Site: CHEST  WALL | Status: FUNCTIONAL
Brand: GORE PROPATEN VASCULAR GRAFT

## 2017-08-18 RX ORDER — GUAIFENESIN 100 MG/5ML
81 LIQUID (ML) ORAL
Status: DISCONTINUED | OUTPATIENT
Start: 2017-08-19 | End: 2017-08-25 | Stop reason: HOSPADM

## 2017-08-18 RX ORDER — POLYETHYLENE GLYCOL 3350 17 G/17G
17 POWDER, FOR SOLUTION ORAL DAILY
Status: DISCONTINUED | OUTPATIENT
Start: 2017-08-19 | End: 2017-08-25 | Stop reason: HOSPADM

## 2017-08-18 RX ORDER — SODIUM CHLORIDE 9 MG/ML
INJECTION, SOLUTION INTRAVENOUS
Status: DISCONTINUED | OUTPATIENT
Start: 2017-08-18 | End: 2017-08-18 | Stop reason: HOSPADM

## 2017-08-18 RX ORDER — DULOXETIN HYDROCHLORIDE 30 MG/1
60 CAPSULE, DELAYED RELEASE ORAL DAILY
Status: DISCONTINUED | OUTPATIENT
Start: 2017-08-19 | End: 2017-08-19

## 2017-08-18 RX ORDER — NALOXONE HYDROCHLORIDE 0.4 MG/ML
0.4 INJECTION, SOLUTION INTRAMUSCULAR; INTRAVENOUS; SUBCUTANEOUS AS NEEDED
Status: DISCONTINUED | OUTPATIENT
Start: 2017-08-18 | End: 2017-08-25 | Stop reason: HOSPADM

## 2017-08-18 RX ORDER — DOCUSATE SODIUM 100 MG/1
100 CAPSULE, LIQUID FILLED ORAL DAILY
Status: DISCONTINUED | OUTPATIENT
Start: 2017-08-19 | End: 2017-08-25 | Stop reason: HOSPADM

## 2017-08-18 RX ORDER — DILTIAZEM HYDROCHLORIDE 30 MG/1
30 TABLET, FILM COATED ORAL
Status: DISCONTINUED | OUTPATIENT
Start: 2017-08-18 | End: 2017-08-20

## 2017-08-18 RX ORDER — CEFAZOLIN SODIUM 2 G/50ML
2 SOLUTION INTRAVENOUS EVERY 8 HOURS
Status: DISCONTINUED | OUTPATIENT
Start: 2017-08-18 | End: 2017-08-19

## 2017-08-18 RX ORDER — SODIUM CHLORIDE 9 MG/ML
75 INJECTION, SOLUTION INTRAVENOUS CONTINUOUS
Status: DISCONTINUED | OUTPATIENT
Start: 2017-08-18 | End: 2017-08-20

## 2017-08-18 RX ORDER — HEPARIN SODIUM 5000 [USP'U]/ML
5000 INJECTION, SOLUTION INTRAVENOUS; SUBCUTANEOUS EVERY 8 HOURS
Status: DISCONTINUED | OUTPATIENT
Start: 2017-08-18 | End: 2017-08-25 | Stop reason: HOSPADM

## 2017-08-18 RX ORDER — AMIODARONE HYDROCHLORIDE 200 MG/1
200 TABLET ORAL DAILY
Status: DISCONTINUED | OUTPATIENT
Start: 2017-08-19 | End: 2017-08-20

## 2017-08-18 RX ORDER — FENTANYL CITRATE 50 UG/ML
INJECTION, SOLUTION INTRAMUSCULAR; INTRAVENOUS AS NEEDED
Status: DISCONTINUED | OUTPATIENT
Start: 2017-08-18 | End: 2017-08-18 | Stop reason: HOSPADM

## 2017-08-18 RX ORDER — LOSARTAN POTASSIUM 50 MG/1
50 TABLET ORAL DAILY
Status: DISCONTINUED | OUTPATIENT
Start: 2017-08-19 | End: 2017-08-25 | Stop reason: HOSPADM

## 2017-08-18 RX ORDER — CALCIUM CHLORIDE INJECTION 100 MG/ML
INJECTION, SOLUTION INTRAVENOUS AS NEEDED
Status: DISCONTINUED | OUTPATIENT
Start: 2017-08-18 | End: 2017-08-18 | Stop reason: HOSPADM

## 2017-08-18 RX ORDER — ETOMIDATE 2 MG/ML
INJECTION INTRAVENOUS AS NEEDED
Status: DISCONTINUED | OUTPATIENT
Start: 2017-08-18 | End: 2017-08-18 | Stop reason: HOSPADM

## 2017-08-18 RX ORDER — MELATONIN
1000 DAILY
Status: DISCONTINUED | OUTPATIENT
Start: 2017-08-19 | End: 2017-08-25 | Stop reason: HOSPADM

## 2017-08-18 RX ORDER — HYDROMORPHONE HYDROCHLORIDE 1 MG/ML
0.5 INJECTION, SOLUTION INTRAMUSCULAR; INTRAVENOUS; SUBCUTANEOUS
Status: DISCONTINUED | OUTPATIENT
Start: 2017-08-18 | End: 2017-08-20

## 2017-08-18 RX ORDER — CEFAZOLIN SODIUM 2 G/50ML
2 SOLUTION INTRAVENOUS ONCE
Status: COMPLETED | OUTPATIENT
Start: 2017-08-18 | End: 2017-08-18

## 2017-08-18 RX ORDER — ONDANSETRON 2 MG/ML
4 INJECTION INTRAMUSCULAR; INTRAVENOUS
Status: DISCONTINUED | OUTPATIENT
Start: 2017-08-18 | End: 2017-08-25 | Stop reason: HOSPADM

## 2017-08-18 RX ORDER — LANOLIN ALCOHOL/MO/W.PET/CERES
400 CREAM (GRAM) TOPICAL DAILY
Status: DISCONTINUED | OUTPATIENT
Start: 2017-08-19 | End: 2017-08-25 | Stop reason: HOSPADM

## 2017-08-18 RX ORDER — SODIUM CHLORIDE 0.9 % (FLUSH) 0.9 %
5-10 SYRINGE (ML) INJECTION EVERY 8 HOURS
Status: DISCONTINUED | OUTPATIENT
Start: 2017-08-18 | End: 2017-08-25 | Stop reason: HOSPADM

## 2017-08-18 RX ORDER — GABAPENTIN 300 MG/1
300 CAPSULE ORAL EVERY 8 HOURS
Status: DISCONTINUED | OUTPATIENT
Start: 2017-08-18 | End: 2017-08-25 | Stop reason: HOSPADM

## 2017-08-18 RX ORDER — HEPARIN SODIUM 200 [USP'U]/100ML
INJECTION, SOLUTION INTRAVENOUS
Status: DISCONTINUED | OUTPATIENT
Start: 2017-08-18 | End: 2017-08-18 | Stop reason: HOSPADM

## 2017-08-18 RX ORDER — FUROSEMIDE 20 MG/1
20 TABLET ORAL DAILY
Status: DISCONTINUED | OUTPATIENT
Start: 2017-08-19 | End: 2017-08-25 | Stop reason: HOSPADM

## 2017-08-18 RX ORDER — LANOLIN ALCOHOL/MO/W.PET/CERES
325 CREAM (GRAM) TOPICAL
Status: DISCONTINUED | OUTPATIENT
Start: 2017-08-19 | End: 2017-08-25 | Stop reason: HOSPADM

## 2017-08-18 RX ORDER — ONDANSETRON 2 MG/ML
INJECTION INTRAMUSCULAR; INTRAVENOUS AS NEEDED
Status: DISCONTINUED | OUTPATIENT
Start: 2017-08-18 | End: 2017-08-18 | Stop reason: HOSPADM

## 2017-08-18 RX ORDER — ACETAMINOPHEN 650 MG/1
SUPPOSITORY RECTAL
Status: DISCONTINUED
Start: 2017-08-18 | End: 2017-08-18

## 2017-08-18 RX ORDER — ZINC SULFATE 50(220)MG
220 CAPSULE ORAL DAILY
Status: DISCONTINUED | OUTPATIENT
Start: 2017-08-19 | End: 2017-08-25 | Stop reason: HOSPADM

## 2017-08-18 RX ORDER — ASCORBIC ACID 250 MG
250 TABLET ORAL
Status: DISCONTINUED | OUTPATIENT
Start: 2017-08-19 | End: 2017-08-25 | Stop reason: HOSPADM

## 2017-08-18 RX ORDER — ACETAMINOPHEN 120 MG/1
SUPPOSITORY RECTAL AS NEEDED
Status: DISCONTINUED | OUTPATIENT
Start: 2017-08-18 | End: 2017-08-18 | Stop reason: HOSPADM

## 2017-08-18 RX ORDER — ATORVASTATIN CALCIUM 40 MG/1
40 TABLET, FILM COATED ORAL
Status: DISCONTINUED | OUTPATIENT
Start: 2017-08-18 | End: 2017-08-25 | Stop reason: HOSPADM

## 2017-08-18 RX ORDER — DIPHENHYDRAMINE HYDROCHLORIDE 50 MG/ML
12.5 INJECTION, SOLUTION INTRAMUSCULAR; INTRAVENOUS
Status: DISCONTINUED | OUTPATIENT
Start: 2017-08-18 | End: 2017-08-24

## 2017-08-18 RX ORDER — ALBUMIN HUMAN 50 G/1000ML
SOLUTION INTRAVENOUS
Status: DISCONTINUED | OUTPATIENT
Start: 2017-08-18 | End: 2017-08-18 | Stop reason: HOSPADM

## 2017-08-18 RX ORDER — SUCCINYLCHOLINE CHLORIDE 20 MG/ML
INJECTION INTRAMUSCULAR; INTRAVENOUS AS NEEDED
Status: DISCONTINUED | OUTPATIENT
Start: 2017-08-18 | End: 2017-08-18 | Stop reason: HOSPADM

## 2017-08-18 RX ORDER — FAMOTIDINE 20 MG/1
20 TABLET, FILM COATED ORAL ONCE
Status: COMPLETED | OUTPATIENT
Start: 2017-08-18 | End: 2017-08-18

## 2017-08-18 RX ORDER — MAGNESIUM SULFATE 1 G/100ML
INJECTION INTRAVENOUS AS NEEDED
Status: DISCONTINUED | OUTPATIENT
Start: 2017-08-18 | End: 2017-08-18 | Stop reason: HOSPADM

## 2017-08-18 RX ORDER — METOPROLOL TARTRATE 25 MG/1
12.5 TABLET, FILM COATED ORAL EVERY 12 HOURS
Status: DISCONTINUED | OUTPATIENT
Start: 2017-08-18 | End: 2017-08-22

## 2017-08-18 RX ORDER — SODIUM CHLORIDE, SODIUM LACTATE, POTASSIUM CHLORIDE, CALCIUM CHLORIDE 600; 310; 30; 20 MG/100ML; MG/100ML; MG/100ML; MG/100ML
125 INJECTION, SOLUTION INTRAVENOUS CONTINUOUS
Status: DISCONTINUED | OUTPATIENT
Start: 2017-08-18 | End: 2017-08-18 | Stop reason: HOSPADM

## 2017-08-18 RX ORDER — LIDOCAINE HYDROCHLORIDE 10 MG/ML
INJECTION, SOLUTION EPIDURAL; INFILTRATION; INTRACAUDAL; PERINEURAL AS NEEDED
Status: DISCONTINUED | OUTPATIENT
Start: 2017-08-18 | End: 2017-08-18 | Stop reason: HOSPADM

## 2017-08-18 RX ORDER — MIDAZOLAM HYDROCHLORIDE 1 MG/ML
INJECTION, SOLUTION INTRAMUSCULAR; INTRAVENOUS AS NEEDED
Status: DISCONTINUED | OUTPATIENT
Start: 2017-08-18 | End: 2017-08-18 | Stop reason: HOSPADM

## 2017-08-18 RX ORDER — ACETAMINOPHEN 325 MG/1
650 TABLET ORAL
Status: DISCONTINUED | OUTPATIENT
Start: 2017-08-18 | End: 2017-08-25 | Stop reason: HOSPADM

## 2017-08-18 RX ORDER — POTASSIUM CHLORIDE 1.5 G/1.77G
20 POWDER, FOR SOLUTION ORAL 2 TIMES DAILY WITH MEALS
Status: DISCONTINUED | OUTPATIENT
Start: 2017-08-18 | End: 2017-08-22

## 2017-08-18 RX ORDER — HEPARIN SODIUM 1000 [USP'U]/ML
INJECTION, SOLUTION INTRAVENOUS; SUBCUTANEOUS AS NEEDED
Status: DISCONTINUED | OUTPATIENT
Start: 2017-08-18 | End: 2017-08-18 | Stop reason: HOSPADM

## 2017-08-18 RX ORDER — LANOLIN ALCOHOL/MO/W.PET/CERES
1000 CREAM (GRAM) TOPICAL DAILY
Status: DISCONTINUED | OUTPATIENT
Start: 2017-08-19 | End: 2017-08-25 | Stop reason: HOSPADM

## 2017-08-18 RX ORDER — PROPOFOL 10 MG/ML
INJECTION, EMULSION INTRAVENOUS AS NEEDED
Status: DISCONTINUED | OUTPATIENT
Start: 2017-08-18 | End: 2017-08-18 | Stop reason: HOSPADM

## 2017-08-18 RX ORDER — ADHESIVE BANDAGE
30 BANDAGE TOPICAL DAILY PRN
Status: DISCONTINUED | OUTPATIENT
Start: 2017-08-18 | End: 2017-08-25 | Stop reason: HOSPADM

## 2017-08-18 RX ORDER — SODIUM CHLORIDE 0.9 % (FLUSH) 0.9 %
5-10 SYRINGE (ML) INJECTION AS NEEDED
Status: DISCONTINUED | OUTPATIENT
Start: 2017-08-18 | End: 2017-08-25 | Stop reason: HOSPADM

## 2017-08-18 RX ORDER — HYDROMORPHONE HYDROCHLORIDE 2 MG/ML
INJECTION, SOLUTION INTRAMUSCULAR; INTRAVENOUS; SUBCUTANEOUS AS NEEDED
Status: DISCONTINUED | OUTPATIENT
Start: 2017-08-18 | End: 2017-08-18 | Stop reason: HOSPADM

## 2017-08-18 RX ADMIN — DILTIAZEM HYDROCHLORIDE 30 MG: 30 TABLET, FILM COATED ORAL at 17:41

## 2017-08-18 RX ADMIN — ALBUMIN HUMAN 500 ML/HR: 50 SOLUTION INTRAVENOUS at 09:00

## 2017-08-18 RX ADMIN — HYDROMORPHONE HYDROCHLORIDE 2 MG: 2 INJECTION, SOLUTION INTRAMUSCULAR; INTRAVENOUS; SUBCUTANEOUS at 08:45

## 2017-08-18 RX ADMIN — CEFAZOLIN SODIUM 2 G: 2 SOLUTION INTRAVENOUS at 08:16

## 2017-08-18 RX ADMIN — SODIUM CHLORIDE: 9 INJECTION, SOLUTION INTRAVENOUS at 10:30

## 2017-08-18 RX ADMIN — HEPARIN SODIUM 6000 UNITS: 1000 INJECTION, SOLUTION INTRAVENOUS; SUBCUTANEOUS at 10:14

## 2017-08-18 RX ADMIN — ATORVASTATIN CALCIUM 40 MG: 40 TABLET, FILM COATED ORAL at 20:45

## 2017-08-18 RX ADMIN — ONDANSETRON 4 MG: 2 INJECTION INTRAMUSCULAR; INTRAVENOUS at 17:41

## 2017-08-18 RX ADMIN — MIDAZOLAM HYDROCHLORIDE 2 MG: 1 INJECTION, SOLUTION INTRAMUSCULAR; INTRAVENOUS at 08:16

## 2017-08-18 RX ADMIN — FENTANYL CITRATE 100 MCG: 50 INJECTION, SOLUTION INTRAMUSCULAR; INTRAVENOUS at 09:06

## 2017-08-18 RX ADMIN — FENTANYL CITRATE 200 MCG: 50 INJECTION, SOLUTION INTRAMUSCULAR; INTRAVENOUS at 08:58

## 2017-08-18 RX ADMIN — PROPOFOL 20 MG: 10 INJECTION, EMULSION INTRAVENOUS at 10:12

## 2017-08-18 RX ADMIN — CALCIUM CHLORIDE INJECTION 500 MG: 100 INJECTION, SOLUTION INTRAVENOUS at 09:25

## 2017-08-18 RX ADMIN — PROPOFOL 20 MG: 10 INJECTION, EMULSION INTRAVENOUS at 08:24

## 2017-08-18 RX ADMIN — ACETAMINOPHEN 650 MG: 120 SUPPOSITORY RECTAL at 09:52

## 2017-08-18 RX ADMIN — POTASSIUM CHLORIDE 20 MEQ: 1.5 POWDER, FOR SOLUTION ORAL at 17:41

## 2017-08-18 RX ADMIN — ETOMIDATE 14 MG: 2 INJECTION INTRAVENOUS at 08:29

## 2017-08-18 RX ADMIN — PROPOFOL 20 MG: 10 INJECTION, EMULSION INTRAVENOUS at 09:16

## 2017-08-18 RX ADMIN — FAMOTIDINE 20 MG: 20 TABLET ORAL at 07:30

## 2017-08-18 RX ADMIN — CALCIUM CHLORIDE INJECTION 500 MG: 100 INJECTION, SOLUTION INTRAVENOUS at 10:15

## 2017-08-18 RX ADMIN — MAGNESIUM SULFATE 1 G: 1 INJECTION INTRAVENOUS at 09:18

## 2017-08-18 RX ADMIN — Medication 10 ML: at 15:26

## 2017-08-18 RX ADMIN — HYDROMORPHONE HYDROCHLORIDE 0.5 MG: 1 INJECTION, SOLUTION INTRAMUSCULAR; INTRAVENOUS; SUBCUTANEOUS at 20:45

## 2017-08-18 RX ADMIN — PROPOFOL 20 MG: 10 INJECTION, EMULSION INTRAVENOUS at 08:29

## 2017-08-18 RX ADMIN — Medication 10 ML: at 22:01

## 2017-08-18 RX ADMIN — DILTIAZEM HYDROCHLORIDE 30 MG: 30 TABLET, FILM COATED ORAL at 22:01

## 2017-08-18 RX ADMIN — CEFAZOLIN SODIUM 2 G: 2 SOLUTION INTRAVENOUS at 15:23

## 2017-08-18 RX ADMIN — GABAPENTIN 300 MG: 300 CAPSULE ORAL at 15:23

## 2017-08-18 RX ADMIN — PROPOFOL 20 MG: 10 INJECTION, EMULSION INTRAVENOUS at 11:08

## 2017-08-18 RX ADMIN — FENTANYL CITRATE 100 MCG: 50 INJECTION, SOLUTION INTRAMUSCULAR; INTRAVENOUS at 08:44

## 2017-08-18 RX ADMIN — ACETAMINOPHEN: 650 SUPPOSITORY RECTAL at 10:00

## 2017-08-18 RX ADMIN — SODIUM CHLORIDE 75 ML/HR: 900 INJECTION, SOLUTION INTRAVENOUS at 12:40

## 2017-08-18 RX ADMIN — ONDANSETRON 4 MG: 2 INJECTION INTRAMUSCULAR; INTRAVENOUS at 09:00

## 2017-08-18 RX ADMIN — FENTANYL CITRATE 100 MCG: 50 INJECTION, SOLUTION INTRAMUSCULAR; INTRAVENOUS at 08:29

## 2017-08-18 RX ADMIN — SUCCINYLCHOLINE CHLORIDE 100 MG: 20 INJECTION INTRAMUSCULAR; INTRAVENOUS at 08:33

## 2017-08-18 RX ADMIN — PROPOFOL 20 MG: 10 INJECTION, EMULSION INTRAVENOUS at 08:33

## 2017-08-18 RX ADMIN — HYDROMORPHONE HYDROCHLORIDE 0.5 MG: 1 INJECTION, SOLUTION INTRAMUSCULAR; INTRAVENOUS; SUBCUTANEOUS at 17:41

## 2017-08-18 RX ADMIN — FENTANYL CITRATE 50 MCG: 50 INJECTION, SOLUTION INTRAMUSCULAR; INTRAVENOUS at 11:10

## 2017-08-18 RX ADMIN — FENTANYL CITRATE 50 MCG: 50 INJECTION, SOLUTION INTRAMUSCULAR; INTRAVENOUS at 09:15

## 2017-08-18 RX ADMIN — METOPROLOL TARTRATE 12.5 MG: 25 TABLET ORAL at 20:45

## 2017-08-18 RX ADMIN — METOPROLOL TARTRATE 12.5 MG: 25 TABLET ORAL at 12:05

## 2017-08-18 RX ADMIN — GABAPENTIN 300 MG: 300 CAPSULE ORAL at 22:00

## 2017-08-18 RX ADMIN — HEPARIN SODIUM 5000 UNITS: 5000 INJECTION, SOLUTION INTRAVENOUS; SUBCUTANEOUS at 20:45

## 2017-08-18 RX ADMIN — SODIUM CHLORIDE, SODIUM LACTATE, POTASSIUM CHLORIDE, AND CALCIUM CHLORIDE 125 ML/HR: 600; 310; 30; 20 INJECTION, SOLUTION INTRAVENOUS at 07:37

## 2017-08-18 RX ADMIN — HEPARIN SODIUM 5000 UNITS: 5000 INJECTION, SOLUTION INTRAVENOUS; SUBCUTANEOUS at 15:24

## 2017-08-18 NOTE — PROGRESS NOTES
Bedside shift change report given to Susi RN (oncoming nurse) by Isai Dunn (offgoing nurse). Report included the following information SBAR, Kardex, ED Summary, OR Summary, Procedure Summary, Intake/Output, MAR and Recent Results.

## 2017-08-18 NOTE — ANESTHESIA PREPROCEDURE EVALUATION
Anesthetic History   No history of anesthetic complications       Comments:   Risk Factors for Postoperative nausea/vomiting:       History of postoperative nausea/vomiting? NO       Female? NO       Motion sickness? NO       Intended opioid administration for postoperative analgesia? YES      Smoking Abstinence  Current Smoker? NO  Elective Surgery? YES  Seen preoperatively by anesthesiologist or proxy prior to day of surgery? YES  Pt abstained from smoking 24 hours prior to anesthesia?  YES     Review of Systems / Medical History  Patient summary reviewed and pertinent labs reviewed    Pulmonary                Comments: Ex smoker    Neuro/Psych   Within defined limits           Cardiovascular          CHF: NYHA Classification II, dyspnea on exertion  Dysrhythmias   Past MI, CAD, PAD, cardiac stents and hyperlipidemia    Exercise tolerance: <4 METS     GI/Hepatic/Renal                Endo/Other      Hypothyroidism: well controlled  Arthritis and anemia     Other Findings            Physical Exam    Airway  Mallampati: II  TM Distance: > 6 cm  Neck ROM: normal range of motion   Mouth opening: Normal     Cardiovascular    Rhythm: regular  Rate: abnormal         Dental    Dentition: Edentulous     Pulmonary    Rhonchi:bilateral             Abdominal  GI exam deferred       Other Findings            Anesthetic Plan    ASA: 4  Anesthesia type: general    Monitoring Plan: Arterial line and BIS      Induction: Intravenous  Anesthetic plan and risks discussed with: Patient

## 2017-08-18 NOTE — H&P (VIEW-ONLY)
Vascular Surgery H&P      Patient: Pamela Castillo MRN: [id number]  [facility]: [id number]      YOB: 1960    Age: 59 y.o. Sex: male      DOA: 7/24/2017       HPI:     Pamela Castillo is a 59 y.o. male with MMP and complicated vascular history. He was admitted to [facility] over the weekend for severe sepsis. He states that Saturday he woke and was unable to breath and went to ED. CTA scan was negative for PE. ECHO showed severe right heart dilatation with reduced right ventricular global systolic function. EF 50%. He was placed on pressors and diuresed with good improvement. His WBCs were noted to be elevated at 27.7K and he was started on IV ABX. Reportedly blood cultures were positive however I am unable to review these results. His WBCs have improved to 12K today. He does have exposed graft in the left groin with purulent drainage. He denies any pain. He is currently off pressors and doing much better. He was transferred to [facility] for further treatment per his vascular surgeon. He has history of ax-fem bypass with jump fem-fem bypass and right fem-pop bypass. Recently he developed abscess over his ax-fem bypass requiring I&D and repair of left CFA pseudoaneurysm. He unfortunately had blow out of his left femoral anastomosis and had left CFA ligation with jump bypass from right to left fem-fem bypass.      Past Medical History:   Diagnosis Date    Acute blood loss as cause of postoperative anemia 4/4/2017    Anticoagulated on Coumadin     Aortoiliac occlusive disease (HCC) 1/25/2017    Atherosclerosis of native artery of both lower extremities with intermittent claudication ([facility] [address] 1/25/2017    Benign hypertensive heart disease with systolic congestive heart failure, NYHA class 2 ([facility] [address] 1/26/2015    Carotid artery disease (HCC)     Chronic atrial fibrillation (HCC)     Chronic systolic heart failure (HCC)     Chronic ulcer of right foot ([facility] [address] 4/4/2017    Coronary artery disease involving native coronary artery of native heart 3/15/2017    Successful stenting of Cx (Xience LESLEY) and RCA (Xience LESLEY) to 0% by Dr. Loren Ryan on 3/15/17.     DDD (degenerative disc disease), lumbar 1/26/2015    Dyslipidemia     Erectile dysfunction 7/5/2016    Euthyroid sick syndrome 4/6/2017    Hereditary peripheral neuropathy 11/15/2016    History of cardioversion 4/18/2017    S/P Synchronized external cardioversion (4/18/2017 - Dr. Yasmine Montejo)    Hypertension     Hyperuricemia     Ischemic cardiomyopathy     Peripheral artery disease ([facility] [address] 1/25/2017    Spinal stenosis of lumbar region with radiculopathy 5/4/2015    Dr. Lore Lundborg Vitamin D deficiency 4/22/2017    Vitamin D 25-Hydroxy (4/22/2017) = 12.0       Past Surgical History:   Procedure Laterality Date    CARDIAC CATHETERIZATION  3/8/2017         CARDIAC CATHETERIZATION  3/15/2017         CORONARY STENT SINGLE W/PTCA  3/15/2017         HX COLONOSCOPY  07/23/2015    polyps repeat 2020 5 years Hector Henderson    HX FEMORAL BYPASS Right 04/04/2017    S/P Right axillary to bifemoral artery bypass using an 8 mm Propaten graft from the right axilla to the right common femoral artery with a jump femoral-femoral bypass with another 8 mm Propaten external ring bypass; Right common femoral artery, and profunda femoris artery and superficial femoral artery endarterectomy causing an extensive surgery on the right side (4/4/2017 - Dr. Saeid Diamond)    [address] Right 04/07/2017    S/P Cutdown of femoral-femoral bypass, more on the left groin side; Right lower extremity angiography with first-order catheterization (4/7/2017 - Dr. Ar Bello)    HX FEMORAL BYPASS Right 04/10/2017    S/P Right femoral to above-knee popliteal bypass with an 8 mm PTFE graft (4/10/2017 - Dr. Kaylyn Camilo)       Family History   Problem Relation Age of Onset    Heart Disease Father        Social History     Social History    Marital status: LEGALLY  Spouse name: N/A    Number of children: N/A    Years of education: N/A     Social History Main Topics    Smoking status: Former Smoker    Smokeless tobacco: Never Used      Comment: quit in 2011    Alcohol use 1.2 oz/week     2 Cans of beer per week      Comment: 10 cans of beer a month    Drug use: Yes     Special: Marijuana      Comment: occasionally    Sexual activity: Yes     Partners: Female     Other Topics Concern    Not on file     Social History Narrative       Prior to Admission medications    Medication Sig Start Date End Date Taking? Authorizing Provider   amiodarone (CORDARONE) 200 mg tablet 200 mg. 7/13/17  Yes Historical Provider   ascorbic acid, vitamin C, (VITAMIN C) 250 mg tablet 250 mg. 6/2/17  Yes Historical Provider   aspirin 81 mg chewable tablet 81 mg. 6/2/17  Yes Historical Provider   cholecalciferol (VITAMIN D3) 1,000 unit tablet 1,000 Units. 6/2/17  Yes Historical Provider   cyanocobalamin 1,000 mcg tablet 1,000 mcg. 10/6/16  Yes Historical Provider   DULoxetine (CYMBALTA) 60 mg capsule 60 mg. 6/2/17  Yes Historical Provider   ferrous sulfate 325 mg (65 mg iron) tablet 325 mg. 6/2/17  Yes Historical Provider   gabapentin (NEURONTIN) 100 mg capsule 100 mg. 6/19/17  Yes Historical Provider   dilTIAZem (CARDIZEM) 30 mg tablet 30 mg. Historical Provider   amiodarone (CORDARONE) 200 mg tablet Take 1 Tab by mouth daily. Indications: VENTRICULAR RATE CONTROL IN ATRIAL FIBRILLATION 7/13/17   Maritza Dixon MD   clopidogrel (PLAVIX) 75 mg tab Take 1 Tab by mouth daily. 7/13/17   Anthony Lehman MD   oxyCODONE-acetaminophen (PERCOCET) 5-325 mg per tablet Take 2 Tabs by mouth every four (4) hours as needed. Max Daily Amount: 12 Tabs. 7/10/17   JUNE Millan   gabapentin (NEURONTIN) 100 mg capsule Take 1 Cap by mouth two (2) times a day. Indications: NEUROPATHIC PAIN 6/19/17   Brad Mike MD   aspirin 81 mg chewable tablet Take 1 Tab by mouth daily (with breakfast). 6/2/17   Angel Brantley MD   cholecalciferol (VITAMIN D3) 1,000 unit tablet Take 1 Tab by mouth daily. 6/2/17   Angel Brantley MD   losartan (COZAAR) 25 mg tablet Take 1 Tab by mouth daily. Indications: Chronic Heart Failure, hypertension 6/2/17   Angel Brantley MD   ascorbic acid, vitamin C, (VITAMIN C) 250 mg tablet Take 1 Tab by mouth daily (with breakfast). 6/2/17   Angel Brantley MD   DULoxetine (CYMBALTA) 60 mg capsule Take 1 Cap by mouth daily. 6/2/17   Angel Brantley MD   zinc sulfate (ZINCATE) 220 (50) mg capsule Take 1 Cap by mouth daily. 6/2/17   Angel Brantley MD   ferrous sulfate 325 mg (65 mg iron) tablet Take 1 Tab by mouth daily (with breakfast). 6/2/17   Angel Brantley MD   atorvastatin (LIPITOR) 40 mg tablet Take 1 Tab by mouth nightly. Indications: DYSLIPIDEMIA 6/2/17   Angel Brantley MD   metoprolol tartrate (LOPRESSOR) 25 mg tablet Take 0.5 Tabs by mouth every twelve (12) hours. Indications: hypertension, VENTRICULAR RATE CONTROL IN ATRIAL FIBRILLATION 6/2/17   Mariza Covington NP   dilTIAZem (CARDIZEM) 30 mg tablet Take 1 Tab by mouth Before breakfast, lunch, dinner and at bedtime. Indications: hypertension 6/2/17   Mariza Covington NP   cyanocobalamin (VITAMIN B-12) 1,000 mcg tablet Take 1 Tab by mouth daily.  10/6/16   Angel Brantley MD       No Known Allergies    Review of Systems  Constitutional: positive for fatigue, diaphoresis (resolved)  HEENT: negative   Respiratory: positive for SOB (improved)  Cardiovascular: negative   Gastrointestinal: negative   Genitourinary:negative   Hematologic/lymphatic: negative   Musculoskeletal:positive for open left groin wound  Neurological: negative   Behavioral/Psych: negative   Endocrine: negative   Allergic/Immunologic: negative        Physical Exam:      Visit Vitals    /70    Pulse 73    Temp 98.8 °F (37.1 °C)    Resp 24    Ht 5' 10\" (1.778 m)    Wt 170 lb 10.2 oz (77.4 kg)    SpO2 99%    BMI 24.48 kg/m2       Physical Exam:  General: Well-appearing male in no acute distress   HEENT: EOMI, no scleral icterus is noted. Cardiovascular: RRR   Pulmonary: No increased work or breathing is noted. Abdomen:soft, NT, nondistended. No rebound or guarding is noted. Extremities: feet warm to touch, no BLE edema. Left thigh incision with minimal erythema, intact but does have some scant purulent drainage. Left groin incision dehisced with exposed graft, mild purulent drainage on dressing. Neuro: Cranial nerves II through XII are grossly intact     Data Review:    CBC:   Lab Results   Component Value Date/Time    WBC 14.1 07/13/2017 12:00 AM    RBC 3.34 07/13/2017 12:00 AM    HGB 9.7 07/13/2017 12:00 AM    HCT 30.0 07/13/2017 12:00 AM    PLATELET 310 86/09/5355 12:00 AM      BMP:   Lab Results   Component Value Date/Time    Glucose 95 07/08/2017 11:40 AM    Sodium 134 07/08/2017 11:40 AM    Potassium 4.5 07/08/2017 11:40 AM    Chloride 96 07/08/2017 11:40 AM    CO2 30 07/08/2017 11:40 AM    BUN 9 07/08/2017 11:40 AM    Creatinine 0.65 07/08/2017 11:40 AM    Calcium 9.0 07/08/2017 11:40 AM     Coagulation:   Lab Results   Component Value Date/Time    Prothrombin time 13.0 07/04/2017 11:00 AM    INR 1.0 07/04/2017 11:00 AM    aPTT 33.3 07/08/2017 11:40 AM         Assessment/Plan     58 yo male with MMP and complicated vascular history with multiple bypass procedures with PTFE graft. Patient now presents with exposed left groin graft and severe sepsis. WBCs improved with ABX. Continue broad spectrum ABX, cultures sent. Will consult ID. ECHO from [facility] [address] shows right heart failure. Patient with history of [address]. Cardiology consulted. Will likely need removal of infected PTFE graft and possible bilateral AKA. Further surgical discussion in AM with MD. Keep NPO after midnight.      Active Problems:    Sepsis ([address] (7/24/2017)        Pelon Davidson  July 24, 2017

## 2017-08-18 NOTE — OP NOTES
[address]    Name:  Ninoska Costello  MR#:  [id number]  :  1960  Account #:  [de-identified]  Date of Adm:  2017  Date of Surgery:  2017      ATTENDING: Pablo Zelaya MD.    PREOPERATIVE DIAGNOSIS: Ischemic rest pain of the left lower  extremity with infected right axillary femoral bypass. POSTOPERATIVE DIAGNOSIS: Ischemic rest pain of the left lower  extremity with infected right axillary femoral bypass. PROCEDURES PERFORMED  1. Left above-the-knee amputation. 2. Right axillary femoral jump bypass interposition. 3. Removal of infected right axillary femoral bypass. 4. Wound VAC placement of upper thigh 1.2 cm x 5.2 cm x 1.8 cm and  groin 4 cm x 0.5 cm x 0.2 cm. CULTURES: Right flank. SPECIMENS REMOVED: Left leg. DRAINS: NONE.    ESTIMATED BLOOD LOSS: 400 mL. ANESTHESIA: General.    INDICATIONS FOR PROCEDURE: The patient is a 78-year-old  gentleman with aortoiliac occlusive disease in a right axillary femoral  bypass. The patient has ischemic rest pain of the left lower extremity  with continued infection of the right axillary femoral bypass. The patient  was recommended for interposition jump bypass grafting with removal  of infected graft and above-the-knee amputation. The patient was  given risks and benefits of the procedure including but not limited to  bleeding, infection, damage to adjacent structures, MI, stroke, and  death, as well as loss of lower extremity, loss of bypass and need for  further surgery. The patient was understanding of all the risks and  underwent the procedure. DESCRIPTION OF PROCEDURE: The patient was correctly identified  in the preoperative holding area and taken to the operating room in  stable condition. The patient had a pre-incision time-out prior to  surgery. The patient was prepped and draped in normal sterile fashion  according to CDC guidelines aseptic technique.  The patient also had  preoperative antibiotics prior to any incision. We then were able to look  at the left lower extremity and create a fish-mouth incision in the  normal fashion. We dissected down around circumferentially and got to  the neurovascular bundle, cut this, and tied it off with 2 separate 0 silk  ties. We were able to cut the femur with a [employer] saw. We then were  able to remove the leg and sent as a specimen. We then copiously  irrigated the wound and gained hemostasis with electric Bovie cautery. We then closed the fascia with a 2-0 Vicryl suture, so a finger could not  get through, and then a stapler was used for skin staples. Xeroform, 4  x 4s, ABD, Kerlix, Ace bandage and then Coban were used for  wrapping. We replaced our wound VAC as appropriate with the above  measurements, we then were able to wrap the leg appropriately. Once  this was done, we then reprepped the patient for the bypass. We then  were able to make an incision closer to the axillary and then an incision  just above the femoral. We got the graft out. It was very well adherent  in these areas. We then were able to heparinize the patient  appropriately. We tunneled more medially and put another 8 x 80  externally ringed Propaten graft in place. We created an end-to-end  anastomosis on both sides. Two repair sutures were required on the  proximal side with BioGlue. On the distal side, no repair sutures were  required, but BioGlue was also employed. Once this was performed,  we then were able to copiously irrigate both wounds, closed with 3-0  Vicryl deep dermal layer, 4-0 Vicryl subcuticular layer and Dermabond  for dressing. We then were able to go into the midportion of the graft of  the areas with redness and purulence. On the more inferior portion,  there was a lot of purulence and the graft was not densely adherent. We were able to then culture that area and then removed the graft in  its entirety in between these 2 areas.  A second incision was placed at  the other area of redness, but this graft was more densely adherent,  but we were able to fully remove the graft out of both these areas. We  were at least 10 cm away from our previous incisions on either side. We then were able to copiously irrigate the wound. Electric Bovie  cautery was used for hemostasis. We did place 2 staples on the  inferior portion and 1 staple on the superior portion and then placed 4 x  4's on either side for packing. Once this was done, we then gently  woke the patient up and took him to the ICU in stable condition.         MD ALFONSO Mosley / Ming Carrasco  D:  08/18/2017   11:36  T:  08/18/2017   15:18  Job #:  [id number]

## 2017-08-18 NOTE — BRIEF OP NOTE
BRIEF OPERATIVE NOTE    Date of Procedure: 8/18/2017   Preoperative Diagnosis: infected graft and ischemic rest pain  Postoperative Diagnosis:  infected graft and ischemic rest pain    Procedure(s):  removal of right infected graft with jump bypass  AMPUTATION KNEE(AKA)-left leg  Surgeon(s) and Role:     * Mauro Vaz MD - Primary         Assistant Staff:       Surgical Staff:  Circ-1: Federica Bautista RN  Scrub Tech-1: Felicia Bernardo; Ross Santos. Knapp  Surg Asst-1: Kate Dietz  Event Time In   Incision Start 0902   Incision Close      Anesthesia: General   Estimated Blood Loss: 400mL  Specimens:   ID Type Source Tests Collected by Time Destination   1 : left leg AKA Fresh Leg, Left  Mauro Vaz MD 8/18/2017 7422 Pathology   1 : right flank wound Wound Chest CULTURE, ANAEROBIC, CULTURE, WOUND W GRAM STAIN, CULTURE, FUNGUS Mauro Vaz MD 8/18/2017 1112 Microbiology      Findings: none   Complications: none  Implants:   Implant Name Type Inv.  Item Serial No.  Lot No. LRB No. Used Action   GRAFT TW STRTCH RR [id number] -- PROPATEN - [id number]   GRAFT TW STRTCH RR [id number] -- [employer]EN [id number]  [employer]   Right 1 Implanted

## 2017-08-18 NOTE — INTERVAL H&P NOTE
H&P Update:  Dorothea Allen was seen and examined. History and physical has been reviewed. Significant clinical changes have occurred as noted:  Now with removal of infected graft in left groin and rest pain. Needs removal of presumed infected graft of right ax-fem with jump bypass and left AKA.     Signed By: Nader Noel MD     August 18, 2017 7:50 AM

## 2017-08-18 NOTE — PROGRESS NOTES
Patient going down for surgery will be readmitted to floor (hospital) by registration [facility] [place]). Witnessed by Tiana Haywood. Nursing supervisor informed.

## 2017-08-18 NOTE — PROGRESS NOTES
1205 - Pt received from OR. Pt had L AKA with R axillary-fem bypass with jump. Previous bypass graft in the R groin removed. Pt is still slightly sedated. Responds to voice and follows some commands. Pt has L AKA dressing from OR CDI. He also has a wound vac to his L groin. Pt has incision along the R lateral side of his chest that is packed and dressed. It is oozing blood. Dr Aggarwal Sheets aware and sts this is to be expected. Pt is hypertensive. Per Dr Tami Abebe, this is his baseline. He has a R radial A line in place. SBP in the 160s. HR 69. NSR. Pt is warm and a bit sweaty. His temp is 99.4 oral.    1600 - the pt is more arousable now. He is not complaining of pain at this time. His R chest dressign continues to ooze blood. Pedal pulses still present on doppler. 1745 - Pt given dilaudid and zofran for a pain scale 9/10.    1915 - shift report give to Ira Rosa RN.

## 2017-08-18 NOTE — ANESTHESIA PROCEDURE NOTES
Arterial Line Placement    Performed by: Hali Green by: [place]     Pre-Procedure  Indications:  Arterial pressure monitoring and blood sampling  Preanesthetic Checklist: patient identified, risks and benefits discussed, anesthesia consent, site marked, patient being monitored, timeout performed and patient being monitored      Procedure:   Prep:  Chlorhexidine  Seldinger Technique?: Yes    Orientation:  Left  Location:  Radial artery  Catheter size:  20 G  Number of attempts:  2  Cont Cardiac Output Sensor: No      Assessment:   Post-procedure:  Line secured and sterile dressing applied  Patient Tolerance:  Patient tolerated the procedure well with no immediate complications

## 2017-08-18 NOTE — ANESTHESIA POSTPROCEDURE EVALUATION
Post-Anesthesia Evaluation and Assessment    Patient: Meredith Knight MRN: [id number]  SSN: [id number]    YOB: 1960  Age: 59 y.o. Sex: male       Cardiovascular Function/Vital Signs  Visit Vitals    /71    Pulse 60    Temp 36.5 °C (97.7 °F)    Resp 28    Wt 69.2 kg (152 lb 9.6 oz)    SpO2 100%    BMI 21.9 kg/m2       Patient is status post general anesthesia for Procedure(s):  removal of right infected graft with jump bypass  AMPUTATION KNEE(AKA)-left leg. Nausea/Vomiting: None    Postoperative hydration reviewed and adequate. Pain:  Pain Scale 1: Numeric (0 - 10) (08/18/17 1754)  Pain Intensity 1: 6 (08/18/17 1754)   Managed    Neurological Status:   Neuro  Neurologic State: Alert (08/18/17 1600)  Orientation Level: Disoriented to person;Disoriented to place; Disoriented to situation;Disoriented to time (08/18/17 1600)  Cognition: Follows commands (08/18/17 1600)  RLE Motor Response: Weak (08/17/17 1900)   At baseline    Mental Status and Level of Consciousness: Arousable    Pulmonary Status:   O2 Device: Nasal cannula (08/18/17 1210)   Adequate oxygenation and airway patent    Complications related to anesthesia: None    Post-anesthesia assessment completed.  No concerns    Signed By: Swapnil Flores MD     August 18, 2017

## 2017-08-19 ENCOUNTER — APPOINTMENT (OUTPATIENT)
Dept: CT IMAGING | Age: 57
DRG: 239 | End: 2017-08-19
Attending: INTERNAL MEDICINE
Payer: COMMERCIAL

## 2017-08-19 ENCOUNTER — APPOINTMENT (OUTPATIENT)
Dept: GENERAL RADIOLOGY | Age: 57
DRG: 239 | End: 2017-08-19
Attending: INTERNAL MEDICINE
Payer: COMMERCIAL

## 2017-08-19 LAB
AMPHET UR QL SCN: NEGATIVE
ANION GAP SERPL CALC-SCNC: 7 MMOL/L (ref 3–18)
ANION GAP SERPL CALC-SCNC: 9 MMOL/L (ref 3–18)
APPEARANCE UR: CLEAR
APTT PPP: 40.7 SEC (ref 23–36.4)
BACTERIA URNS QL MICRO: ABNORMAL /HPF
BARBITURATES UR QL SCN: NEGATIVE
BASOPHILS # BLD: 0 K/UL (ref 0–0.06)
BASOPHILS NFR BLD: 0 % (ref 0–3)
BENZODIAZ UR QL: POSITIVE
BILIRUB UR QL: NEGATIVE
BUN SERPL-MCNC: 10 MG/DL (ref 7–18)
BUN SERPL-MCNC: 11 MG/DL (ref 7–18)
BUN/CREAT SERPL: 14 (ref 12–20)
BUN/CREAT SERPL: 17 (ref 12–20)
CA-I SERPL-SCNC: 1.18 MMOL/L (ref 1.12–1.32)
CALCIUM SERPL-MCNC: 8.5 MG/DL (ref 8.5–10.1)
CALCIUM SERPL-MCNC: 8.9 MG/DL (ref 8.5–10.1)
CANNABINOIDS UR QL SCN: POSITIVE
CHLORIDE SERPL-SCNC: 100 MMOL/L (ref 100–108)
CHLORIDE SERPL-SCNC: 99 MMOL/L (ref 100–108)
CK MB CFR SERPL CALC: 0.5 % (ref 0–4)
CK MB SERPL-MCNC: 4.3 NG/ML (ref 5–25)
CK SERPL-CCNC: 899 U/L (ref 39–308)
CO2 SERPL-SCNC: 21 MMOL/L (ref 21–32)
CO2 SERPL-SCNC: 25 MMOL/L (ref 21–32)
COCAINE UR QL SCN: NEGATIVE
COLOR UR: YELLOW
CREAT SERPL-MCNC: 0.6 MG/DL (ref 0.6–1.3)
CREAT SERPL-MCNC: 0.77 MG/DL (ref 0.6–1.3)
DIFFERENTIAL METHOD BLD: ABNORMAL
EOSINOPHIL # BLD: 0 K/UL (ref 0–0.4)
EOSINOPHIL NFR BLD: 0 % (ref 0–5)
EPITH CASTS URNS QL MICRO: ABNORMAL /LPF (ref 0–5)
ERYTHROCYTE [DISTWIDTH] IN BLOOD BY AUTOMATED COUNT: 17.6 % (ref 11.6–14.5)
ERYTHROCYTE [DISTWIDTH] IN BLOOD BY AUTOMATED COUNT: 17.8 % (ref 11.6–14.5)
ETHANOL SERPL-MCNC: <3 MG/DL (ref 0–3)
GLUCOSE SERPL-MCNC: 96 MG/DL (ref 74–99)
GLUCOSE SERPL-MCNC: 98 MG/DL (ref 74–99)
GLUCOSE UR STRIP.AUTO-MCNC: NEGATIVE MG/DL
HCT VFR BLD AUTO: 16.7 % (ref 36–48)
HCT VFR BLD AUTO: 21.7 % (ref 36–48)
HCT VFR BLD AUTO: 22.5 % (ref 36–48)
HCT VFR BLD AUTO: 24.4 % (ref 36–48)
HDSCOM,HDSCOM: ABNORMAL
HGB BLD-MCNC: 5.6 G/DL (ref 13–16)
HGB BLD-MCNC: 7.2 G/DL (ref 13–16)
HGB BLD-MCNC: 7.5 G/DL (ref 13–16)
HGB BLD-MCNC: 8 G/DL (ref 13–16)
HGB UR QL STRIP: ABNORMAL
HYALINE CASTS URNS QL MICRO: ABNORMAL /LPF (ref 0–2)
INR PPP: 1.3 (ref 0.8–1.2)
KETONES UR QL STRIP.AUTO: NEGATIVE MG/DL
LACTATE SERPL-SCNC: 2.1 MMOL/L (ref 0.4–2)
LEUKOCYTE ESTERASE UR QL STRIP.AUTO: NEGATIVE
LYMPHOCYTES # BLD: 2.1 K/UL (ref 0.8–3.5)
LYMPHOCYTES NFR BLD: 9 % (ref 20–51)
MAGNESIUM SERPL-MCNC: 1.6 MG/DL (ref 1.6–2.6)
MCH RBC QN AUTO: 28.6 PG (ref 24–34)
MCH RBC QN AUTO: 28.6 PG (ref 24–34)
MCH RBC QN AUTO: 28.7 PG (ref 24–34)
MCH RBC QN AUTO: 29.3 PG (ref 24–34)
MCHC RBC AUTO-ENTMCNC: 32.8 G/DL (ref 31–37)
MCHC RBC AUTO-ENTMCNC: 33.2 G/DL (ref 31–37)
MCHC RBC AUTO-ENTMCNC: 33.3 G/DL (ref 31–37)
MCHC RBC AUTO-ENTMCNC: 33.5 G/DL (ref 31–37)
MCV RBC AUTO: 86.1 FL (ref 74–97)
MCV RBC AUTO: 86.2 FL (ref 74–97)
MCV RBC AUTO: 87.1 FL (ref 74–97)
MCV RBC AUTO: 87.4 FL (ref 74–97)
METAMYELOCYTES NFR BLD MANUAL: 1 %
METHADONE UR QL: NEGATIVE
MONOCYTES # BLD: 1.2 K/UL (ref 0–1)
MONOCYTES NFR BLD: 5 % (ref 2–9)
MUCOUS THREADS URNS QL MICRO: ABNORMAL /LPF
NEUTS BAND NFR BLD MANUAL: 6 % (ref 0–5)
NEUTS SEG # BLD: 19.6 K/UL (ref 1.8–8)
NEUTS SEG NFR BLD: 79 % (ref 42–75)
NITRITE UR QL STRIP.AUTO: NEGATIVE
OPIATES UR QL: POSITIVE
PCP UR QL: NEGATIVE
PH UR STRIP: 6 [PH] (ref 5–8)
PHOSPHATE SERPL-MCNC: 2.9 MG/DL (ref 2.5–4.9)
PLATELET # BLD AUTO: 481 K/UL (ref 135–420)
PLATELET # BLD AUTO: 484 K/UL (ref 135–420)
PLATELET # BLD AUTO: 535 K/UL (ref 135–420)
PLATELET # BLD AUTO: 538 K/UL (ref 135–420)
PLATELET COMMENTS,PCOM: ABNORMAL
PMV BLD AUTO: 8.5 FL (ref 9.2–11.8)
PMV BLD AUTO: 8.8 FL (ref 9.2–11.8)
POTASSIUM SERPL-SCNC: 4 MMOL/L (ref 3.5–5.5)
POTASSIUM SERPL-SCNC: 4.9 MMOL/L (ref 3.5–5.5)
PROT UR STRIP-MCNC: 30 MG/DL
PROTHROMBIN TIME: 15.3 SEC (ref 11.5–15.2)
RBC # BLD AUTO: 1.91 M/UL (ref 4.7–5.5)
RBC # BLD AUTO: 2.52 M/UL (ref 4.7–5.5)
RBC # BLD AUTO: 2.61 M/UL (ref 4.7–5.5)
RBC # BLD AUTO: 2.8 M/UL (ref 4.7–5.5)
RBC #/AREA URNS HPF: ABNORMAL /HPF (ref 0–5)
RBC MORPH BLD: ABNORMAL
SODIUM SERPL-SCNC: 130 MMOL/L (ref 136–145)
SODIUM SERPL-SCNC: 131 MMOL/L (ref 136–145)
SP GR UR REFRACTOMETRY: 1.02 (ref 1–1.03)
TROPONIN I SERPL-MCNC: <0.02 NG/ML (ref 0–0.04)
UROBILINOGEN UR QL STRIP.AUTO: 0.2 EU/DL (ref 0.2–1)
WBC # BLD AUTO: 18.4 K/UL (ref 4.6–13.2)
WBC # BLD AUTO: 18.7 K/UL (ref 4.6–13.2)
WBC # BLD AUTO: 20.4 K/UL (ref 4.6–13.2)
WBC # BLD AUTO: 23 K/UL (ref 4.6–13.2)
WBC URNS QL MICRO: ABNORMAL /HPF (ref 0–4)

## 2017-08-19 PROCEDURE — 80048 BASIC METABOLIC PNL TOTAL CA: CPT | Performed by: SURGERY

## 2017-08-19 PROCEDURE — 81001 URINALYSIS AUTO W/SCOPE: CPT | Performed by: INTERNAL MEDICINE

## 2017-08-19 PROCEDURE — P9040 RBC LEUKOREDUCED IRRADIATED: HCPCS | Performed by: SURGERY

## 2017-08-19 PROCEDURE — 70450 CT HEAD/BRAIN W/O DYE: CPT

## 2017-08-19 PROCEDURE — 74011250636 HC RX REV CODE- 250/636: Performed by: SURGERY

## 2017-08-19 PROCEDURE — 83735 ASSAY OF MAGNESIUM: CPT | Performed by: SURGERY

## 2017-08-19 PROCEDURE — 80307 DRUG TEST PRSMV CHEM ANLYZR: CPT

## 2017-08-19 PROCEDURE — 85027 COMPLETE CBC AUTOMATED: CPT | Performed by: SURGERY

## 2017-08-19 PROCEDURE — 74011000258 HC RX REV CODE- 258: Performed by: SURGERY

## 2017-08-19 PROCEDURE — 93005 ELECTROCARDIOGRAM TRACING: CPT

## 2017-08-19 PROCEDURE — 87086 URINE CULTURE/COLONY COUNT: CPT | Performed by: INTERNAL MEDICINE

## 2017-08-19 PROCEDURE — 74011250637 HC RX REV CODE- 250/637: Performed by: SURGERY

## 2017-08-19 PROCEDURE — 87040 BLOOD CULTURE FOR BACTERIA: CPT

## 2017-08-19 PROCEDURE — P9016 RBC LEUKOCYTES REDUCED: HCPCS | Performed by: SURGERY

## 2017-08-19 PROCEDURE — 74011250636 HC RX REV CODE- 250/636: Performed by: INTERNAL MEDICINE

## 2017-08-19 PROCEDURE — 36430 TRANSFUSION BLD/BLD COMPNT: CPT

## 2017-08-19 PROCEDURE — 74011000258 HC RX REV CODE- 258: Performed by: INTERNAL MEDICINE

## 2017-08-19 PROCEDURE — 80048 BASIC METABOLIC PNL TOTAL CA: CPT

## 2017-08-19 PROCEDURE — 84100 ASSAY OF PHOSPHORUS: CPT | Performed by: SURGERY

## 2017-08-19 PROCEDURE — 85025 COMPLETE CBC W/AUTO DIFF WBC: CPT

## 2017-08-19 PROCEDURE — 85610 PROTHROMBIN TIME: CPT | Performed by: SURGERY

## 2017-08-19 PROCEDURE — 85730 THROMBOPLASTIN TIME PARTIAL: CPT | Performed by: SURGERY

## 2017-08-19 PROCEDURE — 65620000000 HC RM CCU GENERAL

## 2017-08-19 PROCEDURE — 82553 CREATINE MB FRACTION: CPT | Performed by: INTERNAL MEDICINE

## 2017-08-19 PROCEDURE — 83605 ASSAY OF LACTIC ACID: CPT

## 2017-08-19 PROCEDURE — 82330 ASSAY OF CALCIUM: CPT | Performed by: SURGERY

## 2017-08-19 PROCEDURE — 36415 COLL VENOUS BLD VENIPUNCTURE: CPT

## 2017-08-19 PROCEDURE — 71010 XR CHEST PORT: CPT

## 2017-08-19 RX ORDER — LORAZEPAM 2 MG/ML
2 INJECTION INTRAMUSCULAR
Status: DISCONTINUED | OUTPATIENT
Start: 2017-08-19 | End: 2017-08-20

## 2017-08-19 RX ORDER — MAGNESIUM SULFATE HEPTAHYDRATE 40 MG/ML
2 INJECTION, SOLUTION INTRAVENOUS ONCE
Status: COMPLETED | OUTPATIENT
Start: 2017-08-19 | End: 2017-08-19

## 2017-08-19 RX ORDER — LORAZEPAM 1 MG/1
1 TABLET ORAL
Status: DISCONTINUED | OUTPATIENT
Start: 2017-08-19 | End: 2017-08-20

## 2017-08-19 RX ORDER — LORAZEPAM 2 MG/ML
2 INJECTION INTRAMUSCULAR ONCE
Status: DISPENSED | OUTPATIENT
Start: 2017-08-19 | End: 2017-08-20

## 2017-08-19 RX ORDER — SODIUM CHLORIDE 9 MG/ML
250 INJECTION, SOLUTION INTRAVENOUS AS NEEDED
Status: DISCONTINUED | OUTPATIENT
Start: 2017-08-19 | End: 2017-08-25 | Stop reason: HOSPADM

## 2017-08-19 RX ORDER — LORAZEPAM 2 MG/ML
1 INJECTION INTRAMUSCULAR
Status: DISCONTINUED | OUTPATIENT
Start: 2017-08-19 | End: 2017-08-20

## 2017-08-19 RX ORDER — SODIUM CHLORIDE 0.9 % (FLUSH) 0.9 %
5-10 SYRINGE (ML) INJECTION EVERY 8 HOURS
Status: DISCONTINUED | OUTPATIENT
Start: 2017-08-19 | End: 2017-08-20

## 2017-08-19 RX ORDER — LORAZEPAM 1 MG/1
2 TABLET ORAL
Status: DISCONTINUED | OUTPATIENT
Start: 2017-08-19 | End: 2017-08-20

## 2017-08-19 RX ORDER — SODIUM CHLORIDE 0.9 % (FLUSH) 0.9 %
5-10 SYRINGE (ML) INJECTION AS NEEDED
Status: DISCONTINUED | OUTPATIENT
Start: 2017-08-19 | End: 2017-08-25 | Stop reason: HOSPADM

## 2017-08-19 RX ADMIN — ASPIRIN 81 MG 81 MG: 81 TABLET ORAL at 10:28

## 2017-08-19 RX ADMIN — LORAZEPAM 2 MG: 2 INJECTION INTRAMUSCULAR; INTRAVENOUS at 15:04

## 2017-08-19 RX ADMIN — Medication 10 ML: at 15:05

## 2017-08-19 RX ADMIN — SODIUM CHLORIDE 75 ML: 900 INJECTION, SOLUTION INTRAVENOUS at 17:56

## 2017-08-19 RX ADMIN — HYDROCODONE BITARTRATE AND ACETAMINOPHEN 2 TABLET: 5; 325 TABLET ORAL at 05:59

## 2017-08-19 RX ADMIN — Medication 10 ML: at 06:00

## 2017-08-19 RX ADMIN — HEPARIN SODIUM 5000 UNITS: 5000 INJECTION, SOLUTION INTRAVENOUS; SUBCUTANEOUS at 12:45

## 2017-08-19 RX ADMIN — CEFAZOLIN SODIUM 2 G: 2 SOLUTION INTRAVENOUS at 10:25

## 2017-08-19 RX ADMIN — LORAZEPAM 2 MG: 2 INJECTION INTRAMUSCULAR; INTRAVENOUS at 14:52

## 2017-08-19 RX ADMIN — HYDROMORPHONE HYDROCHLORIDE 0.5 MG: 1 INJECTION, SOLUTION INTRAMUSCULAR; INTRAVENOUS; SUBCUTANEOUS at 21:38

## 2017-08-19 RX ADMIN — VITAMIN D, TAB 1000IU (100/BT) 1000 UNITS: 25 TAB at 10:28

## 2017-08-19 RX ADMIN — Medication 10 ML: at 21:40

## 2017-08-19 RX ADMIN — POLYETHYLENE GLYCOL 3350 17 G: 17 POWDER, FOR SOLUTION ORAL at 10:28

## 2017-08-19 RX ADMIN — METOPROLOL TARTRATE 12.5 MG: 25 TABLET ORAL at 10:27

## 2017-08-19 RX ADMIN — DOCUSATE SODIUM 100 MG: 100 CAPSULE, LIQUID FILLED ORAL at 10:28

## 2017-08-19 RX ADMIN — Medication 10 ML: at 14:15

## 2017-08-19 RX ADMIN — LORAZEPAM 2 MG: 2 INJECTION INTRAMUSCULAR; INTRAVENOUS at 15:22

## 2017-08-19 RX ADMIN — Medication 1 CAPSULE: at 10:28

## 2017-08-19 RX ADMIN — DIPHENHYDRAMINE HYDROCHLORIDE 12.5 MG: 50 INJECTION, SOLUTION INTRAMUSCULAR; INTRAVENOUS at 12:46

## 2017-08-19 RX ADMIN — Medication 400 MG: at 10:28

## 2017-08-19 RX ADMIN — LOSARTAN POTASSIUM 50 MG: 50 TABLET, FILM COATED ORAL at 10:27

## 2017-08-19 RX ADMIN — LORAZEPAM 2 MG: 2 INJECTION INTRAMUSCULAR; INTRAVENOUS at 17:49

## 2017-08-19 RX ADMIN — DIPHENHYDRAMINE HYDROCHLORIDE 12.5 MG: 50 INJECTION, SOLUTION INTRAMUSCULAR; INTRAVENOUS at 21:38

## 2017-08-19 RX ADMIN — MAGNESIUM SULFATE HEPTAHYDRATE 2 G: 40 INJECTION, SOLUTION INTRAVENOUS at 22:17

## 2017-08-19 RX ADMIN — GABAPENTIN 300 MG: 300 CAPSULE ORAL at 13:40

## 2017-08-19 RX ADMIN — HEPARIN SODIUM 5000 UNITS: 5000 INJECTION, SOLUTION INTRAVENOUS; SUBCUTANEOUS at 05:59

## 2017-08-19 RX ADMIN — AMIODARONE HYDROCHLORIDE 200 MG: 200 TABLET ORAL at 10:28

## 2017-08-19 RX ADMIN — CEFAZOLIN SODIUM 2 G: 2 SOLUTION INTRAVENOUS at 00:38

## 2017-08-19 RX ADMIN — VANCOMYCIN HYDROCHLORIDE 1500 MG: 10 INJECTION, POWDER, LYOPHILIZED, FOR SOLUTION INTRAVENOUS at 22:16

## 2017-08-19 RX ADMIN — DIPHENHYDRAMINE HYDROCHLORIDE 12.5 MG: 50 INJECTION, SOLUTION INTRAMUSCULAR; INTRAVENOUS at 05:59

## 2017-08-19 RX ADMIN — Medication 325 MG: at 10:28

## 2017-08-19 RX ADMIN — HYDROMORPHONE HYDROCHLORIDE 0.5 MG: 1 INJECTION, SOLUTION INTRAMUSCULAR; INTRAVENOUS; SUBCUTANEOUS at 17:50

## 2017-08-19 RX ADMIN — ZINC SULFATE 220 MG (50 MG) CAPSULE 220 MG: CAPSULE at 10:28

## 2017-08-19 RX ADMIN — HYDROCODONE BITARTRATE AND ACETAMINOPHEN 2 TABLET: 5; 325 TABLET ORAL at 00:38

## 2017-08-19 RX ADMIN — DILTIAZEM HYDROCHLORIDE 30 MG: 30 TABLET, FILM COATED ORAL at 10:28

## 2017-08-19 RX ADMIN — Medication 250 MG: at 10:28

## 2017-08-19 RX ADMIN — GABAPENTIN 300 MG: 300 CAPSULE ORAL at 05:59

## 2017-08-19 RX ADMIN — HEPARIN SODIUM 5000 UNITS: 5000 INJECTION, SOLUTION INTRAVENOUS; SUBCUTANEOUS at 21:38

## 2017-08-19 RX ADMIN — FUROSEMIDE 20 MG: 20 TABLET ORAL at 10:27

## 2017-08-19 RX ADMIN — SODIUM CHLORIDE 75 ML/HR: 900 INJECTION, SOLUTION INTRAVENOUS at 04:00

## 2017-08-19 RX ADMIN — LORAZEPAM 2 MG: 2 INJECTION INTRAMUSCULAR; INTRAVENOUS at 13:36

## 2017-08-19 RX ADMIN — CEFAZOLIN SODIUM 2 G: 2 SOLUTION INTRAVENOUS at 15:26

## 2017-08-19 RX ADMIN — POTASSIUM CHLORIDE 20 MEQ: 1.5 POWDER, FOR SOLUTION ORAL at 10:28

## 2017-08-19 RX ADMIN — CYANOCOBALAMIN TAB 1000 MCG 1000 MCG: 1000 TAB at 10:28

## 2017-08-19 RX ADMIN — DULOXETINE HYDROCHLORIDE 60 MG: 30 CAPSULE, DELAYED RELEASE ORAL at 10:28

## 2017-08-19 RX ADMIN — PIPERACILLIN SODIUM,TAZOBACTAM SODIUM 3.38 G: 3; .375 INJECTION, POWDER, FOR SOLUTION INTRAVENOUS at 21:38

## 2017-08-19 NOTE — PROGRESS NOTES
Sitting up eating breakfast  VSS  Dressings clean  Labs reviewed  Will transfuse (had amp and bleed with excision of graft)  Cont ICU, no AC for now

## 2017-08-19 NOTE — PROGRESS NOTES
responded to Rapid Response for  Carlos Marmolejo, who is a 59 y.o.,male,     The  provided the following Interventions:  Provided crisis spiritual care and support. Offered prayers on behalf for the patient. Chart reviewed. The following outcomes were achieved:      Assessment:  There are no further spiritual or Faith issues which require intervention at this time. Plan:  Chaplains will continue to follow and will provide spiritual care as needed.  recommends bedside caregivers page  on duty if patient or family shows signs of acute spiritual or emotional distress.        [facility], [address] [facility]  [phone]

## 2017-08-19 NOTE — PROGRESS NOTES
Rapid Response Note  [facility]    Patient: Marcella Palmer 59 y.o. male  [id number]  1960      Admit Date: 8/17/2017   Admission Diagnosis: Ischemic Rest Pain of Left Lower Extremity  dx  Atherosclerosis of native artery of both lower extremities with intermittent claudication (HCC)  PAD (peripheral artery disease) (HCC)  Infected prosthetic vascular graft, sequela    RAPID RESPONSE     Rapid response called for altered mental status. Patient had a CIWA score of  26 and was given the first dose of ativan before the rapid. The patient was confused and agitated. Vitals at the beginning of the rapid were 123/99, HR: 85, 98 % oxygen saturation. The patient was getting his first blood transfusion and spiked a temperature of 38.1; before the transfusion started his temperature was 37.7. Transfusion was stopped. Temperature after that  was 37.7. Two mg of ativan were given four times. Medications Reviewed    ROS  Unable to obtain due to patient's mental status. OBJECTIVE     Visit Vitals    /83    Pulse 68    Temp 97.5 °F (36.4 °C)    Resp 27    Wt 69.2 kg (152 lb 9.6 oz)    SpO2 100%    BMI 21.9 kg/m2       Physical Exam    Medications administered: Ativan 2 mg x 4    EKG: ordered    Labs: CBC, BMP, Blood cultures x 2, lactic acid, ETOH level    ASSESSMENT, PLAN & DISPOSITION   Marcella Palmer is a 59y.o. year old male admitted for Ischemic Rest Pain of Left Lower Extremity  dx  Atherosclerosis of native artery of both lower extremities with intermittent claudication (HCC)  PAD (peripheral artery disease) (HCC)  Infected prosthetic vascular graft, sequela. Rapid response called for altered mental status. Patient had AKA of his left leg. Patient was being transfused for a Hb of 7.5 today. The blood was stopped because of the increased in temperature. The confusion started before the transfusion. Patient received 3 doses of ativan on the rapid response without any change in agitation. Labs were ordered to assess BMP, CBC, blood cultures, lactic acid and ETOH level. Pharmacist suggest phenobarbital 65 mg. Daughter showed up and stated that he has been having these episode intermittently for the past few weeks. Attempted to consult hospitalist without any answer. Patient condition currently: still agitated and confused with 3 point restraints. Disposition: 2353    Attending  Dr. Irena Bernal, notified of rapid response. In agreement with plan. Primary team resuming care.        Radames Payan MD  08/19/17 3:07 PM

## 2017-08-19 NOTE — PROGRESS NOTES
During Rapid Response Dr. Majo Monet called and spoke with Dr. Garret Christy who ordered consult to hospitalist. Since no callback notified from earlier page, RN re-paged hospitalist on call . Callback received from hospitalist, Dr. Brittany Moser updated on pt events, RRT and need for IM consult. Doctor aware of consult and stated that [facility].

## 2017-08-19 NOTE — CONSULTS
Hospitalist Consultation Note    NAME:  Mari Mata   :   1960   MRN:   [id number]     ATTENDING: Nemesio Latif  PCP:  Paz Da Silva MD    Date/Time:  2017 7:49 PM  Subjective:   REQUESTING PHYSICIAN: Dr. Saravia Mainland:   domingo Alfaro is a 59 y.o.   male  Has had a complicated history of peripheral vascular disease including recent management of infected vascular grafts and left AKA yesterday for ischemic rest pain who I was asked to see for confusion. Patient is currently remains delirious and restrained and is thus not a good historian. Per nursing, he was more alert this am and answering questions appropriately although with minimal word use. He was given benadryl for complaints of itching and over the course of the day has been increasingly confused and agitated, despite getting ativan. He was originally admitted on  with septic shock and MSSA septicemia secondary to infected left groin graft. He subsequently   Removal of graft and was dc'd to rehab with plans for cefazolin until . He stayed in acute rehab at [facility] until  and has been admitted back to vascular surgery service for management of what is thought to be an infected right vascular graft and ischemic rest pain. He underwent remobal of right infected graft with jump bypass and left AKA yesterday.           Past Medical History:   Diagnosis Date    Acute blood loss as cause of postoperative anemia 2017    Anticoagulated on Coumadin     Aortoiliac occlusive disease (HCC) 2017    Atherosclerosis of native artery of both lower extremities with intermittent claudication ([address] 2017    Benign hypertensive heart disease with systolic congestive heart failure, NYHA class 2 ([address] 2015    Carotid artery disease (HCC)     Chronic anemia     Chronic atrial fibrillation (HCC)     Chronic systolic heart failure (HCC)     Chronic ulcer of right foot ([address] 2017    Chronic ulcer of right heel ([facility] 8/8/2017    Coronary artery disease involving native coronary artery of native heart 3/15/2017    Successful stenting of Cx (Xience LESLEY) and RCA (Xience LESLEY) to 0% by Dr. Shu Man on 3/15/17.     DDD (degenerative disc disease), lumbar 1/26/2015    Dyslipidemia     Erectile dysfunction 7/5/2016    Euthyroid sick syndrome 4/6/2017    Hereditary peripheral neuropathy 11/15/2016    History of cardioversion 4/18/2017    S/P Synchronized external cardioversion (4/18/2017 - Dr. Adria Elam)    History of vitamin D deficiency 4/22/2017    Vitamin D 25-Hydroxy (4/22/2017) = 12.0; Vitamin D 25-Hydroxy (5/26/2017) = 38.7    Infection of vascular bypass graft ([facility] 7/24/2017    Left lower extremity    Ischemic cardiomyopathy     Peripheral artery disease ([facility] 1/25/2017    Spinal stenosis of lumbar region with radiculopathy 5/4/2015    Dr. Sonido Ruiz        Past Surgical History:   Procedure Laterality Date    CARDIAC CATHETERIZATION  3/8/2017         CARDIAC CATHETERIZATION  3/15/2017         CORONARY STENT SINGLE W/PTCA  3/15/2017         HX ATHERECTOMY Left 06/15/2017    S/P Atherectomy and balloon angioplasty of the left superficial femoral artery and above-knee popliteal artery (6/15/2017 - Dr. Janel Mckeon)    HX COLONOSCOPY  07/23/2015    polyps repeat 2020 5 years [address]u [address] Right 04/04/2017    S/P Right axillary to bifemoral artery bypass using an 8 mm Propaten graft from the right axilla to the right common femoral artery with a jump femoral-femoral bypass with another 8 mm Propaten external ring bypass; Right common femoral artery, and profunda femoris artery and superficial femoral artery endarterectomy causing an extensive surgery on the right side (4/4/2017 - Dr. Peter Addison)    HX FEMORAL BYPASS Right 04/07/2017    S/P Cutdown of femoral-femoral bypass, more on the left groin side; Right lower extremity angiography with first-order catheterization (4/7/2017 - Dr. Fanta High)    HX FEMORAL BYPASS Right 04/10/2017    S/P Right femoral to above-knee popliteal bypass with an 8 mm PTFE graft (4/10/2017 - Dr. Land)    HX OTHER SURGICAL Left 07/25/2017    S/P Removal of infected graft; Repair of left superficial femoral artery; Repair of left common femoral artery (7/25/2017 - Dr. Fanta High)    HX OTHER SURGICAL Right 06/27/2017    S/P Drainage of right side abscess (6/27/2017 - Dr. Fanta [facility])    HX OTHER SURGICAL Left 07/01/2017    S/P Left groin exploration; Left femoral repair pseudoaneurysm; Left wound washout; Wound VAC placement (7/01/2017 - Dr. Fanta High)    HX OTHER SURGICAL Left 07/04/2017    S/P Left common femoral artery ligation; Jump bypass from right to left fem-fem to a more distal superficial femoral artery using 8 mm external ringed Propaten graft; Left groin wound exploration and washout (7/4/2017 - Dr. Fanta High)       Social History   Substance Use Topics    Smoking status: Former Smoker    Smokeless tobacco: Never Used      Comment: quit in 2011    Alcohol use 1.2 oz/week     2 Cans of beer per week      Comment: 10 cans of beer a month        Family History   Problem Relation Age of Onset    Heart Disease Father         Allergies   Allergen Reactions    Morphine Other (comments)     Patient gets confused with morphine. Prior to Admission medications    Medication Sig Start Date End Date Taking? Authorizing Provider   gabapentin (NEURONTIN) 300 mg capsule Take 1 Cap by mouth every eight (8) hours. Indications: NEUROPATHIC PAIN 8/16/17  Yes Jennie Thompson MD   lactobacillus sp. 50 billion cpu (BIO-K PLUS) 50 billion cell -375 mg cap capsule Take 1 Cap by mouth daily. 8/16/17  Yes Jennie Thompson MD   losartan (COZAAR) 50 mg tablet Take 1 Tab by mouth daily.  Indications: Chronic Heart Failure, hypertension 8/16/17  Yes Jennie Thompson MD   magnesium oxide (MAG-OX) 400 mg tablet Take 1 Tab by mouth daily. 8/16/17  Yes Francy Harrington MD   collagenase (SANTYL) 250 unit/gram ointment Apply  to affected area daily. [right heel]  Indications: Skin Ulcer 8/16/17  Yes Francy Harrington MD   docusate sodium (COLACE) 100 mg capsule Take 1 Cap by mouth daily for 90 days. 8/7/17 11/5/17 Yes [address], [place]   furosemide (LASIX) 20 mg tablet 1 tablet daily 8/7/17  Yes JUNE Millan   potassium chloride (KLOR-CON) 20 mEq packet Take 1 Packet by mouth two (2) times daily (with meals). 8/7/17  Yes JUNE Millan   CEFAZOLIN SODIUM/DEXTROSE,ISO (CEFAZOLIN IN DEXTROSE, ISO-OS,) 2 gram/50 mL pgbk 50 mL by IntraVENous route every eight (8) hours. Stop 9/13/17. 8/7/17  Yes [address] [place], [place]   amiodarone (CORDARONE) 200 mg tablet Take 1 Tab by mouth daily. Indications: VENTRICULAR RATE CONTROL IN ATRIAL FIBRILLATION 7/13/17  Yes Liam Fisher MD   aspirin 81 mg chewable tablet Take 1 Tab by mouth daily (with breakfast). 6/2/17  Yes Robin Singer MD   cholecalciferol (VITAMIN D3) 1,000 unit tablet Take 1 Tab by mouth daily. 6/2/17  Yes Robin Singer MD   ascorbic acid, vitamin C, (VITAMIN C) 250 mg tablet Take 1 Tab by mouth daily (with breakfast). 6/2/17  Yes Robin Singer MD   DULoxetine (CYMBALTA) 60 mg capsule Take 1 Cap by mouth daily. 6/2/17  Yes Robin Singer MD   zinc sulfate (ZINCATE) 220 (50) mg capsule Take 1 Cap by mouth daily. 6/2/17  Yes Robin Singer MD   ferrous sulfate 325 mg (65 mg iron) tablet Take 1 Tab by mouth daily (with breakfast). 6/2/17  Yes Robin Singer MD   metoprolol tartrate (LOPRESSOR) 25 mg tablet Take 0.5 Tabs by mouth every twelve (12) hours. Indications: hypertension, VENTRICULAR RATE CONTROL IN ATRIAL FIBRILLATION 6/2/17  Yes Robles Hutton NP   dilTIAZem (CARDIZEM) 30 mg tablet Take 1 Tab by mouth Before breakfast, lunch, dinner and at bedtime.  Indications: hypertension 6/2/17  Yes Bernice Kapadia Sana Recinos, NP   cyanocobalamin (VITAMIN B-12) 1,000 mcg tablet Take 1 Tab by mouth daily. 10/6/16  Yes Ange Rich MD   oxyCODONE-acetaminophen (PERCOCET 10)  mg per tablet Take 1-2 Tabs by mouth every four (4) hours as needed. Max Daily Amount: 12 Tabs. 17   Lina SpencerJUNE Schmitt   polyethylene glycol (MIRALAX) 17 gram packet Take 1 Packet by mouth daily. 17   JUNE Millan   atorvastatin (LIPITOR) 40 mg tablet Take 1 Tab by mouth nightly. Indications: DYSLIPIDEMIA 17   Ange Rich MD       REVIEW OF SYSTEMS:     [x] Unable to obtain  ROS due to  [x]mental status change  []sedated   []intubated   []Total of 12 systems reviewed as follows:  Constitutional:  negative fever, negative chills, negative weight loss  Eyes:   negative diplopia or visual changes, negative eye pain  ENT:   negative coryza, negative sore throat  Respiratory:  negative cough, hemoptysis, dyspnea  Cards:   negative for chest pain, palpitations, lower extremity edema  GI:   negative for nausea, vomiting, diarrhea, and abdominal pain  Genitourinary: negative for frequency, dysuria and hematuria  Integument:  negative for rash and pruritus  Hematologic:  negative for easy bruising and gum/nose bleeding  Musculoskel: negative for myalgias, arthralgias, back pain and muscle weakness  Neurological:  negative for headaches, dizziness, vertigo, memory problems and gait problems  Behavl/Psych:  negative for feelings of anxiety, depression     Pertinent Positives include :    Objective:   VITALS:    Visit Vitals    /57    Pulse 82    Temp 97.4 °F (36.3 °C)    Resp 30    Wt 69.2 kg (152 lb 9.6 oz)    SpO2 99%    BMI 21.9 kg/m2     Temp (24hrs), Av.5 °F (36.9 °C), Min:97.4 °F (36.3 °C), Max:100.6 °F (38.1 °C)      PHYSICAL EXAM:   General:    Somnolent, restrained male, thin chronically ill appearing, agitated somewhat at times with non purposeful restless movements of head and extremities. Head:   Normocephalic, without obvious abnormality, atraumatic. Eyes:   Conjunctivae clear, anicteric sclerae. Pupils are equal  Nose:  Nares normal. No drainage. Throat:    Unable to visualize. Neck:  Supple, symmetrical,  no adenopathy,      and no JVD. Lungs:   Clear to auscultation bilaterally. No Wheezing or Rhonchi. No rales. Chest wall:  No tenderness or deformity. No Accessory muscle use. Heart:   Regular rate and rhythm,  no murmur, rub or gallop. Abdomen:   Soft, non-tender. Not distended. Bowel sounds normal. No masses  Extremities: Left AKA with surgical clips in place, wound appears clean with mild erythema, no discharge, no induration. Also see skin below. otw Extremities normal, atraumatic, No cyanosis. No edema. No clubbing  Skin:     Left lower ext with small wound proximally and laterally with wound vac in place no surrounding area of erythema induration. otw Texture, turgor normal. No rashes or lesions. Not Jaundiced  Lymph nodes: Cervical, supraclavicular normal.  Psych:  Good insight. Not depressed. Not anxious or agitated. Neurologic: EOMs intact. No facial asymmetry. No obvious gross motor abnormality. Somnolent. LAB DATA REVIEWED:    No components found for: [place]  Recent Labs      08/19/17   1450  08/19/17   0825  08/19/17   0600   NA   --    --   130*   K   --    --   4.0   CL   --    --   100   CO2   --    --   21   BUN   --    --   10   CREA   --    --   0.60   GLU   --    --   96   PHOS   --    --   2.9   CA   --    --   8.5   WBC  23.0*  18.7*  20.4*   HGB  8.0*  7.5*  5.6*   HCT  24.4*  22.5*  16.7*   PLT  538*  481*  535*         IMAGING RESULTS:    Recommendations/Plan:      -Acute metabolic encephalopathy due to below. Pt per pharmacy and nursing was on considerable amount of narcotic during recent hospitalization including PCA. ? Any component of withdrawal?    Prolonged QT as seen on EKG which would preclude use of haldol geodon for treatment of agitation. Limited option given ativan may be worsening agitation. Would hold benadryl. PLAN: see below. Also check cardiac enzymes, EKG. STAT head CT.    -Sepsis ? Source. Pt w/ leukocytosis, bandemia, elevated lactic acid, fever. History of MSSA bacteremia and wound infection,  infected vascular graft. Left groin graft removed during recent hospitalization, right one removed yesterday. Pt with bandemia today as well as increase in wbc. Was seen by ID during recent hospitalization for infected left graft  and started on cefazolin w/ end date 09/13 which he continues to have presently. PLAN: given concern for hospital acquired infection at this time, source still not obvious to me, my recommendation is to hold the cefazolin and start on vanc and zosyn. Agree with blood cx. Check CXR, ua also. -hyponatremia pt on hypovolemic side. Would hydrate and follow Na. - Anemia post operatively. Agree with blood transfusion. Follow and transfuse as needed. - Prolonged QTc of 668 on EKG 08/01 as seen on EKG, pt on amiodarone. Would cont to follow and monitor rhythm.    -history of ischemic CMO EF 40% on echo done 04/17. Compensated. On statin, asa, bb    -Afib on amiodarone. Rate controlled.        ___________________________________________________    Risk of deterioration:  []Low    []Moderate  [x]High              Prophylaxis:  []Lovenox  []Coumadin  [x]Hep SQ  []SCDs  []H2B/PPI    Discussed Code Status:    []Full Code      []DNR     ___________________________________________________    Care Plan discussed with:    []Patient   []Family    []Care Manager  [x]Nursing   [x]Attending    Total Time :      minutes    []Total Critical Care Time:     ___________________________________________________  Hospitalist: Vijaya Mares MD

## 2017-08-19 NOTE — PROGRESS NOTES
1330-After getting patient back to bed, he began becoming more restless, agitated, hallucinating and tactile, and frightened easily. Picking at this IV's and dressing. Nurse in room multiple times and support offered however unable to calm patient and reorient to situation. Pt showing sx of withdrawal and/or delirium per nursing assessment. Call to Dr. Garret Christy and orders for CIWA scale received. 1400-2mg IV ativan given 30 min ago per CIWA scale of 26 with no effect. Pt delirium worsening and pt agitation and aggressive movements in bed increasing. Page again to Dr. Garret Christy, plan for Haldol. 1415-Unable to administer Haldol due to contraindication with amiodarone, pt delirium worsening, pt at risk for injury to pull lines or open fresh wound. Page to RRT team for assistance, call out to Dr. Garret Christy. 1430-RRT at bedside, new orders received at bedside, pt still agitated, temp now up to 38.1 (blood transfusion started at 1355), blood transfusion STOPPED, team at bedside. No other signs of reaction except febrile, no rash, no hives, no hypotension or tachycardia. 1530-Pt orders for 6mg total of IV Ativan during RRT by DR. Jose F Culver over last hour, little improvement MD aware, orders to f/u with hospitalist and Dr. Garret Christy if new orders needed. Lab results pending. 1600-Unable to re-attempt blood transfusion at this time due to patient agitation and inability to keep arm straight and monitor VS during transfusion. Will re-attempt when pt calm.

## 2017-08-19 NOTE — PROGRESS NOTES
1805-, Internal medicine at bedside to see patient. Updated on pt status, events, VS and report of hx of delirium. Also pt's daughter, Omer Plaza mentioned that he was on high does of pain medicine in rehab and wonders if he was withdrawing from pain medicine. Analgesia on board however pt was taking higher doses per family. Updated to MD.   1204, Pt still restless but not \"thashing\" anymore, or agitated, intermittent periods of sleep for short periods over last hour. Analgesia given per daughter report. Soft limb restraints remain on since Rapid response. Release and reposition of patient with no resistance from patient. IVF continue at [facility] @ 75ccc/hr. Pt developed a small superficial abrasion to Left wrist, from banging his arms to side rails. Cleaned with NS and dressed with gauze. Gown changed. LLE aka wound remains open to air, staples in tact and incision approximated. Dressing to Rside wound remains CDI from this am. Wound vac dressing and cannister remains insitu, no leak, WNL. 1840-RN spoke again with pt's daughter, updated on pt status and POC.

## 2017-08-20 LAB
ANION GAP SERPL CALC-SCNC: 7 MMOL/L (ref 3–18)
ATRIAL RATE: 73 BPM
ATRIAL RATE: 79 BPM
ATRIAL RATE: 81 BPM
BASOPHILS # BLD: 0 K/UL (ref 0–0.06)
BASOPHILS NFR BLD: 0 % (ref 0–3)
BUN SERPL-MCNC: 9 MG/DL (ref 7–18)
BUN/CREAT SERPL: 16 (ref 12–20)
CALCIUM SERPL-MCNC: 8.4 MG/DL (ref 8.5–10.1)
CALCULATED P AXIS, ECG09: 74 DEGREES
CALCULATED P AXIS, ECG09: 75 DEGREES
CALCULATED P AXIS, ECG09: 79 DEGREES
CALCULATED R AXIS, ECG10: 31 DEGREES
CALCULATED R AXIS, ECG10: 35 DEGREES
CALCULATED R AXIS, ECG10: 7 DEGREES
CALCULATED T AXIS, ECG11: 37 DEGREES
CALCULATED T AXIS, ECG11: 47 DEGREES
CALCULATED T AXIS, ECG11: 90 DEGREES
CHLORIDE SERPL-SCNC: 101 MMOL/L (ref 100–108)
CO2 SERPL-SCNC: 24 MMOL/L (ref 21–32)
CREAT SERPL-MCNC: 0.55 MG/DL (ref 0.6–1.3)
DIAGNOSIS, 93000: NORMAL
DIFFERENTIAL METHOD BLD: ABNORMAL
EOSINOPHIL # BLD: 0 K/UL (ref 0–0.4)
EOSINOPHIL NFR BLD: 0 % (ref 0–5)
ERYTHROCYTE [DISTWIDTH] IN BLOOD BY AUTOMATED COUNT: 17.5 % (ref 11.6–14.5)
GLUCOSE SERPL-MCNC: 106 MG/DL (ref 74–99)
HCT VFR BLD AUTO: 22.6 % (ref 36–48)
HCT VFR BLD AUTO: 23.7 % (ref 36–48)
HGB BLD-MCNC: 7.4 G/DL (ref 13–16)
HGB BLD-MCNC: 7.9 G/DL (ref 13–16)
LYMPHOCYTES # BLD: 0.7 K/UL (ref 0.8–3.5)
LYMPHOCYTES NFR BLD: 4 % (ref 20–51)
MAGNESIUM SERPL-MCNC: 2.2 MG/DL (ref 1.6–2.6)
MCH RBC QN AUTO: 28.5 PG (ref 24–34)
MCHC RBC AUTO-ENTMCNC: 32.7 G/DL (ref 31–37)
MCV RBC AUTO: 86.9 FL (ref 74–97)
MONOCYTES # BLD: 2.9 K/UL (ref 0–1)
MONOCYTES NFR BLD: 16 % (ref 2–9)
NEUTS SEG # BLD: 14.6 K/UL (ref 1.8–8)
NEUTS SEG NFR BLD: 80 % (ref 42–75)
P-R INTERVAL, ECG05: 196 MS
P-R INTERVAL, ECG05: 198 MS
P-R INTERVAL, ECG05: 202 MS
PHOSPHATE SERPL-MCNC: 2.8 MG/DL (ref 2.5–4.9)
PLATELET # BLD AUTO: 468 K/UL (ref 135–420)
PLATELET COMMENTS,PCOM: ABNORMAL
PMV BLD AUTO: 8.7 FL (ref 9.2–11.8)
POTASSIUM SERPL-SCNC: 3.8 MMOL/L (ref 3.5–5.5)
Q-T INTERVAL, ECG07: 488 MS
Q-T INTERVAL, ECG07: 518 MS
Q-T INTERVAL, ECG07: 536 MS
QRS DURATION, ECG06: 102 MS
QTC CALCULATION (BEZET), ECG08: 537 MS
QTC CALCULATION (BEZET), ECG08: 593 MS
QTC CALCULATION (BEZET), ECG08: 622 MS
RBC # BLD AUTO: 2.6 M/UL (ref 4.7–5.5)
RBC MORPH BLD: ABNORMAL
SODIUM SERPL-SCNC: 132 MMOL/L (ref 136–145)
VENTRICULAR RATE, ECG03: 73 BPM
VENTRICULAR RATE, ECG03: 79 BPM
VENTRICULAR RATE, ECG03: 81 BPM
WBC # BLD AUTO: 18.2 K/UL (ref 4.6–13.2)

## 2017-08-20 PROCEDURE — 85025 COMPLETE CBC W/AUTO DIFF WBC: CPT | Performed by: SURGERY

## 2017-08-20 PROCEDURE — P9040 RBC LEUKOREDUCED IRRADIATED: HCPCS | Performed by: SURGERY

## 2017-08-20 PROCEDURE — 74011000258 HC RX REV CODE- 258: Performed by: INTERNAL MEDICINE

## 2017-08-20 PROCEDURE — 85018 HEMOGLOBIN: CPT | Performed by: SURGERY

## 2017-08-20 PROCEDURE — 74011250636 HC RX REV CODE- 250/636: Performed by: INTERNAL MEDICINE

## 2017-08-20 PROCEDURE — 74011250637 HC RX REV CODE- 250/637: Performed by: SURGERY

## 2017-08-20 PROCEDURE — 80048 BASIC METABOLIC PNL TOTAL CA: CPT | Performed by: SURGERY

## 2017-08-20 PROCEDURE — 84100 ASSAY OF PHOSPHORUS: CPT | Performed by: SURGERY

## 2017-08-20 PROCEDURE — 36430 TRANSFUSION BLD/BLD COMPNT: CPT

## 2017-08-20 PROCEDURE — 74011250636 HC RX REV CODE- 250/636: Performed by: SURGERY

## 2017-08-20 PROCEDURE — 65660000004 HC RM CVT STEPDOWN

## 2017-08-20 PROCEDURE — 36415 COLL VENOUS BLD VENIPUNCTURE: CPT | Performed by: SURGERY

## 2017-08-20 PROCEDURE — 93005 ELECTROCARDIOGRAM TRACING: CPT

## 2017-08-20 PROCEDURE — 83735 ASSAY OF MAGNESIUM: CPT | Performed by: SURGERY

## 2017-08-20 PROCEDURE — 74011250636 HC RX REV CODE- 250/636: Performed by: PHYSICIAN ASSISTANT

## 2017-08-20 RX ORDER — DEXTROSE MONOHYDRATE AND SODIUM CHLORIDE 5; .9 G/100ML; G/100ML
75 INJECTION, SOLUTION INTRAVENOUS CONTINUOUS
Status: DISCONTINUED | OUTPATIENT
Start: 2017-08-20 | End: 2017-08-22

## 2017-08-20 RX ORDER — HYDROMORPHONE HYDROCHLORIDE 1 MG/ML
1-2 INJECTION, SOLUTION INTRAMUSCULAR; INTRAVENOUS; SUBCUTANEOUS
Status: DISCONTINUED | OUTPATIENT
Start: 2017-08-20 | End: 2017-08-22

## 2017-08-20 RX ORDER — IPRATROPIUM BROMIDE AND ALBUTEROL SULFATE 2.5; .5 MG/3ML; MG/3ML
3 SOLUTION RESPIRATORY (INHALATION)
Status: DISCONTINUED | OUTPATIENT
Start: 2017-08-20 | End: 2017-08-22

## 2017-08-20 RX ORDER — AMIODARONE HYDROCHLORIDE 200 MG/1
200 TABLET ORAL DAILY
Status: DISCONTINUED | OUTPATIENT
Start: 2017-08-22 | End: 2017-08-25 | Stop reason: HOSPADM

## 2017-08-20 RX ADMIN — HYDROCODONE BITARTRATE AND ACETAMINOPHEN 2 TABLET: 5; 325 TABLET ORAL at 07:48

## 2017-08-20 RX ADMIN — HEPARIN SODIUM 5000 UNITS: 5000 INJECTION, SOLUTION INTRAVENOUS; SUBCUTANEOUS at 05:37

## 2017-08-20 RX ADMIN — HYDROMORPHONE HYDROCHLORIDE 0.5 MG: 1 INJECTION, SOLUTION INTRAMUSCULAR; INTRAVENOUS; SUBCUTANEOUS at 10:42

## 2017-08-20 RX ADMIN — Medication 250 MG: at 07:53

## 2017-08-20 RX ADMIN — DEXTROSE MONOHYDRATE AND SODIUM CHLORIDE 75 ML/HR: 5; .9 INJECTION, SOLUTION INTRAVENOUS at 16:31

## 2017-08-20 RX ADMIN — DIPHENHYDRAMINE HYDROCHLORIDE 12.5 MG: 50 INJECTION, SOLUTION INTRAMUSCULAR; INTRAVENOUS at 02:41

## 2017-08-20 RX ADMIN — HYDROMORPHONE HYDROCHLORIDE 1 MG: 1 INJECTION, SOLUTION INTRAMUSCULAR; INTRAVENOUS; SUBCUTANEOUS at 20:38

## 2017-08-20 RX ADMIN — HEPARIN SODIUM 5000 UNITS: 5000 INJECTION, SOLUTION INTRAVENOUS; SUBCUTANEOUS at 20:38

## 2017-08-20 RX ADMIN — Medication 10 ML: at 13:16

## 2017-08-20 RX ADMIN — HYDROMORPHONE HYDROCHLORIDE 1 MG: 1 INJECTION, SOLUTION INTRAMUSCULAR; INTRAVENOUS; SUBCUTANEOUS at 16:34

## 2017-08-20 RX ADMIN — ATORVASTATIN CALCIUM 40 MG: 40 TABLET, FILM COATED ORAL at 20:37

## 2017-08-20 RX ADMIN — PIPERACILLIN SODIUM,TAZOBACTAM SODIUM 3.38 G: 3; .375 INJECTION, POWDER, FOR SOLUTION INTRAVENOUS at 13:16

## 2017-08-20 RX ADMIN — VANCOMYCIN HYDROCHLORIDE 750 MG: 10 INJECTION, POWDER, LYOPHILIZED, FOR SOLUTION INTRAVENOUS at 21:00

## 2017-08-20 RX ADMIN — VITAMIN D, TAB 1000IU (100/BT) 1000 UNITS: 25 TAB at 07:50

## 2017-08-20 RX ADMIN — Medication 10 ML: at 21:10

## 2017-08-20 RX ADMIN — ASPIRIN 81 MG 81 MG: 81 TABLET ORAL at 07:55

## 2017-08-20 RX ADMIN — HYDROMORPHONE HYDROCHLORIDE 0.5 MG: 1 INJECTION, SOLUTION INTRAMUSCULAR; INTRAVENOUS; SUBCUTANEOUS at 00:41

## 2017-08-20 RX ADMIN — DIPHENHYDRAMINE HYDROCHLORIDE 12.5 MG: 50 INJECTION, SOLUTION INTRAMUSCULAR; INTRAVENOUS at 13:21

## 2017-08-20 RX ADMIN — LORAZEPAM 2 MG: 2 INJECTION INTRAMUSCULAR; INTRAVENOUS at 02:20

## 2017-08-20 RX ADMIN — Medication 1 CAPSULE: at 07:57

## 2017-08-20 RX ADMIN — FUROSEMIDE 20 MG: 20 TABLET ORAL at 07:54

## 2017-08-20 RX ADMIN — PIPERACILLIN SODIUM,TAZOBACTAM SODIUM 3.38 G: 3; .375 INJECTION, POWDER, FOR SOLUTION INTRAVENOUS at 20:38

## 2017-08-20 RX ADMIN — POTASSIUM CHLORIDE 20 MEQ: 1.5 POWDER, FOR SOLUTION ORAL at 07:51

## 2017-08-20 RX ADMIN — LORAZEPAM 1 MG: 2 INJECTION INTRAMUSCULAR; INTRAVENOUS at 05:37

## 2017-08-20 RX ADMIN — Medication 10 ML: at 05:37

## 2017-08-20 RX ADMIN — HYDROMORPHONE HYDROCHLORIDE 0.5 MG: 1 INJECTION, SOLUTION INTRAMUSCULAR; INTRAVENOUS; SUBCUTANEOUS at 08:21

## 2017-08-20 RX ADMIN — GABAPENTIN 300 MG: 300 CAPSULE ORAL at 20:37

## 2017-08-20 RX ADMIN — DIPHENHYDRAMINE HYDROCHLORIDE 12.5 MG: 50 INJECTION, SOLUTION INTRAMUSCULAR; INTRAVENOUS at 17:22

## 2017-08-20 RX ADMIN — LORAZEPAM 2 MG: 2 INJECTION INTRAMUSCULAR; INTRAVENOUS at 09:57

## 2017-08-20 RX ADMIN — HYDROMORPHONE HYDROCHLORIDE 0.5 MG: 1 INJECTION, SOLUTION INTRAMUSCULAR; INTRAVENOUS; SUBCUTANEOUS at 02:41

## 2017-08-20 RX ADMIN — PIPERACILLIN SODIUM,TAZOBACTAM SODIUM 3.38 G: 3; .375 INJECTION, POWDER, FOR SOLUTION INTRAVENOUS at 02:20

## 2017-08-20 RX ADMIN — AMIODARONE HYDROCHLORIDE 200 MG: 200 TABLET ORAL at 07:52

## 2017-08-20 RX ADMIN — ZINC SULFATE 220 MG (50 MG) CAPSULE 220 MG: CAPSULE at 07:53

## 2017-08-20 RX ADMIN — HEPARIN SODIUM 5000 UNITS: 5000 INJECTION, SOLUTION INTRAVENOUS; SUBCUTANEOUS at 13:16

## 2017-08-20 RX ADMIN — CYANOCOBALAMIN TAB 1000 MCG 1000 MCG: 1000 TAB at 07:54

## 2017-08-20 RX ADMIN — DIPHENHYDRAMINE HYDROCHLORIDE 12.5 MG: 50 INJECTION, SOLUTION INTRAMUSCULAR; INTRAVENOUS at 21:09

## 2017-08-20 RX ADMIN — VANCOMYCIN HYDROCHLORIDE 750 MG: 10 INJECTION, POWDER, LYOPHILIZED, FOR SOLUTION INTRAVENOUS at 13:24

## 2017-08-20 RX ADMIN — LOSARTAN POTASSIUM 50 MG: 50 TABLET, FILM COATED ORAL at 07:57

## 2017-08-20 RX ADMIN — DIPHENHYDRAMINE HYDROCHLORIDE 12.5 MG: 50 INJECTION, SOLUTION INTRAMUSCULAR; INTRAVENOUS at 08:34

## 2017-08-20 RX ADMIN — METOPROLOL TARTRATE 12.5 MG: 25 TABLET ORAL at 07:58

## 2017-08-20 RX ADMIN — Medication 400 MG: at 07:51

## 2017-08-20 RX ADMIN — METOPROLOL TARTRATE 12.5 MG: 25 TABLET ORAL at 20:37

## 2017-08-20 RX ADMIN — HYDROMORPHONE HYDROCHLORIDE 1 MG: 1 INJECTION, SOLUTION INTRAMUSCULAR; INTRAVENOUS; SUBCUTANEOUS at 14:26

## 2017-08-20 RX ADMIN — HYDROMORPHONE HYDROCHLORIDE 2 MG: 1 INJECTION, SOLUTION INTRAMUSCULAR; INTRAVENOUS; SUBCUTANEOUS at 22:47

## 2017-08-20 RX ADMIN — SODIUM CHLORIDE 1000 MG: 900 INJECTION, SOLUTION INTRAVENOUS at 05:37

## 2017-08-20 RX ADMIN — HYDROMORPHONE HYDROCHLORIDE 1 MG: 1 INJECTION, SOLUTION INTRAMUSCULAR; INTRAVENOUS; SUBCUTANEOUS at 18:19

## 2017-08-20 RX ADMIN — DILTIAZEM HYDROCHLORIDE 30 MG: 30 TABLET, FILM COATED ORAL at 07:52

## 2017-08-20 RX ADMIN — PIPERACILLIN SODIUM,TAZOBACTAM SODIUM 3.38 G: 3; .375 INJECTION, POWDER, FOR SOLUTION INTRAVENOUS at 07:48

## 2017-08-20 RX ADMIN — Medication 325 MG: at 07:50

## 2017-08-20 NOTE — CONSULTS
[facility] - Consult Note    Consultation request by Dr. Linda Knapp for prolonged QTc. Date of  Admission: 8/17/2017  3:11 PM   Primary Care Physician:  Brenda Dangelo MD     Assessment:     Patient Active Problem List   Diagnosis Code    Benign hypertensive heart disease with systolic congestive heart failure, NYHA class 2 ([facility]) I11.0, I50.20    DDD (degenerative disc disease), lumbar M51.36    Erectile dysfunction N52.9    Spinal stenosis of lumbar region with radiculopathy M48.06, M54.16    Hereditary peripheral neuropathy G60.9    Aortoiliac occlusive disease ([address] I74.09    Atherosclerosis of native artery of both lower extremities with intermittent claudication ([address] I70.213    Peripheral artery disease ([address] I73.9    Coronary artery disease involving native coronary artery of native heart I25.10    Chronic atrial fibrillation ([facility]) I48.2    Acute blood loss as cause of postoperative anemia D62    Impaired mobility and ADLs Z74.09    Anticoagulated on Coumadin Z51.81, Z79.01    Ischemic cardiomyopathy I25.5    Chronic systolic heart failure ([facility]) I50.22    Carotid artery disease ([facility]) I77.9    Hyperammonemia ([facility]) E72.20    Dyslipidemia E78.5    Euthyroid sick syndrome E07.81    Aftercare following surgery of the circulatory system Z48.812    Status post femoral-popliteal bypass surgery Z95.828    History of cardioversion Z98.890    Critical ischemia of lower extremity I99.8    Chronic ulcer of right foot ([address] L97.519    History of vitamin D deficiency Z86.39    Pseudoaneurysm of femoral artery ([facility]) I72.4    Infection of vascular bypass graft ([address] T82. 7XXA    Chronic anemia D64.9    Sepsis associated with internal vascular access ([address] T82. 7XXA, A41.9    Chronic ulcer of right heel ([address] L97.419    Ischemic rest pain of lower extremity ([facility]) I73.9    PAD (peripheral artery disease) ([facility]) I73.9    Infected prosthetic vascular graft ([address] T82. 7XXA -Prolonged QTc in setting of amiodarone and medications, 537 msec by EKG 8/20/17, 622 msec on 8/19/17  -PAD, s/p removal of infected graft, repair of superficial femoral artery 7/25/17 by Dr. Brian Donald, ultimately requiring amputation 8/18/17  -h/o sepsis  -Hyponatremia  -Acute metabolic encephalopathy, multifactorial  -Post-op anemia requiring transfusion  -h/o paroxysmal atrial fibrillation. Cardioversion April 2017 and on amiodarone. Previously on Coumadin. -CAD, s/p PCI with LESLEY to RCA/LCx 3/15/17  -Chronic diastolic heart failure on lasix 20 mg  -h/o cardiomyopathy with EF 40% by echo April 2017. Follow-up echo at [facility] on 7/23/17with EF 50%, pulmonary pressure 67 mmHg, dilated RV. -Spinal stenosis  -Dyslipidemia  -HTN    Primary cardiologist Dr. Jesus Pickard:     Asked to see for prolonged QTc, was normal in the office in June. Likely multifactorial from meds including amiodarone (taking for h/o a.fib with cardioversion last April). Mr. Marietta Garza is suprisingly maintaining sinus rhythm despite cardioversion in April. Given his rapid rates when in A.fib previously and recent stressor, will plan to continue amiodarone for now and monitor closely. Repeat EKG in AM.     History of Present Illness: This is a 59 y.o. male admitted for Ischemic Rest Pain of Left Lower Extremity;dx; Atheroscleros*. Patient complains of:    Patient admitted 7/24/17 to [address] [place] [address] for sepsis, infected left leg graft. He presented to [address] [place] [address] prior to that and transferred here for surgical repair. He had surgery 7/25/17 with removal of infected graft and repair of left femoral artery. He was discharged to rehab and subsequently developed worsening infection and was brought back into the hospital on 8/17/17 for worsening sepsis ultimately requiring left AKA on 8/18/17. He has been intermittently confused/agitated. We are asked to see for prolonged QTc while on amiodarone. No chest pain, dyspnea. Patient intermittently agitated, confused. Low grade fevers. Prolonged QTc. RRT called 8/19/17 15:00 for AMS. Review of Symptoms:  Except as stated above include:  Constitutional:  negative  Respiratory:  negative  Cardiovascular:  negative  Gastrointestinal: negative  Genitourinary:  negative  Musculoskeletal:  Negative  Neurological:  confused  Dermatological:  Negative  Endocrinological: Negative  Psychological:  agitated     Past Medical History:     Past Medical History:   Diagnosis Date    Acute blood loss as cause of postoperative anemia 4/4/2017    Anticoagulated on Coumadin     Aortoiliac occlusive disease ([address] 1/25/2017    Atherosclerosis of native artery of both lower extremities with intermittent claudication ([address] 1/25/2017    Benign hypertensive heart disease with systolic congestive heart failure, NYHA class 2 ([address] 1/26/2015    Carotid artery disease (HCC)     Chronic anemia     Chronic atrial fibrillation (HCC)     Chronic systolic heart failure (HCC)     Chronic ulcer of right foot ([address] 4/4/2017    Chronic ulcer of right heel ([address] 8/8/2017    Coronary artery disease involving native coronary artery of native heart 3/15/2017    Successful stenting of Cx (Xience LESLEY) and RCA (Xience LESLEY) to 0% by Dr. Marco Philip on 3/15/17.     DDD (degenerative disc disease), lumbar 1/26/2015    Dyslipidemia     Erectile dysfunction 7/5/2016    Euthyroid sick syndrome 4/6/2017    Hereditary peripheral neuropathy 11/15/2016    History of cardioversion 4/18/2017    S/P Synchronized external cardioversion (4/18/2017 - Dr. Diamond Mejia)    History of vitamin D deficiency 4/22/2017    Vitamin D 25-Hydroxy (4/22/2017) = 12.0; Vitamin D 25-Hydroxy (5/26/2017) = 38.7    Infection of vascular bypass graft ([address] 7/24/2017    Left lower extremity    Ischemic cardiomyopathy     Peripheral artery disease ([address] 1/25/2017    Spinal stenosis of lumbar region with radiculopathy 5/4/2015    Dr. Harrell Learn [place]         Social History:     Social History     Social History    Marital status: LEGALLY      Spouse name: N/A    Number of children: N/A    Years of education: N/A     Social History Main Topics    Smoking status: Former Smoker    Smokeless tobacco: Never Used      Comment: quit in 2011    Alcohol use 1.2 oz/week     2 Cans of beer per week      Comment: 10 cans of beer a month    Drug use: Yes     Special: Marijuana      Comment: occasionally    Sexual activity: Yes     Partners: Female     Other Topics Concern    None     Social History Narrative        Family History:     Family History   Problem Relation Age of Onset    Heart Disease Father         Medications: Allergies   Allergen Reactions    Morphine Other (comments)     Patient gets confused with morphine.         Current Facility-Administered Medications   Medication Dose Route Frequency    vancomycin (VANCOCIN) 750 mg in 0.9% sodium chloride 250 mL IVPB  750 mg IntraVENous Q8H    [START ON 8/21/2017] Vancomycin Lab Information  1 Each Other Once per day on Mon    HYDROmorphone (PF) (DILAUDID) injection 1-2 mg  1-2 mg IntraVENous Q2H PRN    dextrose 5% and 0.9% NaCl infusion  75 mL/hr IntraVENous CONTINUOUS    albuterol-ipratropium (DUO-NEB) 2.5 MG-0.5 MG/3 ML  3 mL Nebulization Q6H RT    [START ON 8/22/2017] amiodarone (CORDARONE) tablet 200 mg  200 mg Oral DAILY    0.9% sodium chloride infusion 250 mL  250 mL IntraVENous PRN    sodium chloride (NS) flush 5-10 mL  5-10 mL IntraVENous PRN    piperacillin-tazobactam (ZOSYN) 3.375 g in 0.9% sodium chloride (MBP/ADV) 100 mL MBP  3.375 g IntraVENous Q6H    ascorbic acid (vitamin C) (VITAMIN C) tablet 250 mg  250 mg Oral DAILY WITH BREAKFAST    aspirin chewable tablet 81 mg  81 mg Oral DAILY WITH BREAKFAST    atorvastatin (LIPITOR) tablet 40 mg  40 mg Oral QHS    cholecalciferol (VITAMIN D3) tablet 1,000 Units  1,000 Units Oral DAILY    cyanocobalamin tablet 1,000 mcg  1,000 mcg Oral DAILY    docusate sodium (COLACE) capsule 100 mg  100 mg Oral DAILY    ferrous sulfate tablet 325 mg  325 mg Oral DAILY WITH BREAKFAST    furosemide (LASIX) tablet 20 mg  20 mg Oral DAILY    gabapentin (NEURONTIN) capsule 300 mg  300 mg Oral Q8H    lactobacillus sp. 50 billion cpu (BIO-K PLUS) capsule 1 Cap  1 Cap Oral DAILY    losartan (COZAAR) tablet 50 mg  50 mg Oral DAILY    magnesium oxide (MAG-OX) tablet 400 mg  400 mg Oral DAILY    metoprolol tartrate (LOPRESSOR) tablet 12.5 mg  12.5 mg Oral Q12H    polyethylene glycol (MIRALAX) packet 17 g  17 g Oral DAILY    potassium chloride (KLOR-CON) packet 20 mEq  20 mEq Oral BID WITH MEALS    zinc sulfate (ZINCATE) capsule 220 mg  220 mg Oral DAILY    sodium chloride (NS) flush 5-10 mL  5-10 mL IntraVENous Q8H    sodium chloride (NS) flush 5-10 mL  5-10 mL IntraVENous PRN    acetaminophen (TYLENOL) tablet 650 mg  650 mg Oral Q4H PRN    naloxone (NARCAN) injection 0.4 mg  0.4 mg IntraVENous PRN    ondansetron (ZOFRAN) injection 4 mg  4 mg IntraVENous Q4H PRN    diphenhydrAMINE (BENADRYL) injection 12.5 mg  12.5 mg IntraVENous Q4H PRN    magnesium hydroxide (MILK OF MAGNESIA) 400 mg/5 mL oral suspension 30 mL  30 mL Oral DAILY PRN    heparin (porcine) injection 5,000 Units  5,000 Units SubCUTAneous Q8H    HYDROcodone-acetaminophen (NORCO) 5-325 mg per tablet 2 Tab  2 Tab Oral Q4H PRN         Physical Exam:     Visit Vitals    /86    Pulse 76    Temp 97.9 °F (36.6 °C)    Resp 30    Wt 62.3 kg (137 lb 5.6 oz)    SpO2 99%    BMI 19.71 kg/m2     BP Readings from Last 3 Encounters:   08/20/17 164/86   08/17/17 144/70   08/07/17 171/82     Pulse Readings from Last 3 Encounters:   08/20/17 76   08/17/17 67   08/07/17 68     Wt Readings from Last 3 Encounters:   08/19/17 62.3 kg (137 lb 5.6 oz)   08/07/17 75 kg (165 lb 5.5 oz)   08/05/17 74.9 kg (165 lb 2 oz)       General:  Sleepy but arousable, confused  Neck: nontender  Lungs:  decreased  Heart:  regular rate and rhythm, S1, S2 normal, no murmur, click, rub or gallop  Abdomen:  abdomen is soft without significant tenderness, masses, organomegaly or guarding  Extremities:  Left aka stump wrapped  Skin: Warm and dry. no hyperpigmentation, vitiligo, or suspicious lesions  Neuro: alert, oriented x3, affect appropriate, no focal neurological deficits, moves all extremities well, no involuntary movements, reflexes at knee and ankle intact  Psych: confused     Data Review:     Recent Labs      08/20/17   0830  08/20/17   0422  08/19/17   2155  08/19/17   1450   WBC   --   18.2*  18.4*  23.0*   HGB  7.9*  7.4*  7.2*  8.0*   HCT  23.7*  22.6*  21.7*  24.4*   PLT   --   468*  484*  538*     Recent Labs      08/20/17   0422  08/19/17   1450  08/19/17   0600   NA  132*  131*  130*   K  3.8  4.9  4.0   CL  101  99*  100   CO2  24  25  21   GLU  106*  98  96   BUN  9  11  10   CREA  0.55*  0.77  0.60   CA  8.4*  8.9  8.5   MG  2.2   --   1.6   PHOS  2.8   --   2.9   INR   --    --   1.3*       Results for orders placed or performed during the hospital encounter of 08/17/17   EKG, 12 LEAD, INITIAL   Result Value Ref Range    Ventricular Rate 81 BPM    Atrial Rate 81 BPM    P-R Interval 202 ms    QRS Duration 102 ms    Q-T Interval 536 ms    QTC Calculation (Bezet) 622 ms    Calculated P Axis 75 degrees    Calculated R Axis 35 degrees    Calculated T Axis 47 degrees    Diagnosis       Sinus rhythm with  Cannot rule out Anterior infarct , age undetermined  Prolonged QT  Abnormal ECG  When compared with ECG of 01-AUG-2017 23:26,  ST no longer depressed in Inferior leads  Nonspecific T wave abnormality now evident in Inferior leads  Nonspecific T wave abnormality, worse in Lateral leads  Confirmed by Mehdi Abraham ([id number]) on 8/20/2017 12:46:42 PM     Results for orders placed or performed in visit on 05/17/17   AMB POC EKG ROUTINE W/ 12 LEADS, INTER & REP    Narrative    Sinus rhythm. Nonspecific ST-T changes       All Cardiac Markers in the last 24 hours:    Lab Results   Component Value Date/Time     (H) 08/19/2017 09:55 PM    CKMB 4.3 (H) 08/19/2017 09:55 PM    CKND1 0.5 08/19/2017 09:55 PM    TROIQ <0.02 08/19/2017 09:55 PM       Last Lipid:    Lab Results   Component Value Date/Time    Cholesterol, total 157 12/29/2015 07:18 AM    HDL Cholesterol 28 12/29/2015 07:18 AM    LDL, calculated 112.4 12/29/2015 07:18 AM    Triglyceride 83 12/29/2015 07:18 AM    CHOL/HDL Ratio 5.6 12/29/2015 07:18 AM       Signed By: Mauro Bradshaw MD     August 20, 2017

## 2017-08-20 NOTE — PROGRESS NOTES
SUBJECTIVE:    Patient is resting comfortably in bed. Just got dilaudid per nursing. Off and on agitation. No nausea or vomiting,  Minimal PO intake. OBJECTIVE:    Visit Vitals    /72    Pulse 70    Temp 98.6 °F (37 °C)    Resp 27    Wt 62.3 kg (137 lb 5.6 oz)    SpO2 99%    BMI 19.71 kg/m2     RS: CTA bilaterally, no wheezes  CVS: RRR  GI: NT, ND, Soft  Extremities: left groin drainage cath in situ, L AKA and dressing in situ  General: NAD, Sleepy    ASSESSMENT:    1. Acute metabolic encephalopathy due to pain and/or sepsis  2. 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 Leukocytosis and LG fever  4. Hyponatremia   5. Anemia post operatively, unable to determine  6. Prolonged QTc initially  7. 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 Compensated. 8. Afib. Rate controlled.     PLAN:    Continue current dose of dilaudid and prn benadryl  Cont antibiotic and follow wbc  Can not give ativan as patient becomes more agitated with it per nursing  No Haldol as qTc is still 537  Add IVF and monitor  D/c Caredizem and cont BB  Please use Ativan if needed night time  Cardiology consulted - hold amiodarone and repeat EKG in am    CMP:   Lab Results   Component Value Date/Time     (L) 08/20/2017 04:22 AM    K 3.8 08/20/2017 04:22 AM     08/20/2017 04:22 AM    CO2 24 08/20/2017 04:22 AM    AGAP 7 08/20/2017 04:22 AM     (H) 08/20/2017 04:22 AM    BUN 9 08/20/2017 04:22 AM    CREA 0.55 (L) 08/20/2017 04:22 AM    GFRAA >60 08/20/2017 04:22 AM    GFRNA >60 08/20/2017 04:22 AM    CA 8.4 (L) 08/20/2017 04:22 AM    MG 2.2 08/20/2017 04:22 AM    PHOS 2.8 08/20/2017 04:22 AM     CBC:   Lab Results   Component Value Date/Time    WBC 18.2 (H) 08/20/2017 04:22 AM    HGB 7.9 (L) 08/20/2017 08:30 AM    HCT 23.7 (L) 08/20/2017 08:30 AM     (H) 08/20/2017 04:22 AM

## 2017-08-20 NOTE — PROGRESS NOTES
Events of last 24 hrs noted. Rapid response called due to worsening confusion and agitation  Pt currently resting comfortably; in soft wrist restraints  Per RN Ativan seems to make confusion worse   Dilaudid and Benadryl seem to be the most effective for pain control and agitation. Will increase dosage of Dilaudid and try to minimize Ativan use as able. Wound vacs in place. Left AKA incision intact, no signs of infection or hematoma. There is some small bloody drainage from incision from pt thrashing around. Right chest dressings c/d/i. Cultures negative thus far. Note change of ABX. Continue ICU care. Appreciate Medicine assistance.

## 2017-08-20 NOTE — ROUTINE PROCESS
0720: Report recieved and care assumed from [facility]. Vital signs stable, pt resting in bed. Will continue to monitor. 0830: Increasing agitation noted with pt, pt attempting to climb out of bed, throwing leg over side or bed. Hydromorphone and IV benadryl given for pain and agitation as worked well for pt during night per night shift nurse report. Left leg lower ext restraint applied, after multiple attempts to reposition and use of pillows, to keep leg in bed and keep pt from falling out of bed. MD aware. 1000: 2mg IV ativan given for CIWA score of 25. Will continue to monitor. 1100: Dr. Apple Roldan paged to update on pts increasing agitation and restlessness, and noted increased agitation after ativan dose given. Will continue to monitor. 1200: Updated [employer] PA with vasc team on pts condition and increased agitation. See [employer] noted for changes made with medications. Pt currently resting/sleeping comfortably. Will continue to monitor. 1845: Pt less restless and agitated this afternoon with IV dilaudid and benadryl, no ativan since 1000. Vital signs stable, pts family made aware of transfer to CVT stepdown. 1920: Bedside shift change report given to [address] (oncoming nurse) by Rishabh Reid RN (offgoing nurse). Report included the following information SBAR, Kardex, Intake/Output, MAR, Accordion, Med Rec Status, Cardiac Rhythm NSR and Alarm Parameters .

## 2017-08-21 PROBLEM — G93.41 ACUTE METABOLIC ENCEPHALOPATHY: Status: ACTIVE | Noted: 2017-08-21

## 2017-08-21 PROBLEM — Z47.81 AFTERCARE FOR AMPUTATION STUMP: Status: ACTIVE | Noted: 2017-08-18

## 2017-08-21 PROBLEM — E87.1 HYPONATREMIA: Status: ACTIVE | Noted: 2017-08-08

## 2017-08-21 PROBLEM — Z89.612 STATUS POST ABOVE KNEE AMPUTATION OF LEFT LOWER EXTREMITY: Status: ACTIVE | Noted: 2017-08-18

## 2017-08-21 PROBLEM — I27.20 MODERATE TO SEVERE PULMONARY HYPERTENSION (HCC): Chronic | Status: ACTIVE | Noted: 2017-07-23

## 2017-08-21 PROBLEM — D72.829 LEUKOCYTOSIS: Status: ACTIVE | Noted: 2017-08-19

## 2017-08-21 PROBLEM — R79.89 ELEVATED LACTIC ACID LEVEL: Status: ACTIVE | Noted: 2017-08-19

## 2017-08-21 PROBLEM — R94.31 PROLONGED Q-T INTERVAL ON ECG: Status: ACTIVE | Noted: 2017-08-19

## 2017-08-21 PROBLEM — T46.2X5A ADVERSE EFFECT OF AMIODARONE: Status: ACTIVE | Noted: 2017-08-19

## 2017-08-21 LAB
ABO + RH BLD: NORMAL
ALBUMIN SERPL-MCNC: 2.3 G/DL (ref 3.4–5)
ALBUMIN/GLOB SERPL: 0.5 {RATIO} (ref 0.8–1.7)
ALP SERPL-CCNC: 387 U/L (ref 45–117)
ALT SERPL-CCNC: 13 U/L (ref 16–61)
ANION GAP SERPL CALC-SCNC: 9 MMOL/L (ref 3–18)
AST SERPL-CCNC: 58 U/L (ref 15–37)
ATRIAL RATE: 74 BPM
BACTERIA SPEC CULT: NORMAL
BACTERIA SPEC CULT: NORMAL
BASOPHILS # BLD: 0 K/UL (ref 0–0.06)
BASOPHILS NFR BLD: 0 % (ref 0–3)
BILIRUB SERPL-MCNC: 0.8 MG/DL (ref 0.2–1)
BLD PROD TYP BPU: NORMAL
BLD PROD TYP BPU: NORMAL
BLOOD GROUP ANTIBODIES SERPL: NORMAL
BPU ID: NORMAL
BPU ID: NORMAL
BUN SERPL-MCNC: 8 MG/DL (ref 7–18)
BUN/CREAT SERPL: 14 (ref 12–20)
CALCIUM SERPL-MCNC: 8.5 MG/DL (ref 8.5–10.1)
CALCULATED P AXIS, ECG09: 68 DEGREES
CALCULATED R AXIS, ECG10: 34 DEGREES
CALCULATED T AXIS, ECG11: 87 DEGREES
CALLED TO:,BCALL1: NORMAL
CHLORIDE SERPL-SCNC: 104 MMOL/L (ref 100–108)
CK SERPL-CCNC: 777 U/L (ref 39–308)
CO2 SERPL-SCNC: 25 MMOL/L (ref 21–32)
CREAT SERPL-MCNC: 0.56 MG/DL (ref 0.6–1.3)
CROSSMATCH RESULT,%XM: NORMAL
CROSSMATCH RESULT,%XM: NORMAL
DATE LAST DOSE: ABNORMAL
DIAGNOSIS, 93000: NORMAL
DIFFERENTIAL METHOD BLD: ABNORMAL
EOSINOPHIL # BLD: 0.3 K/UL (ref 0–0.4)
EOSINOPHIL NFR BLD: 2 % (ref 0–5)
ERYTHROCYTE [DISTWIDTH] IN BLOOD BY AUTOMATED COUNT: 17.3 % (ref 11.6–14.5)
GLOBULIN SER CALC-MCNC: 5.1 G/DL (ref 2–4)
GLUCOSE SERPL-MCNC: 112 MG/DL (ref 74–99)
GRAM STN SPEC: NORMAL
GRAM STN SPEC: NORMAL
HCT VFR BLD AUTO: 22.9 % (ref 36–48)
HGB BLD-MCNC: 7.5 G/DL (ref 13–16)
LYMPHOCYTES # BLD: 1.6 K/UL (ref 0.8–3.5)
LYMPHOCYTES NFR BLD: 11 % (ref 20–51)
MCH RBC QN AUTO: 28.4 PG (ref 24–34)
MCHC RBC AUTO-ENTMCNC: 32.8 G/DL (ref 31–37)
MCV RBC AUTO: 86.7 FL (ref 74–97)
MONOCYTES # BLD: 1.5 K/UL (ref 0–1)
MONOCYTES NFR BLD: 10 % (ref 2–9)
NEUTS BAND NFR BLD MANUAL: 4 % (ref 0–5)
NEUTS SEG # BLD: 11.4 K/UL (ref 1.8–8)
NEUTS SEG NFR BLD: 73 % (ref 42–75)
P-R INTERVAL, ECG05: 194 MS
PLATELET # BLD AUTO: 491 K/UL (ref 135–420)
PLATELET COMMENTS,PCOM: ABNORMAL
PMV BLD AUTO: 9 FL (ref 9.2–11.8)
POTASSIUM SERPL-SCNC: 3.4 MMOL/L (ref 3.5–5.5)
PROT SERPL-MCNC: 7.4 G/DL (ref 6.4–8.2)
Q-T INTERVAL, ECG07: 412 MS
QRS DURATION, ECG06: 104 MS
QTC CALCULATION (BEZET), ECG08: 457 MS
RBC # BLD AUTO: 2.64 M/UL (ref 4.7–5.5)
RBC MORPH BLD: ABNORMAL
RBC MORPH BLD: ABNORMAL
REPORTED DOSE,DOSE: ABNORMAL UNITS
REPORTED DOSE/TIME,TMG: 2200
SERVICE CMNT-IMP: NORMAL
SERVICE CMNT-IMP: NORMAL
SODIUM SERPL-SCNC: 138 MMOL/L (ref 136–145)
SPECIMEN EXP DATE BLD: NORMAL
STATUS OF UNIT,%ST: NORMAL
STATUS OF UNIT,%ST: NORMAL
UNIT DIVISION, %UDIV: 0
UNIT DIVISION, %UDIV: 0
VANCOMYCIN TROUGH SERPL-MCNC: 8 UG/ML (ref 10–20)
VENTRICULAR RATE, ECG03: 74 BPM
WBC # BLD AUTO: 14.8 K/UL (ref 4.6–13.2)

## 2017-08-21 PROCEDURE — 74011000258 HC RX REV CODE- 258: Performed by: INTERNAL MEDICINE

## 2017-08-21 PROCEDURE — 80053 COMPREHEN METABOLIC PANEL: CPT | Performed by: INTERNAL MEDICINE

## 2017-08-21 PROCEDURE — 74011250637 HC RX REV CODE- 250/637: Performed by: SURGERY

## 2017-08-21 PROCEDURE — 93005 ELECTROCARDIOGRAM TRACING: CPT

## 2017-08-21 PROCEDURE — 85025 COMPLETE CBC W/AUTO DIFF WBC: CPT | Performed by: INTERNAL MEDICINE

## 2017-08-21 PROCEDURE — 77030011256 HC DRSG MEPILEX <16IN NO BORD MOLN -A

## 2017-08-21 PROCEDURE — 74011250636 HC RX REV CODE- 250/636: Performed by: PHYSICIAN ASSISTANT

## 2017-08-21 PROCEDURE — 65620000000 HC RM CCU GENERAL

## 2017-08-21 PROCEDURE — 74011000250 HC RX REV CODE- 250: Performed by: INTERNAL MEDICINE

## 2017-08-21 PROCEDURE — 94640 AIRWAY INHALATION TREATMENT: CPT

## 2017-08-21 PROCEDURE — 74011250636 HC RX REV CODE- 250/636: Performed by: INTERNAL MEDICINE

## 2017-08-21 PROCEDURE — 82550 ASSAY OF CK (CPK): CPT | Performed by: INTERNAL MEDICINE

## 2017-08-21 PROCEDURE — 80202 ASSAY OF VANCOMYCIN: CPT | Performed by: INTERNAL MEDICINE

## 2017-08-21 PROCEDURE — 36415 COLL VENOUS BLD VENIPUNCTURE: CPT | Performed by: INTERNAL MEDICINE

## 2017-08-21 PROCEDURE — 74011250636 HC RX REV CODE- 250/636: Performed by: SURGERY

## 2017-08-21 RX ADMIN — Medication 250 MG: at 08:02

## 2017-08-21 RX ADMIN — METOPROLOL TARTRATE 12.5 MG: 25 TABLET ORAL at 08:01

## 2017-08-21 RX ADMIN — SODIUM CHLORIDE 1000 MG: 900 INJECTION, SOLUTION INTRAVENOUS at 21:02

## 2017-08-21 RX ADMIN — HEPARIN SODIUM 5000 UNITS: 5000 INJECTION, SOLUTION INTRAVENOUS; SUBCUTANEOUS at 12:20

## 2017-08-21 RX ADMIN — Medication 10 ML: at 13:24

## 2017-08-21 RX ADMIN — SODIUM CHLORIDE 1000 MG: 900 INJECTION, SOLUTION INTRAVENOUS at 12:19

## 2017-08-21 RX ADMIN — HYDROMORPHONE HYDROCHLORIDE 2 MG: 1 INJECTION, SOLUTION INTRAMUSCULAR; INTRAVENOUS; SUBCUTANEOUS at 05:04

## 2017-08-21 RX ADMIN — LOSARTAN POTASSIUM 50 MG: 50 TABLET, FILM COATED ORAL at 08:01

## 2017-08-21 RX ADMIN — PIPERACILLIN SODIUM,TAZOBACTAM SODIUM 3.38 G: 3; .375 INJECTION, POWDER, FOR SOLUTION INTRAVENOUS at 19:51

## 2017-08-21 RX ADMIN — HYDROMORPHONE HYDROCHLORIDE 1 MG: 1 INJECTION, SOLUTION INTRAMUSCULAR; INTRAVENOUS; SUBCUTANEOUS at 21:00

## 2017-08-21 RX ADMIN — ASPIRIN 81 MG 81 MG: 81 TABLET ORAL at 08:02

## 2017-08-21 RX ADMIN — HYDROCODONE BITARTRATE AND ACETAMINOPHEN 2 TABLET: 5; 325 TABLET ORAL at 08:06

## 2017-08-21 RX ADMIN — POTASSIUM CHLORIDE 20 MEQ: 1.5 POWDER, FOR SOLUTION ORAL at 16:22

## 2017-08-21 RX ADMIN — PIPERACILLIN SODIUM,TAZOBACTAM SODIUM 3.38 G: 3; .375 INJECTION, POWDER, FOR SOLUTION INTRAVENOUS at 02:19

## 2017-08-21 RX ADMIN — HEPARIN SODIUM 5000 UNITS: 5000 INJECTION, SOLUTION INTRAVENOUS; SUBCUTANEOUS at 05:04

## 2017-08-21 RX ADMIN — HYDROMORPHONE HYDROCHLORIDE 2 MG: 1 INJECTION, SOLUTION INTRAMUSCULAR; INTRAVENOUS; SUBCUTANEOUS at 13:24

## 2017-08-21 RX ADMIN — PIPERACILLIN SODIUM,TAZOBACTAM SODIUM 3.38 G: 3; .375 INJECTION, POWDER, FOR SOLUTION INTRAVENOUS at 07:58

## 2017-08-21 RX ADMIN — HEPARIN SODIUM 5000 UNITS: 5000 INJECTION, SOLUTION INTRAVENOUS; SUBCUTANEOUS at 21:00

## 2017-08-21 RX ADMIN — Medication 325 MG: at 07:54

## 2017-08-21 RX ADMIN — ATORVASTATIN CALCIUM 40 MG: 40 TABLET, FILM COATED ORAL at 21:00

## 2017-08-21 RX ADMIN — Medication 10 ML: at 21:01

## 2017-08-21 RX ADMIN — HYDROMORPHONE HYDROCHLORIDE 2 MG: 1 INJECTION, SOLUTION INTRAMUSCULAR; INTRAVENOUS; SUBCUTANEOUS at 02:19

## 2017-08-21 RX ADMIN — DOCUSATE SODIUM 100 MG: 100 CAPSULE, LIQUID FILLED ORAL at 09:00

## 2017-08-21 RX ADMIN — POLYETHYLENE GLYCOL 3350 17 G: 17 POWDER, FOR SOLUTION ORAL at 08:04

## 2017-08-21 RX ADMIN — IPRATROPIUM BROMIDE AND ALBUTEROL SULFATE 3 ML: 2.5; .5 SOLUTION RESPIRATORY (INHALATION) at 08:52

## 2017-08-21 RX ADMIN — CYANOCOBALAMIN TAB 1000 MCG 1000 MCG: 1000 TAB at 08:02

## 2017-08-21 RX ADMIN — Medication 10 ML: at 06:00

## 2017-08-21 RX ADMIN — IPRATROPIUM BROMIDE AND ALBUTEROL SULFATE 3 ML: 2.5; .5 SOLUTION RESPIRATORY (INHALATION) at 20:05

## 2017-08-21 RX ADMIN — GABAPENTIN 300 MG: 300 CAPSULE ORAL at 07:58

## 2017-08-21 RX ADMIN — VITAMIN D, TAB 1000IU (100/BT) 1000 UNITS: 25 TAB at 08:02

## 2017-08-21 RX ADMIN — PIPERACILLIN SODIUM,TAZOBACTAM SODIUM 3.38 G: 3; .375 INJECTION, POWDER, FOR SOLUTION INTRAVENOUS at 13:43

## 2017-08-21 RX ADMIN — METOPROLOL TARTRATE 12.5 MG: 25 TABLET ORAL at 21:00

## 2017-08-21 RX ADMIN — FUROSEMIDE 20 MG: 20 TABLET ORAL at 08:01

## 2017-08-21 RX ADMIN — LIDOCAINE HYDROCHLORIDE: 10 INJECTION, SOLUTION EPIDURAL; INFILTRATION; INTRACAUDAL; PERINEURAL at 19:51

## 2017-08-21 RX ADMIN — GABAPENTIN 300 MG: 300 CAPSULE ORAL at 21:03

## 2017-08-21 RX ADMIN — Medication 400 MG: at 08:02

## 2017-08-21 RX ADMIN — HYDROMORPHONE HYDROCHLORIDE 2 MG: 1 INJECTION, SOLUTION INTRAMUSCULAR; INTRAVENOUS; SUBCUTANEOUS at 08:28

## 2017-08-21 RX ADMIN — VANCOMYCIN HYDROCHLORIDE 750 MG: 10 INJECTION, POWDER, LYOPHILIZED, FOR SOLUTION INTRAVENOUS at 05:51

## 2017-08-21 RX ADMIN — GABAPENTIN 300 MG: 300 CAPSULE ORAL at 13:25

## 2017-08-21 RX ADMIN — POTASSIUM CHLORIDE 20 MEQ: 1.5 POWDER, FOR SOLUTION ORAL at 07:54

## 2017-08-21 NOTE — ROUTINE PROCESS
0730: Report received and care assumed from [facility].. Vital signs stabe, pt A&O to self and situation this am.   0830: Restraints removed at this time. Will continue to monitor. 1200: Improved LOC and orientation. Pt sitting up in bed for lunch and visiting with family. Pain well managed with IV dilaudid. Will continue to monitor. 1645: Pt resting/sleeping this afternoon. Vital signs stable, pt less agitated and confused. Will continue to monitor. 1915: Bedside shift change report given to [facility]. (oncoming nurse) by Sophy Hearn RN (offgoing nurse). Report included the following information SBAR, Kardex, Intake/Output, MAR, Accordion, Med Rec Status, Cardiac Rhythm NSR and Alarm Parameters .

## 2017-08-21 NOTE — WOUND CARE
Physical Exam   Room 2353: Vascular wounds on right lateral ankle & right lateral heel are POA  Applied prevalon boot to right heel. Left AKA with wound vac on 2 sites: thigh & groin. Left AKA staple line is intact, some oozing between staples, applied mepilex dressings. Sacrum is intact. TAPS2 system straightened up as pt is fidgeting in bed & is restless. Will turn over care to nursing staff at this time.   Chidi ROWANN, RN, [facility], [address]

## 2017-08-21 NOTE — PROGRESS NOTES
[facility]  -  Progress Note      Patient: Mariella Chan MRN: [id number]  SSN: [id number]    YOB: 1960  Age: 59 y.o. Sex: male      Admit Date: 8/17/2017    Assessment:     Hospital Problems  Date Reviewed: 8/18/2017          Codes Class Noted POA    PAD (peripheral artery disease) ([facility] ICD-10-CM: I73.9  ICD-9-CM: 443.9  8/18/2017 Unknown        Infected prosthetic vascular graft ([facility] ICD-10-CM: T82. 7XXA  ICD-9-CM: 996.62  8/18/2017 Unknown        Atherosclerosis of native artery of both lower extremities with intermittent claudication (HCC) (Chronic) ICD-10-CM: G01.736  ICD-9-CM: 440.21  1/25/2017 Unknown            -Prolonged QTc in setting of amiodarone and medications, 537 msec by EKG 8/20/17, 622 msec on 8/19/17. .  Today QTc 457msec. -PAD, s/p removal of infected graft, repair of superficial femoral artery 7/25/17 by Dr. Fran Castaneda, ultimately requiring amputation 8/18/17  -h/o sepsis  -Hyponatremia  -Acute metabolic encephalopathy, multifactorial  -Post-op anemia requiring transfusion  -h/o paroxysmal atrial fibrillation. Cardioversion April 2017 and on amiodarone. Previously on Coumadin. -CAD, s/p PCI with LESLEY to RCA/LCx 3/15/17  -Chronic diastolic heart failure on lasix 20 mg  -h/o cardiomyopathy with EF 40% by echo April 2017. Follow-up echo at [facility] on 7/23/17with EF 50%, pulmonary pressure 67 mmHg, dilated RV. -Spinal stenosis  -Dyslipidemia  -HTN     Primary cardiologist Dr. Avendaño Pen:     His rate is stable and remains in NSR. His QTc is improving and will continue to monitor. Subjective:     No new complaints. He is restrained but alert and answers questions.     Objective:      Patient Vitals for the past 8 hrs:   Temp Pulse Resp BP SpO2   08/21/17 1100 - 78 24 - 100 %   08/21/17 1000 - 74 22 132/67 99 %   08/21/17 0852 - - - - 94 %   08/21/17 0801 - 75 - 138/66 -   08/21/17 0800 97.7 °F (36.5 °C) 73 19 138/66 98 %   08/21/17 0706 97.7 °F (36.5 °C) 70 18 159/84 99 %   08/21/17 0409 97.6 °F (36.4 °C) - - - -   08/21/17 0400 - 72 17 144/73 98 %         Patient Vitals for the past 96 hrs:   Weight   08/21/17 0217 63.1 kg (139 lb 1.8 oz)   08/19/17 2216 62.3 kg (137 lb 5.6 oz)   08/18/17 0729 69.2 kg (152 lb 9.6 oz)         Intake/Output Summary (Last 24 hours) at 08/21/17 1128  Last data filed at 08/21/17 0800   Gross per 24 hour   Intake          2311.25 ml   Output             1175 ml   Net          1136.25 ml       Physical Exam:  General:  alert, cooperative, no distress, appears stated age  Neck:  no JVD  Lungs:  clear to auscultation bilaterally  Heart:  regular rate and rhythm, S1, S2 normal, no murmur, click, rub or gallop  Abdomen:  abdomen is soft without significant tenderness, masses, organomegaly or guarding  Extremities:  extremities normal, atraumatic, no cyanosis or edema, L stump nontender    Data Review:     Labs: Results:       Chemistry Recent Labs      08/21/17   0549  08/20/17   0422  08/19/17   1450  08/19/17   0600   GLU  112*  106*  98  96   NA  138  132*  131*  130*   K  3.4*  3.8  4.9  4.0   CL  104  101  99*  100   CO2  25  24  25  21   BUN  8  9  11  10   CREA  0.56*  0.55*  0.77  0.60   CA  8.5  8.4*  8.9  8.5   MG   --   2.2   --   1.6   PHOS   --   2.8   --   2.9   AGAP  9  7  7  9   BUCR  14  16  14  17   AP  387*   --    --    --    TP  7.4   --    --    --    ALB  2.3*   --    --    --    GLOB  5.1*   --    --    --    AGRAT  0.5*   --    --    --       CBC w/Diff Recent Labs      08/21/17   0549  08/20/17   0830  08/20/17   0422  08/19/17   2155  08/19/17   1450   WBC  14.8*   --   18.2*  18.4*  23.0*   RBC  2.64*   --   2.60*  2.52*  2.80*   HGB  7.5*  7.9*  7.4*  7.2*  8.0*   HCT  22.9*  23.7*  22.6*  21.7*  24.4*   PLT  491*   --   468*  484*  538*   GRANS  73   --   80*   --   79*   LYMPH  11*   --   4*   --   9*   EOS  2   --   0   --   0      Cardiac Enzymes Lab Results   Component Value Date/Time     (H) 08/21/2017 05:49 AM      Coagulation Recent Labs      08/19/17   0600   PTP  15.3*   INR  1.3*   APTT  40.7*       Lipid Panel Lab Results   Component Value Date/Time    Cholesterol, total 157 12/29/2015 07:18 AM    HDL Cholesterol 28 12/29/2015 07:18 AM    LDL, calculated 112.4 12/29/2015 07:18 AM    VLDL, calculated 16.6 12/29/2015 07:18 AM    Triglyceride 83 12/29/2015 07:18 AM    CHOL/HDL Ratio 5.6 12/29/2015 07:18 AM      BNP No results found for: BNP, BNPP, XBNPT   Liver Enzymes Recent Labs      08/21/17   0549   TP  7.4   ALB  2.3*   AP  387*   SGOT  58*      Digoxin    Thyroid Studies Lab Results   Component Value Date/Time    TSH 6.74 08/02/2017 05:41 AM

## 2017-08-21 NOTE — PROGRESS NOTES
SUBJECTIVE:    Patient is resting comfortably. Woke up and denies for pain. No more agitation. No N/V. Appetite remains poor. OBJECTIVE:    Visit Vitals    /67 (BP 1 Location: Left arm, BP Patient Position: At rest)    Pulse 64    Temp 98 °F (36.7 °C)    Resp 19    Wt 63.1 kg (139 lb 1.8 oz)    SpO2 100%    BMI 19.96 kg/m2     RS: CTA bilaterally, no wheezes  CVS: RRR  GI: NT, ND, Soft  Extremities: left groin drainage cath in situ, L AKA and dressing in situ  General: NAD, follows commands     ASSESSMENT:    1. Acute metabolic encephalopathy due to pain and/or sepsis, improving  2. 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 Leukocytosis and LG fever, improving   4. Hyponatremia, resolved   5. Anemia, stable  6. Prolonged QTc initially, improving   7. 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 Compensated. 8. Afib. Rate controlled.     PLAN:    Continue current management  Dietician to follow  Replete K  Cardiology input noted  Check iron profile    CMP:   Lab Results   Component Value Date/Time     08/21/2017 05:49 AM    K 3.4 (L) 08/21/2017 05:49 AM     08/21/2017 05:49 AM    CO2 25 08/21/2017 05:49 AM    AGAP 9 08/21/2017 05:49 AM     (H) 08/21/2017 05:49 AM    BUN 8 08/21/2017 05:49 AM    CREA 0.56 (L) 08/21/2017 05:49 AM    GFRAA >60 08/21/2017 05:49 AM    GFRNA >60 08/21/2017 05:49 AM    CA 8.5 08/21/2017 05:49 AM    ALB 2.3 (L) 08/21/2017 05:49 AM    TP 7.4 08/21/2017 05:49 AM    GLOB 5.1 (H) 08/21/2017 05:49 AM    AGRAT 0.5 (L) 08/21/2017 05:49 AM    SGOT 58 (H) 08/21/2017 05:49 AM    ALT 13 (L) 08/21/2017 05:49 AM     CBC:   Lab Results   Component Value Date/Time    WBC 14.8 (H) 08/21/2017 05:49 AM    HGB 7.5 (L) 08/21/2017 05:49 AM    HCT 22.9 (L) 08/21/2017 05:49 AM     (H) 08/21/2017 05:49 AM

## 2017-08-21 NOTE — PROGRESS NOTES
No pain, feels well today  Wound dressings changed  No bleed  Amp site stable  Rehab eval  Continue abx

## 2017-08-22 LAB
ALBUMIN SERPL-MCNC: 2.3 G/DL (ref 3.4–5)
ALBUMIN/GLOB SERPL: 0.5 {RATIO} (ref 0.8–1.7)
ALP SERPL-CCNC: 734 U/L (ref 45–117)
ALT SERPL-CCNC: 18 U/L (ref 16–61)
ANION GAP SERPL CALC-SCNC: 8 MMOL/L (ref 3–18)
AST SERPL-CCNC: 62 U/L (ref 15–37)
ATRIAL RATE: 68 BPM
ATRIAL RATE: 72 BPM
BASOPHILS # BLD: 0 K/UL (ref 0–0.1)
BASOPHILS NFR BLD: 0 % (ref 0–2)
BILIRUB SERPL-MCNC: 0.8 MG/DL (ref 0.2–1)
BUN SERPL-MCNC: 7 MG/DL (ref 7–18)
BUN/CREAT SERPL: 13 (ref 12–20)
CALCIUM SERPL-MCNC: 8.1 MG/DL (ref 8.5–10.1)
CALCULATED P AXIS, ECG09: 51 DEGREES
CALCULATED P AXIS, ECG09: 66 DEGREES
CALCULATED R AXIS, ECG10: 19 DEGREES
CALCULATED R AXIS, ECG10: 20 DEGREES
CALCULATED T AXIS, ECG11: 55 DEGREES
CALCULATED T AXIS, ECG11: 71 DEGREES
CHLORIDE SERPL-SCNC: 101 MMOL/L (ref 100–108)
CK SERPL-CCNC: 644 U/L (ref 39–308)
CO2 SERPL-SCNC: 25 MMOL/L (ref 21–32)
CREAT SERPL-MCNC: 0.55 MG/DL (ref 0.6–1.3)
DATE LAST DOSE: NORMAL
DIAGNOSIS, 93000: NORMAL
DIAGNOSIS, 93000: NORMAL
DIFFERENTIAL METHOD BLD: ABNORMAL
EOSINOPHIL # BLD: 0.1 K/UL (ref 0–0.4)
EOSINOPHIL NFR BLD: 1 % (ref 0–5)
ERYTHROCYTE [DISTWIDTH] IN BLOOD BY AUTOMATED COUNT: 17.4 % (ref 11.6–14.5)
GLOBULIN SER CALC-MCNC: 4.4 G/DL (ref 2–4)
GLUCOSE SERPL-MCNC: 103 MG/DL (ref 74–99)
HCT VFR BLD AUTO: 23.1 % (ref 36–48)
HGB BLD-MCNC: 7.6 G/DL (ref 13–16)
IRON SATN MFR SERPL: 10 %
IRON SERPL-MCNC: 15 UG/DL (ref 50–175)
LACTATE SERPL-SCNC: 1.7 MMOL/L (ref 0.4–2)
LYMPHOCYTES # BLD: 1.8 K/UL (ref 0.9–3.6)
LYMPHOCYTES NFR BLD: 12 % (ref 21–52)
MCH RBC QN AUTO: 28.7 PG (ref 24–34)
MCHC RBC AUTO-ENTMCNC: 32.9 G/DL (ref 31–37)
MCV RBC AUTO: 87.2 FL (ref 74–97)
MONOCYTES # BLD: 1.3 K/UL (ref 0.05–1.2)
MONOCYTES NFR BLD: 9 % (ref 3–10)
NEUTS SEG # BLD: 11.3 K/UL (ref 1.8–8)
NEUTS SEG NFR BLD: 78 % (ref 40–73)
P-R INTERVAL, ECG05: 190 MS
P-R INTERVAL, ECG05: 202 MS
PLATELET # BLD AUTO: 513 K/UL (ref 135–420)
PMV BLD AUTO: 8.6 FL (ref 9.2–11.8)
POTASSIUM SERPL-SCNC: 4.1 MMOL/L (ref 3.5–5.5)
PROT SERPL-MCNC: 6.7 G/DL (ref 6.4–8.2)
Q-T INTERVAL, ECG07: 446 MS
Q-T INTERVAL, ECG07: 516 MS
QRS DURATION, ECG06: 102 MS
QRS DURATION, ECG06: 108 MS
QTC CALCULATION (BEZET), ECG08: 474 MS
QTC CALCULATION (BEZET), ECG08: 565 MS
RBC # BLD AUTO: 2.65 M/UL (ref 4.7–5.5)
REPORTED DOSE,DOSE: NORMAL UNITS
REPORTED DOSE/TIME,TMG: 500
SODIUM SERPL-SCNC: 134 MMOL/L (ref 136–145)
TIBC SERPL-MCNC: 146 UG/DL (ref 250–450)
VANCOMYCIN TROUGH SERPL-MCNC: 10.8 UG/ML (ref 10–20)
VENTRICULAR RATE, ECG03: 68 BPM
VENTRICULAR RATE, ECG03: 72 BPM
WBC # BLD AUTO: 14.5 K/UL (ref 4.6–13.2)

## 2017-08-22 PROCEDURE — 74011250637 HC RX REV CODE- 250/637: Performed by: SURGERY

## 2017-08-22 PROCEDURE — 80202 ASSAY OF VANCOMYCIN: CPT | Performed by: INTERNAL MEDICINE

## 2017-08-22 PROCEDURE — 97162 PT EVAL MOD COMPLEX 30 MIN: CPT

## 2017-08-22 PROCEDURE — 74011250636 HC RX REV CODE- 250/636: Performed by: INTERNAL MEDICINE

## 2017-08-22 PROCEDURE — 94640 AIRWAY INHALATION TREATMENT: CPT

## 2017-08-22 PROCEDURE — 74011250636 HC RX REV CODE- 250/636: Performed by: SURGERY

## 2017-08-22 PROCEDURE — 97530 THERAPEUTIC ACTIVITIES: CPT

## 2017-08-22 PROCEDURE — 77030008031

## 2017-08-22 PROCEDURE — 97165 OT EVAL LOW COMPLEX 30 MIN: CPT

## 2017-08-22 PROCEDURE — 36415 COLL VENOUS BLD VENIPUNCTURE: CPT | Performed by: INTERNAL MEDICINE

## 2017-08-22 PROCEDURE — 83540 ASSAY OF IRON: CPT | Performed by: INTERNAL MEDICINE

## 2017-08-22 PROCEDURE — 74011000258 HC RX REV CODE- 258: Performed by: INTERNAL MEDICINE

## 2017-08-22 PROCEDURE — 74011250636 HC RX REV CODE- 250/636: Performed by: PHYSICIAN ASSISTANT

## 2017-08-22 PROCEDURE — 65660000004 HC RM CVT STEPDOWN

## 2017-08-22 PROCEDURE — 82550 ASSAY OF CK (CPK): CPT | Performed by: INTERNAL MEDICINE

## 2017-08-22 PROCEDURE — 74011250637 HC RX REV CODE- 250/637: Performed by: INTERNAL MEDICINE

## 2017-08-22 PROCEDURE — 85025 COMPLETE CBC W/AUTO DIFF WBC: CPT | Performed by: INTERNAL MEDICINE

## 2017-08-22 PROCEDURE — 80053 COMPREHEN METABOLIC PANEL: CPT | Performed by: INTERNAL MEDICINE

## 2017-08-22 PROCEDURE — 74011000250 HC RX REV CODE- 250: Performed by: INTERNAL MEDICINE

## 2017-08-22 PROCEDURE — 93005 ELECTROCARDIOGRAM TRACING: CPT

## 2017-08-22 PROCEDURE — 83605 ASSAY OF LACTIC ACID: CPT | Performed by: INTERNAL MEDICINE

## 2017-08-22 PROCEDURE — 97535 SELF CARE MNGMENT TRAINING: CPT

## 2017-08-22 RX ORDER — IPRATROPIUM BROMIDE AND ALBUTEROL SULFATE 2.5; .5 MG/3ML; MG/3ML
3 SOLUTION RESPIRATORY (INHALATION)
Status: DISCONTINUED | OUTPATIENT
Start: 2017-08-22 | End: 2017-08-25 | Stop reason: HOSPADM

## 2017-08-22 RX ORDER — HYDROMORPHONE HYDROCHLORIDE 2 MG/1
2 TABLET ORAL
Status: DISCONTINUED | OUTPATIENT
Start: 2017-08-22 | End: 2017-08-23

## 2017-08-22 RX ORDER — HYDROMORPHONE HYDROCHLORIDE 1 MG/ML
1 INJECTION, SOLUTION INTRAMUSCULAR; INTRAVENOUS; SUBCUTANEOUS
Status: DISCONTINUED | OUTPATIENT
Start: 2017-08-22 | End: 2017-08-23

## 2017-08-22 RX ORDER — METOPROLOL TARTRATE 25 MG/1
25 TABLET, FILM COATED ORAL EVERY 12 HOURS
Status: DISCONTINUED | OUTPATIENT
Start: 2017-08-22 | End: 2017-08-25 | Stop reason: HOSPADM

## 2017-08-22 RX ORDER — POTASSIUM CHLORIDE 1.5 G/1.77G
20 POWDER, FOR SOLUTION ORAL DAILY
Status: DISCONTINUED | OUTPATIENT
Start: 2017-08-23 | End: 2017-08-23

## 2017-08-22 RX ADMIN — Medication 1 CAPSULE: at 09:00

## 2017-08-22 RX ADMIN — GABAPENTIN 300 MG: 300 CAPSULE ORAL at 05:22

## 2017-08-22 RX ADMIN — HEPARIN SODIUM 5000 UNITS: 5000 INJECTION, SOLUTION INTRAVENOUS; SUBCUTANEOUS at 12:49

## 2017-08-22 RX ADMIN — HYDROMORPHONE HYDROCHLORIDE 2 MG: 2 TABLET ORAL at 12:49

## 2017-08-22 RX ADMIN — FUROSEMIDE 20 MG: 20 TABLET ORAL at 08:58

## 2017-08-22 RX ADMIN — AMIODARONE HYDROCHLORIDE 200 MG: 200 TABLET ORAL at 09:00

## 2017-08-22 RX ADMIN — HYDROMORPHONE HYDROCHLORIDE 2 MG: 1 INJECTION, SOLUTION INTRAMUSCULAR; INTRAVENOUS; SUBCUTANEOUS at 01:01

## 2017-08-22 RX ADMIN — LOSARTAN POTASSIUM 50 MG: 50 TABLET, FILM COATED ORAL at 08:58

## 2017-08-22 RX ADMIN — HEPARIN SODIUM 5000 UNITS: 5000 INJECTION, SOLUTION INTRAVENOUS; SUBCUTANEOUS at 05:22

## 2017-08-22 RX ADMIN — ATORVASTATIN CALCIUM 40 MG: 40 TABLET, FILM COATED ORAL at 21:40

## 2017-08-22 RX ADMIN — Medication 5 ML: at 14:00

## 2017-08-22 RX ADMIN — CYANOCOBALAMIN TAB 1000 MCG 1000 MCG: 1000 TAB at 08:54

## 2017-08-22 RX ADMIN — IRON SUCROSE 300 MG: 20 INJECTION, SOLUTION INTRAVENOUS at 12:07

## 2017-08-22 RX ADMIN — HYDROMORPHONE HYDROCHLORIDE 2 MG: 2 TABLET ORAL at 18:20

## 2017-08-22 RX ADMIN — METOPROLOL TARTRATE 12.5 MG: 25 TABLET ORAL at 08:54

## 2017-08-22 RX ADMIN — AMPICILLIN AND SULBACTAM 3 G: 1; 2 INJECTION, POWDER, FOR SOLUTION INTRAMUSCULAR; INTRAVENOUS at 22:33

## 2017-08-22 RX ADMIN — HYDROMORPHONE HYDROCHLORIDE 2 MG: 2 TABLET ORAL at 22:13

## 2017-08-22 RX ADMIN — DOCUSATE SODIUM 100 MG: 100 CAPSULE, LIQUID FILLED ORAL at 08:58

## 2017-08-22 RX ADMIN — PIPERACILLIN SODIUM,TAZOBACTAM SODIUM 3.38 G: 3; .375 INJECTION, POWDER, FOR SOLUTION INTRAVENOUS at 01:01

## 2017-08-22 RX ADMIN — AMPICILLIN AND SULBACTAM 3 G: 1; 2 INJECTION, POWDER, FOR SOLUTION INTRAMUSCULAR; INTRAVENOUS at 15:51

## 2017-08-22 RX ADMIN — HYDROMORPHONE HYDROCHLORIDE 1 MG: 1 INJECTION, SOLUTION INTRAMUSCULAR; INTRAVENOUS; SUBCUTANEOUS at 14:31

## 2017-08-22 RX ADMIN — HEPARIN SODIUM 5000 UNITS: 5000 INJECTION, SOLUTION INTRAVENOUS; SUBCUTANEOUS at 21:00

## 2017-08-22 RX ADMIN — GABAPENTIN 300 MG: 300 CAPSULE ORAL at 21:40

## 2017-08-22 RX ADMIN — VITAMIN D, TAB 1000IU (100/BT) 1000 UNITS: 25 TAB at 08:58

## 2017-08-22 RX ADMIN — GABAPENTIN 300 MG: 300 CAPSULE ORAL at 14:21

## 2017-08-22 RX ADMIN — HYDROCODONE BITARTRATE AND ACETAMINOPHEN 2 TABLET: 5; 325 TABLET ORAL at 03:23

## 2017-08-22 RX ADMIN — ZINC SULFATE 220 MG (50 MG) CAPSULE 220 MG: CAPSULE at 08:59

## 2017-08-22 RX ADMIN — POTASSIUM CHLORIDE 20 MEQ: 1.5 POWDER, FOR SOLUTION ORAL at 08:54

## 2017-08-22 RX ADMIN — Medication 5 ML: at 22:00

## 2017-08-22 RX ADMIN — ASPIRIN 81 MG 81 MG: 81 TABLET ORAL at 09:00

## 2017-08-22 RX ADMIN — PIPERACILLIN SODIUM,TAZOBACTAM SODIUM 3.38 G: 3; .375 INJECTION, POWDER, FOR SOLUTION INTRAVENOUS at 08:53

## 2017-08-22 RX ADMIN — METOPROLOL TARTRATE 25 MG: 25 TABLET ORAL at 21:39

## 2017-08-22 RX ADMIN — SODIUM CHLORIDE 1000 MG: 900 INJECTION, SOLUTION INTRAVENOUS at 05:22

## 2017-08-22 RX ADMIN — HYDROMORPHONE HYDROCHLORIDE 1 MG: 1 INJECTION, SOLUTION INTRAMUSCULAR; INTRAVENOUS; SUBCUTANEOUS at 19:43

## 2017-08-22 RX ADMIN — Medication 10 ML: at 06:00

## 2017-08-22 RX ADMIN — IPRATROPIUM BROMIDE AND ALBUTEROL SULFATE 3 ML: 2.5; .5 SOLUTION RESPIRATORY (INHALATION) at 01:06

## 2017-08-22 RX ADMIN — DEXTROSE MONOHYDRATE AND SODIUM CHLORIDE 75 ML/HR: 5; .9 INJECTION, SOLUTION INTRAVENOUS at 03:23

## 2017-08-22 RX ADMIN — Medication 400 MG: at 08:59

## 2017-08-22 RX ADMIN — ACETAMINOPHEN 650 MG: 325 TABLET ORAL at 22:40

## 2017-08-22 RX ADMIN — POLYETHYLENE GLYCOL 3350 17 G: 17 POWDER, FOR SOLUTION ORAL at 09:02

## 2017-08-22 RX ADMIN — Medication 325 MG: at 08:59

## 2017-08-22 RX ADMIN — Medication 250 MG: at 09:00

## 2017-08-22 NOTE — PROGRESS NOTES
SUBJECTIVE:    Patient is sitting up in bed in NAD. Very pleasant. States he has no chest or abdominal pain. No nausea or vomiting. Ate all his breakfast. No back pain. Has had off and on surgical site pain. No dizziness. He is in good spirit. OBJECTIVE:    Visit Vitals    /76    Pulse 66    Temp 97.4 °F (36.3 °C)    Resp 28    Wt 67.5 kg (148 lb 13 oz)    SpO2 97%    BMI 21.35 kg/m2     RS: CTA bilaterally, no wheezes  CVS: RRR  GI: NT, ND, Soft  Extremities: left groin drainage cath in situ, L AKA and dressing in situ  General: NAD, follows commands     ASSESSMENT:    1. Acute metabolic encephalopathy due to pain, clinically improved   2. 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 Leukocytosis and LG fever, better  4. Hyponatremia, better  5. Anemia including iron deficiency anemia, stable  6. Prolonged QTc initially, improving   7. 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 Compensated. 8. Afib. Rate controlled.     PLAN:    Continue current management  Venofer x 1  Increase BB  D/C IVF and monitor  Re-check EKG in am  Can start PT/OT if okay with primary team  Can be transferred out to stepdown or telemetry bed      CMP:   Lab Results   Component Value Date/Time     (L) 08/22/2017 05:50 AM    K 4.1 08/22/2017 05:50 AM     08/22/2017 05:50 AM    CO2 25 08/22/2017 05:50 AM    AGAP 8 08/22/2017 05:50 AM     (H) 08/22/2017 05:50 AM    BUN 7 08/22/2017 05:50 AM    CREA 0.55 (L) 08/22/2017 05:50 AM    GFRAA >60 08/22/2017 05:50 AM    GFRNA >60 08/22/2017 05:50 AM    CA 8.1 (L) 08/22/2017 05:50 AM    ALB 2.3 (L) 08/22/2017 05:50 AM    TP 6.7 08/22/2017 05:50 AM    GLOB 4.4 (H) 08/22/2017 05:50 AM    AGRAT 0.5 (L) 08/22/2017 05:50 AM    SGOT 62 (H) 08/22/2017 05:50 AM    ALT 18 08/22/2017 05:50 AM     CBC:   Lab Results   Component Value Date/Time    WBC 14.5 (H) 08/22/2017 05:50 AM    HGB 7.6 (L) 08/22/2017 05:50 AM    HCT 23.1 (L) 08/22/2017 05:50 AM     (H) 08/22/2017 05:50 AM

## 2017-08-22 NOTE — CONSULTS
Infectious Disease Consultation Note    Requested by: dr. Mervat Fernández    Reason: infected right axillary femoral bypass graft    Current abx Prior abx   Pip/tazo, vancomycin since 8/17/17      Lines:       Assessment :    Danette Johnson a 59 y. o. male with CAD, severe peripheral arterial insufficiency admitted to [facility] on 8/17/17.      Recent hospitalization in 7/2017 for sepsis due to MSSA bloodstream infection (positive blood cultures 7/22, 7/24; negative blood cultures 7/28). Most likely source of MSSA bacteremia is infected left groin vascular graft. Patient appropriately underwent removal of infected graft, Repair of superficial femoral artery, left side; Repair of common femoral artery on 7/25/17. Intra op cx: MSSA. Clinical presentation consistent with acute LLE ischemia, infected right axillary femoral bypass graft. Patient has been appropriately managed with left AKA and excision of the infected right axillary femoral bypass graft. Intra op cultures negative likely due to prior cefazolin.      Clinically better     Recommendations:     1. D/c pip/tazo, vancomycin. Start iv ampicillin/sulbactam  2. F/u vascular surgery recommendations  3. Will need PICC line for outpt iv abx    Advance Care planning: full code: discussed  with patient/surrogate decision maker: Sarkis Laurent: [phone]      Thank you for consultation request. Above plan was discussed in details with patient,  and vascular team.  Please call me if any further questions or concerns. Will continue to participate in the care of this patient. HPI:    Danette Johnson a 59 y. o. male with CAD, severe peripheral arterial insufficiency admitted to [facility] on 8/17/17.        Looking back at his history, he has history of ax-fem bypass with jump fem-fem bypass and right fem-pop bypass. Recently he developed abscess over his ax-fem bypass requiring I&D and repair of left CFA pseudoaneurysm.  He unfortunately had blow out of his left femoral anastomosis and had left CFA ligation with jump bypass from right to left fem-fem bypass. He was admitted to [facility] on 7/22/17 for septic shock. CTA scan was negative for PE. ECHO showed severe right heart dilatation with reduced right ventricular global systolic function. EF 50%. He was placed on pressors and diuresed with good improvement. His WBCs were noted to be elevated at 27.7K and he was started on IV ABX. Reportedly blood cultures were positive however I am unable to review these results on connect care. He was noted to have exposed graft in the left groin with purulent drainage. He was transferred to [facility] for further treatment per his vascular surgeon.    He underwent Removal of infected graft, Repair of superficial femoral artery, left side; Repair of common femoral artery on 7/25/17. His blood cultures 7/24 and intra op cultures revealed  MSSA. I was consulted for further recommendations. I recommended to switch patient to nafcillin. Unfortunately, he developed volume overload with nafcillin and required switch to cefazolin. He was discharged to rehab with plans to continue cefazolin till 9/12/17. While at rehab, he developed acute LLE ischemia, infection of right axillofemoral bypass graft and was brought back into the hospital on 8/17/17  ultimately requiring left AKA on 8/18/17. He underwent removal of infected right axillary femoral bypass graft on 8/18/17. Intra op cultures are negative. I have been consulted for further recommendations.      Patient complains of some pain at the recent surgical site left groin. Patient denies headaches, visual disturbances, sore throat, runny nose, earaches, cp, sob, chills, cough, abdominal pain, diarrhea, burning micturition,  or weakness in extremities. He denies back pain/flank pain. He denies recent sick contacts. No h/o recent travel.  No known h/o MRSA colonization or infection in the past.                  Past Medical History:   Diagnosis Date    Acute blood loss as cause of postoperative anemia 4/4/2017    Anticoagulated on Coumadin     Aortoiliac occlusive disease (HCC) 1/25/2017    Atherosclerosis of native artery of both lower extremities with intermittent claudication ([address] 1/25/2017    Benign hypertensive heart disease with systolic congestive heart failure, NYHA class 2 ([address] 1/26/2015    Carotid artery disease (HCC)     Chronic anemia     Chronic systolic heart failure (HCC)     Chronic ulcer of right foot ([address] 4/4/2017    Chronic ulcer of right heel ([address] 8/8/2017    Coronary artery disease involving native coronary artery of native heart 3/15/2017    Successful stenting of Cx (Xience LESLEY) and RCA (Xience LESLEY) to 0% by Dr. Aisha Shelton on 3/15/17.     DDD (degenerative disc disease), lumbar 1/26/2015    Dyslipidemia     Erectile dysfunction 7/5/2016    Euthyroid sick syndrome 4/6/2017    Hereditary peripheral neuropathy 11/15/2016    History of cardioversion 4/18/2017    S/P Synchronized external cardioversion (4/18/2017 - Dr. Raegan Terry)    History of vitamin D deficiency 4/22/2017    Vitamin D 25-Hydroxy (4/22/2017) = 12.0; Vitamin D 25-Hydroxy (5/26/2017) = 38.7    Infection of vascular bypass graft ([address] 7/24/2017    Left lower extremity    Ischemic cardiomyopathy     Moderate to severe pulmonary hypertension ([address] 7/23/2017    Paroxysmal atrial fibrillation ([address]     Peripheral artery disease ([address] 1/25/2017    Spinal stenosis of lumbar region with radiculopathy 5/4/2015    Dr. Marian Miller       Past Surgical History:   Procedure Laterality Date    CARDIAC CATHETERIZATION  3/8/2017         CARDIAC CATHETERIZATION  3/15/2017         CORONARY STENT SINGLE W/PTCA  3/15/2017         HX ABOVE KNEE AMPUTATION Left 08/18/2017    S/P Left above-the-knee amputation (8/18/2017 - Dr. Pop Urbina)    HX ATHERECTOMY Left 06/15/2017    S/P Atherectomy and balloon angioplasty of the left superficial femoral artery and above-knee popliteal artery (6/15/2017 - Dr. Pop Urbina)    HX COLONOSCOPY  07/23/2015    polyps repeat 2020 5 years Teresa Henderson 89 Right 04/04/2017    S/P Right axillary to bifemoral artery bypass using an 8 mm Propaten graft from the right axilla to the right common femoral artery with a jump femoral-femoral bypass with another 8 mm Propaten external ring bypass; Right common femoral artery, and profunda femoris artery and superficial femoral artery endarterectomy causing an extensive surgery on the right side (4/4/2017 - Dr. Pablo Zelaya)    [address] 89 Right 04/07/2017    S/P Cutdown of femoral-femoral bypass, more on the left groin side; Right lower extremity angiography with first-order catheterization (4/7/2017 - Dr. Pop Urbina)    HX FEMORAL BYPASS Right 04/10/2017    S/P Right femoral to above-knee popliteal bypass with an 8 mm PTFE graft (4/10/2017 - Dr. Claritza Martino)    HX OTHER SURGICAL Left 07/25/2017    S/P Removal of infected graft; Repair of left superficial femoral artery; Repair of left common femoral artery (7/25/2017 - Dr. Pop Urbina)    HX OTHER SURGICAL Right 06/27/2017    S/P Drainage of right side abscess (6/27/2017 - Dr. Pop Urbina)    HX OTHER SURGICAL Left 07/01/2017    S/P Left groin exploration; Left femoral repair pseudoaneurysm; Left wound washout; Wound VAC placement (7/01/2017 - Dr. Pop Urbina)    HX OTHER SURGICAL Left 07/04/2017    S/P Left common femoral artery ligation; Jump bypass from right to left fem-fem to a more distal superficial femoral artery using 8 mm external ringed Propaten graft; Left groin wound exploration and washout (7/4/2017 - Dr. Pop Urbina)    HX OTHER SURGICAL Right 08/18/2017    S/P Right axillary femoral jump bypass interposition; Removal of infected right axillary femoral bypass;  Wound VAC placement of upper thigh 1.2 cm x 5.2 cm x 1.8 cm and groin 4 cm x 0.5 cm x 0.2 cm (8/18/2017 - Dr. Janel Mckeon)       home Medication List    Details   gabapentin (NEURONTIN) 300 mg capsule Take 1 Cap by mouth every eight (8) hours. Indications: NEUROPATHIC PAIN  Qty: 45 Cap, Refills: 0    Associated Diagnoses: Hereditary peripheral neuropathy      lactobacillus sp. 50 billion cpu (BIO-K PLUS) 50 billion cell -375 mg cap capsule Take 1 Cap by mouth daily. Qty: 15 Cap, Refills: 0    Associated Diagnoses: Infection of vascular bypass graft, subsequent encounter      losartan (COZAAR) 50 mg tablet Take 1 Tab by mouth daily. Indications: Chronic Heart Failure, hypertension  Qty: 15 Tab, Refills: 0    Associated Diagnoses: Benign hypertensive heart disease with systolic congestive heart failure, NYHA class 2 ([facility])      magnesium oxide (MAG-OX) 400 mg tablet Take 1 Tab by mouth daily. Qty: 15 Tab, Refills: 0      collagenase (SANTYL) 250 unit/gram ointment Apply  to affected area daily. [right heel]  Indications: Skin Ulcer  Qty: 15 g, Refills: 0    Associated Diagnoses: Chronic ulcer of right heel ([facility])      docusate sodium (COLACE) 100 mg capsule Take 1 Cap by mouth daily for 90 days. Qty: 30 Cap, Refills: 0      furosemide (LASIX) 20 mg tablet 1 tablet daily  Qty: 30 Tab, Refills: 0      potassium chloride (KLOR-CON) 20 mEq packet Take 1 Packet by mouth two (2) times daily (with meals). Qty: 60 Packet, Refills: 0      CEFAZOLIN SODIUM/DEXTROSE,ISO (CEFAZOLIN IN DEXTROSE, ISO-OS,) 2 gram/50 mL pgbk 50 mL by IntraVENous route every eight (8) hours. Stop 9/13/17. Qty: 50 mL, Refills: 0      amiodarone (CORDARONE) 200 mg tablet Take 1 Tab by mouth daily. Indications: VENTRICULAR RATE CONTROL IN ATRIAL FIBRILLATION  Qty: 60 Tab, Refills: 0      aspirin 81 mg chewable tablet Take 1 Tab by mouth daily (with breakfast). Qty: 90 Tab, Refills: 3      cholecalciferol (VITAMIN D3) 1,000 unit tablet Take 1 Tab by mouth daily.   Qty: 90 Tab, Refills: 3      ascorbic acid, vitamin C, (VITAMIN C) 250 mg tablet Take 1 Tab by mouth daily (with breakfast). Qty: 90 Tab, Refills: 2      DULoxetine (CYMBALTA) 60 mg capsule Take 1 Cap by mouth daily. Qty: 90 Cap, Refills: 2      zinc sulfate (ZINCATE) 220 (50) mg capsule Take 1 Cap by mouth daily. Qty: 90 Cap, Refills: 0      ferrous sulfate 325 mg (65 mg iron) tablet Take 1 Tab by mouth daily (with breakfast). Qty: 90 Tab, Refills: 3      metoprolol tartrate (LOPRESSOR) 25 mg tablet Take 0.5 Tabs by mouth every twelve (12) hours. Indications: hypertension, VENTRICULAR RATE CONTROL IN ATRIAL FIBRILLATION  Qty: 30 Tab, Refills: 6      dilTIAZem (CARDIZEM) 30 mg tablet Take 1 Tab by mouth Before breakfast, lunch, dinner and at bedtime. Indications: hypertension  Qty: 120 Tab, Refills: 6      cyanocobalamin (VITAMIN B-12) 1,000 mcg tablet Take 1 Tab by mouth daily. Qty: 90 Tab, Refills: 3      oxyCODONE-acetaminophen (PERCOCET 10)  mg per tablet Take 1-2 Tabs by mouth every four (4) hours as needed. Max Daily Amount: 12 Tabs. Qty: 80 Tab, Refills: 0      polyethylene glycol (MIRALAX) 17 gram packet Take 1 Packet by mouth daily. Qty: 30 Packet, Refills: 0      atorvastatin (LIPITOR) 40 mg tablet Take 1 Tab by mouth nightly.  Indications: DYSLIPIDEMIA  Qty: 90 Tab, Refills: 3             Current Facility-Administered Medications   Medication Dose Route Frequency    iron sucrose (VENOFER) 300 mg in 0.9% sodium chloride 250 mL IVPB  300 mg IntraVENous ONCE    [START ON 8/23/2017] potassium chloride (KLOR-CON) packet 20 mEq  20 mEq Oral DAILY    metoprolol tartrate (LOPRESSOR) tablet 25 mg  25 mg Oral Q12H    HYDROmorphone (PF) (DILAUDID) injection 1 mg  1 mg IntraVENous Q4H PRN    HYDROmorphone (DILAUDID) tablet 2 mg  2 mg Oral Q4H PRN    vancomycin (VANCOCIN) 1,000 mg in 0.9% sodium chloride (MBP/ADV) 250 mL adv  1,000 mg IntraVENous Q8H    Vancomycin Trough Due   1 Each Other Rx Dosing/Monitoring    albuterol-ipratropium (DUO-NEB) 2.5 MG-0.5 MG/3 ML  3 mL Nebulization Q6H RT    amiodarone (CORDARONE) tablet 200 mg  200 mg Oral DAILY    0.9% sodium chloride infusion 250 mL  250 mL IntraVENous PRN    sodium chloride (NS) flush 5-10 mL  5-10 mL IntraVENous PRN    piperacillin-tazobactam (ZOSYN) 3.375 g in 0.9% sodium chloride (MBP/ADV) 100 mL MBP  3.375 g IntraVENous Q6H    ascorbic acid (vitamin C) (VITAMIN C) tablet 250 mg  250 mg Oral DAILY WITH BREAKFAST    aspirin chewable tablet 81 mg  81 mg Oral DAILY WITH BREAKFAST    atorvastatin (LIPITOR) tablet 40 mg  40 mg Oral QHS    cholecalciferol (VITAMIN D3) tablet 1,000 Units  1,000 Units Oral DAILY    cyanocobalamin tablet 1,000 mcg  1,000 mcg Oral DAILY    docusate sodium (COLACE) capsule 100 mg  100 mg Oral DAILY    ferrous sulfate tablet 325 mg  325 mg Oral DAILY WITH BREAKFAST    furosemide (LASIX) tablet 20 mg  20 mg Oral DAILY    gabapentin (NEURONTIN) capsule 300 mg  300 mg Oral Q8H    lactobacillus sp. 50 billion cpu (BIO-K PLUS) capsule 1 Cap  1 Cap Oral DAILY    losartan (COZAAR) tablet 50 mg  50 mg Oral DAILY    magnesium oxide (MAG-OX) tablet 400 mg  400 mg Oral DAILY    polyethylene glycol (MIRALAX) packet 17 g  17 g Oral DAILY    zinc sulfate (ZINCATE) capsule 220 mg  220 mg Oral DAILY    sodium chloride (NS) flush 5-10 mL  5-10 mL IntraVENous Q8H    sodium chloride (NS) flush 5-10 mL  5-10 mL IntraVENous PRN    acetaminophen (TYLENOL) tablet 650 mg  650 mg Oral Q4H PRN    naloxone (NARCAN) injection 0.4 mg  0.4 mg IntraVENous PRN    ondansetron (ZOFRAN) injection 4 mg  4 mg IntraVENous Q4H PRN    diphenhydrAMINE (BENADRYL) injection 12.5 mg  12.5 mg IntraVENous Q4H PRN    magnesium hydroxide (MILK OF MAGNESIA) 400 mg/5 mL oral suspension 30 mL  30 mL Oral DAILY PRN    heparin (porcine) injection 5,000 Units  5,000 Units SubCUTAneous Q8H       Allergies: Morphine    Family History   Problem Relation Age of Onset    Heart Disease Father      Social History     Social History    Marital status: LEGALLY      Spouse name: N/A    Number of children: N/A    Years of education: N/A     Occupational History    Not on file. Social History Main Topics    Smoking status: Former Smoker    Smokeless tobacco: Never Used      Comment: quit in     Alcohol use 1.2 oz/week     2 Cans of beer per week      Comment: 10 cans of beer a month    Drug use: Yes     Special: Marijuana      Comment: occasionally    Sexual activity: Yes     Partners: Female     Other Topics Concern    Not on file     Social History Narrative     History   Smoking Status    Former Smoker   Smokeless Tobacco    Never Used     Comment: quit in         Temp (24hrs), Av.1 °F (36.7 °C), Min:97.4 °F (36.3 °C), Max:98.8 °F (37.1 °C)    Visit Vitals    /81    Pulse 72    Temp 97.4 °F (36.3 °C)    Resp 27    Wt 67.5 kg (148 lb 13 oz)    SpO2 100%    BMI 21.35 kg/m2       ROS: 12 point ROS obtained in details. Pertinent positives as mentioned in HPI,   otherwise negative    Physical Exam:    General: Well developed, well nourished male laying on the bed AAOx3 in no acute distress.     General:   awake alert and oriented   HEENT:  Normocephalic, atraumatic, PERRL, EOMI, no scleral icterus or pallor; no conjunctival hemmohage;  nasal and oral mucous are moist and without evidence of lesions. No thrush. Neck supple, no bruits. Lymph Nodes:   no cervical, axillary or inguinal adenopathy   Lungs:   non-labored, bilaterally clear to auscultation- no crackles wheezes rales or rhonchi   Heart:  RRR, s1 and s2; no  rubs or gallops, no edema, + pedal pulses   Abdomen:  soft, non-distended, active bowel sounds, no hepatomegaly, no splenomegaly.   dressing right lower abdomen, no erythema   Genitourinary:  deferred   Extremities:   no clubbing, cyanosis; no joint effusions or swelling; drain left groin; muscle mass appropriate for age, left aka stump covered with dressing   Neurologic:  No gross focal sensory abnormalities; 5/5 muscle strength to upper and lower extremities. Speech appropriate.  Cranial nerves intact                        Skin:  Surgical changes LLE, abdominal wall   Back:  no spinal or paraspinal muscle tenderness or rigidity, no CVA tenderness   Psychiatric:  No suicidal or homicidal ideations, appropriate mood and affect          Labs: Results:   Chemistry Recent Labs      08/22/17   0550  08/21/17   0549  08/20/17   0422   GLU  103*  112*  106*   NA  134*  138  132*   K  4.1  3.4*  3.8   CL  101  104  101   CO2  25  25  24   BUN  7  8  9   CREA  0.55*  0.56*  0.55*   CA  8.1*  8.5  8.4*   AGAP  8  9  7   BUCR  13  14  16   AP  734*  387*   --    TP  6.7  7.4   --    ALB  2.3*  2.3*   --    GLOB  4.4*  5.1*   --    AGRAT  0.5*  0.5*   --       CBC w/Diff Recent Labs      08/22/17   0550  08/21/17   0549  08/20/17   0830  08/20/17   0422   WBC  14.5*  14.8*   --   18.2*   RBC  2.65*  2.64*   --   2.60*   HGB  7.6*  7.5*  7.9*  7.4*   HCT  23.1*  22.9*  23.7*  22.6*   PLT  513*  491*   --   468*   GRANS  78*  73   --   80*   LYMPH  12*  11*   --   4*   EOS  1  2   --   0      Microbiology Recent Labs      08/19/17   2206  08/19/17   1540  08/19/17   1450   CULT  NO GROWTH 2 DAYS  NO GROWTH 3 DAYS  NO GROWTH 3 DAYS          RADIOLOGY:    All available imaging studies/reports in [facility] for this admission were reviewed    Dr. Edd Gerard, Infectious Disease Specialist  [phone]  August 22, 2017  12:28 PM

## 2017-08-22 NOTE — PROGRESS NOTES
Problem: Mobility Impaired (Adult and Pediatric)  Goal: *Acute Goals and Plan of Care (Insert Text)  Physical Therapy Goals  Initiated 8/22/2017 and to be accomplished within 7 day(s)  1. Patient will move from supine to sit and sit to supine and scoot up and down in bed with supervision/set-up. 2. Patient will transfer from bed to chair and chair to bed with minimal assistance/contact guard assist using the least restrictive device. 3. Patient will perform sit to stand with minimal assistance/contact guard assist.  4. Patient will ambulate with minimal assistance/contact guard assist for 50 feet with the least restrictive device. Outcome: Progressing Towards Goal  PHYSICAL THERAPY EVALUATION     Patient: Iesha Arenas (16 y.o. male)  Date: 8/22/2017  Primary Diagnosis: Ischemic Rest Pain of Left Lower Extremity  dx  Atherosclerosis of native artery of both lower extremities with intermittent claudication (HCC)  PAD (peripheral artery disease) (HCC)  Infected prosthetic vascular graft, sequela  Procedure(s) (LRB):  removal of right infected graft with jump bypass (Right)  AMPUTATION KNEE(AKA)-left leg (Left) 4 Days Post-Op   Precautions: Fall, Skin      ASSESSMENT :  Based on the objective data described below, the patient presents with impaired functional mobility including bed mobility, transfers, ambulation, and general activity tolerance following L AKA. Patient presents today supine in bed with HOB elevated, wound vac to L thigh, L amputation incision covered with clean bandage. Patient transferred to sitting EOB with Waylon, demonstrates impaired sitting balance requiring VCs to maintain midline posture. Patient performed sit to stand using RW and ModA X 2, required continuous assistance to maintain upright posture due to R trunk lean. Patient unable to perform hop using RW, returned to sitting on EOB and assisted back to supine with MaxA to scoot to [facility].  Patient inquiring into use of crutches in future, will continue to monitor ambulation progress to determine most appropriate assistive device. Patient left supine with HOB elevated, call bell in reach, and nurse notified. Patient will benefit from skilled intervention to address the above impairments. Patients rehabilitation potential is considered to be Good  Factors which may influence rehabilitation potential include:   [ ]         None noted  [ ]         Mental ability/status  [X]         Medical condition  [ ]         Home/family situation and support systems  [X]         Safety awareness  [ ]         Pain tolerance/management  [ ]         Other:        PLAN :  Recommendations and Planned Interventions:  [X]           Bed Mobility Training             [X]    Neuromuscular Re-Education  [X]           Transfer Training                   [ ]    Orthotic/Prosthetic Training  [X]           Gait Training                          [ ]    Modalities  [X]           Therapeutic Exercises          [ ]    Edema Management/Control  [X]           Therapeutic Activities            [X]    Patient and Family Training/Education  [ ]           Other (comment):     Frequency/Duration: Patient will be followed by physical therapy 1-2 times per day to address goals. Discharge Recommendations: Inpatient Rehab  Further Equipment Recommendations for Discharge: To be determined       SUBJECTIVE:   Patient stated Don't you want to see the left leg move?       OBJECTIVE DATA SUMMARY:       Past Medical History:   Diagnosis Date    Acute blood loss as cause of postoperative anemia 4/4/2017    Anticoagulated on Coumadin      Aortoiliac occlusive disease (HCC) 1/25/2017    Atherosclerosis of native artery of both lower extremities with intermittent claudication ([facility] [address] 1/25/2017    Benign hypertensive heart disease with systolic congestive heart failure, NYHA class 2 ([address] 1/26/2015    Carotid artery disease (HCC)      Chronic anemia      Chronic systolic heart failure ([address]  Chronic ulcer of right foot ([address] 4/4/2017    Chronic ulcer of right heel ([address] 8/8/2017    Coronary artery disease involving native coronary artery of native heart 3/15/2017     Successful stenting of Cx (Xience LESLEY) and RCA (Xience LESLEY) to 0% by Dr. Rashid Calderon on 3/15/17.     DDD (degenerative disc disease), lumbar 1/26/2015    Dyslipidemia      Erectile dysfunction 7/5/2016    Euthyroid sick syndrome 4/6/2017    Hereditary peripheral neuropathy 11/15/2016    History of cardioversion 4/18/2017     S/P Synchronized external cardioversion (4/18/2017 - Dr. Coy Ramirez)    History of vitamin D deficiency 4/22/2017     Vitamin D 25-Hydroxy (4/22/2017) = 12.0; Vitamin D 25-Hydroxy (5/26/2017) = 38.7    Infection of vascular bypass graft ([address] 7/24/2017     Left lower extremity    Ischemic cardiomyopathy      Moderate to severe pulmonary hypertension ([address] 7/23/2017    Paroxysmal atrial fibrillation ([address]      Peripheral artery disease ([facility] [address] 1/25/2017    Spinal stenosis of lumbar region with radiculopathy 5/4/2015     Dr. Maribel Melendez     Past Surgical History:   Procedure Laterality Date    CARDIAC CATHETERIZATION   3/8/2017          CARDIAC CATHETERIZATION   3/15/2017          CORONARY STENT SINGLE W/PTCA   3/15/2017          HX ABOVE KNEE AMPUTATION Left 08/18/2017     S/P Left above-the-knee amputation (8/18/2017 - Dr. Helder Martin)    HX ATHERECTOMY Left 06/15/2017     S/P Atherectomy and balloon angioplasty of the left superficial femoral artery and above-knee popliteal artery (6/15/2017 - Dr. Helder Martin)    HX COLONOSCOPY   07/23/2015     polyps repeat 2020 5 years Jacquie Henderson 89 Right 04/04/2017     S/P Right axillary to bifemoral artery bypass using an 8 mm Propaten graft from the right axilla to the right common femoral artery with a jump femoral-femoral bypass with another 8 mm Propaten external ring bypass; Right common femoral artery, and profunda femoris artery and superficial femoral artery endarterectomy causing an extensive surgery on the right side (4/4/2017 - Dr. Peter Addison)   Jeanine Mascorro 89 Right 04/07/2017     S/P Cutdown of femoral-femoral bypass, more on the left groin side; Right lower extremity angiography with first-order catheterization (4/7/2017 - Dr. Janel Mckeon)    HX FEMORAL BYPASS Right 04/10/2017     S/P Right femoral to above-knee popliteal bypass with an 8 mm PTFE graft (4/10/2017 - Dr. David Dyson)    HX OTHER SURGICAL Left 07/25/2017     S/P Removal of infected graft; Repair of left superficial femoral artery; Repair of left common femoral artery (7/25/2017 - Dr. Janel Mckeon)    HX OTHER SURGICAL Right 06/27/2017     S/P Drainage of right side abscess (6/27/2017 - Dr. Janel Mckeon)    HX OTHER SURGICAL Left 07/01/2017     S/P Left groin exploration; Left femoral repair pseudoaneurysm; Left wound washout; Wound VAC placement (7/01/2017 - Dr. Janel Mckeon)    HX OTHER SURGICAL Left 07/04/2017     S/P Left common femoral artery ligation; Jump bypass from right to left fem-fem to a more distal superficial femoral artery using 8 mm external ringed Propaten graft; Left groin wound exploration and washout (7/4/2017 - Dr. Janel Mckeon)    HX OTHER SURGICAL Right 08/18/2017     S/P Right axillary femoral jump bypass interposition; Removal of infected right axillary femoral bypass;  Wound VAC placement of upper thigh 1.2 cm x 5.2 cm x 1.8 cm and groin 4 cm x 0.5 cm x 0.2 cm (8/18/2017 - Dr. Janel Mckeon)     Barriers to Learning/Limitations: yes;  altered mental status (i.e.Sedation, Confusion)  Compensate with: Visual Cues, Verbal Cues and Tactile Cues  Prior Level of Function/Home Situation:  Home Situation  Home Environment: Private residence  # Steps to Enter: 6  Rails to Enter: Yes  Hand Rails : Bilateral  One/Two Story Residence: Two story  # of Interior Steps: [address] Rails: Both  Lift Chair Available: No  Living Alone: No  Support Systems: Family member(s), Child(mile)  Patient Expects to be Discharged to[de-identified] Rehabilitation facility  Current DME Used/Available at Home: Shower chair, Walker, rolling  Critical Behavior:  Neurologic State: Alert;Confused  Psychosocial  Patient Behaviors: Calm  Purposeful Interaction: Yes  Pt Identified Daily Priority: Clinical issues (comment)  Caritas Process: Nurture loving kindness; Teaching/learning  Caring Interventions: Reassure  Reassure: Informing; Acceptance; Instilling dl and hope  Therapeutic Modalities: Intentional therapeutic touch;Humor  Strength:    Strength: Generally decreased, functional (B LE)  Tone & Sensation:   Tone: Normal  Sensation: Intact (B LE)   Range Of Motion:  AROM: Within functional limits (B LE)  Functional Mobility:  Bed Mobility:  Supine to Sit: Minimum assistance  Sit to Supine: Minimum assistance  Scooting: Maximum assistance  Transfers:  Sit to Stand: Moderate assistance;Assist x2  Stand to Sit: Moderate assistance  Balance:   Sitting: Impaired; With support  Sitting - Static: Fair (occasional)  Sitting - Dynamic: Fair (occasional)  Standing: Impaired; With support  Standing - Static: Fair  Standing - Dynamic : Fair  Ambulation/Gait Training:   N/T, unable to complete attempt at hop  Pain:  Pain Scale 1: Numeric (0 - 10)  Pain Intensity 1: 6  Pain Location 1: Flank  Pain Orientation 1: Right  Pain Description 1: Constant  Pain Intervention(s) 1: Medication (see MAR)  Activity Tolerance:   Fair/good  Please refer to the flowsheet for vital signs taken during this treatment.   After treatment:   [ ] Patient left in no apparent distress sitting up in chair  [ ] Patient left sitting on EOB  [X] Patient left in no apparent distress in bed  [ ] Patient declined to be OOB at this time due to  [X] Call bell left within reach  [X] Nursing notified May Hyatt RN)  [ ] Caregiver present  [ ] Bed alarm activated COMMUNICATION/EDUCATION:   [X]         Fall prevention education was provided and the patient/caregiver indicated understanding. [X]         Patient/family have participated as able in goal setting and plan of care. [X]         Patient/family agree to work toward stated goals and plan of care. [ ]         Patient understands intent and goals of therapy, but is neutral about his/her participation. [ ]         Patient is unable to participate in goal setting and plan of care. Thank you for this referral.  Stella De La Vega   Time Calculation: 27 mins      Mobility  Current  CL= 60-79%   Goal  CJ= 20-39%. The severity rating is based on the Level of Assistance required for Functional Mobility and ADLs.      Eval Complexity: History: HIGH Complexity :3+ comorbidities / personal factors will impact the outcome/ POC Exam:MEDIUM Complexity : 3 Standardized tests and measures addressing body structure, function, activity limitation and / or participation in recreation  Presentation: MEDIUM Complexity : Evolving with changing characteristics  Overall Complexity:MEDIUM

## 2017-08-22 NOTE — PROGRESS NOTES
Mental status much improved this AM.   VSS. Labs reviewed. Sitting up in bed talking, in good spirits. No new complaints. Pain seems well controlled. Left AKA incision c/d/i. No bleeding or signs of infection  Right leg with no edema, foot warm to touch, has sensation and can wiggle toes  Left groin wound vac in place. Right lateral ankle wound dressing c/d/i. Right heel appears to be healing  Right chest dressing taken down. Wounds with no drainage. New packing and fresh dressing applied. Continue current treatment. Appreciate Cards/Medicine assistance.    Rehab eval.

## 2017-08-22 NOTE — ROUTINE PROCESS
Bedside and Verbal shift change report given to ROEL Nye RN (oncoming nurse) by RADHA Taylor (offgoing nurse). Report included the following information SBAR, Intake/Output, MAR and Recent Results.

## 2017-08-22 NOTE — PROGRESS NOTES
Problem: Self Care Deficits Care Plan (Adult)  Goal: *Acute Goals and Plan of Care (Insert Text)  Occupational Therapy Goals  Initiated 8/22/2017 within 7 day(s). 1. Patient will perform grooming with supervision/set-up   2. Patient will perform bathing with minimal assistance/contact guard assist.  3. Patient will perform lower body dressing with minimal assistance/contact guard assist.  4. Patient will perform toilet transfers with minimal assistance/contact guard assist.  5. Patient will perform all aspects of toileting with minimal assistance/contact guard assist.  Outcome: Progressing Towards Goal  OCCUPATIONAL THERAPY EVALUATION     Patient: Meredith Knight (41 y.o. male)  Date: 8/22/2017  Primary Diagnosis: Ischemic Rest Pain of Left Lower Extremity  dx  Atherosclerosis of native artery of both lower extremities with intermittent claudication (HCC)  PAD (peripheral artery disease) (HCC)  Infected prosthetic vascular graft, sequela  Procedure(s) (LRB):  removal of right infected graft with jump bypass (Right)  AMPUTATION KNEE(AKA)-left leg (Left) 4 Days Post-Op   Precautions:   Fall, Skin (new L AKA)      ASSESSMENT :  Based on the objective data described below, the patient was in bed upon arrival. Patient has [facility] [place] B[place] AROM with decreased but functional strength. Patient needed min A during bed mobility. Patient has fair static sitting balance with cues for hand placement. Patient needed max A for dynamic sitting balance during donning R sock on EOB. Patient was able to stand with mod A x2 with rolling walker at bedside with fair standing balance. Patient would benefit from intensive acute rehab for strengthening in order to improve functional mobility and participation in self care tasks. Patient will benefit from skilled intervention to address the above impairments.   Patients rehabilitation potential is considered to be Good  Factors which may influence rehabilitation potential include:   [ ] None noted  [ ]             Mental ability/status  [X]             Medical condition  [ ]             Home/family situation and support systems  [ ]             Safety awareness  [ ]             Pain tolerance/management  [ ]             Other:        PLAN :  Recommendations and Planned Interventions:  [X]               Self Care Training                  [X]        Therapeutic Activities  [X]               Functional Mobility Training    [ ]        Cognitive Retraining  [X]               Therapeutic Exercises           [X]        Endurance Activities  [X]               Balance Training                   [ ]        Neuromuscular Re-Education  [ ]               Visual/Perceptual Training     [X]   Home Safety Training  [X]               Patient Education                 [ ]        Family Training/Education  [ ]               Other (comment):     Frequency/Duration: Patient will be followed by occupational therapy 1-2 times per day/4-7 days per week to address goals. Discharge Recommendations: Inpatient Rehab  Further Equipment Recommendations for Discharge: TBD      Barriers to Learning/Limitations: yes;  physical      PATIENT COMPLEXITY      Eval Complexity: History: LOW Complexity : Brief history review ; Examination: MEDIUM Complexity : 3-5 performance deficits relating to physical, cognitive , or psychosocial skils that result in activity limitations and / or participation restrictions; Decision Making:MEDIUM Complexity : Patient may present with comorbidities that affect occupational performnce. Miniml to moderate modification of tasks or assistance (eg, physical or verbal ) with assesment(s) is necessary to enable patient to complete evaluation  Assessment: Low Complexity       G-CODES:      Self Care  Current  CK= 40-59%   Goal  CI= 1-19%. The severity rating is based on the Level of Assistance required for Functional Mobility and ADLs. SUBJECTIVE:   Patient stated This feels weird.  (when standing)      OBJECTIVE DATA SUMMARY:       Past Medical History:   Diagnosis Date    Acute blood loss as cause of postoperative anemia 4/4/2017    Anticoagulated on Coumadin      Aortoiliac occlusive disease (HCC) 1/25/2017    Atherosclerosis of native artery of both lower extremities with intermittent claudication ([address] 1/25/2017    Benign hypertensive heart disease with systolic congestive heart failure, NYHA class 2 ([address] 1/26/2015    Carotid artery disease (HCC)      Chronic anemia      Chronic systolic heart failure (HCC)      Chronic ulcer of right foot ([address] 4/4/2017    Chronic ulcer of right heel ([address] 8/8/2017    Coronary artery disease involving native coronary artery of native heart 3/15/2017     Successful stenting of Cx (Xience LESLEY) and RCA (Xience LESLEY) to 0% by Dr. Aisha Shelton on 3/15/17.     DDD (degenerative disc disease), lumbar 1/26/2015    Dyslipidemia      Erectile dysfunction 7/5/2016    Euthyroid sick syndrome 4/6/2017    Hereditary peripheral neuropathy 11/15/2016    History of cardioversion 4/18/2017     S/P Synchronized external cardioversion (4/18/2017 - Dr. Raegan Terry)    History of vitamin D deficiency 4/22/2017     Vitamin D 25-Hydroxy (4/22/2017) = 12.0; Vitamin D 25-Hydroxy (5/26/2017) = 38.7    Infection of vascular bypass graft ([address] 7/24/2017     Left lower extremity    Ischemic cardiomyopathy      Moderate to severe pulmonary hypertension ([address] 7/23/2017    Paroxysmal atrial fibrillation ([address]      Peripheral artery disease ([address] 1/25/2017    Spinal stenosis of lumbar region with radiculopathy 5/4/2015     Dr. Marian Miller     Past Surgical History:   Procedure Laterality Date    CARDIAC CATHETERIZATION   3/8/2017          CARDIAC CATHETERIZATION   3/15/2017          CORONARY STENT SINGLE W/PTCA   3/15/2017          HX ABOVE KNEE AMPUTATION Left 08/18/2017     S/P Left above-the-knee amputation (8/18/2017 - Dr. Pop Urbina)    HX ATHERECTOMY Left 06/15/2017     S/P Atherectomy and balloon angioplasty of the left superficial femoral artery and above-knee popliteal artery (6/15/2017 - Dr. Cait Garcia)    HX COLONOSCOPY   07/23/2015     polyps repeat 2020 5 years Isidro Henderson 89 Right 04/04/2017     S/P Right axillary to bifemoral artery bypass using an 8 mm Propaten graft from the right axilla to the right common femoral artery with a jump femoral-femoral bypass with another 8 mm Propaten external ring bypass; Right common femoral artery, and profunda femoris artery and superficial femoral artery endarterectomy causing an extensive surgery on the right side (4/4/2017 - Dr. James Pardo)    Ceasar 89 Right 04/07/2017     S/P Cutdown of femoral-femoral bypass, more on the left groin side; Right lower extremity angiography with first-order catheterization (4/7/2017 - Dr. Cait Garcia)    HX FEMORAL BYPASS Right 04/10/2017     S/P Right femoral to above-knee popliteal bypass with an 8 mm PTFE graft (4/10/2017 - Dr. Jazmine Rodriguez)    HX OTHER SURGICAL Left 07/25/2017     S/P Removal of infected graft; Repair of left superficial femoral artery; Repair of left common femoral artery (7/25/2017 - Dr. Cait Garcia)    HX OTHER SURGICAL Right 06/27/2017     S/P Drainage of right side abscess (6/27/2017 - Dr. Cait Garcia)    HX OTHER SURGICAL Left 07/01/2017     S/P Left groin exploration; Left femoral repair pseudoaneurysm; Left wound washout; Wound VAC placement (7/01/2017 - Dr. Cait Garcia)    HX OTHER SURGICAL Left 07/04/2017     S/P Left common femoral artery ligation; Jump bypass from right to left fem-fem to a more distal superficial femoral artery using 8 mm external ringed Propaten graft;  Left groin wound exploration and washout (7/4/2017 - Dr. Cait Garcia)    HX OTHER SURGICAL Right 08/18/2017     S/P Right axillary femoral jump bypass interposition; Removal of infected right axillary femoral bypass; Wound VAC placement of upper thigh 1.2 cm x 5.2 cm x 1.8 cm and groin 4 cm x 0.5 cm x 0.2 cm (8/18/2017 - Dr. Jovan Lama)     Prior Level of Function/Home Situation: Patient reported he was modified independent in basic self care tasks and functional mobility prior to last hospital stay/rehab stay. Home Situation  Home Environment: Private residence  # Steps to Enter: 6  Rails to Enter: Yes  Hand Rails : Bilateral  One/Two Story Residence: Two story  # of Interior Steps: 5  Interior Rails: Both  Lift Chair Available: No  Living Alone: No  Support Systems: Family member(s), Child(mile)  Patient Expects to be Discharged to[de-identified] Rehabilitation facility  Current DME Used/Available at Home: Shower chair, Walker, rolling  Tub or Shower Type: Tub/Shower combination  [X]  Right hand dominant          [ ]  Left hand dominant  Cognitive/Behavioral Status:  Neurologic State: Alert  Orientation Level: Oriented X4 (occasional confusion )  Cognition: Follows commands  Safety/Judgement: Awareness of environment; Fall prevention; Insight into deficits     Skin: No skin changes noted     Edema: No edema noted     Vision/Perceptual:       Acuity: Within Defined Limits       Coordination:  Coordination: Within functional limits (BUEs)       Balance:  Sitting: Impaired; With support  Sitting - Static: Fair (occasional)  Sitting - Dynamic: Poor (constant support)  Standing: Impaired; With support (rolling walker)  Standing - Static: Fair  Standing - Dynamic : Poor     Strength:  Strength: Generally decreased, functional (BUEs)     Tone & Sensation:  Tone: Normal (BUEs)  Sensation: Intact (BUEs)     Range of Motion:  AROM: Within functional limits (BUEs)     Functional Mobility and Transfers for ADLs:  Bed Mobility:  Supine to Sit: Minimum assistance  Sit to Supine: Minimum assistance  Scooting: Maximum assistance  Transfers:  Sit to Stand:  Moderate assistance;Assist x2     ADL Assessment:(clinical judgement based on functional mobility)  Feeding: Independent     Oral Facial Hygiene/Grooming: Moderate assistance     Bathing: Maximum assistance     Upper Body Dressing: Minimum assistance     Lower Body Dressing: Maximum assistance     Toileting: Maximum assistance      Pain:  Pt reports 5/10 pain or discomfort prior to treatment. Pt reports 5/10 pain or discomfort post treatment. Activity Tolerance:  Fair     Please refer to the flowsheet for vital signs taken during this treatment. After treatment:   [ ] Patient left in no apparent distress sitting up in chair  [X] Patient left in no apparent distress in bed  [X] Call bell left within reach  [X] Nursing notified  [ ] Caregiver present  [ ] Bed alarm activated      COMMUNICATION/EDUCATION:   [ ] Home safety education was provided and the patient/caregiver indicated understanding. [X] Patient/family have participated as able in goal setting and plan of care. [X] Patient/family agree to work toward stated goals and plan of care. [ ] Patient understands intent and goals of therapy, but is neutral about his/her participation. [ ] Patient is unable to participate in goal setting and plan of care.      Thank you for this referral.  Aby Capps, OTR/L  Time Calculation: 27 mins

## 2017-08-22 NOTE — ROUTINE PROCESS
0730 Report received from off going shift initial assessment completed. Denies pain and discomfort. Slightly confused. 0800 PA wild at bedside and rt lat chest dsg changed as directed. 1000vss, Dr Gayle Ek in to see with orders written. 1100 PT in and assisted up to standing position. lubna poorly returned to bed, lubna well. 1200 vss, SBAR report given to [employer].

## 2017-08-22 NOTE — PROGRESS NOTES
NUTRITION    Nutrition Consult: General Nutrition Management & Supplements     RECOMMENDATIONS / PLAN:     - Continue diet as toleated. - Change nutritional supplments to Ensure Enlive BID  - Continue RD inpatient monitoring and evaluation. NUTRITION INTERVENTIONS & DIAGNOSIS:     [x] Meals/Snacks: modified diet  [x] Medical food supplementation: modify     Nutrition Diagnosis: Increased nutrient needs (protein) related to wound healing as evidenced by wound vac to left groin. ASSESSMENT:     Pt known from previous admissions. 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 Poor intake until today, now reports having a good appetite, consumed 100% of breakfast. Dislikes Ensure Clear, but likes Ensure Enlive, will change.      Average po intake adequate to meet patients estimated nutritional needs:   [] Yes     [x] No   [] Unable to determine at this time    Diet: DIET CARDIAC Regular  DIET NUTRITIONAL SUPPLEMENTS All Meals; ENSURE CLEAR      Food Allergies: NKFA  Current Appetite:   [x] Good     [] Fair     [x] Poor: prior to today     [] Other:  Appetite/meal intake prior to admission:   [x] Good     [] Fair     [] Poor     [] Other:  Feeding Limitations:  [] Swallowing difficulty    [] Chewing difficulty    [] Other:  Current Meal Intake: Patient Vitals for the past 100 hrs:   % Diet Eaten   08/22/17 0909 100 %   08/21/17 1700 10 %   08/21/17 1220 10 %   08/20/17 1823 0 %   08/20/17 1200 0 %   08/20/17 0912 5 %   08/20/17 0430 0 %   08/20/17 0030 0 %   08/19/17 2056 0 %   08/19/17 0929 85 %   08/19/17 0400 0 %   08/19/17 0030 0 %   08/18/17 2000 0 %       BM:  8/17   Skin Integrity: surgical incision to left groin with wound vac, surgical incision to chest and right hip, vascular wound to right ankle   Edema: none  Pertinent Medications: Reviewed    Recent Labs      08/22/17   0550  08/21/17   0549  08/20/17   0422   NA  134*  138  132*   K  4.1  3.4*  3.8   CL  101  104  101   CO2  25  25  24   GLU  103*  112*  106*   BUN  7  8  9 CREA  0.55*  0.56*  0.55*   CA  8.1*  8.5  8.4*   MG   --    --   2.2   PHOS   --    --   2.8   ALB  2.3*  2.3*   --    SGOT  62*  58*   --    ALT  18  13*   --        Intake/Output Summary (Last 24 hours) at 08/22/17 1332  Last data filed at 08/22/17 1300   Gross per 24 hour   Intake             3000 ml   Output             1150 ml   Net             1850 ml       Anthropometrics:  Ht Readings from Last 1 Encounters:   07/24/17 5' 10\" (1.778 m)     Last 3 Recorded Weights in this Encounter    08/19/17 2216 08/21/17 0217 08/22/17 0321   Weight: 62.3 kg (137 lb 5.6 oz) 63.1 kg (139 lb 1.8 oz) 67.5 kg (148 lb 13 oz)     Body mass index is 21.35 kg/(m^2). Adjusted IBW: 67 kg    Weight History: Denies weight changes PTA. Weight Metrics 8/22/2017 8/17/2017 8/7/2017 8/5/2017 7/24/2017 7/17/2017 7/12/2017   Weight 148 lb 13 oz - 165 lb 5.5 oz 165 lb 2 oz - 156 lb 156 lb 14.4 oz   BMI - 21.35 kg/m2 23.72 kg/m2 - 23.69 kg/m2 22.38 kg/m2 -        Admitting Diagnosis: Ischemic Rest Pain of Left Lower Extremity  dx  Atherosclerosis of native artery of both lower extremities with intermittent claudication (HCC)  PAD (peripheral artery disease) (HCC)  Infected prosthetic vascular graft, sequela  Pertinent PMHx: atherosclerosis, CHF    Education Needs:        [x] None identified  [] Identified - Not appropriate at this time  []  Identified and addressed - refer to education log  Learning Limitations:   [x] None identified  [] Identified    Cultural, Evangelical & ethnic food preferences:  [x] None identified    [] Identified and addressed     ESTIMATED NUTRITION NEEDS:     Calories: 4225-4872 kcal (MSJx1.2-1.3) based on  [x] Actual BW  68 kg    [] IBW   Protein: 81-88 gm (1.2-1.3 gm/kg) based on  [x] Actual BW      [] IBW   Fluid: 1 mL/kcal     MONITORING & EVALUATION:     Nutrition Goal(s):   1. Po intake of meals will meet >75% of patient estimated nutritional needs within the next 7 days.   Outcome:  [] Met/Ongoing    [] Not Met    [x] New/Initial Goal     Monitoring:   [x] Diet tolerance   [x] Meal intake   [x] Supplement intake   [] GI symptoms/ability to tolerate po diet   [] Respiratory status   [] Plan of care      Previous Recommendations (for follow-up assessments only):     []   Implemented       []   Not Implemented (RD to address)     [] No Recommendation Made     Discharge Planning: cardiac diet   [x] Participated in care planning, discharge planning, & interdisciplinary rounds as appropriate      Naty Montoya, 64 [address]    Pager: [phone]

## 2017-08-22 NOTE — PROGRESS NOTES
[facility] - Progress Note  Admit Date: 8/17/2017    Assessment:     Hospital Problems  Date Reviewed: 8/18/2017          Codes Class Noted POA    Acute metabolic encephalopathy [id number]: G93.41  ICD-9-CM: 348.31  8/21/2017 No        Prolonged Q-T interval on ECG ICD-10-CM: R94.31  ICD-9-CM: 794.31  8/19/2017 Yes        Elevated lactic acid level ICD-10-CM: R79.89  ICD-9-CM: 276.2  8/19/2017 No        Leukocytosis ICD-10-CM: D72.829  ICD-9-CM: 288.60  8/19/2017 No        Adverse effect of amiodarone ICD-10-CM: T46.2X5A  ICD-9-CM: E942.0  8/19/2017 Yes        Status post above knee amputation of left lower extremity ([facility] [address] ICD-10-CM: J52.832  ICD-9-CM: V49.76  8/18/2017 No    Overview Signed 8/21/2017 10:21 PM by Mallory Sheridan MD     S/P Left above-the-knee amputation (8/18/2017 - Dr. Audrey Zaidi)               Aftercare for amputation stump ICD-10-CM: Z47.81  ICD-9-CM: V54.89  8/18/2017 No    Overview Signed 8/21/2017 10:25 PM by Mallory Sheridan MD     S/P Left above-the-knee amputation (8/18/2017 - Dr. Audrey Zaidi)             * (Principal)Ischemic rest pain of lower extremity [facility]) ICD-10-CM: I73.9  ICD-9-CM: 443.9  8/14/2017 Yes    Overview Signed 8/15/2017 11:09 AM by Mallory Sheridan MD     Left             Hyponatremia ICD-10-CM: E87.1  ICD-9-CM: 276.1  8/8/2017 Yes        Acute blood loss as cause of postoperative anemia ICD-10-CM: D62  ICD-9-CM: 285.1  7/25/2017 Yes        Aftercare following surgery of the circulatory system ICD-10-CM: E01.938  ICD-9-CM: V58.73  7/25/2017 Yes    Overview Addendum 8/21/2017 10:22 PM by Mallory Sheridan MD     S/P Removal of infected graft; Repair of left superficial femoral artery; Repair of left common femoral artery (7/25/2017 - Dr. Audrey Zaidi); S/P Right axillary femoral jump bypass interposition; Removal of infected right axillary femoral bypass;  Wound VAC placement of upper thigh 1.2 cm x 5.2 cm x 1.8 cm and groin 4 cm x 0.5 cm x 0.2 cm (8/18/2017 - Dr. Catherine Vaz)             Impaired mobility and ADLs ICD-10-CM: Z74.09  ICD-9-CM: 799.89  7/24/2017 Yes        Infection of vascular bypass graft [facility]) ICD-10-CM: T82. 7XXA  ICD-9-CM: 996.62  7/24/2017 Yes    Overview Signed 8/7/2017  2:19 PM by Funmi Titus MD     Left lower extremity             Sepsis associated with internal vascular access [facility]) ICD-10-CM: T82. 7XXA, A41.9  ICD-9-CM: 996.62, 038.9  7/24/2017 Yes        Ischemic cardiomyopathy (Chronic) ICD-10-CM: I25.5  ICD-9-CM: 414.8  Unknown Yes        Chronic systolic heart failure (HCC) (Chronic) ICD-10-CM: I50.22  ICD-9-CM: 428.22  Unknown Yes        Paroxysmal atrial fibrillation (HCC) ICD-10-CM: I48.0  ICD-9-CM: 427.31  Unknown Yes        Coronary artery disease involving native coronary artery of native heart (Chronic) ICD-10-CM: I25.10  ICD-9-CM: 414.01  3/15/2017 Yes    Overview Signed 3/15/2017  2:19 PM by JUNE Kline     Successful stenting of Cx (Xience LESLEY) and RCA (Xience LESLEY) to 0% by Dr. Nancy Anne on 3/15/17. Atherosclerosis of native artery of both lower extremities with intermittent claudication (HCC) (Chronic) ICD-10-CM: H33.025  ICD-9-CM: 440.21  1/25/2017 Yes        Peripheral artery disease (HCC) (Chronic) ICD-10-CM: I73.9  ICD-9-CM: 443.9  1/25/2017 Yes        Benign hypertensive heart disease with systolic congestive heart failure, NYHA class 2 (HCC) (Chronic) ICD-10-CM: I11.0, I50.20  ICD-9-CM: 402.11, 428.20, 428.0  1/26/2015 Yes              -Prolonged QTc in setting of amiodarone and medications, 537 msec by EKG 8/20/17, 622 msec on 8/19/17.   QTc 457msec 8/21/2017.  -PAD, s/p removal of infected graft, repair of superficial femoral artery 7/25/17 by Dr. Chrissie Cranker, ultimately requiring amputation 8/18/17  -h/o sepsis  -Hyponatremia  -Acute metabolic encephalopathy, multifactorial  -Post-op anemia requiring transfusion  -h/o paroxysmal atrial fibrillation.  Cardioversion April 2017 and on amiodarone.  Previously on Coumadin. -CAD, s/p PCI with LESLEY to RCA/LCx 3/15/17  -Chronic diastolic heart failure on lasix 20 mg  -h/o cardiomyopathy with EF 40% by echo April 2017.  Follow-up [facility] at [facility] on 7/23/17with EF 50%, pulmonary pressure 67 mmHg, dilated RV. -Spinal stenosis  -Dyslipidemia  -HTN      Primary cardiologist Dr. Terry Gomez:     Follow up ECG pending for today. Continue telemetry monitoring. Continued on amio. Hemodynamics stable. Continued on BB, cozaar. Volume stable on PO lasix. Subjective:     Awake, denies CP, palp, SOB, no tele events.     Objective:      Patient Vitals for the past 8 hrs:   Temp Pulse Resp BP SpO2   08/22/17 1214 - 72 27 155/81 100 %   08/22/17 1114 - 68 30 141/60 100 %   08/22/17 1014 - 70 22 132/54 100 %   08/22/17 0914 - 68 26 150/70 100 %   08/22/17 0814 97.4 °F (36.3 °C) 66 28 140/76 97 %   08/22/17 0714 - 70 25 141/77 98 %   08/22/17 0614 - 76 20 143/71 93 %   08/22/17 0515 - 76 22 141/69 100 %         Patient Vitals for the past 96 hrs:   Weight   08/22/17 0321 67.5 kg (148 lb 13 oz)   08/21/17 0217 63.1 kg (139 lb 1.8 oz)   08/19/17 2216 62.3 kg (137 lb 5.6 oz)                    Intake/Output Summary (Last 24 hours) at 08/22/17 1244  Last data filed at 08/22/17 1207   Gross per 24 hour   Intake             3000 ml   Output             1275 ml   Net             1725 ml       Physical Exam:  General:  alert, cooperative  Neck:  no JVD  Lungs:  clear to auscultation bilaterally  Heart:  regular rate and rhythm  Abdomen:  abdomen is soft   Extremities:  edema no    Data Review:     Labs: Results:       Chemistry Recent Labs      08/22/17   0550  08/21/17   0549  08/20/17   0422   GLU  103*  112*  106*   NA  134*  138  132*   K  4.1  3.4*  3.8   CL  101  104  101   CO2  25  25  24   BUN  7  8  9   CREA  0.55*  0.56*  0.55*   CA  8.1*  8.5  8.4*   MG   --    --   2.2   PHOS   --    --   2.8   AGAP  8  9  7   BUCR  13  14  16   AP  734*  387*   -- TP  6.7  7.4   --    ALB  2.3*  2.3*   --    GLOB  4.4*  5.1*   --    AGRAT  0.5*  0.5*   --       CBC w/Diff Recent Labs      08/22/17   0550  08/21/17   0549  08/20/17   0830  08/20/17   0422   WBC  14.5*  14.8*   --   18.2*   RBC  2.65*  2.64*   --   2.60*   HGB  7.6*  7.5*  7.9*  7.4*   HCT  23.1*  22.9*  23.7*  22.6*   PLT  513*  491*   --   468*   GRANS  78*  73   --   80*   LYMPH  12*  11*   --   4*   EOS  1  2   --   0      Cardiac Enzymes Lab Results   Component Value Date/Time     (H) 08/22/2017 05:50 AM      Coagulation No results for input(s): PTP, INR, APTT in the last 72 hours.     No lab exists for component: INREXT    Lipid Panel Lab Results   Component Value Date/Time    Cholesterol, total 157 12/29/2015 07:18 AM    HDL Cholesterol 28 12/29/2015 07:18 AM    LDL, calculated 112.4 12/29/2015 07:18 AM    VLDL, calculated 16.6 12/29/2015 07:18 AM    Triglyceride 83 12/29/2015 07:18 AM    CHOL/HDL Ratio 5.6 12/29/2015 07:18 AM      BNP No results found for: BNP, BNPP, XBNPT   Liver Enzymes Recent Labs      08/22/17   0550   TP  6.7   ALB  2.3*   AP  734*   SGOT  62*      Digoxin    Thyroid Studies Lab Results   Component Value Date/Time    TSH 6.74 08/02/2017 05:41 AM          Signed By: JUNE Santos     August 22, 2017

## 2017-08-22 NOTE — PROGRESS NOTES
[facility]   ADULT PROTOCOL: JET AEROSOL ASSESSMENT    Patient  Meliton Fox     59 y.o.   male     8/22/2017  3:59 PM    Breath Sounds Pre Procedure:  Breath Sounds Bilateral: Clear, Diminished                                            Breath Sounds Post Procedure: Breath Sounds Bilateral: Clear, Diminished                                               Breathing pattern: Pre procedure  Breathing Pattern: Regular          Post procedure  Breathing Pattern: Regular    Cough: Pre procedure  Cough: Non-productive               Post procedure Cough: Non-productive    Heart Rate: Pre procedure Pulse: 72           Post procedure Pulse: 72    Resp Rate: Pre procedure  Respirations: 20           Post procedure          Nebulizer Therapy: Current medications Aerosolized Medications: Albuterol       Problem List:   Patient Active Problem List   Diagnosis Code    Benign hypertensive heart disease with systolic congestive heart failure, NYHA class 2 (HCC) I11.0, I50.20    DDD (degenerative disc disease), lumbar M51.36    Erectile dysfunction N52.9    Spinal stenosis of lumbar region with radiculopathy M48.06, M54.16    Hereditary peripheral neuropathy G60.9    Aortoiliac occlusive disease ([facility] [address] I74.09    Atherosclerosis of native artery of both lower extremities with intermittent claudication ([facility] [address] I70.213    Peripheral artery disease ([facility] [address] I73.9    Coronary artery disease involving native coronary artery of native heart I25.10    Paroxysmal atrial fibrillation (HCC) I48.0    Acute blood loss as cause of postoperative anemia D62    Impaired mobility and ADLs Z74.09    Anticoagulated on Coumadin Z51.81, Z79.01    Ischemic cardiomyopathy I25.5    Chronic systolic heart failure (HCC) I50.22    Carotid artery disease (HCC) I77.9    Hyperammonemia (HCC) E72.20    Dyslipidemia E78.5    Euthyroid sick syndrome E07.81    Aftercare following surgery of the circulatory system Z48.812    Status post femoral-popliteal bypass surgery Z95.828    History of cardioversion Z98.890    Critical ischemia of lower extremity I99.8    History of vitamin D deficiency Z86.39    Pseudoaneurysm of femoral artery (HCC) 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 7XXA    Chronic anemia D64.9    Sepsis associated with internal vascular access ([facility] [address] T82. 7XXA, A41.9    Chronic ulcer of right heel ([facility] [address] L97.419    Ischemic rest pain of lower extremity (HCC) I73.9    Prolonged Q-T interval on ECG R94.31    Acute metabolic encephalopathy Q10.46    Status post above knee amputation of left lower extremity ([facility] [address] X70.758    Aftercare for amputation stump Z47.81    Hyponatremia E87.1    Elevated lactic acid level R79.89    Leukocytosis D72.829    Moderate to severe pulmonary hypertension (HCC) I27.2    Adverse effect of amiodarone T46.2X5A       Patient alert and cooperative to use MDI: Yes    Home Respiratory Therapy Regimen/Frequency:  NO  Medication   Device  Frequency     SEVERITY INDEX:    ITEM 0 1 2 3 4 Score   Respiratory Pattern and or Rate Regular  10-19 Regular  20-24   24-30    30-34 Severe SOB or   Greater than 35 0   Breath Sounds Clear Occasional Wheeze Mild Wheezing Moderate Wheezing  wheezing/Absent breath sounds 0   Shortness of Breath None Dyspnea on Exertion Dyspnea at Rest Moderate Shortness of Breath at Rest Severe Shortness of Breath - Limited Speech 0       Total Score:  0    * Scoring Guidelines  0-4 pts:  PRN-BID   5-7 pts:  BID, TID, QID  8-9 pts:  TID, QID, Q6  10-12 pts:  Q4-Q6  * - Guidelines used with clinical judgement. PRN Treatments can be ordered to supplement scheduled treatments. Regardless of score, frequency should not be less than normal home regimen.     Recommended Order/Frequency:  Q6 PRN    Comments:          Respiratory Therapist: Lori Pinto RT

## 2017-08-23 LAB
ALBUMIN SERPL-MCNC: 2.1 G/DL (ref 3.4–5)
ALBUMIN/GLOB SERPL: 0.4 {RATIO} (ref 0.8–1.7)
ALP SERPL-CCNC: 718 U/L (ref 45–117)
ALT SERPL-CCNC: 23 U/L (ref 16–61)
ANION GAP SERPL CALC-SCNC: 6 MMOL/L (ref 3–18)
AST SERPL-CCNC: 54 U/L (ref 15–37)
BACTERIA SPEC CULT: NORMAL
BASOPHILS # BLD: 0 K/UL (ref 0–0.1)
BASOPHILS NFR BLD: 0 % (ref 0–2)
BILIRUB SERPL-MCNC: 0.7 MG/DL (ref 0.2–1)
BUN SERPL-MCNC: 6 MG/DL (ref 7–18)
BUN/CREAT SERPL: 12 (ref 12–20)
CALCIUM SERPL-MCNC: 8.7 MG/DL (ref 8.5–10.1)
CHLORIDE SERPL-SCNC: 101 MMOL/L (ref 100–108)
CO2 SERPL-SCNC: 28 MMOL/L (ref 21–32)
CREAT SERPL-MCNC: 0.5 MG/DL (ref 0.6–1.3)
DIFFERENTIAL METHOD BLD: ABNORMAL
EOSINOPHIL # BLD: 0.2 K/UL (ref 0–0.4)
EOSINOPHIL NFR BLD: 1 % (ref 0–5)
ERYTHROCYTE [DISTWIDTH] IN BLOOD BY AUTOMATED COUNT: 17.4 % (ref 11.6–14.5)
GLOBULIN SER CALC-MCNC: 4.8 G/DL (ref 2–4)
GLUCOSE SERPL-MCNC: 93 MG/DL (ref 74–99)
HCT VFR BLD AUTO: 24.6 % (ref 36–48)
HGB BLD-MCNC: 8.1 G/DL (ref 13–16)
LYMPHOCYTES # BLD: 1.7 K/UL (ref 0.9–3.6)
LYMPHOCYTES NFR BLD: 14 % (ref 21–52)
MAGNESIUM SERPL-MCNC: 1.7 MG/DL (ref 1.6–2.6)
MCH RBC QN AUTO: 28.5 PG (ref 24–34)
MCHC RBC AUTO-ENTMCNC: 32.9 G/DL (ref 31–37)
MCV RBC AUTO: 86.6 FL (ref 74–97)
MONOCYTES # BLD: 1.3 K/UL (ref 0.05–1.2)
MONOCYTES NFR BLD: 11 % (ref 3–10)
NEUTS SEG # BLD: 8.5 K/UL (ref 1.8–8)
NEUTS SEG NFR BLD: 74 % (ref 40–73)
PLATELET # BLD AUTO: 567 K/UL (ref 135–420)
PMV BLD AUTO: 8.9 FL (ref 9.2–11.8)
POTASSIUM SERPL-SCNC: 3.5 MMOL/L (ref 3.5–5.5)
PROT SERPL-MCNC: 6.9 G/DL (ref 6.4–8.2)
RBC # BLD AUTO: 2.84 M/UL (ref 4.7–5.5)
SERVICE CMNT-IMP: NORMAL
SODIUM SERPL-SCNC: 135 MMOL/L (ref 136–145)
WBC # BLD AUTO: 11.6 K/UL (ref 4.6–13.2)

## 2017-08-23 PROCEDURE — 74011000250 HC RX REV CODE- 250: Performed by: INTERNAL MEDICINE

## 2017-08-23 PROCEDURE — 74011250636 HC RX REV CODE- 250/636: Performed by: INTERNAL MEDICINE

## 2017-08-23 PROCEDURE — 97530 THERAPEUTIC ACTIVITIES: CPT

## 2017-08-23 PROCEDURE — 36415 COLL VENOUS BLD VENIPUNCTURE: CPT | Performed by: INTERNAL MEDICINE

## 2017-08-23 PROCEDURE — 85025 COMPLETE CBC W/AUTO DIFF WBC: CPT | Performed by: INTERNAL MEDICINE

## 2017-08-23 PROCEDURE — 74011250637 HC RX REV CODE- 250/637: Performed by: SURGERY

## 2017-08-23 PROCEDURE — 83735 ASSAY OF MAGNESIUM: CPT | Performed by: INTERNAL MEDICINE

## 2017-08-23 PROCEDURE — 74011000258 HC RX REV CODE- 258: Performed by: INTERNAL MEDICINE

## 2017-08-23 PROCEDURE — 74011250636 HC RX REV CODE- 250/636: Performed by: SURGERY

## 2017-08-23 PROCEDURE — 97110 THERAPEUTIC EXERCISES: CPT

## 2017-08-23 PROCEDURE — 77030019934 HC DRSG VAC ASST KCON -B

## 2017-08-23 PROCEDURE — 74011250637 HC RX REV CODE- 250/637: Performed by: INTERNAL MEDICINE

## 2017-08-23 PROCEDURE — 80053 COMPREHEN METABOLIC PANEL: CPT | Performed by: INTERNAL MEDICINE

## 2017-08-23 PROCEDURE — 65270000029 HC RM PRIVATE

## 2017-08-23 PROCEDURE — 77030019952 HC CANSTR VAC ASST KCON -B

## 2017-08-23 RX ORDER — POTASSIUM CHLORIDE 20 MEQ/1
20 TABLET, EXTENDED RELEASE ORAL DAILY
Status: DISCONTINUED | OUTPATIENT
Start: 2017-08-23 | End: 2017-08-25 | Stop reason: HOSPADM

## 2017-08-23 RX ORDER — HYDROMORPHONE HYDROCHLORIDE 2 MG/1
2-4 TABLET ORAL
Status: DISCONTINUED | OUTPATIENT
Start: 2017-08-23 | End: 2017-08-25 | Stop reason: HOSPADM

## 2017-08-23 RX ORDER — HYDROMORPHONE HYDROCHLORIDE 2 MG/1
2 TABLET ORAL
Status: DISCONTINUED | OUTPATIENT
Start: 2017-08-23 | End: 2017-08-23

## 2017-08-23 RX ORDER — MAGNESIUM SULFATE 1 G/100ML
1 INJECTION INTRAVENOUS ONCE
Status: COMPLETED | OUTPATIENT
Start: 2017-08-23 | End: 2017-08-23

## 2017-08-23 RX ADMIN — Medication 5 ML: at 14:00

## 2017-08-23 RX ADMIN — AMPICILLIN AND SULBACTAM 3 G: 1; 2 INJECTION, POWDER, FOR SOLUTION INTRAMUSCULAR; INTRAVENOUS at 23:26

## 2017-08-23 RX ADMIN — ZINC SULFATE 220 MG (50 MG) CAPSULE 220 MG: CAPSULE at 08:12

## 2017-08-23 RX ADMIN — IRON SUCROSE 200 MG: 20 INJECTION, SOLUTION INTRAVENOUS at 21:33

## 2017-08-23 RX ADMIN — LIDOCAINE HYDROCHLORIDE: 10 INJECTION, SOLUTION EPIDURAL; INFILTRATION; INTRACAUDAL; PERINEURAL at 22:12

## 2017-08-23 RX ADMIN — HYDROMORPHONE HYDROCHLORIDE 1 MG: 1 INJECTION, SOLUTION INTRAMUSCULAR; INTRAVENOUS; SUBCUTANEOUS at 04:21

## 2017-08-23 RX ADMIN — GABAPENTIN 300 MG: 300 CAPSULE ORAL at 21:38

## 2017-08-23 RX ADMIN — Medication 10 ML: at 22:18

## 2017-08-23 RX ADMIN — HYDROMORPHONE HYDROCHLORIDE 4 MG: 2 TABLET ORAL at 20:08

## 2017-08-23 RX ADMIN — AMPICILLIN AND SULBACTAM 3 G: 1; 2 INJECTION, POWDER, FOR SOLUTION INTRAMUSCULAR; INTRAVENOUS at 15:56

## 2017-08-23 RX ADMIN — ASPIRIN 81 MG 81 MG: 81 TABLET ORAL at 08:13

## 2017-08-23 RX ADMIN — LOSARTAN POTASSIUM 50 MG: 50 TABLET, FILM COATED ORAL at 08:12

## 2017-08-23 RX ADMIN — ATORVASTATIN CALCIUM 40 MG: 40 TABLET, FILM COATED ORAL at 20:08

## 2017-08-23 RX ADMIN — METOPROLOL TARTRATE 25 MG: 25 TABLET ORAL at 20:08

## 2017-08-23 RX ADMIN — AMIODARONE HYDROCHLORIDE 200 MG: 200 TABLET ORAL at 08:11

## 2017-08-23 RX ADMIN — Medication 325 MG: at 08:12

## 2017-08-23 RX ADMIN — GABAPENTIN 300 MG: 300 CAPSULE ORAL at 15:56

## 2017-08-23 RX ADMIN — HYDROMORPHONE HYDROCHLORIDE 1 MG: 1 INJECTION, SOLUTION INTRAMUSCULAR; INTRAVENOUS; SUBCUTANEOUS at 09:31

## 2017-08-23 RX ADMIN — AMPICILLIN AND SULBACTAM 3 G: 1; 2 INJECTION, POWDER, FOR SOLUTION INTRAMUSCULAR; INTRAVENOUS at 09:05

## 2017-08-23 RX ADMIN — Medication 400 MG: at 08:13

## 2017-08-23 RX ADMIN — HYDROMORPHONE HYDROCHLORIDE 1 MG: 1 INJECTION, SOLUTION INTRAMUSCULAR; INTRAVENOUS; SUBCUTANEOUS at 00:37

## 2017-08-23 RX ADMIN — METOPROLOL TARTRATE 25 MG: 25 TABLET ORAL at 08:13

## 2017-08-23 RX ADMIN — VITAMIN D, TAB 1000IU (100/BT) 1000 UNITS: 25 TAB at 08:13

## 2017-08-23 RX ADMIN — POTASSIUM CHLORIDE 20 MEQ: 20 TABLET, EXTENDED RELEASE ORAL at 08:32

## 2017-08-23 RX ADMIN — FUROSEMIDE 20 MG: 20 TABLET ORAL at 08:12

## 2017-08-23 RX ADMIN — POLYETHYLENE GLYCOL 3350 17 G: 17 POWDER, FOR SOLUTION ORAL at 08:11

## 2017-08-23 RX ADMIN — HYDROMORPHONE HYDROCHLORIDE 2 MG: 2 TABLET ORAL at 08:12

## 2017-08-23 RX ADMIN — HEPARIN SODIUM 5000 UNITS: 5000 INJECTION, SOLUTION INTRAVENOUS; SUBCUTANEOUS at 20:09

## 2017-08-23 RX ADMIN — CYANOCOBALAMIN TAB 1000 MCG 1000 MCG: 1000 TAB at 08:12

## 2017-08-23 RX ADMIN — DOCUSATE SODIUM 100 MG: 100 CAPSULE, LIQUID FILLED ORAL at 08:11

## 2017-08-23 RX ADMIN — AMPICILLIN AND SULBACTAM 3 G: 1; 2 INJECTION, POWDER, FOR SOLUTION INTRAMUSCULAR; INTRAVENOUS at 04:22

## 2017-08-23 RX ADMIN — HEPARIN SODIUM 5000 UNITS: 5000 INJECTION, SOLUTION INTRAVENOUS; SUBCUTANEOUS at 04:22

## 2017-08-23 RX ADMIN — Medication 250 MG: at 08:13

## 2017-08-23 RX ADMIN — HYDROMORPHONE HYDROCHLORIDE 2 MG: 2 TABLET ORAL at 15:56

## 2017-08-23 RX ADMIN — HEPARIN SODIUM 5000 UNITS: 5000 INJECTION, SOLUTION INTRAVENOUS; SUBCUTANEOUS at 15:56

## 2017-08-23 RX ADMIN — GABAPENTIN 300 MG: 300 CAPSULE ORAL at 07:28

## 2017-08-23 RX ADMIN — Medication 1 CAPSULE: at 08:13

## 2017-08-23 RX ADMIN — MAGNESIUM SULFATE HEPTAHYDRATE 1 G: 1 INJECTION, SOLUTION INTRAVENOUS at 17:30

## 2017-08-23 NOTE — PROGRESS NOTES
Pt sitting up in bed this morning  No complaints  Affect appropriate  Due for vac change today  Awaiting transfer to stepdown  PT/OT king

## 2017-08-23 NOTE — ROUTINE PROCESS
TRANSFER - OUT REPORT:    Verbal report given to Mary Ortega RN (name) on Dorothea Allen  being transferred to [address] [place] [address](unit) for routine progression of care       Report consisted of patients Situation, Background, Assessment and   Recommendations(SBAR). Information from the following report(s) SBAR, MAR and Recent Results was reviewed with the receiving nurse. Lines:   Peripheral IV 08/20/17 Right Hand (Active)   Site Assessment Clean, dry, & intact 8/22/2017  8:00 PM   Phlebitis Assessment 0 8/22/2017  8:00 PM   Infiltration Assessment 0 8/22/2017  8:00 PM   Dressing Status Clean, dry, & intact 8/22/2017  8:00 PM   Dressing Type Transparent 8/22/2017  8:00 PM   Hub Color/Line Status Infusing;Blue 8/22/2017  8:00 PM        Opportunity for questions and clarification was provided.       Patient transported with:  Transport and belongings-- including dentures and cell phone

## 2017-08-23 NOTE — PROGRESS NOTES
Infectious Disease progress Note    Requested by: dr. Sandra Haywood    Reason: infected right axillary femoral bypass graft    Current abx Prior abx   Pip/tazo, vancomycin since 8/17/17      Lines:       Assessment :    Hebert Hartman a 59 y. o. male with CAD, severe peripheral arterial insufficiency admitted to [facility] on 8/17/17.      Recent hospitalization in 7/2017 for sepsis due to MSSA bloodstream infection (positive blood cultures 7/22, 7/24; negative blood cultures 7/28). Most likely source of MSSA bacteremia is infected left groin vascular graft. Patient appropriately underwent removal of infected graft, Repair of superficial femoral artery, left side; Repair of common femoral artery on 7/25/17. Intra op cx: MSSA. Clinical presentation consistent with acute LLE ischemia, infected right axillary femoral bypass graft. Patient has been appropriately managed with left AKA and excision of the infected right axillary femoral bypass graft on 8/18/17. Intra op cultures negative likely due to prior cefazolin.      Clinically better     Recommendations:     1. cont iv ampicillin/sulbactam till 9/19/17  2. F/u vascular surgery recommendations  3. Will need PICC line for outpt iv abx    Advance Care planning: full code: discussed  with patient/surrogate decision maker: Sherrell Pederson: [phone]       Above plan was discussed in details with patient,  and vascular team.  Please call me if any further questions or concerns. Will continue to participate in the care of this patient. subjective:    Patient complains of some pain at the recent surgical site left groin. Patient denies headaches, visual disturbances, sore throat, runny nose, earaches, cp, sob, chills, cough, abdominal pain, diarrhea, burning micturition,  or weakness in extremities. He denies back pain/flank pain.             home Medication List    Details   gabapentin (NEURONTIN) 300 mg capsule Take 1 Cap by mouth every eight (8) hours.  Indications: NEUROPATHIC PAIN  Qty: 45 Cap, Refills: 0    Associated Diagnoses: Hereditary peripheral neuropathy      lactobacillus sp. 50 billion cpu (BIO-K PLUS) 50 billion cell -375 mg cap capsule Take 1 Cap by mouth daily. Qty: 15 Cap, Refills: 0    Associated Diagnoses: Infection of vascular bypass graft, subsequent encounter      losartan (COZAAR) 50 mg tablet Take 1 Tab by mouth daily. Indications: Chronic Heart Failure, hypertension  Qty: 15 Tab, Refills: 0    Associated Diagnoses: Benign hypertensive heart disease with systolic congestive heart failure, NYHA class 2 (HCC)      magnesium oxide (MAG-OX) 400 mg tablet Take 1 Tab by mouth daily. Qty: 15 Tab, Refills: 0      collagenase (SANTYL) 250 unit/gram ointment Apply  to affected area daily. [right heel]  Indications: Skin Ulcer  Qty: 15 g, Refills: 0    Associated Diagnoses: Chronic ulcer of right heel ([facility])      docusate sodium (COLACE) 100 mg capsule Take 1 Cap by mouth daily for 90 days. Qty: 30 Cap, Refills: 0      furosemide (LASIX) 20 mg tablet 1 tablet daily  Qty: 30 Tab, Refills: 0      potassium chloride (KLOR-CON) 20 mEq packet Take 1 Packet by mouth two (2) times daily (with meals). Qty: 60 Packet, Refills: 0      CEFAZOLIN SODIUM/DEXTROSE,ISO (CEFAZOLIN IN DEXTROSE, ISO-OS,) 2 gram/50 mL pgbk 50 mL by IntraVENous route every eight (8) hours. Stop 9/13/17. Qty: 50 mL, Refills: 0      amiodarone (CORDARONE) 200 mg tablet Take 1 Tab by mouth daily. Indications: VENTRICULAR RATE CONTROL IN ATRIAL FIBRILLATION  Qty: 60 Tab, Refills: 0      aspirin 81 mg chewable tablet Take 1 Tab by mouth daily (with breakfast). Qty: 90 Tab, Refills: 3      cholecalciferol (VITAMIN D3) 1,000 unit tablet Take 1 Tab by mouth daily. Qty: 90 Tab, Refills: 3      ascorbic acid, vitamin C, (VITAMIN C) 250 mg tablet Take 1 Tab by mouth daily (with breakfast). Qty: 90 Tab, Refills: 2      DULoxetine (CYMBALTA) 60 mg capsule Take 1 Cap by mouth daily.   Qty: 90 Cap, Refills: 2 zinc sulfate (ZINCATE) 220 (50) mg capsule Take 1 Cap by mouth daily. Qty: 90 Cap, Refills: 0      ferrous sulfate 325 mg (65 mg iron) tablet Take 1 Tab by mouth daily (with breakfast). Qty: 90 Tab, Refills: 3      metoprolol tartrate (LOPRESSOR) 25 mg tablet Take 0.5 Tabs by mouth every twelve (12) hours. Indications: hypertension, VENTRICULAR RATE CONTROL IN ATRIAL FIBRILLATION  Qty: 30 Tab, Refills: 6      dilTIAZem (CARDIZEM) 30 mg tablet Take 1 Tab by mouth Before breakfast, lunch, dinner and at bedtime. Indications: hypertension  Qty: 120 Tab, Refills: 6      cyanocobalamin (VITAMIN B-12) 1,000 mcg tablet Take 1 Tab by mouth daily. Qty: 90 Tab, Refills: 3      oxyCODONE-acetaminophen (PERCOCET 10)  mg per tablet Take 1-2 Tabs by mouth every four (4) hours as needed. Max Daily Amount: 12 Tabs. Qty: 80 Tab, Refills: 0      polyethylene glycol (MIRALAX) 17 gram packet Take 1 Packet by mouth daily. Qty: 30 Packet, Refills: 0      atorvastatin (LIPITOR) 40 mg tablet Take 1 Tab by mouth nightly.  Indications: DYSLIPIDEMIA  Qty: 90 Tab, Refills: 3             Current Facility-Administered Medications   Medication Dose Route Frequency    potassium chloride (K-DUR, KLOR-CON) SR tablet 20 mEq  20 mEq Oral DAILY    metoprolol tartrate (LOPRESSOR) tablet 25 mg  25 mg Oral Q12H    HYDROmorphone (PF) (DILAUDID) injection 1 mg  1 mg IntraVENous Q4H PRN    HYDROmorphone (DILAUDID) tablet 2 mg  2 mg Oral Q4H PRN    ampicillin-sulbactam (UNASYN) 3 g in 0.9% sodium chloride (MBP/ADV) 100 mL MBP  3 g IntraVENous Q6H    albuterol-ipratropium (DUO-NEB) 2.5 MG-0.5 MG/3 ML  3 mL Nebulization Q6H PRN    amiodarone (CORDARONE) tablet 200 mg  200 mg Oral DAILY    0.9% sodium chloride infusion 250 mL  250 mL IntraVENous PRN    sodium chloride (NS) flush 5-10 mL  5-10 mL IntraVENous PRN    ascorbic acid (vitamin C) (VITAMIN C) tablet 250 mg  250 mg Oral DAILY WITH BREAKFAST    aspirin chewable tablet 81 mg  81 mg Oral DAILY WITH BREAKFAST    atorvastatin (LIPITOR) tablet 40 mg  40 mg Oral QHS    cholecalciferol (VITAMIN D3) tablet 1,000 Units  1,000 Units Oral DAILY    cyanocobalamin tablet 1,000 mcg  1,000 mcg Oral DAILY    docusate sodium (COLACE) capsule 100 mg  100 mg Oral DAILY    ferrous sulfate tablet 325 mg  325 mg Oral DAILY WITH BREAKFAST    furosemide (LASIX) tablet 20 mg  20 mg Oral DAILY    gabapentin (NEURONTIN) capsule 300 mg  300 mg Oral Q8H    lactobacillus sp. 50 billion cpu (BIO-K PLUS) capsule 1 Cap  1 Cap Oral DAILY    losartan (COZAAR) tablet 50 mg  50 mg Oral DAILY    magnesium oxide (MAG-OX) tablet 400 mg  400 mg Oral DAILY    polyethylene glycol (MIRALAX) packet 17 g  17 g Oral DAILY    zinc sulfate (ZINCATE) capsule 220 mg  220 mg Oral DAILY    sodium chloride (NS) flush 5-10 mL  5-10 mL IntraVENous Q8H    sodium chloride (NS) flush 5-10 mL  5-10 mL IntraVENous PRN    acetaminophen (TYLENOL) tablet 650 mg  650 mg Oral Q4H PRN    naloxone (NARCAN) injection 0.4 mg  0.4 mg IntraVENous PRN    ondansetron (ZOFRAN) injection 4 mg  4 mg IntraVENous Q4H PRN    diphenhydrAMINE (BENADRYL) injection 12.5 mg  12.5 mg IntraVENous Q4H PRN    magnesium hydroxide (MILK OF MAGNESIA) 400 mg/5 mL oral suspension 30 mL  30 mL Oral DAILY PRN    heparin (porcine) injection 5,000 Units  5,000 Units SubCUTAneous Q8H       Allergies: Morphine    Temp (24hrs), Av.3 °F (36.8 °C), Min:97.8 °F (36.6 °C), Max:98.8 °F (37.1 °C)    Visit Vitals    /80    Pulse 66    Temp 98.5 °F (36.9 °C)    Resp 13    Wt 65.7 kg (144 lb 13.5 oz)    SpO2 93%    BMI 20.78 kg/m2       ROS: 12 point ROS obtained in details.  Pertinent positives as mentioned in HPI,   otherwise negative    Physical Exam:    General: Well developed, well nourished male laying on the bed AAOx3 in no acute distress.     General:   awake alert and oriented   HEENT:  Normocephalic, atraumatic, PERRL, EOMI, no scleral icterus or pallor; no conjunctival hemmohage;  nasal and oral mucous are moist and without evidence of lesions. No thrush. Neck supple, no bruits. Lymph Nodes:   no cervical, axillary or inguinal adenopathy   Lungs:   non-labored, bilaterally clear to auscultation- no crackles wheezes rales or rhonchi   Heart:  RRR, s1 and s2; no  rubs or gallops, no edema, + pedal pulses   Abdomen:  soft, non-distended, active bowel sounds, no hepatomegaly, no splenomegaly. dressing right lower abdomen, no erythema   Genitourinary:  deferred   Extremities:   no clubbing, cyanosis; no joint effusions or swelling; drain left groin; muscle mass appropriate for age, left aka stump covered with dressing   Neurologic:  No gross focal sensory abnormalities; 5/5 muscle strength to upper and lower extremities. Speech appropriate. Cranial nerves intact                        Skin:  Surgical changes LLE, abdominal wall   Back:  no spinal or paraspinal muscle tenderness or rigidity, no CVA tenderness   Psychiatric:  No suicidal or homicidal ideations, appropriate mood and affect          Labs: Results:   Chemistry Recent Labs      08/23/17   0550  08/22/17   0550  08/21/17   0549   GLU  93  103*  112*   NA  135*  134*  138   K  3.5  4.1  3.4*   CL  101  101  104   CO2  28  25  25   BUN  6*  7  8   CREA  0.50*  0.55*  0.56*   CA  8.7  8.1*  8.5   AGAP  6  8  9   BUCR  12  13  14   AP  718*  734*  387*   TP  6.9  6.7  7.4   ALB  2.1*  2.3*  2.3*   GLOB  4.8*  4.4*  5.1*   AGRAT  0.4*  0.5*  0.5*      CBC w/Diff Recent Labs      08/23/17   0550  08/22/17   0550  08/21/17   0549   WBC  11.6  14.5*  14.8*   RBC  2.84*  2.65*  2.64*   HGB  8.1*  7.6*  7.5*   HCT  24.6*  23.1*  22.9*   PLT  567*  513*  491*   GRANS  74*  78*  73   LYMPH  14*  12*  11*   EOS  1  1  2      Microbiology No results for input(s): CULT in the last 72 hours.        RADIOLOGY:    All available imaging studies/reports in [facility] for this admission were reviewed    Dr. Camron Chong, Infectious Disease Specialist  [phone]  August 23, 2017  12:28 PM

## 2017-08-23 NOTE — WOUND CARE
Physical Exam   Room 2353: wound re-assessment  Pt was seen by wound care this am.  Will continue dressing changes as ordered. Wound vac will continue for left thigh & discontinue on left groin. Will turn over care to nursing staff at this time.   Brina ROWANN, RN, [facility] [place], [address]

## 2017-08-23 NOTE — PROGRESS NOTES
Problem: Mobility Impaired (Adult and Pediatric)  Goal: *Acute Goals and Plan of Care (Insert Text)  Physical Therapy Goals  Initiated 8/22/2017 and to be accomplished within 7 day(s)  1. Patient will move from supine to sit and sit to supine and scoot up and down in bed with supervision/set-up. 2. Patient will transfer from bed to chair and chair to bed with minimal assistance/contact guard assist using the least restrictive device. 3. Patient will perform sit to stand with minimal assistance/contact guard assist.  4. Patient will ambulate with minimal assistance/contact guard assist for 50 feet with the least restrictive device. PHYSICAL THERAPY TREATMENT     Patient: Francy Fontaine (75 y.o. male)  Date: 8/23/2017  Diagnosis: Ischemic Rest Pain of Left Lower Extremity  dx  Atherosclerosis of native artery of both lower extremities with intermittent claudication (HCC)  PAD (peripheral artery disease) (HCC)  Infected prosthetic vascular graft, sequela Ischemic rest pain of lower extremity (HCC)  Procedure(s) (LRB):  removal of right infected graft with jump bypass (Right)  AMPUTATION KNEE(AKA)-left leg (Left) 5 Days Post-Op  Precautions: Fall, Skin (new L AKA)  Chart, physical therapy assessment, plan of care and goals were reviewed. ASSESSMENT:  Pt demo's fantastic motivation to participate with PT intervention today and marvelous progress toward goals as indicated by decreased assist req'd throughout. Pt is able to initiate gait training/oob transfer training utilizing RW for support. Pt is receptive to all cues and recommendations. Decreased anxiety noted during sit<>stand with use of UE specific reaching during ascent/descent (R up/L down) in order to maintain 2 points of contact on supportive surface during transition.   Pt is superbly appropriate for transfer to ARU setting to promote maximal gains in strength,activity tolerance, and functional mobility proficiency during this time of transition. Progression toward goals:  [ ]      Improving appropriately and progressing toward goals  [X]      Improving slowly and progressing toward goals  [ ]      Not making progress toward goals and plan of care will be adjusted       PLAN:   Patient continues to benefit from skilled intervention to address the above impairments. Continue treatment per established plan of care. Discharge Recommendations:  Rehab  Further Equipment Recommendations for Discharge:  N/A       SUBJECTIVE:   Patient stated Oh we are huh?   Pt states,jovially, in response to statement \"we are going to make it to the chair today. \"      OBJECTIVE DATA SUMMARY:   Critical Behavior:  Neurologic State: Alert  Orientation Level: Oriented X4  Cognition: Follows commands, Appropriate decision making, Appropriate for age attention/concentration, Appropriate safety awareness  Safety/Judgement: Awareness of environment, Fall prevention, Insight into deficits  Functional Mobility Training:  Bed Mobility:  Rolling: Minimum assistance (verbal cues for log roll to maximize efficiency)  Supine to Sit: Minimum assistance  Transfers:  Sit to Stand: Moderate assistance;Minimum assistance (Ascend L hand first/descend with R hand first)  Stand to Sit: Minimum assistance  Bed to Chair: Minimum assistance; Moderate assistance  Other: stand step with RW  Balance:  Sitting: Impaired; Without support  Sitting - Static: Good (unsupported)  Sitting - Dynamic: Fair (occasional)  Standing: Impaired; With support  Standing - Static: Fair  Standing - Dynamic : Fair  Ambulation/Gait Training:  Distance (ft): 6 Feet (ft)  Assistive Device: Walker, rolling  Ambulation - Level of Assistance: Minimal assistance; Moderate assistance  Gait Abnormalities: Decreased step clearance (swing to method with minimal verbal cues throughout)  Left Side Weight Bearing: Non-weight bearing  Stance: Right increased  Speed/Suzanna: Slow  Step Length: Right shortened  Interventions: Verbal cues;Visual/Demos  Therapeutic Exercises:   Supine isometrics: hip abd, hip add,hip ext (10\" holds)  Sets: 1  Reps: 10  Assist level: setup  Pain:  Pt reports 4/10 pain or discomfort prior to treatment. Pt reports 5/10 pain or discomfort post treatment. Activity Tolerance:   Fair/fair+  Please refer to the flowsheet for vital signs taken during this treatment.   After treatment:   [X] Patient left in no apparent distress sitting up in chair  [ ] Patient left in no apparent distress in bed  [X] Call bell left within reach  [X] Nursing notified  [ ] Caregiver present  [ ] Bed alarm activated      Eloise Zhou PTA   Time Calculation: 33 mins

## 2017-08-24 ENCOUNTER — APPOINTMENT (OUTPATIENT)
Dept: INTERVENTIONAL RADIOLOGY/VASCULAR | Age: 57
DRG: 239 | End: 2017-08-24
Attending: INTERNAL MEDICINE
Payer: COMMERCIAL

## 2017-08-24 LAB
ANION GAP SERPL CALC-SCNC: 6 MMOL/L (ref 3–18)
BASOPHILS # BLD: 0.1 K/UL (ref 0–0.1)
BASOPHILS NFR BLD: 0 % (ref 0–2)
BUN SERPL-MCNC: 7 MG/DL (ref 7–18)
BUN/CREAT SERPL: 14 (ref 12–20)
CALCIUM SERPL-MCNC: 8.7 MG/DL (ref 8.5–10.1)
CHLORIDE SERPL-SCNC: 98 MMOL/L (ref 100–108)
CO2 SERPL-SCNC: 29 MMOL/L (ref 21–32)
CREAT SERPL-MCNC: 0.5 MG/DL (ref 0.6–1.3)
DIFFERENTIAL METHOD BLD: ABNORMAL
EOSINOPHIL # BLD: 0.1 K/UL (ref 0–0.4)
EOSINOPHIL NFR BLD: 1 % (ref 0–5)
ERYTHROCYTE [DISTWIDTH] IN BLOOD BY AUTOMATED COUNT: 17.9 % (ref 11.6–14.5)
GLUCOSE SERPL-MCNC: 92 MG/DL (ref 74–99)
HCT VFR BLD AUTO: 25.6 % (ref 36–48)
HGB BLD-MCNC: 8.4 G/DL (ref 13–16)
LYMPHOCYTES # BLD: 2.2 K/UL (ref 0.9–3.6)
LYMPHOCYTES NFR BLD: 19 % (ref 21–52)
MAGNESIUM SERPL-MCNC: 1.8 MG/DL (ref 1.6–2.6)
MCH RBC QN AUTO: 28.4 PG (ref 24–34)
MCHC RBC AUTO-ENTMCNC: 32.8 G/DL (ref 31–37)
MCV RBC AUTO: 86.5 FL (ref 74–97)
MONOCYTES # BLD: 1.2 K/UL (ref 0.05–1.2)
MONOCYTES NFR BLD: 10 % (ref 3–10)
NEUTS SEG # BLD: 8.1 K/UL (ref 1.8–8)
NEUTS SEG NFR BLD: 70 % (ref 40–73)
PLATELET # BLD AUTO: 660 K/UL (ref 135–420)
PMV BLD AUTO: 9.4 FL (ref 9.2–11.8)
POTASSIUM SERPL-SCNC: 4.3 MMOL/L (ref 3.5–5.5)
RBC # BLD AUTO: 2.96 M/UL (ref 4.7–5.5)
SODIUM SERPL-SCNC: 133 MMOL/L (ref 136–145)
WBC # BLD AUTO: 11.6 K/UL (ref 4.6–13.2)

## 2017-08-24 PROCEDURE — 74011000258 HC RX REV CODE- 258: Performed by: INTERNAL MEDICINE

## 2017-08-24 PROCEDURE — 97530 THERAPEUTIC ACTIVITIES: CPT

## 2017-08-24 PROCEDURE — 97116 GAIT TRAINING THERAPY: CPT

## 2017-08-24 PROCEDURE — 36415 COLL VENOUS BLD VENIPUNCTURE: CPT | Performed by: INTERNAL MEDICINE

## 2017-08-24 PROCEDURE — 97535 SELF CARE MNGMENT TRAINING: CPT

## 2017-08-24 PROCEDURE — 80048 BASIC METABOLIC PNL TOTAL CA: CPT | Performed by: INTERNAL MEDICINE

## 2017-08-24 PROCEDURE — 74011250637 HC RX REV CODE- 250/637: Performed by: SURGERY

## 2017-08-24 PROCEDURE — 74011250636 HC RX REV CODE- 250/636: Performed by: INTERNAL MEDICINE

## 2017-08-24 PROCEDURE — 74011250636 HC RX REV CODE- 250/636: Performed by: SURGERY

## 2017-08-24 PROCEDURE — 85025 COMPLETE CBC W/AUTO DIFF WBC: CPT | Performed by: INTERNAL MEDICINE

## 2017-08-24 PROCEDURE — 65270000029 HC RM PRIVATE

## 2017-08-24 PROCEDURE — 74011250637 HC RX REV CODE- 250/637: Performed by: INTERNAL MEDICINE

## 2017-08-24 PROCEDURE — 77001 FLUOROGUIDE FOR VEIN DEVICE: CPT

## 2017-08-24 PROCEDURE — 83735 ASSAY OF MAGNESIUM: CPT | Performed by: INTERNAL MEDICINE

## 2017-08-24 RX ORDER — POTASSIUM CHLORIDE 1.5 G/1.77G
20 POWDER, FOR SOLUTION ORAL DAILY
Qty: 60 PACKET | Refills: 0 | Status: ON HOLD
Start: 2017-08-24 | End: 2017-09-07 | Stop reason: CLARIF

## 2017-08-24 RX ORDER — METOPROLOL TARTRATE 25 MG/1
25 TABLET, FILM COATED ORAL EVERY 12 HOURS
Qty: 30 TAB | Refills: 6 | Status: SHIPPED
Start: 2017-08-24 | End: 2017-10-18 | Stop reason: SDUPTHER

## 2017-08-24 RX ADMIN — HEPARIN SODIUM 5000 UNITS: 5000 INJECTION, SOLUTION INTRAVENOUS; SUBCUTANEOUS at 04:07

## 2017-08-24 RX ADMIN — GABAPENTIN 300 MG: 300 CAPSULE ORAL at 21:23

## 2017-08-24 RX ADMIN — GABAPENTIN 300 MG: 300 CAPSULE ORAL at 06:35

## 2017-08-24 RX ADMIN — FUROSEMIDE 20 MG: 20 TABLET ORAL at 09:13

## 2017-08-24 RX ADMIN — HYDROMORPHONE HYDROCHLORIDE 4 MG: 2 TABLET ORAL at 17:22

## 2017-08-24 RX ADMIN — Medication 325 MG: at 09:14

## 2017-08-24 RX ADMIN — Medication 250 MG: at 09:14

## 2017-08-24 RX ADMIN — Medication 10 ML: at 21:24

## 2017-08-24 RX ADMIN — HYDROMORPHONE HYDROCHLORIDE 4 MG: 2 TABLET ORAL at 04:06

## 2017-08-24 RX ADMIN — HEPARIN SODIUM 5000 UNITS: 5000 INJECTION, SOLUTION INTRAVENOUS; SUBCUTANEOUS at 21:23

## 2017-08-24 RX ADMIN — METOPROLOL TARTRATE 25 MG: 25 TABLET ORAL at 21:00

## 2017-08-24 RX ADMIN — LOSARTAN POTASSIUM 50 MG: 50 TABLET, FILM COATED ORAL at 09:14

## 2017-08-24 RX ADMIN — CYANOCOBALAMIN TAB 1000 MCG 1000 MCG: 1000 TAB at 09:14

## 2017-08-24 RX ADMIN — POTASSIUM CHLORIDE 20 MEQ: 20 TABLET, EXTENDED RELEASE ORAL at 09:14

## 2017-08-24 RX ADMIN — HYDROMORPHONE HYDROCHLORIDE 4 MG: 2 TABLET ORAL at 21:23

## 2017-08-24 RX ADMIN — ZINC SULFATE 220 MG (50 MG) CAPSULE 220 MG: CAPSULE at 09:14

## 2017-08-24 RX ADMIN — AMPICILLIN AND SULBACTAM 3 G: 1; 2 INJECTION, POWDER, FOR SOLUTION INTRAMUSCULAR; INTRAVENOUS at 09:13

## 2017-08-24 RX ADMIN — DOCUSATE SODIUM 100 MG: 100 CAPSULE, LIQUID FILLED ORAL at 09:14

## 2017-08-24 RX ADMIN — HYDROMORPHONE HYDROCHLORIDE 4 MG: 2 TABLET ORAL at 13:22

## 2017-08-24 RX ADMIN — ATORVASTATIN CALCIUM 40 MG: 40 TABLET, FILM COATED ORAL at 21:23

## 2017-08-24 RX ADMIN — GABAPENTIN 300 MG: 300 CAPSULE ORAL at 13:22

## 2017-08-24 RX ADMIN — METOPROLOL TARTRATE 25 MG: 25 TABLET ORAL at 09:13

## 2017-08-24 RX ADMIN — POLYETHYLENE GLYCOL 3350 17 G: 17 POWDER, FOR SOLUTION ORAL at 09:13

## 2017-08-24 RX ADMIN — Medication 400 MG: at 09:13

## 2017-08-24 RX ADMIN — HYDROMORPHONE HYDROCHLORIDE 4 MG: 2 TABLET ORAL at 09:14

## 2017-08-24 RX ADMIN — Medication 10 ML: at 07:08

## 2017-08-24 RX ADMIN — HEPARIN SODIUM 5000 UNITS: 5000 INJECTION, SOLUTION INTRAVENOUS; SUBCUTANEOUS at 13:22

## 2017-08-24 RX ADMIN — HYDROMORPHONE HYDROCHLORIDE 4 MG: 2 TABLET ORAL at 00:17

## 2017-08-24 RX ADMIN — Medication 1 CAPSULE: at 09:14

## 2017-08-24 RX ADMIN — AMPICILLIN AND SULBACTAM 3 G: 1; 2 INJECTION, POWDER, FOR SOLUTION INTRAMUSCULAR; INTRAVENOUS at 22:32

## 2017-08-24 RX ADMIN — ASPIRIN 81 MG 81 MG: 81 TABLET ORAL at 09:14

## 2017-08-24 RX ADMIN — AMIODARONE HYDROCHLORIDE 200 MG: 200 TABLET ORAL at 09:14

## 2017-08-24 RX ADMIN — VITAMIN D, TAB 1000IU (100/BT) 1000 UNITS: 25 TAB at 09:14

## 2017-08-24 RX ADMIN — AMPICILLIN AND SULBACTAM 3 G: 1; 2 INJECTION, POWDER, FOR SOLUTION INTRAMUSCULAR; INTRAVENOUS at 04:07

## 2017-08-24 RX ADMIN — IRON SUCROSE 200 MG: 20 INJECTION, SOLUTION INTRAVENOUS at 16:21

## 2017-08-24 NOTE — INTERDISCIPLINARY ROUNDS
Interdisciplinary Round Note   Patient Information: Patient Information: Chastity Course                                      [birth year]   Reason for Admission: Ischemic Rest Pain of Left Lower Extremity  dx  Atherosclerosis of native artery of both lower extremities with intermittent claudication (HCC)  PAD (peripheral artery disease) (HCC)  Infected prosthetic vascular graft, sequela   Attending Provider:   Jennifer Brady MD  Primary Care Physician:       Delisa Flores MD       [phone]   Past Medical History:   Past Medical History:   Diagnosis Date    Acute blood loss as cause of postoperative anemia 4/4/2017    Anticoagulated on Coumadin     Aortoiliac occlusive disease ([address] 1/25/2017    Atherosclerosis of native artery of both lower extremities with intermittent claudication ([address] 1/25/2017    Benign hypertensive heart disease with systolic congestive heart failure, NYHA class 2 ([address] 1/26/2015    Carotid artery disease (HCC)     Chronic anemia     Chronic systolic heart failure (HCC)     Chronic ulcer of right foot ([address] 4/4/2017    Chronic ulcer of right heel ([address] 8/8/2017    Coronary artery disease involving native coronary artery of native heart 3/15/2017    Successful stenting of Cx (Xience LESLEY) and RCA (Xience LESLEY) to 0% by Dr. Rashid Calderon on 3/15/17.     DDD (degenerative disc disease), lumbar 1/26/2015    Dyslipidemia     Erectile dysfunction 7/5/2016    Euthyroid sick syndrome 4/6/2017    Hereditary peripheral neuropathy 11/15/2016    History of cardioversion 4/18/2017    S/P Synchronized external cardioversion (4/18/2017 - Dr. Coy Ramirez)    History of vitamin D deficiency 4/22/2017    Vitamin D 25-Hydroxy (4/22/2017) = 12.0; Vitamin D 25-Hydroxy (5/26/2017) = 38.7    Infection of vascular bypass graft ([address] 7/24/2017    Left lower extremity    Ischemic cardiomyopathy     Moderate to severe pulmonary hypertension ([address] 7/23/2017    Paroxysmal atrial fibrillation ([address]     Peripheral artery disease ([facility] 1/25/2017    Spinal stenosis of lumbar region with radiculopathy 5/4/2015    Dr. Javon Adrian day: 7  Estimated discharge date: 8/25  RRAT Score: Medium Risk            19       Total Score        3 Has Seen PCP in Last 6 Months (Yes=3, No=0)    3 Patient Length of Stay (>5 days = 3)    11 IP Visits Last 12 Months (1-3=4, 4=9, >4=11)    2 Charlson Comorbidity Score (Age + Comorbid Conditions)        Criteria that do not apply:    . Living with Significant Other. Assisted Living. LTAC. SNF. or   Rehab    Pt.  Coverage (Medicare=5 , Medicaid, or Self-Pay=4)       Goals for Today: pain control      Overnight Events:safety     VITAL SIGNS  Vitals:    08/23/17 1957 08/24/17 0453 08/24/17 0807 08/24/17 1142   BP: 137/63 157/78 136/67 138/67   Pulse: 72 65 73 68   Resp: 18 18 16 18   Temp: 99.1 °F (37.3 °C) 98.2 °F (36.8 °C) 98.5 °F (36.9 °C) 98.2 °F (36.8 °C)   SpO2: 97% 100% 96% 96%   Weight:              Lines, Drains, & Airways    PICC Double Lumen 35/45/80 Right;Basilic-Site Assessment: Clean, dry, & intact  Vacuum Assisted Closure Left Groin-Site Assessment: Clean, dry, & intact  [REMOVED] Peripheral IV Left Wrist-Site Assessment: Clean, dry, & intact  [REMOVED] Peripheral IV 08/18/17 Left Arm-Site Assessment: Intact, Drainage (comment)  [REMOVED] Peripheral IV 08/18/17 Right Arm-Site Assessment: Clean, dry, & intact  [REMOVED] Arterial Line 08/18/17 Left Radial artery-Site Assessment: Clean, dry, & intact  [REMOVED] Peripheral IV 08/20/17 Right Hand-Site Assessment: Clean, dry, & intact       VTE Prophylaxis                                   Intake and Output:   08/22 1901 - 08/24 0700  In: 1860 [P.O.:1360; I.V.:500]  Out: 3895 [Urine:3895]  08/24 0701 - 08/24 1900  In: -   Out: 400 [Urine:400] Current Diet: DIET CARDIAC Regular  DIET NUTRITIONAL SUPPLEMENTS Breakfast, Lunch; ENSURE ENLIVE       Abdominal   Last Bowel Movement Date: 08/23/17  Stool Appearance: Formed  Abdominal Assessment: Intact  Appetite: Good  Bowel Sounds: Active   GI Prophylaxis: yes        Type: zofran        Recent Glucose Results:   Lab Results   Component Value Date/Time    GLU 92 08/24/2017 03:00 AM          IV Antibiotics? n/a       When started: n/a   Activity Level:   Activity Level: Bed Rest  Needs assistance with ADLs: yes  PT Consult Status: pt/ot Current Immunizations:  Immunization History   Administered Date(s) Administered    Tdap 07/05/2016          Recommendations:   Discharge Disposition: TBD, pending progress    Needs for Discharge: n/a Recommendations from IDR team: n/a    Other Notes: n/a

## 2017-08-24 NOTE — PROGRESS NOTES
SUBJECTIVE:    Feeling better. No chest or abdominal pain. No SOB or cough. Leg pain is better. No N/V/D. OBJECTIVE:    Visit Vitals    /67 (BP 1 Location: Left arm, BP Patient Position: At rest)    Pulse 68    Temp 98.2 °F (36.8 °C)    Resp 18    Wt 65.7 kg (144 lb 13.5 oz)    SpO2 96%    BMI 20.78 kg/m2     RS: CTA bilaterally, no wheezes  CVS: RRR  GI: NT, ND, Soft  Extremities:  L AKA and dressing in situ WITH WOUND VAC. No pedal edema in RLE  General: NAD, follows commands     ASSESSMENT:    1. Acute metabolic encephalopathy due to pain, clinically improved   2. 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 Leukocytosis and LG fever, better  4. Hyponatremia, stable  5. Anemia including iron deficiency anemia, stable  6. Prolonged QTc initially, improved   7. 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 Compensated. 8. Afib. Rate controlled.     PLAN:    Continue current management  Venofer x 1  cont iv ampicillin/sulbactam till 9/19/17 per ID through PICC  Can be discharged to ARU once he gets approval  Talked to [id number] [facility] [[place]] - she will take care of discharge paperwork and will follow him in ARU  Will sign off tomorrow      CMP:   Lab Results   Component Value Date/Time     (L) 08/24/2017 03:00 AM    K 4.3 08/24/2017 03:00 AM    CL 98 (L) 08/24/2017 03:00 AM    CO2 29 08/24/2017 03:00 AM    AGAP 6 08/24/2017 03:00 AM    GLU 92 08/24/2017 03:00 AM    BUN 7 08/24/2017 03:00 AM    CREA 0.50 (L) 08/24/2017 03:00 AM    GFRAA >60 08/24/2017 03:00 AM    GFRNA >60 08/24/2017 03:00 AM    CA 8.7 08/24/2017 03:00 AM    MG 1.8 08/24/2017 03:00 AM     CBC:   Lab Results   Component Value Date/Time    WBC 11.6 08/24/2017 03:00 AM    HGB 8.4 (L) 08/24/2017 03:00 AM    HCT 25.6 (L) 08/24/2017 03:00 AM     (H) 08/24/2017 03:00 AM

## 2017-08-24 NOTE — PROGRESS NOTES
Problem: Mobility Impaired (Adult and Pediatric)  Goal: *Acute Goals and Plan of Care (Insert Text)  Physical Therapy Goals  Initiated 8/22/2017 and to be accomplished within 7 day(s)  1. Patient will move from supine to sit and sit to supine and scoot up and down in bed with supervision/set-up. 2. Patient will transfer from bed to chair and chair to bed with minimal assistance/contact guard assist using the least restrictive device. 3. Patient will perform sit to stand with minimal assistance/contact guard assist.  4. Patient will ambulate with minimal assistance/contact guard assist for 50 feet with the least restrictive device. Outcome: Progressing Towards Goal  PHYSICAL THERAPY TREATMENT     Patient: Marcella Palmer (46 y.o. male)  Date: 8/24/2017  Diagnosis: Ischemic Rest Pain of Left Lower Extremity  dx  Atherosclerosis of native artery of both lower extremities with intermittent claudication (HCC)  PAD (peripheral artery disease) (HCC)  Infected prosthetic vascular graft, sequela Ischemic rest pain of lower extremity (HCC)  Procedure(s) (LRB):  removal of right infected graft with jump bypass (Right)  AMPUTATION KNEE(AKA)-left leg (Left) 6 Days Post-Op  Precautions: Fall, Skin (new L AKA)   Chart, physical therapy assessment, plan of care and goals were reviewed. ASSESSMENT:  Patient presents sitting up in bed with daughter in room, agreeable to PT treatment. Patient scooted to EOB with CGA, provided with visual demonstration of bed to chair transfer using RW. Patient performed sit to stand using RW and Waylon, requires CGA to maintain standing balance prior to ambulation. He perform bed to chair transfer with CGA/Waylon using RW, lowered to sitting with Waylon. After seated rest break, patient trained in safe technique of sit to stand transfer for correct hand placement, encouraged to use L hand on RW and R on supporting surface during transfers, patient demonstrated improved technique following training. Patient left seated in recliner with LE elevated, wound vac in place, and patient demonstrating correct technique of hip ABD/ADD/ext exercises using towel roll. Patient continues to demonstrate excellent rehab potential, would recommend ARU at discharge from hospital.   Progression toward goals:  [X]      Improving appropriately and progressing toward goals  [ ]      Improving slowly and progressing toward goals  [ ]      Not making progress toward goals and plan of care will be adjusted       PLAN:  Patient continues to benefit from skilled intervention to address the above impairments. Continue treatment per established plan of care. Discharge Recommendations:  Inpatient Rehab  Further Equipment Recommendations for Discharge:  N/A       SUBJECTIVE:   Patient stated I need you to show me what we're going to do.       OBJECTIVE DATA SUMMARY:   Critical Behavior:  Neurologic State: Alert  Orientation Level: Oriented X4  Cognition: Follows commands, Appropriate safety awareness, Appropriate for age attention/concentration, Appropriate decision making  Safety/Judgement: Awareness of environment, Fall prevention, Insight into deficits  Functional Mobility Training:  Bed Mobility:  Rolling: Minimum assistance;Contact guard assistance  Supine to Sit: Minimum assistance;Contact guard assistance  Sit to Supine: Minimum assistance;Contact guard assistance  Scooting: Contact guard assistance  Transfers:  Sit to Stand: Minimum assistance  Stand to Sit: Contact guard assistance;Minimum assistance  Bed to Chair: Minimum assistance  Balance:  Sitting: Impaired; With support  Sitting - Static: Good (unsupported)  Sitting - Dynamic: Fair (occasional)  Standing: Impaired; With support (RW)  Standing - Static: Fair  Standing - Dynamic : Fair  Ambulation/Gait Training:  Distance (ft): 6 Feet (ft)  Assistive Device: Walker, rolling;Gait belt  Ambulation - Level of Assistance: Minimal assistance  Gait Abnormalities: Decreased step clearance  Left Side Weight Bearing: Non-weight bearing  Stance: Right increased  Speed/Suzanna: Slow  Step Length: Right shortened  Interventions: Verbal cues; Visual/Demos; Safety awareness training  Therapeutic Exercises:   Patient demonstrated correct technique of hip ABD/ADD/extension exercises using towel roll   Pain:  Pain Scale 1: Numeric (0 - 10)  Pain Intensity 1: 7  Pain Location 1: Leg  Pain Orientation 1: Left  Pain Intervention(s) 1: Medication (see MAR)  Activity Tolerance:   Good  Please refer to the flowsheet for vital signs taken during this treatment.   After treatment:   [X] Patient left in no apparent distress sitting up in chair  [ ] Patient left in no apparent distress in bed  [X] Call bell left within reach  [X] Nursing notified Berl Daughters)  [ ] Caregiver present  [ ] Bed alarm activated      [facility]   Time Calculation: 24 mins

## 2017-08-24 NOTE — PROGRESS NOTES
Bedside shift change report given to Kun Jimenez RN (oncoming nurse) by Cassie Izaguirre RN (offgoing nurse). Report included the following information SBAR, Kardex, Intake/Output, MAR, Recent Results and Med Rec Status.

## 2017-08-24 NOTE — PROGRESS NOTES
Infectious Disease progress Note    Requested by: dr. Juliet Rodriguez    Reason: infected right axillary femoral bypass graft    Current abx Prior abx   Ampicillin/sulbactam since 8/22 Pip/tazo, vancomycin since 8/17/17-8/22/17     Lines:     PICC RUE 8/24/17    Assessment :    Nick willoughby 59 y. o. male with CAD, severe peripheral arterial insufficiency admitted to [facility] on 8/17/17.      Recent hospitalization in 7/2017 for sepsis due to MSSA bloodstream infection (positive blood cultures 7/22, 7/24; negative blood cultures 7/28). Most likely source of MSSA bacteremia is infected left groin vascular graft. Patient appropriately underwent removal of infected graft, Repair of superficial femoral artery, left side; Repair of common femoral artery on 7/25/17. Intra op cx: MSSA. Clinical presentation consistent with acute LLE ischemia, infected right axillary femoral bypass graft. Patient has been appropriately managed with left AKA and excision of the infected right axillary femoral bypass graft on 8/18/17. Intra op cultures negative likely due to prior cefazolin.      Clinically better     Recommendations:     1. cont iv ampicillin/sulbactam till 9/19/17  2. F/u vascular surgery recommendations  3. Will need PICC line for outpt iv abx  4. Weekly cbc, renal function while on antibiotics    Advance Care planning: full code: discussed  with patient/surrogate decision maker: Jacob Wise: [phone]       Above plan was discussed in details with patient,  and . Please call me if any further questions or concerns. Will continue to participate in the care of this patient. subjective:    Patient complains of some pain at the recent surgical site left groin. Patient denies headaches, visual disturbances, sore throat, runny nose, earaches, cp, sob, chills, cough, abdominal pain, diarrhea, burning micturition,  or weakness in extremities.  He denies back pain/flank pain.             home Medication List Details   gabapentin (NEURONTIN) 300 mg capsule Take 1 Cap by mouth every eight (8) hours. Indications: NEUROPATHIC PAIN  Qty: 45 Cap, Refills: 0    Associated Diagnoses: Hereditary peripheral neuropathy      lactobacillus sp. 50 billion cpu (BIO-K PLUS) 50 billion cell -375 mg cap capsule Take 1 Cap by mouth daily. Qty: 15 Cap, Refills: 0    Associated Diagnoses: Infection of vascular bypass graft, subsequent encounter      losartan (COZAAR) 50 mg tablet Take 1 Tab by mouth daily. Indications: Chronic Heart Failure, hypertension  Qty: 15 Tab, Refills: 0    Associated Diagnoses: Benign hypertensive heart disease with systolic congestive heart failure, NYHA class 2 (HCC)      magnesium oxide (MAG-OX) 400 mg tablet Take 1 Tab by mouth daily. Qty: 15 Tab, Refills: 0      collagenase (SANTYL) 250 unit/gram ointment Apply  to affected area daily. [right heel]  Indications: Skin Ulcer  Qty: 15 g, Refills: 0    Associated Diagnoses: Chronic ulcer of right heel ([facility])      docusate sodium (COLACE) 100 mg capsule Take 1 Cap by mouth daily for 90 days. Qty: 30 Cap, Refills: 0      furosemide (LASIX) 20 mg tablet 1 tablet daily  Qty: 30 Tab, Refills: 0      potassium chloride (KLOR-CON) 20 mEq packet Take 1 Packet by mouth two (2) times daily (with meals). Qty: 60 Packet, Refills: 0      CEFAZOLIN SODIUM/DEXTROSE,ISO (CEFAZOLIN IN DEXTROSE, ISO-OS,) 2 gram/50 mL pgbk 50 mL by IntraVENous route every eight (8) hours. Stop 9/13/17. Qty: 50 mL, Refills: 0      amiodarone (CORDARONE) 200 mg tablet Take 1 Tab by mouth daily. Indications: VENTRICULAR RATE CONTROL IN ATRIAL FIBRILLATION  Qty: 60 Tab, Refills: 0      aspirin 81 mg chewable tablet Take 1 Tab by mouth daily (with breakfast). Qty: 90 Tab, Refills: 3      cholecalciferol (VITAMIN D3) 1,000 unit tablet Take 1 Tab by mouth daily. Qty: 90 Tab, Refills: 3      ascorbic acid, vitamin C, (VITAMIN C) 250 mg tablet Take 1 Tab by mouth daily (with breakfast).   Qty: 90 Tab, Refills: 2      DULoxetine (CYMBALTA) 60 mg capsule Take 1 Cap by mouth daily. Qty: 90 Cap, Refills: 2      zinc sulfate (ZINCATE) 220 (50) mg capsule Take 1 Cap by mouth daily. Qty: 90 Cap, Refills: 0      ferrous sulfate 325 mg (65 mg iron) tablet Take 1 Tab by mouth daily (with breakfast). Qty: 90 Tab, Refills: 3      metoprolol tartrate (LOPRESSOR) 25 mg tablet Take 0.5 Tabs by mouth every twelve (12) hours. Indications: hypertension, VENTRICULAR RATE CONTROL IN ATRIAL FIBRILLATION  Qty: 30 Tab, Refills: 6      dilTIAZem (CARDIZEM) 30 mg tablet Take 1 Tab by mouth Before breakfast, lunch, dinner and at bedtime. Indications: hypertension  Qty: 120 Tab, Refills: 6      cyanocobalamin (VITAMIN B-12) 1,000 mcg tablet Take 1 Tab by mouth daily. Qty: 90 Tab, Refills: 3      oxyCODONE-acetaminophen (PERCOCET 10)  mg per tablet Take 1-2 Tabs by mouth every four (4) hours as needed. Max Daily Amount: 12 Tabs. Qty: 80 Tab, Refills: 0      polyethylene glycol (MIRALAX) 17 gram packet Take 1 Packet by mouth daily. Qty: 30 Packet, Refills: 0      atorvastatin (LIPITOR) 40 mg tablet Take 1 Tab by mouth nightly.  Indications: DYSLIPIDEMIA  Qty: 90 Tab, Refills: 3             Current Facility-Administered Medications   Medication Dose Route Frequency    potassium chloride (K-DUR, KLOR-CON) SR tablet 20 mEq  20 mEq Oral DAILY    HYDROmorphone (DILAUDID) tablet 2-4 mg  2-4 mg Oral Q4H PRN    metoprolol tartrate (LOPRESSOR) tablet 25 mg  25 mg Oral Q12H    ampicillin-sulbactam (UNASYN) 3 g in 0.9% sodium chloride (MBP/ADV) 100 mL MBP  3 g IntraVENous Q6H    albuterol-ipratropium (DUO-NEB) 2.5 MG-0.5 MG/3 ML  3 mL Nebulization Q6H PRN    amiodarone (CORDARONE) tablet 200 mg  200 mg Oral DAILY    0.9% sodium chloride infusion 250 mL  250 mL IntraVENous PRN    sodium chloride (NS) flush 5-10 mL  5-10 mL IntraVENous PRN    ascorbic acid (vitamin C) (VITAMIN C) tablet 250 mg  250 mg Oral DAILY WITH BREAKFAST    aspirin chewable tablet 81 mg  81 mg Oral DAILY WITH BREAKFAST    atorvastatin (LIPITOR) tablet 40 mg  40 mg Oral QHS    cholecalciferol (VITAMIN D3) tablet 1,000 Units  1,000 Units Oral DAILY    cyanocobalamin tablet 1,000 mcg  1,000 mcg Oral DAILY    docusate sodium (COLACE) capsule 100 mg  100 mg Oral DAILY    ferrous sulfate tablet 325 mg  325 mg Oral DAILY WITH BREAKFAST    furosemide (LASIX) tablet 20 mg  20 mg Oral DAILY    gabapentin (NEURONTIN) capsule 300 mg  300 mg Oral Q8H    lactobacillus sp. 50 billion cpu (BIO-K PLUS) capsule 1 Cap  1 Cap Oral DAILY    losartan (COZAAR) tablet 50 mg  50 mg Oral DAILY    magnesium oxide (MAG-OX) tablet 400 mg  400 mg Oral DAILY    polyethylene glycol (MIRALAX) packet 17 g  17 g Oral DAILY    zinc sulfate (ZINCATE) capsule 220 mg  220 mg Oral DAILY    sodium chloride (NS) flush 5-10 mL  5-10 mL IntraVENous Q8H    sodium chloride (NS) flush 5-10 mL  5-10 mL IntraVENous PRN    acetaminophen (TYLENOL) tablet 650 mg  650 mg Oral Q4H PRN    naloxone (NARCAN) injection 0.4 mg  0.4 mg IntraVENous PRN    ondansetron (ZOFRAN) injection 4 mg  4 mg IntraVENous Q4H PRN    diphenhydrAMINE (BENADRYL) injection 12.5 mg  12.5 mg IntraVENous Q4H PRN    magnesium hydroxide (MILK OF MAGNESIA) 400 mg/5 mL oral suspension 30 mL  30 mL Oral DAILY PRN    heparin (porcine) injection 5,000 Units  5,000 Units SubCUTAneous Q8H       Allergies: Morphine    Temp (24hrs), Av.6 °F (37 °C), Min:98.2 °F (36.8 °C), Max:99.2 °F (37.3 °C)    Visit Vitals    /67 (BP 1 Location: Left arm, BP Patient Position: At rest)    Pulse 68    Temp 98.2 °F (36.8 °C)    Resp 18    Wt 65.7 kg (144 lb 13.5 oz)    SpO2 96%    BMI 20.78 kg/m2       ROS: 12 point ROS obtained in details.  Pertinent positives as mentioned in HPI,   otherwise negative    Physical Exam:    General: Well developed, well nourished male laying on the bed AAOx3 in no acute distress.     General:   awake alert and oriented   HEENT:  Normocephalic, atraumatic, PERRL, EOMI, no scleral icterus or pallor; no conjunctival hemmohage;  nasal and oral mucous are moist and without evidence of lesions. No thrush. Neck supple, no bruits. Lymph Nodes:   no cervical, axillary or inguinal adenopathy   Lungs:   non-labored, bilaterally clear to auscultation- no crackles wheezes rales or rhonchi   Heart:  RRR, s1 and s2; no  rubs or gallops, no edema, + pedal pulses   Abdomen:  soft, non-distended, active bowel sounds, no hepatomegaly, no splenomegaly. dressing right lower abdomen, no erythema   Genitourinary:  deferred   Extremities:   no clubbing, cyanosis; no joint effusions or swelling; drain left groin; muscle mass appropriate for age, left aka stump covered with dressing   Neurologic:  No gross focal sensory abnormalities; 5/5 muscle strength to upper and lower extremities. Speech appropriate. Cranial nerves intact                        Skin:  Surgical changes LLE, abdominal wall   Back:  no spinal or paraspinal muscle tenderness or rigidity, no CVA tenderness   Psychiatric:  No suicidal or homicidal ideations, appropriate mood and affect          Labs: Results:   Chemistry Recent Labs      08/24/17   0300  08/23/17   0550  08/22/17   0550   GLU  92  93  103*   NA  133*  135*  134*   K  4.3  3.5  4.1   CL  98*  101  101   CO2  29  28  25   BUN  7  6*  7   CREA  0.50*  0.50*  0.55*   CA  8.7  8.7  8.1*   AGAP  6  6  8   BUCR  14  12  13   AP   --   718*  734*   TP   --   6.9  6.7   ALB   --   2.1*  2.3*   GLOB   --   4.8*  4.4*   AGRAT   --   0.4*  0.5*      CBC w/Diff Recent Labs      08/24/17   0300  08/23/17   0550  08/22/17   0550   WBC  11.6  11.6  14.5*   RBC  2.96*  2.84*  2.65*   HGB  8.4*  8.1*  7.6*   HCT  25.6*  24.6*  23.1*   PLT  660*  567*  513*   GRANS  70  74*  78*   LYMPH  19*  14*  12*   EOS  1  1  1      Microbiology No results for input(s): CULT in the last 72 hours.        RADIOLOGY:    All available imaging studies/reports in [facility] for this admission were reviewed    Dr. Anthony Avila, Infectious Disease Specialist  [phone]  August 24, 2017  12:28 PM

## 2017-08-24 NOTE — ROUTINE PROCESS
Bedside and Verbal shift change report given to King Eleanor (oncoming nurse) by Marisol Matt RN (offgoing nurse). Report included the following information SBAR, Kardex, MAR and Recent Results.     SITUATION:    Code Status: Full Code   Reason for Admission: Ischemic Rest Pain of Left Lower Extremity   dx   Atherosclerosis of native artery of both lower extremities with intermittent claudication (HCC)   PAD (peripheral artery disease) ([facility]   Infected prosthetic vascular graft, sequela    [facility] day: 7   Problem List:       Hospital Problems  Date Reviewed: 8/18/2017          Codes Class Noted POA    Acute metabolic encephalopathy DWQ-[id number]: G93.41  ICD-9-CM: 348.31  8/21/2017 No        Prolonged Q-T interval on ECG ICD-10-CM: R94.31  ICD-9-CM: 794.31  8/19/2017 Yes        Elevated lactic acid level ICD-10-CM: R79.89  ICD-9-CM: 276.2  8/19/2017 No        Leukocytosis ICD-10-CM: D72.829  ICD-9-CM: 288.60  8/19/2017 No        Adverse effect of amiodarone ICD-10-CM: F16.5J9G  ICD-9-CM: E942.0  8/19/2017 Yes        Status post above knee amputation of left lower extremity ([facility] ICD-10-CM: T99.932  ICD-9-CM: V49.76  8/18/2017 No    Overview Signed 8/21/2017 10:21 PM by Griselda Kennedy MD     S/P Left above-the-knee amputation (8/18/2017 - Dr. Jennifer Kent)               Aftercare for amputation stump ICD-10-CM: Z47.81  ICD-9-CM: V54.89  8/18/2017 No    Overview Signed 8/21/2017 10:25 PM by Griselda Kennedy MD     S/P Left above-the-knee amputation (8/18/2017 - Dr. Jennifer Kent)             * (Principal)Ischemic rest pain of lower extremity [facility]) ICD-10-CM: I73.9  ICD-9-CM: 443.9  8/14/2017 Yes    Overview Signed 8/15/2017 11:09 AM by Griselda Kennedy MD     Left             Hyponatremia ICD-10-CM: E87.1  ICD-9-CM: 276.1  8/8/2017 Yes        Acute blood loss as cause of postoperative anemia ICD-10-CM: D62  ICD-9-CM: 285.1  7/25/2017 Yes        Aftercare following surgery of the circulatory system ICD-10-CM: H70.560  ICD-9-CM: V58.73  7/25/2017 Yes    Overview Addendum 8/21/2017 10:22 PM by Riya Noe MD     S/P Removal of infected graft; Repair of left superficial femoral artery; Repair of left common femoral artery (7/25/2017 - Dr. Lisa Orozco); S/P Right axillary femoral jump bypass interposition; Removal of infected right axillary femoral bypass; Wound VAC placement of upper thigh 1.2 cm x 5.2 cm x 1.8 cm and groin 4 cm x 0.5 cm x 0.2 cm (8/18/2017 - Dr. Lisa Orozco)             Impaired mobility and ADLs ICD-10-CM: Z74.09  ICD-9-CM: 799.89  7/24/2017 Yes        Infection of vascular bypass graft ([address] ICD-10-CM: T82. 7XXA  ICD-9-CM: 996.62  7/24/2017 Yes    Overview Signed 8/7/2017  2:19 PM by Riya Noe MD     Left lower extremity             Sepsis associated with internal vascular access [facility]) ICD-10-CM: T82. 7XXA, A41.9  ICD-9-CM: 996.62, 038.9  7/24/2017 Yes        Ischemic cardiomyopathy (Chronic) ICD-10-CM: I25.5  ICD-9-CM: 414.8  Unknown Yes        Chronic systolic heart failure (HCC) (Chronic) ICD-10-CM: I50.22  ICD-9-CM: 428.22  Unknown Yes        Paroxysmal atrial fibrillation (HCC) ICD-10-CM: I48.0  ICD-9-CM: 427.31  Unknown Yes        Coronary artery disease involving native coronary artery of native heart (Chronic) ICD-10-CM: I25.10  ICD-9-CM: 414.01  3/15/2017 Yes    Overview Signed 3/15/2017  2:19 PM by JUNE Lind     Successful stenting of Cx (Xience LESLEY) and RCA (Xience LESLEY) to 0% by Dr. Domonique Puente on 3/15/17.              Atherosclerosis of native artery of both lower extremities with intermittent claudication (HCC) (Chronic) ICD-10-CM: A30.425  ICD-9-CM: 440.21  1/25/2017 Yes        Peripheral artery disease (HCC) (Chronic) ICD-10-CM: I73.9  ICD-9-CM: 443.9  1/25/2017 Yes        Benign hypertensive heart disease with systolic congestive heart failure, NYHA class 2 (HCC) (Chronic) ICD-10-CM: I11.0, I50.20  ICD-9-CM: 402.11, 428.20, 428.0  1/26/2015 Yes BACKGROUND:    Past Medical History:   Past Medical History:   Diagnosis Date    Acute blood loss as cause of postoperative anemia 4/4/2017    Anticoagulated on Coumadin     Aortoiliac occlusive disease (HCC) 1/25/2017    Atherosclerosis of native artery of both lower extremities with intermittent claudication ([address] 1/25/2017    Benign hypertensive heart disease with systolic congestive heart failure, NYHA class 2 ([address] 1/26/2015    Carotid artery disease (HCC)     Chronic anemia     Chronic systolic heart failure (HCC)     Chronic ulcer of right foot ([address] 4/4/2017    Chronic ulcer of right heel ([address] 8/8/2017    Coronary artery disease involving native coronary artery of native heart 3/15/2017    Successful stenting of Cx (Xience LESLEY) and RCA (Xience LESLEY) to 0% by Dr. Domonique Puente on 3/15/17.     DDD (degenerative disc disease), lumbar 1/26/2015    Dyslipidemia     Erectile dysfunction 7/5/2016    Euthyroid sick syndrome 4/6/2017    Hereditary peripheral neuropathy 11/15/2016    History of cardioversion 4/18/2017    S/P Synchronized external cardioversion (4/18/2017 - Dr. Anand Massey)    History of vitamin D deficiency 4/22/2017    Vitamin D 25-Hydroxy (4/22/2017) = 12.0; Vitamin D 25-Hydroxy (5/26/2017) = 38.7    Infection of vascular bypass graft ([facility] 7/24/2017    Left lower extremity    Ischemic cardiomyopathy     Moderate to severe pulmonary hypertension ([address] 7/23/2017    Paroxysmal atrial fibrillation (HCC)     Peripheral artery disease ([facility] [address] [facility] 1/25/2017    Spinal stenosis of lumbar region with radiculopathy 5/4/2015    Dr. Katiuska Gómez         Patient taking anticoagulants yes     ASSESSMENT:    Changes in Assessment Throughout Shift: none     Patient has Central Line: no Reasons if yes:    Patient has Jiang Cath: no Reasons if yes:       Last Vitals:     Vitals:    08/23/17 1605 08/23/17 1757 08/23/17 1957 08/24/17 0453   BP: 136/64 146/70 137/63 157/78   Pulse: 60 73 72 65   Resp: 18 18 18 18   Temp:  99.2 °F (37.3 °C) 99.1 °F (37.3 °C) 98.2 °F (36.8 °C)   SpO2: 100% 98% 97% 100%   Weight:            IV and DRAINS (will only show if present)   Vacuum Assisted Closure Left Groin-Site Assessment: Clean, dry, & intact  [REMOVED] Peripheral IV Left Wrist-Site Assessment: Clean, dry, & intact  [REMOVED] Peripheral IV 08/18/17 Left Arm-Site Assessment: Intact, Drainage (comment)  [REMOVED] Peripheral IV 08/18/17 Right Arm-Site Assessment: Clean, dry, & intact  [REMOVED] Arterial Line 08/18/17 Left Radial artery-Site Assessment: Clean, dry, & intact  [REMOVED] Peripheral IV 08/20/17 Right Hand-Site Assessment: Clean, dry, & intact     WOUND (if present)   Wound Type:  Stump/wound-vac   Dressing present Dressing Present : Intact, not due to be changed   Wound Concerns/Notes:  none     PAIN    Pain Assessment    Pain Intensity 1: 4 (08/24/17 0525)    Pain Location 1: Leg    Pain Intervention(s) 1: Medication (see MAR)    Patient Stated Pain Goal: 2  o Interventions for Pain:  See mar  o Intervention effective: yes  o Time of last intervention: see mar   o Reassessment Completed: yes      Last 3 Weights:  Last 3 Recorded Weights in this Encounter    08/21/17 0217 08/22/17 0321 08/23/17 0551   Weight: 63.1 kg (139 lb 1.8 oz) 67.5 kg (148 lb 13 oz) 65.7 kg (144 lb 13.5 oz)     Weight change:      INTAKE/OUPUT    Current Shift: 08/24 0701 - 08/24 1900  In: -   Out: 400 [Urine:400]    Last three shifts: 08/22 1901 - 08/24 0700  In: 1860 [P.O.:1360; I.V.:500]  Out: 1279 [Urine:3895]     LAB RESULTS     Recent Labs      08/24/17   0300  08/23/17   0550  08/22/17   0550   WBC  11.6  11.6  14.5*   HGB  8.4*  8.1*  7.6*   HCT  25.6*  24.6*  23.1*   PLT  660*  567*  513*        Recent Labs      08/24/17   0300  08/23/17   0550  08/22/17   0550   NA  133*  135*  134*   K  4.3  3.5  4.1   GLU  92  93  103*   BUN  7  6*  7   CREA  0.50*  0.50*  0.55*   CA  8.7  8.7  8.1*   MG  1.8  1.7   -- RECOMMENDATIONS AND DISCHARGE PLANNING     1. Pending tests/procedures/ Plan of Care or Other Needs: none     2. Discharge plan for patient and Needs/Barriers: home/rehab    3. Estimated Discharge Date: unknown Posted on Whiteboard in Patients Room: no      4. The patient's care plan was reviewed with the oncoming nurse. \"HEALS\" SAFETY CHECK      Fall Risk    Total Score: 3    Safety Measures: Safety Measures: Bed/Chair-Wheels locked, Bed in low position, Call light within reach, Side rails X 3    A safety check occurred in the patient's room between off going nurse and oncoming nurse listed above. The safety check included the below items  Area Items   H  High Alert Medications - Verify all high alert medication drips (heparin, PCA, etc.)   E  Equipment - Suction is set up for ALL patients (with cailin)  - Red plugs utilized for all equipment (IV pumps, etc.)  - WOWs wiped down at end of shift.  - Room stocked with oxygen, suction, and other unit-specific supplies   A  Alarms - Bed alarm is set for fall risk patients  - Ensure chair alarm is in place and activated if patient is up in a chair   L  Lines - Check IV for any infiltration  - Jiang bag is empty if patient has a Jiang   - Tubing and IV bags are labeled   S  Safety   - Room is clean, patient is clean, and equipment is clean. - Hallways are clear from equipment besides carts. - Fall bracelet on for fall risk patients  - Ensure room is clear and free of clutter  - Suction is set up for ALL patients (with cailin)  - Hallways are clear from equipment besides carts.    - Isolation precautions followed, supplies available outside room, sign posted     Nando Rabago RN

## 2017-08-24 NOTE — PROGRESS NOTES
Problem: Self Care Deficits Care Plan (Adult)  Goal: *Acute Goals and Plan of Care (Insert Text)  Occupational Therapy Goals  Initiated 8/22/2017 within 7 day(s). 1. Patient will perform grooming with supervision/set-up   2. Patient will perform bathing with minimal assistance/contact guard assist.  3. Patient will perform lower body dressing with minimal assistance/contact guard assist.  4. Patient will perform toilet transfers with minimal assistance/contact guard assist.  5. Patient will perform all aspects of toileting with minimal assistance/contact guard assist.   Outcome: Progressing Towards Goal  OCCUPATIONAL THERAPY TREATMENT     Patient: Anson King (58 y.o. male)  Date: 8/24/2017  Diagnosis: Ischemic Rest Pain of Left Lower Extremity  dx  Atherosclerosis of native artery of both lower extremities with intermittent claudication (HCC)  PAD (peripheral artery disease) (HCC)  Infected prosthetic vascular graft, sequela Ischemic rest pain of lower extremity (HCC)  Procedure(s) (LRB):  removal of right infected graft with jump bypass (Right)  AMPUTATION KNEE(AKA)-left leg (Left) 6 Days Post-Op  Precautions: Fall, Skin (new L AKA)  Chart, occupational therapy assessment, plan of care, and goals were reviewed. ASSESSMENT:  Pt presented supine in bed agreeable to skilled OT services this date. Pt maneuvered supine <> EOB Min A/CGA. Pt doffed sock on R foot CGA, and donned sock on R foot by raising foot up to knee level CGA. Pt then challenged dynamic sitting balance by lowering onto R forearm and raising back up x5 trials and lowering onto L forearm x5 trials for carryover to all aspects of toileting. Pt left comfortable in bed with call bell within reach.   Progression toward goals:  [X]          Improving appropriately and progressing toward goals  [ ]          Improving slowly and progressing toward goals  [ ]          Not making progress toward goals and plan of care will be adjusted PLAN:  Patient continues to benefit from skilled intervention to address the above impairments. Continue treatment per established plan of care. Discharge Recommendations:  Inpatient Rehab  Further Equipment Recommendations for Discharge:  TBD      G-CODES:      Self Care  Current  CK= 40-59%. The severity rating is based on the Level of Assistance required for Functional Mobility and ADLs. SUBJECTIVE:   Patient stated It could be worse.       OBJECTIVE DATA SUMMARY:   Cognitive/Behavioral Status:  Neurologic State: Alert  Orientation Level: Oriented X4  Cognition: Follows commands, Appropriate safety awareness, Appropriate for age attention/concentration, Appropriate decision making  Safety/Judgement: Awareness of environment, Fall prevention, Insight into deficits     Functional Mobility and Transfers for ADLs:              Bed Mobility:  Rolling: Minimum assistance;Contact guard assistance  Supine to Sit: Minimum assistance;Contact guard assistance  Sit to Supine: Minimum assistance;Contact guard assistance                   Balance:  Sitting: Impaired; With support  Sitting - Static: Good (unsupported)  Sitting - Dynamic: Fair (occasional)     ADL Intervention:     Lower Body Dressing Assistance  Socks: Contact guard assistance     Pain:  Pt reports 5/10 pain or discomfort prior to treatment. Pt reports 5/10 pain or discomfort post treatment. Activity Tolerance:    Fair     Please refer to the flowsheet for vital signs taken during this treatment.   After treatment:   [ ]  Patient left in no apparent distress sitting up in chair  [X]  Patient left in no apparent distress in bed  [X]  Call bell left within reach  [X]  Nursing notified  [ ]  Caregiver present  [ ]  Bed alarm activated        LEA Hodges  Time Calculation: 18 mins

## 2017-08-24 NOTE — PROGRESS NOTES
ARU/IPR REFERRAL CONTACT NOTE  [address] [place] [address] for Physical Rehabilitation    RE: Smitha Saul    Referral received to review this patient's case for admission to [address] [place] [address] for Physical Rehabilitation. Current status reviewed with team and patient meets criteria for admission to [facility] for Physical Rehabilitation pending authorization from [facility]. Case has been opened with [facility].; and is pending review. Writer will notify  of status/determination once obtained. Thank you for this referral.  Should you have any questions please do not hesitate to call. Sincerely,  Larry Morris. BEAU Smith  [facility] Pending sale to [facility]  [phone]

## 2017-08-24 NOTE — PROGRESS NOTES
Spoke to Bee in [facility] who states that they will submit for authorization from [employer] [facility] with an expected admission to [facility] for tomorrow.

## 2017-08-24 NOTE — PROGRESS NOTES
ARU/IPR REFERRAL CONTACT NOTE  [id number] [place] [address] for Physical Rehabilitation    RE:     Thank you for the opportunity to review this patient's case for admission to [id number] [place] [address] for Physical Rehabilitation. Based on our pre-admission screening:     [x ] This patient meets criteria for admission to [facility] for Physical      Rehabilitation. Arsh Koehler, care manager, notified of patient's acceptance to ARU by admin team.    This author spoke with patient who states he wishes to return to ARU upon discharge from acute care facility. Will submit to insurance today requesting authorization for inpatient rehab. Again, Thank you for this referral. Should you have any questions please do not hesitate to call.      Sincerely,  Sapna Echeverria, [address] for Physical Rehabilitation  [phone]

## 2017-08-24 NOTE — PROCEDURES
INTERVENTIONAL RADIOLOGY POST PICC LINE NOTE       August 24, 2017       11:04 AM     Preoperative Diagnosis:   IV Access    Postoperative Diagnosis:  Same. : Bhavani Reed PA-C    Assistant:  None. Attending Physician: Chioma Kolb MD    Type of Anesthesia:  1% Lidocaine local    Procedure/Description: right basilic  upper extremity PICC Line. Findings:  right basilic 5 Amharic catheter placed. Tip at SVC/RA junction. OK to use. Length 42 cm. Estimated blood Loss: Minimal    Specimen Removed: None    Drains: None     Complications:  None. Condition:  Stable.     Discharge Plan:  continue present therapy

## 2017-08-24 NOTE — PROGRESS NOTES
ARU/IPR REFERRAL CONTACT NOTE  [address] [place] [address] for Physical Rehabilitation          RE:  Meredith Danteut     Thank you for the opportunity to review this patient's case for admission to [address] [place] [address] for Physical Rehabilitation. Based on our pre-admission screening patient meets criteria for admission to [facility] for Physical Rehabilitation. We did receive authorization from [employer]. Plan for admission Friday 8/25/17 to room _191_____ at __10:30pm____. Will need d/c summary/med rec completed and report called to [phone] prior to patient's arrival.    Again, Thank you for this referral. Should you have any questions please do not hesitate to call. Sincerely,  Myranda Ferreira.  [address] for Physical Rehabilitation  [phone]

## 2017-08-25 ENCOUNTER — HOSPITAL ENCOUNTER (INPATIENT)
Age: 57
LOS: 14 days | Discharge: HOME HEALTH | DRG: 516 | End: 2017-09-08
Attending: INTERNAL MEDICINE | Admitting: INTERNAL MEDICINE
Payer: COMMERCIAL

## 2017-08-25 VITALS
OXYGEN SATURATION: 100 % | TEMPERATURE: 97.9 F | BODY MASS INDEX: 22.38 KG/M2 | RESPIRATION RATE: 18 BRPM | DIASTOLIC BLOOD PRESSURE: 67 MMHG | SYSTOLIC BLOOD PRESSURE: 148 MMHG | HEART RATE: 67 BPM | WEIGHT: 156 LBS

## 2017-08-25 DIAGNOSIS — Z89.612 STATUS POST ABOVE KNEE AMPUTATION OF LEFT LOWER EXTREMITY: Primary | ICD-10-CM

## 2017-08-25 DIAGNOSIS — I11.0 BENIGN HYPERTENSIVE HEART DISEASE WITH SYSTOLIC CONGESTIVE HEART FAILURE, NYHA CLASS 2 (HCC): Chronic | ICD-10-CM

## 2017-08-25 DIAGNOSIS — T82.7XXD INFECTION OF VASCULAR BYPASS GRAFT, SUBSEQUENT ENCOUNTER: ICD-10-CM

## 2017-08-25 DIAGNOSIS — I48.0 PAROXYSMAL ATRIAL FIBRILLATION (HCC): ICD-10-CM

## 2017-08-25 DIAGNOSIS — G60.9 HEREDITARY PERIPHERAL NEUROPATHY: Chronic | ICD-10-CM

## 2017-08-25 DIAGNOSIS — I50.22 CHRONIC SYSTOLIC HEART FAILURE (HCC): Chronic | ICD-10-CM

## 2017-08-25 DIAGNOSIS — I50.20 BENIGN HYPERTENSIVE HEART DISEASE WITH SYSTOLIC CONGESTIVE HEART FAILURE, NYHA CLASS 2 (HCC): Chronic | ICD-10-CM

## 2017-08-25 DIAGNOSIS — Z79.01 ANTICOAGULATED ON COUMADIN: Chronic | ICD-10-CM

## 2017-08-25 LAB
BACTERIA SPEC CULT: NORMAL
BACTERIA SPEC CULT: NORMAL
BASOPHILS # BLD: 0.1 K/UL (ref 0–0.1)
BASOPHILS NFR BLD: 0 % (ref 0–2)
DIFFERENTIAL METHOD BLD: ABNORMAL
EOSINOPHIL # BLD: 0.1 K/UL (ref 0–0.4)
EOSINOPHIL NFR BLD: 1 % (ref 0–5)
ERYTHROCYTE [DISTWIDTH] IN BLOOD BY AUTOMATED COUNT: 17.8 % (ref 11.6–14.5)
HCT VFR BLD AUTO: 25.1 % (ref 36–48)
HGB BLD-MCNC: 8.2 G/DL (ref 13–16)
LYMPHOCYTES # BLD: 1.9 K/UL (ref 0.9–3.6)
LYMPHOCYTES NFR BLD: 16 % (ref 21–52)
MAGNESIUM SERPL-MCNC: 1.9 MG/DL (ref 1.6–2.6)
MCH RBC QN AUTO: 28.2 PG (ref 24–34)
MCHC RBC AUTO-ENTMCNC: 32.7 G/DL (ref 31–37)
MCV RBC AUTO: 86.3 FL (ref 74–97)
MONOCYTES # BLD: 1.2 K/UL (ref 0.05–1.2)
MONOCYTES NFR BLD: 10 % (ref 3–10)
NEUTS SEG # BLD: 8.7 K/UL (ref 1.8–8)
NEUTS SEG NFR BLD: 73 % (ref 40–73)
PLATELET # BLD AUTO: 707 K/UL (ref 135–420)
PMV BLD AUTO: 9.1 FL (ref 9.2–11.8)
RBC # BLD AUTO: 2.91 M/UL (ref 4.7–5.5)
SERVICE CMNT-IMP: NORMAL
SERVICE CMNT-IMP: NORMAL
WBC # BLD AUTO: 11.9 K/UL (ref 4.6–13.2)

## 2017-08-25 PROCEDURE — 85025 COMPLETE CBC W/AUTO DIFF WBC: CPT | Performed by: INTERNAL MEDICINE

## 2017-08-25 PROCEDURE — 83735 ASSAY OF MAGNESIUM: CPT | Performed by: INTERNAL MEDICINE

## 2017-08-25 PROCEDURE — 74011000258 HC RX REV CODE- 258: Performed by: INTERNAL MEDICINE

## 2017-08-25 PROCEDURE — 74011250637 HC RX REV CODE- 250/637: Performed by: INTERNAL MEDICINE

## 2017-08-25 PROCEDURE — 74011250636 HC RX REV CODE- 250/636: Performed by: INTERNAL MEDICINE

## 2017-08-25 PROCEDURE — 36592 COLLECT BLOOD FROM PICC: CPT

## 2017-08-25 PROCEDURE — 74011250637 HC RX REV CODE- 250/637: Performed by: SURGERY

## 2017-08-25 PROCEDURE — 77030011256 HC DRSG MEPILEX <16IN NO BORD MOLN -A

## 2017-08-25 PROCEDURE — 77030019934 HC DRSG VAC ASST KCON -B

## 2017-08-25 PROCEDURE — 74011250636 HC RX REV CODE- 250/636: Performed by: SURGERY

## 2017-08-25 PROCEDURE — 65310000000 HC RM PRIVATE REHAB

## 2017-08-25 PROCEDURE — 77030018836 HC SOL IRR NACL ICUM -A

## 2017-08-25 RX ORDER — BISACODYL 5 MG
10 TABLET, DELAYED RELEASE (ENTERIC COATED) ORAL
Status: DISCONTINUED | OUTPATIENT
Start: 2017-08-25 | End: 2017-09-08 | Stop reason: HOSPADM

## 2017-08-25 RX ORDER — ASCORBIC ACID 250 MG
250 TABLET ORAL
Status: DISCONTINUED | OUTPATIENT
Start: 2017-08-26 | End: 2017-09-08 | Stop reason: HOSPADM

## 2017-08-25 RX ORDER — AMIODARONE HYDROCHLORIDE 200 MG/1
200 TABLET ORAL DAILY
Status: DISCONTINUED | OUTPATIENT
Start: 2017-08-26 | End: 2017-09-08 | Stop reason: HOSPADM

## 2017-08-25 RX ORDER — ACETAMINOPHEN 325 MG/1
650 TABLET ORAL
Status: DISCONTINUED | OUTPATIENT
Start: 2017-08-25 | End: 2017-09-07 | Stop reason: SDUPTHER

## 2017-08-25 RX ORDER — LOSARTAN POTASSIUM 50 MG/1
50 TABLET ORAL DAILY
Status: DISCONTINUED | OUTPATIENT
Start: 2017-08-26 | End: 2017-08-26

## 2017-08-25 RX ORDER — WARFARIN 7.5 MG/1
7.5 TABLET ORAL ONCE
Status: COMPLETED | OUTPATIENT
Start: 2017-08-25 | End: 2017-08-25

## 2017-08-25 RX ORDER — POLYETHYLENE GLYCOL 3350 17 G/17G
17 POWDER, FOR SOLUTION ORAL DAILY
Status: DISCONTINUED | OUTPATIENT
Start: 2017-08-25 | End: 2017-09-08 | Stop reason: HOSPADM

## 2017-08-25 RX ORDER — GABAPENTIN 300 MG/1
300 CAPSULE ORAL EVERY 8 HOURS
Status: DISCONTINUED | OUTPATIENT
Start: 2017-08-25 | End: 2017-09-08 | Stop reason: HOSPADM

## 2017-08-25 RX ORDER — LANOLIN ALCOHOL/MO/W.PET/CERES
400 CREAM (GRAM) TOPICAL DAILY
Status: DISCONTINUED | OUTPATIENT
Start: 2017-08-26 | End: 2017-09-08 | Stop reason: HOSPADM

## 2017-08-25 RX ORDER — HYDROMORPHONE HYDROCHLORIDE 2 MG/1
2-4 TABLET ORAL
Status: DISCONTINUED | OUTPATIENT
Start: 2017-08-25 | End: 2017-09-07 | Stop reason: SDUPTHER

## 2017-08-25 RX ORDER — HEPARIN SODIUM 5000 [USP'U]/ML
5000 INJECTION, SOLUTION INTRAVENOUS; SUBCUTANEOUS EVERY 8 HOURS
Status: DISCONTINUED | OUTPATIENT
Start: 2017-08-25 | End: 2017-08-30

## 2017-08-25 RX ORDER — WARFARIN SODIUM 5 MG/1
5 TABLET ORAL
Qty: 30 TAB | Refills: 0 | Status: ON HOLD
Start: 2017-08-25 | End: 2017-09-07

## 2017-08-25 RX ORDER — WARFARIN SODIUM 5 MG/1
5 TABLET ORAL
Status: DISCONTINUED | OUTPATIENT
Start: 2017-08-25 | End: 2017-08-25 | Stop reason: HOSPADM

## 2017-08-25 RX ORDER — LANOLIN ALCOHOL/MO/W.PET/CERES
1000 CREAM (GRAM) TOPICAL DAILY
Status: DISCONTINUED | OUTPATIENT
Start: 2017-08-26 | End: 2017-09-08 | Stop reason: HOSPADM

## 2017-08-25 RX ORDER — MELATONIN
2000 DAILY
Status: DISCONTINUED | OUTPATIENT
Start: 2017-08-26 | End: 2017-09-08 | Stop reason: HOSPADM

## 2017-08-25 RX ORDER — METOPROLOL TARTRATE 25 MG/1
25 TABLET, FILM COATED ORAL EVERY 12 HOURS
Status: DISCONTINUED | OUTPATIENT
Start: 2017-08-25 | End: 2017-09-08 | Stop reason: HOSPADM

## 2017-08-25 RX ORDER — ATORVASTATIN CALCIUM 40 MG/1
40 TABLET, FILM COATED ORAL
Status: DISCONTINUED | OUTPATIENT
Start: 2017-08-25 | End: 2017-09-08 | Stop reason: HOSPADM

## 2017-08-25 RX ORDER — LANOLIN ALCOHOL/MO/W.PET/CERES
1 CREAM (GRAM) TOPICAL
Status: DISCONTINUED | OUTPATIENT
Start: 2017-08-26 | End: 2017-09-08 | Stop reason: HOSPADM

## 2017-08-25 RX ORDER — DOCUSATE SODIUM 100 MG/1
100 CAPSULE, LIQUID FILLED ORAL 2 TIMES DAILY
Status: DISCONTINUED | OUTPATIENT
Start: 2017-08-25 | End: 2017-09-08 | Stop reason: HOSPADM

## 2017-08-25 RX ORDER — GUAIFENESIN 100 MG/5ML
81 LIQUID (ML) ORAL
Status: DISCONTINUED | OUTPATIENT
Start: 2017-08-26 | End: 2017-09-08 | Stop reason: HOSPADM

## 2017-08-25 RX ORDER — FUROSEMIDE 20 MG/1
20 TABLET ORAL DAILY
Status: DISCONTINUED | OUTPATIENT
Start: 2017-08-26 | End: 2017-09-08 | Stop reason: HOSPADM

## 2017-08-25 RX ORDER — VANCOMYCIN HYDROCHLORIDE 1 G/20ML
INJECTION, POWDER, LYOPHILIZED, FOR SOLUTION INTRAVENOUS
Qty: 1000 MG | Refills: 0 | Status: ON HOLD
Start: 2017-08-25 | End: 2017-09-07 | Stop reason: CLARIF

## 2017-08-25 RX ORDER — POTASSIUM CHLORIDE 1.5 G/1.77G
20 POWDER, FOR SOLUTION ORAL DAILY
Status: DISCONTINUED | OUTPATIENT
Start: 2017-08-26 | End: 2017-09-08 | Stop reason: HOSPADM

## 2017-08-25 RX ORDER — ZINC SULFATE 50(220)MG
1 CAPSULE ORAL DAILY
Status: DISCONTINUED | OUTPATIENT
Start: 2017-08-26 | End: 2017-09-08 | Stop reason: HOSPADM

## 2017-08-25 RX ADMIN — DOCUSATE SODIUM 100 MG: 100 CAPSULE, LIQUID FILLED ORAL at 09:13

## 2017-08-25 RX ADMIN — POLYETHYLENE GLYCOL 3350 17 G: 17 POWDER, FOR SOLUTION ORAL at 09:13

## 2017-08-25 RX ADMIN — DOCUSATE SODIUM 100 MG: 100 CAPSULE, LIQUID FILLED ORAL at 18:00

## 2017-08-25 RX ADMIN — ASPIRIN 81 MG 81 MG: 81 TABLET ORAL at 09:13

## 2017-08-25 RX ADMIN — HEPARIN SODIUM 5000 UNITS: 5000 INJECTION, SOLUTION INTRAVENOUS; SUBCUTANEOUS at 04:23

## 2017-08-25 RX ADMIN — GABAPENTIN 300 MG: 300 CAPSULE ORAL at 14:46

## 2017-08-25 RX ADMIN — HYDROMORPHONE HYDROCHLORIDE 4 MG: 2 TABLET ORAL at 09:34

## 2017-08-25 RX ADMIN — FUROSEMIDE 20 MG: 20 TABLET ORAL at 09:12

## 2017-08-25 RX ADMIN — Medication 325 MG: at 09:12

## 2017-08-25 RX ADMIN — AMPICILLIN AND SULBACTAM 3 G: 1; 2 INJECTION, POWDER, FOR SOLUTION INTRAMUSCULAR; INTRAVENOUS at 22:50

## 2017-08-25 RX ADMIN — AMPICILLIN AND SULBACTAM 3 G: 1; 2 INJECTION, POWDER, FOR SOLUTION INTRAMUSCULAR; INTRAVENOUS at 10:28

## 2017-08-25 RX ADMIN — WARFARIN SODIUM 7.5 MG: 7.5 TABLET ORAL at 18:00

## 2017-08-25 RX ADMIN — Medication 1 CAPSULE: at 09:35

## 2017-08-25 RX ADMIN — HYDROMORPHONE HYDROCHLORIDE 4 MG: 2 TABLET ORAL at 03:15

## 2017-08-25 RX ADMIN — METOPROLOL TARTRATE 25 MG: 25 TABLET ORAL at 09:13

## 2017-08-25 RX ADMIN — AMPICILLIN AND SULBACTAM 3 G: 1; 2 INJECTION, POWDER, FOR SOLUTION INTRAMUSCULAR; INTRAVENOUS at 16:51

## 2017-08-25 RX ADMIN — GABAPENTIN 300 MG: 300 CAPSULE ORAL at 06:46

## 2017-08-25 RX ADMIN — AMPICILLIN AND SULBACTAM 3 G: 1; 2 INJECTION, POWDER, FOR SOLUTION INTRAMUSCULAR; INTRAVENOUS at 04:23

## 2017-08-25 RX ADMIN — ZINC SULFATE 220 MG (50 MG) CAPSULE 220 MG: CAPSULE at 09:35

## 2017-08-25 RX ADMIN — ATORVASTATIN CALCIUM 40 MG: 40 TABLET, FILM COATED ORAL at 22:48

## 2017-08-25 RX ADMIN — VITAMIN D, TAB 1000IU (100/BT) 1000 UNITS: 25 TAB at 09:13

## 2017-08-25 RX ADMIN — GABAPENTIN 300 MG: 300 CAPSULE ORAL at 22:48

## 2017-08-25 RX ADMIN — CYANOCOBALAMIN TAB 1000 MCG 1000 MCG: 1000 TAB at 09:13

## 2017-08-25 RX ADMIN — LOSARTAN POTASSIUM 50 MG: 50 TABLET, FILM COATED ORAL at 09:12

## 2017-08-25 RX ADMIN — AMIODARONE HYDROCHLORIDE 200 MG: 200 TABLET ORAL at 09:13

## 2017-08-25 RX ADMIN — POTASSIUM CHLORIDE 20 MEQ: 20 TABLET, EXTENDED RELEASE ORAL at 09:12

## 2017-08-25 RX ADMIN — HEPARIN SODIUM 5000 UNITS: 5000 INJECTION, SOLUTION INTRAVENOUS; SUBCUTANEOUS at 22:48

## 2017-08-25 RX ADMIN — Medication 250 MG: at 09:13

## 2017-08-25 RX ADMIN — METOPROLOL TARTRATE 25 MG: 25 TABLET, FILM COATED ORAL at 22:48

## 2017-08-25 RX ADMIN — Medication 10 ML: at 06:50

## 2017-08-25 RX ADMIN — Medication 400 MG: at 09:13

## 2017-08-25 NOTE — ROUTINE PROCESS
Bedside and Verbal shift change report given to Josue Santos, RN (oncoming nurse) by Yelena Santiago, BEAU (offgoing nurse). Report included the following information SBAR, Kardex, MAR and Recent Results.     SITUATION:    Code Status: Full Code  Reason for Admission: Ischemic Rest Pain of Left Lower Extremity  dx  Atherosclerosis of native artery of both lower extremities with intermittent claudication (HCC)  PAD (peripheral artery disease) ([facility] [address]   Infected prosthetic vascular graft, sequela    [facility] day: 8   Problem List:       Hospital Problems  Date Reviewed: 8/18/2017          Codes Class Noted POA    Acute metabolic encephalopathy MTW-[id number]: G93.41  ICD-9-CM: 348.31  8/21/2017 No        Prolonged Q-T interval on ECG ICD-10-CM: R94.31  ICD-9-CM: 794.31  8/19/2017 Yes        Elevated lactic acid level ICD-10-CM: R79.89  ICD-9-CM: 276.2  8/19/2017 No        Leukocytosis ICD-10-CM: D72.829  ICD-9-CM: 288.60  8/19/2017 No        Adverse effect of amiodarone ICD-10-CM: D18.1H5S  ICD-9-CM: E942.0  8/19/2017 Yes        Status post above knee amputation of left lower extremity ([facility] [address] ICD-10-CM: K81.856  ICD-9-CM: V49.76  8/18/2017 No    Overview Signed 8/21/2017 10:21 PM by Riya Noe MD     S/P Left above-the-knee amputation (8/18/2017 - Dr. Lisa Orozco)               Aftercare for amputation stump ICD-10-CM: Z47.81  ICD-9-CM: V54.89  8/18/2017 No    Overview Signed 8/21/2017 10:25 PM by Riya Noe MD     S/P Left above-the-knee amputation (8/18/2017 - Dr. Lisa Orozco)             * (Principal)Ischemic rest pain of lower extremity [facility]) ICD-10-CM: I73.9  ICD-9-CM: 443.9  8/14/2017 Yes    Overview Signed 8/15/2017 11:09 AM by Riya Noe MD     Left             Hyponatremia ICD-10-CM: E87.1  ICD-9-CM: 276.1  8/8/2017 Yes        Acute blood loss as cause of postoperative anemia ICD-10-CM: D62  ICD-9-CM: 285.1  7/25/2017 Yes        Aftercare following surgery of the circulatory system ICD-10-CM: R18.320  ICD-9-CM: V58.73  7/25/2017 Yes    Overview Addendum 8/21/2017 10:22 PM by Riya Noe MD     S/P Removal of infected graft; Repair of left superficial femoral artery; Repair of left common femoral artery (7/25/2017 - Dr. Lisa Orozco); S/P Right axillary femoral jump bypass interposition; Removal of infected right axillary femoral bypass; Wound VAC placement of upper thigh 1.2 cm x 5.2 cm x 1.8 cm and groin 4 cm x 0.5 cm x 0.2 cm (8/18/2017 - Dr. Lisa Orozco)             Impaired mobility and ADLs ICD-10-CM: Z74.09  ICD-9-CM: 799.89  7/24/2017 Yes        Infection of vascular bypass graft ([address] ICD-10-CM: T82. 7XXA  ICD-9-CM: 996.62  7/24/2017 Yes    Overview Signed 8/7/2017  2:19 PM by Riya Noe MD     Left lower extremity             Sepsis associated with internal vascular access [facility]) ICD-10-CM: T82. 7XXA, A41.9  ICD-9-CM: 996.62, 038.9  7/24/2017 Yes        Ischemic cardiomyopathy (Chronic) ICD-10-CM: I25.5  ICD-9-CM: 414.8  Unknown Yes        Chronic systolic heart failure (HCC) (Chronic) ICD-10-CM: I50.22  ICD-9-CM: 428.22  Unknown Yes        Paroxysmal atrial fibrillation (HCC) ICD-10-CM: I48.0  ICD-9-CM: 427.31  Unknown Yes        Coronary artery disease involving native coronary artery of native heart (Chronic) ICD-10-CM: I25.10  ICD-9-CM: 414.01  3/15/2017 Yes    Overview Signed 3/15/2017  2:19 PM by JUNE Lind     Successful stenting of Cx (Xience LESLEY) and RCA (Xience LESLEY) to 0% by Dr. Domonique Puente on 3/15/17.              Atherosclerosis of native artery of both lower extremities with intermittent claudication (HCC) (Chronic) ICD-10-CM: H16.116  ICD-9-CM: 440.21  1/25/2017 Yes        Peripheral artery disease (HCC) (Chronic) ICD-10-CM: I73.9  ICD-9-CM: 443.9  1/25/2017 Yes        Benign hypertensive heart disease with systolic congestive heart failure, NYHA class 2 (HCC) (Chronic) ICD-10-CM: I11.0, I50.20  ICD-9-CM: 402.11, 428.20, 428.0  1/26/2015 Yes BACKGROUND:    Past Medical History:   Past Medical History:   Diagnosis Date    Acute blood loss as cause of postoperative anemia 4/4/2017    Anticoagulated on Coumadin     Aortoiliac occlusive disease (HCC) 1/25/2017    Atherosclerosis of native artery of both lower extremities with intermittent claudication ([address] 1/25/2017    Benign hypertensive heart disease with systolic congestive heart failure, NYHA class 2 ([address] 1/26/2015    Carotid artery disease (HCC)     Chronic anemia     Chronic systolic heart failure (HCC)     Chronic ulcer of right foot ([address] 4/4/2017    Chronic ulcer of right heel ([address] 8/8/2017    Coronary artery disease involving native coronary artery of native heart 3/15/2017    Successful stenting of Cx (Xience LESLEY) and RCA (Xience LESLEY) to 0% by Dr. Felix Roman on 3/15/17.     DDD (degenerative disc disease), lumbar 1/26/2015    Dyslipidemia     Erectile dysfunction 7/5/2016    Euthyroid sick syndrome 4/6/2017    Hereditary peripheral neuropathy 11/15/2016    History of cardioversion 4/18/2017    S/P Synchronized external cardioversion (4/18/2017 - Dr. Anne Nguyen)    History of vitamin D deficiency 4/22/2017    Vitamin D 25-Hydroxy (4/22/2017) = 12.0; Vitamin D 25-Hydroxy (5/26/2017) = 38.7    Infection of vascular bypass graft ([address] 7/24/2017    Left lower extremity    Ischemic cardiomyopathy     Moderate to severe pulmonary hypertension ([address] 7/23/2017    Paroxysmal atrial fibrillation ([address]     Peripheral artery disease ([address] 1/25/2017    Spinal stenosis of lumbar region with radiculopathy 5/4/2015    Dr. Evelio Lanier         Patient taking anticoagulants yes     ASSESSMENT:    Changes in Assessment Throughout Shift: none     Patient has Central Line: yes Reasons if yes: abx   Patient has Jiang Cath: no Reasons if yes:       Last Vitals:     Vitals:    08/24/17 1619 08/24/17 1938 08/24/17 2023 08/25/17 0530   BP: 135/80  157/74 144/78   Pulse: 62  67 65 Resp: 16 17 18   Temp: 97.7 °F (36.5 °C)  98 °F (36.7 °C) 97 °F (36.1 °C)   SpO2: 97%  100% 95%   Weight:  70.8 kg (156 lb)          IV and DRAINS (will only show if present)   [REMOVED] Peripheral IV 08/24/17 Left Forearm-Site Assessment: Clean, dry, & intact  PICC Double Lumen 55/82/17 Right;Basilic-Site Assessment: Clean, dry, & intact  Vacuum Assisted Closure Left Groin-Site Assessment: Clean, dry, & intact  [REMOVED] Peripheral IV Left Wrist-Site Assessment: Clean, dry, & intact  [REMOVED] Peripheral IV 08/18/17 Left Arm-Site Assessment: Intact, Drainage (comment)  [REMOVED] Peripheral IV 08/18/17 Right Arm-Site Assessment: Clean, dry, & intact  [REMOVED] Arterial Line 08/18/17 Left Radial artery-Site Assessment: Clean, dry, & intact  [REMOVED] Peripheral IV 08/20/17 Right Hand-Site Assessment: Clean, dry, & intact     WOUND (if present)   Wound Type:  Stump/wound-vac   Dressing present Dressing Present : No   Wound Concerns/Notes:  none     PAIN    Pain Assessment    Pain Intensity 1: 2 (08/24/17 2210)    Pain Location 1: Leg    Pain Intervention(s) 1: Medication (see MAR)    Patient Stated Pain Goal: 2  o Interventions for Pain:  See mar  o Intervention effective: yes  o Time of last intervention: see mar   o Reassessment Completed: yes      Last 3 Weights:  Last 3 Recorded Weights in this Encounter    08/22/17 0321 08/23/17 0551 08/24/17 1938   Weight: 67.5 kg (148 lb 13 oz) 65.7 kg (144 lb 13.5 oz) 70.8 kg (156 lb)     Weight change:      INTAKE/OUPUT    Current Shift:      Last three shifts: 08/23 1901 - 08/25 0700  In: 960 [P.O.:960]  Out: 4275 [Urine:4275]     LAB RESULTS     Recent Labs      08/25/17   0315  08/24/17   0300  08/23/17   0550   WBC  11.9  11.6  11.6   HGB  8.2*  8.4*  8.1*   HCT  25.1*  25.6*  24.6*   PLT  707*  660*  567*        Recent Labs      08/25/17   0315  08/24/17   0300  08/23/17   0550   NA   --   133*  135*   K   --   4.3  3.5   GLU   --   92  93   BUN   --   7  6* CREA   --   0.50*  0.50*   CA   --   8.7  8.7   MG  1.9  1.8  1.7       RECOMMENDATIONS AND DISCHARGE PLANNING     1. Pending tests/procedures/ Plan of Care or Other Needs: none     2. Discharge plan for patient and Needs/Barriers: home/rehab    3. Estimated Discharge Date: unknown Posted on Whiteboard in Patients Room: no      4. The patient's care plan was reviewed with the oncoming nurse. \"HEALS\" SAFETY CHECK      Fall Risk    Total Score: 4    Safety Measures: Safety Measures: Bed/Chair-Wheels locked, Bed in low position, Call light within reach, Side rails X 3    A safety check occurred in the patient's room between off going nurse and oncoming nurse listed above. The safety check included the below items  Area Items   H  High Alert Medications - Verify all high alert medication drips (heparin, PCA, etc.)   E  Equipment - Suction is set up for ALL patients (with cailin)  - Red plugs utilized for all equipment (IV pumps, etc.)  - WOWs wiped down at end of shift.  - Room stocked with oxygen, suction, and other unit-specific supplies   A  Alarms - Bed alarm is set for fall risk patients  - Ensure chair alarm is in place and activated if patient is up in a chair   L  Lines - Check IV for any infiltration  - Jiang bag is empty if patient has a Jiang   - Tubing and IV bags are labeled   S  Safety   - Room is clean, patient is clean, and equipment is clean. - Hallways are clear from equipment besides carts. - Fall bracelet on for fall risk patients  - Ensure room is clear and free of clutter  - Suction is set up for ALL patients (with cailin)  - Hallways are clear from equipment besides carts.    - Isolation precautions followed, supplies available outside room, sign posted     Eliezer Terry RN

## 2017-08-25 NOTE — PROGRESS NOTES
Bedside shift change report given to Refugoi Gibson RN (oncoming nurse) by Boone Vieira RN (offgoing nurse). Report included the following information SBAR, Kardex, Intake/Output, MAR, Recent Results and Med Rec Status.

## 2017-08-25 NOTE — DISCHARGE SUMMARY
Physician Discharge Summary     Patient ID:  Alayna [facility]  [id number]  38 y.o.  1960    Admit date: 8/17/2017    Discharge date: 8/25/2017      Admitting Physician: Kerry Wu MD     Discharge Physician: Kerry Wu MD     Admission Diagnoses: Ischemic Rest Pain of Left Lower Extremity  dx  Atherosclerosis of native artery of both lower extremities with intermittent claudication [facility])  PAD (peripheral artery disease) (HCC)  Infected prosthetic vascular graft, sequela    Discharge Diagnoses: There are no discharge diagnoses documented for the most recent discharge. Procedures for this admission: Procedure(s):  removal of right infected graft with jump bypass  AMPUTATION KNEE(AKA)-left leg    Discharged Condition: stable    Hospital Course: Lissa Pearson a 59 y. o. male with CAD, severe peripheral arterial insufficiency admitted to [facility] on 8/17/17. He has history of ax-fem bypass with jump fem-fem bypass and right fem-pop bypass. Recently he developed abscess over his ax-fem bypass requiring I&D and repair of left CFA pseudoaneurysm. He unfortunately had blow out of his left femoral anastomosis and had left CFA ligation with jump bypass from right to left fem-fem bypass. He was admitted to [facility] on 7/22/17 for septic shock. CTA scan was negative for PE. ECHO showed severe right heart dilatation with reduced right ventricular global systolic function. EF 50%. He was placed on pressors and diuresed with good improvement. His WBCs were noted to be elevated at 27.7K and he was started on IV ABX. Reportedly blood cultures were positive however I am unable to review these results on [facility] care. He was noted to have exposed graft in the left groin with purulent drainage. He was transferred to [facility] for further treatment per his vascular surgeon.    He underwent Removal of infected graft, Repair of superficial femoral artery, left side; Repair of common femoral artery on 7/25/17.  While at rehab, he developed acute LLE ischemia, infection of right axillofemoral bypass graft and was brought back into the hospital on 8/17/17  ultimately requiring left AKA on 8/18/17. He underwent removal of infected right axillary femoral bypass graft on 8/18/17. Post op course has been uneventful. Vac therapy and PT/OT have continued without issue and he is deemed appropriate to be transferred back to rehab for continued recovery    Consults: ID and Hospitalist    Treatments: antibiotics: unasyn and vanco and therapies: PT, OT and wound care    Discharge Exam: RS: CTA bilaterally, no wheezes  CVS: RRR  GI: NT, ND, Soft  Extremities:  L AKA and dressing in situ WITH WOUND VAC. No pedal edema in RLE  General: NAD, follows commands     Disposition: acute rehab    Patient Instructions:   Current Discharge Medication List      START taking these medications    Details   warfarin (COUMADIN) 5 mg tablet Take 1 Tab by mouth nightly. Qty: 30 Tab, Refills: 0         CONTINUE these medications which have CHANGED    Details   metoprolol tartrate (LOPRESSOR) 25 mg tablet Take 1 Tab by mouth every twelve (12) hours. Indications: hypertension, VENTRICULAR RATE CONTROL IN ATRIAL FIBRILLATION  Qty: 30 Tab, Refills: 6      potassium chloride (KLOR-CON) 20 mEq packet Take 1 Packet by mouth daily. Qty: 60 Packet, Refills: 0         CONTINUE these medications which have NOT CHANGED    Details   gabapentin (NEURONTIN) 300 mg capsule Take 1 Cap by mouth every eight (8) hours. Indications: NEUROPATHIC PAIN  Qty: 45 Cap, Refills: 0    Associated Diagnoses: Hereditary peripheral neuropathy      lactobacillus sp. 50 billion cpu (BIO-K PLUS) 50 billion cell -375 mg cap capsule Take 1 Cap by mouth daily. Qty: 15 Cap, Refills: 0    Associated Diagnoses: Infection of vascular bypass graft, subsequent encounter      losartan (COZAAR) 50 mg tablet Take 1 Tab by mouth daily.  Indications: Chronic Heart Failure, hypertension  Qty: 15 Tab, Refills: 0 Associated Diagnoses: Benign hypertensive heart disease with systolic congestive heart failure, NYHA class 2 ([facility])      magnesium oxide (MAG-OX) 400 mg tablet Take 1 Tab by mouth daily. Qty: 15 Tab, Refills: 0      docusate sodium (COLACE) 100 mg capsule Take 1 Cap by mouth daily for 90 days. Qty: 30 Cap, Refills: 0      furosemide (LASIX) 20 mg tablet 1 tablet daily  Qty: 30 Tab, Refills: 0      amiodarone (CORDARONE) 200 mg tablet Take 1 Tab by mouth daily. Indications: VENTRICULAR RATE CONTROL IN ATRIAL FIBRILLATION  Qty: 60 Tab, Refills: 0      aspirin 81 mg chewable tablet Take 1 Tab by mouth daily (with breakfast). Qty: 90 Tab, Refills: 3      cholecalciferol (VITAMIN D3) 1,000 unit tablet Take 1 Tab by mouth daily. Qty: 90 Tab, Refills: 3      ascorbic acid, vitamin C, (VITAMIN C) 250 mg tablet Take 1 Tab by mouth daily (with breakfast). Qty: 90 Tab, Refills: 2      zinc sulfate (ZINCATE) 220 (50) mg capsule Take 1 Cap by mouth daily. Qty: 90 Cap, Refills: 0      ferrous sulfate 325 mg (65 mg iron) tablet Take 1 Tab by mouth daily (with breakfast). Qty: 90 Tab, Refills: 3      cyanocobalamin (VITAMIN B-12) 1,000 mcg tablet Take 1 Tab by mouth daily. Qty: 90 Tab, Refills: 3      polyethylene glycol (MIRALAX) 17 gram packet Take 1 Packet by mouth daily. Qty: 30 Packet, Refills: 0      atorvastatin (LIPITOR) 40 mg tablet Take 1 Tab by mouth nightly.  Indications: DYSLIPIDEMIA  Qty: 90 Tab, Refills: 3         STOP taking these medications       collagenase (SANTYL) 250 unit/gram ointment Comments:   Reason for Stopping:         CEFAZOLIN SODIUM/DEXTROSE,ISO (CEFAZOLIN IN DEXTROSE, ISO-OS,) 2 gram/50 mL pgbk Comments:   Reason for Stopping:         DULoxetine (CYMBALTA) 60 mg capsule Comments:   Reason for Stopping:         dilTIAZem (CARDIZEM) 30 mg tablet Comments:   Reason for Stopping:         oxyCODONE-acetaminophen (PERCOCET 10)  mg per tablet Comments:   Reason for Stopping: Reference discharge instructions as provided by nursing for diet, labs, medications, activity, wound care and any outpatient referrals.   Please ensure IV antibiotics are continued as per ID orders, as they did not transfer to the above medication list      Signed:  JUNE Mora  8/25/2017  8:47 AM

## 2017-08-25 NOTE — H&P
[address]  [address] [place] [address], [address]     INPATIENT REHABILITATION  HISTORY AND PHYSICAL    Name: Pamela Castillo CSN: [id number]   Age: 59 y.o. MRN: [id number]   Sex: male Admit Date: 8/25/2017     PCP: Dr. Quirino Halsted      Primary Rehab Impairment Category (MARTELL): Amputation, lower extremity    Impairment Group Label: Unilateral lower extremity above the knee    Etiologic Diagnosis: Ischemic rest pain of the left lower extremity due to severe peripheral vascular disease      Subjective:     Patient seen and examined. History of the Present Illness: The patient is a 20-year-old White male with multiple medical comorbidities who was admitted to [facility] on 7/24/2017 due to severe sepsis. The patient was apparently well until ~2 days prior to admission, the patient woke upand was was unable to breath so he called 911 and he was brought to the [facility] Emergency Department. CT angiogram of the chest was negative for pulmonary embolism. 2D echocardiogram showed EF 50%, severe right heart dilatation with reduced right ventricular global systolic function. He was placed on pressors and diuresed with good improvement. His WBCs were noted to be elevated at 27.7K and he was started on intravenous antibiotics. Blood cultures were reportedly positive. He was noted to have an exposed graft in the left groin with purulent drainage. He denied any pain. He had improved clinically and had been weaned off pressors. He was transferred to [facility] for further treatment by his vascular surgeon. The patient was admitted under the service of Vascular Surgery (Dr. Audrey Zaidi). WBC count was 13.9. Patient was continued on Piperacillin-Tazobactam and Vancomycin. Critical Care Medicine consult (Dr. Marisela Guadarrama) was called for evaluation and comanagement.  Cardiology consult (Dr. Karina Angeles) was called for evaluation and comanagement. The patient was brought to the operating room and underwent Removal of infected graft; Repair of left superficial femoral artery; Repair of left common femoral artery on 7/25/2017 done by Dr. Jayla Mondragon. The patient tolerated the procedure well with no intraoperative complications. Blood culture drawn on 7/24/2017 yielded growth of MSSA. Surgical wound culture done 7/25 yielded growth of MSSA. Infectious Disease consult (Dr. Arsh Saucedo) was called for evaluation and comanagement. Piperacillin-Tazobactam and Vancomycin were discontinued and the patient was started on Nafcillin IV continuous infusion on 7/28/2017. Blood culture drawn on 7/28/2017 yielded no growth after 6 days. On 8/2/2017, the Nafcillin IV continuous infusion was changed to Cefazolin 2 grams IV q 8 hr. Blood culture drawn on 8/2/2017 yielded no growth after 6 days. Infectious Disease recommended the Cefazolin to be continued until 9/12/2017. The patient had remained hemodynamically stable but due to the above events, the patient was noted to be generally weak and with impaired mobility and ADLs. Patient was felt to be a good candidate for acute inpatient rehabilitation. Upon evaluation by Physical Therapy and Occupational Therapy, the patient was recommended for acute inpatient rehabilitation. WBC count on 8/7/2017 prior to discharge was 12.7. The patient was discharged and was subsequently admitted to the [facility] on 8/7/2017 for intensive rehabilitation to help recover strength, function and mobility. The patient was able to actively participate in the therapy activities but he has reported numbness from the middle of the left leg downwards.  On 8/10/2017, Vascular Surgery ([address] [place] [address]) was informed of the numbness and erythema of the shin of the left leg and she was aware of it; no diagnostic studies for now; Vascular Surgery will be evaluating the patient in the AM to see if he needs further surgical intervention. On 8/12/2017, patient complained of worsening pain of LLE at rest. WBC count (8/13/2017) = 12.4. On 8/14/2017, patient was seen and evaluated by JUNE Bailey and discussed with Dr. Anu Mosley and it was felt that the patient has ischemic rest pain of the left lower extremity and the patient will be scheduled for removal of infected right bypass graft with jump bypass and left AKA on 8/18/2017. The patient was discharged / transferred to [facility] as an inpatient under the service of Vascular Surgery (Dr. Anu Mosley) on 8/17/2017. The patient underwent a Left above-the-knee amputation; Right axillary femoral jump bypass interposition; Removal of infected right axillary femoral bypass; Wound VAC placement of upper thigh 1.2 cm x 5.2 cm x 1.8 cm and groin 4 cm x 0.5 cm x 0.2 cm on 8/18/2017 done by Dr. Anu Mosley. The patient tolerated the procedure well with no reported intraoperative complications. The patient developed acute postoperative blood loss anemia and he was transfused with pRBC. Intravenous Cefazolin was continued. On 8/19/2017, the patient was noted to have an altered mental status. The Rapid response Team was called. The patient was given Lorazepam and Phenobarbital. Three-point restraints had been utilized. The [facility] Hospitalist group (Dr. Sharonda Mike) was called for medical comanagement. Cefazolin was changed to Piperacillin-Tazobactam IV and Vancomycin IV on 8/19/2017. Impression was Acute metabolic encephalopathy. Patient was noted to have a prolonged QTc interval. Diltiazem and Amiodarone were put on hold. Metoprolol tartrate was continued. Cardiology consult (Dr. Katya Vo) was called for evaluation and comanagement. Amiodarone was resumed. The confusion/alteration in mental status was noted to slowly improve.  Infectious Disease consult (Dr. Td Bustillos Justin Christian) was called for evaluation and comanagement. On 8/22/2017, Piperacillin-Tazobactam IV and Vancomycin IV were discontinued and the patient was placed on Ampicillin-Sulbactam IV to be given until 9/19/2017. A PICC line was inserted by Interventional Radiology (Dr. Marcos Mcghee) on 8/24/2017. The patient had remained hemodynamically stable but due to the above events, the patient was noted to be generally weak and with impaired mobility and ADLs. Patient was felt to be a good candidate for acute inpatient rehabilitation. Upon evaluation by Physical Therapy and Occupational Therapy, the patient was recommended for acute inpatient rehabilitation. The patient was discharged and was subsequently admitted to the [facility] on 8/7/2017 for intensive rehabilitation to help recover strength, function and mobility. Past Medical History:  Past Medical History:   Diagnosis Date    Acute blood loss as cause of postoperative anemia 4/4/2017    Anticoagulated on Coumadin     Aortoiliac occlusive disease (HCC) 1/25/2017    Atherosclerosis of native artery of both lower extremities with intermittent claudication ([address] 1/25/2017    Benign hypertensive heart disease with systolic congestive heart failure, NYHA class 2 ([address] 1/26/2015    Carotid artery disease (HCC)     Chronic anemia     Chronic systolic heart failure (HCC)     Chronic ulcer of right foot ([address] 4/4/2017    Chronic ulcer of right heel ([address] 8/8/2017    Coronary artery disease involving native coronary artery of native heart 3/15/2017    Successful stenting of Cx (Xience LESLEY) and RCA (Xience LESLEY) to 0% by Dr. Champ Herbert on 3/15/17.     DDD (degenerative disc disease), lumbar 1/26/2015    Dyslipidemia     Erectile dysfunction 7/5/2016    Euthyroid sick syndrome 4/6/2017    Hereditary peripheral neuropathy 11/15/2016    History of cardioversion 4/18/2017    S/P Synchronized external cardioversion (4/18/2017 - Dr. Erazo Mireya)    History of vitamin D deficiency 4/22/2017    Vitamin D 25-Hydroxy (4/22/2017) = 12.0; Vitamin D 25-Hydroxy (5/26/2017) = 38.7    Infection of vascular bypass graft ([facility] 7/24/2017    Left lower extremity    Ischemic cardiomyopathy     Moderate to severe pulmonary hypertension ([facility] 7/23/2017    Paroxysmal atrial fibrillation (HCC)     Peripheral artery disease ([facility] 1/25/2017    Spinal stenosis of lumbar region with radiculopathy 5/4/2015    Dr. Paul Cárdenas       Past Surgical History:  Past Surgical History:   Procedure Laterality Date    CARDIAC CATHETERIZATION  3/8/2017         CARDIAC CATHETERIZATION  3/15/2017         CORONARY STENT SINGLE W/PTCA  3/15/2017         HX ABOVE KNEE AMPUTATION Left 08/18/2017    S/P Left above-the-knee amputation (8/18/2017 - Dr. Malu Chahal)    HX ATHERECTOMY Left 06/15/2017    S/P Atherectomy and balloon angioplasty of the left superficial femoral artery and above-knee popliteal artery (6/15/2017 - Dr. Malu Chahal)    HX COLONOSCOPY  07/23/2015    polyps repeat 2020 5 years [address] Right 04/04/2017    S/P Right axillary to bifemoral artery bypass using an 8 mm Propaten graft from the right axilla to the right common femoral artery with a jump femoral-femoral bypass with another 8 mm Propaten external ring bypass; Right common femoral artery, and profunda femoris artery and superficial femoral artery endarterectomy causing an extensive surgery on the right side (4/4/2017 - Dr. Isabelle Lucero)    [address] Right 04/07/2017    S/P Cutdown of femoral-femoral bypass, more on the left groin side; Right lower extremity angiography with first-order catheterization (4/7/2017 - Dr. Malu Chahal)    HX FEMORAL BYPASS Right 04/10/2017    S/P Right femoral to above-knee popliteal bypass with an 8 mm PTFE graft (4/10/2017 - Dr. Ivanna Chapin)    HX OTHER SURGICAL Left 07/25/2017    S/P Removal of infected graft; Repair of left superficial femoral artery; Repair of left common femoral artery (2017 - Dr. Marcia Portillo)    HX OTHER SURGICAL Right 2017    S/P Drainage of right side abscess (2017 - Dr. Marcia Portillo)    HX OTHER SURGICAL Left 2017    S/P Left groin exploration; Left femoral repair pseudoaneurysm; Left wound washout; Wound VAC placement (2017 - Dr. Marcia Portillo)    HX OTHER SURGICAL Left 2017    S/P Left common femoral artery ligation; Jump bypass from right to left fem-fem to a more distal superficial femoral artery using 8 mm external ringed Propaten graft; Left groin wound exploration and washout (2017 - Dr. Marcia Portillo)    HX OTHER SURGICAL Right 2017    S/P Right axillary femoral jump bypass interposition; Removal of infected right axillary femoral bypass; Wound VAC placement of upper thigh 1.2 cm x 5.2 cm x 1.8 cm and groin 4 cm x 0.5 cm x 0.2 cm (2017 - Dr. Marcia Portillo)       Allergies: Allergies   Allergen Reactions    Morphine Other (comments)     Patient gets confused with morphine. Social History: The patient is legally , lives alone in a 1-story trailer with a 3-step entry in [place], [place]. Patient is an ex-cigarette smoker who quit >10 years ago. He is an ex-alcoholic beverage drinker who quit ~3/2017. He last smoked marijuana ~10/2016. He presently works as a . He has a daughter who lives in [place], [place]. Family History: Both parents are . Family History   Problem Relation Age of Onset    Heart Disease Father        Transfer Medications (from the transfer summary prepared by JUNE Ayoub):    Prior to Admission Medications   Prescriptions Last Dose Informant Patient Reported? Taking?   amiodarone (CORDARONE) 200 mg tablet 2017 at Unknown time  No Yes   Sig: Take 1 Tab by mouth daily.  Indications: VENTRICULAR RATE CONTROL IN ATRIAL FIBRILLATION   ampicillin-sulbactam 3 gram 3 g IVPB 2017 at Unknown time  No Yes   Sig: 3 g by IntraVENous route every six (6) hours. ascorbic acid, vitamin C, (VITAMIN C) 250 mg tablet 2017 at Unknown time  No Yes   Sig: Take 1 Tab by mouth daily (with breakfast). aspirin 81 mg chewable tablet 2017 at Unknown time  No Yes   Sig: Take 1 Tab by mouth daily (with breakfast). atorvastatin (LIPITOR) 40 mg tablet 2017 at Unknown time  No Yes   Sig: Take 1 Tab by mouth nightly. Indications: DYSLIPIDEMIA   cholecalciferol (VITAMIN D3) 1,000 unit tablet 2017 at Unknown time  No Yes   Sig: Take 1 Tab by mouth daily. cyanocobalamin (VITAMIN B-12) 1,000 mcg tablet 2017 at Unknown time  No Yes   Sig: Take 1 Tab by mouth daily. docusate sodium (COLACE) 100 mg capsule 2017 at Unknown time  No Yes   Sig: Take 1 Cap by mouth daily for 90 days. ferrous sulfate 325 mg (65 mg iron) tablet 2017 at Unknown time  No Yes   Sig: Take 1 Tab by mouth daily (with breakfast). furosemide (LASIX) 20 mg tablet 2017 at Unknown time  No Yes   Si tablet daily   gabapentin (NEURONTIN) 300 mg capsule 2017 at Unknown time  No Yes   Sig: Take 1 Cap by mouth every eight (8) hours. Indications: NEUROPATHIC PAIN   lactobacillus sp. 50 billion cpu (BIO-K PLUS) 50 billion cell -375 mg cap capsule 2017 at Unknown time  No Yes   Sig: Take 1 Cap by mouth daily. losartan (COZAAR) 50 mg tablet 2017 at Unknown time  No Yes   Sig: Take 1 Tab by mouth daily. Indications: Chronic Heart Failure, hypertension   magnesium oxide (MAG-OX) 400 mg tablet 2017 at Unknown time  No Yes   Sig: Take 1 Tab by mouth daily. metoprolol tartrate (LOPRESSOR) 25 mg tablet 2017 at Unknown time  No Yes   Sig: Take 1 Tab by mouth every twelve (12) hours.  Indications: hypertension, VENTRICULAR RATE CONTROL IN ATRIAL FIBRILLATION   polyethylene glycol (MIRALAX) 17 gram packet 2017 at Unknown time  No Yes   Sig: Take 1 Packet by mouth daily. potassium chloride (KLOR-CON) 20 mEq packet 8/25/2017 at Unknown time  No Yes   Sig: Take 1 Packet by mouth daily. vancomycin (VANCOCIN) 1,000 mg injection 8/25/2017 at Unknown time  No Yes   Sig: As per ID and based on vanc levels   warfarin (COUMADIN) 5 mg tablet 8/25/2017 at Unknown time  No Yes   Sig: Take 1 Tab by mouth nightly. zinc sulfate (ZINCATE) 220 (50) mg capsule 8/25/2017 at Unknown time  No Yes   Sig: Take 1 Cap by mouth daily. Facility-Administered Medications: None        Review Of Systems:   CONSTITUTIONAL: No weight loss. EYES: No blurred vision and no eye discharge. ENT: No nasal discharge. No ear pain. CARDIOVASCULAR: No chest pain and no diaphoresis. RESPIRATORY: No cough, no hemoptysis. GI: No vomiting, no diarrhea   : No urinary frequency and no dysuria. MUSCULOSKELETAL: Significant for acute postoperative musculoskeletal pain. SKIN: No rashes. NEURO: No dizziness, no numbness. ENDOCRINE: No polyphagia and no polydipsia. HEMATOLOGY: Significant for acute postoperative anemia on top of chronic anemia. Objective:     Vital Signs:  Patient Vitals for the past 24 hrs:   BP Temp Pulse Resp SpO2 Height Weight   08/25/17 1535 133/65 96.7 °F (35.9 °C) 60 18 98 % - -   08/25/17 1330 126/67 97.2 °F (36.2 °C) 61 18 97 % 5' 10\" (1.778 m) 70.8 kg (156 lb 1.4 oz)        Body mass index is 22.4 kg/(m^2). Physical Examination:  GENERAL SURVEY: Patient is awake, alert, oriented x 3, sitting comfortably on the chair, not in acute respiratory distress.   HEENT: pale palpebral conjunctivae, anicteric sclerae, no nasoaural discharge, moist oral mucosa  NECK: supple, no jugular venous distention, no palpable lymph nodes  CHEST/LUNGS: symmetrical chest expansion, good air entry, clear breath sounds  HEART: adynamic precordium, good S1 S2, no S3, regular rhythm, no murmurs  ABDOMEN: flat, bowel sounds appreciated, soft, non-tender  EXTREMITIES: (+) left AKA stump with dressing, (+) WoundVac on left medial thigh, (+) ~1 cm x ~1 cm ulcer on right heel, (+) 1.5 cm x 1.5 cm wound above lateral malleolus of right ankle, pale nailbeds, no edema, full and equal pulses, no calf tenderness   NEUROLOGICAL EXAM: The patient is awake, alert and oriented x3, able to answer questions fairly appropriately, able to follow 1 and 2 step commands. Able to tell time from the wall clock. Cranial nerves II-XII are grossly intact. No gross sensory deficit. Motor strength is 4+/5 on BUE, 4+/5 on the RLE, 3+/5 on the left hip, 4-/5 on the left knee and left ankle.     Incision(s): healing well, clean, dry, and intact       Current Medications:  Current Facility-Administered Medications   Medication Dose Route Frequency    acetaminophen (TYLENOL) tablet 650 mg  650 mg Oral Q4H PRN    docusate sodium (COLACE) capsule 100 mg  100 mg Oral BID    bisacodyl (DULCOLAX) tablet 10 mg  10 mg Oral Q48H PRN    warfarin (COUMADIN) tablet 7.5 mg  7.5 mg Oral ONCE    heparin (porcine) injection 5,000 Units  5,000 Units SubCUTAneous Q8H    [START ON 8/26/2017] lactobacillus sp. 50 billion cpu (BIO-K PLUS) capsule 1 Cap  1 Cap Oral DAILY    [START ON 8/26/2017] magnesium oxide (MAG-OX) tablet 400 mg  400 mg Oral DAILY    gabapentin (NEURONTIN) capsule 300 mg  300 mg Oral Q8H    [START ON 8/26/2017] losartan (COZAAR) tablet 50 mg  50 mg Oral DAILY    [START ON 8/26/2017] furosemide (LASIX) tablet 20 mg  20 mg Oral DAILY    HYDROmorphone (DILAUDID) tablet 2-4 mg  2-4 mg Oral Q4H PRN    [START ON 8/26/2017] potassium chloride (KLOR-CON) packet 20 mEq  20 mEq Oral DAILY    polyethylene glycol (MIRALAX) packet 17 g  17 g Oral DAILY    [START ON 8/26/2017] amiodarone (CORDARONE) tablet 200 mg  200 mg Oral DAILY    [START ON 8/26/2017] aspirin chewable tablet 81 mg  81 mg Oral DAILY WITH BREAKFAST    [START ON 8/26/2017] cholecalciferol (VITAMIN D3) tablet 2,000 Units  2,000 Units Oral DAILY    [START ON 8/26/2017] ferrous sulfate tablet 325 mg  1 Tab Oral DAILY WITH BREAKFAST    [START ON 8/26/2017] ascorbic acid (vitamin C) (VITAMIN C) tablet 250 mg  250 mg Oral DAILY WITH BREAKFAST    [START ON 8/26/2017] zinc sulfate (ZINCATE) capsule 220 mg  1 Cap Oral DAILY    atorvastatin (LIPITOR) tablet 40 mg  40 mg Oral QHS    metoprolol tartrate (LOPRESSOR) tablet 25 mg  25 mg Oral Q12H    [START ON 8/26/2017] cyanocobalamin tablet 1,000 mcg  1,000 mcg Oral DAILY    WARFARIN INFORMATION NOTE (COUMADIN)   Other Rx Dosing/Monitoring    ampicillin-sulbactam (UNASYN) 3 g in 0.9% sodium chloride (MBP/ADV) 100 mL MBP  3 g IntraVENous Q6H       Functional Assessment:     Occupational Therapy   Prior Level of Function  Pre-Admission Screen    Eating   Independent Eating   Independent   Grooming   Independent Grooming   Moderate Assist   Upper Body Dressing   Independent Upper Body Dressing   Minimal Assist   Lower Body Dressing   Independent Lower Body Dressing   Maximal Assist   Bladder Management   Independent Bladder Management   Maximal Assist   Bowel Management   Independent Bowel Management   Maximal Assist     Physical Therapy   Prior Level of Function  Pre-Admission Screen    Ambulation   Modified Independent Ambulation   Minimal Assist   Bed Mobility   Independent Bed Mobility   Minimal Assist   Supine to Sit   Independent Supine to Sit   Minimal Assist   Sit to Stand   Independent Sit to Stand   Minimal Assist   Bed/Chair Transfers   Independent Bed/Chair Transfers   Minimal Assist   Toilet Transfers   Independent Toilet Transfers   Minimal Assist       Assessment:     Primary Rehabilitation Diagnosis  1. Impaired Mobility and ADLs  2. S/P Left above-the-knee amputation (8/18/2017 - Dr. Polly Irwin)  3.  History of ischemic rest pain of the left lower extremity due to severe peripheral vascular disease     Benign hypertensive heart disease with systolic congestive heart failure, NYHA class 2  I11.0, I50.20    DDD (degenerative disc disease), lumbar M51.36    Erectile dysfunction N52.9    Spinal stenosis of lumbar region with radiculopathy M48.06, M54.16    Hereditary peripheral neuropathy G60.9    Aortoiliac occlusive disease  I74.09    Atherosclerosis of native artery of both lower extremities with intermittent claudication  I70.213    Peripheral artery disease  I73.9    Coronary artery disease involving native coronary artery of native heart I25.10    Paroxysmal atrial fibrillation  I48.0    Acute blood loss as cause of postoperative anemia D62    Anticoagulated on Coumadin Z51.81, Z79.01    Ischemic cardiomyopathy I25.5    Chronic systolic heart failure  T17.47    Carotid artery disease  I77.9    Dyslipidemia E78.5    Euthyroid sick syndrome E07.81    Aftercare following surgery of the circulatory system Z48.812    Status post femoral-popliteal bypass surgery Z95.828    History of cardioversion Z98.890    Critical ischemia of right lower extremity I99.8    History of vitamin D deficiency Z86.39    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    Infection of vascular bypass graft  T82. 7XXA    Chronic anemia D64.9    Chronic ulcer of right heel (HCC) L97.419    Prolonged Q-T interval on ECG R94.31    Aftercare for amputation stump Z47.81    Moderate to severe pulmonary hypertension  I27.2    Adverse effect of amiodarone T46.2X5A    Skin ulcer of malleolar area of right ankle  L97.319        Willingness to participate in the program: Good      Rehabilitation Potential: Good      Plan:     1. Medical Issues being followed closely:    [x]  Fall and safety precautions     [x]  Wound Care     [x]  Bowel and Bladder Function     [x]  Fluid Electrolyte and Nutrition Balance     [x]  Pain Control      2.  Issues that 24 hour rehabilitation nursing is following:    [x]  Fall and safety precautions     [x]  Wound Care     [x]  Bowel and Bladder Function     [x]  Fluid Electrolyte and Nutrition Balance     [x]  Pain Control      [x]  Assistance with and education on in-room safety with transfers to and from the bed, wheelchair, toilet and shower. 3. Acute rehabilitation plan of care:    [x]  Patient to be evaluated and treated by:           [x]  Physical Therapy           [x]  Occupational Therapy           []  Speech Therapy     []  Hold Rehab until further notice     5. Medications:    [x]  MAR Reviewed     [x]  Continue Present Medications     6. DVT Prophylaxis:      []  Lovenox     [x]  Unfractionated Heparin     [x]  Coumadin     []  NOAC     []  VASQUEZ Stockings     []  Sequential Compression Device     []  None     7. Rehabilitation program and expectations from patient, as well as medical issues discussed with the patient. MEDICAL PLAN:  > S/P Left above-the-knee amputation (8/18/2017 - Dr. Esmer Acosta); History of ischemic rest pain of the left lower extremity due to severe peripheral artery disease   > Wound vac dressing changes by staff nurses every Monday, Wednesday, & Friday on day shift. Measure wound size & document with each dressing change. Settings: 125mmHG low continuous suction. Measure wound size & document with each dressing change. Change canister when 3/4 full. May use saline dressings daily until wound vac initiated.   > Mepilex Border dressings to left AKA staple line q2days & prn soilage. Wrap with ace wrap in figure 8 form.   > Staples to be removed on 9/15/2017    > S/P Removal of infected graft; Repair of left superficial femoral artery; Repair of left common femoral artery (7/25/2017 - Dr. Esmer Acosta);  History of sepsis associated with infection of vascular bypass graft   > Ampicillin Sulbactam 3 grams IVPB q 6 hr (STOP DATE: 9/19/2017)    > Acute Postoperative Blood Loss Anemia   > Hgb/Hct (8/24/2017, prior to admission to the ARU) = 8.4/25.6   > Anemia work-up (8/22/2017) showed serum iron 15, TIBC 146, iron % saturation 10   > FeSO4 325 mg PO once daily with breakfast    > Ascorbic Acid 250 mg PO once daily with breakfast (to enhance the absorption of the FeSO4)     > Benign hypertensive heart disease with chronic systolic heart failure   > Furosemide 20 mg PO once daily (6AM)   > Increase Losartan from 25 mg to 50 mg PO once daily (6AM)   > Metoprolol tartrate 25 mg PO q 12 hr (9AM, 9PM)    > Paroxysmal atrial fibrillation, presently normal sinus rhythm, anticoagulated on Coumadin   > Amiodarone 200 mg PO once daily   > Metoprolol tartrate 25 mg PO q 12 hr (9AM, 9PM)   > Heparin 5,000 units SC q 8 hr until INR is greater than 2.0 for 2 determinations   > Target INR = 2 to 3   > Coumadin 7.5 mg PO tonight    > Chronic ulcer of right heel    > Apply rigid heel suspension boots on both feet when supine in bed    > Skin ulcer of malleolar area of right ankle   > Cleanse wound with saline, apply Aquacel ag dressing, Mepilex Border to right lateral ankle wound every 48hrs & prn soilage.     > Analgesia   > Acetaminophen 650 mg PO q 4 hr PRN for pain level less than 5/10   > Hydromorphone 2-4 mg PO q 4 hr PRN for pain level greater than 4/10      Signed:    Rachna Lozano MD    August 25, 2017

## 2017-08-25 NOTE — PROGRESS NOTES
Pt sleeping.  Did not awaken  Hopeful transfer to ARU today  Will await confirmation from case management and follow up with orders/summary  Will also resume coumadin

## 2017-08-25 NOTE — PROGRESS NOTES
Care Management Interventions  Mode of Transport at Discharge:  (family)  Transition of Care Consult (CM Consult): Other  [address] No  Reason Outside [place]:  (pt accepted to ARU)  Physical Therapy Consult: Yes  Occupational Therapy Consult: Yes  Current Support Network: Relative's Home  Confirm Follow Up Transport: Family  Plan discussed with Pt/Family/Caregiver: Yes  Discharge Location  Discharge Placement: Rehab hospital/unit acute      Pt has been accepted to ARU  for 1030am. Nursing aware.

## 2017-08-25 NOTE — PROGRESS NOTES
SUBJECTIVE:    Feeling better. States he is going to ARU today. No chest or abdominal pain. No SOB or cough. Leg pain is better. OBJECTIVE:    Visit Vitals    /67 (BP 1 Location: Left arm, BP Patient Position: At rest)    Pulse 67    Temp 97.9 °F (36.6 °C)    Resp 18    Wt 70.8 kg (156 lb)    SpO2 100%    BMI 22.38 kg/m2     RS: CTA bilaterally, no wheezes  CVS: RRR  GI: NT, ND, Soft  Extremities:  L AKA and dressing in situ WITH WOUND VAC. No pedal edema in RLE  General: NAD, follows commands     ASSESSMENT:    1. Acute metabolic encephalopathy due to pain, clinically improved   2. 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 Leukocytosis and LG fever, better  4. Hyponatremia, stable  5. Anemia including iron deficiency anemia, stable  6. Prolonged QTc initially, improved   7. 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 Compensated. 8. Afib. Rate controlled.     PLAN:    Continue current management  cont iv ampicillin/sulbactam till 9/19/17 per ID through PICC  Can be discharged to ARU once he gets approval  Will sign off      CMP:   Lab Results   Component Value Date/Time    MG 1.9 08/25/2017 03:15 AM     CBC:   Lab Results   Component Value Date/Time    WBC 11.9 08/25/2017 03:15 AM    HGB 8.2 (L) 08/25/2017 03:15 AM    HCT 25.1 (L) 08/25/2017 03:15 AM     (H) 08/25/2017 03:15 AM

## 2017-08-25 NOTE — H&P
[facility] PHYSICAL [address], [address]     INPATIENT REHABILITATION  HISTORY AND PHYSICAL  (Post Admission Physician Evaluation)    Name: Elizabeth Durham CSN: [id number]   Age: 59 y.o. MRN: [id number]   Sex: male Admit Date: 8/25/2017     PCP: Dr. Karely Noe      Primary Rehab Impairment Category (MARTELL): Amputation, lower extremity    Impairment Group Label: Unilateral lower extremity above the knee    Etiologic Diagnosis: Ischemic rest pain of the left lower extremity due to severe peripheral vascular disease      Subjective:     Patient seen and examined. History of the Present Illness: The patient is a 40-year-old White male with multiple medical comorbidities who was admitted to [facility] on 7/24/2017 due to severe sepsis. The patient was apparently well until ~2 days prior to admission, the patient woke upand was was unable to breath so he called 911 and he was brought to the [facility] Emergency Department. CT angiogram of the chest was negative for pulmonary embolism. 2D echocardiogram showed EF 50%, severe right heart dilatation with reduced right ventricular global systolic function. He was placed on pressors and diuresed with good improvement. His WBCs were noted to be elevated at 27.7K and he was started on intravenous antibiotics. Blood cultures were reportedly positive. He was noted to have an exposed graft in the left groin with purulent drainage. He denied any pain. He had improved clinically and had been weaned off pressors. He was transferred to [facility] for further treatment by his vascular surgeon. The patient was admitted under the service of Vascular Surgery (Dr. Ar Bello). WBC count was 13.9. Patient was continued on Piperacillin-Tazobactam and Vancomycin. Critical Care Medicine consult (Dr. Rex Abbott) was called for evaluation and comanagement.  Cardiology consult (Dr. Celia De Anda) was called for evaluation and comanagement. The patient was brought to the operating room and underwent Removal of infected graft; Repair of left superficial femoral artery; Repair of left common femoral artery on 7/25/2017 done by Dr. Janel Mckeon. The patient tolerated the procedure well with no intraoperative complications. Blood culture drawn on 7/24/2017 yielded growth of MSSA. Surgical wound culture done 7/25 yielded growth of MSSA. Infectious Disease consult (Dr. Lyssa Esparza) was called for evaluation and comanagement. Piperacillin-Tazobactam and Vancomycin were discontinued and the patient was started on Nafcillin IV continuous infusion on 7/28/2017. Blood culture drawn on 7/28/2017 yielded no growth after 6 days. On 8/2/2017, the Nafcillin IV continuous infusion was changed to Cefazolin 2 grams IV q 8 hr. Blood culture drawn on 8/2/2017 yielded no growth after 6 days. Infectious Disease recommended the Cefazolin to be continued until 9/12/2017. The patient had remained hemodynamically stable but due to the above events, the patient was noted to be generally weak and with impaired mobility and ADLs. Patient was felt to be a good candidate for acute inpatient rehabilitation. Upon evaluation by Physical Therapy and Occupational Therapy, the patient was recommended for acute inpatient rehabilitation. WBC count on 8/7/2017 prior to discharge was 12.7. The patient was discharged and was subsequently admitted to the [facility] on 8/7/2017 for intensive rehabilitation to help recover strength, function and mobility. The patient was able to actively participate in the therapy activities but he has reported numbness from the middle of the left leg downwards.  On 8/10/2017, Vascular Surgery ([facility], [address]) was informed of the numbness and erythema of the shin of the left leg and she was aware of it; no diagnostic studies for now; Vascular Surgery will be evaluating the patient in the AM to see if he needs further surgical intervention. On 8/12/2017, patient complained of worsening pain of LLE at rest. WBC count (8/13/2017) = 12.4. On 8/14/2017, patient was seen and evaluated by JUNE Figueredo and discussed with Dr. Hilda Carroll and it was felt that the patient has ischemic rest pain of the left lower extremity and the patient will be scheduled for removal of infected right bypass graft with jump bypass and left AKA on 8/18/2017. The patient was discharged / transferred to [facility] as an inpatient under the service of Vascular Surgery (Dr. Hilda Carroll) on 8/17/2017. The patient underwent a Left above-the-knee amputation; Right axillary femoral jump bypass interposition; Removal of infected right axillary femoral bypass; Wound VAC placement of upper thigh 1.2 cm x 5.2 cm x 1.8 cm and groin 4 cm x 0.5 cm x 0.2 cm on 8/18/2017 done by Dr. Hilda Carroll. The patient tolerated the procedure well with no reported intraoperative complications. The patient developed acute postoperative blood loss anemia and he was transfused with pRBC. Intravenous Cefazolin was continued. On 8/19/2017, the patient was noted to have an altered mental status. The Rapid response Team was called. The patient was given Lorazepam and Phenobarbital. Three-point restraints had been utilized. The [facility] Hospitalist group (Dr. Glenna Hall) was called for medical comanagement. Cefazolin was changed to Piperacillin-Tazobactam IV and Vancomycin IV on 8/19/2017. Impression was Acute metabolic encephalopathy. Patient was noted to have a prolonged QTc interval. Diltiazem and Amiodarone were put on hold. Metoprolol tartrate was continued. Cardiology consult (Dr. Rosa Gonzales) was called for evaluation and comanagement. Amiodarone was resumed. The confusion/alteration in mental status was noted to slowly improve. Infectious Disease consult (Dr. Anson Mirza) was called for evaluation and comanagement. On 8/22/2017, Piperacillin-Tazobactam IV and Vancomycin IV were discontinued and the patient was placed on Ampicillin-Sulbactam IV to be given until 9/19/2017. A PICC line was inserted by Interventional Radiology (Dr. Lupe Broussard) on 8/24/2017. The patient had remained hemodynamically stable but due to the above events, the patient was noted to be generally weak and with impaired mobility and ADLs. Patient was felt to be a good candidate for acute inpatient rehabilitation. Upon evaluation by Physical Therapy and Occupational Therapy, the patient was recommended for acute inpatient rehabilitation. The patient was discharged and was subsequently admitted to the [facility] on 8/7/2017 for intensive rehabilitation to help recover strength, function and mobility. Past Medical History:  Past Medical History:   Diagnosis Date    Acute blood loss as cause of postoperative anemia 4/4/2017    Anticoagulated on Coumadin     Aortoiliac occlusive disease (HCC) 1/25/2017    Atherosclerosis of native artery of both lower extremities with intermittent claudication ([address] 1/25/2017    Benign hypertensive heart disease with systolic congestive heart failure, NYHA class 2 ([address] 1/26/2015    Carotid artery disease (HCC)     Chronic anemia     Chronic systolic heart failure (HCC)     Chronic ulcer of right foot ([address] 4/4/2017    Chronic ulcer of right heel ([address] 8/8/2017    Coronary artery disease involving native coronary artery of native heart 3/15/2017    Successful stenting of Cx (Xience LESLEY) and RCA (Xience LESLEY) to 0% by Dr. Jennings Officer on 3/15/17.     DDD (degenerative disc disease), lumbar 1/26/2015    Dyslipidemia     Erectile dysfunction 7/5/2016    Euthyroid sick syndrome 4/6/2017    Hereditary peripheral neuropathy 11/15/2016    History of cardioversion 4/18/2017    S/P Synchronized external cardioversion (4/18/2017 - Dr. Breanne Blackman)    History of vitamin D deficiency 4/22/2017    Vitamin D 25-Hydroxy (4/22/2017) = 12.0; Vitamin D 25-Hydroxy (5/26/2017) = 38.7    Infection of vascular bypass graft ([facility] 7/24/2017    Left lower extremity    Ischemic cardiomyopathy     Moderate to severe pulmonary hypertension ([facility] 7/23/2017    Paroxysmal atrial fibrillation (HCC)     Peripheral artery disease ([facility] 1/25/2017    Skin ulcer of malleolar area of right ankle ([facility]     Spinal stenosis of lumbar region with radiculopathy 5/4/2015    Dr. Marian Miller       Past Surgical History:  Past Surgical History:   Procedure Laterality Date    CARDIAC CATHETERIZATION  3/8/2017         CARDIAC CATHETERIZATION  3/15/2017         CORONARY STENT SINGLE W/PTCA  3/15/2017         HX ABOVE KNEE AMPUTATION Left 08/18/2017    S/P Left above-the-knee amputation (8/18/2017 - Dr. Pop Urbina)    HX ATHERECTOMY Left 06/15/2017    S/P Atherectomy and balloon angioplasty of the left superficial femoral artery and above-knee popliteal artery (6/15/2017 - Dr. Pop Urbina)    HX COLONOSCOPY  07/23/2015    polyps repeat 2020 5 years , Climmie Leader    [address] Right 04/04/2017    S/P Right axillary to bifemoral artery bypass using an 8 mm Propaten graft from the right axilla to the right common femoral artery with a jump femoral-femoral bypass with another 8 mm Propaten external ring bypass; Right common femoral artery, and profunda femoris artery and superficial femoral artery endarterectomy causing an extensive surgery on the right side (4/4/2017 - Dr. Pablo Zelaya)    HX FEMORAL BYPASS Right 04/07/2017    S/P Cutdown of femoral-femoral bypass, more on the left groin side; Right lower extremity angiography with first-order catheterization (4/7/2017 - Dr. Pop Urbina)    HX FEMORAL BYPASS Right 04/10/2017    S/P Right femoral to above-knee popliteal bypass with an 8 mm PTFE graft (4/10/2017 - Dr. Marvin Marie)    HX OTHER SURGICAL Left 2017    S/P Removal of infected graft; Repair of left superficial femoral artery; Repair of left common femoral artery (2017 - Dr. Helder Martin)    HX OTHER SURGICAL Right 2017    S/P Drainage of right side abscess (2017 - Dr. Helder Martin)    HX OTHER SURGICAL Left 2017    S/P Left groin exploration; Left femoral repair pseudoaneurysm; Left wound washout; Wound VAC placement (2017 - Dr. Helder Martin)    HX OTHER SURGICAL Left 2017    S/P Left common femoral artery ligation; Jump bypass from right to left fem-fem to a more distal superficial femoral artery using 8 mm external ringed Propaten graft; Left groin wound exploration and washout (2017 - Dr. Helder Martin)    HX OTHER SURGICAL Right 2017    S/P Right axillary femoral jump bypass interposition; Removal of infected right axillary femoral bypass; Wound VAC placement of upper thigh 1.2 cm x 5.2 cm x 1.8 cm and groin 4 cm x 0.5 cm x 0.2 cm (2017 - Dr. Helder Martin)       Allergies: Allergies   Allergen Reactions    Morphine Other (comments)     Patient gets confused with morphine. Social History: The patient is legally , lives alone in a 1-story trailer with a 3-step entry in [place], [place]. Patient is an ex-cigarette smoker who quit >10 years ago. He is an ex-alcoholic beverage drinker who quit ~3/2017. He last smoked marijuana ~10/2016. He presently works as a . He has a daughter who lives in [place], [place]. Family History: Both parents are . Family History   Problem Relation Age of Onset    Heart Disease Father        Transfer Medications (from the transfer summary prepared by JUNE Villanueva):    Prior to Admission Medications   Prescriptions Last Dose Informant Patient Reported?  Taking?   amiodarone (CORDARONE) 200 mg tablet 2017 at Unknown time  No Yes   Sig: Take 1 Tab by mouth daily. Indications: VENTRICULAR RATE CONTROL IN ATRIAL FIBRILLATION   ampicillin-sulbactam 3 gram 3 g IVPB 2017 at Unknown time  No Yes   Sig: 3 g by IntraVENous route every six (6) hours. ascorbic acid, vitamin C, (VITAMIN C) 250 mg tablet 2017 at Unknown time  No Yes   Sig: Take 1 Tab by mouth daily (with breakfast). aspirin 81 mg chewable tablet 2017 at Unknown time  No Yes   Sig: Take 1 Tab by mouth daily (with breakfast). atorvastatin (LIPITOR) 40 mg tablet 2017 at Unknown time  No Yes   Sig: Take 1 Tab by mouth nightly. Indications: DYSLIPIDEMIA   cholecalciferol (VITAMIN D3) 1,000 unit tablet 2017 at Unknown time  No Yes   Sig: Take 1 Tab by mouth daily. cyanocobalamin (VITAMIN B-12) 1,000 mcg tablet 2017 at Unknown time  No Yes   Sig: Take 1 Tab by mouth daily. docusate sodium (COLACE) 100 mg capsule 2017 at Unknown time  No Yes   Sig: Take 1 Cap by mouth daily for 90 days. ferrous sulfate 325 mg (65 mg iron) tablet 2017 at Unknown time  No Yes   Sig: Take 1 Tab by mouth daily (with breakfast). furosemide (LASIX) 20 mg tablet 2017 at Unknown time  No Yes   Si tablet daily   gabapentin (NEURONTIN) 300 mg capsule 2017 at Unknown time  No Yes   Sig: Take 1 Cap by mouth every eight (8) hours. Indications: NEUROPATHIC PAIN   lactobacillus sp. 50 billion cpu (BIO-K PLUS) 50 billion cell -375 mg cap capsule 2017 at Unknown time  No Yes   Sig: Take 1 Cap by mouth daily. losartan (COZAAR) 50 mg tablet 2017 at Unknown time  No Yes   Sig: Take 1 Tab by mouth daily. Indications: Chronic Heart Failure, hypertension   magnesium oxide (MAG-OX) 400 mg tablet 2017 at Unknown time  No Yes   Sig: Take 1 Tab by mouth daily. metoprolol tartrate (LOPRESSOR) 25 mg tablet 2017 at Unknown time  No Yes   Sig: Take 1 Tab by mouth every twelve (12) hours.  Indications: hypertension, VENTRICULAR RATE CONTROL IN ATRIAL FIBRILLATION   polyethylene glycol (MIRALAX) 17 gram packet 8/25/2017 at Unknown time  No Yes   Sig: Take 1 Packet by mouth daily. potassium chloride (KLOR-CON) 20 mEq packet 8/25/2017 at Unknown time  No Yes   Sig: Take 1 Packet by mouth daily. vancomycin (VANCOCIN) 1,000 mg injection 8/25/2017 at Unknown time  No Yes   Sig: As per ID and based on vanc levels   warfarin (COUMADIN) 5 mg tablet 8/25/2017 at Unknown time  No Yes   Sig: Take 1 Tab by mouth nightly. zinc sulfate (ZINCATE) 220 (50) mg capsule 8/25/2017 at Unknown time  No Yes   Sig: Take 1 Cap by mouth daily. Facility-Administered Medications: None        Review Of Systems:   CONSTITUTIONAL: No weight loss. EYES: No blurred vision and no eye discharge. ENT: No nasal discharge. No ear pain. CARDIOVASCULAR: No chest pain and no diaphoresis. RESPIRATORY: No cough, no hemoptysis. GI: No vomiting, no diarrhea   : No urinary frequency and no dysuria. MUSCULOSKELETAL: Significant for acute postoperative musculoskeletal pain. SKIN: No rashes. NEURO: No dizziness, no numbness. ENDOCRINE: No polyphagia and no polydipsia. HEMATOLOGY: Significant for acute postoperative anemia on top of chronic anemia. Objective:     Vital Signs:  Patient Vitals for the past 24 hrs:   BP Temp Pulse Resp SpO2 Height Weight   08/26/17 0923 159/77 - - - - - -   08/26/17 0800 159/77 98.8 °F (37.1 °C) 65 20 97 % - -   08/26/17 0631 151/72 - 67 - - - -   08/25/17 2141 127/56 98.3 °F (36.8 °C) 70 17 98 % - -   08/25/17 1535 133/65 96.7 °F (35.9 °C) 60 18 98 % - -   08/25/17 1330 126/67 97.2 °F (36.2 °C) 61 18 97 % 5' 10\" (1.778 m) 70.8 kg (156 lb 1.4 oz)        Body mass index is 22.4 kg/(m^2). Physical Examination:  GENERAL SURVEY: Patient is awake, alert, oriented x 3, sitting comfortably on the chair, not in acute respiratory distress.   HEENT: pale palpebral conjunctivae, anicteric sclerae, no nasoaural discharge, moist oral mucosa  NECK: supple, no jugular venous distention, no palpable lymph nodes  CHEST/LUNGS: symmetrical chest expansion, good air entry, clear breath sounds  HEART: adynamic precordium, good S1 S2, no S3, regular rhythm, no murmurs  ABDOMEN: flat, bowel sounds appreciated, soft, non-tender  EXTREMITIES: (+) left AKA stump with dressing, (+) WoundVac on left medial thigh, (+) ~1 cm x ~1 cm ulcer on right heel, (+) 1.5 cm x 1.5 cm wound above lateral malleolus of right ankle, pale nailbeds, no edema, full and equal pulses, no calf tenderness   NEUROLOGICAL EXAM: The patient is awake, alert and oriented x3, able to answer questions fairly appropriately, able to follow 1 and 2 step commands. Able to tell time from the wall clock. Cranial nerves II-XII are grossly intact. No gross sensory deficit. Motor strength is 4+/5 on BUE, 4+/5 on the RLE, 3+/5 on the left hip, 4-/5 on the left knee and left ankle.     Incision(s): healing well, clean, dry, and intact       Current Medications:  Current Facility-Administered Medications   Medication Dose Route Frequency    acetaminophen (TYLENOL) tablet 650 mg  650 mg Oral Q4H PRN    docusate sodium (COLACE) capsule 100 mg  100 mg Oral BID    bisacodyl (DULCOLAX) tablet 10 mg  10 mg Oral Q48H PRN    heparin (porcine) injection 5,000 Units  5,000 Units SubCUTAneous Q8H    lactobacillus sp. 50 billion cpu (BIO-K PLUS) capsule 1 Cap  1 Cap Oral DAILY    magnesium oxide (MAG-OX) tablet 400 mg  400 mg Oral DAILY    gabapentin (NEURONTIN) capsule 300 mg  300 mg Oral Q8H    losartan (COZAAR) tablet 50 mg  50 mg Oral DAILY    furosemide (LASIX) tablet 20 mg  20 mg Oral DAILY    HYDROmorphone (DILAUDID) tablet 2-4 mg  2-4 mg Oral Q4H PRN    potassium chloride (KLOR-CON) packet 20 mEq  20 mEq Oral DAILY    polyethylene glycol (MIRALAX) packet 17 g  17 g Oral DAILY    amiodarone (CORDARONE) tablet 200 mg  200 mg Oral DAILY    aspirin chewable tablet 81 mg  81 mg Oral DAILY WITH BREAKFAST    cholecalciferol (VITAMIN D3) tablet 2,000 Units  2,000 Units Oral DAILY    ferrous sulfate tablet 325 mg  1 Tab Oral DAILY WITH BREAKFAST    ascorbic acid (vitamin C) (VITAMIN C) tablet 250 mg  250 mg Oral DAILY WITH BREAKFAST    zinc sulfate (ZINCATE) capsule 220 mg  1 Cap Oral DAILY    atorvastatin (LIPITOR) tablet 40 mg  40 mg Oral QHS    metoprolol tartrate (LOPRESSOR) tablet 25 mg  25 mg Oral Q12H    cyanocobalamin tablet 1,000 mcg  1,000 mcg Oral DAILY    WARFARIN INFORMATION NOTE (COUMADIN)   Other Rx Dosing/Monitoring    ampicillin-sulbactam (UNASYN) 3 g in 0.9% sodium chloride (MBP/ADV) 100 mL MBP  3 g IntraVENous Q6H       Functional Assessment:     Occupational Therapy   Prior Level of Function  Pre-Admission Screen  Post-Admission Evaluation   Eating   Independent Eating   Independent Eating  Functional Level: 6   Grooming   Independent Grooming   Moderate Assist Grooming  Functional Level: 7   Upper Body Dressing   Independent Upper Body Dressing   Minimal Assist Upper Body Dressing  Functional Level: 7   Lower Body Dressing   Independent Lower Body Dressing   Maximal Assist Lower Body Dressing  Functional Level: 4   Bladder Management   Independent Bladder Management   Maximal Assist Toileting  Functional Level: 3   Bowel Management   Independent Bowel Management   Maximal Assist      Physical Therapy   Prior Level of Function  Pre-Admission Screen  Post-Admission Evaluation   Ambulation   Modified Independent Ambulation   Minimal Assist Gait  Amount of Assistance: 4 (Contact guard assistance)  Distance (ft): 40 Feet (ft) (x2 reps)  Assistive Device: Walker, rolling   Bed Mobility   Independent Bed Mobility   Minimal Assist Bed/Mat Mobility  Rolling Right : 7 (Independent)  Rolling Left : 7 (Independent)  Supine to Sit : 7 (Independent)  Sit to Supine : 7 (Independent)   Supine to Sit   Independent Supine to Sit   Minimal Assist Bed/Mat Mobility  Rolling Right : 7 (Independent)  Rolling Left : 7 (Independent)  Supine to Sit : 7 (Independent)  Sit to Supine : 7 (Independent)   Sit to Stand   Independent Sit to Stand   Minimal Assist Bed/Mat Mobility  Rolling Right : 7 (Independent)  Rolling Left : 7 (Independent)  Supine to Sit : 7 (Independent)  Sit to Supine : 7 (Independent)   Bed/Chair Transfers   Independent Bed/Chair Transfers   Minimal Assist Transfers  Other: Stand-step with RW with CGA/SBA   Toilet Transfers   Independent Toilet Transfers   Minimal Assist Toilet Transfers  Toilet Transfer Score: 4     Speech and Language Pathology  Post-Admission Evaluation     Comprehension (Native Language)  Primary Mode of Comprehension: Auditory  Score: 6     Expression (Native Language)  Primary Mode of Expression: Verbal  Score: 6     Social Interaction/Pragmatics  Score: 5     Problem Solving  Score: 4     Memory  Score: 4       Legend:   7 - Independent   6 - Modified Independent   5 - Standby Assistance / Supervision / Set-up   4 - Minimum Assistance / Contact Guard Assistance   3 - Moderate Assistance   2 - Maximum Assistance   1 - Total Assistance / Dependent       Labs on Admission:  Recent Results (from the past 24 hour(s))   CBC WITH AUTOMATED DIFF    Collection Time: 08/26/17  7:10 AM   Result Value Ref Range    WBC 13.6 (H) 4.6 - 13.2 K/uL    RBC 3.01 (L) 4.70 - 5.50 M/uL    HGB 8.6 (L) 13.0 - 16.0 g/dL    HCT 26.5 (L) 36.0 - 48.0 %    MCV 88.0 74.0 - 97.0 FL    MCH 28.6 24.0 - 34.0 PG    MCHC 32.5 31.0 - 37.0 g/dL    RDW 18.3 (H) 11.6 - 14.5 %    PLATELET 992 (H) 304 - 420 K/uL    MPV 9.0 (L) 9.2 - 11.8 FL    NEUTROPHILS 78 (H) 40 - 73 %    LYMPHOCYTES 14 (L) 21 - 52 %    MONOCYTES 8 3 - 10 %    EOSINOPHILS 0 0 - 5 %    BASOPHILS 0 0 - 2 %    ABS. NEUTROPHILS 10.5 (H) 1.8 - 8.0 K/UL    ABS. LYMPHOCYTES 1.9 0.9 - 3.6 K/UL    ABS. MONOCYTES 1.1 0.05 - 1.2 K/UL    ABS. EOSINOPHILS 0.1 0.0 - 0.4 K/UL    ABS.  BASOPHILS 0.0 0.0 - 0.06 K/UL    DF AUTOMATED     MAGNESIUM    Collection Time: 08/26/17  7:10 AM   Result Value Ref Range    Magnesium 1.9 1.6 - 2.6 mg/dL   METABOLIC PANEL, BASIC    Collection Time: 08/26/17  7:10 AM   Result Value Ref Range    Sodium 133 (L) 136 - 145 mmol/L    Potassium 3.9 3.5 - 5.5 mmol/L    Chloride 99 (L) 100 - 108 mmol/L    CO2 26 21 - 32 mmol/L    Anion gap 8 3.0 - 18 mmol/L    Glucose 96 74 - 99 mg/dL    BUN 8 7.0 - 18 MG/DL    Creatinine 0.62 0.6 - 1.3 MG/DL    BUN/Creatinine ratio 13 12 - 20      GFR est AA >60 >60 ml/min/1.73m2    GFR est non-AA >60 >60 ml/min/1.73m2    Calcium 9.0 8.5 - 10.1 MG/DL   PROTHROMBIN TIME + INR    Collection Time: 08/26/17  7:10 AM   Result Value Ref Range    Prothrombin time 14.4 11.5 - 15.2 sec    INR 1.2 0.8 - 1.2     FERRITIN    Collection Time: 08/26/17  7:10 AM   Result Value Ref Range    Ferritin 1066 (H) 8 - 388 NG/ML   RETICULOCYTE COUNT    Collection Time: 08/26/17  7:10 AM   Result Value Ref Range    Reticulocyte count 4.7 (H) 0.5 - 2.3 %   VITAMIN D, 25 HYDROXY    Collection Time: 08/26/17  7:10 AM   Result Value Ref Range    Vitamin D 25-Hydroxy 31.1 30 - 100 ng/mL       Estimated Glomerular Filtration Rate:  On admission, estimated GFR based on a Creatinine of 0.62 mg/dl:   Using CKD-EPI = 108.6 mL/min/1.73m2   Using MDRD = 141.1 mL/min/1.73m2      Assessment:     Primary Rehabilitation Diagnosis  1. Impaired Mobility and ADLs  2. S/P Left above-the-knee amputation (8/18/2017 - Dr. Nando Weaver)  3.  History of ischemic rest pain of the left lower extremity due to severe peripheral vascular disease     Benign hypertensive heart disease with systolic congestive heart failure, NYHA class 2  I11.0, I50.20    DDD (degenerative disc disease), lumbar M51.36    Erectile dysfunction N52.9    Spinal stenosis of lumbar region with radiculopathy M48.06, M54.16    Hereditary peripheral neuropathy G60.9    Aortoiliac occlusive disease  I74.09    Atherosclerosis of native artery of both lower extremities with intermittent claudication I70.213    Peripheral artery disease  I73.9    Coronary artery disease involving native coronary artery of native heart I25.10    Paroxysmal atrial fibrillation  I48.0    Acute blood loss as cause of postoperative anemia D62    Anticoagulated on Coumadin Z51.81, Z79.01    Ischemic cardiomyopathy I25.5    Chronic systolic heart failure  G16.97    Carotid artery disease  I77.9    Dyslipidemia E78.5    Euthyroid sick syndrome E07.81    Aftercare following surgery of the circulatory system Z48.812    Status post femoral-popliteal bypass surgery Z95.828    History of cardioversion Z98.890    Critical ischemia of right lower extremity I99.8    History of vitamin D deficiency Z86.39    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    Infection of vascular bypass graft  T82. 7XXA    Chronic anemia D64.9    Chronic ulcer of right heel (HCC) L97.419    Prolonged Q-T interval on ECG R94.31    Aftercare for amputation stump Z47.81    Moderate to severe pulmonary hypertension  I27.2    Adverse effect of amiodarone T46.2X5A    Skin ulcer of malleolar area of right ankle  L97.319        Willingness to participate in the program: Good      Rehabilitation Potential: Good      Plan:     1. Medical Issues being followed closely:    [x]  Fall and safety precautions     [x]  Wound Care     [x]  Bowel and Bladder Function     [x]  Fluid Electrolyte and Nutrition Balance     [x]  Pain Control      2. Issues that 24 hour rehabilitation nursing is following:    [x]  Fall and safety precautions     [x]  Wound Care     [x]  Bowel and Bladder Function     [x]  Fluid Electrolyte and Nutrition Balance     [x]  Pain Control      [x]  Assistance with and education on in-room safety with transfers to and from the bed, wheelchair, toilet and shower.       3. Acute rehabilitation plan of care:    [x]  Patient to be evaluated and treated by:           [x]  Physical Therapy           [x]  Occupational Therapy           []  Speech Therapy     []  Hold Rehab until further notice     5. Medications:    [x]  MAR Reviewed     [x]  Continue Present Medications     6. DVT Prophylaxis:      []  Lovenox     [x]  Unfractionated Heparin     [x]  Coumadin     []  NOAC     []  VASQUEZ Stockings     []  Sequential Compression Device     []  None     7. Rehabilitation program and expectations from patient, as well as medical issues discussed with the patient. REHABILITATION PLAN:    1. The patient is being admitted to a comprehensive acute inpatient rehabilitation program consisting of at least 180 minutes a day, 5 out of 7 days a week of  combined physical and occupational therapy, and close supervision by a physician with special training and experience in rehabilitation medicine. 2. The patient's prognosis for significant practical improvement within a reasonable period of time appears to be good. 3. The estimated length of stay is 13 days. 4. The patient/family has a good understanding of our discharge process. The patient has potential to make improvement and is in need of at least two of the following multidisciplinary therapies including but not limited to physical, occupational, speech, nutritional services, wound care, prosthetics and orthotics. The patient is expected to be able to return to home with home health therapy and family support. 5. Given the patient's multiple co-morbidities and risk for further medical complications, rehabilitation services could not be safely provided at a lower level of care such as a skilled nursing facility. 6. Physical therapy for therapeutic exercise, progressive mobility, gait training, transfer training, bed mobility training, patient and family education, and wheelchair mobility training. Physical therapy goals to address - extremity function, range of motion, balance, safety awareness, independence in transfers, activity tolerance, independence in bed mobility, and independence in ambulation.   7. Occupational therapy for self-care home management, transfer training, therapeutic exercise, activity, wheelchair mobility training. Occupational therapy goals to address - extremity function, cognition, balance, activity tolerance, independence in functional transfers, range of motion, safety awareness, independence in ADL  and independence in home management skills. 8. Specialized 24 hour rehabilitation nursing care for bowel and bladder retraining, disease management, pain management, pressure ulcer prevention and management per policy, education on pressure relief techniques, embolism prevention, nutrition management, hydration management, transfer training and   medication distribution. 9. Nutrition and Dietary services will be obtained for assessment of adequate calorie needs, hydration and calorie counts as appropriate. 10. Therapeutic recreation for leisure skills. 6. Rehab psychology for coping skills. 12. Social work services for patient and family counseling and safe discharge planning. 13. I will be in charge of the inpatient rehab program. Full details of the inpatient rehab program will be outlined in the initial team conference. REHABILITATION GOALS:  Improve functional and activities of daily living skills in order to return back to independent living with family support. MEDICAL PLAN:  > S/P Left above-the-knee amputation (8/18/2017 - Dr. Clarence Man); History of ischemic rest pain of the left lower extremity due to severe peripheral artery disease   > Wound vac dressing changes by staff nurses every Monday, Wednesday, & Friday on day shift. Measure wound size & document with each dressing change. Settings: 125mmHG low continuous suction. Measure wound size & document with each dressing change. Change canister when 3/4 full. May use saline dressings daily until wound vac initiated.   > Mepilex Border dressings to left AKA staple line q2days & prn soilage.  Wrap with ace wrap in figure 8 form.   > Staples to be removed on 9/15/2017    > S/P Removal of infected graft; Repair of left superficial femoral artery; Repair of left common femoral artery (7/25/2017 - Dr. 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   > Ampicillin Sulbactam 3 grams IVPB q 6 hr (STOP DATE: 9/19/2017)    > Acute Postoperative Blood Loss Anemia   > Hgb/Hct (8/24/2017, prior to admission to the ARU) = 8.4/25.6   > Anemia work-up (8/22/2017) showed serum iron 15, TIBC 146, iron % saturation 10   > Hgb/Hct (8/26/2017, on admission) = 8.6/26.5   > FeSO4 325 mg PO once daily with breakfast    > Ascorbic Acid 250 mg PO once daily with breakfast (to enhance the absorption of the FeSO4)     > Benign hypertensive heart disease with chronic systolic heart failure   > Furosemide 20 mg PO once daily (6AM)   > Increase Losartan from 50 mg to 100 mg PO once daily (6AM)   > Metoprolol tartrate 25 mg PO q 12 hr (9AM, 9PM)    > Paroxysmal atrial fibrillation, presently normal sinus rhythm, anticoagulated on Coumadin   > Amiodarone 200 mg PO once daily   > Metoprolol tartrate 25 mg PO q 12 hr (9AM, 9PM)   > Heparin 5,000 units SC q 8 hr until INR is greater than 2.0 for 2 determinations   > Target INR = 2 to 3   > INR 1.2   > Patient received Coumadin 7.5 mg PO last night   > Coumadin 7.5 mg PO tonight    > Chronic ulcer of right heel    > Apply rigid heel suspension boots on both feet when supine in bed    > Skin ulcer of malleolar area of right ankle   > Cleanse wound with saline, apply Aquacel ag dressing, Mepilex Border to right lateral ankle wound every 48hrs & prn soilage. > Analgesia   > Acetaminophen 650 mg PO q 4 hr PRN for pain level less than 5/10   > Hydromorphone 2-4 mg PO q 4 hr PRN for pain level greater than 4/10      PRECAUTIONS:  1. Safety/fall precautions. 2. Deep venous thrombosis precautions.        POTENTIAL BARRIERS TO DISCHARGE:   1. Risk for falls.  2. The patient lives alone. 3. Family limited in ability to provide constant care. RELEVANT CHANGES SINCE PREADMISSION SCREENING: I have compared the patients medical and functional status at the time of the pre-admission screening and on this post-admission evaluation. The preadmission screen and findings from therapy evaluations both support my post admission physician evaluation, deeming this patient to be an appropriate candidate for the IRF. The patient requires multidisciplinary treatment, physician oversight and intensive therapy not provided at a lower level of care. By signing this document, I acknowledge that I have personally performed a full physical examination on this patient within 24 hours of admission to this inpatient rehabilitation facility and have determined the patient to be able to tolerate the above course of treatment at an intensive level for a reasonable period of time. I will be completing a detailed individualized plan of care for this patient by day #4 of the patients stay based upon the Pre-Admission Screen, the Post-Admission Evaluation, and the therapy evaluations.       SignedFlorinda Lesches, MD    August 26, 2017

## 2017-08-25 NOTE — IP AVS SNAPSHOT
[facility] 
 
 
 [address] Patient: Richelle Mak MRN: [id number] Current Discharge Medication List  
  
START taking these medications Dose & Instructions Dispensing Information Comments Morning Noon Evening Bedtime  
 ampicillin-sulbactam 3 gram 3 g IVPB Your last dose was: Your next dose is:    
   
   
 Dose:  3 g  
3 g by IntraVENous route every six (6) hours for 11 days. [STOP DATE: 9/19/2017] Quantity:  44 Dose Refills:  0  
     
   
   
   
  
 potassium chloride 20 mEq packet Commonly known as:  KLOR-CON Your last dose was: Your next dose is:    
   
   
 Dose:  20 mEq Take 1 Packet by mouth daily. [while on Furosemide]  Indications: hypokalemia prevention Quantity:  15 Packet Refills:  0 CONTINUE these medications which have CHANGED Dose & Instructions Dispensing Information Comments Morning Noon Evening Bedtime  
 losartan 25 mg tablet Commonly known as:  COZAAR What changed:  how much to take Your last dose was: Your next dose is:    
   
   
 Dose:  50 mg Take 2 Tabs by mouth daily. Indications: Chronic Heart Failure, hypertension Quantity:  15 Tab Refills:  0  
     
   
   
   
  
 oxyCODONE-acetaminophen 5-325 mg per tablet Commonly known as:  PERCOCET What changed:  reasons to take this Your last dose was: Your next dose is:    
   
   
 Dose:  1 Tab Take 1 Tab by mouth every four (4) hours as needed (for moderate to severe pain). Max Daily Amount: 6 Tabs. Indications: Pain Quantity:  50 Tab Refills:  0  
     
   
   
   
  
 warfarin 5 mg tablet Commonly known as:  COUMADIN What changed:  additional instructions Your last dose was: Your next dose is:    
   
   
 Dose:  5 mg Take 1 Tab by mouth nightly.  [Dose to be adjusted by Cardiology with a target INR of 2 to 3] Quantity:  15 Tab Refills:  0 CONTINUE these medications which have NOT CHANGED Dose & Instructions Dispensing Information Comments Morning Noon Evening Bedtime  
 amiodarone 200 mg tablet Commonly known as:  CORDARONE Your last dose was: Your next dose is:    
   
   
 Dose:  200 mg Take 1 Tab by mouth daily. Indications: VENTRICULAR RATE CONTROL IN ATRIAL FIBRILLATION Quantity:  60 Tab Refills:  0  
     
   
   
   
  
 ascorbic acid (vitamin C) 250 mg tablet Commonly known as:  VITAMIN C Your last dose was: Your next dose is:    
   
   
 Dose:  250 mg Take 1 Tab by mouth daily (with breakfast). Quantity:  90 Tab Refills:  2  
     
   
   
   
  
 aspirin 81 mg chewable tablet Your last dose was: Your next dose is:    
   
   
 Dose:  81 mg Take 1 Tab by mouth daily (with breakfast). Quantity:  90 Tab Refills:  3  
     
   
   
   
  
 atorvastatin 40 mg tablet Commonly known as:  LIPITOR Your last dose was: Your next dose is:    
   
   
 Dose:  40 mg Take 1 Tab by mouth nightly. Indications: DYSLIPIDEMIA Quantity:  90 Tab Refills:  3 cholecalciferol 1,000 unit tablet Commonly known as:  VITAMIN D3 Your last dose was: Your next dose is:    
   
   
 Dose:  1000 Units Take 1 Tab by mouth daily. Quantity:  90 Tab Refills:  3  
     
   
   
   
  
 cyanocobalamin 1,000 mcg tablet Commonly known as:  VITAMIN B-12 Your last dose was: Your next dose is:    
   
   
 Dose:  1000 mcg Take 1 Tab by mouth daily. Quantity:  90 Tab Refills:  3  
     
   
   
   
  
 ferrous sulfate 325 mg (65 mg iron) tablet Your last dose was: Your next dose is:    
   
   
 Dose:  325 mg Take 1 Tab by mouth daily (with breakfast). Quantity:  90 Tab Refills:  3 furosemide 20 mg tablet Commonly known as:  LASIX Your last dose was: Your next dose is:    
   
   
 1 tablet daily Quantity:  30 Tab Refills:  0  
     
   
   
   
  
 gabapentin 300 mg capsule Commonly known as:  NEURONTIN Your last dose was: Your next dose is:    
   
   
 Dose:  300 mg Take 1 Cap by mouth every eight (8) hours. Indications: NEUROPATHIC PAIN Quantity:  45 Cap Refills:  0  
     
   
   
   
  
 magnesium oxide 400 mg tablet Commonly known as:  MAG-OX Your last dose was: Your next dose is:    
   
   
 Dose:  400 mg Take 1 Tab by mouth daily. Quantity:  15 Tab Refills:  0  
     
   
   
   
  
 metoprolol tartrate 25 mg tablet Commonly known as:  LOPRESSOR Your last dose was: Your next dose is:    
   
   
 Dose:  25 mg Take 1 Tab by mouth every twelve (12) hours. Indications: hypertension, VENTRICULAR RATE CONTROL IN ATRIAL FIBRILLATION Quantity:  30 Tab Refills:  6  
     
   
   
   
  
 zinc sulfate 220 (50) mg capsule Commonly known as:  ZINCATE Your last dose was: Your next dose is:    
   
   
 Dose:  220 mg Take 1 Cap by mouth daily. Quantity:  90 Cap Refills:  0 STOP taking these medications   
 dilTIAZem 30 mg tablet Commonly known as:  CARDIZEM  
   
  
 DULoxetine 60 mg capsule Commonly known as:  CYMBALTA Where to Get Your Medications These medications were sent to [address], [address] [facility] [address] [place] [address] Hours:  24-hours Phone:  [phone]  
  losartan 25 mg tablet  
 potassium chloride 20 mEq packet  
 warfarin 5 mg tablet Information on where to get these meds will be given to you by the nurse or doctor. ! Ask your nurse or doctor about these medications  
  ampicillin-sulbactam 3 gram 3 g IVPB oxyCODONE-acetaminophen 5-325 mg per tablet

## 2017-08-25 NOTE — ROUTINE PROCESS
1415 Pt. Transfer to room 191 accompanied with family members. No complaints. No signs of distress. Wound vac dressing intact. Pt denied any discomfort. Admission orders placed by Dr. Brisa Shelby. Bravo Soliman RN assisted pt with admission process.

## 2017-08-25 NOTE — DISCHARGE INSTRUCTIONS
Below-the-Knee Leg Amputation: What to Expect at Home  Your Recovery  A below-the-knee amputation is surgery to remove your leg below the knee. Your doctor removed the leg while keeping as much healthy bone, skin, blood vessel, and nerve tissue as possible. After a below-the-knee amputation, you will probably have bandages, a rigid dressing, or a cast over the remaining part of your leg (remaining limb). The leg will be swollen for at least 4 weeks after your surgery. If you have a rigid dressing or cast, your doctor will set up regular visits to change the dressing or cast and check the healing. If you have elastic bandages, your doctor will tell you how to change them. You may have pain in your remaining limb. You also may think you have feeling or pain where your leg was. This is called phantom pain. It is common and may come and go for a year or longer. Your doctor can give you medicine for both types of pain. You may have already started a rehabilitation program (rehab). You will continue this under the guidance of your doctor or physical therapist. Mynor Hansen will need to do a lot of work to recondition your muscles and relearn activities, balance, and coordination. The rehab can last as long as a year. You may have been fitted with a temporary artificial leg while you were still in the hospital. If this is the case, your doctor will teach you how to care for it. If you are getting an artificial leg, you may need to get used to it before you return to work and your other activities. You will probably not wear it all the time, so you will need to learn how to use a wheelchair, crutches, or other device. You will have to make changes in your home. Your workplace may be able to make allowances for you. Having your leg amputated is traumatic. Learning to live with new limitations can be hard and frustrating. You may feel depressed or grieve for your previous lifestyle.  It is important to understand these feelings. Talking with your family, friends, and health professionals about your frustrations is an important part of your recovery. You may also find that it helps to talk with a person who has had an amputation. Remember that even though losing a limb is difficult, it does not change who you are or prevent you from enjoying life. You will have to adapt and learn new ways to do things, but you will still be able to work and take part in sports and activities. And you can still learn, love, play, and live life to its fullest.  Many organizations can help you adjust to your new life. For example, you can find information at amputee-coalition.org. This care sheet gives you a general idea about how long it will take for you to recover. But each person recovers at a different pace. Follow the steps below to get better as quickly as possible. How can you care for yourself at home? Activity  · Be active. Talk to your doctor about what you can do. If you are active and use your remaining limb, it will heal faster. · You may shower when your doctor okays it. Wash the remaining limb with soap and water, and pat it dry. You may need help doing this at first.  · You may need to adapt your car to your situation before you drive. · You will probably be able to return to work and your usual routine when your remaining limb heals. This can be as soon as 4 to 8 weeks after surgery, but it may take longer. Diet  · You can eat your normal diet. If your stomach is upset, try bland, low-fat foods like plain rice, broiled chicken, toast, and yogurt. · You may notice that your bowel movements are not regular right after your surgery. This is common. Try to avoid constipation and straining with bowel movements. Take a fiber supplement every day. If you have not had a bowel movement after a couple of days, ask your doctor about taking a mild laxative.   Medicines  · Your doctor will tell you if and when you can restart your medicines. He or she will also give you instructions about taking any new medicines. · If you take blood thinners, such as warfarin (Coumadin), clopidogrel (Plavix), or aspirin, be sure to talk to your doctor. He or she will tell you if and when to start taking those medicines again. Make sure that you understand exactly what your doctor wants you to do. · Be safe with medicines. Take pain medicines exactly as directed. ¨ If the doctor gave you a prescription medicine for pain, take it as prescribed. ¨ If you are not taking a prescription pain medicine, ask your doctor if you can take an over-the-counter medicine. · If you think your pain medicine is making you sick to your stomach:  ¨ Take your medicine after meals (unless your doctor has told you not to). ¨ Ask your doctor for a different pain medicine. · If your doctor prescribed antibiotics, take them as directed. Do not stop taking them just because you feel better. You need to take the full course of antibiotics. Remaining limb care  · You may have bandages, a rigid dressing, or a cast on your remaining limb. Your doctor will tell you how to take care of it. Depending on your dressing and the doctor's instructions:  ¨ Check your remaining limb daily for irritation, skin breaks, and redness. Tell your doctor about any problems you see. ¨ Wash your remaining limb with mild soap and warm water every night. Pat it dry. · If you have a temporary artificial leg, remove it before you go to sleep. Exercise  · Rehabilitation is a series of exercises you do after your surgery. This helps you learn to use your remaining limb and artificial leg. You will work with your doctor and physical therapist to plan this exercise program. To get the best results, you need to do the exercises correctly and as often and as long as your doctor tells you. Your rehab program will give you a number of exercises to do. Always do them as your therapist tells you.   Other instructions  · Preventing contractures is very important. A contracture occurs when a joint becomes stuck in one position. If this happens, it may be hard or impossible to straighten your remaining limb and use an artificial leg. ¨ Make sure you put equal weight on both hips when you sit. Use firm chairs, and sit up straight. ¨ Keep your remaining limb flat with your knees straight and your legs together while you are lying on your back. ¨ Lie on your stomach as much as possible to stretch your hip joint. ¨ Do not sit for more than an hour or two. Stand, or lie on your stomach now and then. ¨ Do not put pillows under your hips or knees or between your thighs. ¨ Do not rest your remaining limb on crutch handles or a wheelchair. Follow-up care is a key part of your treatment and safety. Be sure to make and go to all appointments, and call your doctor if you are having problems. It's also a good idea to know your test results and keep a list of the medicines you take. When should you call for help? Call 911 anytime you think you may need emergency care. For example, call if:  · You passed out (lost consciousness). · You have sudden chest pain and shortness of breath, or you cough up blood. · You have severe trouble breathing. Call your doctor now or seek immediate medical care if:  · You have loose stitches, or your incision comes open. · You have bleeding from the incision in your remaining limb that suddenly increases or does not stop when your doctor said it should. · You have signs of infection, such as:  ¨ Increased pain, swelling, warmth, or redness. ¨ Red streaks leading from the incision. ¨ Pus draining from the incision. ¨ A fever. · You are sick to your stomach or cannot keep fluids down. · You have pain that does not get better after you take pain medicine.   Watch closely for any changes in your health, and be sure to contact your doctor if:  · You do not have a bowel movement after taking a laxative. Where can you learn more? Go to http://yuniel-lyndsay.info/. Enter H803 in the search box to learn more about \"Below-the-Knee Leg Amputation: What to Expect at Home. \"  Current as of: 2017  Content Version: 11.3  © 5204-0741 [employer]. Care instructions adapted under license by [employer] (which disclaims liability or warranty for this information). If you have questions about a medical condition or this instruction, always ask your healthcare professional. [address] any warranty or liability for your use of this information. Patient armband removed and shredded  MyChart Activation    Thank you for requesting access to [facility]. Please follow the instructions below to securely access and download your online medical record. [facility] allows you to send messages to your doctor, view your test results, renew your prescriptions, schedule appointments, and more. How Do I Sign Up? 1. In your internet browser, go to [email or web address]  2. Click on the First Time User? Click Here link in the Sign In box. You will be redirect to the New Member Sign Up page. 3. Enter your [facility] Access Code exactly as it appears below. You will not need to use this code after youve completed the sign-up process. If you do not sign up before the expiration date, you must request a new code. MyChart Access Code: Activation code not generated  Current [facility] Status: Patient Declined (This is the date your [employer] access code will )    4. Enter the last four digits of your Social Security Number (xxxx) and Date of Birth (mm/dd/yyyy) as indicated and click Submit. You will be taken to the next sign-up page. 5. Create a [facility] ID. This will be your [facility] login ID and cannot be changed, so think of one that is secure and easy to remember. 6. Create a [facility] password. You can change your password at any time.   7. Enter your Password Reset Question and Answer. This can be used at a later time if you forget your password. 8. Enter your e-mail address. You will receive e-mail notification when new information is available in [address] 9. Click Sign Up. You can now view and download portions of your medical record. 10. Click the Download Summary menu link to download a portable copy of your medical information. Additional Information    If you have questions, please visit the Frequently Asked Questions section of the [email or web address] website at [email or web address]. Remember, [email or web address] is NOT to be used for urgent needs. For medical emergencies, dial 911. DISCHARGE SUMMARY from Nurse    The following personal items are in your possession at time of discharge:    Dental Appliances: Lowers, Uppers  Visual Aid: None                            PATIENT INSTRUCTIONS:    After general anesthesia or intravenous sedation, for 24 hours or while taking prescription Narcotics:  · Limit your activities  · Do not drive and operate hazardous machinery  · Do not make important personal or business decisions  · Do  not drink alcoholic beverages  · If you have not urinated within 8 hours after discharge, please contact your surgeon on call. Report the following to your surgeon:  · Excessive pain, swelling, redness or odor of or around the surgical area  · Temperature over 100.5  · Nausea and vomiting lasting longer than 4 hours or if unable to take medications  · Any signs of decreased circulation or nerve impairment to extremity: change in color, persistent  numbness, tingling, coldness or increase pain  · Any questions        What to do at Home:  Recommended activity: Activity as tolerated    If you experience any of the following symptoms uncontrolled pain, profuse bleeding or drainage from site, redness or swelling, nausea, vomiting,constipation, diarrhea, chest pains, short of breath please follow up with PCP.       *  Please give a list of your current medications to your Primary Care Provider. *  Please update this list whenever your medications are discontinued, doses are      changed, or new medications (including over-the-counter products) are added. *  Please carry medication information at all times in case of emergency situations. These are general instructions for a healthy lifestyle:    No smoking/ No tobacco products/ Avoid exposure to second hand smoke    Surgeon General's Warning:  Quitting smoking now greatly reduces serious risk to your health. Obesity, smoking, and sedentary lifestyle greatly increases your risk for illness    A healthy diet, regular physical exercise & weight monitoring are important for maintaining a healthy lifestyle    You may be retaining fluid if you have a history of heart failure or if you experience any of the following symptoms:  Weight gain of 3 pounds or more overnight or 5 pounds in a week, increased swelling in our hands or feet or shortness of breath while lying flat in bed. Please call your doctor as soon as you notice any of these symptoms; do not wait until your next office visit. Recognize signs and symptoms of STROKE:    F-face looks uneven    A-arms unable to move or move unevenly    S-speech slurred or non-existent    T-time-call 911 as soon as signs and symptoms begin-DO NOT go       Back to bed or wait to see if you get better-TIME IS BRAIN. Warning Signs of HEART ATTACK     Call 911 if you have these symptoms:   Chest discomfort. Most heart attacks involve discomfort in the center of the chest that lasts more than a few minutes, or that goes away and comes back. It can feel like uncomfortable pressure, squeezing, fullness, or pain.  Discomfort in other areas of the upper body. Symptoms can include pain or discomfort in one or both arms, the back, neck, jaw, or stomach.  Shortness of breath with or without chest discomfort.    Other signs may include breaking out in a cold sweat, nausea, or lightheadedness. Don't wait more than five minutes to call 911 - MINUTES MATTER! Fast action can save your life. Calling 911 is almost always the fastest way to get lifesaving treatment. Emergency Medical Services staff can begin treatment when they arrive -- up to an hour sooner than if someone gets to the hospital by car. The discharge information has been reviewed with the patient. The patient verbalized understanding. Discharge medications reviewed with the patient and appropriate educational materials and side effects teaching were provided.

## 2017-08-25 NOTE — IP AVS SNAPSHOT
Summary of Care Report The Summary of Care report has been created to help improve care coordination. Users with access to [employer] or [employer] (Web-based application) may access additional patient information including the Discharge Summary. If you are not currently a [employer] user and need more information, please call the number listed below in the [place] [address] section and ask to be connected with Medical Records. Facility Information Name Address Phone [address] [place] [address] [address] [facility] [address] [phone] Patient Information Patient Name Sex KALI Baez ([id number]) Male 1960 Discharge Information Admitting Provider Service Area Unit Lionel Price MD / [address] [facility] [address] [facility] Unit / [phone] Discharge Provider Discharge Date/Time Discharge Disposition Destination Lionel Price MD / [phone] 2017 Morning (Pending) [facility] (none) Patient Language Language ENGLISH [13] Hospital Problems as of 2017  Reviewed: 2017  1:25 PM by Lionel Price MD  
  
  
  
 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  
  Peripheral artery disease [facility]) (Chronic)  2017 - Present 2017 by Lionel Price MD Yes Entered by Zohaib Mar MD  
  Paroxysmal atrial fibrillation [facility])  Unknown - Present 2017 by Lionel Price MD Yes Entered by Genie Beltran MD  
  Acute blood loss as cause of postoperative anemia  2017 - Present 2017 by Lionel Price MD Yes   Entered by Lionel Price MD  
 Impaired mobility and ADLs  7/24/2017 - Present 8/25/2017 by Gumaro Castellanos MD Yes Entered by Gumaro Castellanos MD  
  Anticoagulated on Coumadin (Chronic)  Unknown - Present 8/25/2017 by Gumaro Castellanos MD Yes Entered by Gumaro Castellanos MD  
  Aftercare following surgery of the circulatory system  7/25/2017 - Present 8/25/2017 by Gumaro Castellanos MD Yes Entered by Gumaro Castellanos MD  
  Overview Addendum 8/21/2017 10:22 PM by Gumaro Castellanos MD  
   S/P Removal of infected graft; Repair of left superficial femoral artery; Repair of left common femoral artery (7/25/2017 - Dr. Linda Hannah); S/P Right axillary femoral jump bypass interposition; Removal of infected right axillary femoral bypass; Wound VAC placement of upper thigh 1.2 cm x 5.2 cm x 1.8 cm and groin 4 cm x 0.5 cm x 0.2 cm (8/18/2017 - Dr. Linda Hannah) Infection of vascular bypass graft ([address]  7/24/2017 - Present 8/25/2017 by Gumaro Castellanos MD Yes Entered by Gumaro Castellanos MD  
  Overview Signed 8/7/2017  2:19 PM by Gumaro Castellanos MD  
   Left lower extremity Chronic ulcer of right heel ([address] (Chronic)  8/8/2017 - Present 8/25/2017 by Gumaro Castellanos MD Yes Entered by Gumaro Castellanos MD  
  Ischemic rest pain of lower extremity ([address]  8/14/2017 - Present 8/25/2017 by Gumaro Castellanos MD Yes Entered by Gumaro Castellanos MD  
  Overview Signed 8/15/2017 11:09 AM by Gumaro Castellanos MD  
   Left * (Principal)Status post above knee amputation of left lower extremity ([address]  8/18/2017 - Present 8/25/2017 by Gumaro Castellanos MD Yes Entered by Gumaro Castellanos MD  
  Overview Signed 8/21/2017 10:21 PM by Gumaro Castellanos MD  
   S/P Left above-the-knee amputation (8/18/2017 - Dr. Linda Hannah) Aftercare for amputation stump  8/18/2017 - Present 8/25/2017 by Gumaro Castellanos MD Yes   Entered by Gumaro Castellanos MD  
  Overview Signed 8/21/2017 10:25 PM by Gumaro Castellanos MD  
 S/P Left above-the-knee amputation (8/18/2017 - Dr. Helder Martin) Skin ulcer of malleolar area of right ankle ([address] (Chronic)  Unknown - Present 8/25/2017 by Bobo Garcia MD Yes Entered by Bobo Garcia MD  
  
Non-Hospital Problems as of 9/8/2017  Reviewed: 9/7/2017  1:25 PM by Bobo Garcia MD  
  
  
  
 Class Noted - Resolved Last Modified Active Problems DDD (degenerative disc disease), lumbar (Chronic)  1/26/2015 - Present 4/21/2017 by Bobo Garcia MD  
  Entered by Delisa Flores MD  
  Erectile dysfunction (Chronic)  7/5/2016 - Present 4/21/2017 by Bobo Garcia MD  
  Entered by Delisa Flores MD  
  Spinal stenosis of lumbar region with radiculopathy (Chronic)  5/4/2015 - Present 4/21/2017 by Bobo Garcia MD  
  Entered by Madonna Peralta Overview Signed 4/21/2017  2:35 PM by MD Dr. Maribel Sandoval Hereditary peripheral neuropathy (Chronic)  11/15/2016 - Present 4/21/2017 by Bobo Garcia MD  
  Entered by Madonna Peralta Aortoiliac occlusive disease ([facility] (Chronic)  1/25/2017 - Present 6/27/2017 by Jennifer Brady MD  
  Entered by Jennifer Brady MD  
  Atherosclerosis of native artery of both lower extremities with intermittent claudication [facility]) (Chronic)  1/25/2017 - Present 8/25/2017 by Bobo Garcia MD  
  Entered by Jennifer Brady MD  
  Coronary artery disease involving native coronary artery of native heart (Chronic)  3/15/2017 - Present 8/21/2017 by Bobo Garcia MD  
  Entered by JUNE Jones Overview Signed 3/15/2017  2:19 PM by JUNE Jones Successful stenting of Cx (Xience LESLEY) and RCA (Xience LESLEY) to 0% by Dr. Rashid Calderon on 3/15/17.  
  
  
  Ischemic cardiomyopathy (Chronic)  Unknown - Present 8/21/2017 by Bobo Garcia MD  
  Entered by Bobo Garcia MD  
  Chronic systolic heart failure [facility]) (Chronic)  Unknown - Present 8/21/2017 by Bobo Garcia MD  
 Entered by Cherelle Dukes MD  
  Carotid artery disease [facility]) (Chronic)  Unknown - Present 4/21/2017 by Cherelle Dukes MD  
  Entered by Cherelle Dukes MD  
  Dyslipidemia (Chronic)  Unknown - Present 4/21/2017 by Cherelle Dukes MD  
  Entered by Cherelle Dukes MD  
  Euthyroid sick syndrome  4/6/2017 - Present 4/23/2017 by Cherelle Dukes MD  
  Entered by Cherelle Dukes MD  
  Status post femoral-popliteal bypass surgery  4/21/2017 - Present 4/21/2017 by Cherelle Dukes MD  
  Entered by Cherelle Dukes MD  
  Overview Signed 4/21/2017  2:33 PM by Cherelle Dukes MD  
   S/P Right axillary to bifemoral artery bypass using an 8 mm Propaten graft from the right axilla to the right common femoral artery with a jump femoral-femoral bypass with another 8 mm Propaten external ring bypass; Right common femoral artery, and profunda femoris artery and superficial femoral artery endarterectomy causing an extensive surgery on the right side (4/4/2017 - Dr. Ar Bello) S/P Cutdown of femoral-femoral bypass, more on the left groin side; Right lower extremity angiography with first-order catheterization (4/7/2017 - Dr. Ar Bello) S/P Right femoral to above-knee popliteal bypass with an 8 mm PTFE graft (4/10/2017 - Dr. Kaylyn Camilo) History of cardioversion  4/18/2017 - Present 4/21/2017 by Cherelle Dukes MD  
  Entered by Cherelle Dukes MD  
  Overview Signed 4/21/2017  2:34 PM by Cherelle Dukes MD  
   S/P Synchronized external cardioversion (4/18/2017 - Dr. Yasmine Montejo) Critical ischemia of lower extremity  4/10/2017 - Present 4/21/2017 by Cherelle Dukes MD  
  Entered by Cherelle Dukes MD  
  Overview Signed 4/21/2017  4:41 PM by Cherelle Dukes MD  
   Right lower limb   History of vitamin D deficiency  4/22/2017 - Present 8/7/2017 by Cherelle Dukes MD  
  Entered by Cherelle Dukes MD  
  Overview Addendum 8/7/2017  2:18 PM by Cherelle Dukes MD  
 Vitamin D 25-Hydroxy (4/22/2017) = 12.0; Vitamin D 25-Hydroxy (5/26/2017) = 38.7 Pseudoaneurysm of femoral artery ([facility]  6/27/2017 - Present 6/27/2017 by Jennifer Brady MD  
  Entered by Jennifer Brady MD  
  Chronic anemia (Chronic)  Unknown - Present 8/7/2017 by Bobo Garcia MD  
  Entered by Bobo Garcia MD  
  Prolonged Q-T interval on ECG  8/19/2017 - Present 8/21/2017 by Bobo Garcia MD  
  Entered by Bobo Garcia MD  
  Moderate to severe pulmonary hypertension ([facility] (Chronic)  7/23/2017 - Present 8/21/2017 by Bobo Garcia MD  
  Entered by Bobo Garcia MD  
  Adverse effect of amiodarone  8/19/2017 - Present 8/21/2017 by Bobo Garcia MD  
  Entered by Bobo Garcia MD  
  
You are allergic to the following Allergen Reactions Morphine Other (comments) Patient gets confused with morphine. Current Discharge Medication List  
  
START taking these medications Dose & Instructions Dispensing Information Comments  
 ampicillin-sulbactam 3 gram 3 g IVPB Dose:  3 g  
3 g by IntraVENous route every six (6) hours for 11 days. [STOP DATE: 9/19/2017] Quantity:  44 Dose Refills:  0  
   
 potassium chloride 20 mEq packet Commonly known as:  KLOR-CON Dose:  20 mEq Take 1 Packet by mouth daily. [while on Furosemide]  Indications: hypokalemia prevention Quantity:  15 Packet Refills:  0 CONTINUE these medications which have CHANGED Dose & Instructions Dispensing Information Comments  
 losartan 25 mg tablet Commonly known as:  COZAAR What changed:  how much to take Dose:  50 mg Take 2 Tabs by mouth daily. Indications: Chronic Heart Failure, hypertension Quantity:  15 Tab Refills:  0  
   
 oxyCODONE-acetaminophen 5-325 mg per tablet Commonly known as:  PERCOCET What changed:  reasons to take this Dose:  1 Tab Take 1 Tab by mouth every four (4) hours as needed (for moderate to severe pain). Max Daily Amount: 6 Tabs. Indications: Pain Quantity:  50 Tab Refills:  0  
   
 warfarin 5 mg tablet Commonly known as:  COUMADIN What changed:  additional instructions Dose:  5 mg Take 1 Tab by mouth nightly. [Dose to be adjusted by Cardiology with a target INR of 2 to 3] Quantity:  15 Tab Refills:  0 CONTINUE these medications which have NOT CHANGED Dose & Instructions Dispensing Information Comments  
 amiodarone 200 mg tablet Commonly known as:  CORDARONE Dose:  200 mg Take 1 Tab by mouth daily. Indications: VENTRICULAR RATE CONTROL IN ATRIAL FIBRILLATION Quantity:  60 Tab Refills:  0  
   
 ascorbic acid (vitamin C) 250 mg tablet Commonly known as:  VITAMIN C Dose:  250 mg Take 1 Tab by mouth daily (with breakfast). Quantity:  90 Tab Refills:  2  
   
 aspirin 81 mg chewable tablet Dose:  81 mg Take 1 Tab by mouth daily (with breakfast). Quantity:  90 Tab Refills:  3  
   
 atorvastatin 40 mg tablet Commonly known as:  LIPITOR Dose:  40 mg Take 1 Tab by mouth nightly. Indications: DYSLIPIDEMIA Quantity:  90 Tab Refills:  3 cholecalciferol 1,000 unit tablet Commonly known as:  VITAMIN D3 Dose:  1000 Units Take 1 Tab by mouth daily. Quantity:  90 Tab Refills:  3  
   
 cyanocobalamin 1,000 mcg tablet Commonly known as:  VITAMIN B-12 Dose:  1000 mcg Take 1 Tab by mouth daily. Quantity:  90 Tab Refills:  3  
   
 ferrous sulfate 325 mg (65 mg iron) tablet Dose:  325 mg Take 1 Tab by mouth daily (with breakfast). Quantity:  90 Tab Refills:  3  
   
 furosemide 20 mg tablet Commonly known as:  LASIX  
 1 tablet daily Quantity:  30 Tab Refills:  0  
   
 gabapentin 300 mg capsule Commonly known as:  NEURONTIN Dose:  300 mg Take 1 Cap by mouth every eight (8) hours. Indications: NEUROPATHIC PAIN Quantity:  45 Cap Refills:  0  
   
 magnesium oxide 400 mg tablet Commonly known as:  MAG-OX Dose:  400 mg Take 1 Tab by mouth daily. Quantity:  15 Tab Refills:  0  
   
 metoprolol tartrate 25 mg tablet Commonly known as:  LOPRESSOR Dose:  25 mg Take 1 Tab by mouth every twelve (12) hours. Indications: hypertension, VENTRICULAR RATE CONTROL IN ATRIAL FIBRILLATION Quantity:  30 Tab Refills:  6  
   
 zinc sulfate 220 (50) mg capsule Commonly known as:  ZINCATE Dose:  220 mg Take 1 Cap by mouth daily. Quantity:  90 Cap Refills:  0 STOP taking these medications Comments  
 dilTIAZem 30 mg tablet Commonly known as:  CARDIZEM  
   
   
 DULoxetine 60 mg capsule Commonly known as:  CYMBALTA Current Immunizations Name Date Tdap 7/5/2016 Follow-up Information Follow up With Details Comments Contact Info [address] [facility] [address] [facility] [address] 
[phone] Ni Diana MD On 9/19/2017 Patient has an appointment scheduled with PCP Dr. India Medeiros. [facility] on September 19, 2017 @ 11:45am.  Patient will be seen by covering Dr. Ritchie Badillo [address] [facility] 
[phone] Catia Gilman MD On 9/21/2017 Patient has an appointment scheduled with Vascular Surgeon Dr. Donnal Cranker on September 21, 2017 @ 9:00am. [address] [place] [address] 
 [facility] [address] [facility] 
[phone] Anders Boothe DPM On 9/13/2017 Patient has an appointment scheduled with Podiatry Dr. Marin Manuel on September 13, 2017 @ 12:15pm. [address] [facility] [address] [facility] 
[phone] Discharge Instructions DISCHARGE SUMMARY from Nurse The following personal items are in your possession at time of discharge: 
 
Dental Appliances: Uppers, Lowers Visual Aid: None Home Medications: None Jewelry: None Clothing: None Other Valuables: Cell Phone PATIENT INSTRUCTIONS: 
 
 After general anesthesia or intravenous sedation, for 24 hours or while taking prescription Narcotics: · Limit your activities · Do not drive and operate hazardous machinery · Do not make important personal or business decisions · Do  not drink alcoholic beverages · If you have not urinated within 8 hours after discharge, please contact your surgeon on call. Report the following to your surgeon: 
· Excessive pain, swelling, redness or odor of or around the surgical area · Temperature over 100.5 · Nausea and vomiting lasting longer than 4 hours or if unable to take medications · Any signs of decreased circulation or nerve impairment to extremity: change in color, persistent  numbness, tingling, coldness or increase pain · Any questions What to do at Home: *  Please give a list of your current medications to your Primary Care Provider. *  Please update this list whenever your medications are discontinued, doses are 
    changed, or new medications (including over-the-counter products) are added. *  Please carry medication information at all times in case of emergency situations. These are general instructions for a healthy lifestyle: No smoking/ No tobacco products/ Avoid exposure to second hand smoke Surgeon General's Warning:  Quitting smoking now greatly reduces serious risk to your health. Obesity, smoking, and sedentary lifestyle greatly increases your risk for illness A healthy diet, regular physical exercise & weight monitoring are important for maintaining a healthy lifestyle You may be retaining fluid if you have a history of heart failure or if you experience any of the following symptoms:  Weight gain of 3 pounds or more overnight or 5 pounds in a week, increased swelling in our hands or feet or shortness of breath while lying flat in bed. Please call your doctor as soon as you notice any of these symptoms; do not wait until your next office visit. Recognize signs and symptoms of STROKE: 
 
F-face looks uneven A-arms unable to move or move unevenly S-speech slurred or non-existent T-time-call 911 as soon as signs and symptoms begin-DO NOT go Back to bed or wait to see if you get better-TIME IS BRAIN. Warning Signs of HEART ATTACK Call 911 if you have these symptoms: 
? Chest discomfort. Most heart attacks involve discomfort in the center of the chest that lasts more than a few minutes, or that goes away and comes back. It can feel like uncomfortable pressure, squeezing, fullness, or pain. ? Discomfort in other areas of the upper body. Symptoms can include pain or discomfort in one or both arms, the back, neck, jaw, or stomach. ? Shortness of breath with or without chest discomfort. ? Other signs may include breaking out in a cold sweat, nausea, or lightheadedness. Don't wait more than five minutes to call [address]! Fast action can save your life. Calling 911 is almost always the fastest way to get lifesaving treatment. Emergency Medical Services staff can begin treatment when they arrive  up to an hour sooner than if someone gets to the hospital by car. The discharge information has been reviewed with the patient and caregiver. The patient and caregiver verbalized understanding. Discharge medications reviewed with the patient and caregiver and appropriate educational materials and side effects teaching were provided. Patient armband removed and shredded MyChart Activation Thank you for requesting access to [employer]. Please follow the instructions below to securely access and download your online medical record. [employer] allows you to send messages to your doctor, view your test results, renew your prescriptions, schedule appointments, and more. How Do I Sign Up? 1. In your internet browser, go to [email or web address] 
2. Click on the First Time User? Click Here link in the Sign In box.  You will be redirect to the New Member Sign Up page. 3. Enter your [employer] Access Code exactly as it appears below. You will not need to use this code after youve completed the sign-up process. If you do not sign up before the expiration date, you must request a new code. MyChart Access Code: Activation code not generated Current [employer] Status: Patient Declined (This is the date your MyChart access code will ) 4. Enter the last four digits of your Social Security Number (xxxx) and Date of Birth (mm/dd/yyyy) as indicated and click Submit. You will be taken to the next sign-up page. 5. Create a [employer] ID. This will be your [employer] login ID and cannot be changed, so think of one that is secure and easy to remember. 6. Create a [employer] password. You can change your password at any time. 7. Enter your Password Reset Question and Answer. This can be used at a later time if you forget your password. 8. Enter your e-mail address. You will receive e-mail notification when new information is available in [address] 9. Click Sign Up. You can now view and download portions of your medical record. 10. Click the Download Summary menu link to download a portable copy of your medical information. Additional Information If you have questions, please visit the Frequently Asked Questions section of the [employer] website at [email or web address] Remember, [employer] is NOT to be used for urgent needs. For medical emergencies, dial 911. 
 
 
 
 
 
 
 
 
------------------------------------------------------------------------------------------------------------ 
 
DISCHARGE INSTRUCTIONS 1. Make sure that when you request refills at the pharmacy that the refill requests are sent to your PCP (NOT to the prescriber at the [facility]) to avoid any delays in getting your medication refills.  The physician at the [facility] will not able to order medications or refills after discharge -- Please understand that though we would like to help, it is simply not safe for our physician to order you a medication that we cannot monitor. 2. If any of the prescribed medications require a prior authorization, contact your Primary Care Physician or specialist to EITHER complete prior authorizations and paperwork on your behalf OR prescribe an alternative medication. -- Please understand that though we would like to help, it is simply not safe for our physician to order you a medication that we cannot monitor. 3. Over-the-counter (OTC) medications will NOT be prescribed. You need to buy these medications over-the-counter. ------------------------------------------------------------------------------------------------------------------- Chart Review Routing History Recipient Method Report Sent By Dustin Thibodeaux MD  
Phone: [phone] In [employer] [place] [place] [[id number]] 5/11/2017  3:31 PM 05/11/2017 Jesse eTjeda MD  
Phone: [phone] In [employer] [address], [place] [[id number]] 6/15/2017  4:08 PM 06/15/2017 [facility] Fax: [phone] Fax [facility] IP AMB RESULT REPORT Zoila Grills [[id number]] 8/2/2017  1:07 AM 8/1/2017

## 2017-08-25 NOTE — IP AVS SNAPSHOT
[address] Patient: Carlyn Nuñez MRN: [id number] You are allergic to the following Allergen Reactions Morphine Other (comments) Patient gets confused with morphine. Recent Documentation Height Weight BMI Smoking Status 1.778 m 70.8 kg 22.38 kg/m2 Former Smoker Emergency Contacts Name Discharge Info Relation Home Work Mobile Marek Caroina DISCHARGE CAREGIVER [3] Daughter [21] [phone] About your hospitalization You were admitted on:  August 25, 2017 You last received care in the:  [facility] IP REHAB UNIT You were discharged on:  September 8, 2017 Unit phone number:  [phone] Why you were hospitalized Your primary diagnosis was:  Status Post Above Knee Amputation Of Left Lower Extremity (Hcc) Your diagnoses also included:  Acute Blood Loss As Cause Of Postoperative Anemia, Aftercare Following Surgery Of The Circulatory System, Anticoagulated On Coumadin, Benign Hypertensive Heart Disease With Systolic Congestive Heart Failure, Nyha Class 2 (Hcc), Chronic Ulcer Of Right Heel (Hcc), Ischemic Rest Pain Of Lower Extremity (Hcc), Peripheral Artery Disease (Hcc), Aftercare For Amputation Stump, Impaired Mobility And Adls, Paroxysmal Atrial Fibrillation (Hcc), Infection Of Vascular Bypass Graft (Hcc), Skin Ulcer Of Malleolar Area Of Right Ankle (Hcc) Providers Seen During Your Hospitalizations Provider Role Specialty Primary office phone Joyce Lagunas MD Attending Provider Internal Medicine [phone] Your Primary Care Physician (PCP) Primary Care Physician Office Phone Office Fax Jj Garcia [phone] Follow-up Information Follow up With Details Comments Contact Info [address] [facility] 6735 [address] [facility] [address] [phone] Silvio Parker MD On 9/19/2017 Patient has an appointment scheduled with PCP  [facility]. [facility] on September 19, 2017 @ 11:45am.  Patient will be seen by covering Dr. Lovett [facility] [address] [facility] [address] 
[phone] Heidy Jara MD On 9/21/2017 Patient has an appointment scheduled with Vascular Surgeon Dr. Anay Real on September 21, 2017 @ 9:00am. [address] Parker D 
[facility]  [facility] 
[phone] Javier Levy DPM On 9/13/2017 Patient has an appointment scheduled with Podiatry Dr. Aretha Aguirre on September 13, 2017 @ 12:15pm. [address] [facility] [address] 
[phone] Your Appointments Saturday September 09, 2017 To Be Determined START OF CARE with BEAU Joyner [facility] CARE SCHEDULING/INTAKE ([facility]) [facility] SCHEDULING/INTAKE ([facility]) Tuesday September 19, 2017 11:45 AM EDT  
POST HOSPITAL with Tai Su MD  
[facility] [address] [facility] [address]  
[phone] Thursday September 21, 2017  9:00 AM EDT HOSPITAL DISCHARGE with [address], [address] [facility] ([facility]) 12[address] [facility] 100 [address] [facility]  
[phone] Current Discharge Medication List  
  
START taking these medications Dose & Instructions Dispensing Information Comments Morning Noon Evening Bedtime  
 ampicillin-sulbactam 3 gram 3 g IVPB Your last dose was: Your next dose is:    
   
   
 Dose:  3 g  
3 g by IntraVENous route every six (6) hours for 11 days. [STOP DATE: 9/19/2017] Quantity:  44 Dose Refills:  0  
     
   
   
   
  
 potassium chloride 20 mEq packet Commonly known as:  KLOR-CON  
   
 Your last dose was: Your next dose is:    
   
   
 Dose:  20 mEq Take 1 Packet by mouth daily. [while on Furosemide]  Indications: hypokalemia prevention Quantity:  15 Packet Refills:  0 CONTINUE these medications which have CHANGED Dose & Instructions Dispensing Information Comments Morning Noon Evening Bedtime  
 losartan 25 mg tablet Commonly known as:  COZAAR What changed:  how much to take Your last dose was: Your next dose is:    
   
   
 Dose:  50 mg Take 2 Tabs by mouth daily. Indications: Chronic Heart Failure, hypertension Quantity:  15 Tab Refills:  0  
     
   
   
   
  
 oxyCODONE-acetaminophen 5-325 mg per tablet Commonly known as:  PERCOCET What changed:  reasons to take this Your last dose was: Your next dose is:    
   
   
 Dose:  1 Tab Take 1 Tab by mouth every four (4) hours as needed (for moderate to severe pain). Max Daily Amount: 6 Tabs. Indications: Pain Quantity:  50 Tab Refills:  0  
     
   
   
   
  
 warfarin 5 mg tablet Commonly known as:  COUMADIN What changed:  additional instructions Your last dose was: Your next dose is:    
   
   
 Dose:  5 mg Take 1 Tab by mouth nightly. [Dose to be adjusted by Cardiology with a target INR of 2 to 3] Quantity:  15 Tab Refills:  0 CONTINUE these medications which have NOT CHANGED Dose & Instructions Dispensing Information Comments Morning Noon Evening Bedtime  
 amiodarone 200 mg tablet Commonly known as:  CORDARONE Your last dose was: Your next dose is:    
   
   
 Dose:  200 mg Take 1 Tab by mouth daily. Indications: VENTRICULAR RATE CONTROL IN ATRIAL FIBRILLATION Quantity:  60 Tab Refills:  0  
     
   
   
   
  
 ascorbic acid (vitamin C) 250 mg tablet Commonly known as:  VITAMIN C Your last dose was: Your next dose is: Dose:  250 mg Take 1 Tab by mouth daily (with breakfast). Quantity:  90 Tab Refills:  2  
     
   
   
   
  
 aspirin 81 mg chewable tablet Your last dose was: Your next dose is:    
   
   
 Dose:  81 mg Take 1 Tab by mouth daily (with breakfast). Quantity:  90 Tab Refills:  3  
     
   
   
   
  
 atorvastatin 40 mg tablet Commonly known as:  LIPITOR Your last dose was: Your next dose is:    
   
   
 Dose:  40 mg Take 1 Tab by mouth nightly. Indications: DYSLIPIDEMIA Quantity:  90 Tab Refills:  3 cholecalciferol 1,000 unit tablet Commonly known as:  VITAMIN D3 Your last dose was: Your next dose is:    
   
   
 Dose:  1000 Units Take 1 Tab by mouth daily. Quantity:  90 Tab Refills:  3  
     
   
   
   
  
 cyanocobalamin 1,000 mcg tablet Commonly known as:  VITAMIN B-12 Your last dose was: Your next dose is:    
   
   
 Dose:  1000 mcg Take 1 Tab by mouth daily. Quantity:  90 Tab Refills:  3  
     
   
   
   
  
 ferrous sulfate 325 mg (65 mg iron) tablet Your last dose was: Your next dose is:    
   
   
 Dose:  325 mg Take 1 Tab by mouth daily (with breakfast). Quantity:  90 Tab Refills:  3  
     
   
   
   
  
 furosemide 20 mg tablet Commonly known as:  LASIX Your last dose was: Your next dose is:    
   
   
 1 tablet daily  Indications: Pulmonary Edema due to Chronic Heart Failure Quantity:  15 Tab Refills:  0  
     
   
   
   
  
 gabapentin 300 mg capsule Commonly known as:  NEURONTIN Your last dose was: Your next dose is:    
   
   
 Dose:  300 mg Take 1 Cap by mouth every eight (8) hours. Indications: NEUROPATHIC PAIN Quantity:  45 Cap Refills:  0  
     
   
   
   
  
 magnesium oxide 400 mg tablet Commonly known as:  MAG-OX Your last dose was: Your next dose is: Dose:  400 mg Take 1 Tab by mouth daily. Quantity:  15 Tab Refills:  0  
     
   
   
   
  
 metoprolol tartrate 25 mg tablet Commonly known as:  LOPRESSOR Your last dose was: Your next dose is:    
   
   
 Dose:  25 mg Take 1 Tab by mouth every twelve (12) hours. Indications: hypertension, VENTRICULAR RATE CONTROL IN ATRIAL FIBRILLATION Quantity:  30 Tab Refills:  6  
     
   
   
   
  
 zinc sulfate 220 (50) mg capsule Commonly known as:  ZINCATE Your last dose was: Your next dose is:    
   
   
 Dose:  220 mg Take 1 Cap by mouth daily. Quantity:  90 Cap Refills:  0 STOP taking these medications   
 dilTIAZem 30 mg tablet Commonly known as:  CARDIZEM  
   
  
 DULoxetine 60 mg capsule Commonly known as:  CYMBALTA Where to Get Your Medications These medications were sent to [address] [facility], [address] [facility]  [address] [address] Hours:  24-hours Phone:  [phone]  
  furosemide 20 mg tablet  
 gabapentin 300 mg capsule  
 losartan 25 mg tablet  
 potassium chloride 20 mEq packet  
 warfarin 5 mg tablet Information on where to get these meds will be given to you by the nurse or doctor. ! Ask your nurse or doctor about these medications  
  ampicillin-sulbactam 3 gram 3 g IVPB  
 oxyCODONE-acetaminophen 5-325 mg per tablet Discharge Instructions DISCHARGE SUMMARY from Nurse The following personal items are in your possession at time of discharge: 
 
Dental Appliances: Uppers, Lowers Visual Aid: None Home Medications: None Jewelry: None Clothing: None Other Valuables: Cell Phone PATIENT INSTRUCTIONS: 
 
After general anesthesia or intravenous sedation, for 24 hours or while taking prescription Narcotics: · Limit your activities · Do not drive and operate hazardous machinery · Do not make important personal or business decisions · Do  not drink alcoholic beverages · If you have not urinated within 8 hours after discharge, please contact your surgeon on call. Report the following to your surgeon: 
· Excessive pain, swelling, redness or odor of or around the surgical area · Temperature over 100.5 · Nausea and vomiting lasting longer than 4 hours or if unable to take medications · Any signs of decreased circulation or nerve impairment to extremity: change in color, persistent  numbness, tingling, coldness or increase pain · Any questions What to do at Home: *  Please give a list of your current medications to your Primary Care Provider. *  Please update this list whenever your medications are discontinued, doses are 
    changed, or new medications (including over-the-counter products) are added. *  Please carry medication information at all times in case of emergency situations. These are general instructions for a healthy lifestyle: No smoking/ No tobacco products/ Avoid exposure to second hand smoke Surgeon General's Warning:  Quitting smoking now greatly reduces serious risk to your health. Obesity, smoking, and sedentary lifestyle greatly increases your risk for illness A healthy diet, regular physical exercise & weight monitoring are important for maintaining a healthy lifestyle You may be retaining fluid if you have a history of heart failure or if you experience any of the following symptoms:  Weight gain of 3 pounds or more overnight or 5 pounds in a week, increased swelling in our hands or feet or shortness of breath while lying flat in bed. Please call your doctor as soon as you notice any of these symptoms; do not wait until your next office visit. Recognize signs and symptoms of STROKE: 
 
F-face looks uneven A-arms unable to move or move unevenly S-speech slurred or non-existent T-time-call 911 as soon as signs and symptoms begin-DO NOT go Back to bed or wait to see if you get better-TIME IS BRAIN. Warning Signs of HEART ATTACK Call 911 if you have these symptoms: 
? Chest discomfort. Most heart attacks involve discomfort in the center of the chest that lasts more than a few minutes, or that goes away and comes back. It can feel like uncomfortable pressure, squeezing, fullness, or pain. ? Discomfort in other areas of the upper body. Symptoms can include pain or discomfort in one or both arms, the back, neck, jaw, or stomach. ? Shortness of breath with or without chest discomfort. ? Other signs may include breaking out in a cold sweat, nausea, or lightheadedness. Don't wait more than five minutes to call [address]! Fast action can save your life. Calling 911 is almost always the fastest way to get lifesaving treatment. Emergency Medical Services staff can begin treatment when they arrive  up to an hour sooner than if someone gets to the hospital by car. The discharge information has been reviewed with the patient and caregiver. The patient and caregiver verbalized understanding. Discharge medications reviewed with the patient and caregiver and appropriate educational materials and side effects teaching were provided. Patient armband removed and shredded MyChart Activation Thank you for requesting access to Info[employer]asis. Please follow the instructions below to securely access and download your online medical record. InfoBasis allows you to send messages to your doctor, view your test results, renew your prescriptions, schedule appointments, and more. How Do I Sign Up? 1. In your internet browser, go to [email or web address] 
2. Click on the First Time User? Click Here link in the Sign In box. You will be redirect to the New Member Sign Up page. 3. Enter your InfoBasis Access Code exactly as it appears below.  You will not need to use this code after youve completed the sign-up process. If you do not sign up before the expiration date, you must request a new code. [email or web address] Access Code: Activation code not generated Current ShelfFlip Status: Patient Declined (This is the date your [employer] access code will ) 4. Enter the last four digits of your Social Security Number (xxxx) and Date of Birth (mm/dd/yyyy) as indicated and click Submit. You will be taken to the next sign-up page. 5. Create a [employer] ID. This will be your ShelfFlip login ID and cannot be changed, so think of one that is secure and easy to remember. 6. Create a [employer] password. You can change your password at any time. 7. Enter your Password Reset Question and Answer. This can be used at a later time if you forget your password. 8. Enter your e-mail address. You will receive e-mail notification when new information is available in [address] 9. Click Sign Up. You can now view and download portions of your medical record. 10. Click the Download Summary menu link to download a portable copy of your medical information. Additional Information If you have questions, please visit the Frequently Asked Questions section of the Shelf[employer]ip website at [email or web address] Remember, ShelfFlip is NOT to be used for urgent needs. For medical emergencies, dial 911. 
 
 
 
 
 
 
 
 
------------------------------------------------------------------------------------------------------------ 
 
DISCHARGE INSTRUCTIONS 1. Make sure that when you request refills at the pharmacy that the refill requests are sent to your PCP (NOT to the prescriber at the [facility]) to avoid any delays in getting your medication refills.  The physician at the [facility] will not able to order medications or refills after discharge -- Please understand that though we would like to help, it is simply not safe for our physician to order you a medication that we cannot monitor. 2. If any of the prescribed medications require a prior authorization, contact your Primary Care Physician or specialist to EITHER complete prior authorizations and paperwork on your behalf OR prescribe an alternative medication. -- Please understand that though we would like to help, it is simply not safe for our physician to order you a medication that we cannot monitor. 3. Over-the-counter (OTC) medications will NOT be prescribed. You need to buy these medications over-the-counter. ------------------------------------------------------------------------------------------------------------------- Discharge Orders None [facility] Announcement We are excited to announce that we are making your provider's discharge notes available to you in F2G. You will see these notes when they are completed and signed by the physician that discharged you from your recent hospital stay. If you have any questions or concerns about any information you see in Citr[employer], please call the Health Information Department where you were seen or reach out to your Primary Care Provider for more information about your plan of care. General Information Please provide this summary of care documentation to your next provider. Patient Signature:  ____________________________________________________________ Date:  ____________________________________________________________  
  
Cory Love Provider Signature:  ____________________________________________________________ Date:  ____________________________________________________________

## 2017-08-25 NOTE — PROGRESS NOTES
Pt is alert and oriented. Vitals are within normal limits. Lungs are clear. Bowel sounds present in all four quadrants. Left groin incision is dry, clean and intact. Incision to left is intact no redness or swelling. Pt is stable will continue to monitor. Call bell within reach.

## 2017-08-25 NOTE — PROGRESS NOTES
Wound vac drsg changed. Pt tolerated well. Mepilex drsgs changed to left stump. Incision intact with little drainage. Pt is stable.

## 2017-08-26 LAB
25(OH)D3 SERPL-MCNC: 31.1 NG/ML (ref 30–100)
ANION GAP SERPL CALC-SCNC: 8 MMOL/L (ref 3–18)
BASOPHILS # BLD: 0 K/UL (ref 0–0.06)
BASOPHILS NFR BLD: 0 % (ref 0–2)
BUN SERPL-MCNC: 8 MG/DL (ref 7–18)
BUN/CREAT SERPL: 13 (ref 12–20)
CALCIUM SERPL-MCNC: 9 MG/DL (ref 8.5–10.1)
CHLORIDE SERPL-SCNC: 99 MMOL/L (ref 100–108)
CO2 SERPL-SCNC: 26 MMOL/L (ref 21–32)
CREAT SERPL-MCNC: 0.62 MG/DL (ref 0.6–1.3)
DIFFERENTIAL METHOD BLD: ABNORMAL
EOSINOPHIL # BLD: 0.1 K/UL (ref 0–0.4)
EOSINOPHIL NFR BLD: 0 % (ref 0–5)
ERYTHROCYTE [DISTWIDTH] IN BLOOD BY AUTOMATED COUNT: 18.3 % (ref 11.6–14.5)
FERRITIN SERPL-MCNC: 1066 NG/ML (ref 8–388)
GLUCOSE SERPL-MCNC: 96 MG/DL (ref 74–99)
HCT VFR BLD AUTO: 26.5 % (ref 36–48)
HGB BLD-MCNC: 8.6 G/DL (ref 13–16)
INR PPP: 1.2 (ref 0.8–1.2)
LYMPHOCYTES # BLD: 1.9 K/UL (ref 0.9–3.6)
LYMPHOCYTES NFR BLD: 14 % (ref 21–52)
MAGNESIUM SERPL-MCNC: 1.9 MG/DL (ref 1.6–2.6)
MCH RBC QN AUTO: 28.6 PG (ref 24–34)
MCHC RBC AUTO-ENTMCNC: 32.5 G/DL (ref 31–37)
MCV RBC AUTO: 88 FL (ref 74–97)
MONOCYTES # BLD: 1.1 K/UL (ref 0.05–1.2)
MONOCYTES NFR BLD: 8 % (ref 3–10)
NEUTS SEG # BLD: 10.5 K/UL (ref 1.8–8)
NEUTS SEG NFR BLD: 78 % (ref 40–73)
PLATELET # BLD AUTO: 698 K/UL (ref 135–420)
PMV BLD AUTO: 9 FL (ref 9.2–11.8)
POTASSIUM SERPL-SCNC: 3.9 MMOL/L (ref 3.5–5.5)
PROTHROMBIN TIME: 14.4 SEC (ref 11.5–15.2)
RBC # BLD AUTO: 3.01 M/UL (ref 4.7–5.5)
RETICS/RBC NFR AUTO: 4.7 % (ref 0.5–2.3)
SODIUM SERPL-SCNC: 133 MMOL/L (ref 136–145)
WBC # BLD AUTO: 13.6 K/UL (ref 4.6–13.2)

## 2017-08-26 PROCEDURE — 85025 COMPLETE CBC W/AUTO DIFF WBC: CPT | Performed by: INTERNAL MEDICINE

## 2017-08-26 PROCEDURE — 97165 OT EVAL LOW COMPLEX 30 MIN: CPT

## 2017-08-26 PROCEDURE — 82728 ASSAY OF FERRITIN: CPT | Performed by: INTERNAL MEDICINE

## 2017-08-26 PROCEDURE — 97535 SELF CARE MNGMENT TRAINING: CPT

## 2017-08-26 PROCEDURE — 97162 PT EVAL MOD COMPLEX 30 MIN: CPT

## 2017-08-26 PROCEDURE — 74011250636 HC RX REV CODE- 250/636: Performed by: INTERNAL MEDICINE

## 2017-08-26 PROCEDURE — 82306 VITAMIN D 25 HYDROXY: CPT | Performed by: INTERNAL MEDICINE

## 2017-08-26 PROCEDURE — 85610 PROTHROMBIN TIME: CPT | Performed by: INTERNAL MEDICINE

## 2017-08-26 PROCEDURE — 65310000000 HC RM PRIVATE REHAB

## 2017-08-26 PROCEDURE — 80048 BASIC METABOLIC PNL TOTAL CA: CPT | Performed by: INTERNAL MEDICINE

## 2017-08-26 PROCEDURE — 85045 AUTOMATED RETICULOCYTE COUNT: CPT | Performed by: INTERNAL MEDICINE

## 2017-08-26 PROCEDURE — 97116 GAIT TRAINING THERAPY: CPT

## 2017-08-26 PROCEDURE — 74011000258 HC RX REV CODE- 258: Performed by: INTERNAL MEDICINE

## 2017-08-26 PROCEDURE — 83735 ASSAY OF MAGNESIUM: CPT | Performed by: INTERNAL MEDICINE

## 2017-08-26 PROCEDURE — 77030011256 HC DRSG MEPILEX <16IN NO BORD MOLN -A

## 2017-08-26 PROCEDURE — 97110 THERAPEUTIC EXERCISES: CPT

## 2017-08-26 PROCEDURE — 74011250637 HC RX REV CODE- 250/637: Performed by: INTERNAL MEDICINE

## 2017-08-26 RX ORDER — LOSARTAN POTASSIUM 50 MG/1
100 TABLET ORAL DAILY
Status: DISCONTINUED | OUTPATIENT
Start: 2017-08-27 | End: 2017-09-06

## 2017-08-26 RX ORDER — LOSARTAN POTASSIUM 50 MG/1
50 TABLET ORAL ONCE
Status: COMPLETED | OUTPATIENT
Start: 2017-08-26 | End: 2017-08-26

## 2017-08-26 RX ORDER — WARFARIN 7.5 MG/1
7.5 TABLET ORAL ONCE
Status: COMPLETED | OUTPATIENT
Start: 2017-08-26 | End: 2017-08-26

## 2017-08-26 RX ADMIN — WARFARIN SODIUM 7.5 MG: 7.5 TABLET ORAL at 17:45

## 2017-08-26 RX ADMIN — GABAPENTIN 300 MG: 300 CAPSULE ORAL at 13:56

## 2017-08-26 RX ADMIN — AMPICILLIN AND SULBACTAM 3 G: 1; 2 INJECTION, POWDER, FOR SOLUTION INTRAMUSCULAR; INTRAVENOUS at 16:03

## 2017-08-26 RX ADMIN — AMPICILLIN AND SULBACTAM 3 G: 1; 2 INJECTION, POWDER, FOR SOLUTION INTRAMUSCULAR; INTRAVENOUS at 21:22

## 2017-08-26 RX ADMIN — POTASSIUM CHLORIDE 20 MEQ: 1.5 POWDER, FOR SOLUTION ORAL at 09:23

## 2017-08-26 RX ADMIN — ASPIRIN 81 MG 81 MG: 81 TABLET ORAL at 09:23

## 2017-08-26 RX ADMIN — LOSARTAN POTASSIUM 50 MG: 50 TABLET ORAL at 06:31

## 2017-08-26 RX ADMIN — POLYETHYLENE GLYCOL 3350 17 G: 17 POWDER, FOR SOLUTION ORAL at 18:04

## 2017-08-26 RX ADMIN — DOCUSATE SODIUM 100 MG: 100 CAPSULE, LIQUID FILLED ORAL at 17:45

## 2017-08-26 RX ADMIN — HEPARIN SODIUM 5000 UNITS: 5000 INJECTION, SOLUTION INTRAVENOUS; SUBCUTANEOUS at 06:30

## 2017-08-26 RX ADMIN — METOPROLOL TARTRATE 25 MG: 25 TABLET, FILM COATED ORAL at 09:23

## 2017-08-26 RX ADMIN — GABAPENTIN 300 MG: 300 CAPSULE ORAL at 21:22

## 2017-08-26 RX ADMIN — HYDROMORPHONE HYDROCHLORIDE 2 MG: 2 TABLET ORAL at 20:26

## 2017-08-26 RX ADMIN — AMPICILLIN AND SULBACTAM 3 G: 1; 2 INJECTION, POWDER, FOR SOLUTION INTRAMUSCULAR; INTRAVENOUS at 09:24

## 2017-08-26 RX ADMIN — CYANOCOBALAMIN TAB 1000 MCG 1000 MCG: 1000 TAB at 09:22

## 2017-08-26 RX ADMIN — VITAMIN D, TAB 1000IU (100/BT) 2000 UNITS: 25 TAB at 09:23

## 2017-08-26 RX ADMIN — DOCUSATE SODIUM 100 MG: 100 CAPSULE, LIQUID FILLED ORAL at 09:22

## 2017-08-26 RX ADMIN — AMPICILLIN AND SULBACTAM 3 G: 1; 2 INJECTION, POWDER, FOR SOLUTION INTRAMUSCULAR; INTRAVENOUS at 04:24

## 2017-08-26 RX ADMIN — ZINC SULFATE 220 MG (50 MG) CAPSULE 220 MG: CAPSULE at 09:22

## 2017-08-26 RX ADMIN — GABAPENTIN 300 MG: 300 CAPSULE ORAL at 06:31

## 2017-08-26 RX ADMIN — Medication 250 MG: at 09:22

## 2017-08-26 RX ADMIN — LOSARTAN POTASSIUM 50 MG: 50 TABLET ORAL at 13:56

## 2017-08-26 RX ADMIN — HYDROMORPHONE HYDROCHLORIDE 4 MG: 2 TABLET ORAL at 13:58

## 2017-08-26 RX ADMIN — ATORVASTATIN CALCIUM 40 MG: 40 TABLET, FILM COATED ORAL at 20:27

## 2017-08-26 RX ADMIN — Medication 400 MG: at 09:22

## 2017-08-26 RX ADMIN — Medication 1 CAPSULE: at 09:22

## 2017-08-26 RX ADMIN — HEPARIN SODIUM 5000 UNITS: 5000 INJECTION, SOLUTION INTRAVENOUS; SUBCUTANEOUS at 21:24

## 2017-08-26 RX ADMIN — Medication 325 MG: at 09:23

## 2017-08-26 RX ADMIN — AMIODARONE HYDROCHLORIDE 200 MG: 200 TABLET ORAL at 09:22

## 2017-08-26 RX ADMIN — METOPROLOL TARTRATE 25 MG: 25 TABLET, FILM COATED ORAL at 20:27

## 2017-08-26 RX ADMIN — HEPARIN SODIUM 5000 UNITS: 5000 INJECTION, SOLUTION INTRAVENOUS; SUBCUTANEOUS at 13:55

## 2017-08-26 RX ADMIN — FUROSEMIDE 20 MG: 20 TABLET ORAL at 09:23

## 2017-08-26 RX ADMIN — HYDROMORPHONE HYDROCHLORIDE 4 MG: 2 TABLET ORAL at 06:31

## 2017-08-26 NOTE — PROGRESS NOTES
conducted an initial consultation and Spiritual Assessment for Eduardo Saavedra, who is a 59 y.o.,male. Patients Primary Language is: [place]. According to the patients EMR Muslim Affiliation is: No preference.      The reason the Patient came to the hospital is:   Patient Active Problem List    Diagnosis Date Noted    Skin ulcer of malleolar area of right ankle ([address]     Prolonged Q-T interval on ECG 08/19/2017    Adverse effect of amiodarone 08/19/2017    Status post above knee amputation of left lower extremity ([address] 08/18/2017    Aftercare for amputation stump 08/18/2017    Ischemic rest pain of lower extremity ([address] 08/14/2017    Chronic ulcer of right heel ([address] 08/08/2017    Chronic anemia     Acute blood loss as cause of postoperative anemia 07/25/2017    Aftercare following surgery of the circulatory system 07/25/2017    Impaired mobility and ADLs 07/24/2017    Infection of vascular bypass graft ([address] 07/24/2017    Moderate to severe pulmonary hypertension ([address] 07/23/2017    Pseudoaneurysm of femoral artery ([address] 06/27/2017    History of vitamin D deficiency 04/22/2017    Status post femoral-popliteal bypass surgery 04/21/2017    Anticoagulated on Coumadin     Ischemic cardiomyopathy     Chronic systolic heart failure (HCC)     Carotid artery disease ([address]     Dyslipidemia     History of cardioversion 04/18/2017    Paroxysmal atrial fibrillation ([address]     Critical ischemia of lower extremity 04/10/2017    Euthyroid sick syndrome 04/06/2017    Coronary artery disease involving native coronary artery of native heart 03/15/2017    Aortoiliac occlusive disease ([address] 01/25/2017    Atherosclerosis of native artery of both lower extremities with intermittent claudication ([address] 01/25/2017    Peripheral artery disease ([address] 01/25/2017    Hereditary peripheral neuropathy 11/15/2016    Erectile dysfunction 07/05/2016    Spinal stenosis of lumbar region with radiculopathy 05/04/2015    Benign hypertensive heart disease with systolic congestive heart failure, NYHA class 2 ([facility] 01/26/2015    DDD (degenerative disc disease), lumbar 01/26/2015        The  provided the following Interventions:  Initiated a relationship of care and support. Explored issues of dl, belief, spirituality and Mandaeism/ritual needs while hospitalized. Listened empathically. Provided chaplaincy education. Provided information about Spiritual Care Services. Offered prayer and assurance of continued prayers on patient's behalf. Chart reviewed. The following outcomes were achieved:  Patient shared limited information about both their medical narrative and spiritual journey/beliefs. Patient processed feeling about current hospitalization. Patient expressed gratitude for the 's visit. Assessment:  Patient does not have any Mandaeism/cultural needs that will affect patients preferences in health care. Plan:  Chaplains will continue to follow and will provide pastoral care on an as needed/requested basis.  recommends bedside caregivers page  on duty if patient shows signs of acute spiritual or emotional distress.      Jacob Barajas

## 2017-08-26 NOTE — PROGRESS NOTES
Problem: Self Care Deficits Care Plan (Adult)  Goal: *Therapy Goal (Edit Goal, Insert Text)  Long Term Goals (to be met upon discharge date) in order to increase pts functional independence and safety, and decrease burden of care:  1. Pt will perform self-feeding with independence. 2. Pt will perform grooming with independent. 3. Pt will perform UB bathing with independence. 4. Pt will perform LB bathing with Mod I.  5. Pt will perform tub/shower transfer with Mod I.   6. Pt will perform UB dressing with independence. 7. Pt will perform LB dressing with Mod Iindependence. 8. Pt will perform toileting task with Mod independence. 9. Pt will perform toilet transfer with Mod I. Short Term Weekly Goals for (8/26/17 to 9/2/17) in order to increase pts functional independence and safety, and decrease burden of care:  3. Pt will perform UB bathing with independence. 4. Pt will perform LB bathing with Supervision. 5. Pt will perform tub/shower transfer with supervision. 6. Pt will perform UB dressing with Supervision. 7. Pt will perform LB dressing with Supervision. 8. Pt will perform toileting task with Supervision. 9. Pt will perform toilet transfer with Supervision. OCCUPATIONAL THERAPY EXAMINATION     Patient Name: Dorothea Allen  Patient Age: 59 y.o.      Time In: 7609  Time Out: 0800     Past Medical History:   Past Medical History:   Diagnosis Date    Acute blood loss as cause of postoperative anemia 4/4/2017    Anticoagulated on Coumadin      Aortoiliac occlusive disease ([address] 1/25/2017    Atherosclerosis of native artery of both lower extremities with intermittent claudication ([address] 1/25/2017    Benign hypertensive heart disease with systolic congestive heart failure, NYHA class 2 ([address] 1/26/2015    Carotid artery disease (HCC)      Chronic anemia      Chronic systolic heart failure (HCC)      Chronic ulcer of right foot ([address] 4/4/2017    Chronic ulcer of right heel ([address] 8/8/2017    Coronary artery disease involving native coronary artery of native heart 3/15/2017     Successful stenting of Cx (Xience LESLEY) and RCA (Xience LESLEY) to 0% by Dr. Lien Verma on 3/15/17.     DDD (degenerative disc disease), lumbar 1/26/2015    Dyslipidemia      Erectile dysfunction 7/5/2016    Euthyroid sick syndrome 4/6/2017    Hereditary peripheral neuropathy 11/15/2016    History of cardioversion 4/18/2017     S/P Synchronized external cardioversion (4/18/2017 - Dr. Asia Delcid)    History of vitamin D deficiency 4/22/2017     Vitamin D 25-Hydroxy (4/22/2017) = 12.0; Vitamin D 25-Hydroxy (5/26/2017) = 38.7    Infection of vascular bypass graft ([facility] 7/24/2017     Left lower extremity    Ischemic cardiomyopathy      Moderate to severe pulmonary hypertension ([address] 7/23/2017    Paroxysmal atrial fibrillation ([address]      Peripheral artery disease ([address] 1/25/2017    Skin ulcer of malleolar area of right ankle ([address]      Spinal stenosis of lumbar region with radiculopathy 5/4/2015     Dr. Nadine Soares Diagnosis:  Left AKA  Status post above knee amputation of left lower extremity (HCC) Status post above knee amputation of left lower extremity (HCC)   Therapy Diagnosis:   Difficulty with ADLs  [X]     Difficulty with functional transfers  [X]     Difficulty with ambulation  [X]     Difficulty with IADLs  [X]        Problem List:    Decreased strength B UE  [X]     Decreased strength trunk/core  [X]     Decreased AROM   [ ]     Decreased endurance  [ ]     Decreased balance sitting  [X]     Decreased balance standing  [X]     Pain   [X]     Decreased PROM  [ ]        Functional Limitations:   Decreased independence with ADL  [ ]     Decreased independence with functional transfers  [ ]     Decreased independence with ambulation  [ ]     Decreased independence with IADL  [ ]        Previous Functional Level: Pre-Morbid Level of Function: independent     Home Environment: [address] [place] [address] Environment: Apartment  # Steps to Enter: 10  One/Two Story Residence: Two story  Interior Rails: Right  Lift Chair Available: Yes  Living Alone: No  Support Systems: Child(mile)  Patient Expects to be Discharged to[de-identified] Apartment  Current DME Used/Available at Home: Wheelchair  Tub or Shower Type: Tub/Shower combination     Precautions: fall, NWB LLE     Pain at start of tx:5  Pain at stop of tx:5     Patient identified with name and : yes     Barriers to Learning/Limitations: None  Compensate with: visual, verbal, tactile, kinesthetic cues/model     Objective:        Outcome Measures:                  MMT Initial Assessment    Right Upper Extremity  Left Upper Extremity    UE AROM  WDL  WDL   Shoulder flexion  4/5 all joints  4/5 all joints   Shoulder extension       Shoulder ABDuction       Shoulder ADDUction       Elbow Flexion       Elbow Extension       Wrist Extension/Flexion              0/5       No palpable muscle contraction  1/5       Palpable muscle contraction, no joint movement  2-/5      Less than full range of motion in gravity eliminated position  2/5       Able to complete full range of motion in gravity eliminated position  2+/5     Able to initiate movement against gravity  3-/5      More than half but not full range of motion against gravity  3/5       Able to complete full range of motion against gravity  3+/5     Completes full range of motion against gravity with minimal resistance  4-/5      Completes full range of motion against gravity with minimal-moderate resistance  4/5       Completes full range of motion against gravity with moderate resistance  4+/5     Completes full range of motion against gravity with moderate-maximum resistance  5/5       Completes full range of motion against gravity with maximum resistance     Sensation: intact  Coordination: intact      FIM SCORES Initial Assessment   Bladder - level of assist 4   Bladder - accident frequency score 6   Bowel - level of assist 3   Bowel - accident frequency score 4   Please see C Interdisciplinary Eval: Coordination/Balance Section for details regarding FIM score description.       COGNITION/PERCEPTION Initial Assessment   Premorbid Reading Status Literate   Premorbid Writing Status     Arousal/Alertness Generalized responses   Orientation Level Oriented X4   Visual Fields     Praxis     Body Scheme         COMPREHENSION MODE Initial Assessment   Primary Mode of Comprehension Auditory   Hearing Aide     Corrective Lenses     Score 6       EXPRESSION Initial Assessment   Primary Mode of Expression Verbal   Score 6   Comments         SOCIAL INTERACTION/PRAGMATICS Initial Assessment   Score 5   Comments         PROBLEM SOLVING Initial Assessment   Score 4   Comments         MEMORY Initial Assessment   Score 4   Comments         EATING Initial Assessment   Functional Level 6   Comments pt has dentures, independent with self feeding       GROOMING Initial Assessment   Functional Level 7     Oral Hygiene FIM:7   Tasks completed by patient Brushed hair, Brushed teeth, Washed face, Washed hands   Comments pt completed grooming w/c levl at sink independently       BATHING Initial Assessment   Functional Level 4   Body parts patient bathed Abdomen, Arm, left, Arm, right, Chest, Lower leg and foot, right, Joanie area, Thigh, left, Thigh, right   Comments pt seated on edge of bed for bathing, pt used BUes to bathe all areas of body with assist to wash buttocks in standing       TUB/SHOWER TRANSFER INDEPENDENCE Initial Assessment   Score 4   Comments min assist stand pivot to tub bench stand pivot transfer from w/c to tu       UPPER BODY DRESSING/UNDRESSING Initial Assessment   Functional Level 7   Items applied/Steps completed Pullover (4 steps)   Comments         LOWER BODY DRESSING/UNDRESSING Initial Assessment   Functional Level 4     Sock and/or Shoe Management FIM:4   Items applied/Steps completed Elastic waist pants (3 steps), Sock, right (1 step)   Comments pt donned pants seated on edge of bed, assist to thread wound vac tube through pant leg, CGA to pullup pants, pt able to don R sock with supervision pulling leg up onto bed       TOILETING Initial Assessment   Functional Level 3   Comments min assist to pull up clothing due to decreased balance       TOILET TRANSFER INDEPENDENCE Initial Assessment   Transfer score 4   Comments min assist stand pivot transfer from w/c to commode with grab bars or RW, assist due to decreased balance and decreased confidence with transfers       INSTRUMENTAL ADL Initial Assessment (PLOF)   Meal preparation Maximum assistance   Homemaking     Medicine Management     Financial Management        OCCUPATIONAL THERAPY PLAN OF CARE  Areas to Assess:   Self Care/Functional transfer/IADL  [X]     NMRE/ estim  [ ]     UE Ther ex/Ther act  [X]     Cognitive Training  [ ]     Patient/Family/Caregiver Education  [X]       Order received from MD for occupational therapy services and chart reviewed. Pt to be seen 5 times per week for 3 hours of total therapy per day for 2 weeks. Thank you for the referral.     LTGs: See Care Plan     Pt would benefit from skilled occupational therapy in order to improve independent functional mobility/ADLs,/IADLs within the home. Interventions may include range of motion (AROM, PROM B UE), motor function (B UE/ strengthening/coordination), activity tolerance (vitals, oxygen saturation levels), balance training, ADL/IADL training and functional transfer training. Please see IRC; Interdisciplinary Eval, Care Plan, and Patient Education for further information regarding occupational therapy examination and plan of care.       Heath Hart OT  8/26/2017

## 2017-08-26 NOTE — REHAB NOTE
[facility]    Bedside and Verbal shift change report given to [address] (oncoming nurse) by Denver Johnson (offgoing nurse). Report included the following information SBAR, Kardex, MAR and Recent Results. Situation:   Code Status: Full Code   Reason for Admission: Left AKA  Hospital Day: 1   Problem List:   Hospital Problems  Date Reviewed: 8/18/2017          Codes Class Noted POA    Skin ulcer of malleolar area of right ankle ([facility] (Chronic) ICD-10-CM: L97.319  ICD-9-CM: 707.13  Unknown Yes        * (Principal)Status post above knee amputation of left lower extremity ([facility] [address] ICD-10-CM: F48.032  ICD-9-CM: V49.76  8/18/2017 Yes    Overview Signed 8/21/2017 10:21 PM by Ouida Barthel, MD     S/P Left above-the-knee amputation (8/18/2017 - Dr. Jovan Lama)               Aftercare for amputation stump ICD-10-CM: Z47.81  ICD-9-CM: V54.89  8/18/2017 Yes    Overview Signed 8/21/2017 10:25 PM by Ouida Barthel, MD     S/P Left above-the-knee amputation (8/18/2017 - Dr. Jovan Lama)             Ischemic rest pain of lower extremity ([facility] ICD-10-CM: I73.9  ICD-9-CM: 443.9  8/14/2017 Yes    Overview Signed 8/15/2017 11:09 AM by Ouida Barthel, MD     Left             Chronic ulcer of right heel ([facility] (Chronic) ICD-10-CM: L97.419  ICD-9-CM: 707.14  8/8/2017 Yes        Acute blood loss as cause of postoperative anemia ICD-10-CM: D62  ICD-9-CM: 285.1  7/25/2017 Yes        Aftercare following surgery of the circulatory system ICD-10-CM: Z48.812  ICD-9-CM: V58.73  7/25/2017 Yes    Overview Addendum 8/21/2017 10:22 PM by Ouida Barthel, MD     S/P Removal of infected graft; Repair of left superficial femoral artery; Repair of left common femoral artery (7/25/2017 - Dr. Jovan Lama); S/P Right axillary femoral jump bypass interposition; Removal of infected right axillary femoral bypass;  Wound VAC placement of upper thigh 1.2 cm x 5.2 cm x 1.8 cm and groin 4 cm x 0.5 cm x 0.2 cm (8/18/2017 - Dr. Ana M Suazo)             Impaired mobility and ADLs ICD-10-CM: Z74.09  ICD-9-CM: 799.89  7/24/2017 Yes        Infection of vascular bypass graft [facility]) ICD-10-CM: T82. 7XXA  ICD-9-CM: 996.62  7/24/2017 Yes    Overview Signed 8/7/2017  2:19 PM by Joyce Lagunas MD     Left lower extremity             Anticoagulated on Coumadin (Chronic) ICD-10-CM: Z51.81, Z79.01  ICD-9-CM: V58.83, V58.61  Unknown Yes        Paroxysmal atrial fibrillation (HCC) ICD-10-CM: I48.0  ICD-9-CM: 427.31  Unknown Yes        Peripheral artery disease ([facility] (Chronic) ICD-10-CM: I73.9  ICD-9-CM: 443.9  1/25/2017 Yes        Benign hypertensive heart disease with systolic congestive heart failure, NYHA class 2 (HCC) (Chronic) ICD-10-CM: I11.0, I50.20  ICD-9-CM: 402.11, 428.20, 428.0  1/26/2015 Yes              Background:   Past Medical History:   Past Medical History:   Diagnosis Date    Acute blood loss as cause of postoperative anemia 4/4/2017    Anticoagulated on Coumadin     Aortoiliac occlusive disease ([address] 1/25/2017    Atherosclerosis of native artery of both lower extremities with intermittent claudication ([address] 1/25/2017    Benign hypertensive heart disease with systolic congestive heart failure, NYHA class 2 ([address] 1/26/2015    Carotid artery disease (HCC)     Chronic anemia     Chronic systolic heart failure (HCC)     Chronic ulcer of right foot ([address] 4/4/2017    Chronic ulcer of right heel ([address] 8/8/2017    Coronary artery disease involving native coronary artery of native heart 3/15/2017    Successful stenting of Cx (Xience LESLEY) and RCA (Xience LESLEY) to 0% by Dr. Lien Verma on 3/15/17.     DDD (degenerative disc disease), lumbar 1/26/2015    Dyslipidemia     Erectile dysfunction 7/5/2016    Euthyroid sick syndrome 4/6/2017    Hereditary peripheral neuropathy 11/15/2016    History of cardioversion 4/18/2017    S/P Synchronized external cardioversion (4/18/2017 - Dr. Asia Delcid)    History of vitamin D deficiency 4/22/2017    Vitamin D 25-Hydroxy (4/22/2017) = 12.0; Vitamin D 25-Hydroxy (5/26/2017) = 38.7    Infection of vascular bypass graft ([facility] 7/24/2017    Left lower extremity    Ischemic cardiomyopathy     Moderate to severe pulmonary hypertension ([facility] 7/23/2017    Paroxysmal atrial fibrillation (HCC)     Peripheral artery disease ([facility] 1/25/2017    Skin ulcer of malleolar area of right ankle ([facility]     Spinal stenosis of lumbar region with radiculopathy 5/4/2015    Dr. Brina Monroe      Patient taking anticoagulants Heparin   Patient has a defibrillator: no    Assessment:   Changes in Assessment throughout shift:no change in assessment    Patient has central line:yes Reasons if yes: long term antibiotic therapy Insertion date:8/24/17 Last dressing date:8/24/17   Patient has Jiang Cath:no Reasons if yes:na  Insertion date:na     Last Vitals:     Vitals:    08/25/17 1330 08/25/17 1535 08/25/17 2141   BP: 126/67 133/65 127/56   Pulse: 61 60 70   Resp: 18 18 17   Temp: 97.2 °F (36.2 °C) 96.7 °F (35.9 °C) 98.3 °F (36.8 °C)   SpO2: 97% 98% 98%   Weight: 70.8 kg (156 lb 1.4 oz)     Height: 5' 10\" (1.778 m)          PAIN    Pain Assessment    Pain Intensity 1: 0 (08/26/17 0000) Pain Intensity 1: 2 (12/29/14 1105)    Pain Location 1: Leg Pain Location 1: Abdomen    Pain Intervention(s) 1: Medication (see MAR) Pain Intervention(s) 1: Medication (see MAR)  Patient Stated Pain Goal: 0 Patient Stated Pain Goal: 0  o Intervention effective: yes  o Other actions taken for pain: repositioned     Skin Assessment  Skin color Skin Color: Appropriate for ethnicity  Condition/Temperature Skin Condition/Temp: Dry, Warm  Integrity Skin Integrity: Incision (comment)  Turgor Turgor: Non-tenting  Weekly Pressure Ulcer Documentation  Pressure  Injury Documentation: No Pressure Injury Noted-Pressure Ulcer Prevention Initiated  Wound Prevention & Protection Methods  Orientation of wound Orientation of Wound Prevention: Posterior  Location of Prevention Location of Wound Prevention: Buttocks, Sacrum/Coccyx  Dressing Present Dressing Present : No  Dressing Status    Wound Offloading Wound Offloading (Prevention Methods): Bed, pressure redistribution/air     INTAKE/OUPUT    Date 08/25/17 0700 - 08/26/17 0659 08/26/17 0700 - 08/27/17 0659   Shift 9168-0835 3576-2054 24 Hour Total 0700-1859 1900-0659 24 Hour Total   I  N  T  A  K  E   Shift Total  (mL/kg)         O  U  T  P  U  T   Urine  (mL/kg/hr)  825 825         Urine Voided  825 825         Urine Occurrence(s) 0 x 6 x 6 x       Stool            Stool Occurrence(s) 0 x 0 x 0 x       Shift Total  (mL/kg)  825  (11.7) 825  (11.7)      NET  -825 -825      Weight (kg) 70.8 70.8 70.8 70.8 70.8 70.8       Recommendations:  1. Patient needs and requests: urinal emptied ,repositioned    2. Diet: Cardiac     3. Pending tests/procedures: labs    4. Functional Level/Equipment: assist x 1 / w/c    5. Estimated Discharge Date: tbd Posted on Whiteboard in Patients Room: [facility] Safety Check    A safety check occurred in the patient's room between off going nurse and oncoming nurse listed above. The safety check included the below items  Area Items   H  High Alert Medications - Verify all high alert medication drips (heparin, PCA, etc.)   E  Equipment - Suction is set up for ALL patients (with yanker)  - Red plugs utilized for all equipment (IV pumps, etc.)  - WOWs wiped down at end of shift.  - Room stocked with oxygen, suction, and other unit-specific supplies   A  Alarms - Bed alarm is set for fall risk patients  - Ensure chair alarm is in place and activated if patient is up in a chair   L  Lines - Check IV for any infiltration  - Jiang bag is empty if patient has a Jiang   - Tubing and IV bags are labeled   S  Safety   - Room is clean, patient is clean, and equipment is clean. - Hallways are clear from equipment besides carts.    - Fall bracelet on for fall risk patients  - Ensure room is clear and free of clutter  - Suction is set up for ALL patients (with cailin)  - Hallways are clear from equipment besides carts.    - Isolation precautions followed, supplies available outside room, sign posted

## 2017-08-26 NOTE — PROGRESS NOTES
Problem: Mobility Impaired (Adult and Pediatric)  Goal: *Acute Goals and Plan of Care (Insert Text)  Physical Therapy Goals  Initiated 8/26/2017 and to be accomplished within 14 day(s)  1. Patient will move from supine to sit and sit to supine , scoot up and down and roll side to side in bed with independence. 2. Patient will transfer from bed to chair and chair to bed with modified independence using the least restrictive device. 3. Patient will perform sit to stand with modified independence. 4. Patient will ambulate with modified independence for 50 feet with the least restrictive device. 5. Patient will ascend/descend 3 stairs with 2 handrail(s) with supervision/set-up. Physical Therapy Goals  Initiated 8/26/2017 and to be accomplished within 7 day(s)  1. Patient will move from supine to sit and sit to supine , scoot up and down and roll side to side in bed with independence. 2. Patient will transfer from bed to chair and chair to bed with supervision/set-up using the least restrictive device. 3. Patient will perform sit to stand with supervision/set-up. 4. Patient will ambulate with supervision/set-up for 50 feet with the least restrictive device. 5. Patient will ascend/descend 3 stairs with 2 handrail(s) with minimal assistance/contact guard assist.  PHYSICAL THERAPY EXAMINATION     Patient Name: Elizabeth Durham  Patient Age: 59 y.o.   Past Medical History:   Past Medical History:   Diagnosis Date    Acute blood loss as cause of postoperative anemia 4/4/2017    Anticoagulated on Coumadin      Aortoiliac occlusive disease ([address] 1/25/2017    Atherosclerosis of native artery of both lower extremities with intermittent claudication ([address] 1/25/2017    Benign hypertensive heart disease with systolic congestive heart failure, NYHA class 2 ([address] 1/26/2015    Carotid artery disease (HCC)      Chronic anemia      Chronic systolic heart failure (HCC)      Chronic ulcer of right foot ([address] 4/4/2017    Chronic ulcer of right heel ([address] 2017    Coronary artery disease involving native coronary artery of native heart 3/15/2017     Successful stenting of Cx (Xience LESLEY) and RCA (Xience LESLEY) to 0% by Dr. Mihir Mccall on 3/15/17.     DDD (degenerative disc disease), lumbar 2015    Dyslipidemia      Erectile dysfunction 2016    Euthyroid sick syndrome 2017    Hereditary peripheral neuropathy 11/15/2016    History of cardioversion 2017     S/P Synchronized external cardioversion (2017 - Dr. Gelacio Silva)    History of vitamin D deficiency 2017     Vitamin D 25-Hydroxy (2017) = 12.0; Vitamin D 25-Hydroxy (2017) = 38.7    Infection of vascular bypass graft ([address] 2017     Left lower extremity    Ischemic cardiomyopathy      Moderate to severe pulmonary hypertension ([address] 2017    Paroxysmal atrial fibrillation ([address]      Peripheral artery disease ([address] 2017    Skin ulcer of malleolar area of right ankle ([address]      Spinal stenosis of lumbar region with radiculopathy 2015     Dr. Brandie Seymour        Pain at start of tx:0  Pain at stop of tx:0, pt c/o pain in LLE with dependent position during ambulation but, resolved with sitting     Patient identified with name and :yes     Medical Diagnosis:  Left AKA  Status post above knee amputation of left lower extremity ([address] Status post above knee amputation of left lower extremity ([address]          Therapy Diagnosis:   Difficulty with bed mobility  [ ]     Difficulty with functional transfers  [X]     Difficulty with ambulation  [X]     Difficulty with stair negotiations  [X]        Problem List:    Decreased strength B LE  [X]     Decreased strength trunk/core  [X]     Decreased AROM   [ ]     Decreased PROM  [ ]    Decreased endurance  [X]     Decreased balance sitting  [ ]     Decreased balance standing  [X]     Pain   [X]     Slow ambulation velocity  [X]    Decreased coordination  [ ]    Decreased safety awareness  [ ]       Functional Limitations:   Decreased independence with bed mobility  [ ]     Decreased independence with functional transfers  [X]     Decreased independence with ambulation  [X]     Decreased independence with stair negotiation  [X]        Previous Functional Level: independent     Home Environment: Home Environment: Apartment  # Steps to Enter: 10  Rails to Enter: Yes  Hand Rails : Bilateral  Wheelchair Ramp: No  One/Two Story Residence: Two story  # of Interior Steps: 12  Height of Each Step (in): 6 inches  Interior Rails: Right  Lift Chair Available: Yes  Living Alone: No  Support Systems: Child(mile)  Patient Expects to be Discharged to[de-identified] Apartment  Current DME Used/Available at Home: Wheelchair  Tub or Shower Type: Tub/Shower combination     Barriers to Learning/Limitations: yes;  physical  Compensate with: visual, verbal, tactile, kinesthetic cues/model             Outcome Measures: FIM                 MMT Initial Assessment    Right Lower Extremity Left Lower Extremity   Hip Flexion 4+ 4   Knee Extension 4+     Knee Flexion 4+     Ankle Dorsiflexion 4+     0/5       No palpable muscle contraction  1/5       Palpable muscle contraction, no joint movement  2-/5      Less than full range of motion in gravity eliminated position  2/5       Able to complete full range of motion in gravity eliminated position  2+/5     Able to initiate movement against gravity  3-/5      More than half but not full range of motion against gravity  3/5       Able to complete full range of motion against gravity  3+/5     Completes full range of motion against gravity with minimal resistance  4-/5      Completes full range of motion against gravity with minimal-moderate resistance  4/5       Completes full range of motion against gravity with moderate resistance  4+/5     Completes full range of motion against gravity with moderate-maximum resistance  5/5       Completes full range of motion against gravity with maximum resistance                 AROM: Full AROM      FIM SCORES Initial Assessment   Bed/Chair/Wheelchair Transfers Transfer Type: Other  Other: Stand-step with RW with CGA/SBA  Transfer Assistance : 4 (Contact guard assistance)  Sit to Stand Assistance: Contact guard assistance  Car Transfers: Not tested  Car Type: n/a   Wheelchair Mobility Able to Propel (ft): 250 feet  Functional Level: 6  Curbs/Ramps Assist Required (FIM Score): 6 (Modified independent)  Wheelchair Setup Assist Required : 6 (Modified independent)  Wheelchair Management: Manages right brake, Manages left brake   Walking [place] 4 (Contact guard assistance)   Steps/Stairs Level of Assist : 0 (Not tested)   PRIMARY MODE OF LOCOMOTION: both  Please see IRC Interdisciplinary Eval: Coordination/Balance Section for details regarding FIM score description. BED/CHAIR/WHEELCHAIR TRANSFERS Initial Assessment   Rolling Right 7 (Independent)   Rolling Left 7 (Independent)   Supine to Sit 7 (Independent)   Sit to Stand Contact guard assistance   Sit to Supine 7 (Independent)   Transfer Assist Score Transfer Type: Other  Other: Stand-step with RW with CGA/SBA  Transfer Assistance : 4 (Contact guard assistance)  Sit to Stand Assistance: Contact guard assistance  Car Transfers: Not tested  Car Type: n/a   Transfer Type Other   Comments Pt transfers sit<->stand and stand-step with RW with CGA/SBA for balance.  Pt is independent with bed mobility without rails   Car Transfer Not tested   Car Type n/a          WHEELCHAIR MOBILITY/MANAGEMENT Initial Assessment   Able to Propel 250 feet   Functional Level 6   Curbs/ramps assistance required 6 (Modified independent)   Wheelchair set up assistance required 6 (Modified independent)   Wheelchair management Manages right brake, Manages left brake          WALKING INDEPENDENCE Initial Assessment   Assistive device Walker, rolling   Ambulation assistance - level surface 4 (Contact guard assistance)   Distance 40 Feet (ft) (x2 reps)   Functional Level 1   Comments Pt ambulates 40ft with hop-to pattern with decreased step height and length, decreased balacne and decreased activity tolerance. Pt c/o pain in LLE with dependent positioning of ambulation   Ambulation assistance - unlevel surface            STEPS/STAIRS Initial Assessment   Steps/Stairs ambulated Level of Assist : 0 (Not tested)   Rail Use     Functional Level 0   Comments NT   Curbs/Ramps                PHYSICAL THERAPY PLAN OF CARE     Therapy Diagnosis:   Please see table above     Order received from MD for physical therapy services and chart reviewed. Pt to be seen 5 times per week for 3 hours of total therapy per day for 2 weeks. Thank you for the referral.     LTGs:  1. Pt will perform supine<>sit transfer with INDEPENDENT within 2 weeks in order to improve independence with functional mobility within the home. 2. Pt will perform sit<>stand form various surfaces with MODIFIED INDEPENDENCE within 2 weeks in order to improve independence with functional mobility within the home. 3. Pt will perform SPT with least restrictive device with MODIFIED INDEPENDENCE within 2 weeks in order to improve independence with functional mobility within the home. 4. Pt will amb with least restrictive device 50 with MODIFIED INDEPENDENCE within 2 weeks in order to improve independence with functional mobility within the home. Pt would benefit from skilled physical therapy in order to improve independent functional mobility within the home. Interventions may include range of motion (AROM, PROM B LE/trunk), motor function (B LE/trunk strengthening/coordination), activity tolerance (vitals, oxygen saturation levels), bed mobility training, balance activities, gait training (progressive ambulation program), and functional transfer training. Please see IRC;  Interdisciplinary Eval, Care Plan, and Patient Education for further information regarding physical therapy examination and plan of care.       Roberto Bruce, PT  8/26/2017

## 2017-08-26 NOTE — REHAB NOTE
SHIFT CHANGE NOTE FOR [facility]    Bedside and Verbal shift change report given to Mattie Grady RN (oncoming nurse) by Phillip Hernandez (offgoing nurse). Report included the following information SBAR, Kardex, MAR and Recent Results. Situation:   Code Status: Full Code   Reason for Admission: Left AKA  Hospital Day: 1   Problem List:   Hospital Problems  Date Reviewed: 8/26/2017          Codes Class Noted POA    Skin ulcer of malleolar area of right ankle ([facility] (Chronic) ICD-10-CM: L97.319  ICD-9-CM: 707.13  Unknown Yes        * (Principal)Status post above knee amputation of left lower extremity ([facility] ICD-10-CM: P23.056  ICD-9-CM: V49.76  8/18/2017 Yes    Overview Signed 8/21/2017 10:21 PM by Gonzalez Milligan MD     S/P Left above-the-knee amputation (8/18/2017 - Dr. Areli Canela)               Aftercare for amputation stump ICD-10-CM: Z47.81  ICD-9-CM: V54.89  8/18/2017 Yes    Overview Signed 8/21/2017 10:25 PM by Gonzalez Milligan MD     S/P Left above-the-knee amputation (8/18/2017 - Dr. Areli Canela)             Ischemic rest pain of lower extremity ([facility] ICD-10-CM: I73.9  ICD-9-CM: 443.9  8/14/2017 Yes    Overview Signed 8/15/2017 11:09 AM by Gonzalez Milligan MD     Left             Chronic ulcer of right heel ([facility] (Chronic) ICD-10-CM: L97.419  ICD-9-CM: 707.14  8/8/2017 Yes        Acute blood loss as cause of postoperative anemia ICD-10-CM: D62  ICD-9-CM: 285.1  7/25/2017 Yes        Aftercare following surgery of the circulatory system ICD-10-CM: Z48.812  ICD-9-CM: V58.73  7/25/2017 Yes    Overview Addendum 8/21/2017 10:22 PM by Gonzalez Milligan MD     S/P Removal of infected graft; Repair of left superficial femoral artery; Repair of left common femoral artery (7/25/2017 - Dr. Areli Canela); S/P Right axillary femoral jump bypass interposition; Removal of infected right axillary femoral bypass;  Wound VAC placement of upper thigh 1.2 cm x 5.2 cm x 1.8 cm and groin 4 cm x 0.5 cm x 0.2 cm (8/18/2017 - Dr. Willow Wong)             Impaired mobility and ADLs ICD-10-CM: Z74.09  ICD-9-CM: 799.89  7/24/2017 Yes        Infection of vascular bypass graft [facility]) ICD-10-CM: T82. 7XXA  ICD-9-CM: 996.62  7/24/2017 Yes    Overview Signed 8/7/2017  2:19 PM by Genie Negron MD     Left lower extremity             Anticoagulated on Coumadin (Chronic) ICD-10-CM: Z51.81, Z79.01  ICD-9-CM: V58.83, V58.61  Unknown Yes        Paroxysmal atrial fibrillation (HCC) ICD-10-CM: I48.0  ICD-9-CM: 427.31  Unknown Yes        Peripheral artery disease ([facility] (Chronic) ICD-10-CM: I73.9  ICD-9-CM: 443.9  1/25/2017 Yes        Benign hypertensive heart disease with systolic congestive heart failure, NYHA class 2 (HCC) (Chronic) ICD-10-CM: I11.0, I50.20  ICD-9-CM: 402.11, 428.20, 428.0  1/26/2015 Yes              Background:   Past Medical History:   Past Medical History:   Diagnosis Date    Acute blood loss as cause of postoperative anemia 4/4/2017    Anticoagulated on Coumadin     Aortoiliac occlusive disease ([address] 1/25/2017    Atherosclerosis of native artery of both lower extremities with intermittent claudication ([address] 1/25/2017    Benign hypertensive heart disease with systolic congestive heart failure, NYHA class 2 ([address] 1/26/2015    Carotid artery disease (HCC)     Chronic anemia     Chronic systolic heart failure (HCC)     Chronic ulcer of right foot ([address] 4/4/2017    Chronic ulcer of right heel ([address] 8/8/2017    Coronary artery disease involving native coronary artery of native heart 3/15/2017    Successful stenting of Cx (Xience LESLEY) and RCA (Xience LESLEY) to 0% by Dr. Meron Carr on 3/15/17.     DDD (degenerative disc disease), lumbar 1/26/2015    Dyslipidemia     Erectile dysfunction 7/5/2016    Euthyroid sick syndrome 4/6/2017    Hereditary peripheral neuropathy 11/15/2016    History of cardioversion 4/18/2017    S/P Synchronized external cardioversion (4/18/2017 - Dr. Christiano Bahena)    History of vitamin D deficiency 4/22/2017    Vitamin D 25-Hydroxy (4/22/2017) = 12.0; Vitamin D 25-Hydroxy (5/26/2017) = 38.7    Infection of vascular bypass graft ([facility] 7/24/2017    Left lower extremity    Ischemic cardiomyopathy     Moderate to severe pulmonary hypertension ([facility] 7/23/2017    Paroxysmal atrial fibrillation (HCC)     Peripheral artery disease ([facility] 1/25/2017    Skin ulcer of malleolar area of right ankle ([facility]     Spinal stenosis of lumbar region with radiculopathy 5/4/2015    Dr. Haylee Jones      Patient taking anticoagulants Heparin   Patient has a defibrillator: no    Assessment:   Changes in Assessment throughout shift:no change in assessment    Patient has central line:yes Reasons if yes: long term antibiotic therapy Insertion date:8/24/17 Last dressing date:8/24/17   Patient has Jiang Cath:no Reasons if yes:na  Insertion date:na     Last Vitals:     Vitals:    08/26/17 0631 08/26/17 0800 08/26/17 0923 08/26/17 1725   BP: 151/72 159/77 159/77 149/81   Pulse: 67 65  61   Resp:  20  20   Temp:  98.8 °F (37.1 °C)  97.8 °F (36.6 °C)   SpO2:  97%  100%   Weight:       Height:            PAIN    Pain Assessment    Pain Intensity 1: 0 (08/26/17 1613) Pain Intensity 1: 2 (12/29/14 1105)    Pain Location 1: Leg Pain Location 1: Abdomen    Pain Intervention(s) 1: Medication (see MAR) Pain Intervention(s) 1: Medication (see MAR)  Patient Stated Pain Goal: 0 Patient Stated Pain Goal: 0  o Intervention effective: yes  o Other actions taken for pain: repositioned     Skin Assessment  Skin color Skin Color: Appropriate for ethnicity  Condition/Temperature Skin Condition/Temp: Dry, Warm  Integrity Skin Integrity: Incision (comment) (stables at end of AKA, incision/wound left groin)  Turgor Turgor: Non-tenting  Weekly Pressure Ulcer Documentation  Pressure  Injury Documentation: No Pressure Injury Noted-Pressure Ulcer Prevention Initiated  Wound Prevention & Protection Methods  Orientation of wound Orientation of Wound Prevention: Posterior  Location of Prevention Location of Wound Prevention: Sacrum/Coccyx  Dressing Present Dressing Present : No  Dressing Status    Wound Offloading Wound Offloading (Prevention Methods): Bed, pressure redistribution/air     INTAKE/OUPUT    Date 08/25/17 0700 - 08/26/17 0659 08/26/17 0700 - 08/27/17 0659   Shift 7912-5433 4769-4118 24 Hour Total 0700-1859 1900-0659 24 Hour Total   I  N  T  A  K  E   Shift Total  (mL/kg)         O  U  T  P  U  T   Urine  (mL/kg/hr)  1625  (1.9) 1625  (1)         Urine Voided  1625 1625         Urine Occurrence(s) 0 x 7 x 7 x 6 x  6 x    Stool            Stool Occurrence(s) 0 x 0 x 0 x 0 x  0 x    Shift Total  (mL/kg)  1625  (23) 1625  (23)      NET  -1625 -1625      Weight (kg) 70.8 70.8 70.8 70.8 70.8 70.8       Recommendations:  1. Patient needs and requests: urinal emptied ,repositioned    2. Diet: Cardiac     3. Pending tests/procedures: labs    4. Functional Level/Equipment: assist x 1 / w/c    5. Estimated Discharge Date: tbd Posted on Whiteboard in Patients Room: [facility] Safety Check    A safety check occurred in the patient's room between off going nurse and oncoming nurse listed above. The safety check included the below items  Area Items   H  High Alert Medications - Verify all high alert medication drips (heparin, PCA, etc.)   E  Equipment - Suction is set up for ALL patients (with yanker)  - Red plugs utilized for all equipment (IV pumps, etc.)  - WOWs wiped down at end of shift.  - Room stocked with oxygen, suction, and other unit-specific supplies   A  Alarms - Bed alarm is set for fall risk patients  - Ensure chair alarm is in place and activated if patient is up in a chair   L  Lines - Check IV for any infiltration  - Jiang bag is empty if patient has a Jiang   - Tubing and IV bags are labeled   S  Safety   - Room is clean, patient is clean, and equipment is clean. - Hallways are clear from equipment besides carts.    - Fall bracelet on for fall risk patients  - Ensure room is clear and free of clutter  - Suction is set up for ALL patients (with cailin)  - Hallways are clear from equipment besides carts.    - Isolation precautions followed, supplies available outside room, sign posted

## 2017-08-26 NOTE — PROGRESS NOTES
NUTRITION    Nutrition Consult: General Nutrition Management & Supplements     RECOMMENDATIONS / PLAN:     - Add supplements: Ensure Enlive BID.   - Continue RD inpatient monitoring and evaluation. NUTRITION INTERVENTIONS & DIAGNOSIS:     [x] Meals/Snacks: modified diet  [x] Medical food supplementation: initiate     Nutrition Diagnosis: Increased nutrient needs (protein) related to wound healing as evidenced by wound vac to left groin. ASSESSMENT:     Good appetite. Likes Ensure Enlive supplements, receiving during inpatient hospitalization. Average po intake adequate to meet patients estimated nutritional needs:   [] Yes     [x] No   [] Unable to determine at this time    Diet: DIET CARDIAC Regular      Food Allergies: NKFA  Current Appetite:   [x] Good     [] Fair     [] Poor     [] Other:  Appetite/meal intake prior to admission:   [x] Good     [] Fair     [] Poor     [] Other:  Feeding Limitations:  [] Swallowing difficulty    [] Chewing difficulty    [] Other:  Current Meal Intake: No data found.     BM:  8/24  Skin Integrity: surgical incision to left groin with wound vac; surgical incisions to chest, leg and thigh; vascular wound to right ankle and heel; left BKA  Edema: none  Pertinent Medications: Reviewed: ascorbic acid, cholecalciferol, cyanocobalamin, colace, ferrous sulfate, lactobacillus, mag-ox, miralax, KCl, zinc sulfate    Recent Labs      08/26/17   0710  08/25/17   0315  08/24/17   0300   NA  133*   --   133*   K  3.9   --   4.3   CL  99*   --   98*   CO2  26   --   29   GLU  96   --   92   BUN  8   --   7   CREA  0.62   --   0.50*   CA  9.0   --   8.7   MG  1.9  1.9  1.8       Intake/Output Summary (Last 24 hours) at 08/26/17 1053  Last data filed at 08/26/17 0600   Gross per 24 hour   Intake                0 ml   Output             1625 ml   Net            -1625 ml       Anthropometrics:  Ht Readings from Last 1 Encounters:   08/25/17 5' 10\" (1.778 m)     Last 3 Recorded Weights in this Encounter    08/25/17 1330   Weight: 70.8 kg (156 lb 1.4 oz)     Body mass index is 22.4 kg/(m^2). Adjusted IBW: 67 kg    Weight History: Denies weight changes PTA. Weight Metrics 8/25/2017 8/24/2017 8/17/2017 8/7/2017 8/5/2017 7/24/2017 7/17/2017   Weight 156 lb 1.4 oz 156 lb - 165 lb 5.5 oz 165 lb 2 oz - 156 lb   BMI 22.4 kg/m2 - 22.38 kg/m2 23.72 kg/m2 - 23.69 kg/m2 22.38 kg/m2        Admitting Diagnosis: Left AKA  Status post above knee amputation of left lower extremity (HCC)  Pertinent PMHx: atherosclerosis, CHF    Education Needs:        [x] None identified  [] Identified - Not appropriate at this time  []  Identified and addressed - refer to education log  Learning Limitations:   [x] None identified  [] Identified    Cultural, Confucianism & ethnic food preferences:  [x] None identified    [] Identified and addressed     ESTIMATED NUTRITION NEEDS:     Calories: 9886-0905 kcal (MSJx1.2-1.3) based on  [x] Actual BW  68 kg    [] IBW   Protein: 81-88 gm (1.2-1.3 gm/kg) based on  [x] Actual BW      [] IBW   Fluid: 1 mL/kcal     MONITORING & EVALUATION:     Nutrition Goal(s):   1. Po intake of meals will meet >75% of patient estimated nutritional needs within the next 7 days.   Outcome:  [] Met/Ongoing    []  Not Met    [x] New/Initial Goal     Monitoring:   [x] Diet tolerance   [x] Meal intake   [x] Supplement intake   [] GI symptoms/ability to tolerate po diet   [] Respiratory status   [] Plan of care      Previous Recommendations (for follow-up assessments only):     []   Implemented       []   Not Implemented (RD to address)     [] No Recommendation Made     Discharge Planning: cardiac diet   [x] Participated in care planning, discharge planning, & interdisciplinary rounds as appropriate      Buffy Forbes, [address]    Pager: [phone]

## 2017-08-27 LAB
INR PPP: 1.3 (ref 0.8–1.2)
PROTHROMBIN TIME: 15.5 SEC (ref 11.5–15.2)

## 2017-08-27 PROCEDURE — 97530 THERAPEUTIC ACTIVITIES: CPT

## 2017-08-27 PROCEDURE — 65310000000 HC RM PRIVATE REHAB

## 2017-08-27 PROCEDURE — 74011250637 HC RX REV CODE- 250/637: Performed by: INTERNAL MEDICINE

## 2017-08-27 PROCEDURE — 36415 COLL VENOUS BLD VENIPUNCTURE: CPT | Performed by: INTERNAL MEDICINE

## 2017-08-27 PROCEDURE — 74011000258 HC RX REV CODE- 258: Performed by: INTERNAL MEDICINE

## 2017-08-27 PROCEDURE — 74011000250 HC RX REV CODE- 250: Performed by: INTERNAL MEDICINE

## 2017-08-27 PROCEDURE — 97116 GAIT TRAINING THERAPY: CPT

## 2017-08-27 PROCEDURE — 74011250636 HC RX REV CODE- 250/636: Performed by: INTERNAL MEDICINE

## 2017-08-27 PROCEDURE — 97110 THERAPEUTIC EXERCISES: CPT

## 2017-08-27 PROCEDURE — 77030011256 HC DRSG MEPILEX <16IN NO BORD MOLN -A

## 2017-08-27 PROCEDURE — 85610 PROTHROMBIN TIME: CPT | Performed by: INTERNAL MEDICINE

## 2017-08-27 RX ORDER — WARFARIN 7.5 MG/1
7.5 TABLET ORAL ONCE
Status: COMPLETED | OUTPATIENT
Start: 2017-08-27 | End: 2017-08-27

## 2017-08-27 RX ADMIN — HYDROMORPHONE HYDROCHLORIDE 4 MG: 2 TABLET ORAL at 17:20

## 2017-08-27 RX ADMIN — DOCUSATE SODIUM 100 MG: 100 CAPSULE, LIQUID FILLED ORAL at 08:39

## 2017-08-27 RX ADMIN — HYDROMORPHONE HYDROCHLORIDE 4 MG: 2 TABLET ORAL at 01:01

## 2017-08-27 RX ADMIN — FUROSEMIDE 20 MG: 20 TABLET ORAL at 08:38

## 2017-08-27 RX ADMIN — ATORVASTATIN CALCIUM 40 MG: 40 TABLET, FILM COATED ORAL at 21:36

## 2017-08-27 RX ADMIN — AMPICILLIN AND SULBACTAM 3 G: 1; 2 INJECTION, POWDER, FOR SOLUTION INTRAMUSCULAR; INTRAVENOUS at 16:14

## 2017-08-27 RX ADMIN — POTASSIUM CHLORIDE 20 MEQ: 1.5 POWDER, FOR SOLUTION ORAL at 08:38

## 2017-08-27 RX ADMIN — HEPARIN SODIUM 5000 UNITS: 5000 INJECTION, SOLUTION INTRAVENOUS; SUBCUTANEOUS at 14:01

## 2017-08-27 RX ADMIN — POLYETHYLENE GLYCOL 3350 17 G: 17 POWDER, FOR SOLUTION ORAL at 18:12

## 2017-08-27 RX ADMIN — AMPICILLIN AND SULBACTAM 3 G: 1; 2 INJECTION, POWDER, FOR SOLUTION INTRAMUSCULAR; INTRAVENOUS at 22:00

## 2017-08-27 RX ADMIN — CYANOCOBALAMIN TAB 1000 MCG 1000 MCG: 1000 TAB at 08:38

## 2017-08-27 RX ADMIN — LOSARTAN POTASSIUM 100 MG: 50 TABLET ORAL at 05:53

## 2017-08-27 RX ADMIN — WARFARIN SODIUM 7.5 MG: 7.5 TABLET ORAL at 17:20

## 2017-08-27 RX ADMIN — AMPICILLIN AND SULBACTAM 3 G: 1; 2 INJECTION, POWDER, FOR SOLUTION INTRAMUSCULAR; INTRAVENOUS at 10:18

## 2017-08-27 RX ADMIN — ZINC SULFATE 220 MG (50 MG) CAPSULE 220 MG: CAPSULE at 08:40

## 2017-08-27 RX ADMIN — METOPROLOL TARTRATE 25 MG: 25 TABLET, FILM COATED ORAL at 22:29

## 2017-08-27 RX ADMIN — ASPIRIN 81 MG 81 MG: 81 TABLET ORAL at 08:40

## 2017-08-27 RX ADMIN — Medication 400 MG: at 08:39

## 2017-08-27 RX ADMIN — Medication 1 CAPSULE: at 08:38

## 2017-08-27 RX ADMIN — AMPICILLIN AND SULBACTAM 3 G: 1; 2 INJECTION, POWDER, FOR SOLUTION INTRAMUSCULAR; INTRAVENOUS at 04:21

## 2017-08-27 RX ADMIN — HEPARIN SODIUM 5000 UNITS: 5000 INJECTION, SOLUTION INTRAVENOUS; SUBCUTANEOUS at 05:04

## 2017-08-27 RX ADMIN — HEPARIN SODIUM 5000 UNITS: 5000 INJECTION, SOLUTION INTRAVENOUS; SUBCUTANEOUS at 22:00

## 2017-08-27 RX ADMIN — HYDROMORPHONE HYDROCHLORIDE 4 MG: 2 TABLET ORAL at 05:04

## 2017-08-27 RX ADMIN — ALTEPLASE 1 MG: 2.2 INJECTION, POWDER, LYOPHILIZED, FOR SOLUTION INTRAVENOUS at 20:19

## 2017-08-27 RX ADMIN — DOCUSATE SODIUM 100 MG: 100 CAPSULE, LIQUID FILLED ORAL at 19:20

## 2017-08-27 RX ADMIN — AMIODARONE HYDROCHLORIDE 200 MG: 200 TABLET ORAL at 08:40

## 2017-08-27 RX ADMIN — HYDROMORPHONE HYDROCHLORIDE 4 MG: 2 TABLET ORAL at 21:36

## 2017-08-27 RX ADMIN — VITAMIN D, TAB 1000IU (100/BT) 2000 UNITS: 25 TAB at 08:38

## 2017-08-27 RX ADMIN — Medication 325 MG: at 08:39

## 2017-08-27 RX ADMIN — Medication 250 MG: at 08:40

## 2017-08-27 RX ADMIN — GABAPENTIN 300 MG: 300 CAPSULE ORAL at 05:04

## 2017-08-27 RX ADMIN — GABAPENTIN 300 MG: 300 CAPSULE ORAL at 22:00

## 2017-08-27 RX ADMIN — GABAPENTIN 300 MG: 300 CAPSULE ORAL at 14:01

## 2017-08-27 NOTE — PROGRESS NOTES
Problem: Falls - Risk of  Goal: *Absence of Falls  Document Antonio Fall Risk and appropriate interventions in the flowsheet.    Fall Risk Interventions:  Mobility Interventions: Bed/chair exit alarm, Communicate number of staff needed for ambulation/transfer, OT consult for ADLs, Patient to call before getting OOB, PT Consult for mobility concerns     Mentation Interventions: Bed/chair exit alarm     Medication Interventions: Bed/chair exit alarm     Elimination Interventions: Bed/chair exit alarm, Call light in reach, Patient to call for help with toileting needs, Toileting schedule/hourly rounds     History of Falls Interventions: Bed/chair exit alarm, Consult care management for discharge planning, Door open when patient unattended, Evaluate medications/consider consulting pharmacy, Room close to nurse's station

## 2017-08-27 NOTE — PROGRESS NOTES
Problem: Mobility Impaired (Adult and Pediatric)  Goal: *Acute Goals and Plan of Care (Insert Text)  Physical Therapy Goals  Initiated 2017 and to be accomplished within 14 day(s)  1. Patient will move from supine to sit and sit to supine , scoot up and down and roll side to side in bed with independence. 2. Patient will transfer from bed to chair and chair to bed with modified independence using the least restrictive device. 3. Patient will perform sit to stand with modified independence. 4. Patient will ambulate with modified independence for 50 feet with the least restrictive device. 5. Patient will ascend/descend 3 stairs with 2 handrail(s) with supervision/set-up. Physical Therapy Goals  Initiated 2017 and to be accomplished within 7 day(s)  1. Patient will move from supine to sit and sit to supine , scoot up and down and roll side to side in bed with independence. 2. Patient will transfer from bed to chair and chair to bed with supervision/set-up using the least restrictive device. 3. Patient will perform sit to stand with supervision/set-up. 4. Patient will ambulate with supervision/set-up for 50 feet with the least restrictive device. 5. Patient will ascend/descend 3 stairs with 2 handrail(s) with minimal assistance/contact guard assist.     PHYSICAL THERAPY DAILY NOTE  Patient Name:Mj Wen Amber  Time In: 1030  Time Out: 1130  Patient Seen For: Balance activities; Equipment assessment;Gait training;Patient education; Therapeutic exercise;Transfer training  Diagnosis: Left AKA  Status post above knee amputation of left lower extremity ([facility] Status post above knee amputation of left lower extremity ([facility])  Precautions: Fall,NWB L LE     Subjective: \"All I want to know is that I am getting better. \"     Pain at start of tx:3  Pain at stop of tx:4 post gait training in L LE (dependent position).      Patient identified with name and :Yes         Objective:  BED/MAT MOBILITY Daily Assessment     Rolling Right : 7 (Independent)  Rolling Left : 7 (Independent)  Supine to Sit : 7 (Independent)  Sit to Supine : 7 (Independent)          TRANSFERS Daily Assessment     Transfer Type: Other  Other: stand step with RW  Transfer Assistance : 5 (Stand-by assistance)  Sit to Stand Assistance: Stand-by assistance (verbal cues for technique and sequence)   Pt performs sit<>stand x's 6 repetitions with minimal verbal cues for proper technique,sequence,and safety. Fair carryover noted with latter trials post break from activity. GAIT Daily Assessment     Amount of Assistance: 5 (Stand-by assistance)  Distance (ft): 32 Feet (ft)  Assistive Device: Walker, rolling (additional time req'd (slow pace) increased L LE pain)   Pt demo's slow and deliberate pacing throughout. Verbal cues for increased scapular adduction and shoulder depression with swing to maximize latissimus recruitment and limit anterior shoulder strain. Trial ended 2/2 increasing discomfort in residual limb from prolonged dependent position.           BALANCE Daily Assessment     Sitting - Static: Normal   Standing - Static: Fair  Standing - Dynamic : Impaired          WHEELCHAIR MOBILITY Daily Assessment     Able to Propel (ft): 150 feet  Functional Level: 6  Curbs/Ramps Assist Required (FIM Score): 6 (Modified independent)  Wheelchair Setup Assist Required : 6 (Modified independent)  Wheelchair Management: Manages left brake;Manages right brake;Manages left armrest;Manages right armrest;Manages left footrest;Manages right footrest          LOWER EXTREMITY EXERCISES Daily Assessment     Extremity: Left  Exercise Type #1: Supine lower extremity strengthening (hip ext,adduction,abduction isometrics (5\" hold))  Sets Performed: 3  Reps Performed: 10  Level of Assist: Supervision  Exercise Type #2: Supine lower extremity strengthening (R LE heel slides,hip abd/add with 2# )  Sets Performed: 3  Reps Performed: 10  Level of Assist: Supervision              Assessment: Increased transfer and ambulation proficiency noted as pt is able to perform throughout with decreased assist req'd (0 LOB). Single trial distance slightly decreased as gait is challenged post transfer training and therapeutic exercise. Pt demo's need for minimal verbal cues for maximal safety compliance during sit<>stand. Pt is receptive and motivated throughout session. Plan of Care: Continue POC as pt is progressing well toward all established goals.      Crocker , PTA  8/27/2017

## 2017-08-27 NOTE — REHAB NOTE
SHIFT CHANGE NOTE FOR [facility]    Bedside and Verbal shift change report given to Zach Webb RN (oncoming nurse) by Zach WebbRN (offgoing nurse). Report included the following information SBAR, Kardex, MAR and Recent Results. Situation:   Code Status: Full Code   Reason for Admission: Left AKA  Hospital Day: 2   Problem List:   Hospital Problems  Date Reviewed: 8/27/2017          Codes Class Noted POA    Skin ulcer of malleolar area of right ankle ([facility] [address] (Chronic) ICD-10-CM: L97.319  ICD-9-CM: 707.13  Unknown Yes        * (Principal)Status post above knee amputation of left lower extremity ([facility] [address] ICD-10-CM: B29.555  ICD-9-CM: V49.76  8/18/2017 Yes    Overview Signed 8/21/2017 10:21 PM by Suraj Garcia MD     S/P Left above-the-knee amputation (8/18/2017 - Dr. Marcia Portillo)               Aftercare for amputation stump ICD-10-CM: Z47.81  ICD-9-CM: V54.89  8/18/2017 Yes    Overview Signed 8/21/2017 10:25 PM by Suraj Garcia MD     S/P Left above-the-knee amputation (8/18/2017 - Dr. Marcia Portillo)             Ischemic rest pain of lower extremity ([facility] [address] ICD-10-CM: I73.9  ICD-9-CM: 443.9  8/14/2017 Yes    Overview Signed 8/15/2017 11:09 AM by Suraj Garcia MD     Left             Chronic ulcer of right heel ([facility] (Chronic) ICD-10-CM: L97.419  ICD-9-CM: 707.14  8/8/2017 Yes        Acute blood loss as cause of postoperative anemia ICD-10-CM: D62  ICD-9-CM: 285.1  7/25/2017 Yes        Aftercare following surgery of the circulatory system ICD-10-CM: Z48.812  ICD-9-CM: V58.73  7/25/2017 Yes    Overview Addendum 8/21/2017 10:22 PM by Suraj Garcia MD     S/P Removal of infected graft; Repair of left superficial femoral artery; Repair of left common femoral artery (7/25/2017 - Dr. Marcia Portillo); S/P Right axillary femoral jump bypass interposition; Removal of infected right axillary femoral bypass;  Wound VAC placement of upper thigh 1.2 cm x 5.2 cm x 1.8 cm and groin 4 cm x 0.5 cm x 0.2 cm (8/18/2017 - Dr. Jovanni Hightower)             Impaired mobility and ADLs ICD-10-CM: Z74.09  ICD-9-CM: 799.89  7/24/2017 Yes        Infection of vascular bypass graft [facility]) ICD-10-CM: T82. 7XXA  ICD-9-CM: 996.62  7/24/2017 Yes    Overview Signed 8/7/2017  2:19 PM by Helga Robles MD     Left lower extremity             Anticoagulated on Coumadin (Chronic) ICD-10-CM: Z51.81, Z79.01  ICD-9-CM: V58.83, V58.61  Unknown Yes        Paroxysmal atrial fibrillation (HCC) ICD-10-CM: I48.0  ICD-9-CM: 427.31  Unknown Yes        Peripheral artery disease ([facility] [address] (Chronic) ICD-10-CM: I73.9  ICD-9-CM: 443.9  1/25/2017 Yes        Benign hypertensive heart disease with systolic congestive heart failure, NYHA class 2 (HCC) (Chronic) ICD-10-CM: I11.0, I50.20  ICD-9-CM: 402.11, 428.20, 428.0  1/26/2015 Yes              Background:   Past Medical History:   Past Medical History:   Diagnosis Date    Acute blood loss as cause of postoperative anemia 4/4/2017    Anticoagulated on Coumadin     Aortoiliac occlusive disease ([address] 1/25/2017    Atherosclerosis of native artery of both lower extremities with intermittent claudication ([address] 1/25/2017    Benign hypertensive heart disease with systolic congestive heart failure, NYHA class 2 ([address] 1/26/2015    Carotid artery disease (HCC)     Chronic anemia     Chronic systolic heart failure (HCC)     Chronic ulcer of right foot ([address] 4/4/2017    Chronic ulcer of right heel ([address] 8/8/2017    Coronary artery disease involving native coronary artery of native heart 3/15/2017    Successful stenting of Cx (Xience LSELEY) and RCA (Xience LESLEY) to 0% by Dr. Dulce Reyes on 3/15/17.     DDD (degenerative disc disease), lumbar 1/26/2015    Dyslipidemia     Erectile dysfunction 7/5/2016    Euthyroid sick syndrome 4/6/2017    Hereditary peripheral neuropathy 11/15/2016    History of cardioversion 4/18/2017    S/P Synchronized external cardioversion (4/18/2017 - Dr. Sridhar Romero)    History of vitamin D deficiency 4/22/2017    Vitamin D 25-Hydroxy (4/22/2017) = 12.0; Vitamin D 25-Hydroxy (5/26/2017) = 38.7    Infection of vascular bypass graft ([facility] 7/24/2017    Left lower extremity    Ischemic cardiomyopathy     Moderate to severe pulmonary hypertension ([facility] 7/23/2017    Paroxysmal atrial fibrillation (HCC)     Peripheral artery disease ([facility] 1/25/2017    Skin ulcer of malleolar area of right ankle ([facility]     Spinal stenosis of lumbar region with radiculopathy 5/4/2015    Dr. Callie Matthews      Patient taking anticoagulants Heparin   Patient has a defibrillator: no    Assessment:   Changes in Assessment throughout shift:no change in assessment    Patient has central line:yes Reasons if yes: long term antibiotic therapy Insertion date:8/24/17 Last dressing date:8/24/17   Patient has Jiang Cath:no Reasons if yes:na  Insertion date:na     Last Vitals:     Vitals:    08/26/17 2100 08/27/17 0553 08/27/17 0823 08/27/17 1600   BP: 135/64 128/66 157/73 145/74   Pulse: 62 68 (!) 56 66   Resp: 18  18 18   Temp: 98.1 °F (36.7 °C)  97.1 °F (36.2 °C) 97.5 °F (36.4 °C)   SpO2: 100%  98% 98%   Weight:       Height:            PAIN    Pain Assessment    Pain Intensity 1: 3 (08/27/17 1815) Pain Intensity 1: 2 (12/29/14 1105)    Pain Location 1: Leg Pain Location 1: Abdomen    Pain Intervention(s) 1: Medication (see MAR) Pain Intervention(s) 1: Medication (see MAR)  Patient Stated Pain Goal: 0 Patient Stated Pain Goal: 0  o Intervention effective: yes  o Other actions taken for pain: repositioned     Skin Assessment  Skin color Skin Color: Appropriate for ethnicity  Condition/Temperature Skin Condition/Temp: Warm  Integrity Skin Integrity: Incision (comment), Wound (add Wound LDA)  Turgor Turgor: Non-tenting  Weekly Pressure Ulcer Documentation  Pressure  Injury Documentation: No Pressure Injury Noted-Pressure Ulcer Prevention Initiated  Wound Prevention & Protection Methods  Orientation of wound Orientation of Wound Prevention: Posterior  Location of Prevention Location of Wound Prevention: Sacrum/Coccyx  Dressing Present Dressing Present : No  Dressing Status    Wound Offloading Wound Offloading (Prevention Methods): Bed, pressure redistribution/air     INTAKE/OUPUT    Date 08/26/17 1900 - 08/27/17 0659 08/27/17 0700 - 08/28/17 0659   Shift 6301-3424 24 Hour Total 5409-0348 6858-2663 24 Hour Total   I  N  T  A  K  E   Shift Total  (mL/kg)        O  U  T  P  U  T   Urine  (mL/kg/hr) 1775 1775         Urine Voided 1775 1775         Urine Occurrence(s) 4 x 10 x 7 x  7 x    Stool 0 0         Stool Occurrence(s) 0 x 0 x 1 x  1 x      Stool 0 0       Shift Total  (mL/kg) 1775  (25.1) 1775  (25.1)      NET -1775 -1775      Weight (kg) 70.8 70.8 70.8 70.8 70.8       Recommendations:  1. Patient needs and requests: urinal emptied ,repositioned    2. Diet: Cardiac     3. Pending tests/procedures: labs    4. Functional Level/Equipment: assist x 1 / w/c    5. Estimated Discharge Date: tbd Posted on Whiteboard in Patients Room: [facility] Safety Check    A safety check occurred in the patient's room between off going nurse and oncoming nurse listed above. The safety check included the below items  Area Items   H  High Alert Medications - Verify all high alert medication drips (heparin, PCA, etc.)   E  Equipment - Suction is set up for ALL patients (with yanker)  - Red plugs utilized for all equipment (IV pumps, etc.)  - WOWs wiped down at end of shift.  - Room stocked with oxygen, suction, and other unit-specific supplies   A  Alarms - Bed alarm is set for fall risk patients  - Ensure chair alarm is in place and activated if patient is up in a chair   L  Lines - Check IV for any infiltration  - Jiang bag is empty if patient has a Jiang   - Tubing and IV bags are labeled   S  Safety   - Room is clean, patient is clean, and equipment is clean. - Hallways are clear from equipment besides carts.    - Fall bracelet on for fall risk patients  - Ensure room is clear and free of clutter  - Suction is set up for ALL patients (with cailin)  - Hallways are clear from equipment besides carts.    - Isolation precautions followed, supplies available outside room, sign posted

## 2017-08-27 NOTE — REHAB NOTE
[facility] PHYSICAL REHABILITATION  [address] [place] [address], [address]     [address] OF [facility]    Name: Mary Shay CSN: [id number]   Age: 59 y.o. MRN: [id number]   Sex: male Admit Date: 8/25/2017     Primary Rehabilitation Diagnosis  1. Impaired Mobility and ADLs  2. S/P Left above-the-knee amputation (8/18/2017 - Dr. Malu Chahal)  3. History of ischemic rest pain of the left lower extremity due to severe peripheral vascular disease      Comorbidities   Benign hypertensive heart disease with systolic congestive heart failure, NYHA class 2  I11.0, I50.20    DDD (degenerative disc disease), lumbar M51.36    Erectile dysfunction N52.9    Spinal stenosis of lumbar region with radiculopathy M48.06, M54.16    Hereditary peripheral neuropathy G60.9    Aortoiliac occlusive disease  I74.09    Atherosclerosis of native artery of both lower extremities with intermittent claudication  I70.213    Peripheral artery disease  I73.9    Coronary artery disease involving native coronary artery of native heart I25.10    Paroxysmal atrial fibrillation  I48.0    Acute blood loss as cause of postoperative anemia D62    Anticoagulated on Coumadin Z51.81, Z79.01    Ischemic cardiomyopathy I25.5    Chronic systolic heart failure  V95.34    Carotid artery disease  I77.9    Dyslipidemia E78.5    Euthyroid sick syndrome E07.81    Aftercare following surgery of the circulatory system Z48.812    Status post femoral-popliteal bypass surgery Z95.828    History of cardioversion Z98.890    Critical ischemia of right lower extremity I99.8    History of vitamin D deficiency Z86.39    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    Infection of vascular bypass graft  T82. 7XXA    Chronic anemia D64.9    Chronic ulcer of right heel (HCC) L97.419    Prolonged Q-T interval on ECG R94.31    Aftercare for amputation stump Z47.81    Moderate to severe pulmonary hypertension  I27.2    Adverse effect of amiodarone T46.2X5A    Skin ulcer of malleolar area of right ankle  L97.319         ANTICIPATED INTERVENTIONS THAT SUPPORT THE MEDICAL NECESSITY OF THIS ADMISSION:    I. Physical Therapy              A. Intensity: 1.5 hour per day              B. Frequency: 5 times per week              C. Duration: 2 weeks              D. Long Term Goals:    1. Patient will move from supine to sit and sit to supine , scoot up and down and roll side to side in bed with independence. 2. Patient will transfer from bed to chair and chair to bed with modified independence using the least restrictive device. 3. Patient will perform sit to stand with modified independence. 4. Patient will ambulate with modified independence for 50 feet with the least restrictive device. 5. Patient will ascend/descend 3 stairs with 2 handrail(s) with supervision/set-up. E. Interventions: Interventions may include range of motion (AROM, PROM B LE/trunk), motor function (B LE/trunk strengthening/coordination), activity tolerance (vitals, oxygen saturation levels), bed mobility training, balance activities, gait training (progressive ambulation program), and functional transfer training. II. Occupational Therapy  21 . Intensity: 1.5 hour per day              B. Frequency: 5 times per week              C. Duration: 2 weeks              D. Long Term Goals:    1. Pt will perform self-feeding with independence. 2. Pt will perform grooming with independent. 3. Pt will perform UB bathing with independence. 4. Pt will perform LB bathing with Mod I.    5. Pt will perform tub/shower transfer with Mod I.     6. Pt will perform UB dressing with independence. 7. Pt will perform LB dressing with Mod Iindependence. 8. Pt will perform toileting task with Mod independence.     9. Pt will perform toilet transfer with Mod I.   E. Interventions: Interventions may include range of motion (AROM, PROM B UE), motor function (B UE/ strengthening/coordination), activity tolerance (vitals, oxygen saturation levels), balance training, ADL/IADL training and functional transfer training. PHYSICIAN'S ASSESSMENT OF FINDINGS:    Are the established goals sufficient for achieving the optimal level of function? [x]  Yes      []  No    What changes would you recommend to the goals as written? None      Are the interventions noted sufficient for achieving the optimal level of function? [x]  Yes      []  No    What changes would you recommend to the interventions noted? If therapy staff is unable to provide 3 hr of total therapy per day in 5 days due to medical issues or decreased patient tolerance, may modify treatment schedule to 15 hr/week.       Estimated length of stay: 2 weeks      Medical rehabilitation prognosis:    []  Excellent     [x]  Good     []  Fair     []  Guarded       Discharge Destination:     [x]  Home     []  [address]      []  [facility]     []  [address]     []  [place]     []  Other:       Signed:    Brandy Varela MD    August 27, 2017

## 2017-08-27 NOTE — REHAB NOTE
SHIFT CHANGE NOTE FOR [facility]    Bedside and Verbal shift change report given to Anibal Ashford RN (oncoming nurse) by Zach Webb RN (offgoing nurse). Report included the following information SBAR, Kardex, MAR and Recent Results. Situation:   Code Status: Full Code   Reason for Admission: Left AKA  Hospital Day: 2   Problem List:   Hospital Problems  Date Reviewed: 8/26/2017          Codes Class Noted POA    Skin ulcer of malleolar area of right ankle ([facility] (Chronic) ICD-10-CM: L97.319  ICD-9-CM: 707.13  Unknown Yes        * (Principal)Status post above knee amputation of left lower extremity ([facility] ICD-10-CM: C22.027  ICD-9-CM: V49.76  8/18/2017 Yes    Overview Signed 8/21/2017 10:21 PM by Suraj Garcia MD     S/P Left above-the-knee amputation (8/18/2017 - Dr. Marcia Portillo)               Aftercare for amputation stump ICD-10-CM: Z47.81  ICD-9-CM: V54.89  8/18/2017 Yes    Overview Signed 8/21/2017 10:25 PM by Suraj Garcia MD     S/P Left above-the-knee amputation (8/18/2017 - Dr. Marcia Portillo)             Ischemic rest pain of lower extremity ([facility] ICD-10-CM: I73.9  ICD-9-CM: 443.9  8/14/2017 Yes    Overview Signed 8/15/2017 11:09 AM by Suraj Garcia MD     Left             Chronic ulcer of right heel ([facility] (Chronic) ICD-10-CM: L97.419  ICD-9-CM: 707.14  8/8/2017 Yes        Acute blood loss as cause of postoperative anemia ICD-10-CM: D62  ICD-9-CM: 285.1  7/25/2017 Yes        Aftercare following surgery of the circulatory system ICD-10-CM: Z48.812  ICD-9-CM: V58.73  7/25/2017 Yes    Overview Addendum 8/21/2017 10:22 PM by Suraj Garcia MD     S/P Removal of infected graft; Repair of left superficial femoral artery; Repair of left common femoral artery (7/25/2017 - Dr. Marcia Portillo); S/P Right axillary femoral jump bypass interposition; Removal of infected right axillary femoral bypass;  Wound VAC placement of upper thigh 1.2 cm x 5.2 cm x 1.8 cm and groin 4 cm x 0.5 cm x 0.2 cm (8/18/2017 - Dr. Lisa Orozco)             Impaired mobility and ADLs ICD-10-CM: Z74.09  ICD-9-CM: 799.89  7/24/2017 Yes        Infection of vascular bypass graft [facility]) ICD-10-CM: T82. 7XXA  ICD-9-CM: 996.62  7/24/2017 Yes    Overview Signed 8/7/2017  2:19 PM by Riya Noe MD     Left lower extremity             Anticoagulated on Coumadin (Chronic) ICD-10-CM: Z51.81, Z79.01  ICD-9-CM: V58.83, V58.61  Unknown Yes        Paroxysmal atrial fibrillation (HCC) ICD-10-CM: I48.0  ICD-9-CM: 427.31  Unknown Yes        Peripheral artery disease ([facility] (Chronic) ICD-10-CM: I73.9  ICD-9-CM: 443.9  1/25/2017 Yes        Benign hypertensive heart disease with systolic congestive heart failure, NYHA class 2 (HCC) (Chronic) ICD-10-CM: I11.0, I50.20  ICD-9-CM: 402.11, 428.20, 428.0  1/26/2015 Yes              Background:   Past Medical History:   Past Medical History:   Diagnosis Date    Acute blood loss as cause of postoperative anemia 4/4/2017    Anticoagulated on Coumadin     Aortoiliac occlusive disease ([address] 1/25/2017    Atherosclerosis of native artery of both lower extremities with intermittent claudication ([address] 1/25/2017    Benign hypertensive heart disease with systolic congestive heart failure, NYHA class 2 ([address] 1/26/2015    Carotid artery disease (HCC)     Chronic anemia     Chronic systolic heart failure (HCC)     Chronic ulcer of right foot ([address] 4/4/2017    Chronic ulcer of right heel ([address] 8/8/2017    Coronary artery disease involving native coronary artery of native heart 3/15/2017    Successful stenting of Cx (Xience LESLEY) and RCA (Xience LESLEY) to 0% by Dr. Domonique Puente on 3/15/17.     DDD (degenerative disc disease), lumbar 1/26/2015    Dyslipidemia     Erectile dysfunction 7/5/2016    Euthyroid sick syndrome 4/6/2017    Hereditary peripheral neuropathy 11/15/2016    History of cardioversion 4/18/2017    S/P Synchronized external cardioversion (4/18/2017 - Dr. Anand Massey)    History of vitamin D deficiency 4/22/2017    Vitamin D 25-Hydroxy (4/22/2017) = 12.0; Vitamin D 25-Hydroxy (5/26/2017) = 38.7    Infection of vascular bypass graft ([facility] 7/24/2017    Left lower extremity    Ischemic cardiomyopathy     Moderate to severe pulmonary hypertension ([facility] 7/23/2017    Paroxysmal atrial fibrillation (HCC)     Peripheral artery disease ([facility] 1/25/2017    Skin ulcer of malleolar area of right ankle ([facility]     Spinal stenosis of lumbar region with radiculopathy 5/4/2015    Dr. Paul Cárdenas      Patient taking anticoagulants Heparin   Patient has a defibrillator: no    Assessment:   Changes in Assessment throughout shift:no change in assessment    Patient has central line:yes Reasons if yes: long term antibiotic therapy Insertion date:8/24/17 Last dressing date:8/24/17   Patient has Jiang Cath:no Reasons if yes:na  Insertion date:na     Last Vitals:     Vitals:    08/26/17 0923 08/26/17 1725 08/26/17 2100 08/27/17 0553   BP: 159/77 149/81 135/64 128/66   Pulse:  61 62 68   Resp:  20 18    Temp:  97.8 °F (36.6 °C) 98.1 °F (36.7 °C)    SpO2:  100% 100%    Weight:       Height:            PAIN    Pain Assessment    Pain Intensity 1: 0 (08/27/17 0553) Pain Intensity 1: 2 (12/29/14 1105)    Pain Location 1: Leg Pain Location 1: Abdomen    Pain Intervention(s) 1: Medication (see MAR) Pain Intervention(s) 1: Medication (see MAR)  Patient Stated Pain Goal: 0 Patient Stated Pain Goal: 0  o Intervention effective: yes  o Other actions taken for pain: repositioned     Skin Assessment  Skin color Skin Color: Appropriate for ethnicity  Condition/Temperature Skin Condition/Temp: Cool, Dry  Integrity Skin Integrity: Incision (comment) (left AKA. R groin,R abdomen)  Turgor Turgor: Non-tenting  Weekly Pressure Ulcer Documentation  Pressure  Injury Documentation: No Pressure Injury Noted-Pressure Ulcer Prevention Initiated  Wound Prevention & Protection Methods  Orientation of wound Orientation of Wound Prevention: Posterior  Location of Prevention Location of Wound Prevention: Sacrum/Coccyx  Dressing Present Dressing Present : No  Dressing Status    Wound Offloading Wound Offloading (Prevention Methods): Bed, pressure redistribution/air     INTAKE/OUPUT    Date 08/26/17 0700 - 08/27/17 0659 08/27/17 0700 - 08/28/17 0659   Shift 0700-1859 5144-5510 24 Hour Total 0700-1859 1900-0659 24 Hour Total   I  N  T  A  K  E   Shift Total  (mL/kg)         O  U  T  P  U  T   Urine  (mL/kg/hr)  1075  (1.3) 1075  (0.6)         Urine Voided  1075 1075         Urine Occurrence(s) 6 x 2 x 8 x       Stool  0 0         Stool Occurrence(s) 0 x 0 x 0 x         Stool  0 0       Shift Total  (mL/kg)  1075  (15.2) 1075  (15.2)      NET  -1075 -1075      Weight (kg) 70.8 70.8 70.8 70.8 70.8 70.8       Recommendations:  1. Patient needs and requests: urinal emptied ,repositioned    2. Diet: Cardiac     3. Pending tests/procedures: labs    4. Functional Level/Equipment: assist x 1 / w/c    5. Estimated Discharge Date: tbd Posted on Whiteboard in Patients Room: [facility] Safety Check    A safety check occurred in the patient's room between off going nurse and oncoming nurse listed above. The safety check included the below items  Area Items   H  High Alert Medications - Verify all high alert medication drips (heparin, PCA, etc.)   E  Equipment - Suction is set up for ALL patients (with yanker)  - Red plugs utilized for all equipment (IV pumps, etc.)  - WOWs wiped down at end of shift.  - Room stocked with oxygen, suction, and other unit-specific supplies   A  Alarms - Bed alarm is set for fall risk patients  - Ensure chair alarm is in place and activated if patient is up in a chair   L  Lines - Check IV for any infiltration  - Jiang bag is empty if patient has a Jiang   - Tubing and IV bags are labeled   S  Safety   - Room is clean, patient is clean, and equipment is clean. - Hallways are clear from equipment besides carts.    - Fall bracelet on for fall risk patients  - Ensure room is clear and free of clutter  - Suction is set up for ALL patients (with cailin)  - Hallways are clear from equipment besides carts.    - Isolation precautions followed, supplies available outside room, sign posted

## 2017-08-27 NOTE — PROGRESS NOTES
[address]  [address] [place] [address], [address]     INPATIENT REHABILITATION  DAILY PROGRESS NOTE     Date: 8/27/2017    Name: Jase Garcia Age / Sex: 59 y.o. / male   CSN: [id number] MRN: [id number]   [facility] Date: 8/25/2017 Length of Stay: 2 days     Primary Rehab Diagnosis: Impaired Mobility and ADLs secondary to:  1. S/P Left above-the-knee amputation (8/18/2017 - Dr. Estelle Brannon)  3. History of ischemic rest pain of the left lower extremity due to severe peripheral vascular disease       Subjective:     No new issues or problems reported. Blood pressure controlled. Carvedilol was not given due to HR parameters.       Objective:     Vital Signs:  Patient Vitals for the past 24 hrs:   BP Temp Pulse Resp SpO2   08/27/17 0823 157/73 97.1 °F (36.2 °C) (!) 56 18 98 %   08/27/17 0553 128/66 - 68 - -   08/26/17 2100 135/64 98.1 °F (36.7 °C) 62 18 100 %   08/26/17 1725 149/81 97.8 °F (36.6 °C) 61 20 100 %        Current Medications:  Current Facility-Administered Medications   Medication Dose Route Frequency    alteplase (CATHFLO) 1 mg in sterile water (preservative free) 1 mL injection  1 mg InterCATHeter PRN    losartan (COZAAR) tablet 100 mg  100 mg Oral DAILY    acetaminophen (TYLENOL) tablet 650 mg  650 mg Oral Q4H PRN    docusate sodium (COLACE) capsule 100 mg  100 mg Oral BID    bisacodyl (DULCOLAX) tablet 10 mg  10 mg Oral Q48H PRN    heparin (porcine) injection 5,000 Units  5,000 Units SubCUTAneous Q8H    lactobacillus sp. 50 billion cpu (BIO-K PLUS) capsule 1 Cap  1 Cap Oral DAILY    magnesium oxide (MAG-OX) tablet 400 mg  400 mg Oral DAILY    gabapentin (NEURONTIN) capsule 300 mg  300 mg Oral Q8H    furosemide (LASIX) tablet 20 mg  20 mg Oral DAILY    HYDROmorphone (DILAUDID) tablet 2-4 mg  2-4 mg Oral Q4H PRN    potassium chloride (KLOR-CON) packet 20 mEq  20 mEq Oral DAILY    polyethylene glycol (MIRALAX) packet 17 g  17 g Oral DAILY    amiodarone (CORDARONE) tablet 200 mg  200 mg Oral DAILY    aspirin chewable tablet 81 mg  81 mg Oral DAILY WITH BREAKFAST    cholecalciferol (VITAMIN D3) tablet 2,000 Units  2,000 Units Oral DAILY    ferrous sulfate tablet 325 mg  1 Tab Oral DAILY WITH BREAKFAST    ascorbic acid (vitamin C) (VITAMIN C) tablet 250 mg  250 mg Oral DAILY WITH BREAKFAST    zinc sulfate (ZINCATE) capsule 220 mg  1 Cap Oral DAILY    atorvastatin (LIPITOR) tablet 40 mg  40 mg Oral QHS    metoprolol tartrate (LOPRESSOR) tablet 25 mg  25 mg Oral Q12H    cyanocobalamin tablet 1,000 mcg  1,000 mcg Oral DAILY    WARFARIN INFORMATION NOTE (COUMADIN)   Other Rx Dosing/Monitoring    ampicillin-sulbactam (UNASYN) 3 g in 0.9% sodium chloride (MBP/ADV) 100 mL MBP  3 g IntraVENous Q6H       Allergies: Allergies   Allergen Reactions    Morphine Other (comments)     Patient gets confused with morphine. Lab/Data Review:  Recent Results (from the past 24 hour(s))   PROTHROMBIN TIME + INR    Collection Time: 08/27/17  6:45 AM   Result Value Ref Range    Prothrombin time 15.5 (H) 11.5 - 15.2 sec    INR 1.3 (H) 0.8 - 1.2         Estimated Glomerular Filtration Rate:  On admission, estimated GFR based on a Creatinine of 0.62 mg/dl:   Using CKD-EPI = 108.6 mL/min/1.73m2   Using MDRD = 141.1 mL/min/1.73m2      Assessment:     Primary Rehabilitation Diagnosis  1. Impaired Mobility and ADLs  2. S/P Left above-the-knee amputation (8/18/2017 - Dr. Purcell Postal)  3.  History of ischemic rest pain of the left lower extremity due to severe peripheral vascular disease     Comorbidities   Benign hypertensive heart disease with systolic congestive heart failure, NYHA class 2  I11.0, I50.20    DDD (degenerative disc disease), lumbar M51.36    Erectile dysfunction N52.9    Spinal stenosis of lumbar region with radiculopathy M48.06, M54.16    Hereditary peripheral neuropathy G60.9    Aortoiliac occlusive disease  I74.09    Atherosclerosis of native artery of both lower extremities with intermittent claudication  I70.213    Peripheral artery disease  I73.9    Coronary artery disease involving native coronary artery of native heart I25.10    Paroxysmal atrial fibrillation  I48.0    Acute blood loss as cause of postoperative anemia D62    Anticoagulated on Coumadin Z51.81, Z79.01    Ischemic cardiomyopathy I25.5    Chronic systolic heart failure  D55.47    Carotid artery disease  I77.9    Dyslipidemia E78.5    Euthyroid sick syndrome E07.81    Aftercare following surgery of the circulatory system Z48.812    Status post femoral-popliteal bypass surgery Z95.828    History of cardioversion Z98.890    Critical ischemia of right lower extremity I99.8    History of vitamin D deficiency Z86.39    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    Infection of vascular bypass graft  T82. 7XXA    Chronic anemia D64.9    Chronic ulcer of right heel (HCC) L97.419    Prolonged Q-T interval on ECG R94.31    Aftercare for amputation stump Z47.81    Moderate to severe pulmonary hypertension  I27.2    Adverse effect of amiodarone T46.2X5A    Skin ulcer of malleolar area of right ankle  L97.319         Plan:     1. Justification for continued stay: Good progression towards established rehabilitation goals. 2. Medical Issues being followed closely:    [x]  Fall and safety precautions     []  Wound Care     [x]  Bowel and Bladder Function     [x]  Fluid Electrolyte and Nutrition Balance     []  Pain Control      3. Issues that 24 hour rehabilitation nursing is following:    [x]  Fall and safety precautions     []  Wound Care     [x]  Bowel and Bladder Function     [x]  Fluid Electrolyte and Nutrition Balance     []  Pain Control      [x]  Assistance with and education on in-room safety with transfers to and from the bed, wheelchair, toilet and shower. 4. Acute rehabilitation plan of care:    [x]  Continue current care and rehab.            [x]  Physical Therapy           [x]  Occupational Therapy           []  Speech Therapy     []  Hold Rehab until further notice     5. Medications:    [x]  MAR Reviewed     [x]  Continue Present Medications     6. DVT Prophylaxis:      []  Lovenox     []  Arixtra       []  Unfractionated Heparin     []  Coumadin     []  NOAC     [x]  VASQUEZ Stockings     []  Sequential Compression Device     []  None     7. Orders:   > S/P Left above-the-knee amputation (8/18/2017 - Dr. Garrett Pompa); History of ischemic rest pain of the left lower extremity due to severe peripheral artery disease   > Wound vac dressing changes by staff nurses every Monday, Wednesday, & Friday on day shift. Measure wound size & document with each dressing change. Settings: 125mmHG low continuous suction. Measure wound size & document with each dressing change. Change canister when 3/4 full. May use saline dressings daily until wound vac initiated.   > Mepilex Border dressings to left AKA staple line q2days & prn soilage. Wrap with ace wrap in figure 8 form.   > Staples to be removed on 9/15/2017    > S/P Removal of infected graft; Repair of left superficial femoral artery; Repair of left common femoral artery (7/25/2017 - Dr. 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   > Continue Ampicillin Sulbactam 3 grams IVPB q 6 hr (STOP DATE: 9/19/2017)    > Acute Postoperative Blood Loss Anemia   > Hgb/Hct (8/24/2017, prior to admission to the ARU) = 8.4/25.6   > Anemia work-up (8/22/2017) showed serum iron 15, TIBC 146, iron % saturation 10   > Hgb/Hct (8/26/2017, on admission) = 8.6/26.5   > Reticulocyte count (8/26/2017) = 4.7   > Ferritin (8/26/2017) = 1066   > Continue:    > FeSO4 325 mg PO once daily with breakfast     > Ascorbic Acid 250 mg PO once daily with breakfast (to enhance the absorption of the FeSO4)     > Benign hypertensive heart disease with chronic systolic heart failure   > On 8/26/2017, increased Losartan from 50 mg to 100 mg PO once daily (6AM)   > Continue:    > Furosemide 20 mg PO once daily (6AM)    > Losartan 100 mg PO once daily (6AM)    > Metoprolol tartrate 25 mg PO q 12 hr (9AM, 9PM)    > Paroxysmal atrial fibrillation, presently normal sinus rhythm, anticoagulated on Coumadin   > Amiodarone 200 mg PO once daily   > Metoprolol tartrate 25 mg PO q 12 hr (9AM, 9PM)   > Heparin 5,000 units SC q 8 hr until INR is greater than 2.0 for 2 determinations   > Target INR = 2 to 3   > INR 1.3   > Patient received Coumadin 7.5 mg PO last night   > Coumadin 7.5 mg PO tonight    > Chronic ulcer of right heel    > Apply rigid heel suspension boots on both feet when supine in bed    > Skin ulcer of malleolar area of right ankle   > Cleanse wound with saline, apply Aquacel ag dressing, Mepilex Border to right lateral ankle wound every 48hrs & prn soilage.     > Analgesia   > Continue:    > Acetaminophen 650 mg PO q 4 hr PRN for pain level less than 5/10    > Hydromorphone 2-4 mg PO q 4 hr PRN for pain level greater than 4/10      Signed:    Nahomy Castle MD    August 27, 2017

## 2017-08-28 ENCOUNTER — TELEPHONE (OUTPATIENT)
Dept: VASCULAR SURGERY | Age: 57
End: 2017-08-28

## 2017-08-28 LAB
ANION GAP SERPL CALC-SCNC: 9 MMOL/L (ref 3–18)
BUN SERPL-MCNC: 9 MG/DL (ref 7–18)
BUN/CREAT SERPL: 15 (ref 12–20)
CALCIUM SERPL-MCNC: 9.2 MG/DL (ref 8.5–10.1)
CHLORIDE SERPL-SCNC: 101 MMOL/L (ref 100–108)
CO2 SERPL-SCNC: 25 MMOL/L (ref 21–32)
CREAT SERPL-MCNC: 0.6 MG/DL (ref 0.6–1.3)
GLUCOSE SERPL-MCNC: 88 MG/DL (ref 74–99)
HCT VFR BLD AUTO: 27.9 % (ref 36–48)
HGB BLD-MCNC: 9 G/DL (ref 13–16)
INR PPP: 1.6 (ref 0.8–1.2)
PLATELET # BLD AUTO: 763 K/UL (ref 135–420)
POTASSIUM SERPL-SCNC: 4.5 MMOL/L (ref 3.5–5.5)
PROTHROMBIN TIME: 18.8 SEC (ref 11.5–15.2)
SODIUM SERPL-SCNC: 135 MMOL/L (ref 136–145)

## 2017-08-28 PROCEDURE — 85049 AUTOMATED PLATELET COUNT: CPT | Performed by: INTERNAL MEDICINE

## 2017-08-28 PROCEDURE — 97530 THERAPEUTIC ACTIVITIES: CPT

## 2017-08-28 PROCEDURE — 85018 HEMOGLOBIN: CPT | Performed by: INTERNAL MEDICINE

## 2017-08-28 PROCEDURE — 74011000258 HC RX REV CODE- 258: Performed by: INTERNAL MEDICINE

## 2017-08-28 PROCEDURE — 85610 PROTHROMBIN TIME: CPT | Performed by: INTERNAL MEDICINE

## 2017-08-28 PROCEDURE — 97116 GAIT TRAINING THERAPY: CPT

## 2017-08-28 PROCEDURE — 74011250636 HC RX REV CODE- 250/636: Performed by: INTERNAL MEDICINE

## 2017-08-28 PROCEDURE — 74011250637 HC RX REV CODE- 250/637: Performed by: INTERNAL MEDICINE

## 2017-08-28 PROCEDURE — 97110 THERAPEUTIC EXERCISES: CPT

## 2017-08-28 PROCEDURE — 65310000000 HC RM PRIVATE REHAB

## 2017-08-28 PROCEDURE — 80048 BASIC METABOLIC PNL TOTAL CA: CPT | Performed by: INTERNAL MEDICINE

## 2017-08-28 PROCEDURE — 77030011256 HC DRSG MEPILEX <16IN NO BORD MOLN -A

## 2017-08-28 RX ORDER — WARFARIN 4 MG/1
4 TABLET ORAL ONCE
Status: COMPLETED | OUTPATIENT
Start: 2017-08-28 | End: 2017-08-28

## 2017-08-28 RX ADMIN — HYDROMORPHONE HYDROCHLORIDE 4 MG: 2 TABLET ORAL at 19:11

## 2017-08-28 RX ADMIN — VITAMIN D, TAB 1000IU (100/BT) 2000 UNITS: 25 TAB at 09:30

## 2017-08-28 RX ADMIN — WARFARIN SODIUM 4 MG: 4 TABLET ORAL at 19:04

## 2017-08-28 RX ADMIN — Medication 250 MG: at 09:31

## 2017-08-28 RX ADMIN — Medication 325 MG: at 09:30

## 2017-08-28 RX ADMIN — DOCUSATE SODIUM 100 MG: 100 CAPSULE, LIQUID FILLED ORAL at 09:30

## 2017-08-28 RX ADMIN — AMPICILLIN AND SULBACTAM 3 G: 1; 2 INJECTION, POWDER, FOR SOLUTION INTRAMUSCULAR; INTRAVENOUS at 09:31

## 2017-08-28 RX ADMIN — FUROSEMIDE 20 MG: 20 TABLET ORAL at 09:30

## 2017-08-28 RX ADMIN — HEPARIN SODIUM 5000 UNITS: 5000 INJECTION, SOLUTION INTRAVENOUS; SUBCUTANEOUS at 05:17

## 2017-08-28 RX ADMIN — Medication 1 CAPSULE: at 09:31

## 2017-08-28 RX ADMIN — CYANOCOBALAMIN TAB 1000 MCG 1000 MCG: 1000 TAB at 09:30

## 2017-08-28 RX ADMIN — HEPARIN SODIUM 5000 UNITS: 5000 INJECTION, SOLUTION INTRAVENOUS; SUBCUTANEOUS at 13:24

## 2017-08-28 RX ADMIN — AMIODARONE HYDROCHLORIDE 200 MG: 200 TABLET ORAL at 09:30

## 2017-08-28 RX ADMIN — GABAPENTIN 300 MG: 300 CAPSULE ORAL at 05:17

## 2017-08-28 RX ADMIN — METOPROLOL TARTRATE 25 MG: 25 TABLET, FILM COATED ORAL at 09:30

## 2017-08-28 RX ADMIN — POTASSIUM CHLORIDE 20 MEQ: 1.5 POWDER, FOR SOLUTION ORAL at 09:30

## 2017-08-28 RX ADMIN — GABAPENTIN 300 MG: 300 CAPSULE ORAL at 13:24

## 2017-08-28 RX ADMIN — HYDROMORPHONE HYDROCHLORIDE 4 MG: 2 TABLET ORAL at 01:16

## 2017-08-28 RX ADMIN — POLYETHYLENE GLYCOL 3350 17 G: 17 POWDER, FOR SOLUTION ORAL at 19:04

## 2017-08-28 RX ADMIN — ZINC SULFATE 220 MG (50 MG) CAPSULE 220 MG: CAPSULE at 09:30

## 2017-08-28 RX ADMIN — AMPICILLIN AND SULBACTAM 3 G: 1; 2 INJECTION, POWDER, FOR SOLUTION INTRAMUSCULAR; INTRAVENOUS at 04:00

## 2017-08-28 RX ADMIN — GABAPENTIN 300 MG: 300 CAPSULE ORAL at 21:26

## 2017-08-28 RX ADMIN — Medication 400 MG: at 09:30

## 2017-08-28 RX ADMIN — AMPICILLIN AND SULBACTAM 3 G: 1; 2 INJECTION, POWDER, FOR SOLUTION INTRAMUSCULAR; INTRAVENOUS at 19:03

## 2017-08-28 RX ADMIN — ASPIRIN 81 MG 81 MG: 81 TABLET ORAL at 09:30

## 2017-08-28 RX ADMIN — ATORVASTATIN CALCIUM 40 MG: 40 TABLET, FILM COATED ORAL at 21:26

## 2017-08-28 RX ADMIN — METOPROLOL TARTRATE 25 MG: 25 TABLET, FILM COATED ORAL at 21:26

## 2017-08-28 RX ADMIN — LOSARTAN POTASSIUM 100 MG: 50 TABLET ORAL at 05:17

## 2017-08-28 RX ADMIN — DOCUSATE SODIUM 100 MG: 100 CAPSULE, LIQUID FILLED ORAL at 19:04

## 2017-08-28 RX ADMIN — HEPARIN SODIUM 5000 UNITS: 5000 INJECTION, SOLUTION INTRAVENOUS; SUBCUTANEOUS at 21:26

## 2017-08-28 NOTE — ROUTINE PROCESS
0800 Pt. Awake sitting up in bed no change in assessment no signs of distress pt. Reported to be feeling fine. Wound vac dressing intact. 0930 Pt. Sitting up in chair eating breakfast.  1200 Pt. With physical therapy. 1330 able to transfer by wheelchair. 1500 no change in assessment. 1800 Pt. Sitting up in chair eating dinner.

## 2017-08-28 NOTE — PROGRESS NOTES
[id number]  [address] [place] [address], [address]     INPATIENT REHABILITATION  DAILY PROGRESS NOTE     Date: 8/28/2017    Name: Teresa Curiel Age / Sex: 59 y.o. / male   CSN: [id number] MRN: [id number]   [facility] Date: 8/25/2017 Length of Stay: 3 days     Primary Rehab Diagnosis: Impaired Mobility and ADLs secondary to:  1. S/P Left above-the-knee amputation (8/18/2017 - Dr. Cait Garcia)  3. History of ischemic rest pain of the left lower extremity due to severe peripheral vascular disease       Subjective:     Patient seen and examined. Blood pressure controlled. Patient's Complaint:   No significant medical complaints    Pain Control: stable, mild-to-moderate joint symptoms intermittently, reasonably well controlled by current meds      Objective:     Vital Signs:  Patient Vitals for the past 24 hrs:   BP Temp Pulse Resp SpO2   08/28/17 1015 149/65 97.4 °F (36.3 °C) 60 18 100 %   08/28/17 0517 140/72 - 70 - -   08/27/17 2229 152/76 97.9 °F (36.6 °C) 72 18 100 %        Physical Examination:  GENERAL SURVEY: Patient is awake, alert, oriented x 3, sitting comfortably on the chair, not in acute respiratory distress.   HEENT: pale palpebral conjunctivae, anicteric sclerae, no nasoaural discharge, moist oral mucosa  NECK: supple, no jugular venous distention, no palpable lymph nodes  CHEST/LUNGS: symmetrical chest expansion, good air entry, clear breath sounds  HEART: adynamic precordium, good S1 S2, no S3, regular rhythm, no murmurs  ABDOMEN: flat, bowel sounds appreciated, soft, non-tender  EXTREMITIES: (+) left AKA stump with dressing, (+) WoundVac on left medial thigh, (+) ~1 cm x ~1 cm ulcer on right heel, (+) 1.5 cm x 1.5 cm wound above lateral malleolus of right ankle, pale nailbeds, no edema, full and equal pulses, no calf tenderness   NEUROLOGICAL EXAM: The patient is awake, alert and oriented x3, able to answer questions fairly appropriately, able to follow 1 and 2 step commands. Able to tell time from the wall clock. Cranial nerves II-XII are grossly intact. No gross sensory deficit.   Motor strength is 4+/5 on BUE, 4+/5 on the RLE, 3+/5 on the left hip, 4-/5 on the left knee and left ankle.     Incision(s): healing well, clean, dry, and intact       Current Medications:  Current Facility-Administered Medications   Medication Dose Route Frequency    alteplase (CATHFLO) 1 mg in sterile water (preservative free) 1 mL injection  1 mg InterCATHeter PRN    losartan (COZAAR) tablet 100 mg  100 mg Oral DAILY    acetaminophen (TYLENOL) tablet 650 mg  650 mg Oral Q4H PRN    docusate sodium (COLACE) capsule 100 mg  100 mg Oral BID    bisacodyl (DULCOLAX) tablet 10 mg  10 mg Oral Q48H PRN    heparin (porcine) injection 5,000 Units  5,000 Units SubCUTAneous Q8H    lactobacillus sp. 50 billion cpu (BIO-K PLUS) capsule 1 Cap  1 Cap Oral DAILY    magnesium oxide (MAG-OX) tablet 400 mg  400 mg Oral DAILY    gabapentin (NEURONTIN) capsule 300 mg  300 mg Oral Q8H    furosemide (LASIX) tablet 20 mg  20 mg Oral DAILY    HYDROmorphone (DILAUDID) tablet 2-4 mg  2-4 mg Oral Q4H PRN    potassium chloride (KLOR-CON) packet 20 mEq  20 mEq Oral DAILY    polyethylene glycol (MIRALAX) packet 17 g  17 g Oral DAILY    amiodarone (CORDARONE) tablet 200 mg  200 mg Oral DAILY    aspirin chewable tablet 81 mg  81 mg Oral DAILY WITH BREAKFAST    cholecalciferol (VITAMIN D3) tablet 2,000 Units  2,000 Units Oral DAILY    ferrous sulfate tablet 325 mg  1 Tab Oral DAILY WITH BREAKFAST    ascorbic acid (vitamin C) (VITAMIN C) tablet 250 mg  250 mg Oral DAILY WITH BREAKFAST    zinc sulfate (ZINCATE) capsule 220 mg  1 Cap Oral DAILY    atorvastatin (LIPITOR) tablet 40 mg  40 mg Oral QHS    metoprolol tartrate (LOPRESSOR) tablet 25 mg  25 mg Oral Q12H    cyanocobalamin tablet 1,000 mcg  1,000 mcg Oral DAILY    WARFARIN INFORMATION NOTE (COUMADIN)   Other Rx Dosing/Monitoring    ampicillin-sulbactam (UNASYN) 3 g in 0.9% sodium chloride (MBP/ADV) 100 mL MBP  3 g IntraVENous Q6H       Allergies: Allergies   Allergen Reactions    Morphine Other (comments)     Patient gets confused with morphine. Lab/Data Review:  Recent Results (from the past 24 hour(s))   METABOLIC PANEL, BASIC    Collection Time: 08/28/17  6:30 AM   Result Value Ref Range    Sodium 135 (L) 136 - 145 mmol/L    Potassium 4.5 3.5 - 5.5 mmol/L    Chloride 101 100 - 108 mmol/L    CO2 25 21 - 32 mmol/L    Anion gap 9 3.0 - 18 mmol/L    Glucose 88 74 - 99 mg/dL    BUN 9 7.0 - 18 MG/DL    Creatinine 0.60 0.6 - 1.3 MG/DL    BUN/Creatinine ratio 15 12 - 20      GFR est AA >60 >60 ml/min/1.73m2    GFR est non-AA >60 >60 ml/min/1.73m2    Calcium 9.2 8.5 - 10.1 MG/DL   HGB & HCT    Collection Time: 08/28/17  6:30 AM   Result Value Ref Range    HGB 9.0 (L) 13.0 - 16.0 g/dL    HCT 27.9 (L) 36.0 - 48.0 %   PROTHROMBIN TIME + INR    Collection Time: 08/28/17  6:30 AM   Result Value Ref Range    Prothrombin time 18.8 (H) 11.5 - 15.2 sec    INR 1.6 (H) 0.8 - 1.2     PLATELET COUNT    Collection Time: 08/28/17  6:30 AM   Result Value Ref Range    PLATELET 166 (H) 527 - 420 K/uL       Estimated Glomerular Filtration Rate:  On admission, estimated GFR based on a Creatinine of 0.62 mg/dl:   Using CKD-EPI = 108.6 mL/min/1.73m2   Using MDRD = 141.1 mL/min/1.73m2  Most recent estimated GFR, based on a Creatinine of 0.60 mg/dl on 8/28/2017:   Using CKD-EPI = 110.1 mL/min/1.73m2   Using MDRD = 146.6 mL/min/1.73m2       Assessment:     Primary Rehabilitation Diagnosis  1. Impaired Mobility and ADLs  2. S/P Left above-the-knee amputation (8/18/2017 - Dr. Andriette Trenton)  3.  History of ischemic rest pain of the left lower extremity due to severe peripheral vascular disease      Comorbidities   Benign hypertensive heart disease with systolic congestive heart failure, NYHA class 2  I11.0, I50.20    DDD (degenerative disc disease), lumbar M51.36    Erectile dysfunction N52.9    Spinal stenosis of lumbar region with radiculopathy M48.06, M54.16    Hereditary peripheral neuropathy G60.9    Aortoiliac occlusive disease  I74.09    Atherosclerosis of native artery of both lower extremities with intermittent claudication  I70.213    Peripheral artery disease  I73.9    Coronary artery disease involving native coronary artery of native heart I25.10    Paroxysmal atrial fibrillation  I48.0    Acute blood loss as cause of postoperative anemia D62    Anticoagulated on Coumadin Z51.81, Z79.01    Ischemic cardiomyopathy I25.5    Chronic systolic heart failure  P06.46    Carotid artery disease  I77.9    Dyslipidemia E78.5    Euthyroid sick syndrome E07.81    Aftercare following surgery of the circulatory system Z48.812    Status post femoral-popliteal bypass surgery Z95.828    History of cardioversion Z98.890    Critical ischemia of right lower extremity I99.8    History of vitamin D deficiency Z86.39    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    Infection of vascular bypass graft  T82. 7XXA    Chronic anemia D64.9    Chronic ulcer of right heel (HCC) L97.419    Prolonged Q-T interval on ECG R94.31    Aftercare for amputation stump Z47.81    Moderate to severe pulmonary hypertension  I27.2    Adverse effect of amiodarone T46.2X5A    Skin ulcer of malleolar area of right ankle  L97.319         Plan:     1. Justification for continued stay: Good progression towards established rehabilitation goals. 2. Medical Issues being followed closely:    [x]  Fall and safety precautions     [x]  Wound Care     [x]  Bowel and Bladder Function     [x]  Fluid Electrolyte and Nutrition Balance     [x]  Pain Control      3.  Issues that 24 hour rehabilitation nursing is following:    [x]  Fall and safety precautions     [x]  Wound Care     [x]  Bowel and Bladder Function     [x]  Fluid Electrolyte and Nutrition Balance     [x]  Pain Control      [x] Assistance with and education on in-room safety with transfers to and from the bed, wheelchair, toilet and shower. 4. Acute rehabilitation plan of care:    [x]  Continue current care and rehab. [x]  Physical Therapy           [x]  Occupational Therapy           []  Speech Therapy     []  Hold Rehab until further notice     5. Medications:    [x]  MAR Reviewed     [x]  Continue Present Medications     6. DVT Prophylaxis:      []  Lovenox     []  Unfractionated Heparin     [x]  Coumadin     []  NOAC     []  VASQUEZ Stockings     []  Sequential Compression Device     []  None     7. Orders:   > S/P Left above-the-knee amputation (8/18/2017 - Dr. Jono Ruiz); History of ischemic rest pain of the left lower extremity due to severe peripheral artery disease   > Wound vac dressing changes by staff nurses every Monday, Wednesday, & Friday on day shift. Measure wound size & document with each dressing change. Settings: 125mmHG low continuous suction. Measure wound size & document with each dressing change. Change canister when 3/4 full. May use saline dressings daily until wound vac initiated.   > Mepilex Border dressings to left AKA staple line q2days & prn soilage. Wrap with ace wrap in figure 8 form.   > Staples to be removed on 9/15/2017    > S/P Removal of infected graft; Repair of left superficial femoral artery; Repair of left common femoral artery (7/25/2017 - Dr. Jono Ruiz);  History of sepsis associated with infection of vascular bypass graft   > Continue Ampicillin Sulbactam 3 grams IVPB q 6 hr (STOP DATE: 9/19/2017)    > Acute Postoperative Blood Loss Anemia   > Hgb/Hct (8/24/2017, prior to admission to the ARU) = 8.4/25.6   > Anemia work-up (8/22/2017) showed serum iron 15, TIBC 146, iron % saturation 10   > Hgb/Hct (8/26/2017, on admission) = 8.6/26.5   > Reticulocyte count (8/26/2017) = 4.7   > Ferritin (8/26/2017) = 1066   > Hgb/Hct (8/28/2017) = 9.0/27.9    > Continue:    > FeSO4 325 mg PO once daily with breakfast     > Ascorbic Acid 250 mg PO once daily with breakfast (to enhance the absorption of the FeSO4)     > Benign hypertensive heart disease with chronic systolic heart failure   > On 8/26/2017, increased Losartan from 50 mg to 100 mg PO once daily (6AM)   > Continue:    > Furosemide 20 mg PO once daily (6AM)    > Losartan 100 mg PO once daily (6AM)    > Metoprolol tartrate 25 mg PO q 12 hr (9AM, 9PM)    > Paroxysmal atrial fibrillation, presently normal sinus rhythm, anticoagulated on Coumadin   > Amiodarone 200 mg PO once daily   > Metoprolol tartrate 25 mg PO q 12 hr (9AM, 9PM)   > Heparin 5,000 units SC q 8 hr until INR is greater than 2.0 for 2 determinations   > Target INR = 2 to 3   > INR 1.6   > Patient received Coumadin 7.5 mg PO last night   > Coumadin 4 mg PO tonight    > Chronic ulcer of right heel    > Apply rigid heel suspension boots on both feet when supine in bed    > Skin ulcer of malleolar area of right ankle   > Cleanse wound with saline, apply Aquacel ag dressing, Mepilex Border to right lateral ankle wound every 48hrs & prn soilage. > Analgesia   > Continue:    > Acetaminophen 650 mg PO q 4 hr PRN for pain level less than 5/10    > Hydromorphone 2-4 mg PO q 4 hr PRN for pain level greater than 4/10      8. Patient's progress in rehabilitation and medical issues discussed with the patient. All questions answered to the best of my ability. Care plan discussed with patient and nurse.       Bruce Samson MD    August 28, 2017

## 2017-08-28 NOTE — CONSULTS
[facility] PSYCHOLOGICAL SCREENING    Assessment Initiated:  August 28, 2017    Rehab Diagnosis:  Left AKA    Pertinent Physical/Psychiatric History:     Patient Active Problem List   Diagnosis Code    Benign hypertensive heart disease with systolic congestive heart failure, NYHA class 2 ([facility]) I11.0, I50.20    DDD (degenerative disc disease), lumbar M51.36    Erectile dysfunction N52.9    Spinal stenosis of lumbar region with radiculopathy M48.06, M54.16    Hereditary peripheral neuropathy G60.9    Aortoiliac occlusive disease ([facility] I74.09    Atherosclerosis of native artery of both lower extremities with intermittent claudication ([facility] I70.213    Peripheral artery disease ([facility] I73.9    Coronary artery disease involving native coronary artery of native heart I25.10    Paroxysmal atrial fibrillation ([facility]) I48.0    Acute blood loss as cause of postoperative anemia D62    Impaired mobility and ADLs Z74.09    Anticoagulated on Coumadin Z51.81, Z79.01    Ischemic cardiomyopathy I25.5    Chronic systolic heart failure ([facility]) I50.22    Carotid artery disease ([facility]) I77.9    Dyslipidemia E78.5    Euthyroid sick syndrome E07.81    Aftercare following surgery of the circulatory system Z48.812    Status post femoral-popliteal bypass surgery Z95.828    History of cardioversion Z98.890    Critical ischemia of lower extremity I99.8    History of vitamin D deficiency Z86.39    Pseudoaneurysm of femoral artery ([facility]) I72.4    Infection of vascular bypass graft ([facility] T82. 7XXA    Chronic anemia D64.9    Chronic ulcer of right heel ([facility]) L97.419    Ischemic rest pain of lower extremity ([facility]) I73.9    Prolonged Q-T interval on ECG R94.31    Status post above knee amputation of left lower extremity ([facility] B72.843    Aftercare for amputation stump Z47.81    Moderate to severe pulmonary hypertension ([facility]) I27.2    Adverse effect of amiodarone T46.2X5A    Skin ulcer of malleolar area of right ankle ([address] L97.319 Patient is not currently receiving any psychiatric services and takes no psychotropic medication on admit to ARU. Patient only recently (supposedly) stopped consuming alcohol, and acknowledges remote tobacco and marijuana use. OBJECTIVE  GENERAL OBSERVATIONS  Willingness to participate in program: [x] good   [] fair [] indifferent [] poor    General Appearance:  Patient observed casually and appropriately dressed and groomed and sitting upright in wheelchair in room and in no apparent distress. Patient's appearance noteworthy for a long-growth beard. Sensory Impairments:  Patient is very spontaneous and intelligible in his speech and responds appropriately to all inquiry. He can easily follow simple step direction, at least, as well as more complex instruction.     Evangelical Affiliation:  Unknown    Admission Assessment  Discharge Status   [x] alert  [] lethargic  [] difficult to arouse  [] fluctuating  [] other: Level of Consciousness [x] alert  [] lethargic  [] difficult to arouse  [] fluctuating  [] other:   [x] person  [x] place  [x] time  [x] situation Oriented [x] person  [x] place  [x] time  [x] situation   [x] within normal limits  [] impaired       [] mild        [] moderate        [] severe Attention [x] within normal limits  [] impaired       [] mild        [] moderate        [] severe   [x] within normal limits  [] impaired       [] mild        [] moderate        [] severe Memory [x] within normal limits  [] impaired       [] mild        [] moderate        [] severe   [x] appropriate to situation  [] depressed  [] anxious  [] angry   [] fearful  [] emotionally labile  [] other:  Mood [x] appropriate to situation  [] depressed  [] anxious  [] angry   [] fearful  [] emotionally labile  [] other:   [x] appropriate  [] flat  [] inappropriate to content of speech Affect [x] appropriate  [] flat  [] inappropriate to content of speech   [x] appropriate  [] aggressive/agitated  [] withdrawn  [] inappropriate  [] other: Behavior [x] appropriate  [] aggressive/agitated  [] withdrawn  [] inappropriate  [] other:   [] good  [x] limited  [] denial  [] none Insight Into Illness [x] good  [] limited  [] denial  [] none   [x] intact  [] impaired       [] mild        [] moderate        [] severe       Describe:  Cognition [x] intact  [] impaired       [] mild        [] moderate        [] severe       Describe:    [x] coping  [] demonstrates poor adjustment  [] undetermined       As evidenced by:  Patient Adjustment to Disability [x] coping  [] demonstrates poor adjustment  [] undetermined       As evidenced by:    [x] coping  [] demonstrates poor adjustment  [] undetermined      As evidenced by: Patient has support of his daughter in [place]. Family Adjustment to Disability [x] coping  [] demonstrates poor adjustment  [] undetermined      As evidenced by: Patient will reside temporarily with his daughter in [place]. ASSESSMENT  Clinical Impression:  Patient is a 59year old, , retired (),  male who has recently resided in a trailer in [place] but will temporarily be with his daughter in her [place] residence post hospital.  Afterwards, he now reports that a residence in [place] is being prepared for him and he seems eager to return to independent living, as he is able. In fact, he described looking forward to having a prosthesis for gait. Patient was recently on ARU for therapy services and had to transfer acute when it was decided that he would require amputation. Unfortunately, patient was experiencing significant discomfort and emotional distress in the days leading to his transfer off unit; however, he returns appearing determined and optimistic in his outlook - relieved that pain has been eliminated (although having phantom pain) but expecting a return to greater independence, again.   Because he has recent familiarity with treatment milieu and modalities, he will easily be able to integrate back into therapy with little need for support in this regard. Emotionally, patient seems very stable in mood and with appropriate, actually, sometimes euphoric affect. The latter seems to reflect his desire to simply \"get better and move on. \"  While this attitude and thinking will be helpful for him in his therapy effort, he will need further education to better understand the parameters of post extremity amputation and especially in regard to safety. Otherwise, as before, he denies any history of psychiatric services and he is not now requiring medication for mood stabilization. Patient will be monitored for emotional and/or behavioral difficulties while on ARU and encouraged to persevere in his therapy effort. Cognitively, patient is entirely alert and oriented. He is able to understand simple and multi step direction. He does not appear impulsive nor necessarily careless; however, he may sometimes be so determined to do for himself that he might not always request assistance as recommended. He will need reminding and reinforcement for same. Patient Strengths:  Alert, oriented, cooperative, motivated to improve and familiar with ARU from prior hospitalization. Patient Preferences:  Patient expects to be with his daughter in [place], immediately post hospital.    Rehab Potential:  Good    Educational Needs: Under each heading list the specific items in which the patient or family will need education/training.  Example: hip precautions, use of walker, ADL equipment, neglect, judgment, adjustment, etc.     Special considerations or accommodations for teaching:  [x] Yes     [] No     [] NA  If Yes, explain: Limited insight about recovery and safety issues Discharge Status    Completed Demonstrated/ Verbalized Understanding    Yes No Yes No   Info regarding disability: Limited insight [x] [] [x] []   Adjustment: Increased dependency [x] [] [x] []   Cognition:  [] [] [] [] Other: Safety [x] [] [x] []   Other: Loss issues [x] [] [x] []   If education not completed, explain: [] [] [] []     PLAN  Problem: Limited insight  Long Term Goal: Increase insight  Intervention: Patient education  At Discharge - LTG Achieved: [x] Yes [] No If not achieved, explain:    Problem: Increased dependency  Long Term Goal: Accept forced dependency  Intervention: Patient education and support   At Discharge - LTG Achieved: [x] Yes [] No If not achieved, explain:    Problem: Safety  Long Term Goal: Maximize safety awareness  Intervention: Patient education and reinforcement  At Discharge - LTG Achieved: [x] Yes [] No If not achieved, explain:    Problem: Loss issues  Long Term Goal: Identify any grief/loss issues post surgery  Intervention: Support  and grief work, as tolerated  At Discharge - LTG Achieved: [x] Yes [] No If not achieved, explain:    Problem:   Long Term Goal:   Intervention:   At Discharge - LTG Achieved: [] Yes [] No If not achieved, explain:    Concepcion Hernandez, THE [facility]  8/28/2017 10:42 AM    DISCHARGE STATUS    Clinical Impressions: Patient remained highly motivated to improve upon his return to ARU following amputation of LE and he feels wholly gratified by progress that he has made. Patient's mood on discharge is remarkably positive and he is able to maintain a hopeful outlook for himself; in fact, he does not express anything that suggests that he does not expect good, further recovery for himself. Fortunately, as well, he feels wholly supported by his daughter and her boyfriend and he will be able to remain with them, albeit temporarily, post hospital.  Patient is aware of all safety issues and encouraged to utilize safety and pace in his continued recovery effort.     Follow-up Services Recommended Purpose                 Concepcion Hernandez, PHD  Discharge Date/Time:

## 2017-08-28 NOTE — INTERDISCIPLINARY ROUNDS
[facility] PHYSICAL REHABILITATION  [address] [place] [address], [address]    INPATIENT REHABILITATION  PRE-TEAM CONFERENCE SUMMARY     Date of Conference: 8/29/2017    Name: Reagan Floyd Age / Sex: 59 y.o. / male   CSN: [id number] MRN: [id number]   [facility] Date: 8/25/2017 Length of Stay: 3 days     Primary Rehabilitation Diagnosis  1. Impaired Mobility and ADLs  2. S/P Left above-the-knee amputation (8/18/2017 - Dr. Jovanni Hightower)  3. History of ischemic rest pain of the left lower extremity due to severe peripheral vascular disease      Comorbidities   Benign hypertensive heart disease with systolic congestive heart failure, NYHA class 2  I11.0, I50.20    DDD (degenerative disc disease), lumbar M51.36    Erectile dysfunction N52.9    Spinal stenosis of lumbar region with radiculopathy M48.06, M54.16    Hereditary peripheral neuropathy G60.9    Aortoiliac occlusive disease  I74.09    Atherosclerosis of native artery of both lower extremities with intermittent claudication  I70.213    Peripheral artery disease  I73.9    Coronary artery disease involving native coronary artery of native heart I25.10    Paroxysmal atrial fibrillation  I48.0    Acute blood loss as cause of postoperative anemia D62    Anticoagulated on Coumadin Z51.81, Z79.01    Ischemic cardiomyopathy I25.5    Chronic systolic heart failure  A17.34    Carotid artery disease  I77.9    Dyslipidemia E78.5    Euthyroid sick syndrome E07.81    Aftercare following surgery of the circulatory system Z48.812    Status post femoral-popliteal bypass surgery Z95.828    History of cardioversion Z98.890    Critical ischemia of right lower extremity I99.8    History of vitamin D deficiency Z86.39    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    Infection of vascular bypass graft  T82. 7XXA    Chronic anemia D64.9    Chronic ulcer of right heel (HCC) L97.419    Prolonged Q-T interval on ECG R94.31    Aftercare for amputation stump Z47.81    Moderate to severe pulmonary hypertension  I27.2    Adverse effect of amiodarone T46.2X5A    Skin ulcer of malleolar area of right ankle  L97.319           Therapy:     FIM SCORES Initial Assessment Weekly Progress Assessment 8/28/2017   Eating Functional Level: 6  Comments: pt has dentures, independent with self feeding  6 mod independent   Swallowing     Grooming 7  7 independent   Bathing 4     4 min assist   Upper Body Dressing Functional Level: 7  Items Applied/Steps Completed: Pullover (4 steps)  7 independent   Lower Body Dressing Functional Level: 4  Items Applied/Steps Completed: Elastic waist pants (3 steps), Sock, right (1 step)  Comments: pt donned pants seated on edge of bed, assist to thread wound vac tube through pant leg, CGA to pullup pants  4 -min assist   Toileting Functional Level: 3  Comments: min assist to pull up clothing due to decreased balance  3 mod assist   Bladder - level of assist 4     Bladder - accident frequency score 6     Bowel - level of assist 3     Bowel - accident frequency score 4     Toilet Transfer [place] Toilet Transfer Score: 4  Comments: min assist stand pivot transfer from w/c to commode with grab bars or RW, assist due to decreased balance and decreased confidence with transfers   4- min assist   Tub/Shower Transfer [place] Tub or Shower Type: Tub/Shower combination  Tub/Shower Transfer Score: 4  Comments: min assist stand pivot to tub bench stand pivot transfer from w/c to tu     4- min assist   Comprehension Primary Mode of Comprehension: Auditory  Score: 6        Expression Primary Mode of Expression: Verbal  Score: 6        Social Interaction Score: 5     Problem Solving Score: 4     Memory Score: 4       FIM SCORES Initial Assessment Weekly Progress Assessment 8/28/2017   Bed/Chair/Wheelchair Transfers Transfer Type:  Other  Other: Stand-step with RW with CGA/SBA  Transfer Assistance : 4 (Contact guard assistance)  Sit to Stand Assistance: Contact guard assistance  Car Transfers: Not tested  Car Type: n/a Patient performs stand hop transfer with RW requiring verbal cueing for technique, pace, and CGA/Bob x 1 for balance assist during initial trial. Patient educated on improve COG control for STS and timing for transition of hand placement to improve understanding of proper technique. Patient performs technique x 5 trials following initial trial with close SBA for safety and cueing.     Bed Mobility Rolling Right 7 (Independent)   Rolling Left 7 (Independent)   Supine to Sit 7 (Independent)   Sit to Stand Contact guard assistance   Sit to Supine 7 (Independent)    Rolling Right   7   Rolling Left    7   Supine to Sit    7   Sit to Stand   CGA/SBA   Sit to Supine    7      Locomotion (W/C) Able to Propel (ft): 250 feet  Functional Level: 6  Curbs/Ramps Assist Required (FIM Score): 6 (Modified independent)  Wheelchair Setup Assist Required : 6 (Modified independent)  Wheelchair Management: Manages right brake, Manages left brake Function 250  Setup Assistance  6     Locomotion (W/C distance) 150 ft 250 ft   Locomotion (Walk) 4 (Contact guard assistance) 4      Locomotion (Walk dist.) 40 Feet (ft) (x2 reps) 55 ft x 2 trials   Steps/Stairs Level of Assist : 0 (Not tested) NT         Nursing:     Neuro:   A&O x_4___                   Respiratory:   [x] WNL   [] O2   [] LPM ______   Other:  Peripheral Vascular:   [] TEDS present   [] Edema present ____ Grade   Cardiac:   [x] WNL   [] Other  Genitourinary:   [x] continent   [] incontinent   [] lopez  Abdominal _______ LBM  GI: cardiac___ Diet __thin____ Liquids _____ tube feeds  Musculoskeletal: ____ ROM Transfers _____ Assistive Device Used  _min__ Level of Assistance  Skin Integumentary:   [x] Intact   [] Not Intact   __________Preventative Measures  Details______________________________________________________________  Pain: [x] Controlled   [] Not Controlled   Pain Meds:   [] Scheduled   [] PRN        Registered Dietitian / Nutrition:   Patient Vitals for the past 100 hrs:   % Diet Eaten   08/28/17 0940 100 %   08/26/17 1200 100 %   08/26/17 0800 100 %     Pt unavailable at time of visit. Has excellent meal intake per chart. Educational handout on coumadin and vitamin K consistent diet left in pt's room; has been previously educated per chart hx    Supplements:          [x] Yes: Ensure Enlive BID   [] No      Amount of supplement consumed:  unable to assess at time of visit      Intake/Output Summary (Last 24 hours) at 08/28/17 1218  Last data filed at 08/28/17 1139   Gross per 24 hour   Intake              240 ml   Output              500 ml   Net             -260 ml                               Last bowel movement: 8/27      Interdisciplinary Team Goals:     1. Goal  PT: Patient will transfer stand hop w/ RW SPV level. Barrier  balance, pace, R LE pain; residual limb pain    Intervention  TE; TA     2. Goal  OT: Patient to be independent with wound vac during self care. Barrier  Decrease UE strength     Intervention  UE strengthening and education with residual limb. 3. Goal      Barrier      Intervention       4. Goal  Nursing , free from falls and other injuries while in rehab. Barrier  Balance, Left AKA, above knee amputation of left lower ext. Intervention  Fall safety interventions, bed and chair alarms. 5. Goal  Address safety and increased dependency issues secondary to AKA    Barrier  Limited insight and unrealistic thinking    Intervention  Patient education and support      6. Goal  Po intake of meals will continue to meet >75% of patient estimated nutritional needs within the next 7 days. Outcome:  [x] Met/Ongoing    []  Not Met    [] New/Initial Goal     Barrier  none known     Intervention  modified diet, nutrition supplements       Disposition / Discharge Planning:      Follow-up therapy services:  tbd   DME recommendations:  tbd   Estimated discharge date: tbd   Discharge Location:  home         Electronic Signatures:      Signature Date Signed   Physical Therapist    Charlene Galindo PT DPT 08/28/2017   Occupational Therapist    Joseph GILLILAND/IWONA Perdomo, [address]  8/28/17 8/28/2017   Speech Therapist         Recreational Therapist    Dante Mercado, CTRS 8/29/2017   Nursing    Aysha Garcia RN  8/28/17   Dietitian    Manasa Mahoney RD  8/28/17   Clinical Psychologist    Kapil Shelby, PhD 8/28/17    Physician    Renetta Nagel MD   8/28/2017        Paul Ibarra, MSW  8/28/17         The above information has been reviewed with the patient in a language that they can understand. Opportunity for comments and questions has been provided and a signed attestation has been scanned into the \"media tab\" of the EMR.       Patient Signature: ______________________________________________________    Date Signed: __________________________________________________________

## 2017-08-28 NOTE — PROGRESS NOTES
[x] Psychology  [] Social Work [] Recreational Therapy    INTERVENTION  UNITS/TIME OF SERVICE   Assessment August 28, 2017   Supportive Counseling    Orientation    Discharge Planning    Resource Linkage              Progress/Current Status    Patient seen for Psychological Evaluation as requested on admission to ARU by Dr. Graves. Patient is very familiar with ARU from recent hospitalization and he returns following AKA. Patient expects to discharge to his daughter's residence in [facility] and then transition to a rented home in [place], where some accommodations are possibly being completed for him. However, he suggested that there will not be a ramp for entrance but he presumes he will be able to use steps. Obviously, patient is going to require further education, especially for safety and reinforcement for same, even in hospital bedroom; he indicated that he is sometimes standing independently. Patient is not presenting with symptoms of acute emotional distress; in fact, he seems very relieved to have surgery behind him. He will be monitored for emotional and/or behavioral difficulties while on ARU.     Sugey Smith, THE [facility] 8/28/2017 10:38 AM

## 2017-08-28 NOTE — PROGRESS NOTES
Problem: Self Care Deficits Care Plan (Adult)  Goal: *Therapy Goal (Edit Goal, Insert Text)  Long Term Goals (to be met upon discharge date) in order to increase pts functional independence and safety, and decrease burden of care:  1. Pt will perform self-feeding with independence. 2. Pt will perform grooming with independent. 3. Pt will perform UB bathing with independence. 4. Pt will perform LB bathing with Mod I.  5. Pt will perform tub/shower transfer with Mod I.   6. Pt will perform UB dressing with independence. 7. Pt will perform LB dressing with Mod Iindependence. 8. Pt will perform toileting task with Mod independence. 9. Pt will perform toilet transfer with Mod I. Short Term Weekly Goals for (17 to 17) in order to increase pts functional independence and safety, and decrease burden of care:  3. Pt will perform UB bathing with independence. 4. Pt will perform LB bathing with Supervision. 5. Pt will perform tub/shower transfer with supervision. 6. Pt will perform UB dressing with Supervision. 7. Pt will perform LB dressing with Supervision. 8. Pt will perform toileting task with Supervision. 9. Pt will perform toilet transfer with Supervision. OCCUPATIONAL THERAPY DAILY NOTE  Patient Name:Mj Tawanna Paty  Time In: 1000  Time Out: 1200  Time in 300  Time out 400     Medical Diagnosis:  Left AKA  Status post above knee amputation of left lower extremity (HCC) Status post above knee amputation of left lower extremity (HCC)      Pain at start of tx: 3-4/10  Pain at stop of tx : 3-4/10     Patient identified with name and :yes  Subjective: Pt reported he was feeling good although he was having pain in his (leg) residual limb. Objective: Pt education to desensitize residual limb, preparing for prosthesis leg. THERAPEUTIC ACTIVITY Daily Assessment     Pt participated with [place]   pegboard activity x2 on raise table with one pound wrist weight.  Focus on UE strengthening demonstrating UE flexion. THERAPEUTIC EXERCISE Daily Assessment     Pt demonstrate cardio exercise with 3 # dowel. Pt preforms 2x12 reps in all plane with rest between set and then challenge to preforms one set of each reps without rest. Followed with wheel chair push up 2x10. PM session: Pt issued green T- band and demonstrated HEP. Pt acknowledge understanding of HEP. Followed with triceps with 1Km for L UE and assist with extension with R  UE 2/2 decrease strength and motor control in flexion. Pt preformed 5x10 reps with rest 2/2 fatigue in UE. Assessment: Pt demonstrate weak scapulars and triceps, strength in biceps. Plan of Care: Con't plan of care with UE strengthening.       Yasmine TATE  8/28/2017

## 2017-08-28 NOTE — PROGRESS NOTES
Pt is a 59year old male admitted to ARU for left AKA. Pt is alert and oriented alone in the room. Pt is known to sw from recent admission       Pt states that he lives, alone in a trailer with 3 steps to enter and a right handrail. Pt states that he has a tub shower.       Pt states that prior to admission he was able to self care . Pt states that he was working full time at [employer] and has not been back since April 17.       Pt states that he is  from his wife but remains legally . Pt denies having a POA and notes that he wants his daughter, Dwight Gonzales ([phone]) to be his NOK contact.       Pt verifies his insurance as [employer]. Sw explained dc planning, team conference and insurance updates.       Pt states that he plans to stay with his daughter ([address], [place], [address] [facility]) and her fiance at [place] [id number] will follow.

## 2017-08-28 NOTE — REHAB NOTE
Alteplase 1 mg in 1 ml sterile water injected into red port of PICC line due to lack of blood return. Will leave to dwell for 1 hour.

## 2017-08-28 NOTE — TELEPHONE ENCOUNTER
Patient requesting eval for his right foot by his podiatrist,  dr Tiffany Meeks . Call was placed to dr Robinson Horta and will see.

## 2017-08-28 NOTE — REHAB NOTE
10 ml blood withdrawn from red port of PICC and discarded. Port flushed with 20 ml sterile normal saline. port ready for use.

## 2017-08-28 NOTE — REHAB NOTE
SHIFT CHANGE NOTE FOR [facility]    Bedside and Verbal shift change report given Uma Salinas RN (oncoming nurse) by Jazz Thacker RN (offgoing nurse). Report included the following information SBAR, Kardex, MAR and Recent Results. Situation:   Code Status: Full Code   Reason for Admission: Left AKA  Hospital Day: 3   Problem List:   Hospital Problems  Date Reviewed: 8/27/2017          Codes Class Noted POA    Skin ulcer of malleolar area of right ankle ([facility] (Chronic) ICD-10-CM: L97.319  ICD-9-CM: 707.13  Unknown Yes        * (Principal)Status post above knee amputation of left lower extremity ([facility] ICD-10-CM: T92.164  ICD-9-CM: V49.76  8/18/2017 Yes    Overview Signed 8/21/2017 10:21 PM by Mallory Sheridan MD     S/P Left above-the-knee amputation (8/18/2017 - Dr. Audrey Zaidi)               Aftercare for amputation stump ICD-10-CM: Z47.81  ICD-9-CM: V54.89  8/18/2017 Yes    Overview Signed 8/21/2017 10:25 PM by Mallory Sheridan MD     S/P Left above-the-knee amputation (8/18/2017 - Dr. Audrey Zaidi)             Ischemic rest pain of lower extremity ([facility] [facility] ICD-10-CM: I73.9  ICD-9-CM: 443.9  8/14/2017 Yes    Overview Signed 8/15/2017 11:09 AM by Mallory Sheridan MD     Left             Chronic ulcer of right heel ([facility] (Chronic) ICD-10-CM: L97.419  ICD-9-CM: 707.14  8/8/2017 Yes        Acute blood loss as cause of postoperative anemia ICD-10-CM: D62  ICD-9-CM: 285.1  7/25/2017 Yes        Aftercare following surgery of the circulatory system ICD-10-CM: Z48.812  ICD-9-CM: V58.73  7/25/2017 Yes    Overview Addendum 8/21/2017 10:22 PM by Mallory Sheridan MD     S/P Removal of infected graft; Repair of left superficial femoral artery; Repair of left common femoral artery (7/25/2017 - Dr. Audrey Zaidi); S/P Right axillary femoral jump bypass interposition; Removal of infected right axillary femoral bypass;  Wound VAC placement of upper thigh 1.2 cm x 5.2 cm x 1.8 cm and groin 4 cm x 0.5 cm x 0.2 cm (8/18/2017 - Dr. Garrett Pompa)             Impaired mobility and ADLs ICD-10-CM: Z74.09  ICD-9-CM: 799.89  7/24/2017 Yes        Infection of vascular bypass graft [facility]) ICD-10-CM: T82. 7XXA  ICD-9-CM: 996.62  7/24/2017 Yes    Overview Signed 8/7/2017  2:19 PM by Chandrakant Hernandez MD     Left lower extremity             Anticoagulated on Coumadin (Chronic) ICD-10-CM: Z51.81, Z79.01  ICD-9-CM: V58.83, V58.61  Unknown Yes        Paroxysmal atrial fibrillation (HCC) ICD-10-CM: I48.0  ICD-9-CM: 427.31  Unknown Yes        Peripheral artery disease ([facility] (Chronic) ICD-10-CM: I73.9  ICD-9-CM: 443.9  1/25/2017 Yes        Benign hypertensive heart disease with systolic congestive heart failure, NYHA class 2 (HCC) (Chronic) ICD-10-CM: I11.0, I50.20  ICD-9-CM: 402.11, 428.20, 428.0  1/26/2015 Yes              Background:   Past Medical History:   Past Medical History:   Diagnosis Date    Acute blood loss as cause of postoperative anemia 4/4/2017    Anticoagulated on Coumadin     Aortoiliac occlusive disease ([facility] 1/25/2017    Atherosclerosis of native artery of both lower extremities with intermittent claudication ([facility] 1/25/2017    Benign hypertensive heart disease with systolic congestive heart failure, NYHA class 2 ([address] 1/26/2015    Carotid artery disease (HCC)     Chronic anemia     Chronic systolic heart failure (HCC)     Chronic ulcer of right foot ([address] 4/4/2017    Chronic ulcer of right heel ([facility] 8/8/2017    Coronary artery disease involving native coronary artery of native heart 3/15/2017    Successful stenting of Cx (Xience LESLEY) and RCA (Xience LESLEY) to 0% by Dr. Felix Roman on 3/15/17.     DDD (degenerative disc disease), lumbar 1/26/2015    Dyslipidemia     Erectile dysfunction 7/5/2016    Euthyroid sick syndrome 4/6/2017    Hereditary peripheral neuropathy 11/15/2016    History of cardioversion 4/18/2017    S/P Synchronized external cardioversion (4/18/2017 - Dr. Anne Nguyen)    History of vitamin D deficiency 4/22/2017    Vitamin D 25-Hydroxy (4/22/2017) = 12.0; Vitamin D 25-Hydroxy (5/26/2017) = 38.7    Infection of vascular bypass graft ([facility] 7/24/2017    Left lower extremity    Ischemic cardiomyopathy     Moderate to severe pulmonary hypertension ([facility] 7/23/2017    Paroxysmal atrial fibrillation (HCC)     Peripheral artery disease ([facility] 1/25/2017    Skin ulcer of malleolar area of right ankle ([facility]     Spinal stenosis of lumbar region with radiculopathy 5/4/2015    Dr. Callie Matthews      Patient taking anticoagulants Heparin   Patient has a defibrillator: no    Assessment:   Changes in Assessment throughout shift:no change in assessment    Patient has central line:yes Reasons if yes: long term antibiotic therapy Insertion date:8/24/17 Last dressing date:8/24/17   Patient has Jiang Cath:no Reasons if yes:na  Insertion date:na     Last Vitals:     Vitals:    08/27/17 0823 08/27/17 1600 08/27/17 2229 08/28/17 0517   BP: 157/73 145/74 152/76 140/72   Pulse: (!) 56 66 72 70   Resp: 18 18 18    Temp: 97.1 °F (36.2 °C) 97.5 °F (36.4 °C) 97.9 °F (36.6 °C)    SpO2: 98% 98% 100%    Weight:       Height:            PAIN    Pain Assessment    Pain Intensity 1: 0 (08/28/17 0400) Pain Intensity 1: 2 (12/29/14 1105)    Pain Location 1: Leg Pain Location 1: Abdomen    Pain Intervention(s) 1: Medication (see MAR) Pain Intervention(s) 1: Medication (see MAR)  Patient Stated Pain Goal: 0 Patient Stated Pain Goal: 0  o Intervention effective: yes  o Other actions taken for pain: repositioned     Skin Assessment  Skin color Skin Color: Appropriate for ethnicity  Condition/Temperature Skin Condition/Temp: Cool  Integrity Skin Integrity: Blister, Incision (comment), Wound (add Wound LDA)  Turgor Turgor: Non-tenting  Weekly Pressure Ulcer Documentation  Pressure  Injury Documentation: No Pressure Injury Noted-Pressure Ulcer Prevention Initiated  Wound Prevention & Protection Methods  Orientation of wound Orientation of Wound Prevention: Posterior  Location of Prevention Location of Wound Prevention: Sacrum/Coccyx  Dressing Present Dressing Present : No  Dressing Status    Wound Offloading Wound Offloading (Prevention Methods): Bed, pressure redistribution/air     INTAKE/OUPUT    Date 08/27/17 0700 - 08/28/17 0659 08/28/17 0700 - 08/29/17 0659   Shift 0700-1859 1900-0659 24 Hour Total 0700-1859 1900-0659 24 Hour Total   I  N  T  A  K  E   Shift Total  (mL/kg)         O  U  T  P  U  T   Urine  (mL/kg/hr)            Urine Occurrence(s) 7 x  7 x       Stool            Stool Occurrence(s) 1 x  1 x       Shift Total  (mL/kg)         NET         Weight (kg) 70.8 70.8 70.8 70.8 70.8 70.8       Recommendations:  1. Patient needs and requests: urinal emptied ,repositioned    2. Diet: Cardiac     3. Pending tests/procedures: labs    4. Functional Level/Equipment: assist x 1 / w/c    5. Estimated Discharge Date: tbd Posted on Whiteboard in Patients Room: no    [facility] Safety Check    A safety check occurred in the patient's room between off going nurse and oncoming nurse listed above. The safety check included the below items  Area Items   H  High Alert Medications - Verify all high alert medication drips (heparin, PCA, etc.)   E  Equipment - Suction is set up for ALL patients (with yanker)  - Red plugs utilized for all equipment (IV pumps, etc.)  - WOWs wiped down at end of shift.  - Room stocked with oxygen, suction, and other unit-specific supplies   A  Alarms - Bed alarm is set for fall risk patients  - Ensure chair alarm is in place and activated if patient is up in a chair   L  Lines - Check IV for any infiltration  - Jiang bag is empty if patient has a Jiang   - Tubing and IV bags are labeled   S  Safety   - Room is clean, patient is clean, and equipment is clean. - Hallways are clear from equipment besides carts.    - Fall bracelet on for fall risk patients  - Ensure room is clear and free of clutter  - Suction is set up for ALL patients (with cailin)  - Hallways are clear from equipment besides carts.    - Isolation precautions followed, supplies available outside room, sign posted

## 2017-08-29 LAB
INR PPP: 2 (ref 0.8–1.2)
PROTHROMBIN TIME: 21.7 SEC (ref 11.5–15.2)

## 2017-08-29 PROCEDURE — 74011250637 HC RX REV CODE- 250/637: Performed by: INTERNAL MEDICINE

## 2017-08-29 PROCEDURE — 85610 PROTHROMBIN TIME: CPT | Performed by: INTERNAL MEDICINE

## 2017-08-29 PROCEDURE — 77030011256 HC DRSG MEPILEX <16IN NO BORD MOLN -A

## 2017-08-29 PROCEDURE — 74011000258 HC RX REV CODE- 258: Performed by: INTERNAL MEDICINE

## 2017-08-29 PROCEDURE — 65310000000 HC RM PRIVATE REHAB

## 2017-08-29 PROCEDURE — 97535 SELF CARE MNGMENT TRAINING: CPT

## 2017-08-29 PROCEDURE — 77030018836 HC SOL IRR NACL ICUM -A

## 2017-08-29 PROCEDURE — 97110 THERAPEUTIC EXERCISES: CPT

## 2017-08-29 PROCEDURE — 74011250636 HC RX REV CODE- 250/636: Performed by: INTERNAL MEDICINE

## 2017-08-29 PROCEDURE — 97530 THERAPEUTIC ACTIVITIES: CPT

## 2017-08-29 PROCEDURE — 97116 GAIT TRAINING THERAPY: CPT

## 2017-08-29 RX ORDER — WARFARIN SODIUM 5 MG/1
5 TABLET ORAL ONCE
Status: COMPLETED | OUTPATIENT
Start: 2017-08-29 | End: 2017-08-29

## 2017-08-29 RX ADMIN — Medication 250 MG: at 09:06

## 2017-08-29 RX ADMIN — Medication 1 CAPSULE: at 09:06

## 2017-08-29 RX ADMIN — ASPIRIN 81 MG 81 MG: 81 TABLET ORAL at 09:06

## 2017-08-29 RX ADMIN — METOPROLOL TARTRATE 25 MG: 25 TABLET, FILM COATED ORAL at 22:03

## 2017-08-29 RX ADMIN — WARFARIN SODIUM 5 MG: 5 TABLET ORAL at 18:05

## 2017-08-29 RX ADMIN — CYANOCOBALAMIN TAB 1000 MCG 1000 MCG: 1000 TAB at 09:06

## 2017-08-29 RX ADMIN — AMPICILLIN AND SULBACTAM 3 G: 1; 2 INJECTION, POWDER, FOR SOLUTION INTRAMUSCULAR; INTRAVENOUS at 15:20

## 2017-08-29 RX ADMIN — GABAPENTIN 300 MG: 300 CAPSULE ORAL at 22:03

## 2017-08-29 RX ADMIN — POTASSIUM CHLORIDE 20 MEQ: 1.5 POWDER, FOR SOLUTION ORAL at 09:07

## 2017-08-29 RX ADMIN — AMPICILLIN AND SULBACTAM 3 G: 1; 2 INJECTION, POWDER, FOR SOLUTION INTRAMUSCULAR; INTRAVENOUS at 00:46

## 2017-08-29 RX ADMIN — GABAPENTIN 300 MG: 300 CAPSULE ORAL at 06:07

## 2017-08-29 RX ADMIN — HYDROMORPHONE HYDROCHLORIDE 4 MG: 2 TABLET ORAL at 22:03

## 2017-08-29 RX ADMIN — AMIODARONE HYDROCHLORIDE 200 MG: 200 TABLET ORAL at 09:06

## 2017-08-29 RX ADMIN — VITAMIN D, TAB 1000IU (100/BT) 2000 UNITS: 25 TAB at 09:07

## 2017-08-29 RX ADMIN — LOSARTAN POTASSIUM 100 MG: 50 TABLET ORAL at 06:07

## 2017-08-29 RX ADMIN — Medication 400 MG: at 09:07

## 2017-08-29 RX ADMIN — HEPARIN SODIUM 5000 UNITS: 5000 INJECTION, SOLUTION INTRAVENOUS; SUBCUTANEOUS at 06:06

## 2017-08-29 RX ADMIN — DOCUSATE SODIUM 100 MG: 100 CAPSULE, LIQUID FILLED ORAL at 09:07

## 2017-08-29 RX ADMIN — METOPROLOL TARTRATE 25 MG: 25 TABLET, FILM COATED ORAL at 09:06

## 2017-08-29 RX ADMIN — ZINC SULFATE 220 MG (50 MG) CAPSULE 220 MG: CAPSULE at 09:06

## 2017-08-29 RX ADMIN — DOCUSATE SODIUM 100 MG: 100 CAPSULE, LIQUID FILLED ORAL at 18:05

## 2017-08-29 RX ADMIN — FUROSEMIDE 20 MG: 20 TABLET ORAL at 09:07

## 2017-08-29 RX ADMIN — HYDROMORPHONE HYDROCHLORIDE 4 MG: 2 TABLET ORAL at 00:47

## 2017-08-29 RX ADMIN — POLYETHYLENE GLYCOL 3350 17 G: 17 POWDER, FOR SOLUTION ORAL at 18:07

## 2017-08-29 RX ADMIN — ATORVASTATIN CALCIUM 40 MG: 40 TABLET, FILM COATED ORAL at 22:03

## 2017-08-29 RX ADMIN — Medication 325 MG: at 09:07

## 2017-08-29 RX ADMIN — HYDROMORPHONE HYDROCHLORIDE 4 MG: 2 TABLET ORAL at 06:08

## 2017-08-29 RX ADMIN — HYDROMORPHONE HYDROCHLORIDE 4 MG: 2 TABLET ORAL at 18:12

## 2017-08-29 RX ADMIN — AMPICILLIN AND SULBACTAM 3 G: 1; 2 INJECTION, POWDER, FOR SOLUTION INTRAMUSCULAR; INTRAVENOUS at 06:06

## 2017-08-29 RX ADMIN — AMPICILLIN AND SULBACTAM 3 G: 1; 2 INJECTION, POWDER, FOR SOLUTION INTRAMUSCULAR; INTRAVENOUS at 21:30

## 2017-08-29 RX ADMIN — GABAPENTIN 300 MG: 300 CAPSULE ORAL at 15:20

## 2017-08-29 RX ADMIN — HEPARIN SODIUM 5000 UNITS: 5000 INJECTION, SOLUTION INTRAVENOUS; SUBCUTANEOUS at 22:04

## 2017-08-29 RX ADMIN — HEPARIN SODIUM 5000 UNITS: 5000 INJECTION, SOLUTION INTRAVENOUS; SUBCUTANEOUS at 15:20

## 2017-08-29 NOTE — REHAB NOTE
SHIFT CHANGE NOTE FOR [facility]    Bedside and Verbal shift change report given to Solange Tucker,  RN (oncoming nurse) by Zac Fitzpatrick ,BEAU (offgoing nurse). Report included the following information SBAR, Kardex, MAR and Recent Results. Situation:   Code Status: Full Code   Reason for Admission: Left AKA  Hospital Day: 3   Problem List:   Hospital Problems  Date Reviewed: 8/28/2017          Codes Class Noted POA    Skin ulcer of malleolar area of right ankle ([facility] (Chronic) ICD-10-CM: L97.319  ICD-9-CM: 707.13  Unknown Yes        * (Principal)Status post above knee amputation of left lower extremity ([facility] ICD-10-CM: V32.368  ICD-9-CM: V49.76  8/18/2017 Yes    Overview Signed 8/21/2017 10:21 PM by Darvin Osler, MD     S/P Left above-the-knee amputation (8/18/2017 - Dr. Janel Mckeon)               Aftercare for amputation stump ICD-10-CM: Z47.81  ICD-9-CM: V54.89  8/18/2017 Yes    Overview Signed 8/21/2017 10:25 PM by Darvin Osler, MD     S/P Left above-the-knee amputation (8/18/2017 - Dr. Janel Mckeon)             Ischemic rest pain of lower extremity ([facility] ICD-10-CM: I73.9  ICD-9-CM: 443.9  8/14/2017 Yes    Overview Signed 8/15/2017 11:09 AM by Darvin Osler, MD     Left             Chronic ulcer of right heel ([facility] (Chronic) ICD-10-CM: L97.419  ICD-9-CM: 707.14  8/8/2017 Yes        Acute blood loss as cause of postoperative anemia ICD-10-CM: D62  ICD-9-CM: 285.1  7/25/2017 Yes        Aftercare following surgery of the circulatory system ICD-10-CM: Z48.812  ICD-9-CM: V58.73  7/25/2017 Yes    Overview Addendum 8/21/2017 10:22 PM by Darvin Osler, MD     S/P Removal of infected graft; Repair of left superficial femoral artery; Repair of left common femoral artery (7/25/2017 - Dr. Janel Mckeon); S/P Right axillary femoral jump bypass interposition; Removal of infected right axillary femoral bypass;  Wound VAC placement of upper thigh 1.2 cm x 5.2 cm x 1.8 cm and groin 4 cm x 0.5 cm x 0.2 cm (8/18/2017 - Dr. Garrett Pompa)             Impaired mobility and ADLs ICD-10-CM: Z74.09  ICD-9-CM: 799.89  7/24/2017 Yes        Infection of vascular bypass graft [facility]) ICD-10-CM: T82. 7XXA  ICD-9-CM: 996.62  7/24/2017 Yes    Overview Signed 8/7/2017  2:19 PM by Chandrakant Hernandez MD     Left lower extremity             Anticoagulated on Coumadin (Chronic) ICD-10-CM: Z51.81, Z79.01  ICD-9-CM: V58.83, V58.61  Unknown Yes        Paroxysmal atrial fibrillation (HCC) ICD-10-CM: I48.0  ICD-9-CM: 427.31  Unknown Yes        Peripheral artery disease ([facility] (Chronic) ICD-10-CM: I73.9  ICD-9-CM: 443.9  1/25/2017 Yes        Benign hypertensive heart disease with systolic congestive heart failure, NYHA class 2 (HCC) (Chronic) ICD-10-CM: I11.0, I50.20  ICD-9-CM: 402.11, 428.20, 428.0  1/26/2015 Yes              Background:   Past Medical History:   Past Medical History:   Diagnosis Date    Acute blood loss as cause of postoperative anemia 4/4/2017    Anticoagulated on Coumadin     Aortoiliac occlusive disease ([facility] 1/25/2017    Atherosclerosis of native artery of both lower extremities with intermittent claudication ([facility] 1/25/2017    Benign hypertensive heart disease with systolic congestive heart failure, NYHA class 2 ([address] 1/26/2015    Carotid artery disease (HCC)     Chronic anemia     Chronic systolic heart failure (HCC)     Chronic ulcer of right foot ([address] 4/4/2017    Chronic ulcer of right heel ([facility] 8/8/2017    Coronary artery disease involving native coronary artery of native heart 3/15/2017    Successful stenting of Cx (Xience LESLEY) and RCA (Xience LESLEY) to 0% by Dr. Felix Roman on 3/15/17.     DDD (degenerative disc disease), lumbar 1/26/2015    Dyslipidemia     Erectile dysfunction 7/5/2016    Euthyroid sick syndrome 4/6/2017    Hereditary peripheral neuropathy 11/15/2016    History of cardioversion 4/18/2017    S/P Synchronized external cardioversion (4/18/2017 - Dr. Anne Nguyen)    History of vitamin D deficiency 4/22/2017    Vitamin D 25-Hydroxy (4/22/2017) = 12.0; Vitamin D 25-Hydroxy (5/26/2017) = 38.7    Infection of vascular bypass graft ([facility] 7/24/2017    Left lower extremity    Ischemic cardiomyopathy     Moderate to severe pulmonary hypertension ([facility] 7/23/2017    Paroxysmal atrial fibrillation (HCC)     Peripheral artery disease ([facility] 1/25/2017    Skin ulcer of malleolar area of right ankle ([facility]     Spinal stenosis of lumbar region with radiculopathy 5/4/2015    Dr. Haylee Jones      Patient taking anticoagulants Heparin   Patient has a defibrillator: no    Assessment:   Changes in Assessment throughout shift:no change in assessment    Patient has central line:yes Reasons if yes: long term antibiotic therapy Insertion date:8/24/17 Last dressing date:8/24/17   Patient has Jiang Cath:no Reasons if yes:na  Insertion date:na     Last Vitals:     Vitals:    08/27/17 2229 08/28/17 0517 08/28/17 1015 08/28/17 1655   BP: 152/76 140/72 149/65 139/64   Pulse: 72 70 60 63   Resp: 18  18 18   Temp: 97.9 °F (36.6 °C)  97.4 °F (36.3 °C) 97.6 °F (36.4 °C)   SpO2: 100%  100% 100%   Weight:       Height:            PAIN    Pain Assessment    Pain Intensity 1: 6 (08/28/17 1947) Pain Intensity 1: 2 (12/29/14 1105)    Pain Location 1: Leg Pain Location 1: Abdomen    Pain Intervention(s) 1: Medication (see MAR) Pain Intervention(s) 1: Medication (see MAR)  Patient Stated Pain Goal: 0 Patient Stated Pain Goal: 0  o Intervention effective: yes  o Other actions taken for pain: repositioned     Skin Assessment  Skin color Skin Color: Appropriate for ethnicity  Condition/Temperature Skin Condition/Temp: Cool  Integrity Skin Integrity: Blister, Incision (comment)  Turgor Turgor: Non-tenting  Weekly Pressure Ulcer Documentation  Pressure  Injury Documentation: No Pressure Injury Noted-Pressure Ulcer Prevention Initiated  Wound Prevention & Protection Methods  Orientation of wound Orientation of Wound Prevention: Posterior  Location of Prevention Location of Wound Prevention: Sacrum/Coccyx  Dressing Present Dressing Present : No  Dressing Status    Wound Offloading Wound Offloading (Prevention Methods): Bed, pressure redistribution/air     INTAKE/OUPUT    Date 08/27/17 1900 - 08/28/17 0659 08/28/17 0700 - 08/29/17 0659   Shift 3440-0984 24 Hour Total 7438-8742 4166-7165 24 Hour Total   I  N  T  A  K  E   P.O.   1140  1140      P. O.   1140  1140    Shift Total  (mL/kg)   1140  (16.1)  1140  (16.1)   O  U  T  P  U  T   Urine  (mL/kg/hr)   900  (1.1)  900      Urine Voided   900  900      Urine Occurrence(s)  7 x 2 x  2 x    Stool           Stool Occurrence(s)  1 x 0 x  0 x    Shift Total  (mL/kg)   900  (12.7)  900  (12.7)   NET   240  240   Weight (kg) 70.8 70.8 70.8 70.8 70.8       Recommendations:  1. Patient needs and requests: urinal emptied ,repositioned    2. Diet: Cardiac     3. Pending tests/procedures: labs    4. Functional Level/Equipment: assist x 1 / w/c    5. Estimated Discharge Date: tbd Posted on Whiteboard in Patients Room: [facility] Safety Check    A safety check occurred in the patient's room between off going nurse and oncoming nurse listed above. The safety check included the below items  Area Items   H  High Alert Medications - Verify all high alert medication drips (heparin, PCA, etc.)   E  Equipment - Suction is set up for ALL patients (with yanker)  - Red plugs utilized for all equipment (IV pumps, etc.)  - WOWs wiped down at end of shift.  - Room stocked with oxygen, suction, and other unit-specific supplies   A  Alarms - Bed alarm is set for fall risk patients  - Ensure chair alarm is in place and activated if patient is up in a chair   L  Lines - Check IV for any infiltration  - Jiang bag is empty if patient has a Jiang   - Tubing and IV bags are labeled   S  Safety   - Room is clean, patient is clean, and equipment is clean. - Hallways are clear from equipment besides carts.    - Fall bracelet on for fall risk patients  - Ensure room is clear and free of clutter  - Suction is set up for ALL patients (with cailin)  - Hallways are clear from equipment besides carts.    - Isolation precautions followed, supplies available outside room, sign posted

## 2017-08-29 NOTE — PROGRESS NOTES
[address]  [address] [place] [address], [address]     INPATIENT REHABILITATION  DAILY PROGRESS NOTE     Date: 8/29/2017    Name: Sheri Moser Age / Sex: 59 y.o. / male   CSN: [id number] MRN: [id number]   [facility] Date: 8/25/2017 Length of Stay: 4 days     Primary Rehab Diagnosis: Impaired Mobility and ADLs secondary to:  1. S/P Left above-the-knee amputation (8/18/2017 - Dr. Polly Irwin)  3. History of ischemic rest pain of the left lower extremity due to severe peripheral vascular disease       Subjective:     Patient seen and examined. Blood pressure controlled. Team conference was held at bedside this AM.     Patient's Complaint:   Patient concerned about right foot ulcers not healing    Pain Control: stable, mild-to-moderate joint symptoms intermittently, reasonably well controlled by current meds      Objective:     Vital Signs:  Patient Vitals for the past 24 hrs:   BP Temp Pulse Resp SpO2   08/29/17 0712 152/69 97.7 °F (36.5 °C) 62 18 100 %   08/28/17 2000 133/61 98.6 °F (37 °C) 81 16 100 %   08/28/17 1655 139/64 97.6 °F (36.4 °C) 63 18 100 %        Physical Examination:  GENERAL SURVEY: Patient is awake, alert, oriented x 3, sitting comfortably on the chair, not in acute respiratory distress.   HEENT: pale palpebral conjunctivae, anicteric sclerae, no nasoaural discharge, moist oral mucosa  NECK: supple, no jugular venous distention, no palpable lymph nodes  CHEST/LUNGS: symmetrical chest expansion, good air entry, clear breath sounds  HEART: adynamic precordium, good S1 S2, no S3, regular rhythm, no murmurs  ABDOMEN: flat, bowel sounds appreciated, soft, non-tender  EXTREMITIES: (+) left AKA stump with dressing, (+) WoundVac on left medial thigh, (+) ~1 cm x ~1 cm ulcer on right heel, (+) 1.5 cm x 1.5 cm wound above lateral malleolus of right ankle, pale nailbeds, no edema, full and equal pulses, no calf tenderness   NEUROLOGICAL EXAM: The patient is awake, alert and oriented x3, able to answer questions fairly appropriately, able to follow 1 and 2 step commands. Able to tell time from the wall clock. Cranial nerves II-XII are grossly intact. No gross sensory deficit.   Motor strength is 4+/5 on BUE, 4+/5 on the RLE, 3+/5 on the left hip, 4-/5 on the left knee and left ankle.     Incision(s): healing well, clean, dry, and intact       Current Medications:  Current Facility-Administered Medications   Medication Dose Route Frequency    alteplase (CATHFLO) 1 mg in sterile water (preservative free) 1 mL injection  1 mg InterCATHeter PRN    losartan (COZAAR) tablet 100 mg  100 mg Oral DAILY    acetaminophen (TYLENOL) tablet 650 mg  650 mg Oral Q4H PRN    docusate sodium (COLACE) capsule 100 mg  100 mg Oral BID    bisacodyl (DULCOLAX) tablet 10 mg  10 mg Oral Q48H PRN    heparin (porcine) injection 5,000 Units  5,000 Units SubCUTAneous Q8H    lactobacillus sp. 50 billion cpu (BIO-K PLUS) capsule 1 Cap  1 Cap Oral DAILY    magnesium oxide (MAG-OX) tablet 400 mg  400 mg Oral DAILY    gabapentin (NEURONTIN) capsule 300 mg  300 mg Oral Q8H    furosemide (LASIX) tablet 20 mg  20 mg Oral DAILY    HYDROmorphone (DILAUDID) tablet 2-4 mg  2-4 mg Oral Q4H PRN    potassium chloride (KLOR-CON) packet 20 mEq  20 mEq Oral DAILY    polyethylene glycol (MIRALAX) packet 17 g  17 g Oral DAILY    amiodarone (CORDARONE) tablet 200 mg  200 mg Oral DAILY    aspirin chewable tablet 81 mg  81 mg Oral DAILY WITH BREAKFAST    cholecalciferol (VITAMIN D3) tablet 2,000 Units  2,000 Units Oral DAILY    ferrous sulfate tablet 325 mg  1 Tab Oral DAILY WITH BREAKFAST    ascorbic acid (vitamin C) (VITAMIN C) tablet 250 mg  250 mg Oral DAILY WITH BREAKFAST    zinc sulfate (ZINCATE) capsule 220 mg  1 Cap Oral DAILY    atorvastatin (LIPITOR) tablet 40 mg  40 mg Oral QHS    metoprolol tartrate (LOPRESSOR) tablet 25 mg  25 mg Oral Q12H    cyanocobalamin tablet 1,000 mcg  1,000 mcg Oral DAILY  WARFARIN INFORMATION NOTE (COUMADIN)   Other Rx Dosing/Monitoring    ampicillin-sulbactam (UNASYN) 3 g in 0.9% sodium chloride (MBP/ADV) 100 mL MBP  3 g IntraVENous Q6H       Allergies: Allergies   Allergen Reactions    Morphine Other (comments)     Patient gets confused with morphine. Functional Progress:    OCCUPATIONAL THERAPY    ON ADMISSION MOST RECENT   Eating  Functional Level: 6   Eating  Functional Level: 6     Grooming  Functional Level: 7   Grooming  Functional Level: 7     Bathing  Functional Level: 4   Bathing  Functional Level: 4     Upper Body Dressing  Functional Level: 7   Upper Body Dressing  Functional Level: 7     Lower Body Dressing  Functional Level: 4   Lower Body Dressing  Functional Level: 4     Toileting  Functional Level: 3   Toileting  Functional Level: 4     Toilet Transfers  Toilet Transfer Score: 4   Toilet Transfers  Toilet Transfer Score: 4     Tub /Shower Transfers  Tub/Shower Transfer Score: 4   Tub/Shower Transfers  Tub/Shower Transfer Score: 4       Legend:   7 - Independent   6 - Modified Independent   5 - Standby Assistance / Supervision / Set-up   4 - Minimum Assistance / Contact Guard Assistance   3 - Moderate Assistance   2 - Maximum Assistance   1 - Total Assistance / Dependent       Lab/Data Review:  Recent Results (from the past 24 hour(s))   PROTHROMBIN TIME + INR    Collection Time: 08/29/17  6:15 AM   Result Value Ref Range    Prothrombin time 21.7 (H) 11.5 - 15.2 sec    INR 2.0 (H) 0.8 - 1.2         Estimated Glomerular Filtration Rate:  On admission, estimated GFR based on a Creatinine of 0.62 mg/dl:   Using CKD-EPI = 108.6 mL/min/1.73m2   Using MDRD = 141.1 mL/min/1.73m2  Most recent estimated GFR, based on a Creatinine of 0.60 mg/dl on 8/28/2017:   Using CKD-EPI = 110.1 mL/min/1.73m2   Using MDRD = 146.6 mL/min/1.73m2       Assessment:     Primary Rehabilitation Diagnosis  1. Impaired Mobility and ADLs  2.  S/P Left above-the-knee amputation (8/18/2017 - Dr. Ar Bello)  3. History of ischemic rest pain of the left lower extremity due to severe peripheral vascular disease      Comorbidities   Benign hypertensive heart disease with systolic congestive heart failure, NYHA class 2  I11.0, I50.20    DDD (degenerative disc disease), lumbar M51.36    Erectile dysfunction N52.9    Spinal stenosis of lumbar region with radiculopathy M48.06, M54.16    Hereditary peripheral neuropathy G60.9    Aortoiliac occlusive disease  I74.09    Atherosclerosis of native artery of both lower extremities with intermittent claudication  I70.213    Peripheral artery disease  I73.9    Coronary artery disease involving native coronary artery of native heart I25.10    Paroxysmal atrial fibrillation  I48.0    Acute blood loss as cause of postoperative anemia D62    Anticoagulated on Coumadin Z51.81, Z79.01    Ischemic cardiomyopathy I25.5    Chronic systolic heart failure  S09.89    Carotid artery disease  I77.9    Dyslipidemia E78.5    Euthyroid sick syndrome E07.81    Aftercare following surgery of the circulatory system Z48.812    Status post femoral-popliteal bypass surgery Z95.828    History of cardioversion Z98.890    Critical ischemia of right lower extremity I99.8    History of vitamin D deficiency Z86.39    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    Infection of vascular bypass graft  T82. 7XXA    Chronic anemia D64.9    Chronic ulcer of right heel (HCC) L97.419    Prolonged Q-T interval on ECG R94.31    Aftercare for amputation stump Z47.81    Moderate to severe pulmonary hypertension  I27.2    Adverse effect of amiodarone T46.2X5A    Skin ulcer of malleolar area of right ankle  L97.319         Plan:     1. Justification for continued stay: Good progression towards established rehabilitation goals.     2. Medical Issues being followed closely:    [x]  Fall and safety precautions     [x]  Wound Care     [x]  Bowel and Bladder Function     [x] Fluid Electrolyte and Nutrition Balance     [x]  Pain Control      3. Issues that 24 hour rehabilitation nursing is following:    [x]  Fall and safety precautions     [x]  Wound Care     [x]  Bowel and Bladder Function     [x]  Fluid Electrolyte and Nutrition Balance     [x]  Pain Control      [x]  Assistance with and education on in-room safety with transfers to and from the bed, wheelchair, toilet and shower. 4. Acute rehabilitation plan of care:    [x]  Continue current care and rehab. [x]  Physical Therapy           [x]  Occupational Therapy           []  Speech Therapy     []  Hold Rehab until further notice     5. Medications:    [x]  MAR Reviewed     [x]  Continue Present Medications     6. DVT Prophylaxis:      []  Lovenox     []  Unfractionated Heparin     [x]  Coumadin     []  NOAC     []  VASQUEZ Stockings     []  Sequential Compression Device     []  None     7. Orders:   > S/P Left above-the-knee amputation (8/18/2017 - Dr. Ana M Suazo); History of ischemic rest pain of the left lower extremity due to severe peripheral artery disease   > Wound vac dressing changes by staff nurses every Monday, Wednesday, & Friday on day shift. Measure wound size & document with each dressing change. Settings: 125mmHG low continuous suction. Measure wound size & document with each dressing change. Change canister when 3/4 full. May use saline dressings daily until wound vac initiated.   > Mepilex Border dressings to left AKA staple line q2days & prn soilage. Wrap with ace wrap in figure 8 form.   > Staples to be removed on 9/15/2017    > S/P Removal of infected graft; Repair of left superficial femoral artery; Repair of left common femoral artery (7/25/2017 - Dr. Ana M Suazo);  History of sepsis associated with infection of vascular bypass graft   > Continue Ampicillin Sulbactam 3 grams IVPB q 6 hr (STOP DATE: 9/19/2017)    > Acute Postoperative Blood Loss Anemia   > Hgb/Hct (8/24/2017, prior to admission to the ARU) = 8.4/25.6   > Anemia work-up (8/22/2017) showed serum iron 15, TIBC 146, iron % saturation 10   > Hgb/Hct (8/26/2017, on admission) = 8.6/26.5   > Reticulocyte count (8/26/2017) = 4.7   > Ferritin (8/26/2017) = 1066   > Hgb/Hct (8/28/2017) = 9.0/27.9    > Continue:    > FeSO4 325 mg PO once daily with breakfast     > Ascorbic Acid 250 mg PO once daily with breakfast (to enhance the absorption of the FeSO4)     > Benign hypertensive heart disease with chronic systolic heart failure   > On 8/26/2017, increased Losartan from 50 mg to 100 mg PO once daily (6AM)   > Continue:    > Furosemide 20 mg PO once daily (6AM)    > Losartan 100 mg PO once daily (6AM)    > Metoprolol tartrate 25 mg PO q 12 hr (9AM, 9PM)    > Paroxysmal atrial fibrillation, presently normal sinus rhythm, anticoagulated on Coumadin   > Amiodarone 200 mg PO once daily   > Metoprolol tartrate 25 mg PO q 12 hr (9AM, 9PM)   > Heparin 5,000 units SC q 8 hr until INR is greater than 2.0 for 2 determinations   > Target INR = 2 to 3   > INR 2.0   > Patient received Coumadin 4 mg PO last night   > Coumadin 5 mg PO tonight    > Chronic ulcer of right heel    > Apply rigid heel suspension boots on both feet when supine in bed   > Podiatry follow-up (Dr. Socorro Chung) called by Vascular Surgery for comanagement   > Continue:    > Ascorbic acid 250 mg PO once daily with breakfast    > Zinc sulfate 220 mg PO once daily    > Skin ulcer of malleolar area of right ankle   > Cleanse wound with saline, apply Aquacel ag dressing, Mepilex Border to right lateral ankle wound every 48hrs & prn soilage. > Analgesia   > Continue:    > Acetaminophen 650 mg PO q 4 hr PRN for pain level less than 5/10    > Hydromorphone 2-4 mg PO q 4 hr PRN for pain level greater than 4/10      8. Patient's progress in rehabilitation and medical issues discussed with the patient. All questions answered to the best of my ability.  Care plan discussed with patient and nurse.       SignedDavid Guzman MD    August 29, 2017

## 2017-08-29 NOTE — ROUTINE PROCESS
SHIFT CHANGE NOTE FOR [facility]    Bedside and Verbal shift change report given to Stephanie Tracy RN (oncoming nurse) by Corbin Gonsales RN   (offgoing nurse). Report included the following information SBAR, Kardex, MAR and Recent Results. Situation:   Code Status: Full Code   Reason for Admission: AKA  Hospital Day: 4   Problem List:   Hospital Problems  Date Reviewed: 8/29/2017          Codes Class Noted POA    Skin ulcer of malleolar area of right ankle ([facility] [address] (Chronic) ICD-10-CM: L97.319  ICD-9-CM: 707.13  Unknown Yes        * (Principal)Status post above knee amputation of left lower extremity ([facility] [address] ICD-10-CM: Q77.249  ICD-9-CM: V49.76  8/18/2017 Yes    Overview Signed 8/21/2017 10:21 PM by Griselda Kennedy MD     S/P Left above-the-knee amputation (8/18/2017 - Dr. Jennifer Kent)               Aftercare for amputation stump ICD-10-CM: Z47.81  ICD-9-CM: V54.89  8/18/2017 Yes    Overview Signed 8/21/2017 10:25 PM by Griselda Kennedy MD     S/P Left above-the-knee amputation (8/18/2017 - Dr. Jennifer Kent)             Ischemic rest pain of lower extremity ([facility] [address] ICD-10-CM: I73.9  ICD-9-CM: 443.9  8/14/2017 Yes    Overview Signed 8/15/2017 11:09 AM by Griselda Kennedy MD     Left             Chronic ulcer of right heel ([facility] [address] (Chronic) ICD-10-CM: L97.419  ICD-9-CM: 707.14  8/8/2017 Yes        Acute blood loss as cause of postoperative anemia ICD-10-CM: D62  ICD-9-CM: 285.1  7/25/2017 Yes        Aftercare following surgery of the circulatory system ICD-10-CM: Z48.812  ICD-9-CM: V58.73  7/25/2017 Yes    Overview Addendum 8/21/2017 10:22 PM by Griselda Kennedy MD     S/P Removal of infected graft; Repair of left superficial femoral artery; Repair of left common femoral artery (7/25/2017 - Dr. Jennifer Kent); S/P Right axillary femoral jump bypass interposition; Removal of infected right axillary femoral bypass;  Wound VAC placement of upper thigh 1.2 cm x 5.2 cm x 1.8 cm and groin 4 cm x 0.5 cm x 0.2 cm (8/18/2017 - Dr. Courtney Alvarez)             Impaired mobility and ADLs ICD-10-CM: Z74.09  ICD-9-CM: 799.89  7/24/2017 Yes        Infection of vascular bypass graft [facility]) ICD-10-CM: T82. 7XXA  ICD-9-CM: 996.62  7/24/2017 Yes    Overview Signed 8/7/2017  2:19 PM by Andrew Garcia MD     Left lower extremity             Anticoagulated on Coumadin (Chronic) ICD-10-CM: Z51.81, Z79.01  ICD-9-CM: V58.83, V58.61  Unknown Yes        Paroxysmal atrial fibrillation (HCC) ICD-10-CM: I48.0  ICD-9-CM: 427.31  Unknown Yes        Peripheral artery disease ([facility] [address] (Chronic) ICD-10-CM: I73.9  ICD-9-CM: 443.9  1/25/2017 Yes        Benign hypertensive heart disease with systolic congestive heart failure, NYHA class 2 (HCC) (Chronic) ICD-10-CM: I11.0, I50.20  ICD-9-CM: 402.11, 428.20, 428.0  1/26/2015 Yes              Background:   Past Medical History:   Past Medical History:   Diagnosis Date    Acute blood loss as cause of postoperative anemia 4/4/2017    Anticoagulated on Coumadin     Aortoiliac occlusive disease ([address] 1/25/2017    Atherosclerosis of native artery of both lower extremities with intermittent claudication ([address] 1/25/2017    Benign hypertensive heart disease with systolic congestive heart failure, NYHA class 2 ([address] 1/26/2015    Carotid artery disease (HCC)     Chronic anemia     Chronic systolic heart failure (HCC)     Chronic ulcer of right foot ([address] 4/4/2017    Chronic ulcer of right heel ([address] 8/8/2017    Coronary artery disease involving native coronary artery of native heart 3/15/2017    Successful stenting of Cx (Xience LESLEY) and RCA (Xience LESLEY) to 0% by Dr. Lalito Zavaleta on 3/15/17.     DDD (degenerative disc disease), lumbar 1/26/2015    Dyslipidemia     Erectile dysfunction 7/5/2016    Euthyroid sick syndrome 4/6/2017    Hereditary peripheral neuropathy 11/15/2016    History of cardioversion 4/18/2017    S/P Synchronized external cardioversion (4/18/2017 - Dr. Yanique Broussard)    History of vitamin D deficiency 4/22/2017    Vitamin D 25-Hydroxy (4/22/2017) = 12.0; Vitamin D 25-Hydroxy (5/26/2017) = 38.7    Infection of vascular bypass graft ([facility] 7/24/2017    Left lower extremity    Ischemic cardiomyopathy     Moderate to severe pulmonary hypertension ([facility] 7/23/2017    Paroxysmal atrial fibrillation (HCC)     Peripheral artery disease ([facility] 1/25/2017    Skin ulcer of malleolar area of right ankle ([facility]     Spinal stenosis of lumbar region with radiculopathy 5/4/2015    Dr. Paul Cárdenas      Patient taking anticoagulants yes   Patient has a defibrillator: no     Assessment:   Changes in Assessment throughout shift: no     Patient has central line:yes Reasons if yes: long term antibiotic  Insertion date:08/24/17 Last dressing date:08/24/17   Patient has Jiang Cath: no Reasons if yes:     Insertion date:     Last Vitals:     Vitals:    08/28/17 1655 08/28/17 2000 08/29/17 0712 08/29/17 1507   BP: 139/64 133/61 152/69 148/68   Pulse: 63 81 62 67   Resp: 18 16 18 18   Temp: 97.6 °F (36.4 °C) 98.6 °F (37 °C) 97.7 °F (36.5 °C) 97.8 °F (36.6 °C)   SpO2: 100% 100% 100% 91%   Weight:       Height:            PAIN    Pain Assessment    Pain Intensity 1: 4 (08/29/17 1847) Pain Intensity 1: 2 (12/29/14 1105)    Pain Location 1: Leg Pain Location 1: Abdomen    Pain Intervention(s) 1: Medication (see MAR) Pain Intervention(s) 1: Medication (see MAR)  Patient Stated Pain Goal: 4 Patient Stated Pain Goal: 0  o Intervention effective: no    o Other actions taken for pain: pain medicine     Skin Assessment  Skin color Skin Color: Appropriate for ethnicity  Condition/Temperature Skin Condition/Temp: Warm  Integrity Skin Integrity: Incision (comment)  Turgor Turgor: Non-tenting  Weekly Pressure Ulcer Documentation  Pressure  Injury Documentation: No Pressure Injury Noted-Pressure Ulcer Prevention Initiated  Wound Prevention & Protection Methods  Orientation of wound Orientation of Wound Prevention: Posterior  Location of Prevention Location of Wound Prevention: Sacrum/Coccyx  Dressing Present Dressing Present : No  Dressing Status Dressing Status: Intact  Wound Offloading Wound Offloading (Prevention Methods): Bed, pressure redistribution/air, Chair cushion, Wheelchair     INTAKE/OUPUT    Date 08/28/17 1900 - 08/29/17 0659 08/29/17 0700 - 08/30/17 0659   Shift 9345-6321 24 Hour Total 4020-8547 5711-8286 24 Hour Total   I  N  T  A  K  E   P.O.  1140 660  660      P.O.  1140 660  660    Shift Total  (mL/kg)  1140  (16.1) 660  (9.3)  660  (9.3)   O  U  T  P  U  T   Urine  (mL/kg/hr)  900 750  (0.9)  750      Urine Voided  900 750  750      Urine Occurrence(s) 5 x 7 x 3 x  3 x    Stool           Stool Occurrence(s) 1 x 1 x 1 x  1 x    Shift Total  (mL/kg)  900  (12.7) 750  (10.6)  750  (10.6)   NET  240 -90  -90   Weight (kg) 70.8 70.8 70.8 70.8 70.8       Recommendations:  1. Patient needs and requests: pain medicine    2. Diet: cardiac    3. Pending tests/procedures: no     4. Functional Level/Equipment: 1 x person assist    5. Estimated Discharge Date: TDB Posted on Whiteboard in Patients Room: [facility] Safety Check    A safety check occurred in the patient's room between off going nurse and oncoming nurse listed above. The safety check included the below items  Area Items   H  High Alert Medications - Verify all high alert medication drips (heparin, PCA, etc.)   E  Equipment - Suction is set up for ALL patients (with yanker)  - Red plugs utilized for all equipment (IV pumps, etc.)  - WOWs wiped down at end of shift.  - Room stocked with oxygen, suction, and other unit-specific supplies   A  Alarms - Bed alarm is set for fall risk patients  - Ensure chair alarm is in place and activated if patient is up in a chair   L  Lines - Check IV for any infiltration  - Jiang bag is empty if patient has a Jiang   - Tubing and IV bags are labeled   S  Safety   - Room is clean, patient is clean, and equipment is clean.   - Hallways are clear from equipment besides carts. - Fall bracelet on for fall risk patients  - Ensure room is clear and free of clutter  - Suction is set up for ALL patients (with cailin)  - Hallways are clear from equipment besides carts.    - Isolation precautions followed, supplies available outside room, sign posted

## 2017-08-29 NOTE — REHAB NOTE
SHIFT CHANGE NOTE FOR [facility]    Bedside and Verbal shift change report given to Hema Colón RN (oncoming nurse) by Nellie Juarez RN (offgoing nurse). Report included the following information SBAR, Kardex, MAR and Recent Results. Situation:   Code Status: Full Code   Reason for Admission: Left AKA  Hospital Day: 4   Problem List:   Hospital Problems  Date Reviewed: 8/28/2017          Codes Class Noted POA    Skin ulcer of malleolar area of right ankle ([facility] [address] (Chronic) ICD-10-CM: L97.319  ICD-9-CM: 707.13  Unknown Yes        * (Principal)Status post above knee amputation of left lower extremity ([facility] [address] ICD-10-CM: Z04.534  ICD-9-CM: V49.76  8/18/2017 Yes    Overview Signed 8/21/2017 10:21 PM by Funmi Titus MD     S/P Left above-the-knee amputation (8/18/2017 - Dr. Catherine Vaz)               Aftercare for amputation stump ICD-10-CM: Z47.81  ICD-9-CM: V54.89  8/18/2017 Yes    Overview Signed 8/21/2017 10:25 PM by Funmi Titus MD     S/P Left above-the-knee amputation (8/18/2017 - Dr. Catherine Vaz)             Ischemic rest pain of lower extremity ([facility] ICD-10-CM: I73.9  ICD-9-CM: 443.9  8/14/2017 Yes    Overview Signed 8/15/2017 11:09 AM by Funmi Titus MD     Left             Chronic ulcer of right heel ([facility] (Chronic) ICD-10-CM: L97.419  ICD-9-CM: 707.14  8/8/2017 Yes        Acute blood loss as cause of postoperative anemia ICD-10-CM: D62  ICD-9-CM: 285.1  7/25/2017 Yes        Aftercare following surgery of the circulatory system ICD-10-CM: Z48.812  ICD-9-CM: V58.73  7/25/2017 Yes    Overview Addendum 8/21/2017 10:22 PM by Funmi Titus MD     S/P Removal of infected graft; Repair of left superficial femoral artery; Repair of left common femoral artery (7/25/2017 - Dr. Catherine Vaz); S/P Right axillary femoral jump bypass interposition; Removal of infected right axillary femoral bypass;  Wound VAC placement of upper thigh 1.2 cm x 5.2 cm x 1.8 cm and groin 4 cm x 0.5 cm x 0.2 cm (8/18/2017 - Dr. Ana M Suazo)             Impaired mobility and ADLs ICD-10-CM: Z74.09  ICD-9-CM: 799.89  7/24/2017 Yes        Infection of vascular bypass graft [facility]) ICD-10-CM: T82. 7XXA  ICD-9-CM: 996.62  7/24/2017 Yes    Overview Signed 8/7/2017  2:19 PM by Joyce Lagunas MD     Left lower extremity             Anticoagulated on Coumadin (Chronic) ICD-10-CM: Z51.81, Z79.01  ICD-9-CM: V58.83, V58.61  Unknown Yes        Paroxysmal atrial fibrillation (HCC) ICD-10-CM: I48.0  ICD-9-CM: 427.31  Unknown Yes        Peripheral artery disease ([facility] [address] (Chronic) ICD-10-CM: I73.9  ICD-9-CM: 443.9  1/25/2017 Yes        Benign hypertensive heart disease with systolic congestive heart failure, NYHA class 2 (HCC) (Chronic) ICD-10-CM: I11.0, I50.20  ICD-9-CM: 402.11, 428.20, 428.0  1/26/2015 Yes              Background:   Past Medical History:   Past Medical History:   Diagnosis Date    Acute blood loss as cause of postoperative anemia 4/4/2017    Anticoagulated on Coumadin     Aortoiliac occlusive disease ([address] 1/25/2017    Atherosclerosis of native artery of both lower extremities with intermittent claudication ([address] 1/25/2017    Benign hypertensive heart disease with systolic congestive heart failure, NYHA class 2 ([address] 1/26/2015    Carotid artery disease (HCC)     Chronic anemia     Chronic systolic heart failure (HCC)     Chronic ulcer of right foot ([address] 4/4/2017    Chronic ulcer of right heel ([address] 8/8/2017    Coronary artery disease involving native coronary artery of native heart 3/15/2017    Successful stenting of Cx (Xience LESLEY) and RCA (Xience LESLEY) to 0% by Dr. Lien Verma on 3/15/17.     DDD (degenerative disc disease), lumbar 1/26/2015    Dyslipidemia     Erectile dysfunction 7/5/2016    Euthyroid sick syndrome 4/6/2017    Hereditary peripheral neuropathy 11/15/2016    History of cardioversion 4/18/2017    S/P Synchronized external cardioversion (4/18/2017 - Dr. Asia Delcid)    History of vitamin D deficiency 4/22/2017    Vitamin D 25-Hydroxy (4/22/2017) = 12.0; Vitamin D 25-Hydroxy (5/26/2017) = 38.7    Infection of vascular bypass graft ([facility] 7/24/2017    Left lower extremity    Ischemic cardiomyopathy     Moderate to severe pulmonary hypertension ([facility] 7/23/2017    Paroxysmal atrial fibrillation (HCC)     Peripheral artery disease ([facility] 1/25/2017    Skin ulcer of malleolar area of right ankle ([facility]     Spinal stenosis of lumbar region with radiculopathy 5/4/2015    Dr. Sonido Ruiz      Patient taking anticoagulants Heparin   Patient has a defibrillator: no    Assessment:   Changes in Assessment throughout shift:no change in assessment    Patient has central line:yes Reasons if yes: long term antibiotic therapy Insertion date:8/24/17 Last dressing date:8/24/17   Patient has Jiang Cath:no Reasons if yes:na  Insertion date:na     Last Vitals:     Vitals:    08/28/17 0517 08/28/17 1015 08/28/17 1655 08/28/17 2000   BP: 140/72 149/65 139/64 133/61   Pulse: 70 60 63 81   Resp:  18 18 16   Temp:  97.4 °F (36.3 °C) 97.6 °F (36.4 °C) 98.6 °F (37 °C)   SpO2:  100% 100% 100%   Weight:       Height:            PAIN    Pain Assessment    Pain Intensity 1: 7 (08/29/17 0608) Pain Intensity 1: 2 (12/29/14 1105)    Pain Location 1: Leg Pain Location 1: Abdomen    Pain Intervention(s) 1: Medication (see MAR) Pain Intervention(s) 1: Medication (see MAR)  Patient Stated Pain Goal: 0 Patient Stated Pain Goal: 0  o Intervention effective: yes  o Other actions taken for pain: repositioned     Skin Assessment  Skin color Skin Color: Appropriate for ethnicity  Condition/Temperature Skin Condition/Temp: Warm  Integrity Skin Integrity: Incision (comment), Wound (add Wound LDA)  Turgor Turgor: Non-tenting  Weekly Pressure Ulcer Documentation  Pressure  Injury Documentation: No Pressure Injury Noted-Pressure Ulcer Prevention Initiated  Wound Prevention & Protection Methods  Orientation of wound Orientation of Wound Prevention: Posterior  Location of Prevention Location of Wound Prevention: Sacrum/Coccyx  Dressing Present Dressing Present : No  Dressing Status    Wound Offloading Wound Offloading (Prevention Methods): Bed, pressure redistribution/air     INTAKE/OUPUT    Date 08/28/17 0700 - 08/29/17 0659 08/29/17 0700 - 08/30/17 0659   Shift 2986-2432 2908-9770 24 Hour Total 9642-1017 5321-9068 24 Hour Total   I  N  T  A  K  E   P.O. 1140  1140         P. O. 1140  1140       Shift Total  (mL/kg) 1140  (16.1)  1140  (16.1)      O  U  T  P  U  T   Urine  (mL/kg/hr) 900  (1.1)  900         Urine Voided 900  900         Urine Occurrence(s) 2 x 3 x 5 x       Stool            Stool Occurrence(s) 0 x 1 x 1 x       Shift Total  (mL/kg) 900  (12.7)  900  (12.7)        240      Weight (kg) 70.8 70.8 70.8 70.8 70.8 70.8       Recommendations:  1. Patient needs and requests: urinal emptied ,repositioned    2. Diet: Cardiac     3. Pending tests/procedures: labs    4. Functional Level/Equipment: assist x 1 / w/c    5. Estimated Discharge Date: tbd Posted on Whiteboard in Patients Room: [facility] Safety Check    A safety check occurred in the patient's room between off going nurse and oncoming nurse listed above. The safety check included the below items  Area Items   H  High Alert Medications - Verify all high alert medication drips (heparin, PCA, etc.)   E  Equipment - Suction is set up for ALL patients (with yanker)  - Red plugs utilized for all equipment (IV pumps, etc.)  - WOWs wiped down at end of shift.  - Room stocked with oxygen, suction, and other unit-specific supplies   A  Alarms - Bed alarm is set for fall risk patients  - Ensure chair alarm is in place and activated if patient is up in a chair   L  Lines - Check IV for any infiltration  - Jiang bag is empty if patient has a Jiang   - Tubing and IV bags are labeled   S  Safety   - Room is clean, patient is clean, and equipment is clean.   - Hallways are clear from equipment besides carts. - Fall bracelet on for fall risk patients  - Ensure room is clear and free of clutter  - Suction is set up for ALL patients (with cailin)  - Hallways are clear from equipment besides carts.    - Isolation precautions followed, supplies available outside room, sign posted

## 2017-08-29 NOTE — PROGRESS NOTES
Problem: Falls - Risk of  Goal: *Absence of Falls  Document Antonio Fall Risk and appropriate interventions in the flowsheet.    Outcome: Progressing Towards Goal  Fall Risk Interventions:  Mobility Interventions: Bed/chair exit alarm     Mentation Interventions: Adequate sleep, hydration, pain control     Medication Interventions: Evaluate medications/consider consulting pharmacy     Elimination Interventions: Bed/chair exit alarm     History of Falls Interventions: Bed/chair exit alarm, Consult care management for discharge planning, Door open when patient unattended, Evaluate medications/consider consulting pharmacy, Room close to nurse's station

## 2017-08-29 NOTE — PROGRESS NOTES
Problem: Mobility Impaired (Adult and Pediatric)  Goal: *Acute Goals and Plan of Care (Insert Text)  Physical Therapy Short Term Goals  Initiated 8/26/2017 and to be accomplished within 7 day(s) 09/02/2017  1. Patient will move from supine to sit and sit to supine , scoot up and down and roll side to side in bed with independence. 2. Patient will transfer from bed to chair and chair to bed with supervision/set-up using the least restrictive device. 3. Patient will perform sit to stand with supervision/set-up. 4. Patient will ambulate with supervision/set-up for 50 feet with the least restrictive device. 5. Patient will ascend/descend 3 stairs with 2 handrail(s) with minimal assistance/contact guard assist.    Physical Therapy Long Term Goals  Initiated 8/26/2017 and to be accomplished within 14 day(s) 09/09/2017  1. Patient will move from supine to sit and sit to supine , scoot up and down and roll side to side in bed with independence. 2. Patient will transfer from bed to chair and chair to bed with modified independence using the least restrictive device. 3. Patient will perform sit to stand with modified independence. 4. Patient will ambulate with modified independence for 50 feet with the least restrictive device. 5. Patient will ascend/descend 3 stairs with 2 handrail(s) with supervision/set-up. Outcome: Progressing Towards Goal  PHYSICAL THERAPY DAILY NOTE  Patient Name:Mj Meza  Time In: 1330  Time Out: 1500  Patient Seen For: PM;Balance activities;Gait training;Patient education; Therapeutic exercise;Transfer training  Diagnosis: Left AKA  Status post above knee amputation of left lower extremity ([facility] [address] Status post above knee amputation of left lower extremity ([facility])  Precautions: falls, AKA, safety     Subjective: I really like the therapists here, they are an awesome team. I t hink I can get back to my daughter's home from here.      Pain at start of tx:no number rated (phantom)  Pain at stop of tx:no number rated (phantom)     Patient identified with name and :yes         Objective: Pt seen for skilled rehab for strength, transfers, mobility and safety training. GROSS ASSESSMENT Daily Assessment     AROM: Within functional limits  PROM: Within functional limits  Strength: Within functional limits  Coordination: Within functional limits  Tone: Normal  Sensation: Intact          BED/MAT MOBILITY Daily Assessment     Rolling Right : 7 (Independent)  Rolling Left : 7 (Independent)  Supine to Sit : 7 (Independent)  Sit to Supine : 7 (Independent)          TRANSFERS Daily Assessment     Transfer Type: Other  Other:  (stand step with RW)  Transfer Assistance : 4 (Contact guard assistance)  Sit to Stand Assistance: Contact guard assistance  Car Transfers: Not tested  Car Type:  (N/A) Pt continues to require CGA with transfers for safety as he tends to perform his transfers with increased speed and he is somewhat impulsive. GAIT Daily Assessment     Amount of Assistance: 4 (Contact guard assistance)  Distance (ft):  (50 ft x 2, 22 ft x 2)  Assistive Device: Walker, rolling          STEPS or STAIRS Daily Assessment     Steps/Stairs Ambulated (#):  (3 4 inch)  Level of Assist : 4 (Minimal assistance)  Rail Use: Both Pt was able to negotiate 3 4 inch steps for ascending and 2-6 inch steps for descending, using B handrails and Roem/modA with descending as he reporting feeling like he still had his LLE and attempted to use it. This caused his RLE to be positioned half way off the step and safety became main focus. Therapist managed canister for wound vac and min/mod assistance with patient negotiation. Pt also required VCs for safety and for hand and RLE foot placement.           BALANCE Daily Assessment     Sitting - Static: Good (unsupported)  Sitting - Dynamic: Good (unsupported)  Standing - Static: Fair  Standing - Dynamic : Impaired             LOWER EXTREMITY EXERCISES Daily Assessment Extremity: Right (QS,GS,SLR,abd,SAQs)  Exercise Type #1: Supine lower extremity strengthening  Sets Performed: 3  Reps Performed: 20  Level of Assist: Supervision  Exercise Type #2: Seated lower extremity strengthening (LAQs)  Sets Performed: 3  Reps Performed: 20  Level of Assist: Supervision              Assessment: Pt is making gains with functional mobility and safety. He is motivated and anxious to progress so that he can return home, however he will require additional strength, mobility and safety. Plan of Care: Continue with POC and focus on safe transfers and mobility training.   Junior Santo  8/29/2017

## 2017-08-29 NOTE — PROGRESS NOTES
Problem: Mobility Impaired (Adult and Pediatric)  Goal: *Acute Goals and Plan of Care (Insert Text)  Physical Therapy Short Term Goals  Initiated 8/26/2017 and to be accomplished within 7 day(s) 09/02/2017  1. Patient will move from supine to sit and sit to supine , scoot up and down and roll side to side in bed with independence. 2. Patient will transfer from bed to chair and chair to bed with supervision/set-up using the least restrictive device. 3. Patient will perform sit to stand with supervision/set-up. 4. Patient will ambulate with supervision/set-up for 50 feet with the least restrictive device. 5. Patient will ascend/descend 3 stairs with 2 handrail(s) with minimal assistance/contact guard assist.    Physical Therapy Long Term Goals  Initiated 8/26/2017 and to be accomplished within 14 day(s) 09/09/2017  1. Patient will move from supine to sit and sit to supine , scoot up and down and roll side to side in bed with independence. 2. Patient will transfer from bed to chair and chair to bed with modified independence using the least restrictive device. 3. Patient will perform sit to stand with modified independence. 4. Patient will ambulate with modified independence for 50 feet with the least restrictive device. 5. Patient will ascend/descend 3 stairs with 2 handrail(s) with supervision/set-up. PHYSICAL THERAPY DAILY NOTE  Patient Name:Mj Newman  Time In: 3523  Time Out: 3738  Patient Seen For: Wheelchair mobility;Transfer training; Therapeutic exercise;Patient education;Gait training;Balance activities  Diagnosis: Left AKA  Status post above knee amputation of left lower extremity ([address] Status post above knee amputation of left lower extremity ([address]  Precautions: Fall risk     Subjective: Patient reports motivation to participate in PT session. Notes that he has felt much better since his amputation. Pain at start of tx:6/10  Pain at stop of tx:\"It's up there\" only statement noted. 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 BED/MAT MOBILITY Daily Assessment     Rolling Right : 7 (Independent)  Rolling Left : 7 (Independent)  Supine to Sit : 7 (Independent)  Sit to Supine : 7 (Independent)          TRANSFERS Daily Assessment     Transfer Type: Other  Other: Patient performs stand hop transfer with RW requiring verbal cueing for technique, pace, and CGA/Bob x 1 for balance assist during initial trial. Patient educated on improve COG control for STS and timing for transition of hand placement to improve understanding of proper technique. Patient performs technique x 5 trials following initial trial with close SBA for safety and cueing. Transfer Assistance : 4 (Minimal assistance) (progressing to SBA with education)  Sit to Stand Assistance: Minimal assistance (progressing to SBA with education)          GAIT Daily Assessment     Amount of Assistance: 4 (Contact guard assistance)  Distance (ft): 55 Feet (ft) (x 2 trials)  Assistive Device: Walker, rolling      Patient requires increased time an CGA for balance and safety cues during ambulation this AM. Patient self selects distance as he notes he is limited by residual limb pain and R LE/UE fatigue. Decreased step clearance noted during ambulation today requiring increased attention to address via v/cing.           BALANCE Daily Assessment     Sitting - Static: Normal   Sitting - Dynamic: Normal  Standing - Static: Fair  Standing - Dynamic : Impaired          WHEELCHAIR MOBILITY Daily Assessment     Able to Propel (ft): 250 feet  Functional Level: 6   B UE technique x 2 trials          LOWER EXTREMITY EXERCISES Daily Assessment     Extremity: Left  Exercise Type #1: Other (comment) (Supine L LE glut bridge with residual limb on bolster)  Sets Performed: 3  Reps Performed:  (12- 3 sec hold)  Level of Assist: Supervision  Exercise Type #2: Other (comment) (supine iliopsoas stertch 2/2 hypertonicity; reduced w/ stret)  Sets Performed: 1  Reps Performed:  (5 min with intermittent glut setting)  Level of Assist: Supervision              Assessment: Patient with decreased comfort and required residual limb wound dressing following therex. Patient does not report pain level following TE though he states \"It sure is up there\". Improved L glut strength noted today. Patient report thankfulness for PT treatment following session; Patient pleasant and appropriate t/o session. Plan of Care: Cont current POC increase focus on L LE iliopsoas stretching and glut strengthening as tolerated. Cheng Martinez, PT DPT  8/28/2017

## 2017-08-29 NOTE — INTERDISCIPLINARY ROUNDS
[facility] PHYSICAL REHABILITATION  [address] [place] [address], [address]    INPATIENT REHABILITATION  TEAM CONFERENCE SUMMARY     Date of Conference: 8/29/2017    Name: Eduardo Saavedra Age / Sex: 59 y.o. / male   CSN: [id number] MRN: [id number]   [facility] Date: 8/25/2017 Length of Stay: 4 days     Primary Rehabilitation Diagnosis  1. Impaired Mobility and ADLs  2. S/P Left above-the-knee amputation (8/18/2017 - Dr. Atul Kirkland)  3. History of ischemic rest pain of the left lower extremity due to severe peripheral vascular disease      Comorbidities   Benign hypertensive heart disease with systolic congestive heart failure, NYHA class 2  I11.0, I50.20    DDD (degenerative disc disease), lumbar M51.36    Erectile dysfunction N52.9    Spinal stenosis of lumbar region with radiculopathy M48.06, M54.16    Hereditary peripheral neuropathy G60.9    Aortoiliac occlusive disease  I74.09    Atherosclerosis of native artery of both lower extremities with intermittent claudication  I70.213    Peripheral artery disease  I73.9    Coronary artery disease involving native coronary artery of native heart I25.10    Paroxysmal atrial fibrillation  I48.0    Acute blood loss as cause of postoperative anemia D62    Anticoagulated on Coumadin Z51.81, Z79.01    Ischemic cardiomyopathy I25.5    Chronic systolic heart failure  E99.95    Carotid artery disease  I77.9    Dyslipidemia E78.5    Euthyroid sick syndrome E07.81    Aftercare following surgery of the circulatory system Z48.812    Status post femoral-popliteal bypass surgery Z95.828    History of cardioversion Z98.890    Critical ischemia of right lower extremity I99.8    History of vitamin D deficiency Z86.39    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    Infection of vascular bypass graft  T82. 7XXA    Chronic anemia D64.9    Chronic ulcer of right heel (HCC) L97.419    Prolonged Q-T interval on ECG R94.31    Aftercare for amputation stump Z47.81    Moderate to severe pulmonary hypertension  I27.2    Adverse effect of amiodarone T46.2X5A    Skin ulcer of malleolar area of right ankle  L97.319           Therapy:     FIM SCORES Initial Assessment Weekly Progress Assessment 8/29/2017   Eating Functional Level: 6  Comments: pt has dentures, independent with self feeding  6 mod independent   Swallowing     Grooming 7  7 independent   Bathing 4     4 min assist   Upper Body Dressing Functional Level: 7  Items Applied/Steps Completed: Pullover (4 steps)  7 independent   Lower Body Dressing Functional Level: 4  Items Applied/Steps Completed: Elastic waist pants (3 steps), Sock, right (1 step)  Comments: pt donned pants seated on edge of bed, assist to thread wound vac tube through pant leg, CGA to pullup pants  4 -min assist   Toileting Functional Level: 3  Comments: min assist to pull up clothing due to decreased balance  3 mod assist   Bladder - level of assist 4     Bladder - accident frequency score 6     Bowel - level of assist 3     Bowel - accident frequency score 4     Toilet Transfer [place] Toilet Transfer Score: 4  Comments: min assist stand pivot transfer from w/c to commode with grab bars or RW, assist due to decreased balance and decreased confidence with transfers   4- min assist   Tub/Shower Transfer [place] Tub or Shower Type: Tub/Shower combination  Tub/Shower Transfer Score: 4  Comments: min assist stand pivot to tub bench stand pivot transfer from w/c to tu     4- min assist   Comprehension Primary Mode of Comprehension: Auditory  Score: 6        Expression Primary Mode of Expression: Verbal  Score: 6        Social Interaction Score: 5     Problem Solving Score: 4     Memory Score: 4       FIM SCORES Initial Assessment Weekly Progress Assessment 8/29/2017   Bed/Chair/Wheelchair Transfers Transfer Type:  Other  Other: Stand-step with RW with CGA/SBA  Transfer Assistance : 4 (Contact guard assistance)  Sit to Stand Assistance: Contact guard assistance  Car Transfers: Not tested  Car Type: n/a Patient performs stand hop transfer with RW requiring verbal cueing for technique, pace, and CGA/Bob x 1 for balance assist during initial trial. Patient educated on improve COG control for STS and timing for transition of hand placement to improve understanding of proper technique. Patient performs technique x 5 trials following initial trial with close SBA for safety and cueing.     Bed Mobility Rolling Right 7 (Independent)   Rolling Left 7 (Independent)   Supine to Sit 7 (Independent)   Sit to Stand Contact guard assistance   Sit to Supine 7 (Independent)    Rolling Right   7   Rolling Left    7   Supine to Sit    7   Sit to Stand   CGA/SBA   Sit to Supine    7      Locomotion (W/C) Able to Propel (ft): 250 feet  Functional Level: 6  Curbs/Ramps Assist Required (FIM Score): 6 (Modified independent)  Wheelchair Setup Assist Required : 6 (Modified independent)  Wheelchair Management: Manages right brake, Manages left brake Function 250  Setup Assistance  6     Locomotion (W/C distance) 150 ft 250 ft   Locomotion (Walk) 4 (Contact guard assistance) 4      Locomotion (Walk dist.) 40 Feet (ft) (x2 reps) 55 ft x 2 trials   Steps/Stairs Level of Assist : 0 (Not tested) NT         Nursing:     Neuro:   A&O x_4___                   Respiratory:   [x] WNL   [] O2   [] LPM ______   Other:  Peripheral Vascular:   [] TEDS present   [] Edema present ____ Grade   Cardiac:   [x] WNL   [] Other  Genitourinary:   [x] continent   [] incontinent   [] lopez  Abdominal _______ LBM  GI: cardiac___ Diet __thin____ Liquids _____ tube feeds  Musculoskeletal: ____ ROM Transfers _____ Assistive Device Used  _min__ Level of Assistance  Skin Integumentary:   [x] Intact   [] Not Intact   __________Preventative Measures  Details______________________________________________________________  Pain: [x] Controlled   [] Not Controlled   Pain Meds:   [] Scheduled   [] PRN        Registered Dietitian / Nutrition:     Patient Vitals for the past 100 hrs:   % Diet Eaten   08/29/17 0935 100 %   08/28/17 1824 85 %   08/28/17 1240 100 %   08/28/17 0940 100 %   08/26/17 1200 100 %   08/26/17 0800 100 %     Pt unavailable at time of visit. Has excellent meal intake per chart. Educational handout on coumadin and vitamin K consistent diet left in pt's room; has been previously educated per chart hx    Supplements:          [x] Yes: Ensure Enlive BID   [] No      Amount of supplement consumed:  unable to assess at time of visit      Intake/Output Summary (Last 24 hours) at 08/29/17 1432  Last data filed at 08/29/17 0935   Gross per 24 hour   Intake              660 ml   Output              800 ml   Net             -140 ml                               Last bowel movement: 8/27      Interdisciplinary Team Goals:     1. Goal  PT: Patient will transfer stand hop w/ RW SPV level. Barrier  balance, pace, R LE pain; residual limb pain    Intervention  TE; TA     2. Goal  OT: Patient to be independent with wound vac during self care. Barrier  Decrease UE strength     Intervention  UE strengthening and education with residual limb. 3. Goal      Barrier      Intervention       4. Goal  Nursing , free from falls and other injuries while in rehab. Barrier  Balance, Left AKA, above knee amputation of left lower ext. Intervention  Fall safety interventions, bed and chair alarms. 5. Goal  Address safety and increased dependency issues secondary to AKA    Barrier  Limited insight and unrealistic thinking    Intervention  Patient education and support      6. Goal  Po intake of meals will continue to meet >75% of patient estimated nutritional needs within the next 7 days. Outcome:  [x] Met/Ongoing    []  Not Met    [] New/Initial Goal     Barrier  none known     Intervention  modified diet, nutrition supplements       Disposition / Discharge Planning:      Follow-up therapy services:  tbd   DME recommendations:  tbd   Estimated discharge date:  tbd   Discharge Location:  home         Interdisciplinary team rounds were held this AM with the following team members:       Name   Physical Therapist    Elayne Sears PT DPT   Occupational Therapist    N[address]   Speech Therapist       Recreational Therapist    Salvador Schwab, [address] [place] [address], RN   Dietitiliss Atkinson RD   Clinical Psychologist    Patrick Jiang, PhD   Physician    Bianca Hammond MD        ARNOLD Garcias       Signed:  Bianca Hammond MD    August 29, 2017

## 2017-08-29 NOTE — PROGRESS NOTES
Received call from patient daughter. Patient concerned about right heel ulcer not healing  Will order duplex study and ask Podiatry to follow up.

## 2017-08-30 LAB
INR PPP: 2.1 (ref 0.8–1.2)
PROTHROMBIN TIME: 22.8 SEC (ref 11.5–15.2)

## 2017-08-30 PROCEDURE — 97110 THERAPEUTIC EXERCISES: CPT

## 2017-08-30 PROCEDURE — 93926 LOWER EXTREMITY STUDY: CPT

## 2017-08-30 PROCEDURE — 65310000000 HC RM PRIVATE REHAB

## 2017-08-30 PROCEDURE — 77030033263 HC DRSG MEPILEX 16-48IN BORD MOLN -B

## 2017-08-30 PROCEDURE — 97535 SELF CARE MNGMENT TRAINING: CPT

## 2017-08-30 PROCEDURE — 36592 COLLECT BLOOD FROM PICC: CPT

## 2017-08-30 PROCEDURE — 74011000258 HC RX REV CODE- 258: Performed by: INTERNAL MEDICINE

## 2017-08-30 PROCEDURE — 74011250636 HC RX REV CODE- 250/636: Performed by: INTERNAL MEDICINE

## 2017-08-30 PROCEDURE — 97530 THERAPEUTIC ACTIVITIES: CPT

## 2017-08-30 PROCEDURE — 77030011256 HC DRSG MEPILEX <16IN NO BORD MOLN -A

## 2017-08-30 PROCEDURE — 97116 GAIT TRAINING THERAPY: CPT

## 2017-08-30 PROCEDURE — 85610 PROTHROMBIN TIME: CPT | Performed by: INTERNAL MEDICINE

## 2017-08-30 PROCEDURE — 74011250637 HC RX REV CODE- 250/637: Performed by: INTERNAL MEDICINE

## 2017-08-30 RX ORDER — WARFARIN SODIUM 5 MG/1
5 TABLET ORAL ONCE
Status: COMPLETED | OUTPATIENT
Start: 2017-08-30 | End: 2017-08-30

## 2017-08-30 RX ADMIN — LOSARTAN POTASSIUM 100 MG: 50 TABLET ORAL at 05:17

## 2017-08-30 RX ADMIN — HYDROMORPHONE HYDROCHLORIDE 4 MG: 2 TABLET ORAL at 05:17

## 2017-08-30 RX ADMIN — DOCUSATE SODIUM 100 MG: 100 CAPSULE, LIQUID FILLED ORAL at 17:52

## 2017-08-30 RX ADMIN — AMPICILLIN AND SULBACTAM 3 G: 1; 2 INJECTION, POWDER, FOR SOLUTION INTRAMUSCULAR; INTRAVENOUS at 15:58

## 2017-08-30 RX ADMIN — METOPROLOL TARTRATE 25 MG: 25 TABLET, FILM COATED ORAL at 09:00

## 2017-08-30 RX ADMIN — METOPROLOL TARTRATE 25 MG: 25 TABLET, FILM COATED ORAL at 20:43

## 2017-08-30 RX ADMIN — WARFARIN SODIUM 5 MG: 5 TABLET ORAL at 17:52

## 2017-08-30 RX ADMIN — ZINC SULFATE 220 MG (50 MG) CAPSULE 220 MG: CAPSULE at 08:59

## 2017-08-30 RX ADMIN — HYDROMORPHONE HYDROCHLORIDE 4 MG: 2 TABLET ORAL at 20:43

## 2017-08-30 RX ADMIN — Medication 325 MG: at 09:00

## 2017-08-30 RX ADMIN — AMPICILLIN AND SULBACTAM 3 G: 1; 2 INJECTION, POWDER, FOR SOLUTION INTRAMUSCULAR; INTRAVENOUS at 20:42

## 2017-08-30 RX ADMIN — FUROSEMIDE 20 MG: 20 TABLET ORAL at 08:59

## 2017-08-30 RX ADMIN — AMPICILLIN AND SULBACTAM 3 G: 1; 2 INJECTION, POWDER, FOR SOLUTION INTRAMUSCULAR; INTRAVENOUS at 09:01

## 2017-08-30 RX ADMIN — GABAPENTIN 300 MG: 300 CAPSULE ORAL at 23:23

## 2017-08-30 RX ADMIN — ASPIRIN 81 MG 81 MG: 81 TABLET ORAL at 09:00

## 2017-08-30 RX ADMIN — VITAMIN D, TAB 1000IU (100/BT) 2000 UNITS: 25 TAB at 08:59

## 2017-08-30 RX ADMIN — POTASSIUM CHLORIDE 20 MEQ: 1.5 POWDER, FOR SOLUTION ORAL at 08:59

## 2017-08-30 RX ADMIN — Medication 250 MG: at 09:00

## 2017-08-30 RX ADMIN — Medication 400 MG: at 09:00

## 2017-08-30 RX ADMIN — GABAPENTIN 300 MG: 300 CAPSULE ORAL at 14:07

## 2017-08-30 RX ADMIN — POLYETHYLENE GLYCOL 3350 17 G: 17 POWDER, FOR SOLUTION ORAL at 18:17

## 2017-08-30 RX ADMIN — AMPICILLIN AND SULBACTAM 3 G: 1; 2 INJECTION, POWDER, FOR SOLUTION INTRAMUSCULAR; INTRAVENOUS at 04:00

## 2017-08-30 RX ADMIN — DOCUSATE SODIUM 100 MG: 100 CAPSULE, LIQUID FILLED ORAL at 08:59

## 2017-08-30 RX ADMIN — AMIODARONE HYDROCHLORIDE 200 MG: 200 TABLET ORAL at 09:00

## 2017-08-30 RX ADMIN — CYANOCOBALAMIN TAB 1000 MCG 1000 MCG: 1000 TAB at 09:00

## 2017-08-30 RX ADMIN — ATORVASTATIN CALCIUM 40 MG: 40 TABLET, FILM COATED ORAL at 20:43

## 2017-08-30 RX ADMIN — GABAPENTIN 300 MG: 300 CAPSULE ORAL at 05:17

## 2017-08-30 RX ADMIN — HEPARIN SODIUM 5000 UNITS: 5000 INJECTION, SOLUTION INTRAVENOUS; SUBCUTANEOUS at 05:17

## 2017-08-30 RX ADMIN — Medication 1 CAPSULE: at 08:59

## 2017-08-30 RX ADMIN — HEPARIN SODIUM 5000 UNITS: 5000 INJECTION, SOLUTION INTRAVENOUS; SUBCUTANEOUS at 14:07

## 2017-08-30 NOTE — PROGRESS NOTES
[id number]  [address] [place] [address], [address]     INPATIENT REHABILITATION  DAILY PROGRESS NOTE     Date: 8/30/2017    Name: Smitha Saul Age / Sex: 59 y.o. / male   CSN: [id number] MRN: [id number]   [facility] Date: 8/25/2017 Length of Stay: 5 days     Primary Rehab Diagnosis: Impaired Mobility and ADLs secondary to:  1. S/P Left above-the-knee amputation (8/18/2017 - Dr. Pop Urbina)  3. History of ischemic rest pain of the left lower extremity due to severe peripheral vascular disease       Subjective:     Patient seen and examined. Blood pressure controlled. Patient's Complaint:   No significant medical complaints    Pain Control: stable, mild-to-moderate joint symptoms intermittently, reasonably well controlled by current meds      Objective:     Vital Signs:  Patient Vitals for the past 24 hrs:   BP Temp Pulse Resp SpO2   08/30/17 0810 138/70 96.8 °F (36 °C) 88 18 100 %   08/30/17 0517 142/70 - 62 - -   08/29/17 2000 154/73 98 °F (36.7 °C) 64 16 100 %   08/29/17 1507 148/68 97.8 °F (36.6 °C) 67 18 91 %        Physical Examination:  GENERAL SURVEY: Patient is awake, alert, oriented x 3, sitting comfortably on the chair, not in acute respiratory distress.   HEENT: pale palpebral conjunctivae, anicteric sclerae, no nasoaural discharge, moist oral mucosa  NECK: supple, no jugular venous distention, no palpable lymph nodes  CHEST/LUNGS: symmetrical chest expansion, good air entry, clear breath sounds  HEART: adynamic precordium, good S1 S2, no S3, regular rhythm, no murmurs  ABDOMEN: flat, bowel sounds appreciated, soft, non-tender  EXTREMITIES: (+) left AKA stump with dressing, (+) WoundVac on left medial thigh, (+) ~1 cm x ~1 cm ulcer on right heel, (+) 1.5 cm x 1.5 cm wound above lateral malleolus of right ankle, pale nailbeds, no edema, full and equal pulses, no calf tenderness   NEUROLOGICAL EXAM: The patient is awake, alert and oriented x3, able to answer questions fairly appropriately, able to follow 1 and 2 step commands. Able to tell time from the wall clock. Cranial nerves II-XII are grossly intact. No gross sensory deficit.   Motor strength is 4+/5 on BUE, 4+/5 on the RLE, 3+/5 on the left hip, 4-/5 on the left knee and left ankle.     Incision(s): healing well, clean, dry, and intact       Current Medications:  Current Facility-Administered Medications   Medication Dose Route Frequency    alteplase (CATHFLO) 1 mg in sterile water (preservative free) 1 mL injection  1 mg InterCATHeter PRN    losartan (COZAAR) tablet 100 mg  100 mg Oral DAILY    acetaminophen (TYLENOL) tablet 650 mg  650 mg Oral Q4H PRN    docusate sodium (COLACE) capsule 100 mg  100 mg Oral BID    bisacodyl (DULCOLAX) tablet 10 mg  10 mg Oral Q48H PRN    heparin (porcine) injection 5,000 Units  5,000 Units SubCUTAneous Q8H    lactobacillus sp. 50 billion cpu (BIO-K PLUS) capsule 1 Cap  1 Cap Oral DAILY    magnesium oxide (MAG-OX) tablet 400 mg  400 mg Oral DAILY    gabapentin (NEURONTIN) capsule 300 mg  300 mg Oral Q8H    furosemide (LASIX) tablet 20 mg  20 mg Oral DAILY    HYDROmorphone (DILAUDID) tablet 2-4 mg  2-4 mg Oral Q4H PRN    potassium chloride (KLOR-CON) packet 20 mEq  20 mEq Oral DAILY    polyethylene glycol (MIRALAX) packet 17 g  17 g Oral DAILY    amiodarone (CORDARONE) tablet 200 mg  200 mg Oral DAILY    aspirin chewable tablet 81 mg  81 mg Oral DAILY WITH BREAKFAST    cholecalciferol (VITAMIN D3) tablet 2,000 Units  2,000 Units Oral DAILY    ferrous sulfate tablet 325 mg  1 Tab Oral DAILY WITH BREAKFAST    ascorbic acid (vitamin C) (VITAMIN C) tablet 250 mg  250 mg Oral DAILY WITH BREAKFAST    zinc sulfate (ZINCATE) capsule 220 mg  1 Cap Oral DAILY    atorvastatin (LIPITOR) tablet 40 mg  40 mg Oral QHS    metoprolol tartrate (LOPRESSOR) tablet 25 mg  25 mg Oral Q12H    cyanocobalamin tablet 1,000 mcg  1,000 mcg Oral DAILY    WARFARIN INFORMATION NOTE (COUMADIN)   Other Rx Dosing/Monitoring    ampicillin-sulbactam (UNASYN) 3 g in 0.9% sodium chloride (MBP/ADV) 100 mL MBP  3 g IntraVENous Q6H       Allergies: Allergies   Allergen Reactions    Morphine Other (comments)     Patient gets confused with morphine. Functional Progress:    PHYSICAL THERAPY    ON ADMISSION MOST RECENT   Wheelchair Mobility/Management  Able to Propel (ft): 250 feet  Functional Level: 6  Curbs/Ramps Assist Required (FIM Score): 6 (Modified independent)  Wheelchair Setup Assist Required : 6 (Modified independent)  Wheelchair Management: Manages right brake, Manages left brake Wheelchair Mobility/Management  Able to Propel (ft):  (250 ft)  Functional Level:  (6)  Curbs/Ramps Assist Required (FIM Score): 6 (Modified independent)  Wheelchair Setup Assist Required : 6 (Modified independent)  Wheelchair Management: Manages left brake, Manages right brake     Gait  Amount of Assistance: 4 (Contact guard assistance)  Distance (ft): 40 Feet (ft) (x2 reps)  Assistive Device: Walker, rolling Gait  Amount of Assistance: 4 (Contact guard assistance)  Distance (ft):  (64 ft x 2)  Assistive Device: Walker, rolling     Balance-Sitting/Standing  Sitting - Static: Normal   Sitting - Dynamic: Normal  Standing - Static: Fair, Constant support (with RW)  Standing - Dynamic : Impaired Balance-Sitting/Standing  Sitting - Static: Good (unsupported)  Sitting - Dynamic: Good (unsupported)  Standing - Static: Fair  Standing - Dynamic : Impaired     Bed/Mat Mobility  Rolling Right : 7 (Independent)  Rolling Left : 7 (Independent)  Supine to Sit : 7 (Independent)  Sit to Supine : 7 (Independent) Bed/Mat Mobility  Rolling Right : 7 (Independent)  Rolling Left : 7 (Independent)  Supine to Sit : 7 (Independent)  Sit to Supine : 7 (Independent)     Transfers  Transfer Type:  Other  Other: Stand-step with RW with CGA/SBA  Transfer Assistance : 4 (Contact guard assistance)  Sit to Stand Assistance: Contact guard assistance  Car Transfers: Not tested  Car Type: n/a Transfers  Transfer Type: Other  Other:  (stand step-hop to RW)  Transfer Assistance : 5 (Supervision/setup)  Sit to Stand Assistance: Supervision  Car Transfers: Not tested  Car Type: NA     Steps or Stairs  Steps/Stairs Ambulated (#):  (3 4 inch)  Level of Assist : 0 (Not tested)  Rail Use: Both Steps or Stairs  Steps/Stairs Ambulated (#):  (3 4 inch)  Level of Assist : 4 (Minimal assistance)  Rail Use: Both         Lab/Data Review:  Recent Results (from the past 24 hour(s))   PROTHROMBIN TIME + INR    Collection Time: 08/30/17  6:00 AM   Result Value Ref Range    Prothrombin time 22.8 (H) 11.5 - 15.2 sec    INR 2.1 (H) 0.8 - 1.2         Estimated Glomerular Filtration Rate:  On admission, estimated GFR based on a Creatinine of 0.62 mg/dl:   Using CKD-EPI = 108.6 mL/min/1.73m2   Using MDRD = 141.1 mL/min/1.73m2  Most recent estimated GFR, based on a Creatinine of 0.60 mg/dl on 8/28/2017:   Using CKD-EPI = 110.1 mL/min/1.73m2   Using MDRD = 146.6 mL/min/1.73m2       Assessment:     Primary Rehabilitation Diagnosis  1. Impaired Mobility and ADLs  2. S/P Left above-the-knee amputation (8/18/2017 - Dr. Areli Canela)  3.  History of ischemic rest pain of the left lower extremity due to severe peripheral vascular disease      Comorbidities   Benign hypertensive heart disease with systolic congestive heart failure, NYHA class 2  I11.0, I50.20    DDD (degenerative disc disease), lumbar M51.36    Erectile dysfunction N52.9    Spinal stenosis of lumbar region with radiculopathy M48.06, M54.16    Hereditary peripheral neuropathy G60.9    Aortoiliac occlusive disease  I74.09    Atherosclerosis of native artery of both lower extremities with intermittent claudication  I70.213    Peripheral artery disease  I73.9    Coronary artery disease involving native coronary artery of native heart I25.10    Paroxysmal atrial fibrillation  I48.0    Acute blood loss as cause of postoperative anemia D62    Anticoagulated on Coumadin Z51.81, Z79.01    Ischemic cardiomyopathy I25.5    Chronic systolic heart failure  O66.26    Carotid artery disease  I77.9    Dyslipidemia E78.5    Euthyroid sick syndrome E07.81    Aftercare following surgery of the circulatory system Z48.812    Status post femoral-popliteal bypass surgery Z95.828    History of cardioversion Z98.890    Critical ischemia of right lower extremity I99.8    History of vitamin D deficiency Z86.39    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    Infection of vascular bypass graft  T82. 7XXA    Chronic anemia D64.9    Chronic ulcer of right heel (HCC) L97.419    Prolonged Q-T interval on ECG R94.31    Aftercare for amputation stump Z47.81    Moderate to severe pulmonary hypertension  I27.2    Adverse effect of amiodarone T46.2X5A    Skin ulcer of malleolar area of right ankle  L97.319         Plan:     1. Justification for continued stay: Good progression towards established rehabilitation goals. 2. Medical Issues being followed closely:    [x]  Fall and safety precautions     [x]  Wound Care     [x]  Bowel and Bladder Function     [x]  Fluid Electrolyte and Nutrition Balance     [x]  Pain Control      3. Issues that 24 hour rehabilitation nursing is following:    [x]  Fall and safety precautions     [x]  Wound Care     [x]  Bowel and Bladder Function     [x]  Fluid Electrolyte and Nutrition Balance     [x]  Pain Control      [x]  Assistance with and education on in-room safety with transfers to and from the bed, wheelchair, toilet and shower. 4. Acute rehabilitation plan of care:    [x]  Continue current care and rehab. [x]  Physical Therapy           [x]  Occupational Therapy           []  Speech Therapy     []  Hold Rehab until further notice     5. Medications:    [x]  MAR Reviewed     [x]  Continue Present Medications     6.  DVT Prophylaxis:      []  Lovenox     []  Unfractionated Heparin     [x]  Coumadin     []  NOAC     []  VASQUEZ Stockings     []  Sequential Compression Device     []  None     7. Orders:   > S/P Left above-the-knee amputation (8/18/2017 - Dr. Fanta High); History of ischemic rest pain of the left lower extremity due to severe peripheral artery disease   > Wound vac dressing changes by staff nurses every Monday, Wednesday, & Friday on day shift. Measure wound size & document with each dressing change. Settings: 125mmHG low continuous suction. Measure wound size & document with each dressing change. Change canister when 3/4 full. May use saline dressings daily until wound vac initiated.   > Mepilex Border dressings to left AKA staple line q2days & prn soilage. Wrap with ace wrap in figure 8 form.   > Staples to be removed on 9/15/2017    > S/P Removal of infected graft; Repair of left superficial femoral artery; Repair of left common femoral artery (7/25/2017 - Dr. Fanta High);  History of sepsis associated with infection of vascular bypass graft   > Continue Ampicillin Sulbactam 3 grams IVPB q 6 hr (STOP DATE: 9/19/2017)    > Acute Postoperative Blood Loss Anemia   > Hgb/Hct (8/24/2017, prior to admission to the ARU) = 8.4/25.6   > Anemia work-up (8/22/2017) showed serum iron 15, TIBC 146, iron % saturation 10   > Hgb/Hct (8/26/2017, on admission) = 8.6/26.5   > Reticulocyte count (8/26/2017) = 4.7   > Ferritin (8/26/2017) = 1066   > Hgb/Hct (8/28/2017) = 9.0/27.9    > Continue:    > FeSO4 325 mg PO once daily with breakfast     > Ascorbic Acid 250 mg PO once daily with breakfast (to enhance the absorption of the FeSO4)     > Benign hypertensive heart disease with chronic systolic heart failure   > On 8/26/2017, increased Losartan from 50 mg to 100 mg PO once daily (6AM)   > Continue:    > Furosemide 20 mg PO once daily (6AM)    > Losartan 100 mg PO once daily (6AM)    > Metoprolol tartrate 25 mg PO q 12 hr (9AM, 9PM)    > Paroxysmal atrial fibrillation, presently normal sinus rhythm, anticoagulated on Coumadin   > Amiodarone 200 mg PO once daily   > Metoprolol tartrate 25 mg PO q 12 hr (9AM, 9PM)   > Heparin 5,000 units SC q 8 hr until INR is greater than 2.0 for 2 determinations   > Target INR = 2 to 3   > INR 2.1   > Patient received Coumadin 5 mg PO last night   > Coumadin 5 mg PO tonight    > Chronic ulcer of right heel    > Apply rigid heel suspension boots on both feet when supine in bed   > Podiatry follow-up (Dr. Harry Angeles) called by Vascular Surgery for comanagement   > Arterial duplex ultrasound (8/29/2017) showed patent common femoral to popliteal artery bypass graft without evidence of a hemodynamically significant stenosis. > Continue:    > Ascorbic acid 250 mg PO once daily with breakfast    > Zinc sulfate 220 mg PO once daily    > Skin ulcer of malleolar area of right ankle   > Cleanse wound with saline, apply Aquacel ag dressing, Mepilex Border to right lateral ankle wound every 48hrs & prn soilage. > Analgesia   > Continue:    > Acetaminophen 650 mg PO q 4 hr PRN for pain level less than 5/10    > Hydromorphone 2-4 mg PO q 4 hr PRN for pain level greater than 4/10      8. Patient's progress in rehabilitation and medical issues discussed with the patient. All questions answered to the best of my ability. Care plan discussed with patient and nurse.       Signed:    Samson Mosquera MD    August 30, 2017

## 2017-08-30 NOTE — ROUTINE PROCESS
SHIFT CHANGE NOTE FOR [facility]    Bedside and Verbal shift change report given to Marjorie Newman RN (oncoming nurse) by Kerry Goncalves RN   (offgoing nurse). Report included the following information SBAR, Kardex, MAR and Recent Results. Situation:   Code Status: Full Code   Reason for Admission: AKA  Hospital Day: 5   Problem List:   Hospital Problems  Date Reviewed: 8/30/2017          Codes Class Noted POA    Skin ulcer of malleolar area of right ankle ([facility] (Chronic) ICD-10-CM: L97.319  ICD-9-CM: 707.13  Unknown Yes        * (Principal)Status post above knee amputation of left lower extremity ([facility] ICD-10-CM: Z26.437  ICD-9-CM: V49.76  8/18/2017 Yes    Overview Signed 8/21/2017 10:21 PM by Griselda Kennedy MD     S/P Left above-the-knee amputation (8/18/2017 - Dr. Jennifer Kent)               Aftercare for amputation stump ICD-10-CM: Z47.81  ICD-9-CM: V54.89  8/18/2017 Yes    Overview Signed 8/21/2017 10:25 PM by Griselda Kennedy MD     S/P Left above-the-knee amputation (8/18/2017 - Dr. Jennifer Kent)             Ischemic rest pain of lower extremity ([facility] ICD-10-CM: I73.9  ICD-9-CM: 443.9  8/14/2017 Yes    Overview Signed 8/15/2017 11:09 AM by Griselda Kennedy MD     Left             Chronic ulcer of right heel ([facility] (Chronic) ICD-10-CM: L97.419  ICD-9-CM: 707.14  8/8/2017 Yes        Acute blood loss as cause of postoperative anemia ICD-10-CM: D62  ICD-9-CM: 285.1  7/25/2017 Yes        Aftercare following surgery of the circulatory system ICD-10-CM: Z48.812  ICD-9-CM: V58.73  7/25/2017 Yes    Overview Addendum 8/21/2017 10:22 PM by Griselda Kennedy MD     S/P Removal of infected graft; Repair of left superficial femoral artery; Repair of left common femoral artery (7/25/2017 - Dr. Jennifer Kent); S/P Right axillary femoral jump bypass interposition; Removal of infected right axillary femoral bypass;  Wound VAC placement of upper thigh 1.2 cm x 5.2 cm x 1.8 cm and groin 4 cm x 0.5 cm x 0.2 cm (8/18/2017 - Dr. Garrett Pompa)             Impaired mobility and ADLs ICD-10-CM: Z74.09  ICD-9-CM: 799.89  7/24/2017 Yes        Infection of vascular bypass graft [facility]) ICD-10-CM: T82. 7XXA  ICD-9-CM: 996.62  7/24/2017 Yes    Overview Signed 8/7/2017  2:19 PM by Chandrakant Hernandez MD     Left lower extremity             Anticoagulated on Coumadin (Chronic) ICD-10-CM: Z51.81, Z79.01  ICD-9-CM: V58.83, V58.61  Unknown Yes        Paroxysmal atrial fibrillation (HCC) ICD-10-CM: I48.0  ICD-9-CM: 427.31  Unknown Yes        Peripheral artery disease ([facility] (Chronic) ICD-10-CM: I73.9  ICD-9-CM: 443.9  1/25/2017 Yes        Benign hypertensive heart disease with systolic congestive heart failure, NYHA class 2 (HCC) (Chronic) ICD-10-CM: I11.0, I50.20  ICD-9-CM: 402.11, 428.20, 428.0  1/26/2015 Yes              Background:   Past Medical History:   Past Medical History:   Diagnosis Date    Acute blood loss as cause of postoperative anemia 4/4/2017    Anticoagulated on Coumadin     Aortoiliac occlusive disease ([facility] 1/25/2017    Atherosclerosis of native artery of both lower extremities with intermittent claudication ([facility] 1/25/2017    Benign hypertensive heart disease with systolic congestive heart failure, NYHA class 2 ([address] 1/26/2015    Carotid artery disease (HCC)     Chronic anemia     Chronic systolic heart failure (HCC)     Chronic ulcer of right foot ([address] 4/4/2017    Chronic ulcer of right heel ([facility] 8/8/2017    Coronary artery disease involving native coronary artery of native heart 3/15/2017    Successful stenting of Cx (Xience LESLEY) and RCA (Xience LESLEY) to 0% by Dr. Felix Roman on 3/15/17.     DDD (degenerative disc disease), lumbar 1/26/2015    Dyslipidemia     Erectile dysfunction 7/5/2016    Euthyroid sick syndrome 4/6/2017    Hereditary peripheral neuropathy 11/15/2016    History of cardioversion 4/18/2017    S/P Synchronized external cardioversion (4/18/2017 - Dr. Anne Nguyen)    History of vitamin D deficiency 4/22/2017    Vitamin D 25-Hydroxy (4/22/2017) = 12.0; Vitamin D 25-Hydroxy (5/26/2017) = 38.7    Infection of vascular bypass graft ([facility] 7/24/2017    Left lower extremity    Ischemic cardiomyopathy     Moderate to severe pulmonary hypertension ([facility] 7/23/2017    Paroxysmal atrial fibrillation (HCC)     Peripheral artery disease ([facility] 1/25/2017    Skin ulcer of malleolar area of right ankle ([facility]     Spinal stenosis of lumbar region with radiculopathy 5/4/2015    Dr. Lidia Batista      Patient taking anticoagulants yes   Patient has a defibrillator: no     Assessment:   Changes in Assessment throughout shift: no     Patient has central line:yes Reasons if yes: long term antibiotic  Insertion date:08/24/17 Last dressing date:08/24/17   Patient has Jiang Cath: no Reasons if yes:     Insertion date:     Last Vitals:     Vitals:    08/29/17 2000 08/30/17 0517 08/30/17 0810 08/30/17 1610   BP: 154/73 142/70 138/70 120/63   Pulse: 64 62 88 62   Resp: 16  18 18   Temp: 98 °F (36.7 °C)  96.8 °F (36 °C) 97.3 °F (36.3 °C)   SpO2: 100%  100% 100%   Weight:       Height:            PAIN    Pain Assessment    Pain Intensity 1: 0 (08/30/17 1610) Pain Intensity 1: 2 (12/29/14 1105)    Pain Location 1: Leg Pain Location 1: Abdomen    Pain Intervention(s) 1: Medication (see MAR) Pain Intervention(s) 1: Medication (see MAR)  Patient Stated Pain Goal: 0 Patient Stated Pain Goal: 0  o Intervention effective: no    o Other actions taken for pain: pain medicine     Skin Assessment  Skin color Skin Color: Appropriate for ethnicity  Condition/Temperature Skin Condition/Temp: Warm  Integrity Skin Integrity: Incision (comment), Wound (add Wound LDA)  Turgor Turgor: Non-tenting  Weekly Pressure Ulcer Documentation  Pressure  Injury Documentation: No Pressure Injury Noted-Pressure Ulcer Prevention Initiated  Wound Prevention & Protection Methods  Orientation of wound Orientation of Wound Prevention: Posterior  Location of Prevention Location of Wound Prevention: Sacrum/Coccyx  Dressing Present Dressing Present : No  Dressing Status Dressing Status: Intact  Wound Offloading Wound Offloading (Prevention Methods): Bed, pressure redistribution/air, Chair cushion, Wheelchair     INTAKE/OUPUT    Date 08/29/17 0700 - 08/30/17 0659 08/30/17 0700 - 08/31/17 0659   Shift 0700-1859 1900-0659 24 Hour Total 0700-1859 1900-0659 24 Hour Total   I  N  T  A  K  E   P.O. 660  660 480  480      P. O. 660  660 480  480    Shift Total  (mL/kg) 660  (9.3)  660  (9.3) 480  (6.8)  480  (6.8)   O  U  T  P  U  T   Urine  (mL/kg/hr) 750  (0.9) 2350  (2.8) 3100  (1.8) 1400  1400      Urine Voided 750 2350 3100 1400  1400      Urine Occurrence(s) 3 x 5 x 8 x 3 x  3 x    Stool            Stool Occurrence(s) 1 x 0 x 1 x 1 x  1 x    Shift Total  (mL/kg) 750  (10.6) 2350  (33.2) 3100  (43.8) 1400  (19.8)  1400  (19.8)   NET -90 -7560 -2440 -920  -920   Weight (kg) 70.8 70.8 70.8 70.8 70.8 70.8       Recommendations:  1. Patient needs and requests: pain medicine    2. Diet: cardiac    3. Pending tests/procedures: no     4. Functional Level/Equipment: 1 x person assist    5. Estimated Discharge Date: TDB Posted on Whiteboard in Patients Room: no     [facility] Safety Check    A safety check occurred in the patient's room between off going nurse and oncoming nurse listed above.     The safety check included the below items  Area Items   H  High Alert Medications - Verify all high alert medication drips (heparin, PCA, etc.)   E  Equipment - Suction is set up for ALL patients (with yanker)  - Red plugs utilized for all equipment (IV pumps, etc.)  - WOWs wiped down at end of shift.  - Room stocked with oxygen, suction, and other unit-specific supplies   A  Alarms - Bed alarm is set for fall risk patients  - Ensure chair alarm is in place and activated if patient is up in a chair   L  Lines - Check IV for any infiltration  - Jiang bag is empty if patient has a Jiang   - Tubing and IV bags are labeled   S  Safety   - Room is clean, patient is clean, and equipment is clean. - Hallways are clear from equipment besides carts. - Fall bracelet on for fall risk patients  - Ensure room is clear and free of clutter  - Suction is set up for ALL patients (with yanker)  - Hallways are clear from equipment besides carts.    - Isolation precautions followed, supplies available outside room, sign posted

## 2017-08-30 NOTE — PROGRESS NOTES
Problem: Self Care Deficits Care Plan (Adult)  Goal: *Therapy Goal (Edit Goal, Insert Text)  Long Term Goals (to be met upon discharge date) in order to increase pts functional independence and safety, and decrease burden of care:  1. Pt will perform self-feeding with independence. 2. Pt will perform grooming with independent. 3. Pt will perform UB bathing with independence. 4. Pt will perform LB bathing with Mod I.  5. Pt will perform tub/shower transfer with Mod I.   6. Pt will perform UB dressing with independence. 7. Pt will perform LB dressing with Mod Iindependence. 8. Pt will perform toileting task with Mod independence. 9. Pt will perform toilet transfer with Mod I. Short Term Weekly Goals for (17 to 17) in order to increase pts functional independence and safety, and decrease burden of care:  3. Pt will perform UB bathing with independence. 4. Pt will perform LB bathing with Supervision. 5. Pt will perform tub/shower transfer with supervision. 6. Pt will perform UB dressing with Supervision. 7. Pt will perform LB dressing with Supervision. 8. Pt will perform toileting task with Supervision. 9. Pt will perform toilet transfer with Supervision. OCCUPATIONAL THERAPY DAILY NOTE  Patient Name:Mj Moya  Time In: 56  Time Out: 36     Medical Diagnosis:  Left AKA  Status post above knee amputation of left lower extremity ([facility] [address] Status post above knee amputation of left lower extremity (HCC)      Pain at start of tx:0/10  Pain at stop of tx:0/10     Patient identified with name and :yes*  Subjective: Pt denies having pain. Objective: Pt seen for therapeutic activity        THERAPEUTIC ACTIVITY Daily Assessment     Pt participated with clothes pin tree activity with two pound wrist weight. Pt demonstrated shoulder flexion reaching overhead strengthening UE.  Pt wring water from wash cloth placing wash cloth on clothes pin tree fold in half pinning wash cloth with resistive pins of various saavedra. Then retrieved pins and wash cloth from clothes pins tree demonstrating shoulder flexion ~ 160 deg. In standing with rolling walker for 9:01 minutes, CGA, pt demonstrate increase activity standing tolerance. pt participated with checkers game with one pound wrist weight on wrists. Focus on standing tolerance with reaching without LOB. Mateo Paz THERAPEUTIC EXERCISE Daily Assessment      UE strengthening with 3# dowel, patient preforms 2x10 rep in all plane then challenge with one set of reps in all plane without rest break. MOBILITY/TRANSFERS Daily Assessment       Functional Transfers  Tub  Shower combination Type: Shower  Amount of Assistance Required: 4 (Contact guard assistance swinging LE into and out of tub. Adaptive Equipment: Grab bars      Assessment: Pt demonstrated increase activity standing tolerance with therapeutic activity.      Plan of Care: Con't plan of to reach [address] [place]/L  8/29/2017

## 2017-08-30 NOTE — PROGRESS NOTES
Problem: Mobility Impaired (Adult and Pediatric)  Goal: *Acute Goals and Plan of Care (Insert Text)  Physical Therapy Short Term Goals  Initiated 2017 and to be accomplished within 7 day(s) 2017  1. Patient will move from supine to sit and sit to supine , scoot up and down and roll side to side in bed with independence. 2. Patient will transfer from bed to chair and chair to bed with supervision/set-up using the least restrictive device. 3. Patient will perform sit to stand with supervision/set-up. 4. Patient will ambulate with supervision/set-up for 50 feet with the least restrictive device. 5. Patient will ascend/descend 3 stairs with 2 handrail(s) with minimal assistance/contact guard assist.    Physical Therapy Long Term Goals  Initiated 2017 and to be accomplished within 14 day(s) 2017  1. Patient will move from supine to sit and sit to supine , scoot up and down and roll side to side in bed with independence. 2. Patient will transfer from bed to chair and chair to bed with modified independence using the least restrictive device. 3. Patient will perform sit to stand with modified independence. 4. Patient will ambulate with modified independence for 50 feet with the least restrictive device. 5. Patient will ascend/descend 3 stairs with 2 handrail(s) with supervision/set-up. PHYSICAL THERAPY DAILY NOTE  Patient Name:Mj Aguilar  Time In: 68  Time Out: 1600  Patient Seen For: PM;Therapeutic exercise;Transfer training  Diagnosis: Left AKA  Status post above knee amputation of left lower extremity ([facility] [address] Status post above knee amputation of left lower extremity ([facility])  Precautions: Left LE wound/wound vac     Subjective: Oh I'm just fine. I'm ready for whatever, yes sir.      Pain at start of tx:0/10  Pain at stop of tx:1/10     Patient identified with name and :YES         Objective: Pt performed prone lying stretch to Left hip flexor with pillows x2 under stomach for 5 minutes and prone glute sets. Pt reported feeling slight discomfort from wound vac but tolerable. Pt required intermittent rest breaks due to fatigue and loss of breath. GROSS ASSESSMENT Daily Assessment     AROM: Within functional limits  PROM: Within functional limits  Strength: Within functional limits  Coordination: Within functional limits  Tone: Normal  Sensation: Intact          BED/MAT MOBILITY Daily Assessment     Rolling Right : 7 (Independent)  Rolling Left : 7 (Independent)  Supine to Sit : 7 (Independent)  Sit to Supine : 7 (Independent)          TRANSFERS Daily Assessment     Transfer Type: Other  Other: stand hop step  Transfer Assistance : 6 (Modified independent) (wound vac management)  Sit to Stand Assistance: Supervision  Car Transfers: Not tested  Car Type: NA          GAIT Daily Assessment     Amount of Assistance: 4 (Contact guard assistance)  Distance (ft):  (64 ft x 2)  Assistive Device: Walker, rolling          STEPS or STAIRS Daily Assessment                BALANCE Daily Assessment     Sitting - Static: Good (unsupported)  Sitting - Dynamic: Good (unsupported)  Standing - Static: Fair  Standing - Dynamic : Impaired          WHEELCHAIR MOBILITY Daily Assessment     Able to Propel (ft):  (250 ft)  Functional Level:  (6)  Curbs/Ramps Assist Required (FIM Score): 6 (Modified independent)  Wheelchair Setup Assist Required : 6 (Modified independent)  Wheelchair Management: Manages left brake;Manages right brake          LOWER EXTREMITY EXERCISES Daily Assessment     Extremity: Right; Both  Exercise Type #1: Other (comment) (Prone hip flexer stretch, glute sets, S/L abd)  Sets Performed: 3  Reps Performed: 10  Level of Assist: Supervision  Exercise Type #2: Seated lower extremity strengthening (bar hip flex)  Sets Performed: 3  Reps Performed: 10  Level of Assist: Supervision  Exercise Type #3:  (Supine L hip)  Sets Performed: 2  Reps Performed: 20  Level of Assist: Supervision Assessment: Pt shows shows Loss of breath from supine and general weakness. Pt improved with w/c to bed transfers without AD. Plan of Care: Continue with stretching and strengthening ex in order to improve pt function at current state per poc.      Maria Teresa Mary  8/30/2017

## 2017-08-30 NOTE — PROGRESS NOTES
Problem: Mobility Impaired (Adult and Pediatric)  Goal: *Acute Goals and Plan of Care (Insert Text)  Physical Therapy Short Term Goals  Initiated 2017 and to be accomplished within 7 day(s) 2017  1. Patient will move from supine to sit and sit to supine , scoot up and down and roll side to side in bed with independence. 2. Patient will transfer from bed to chair and chair to bed with supervision/set-up using the least restrictive device. 3. Patient will perform sit to stand with supervision/set-up. 4. Patient will ambulate with supervision/set-up for 50 feet with the least restrictive device. 5. Patient will ascend/descend 3 stairs with 2 handrail(s) with minimal assistance/contact guard assist.    Physical Therapy Long Term Goals  Initiated 2017 and to be accomplished within 14 day(s) 2017  1. Patient will move from supine to sit and sit to supine , scoot up and down and roll side to side in bed with independence. 2. Patient will transfer from bed to chair and chair to bed with modified independence using the least restrictive device. 3. Patient will perform sit to stand with modified independence. 4. Patient will ambulate with modified independence for 50 feet with the least restrictive device. 5. Patient will ascend/descend 3 stairs with 2 handrail(s) with supervision/set-up. Outcome: Progressing Towards Goal  PHYSICAL THERAPY DAILY NOTE  Patient Name:Mj Weinstein   Time in: 9:30  Time out: 11:00     Diagnosis: Left AKA  Status post above knee amputation of left lower extremity ([address] Status post above knee amputation of left lower extremity ([facility])  Precautions: Falls, safety     Subjective: I was so glad to be scheduled for a shower, but I had to have an US instead.      Pain at start of tx:310  Pain at stop of tx:210     Patient identified with name and :yes                GROSS ASSESSMENT Daily Assessment     AROM: Within functional limits  PROM: Within functional limits  Strength: Within functional limits  Coordination: Within functional limits  Tone: Normal  Sensation: Intact          BED/MAT MOBILITY Daily Assessment     Rolling Right : 7 (Independent)  Rolling Left : 7 (Independent)  Supine to Sit : 7 (Independent)  Sit to Supine : 7 (Independent)          TRANSFERS Daily Assessment     Transfer Type: Other  Other:  (stand step-hop to RW)  Transfer Assistance : 5 (Supervision/setup)  Sit to Stand Assistance: Supervision  Car Transfers: Not tested  Car Type: NA Pt requires VCs to focus on transfers using good safety technique as he is easily distracted this AM transferring from [facility] to mat table. He is able to regain focus and complete transfers with adequate safety awareness. Continue to monitor for safety deficits. GAIT Daily Assessment     Amount of Assistance: 4 (Contact guard assistance)  Distance (ft):  (64 ft x 2)  Assistive Device: Walker, rolling Pt required VCs and TCs for safety and balance training outdoors with uneven surfaces and for management of canister of wound vac. BALANCE Daily Assessment     Sitting - Static: Good (unsupported)  Sitting - Dynamic: Good (unsupported)  Standing - Static: Fair  Standing - Dynamic : Impaired Pt required VCs and TCs for safety and balance with uneven surface training. Wound vac canister was troublesome today with uneven surfaces as it makes the RW top heavy and the RW more difficult to manage. Therapist was able to assist patient with the canister management and his balance improved to fair with the RW.           WHEELCHAIR MOBILITY Daily Assessment     Able to Propel (ft):  (250 ft)  Functional Level:  (6)  Curbs/Ramps Assist Required (FIM Score): 6 (Modified independent)  Wheelchair Setup Assist Required : 6 (Modified independent)  Wheelchair Management: Manages left brake;Manages right brake          LOWER EXTREMITY EXERCISES Daily Assessment     Extremity: Right  Exercise Type #1: Supine lower extremity strengthening  Sets Performed: 3  Reps Performed: 15  Level of Assist: Supervision  Exercise Type #2: Seated lower extremity strengthening  Sets Performed: 3  Reps Performed: 15  Level of Assist: Supervision  Exercise Type #3:  (Supine L hip)  Sets Performed: 2  Reps Performed: 20  Level of Assist: Supervision Pt required VCs and TCs for posture and alignment with sidelying hip abduction and extension. Assessment: Pt is tolerating sessions well, he has made good progress with ambulation with the RW using the hop to sequencing pattern. Plan of Care: Continue with current POC. Focus on safety and management of canister for wound vac.      Linette [facility]  8/30/2017

## 2017-08-30 NOTE — PROGRESS NOTES
Problem: Falls - Risk of  Goal: *Absence of Falls  Document Antonio Fall Risk and appropriate interventions in the flowsheet.    Outcome: Progressing Towards Goal  Fall Risk Interventions:  Mobility Interventions: Bed/chair exit alarm     Mentation Interventions: Adequate sleep, hydration, pain control     Medication Interventions: Evaluate medications/consider consulting pharmacy     Elimination Interventions: Bed/chair exit alarm     History of Falls Interventions: Bed/chair exit alarm, Room close to nurse's station

## 2017-08-30 NOTE — PROGRESS NOTES
Problem: Self Care Deficits Care Plan (Adult)  Goal: *Therapy Goal (Edit Goal, Insert Text)  Long Term Goals (to be met upon discharge date) in order to increase pts functional independence and safety, and decrease burden of care:  1. Pt will perform self-feeding with independence. 2. Pt will perform grooming with independent. 3. Pt will perform UB bathing with independence. 4. Pt will perform LB bathing with Mod I.  5. Pt will perform tub/shower transfer with Mod I.   6. Pt will perform UB dressing with independence. 7. Pt will perform LB dressing with Mod Iindependence. 8. Pt will perform toileting task with Mod independence. 9. Pt will perform toilet transfer with Mod I. Short Term Weekly Goals for (17 to 17) in order to increase pts functional independence and safety, and decrease burden of care:  3. Pt will perform UB bathing with independence. 4. Pt will perform LB bathing with Supervision. 5. Pt will perform tub/shower transfer with supervision. 6. Pt will perform UB dressing with Supervision. 7. Pt will perform LB dressing with Supervision. 8. Pt will perform toileting task with Supervision. 9. Pt will perform toilet transfer with Supervision. OCCUPATIONAL THERAPY DAILY NOTE  Patient Name:Mj Espinal  Time Spent With Patient  Time In: 1330  Time Out: 0     Medical Diagnosis:  Left AKA  Status post above knee amputation of left lower extremity ([facility] [address] Status post above knee amputation of left lower extremity (HCC)      Pain at start of tx:0/10  Pain at stop of tx:0/10     Patient identified with name and :yes  Subjective: Pt denies having pain.  'I feel like a new person' after shower     Objective:Pt seen for PM shower see below for ADL's functional levels            FEEDING/EATING Daily Assessment     Feeding/Eating  Feeding/Eating Assistance: 6 (Modified independent)       GROOMING Daily Assessment     Grooming  Grooming Assistance : 7 (Independent)  Comments: From wheel chair level       UPPER BODY BATHING Daily Assessment     Upper Body Bathing  Bathing Assistance, Upper: 6 (Modified independent)  Position Performed: Seated in chair       LOWER BODY BATHING Daily Assessment     Lower Body Bathing  Bathing Assistance, Lower : 6 (Modified independent)  Position Performed: Seated in chair pt weight shift on shower bench washing buttocks. UPPER BODY DRESSING Daily Assessment     Upper Body Dressing   Dressing Assistance : 7 (Independent)  Comments: From wheel chair level       LOWER BODY DRESSING Daily Assessment     Lower Body Dressing   Dressing Assistance : 4 (Contact guard assistance)  Leg Crossed Method Used: No  Position Performed: Seated in chair;Standing  Comments: Pt bringing R LE toward chest donning sock then disconnecting wound vac treading LE and stump into pants legs. In standing, supporting self on bedside rails pt jasmeet pants over hips/ buttocks with CGA for support/ balance. MOBILITY/TRANSFERS Daily Assessment     Functional Transfers  Tub or Shower Type: Shower  Amount of Assistance Required: 4 (Contact guard assistance)  Adaptive Equipment: Pt use grab bars for support with stand pivot <> wheel chair. Assessment: Pt progressing with self care demonstrating mod independent with self care at wheel chair level.      Plan of Care: Con't plan of care      Shanon Mcdermott  8/30/2017

## 2017-08-30 NOTE — PROCEDURES
[facility]  *** [facility] ***    Name: Bonifacio Peterson  MRN: [id number]    Inpatient  : 22 Mar 1960  HIS Order #: [id number]  [id number] [facility] [place] Visit #: [id number]  Date: 30 Aug 2017    TYPE OF TEST: Extremity Arterial Duplex    REASON FOR TEST  Extremity ulceration (right side), Leg pain                            Right                     Left  Artery               PSV   Finding             PSV   Finding  ------------------  -----  ---------------    -----  ---------------  External iliac:     146.0  Common femoral:     130.0  Profunda femoris:  Proximal SFA:        47.0  Mid SFA:             48.0  Distal SFA:          53.0  Popliteal:           60.0  Anterior tibial:     74.0  Mild stenosis  Posterior tibial:    60.0  Occluded    Pressures               Right     Left               -----     -----     Brachial:   140       150           DP:    70           PT:    80            MARINA:  0.53            Toe: INTERPRETATION/FINDINGS  Duplex images were obtained using 2-D gray scale, color flow, and  spectral Doppler analysis. Right leg :  1. Patent common femoral to popliteal artery bypass graft without  evidence of a hemodynamically significant stenosis. 2. Heavily calcified posterior tibial, peroneal, and anterior tibial  arteries with noted monophasic doppler signals. 3 The right ankle/brachial index is 0.53. ADDITIONAL COMMENTS    I have personally reviewed the data relevant to the interpretation of  this  study. TECHNOLOGIST: Kat Rivas RVT  Signed: 2017 12:30 PM    PHYSICIAN: Joby Badillo MD  Signed: 2017 02:16 PM

## 2017-08-30 NOTE — PROGRESS NOTES
PHYSICAL THERAPY DAILY NOTE  Patient Name:Mj Newman     Diagnosis: Left AKA  Status post above knee amputation of left lower extremity ([address] Status post above knee amputation of left lower extremity ([facility])  Precautions: Falls, safety    Subjective: I wanted to get my shower this morning, but I had to have a US and now I'm hooked up to this IV. Pain at start of tx:3/10  Pain at stop of tx:2/10    Patient identified with name and :Yes       Objective: Pt participates in skilled session for PT to address deficits in transfers, safety, mobility, strength and functional independence. GROSS ASSESSMENT Daily Assessment    AROM: Within functional limits  PROM: Within functional limits  Strength: Within functional limits  Coordination: Within functional limits  Tone: Normal  Sensation: Intact       BED/MAT MOBILITY Daily Assessment    Rolling Right : 7 (Independent)  Rolling Left : 7 (Independent)  Supine to Sit : 7 (Independent)  Sit to Supine : 7 (Independent)       TRANSFERS Daily Assessment    Transfer Type:  Other  Other:  (stand step-hop to RW)  Transfer Assistance : 5 (Supervision/setup)  Sit to Stand Assistance: Supervision  Car Transfers: Not tested  Car Type: NA         WHEELCHAIR MOBILITY Daily Assessment    Able to Propel (ft):  (250 ft)  Functional Level:  (6)  Curbs/Ramps Assist Required (FIM Score): 6 (Modified independent)  Wheelchair Setup Assist Required : 6 (Modified independent)  Wheelchair Management: Manages left brake;Manages right brake       LOWER EXTREMITY EXERCISES Daily Assessment    Extremity: Right  Exercise Type #1: Supine lower extremity strengthening  Sets Performed: 3  Reps Performed: 15  Level of Assist: Supervision  Exercise Type #2: Seated lower extremity strengthening  Sets Performed: 3  Reps Performed: 15  Level of Assist: Supervision  Exercise Type #3:  (Supine L hip)  Sets Performed: 2  Reps Performed: 20  Level of Assist: Supervision          Assessment: Pt is making gains with strength and mobility. He continues to require VCs and TCing for sidelying exercises. Plan of Care: Continue with POC and FOCUS on safety and management of canister of wound vac while ambulating with the RW.   Mayela Hernandez  8/30/2017

## 2017-08-30 NOTE — PROGRESS NOTES
Problem: Falls - Risk of  Goal: *Absence of Falls  Document Antonio Fall Risk and appropriate interventions in the flowsheet.    Outcome: Progressing Towards Goal  Fall Risk Interventions:  Mobility Interventions: Bed/chair exit alarm, Communicate number of staff needed for ambulation/transfer, Mechanical lift     Mentation Interventions: Adequate sleep, hydration, pain control, Bed/chair exit alarm, Door open when patient unattended, Evaluate medications/consider consulting pharmacy     Medication Interventions: Bed/chair exit alarm     Elimination Interventions: Bed/chair exit alarm, Elevated toilet seat     History of Falls Interventions: Bed/chair exit alarm, Consult care management for discharge planning, Door open when patient unattended, Evaluate medications/consider consulting pharmacy, Room close to nurse's station

## 2017-08-30 NOTE — ROUTINE PROCESS
SHIFT CHANGE NOTE FOR [facility]    Bedside and Verbal shift change report given to Taz Vázquez RN (oncoming nurse) by Stefano Blandon RN   (offgoing nurse). Report included the following information SBAR, Kardex, MAR and Recent Results. Situation:   Code Status: Full Code   Reason for Admission: [facility]  Hospital Day: 5   Problem List:   Hospital Problems  Date Reviewed: 8/29/2017          Codes Class Noted POA    Skin ulcer of malleolar area of right ankle ([facility] [address] (Chronic) ICD-10-CM: L97.319  ICD-9-CM: 707.13  Unknown Yes        * (Principal)Status post above knee amputation of left lower extremity ([facility] [address] ICD-10-CM: F41.226  ICD-9-CM: V49.76  8/18/2017 Yes    Overview Signed 8/21/2017 10:21 PM by Jennie Thompson MD     S/P Left above-the-knee amputation (8/18/2017 - Dr. Fanta High)               Aftercare for amputation stump ICD-10-CM: Z47.81  ICD-9-CM: V54.89  8/18/2017 Yes    Overview Signed 8/21/2017 10:25 PM by Jennie Thompson MD     S/P Left above-the-knee amputation (8/18/2017 - Dr. Fanta High)             Ischemic rest pain of lower extremity ([facility] [address] ICD-10-CM: I73.9  ICD-9-CM: 443.9  8/14/2017 Yes    Overview Signed 8/15/2017 11:09 AM by Jennie Thompson MD     Left             Chronic ulcer of right heel ([facility] (Chronic) ICD-10-CM: L97.419  ICD-9-CM: 707.14  8/8/2017 Yes        Acute blood loss as cause of postoperative anemia ICD-10-CM: D62  ICD-9-CM: 285.1  7/25/2017 Yes        Aftercare following surgery of the circulatory system ICD-10-CM: Z48.812  ICD-9-CM: V58.73  7/25/2017 Yes    Overview Addendum 8/21/2017 10:22 PM by Jennie Thompson MD     S/P Removal of infected graft; Repair of left superficial femoral artery; Repair of left common femoral artery (7/25/2017 - Dr. Fanta High); S/P Right axillary femoral jump bypass interposition; Removal of infected right axillary femoral bypass;  Wound VAC placement of upper thigh 1.2 cm x 5.2 cm x 1.8 cm and groin 4 cm x 0.5 cm x 0.2 cm (8/18/2017 - Dr. Jayla Mondragon)             Impaired mobility and ADLs ICD-10-CM: Z74.09  ICD-9-CM: 799.89  7/24/2017 Yes        Infection of vascular bypass graft [facility]) ICD-10-CM: T82. 7XXA  ICD-9-CM: 996.62  7/24/2017 Yes    Overview Signed 8/7/2017  2:19 PM by Ramón Rivas MD     Left lower extremity             Anticoagulated on Coumadin (Chronic) ICD-10-CM: Z51.81, Z79.01  ICD-9-CM: V58.83, V58.61  Unknown Yes        Paroxysmal atrial fibrillation (HCC) ICD-10-CM: I48.0  ICD-9-CM: 427.31  Unknown Yes        Peripheral artery disease ([facility] [address] (Chronic) ICD-10-CM: I73.9  ICD-9-CM: 443.9  1/25/2017 Yes        Benign hypertensive heart disease with systolic congestive heart failure, NYHA class 2 (HCC) (Chronic) ICD-10-CM: I11.0, I50.20  ICD-9-CM: 402.11, 428.20, 428.0  1/26/2015 Yes              Background:   Past Medical History:   Past Medical History:   Diagnosis Date    Acute blood loss as cause of postoperative anemia 4/4/2017    Anticoagulated on Coumadin     Aortoiliac occlusive disease ([address] 1/25/2017    Atherosclerosis of native artery of both lower extremities with intermittent claudication ([address] 1/25/2017    Benign hypertensive heart disease with systolic congestive heart failure, NYHA class 2 ([address] 1/26/2015    Carotid artery disease (HCC)     Chronic anemia     Chronic systolic heart failure (HCC)     Chronic ulcer of right foot ([address] 4/4/2017    Chronic ulcer of right heel ([address] 8/8/2017    Coronary artery disease involving native coronary artery of native heart 3/15/2017    Successful stenting of Cx (Xience LESLEY) and RCA (Xience LESLEY) to 0% by Dr. Sudhakar Titus on 3/15/17.     DDD (degenerative disc disease), lumbar 1/26/2015    Dyslipidemia     Erectile dysfunction 7/5/2016    Euthyroid sick syndrome 4/6/2017    Hereditary peripheral neuropathy 11/15/2016    History of cardioversion 4/18/2017    S/P Synchronized external cardioversion (4/18/2017 - Dr. Madelyn Long)    History of vitamin D deficiency 4/22/2017    Vitamin D 25-Hydroxy (4/22/2017) = 12.0; Vitamin D 25-Hydroxy (5/26/2017) = 38.7    Infection of vascular bypass graft ([facility] 7/24/2017    Left lower extremity    Ischemic cardiomyopathy     Moderate to severe pulmonary hypertension ([facility] 7/23/2017    Paroxysmal atrial fibrillation (HCC)     Peripheral artery disease ([facility] 1/25/2017    Skin ulcer of malleolar area of right ankle ([facility]     Spinal stenosis of lumbar region with radiculopathy 5/4/2015    Dr. Paul Cárdenas      Patient taking anticoagulants yes   Patient has a defibrillator: no     Assessment:   Changes in Assessment throughout shift: no     Patient has central line:yes Reasons if yes: long term antibiotic  Insertion date:08/24/17 Last dressing date:08/24/17   Patient has Jiang Cath: no Reasons if yes:     Insertion date:     Last Vitals:     Vitals:    08/29/17 0712 08/29/17 1507 08/29/17 2000 08/30/17 0517   BP: 152/69 148/68 154/73 142/70   Pulse: 62 67 64 62   Resp: 18 18 16    Temp: 97.7 °F (36.5 °C) 97.8 °F (36.6 °C) 98 °F (36.7 °C)    SpO2: 100% 91% 100%    Weight:       Height:            PAIN    Pain Assessment    Pain Intensity 1: 0 (08/30/17 0600) Pain Intensity 1: 2 (12/29/14 1105)    Pain Location 1: Leg Pain Location 1: Abdomen    Pain Intervention(s) 1: Medication (see MAR) Pain Intervention(s) 1: Medication (see MAR)  Patient Stated Pain Goal: 0 Patient Stated Pain Goal: 0  o Intervention effective: no    o Other actions taken for pain: pain medicine     Skin Assessment  Skin color Skin Color: Appropriate for ethnicity  Condition/Temperature Skin Condition/Temp: Cool  Integrity Skin Integrity: Incision (comment)  Turgor Turgor: Non-tenting  Weekly Pressure Ulcer Documentation  Pressure  Injury Documentation: No Pressure Injury Noted-Pressure Ulcer Prevention Initiated  Wound Prevention & Protection Methods  Orientation of wound Orientation of Wound Prevention: Posterior  Location of Prevention Location of Wound Prevention: Sacrum/Coccyx  Dressing Present Dressing Present : No  Dressing Status Dressing Status: Intact  Wound Offloading Wound Offloading (Prevention Methods): Bed, pressure redistribution/air, Chair cushion, Wheelchair     INTAKE/OUPUT    Date 08/29/17 0700 - 08/30/17 0659 08/30/17 0700 - 08/31/17 0659   Shift 1674-2296 5433-2979 24 Hour Total 1439-1240 4035-9127 24 Hour Total   I  N  T  A  K  E   P.O. 660  660         P. O. 660  660       Shift Total  (mL/kg) 660  (9.3)  660  (9.3)      O  U  T  P  U  T   Urine  (mL/kg/hr) 750  (0.9) 2350  (2.8) 3100  (1.8)         Urine Voided 750 2350 3100         Urine Occurrence(s) 3 x 5 x 8 x       Stool            Stool Occurrence(s) 1 x 0 x 1 x       Shift Total  (mL/kg) 750  (10.6) 2350  (33.2) 3100  (43.8)      NET -90 -2350 -2440      Weight (kg) 70.8 70.8 70.8 70.8 70.8 70.8       Recommendations:  1. Patient needs and requests: pain medicine    2. Diet: cardiac    3. Pending tests/procedures: no     4. Functional Level/Equipment: 1 x person assist    5. Estimated Discharge Date: TDB Posted on Whiteboard in Patients Room: [facility] Safety Check    A safety check occurred in the patient's room between off going nurse and oncoming nurse listed above.     The safety check included the below items  Area Items   H  High Alert Medications - Verify all high alert medication drips (heparin, PCA, etc.)   E  Equipment - Suction is set up for ALL patients (with yanker)  - Red plugs utilized for all equipment (IV pumps, etc.)  - WOWs wiped down at end of shift.  - Room stocked with oxygen, suction, and other unit-specific supplies   A  Alarms - Bed alarm is set for fall risk patients  - Ensure chair alarm is in place and activated if patient is up in a chair   L  Lines - Check IV for any infiltration  - Jiang bag is empty if patient has a Jiang   - Tubing and IV bags are labeled   S  Safety   - Room is clean, patient is clean, and equipment is clean.  - Hallways are clear from equipment besides carts. - Fall bracelet on for fall risk patients  - Ensure room is clear and free of clutter  - Suction is set up for ALL patients (with cailin)  - Hallways are clear from equipment besides carts.    - Isolation precautions followed, supplies available outside room, sign posted

## 2017-08-31 LAB
HCT VFR BLD AUTO: 26.4 % (ref 36–48)
HGB BLD-MCNC: 8.4 G/DL (ref 13–16)
INR PPP: 2.2 (ref 0.8–1.2)
PLATELET # BLD AUTO: 790 K/UL (ref 135–420)
PROTHROMBIN TIME: 23.6 SEC (ref 11.5–15.2)

## 2017-08-31 PROCEDURE — 74011250636 HC RX REV CODE- 250/636: Performed by: INTERNAL MEDICINE

## 2017-08-31 PROCEDURE — 97112 NEUROMUSCULAR REEDUCATION: CPT

## 2017-08-31 PROCEDURE — 97530 THERAPEUTIC ACTIVITIES: CPT

## 2017-08-31 PROCEDURE — 85049 AUTOMATED PLATELET COUNT: CPT | Performed by: INTERNAL MEDICINE

## 2017-08-31 PROCEDURE — 85018 HEMOGLOBIN: CPT | Performed by: INTERNAL MEDICINE

## 2017-08-31 PROCEDURE — 74011250637 HC RX REV CODE- 250/637: Performed by: INTERNAL MEDICINE

## 2017-08-31 PROCEDURE — 85610 PROTHROMBIN TIME: CPT | Performed by: INTERNAL MEDICINE

## 2017-08-31 PROCEDURE — 74011000258 HC RX REV CODE- 258: Performed by: INTERNAL MEDICINE

## 2017-08-31 PROCEDURE — 36592 COLLECT BLOOD FROM PICC: CPT

## 2017-08-31 PROCEDURE — 97110 THERAPEUTIC EXERCISES: CPT

## 2017-08-31 PROCEDURE — 97116 GAIT TRAINING THERAPY: CPT

## 2017-08-31 PROCEDURE — 65310000000 HC RM PRIVATE REHAB

## 2017-08-31 RX ORDER — WARFARIN SODIUM 5 MG/1
5 TABLET ORAL ONCE
Status: COMPLETED | OUTPATIENT
Start: 2017-08-31 | End: 2017-08-31

## 2017-08-31 RX ADMIN — Medication 250 MG: at 08:17

## 2017-08-31 RX ADMIN — POTASSIUM CHLORIDE 20 MEQ: 1.5 POWDER, FOR SOLUTION ORAL at 08:18

## 2017-08-31 RX ADMIN — ATORVASTATIN CALCIUM 40 MG: 40 TABLET, FILM COATED ORAL at 21:06

## 2017-08-31 RX ADMIN — HYDROMORPHONE HYDROCHLORIDE 4 MG: 2 TABLET ORAL at 02:03

## 2017-08-31 RX ADMIN — GABAPENTIN 300 MG: 300 CAPSULE ORAL at 13:06

## 2017-08-31 RX ADMIN — ZINC SULFATE 220 MG (50 MG) CAPSULE 220 MG: CAPSULE at 08:18

## 2017-08-31 RX ADMIN — POLYETHYLENE GLYCOL 3350 17 G: 17 POWDER, FOR SOLUTION ORAL at 18:27

## 2017-08-31 RX ADMIN — GABAPENTIN 300 MG: 300 CAPSULE ORAL at 05:24

## 2017-08-31 RX ADMIN — AMPICILLIN AND SULBACTAM 3 G: 1; 2 INJECTION, POWDER, FOR SOLUTION INTRAMUSCULAR; INTRAVENOUS at 14:04

## 2017-08-31 RX ADMIN — Medication 400 MG: at 08:17

## 2017-08-31 RX ADMIN — AMPICILLIN AND SULBACTAM 3 G: 1; 2 INJECTION, POWDER, FOR SOLUTION INTRAMUSCULAR; INTRAVENOUS at 21:05

## 2017-08-31 RX ADMIN — LOSARTAN POTASSIUM 100 MG: 50 TABLET ORAL at 05:24

## 2017-08-31 RX ADMIN — HYDROMORPHONE HYDROCHLORIDE 4 MG: 2 TABLET ORAL at 18:29

## 2017-08-31 RX ADMIN — AMPICILLIN AND SULBACTAM 3 G: 1; 2 INJECTION, POWDER, FOR SOLUTION INTRAMUSCULAR; INTRAVENOUS at 08:15

## 2017-08-31 RX ADMIN — CYANOCOBALAMIN TAB 1000 MCG 1000 MCG: 1000 TAB at 08:18

## 2017-08-31 RX ADMIN — Medication 1 CAPSULE: at 08:17

## 2017-08-31 RX ADMIN — Medication 325 MG: at 08:18

## 2017-08-31 RX ADMIN — HYDROMORPHONE HYDROCHLORIDE 4 MG: 2 TABLET ORAL at 21:50

## 2017-08-31 RX ADMIN — GABAPENTIN 300 MG: 300 CAPSULE ORAL at 21:06

## 2017-08-31 RX ADMIN — HYDROMORPHONE HYDROCHLORIDE 4 MG: 2 TABLET ORAL at 05:24

## 2017-08-31 RX ADMIN — ASPIRIN 81 MG 81 MG: 81 TABLET ORAL at 08:17

## 2017-08-31 RX ADMIN — AMPICILLIN AND SULBACTAM 3 G: 1; 2 INJECTION, POWDER, FOR SOLUTION INTRAMUSCULAR; INTRAVENOUS at 03:00

## 2017-08-31 RX ADMIN — FUROSEMIDE 20 MG: 20 TABLET ORAL at 08:17

## 2017-08-31 RX ADMIN — METOPROLOL TARTRATE 25 MG: 25 TABLET, FILM COATED ORAL at 08:18

## 2017-08-31 RX ADMIN — METOPROLOL TARTRATE 25 MG: 25 TABLET, FILM COATED ORAL at 21:06

## 2017-08-31 RX ADMIN — WARFARIN SODIUM 5 MG: 5 TABLET ORAL at 18:57

## 2017-08-31 RX ADMIN — AMIODARONE HYDROCHLORIDE 200 MG: 200 TABLET ORAL at 08:18

## 2017-08-31 RX ADMIN — VITAMIN D, TAB 1000IU (100/BT) 2000 UNITS: 25 TAB at 08:17

## 2017-08-31 RX ADMIN — DOCUSATE SODIUM 100 MG: 100 CAPSULE, LIQUID FILLED ORAL at 08:17

## 2017-08-31 RX ADMIN — DOCUSATE SODIUM 100 MG: 100 CAPSULE, LIQUID FILLED ORAL at 17:21

## 2017-08-31 NOTE — ROUTINE PROCESS
SHIFT CHANGE NOTE FOR [facility]    Bedside and Verbal shift change report given to Krys Marie, RN (oncoming nurse) by Gabi Dill RN   (offgoing nurse). Report included the following information SBAR, Kardex, MAR and Recent Results. Situation:   Code Status: Full Code   Reason for Admission: [address] Day: 6   Problem List:   Hospital Problems  Date Reviewed: 8/31/2017          Codes Class Noted POA    Skin ulcer of malleolar area of right ankle ([facility] [address] (Chronic) ICD-10-CM: L97.319  ICD-9-CM: 707.13  Unknown Yes        * (Principal)Status post above knee amputation of left lower extremity ([facility] [address] ICD-10-CM: L51.313  ICD-9-CM: V49.76  8/18/2017 Yes    Overview Signed 8/21/2017 10:21 PM by Suraj Garcia MD     S/P Left above-the-knee amputation (8/18/2017 - Dr. Marcia Portillo)               Aftercare for amputation stump ICD-10-CM: Z47.81  ICD-9-CM: V54.89  8/18/2017 Yes    Overview Signed 8/21/2017 10:25 PM by Surja Garcia MD     S/P Left above-the-knee amputation (8/18/2017 - Dr. Marcia Portillo)             Ischemic rest pain of lower extremity ([facility] [address] ICD-10-CM: I73.9  ICD-9-CM: 443.9  8/14/2017 Yes    Overview Signed 8/15/2017 11:09 AM by Suraj Garcia MD     Left             Ch[facility] right [facility] (Chronic) ICD-10-CM: L97.419  ICD-9-CM: 707.14  8/8/2017 Yes        Acute blood loss as cause of postoperative anemia ICD-10-CM: D62  ICD-9-CM: 285.1  7/25/2017 Yes        Aftercare following surgery of the circulatory system ICD-10-CM: Z48.812  ICD-9-CM: V58.73  7/25/2017 Yes    Overview Addendum 8/21/2017 10:22 PM by Suraj Garcia MD     S/P Removal of infected graft; Repair of left superficial femoral artery; Repair of left common femoral artery (7/25/2017 - Dr. Marcia Portillo); S/P Right axillary femoral jump bypass interposition; Removal of infected right axillary femoral bypass;  Wound VAC placement of upper thigh 1.2 cm x 5.2 cm x 1.8 cm and groin 4 cm x 0.5 cm x 0.2 cm (8/18/2017 - Dr. Ar Bello)             Impaired mobility and ADLs ICD-10-CM: Z74.09  ICD-9-CM: 799.89  7/24/2017 Yes        Infection of vascular bypass graft [facility]) ICD-10-CM: T82. 7XXA  ICD-9-CM: 996.62  7/24/2017 Yes    Overview Signed 8/7/2017  2:19 PM by Cherelle Dukes MD     Left lower extremity             Anticoagulated on Coumadin (Chronic) ICD-10-CM: Z51.81, Z79.01  ICD-9-CM: V58.83, V58.61  Unknown Yes        Paroxysmal atrial fibrillation (HCC) ICD-10-CM: I48.0  ICD-9-CM: 427.31  Unknown Yes        Peripheral artery disease ([facility] [address] (Chronic) ICD-10-CM: I73.9  ICD-9-CM: 443.9  1/25/2017 Yes        Benign hypertensive heart disease with systolic congestive heart failure, NYHA class 2 (HCC) (Chronic) ICD-10-CM: I11.0, I50.20  ICD-9-CM: 402.11, 428.20, 428.0  1/26/2015 Yes              Background:   Past Medical History:   Past Medical History:   Diagnosis Date    Acute blood loss as cause of postoperative anemia 4/4/2017    Anticoagulated on Coumadin     Aortoiliac occlusive disease ([address] 1/25/2017    Atherosclerosis of native artery of both lower extremities with intermittent claudication ([facility] [address] 1/25/2017    Benign hypertensive heart disease with systolic congestive heart failure, NYHA class 2 ([address] 1/26/2015    Carotid artery disease (HCC)     Chronic anemia     Chronic systolic heart failure (HCC)     Chronic ulcer of right foot ([address] 4/4/2017    Chronic ulcer of right heel ([facility] [address] 8/8/2017    Coronary artery disease involving native coronary artery of native heart 3/15/2017    Successful stenting of Cx (Xience LESLEY) and RCA (Xience LESLEY) to 0% by Dr. Loren Ryan on 3/15/17.     DDD (degenerative disc disease), lumbar 1/26/2015    Dyslipidemia     Erectile dysfunction 7/5/2016    Euthyroid sick syndrome 4/6/2017    Hereditary peripheral neuropathy 11/15/2016    History of cardioversion 4/18/2017    S/P Synchronized external cardioversion (4/18/2017 - Dr. Yasmine Montejo)    History of vitamin D deficiency 4/22/2017    Vitamin D 25-Hydroxy (4/22/2017) = 12.0; Vitamin D 25-Hydroxy (5/26/2017) = 38.7    Infection of vascular bypass graft ([facility] 7/24/2017    Left lower extremity    Ischemic cardiomyopathy     Moderate to severe pulmonary hypertension ([facility] 7/23/2017    Paroxysmal atrial fibrillation (HCC)     Peripheral artery disease ([facility] 1/25/2017    Skin ulcer of malleolar area of right ankle ([facility]     Spinal stenosis of lumbar region with radiculopathy 5/4/2015    Dr. Sandi Hull      Patient taking anticoagulants yes   Patient has a defibrillator: no     Assessment:   Changes in Assessment throughout shift: no     Patient has central line:yes Reasons if yes: long term antibiotic  Insertion date: 08/24/17 Last dressing date:08/31/17   Patient has Jiang Cath: no Reasons if yes:     Insertion date:     Last Vitals:     Vitals:    08/30/17 2042 08/31/17 0524 08/31/17 0811 08/31/17 1600   BP: 122/66 122/70 152/72 127/68   Pulse: 60 62 (!) 57 62   Resp:   18 18   Temp:   96.7 °F (35.9 °C) 97.6 °F (36.4 °C)   SpO2:   100% 100%   Weight:       Height:            PAIN    Pain Assessment    Pain Intensity 1: 9 (08/31/17 1829) Pain Intensity 1: 2 (12/29/14 1105)    Pain Location 1: Leg Pain Location 1: Abdomen    Pain Intervention(s) 1: Medication (see MAR) Pain Intervention(s) 1: Medication (see MAR)  Patient Stated Pain Goal: 0 Patient Stated Pain Goal: 0  o Intervention effective: no    o Other actions taken for pain: pain medicine     Skin Assessment  Skin color Skin Color: Appropriate for ethnicity  Condition/Temperature Skin Condition/Temp: Warm  Integrity Skin Integrity: Incision (comment)  Turgor Turgor: Non-tenting  Weekly Pressure Ulcer Documentation  Pressure  Injury Documentation: No Pressure Injury Noted-Pressure Ulcer Prevention Initiated  Wound Prevention & Protection Methods  Orientation of wound Orientation of Wound Prevention: Posterior  Location of Prevention Location of Wound Prevention: Sacrum/Coccyx  Dressing Present Dressing Present : No  Dressing Status Dressing Status: Intact  Wound Offloading Wound Offloading (Prevention Methods): Bed, pressure redistribution/air, Chair cushion, Wheelchair     INTAKE/OUPUT    Date 08/30/17 0700 - 08/31/17 0659 08/31/17 0700 - 09/01/17 0659   Shift 8378-3774 7859-3479 24 Hour Total 3955-1875 1915-8920 24 Hour Total   I  N  T  A  K  E   P. O. 720  720 360  360      P. O. 720  720 360  360    Shift Total  (mL/kg) 720  (10.2)  720  (10.2) 360  (5.1)  360  (5.1)   O  U  T  P  U  T   Urine  (mL/kg/hr) 1400  (1.6) 2150  (2.5) 3550  (2.1) 1800  1800      Urine Voided 1400 2150 3550 1800  1800      Urine Occurrence(s) 3 x  3 x 3 x  3 x    Stool  1 1         Stool Occurrence(s) 1 x  1 x 0 x  0 x      Stool  1 1       Shift Total  (mL/kg) 1400  (19.8) 2151  (30.4) 3551  (50.2) 1800  (25.4)  1800  (25.4)   NET -440 -7058 -5514 -9304 -1446   Weight (kg) 70.8 70.8 70.8 70.8 70.8 70.8       Recommendations:  1. Patient needs and requests: pain medicine    2. Diet: cardiac    3. Pending tests/procedures: no     4. Functional Level/Equipment: 1 x person assist    5. Estimated Discharge Date: TDB Posted on Whiteboard in Patients Room: no     [facility] [place] Safety Check    A safety check occurred in the patient's room between off going nurse and oncoming nurse listed above.     The safety check included the below items  Area Items   H  High Alert Medications - Verify all high alert medication drips (heparin, PCA, etc.)   E  Equipment - Suction is set up for ALL patients (with yanker)  - Red plugs utilized for all equipment (IV pumps, etc.)  - WOWs wiped down at end of shift.  - Room stocked with oxygen, suction, and other unit-specific supplies   A  Alarms - Bed alarm is set for fall risk patients  - Ensure chair alarm is in place and activated if patient is up in a chair   L  Lines - Check IV for any infiltration  - Jiang bag is empty if patient has a Jiang   - Tubing and IV bags are labeled   S  Safety   - Room is clean, patient is clean, and equipment is clean. - Hallways are clear from equipment besides carts. - Fall bracelet on for fall risk patients  - Ensure room is clear and free of clutter  - Suction is set up for ALL patients (with cailin)  - Hallways are clear from equipment besides carts.    - Isolation precautions followed, supplies available outside room, sign posted

## 2017-08-31 NOTE — PROGRESS NOTES
Problem: Mobility Impaired (Adult and Pediatric)  Goal: *Acute Goals and Plan of Care (Insert Text)  Physical Therapy Short Term Goals  Initiated 8/26/2017 and to be accomplished within 7 day(s) 09/02/2017  1. Patient will move from supine to sit and sit to supine , scoot up and down and roll side to side in bed with independence. 2. Patient will transfer from bed to chair and chair to bed with supervision/set-up using the least restrictive device. 3. Patient will perform sit to stand with supervision/set-up. 4. Patient will ambulate with supervision/set-up for 50 feet with the least restrictive device. 5. Patient will ascend/descend 3 stairs with 2 handrail(s) with minimal assistance/contact guard assist.    Physical Therapy Long Term Goals  Initiated 8/26/2017 and to be accomplished within 14 day(s) 09/09/2017  1. Patient will move from supine to sit and sit to supine , scoot up and down and roll side to side in bed with independence. 2. Patient will transfer from bed to chair and chair to bed with modified independence using the least restrictive device. 3. Patient will perform sit to stand with modified independence. 4. Patient will ambulate with modified independence for 50 feet with the least restrictive device. 5. Patient will ascend/descend 3 stairs with 2 handrail(s) with supervision/set-up. Outcome: Progressing Towards Goal  PHYSICAL THERAPY DAILY NOTE  Patient Name:Mj Guillen  Time In: 0930  Time Out: 1100  Patient Seen For: AM;Gait training;Patient education; Therapeutic exercise;Transfer training  Diagnosis: Left AKA  Status post above knee amputation of left lower extremity ([facility] Status post above knee amputation of left lower extremity ([facility])  Precautions: falls, safety. Slight impulsiveness     Subjective: I think I am feeling stronger and I am willing to do whatever I have to so that I can go home.      Pain at start of tx:4/10  Pain at stop of tx:4/10     Patient identified with name and :yes, both name and          Objective: Pt is seen for skilled PT session for deficits after a AKA. He is challenged with safety, transfers, mobility, ambulation and strength training. GROSS ASSESSMENT Daily Assessment     AROM: Within functional limits  PROM: Within functional limits  Strength: Within functional limits  Coordination: Within functional limits  Tone: Normal  Sensation: Intact          BED/MAT MOBILITY Daily Assessment     Rolling Right : 7 (Independent)  Rolling Left : 7 (Independent)  Supine to Sit : 7 (Independent)  Sit to Supine : 7 (Independent)          TRANSFERS Daily Assessment     Transfer Type: Other  Other:  (stand hop step)  Transfer Assistance : 6 (Modified independent)  Sit to Stand Assistance: Supervision  Car Transfers: Not tested  Car Type:  (NA)          GAIT Daily Assessment     Amount of Assistance: 5 (Supervision/setup)  Distance (ft):  (125 ft x 1, 72 ft x 2)  Assistive Device: Walker, rolling Pt with VCs required for safety as he was able to increase his distance with ambulation, however he has had difficulty today with overshooting RLE when not focused on task at hand. Pt is slightly impulsive and requires VCs to correct for safety awareness. STEPS or STAIRS Daily Assessment      Pt has been challenged with 4 inch step with hop ups/downs x 3. He requires min/modA. He reports pain and numbness of R foot and great toe. BALANCE Daily Assessment     Sitting - Static: Good (unsupported)  Sitting - Dynamic: Good (unsupported)  Standing - Static: Fair  Standing - Dynamic : Impaired Pt with slight LOB with ambulation of 125 ft, however he was able to self correct RW without difficulty. He required standing rest break after this 2/2 fatigue.           LOWER EXTREMITY EXERCISES Daily Assessment     Extremity: Both (hip flexion, abduction, extension)  Exercise Type #1: Supine lower extremity strengthening  Sets Performed: 3  Reps Performed: 15  Level of Assist: Supervision  Exercise Type #2: Seated lower extremity strengthening  Sets Performed: 2  Reps Performed: 15  Level of Assist: Supervision Pt is challenged with a 3# weight for R SLR, heel slides. He reports discomfort of R heel with return to mat, this was corrected by placing towel roll under R ankle and he performed knee flexion in supine without heel hitting mat. Assessment: Pt with good progress toward STGs. He has made gains with strength of RLE and with mobility training. Plan of Care:  Continue with POC and address stair negotiation as tolerated as pt has c/o numbness and pain with stair hopping of his R foot, heel and great toe.      Jayshree Robles  8/31/2017

## 2017-08-31 NOTE — ROUTINE PROCESS
SHIFT CHANGE NOTE FOR [facility]    Bedside and Verbal shift change report given to Rachell Finn RN (oncoming nurse) by Claudean Hull, RN   (offgoing nurse). Report included the following information SBAR, Kardex, MAR and Recent Results. Situation:   Code Status: Full Code   Reason for Admission: [address] Day: 6   Problem List:   Hospital Problems  Date Reviewed: 8/30/2017          Codes Class Noted POA    Skin ulcer of malleolar area of right ankle ([facility] (Chronic) ICD-10-CM: L97.319  ICD-9-CM: 707.13  Unknown Yes        * (Principal)Status post above knee amputation of left lower extremity ([facility] ICD-10-CM: B50.392  ICD-9-CM: V49.76  8/18/2017 Yes    Overview Signed 8/21/2017 10:21 PM by Dina Centeno MD     S/P Left above-the-knee amputation (8/18/2017 - Dr. Estelle Brannon)               Aftercare for amputation stump ICD-10-CM: Z47.81  ICD-9-CM: V54.89  8/18/2017 Yes    Overview Signed 8/21/2017 10:25 PM by Dina Centeno MD     S/P Left above-the-knee amputation (8/18/2017 - Dr. Estelle Brannon)             Ischemic rest pain of lower extremity ([facility] ICD-10-CM: I73.9  ICD-9-CM: 443.9  8/14/2017 Yes    Overview Signed 8/15/2017 11:09 AM by Dina Centeno MD     Left             Chronic ulcer of right heel ([facility] (Chronic) ICD-10-CM: L97.419  ICD-9-CM: 707.14  8/8/2017 Yes        Acute blood loss as cause of postoperative anemia ICD-10-CM: D62  ICD-9-CM: 285.1  7/25/2017 Yes        Aftercare following surgery of the circulatory system ICD-10-CM: Z48.812  ICD-9-CM: V58.73  7/25/2017 Yes    Overview Addendum 8/21/2017 10:22 PM by Dina Centeno MD     S/P Removal of infected graft; Repair of left superficial femoral artery; Repair of left common femoral artery (7/25/2017 - Dr. Estelle Brannon); S/P Right axillary femoral jump bypass interposition; Removal of infected right axillary femoral bypass;  Wound VAC placement of upper thigh 1.2 cm x 5.2 cm x 1.8 cm and groin 4 cm x 0.5 cm x 0.2 cm (8/18/2017 - Dr. Clarence Man)             Impaired mobility and ADLs ICD-10-CM: Z74.09  ICD-9-CM: 799.89  7/24/2017 Yes        Infection of vascular bypass graft [facility]) ICD-10-CM: T82. 7XXA  ICD-9-CM: 996.62  7/24/2017 Yes    Overview Signed 8/7/2017  2:19 PM by Tyrone Cool MD     Left lower extremity             Anticoagulated on Coumadin (Chronic) ICD-10-CM: Z51.81, Z79.01  ICD-9-CM: V58.83, V58.61  Unknown Yes        Paroxysmal atrial fibrillation (HCC) ICD-10-CM: I48.0  ICD-9-CM: 427.31  Unknown Yes        Peripheral artery disease ([facility] (Chronic) ICD-10-CM: I73.9  ICD-9-CM: 443.9  1/25/2017 Yes        Benign hypertensive heart disease with systolic congestive heart failure, NYHA class 2 (HCC) (Chronic) ICD-10-CM: I11.0, I50.20  ICD-9-CM: 402.11, 428.20, 428.0  1/26/2015 Yes              Background:   Past Medical History:   Past Medical History:   Diagnosis Date    Acute blood loss as cause of postoperative anemia 4/4/2017    Anticoagulated on Coumadin     Aortoiliac occlusive disease ([address] 1/25/2017    Atherosclerosis of native artery of both lower extremities with intermittent claudication ([address] 1/25/2017    Benign hypertensive heart disease with systolic congestive heart failure, NYHA class 2 ([address] 1/26/2015    Carotid artery disease (HCC)     Chronic anemia     Chronic systolic heart failure (HCC)     Chronic ulcer of right foot ([address] 4/4/2017    Chronic ulcer of right heel ([address] 8/8/2017    Coronary artery disease involving native coronary artery of native heart 3/15/2017    Successful stenting of Cx (Xience LESLEY) and RCA (Xience LESLEY) to 0% by Dr. Champ Herbert on 3/15/17.     DDD (degenerative disc disease), lumbar 1/26/2015    Dyslipidemia     Erectile dysfunction 7/5/2016    Euthyroid sick syndrome 4/6/2017    Hereditary peripheral neuropathy 11/15/2016    History of cardioversion 4/18/2017    S/P Synchronized external cardioversion (4/18/2017 - Dr. Adonis Leggett)    History of vitamin D deficiency 4/22/2017    Vitamin D 25-Hydroxy (4/22/2017) = 12.0; Vitamin D 25-Hydroxy (5/26/2017) = 38.7    Infection of vascular bypass graft ([facility] 7/24/2017    Left lower extremity    Ischemic cardiomyopathy     Moderate to severe pulmonary hypertension ([facility] 7/23/2017    Paroxysmal atrial fibrillation (HCC)     Peripheral artery disease ([facility] 1/25/2017    Skin ulcer of malleolar area of right ankle ([facility]     Spinal stenosis of lumbar region with radiculopathy 5/4/2015    Dr. Luke Benton      Patient taking anticoagulants yes   Patient has a defibrillator: no     Assessment:   Changes in Assessment throughout shift: no     Patient has central line:yes Reasons if yes: long term antibiotic  Insertion date:08/24/17 Last dressing date:08/24/17   Patient has Jiang Cath: no Reasons if yes:     Insertion date:     Last Vitals:     Vitals:    08/30/17 1610 08/30/17 2000 08/30/17 2042 08/31/17 0524   BP: 120/63 151/73 122/66 122/70   Pulse: 62 68 60 62   Resp: 18 18     Temp: 97.3 °F (36.3 °C) 98.5 °F (36.9 °C)     SpO2: 100% 100%     Weight:       Height:            PAIN    Pain Assessment    Pain Intensity 1: 0 (08/31/17 0555) Pain Intensity 1: 2 (12/29/14 1105)    Pain Location 1: Leg Pain Location 1: Abdomen    Pain Intervention(s) 1: Medication (see MAR) Pain Intervention(s) 1: Medication (see MAR)  Patient Stated Pain Goal: 0 Patient Stated Pain Goal: 0  o Intervention effective: no    o Other actions taken for pain: pain medicine     Skin Assessment  Skin color Skin Color: Appropriate for ethnicity  Condition/Temperature Skin Condition/Temp: Warm  Integrity Skin Integrity: Incision (comment), Wound (add Wound LDA)  Turgor Turgor: Non-tenting  Weekly Pressure Ulcer Documentation  Pressure  Injury Documentation: No Pressure Injury Noted-Pressure Ulcer Prevention Initiated  Wound Prevention & Protection Methods  Orientation of wound Orientation of Wound Prevention: Posterior  Location of Prevention Location of Wound Prevention: Sacrum/Coccyx  Dressing Present Dressing Present : No  Dressing Status Dressing Status: Intact  Wound Offloading Wound Offloading (Prevention Methods): Bed, pressure redistribution/air, Chair cushion, Wheelchair     INTAKE/OUPUT    Date 08/30/17 0700 - 08/31/17 0659 08/31/17 0700 - 09/01/17 0659   Shift 0700-1859 2939-4714 24 Hour Total 0700-1859 1900-0659 24 Hour Total   I  N  T  A  K  E   P. O. 720  720         P. O. 720  720       Shift Total  (mL/kg) 720  (10.2)  720  (10.2)      O  U  T  P  U  T   Urine  (mL/kg/hr) 1400  (1.6) 2150  (2.5) 3550  (2.1)         Urine Voided 1400 2150 3550         Urine Occurrence(s) 3 x  3 x       Stool  1 1         Stool Occurrence(s) 1 x  1 x         Stool  1 1       Shift Total  (mL/kg) 1400  (19.8) 2151  (30.4) 3551  (50.2)      NET -517 [phone] -[phone]      Weight (kg) 70.8 70.8 70.8 70.8 70.8 70.8       Recommendations:  1. Patient needs and requests: pain medicine    2. Diet: cardiac    3. Pending tests/procedures: no     4. Functional Level/Equipment: 1 x person assist    5. Estimated Discharge Date: TDB Posted on Whiteboard in Patients Room: [facility] Safety Check    A safety check occurred in the patient's room between off going nurse and oncoming nurse listed above.     The safety check included the below items  Area Items   H  High Alert Medications - Verify all high alert medication drips (heparin, PCA, etc.)   E  Equipment - Suction is set up for ALL patients (with yanker)  - Red plugs utilized for all equipment (IV pumps, etc.)  - WOWs wiped down at end of shift.  - Room stocked with oxygen, suction, and other unit-specific supplies   A  Alarms - Bed alarm is set for fall risk patients  - Ensure chair alarm is in place and activated if patient is up in a chair   L  Lines - Check IV for any infiltration  - Jiang bag is empty if patient has a Jiang   - Tubing and IV bags are labeled   S  Safety   - Room is clean, patient is clean, and equipment is clean. - Hallways are clear from equipment besides carts. - Fall bracelet on for fall risk patients  - Ensure room is clear and free of clutter  - Suction is set up for ALL patients (with cailin)  - Hallways are clear from equipment besides carts.    - Isolation precautions followed, supplies available outside room, sign posted

## 2017-08-31 NOTE — PROGRESS NOTES
[address]  [address] [place] [address], [address]     INPATIENT REHABILITATION  DAILY PROGRESS NOTE     Date: 8/31/2017    Name: Susana Webster Age / Sex: 59 y.o. / male   CSN: [id number] MRN: [id number]   [facility] Date: 8/25/2017 Length of Stay: 6 days     Primary Rehab Diagnosis: Impaired Mobility and ADLs secondary to:  1. S/P Left above-the-knee amputation (8/18/2017 - Dr. Marcia Portillo)  3. History of ischemic rest pain of the left lower extremity due to severe peripheral vascular disease       Subjective:     Patient seen and examined. Blood pressure controlled. Patient's Complaint:   No significant medical complaints    Pain Control: stable, mild-to-moderate joint symptoms intermittently, reasonably well controlled by current meds      Objective:     Vital Signs:  Patient Vitals for the past 24 hrs:   BP Temp Pulse Resp SpO2   08/31/17 0811 152/72 96.7 °F (35.9 °C) (!) 57 18 100 %   08/31/17 0524 122/70 - 62 - -   08/30/17 2042 122/66 - 60 - -   08/30/17 2000 151/73 98.5 °F (36.9 °C) 68 18 100 %   08/30/17 1610 120/63 97.3 °F (36.3 °C) 62 18 100 %        Physical Examination:  GENERAL SURVEY: Patient is awake, alert, oriented x 3, sitting comfortably on the chair, not in acute respiratory distress.   HEENT: pale palpebral conjunctivae, anicteric sclerae, no nasoaural discharge, moist oral mucosa  NECK: supple, no jugular venous distention, no palpable lymph nodes  CHEST/LUNGS: symmetrical chest expansion, good air entry, clear breath sounds  HEART: adynamic precordium, good S1 S2, no S3, regular rhythm, no murmurs  ABDOMEN: flat, bowel sounds appreciated, soft, non-tender  EXTREMITIES: (+) left AKA stump with dressing, (+) WoundVac on left medial thigh, (+) ~1 cm x ~1 cm ulcer on right heel, (+) 1.5 cm x 1.5 cm wound above lateral malleolus of right ankle, pale nailbeds, no edema, full and equal pulses, no calf tenderness   NEUROLOGICAL EXAM: The patient is awake, alert and oriented x3, able to answer questions fairly appropriately, able to follow 1 and 2 step commands. Able to tell time from the wall clock. Cranial nerves II-XII are grossly intact. No gross sensory deficit.   Motor strength is 4+/5 on BUE, 4+/5 on the RLE, 3+/5 on the left hip, 4-/5 on the left knee and left ankle.     Incision(s): healing well, clean, dry, and intact       Current Medications:  Current Facility-Administered Medications   Medication Dose Route Frequency    alteplase (CATHFLO) 1 mg in sterile water (preservative free) 1 mL injection  1 mg InterCATHeter PRN    losartan (COZAAR) tablet 100 mg  100 mg Oral DAILY    acetaminophen (TYLENOL) tablet 650 mg  650 mg Oral Q4H PRN    docusate sodium (COLACE) capsule 100 mg  100 mg Oral BID    bisacodyl (DULCOLAX) tablet 10 mg  10 mg Oral Q48H PRN    lactobacillus sp. 50 billion cpu (BIO-K PLUS) capsule 1 Cap  1 Cap Oral DAILY    magnesium oxide (MAG-OX) tablet 400 mg  400 mg Oral DAILY    gabapentin (NEURONTIN) capsule 300 mg  300 mg Oral Q8H    furosemide (LASIX) tablet 20 mg  20 mg Oral DAILY    HYDROmorphone (DILAUDID) tablet 2-4 mg  2-4 mg Oral Q4H PRN    potassium chloride (KLOR-CON) packet 20 mEq  20 mEq Oral DAILY    polyethylene glycol (MIRALAX) packet 17 g  17 g Oral DAILY    amiodarone (CORDARONE) tablet 200 mg  200 mg Oral DAILY    aspirin chewable tablet 81 mg  81 mg Oral DAILY WITH BREAKFAST    cholecalciferol (VITAMIN D3) tablet 2,000 Units  2,000 Units Oral DAILY    ferrous sulfate tablet 325 mg  1 Tab Oral DAILY WITH BREAKFAST    ascorbic acid (vitamin C) (VITAMIN C) tablet 250 mg  250 mg Oral DAILY WITH BREAKFAST    zinc sulfate (ZINCATE) capsule 220 mg  1 Cap Oral DAILY    atorvastatin (LIPITOR) tablet 40 mg  40 mg Oral QHS    metoprolol tartrate (LOPRESSOR) tablet 25 mg  25 mg Oral Q12H    cyanocobalamin tablet 1,000 mcg  1,000 mcg Oral DAILY    WARFARIN INFORMATION NOTE (COUMADIN)   Other Rx Dosing/Monitoring  ampicillin-sulbactam (UNASYN) 3 g in 0.9% sodium chloride (MBP/ADV) 100 mL MBP  3 g IntraVENous Q6H       Allergies: Allergies   Allergen Reactions    Morphine Other (comments)     Patient gets confused with morphine.        Functional Progress:    OCCUPATIONAL THERAPY    ON ADMISSION MOST RECENT   Eating  Functional Level: 6   Eating  Functional Level: 6     Grooming  Functional Level: 7   Grooming  Functional Level: 7     Bathing  Functional Level: 4   Bathing  Functional Level: 4     Upper Body Dressing  Functional Level: 7   Upper Body Dressing  Functional Level: 7     Lower Body Dressing  Functional Level: 4   Lower Body Dressing  Functional Level: 4     Toileting  Functional Level: 3   Toileting  Functional Level: 5     Toilet Transfers  Toilet Transfer Score: 4   Toilet Transfers  Toilet Transfer Score: 4     Tub /Shower Transfers  Tub/Shower Transfer Score: 4   Tub/Shower Transfers  Tub/Shower Transfer Score: 4       Legend:   7 - Independent   6 - Modified Independent   5 - Standby Assistance / Supervision / Set-up   4 - Minimum Assistance / Contact Guard Assistance   3 - Moderate Assistance   2 - Maximum Assistance   1 - Total Assistance / Dependent       Lab/Data Review:  Recent Results (from the past 24 hour(s))   HGB & HCT    Collection Time: 08/31/17  5:00 AM   Result Value Ref Range    HGB 8.4 (L) 13.0 - 16.0 g/dL    HCT 26.4 (L) 36.0 - 48.0 %   PROTHROMBIN TIME + INR    Collection Time: 08/31/17  5:00 AM   Result Value Ref Range    Prothrombin time 23.6 (H) 11.5 - 15.2 sec    INR 2.2 (H) 0.8 - 1.2     PLATELET COUNT    Collection Time: 08/31/17  5:00 AM   Result Value Ref Range    PLATELET 620 (H) 114 - 420 K/uL       Estimated Glomerular Filtration Rate:  On admission, estimated GFR based on a Creatinine of 0.62 mg/dl:   Using CKD-EPI = 108.6 mL/min/1.73m2   Using MDRD = 141.1 mL/min/1.73m2  Most recent estimated GFR, based on a Creatinine of 0.60 mg/dl on 8/28/2017:   Using CKD-EPI = 110.1 mL/min/1.73m2   Using MDRD = 146.6 mL/min/1.73m2       Assessment:     Primary Rehabilitation Diagnosis  1. Impaired Mobility and ADLs  2. S/P Left above-the-knee amputation (8/18/2017 - Dr. Starlett Lefort)  3. History of ischemic rest pain of the left lower extremity due to severe peripheral vascular disease      Comorbidities   Benign hypertensive heart disease with systolic congestive heart failure, NYHA class 2  I11.0, I50.20    DDD (degenerative disc disease), lumbar M51.36    Erectile dysfunction N52.9    Spinal stenosis of lumbar region with radiculopathy M48.06, M54.16    Hereditary peripheral neuropathy G60.9    Aortoiliac occlusive disease  I74.09    Atherosclerosis of native artery of both lower extremities with intermittent claudication  I70.213    Peripheral artery disease  I73.9    Coronary artery disease involving native coronary artery of native heart I25.10    Paroxysmal atrial fibrillation  I48.0    Acute blood loss as cause of postoperative anemia D62    Anticoagulated on Coumadin Z51.81, Z79.01    Ischemic cardiomyopathy I25.5    Chronic systolic heart failure  B18.00    Carotid artery disease  I77.9    Dyslipidemia E78.5    Euthyroid sick syndrome E07.81    Aftercare following surgery of the circulatory system Z48.812    Status post femoral-popliteal bypass surgery Z95.828    History of cardioversion Z98.890    Critical ischemia of right lower extremity I99.8    History of vitamin D deficiency Z86.39    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    Infection of vascular bypass graft  T82. 7XXA    Chronic anemia D64.9    Chronic ulcer of right heel (HCC) L97.419    Prolonged Q-T interval on ECG R94.31    Aftercare for amputation stump Z47.81    Moderate to severe pulmonary hypertension  I27.2    Adverse effect of amiodarone T46.2X5A    Skin ulcer of malleolar area of right ankle  L97.319         Plan:     1.  Justification for continued stay: Good progression towards established rehabilitation goals. 2. Medical Issues being followed closely:    [x]  Fall and safety precautions     [x]  Wound Care     [x]  Bowel and Bladder Function     [x]  Fluid Electrolyte and Nutrition Balance     [x]  Pain Control      3. Issues that 24 hour rehabilitation nursing is following:    [x]  Fall and safety precautions     [x]  Wound Care     [x]  Bowel and Bladder Function     [x]  Fluid Electrolyte and Nutrition Balance     [x]  Pain Control      [x]  Assistance with and education on in-room safety with transfers to and from the bed, wheelchair, toilet and shower. 4. Acute rehabilitation plan of care:    [x]  Continue current care and rehab. [x]  Physical Therapy           [x]  Occupational Therapy           []  Speech Therapy     []  Hold Rehab until further notice     5. Medications:    [x]  MAR Reviewed     [x]  Continue Present Medications     6. DVT Prophylaxis:      []  Lovenox     []  Unfractionated Heparin     [x]  Coumadin     []  NOAC     []  VASQUEZ Stockings     []  Sequential Compression Device     []  None     7. Orders:   > S/P Left above-the-knee amputation (8/18/2017 - Dr. Audrey Zaidi); History of ischemic rest pain of the left lower extremity due to severe peripheral artery disease   > Wound vac dressing changes by staff nurses every Monday, Wednesday, & Friday on day shift. Measure wound size & document with each dressing change. Settings: 125mmHG low continuous suction. Measure wound size & document with each dressing change. Change canister when 3/4 full. May use saline dressings daily until wound vac initiated.   > Mepilex Border dressings to left AKA staple line q2days & prn soilage. Wrap with ace wrap in figure 8 form.   > Staples to be removed on 9/15/2017    > S/P Removal of infected graft; Repair of left superficial femoral artery; Repair of left common femoral artery (7/25/2017 - Dr. Audrey Zaidi);  History of sepsis associated with infection of vascular bypass graft   > Continue Ampicillin Sulbactam 3 grams IVPB q 6 hr (STOP DATE: 9/19/2017)    > Acute Postoperative Blood Loss Anemia   > Hgb/Hct (8/24/2017, prior to admission to the ARU) = 8.4/25.6   > Anemia work-up (8/22/2017) showed serum iron 15, TIBC 146, iron % saturation 10   > Hgb/Hct (8/26/2017, on admission) = 8.6/26.5   > Reticulocyte count (8/26/2017) = 4.7   > Ferritin (8/26/2017) = 1066   > Hgb/Hct (8/28/2017) = 9.0/27.9    > Hgb/Hct (8/31/2017) = 8.4/26.4   > Continue:    > FeSO4 325 mg PO once daily with breakfast     > Ascorbic Acid 250 mg PO once daily with breakfast (to enhance the absorption of the FeSO4)     > Benign hypertensive heart disease with chronic systolic heart failure   > On 8/26/2017, increased Losartan from 50 mg to 100 mg PO once daily (6AM)   > Continue:    > Furosemide 20 mg PO once daily (6AM)    > Losartan 100 mg PO once daily (6AM)    > Metoprolol tartrate 25 mg PO q 12 hr (9AM, 9PM)    > Paroxysmal atrial fibrillation, presently normal sinus rhythm, anticoagulated on Coumadin   > Continue:    > Amiodarone 200 mg PO once daily    > Metoprolol tartrate 25 mg PO q 12 hr (9AM, 9PM)   > Target INR = 2 to 3   > INR 2.2   > Patient received Coumadin 5 mg PO last night   > Coumadin 5 mg PO tonight    > Chronic ulcer of right heel    > Apply rigid heel suspension boots on both feet when supine in bed   > Podiatry follow-up (Dr. Socorro Chung) called by Vascular Surgery for comanagement   > Arterial duplex ultrasound (8/29/2017) showed patent common femoral to popliteal artery bypass graft without evidence of a hemodynamically significant stenosis. > Continue:    > Ascorbic acid 250 mg PO once daily with breakfast    > Zinc sulfate 220 mg PO once daily    > Skin ulcer of malleolar area of right ankle   > Cleanse wound with saline, apply Aquacel ag dressing, Mepilex Border to right lateral ankle wound every 48hrs & prn soilage.     > Analgesia   > Continue:    > Acetaminophen 650 mg PO q 4 hr PRN for pain level less than 5/10    > Hydromorphone 2-4 mg PO q 4 hr PRN for pain level greater than 4/10      8. Patient's progress in rehabilitation and medical issues discussed with the patient. All questions answered to the best of my ability. Care plan discussed with patient and nurse.       Signed:    Chandana Fontaine MD    August 31, 2017

## 2017-08-31 NOTE — INTERDISCIPLINARY ROUNDS
[facility] PHYSICAL REHABILITATION  [address] [place] [address], [address]    INPATIENT REHABILITATION  PRE-TEAM CONFERENCE SUMMARY     Date of Conference: 9/1/2017    Name: Meredith Knight Age / Sex: 59 y.o. / male   CSN: [id number] MRN: [id number]   [facility] Date: 8/25/2017 Length of Stay: 6 days     Primary Rehabilitation Diagnosis  1. Impaired Mobility and ADLs  2. S/P Left above-the-knee amputation (8/18/2017 - Dr. Laura Caraballo)  3. History of ischemic rest pain of the left lower extremity due to severe peripheral vascular disease      Comorbidities   Benign hypertensive heart disease with systolic congestive heart failure, NYHA class 2  I11.0, I50.20    DDD (degenerative disc disease), lumbar M51.36    Erectile dysfunction N52.9    Spinal stenosis of lumbar region with radiculopathy M48.06, M54.16    Hereditary peripheral neuropathy G60.9    Aortoiliac occlusive disease  I74.09    Atherosclerosis of native artery of both lower extremities with intermittent claudication  I70.213    Peripheral artery disease  I73.9    Coronary artery disease involving native coronary artery of native heart I25.10    Paroxysmal atrial fibrillation  I48.0    Acute blood loss as cause of postoperative anemia D62    Anticoagulated on Coumadin Z51.81, Z79.01    Ischemic cardiomyopathy I25.5    Chronic systolic heart failure  W80.86    Carotid artery disease  I77.9    Dyslipidemia E78.5    Euthyroid sick syndrome E07.81    Aftercare following surgery of the circulatory system Z48.812    Status post femoral-popliteal bypass surgery Z95.828    History of cardioversion Z98.890    Critical ischemia of right lower extremity I99.8    History of vitamin D deficiency Z86.39    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    Infection of vascular bypass graft  T82. 7XXA    Chronic anemia D64.9    Chronic ulcer of right heel (HCC) L97.419    Prolonged Q-T interval on ECG R94.31    Aftercare for amputation stump Z47.81    Moderate to severe pulmonary hypertension  I27.2    Adverse effect of amiodarone T46.2X5A    Skin ulcer of malleolar area of right ankle  L97.319           Therapy:     FIM SCORES Initial Assessment Weekly Progress Assessment 8/31/2017   Eating Functional Level: 6  Comments: pt has dentures, independent with self feeding  6 mod independent   Swallowing     Grooming 7  7 independent   Bathing 4      6 mod independent   Upper Body Dressing Functional Level: 7  Items Applied/Steps Completed: Pullover (4 steps)   6 mod independent   Lower Body Dressing Functional Level: 4  Items Applied/Steps Completed: Elastic waist pants (3 steps), Sock, right (1 step)  Comments: pt donned pants seated on edge of bed, assist to thread wound vac tube through pant leg, CGA to pullup pants  5 -Supervision   Toileting Functional Level: 3  Comments: min assist to pull up clothing due to decreased balance   5 -Supervision   Bladder - level of assist 4 5   Bladder - accident frequency score 6 6   Bowel - level of assist 3 5   Bowel - accident frequency score 4     Toilet Transfer [place] Toilet Transfer Score: 4  Comments: min assist stand pivot transfer from w/c to commode with grab bars or RW, assist due to decreased balance and decreased confidence with transfers  4 CGA   Tub/Shower Transfer [place] Tub or Shower Type: Tub/Shower combination  Tub/Shower Transfer Score: 4  Comments: min assist stand pivot to tub bench stand pivot transfer from w/c to tu     4-CGA   Comprehension Primary Mode of Comprehension: Auditory  Score: 6 Auditory  6   Expression Primary Mode of Expression: Verbal  Score: 6 Verbal  6   Social Interaction Score: 5 5   Problem Solving Score: 4 5   Memory Score: 4 5     FIM SCORES Initial Assessment Weekly Progress Assessment 8/31/2017   Bed/Chair/Wheelchair Transfers Transfer Type:  Other  Other: Stand-step with RW with CGA/SBA  Transfer Assistance : 4 (Contact guard assistance)  Sit to Stand Assistance: Contact guard assistance  Car Transfers: Not tested  Car Type: n/a Transfer type:  Other  Other: stand hop to RW with S.  Transfer Assistance: 5 (Supervision)  Sit to stand assistance: Supervision  Car transfers: 4 (Contact guard assistance)   Bed Mobility Rolling Right 7 (Independent)   Rolling Left 7 (Independent)   Supine to Sit 7 (Independent)   Sit to Stand Contact guard assistance   Sit to Supine 7 (Independent)    Rolling Right    7   Rolling Left    7   Supine to Sit    7   Sit to Stand    Supervision   Sit to Supine    Supervision      Locomotion (W/C) Able to Propel (ft): 250 feet  Functional Level: 6  Curbs/Ramps Assist Required (FIM Score): 6 (Modified independent)  Wheelchair Setup Assist Required : 6 (Modified independent)  Wheelchair Management: Manages right brake, Manages left brake Able to propel (FT) 250 ft  Functional level 6  Curbs/ramps assist-6  Setup Assistance-6         Locomotion (W/C distance) 250 Feet  250 ft   Locomotion (Walk) 4 (Contact guard assistance)  5 (supervision)      Locomotion (Walk dist.) 40 Feet (ft) (x2 reps)  125 ft x 1   Steps/Stairs Steps/Stairs Ambulated (#):  (3 4 inch)  Level of Assist : 0 (Not tested)  Rail Use: Both  4 inch step x 3 with min/modA and with B HRs         Nursing:     Neuro:   A&O x_4___                   Respiratory:   [x] WNL   [] O2   [] LPM ______   Other:  Peripheral Vascular:   [x] TEDS present   [] Edema present ____ Grade   Cardiac:   [x] WNL   [] Other  Genitourinary:   [x] continent   [] incontinent   [] lopez  Abdominal _______ LBM  GI: _cardiac______ Diet __thin____ Liquids ___no__ tube feeds  Musculoskeletal: _active___ ROM Transfers _wheelchair____ Assistive Device Used  _1 x person assist___ Level of Assistance  Skin Integumentary:   [] Intact   [x] Not Intact   _incision_________Preventative Measures  Details__reposition, dresssing change____________________________________________________________  Pain: [] Controlled [x] Not Controlled   Pain Meds:   [] Scheduled   [x] PRN        Registered Dietitian / Nutrition:   Patient Vitals for the past 100 hrs:   % Diet Eaten   08/31/17 0930 100 %   08/30/17 1829 85 %   08/30/17 1312 95 %   08/30/17 1015 100 %   08/29/17 1812 100 %   08/29/17 1342 85 %   08/29/17 0935 100 %   08/28/17 1824 85 %   08/28/17 1240 100 %   08/28/17 0940 100 %     Pt unavailable at time of visit. Has good/excellent meal intake per chart    Supplements:          [x] Yes: Ensure Enlive BID   [] No      Amount of supplement consumed:  unable to assess at time of visit      Intake/Output Summary (Last 24 hours) at 08/31/17 1214  Last data filed at 08/31/17 1000   Gross per 24 hour   Intake              600 ml   Output             3901 ml   Net            -3301 ml                                Last bowel movement:  8/30      Interdisciplinary Team Goals:     1. Goal  Pt to be able to negotiate 3 steps with 2 handrails and with min/CGA. Barrier  Increased pain and numbness of R foot and great toe, decreased strength, decreased endurance and safety awareness. Intervention  Strength, mobility, safety, pain management training. 2. Goal  OT: Pt to massage his residual limb for desensitization. Barrier  wound vac is preventing pt to preform to his full potential      Intervention  Con't with education on residual limb. 3. Goal      Barrier      Intervention       4. Goal  Nursing: patient will have no pain during rehab stay    Barrier  surgery    Intervention  To assess pain every 4 hours and give pain medicine as need. 5. Goal  Address all safety: dependency versus independence    Barrier  Patient's focus on return to independence at expense of safety and forced dependency    Intervention  Patient education and support      6. Goal  Po intake of meals will meet >75% of patient estimated nutritional needs within the next 7 days.   Outcome:  [x] Met/Ongoing    []  Not Met    [] New/Initial Goal Barrier  none known     Intervention  modified diet; nutrition supplement       Disposition / Discharge Planning: Follow-up therapy services:  tbd   DME recommendations: tbd   Estimated discharge date: 9/8/17   Discharge Location: home         Electronic Signatures:      Signature Date Signed   Physical Therapist    MIKE Carranza, PT, DPT 8/31/17 8/31/17   Occupational Therapist    Jeff GILLILAND/IWONA Muniz, [address]  8/31/17 9/1/2017   Speech Therapist         Recreational Therapist    Cayla Ramachandran 8/31/17   Nursing    Rohan Corral, RN  8/31/17   Dietitian    Jaswant Davison, ASHLEY  8/31/17   Clinical Psychologist    Jhony Burciaga, PhD 8/31/17    Physician    Chandrakant Hernandez MD   8/31/2017        Sophie Henry, ARNOLD  8/31/17         The above information has been reviewed with the patient in a language that they can understand. Opportunity for comments and questions has been provided and a signed attestation has been scanned into the \"media tab\" of the EMR.       Patient Signature: ______________________________________________________    Date Signed: __________________________________________________________

## 2017-08-31 NOTE — PROGRESS NOTES
Problem: Self Care Deficits Care Plan (Adult)  Goal: *Therapy Goal (Edit Goal, Insert Text)  Long Term Goals (to be met upon discharge date) in order to increase pts functional independence and safety, and decrease burden of care:  1. Pt will perform self-feeding with independence. 2. Pt will perform grooming with independent. 3. Pt will perform UB bathing with independence. 4. Pt will perform LB bathing with Mod I.  5. Pt will perform tub/shower transfer with Mod I.   6. Pt will perform UB dressing with independence. 7. Pt will perform LB dressing with Mod Iindependence. 8. Pt will perform toileting task with Mod independence. 9. Pt will perform toilet transfer with Mod I. Short Term Weekly Goals for (17 to 17) in order to increase pts functional independence and safety, and decrease burden of care:  3. Pt will perform UB bathing with independence. 4. Pt will perform LB bathing with Supervision. 5. Pt will perform tub/shower transfer with supervision. 6. Pt will perform UB dressing with Supervision. 7. Pt will perform LB dressing with Supervision. 8. Pt will perform toileting task with Supervision. 9. Pt will perform toilet transfer with Supervision. OCCUPATIONAL THERAPY DAILY NOTE  Patient Name:Mj Stacy Paty  Time In: 6921  Time Out: 2555  Time in 300  Time out 18     Medical Diagnosis:  Left AKA  Status post above knee amputation of left lower extremity ([facility] [address] Status post above knee amputation of left lower extremity (HCC)      Pain at start of tx:o/10  Pain at stop of tx:0/10     Patient identified with name and :yes     Subjective: Pt denies having pain. Objective: Pt seen for therapeutic activities. THERAPEUTIC ACTIVITY Daily Assessment     Pt seated unsupportive with two pound wrist weight. Pt participated with Eddie increasing core strengthening and UE with reaching.        THERAPEUTIC EXERCISE Daily Assessment     Pt seated unsupportive performing triceps press 3x10 reps with 1km with L UE and no additional weight with R UE. Pt needed min assist for controls movement with B's UE and assist with R UE from extension  flexion 2/2 decrease strength. Pt rest between set 2/2 fatigue. Then with 3# dowel pt preforms 3x12 reps with overhead horizontal ADD/ABD and forward and backward [place]. Pt challenge to preforms one set of each reps without rest, pt needed to rest once 2/2 fatigue. Arm bike strengthening for 15 minutes with resistance. Wheel chair push-up 3x10 reps with rest between set 2/2 fatigue. Assessment: Pt demonstrating increase UE strength. Pt fatigue easily with triceps press and overhead exercise.      Plan of Care: Con't plan of care with UE strengthening     Brain Peon DARSHANA/L  8/31/2017

## 2017-08-31 NOTE — PROGRESS NOTES
Sw called verbal clinical updates to pt's  to seek auth extension with planned dc of 9/8. Dary will follow.

## 2017-09-01 LAB
INR PPP: 2.5 (ref 0.8–1.2)
PROTHROMBIN TIME: 25.9 SEC (ref 11.5–15.2)

## 2017-09-01 PROCEDURE — 97116 GAIT TRAINING THERAPY: CPT

## 2017-09-01 PROCEDURE — 74011250637 HC RX REV CODE- 250/637: Performed by: INTERNAL MEDICINE

## 2017-09-01 PROCEDURE — 85610 PROTHROMBIN TIME: CPT | Performed by: INTERNAL MEDICINE

## 2017-09-01 PROCEDURE — 74011250636 HC RX REV CODE- 250/636: Performed by: INTERNAL MEDICINE

## 2017-09-01 PROCEDURE — 97110 THERAPEUTIC EXERCISES: CPT

## 2017-09-01 PROCEDURE — 97530 THERAPEUTIC ACTIVITIES: CPT

## 2017-09-01 PROCEDURE — 77030019934 HC DRSG VAC ASST KCON -B

## 2017-09-01 PROCEDURE — 77030011256 HC DRSG MEPILEX <16IN NO BORD MOLN -A

## 2017-09-01 PROCEDURE — 74011000258 HC RX REV CODE- 258: Performed by: INTERNAL MEDICINE

## 2017-09-01 PROCEDURE — 65310000000 HC RM PRIVATE REHAB

## 2017-09-01 PROCEDURE — 36415 COLL VENOUS BLD VENIPUNCTURE: CPT | Performed by: INTERNAL MEDICINE

## 2017-09-01 RX ADMIN — METOPROLOL TARTRATE 25 MG: 25 TABLET, FILM COATED ORAL at 21:37

## 2017-09-01 RX ADMIN — POTASSIUM CHLORIDE 20 MEQ: 1.5 POWDER, FOR SOLUTION ORAL at 08:40

## 2017-09-01 RX ADMIN — WARFARIN SODIUM 3 MG: 2 TABLET ORAL at 17:31

## 2017-09-01 RX ADMIN — AMPICILLIN AND SULBACTAM 3 G: 1; 2 INJECTION, POWDER, FOR SOLUTION INTRAMUSCULAR; INTRAVENOUS at 08:51

## 2017-09-01 RX ADMIN — LOSARTAN POTASSIUM 100 MG: 50 TABLET ORAL at 06:09

## 2017-09-01 RX ADMIN — POLYETHYLENE GLYCOL 3350 17 G: 17 POWDER, FOR SOLUTION ORAL at 18:01

## 2017-09-01 RX ADMIN — HYDROMORPHONE HYDROCHLORIDE 4 MG: 2 TABLET ORAL at 08:47

## 2017-09-01 RX ADMIN — AMIODARONE HYDROCHLORIDE 200 MG: 200 TABLET ORAL at 08:48

## 2017-09-01 RX ADMIN — GABAPENTIN 300 MG: 300 CAPSULE ORAL at 14:21

## 2017-09-01 RX ADMIN — FUROSEMIDE 20 MG: 20 TABLET ORAL at 08:40

## 2017-09-01 RX ADMIN — Medication 400 MG: at 08:40

## 2017-09-01 RX ADMIN — ZINC SULFATE 220 MG (50 MG) CAPSULE 220 MG: CAPSULE at 08:40

## 2017-09-01 RX ADMIN — ATORVASTATIN CALCIUM 40 MG: 40 TABLET, FILM COATED ORAL at 21:37

## 2017-09-01 RX ADMIN — HYDROMORPHONE HYDROCHLORIDE 4 MG: 2 TABLET ORAL at 03:27

## 2017-09-01 RX ADMIN — DOCUSATE SODIUM 100 MG: 100 CAPSULE, LIQUID FILLED ORAL at 08:40

## 2017-09-01 RX ADMIN — Medication 250 MG: at 08:41

## 2017-09-01 RX ADMIN — CYANOCOBALAMIN TAB 1000 MCG 1000 MCG: 1000 TAB at 08:40

## 2017-09-01 RX ADMIN — HYDROMORPHONE HYDROCHLORIDE 4 MG: 2 TABLET ORAL at 17:30

## 2017-09-01 RX ADMIN — Medication 325 MG: at 08:41

## 2017-09-01 RX ADMIN — AMPICILLIN AND SULBACTAM 3 G: 1; 2 INJECTION, POWDER, FOR SOLUTION INTRAMUSCULAR; INTRAVENOUS at 20:35

## 2017-09-01 RX ADMIN — Medication 1 CAPSULE: at 08:40

## 2017-09-01 RX ADMIN — AMPICILLIN AND SULBACTAM 3 G: 1; 2 INJECTION, POWDER, FOR SOLUTION INTRAMUSCULAR; INTRAVENOUS at 14:45

## 2017-09-01 RX ADMIN — METOPROLOL TARTRATE 25 MG: 25 TABLET, FILM COATED ORAL at 08:40

## 2017-09-01 RX ADMIN — AMPICILLIN AND SULBACTAM 3 G: 1; 2 INJECTION, POWDER, FOR SOLUTION INTRAMUSCULAR; INTRAVENOUS at 03:18

## 2017-09-01 RX ADMIN — GABAPENTIN 300 MG: 300 CAPSULE ORAL at 06:08

## 2017-09-01 RX ADMIN — ASPIRIN 81 MG 81 MG: 81 TABLET ORAL at 08:41

## 2017-09-01 RX ADMIN — DOCUSATE SODIUM 100 MG: 100 CAPSULE, LIQUID FILLED ORAL at 17:31

## 2017-09-01 RX ADMIN — VITAMIN D, TAB 1000IU (100/BT) 2000 UNITS: 25 TAB at 08:40

## 2017-09-01 RX ADMIN — GABAPENTIN 300 MG: 300 CAPSULE ORAL at 21:37

## 2017-09-01 NOTE — PROGRESS NOTES
Problem: Self Care Deficits Care Plan (Adult)  Goal: *Therapy Goal (Edit Goal, Insert Text)  Long Term Goals (to be met upon discharge date) in order to increase pts functional independence and safety, and decrease burden of care:  1. Pt will perform self-feeding with independence. 2. Pt will perform grooming with independent. 3. Pt will perform UB bathing with independence. 4. Pt will perform LB bathing with Mod I.  5. Pt will perform tub/shower transfer with Mod I.   6. Pt will perform UB dressing with independence. 7. Pt will perform LB dressing with Mod Iindependence. 8. Pt will perform toileting task with Mod independence. 9. Pt will perform toilet transfer with Mod I. Short Term Weekly Goals for (17 to 17) in order to increase pts functional independence and safety, and decrease burden of care:  3. Pt will perform UB bathing with independence. 4. Pt will perform LB bathing with Supervision. 5. Pt will perform tub/shower transfer with supervision. 6. Pt will perform UB dressing with Supervision. 7. Pt will perform LB dressing with Supervision. 8. Pt will perform toileting task with Supervision. 9. Pt will perform toilet transfer with Supervision. Outcome: Progressing Towards Goal  OCCUPATIONAL THERAPY DAILY NOTE  Patient Name:Mj Gamboamorteza     Time In: 0  Time Out: 0     Medical Diagnosis:  Left AKA  Status post above knee amputation of left lower extremity ([facility] [address] Status post above knee amputation of left lower extremity (HCC)      Pain at start of tx:pt stated no pain level  Pain at stop of tx:pt stated no pain level     Patient identified with name and :yes  Subjective: \"Let's do this. \"     Objective:      THERAPEUTIC ACTIVITY Daily Assessment     Pt participated in tabletop activity in standing to challenge dynamic standing balance playing a card game. Pt utilized FWW for balance and used hand to reach across midline to  cards and place in front of him.  Pt stood for 1 trial (10:09). Pt participated in tabletop activity seated w/c level to perform FM activity, placing small pegs into pegboard and following a pattern. Pt utilized B fingers to manipulate the small pegs in order to place in pegboard for carryover to ADL performance to include buttoning buttons and zipping zippers. THERAPEUTIC EXERCISE Daily Assessment     Pt utilized armcycle for 17 min with no rest breaks required to improve cardio endurance for carryover to performing functional tasks. Pt participated in [address] [place] [address] therapeutic exercise program in order to improve BUE strength for improved ability to perform functional tasks and mobility. Pt utilized 3# dowel to perform chest presses, shoulder presses, bicep curls and tricep curls (no weight) - 3 sets x 15 reps each. Pt then utilized green theraband to perform shoulder adduction/abduction exercises (2 sets x 10 reps each side) to improve B shoulder strength for functional mobility. Assessment: Pt demonstrated improved cardio endurance with decreased fatigue during treatment session. Plan of Care: Continue POC to facilitate maximum independence and safety with ADLs and functional tasks.      [address] [place] [address], [place]WONA  9/1/2017

## 2017-09-01 NOTE — ROUTINE PROCESS
SHIFT CHANGE NOTE FOR [facility]    Bedside and Verbal shift change report given to Adarsh Lee RN (oncoming nurse) by Aida Hanson LPN   (offgoing nurse). Report included the following information SBAR, Kardex, MAR and Recent Results. Situation:   Code Status: Full Code   Reason for Admission: AKA  Hospital Day: 7   Problem List:   Hospital Problems  Date Reviewed: 9/1/2017          Codes Class Noted POA    Skin ulcer of malleolar area of right ankle ([facility] [facility] (Chronic) ICD-10-CM: L97.319  ICD-9-CM: 707.13  Unknown Yes        * (Principal)Status post above knee amputation of left lower extremity ([facility] [address] ICD-10-CM: P00.949  ICD-9-CM: V49.76  8/18/2017 Yes    Overview Signed 8/21/2017 10:21 PM by Paola Jaime MD     S/P Left above-the-knee amputation (8/18/2017 - Dr. Laura Caraballo)               Aftercare for amputation stump ICD-10-CM: Z47.81  ICD-9-CM: V54.89  8/18/2017 Yes    Overview Signed 8/21/2017 10:25 PM by Paola Jaime MD     S/P Left above-the-knee amputation (8/18/2017 - Dr. Laura Caraballo)             Ischemic rest pain of lower extremity ([facility] [address] ICD-10-CM: I73.9  ICD-9-CM: 443.9  8/14/2017 Yes    Overview Signed 8/15/2017 11:09 AM by Paola Jaime MD     Left             Chronic ulcer of right heel ([facility] (Chronic) ICD-10-CM: L97.419  ICD-9-CM: 707.14  8/8/2017 Yes        Acute blood loss as cause of postoperative anemia ICD-10-CM: D62  ICD-9-CM: 285.1  7/25/2017 Yes        Aftercare following surgery of the circulatory system ICD-10-CM: Z48.812  ICD-9-CM: V58.73  7/25/2017 Yes    Overview Addendum 8/21/2017 10:22 PM by Paola Jaime MD     S/P Removal of infected graft; Repair of left superficial femoral artery; Repair of left common femoral artery (7/25/2017 - Dr. Laura Caraballo); S/P Right axillary femoral jump bypass interposition; Removal of infected right axillary femoral bypass;  Wound VAC placement of upper thigh 1.2 cm x 5.2 cm x 1.8 cm and groin 4 cm x 0.5 cm x 0.2 cm (8/18/2017 - Dr. Laura Caraballo)             Impaired mobility and ADLs ICD-10-CM: Z74.09  ICD-9-CM: 799.89  7/24/2017 Yes        Infection of vascular bypass graft [facility]) ICD-10-CM: T82. 7XXA  ICD-9-CM: 996.62  7/24/2017 Yes    Overview Signed 8/7/2017  2:19 PM by Paola Jaime MD     Left lower extremity             Anticoagulated on Coumadin (Chronic) ICD-10-CM: Z51.81, Z79.01  ICD-9-CM: V58.83, V58.61  Unknown Yes        Paroxysmal atrial fibrillation (HCC) ICD-10-CM: I48.0  ICD-9-CM: 427.31  Unknown Yes        Peripheral artery disease ([facility] [address] [facility] (Chronic) ICD-10-CM: I73.9  ICD-9-CM: 443.9  1/25/2017 Yes        Benign hypertensive heart disease with systolic congestive heart failure, NYHA class 2 (HCC) (Chronic) ICD-10-CM: I11.0, I50.20  ICD-9-CM: 402.11, 428.20, 428.0  1/26/2015 Yes              Background:   Past Medical History:   Past Medical History:   Diagnosis Date    Acute blood loss as cause of postoperative anemia 4/4/2017    Anticoagulated on Coumadin     Aortoiliac occlusive disease ([address] 1/25/2017    Atherosclerosis of native artery of both lower extremities with intermittent claudication ([address] 1/25/2017    Benign hypertensive heart disease with systolic congestive heart failure, NYHA class 2 ([address] 1/26/2015    Carotid artery disease (HCC)     Chronic anemia     Chronic systolic heart failure (HCC)     Chronic ulcer of right foot ([address] 4/4/2017    Chronic ulcer of right heel ([address] 8/8/2017    Coronary artery disease involving native coronary artery of native heart 3/15/2017    Successful stenting of Cx (Xience LESLEY) and RCA (Xience LESLEY) to 0% by Dr. Denis Powell on 3/15/17.     DDD (degenerative disc disease), lumbar 1/26/2015    Dyslipidemia     Erectile dysfunction 7/5/2016    Euthyroid sick syndrome 4/6/2017    Hereditary peripheral neuropathy 11/15/2016    History of cardioversion 4/18/2017    S/P Synchronized external cardioversion (4/18/2017 - Dr. Chantel Martin)    History of vitamin D deficiency 4/22/2017    Vitamin D 25-Hydroxy (4/22/2017) = 12.0; Vitamin D 25-Hydroxy (5/26/2017) = 38.7    Infection of vascular bypass graft ([facility] 7/24/2017    Left lower extremity    Ischemic cardiomyopathy     Moderate to severe pulmonary hypertension ([facility] 7/23/2017    Paroxysmal atrial fibrillation (HCC)     Peripheral artery disease ([facility] 1/25/2017    Skin ulcer of malleolar area of right ankle ([facility]     Spinal stenosis of lumbar region with radiculopathy 5/4/2015    Dr. Baron Matias      Patient taking anticoagulants yes   Patient has a defibrillator: no     Assessment:   Changes in Assessment throughout shift: no     Patient has central line:yes Reasons if yes: long term antibiotic  Insertion date: 08/24/17 Last dressing date:08/31/17   Patient has Jiang Cath: no Reasons if yes:     Insertion date:     Last Vitals:     Vitals:    08/31/17 2005 09/01/17 0609 09/01/17 0809 09/01/17 1653   BP: 135/58 138/65 145/65 133/61   Pulse: 67  (!) 58 65   Resp: 18  18 18   Temp: 98.7 °F (37.1 °C)  96.8 °F (36 °C) 97.4 °F (36.3 °C)   SpO2: 100%  100% 98%   Weight:       Height:            PAIN    Pain Assessment    Pain Intensity 1: 4 (09/01/17 1600) Pain Intensity 1: 2 (12/29/14 1105)    Pain Location 1: Leg Pain Location 1: Abdomen    Pain Intervention(s) 1: Medication (see MAR) Pain Intervention(s) 1: Medication (see MAR)  Patient Stated Pain Goal: 0 Patient Stated Pain Goal: 0  o Intervention effective: no    o Other actions taken for pain: pain medicine     Skin Assessment  Skin color Skin Color: Appropriate for ethnicity  Condition/Temperature Skin Condition/Temp: Warm  Integrity Skin Integrity: Incision (comment)  Turgor Turgor: Non-tenting  Weekly Pressure Ulcer Documentation  Pressure  Injury Documentation: No Pressure Injury Noted-Pressure Ulcer Prevention Initiated  Wound Prevention & Protection Methods  Orientation of wound Orientation of Wound Prevention: Posterior  Location of Prevention Location of Wound Prevention: Buttocks, Sacrum/Coccyx  Dressing Present Dressing Present : No  Dressing Status Dressing Status: Intact  Wound Offloading Wound Offloading (Prevention Methods): Bed, pressure redistribution/air, Bed, pressure reduction mattress     INTAKE/OUPUT    Date 08/31/17 0700 - 09/01/17 0659 09/01/17 0700 - 09/02/17 0659   Shift 0700-1859 1900-0659 24 Hour Total 0700-1859 1900-0659 24 Hour Total   I  N  T  A  K  E   P. O. 600  600 360  360      P. O. 600  600 360  360    Shift Total  (mL/kg) 600  (8.5)  600  (8.5) 360  (5.1)  360  (5.1)   O  U  T  P  U  T   Urine  (mL/kg/hr) 1800  (2.1) 350  (0.4) 2150  (1.3) 250  250      Urine Voided 1813 825 6314 250  250      Urine Occurrence(s) 3 x 7 x 10 x 2 x  2 x    Stool            Stool Occurrence(s) 0 x 1 x 1 x 1 x  1 x    Shift Total  (mL/kg) 1800  (25.4) 350  (4.9) 2150  (30.4) 250  (3.5)  250  (3.5)   NET -1200 350 -1550 110  110   Weight (kg) 70.8 70.8 70.8 70.8 70.8 70.8       Recommendations:  1. Patient needs and requests: pain medicine    2. Diet: cardiac    3. Pending tests/procedures: no     4. Functional Level/Equipment: 1 x person assist    5. Estimated Discharge Date: TDB Posted on Whiteboard in Patients Room: no     [facility] Safety Check    A safety check occurred in the patient's room between off going nurse and oncoming nurse listed above.     The safety check included the below items  Area Items   H  High Alert Medications - Verify all high alert medication drips (heparin, PCA, etc.)   E  Equipment - Suction is set up for ALL patients (with yanker)  - Red plugs utilized for all equipment (IV pumps, etc.)  - WOWs wiped down at end of shift.  - Room stocked with oxygen, suction, and other unit-specific supplies   A  Alarms - Bed alarm is set for fall risk patients  - Ensure chair alarm is in place and activated if patient is up in a chair   L  Lines - Check IV for any infiltration  - Jiang bag is empty if patient has a Jiang   - Tubing and IV bags are labeled   S  Safety   - Room is clean, patient is clean, and equipment is clean. - Hallways are clear from equipment besides carts. - Fall bracelet on for fall risk patients  - Ensure room is clear and free of clutter  - Suction is set up for ALL patients (with cailin)  - Hallways are clear from equipment besides carts.    - Isolation precautions followed, supplies available outside room, sign posted

## 2017-09-01 NOTE — PROGRESS NOTES
Problem: Mobility Impaired (Adult and Pediatric)  Goal: *Acute Goals and Plan of Care (Insert Text)  Physical Therapy Short Term Goals  Initiated 2017 and to be accomplished within 7 day(s) 2017  1. Patient will move from supine to sit and sit to supine , scoot up and down and roll side to side in bed with independence. 2. Patient will transfer from bed to chair and chair to bed with supervision/set-up using the least restrictive device. 3. Patient will perform sit to stand with supervision/set-up. 4. Patient will ambulate with supervision/set-up for 50 feet with the least restrictive device. 5. Patient will ascend/descend 3 stairs with 2 handrail(s) with minimal assistance/contact guard assist.    Physical Therapy Long Term Goals  Initiated 2017 and to be accomplished within 14 day(s) 2017  1. Patient will move from supine to sit and sit to supine , scoot up and down and roll side to side in bed with independence. 2. Patient will transfer from bed to chair and chair to bed with modified independence using the least restrictive device. 3. Patient will perform sit to stand with modified independence. 4. Patient will ambulate with modified independence for 50 feet with the least restrictive device. 5. Patient will ascend/descend 3 stairs with 2 handrail(s) with supervision/set-up. PHYSICAL THERAPY DAILY NOTE  Patient Name:Mj Peck  Time In: 56  Time Out: 1100  Diagnosis: Left AKA  Status post above knee amputation of left lower extremity ([facility] [address] Status post above knee amputation of left lower extremity ([facility] [address]  Precautions: Fall Risk     Subjective: I'm ready to go. I will do whatever you want me to do, I want to keep getting better. Pain at start of tx:0/10  Pain at stop of tx:4/10 Right greater toe     Patient identified with name and :YES         Objective: After amb on stairs, pt reported burning in greater toe.  Upon further inspection found pt greater toenail was lightly bleeding. Nurse was informed, pt was then bandaged for nurse inspection at later date. BED/MAT MOBILITY Daily Assessment     Rolling Right : 7 (Independent)  Rolling Left : 7 (Independent)  Supine to Sit : 7 (Independent)  Sit to Supine : 7 (Independent)      Pt independent with all bed mobility and with wound vac management in bed. TRANSFERS Daily Assessment     Transfer Type: Other  Other: stand hop transfer, no AD  Transfer Assistance : 6 (Modified independent)  Sit to Stand Assistance: Supervision      Pt is able to transfer independently from w/c to bed and sit to stand but requires modified assist for wound vac management. Pt is aware of tubing but occasionally requires minimal assist for placement. GAIT Daily Assessment     Amount of Assistance: 5 (Supervision/setup)  Distance (ft):  (No value ( 153, 103x2) ft)  Assistive Device: Walker, rolling      Pt amb involves RW and hoping with use of bar UE. Pt able to amb with supervision with therapist management of w/c. Pt greater toe injury influenced tolerable distance of amb. STEPS or STAIRS Daily Assessment     Steps/Stairs Ambulated (#): 24  Level of Assist : 3 (Moderate assistance)  Rail Use: Both      Pt first stair requires mod-a due to height of rails and pt's inability to use strength of UE adequately. Second and third step require min-a. Pt requires constant VC to encourage slowed pace and safety management. BALANCE Daily Assessment     Sitting - Static: Good (unsupported)  Sitting - Dynamic: Good (unsupported)  Standing - Static: Constant support; Fair  Standing - Dynamic : Impaired          WHEELCHAIR MOBILITY Daily Assessment                LOWER EXTREMITY EXERCISES Daily Assessment     Extremity: Both  Exercise Type #1: Supine lower extremity strengthening  Sets Performed: 2  Reps Performed: 15  Level of Assist: Supervision              Assessment: Pt strength and general tolerance to ex are good and pt is showing great improvement. Pt still limited by wound vac and general amputation barrier. Plan of Care: Continue with general strengthening and functionality in preporation for future prosthetic use per poc.      Max Conway  9/1/2017

## 2017-09-01 NOTE — PROGRESS NOTES
[address]  [address] [place] [address], [address]     INPATIENT REHABILITATION  DAILY PROGRESS NOTE     Date: 9/1/2017    Name: Carlyn Nuñez Age / Sex: 59 y.o. / male   CSN: [id number] MRN: [id number]   [facility] Date: 8/25/2017 Length of Stay: 7 days     Primary Rehab Diagnosis: Impaired Mobility and ADLs secondary to:  1. S/P Left above-the-knee amputation (8/18/2017 - Dr. Ana M Suazo)  3. History of ischemic rest pain of the left lower extremity due to severe peripheral vascular disease       Subjective:     Patient seen and examined. Blood pressure controlled. Team conference was held at bedside this AM.     Patient's Complaint:   No significant medical complaints    Pain Control: stable, mild-to-moderate joint symptoms intermittently, reasonably well controlled by current meds      Objective:     Vital Signs:  Patient Vitals for the past 24 hrs:   BP Temp Pulse Resp SpO2   09/01/17 0809 145/65 96.8 °F (36 °C) (!) 58 18 100 %   09/01/17 0609 138/65 - - - -   08/31/17 2005 135/58 98.7 °F (37.1 °C) 67 18 100 %   08/31/17 1600 127/68 97.6 °F (36.4 °C) 62 18 100 %        Physical Examination:  GENERAL SURVEY: Patient is awake, alert, oriented x 3, sitting comfortably on the chair, not in acute respiratory distress.   HEENT: pale palpebral conjunctivae, anicteric sclerae, no nasoaural discharge, moist oral mucosa  NECK: supple, no jugular venous distention, no palpable lymph nodes  CHEST/LUNGS: symmetrical chest expansion, good air entry, clear breath sounds  HEART: adynamic precordium, good S1 S2, no S3, regular rhythm, no murmurs  ABDOMEN: flat, bowel sounds appreciated, soft, non-tender  EXTREMITIES: (+) left AKA stump with dressing, (+) WoundVac on left medial thigh, (+) ~1 cm x ~1 cm ulcer on right heel, (+) 1.5 cm x 1.5 cm wound above lateral malleolus of right ankle, pale nailbeds, no edema, full and equal pulses, no calf tenderness   NEUROLOGICAL EXAM: The patient is awake, alert and oriented x3, able to answer questions fairly appropriately, able to follow 1 and 2 step commands. Able to tell time from the wall clock. Cranial nerves II-XII are grossly intact. No gross sensory deficit.   Motor strength is 4+/5 on BUE, 4+/5 on the RLE, 3+/5 on the left hip, 4-/5 on the left knee and left ankle.     Incision(s): healing well, clean, dry, and intact       Current Medications:  Current Facility-Administered Medications   Medication Dose Route Frequency    alteplase (CATHFLO) 1 mg in sterile water (preservative free) 1 mL injection  1 mg InterCATHeter PRN    losartan (COZAAR) tablet 100 mg  100 mg Oral DAILY    acetaminophen (TYLENOL) tablet 650 mg  650 mg Oral Q4H PRN    docusate sodium (COLACE) capsule 100 mg  100 mg Oral BID    bisacodyl (DULCOLAX) tablet 10 mg  10 mg Oral Q48H PRN    lactobacillus sp. 50 billion cpu (BIO-K PLUS) capsule 1 Cap  1 Cap Oral DAILY    magnesium oxide (MAG-OX) tablet 400 mg  400 mg Oral DAILY    gabapentin (NEURONTIN) capsule 300 mg  300 mg Oral Q8H    furosemide (LASIX) tablet 20 mg  20 mg Oral DAILY    HYDROmorphone (DILAUDID) tablet 2-4 mg  2-4 mg Oral Q4H PRN    potassium chloride (KLOR-CON) packet 20 mEq  20 mEq Oral DAILY    polyethylene glycol (MIRALAX) packet 17 g  17 g Oral DAILY    amiodarone (CORDARONE) tablet 200 mg  200 mg Oral DAILY    aspirin chewable tablet 81 mg  81 mg Oral DAILY WITH BREAKFAST    cholecalciferol (VITAMIN D3) tablet 2,000 Units  2,000 Units Oral DAILY    ferrous sulfate tablet 325 mg  1 Tab Oral DAILY WITH BREAKFAST    ascorbic acid (vitamin C) (VITAMIN C) tablet 250 mg  250 mg Oral DAILY WITH BREAKFAST    zinc sulfate (ZINCATE) capsule 220 mg  1 Cap Oral DAILY    atorvastatin (LIPITOR) tablet 40 mg  40 mg Oral QHS    metoprolol tartrate (LOPRESSOR) tablet 25 mg  25 mg Oral Q12H    cyanocobalamin tablet 1,000 mcg  1,000 mcg Oral DAILY    WARFARIN INFORMATION NOTE (COUMADIN)   Other Rx Dosing/Monitoring    ampicillin-sulbactam (UNASYN) 3 g in 0.9% sodium chloride (MBP/ADV) 100 mL MBP  3 g IntraVENous Q6H       Allergies: Allergies   Allergen Reactions    Morphine Other (comments)     Patient gets confused with morphine. Lab/Data Review:  Recent Results (from the past 24 hour(s))   PROTHROMBIN TIME + INR    Collection Time: 09/01/17  6:40 AM   Result Value Ref Range    Prothrombin time 25.9 (H) 11.5 - 15.2 sec    INR 2.5 (H) 0.8 - 1.2         Estimated Glomerular Filtration Rate:  On admission, estimated GFR based on a Creatinine of 0.62 mg/dl:   Using CKD-EPI = 108.6 mL/min/1.73m2   Using MDRD = 141.1 mL/min/1.73m2  Most recent estimated GFR, based on a Creatinine of 0.60 mg/dl on 8/28/2017:   Using CKD-EPI = 110.1 mL/min/1.73m2   Using MDRD = 146.6 mL/min/1.73m2       Assessment:     Primary Rehabilitation Diagnosis  1. Impaired Mobility and ADLs  2. S/P Left above-the-knee amputation (8/18/2017 - Dr. Kody Daugherty)  3.  History of ischemic rest pain of the left lower extremity due to severe peripheral vascular disease      Comorbidities   Benign hypertensive heart disease with systolic congestive heart failure, NYHA class 2  I11.0, I50.20    DDD (degenerative disc disease), lumbar M51.36    Erectile dysfunction N52.9    Spinal stenosis of lumbar region with radiculopathy M48.06, M54.16    Hereditary peripheral neuropathy G60.9    Aortoiliac occlusive disease  I74.09    Atherosclerosis of native artery of both lower extremities with intermittent claudication  I70.213    Peripheral artery disease  I73.9    Coronary artery disease involving native coronary artery of native heart I25.10    Paroxysmal atrial fibrillation  I48.0    Acute blood loss as cause of postoperative anemia D62    Anticoagulated on Coumadin Z51.81, Z79.01    Ischemic cardiomyopathy I25.5    Chronic systolic heart failure  Z07.86    Carotid artery disease  I77.9    Dyslipidemia E78.5    Euthyroid sick syndrome E07.81    Aftercare following surgery of the circulatory system Z48.812    Status post femoral-popliteal bypass surgery Z95.828    History of cardioversion Z98.890    Critical ischemia of right lower extremity I99.8    History of vitamin D deficiency Z86.39    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    Infection of vascular bypass graft  T82. 7XXA    Chronic anemia D64.9    Chronic ulcer of right heel (HCC) L97.419    Prolonged Q-T interval on ECG R94.31    Aftercare for amputation stump Z47.81    Moderate to severe pulmonary hypertension  I27.2    Adverse effect of amiodarone T46.2X5A    Skin ulcer of malleolar area of right ankle  L97.319         Plan:     1. Justification for continued stay: Good progression towards established rehabilitation goals. 2. Medical Issues being followed closely:    [x]  Fall and safety precautions     [x]  Wound Care     [x]  Bowel and Bladder Function     [x]  Fluid Electrolyte and Nutrition Balance     [x]  Pain Control      3. Issues that 24 hour rehabilitation nursing is following:    [x]  Fall and safety precautions     [x]  Wound Care     [x]  Bowel and Bladder Function     [x]  Fluid Electrolyte and Nutrition Balance     [x]  Pain Control      [x]  Assistance with and education on in-room safety with transfers to and from the bed, wheelchair, toilet and shower. 4. Acute rehabilitation plan of care:    [x]  Continue current care and rehab. [x]  Physical Therapy           [x]  Occupational Therapy           []  Speech Therapy     []  Hold Rehab until further notice     5. Medications:    [x]  MAR Reviewed     [x]  Continue Present Medications     6. DVT Prophylaxis:      []  Lovenox     []  Unfractionated Heparin     [x]  Coumadin     []  NOAC     []  VASQUEZ Stockings     []  Sequential Compression Device     []  None     7. Orders:   > S/P Left above-the-knee amputation (8/18/2017 - Dr. Starlett Lefort);  History of ischemic rest pain of the left lower extremity due to severe peripheral artery disease   > Wound vac dressing changes by staff nurses every Monday, Wednesday, & Friday on day shift. Measure wound size & document with each dressing change. Settings: 125mmHG low continuous suction. Measure wound size & document with each dressing change. Change canister when 3/4 full. May use saline dressings daily until wound vac initiated.   > Mepilex Border dressings to left AKA staple line q2days & prn soilage. Wrap with ace wrap in figure 8 form.   > Staples to be removed on 9/15/2017    > S/P Removal of infected graft; Repair of left superficial femoral artery; Repair of left common femoral artery (7/25/2017 - Dr. Jayla Mondragon);  History of sepsis associated with infection of vascular bypass graft   > Continue Ampicillin Sulbactam 3 grams IVPB q 6 hr (STOP DATE: 9/19/2017)    > Acute Postoperative Blood Loss Anemia   > Hgb/Hct (8/24/2017, prior to admission to the ARU) = 8.4/25.6   > Anemia work-up (8/22/2017) showed serum iron 15, TIBC 146, iron % saturation 10   > Hgb/Hct (8/26/2017, on admission) = 8.6/26.5   > Reticulocyte count (8/26/2017) = 4.7   > Ferritin (8/26/2017) = 1066   > Hgb/Hct (8/28/2017) = 9.0/27.9    > Hgb/Hct (8/31/2017) = 8.4/26.4   > Continue:    > FeSO4 325 mg PO once daily with breakfast     > Ascorbic Acid 250 mg PO once daily with breakfast (to enhance the absorption of the FeSO4)     > Benign hypertensive heart disease with chronic systolic heart failure   > On 8/26/2017, increased Losartan from 50 mg to 100 mg PO once daily (6AM)   > Continue:    > Furosemide 20 mg PO once daily (6AM)    > Losartan 100 mg PO once daily (6AM)    > Metoprolol tartrate 25 mg PO q 12 hr (9AM, 9PM)    > Paroxysmal atrial fibrillation, presently normal sinus rhythm, anticoagulated on Coumadin   > Continue:    > Amiodarone 200 mg PO once daily    > Metoprolol tartrate 25 mg PO q 12 hr (9AM, 9PM)   > Target INR = 2 to 3   > INR 2.5   > Patient received Coumadin 5 mg PO last night   > Coumadin 3 mg PO tonight    > Chronic ulcer of right heel    > Apply rigid heel suspension boots on both feet when supine in bed   > Podiatry follow-up (Dr. Alferd Apgar) called by Vascular Surgery for comanagement   > Arterial duplex ultrasound (2017) showed patent common femoral to popliteal artery bypass graft without evidence of a hemodynamically significant stenosis. > Continue:    > Ascorbic acid 250 mg PO once daily with breakfast    > Zinc sulfate 220 mg PO once daily    > Skin ulcer of malleolar area of right ankle   > Cleanse wound with saline, apply Aquacel ag dressing, Mepilex Border to right lateral ankle wound every 48hrs & prn soilage. > Analgesia   > Continue:    > Acetaminophen 650 mg PO q 4 hr PRN for pain level less than 5/10    > Hydromorphone 2-4 mg PO q 4 hr PRN for pain level greater than 4/10      8. Patient's progress in rehabilitation and medical issues discussed with the patient. All questions answered to the best of my ability. Care plan discussed with patient and nurse.       Signed:    Hasmukh Apodaca MD    2017

## 2017-09-01 NOTE — INTERDISCIPLINARY ROUNDS
[facility] PHYSICAL REHABILITATION  [address] [place] [address], [address]    INPATIENT REHABILITATION  TEAM CONFERENCE SUMMARY     Date of Conference: 9/1/2017    Name: Marcella Palmer Age / Sex: 59 y.o. / male   CSN: [id number] MRN: [id number]   [facility] Date: 8/25/2017 Length of Stay: 7 days     Primary Rehabilitation Diagnosis  1. Impaired Mobility and ADLs  2. S/P Left above-the-knee amputation (8/18/2017 - Dr. Courtney Alvarez)  3. History of ischemic rest pain of the left lower extremity due to severe peripheral vascular disease      Comorbidities   Benign hypertensive heart disease with systolic congestive heart failure, NYHA class 2  I11.0, I50.20    DDD (degenerative disc disease), lumbar M51.36    Erectile dysfunction N52.9    Spinal stenosis of lumbar region with radiculopathy M48.06, M54.16    Hereditary peripheral neuropathy G60.9    Aortoiliac occlusive disease  I74.09    Atherosclerosis of native artery of both lower extremities with intermittent claudication  I70.213    Peripheral artery disease  I73.9    Coronary artery disease involving native coronary artery of native heart I25.10    Paroxysmal atrial fibrillation  I48.0    Acute blood loss as cause of postoperative anemia D62    Anticoagulated on Coumadin Z51.81, Z79.01    Ischemic cardiomyopathy I25.5    Chronic systolic heart failure  P70.62    Carotid artery disease  I77.9    Dyslipidemia E78.5    Euthyroid sick syndrome E07.81    Aftercare following surgery of the circulatory system Z48.812    Status post femoral-popliteal bypass surgery Z95.828    History of cardioversion Z98.890    Critical ischemia of right lower extremity I99.8    History of vitamin D deficiency Z86.39    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    Infection of vascular bypass graft  T82. 7XXA    Chronic anemia D64.9    Chronic ulcer of right heel (HCC) L97.419    Prolonged Q-T interval on ECG R94.31    Aftercare for amputation stump Z47.81    Moderate to severe pulmonary hypertension  I27.2    Adverse effect of amiodarone T46.2X5A    Skin ulcer of malleolar area of right ankle  L97.319           Therapy:     FIM SCORES Initial Assessment Weekly Progress Assessment 9/1/2017   Eating Functional Level: 6  Comments: pt has dentures, independent with self feeding  6 mod independent   Swallowing     Grooming 7  7 independent   Bathing 4      6 mod independent   Upper Body Dressing Functional Level: 7  Items Applied/Steps Completed: Pullover (4 steps)   6 mod independent   Lower Body Dressing Functional Level: 4  Items Applied/Steps Completed: Elastic waist pants (3 steps), Sock, right (1 step)  Comments: pt donned pants seated on edge of bed, assist to thread wound vac tube through pant leg, CGA to pullup pants  5 -Supervision   Toileting Functional Level: 3  Comments: min assist to pull up clothing due to decreased balance   5 -Supervision   Bladder - level of assist 4 5   Bladder - accident frequency score 6 6   Bowel - level of assist 3 5   Bowel - accident frequency score 4     Toilet Transfer Sullivan Toilet Transfer Score: 4  Comments: min assist stand pivot transfer from w/c to commode with grab bars or RW, assist due to decreased balance and decreased confidence with transfers  4 CGA   Tub/Shower Transfer Sullivan Tub or Shower Type: Tub/Shower combination  Tub/Shower Transfer Score: 4  Comments: min assist stand pivot to tub bench stand pivot transfer from w/c to tu     4-CGA   Comprehension Primary Mode of Comprehension: Auditory  Score: 6 Auditory  6   Expression Primary Mode of Expression: Verbal  Score: 6 Verbal  6   Social Interaction Score: 5 5   Problem Solving Score: 4 5   Memory Score: 4 5     FIM SCORES Initial Assessment Weekly Progress Assessment 9/1/2017   Bed/Chair/Wheelchair Transfers Transfer Type:  Other  Other: Stand-step with RW with CGA/SBA  Transfer Assistance : 4 (Contact guard assistance)  Sit to Stand Assistance: Contact guard assistance  Car Transfers: Not tested  Car Type: n/a Transfer type:  Other  Other: stand hop to RW with S.  Transfer Assistance: 5 (Supervision)  Sit to stand assistance: Supervision  Car transfers: 4 (Contact guard assistance)   Bed Mobility Rolling Right 7 (Independent)   Rolling Left 7 (Independent)   Supine to Sit 7 (Independent)   Sit to Stand Contact guard assistance   Sit to Supine 7 (Independent)    Rolling Right    7   Rolling Left    7   Supine to Sit    7   Sit to Stand    Supervision   Sit to Supine    Supervision      Locomotion (W/C) Able to Propel (ft): 250 feet  Functional Level: 6  Curbs/Ramps Assist Required (FIM Score): 6 (Modified independent)  Wheelchair Setup Assist Required : 6 (Modified independent)  Wheelchair Management: Manages right brake, Manages left brake Able to propel (FT) 250 ft  Functional level 6  Curbs/ramps assist-6  Setup Assistance-6         Locomotion (W/C distance) 250 Feet  250 ft   Locomotion (Walk) 4 (Contact guard assistance)  5 (supervision)      Locomotion (Walk dist.) 40 Feet (ft) (x2 reps)  125 ft x 1   Steps/Stairs Steps/Stairs Ambulated (#):  (3 4 inch)  Level of Assist : 0 (Not tested)  Rail Use: Both  4 inch step x 3 with min/modA and with B HRs         Nursing:     Neuro:   A&O x_4___                   Respiratory:   [x] WNL   [] O2   [] LPM ______   Other:  Peripheral Vascular:   [x] TEDS present   [] Edema present ____ Grade   Cardiac:   [x] WNL   [] Other  Genitourinary:   [x] continent   [] incontinent   [] lopez  Abdominal _______ LBM  GI: _cardiac______ Diet __thin____ Liquids ___no__ tube feeds  Musculoskeletal: _active___ ROM Transfers _wheelchair____ Assistive Device Used  _1 x person assist___ Level of Assistance  Skin Integumentary:   [] Intact   [x] Not Intact   _incision_________Preventative Measures  Details__reposition, dresssing change____________________________________________________________  Pain: [] Controlled   [x] Not Controlled   Pain Meds:   [] Scheduled   [x] PRN        Registered Dietitian / Nutrition:     Patient Vitals for the past 100 hrs:   % Diet Eaten   09/01/17 1335 100 %   08/31/17 1829 100 %   08/31/17 1300 100 %   08/31/17 0930 100 %   08/30/17 1829 85 %   08/30/17 1312 95 %   08/30/17 1015 100 %   08/29/17 1812 100 %   08/29/17 1342 85 %   08/29/17 0935 100 %   08/28/17 1824 85 %   08/28/17 1240 100 %     Pt unavailable at time of visit. Has good/excellent meal intake per chart    Supplements:          [x] Yes: Ensure Enlive BID   [] No      Amount of supplement consumed:  unable to assess at time of visit      Intake/Output Summary (Last 24 hours) at 09/01/17 1354  Last data filed at 09/01/17 1335   Gross per 24 hour   Intake              600 ml   Output             1100 ml   Net             -500 ml                                Last bowel movement:  8/30      Interdisciplinary Team Goals:     1. Goal  Pt to be able to negotiate 3 steps with 2 handrails and with min/CGA. Barrier  Increased pain and numbness of R foot and great toe, decreased strength, decreased endurance and safety awareness. Intervention  Strength, mobility, safety, pain management training. 2. Goal  OT: Pt to massage his residual limb for desensitization. Barrier  wound vac is preventing pt to preform to his full potential      Intervention  Con't with education on residual limb. 3. Goal      Barrier      Intervention       4. Goal  Nursing: patient will have no pain during rehab stay    Barrier  surgery    Intervention  To assess pain every 4 hours and give pain medicine as need. 5. Goal  Address all safety: dependency versus independence    Barrier  Patient's focus on return to independence at expense of safety and forced dependency    Intervention  Patient education and support      6. Goal  Po intake of meals will meet >75% of patient estimated nutritional needs within the next 7 days.   Outcome:  [x] Met/Ongoing    []  Not Met    [] New/Initial Goal     Barrier  none known     Intervention  modified diet; nutrition supplement       Disposition / Discharge Planning:      Follow-up therapy services:  tbd   DME recommendations: tbd   Estimated discharge date: 9/8/17   Discharge Location: home         Interdisciplinary team rounds were held this AM with the following team members:       Name   Physical Therapist    Audrey Magallon PT, DPT   Occupational Therapist    Nando Wellington, OTR   Speech Therapist       Recreational Therapist    [address], RN   Dietitian    Reza Castañeda RD   Clinical Psychologist    Lamon Rinne, PhD   Physician    Tyrone Cool MD        ARNOLD Brumfield       Signed:  Tyrone Cool MD    September 1, 2017

## 2017-09-01 NOTE — PROGRESS NOTES
Problem: Falls - Risk of  Goal: *Absence of Falls  Document Antonio Fall Risk and appropriate interventions in the flowsheet.    Outcome: Progressing Towards Goal  Fall Risk Interventions:  Mobility Interventions: Bed/chair exit alarm     Mentation Interventions: Bed/chair exit alarm     Medication Interventions: Evaluate medications/consider consulting pharmacy     Elimination Interventions: Bed/chair exit alarm     History of Falls Interventions: Bed/chair exit alarm, Room close to nurse's station

## 2017-09-02 LAB
INR PPP: 2.7 (ref 0.8–1.2)
PROTHROMBIN TIME: 27.9 SEC (ref 11.5–15.2)

## 2017-09-02 PROCEDURE — 65310000000 HC RM PRIVATE REHAB

## 2017-09-02 PROCEDURE — 85610 PROTHROMBIN TIME: CPT | Performed by: INTERNAL MEDICINE

## 2017-09-02 PROCEDURE — 74011000258 HC RX REV CODE- 258: Performed by: INTERNAL MEDICINE

## 2017-09-02 PROCEDURE — 77030012935 HC DRSG AQUACEL BMS -B

## 2017-09-02 PROCEDURE — 74011250636 HC RX REV CODE- 250/636: Performed by: INTERNAL MEDICINE

## 2017-09-02 PROCEDURE — 74011250637 HC RX REV CODE- 250/637: Performed by: FAMILY MEDICINE

## 2017-09-02 PROCEDURE — 77030011256 HC DRSG MEPILEX <16IN NO BORD MOLN -A

## 2017-09-02 PROCEDURE — 74011250637 HC RX REV CODE- 250/637: Performed by: INTERNAL MEDICINE

## 2017-09-02 RX ADMIN — DOCUSATE SODIUM 100 MG: 100 CAPSULE, LIQUID FILLED ORAL at 08:29

## 2017-09-02 RX ADMIN — Medication 400 MG: at 08:29

## 2017-09-02 RX ADMIN — GABAPENTIN 300 MG: 300 CAPSULE ORAL at 21:36

## 2017-09-02 RX ADMIN — ATORVASTATIN CALCIUM 40 MG: 40 TABLET, FILM COATED ORAL at 20:04

## 2017-09-02 RX ADMIN — Medication 250 MG: at 08:29

## 2017-09-02 RX ADMIN — Medication 1 CAPSULE: at 08:29

## 2017-09-02 RX ADMIN — HYDROMORPHONE HYDROCHLORIDE 4 MG: 2 TABLET ORAL at 18:05

## 2017-09-02 RX ADMIN — VITAMIN D, TAB 1000IU (100/BT) 2000 UNITS: 25 TAB at 08:29

## 2017-09-02 RX ADMIN — POLYETHYLENE GLYCOL 3350 17 G: 17 POWDER, FOR SOLUTION ORAL at 18:15

## 2017-09-02 RX ADMIN — AMPICILLIN AND SULBACTAM 3 G: 1; 2 INJECTION, POWDER, FOR SOLUTION INTRAMUSCULAR; INTRAVENOUS at 03:22

## 2017-09-02 RX ADMIN — METOPROLOL TARTRATE 25 MG: 25 TABLET, FILM COATED ORAL at 08:29

## 2017-09-02 RX ADMIN — ASPIRIN 81 MG 81 MG: 81 TABLET ORAL at 08:28

## 2017-09-02 RX ADMIN — FUROSEMIDE 20 MG: 20 TABLET ORAL at 08:29

## 2017-09-02 RX ADMIN — AMPICILLIN AND SULBACTAM 3 G: 1; 2 INJECTION, POWDER, FOR SOLUTION INTRAMUSCULAR; INTRAVENOUS at 08:27

## 2017-09-02 RX ADMIN — AMPICILLIN AND SULBACTAM 3 G: 1; 2 INJECTION, POWDER, FOR SOLUTION INTRAMUSCULAR; INTRAVENOUS at 20:03

## 2017-09-02 RX ADMIN — AMIODARONE HYDROCHLORIDE 200 MG: 200 TABLET ORAL at 08:29

## 2017-09-02 RX ADMIN — GABAPENTIN 300 MG: 300 CAPSULE ORAL at 05:49

## 2017-09-02 RX ADMIN — ZINC SULFATE 220 MG (50 MG) CAPSULE 220 MG: CAPSULE at 08:29

## 2017-09-02 RX ADMIN — AMPICILLIN AND SULBACTAM 3 G: 1; 2 INJECTION, POWDER, FOR SOLUTION INTRAMUSCULAR; INTRAVENOUS at 14:11

## 2017-09-02 RX ADMIN — GABAPENTIN 300 MG: 300 CAPSULE ORAL at 14:11

## 2017-09-02 RX ADMIN — METOPROLOL TARTRATE 25 MG: 25 TABLET, FILM COATED ORAL at 20:04

## 2017-09-02 RX ADMIN — CYANOCOBALAMIN TAB 1000 MCG 1000 MCG: 1000 TAB at 08:29

## 2017-09-02 RX ADMIN — POTASSIUM CHLORIDE 20 MEQ: 1.5 POWDER, FOR SOLUTION ORAL at 08:29

## 2017-09-02 RX ADMIN — LOSARTAN POTASSIUM 100 MG: 50 TABLET ORAL at 05:49

## 2017-09-02 RX ADMIN — Medication 325 MG: at 08:29

## 2017-09-02 RX ADMIN — HYDROMORPHONE HYDROCHLORIDE 4 MG: 2 TABLET ORAL at 11:23

## 2017-09-02 RX ADMIN — HYDROMORPHONE HYDROCHLORIDE 4 MG: 2 TABLET ORAL at 02:02

## 2017-09-02 RX ADMIN — WARFARIN SODIUM 2.5 MG: 2 TABLET ORAL at 17:59

## 2017-09-02 RX ADMIN — HYDROMORPHONE HYDROCHLORIDE 4 MG: 2 TABLET ORAL at 23:00

## 2017-09-02 RX ADMIN — DOCUSATE SODIUM 100 MG: 100 CAPSULE, LIQUID FILLED ORAL at 17:59

## 2017-09-02 NOTE — PROGRESS NOTES
Progress Note    Patient: Kamila Gonzales MRN: [id number]  CSN: [id number]    YOB: 1960  Age: 59 y.o. Sex: male    DOA: 8/25/2017 LOS:  LOS: 8 days                    Subjective:     Primary Rehab Diagnosis: Impaired Mobility and ADLs secondary to:  1. S/P Left above-the-knee amputation (8/18/2017 - Dr. Jayla Mondragon)  3. History of ischemic rest pain of the left lower extremity due to severe peripheral vascular disease       Review of systems  General: No fevers or chills. Cardiovascular: No chest pain or pressure. No palpitations. Pulmonary: No shortness of breath, cough or wheeze. Gastrointestinal: No abdominal pain, nausea, vomiting or diarrhea. Genitourinary: No urinary frequency, urgency, hesitancy or dysuria. Musculoskeletal: wound VAC Lt AKA stump, pain Rt foot sup Rt ankle ulcer . Neurologic: No headache,generalized weakness    Objective:     Physical Exam:  Visit Vitals    /62    Pulse 100    Temp 96.7 °F (35.9 °C)    Resp 18    Ht 5' 10\" (1.778 m)    Wt 70.8 kg (156 lb 1.4 oz)    SpO2 91%    BMI 22.4 kg/m2        General:         Alert, cooperative, no acute distress    HEENT: NC, Atraumatic. PERRLA, anicteric sclerae. Lungs: CTA Bilaterally. No Wheezing/Rhonchi/Rales. Heart:  Regular  rhythm,  No murmur, No Rubs, No Gallops  Abdomen: Soft, Non distended, Non tender.  +Bowel sounds, no HSM  Extremities: Lt AKA amputation wound VACC in place  Psych:    Not anxious or agitated. Neurologic:  CN 2-12 grossly intact, Alert and oriented X 3. No acute neurological                                 Deficits,     Intake and Output:  Current Shift:  09/02 0701 - 09/02 1900  In: 440 [P.O.:440]  Out: 600 [Urine:600]  Last three shifts:  08/31 1901 - 09/02 0700  In: 540 [P.O.:540]  Out: 2550 [Urine:2550]    Labs: Results:       Chemistry No results for input(s): GLU, NA, K, CL, CO2, BUN, CREA, CA, AGAP, BUCR, TBIL, GPT, AP, TP, ALB, GLOB, AGRAT in the last 72 hours. CBC w/Diff Recent Labs      08/31/17   0500   HGB  8.4*   HCT  26.4*   PLT  790*      Cardiac Enzymes No results for input(s): CPK, CKND1, LEO in the last 72 hours. No lab exists for component: CKRMB, TROIP   Coagulation Recent Labs      09/02/17   0629  09/01/17   0640   PTP  27.9*  25.9*   INR  2.7*  2.5*       Lipid Panel Lab Results   Component Value Date/Time    Cholesterol, total 157 12/29/2015 07:18 AM    HDL Cholesterol 28 12/29/2015 07:18 AM    LDL, calculated 112.4 12/29/2015 07:18 AM    VLDL, calculated 16.6 12/29/2015 07:18 AM    Triglyceride 83 12/29/2015 07:18 AM    CHOL/HDL Ratio 5.6 12/29/2015 07:18 AM      BNP No results for input(s): BNPP in the last 72 hours. Liver Enzymes No results for input(s): TP, ALB, TBIL, AP, SGOT, GPT in the last 72 hours.     No lab exists for component: DBIL   Thyroid Studies Lab Results   Component Value Date/Time    TSH 6.74 08/02/2017 05:41 AM          Procedures/imaging: see electronic medical records for all procedures/Xrays and details which were not copied into this note but were reviewed prior to creation of Plan    Medications:   Current Facility-Administered Medications   Medication Dose Route Frequency    alteplase (CATHFLO) 1 mg in sterile water (preservative free) 1 mL injection  1 mg InterCATHeter PRN    losartan (COZAAR) tablet 100 mg  100 mg Oral DAILY    acetaminophen (TYLENOL) tablet 650 mg  650 mg Oral Q4H PRN    docusate sodium (COLACE) capsule 100 mg  100 mg Oral BID    bisacodyl (DULCOLAX) tablet 10 mg  10 mg Oral Q48H PRN    lactobacillus sp. 50 billion cpu (BIO-K PLUS) capsule 1 Cap  1 Cap Oral DAILY    magnesium oxide (MAG-OX) tablet 400 mg  400 mg Oral DAILY    gabapentin (NEURONTIN) capsule 300 mg  300 mg Oral Q8H    furosemide (LASIX) tablet 20 mg  20 mg Oral DAILY    HYDROmorphone (DILAUDID) tablet 2-4 mg  2-4 mg Oral Q4H PRN    potassium chloride (KLOR-CON) packet 20 mEq  20 mEq Oral DAILY    polyethylene glycol (MIRALAX) packet 17 g  17 g Oral DAILY    amiodarone (CORDARONE) tablet 200 mg  200 mg Oral DAILY    aspirin chewable tablet 81 mg  81 mg Oral DAILY WITH BREAKFAST    cholecalciferol (VITAMIN D3) tablet 2,000 Units  2,000 Units Oral DAILY    ferrous sulfate tablet 325 mg  1 Tab Oral DAILY WITH BREAKFAST    ascorbic acid (vitamin C) (VITAMIN C) tablet 250 mg  250 mg Oral DAILY WITH BREAKFAST    zinc sulfate (ZINCATE) capsule 220 mg  1 Cap Oral DAILY    atorvastatin (LIPITOR) tablet 40 mg  40 mg Oral QHS    metoprolol tartrate (LOPRESSOR) tablet 25 mg  25 mg Oral Q12H    cyanocobalamin tablet 1,000 mcg  1,000 mcg Oral DAILY    WARFARIN INFORMATION NOTE (COUMADIN)   Other Rx Dosing/Monitoring    ampicillin-sulbactam (UNASYN) 3 g in 0.9% sodium chloride (MBP/ADV) 100 mL MBP  3 g IntraVENous Q6H       Assessment/Plan     Principal Problem:    Status post above knee amputation of left lower extremity (HCC) (8/18/2017)      Overview: S/P Left above-the-knee amputation (8/18/2017 - Dr. Clarence Man)          Active Problems:    Benign hypertensive heart disease with systolic congestive heart failure, NYHA class 2 ([facility] (1/26/2015)      Peripheral artery disease ([facility] (1/25/2017)      Paroxysmal atrial fibrillation (HCC) ()      Acute blood loss as cause of postoperative anemia (7/25/2017)      Impaired mobility and ADLs (7/24/2017)      Anticoagulated on Coumadin ()      Aftercare following surgery of the circulatory system (7/25/2017)      Overview: S/P Removal of infected graft; Repair of left superficial femoral artery;       Repair of left common femoral artery (7/25/2017 - Dr. Zoë Reed); S/P Right axillary femoral jump bypass interposition; Removal of       infected right axillary femoral bypass;  Wound VAC placement of upper thigh       1.2 cm x 5.2 cm x 1.8 cm and groin 4 cm x 0.5 cm x 0.2 cm (8/18/2017 - Dr. Clarence Man)      Infection of vascular bypass graft ([facility] (7/24/2017)      Overview: Left lower extremity      Chronic ulcer of right heel ([address] (8/8/2017)      Ischemic rest pain of lower extremity ([address] (8/14/2017)      Overview: Left      Aftercare for amputation stump (8/18/2017)      Overview: S/P Left above-the-knee amputation (8/18/2017 - Dr. Courtney Alvarez)      Skin ulcer of malleolar area of right ankle ([address] ()    plan  Continue coumadin 2.5 mg f/u pt and INR tomorrow  F/u podiatry Dr Radha Peterson  Amiodarone 200 mg daily  Lipitor 40 mg qhs  Neurontin 300 mg q8h  Lasix 20 mg monitor kidney function  Monitor Bp on Cozaar and metoprolol  Cbc, cmp, mag    Arturo Evans MD  9/2/2017 6:44 PM

## 2017-09-02 NOTE — ROUTINE PROCESS
SHIFT CHANGE NOTE FOR [facility]    Bedside and Verbal shift change report given to Nohemy Lorenzo (oncoming nurse) by Phillip Meadows, RN   (offgoing nurse). Report included the following information SBAR, Kardex, MAR and Recent Results. Situation:   Code Status: Full Code   Reason for Admission: [facility]  Hospital Day: 8   Problem List:   Hospital Problems  Date Reviewed: 9/1/2017          Codes Class Noted POA    Skin ulcer of malleolar area of right ankle ([facility] (Chronic) ICD-10-CM: L97.319  ICD-9-CM: 707.13  Unknown Yes        * (Principal)Status post above knee amputation of left lower extremity ([facility] ICD-10-CM: D20.993  ICD-9-CM: V49.76  8/18/2017 Yes    Overview Signed 8/21/2017 10:21 PM by Darvin Osler, MD     S/P Left above-the-knee amputation (8/18/2017 - Dr. Janel Mckeon)               Aftercare for amputation stump ICD-10-CM: Z47.81  ICD-9-CM: V54.89  8/18/2017 Yes    Overview Signed 8/21/2017 10:25 PM by Darvin Osler, MD     S/P Left above-the-knee amputation (8/18/2017 - Dr. Janel Mckeon)             Ischemic rest pain of lower extremity ([facility] ICD-10-CM: I73.9  ICD-9-CM: 443.9  8/14/2017 Yes    Overview Signed 8/15/2017 11:09 AM by Darvin Osler, MD     Left             Chronic ulcer of right heel ([facility] (Chronic) ICD-10-CM: L97.419  ICD-9-CM: 707.14  8/8/2017 Yes        Acute blood loss as cause of postoperative anemia ICD-10-CM: D62  ICD-9-CM: 285.1  7/25/2017 Yes        Aftercare following surgery of the circulatory system ICD-10-CM: Z48.812  ICD-9-CM: V58.73  7/25/2017 Yes    Overview Addendum 8/21/2017 10:22 PM by Darvin Osler, MD     S/P Removal of infected graft; Repair of left superficial femoral artery; Repair of left common femoral artery (7/25/2017 - Dr. Janel Mckeon); S/P Right axillary femoral jump bypass interposition; Removal of infected right axillary femoral bypass;  Wound VAC placement of upper thigh 1.2 cm x 5.2 cm x 1.8 cm and groin 4 cm x 0.5 cm x 0.2 cm (8/18/2017 - Dr. Tres Jang)             Impaired mobility and ADLs ICD-10-CM: Z74.09  ICD-9-CM: 799.89  7/24/2017 Yes        Infection of vascular bypass graft [facility]) ICD-10-CM: T82. 7XXA  ICD-9-CM: 996.62  7/24/2017 Yes    Overview Signed 8/7/2017  2:19 PM by Pearlean Felty, MD     Left lower extremity             Anticoagulated on Coumadin (Chronic) ICD-10-CM: Z51.81, Z79.01  ICD-9-CM: V58.83, V58.61  Unknown Yes        Paroxysmal atrial fibrillation (HCC) ICD-10-CM: I48.0  ICD-9-CM: 427.31  Unknown Yes        Peripheral artery disease ([facility] (Chronic) ICD-10-CM: I73.9  ICD-9-CM: 443.9  1/25/2017 Yes        Benign hypertensive heart disease with systolic congestive heart failure, NYHA class 2 (HCC) (Chronic) ICD-10-CM: I11.0, I50.20  ICD-9-CM: 402.11, 428.20, 428.0  1/26/2015 Yes              Background:   Past Medical History:   Past Medical History:   Diagnosis Date    Acute blood loss as cause of postoperative anemia 4/4/2017    Anticoagulated on Coumadin     Aortoiliac occlusive disease ([address] 1/25/2017    Atherosclerosis of native artery of both lower extremities with intermittent claudication ([facility] 1/25/2017    Benign hypertensive heart disease with systolic congestive heart failure, NYHA class 2 ([address] 1/26/2015    Carotid artery disease (HCC)     Chronic anemia     Chronic systolic heart failure (HCC)     Chronic ulcer of right foot ([address] 4/4/2017    Chronic ulcer of right heel ([address] 8/8/2017    Coronary artery disease involving native coronary artery of native heart 3/15/2017    Successful stenting of Cx (Xience LESLEY) and RCA (Xience LESLEY) to 0% by Dr. Zuly Armas on 3/15/17.     DDD (degenerative disc disease), lumbar 1/26/2015    Dyslipidemia     Erectile dysfunction 7/5/2016    Euthyroid sick syndrome 4/6/2017    Hereditary peripheral neuropathy 11/15/2016    History of cardioversion 4/18/2017    S/P Synchronized external cardioversion (4/18/2017 - Dr. Lawanda Peterson)    History of vitamin D deficiency 4/22/2017    Vitamin D 25-Hydroxy (4/22/2017) = 12.0; Vitamin D 25-Hydroxy (5/26/2017) = 38.7    Infection of vascular bypass graft ([facility] 7/24/2017    Left lower extremity    Ischemic cardiomyopathy     Moderate to severe pulmonary hypertension ([facility] 7/23/2017    Paroxysmal atrial fibrillation (HCC)     Peripheral artery disease ([facility] 1/25/2017    Skin ulcer of malleolar area of right ankle ([facility]     Spinal stenosis of lumbar region with radiculopathy 5/4/2015    Dr. Steele Held      Patient taking anticoagulants yes   Patient has a defibrillator: no     Assessment:   Changes in Assessment throughout shift: no     Patient has central line:yes Reasons if yes: long term antibiotic  Insertion date: 08/24/17 Last dressing date:08/31/17   Patient has Jiang Cath: no Reasons if yes:     Insertion date:     Last Vitals:     Vitals:    09/01/17 0809 09/01/17 1653 09/01/17 2137 09/02/17 0547   BP: 145/65 133/61 139/65 137/60   Pulse: (!) 58 65 62 (!) 55   Resp: 18 18     Temp: 96.8 °F (36 °C) 97.4 °F (36.3 °C)     SpO2: 100% 98%     Weight:       Height:            PAIN    Pain Assessment    Pain Intensity 1: 0 (09/02/17 0736) Pain Intensity 1: 2 (12/29/14 1105)    Pain Location 1: Toe (comment which one) (right great toe) Pain Location 1: Abdomen    Pain Intervention(s) 1: Medication (see MAR) Pain Intervention(s) 1: Medication (see MAR)  Patient Stated Pain Goal: 0 Patient Stated Pain Goal: 0  o Intervention effective: no    o Other actions taken for pain: pain medicine     Skin Assessment  Skin color Skin Color: Appropriate for ethnicity  Condition/Temperature Skin Condition/Temp: Warm  Integrity Skin Integrity: Incision (comment)  Turgor Turgor: Non-tenting  Weekly Pressure Ulcer Documentation  Pressure  Injury Documentation: No Pressure Injury Noted-Pressure Ulcer Prevention Initiated  Wound Prevention & Protection Methods  Orientation of wound Orientation of Wound Prevention: Posterior  Location of Prevention Location of Wound Prevention: Buttocks, Sacrum/Coccyx  Dressing Present Dressing Present : No  Dressing Status Dressing Status: Intact  Wound Offloading Wound Offloading (Prevention Methods): Bed, pressure redistribution/air     INTAKE/OUPUT    Date 09/01/17 0700 - 09/02/17 0659 09/02/17 0700 - 09/03/17 0659   Shift 0700-1859 1900-0659 24 Hour Total 0700-1859 1900-0659 24 Hour Total   I  N  T  A  K  E   P. O. 540  540         P. O. 540  540       Shift Total  (mL/kg) 540  (7.6)  540  (7.6)      O  U  T  P  U  T   Urine  (mL/kg/hr) 550  (0.6) 2000  (2.4) 2550  (1.5)         Urine Voided 550 2000 2550         Urine Occurrence(s) 3 x 4 x 7 x       Stool            Stool Occurrence(s) 1 x 0 x 1 x       Shift Total  (mL/kg) 550  (7.8) 2000  (28.2) 2550  (36)      NET -10 -2000 -2010      Weight (kg) 70.8 70.8 70.8 70.8 70.8 70.8       Recommendations:  1. Patient needs and requests: pain medicine    2. Diet: cardiac    3. Pending tests/procedures: no     4. Functional Level/Equipment: 1 x person assist    5. Estimated Discharge Date: TDB Posted on Whiteboard in Patients Room: no     [facility] Safety Check    A safety check occurred in the patient's room between off going nurse and oncoming nurse listed above.     The safety check included the below items  Area Items   H  High Alert Medications - Verify all high alert medication drips (heparin, PCA, etc.)   E  Equipment - Suction is set up for ALL patients (with yanker)  - Red plugs utilized for all equipment (IV pumps, etc.)  - WOWs wiped down at end of shift.  - Room stocked with oxygen, suction, and other unit-specific supplies   A  Alarms - Bed alarm is set for fall risk patients  - Ensure chair alarm is in place and activated if patient is up in a chair   L  Lines - Check IV for any infiltration  - Jiang bag is empty if patient has a Jiang   - Tubing and IV bags are labeled   S  Safety   - Room is clean, patient is clean, and equipment is clean. - Hallways are clear from equipment besides carts. - Fall bracelet on for fall risk patients  - Ensure room is clear and free of clutter  - Suction is set up for ALL patients (with cailin)  - Hallways are clear from equipment besides carts.    - Isolation precautions followed, supplies available outside room, sign posted

## 2017-09-02 NOTE — PROGRESS NOTES
Pt seen and examined. Wounds are healing nicely and I am not sure wound VAC is still needed on left thigh. Reviewed duplex from earlier this week and graft is patent. Has severe tibial disease. Will evaluate heel wond tomorrow.

## 2017-09-02 NOTE — ROUTINE PROCESS
SHIFT CHANGE NOTE FOR [facility] [place]    Bedside and Verbal shift change report given to Fanny Santos, RN (oncoming nurse) by Jesus Dey RN   (offgoing nurse). Report included the following information SBAR, Kardex, MAR and Recent Results. Situation:   Code Status: Full Code   Reason for Admission: left AKA  Hospital Day: 8   Problem List:   Hospital Problems  Date Reviewed: 9/1/2017          Codes Class Noted POA    Skin ulcer of malleolar area of right ankle ([facility] (Chronic) ICD-10-CM: L97.319  ICD-9-CM: 707.13  Unknown Yes        * (Principal)Status post above knee amputation of left lower extremity ([facility] [address] ICD-10-CM: Y16.023  ICD-9-CM: V49.76  8/18/2017 Yes    Overview Signed 8/21/2017 10:21 PM by Juwan Mckeon MD     S/P Left above-the-knee amputation (8/18/2017 - Dr. Brandt Jennings)               Aftercare for amputation stump ICD-10-CM: Z47.81  ICD-9-CM: V54.89  8/18/2017 Yes    Overview Signed 8/21/2017 10:25 PM by Juwan Mckeon MD     S/P Left above-the-knee amputation (8/18/2017 - Dr. Brandt Jennings)             Ischemic rest pain of lower extremity ([facility] ICD-10-CM: I73.9  ICD-9-CM: 443.9  8/14/2017 Yes    Overview Signed 8/15/2017 11:09 AM by Juwan Mckeon MD     Left             Chronic ulcer of right heel ([facility] (Chronic) ICD-10-CM: L97.419  ICD-9-CM: 707.14  8/8/2017 Yes        Acute blood loss as cause of postoperative anemia ICD-10-CM: D62  ICD-9-CM: 285.1  7/25/2017 Yes        Aftercare following surgery of the circulatory system ICD-10-CM: Z48.812  ICD-9-CM: V58.73  7/25/2017 Yes    Overview Addendum 8/21/2017 10:22 PM by Juwan Mckeon MD     S/P Removal of infected graft; Repair of left superficial femoral artery; Repair of left common femoral artery (7/25/2017 - Dr. Brandt Jennings); S/P Right axillary femoral jump bypass interposition; Removal of infected right axillary femoral bypass;  Wound VAC placement of upper thigh 1.2 cm x 5.2 cm x 1.8 cm and groin 4 cm x 0.5 cm x 0.2 cm (8/18/2017 - Dr. Tracy [place])             Impaired mobility and ADLs ICD-10-CM: Z74.09  ICD-9-CM: 799.89  7/24/2017 Yes        Infection of vascular bypass graft [facility]) ICD-10-CM: T82. 7XXA  ICD-9-CM: 996.62  7/24/2017 Yes    Overview Signed 8/7/2017  2:19 PM by Dylan Cintron MD     Left lower extremity             Anticoagulated on Coumadin (Chronic) ICD-10-CM: Z51.81, Z79.01  ICD-9-CM: V58.83, V58.61  Unknown Yes        Paroxysmal atrial fibrillation (HCC) ICD-10-CM: I48.0  ICD-9-CM: 427.31  Unknown Yes        Peripheral artery disease ([facility] (Chronic) ICD-10-CM: I73.9  ICD-9-CM: 443.9  1/25/2017 Yes        Benign hypertensive heart disease with systolic congestive heart failure, NYHA class 2 (HCC) (Chronic) ICD-10-CM: I11.0, I50.20  ICD-9-CM: 402.11, 428.20, 428.0  1/26/2015 Yes              Background:   Past Medical History:   Past Medical History:   Diagnosis Date    Acute blood loss as cause of postoperative anemia 4/4/2017    Anticoagulated on Coumadin     Aortoiliac occlusive disease ([address] 1/25/2017    Atherosclerosis of native artery of both lower extremities with intermittent claudication ([address] 1/25/2017    Benign hypertensive heart disease with systolic congestive heart failure, NYHA class 2 ([address] 1/26/2015    Carotid artery disease (HCC)     Chronic anemia     Chronic systolic heart failure (HCC)     Chronic ulcer of right foot ([address] 4/4/2017    Chronic ulcer of right heel ([address] 8/8/2017    Coronary artery disease involving native coronary artery of native heart 3/15/2017    Successful stenting of Cx (Xience LESLEY) and RCA (Xience LESLEY) to 0% by Dr. Mikhail Richards on 3/15/17.     DDD (degenerative disc disease), lumbar 1/26/2015    Dyslipidemia     Erectile dysfunction 7/5/2016    Euthyroid sick syndrome 4/6/2017    Hereditary peripheral neuropathy 11/15/2016    History of cardioversion 4/18/2017    S/P Synchronized external cardioversion (4/18/2017 - Dr. Amy Vigil)    History of vitamin D deficiency 4/22/2017    Vitamin D 25-Hydroxy (4/22/2017) = 12.0; Vitamin D 25-Hydroxy (5/26/2017) = 38.7    Infection of vascular bypass graft ([facility] 7/24/2017    Left lower extremity    Ischemic cardiomyopathy     Moderate to severe pulmonary hypertension ([facility] 7/23/2017    Paroxysmal atrial fibrillation (HCC)     Peripheral artery disease ([facility] 1/25/2017    Skin ulcer of malleolar area of right ankle ([facility]     Spinal stenosis of lumbar region with radiculopathy 5/4/2015    Dr. Shruthi Madison      Patient taking anticoagulants no    Patient has a defibrillator: no     Assessment:   Changes in Assessment throughout shift: no     Patient has central line: no Reasons if yes: long term antibiotic  Insertion date:8/24/17 Last dressing date:8/31/17   Patient has Jiang Cath: no Reasons if yes:     Insertion date:     Last Vitals:     Vitals:    09/01/17 2137 09/02/17 0547 09/02/17 1043 09/02/17 1632   BP: 139/65 137/60 140/70 134/62   Pulse: 62 (!) 55 (!) 59 100   Resp:   18 18   Temp:   96.8 °F (36 °C) 96.7 °F (35.9 °C)   SpO2:   100% 91%   Weight:       Height:            PAIN    Pain Assessment    Pain Intensity 1: 3 (09/02/17 1600) Pain Intensity 1: 2 (12/29/14 1105)    Pain Location 1: Foot Pain Location 1: Abdomen    Pain Intervention(s) 1: Medication (see MAR) Pain Intervention(s) 1: Medication (see MAR)  Patient Stated Pain Goal: 3 Patient Stated Pain Goal: 0  o Intervention effective: no    o Other actions taken for pain: pain medicine     Skin Assessment  Skin color Skin Color: Appropriate for ethnicity  Condition/Temperature Skin Condition/Temp: Warm  Integrity Skin Integrity: Incision (comment)  Turgor Turgor: Non-tenting  Weekly Pressure Ulcer Documentation  Pressure  Injury Documentation: No Pressure Injury Noted-Pressure Ulcer Prevention Initiated  Wound Prevention & Protection Methods  Orientation of wound Orientation of Wound Prevention: Posterior  Location of Prevention Location of Wound Prevention: Sacrum/Coccyx  Dressing Present Dressing Present : No  Dressing Status Dressing Status: Intact  Wound Offloading Wound Offloading (Prevention Methods): Bed, pressure redistribution/air, Chair cushion, Wheelchair     INTAKE/OUPUT    Date 09/01/17 1900 - 09/02/17 0659 09/02/17 0700 - 09/03/17 0659   Shift 2349-9836 24 Hour Total 8333-4947 2929-9649 24 Hour Total   I  N  T  A  K  E   P. O.  540 680  680      P. O.  540 680  680    Shift Total  (mL/kg)  540  (7.6) 680  (9.6)  680  (9.6)   O  U  T  P  U  T   Urine  (mL/kg/hr) 2000 2550 700  (0.8)  700      Urine Voided 2000 2550 700  700      Urine Occurrence(s) 4 x 7 x 3 x  3 x    Stool           Stool Occurrence(s) 0 x 1 x 1 x  1 x    Shift Total  (mL/kg) 2000  (28.2) 2550  (36) 700  (9.9)  700  (9.9)   NET -2000 -2010 -20 -20   Weight (kg) 70.8 70.8 70.8 70.8 70.8       Recommendations:  1. Patient needs and requests: pain medicine    2. Diet: cardiac    3. Pending tests/procedures: no     4. Functional Level/Equipment: 1 x person assist    5. Estimated Discharge Date: TDB Posted on Whiteboard in Patients Room: no     [facility] [place] Safety Check    A safety check occurred in the patient's room between off going nurse and oncoming nurse listed above. The safety check included the below items  Area Items   H  High Alert Medications - Verify all high alert medication drips (heparin, PCA, etc.)   E  Equipment - Suction is set up for ALL patients (with yanker)  - Red plugs utilized for all equipment (IV pumps, etc.)  - WOWs wiped down at end of shift.  - Room stocked with oxygen, suction, and other unit-specific supplies   A  Alarms - Bed alarm is set for fall risk patients  - Ensure chair alarm is in place and activated if patient is up in a chair   L  Lines - Check IV for any infiltration  - Jiang bag is empty if patient has a Jiang   - Tubing and IV bags are labeled   S  Safety   - Room is clean, patient is clean, and equipment is clean.   - Hallways are clear from equipment besides carts. - Fall bracelet on for fall risk patients  - Ensure room is clear and free of clutter  - Suction is set up for ALL patients (with cailin)  - Hallways are clear from equipment besides carts.    - Isolation precautions followed, supplies available outside room, sign posted

## 2017-09-03 LAB
ALBUMIN SERPL-MCNC: 2.8 G/DL (ref 3.4–5)
ALBUMIN/GLOB SERPL: 0.5 {RATIO} (ref 0.8–1.7)
ALP SERPL-CCNC: 408 U/L (ref 45–117)
ALT SERPL-CCNC: 27 U/L (ref 16–61)
ANION GAP SERPL CALC-SCNC: 8 MMOL/L (ref 3–18)
AST SERPL-CCNC: 28 U/L (ref 15–37)
BASOPHILS # BLD: 0 K/UL (ref 0–0.06)
BASOPHILS NFR BLD: 0 % (ref 0–3)
BILIRUB SERPL-MCNC: 0.3 MG/DL (ref 0.2–1)
BUN SERPL-MCNC: 21 MG/DL (ref 7–18)
BUN/CREAT SERPL: 19 (ref 12–20)
CALCIUM SERPL-MCNC: 8.8 MG/DL (ref 8.5–10.1)
CHLORIDE SERPL-SCNC: 99 MMOL/L (ref 100–108)
CO2 SERPL-SCNC: 27 MMOL/L (ref 21–32)
CREAT SERPL-MCNC: 1.13 MG/DL (ref 0.6–1.3)
DIFFERENTIAL METHOD BLD: ABNORMAL
EOSINOPHIL # BLD: 0.4 K/UL (ref 0–0.4)
EOSINOPHIL NFR BLD: 4 % (ref 0–5)
ERYTHROCYTE [DISTWIDTH] IN BLOOD BY AUTOMATED COUNT: 18.7 % (ref 11.6–14.5)
GLOBULIN SER CALC-MCNC: 5.1 G/DL (ref 2–4)
GLUCOSE SERPL-MCNC: 81 MG/DL (ref 74–99)
HCT VFR BLD AUTO: 26.4 % (ref 36–48)
HGB BLD-MCNC: 8.6 G/DL (ref 13–16)
INR PPP: 2.9 (ref 0.8–1.2)
LYMPHOCYTES # BLD: 2 K/UL (ref 0.8–3.5)
LYMPHOCYTES NFR BLD: 19 % (ref 20–51)
MAGNESIUM SERPL-MCNC: 2 MG/DL (ref 1.6–2.6)
MCH RBC QN AUTO: 29.2 PG (ref 24–34)
MCHC RBC AUTO-ENTMCNC: 32.6 G/DL (ref 31–37)
MCV RBC AUTO: 89.5 FL (ref 74–97)
MONOCYTES # BLD: 0.6 K/UL (ref 0–1)
MONOCYTES NFR BLD: 6 % (ref 2–9)
NEUTS SEG # BLD: 7.7 K/UL (ref 1.8–8)
NEUTS SEG NFR BLD: 71 % (ref 42–75)
PLATELET # BLD AUTO: 637 K/UL (ref 135–420)
PLATELET COMMENTS,PCOM: ABNORMAL
PMV BLD AUTO: 8.9 FL (ref 9.2–11.8)
POTASSIUM SERPL-SCNC: 3.9 MMOL/L (ref 3.5–5.5)
PROT SERPL-MCNC: 7.9 G/DL (ref 6.4–8.2)
PROTHROMBIN TIME: 29.8 SEC (ref 11.5–15.2)
RBC # BLD AUTO: 2.95 M/UL (ref 4.7–5.5)
RBC MORPH BLD: ABNORMAL
SODIUM SERPL-SCNC: 134 MMOL/L (ref 136–145)
WBC # BLD AUTO: 10.7 K/UL (ref 4.6–13.2)

## 2017-09-03 PROCEDURE — 74011250637 HC RX REV CODE- 250/637: Performed by: FAMILY MEDICINE

## 2017-09-03 PROCEDURE — 65310000000 HC RM PRIVATE REHAB

## 2017-09-03 PROCEDURE — 74011250637 HC RX REV CODE- 250/637: Performed by: INTERNAL MEDICINE

## 2017-09-03 PROCEDURE — 74011000258 HC RX REV CODE- 258: Performed by: INTERNAL MEDICINE

## 2017-09-03 PROCEDURE — 85025 COMPLETE CBC W/AUTO DIFF WBC: CPT | Performed by: INTERNAL MEDICINE

## 2017-09-03 PROCEDURE — 83735 ASSAY OF MAGNESIUM: CPT | Performed by: INTERNAL MEDICINE

## 2017-09-03 PROCEDURE — 80053 COMPREHEN METABOLIC PANEL: CPT | Performed by: INTERNAL MEDICINE

## 2017-09-03 PROCEDURE — 85610 PROTHROMBIN TIME: CPT | Performed by: INTERNAL MEDICINE

## 2017-09-03 PROCEDURE — 74011250636 HC RX REV CODE- 250/636: Performed by: INTERNAL MEDICINE

## 2017-09-03 RX ORDER — WARFARIN 1 MG/1
1 TABLET ORAL ONCE
Status: COMPLETED | OUTPATIENT
Start: 2017-09-03 | End: 2017-09-03

## 2017-09-03 RX ADMIN — WARFARIN SODIUM 1 MG: 1 TABLET ORAL at 18:06

## 2017-09-03 RX ADMIN — DOCUSATE SODIUM 100 MG: 100 CAPSULE, LIQUID FILLED ORAL at 08:24

## 2017-09-03 RX ADMIN — DOCUSATE SODIUM 100 MG: 100 CAPSULE, LIQUID FILLED ORAL at 18:06

## 2017-09-03 RX ADMIN — HYDROMORPHONE HYDROCHLORIDE 4 MG: 2 TABLET ORAL at 18:27

## 2017-09-03 RX ADMIN — AMPICILLIN AND SULBACTAM 3 G: 1; 2 INJECTION, POWDER, FOR SOLUTION INTRAMUSCULAR; INTRAVENOUS at 21:17

## 2017-09-03 RX ADMIN — METOPROLOL TARTRATE 25 MG: 25 TABLET, FILM COATED ORAL at 08:24

## 2017-09-03 RX ADMIN — LOSARTAN POTASSIUM 100 MG: 50 TABLET ORAL at 06:58

## 2017-09-03 RX ADMIN — AMIODARONE HYDROCHLORIDE 200 MG: 200 TABLET ORAL at 08:24

## 2017-09-03 RX ADMIN — ATORVASTATIN CALCIUM 40 MG: 40 TABLET, FILM COATED ORAL at 21:19

## 2017-09-03 RX ADMIN — AMPICILLIN AND SULBACTAM 3 G: 1; 2 INJECTION, POWDER, FOR SOLUTION INTRAMUSCULAR; INTRAVENOUS at 14:13

## 2017-09-03 RX ADMIN — ZINC SULFATE 220 MG (50 MG) CAPSULE 220 MG: CAPSULE at 08:24

## 2017-09-03 RX ADMIN — VITAMIN D, TAB 1000IU (100/BT) 2000 UNITS: 25 TAB at 08:23

## 2017-09-03 RX ADMIN — AMPICILLIN AND SULBACTAM 3 G: 1; 2 INJECTION, POWDER, FOR SOLUTION INTRAMUSCULAR; INTRAVENOUS at 03:05

## 2017-09-03 RX ADMIN — GABAPENTIN 300 MG: 300 CAPSULE ORAL at 21:19

## 2017-09-03 RX ADMIN — Medication 400 MG: at 08:24

## 2017-09-03 RX ADMIN — AMPICILLIN AND SULBACTAM 3 G: 1; 2 INJECTION, POWDER, FOR SOLUTION INTRAMUSCULAR; INTRAVENOUS at 08:24

## 2017-09-03 RX ADMIN — HYDROMORPHONE HYDROCHLORIDE 4 MG: 2 TABLET ORAL at 07:01

## 2017-09-03 RX ADMIN — CYANOCOBALAMIN TAB 1000 MCG 1000 MCG: 1000 TAB at 08:23

## 2017-09-03 RX ADMIN — HYDROMORPHONE HYDROCHLORIDE 4 MG: 2 TABLET ORAL at 14:24

## 2017-09-03 RX ADMIN — Medication 325 MG: at 08:24

## 2017-09-03 RX ADMIN — GABAPENTIN 300 MG: 300 CAPSULE ORAL at 06:58

## 2017-09-03 RX ADMIN — Medication 250 MG: at 08:23

## 2017-09-03 RX ADMIN — GABAPENTIN 300 MG: 300 CAPSULE ORAL at 13:06

## 2017-09-03 RX ADMIN — POTASSIUM CHLORIDE 20 MEQ: 1.5 POWDER, FOR SOLUTION ORAL at 08:23

## 2017-09-03 RX ADMIN — Medication 1 CAPSULE: at 08:23

## 2017-09-03 RX ADMIN — ASPIRIN 81 MG 81 MG: 81 TABLET ORAL at 08:23

## 2017-09-03 RX ADMIN — HYDROMORPHONE HYDROCHLORIDE 4 MG: 2 TABLET ORAL at 03:05

## 2017-09-03 RX ADMIN — POLYETHYLENE GLYCOL 3350 17 G: 17 POWDER, FOR SOLUTION ORAL at 18:06

## 2017-09-03 RX ADMIN — METOPROLOL TARTRATE 25 MG: 25 TABLET, FILM COATED ORAL at 21:18

## 2017-09-03 RX ADMIN — FUROSEMIDE 20 MG: 20 TABLET ORAL at 09:05

## 2017-09-03 NOTE — ROUTINE PROCESS
SHIFT CHANGE NOTE FOR [facility]    Bedside and Verbal shift change report given to Chaka Olvera RN (oncoming nurse) by Marcelina Keenan RN   (offgoing nurse). Report included the following information SBAR, Kardex, MAR and Recent Results. Situation:   Code Status: Full Code   Reason for Admission: left AKA  Hospital Day: 9   Problem List:   Hospital Problems  Date Reviewed: 9/1/2017          Codes Class Noted POA    Skin ulcer of malleolar area of right ankle ([facility] [address] (Chronic) ICD-10-CM: L97.319  ICD-9-CM: 707.13  Unknown Yes        * (Principal)Status post above knee amputation of left lower extremity ([facility] [address] ICD-10-CM: G04.036  ICD-9-CM: V49.76  8/18/2017 Yes    Overview Signed 8/21/2017 10:21 PM by Lionel Price MD     S/P Left above-the-knee amputation (8/18/2017 - Dr. Hilda Carroll)               Aftercare for amputation stump ICD-10-CM: Z47.81  ICD-9-CM: V54.89  8/18/2017 Yes    Overview Signed 8/21/2017 10:25 PM by Lionel Price MD     S/P Left above-the-knee amputation (8/18/2017 - Dr. Hilda Carroll)             Ischemic rest pain of lower extremity ([facility] [address] ICD-10-CM: I73.9  ICD-9-CM: 443.9  8/14/2017 Yes    Overview Signed 8/15/2017 11:09 AM by Lionel Price MD     Left             Ch[facility] (Chronic) ICD-10-CM: L97.419  ICD-9-CM: 707.14  8/8/2017 Yes        Acute blood loss as cause of postoperative anemia ICD-10-CM: D62  ICD-9-CM: 285.1  7/25/2017 Yes        Aftercare following surgery of the circulatory system ICD-10-CM: Z48.812  ICD-9-CM: V58.73  7/25/2017 Yes    Overview Addendum 8/21/2017 10:22 PM by Lionel Price MD     S/P Removal of infected graft; Repair of left superficial femoral artery; Repair of left common femoral artery (7/25/2017 - Dr. Hilda Carroll); S/P Right axillary femoral jump bypass interposition; Removal of infected right axillary femoral bypass;  Wound VAC placement of upper thigh 1.2 cm x 5.2 cm x 1.8 cm and groin 4 cm x 0.5 cm x 0.2 cm (8/18/2017 - Dr. Jovanni Hightower)             Impaired mobility and ADLs ICD-10-CM: Z74.09  ICD-9-CM: 799.89  7/24/2017 Yes        Infection of vascular bypass graft [facility]) ICD-10-CM: T82. 7XXA  ICD-9-CM: 996.62  7/24/2017 Yes    Overview Signed 8/7/2017  2:19 PM by Helga Robles MD     Left lower extremity             Anticoagulated on Coumadin (Chronic) ICD-10-CM: Z51.81, Z79.01  ICD-9-CM: V58.83, V58.61  Unknown Yes        Paroxysmal atrial fibrillation (HCC) ICD-10-CM: I48.0  ICD-9-CM: 427.31  Unknown Yes        Peripheral artery disease ([facility] [address] (Chronic) ICD-10-CM: I73.9  ICD-9-CM: 443.9  1/25/2017 Yes        Benign hypertensive heart disease with systolic congestive heart failure, NYHA class 2 (HCC) (Chronic) ICD-10-CM: I11.0, I50.20  ICD-9-CM: 402.11, 428.20, 428.0  1/26/2015 Yes              Background:   Past Medical History:   Past Medical History:   Diagnosis Date    Acute blood loss as cause of postoperative anemia 4/4/2017    Anticoagulated on Coumadin     Aortoiliac occlusive disease ([address] 1/25/2017    Atherosclerosis of native artery of both lower extremities with intermittent claudication ([address] 1/25/2017    Benign hypertensive heart disease with systolic congestive heart failure, NYHA class 2 ([address] 1/26/2015    Carotid artery disease (HCC)     Chronic anemia     Chronic systolic heart failure (HCC)     Chronic ulcer of right foot ([address] 4/4/2017    Chronic ulcer of right heel ([address] 8/8/2017    Coronary artery disease involving native coronary artery of native heart 3/15/2017    Successful stenting of Cx (Xience LESLEY) and RCA (Xience LESLEY) to 0% by Dr. Dulce Reyes on 3/15/17.     DDD (degenerative disc disease), lumbar 1/26/2015    Dyslipidemia     Erectile dysfunction 7/5/2016    Euthyroid sick syndrome 4/6/2017    Hereditary peripheral neuropathy 11/15/2016    History of cardioversion 4/18/2017    S/P Synchronized external cardioversion (4/18/2017 - Dr. Sridhar Romero)    History of vitamin D deficiency 4/22/2017    Vitamin D 25-Hydroxy (4/22/2017) = 12.0; Vitamin D 25-Hydroxy (5/26/2017) = 38.7    Infection of vascular bypass graft ([facility] 7/24/2017    Left lower extremity    Ischemic cardiomyopathy     Moderate to severe pulmonary hypertension ([facility] 7/23/2017    Paroxysmal atrial fibrillation (HCC)     Peripheral artery disease ([facility] 1/25/2017    Skin ulcer of malleolar area of right ankle ([facility]     Spinal stenosis of lumbar region with radiculopathy 5/4/2015    Dr. Lotus Briseno      Patient taking anticoagulants yes   Patient has a defibrillator: no     Assessment:   Changes in Assessment throughout shift: no     Patient has central line: yes Reasons if yes: long term antibiotic  Insertion date:08/24/17 Last dressing date:08/31/17   Patient has Jiang Cath: no Reasons if yes:     Insertion date:     Last Vitals:     Vitals:    09/02/17 2000 09/03/17 0658 09/03/17 0733 09/03/17 1553   BP: 128/64 125/62 148/69 121/59   Pulse: 67 62 (!) 59 83   Resp: 15  18 18   Temp: 98.4 °F (36.9 °C)  97.6 °F (36.4 °C) 97.1 °F (36.2 °C)   SpO2: 98%  100% 94%   Weight:       Height:            PAIN    Pain Assessment    Pain Intensity 1: 3 (09/03/17 1900) Pain Intensity 1: 2 (12/29/14 1105)    Pain Location 1: Foot Pain Location 1: Abdomen    Pain Intervention(s) 1: Medication (see MAR) Pain Intervention(s) 1: Medication (see MAR)  Patient Stated Pain Goal: 3 Patient Stated Pain Goal: 0  o Intervention effective: no    o Other actions taken for pain: pain medicine     Skin Assessment  Skin color Skin Color: Appropriate for ethnicity  Condition/Temperature Skin Condition/Temp: Warm  Integrity Skin Integrity: Incision (comment)  Turgor Turgor: Non-tenting  Weekly Pressure Ulcer Documentation  Pressure  Injury Documentation: No Pressure Injury Noted-Pressure Ulcer Prevention Initiated  Wound Prevention & Protection Methods  Orientation of wound Orientation of Wound Prevention: Posterior  Location of Prevention Location of Wound Prevention: Sacrum/Coccyx  Dressing Present Dressing Present : No  Dressing Status Dressing Status: Intact  Wound Offloading Wound Offloading (Prevention Methods): Bed, pressure redistribution/air, Chair cushion, Wheelchair     INTAKE/OUPUT    Date 09/02/17 1900 - 09/03/17 0659 09/03/17 0700 - 09/04/17 0659   Shift 7277-1376 24 Hour Total 0068-3511 5494-4588 24 Hour Total   I  N  T  A  K  E   P. O.  680 720  720      P. O.  680 720  720    Shift Total  (mL/kg)  680  (9.6) 720  (10.2)  720  (10.2)   O  U  T  P  U  T   Urine  (mL/kg/hr) 425 1125 800  (0.9)  800      Urine Voided 425 1125 800  800      Urine Occurrence(s) 3 x 6 x 3 x  3 x    Stool           Stool Occurrence(s) 1 x 2 x 1 x  1 x    Shift Total  (mL/kg) 425  (6) 1125  (15.9) 800  (11.3)  800  (11.3)   NET -425 -445 -80  -80   Weight (kg) 70.8 70.8 70.8 70.8 70.8       Recommendations:  1. Patient needs and requests: pain medicine    2. Diet: cardiac    3. Pending tests/procedures: no     4. Functional Level/Equipment: 1 x person assist    5. Estimated Discharge Date: TDB Posted on Whiteboard in Patients Room: [facility] Safety Check    A safety check occurred in the patient's room between off going nurse and oncoming nurse listed above.     The safety check included the below items  Area Items   H  High Alert Medications - Verify all high alert medication drips (heparin, PCA, etc.)   E  Equipment - Suction is set up for ALL patients (with yanker)  - Red plugs utilized for all equipment (IV pumps, etc.)  - WOWs wiped down at end of shift.  - Room stocked with oxygen, suction, and other unit-specific supplies   A  Alarms - Bed alarm is set for fall risk patients  - Ensure chair alarm is in place and activated if patient is up in a chair   L  Lines - Check IV for any infiltration  - Jiang bag is empty if patient has a Jiang   - Tubing and IV bags are labeled   S  Safety   - Room is clean, patient is clean, and equipment is clean.  - Hallways are clear from equipment besides carts. - Fall bracelet on for fall risk patients  - Ensure room is clear and free of clutter  - Suction is set up for ALL patients (with cailin)  - Hallways are clear from equipment besides carts.    - Isolation precautions followed, supplies available outside room, sign posted

## 2017-09-03 NOTE — PROGRESS NOTES
Progress Note    Patient: Natalia Huerta MRN: [id number]  CSN: [id number]    YOB: 1960  Age: 59 y.o. Sex: male    DOA: 8/25/2017 LOS:  LOS: 9 days                    Subjective:     Primary Rehab Diagnosis: Impaired Mobility and ADLs secondary to:  1. S/P Left above-the-knee amputation (8/18/2017 - Dr. Jono Ruiz)  3. History of ischemic rest pain of the left lower extremity due to severe peripheral vascular disease       Review of systems  General: No fevers or chills. Cardiovascular: No chest pain or pressure. No palpitations. Pulmonary: No shortness of breath, cough or wheeze. Gastrointestinal: No abdominal pain, nausea, vomiting or diarrhea. Genitourinary: No urinary frequency, urgency, hesitancy or dysuria. Musculoskeletal: wound VAC Lt AKA stump, pain Rt foot sup Rt ankle ulcer . Neurologic: No headache,generalized weakness    Objective:     Physical Exam:  Visit Vitals    /69    Pulse (!) 59    Temp 97.6 °F (36.4 °C)    Resp 18    Ht 5' 10\" (1.778 m)    Wt 70.8 kg (156 lb 1.4 oz)    SpO2 100%    BMI 22.4 kg/m2        General:         Alert, cooperative, no acute distress    HEENT: NC, Atraumatic. PERRLA, anicteric sclerae. Lungs: CTA Bilaterally. No Wheezing/Rhonchi/Rales. Heart:  Regular  rhythm,  No murmur, No Rubs, No Gallops  Abdomen: Soft, Non distended, Non tender.  +Bowel sounds, no HSM  Extremities: Lt AKA amputation wound VACC in place  Psych:    Not anxious or agitated. Neurologic:  CN 2-12 grossly intact, Alert and oriented X 3.   No acute neurological                                 Deficits,     Intake and Output:  Current Shift:  09/03 0701 - 09/03 1900  In: 480 [P.O.:480]  Out: 800 [Urine:800]  Last three shifts:  09/01 1901 - 09/03 0700  In: 680 [P.O.:680]  Out: 3125 [Urine:3125]    Labs: Results:       Chemistry Recent Labs      09/03/17   0345   GLU  81   NA  134*   K  3.9   CL  99*   CO2  27   BUN  21*   CREA  1.13   CA  8.8   AGAP  8 BUCR  19   AP  408*   TP  7.9   ALB  2.8*   GLOB  5.1*   AGRAT  0.5*      CBC w/Diff Recent Labs      09/03/17   0345   WBC  10.7   RBC  2.95*   HGB  8.6*   HCT  26.4*   PLT  637*   GRANS  71   LYMPH  19*   EOS  4      Cardiac Enzymes No results for input(s): CPK, CKND1, LEO in the last 72 hours. No lab exists for component: CKRMB, TROIP   Coagulation Recent Labs      09/03/17   0345  09/02/17   0629   PTP  29.8*  27.9*   INR  2.9*  2.7*       Lipid Panel Lab Results   Component Value Date/Time    Cholesterol, total 157 12/29/2015 07:18 AM    HDL Cholesterol 28 12/29/2015 07:18 AM    LDL, calculated 112.4 12/29/2015 07:18 AM    VLDL, calculated 16.6 12/29/2015 07:18 AM    Triglyceride 83 12/29/2015 07:18 AM    CHOL/HDL Ratio 5.6 12/29/2015 07:18 AM      BNP No results for input(s): BNPP in the last 72 hours.    Liver Enzymes Recent Labs      09/03/17   0345   TP  7.9   ALB  2.8*   AP  408*   SGOT  28      Thyroid Studies Lab Results   Component Value Date/Time    TSH 6.74 08/02/2017 05:41 AM          Procedures/imaging: see electronic medical records for all procedures/Xrays and details which were not copied into this note but were reviewed prior to creation of Plan    Medications:   Current Facility-Administered Medications   Medication Dose Route Frequency    warfarin (COUMADIN) tablet 1 mg  1 mg Oral ONCE    alteplase (CATHFLO) 1 mg in sterile water (preservative free) 1 mL injection  1 mg InterCATHeter PRN    losartan (COZAAR) tablet 100 mg  100 mg Oral DAILY    acetaminophen (TYLENOL) tablet 650 mg  650 mg Oral Q4H PRN    docusate sodium (COLACE) capsule 100 mg  100 mg Oral BID    bisacodyl (DULCOLAX) tablet 10 mg  10 mg Oral Q48H PRN    lactobacillus sp. 50 billion cpu (BIO-K PLUS) capsule 1 Cap  1 Cap Oral DAILY    magnesium oxide (MAG-OX) tablet 400 mg  400 mg Oral DAILY    gabapentin (NEURONTIN) capsule 300 mg  300 mg Oral Q8H    furosemide (LASIX) tablet 20 mg  20 mg Oral DAILY    HYDROmorphone (DILAUDID) tablet 2-4 mg  2-4 mg Oral Q4H PRN    potassium chloride (KLOR-CON) packet 20 mEq  20 mEq Oral DAILY    polyethylene glycol (MIRALAX) packet 17 g  17 g Oral DAILY    amiodarone (CORDARONE) tablet 200 mg  200 mg Oral DAILY    aspirin chewable tablet 81 mg  81 mg Oral DAILY WITH BREAKFAST    cholecalciferol (VITAMIN D3) tablet 2,000 Units  2,000 Units Oral DAILY    ferrous sulfate tablet 325 mg  1 Tab Oral DAILY WITH BREAKFAST    ascorbic acid (vitamin C) (VITAMIN C) tablet 250 mg  250 mg Oral DAILY WITH BREAKFAST    zinc sulfate (ZINCATE) capsule 220 mg  1 Cap Oral DAILY    atorvastatin (LIPITOR) tablet 40 mg  40 mg Oral QHS    metoprolol tartrate (LOPRESSOR) tablet 25 mg  25 mg Oral Q12H    cyanocobalamin tablet 1,000 mcg  1,000 mcg Oral DAILY    WARFARIN INFORMATION NOTE (COUMADIN)   Other Rx Dosing/Monitoring    ampicillin-sulbactam (UNASYN) 3 g in 0.9% sodium chloride (MBP/ADV) 100 mL MBP  3 g IntraVENous Q6H       Assessment/Plan     Principal Problem:    Status post above knee amputation of left lower extremity (HCC) (8/18/2017)      Overview: S/P Left above-the-knee amputation (8/18/2017 - Dr. Cait Garcia)          Active Problems:    Benign hypertensive heart disease with systolic congestive heart failure, NYHA class 2 ([facility] (1/26/2015)      Peripheral artery disease ([facility] (1/25/2017)      Paroxysmal atrial fibrillation (HCC) ()      Acute blood loss as cause of postoperative anemia (7/25/2017)      Impaired mobility and ADLs (7/24/2017)      Anticoagulated on Coumadin ()      Aftercare following surgery of the circulatory system (7/25/2017)      Overview: S/P Removal of infected graft; Repair of left superficial femoral artery;       Repair of left common femoral artery (7/25/2017 - Dr. Lauren Robertson); S/P Right axillary femoral jump bypass interposition; Removal of       infected right axillary femoral bypass;  Wound VAC placement of upper thigh 1.2 cm x 5.2 cm x 1.8 cm and groin 4 cm x 0.5 cm x 0.2 cm (8/18/2017 - Dr. Courtney Alvarez)      Infection of vascular bypass graft ([facility] (7/24/2017)      Overview: Left lower extremity      Chronic ulcer of right heel ([address] (8/8/2017)      Ischemic rest pain of lower extremity ([address] (8/14/2017)      Overview: Left      Aftercare for amputation stump (8/18/2017)      Overview: S/P Left above-the-knee amputation (8/18/2017 - Dr. Courtney Alvarez)      Skin ulcer of malleolar area of right ankle ([address] ()    plan  INR is up today 2.9 will decrease coumadin dose to 1 mg recheck in AM  F/u podiatry Dr Radha Peterson  Amiodarone 200 mg daily  Lipitor 40 mg qhs  Neurontin 300 mg q8h  Lasix 20 mg monitor kidney function  Monitor Bp on Cozaar and metoprolol  H&H improving Cr stable    Arturo Evans MD  9/3/2017 2:29 PM

## 2017-09-03 NOTE — ROUTINE PROCESS
SHIFT CHANGE NOTE FOR [facility]    Bedside and Verbal shift change report given to Gabe Mclaughlin RN (oncoming nurse) by Alan Arenas RN   (offgoing nurse). Report included the following information SBAR, Kardex, MAR and Recent Results. Situation:   Code Status: Full Code   Reason for Admission: left AKA  Hospital Day: 9   Problem List:   Hospital Problems  Date Reviewed: 9/1/2017          Codes Class Noted POA    Skin ulcer of malleolar area of right ankle ([facility] (Chronic) ICD-10-CM: L97.319  ICD-9-CM: 707.13  Unknown Yes        * (Principal)Status post above knee amputation of left lower extremity ([facility] [address] [facility] ICD-10-CM: V36.751  ICD-9-CM: V49.76  8/18/2017 Yes    Overview Signed 8/21/2017 10:21 PM by Bianca Hammond MD     S/P Left above-the-knee amputation (8/18/2017 - Dr. Khang Oneal)               Aftercare for amputation stump ICD-10-CM: Z47.81  ICD-9-CM: V54.89  8/18/2017 Yes    Overview Signed 8/21/2017 10:25 PM by Bianca Hammond MD     S/P Left above-the-knee amputation (8/18/2017 - Dr. Khang Oneal)             Ischemic rest pain of lower extremity ([facility] [facility] ICD-10-CM: I73.9  ICD-9-CM: 443.9  8/14/2017 Yes    Overview Signed 8/15/2017 11:09 AM by Bianca Hammond MD     Left             Chronic ulcer of right heel ([facility] (Chronic) ICD-10-CM: L97.419  ICD-9-CM: 707.14  8/8/2017 Yes        Acute blood loss as cause of postoperative anemia ICD-10-CM: D62  ICD-9-CM: 285.1  7/25/2017 Yes        Aftercare following surgery of the circulatory system ICD-10-CM: Z48.812  ICD-9-CM: V58.73  7/25/2017 Yes    Overview Addendum 8/21/2017 10:22 PM by Bianca Hammond MD     S/P Removal of infected graft; Repair of left superficial femoral artery; Repair of left common femoral artery (7/25/2017 - Dr. Khang Oneal); S/P Right axillary femoral jump bypass interposition; Removal of infected right axillary femoral bypass;  Wound VAC placement of upper thigh 1.2 cm x 5.2 cm x 1.8 cm and groin 4 cm x 0.5 cm x 0.2 cm (8/18/2017 - Dr. Helder Martin)             Impaired mobility and ADLs ICD-10-CM: Z74.09  ICD-9-CM: 799.89  7/24/2017 Yes        Infection of vascular bypass graft [facility]) ICD-10-CM: T82. 7XXA  ICD-9-CM: 996.62  7/24/2017 Yes    Overview Signed 8/7/2017  2:19 PM by Bobo Garcia MD     Left lower extremity             Anticoagulated on Coumadin (Chronic) ICD-10-CM: Z51.81, Z79.01  ICD-9-CM: V58.83, V58.61  Unknown Yes        Paroxysmal atrial fibrillation (HCC) ICD-10-CM: I48.0  ICD-9-CM: 427.31  Unknown Yes        Peripheral artery disease ([address] (Chronic) ICD-10-CM: I73.9  ICD-9-CM: 443.9  1/25/2017 Yes        Benign hypertensive heart disease with systolic congestive heart failure, NYHA class 2 (HCC) (Chronic) ICD-10-CM: I11.0, I50.20  ICD-9-CM: 402.11, 428.20, 428.0  1/26/2015 Yes              Background:   Past Medical History:   Past Medical History:   Diagnosis Date    Acute blood loss as cause of postoperative anemia 4/4/2017    Anticoagulated on Coumadin     Aortoiliac occlusive disease ([address] 1/25/2017    Atherosclerosis of native artery of both lower extremities with intermittent claudication ([address] 1/25/2017    Benign hypertensive heart disease with systolic congestive heart failure, NYHA class 2 ([address] 1/26/2015    Carotid artery disease (HCC)     Chronic anemia     Chronic systolic heart failure (HCC)     Chronic ulcer of right foot ([address] 4/4/2017    Chronic ulcer of right heel ([address] 8/8/2017    Coronary artery disease involving native coronary artery of native heart 3/15/2017    Successful stenting of Cx (Xience LESLEY) and RCA (Xience LESLEY) to 0% by Dr. Rashid Calderon on 3/15/17.     DDD (degenerative disc disease), lumbar 1/26/2015    Dyslipidemia     Erectile dysfunction 7/5/2016    Euthyroid sick syndrome 4/6/2017    Hereditary peripheral neuropathy 11/15/2016    History of cardioversion 4/18/2017    S/P Synchronized external cardioversion (4/18/2017 - Dr. Coy Ramirez)    History of vitamin D deficiency 4/22/2017    Vitamin D 25-Hydroxy (4/22/2017) = 12.0; Vitamin D 25-Hydroxy (5/26/2017) = 38.7    Infection of vascular bypass graft ([facility] 7/24/2017    Left lower extremity    Ischemic cardiomyopathy     Moderate to severe pulmonary hypertension ([facility] 7/23/2017    Paroxysmal atrial fibrillation (HCC)     Peripheral artery disease ([facility] 1/25/2017    Skin ulcer of malleolar area of right ankle ([facility]     Spinal stenosis of lumbar region with radiculopathy 5/4/2015    Dr. Angelic Bae      Patient taking anticoagulants no    Patient has a defibrillator: no     Assessment:   Changes in Assessment throughout shift: no     Patient has central line: no Reasons if yes: long term antibiotic  Insertion date:8/24/17 Last dressing date:8/31/17   Patient has Jiang Cath: no Reasons if yes:     Insertion date:     Last Vitals:     Vitals:    09/02/17 1632 09/02/17 2000 09/03/17 0658 09/03/17 0733   BP: 134/62 128/64 125/62 148/69   Pulse: 100 67 62 (!) 59   Resp: 18 15  18   Temp: 96.7 °F (35.9 °C) 98.4 °F (36.9 °C)  97.6 °F (36.4 °C)   SpO2: 91% 98%  100%   Weight:       Height:            PAIN    Pain Assessment    Pain Intensity 1: 1 (09/03/17 0757) Pain Intensity 1: 2 (12/29/14 1105)    Pain Location 1: Leg Pain Location 1: Abdomen    Pain Intervention(s) 1: Medication (see MAR) Pain Intervention(s) 1: Medication (see MAR)  Patient Stated Pain Goal: 0 Patient Stated Pain Goal: 0  o Intervention effective: no    o Other actions taken for pain: pain medicine     Skin Assessment  Skin color Skin Color: Appropriate for ethnicity  Condition/Temperature Skin Condition/Temp: Cool  Integrity Skin Integrity: Incision (comment), Lesion (comment)  Turgor Turgor: Non-tenting  Weekly Pressure Ulcer Documentation  Pressure  Injury Documentation: No Pressure Injury Noted-Pressure Ulcer Prevention Initiated  Wound Prevention & Protection Methods  Orientation of wound Orientation of Wound Prevention: Posterior  Location of Prevention Location of Wound Prevention: Sacrum/Coccyx  Dressing Present Dressing Present : No  Dressing Status Dressing Status: Intact  Wound Offloading Wound Offloading (Prevention Methods): Bed, pressure redistribution/air, Chair cushion, Wheelchair     INTAKE/OUPUT    Date 09/02/17 0700 - 09/03/17 0659 09/03/17 0700 - 09/04/17 0659   Shift 0700-1859 1900-0659 24 Hour Total 0700-1859 1900-0659 24 Hour Total   I  N  T  A  K  E   P. O. 680  680         P. O. 680  680       Shift Total  (mL/kg) 680  (9.6)  680  (9.6)      O  U  T  P  U  T   Urine  (mL/kg/hr) 700  (0.8) 425  (0.5) 1125  (0.7)         Urine Voided          Urine Occurrence(s) 3 x 3 x 6 x 0 x  0 x    Stool            Stool Occurrence(s) 1 x 1 x 2 x 0 x  0 x    Shift Total  (mL/kg) 700  (9.9) 425  (6) 1125  (15.9)      NET -20 -985 -882      Weight (kg) 70.8 70.8 70.8 70.8 70.8 70.8       Recommendations:  1. Patient needs and requests: pain medicine    2. Diet: cardiac    3. Pending tests/procedures: no     4. Functional Level/Equipment: 1 x person assist    5. Estimated Discharge Date: TDB Posted on Whiteboard in Patients Room: [facility] Safety Check    A safety check occurred in the patient's room between off going nurse and oncoming nurse listed above.     The safety check included the below items  Area Items   H  High Alert Medications - Verify all high alert medication drips (heparin, PCA, etc.)   E  Equipment - Suction is set up for ALL patients (with yanker)  - Red plugs utilized for all equipment (IV pumps, etc.)  - WOWs wiped down at end of shift.  - Room stocked with oxygen, suction, and other unit-specific supplies   A  Alarms - Bed alarm is set for fall risk patients  - Ensure chair alarm is in place and activated if patient is up in a chair   L  Lines - Check IV for any infiltration  - Jiang bag is empty if patient has a Jiang   - Tubing and IV bags are labeled   S  Safety   - Room is clean, patient is clean, and equipment is clean. - Hallways are clear from equipment besides carts. - Fall bracelet on for fall risk patients  - Ensure room is clear and free of clutter  - Suction is set up for ALL patients (with cailin)  - Hallways are clear from equipment besides carts.    - Isolation precautions followed, supplies available outside room, sign posted

## 2017-09-04 ENCOUNTER — APPOINTMENT (OUTPATIENT)
Dept: GENERAL RADIOLOGY | Age: 57
DRG: 516 | End: 2017-09-04
Attending: SURGERY
Payer: COMMERCIAL

## 2017-09-04 LAB
ANION GAP SERPL CALC-SCNC: 8 MMOL/L (ref 3–18)
BUN SERPL-MCNC: 19 MG/DL (ref 7–18)
BUN/CREAT SERPL: 26 (ref 12–20)
CALCIUM SERPL-MCNC: 9 MG/DL (ref 8.5–10.1)
CHLORIDE SERPL-SCNC: 99 MMOL/L (ref 100–108)
CO2 SERPL-SCNC: 27 MMOL/L (ref 21–32)
CREAT SERPL-MCNC: 0.73 MG/DL (ref 0.6–1.3)
GLUCOSE SERPL-MCNC: 84 MG/DL (ref 74–99)
HCT VFR BLD AUTO: 27.5 % (ref 36–48)
HGB BLD-MCNC: 8.7 G/DL (ref 13–16)
INR PPP: 2.5 (ref 0.8–1.2)
PLATELET # BLD AUTO: 652 K/UL (ref 135–420)
POTASSIUM SERPL-SCNC: 4.3 MMOL/L (ref 3.5–5.5)
PROTHROMBIN TIME: 26.1 SEC (ref 11.5–15.2)
SODIUM SERPL-SCNC: 134 MMOL/L (ref 136–145)

## 2017-09-04 PROCEDURE — 85610 PROTHROMBIN TIME: CPT | Performed by: INTERNAL MEDICINE

## 2017-09-04 PROCEDURE — 77030019934 HC DRSG VAC ASST KCON -B

## 2017-09-04 PROCEDURE — 74011250637 HC RX REV CODE- 250/637: Performed by: INTERNAL MEDICINE

## 2017-09-04 PROCEDURE — 74011000258 HC RX REV CODE- 258: Performed by: INTERNAL MEDICINE

## 2017-09-04 PROCEDURE — 77030025414 HC DRSG VAC ASST SMPLC KCON -B

## 2017-09-04 PROCEDURE — 85018 HEMOGLOBIN: CPT | Performed by: INTERNAL MEDICINE

## 2017-09-04 PROCEDURE — 74011250637 HC RX REV CODE- 250/637: Performed by: FAMILY MEDICINE

## 2017-09-04 PROCEDURE — 80048 BASIC METABOLIC PNL TOTAL CA: CPT | Performed by: INTERNAL MEDICINE

## 2017-09-04 PROCEDURE — 74011250636 HC RX REV CODE- 250/636: Performed by: INTERNAL MEDICINE

## 2017-09-04 PROCEDURE — 97110 THERAPEUTIC EXERCISES: CPT

## 2017-09-04 PROCEDURE — 73630 X-RAY EXAM OF FOOT: CPT

## 2017-09-04 PROCEDURE — 77030011256 HC DRSG MEPILEX <16IN NO BORD MOLN -A

## 2017-09-04 PROCEDURE — 97530 THERAPEUTIC ACTIVITIES: CPT

## 2017-09-04 PROCEDURE — 85049 AUTOMATED PLATELET COUNT: CPT | Performed by: INTERNAL MEDICINE

## 2017-09-04 PROCEDURE — 65310000000 HC RM PRIVATE REHAB

## 2017-09-04 RX ADMIN — HYDROMORPHONE HYDROCHLORIDE 4 MG: 2 TABLET ORAL at 15:03

## 2017-09-04 RX ADMIN — HYDROMORPHONE HYDROCHLORIDE 4 MG: 2 TABLET ORAL at 18:51

## 2017-09-04 RX ADMIN — ASPIRIN 81 MG 81 MG: 81 TABLET ORAL at 10:13

## 2017-09-04 RX ADMIN — DOCUSATE SODIUM 100 MG: 100 CAPSULE, LIQUID FILLED ORAL at 10:13

## 2017-09-04 RX ADMIN — AMPICILLIN AND SULBACTAM 3 G: 1; 2 INJECTION, POWDER, FOR SOLUTION INTRAMUSCULAR; INTRAVENOUS at 15:02

## 2017-09-04 RX ADMIN — ZINC SULFATE 220 MG (50 MG) CAPSULE 220 MG: CAPSULE at 10:12

## 2017-09-04 RX ADMIN — GABAPENTIN 300 MG: 300 CAPSULE ORAL at 15:03

## 2017-09-04 RX ADMIN — AMIODARONE HYDROCHLORIDE 200 MG: 200 TABLET ORAL at 10:13

## 2017-09-04 RX ADMIN — AMPICILLIN AND SULBACTAM 3 G: 1; 2 INJECTION, POWDER, FOR SOLUTION INTRAMUSCULAR; INTRAVENOUS at 03:13

## 2017-09-04 RX ADMIN — ATORVASTATIN CALCIUM 40 MG: 40 TABLET, FILM COATED ORAL at 21:14

## 2017-09-04 RX ADMIN — FUROSEMIDE 20 MG: 20 TABLET ORAL at 10:13

## 2017-09-04 RX ADMIN — WARFARIN SODIUM 2.5 MG: 2 TABLET ORAL at 18:06

## 2017-09-04 RX ADMIN — GABAPENTIN 300 MG: 300 CAPSULE ORAL at 05:46

## 2017-09-04 RX ADMIN — METOPROLOL TARTRATE 25 MG: 25 TABLET, FILM COATED ORAL at 10:13

## 2017-09-04 RX ADMIN — AMPICILLIN AND SULBACTAM 3 G: 1; 2 INJECTION, POWDER, FOR SOLUTION INTRAMUSCULAR; INTRAVENOUS at 21:11

## 2017-09-04 RX ADMIN — LOSARTAN POTASSIUM 100 MG: 50 TABLET ORAL at 05:45

## 2017-09-04 RX ADMIN — HYDROMORPHONE HYDROCHLORIDE 2 MG: 2 TABLET ORAL at 05:43

## 2017-09-04 RX ADMIN — Medication 1 CAPSULE: at 10:13

## 2017-09-04 RX ADMIN — HYDROMORPHONE HYDROCHLORIDE 4 MG: 2 TABLET ORAL at 22:32

## 2017-09-04 RX ADMIN — Medication 400 MG: at 10:12

## 2017-09-04 RX ADMIN — POLYETHYLENE GLYCOL 3350 17 G: 17 POWDER, FOR SOLUTION ORAL at 18:09

## 2017-09-04 RX ADMIN — GABAPENTIN 300 MG: 300 CAPSULE ORAL at 22:08

## 2017-09-04 RX ADMIN — CYANOCOBALAMIN TAB 1000 MCG 1000 MCG: 1000 TAB at 10:12

## 2017-09-04 RX ADMIN — AMPICILLIN AND SULBACTAM 3 G: 1; 2 INJECTION, POWDER, FOR SOLUTION INTRAMUSCULAR; INTRAVENOUS at 10:13

## 2017-09-04 RX ADMIN — DOCUSATE SODIUM 100 MG: 100 CAPSULE, LIQUID FILLED ORAL at 18:07

## 2017-09-04 RX ADMIN — Medication 250 MG: at 10:12

## 2017-09-04 RX ADMIN — HYDROMORPHONE HYDROCHLORIDE 4 MG: 2 TABLET ORAL at 00:18

## 2017-09-04 RX ADMIN — Medication 325 MG: at 10:12

## 2017-09-04 RX ADMIN — POTASSIUM CHLORIDE 20 MEQ: 1.5 POWDER, FOR SOLUTION ORAL at 10:14

## 2017-09-04 RX ADMIN — VITAMIN D, TAB 1000IU (100/BT) 2000 UNITS: 25 TAB at 10:12

## 2017-09-04 RX ADMIN — METOPROLOL TARTRATE 25 MG: 25 TABLET, FILM COATED ORAL at 21:14

## 2017-09-04 NOTE — PROGRESS NOTES
Progress Note    Patient: Isai Buck MRN: [id number]  CSN: [id number]    YOB: 1960  Age: 59 y.o. Sex: male    DOA: 8/25/2017 LOS:  LOS: 10 days                    Subjective:     Primary Rehab Diagnosis: Impaired Mobility and ADLs secondary to:  1. S/P Left above-the-knee amputation (8/18/2017 - Dr. Tracy Creston)  3. History of ischemic rest pain of the left lower extremity due to severe peripheral vascular disease     Pt has more swelling and redness this morning vascular  surgery was called x-ray was ordered pt had recent arterial duplex ultrasound will hold pt  And ot elevate the leg to help swelling f/u vascular recommendations     Right leg :  1. Patent common femoral to popliteal artery bypass graft without  evidence of a hemodynamically significant stenosis. 2. Heavily calcified posterior tibial, peroneal, and anterior tibial  arteries with noted monophasic doppler signals. 3 The right ankle/brachial index is 0.53. Review of systems  General: No fevers or chills. Cardiovascular: No chest pain or pressure. No palpitations. Pulmonary: No shortness of breath, cough or wheeze. Gastrointestinal: No abdominal pain, nausea, vomiting or diarrhea. Genitourinary: No urinary frequency, urgency, hesitancy or dysuria. Musculoskeletal: wound VAC Lt AKA stump, pain Rt foot sup Rt ankle ulcer . Neurologic: No headache,generalized weakness    Objective:     Physical Exam:  Visit Vitals    /60    Pulse 68    Temp 97.5 °F (36.4 °C)    Resp 18    Ht 5' 10\" (1.778 m)    Wt 70.8 kg (156 lb 1.4 oz)    SpO2 100%    BMI 22.4 kg/m2        General:         Alert, cooperative, no acute distress    HEENT: NC, Atraumatic. PERRLA, anicteric sclerae. Lungs: CTA Bilaterally. No Wheezing/Rhonchi/Rales.   Heart:  Regular  rhythm,  No murmur, No Rubs, No Gallops  Abdomen: Soft, Non distended, Non tender.  +Bowel sounds, no HSM  Extremities: Lt AKA amputation wound VACC in place  Psych:    Not anxious or agitated. Neurologic:  CN 2-12 grossly intact, Alert and oriented X 3. No acute neurological                                 Deficits,     Intake and Output:  Current Shift:  09/04 0701 - 09/04 1900  In: 240 [P.O.:240]  Out: -   Last three shifts:  09/02 1901 - 09/04 0700  In: 720 [P.O.:720]  Out: 9755 [Urine:3275]    Labs: Results:       Chemistry Recent Labs      09/04/17 0726 09/03/17   0345   GLU  84  81   NA  134*  134*   K  4.3  3.9   CL  99*  99*   CO2  27  27   BUN  19*  21*   CREA  0.73  1.13   CA  9.0  8.8   AGAP  8  8   BUCR  26*  19   AP   --   408*   TP   --   7.9   ALB   --   2.8*   GLOB   --   5.1*   AGRAT   --   0.5*      CBC w/Diff Recent Labs      09/04/17 0726 09/03/17 0345   WBC   --   10.7   RBC   --   2.95*   HGB  8.7*  8.6*   HCT  27.5*  26.4*   PLT  652*  637*   GRANS   --   71   LYMPH   --   19*   EOS   --   4      Cardiac Enzymes No results for input(s): CPK, CKND1, LEO in the last 72 hours. No lab exists for component: CKRMB, TROIP   Coagulation Recent Labs      09/04/17 0726 09/03/17 0345   PTP  26.1*  29.8*   INR  2.5*  2.9*       Lipid Panel Lab Results   Component Value Date/Time    Cholesterol, total 157 12/29/2015 07:18 AM    HDL Cholesterol 28 12/29/2015 07:18 AM    LDL, calculated 112.4 12/29/2015 07:18 AM    VLDL, calculated 16.6 12/29/2015 07:18 AM    Triglyceride 83 12/29/2015 07:18 AM    CHOL/HDL Ratio 5.6 12/29/2015 07:18 AM      BNP No results for input(s): BNPP in the last 72 hours.    Liver Enzymes Recent Labs      09/03/17   0345   TP  7.9   ALB  2.8*   AP  408*   SGOT  28      Thyroid Studies Lab Results   Component Value Date/Time    TSH 6.74 08/02/2017 05:41 AM          Procedures/imaging: see electronic medical records for all procedures/Xrays and details which were not copied into this note but were reviewed prior to creation of Plan    Medications:   Current Facility-Administered Medications   Medication Dose Route Frequency    warfarin (COUMADIN) tablet 2.5 mg  2.5 mg Oral ONCE    alteplase (CATHFLO) 1 mg in sterile water (preservative free) 1 mL injection  1 mg InterCATHeter PRN    losartan (COZAAR) tablet 100 mg  100 mg Oral DAILY    acetaminophen (TYLENOL) tablet 650 mg  650 mg Oral Q4H PRN    docusate sodium (COLACE) capsule 100 mg  100 mg Oral BID    bisacodyl (DULCOLAX) tablet 10 mg  10 mg Oral Q48H PRN    lactobacillus sp. 50 billion cpu (BIO-K PLUS) capsule 1 Cap  1 Cap Oral DAILY    magnesium oxide (MAG-OX) tablet 400 mg  400 mg Oral DAILY    gabapentin (NEURONTIN) capsule 300 mg  300 mg Oral Q8H    furosemide (LASIX) tablet 20 mg  20 mg Oral DAILY    HYDROmorphone (DILAUDID) tablet 2-4 mg  2-4 mg Oral Q4H PRN    potassium chloride (KLOR-CON) packet 20 mEq  20 mEq Oral DAILY    polyethylene glycol (MIRALAX) packet 17 g  17 g Oral DAILY    amiodarone (CORDARONE) tablet 200 mg  200 mg Oral DAILY    aspirin chewable tablet 81 mg  81 mg Oral DAILY WITH BREAKFAST    cholecalciferol (VITAMIN D3) tablet 2,000 Units  2,000 Units Oral DAILY    ferrous sulfate tablet 325 mg  1 Tab Oral DAILY WITH BREAKFAST    ascorbic acid (vitamin C) (VITAMIN C) tablet 250 mg  250 mg Oral DAILY WITH BREAKFAST    zinc sulfate (ZINCATE) capsule 220 mg  1 Cap Oral DAILY    atorvastatin (LIPITOR) tablet 40 mg  40 mg Oral QHS    metoprolol tartrate (LOPRESSOR) tablet 25 mg  25 mg Oral Q12H    cyanocobalamin tablet 1,000 mcg  1,000 mcg Oral DAILY    WARFARIN INFORMATION NOTE (COUMADIN)   Other Rx Dosing/Monitoring    ampicillin-sulbactam (UNASYN) 3 g in 0.9% sodium chloride (MBP/ADV) 100 mL MBP  3 g IntraVENous Q6H       Assessment/Plan     Principal Problem:    Status post above knee amputation of left lower extremity (HCC) (8/18/2017)      Overview: S/P Left above-the-knee amputation (8/18/2017 - Dr. Khang Oneal)          Active Problems:    Benign hypertensive heart disease with systolic congestive heart failure, NYHA class 2 ([address] (1/26/2015)      Peripheral artery disease ([address] (1/25/2017)      Paroxysmal atrial fibrillation (HCC) ()      Acute blood loss as cause of postoperative anemia (7/25/2017)      Impaired mobility and ADLs (7/24/2017)      Anticoagulated on Coumadin ()      Aftercare following surgery of the circulatory system (7/25/2017)      Overview: S/P Removal of infected graft; Repair of left superficial femoral artery;       Repair of left common femoral artery (7/25/2017 - Dr. Zoë Reed); S/P Right axillary femoral jump bypass interposition; Removal of       infected right axillary femoral bypass;  Wound VAC placement of upper thigh       1.2 cm x 5.2 cm x 1.8 cm and groin 4 cm x 0.5 cm x 0.2 cm (8/18/2017 - Dr. Clarence Man)      Infection of vascular bypass graft ([address] (7/24/2017)      Overview: Left lower extremity      Chronic ulcer of right heel ([address] (8/8/2017)      Ischemic rest pain of lower extremity ([address] (8/14/2017)      Overview: Left      Aftercare for amputation stump (8/18/2017)      Overview: S/P Left above-the-knee amputation (8/18/2017 - Dr. Clarence Man)      Skin ulcer of malleolar area of right ankle ([address] ()    plan  INR therapeutic 2.5 will decrease coumadin 2.5 mg x 1 dose  recheck in AM  F/u podiatry Dr Paul Fuentes  Amiodarone 200 mg daily  Lipitor 40 mg qhs  Neurontin 300 mg q8h  Lasix 20 mg monitor kidney function  Monitor Bp on Cozaar and metoprolol  H&H improving Cr stable  F/u X-ray Lt foot f/u vascualr and podiatry    Danna Horta MD  9/4/2017 5:11 PM

## 2017-09-04 NOTE — PROGRESS NOTES
Pt complaining of right leg and foot swelling. He has been up in his wheelchair with his right foot dependent for extended periods of time due to PT. On exam his right heel and lateral ankle wounds have fibrinous tissue and dry eschar over them. 2+ edema with cellulitis from mid calf/shin extending onto toes. Will order a DVT study although unlikely given anticoagulation. Will hold PT today and elevate extremity. Will re evaluate tomorrow.

## 2017-09-04 NOTE — ROUTINE PROCESS
SHIFT CHANGE NOTE FOR [facility]    Bedside and Verbal shift change report given to Joanne Arreola LPN (oncoming nurse) by Haydee Vera RN   (offgoing nurse). Report included the following information SBAR, Kardex, MAR and Recent Results. Situation:   Code Status: Full Code   Reason for Admission: left AKA  Hospital Day: 10   Problem List:   Hospital Problems  Date Reviewed: 9/1/2017          Codes Class Noted POA    Skin ulcer of malleolar area of right ankle ([facility] (Chronic) ICD-10-CM: L97.319  ICD-9-CM: 707.13  Unknown Yes        * (Principal)Status post above knee amputation of left lower extremity ([facility] ICD-10-CM: M19.191  ICD-9-CM: V49.76  8/18/2017 Yes    Overview Signed 8/21/2017 10:21 PM by Gonzalez Milligan MD     S/P Left above-the-knee amputation (8/18/2017 - Dr. Areli Canela)               Aftercare for amputation stump ICD-10-CM: Z47.81  ICD-9-CM: V54.89  8/18/2017 Yes    Overview Signed 8/21/2017 10:25 PM by Gonzalez Milligan MD     S/P Left above-the-knee amputation (8/18/2017 - Dr. Areli Canela)             Ischemic rest pain of lower extremity ([facility] ICD-10-CM: I73.9  ICD-9-CM: 443.9  8/14/2017 Yes    Overview Signed 8/15/2017 11:09 AM by Gonzalez Milligan MD     Left             Ch[facility] ulcer [facility] right heel ([facility] (Chronic) ICD-10-CM: L97.419  ICD-9-CM: 707.14  8/8/2017 Yes        Acute blood loss as cause of postoperative anemia ICD-10-CM: D62  ICD-9-CM: 285.1  7/25/2017 Yes        Aftercare following surgery of the circulatory system ICD-10-CM: Z48.812  ICD-9-CM: V58.73  7/25/2017 Yes    Overview Addendum 8/21/2017 10:22 PM by Gonzlaez Milligan MD     S/P Removal of infected graft; Repair of left superficial femoral artery; Repair of left common femoral artery (7/25/2017 - Dr. Areli Canela); S/P Right axillary femoral jump bypass interposition; Removal of infected right axillary femoral bypass;  Wound VAC placement of upper thigh 1.2 cm x 5.2 cm x 1.8 cm and groin 4 cm x 0.5 cm x 0.2 cm (8/18/2017 - Dr. Starlett Lefort)             Impaired mobility and ADLs ICD-10-CM: Z74.09  ICD-9-CM: 799.89  7/24/2017 Yes        Infection of vascular bypass graft [facility]) ICD-10-CM: T82. 7XXA  ICD-9-CM: 996.62  7/24/2017 Yes    Overview Signed 8/7/2017  2:19 PM by Ena Peterson MD     Left lower extremity             Anticoagulated on Coumadin (Chronic) ICD-10-CM: Z51.81, Z79.01  ICD-9-CM: V58.83, V58.61  Unknown Yes        Paroxysmal atrial fibrillation (HCC) ICD-10-CM: I48.0  ICD-9-CM: 427.31  Unknown Yes        Peripheral artery disease ([facility] (Chronic) ICD-10-CM: I73.9  ICD-9-CM: 443.9  1/25/2017 Yes        Benign hypertensive heart disease with systolic congestive heart failure, NYHA class 2 (HCC) (Chronic) ICD-10-CM: I11.0, I50.20  ICD-9-CM: 402.11, 428.20, 428.0  1/26/2015 Yes              Background:   Past Medical History:   Past Medical History:   Diagnosis Date    Acute blood loss as cause of postoperative anemia 4/4/2017    Anticoagulated on Coumadin     Aortoiliac occlusive disease ([address] 1/25/2017    Atherosclerosis of native artery of both lower extremities with intermittent claudication ([address] 1/25/2017    Benign hypertensive heart disease with systolic congestive heart failure, NYHA class 2 ([address] 1/26/2015    Carotid artery disease (HCC)     Chronic anemia     Chronic systolic heart failure (HCC)     Chronic ulcer of right foot ([address] 4/4/2017    Chronic ulcer of right heel ([address] 8/8/2017    Coronary artery disease involving native coronary artery of native heart 3/15/2017    Successful stenting of Cx (Xience LESLEY) and RCA (Xience LESLEY) to 0% by Dr. Sahu Umpire on 3/15/17.     DDD (degenerative disc disease), lumbar 1/26/2015    Dyslipidemia     Erectile dysfunction 7/5/2016    Euthyroid sick syndrome 4/6/2017    Hereditary peripheral neuropathy 11/15/2016    History of cardioversion 4/18/2017    S/P Synchronized external cardioversion (4/18/2017 - Dr. Debi Pardo)    History of vitamin D deficiency 4/22/2017    Vitamin D 25-Hydroxy (4/22/2017) = 12.0; Vitamin D 25-Hydroxy (5/26/2017) = 38.7    Infection of vascular bypass graft ([facility] 7/24/2017    Left lower extremity    Ischemic cardiomyopathy     Mod[facility] severe [facility] 7/23/2017    Paroxysmal atrial fibrillation (HCC)     Peripheral artery disease ([facility] 1/25/2017    Skin ulcer of malleolar area of right ankle ([facility]     Spinal stenosis of lumbar region with radiculopathy 5/4/2015    Dr. Tremayne Maldonado      Patient taking anticoagulants yes   Patient has a defibrillator: no     Assessment:   Changes in Assessment throughout shift: no     Patient has central line: yes Reasons if yes: long term antibiotic  Insertion date:08/24/17 Last dressing date:08/31/17   Patient has Jiang Cath: no Reasons if yes:     Insertion date:     Last Vitals:     Vitals:    09/03/17 0733 09/03/17 1553 09/03/17 2000 09/03/17 2118   BP: 148/69 121/59 140/63 140/63   Pulse: (!) 59 83 77 77   Resp: 18 18 18    Temp: 97.6 °F (36.4 °C) 97.1 °F (36.2 °C) 99.9 °F (37.7 °C)    SpO2: 100% 94% 97%    Weight:       Height:            PAIN    Pain Assessment    Pain Intensity 1: 0 (09/04/17 0100) Pain Intensity 1: 2 (12/29/14 1105)    Pain Location 1: Foot Pain Location 1: Abdomen    Pain Intervention(s) 1: Medication (see MAR) Pain Intervention(s) 1: Medication (see MAR)  Patient Stated Pain Goal: 0 Patient Stated Pain Goal: 0  o Intervention effective: no    o Other actions taken for pain: pain medicine     Skin Assessment  Skin color Skin Color: Appropriate for ethnicity  Condition/Temperature Skin Condition/Temp: Warm  Integrity Skin Integrity: Incision (comment)  Turgor Turgor: Non-tenting  Weekly Pressure Ulcer Documentation  Pressure  Injury Documentation: No Pressure Injury Noted-Pressure Ulcer Prevention Initiated  Wound Prevention & Protection Methods  Orientation of wound Orientation of Wound Prevention: Posterior  Location of Prevention Location of Wound Prevention: Sacrum/Coccyx  Dressing Present Dressing Present : No  Dressing Status Dressing Status: Intact  Wound Offloading Wound Offloading (Prevention Methods): Bed, pressure redistribution/air     INTAKE/OUPUT    Date 09/03/17 0700 - 09/04/17 0659 09/04/17 0700 - 09/05/17 0659   Shift 5429-5613 2530-5125 24 Hour Total 3992-7146 8145-8788 24 Hour Total   I  N  T  A  K  E   P. O. 720  720         P. O. 720  720       Shift Total  (mL/kg) 720  (10.2)  720  (10.2)      O  U  T  P  U  T   Urine  (mL/kg/hr) 800  (0.9) 2050 2850         Urine Voided 800 2050 2850         Urine Occurrence(s) 3 x 5 x 8 x       Stool            Stool Occurrence(s) 1 x 0 x 1 x       Shift Total  (mL/kg) 800  (11.3) 2050  (29) 2850  (40.3)      NET -80 -2050 -2130      Weight (kg) 70.8 70.8 70.8 70.8 70.8 70.8       Recommendations:  1. Patient needs and requests: pain medicine    2. Diet: cardiac    3. Pending tests/procedures: no     4. Functional Level/Equipment: 1 x person assist    5. Estimated Discharge Date: TDB Posted on Whiteboard in Patients Room: [facility] Safety Check    A safety check occurred in the patient's room between off going nurse and oncoming nurse listed above. The safety check included the below items  Area Items   H  High Alert Medications - Verify all high alert medication drips (heparin, PCA, etc.)   E  Equipment - Suction is set up for ALL patients (with yanker)  - Red plugs utilized for all equipment (IV pumps, etc.)  - WOWs wiped down at end of shift.  - Room stocked with oxygen, suction, and other unit-specific supplies   A  Alarms - Bed alarm is set for fall risk patients  - Ensure chair alarm is in place and activated if patient is up in a chair   L  Lines - Check IV for any infiltration  - Jiang bag is empty if patient has a Jiang   - Tubing and IV bags are labeled   S  Safety   - Room is clean, patient is clean, and equipment is clean.   - Hallways are clear from equipment besides carts. - Fall bracelet on for fall risk patients  - Ensure room is clear and free of clutter  - Suction is set up for ALL patients (with cailin)  - Hallways are clear from equipment besides carts.    - Isolation precautions followed, supplies available outside room, sign posted

## 2017-09-04 NOTE — PROGRESS NOTES
Problem: Mobility Impaired (Adult and Pediatric)  Goal: *Acute Goals and Plan of Care (Insert Text)  Physical Therapy Short Term Goals  Initiated 8/26/2017 and to be accomplished within 7 day(s) 09/02/2017  1. Patient will move from supine to sit and sit to supine , scoot up and down and roll side to side in bed with independence. 2. Patient will transfer from bed to chair and chair to bed with supervision/set-up using the least restrictive device. 3. Patient will perform sit to stand with supervision/set-up. 4. Patient will ambulate with supervision/set-up for 50 feet with the least restrictive device. 5. Patient will ascend/descend 3 stairs with 2 handrail(s) with minimal assistance/contact guard assist.    Physical Therapy Long Term Goals  Initiated 8/26/2017 and to be accomplished within 14 day(s) 09/09/2017  1. Patient will move from supine to sit and sit to supine , scoot up and down and roll side to side in bed with independence. 2. Patient will transfer from bed to chair and chair to bed with modified independence using the least restrictive device. 3. Patient will perform sit to stand with modified independence. 4. Patient will ambulate with modified independence for 50 feet with the least restrictive device. 5. Patient will ascend/descend 3 stairs with 2 handrail(s) with supervision/set-up. PHYSICAL THERAPY DAILY NOTE  Patient Name:Mj Lopez  Time In: 0800  Time Out: 0906  Diagnosis: Left AKA  Status post above knee amputation of left lower extremity ([facility] Status post above knee amputation of left lower extremity ([facility])  Precautions: Fall risk     Subjective: Patient reports R LE discomfort and notes that he is hoping to discharge his wound vac soon. Patient with intermittent and quite mild confusion though A/O x 4. Pain at start of tx:Burning pain noted across dorsum of R foot in non-weightbearing. Pt does not quantify.   Pain at stop of tx:Burning pain noted across dorsum of R foot in non-weightbearing. Pt does not quantify. Patient identified with name and : YES         Objective: Patient demonstrates erythema, edema, and warmth to touch from mid tibia of R lower leg distally. Patient verbalizes concern about R lower limb integrity stating \"I don't want to loss this one too. \" discussed pt presentation w/ nursing and called Dr. Radha Almanzar for f/u. GROSS ASSESSMENT Daily Assessment     AROM: Within functional limits  PROM: Within functional limits  Strength: Within functional limits  Coordination: Within functional limits  Tone: Normal  Sensation: Intact          BED/MAT MOBILITY Daily Assessment     Rolling Right : 7 (Independent)  Rolling Left : 7 (Independent)  Supine to Sit : 7 (Independent)  Sit to Supine : 7 (Independent)          TRANSFERS Daily Assessment     Transfer Type: Other  Other: Patient performs stand hop technique w/ RW for transfers to/from w/c and mat table. Patient self cues proper hand placement though he relies heavy on B UE push off for STS transition. Performed x 2 in session with SPV  Transfer Assistance : 5 (Supervision/setup) (Distant SPV)  Sit to Stand Assistance: Supervision (Distant SPV)  Car Transfers: Not tested  Car Type: NA          BALANCE Daily Assessment     Sitting - Static: Good (unsupported)  Sitting - Dynamic: Good (unsupported)  Standing - Static: Fair;Good  Standing - Dynamic : Impaired      Patient demonstrates some difficulty with standing balance when transitioning hands from w/c to RW. WHEELCHAIR MOBILITY Daily Assessment     Able to Propel (ft): 262 feet  Functional Level: 6  Curbs/Ramps Assist Required (FIM Score): 6 (Modified independent)  Wheelchair Setup Assist Required : 6 (Modified independent)  Wheelchair Management: Manages left brake;Manages right brake;Manages right footrest      B UE technique with appropriate force and speed. Patient requires no physical assist or verbal cues to complete activity. LOWER EXTREMITY EXERCISES Daily Assessment     Patient performs supine L glut bridge with bolster 12 reps x 3 sets with 90 sec break between sets. SPV level     Patient also performs hip abd B in S/L. Patient performs B activity 12 reps x 2 sets each. Assessment: Patient demonstrates s/s consistent with cellulitis on initial presentation to PT this AM; nursing and MD contacted with further discussion had with vascular. Treatment modified to therapeutic exercise in supine and minimal therapeutic activity to challenge transfer approach and wheel chair mobility. Unable to fully assess functional ability this AM 2/2 modified session and vascular hold on PT order input at 1100 following consultation. Weekly assessment to be performed next session. Plan of Care: Cont current POC; return to full treatment plan as patient tolerates. Cheng Martinez, PT DPT  9/4/2017

## 2017-09-04 NOTE — ROUTINE PROCESS
SHIFT CHANGE NOTE FOR [facility]    Bedside and Verbal shift change report given to Leigh Ann Worthy (oncoming nurse) by Osmani Marinelli LPN   (offgoing nurse). Report included the following information SBAR, Kardex, MAR and Recent Results. Situation:   Code Status: Full Code   Reason for Admission: left AKA  Hospital Day: 10   Problem List:   Hospital Problems  Date Reviewed: 9/1/2017          Codes Class Noted POA    Skin ulcer of malleolar area of right ankle ([facility] [address] (Chronic) ICD-10-CM: L97.319  ICD-9-CM: 707.13  Unknown Yes        * (Principal)Status post above knee amputation of left lower extremity ([facility] ICD-10-CM: P10.980  ICD-9-CM: V49.76  8/18/2017 Yes    Overview Signed 8/21/2017 10:21 PM by Joyce Lagunas MD     S/P Left above-the-knee amputation (8/18/2017 - Dr. Ana M Suazo)               Aftercare for amputation stump ICD-10-CM: Z47.81  ICD-9-CM: V54.89  8/18/2017 Yes    Overview Signed 8/21/2017 10:25 PM by Joyce Lagunas MD     S/P Left above-the-knee amputation (8/18/2017 - Dr. Ana M Suazo)             Ischemic rest pain of lower extremity ([facility] ICD-10-CM: I73.9  ICD-9-CM: 443.9  8/14/2017 Yes    Overview Signed 8/15/2017 11:09 AM by Joyce Lagunas MD     Left             Chronic ulcer of right heel ([facility] (Chronic) ICD-10-CM: L97.419  ICD-9-CM: 707.14  8/8/2017 Yes        Acute blood loss as cause of postoperative anemia ICD-10-CM: D62  ICD-9-CM: 285.1  7/25/2017 Yes        Aftercare following surgery of the circulatory system ICD-10-CM: Z48.812  ICD-9-CM: V58.73  7/25/2017 Yes    Overview Addendum 8/21/2017 10:22 PM by Joyce Lagunas MD     S/P Removal of infected graft; Repair of left superficial femoral artery; Repair of left common femoral artery (7/25/2017 - Dr. Ana M Suazo); S/P Right axillary femoral jump bypass interposition; Removal of infected right axillary femoral bypass;  Wound VAC placement of upper thigh 1.2 cm x 5.2 cm x 1.8 cm and groin 4 cm x 0.5 cm x 0.2 cm (8/18/2017 - Dr. Estelle Brannon)             Impaired mobility and ADLs ICD-10-CM: Z74.09  ICD-9-CM: 799.89  7/24/2017 Yes        Infection of vascular bypass graft [facility]) ICD-10-CM: T82. 7XXA  ICD-9-CM: 996.62  7/24/2017 Yes    Overview Signed 8/7/2017  2:19 PM by Dina Centeno MD     Left lower extremity             Anticoagulated on Coumadin (Chronic) ICD-10-CM: Z51.81, Z79.01  ICD-9-CM: V58.83, V58.61  Unknown Yes        Paroxysmal atrial fibrillation (HCC) ICD-10-CM: I48.0  ICD-9-CM: 427.31  Unknown Yes        Peripheral artery disease ([facility] (Chronic) ICD-10-CM: I73.9  ICD-9-CM: 443.9  1/25/2017 Yes        Benign hypertensive heart disease with systolic congestive heart failure, NYHA class 2 (HCC) (Chronic) ICD-10-CM: I11.0, I50.20  ICD-9-CM: 402.11, 428.20, 428.0  1/26/2015 Yes              Background:   Past Medical History:   Past Medical History:   Diagnosis Date    Acute blood loss as cause of postoperative anemia 4/4/2017    Anticoagulated on Coumadin     Aortoiliac occlusive disease ([address] 1/25/2017    Atherosclerosis of native artery of both lower extremities with intermittent claudication ([address] 1/25/2017    Benign hypertensive heart disease with systolic congestive heart failure, NYHA class 2 ([address] 1/26/2015    Carotid artery disease (HCC)     Chronic anemia     Chronic systolic heart failure (HCC)     Chronic ulcer of right foot ([address] 4/4/2017    Chronic ulcer of right heel ([address] 8/8/2017    Coronary artery disease involving native coronary artery of native heart 3/15/2017    Successful stenting of Cx (Xience LESLEY) and RCA (Xience LESLEY) to 0% by Dr. Catherine Nye on 3/15/17.     DDD (degenerative disc disease), lumbar 1/26/2015    Dyslipidemia     Erectile dysfunction 7/5/2016    Euthyroid sick syndrome 4/6/2017    Hereditary peripheral neuropathy 11/15/2016    History of cardioversion 4/18/2017    S/P Synchronized external cardioversion (4/18/2017 - Dr. Anay Miller)    History of vitamin D deficiency 4/22/2017    Vitamin D 25-Hydroxy (4/22/2017) = 12.0; Vitamin D 25-Hydroxy (5/26/2017) = 38.7    Infection of vascular bypass graft ([facility] 7/24/2017    Left lower extremity    Ischemic cardiomyopathy     Mod[facility] severe [facility] 7/23/2017    Paroxysmal atrial fibrillation (HCC)     Peripheral artery disease ([facility] 1/25/2017    Skin ulcer of malleolar area of right ankle ([facility]     Spinal stenosis of lumbar region with radiculopathy 5/4/2015    Dr. Sandi Hull      Patient taking anticoagulants yes   Patient has a defibrillator: no     Assessment:   Changes in Assessment throughout shift: no     Patient has central line: yes Reasons if yes: long term antibiotic  Insertion date:08/24/17 Last dressing date:08/31/17   Patient has Jiang Cath: no Reasons if yes:     Insertion date:     Last Vitals:     Vitals:    09/03/17 2118 09/04/17 0545 09/04/17 0800 09/04/17 1600   BP: 140/63 137/69 124/60 123/59   Pulse: 77 66 68 65   Resp:   18 18   Temp:   97.5 °F (36.4 °C) 98.2 °F (36.8 °C)   SpO2:   100% 99%   Weight:       Height:            PAIN    Pain Assessment    Pain Intensity 1: 6 (09/04/17 1600) Pain Intensity 1: 2 (12/29/14 1105)    Pain Location 1: Foot Pain Location 1: Abdomen    Pain Intervention(s) 1: Medication (see MAR) Pain Intervention(s) 1: Medication (see MAR)  Patient Stated Pain Goal: 0 Patient Stated Pain Goal: 0  o Intervention effective: no    o Other actions taken for pain: pain medicine     Skin Assessment  Skin color Skin Color: Appropriate for ethnicity  Condition/Temperature Skin Condition/Temp: Warm  Integrity Skin Integrity: Incision (comment)  Turgor Turgor: Non-tenting  Weekly Pressure Ulcer Documentation  Pressure  Injury Documentation: No Pressure Injury Noted-Pressure Ulcer Prevention Initiated  Wound Prevention & Protection Methods  Orientation of wound Orientation of Wound Prevention: Posterior  Location of Prevention Location of Wound Prevention: Buttocks, Heel, Sacrum/Coccyx  Dressing Present Dressing Present : No  Dressing Status Dressing Status: Intact  Wound Offloading Wound Offloading (Prevention Methods): Bed, pressure redistribution/air     INTAKE/OUPUT    Date 09/03/17 0700 - 09/04/17 0659 09/04/17 0700 - 09/05/17 0659   Shift 0700-1859 1900-0659 24 Hour Total 0700-1859 1900-0659 24 Hour Total   I  N  T  A  K  E   P. O. 720  720 240  240      P. O. 720  720 240  240    Shift Total  (mL/kg) 720  (10.2)  720  (10.2) 240  (3.4)  240  (3.4)   O  U  T  P  U  T   Urine  (mL/kg/hr) 800  (0.9) 2050  (2.4) 2850  (1.7) 1640  1640      Urine Voided 800 2050 2850 1640  1640      Urine Occurrence(s) 3 x 7 x 10 x 9 x  9 x    Stool            Stool Occurrence(s) 1 x 0 x 1 x 2 x  2 x    Shift Total  (mL/kg) 800  (11.3) 2050  (29) 2850  (40.3) 1640  (23.2)  1640  (23.2)   NET -80 -2050 -2130 -1400  -1400   Weight (kg) 70.8 70.8 70.8 70.8 70.8 70.8       Recommendations:  1. Patient needs and requests: pain medicine    2. Diet: cardiac    3. Pending tests/procedures: no     4. Functional Level/Equipment: 1 x person assist    5. Estimated Discharge Date: TDB Posted on Whiteboard in Patients Room: [facility] Safety Check    A safety check occurred in the patient's room between off going nurse and oncoming nurse listed above.     The safety check included the below items  Area Items   H  High Alert Medications - Verify all high alert medication drips (heparin, PCA, etc.)   E  Equipment - Suction is set up for ALL patients (with yanker)  - Red plugs utilized for all equipment (IV pumps, etc.)  - WOWs wiped down at end of shift.  - Room stocked with oxygen, suction, and other unit-specific supplies   A  Alarms - Bed alarm is set for fall risk patients  - Ensure chair alarm is in place and activated if patient is up in a chair   L  Lines - Check IV for any infiltration  - Jiang bag is empty if patient has a Jiang   - Tubing and IV bags are labeled S  Safety   - Room is clean, patient is clean, and equipment is clean. - Hallways are clear from equipment besides carts. - Fall bracelet on for fall risk patients  - Ensure room is clear and free of clutter  - Suction is set up for ALL patients (with cailin)  - Hallways are clear from equipment besides carts.    - Isolation precautions followed, supplies available outside room, sign posted

## 2017-09-05 LAB
INR PPP: 2.4 (ref 0.8–1.2)
PROTHROMBIN TIME: 25 SEC (ref 11.5–15.2)

## 2017-09-05 PROCEDURE — 74011250637 HC RX REV CODE- 250/637: Performed by: INTERNAL MEDICINE

## 2017-09-05 PROCEDURE — 97110 THERAPEUTIC EXERCISES: CPT

## 2017-09-05 PROCEDURE — 74011250636 HC RX REV CODE- 250/636: Performed by: INTERNAL MEDICINE

## 2017-09-05 PROCEDURE — 97116 GAIT TRAINING THERAPY: CPT

## 2017-09-05 PROCEDURE — 85610 PROTHROMBIN TIME: CPT | Performed by: INTERNAL MEDICINE

## 2017-09-05 PROCEDURE — 97530 THERAPEUTIC ACTIVITIES: CPT

## 2017-09-05 PROCEDURE — 97535 SELF CARE MNGMENT TRAINING: CPT

## 2017-09-05 PROCEDURE — 74011000258 HC RX REV CODE- 258: Performed by: INTERNAL MEDICINE

## 2017-09-05 PROCEDURE — 77030011256 HC DRSG MEPILEX <16IN NO BORD MOLN -A

## 2017-09-05 PROCEDURE — 65310000000 HC RM PRIVATE REHAB

## 2017-09-05 RX ADMIN — ASPIRIN 81 MG 81 MG: 81 TABLET ORAL at 08:04

## 2017-09-05 RX ADMIN — HYDROMORPHONE HYDROCHLORIDE 4 MG: 2 TABLET ORAL at 08:03

## 2017-09-05 RX ADMIN — POLYETHYLENE GLYCOL 3350 17 G: 17 POWDER, FOR SOLUTION ORAL at 19:00

## 2017-09-05 RX ADMIN — ATORVASTATIN CALCIUM 40 MG: 40 TABLET, FILM COATED ORAL at 21:00

## 2017-09-05 RX ADMIN — ZINC SULFATE 220 MG (50 MG) CAPSULE 220 MG: CAPSULE at 08:03

## 2017-09-05 RX ADMIN — HYDROMORPHONE HYDROCHLORIDE 4 MG: 2 TABLET ORAL at 03:11

## 2017-09-05 RX ADMIN — Medication 325 MG: at 08:04

## 2017-09-05 RX ADMIN — METOPROLOL TARTRATE 25 MG: 25 TABLET, FILM COATED ORAL at 08:04

## 2017-09-05 RX ADMIN — GABAPENTIN 300 MG: 300 CAPSULE ORAL at 14:16

## 2017-09-05 RX ADMIN — AMPICILLIN AND SULBACTAM 3 G: 1; 2 INJECTION, POWDER, FOR SOLUTION INTRAMUSCULAR; INTRAVENOUS at 14:16

## 2017-09-05 RX ADMIN — CYANOCOBALAMIN TAB 1000 MCG 1000 MCG: 1000 TAB at 08:03

## 2017-09-05 RX ADMIN — VITAMIN D, TAB 1000IU (100/BT) 2000 UNITS: 25 TAB at 08:03

## 2017-09-05 RX ADMIN — Medication 1 CAPSULE: at 08:04

## 2017-09-05 RX ADMIN — Medication 250 MG: at 08:04

## 2017-09-05 RX ADMIN — AMPICILLIN AND SULBACTAM 3 G: 1; 2 INJECTION, POWDER, FOR SOLUTION INTRAMUSCULAR; INTRAVENOUS at 21:05

## 2017-09-05 RX ADMIN — LOSARTAN POTASSIUM 100 MG: 50 TABLET ORAL at 06:17

## 2017-09-05 RX ADMIN — AMPICILLIN AND SULBACTAM 3 G: 1; 2 INJECTION, POWDER, FOR SOLUTION INTRAMUSCULAR; INTRAVENOUS at 08:04

## 2017-09-05 RX ADMIN — METOPROLOL TARTRATE 25 MG: 25 TABLET, FILM COATED ORAL at 21:00

## 2017-09-05 RX ADMIN — POTASSIUM CHLORIDE 20 MEQ: 1.5 POWDER, FOR SOLUTION ORAL at 08:03

## 2017-09-05 RX ADMIN — AMIODARONE HYDROCHLORIDE 200 MG: 200 TABLET ORAL at 08:04

## 2017-09-05 RX ADMIN — FUROSEMIDE 20 MG: 20 TABLET ORAL at 08:03

## 2017-09-05 RX ADMIN — GABAPENTIN 300 MG: 300 CAPSULE ORAL at 06:17

## 2017-09-05 RX ADMIN — DOCUSATE SODIUM 100 MG: 100 CAPSULE, LIQUID FILLED ORAL at 08:02

## 2017-09-05 RX ADMIN — Medication 400 MG: at 08:04

## 2017-09-05 RX ADMIN — AMPICILLIN AND SULBACTAM 3 G: 1; 2 INJECTION, POWDER, FOR SOLUTION INTRAMUSCULAR; INTRAVENOUS at 03:00

## 2017-09-05 RX ADMIN — HYDROMORPHONE HYDROCHLORIDE 4 MG: 2 TABLET ORAL at 17:24

## 2017-09-05 RX ADMIN — GABAPENTIN 300 MG: 300 CAPSULE ORAL at 21:52

## 2017-09-05 RX ADMIN — DOCUSATE SODIUM 100 MG: 100 CAPSULE, LIQUID FILLED ORAL at 17:24

## 2017-09-05 RX ADMIN — HYDROMORPHONE HYDROCHLORIDE 4 MG: 2 TABLET ORAL at 21:49

## 2017-09-05 NOTE — PROGRESS NOTES
Pt interaction:    Paged vascular for treatment parameters clarification. Dr. Lang Laguna attends to pt room with discussion about dependent rubor. States pt is appropriate for treatment with rest breaks as needed in position of comfort. Will continue with current POC.     Charlene Galindo PT DPT  09/05/2017

## 2017-09-05 NOTE — PROGRESS NOTES
Asked to look at dependent rubor. No cellulitis noted. Patient still with claudication of rle. Seems to be worsening. Likely with increased PT. Needs angiogram on that foot. Great toe with dry gangrene. Will attempt angio Thursday. Please hold coumadin.

## 2017-09-05 NOTE — ROUTINE PROCESS
SHIFT CHANGE NOTE FOR [facility]    Bedside and Verbal shift change report given to Gisela Chau RN (oncoming nurse) by Kranthi Honeycutt RN   (offgoing nurse). Report included the following information SBAR, Kardex, MAR and Recent Results. Situation:   Code Status: Full Code   Reason for Admission: [address] Day: 11   Problem List:   Hospital Problems  Date Reviewed: 9/5/2017          Codes Class Noted POA    Skin ulcer of malleolar area of right ankle ([facility] (Chronic) ICD-10-CM: L97.319  ICD-9-CM: 707.13  Unknown Yes        * (Principal)Status post above knee amputation of left lower extremity ([facility] ICD-10-CM: H40.718  ICD-9-CM: V49.76  8/18/2017 Yes    Overview Signed 8/21/2017 10:21 PM by Ramón Rivas MD     S/P Left above-the-knee amputation (8/18/2017 - Dr. Jayla Mondragon)               Aftercare for amputation stump ICD-10-CM: Z47.81  ICD-9-CM: V54.89  8/18/2017 Yes    Overview Signed 8/21/2017 10:25 PM by Ramón Rivas MD     S/P Left above-the-knee amputation (8/18/2017 - Dr. Jayla Mondragon)             Ischemic rest pain of lower extremity ([facility] ICD-10-CM: I73.9  ICD-9-CM: 443.9  8/14/2017 Yes    Overview Signed 8/15/2017 11:09 AM by Ramón Rivas MD     Left             Chronic ulcer of right heel ([facility] (Chronic) ICD-10-CM: L97.419  ICD-9-CM: 707.14  8/8/2017 Yes        Acute blood loss as cause of postoperative anemia ICD-10-CM: D62  ICD-9-CM: 285.1  7/25/2017 Yes        Aftercare following surgery of the circulatory system ICD-10-CM: Z48.812  ICD-9-CM: V58.73  7/25/2017 Yes    Overview Addendum 8/21/2017 10:22 PM by Ramón Rivas MD     S/P Removal of infected graft; Repair of left superficial femoral artery; Repair of left common femoral artery (7/25/2017 - Dr. Jayla Mondragon); S/P Right axillary femoral jump bypass interposition; Removal of infected right axillary femoral bypass;  Wound VAC placement of upper thigh 1.2 cm x 5.2 cm x 1.8 cm and groin 4 cm x 0.5 cm x 0.2 cm (8/18/2017 - Dr. Estelle Brannon)             Impaired mobility and ADLs ICD-10-CM: Z74.09  ICD-9-CM: 799.89  7/24/2017 Yes        Infection of vascular bypass graft [facility]) ICD-10-CM: T82. 7XXA  ICD-9-CM: 996.62  7/24/2017 Yes    Overview Signed 8/7/2017  2:19 PM by Dina Centeno MD     Left lower extremity             Anticoagulated on Coumadin (Chronic) ICD-10-CM: Z51.81, Z79.01  ICD-9-CM: V58.83, V58.61  Unknown Yes        Paroxysmal atrial fibrillation (HCC) ICD-10-CM: I48.0  ICD-9-CM: 427.31  Unknown Yes        Peripheral artery disease ([facility] [address] (Chronic) ICD-10-CM: I73.9  ICD-9-CM: 443.9  1/25/2017 Yes        Benign hypertensive heart disease with systolic congestive heart failure, NYHA class 2 (HCC) (Chronic) ICD-10-CM: I11.0, I50.20  ICD-9-CM: 402.11, 428.20, 428.0  1/26/2015 Yes              Background:   Past Medical History:   Past Medical History:   Diagnosis Date    Acute blood loss as cause of postoperative anemia 4/4/2017    Anticoagulated on Coumadin     Aortoiliac occlusive disease ([address] 1/25/2017    Atherosclerosis of native artery of both lower extremities with intermittent claudication ([address] 1/25/2017    Benign hypertensive heart disease with systolic congestive heart failure, NYHA class 2 ([address] 1/26/2015    Carotid artery disease (HCC)     Chronic anemia     Chronic systolic heart failure (HCC)     Chronic ulcer of right foot ([address] 4/4/2017    Chronic ulcer of right heel ([address] 8/8/2017    Coronary artery disease involving native coronary artery of native heart 3/15/2017    Successful stenting of Cx (Xience LESLEY) and RCA (Xience LESLEY) to 0% by Dr. Catherine Nye on 3/15/17.     DDD (degenerative disc disease), lumbar 1/26/2015    Dyslipidemia     Erectile dysfunction 7/5/2016    Euthyroid sick syndrome 4/6/2017    Hereditary peripheral neuropathy 11/15/2016    History of cardioversion 4/18/2017    S/P Synchronized external cardioversion (4/18/2017 - Dr. Anay Miller)    History of vitamin D deficiency 4/22/2017    Vitamin D 25-Hydroxy (4/22/2017) = 12.0; Vitamin D 25-Hydroxy (5/26/2017) = 38.7    Infection of vascular bypass graft ([facility] 7/24/2017    Left lower extremity    Ischemic cardiomyopathy     Moderate to severe pulmonary hypertension ([facility] 7/23/2017    Paroxysmal atrial fibrillation (HCC)     Peripheral artery disease ([facility] 1/25/2017    Skin ulcer of malleolar area of right ankle ([facility]     Spinal stenosis of lumbar region with radiculopathy 5/4/2015    Dr. Di Nugent      Patient taking anticoagulants no    Patient has a defibrillator: no     Assessment:   Changes in Assessment throughout shift: no     Patient has central line: yes Reasons if yes: lone term antibiotic  Insertion date:08/24/17 Last dressing date:09/05/17   Patient has Jiang Cath: no Reasons if yes:     Insertion date:     Last Vitals:     Vitals:    09/04/17 2114 09/05/17 0617 09/05/17 1019 09/05/17 1552   BP: 129/67 124/62 129/58 114/60   Pulse: 68 60 60 66   Resp:   18 18   Temp:   97.2 °F (36.2 °C) 98 °F (36.7 °C)   SpO2:   100% 99%   Weight:       Height:            PAIN    Pain Assessment    Pain Intensity 1: 4 (09/05/17 1810) Pain Intensity 1: 2 (12/29/14 1105)    Pain Location 1: Foot Pain Location 1: Abdomen    Pain Intervention(s) 1: Medication (see MAR) Pain Intervention(s) 1: Medication (see MAR)  Patient Stated Pain Goal: 4 Patient Stated Pain Goal: 0  o Intervention effective: no   o Other actions taken for pain: pain medicine     Skin Assessment  Skin color Skin Color: Appropriate for ethnicity  Condition/Temperature Skin Condition/Temp: Dry, Warm  Integrity Skin Integrity: Incision (comment)  Turgor Turgor: Non-tenting  Weekly Pressure Ulcer Documentation  Pressure  Injury Documentation: No Pressure Injury Noted-Pressure Ulcer Prevention Initiated  Wound Prevention & Protection Methods  Orientation of wound Orientation of Wound Prevention: Posterior  Location of Prevention Location of Wound Prevention: Sacrum/Coccyx  Dressing Present Dressing Present : No  Dressing Status Dressing Status: Intact  Wound Offloading Wound Offloading (Prevention Methods): Bed, pressure redistribution/air, Chair cushion, Wheelchair     INTAKE/OUPUT    Date 09/04/17 1900 - 09/05/17 0659 09/05/17 0700 - 09/06/17 0659   Shift 6375-9641 24 Hour Total 3278-7048 8190-3649 24 Hour Total   I  N  T  A  K  E   P.O.  480 720  720      P. O.  480 720  720    Shift Total  (mL/kg)  480  (6.8) 720  (10.2)  720  (10.2)   O  U  T  P  U  T   Urine  (mL/kg/hr) 3600 5590 750  (0.9)  750      Urine Voided 3600 5590 750  750      Urine Occurrence(s) 8 x 17 x 3 x  3 x    Stool           Stool Occurrence(s) 1 x 3 x 1 x  1 x    Shift Total  (mL/kg) 3600  (50.8) 5590  (79) 750  (10.6)  750  (10.6)   NET -3600 -5110 -30  -30   Weight (kg) 70.8 70.8 70.8 70.8 70.8       Recommendations:  1. Patient needs and requests: pain medicine    2. Diet: cardiac    3. Pending tests/procedures: no     4. Functional Level/Equipment: 1 x person assist    5. Estimated Discharge Date: TDB Posted on Whiteboard in Patients Room: [facility] Safety Check    A safety check occurred in the patient's room between off going nurse and oncoming nurse listed above. The safety check included the below items  Area Items   H  High Alert Medications - Verify all high alert medication drips (heparin, PCA, etc.)   E  Equipment - Suction is set up for ALL patients (with yanker)  - Red plugs utilized for all equipment (IV pumps, etc.)  - WOWs wiped down at end of shift.  - Room stocked with oxygen, suction, and other unit-specific supplies   A  Alarms - Bed alarm is set for fall risk patients  - Ensure chair alarm is in place and activated if patient is up in a chair   L  Lines - Check IV for any infiltration  - Jiang bag is empty if patient has a Jiang   - Tubing and IV bags are labeled   S  Safety   - Room is clean, patient is clean, and equipment is clean.   - Hallways are clear from equipment besides carts. - Fall bracelet on for fall risk patients  - Ensure room is clear and free of clutter  - Suction is set up for ALL patients (with cailin)  - Hallways are clear from equipment besides carts.    - Isolation precautions followed, supplies available outside room, sign posted

## 2017-09-05 NOTE — PROGRESS NOTES
Problem: Mobility Impaired (Adult and Pediatric)  Goal: *Acute Goals and Plan of Care (Insert Text)  Physical Therapy Short Term Goals  Initiated 8/26/2017 and to be accomplished within 7 day(s) 09/02/2017  1. Patient will move from supine to sit and sit to supine , scoot up and down and roll side to side in bed with independence. 2. Patient will transfer from bed to chair and chair to bed with supervision/set-up using the least restrictive device. 3. Patient will perform sit to stand with supervision/set-up. 4. Patient will ambulate with supervision/set-up for 50 feet with the least restrictive device. 5. Patient will ascend/descend 3 stairs with 2 handrail(s) with minimal assistance/contact guard assist.    Physical Therapy Long Term Goals  Initiated 8/26/2017 and to be accomplished within 14 day(s) 09/09/2017  1. Patient will move from supine to sit and sit to supine , scoot up and down and roll side to side in bed with independence. 2. Patient will transfer from bed to chair and chair to bed with modified independence using the least restrictive device. 3. Patient will perform sit to stand with modified independence. 4. Patient will ambulate with modified independence for 50 feet with the least restrictive device. 5. Patient will ascend/descend 3 stairs with 2 handrail(s) with supervision/set-up. PT WEEKLY PROGRESS NOTE  Patient Name:Mj Allen   Time In: 3877   Time Out: 1215     Subjective: Patient present supine in bed with R LE elevated on pillows voicing encouragement to participate in therapy session following d/w Dr. Ky Taylor.              Objective:   Patient performs full functional assessment as noted below in addition to the following therapeutic exercise:     Extremity: Both  Exercise Type #1: Other (comment) (SL glut bridge R; SL glut bridge L w/ bolster)  Sets Performed: 2  Reps Performed: 10  Level of Assist: Supervision  Exercise Type #2: Other (comment) (S/L ABD B )  Sets Performed: 3  Reps Performed:  (12)  Level of Assist: Supervision  Exercise Type #3: Other (comment) (prone on elbows)  Sets Performed: 2  Reps Performed: 5 (5 min)  Level of Assist: Supervision     Outcome Measures: FIM/MMT  Pain at start of tx:5-6/10  Pain at stop of tx:7-8/10     Patient identified with name and : YES                    AROM: WFL      FIM SCORES Initial Assessment Weekly Progress Assessment 2017   Bed/Chair/Wheelchair Transfers 4 5   Wheelchair Mobility 6 6   Walking [place] 1 1   Steps/Stairs 0 2   Please see IRC Interdisciplinary Eval: Coordination/Balance Section for details regarding FIM score description. BED/CHAIR/WHEELCHAIR TRANSFERS Initial Assessment Weekly Progress Assessment 2017   Rolling Right 7 (Independent) 7 (Independent)   Rolling Left 7 (Independent) 7 (Independent)   Supine to Sit 7 (Independent) 7 (Independent)   Sit to Stand Contact guard assistance Supervision   Sit to Supine 7 (Independent) 7 (Independent)   Transfer Type Other Other   Comments Pt transfers sit<->stand and stand-step with RW with CGA/SBA for balance. Pt is independent with bed mobility without rails Patient performs stand hop transfer with RW x 8 in session with SPV/set-up for wound vac management. Patient with appropriate pace and technique, however he demonstrates difficulty with transition of hands to RW from w/c. Patient demonstrates reduced pace this session 2/2 R LE pain with loading.    Car Transfer Not tested Supervision   Car Type n/a Car simulator       GROSS ASSESSMENT Weekly Progress Assessment 2017   AROM Within functional limits   Strength Within functional limits   Coordination Within functional limits   Tone Normal   Sensation Intact   PROM Within functional limits       POSTURE Weekly Progress Assessment 2017   Posture (WDL) Exceptions to WDL   Posture Assessment Forward head;Rounded shoulders;Trunk flexion       WHEELCHAIR MOBILITY/MANAGEMENT Initial Assessment Weekly Progress Assessment 9/5/2017   Able to Propel 250 feet 300 feet   Curbs/ramps assistance required 6 (Modified independent) 6 (Modified independent)   Wheelchair set up assistance required 6 (Modified independent) Mod I   Wheelchair management Manages right brake, Manages left brake Manages left brake;Manages right brake;Manages right footrest       WALKING INDEPENDENCE Initial Assessment Weekly Progress Assessment 9/5/2017   Assistive device Walker, rolling Walker, rolling   Ambulation assistance - level surface CGA CGA   Distance 40 Feet (ft) (x2 reps) 40 Feet (ft) (x 3 trials)   Comments Pt ambulates 40ft with hop-to pattern with decreased step height and length, decreased balacne and decreased activity tolerance. Pt c/o pain in LLE with dependent positioning of ambulation 40 ft stand hop w/ RW and set-up for wound vac management. Path deviations, step clearance, antalgia, and slow pace noted       GAIT Weekly Progress Assessment 9/5/2017   Gait Description (WDL) Exceptions to WDL   Gait Abnormalities Antalgic;Decreased step clearance; Path deviations       STEPS/STAIRS Initial Assessment Weekly Progress Assessment 9/5/2017   Steps/Stairs ambulated  (3 4 inch) 6   Rail Use Both Right  (with L UE utilizing crutch)   Comments NT 6 steps with R HR utilizing axillary crutch in L UE. Patietn with intermittent LOB requiring min/mod a x 1 for trunk stability. Curbs/Ramps Unable NT              Assessment: Patient has achieved all STG's with the exception of STG 4 2/2 increased pain with R LE loading limiting distance. Patient will require continued skilled PT intervention to address limited mobility and improve tolerance for mobility while implementing accomodation for vascular deficits. Plan of Care: Please see Care Plan for updated LTGs. Family Training: TBD     Elsa Talbert.  Michelle, PT DPT  9/5/2017

## 2017-09-05 NOTE — PROGRESS NOTES
Problem: Self Care Deficits Care Plan (Adult)  Goal: *Therapy Goal (Edit Goal, Insert Text)  Long Term Goals (to be met upon discharge date) in order to increase pts functional independence and safety, and decrease burden of care:  1. Pt will perform self-feeding with independence. 2. Pt will perform grooming with independent. 3. Pt will perform UB bathing with independence. 4. Pt will perform LB bathing with Mod I.  5. Pt will perform tub/shower transfer with Mod I.   6. Pt will perform UB dressing with independence. 7. Pt will perform LB dressing with Mod Iindependence. 8. Pt will perform toileting task with Mod independence. 9. Pt will perform toilet transfer with Mod I. Short Term Weekly Goals for (17 to 17) in order to increase pts functional independence and safety, and decrease burden of care:  3. Pt will perform UB bathing with independence. 4. Pt will perform LB bathing with Supervision. 5. Pt will perform tub/shower transfer with supervision. 6. Pt will perform UB dressing with Supervision. 7. Pt will perform LB dressing with Supervision. 8. Pt will perform toileting task with Supervision. 9. Pt will perform toilet transfer with Supervision. OCCUPATIONAL THERAPY DAILY NOTE  Patient Name:Mj Guillen  Time Spent With Patient  Time In: 6796  Time Out: 26        Medical Diagnosis:  Left AKA  Status post above knee amputation of left lower extremity ([facility] [address] Status post above knee amputation of left lower extremity (HCC)      Pain at start of tx:0/10  Pain at stop of tx:0/10     Patient identified with name and : yes  Subjective: Pt denies having pain     Objective: Pt seen for afternoon shower.          FEEDING/EATING Daily Assessment     Feeding/Eating  Feeding/Eating Assistance: 6 (Modified independent)          UPPER BODY BATHING Daily Assessment     Upper Body Bathing  Bathing Assistance, Upper: 6 (Modified independent)  Position Performed: Seated in chair       LOWER BODY BATHING Daily Assessment     Lower Body Bathing  Bathing Assistance, Lower : 6 (Modified independent)  Position Performed: Seated in chair  Comments: Pt weight shift on shower washing buttocks           UPPER BODY DRESSING Daily Assessment     Upper Body Dressing   Dressing Assistance : 7 (Independent)  Comments: From wheel chair level       LOWER BODY DRESSING Daily Assessment     Lower Body Dressing   Dressing Assistance : 4 (Minimal assistance)  Leg Crossed Method Used: No  Position Performed: Seated in chair;Standing  Comments: Pt disconnect wound vac donning pants to thigh and reconnection wound vac. pt weight bear on B's UE to have pants over hips dependent then donning sock. MOBILITY/TRANSFERS Daily Assessment     Functional Transfers  Tub or Shower Type: Shower  Amount of Assistance Required: 4 (Contact guard assistance)  Adaptive Equipment: Grab bars      Assessment: Pt is mod independent with self care at wheel chair level. Pt demonstrated independency with wound vac for management. Plan of Care: con't plan of care      Tana TATE  9/5/2017  .

## 2017-09-05 NOTE — PROGRESS NOTES
[address]  [address] [place] [address], [address]     INPATIENT REHABILITATION  DAILY PROGRESS NOTE     Date: 9/5/2017    Name: Garth Mills Age / Sex: 59 y.o. / male   CSN: [id number] MRN: [id number]   [facility] Date: 8/25/2017 Length of Stay: 11 days     Primary Rehab Diagnosis: Impaired Mobility and ADLs secondary to:  1. S/P Left above-the-knee amputation (8/18/2017 - Dr. Tres Jang)  3. History of ischemic rest pain of the left lower extremity due to severe peripheral vascular disease       Subjective:     Patient seen and examined. Blood pressure controlled. Patient's Complaint:   No significant medical complaints    Pain Control: stable, mild-to-moderate joint symptoms intermittently, reasonably well controlled by current meds      Objective:     Vital Signs:  Patient Vitals for the past 24 hrs:   BP Temp Pulse Resp SpO2   09/05/17 1552 114/60 98 °F (36.7 °C) 66 18 99 %   09/05/17 1019 129/58 97.2 °F (36.2 °C) 60 18 100 %   09/05/17 0617 124/62 - 60 - -   09/04/17 2114 129/67 - 68 - -   09/04/17 1916 124/70 98.6 °F (37 °C) 68 20 100 %        Physical Examination:  GENERAL SURVEY: Patient is awake, alert, oriented x 3, sitting comfortably on the chair, not in acute respiratory distress.   HEENT: pale palpebral conjunctivae, anicteric sclerae, no nasoaural discharge, moist oral mucosa  NECK: supple, no jugular venous distention, no palpable lymph nodes  CHEST/LUNGS: symmetrical chest expansion, good air entry, clear breath sounds  HEART: adynamic precordium, good S1 S2, no S3, regular rhythm, no murmurs  ABDOMEN: flat, bowel sounds appreciated, soft, non-tender  EXTREMITIES: (+) left AKA stump with dressing, (+) WoundVac on left medial thigh, (+) ~1 cm x ~1 cm ulcer on right heel, (+) 1.5 cm x 1.5 cm wound above lateral malleolus of right ankle, pale nailbeds, no edema, full and equal pulses, no calf tenderness   NEUROLOGICAL EXAM: The patient is awake, alert and oriented x3, able to answer questions fairly appropriately, able to follow 1 and 2 step commands. Able to tell time from the wall clock. Cranial nerves II-XII are grossly intact. No gross sensory deficit.   Motor strength is 4+/5 on BUE, 4+/5 on the RLE, 3+/5 on the left hip, 4-/5 on the left knee and left ankle.     Incision(s): healing well, clean, dry, and intact       Current Medications:  Current Facility-Administered Medications   Medication Dose Route Frequency    alteplase (CATHFLO) 1 mg in sterile water (preservative free) 1 mL injection  1 mg InterCATHeter PRN    losartan (COZAAR) tablet 100 mg  100 mg Oral DAILY    acetaminophen (TYLENOL) tablet 650 mg  650 mg Oral Q4H PRN    docusate sodium (COLACE) capsule 100 mg  100 mg Oral BID    bisacodyl (DULCOLAX) tablet 10 mg  10 mg Oral Q48H PRN    lactobacillus sp. 50 billion cpu (BIO-K PLUS) capsule 1 Cap  1 Cap Oral DAILY    magnesium oxide (MAG-OX) tablet 400 mg  400 mg Oral DAILY    gabapentin (NEURONTIN) capsule 300 mg  300 mg Oral Q8H    furosemide (LASIX) tablet 20 mg  20 mg Oral DAILY    HYDROmorphone (DILAUDID) tablet 2-4 mg  2-4 mg Oral Q4H PRN    potassium chloride (KLOR-CON) packet 20 mEq  20 mEq Oral DAILY    polyethylene glycol (MIRALAX) packet 17 g  17 g Oral DAILY    amiodarone (CORDARONE) tablet 200 mg  200 mg Oral DAILY    aspirin chewable tablet 81 mg  81 mg Oral DAILY WITH BREAKFAST    cholecalciferol (VITAMIN D3) tablet 2,000 Units  2,000 Units Oral DAILY    ferrous sulfate tablet 325 mg  1 Tab Oral DAILY WITH BREAKFAST    ascorbic acid (vitamin C) (VITAMIN C) tablet 250 mg  250 mg Oral DAILY WITH BREAKFAST    zinc sulfate (ZINCATE) capsule 220 mg  1 Cap Oral DAILY    atorvastatin (LIPITOR) tablet 40 mg  40 mg Oral QHS    metoprolol tartrate (LOPRESSOR) tablet 25 mg  25 mg Oral Q12H    cyanocobalamin tablet 1,000 mcg  1,000 mcg Oral DAILY    ampicillin-sulbactam (UNASYN) 3 g in 0.9% sodium chloride (MBP/ADV) 100 mL MBP  3 g IntraVENous Q6H       Allergies: Allergies   Allergen Reactions    Morphine Other (comments)     Patient gets confused with morphine. Functional Progress:    OCCUPATIONAL THERAPY    ON ADMISSION MOST RECENT   Eating  Functional Level: 6   Eating  Functional Level: 6     Grooming  Functional Level: 7   Grooming  Functional Level: 7     Bathing  Functional Level: 4   Bathing  Functional Level: 4     Upper Body Dressing  Functional Level: 7   Upper Body Dressing  Functional Level: 7     Lower Body Dressing  Functional Level: 4   Lower Body Dressing  Functional Level: 4     Toileting  Functional Level: 3   Toileting  Functional Level: 5     Toilet Transfers  Toilet Transfer Score: 4   Toilet Transfers  Toilet Transfer Score: 5     Tub /Shower Transfers  Tub/Shower Transfer Score: 4   Tub/Shower Transfers  Tub/Shower Transfer Score: 4       Legend:   7 - Independent   6 - Modified Independent   5 - Standby Assistance / Supervision / Set-up   4 - Minimum Assistance / Contact Guard Assistance   3 - Moderate Assistance   2 - Maximum Assistance   1 - Total Assistance / Dependent       Lab/Data Review:  Recent Results (from the past 24 hour(s))   PROTHROMBIN TIME + INR    Collection Time: 09/05/17  6:30 AM   Result Value Ref Range    Prothrombin time 25.0 (H) 11.5 - 15.2 sec    INR 2.4 (H) 0.8 - 1.2         Estimated Glomerular Filtration Rate:  On admission, estimated GFR based on a Creatinine of 0.62 mg/dl:   Using CKD-EPI = 108.6 mL/min/1.73m2   Using MDRD = 141.1 mL/min/1.73m2  Most recent estimated GFR, based on a Creatinine of 0.73 mg/dl on 9/4/2017:   Using CKD-EPI = 101.5 mL/min/1.73m2   Using MDRD = 116.9 mL/min/1.73m2       Assessment:     Primary Rehabilitation Diagnosis  1. Impaired Mobility and ADLs  2. S/P Left above-the-knee amputation (8/18/2017 - Dr. Tatiana White)  3.  History of ischemic rest pain of the left lower extremity due to severe peripheral vascular disease      Comorbidities   Benign hypertensive heart disease with systolic congestive heart failure, NYHA class 2  I11.0, I50.20    DDD (degenerative disc disease), lumbar M51.36    Erectile dysfunction N52.9    Spinal stenosis of lumbar region with radiculopathy M48.06, M54.16    Hereditary peripheral neuropathy G60.9    Aortoiliac occlusive disease  I74.09    Atherosclerosis of native artery of both lower extremities with intermittent claudication  I70.213    Peripheral artery disease  I73.9    Coronary artery disease involving native coronary artery of native heart I25.10    Paroxysmal atrial fibrillation  I48.0    Acute blood loss as cause of postoperative anemia D62    Anticoagulated on Coumadin Z51.81, Z79.01    Ischemic cardiomyopathy I25.5    Chronic systolic heart failure  U89.11    Carotid artery disease  I77.9    Dyslipidemia E78.5    Euthyroid sick syndrome E07.81    Aftercare following surgery of the circulatory system Z48.812    Status post femoral-popliteal bypass surgery Z95.828    History of cardioversion Z98.890    Critical ischemia of right lower extremity I99.8    History of vitamin D deficiency Z86.39    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    Infection of vascular bypass graft  T82. 7XXA    Chronic anemia D64.9    Chronic ulcer of right heel (HCC) L97.419    Prolonged Q-T interval on ECG R94.31    Aftercare for amputation stump Z47.81    Moderate to severe pulmonary hypertension  I27.2    Adverse effect of amiodarone T46.2X5A    Skin ulcer of malleolar area of right ankle  L97.319         Plan:     1. Justification for continued stay: Good progression towards established rehabilitation goals. 2. Medical Issues being followed closely:    [x]  Fall and safety precautions     [x]  Wound Care     [x]  Bowel and Bladder Function     [x]  Fluid Electrolyte and Nutrition Balance     [x]  Pain Control      3.  Issues that 24 hour rehabilitation nursing is following:    [x]  Fall and safety precautions     [x]  Wound Care     [x]  Bowel and Bladder Function     [x]  Fluid Electrolyte and Nutrition Balance     [x]  Pain Control      [x]  Assistance with and education on in-room safety with transfers to and from the bed, wheelchair, toilet and shower. 4. Acute rehabilitation plan of care:    [x]  Continue current care and rehab. [x]  Physical Therapy           [x]  Occupational Therapy           []  Speech Therapy     []  Hold Rehab until further notice     5. Medications:    [x]  MAR Reviewed     [x]  Continue Present Medications     6. DVT Prophylaxis:      []  Lovenox     []  Unfractionated Heparin     [x]  Coumadin     []  NOAC     []  VASQUEZ Stockings     []  Sequential Compression Device     []  None     7. Orders:   > S/P Left above-the-knee amputation (8/18/2017 - Dr. Ana M Suazo); History of ischemic rest pain of the left lower extremity due to severe peripheral artery disease   > Wound vac dressing changes by staff nurses every Monday, Wednesday, & Friday on day shift. Measure wound size & document with each dressing change. Settings: 125mmHG low continuous suction. Measure wound size & document with each dressing change. Change canister when 3/4 full. May use saline dressings daily until wound vac initiated.   > Mepilex Border dressings to left AKA staple line q2days & prn soilage. Wrap with ace wrap in figure 8 form.   > Staples to be removed on 9/15/2017    > S/P Removal of infected graft; Repair of left superficial femoral artery; Repair of left common femoral artery (7/25/2017 - Dr. Ana M Suazo);  History of sepsis associated with infection of vascular bypass graft   > Continue Ampicillin Sulbactam 3 grams IVPB q 6 hr (STOP DATE: 9/19/2017)    > Acute Postoperative Blood Loss Anemia   > Hgb/Hct (8/24/2017, prior to admission to the ARU) = 8.4/25.6   > Anemia work-up (8/22/2017) showed serum iron 15, TIBC 146, iron % saturation 10   > Hgb/Hct (8/26/2017, on admission) = 8.6/26.5   > Reticulocyte count (8/26/2017) = 4.7   > Ferritin (8/26/2017) = 1066   > Hgb/Hct (8/28/2017) = 9.0/27.9    > Hgb/Hct (8/31/2017) = 8.4/26.4   > Hgb/Hct (9/3/2017) = 8.6/26.4    > Hgb/Hct (9/4/2017) = 8.7/27.5    > Continue:    > FeSO4 325 mg PO once daily with breakfast     > Ascorbic Acid 250 mg PO once daily with breakfast (to enhance the absorption of the FeSO4)     > Benign hypertensive heart disease with chronic systolic heart failure   > On 8/26/2017, increased Losartan from 50 mg to 100 mg PO once daily (6AM)   > Continue:    > Furosemide 20 mg PO once daily (6AM)    > Losartan 100 mg PO once daily (6AM)    > Metoprolol tartrate 25 mg PO q 12 hr (9AM, 9PM)    > Paroxysmal atrial fibrillation, presently normal sinus rhythm, anticoagulated on Coumadin   > Continue:    > Amiodarone 200 mg PO once daily    > Metoprolol tartrate 25 mg PO q 12 hr (9AM, 9PM)   > Target INR = 2 to 3   > INR 2.4   > Patient received Coumadin 2.5 mg PO last night   > Hold Coumadin as per Vascular Surgery    > Chronic ulcer of right heel    > Apply rigid heel suspension boots on both feet when supine in bed   > Podiatry follow-up (Dr. Javier Levy) called by Vascular Surgery for comanagement   > Arterial duplex ultrasound (8/29/2017) showed patent common femoral to popliteal artery bypass graft without evidence of a hemodynamically significant stenosis. > For angiogram of the right lower extremity on Thursday   > Continue:    > Ascorbic acid 250 mg PO once daily with breakfast    > Zinc sulfate 220 mg PO once daily    > Skin ulcer of malleolar area of right ankle   > Cleanse wound with saline, apply Aquacel ag dressing, Mepilex Border to right lateral ankle wound every 48hrs & prn soilage. > Analgesia   > Continue:    > Acetaminophen 650 mg PO q 4 hr PRN for pain level less than 5/10    > Hydromorphone 2-4 mg PO q 4 hr PRN for pain level greater than 4/10      8.  Patient's progress in rehabilitation and medical issues discussed with the patient. All questions answered to the best of my ability. Care plan discussed with patient and nurse.       Signed:    Nancy Rashid MD    September 5, 2017

## 2017-09-05 NOTE — ROUTINE PROCESS
SHIFT CHANGE NOTE FOR [facility]    Bedside and Verbal shift change report given to Christiane Richards, RN (oncoming nurse) by Esperanza Davison RN   (offgoing nurse). Report included the following information SBAR, Kardex, MAR and Recent Results. Situation:   Code Status: Full Code   Reason for Admission: left AKA  Hospital Day: 11   Problem List:   Hospital Problems  Date Reviewed: 9/1/2017          Codes Class Noted POA    Skin ulcer of malleolar area of right ankle ([address] (Chronic) ICD-10-CM: L97.319  ICD-9-CM: 707.13  Unknown Yes        * (Principal)Status post above knee amputation of left lower extremity ([address] ICD-10-CM: M77.100  ICD-9-CM: V49.76  8/18/2017 Yes    Overview Signed 8/21/2017 10:21 PM by Zachery Atkins MD     S/P Left above-the-knee amputation (8/18/2017 - Dr. Kody Daugherty)               Aftercare for amputation stump ICD-10-CM: Z47.81  ICD-9-CM: V54.89  8/18/2017 Yes    Overview Signed 8/21/2017 10:25 PM by Zachery Atkins MD     S/P Left above-the-knee amputation (8/18/2017 - Dr. Kody Daugherty)             Ischemic rest pain of lower extremity ([address] ICD-10-CM: I73.9  ICD-9-CM: 443.9  8/14/2017 Yes    Overview Signed 8/15/2017 11:09 AM by Zachery Atkins MD     Left             Chronic ulcer of right heel ([facility] (Chronic) ICD-10-CM: L97.419  ICD-9-CM: 707.14  8/8/2017 Yes        Acute blood loss as cause of postoperative anemia ICD-10-CM: D62  ICD-9-CM: 285.1  7/25/2017 Yes        Aftercare following surgery of the circulatory system ICD-10-CM: Z48.812  ICD-9-CM: V58.73  7/25/2017 Yes    Overview Addendum 8/21/2017 10:22 PM by Zachery Atkins MD     S/P Removal of infected graft; Repair of left superficial femoral artery; Repair of left common femoral artery (7/25/2017 - Dr. Kody Daugherty); S/P Right axillary femoral jump bypass interposition; Removal of infected right axillary femoral bypass;  Wound VAC placement of upper thigh 1.2 cm x 5.2 cm x 1.8 cm and groin 4 cm x 0.5 cm x 0.2 cm (8/18/2017 - Dr. Willow Wong)             Impaired mobility and ADLs ICD-10-CM: Z74.09  ICD-9-CM: 799.89  7/24/2017 Yes        Infection of vascular bypass graft [facility]) ICD-10-CM: T82. 7XXA  ICD-9-CM: 996.62  7/24/2017 Yes    Overview Signed 8/7/2017  2:19 PM by Genie Negron MD     Left lower extremity             Anticoagulated on Coumadin (Chronic) ICD-10-CM: Z51.81, Z79.01  ICD-9-CM: V58.83, V58.61  Unknown Yes        Paroxysmal atrial fibrillation (HCC) ICD-10-CM: I48.0  ICD-9-CM: 427.31  Unknown Yes        Peripheral artery disease ([facility] (Chronic) ICD-10-CM: I73.9  ICD-9-CM: 443.9  1/25/2017 Yes        Benign hypertensive heart disease with systolic congestive heart failure, NYHA class 2 (HCC) (Chronic) ICD-10-CM: I11.0, I50.20  ICD-9-CM: 402.11, 428.20, 428.0  1/26/2015 Yes              Background:   Past Medical History:   Past Medical History:   Diagnosis Date    Acute blood loss as cause of postoperative anemia 4/4/2017    Anticoagulated on Coumadin     Aortoiliac occlusive disease ([address] 1/25/2017    Atherosclerosis of native artery of both lower extremities with intermittent claudication ([address] 1/25/2017    Benign hypertensive heart disease with systolic congestive heart failure, NYHA class 2 ([address] 1/26/2015    Carotid artery disease (HCC)     Chronic anemia     Chronic systolic heart failure (HCC)     Chronic ulcer of right foot ([address] 4/4/2017    Chronic ulcer of right heel ([address] 8/8/2017    Coronary artery disease involving native coronary artery of native heart 3/15/2017    Successful stenting of Cx (Xience LESLEY) and RCA (Xience LESLEY) to 0% by Dr. Meron Carr on 3/15/17.     DDD (degenerative disc disease), lumbar 1/26/2015    Dyslipidemia     Erectile dysfunction 7/5/2016    Euthyroid sick syndrome 4/6/2017    Hereditary peripheral neuropathy 11/15/2016    History of cardioversion 4/18/2017    S/P Synchronized external cardioversion (4/18/2017 - Dr. Christiano Bahena)    History of vitamin D deficiency 4/22/2017    Vitamin D 25-Hydroxy (4/22/2017) = 12.0; Vitamin D 25-Hydroxy (5/26/2017) = 38.7    Infection of vascular bypass graft ([facility] 7/24/2017    Left lower extremity    Ischemic cardiomyopathy     Moderate to severe pulmonary hypertension ([facility] 7/23/2017    Paroxysmal atrial fibrillation (HCC)     Peripheral artery disease ([facility] 1/25/2017    Skin ulcer of malleolar area of right ankle ([facility]     Spinal stenosis of lumbar region with radiculopathy 5/4/2015    Dr. Sonido Ruiz      Patient taking anticoagulants yes   Patient has a defibrillator: no     Assessment:   Changes in Assessment throughout shift: no     Patient has central line: yes Reasons if yes: long term antibiotic  Insertion date:08/24/17 Last dressing date:08/31/17   Patient has Jiang Cath: no Reasons if yes:     Insertion date:     Last Vitals:     Vitals:    09/04/17 1600 09/04/17 1916 09/04/17 2114 09/05/17 0617   BP: 123/59 124/70 129/67 124/62   Pulse: 65 68 68 60   Resp: 18 20     Temp: 98.2 °F (36.8 °C) 98.6 °F (37 °C)     SpO2: 99% 100%     Weight:       Height:            PAIN    Pain Assessment    Pain Intensity 1: 0 (09/05/17 0400) Pain Intensity 1: 2 (12/29/14 1105)    Pain Location 1: Foot Pain Location 1: Abdomen    Pain Intervention(s) 1: Medication (see MAR) Pain Intervention(s) 1: Medication (see MAR)  Patient Stated Pain Goal: 0 Patient Stated Pain Goal: 0  o Intervention effective: no    o Other actions taken for pain: pain medicine     Skin Assessment  Skin color Skin Color: Appropriate for ethnicity  Condition/Temperature Skin Condition/Temp: Dry  Integrity Skin Integrity: Incision (comment)  Turgor Turgor: Non-tenting  Weekly Pressure Ulcer Documentation  Pressure  Injury Documentation: No Pressure Injury Noted-Pressure Ulcer Prevention Initiated  Wound Prevention & Protection Methods  Orientation of wound Orientation of Wound Prevention: Posterior  Location of Prevention Location of Wound Prevention: Sacrum/Coccyx  Dressing Present Dressing Present : No  Dressing Status Dressing Status: Intact  Wound Offloading Wound Offloading (Prevention Methods): Bed, pressure redistribution/air     INTAKE/OUPUT    Date 09/04/17 0700 - 09/05/17 0659 09/05/17 0700 - 09/06/17 0659   Shift 4896-4819 4770-8184 24 Hour Total 9899-3236 6099-8355 24 Hour Total   I  N  T  A  K  E   P.O. 480  480         P. O. 480  480       Shift Total  (mL/kg) 480  (6.8)  480  (6.8)      O  U  T  P  U  T   Urine  (mL/kg/hr) 1990  (2.3) 3600  (4.2) 5590  (3.3)         Urine Voided 1990 3600 5590         Urine Occurrence(s) 9 x 8 x 17 x       Stool            Stool Occurrence(s) 2 x 1 x 3 x       Shift Total  (mL/kg) 1990  (28.1) 3600  (50.8) 5590  (79)      NET -6407 -2478 -4727      Weight (kg) 70.8 70.8 70.8 70.8 70.8 70.8       Recommendations:  1. Patient needs and requests: pain medicine    2. Diet: cardiac    3. Pending tests/procedures: no     4. Functional Level/Equipment: 1 x person assist    5. Estimated Discharge Date: TDB Posted on Whiteboard in Patients Room: no     [facility] Safety Check    A safety check occurred in the patient's room between off going nurse and oncoming nurse listed above. The safety check included the below items  Area Items   H  High Alert Medications - Verify all high alert medication drips (heparin, PCA, etc.)   E  Equipment - Suction is set up for ALL patients (with yanker)  - Red plugs utilized for all equipment (IV pumps, etc.)  - WOWs wiped down at end of shift.  - Room stocked with oxygen, suction, and other unit-specific supplies   A  Alarms - Bed alarm is set for fall risk patients  - Ensure chair alarm is in place and activated if patient is up in a chair   L  Lines - Check IV for any infiltration  - Jiang bag is empty if patient has a Jiang   - Tubing and IV bags are labeled   S  Safety   - Room is clean, patient is clean, and equipment is clean.   - Hallways are clear from equipment besides carts.   - Fall bracelet on for fall risk patients  - Ensure room is clear and free of clutter  - Suction is set up for ALL patients (with cailin)  - Hallways are clear from equipment besides carts.    - Isolation precautions followed, supplies available outside room, sign posted

## 2017-09-06 LAB
INR PPP: 2.2 (ref 0.8–1.2)
PROTHROMBIN TIME: 23.3 SEC (ref 11.5–15.2)

## 2017-09-06 PROCEDURE — 74011250636 HC RX REV CODE- 250/636: Performed by: INTERNAL MEDICINE

## 2017-09-06 PROCEDURE — 97530 THERAPEUTIC ACTIVITIES: CPT

## 2017-09-06 PROCEDURE — 77030019934 HC DRSG VAC ASST KCON -B

## 2017-09-06 PROCEDURE — 77030012935 HC DRSG AQUACEL BMS -B

## 2017-09-06 PROCEDURE — 97110 THERAPEUTIC EXERCISES: CPT

## 2017-09-06 PROCEDURE — 65310000000 HC RM PRIVATE REHAB

## 2017-09-06 PROCEDURE — 74011000258 HC RX REV CODE- 258: Performed by: INTERNAL MEDICINE

## 2017-09-06 PROCEDURE — 97535 SELF CARE MNGMENT TRAINING: CPT

## 2017-09-06 PROCEDURE — 74011250637 HC RX REV CODE- 250/637: Performed by: INTERNAL MEDICINE

## 2017-09-06 PROCEDURE — 77030011256 HC DRSG MEPILEX <16IN NO BORD MOLN -A

## 2017-09-06 PROCEDURE — 85610 PROTHROMBIN TIME: CPT | Performed by: INTERNAL MEDICINE

## 2017-09-06 PROCEDURE — 97116 GAIT TRAINING THERAPY: CPT

## 2017-09-06 RX ORDER — LOSARTAN POTASSIUM 50 MG/1
50 TABLET ORAL DAILY
Status: DISCONTINUED | OUTPATIENT
Start: 2017-09-07 | End: 2017-09-08 | Stop reason: HOSPADM

## 2017-09-06 RX ADMIN — ZINC SULFATE 220 MG (50 MG) CAPSULE 220 MG: CAPSULE at 09:22

## 2017-09-06 RX ADMIN — GABAPENTIN 300 MG: 300 CAPSULE ORAL at 21:54

## 2017-09-06 RX ADMIN — AMPICILLIN AND SULBACTAM 3 G: 1; 2 INJECTION, POWDER, FOR SOLUTION INTRAMUSCULAR; INTRAVENOUS at 09:15

## 2017-09-06 RX ADMIN — HYDROMORPHONE HYDROCHLORIDE 4 MG: 2 TABLET ORAL at 02:56

## 2017-09-06 RX ADMIN — AMPICILLIN AND SULBACTAM 3 G: 1; 2 INJECTION, POWDER, FOR SOLUTION INTRAMUSCULAR; INTRAVENOUS at 21:54

## 2017-09-06 RX ADMIN — AMPICILLIN AND SULBACTAM 3 G: 1; 2 INJECTION, POWDER, FOR SOLUTION INTRAMUSCULAR; INTRAVENOUS at 14:59

## 2017-09-06 RX ADMIN — AMIODARONE HYDROCHLORIDE 200 MG: 200 TABLET ORAL at 09:22

## 2017-09-06 RX ADMIN — DOCUSATE SODIUM 100 MG: 100 CAPSULE, LIQUID FILLED ORAL at 17:14

## 2017-09-06 RX ADMIN — Medication 1 CAPSULE: at 09:20

## 2017-09-06 RX ADMIN — FUROSEMIDE 20 MG: 20 TABLET ORAL at 09:22

## 2017-09-06 RX ADMIN — POLYETHYLENE GLYCOL 3350 17 G: 17 POWDER, FOR SOLUTION ORAL at 18:15

## 2017-09-06 RX ADMIN — GABAPENTIN 300 MG: 300 CAPSULE ORAL at 14:59

## 2017-09-06 RX ADMIN — AMPICILLIN AND SULBACTAM 3 G: 1; 2 INJECTION, POWDER, FOR SOLUTION INTRAMUSCULAR; INTRAVENOUS at 02:57

## 2017-09-06 RX ADMIN — ASPIRIN 81 MG 81 MG: 81 TABLET ORAL at 09:22

## 2017-09-06 RX ADMIN — HYDROMORPHONE HYDROCHLORIDE 4 MG: 2 TABLET ORAL at 17:14

## 2017-09-06 RX ADMIN — Medication 250 MG: at 09:21

## 2017-09-06 RX ADMIN — HYDROMORPHONE HYDROCHLORIDE 4 MG: 2 TABLET ORAL at 09:33

## 2017-09-06 RX ADMIN — GABAPENTIN 300 MG: 300 CAPSULE ORAL at 05:48

## 2017-09-06 RX ADMIN — Medication 400 MG: at 09:21

## 2017-09-06 RX ADMIN — CYANOCOBALAMIN TAB 1000 MCG 1000 MCG: 1000 TAB at 09:21

## 2017-09-06 RX ADMIN — HYDROMORPHONE HYDROCHLORIDE 4 MG: 2 TABLET ORAL at 21:54

## 2017-09-06 RX ADMIN — VITAMIN D, TAB 1000IU (100/BT) 2000 UNITS: 25 TAB at 09:21

## 2017-09-06 RX ADMIN — DOCUSATE SODIUM 100 MG: 100 CAPSULE, LIQUID FILLED ORAL at 09:20

## 2017-09-06 RX ADMIN — Medication 325 MG: at 09:21

## 2017-09-06 RX ADMIN — POTASSIUM CHLORIDE 20 MEQ: 1.5 POWDER, FOR SOLUTION ORAL at 09:22

## 2017-09-06 RX ADMIN — METOPROLOL TARTRATE 25 MG: 25 TABLET, FILM COATED ORAL at 09:21

## 2017-09-06 RX ADMIN — ATORVASTATIN CALCIUM 40 MG: 40 TABLET, FILM COATED ORAL at 21:54

## 2017-09-06 RX ADMIN — METOPROLOL TARTRATE 25 MG: 25 TABLET, FILM COATED ORAL at 21:54

## 2017-09-06 NOTE — ROUTINE PROCESS
SHIFT CHANGE NOTE FOR [facility]    Bedside and Verbal shift change report given to Shine Triana, RN (oncoming nurse) by Annalee Lara, BEAU   (offgoing nurse). Report included the following information SBAR, Kardex, MAR and Recent Results. Situation:   Code Status: Full Code   Reason for Admission: [address] Day: 12   Problem List:   Hospital Problems  Date Reviewed: 9/5/2017          Codes Class Noted POA    Skin ulcer of malleolar area of right ankle ([facility] (Chronic) ICD-10-CM: L97.319  ICD-9-CM: 707.13  Unknown Yes        * (Principal)Status post above knee amputation of left lower extremity ([facility] ICD-10-CM: C49.053  ICD-9-CM: V49.76  8/18/2017 Yes    Overview Signed 8/21/2017 10:21 PM by Connie Ramirez MD     S/P Left above-the-knee amputation (8/18/2017 - Dr. Boby Lawler)               Aftercare for amputation stump ICD-10-CM: Z47.81  ICD-9-CM: V54.89  8/18/2017 Yes    Overview Signed 8/21/2017 10:25 PM by Connie Ramirez MD     S/P Left above-the-knee amputation (8/18/2017 - Dr. Boby Lawler)             Ischemic rest pain of lower extremity ([facility] ICD-10-CM: I73.9  ICD-9-CM: 443.9  8/14/2017 Yes    Overview Signed 8/15/2017 11:09 AM by Connie Ramirez MD     Left             Chronic ulcer of right heel ([facility] (Chronic) ICD-10-CM: L97.419  ICD-9-CM: 707.14  8/8/2017 Yes        Acute blood loss as cause of postoperative anemia ICD-10-CM: D62  ICD-9-CM: 285.1  7/25/2017 Yes        Aftercare following surgery of the circulatory system ICD-10-CM: Z48.812  ICD-9-CM: V58.73  7/25/2017 Yes    Overview Addendum 8/21/2017 10:22 PM by Connie Ramirez MD     S/P Removal of infected graft; Repair of left superficial femoral artery; Repair of left common femoral artery (7/25/2017 - Dr. Boby Lawler); S/P Right axillary femoral jump bypass interposition; Removal of infected right axillary femoral bypass;  Wound VAC placement of upper thigh 1.2 cm x 5.2 cm x 1.8 cm and groin 4 cm x 0.5 cm x 0.2 cm (8/18/2017 - Dr. Jovan Lama)             Impaired mobility and ADLs ICD-10-CM: Z74.09  ICD-9-CM: 799.89  7/24/2017 Yes        Infection of vascular bypass graft [facility]) ICD-10-CM: T82. 7XXA  ICD-9-CM: 996.62  7/24/2017 Yes    Overview Signed 8/7/2017  2:19 PM by Ouida Barthel, MD     Left lower extremity             Anticoagulated on Coumadin (Chronic) ICD-10-CM: Z51.81, Z79.01  ICD-9-CM: V58.83, V58.61  Unknown Yes        Paroxysmal atrial fibrillation (HCC) ICD-10-CM: I48.0  ICD-9-CM: 427.31  Unknown Yes        Peripheral artery disease ([facility] [facility] (Chronic) ICD-10-CM: I73.9  ICD-9-CM: 443.9  1/25/2017 Yes        Benign hypertensive heart disease with systolic congestive heart failure, NYHA class 2 (HCC) (Chronic) ICD-10-CM: I11.0, I50.20  ICD-9-CM: 402.11, 428.20, 428.0  1/26/2015 Yes              Background:   Past Medical History:   Past Medical History:   Diagnosis Date    Acute blood loss as cause of postoperative anemia 4/4/2017    Anticoagulated on Coumadin     Aortoiliac occlusive disease ([address] 1/25/2017    Atherosclerosis of native artery of both lower extremities with intermittent claudication ([address] 1/25/2017    Benign hypertensive heart disease with systolic congestive heart failure, NYHA class 2 ([address] 1/26/2015    Carotid artery disease (HCC)     Chronic anemia     Chronic systolic heart failure (HCC)     Chronic ulcer of right foot ([address] 4/4/2017    Chronic ulcer of right heel ([address] 8/8/2017    Coronary artery disease involving native coronary artery of native heart 3/15/2017    Successful stenting of Cx (Xience LESLEY) and RCA (Xience LESLEY) to 0% by Dr. Nirali Eckert on 3/15/17.     DDD (degenerative disc disease), lumbar 1/26/2015    Dyslipidemia     Erectile dysfunction 7/5/2016    Euthyroid sick syndrome 4/6/2017    Hereditary peripheral neuropathy 11/15/2016    History of cardioversion 4/18/2017    S/P Synchronized external cardioversion (4/18/2017 - Dr. Александр Moreno)    History of vitamin D deficiency 4/22/2017    Vitamin D 25-Hydroxy (4/22/2017) = 12.0; Vitamin D 25-Hydroxy (5/26/2017) = 38.7    Infection of vascular bypass graft ([facility] 7/24/2017    Left lower extremity    Ischemic cardiomyopathy     Moderate to severe pulmonary hypertension ([facility] 7/23/2017    Paroxysmal atrial fibrillation (HCC)     Peripheral artery disease ([facility] 1/25/2017    Skin ulcer of malleolar area of right ankle ([facility]     Spinal stenosis of lumbar region with radiculopathy 5/4/2015    Dr. Desirae Bojorquez      Patient taking anticoagulants no    Patient has a defibrillator: no     Assessment:   Changes in Assessment throughout shift: no     Patient has central line: yes Reasons if yes: lone term antibiotic  Insertion date:08/24/17 Last dressing date:09/05/17   Patient has Jiang Cath: no Reasons if yes:     Insertion date:     Last Vitals:     Vitals:    09/05/17 1019 09/05/17 1552 09/05/17 2100 09/06/17 0548   BP: 129/58 114/60 128/63 111/57   Pulse: 60 66 64 60   Resp: 18 18 18    Temp: 97.2 °F (36.2 °C) 98 °F (36.7 °C) 97.9 °F (36.6 °C)    SpO2: 100% 99% 99%    Weight:       Height:            PAIN    Pain Assessment    Pain Intensity 1: 0 (09/06/17 0400) Pain Intensity 1: 2 (12/29/14 1105)    Pain Location 1: Foot Pain Location 1: Abdomen    Pain Intervention(s) 1: Medication (see MAR) Pain Intervention(s) 1: Medication (see MAR)  Patient Stated Pain Goal: 0 Patient Stated Pain Goal: 0  o Intervention effective: no   o Other actions taken for pain: pain medicine     Skin Assessment  Skin color Skin Color: Appropriate for ethnicity  Condition/Temperature Skin Condition/Temp: Dry  Integrity Skin Integrity: Incision (comment)  Turgor Turgor: Non-tenting  Weekly Pressure Ulcer Documentation  Pressure  Injury Documentation: No Pressure Injury Noted-Pressure Ulcer Prevention Initiated  Wound Prevention & Protection Methods  Orientation of wound Orientation of Wound Prevention: Posterior  Location of Prevention Location of Wound Prevention: Sacrum/Coccyx  Dressing Present Dressing Present : No  Dressing Status Dressing Status: Intact  Wound Offloading Wound Offloading (Prevention Methods): Bed, pressure redistribution/air     INTAKE/OUPUT    Date 09/05/17 0700 - 09/06/17 0659 09/06/17 0700 - 09/07/17 0659   Shift 0700-1859 1900-0659 24 Hour Total 0700-1859 1900-0659 24 Hour Total   I  N  T  A  K  E   P. O. 720  720         P. O. 720  720       Shift Total  (mL/kg) 720  (10.2)  720  (10.2)      O  U  T  P  U  T   Urine  (mL/kg/hr) 750  (0.9) 3000  (3.5) 3750  (2.2)         Urine Voided 750 3000 3750         Urine Occurrence(s) 3 x 9 x 12 x       Stool            Stool Occurrence(s) 1 x 0 x 1 x       Shift Total  (mL/kg) 750  (10.6) 3000  (42.4) 3750  (53)      NET -30 -3000 -3030      Weight (kg) 70.8 70.8 70.8 70.8 70.8 70.8       Recommendations:  1. Patient needs and requests: pain medicine    2. Diet: cardiac    3. Pending tests/procedures: no     4. Functional Level/Equipment: 1 x person assist    5. Estimated Discharge Date: TDB Posted on Whiteboard in Patients Room: no     [facility] Safety Check    A safety check occurred in the patient's room between off going nurse and oncoming nurse listed above. The safety check included the below items  Area Items   H  High Alert Medications - Verify all high alert medication drips (heparin, PCA, etc.)   E  Equipment - Suction is set up for ALL patients (with yanker)  - Red plugs utilized for all equipment (IV pumps, etc.)  - WOWs wiped down at end of shift.  - Room stocked with oxygen, suction, and other unit-specific supplies   A  Alarms - Bed alarm is set for fall risk patients  - Ensure chair alarm is in place and activated if patient is up in a chair   L  Lines - Check IV for any infiltration  - Jiang bag is empty if patient has a Jiang   - Tubing and IV bags are labeled   S  Safety   - Room is clean, patient is clean, and equipment is clean.   - Hallways are clear from equipment besides carts. - Fall bracelet on for fall risk patients  - Ensure room is clear and free of clutter  - Suction is set up for ALL patients (with cailin)  - Hallways are clear from equipment besides carts.    - Isolation precautions followed, supplies available outside room, sign posted

## 2017-09-06 NOTE — PROGRESS NOTES
Problem: Self Care Deficits Care Plan (Adult)  Goal: *Therapy Goal (Edit Goal, Insert Text)  Long Term Goals (to be met upon discharge date) in order to increase pts functional independence and safety, and decrease burden of care:  1. Pt will perform self-feeding with independence. 2. Pt will perform grooming with independent. 3. Pt will perform UB bathing with independence. 4. Pt will perform LB bathing with Mod I.  5. Pt will perform tub/shower transfer with Mod I.   6. Pt will perform UB dressing with independence. 7. Pt will perform LB dressing with Mod Iindependence. 8. Pt will perform toileting task with Mod independence. 9. Pt will perform toilet transfer with Mod I. Short Term Weekly Goals for (17 to 17) in order to increase pts functional independence and safety, and decrease burden of care:  3. Pt will perform UB bathing with independence. 4. Pt will perform LB bathing with Supervision. 5. Pt will perform tub/shower transfer with supervision. 6. Pt will perform UB dressing with Supervision. 7. Pt will perform LB dressing with Supervision. 8. Pt will perform toileting task with Supervision. 9. Pt will perform toilet transfer with Supervision. Outcome: Progressing Towards Goal  OCCUPATIONAL THERAPY DAILY NOTE  Patient Name:Mj Mckeon  Time Spent With Patient  Time In: 0815  Time Out: 0920  Time In: 8490  Time Out: 12     Medical Diagnosis:  Left AKA  Status post above knee amputation of left lower extremity ([address] Status post above knee amputation of left lower extremity ([address]      Pain at start of tx:0  Pain at stop of tx:0     Patient identified with name and :yes  Subjective: \"I'm just ready to go home on Friday. \"     Objective: Pt seen for 2, 1:1 sessions. 1session for 65 minutes and 1 session for 25 minutes.  Pt seen for UE strengthening for increase [place] w/functional transfers and dynamic standing balance/tolerance for ADL carryover                   THERAPEUTIC ACTIVITY                Pt tolerates standing ~ 10 minutes w/1                                                                             rest break participating in tabletop activity                                                                            W/BUE. Pt requires SBA and no LOB. THERAPEUTIC EXERCISE Daily Assessment     Pt performs BUE TherEx w/3# dowel in multiple planes 15x2 at w/c level for increase [place] w/functional transfers. Pt participates w/therapeutic activity w/peg board on incline using BUE w/2# wrist weights at w/c level for increase [place] w/functional transfers. Pt tolerates 15 minutes on arm bike at w/c level for increase activity tolerance w/ADLs. GROOMING Daily Assessment     Grooming  Grooming Assistance : 5 (Stand-by assistance)   Pt tolerates standing sinkside performing hand hygiene. Pt requires minimal vc's for safety w/wound vac tubing mgt. Assessment: Pt is pleasant and cooperative, motivated for discharge home 9/8/17. Pt demonstrates good effort w/all tasks and no c/o pain throughout session. Plan of Care: Continue POC to maximize safety and [place] w/ADL and functional transfers in preparation for discharge 9/8/17.      DARSHANA Becker  9/6/2017

## 2017-09-06 NOTE — WOUND CARE
Physical Exam   Room 191: pt's wound vac was changed this am, plan to see pt on Friday am.  Brina Renner BSN, RN, [facility] [place], [address]

## 2017-09-06 NOTE — PROGRESS NOTES
Pt aware of plans for angio tomorrow  Npo after midnight  INR 2.2, Dr Colletta Pander aware.  No plans for any meds to correct at this time

## 2017-09-06 NOTE — PROGRESS NOTES
[address]  [address] [place] [address], [address]     INPATIENT REHABILITATION  DAILY PROGRESS NOTE     Date: 9/6/2017    Name: Iesha Arenas Age / Sex: 59 y.o. / male   CSN: [id number] MRN: [id number]   [facility] Date: 8/25/2017 Length of Stay: 12 days     Primary Rehab Diagnosis: Impaired Mobility and ADLs secondary to:  1. S/P Left above-the-knee amputation (8/18/2017 - Dr. Kody Daugherty)  3. History of ischemic rest pain of the left lower extremity due to severe peripheral vascular disease       Subjective:     Patient seen and examined. Blood pressure controlled. Losartan was not given this AM due to BP precautions. Patient's Complaint:   No significant medical complaints    Pain Control: stable, mild-to-moderate joint symptoms intermittently, reasonably well controlled by current meds      Objective:     Vital Signs:  Patient Vitals for the past 24 hrs:   BP Temp Pulse Resp SpO2   09/06/17 0920 142/72 - 71 - -   09/06/17 0800 142/72 97.1 °F (36.2 °C) 71 18 100 %   09/06/17 0548 111/57 - 60 - -   09/05/17 2100 128/63 97.9 °F (36.6 °C) 64 18 99 %   09/05/17 1552 114/60 98 °F (36.7 °C) 66 18 99 %        Physical Examination:  GENERAL SURVEY: Patient is awake, alert, oriented x 3, sitting comfortably on the chair, not in acute respiratory distress.   HEENT: pale palpebral conjunctivae, anicteric sclerae, no nasoaural discharge, moist oral mucosa  NECK: supple, no jugular venous distention, no palpable lymph nodes  CHEST/LUNGS: symmetrical chest expansion, good air entry, clear breath sounds  HEART: adynamic precordium, good S1 S2, no S3, regular rhythm, no murmurs  ABDOMEN: flat, bowel sounds appreciated, soft, non-tender  EXTREMITIES: (+) left AKA stump with dressing, (+) WoundVac on left medial thigh, (+) ~1 cm x ~1 cm ulcer on right heel, (+) 1.5 cm x 1.5 cm wound above lateral malleolus of right ankle, pale nailbeds, no edema, full and equal pulses, no calf tenderness NEUROLOGICAL EXAM: The patient is awake, alert and oriented x3, able to answer questions fairly appropriately, able to follow 1 and 2 step commands. Able to tell time from the wall clock. Cranial nerves II-XII are grossly intact. No gross sensory deficit.   Motor strength is 4+/5 on BUE, 4+/5 on the RLE, 3+/5 on the left hip, 4-/5 on the left knee and left ankle.     Incision(s): healing well, clean, dry, and intact       Current Medications:  Current Facility-Administered Medications   Medication Dose Route Frequency    alteplase (CATHFLO) 1 mg in sterile water (preservative free) 1 mL injection  1 mg InterCATHeter PRN    losartan (COZAAR) tablet 100 mg  100 mg Oral DAILY    acetaminophen (TYLENOL) tablet 650 mg  650 mg Oral Q4H PRN    docusate sodium (COLACE) capsule 100 mg  100 mg Oral BID    bisacodyl (DULCOLAX) tablet 10 mg  10 mg Oral Q48H PRN    lactobacillus sp. 50 billion cpu (BIO-K PLUS) capsule 1 Cap  1 Cap Oral DAILY    magnesium oxide (MAG-OX) tablet 400 mg  400 mg Oral DAILY    gabapentin (NEURONTIN) capsule 300 mg  300 mg Oral Q8H    furosemide (LASIX) tablet 20 mg  20 mg Oral DAILY    HYDROmorphone (DILAUDID) tablet 2-4 mg  2-4 mg Oral Q4H PRN    potassium chloride (KLOR-CON) packet 20 mEq  20 mEq Oral DAILY    polyethylene glycol (MIRALAX) packet 17 g  17 g Oral DAILY    amiodarone (CORDARONE) tablet 200 mg  200 mg Oral DAILY    aspirin chewable tablet 81 mg  81 mg Oral DAILY WITH BREAKFAST    cholecalciferol (VITAMIN D3) tablet 2,000 Units  2,000 Units Oral DAILY    ferrous sulfate tablet 325 mg  1 Tab Oral DAILY WITH BREAKFAST    ascorbic acid (vitamin C) (VITAMIN C) tablet 250 mg  250 mg Oral DAILY WITH BREAKFAST    zinc sulfate (ZINCATE) capsule 220 mg  1 Cap Oral DAILY    atorvastatin (LIPITOR) tablet 40 mg  40 mg Oral QHS    metoprolol tartrate (LOPRESSOR) tablet 25 mg  25 mg Oral Q12H    cyanocobalamin tablet 1,000 mcg  1,000 mcg Oral DAILY    ampicillin-sulbactam (UNASYN) 3 g in 0.9% sodium chloride (MBP/ADV) 100 mL MBP  3 g IntraVENous Q6H       Allergies: Allergies   Allergen Reactions    Morphine Other (comments)     Patient gets confused with morphine. Functional Progress:    PHYSICAL THERAPY    ON ADMISSION MOST RECENT   Wheelchair Mobility/Management  Able to Propel (ft): 250 feet  Functional Level: 6  Curbs/Ramps Assist Required (FIM Score): 6 (Modified independent)  Wheelchair Setup Assist Required : 6 (Modified independent)  Wheelchair Management: Manages right brake, Manages left brake Wheelchair Mobility/Management  Able to Propel (ft): 300 feet  Functional Level: 6  Curbs/Ramps Assist Required (FIM Score): 6 (Modified independent)  Wheelchair Setup Assist Required : 6 (Modified independent)  Wheelchair Management: Manages left brake, Manages right brake, Manages right footrest     Gait  Amount of Assistance: 4 (Contact guard assistance)  Distance (ft): 40 Feet (ft) (x2 reps)  Assistive Device: Walker, rolling Gait  Amount of Assistance: 4 (Contact guard assistance)  Distance (ft): 40 Feet (ft) (x 3 trials)  Assistive Device: Walker, rolling     Balance-Sitting/Standing  Sitting - Static: Normal   Sitting - Dynamic: Normal  Standing - Static: Fair, Constant support (with RW)  Standing - Dynamic : Impaired Balance-Sitting/Standing  Sitting - Static: Good (unsupported)  Sitting - Dynamic: Good (unsupported)  Standing - Static: Fair  Standing - Dynamic : Impaired     Bed/Mat Mobility  Rolling Right : 7 (Independent)  Rolling Left : 7 (Independent)  Supine to Sit : 7 (Independent)  Sit to Supine : 7 (Independent) Bed/Mat Mobility  Rolling Right : 7 (Independent)  Rolling Left : 7 (Independent)  Supine to Sit : 7 (Independent)  Sit to Supine : 7 (Independent)     Transfers  Transfer Type:  Other  Other: Stand-step with RW with CGA/SBA  Transfer Assistance : 4 (Contact guard assistance)  Sit to Stand Assistance: Contact guard assistance  Car Transfers: Not tested  Car Type: n/a Transfers  Transfer Type: Other  Other: Patient performs stand hop transfer with RW requiring verbal cueing for safety cues to ensure proper hand placement  Transfer Assistance : 5 (Supervision/setup)  Sit to Stand Assistance: Supervision  Car Transfers: Supervision  Car Type: Car simulator     Steps or Stairs  Steps/Stairs Ambulated (#):  (3 4 inch)  Level of Assist : 0 (Not tested)  Rail Use: Both Steps or Stairs  Steps/Stairs Ambulated (#): 3  Level of Assist : 2 (Maximal assistance)  Rail Use: Right  (L UE crutch)         Lab/Data Review:  Recent Results (from the past 24 hour(s))   PROTHROMBIN TIME + INR    Collection Time: 09/06/17  6:39 AM   Result Value Ref Range    Prothrombin time 23.3 (H) 11.5 - 15.2 sec    INR 2.2 (H) 0.8 - 1.2         Estimated Glomerular Filtration Rate:  On admission, estimated GFR based on a Creatinine of 0.62 mg/dl:   Using CKD-EPI = 108.6 mL/min/1.73m2   Using MDRD = 141.1 mL/min/1.73m2  Most recent estimated GFR, based on a Creatinine of 0.73 mg/dl on 9/4/2017:   Using CKD-EPI = 101.5 mL/min/1.73m2   Using MDRD = 116.9 mL/min/1.73m2       Assessment:     Primary Rehabilitation Diagnosis  1. Impaired Mobility and ADLs  2. S/P Left above-the-knee amputation (8/18/2017 - Dr. Nando Weaver)  3.  History of ischemic rest pain of the left lower extremity due to severe peripheral vascular disease      Comorbidities   Benign hypertensive heart disease with systolic congestive heart failure, NYHA class 2  I11.0, I50.20    DDD (degenerative disc disease), lumbar M51.36    Erectile dysfunction N52.9    Spinal stenosis of lumbar region with radiculopathy M48.06, M54.16    Hereditary peripheral neuropathy G60.9    Aortoiliac occlusive disease  I74.09    Atherosclerosis of native artery of both lower extremities with intermittent claudication  I70.213    Peripheral artery disease  I73.9    Coronary artery disease involving native coronary artery of native heart I25.10    Paroxysmal atrial fibrillation  I48.0    Acute blood loss as cause of postoperative anemia D62    Anticoagulated on Coumadin Z51.81, Z79.01    Ischemic cardiomyopathy I25.5    Chronic systolic heart failure  K21.55    Carotid artery disease  I77.9    Dyslipidemia E78.5    Euthyroid sick syndrome E07.81    Aftercare following surgery of the circulatory system Z48.812    Status post femoral-popliteal bypass surgery Z95.828    History of cardioversion Z98.890    Critical ischemia of right lower extremity I99.8    History of vitamin D deficiency Z86.39    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    Infection of vascular bypass graft  T82. 7XXA    Chronic anemia D64.9    Chronic ulcer of right heel (HCC) L97.419    Prolonged Q-T interval on ECG R94.31    Aftercare for amputation stump Z47.81    Moderate to severe pulmonary hypertension  I27.2    Adverse effect of amiodarone T46.2X5A    Skin ulcer of malleolar area of right ankle  L97.319         Plan:     1. Justification for continued stay: Good progression towards established rehabilitation goals. 2. Medical Issues being followed closely:    [x]  Fall and safety precautions     [x]  Wound Care     [x]  Bowel and Bladder Function     [x]  Fluid Electrolyte and Nutrition Balance     [x]  Pain Control      3. Issues that 24 hour rehabilitation nursing is following:    [x]  Fall and safety precautions     [x]  Wound Care     [x]  Bowel and Bladder Function     [x]  Fluid Electrolyte and Nutrition Balance     [x]  Pain Control      [x]  Assistance with and education on in-room safety with transfers to and from the bed, wheelchair, toilet and shower. 4. Acute rehabilitation plan of care:    [x]  Continue current care and rehab. [x]  Physical Therapy           [x]  Occupational Therapy           []  Speech Therapy     []  Hold Rehab until further notice     5.  Medications:    [x]  MAR Reviewed     [x]  Continue Present Medications     6. DVT Prophylaxis:      []  Lovenox     []  Unfractionated Heparin     [x]  Coumadin     []  NOAC     []  VASQUEZ Stockings     []  Sequential Compression Device     []  None     7. Orders:   > S/P Left above-the-knee amputation (8/18/2017 - Dr. Jovanni Hightower); History of ischemic rest pain of the left lower extremity due to severe peripheral artery disease   > Wound vac dressing changes by staff nurses every Monday, Wednesday, & Friday on day shift. Measure wound size & document with each dressing change. Settings: 125mmHG low continuous suction. Measure wound size & document with each dressing change. Change canister when 3/4 full. May use saline dressings daily until wound vac initiated.   > Mepilex Border dressings to left AKA staple line q2days & prn soilage. Wrap with ace wrap in figure 8 form.   > WoundVac was discontinued this PM   > Staples to be removed on 9/15/2017    > S/P Removal of infected graft; Repair of left superficial femoral artery; Repair of left common femoral artery (7/25/2017 - Dr. Jovanni Hightower);  History of sepsis associated with infection of vascular bypass graft   > Continue Ampicillin Sulbactam 3 grams IVPB q 6 hr (STOP DATE: 9/19/2017)    > Acute Postoperative Blood Loss Anemia   > Hgb/Hct (8/24/2017, prior to admission to the ARU) = 8.4/25.6   > Anemia work-up (8/22/2017) showed serum iron 15, TIBC 146, iron % saturation 10   > Hgb/Hct (8/26/2017, on admission) = 8.6/26.5   > Reticulocyte count (8/26/2017) = 4.7   > Ferritin (8/26/2017) = 1066   > Hgb/Hct (8/28/2017) = 9.0/27.9    > Hgb/Hct (8/31/2017) = 8.4/26.4   > Hgb/Hct (9/3/2017) = 8.6/26.4    > Hgb/Hct (9/4/2017) = 8.7/27.5    > Continue:    > FeSO4 325 mg PO once daily with breakfast     > Ascorbic Acid 250 mg PO once daily with breakfast (to enhance the absorption of the FeSO4)     > Benign hypertensive heart disease with chronic systolic heart failure   > On 8/26/2017, increased Losartan from 50 mg to 100 mg PO once daily (6AM)   > Continue:    > Furosemide 20 mg PO once daily (6AM)    > Decrease Losartan from 100 mg to 50 mg PO once daily (6AM)    > Metoprolol tartrate 25 mg PO q 12 hr (9AM, 9PM)    > Paroxysmal atrial fibrillation, presently normal sinus rhythm, anticoagulated on Coumadin   > Continue:    > Amiodarone 200 mg PO once daily    > Metoprolol tartrate 25 mg PO q 12 hr (9AM, 9PM)   > Target INR = 2 to 3   > INR 2.2   > Patient received no Coumadin last night   > Hold Coumadin as per Vascular Surgery    > Chronic ulcer of right heel    > Apply rigid heel suspension boots on both feet when supine in bed   > Podiatry follow-up (Dr. Jarret Recio) called by Vascular Surgery for comanagement   > Arterial duplex ultrasound (8/29/2017) showed patent common femoral to popliteal artery bypass graft without evidence of a hemodynamically significant stenosis. > For angiogram of the right lower extremity tomorrow   > Continue:    > Ascorbic acid 250 mg PO once daily with breakfast    > Zinc sulfate 220 mg PO once daily    > Skin ulcer of malleolar area of right ankle   > Cleanse wound with saline, apply Aquacel ag dressing, Mepilex Border to right lateral ankle wound every 48hrs & prn soilage. > Analgesia   > Continue:    > Acetaminophen 650 mg PO q 4 hr PRN for pain level less than 5/10    > Hydromorphone 2-4 mg PO q 4 hr PRN for pain level greater than 4/10      8. Patient's progress in rehabilitation and medical issues discussed with the patient. All questions answered to the best of my ability. Care plan discussed with patient and nurse.       Signed:    Nanyc Rashid MD    September 6, 2017

## 2017-09-06 NOTE — WOUND CARE
Physical Exam   Room 191: wound assessment  Wound Thigh Left (Active) open surgical incision   DRESSING STATUS Intact; Removed 9/6/2017  4:28 PM   DRESSING TYPE Negative pressure wound therapy 9/6/2017  4:28 PM   Incision site well approximated? No 9/6/2017  4:28 PM   Non-Pressure Injury Full thickness (subcut/muscle) 9/6/2017  4:28 PM   Wound Length (cm) 4 cm 9/6/2017  4:28 PM   Wound Width (cm) 1 cm 9/6/2017  4:28 PM   Wound Depth (cm) 0.1 9/6/2017  4:28 PM   Wound Surface area (cm^3) 0.4 cm^2 9/6/2017  4:28 PM   Change in Wound Size % 97.35 9/6/2017  4:28 PM   Condition of Base Granulation 9/6/2017  4:28 PM   Condition of Edges Closed 9/6/2017  4:28 PM   Epithelialization (%) 90 9/6/2017  4:28 PM   Tissue Type Red 9/6/2017  4:28 PM   Tissue Type Percent Red 100 9/6/2017  4:28 PM   Drainage Amount  Scant 9/6/2017  4:28 PM   Drainage Color Serosanguinous 9/6/2017  4:28 PM   Wound Odor None 9/6/2017  4:28 PM   Periwound Skin Condition Indents from vac tubing 9/6/2017  4:28 PM   Cleansing and Cleansing Agents  Normal saline 9/6/2017  4:28 PM   Dressing Type Applied Silver products;Silicone 5/1/9238  7:11 PM   Procedure Tolerated Fair 9/6/2017  4:28 PM   Number of days:30   Wound vac removed from left top thigh. Pt agreeable to aquacel ag & mepilex treatment. Wound care education provided to pt at this time. Will turn over care to nursing staff at this time.   Stacy Hanson BSN, RN, [facility], [address]

## 2017-09-07 ENCOUNTER — HOME CARE VISIT (OUTPATIENT)
Dept: HOME HEALTH SERVICES | Facility: HOME HEALTH | Age: 57
End: 2017-09-07
Payer: COMMERCIAL

## 2017-09-07 ENCOUNTER — HOME HEALTH ADMISSION (OUTPATIENT)
Dept: HOME HEALTH SERVICES | Facility: HOME HEALTH | Age: 57
End: 2017-09-07
Payer: COMMERCIAL

## 2017-09-07 ENCOUNTER — APPOINTMENT (OUTPATIENT)
Dept: CARDIAC CATH/INVASIVE PROCEDURES | Age: 57
DRG: 516 | End: 2017-09-07
Attending: INTERNAL MEDICINE
Payer: COMMERCIAL

## 2017-09-07 LAB
HCT VFR BLD AUTO: 28.8 % (ref 36–48)
HGB BLD-MCNC: 9.4 G/DL (ref 13–16)
INR PPP: 1.6 (ref 0.8–1.2)
PLATELET # BLD AUTO: 510 K/UL (ref 135–420)
PROTHROMBIN TIME: 18.3 SEC (ref 11.5–15.2)

## 2017-09-07 PROCEDURE — 74011000250 HC RX REV CODE- 250: Performed by: SURGERY

## 2017-09-07 PROCEDURE — 76937 US GUIDE VASCULAR ACCESS: CPT

## 2017-09-07 PROCEDURE — 97116 GAIT TRAINING THERAPY: CPT

## 2017-09-07 PROCEDURE — C1769 GUIDE WIRE: HCPCS

## 2017-09-07 PROCEDURE — C1894 INTRO/SHEATH, NON-LASER: HCPCS

## 2017-09-07 PROCEDURE — 37229 HC ARTHERC TIB/PER UNI +/-PTA INIT: CPT

## 2017-09-07 PROCEDURE — 74011250636 HC RX REV CODE- 250/636: Performed by: INTERNAL MEDICINE

## 2017-09-07 PROCEDURE — 74011250637 HC RX REV CODE- 250/637: Performed by: INTERNAL MEDICINE

## 2017-09-07 PROCEDURE — 99153 MOD SED SAME PHYS/QHP EA: CPT

## 2017-09-07 PROCEDURE — 74011000250 HC RX REV CODE- 250

## 2017-09-07 PROCEDURE — C1725 CATH, TRANSLUMIN NON-LASER: HCPCS

## 2017-09-07 PROCEDURE — 65310000000 HC RM PRIVATE REHAB

## 2017-09-07 PROCEDURE — 77030004565 HC CATH ANGI DX TMPO CARD -B

## 2017-09-07 PROCEDURE — 047K3ZZ DILATION OF RIGHT FEMORAL ARTERY, PERCUTANEOUS APPROACH: ICD-10-PCS | Performed by: INTERNAL MEDICINE

## 2017-09-07 PROCEDURE — 97110 THERAPEUTIC EXERCISES: CPT

## 2017-09-07 PROCEDURE — 37233 HC ARTHERC TIB/PER UNI +/-PTA ADDL: CPT

## 2017-09-07 PROCEDURE — 74011250637 HC RX REV CODE- 250/637: Performed by: SURGERY

## 2017-09-07 PROCEDURE — 85049 AUTOMATED PLATELET COUNT: CPT | Performed by: INTERNAL MEDICINE

## 2017-09-07 PROCEDURE — 97530 THERAPEUTIC ACTIVITIES: CPT

## 2017-09-07 PROCEDURE — 74011000258 HC RX REV CODE- 258: Performed by: INTERNAL MEDICINE

## 2017-09-07 PROCEDURE — 85018 HEMOGLOBIN: CPT | Performed by: INTERNAL MEDICINE

## 2017-09-07 PROCEDURE — 97535 SELF CARE MNGMENT TRAINING: CPT

## 2017-09-07 PROCEDURE — 99152 MOD SED SAME PHYS/QHP 5/>YRS: CPT

## 2017-09-07 PROCEDURE — 37225 HC ARTHERC FEMPOP UNI +/-PTA: CPT

## 2017-09-07 PROCEDURE — 74011250636 HC RX REV CODE- 250/636: Performed by: SURGERY

## 2017-09-07 PROCEDURE — C1760 CLOSURE DEV, VASC: HCPCS

## 2017-09-07 PROCEDURE — 85610 PROTHROMBIN TIME: CPT | Performed by: INTERNAL MEDICINE

## 2017-09-07 PROCEDURE — 74011636320 HC RX REV CODE- 636/320: Performed by: SURGERY

## 2017-09-07 PROCEDURE — C1724 CATH, TRANS ATHEREC,ROTATION: HCPCS

## 2017-09-07 PROCEDURE — 75710 ARTERY X-RAYS ARM/LEG: CPT

## 2017-09-07 PROCEDURE — 77030013515 HC DEV INFL ANGI BSC -B

## 2017-09-07 RX ORDER — DIPHENHYDRAMINE HYDROCHLORIDE 50 MG/ML
12.5 INJECTION, SOLUTION INTRAMUSCULAR; INTRAVENOUS
Status: DISCONTINUED | OUTPATIENT
Start: 2017-09-07 | End: 2017-09-08

## 2017-09-07 RX ORDER — LOSARTAN POTASSIUM 25 MG/1
50 TABLET ORAL DAILY
Qty: 15 TAB | Refills: 0 | Status: SHIPPED | OUTPATIENT
Start: 2017-09-07 | End: 2017-09-19 | Stop reason: SDUPTHER

## 2017-09-07 RX ORDER — NITROGLYCERIN 40 MG/100ML
INJECTION INTRAVENOUS
Status: COMPLETED
Start: 2017-09-07 | End: 2017-09-07

## 2017-09-07 RX ORDER — MIDAZOLAM HYDROCHLORIDE 1 MG/ML
1-4 INJECTION, SOLUTION INTRAMUSCULAR; INTRAVENOUS
Status: DISCONTINUED | OUTPATIENT
Start: 2017-09-07 | End: 2017-09-07 | Stop reason: HOSPADM

## 2017-09-07 RX ORDER — HEPARIN SODIUM 200 [USP'U]/100ML
1000 INJECTION, SOLUTION INTRAVENOUS ONCE
Status: COMPLETED | OUTPATIENT
Start: 2017-09-07 | End: 2017-09-07

## 2017-09-07 RX ORDER — OXYCODONE AND ACETAMINOPHEN 5; 325 MG/1; MG/1
1 TABLET ORAL
Status: DISCONTINUED | OUTPATIENT
Start: 2017-09-07 | End: 2017-09-08 | Stop reason: HOSPADM

## 2017-09-07 RX ORDER — WARFARIN 7.5 MG/1
7.5 TABLET ORAL ONCE
Status: COMPLETED | OUTPATIENT
Start: 2017-09-07 | End: 2017-09-07

## 2017-09-07 RX ORDER — LOSARTAN POTASSIUM 25 MG/1
25 TABLET ORAL DAILY
Status: ON HOLD | COMMUNITY
End: 2017-09-07

## 2017-09-07 RX ORDER — VERAPAMIL HYDROCHLORIDE 2.5 MG/ML
INJECTION, SOLUTION INTRAVENOUS
Status: DISCONTINUED
Start: 2017-09-07 | End: 2017-09-07

## 2017-09-07 RX ORDER — ONDANSETRON 2 MG/ML
4 INJECTION INTRAMUSCULAR; INTRAVENOUS
Status: DISCONTINUED | OUTPATIENT
Start: 2017-09-07 | End: 2017-09-08 | Stop reason: HOSPADM

## 2017-09-07 RX ORDER — FENTANYL CITRATE 50 UG/ML
25-100 INJECTION, SOLUTION INTRAMUSCULAR; INTRAVENOUS
Status: DISCONTINUED | OUTPATIENT
Start: 2017-09-07 | End: 2017-09-07 | Stop reason: ALTCHOICE

## 2017-09-07 RX ORDER — OXYCODONE AND ACETAMINOPHEN 5; 325 MG/1; MG/1
1 TABLET ORAL
Qty: 50 TAB | Refills: 0 | Status: ON HOLD | OUTPATIENT
Start: 2017-09-07 | End: 2017-09-15

## 2017-09-07 RX ORDER — HEPARIN SODIUM 1000 [USP'U]/ML
1000-10000 INJECTION, SOLUTION INTRAVENOUS; SUBCUTANEOUS
Status: DISCONTINUED | OUTPATIENT
Start: 2017-09-07 | End: 2017-09-07 | Stop reason: HOSPADM

## 2017-09-07 RX ORDER — LIDOCAINE HYDROCHLORIDE 10 MG/ML
1-30 INJECTION, SOLUTION EPIDURAL; INFILTRATION; INTRACAUDAL; PERINEURAL
Status: DISCONTINUED | OUTPATIENT
Start: 2017-09-07 | End: 2017-09-07 | Stop reason: HOSPADM

## 2017-09-07 RX ORDER — VERAPAMIL HYDROCHLORIDE 2.5 MG/ML
10 INJECTION, SOLUTION INTRAVENOUS ONCE
Status: COMPLETED | OUTPATIENT
Start: 2017-09-07 | End: 2017-09-07

## 2017-09-07 RX ORDER — OXYCODONE AND ACETAMINOPHEN 5; 325 MG/1; MG/1
1 TABLET ORAL
Status: ON HOLD | COMMUNITY
End: 2017-09-07

## 2017-09-07 RX ORDER — NITROGLYCERIN 40 MG/100ML
5-20 INJECTION INTRAVENOUS
Status: DISCONTINUED | OUTPATIENT
Start: 2017-09-07 | End: 2017-09-07 | Stop reason: HOSPADM

## 2017-09-07 RX ORDER — DILTIAZEM HYDROCHLORIDE 30 MG/1
30 TABLET, FILM COATED ORAL 4 TIMES DAILY
COMMUNITY
End: 2017-09-08

## 2017-09-07 RX ORDER — ACETAMINOPHEN 325 MG/1
650 TABLET ORAL
Status: DISCONTINUED | OUTPATIENT
Start: 2017-09-07 | End: 2017-09-08 | Stop reason: HOSPADM

## 2017-09-07 RX ORDER — HYDROMORPHONE HYDROCHLORIDE 1 MG/ML
0.5 INJECTION, SOLUTION INTRAMUSCULAR; INTRAVENOUS; SUBCUTANEOUS
Status: DISCONTINUED | OUTPATIENT
Start: 2017-09-07 | End: 2017-09-08

## 2017-09-07 RX ORDER — WARFARIN SODIUM 5 MG/1
5 TABLET ORAL
Qty: 15 TAB | Refills: 0 | Status: SHIPPED | OUTPATIENT
Start: 2017-09-07 | End: 2017-10-05

## 2017-09-07 RX ORDER — POTASSIUM CHLORIDE 1.5 G/1.77G
20 POWDER, FOR SOLUTION ORAL DAILY
Qty: 15 PACKET | Refills: 0 | Status: SHIPPED | OUTPATIENT
Start: 2017-09-07 | End: 2017-09-19 | Stop reason: SDUPTHER

## 2017-09-07 RX ORDER — DULOXETIN HYDROCHLORIDE 60 MG/1
60 CAPSULE, DELAYED RELEASE ORAL DAILY
COMMUNITY
End: 2017-09-08

## 2017-09-07 RX ORDER — VERAPAMIL HYDROCHLORIDE 2.5 MG/ML
INJECTION, SOLUTION INTRAVENOUS
Status: COMPLETED
Start: 2017-09-07 | End: 2017-09-07

## 2017-09-07 RX ADMIN — OXYCODONE AND ACETAMINOPHEN 1 TABLET: 5; 325 TABLET ORAL at 16:22

## 2017-09-07 RX ADMIN — HEPARIN SODIUM 1000 UNITS: 200 INJECTION, SOLUTION INTRAVENOUS at 14:03

## 2017-09-07 RX ADMIN — METOPROLOL TARTRATE 25 MG: 25 TABLET, FILM COATED ORAL at 21:52

## 2017-09-07 RX ADMIN — VERAPAMIL HYDROCHLORIDE 10 MG: 2.5 INJECTION INTRAVENOUS at 14:33

## 2017-09-07 RX ADMIN — DOCUSATE SODIUM 100 MG: 100 CAPSULE, LIQUID FILLED ORAL at 18:07

## 2017-09-07 RX ADMIN — GABAPENTIN 300 MG: 300 CAPSULE ORAL at 21:52

## 2017-09-07 RX ADMIN — AMPICILLIN AND SULBACTAM 3 G: 1; 2 INJECTION, POWDER, FOR SOLUTION INTRAMUSCULAR; INTRAVENOUS at 21:52

## 2017-09-07 RX ADMIN — AMPICILLIN AND SULBACTAM 3 G: 1; 2 INJECTION, POWDER, FOR SOLUTION INTRAMUSCULAR; INTRAVENOUS at 16:22

## 2017-09-07 RX ADMIN — IOPAMIDOL 10 ML: 612 INJECTION, SOLUTION INTRAVENOUS at 14:57

## 2017-09-07 RX ADMIN — POLYETHYLENE GLYCOL 3350 17 G: 17 POWDER, FOR SOLUTION ORAL at 18:07

## 2017-09-07 RX ADMIN — MIDAZOLAM HYDROCHLORIDE 1 MG: 1 INJECTION, SOLUTION INTRAMUSCULAR; INTRAVENOUS at 14:00

## 2017-09-07 RX ADMIN — ATORVASTATIN CALCIUM 40 MG: 40 TABLET, FILM COATED ORAL at 21:52

## 2017-09-07 RX ADMIN — HEPARIN SODIUM 5000 UNITS: 1000 INJECTION, SOLUTION INTRAVENOUS; SUBCUTANEOUS at 14:33

## 2017-09-07 RX ADMIN — MIDAZOLAM HYDROCHLORIDE 1 MG: 1 INJECTION, SOLUTION INTRAMUSCULAR; INTRAVENOUS at 14:13

## 2017-09-07 RX ADMIN — NITROGLYCERIN 10000 MCG: 40 INJECTION INTRAVENOUS at 14:32

## 2017-09-07 RX ADMIN — LIDOCAINE HYDROCHLORIDE 2 ML: 10 INJECTION, SOLUTION EPIDURAL; INFILTRATION; INTRACAUDAL; PERINEURAL at 14:13

## 2017-09-07 RX ADMIN — FENTANYL CITRATE 50 MCG: 50 INJECTION INTRAMUSCULAR; INTRAVENOUS at 14:38

## 2017-09-07 RX ADMIN — AMPICILLIN AND SULBACTAM 3 G: 1; 2 INJECTION, POWDER, FOR SOLUTION INTRAMUSCULAR; INTRAVENOUS at 08:54

## 2017-09-07 RX ADMIN — AMPICILLIN AND SULBACTAM 3 G: 1; 2 INJECTION, POWDER, FOR SOLUTION INTRAMUSCULAR; INTRAVENOUS at 04:00

## 2017-09-07 RX ADMIN — FENTANYL CITRATE 50 MCG: 50 INJECTION INTRAMUSCULAR; INTRAVENOUS at 14:00

## 2017-09-07 RX ADMIN — WARFARIN SODIUM 7.5 MG: 7.5 TABLET ORAL at 18:08

## 2017-09-07 RX ADMIN — VERAPAMIL HYDROCHLORIDE 10 MG: 2.5 INJECTION, SOLUTION INTRAVENOUS at 14:33

## 2017-09-07 RX ADMIN — FENTANYL CITRATE 50 MCG: 50 INJECTION INTRAMUSCULAR; INTRAVENOUS at 14:13

## 2017-09-07 RX ADMIN — OXYCODONE AND ACETAMINOPHEN 1 TABLET: 5; 325 TABLET ORAL at 21:56

## 2017-09-07 NOTE — PROGRESS NOTES
Problem: Self Care Deficits Care Plan (Adult)  Goal: *Therapy Goal (Edit Goal, Insert Text)  Long Term Goals (to be met upon discharge date) in order to increase pts functional independence and safety, and decrease burden of care:  1. Pt will perform self-feeding with independence. 2. Pt will perform grooming with independent. 3. Pt will perform UB bathing with independence. 4. Pt will perform LB bathing with Mod I.  5. Pt will perform tub/shower transfer with Mod I.   6. Pt will perform UB dressing with independence. 7. Pt will perform LB dressing with Mod Iindependence. 8. Pt will perform toileting task with Mod independence. 9. Pt will perform toilet transfer with Mod I. Short Term Weekly Goals for (17 to 17) in order to increase pts functional independence and safety, and decrease burden of care:  3. Pt will perform UB bathing with independence. 4. Pt will perform LB bathing with Supervision. 5. Pt will perform tub/shower transfer with supervision. 6. Pt will perform UB dressing with Supervision. 7. Pt will perform LB dressing with Supervision. 8. Pt will perform toileting task with Supervision. 9. Pt will perform toilet transfer with Supervision. OCCUPATIONAL THERAPY DISCHARGE SUMMARY  Patient Name:Mj Castro  Time Spent With Patient  Time In:   Time Out: 910        Pain at start of tx:0/10  Pain at stop of tx:0/10     Patient identified with name and :yes     Objective: Pt seen for d/c shower see below for ADL's functional levels.        Problem List:    Decreased strength B UE  [ ]     Decreased strength trunk/core  [ ]     Decreased AROM   [ ]     Decreased PROM  [ ]     Decreased balance sitting  [ ]     Decreased balance standing  [X]     Decreased endurance  [ ]     Pain  [ ]        Functional Limitations:   Decreased independence with ADL  [X]     Decreased independence with functional transfers  [X]     Decreased independence with ambulation  [X]     Decreased independence with IADL  [X]        Outcome Measures:                  MMT Initial Assessment    Right Upper Extremity  Left Upper Extermity    UE AROM WDL WDL   Shoulder flexion 4/5 all joints 4/5 all joints   Shoulder extension       Shoulder ABDuction       Shoulder ADDUction       Elbow Flexion       Elbow Extension       Wrist Extension/Flexion                       MMT Discharge Assessment    Right Upper Extremity  Left Upper Extermity    [facility]    Shoulder flexion 4/5 4/5   Shoulder extension 4/5 4/5   Shoulder ABDuction 4/5 4/5   Shoulder ADDUction 4/5 4/5   Elbow Flexion 4/5 4/5   Elbow Extension 4/5 4/5   Wrist Extension/Flexion 4/5 4/5    4/5 4/5         0/5       No palpable muscle contraction  1/5       Palpable muscle contraction, no joint movement  2-/5      Less than full range of motion in gravity eliminated position  2/5       Able to complete full range of motion in gravity eliminated position  2+/5     Able to initiate movement against gravity  3-/5      More than half but not full range of motion against gravity  3/5       Able to complete full range of motion against gravity  3+/5     Completes full range of motion against gravity with minimal resistance  4-/5      Completes full range of motion against gravity with minimal-moderate resistance  4/5       Completes full range of motion against gravity with moderate resistance  4+/5     Completes full range of motion against gravity with moderate-maximum resistance  5/5       Completes full range of motion against gravity with maximum resistance  Coordination: B's UE Intact  Sensation: B's UE Intact      FIM SCORES Initial Assessment Discharge Assessment   Eating 6 Feeding/Eating  Feeding/Eating Assistance: 6 (Modified independent)   Grooming 7 Grooming  Grooming Assistance : 7 (Independent)  Oral Hygiene FIM: *7 independent  From wheel chair level    Bathing 4    Upper Body Bathing  Bathing Assistance, Upper: 6 (Modified independent)  Position Performed: Seated in chair. Lower Body Bathing  Bathing Assistance, Lower : 6 (Modified independent)  Position Performed: Seated in chair  Comments: Pt weight shift on shower bench washing buttocks      Upper Body Dressing 7 Upper Body Dressing   Dressing Assistance : 7 (Independent)   Lower Body Dressing 4 Lower Body Dressing   Dressing Assistance : 5 (Supervision)  Leg Crossed Method Used: No  Position Performed: Seated in chair;Standing  Adaptive Equipment Used: Walker  Comments: Pt jasmeet pants to thigh , stood with walker donning pants over hips/buttocks. Educated pt to hold unto pants strings in standing to prevent pants from falling. Sock and/or Shoe Management FIM: 6 mod independent. Toileting 3 Toileting  Toileting Assistance (FIM Score): 6 (Modified independent) Pt preformed hygiene from seated position   Adaptive Equipment: Walker   Tub/Shower Transfer 4 Tub or Shower Type: Shower  Amount of Assistance Required: 5 (Stand-by assistance)  Adaptive Equipment: Grab bars   Toilet Transfer 4 Functional Transfers  Toilet Transfer : Stand pivot transfer with walker  Amount of Assistance Required: 4 (Contact guard assistance)      Comprehension 6 7   Expression 6 7   Social Interaction 5 6   Problem Solving 4 6   Memory 4 6   Please see [facility] Interdisciplinary Eval: Coordination/Balance Section for details regarding FIM score description. OCCUPATIONAL THERAPY PLAN OF CARE     LTGs: Pt made progress during his rehab stay meeting 8/9 LTG's  Pt would benefit from continued skilled occupational therapy in order to improve self care and functional mobility within the home with use of least restrictive device. Interventions may include ADL training, range of motion (AROM, PROM B UE), motor function (B UE strengthening/coordination), activity tolerance (vitals, oxygen saturation levels),and functional transfer training.          Pt to be discharged on 9/8/17 with Home Health  provided by Ar[facility]. Pt d/c-ing to daughter home. Therapy recommendations:  Skill OT to maximize functional activities with ADL's    Equipment recommendations: 3:1 commode   Please see [facility]; Interdisciplinary Eval, Care Plan, and Patient Education for further information regarding occupational therapy discharge summary and plan of care.       Kisha TATE  9/7/2017

## 2017-09-07 NOTE — PROGRESS NOTES
Problem: Mobility Impaired (Adult and Pediatric)  Goal: *Acute Goals and Plan of Care (Insert Text)  Physical Therapy Short Term Goals  Initiated 2017 and to be accomplished within 7 day(s) 2017  1. Patient will move from supine to sit and sit to supine , scoot up and down and roll side to side in bed with independence. 2. Patient will transfer from bed to chair and chair to bed with supervision/set-up using the least restrictive device. 3. Patient will perform sit to stand with supervision/set-up. 4. Patient will ambulate with supervision/set-up for 50 feet with the least restrictive device. 5. Patient will ascend/descend 3 stairs with 2 handrail(s) with minimal assistance/contact guard assist.    Physical Therapy Long Term Goals  Initiated 2017 and to be accomplished within 14 day(s) 2017  1. Patient will move from supine to sit and sit to supine , scoot up and down and roll side to side in bed with independence. 2. Patient will transfer from bed to chair and chair to bed with modified independence using the least restrictive device. 3. Patient will perform sit to stand with modified independence. 4. Patient will ambulate with modified independence for 50 feet with the least restrictive device. 5. Patient will ascend/descend 3 stairs with 2 handrail(s) with supervision/set-up. PHYSICAL THERAPY DISCHARGE SUMMARY  Time In: 1001  Time Out: 1130  Patient off unit for procedure during 2nd session; unable to recover. Patient Name:Mj Carmen Ty   Precautions at discharge: Fall Risk     Pain at start of tx:0/10  Pain at stop of tx:0/10     Patient identified with name and : YES     Patient reports comfort with progression in therapy reporting that he is prepared to discharge home following family training for stair negotiation. Patient reports that he is \"excited\" to have and burger from [employer].      Problem List:    Decreased strength B LE  [ ]     Decreased strength trunk/core [ ]     Decreased AROM   [ ]     Decreased PROM  [ ]     Decreased balance sitting  [ ]     Decreased balance standing  [X]     Decreased endurance  [X]     Pain  [X]        Functional Limitations:   Decreased independence with bed mobility  [ ]     Decreased independence with functional transfers  [ ]     Decreased independence with ambulation  [X]     Decreased independence with stair negotiation  [X]                Outcome Measures: FIM/MMT                 MMT Initial Assessment    Right Lower Extremity Left Lower Extremity   Hip Flexion 4+ 4   Knee Extension 4+ NA   Knee Flexion 4+ NA   Ankle Dorsiflexion 4+ NA                  MMT Discharge Assessment    Right Lower Extremity Left Lower Extremity   Hip Flexion 5 5   Knee Extension 5 NA   Knee Flexion 4+ NA   Ankle Dorsiflexion 4+ NA   0/5       No palpable muscle contraction  1/5       Palpable muscle contraction, no joint movement  2-/5      Less than full range of motion in gravity eliminated position  2/5       Able to complete full range of motion in gravity eliminated position  2+/5     Able to initiate movement against gravity  3-/5      More than half but not full range of motion against gravity  3/5       Able to complete full range of motion against gravity  3+/5     Completes full range of motion against gravity with minimal resistance  4-/5      Completes full range of motion against gravity with minimal-moderate resistance  4/5       Completes full range of motion against gravity with moderate resistance  4+/5     Completes full range of motion against gravity with moderate-maximum resistance  5/5       Completes full range of motion against gravity with maximum resistance                 AROM: WFL      FIM SCORES Initial Assessment Discharge Assessment   Bed/Chair/Wheelchair Transfers 4 6   Wheelchair Mobility 6 6   Walking [place] 1 5   Steps/Stairs 0 4   PRIMARY MODE OF LOCOMOTION: W/C   Please see [facility] Interdisciplinary Eval: Coordination/Balance Section for details regarding FIM score description. BED/CHAIR/WHEELCHAIR TRANSFERS Initial Assessment Discharge Assessment   Rolling Right 7 (Independent) 7 (Independent)   Rolling Left 7 (Independent) 7 (Independent)   Supine to Sit 7 (Independent) 7 (Independent)   Sit to Stand Contact guard assistance Modified independent   Sit to Supine 7 (Independent) 7 (Independent)   Transfer Assist Score 4 6   Transfer Type Other Other   Comments Pt transfers sit<->stand and stand-step with RW with CGA/SBA for balance. Pt is independent with bed mobility without rails Patient performs squat pivot transfer w/o AD and stand hop transfer RW. Patient completes squat pivot transfer w/o AD requiring no physical assist or verbal cueing from therapist to complete safely; performed x 4 in session. Patient also performs stand hop transfer with RW to challenge transfers in home/non ideal environment; patient completes transfer x 12 in session with Mod I demonstrating good hand position, pace, and technique   Car Transfer Not tested Independent   Car Type n/a Car simulator          WHEELCHAIR MOBILITY/MANAGEMENT Initial Assessment Discharge Assessment   Able to [place] 250 feet 300 feet   Functional Level 6 6   Curbs/ramps assistance required 6 (Modified independent) 6 (Modified independent)   Wheelchair set up assistance required 6 (Modified independent) 6 (Modified independent)   Wheelchair management Manages right brake, Manages left brake Manages left brake;Manages right brake;Manages right footrest          WALKING INDEPENDENCE Initial Assessment Discharge Assessment   Assistive device Walker, rolling Walker, rolling   Ambulation assistance - level surface   SPV   Distance 40 Feet (ft) (x2 reps) 80 Feet (ft) (x 2 trials)   Functional Level 1 5   Comments Pt ambulates 40 ft with hop-to pattern with decreased step height and length, decreased balance and decreased activity tolerance.  Pt c/o pain in LLE with dependent positioning of ambulation Patient mobilizes 80 ft x 2 trials with SPV demonstrating decreased step clearance and mild antalgia 2/2 R LE pain with loading. Patient requires verbal cueing for increased step length, but is otherwise appropriate with ambulatory technique. Ambulation assistance - unlevel surface NT Unable to perform safely          STEPS/STAIRS Initial Assessment Discharge Assessment   Steps/Stairs ambulated  (3 4 inch) 12   Rail Use Both Both (41\" wide B HR)   Functional Level 0 4   Comments NT 12 steps with B HR 41\" apart to emulate home with [place] x 1 for ascending 2/2 COG control assist; descending with CGA for verbal cueing and tactile hand position cues to improve safety with downward progression. Curbs/Ramps NT Min a x 1              PHYSICAL THERAPY PLAN OF CARE     LTGs: Patient has achieved all PT goals with the exception of LTG 4 2/2 need for residual SPV for ambulation. Pt would benefit from continued skilled physical therapy in order to improve independent functional mobility within the home with use of least restrictive device. Interventions may include range of motion (AROM, PROM B LE/trunk), motor function (B LE/trunk strengthening/coordination), activity tolerance (vitals, oxygen saturation levels), bed mobility training, balance activities, gait training (progressive ambulation program), and functional transfer training. HEP handout:  09/06/2017     Pt to be discharged 09/08/2017 after family training for stair negotiation with assistance provided by daughter/daughter's partner. Therapy Recommendations upon discharge: HHPT  Equipment needs at discharge: W/C and RW     Please see IRC; Interdisciplinary Eval, Care Plan, and Patient Education for further information regarding physical therapy discharge summary and plan of care. Cheng Martinez, PT DPT  9/7/2017

## 2017-09-07 NOTE — PROGRESS NOTES
NUTRITION    Nutrition Consult: General Nutrition Management & Supplements     RECOMMENDATIONS / PLAN:     - Continue with current nutrition interventions  - Continue RD inpatient monitoring and evaluation. NUTRITION INTERVENTIONS & DIAGNOSIS:     [x] Meals/Snacks: modified diet  [x] Medical food supplementation: Ensure Enlive BID     Nutrition Diagnosis: Increased nutrient needs (protein) related to wound healing as evidenced by wound vac to left groin. ASSESSMENT:     9/7: Pt off unit. Has excellent po intake most meals per chart  8/31-8/14: Pt reported good appetite and meal and supplement intake  8/26: Good appetite. Likes Ensure Enlive supplements, receiving during inpatient hospitalization.      Average po intake adequate to meet patients estimated nutritional needs:   [x] Yes     [] No   [] Unable to determine at this time    Diet: DIET NUTRITIONAL SUPPLEMENTS Lunch, Dinner; ENSURE ENLIVE  DIET NPO      Food Allergies: NKFA  Current Appetite:   [x] Good     [] Fair     [] Poor     [] Other:  Appetite/meal intake prior to admission:   [x] Good     [] Fair     [] Poor     [] Other:  Feeding Limitations:  [] Swallowing difficulty    [] Chewing difficulty    [] Other:  Current Meal Intake:   Patient Vitals for the past 100 hrs:   % Diet Eaten   09/06/17 1759 60 %   09/06/17 1330 100 %   09/06/17 1000 100 %   09/05/17 1415 100 %   09/05/17 0900 100 %   09/04/17 1805 90 %   09/04/17 0930 100 %   09/03/17 1800 100 %   09/03/17 1335 100 %     BM:  9/6  Skin Integrity: surgical incision to left groin with wound vac; surgical incisions to chest, leg and thigh; vascular wound to right ankle and heel; left BKA  Edema: none  Pertinent Medications: Reviewed: ascorbic acid, cholecalciferol, cyanocobalamin, colace, ferrous sulfate, lactobacillus, mag-ox, miralax, KCl, zinc sulfate    No results for input(s): NA, K, CL, CO2, GLU, BUN, CREA, CA, MG, PHOS, ALB, PREALB, TBIL, SGOT, ALT in the last 72 hours.    Intake/Output Summary (Last 24 hours) at 09/07/17 1549  Last data filed at 09/07/17 1502   Gross per 24 hour   Intake              390 ml   Output             1800 ml   Net            -1410 ml       Anthropometrics:  Ht Readings from Last 1 Encounters:   09/07/17 5' 10\" (1.778 m)     Last 3 Recorded Weights in this Encounter    08/25/17 1330 09/07/17 1157   Weight: 70.8 kg (156 lb 1.4 oz) 70.8 kg (156 lb)     Body mass index is 22.38 kg/(m^2). Adjusted IBW: 67 kg    Weight History: Denies weight changes PTA. Weight Metrics 9/7/2017 8/25/2017 8/24/2017 8/17/2017 8/7/2017 8/5/2017 7/24/2017   Weight 156 lb - 156 lb - 165 lb 5.5 oz 165 lb 2 oz -   BMI - 22.38 kg/m2 - 22.38 kg/m2 23.72 kg/m2 - 23.69 kg/m2        Admitting Diagnosis: Left AKA  Status post above knee amputation of left lower extremity (HCC)  Pertinent PMHx: atherosclerosis, CHF    Education Needs:        [x] None identified  [] Identified - Not appropriate at this time  []  Identified and addressed - refer to education log  Learning Limitations:   [x] None identified  [] Identified    Cultural, Congregational & ethnic food preferences:  [x] None identified    [] Identified and addressed     ESTIMATED NUTRITION NEEDS:     Calories: 7360-1214 kcal (MSJx1.2-1.3) based on  [x] Actual BW  68 kg    [] IBW   Protein: 81-88 gm (1.2-1.3 gm/kg) based on  [x] Actual BW      [] IBW   Fluid: 1 mL/kcal     MONITORING & EVALUATION:     Nutrition Goal(s):   1. Po intake of meals will meet >75% of patient estimated nutritional needs within the next 7 days.   Outcome:  [x] Met/Ongoing    []  Not Met    [] New/Initial Goal     Monitoring:   [x] Diet tolerance   [x] Meal intake   [x] Supplement intake   [] GI symptoms/ability to tolerate po diet   [] Respiratory status   [] Plan of care      Previous Recommendations (for follow-up assessments only):     []   Implemented       []   Not Implemented (RD to address)     [x] No Recommendation Made     Discharge Planning: cardiac diet   [x] Participated in care planning, discharge planning, & interdisciplinary rounds as appropriate      Tricia Fu, [address] [place] [address]   Pager: [phone]

## 2017-09-07 NOTE — H&P
Surgery History and Physical    Subjective: Meredith Knight is a 59 y.o. male who presents with right leg tissue loss and pad.     Patient Active Problem List    Diagnosis Date Noted    Skin ulcer of malleolar area of right ankle ([address]     Prolonged Q-T interval on ECG 08/19/2017    Adverse effect of amiodarone 08/19/2017    Status post above knee amputation of left lower extremity ([address] 08/18/2017    Aftercare for amputation stump 08/18/2017    Ischemic rest pain of lower extremity ([address] 08/14/2017    Chronic ulcer of right heel ([address] 08/08/2017    Chronic anemia     Acute blood loss as cause of postoperative anemia 07/25/2017    Aftercare following surgery of the circulatory system 07/25/2017    Impaired mobility and ADLs 07/24/2017    Infection of vascular bypass graft ([address] 07/24/2017    Moderate to severe pulmonary hypertension ([address] 07/23/2017    Pseudoaneurysm of femoral artery ([address] 06/27/2017    History of vitamin D deficiency 04/22/2017    Status post femoral-popliteal bypass surgery 04/21/2017    Anticoagulated on Coumadin     Ischemic cardiomyopathy     Chronic systolic heart failure (HCC)     Carotid artery disease ([address]     Dyslipidemia     History of cardioversion 04/18/2017    Paroxysmal atrial fibrillation ([address]     Critical ischemia of lower extremity 04/10/2017    Euthyroid sick syndrome 04/06/2017    Coronary artery disease involving native coronary artery of native heart 03/15/2017    Aortoiliac occlusive disease ([address] 01/25/2017    Atherosclerosis of native artery of both lower extremities with intermittent claudication ([address] 01/25/2017    Peripheral artery disease ([address] 01/25/2017    Hereditary peripheral neuropathy 11/15/2016    Erectile dysfunction 07/05/2016    Spinal stenosis of lumbar region with radiculopathy 05/04/2015    Benign hypertensive heart disease with systolic congestive heart failure, NYHA class 2 ([address] 01/26/2015    DDD (degenerative disc disease), lumbar 01/26/2015     Past Medical History:   Diagnosis Date    Acute blood loss as cause of postoperative anemia 4/4/2017    Anticoagulated on Coumadin     Aortoiliac occlusive disease ([address] 1/25/2017    Atherosclerosis of native artery of both lower extremities with intermittent claudication ([address] 1/25/2017    Benign hypertensive heart disease with systolic congestive heart failure, NYHA class 2 ([address] 1/26/2015    Carotid artery disease (HCC)     Chronic anemia     Chronic systolic heart failure (HCC)     Chronic ulcer of right foot ([address] 4/4/2017    Chronic ulcer of right heel ([address] 8/8/2017    Coronary artery disease involving native coronary artery of native heart 3/15/2017    Successful stenting of Cx (Xience LESLEY) and RCA (Xience LESLEY) to 0% by Dr. Catherine Nye on 3/15/17.     DDD (degenerative disc disease), lumbar 1/26/2015    Dyslipidemia     Erectile dysfunction 7/5/2016    Euthyroid sick syndrome 4/6/2017    Hereditary peripheral neuropathy 11/15/2016    History of cardioversion 4/18/2017    S/P Synchronized external cardioversion (4/18/2017 - Dr. Anay Miller)    History of vitamin D deficiency 4/22/2017    Vitamin D 25-Hydroxy (4/22/2017) = 12.0; Vitamin D 25-Hydroxy (5/26/2017) = 38.7    Infection of vascular bypass graft ([address] 7/24/2017    Left lower extremity    Ischemic cardiomyopathy     Moderate to severe pulmonary hypertension ([address] 7/23/2017    Paroxysmal atrial fibrillation (HCC)     Peripheral artery disease ([address] 1/25/2017    Skin ulcer of malleolar area of right ankle ([address]     Spinal stenosis of lumbar region with radiculopathy 5/4/2015    Dr. Haylee Jones      Past Surgical History:   Procedure Laterality Date    CARDIAC CATHETERIZATION  3/8/2017         CARDIAC CATHETERIZATION  3/15/2017         CORONARY STENT SINGLE W/PTCA  3/15/2017         HX ABOVE KNEE AMPUTATION Left 08/18/2017    S/P Left above-the-knee amputation (8/18/2017 - Dr. Mirna Haas Miguel)    HX ATHERECTOMY Left 06/15/2017    S/P Atherectomy and balloon angioplasty of the left superficial femoral artery and above-knee popliteal artery (6/15/2017 - Dr. Nando Weaver)    HX COLONOSCOPY  07/23/2015    polyps repeat 2020 5 years [place] [address] Right 04/04/2017    S/P Right axillary to bifemoral artery bypass using an 8 mm Propaten graft from the right axilla to the right common femoral artery with a jump femoral-femoral bypass with another 8 mm Propaten external ring bypass; Right common femoral artery, and profunda femoris artery and superficial femoral artery endarterectomy causing an extensive surgery on the right side (4/4/2017 - Dr. Juan Juan)    [address] Right 04/07/2017    S/P Cutdown of femoral-femoral bypass, more on the left groin side; Right lower extremity angiography with first-order catheterization (4/7/2017 - Dr. Nando Weaver)    HX FEMORAL BYPASS Right 04/10/2017    S/P Right femoral to above-knee popliteal bypass with an 8 mm PTFE graft (4/10/2017 - Dr. Tiffany Herrera)    HX OTHER SURGICAL Left 07/25/2017    S/P Removal of infected graft; Repair of left superficial femoral artery; Repair of left common femoral artery (7/25/2017 - Dr. Nando Weaver)    HX OTHER SURGICAL Right 06/27/2017    S/P Drainage of right side abscess (6/27/2017 - Dr. Nando Weaver)    HX OTHER SURGICAL Left 07/01/2017    S/P Left groin exploration; Left femoral repair pseudoaneurysm; Left wound washout; Wound VAC placement (7/01/2017 - Dr. Nando Weaver)    HX OTHER SURGICAL Left 07/04/2017    S/P Left common femoral artery ligation; Jump bypass from right to left fem-fem to a more distal superficial femoral artery using 8 mm external ringed Propaten graft;  Left groin wound exploration and washout (7/4/2017 - Dr. Nando Weaver)    HX OTHER SURGICAL Right 08/18/2017    S/P Right axillary femoral jump bypass interposition; Removal of infected right axillary femoral bypass; Wound VAC placement of upper thigh 1.2 cm x 5.2 cm x 1.8 cm and groin 4 cm x 0.5 cm x 0.2 cm (8/18/2017 - Dr. Cait Garcia)      Social History   Substance Use Topics    Smoking status: Former Smoker    Smokeless tobacco: Never Used      Comment: quit in 2011    Alcohol use 1.2 oz/week     2 Cans of beer per week      Comment: 10 cans of beer a month      Family History   Problem Relation Age of Onset    Heart Disease Father       Prior to Admission medications    Medication Sig Start Date End Date Taking? Authorizing Provider   warfarin (COUMADIN) 5 mg tablet Take 1 Tab by mouth nightly. 8/25/17  Yes JUNE Rincon   ampicillin-sulbactam 3 gram 3 g IVPB 3 g by IntraVENous route every six (6) hours. 8/25/17  Yes JUNE Rincon   vancomycin (VANCOCIN) 1,000 mg injection As per ID and based on vanc levels 8/25/17  Yes JUNE Rincon   metoprolol tartrate (LOPRESSOR) 25 mg tablet Take 1 Tab by mouth every twelve (12) hours. Indications: hypertension, VENTRICULAR RATE CONTROL IN ATRIAL FIBRILLATION 8/24/17  Yes Chante Luo MD   potassium chloride (KLOR-CON) 20 mEq packet Take 1 Packet by mouth daily. 8/24/17  Yes Chante Luo MD   gabapentin (NEURONTIN) 300 mg capsule Take 1 Cap by mouth every eight (8) hours. Indications: NEUROPATHIC PAIN 8/16/17  Yes Renetta Nagel MD   lactobacillus sp. 50 billion cpu (BIO-K PLUS) 50 billion cell -375 mg cap capsule Take 1 Cap by mouth daily. 8/16/17  Yes Renetta Nagel MD   losartan (COZAAR) 50 mg tablet Take 1 Tab by mouth daily. Indications: Chronic Heart Failure, hypertension 8/16/17  Yes Renetta Nagel MD   magnesium oxide (MAG-OX) 400 mg tablet Take 1 Tab by mouth daily. 8/16/17  Yes Renetta Nagel MD   docusate sodium (COLACE) 100 mg capsule Take 1 Cap by mouth daily for 90 days.  8/7/17 11/5/17 Yes JUNE Moncada   furosemide (LASIX) 20 mg tablet 1 tablet daily 8/7/17  Yes Veronica Moncada polyethylene glycol (MIRALAX) 17 gram packet Take 1 Packet by mouth daily. 17  Yes [address] [place] [address]   amiodarone (CORDARONE) 200 mg tablet Take 1 Tab by mouth daily. Indications: VENTRICULAR RATE CONTROL IN ATRIAL FIBRILLATION 17  Yes Perry Ruiz MD   aspirin 81 mg chewable tablet Take 1 Tab by mouth daily (with breakfast). 17  Yes Brenda Dangelo MD   cholecalciferol (VITAMIN D3) 1,000 unit tablet Take 1 Tab by mouth daily. 17  Yes Brenda Dangelo MD   ascorbic acid, vitamin C, (VITAMIN C) 250 mg tablet Take 1 Tab by mouth daily (with breakfast). 17  Yes Brenda Dangelo MD   zinc sulfate (ZINCATE) 220 (50) mg capsule Take 1 Cap by mouth daily. 17  Yes Brenda Dangelo MD   ferrous sulfate 325 mg (65 mg iron) tablet Take 1 Tab by mouth daily (with breakfast). 17  Yes Brenda Dangelo MD   atorvastatin (LIPITOR) 40 mg tablet Take 1 Tab by mouth nightly. Indications: DYSLIPIDEMIA 17  Yes Brenda Dangelo MD   cyanocobalamin (VITAMIN B-12) 1,000 mcg tablet Take 1 Tab by mouth daily. 10/6/16  Yes Brenda Dangelo MD     Allergies   Allergen Reactions    Morphine Other (comments)     Patient gets confused with morphine. ROS:  Pertinent items are noted in HPI. Unless otherwise mentioned in the HPI. Objective:     Patient Vitals for the past 8 hrs:   BP Temp Pulse Resp SpO2 Height Weight   17 1157 - - - - - 5' 10\" (1.778 m) 156 lb (70.8 kg)   17 1145 148/64 - 61 25 100 % - -   17 0754 125/69 97 °F (36.1 °C) 62 20 100 % - -       Temp (24hrs), Av.1 °F (36.7 °C), Min:97 °F (36.1 °C), Max:98.7 °F (37.1 °C)      Physical Exam:  GENERAL: alert, cooperative, no distress, appears stated age, THROAT & NECK: normal and no erythema or exudates noted. , LUNG: clear to auscultation bilaterally, HEART: regular rate and rhythm, S1, S2 normal, no murmur, click, rub or gallop, ABDOMEN: soft, non-tender.  Bowel sounds normal. No masses,  no organomegaly    Labs:   Recent Results (from the past 24 hour(s))   HGB & HCT    Collection Time: 09/07/17  6:15 AM   Result Value Ref Range    HGB 9.4 (L) 13.0 - 16.0 g/dL    HCT 28.8 (L) 36.0 - 48.0 %   PROTHROMBIN TIME + INR    Collection Time: 09/07/17  6:15 AM   Result Value Ref Range    Prothrombin time 18.3 (H) 11.5 - 15.2 sec    INR 1.6 (H) 0.8 - 1.2     PLATELET COUNT    Collection Time: 09/07/17  6:15 AM   Result Value Ref Range    PLATELET 038 (H) 158 - 420 K/uL       Data Review:    CBC:   Lab Results   Component Value Date/Time    WBC 10.7 09/03/2017 03:45 AM    RBC 2.95 09/03/2017 03:45 AM    HGB 9.4 09/07/2017 06:15 AM    HCT 28.8 09/07/2017 06:15 AM    PLATELET 241 04/15/2393 06:15 AM      BMP:   Lab Results   Component Value Date/Time    Glucose 84 09/04/2017 07:26 AM    Sodium 134 09/04/2017 07:26 AM    Potassium 4.3 09/04/2017 07:26 AM    Chloride 99 09/04/2017 07:26 AM    CO2 27 09/04/2017 07:26 AM    BUN 19 09/04/2017 07:26 AM    Creatinine 0.73 09/04/2017 07:26 AM    Calcium 9.0 09/04/2017 07:26 AM     Coagulation:   Lab Results   Component Value Date/Time    Prothrombin time 18.3 09/07/2017 06:15 AM    INR 1.6 09/07/2017 06:15 AM    aPTT 40.7 08/19/2017 06:00 AM       Assessment:     Principal Problem:    Status post above knee amputation of left lower extremity ([facility] (8/18/2017)      Overview: S/P Left above-the-knee amputation (8/18/2017 - Dr. Polly Irwin)          Active Problems:    Benign hypertensive heart disease with systolic congestive heart failure, NYHA class 2 ([facility] (1/26/2015)      Peripheral artery disease ([facility] (1/25/2017)      Paroxysmal atrial fibrillation (HCC) ()      Acute blood loss as cause of postoperative anemia (7/25/2017)      Impaired mobility and ADLs (7/24/2017)      Anticoagulated on Coumadin ()      Aftercare following surgery of the circulatory system (7/25/2017)      Overview: S/P Removal of infected graft; Repair of left superficial femoral artery; Repair of left common femoral artery (7/25/2017 - Dr. Javier Velarde); S/P Right axillary femoral jump bypass interposition; Removal of       infected right axillary femoral bypass; Wound VAC placement of upper thigh       1.2 cm x 5.2 cm x 1.8 cm and groin 4 cm x 0.5 cm x 0.2 cm (8/18/2017 - Dr. Kody Daugherty)      Infection of vascular bypass graft ([address] (7/24/2017)      Overview: Left lower extremity      Chronic ulcer of right heel ([address] (8/8/2017)      Ischemic rest pain of lower extremity ([address] (8/14/2017)      Overview: Left      Aftercare for amputation stump (8/18/2017)      Overview: S/P Left above-the-knee amputation (8/18/2017 - Dr. Kody Daugherty)      Skin ulcer of malleolar area of right ankle ([address] ()        Plan:     Right leg angiogram with intervention.     Signed By: Angélica Neal MD     September 7, 2017

## 2017-09-07 NOTE — PROGRESS NOTES
Cath holding summary    Patient transferred to cath [facility] from Rehab, alert and oriented x 4, voicing no complaints. Placed on monitor, NPO since MN. Lab results, med rec and H&P reviewed on chart. 1505 patient arrived to cath holding from cath lab, awake and alert, vital signs stable, right groin, clean dry and intact. No C/O of pain will continue to monitor. 1545 TRANSFER - OUT REPORT:    Verbal report given to Verl Confer on Dorothea Allen  being transferred to [facility] or routine progression of care       Report consisted of patients Situation, Background, Assessment and   Recommendations(SBAR). Information from the following report(s) SBAR was reviewed with the receiving nurse. Lines:   PICC Double Lumen 60/59/36 Right;Basilic (Active)   Central Line Being Utilized Yes 9/6/2017  8:00 AM   Criteria for Appropriate Use Long term IV/antibiotic administration 9/6/2017  8:00 AM   Site Assessment Clean, dry, & intact 9/6/2017  8:00 AM   Phlebitis Assessment 0 9/6/2017  8:00 AM   Infiltration Assessment 0 9/6/2017  8:00 AM   Arm Circumference (cm) 24 cm 8/25/2017  1:30 PM   Date of Last Dressing Change 09/05/17 9/6/2017  8:00 AM   Dressing Status Clean, dry, & intact 9/6/2017  8:00 AM   Action Taken Dressing changed 9/5/2017  9:00 AM   External Catheter Length (cm) 0 centimeters 8/24/2017 10:00 AM   Dressing Type Disk with Chlorhexadine gluconate (CHG); Transparent 9/6/2017  8:00 AM   Hub Color/Line Status Purple;Flushed;Patent 9/6/2017  8:00 AM   Positive Blood Return (Site #1) Yes 9/6/2017  8:00 AM   Hub Color/Line Status Red; Infusing;Patent 9/6/2017  8:00 AM   Positive Blood Return (Site #2) Yes 9/6/2017  8:00 AM   Alcohol Cap Used No 9/6/2017  8:00 AM        Opportunity for questions and clarification was provided.       Patient transported with:   Registered Nurse        1600 Right groin site clean dry and intact, checked with Boom Hernandez

## 2017-09-07 NOTE — HOME CARE
Notified by CM of plan for d/c home tomorrow - noted orders for IV abx thru 9/19 - per CM, pt has new insurance - sent information to HCP - pt has met $300 deductable and has 100% coverage for IV abx at home - HCP nurse put on hold for d/c tomorrow - they will come to ARU to hook pt up to [employer] pump - sometime between 9 AM and 3 PM - will have a better time in the AM - left Alex SALAMANCA about this - spoke to daughter Uli Guzman - she confirmed her address that pt will go home to - informed her about coverage and expected time - will know more tomorrow AM about time expected - will call her back in AM to confirm time that pt will be ready - plan for d/c tomorrow AM - CARROLL Hall RN

## 2017-09-07 NOTE — REHAB NOTE
SHIFT CHANGE NOTE FOR [facility]    Bedside and Verbal shift change report given to  Alton Grande, RN (oncoming nurse) by Cynthia Samuel RN   (offgoing nurse). Report included the following information SBAR, Kardex, MAR and Recent Results. Situation:   Code Status: Full Code   Reason for Admission: left AKA  Hospital Day: 13   Problem List:   Hospital Problems  Date Reviewed: 9/6/2017          Codes Class Noted POA    Skin ulcer of malleolar area of right ankle ([facility] (Chronic) ICD-10-CM: L97.319  ICD-9-CM: 707.13  Unknown Yes        * (Principal)Status post above knee amputation of left lower extremity ([facility] ICD-10-CM: Z58.937  ICD-9-CM: V49.76  8/18/2017 Yes    Overview Signed 8/21/2017 10:21 PM by Ramón Rivas MD     S/P Left above-the-knee amputation (8/18/2017 - Dr. Jayla Mondragon)               Aftercare for amputation stump ICD-10-CM: Z47.81  ICD-9-CM: V54.89  8/18/2017 Yes    Overview Signed 8/21/2017 10:25 PM by Ramón Rivas MD     S/P Left above-the-knee amputation (8/18/2017 - Dr. Jayla Mondragon)             Ischemic rest pain of lower extremity ([facility] ICD-10-CM: I73.9  ICD-9-CM: 443.9  8/14/2017 Yes    Overview Signed 8/15/2017 11:09 AM by Ramón Rivas MD     Left             Chronic ulcer of right heel ([facility] (Chronic) ICD-10-CM: L97.419  ICD-9-CM: 707.14  8/8/2017 Yes        Acute blood loss as cause of postoperative anemia ICD-10-CM: D62  ICD-9-CM: 285.1  7/25/2017 Yes        Aftercare following surgery of the circulatory system ICD-10-CM: Z48.812  ICD-9-CM: V58.73  7/25/2017 Yes    Overview Addendum 8/21/2017 10:22 PM by Ramón Rivas MD     S/P Removal of infected graft; Repair of left superficial femoral artery; Repair of left common femoral artery (7/25/2017 - Dr. Jayla Mondragon); S/P Right axillary femoral jump bypass interposition; Removal of infected right axillary femoral bypass;  Wound VAC placement of upper thigh 1.2 cm x 5.2 cm x 1.8 cm and groin 4 cm x 0.5 cm x 0.2 cm (8/18/2017 - Dr. Areli Canela)             Impaired mobility and ADLs ICD-10-CM: Z74.09  ICD-9-CM: 799.89  7/24/2017 Yes        Infection of vascular bypass graft [facility]) ICD-10-CM: T82. 7XXA  ICD-9-CM: 996.62  7/24/2017 Yes    Overview Signed 8/7/2017  2:19 PM by Gonzalez Milligan MD     Left lower extremity             Anticoagulated on Coumadin (Chronic) ICD-10-CM: Z51.81, Z79.01  ICD-9-CM: V58.83, V58.61  Unknown Yes        Paroxysmal atrial fibrillation (HCC) ICD-10-CM: I48.0  ICD-9-CM: 427.31  Unknown Yes        Peripheral artery disease ([facility] (Chronic) ICD-10-CM: I73.9  ICD-9-CM: 443.9  1/25/2017 Yes        Benign hypertensive heart disease with systolic congestive heart failure, NYHA class 2 (HCC) (Chronic) ICD-10-CM: I11.0, I50.20  ICD-9-CM: 402.11, 428.20, 428.0  1/26/2015 Yes              Background:   Past Medical History:   Past Medical History:   Diagnosis Date    Acute blood loss as cause of postoperative anemia 4/4/2017    Anticoagulated on Coumadin     Aortoiliac occlusive disease ([address] 1/25/2017    Atherosclerosis of native artery of both lower extremities with intermittent claudication ([facility] 1/25/2017    Benign hypertensive heart disease with systolic congestive heart failure, NYHA class 2 ([address] 1/26/2015    Carotid artery disease (HCC)     Chronic anemia     Chronic systolic heart failure (HCC)     Chronic ulcer of right foot ([address] 4/4/2017    Chronic ulcer of right heel ([address] 8/8/2017    Coronary artery disease involving native coronary artery of native heart 3/15/2017    Successful stenting of Cx (Xience LESLEY) and RCA (Xience LESLEY) to 0% by Dr. Imelda De La Cruz on 3/15/17.     DDD (degenerative disc disease), lumbar 1/26/2015    Dyslipidemia     Erectile dysfunction 7/5/2016    Euthyroid sick syndrome 4/6/2017    Hereditary peripheral neuropathy 11/15/2016    History of cardioversion 4/18/2017    S/P Synchronized external cardioversion (4/18/2017 - Dr. Ariane Curiel)    History of vitamin D deficiency 4/22/2017    Vitamin D 25-Hydroxy (4/22/2017) = 12.0; Vitamin D 25-Hydroxy (5/26/2017) = 38.7    Infection of vascular bypass graft ([facility] 7/24/2017    Left lower extremity    Ischemic cardiomyopathy     Moderate to severe pulmonary hypertension ([facility] 7/23/2017    Paroxysmal atrial fibrillation (HCC)     Peripheral artery disease ([facility] 1/25/2017    Skin ulcer of malleolar area of right ankle ([facility]     Spinal stenosis of lumbar region with radiculopathy 5/4/2015    Dr. Keeann Padilla      Patient taking anticoagulants no    Patient has a defibrillator: no     Assessment:   Changes in Assessment throughout shift: no     Patient has central line: yes Reasons if yes: long term antibiotic  Insertion date:08/24/17 Last dressing date:08/05/17   Patient has Jiang Cath: no Reasons if yes:     Insertion date:     Last Vitals:     Vitals:    09/06/17 0800 09/06/17 0920 09/06/17 1613 09/06/17 2000   BP: 142/72 142/72 124/67 136/73   Pulse: 71 71 64 67   Resp: 18  18 16   Temp: 97.1 °F (36.2 °C)  98.5 °F (36.9 °C) 98.7 °F (37.1 °C)   SpO2: 100%  100% 98%   Weight:       Height:            PAIN    Pain Assessment    Pain Intensity 1: 0 (09/07/17 0400) Pain Intensity 1: 2 (12/29/14 1105)    Pain Location 1: Foot Pain Location 1: Abdomen    Pain Intervention(s) 1: Medication (see MAR) Pain Intervention(s) 1: Medication (see MAR)  Patient Stated Pain Goal: 0 Patient Stated Pain Goal: 0  o Intervention effective: no   o Other actions taken for pain: pain medicine     Skin Assessment  Skin color Skin Color: Appropriate for ethnicity  Condition/Temperature Skin Condition/Temp: Dry, Warm  Integrity Skin Integrity: Incision (comment)  Turgor Turgor: Non-tenting  Weekly Pressure Ulcer Documentation  Pressure  Injury Documentation: No Pressure Injury Noted-Pressure Ulcer Prevention Initiated  Wound Prevention & Protection Methods  Orientation of wound Orientation of Wound Prevention: Posterior  Location of Prevention Location of Wound Prevention: Sacrum/Coccyx  Dressing Present Dressing Present : No  Dressing Status Dressing Status: Intact  Wound Offloading Wound Offloading (Prevention Methods): Bed, pressure redistribution/air     INTAKE/OUPUT    Date 09/06/17 0700 - 09/07/17 0659 09/07/17 0700 - 09/08/17 0659   Shift 3804-4876 4412-0217 24 Hour Total 8089-7521 3995-8989 24 Hour Total   I  N  T  A  K  E   P. O. 720  720         P. O. 720  720       Shift Total  (mL/kg) 720  (10.2)  720  (10.2)      O  U  T  P  U  T   Urine  (mL/kg/hr) 2200  (2.6) 1400  (1.6) 3600  (2.1)         Urine Voided 2200 1400 3600         Urine Occurrence(s) 6 x 3 x 9 x       Stool            Stool Occurrence(s) 1 x 1 x 2 x       Shift Total  (mL/kg) 2200  (31.1) 1400  (19.8) 3600  (50.8)      NET -1480 -1400 -2880      Weight (kg) 70.8 70.8 70.8 70.8 70.8 70.8       Recommendations:  1. Patient needs and requests: pain medicine    2. Diet: cardiac    3. Pending tests/procedures: no     4. Functional Level/Equipment: 1 x person assist    5. Estimated Discharge Date: TDB Posted on Whiteboard in Patients Room: no     [facility] Safety Check    A safety check occurred in the patient's room between off going nurse and oncoming nurse listed above. The safety check included the below items  Area Items   H  High Alert Medications - Verify all high alert medication drips (heparin, PCA, etc.)   E  Equipment - Suction is set up for ALL patients (with yanker)  - Red plugs utilized for all equipment (IV pumps, etc.)  - WOWs wiped down at end of shift.  - Room stocked with oxygen, suction, and other unit-specific supplies   A  Alarms - Bed alarm is set for fall risk patients  - Ensure chair alarm is in place and activated if patient is up in a chair   L  Lines - Check IV for any infiltration  - Jiang bag is empty if patient has a Jiang   - Tubing and IV bags are labeled   S  Safety   - Room is clean, patient is clean, and equipment is clean.   - Hallways are clear from equipment besides carts. - Fall bracelet on for fall risk patients  - Ensure room is clear and free of clutter  - Suction is set up for ALL patients (with cailin)  - Hallways are clear from equipment besides carts.    - Isolation precautions followed, supplies available outside room, sign posted

## 2017-09-07 NOTE — REHAB NOTE
SHIFT CHANGE NOTE FOR [facility]    Bedside and Verbal shift change report given to  Wendy Camacho, BEAU (oncoming nurse) by Shaheed Reeves RN   (offgoing nurse). Report included the following information SBAR, Kardex, MAR and Recent Results. Situation:   Code Status: Full Code   Reason for Admission: left AKA  Hospital Day: 13   Problem List:   Hospital Problems  Date Reviewed: 9/7/2017          Codes Class Noted POA    Skin ulcer of malleolar area of right ankle ([facility] (Chronic) ICD-10-CM: L97.319  ICD-9-CM: 707.13  Unknown Yes        * (Principal)Status post above knee amputation of left lower extremity ([facility] ICD-10-CM: G19.323  ICD-9-CM: V49.76  8/18/2017 Yes    Overview Signed 8/21/2017 10:21 PM by Juwan Mckeon MD     S/P Left above-the-knee amputation (8/18/2017 - Dr. Brandt Jennings)               Aftercare for amputation stump ICD-10-CM: Z47.81  ICD-9-CM: V54.89  8/18/2017 Yes    Overview Signed 8/21/2017 10:25 PM by Juwan Mckeon MD     S/P Left above-the-knee amputation (8/18/2017 - Dr. Brandt Jennings)             Ischemic rest pain of lower extremity ([facility] ICD-10-CM: I73.9  ICD-9-CM: 443.9  8/14/2017 Yes    Overview Signed 8/15/2017 11:09 AM by Juwan Mckeon MD     Left             Chronic ulcer of right heel ([facility] (Chronic) ICD-10-CM: L97.419  ICD-9-CM: 707.14  8/8/2017 Yes        Acute blood loss as cause of postoperative anemia ICD-10-CM: D62  ICD-9-CM: 285.1  7/25/2017 Yes        Aftercare following surgery of the circulatory system ICD-10-CM: Z48.812  ICD-9-CM: V58.73  7/25/2017 Yes    Overview Addendum 8/21/2017 10:22 PM by Juwan Mckeon MD     S/P Removal of infected graft; Repair of left superficial femoral artery; Repair of left common femoral artery (7/25/2017 - Dr. Brandt Jennings); S/P Right axillary femoral jump bypass interposition; Removal of infected right axillary femoral bypass;  Wound VAC placement of upper thigh 1.2 cm x 5.2 cm x 1.8 cm and groin 4 cm x 0.5 cm x 0.2 cm (8/18/2017 - Dr. Malu Chahal)             Impaired mobility and ADLs ICD-10-CM: Z74.09  ICD-9-CM: 799.89  7/24/2017 Yes        Infection of vascular bypass graft [facility]) ICD-10-CM: T82. 7XXA  ICD-9-CM: 996.62  7/24/2017 Yes    Overview Signed 8/7/2017  2:19 PM by Yoseph Montano MD     Left lower extremity             Anticoagulated on Coumadin (Chronic) ICD-10-CM: Z51.81, Z79.01  ICD-9-CM: V58.83, V58.61  Unknown Yes        Paroxysmal atrial fibrillation (HCC) ICD-10-CM: I48.0  ICD-9-CM: 427.31  Unknown Yes        Peripheral artery disease ([facility] (Chronic) ICD-10-CM: I73.9  ICD-9-CM: 443.9  1/25/2017 Yes        Benign hypertensive heart disease with systolic congestive heart failure, NYHA class 2 (HCC) (Chronic) ICD-10-CM: I11.0, I50.20  ICD-9-CM: 402.11, 428.20, 428.0  1/26/2015 Yes              Background:   Past Medical History:   Past Medical History:   Diagnosis Date    Acute blood loss as cause of postoperative anemia 4/4/2017    Anticoagulated on Coumadin     Aortoiliac occlusive disease ([address] 1/25/2017    Atherosclerosis of native artery of both lower extremities with intermittent claudication ([address] 1/25/2017    Benign hypertensive heart disease with systolic congestive heart failure, NYHA class 2 ([address] 1/26/2015    Carotid artery disease (HCC)     Chronic anemia     Chronic systolic heart failure (HCC)     Chronic ulcer of right foot ([address] 4/4/2017    Chronic ulcer of right heel ([address] 8/8/2017    Coronary artery disease involving native coronary artery of native heart 3/15/2017    Successful stenting of Cx (Xience LESLEY) and RCA (Xience LESLEY) to 0% by Dr. Abe Robbins on 3/15/17.     DDD (degenerative disc disease), lumbar 1/26/2015    Dyslipidemia     Erectile dysfunction 7/5/2016    Euthyroid sick syndrome 4/6/2017    Hereditary peripheral neuropathy 11/15/2016    History of cardioversion 4/18/2017    S/P Synchronized external cardioversion (4/18/2017 - Dr. Michelle Winter)    History of vitamin D deficiency 4/22/2017    Vitamin D 25-Hydroxy (4/22/2017) = 12.0; Vitamin D 25-Hydroxy (5/26/2017) = 38.7    Infection of vascular bypass graft ([facility] 7/24/2017    Left lower extremity    Ischemic cardiomyopathy     Moderate to severe pulmonary hypertension ([facility] 7/23/2017    Paroxysmal atrial fibrillation (HCC)     Peripheral artery disease ([facility] 1/25/2017    Skin ulcer of malleolar area of right ankle ([facility]     Spinal stenosis of lumbar region with radiculopathy 5/4/2015    Dr. Paul Cárdenas      Patient taking anticoagulants no    Patient has a defibrillator: no     Assessment:   Changes in Assessment throughout shift: no     Patient has central line: yes Reasons if yes: long term antibiotic  Insertion date:08/24/17 Last dressing date:08/05/17   Patient has Jiang Cath: no Reasons if yes:     Insertion date:     Last Vitals:     Vitals:    09/07/17 1505 09/07/17 1520 09/07/17 1549 09/07/17 1651   BP: 150/72 137/68 149/68 137/65   Pulse: 60 (!) 59 (!) 59 66   Resp: 20 14 16 20   Temp:    97.9 °F (36.6 °C)   SpO2: 100% 99% 97% 100%   Weight:       Height:            PAIN    Pain Assessment    Pain Intensity 1: 1 (09/07/17 1715) Pain Intensity 1: 2 (12/29/14 1105)    Pain Location 1: Leg Pain Location 1: Abdomen    Pain Intervention(s) 1: Medication (see MAR) Pain Intervention(s) 1: Medication (see MAR)  Patient Stated Pain Goal: 0 Patient Stated Pain Goal: 0  o Intervention effective: no   o Other actions taken for pain: pain medicine     Skin Assessment  Skin color Skin Color: Appropriate for ethnicity  Condition/Temperature Skin Condition/Temp: Warm  Integrity Skin Integrity: Incision (comment), Wound (add Wound LDA)  Turgor Turgor: Non-tenting  Weekly Pressure Ulcer Documentation  Pressure  Injury Documentation: No Pressure Injury Noted-Pressure Ulcer Prevention Initiated  Wound Prevention & Protection Methods  Orientation of wound Orientation of Wound Prevention: Posterior  Location of Prevention Location of Wound Prevention: Sacrum/Coccyx  Dressing Present Dressing Present : Yes  Dressing Status Dressing Status: Intact  Wound Offloading Wound Offloading (Prevention Methods): Bed, pressure redistribution/air     INTAKE/OUPUT    Date 09/06/17 0700 - 09/07/17 0659 09/07/17 0700 - 09/08/17 0659   Shift 0700-1859 1900-0659 24 Hour Total 0700-1859 1900-0659 24 Hour Total   I  N  T  A  K  E   P. O. 720  720         P. O. 720  720       I.V.  (mL/kg/hr)    150  150      I.V.    150  150    Shift Total  (mL/kg) 720  (10.2)  720  (10.2) 150  (2.1)  150  (2.1)   O  U  T  P  U  T   Urine  (mL/kg/hr) 2200  (2.6) 1400  (1.6) 3600  (2.1)         Urine Voided 2200 1400 3600         Urine Occurrence(s) 6 x 3 x 9 x 6 x  6 x    Stool            Stool Occurrence(s) 1 x 1 x 2 x 1 x  1 x    Shift Total  (mL/kg) 2200  (31.1) 1400  (19.8) 3600  (50.8)      NET -1480 -1400 -2880 150  150   Weight (kg) 70.8 70.8 70.8 70.8 70.8 70.8       Recommendations:  1. Patient needs and requests: pain medicine    2. Diet: cardiac    3. Pending tests/procedures: no     4. Functional Level/Equipment: 1 x person assist    5. Estimated Discharge Date: TDB Posted on Whiteboard in Patients Room: [facility] Safety Check    A safety check occurred in the patient's room between off going nurse and oncoming nurse listed above.     The safety check included the below items  Area Items   H  High Alert Medications - Verify all high alert medication drips (heparin, PCA, etc.)   E  Equipment - Suction is set up for ALL patients (with yanker)  - Red plugs utilized for all equipment (IV pumps, etc.)  - WOWs wiped down at end of shift.  - Room stocked with oxygen, suction, and other unit-specific supplies   A  Alarms - Bed alarm is set for fall risk patients  - Ensure chair alarm is in place and activated if patient is up in a chair   L  Lines - Check IV for any infiltration  - Jiang bag is empty if patient has a Jiang   - Tubing and IV bags are labeled   S  Safety   - Room is clean, patient is clean, and equipment is clean. - Hallways are clear from equipment besides carts. - Fall bracelet on for fall risk patients  - Ensure room is clear and free of clutter  - Suction is set up for ALL patients (with cailin)  - Hallways are clear from equipment besides carts.    - Isolation precautions followed, supplies available outside room, sign posted

## 2017-09-07 NOTE — ROUTINE PROCESS
TRANSFER - OUT REPORT:    Verbal report given to ROEL Heredia RN(name) on Meliton Fox  being transferred to [facility](unit) for routine post - op       Report consisted of patients Situation, Background, Assessment and   Recommendations(SBAR). Information from the following report(s) SBAR, Kardex, Procedure Summary and MAR was reviewed with the receiving nurse. Lines:   PICC Double Lumen 18/14/83 Right;Basilic (Active)   Central Line Being Utilized Yes 9/6/2017  8:00 AM   Criteria for Appropriate Use Long term IV/antibiotic administration 9/6/2017  8:00 AM   Site Assessment Clean, dry, & intact 9/6/2017  8:00 AM   Phlebitis Assessment 0 9/6/2017  8:00 AM   Infiltration Assessment 0 9/6/2017  8:00 AM   Arm Circumference (cm) 24 cm 8/25/2017  1:30 PM   Date of Last Dressing Change 09/05/17 9/6/2017  8:00 AM   Dressing Status Clean, dry, & intact 9/6/2017  8:00 AM   Action Taken Dressing changed 9/5/2017  9:00 AM   External Catheter Length (cm) 0 centimeters 8/24/2017 10:00 AM   Dressing Type Disk with Chlorhexadine gluconate (CHG); Transparent 9/6/2017  8:00 AM   Hub Color/Line Status Purple;Flushed;Patent 9/6/2017  8:00 AM   Positive Blood Return (Site #1) Yes 9/6/2017  8:00 AM   Hub Color/Line Status Red; Infusing;Patent 9/6/2017  8:00 AM   Positive Blood Return (Site #2) Yes 9/6/2017  8:00 AM   Alcohol Cap Used No 9/6/2017  8:00 AM        Opportunity for questions and clarification was provided.       Patient transported with:   [employer]

## 2017-09-07 NOTE — PROGRESS NOTES
Pt is to dc to his daughter's home tomorrow ([address], [place], [address]) and can still be reached via his cell at this location. Sw provided [address] and pt's daughter states that the pt was active with [facility] and they wish to remain with the agency. Dary notified the liaisons. Dary provided information to [employer] for a wheelchair and bedside commode. Dary advised all above providers that pt is no longer active with [employer] and [employer] but is no covered with [employer] ID # [id number] and Group # [id number]  Phone number [phone]. Admitting has been notified to update pt's insurance information in the record and dary has initiated an auth for ARU admission (Ref# [id number]).

## 2017-09-07 NOTE — PROGRESS NOTES
[address]  [address] [place] [address], [address]     INPATIENT REHABILITATION  DAILY PROGRESS NOTE     Date: 9/7/2017    Name: Iesha Arenas Age / Sex: 59 y.o. / male   CSN: [id number] MRN: [id number]   [facility] Date: 8/25/2017 Length of Stay: 13 days     Primary Rehab Diagnosis: Impaired Mobility and ADLs secondary to:  1. S/P Left above-the-knee amputation (8/18/2017 - Dr. Kody Daugherty)  3. History of ischemic rest pain of the left lower extremity due to severe peripheral vascular disease       Subjective:     Patient seen and examined. Blood pressure controlled. Medications were not given this AM because patient was NPO per Vascular Surgery for angiogram of the RLE. Patient's Complaint:   No significant medical complaints    Pain Control: stable, mild-to-moderate joint symptoms intermittently, reasonably well controlled by current meds      Objective:     Vital Signs:  Patient Vitals for the past 24 hrs:   BP Temp Pulse Resp SpO2 Height Weight   09/07/17 1157 - - - - - 5' 10\" (1.778 m) 70.8 kg (156 lb)   09/07/17 1145 148/64 - 61 25 100 % - -   09/07/17 0754 125/69 97 °F (36.1 °C) 62 20 100 % - -   09/06/17 2000 136/73 98.7 °F (37.1 °C) 67 16 98 % - -   09/06/17 1613 124/67 98.5 °F (36.9 °C) 64 18 100 % - -        Physical Examination:  GENERAL SURVEY: Patient is awake, alert, oriented x 3, sitting comfortably on the chair, not in acute respiratory distress.   HEENT: pale palpebral conjunctivae, anicteric sclerae, no nasoaural discharge, moist oral mucosa  NECK: supple, no jugular venous distention, no palpable lymph nodes  CHEST/LUNGS: symmetrical chest expansion, good air entry, clear breath sounds  HEART: adynamic precordium, good S1 S2, no S3, regular rhythm, no murmurs  ABDOMEN: flat, bowel sounds appreciated, soft, non-tender  EXTREMITIES: (+) left AKA stump with dressing, (+) ~1 cm x ~1 cm ulcer on right heel, (+) 1.5 cm x 1.5 cm wound above lateral malleolus of right ankle, pale nailbeds, no edema, full and equal pulses, no calf tenderness   NEUROLOGICAL EXAM: The patient is awake, alert and oriented x3, able to answer questions fairly appropriately, able to follow 1 and 2 step commands. Able to tell time from the wall clock. Cranial nerves II-XII are grossly intact. No gross sensory deficit.   Motor strength is 4+/5 on BUE, 4+/5 on the RLE, 3+/5 on the left hip, 4-/5 on the left knee and left ankle.     Incision(s): healing well, clean, dry, and intact       Current Medications:  Current Facility-Administered Medications   Medication Dose Route Frequency    heparinized saline 2 units/mL infusion 1,000 Units  1,000 Units Irrigation ONCE    heparinized saline 2 units/mL infusion 1,000 Units  1,000 Units Irrigation ONCE    heparin (porcine) 1,000 unit/mL injection 1,000-10,000 Units  1,000-10,000 Units IntraVENous Multiple    fentaNYL citrate (PF) injection  mcg   mcg IntraVENous Multiple    midazolam (VERSED) injection 1-4 mg  1-4 mg IntraVENous Multiple    iopamidol (ISOVUE 300) 61 % contrast injection 100-200 mL  100-200 mL IntraCATHeter RAD ONCE    lidocaine (PF) (XYLOCAINE) 10 mg/mL (1 %) injection 1-30 mL  1-30 mL SubCUTAneous Multiple    verapamil (ISOPTIN) 2.5 mg/mL injection 10 mg  10 mg IntraarTERial ONCE    nitroglycerin (TRIDIL) 400 mcg/ml infusion  5-20 mcg/min IntraVENous TITRATE    losartan (COZAAR) tablet 50 mg  50 mg Oral DAILY    alteplase (CATHFLO) 1 mg in sterile water (preservative free) 1 mL injection  1 mg InterCATHeter PRN    acetaminophen (TYLENOL) tablet 650 mg  650 mg Oral Q4H PRN    docusate sodium (COLACE) capsule 100 mg  100 mg Oral BID    bisacodyl (DULCOLAX) tablet 10 mg  10 mg Oral Q48H PRN    lactobacillus sp. 50 billion cpu (BIO-K PLUS) capsule 1 Cap  1 Cap Oral DAILY    magnesium oxide (MAG-OX) tablet 400 mg  400 mg Oral DAILY    gabapentin (NEURONTIN) capsule 300 mg  300 mg Oral Q8H    furosemide (LASIX) tablet 20 mg  20 mg Oral DAILY    HYDROmorphone (DILAUDID) tablet 2-4 mg  2-4 mg Oral Q4H PRN    potassium chloride (KLOR-CON) packet 20 mEq  20 mEq Oral DAILY    polyethylene glycol (MIRALAX) packet 17 g  17 g Oral DAILY    amiodarone (CORDARONE) tablet 200 mg  200 mg Oral DAILY    aspirin chewable tablet 81 mg  81 mg Oral DAILY WITH BREAKFAST    cholecalciferol (VITAMIN D3) tablet 2,000 Units  2,000 Units Oral DAILY    ferrous sulfate tablet 325 mg  1 Tab Oral DAILY WITH BREAKFAST    ascorbic acid (vitamin C) (VITAMIN C) tablet 250 mg  250 mg Oral DAILY WITH BREAKFAST    zinc sulfate (ZINCATE) capsule 220 mg  1 Cap Oral DAILY    atorvastatin (LIPITOR) tablet 40 mg  40 mg Oral QHS    metoprolol tartrate (LOPRESSOR) tablet 25 mg  25 mg Oral Q12H    cyanocobalamin tablet 1,000 mcg  1,000 mcg Oral DAILY    ampicillin-sulbactam (UNASYN) 3 g in 0.9% sodium chloride (MBP/ADV) 100 mL MBP  3 g IntraVENous Q6H       Allergies: Allergies   Allergen Reactions    Morphine Other (comments)     Patient gets confused with morphine.        Functional Progress:    OCCUPATIONAL THERAPY    ON ADMISSION MOST RECENT   Eating  Functional Level: 6   Eating  Functional Level: 6     Grooming  Functional Level: 7   Grooming  Functional Level: 7     Bathing  Functional Level: 4   Bathing  Functional Level: 4     Upper Body Dressing  Functional Level: 7   Upper Body Dressing  Functional Level: 7     Lower Body Dressing  Functional Level: 4   Lower Body Dressing  Functional Level: 4     Toileting  Functional Level: 3   Toileting  Functional Level: 6     Toilet Transfers  Toilet Transfer Score: 4   Toilet Transfers  Toilet Transfer Score: 5     Tub /Shower Transfers  Tub/Shower Transfer Score: 4   Tub/Shower Transfers  Tub/Shower Transfer Score: 4       Legend:   7 - Independent   6 - Modified Independent   5 - Standby Assistance / Supervision / Set-up   4 - Minimum Assistance / Contact Guard Assistance   3 - Moderate Assistance   2 - Maximum Assistance   1 - Total Assistance / Dependent       Lab/Data Review:  Recent Results (from the past 24 hour(s))   HGB & HCT    Collection Time: 09/07/17  6:15 AM   Result Value Ref Range    HGB 9.4 (L) 13.0 - 16.0 g/dL    HCT 28.8 (L) 36.0 - 48.0 %   PROTHROMBIN TIME + INR    Collection Time: 09/07/17  6:15 AM   Result Value Ref Range    Prothrombin time 18.3 (H) 11.5 - 15.2 sec    INR 1.6 (H) 0.8 - 1.2     PLATELET COUNT    Collection Time: 09/07/17  6:15 AM   Result Value Ref Range    PLATELET 845 (H) 350 - 420 K/uL       Estimated Glomerular Filtration Rate:  On admission, estimated GFR based on a Creatinine of 0.62 mg/dl:   Using CKD-EPI = 108.6 mL/min/1.73m2   Using MDRD = 141.1 mL/min/1.73m2  Most recent estimated GFR, based on a Creatinine of 0.73 mg/dl on 9/4/2017:   Using CKD-EPI = 101.5 mL/min/1.73m2   Using MDRD = 116.9 mL/min/1.73m2       Assessment:     Primary Rehabilitation Diagnosis  1. Impaired Mobility and ADLs  2. S/P Left above-the-knee amputation (8/18/2017 - Dr. Estelle Brannon)  3.  History of ischemic rest pain of the left lower extremity due to severe peripheral vascular disease      Comorbidities   Benign hypertensive heart disease with systolic congestive heart failure, NYHA class 2  I11.0, I50.20    DDD (degenerative disc disease), lumbar M51.36    Erectile dysfunction N52.9    Spinal stenosis of lumbar region with radiculopathy M48.06, M54.16    Hereditary peripheral neuropathy G60.9    Aortoiliac occlusive disease  I74.09    Atherosclerosis of native artery of both lower extremities with intermittent claudication  I70.213    Peripheral artery disease  I73.9    Coronary artery disease involving native coronary artery of native heart I25.10    Paroxysmal atrial fibrillation  I48.0    Acute blood loss as cause of postoperative anemia D62    Anticoagulated on Coumadin Z51.81, Z79.01    Ischemic cardiomyopathy I25.5    Chronic systolic heart failure I50.22    Carotid artery disease  I77.9    Dyslipidemia E78.5    Euthyroid sick syndrome E07.81    Aftercare following surgery of the circulatory system Z48.812    Status post femoral-popliteal bypass surgery Z95.828    History of cardioversion Z98.890    Critical ischemia of right lower extremity I99.8    History of vitamin D deficiency Z86.39    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    Infection of vascular bypass graft  T82. 7XXA    Chronic anemia D64.9    Chronic ulcer of right heel (HCC) L97.419    Prolonged Q-T interval on ECG R94.31    Aftercare for amputation stump Z47.81    Moderate to severe pulmonary hypertension  I27.2    Adverse effect of amiodarone T46.2X5A    Skin ulcer of malleolar area of right ankle  L97.319         Plan:     1. Justification for continued stay: Good progression towards established rehabilitation goals. 2. Medical Issues being followed closely:    [x]  Fall and safety precautions     [x]  Wound Care     [x]  Bowel and Bladder Function     [x]  Fluid Electrolyte and Nutrition Balance     [x]  Pain Control      3. Issues that 24 hour rehabilitation nursing is following:    [x]  Fall and safety precautions     [x]  Wound Care     [x]  Bowel and Bladder Function     [x]  Fluid Electrolyte and Nutrition Balance     [x]  Pain Control      [x]  Assistance with and education on in-room safety with transfers to and from the bed, wheelchair, toilet and shower. 4. Acute rehabilitation plan of care:    [x]  Continue current care and rehab. [x]  Physical Therapy           [x]  Occupational Therapy           []  Speech Therapy     []  Hold Rehab until further notice     5. Medications:    [x]  MAR Reviewed     [x]  Continue Present Medications     6. DVT Prophylaxis:      []  Lovenox     []  Unfractionated Heparin     [x]  Coumadin     []  NOAC     []  VASQUEZ Stockings     []  Sequential Compression Device     []  None     7.  Orders:   > S/P Left above-the-knee amputation (8/18/2017 - Dr. Nando Weaver); History of ischemic rest pain of the left lower extremity due to severe peripheral artery disease   > Wound vac dressing changes by staff nurses every Monday, Wednesday, & Friday on day shift. Measure wound size & document with each dressing change. Settings: 125mmHG low continuous suction. Measure wound size & document with each dressing change. Change canister when 3/4 full. May use saline dressings daily until wound vac initiated.   > Mepilex Border dressings to left AKA staple line q2days & prn soilage. Wrap with ace wrap in figure 8 form.   > WoundVac was discontinued on 9/6/2017   > Staples to be removed on 9/15/2017    > S/P Removal of infected graft; Repair of left superficial femoral artery; Repair of left common femoral artery (7/25/2017 - Dr. Nando Weaver);  History of sepsis associated with infection of vascular bypass graft   > Continue Ampicillin Sulbactam 3 grams IVPB q 6 hr (STOP DATE: 9/19/2017)    > Acute Postoperative Blood Loss Anemia   > Hgb/Hct (8/24/2017, prior to admission to the ARU) = 8.4/25.6   > Anemia work-up (8/22/2017) showed serum iron 15, TIBC 146, iron % saturation 10   > Hgb/Hct (8/26/2017, on admission) = 8.6/26.5   > Reticulocyte count (8/26/2017) = 4.7   > Ferritin (8/26/2017) = 1066   > Hgb/Hct (8/28/2017) = 9.0/27.9    > Hgb/Hct (8/31/2017) = 8.4/26.4   > Hgb/Hct (9/3/2017) = 8.6/26.4    > Hgb/Hct (9/4/2017) = 8.7/27.5    > Continue:    > FeSO4 325 mg PO once daily with breakfast     > Ascorbic Acid 250 mg PO once daily with breakfast (to enhance the absorption of the FeSO4)     > Benign hypertensive heart disease with chronic systolic heart failure   > On 8/26/2017, increased Losartan from 50 mg to 100 mg PO once daily (6AM)   > On 9/6/2017, decreased Losartan from 100 mg to 50 mg PO once daily (6AM)   > Continue:    > Furosemide 20 mg PO once daily (6AM)    > Losartan 50 mg PO once daily (6AM)    > Metoprolol tartrate 25 mg PO q 12 hr (9AM, 9PM)    > Paroxysmal atrial fibrillation, presently normal sinus rhythm, anticoagulated on Coumadin   > Continue:    > Amiodarone 200 mg PO once daily    > Metoprolol tartrate 25 mg PO q 12 hr (9AM, 9PM)   > Target INR = 2 to 3   > INR 1.6   > Patient received no Coumadin last night (Coumadin had been held by Vascular Surgery since 9/5/2017)   > Coumadin 7.5 mg PO tonight    > Chronic ulcer of right heel    > Apply rigid heel suspension boots on both feet when supine in bed   > Podiatry follow-up (Dr. Emily Peraza) called by Vascular Surgery for comanagement   > Arterial duplex ultrasound (8/29/2017) showed patent common femoral to popliteal artery bypass graft without evidence of a hemodynamically significant stenosis. > For angiogram of the right lower extremity tomorrow   > Continue:    > Ascorbic acid 250 mg PO once daily with breakfast    > Zinc sulfate 220 mg PO once daily    > Skin ulcer of malleolar area of right ankle   > Cleanse wound with saline, apply Aquacel ag dressing, Mepilex Border to right lateral ankle wound every 48hrs & prn soilage. > Analgesia   > Continue:    > Acetaminophen 650 mg PO q 4 hr PRN for pain level less than 5/10    > Hydromorphone 2-4 mg PO q 4 hr PRN for pain level greater than 4/10      8. Patient's progress in rehabilitation and medical issues discussed with the patient. All questions answered to the best of my ability. Care plan discussed with patient and nurse.     9. Discharge Planning:   > For discharge to home tomorrow   [address] [place] [address] Physical Therapy, Occupational Therapy and Skilled Nursing (medication reconciliation, PT/INR monitoring - next PT/INR is on Monday (9/11/17), wound care of left AKA stump, right torso wounds, right ankle wound and right heel wound)   > PT/INR post-discharge care of Cardiology (Dr. Chantel Martin)   > Next PT/INR post-discharge is on Monday (9/11/2017) with results to be sent to the office of Dr. Chantel Martin   > F/U: 1. PCP (Dr. Ray Rojas)    2. Vascular Surgery (Dr. Jono Ruiz)    3.  Podiatry (Dr. Ada Orozco)      Signed:    Fanta Reyes MD    September 7, 2017

## 2017-09-08 VITALS
OXYGEN SATURATION: 100 % | HEART RATE: 65 BPM | WEIGHT: 156 LBS | TEMPERATURE: 98.8 F | BODY MASS INDEX: 22.33 KG/M2 | HEIGHT: 70 IN | RESPIRATION RATE: 18 BRPM | SYSTOLIC BLOOD PRESSURE: 136 MMHG | DIASTOLIC BLOOD PRESSURE: 77 MMHG

## 2017-09-08 LAB
INR PPP: 1.4 (ref 0.8–1.2)
PROTHROMBIN TIME: 16.7 SEC (ref 11.5–15.2)

## 2017-09-08 PROCEDURE — 85610 PROTHROMBIN TIME: CPT | Performed by: INTERNAL MEDICINE

## 2017-09-08 PROCEDURE — 77030011256 HC DRSG MEPILEX <16IN NO BORD MOLN -A

## 2017-09-08 PROCEDURE — 74011250637 HC RX REV CODE- 250/637: Performed by: INTERNAL MEDICINE

## 2017-09-08 PROCEDURE — 97116 GAIT TRAINING THERAPY: CPT

## 2017-09-08 PROCEDURE — 74011250636 HC RX REV CODE- 250/636: Performed by: SURGERY

## 2017-09-08 PROCEDURE — 74011250637 HC RX REV CODE- 250/637: Performed by: SURGERY

## 2017-09-08 PROCEDURE — 36415 COLL VENOUS BLD VENIPUNCTURE: CPT | Performed by: INTERNAL MEDICINE

## 2017-09-08 PROCEDURE — 74011000258 HC RX REV CODE- 258: Performed by: INTERNAL MEDICINE

## 2017-09-08 PROCEDURE — 74011250636 HC RX REV CODE- 250/636: Performed by: INTERNAL MEDICINE

## 2017-09-08 PROCEDURE — 77030018846 HC SOL IRR STRL H20 ICUM -A

## 2017-09-08 PROCEDURE — 74011000250 HC RX REV CODE- 250: Performed by: INTERNAL MEDICINE

## 2017-09-08 RX ORDER — FUROSEMIDE 20 MG/1
TABLET ORAL
Qty: 15 TAB | Refills: 0 | Status: SHIPPED | OUTPATIENT
Start: 2017-09-08 | End: 2017-09-19 | Stop reason: SDUPTHER

## 2017-09-08 RX ORDER — LANOLIN ALCOHOL/MO/W.PET/CERES
400 CREAM (GRAM) TOPICAL DAILY
Qty: 15 TAB | Refills: 0 | Status: SHIPPED | OUTPATIENT
Start: 2017-09-08 | End: 2017-09-19 | Stop reason: SDUPTHER

## 2017-09-08 RX ORDER — GABAPENTIN 300 MG/1
300 CAPSULE ORAL EVERY 8 HOURS
Qty: 45 CAP | Refills: 0 | Status: SHIPPED | OUTPATIENT
Start: 2017-09-08 | End: 2017-09-28 | Stop reason: SDUPTHER

## 2017-09-08 RX ADMIN — AMPICILLIN AND SULBACTAM 3 G: 1; 2 INJECTION, POWDER, FOR SOLUTION INTRAMUSCULAR; INTRAVENOUS at 04:25

## 2017-09-08 RX ADMIN — AMPICILLIN AND SULBACTAM 3 G: 1; 2 INJECTION, POWDER, FOR SOLUTION INTRAMUSCULAR; INTRAVENOUS at 08:57

## 2017-09-08 RX ADMIN — ASPIRIN 81 MG 81 MG: 81 TABLET ORAL at 08:52

## 2017-09-08 RX ADMIN — METOPROLOL TARTRATE 25 MG: 25 TABLET, FILM COATED ORAL at 08:51

## 2017-09-08 RX ADMIN — ALTEPLASE 1 MG: 2.2 INJECTION, POWDER, LYOPHILIZED, FOR SOLUTION INTRAVENOUS at 13:21

## 2017-09-08 RX ADMIN — Medication 400 MG: at 08:52

## 2017-09-08 RX ADMIN — CYANOCOBALAMIN TAB 1000 MCG 1000 MCG: 1000 TAB at 08:52

## 2017-09-08 RX ADMIN — LOSARTAN POTASSIUM 50 MG: 50 TABLET ORAL at 07:03

## 2017-09-08 RX ADMIN — HYDROMORPHONE HYDROCHLORIDE 0.5 MG: 1 INJECTION, SOLUTION INTRAMUSCULAR; INTRAVENOUS; SUBCUTANEOUS at 08:48

## 2017-09-08 RX ADMIN — GABAPENTIN 300 MG: 300 CAPSULE ORAL at 07:03

## 2017-09-08 RX ADMIN — AMIODARONE HYDROCHLORIDE 200 MG: 200 TABLET ORAL at 08:51

## 2017-09-08 RX ADMIN — OXYCODONE AND ACETAMINOPHEN 1 TABLET: 5; 325 TABLET ORAL at 13:44

## 2017-09-08 RX ADMIN — DOCUSATE SODIUM 100 MG: 100 CAPSULE, LIQUID FILLED ORAL at 08:52

## 2017-09-08 RX ADMIN — FUROSEMIDE 20 MG: 20 TABLET ORAL at 08:52

## 2017-09-08 RX ADMIN — POTASSIUM CHLORIDE 20 MEQ: 1.5 POWDER, FOR SOLUTION ORAL at 08:52

## 2017-09-08 RX ADMIN — Medication 1 CAPSULE: at 08:51

## 2017-09-08 RX ADMIN — GABAPENTIN 300 MG: 300 CAPSULE ORAL at 13:44

## 2017-09-08 RX ADMIN — VITAMIN D, TAB 1000IU (100/BT) 2000 UNITS: 25 TAB at 08:52

## 2017-09-08 RX ADMIN — ZINC SULFATE 220 MG (50 MG) CAPSULE 220 MG: CAPSULE at 08:52

## 2017-09-08 RX ADMIN — Medication 250 MG: at 08:52

## 2017-09-08 RX ADMIN — Medication 325 MG: at 08:52

## 2017-09-08 NOTE — ROUTINE PROCESS
SHIFT CHANGE NOTE FOR [facility]    Bedside and Verbal shift change report given to  David Soriano RN (oncoming nurse) by Parul Chávez RN   (offgoing nurse). Report included the following information SBAR, Kardex, MAR and Recent Results. Situation:   Code Status: Full Code   Reason for Admission: left AKA  Hospital Day: 14   Problem List:   Hospital Problems  Date Reviewed: 9/7/2017          Codes Class Noted POA    Skin ulcer of malleolar area of right ankle ([facility] (Chronic) ICD-10-CM: L97.319  ICD-9-CM: 707.13  Unknown Yes        * (Principal)Status post above knee amputation of left lower extremity ([facility] [address] ICD-10-CM: R52.951  ICD-9-CM: V49.76  8/18/2017 Yes    Overview Signed 8/21/2017 10:21 PM by Renetta Nagel MD     S/P Left above-the-knee amputation (8/18/2017 - Dr. Cait Garcia)               Aftercare for amputation stump ICD-10-CM: Z47.81  ICD-9-CM: V54.89  8/18/2017 Yes    Overview Signed 8/21/2017 10:25 PM by Renetta Nagel MD     S/P Left above-the-knee amputation (8/18/2017 - Dr. Cait Garcia)             Ischemic rest pain of lower extremity ([facility] ICD-10-CM: I73.9  ICD-9-CM: 443.9  8/14/2017 Yes    Overview Signed 8/15/2017 11:09 AM by Renetta Nagel MD     Left             Chronic ulcer of right heel ([facility] (Chronic) ICD-10-CM: L97.419  ICD-9-CM: 707.14  8/8/2017 Yes        Acute blood loss as cause of postoperative anemia ICD-10-CM: D62  ICD-9-CM: 285.1  7/25/2017 Yes        Aftercare following surgery of the circulatory system ICD-10-CM: Z48.812  ICD-9-CM: V58.73  7/25/2017 Yes    Overview Addendum 8/21/2017 10:22 PM by Renetta Nagel MD     S/P Removal of infected graft; Repair of left superficial femoral artery; Repair of left common femoral artery (7/25/2017 - Dr. Cait Garcia); S/P Right axillary femoral jump bypass interposition; Removal of infected right axillary femoral bypass;  Wound VAC placement of upper thigh 1.2 cm x 5.2 cm x 1.8 cm and groin 4 cm x 0.5 cm x 0.2 cm (8/18/2017 - Dr. Jennifer Kent)             Impaired mobility and ADLs ICD-10-CM: Z74.09  ICD-9-CM: 799.89  7/24/2017 Yes        Infection of vascular bypass graft [facility]) ICD-10-CM: T82. 7XXA  ICD-9-CM: 996.62  7/24/2017 Yes    Overview Signed 8/7/2017  2:19 PM by Griselda Kennedy MD     Left lower extremity             Anticoagulated on Coumadin (Chronic) ICD-10-CM: Z51.81, Z79.01  ICD-9-CM: V58.83, V58.61  Unknown Yes        Paroxysmal atrial fibrillation (HCC) ICD-10-CM: I48.0  ICD-9-CM: 427.31  Unknown Yes        Peripheral artery disease ([address] (Chronic) ICD-10-CM: I73.9  ICD-9-CM: 443.9  1/25/2017 Yes        Benign hypertensive heart disease with systolic congestive heart failure, NYHA class 2 (HCC) (Chronic) ICD-10-CM: I11.0, I50.20  ICD-9-CM: 402.11, 428.20, 428.0  1/26/2015 Yes              Background:   Past Medical History:   Past Medical History:   Diagnosis Date    Acute blood loss as cause of postoperative anemia 4/4/2017    Anticoagulated on Coumadin     Aortoiliac occlusive disease ([address] 1/25/2017    Atherosclerosis of native artery of both lower extremities with intermittent claudication ([address] 1/25/2017    Benign hypertensive heart disease with systolic congestive heart failure, NYHA class 2 ([address] 1/26/2015    Carotid artery disease (HCC)     Chronic anemia     Chronic systolic heart failure (HCC)     Chronic ulcer of right foot ([address] 4/4/2017    Chronic ulcer of right heel ([address] 8/8/2017    Coronary artery disease involving native coronary artery of native heart 3/15/2017    Successful stenting of Cx (Xience LESLEY) and RCA (Xience LESLEY) to 0% by Dr. Dustin Baer on 3/15/17.     DDD (degenerative disc disease), lumbar 1/26/2015    Dyslipidemia     Erectile dysfunction 7/5/2016    Euthyroid sick syndrome 4/6/2017    Hereditary peripheral neuropathy 11/15/2016    History of cardioversion 4/18/2017    S/P Synchronized external cardioversion (4/18/2017 - Dr. Shellie Villanueva)    History of vitamin D deficiency 4/22/2017    Vitamin D 25-Hydroxy (4/22/2017) = 12.0; Vitamin D 25-Hydroxy (5/26/2017) = 38.7    Infection of vascular bypass graft ([facility] 7/24/2017    Left lower extremity    Ischemic cardiomyopathy     Moderate to severe pulmonary hypertension ([facility] 7/23/2017    Paroxysmal atrial fibrillation (HCC)     Peripheral artery disease ([facility] 1/25/2017    Skin ulcer of malleolar area of right ankle ([facility]     Spinal stenosis of lumbar region with radiculopathy 5/4/2015    Dr. Brina Monroe      Patient taking anticoagulants no    Patient has a defibrillator: no     Assessment:   Changes in Assessment throughout shift: no     Patient has central line: yes Reasons if yes: long term antibiotic  Insertion date:08/24/17 Last dressing date:08/05/17   Patient has Jiang Cath: no Reasons if yes:     Insertion date:     Last Vitals:     Vitals:    09/07/17 1520 09/07/17 1549 09/07/17 1651 09/07/17 2000   BP: 137/68 149/68 137/65 146/75   Pulse: (!) 59 (!) 59 66 79   Resp: 14 16 20 16   Temp:   97.9 °F (36.6 °C) 98.3 °F (36.8 °C)   SpO2: 99% 97% 100% 98%   Weight:       Height:            PAIN    Pain Assessment    Pain Intensity 1: 0 (09/08/17 0400) Pain Intensity 1: 2 (12/29/14 1105)    Pain Location 1: Leg Pain Location 1: Abdomen    Pain Intervention(s) 1: Medication (see MAR) Pain Intervention(s) 1: Medication (see MAR)  Patient Stated Pain Goal: 0 Patient Stated Pain Goal: 0  o Intervention effective: no   o Other actions taken for pain: pain medicine     Skin Assessment  Skin color Skin Color: Appropriate for ethnicity  Condition/Temperature Skin Condition/Temp: Warm  Integrity Skin Integrity: Incision (comment)  Turgor Turgor: Non-tenting  Weekly Pressure Ulcer Documentation  Pressure  Injury Documentation: No Pressure Injury Noted-Pressure Ulcer Prevention Initiated  Wound Prevention & Protection Methods  Orientation of wound Orientation of Wound Prevention: Posterior  Location of Prevention Location of Wound Prevention: Sacrum/Coccyx  Dressing Present Dressing Present : Yes  Dressing Status Dressing Status: Intact  Wound Offloading Wound Offloading (Prevention Methods): Bed, pressure redistribution/air     INTAKE/OUPUT    Date 09/07/17 0700 - 09/08/17 0659 09/08/17 0700 - 09/09/17 0659   Shift 0700-1859 1900-0659 24 Hour Total 0700-1859 1900-0659 24 Hour Total   I  N  T  A  K  E   I.V.  (mL/kg/hr) 150  (0.2)  150         I.V. 150  150       Shift Total  (mL/kg) 150  (2.1)  150  (2.1)      O  U  T  P  U  T   Urine  (mL/kg/hr)  900 900         Urine Voided  900 900         Urine Occurrence(s) 6 x 2 x 8 x       Stool            Stool Occurrence(s) 1 x 0 x 1 x       Shift Total  (mL/kg)  900  (12.7) 900  (12.7)       -900 -750      Weight (kg) 70.8 70.8 70.8 70.8 70.8 70.8       Recommendations:  1. Patient needs and requests: pain medicine    2. Diet: cardiac    3. Pending tests/procedures: no     4. Functional Level/Equipment: 1 x person assist    5. Estimated Discharge Date: TDB Posted on Whiteboard in Patients Room: [facility] Safety Check    A safety check occurred in the patient's room between off going nurse and oncoming nurse listed above. The safety check included the below items  Area Items   H  High Alert Medications - Verify all high alert medication drips (heparin, PCA, etc.)   E  Equipment - Suction is set up for ALL patients (with yanker)  - Red plugs utilized for all equipment (IV pumps, etc.)  - WOWs wiped down at end of shift.  - Room stocked with oxygen, suction, and other unit-specific supplies   A  Alarms - Bed alarm is set for fall risk patients  - Ensure chair alarm is in place and activated if patient is up in a chair   L  Lines - Check IV for any infiltration  - Jiang bag is empty if patient has a Jiang   - Tubing and IV bags are labeled   S  Safety   - Room is clean, patient is clean, and equipment is clean. - Hallways are clear from equipment besides carts. - Fall bracelet on for fall risk patients  - Ensure room is clear and free of clutter  - Suction is set up for ALL patients (with cailin)  - Hallways are clear from equipment besides carts.    - Isolation precautions followed, supplies available outside room, sign posted

## 2017-09-08 NOTE — OP NOTES
[address]    Name:  Romayne Ping  MR#:  [id number]  :  1960  Account #:  [de-identified]  Date of Adm:  2017  Date of Surgery:  2017      ATTENDING: Muriel Gold MD    PREOPERATIVE DIAGNOSES  1. Voltaire class 6 arterial disease. 2. Gangrene. 3. Peripheral arterial disease. POSTOPERATIVE DIAGNOSES  1. Voltaire class 6 arterial disease. 2. Gangrene. 3. Peripheral arterial disease. PROCEDURES PERFORMED  1. Moderate conscious sedation of 59 minutes. 2. Ultrasound-guided access of right common femoral artery. 3. Right lower extremity angiography. 4. Atherectomy and balloon angioplasty of the below-the-knee popliteal  artery, above-the-knee popliteal artery, tibioperoneal trunk artery and  posterior tibial artery. 5. Arterial closure using Angio-Seal.    CULTURES: None. SPECIMENS REMOVED: None. DRAINS: None. ESTIMATED BLOOD LOSS: Less than 50 mL. ANESTHESIA: Moderate conscious sedation of 59 minutes. This was  provided by an independent provider, given her medications per my  discussion and monitoring the vital signs throughout the case. INDICATIONS FOR PROCEDURE: The patient is a 61-year-old  gentleman with right lower extremity critical limb ischemia, the patient  does have a heel ulcer as well as gangrene of the great toe. The  patient was recommended for the procedure and given the risks and  benefits of the procedure including but not limited to bleeding,  infection, damage to adjacent structures, MI, stroke, and death, as well  as loss of lower extremity and loss of bypass. The patient does  understand all the risks and underwent the procedure. OPERATIVE FINDINGS  1. Right common femoral artery is patent, without stenosis. 2. Right femoral to above-knee popliteal artery bypass is patent,  without stenosis. The superficial femoral artery is occluded.   3. The above-knee popliteal artery is patent, without stenosis, but is  abruptly occluded behind the knee. 4. The below-knee popliteal artery is reconstituted distally, small  and diminutive size. 5. The anterior tibial artery is patent. Does show a 20-30% narrowing  at its proximal portion, but is patent, without stenosis distally. 6. The tibioperoneal trunk artery is patent and shows a 70% narrowing  within its proximal portion. 7. The peroneal artery is patent, without stenosis and goes all the way  to the ankle. 8. Posterior tibial artery is patent proximally, but does have a short  segment occlusion of about 5 cm and then is reconstituted and is  patent, without stenosis all the way to the foot. DESCRIPTION OF PROCEDURE: The patient was correctly identified  in the precath area and taken to the cath lab in stable condition. The  patient had a pre-incision time-out prior to incision. The patient was  prepped and draped in normal sterile fashion according to [facility]  guidelines aseptic technique. We then were able to use an ultrasound,  visualized the common femoral artery, a picture was taken and kept  with the patient's chart. We numbed him up appropriately using 1% lidocaine, took a single  wall micropuncture needle, gained access into the artery and were  able to get a mandrel wire going down the femoral popliteal bypass. We then were able to place a 4-Macedonian micropuncture sheath in place,  Bentson wire in and then a 6-Macedonian short sheath then placed. Selective right lower extremity angiogram was performed. Given the  above findings, it was decided to move forward with the procedure. Glidewire advantage wire was then placed in and then was able to be  slowly guided into the posterior tibial artery us a Berenstein catheter,  this was confirmed with angiography. We then placed a Viper wire in  place and then performed atherectomy with a 1.25 mm Stealth crown  device. We then were able to balloon the posterior tibial artery and TP  trunk with a 3 mm balloon.  We then ballooned the popliteal artery with  a 5 mm balloon. Repeat angiography showed resolution in narrowing  of the posterior tibial artery, there was still continued 70% narrowing of  the tibioperoneal trunk artery and there is still a residual 40%  narrowing of the popliteal artery. We then ballooned the TP trunk with  a 4 mm balloon and then we reballooned the popliteal artery with a 6  mm balloon. Repeat angiography shows complete resolution of all  those narrowings and rapid blood flow going through all 3 vessels  down to the ankle and towards the foot. Decision was then made to  conclude the procedure and a 6-Norwegian Angio-Seal was deployed in  the standard fashion with 2 minutes of manual compression with good  hemostasis. Dermabond was used for dressing.         MD ALFONSO Gonsalez / CHEMO  D:  09/07/2017   17:50  T:  09/07/2017   23:50  Job #:  [id number]

## 2017-09-08 NOTE — DISCHARGE SUMMARY
[facility] PHYSICAL REHABILITATION  [address] [place] [address], [address]     INPATIENT REHABILITATION  DISCHARGE SUMMARY    Name: Jase Garcia MRN: [id number]   Age / Sex: 59 y.o. / male CSN: [id number]   YOB: 1960 Length of Stay: 14 days   Admit Date: 8/25/2017 Discharge Date: 9/8/2017       PRIMARY CARE PHYSICIAN: Silvio Parker MD      DISCHARGE DIAGNOSES:    Primary Rehabilitation Diagnosis  1. Impaired Mobility and ADLs  2. S/P Left above-the-knee amputation (8/18/2017 - Dr. Estelle Brannon)  3. History of ischemic rest pain of the left lower extremity due to severe peripheral vascular disease      Comorbidities   Benign hypertensive heart disease with systolic congestive heart failure, NYHA class 2  I11.0, I50.20    DDD (degenerative disc disease), lumbar M51.36    Erectile dysfunction N52.9    Spinal stenosis of lumbar region with radiculopathy M48.06, M54.16    Hereditary peripheral neuropathy G60.9    Aortoiliac occlusive disease  I74.09    Atherosclerosis of native artery of both lower extremities with intermittent claudication  I70.213    Peripheral artery disease  I73.9    Coronary artery disease involving native coronary artery of native heart I25.10    Paroxysmal atrial fibrillation  I48.0    Acute blood loss as cause of postoperative anemia D62    Anticoagulated on Coumadin Z51.81, Z79.01    Ischemic cardiomyopathy I25.5    Chronic systolic heart failure  V32.14    Carotid artery disease  I77.9    Dyslipidemia E78.5    Euthyroid sick syndrome E07.81    Aftercare following surgery of the circulatory system Z48.812    Status post femoral-popliteal bypass surgery Z95.828    History of cardioversion Z98.890    Critical ischemia of right lower extremity I99.8    History of vitamin D deficiency Z86.39    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    Infection of vascular bypass graft  T82. 7XXA    Chronic anemia D64.9    Chronic ulcer of right heel ([facility] L97.419    Prolonged Q-T interval on ECG R94.31    Aftercare for amputation stump Z47.81    Moderate to severe pulmonary hypertension  I27.2    Adverse effect of amiodarone T46.2X5A    Skin ulcer of malleolar area of right ankle  L97.319         CONSULTS CALLED:   1. Wound Care Nurse  2. Vascular Surgery (Dr. Polly Irwin)      PROCEDURES DONE: S/P Atherectomy and balloon angioplasty of the below-the-knee popliteal artery, above-the-knee popliteal artery, tibioperoneal trunk artery and posterior tibial artery (9/7/2017 - Dr. Polly Irwin)      BRIEF HISTORY: The patient is a 70-year-old White male with multiple medical comorbidities who was admitted to [facility] on 7/24/2017 due to severe sepsis. The patient was apparently well until ~2 days prior to admission, the patient woke upand was was unable to breath so he called 911 and he was brought to the [facility] Emergency Department. CT angiogram of the chest was negative for pulmonary embolism. 2D echocardiogram showed EF 50%, severe right heart dilatation with reduced right ventricular global systolic function. He was placed on pressors and diuresed with good improvement. His WBCs were noted to be elevated at 27.7K and he was started on intravenous antibiotics. Blood cultures were reportedly positive. He was noted to have an exposed graft in the left groin with purulent drainage. He denied any pain. He had improved clinically and had been weaned off pressors. He was transferred to [facility] for further treatment by his vascular surgeon. The patient was admitted under the service of Vascular Surgery (Dr. Polly Irwin). WBC count was 13.9. Patient was continued on Piperacillin-Tazobactam and Vancomycin. Critical Care Medicine consult (Dr. Josue Beach) was called for evaluation and comanagement.  Cardiology consult (Dr. Marga Milton) was called for evaluation and comanagement. The patient was brought to the operating room and underwent Removal of infected graft; Repair of left superficial femoral artery; Repair of left common femoral artery on 7/25/2017 done by Dr. Nando Weaver. The patient tolerated the procedure well with no intraoperative complications. Blood culture drawn on 7/24/2017 yielded growth of MSSA. Surgical wound culture done 7/25 yielded growth of MSSA. Infectious Disease consult (Dr. Trisha Giordano) was called for evaluation and comanagement. Piperacillin-Tazobactam and Vancomycin were discontinued and the patient was started on Nafcillin IV continuous infusion on 7/28/2017. Blood culture drawn on 7/28/2017 yielded no growth after 6 days. On 8/2/2017, the Nafcillin IV continuous infusion was changed to Cefazolin 2 grams IV q 8 hr. Blood culture drawn on 8/2/2017 yielded no growth after 6 days. Infectious Disease recommended the Cefazolin to be continued until 9/12/2017. The patient had remained hemodynamically stable but due to the above events, the patient was noted to be generally weak and with impaired mobility and ADLs. Patient was felt to be a good candidate for acute inpatient rehabilitation. Upon evaluation by Physical Therapy and Occupational Therapy, the patient was recommended for acute inpatient rehabilitation. WBC count on 8/7/2017 prior to discharge was 12.7. The patient was discharged and was subsequently admitted to the [facility] for Physical Rehabilitation on 8/7/2017 for intensive rehabilitation to help recover strength, function and mobility.      The patient was able to actively participate in the therapy activities but he has reported numbness from the middle of the left leg downwards.  On 8/10/2017, Vascular Surgery ([address] [place] [address] N, [address]) was informed of the numbness and erythema of the shin of the left leg and she was aware of it; no diagnostic studies for now; Vascular Surgery will be evaluating the patient in the AM to see if he needs further surgical intervention. On 8/12/2017, patient complained of worsening pain of LLE at rest. WBC count (8/13/2017) = 12.4. On 8/14/2017, patient was seen and evaluated by JUNE Monk and discussed with Dr. Lisa Orozco and it was felt that the patient has ischemic rest pain of the left lower extremity and the patient will be scheduled for removal of infected right bypass graft with jump bypass and left AKA on 8/18/2017. The patient was discharged / transferred to [facility] as an inpatient under the service of Vascular Surgery (Dr. Lisa Orozco) on 8/17/2017.   Richie Eckert  The patient underwent a Left above-the-knee amputation; Right axillary femoral jump bypass interposition; Removal of infected right axillary femoral bypass; Wound VAC placement of upper thigh 1.2 cm x 5.2 cm x 1.8 cm and groin 4 cm x 0.5 cm x 0.2 cm on 8/18/2017 done by Dr. Lisa Orozco. The patient tolerated the procedure well with no reported intraoperative complications. The patient developed acute postoperative blood loss anemia and he was transfused with pRBC. Intravenous Cefazolin was continued. On 8/19/2017, the patient was noted to have an altered mental status. The Rapid response Team was called. The patient was given Lorazepam and Phenobarbital. Three-point restraints had been utilized. The [facility] Hospitalist group (Dr. Abner Mcnally) was called for medical comanagement. Cefazolin was changed to Piperacillin-Tazobactam IV and Vancomycin IV on 8/19/2017. Impression was Acute metabolic encephalopathy. Patient was noted to have a prolonged QTc interval. Diltiazem and Amiodarone were put on hold. Metoprolol tartrate was continued. Cardiology consult (Dr. Anand Massey) was called for evaluation and comanagement. Amiodarone was resumed. The confusion/alteration in mental status was noted to slowly improve.  Infectious Disease consult (Dr. Shayan Fraga) was called for evaluation and comanagement. On 8/22/2017, Piperacillin-Tazobactam IV and Vancomycin IV were discontinued and the patient was placed on Ampicillin-Sulbactam IV to be given until 9/19/2017. A PICC line was inserted by Interventional Radiology (Dr. Otoniel Arriaga) on 8/24/2017. The patient had remained hemodynamically stable but due to the above events, the patient was noted to be generally weak and with impaired mobility and ADLs. Patient was felt to be a good candidate for acute inpatient rehabilitation. Upon evaluation by Physical Therapy and Occupational Therapy, the patient was recommended for acute inpatient rehabilitation. The patient was discharged and was subsequently admitted to the [facility] for Physical Rehabilitation on 8/7/2017 for intensive rehabilitation to help recover strength, function and mobility. COURSE IN THE HOSPITAL: Upon admission to the [facility] for Physical Rehabilitation, the patient underwent physical therapy, occupational therapy and speech therapy. The patient was able to actively participate in the rehabilitation activities and progressed well. On discharge, the patient was able to perform the following activities:    1. Occupational Therapy    ON ADMISSION ON DISCHARGE   Eating  Functional Level: 6   Eating  Functional Level: 6     Grooming  Functional Level: 7   Grooming  Functional Level: 7     Bathing  Functional Level: 4   Bathing  Functional Level: 6     Upper Body Dressing  Functional Level: 7   Upper Body Dressing  Functional Level: 7     Lower Body Dressing  Functional Level: 4   Lower Body Dressing  Functional Level: 5     Toileting  Functional Level: 3   Toileting  Functional Level: 6     Toilet Transfers  Toilet Transfer Score: 4   Toilet Transfers  Toilet Transfer Score: 5     Tub /Shower Transfers  Tub/Shower Transfer Score: 4   Tub/Shower Transfers  Tub/Shower Transfer Score: 5       2.  Physical Therapy    ON ADMISSION ON DISCHARGE   Wheelchair Mobility/Management  Able to Propel (ft): 250 feet  Functional Level: 6  Curbs/Ramps Assist Required (FIM Score): 6 (Modified independent)  Wheelchair Setup Assist Required : 6 (Modified independent)  Wheelchair Management: Manages right brake, Manages left brake Wheelchair Mobility/Management  Able to Propel (ft): 300 feet  Functional Level: 6  Curbs/Ramps Assist Required (FIM Score): 6 (Modified independent)  Wheelchair Setup Assist Required : 6 (Modified independent)  Wheelchair Management: Manages left brake, Manages right brake, Manages right footrest     Gait  Amount of Assistance: 4 (Contact guard assistance)  Distance (ft): 40 Feet (ft) (x2 reps)  Assistive Device: Walker, rolling Gait  Amount of Assistance: 5 (Supervision/setup)  Distance (ft): 80 Feet (ft) (x 2 trials)  Assistive Device: Walker, rolling     Balance-Sitting/Standing  Sitting - Static: Normal   Sitting - Dynamic: Normal  Standing - Static: Fair, Constant support (with RW)  Standing - Dynamic : Impaired Balance-Sitting/Standing  Sitting - Static: Good (unsupported)  Sitting - Dynamic: Good (unsupported)  Standing - Static: Fair  Standing - Dynamic : Impaired     Bed/Mat Mobility  Rolling Right : 7 (Independent)  Rolling Left : 7 (Independent)  Supine to Sit : 7 (Independent)  Sit to Supine : 7 (Independent) Bed/Mat Mobility  Rolling Right : 7 (Independent)  Rolling Left : 7 (Independent)  Supine to Sit : 7 (Independent)  Sit to Supine : 7 (Independent)     Transfers  Transfer Type: Other  Other: Stand-step with RW with CGA/SBA  Transfer Assistance : 4 (Contact guard assistance)  Sit to Stand Assistance: Contact guard assistance  Car Transfers: Not tested  Car Type: n/a Transfers  Transfer Type: Other  Other: Patient performs squat pivot transfer w/o AD and stand hop transfer RW. Patient completes squat pivot transfer w/o AD requiring no physical assist or verbal cueing from therapist to complete safely; performed x 4 in session.  Patient also perfomrs stand hop transfer with RW to challenge transfers in home/non ideal environement; jagdish completes transfer x 12 in session with Mod I demosntrating good hand position, pace, and technique  Transfer Assistance : 6 (Modified independent)  Sit to Stand Assistance: Modified independent  Car Transfers: Independent  Car Type: Car simulator     Steps or Stairs  Steps/Stairs Ambulated (#):  (3 4 inch)  Level of Assist : 0 (Not tested)  Rail Use: Both Steps or Stairs  Steps/Stairs Ambulated (#): 12  Level of Assist : 4 (Minimal assistance)  Rail Use: Both (41\" wide B HR)       3. Speech and Language Pathology    ON ADMISSION ON DISCHARGE   Comprehension (Native Language)  Primary Mode of Comprehension: Auditory  Score: 6 Comprehension (Native Language)  Primary Mode of Comprehension: Auditory  Score: 6     Expression (Native Language)  Primary Mode of Expression: Verbal  Score: 6   Expression (Native Language)  Primary Mode of Expression: Verbal  Score: 6     Social Interaction/Pragmatics  Score: 5 Social Interaction/Pragmatics  Score: 5     Problem Solving  Score: 4   Problem Solving  Score: 5     Memory  Score: 4 Memory  Score: 5       Legend:   7 - Independent   6 - Modified Independent   5 - Standby Assistance / Supervision / Set-up   4 - Minimum Assistance / Contact Guard Assistance   3 - Moderate Assistance   2 - Maximum Assistance   1 - Total Assistance / Dependent       ACUTE MEDICAL ISSUES ADDRESSED IN INPATIENT REHABILITATION FACILITY:     > S/P Left above-the-knee amputation (8/18/2017 - Dr. Linda Hannah); History of ischemic rest pain of the left lower extremity due to severe peripheral artery disease   > Wound vac dressing changes by staff nurses every Monday, Wednesday, & Friday on day shift. Measure wound size & document with each dressing change. Settings: 125mmHG low continuous suction. Measure wound size & document with each dressing change. Change canister when 3/4 full.  May use saline dressings daily until wound vac initiated.   > Mepilex Border dressings to left AKA staple line q2days & prn soilage. Wrap with ace wrap in figure 8 form.   > WoundVac was discontinued on 9/6/2017   > Staples to be removed on 9/15/2017    > S/P Removal of infected graft; Repair of left superficial femoral artery; Repair of left common femoral artery (7/25/2017 - Dr. Laura Caraballo);  History of sepsis associated with infection of vascular bypass graft   > Continue Ampicillin Sulbactam 3 grams IVPB q 6 hr (STOP DATE: 9/19/2017)    > Acute Postoperative Blood Loss Anemia   > Hgb/Hct (8/24/2017, prior to admission to the ARU) = 8.4/25.6   > Anemia work-up (8/22/2017) showed serum iron 15, TIBC 146, iron % saturation 10   > Hgb/Hct (8/26/2017, on admission) = 8.6/26.5   > Reticulocyte count (8/26/2017) = 4.7   > Ferritin (8/26/2017) = 1066   > Hgb/Hct (8/28/2017) = 9.0/27.9    > Hgb/Hct (8/31/2017) = 8.4/26.4   > Hgb/Hct (9/3/2017) = 8.6/26.4    > Hgb/Hct (9/4/2017) = 8.7/27.5    > Continue:    > FeSO4 325 mg PO once daily with breakfast     > Ascorbic Acid 250 mg PO once daily with breakfast (to enhance the absorption of the FeSO4)     > Benign hypertensive heart disease with chronic systolic heart failure   > On 8/26/2017, increased Losartan from 50 mg to 100 mg PO once daily (6AM)   > On 9/6/2017, decreased Losartan from 100 mg to 50 mg PO once daily (6AM)   > Continue:    > Furosemide 20 mg PO once daily (6AM)    > Losartan 50 mg PO once daily (6AM)    > Metoprolol tartrate 25 mg PO q 12 hr (9AM, 9PM)    > Paroxysmal atrial fibrillation, presently normal sinus rhythm, anticoagulated on Coumadin   > Continue:    > Amiodarone 200 mg PO once daily    > Metoprolol tartrate 25 mg PO q 12 hr (9AM, 9PM)   > Coumadin had been held by Vascular Surgery from 9/5/2017 to 9/6/2017   > Target INR = 2 to 3   > INR 1.6   > Patient received Coumadin 7.5 mg on the night prior to discharge   > Will discharge patient on Coumadin 5 mg PO q HS    > Chronic ulcer of right heel    > Apply rigid heel suspension boots on both feet when supine in bed   > Podiatry follow-up (Dr. Seth Krabbe) called by Vascular Surgery for comanagement   > Arterial duplex ultrasound (8/29/2017) showed patent common femoral to popliteal artery bypass graft without evidence of a hemodynamically significant stenosis.   > S/P Atherectomy and balloon angioplasty of the below-the-knee popliteal artery, above-the-knee popliteal artery, tibioperoneal trunk artery and posterior tibial artery (9/7/2017 - Dr. Willow Wong)   > Continue:    > Ascorbic acid 250 mg PO once daily with breakfast    > Zinc sulfate 220 mg PO once daily    > Skin ulcer of malleolar area of right ankle   > Cleanse wound with saline, apply Aquacel ag dressing, Mepilex Border to right lateral ankle wound every 48hrs & prn soilage. > Analgesia   > Continue:    > Acetaminophen 650 mg PO q 4 hr PRN for pain level less than 5/10    > Percocet 5/325 1 tab PO q 4 hr PRN for pain level greater than 4/10      MEDICATIONS ON DISCHARGE:    Current Discharge Medication List      START taking these medications    Details   ampicillin-sulbactam 3 gram 3 g IVPB 3 g by IntraVENous route every six (6) hours for 11 days. [STOP DATE: 9/19/2017]  Qty: 44 Dose, Refills: 0    Associated Diagnoses: Infection of vascular bypass graft, subsequent encounter      potassium chloride (KLOR-CON) 20 mEq packet Take 1 Packet by mouth daily. [while on Furosemide]  Indications: hypokalemia prevention  Qty: 15 Packet, Refills: 0         CONTINUE these medications which have CHANGED    Details   furosemide (LASIX) 20 mg tablet 1 tablet daily  Indications: Pulmonary Edema due to Chronic Heart Failure  Qty: 15 Tab, Refills: 0    Associated Diagnoses: Benign hypertensive heart disease with systolic congestive heart failure, NYHA class 2 ([facility]  Chronic systolic heart failure (HCC)      gabapentin (NEURONTIN) 300 mg capsule Take 1 Cap by mouth every eight (8) hours. Indications: NEUROPATHIC PAIN  Qty: 45 Cap, Refills: 0    Associated Diagnoses: Hereditary peripheral neuropathy      magnesium oxide (MAG-OX) 400 mg tablet Take 1 Tab by mouth daily. Qty: 15 Tab, Refills: 0      warfarin (COUMADIN) 5 mg tablet Take 1 Tab by mouth nightly. [Dose to be adjusted by Cardiology with a target INR of 2 to 3]  Qty: 15 Tab, Refills: 0    Associated Diagnoses: Paroxysmal atrial fibrillation ([facility] Anticoagulated on Coumadin      losartan (COZAAR) 25 mg tablet Take 2 Tabs by mouth daily. Indications: Chronic Heart Failure, hypertension  Qty: 15 Tab, Refills: 0    Associated Diagnoses: Benign hypertensive heart disease with systolic congestive heart failure, NYHA class 2 (HCC)      oxyCODONE-acetaminophen (PERCOCET) 5-325 mg per tablet Take 1 Tab by mouth every four (4) hours as needed (for moderate to severe pain). Max Daily Amount: 6 Tabs. Indications: Pain  Qty: 50 Tab, Refills: 0    Associated Diagnoses: Status post above knee amputation of left lower extremity ([facility]         CONTINUE these medications which have NOT CHANGED    Details   metoprolol tartrate (LOPRESSOR) 25 mg tablet Take 1 Tab by mouth every twelve (12) hours. Indications: hypertension, VENTRICULAR RATE CONTROL IN ATRIAL FIBRILLATION  Qty: 30 Tab, Refills: 6      amiodarone (CORDARONE) 200 mg tablet Take 1 Tab by mouth daily. Indications: VENTRICULAR RATE CONTROL IN ATRIAL FIBRILLATION  Qty: 60 Tab, Refills: 0      aspirin 81 mg chewable tablet Take 1 Tab by mouth daily (with breakfast). Qty: 90 Tab, Refills: 3      cholecalciferol (VITAMIN D3) 1,000 unit tablet Take 1 Tab by mouth daily. Qty: 90 Tab, Refills: 3      ascorbic acid, vitamin C, (VITAMIN C) 250 mg tablet Take 1 Tab by mouth daily (with breakfast). Qty: 90 Tab, Refills: 2      zinc sulfate (ZINCATE) 220 (50) mg capsule Take 1 Cap by mouth daily.   Qty: 90 Cap, Refills: 0      ferrous sulfate 325 mg (65 mg iron) tablet Take 1 Tab by mouth daily (with breakfast). Qty: 90 Tab, Refills: 3      atorvastatin (LIPITOR) 40 mg tablet Take 1 Tab by mouth nightly. Indications: DYSLIPIDEMIA  Qty: 90 Tab, Refills: 3      cyanocobalamin (VITAMIN B-12) 1,000 mcg tablet Take 1 Tab by mouth daily. Qty: 90 Tab, Refills: 3         STOP taking these medications       dilTIAZem (CARDIZEM) 30 mg tablet Comments:   Reason for Stopping:         DULoxetine (CYMBALTA) 60 mg capsule Comments:   Reason for Stopping:           Estimated Glomerular Filtration Rate:  On admission, estimated GFR based on a Creatinine of 0.62 mg/dl:   Using CKD-EPI = 108.6 mL/min/1.73m2   Using MDRD = 141.1 mL/min/1.73m2  Most recent estimated GFR, based on a Creatinine of 0.73 mg/dl on 9/4/2017:   Using CKD-EPI = 101.5 mL/min/1.73m2   Using MDRD = 116.9 mL/min/1.73m2       DISCHARGE VITAL SIGNS:  Visit Vitals    /77    Pulse 65    Temp 98.8 °F (37.1 °C)    Resp 18    Ht 5' 10\" (1.778 m)    Wt 70.8 kg (156 lb)    SpO2 100%    BMI 22.38 kg/m2       DISCHARGE PHYSICAL EXAMINATION:  GENERAL SURVEY: Patient is awake, alert, oriented x 3, sitting comfortably on the chair, not in acute respiratory distress.   HEENT: pale palpebral conjunctivae, anicteric sclerae, no nasoaural discharge, moist oral mucosa  NECK: supple, no jugular venous distention, no palpable lymph nodes  CHEST/LUNGS: symmetrical chest expansion, good air entry, clear breath sounds  HEART: adynamic precordium, good S1 S2, no S3, regular rhythm, no murmurs  ABDOMEN: flat, bowel sounds appreciated, soft, non-tender  EXTREMITIES: (+) left AKA stump with dressing, (+) ~1 cm x ~1 cm ulcer on right heel, (+) 1.5 cm x 1.5 cm wound above lateral malleolus of right ankle, pale nailbeds, no edema, full and equal pulses, no calf tenderness   NEUROLOGICAL EXAM: The patient is awake, alert and oriented x3, able to answer questions fairly appropriately, able to follow 1 and 2 step commands.  Able to tell time from the wall clock.  Cranial nerves II-XII are grossly intact.  No gross sensory deficit.  Motor strength is 4+/5 on BUE, 4+/5 on the RLE, 3+/5 on the left hip, 4-/5 on the left knee and left ankle.      Incision(s): healing well, clean, dry, and intact       CONDITION ON DISCHARGE: Stable. DISPOSITION: Patient clinically improved and was discharged to home with home health physical therapy, occupational therapy and skilled nursing. The patient is temporarily homebound secondary to functional deficits after undergoing a Left above-the-knee amputation (8/18/2017 - Dr. Hilda Carroll). He can ambulate using a rolling walker (see above). The patient would benefit from continued skilled physical therapy in order to improve independent functional mobility within the home with use of least restrictive device. The patient would also benefit from continued skilled occupational therapy in order to improve self care and functional mobility within the home with use of least restrictive device. The patient would also benefit from continued skilled speech therapy in order to work on cognition, swallowing and speech restoration. Short-term skilled nursing is needed for medication reconciliation, PT/INR monitoring and wound care (post-op wound of left AKA stump, right torso wounds, right ankle wound and right heel wound). PT/INR post-discharge care of Cardiology (Dr. Rosa Gonzales). Next PT/INR post-discharge is on Monday (9/11/2017) with results to be sent to the office of Dr. Rosa Gonzales. FOLLOW-UP RECOMMENDATIONS:   Follow-up Information     Follow up With Details Comments [address]    Maxwell Vargas MD On 9/19/2017 Patient has an appointment scheduled with PCP Dr. August Alejo Comment group on September 19, 2017 @ 11:45am.  Patient will be seen by covering Dr. Destini Vasquez Fabio Urbina MD On 9/21/2017 Patient has an appointment scheduled with Vascular Surgeon Dr. Landy Romo on September 21, 2017 @ 9:00am. [address] [facility]  [employer] [place] [address]  Viji Pearce DPM On 9/13/2017 Patient has an appointment scheduled with Podiatry Dr. Vita Santana on September 13, 2017 @ 12:15pm. [address] [phone]            OTHER INSTRUCTIONS:  1. Diet. Low saturated fat diet. 2. Activity. As tolerated. 3. Safety / fall precautions. 4. Weightbearing status. Weightbearing as tolerated on the right lower extremity. TIME SPENT ON DISCHARGE ACTIVITIES: More than 30 minutes.       Signed:  Corazon Pabon MD    9/8/2017

## 2017-09-08 NOTE — PROGRESS NOTES
Problem: Mobility Impaired (Adult and Pediatric)  Goal: *Acute Goals and Plan of Care (Insert Text)  Physical Therapy Short Term Goals  Initiated 2017 and to be accomplished within 7 day(s) 2017  1. Patient will move from supine to sit and sit to supine , scoot up and down and roll side to side in bed with independence. 2. Patient will transfer from bed to chair and chair to bed with supervision/set-up using the least restrictive device. 3. Patient will perform sit to stand with supervision/set-up. 4. Patient will ambulate with supervision/set-up for 50 feet with the least restrictive device. 5. Patient will ascend/descend 3 stairs with 2 handrail(s) with minimal assistance/contact guard assist.    Physical Therapy Long Term Goals  Initiated 2017 and to be accomplished within 14 day(s) 2017  1. Patient will move from supine to sit and sit to supine , scoot up and down and roll side to side in bed with independence. 2. Patient will transfer from bed to chair and chair to bed with modified independence using the least restrictive device. 3. Patient will perform sit to stand with modified independence. 4. Patient will ambulate with modified independence for 50 feet with the least restrictive device. 5. Patient will ascend/descend 3 stairs with 2 handrail(s) with supervision/set-up. PHYSICAL THERAPY DAILY NOTE  Patient Name:Mj Castro  Time In: 1100  Time Out: 0346  Patient Seen For: AM;Balance activities;Gait training  Diagnosis: Left AKA  Status post above knee amputation of left lower extremity ([address] Status post above knee amputation of left lower extremity ([facility] [address]  Precautions: FALL RISK     Subjective: I'm getting out of hear today, I'm fantastic. Pain at start of tx:0  Pain at stop of tx:0     Patient identified with name and :YES         Objective: Pt daughter present at therapy session for stair amb training in preparation for D/C to home today.  Pt performed stair training first with therapist and then with daughter with therapist supervision. With min-a, pt is able to amb stairs safely with daughter. GAIT Daily Assessment     Amount of Assistance: 5 (Supervision/setup)          STEPS or STAIRS Daily Assessment     Steps/Stairs Ambulated (#): 24  Level of Assist : 4 (Minimal assistance)  Rail Use: Both   Pt able to amb stairs with min-a with therapist standing posterior and slightly lateral to amputated side. Pt requires slight assist when performing actual step. Assessment: Pt able to perform ADL's and amb sufficient distances for home mobility. Pt ready for D/C. Pt reports rolling w/c is in transit from hospital for home use. 3             Plan of Care: See note on 9/7/17 for full Discharge Summary     [facility]  9/8/2017

## 2017-09-08 NOTE — HOME CARE
D/c noted for today - HCP will arrive by around noon to hook pt for IV abx at home - [facility] will follow - left voice mail for daughter letting her know plans - CARROLL Hall RN

## 2017-09-08 NOTE — FACE TO FACE
[facility] PHYSICAL REHABILITATION  [address] [place] [address], [address]    [address] TO [place] [address]    Name: Marcella Palmer Age / Sex: 59 y.o. / male   CSN: [id number] MRN: [id number]   [facility] Date: 8/25/2017 Discharge Date: 9/8/2017     Primary Care Provider: Dr. Ines Mcclellan      Primary Rehabilitation Diagnosis  1. Impaired Mobility and ADLs  2. S/P Left above-the-knee amputation (8/18/2017 - Dr. Courtney Alvarez)  3. History of ischemic rest pain of the left lower extremity due to severe peripheral vascular disease      Comorbidities   Benign hypertensive heart disease with systolic congestive heart failure, NYHA class 2  I11.0, I50.20    DDD (degenerative disc disease), lumbar M51.36    Erectile dysfunction N52.9    Spinal stenosis of lumbar region with radiculopathy M48.06, M54.16    Hereditary peripheral neuropathy G60.9    Aortoiliac occlusive disease  I74.09    Atherosclerosis of native artery of both lower extremities with intermittent claudication  I70.213    Peripheral artery disease  I73.9    Coronary artery disease involving native coronary artery of native heart I25.10    Paroxysmal atrial fibrillation  I48.0    Acute blood loss as cause of postoperative anemia D62    Anticoagulated on Coumadin Z51.81, Z79.01    Ischemic cardiomyopathy I25.5    Chronic systolic heart failure  P79.32    Carotid artery disease  I77.9    Dyslipidemia E78.5    Euthyroid sick syndrome E07.81    Aftercare following surgery of the circulatory system Z48.812    Status post femoral-popliteal bypass surgery Z95.828    History of cardioversion Z98.890    Critical ischemia of right lower extremity I99.8    History of vitamin D deficiency Z86.39    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    Infection of vascular bypass graft  T82. 7XXA    Chronic anemia D64.9    Chronic ulcer of right heel (HCC) L97.419    Prolonged Q-T interval on ECG R94.31    Aftercare for amputation stump Z47.81    Moderate to severe pulmonary hypertension  I27.2    Adverse effect of amiodarone T46.2X5A    Skin ulcer of malleolar area of right ankle  L97.319         History of the Present Illness: The patient is a 78-year-old White male with multiple medical comorbidities who was admitted to [facility] on 7/24/2017 due to severe sepsis. The patient was apparently well until ~2 days prior to admission, the patient woke upand was was unable to breath so he called 911 and he was brought to the [facility] Emergency Department. CT angiogram of the chest was negative for pulmonary embolism. 2D echocardiogram showed EF 50%, severe right heart dilatation with reduced right ventricular global systolic function. He was placed on pressors and diuresed with good improvement. His WBCs were noted to be elevated at 27.7K and he was started on intravenous antibiotics. Blood cultures were reportedly positive. He was noted to have an exposed graft in the left groin with purulent drainage. He denied any pain. He had improved clinically and had been weaned off pressors. He was transferred to [facility] for further treatment by his vascular surgeon. The patient was admitted under the service of Vascular Surgery (Dr. Brandt Jennings). WBC count was 13.9. Patient was continued on Piperacillin-Tazobactam and Vancomycin. Critical Care Medicine consult (Dr. Joseph Sage) was called for evaluation and comanagement. Cardiology consult (Dr. Carlee Mccoy) was called for evaluation and comanagement. The patient was brought to the operating room and underwent Removal of infected graft; Repair of left superficial femoral artery; Repair of left common femoral artery on 7/25/2017 done by Dr. Brandt Jennings. The patient tolerated the procedure well with no intraoperative complications. Blood culture drawn on 7/24/2017 yielded growth of MSSA.  Surgical wound culture done 7/25 yielded growth of MSSA. Infectious Disease consult (Dr. Cesilia Gil) was called for evaluation and comanagement. Piperacillin-Tazobactam and Vancomycin were discontinued and the patient was started on Nafcillin IV continuous infusion on 7/28/2017. Blood culture drawn on 7/28/2017 yielded no growth after 6 days. On 8/2/2017, the Nafcillin IV continuous infusion was changed to Cefazolin 2 grams IV q 8 hr. Blood culture drawn on 8/2/2017 yielded no growth after 6 days. Infectious Disease recommended the Cefazolin to be continued until 9/12/2017. The patient had remained hemodynamically stable but due to the above events, the patient was noted to be generally weak and with impaired mobility and ADLs. Patient was felt to be a good candidate for acute inpatient rehabilitation. Upon evaluation by Physical Therapy and Occupational Therapy, the patient was recommended for acute inpatient rehabilitation. WBC count on 8/7/2017 prior to discharge was 12.7. The patient was discharged and was subsequently admitted to the [facility] for Physical Rehabilitation on 8/7/2017 for intensive rehabilitation to help recover strength, function and mobility.     The patient was able to actively participate in the therapy activities but he has reported numbness from the middle of the left leg downwards. On 8/10/2017, Vascular Surgery (Pelon Polanco) was informed of the numbness and erythema of the shin of the left leg and she was aware of it; no diagnostic studies for now; Vascular Surgery will be evaluating the patient in the AM to see if he needs further surgical intervention. On 8/12/2017, patient complained of worsening pain of LLE at rest. WBC count (8/13/2017) = 12.4.  On 8/14/2017, patient was seen and evaluated by JUNE Polanco and discussed with Dr. Courtney Alvarez and it was felt that the patient has ischemic rest pain of the left lower extremity and the patient will be scheduled for removal of infected right bypass graft with jump bypass and left AKA on 8/18/2017. The patient was discharged / transferred to [facility] as an inpatient under the service of Vascular Surgery (Dr. Jono Ruiz) on 8/17/2017.      The patient underwent a Left above-the-knee amputation; Right axillary femoral jump bypass interposition; Removal of infected right axillary femoral bypass; Wound VAC placement of upper thigh 1.2 cm x 5.2 cm x 1.8 cm and groin 4 cm x 0.5 cm x 0.2 cm on 8/18/2017 done by Dr. Jono Ruiz. The patient tolerated the procedure well with no reported intraoperative complications. The patient developed acute postoperative blood loss anemia and he was transfused with pRBC. Intravenous Cefazolin was continued. On 8/19/2017, the patient was noted to have an altered mental status. The Rapid response Team was called. The patient was given Lorazepam and Phenobarbital. Three-point restraints had been utilized. The [facility] Hospitalist group (Dr. Miryam Keenan) was called for medical comanagement. Cefazolin was changed to Piperacillin-Tazobactam IV and Vancomycin IV on 8/19/2017. Impression was Acute metabolic encephalopathy. Patient was noted to have a prolonged QTc interval. Diltiazem and Amiodarone were put on hold. Metoprolol tartrate was continued. Cardiology consult (Dr. Lobo Murrieta) was called for evaluation and comanagement. Amiodarone was resumed. The confusion/alteration in mental status was noted to slowly improve. Infectious Disease consult (Dr. New Mcnair) was called for evaluation and comanagement. On 8/22/2017, Piperacillin-Tazobactam IV and Vancomycin IV were discontinued and the patient was placed on Ampicillin-Sulbactam IV to be given until 9/19/2017. A PICC line was inserted by Interventional Radiology (Dr. Mynor Rai) on 8/24/2017.  The patient had remained hemodynamically stable but due to the above events, the patient was noted to be generally weak and with impaired mobility and ADLs. Patient was felt to be a good candidate for acute inpatient rehabilitation. Upon evaluation by Physical Therapy and Occupational Therapy, the patient was recommended for acute inpatient rehabilitation. The patient was discharged and was subsequently admitted to the [facility] for Physical Rehabilitation on 8/7/2017 for intensive rehabilitation to help recover strength, function and mobility. Past Medical History:  Past Medical History:   Diagnosis Date    Acute blood loss as cause of postoperative anemia 4/4/2017    Anticoagulated on Coumadin     Aortoiliac occlusive disease (HCC) 1/25/2017    Atherosclerosis of native artery of both lower extremities with intermittent claudication ([address] 1/25/2017    Benign hypertensive heart disease with systolic congestive heart failure, NYHA class 2 ([address] 1/26/2015    Carotid artery disease (HCC)     Chronic anemia     Chronic systolic heart failure (HCC)     Chronic ulcer of right foot ([address] 4/4/2017    Chronic ulcer of right heel ([address] 8/8/2017    Coronary artery disease involving native coronary artery of native heart 3/15/2017    Successful stenting of Cx (Xience LESLEY) and RCA (Xience LESLEY) to 0% by Dr. Dulce Reyes on 3/15/17.     DDD (degenerative disc disease), lumbar 1/26/2015    Dyslipidemia     Erectile dysfunction 7/5/2016    Euthyroid sick syndrome 4/6/2017    Hereditary peripheral neuropathy 11/15/2016    History of cardioversion 4/18/2017    S/P Synchronized external cardioversion (4/18/2017 - Dr. Sridhar Romero)    History of vitamin D deficiency 4/22/2017    Vitamin D 25-Hydroxy (4/22/2017) = 12.0; Vitamin D 25-Hydroxy (5/26/2017) = 38.7    Infection of vascular bypass graft ([address] 7/24/2017    Left lower extremity    Ischemic cardiomyopathy     Moderate to severe pulmonary hypertension ([address] 7/23/2017    Paroxysmal atrial fibrillation ([address]     Peripheral artery disease ([address] 1/25/2017    Skin ulcer of malleolar area of right ankle ([address]     Spinal stenosis of lumbar region with radiculopathy 5/4/2015    Dr. Luke Bentno       Past Surgical History:  Past Surgical History:   Procedure Laterality Date    CARDIAC CATHETERIZATION  3/8/2017         CARDIAC CATHETERIZATION  3/15/2017         CORONARY STENT SINGLE W/PTCA  3/15/2017         HX ABOVE KNEE AMPUTATION Left 08/18/2017    S/P Left above-the-knee amputation (8/18/2017 - Dr. Atul Kirkland)    HX ATHERECTOMY Left 06/15/2017    S/P Atherectomy and balloon angioplasty of the left superficial femoral artery and above-knee popliteal artery (6/15/2017 - Dr. Atul Kirkland)    HX COLONOSCOPY  07/23/2015    polyps repeat 2020 5 years [address] Right 04/04/2017    S/P Right axillary to bifemoral artery bypass using an 8 mm Propaten graft from the right axilla to the right common femoral artery with a jump femoral-femoral bypass with another 8 mm Propaten external ring bypass; Right common femoral artery, and profunda femoris artery and superficial femoral artery endarterectomy causing an extensive surgery on the right side (4/4/2017 - Dr. Tamir Cruz)    [address] Right 04/07/2017    S/P Cutdown of femoral-femoral bypass, more on the left groin side; Right lower extremity angiography with first-order catheterization (4/7/2017 - Dr. Atul Kirkland)    HX FEMORAL BYPASS Right 04/10/2017    S/P Right femoral to above-knee popliteal bypass with an 8 mm PTFE graft (4/10/2017 - Dr. Swapna Velasquez)    HX OTHER SURGICAL Left 07/25/2017    S/P Removal of infected graft; Repair of left superficial femoral artery; Repair of left common femoral artery (7/25/2017 - Dr. Atul Kirkland)    HX OTHER SURGICAL Right 06/27/2017    S/P Drainage of right side abscess (6/27/2017 - Dr. Atul Kirkland)    HX OTHER SURGICAL Left 07/01/2017    S/P Left groin exploration;  Left femoral repair pseudoaneurysm; Left wound washout; Wound VAC placement (7/01/2017 - Dr. Courtney Alvarez)    HX OTHER SURGICAL Left 07/04/2017    S/P Left common femoral artery ligation; Jump bypass from right to left fem-fem to a more distal superficial femoral artery using 8 mm external ringed Propaten graft; Left groin wound exploration and washout (7/4/2017 - Dr. Courtney Alvarez)    HX OTHER SURGICAL Right 08/18/2017    S/P Right axillary femoral jump bypass interposition; Removal of infected right axillary femoral bypass; Wound VAC placement of upper thigh 1.2 cm x 5.2 cm x 1.8 cm and groin 4 cm x 0.5 cm x 0.2 cm (8/18/2017 - Dr. Courtney Alvarez)       Medications on Discharge:    Current Discharge Medication List      START taking these medications    Details   ampicillin-sulbactam 3 gram 3 g IVPB 3 g by IntraVENous route every six (6) hours for 11 days. [STOP DATE: 9/19/2017]  Qty: 44 Dose, Refills: 0    Associated Diagnoses: Infection of vascular bypass graft, subsequent encounter      potassium chloride (KLOR-CON) 20 mEq packet Take 1 Packet by mouth daily. [while on Furosemide]  Indications: hypokalemia prevention  Qty: 15 Packet, Refills: 0         CONTINUE these medications which have CHANGED    Details   warfarin (COUMADIN) 5 mg tablet Take 1 Tab by mouth nightly. [Dose to be adjusted by Cardiology with a target INR of 2 to 3]  Qty: 15 Tab, Refills: 0    Associated Diagnoses: Paroxysmal atrial fibrillation ([address] Anticoagulated on Coumadin      losartan (COZAAR) 25 mg tablet Take 2 Tabs by mouth daily. Indications: Chronic Heart Failure, hypertension  Qty: 15 Tab, Refills: 0    Associated Diagnoses: Benign hypertensive heart disease with systolic congestive heart failure, NYHA class 2 ([facility])      oxyCODONE-acetaminophen (PERCOCET) 5-325 mg per tablet Take 1 Tab by mouth every four (4) hours as needed (for moderate to severe pain). Max Daily Amount: 6 Tabs.  Indications: Pain  Qty: 50 Tab, Refills: 0 Associated Diagnoses: Status post above knee amputation of left lower extremity ([facility] [address]         CONTINUE these medications which have NOT CHANGED    Details   metoprolol tartrate (LOPRESSOR) 25 mg tablet Take 1 Tab by mouth every twelve (12) hours. Indications: hypertension, VENTRICULAR RATE CONTROL IN ATRIAL FIBRILLATION  Qty: 30 Tab, Refills: 6      gabapentin (NEURONTIN) 300 mg capsule Take 1 Cap by mouth every eight (8) hours. Indications: NEUROPATHIC PAIN  Qty: 45 Cap, Refills: 0    Associated Diagnoses: Hereditary peripheral neuropathy      magnesium oxide (MAG-OX) 400 mg tablet Take 1 Tab by mouth daily. Qty: 15 Tab, Refills: 0      furosemide (LASIX) 20 mg tablet 1 tablet daily  Qty: 30 Tab, Refills: 0      amiodarone (CORDARONE) 200 mg tablet Take 1 Tab by mouth daily. Indications: VENTRICULAR RATE CONTROL IN ATRIAL FIBRILLATION  Qty: 60 Tab, Refills: 0      aspirin 81 mg chewable tablet Take 1 Tab by mouth daily (with breakfast). Qty: 90 Tab, Refills: 3      cholecalciferol (VITAMIN D3) 1,000 unit tablet Take 1 Tab by mouth daily. Qty: 90 Tab, Refills: 3      ascorbic acid, vitamin C, (VITAMIN C) 250 mg tablet Take 1 Tab by mouth daily (with breakfast). Qty: 90 Tab, Refills: 2      zinc sulfate (ZINCATE) 220 (50) mg capsule Take 1 Cap by mouth daily. Qty: 90 Cap, Refills: 0      ferrous sulfate 325 mg (65 mg iron) tablet Take 1 Tab by mouth daily (with breakfast). Qty: 90 Tab, Refills: 3      atorvastatin (LIPITOR) 40 mg tablet Take 1 Tab by mouth nightly. Indications: DYSLIPIDEMIA  Qty: 90 Tab, Refills: 3      cyanocobalamin (VITAMIN B-12) 1,000 mcg tablet Take 1 Tab by mouth daily. Qty: 90 Tab, Refills: 3         STOP taking these medications       dilTIAZem (CARDIZEM) 30 mg tablet Comments:   Reason for Stopping:         DULoxetine (CYMBALTA) 60 mg capsule Comments:   Reason for Stopping:               Condition on Discharge: Stable.     Ambulation Gait  Amount of Assistance: 5 (Supervision/setup)  Distance (ft): 80 Feet (ft) (x 2 trials)  Assistive Device: Walker, rolling     Wheelchair Mobility Wheelchair Mobility/Management  Able to Propel (ft): 300 feet  Functional Level: 6  Curbs/Ramps Assist Required (FIM Score): 6 (Modified independent)  Wheelchair Setup Assist Required : 6 (Modified independent)  Wheelchair Management: Manages left brake, Manages right brake, Manages right footrest         Disposition: Patient clinically improved and was discharged to home with home health physical therapy, occupational therapy and skilled nursing. The patient is temporarily homebound secondary to functional deficits after undergoing a Left above-the-knee amputation (8/18/2017 - Dr. Tres Jang). He can ambulate using a rolling walker (see above). The patient would benefit from continued skilled physical therapy in order to improve independent functional mobility within the home with use of least restrictive device. The patient would also benefit from continued skilled occupational therapy in order to improve self care and functional mobility within the home with use of least restrictive device. The patient would also benefit from continued skilled speech therapy in order to work on cognition, swallowing and speech restoration. Short-term skilled nursing is needed for medication reconciliation, PT/INR monitoring and wound care (post-op wound of left AKA stump, right torso wounds, right ankle wound and right heel wound). PT/INR post-discharge care of Cardiology (Dr. Lawanda Peterson). Next PT/INR post-discharge is on Monday (9/11/2017) with results to be sent to the office of Dr. Lawanda Peterson      Due to the abovementioned data, I certify that the patient needs intermittent Skilled Nursing, Physical Therapy and Occupational Therapy. I will NOT be following this patient in the Community and Dr. Ji Rene will be responsible for signing the [address] of Care.     In compliance with the Affordable Care Act, I certify that this patient was managed by me during this hospitalization and that I had a Face-to-Face Encounter that meets the physician Face-to-Face Encounter requirements.       Signed:    Raiza Reeves MD    September 8, 2017

## 2017-09-08 NOTE — PROGRESS NOTES
[x] Psychology  [] Social Work [] Recreational Therapy    INTERVENTION  UNITS/TIME OF SERVICE   Assessment    Supportive Counseling September 8, 2017   Orientation    Discharge Planning    Resource Linkage              Progress/Current Status    Patient seen for individual support  in anticipation of discharge from ARU to home, shortly. Patient will be with his daughter and her boyfriend in [place] residence until he is in a position to be more independent. Patient remains very positive in his thinking; in fact, his mood is buoyant as he describes how exciting he is to get out of hospital after very prolonged stay and, obviously, very traumatic circumstances and the loss of his leg. Patient is cautioned about \"getting ahead of himself\" and the importance that he utilize safety awareness and appropriate pace in his continued recovery. Patient seems to be so fiercely independent in his thinking and behavioral style, that he probably will have some difficulty having to acquiesce to the demands of others, including family; in turn, he needs to better address the forced changes with which he will have to endure for the near term.     María Patel, PHD 9/8/2017 11:11 AM

## 2017-09-08 NOTE — PROGRESS NOTES
Dary received a message from Massiel Floyd with [employer] asking for clinical information to open the auth case (history and physical, initial therapy notes, current therapy notes, medical progress notes, wound notes). Dary faxed to [phone] per request and left a voicemail on [phone] to advise of case being faxed. Pt has been dc to home with [facility], [facility] for IV meds and dme. 9/12/17 RETRO AUTH FROM [facility] IS OBTAINED Dates: 9/1/17 - 9/7/17 with dc on 9/8/17 Auth #[id number].

## 2017-09-08 NOTE — PROGRESS NOTES
Discharged to home with daughter. Discharge instructions reviewed and patient verbalized understanding. W/C and IV pump with patient.

## 2017-09-08 NOTE — DISCHARGE INSTRUCTIONS
DISCHARGE SUMMARY from Nurse    The following personal items are in your possession at time of discharge:    Dental Appliances: Uppers, Lowers  Visual Aid: None     Home Medications: None  Jewelry: None  Clothing: None  Other Valuables: Cell Phone             PATIENT INSTRUCTIONS:    After general anesthesia or intravenous sedation, for 24 hours or while taking prescription Narcotics:  · Limit your activities  · Do not drive and operate hazardous machinery  · Do not make important personal or business decisions  · Do  not drink alcoholic beverages  · If you have not urinated within 8 hours after discharge, please contact your surgeon on call. Report the following to your surgeon:  · Excessive pain, swelling, redness or odor of or around the surgical area  · Temperature over 100.5  · Nausea and vomiting lasting longer than 4 hours or if unable to take medications  · Any signs of decreased circulation or nerve impairment to extremity: change in color, persistent  numbness, tingling, coldness or increase pain  · Any questions        What to do at Home:    *  Please give a list of your current medications to your Primary Care Provider. *  Please update this list whenever your medications are discontinued, doses are      changed, or new medications (including over-the-counter products) are added. *  Please carry medication information at all times in case of emergency situations. These are general instructions for a healthy lifestyle:    No smoking/ No tobacco products/ Avoid exposure to second hand smoke    Surgeon General's Warning:  Quitting smoking now greatly reduces serious risk to your health.     Obesity, smoking, and sedentary lifestyle greatly increases your risk for illness    A healthy diet, regular physical exercise & weight monitoring are important for maintaining a healthy lifestyle    You may be retaining fluid if you have a history of heart failure or if you experience any of the following symptoms:  Weight gain of 3 pounds or more overnight or 5 pounds in a week, increased swelling in our hands or feet or shortness of breath while lying flat in bed. Please call your doctor as soon as you notice any of these symptoms; do not wait until your next office visit. Recognize signs and symptoms of STROKE:    F-face looks uneven    A-arms unable to move or move unevenly    S-speech slurred or non-existent    T-time-call 911 as soon as signs and symptoms begin-DO NOT go       Back to bed or wait to see if you get better-TIME IS BRAIN. Warning Signs of HEART ATTACK     Call 911 if you have these symptoms:   Chest discomfort. Most heart attacks involve discomfort in the center of the chest that lasts more than a few minutes, or that goes away and comes back. It can feel like uncomfortable pressure, squeezing, fullness, or pain.  Discomfort in other areas of the upper body. Symptoms can include pain or discomfort in one or both arms, the back, neck, jaw, or stomach.  Shortness of breath with or without chest discomfort.  Other signs may include breaking out in a cold sweat, nausea, or lightheadedness. Don't wait more than five minutes to call 911 - MINUTES MATTER! Fast action can save your life. Calling 911 is almost always the fastest way to get lifesaving treatment. Emergency Medical Services staff can begin treatment when they arrive -- up to an hour sooner than if someone gets to the hospital by car. The discharge information has been reviewed with the patient and caregiver. The patient and caregiver verbalized understanding. Discharge medications reviewed with the patient and caregiver and appropriate educational materials and side effects teaching were provided. Patient armband removed and shredded    MyChart Activation    Thank you for requesting access to [employer]. Please follow the instructions below to securely access and download your online medical record.  [employer] allows you to send messages to your doctor, view your test results, renew your prescriptions, schedule appointments, and more. How Do I Sign Up? 1. In your internet browser, go to [email or web address]  2. Click on the First Time User? Click Here link in the Sign In box. You will be redirect to the New Member Sign Up page. 3. Enter your [employer] Access Code exactly as it appears below. You will not need to use this code after youve completed the sign-up process. If you do not sign up before the expiration date, you must request a new code. [employer] Access Code: Activation code not generated  Current [employer] Status: Patient Declined (This is the date your [employer] access code will )    4. Enter the last four digits of your Social Security Number (xxxx) and Date of Birth (mm/dd/yyyy) as indicated and click Submit. You will be taken to the next sign-up page. 5. Create a [employer] ID. This will be your [employer] login ID and cannot be changed, so think of one that is secure and easy to remember. 6. Create a [employer] password. You can change your password at any time. 7. Enter your Password Reset Question and Answer. This can be used at a later time if you forget your password. 8. Enter your e-mail address. You will receive e-mail notification when new information is available in [address] 9. Click Sign Up. You can now view and download portions of your medical record. 10. Click the Download Summary menu link to download a portable copy of your medical information. Additional Information    If you have questions, please visit the Frequently Asked Questions section of the [employer] website at [email or web address] Remember, [employer] is NOT to be used for urgent needs. For medical emergencies, dial 911.                  ------------------------------------------------------------------------------------------------------------    DISCHARGE INSTRUCTIONS    1.  Make sure that when you request refills at the pharmacy that the refill requests are sent to your PCP (NOT to the prescriber at the [facility]) to avoid any delays in getting your medication refills. The physician at the [facility] [address] will not able to order medications or refills after discharge -- Please understand that though we would like to help, it is simply not safe for our physician to order you a medication that we cannot monitor. 2. If any of the prescribed medications require a prior authorization, contact your Primary Care Physician or specialist to EITHER complete prior authorizations and paperwork on your behalf OR prescribe an alternative medication. -- Please understand that though we would like to help, it is simply not safe for our physician to order you a medication that we cannot monitor. 3. Over-the-counter (OTC) medications will NOT be prescribed. You need to buy these medications over-the-counter.     -------------------------------------------------------------------------------------------------------------------

## 2017-09-09 ENCOUNTER — HOME CARE VISIT (OUTPATIENT)
Dept: SCHEDULING | Facility: HOME HEALTH | Age: 57
End: 2017-09-09
Payer: COMMERCIAL

## 2017-09-09 PROCEDURE — G0299 HHS/HOSPICE OF RN EA 15 MIN: HCPCS

## 2017-09-09 PROCEDURE — 400013 HH SOC

## 2017-09-11 ENCOUNTER — HOME CARE VISIT (OUTPATIENT)
Dept: HOME HEALTH SERVICES | Facility: HOME HEALTH | Age: 57
End: 2017-09-11
Payer: COMMERCIAL

## 2017-09-11 ENCOUNTER — DOCUMENTATION ONLY (OUTPATIENT)
Dept: VASCULAR SURGERY | Age: 57
End: 2017-09-11

## 2017-09-11 ENCOUNTER — HOME CARE VISIT (OUTPATIENT)
Dept: SCHEDULING | Facility: HOME HEALTH | Age: 57
End: 2017-09-11
Payer: COMMERCIAL

## 2017-09-11 ENCOUNTER — TELEPHONE ANTICOAG (OUTPATIENT)
Dept: CARDIOLOGY CLINIC | Age: 57
End: 2017-09-11

## 2017-09-11 ENCOUNTER — TELEPHONE (OUTPATIENT)
Dept: CARDIOLOGY CLINIC | Age: 57
End: 2017-09-11

## 2017-09-11 VITALS
DIASTOLIC BLOOD PRESSURE: 70 MMHG | OXYGEN SATURATION: 97 % | SYSTOLIC BLOOD PRESSURE: 120 MMHG | TEMPERATURE: 99.4 F | HEART RATE: 73 BPM

## 2017-09-11 DIAGNOSIS — Z79.01 ANTICOAGULATED ON COUMADIN: ICD-10-CM

## 2017-09-11 DIAGNOSIS — I48.0 PAROXYSMAL ATRIAL FIBRILLATION (HCC): ICD-10-CM

## 2017-09-11 LAB
INR BLD: 2.5 (ref 0.9–1.1)
INR, EXTERNAL: 2.5
PT POC: 30.6 SECONDS (ref 11.8–14.9)

## 2017-09-11 PROCEDURE — G0299 HHS/HOSPICE OF RN EA 15 MIN: HCPCS

## 2017-09-11 PROCEDURE — G0151 HHCP-SERV OF PT,EA 15 MIN: HCPCS

## 2017-09-11 NOTE — TELEPHONE ENCOUNTER
Daughter called in. She stated that her father is on Coumadin and amiodarone, however states that her father's plavix was discontinued during last admission where Dr Tania Rodriguez performed left leg above knee amputation. She states that she heard that one can't stop plavix if he is on it. I have contacted Dr Jack Larson office in order to clarify that he intended to discontinue Plavix and only wants patient on Coumadin. Staff @([phone]) took this as a STAT message and will relay it to the Dr Jack Larson nurse.  They will be calling back my number [phone]

## 2017-09-11 NOTE — PROGRESS NOTES
Fabi willoughby nurse from [facility] called and needs to know if patient is supposed to be off coumadin and on Plavix, he doesn't see any such orders.  Please call him @ [phone]

## 2017-09-11 NOTE — TELEPHONE ENCOUNTER
Domingo Lantigua RN called from Dr Marisol Pereira office. She stated that patient is not stopped from Plavix. Informed patient's daughter.   She will start it today at 2 pm and will give another dose tomorrow @ 9 am.

## 2017-09-12 ENCOUNTER — HOME CARE VISIT (OUTPATIENT)
Dept: SCHEDULING | Facility: HOME HEALTH | Age: 57
End: 2017-09-12
Payer: COMMERCIAL

## 2017-09-12 ENCOUNTER — PATIENT OUTREACH (OUTPATIENT)
Dept: FAMILY MEDICINE CLINIC | Age: 57
End: 2017-09-12

## 2017-09-12 VITALS
SYSTOLIC BLOOD PRESSURE: 118 MMHG | DIASTOLIC BLOOD PRESSURE: 76 MMHG | RESPIRATION RATE: 15 BRPM | OXYGEN SATURATION: 98 % | TEMPERATURE: 98.9 F | HEART RATE: 89 BPM

## 2017-09-12 VITALS — OXYGEN SATURATION: 97 % | DIASTOLIC BLOOD PRESSURE: 64 MMHG | HEART RATE: 60 BPM | SYSTOLIC BLOOD PRESSURE: 138 MMHG

## 2017-09-12 PROCEDURE — G0157 HHC PT ASSISTANT EA 15: HCPCS

## 2017-09-12 NOTE — PROGRESS NOTES
NNTOCIP  Patient admitted to [facility] on 8/17/17 to 8/25/17 for vascular problems, . Patient then went to ARU from 8/25/17-9/8/17  With a readmission for Angiography and angioplasty on 9/7/17      Course of hospitalization per discharge summary:  Ximena Cardona 63: Upon admission to the [facility], the patient underwent physical therapy, occupational therapy and speech therapy. The patient was able to actively participate in the rehabilitation activities and progressed well. On discharge, the patient was able to perform the following activities:     1. Occupational Therapy     ON ADMISSION ON DISCHARGE   Eating  Functional Level: 6 Eating  Functional Level: 6   Grooming  Functional Level: 7 Grooming  Functional Level: 7   Bathing  Functional Level: 4 Bathing  Functional Level: 6   Upper Body Dressing  Functional Level: 7 Upper Body Dressing  Functional Level: 7   Lower Body Dressing  Functional Level: 4 Lower Body Dressing  Functional Level: 5   Toileting  Functional Level: 3 Toileting  Functional Level: 6   Toilet Transfers  Toilet Transfer Score: 4 Toilet Transfers  Toilet Transfer Score: 5   Tub /Shower Transfers  Tub/Shower Transfer Score: 4 Tub/Shower Transfers  Tub/Shower Transfer Score: 5      2.  Physical Therapy     ON ADMISSION ON DISCHARGE   Wheelchair Mobility/Management  Able to Propel (ft): 250 feet  Functional Level: 6  Curbs/Ramps Assist Required (FIM Score): 6 (Modified independent)  Wheelchair Setup Assist Required : 6 (Modified independent)  Wheelchair Management: Manages right brake, Manages left brake Wheelchair Mobility/Management  Able to Propel (ft): 300 feet  Functional Level: 6  Curbs/Ramps Assist Required (FIM Score): 6 (Modified independent)  Wheelchair Setup Assist Required : 6 (Modified independent)  Wheelchair Management: Manages left brake, Manages right brake, Manages right footrest   Gait  Amount of Assistance: 4 (Contact guard assistance)  Distance (ft): 40 Feet (ft) (x2 reps)  Assistive Device: Walker, rolling Gait  Amount of Assistance: 5 (Supervision/setup)  Distance (ft): 80 Feet (ft) (x 2 trials)  Assistive Device: Walker, rolling   Balance-Sitting/Standing  Sitting - Static: Normal   Sitting - Dynamic: Normal  Standing - Static: Fair, Constant support (with RW)  Standing - Dynamic : Impaired Balance-Sitting/Standing  Sitting - Static: Good (unsupported)  Sitting - Dynamic: Good (unsupported)  Standing - Static: Fair  Standing - Dynamic : Impaired   Bed/Mat Mobility  Rolling Right : 7 (Independent)  Rolling Left : 7 (Independent)  Supine to Sit : 7 (Independent)  Sit to Supine : 7 (Independent) Bed/Mat Mobility  Rolling Right : 7 (Independent)  Rolling Left : 7 (Independent)  Supine to Sit : 7 (Independent)  Sit to Supine : 7 (Independent)   Transfers  Transfer Type: Other  Other: Stand-step with RW with CGA/SBA  Transfer Assistance : 4 (Contact guard assistance)  Sit to Stand Assistance: Contact guard assistance  Car Transfers: Not tested  Car Type: n/a Transfers  Transfer Type: Other  Other: Patient performs squat pivot transfer w/o AD and stand hop transfer RW. Patient completes squat pivot transfer w/o AD requiring no physical assist or verbal cueing from therapist to complete safely; performed x 4 in session.  Patient also perfomrs stand hop transfer with RW to challenge transfers in home/non ideal environement; patinet completes transfer x 12 in session with Mod I demosntrating good hand position, pace, and technique  Transfer Assistance : 6 (Modified independent)  Sit to Stand Assistance: Modified independent  Car Transfers: Independent  Car Type: Car simulator   Steps or Stairs  Steps/Stairs Ambulated (#):  (3 4 inch)  Level of Assist : 0 (Not tested)  Rail Use: Both Steps or Stairs  Steps/Stairs Ambulated (#): 12  Level of Assist : 4 (Minimal assistance)  Rail Use: Both (41\" wide B HR)      3. Speech and Language Pathology     ON ADMISSION ON DISCHARGE   Comprehension (Native Language)  Primary Mode of Comprehension: Auditory  Score: 6 Comprehension (Native Language)  Primary Mode of Comprehension: Auditory  Score: 6   Expression (Native Language)  Primary Mode of Expression: Verbal  Score: 6 Expression (Native Language)  Primary Mode of Expression: Verbal  Score: 6   Social Interaction/Pragmatics  Score: 5 Social Interaction/Pragmatics  Score: 5   Problem Solving  Score: 4 Problem Solving  Score: 5   Memory  Score: 4 Memory  Score: 5      Legend:                           7 - Independent                           6 - Modified Independent                           5 - Standby Assistance / Supervision / Set-up                           4 - Minimum Assistance / Contact Guard Assistance                           3 - Moderate Assistance                           2 - Maximum Assistance                           1 - Total Assistance / Dependent         ACUTE MEDICAL ISSUES ADDRESSED IN INPATIENT REHABILITATION FACILITY:      > S/P Left above-the-knee amputation (8/18/2017 - Dr. Catherine Vaz); History of ischemic rest pain of the left lower extremity due to severe peripheral artery disease                        > Wound vac dressing changes by staff nurses every Monday, Wednesday, & Friday on day shift. Measure wound size & document with each dressing change. Settings: 125mmHG low continuous suction. Measure wound size & document with each dressing change. Change canister when 3/4 full. May use saline dressings daily until wound vac initiated.                        > Mepilex Border dressings to left AKA staple line q2days & prn soilage.  Wrap with ace wrap in figure 8 form.                        > WoundVac was discontinued on 9/6/2017                        > Staples to be removed on 9/15/2017     > S/P Removal of infected graft; Repair of left superficial femoral artery; Repair of left common femoral artery (7/25/2017 - Dr. Catherine Vaz); History of sepsis associated with infection of vascular bypass graft                        > Continue Ampicillin Sulbactam 3 grams IVPB q 6 hr (STOP DATE: 9/19/2017)     > Acute Postoperative Blood Loss Anemia                        > Hgb/Hct (8/24/2017, prior to admission to the ARU) = 8.4/25.6                        > Anemia work-up (8/22/2017) showed serum iron 15, TIBC 146, iron % saturation 10                        > Hgb/Hct (8/26/2017, on admission) = 8.6/26.5                        > Reticulocyte count (8/26/2017) = 4.7                        > Ferritin (8/26/2017) = 1066                        > Hgb/Hct (8/28/2017) = 9.0/27.9                         > Hgb/Hct (8/31/2017) = 8.4/26.4                        > Hgb/Hct (9/3/2017) = 8.6/26.4                         > Hgb/Hct (9/4/2017) = 8.7/27.5                         > Continue:                                              > FeSO4 325 mg PO once daily with breakfast                                               > Ascorbic Acid 250 mg PO once daily with breakfast (to enhance the absorption of the FeSO4)      > Benign hypertensive heart disease with chronic systolic heart failure                        > On 8/26/2017, increased Losartan from 50 mg to 100 mg PO once daily (6AM)                        > On 9/6/2017, decreased Losartan from 100 mg to 50 mg PO once daily (6AM)                        > Continue:                                              > Furosemide 20 mg PO once daily (6AM)                                              > Losartan 50 mg PO once daily (6AM)                                              > Metoprolol tartrate 25 mg PO q 12 hr (9AM, 9PM)     > Paroxysmal atrial fibrillation, presently normal sinus rhythm, anticoagulated on Coumadin                        > Continue:                                              > Amiodarone 200 mg PO once daily                                              > Metoprolol tartrate 25 mg PO q 12 hr (9AM, 9PM)                        > Coumadin had been held by Vascular Surgery from 9/5/2017 to 9/6/2017                        > Target INR = 2 to 3                        > INR 1.6                        > Patient received Coumadin 7.5 mg on the night prior to discharge                        > Will discharge patient on Coumadin 5 mg PO q HS     > Chronic ulcer of right heel                         > Apply rigid heel suspension boots on both feet when supine in bed                        > Podiatry follow-up (Dr. Ada Orozco) called by Vascular Surgery for comanagement                        > Arterial duplex ultrasound (8/29/2017) showed patent common femoral to popliteal artery bypass graft without evidence of a hemodynamically significant stenosis.                        > S/P Atherectomy and balloon angioplasty of the below-the-knee popliteal artery, above-the-knee popliteal artery, tibioperoneal trunk artery and posterior tibial artery (9/7/2017 - Dr. Jono Ruiz)                        > Continue:                                              > Ascorbic acid 250 mg PO once daily with breakfast                                              > Zinc sulfate 220 mg PO once daily     > Skin ulcer of malleolar area of right ankle                        > Cleanse wound with saline, apply Aquacel ag dressing, Mepilex Border to right lateral ankle wound every 48hrs & prn soilage.     > Analgesia                        > Continue:                                              > Acetaminophen 650 mg PO q 4 hr PRN for pain level less than 5/10                                              > Percocet 5/325 1 tab PO q 4 hr PRN for pain level greater than 4/10       Pertinent labs:  Results for Sophie Dinh (MRN [id number]) as of 9/12/2017 15:19   Ref.  Range 9/8/2017 06:13 9/11/2017 00:00 9/11/2017 12:37   INR Latest Ref Range: 0.9 - 1.1  1.4 (H)  2.5 (A)   Prothrombin time Latest Ref Range: 11.5 - 15.2 sec 16.7 (H) Prothrombin time (POC) Latest Ref Range: 11.8 - 14.9 seconds   30.6 (A)   INR, External Unknown  2.5      Inpatient Consults per discharge summary:  CONSULTS CALLED:   1. Wound Care Nurse  2. Vascular Surgery (Dr. Estelle Brannon)    Discharge diagnoses per discharge summary :   Primary Rehabilitation Diagnosis  1. Impaired Mobility and ADLs  2. S/P Left above-the-knee amputation (8/18/2017 - Dr. Estelle Brannon)  3. History of ischemic rest pain of the left lower extremity due to severe peripheral vascular disease      Comorbidities   Benign hypertensive heart disease with systolic congestive heart failure, NYHA class 2  I11.0, I50.20    DDD (degenerative disc disease), lumbar M51.36    Erectile dysfunction N52.9    Spinal stenosis of lumbar region with radiculopathy M48.06, M54.16    Hereditary peripheral neuropathy G60.9    Aortoiliac occlusive disease  I74.09    Atherosclerosis of native artery of both lower extremities with intermittent claudication  I70.213    Peripheral artery disease  I73.9    Coronary artery disease involving native coronary artery of native heart I25.10    Paroxysmal atrial fibrillation  I48.0    Acute blood loss as cause of postoperative anemia D62    Anticoagulated on Coumadin Z51.81, Z79.01    Ischemic cardiomyopathy I25.5    Chronic systolic heart failure  U11.95    Carotid artery disease  I77.9    Dyslipidemia E78.5    Euthyroid sick syndrome E07.81    Aftercare following surgery of the circulatory system Z48.812    Status post femoral-popliteal bypass surgery Z95.828    History of cardioversion Z98.890    Critical ischemia of right lower extremity I99.8    History of vitamin D deficiency Z86.39    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    Infection of vascular bypass graft  T82. 7XXA    Chronic anemia D64.9    Chronic ulcer of right heel (HCC) L97.419    Prolonged Q-T interval on ECG R94.31    Aftercare for amputation stump Z47.81    Moderate to severe pulmonary hypertension  I27.2    Adverse effect of amiodarone T46.2X5A    Skin ulcer of malleolar area of right ankle  L97.319       RRAT Score: 19  CCI: 3  Hospital admissions in past year: 10  ED admissions in past year: 1 at [facility] was transferred to [facility]     Contacted patient for hospital follow up. Introduced self, role and reason for call. Verified 2 patient identifiers. Patient reports:  Feeling better worked with PT today getting down the steps on his butt. PT says he is doing good, hardest part is sitting on the floor with the walker. [place] intact and he hopes they will come out next week when he sees doctor. Patient denies:  Any problems at this time  ADL's:  Preforms all ADL's  by self with some dificulty difficulty     DME:   walker  Resources/Support:   Patient has adequate support at home  and has no detectable resource needs at this time. AMD:   On file  Reconciled home medications and reviewed allergies. Instructed to bring all medications with him to next appointment. Appointments:  9/19/2017 11:45 AM Shelia Otero MD [address] [place] [address] [phone]     9/21/2017 9:00  [facility], [address] [facility]     10/5/2017 10:40 AM Becki Vela MD [facility]       Patient aware of appointments. Daughter will provide transportation. Potential Barriers to care  No apparent barriers to care identified at this time. Adherence to previous treatment and likelihood for follow-up:  Patient verbalized understanding of discharge instructions and need for follow up. Plan of Care/Goals:    Goals        Patient Stated     Returns to baseline activity level. (pt-stated)            Patient will be able to return home to live independently      [place] to baseline activity level. (pt-stated)            Return back to work soon         Other     Attends follow-up appointments as directed.             Patient will keep all scheduled appointments            This represents Transitions of Care. Nurse Navigator spoke with patient within 1-2 business day(s) of discharge. Patient's transition of care follow up appointment is scheduled with Dr. Pili Monroe on 9/19/2017 at [phone] which is within 7-14 days of discharge.

## 2017-09-13 ENCOUNTER — OFFICE VISIT (OUTPATIENT)
Dept: VASCULAR SURGERY | Age: 57
End: 2017-09-13

## 2017-09-13 DIAGNOSIS — Z47.81 AFTERCARE FOR AMPUTATION STUMP: Primary | ICD-10-CM

## 2017-09-14 ENCOUNTER — HOME CARE VISIT (OUTPATIENT)
Dept: HOME HEALTH SERVICES | Facility: HOME HEALTH | Age: 57
End: 2017-09-14
Payer: COMMERCIAL

## 2017-09-14 ENCOUNTER — TELEPHONE ANTICOAG (OUTPATIENT)
Dept: CARDIOLOGY CLINIC | Age: 57
End: 2017-09-14

## 2017-09-14 ENCOUNTER — TELEPHONE (OUTPATIENT)
Dept: VASCULAR SURGERY | Age: 57
End: 2017-09-14

## 2017-09-14 ENCOUNTER — HOME CARE VISIT (OUTPATIENT)
Dept: SCHEDULING | Facility: HOME HEALTH | Age: 57
End: 2017-09-14
Payer: COMMERCIAL

## 2017-09-14 ENCOUNTER — ANESTHESIA EVENT (OUTPATIENT)
Dept: CARDIOTHORACIC SURGERY | Age: 57
End: 2017-09-14
Payer: COMMERCIAL

## 2017-09-14 VITALS
HEART RATE: 62 BPM | RESPIRATION RATE: 20 BRPM | DIASTOLIC BLOOD PRESSURE: 56 MMHG | OXYGEN SATURATION: 99 % | TEMPERATURE: 97.2 F | SYSTOLIC BLOOD PRESSURE: 126 MMHG

## 2017-09-14 DIAGNOSIS — I48.0 PAROXYSMAL ATRIAL FIBRILLATION (HCC): ICD-10-CM

## 2017-09-14 DIAGNOSIS — Z79.01 ANTICOAGULATED ON COUMADIN: ICD-10-CM

## 2017-09-14 LAB
INR BLD: 2.7 (ref 0.9–1.1)
INR, EXTERNAL: 2.7
PT POC: 32.5 SECONDS (ref 11.8–14.9)
PT, EXTERNAL: 32.5

## 2017-09-14 PROCEDURE — G0152 HHCP-SERV OF OT,EA 15 MIN: HCPCS

## 2017-09-14 PROCEDURE — G0299 HHS/HOSPICE OF RN EA 15 MIN: HCPCS

## 2017-09-14 NOTE — PROGRESS NOTES
Patient has been holding Coumadin since 9/13/17 for planned vascular surgery on 9/15/17. Called Dr. Marisol Pereira office to inform them of patient's INR. Patient still having surgery tomorrow, will restart Coumadin on Saturday per Miguel's office.

## 2017-09-14 NOTE — PROGRESS NOTES
Verbal order and read back per Zeke Wilder NP  The INR is stable and therapeutic however patient is holding Coumadin for procedure tomorrow and will restart on Saturday. Coumadin 2.5 mg daily except 5 mg on Tue, Thu and Sat. Recheck INR on Wed  No answer, left instructions on [facility] [employer] voicemail and asked her to call office to verify receipt of message.  Shemar Reyes, ANURADHAN

## 2017-09-14 NOTE — TELEPHONE ENCOUNTER
Cardio called to let us know that the pts home health called them to report the pts INR at 2.7. Pt is scheduled with dr Harry Perez tomorrow for surgery and he is aware of the pts INR level.

## 2017-09-15 ENCOUNTER — HOSPITAL ENCOUNTER (OUTPATIENT)
Age: 57
Setting detail: OUTPATIENT SURGERY
Discharge: HOME | End: 2017-09-15
Attending: SURGERY | Admitting: SURGERY
Payer: COMMERCIAL

## 2017-09-15 ENCOUNTER — ANESTHESIA (OUTPATIENT)
Dept: CARDIOTHORACIC SURGERY | Age: 57
End: 2017-09-15
Payer: COMMERCIAL

## 2017-09-15 ENCOUNTER — TELEPHONE (OUTPATIENT)
Dept: FAMILY MEDICINE CLINIC | Age: 57
End: 2017-09-15

## 2017-09-15 ENCOUNTER — HOME CARE VISIT (OUTPATIENT)
Dept: HOME HEALTH SERVICES | Facility: HOME HEALTH | Age: 57
End: 2017-09-15
Payer: COMMERCIAL

## 2017-09-15 ENCOUNTER — TELEPHONE (OUTPATIENT)
Dept: CARDIOLOGY CLINIC | Age: 57
End: 2017-09-15

## 2017-09-15 VITALS
TEMPERATURE: 98 F | HEART RATE: 61 BPM | DIASTOLIC BLOOD PRESSURE: 67 MMHG | WEIGHT: 140 LBS | SYSTOLIC BLOOD PRESSURE: 121 MMHG | BODY MASS INDEX: 20.04 KG/M2 | HEIGHT: 70 IN | OXYGEN SATURATION: 99 % | RESPIRATION RATE: 18 BRPM

## 2017-09-15 VITALS — OXYGEN SATURATION: 99 % | DIASTOLIC BLOOD PRESSURE: 52 MMHG | SYSTOLIC BLOOD PRESSURE: 126 MMHG

## 2017-09-15 DIAGNOSIS — Z89.612 STATUS POST ABOVE KNEE AMPUTATION OF LEFT LOWER EXTREMITY: ICD-10-CM

## 2017-09-15 LAB
AMPHET UR QL SCN: NEGATIVE
BARBITURATES UR QL SCN: NEGATIVE
BENZODIAZ UR QL: NEGATIVE
CANNABINOIDS UR QL SCN: NEGATIVE
COCAINE UR QL SCN: NEGATIVE
HDSCOM,HDSCOM: NORMAL
METHADONE UR QL: NEGATIVE
OPIATES UR QL: NEGATIVE
PCP UR QL: NEGATIVE

## 2017-09-15 PROCEDURE — 76210000006 HC OR PH I REC 0.5 TO 1 HR: Performed by: SURGERY

## 2017-09-15 PROCEDURE — 77030031139 HC SUT VCRL2 J&J -A: Performed by: SURGERY

## 2017-09-15 PROCEDURE — 74011000250 HC RX REV CODE- 250

## 2017-09-15 PROCEDURE — 74011250636 HC RX REV CODE- 250/636

## 2017-09-15 PROCEDURE — 74011250637 HC RX REV CODE- 250/637: Performed by: NURSE ANESTHETIST, CERTIFIED REGISTERED

## 2017-09-15 PROCEDURE — 77030020782 HC GWN BAIR PAWS FLX 3M -B: Performed by: SURGERY

## 2017-09-15 PROCEDURE — 77030008031: Performed by: SURGERY

## 2017-09-15 PROCEDURE — 80307 DRUG TEST PRSMV CHEM ANLYZR: CPT

## 2017-09-15 PROCEDURE — 77030018836 HC SOL IRR NACL ICUM -A: Performed by: SURGERY

## 2017-09-15 PROCEDURE — 77030010509 HC AIRWY LMA MSK TELE -A: Performed by: ANESTHESIOLOGY

## 2017-09-15 PROCEDURE — 77030002996 HC SUT SLK J&J -A: Performed by: SURGERY

## 2017-09-15 PROCEDURE — 77030006835 HC BLD SAW SAG STRY -B: Performed by: SURGERY

## 2017-09-15 PROCEDURE — 76010000107 HC CV SURG FIRST 0.5 HR: Performed by: SURGERY

## 2017-09-15 PROCEDURE — 77030018673: Performed by: SURGERY

## 2017-09-15 PROCEDURE — 76210000020 HC REC RM PH II FIRST 0.5 HR: Performed by: SURGERY

## 2017-09-15 PROCEDURE — 74011250636 HC RX REV CODE- 250/636: Performed by: NURSE ANESTHETIST, CERTIFIED REGISTERED

## 2017-09-15 PROCEDURE — 77030008467 HC STPLR SKN COVD -B: Performed by: SURGERY

## 2017-09-15 PROCEDURE — 76060000031 HC ANESTHESIA FIRST 0.5 HR: Performed by: SURGERY

## 2017-09-15 PROCEDURE — 77030011640 HC PAD GRND REM COVD -A: Performed by: SURGERY

## 2017-09-15 RX ORDER — HYDROMORPHONE HYDROCHLORIDE 2 MG/ML
INJECTION, SOLUTION INTRAMUSCULAR; INTRAVENOUS; SUBCUTANEOUS
Status: COMPLETED
Start: 2017-09-15 | End: 2017-09-15

## 2017-09-15 RX ORDER — OXYCODONE AND ACETAMINOPHEN 5; 325 MG/1; MG/1
1 TABLET ORAL
Qty: 20 TAB | Refills: 0 | Status: SHIPPED | OUTPATIENT
Start: 2017-09-15 | End: 2017-10-04 | Stop reason: SDUPTHER

## 2017-09-15 RX ORDER — SODIUM CHLORIDE 0.9 % (FLUSH) 0.9 %
5-10 SYRINGE (ML) INJECTION EVERY 8 HOURS
Status: DISCONTINUED | OUTPATIENT
Start: 2017-09-15 | End: 2017-09-15 | Stop reason: HOSPADM

## 2017-09-15 RX ORDER — SODIUM CHLORIDE 0.9 % (FLUSH) 0.9 %
5-10 SYRINGE (ML) INJECTION AS NEEDED
Status: DISCONTINUED | OUTPATIENT
Start: 2017-09-15 | End: 2017-09-15 | Stop reason: HOSPADM

## 2017-09-15 RX ORDER — FENTANYL CITRATE 50 UG/ML
INJECTION, SOLUTION INTRAMUSCULAR; INTRAVENOUS AS NEEDED
Status: DISCONTINUED | OUTPATIENT
Start: 2017-09-15 | End: 2017-09-15 | Stop reason: HOSPADM

## 2017-09-15 RX ORDER — HYDROMORPHONE HYDROCHLORIDE 2 MG/ML
0.5 INJECTION, SOLUTION INTRAMUSCULAR; INTRAVENOUS; SUBCUTANEOUS
Status: DISCONTINUED | OUTPATIENT
Start: 2017-09-15 | End: 2017-09-15 | Stop reason: HOSPADM

## 2017-09-15 RX ORDER — SODIUM CHLORIDE, SODIUM LACTATE, POTASSIUM CHLORIDE, CALCIUM CHLORIDE 600; 310; 30; 20 MG/100ML; MG/100ML; MG/100ML; MG/100ML
75 INJECTION, SOLUTION INTRAVENOUS CONTINUOUS
Status: DISCONTINUED | OUTPATIENT
Start: 2017-09-15 | End: 2017-09-15 | Stop reason: HOSPADM

## 2017-09-15 RX ORDER — CEFAZOLIN SODIUM 2 G/50ML
2 SOLUTION INTRAVENOUS ONCE
Status: DISCONTINUED | OUTPATIENT
Start: 2017-09-15 | End: 2017-09-15 | Stop reason: HOSPADM

## 2017-09-15 RX ORDER — FENTANYL CITRATE 50 UG/ML
50 INJECTION, SOLUTION INTRAMUSCULAR; INTRAVENOUS
Status: DISCONTINUED | OUTPATIENT
Start: 2017-09-15 | End: 2017-09-15 | Stop reason: HOSPADM

## 2017-09-15 RX ORDER — EPHEDRINE SULFATE/0.9% NACL/PF 25 MG/5 ML
SYRINGE (ML) INTRAVENOUS AS NEEDED
Status: DISCONTINUED | OUTPATIENT
Start: 2017-09-15 | End: 2017-09-15 | Stop reason: HOSPADM

## 2017-09-15 RX ORDER — PROPOFOL 10 MG/ML
INJECTION, EMULSION INTRAVENOUS AS NEEDED
Status: DISCONTINUED | OUTPATIENT
Start: 2017-09-15 | End: 2017-09-15 | Stop reason: HOSPADM

## 2017-09-15 RX ORDER — MIDAZOLAM HYDROCHLORIDE 1 MG/ML
INJECTION, SOLUTION INTRAMUSCULAR; INTRAVENOUS AS NEEDED
Status: DISCONTINUED | OUTPATIENT
Start: 2017-09-15 | End: 2017-09-15 | Stop reason: HOSPADM

## 2017-09-15 RX ORDER — FAMOTIDINE 20 MG/1
20 TABLET, FILM COATED ORAL ONCE
Status: COMPLETED | OUTPATIENT
Start: 2017-09-15 | End: 2017-09-15

## 2017-09-15 RX ORDER — ONDANSETRON 2 MG/ML
4 INJECTION INTRAMUSCULAR; INTRAVENOUS AS NEEDED
Status: DISCONTINUED | OUTPATIENT
Start: 2017-09-15 | End: 2017-09-15 | Stop reason: HOSPADM

## 2017-09-15 RX ORDER — ONDANSETRON 2 MG/ML
INJECTION INTRAMUSCULAR; INTRAVENOUS AS NEEDED
Status: DISCONTINUED | OUTPATIENT
Start: 2017-09-15 | End: 2017-09-15 | Stop reason: HOSPADM

## 2017-09-15 RX ADMIN — FAMOTIDINE 20 MG: 20 TABLET ORAL at 14:25

## 2017-09-15 RX ADMIN — PROPOFOL 200 MG: 10 INJECTION, EMULSION INTRAVENOUS at 16:23

## 2017-09-15 RX ADMIN — FENTANYL CITRATE 25 MCG: 50 INJECTION, SOLUTION INTRAMUSCULAR; INTRAVENOUS at 16:34

## 2017-09-15 RX ADMIN — SODIUM CHLORIDE, SODIUM LACTATE, POTASSIUM CHLORIDE, AND CALCIUM CHLORIDE 75 ML/HR: 600; 310; 30; 20 INJECTION, SOLUTION INTRAVENOUS at 14:24

## 2017-09-15 RX ADMIN — ONDANSETRON 4 MG: 2 INJECTION INTRAMUSCULAR; INTRAVENOUS at 17:04

## 2017-09-15 RX ADMIN — HYDROMORPHONE HYDROCHLORIDE 0.5 MG: 2 INJECTION, SOLUTION INTRAMUSCULAR; INTRAVENOUS; SUBCUTANEOUS at 17:14

## 2017-09-15 RX ADMIN — MIDAZOLAM HYDROCHLORIDE 2 MG: 1 INJECTION, SOLUTION INTRAMUSCULAR; INTRAVENOUS at 16:17

## 2017-09-15 RX ADMIN — HYDROMORPHONE HYDROCHLORIDE 0.5 MG: 2 INJECTION, SOLUTION INTRAMUSCULAR; INTRAVENOUS; SUBCUTANEOUS at 17:04

## 2017-09-15 RX ADMIN — FENTANYL CITRATE 25 MCG: 50 INJECTION, SOLUTION INTRAMUSCULAR; INTRAVENOUS at 16:26

## 2017-09-15 RX ADMIN — FENTANYL CITRATE 25 MCG: 50 INJECTION, SOLUTION INTRAMUSCULAR; INTRAVENOUS at 16:23

## 2017-09-15 RX ADMIN — Medication 5 MG: at 16:25

## 2017-09-15 RX ADMIN — FENTANYL CITRATE 25 MCG: 50 INJECTION, SOLUTION INTRAMUSCULAR; INTRAVENOUS at 16:30

## 2017-09-15 RX ADMIN — ONDANSETRON 4 MG: 2 INJECTION INTRAMUSCULAR; INTRAVENOUS at 16:37

## 2017-09-15 NOTE — TELEPHONE ENCOUNTER
Olman Mcgrath called regarding patient Hgb and Hct, platelets. Component      Latest Ref Rng & Units 9/7/2017 9/7/2017 9/7/2017           6:15 AM  6:15 AM  6:15 AM   HGB      13.0 - 16.0 g/dL   9.4 (L)   HCT      36.0 - 48.0 %   28.8 (L)   Prothrombin time      11.5 - 15.2 sec 18.3 (H)     INR      0.8 - 1.2   1.6 (H)     PLATELET      567 - 271 K/uL  510 (H)       Patient has been schedule with Dr. Rubi Scott 09/19/2017 at 11:45 am.  Patient has been having rectal bleeding , nose bleeds. Coumadin as been discontinued per Dr. Sarah De La Rosa. See Cardiac encounter.

## 2017-09-15 NOTE — ANESTHESIA PREPROCEDURE EVALUATION
Anesthetic History   No history of anesthetic complications            Review of Systems / Medical History  Patient summary reviewed and pertinent labs reviewed    Pulmonary  Within defined limits                 Neuro/Psych   Within defined limits           Cardiovascular  Within defined limits          Dysrhythmias   CAD and PAD    Exercise tolerance: <4 METS     GI/Hepatic/Renal  Within defined limits              Endo/Other  Within defined limits    Hypothyroidism  Arthritis     Other Findings   Comments:   Risk Factors for Postoperative nausea/vomiting:       History of postoperative nausea/vomiting? NO       Female? NO       Motion sickness? NO       Intended opioid administration for postoperative analgesia? YES      Smoking Abstinence  Current Smoker? NO  Elective Surgery? YES  Seen preoperatively by anesthesiologist or proxy prior to day of surgery? YES  Pt abstained from smoking 24 hours prior to anesthesia?  YES               Physical Exam    Airway  Mallampati: III  TM Distance: 4 - 6 cm  Neck ROM: normal range of motion   Mouth opening: Normal     Cardiovascular    Rhythm: regular  Rate: normal         Dental    Dentition: Edentulous     Pulmonary  Breath sounds clear to auscultation               Abdominal  GI exam deferred       Other Findings            Anesthetic Plan    ASA: 3  Anesthesia type: general          Induction: Intravenous  Anesthetic plan and risks discussed with: Patient

## 2017-09-15 NOTE — TELEPHONE ENCOUNTER
I spoke with patient's daughter Guy Torres to inform her of Coumadin instructions post procedure scheduled for today. She informed me during the call that patient had bright red blood in stool yesterday, coughed up blood clots and nose bleed on Wednesday. Patient also fell on Wednesday. I discussed with Dr. Hilary Buckley. Verbal order and read back per Noel Powers MD patient to hold Coumadin at this time and follow up with PCP. I spoke with Lakshmi Busch at Dr. Analy Ochoa office. Lakshmi Busch reviewed most recent labs in [address] [place] [address] Rd. She stated patient is scheduled for follow up on Tuesday at 1145 am with Dr. Ashok Hillman. She was informed of the above incidents with bleeding. I attempted to reach the patient's daughter to instruct her to hold the Coumadin, however she did not answer and her mail box is full. I will try again later.

## 2017-09-15 NOTE — ANESTHESIA POSTPROCEDURE EVALUATION
Post-Anesthesia Evaluation and Assessment    Patient: Jory Chapman MRN: [id number]  SSN: [id number]    YOB: 1960  Age: 59 y.o. Sex: male       Cardiovascular Function/Vital Signs  Visit Vitals    /65    Pulse (!) 58    Temp 36.7 °C (98.1 °F)    Resp 19    Ht 5' 10\" (1.778 m)    Wt 63.5 kg (140 lb)    SpO2 100%    BMI 20.09 kg/m2       Patient is status post general anesthesia for Procedure(s):  LEFT KNEE AMPUTATION WOUND [address] OUT. Nausea/Vomiting: None    Postoperative hydration reviewed and adequate. Pain:  Pain Scale 1: Numeric (0 - 10) (09/15/17 1656)  Pain Intensity 1: 6 (09/15/17 1656)   Managed    Neurological Status:   Neuro (WDL): Within Defined Limits (09/15/17 1656)   At baseline    Mental Status and Level of Consciousness: Arousable    Pulmonary Status:   O2 Device: Oxygen mask (09/15/17 1656)   Adequate oxygenation and airway patent    Complications related to anesthesia: None    Post-anesthesia assessment completed.  No concerns    Signed By: Kia Roberson MD     September 15, 2017

## 2017-09-15 NOTE — H&P
Surgery History and Physical    Subjective: Radha Segovia is a 59 y.o. male who presents with fall on left AKA site and hematoma from traumatic fall.     Patient Active Problem List    Diagnosis Date Noted    Skin ulcer of malleolar area of right ankle ([address]     Prolonged Q-T interval on ECG 08/19/2017    Adverse effect of amiodarone 08/19/2017    Status post above knee amputation of left lower extremity ([address] 08/18/2017    Aftercare for amputation stump 08/18/2017    Ischemic rest pain of lower extremity ([address] 08/14/2017    Chronic ulcer of right heel ([address] 08/08/2017    Chronic anemia     Acute blood loss as cause of postoperative anemia 07/25/2017    Aftercare following surgery of the circulatory system 07/25/2017    Impaired mobility and ADLs 07/24/2017    Infection of vascular bypass graft ([address] 07/24/2017    Moderate to severe pulmonary hypertension ([address] 07/23/2017    Pseudoaneurysm of femoral artery ([address] 06/27/2017    History of vitamin D deficiency 04/22/2017    Status post femoral-popliteal bypass surgery 04/21/2017    Anticoagulated on Coumadin     Ischemic cardiomyopathy     Chronic systolic heart failure (HCC)     Carotid artery disease ([address]     Dyslipidemia     History of cardioversion 04/18/2017    Paroxysmal atrial fibrillation ([address]     Critical ischemia of lower extremity 04/10/2017    Euthyroid sick syndrome 04/06/2017    Coronary artery disease involving native coronary artery of native heart 03/15/2017    Aortoiliac occlusive disease ([address] 01/25/2017    Atherosclerosis of native artery of both lower extremities with intermittent claudication ([address] 01/25/2017    Peripheral artery disease ([address] 01/25/2017    Hereditary peripheral neuropathy 11/15/2016    Erectile dysfunction 07/05/2016    Spinal stenosis of lumbar region with radiculopathy 05/04/2015    Benign hypertensive heart disease with systolic congestive heart failure, NYHA class 2 ([address] 01/26/2015  DDD (degenerative disc disease), lumbar 01/26/2015     Past Medical History:   Diagnosis Date    Acute blood loss as cause of postoperative anemia 4/4/2017    Anticoagulated on Coumadin     Aortoiliac occlusive disease ([address] 1/25/2017    Atherosclerosis of native artery of both lower extremities with intermittent claudication ([address] 1/25/2017    Benign hypertensive heart disease with systolic congestive heart failure, NYHA class 2 ([address] 1/26/2015    Carotid artery disease (HCC)     Chronic anemia     Chronic systolic heart failure (HCC)     Chronic ulcer of right foot ([address] 4/4/2017    Chronic ulcer of right heel ([address] 8/8/2017    Coronary artery disease involving native coronary artery of native heart 3/15/2017    Successful stenting of Cx (Xience LESLEY) and RCA (Xience LESLEY) to 0% by Dr. Lizeth Pinto on 3/15/17.     DDD (degenerative disc disease), lumbar 1/26/2015    Dyslipidemia     Erectile dysfunction 7/5/2016    Euthyroid sick syndrome 4/6/2017    Hereditary peripheral neuropathy 11/15/2016    History of cardioversion 4/18/2017    S/P Synchronized external cardioversion (4/18/2017 - Dr. Shiraz Ramey)    History of vitamin D deficiency 4/22/2017    Vitamin D 25-Hydroxy (4/22/2017) = 12.0; Vitamin D 25-Hydroxy (5/26/2017) = 38.7    Infection of vascular bypass graft ([address] 7/24/2017    Left lower extremity    Ischemic cardiomyopathy     Moderate to severe pulmonary hypertension ([address] 7/23/2017    Paroxysmal atrial fibrillation (HCC)     Peripheral artery disease ([address] 1/25/2017    Skin ulcer of malleolar area of right ankle ([address]     Spinal stenosis of lumbar region with radiculopathy 5/4/2015    Dr. Callie Matthews      Past Surgical History:   Procedure Laterality Date    CARDIAC CATHETERIZATION  3/8/2017         CARDIAC CATHETERIZATION  3/15/2017         CORONARY STENT SINGLE W/PTCA  3/15/2017         HX ABOVE KNEE AMPUTATION Left 08/18/2017    S/P Left above-the-knee amputation (8/18/2017 - Dr. Malu Chahal)    HX ATHERECTOMY Left 06/15/2017    S/P Atherectomy and balloon angioplasty of the left superficial femoral artery and above-knee popliteal artery (6/15/2017 - Dr. Malu Chahal)    HX ATHERECTOMY Right 09/07/2017    S/P Atherectomy and balloon angioplasty of the below-the-knee popliteal artery, above-the-knee popliteal artery, tibioperoneal trunk artery and posterior tibial artery (9/7/2017 - Dr. Malu Chahal)    HX COLONOSCOPY  07/23/2015    polyps repeat 2020 5 years Haresh Henderson [address] Right 04/04/2017    S/P Right axillary to bifemoral artery bypass using an 8 mm Propaten graft from the right axilla to the right common femoral artery with a jump femoral-femoral bypass with another 8 mm Propaten external ring bypass; Right common femoral artery, and profunda femoris artery and superficial femoral artery endarterectomy causing an extensive surgery on the right side (4/4/2017 - Dr. Isabelle Mcdonough 94 Right 04/07/2017    S/P Cutdown of femoral-femoral bypass, more on the left groin side; Right lower extremity angiography with first-order catheterization (4/7/2017 - Dr. Malu Chahal)    HX FEMORAL BYPASS Right 04/10/2017    S/P Right femoral to above-knee popliteal bypass with an 8 mm PTFE graft (4/10/2017 - Dr. Ivanna Chapin)    HX OTHER SURGICAL Left 07/25/2017    S/P Removal of infected graft; Repair of left superficial femoral artery; Repair of left common femoral artery (7/25/2017 - Dr. Malu Chahal)    HX OTHER SURGICAL Right 06/27/2017    S/P Drainage of right side abscess (6/27/2017 - Dr. Malu Chahal)    HX OTHER SURGICAL Left 07/01/2017    S/P Left groin exploration; Left femoral repair pseudoaneurysm; Left wound washout;  Wound VAC placement (7/01/2017 - Dr. Malu Chahal)    HX OTHER SURGICAL Left 07/04/2017    S/P Left common femoral artery ligation; Jump bypass from right to left fem-fem to a more distal superficial femoral artery using 8 mm external ringed Propaten graft; Left groin wound exploration and washout (7/4/2017 - Dr. Willow Wong)    HX OTHER SURGICAL Right 08/18/2017    S/P Right axillary femoral jump bypass interposition; Removal of infected right axillary femoral bypass; Wound VAC placement of upper thigh 1.2 cm x 5.2 cm x 1.8 cm and groin 4 cm x 0.5 cm x 0.2 cm (8/18/2017 - Dr. Willow Wong)      Social History   Substance Use Topics    Smoking status: Former Smoker    Smokeless tobacco: Never Used      Comment: quit in 2011    Alcohol use 1.2 oz/week     2 Cans of beer per week      Comment: 10 cans of beer a month      Family History   Problem Relation Age of Onset    Heart Disease Father       Prior to Admission medications    Medication Sig Start Date End Date Taking? Authorizing Provider   clopidogrel (PLAVIX) 75 mg tab Take 75 mg by mouth daily. 9/11/17  Yes Elliott Montiel MD   furosemide (LASIX) 20 mg tablet 1 tablet daily  Indications: Pulmonary Edema due to Chronic Heart Failure 9/8/17  Yes Genie Negron MD   gabapentin (NEURONTIN) 300 mg capsule Take 1 Cap by mouth every eight (8) hours. Indications: NEUROPATHIC PAIN 9/8/17  Yes Genie Negron MD   magnesium oxide (MAG-OX) 400 mg tablet Take 1 Tab by mouth daily. 9/8/17  Yes Genie Negron MD   losartan (COZAAR) 25 mg tablet Take 2 Tabs by mouth daily. Indications: Chronic Heart Failure, hypertension 9/7/17  Yes Genie Negron MD   oxyCODONE-acetaminophen (PERCOCET) 5-325 mg per tablet Take 1 Tab by mouth every four (4) hours as needed (for moderate to severe pain). Max Daily Amount: 6 Tabs. Indications: Pain 9/7/17  Yes Genie Negron MD   ampicillin-sulbactam 3 gram 3 g IVPB 3 g by IntraVENous route every six (6) hours for 11 days. [STOP DATE: 9/19/2017] 9/7/17 9/18/17 Yes Genie Negron MD   potassium chloride (KLOR-CON) 20 mEq packet Take 1 Packet by mouth daily.  [while on Furosemide]  Indications: hypokalemia prevention 17  Yes Cherelle Dukes MD   metoprolol tartrate (LOPRESSOR) 25 mg tablet Take 1 Tab by mouth every twelve (12) hours. Indications: hypertension, VENTRICULAR RATE CONTROL IN ATRIAL FIBRILLATION 17  Yes Lala Squires MD   amiodarone (CORDARONE) 200 mg tablet Take 1 Tab by mouth daily. Indications: VENTRICULAR RATE CONTROL IN ATRIAL FIBRILLATION 17  Yes Hafsa Mccoy MD   aspirin 81 mg chewable tablet Take 1 Tab by mouth daily (with breakfast). 17  Yes Kun Bowers MD   cholecalciferol (VITAMIN D3) 1,000 unit tablet Take 1 Tab by mouth daily. 17  Yes Kun Bowers MD   ascorbic acid, vitamin C, (VITAMIN C) 250 mg tablet Take 1 Tab by mouth daily (with breakfast). 17  Yes Kun Bowers MD   zinc sulfate (ZINCATE) 220 (50) mg capsule Take 1 Cap by mouth daily. 17  Yes Kun Bowers MD   ferrous sulfate 325 mg (65 mg iron) tablet Take 1 Tab by mouth daily (with breakfast). 17  Yes Kun Bowers MD   atorvastatin (LIPITOR) 40 mg tablet Take 1 Tab by mouth nightly. Indications: DYSLIPIDEMIA 17  Yes Kun Bowers MD   cyanocobalamin (VITAMIN B-12) 1,000 mcg tablet Take 1 Tab by mouth daily. 10/6/16  Yes Kun Bowers MD   warfarin (COUMADIN) 5 mg tablet Take 1 Tab by mouth nightly. [Dose to be adjusted by Cardiology with a target INR of 2 to 3]  Patient taking differently: Take 5 mg by mouth six (6) days a week. On hold until further testing after seen by PCP 17   Cherelle Dukes MD     Allergies   Allergen Reactions    Morphine Other (comments)     Patient gets confused with morphine. ROS:  Pertinent items are noted in HPI. Unless otherwise mentioned in the HPI.     Objective:     Patient Vitals for the past 8 hrs:   BP Temp Pulse Resp SpO2 Height Weight   09/15/17 1402 151/69 98.1 °F (36.7 °C) 63 18 97 % 5' 10\" (1.778 m) 140 lb (63.5 kg)       Temp (24hrs), Av.1 °F (36.7 °C), Min:98.1 °F (36.7 °C), Max:98.1 °F (36.7 °C)      Physical Exam:  GENERAL: alert, cooperative, no distress, appears stated age, THROAT & NECK: normal and no erythema or exudates noted. , LUNG: clear to auscultation bilaterally, HEART: regular rate and rhythm, S1, S2 normal, no murmur, click, rub or gallop, ABDOMEN: soft, non-tender. Bowel sounds normal. No masses,  no organomegaly    Labs:   Recent Results (from the past 24 hour(s))   DRUG SCREEN, URINE    Collection Time: 09/15/17  1:56 PM   Result Value Ref Range    BENZODIAZEPINE NEGATIVE  NEG      BARBITURATES NEGATIVE  NEG      THC (TH-CANNABINOL) NEGATIVE  NEG      OPIATES NEGATIVE  NEG      PCP(PHENCYCLIDINE) NEGATIVE  NEG      COCAINE NEGATIVE  NEG      AMPHETAMINES NEGATIVE  NEG      METHADONE NEGATIVE  NEG      HDSCOM (NOTE)        Data Review:    CBC:   Lab Results   Component Value Date/Time    WBC 10.7 09/03/2017 03:45 AM    RBC 2.95 09/03/2017 03:45 AM    HGB 9.4 09/07/2017 06:15 AM    HCT 28.8 09/07/2017 06:15 AM    PLATELET 433 28/60/4398 06:15 AM      BMP:   Lab Results   Component Value Date/Time    Glucose 84 09/04/2017 07:26 AM    Sodium 134 09/04/2017 07:26 AM    Potassium 4.3 09/04/2017 07:26 AM    Chloride 99 09/04/2017 07:26 AM    CO2 27 09/04/2017 07:26 AM    BUN 19 09/04/2017 07:26 AM    Creatinine 0.73 09/04/2017 07:26 AM    Calcium 9.0 09/04/2017 07:26 AM     Coagulation:   Lab Results   Component Value Date/Time    Prothrombin time 16.7 09/08/2017 06:13 AM    INR 1.4 09/08/2017 06:13 AM    INR, External 2.7 09/14/2017    INR POC 2.7 09/14/2017 12:22 PM    aPTT 40.7 08/19/2017 06:00 AM       Assessment:     Active Problems:    * No active hospital problems. *      Plan:     Left AKA wound washout.     Signed By: Sade Hart MD     September 15, 2017

## 2017-09-15 NOTE — DISCHARGE INSTRUCTIONS
DISCHARGE SUMMARY from Nurse    The following personal items are in your possession at time of discharge:    Dental Appliances: Uppers, Lowers  Visual Aid: Glasses     Home Medications: None  Jewelry: None  Clothing: Shirt, Footwear, Undergarments, Pants, Socks  Other Valuables: None             PATIENT INSTRUCTIONS:    After general anesthesia or intravenous sedation, for 24 hours or while taking prescription Narcotics:  · Limit your activities  · Do not drive and operate hazardous machinery  · Do not make important personal or business decisions  · Do  not drink alcoholic beverages  · If you have not urinated within 8 hours after discharge, please contact your surgeon on call. Report the following to your surgeon:  · Excessive pain, swelling, redness or odor of or around the surgical area  · Temperature over 100.5  · Nausea and vomiting lasting longer than 4 hours or if unable to take medications  · Any signs of decreased circulation or nerve impairment to extremity: change in color, persistent  numbness, tingling, coldness or increase pain  · Any questions        These are general instructions for a healthy lifestyle:    No smoking/ No tobacco products/ Avoid exposure to second hand smoke    Surgeon General's Warning:  Quitting smoking now greatly reduces serious risk to your health. Obesity, smoking, and sedentary lifestyle greatly increases your risk for illness    A healthy diet, regular physical exercise & weight monitoring are important for maintaining a healthy lifestyle    You may be retaining fluid if you have a history of heart failure or if you experience any of the following symptoms:  Weight gain of 3 pounds or more overnight or 5 pounds in a week, increased swelling in our hands or feet or shortness of breath while lying flat in bed. Please call your doctor as soon as you notice any of these symptoms; do not wait until your next office visit.     Recognize signs and symptoms of STROKE:    F-face looks uneven    A-arms unable to move or move unevenly    S-speech slurred or non-existent    T-time-call 911 as soon as signs and symptoms begin-DO NOT go       Back to bed or wait to see if you get better-TIME IS BRAIN. Warning Signs of HEART ATTACK     Call 911 if you have these symptoms:   Chest discomfort. Most heart attacks involve discomfort in the center of the chest that lasts more than a few minutes, or that goes away and comes back. It can feel like uncomfortable pressure, squeezing, fullness, or pain.  Discomfort in other areas of the upper body. Symptoms can include pain or discomfort in one or both arms, the back, neck, jaw, or stomach.  Shortness of breath with or without chest discomfort.  Other signs may include breaking out in a cold sweat, nausea, or lightheadedness. Don't wait more than five minutes to call 911 - MINUTES MATTER! Fast action can save your life. Calling 911 is almost always the fastest way to get lifesaving treatment. Emergency Medical Services staff can begin treatment when they arrive -- up to an hour sooner than if someone gets to the hospital by car. The discharge information has been reviewed with the patient and caregiver. The patient and caregiver verbalized understanding. Discharge medications reviewed with the patient and caregiver and appropriate educational materials and side effects teaching were provided. Narcotic-Analgesic/Acetaminophen (Percocet, Norco, Lorcet HD, Lortab 10/325) - (By mouth)   Why this medicine is used:   Relieves pain.   Contact a nurse or doctor right away if you have:  · Extreme weakness, shallow breathing, slow heartbeat  · Severe confusion, lightheadedness, dizziness, fainting  · Yellow skin or eyes, dark urine or pale stools  · Severe constipation, severe stomach pain, nausea, vomiting, loss of appetite  · Sweating or cold, clammy skin     Common side effects:  · Mild constipation, nausea, vomiting  · Sleepiness, tiredness  · Itching, rash  © 2017 [facility] Information is for End User's use only and may not be sold, redistributed or otherwise used for commercial purposes.

## 2017-09-15 NOTE — PROGRESS NOTES
Sw received a call from [facility] who were confirming the end date of pt's home IV's. Dary shared that dc summary and med rec reflect 9/18. Aura from [facility] states she will call Arsh Saucedo MD to confirm.

## 2017-09-15 NOTE — ANESTHESIA POSTPROCEDURE EVALUATION
Post-Anesthesia Evaluation and Assessment    Patient: Yarelis Nogueira MRN: [id number]  SSN: [id number]    YOB: 1960  Age: 59 y.o. Sex: male       Cardiovascular Function/Vital Signs  Visit Vitals    /65    Pulse (!) 58    Temp 36.7 °C (98.1 °F)    Resp 19    Ht 5' 10\" (1.778 m)    Wt 63.5 kg (140 lb)    SpO2 100%    BMI 20.09 kg/m2       Patient is status post general anesthesia for Procedure(s):  LEFT KNEE AMPUTATION WOUND [address] OUT. Nausea/Vomiting: None    Postoperative hydration reviewed and adequate. Pain:  Pain Scale 1: Numeric (0 - 10) (09/15/17 1656)  Pain Intensity 1: 6 (09/15/17 1656)   Managed    Neurological Status:   Neuro (WDL): Within Defined Limits (09/15/17 1656)   At baseline    Mental Status and Level of Consciousness: Arousable    Pulmonary Status:   O2 Device: Oxygen mask (09/15/17 1656)   Adequate oxygenation and airway patent    Complications related to anesthesia: None    Post-anesthesia assessment completed.  No concerns    Signed By: Kp Dominguez MD     September 15, 2017

## 2017-09-15 NOTE — BRIEF OP NOTE
BRIEF OPERATIVE NOTE    Date of Procedure: 9/15/2017   Preoperative Diagnosis: Dehiscence of amputation stump ([facility] [T87.81]  Postoperative Diagnosis: Dehiscence of amputation stump ([facility] [T87.81]    Procedure(s):  LEFT KNEE AMPUTATION WOUND [address] OUT/WOUND VAC PLACEMENT  Surgeon(s) and Role:     * Mauro Vaz MD - Primary         Assistant Staff:       Surgical Staff:  Circ-1: Werner Lowe RN  Scrub Tech-1: Charly Vo  Scrub Tech-2: Ross Santos.  Knapp  Surg Asst-1: Esther Lombard  Event Time In   Incision Start 1631   Incision Close 1640     Anesthesia: General   Estimated Blood Loss: 50mL  Specimens: * No specimens in log *   Findings: large hematoma with loss of fascial sutures   Complications: none  Implants: * No implants in log *

## 2017-09-15 NOTE — IP AVS SNAPSHOT
[address] 26[address] [facility] Patient: Leopoldo Net MRN: [id number] JMD:4/[id number][id number] You are allergic to the following Allergen Reactions Morphine Other (comments) Patient gets confused with morphine. Recent Documentation Height Weight BMI Smoking Status 1.778 m 63.5 kg 20.09 kg/m2 Former Smoker Emergency Contacts Name Discharge Info Relation Home Work Mobile Marek Caroina DISCHARGE CAREGIVER [3] Daughter [21] [phone] Marek WHITTLynnette  Child [2] [phone] About your hospitalization You were admitted on:  September 15, 2017 You last received care in the:  [facility] PACU You were discharged on:  September 15, 2017 Unit phone number:  [phone] Why you were hospitalized Your primary diagnosis was:  Not on File Providers Seen During Your Hospitalizations Provider Role Specialty Primary office phone Tabatha Bear MD Attending Provider Vascular Surgery [phone] Your Primary Care Physician (PCP) Primary Care Physician Office Phone Office Fax Moy Daniel [phone] Follow-up Information Follow up With Details Comments Contact Info Clifton Gil, [address] [facility] [address] 200 [facility] 
[phone] Tabatha Bear MD Follow up in 1 week(s)  [address] 
BS VEIN [facility] VASCULAR  [facility] 
[phone] Your Appointments Saturday September 16, 2017 To Be Determined ROUTINE with BEAU Lin [facility] HOME CARE SCHEDULING/INTAKE ([facility]) 325 [facility] CARE SCHEDULING/INTAKE ([facility]/ HOSPICE) Sunday September 17, 2017 To Be Determined ROUTINE with Dannielle Yan 325 [facility] CARE SCHEDULING/INTAKE ([facility]) [address] CARE SCHEDULING/INTAKE ([facility]) Monday September 18, 2017 To Be Determined ROUTINE with BEAU [address] CARE SCHEDULING/INTAKE ([facility]) [address] CARE SCHEDULING/INTAKE ([facility]ICE) Monday September 18, 2017 10:00 AM EDT  
PTA HOME VISIT with TIMOTHY [address] CARE SCHEDULING/INTAKE ([facility]) [address] CARE SCHEDULING/INTAKE ([facility]) Tuesday September 19, 2017 11:45 AM EDT  
POST HOSPITAL with Yelena Figueroa MD  
20[address] [facility] ([facility]) [address] [place] [address] [address] 200 [facility]  
[phone] Wednesday September 20, 2017 To Be Determined ROUTINE with BEAU [address] CARE SCHEDULING/INTAKE ([facility]) [address] CARE SCHEDULING/INTAKE ([facility]) Thursday September 21, 2017 To Be Determined PTA HOME VISIT with TIMOTHY Pérez [facility] HOME CARE SCHEDULING/INTAKE ([facility] HOSPICE) [address] CARE SCHEDULING/INTAKE ([facility]) Thursday September 21, 2017 To Be Determined ROUTINE with BEAU [address] CARE SCHEDULING/INTAKE ([facility]) [address] CARE SCHEDULING/INTAKE ([facility]) Thursday September 21, 2017  9:00 AM EDT HOSPITAL DISCHARGE with [address] [place], [place] Brett [facility] ([facility]) [address] [place] [address] 972 200 [facility]  
[phone] Friday September 22, 2017 To Be Determined ROUTINE with Tai Miranda RN  
 [address] CARE SCHEDULING/INTAKE ([facility]) [address] CARE SCHEDULING/INTAKE ([facility]) Saturday September 23, 2017 To Be Determined ROUTINE with BEAU [address] CARE SCHEDULING/INTAKE ([facility]) [address] CARE SCHEDULING/INTAKE ([facility]) Monday September 25, 2017 To Be Determined ROUTINE with BEAU [address] CARE SCHEDULING/INTAKE ([facility]) [address] CARE SCHEDULING/INTAKE ([facility]) Monday September 25, 2017 To Be Determined PTA HOME VISIT with TIMOTHY Martinez [facility] HOME CARE SCHEDULING/INTAKE ([facility]) [address] CARE SCHEDULING/INTAKE ([facility]) Wednesday September 27, 2017 To Be Determined ROUTINE with BEAU [address] CARE SCHEDULING/INTAKE ([facility]) [address] CARE SCHEDULING/INTAKE ([facility]) Wednesday September 27, 2017 To Be Determined PTA HOME VISIT with TIMOTHY Martinez [facility] HOME CARE SCHEDULING/INTAKE ([facility]) [address] CARE SCHEDULING/INTAKE ([facility]) Wednesday September 27, 2017 11:15 AM EDT HOSPITAL DISCHARGE with [address] [place], [place] Brett [facility] ([facility]) [address] [place] [address]  
[phone] Friday September 29, 2017 To Be Determined SN REASSESSMENT with BEAU [address] CARE SCHEDULING/INTAKE ([facility]) [facility] SCHEDULING/INTAKE ([facility]) Thursday October 05, 2017 10:40 AM EDT Follow Up with Law Brown MD  
[facility] ([facility]) Priyanka Samayoa [phone]  
[phone] Current Discharge Medication List  
  
CONTINUE these medications which have CHANGED Dose & Instructions Dispensing Information Comments Morning Noon Evening Bedtime  
 warfarin 5 mg tablet Commonly known as:  COUMADIN What changed:   
- when to take this 
- additional instructions Your last dose was: Your next dose is:    
   
   
 Dose:  5 mg Take 1 Tab by mouth nightly. [Dose to be adjusted by Cardiology with a target INR of 2 to 3] Quantity:  15 Tab Refills:  0 CONTINUE these medications which have NOT CHANGED Dose & Instructions Dispensing Information Comments Morning Noon Evening Bedtime  
 amiodarone 200 mg tablet Commonly known as:  CORDARONE Your last dose was: Your next dose is:    
   
   
 Dose:  200 mg Take 1 Tab by mouth daily. Indications: VENTRICULAR RATE CONTROL IN ATRIAL FIBRILLATION Quantity:  60 Tab Refills:  0  
     
   
   
   
  
 ampicillin-sulbactam 3 gram 3 g IVPB Your last dose was: Your next dose is:    
   
   
 Dose:  3 g  
3 g by IntraVENous route every six (6) hours for 11 days. [STOP DATE: 9/19/2017] Quantity:  44 Dose Refills:  0  
     
   
   
   
  
 ascorbic acid (vitamin C) 250 mg tablet Commonly known as:  VITAMIN C Your last dose was: Your next dose is:    
   
   
 Dose:  250 mg Take 1 Tab by mouth daily (with breakfast). Quantity:  90 Tab Refills:  2  
     
   
   
   
  
 aspirin 81 mg chewable tablet Your last dose was:     
   
Your next dose is:    
   
   
 Dose:  81 mg  
 Take 1 Tab by mouth daily (with breakfast). Quantity:  90 Tab Refills:  3  
     
   
   
   
  
 atorvastatin 40 mg tablet Commonly known as:  LIPITOR Your last dose was: Your next dose is:    
   
   
 Dose:  40 mg Take 1 Tab by mouth nightly. Indications: DYSLIPIDEMIA Quantity:  90 Tab Refills:  3 cholecalciferol 1,000 unit tablet Commonly known as:  VITAMIN D3 Your last dose was: Your next dose is:    
   
   
 Dose:  1000 Units Take 1 Tab by mouth daily. Quantity:  90 Tab Refills:  3  
     
   
   
   
  
 cyanocobalamin 1,000 mcg tablet Commonly known as:  VITAMIN B-12 Your last dose was: Your next dose is:    
   
   
 Dose:  1000 mcg Take 1 Tab by mouth daily. Quantity:  90 Tab Refills:  3  
     
   
   
   
  
 ferrous sulfate 325 mg (65 mg iron) tablet Your last dose was: Your next dose is:    
   
   
 Dose:  325 mg Take 1 Tab by mouth daily (with breakfast). Quantity:  90 Tab Refills:  3  
     
   
   
   
  
 furosemide 20 mg tablet Commonly known as:  LASIX Your last dose was: Your next dose is:    
   
   
 1 tablet daily  Indications: Pulmonary Edema due to Chronic Heart Failure Quantity:  15 Tab Refills:  0  
     
   
   
   
  
 gabapentin 300 mg capsule Commonly known as:  NEURONTIN Your last dose was: Your next dose is:    
   
   
 Dose:  300 mg Take 1 Cap by mouth every eight (8) hours. Indications: NEUROPATHIC PAIN Quantity:  45 Cap Refills:  0  
     
   
   
   
  
 losartan 25 mg tablet Commonly known as:  COZAAR Your last dose was: Your next dose is:    
   
   
 Dose:  50 mg Take 2 Tabs by mouth daily. Indications: Chronic Heart Failure, hypertension Quantity:  15 Tab Refills:  0  
     
   
   
   
  
 magnesium oxide 400 mg tablet Commonly known as:  MAG-OX Your last dose was: Your next dose is:    
   
   
 Dose:  400 mg Take 1 Tab by mouth daily. Quantity:  15 Tab Refills:  0  
     
   
   
   
  
 metoprolol tartrate 25 mg tablet Commonly known as:  LOPRESSOR Your last dose was: Your next dose is:    
   
   
 Dose:  25 mg Take 1 Tab by mouth every twelve (12) hours. Indications: hypertension, VENTRICULAR RATE CONTROL IN ATRIAL FIBRILLATION Quantity:  30 Tab Refills:  6  
     
   
   
   
  
 oxyCODONE-acetaminophen 5-325 mg per tablet Commonly known as:  PERCOCET Your last dose was: Your next dose is:    
   
   
 Dose:  1 Tab Take 1 Tab by mouth every four (4) hours as needed (for moderate to severe pain). Max Daily Amount: 6 Tabs. Indications: Pain Quantity:  20 Tab Refills:  0 PLAVIX 75 mg Tab Generic drug:  clopidogrel Your last dose was: Your next dose is:    
   
   
 Dose:  75 mg Take 75 mg by mouth daily. Refills:  0  
     
   
   
   
  
 potassium chloride 20 mEq packet Commonly known as:  KLOR-CON Your last dose was: Your next dose is:    
   
   
 Dose:  20 mEq Take 1 Packet by mouth daily. [while on Furosemide]  Indications: hypokalemia prevention Quantity:  15 Packet Refills:  0  
     
   
   
   
  
 zinc sulfate 220 (50) mg capsule Commonly known as:  ZINCATE Your last dose was: Your next dose is:    
   
   
 Dose:  220 mg Take 1 Cap by mouth daily. Quantity:  90 Cap Refills:  0 Where to Get Your Medications Information on where to get these meds will be given to you by the nurse or doctor. ! Ask your nurse or doctor about these medications  
  oxyCODONE-acetaminophen 5-325 mg per tablet Discharge Instructions DISCHARGE SUMMARY from Nurse The following personal items are in your possession at time of discharge: 
 
Dental Appliances: Uppers, Lowers Visual Aid: Glasses Home Medications: None Jewelry: None Clothing: Shirt, Footwear, Undergarments, Pants, Socks Other Valuables: None PATIENT INSTRUCTIONS: 
 
After general anesthesia or intravenous sedation, for 24 hours or while taking prescription Narcotics: · Limit your activities · Do not drive and operate hazardous machinery · Do not make important personal or business decisions · Do  not drink alcoholic beverages · If you have not urinated within 8 hours after discharge, please contact your surgeon on call. Report the following to your surgeon: 
· Excessive pain, swelling, redness or odor of or around the surgical area · Temperature over 100.5 · Nausea and vomiting lasting longer than 4 hours or if unable to take medications · Any signs of decreased circulation or nerve impairment to extremity: change in color, persistent  numbness, tingling, coldness or increase pain · Any questions These are general instructions for a healthy lifestyle: No smoking/ No tobacco products/ Avoid exposure to second hand smoke Surgeon General's Warning:  Quitting smoking now greatly reduces serious risk to your health. Obesity, smoking, and sedentary lifestyle greatly increases your risk for illness A healthy diet, regular physical exercise & weight monitoring are important for maintaining a healthy lifestyle You may be retaining fluid if you have a history of heart failure or if you experience any of the following symptoms:  Weight gain of 3 pounds or more overnight or 5 pounds in a week, increased swelling in our hands or feet or shortness of breath while lying flat in bed. Please call your doctor as soon as you notice any of these symptoms; do not wait until your next office visit. Recognize signs and symptoms of STROKE: 
 
F-face looks uneven A-arms unable to move or move unevenly S-speech slurred or non-existent T-time-call 911 as soon as signs and symptoms begin-DO NOT go Back to bed or wait to see if you get better-TIME IS BRAIN. Warning Signs of HEART ATTACK Call 911 if you have these symptoms: 
? Chest discomfort. Most heart attacks involve discomfort in the center of the chest that lasts more than a few minutes, or that goes away and comes back. It can feel like uncomfortable pressure, squeezing, fullness, or pain. ? Discomfort in other areas of the upper body. Symptoms can include pain or discomfort in one or both arms, the back, neck, jaw, or stomach. ? Shortness of breath with or without chest discomfort. ? Other signs may include breaking out in a cold sweat, nausea, or lightheadedness. Don't wait more than five minutes to call [address]! Fast action can save your life. Calling 911 is almost always the fastest way to get lifesaving treatment. Emergency Medical Services staff can begin treatment when they arrive  up to an hour sooner than if someone gets to the hospital by car. The discharge information has been reviewed with the {PATIENT PARENT GUARDIAN:24652}. The {PATIENT PARENT GUARDIAN:49128} verbalized understanding. Discharge medications reviewed with the {Dishcarge meds reviewed URBE:09754} and appropriate educational materials and side effects teaching were provided. Narcotic-Analgesic/Acetaminophen (Percocet, Norco, Lorcet HD, Lortab 10/325) - (By mouth) Why this medicine is used:  
Relieves pain. Contact a nurse or doctor right away if you have: 
· Extreme weakness, shallow breathing, slow heartbeat · Severe confusion, lightheadedness, dizziness, fainting · Yellow skin or eyes, dark urine or pale stools · Severe constipation, severe stomach pain, nausea, vomiting, loss of appetite · Sweating or cold, clammy skin Common side effects: · Mild constipation, nausea, vomiting · Sleepiness, tiredness · Itching, rash © 2017 [facility] Information is for End User's use only and may not be sold, redistributed or otherwise used for commercial purposes. Discharge Orders None General Information Please provide this summary of care documentation to your next provider. Patient Signature:  ____________________________________________________________ Date:  ____________________________________________________________  
  
Larri Hazard Provider Signature:  ____________________________________________________________ Date:  ____________________________________________________________

## 2017-09-16 ENCOUNTER — HOME CARE VISIT (OUTPATIENT)
Dept: SCHEDULING | Facility: HOME HEALTH | Age: 57
End: 2017-09-16
Payer: COMMERCIAL

## 2017-09-16 PROCEDURE — G0299 HHS/HOSPICE OF RN EA 15 MIN: HCPCS

## 2017-09-16 NOTE — OP NOTES
[address]    Name:  Bonifacio Peterson  MR#:  [id number]  :  1960  Account #:  [de-identified]  Date of Adm:  09/15/2017  Date of Surgery:  09/15/2017      PREOPERATIVE DIAGNOSIS: Hematoma of left above knee  amputation stump, status post traumatic fall. POSTOPERATIVE DIAGNOSIS: Hematoma of left above knee  amputation stump, status post traumatic fall, with dehiscence of fascial  layer. PROCEDURES PERFORMED:    ESTIMATED BLOOD LOSS: 50 mL. SPECIMENS REMOVED: None. ANESTHESIA: General.    SURGEON: Liv Villalba MD    CULTURES: None. DRAINS: None. INDICATIONS FOR PROCEDURE: The patient is a 10-year-old  gentleman with a left AKA. The patient ended up having a fall and then  a large hematoma that arose from his stump site. The patient was  recommended for operative repair. The patient was given risks and  benefits of the procedure including but not limited to bleeding,  infection, damage to adjacent structures, MI, stroke, and death, as well  as need for further surgery and possible need for higher amputation. The patient was understanding of all the risks and underwent the  procedure. OPERATIVE FINDINGS: The entire fascial layer has dehisced, bone is  exposed. Large amounts of hematoma were removed. DESCRIPTION OF PROCEDURE: The patient was correctly identified  in the preoperative holding area and taken to the operating room in  stable condition. The patient had a pre-incision timeout for anesthesia. The patient was prepped and draped in normal sterile fashion  according to CDC guidelines for aseptic technique. The patient was  getting his scheduled antibiotics and kept on schedule. We then were  able to remove all previous staples along the medial and inferior  portions. The lateral portion was kept in place.  We then were able to  express all the hematoma out, irrigate and wash this out, and then  closed the fascia layer with interrupted 2-0 Vicryl sutures. Once this  was done, we then again irrigated and placed 4 x 4s, ABD, Ace  bandage and Coban around the amputation site. The wound measured  10 x 2 x 1.5 cm. The patient will likely need wound VAC as an  outpatient.         Naima Neil MD    [facility] / Nikolas Ratna  D:  09/15/2017   16:50  T:  09/15/2017   20:29  Job #:  [id number]

## 2017-09-17 ENCOUNTER — HOME CARE VISIT (OUTPATIENT)
Dept: SCHEDULING | Facility: HOME HEALTH | Age: 57
End: 2017-09-17
Payer: COMMERCIAL

## 2017-09-17 PROCEDURE — G0299 HHS/HOSPICE OF RN EA 15 MIN: HCPCS

## 2017-09-18 ENCOUNTER — HOME CARE VISIT (OUTPATIENT)
Dept: SCHEDULING | Facility: HOME HEALTH | Age: 57
End: 2017-09-18
Payer: COMMERCIAL

## 2017-09-18 VITALS
HEART RATE: 60 BPM | TEMPERATURE: 98.7 F | RESPIRATION RATE: 20 BRPM | SYSTOLIC BLOOD PRESSURE: 105 MMHG | OXYGEN SATURATION: 98 % | DIASTOLIC BLOOD PRESSURE: 51 MMHG

## 2017-09-18 VITALS — DIASTOLIC BLOOD PRESSURE: 61 MMHG | HEART RATE: 60 BPM | SYSTOLIC BLOOD PRESSURE: 146 MMHG | OXYGEN SATURATION: 97 %

## 2017-09-18 LAB
BACTERIA SPEC CULT: NORMAL
FUNGUS SMEAR,FNGSMR: NORMAL
SERVICE CMNT-IMP: NORMAL

## 2017-09-18 PROCEDURE — G0157 HHC PT ASSISTANT EA 15: HCPCS

## 2017-09-18 PROCEDURE — G0299 HHS/HOSPICE OF RN EA 15 MIN: HCPCS

## 2017-09-19 ENCOUNTER — PATIENT OUTREACH (OUTPATIENT)
Dept: FAMILY MEDICINE CLINIC | Age: 57
End: 2017-09-19

## 2017-09-19 ENCOUNTER — OFFICE VISIT (OUTPATIENT)
Dept: FAMILY MEDICINE CLINIC | Age: 57
End: 2017-09-19

## 2017-09-19 ENCOUNTER — HOME CARE VISIT (OUTPATIENT)
Dept: SCHEDULING | Facility: HOME HEALTH | Age: 57
End: 2017-09-19
Payer: COMMERCIAL

## 2017-09-19 VITALS
OXYGEN SATURATION: 99 % | DIASTOLIC BLOOD PRESSURE: 68 MMHG | RESPIRATION RATE: 16 BRPM | RESPIRATION RATE: 14 BRPM | TEMPERATURE: 97.9 F | OXYGEN SATURATION: 98 % | HEART RATE: 56 BPM | TEMPERATURE: 98.5 F | HEIGHT: 70 IN | SYSTOLIC BLOOD PRESSURE: 110 MMHG | WEIGHT: 136.5 LBS | HEART RATE: 71 BPM | SYSTOLIC BLOOD PRESSURE: 126 MMHG | DIASTOLIC BLOOD PRESSURE: 60 MMHG | BODY MASS INDEX: 19.54 KG/M2

## 2017-09-19 VITALS
TEMPERATURE: 97.8 F | OXYGEN SATURATION: 97 % | RESPIRATION RATE: 16 BRPM | SYSTOLIC BLOOD PRESSURE: 117 MMHG | DIASTOLIC BLOOD PRESSURE: 80 MMHG | HEART RATE: 87 BPM

## 2017-09-19 DIAGNOSIS — Z89.612 STATUS POST ABOVE KNEE AMPUTATION OF LEFT LOWER EXTREMITY: ICD-10-CM

## 2017-09-19 DIAGNOSIS — D50.9 IRON DEFICIENCY ANEMIA, UNSPECIFIED IRON DEFICIENCY ANEMIA TYPE: ICD-10-CM

## 2017-09-19 DIAGNOSIS — I11.0 BENIGN HYPERTENSIVE HEART DISEASE WITH SYSTOLIC CONGESTIVE HEART FAILURE, NYHA CLASS 2 (HCC): Chronic | ICD-10-CM

## 2017-09-19 DIAGNOSIS — R42 DIZZINESS: ICD-10-CM

## 2017-09-19 DIAGNOSIS — G60.9 HEREDITARY PERIPHERAL NEUROPATHY: Chronic | ICD-10-CM

## 2017-09-19 DIAGNOSIS — I11.0 BENIGN HYPERTENSIVE HEART DISEASE WITH SYSTOLIC CONGESTIVE HEART FAILURE, NYHA CLASS 2 (HCC): Primary | Chronic | ICD-10-CM

## 2017-09-19 DIAGNOSIS — T14.8XXA HEMATOMA: ICD-10-CM

## 2017-09-19 DIAGNOSIS — I48.0 PAROXYSMAL ATRIAL FIBRILLATION (HCC): ICD-10-CM

## 2017-09-19 DIAGNOSIS — I50.20 BENIGN HYPERTENSIVE HEART DISEASE WITH SYSTOLIC CONGESTIVE HEART FAILURE, NYHA CLASS 2 (HCC): Chronic | ICD-10-CM

## 2017-09-19 DIAGNOSIS — I50.22 CHRONIC SYSTOLIC HEART FAILURE (HCC): Chronic | ICD-10-CM

## 2017-09-19 DIAGNOSIS — I73.9 PAD (PERIPHERAL ARTERY DISEASE) (HCC): ICD-10-CM

## 2017-09-19 DIAGNOSIS — I50.20 BENIGN HYPERTENSIVE HEART DISEASE WITH SYSTOLIC CONGESTIVE HEART FAILURE, NYHA CLASS 2 (HCC): Primary | Chronic | ICD-10-CM

## 2017-09-19 PROCEDURE — G0299 HHS/HOSPICE OF RN EA 15 MIN: HCPCS

## 2017-09-19 RX ORDER — LOSARTAN POTASSIUM 25 MG/1
50 TABLET ORAL DAILY
Qty: 30 TAB | Refills: 1 | Status: SHIPPED | OUTPATIENT
Start: 2017-09-19 | End: 2017-11-20 | Stop reason: ALTCHOICE

## 2017-09-19 RX ORDER — LOSARTAN POTASSIUM 25 MG/1
50 TABLET ORAL DAILY
Qty: 30 TAB | Refills: 1 | Status: SHIPPED | OUTPATIENT
Start: 2017-09-19 | End: 2017-09-19 | Stop reason: SDUPTHER

## 2017-09-19 RX ORDER — POTASSIUM CHLORIDE 1.5 G/1.77G
20 POWDER, FOR SOLUTION ORAL DAILY
Qty: 30 PACKET | Refills: 0 | Status: SHIPPED | OUTPATIENT
Start: 2017-09-19 | End: 2018-12-27

## 2017-09-19 RX ORDER — FUROSEMIDE 20 MG/1
TABLET ORAL
Qty: 30 TAB | Refills: 1 | Status: SHIPPED | OUTPATIENT
Start: 2017-09-19 | End: 2017-09-19 | Stop reason: SDUPTHER

## 2017-09-19 RX ORDER — LANOLIN ALCOHOL/MO/W.PET/CERES
400 CREAM (GRAM) TOPICAL DAILY
Qty: 30 TAB | Refills: 1 | Status: SHIPPED | OUTPATIENT
Start: 2017-09-19 | End: 2017-09-19 | Stop reason: SDUPTHER

## 2017-09-19 RX ORDER — FUROSEMIDE 20 MG/1
TABLET ORAL
Qty: 30 TAB | Refills: 1 | Status: SHIPPED | OUTPATIENT
Start: 2017-09-19 | End: 2017-11-20 | Stop reason: SDUPTHER

## 2017-09-19 RX ORDER — POTASSIUM CHLORIDE 1.5 G/1.77G
20 POWDER, FOR SOLUTION ORAL DAILY
Qty: 15 PACKET | Refills: 0 | Status: SHIPPED | OUTPATIENT
Start: 2017-09-19 | End: 2017-09-19 | Stop reason: SDUPTHER

## 2017-09-19 RX ORDER — LANOLIN ALCOHOL/MO/W.PET/CERES
400 CREAM (GRAM) TOPICAL DAILY
Qty: 30 TAB | Refills: 1 | Status: SHIPPED | OUTPATIENT
Start: 2017-09-19 | End: 2017-11-20 | Stop reason: SDUPTHER

## 2017-09-19 RX ORDER — GABAPENTIN 300 MG/1
300 CAPSULE ORAL EVERY 8 HOURS
Qty: 45 CAP | Refills: 0 | Status: CANCELLED | OUTPATIENT
Start: 2017-09-19

## 2017-09-19 NOTE — TELEPHONE ENCOUNTER
Pt states that the pharmacy has changed for his prescriptions. He will be using the [facility] in [id number]. Already changed in the system.

## 2017-09-19 NOTE — PROGRESS NOTES
Call routed to me from . Spoke with Car Arzola from [facility]. She was requesting an order to remove the patients PICC line. Referred her to Dr. Alejo Live office for the order. PICC was placed while patient was in the hospital and as patients PCP we are not following the patient for the medication or PICC.

## 2017-09-19 NOTE — PROGRESS NOTES
HISTORY OF PRESENT ILLNESS  Toni Roque is a 59 y.o. male. HPI: Dr. Mary Lou Colon patient. Here for post hospital follow up and need to know that is it ok to restart coumadin. Had multiple medical concern. Complex hospital history since April he had done cardiac bypass. Able to obtain major history from discharge summery and also had recent fall and readmitted to the hospital for drainage of hematoma from stump of left above knee amputation. Currently on aspirin and plavix and off coumadin. Mild to moderate pain over stump site . Managed by current pain medication by vascular specialist.   Radha Hash fairly stable. He denies any specific concern. No sob, no chest pain. No nausea or vomiting. No abdominal pain. No urinary or bowel complains. No headache or dizziness. Has received few blood transfusion over these multiple hospital admission and felt fatigue. On high protein diet regimen as well. Today accompanied by his daughter as well. Dressing placed on hematoma site and review Dr. Sofia Ellison operative notes. Home health nurse scheduled for wound vac. Also he is done with his antibiotic so his picc line has been getting removed tomorrow. Denies any cold or cough, no fever. Sitting comfortably on wheel chair. Noted anemia. probably from lots of procedure done recently. Also on oral iron supplement. Denies any jose luis blood in stool or change in wound color. Also has h/o a.fib and he was on coumadin for that. For now HR lower side. Asymptomatic. Will continue current management but if persist will consider lowering dose. Below is the history obtain from chart. The patient is a 53-year-old White male with multiple medical comorbidities who was admitted to [facility] on 7/24/2017 due to severe sepsis.  The patient was apparently well until ~2 days prior to admission, the patient woke upand was was unable to breath so he called 911 and he was brought to the [facility] Emergency Department. CT angiogram of the chest was negative for pulmonary embolism. 2D echocardiogram showed EF 50%, severe right heart dilatation with reduced right ventricular global systolic function. He was placed on pressors and diuresed with good improvement. His WBCs were noted to be elevated at 27.7K and he was started on intravenous antibiotics. Blood cultures were reportedly positive. He was noted to have an exposed graft in the left groin with purulent drainage. He denied any pain. He had improved clinically and had been weaned off pressors. He was transferred to [facility] for further treatment by his vascular surgeon. The patient was admitted under the service of Vascular Surgery (Dr. Fanta High). WBC count was 13.9. Patient was continued on Piperacillin-Tazobactam and Vancomycin. Critical Care Medicine consult (Dr. Nazario Pepe) was called for evaluation and comanagement. Cardiology consult (Dr. Jaxon Reeves) was called for evaluation and comanagement. The patient was brought to the operating room and underwent Removal of infected graft; Repair of left superficial femoral artery; Repair of left common femoral artery on 7/25/2017 done by Dr. Fanta High. The patient tolerated the procedure well with no intraoperative complications. Blood culture drawn on 7/24/2017 yielded growth of MSSA. Surgical wound culture done 7/25 yielded growth of MSSA. Infectious Disease consult (Dr. Fernie Ceron) was called for evaluation and comanagement. Piperacillin-Tazobactam and Vancomycin were discontinued and the patient was started on Nafcillin IV continuous infusion on 7/28/2017. Blood culture drawn on 7/28/2017 yielded no growth after 6 days. On 8/2/2017, the Nafcillin IV continuous infusion was changed to Cefazolin 2 grams IV q 8 hr. Blood culture drawn on 8/2/2017 yielded no growth after 6 days.  Infectious Disease recommended the Cefazolin to be continued until 9/12/2017. The patient had remained hemodynamically stable but due to the above events, the patient was noted to be generally weak and with impaired mobility and ADLs. Patient was felt to be a good candidate for acute inpatient rehabilitation. Upon evaluation by Physical Therapy and Occupational Therapy, the patient was recommended for acute inpatient rehabilitation. WBC count on 8/7/2017 prior to discharge was 12.7. The patient was discharged and was subsequently admitted to the [facility] for Physical Rehabilitation on 8/7/2017 for intensive rehabilitation to help recover strength, function and mobility.      The patient was able to actively participate in the therapy activities but he has reported numbness from the middle of the left leg downwards. On 8/10/2017, Vascular Surgery ([address] [place] [address] Dana WILSON, [facility] Lalo Hagen) was informed of the numbness and erythema of the shin of the left leg and she was aware of it; no diagnostic studies for now; Vascular Surgery will be evaluating the patient in the AM to see if he needs further surgical intervention. On 8/12/2017, patient complained of worsening pain of LLE at rest. WBC count (8/13/2017) = 12.4. On 8/14/2017, patient was seen and evaluated by JUNE Moore and discussed with Dr. Garrett Pompa and it was felt that the patient has ischemic rest pain of the left lower extremity and the patient will be scheduled for removal of infected right bypass graft with jump bypass and left AKA on 8/18/2017. The patient was discharged / transferred to [facility] as an inpatient under the service of Vascular Surgery (Dr. Garrett Pompa) on 8/17/2017.   [place]  The patient underwent a Left above-the-knee amputation; Right axillary femoral jump bypass interposition; Removal of infected right axillary femoral bypass; Wound VAC placement of upper thigh 1.2 cm x 5.2 cm x 1.8 cm and groin 4 cm x 0.5 cm x 0.2 cm on 8/18/2017 done by Dr. Garrett Pompa.  The patient tolerated the procedure well with no reported intraoperative complications. The patient developed acute postoperative blood loss anemia and he was transfused with pRBC. Intravenous Cefazolin was continued. On 8/19/2017, the patient was noted to have an altered mental status. The Rapid response Team was called. The patient was given Lorazepam and Phenobarbital. Three-point restraints had been utilized. The [facility] (Dr. Dale Martin) was called for medical comanagement. Cefazolin was changed to Piperacillin-Tazobactam IV and Vancomycin IV on 8/19/2017. Impression was Acute metabolic encephalopathy. Patient was noted to have a prolonged QTc interval. Diltiazem and Amiodarone were put on hold. Metoprolol tartrate was continued. Cardiology consult (Dr. Debi Pardo) was called for evaluation and comanagement. Amiodarone was resumed. The confusion/alteration in mental status was noted to slowly improve. Infectious Disease consult (Dr. Basilio Campos) was called for evaluation and comanagement. On 8/22/2017, Piperacillin-Tazobactam IV and Vancomycin IV were discontinued and the patient was placed on Ampicillin-Sulbactam IV to be given until 9/19/2017. A PICC line was inserted by Interventional Radiology (Dr. Miguel A Mills) on 8/24/2017. The patient had remained hemodynamically stable but due to the above events, the patient was noted to be generally weak and with impaired mobility and ADLs. Patient was felt to be a good candidate for acute inpatient rehabilitation. Upon evaluation by Physical Therapy and Occupational Therapy, the patient was recommended for acute inpatient rehabilitation. The patient was discharged and was subsequently admitted to the [facility] for Physical Rehabilitation on 9/7/2017 for intensive rehabilitation to help recover strength, function and mobility. Review labs.    Lab Results   Component Value Date/Time    WBC 10.7 09/03/2017 03:45 AM Hemoglobin, POC 8.8 08/18/2017 10:29 AM    HGB 9.4 09/07/2017 06:15 AM    Hematocrit, POC 26 08/18/2017 10:29 AM    HCT 28.8 09/07/2017 06:15 AM    PLATELET 533 24/46/9891 06:15 AM    MCV 89.5 09/03/2017 03:45 AM     Lab Results   Component Value Date/Time    Sodium 134 09/04/2017 07:26 AM    Potassium 4.3 09/04/2017 07:26 AM    Chloride 99 09/04/2017 07:26 AM    CO2 27 09/04/2017 07:26 AM    Anion gap 8 09/04/2017 07:26 AM    Glucose 84 09/04/2017 07:26 AM    BUN 19 09/04/2017 07:26 AM    Creatinine 0.73 09/04/2017 07:26 AM    BUN/Creatinine ratio 26 09/04/2017 07:26 AM    GFR est AA >60 09/04/2017 07:26 AM    GFR est non-AA >60 09/04/2017 07:26 AM    Calcium 9.0 09/04/2017 07:26 AM    Bilirubin, total 0.3 09/03/2017 03:45 AM    AST (SGOT) 28 09/03/2017 03:45 AM    Alk. phosphatase 408 09/03/2017 03:45 AM    Protein, total 7.9 09/03/2017 03:45 AM    Albumin 2.8 09/03/2017 03:45 AM    Globulin 5.1 09/03/2017 03:45 AM    A-G Ratio 0.5 09/03/2017 03:45 AM    ALT (SGPT) 27 09/03/2017 03:45 AM     Lab Results   Component Value Date/Time    TSH 6.74 08/02/2017 05:41 AM     Lab Results   Component Value Date/Time    Hemoglobin A1c 5.2 12/29/2015 07:18 AM         ROS: Denies any headache, dizziness, no chest pain or trouble breathing, no arm or leg weakness. No nausea or vomiting, no weight or appetite changes, no depression or anxiety. No urine or bowel complains, no palpitation, no diaphoresis. Physical Exam   Constitutional: No distress. Cardiovascular: Normal heart sounds. Pulmonary/Chest: No respiratory distress. He has no wheezes. Abdominal: Soft. There is no tenderness. Musculoskeletal: He exhibits no edema. Left above knee amputation site:\" dressing placed. Clean , mild tender on touch. ASSESSMENT and PLAN    ICD-10-CM ICD-9-CM    1.  Benign hypertensive heart disease with systolic congestive heart failure, NYHA class 2 ([facility]): see below and HPI  I11.0 402.11 furosemide (LASIX) 20 mg tablet I50.20 428.20 losartan (COZAAR) 25 mg tablet     428.0    2. Chronic systolic heart failure [facility]): following cardiology. On medical management. No signs of volume over load at this time. Will observe and follow cardiologist recommendations. I50.22 428.22 furosemide (LASIX) 20 mg tablet   3. Hereditary peripheral neuropathy: said bilateral leg pain on and off. Currently post left above knee amputation. Said rt leg is not bothering him much. No pain or tingling or numbness. For now will hold off gabapentin as he was feeling on and off dizziness as well. Him and daughter understood and agree with the plan. G60.9 356.0    4. Iron deficiency anemia, unspecified iron deficiency anemia type: on iron supplement. Also on dietary supplement. On high protein diet as well. Will repeat labs next visit and if no improvement in labs will consider Gi work up and hematology referral.  D50.9 280.9    5. PAD (peripheral artery disease) ([facility]): see HPI. Multiple procedure and infection over surgical site. Now stable. Following vascular specialist. On aspirin, plavix and will restart coumadin as h/o a.fib. Will be managed by cardiologist.  I73.9 443.9    6. Status post above knee amputation of left lower extremity ([facility]): recent fall and hematoma over stump. Revision surgery done. Now plan to have home health nurse and wound vac.  Z89.612 V49.76    7. Hematoma/ over left AKA stump. home health on the board, wound vac. following Dr. Wade Soto  T14.8 924.9    8. Dizziness: stable at this time. Advised to take time with change position. Drink more fluid. R42 780.4    9. Paroxysmal atrial fibrilation: was on coumadine. Noted message from Dr. Sherie Alfaro office that due to rectal bleeding and nose bleeding so coumadin was stopped. For now will hold of coumadin. He is on aspirin and plavix. For now had colonoscopy done in 2015 will obtain official records from GI. Mean time will f/u with him in couple of weeks.  Also repeat CBC before next visit. Continue iron supplement mean time and also sending FOBT test to be completed. Though pt denied any blood in stool or urine today during office visit. Also did not say anything about nose bleed. Has also ulcers over rt heal and around rt lateral malleolus. Following podiatry and home wound care. Currently on dressing . Per patient superficial and been improving. No non tender. Dressing site is clean. Pt understood and agrees with above plan.    Review HM   Follow-up Disposition: Not on File 1 month with PCP

## 2017-09-19 NOTE — MR AVS SNAPSHOT
Visit Information Date & Time Provider Department Dept. Phone Encounter #  
 9/19/2017 11:45 AM [facility] [phone] [id number] Your Appointments 9/27/2017 11:15 AM  
HOSPITAL DISCHARGE with [facility] ([facility]) Appt Note: DC Wash out [address]  
[phone] [facility] [address]  
  
    
 10/5/2017 10:40 AM  
Follow Up with Perry Ruiz MD  
[facility] ([facility]) Appt Note: 1 mth f/up; 6/8/17 - lmom to r/s june appt. provider out of office. Luiz Zelaya; 1 mth f/up/r/s'd with the patient's daughter- provider out of the office- Shawna Bradley; $ 75. copay/$0 balance; 1 mth f/up/r/s'd with the patient's daughter- provider out of the office- J; pt has been admitted to [facility]; Rescheduling Appointment From 07/26/2017; Rescheduling Appointment From 09/27/2017 [facility] [place] [address] [address] [address] [phone] [facility] [phone]  
  
    
 1/18/2018 10:00 AM  
ROUTINE CARE with Brenda Dangelo MD  
[facility] ([facility]) Appt Note: routine f/u 6mo [address] [facility] [address] [facility] [address] [place] [address] Upcoming Health Maintenance Date Due Pneumococcal 19-64 Medium Risk (1 of 1 - PPSV23) 3/22/1977 INFLUENZA AGE 9 TO ADULT 8/1/2017 COLONOSCOPY 7/23/2020 DTaP/Tdap/Td series (2 - Td) 7/5/2026 Allergies as of 9/19/2017  Review Complete On: 9/19/2017 By: Rex Merino LPN Severity Noted Reaction Type Reactions Morphine  08/03/2017   Side Effect Other (comments) Patient gets confused with morphine. Current Immunizations  Reviewed on 7/5/2016 Name Date Tdap 7/5/2016 Not reviewed this visit You Were Diagnosed With   
  
 Codes Comments Benign hypertensive heart disease with systolic congestive heart failure, NYHA class 2 ([facility]    -  Primary ICD-10-CM: I11.0, I50.20 ICD-9-CM: 402.11, 428.20, 428.0 Chronic systolic heart failure (HCC)     ICD-10-CM: I50.22 ICD-9-CM: 428.22 Hereditary peripheral neuropathy     ICD-10-CM: G60.9 ICD-9-CM: 356.0 Iron deficiency anemia, unspecified iron deficiency anemia type     ICD-10-CM: D50.9 ICD-9-CM: 280.9 PAD (peripheral artery disease) (HCC)     ICD-10-CM: I73.9 ICD-9-CM: 443.9 Status post above knee amputation of left lower extremity ([facility]     ICD-10-CM: M27.734 ICD-9-CM: V49.76 Hematoma     ICD-10-CM: T14.8 ICD-9-CM: 924.9 Dizziness     ICD-10-CM: Y27 ICD-9-CM: 780.4 Vitals BP Pulse Temp Resp Height(growth percentile) Weight(growth percentile) 126/68 (BP 1 Location: Left arm, BP Patient Position: Sitting) (!) 56 97.9 °F (36.6 °C) (Oral) 16 5' 10\" (1.778 m) 136 lb 8 oz (61.9 kg) SpO2 BMI Smoking Status 99% 19.59 kg/m2 Former Smoker Vitals History BMI and BSA Data Body Mass Index Body Surface Area  
 19.59 kg/m 2 1.75 m 2 Preferred Pharmacy Pharmacy Name Phone [address] [facility], [address] Your Updated Medication List  
  
   
This list is accurate as of: 9/19/17  1:00 PM.  Always use your most recent med list.  
  
  
  
  
 amiodarone 200 mg tablet Commonly known as:  CORDARONE Take 1 Tab by mouth daily. Indications: VENTRICULAR RATE CONTROL IN ATRIAL FIBRILLATION  
  
 ascorbic acid (vitamin C) 250 mg tablet Commonly known as:  VITAMIN C Take 1 Tab by mouth daily (with breakfast). aspirin 81 mg chewable tablet Take 1 Tab by mouth daily (with breakfast). atorvastatin 40 mg tablet Commonly known as:  LIPITOR Take 1 Tab by mouth nightly. Indications: DYSLIPIDEMIA  
  
 cholecalciferol 1,000 unit tablet Commonly known as:  VITAMIN D3 Take 1 Tab by mouth daily. cyanocobalamin 1,000 mcg tablet Commonly known as:  VITAMIN B-12 Take 1 Tab by mouth daily. ferrous sulfate 325 mg (65 mg iron) tablet Take 1 Tab by mouth daily (with breakfast). furosemide 20 mg tablet Commonly known as:  LASIX  
1 tablet daily  Indications: Pulmonary Edema due to Chronic Heart Failure  
  
 gabapentin 300 mg capsule Commonly known as:  NEURONTIN Take 1 Cap by mouth every eight (8) hours. Indications: NEUROPATHIC PAIN  
  
 losartan 25 mg tablet Commonly known as:  COZAAR Take 2 Tabs by mouth daily. Indications: Chronic Heart Failure, hypertension  
  
 magnesium oxide 400 mg tablet Commonly known as:  MAG-OX Take 1 Tab by mouth daily. metoprolol tartrate 25 mg tablet Commonly known as:  LOPRESSOR Take 1 Tab by mouth every twelve (12) hours. Indications: hypertension, VENTRICULAR RATE CONTROL IN ATRIAL FIBRILLATION  
  
 oxyCODONE-acetaminophen 5-325 mg per tablet Commonly known as:  PERCOCET Take 1 Tab by mouth every four (4) hours as needed (for moderate to severe pain). Max Daily Amount: 6 Tabs. Indications: Pain PLAVIX 75 mg Tab Generic drug:  clopidogrel Take 75 mg by mouth daily. potassium chloride 20 mEq packet Commonly known as:  KLOR-CON Take 1 Packet by mouth daily. [while on Furosemide]  Indications: hypokalemia prevention  
  
 warfarin 5 mg tablet Commonly known as:  COUMADIN Take 1 Tab by mouth nightly. [Dose to be adjusted by Cardiology with a target INR of 2 to 3]  
  
 zinc sulfate 220 (50) mg capsule Commonly known as:  ZINCATE Take 1 Cap by mouth daily. Prescriptions Sent to Pharmacy Refills  
 furosemide (LASIX) 20 mg tablet 1 Si tablet daily  Indications: Pulmonary Edema due to Chronic Heart Failure  Class: Normal  
 Pharmacy: [id number] [facility], [place] Ph #: [phone]  
 magnesium oxide (MAG-OX) 400 mg tablet 1 Sig: Take 1 Tab by mouth daily. Class: Normal  
 Pharmacy: [id number] [facility], [address] [place] [address] Ph #: [phone] Route: Oral  
 losartan (COZAAR) 25 mg tablet 1 Sig: Take 2 Tabs by mouth daily. Indications: Chronic Heart Failure, hypertension Class: Normal  
 Pharmacy: [address] [facility], [place] [facility] Ph #: [phone] Route: Oral  
 potassium chloride (KLOR-CON) 20 mEq packet 0 Sig: Take 1 Packet by mouth daily. [while on Furosemide]  Indications: hypokalemia prevention Class: Normal  
 Pharmacy: [id number] [facility], [address] [place] [address] Ph #: [phone] Route: Oral  
  
To-Do List   
 09/20/2017 To Be Determined Appointment with Ciera Thompson RN at [address] [place] [address] MED CTR  
  
 09/21/2017 To Be Determined Appointment with Ciera Thompson RN at [address] [place] [address] SCHEDULING/INTAKE  
  
 09/21/2017 1:00 PM  
  Appointment with Hue Serrano PTA at [address] [place] [address] REG MED CTR  
  
 09/22/2017 To Be Determined Appointment with Ciera Thompson RN at [address] [place] [address] MED CTR  
  
 09/23/2017 To Be Determined Appointment with Ciera Thompson RN at [address] [place] [address] MED CTR  
  
 09/25/2017 To Be Determined Appointment with Ciera Thompson RN at [address] [place] [address] MED CTR  
  
 09/25/2017 To Be Determined Appointment with Hue Serrano PTA at [address] [place] [address] MED CTR  
  
 09/27/2017 To Be Determined Appointment with Ciera Thompson RN at [address] [place] [address] MED CTR  
  
 09/27/2017 To Be Determined Appointment with Nikki Gruber PTA at [address] [facility] MED CTR  
  
 09/29/2017 To Be Determined Appointment with Raul Hilton RN at [address] Please provide this summary of care documentation to your next provider. Your primary care clinician is listed as Robin Singer. If you have any questions after today's visit, please call [phone].

## 2017-09-20 ENCOUNTER — TELEPHONE (OUTPATIENT)
Dept: CARDIOLOGY CLINIC | Age: 57
End: 2017-09-20

## 2017-09-20 ENCOUNTER — HOME CARE VISIT (OUTPATIENT)
Dept: SCHEDULING | Facility: HOME HEALTH | Age: 57
End: 2017-09-20
Payer: COMMERCIAL

## 2017-09-20 ENCOUNTER — HOME CARE VISIT (OUTPATIENT)
Dept: HOME HEALTH SERVICES | Facility: HOME HEALTH | Age: 57
End: 2017-09-20
Payer: COMMERCIAL

## 2017-09-20 VITALS
DIASTOLIC BLOOD PRESSURE: 57 MMHG | OXYGEN SATURATION: 98 % | RESPIRATION RATE: 20 BRPM | SYSTOLIC BLOOD PRESSURE: 126 MMHG

## 2017-09-20 DIAGNOSIS — D50.9 IRON DEFICIENCY ANEMIA, UNSPECIFIED: Primary | ICD-10-CM

## 2017-09-20 PROCEDURE — G0299 HHS/HOSPICE OF RN EA 15 MIN: HCPCS

## 2017-09-20 NOTE — TELEPHONE ENCOUNTER
Confirmed with PCP office that patient is holding Coumadin until results of stool guiacac and CBC come back . Left message for Jocelyn from [facility] to inform.

## 2017-09-21 ENCOUNTER — HOME CARE VISIT (OUTPATIENT)
Dept: SCHEDULING | Facility: HOME HEALTH | Age: 57
End: 2017-09-21
Payer: COMMERCIAL

## 2017-09-21 VITALS
SYSTOLIC BLOOD PRESSURE: 147 MMHG | RESPIRATION RATE: 20 BRPM | TEMPERATURE: 97.5 F | DIASTOLIC BLOOD PRESSURE: 75 MMHG | OXYGEN SATURATION: 99 %

## 2017-09-21 VITALS — HEART RATE: 62 BPM | SYSTOLIC BLOOD PRESSURE: 141 MMHG | DIASTOLIC BLOOD PRESSURE: 63 MMHG | OXYGEN SATURATION: 98 %

## 2017-09-21 VITALS
OXYGEN SATURATION: 98 % | DIASTOLIC BLOOD PRESSURE: 67 MMHG | TEMPERATURE: 97.8 F | HEART RATE: 60 BPM | SYSTOLIC BLOOD PRESSURE: 133 MMHG | RESPIRATION RATE: 20 BRPM

## 2017-09-21 PROCEDURE — G0299 HHS/HOSPICE OF RN EA 15 MIN: HCPCS

## 2017-09-21 PROCEDURE — G0157 HHC PT ASSISTANT EA 15: HCPCS

## 2017-09-21 NOTE — TELEPHONE ENCOUNTER
Patient identified with 2 identifiers (name and ). Spoke with patient daughter Aby Maguire  in regards to patient holding coumadin until CBC and stool Occult blood done. Patient daughtre aware to  stool kit from office and have done as soon as possible. Also aware Home Health will be doing lab draw for us. Once all results are back Dr. Evon Klein will decide on restarting coumadin.

## 2017-09-22 ENCOUNTER — HOME CARE VISIT (OUTPATIENT)
Dept: SCHEDULING | Facility: HOME HEALTH | Age: 57
End: 2017-09-22
Payer: COMMERCIAL

## 2017-09-23 ENCOUNTER — HOME CARE VISIT (OUTPATIENT)
Dept: SCHEDULING | Facility: HOME HEALTH | Age: 57
End: 2017-09-23
Payer: COMMERCIAL

## 2017-09-23 PROCEDURE — G0299 HHS/HOSPICE OF RN EA 15 MIN: HCPCS

## 2017-09-25 ENCOUNTER — HOME CARE VISIT (OUTPATIENT)
Dept: SCHEDULING | Facility: HOME HEALTH | Age: 57
End: 2017-09-25
Payer: COMMERCIAL

## 2017-09-25 VITALS
HEART RATE: 60 BPM | TEMPERATURE: 98 F | RESPIRATION RATE: 20 BRPM | DIASTOLIC BLOOD PRESSURE: 67 MMHG | SYSTOLIC BLOOD PRESSURE: 140 MMHG | OXYGEN SATURATION: 98 %

## 2017-09-25 PROCEDURE — G0299 HHS/HOSPICE OF RN EA 15 MIN: HCPCS

## 2017-09-26 ENCOUNTER — HOME CARE VISIT (OUTPATIENT)
Dept: SCHEDULING | Facility: HOME HEALTH | Age: 57
End: 2017-09-26
Payer: COMMERCIAL

## 2017-09-26 ENCOUNTER — HOSPITAL ENCOUNTER (OUTPATIENT)
Dept: LAB | Age: 57
Discharge: HOME | End: 2017-09-26
Payer: COMMERCIAL

## 2017-09-26 VITALS — HEART RATE: 64 BPM | DIASTOLIC BLOOD PRESSURE: 61 MMHG | SYSTOLIC BLOOD PRESSURE: 138 MMHG | OXYGEN SATURATION: 98 %

## 2017-09-26 DIAGNOSIS — D50.9 IRON DEFICIENCY ANEMIA, UNSPECIFIED IRON DEFICIENCY ANEMIA TYPE: ICD-10-CM

## 2017-09-26 LAB
ERYTHROCYTE [DISTWIDTH] IN BLOOD BY AUTOMATED COUNT: 17.6 % (ref 11.6–14.5)
HCT VFR BLD AUTO: 31.9 % (ref 36–48)
HGB BLD-MCNC: 10.1 G/DL (ref 13–16)
IRON SATN MFR SERPL: 16 %
IRON SERPL-MCNC: 41 UG/DL (ref 50–175)
MCH RBC QN AUTO: 28.5 PG (ref 24–34)
MCHC RBC AUTO-ENTMCNC: 31.7 G/DL (ref 31–37)
MCV RBC AUTO: 90.1 FL (ref 74–97)
PLATELET # BLD AUTO: 470 K/UL (ref 135–420)
PMV BLD AUTO: 9 FL (ref 9.2–11.8)
RBC # BLD AUTO: 3.54 M/UL (ref 4.7–5.5)
TIBC SERPL-MCNC: 254 UG/DL (ref 250–450)
WBC # BLD AUTO: 9.9 K/UL (ref 4.6–13.2)

## 2017-09-26 PROCEDURE — 85027 COMPLETE CBC AUTOMATED: CPT | Performed by: FAMILY MEDICINE

## 2017-09-26 PROCEDURE — G0299 HHS/HOSPICE OF RN EA 15 MIN: HCPCS

## 2017-09-26 PROCEDURE — G0157 HHC PT ASSISTANT EA 15: HCPCS

## 2017-09-26 PROCEDURE — 83540 ASSAY OF IRON: CPT | Performed by: FAMILY MEDICINE

## 2017-09-27 ENCOUNTER — HOSPITAL ENCOUNTER (OUTPATIENT)
Dept: LAB | Age: 57
Discharge: HOME | End: 2017-09-27
Payer: COMMERCIAL

## 2017-09-27 ENCOUNTER — OFFICE VISIT (OUTPATIENT)
Dept: VASCULAR SURGERY | Age: 57
End: 2017-09-27

## 2017-09-27 ENCOUNTER — HOME CARE VISIT (OUTPATIENT)
Dept: SCHEDULING | Facility: HOME HEALTH | Age: 57
End: 2017-09-27
Payer: COMMERCIAL

## 2017-09-27 VITALS
RESPIRATION RATE: 20 BRPM | OXYGEN SATURATION: 98 % | SYSTOLIC BLOOD PRESSURE: 132 MMHG | TEMPERATURE: 98.5 F | HEART RATE: 60 BPM | DIASTOLIC BLOOD PRESSURE: 70 MMHG

## 2017-09-27 VITALS
WEIGHT: 136 LBS | DIASTOLIC BLOOD PRESSURE: 62 MMHG | HEIGHT: 70 IN | SYSTOLIC BLOOD PRESSURE: 120 MMHG | BODY MASS INDEX: 19.47 KG/M2 | HEART RATE: 86 BPM

## 2017-09-27 VITALS
SYSTOLIC BLOOD PRESSURE: 137 MMHG | DIASTOLIC BLOOD PRESSURE: 79 MMHG | HEART RATE: 61 BPM | TEMPERATURE: 97.7 F | OXYGEN SATURATION: 98 % | RESPIRATION RATE: 20 BRPM

## 2017-09-27 DIAGNOSIS — L97.319: Chronic | ICD-10-CM

## 2017-09-27 DIAGNOSIS — L97.419 CHRONIC ULCER OF RIGHT HEEL (HCC): Chronic | ICD-10-CM

## 2017-09-27 DIAGNOSIS — I70.229 CRITICAL ISCHEMIA OF LOWER EXTREMITY (HCC): ICD-10-CM

## 2017-09-27 DIAGNOSIS — T87.89: Primary | ICD-10-CM

## 2017-09-27 DIAGNOSIS — R79.89 ELEVATED PLATELET COUNT: ICD-10-CM

## 2017-09-27 DIAGNOSIS — D50.9 IRON DEFICIENCY ANEMIA, UNSPECIFIED IRON DEFICIENCY ANEMIA TYPE: Primary | ICD-10-CM

## 2017-09-27 LAB — HEMOCCULT STL QL: NEGATIVE

## 2017-09-27 PROCEDURE — G0299 HHS/HOSPICE OF RN EA 15 MIN: HCPCS

## 2017-09-27 PROCEDURE — 82272 OCCULT BLD FECES 1-3 TESTS: CPT | Performed by: INTERNAL MEDICINE

## 2017-09-27 NOTE — PROGRESS NOTES
Patient identified with 2 identifiers (name and ).   Patient aware of lab results and need for referral to Hematology

## 2017-09-27 NOTE — PROGRESS NOTES
Sent to hematology. Still FOBT pending. Anemia could be from multiple vascular procedure. Will follow hematology recommendations.

## 2017-09-27 NOTE — MR AVS SNAPSHOT
Visit Information Date & Time Provider Department Dept. Phone Encounter #  
 9/27/2017 11:15 AM YECENIA Baptiste [facility] [phone] [id number] Your Appointments 10/4/2017  2:30 PM  
Follow Up with JUNE [facility] ([facility]) Appt Note: wound check and staples removal 30 minutes per clifton [address] [facility] [address]  
[phone] [address] [facility]  
  
    
 10/5/2017 10:40 AM  
Follow Up with Law Brown MD  
[facility] ([facility]) Appt Note: 1 mth f/up; 6/8/17 - lmom to r/s june appt. provider out of office. Santana Arango; 1 mth f/up/r/s'd with the patient's daughter- provider out of the office- Dillon Bassett; $ 75. copay/$0 balance; 1 mth f/up/r/s'd with the patient's daughter- provider out of the office- J; pt has been admitted to [facility]; Rescheduling Appointment From 07/26/2017; Rescheduling Appointment From 09/27/2017 Priyanka Samayoa [address]  
[phone] Abram Carr  
  
    
 11/20/2017  9:00 AM  
ROUTINE CARE with Silvio Parker MD  
[facility] ([facility]) Appt Note: routine f/u 2mo [address] [facility] [address]  
[address] [facility] [address] [facility] [address]  
  
    
 1/18/2018 10:00 AM  
ROUTINE CARE with Silvio Parker MD  
[facility] ([facility]) Appt Note: routine f/u 6mo [address] [facility] [address]  
[phone] Upcoming Health Maintenance Date Due Pneumococcal 19-64 Medium Risk (1 of 1 - PPSV23) 3/22/1979 INFLUENZA AGE 9 TO ADULT 8/1/2017 COLONOSCOPY 7/23/2020 DTaP/Tdap/Td series (2 - Td) 7/5/2026 Allergies as of 9/27/2017  Review Complete On: 9/27/2017 By: Fran Gold LPN Severity Noted Reaction Type Reactions Morphine  08/03/2017   Side Effect Other (comments) Patient gets confused with morphine. Current Immunizations  Reviewed on 7/5/2016 Name Date Tdap 7/5/2016 Not reviewed this visit Vitals BP Pulse Height(growth percentile) Weight(growth percentile) BMI Smoking Status 120/62 (BP 1 Location: Left arm, BP Patient Position: Sitting) 86 5' 10\" (1.778 m) 136 lb (61.7 kg) 19.51 kg/m2 Former Smoker BMI and BSA Data Body Mass Index Body Surface Area  
 19.51 kg/m 2 1.75 m 2 Preferred Pharmacy Pharmacy Name Phone [facility], [address] Your Updated Medication List  
  
   
This list is accurate as of: 9/27/17 12:03 PM.  Always use your most recent med list.  
  
  
  
  
 amiodarone 200 mg tablet Commonly known as:  CORDARONE Take 1 Tab by mouth daily. Indications: VENTRICULAR RATE CONTROL IN ATRIAL FIBRILLATION  
  
 ascorbic acid (vitamin C) 250 mg tablet Commonly known as:  VITAMIN C Take 1 Tab by mouth daily (with breakfast). aspirin 81 mg chewable tablet Take 1 Tab by mouth daily (with breakfast). atorvastatin 40 mg tablet Commonly known as:  LIPITOR Take 1 Tab by mouth nightly. Indications: DYSLIPIDEMIA  
  
 cholecalciferol 1,000 unit tablet Commonly known as:  VITAMIN D3 Take 1 Tab by mouth daily. cyanocobalamin 1,000 mcg tablet Commonly known as:  VITAMIN B-12 Take 1 Tab by mouth daily. ferrous sulfate 325 mg (65 mg iron) tablet Take 1 Tab by mouth daily (with breakfast). furosemide 20 mg tablet Commonly known as:  LASIX  
1 tablet daily  Indications: Pulmonary Edema due to Chronic Heart Failure  
  
 gabapentin 300 mg capsule Commonly known as:  NEURONTIN  
 Take 1 Cap by mouth every eight (8) hours. Indications: NEUROPATHIC PAIN  
  
 HEPARIN FLUSH IV  
3 mL by IntraVENous route two (2) times a week. losartan 25 mg tablet Commonly known as:  COZAAR Take 2 Tabs by mouth daily. Indications: Chronic Heart Failure, hypertension  
  
 magnesium oxide 400 mg tablet Commonly known as:  MAG-OX Take 1 Tab by mouth daily. metoprolol tartrate 25 mg tablet Commonly known as:  LOPRESSOR Take 1 Tab by mouth every twelve (12) hours. Indications: hypertension, VENTRICULAR RATE CONTROL IN ATRIAL FIBRILLATION  
  
 NORMAL SALINE FLUSH INJECTION 10 mL by IntraVENous route daily. PICC line patency  
  
 oxyCODONE-acetaminophen 5-325 mg per tablet Commonly known as:  PERCOCET Take 1 Tab by mouth every four (4) hours as needed (for moderate to severe pain). Max Daily Amount: 6 Tabs. Indications: Pain PLAVIX 75 mg Tab Generic drug:  clopidogrel Take 75 mg by mouth daily. potassium chloride 20 mEq packet Commonly known as:  KLOR-CON Take 1 Packet by mouth daily. [while on Furosemide]  Indications: hypokalemia prevention VASCULAR CATHETER: [facility] AND PICC FLUSH PANEL 10 mL by IntraVENous route daily. warfarin 5 mg tablet Commonly known as:  COUMADIN Take 1 Tab by mouth nightly. [Dose to be adjusted by Cardiology with a target INR of 2 to 3]  
  
 zinc sulfate 220 (50) mg capsule Commonly known as:  ZINCATE Take 1 Cap by mouth daily. To-Do List   
 09/28/2017 1:00 PM  
  Appointment with Rojas Mabry PTA at [address] [place] [address] [facility]  
  
 09/29/2017 To Be Determined Appointment with Ana Miles RN at [address] [place] [address] [facility]  
  
 10/02/2017 To Be Determined Appointment with Willi Aponte at [address] [place] [address] SCHEDULING/INTAKE  
  
 10/04/2017 To Be Determined Appointment with Mallory Torrez at [address] [facility] SCHEDULING/INTAKE  
  
 10/06/2017 To Be Determined Appointment with Mallory Torrez at [address] [facility] SCHEDULING/INTAKE  
  
 10/09/2017 To Be Determined Appointment with Mallory Torrez at [address] [facility] SCHEDULING/INTAKE  
  
 10/11/2017 To Be Determined Appointment with Mallory Torrez at [address] [facility] SCHEDULING/INTAKE  
  
 10/13/2017 To Be Determined [facility] Please provide this summary of care documentation to your next provider. Your primary care clinician is listed as Lisa Thibodeaux. If you have any questions after today's visit, please call [phone].

## 2017-09-28 ENCOUNTER — TELEPHONE (OUTPATIENT)
Dept: FAMILY MEDICINE CLINIC | Age: 57
End: 2017-09-28

## 2017-09-28 ENCOUNTER — PATIENT OUTREACH (OUTPATIENT)
Dept: FAMILY MEDICINE CLINIC | Age: 57
End: 2017-09-28

## 2017-09-28 ENCOUNTER — HOME CARE VISIT (OUTPATIENT)
Dept: SCHEDULING | Facility: HOME HEALTH | Age: 57
End: 2017-09-28
Payer: COMMERCIAL

## 2017-09-28 VITALS
TEMPERATURE: 98.2 F | DIASTOLIC BLOOD PRESSURE: 70 MMHG | HEART RATE: 72 BPM | OXYGEN SATURATION: 94 % | SYSTOLIC BLOOD PRESSURE: 124 MMHG | RESPIRATION RATE: 18 BRPM

## 2017-09-28 DIAGNOSIS — G60.9 HEREDITARY PERIPHERAL NEUROPATHY: Chronic | ICD-10-CM

## 2017-09-28 PROCEDURE — G0151 HHCP-SERV OF PT,EA 15 MIN: HCPCS

## 2017-09-28 RX ORDER — GABAPENTIN 300 MG/1
300 CAPSULE ORAL 3 TIMES DAILY
Qty: 84 CAP | Refills: 3 | Status: SHIPPED | OUTPATIENT
Start: 2017-09-28 | End: 2018-01-19 | Stop reason: SDUPTHER

## 2017-09-28 NOTE — TELEPHONE ENCOUNTER
Eve [place] from [facility] called this morning to speak to nurse Jazzy Lyn. She would like to inform nurse that she has scheduled appointment for patient on 10/3/17 @ 10:30am. Arrival time @10:00am. Please inform patient of appointment per request. Nurse navigator Khanh Postal informed as well and will inform patient.

## 2017-09-28 NOTE — PROGRESS NOTES
Gabapentin 300 mg capsule, take one capsule by mouth three times a day, per verbal order Dr. Peggy Barajas.

## 2017-09-28 NOTE — TELEPHONE ENCOUNTER
BEAU Duarte        Cc: Jocelyn Coreas LPN; Philippe Gauthierr, REUBEN       Caller: Unspecified (Today,  9:17 AM)                     Marti Connelly and made her aware of the Hematology appointment.

## 2017-09-28 NOTE — PROGRESS NOTES
Nurse Navigator called  patient for John Leo follow up call and spoke with patient's daughter. Patient was  Hospitalized on 8/25/17-9/8/17 at [facility] for diagnosis of Status post above knee amputation of left lower extremity. Made patients daughter aware of his appointment with   Madelaine Sol [facility]. October 3, 2017  At 10:30 and the patient needs to be there at 10:00  Phone number provided to daughter for future contact. Patient reports:   Daughter reports that the patient is doing really well. She and her  are in the process of moving this weekend. The patient will be moving with them. After the move on Saturday patient will have 3 steps to get up to get in house instead of the 25 he has now. Patient continues to work with PT and PT has requested an extension for treatment so that patient can become acclimated to new home. Patient denies:  Any needs at this time.         Patient has the following up coming appointments:  10/4/2017 2:30 PM Arelis Isabel [address] [facility] 10.   10/5/2017 10:40 AM Imna Bradley MD [facility]

## 2017-09-29 ENCOUNTER — HOME CARE VISIT (OUTPATIENT)
Dept: SCHEDULING | Facility: HOME HEALTH | Age: 57
End: 2017-09-29
Payer: COMMERCIAL

## 2017-09-29 VITALS — SYSTOLIC BLOOD PRESSURE: 128 MMHG | OXYGEN SATURATION: 99 % | HEART RATE: 76 BPM | DIASTOLIC BLOOD PRESSURE: 63 MMHG

## 2017-09-29 PROCEDURE — G0299 HHS/HOSPICE OF RN EA 15 MIN: HCPCS

## 2017-09-30 VITALS
OXYGEN SATURATION: 99 % | HEART RATE: 57 BPM | SYSTOLIC BLOOD PRESSURE: 147 MMHG | RESPIRATION RATE: 20 BRPM | TEMPERATURE: 98 F | DIASTOLIC BLOOD PRESSURE: 73 MMHG

## 2017-10-02 ENCOUNTER — HOME CARE VISIT (OUTPATIENT)
Dept: SCHEDULING | Facility: HOME HEALTH | Age: 57
End: 2017-10-02
Payer: COMMERCIAL

## 2017-10-02 ENCOUNTER — TELEPHONE (OUTPATIENT)
Dept: CARDIOLOGY CLINIC | Age: 57
End: 2017-10-02

## 2017-10-02 ENCOUNTER — HOME CARE VISIT (OUTPATIENT)
Dept: HOME HEALTH SERVICES | Facility: HOME HEALTH | Age: 57
End: 2017-10-02
Payer: COMMERCIAL

## 2017-10-02 PROCEDURE — G0299 HHS/HOSPICE OF RN EA 15 MIN: HCPCS

## 2017-10-02 NOTE — TELEPHONE ENCOUNTER
Received a call from [facility] regarding patient's Coumadin being on hold. I spoke with Inga Roth at Dr. Jazz Randall office, she said patient has an appointment with Dr. Murphy Elam tomorrow. Patient's coumadin has been on hold since 9/15/17 as ordered by Dr. Peg Thrasher. Eva made aware that coumadin remains on hold. Telephone     9/15/2017  [facility] [address], MD   Cardiology    Medication Evaluation, Anticoagulation   Reason for call    Conversation: Medication Evaluation   (Oldest Message First)            9/15/17 9:59 AM      You contacted Lynnette Caro (Emergency Contact)    Me        9/15/17 9:59 AM   Note      I spoke with patient's daughter Ashley Fitzpatrick to inform her of Coumadin instructions post procedure scheduled for today. She informed me during the call that patient had bright red blood in stool yesterday, coughed up blood clots and nose bleed on Wednesday. Patient also fell on Wednesday. I discussed with Dr. Peg Thrasher. Verbal order and read back per Sayra Livingston MD patient to hold Coumadin at this time and follow up with PCP. I spoke with Mary Cohen at Dr. Jazz Randall office. Mary Cohen reviewed most recent labs in [facility] [address]. She stated patient is scheduled for follow up on Tuesday at 1145 am with Dr. Diana Mckeon. She was informed of the above incidents with bleeding. I attempted to reach the patient's daughter to instruct her to hold the Coumadin, however she did not answer and her mail box is full. I will try again later.

## 2017-10-03 ENCOUNTER — HOME CARE VISIT (OUTPATIENT)
Dept: HOME HEALTH SERVICES | Facility: HOME HEALTH | Age: 57
End: 2017-10-03
Payer: COMMERCIAL

## 2017-10-03 ENCOUNTER — HOME CARE VISIT (OUTPATIENT)
Dept: SCHEDULING | Facility: HOME HEALTH | Age: 57
End: 2017-10-03
Payer: COMMERCIAL

## 2017-10-03 VITALS
DIASTOLIC BLOOD PRESSURE: 62 MMHG | OXYGEN SATURATION: 99 % | TEMPERATURE: 98.7 F | HEART RATE: 62 BPM | SYSTOLIC BLOOD PRESSURE: 120 MMHG

## 2017-10-03 VITALS
HEART RATE: 71 BPM | TEMPERATURE: 99.4 F | OXYGEN SATURATION: 97 % | SYSTOLIC BLOOD PRESSURE: 105 MMHG | RESPIRATION RATE: 18 BRPM | DIASTOLIC BLOOD PRESSURE: 56 MMHG

## 2017-10-03 PROCEDURE — G0157 HHC PT ASSISTANT EA 15: HCPCS

## 2017-10-04 ENCOUNTER — OFFICE VISIT (OUTPATIENT)
Dept: VASCULAR SURGERY | Age: 57
End: 2017-10-04

## 2017-10-04 ENCOUNTER — TELEPHONE (OUTPATIENT)
Dept: FAMILY MEDICINE CLINIC | Age: 57
End: 2017-10-04

## 2017-10-04 ENCOUNTER — HOME CARE VISIT (OUTPATIENT)
Dept: SCHEDULING | Facility: HOME HEALTH | Age: 57
End: 2017-10-04
Payer: COMMERCIAL

## 2017-10-04 ENCOUNTER — TELEPHONE (OUTPATIENT)
Dept: CARDIOLOGY CLINIC | Age: 57
End: 2017-10-04

## 2017-10-04 ENCOUNTER — HOME CARE VISIT (OUTPATIENT)
Dept: HOME HEALTH SERVICES | Facility: HOME HEALTH | Age: 57
End: 2017-10-04
Payer: COMMERCIAL

## 2017-10-04 DIAGNOSIS — T87.89: Primary | ICD-10-CM

## 2017-10-04 DIAGNOSIS — Z89.612 STATUS POST ABOVE KNEE AMPUTATION OF LEFT LOWER EXTREMITY: ICD-10-CM

## 2017-10-04 DIAGNOSIS — I73.9 PAD (PERIPHERAL ARTERY DISEASE) (HCC): ICD-10-CM

## 2017-10-04 DIAGNOSIS — I48.0 PAROXYSMAL ATRIAL FIBRILLATION (HCC): ICD-10-CM

## 2017-10-04 DIAGNOSIS — L97.319: Chronic | ICD-10-CM

## 2017-10-04 DIAGNOSIS — Z79.01 ANTICOAGULATED ON COUMADIN: ICD-10-CM

## 2017-10-04 RX ORDER — OXYCODONE AND ACETAMINOPHEN 5; 325 MG/1; MG/1
1 TABLET ORAL
Qty: 20 TAB | Refills: 0 | Status: SHIPPED | OUTPATIENT
Start: 2017-10-04 | End: 2017-11-20

## 2017-10-04 NOTE — MR AVS SNAPSHOT
Visit Information Date & Time Provider Department Dept. Phone Encounter #  
 10/4/2017  2:30 PM JUNE Lai [facility] [phone] [id number] Your Appointments 10/5/2017 10:40 AM  
Follow Up with Mahogany Tom MD  
[facility] ([address] [place] [address]) Appt Note: 1 mth f/up; 6/8/17 - lmom to r/s francis appt. provider out of office. Carol Ramírez; 1 mth f/up/r/s'd with the patient's daughter- provider out of the office- Calderon Childress; $ 75. copay/$0 balance; 1 mth f/up/r/s'd with the patient's daughter- provider out of the office- ALICIA; pt has been admitted to [facility]; Rescheduling Appointment From 07/26/2017; Rescheduling Appointment From 09/27/2017 [address] [address] [phone] [facility]  
  
    
 10/25/2017 11:30 AM  
Follow Up with Alma Veronica, [address] Lalo [facility] ([address] [place] [address]) Appt Note: 3 weeks fup no studies [address] [facility] 906 [address] [facility] [address]  
[phone] [address] [facility] [address] [place] [address]  
  
    
 11/20/2017  9:00 AM  
ROUTINE CARE with Leticia Rucker MD  
[address] [facility] ([address] [place] [address]) Appt Note: routine f/u 2mo [address] [facility]  
225 [facility] [address] [facility]  
  
    
 1/18/2018 10:00 AM  
ROUTINE CARE with Leticia Rucker MD  
[address] [facility] ([address] [place] [address]) Appt Note: routine f/u 6mo [address] [facility] [address]  
[phone] Upcoming Health Maintenance Date Due Pneumococcal 19-64 Medium Risk (1 of 1 - PPSV23) 3/22/1977 INFLUENZA AGE 9 TO ADULT 8/1/2017 COLONOSCOPY 7/23/2020 DTaP/Tdap/Td series (2 - Td) 7/5/2026 Allergies as of 10/4/2017  Review Complete On: 10/4/2017 By: Beverly Centeno LPN Severity Noted Reaction Type Reactions Morphine  08/03/2017   Side Effect Other (comments) Patient gets confused with morphine. Current Immunizations  Reviewed on 7/5/2016 Name Date Tdap 7/5/2016 Not reviewed this visit You Were Diagnosed With   
  
 Codes Comments Status post above knee amputation of left lower extremity ([facility] [address]     ICD-10-CM: E21.766 ICD-9-CM: V49.76 Vitals Smoking Status Former Smoker Preferred Pharmacy Pharmacy Name Phone [facility] [address], [address] [facility] [address] [facility] Your Updated Medication List  
  
   
This list is accurate as of: 10/4/17  3:15 PM.  Always use your most recent med list.  
  
  
  
  
 amiodarone 200 mg tablet Commonly known as:  CORDARONE Take 1 Tab by mouth daily. Indications: VENTRICULAR RATE CONTROL IN ATRIAL FIBRILLATION  
  
 ascorbic acid (vitamin C) 250 mg tablet Commonly known as:  VITAMIN C Take 1 Tab by mouth daily (with breakfast). aspirin 81 mg chewable tablet Take 1 Tab by mouth daily (with breakfast). atorvastatin 40 mg tablet Commonly known as:  LIPITOR Take 1 Tab by mouth nightly. Indications: DYSLIPIDEMIA  
  
 cholecalciferol 1,000 unit tablet Commonly known as:  VITAMIN D3 Take 1 Tab by mouth daily. cyanocobalamin 1,000 mcg tablet Commonly known as:  VITAMIN B-12 Take 1 Tab by mouth daily. ferrous sulfate 325 mg (65 mg iron) tablet Take 1 Tab by mouth daily (with breakfast). furosemide 20 mg tablet Commonly known as:  LASIX  
1 tablet daily  Indications: Pulmonary Edema due to Chronic Heart Failure  
  
 gabapentin 300 mg capsule Commonly known as:  NEURONTIN Take 1 Cap by mouth three (3) times daily. Indications: NEUROPATHIC PAIN  
  
 HEPARIN FLUSH IV  
3 mL by IntraVENous route two (2) times a week. losartan 25 mg tablet Commonly known as:  COZAAR Take 2 Tabs by mouth daily. Indications: Chronic Heart Failure, hypertension  
  
 magnesium oxide 400 mg tablet Commonly known as:  MAG-OX Take 1 Tab by mouth daily. metoprolol tartrate 25 mg tablet Commonly known as:  LOPRESSOR Take 1 Tab by mouth every twelve (12) hours. Indications: hypertension, VENTRICULAR RATE CONTROL IN ATRIAL FIBRILLATION  
  
 NORMAL SALINE FLUSH INJECTION 10 mL by IntraVENous route daily. PICC line patency  
  
 oxyCODONE-acetaminophen 5-325 mg per tablet Commonly known as:  PERCOCET Take 1 Tab by mouth every four (4) hours as needed (for moderate to severe pain). Max Daily Amount: 6 Tabs. Indications: Pain PLAVIX 75 mg Tab Generic drug:  clopidogrel Take 75 mg by mouth daily. potassium chloride 20 mEq packet Commonly known as:  KLOR-CON Take 1 Packet by mouth daily. [while on Furosemide]  Indications: hypokalemia prevention VASCULAR CATHETER: [facility] AND PICC FLUSH PANEL 10 mL by IntraVENous route daily. warfarin 5 mg tablet Commonly known as:  COUMADIN Take 1 Tab by mouth nightly. [Dose to be adjusted by Cardiology with a target INR of 2 to 3]  
  
 zinc sulfate 220 (50) mg capsule Commonly known as:  ZINCATE Take 1 Cap by mouth daily. Prescriptions Printed Refills  
 oxyCODONE-acetaminophen (PERCOCET) 5-325 mg per tablet 0 Sig: Take 1 Tab by mouth every four (4) hours as needed (for moderate to severe pain). Max Daily Amount: 6 Tabs. Indications: Pain Class: Print Route: Oral  
  
To-Do List   
 10/05/2017 11:30 AM  
  Appointment with Cherri Payan PTA at [address] [place] [address] SCHEDULING/INTAKE  
  
 10/06/2017 To Be Determined Appointment with Karon Shelby RN at [facility]  
  
 10/09/2017 To Be Determined Appointment with Marvin Clark RN at [address] [facility]  
  
 10/11/2017 To Be Determined Appointment with Lord Tushar PTA at [address] [facility]  
  
 10/11/2017 To Be Determined Appointment with Marvin Clark RN at [address] [facility]  
  
 10/13/2017 To Be Determined Appointment with Lord Tushar PTA at [address] [facility]  
  
 10/13/2017 To Be Determined Appointment with Marvin Clark RN at [address] [place] [address] Please provide this summary of care documentation to your next provider. Your primary care clinician is listed as Kaylie Parr. If you have any questions after today's visit, please call [phone].

## 2017-10-04 NOTE — TELEPHONE ENCOUNTER
Received a call from Mortimer Apgar at Dr. Celso Barrios office. [facility] states Dr. Edilia Dawn cleared patient to resume Coumadin. Patient was seen by hematology (Dr. Tom Webster) yesterday as well. Per [facility] hemoccult was negative and labs are improving. Verbal order and read back per Jelani Montoya NP  Patient will resume Coumadin 2.5 mg daily except 5 mg on e, Thu and Sat. Check INR on 10/9/17. [address] [facility] made aware and she will inform daughter.  Nette Funes LPN

## 2017-10-04 NOTE — TELEPHONE ENCOUNTER
Spoke with Toney Caldera and she is aware of okay for patient to restart Coumadin. Toney Caldera advised that she will inform patient of dosage for coumadin to resume. Toney Caldera will let home Health know when to take patients PT/INR .

## 2017-10-04 NOTE — PROGRESS NOTES
Cleansed wound to left AKA with wound cleanser and gauze, flushed with 10cc NS, patient tolerated well. Wound is beefy red with small amount bloody drainage, surrounding tissue WNL. All staples were removed from intact incision on lateral side of AKA and patient tolerated well. Site was slight red but believe this was from the staples and being under the drape of the wound vac. Applied thin layer of barrier cream to this area. Wound measures: 4.0x1.0x2.0. Applied black foam to wound bed, covered with drape, cut quarter size hole, applied bigger piece of black foam and covered with drape and then cut another quarter size hole and then able to apply suction pad and secured with more drape. Had good seal with pressure set at 125 mmHg continuous pressure, patient tolerated well.

## 2017-10-04 NOTE — TELEPHONE ENCOUNTER
Dr. Cory Mendoza aware that I spoke with Noel Torrez at Dr. Shirley Alfaro and patient will be stated back on Coumadin 2.5 mg M-W-F and sunday. Coumadin 5 mg T-Th-sat.  Home health to do PT/INR on Monday 10/09/2017

## 2017-10-04 NOTE — PROGRESS NOTES
49-year-old gentleman with a left AKA. He ended up having a fall and developed a large hematoma that arose from his stump site. He underwent washout of hematoma in the OR with wound vac placement. He presents today for wound check. He is doing well. He has [address] coming 3 days per week. He has no complaints in the office today . No fever/chills. Pain well controlled. He does have significant pain with wound vac changes however and this is the only time that he requires narcotic pain medication. The wounds on his right foot continue to heal. He was seen by Podiatry today. He does have one small area on his right chest (overlying ax-fem bypass) which continues to slowly heal. Scant drainage reported. Overall he feels that he is doing much better and continues to grow stronger day by day. He has gained ~5 lbs and eating well. We will have him return to the office in 3 weeks for wound assessment. I am hopeful that we can DC his wound VAC at that time if not sooner. He will call the office sooner as needed. We will also order follow-up arterial studies for his right leg at that time. As far as his anticoagulation is concerned discussed that we recommend continuing at least a daily aspirin. He will also need either Plavix or anticoagulation with Coumadin versus NOAC. Will defer Plavix vs anticoagulation to Cardiology.    Patient expresses understanding and agrees to plan

## 2017-10-05 ENCOUNTER — HOME CARE VISIT (OUTPATIENT)
Dept: SCHEDULING | Facility: HOME HEALTH | Age: 57
End: 2017-10-05
Payer: COMMERCIAL

## 2017-10-05 ENCOUNTER — OFFICE VISIT (OUTPATIENT)
Dept: CARDIOLOGY CLINIC | Age: 57
End: 2017-10-05

## 2017-10-05 VITALS
TEMPERATURE: 98.6 F | DIASTOLIC BLOOD PRESSURE: 78 MMHG | OXYGEN SATURATION: 99 % | SYSTOLIC BLOOD PRESSURE: 130 MMHG | HEART RATE: 64 BPM

## 2017-10-05 VITALS
HEIGHT: 70 IN | WEIGHT: 142 LBS | HEART RATE: 64 BPM | SYSTOLIC BLOOD PRESSURE: 118 MMHG | DIASTOLIC BLOOD PRESSURE: 66 MMHG | BODY MASS INDEX: 20.33 KG/M2 | OXYGEN SATURATION: 99 %

## 2017-10-05 DIAGNOSIS — I50.22 CHRONIC SYSTOLIC HEART FAILURE (HCC): Chronic | ICD-10-CM

## 2017-10-05 DIAGNOSIS — I11.0 BENIGN HYPERTENSIVE HEART DISEASE WITH SYSTOLIC CONGESTIVE HEART FAILURE, NYHA CLASS 2 (HCC): Chronic | ICD-10-CM

## 2017-10-05 DIAGNOSIS — I25.5 ISCHEMIC CARDIOMYOPATHY: Primary | Chronic | ICD-10-CM

## 2017-10-05 DIAGNOSIS — I50.20 BENIGN HYPERTENSIVE HEART DISEASE WITH SYSTOLIC CONGESTIVE HEART FAILURE, NYHA CLASS 2 (HCC): Chronic | ICD-10-CM

## 2017-10-05 DIAGNOSIS — I77.9 BILATERAL CAROTID ARTERY DISEASE (HCC): Chronic | ICD-10-CM

## 2017-10-05 DIAGNOSIS — I25.10 CORONARY ARTERY DISEASE INVOLVING NATIVE CORONARY ARTERY OF NATIVE HEART WITHOUT ANGINA PECTORIS: Chronic | ICD-10-CM

## 2017-10-05 PROCEDURE — G0157 HHC PT ASSISTANT EA 15: HCPCS

## 2017-10-05 RX ORDER — BISMUTH SUBSALICYLATE 262 MG
1 TABLET,CHEWABLE ORAL DAILY
COMMUNITY

## 2017-10-05 NOTE — PROGRESS NOTES
1. Have you been to the ER, urgent care clinic since your last visit? Hospitalized since your last visit?no    2. Have you seen or consulted any other health care providers outside of the [address] [place] [address] since your last visit? Include any pap smears or colon screening.  no

## 2017-10-05 NOTE — MR AVS SNAPSHOT
Visit Information Date & Time Provider Department Dept. Phone Encounter #  
 10/5/2017 10:40 AM Nahomy Owusu MD [facility] [address] [id number] Your Appointments 10/25/2017 11:30 AM  
[facility] ([facility] Appt Note: 3 weeks fup no studies 2300 [facility] 376 706 [facility]  
[phone] [address]  
  
    
 11/20/2017  9:00 AM  
ROUTINE CARE with Samy العراقي MD  
[facility] ([facility]) Appt Note: routine f/u 2mo 8 [facility] [address] [place] [address]  
225 [facility] [address] [place] [address]  
  
    
 1/18/2018 10:00 AM  
ROUTINE CARE with Samy العراقي MD  
[facility] ([facility]) Appt Note: routine f/u 6mo 8 [facility] [address] [place] [address]  
[phone] Upcoming Health Maintenance Date Due Pneumococcal 19-64 Medium Risk (1 of 1 - PPSV23) 3/22/1977 INFLUENZA AGE 9 TO ADULT 8/1/2017 COLONOSCOPY 7/23/2020 DTaP/Tdap/Td series (2 - Td) 7/5/2026 Allergies as of 10/5/2017  Review Complete On: 10/4/2017 By: JUNE Washington Severity Noted Reaction Type Reactions Morphine  08/03/2017   Side Effect Other (comments) Patient gets confused with morphine. Current Immunizations  Reviewed on 7/5/2016 Name Date Tdap 7/5/2016 Not reviewed this visit You Were Diagnosed With   
  
 Codes Comments Ischemic cardiomyopathy    -  Primary ICD-10-CM: I25.5 ICD-9-CM: 414.8 Chronic systolic heart failure (HCC)     ICD-10-CM: I50.22 ICD-9-CM: 428.22 Coronary artery disease involving native coronary artery of native heart without angina pectoris     ICD-10-CM: I25.10 ICD-9-CM: 414.01   
 Benign hypertensive heart disease with systolic congestive heart failure, NYHA class 2 ([facility])     ICD-10-CM: I11.0, I50.20 ICD-9-CM: 402.11, 428.20, 428.0 Bilateral carotid artery disease ([facility]     ICD-10-CM: I77.9 ICD-9-CM: 965. 9 Vitals BP Pulse Height(growth percentile) Weight(growth percentile) SpO2 BMI  
 118/66 64 5' 10\" (1.778 m) 142 lb (64.4 kg) 99% 20.37 kg/m2 Smoking Status Former Smoker Vitals History BMI and BSA Data Body Mass Index Body Surface Area  
 20.37 kg/m 2 1.78 m 2 Preferred Pharmacy Pharmacy Name Phone [facility] [address] Your Updated Medication List  
  
   
This list is accurate as of: 10/5/17 11:41 AM.  Always use your most recent med list.  
  
  
  
  
 amiodarone 200 mg tablet Commonly known as:  CORDARONE Take 1 Tab by mouth daily. Indications: VENTRICULAR RATE CONTROL IN ATRIAL FIBRILLATION  
  
 ascorbic acid (vitamin C) 250 mg tablet Commonly known as:  VITAMIN C Take 1 Tab by mouth daily (with breakfast). aspirin 81 mg chewable tablet Take 1 Tab by mouth daily (with breakfast). atorvastatin 40 mg tablet Commonly known as:  LIPITOR Take 1 Tab by mouth nightly. Indications: DYSLIPIDEMIA  
  
 cholecalciferol 1,000 unit tablet Commonly known as:  VITAMIN D3 Take 1 Tab by mouth daily. cyanocobalamin 1,000 mcg tablet Commonly known as:  VITAMIN B-12 Take 1 Tab by mouth daily. ferrous sulfate 325 mg (65 mg iron) tablet Take 1 Tab by mouth daily (with breakfast). furosemide 20 mg tablet Commonly known as:  LASIX  
1 tablet daily  Indications: Pulmonary Edema due to Chronic Heart Failure  
  
 gabapentin 300 mg capsule Commonly known as:  NEURONTIN Take 1 Cap by mouth three (3) times daily. Indications: NEUROPATHIC PAIN  
  
 HEPARIN FLUSH IV  
3 mL by IntraVENous route two (2) times a week. losartan 25 mg tablet Commonly known as:  COZAAR Take 2 Tabs by mouth daily. Indications: Chronic Heart Failure, hypertension  
  
 magnesium oxide 400 mg tablet Commonly known as:  MAG-OX Take 1 Tab by mouth daily. metoprolol tartrate 25 mg tablet Commonly known as:  LOPRESSOR Take 1 Tab by mouth every twelve (12) hours. Indications: hypertension, VENTRICULAR RATE CONTROL IN ATRIAL FIBRILLATION  
  
 multivitamin tablet Commonly known as:  ONE A DAY Take 1 Tab by mouth daily. NORMAL SALINE FLUSH INJECTION 10 mL by IntraVENous route daily. PICC line patency  
  
 oxyCODONE-acetaminophen 5-325 mg per tablet Commonly known as:  PERCOCET Take 1 Tab by mouth every four (4) hours as needed (for moderate to severe pain). Max Daily Amount: 6 Tabs. Indications: Pain PLAVIX 75 mg Tab Generic drug:  clopidogrel Take 75 mg by mouth daily. potassium chloride 20 mEq packet Commonly known as:  KLOR-CON Take 1 Packet by mouth daily. [while on Furosemide]  Indications: hypokalemia prevention  
  
 rivaroxaban 20 mg Tab tablet Commonly known as:  [place] Take 1 Tab by mouth daily (with breakfast). VASCULAR CATHETER: [facility] AND PICC FLUSH PANEL 10 mL by IntraVENous route daily. zinc sulfate 220 (50) mg capsule Commonly known as:  ZINCATE Take 1 Cap by mouth daily. Prescriptions Printed Refills  
 rivaroxaban (XARELTO) 20 mg tab tablet 6 Sig: Take 1 Tab by mouth daily (with breakfast). Class: Print Route: Oral  
  
We Performed the Following AMB POC EKG ROUTINE W/ 12 LEADS, INTER & REP [59655 CPT(R)] To-Do List   
 10/05/2017 ECHO:  2D ECHO COMPLETE ADULT (TTE) W OR WO CONTR   
  
 10/05/2017 12:00 PM  
  Appointment with Mercy Young PTA at [address] [facility] SCHEDULING/INTAKE  
  
 10/06/2017 To Be Determined   Appointment with Funmi Martin RN at [address] [facility] [address] [facility] CARE SCHEDULING/INTAKE  
  
 10/09/2017 To Be Determined Appointment with Elayne Romo RN at [address] [place] [address] [facility]  
  
 10/11/2017 To Be Determined Appointment with Deepak Live PTA at [address] [place] [address] [facility]  
  
 10/11/2017 To Be Determined Appointment with Elayne Romo RN at [address] [place] [address] [facility]  
  
 10/13/2017 To Be Determined Appointment with Deepak Live PTA at [address] [place] [address] [facility]  
  
 10/13/2017 To Be Determined Appointment with Elayne Romo RN at [address] [place] [address] SCHEDULING/INTAKE  
  
 10/20/2017 8:00 AM  
  Appointment with [facility] MACHINE (WT ) at [facility] NON-INVASIVE CARD ([phone]) Age Limit for ALL Heart procedures @ [facility] facilities: 18 yrs and older only. Under the age of 25, refer to [facility] ([phone]). Wt Limit: 350lbs. This study requires patient to bring a written physician's order or MD office may fax the order to Central Scheduling at [phone]. Patient needs to bring a current list of all medications. No preparation is required for this study. Patients should report 15 minutes prior to their appointment time to the [facility], [address] [place] [address]/[address]. Please provide this summary of care documentation to your next provider. Your primary care clinician is listed as Gary Hastings. If you have any questions after today's visit, please call [phone].

## 2017-10-05 NOTE — PROGRESS NOTES
PATIENT NAME: Saritha November         59 y.o.      1960              DATE:10/5/2017    REASON FOR VISIT: Coronary artery disease, ischemic cardiomyopathy, atrial fibrillation    HISTORY OF PRESENT ILLNESS: Status post left BKA since last visit. He had a fall and developed a hematoma on the stump. Coumadin has been discontinued. Has a history of atrial fibrillation. Has a history of hypertension and ischemic cardiomyopathy. Status post PTCA and stenting of her right coronary artery lesion. Denies chest pain and other symptoms suggestive of angina. Denies STEWART, PND, orthopnea. Denies palpitation, syncope, presyncope. Denies edema in the lower extremities. PAST MEDICAL HISTORY:   Past Medical History:  4/4/2017: Acute blood loss as cause of postoperative ane*  No date: Anticoagulated on Coumadin  1/25/2017: Aortoiliac occlusive disease ([address]  1/25/2017: Atherosclerosis of native artery of both lower*  1/26/2015: Benign hypertensive heart disease with systoli*  No date: Carotid artery disease (HCC)  No date: Chronic anemia  No date: Chronic systolic heart failure ([address]  4/4/2017: Chronic ulcer of right foot ([address]  8/8/2017: Chronic ulcer of right heel ([address]  3/15/2017: Coronary artery disease involving native coron*      Comment: Successful stenting of Cx (Xience LESLEY) and RCA               (Xience LESLEY) to 0% by Dr. Rios Peoples on 3/15/17.  1/26/2015: DDD (degenerative disc disease), lumbar  No date: Dyslipidemia  7/5/2016: Erectile dysfunction  4/6/2017: Euthyroid sick syndrome  11/15/2016: Hereditary peripheral neuropathy  4/18/2017: History of cardioversion      Comment: S/P Synchronized external cardioversion                (4/18/2017 - Dr. Ila Nguyen)  4/22/2017:  History of vitamin D deficiency      Comment: Vitamin D 25-Hydroxy (4/22/2017) = 12.0;                Vitamin D 25-Hydroxy (5/26/2017) = 38.7  7/24/2017: Infection of vascular bypass graft (HCC)      Comment: Left lower extremity  No date: Ischemic cardiomyopathy  7/23/2017:  Moderate to severe pulmonary hypertension  No date: Paroxysmal atrial fibrillation ([facility]  1/25/2017: Peripheral artery disease ([facility]  No date: Skin ulcer of malleolar area of right ankle (H*  5/4/2015: Spinal stenosis of lumbar region with radiculo*      Comment: Dr. Tra Osborn:   Past Surgical History:  3/8/2017: CARDIAC CATHETERIZATION      Comment:    3/15/2017: CARDIAC CATHETERIZATION      Comment:    3/15/2017: CORONARY STENT SINGLE W/PTCA      Comment:    08/18/2017: HX ABOVE KNEE AMPUTATION Left      Comment: S/P Left above-the-knee amputation (8/18/2017                - Dr. Suzan Hannah)  06/15/2017: HX ATHERECTOMY Left      Comment: S/P Atherectomy and balloon angioplasty of the               left superficial femoral artery and above-knee                popliteal artery (6/15/2017 - Dr. Michelet Hebert)  09/07/2017: HX ATHERECTOMY Right      Comment: S/P Atherectomy and balloon angioplasty of the               below-the-knee popliteal artery, above-the-knee               popliteal artery, tibioperoneal trunk artery                and posterior tibial artery (9/7/2017 - Dr. Suzan Hannah)  07/23/2015: HX COLONOSCOPY      Comment: polyps repeat 2020 5 years Hoang Henderson  04/04/2017: HX FEMORAL BYPASS Right      Comment: S/P Right axillary to bifemoral artery bypass                using an 8 mm Propaten graft from the right                axilla to the right common femoral artery with                a jump femoral-femoral bypass with another 8 mm               Propaten external ring bypass; Right common                femoral artery, and profunda femoris artery and               superficial femoral artery endarterectomy                causing an extensive surgery on the right side                (4/4/2017 - Dr. Sergio Kessler)  04/07/2017: HX FEMORAL BYPASS Right      Comment: S/P Cutdown of femoral-femoral bypass, more on               the left groin side; Right lower extremity                angiography with first-order catheterization                (4/7/2017 - Dr. Leo Farfan)  04/10/2017: HX FEMORAL BYPASS Right      Comment: S/P Right femoral to above-knee popliteal                bypass with an 8 mm PTFE graft (4/10/2017 - Dr. Kaylyn Matthew)  07/25/2017: HX OTHER SURGICAL Left      Comment: S/P Removal of infected graft; Repair of left                superficial femoral artery; Repair of left                common femoral artery (7/25/2017 - Dr. Venu Jones)  06/27/2017: HX OTHER SURGICAL Right      Comment: S/P Drainage of right side abscess (6/27/2017                - Dr. Leo Farfan)  07/01/2017: HX OTHER SURGICAL Left      Comment: S/P Left groin exploration; Left femoral                repair pseudoaneurysm; Left wound washout; Wound VAC placement (7/01/2017 - Dr. Venu Jones)  07/04/2017: HX OTHER SURGICAL Left      Comment: S/P Left common femoral artery ligation; Jump                bypass from right to left fem-fem to a more                distal superficial femoral artery using 8 mm                external ringed Propaten graft; Left groin                wound exploration and washout (7/4/2017 - Dr. Leo Farfan)  08/18/2017: HX OTHER SURGICAL Right      Comment: S/P Right axillary femoral jump bypass                interposition; Removal of infected right                axillary femoral bypass;  Wound VAC placement of               upper thigh 1.2 cm x 5.2 cm x 1.8 cm and groin                4 cm x 0.5 cm x 0.2 cm (8/18/2017 - Dr. Venu Jones)      SOCIAL HISTORY:  Social History    Marital status: LEGALLY    Spouse name:                       Years of education:                 Number of children:               Social History Main Topics    Smoking status: Former Smoker                                                                Packs/day: 0.00      Years: 0.00        Smokeless status: Never Used                        Comment: quit in 2011    Alcohol use: Yes           1.2 oz/week       2 Cans of beer per week       Comment: 10 cans of beer a month    Drug use: Yes                Special: Marijuana       Comment: occasionally-last used 4/17    Sexual activity: Yes               Partners with: Female        ALLERGIES:    -- Morphine -- Other (comments)    --  Patient gets confused with morphine. CURRENT MEDICATIONS:   Current Outpatient Prescriptions:  multivitamin (ONE A DAY) tablet, Take 1 Tab by mouth daily. rivaroxaban (XARELTO) 20 mg tab tablet, Take 1 Tab by mouth daily (with breakfast). oxyCODONE-acetaminophen (PERCOCET) 5-325 mg per tablet, Take 1 Tab by mouth every four (4) hours as needed (for moderate to severe pain). Max Daily Amount: 6 Tabs. Indications: Pain  gabapentin (NEURONTIN) 300 mg capsule, Take 1 Cap by mouth three (3) times daily. Indications: NEUROPATHIC PAIN  VASCULAR CATHETER: [facility] AND PICC FLUSH PANEL, 10 mL by IntraVENous route daily. HEPARIN SOD,PORCINE/0.9 % NACL (HEPARIN FLUSH IV), 3 mL by IntraVENous route two (2) times a week. 0.9 % SODIUM CHLORIDE (NORMAL SALINE FLUSH INJECTION), 10 mL by IntraVENous route daily. PICC line patency  furosemide (LASIX) 20 mg tablet, 1 tablet daily  Indications: Pulmonary Edema due to Chronic Heart Failure  losartan (COZAAR) 25 mg tablet, Take 2 Tabs by mouth daily. Indications: Chronic Heart Failure, hypertension  magnesium oxide (MAG-OX) 400 mg tablet, Take 1 Tab by mouth daily. potassium chloride (KLOR-CON) 20 mEq packet, Take 1 Packet by mouth daily. [while on Furosemide]  Indications: hypokalemia prevention  clopidogrel (PLAVIX) 75 mg tab, Take 75 mg by mouth daily. metoprolol tartrate (LOPRESSOR) 25 mg tablet, Take 1 Tab by mouth every twelve (12) hours. Indications: hypertension, VENTRICULAR RATE CONTROL IN ATRIAL FIBRILLATION  amiodarone (CORDARONE) 200 mg tablet, Take 1 Tab by mouth daily. Indications: VENTRICULAR RATE CONTROL IN ATRIAL FIBRILLATION  aspirin 81 mg chewable tablet, Take 1 Tab by mouth daily (with breakfast). cholecalciferol (VITAMIN D3) 1,000 unit tablet, Take 1 Tab by mouth daily. ascorbic acid, vitamin C, (VITAMIN C) 250 mg tablet, Take 1 Tab by mouth daily (with breakfast). zinc sulfate (ZINCATE) 220 (50) mg capsule, Take 1 Cap by mouth daily. ferrous sulfate 325 mg (65 mg iron) tablet, Take 1 Tab by mouth daily (with breakfast). atorvastatin (LIPITOR) 40 mg tablet, Take 1 Tab by mouth nightly. Indications: DYSLIPIDEMIA  cyanocobalamin (VITAMIN B-12) 1,000 mcg tablet, Take 1 Tab by mouth daily. No current facility-administered medications for this visit. REVIEW of SYSTEMS:History obtained from chart review and the patient  General ROS: negative for - weight gain or weight loss  Hematological and Lymphatic ROS: negative for - bleeding problems  Respiratory ROS: no cough, shortness of breath, or wheezing  Cardiovascular ROS: See history of present illness  Musculoskeletal ROS: Left BKA     PHYSICAL EXAMINATION:   /66  Pulse 64  Ht 5' 10\" (1.778 m)  Wt 64.4 kg (142 lb)  SpO2 99%  BMI 20.37 kg/m2  BP Readings from Last 3 Encounters:  10/05/17 : 118/66  10/03/17 : 120/62  10/02/17 : 105/56    Pulse Readings from Last 3 Encounters:  10/05/17 : 64  10/03/17 : 62  10/02/17 : 71    Wt Readings from Last 3 Encounters:  10/05/17 : 64.4 kg (142 lb)  09/27/17 : 61.7 kg (136 lb)  09/19/17 : 61.9 kg (136 lb 8 oz)    General: Well-developed white male in no apparent distress. HEENT: Sclera clear. Mucous membranes pink and moist.  Neck: No jugular venous distention. Carotid upstrokes 2+ without bruits. Chest: Clear to  auscultation. Heart: PMI not palpable. Regular rhythm. No murmur or gallop.   Abdomen: Nontender without masses or organomegaly. Extremities:  Left BKA. No edema right lower extremity. Skin: Warm and dry. No stasis changes. Neuro: Alert, oriented, speech WNL, no facial asymmetry. .    EKG: Within normal limits    IMPRESSION:   Coronary artery disease. No angina status post PTCA of her right coronary artery lesion  History of ischemic cardiomyopathy with ejection fraction 40%  Atrial fibrillation requiring DC cardioversion. Presently in sinus rhythm on amiodarone  Hypertension, controlled    PLAN:  Xarelto 20 mg p.o. daily. Basic metabolic panel was recently checked and his renal function was normal.  Echocardiography  Return in 1 month    The diagnoses and plan were discussed with patient and caregiver. All questions answered. Plan of care agreed to by all concerned. Keshav Ritter.  MD Zack       ,

## 2017-10-06 ENCOUNTER — HOME CARE VISIT (OUTPATIENT)
Dept: SCHEDULING | Facility: HOME HEALTH | Age: 57
End: 2017-10-06
Payer: COMMERCIAL

## 2017-10-06 PROCEDURE — G0299 HHS/HOSPICE OF RN EA 15 MIN: HCPCS

## 2017-10-07 ENCOUNTER — HOSPITAL ENCOUNTER (EMERGENCY)
Age: 57
Discharge: HOME | End: 2017-10-08
Attending: EMERGENCY MEDICINE
Payer: COMMERCIAL

## 2017-10-07 DIAGNOSIS — Z51.89 ENCOUNTER FOR WOUND CARE: ICD-10-CM
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
## 2017-10-07 PROCEDURE — 99283 EMERGENCY DEPT VISIT LOW MDM: CPT

## 2017-10-07 RX ORDER — LORAZEPAM 2 MG/ML
1 INJECTION INTRAMUSCULAR SEE ADMIN INSTRUCTIONS
Status: DISCONTINUED | OUTPATIENT
Start: 2017-10-07 | End: 2017-10-08

## 2017-10-08 VITALS
HEART RATE: 66 BPM | TEMPERATURE: 97.9 F | HEIGHT: 70 IN | DIASTOLIC BLOOD PRESSURE: 70 MMHG | WEIGHT: 143 LBS | SYSTOLIC BLOOD PRESSURE: 131 MMHG | RESPIRATION RATE: 16 BRPM | OXYGEN SATURATION: 98 % | BODY MASS INDEX: 20.47 KG/M2

## 2017-10-08 VITALS
SYSTOLIC BLOOD PRESSURE: 140 MMHG | TEMPERATURE: 98.3 F | OXYGEN SATURATION: 96 % | RESPIRATION RATE: 18 BRPM | DIASTOLIC BLOOD PRESSURE: 81 MMHG | HEART RATE: 61 BPM

## 2017-10-08 LAB
ALBUMIN SERPL-MCNC: 3.2 G/DL (ref 3.4–5)
ALBUMIN/GLOB SERPL: 0.7 {RATIO} (ref 0.8–1.7)
ALP SERPL-CCNC: 362 U/L (ref 45–117)
ALT SERPL-CCNC: 64 U/L (ref 16–61)
ANION GAP SERPL CALC-SCNC: 7 MMOL/L (ref 3–18)
AST SERPL-CCNC: 46 U/L (ref 15–37)
BASOPHILS # BLD: 0 K/UL (ref 0–0.1)
BASOPHILS NFR BLD: 0 % (ref 0–2)
BILIRUB SERPL-MCNC: 0.2 MG/DL (ref 0.2–1)
BUN SERPL-MCNC: 29 MG/DL (ref 7–18)
BUN/CREAT SERPL: 25 (ref 12–20)
CALCIUM SERPL-MCNC: 8.8 MG/DL (ref 8.5–10.1)
CHLORIDE SERPL-SCNC: 104 MMOL/L (ref 100–108)
CO2 SERPL-SCNC: 28 MMOL/L (ref 21–32)
CREAT SERPL-MCNC: 1.16 MG/DL (ref 0.6–1.3)
DIFFERENTIAL METHOD BLD: ABNORMAL
EOSINOPHIL # BLD: 0.2 K/UL (ref 0–0.4)
EOSINOPHIL NFR BLD: 2 % (ref 0–5)
ERYTHROCYTE [DISTWIDTH] IN BLOOD BY AUTOMATED COUNT: 16.7 % (ref 11.6–14.5)
GLOBULIN SER CALC-MCNC: 4.8 G/DL (ref 2–4)
GLUCOSE SERPL-MCNC: 101 MG/DL (ref 74–99)
HCT VFR BLD AUTO: 32.6 % (ref 36–48)
HGB BLD-MCNC: 10.7 G/DL (ref 13–16)
INR PPP: 1.6 (ref 0.8–1.2)
LYMPHOCYTES # BLD: 3.1 K/UL (ref 0.9–3.6)
LYMPHOCYTES NFR BLD: 28 % (ref 21–52)
MCH RBC QN AUTO: 28.6 PG (ref 24–34)
MCHC RBC AUTO-ENTMCNC: 32.8 G/DL (ref 31–37)
MCV RBC AUTO: 87.2 FL (ref 74–97)
MONOCYTES # BLD: 1.2 K/UL (ref 0.05–1.2)
MONOCYTES NFR BLD: 11 % (ref 3–10)
NEUTS SEG # BLD: 6.3 K/UL (ref 1.8–8)
NEUTS SEG NFR BLD: 59 % (ref 40–73)
PLATELET # BLD AUTO: 370 K/UL (ref 135–420)
PMV BLD AUTO: 9.1 FL (ref 9.2–11.8)
POTASSIUM SERPL-SCNC: 4 MMOL/L (ref 3.5–5.5)
PROT SERPL-MCNC: 8 G/DL (ref 6.4–8.2)
PROTHROMBIN TIME: 18.2 SEC (ref 11.5–15.2)
RBC # BLD AUTO: 3.74 M/UL (ref 4.7–5.5)
SODIUM SERPL-SCNC: 139 MMOL/L (ref 136–145)
WBC # BLD AUTO: 10.8 K/UL (ref 4.6–13.2)

## 2017-10-08 PROCEDURE — 80053 COMPREHEN METABOLIC PANEL: CPT | Performed by: NURSE PRACTITIONER

## 2017-10-08 PROCEDURE — 74011250637 HC RX REV CODE- 250/637: Performed by: NURSE PRACTITIONER

## 2017-10-08 PROCEDURE — 85025 COMPLETE CBC W/AUTO DIFF WBC: CPT | Performed by: NURSE PRACTITIONER

## 2017-10-08 PROCEDURE — 85610 PROTHROMBIN TIME: CPT | Performed by: NURSE PRACTITIONER

## 2017-10-08 RX ORDER — OXYCODONE AND ACETAMINOPHEN 5; 325 MG/1; MG/1
2 TABLET ORAL
Status: COMPLETED | OUTPATIENT
Start: 2017-10-08 | End: 2017-10-08

## 2017-10-08 RX ADMIN — OXYCODONE HYDROCHLORIDE AND ACETAMINOPHEN 2 TABLET: 5; 325 TABLET ORAL at 01:57

## 2017-10-08 NOTE — ED TRIAGE NOTES
Pt arrived to ED after a fall this evening. Pt states that he had left [facility] one month ago and today pt fell and tried to catch himself, is now having bleeding from wound vac site. Pt placed gauze and taped wound.

## 2017-10-08 NOTE — ED NOTES
I have reviewed discharge instructions with the patient. The patient verbalized understanding. Pt is AxOx4, NAD, V/S stable. Pt wheeled himself out by wheelchair, accompanied by his children.

## 2017-10-08 NOTE — DISCHARGE INSTRUCTIONS

## 2017-10-08 NOTE — ED PROVIDER NOTES
HPI     Pt with a history of an AKA one month ago with a wound vac to the stump site presents after he slipped and caught himself and states he did not hit his stump but it was ilda. Pt states it started bleeding afterwards under the tape and around the wound vac site. No injury reported to the stump states that he just moved to catch himself and ilda his stump. He is on xarelto. No other symptoms reported. No fever. States he has home health come and change his wound vac M-W-F. States Dr. Julian Ness is his surgeon and is on call, told him to come in an have it checked. Past Medical History:   Diagnosis Date    Acute blood loss as cause of postoperative anemia 4/4/2017    Anticoagulated on Coumadin     Aortoiliac occlusive disease (HCC) 1/25/2017    Atherosclerosis of native artery of both lower extremities with intermittent claudication ([address] 1/25/2017    Benign hypertensive heart disease with systolic congestive heart failure, NYHA class 2 ([address] 1/26/2015    Carotid artery disease (HCC)     Chronic anemia     Chronic systolic heart failure (HCC)     Chronic ulcer of right foot ([address] 4/4/2017    Chronic ulcer of right heel ([address] 8/8/2017    Coronary artery disease involving native coronary artery of native heart 3/15/2017    Successful stenting of Cx (Xience LESLEY) and RCA (Xience LESLEY) to 0% by Dr. Priya Clay on 3/15/17.     DDD (degenerative disc disease), lumbar 1/26/2015    Dyslipidemia     Erectile dysfunction 7/5/2016    Euthyroid sick syndrome 4/6/2017    Hereditary peripheral neuropathy 11/15/2016    History of cardioversion 4/18/2017    S/P Synchronized external cardioversion (4/18/2017 - Dr. Melecio Avery)    History of vitamin D deficiency 4/22/2017    Vitamin D 25-Hydroxy (4/22/2017) = 12.0; Vitamin D 25-Hydroxy (5/26/2017) = 38.7    Infection of vascular bypass graft ([address] 7/24/2017    Left lower extremity    Ischemic cardiomyopathy     Moderate to severe pulmonary hypertension 7/23/2017    Paroxysmal atrial fibrillation ([facility]     Peripheral artery disease ([facility] 1/25/2017    Skin ulcer of malleolar area of right ankle ([facility]     Spinal stenosis of lumbar region with radiculopathy 5/4/2015    Dr. Sourav Thibodeaux       Past Surgical History:   Procedure Laterality Date    CARDIAC CATHETERIZATION  3/8/2017         CARDIAC CATHETERIZATION  3/15/2017         CORONARY STENT SINGLE W/PTCA  3/15/2017         HX ABOVE KNEE AMPUTATION Left 08/18/2017    S/P Left above-the-knee amputation (8/18/2017 - Dr. Patricia Garibay)    HX ATHERECTOMY Left 06/15/2017    S/P Atherectomy and balloon angioplasty of the left superficial femoral artery and above-knee popliteal artery (6/15/2017 - Dr. Patricia Garibay)    HX ATHERECTOMY Right 09/07/2017    S/P Atherectomy and balloon angioplasty of the below-the-knee popliteal artery, above-the-knee popliteal artery, tibioperoneal trunk artery and posterior tibial artery (9/7/2017 - Dr. Patricia Garibay)    HX COLONOSCOPY  07/23/2015    polyps repeat 2020 5 years [address] Right 04/04/2017    S/P Right axillary to bifemoral artery bypass using an 8 mm Propaten graft from the right axilla to the right common femoral artery with a jump femoral-femoral bypass with another 8 mm Propaten external ring bypass; Right common femoral artery, and profunda femoris artery and superficial femoral artery endarterectomy causing an extensive surgery on the right side (4/4/2017 - Dr. Tomeka Corona)    [address] Right 04/07/2017    S/P Cutdown of femoral-femoral bypass, more on the left groin side; Right lower extremity angiography with first-order catheterization (4/7/2017 - Dr. Patricia Garibay)    HX FEMORAL BYPASS Right 04/10/2017    S/P Right femoral to above-knee popliteal bypass with an 8 mm PTFE graft (4/10/2017 - Dr. Amrita Nick)    HX OTHER SURGICAL Left 07/25/2017    S/P Removal of infected graft; Repair of left superficial femoral artery; Repair of left common femoral artery (7/25/2017 - Dr. Mary Jo Matute)    HX OTHER SURGICAL Right 06/27/2017    S/P Drainage of right side abscess (6/27/2017 - Dr. Mary Jo Matute)    HX OTHER SURGICAL Left 07/01/2017    S/P Left groin exploration; Left femoral repair pseudoaneurysm; Left wound washout; Wound VAC placement (7/01/2017 - Dr. Mary Jo Matute)    HX OTHER SURGICAL Left 07/04/2017    S/P Left common femoral artery ligation; Jump bypass from right to left fem-fem to a more distal superficial femoral artery using 8 mm external ringed Propaten graft; Left groin wound exploration and washout (7/4/2017 - Dr. Mary Jo Matute)    HX OTHER SURGICAL Right 08/18/2017    S/P Right axillary femoral jump bypass interposition; Removal of infected right axillary femoral bypass; Wound VAC placement of upper thigh 1.2 cm x 5.2 cm x 1.8 cm and groin 4 cm x 0.5 cm x 0.2 cm (8/18/2017 - Dr. Mary Jo Matute)         Family History:   Problem Relation Age of Onset    Heart Disease Father        Social History     Social History    Marital status: LEGALLY      Spouse name: N/A    Number of children: N/A    Years of education: N/A     Occupational History    Not on file. Social History Main Topics    Smoking status: Former Smoker    Smokeless tobacco: Never Used      Comment: quit in 2011    Alcohol use 1.2 oz/week     2 Cans of beer per week      Comment: 10 cans of beer a month    Drug use: Yes     Special: Marijuana      Comment: occasionally-last used 4/17    Sexual activity: Yes     Partners: Female     Other Topics Concern    Not on file     Social History Narrative         ALLERGIES: Morphine    Review of Systems   Skin:        Bleeding to stump under dressing   All other systems reviewed and are negative.       Vitals:    10/07/17 2209   BP: 154/78   Pulse: 68   Resp: 14   Temp: 98 °F (36.7 °C)   SpO2: 99%   Weight: 64.9 kg (143 lb)   Height: 5' 10\" (1.778 m)            Physical Exam   Constitutional: He is oriented to person, place, and time. He appears well-developed and well-nourished. HENT:   Head: Normocephalic and atraumatic. Eyes: Conjunctivae and EOM are normal. Pupils are equal, round, and reactive to light. Neck: Normal range of motion. Neck supple. Cardiovascular: Normal rate and regular rhythm. Pulmonary/Chest: Effort normal and breath sounds normal.   Abdominal: Soft. Bowel sounds are normal.   Trunk with arterial bypass graft palpable under skin anteriorly   Musculoskeletal: Normal range of motion. Neurological: He is alert and oriented to person, place, and time. He has normal reflexes. Skin: Skin is warm and dry. Bleeding noted under clear dressing to stump on left AKA. No erythema or sign of infection, no hemorrhage. Psychiatric: He has a normal mood and affect. His behavior is normal. Judgment and thought content normal.   Nursing note and vitals reviewed. MDM  Number of Diagnoses or Management Options  Encounter for wound care: established and improving  Fall, initial encounter: established and improving  Diagnosis management comments: Labs and coags checked, within normal for pt.   Wound vac changed, no hemorrhage pt to be discharged and f/u as ordered with pcp and vascular    Risk of Complications, Morbidity, and/or Mortality  Presenting problems: low  Diagnostic procedures: low  Management options: low      ED Course       Procedures            Vitals:  Patient Vitals for the past 12 hrs:   Temp Pulse Resp BP SpO2   10/07/17 2209 98 °F (36.7 °C) 68 14 154/78 99 %       Medications ordered:   Medications   oxyCODONE-acetaminophen (PERCOCET) 5-325 mg per tablet 2 Tab (2 Tabs Oral Given 10/8/17 0157)         Lab findings:  Recent Results (from the past 12 hour(s))   CBC WITH AUTOMATED DIFF    Collection Time: 10/08/17 12:15 AM   Result Value Ref Range    WBC 10.8 4.6 - 13.2 K/uL    RBC 3.74 (L) 4.70 - 5.50 M/uL    HGB 10.7 (L) 13.0 - 16.0 g/dL    HCT 32.6 (L) 36.0 - 48.0 %    MCV 87.2 74.0 - 97.0 FL    MCH 28.6 24.0 - 34.0 PG    MCHC 32.8 31.0 - 37.0 g/dL    RDW 16.7 (H) 11.6 - 14.5 %    PLATELET 243 922 - 821 K/uL    MPV 9.1 (L) 9.2 - 11.8 FL    NEUTROPHILS 59 40 - 73 %    LYMPHOCYTES 28 21 - 52 %    MONOCYTES 11 (H) 3 - 10 %    EOSINOPHILS 2 0 - 5 %    BASOPHILS 0 0 - 2 %    ABS. NEUTROPHILS 6.3 1.8 - 8.0 K/UL    ABS. LYMPHOCYTES 3.1 0.9 - 3.6 K/UL    ABS. MONOCYTES 1.2 0.05 - 1.2 K/UL    ABS. EOSINOPHILS 0.2 0.0 - 0.4 K/UL    ABS. BASOPHILS 0.0 0.0 - 0.1 K/UL    DF AUTOMATED     PROTHROMBIN TIME + INR    Collection Time: 10/08/17 12:15 AM   Result Value Ref Range    Prothrombin time 18.2 (H) 11.5 - 15.2 sec    INR 1.6 (H) 0.8 - 1.2     METABOLIC PANEL, COMPREHENSIVE    Collection Time: 10/08/17 12:15 AM   Result Value Ref Range    Sodium 139 136 - 145 mmol/L    Potassium 4.0 3.5 - 5.5 mmol/L    Chloride 104 100 - 108 mmol/L    CO2 28 21 - 32 mmol/L    Anion gap 7 3.0 - 18 mmol/L    Glucose 101 (H) 74 - 99 mg/dL    BUN 29 (H) 7.0 - 18 MG/DL    Creatinine 1.16 0.6 - 1.3 MG/DL    BUN/Creatinine ratio 25 (H) 12 - 20      GFR est AA >60 >60 ml/min/1.73m2    GFR est non-AA >60 >60 ml/min/1.73m2    Calcium 8.8 8.5 - 10.1 MG/DL    Bilirubin, total 0.2 0.2 - 1.0 MG/DL    ALT (SGPT) 64 (H) 16 - 61 U/L    AST (SGOT) 46 (H) 15 - 37 U/L    Alk. phosphatase 362 (H) 45 - 117 U/L    Protein, total 8.0 6.4 - 8.2 g/dL    Albumin 3.2 (L) 3.4 - 5.0 g/dL    Globulin 4.8 (H) 2.0 - 4.0 g/dL    A-G Ratio 0.7 (L) 0.8 - 1.7               Progress notes, Consult notes or additional Procedure notes:   Consulted with Dr. Karla Lau with vascular surgery, and discussed pts labs and changing the wound vac apparatus after his bleeding episode. He stated to go ahead and change out the wound vac and have pt continue treatment as ordered. Reevaluation of patient:   I have reassessed the patient.   Patient is feeling better and is asking to go home  Disposition:    Diagnosis:   1. Fall, initial encounter    2. Encounter for wound care        Disposition: to Home    Follow-up Information     Follow up With Details Comments [id number] Jesus Hagen MD In 1 day  [address] [place] [address]  [address]  [address] [facility]  [phone]             Patient's Medications   Start Taking    No medications on file   Continue Taking    0.9 % SODIUM CHLORIDE (NORMAL SALINE FLUSH INJECTION)    10 mL by IntraVENous route daily. PICC line patency    AMIODARONE (CORDARONE) 200 MG TABLET    Take 1 Tab by mouth daily. Indications: VENTRICULAR RATE CONTROL IN ATRIAL FIBRILLATION    ASCORBIC ACID, VITAMIN C, (VITAMIN C) 250 MG TABLET    Take 1 Tab by mouth daily (with breakfast). ASPIRIN 81 MG CHEWABLE TABLET    Take 1 Tab by mouth daily (with breakfast). ATORVASTATIN (LIPITOR) 40 MG TABLET    Take 1 Tab by mouth nightly. Indications: DYSLIPIDEMIA    CHOLECALCIFEROL (VITAMIN D3) 1,000 UNIT TABLET    Take 1 Tab by mouth daily. CLOPIDOGREL (PLAVIX) 75 MG TAB    Take 75 mg by mouth daily. CYANOCOBALAMIN (VITAMIN B-12) 1,000 MCG TABLET    Take 1 Tab by mouth daily. FERROUS SULFATE 325 MG (65 MG IRON) TABLET    Take 1 Tab by mouth daily (with breakfast). FUROSEMIDE (LASIX) 20 MG TABLET    1 tablet daily  Indications: Pulmonary Edema due to Chronic Heart Failure    GABAPENTIN (NEURONTIN) 300 MG CAPSULE    Take 1 Cap by mouth three (3) times daily. Indications: NEUROPATHIC PAIN    HEPARIN SOD,PORCINE/0.9 % NACL (HEPARIN FLUSH IV)    3 mL by IntraVENous route two (2) times a week. LOSARTAN (COZAAR) 25 MG TABLET    Take 2 Tabs by mouth daily. Indications: Chronic Heart Failure, hypertension    MAGNESIUM OXIDE (MAG-OX) 400 MG TABLET    Take 1 Tab by mouth daily. METOPROLOL TARTRATE (LOPRESSOR) 25 MG TABLET    Take 1 Tab by mouth every twelve (12) hours.  Indications: hypertension, VENTRICULAR RATE CONTROL IN ATRIAL FIBRILLATION MULTIVITAMIN (ONE A DAY) TABLET    Take 1 Tab by mouth daily. OXYCODONE-ACETAMINOPHEN (PERCOCET) 5-325 MG PER TABLET    Take 1 Tab by mouth every four (4) hours as needed (for moderate to severe pain). Max Daily Amount: 6 Tabs. Indications: Pain    POTASSIUM CHLORIDE (KLOR-CON) 20 MEQ PACKET    Take 1 Packet by mouth daily. [while on Furosemide]  Indications: hypokalemia prevention    RIVAROXABAN (XARELTO) 20 MG TAB TABLET    Take 1 Tab by mouth daily (with breakfast). VASCULAR CATHETER: MLC AND PICC FLUSH PANEL    10 mL by IntraVENous route daily. ZINC SULFATE (ZINCATE) 220 (50) MG CAPSULE    Take 1 Cap by mouth daily. These Medications have changed    No medications on file   Stop Taking    No medications on file       Return to the ER if you are unable to obtain referral as directed. Kelin Schmidt  results have been reviewed with him. He has been counseled regarding his diagnosis, treatment, and plan. He verbally conveys understanding and agreement of the signs, symptoms, diagnosis, treatment and prognosis and additionally agrees to follow up as discussed. He also agrees with the care-plan and conveys that all of his questions have been answered. I have also provided discharge instructions for him that include: educational information regarding their diagnosis and treatment, and list of reasons why they would want to return to the ED prior to their follow-up appointment, should his condition change.     Rick HUBBARD

## 2017-10-09 ENCOUNTER — HOME CARE VISIT (OUTPATIENT)
Dept: SCHEDULING | Facility: HOME HEALTH | Age: 57
End: 2017-10-09
Payer: COMMERCIAL

## 2017-10-09 PROCEDURE — G0299 HHS/HOSPICE OF RN EA 15 MIN: HCPCS

## 2017-10-10 ENCOUNTER — HOME CARE VISIT (OUTPATIENT)
Dept: SCHEDULING | Facility: HOME HEALTH | Age: 57
End: 2017-10-10
Payer: COMMERCIAL

## 2017-10-10 VITALS
RESPIRATION RATE: 18 BRPM | OXYGEN SATURATION: 99 % | TEMPERATURE: 98.2 F | HEART RATE: 63 BPM | SYSTOLIC BLOOD PRESSURE: 139 MMHG | DIASTOLIC BLOOD PRESSURE: 79 MMHG

## 2017-10-10 VITALS — SYSTOLIC BLOOD PRESSURE: 120 MMHG | HEART RATE: 67 BPM | DIASTOLIC BLOOD PRESSURE: 70 MMHG | OXYGEN SATURATION: 99 %

## 2017-10-10 PROCEDURE — G0157 HHC PT ASSISTANT EA 15: HCPCS

## 2017-10-11 ENCOUNTER — HOME CARE VISIT (OUTPATIENT)
Dept: HOME HEALTH SERVICES | Facility: HOME HEALTH | Age: 57
End: 2017-10-11
Payer: COMMERCIAL

## 2017-10-11 ENCOUNTER — HOME CARE VISIT (OUTPATIENT)
Dept: SCHEDULING | Facility: HOME HEALTH | Age: 57
End: 2017-10-11
Payer: COMMERCIAL

## 2017-10-11 PROCEDURE — G0299 HHS/HOSPICE OF RN EA 15 MIN: HCPCS

## 2017-10-12 ENCOUNTER — HOME CARE VISIT (OUTPATIENT)
Dept: SCHEDULING | Facility: HOME HEALTH | Age: 57
End: 2017-10-12
Payer: COMMERCIAL

## 2017-10-12 VITALS
SYSTOLIC BLOOD PRESSURE: 110 MMHG | HEART RATE: 67 BPM | OXYGEN SATURATION: 98 % | DIASTOLIC BLOOD PRESSURE: 66 MMHG | TEMPERATURE: 99.2 F | RESPIRATION RATE: 18 BRPM

## 2017-10-12 VITALS
TEMPERATURE: 98.5 F | OXYGEN SATURATION: 99 % | DIASTOLIC BLOOD PRESSURE: 70 MMHG | HEART RATE: 67 BPM | SYSTOLIC BLOOD PRESSURE: 120 MMHG

## 2017-10-12 PROCEDURE — G0157 HHC PT ASSISTANT EA 15: HCPCS

## 2017-10-13 ENCOUNTER — HOME CARE VISIT (OUTPATIENT)
Dept: SCHEDULING | Facility: HOME HEALTH | Age: 57
End: 2017-10-13
Payer: COMMERCIAL

## 2017-10-13 PROCEDURE — G0299 HHS/HOSPICE OF RN EA 15 MIN: HCPCS

## 2017-10-15 VITALS
HEART RATE: 58 BPM | DIASTOLIC BLOOD PRESSURE: 81 MMHG | TEMPERATURE: 98.5 F | RESPIRATION RATE: 18 BRPM | OXYGEN SATURATION: 98 % | SYSTOLIC BLOOD PRESSURE: 141 MMHG

## 2017-10-16 ENCOUNTER — HOME CARE VISIT (OUTPATIENT)
Dept: SCHEDULING | Facility: HOME HEALTH | Age: 57
End: 2017-10-16
Payer: COMMERCIAL

## 2017-10-16 ENCOUNTER — PATIENT OUTREACH (OUTPATIENT)
Dept: FAMILY MEDICINE CLINIC | Age: 57
End: 2017-10-16

## 2017-10-16 PROCEDURE — G0299 HHS/HOSPICE OF RN EA 15 MIN: HCPCS

## 2017-10-16 NOTE — PROGRESS NOTES
Nurse Navigator contacted patient to follw up and close out [place] episode from 8/17/17-9/15/17  for complaint of left AKA. Reintroduced myself and verified patient with 2 identifiers. Pateint stated he was doing really well. Patient has moved with his daughter to her new home which he was quick to make sure I understood was her home not his. He will be moving to a separate home of his own once he gets fitted for a prosthetic per his plans. Patient denies pain infection or any other problems  Patient has been attending all appointments and participating with PT patient has wound Vac at this time but is hoping to get rid of that soon. Nurse Navigator Information given to patient. Episode will be closed at this time.

## 2017-10-17 ENCOUNTER — HOME CARE VISIT (OUTPATIENT)
Dept: SCHEDULING | Facility: HOME HEALTH | Age: 57
End: 2017-10-17
Payer: COMMERCIAL

## 2017-10-17 ENCOUNTER — TELEPHONE (OUTPATIENT)
Dept: FAMILY MEDICINE CLINIC | Age: 57
End: 2017-10-17

## 2017-10-17 ENCOUNTER — HOME CARE VISIT (OUTPATIENT)
Dept: HOME HEALTH SERVICES | Facility: HOME HEALTH | Age: 57
End: 2017-10-17
Payer: COMMERCIAL

## 2017-10-17 VITALS
DIASTOLIC BLOOD PRESSURE: 70 MMHG | TEMPERATURE: 98.2 F | HEART RATE: 72 BPM | OXYGEN SATURATION: 97 % | SYSTOLIC BLOOD PRESSURE: 120 MMHG

## 2017-10-17 PROCEDURE — G0157 HHC PT ASSISTANT EA 15: HCPCS

## 2017-10-17 NOTE — TELEPHONE ENCOUNTER
Pt daughter Zina Corley,  is calling she states there is an  discrepancy with the pt metoprolol tartrate (LOPRESSOR) 25 mg tablet. The order states take one (1) tablet but the bottle state take one half(1/2). Please advise.      Zina Corley number is [phone]

## 2017-10-18 ENCOUNTER — HOME CARE VISIT (OUTPATIENT)
Dept: SCHEDULING | Facility: HOME HEALTH | Age: 57
End: 2017-10-18
Payer: COMMERCIAL

## 2017-10-18 PROCEDURE — G0299 HHS/HOSPICE OF RN EA 15 MIN: HCPCS

## 2017-10-18 RX ORDER — METOPROLOL TARTRATE 25 MG/1
25 TABLET, FILM COATED ORAL EVERY 12 HOURS
Qty: 30 TAB | Refills: 6 | Status: SHIPPED | OUTPATIENT
Start: 2017-10-18 | End: 2017-11-20 | Stop reason: SDUPTHER

## 2017-10-18 NOTE — TELEPHONE ENCOUNTER
Spoke with Tova Shelby (patients daughter on [employer]) she is aware patient should be taking Metoprolol 25 mg 1 tab eery 12 hours.

## 2017-10-19 VITALS — SYSTOLIC BLOOD PRESSURE: 162 MMHG | OXYGEN SATURATION: 98 % | TEMPERATURE: 98.5 F | DIASTOLIC BLOOD PRESSURE: 82 MMHG

## 2017-10-20 ENCOUNTER — OFFICE VISIT (OUTPATIENT)
Dept: FAMILY MEDICINE CLINIC | Age: 57
End: 2017-10-20

## 2017-10-20 ENCOUNTER — HOSPITAL ENCOUNTER (OUTPATIENT)
Dept: NON INVASIVE DIAGNOSTICS | Age: 57
Discharge: HOME | End: 2017-10-20
Payer: COMMERCIAL

## 2017-10-20 ENCOUNTER — HOME CARE VISIT (OUTPATIENT)
Dept: SCHEDULING | Facility: HOME HEALTH | Age: 57
End: 2017-10-20
Payer: COMMERCIAL

## 2017-10-20 VITALS
TEMPERATURE: 97.5 F | RESPIRATION RATE: 16 BRPM | HEART RATE: 61 BPM | HEIGHT: 70 IN | SYSTOLIC BLOOD PRESSURE: 120 MMHG | OXYGEN SATURATION: 99 % | DIASTOLIC BLOOD PRESSURE: 60 MMHG

## 2017-10-20 DIAGNOSIS — K92.1 BLOOD IN STOOL: Primary | ICD-10-CM

## 2017-10-20 DIAGNOSIS — I25.5 ISCHEMIC CARDIOMYOPATHY: Chronic | ICD-10-CM

## 2017-10-20 DIAGNOSIS — I48.91 ATRIAL FIBRILLATION, UNSPECIFIED TYPE (HCC): ICD-10-CM

## 2017-10-20 DIAGNOSIS — I73.9 PAD (PERIPHERAL ARTERY DISEASE) (HCC): ICD-10-CM

## 2017-10-20 PROCEDURE — 93306 TTE W/DOPPLER COMPLETE: CPT

## 2017-10-20 PROCEDURE — G0299 HHS/HOSPICE OF RN EA 15 MIN: HCPCS

## 2017-10-20 NOTE — PROGRESS NOTES
1. Have you been to the ER, urgent care clinic since your last visit? Hospitalized since your last visit? [facility] ED 10/07/17    2. Have you seen or consulted any other health care providers outside of the [address] [place] [address] since your last visit? Include any pap smears or colon screening. Dr. Bradley Ruiz 2-3 weeks ago and had infusion on Monday 10/16/17. Patient did not take Xarelto today since he had rectal bleeding. Patient had IV infusion on 10/16/17 and he bled from needlestick area until 10/17/17. He used a wound seal to stop bleeding. Patient is not sure if he had a pneumonia vaccine. Also, he has not had flu vaccine; waiting on physician recommendations.

## 2017-10-20 NOTE — MR AVS SNAPSHOT
Visit Information Date & Time Provider Department Dept. Phone Encounter #  
 10/20/2017 11:30 AM Payal Marquez, [address] [id number] Your Appointments 10/25/2017 11:30 AM  
Follow Up with [facility] ([facility]) Appt Note: 3 weeks fup no studies 12[address] [facility] [address]  
[phone] 1212 [facility] [address] [facility]  
  
    
 11/9/2017  9:40 AM  
Follow Up with Mahoagny Tom MD  
[facility] \[facility]\"" ([facility]) Appt Note: 1 mth f/up [place] [address] [address] [address] [phone] [place]  
  
    
 11/20/2017  9:00 AM  
ROUTINE CARE with Leticia Rucker MD  
[facility] ([facility]) Appt Note: routine f/u 2mo [address]  
[address] [facility] [address] [facility]  
  
    
 1/18/2018 10:00 AM  
ROUTINE CARE with Leticia Rucker MD  
[facility] ([facility]) Appt Note: routine f/u 6mo [address] [facility] [address]  
[phone] Upcoming Health Maintenance Date Due COLONOSCOPY 7/23/2020 DTaP/Tdap/Td series (2 - Td) 7/5/2026 Allergies as of 10/20/2017  Review Complete On: 10/20/2017 By: Payal Marquez MD  
  
 Severity Noted Reaction Type Reactions Morphine  08/03/2017   Side Effect Other (comments) Patient gets confused with morphine. Current Immunizations  Reviewed on 10/20/2017 Name Date Tdap 7/5/2016 Reviewed by Payal Marquez MD on 10/20/2017 at 12:26 PM  
You Were Diagnosed With   
  
 Codes Comments Blood in stool    -  Primary ICD-10-CM: K92.1 ICD-9-CM: 578.1 Atrial fibrillation, unspecified type ([facility]     ICD-10-CM: I48.91 
ICD-9-CM: 427.31   
 PAD (peripheral artery disease) ([facility])     ICD-10-CM: I73.9 ICD-9-CM: 443. 9 Vitals BP Pulse Temp Resp Height(growth percentile) SpO2  
 120/60 (BP 1 Location: Left arm, BP Patient Position: Sitting) 61 97.5 °F (36.4 °C) (Oral) 16 5' 10\" (1.778 m) 99% Smoking Status Former Smoker Preferred Pharmacy Pharmacy Name Phone [facility], [address] [id number] Your Updated Medication List  
  
   
This list is accurate as of: 10/20/17 12:29 PM.  Always use your most recent med list.  
  
  
  
  
 amiodarone 200 mg tablet Commonly known as:  CORDARONE Take 1 Tab by mouth daily. Indications: VENTRICULAR RATE CONTROL IN ATRIAL FIBRILLATION  
  
 ascorbic acid (vitamin C) 250 mg tablet Commonly known as:  VITAMIN C Take 1 Tab by mouth daily (with breakfast). aspirin 81 mg chewable tablet Take 1 Tab by mouth daily (with breakfast). atorvastatin 40 mg tablet Commonly known as:  LIPITOR Take 1 Tab by mouth nightly. Indications: DYSLIPIDEMIA  
  
 cholecalciferol 1,000 unit tablet Commonly known as:  VITAMIN D3 Take 1 Tab by mouth daily. cyanocobalamin 1,000 mcg tablet Commonly known as:  VITAMIN B-12 Take 1 Tab by mouth daily. ferrous sulfate 325 mg (65 mg iron) tablet Take 1 Tab by mouth daily (with breakfast). furosemide 20 mg tablet Commonly known as:  LASIX  
1 tablet daily  Indications: Pulmonary Edema due to Chronic Heart Failure  
  
 gabapentin 300 mg capsule Commonly known as:  NEURONTIN Take 1 Cap by mouth three (3) times daily. Indications: NEUROPATHIC PAIN  
  
 HEPARIN FLUSH IV  
3 mL by IntraVENous route two (2) times a week. losartan 25 mg tablet Commonly known as:  COZAAR Take 2 Tabs by mouth daily. Indications: Chronic Heart Failure, hypertension magnesium oxide 400 mg tablet Commonly known as:  MAG-OX Take 1 Tab by mouth daily. metoprolol tartrate 25 mg tablet Commonly known as:  LOPRESSOR Take 1 Tab by mouth every twelve (12) hours. Indications: hypertension, VENTRICULAR RATE CONTROL IN ATRIAL FIBRILLATION  
  
 multivitamin tablet Commonly known as:  ONE A DAY Take 1 Tab by mouth daily. NORMAL SALINE FLUSH INJECTION 10 mL by IntraVENous route daily. PICC line patency  
  
 oxyCODONE-acetaminophen 5-325 mg per tablet Commonly known as:  PERCOCET Take 1 Tab by mouth every four (4) hours as needed (for moderate to severe pain). Max Daily Amount: 6 Tabs. Indications: Pain PLAVIX 75 mg Tab Generic drug:  clopidogrel Take 75 mg by mouth daily. potassium chloride 20 mEq packet Commonly known as:  KLOR-CON Take 1 Packet by mouth daily. [while on Furosemide]  Indications: hypokalemia prevention  
  
 rivaroxaban 20 mg Tab tablet Commonly known as:  Lino Cholo Take 1 Tab by mouth daily (with breakfast). VASCULAR CATHETER: [facility] AND PICC FLUSH PANEL 10 mL by IntraVENous route daily. zinc sulfate 220 (50) mg capsule Commonly known as:  ZINCATE Take 1 Cap by mouth daily. We Performed the Following REFERRAL TO GASTROENTEROLOGY [QMG12 Custom] Comments:  
 Or first available To-Do List   
 10/23/2017 To Be Determined Appointment with Raul Blanco RN at [address] [place] [address] [facility]  
  
 10/25/2017 To Be Determined Appointment with Raul Blanco RN at [address] [place] [address] [facility]  
  
 10/27/2017 To Be Determined Appointment with Raul Blanco RN at [address] [place] [address] [facility]  
  
 10/30/2017 To Be Determined Appointment with Raul Blanco RN at [address] [place] [address] [facility]  
  
 11/01/2017 To Be Determined Appointment with Hayden Chandler RN at [address] [facility]  
  
 11/03/2017 To Be Determined Appointment with Hayden Chandler RN at [address] Referral Information Referral ID Referred By Referred To  
  
 [id number] [facility], 201 [facility] [address] Himanshu quesada, [address] Phone: [phone] Fax: [phone] Visits Status Start Date End Date 1 New Request 10/20/17 10/20/18 If your referral has a status of pending review or denied, additional information will be sent to support the outcome of this decision. Patient Instructions Learning About Peripheral Arterial Disease of the Legs What is peripheral arterial disease? Peripheral arterial disease (PAD) is narrowing or blockage of arteries in your arms and legs. The most common cause of PAD is the buildup of plaque on the inside of arteries. Plaque is made of extra cholesterol, calcium, and other material in your blood. Over time, plaque builds up in the walls of the arteries, including those that supply blood to your legs. This buildup leads to poor blood flow. When you have PAD, you have a risk of having plaque in other arteries in your body. This raises your risk of a heart attack and stroke. This information focuses on peripheral arterial disease of the legs, the area where it is most common. When you have PAD of the legs and you walk or exercise, your leg muscles do not get enough blood, and you can get painful cramps. The cramps are called intermittent claudication. Peripheral arterial disease is also called peripheral vascular disease. What are the symptoms? Many people who have PAD do not have any symptoms. But if you have symptoms, you may have a tight, aching, or squeezing pain in the calf, thigh, or buttock.  This pain usually happens after you have walked a certain distance. The pain goes away if you stop walking. As PAD gets worse, you may have pain in your foot or toe when you are not walking. You also may have symptoms that you can see, such as: · Feet and toes that become pale from exercise or when elevated. · Loss of hair on the feet and toes. · Feet that turn red when dangled. · Blue or purple marks on the legs, feet, or toes. · Sores on the feet or toes. · Discolored or black skin on the legs or feet. This is a sign of gangrene (death of tissue). How can you prevent PAD? · Quit smoking. Quitting smoking is one of the best things you can do to help prevent PAD. If you need help quitting, talk to your doctor about stop-smoking programs and medicines. These can increase your chances of quitting for good. · Stay at a healthy weight. · Manage other health problems, including diabetes, high blood pressure, and high cholesterol. · Be physically active. Get at least 30 minutes of exercise on most days of the week. Walking is a good choice. You also may want to do other activities, such as running, swimming, cycling, or playing tennis or team sports. · Eat a variety of heart-healthy foods. ¨ Eat fruits, vegetables, whole grains (like oatmeal), dried beans and peas, nuts and seeds, soy products (like tofu), and fat-free or low-fat dairy products. ¨ Replace butter, margarine, and hydrogenated or partially hydrogenated oils with olive and canola oils. (Canola oil margarine without trans fat is fine.) ¨ Replace red meat with fish, poultry, and soy protein (like tofu). ¨ Limit processed and packaged foods like chips, crackers, and cookies. How is PAD treated? Your doctor may suggest ways to relieve symptoms and lower your risk of heart attack and stroke. These may include: · Quitting smoking. It's one of the most important things you can do.  If you need help quitting, talk to your doctor about stop-smoking programs and medicines. These can increase your chances of quitting for good. · Eating heart-healthy foods. · Staying at a healthy weight. Lose weight if you need to. · Regular exercise. Your doctor may suggest a program that includes walking and weight training exercises. You might try a supervised program or try it at home. Your goal is to be able to walk farther without pain. · Medicines that help manage other problems such as high blood pressure and high cholesterol. · Medicine, such as aspirin, that prevents blood clots which could cause a heart attack or stroke. · Procedures, such as opening narrowed or blocked arteries (angioplasty) or using healthy blood vessels to create detours around narrowed or blocked arteries (bypass surgery). Follow-up care is a key part of your treatment and safety. Be sure to make and go to all appointments, and call your doctor if you are having problems. It's also a good idea to know your test results and keep a list of the medicines you take. Where can you learn more? Go to http://yuniel-lyndsay.info/. Enter Y160 in the search box to learn more about \"Learning About Peripheral Arterial Disease of the Legs. \" Current as of: June 4, 2016 Content Version: 11.3 © 5697-1001 [employer]. Care instructions adapted under license by [employer] (which disclaims liability or warranty for this information). If you have questions about a medical condition or this instruction, always ask your healthcare professional. [place] [address] any warranty or liability for your use of this information. Atrial Fibrillation: Care Instructions Your Care Instructions Atrial fibrillation is an irregular and often fast heartbeat. Treating this condition is important for several reasons. It can cause blood clots, which can travel from your heart to your brain and cause a stroke.  If you have a fast heartbeat, you may feel lightheaded, dizzy, and weak. An irregular heartbeat can also increase your risk for heart failure. Atrial fibrillation is often the result of another heart condition, such as high blood pressure or coronary artery disease. Making changes to improve your heart condition will help you stay healthy and active. Follow-up care is a key part of your treatment and safety. Be sure to make and go to all appointments, and call your doctor if you are having problems. It's also a good idea to know your test results and keep a list of the medicines you take. How can you care for yourself at home? Medicines · Take your medicines exactly as prescribed. Call your doctor if you think you are having a problem with your medicine. You will get more details on the specific medicines your doctor prescribes. · If your doctor has given you a blood thinner to prevent a stroke, be sure you get instructions about how to take your medicine safely. Blood thinners can cause serious bleeding problems. · Do not take any vitamins, over-the-counter drugs, or herbal products without talking to your doctor first. 
Lifestyle changes · Do not smoke. Smoking can increase your chance of a stroke and heart attack. If you need help quitting, talk to your doctor about stop-smoking programs and medicines. These can increase your chances of quitting for good. · Eat a heart-healthy diet. · Stay at a healthy weight. Lose weight if you need to. · Limit alcohol to 2 drinks a day for men and 1 drink a day for women. Too much alcohol can cause health problems. · Avoid colds and flu. Get a pneumococcal vaccine shot. If you have had one before, ask your doctor whether you need another dose. Get a flu shot every year. If you must be around people with colds or flu, wash your hands often. Activity · If your doctor recommends it, get more exercise.  Walking is a good choice. Bit by bit, increase the amount you walk every day. Try for at least 30 minutes on most days of the week. You also may want to swim, bike, or do other activities. Your doctor may suggest that you join a cardiac rehabilitation program so that you can have help increasing your physical activity safely. · Start light exercise if your doctor says it is okay. Even a small amount will help you get stronger, have more energy, and manage stress. Walking is an easy way to get exercise. Start out by walking a little more than you did in the hospital. Gradually increase the amount you walk. · When you exercise, watch for signs that your heart is working too hard. You are pushing too hard if you cannot talk while you are exercising. If you become short of breath or dizzy or have chest pain, sit down and rest immediately. · Check your pulse regularly. Place two fingers on the artery at the palm side of your wrist, in line with your thumb. If your heartbeat seems uneven or fast, talk to your doctor. When should you call for help? Call 911 anytime you think you may need emergency care. For example, call if: 
· You have symptoms of a heart attack. These may include: ¨ Chest pain or pressure, or a strange feeling in the chest. 
¨ Sweating. ¨ Shortness of breath. ¨ Nausea or vomiting. ¨ Pain, pressure, or a strange feeling in the back, neck, jaw, or upper belly or in one or both shoulders or arms. ¨ Lightheadedness or sudden weakness. ¨ A fast or irregular heartbeat. After you call 911, the  may tell you to chew 1 adult-strength or 2 to 4 low-dose aspirin. Wait for an ambulance. Do not try to drive yourself. · You have symptoms of a stroke. These may include: 
¨ Sudden numbness, tingling, weakness, or loss of movement in your face, arm, or leg, especially on only one side of your body. ¨ Sudden vision changes. ¨ Sudden trouble speaking. ¨ Sudden confusion or trouble understanding simple statements. ¨ Sudden problems with walking or balance. ¨ A sudden, severe headache that is different from past headaches. · You passed out (lost consciousness). Call your doctor now or seek immediate medical care if: 
· You have new or increased shortness of breath. · You feel dizzy or lightheaded, or you feel like you may faint. · Your heart rate becomes irregular. · You can feel your heart flutter in your chest or skip heartbeats. Tell your doctor if these symptoms are new or worse. Watch closely for changes in your health, and be sure to contact your doctor if you have any problems. Where can you learn more? Go to http://yuniel-lyndsay.info/. Enter U020 in the search box to learn more about \"Atrial Fibrillation: Care Instructions. \" Current as of: September 21, 2016 Content Version: 11.3 © 3430-9797 [employer]. Care instructions adapted under license by [employer] (which disclaims liability or warranty for this information). If you have questions about a medical condition or this instruction, always ask your healthcare professional. [address] any warranty or liability for your use of this information. Please provide this summary of care documentation to your next provider. Your primary care clinician is listed as Jose E Silveira. If you have any questions after today's visit, please call [phone].

## 2017-10-20 NOTE — PROGRESS NOTES
HISTORY OF PRESENT ILLNESS  Summer Patel is a 59 y.o. male. HPI: Dr. Natalia Rodriguez patient. Here with c/o blood after bowel movement when he wipes since last 2 days. Said no blood in commode only on toilet paper. No rectal pain. No constipation, no straining. No abdominal pain. No nausea or vomiting. No appetite or weight changes. No unusual fatigue. He has h/o a.fib. On Winter Risk  as it was recently changed from coumadin due to poor tolerance as felt dizziness. Following cardiology. For short time coumadin was on hold due to nose bleed and blood from rectum. During last visit per patient no more nose bleed and rectal bleed since long time and it was restarted. FOBT was negative before started anticoagulation. Also recently LKA followed by hematoma due to injury. Had wound wac and now it is removed. Following vascular surgery closely. Also recently had hospitalization and multiple vascular procedure done. Review recent vascular surgery follow up visit note. Currently on aspirin, plavix and for a.fib on xeralto. Noted anemia most probably due to that. Now has an appt with hematology. Visit Vitals    /60 (BP 1 Location: Left arm, BP Patient Position: Sitting)    Pulse 61    Temp 97.5 °F (36.4 °C) (Oral)    Resp 16    Ht 5' 10\" (1.778 m)    SpO2 99%     Review medication list, vitals, problem list,allergies. ROS: see HPI     Physical Exam   Constitutional: He is oriented to person, place, and time. No distress. Cardiovascular: Normal heart sounds. Abdominal: Soft. There is no tenderness. Neurological: He is oriented to person, place, and time. ASSESSMENT and PLAN    ICD-10-CM ICD-9-CM    1. Blood in stool: only when he wipes. No jose luis blood with stool or in commode. For now will hold of anticoagulation with xeralto. Will send him to GI for further evaluation. Had colonoscopy done 2 years ago. Discussed with him that risk of storke associated with a.fib without on anticoagulation. Daughter and pt both understands it. Currently per cardiology follow up notes he is in sinus rhythm on amiodarone. K92.1 578.1 REFERRAL TO GASTROENTEROLOGY   2. Atrial fibrillation, unspecified type ([facility]): stable. HR well controlled. For now hold off anticoagulation. I48.91 427.31    3. PAD (peripheral artery disease) ([facility]): post multiple procedure. On aspirin and plavix. Will continue it and observe for now. I73.9 443.9    Pt understood and agree with the plan   Anemia: probably due to multiple vascular procedure. For now has coming up appt with hematology.    Follow-up Disposition: Not on File as schedule

## 2017-10-20 NOTE — PATIENT INSTRUCTIONS
Learning About Peripheral Arterial Disease of the Legs  What is peripheral arterial disease? Peripheral arterial disease (PAD) is narrowing or blockage of arteries in your arms and legs. The most common cause of PAD is the buildup of plaque on the inside of arteries. Plaque is made of extra cholesterol, calcium, and other material in your blood. Over time, plaque builds up in the walls of the arteries, including those that supply blood to your legs. This buildup leads to poor blood flow. When you have PAD, you have a risk of having plaque in other arteries in your body. This raises your risk of a heart attack and stroke. This information focuses on peripheral arterial disease of the legs, the area where it is most common. When you have PAD of the legs and you walk or exercise, your leg muscles do not get enough blood, and you can get painful cramps. The cramps are called intermittent claudication. Peripheral arterial disease is also called peripheral vascular disease. What are the symptoms? Many people who have PAD do not have any symptoms. But if you have symptoms, you may have a tight, aching, or squeezing pain in the calf, thigh, or buttock. This pain usually happens after you have walked a certain distance. The pain goes away if you stop walking. As PAD gets worse, you may have pain in your foot or toe when you are not walking. You also may have symptoms that you can see, such as:  · Feet and toes that become pale from exercise or when elevated. · Loss of hair on the feet and toes. · Feet that turn red when dangled. · Blue or purple marks on the legs, feet, or toes. · Sores on the feet or toes. · Discolored or black skin on the legs or feet. This is a sign of gangrene (death of tissue). How can you prevent PAD? · Quit smoking. Quitting smoking is one of the best things you can do to help prevent PAD. If you need help quitting, talk to your doctor about stop-smoking programs and medicines. These can increase your chances of quitting for good. · Stay at a healthy weight. · Manage other health problems, including diabetes, high blood pressure, and high cholesterol. · Be physically active. Get at least 30 minutes of exercise on most days of the week. Walking is a good choice. You also may want to do other activities, such as running, swimming, cycling, or playing tennis or team sports. · Eat a variety of heart-healthy foods. ¨ Eat fruits, vegetables, whole grains (like oatmeal), dried beans and peas, nuts and seeds, soy products (like tofu), and fat-free or low-fat dairy products. ¨ Replace butter, margarine, and hydrogenated or partially hydrogenated oils with olive and canola oils. (Canola oil margarine without trans fat is fine.)  ¨ Replace red meat with fish, poultry, and soy protein (like tofu). ¨ Limit processed and packaged foods like chips, crackers, and cookies. How is PAD treated? Your doctor may suggest ways to relieve symptoms and lower your risk of heart attack and stroke. These may include:  · Quitting smoking. It's one of the most important things you can do. If you need help quitting, talk to your doctor about stop-smoking programs and medicines. These can increase your chances of quitting for good. · Eating heart-healthy foods. · Staying at a healthy weight. Lose weight if you need to. · Regular exercise. Your doctor may suggest a program that includes walking and weight training exercises. You might try a supervised program or try it at home. Your goal is to be able to walk farther without pain. · Medicines that help manage other problems such as high blood pressure and high cholesterol. · Medicine, such as aspirin, that prevents blood clots which could cause a heart attack or stroke. · Procedures, such as opening narrowed or blocked arteries (angioplasty) or using healthy blood vessels to create detours around narrowed or blocked arteries (bypass surgery).   Follow-up care is a key part of your treatment and safety. Be sure to make and go to all appointments, and call your doctor if you are having problems. It's also a good idea to know your test results and keep a list of the medicines you take. Where can you learn more? Go to http://yuniel-lyndsay.info/. Enter I251 in the search box to learn more about \"Learning About Peripheral Arterial Disease of the Legs. \"  Current as of: June 4, 2016  Content Version: 11.3  © 2533-4582 [employer]. Care instructions adapted under license by [employer] (which disclaims liability or warranty for this information). If you have questions about a medical condition or this instruction, always ask your healthcare professional. [address] any warranty or liability for your use of this information. Atrial Fibrillation: Care Instructions  Your Care Instructions    Atrial fibrillation is an irregular and often fast heartbeat. Treating this condition is important for several reasons. It can cause blood clots, which can travel from your heart to your brain and cause a stroke. If you have a fast heartbeat, you may feel lightheaded, dizzy, and weak. An irregular heartbeat can also increase your risk for heart failure. Atrial fibrillation is often the result of another heart condition, such as high blood pressure or coronary artery disease. Making changes to improve your heart condition will help you stay healthy and active. Follow-up care is a key part of your treatment and safety. Be sure to make and go to all appointments, and call your doctor if you are having problems. It's also a good idea to know your test results and keep a list of the medicines you take. How can you care for yourself at home? Medicines  · Take your medicines exactly as prescribed. Call your doctor if you think you are having a problem with your medicine.  You will get more details on the specific medicines your doctor prescribes. · If your doctor has given you a blood thinner to prevent a stroke, be sure you get instructions about how to take your medicine safely. Blood thinners can cause serious bleeding problems. · Do not take any vitamins, over-the-counter drugs, or herbal products without talking to your doctor first.  Lifestyle changes  · Do not smoke. Smoking can increase your chance of a stroke and heart attack. If you need help quitting, talk to your doctor about stop-smoking programs and medicines. These can increase your chances of quitting for good. · Eat a heart-healthy diet. · Stay at a healthy weight. Lose weight if you need to. · Limit alcohol to 2 drinks a day for men and 1 drink a day for women. Too much alcohol can cause health problems. · Avoid colds and flu. Get a pneumococcal vaccine shot. If you have had one before, ask your doctor whether you need another dose. Get a flu shot every year. If you must be around people with colds or flu, wash your hands often. Activity  · If your doctor recommends it, get more exercise. Walking is a good choice. Bit by bit, increase the amount you walk every day. Try for at least 30 minutes on most days of the week. You also may want to swim, bike, or do other activities. Your doctor may suggest that you join a cardiac rehabilitation program so that you can have help increasing your physical activity safely. · Start light exercise if your doctor says it is okay. Even a small amount will help you get stronger, have more energy, and manage stress. Walking is an easy way to get exercise. Start out by walking a little more than you did in the hospital. Gradually increase the amount you walk. · When you exercise, watch for signs that your heart is working too hard. You are pushing too hard if you cannot talk while you are exercising. If you become short of breath or dizzy or have chest pain, sit down and rest immediately.   · Check your pulse regularly. Place two fingers on the artery at the palm side of your wrist, in line with your thumb. If your heartbeat seems uneven or fast, talk to your doctor. When should you call for help? Call 911 anytime you think you may need emergency care. For example, call if:  · You have symptoms of a heart attack. These may include:  ¨ Chest pain or pressure, or a strange feeling in the chest.  ¨ Sweating. ¨ Shortness of breath. ¨ Nausea or vomiting. ¨ Pain, pressure, or a strange feeling in the back, neck, jaw, or upper belly or in one or both shoulders or arms. ¨ Lightheadedness or sudden weakness. ¨ A fast or irregular heartbeat. After you call 911, the  may tell you to chew 1 adult-strength or 2 to 4 low-dose aspirin. Wait for an ambulance. Do not try to drive yourself. · You have symptoms of a stroke. These may include:  ¨ Sudden numbness, tingling, weakness, or loss of movement in your face, arm, or leg, especially on only one side of your body. ¨ Sudden vision changes. ¨ Sudden trouble speaking. ¨ Sudden confusion or trouble understanding simple statements. ¨ Sudden problems with walking or balance. ¨ A sudden, severe headache that is different from past headaches. · You passed out (lost consciousness). Call your doctor now or seek immediate medical care if:  · You have new or increased shortness of breath. · You feel dizzy or lightheaded, or you feel like you may faint. · Your heart rate becomes irregular. · You can feel your heart flutter in your chest or skip heartbeats. Tell your doctor if these symptoms are new or worse. Watch closely for changes in your health, and be sure to contact your doctor if you have any problems. Where can you learn more? Go to http://yuniel-lyndsay.info/. Enter U020 in the search box to learn more about \"Atrial Fibrillation: Care Instructions. \"  Current as of: September 21, 2016  Content Version: 11.3  © 9655-5713 [employer]. Care instructions adapted under license by [employer] (which disclaims liability or warranty for this information). If you have questions about a medical condition or this instruction, always ask your healthcare professional. [address] any warranty or liability for your use of this information.

## 2017-10-21 VITALS
DIASTOLIC BLOOD PRESSURE: 68 MMHG | RESPIRATION RATE: 20 BRPM | TEMPERATURE: 96 F | SYSTOLIC BLOOD PRESSURE: 118 MMHG | HEART RATE: 78 BPM | OXYGEN SATURATION: 98 %

## 2017-10-23 ENCOUNTER — HOME CARE VISIT (OUTPATIENT)
Dept: SCHEDULING | Facility: HOME HEALTH | Age: 57
End: 2017-10-23
Payer: COMMERCIAL

## 2017-10-23 VITALS
HEART RATE: 63 BPM | SYSTOLIC BLOOD PRESSURE: 156 MMHG | DIASTOLIC BLOOD PRESSURE: 84 MMHG | OXYGEN SATURATION: 98 % | RESPIRATION RATE: 18 BRPM | TEMPERATURE: 98.6 F

## 2017-10-23 PROCEDURE — G0299 HHS/HOSPICE OF RN EA 15 MIN: HCPCS

## 2017-10-24 VITALS
RESPIRATION RATE: 18 BRPM | TEMPERATURE: 98.3 F | SYSTOLIC BLOOD PRESSURE: 149 MMHG | HEART RATE: 63 BPM | OXYGEN SATURATION: 92 % | DIASTOLIC BLOOD PRESSURE: 88 MMHG

## 2017-10-25 ENCOUNTER — HOSPITAL ENCOUNTER (OUTPATIENT)
Dept: LAB | Age: 57
Discharge: HOME | End: 2017-10-25
Payer: COMMERCIAL

## 2017-10-25 ENCOUNTER — OFFICE VISIT (OUTPATIENT)
Dept: VASCULAR SURGERY | Age: 57
End: 2017-10-25

## 2017-10-25 VITALS
DIASTOLIC BLOOD PRESSURE: 86 MMHG | WEIGHT: 143 LBS | HEIGHT: 70 IN | SYSTOLIC BLOOD PRESSURE: 138 MMHG | BODY MASS INDEX: 20.47 KG/M2

## 2017-10-25 DIAGNOSIS — Z89.612 STATUS POST ABOVE KNEE AMPUTATION OF LEFT LOWER EXTREMITY: ICD-10-CM

## 2017-10-25 DIAGNOSIS — Z47.81 AFTERCARE FOR AMPUTATION STUMP: ICD-10-CM

## 2017-10-25 DIAGNOSIS — L97.319: Chronic | ICD-10-CM

## 2017-10-25 DIAGNOSIS — Z47.81 AFTERCARE FOR AMPUTATION STUMP: Primary | ICD-10-CM

## 2017-10-25 DIAGNOSIS — I70.213 ATHEROSCLEROSIS OF NATIVE ARTERY OF BOTH LOWER EXTREMITIES WITH INTERMITTENT CLAUDICATION (HCC): Chronic | ICD-10-CM

## 2017-10-25 PROCEDURE — 87077 CULTURE AEROBIC IDENTIFY: CPT | Performed by: PHYSICIAN ASSISTANT

## 2017-10-25 PROCEDURE — 87070 CULTURE OTHR SPECIMN AEROBIC: CPT | Performed by: PHYSICIAN ASSISTANT

## 2017-10-25 NOTE — MR AVS SNAPSHOT
Visit Information Date & Time Provider Department Dept. Phone Encounter #  
 10/25/2017 11:30 AM [facility] [id number] [phone] Your Appointments 11/2/2017 10:45 AM  
Follow Up with [address] [place] [facility] ([facility]) Appt Note: 2 weeks wound check [address] [facility] [address] [facility] [address]  
[phone] [address] [facility] [address] [facility]  
  
    
 11/9/2017  9:40 AM  
Follow Up with Merlinda Forget, MD  
[facility] [address] ([facility]) Appt Note: 1 mth f/up [place] [address] [address] [address] [phone] Jennings Lilly  
  
    
 11/20/2017  9:00 AM  
ROUTINE CARE with Gary Hastings MD  
[address] [place] [address] ([facility]) Appt Note: routine f/u 2mo [address] [facility] [address]  
[address] [facility] [address] [facility] [address]  
  
    
 1/18/2018 10:00 AM  
ROUTINE CARE with Gary Hastings MD  
[address] [place] [address] ([facility]) Appt Note: routine f/u 6mo [address] [facility] [address]  
[phone] Upcoming Health Maintenance Date Due COLONOSCOPY 7/23/2020 DTaP/Tdap/Td series (2 - Td) 7/5/2026 Allergies as of 10/25/2017  Review Complete On: 10/25/2017 By: Jennifer Castaneda Severity Noted Reaction Type Reactions Morphine  08/03/2017   Side Effect Other (comments) Patient gets confused with morphine. Current Immunizations  Reviewed on 10/20/2017 Name Date Tdap 7/5/2016 Not reviewed this visit You Were Diagnosed With   
  
 Codes Comments Aftercare for amputation stump    -  Primary ICD-10-CM: T46.43 ICD-9-CM: V54.89 Vitals BP Height(growth percentile) Weight(growth percentile) BMI Smoking Status 138/86 (BP 1 Location: Left arm, BP Patient Position: Sitting) 5' 10\" (1.778 m) 143 lb (64.9 kg) 20.52 kg/m2 Former Smoker Vitals History BMI and BSA Data Body Mass Index Body Surface Area 20.52 kg/m 2 1.79 m 2 Preferred Pharmacy Pharmacy Name Phone [facility], [phone] Located within [facility] [address] [place] [address] Your Updated Medication List  
  
   
This list is accurate as of: 10/25/17 12:30 PM.  Always use your most recent med list.  
  
  
  
  
 amiodarone 200 mg tablet Commonly known as:  CORDARONE Take 1 Tab by mouth daily. Indications: VENTRICULAR RATE CONTROL IN ATRIAL FIBRILLATION  
  
 aspirin 81 mg chewable tablet Take 1 Tab by mouth daily (with breakfast). atorvastatin 40 mg tablet Commonly known as:  LIPITOR Take 1 Tab by mouth nightly. Indications: DYSLIPIDEMIA  
  
 cyanocobalamin 1,000 mcg tablet Commonly known as:  VITAMIN B-12 Take 1 Tab by mouth daily. furosemide 20 mg tablet Commonly known as:  LASIX  
1 tablet daily  Indications: Pulmonary Edema due to Chronic Heart Failure  
  
 gabapentin 300 mg capsule Commonly known as:  NEURONTIN Take 1 Cap by mouth three (3) times daily. Indications: NEUROPATHIC PAIN  
  
 losartan 25 mg tablet Commonly known as:  COZAAR Take 2 Tabs by mouth daily. Indications: Chronic Heart Failure, hypertension  
  
 magnesium oxide 400 mg tablet Commonly known as:  MAG-OX Take 1 Tab by mouth daily. metoprolol tartrate 25 mg tablet Commonly known as:  LOPRESSOR Take 1 Tab by mouth every twelve (12) hours. Indications: hypertension, VENTRICULAR RATE CONTROL IN ATRIAL FIBRILLATION  
  
 multivitamin tablet Commonly known as:  ONE A DAY Take 1 Tab by mouth daily. oxyCODONE-acetaminophen 5-325 mg per tablet Commonly known as:  PERCOCET  
 Take 1 Tab by mouth every four (4) hours as needed (for moderate to severe pain). Max Daily Amount: 6 Tabs. Indications: Pain PLAVIX 75 mg Tab Generic drug:  clopidogrel Take 75 mg by mouth daily. potassium chloride 20 mEq packet Commonly known as:  KLOR-CON Take 1 Packet by mouth daily. [while on Furosemide]  Indications: hypokalemia prevention  
  
 zinc sulfate 220 (50) mg capsule Commonly known as:  ZINCATE Take 1 Cap by mouth daily. To-Do List   
 10/27/2017 To Be Determined Appointment with Katie Cabrera RN at [address] [place] [address] [facility]  
  
 10/30/2017 To Be Determined Appointment with Katie Cabrera RN at [address] [place] [address] MED CTR  
  
 11/01/2017 To Be Determined Appointment with Katie Cabrera RN at [address] [place] [address] MED CTR  
  
 11/03/2017 To Be Determined Appointment with Katie Cabrera RN at [address] [place] [address] Please provide this summary of care documentation to your next provider. Your primary care clinician is listed as Eli Connelly. If you have any questions after today's visit, please call [phone].

## 2017-10-25 NOTE — PROGRESS NOTES
Done per your note 10/5 \"IMPRESSION:   Coronary artery disease. No angina status post PTCA of her right coronary artery lesion  History of ischemic cardiomyopathy with ejection fraction 40%  Atrial fibrillation requiring DC cardioversion. Presently in sinus rhythm on amiodarone  Hypertension, controlled     PLAN:  Xarelto 20 mg p.o. daily.   Basic metabolic panel was recently checked and his renal function was normal.  Echocardiography  Return in 1 month \"

## 2017-10-25 NOTE — PROGRESS NOTES
Cleansed wound to left AKA incision with wound cleanser and gauze, wound is granulating with small amount ss drainage, surrounding tissue WNL. Applied calcium alginate and covered with allevyn, patient tolerated well. Cleansed wound to right upper abdomen incision site with wound cleanser and gauze, wound is granulating with scant purulent drainage, which wound culture was obtained prior to cleansing wound, surrounding tissue WNL, applied calcium alginate and covered with allevyn, patient tolerated well.

## 2017-10-25 NOTE — PROGRESS NOTES
57-year-old gentleman with a left AKA. He ended up having a fall and developed a large hematoma that arose from his stump site. He underwent washout of hematoma in the OR with wound vac placement. He presents today for wound check. He is doing well. He continues with [address] coming 3 days per week. He has no complaints in the office today . He continues to improve daily. No fever/chills. Denies pain today. Wound vac taken down in office today and wound is almost completely healed at this point. Wound is filled with healthy granulation tissue and is superficial at this time. Discussed that we can DC his wound VAC at this time and will continue his home health care for local wound care. Patient is extremely motivated to walk again and is very excited to get a prosthetic limb. Discussed that we will contact prosthesis today to get him moving forward with the prosthetic process. Patient is also very motivated to return to work. The wounds on his right foot continue to heal.  The heel is completely healed at this time and lateral malleolus ulcer closed with scab overlying. He does follow with podiatry for this. He does have one small area on his right chest (overlying ax-fem bypass) which remains open with scant drainage. There is no abscess appreciated. Scant purulent appearing drainage noted on exam today and was cultured in the office today. There was what appeared to be either a small sclerosed vein or possibly an old stitch within the wound that was removed today. The area immediately looked better with removal of the substance  Overall he feels that he is doing much better and continues to grow stronger day by day. He has gained some weight back and is eating well. We will have him return to the office in 2 weeks for wound assessment. I am hopeful that left stump wound will be completely healed at that time and we can clear him for prosthetic limb at that time.   He will call the office sooner as needed. We will also order follow-up arterial studies at that time. He remains on Plavix and ASA.     Patient expresses understanding and agrees to plan

## 2017-10-27 ENCOUNTER — HOME CARE VISIT (OUTPATIENT)
Dept: SCHEDULING | Facility: HOME HEALTH | Age: 57
End: 2017-10-27
Payer: COMMERCIAL

## 2017-10-27 LAB
BACTERIA SPEC CULT: ABNORMAL
GRAM STN SPEC: ABNORMAL
GRAM STN SPEC: ABNORMAL
SERVICE CMNT-IMP: ABNORMAL

## 2017-10-27 PROCEDURE — G0299 HHS/HOSPICE OF RN EA 15 MIN: HCPCS

## 2017-10-30 ENCOUNTER — HOME CARE VISIT (OUTPATIENT)
Dept: SCHEDULING | Facility: HOME HEALTH | Age: 57
End: 2017-10-30
Payer: COMMERCIAL

## 2017-10-30 VITALS
RESPIRATION RATE: 18 BRPM | OXYGEN SATURATION: 98 % | TEMPERATURE: 98.7 F | SYSTOLIC BLOOD PRESSURE: 139 MMHG | HEART RATE: 56 BPM | DIASTOLIC BLOOD PRESSURE: 77 MMHG

## 2017-10-30 PROCEDURE — G0299 HHS/HOSPICE OF RN EA 15 MIN: HCPCS

## 2017-10-30 RX ORDER — AMIODARONE HYDROCHLORIDE 200 MG/1
TABLET ORAL
Qty: 30 TAB | Refills: 0 | Status: SHIPPED | OUTPATIENT
Start: 2017-10-30 | End: 2017-10-31 | Stop reason: SDUPTHER

## 2017-10-30 RX ORDER — ZINC SULFATE 50(220)MG
CAPSULE ORAL
Qty: 90 CAP | Refills: 0 | Status: SHIPPED | OUTPATIENT
Start: 2017-10-30 | End: 2017-12-19 | Stop reason: SDUPTHER

## 2017-10-30 RX ORDER — AMIODARONE HYDROCHLORIDE 200 MG/1
TABLET ORAL
Qty: 30 TAB | Refills: 0 | OUTPATIENT
Start: 2017-10-30

## 2017-10-31 VITALS
DIASTOLIC BLOOD PRESSURE: 85 MMHG | SYSTOLIC BLOOD PRESSURE: 138 MMHG | OXYGEN SATURATION: 93 % | HEART RATE: 54 BPM | TEMPERATURE: 98.4 F | RESPIRATION RATE: 18 BRPM

## 2017-10-31 RX ORDER — AMIODARONE HYDROCHLORIDE 200 MG/1
TABLET ORAL
Qty: 90 TAB | Refills: 3 | Status: SHIPPED | OUTPATIENT
Start: 2017-10-31 | End: 2018-09-25 | Stop reason: SDUPTHER

## 2017-11-01 ENCOUNTER — HOME CARE VISIT (OUTPATIENT)
Dept: SCHEDULING | Facility: HOME HEALTH | Age: 57
End: 2017-11-01
Payer: COMMERCIAL

## 2017-11-01 PROCEDURE — G0299 HHS/HOSPICE OF RN EA 15 MIN: HCPCS

## 2017-11-02 ENCOUNTER — OFFICE VISIT (OUTPATIENT)
Dept: VASCULAR SURGERY | Age: 57
End: 2017-11-02

## 2017-11-02 VITALS
OXYGEN SATURATION: 98 % | DIASTOLIC BLOOD PRESSURE: 89 MMHG | HEART RATE: 57 BPM | TEMPERATURE: 98.5 F | SYSTOLIC BLOOD PRESSURE: 157 MMHG | RESPIRATION RATE: 18 BRPM

## 2017-11-02 VITALS
WEIGHT: 143 LBS | BODY MASS INDEX: 20.47 KG/M2 | DIASTOLIC BLOOD PRESSURE: 68 MMHG | HEIGHT: 70 IN | SYSTOLIC BLOOD PRESSURE: 124 MMHG

## 2017-11-02 DIAGNOSIS — I74.09 AORTOILIAC OCCLUSIVE DISEASE (HCC): Chronic | ICD-10-CM

## 2017-11-02 DIAGNOSIS — Z89.612 STATUS POST ABOVE KNEE AMPUTATION OF LEFT LOWER EXTREMITY: ICD-10-CM

## 2017-11-02 DIAGNOSIS — I70.213 ATHEROSCLEROSIS OF NATIVE ARTERY OF BOTH LOWER EXTREMITIES WITH INTERMITTENT CLAUDICATION (HCC): Primary | Chronic | ICD-10-CM

## 2017-11-02 NOTE — PROGRESS NOTES
Cleansed wound to left AKA stump incision with wound cleanser and gauze, wound is granulating with scant amount ss drainage, surrounding tissue WNL. Applied calcium alginate and allevyn, patient tolerated well. Cleansed wound to right side of upper abdomen with wound cleanser and gauze, wound is a pinhole with scant purulent drainage, surrounding tissue WNL. Applied calcium alginate and covered with allevyn, patient tolerated well.

## 2017-11-02 NOTE — MR AVS SNAPSHOT
Visit Information Date & Time Provider Department Dept. Phone Encounter #  
 11/2/2017 10:45 AM [facility] [phone] [id number] Follow-up Instructions Return in about 1 month (around 12/2/2017). Your Appointments 11/9/2017  9:40 AM  
Follow Up with Pantera Clement MD  
[facility] ([facility] [address]) Appt Note: 1 mth f/up [place] [address] [address] [address] [phone] Abram Carr  
  
    
 11/20/2017  9:00 AM  
ROUTINE CARE with Sergey Davis MD  
[facility] ([address] [place] [address] Road) Appt Note: routine f/u 2mo [address] [facility] [address] [place] [address] [address] 200 [facility]  
  
    
 12/6/2017  8:00 AM  
PROCEDURE with [facility] 2 [facility] ([address] [place] [address]) Appt Note: AX FEM [address] [address] 200 [facility] [address]  
[phone] [address] [facility]  
  
    
 12/6/2017  9:00 AM  
PROCEDURE with [facility] 2 [facility] ([address] [place] [address] Road) Appt Note: FEM FEM [address] 6[address] 200 [facility] [address]  
[phone]  
  
    
 12/6/2017 10:00 AM  
PROCEDURE with [facility] NONIMAGING [facility] ([address] [place] [address]) Appt Note: MARINA WILD [address] [place] [address] 200 [facility] [address]  
[phone] [address] Michae Finely [address] [place] [address]  
  
    
 1/18/2018 10:00 AM  
ROUTINE CARE with Sergey Davis MD  
[facility] ([address]) Appt Note: routine f/u 6mo [address] 200 [facility] [address]  
[phone] Upcoming Health Maintenance Date Due COLONOSCOPY 7/23/2020 DTaP/Tdap/Td series (2 - Td) 7/5/2026 Allergies as of 11/2/2017  Review Complete On: 11/2/2017 By: Jessa Kamara LPN Severity Noted Reaction Type Reactions Morphine  08/03/2017   Side Effect Other (comments) Patient gets confused with morphine. Current Immunizations  Reviewed on 10/20/2017 Name Date Tdap 7/5/2016 Not reviewed this visit You Were Diagnosed With   
  
 Codes Comments Atherosclerosis of native artery of both lower extremities with intermittent claudication ([facility] [address]    -  Primary ICD-10-CM: G14.859 ICD-9-CM: 440.21 Aortoiliac occlusive disease (HCC)     ICD-10-CM: I74.09 
ICD-9-CM: 444.09 Vitals BP Height(growth percentile) Weight(growth percentile) BMI Smoking Status 124/68 (BP 1 Location: Left arm, BP Patient Position: Sitting) 5' 10\" (1.778 m) 143 lb (64.9 kg) 20.52 kg/m2 Former Smoker BMI and BSA Data Body Mass Index Body Surface Area 20.52 kg/m 2 1.79 m 2 Preferred Pharmacy Pharmacy Name Phone [facility] [address] [facility] [address] [facility] Your Updated Medication List  
  
   
This list is accurate as of: 11/2/17 11:27 AM.  Always use your most recent med list.  
  
  
  
  
 amiodarone 200 mg tablet Commonly known as:  CORDARONE  
take 1 tablet by mouth once daily INDICATION: VENTRICULAR RATE CONTROL IN ATRIAL FIBRILLATION  
  
 aspirin 81 mg chewable tablet Take 1 Tab by mouth daily (with breakfast). atorvastatin 40 mg tablet Commonly known as:  LIPITOR Take 1 Tab by mouth nightly. Indications: DYSLIPIDEMIA  
  
 cyanocobalamin 1,000 mcg tablet Commonly known as:  VITAMIN B-12 Take 1 Tab by mouth daily. furosemide 20 mg tablet Commonly known as:  LASIX  
1 tablet daily  Indications: Pulmonary Edema due to Chronic Heart Failure  
  
 gabapentin 300 mg capsule Commonly known as:  NEURONTIN  
 Take 1 Cap by mouth three (3) times daily. Indications: NEUROPATHIC PAIN  
  
 losartan 25 mg tablet Commonly known as:  COZAAR Take 2 Tabs by mouth daily. Indications: Chronic Heart Failure, hypertension  
  
 magnesium oxide 400 mg tablet Commonly known as:  MAG-OX Take 1 Tab by mouth daily. metoprolol tartrate 25 mg tablet Commonly known as:  LOPRESSOR Take 1 Tab by mouth every twelve (12) hours. Indications: hypertension, VENTRICULAR RATE CONTROL IN ATRIAL FIBRILLATION  
  
 multivitamin tablet Commonly known as:  ONE A DAY Take 1 Tab by mouth daily. oxyCODONE-acetaminophen 5-325 mg per tablet Commonly known as:  PERCOCET Take 1 Tab by mouth every four (4) hours as needed (for moderate to severe pain). Max Daily Amount: 6 Tabs. Indications: Pain PLAVIX 75 mg Tab Generic drug:  clopidogrel Take 75 mg by mouth daily. potassium chloride 20 mEq packet Commonly known as:  KLOR-CON Take 1 Packet by mouth daily. [while on Furosemide]  Indications: hypokalemia prevention  
  
 zinc sulfate 220 (50) mg capsule Commonly known as:  ZINCATE  
take 1 capsule by mouth once daily Follow-up Instructions Return in about 1 month (around 12/2/2017). To-Do List   
 11/03/2017 To Be Determined Appointment with Chaim Nugent RN at [address] [facility] CTR  
  
 12/01/2017 Imaging:  MARINA WBI LTD SINGLE LEVEL AMB   
  
 12/01/2017 Imaging:  DUPLEX AORTA ILIAC GRAFT LTD RT AMB   
  
 12/01/2017 Imaging:  DUPLEX LOW EXT ARTERY / GRAFTS LTD RIGHT AMB Please provide this summary of care documentation to your next provider. Your primary care clinician is listed as Ryan Donald. If you have any questions after today's visit, please call [phone].

## 2017-11-02 NOTE — PROGRESS NOTES
73-year-old gentleman with a left AKA. He suffered a fall after his hospital discharge and developed a large hematoma that arose from his stump site. He underwent washout of hematoma in the OR with wound vac placement. He presents today for wound check. He is doing extremely well at this point. He continues with [facility] but daughter reports they are discharging him this week as his wound is almost completely closed. He has no complaints in the office today. He continues to improve and regain strength. No fever/chills. Denies pain. Wound vac was discontinued at his last visit. Wound is filled with healthy granulation tissue and is completely superficial at this time. There remains only a very small area which has not closed with scant bloody drainage measuring ~1 1/2 cm. Patient is extremely motivated to remain independent and to walk again. He would like to return to his home (which he lives alone) and to go back to work. He is very excited to get a prosthetic limb. Discussed that we will contact prosthesis today to get him moving forward with the prosthetic process. I did call Jean with [employer] and he will contact patient. The wounds on his right foot are healed at this point as well. He continues to follow with Podiatry as well. He does have chronic pinpoint opening on his right chest (overlying ax-fem bypass). Scant clear drainage. No signs of infection. Overall he feels that he is doing much better and continues to grow stronger day by day. He has gained some weight back and is eating well. He also has history of right axillary femoral jump bypass interposition and removal of infected right axillary femoral bypass  Discussed that we will obtain follow-up ultrasounds of his bypasses within the 3-4 weeks and he will follow-up afterwards. Hopefully he will have started his prosthetic process at that time and we can assess how he is doing with this as well.   Patient understands to call us sooner as needed. He will continue his Plavix and aspirin as well.

## 2017-11-03 ENCOUNTER — HOME CARE VISIT (OUTPATIENT)
Dept: SCHEDULING | Facility: HOME HEALTH | Age: 57
End: 2017-11-03
Payer: COMMERCIAL

## 2017-11-03 PROCEDURE — G0299 HHS/HOSPICE OF RN EA 15 MIN: HCPCS

## 2017-11-08 VITALS
RESPIRATION RATE: 18 BRPM | HEART RATE: 62 BPM | TEMPERATURE: 98.2 F | DIASTOLIC BLOOD PRESSURE: 86 MMHG | OXYGEN SATURATION: 98 % | SYSTOLIC BLOOD PRESSURE: 150 MMHG

## 2017-11-09 ENCOUNTER — OFFICE VISIT (OUTPATIENT)
Dept: CARDIOLOGY CLINIC | Age: 57
End: 2017-11-09

## 2017-11-09 ENCOUNTER — HOSPITAL ENCOUNTER (OUTPATIENT)
Dept: LAB | Age: 57
Discharge: HOME | End: 2017-11-09

## 2017-11-09 VITALS
OXYGEN SATURATION: 98 % | HEIGHT: 70 IN | DIASTOLIC BLOOD PRESSURE: 76 MMHG | HEART RATE: 65 BPM | SYSTOLIC BLOOD PRESSURE: 140 MMHG | WEIGHT: 153 LBS | BODY MASS INDEX: 21.9 KG/M2

## 2017-11-09 DIAGNOSIS — I48.0 PAROXYSMAL ATRIAL FIBRILLATION (HCC): ICD-10-CM

## 2017-11-09 DIAGNOSIS — I50.20 BENIGN HYPERTENSIVE HEART DISEASE WITH SYSTOLIC CONGESTIVE HEART FAILURE, NYHA CLASS 2 (HCC): Chronic | ICD-10-CM

## 2017-11-09 DIAGNOSIS — I11.0 BENIGN HYPERTENSIVE HEART DISEASE WITH SYSTOLIC CONGESTIVE HEART FAILURE, NYHA CLASS 2 (HCC): Chronic | ICD-10-CM

## 2017-11-09 DIAGNOSIS — I77.9 BILATERAL CAROTID ARTERY DISEASE (HCC): Chronic | ICD-10-CM

## 2017-11-09 DIAGNOSIS — E03.9 ACQUIRED HYPOTHYROIDISM: ICD-10-CM

## 2017-11-09 DIAGNOSIS — I27.20 MODERATE TO SEVERE PULMONARY HYPERTENSION (HCC): Chronic | ICD-10-CM

## 2017-11-09 DIAGNOSIS — I50.22 CHRONIC SYSTOLIC HEART FAILURE (HCC): Chronic | ICD-10-CM

## 2017-11-09 DIAGNOSIS — I25.5 ISCHEMIC CARDIOMYOPATHY: Primary | Chronic | ICD-10-CM

## 2017-11-09 DIAGNOSIS — I25.10 CORONARY ARTERY DISEASE INVOLVING NATIVE CORONARY ARTERY OF NATIVE HEART WITHOUT ANGINA PECTORIS: Chronic | ICD-10-CM

## 2017-11-09 PROCEDURE — 99001 SPECIMEN HANDLING PT-LAB: CPT

## 2017-11-09 NOTE — PROGRESS NOTES
1. Have you been to the ER, urgent care clinic since your last visit? Hospitalized since your last visit?no    2. Have you seen or consulted any other health care providers outside of the [address] [place] [address] since your last visit? Include any pap smears or colon screening.  no

## 2017-11-09 NOTE — PATIENT INSTRUCTIONS
Continue current medications.    If you have any further questions or concerns, please contact our office. [phone]

## 2017-11-09 NOTE — MR AVS SNAPSHOT
Visit Information Date & Time Provider Department Dept. Phone Encounter #  
 11/9/2017  9:40 AM Kindra Kemp MD [facility] [address] [id number] Your Appointments 11/20/2017  9:00 AM  
ROUTINE CARE with Rhonda Fraga MD  
[facility] ([address]) Appt Note: routine f/u 2mo [address] [facility] 1101 [facility] [address] 200 [facility] [address]  
  
    
 12/6/2017  8:00 AM  
PROCEDURE with [facility] IMAGING 2 [facility] ([address]) Appt Note: AX FEM [address] 927 200 [facility] [address]  
[phone] 2630 [facility] 1M07  
  
    
 12/6/2017  9:00 AM  
PROCEDURE with [facility] IMAGING 2 [facility] ([address] [place] [address] Road) Appt Note: FEM FEM [address] 733 200 [facility] [address]  
[phone]  
  
    
 12/6/2017 10:00 AM  
PROCEDURE with [facility] NONIMAGING [facility] ([address] [place] [address] Road) Appt Note: MARINA [address] 616 200 [facility] [address]  
[phone] Critical access hospital[address] [place] [address] [facility]  
  
    
 12/13/2017 10:00 AM  
Follow Up with [address] [place], [place] [facility] ([address]) Appt Note: 1 MONTH FOLLOW UP WITH PREP AND STUDIES  
 [address] [place] [address] 418 200 [facility] [address]  
[phone] [address] [place] [address] [facility]  
  
    
 1/18/2018 10:00 AM  
ROUTINE CARE with Rhonda Fraga MD  
[facility] ([address]) Appt Note: routine f/u 6mo [address] 200 [facility] [address]  
[phone] Upcoming Health Maintenance Date Due COLONOSCOPY 7/23/2020 DTaP/Tdap/Td series (2 - Td) 7/5/2026 Allergies as of 11/9/2017  Review Complete On: 11/2/2017 By: JUNE Grider Severity Noted Reaction Type Reactions Morphine  08/03/2017   Side Effect Other (comments) Patient gets confused with morphine. Current Immunizations  Reviewed on 10/20/2017 Name Date Tdap 7/5/2016 Not reviewed this visit You Were Diagnosed With   
  
 Codes Comments Ischemic cardiomyopathy    -  Primary ICD-10-CM: I25.5 ICD-9-CM: 414.8 Chronic systolic heart failure (HCC)     ICD-10-CM: I50.22 ICD-9-CM: 428.22 Coronary artery disease involving native coronary artery of native heart without angina pectoris     ICD-10-CM: I25.10 ICD-9-CM: 414.01 Benign hypertensive heart disease with systolic congestive heart failure, NYHA class 2 (HCC)     ICD-10-CM: I11.0, I50.20 ICD-9-CM: 402.11, 428.20, 428.0 Paroxysmal atrial fibrillation (HCC)     ICD-10-CM: I48.0 ICD-9-CM: 427.31 Bilateral carotid artery disease ([facility]     ICD-10-CM: I77.9 ICD-9-CM: 447.9 Moderate to severe pulmonary hypertension     ICD-10-CM: I27.20 ICD-9-CM: 416.8 Acquired hypothyroidism     ICD-10-CM: E03.9 ICD-9-CM: 500. 9 Vitals BP Pulse Height(growth percentile) Weight(growth percentile) SpO2 BMI  
 140/76 65 5' 10\" (1.778 m) 153 lb (69.4 kg) 98% 21.95 kg/m2 Smoking Status Former Smoker Vitals History BMI and BSA Data Body Mass Index Body Surface Area  
 21.95 kg/m 2 1.85 m 2 Preferred Pharmacy Pharmacy Name Phone [facility] [address], [address] Your Updated Medication List  
  
   
This list is accurate as of: 11/9/17 10:12 AM.  Always use your most recent med list.  
  
  
  
  
 amiodarone 200 mg tablet Commonly known as:  CORDARONE  
take 1 tablet by mouth once daily INDICATION: VENTRICULAR RATE CONTROL IN ATRIAL FIBRILLATION  
  
 aspirin 81 mg chewable tablet Take 1 Tab by mouth daily (with breakfast). atorvastatin 40 mg tablet Commonly known as:  LIPITOR Take 1 Tab by mouth nightly. Indications: DYSLIPIDEMIA  
  
 cyanocobalamin 1,000 mcg tablet Commonly known as:  VITAMIN B-12 Take 1 Tab by mouth daily. furosemide 20 mg tablet Commonly known as:  LASIX  
1 tablet daily  Indications: Pulmonary Edema due to Chronic Heart Failure  
  
 gabapentin 300 mg capsule Commonly known as:  NEURONTIN Take 1 Cap by mouth three (3) times daily. Indications: NEUROPATHIC PAIN  
  
 losartan 25 mg tablet Commonly known as:  COZAAR Take 2 Tabs by mouth daily. Indications: Chronic Heart Failure, hypertension  
  
 magnesium oxide 400 mg tablet Commonly known as:  MAG-OX Take 1 Tab by mouth daily. metoprolol tartrate 25 mg tablet Commonly known as:  LOPRESSOR Take 1 Tab by mouth every twelve (12) hours. Indications: hypertension, VENTRICULAR RATE CONTROL IN ATRIAL FIBRILLATION  
  
 multivitamin tablet Commonly known as:  ONE A DAY Take 1 Tab by mouth daily. oxyCODONE-acetaminophen 5-325 mg per tablet Commonly known as:  PERCOCET Take 1 Tab by mouth every four (4) hours as needed (for moderate to severe pain). Max Daily Amount: 6 Tabs. Indications: Pain PLAVIX 75 mg Tab Generic drug:  clopidogrel Take 75 mg by mouth daily. potassium chloride 20 mEq packet Commonly known as:  KLOR-CON Take 1 Packet by mouth daily. [while on Furosemide]  Indications: hypokalemia prevention  
  
 zinc sulfate 220 (50) mg capsule Commonly known as:  ZINCATE  
take 1 capsule by mouth once daily To-Do List   
 11/09/2017 Lab:  T4 (THYROXINE) 11/09/2017 Lab:  TSH 3RD GENERATION Patient Instructions Continue current medications. If you have any further questions or concerns, please contact our office. [phone] Please provide this summary of care documentation to your next provider. Your primary care clinician is listed as Leticia Rucker. If you have any questions after today's visit, please call [phone].

## 2017-11-09 NOTE — PROGRESS NOTES
PATIENT NAME: Caro Gray         59 y.o.      1960              DATE:11/9/2017    REASON FOR VISIT: Coronary artery disease. Atrial fibrillation    HISTORY OF PRESENT ILLNESS:Denies chest pain and other symptoms suggestive of angina. Denies STEWART, PND, orthopnea. Denies palpitation, syncope, presyncope. Denies edema in the lower extremities. PAST MEDICAL HISTORY:   Past Medical History:  4/4/2017: Acute blood loss as cause of postoperative ane*  No date: Anticoagulated on Coumadin  1/25/2017: Aortoiliac occlusive disease ([address]  1/25/2017: Atherosclerosis of native artery of both lower*  1/26/2015: Benign hypertensive heart disease with systoli*  No date: Carotid artery disease (HCC)  No date: Chronic anemia  No date: Chronic systolic heart failure ([address]  4/4/2017: Chronic ulcer of right foot ([address]  8/8/2017: Chronic ulcer of right heel ([address]  3/15/2017: Coronary artery disease involving native coron*      Comment: Successful stenting of Cx (Xience LESLEY) and RCA               (Xience LESLEY) to 0% by Dr. Alfonso Saul on 3/15/17.  1/26/2015: DDD (degenerative disc disease), lumbar  No date: Dyslipidemia  7/5/2016: Erectile dysfunction  4/6/2017: Euthyroid sick syndrome  11/15/2016: Hereditary peripheral neuropathy  4/18/2017: History of cardioversion      Comment: S/P Synchronized external cardioversion                (4/18/2017 - Dr. Jocy Heredia)  4/22/2017: History of vitamin D deficiency      Comment: Vitamin D 25-Hydroxy (4/22/2017) = 12.0;                Vitamin D 25-Hydroxy (5/26/2017) = 38.7  7/24/2017: Infection of vascular bypass graft ([facility]      Comment: Left lower extremity  No date: Ischemic cardiomyopathy  7/23/2017:  Moderate to severe pulmonary hypertension  No date: Paroxysmal atrial fibrillation ([address]  1/25/2017: Peripheral artery disease (HCC)  No date: Skin ulcer of malleolar area of right ankle (H*  5/4/2015: Spinal stenosis of lumbar region with radiculo*      Comment: Dr. Evelio Coppola [place]    PAST SURGICAL HISTORY:   Past Surgical History:  3/8/2017: CARDIAC CATHETERIZATION      Comment:    3/15/2017: CARDIAC CATHETERIZATION      Comment:    3/15/2017: CORONARY STENT SINGLE W/PTCA      Comment:    08/18/2017: HX ABOVE KNEE AMPUTATION Left      Comment: S/P Left above-the-knee amputation (8/18/2017                - Dr. Alphonse Soto)  06/15/2017: HX ATHERECTOMY Left      Comment: S/P Atherectomy and balloon angioplasty of the               left superficial femoral artery and above-knee                popliteal artery (6/15/2017 - Dr. Vin Noe)  09/07/2017: HX ATHERECTOMY Right      Comment: S/P Atherectomy and balloon angioplasty of the               below-the-knee popliteal artery, above-the-knee               popliteal artery, tibioperoneal trunk artery                and posterior tibial artery (9/7/2017 - Dr. Alphonse Soto)  07/23/2015: HX COLONOSCOPY      Comment: polyps repeat 2020 5 years Luis Henderson  04/04/2017: HX FEMORAL BYPASS Right      Comment: S/P Right axillary to bifemoral artery bypass                using an 8 mm Propaten graft from the right                axilla to the right common femoral artery with                a jump femoral-femoral bypass with another 8 mm               Propaten external ring bypass; Right common                femoral artery, and profunda femoris artery and               superficial femoral artery endarterectomy                causing an extensive surgery on the right side                (4/4/2017 - Dr. Coty Schafer)  04/07/2017: HX FEMORAL BYPASS Right      Comment: S/P Cutdown of femoral-femoral bypass, more on               the left groin side; Right lower extremity                angiography with first-order catheterization                (4/7/2017 - Dr. Alphonse Soto)  04/10/2017: HX FEMORAL BYPASS Right      Comment: S/P Right femoral to above-knee popliteal                bypass with an 8 mm PTFE graft (4/10/2017 - Dr. Clive Kaye)  07/25/2017: HX OTHER SURGICAL Left      Comment: S/P Removal of infected graft; Repair of left                superficial femoral artery; Repair of left                common femoral artery (7/25/2017 - Dr. Andres Romeo)  06/27/2017: HX OTHER SURGICAL Right      Comment: S/P Drainage of right side abscess (6/27/2017                - Dr. Bishop Franz)  07/01/2017: HX OTHER SURGICAL Left      Comment: S/P Left groin exploration; Left femoral                repair pseudoaneurysm; Left wound washout; Wound VAC placement (7/01/2017 - Dr. Anrdes Romeo)  07/04/2017: HX OTHER SURGICAL Left      Comment: S/P Left common femoral artery ligation; Jump                bypass from right to left fem-fem to a more                distal superficial femoral artery using 8 mm                external ringed Propaten graft; Left groin                wound exploration and washout (7/4/2017 - Dr. Bishop Franz)  08/18/2017: HX OTHER SURGICAL Right      Comment: S/P Right axillary femoral jump bypass                interposition; Removal of infected right                axillary femoral bypass;  Wound VAC placement of               upper thigh 1.2 cm x 5.2 cm x 1.8 cm and groin                4 cm x 0.5 cm x 0.2 cm (8/18/2017 - Dr. Andres Romeo)      SOCIAL HISTORY:  Social History    Marital status: LEGALLY    Spouse name:                       Years of education:                 Number of children:               Social History Main Topics    Smoking status: Former Smoker                                                                Packs/day: 0.00      Years: 0.00        Smokeless status: Never Used                        Comment: quit in 2011    Alcohol use: Yes           1.2 oz/week       2 Cans of beer per week       Comment: 10 cans of beer a month    Drug use: Yes                Special: Marijuana       Comment: occasionally-last used 4/17    Sexual activity: Yes               Partners with: Female        ALLERGIES:    -- Morphine -- Other (comments)    --  Patient gets confused with morphine. CURRENT MEDICATIONS:   Current Outpatient Prescriptions:  amiodarone (CORDARONE) 200 mg tablet, take 1 tablet by mouth once daily INDICATION: VENTRICULAR RATE CONTROL IN ATRIAL FIBRILLATION  zinc sulfate (ZINCATE) 220 (50) mg capsule, take 1 capsule by mouth once daily  metoprolol tartrate (LOPRESSOR) 25 mg tablet, Take 1 Tab by mouth every twelve (12) hours. Indications: hypertension, VENTRICULAR RATE CONTROL IN ATRIAL FIBRILLATION  multivitamin (ONE A DAY) tablet, Take 1 Tab by mouth daily. oxyCODONE-acetaminophen (PERCOCET) 5-325 mg per tablet, Take 1 Tab by mouth every four (4) hours as needed (for moderate to severe pain). Max Daily Amount: 6 Tabs. Indications: Pain  gabapentin (NEURONTIN) 300 mg capsule, Take 1 Cap by mouth three (3) times daily. Indications: NEUROPATHIC PAIN  furosemide (LASIX) 20 mg tablet, 1 tablet daily  Indications: Pulmonary Edema due to Chronic Heart Failure  losartan (COZAAR) 25 mg tablet, Take 2 Tabs by mouth daily. Indications: Chronic Heart Failure, hypertension  magnesium oxide (MAG-OX) 400 mg tablet, Take 1 Tab by mouth daily. potassium chloride (KLOR-CON) 20 mEq packet, Take 1 Packet by mouth daily. [while on Furosemide]  Indications: hypokalemia prevention  clopidogrel (PLAVIX) 75 mg tab, Take 75 mg by mouth daily. aspirin 81 mg chewable tablet, Take 1 Tab by mouth daily (with breakfast). atorvastatin (LIPITOR) 40 mg tablet, Take 1 Tab by mouth nightly. Indications: DYSLIPIDEMIA  cyanocobalamin (VITAMIN B-12) 1,000 mcg tablet, Take 1 Tab by mouth daily. No current facility-administered medications for this visit.        REVIEW of SYSTEMS:Cardiovascular ROS: See history of present illness     PHYSICAL EXAMINATION: /76  Pulse 65  Ht 5' 10\" (1.778 m)  Wt 69.4 kg (153 lb)  SpO2 98%  BMI 21.95 kg/m2  BP Readings from Last 3 Encounters:  11/09/17 : 140/76  11/03/17 : 150/86  11/02/17 : 124/68    Pulse Readings from Last 3 Encounters:  11/09/17 : 65  11/03/17 : 62  11/01/17 : (!) 57    Wt Readings from Last 3 Encounters:  11/09/17 : 69.4 kg (153 lb)  11/02/17 : 64.9 kg (143 lb)  10/25/17 : 64.9 kg (143 lb)    Neck: No jugular venous distention. No bruits. Chest: Clear to auscultation. Heart: Regular rhythm. No murmur or gallop. Extremities: Left BKA. No edema right lower extremity      IMPRESSION:   Coronary artery disease, asymptomatic status post PTCA  Ischemic cardiomyopathy, ejection fraction improved on last echo. Briseida from 40% to approximately 50-55%  Paroxysmal atrial fibrillation. Presently in a regular rhythm. Anticoagulation contraindicated due to bleeding complications. PLAN:  T4, TSH. No change in medications  Return to office in 3 months    The diagnoses and plan were discussed with patient and caregiver. All questions answered. Plan of care agreed to by all concerned. Garcia Dixon MD       ,

## 2017-11-10 LAB
T4 SERPL-MCNC: 9 UG/DL (ref 4.5–12)
TSH SERPL DL<=0.005 MIU/L-ACNC: 2.7 UIU/ML (ref 0.45–4.5)

## 2017-11-10 NOTE — PROGRESS NOTES
Done per your note dated 10/9 \"IMPRESSION:   Coronary artery disease, asymptomatic status post PTCA  Ischemic cardiomyopathy, ejection fraction improved on last echo. Briseida from 40% to approximately 50-55%  Paroxysmal atrial fibrillation. Presently in a regular rhythm. Anticoagulation contraindicated due to bleeding complications.     PLAN:  T4, TSH.   No change in medications  Return to office in 3 months\"

## 2017-11-13 ENCOUNTER — TELEPHONE (OUTPATIENT)
Dept: VASCULAR SURGERY | Age: 57
End: 2017-11-13

## 2017-11-13 NOTE — TELEPHONE ENCOUNTER
Faxed over order for patient to start PT , send to reg at fax number below. [facility] from [facility] needs an order faxed over for Mr. Barbara Hanson to start physical therapy tomorrow 11/14/17.    Telephone # [phone]   Fax# [phone]

## 2017-11-14 ENCOUNTER — TELEPHONE (OUTPATIENT)
Dept: VASCULAR SURGERY | Age: 57
End: 2017-11-14

## 2017-11-14 NOTE — TELEPHONE ENCOUNTER
Millie spoke to daughter,  Appointment made for patient to come into the office tomorrow am at 930am to look groin

## 2017-11-14 NOTE — TELEPHONE ENCOUNTER
Daughter called and said one of his incision has opened from where the prosthetic has rubbed on it. Please advise.

## 2017-11-15 ENCOUNTER — OFFICE VISIT (OUTPATIENT)
Dept: VASCULAR SURGERY | Age: 57
End: 2017-11-15

## 2017-11-15 DIAGNOSIS — Z89.612 STATUS POST ABOVE KNEE AMPUTATION OF LEFT LOWER EXTREMITY: Primary | ICD-10-CM

## 2017-11-15 NOTE — PROGRESS NOTES
Patient being seen to have left AKA incision assessed. Incision is healed with skin dry and intact. Incision to medial thigh has small scab noted but patient has started using prosthetic and believe the heat from the sleeve and friction has irritated this area. Daughter has keep area clean and dry and covered. Patient will meet with prosthetic company tomorrow to discuss a solution to this problem, Jean at [employer]. Advised to keep area covered while wearing prosthetic. Right upper chest wound has healed with skin dry and intact as well. Right foot great toenail is starting to come off and has slight drainage noted but toe is WNL, advised to keep toe clean and dry and apply betadine to help dry out rest of toenail and allow toenail to come off on own. Patient was seen by podiatrist last week as well. Patient will call with any further questions or concerns.

## 2017-11-15 NOTE — MR AVS SNAPSHOT
Visit Information Date & Time Provider Department Dept. Phone Encounter #  
 11/15/2017  9:45 AM [facility] NURSE [facility] [id number] Your Appointments 11/20/2017  9:00 AM  
ROUTINE CARE with Ella Luna MD  
0 [facility] ([facility]) Appt Note: routine f/u 2mo [address] [facility] [address] [place] [address] [address] [facility] [address]  
  
    
 12/6/2017  8:00 AM  
PROCEDURE with [facility] IMAGING 2 [facility] ([facility]) Appt Note: AX FEM [address] 200 [facility] [address]  
[phone] 2630 [facility] 1M07  
  
    
 12/6/2017  9:00 AM  
PROCEDURE with [facility] IMAGING 2 [facility] ([facility]) Appt Note: FEM [address] 200 [facility] [address]  
[phone]  
  
    
 12/6/2017 10:00 AM  
PROCEDURE with [facility] NONIMAGING [facility] ([facility]) Appt Note: MARINA [address] 6[address] 200 [facility] [address]  
[phone] [address]  
  
    
 12/13/2017 10:00 AM  
Follow Up with Veronica Vlilarreal [facility] ([facility]) Appt Note: 1 MONTH FOLLOW UP WITH PREP AND STUDIES  
 [facility] [facility] [address]  
[phone] [address]  
  
    
 1/18/2018 10:00 AM  
ROUTINE CARE with Ella Luna MD  
0 [facility] ([facility]) Appt Note: routine f/u 6mo [address] [facility] [address]  
[phone] Upcoming Health Maintenance Date Due COLONOSCOPY 7/23/2020 DTaP/Tdap/Td series (2 - Td) 7/5/2026 Allergies as of 11/15/2017  Review Complete On: 11/2/2017 By: JUNE Dove Severity Noted Reaction Type Reactions Morphine  08/03/2017   Side Effect Other (comments) Patient gets confused with morphine. Current Immunizations  Reviewed on 10/20/2017 Name Date Tdap 7/5/2016 Not reviewed this visit Vitals Smoking Status Former Smoker Preferred Pharmacy Pharmacy Name Phone [facility], [address] Your Updated Medication List  
  
   
This list is accurate as of: 11/15/17  9:51 AM.  Always use your most recent med list.  
  
  
  
  
 amiodarone 200 mg tablet Commonly known as:  CORDARONE  
take 1 tablet by mouth once daily INDICATION: VENTRICULAR RATE CONTROL IN ATRIAL FIBRILLATION  
  
 aspirin 81 mg chewable tablet Take 1 Tab by mouth daily (with breakfast). atorvastatin 40 mg tablet Commonly known as:  LIPITOR Take 1 Tab by mouth nightly. Indications: DYSLIPIDEMIA  
  
 cyanocobalamin 1,000 mcg tablet Commonly known as:  VITAMIN B-12 Take 1 Tab by mouth daily. furosemide 20 mg tablet Commonly known as:  LASIX  
1 tablet daily  Indications: Pulmonary Edema due to Chronic Heart Failure  
  
 gabapentin 300 mg capsule Commonly known as:  NEURONTIN Take 1 Cap by mouth three (3) times daily. Indications: NEUROPATHIC PAIN  
  
 losartan 25 mg tablet Commonly known as:  COZAAR Take 2 Tabs by mouth daily. Indications: Chronic Heart Failure, hypertension  
  
 magnesium oxide 400 mg tablet Commonly known as:  MAG-OX Take 1 Tab by mouth daily. metoprolol tartrate 25 mg tablet Commonly known as:  LOPRESSOR Take 1 Tab by mouth every twelve (12) hours. Indications: hypertension, VENTRICULAR RATE CONTROL IN ATRIAL FIBRILLATION  
  
 multivitamin tablet Commonly known as:  ONE A DAY  
 Take 1 Tab by mouth daily. oxyCODONE-acetaminophen 5-325 mg per tablet Commonly known as:  PERCOCET Take 1 Tab by mouth every four (4) hours as needed (for moderate to severe pain). Max Daily Amount: 6 Tabs. Indications: Pain PLAVIX 75 mg Tab Generic drug:  clopidogrel Take 75 mg by mouth daily. potassium chloride 20 mEq packet Commonly known as:  KLOR-CON Take 1 Packet by mouth daily. [while on Furosemide]  Indications: hypokalemia prevention  
  
 zinc sulfate 220 (50) mg capsule Commonly known as:  ZINCATE  
take 1 capsule by mouth once daily Please provide this summary of care documentation to your next provider. Your primary care clinician is listed as Ella Luna. If you have any questions after today's visit, please call [phone].

## 2017-11-20 ENCOUNTER — OFFICE VISIT (OUTPATIENT)
Dept: FAMILY MEDICINE CLINIC | Age: 57
End: 2017-11-20

## 2017-11-20 VITALS
DIASTOLIC BLOOD PRESSURE: 82 MMHG | HEIGHT: 70 IN | BODY MASS INDEX: 22.05 KG/M2 | RESPIRATION RATE: 16 BRPM | WEIGHT: 154 LBS | SYSTOLIC BLOOD PRESSURE: 134 MMHG | TEMPERATURE: 98.2 F | OXYGEN SATURATION: 100 % | HEART RATE: 79 BPM

## 2017-11-20 DIAGNOSIS — E78.5 DYSLIPIDEMIA: Chronic | ICD-10-CM

## 2017-11-20 DIAGNOSIS — I25.10 CORONARY ARTERY DISEASE INVOLVING NATIVE CORONARY ARTERY OF NATIVE HEART WITHOUT ANGINA PECTORIS: Chronic | ICD-10-CM

## 2017-11-20 DIAGNOSIS — Z89.612 STATUS POST ABOVE KNEE AMPUTATION OF LEFT LOWER EXTREMITY: ICD-10-CM

## 2017-11-20 DIAGNOSIS — I11.0 BENIGN HYPERTENSIVE HEART DISEASE WITH SYSTOLIC CONGESTIVE HEART FAILURE, NYHA CLASS 2 (HCC): Chronic | ICD-10-CM

## 2017-11-20 DIAGNOSIS — I50.22 CHRONIC SYSTOLIC HEART FAILURE (HCC): Chronic | ICD-10-CM

## 2017-11-20 DIAGNOSIS — I73.9 PAD (PERIPHERAL ARTERY DISEASE) (HCC): Primary | ICD-10-CM

## 2017-11-20 DIAGNOSIS — I48.0 PAROXYSMAL ATRIAL FIBRILLATION (HCC): ICD-10-CM

## 2017-11-20 DIAGNOSIS — I50.20 BENIGN HYPERTENSIVE HEART DISEASE WITH SYSTOLIC CONGESTIVE HEART FAILURE, NYHA CLASS 2 (HCC): Chronic | ICD-10-CM

## 2017-11-20 DIAGNOSIS — Z23 ENCOUNTER FOR IMMUNIZATION: ICD-10-CM

## 2017-11-20 RX ORDER — METOPROLOL TARTRATE 25 MG/1
25 TABLET, FILM COATED ORAL EVERY 12 HOURS
Qty: 180 TAB | Refills: 3 | Status: SHIPPED | OUTPATIENT
Start: 2017-11-20 | End: 2018-10-15 | Stop reason: SDUPTHER

## 2017-11-20 RX ORDER — FUROSEMIDE 20 MG/1
TABLET ORAL
Qty: 90 TAB | Refills: 3 | Status: SHIPPED | OUTPATIENT
Start: 2017-11-20 | End: 2018-01-19 | Stop reason: SDUPTHER

## 2017-11-20 RX ORDER — LOSARTAN POTASSIUM 50 MG/1
50 TABLET ORAL DAILY
Qty: 90 TAB | Refills: 2 | Status: SHIPPED | OUTPATIENT
Start: 2017-11-20 | End: 2018-08-20 | Stop reason: SDUPTHER

## 2017-11-20 RX ORDER — LANOLIN ALCOHOL/MO/W.PET/CERES
400 CREAM (GRAM) TOPICAL DAILY
Qty: 90 TAB | Refills: 3 | Status: SHIPPED | OUTPATIENT
Start: 2017-11-20 | End: 2018-12-27

## 2017-11-20 RX ORDER — LOSARTAN POTASSIUM 25 MG/1
50 TABLET ORAL DAILY
Qty: 180 TAB | Refills: 3 | Status: CANCELLED | OUTPATIENT
Start: 2017-11-20

## 2017-11-20 RX ORDER — GLUCOSAMINE SULFATE 1500 MG
POWDER IN PACKET (EA) ORAL DAILY
COMMUNITY
End: 2018-12-27

## 2017-11-20 NOTE — PROGRESS NOTES
Flulaval 0.5 ml given IM in left deltoid. LOt # [id number], exp date 06/30/2018. Patient tolerated injection well. No adverse reaction noted after 15 minute wait time. VIS sheet given to patient.

## 2017-11-20 NOTE — MR AVS SNAPSHOT
Visit Information Date & Time Provider Department Dept. Phone Encounter #  
 11/20/2017  9:00 AM Bridger Velasco, [address] [id number] Follow-up Instructions Return keep appointment in [place]. Your Appointments 12/6/2017  8:00 AM  
PROCEDURE with [facility] 2 [facility] Appt Note: AX FEM [address] [facility]  
[phone] [address] [facility]  
  
    
 12/6/2017  9:00 AM  
PROCEDURE with [facility] IMAG[facility] 2 [facility] Appt Note: FEM FEM [address] [facility]  
[phone]  
  
    
 12/6/2017 10:00 AM  
PROCEDURE with [facility] NONIMAGING [facility] Appt Note: MARINA [address] 016 [address] [facility]  
[phone] [address]  
  
    
 12/13/2017 10:00 AM  
Follow Up with Giovanna Hernandes, [address] [facility] Appt Note: 1 MONTH FOLLOW UP WITH PREP AND STUDIES  
 [address] [place] [address] [address] [facility]  
[phone] [address]  
  
    
 1/18/2018 10:00 AM  
ROUTINE CARE with Bridger Velasco MD  
[facility] Appt Note: routine f/u 6mo [address] [facility] [address] [facility]  
[address] [facility] [address] [facility] Upcoming Health Maintenance Date Due COLONOSCOPY 7/23/2020 DTaP/Tdap/Td series (2 - Td) 7/5/2026 Allergies as of 11/20/2017  Review Complete On: 11/20/2017 By: Trino Lentz LPN Severity Noted Reaction Type Reactions Morphine  08/03/2017   Side Effect Other (comments) Patient gets confused with morphine. Current Immunizations  Reviewed on 10/20/2017 Name Date Tdap 7/5/2016 Not reviewed this visit You Were Diagnosed With   
  
 Codes Comments PAD (peripheral artery disease) (HCC)    -  Primary ICD-10-CM: I73.9 ICD-9-CM: 443.9 Benign hypertensive heart disease with systolic congestive heart failure, NYHA class 2 (HCC)     ICD-10-CM: I11.0, I50.20 ICD-9-CM: 402.11, 428.20, 428.0 Chronic systolic heart failure (HCC)     ICD-10-CM: I50.22 ICD-9-CM: 428.22 Coronary artery disease involving native coronary artery of native heart without angina pectoris     ICD-10-CM: I25.10 ICD-9-CM: 414.01 Dyslipidemia     ICD-10-CM: E78.5 ICD-9-CM: 272.4 Vitals BP Pulse Temp Resp Height(growth percentile) Weight(growth percentile) 134/82 (BP 1 Location: Left arm, BP Patient Position: Sitting) 79 98.2 °F (36.8 °C) (Oral) 16 5' 10\" (1.778 m) 154 lb (69.9 kg) SpO2 BMI Smoking Status 100% 22.1 kg/m2 Former Smoker Vitals History BMI and BSA Data Body Mass Index Body Surface Area  
 22.1 kg/m 2 1.86 m 2 Preferred Pharmacy [facility] Phone [facility] [address] Your Updated Medication List  
  
   
This list is accurate as of: 11/20/17  9:32 AM.  Always use your most recent med list.  
  
  
  
  
 amiodarone 200 mg tablet Commonly known as:  CORDARONE  
take 1 tablet by mouth once daily INDICATION: VENTRICULAR RATE CONTROL IN ATRIAL FIBRILLATION  
  
 aspirin 81 mg chewable tablet Take 1 Tab by mouth daily (with breakfast). atorvastatin 40 mg tablet Commonly known as:  LIPITOR Take 1 Tab by mouth nightly. Indications: DYSLIPIDEMIA  
  
 furosemide 20 mg tablet Commonly known as:  LASIX  
1 tablet daily  Indications: Pulmonary Edema due to Chronic Heart Failure gabapentin 300 mg capsule Commonly known as:  NEURONTIN Take 1 Cap by mouth three (3) times daily. Indications: NEUROPATHIC PAIN  
  
 losartan 50 mg tablet Commonly known as:  COZAAR Take 1 Tab by mouth daily. magnesium oxide 400 mg tablet Commonly known as:  MAG-OX Take 1 Tab by mouth daily. metoprolol tartrate 25 mg tablet Commonly known as:  LOPRESSOR Take 1 Tab by mouth every twelve (12) hours. Indications: hypertension, VENTRICULAR RATE CONTROL IN ATRIAL FIBRILLATION  
  
 multivitamin tablet Commonly known as:  ONE A DAY Take 1 Tab by mouth daily. PLAVIX 75 mg Tab Generic drug:  clopidogrel Take 75 mg by mouth daily. potassium chloride 20 mEq packet Commonly known as:  KLOR-CON Take 1 Packet by mouth daily. [while on Furosemide]  Indications: hypokalemia prevention VITAMIN D3 1,000 unit Cap Generic drug:  cholecalciferol Take  by mouth daily. zinc sulfate 220 (50) mg capsule Commonly known as:  ZINCATE  
take 1 capsule by mouth once daily Prescriptions Sent to Pharmacy Refills  
 metoprolol tartrate (LOPRESSOR) 25 mg tablet 3 Sig: Take 1 Tab by mouth every twelve (12) hours. Indications: hypertension, VENTRICULAR RATE CONTROL IN ATRIAL FIBRILLATION Class: Normal  
 Pharmacy: [address] [place] [address], [address] [place] [address] Ph #: [phone] Route: Oral  
 furosemide (LASIX) 20 mg tablet 3 Si tablet daily  Indications: Pulmonary Edema due to Chronic Heart Failure Class: Normal  
 Pharm[facility]: [facility] [place] [address] [address] [address] Ph #: [phone]  
 magnesium oxide (MAG-OX) 400 mg tablet 3 Sig: Take 1 Tab by mouth daily. Class: Normal  
 Pharmacy: [address] [facility], [address] [address] Ph #: [phone] Route: Oral  
 losartan (COZAAR) 50 mg tablet 2 Sig: Take 1 Tab by mouth daily.   
 Class: Normal  
 [facility] [address] [place] [address] #: [phone] Route: Oral  
  
Follow-up Instructions Return keep appointment in [place]. Please provide this summary of care documentation to your next provider. Your primary care clinician is listed as Wale Reddy. If you have any questions after today's visit, please call [phone].

## 2017-11-20 NOTE — PROGRESS NOTES
1. Have you been to the ER, urgent care clinic since your last visit? Hospitalized since your last visit? No    2. Have you seen or consulted any other health care providers outside of the [address] [place] [address] since your last visit? Include any pap smears or colon screening.  No

## 2017-11-20 NOTE — PROGRESS NOTES
Mandi Rosario, 59 y.o.,  male    SUBJECTIVE  Ff-up    Eventful past few months, PAD L leg graft rejection resulting to Sepsis, eventual removal and AKA of Left leg. Reviewed Vas sx notes, wound vac removed, routine healing. He is in good spirits, had his first bath independently today. He denies any pain, he is on neurontin for neuropathy. Lives with daughter who is caring for him full time. Continues care with podiatry for R leg ulcers, off loading. H/o CAD s/p angioplasty and Afib. Rectal bleeding on coumadin which was discontinued. Denies cp, syncope, palpitations, on amiodarone asa/plavix. Reviewed dr. Concepcion Hinton notes, TSH done normal. Had h/o euthyroid sick syndrome. He is hypertensive, and requesting refills on medication. Following dr. Katie Matias for thrombocytosis, thought to be due to anemia secondary to blood loss. He in on po MVT with fe, he had fe infusions previously. Has an upcoming appt.      ROS:  See HPI, all others negative        Patient Active Problem List   Diagnosis Code    Benign hypertensive heart disease with systolic congestive heart failure, NYHA class 2 ([facility]) I11.0, I50.20    DDD (degenerative disc disease), lumbar M51.36    Erectile dysfunction N52.9    Spinal stenosis of lumbar region with radiculopathy M48.061, M54.16    Hereditary peripheral neuropathy G60.9    Aortoiliac occlusive disease ([address] I74.09    Atherosclerosis of native artery of both lower extremities with intermittent claudication ([address] I70.213    Peripheral artery disease ([address] I73.9    Coronary artery disease involving native coronary artery of native heart I25.10    Paroxysmal atrial fibrillation (HCC) I48.0    Acute blood loss as cause of postoperative anemia D62    Impaired mobility and ADLs Z74.09    Ischemic cardiomyopathy I25.5    Chronic systolic heart failure (HCC) I50.22    Carotid artery disease ([facility]) I77.9    Dyslipidemia E78.5    Euthyroid sick syndrome E07.81    Aftercare following surgery of the circulatory system Z48.812    Status post femoral-popliteal bypass surgery Z95.828    History of cardioversion Z98.890    Critical ischemia of lower extremity I99.8    History of vitamin D deficiency Z86.39    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 7XXA    Chronic anemia D64.9    Chronic ulcer of right heel ([facility]) L97.419    Ischemic rest pain of lower extremity ([facility]) I73.9    Prolonged Q-T interval on ECG R94.31    Status post above knee am[facility] of left lower [facility] G98.609    Aftercare for amputation stump Z47.81    Moderate to severe pulmonary hypertension I27.20    Adverse effect of amiodarone T46.2X5A    Skin ulcer of malleolar area of right ankle ([facility]) L97.319       Current Outpatient Prescriptions   Medication Sig Dispense Refill    cholecalciferol (VITAMIN D3) 1,000 unit cap Take  by mouth daily.  metoprolol tartrate (LOPRESSOR) 25 mg tablet Take 1 Tab by mouth every twelve (12) hours. Indications: hypertension, VENTRICULAR RATE CONTROL IN ATRIAL FIBRILLATION 180 Tab 3    furosemide (LASIX) 20 mg tablet 1 tablet daily  Indications: Pulmonary Edema due to Chronic Heart Failure 90 Tab 3    magnesium oxide (MAG-OX) 400 mg tablet Take 1 Tab by mouth daily. 90 Tab 3    losartan (COZAAR) 50 mg tablet Take 1 Tab by mouth daily. 90 Tab 2    amiodarone (CORDARONE) 200 mg tablet take 1 tablet by mouth once daily INDICATION: VENTRICULAR RATE CONTROL IN ATRIAL FIBRILLATION 90 Tab 3    zinc sulfate (ZINCATE) 220 (50) mg capsule take 1 capsule by mouth once daily 90 Cap 0    multivitamin (ONE A DAY) tablet Take 1 Tab by mouth daily.  gabapentin (NEURONTIN) 300 mg capsule Take 1 Cap by mouth three (3) times daily. Indications: NEUROPATHIC PAIN 84 Cap 3    potassium chloride (KLOR-CON) 20 mEq packet Take 1 Packet by mouth daily.  [while on Furosemide]  Indications: hypokalemia prevention 30 Packet 0    clopidogrel (PLAVIX) 75 mg tab Take 75 mg by mouth daily.  aspirin 81 mg chewable tablet Take 1 Tab by mouth daily (with breakfast). 90 Tab 3    atorvastatin (LIPITOR) 40 mg tablet Take 1 Tab by mouth nightly. Indications: DYSLIPIDEMIA 90 Tab 3       Allergies   Allergen Reactions    Morphine Other (comments)     Patient gets confused with morphine. Past Medical History:   Diagnosis Date    Acute blood loss as cause of postoperative anemia 4/4/2017    Anticoagulated on Coumadin     Aortoiliac occlusive disease (HCC) 1/25/2017    Atherosclerosis of native artery of both lower extremities with intermittent claudication ([address] 1/25/2017    Benign hypertensive heart disease with systolic congestive heart failure, NYHA class 2 ([address] 1/26/2015    Carotid artery disease (HCC)     Chronic anemia     Chronic systolic heart failure (HCC)     Chronic ulcer of right foot ([address] 4/4/2017    Chronic ulcer of right heel ([address] 8/8/2017    Coronary artery disease involving native coronary artery of native heart 3/15/2017    Successful stenting of Cx (Xience LESLEY) and RCA (Xience LESLEY) to 0% by Dr. Meenakshi Baer on 3/15/17.     DDD (degenerative disc disease), lumbar 1/26/2015    Dyslipidemia     Erectile dysfunction 7/5/2016    Euthyroid sick syndrome 4/6/2017    Hereditary peripheral neuropathy 11/15/2016    History of cardioversion 4/18/2017    S/P Synchronized external cardioversion (4/18/2017 - Dr. Waleska Flood)    History of vitamin D deficiency 4/22/2017    Vitamin D 25-Hydroxy (4/22/2017) = 12.0; Vitamin D 25-Hydroxy (5/26/2017) = 38.7    Infection of vascular bypass graft ([address] 7/24/2017    Left lower extremity    Ischemic cardiomyopathy     Moderate to severe pulmonary hypertension 7/23/2017    Paroxysmal atrial fibrillation (HCC)     Peripheral artery disease ([address] 1/25/2017    Skin ulcer of malleolar area of right ankle ([address]     Spinal stenosis of lumbar region with radiculopathy 5/4/2015     Andres Perry       Social History     Social History    Marital status: LEGALLY      Spouse name: N/A    Number of children: N/A    Years of education: N/A     Occupational History    Not on file. Social History Main Topics    Smoking status: Former Smoker    Smokeless tobacco: Never Used      Comment: quit in 2011    Alcohol use 1.2 oz/week     2 Cans of beer per week      Comment: 10 cans of beer a month    Drug use: Yes     Special: Marijuana      Comment: occasionally-last used 4/17    Sexual activity: Yes     Partners: Female     Other Topics Concern    Not on file     Social History Narrative       Family History   Problem Relation Age of Onset    Heart Disease Father          OBJECTIVE    Physical Exam:     Visit Vitals    /82 (BP 1 Location: Left arm, BP Patient Position: Sitting)    Pulse 79    Temp 98.2 °F (36.8 °C) (Oral)    Resp 16    Ht 5' 10\" (1.778 m)    Wt 154 lb (69.9 kg)    SpO2 100%    BMI 22.1 kg/m2       General: alert, in no apparent distress or pain  Neck: supple, no adenopathy palpated  CVS: normal rate, regular rhythm, distinct S1 and S2  Lungs:clear to ausculation bilaterally, no crackles, wheezing or rhonchi noted  Extremities: L AKA stump without discharge or bleeding. Skin: warm, no lesions, rashes noted  Psych:  mood and affect normal        ASSESSMENT/PLAN  Diagnoses and all orders for this visit:    1. PAD (peripheral artery disease) ([facility])  S/p L AKA, off wound vac  On ASA, plavix, statin  Following Vasc surgery closely, and podiatry for R foot ulcers    2. Benign hypertensive heart disease with systolic congestive heart failure, NYHA class 2 ([facility])  Appears compensated  Cont lasix, on BB and ARB  -     furosemide (LASIX) 20 mg tablet; 1 tablet daily  Indications: Pulmonary Edema due to Chronic Heart Failure    3.  Chronic systolic heart failure (HCC)  -     furosemide (LASIX) 20 mg tablet; 1 tablet daily  Indications: Pulmonary Edema due to Chronic Heart Failure    4. Coronary artery disease involving native coronary artery of native heart without angina pectoris  S/p stent  On ASA, plavix, BB, ARB, statin  Cont care with dr. Sharia Brittle    5. Dyslipidemia  LDL goal is <70,   On lipitor, will recheck on next visit    6. Status post above knee amputation of left lower extremity (HCC)    7. Paroxysmal atrial fibrillation (HCC)  Rate controlled, anticoag is contraindicated due to bleeding  On amiodarone and BB    Other orders  -     metoprolol tartrate (LOPRESSOR) 25 mg tablet; Take 1 Tab by mouth every twelve (12) hours. Indications: hypertension, VENTRICULAR RATE CONTROL IN ATRIAL FIBRILLATION  -     magnesium oxide (MAG-OX) 400 mg tablet; Take 1 Tab by mouth daily. -     losartan (COZAAR) 50 mg tablet; Take 1 Tab by mouth daily. Follow-up Disposition:  Return keep appointment in [place]. Patient understands plan of care. Patient has provided input and agrees with goals.

## 2017-12-06 ENCOUNTER — OFFICE VISIT (OUTPATIENT)
Dept: VASCULAR SURGERY | Age: 57
End: 2017-12-06

## 2017-12-06 DIAGNOSIS — I74.09 AORTOILIAC OCCLUSIVE DISEASE (HCC): Chronic | ICD-10-CM

## 2017-12-06 DIAGNOSIS — I70.213 ATHEROSCLEROSIS OF NATIVE ARTERY OF BOTH LOWER EXTREMITIES WITH INTERMITTENT CLAUDICATION (HCC): Chronic | ICD-10-CM

## 2017-12-06 NOTE — PROCEDURES
[facility]   *** FINAL REPORT ***    Name: Ronnie Starr  MRN: [id number]       Outpatient  : 22 Mar 1960  HIS Order #: [id number]  [id number] [facility] Visit #: [id number]  Date: 06 Dec 2017    TYPE OF TEST: Bypass Graft Duplex    REASON FOR TEST  PVD, f/u Revasc    Graft:-  Summary:   Right common femoral - popliteal (above knee) bypass with  ptfe  Op. Date:  2017  Surgeon:   Jennifer Munson    Results:-            Velocity  Ratio  Stenosis         Waveform            --------  -----  --------         ----------  Inflow:    146.0  Proximal:  125.0      0.9  Normal           Biphasic  Upper:      54.0      0.4  Normal           Biphasic  Mid-graft:  75.0      1.4  Normal           Biphasic  Lower:      59.0      0.8  Normal           Biphasic  Distal:     97.0      1.6  Normal           Biphasic  Outflow:    81.0      0.8  Normal           Biphasic    MARINA:    INTERPRETATION/FINDINGS  Duplex images were obtained using 2-D gray scale, color flow and  spectral doppler analysis. Duplex exam of the bypass graft reveals :  1. Patent right common femoral artery to popliteal artery bypass graft   without significant stenosis. 2. Poorly biphasic signals noted throughout. 3. Right MARINA suggest moderate arterial insufficiency at rest.  The  right ankle/brachial index is 0.84. ADDITIONAL COMMENTS    I have personally reviewed the data relevant to the interpretation of  this  study. TECHNOLOGIST: Alberto Weller RVT, BS  Signed: 2017 09:38 AM    PHYSICIAN: Keyshawn Martino MD  Signed: 2017 02:01 PM

## 2017-12-06 NOTE — PROCEDURES
[facility]   *** FINAL REPORT ***    Name: Shannon Jaramillo  MRN: [id number]       Outpatient  : 22 Mar 1960  HIS Order #: [id number]  [id number] [facility] Visit #: [id number]  Date: 06 Dec 2017    TYPE OF TEST: Peripheral Arterial Testing    REASON FOR TEST  Peripheral vascular dz NOS    Right Leg  Segmentals: Abnormal                     mmHg  Brachial         162  High thigh  Low thigh  Calf             124  Posterior tibial 118  Dorsalis pedis   137  Peroneal  Metatarsal  Toe pressure      68  Doppler:    Abnormal  Ankle/Brachial: 0.84    Left Leg  Segmentals:                     mmHg  Brachial         164  High thigh  Low thigh  Calf  Posterior tibial  Dorsalis pedis  Peroneal  Metatarsal  Toe pressure  Doppler:    Not examined  Ankle/Brachial:    INTERPRETATION/FINDINGS  Physiologic testing was performed using continuous wave doppler and  segmental pressures. ABIs only:  1. Moderate peripheral arterial disease indicated at rest in the right   leg. 2. Left AKA noted. 3. The right ankle/brachial index is 0.84.  4. The left DBI is 0.41. ADDITIONAL COMMENTS    I have personally reviewed the data relevant to the interpretation of  this  study. TECHNOLOGIST: Annette Weller RVT, BS  Signed: 2017 09:34 AM    PHYSICIAN: Maximiliano Buchanan MD  Signed: 2017 02:02 PM

## 2017-12-06 NOTE — PROCEDURES
[facility]   *** FINAL REPORT ***    Name: Osmany Mcarthur  MRN: [id number]       Outpatient  : 22 Mar 1960  HIS Order #: [id number]  [id number] [facility] Visit #: [id number]  Date: 06 Dec 2017    TYPE OF TEST: Aorto-Iliac Duplex    REASON FOR TEST  Peripheral Arterial Disease    B-Mode:-                 (cm)   1     2     3  Aortic diameter:         AP:                           TV:  Common iliac diameter:   Right:                           Left:    Duplex:-                           PSV  Stenosis                           ----- --------------------  Aorta: (1)         (2)         (3)    Right common iliac:  Right external iliac:    Left common iliac:  Left external iliac:    INTERPRETATION/FINDINGS  Duplex images were obtained using 2-D gray scale, color flow and  spectral doppler analysis. RT Ax Fem BP. Patent right axillary to right common femoral artery bypass graft  without significant stenosis. 2. Biphasic signals noted throughout. 3. Right MARINA suggest moderate arterial insufficiency at rest.  The  right MARINA is 0.84.  4. Joanie-graft collection noted above the hip level. ADDITIONAL COMMENTS  Inflow: 135 cm/sec    Prx Anast: 186 cm/sec   Prx: 221 cm/sec     Axillary: 140 cm/sec     Below: 113 cm/sec     Ribline: 140 cm/sec    Below: 152 cm/sec         Hip: 133 cm/sec    Below: 146 cm/sec    Dst:  140 cm/sec    Dst   Anast: 146 cm/sec             Outflow 48 cm/sec (RT FemPop BPG)    I have personally reviewed the data relevant to the interpretation of  this  study. TECHNOLOGIST: Florecita Weller RVT, BS  Signed: 2017 09:52 AM    PHYSICIAN: Suzy Lyles MD  Signed: 2017 02:02 PM

## 2017-12-14 ENCOUNTER — OFFICE VISIT (OUTPATIENT)
Dept: VASCULAR SURGERY | Age: 57
End: 2017-12-14

## 2017-12-14 VITALS
HEIGHT: 70 IN | BODY MASS INDEX: 22.05 KG/M2 | HEART RATE: 76 BPM | SYSTOLIC BLOOD PRESSURE: 140 MMHG | DIASTOLIC BLOOD PRESSURE: 76 MMHG | WEIGHT: 154 LBS

## 2017-12-14 DIAGNOSIS — I70.213 ATHEROSCLEROSIS OF NATIVE ARTERY OF BOTH LOWER EXTREMITIES WITH INTERMITTENT CLAUDICATION (HCC): Chronic | ICD-10-CM

## 2017-12-14 DIAGNOSIS — I74.09 AORTOILIAC OCCLUSIVE DISEASE (HCC): Primary | Chronic | ICD-10-CM

## 2017-12-14 DIAGNOSIS — Z89.612 STATUS POST ABOVE KNEE AMPUTATION OF LEFT LOWER EXTREMITY: ICD-10-CM

## 2017-12-14 DIAGNOSIS — I77.9 BILATERAL CAROTID ARTERY DISEASE (HCC): Chronic | ICD-10-CM

## 2017-12-14 NOTE — PROGRESS NOTES
56-year-old   Discussed that we will contact prosthesis today to get him moving forward with the prosthetic process. I did call Jean with [employer] and he will contact patient. The wounds on his right foot are healed at this point as well. He continues to follow with Podiatry as well. He does have chronic pinpoint opening on his right chest (overlying ax-fem bypass). Scant clear drainage. No signs of infection. Overall he feels that he is doing much better and continues to grow stronger day by day. He has gained some weight back and is eating well. He also has history of right axillary femoral jump bypass interposition and removal of infected right axillary femoral bypass  Discussed that we will obtain follow-up ultrasounds of his bypasses within the 3-4 weeks and he will follow-up afterwards. Hopefully he will have started his prosthetic process at that time and we can assess how he is doing with this as well. Patient understands to call us sooner as needed. He will continue his Plavix and aspirin as well. Summer Patel    Chief Complaint   Patient presents with    Leg Pain       History and Physical    Summer Patel is a 59 y.o. gentleman with complicated vascular history and multiple vascular surgeries. He has left AKA. He suffered a fall after his hospital discharge and developed a large hematoma that arose from his stump site. He underwent washout of hematoma in the OR with wound vac placement. His incision is completely healed at this time. He presents today in follow up after vascular studies. He continues to do well. He is somewhat frustrated today in the office as he has not yet received his prosthetic limb. He is working with the prosthetic company and reportedly insurance is still reviewing. This seems to be quite frustrating to him. He denies any pain at this time. He states that his right leg continues to do well. He denies any open wounds and denies any rest pain.   He denies any pain with ambulation or transferring. No fevers or chills. He states that his groin incisions have healed well and he has no concerns at this time. Past Medical History:   Diagnosis Date    Acute blood loss as cause of postoperative anemia 4/4/2017    Anticoagulated on Coumadin     Aortoiliac occlusive disease (HCC) 1/25/2017    Atherosclerosis of native artery of both lower extremities with intermittent claudication ([address] 1/25/2017    Benign hypertensive heart disease with systolic congestive heart failure, NYHA class 2 ([address] 1/26/2015    Carotid artery disease (HCC)     Chronic anemia     Chronic systolic heart failure (HCC)     Chronic ulcer of right foot ([address] 4/4/2017    Chronic ulcer of right heel ([address] 8/8/2017    Coronary artery disease involving native coronary artery of native heart 3/15/2017    Successful stenting of Cx (Xience LESLEY) and RCA (Xience LESLEY) to 0% by Dr. Brendan Martino on 3/15/17.     DDD (degenerative disc disease), lumbar 1/26/2015    Dyslipidemia     Erectile dysfunction 7/5/2016    Euthyroid sick syndrome 4/6/2017    Hereditary peripheral neuropathy 11/15/2016    History of cardioversion 4/18/2017    S/P Synchronized external cardioversion (4/18/2017 - Dr. Cony Loera)    History of vitamin D deficiency 4/22/2017    Vitamin D 25-Hydroxy (4/22/2017) = 12.0; Vitamin D 25-Hydroxy (5/26/2017) = 38.7    Infection of vascular bypass graft ([address] 7/24/2017    Left lower extremity    Ischemic cardiomyopathy     Moderate to severe pulmonary hypertension 7/23/2017    Paroxysmal atrial fibrillation (HCC)     Peripheral artery disease ([address] 1/25/2017    Skin ulcer of malleolar area of right ankle ([address]     Spinal stenosis of lumbar region with radiculopathy 5/4/2015    Dr. Dulce Alvarenga     Past Surgical History:   Procedure Laterality Date    CARDIAC CATHETERIZATION  3/8/2017         CARDIAC CATHETERIZATION  3/15/2017         CORONARY STENT SINGLE W/PTCA 3/15/2017         HX ABOVE KNEE AMPUTATION Left 08/18/2017    S/P Left above-the-knee amputation (8/18/2017 - Dr. Carlyn Holstein)    HX ATHERECTOMY Left 06/15/2017    S/P Atherectomy and balloon angioplasty of the left superficial femoral artery and above-knee popliteal artery (6/15/2017 - Dr. Carlyn Holstein)    HX ATHERECTOMY Right 09/07/2017    S/P Atherectomy and balloon angioplasty of the below-the-knee popliteal artery, above-the-knee popliteal artery, tibioperoneal trunk artery and posterior tibial artery (9/7/2017 - Dr. Carlyn Holstein)    HX COLONOSCOPY  07/23/2015    polyps repeat 2020 5 years [address] Right 04/04/2017    S/P Right axillary to bifemoral artery bypass using an 8 mm Propaten graft from the right axilla to the right common femoral artery with a jump femoral-femoral bypass with another 8 mm Propaten external ring bypass; Right common femoral artery, and profunda femoris artery and superficial femoral artery endarterectomy causing an extensive surgery on the right side (4/4/2017 - Dr. Claudia Vaz)    [address] Right 04/07/2017    S/P Cutdown of femoral-femoral bypass, more on the left groin side; Right lower extremity angiography with first-order catheterization (4/7/2017 - Dr. Carlyn Holstein)    HX FEMORAL BYPASS Right 04/10/2017    S/P Right femoral to above-knee popliteal bypass with an 8 mm PTFE graft (4/10/2017 - Dr. Cameron Pillai)    HX OTHER SURGICAL Left 07/25/2017    S/P Removal of infected graft; Repair of left superficial femoral artery; Repair of left common femoral artery (7/25/2017 - Dr. Carlyn Holstein)    HX OTHER SURGICAL Right 06/27/2017    S/P Drainage of right side abscess (6/27/2017 - Dr. Carlyn Holstein)    HX OTHER SURGICAL Left 07/01/2017    S/P Left groin exploration; Left femoral repair pseudoaneurysm; Left wound washout;  Wound VAC placement (7/01/2017 - Dr. Carlyn Holstein)    HX OTHER SURGICAL Left 07/04/2017    S/P Left common femoral artery ligation; Jump bypass from right to left fem-fem to a more distal superficial femoral artery using 8 mm external ringed Propaten graft; Left groin wound exploration and washout (7/4/2017 - Dr. Rona Bernardo)    HX OTHER SURGICAL Right 08/18/2017    S/P Right axillary femoral jump bypass interposition; Removal of infected right axillary femoral bypass; Wound VAC placement of upper thigh 1.2 cm x 5.2 cm x 1.8 cm and groin 4 cm x 0.5 cm x 0.2 cm (8/18/2017 - Dr. Rona Bernardo)     Patient Active Problem List   Diagnosis Code    Benign hypertensive heart disease with systolic congestive heart failure, NYHA class 2 ([facility]) I11.0, I50.20    DDD (degenerative disc disease), lumbar M51.36    Erectile dysfunction N52.9    Spinal stenosis of lumbar region with radiculopathy M48.061, M54.16    Hereditary peripheral neuropathy G60.9    Aortoiliac occlusive disease ([address] I74.09    Atherosclerosis of native artery of both lower extremities with intermittent claudication ([address] I70.213    Peripheral artery disease ([address] I73.9    Coronary artery disease involving native coronary artery of native heart I25.10    Paroxysmal atrial fibrillation ([facility]) I48.0    Acute blood loss as cause of postoperative anemia D62    Impaired mobility and ADLs Z74.09    Ischemic cardiomyopathy I25.5    Chronic systolic heart failure ([facility]) I50.22    Carotid artery disease ([facility]) I77.9    Dyslipidemia E78.5    Euthyroid sick syndrome E07.81    Aftercare following surgery of the circulatory system Z48.812    Status post femoral-popliteal bypass surgery Z95.828    History of cardioversion Z98.890    Critical ischemia of lower extremity I99.8    History of vitamin D deficiency Z86.39    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 7XXA    Chronic anemia D64.9    Chronic ulcer of right heel ([address] L97.419    Ischemic rest pain of lower extremity ([facility]) I73.9    Prolonged Q-T interval on ECG R94.31    Status post above knee amputation of left lower extremity ([address] T17.676    Aftercare for amputation stump Z47.81    Moderate to severe pulmonary hypertension I27.20    Adverse effect of amiodarone T46.2X5A    Skin ulcer of malleolar area of right ankle ([facility]) L97.319     Current Outpatient Prescriptions   Medication Sig Dispense Refill    cholecalciferol (VITAMIN D3) 1,000 unit cap Take  by mouth daily.  metoprolol tartrate (LOPRESSOR) 25 mg tablet Take 1 Tab by mouth every twelve (12) hours. Indications: hypertension, VENTRICULAR RATE CONTROL IN ATRIAL FIBRILLATION 180 Tab 3    furosemide (LASIX) 20 mg tablet 1 tablet daily  Indications: Pulmonary Edema due to Chronic Heart Failure 90 Tab 3    magnesium oxide (MAG-OX) 400 mg tablet Take 1 Tab by mouth daily. 90 Tab 3    losartan (COZAAR) 50 mg tablet Take 1 Tab by mouth daily. 90 Tab 2    amiodarone (CORDARONE) 200 mg tablet take 1 tablet by mouth once daily INDICATION: VENTRICULAR RATE CONTROL IN ATRIAL FIBRILLATION 90 Tab 3    zinc sulfate (ZINCATE) 220 (50) mg capsule take 1 capsule by mouth once daily 90 Cap 0    multivitamin (ONE A DAY) tablet Take 1 Tab by mouth daily.  gabapentin (NEURONTIN) 300 mg capsule Take 1 Cap by mouth three (3) times daily. Indications: NEUROPATHIC PAIN 84 Cap 3    potassium chloride (KLOR-CON) 20 mEq packet Take 1 Packet by mouth daily. [while on Furosemide]  Indications: hypokalemia prevention 30 Packet 0    clopidogrel (PLAVIX) 75 mg tab Take 75 mg by mouth daily.  aspirin 81 mg chewable tablet Take 1 Tab by mouth daily (with breakfast). 90 Tab 3    atorvastatin (LIPITOR) 40 mg tablet Take 1 Tab by mouth nightly. Indications: DYSLIPIDEMIA 90 Tab 3     Allergies   Allergen Reactions    Morphine Other (comments)     Patient gets confused with morphine.        Physical Exam:    Visit Vitals    /76 (BP 1 Location: Right arm, BP Patient Position: Sitting)    Pulse 76    Ht 5' 10\" (1.778 m)    Wt 154 lb (69.9 kg)    BMI 22.1 kg/m2      General: Well-appearing male in no acute distress. Pt is in wheelchair  HEENT: EOMI, no scleral icterus is noted. Pulmonary: No increased work or breathing is noted. Abdomen: nondistended. Extremities: s/p left AKA. Incision well healed. Groin incisions well healed, soft. No redness or tenderness. Right ax-fem bypass site with no signs of infection. No abscess. Neuro: Cranial nerves II through XII are grossly intact   Integument: No ulcerations are identified visibly      INTERPRETATION/FINDINGS  Duplex images were obtained using 2-D gray scale, color flow and  spectral doppler analysis. RT Ax Fem BP. Patent right axillary to right common femoral artery bypass graft  without significant stenosis. 2. Biphasic signals noted throughout. 3. Right MARINA suggest moderate arterial insufficiency at rest.  The  right MARINA is 0.84.  4. Joanie-graft collection noted above the hip level. INTERPRETATION/FINDINGS  Duplex images were obtained using 2-D gray scale, color flow and  spectral doppler analysis. Duplex exam of the bypass graft reveals :  1. Patent right common femoral artery to popliteal artery bypass graft   without significant stenosis. 2. Poorly biphasic signals noted throughout. 3. Right MARINA suggest moderate arterial insufficiency at rest.  The  right ankle/brachial index is 0.84. Impression and Plan:  Mary Tolliver is a 59 y.o. male with complicated vascular history requiring multiple vascular procedures. He is status post left AKA. Currently he has a right axillary -femoral artery bypass and right common femoral to popliteal artery bypass. Imaging was reviewed along with patient and his daughter in the office today. His axillary to femoral bypass is patent with no evidence of significant stenosis.   There is report of perigraft fluid collection noted in the right groin. Patient denies any pain, redness or symptoms of illness. No signs of infection on exam. He states the only thing he has is a cough but otherwise is feeling quite well. Discussed that we will continue to monitor him closely and will repeat arterial studies in 3 months. His right fem-pop bypass is patent with poorly biphasic signals throughout. He does not describe any rest pain and no symptoms of claudication with exertion. I did discuss that we will continue to monitor this closely as well and that he may ultimately require repeat angiogram if he develops any signs of worsening arterial insufficiency. He also has a history of left carotid artery occlusion and mild arterial stenosis of the right carotid artery. We will also obtain follow-up carotid studies in 3 months. Discussed that we will continue to monitor him closely and he will call the office with any new or concerning symptoms. Patient and daughter both agree to plan. He will continue his antiplatelet therapy with Plavix and aspirin    Follow-up Disposition:  Return in about 3 months (around 3/14/2018). Patel Cat  [phone]        PLEASE NOTE:  This document has been produced using voice recognition software. Unrecognized errors in transcription may be present.

## 2017-12-14 NOTE — MR AVS SNAPSHOT
Visit Information Date & Time Provider Department Dept. Phone Encounter #  
 12/14/2017  9:45 AM [facility] [phone] [id number] Follow-up Instructions Return in about 3 months (around 3/14/2018). Your Appointments 1/18/2018 10:00 AM  
ROUTINE CARE with Eli Connelly MD  
[facility] ([facility]) Appt Note: routine f/u 6mo [address] [facility]  
  
    
 3/16/2018  8:00 AM  
PROCEDURE with [facility] ([facility]) Appt Note: ax fem 3mos wild [address] [facility] [address] [facility] [address]  
[phone] [address] [facility]  
  
    
 3/16/2018  9:00 AM  
PROCEDURE with [facility] 2 [facility] ([facility]) Appt Note: cv  3mos wild [address] [facility] [address] [facility] [address]  
[phone] 3/16/2018 10:00 AM  
PROCEDURE with [facility] 2 [facility] ([facility]) Appt Note: fem fem 3mos wild [address] [facility] [address] [facility] [address]  
[phone]  
  
    
 3/29/2018 10:00 AM  
Follow Up with [address] [facility] ([facility]) Appt Note: 3 month follow up after studies with prep [address] [facility] [address]  
[phone] [address] [facility] [facility] [facility] [address] Upcoming Health Maintenance Date Due COLONOSCOPY 7/23/2020 DTaP/Tdap/Td series (2 - Td) 7/5/2026 Allergies as of 12/14/2017  Review Complete On: 12/14/2017 By: Alejandro Hoff LPN Severity Noted Reaction Type Reactions Morphine  08/03/2017   Side Effect Other (comments) Patient gets confused with morphine. Current Immunizations  Reviewed on 11/20/2017 Name Date Influenza Vaccine (Quad) PF 11/20/2017 Tdap 7/5/2016 Not reviewed this visit You Were Diagnosed With   
  
 Codes Comments Aortoiliac occlusive disease ([facility]    -  Primary ICD-10-CM: I74.09 
ICD-9-CM: 444.09 Atherosclerosis of native artery of both lower extremities with intermittent claudication ([facility]     ICD-10-CM: L78.911 ICD-9-CM: 440.21 Bilateral carotid artery disease ([facility]     ICD-10-CM: I77.9 ICD-9-CM: 261. 9 Vitals BP Pulse Height(growth percentile) Weight(growth percentile) BMI Smoking Status 140/76 (BP 1 Location: Right arm, BP Patient Position: Sitting) 76 5' 10\" (1.778 m) 154 lb (69.9 kg) 22.1 kg/m2 Former Smoker BMI and BSA Data Body Mass Index Body Surface Area  
 22.1 kg/m 2 1.86 m 2 Preferred Pharmacy Pharmacy Name Phone [facility] [address] Your Updated Medication List  
  
   
This list is accurate as of: 12/14/17 10:28 AM.  Always use your most recent med list.  
  
  
  
  
 amiodarone 200 mg tablet Commonly known as:  CORDARONE  
take 1 tablet by mouth once daily INDICATION: VENTRICULAR RATE CONTROL IN ATRIAL FIBRILLATION  
  
 aspirin 81 mg chewable tablet Take 1 Tab by mouth daily (with breakfast). atorvastatin 40 mg tablet Commonly known as:  LIPITOR Take 1 Tab by mouth nightly. Indications: DYSLIPIDEMIA  
  
 furosemide 20 mg tablet Commonly known as:  LASIX  
1 tablet daily  Indications: Pulmonary Edema due to Chronic Heart Failure  
  
 gabapentin 300 mg capsule Commonly known as:  NEURONTIN Take 1 Cap by mouth three (3) times daily. Indications: NEUROPATHIC PAIN  
  
 losartan 50 mg tablet Commonly known as:  COZAAR Take 1 Tab by mouth daily. magnesium oxide 400 mg tablet Commonly known as:  MAG-OX Take 1 Tab by mouth daily. metoprolol tartrate 25 mg tablet Commonly known as:  LOPRESSOR Take 1 Tab by mouth every twelve (12) hours. Indications: hypertension, VENTRICULAR RATE CONTROL IN ATRIAL FIBRILLATION  
  
 multivitamin tablet Commonly known as:  ONE A DAY Take 1 Tab by mouth daily. PLAVIX 75 mg Tab Generic drug:  clopidogrel Take 75 mg by mouth daily. potassium chloride 20 mEq packet Commonly known as:  KLOR-CON Take 1 Packet by mouth daily. [while on Furosemide]  Indications: hypokalemia prevention VITAMIN D3 1,000 unit Cap Generic drug:  cholecalciferol Take  by mouth daily. zinc sulfate 220 (50) mg capsule Commonly known as:  ZINCATE  
take 1 capsule by mouth once daily Follow-up Instructions Return in about 3 months (around 3/14/2018). To-Do List   
 02/02/2018 Imaging:  MARINA WBI LTD SINGLE LEVEL AMB   
  
 03/02/2018 Imaging:  DUPLEX AORTA ILIAC GRAFT LTD LT AMB   
  
 03/02/2018 Imaging:  DUPLEX CAROTID BILATERAL AMB   
  
 03/02/2018 Imaging:  DUPLEX LOW EXT ARTERY / GRAFTS LTD RIGHT AMB Please provide this summary of care documentation to your next provider. Your primary care clinician is listed as Josette Guerin. If you have any questions after today's visit, please call [phone].

## 2017-12-19 RX ORDER — ZINC SULFATE 50(220)MG
CAPSULE ORAL
Qty: 90 CAP | Refills: 0 | Status: SHIPPED | OUTPATIENT
Start: 2017-12-19 | End: 2018-03-03 | Stop reason: SDUPTHER

## 2018-01-10 ENCOUNTER — OFFICE VISIT (OUTPATIENT)
Dept: VASCULAR SURGERY | Age: 58
End: 2018-01-10

## 2018-01-10 DIAGNOSIS — I70.229 CRITICAL ISCHEMIA OF LOWER EXTREMITY (HCC): Primary | ICD-10-CM

## 2018-01-10 DIAGNOSIS — I70.213 ATHEROSCLEROSIS OF NATIVE ARTERY OF BOTH LOWER EXTREMITIES WITH INTERMITTENT CLAUDICATION (HCC): Chronic | ICD-10-CM

## 2018-01-10 DIAGNOSIS — I74.09 AORTOILIAC OCCLUSIVE DISEASE (HCC): Chronic | ICD-10-CM

## 2018-01-11 NOTE — PROCEDURES
[facility]   *** FINAL REPORT ***    Name: Lay Steele  MRN: [id number]       Outpatient  : 22 Mar 1960  HIS Order #: [id number]  [address] [facility] Visit #: [id number]  Date: 10 [address] 2018    TYPE OF TEST: Aorto-Iliac Duplex    REASON FOR TEST  Peripheral Arterial Disease    B-Mode:-                 (cm)   1     2     3  Aortic diameter:         AP:                           TV:  Common iliac diameter:   Right:                           Left:    Duplex:-                           PSV  Stenosis                           ----- --------------------  Aorta: (1)         (2)         (3)    Right common iliac:      123.0  Right external iliac:    133.0 Normal    Left common iliac:  Left external iliac:    INTERPRETATION/FINDINGS  Duplex images were obtained using 2-D gray scale, color flow and  spectral doppler analysis. RT Ax Fem BP. Patent right axillary to right common femoral artery bypass graft  without significant stenosis. 2. Multiphasic signals noted throughout. 3. Continued evidence of perigraft fluid/collection at the above hip  level measuring approximately 2.4 x 3.0 cm Trv. ADDITIONAL COMMENTS  Inflow: 199 cm/sec   Prx Anast 204 cm/sec  Prx 192 cm/sec      Ax 130  cm/sec       Below Ax 133 cm/sec  Mid Rib 130 cm/sec  Ribline 109 cm/sec            Below Ribline 109 cm/sec  Imbil 112 cm/sec    Hip 99 cm/sec     Below Hip 110 cm/sec              Dst 122 cm/sec    Dist Anast 133 cm/sec    Outflow 67  cm/sec   Above Hip level  approximately 2.4 x 3.0 cm Trv. I have personally reviewed the data relevant to the interpretation of  this  study. TECHNOLOGIST: Louisa Weller RVT, BS  Signed: 2018 08:06 AM    PHYSICIAN: STEW Land   Signed: 2018 05:23 PM

## 2018-01-11 NOTE — PROGRESS NOTES
Patient's daughter called and was concerned about a swollen, raised area to right hip area and so asked that patient be brought in. Upon assessment, there is soft raised area to right hip, over bypass, about the size of a golf ball. No palpable pulse, no redness, no open areas and patient has not been running any fever or having any pain. Due to patient's past, just wanted to make us aware. Had Pelon Polanco assess and decided to have ultrasound completed of the bypass. [facility] reviewed the ultrasound and decided will notify Dr. Wil Rivas and Reyes Fernandez of visit and ultrasound and then call patient with next plan. Patient is to call if there is any change at all to this area.

## 2018-01-16 ENCOUNTER — HOSPITAL ENCOUNTER (OUTPATIENT)
Dept: LAB | Age: 58
Discharge: HOME | End: 2018-01-16
Payer: COMMERCIAL

## 2018-01-16 ENCOUNTER — OFFICE VISIT (OUTPATIENT)
Dept: VASCULAR SURGERY | Age: 58
End: 2018-01-16

## 2018-01-16 VITALS
RESPIRATION RATE: 20 BRPM | HEART RATE: 84 BPM | WEIGHT: 154 LBS | SYSTOLIC BLOOD PRESSURE: 120 MMHG | BODY MASS INDEX: 22.05 KG/M2 | HEIGHT: 70 IN | DIASTOLIC BLOOD PRESSURE: 62 MMHG

## 2018-01-16 DIAGNOSIS — I97.622 POSTPROCEDURAL SEROMA OF A CIRCULATORY SYSTEM ORGAN OR STRUCTURE FOLLOWING OTHER PROCEDURE: Primary | ICD-10-CM

## 2018-01-16 DIAGNOSIS — R74.8 ELEVATED LIVER ENZYMES: Primary | ICD-10-CM

## 2018-01-16 DIAGNOSIS — I97.622 POSTPROCEDURAL SEROMA OF A CIRCULATORY SYSTEM ORGAN OR STRUCTURE FOLLOWING OTHER PROCEDURE: ICD-10-CM

## 2018-01-16 PROCEDURE — 87070 CULTURE OTHR SPECIMN AEROBIC: CPT | Performed by: PODIATRIST

## 2018-01-16 PROCEDURE — 87075 CULTR BACTERIA EXCEPT BLOOD: CPT | Performed by: PODIATRIST

## 2018-01-16 NOTE — MR AVS SNAPSHOT
[address] 132 706 [facility] 
[phone] Patient: Vivian Logan MRN: [id number] NYR:1[birth year]71[birth year]23 Visit Information Date & Time Provider Department Dept. Phone Encounter #  
 1/16/2018  1:15 PM MD Toshia Willamsino [facility] [phone] [id number] Your Appointments 1/18/2018 10:00 AM  
ROUTINE CARE with Marck Roberson MD  
[facility] ([address] [place] [address]) Appt Note: routine f/u 6mo [address] [facility] [address] [place] [address] [address] 706 [facility]  
  
    
 2/5/2018  9:30 AM  
Follow Up with Teodora Sanchez, [address] [facility] ([address] [place] [address]) Appt Note: Follow up no studies [facility] 706 [facility]  
[phone] [address] [place] [address]  
  
    
 3/16/2018  8:00 AM  
PROCEDURE with [facility] 2 [facility] ([address] [place] [address]) Appt Note: ax fem 3mos wild 1212 [facility] 706 [facility]  
[phone] [address] [place] [address]  
  
    
 3/16/2018  9:00 AM  
PROCEDURE with [facility] 2 [facility] ([address] [place] [address]) Appt Note: cv  3mos wild 1212 [facility] Osvaldo Garciaon 615 706 [facility]  
[phone] 3/16/2018 10:00 AM  
PROCEDURE with [facility] IM[facility] 2 [facility] ([address] [place] [address]) Appt Note: fem fem 3mos wild 1212 [facility] 216 706 [facility]  
[phone] 3/16/2018 11:00 AM  
PROCEDURE with [facility] NONIMAGING [facility] ([address] [place] [address]) Appt Note: kita  3mos wild 1212 [facility] 706 [facility]  
[phone] 2300 [facility] Corinne Cortes 315 [facility]  
  
    
 3/29/2018 10:00 AM  
Follow Up with [facility] Appt Note: 3 month follow up after studies with prep 2300 [facility] Corinne Cortes 209 200 [facility]  
[phone] Upcoming Health Maintenance Date Due COLONOSCOPY 7/23/2020 DTaP/Tdap/Td series (2 - Td) 7/5/2026 Allergies as of 1/16/2018  Review Complete On: 1/16/2018 By: Paz Kebede MD  
  
 Severity Noted Reaction Type Reactions Morphine  08/03/2017   Side Effect Other (comments) Patient gets confused with morphine. Current Immunizations  Reviewed on 11/20/2017 Name Date Influenza Vaccine (Quad) PF 11/20/2017 Tdap 7/5/2016 Not reviewed this visit You Were Diagnosed With   
  
 Codes Comments Postprocedural seroma of a circulatory system organ or structure following other procedure    -  Primary ICD-10-CM: K58.140 ICD-9-CM: 998.13 Vitals BP Pulse Resp Height(growth percentile) Weight(growth percentile) BMI  
 120/62 (BP 1 Location: Left arm, BP Patient Position: Sitting) 84 20 5' 10\" (1.778 m) 154 lb (69.9 kg) 22.1 kg/m2 Smoking Status Former Smoker Vitals History BMI and BSA Data Body Mass Index Body Surface Area  
 22.1 kg/m 2 1.86 m 2 Preferred Pharmacy Pharmacy Name Phone [facility] [address], [address] Your Updated Medication List  
  
   
This list is accurate as of: 1/16/18  2:23 PM.  Always use your most recent med list.  
  
  
  
  
 amiodarone 200 mg tablet Commonly known as:  CORDARONE  
take 1 tablet by mouth once daily INDICATION: VENTRICULAR RATE CONTROL IN ATRIAL FIBRILLATION  
  
 aspirin 81 mg chewable tablet Take 1 Tab by mouth daily (with breakfast). atorvastatin 40 mg tablet Commonly known as:  LIPITOR Take 1 Tab by mouth nightly. Indications: DYSLIPIDEMIA  
  
 furosemide 20 mg tablet Commonly known as:  LASIX  
1 tablet daily  Indications: Pulmonary Edema due to Chronic Heart Failure  
  
 gabapentin 300 mg capsule Commonly known as:  NEURONTIN Take 1 Cap by mouth three (3) times daily. Indications: NEUROPATHIC PAIN  
  
 losartan 50 mg tablet Commonly known as:  COZAAR Take 1 Tab by mouth daily. magnesium oxide 400 mg tablet Commonly known as:  MAG-OX Take 1 Tab by mouth daily. metoprolol tartrate 25 mg tablet Commonly known as:  LOPRESSOR Take 1 Tab by mouth every twelve (12) hours. Indications: hypertension, VENTRICULAR RATE CONTROL IN ATRIAL FIBRILLATION  
  
 multivitamin tablet Commonly known as:  ONE A DAY Take 1 Tab by mouth daily. PLAVIX 75 mg Tab Generic drug:  clopidogrel Take 75 mg by mouth daily. potassium chloride 20 mEq packet Commonly known as:  KLOR-CON Take 1 Packet by mouth daily. [while on Furosemide]  Indications: hypokalemia prevention VITAMIN D3 1,000 unit Cap Generic drug:  cholecalciferol Take  by mouth daily. zinc sulfate 220 (50) mg capsule Commonly known as:  ZINCATE  
take 1 capsule by mouth once daily We Performed the Following PUNCTURE DRAINAGE OF LESION [73648 CPT(R)] Please provide this summary of care documentation to your next provider. Your primary care clinician is listed as Yasmeen Bryd. If you have any questions after today's visit, please call [phone].

## 2018-01-16 NOTE — PROGRESS NOTES
Mr. Trujillo Living had his progressive fluctuant mass over his right flank. There is no pain no fever no redness. I was able to palpate this easily talked about aspirating it which she agrees I did an alcohol prep localized with 1% plain lidocaine and an 18-gauge needle aspiration. There was a clear fluid 14 cc that came out. No cellular appearance no fluctuance no blood. Did send a specimen for culture. The discomfort went away right away plated Band-Aid him to ice pack tonight warm compress tomorrow will follow along to see if this re-accumulates.

## 2018-01-19 DIAGNOSIS — I11.0 BENIGN HYPERTENSIVE HEART DISEASE WITH SYSTOLIC CONGESTIVE HEART FAILURE, NYHA CLASS 2 (HCC): Chronic | ICD-10-CM

## 2018-01-19 DIAGNOSIS — I50.20 BENIGN HYPERTENSIVE HEART DISEASE WITH SYSTOLIC CONGESTIVE HEART FAILURE, NYHA CLASS 2 (HCC): Chronic | ICD-10-CM

## 2018-01-19 DIAGNOSIS — G60.9 HEREDITARY PERIPHERAL NEUROPATHY: Chronic | ICD-10-CM

## 2018-01-19 DIAGNOSIS — I50.22 CHRONIC SYSTOLIC HEART FAILURE (HCC): Chronic | ICD-10-CM

## 2018-01-19 RX ORDER — GABAPENTIN 300 MG/1
CAPSULE ORAL
Qty: 84 CAP | Refills: 3 | Status: SHIPPED | OUTPATIENT
Start: 2018-01-19 | End: 2018-02-19 | Stop reason: DRUGHIGH

## 2018-01-19 RX ORDER — FUROSEMIDE 20 MG/1
TABLET ORAL
Qty: 90 TAB | Refills: 0 | Status: SHIPPED | OUTPATIENT
Start: 2018-01-19 | End: 2018-09-24 | Stop reason: SDUPTHER

## 2018-01-19 NOTE — TELEPHONE ENCOUNTER
This patient contacted office for the following prescriptions to be filled:    Medication requested :   Requested Prescriptions     Pending Prescriptions Disp Refills    furosemide (LASIX) 20 mg tablet 90 Tab 3     Si tablet daily  Indications: Pulmonary Edema due to Chronic Heart Failure         PCP: sushila    [facility] Print[facility]    Mail order or Local pharmacy [phone]    Scheduled appointment if not seen by current providers in office: lov 17 ucv none

## 2018-01-21 LAB
BACTERIA SPEC CULT: NORMAL
BACTERIA SPEC CULT: NORMAL
GRAM STN SPEC: NORMAL
GRAM STN SPEC: NORMAL
SERVICE CMNT-IMP: NORMAL
SERVICE CMNT-IMP: NORMAL

## 2018-02-05 ENCOUNTER — OFFICE VISIT (OUTPATIENT)
Dept: VASCULAR SURGERY | Age: 58
End: 2018-02-05

## 2018-02-05 VITALS
DIASTOLIC BLOOD PRESSURE: 90 MMHG | SYSTOLIC BLOOD PRESSURE: 150 MMHG | HEART RATE: 70 BPM | BODY MASS INDEX: 22.05 KG/M2 | HEIGHT: 70 IN | WEIGHT: 154 LBS

## 2018-02-05 DIAGNOSIS — S30.1XXD ABDOMINAL WALL SEROMA, SUBSEQUENT ENCOUNTER: Primary | ICD-10-CM

## 2018-02-05 DIAGNOSIS — I70.213 ATHEROSCLEROSIS OF NATIVE ARTERY OF BOTH LOWER EXTREMITIES WITH INTERMITTENT CLAUDICATION (HCC): Chronic | ICD-10-CM

## 2018-02-05 DIAGNOSIS — Z89.612 STATUS POST ABOVE KNEE AMPUTATION OF LEFT LOWER EXTREMITY: ICD-10-CM

## 2018-02-05 DIAGNOSIS — I74.09 AORTOILIAC OCCLUSIVE DISEASE (HCC): Chronic | ICD-10-CM

## 2018-02-05 NOTE — PROGRESS NOTES
Mr. Myke Pro presents today for follow up. He has had progressive fluctuant mass over his right flank. At his last visit mass was aspirated and ~14cc of clear fluid removed. Patient states that the very next day mass reappeared just as before. He denies pain. No fever/chills. No redness. Mass is unchanged in size as before. Cultures were all negative. Discussed that we will continue to monitor the area. I discussed that if we aspirate the seroma again it will likely come back just as before. Would like to try to avoid any risk of complication or infection if possible. Discussed that if the area becomes bothersome to him or continues to grow to call the office and we can aspirate if necessary. Otherwise he has a follow up appt in one month with studies. Will reassess at that point. He does also have some mild edema in the right LE. Foot is warm. Biphasic doppler signals in both DP and PT. Tubigrip placed for gentle compression and patient states that he has compression stocking at home. Discussed role of elevation and compression for swelling. He is s/p left AKA. He is ambulating with his prosthetic limb with a walker and is going to PT after he leaves here. Patient agrees to plan. Patient remains on ASA/Plavix for antiplatelet therapy.

## 2018-02-19 ENCOUNTER — OFFICE VISIT (OUTPATIENT)
Dept: FAMILY MEDICINE CLINIC | Age: 58
End: 2018-02-19

## 2018-02-19 VITALS
TEMPERATURE: 97.7 F | BODY MASS INDEX: 26.34 KG/M2 | WEIGHT: 184 LBS | HEIGHT: 70 IN | SYSTOLIC BLOOD PRESSURE: 116 MMHG | OXYGEN SATURATION: 98 % | HEART RATE: 78 BPM | DIASTOLIC BLOOD PRESSURE: 76 MMHG | RESPIRATION RATE: 16 BRPM

## 2018-02-19 DIAGNOSIS — I70.213 ATHEROSCLEROSIS OF NATIVE ARTERY OF BOTH LOWER EXTREMITIES WITH INTERMITTENT CLAUDICATION (HCC): Primary | Chronic | ICD-10-CM

## 2018-02-19 DIAGNOSIS — I25.10 CORONARY ARTERY DISEASE INVOLVING NATIVE CORONARY ARTERY OF NATIVE HEART WITHOUT ANGINA PECTORIS: Chronic | ICD-10-CM

## 2018-02-19 DIAGNOSIS — Z89.612 STATUS POST ABOVE KNEE AMPUTATION OF LEFT LOWER EXTREMITY: ICD-10-CM

## 2018-02-19 DIAGNOSIS — D64.9 CHRONIC ANEMIA: Chronic | ICD-10-CM

## 2018-02-19 DIAGNOSIS — E78.5 DYSLIPIDEMIA: Chronic | ICD-10-CM

## 2018-02-19 DIAGNOSIS — Z86.39 HISTORY OF VITAMIN D DEFICIENCY: ICD-10-CM

## 2018-02-19 DIAGNOSIS — I48.0 PAROXYSMAL ATRIAL FIBRILLATION (HCC): ICD-10-CM

## 2018-02-19 RX ORDER — GABAPENTIN 400 MG/1
400 CAPSULE ORAL 3 TIMES DAILY
Qty: 90 CAP | Refills: 0 | Status: SHIPPED | OUTPATIENT
Start: 2018-02-19 | End: 2018-03-27 | Stop reason: SDUPTHER

## 2018-02-19 NOTE — PATIENT INSTRUCTIONS

## 2018-02-19 NOTE — MR AVS SNAPSHOT
1017 [facility] [address] 706 [facility] 
[phone] Patient: Chris Rust MRN: [id number] TPM:2/32/0736 Visit Information Date & Time Provider Department Dept. Phone Encounter #  
 2/19/2018 11:00 AM Amalia Vega, [address] [place] [address] [id number] Follow-up Instructions Return in about 4 months (around 6/19/2018), or if symptoms worsen or fail to improve. Your Appointments 3/16/2018  8:00 AM  
PROCEDURE with [facility] IMAG[facility] 2 [facility] ([address] [place] [address]) Appt Note: ax fem 3mos wild 2300 [facility] 547 706 [facility]  
[phone] [address] [place], [place] [address]  
  
    
 3/16/2018  9:00 AM  
PROCEDURE with [facility] IMAGING 2 [facility] ([address] [place] [address]) Appt Note: cv  3mos wild [address] [place] Neetu 866 706 [facility]  
[phone] 3/16/2018 10:00 AM  
PROCEDURE with [facility] IMAGING 2 [facility] ([address] [place] [address] Road) Appt Note: fem fem 3mos wild [address] [place] Neetu 122 706 [facility]  
[phone] 3/16/2018 11:00 AM  
PROCEDURE with [facility] NONIMAGING [facility] ([address] [place] [address] Road) Appt Note: kita  3mos wild [address] [place] Neetu 786 706 [facility]  
[phone] [address] [place] 47 [facility]  
  
    
 3/29/2018 10:00 AM  
Follow Up with Patrick Pelon Johnson [facility] ([address] [place] [address] Road) Appt Note: 3 month follow up after studies with prep [address] [place] Gladden 253 706 [facility]  
[phone] 2300 [facility] 706 [facility] Upcoming Health Maintenance Date Due ZOSTER VACCINE AGE 60> 1/22/2018 COLONOSCOPY 7/23/2020 DTaP/Tdap/Td series (2 - Td) 2026 Allergies as of 2018  Review Complete On: 2018 By: Sanjay Castle LPN Severity Noted Reaction Type Reactions Morphine  2017   Side Effect Other (comments) Patient gets confused with morphine. Current Immunizations  Reviewed on 2017 Name Date Influenza Vaccine (Quad) PF 2017 Tdap 2016 Not reviewed this visit You Were Diagnosed With   
  
 Codes Comments Atherosclerosis of native artery of both lower extremities with intermittent claudication ([address]    -  Primary ICD-10-CM: W75.892 ICD-9-CM: 440.21 Status post above knee amputation of left lower extremity ([address]     ICD-10-CM: T63.755 ICD-9-CM: V49.76 Paroxysmal atrial fibrillation (HCC)     ICD-10-CM: I48.0 ICD-9-CM: 427.31 Chronic anemia     ICD-10-CM: D64.9 ICD-9-CM: 159. 9 Dyslipidemia     ICD-10-CM: E78.5 ICD-9-CM: 272.4 Coronary artery disease involving native coronary artery of native heart without angina pectoris     ICD-10-CM: I25.10 ICD-9-CM: 414.01 History of vitamin D deficiency     ICD-10-CM: Z86.39 
ICD-9-CM: V12.1 Vitals BP Pulse Temp Resp Height(growth percentile) Weight(growth percentile) 116/76 (BP 1 Location: Left arm, BP Patient Position: Sitting) 78 97.7 °F (36.5 °C) (Oral) 16 5' 10\" (1.778 m) 184 lb (83.5 kg) SpO2 BMI Smoking Status 98% 26.4 kg/m2 Former Smoker BMI and BSA Data Body Mass Index Body Surface Area  
 26.4 kg/m 2 2.03 m 2 Preferred Pharmacy Pharmacy Name Phone [facility], [address] Your Updated Medication List  
  
   
This list is accurate as of: 18 11:23 AM.  Always use your most recent med list.  
  
  
  
  
 amiodarone 200 mg tablet Commonly known as:  CORDARONE  
take 1 tablet by mouth once daily INDICATION: VENTRICULAR RATE CONTROL IN ATRIAL FIBRILLATION  
  
 aspirin 81 mg chewable tablet Take 1 Tab by mouth daily (with breakfast). atorvastatin 40 mg tablet Commonly known as:  LIPITOR Take 1 Tab by mouth nightly. Indications: DYSLIPIDEMIA  
  
 furosemide 20 mg tablet Commonly known as:  LASIX  
1 tablet daily  Indications: Pulmonary Edema due to Chronic Heart Failure  
  
 gabapentin 300 mg capsule Commonly known as:  NEURONTIN  
take 1 capsule by mouth three times a day for NEUROPATHIC PAIN  
  
 losartan 50 mg tablet Commonly known as:  COZAAR Take 1 Tab by mouth daily. magnesium oxide 400 mg tablet Commonly known as:  MAG-OX Take 1 Tab by mouth daily. metoprolol tartrate 25 mg tablet Commonly known as:  LOPRESSOR Take 1 Tab by mouth every twelve (12) hours. Indications: hypertension, VENTRICULAR RATE CONTROL IN ATRIAL FIBRILLATION  
  
 multivitamin tablet Commonly known as:  ONE A DAY Take 1 Tab by mouth daily. PLAVIX 75 mg Tab Generic drug:  clopidogrel Take 75 mg by mouth daily. potassium chloride 20 mEq packet Commonly known as:  KLOR-CON Take 1 Packet by mouth daily. [while on Furosemide]  Indications: hypokalemia prevention VITAMIN D3 1,000 unit Cap Generic drug:  cholecalciferol Take  by mouth daily. zinc sulfate 220 (50) mg capsule Commonly known as:  ZINCATE  
take 1 capsule by mouth once daily Follow-up Instructions Return in about 4 months (around 6/19/2018), or if symptoms worsen or fail to improve. To-Do List   
 02/19/2018 Lab:  CBC WITH AUTOMATED DIFF   
  
 02/19/2018 Lab:  LIPID PANEL   
  
 02/19/2018 Lab:  MAGNESIUM   
  
 02/19/2018 Lab:  METABOLIC PANEL, COMPREHENSIVE   
  
 02/19/2018 Lab:  VITAMIN D, 25 HYDROXY Patient Instructions Heart-Healthy Diet: Care Instructions Your Care Instructions A heart-healthy diet has lots of vegetables, fruits, nuts, beans, and whole grains, and is low in salt. It limits foods that are high in saturated fat, such as meats, cheeses, and fried foods. It may be hard to change your diet, but even small changes can lower your risk of heart attack and heart disease. Follow-up care is a key part of your treatment and safety. Be sure to make and go to all appointments, and call your doctor if you are having problems. It's also a good idea to know your test results and keep a list of the medicines you take. How can you care for yourself at home? Watch your portions · Learn what a serving is. A \"serving\" and a \"portion\" are not always the same thing. Make sure that you are not eating larger portions than are recommended. For example, a serving of pasta is ½ cup. A serving size of meat is 2 to 3 ounces. A 3-ounce serving is about the size of a deck of cards. Measure serving sizes until you are good at [place]" them. Keep in mind that restaurants often serve portions that are 2 or 3 times the size of one serving. · To keep your energy level up and keep you from feeling hungry, eat often but in smaller portions. · Eat only the number of calories you need to stay at a healthy weight. If you need to lose weight, eat fewer calories than your body burns (through exercise and other physical activity). Eat more fruits and vegetables · Eat a variety of fruit and vegetables every day. Dark green, deep orange, red, or yellow fruits and vegetables are especially good for you. Examples include spinach, carrots, peaches, and berries. · Keep carrots, celery, and other veggies handy for snacks. Buy fruit that is in season and store it where you can see it so that you will be tempted to eat it. · Cook dishes that have a lot of veggies in them, such as stir-fries and soups. Limit saturated and trans fat · Read food labels, and try to avoid saturated and trans fats. They increase your risk of heart disease.  Trans fat is found in many processed foods such as cookies and crackers. · Use olive or canola oil when you cook. Try cholesterol-lowering spreads, such as Benecol or Take Control. · Bake, broil, grill, or steam foods instead of frying them. · Choose lean meats instead of high-fat meats such as hot dogs and sausages. Cut off all visible fat when you prepare meat. · Eat fish, skinless poultry, and meat alternatives such as soy products instead of high-fat meats. Soy products, such as tofu, may be especially good for your heart. · Choose low-fat or fat-free milk and dairy products. Eat fish · Eat at least two servings of fish a week. Certain fish, such as salmon and tuna, contain omega-3 fatty acids, which may help reduce your risk of heart attack. Eat foods high in fiber · Eat a variety of grain products every day. Include whole-grain foods that have lots of fiber and nutrients. Examples of whole-grain foods include oats, whole wheat bread, and brown rice. · Buy whole-grain breads and cereals, instead of white bread or pastries. Limit salt and sodium · Limit how much salt and sodium you eat to help lower your blood pressure. · Taste food before you salt it. Add only a little salt when you think you need it. With time, your taste buds will adjust to less salt. · Eat fewer snack items, fast foods, and other high-salt, processed foods. Check food labels for the amount of sodium in packaged foods. · Choose low-sodium versions of canned goods (such as soups, vegetables, and beans). Limit sugar · Limit drinks and foods with added sugar. These include candy, desserts, and soda pop. Limit alcohol · Limit alcohol to no more than 2 drinks a day for men and 1 drink a day for women. Too much alcohol can cause health problems. When should you call for help? Watch closely for changes in your health, and be sure to contact your doctor if: 
? · You would like help planning heart-healthy meals. Where can you learn more? Go to http://yuniel-lyndsay.info/. Enter V137 in the search box to learn more about \"Heart-Healthy Diet: Care Instructions. \" Current as of: September 21, 2016 Content Version: 11.4 © 4515-1766 [employer]. Care instructions adapted under license by [employer] (which disclaims liability or warranty for this information). If you have questions about a medical condition or this instruction, always ask your healthcare professional. [address] any warranty or liability for your use of this information. Please provide this summary of care documentation to your next provider. Your primary care clinician is listed as Christin Boston. If you have any questions after today's visit, please call [phone].

## 2018-02-19 NOTE — PROGRESS NOTES
Chief Complaint   Patient presents with    Hypertension    Other     PAD    CHF    Coronary Artery Disease    Cholesterol Problem     1. Have you been to the ER, urgent care clinic since your last visit? Hospitalized since your last visit? No    2. Have you seen or consulted any other health care providers outside of the [address] [place] [address] since your last visit? Include any pap smears or colon screening.  Yes When: 1st week in Jan Where: Dr. Valentine Kirby Reason for visit: podiatry follow-up

## 2018-02-19 NOTE — PROGRESS NOTES
Chris Rust, 59 y.o.,  male    SUBJECTIVE  Ff-up     PAD bilateral- s/p AKA Left leg and complicated revasculariation. Walking on his prosthetic leg now for 5 weeks. Vascular notes- abdominal seroma neg cx, monitoring. C/o pain on right leg, only slight improvement on current dose of neurontin. Continues care with podiatry for R leg ulcers, off loading. Lives with daughter who is caring for him full time. H/o CAD s/p angioplasty and Afib. Rectal bleeding on coumadin which was discontinued. Denies cp, syncope, palpitations, on amiodarone asa/plavix. Following dr. Miller July. He is hypertensive,. H/o blood loss anemia, on po fe.      ROS:  See HPI, all others negative        Patient Active Problem List   Diagnosis Code    Benign hypertensive heart disease with systolic congestive heart failure, NYHA class 2 ([facility]) I11.0, I50.20    DDD (degenerative disc disease), lumbar M51.36    Erectile dysfunction N52.9    Spinal stenosis of lumbar region with radiculopathy M48.061, M54.16    Hereditary peripheral neuropathy G60.9    Aortoiliac occlusive disease ([address] I74.09    Atherosclerosis of native artery of both lower extremities with intermittent claudication ([address] I70.213    Peripheral artery disease ([address] I73.9    Coronary artery disease involving native coronary artery of native heart I25.10    Paroxysmal atrial fibrillation (HCC) I48.0    Acute blood loss as cause of postoperative anemia D62    Impaired mobility and ADLs Z74.09    Ischemic cardiomyopathy I25.5    Chronic systolic heart failure (HCC) I50.22    Carotid artery disease ([facility]) I77.9    Dyslipidemia E78.5    Euthyroid sick syndrome E07.81    Aftercare following surgery of the circulatory system Z48.812    Status post femoral-popliteal bypass surgery Z95.828    History of cardioversion Z98.890    Critical ischemia of lower extremity I99.8    History of vitamin D deficiency Z86.39    Pseudoaneurysm of femoral artery ([address] I72.4    Infection of vascular bypass graft ([facility] T82. 7XXA    Chronic anemia D64.9    Chronic ulcer of right heel ([facility]) L97.419    Ischemic rest pain of lower extremity ([facility]) I73.9    Prolonged Q-T interval on ECG R94.31    Status post above knee amputation of left lower extremity ([facility] P73.753    Aftercare for amputation stump Z47.81    Moderate to severe pulmonary hypertension I27.20    Adverse effect of amiodarone T46.2X5A    Skin ulcer of malleolar area of right ankle ([facility]) L97.319       Current Outpatient Prescriptions   Medication Sig Dispense Refill    gabapentin (NEURONTIN) 400 mg capsule Take 1 Cap by mouth three (3) times daily. 90 Cap 0    furosemide (LASIX) 20 mg tablet 1 tablet daily  Indications: Pulmonary Edema due to Chronic Heart Failure 90 Tab 0    zinc sulfate (ZINCATE) 220 (50) mg capsule take 1 capsule by mouth once daily 90 Cap 0    cholecalciferol (VITAMIN D3) 1,000 unit cap Take  by mouth daily.  metoprolol tartrate (LOPRESSOR) 25 mg tablet Take 1 Tab by mouth every twelve (12) hours. Indications: hypertension, VENTRICULAR RATE CONTROL IN ATRIAL FIBRILLATION 180 Tab 3    magnesium oxide (MAG-OX) 400 mg tablet Take 1 Tab by mouth daily. 90 Tab 3    losartan (COZAAR) 50 mg tablet Take 1 Tab by mouth daily. 90 Tab 2    amiodarone (CORDARONE) 200 mg tablet take 1 tablet by mouth once daily INDICATION: VENTRICULAR RATE CONTROL IN ATRIAL FIBRILLATION 90 Tab 3    multivitamin (ONE A DAY) tablet Take 1 Tab by mouth daily.  clopidogrel (PLAVIX) 75 mg tab Take 75 mg by mouth daily.  aspirin 81 mg chewable tablet Take 1 Tab by mouth daily (with breakfast). 90 Tab 3    atorvastatin (LIPITOR) 40 mg tablet Take 1 Tab by mouth nightly. Indications: DYSLIPIDEMIA 90 Tab 3    potassium chloride (KLOR-CON) 20 mEq packet Take 1 Packet by mouth daily.  [while on Furosemide]  Indications: hypokalemia prevention 30 Packet 0       Allergies   Allergen Reactions    Morphine Other (comments)     Patient gets confused with morphine. Past Medical History:   Diagnosis Date    Acute blood loss as cause of postoperative anemia 4/4/2017    Anticoagulated on Coumadin     Aortoiliac occlusive disease (HCC) 1/25/2017    Atherosclerosis of native artery of both lower extremities with intermittent claudication ([address] 1/25/2017    Benign hypertensive heart disease with systolic congestive heart failure, NYHA class 2 ([address] 1/26/2015    Carotid artery disease (HCC)     Chronic anemia     Chronic systolic heart failure (HCC)     Chronic ulcer of right foot ([address] 4/4/2017    Chronic ulcer of right heel ([address] 8/8/2017    Coronary artery disease involving native coronary artery of native heart 3/15/2017    Successful stenting of Cx (Xience LESLEY) and RCA (Xience LESLEY) to 0% by Dr. Ankita Escobar on 3/15/17.  DDD (degenerative disc disease), lumbar 1/26/2015    Dyslipidemia     Erectile dysfunction 7/5/2016    Euthyroid sick syndrome 4/6/2017    Hereditary peripheral neuropathy 11/15/2016    History of cardioversion 4/18/2017    S/P Synchronized external cardioversion (4/18/2017 - Dr. Ash Padilla)    History of vitamin D deficiency 4/22/2017    Vitamin D 25-Hydroxy (4/22/2017) = 12.0; Vitamin D 25-Hydroxy (5/26/2017) = 38.7    Infection of vascular bypass graft ([address] 7/24/2017    Left lower extremity    Ischemic cardiomyopathy     Moderate to severe pulmonary hypertension 7/23/2017    Paroxysmal atrial fibrillation (HCC)     Peripheral artery disease ([address] 1/25/2017    Skin ulcer of malleolar area of right ankle ([address]     Spinal stenosis of lumbar region with radiculopathy 5/4/2015    Dr. Mosqueda January       Social History     Social History    Marital status: LEGALLY      Spouse name: N/A    Number of children: N/A    Years of education: N/A     Occupational History    Not on file.      Social History Main Topics    Smoking status: Former Smoker    Smokeless tobacco: Never Used      Comment: quit in 2011    Alcohol use 1.2 oz/week     2 Cans of beer per week      Comment: 10 cans of beer a month    Drug use: Yes     Special: Marijuana      Comment: occasionally-last used 4/17    Sexual activity: Yes     Partners: Female     Other Topics Concern    Not on file     Social History Narrative       Family History   Problem Relation Age of Onset    Heart Disease Father          OBJECTIVE    Physical Exam:     Visit Vitals    /76 (BP 1 Location: Left arm, BP Patient Position: Sitting)    Pulse 78    Temp 97.7 °F (36.5 °C) (Oral)    Resp 16    Ht 5' 10\" (1.778 m)    Wt 184 lb (83.5 kg)    SpO2 98%    BMI 26.4 kg/m2       General: alert, in no apparent distress or pain  Neck: supple, no adenopathy palpated  CVS: normal rate, regular rhythm, distinct S1 and S2  Lungs:clear to ausculation bilaterally, no crackles, wheezing or rhonchi noted  Extremities: L AKA  W/ prosthesis  Skin: warm, no lesions, rashes noted  Psych:  mood and affect normal        ASSESSMENT/PLAN  Diagnoses and all orders for this visit:    1. PAD (peripheral artery disease) (HCC)  S/p L AKA, and complicated revascularization  plavix, asa, statin  Following Vasc surgery closely, and podiatry for R foot ulcers  Trial increase in gabapentin dose to 400 mg tid. 2. Benign hypertensive heart disease with systolic congestive heart failure, NYHA class 2 (HCC)  Appears compensated  Cont lasix, on BB and ARB  -     furosemide (LASIX) 20 mg tablet; 1 tablet daily  Indications: Pulmonary Edema due to Chronic Heart Failure    3. Chronic systolic heart failure (HCC)  -     furosemide (LASIX) 20 mg tablet; 1 tablet daily  Indications: Pulmonary Edema due to Chronic Heart Failure    4. Coronary artery disease involving native coronary artery of native heart without angina pectoris  S/p stent  On ASA, plavix, BB, ARB, statin  Cont care with dr. Vika Villar    5.  Dyslipidemia  LDL goal is <70,   On lipitor  Check CMP/Lipid panel/Vit D/magnesium prior to next visit, and see if we can wean off some of MVT meds. 6. Status post above knee amputation of left lower extremity (HCC)    7. Paroxysmal atrial fibrillation (HCC)  Rate controlled, anticoag is contraindicated due to bleeding  On amiodarone and BB    History of Vitamin D deficiency  Recheck    History of anemia  Recheck CBC      BMI 27  Has regained weight, commended on healthy food choices, monitoring    Other orders  -     metoprolol tartrate (LOPRESSOR) 25 mg tablet; Take 1 Tab by mouth every twelve (12) hours. Indications: hypertension, VENTRICULAR RATE CONTROL IN ATRIAL FIBRILLATION  -     magnesium oxide (MAG-OX) 400 mg tablet; Take 1 Tab by mouth daily. -     losartan (COZAAR) 50 mg tablet; Take 1 Tab by mouth daily. Follow-up Disposition:  Return in about 4 months (around 6/19/2018), or if symptoms worsen or fail to improve. Patient understands plan of care. Patient has provided input and agrees with goals.

## 2018-03-05 RX ORDER — ZINC SULFATE 50(220)MG
CAPSULE ORAL
Qty: 100 CAP | Refills: 0 | Status: SHIPPED | OUTPATIENT
Start: 2018-03-05 | End: 2018-09-09 | Stop reason: SDUPTHER

## 2018-03-06 ENCOUNTER — OFFICE VISIT (OUTPATIENT)
Dept: CARDIOLOGY CLINIC | Age: 58
End: 2018-03-06

## 2018-03-06 VITALS
HEIGHT: 70 IN | SYSTOLIC BLOOD PRESSURE: 138 MMHG | BODY MASS INDEX: 26.2 KG/M2 | DIASTOLIC BLOOD PRESSURE: 76 MMHG | OXYGEN SATURATION: 98 % | HEART RATE: 63 BPM | WEIGHT: 183 LBS

## 2018-03-06 DIAGNOSIS — I48.0 PAROXYSMAL ATRIAL FIBRILLATION (HCC): ICD-10-CM

## 2018-03-06 DIAGNOSIS — I25.10 CORONARY ARTERY DISEASE INVOLVING NATIVE CORONARY ARTERY OF NATIVE HEART WITHOUT ANGINA PECTORIS: Primary | Chronic | ICD-10-CM

## 2018-03-06 DIAGNOSIS — I50.22 CHRONIC SYSTOLIC HEART FAILURE (HCC): Chronic | ICD-10-CM

## 2018-03-06 DIAGNOSIS — E78.5 DYSLIPIDEMIA: Chronic | ICD-10-CM

## 2018-03-06 DIAGNOSIS — I25.5 ISCHEMIC CARDIOMYOPATHY: Chronic | ICD-10-CM

## 2018-03-06 RX ORDER — ATORVASTATIN CALCIUM 80 MG/1
80 TABLET, FILM COATED ORAL DAILY
COMMUNITY
End: 2018-03-06 | Stop reason: SDUPTHER

## 2018-03-06 NOTE — PROGRESS NOTES
Review of Systems   Constitutional: Negative for chills, fever, malaise/fatigue and weight loss. Respiratory: Negative for cough, hemoptysis, shortness of breath and wheezing. Cardiovascular: Negative for chest pain, palpitations, orthopnea and leg swelling. Gastrointestinal: Positive for melena. Negative for abdominal pain, blood in stool, constipation, diarrhea, heartburn, nausea and vomiting. Musculoskeletal: Negative for falls, joint pain and myalgias. Neurological: Negative for dizziness.

## 2018-03-06 NOTE — PROGRESS NOTES
1. Have you been to the ER, urgent care clinic since your last visit? Hospitalized since your last visit?no    2. Have you seen or consulted any other health care providers outside of the [address] [place] [address] since your last visit? Include any pap smears or colon screening.  no

## 2018-03-06 NOTE — MR AVS SNAPSHOT
[address] [place] [address] [address] [facility] [place] [address] 
[phone] Patient: Low Betancourt MRN: [id number] FND:[birth year] Visit Information Date & Time Provider Department Dept. Phone Encounter #  
 3/6/2018  9:20 AM Ifeanyi Salazar DO [facility] [id number] Follow-up Instructions Return in about 6 months (around 9/6/2018), or if symptoms worsen or fail to improve. Your Appointments 3/16/2018  8:00 AM  
PROCEDURE with [facility] 2 [facility] ([facility]) Appt Note: ax fem 3mos wild [address] [facility]  
[phone] [address] [place] [address]  
  
    
 3/16/2018  9:00 AM  
PROCEDURE with [facility] IMAG[facility] 2 [facility] ([facility]) Appt Note: cv  3mos wild [address] [facility]  
[phone] 3/16/2018 10:00 AM  
PROCEDURE with [facility] IMAG[facility] 2 [facility] ([facility]) Appt Note: fem fem 3mos wild [address] [facility]  
[phone] 3/16/2018 11:00 AM  
PROCEDURE with [facility] NONIMAGING [facility] ([facility]) Appt Note: kita  3mos wild [address] [facility]  
[phone] [address] [facility]  
  
    
 3/29/2018 10:00 AM  
Follow Up with [address] [place], [place] [facility] ([facility]) Appt Note: 3 month follow up after studies with prep [address] [facility]  
[phone] [address] [place], [place] [address] [address]  
  
    
 6/19/2018  9:15 AM  
FOLLOW UP EXAM with Nivia Lackey MD  
[facility] ([facility]) Appt Note: 4 mth, f/u  
 [address] [facility] [address] [facility] [address]  
[address] [facility] [address] [facility] [address] Upcoming Health Maintenance Date Due ZOSTER VACCINE AGE 60> 1/22/2018 COLONOSCOPY 7/23/2020 DTaP/Tdap/Td series (2 - Td) 7/5/2026 Allergies as of 3/6/2018  Review Complete On: 3/6/2018 By: Pema Tyson DO Severity Noted Reaction Type Reactions Morphine  08/03/2017   Side Effect Other (comments) Patient gets confused with morphine. Current Immunizations  Reviewed on 11/20/2017 Name Date Influenza Vaccine (Quad) PF 11/20/2017 Tdap 7/5/2016 Not reviewed this visit You Were Diagnosed With   
  
 Codes Comments Coronary artery disease involving native coronary artery of native heart without angina pectoris    -  Primary ICD-10-CM: I25.10 ICD-9-CM: 414.01 Ischemic cardiomyopathy     ICD-10-CM: I25.5 ICD-9-CM: 414.8 Chronic systolic heart failure (HCC)     ICD-10-CM: I50.22 ICD-9-CM: 428.22 Paroxysmal atrial fibrillation (HCC)     ICD-10-CM: I48.0 ICD-9-CM: 427.31 Dyslipidemia     ICD-10-CM: E78.5 ICD-9-CM: 272.4 Vitals BP Pulse Height(growth percentile) Weight(growth percentile) SpO2 BMI  
 138/76 63 5' 10\" (1.778 m) 183 lb (83 kg) 98% 26.26 kg/m2 Smoking Status Former Smoker Vitals History BMI and BSA Data Body Mass Index Body Surface Area  
 26.26 kg/m 2 2.02 m 2 Preferred Pharmacy Pharmacy Name Phone [facility] [address] [id number] [facility] Your Updated Medication List  
  
   
This list is accurate as of 3/6/18 10:21 AM.  Always use your most recent med list.  
  
  
  
  
 amiodarone 200 mg tablet Commonly known as:  CORDARONE  
take 1 tablet by mouth once daily INDICATION: VENTRICULAR RATE CONTROL IN ATRIAL FIBRILLATION  
  
 aspirin 81 mg chewable tablet Take 1 Tab by mouth daily (with breakfast). furosemide 20 mg tablet Commonly known as:  LASIX  
1 tablet daily  Indications: Pulmonary Edema due to Chronic Heart Failure  
  
 gabapentin 400 mg capsule Commonly known as:  NEURONTIN Take 1 Cap by mouth three (3) times daily. LIPITOR 80 mg tablet Generic drug:  atorvastatin Take 80 mg by mouth daily. losartan 50 mg tablet Commonly known as:  COZAAR Take 1 Tab by mouth daily. magnesium oxide 400 mg tablet Commonly known as:  MAG-OX Take 1 Tab by mouth daily. metoprolol tartrate 25 mg tablet Commonly known as:  LOPRESSOR Take 1 Tab by mouth every twelve (12) hours. Indications: hypertension, VENTRICULAR RATE CONTROL IN ATRIAL FIBRILLATION  
  
 multivitamin tablet Commonly known as:  ONE A DAY Take 1 Tab by mouth daily. PLAVIX 75 mg Tab Generic drug:  clopidogrel Take 75 mg by mouth daily. potassium chloride 20 mEq packet Commonly known as:  KLOR-CON Take 1 Packet by mouth daily. [while on Furosemide]  Indications: hypokalemia prevention VITAMIN D3 1,000 unit Cap Generic drug:  cholecalciferol Take  by mouth daily. zinc sulfate 220 (50) mg capsule Commonly known as:  ZINCATE  
take 1 tablet by mouth once daily We Performed the Following AMB POC EKG ROUTINE W/ 12 LEADS, INTER & REP [47247 CPT(R)] Follow-up Instructions Return in about 6 months (around 9/6/2018), or if symptoms worsen or fail to improve. To-Do List   
 05/07/2018 Lab:  LIPID PANEL Patient Instructions Increase lipitor to 80mg daily Please provide this summary of care documentation to your next provider. Your primary care clinician is listed as Alyson Cherry. If you have any questions after today's visit, please call [phone].

## 2018-03-06 NOTE — PROGRESS NOTES
HPI: I saw Carlos Eduardo Cano in my office today in cardiovascular evaluation regarding his problems with a dilated cardiomyopathy and paroxysmal atrial fibrillation. Mr. Charles Has is a pleasant 49-year-old  male who has been previously followed by my associate Dr. Sterling Irizarry for a nonischemic cardiomyopathy. Historically, he has had multiple issues including dyslipidemia peripheral vascular disease status post a left AKA amputation, paroxysmal atrial fibrillation for which she has been on Coumadin in the past and underwent a cardioversion back in April 2017, and coronary artery disease with a cardiac catheterization back on March 8, 2017 demonstrating mild disease except for proximal 50% obstruction and healed 90% trifurcation lesion in the mid circumflex as well as a 50% diffuse proximal and 90% mid right coronary artery obstruction with successful stenting of the circumflex and the RCA on March 15, 2017 using Xience drug-eluting stents. He comes in today with his daughter and relates that he is doing fairly well. He is staying fairly active and denies any cardiovascular symptomatology at this time. It should be noted that his most recent echocardiogram which was completed on October 20, 2017 demonstrated normal overall left ventricular function with ejection fraction of 55% and some mild basal inferior wall hypokinesia. Encounter Diagnoses   Name Primary?  Coronary artery disease involving native coronary artery of native heart without angina pectoris Yes    Ischemic cardiomyopathy     Chronic systolic heart failure (HCC)     Paroxysmal atrial fibrillation ([address]     Dyslipidemia        Discussion: This gentleman appears to be reasonably well at this juncture. He is not having any symptoms to suggest symptomatic recurrent obstructive coronary disease or any heart failure issues and I would plan to simply continue him on his current medical regimen with regard to his heart disease.     He is on amiodarone at 200 mg daily in an attempt to keep him in sinus rhythm and I will check his records to see if he has had pulmonary function tests or recent thyroid function tests and if not I would consider getting some baseline testing in that regard. His latest lipid profile is not available to me at this time but historically his LDL cholesterols have been above 100 and I think we should try to get them down at least under 70 if not 60 moving forward. If he has not had a recent lipid profile I will get one done but since historically he was not well-controlled on 40 mg of Lipitor I am going to plan to increase him to 80 mg of Lipitor if his cholesterol remains elevated. His blood pressure appear to be adequately controlled today and he otherwise seems to be stable with a completely normal EKG so I am going to plan to see him again in several months. PCP: Bela De Leon MD      Past Medical History:   Diagnosis Date    Acute blood loss as cause of postoperative anemia 4/4/2017    Anticoagulated on Coumadin     Aortoiliac occlusive disease (HCC) 1/25/2017    Atherosclerosis of native artery of both lower extremities with intermittent claudication ([address] 1/25/2017    Benign hypertensive heart disease with systolic congestive heart failure, NYHA class 2 ([address] 1/26/2015    Carotid artery disease (HCC)     Chronic anemia     Chronic systolic heart failure (HCC)     Chronic ulcer of right foot ([address] 4/4/2017    Chronic ulcer of right heel ([address] 8/8/2017    Coronary artery disease involving native coronary artery of native heart 3/15/2017    Successful stenting of Cx (Xience LESLEY) and RCA (Xience LESLEY) to 0% by Dr. Eduard Cotton on 3/15/17.     DDD (degenerative disc disease), lumbar 1/26/2015    Dyslipidemia     Erectile dysfunction 7/5/2016    Euthyroid sick syndrome 4/6/2017    Hereditary peripheral neuropathy 11/15/2016    History of cardioversion 4/18/2017    S/P Synchronized external cardioversion (4/18/2017 - Dr. Eve Soto)    History of vitamin D deficiency 4/22/2017    Vitamin D 25-Hydroxy (4/22/2017) = 12.0; Vitamin D 25-Hydroxy (5/26/2017) = 38.7    Infection of vascular bypass graft ([address] 7/24/2017    Left lower extremity    Ischemic cardiomyopathy     Moderate to severe pulmonary hypertension 7/23/2017    Paroxysmal atrial fibrillation ([address]     Peripheral artery disease ([address] 1/25/2017    Skin ulcer of malleolar area of right ankle ([address]     Spinal stenosis of lumbar region with radiculopathy 5/4/2015    Dr. India Palmer       Past Surgical History:   Procedure Laterality Date    CARDIAC CATHETERIZATION  3/8/2017         CARDIAC CATHETERIZATION  3/15/2017         CORONARY STENT SINGLE W/PTCA  3/15/2017         HX ABOVE KNEE AMPUTATION Left 08/18/2017    S/P Left above-the-knee amputation (8/18/2017 - Dr. Elida Smith)    HX ATHERECTOMY Left 06/15/2017    S/P Atherectomy and balloon angioplasty of the left superficial femoral artery and above-knee popliteal artery (6/15/2017 - Dr. Elida Smith)    HX ATHERECTOMY Right 09/07/2017    S/P Atherectomy and balloon angioplasty of the below-the-knee popliteal artery, above-the-knee popliteal artery, tibioperoneal trunk artery and posterior tibial artery (9/7/2017 - Dr. Elida Smith)    HX COLONOSCOPY  07/23/2015    polyps repeat 2020 5 years Gemma Henderson    [address] Right 04/04/2017    S/P Right axillary to bifemoral artery bypass using an 8 mm Propaten graft from the right axilla to the right common femoral artery with a jump femoral-femoral bypass with another 8 mm Propaten external ring bypass; Right common femoral artery, and profunda femoris artery and superficial femoral artery endarterectomy causing an extensive surgery on the right side (4/4/2017 - Dr. Tremayne Strauss)    HX FEMORAL BYPASS Right 04/07/2017    S/P Cutdown of femoral-femoral bypass, more on the left groin side; Right lower extremity angiography with first-order catheterization (4/7/2017 - Dr. Madelyn Olsen)    HX FEMORAL BYPASS Right 04/10/2017    S/P Right femoral to above-knee popliteal bypass with an 8 mm PTFE graft (4/10/2017 - Dr. Lenny Bravo)    HX OTHER SURGICAL Left 07/25/2017    S/P Removal of infected graft; Repair of left superficial femoral artery; Repair of left common femoral artery (7/25/2017 - Dr. Madelyn Olsen)    HX OTHER SURGICAL Right 06/27/2017    S/P Drainage of right side abscess (6/27/2017 - Dr. Madelyn Olsen)    HX OTHER SURGICAL Left 07/01/2017    S/P Left groin exploration; Left femoral repair pseudoaneurysm; Left wound washout; Wound VAC placement (7/01/2017 - Dr. Madelyn Olsen)    HX OTHER SURGICAL Left 07/04/2017    S/P Left common femoral artery ligation; Jump bypass from right to left fem-fem to a more distal superficial femoral artery using 8 mm external ringed Propaten graft; Left groin wound exploration and washout (7/4/2017 - Dr. Madelyn Olsen)    HX OTHER SURGICAL Right 08/18/2017    S/P Right axillary femoral jump bypass interposition; Removal of infected right axillary femoral bypass; Wound VAC placement of upper thigh 1.2 cm x 5.2 cm x 1.8 cm and groin 4 cm x 0.5 cm x 0.2 cm (8/18/2017 - Dr. Madelyn Olsen)       Current Outpatient Prescriptions   Medication Sig    atorvastatin (LIPITOR) 80 mg tablet Take 80 mg by mouth daily.  zinc sulfate (ZINCATE) 220 (50) mg capsule take 1 tablet by mouth once daily    gabapentin (NEURONTIN) 400 mg capsule Take 1 Cap by mouth three (3) times daily.  furosemide (LASIX) 20 mg tablet 1 tablet daily  Indications: Pulmonary Edema due to Chronic Heart Failure    cholecalciferol (VITAMIN D3) 1,000 unit cap Take  by mouth daily.  metoprolol tartrate (LOPRESSOR) 25 mg tablet Take 1 Tab by mouth every twelve (12) hours.  Indications: hypertension, VENTRICULAR RATE CONTROL IN ATRIAL FIBRILLATION    magnesium oxide (MAG-OX) 400 mg tablet Take 1 Tab by mouth daily.  losartan (COZAAR) 50 mg tablet Take 1 Tab by mouth daily.  amiodarone (CORDARONE) 200 mg tablet take 1 tablet by mouth once daily INDICATION: VENTRICULAR RATE CONTROL IN ATRIAL FIBRILLATION    multivitamin (ONE A DAY) tablet Take 1 Tab by mouth daily.  potassium chloride (KLOR-CON) 20 mEq packet Take 1 Packet by mouth daily. [while on Furosemide]  Indications: hypokalemia prevention    clopidogrel (PLAVIX) 75 mg tab Take 75 mg by mouth daily.  aspirin 81 mg chewable tablet Take 1 Tab by mouth daily (with breakfast). No current facility-administered medications for this visit. Allergies   Allergen Reactions    Morphine Other (comments)     Patient gets confused with morphine. Social History :  Social History   Substance Use Topics    Smoking status: Former Smoker    Smokeless tobacco: Never Used      Comment: quit in 2011    Alcohol use 1.2 oz/week     2 Cans of beer per week      Comment: 10 cans of beer a month        Family History: family history includes Heart Disease in his father. Review of Systems:  Constitutional: Negative for chills, fever, malaise/fatigue and weight loss. Respiratory: Negative for cough, hemoptysis, shortness of breath and wheezing. Cardiovascular: Negative for chest pain, palpitations, orthopnea and leg swelling. Gastrointestinal: Positive for melena. Negative for abdominal pain, blood in stool, constipation, diarrhea, heartburn, nausea and vomiting. Musculoskeletal: Negative for falls, joint pain and myalgias. Neurological: Negative for dizziness. Physical Exam:    The patient is a cooperative, alert, well developed, well nourished 59 y.o.  male who is in no acute distress at the time of the examination.   Visit Vitals    /76    Pulse 63    Ht 5' 10\" (1.778 m)    Wt 83 kg (183 lb)    SpO2 98%    BMI 26.26 kg/m2       HEENT: Conjuctiva white, mucosa moist, no pallor or cyanosis. NECK: Supple without masses, tenderness or thyromegaly. There was no jugular venous distention. Carotid are full bilaterally without bruits. CHEST: Symmetrical with good excursion. LUNGS: Clear to auscultation in all fields. HEART: The apex is not displaced. There were no lifts, thrills or heaves. There is a normal S1 and S2 without appreciable murmurs, rubs, clicks, or gallops auscultated. ABDOMEN: Soft without masses, tenderness or organomegaly. EXTREMITIES: 1 + peripheral pulses on the right leg without edema and left AKA. INTEGUMENT: Warm and dry   NEUROLOGICAL: The patient is oriented x 3 with motor function grossly intact. Review of Data: See PMH and Cardiology and Imaging sections for cardiac testing  Lab Results   Component Value Date/Time    Cholesterol, total 157 12/29/2015 07:18 AM    HDL Cholesterol 28 (L) 12/29/2015 07:18 AM    LDL, calculated 112.4 (H) 12/29/2015 07:18 AM    Triglyceride 83 12/29/2015 07:18 AM    CHOL/HDL Ratio 5.6 (H) 12/29/2015 07:18 AM       Results for orders placed or performed in visit on 03/06/18   AMB POC EKG ROUTINE W/ 12 LEADS, INTER & REP     Status: None    Narrative    Normal sinus rhythm, rate 63. This EKG is within normal limits and similar to the EKG of October 5, 2017. Gael Conroy D.O., F.A.C.C. [facility]  [facility]  [address]. [address]      PLEASE NOTE:  This document has been produced using voice recognition software. Unrecognized errors in transcription may be present.

## 2018-03-07 ENCOUNTER — DOCUMENTATION ONLY (OUTPATIENT)
Dept: CARDIOLOGY CLINIC | Age: 58
End: 2018-03-07

## 2018-03-07 RX ORDER — ATORVASTATIN CALCIUM 80 MG/1
80 TABLET, FILM COATED ORAL DAILY
Qty: 90 TAB | Refills: 3 | Status: SHIPPED | OUTPATIENT
Start: 2018-03-07 | End: 2018-03-23 | Stop reason: ALTCHOICE

## 2018-03-07 NOTE — PROGRESS NOTES
Per Dr Bakari Tripathi, call patient and get cholesterol labs done now instead of later in 2 months. Patient daughter called and made aware.       Verbal order and read back per Isaac Vogt, DO

## 2018-03-15 ENCOUNTER — HOSPITAL ENCOUNTER (OUTPATIENT)
Dept: LAB | Age: 58
Discharge: HOME | End: 2018-03-15

## 2018-03-15 PROCEDURE — 99001 SPECIMEN HANDLING PT-LAB: CPT | Performed by: INTERNAL MEDICINE

## 2018-03-16 ENCOUNTER — OFFICE VISIT (OUTPATIENT)
Dept: VASCULAR SURGERY | Age: 58
End: 2018-03-16

## 2018-03-16 DIAGNOSIS — I70.213 ATHEROSCLEROSIS OF NATIVE ARTERY OF BOTH LOWER EXTREMITIES WITH INTERMITTENT CLAUDICATION (HCC): Chronic | ICD-10-CM

## 2018-03-16 DIAGNOSIS — I74.09 AORTOILIAC OCCLUSIVE DISEASE (HCC): Chronic | ICD-10-CM

## 2018-03-16 DIAGNOSIS — I77.9 BILATERAL CAROTID ARTERY DISEASE (HCC): Chronic | ICD-10-CM

## 2018-03-16 DIAGNOSIS — I73.9 PVD (PERIPHERAL VASCULAR DISEASE) (HCC): Primary | ICD-10-CM

## 2018-03-16 DIAGNOSIS — Z95.828 HISTORY OF ARTERIAL BYPASS OF LOWER EXTREMITY: ICD-10-CM

## 2018-03-16 LAB
CHOLEST SERPL-MCNC: 117 MG/DL (ref 100–199)
HDLC SERPL-MCNC: 30 MG/DL
INTERPRETATION, 910389: NORMAL
LDLC SERPL CALC-MCNC: 65 MG/DL (ref 0–99)
TRIGL SERPL-MCNC: 108 MG/DL (ref 0–149)
VLDLC SERPL CALC-MCNC: 22 MG/DL (ref 5–40)

## 2018-03-16 NOTE — PROCEDURES
[facility]  *** FINAL REPORT ***    Name: Sybil Morales  MRN: [id number]       Outpatient  : 22 Mar 1960  HIS Order #: [id number]  [id number] [facility] Visit #: [id number]  Date: 16 Mar 2018    TYPE OF TEST: Peripheral Arterial Testing    REASON FOR TEST  Peripheral vascular dz NOS    Right Leg  Segmentals: Abnormal                     mmHg  Brachial         127  High thigh  Low thigh  Calf  Posterior tibial 107  Dorsalis pedis    92  Peroneal  Metatarsal  Toe pressure      48  Doppler:    Abnormal  Ankle/Brachial: 0.79    Left Leg  Segmentals:                     mmHg  Brachial         135  High thigh  Low thigh  Calf  Posterior tibial  Dorsalis pedis  Peroneal  Metatarsal  Toe pressure  Doppler:    Not examined  Ankle/Brachial:    INTERPRETATION/FINDINGS  Physiologic testing was performed using continuous wave doppler and  segmental pressures. MARINA/DBI only:  1. Moderate peripheral arterial disease indicated at rest in the right   leg. 2. Left AKA noted. 3. The right ankle/brachial index is 0.79.  3. The right DBI is 0.36. ADDITIONAL COMMENTS    I have personally reviewed the data relevant to the interpretation of  this  study. TECHNOLOGIST: Jevon Weller RVT, BS  Signed: 2018 09:49 AM    PHYSICIAN: STEW Land   Signed: 2018 04:01 PM

## 2018-03-16 NOTE — PROCEDURES
[facility]  *** FINAL REPORT ***    Name: Kymberly Guerra  MRN: [id number]       Outpatient  : 22 Mar 1960  HIS Order #: [id number]  [place] Visit #: [id number]  Date: 16 Mar 2018    TYPE OF TEST: Aorto-Iliac Duplex    REASON FOR TEST  Peripheral Arterial Disease    B-Mode:-                 (cm)   1     2     3  Aortic diameter:         AP:                           TV:  Common iliac diameter:   Right:                           Left:    Duplex:-                           PSV  Stenosis                           ----- --------------------  Aorta: (1)         (2)         (3)    Right common iliac:  Right external iliac:    Left common iliac:  Left external iliac:    INTERPRETATION/FINDINGS  Duplex images were obtained using 2-D gray scale, color flow and  spectral doppler analysis. Ax Fem BP. Patent axillary to right common femoral artery bypass graft without   significant stenosis. 2. Biphasic to poorly biphasic signals noted throughout. 3. Continued evidence of perigraft fluid/collection at the  umbilicus/hip level measuring appoximately 2.4 x 3.3 cm Trv as  compared with previous study on 01/10/18 with a measurement of 2.4 x  3.0 cm Trv.  4. The right MARINA suggest moderate arterial insufficiency at rest.  The   right MARINA is 0.79. ADDITIONAL COMMENTS  Inflow: 127 cm/sec    Prx Anast: 173 cm/sec    Prx 210 cm/sec    Abv  Ax: 110 cm/sec    Ax: 103 cm/sec   Belo Ax: 113 cm/sec    Above Ribline: 100 cm/sec  Ribline: 97 cm/sec     Below Ribline: 98 cm/sec   Above Hip: 108 cm/sec   Hip: 99 cm/sec   Below Hip: 110 cm/sec       Dst 112 cm/sec    Dst Anast: 92 cm/sec    Outflow 88 cm/sec    I have personally reviewed the data relevant to the interpretation of  this  study. TECHNOLOGIST: Nadine Weller RVT, BS  Signed: 2018 10:37 AM    PHYSICIAN: STEW Land   Signed: 2018 04:03 PM

## 2018-03-16 NOTE — PROCEDURES
[facility]  *** FINAL REPORT ***    Name: Cory Singletary  MRN: [id number]       Outpatient  : 22 Mar 1960  HIS Order #: [id number]  [id number] [facility] Visit #: [id number]  Date: 16 Mar 2018    TYPE OF TEST: Cerebrovascular Duplex    REASON FOR TEST  Carotid disease    Right Carotid:-             Proximal               Mid                 Distal  cm/s  Systolic  Diastolic  Systolic  Diastolic  Systolic  Diastolic  CCA:     37.5       8.0                            85.0      11.0  Bulb:  ECA:    110.0      13.0  ICA:      0.0       0.0        0.0       0.0  ICA/CCA:  0.0       0.0    ICA Stenosis: Occluded    Right Vertebral:-  Finding: Antegrade  Sys:      113.0  Maria D:       22.0    Right Subclavian:    Left Carotid:-            Proximal                Mid                 Distal  cm/s  Systolic  Diastolic  Systolic  Diastolic  Systolic  Diastolic  CCA:    710.1      18.0                            67.0      10.0  Bulb:    83.0      21.0  ECA:     74.0       8.0  ICA:     92.0      34.0       77.0      31.0       88.0      30.0  ICA/CCA:  0.9       1.9    ICA Stenosis: <50%    Left Vertebral:-  Finding: Not visualized  Sys:  Maria D:    Left Subclavian:    INTERPRETATION/FINDINGS  Duplex images were obtained using 2-D gray scale, color flow and  spectral doppler analysis. 1. Chronic occlusion of the right internal carotid artery, cannot  exclude extremely low flow. 2. Mild plaquing of the left internal carotid artery in the less than  50% range. 3. No significant stenosis in the external carotid arteries  bilaterally. 4. Antegrade flow in the right vertebral artery. 5. Unable to visualize the left vertebral artery, possible occlusion. Plaque Morphology:     Heterogeneous plaque in the bulb and left ICA. No significant changes when compared to previous study. ADDITIONAL COMMENTS    I have personally reviewed the data relevant to the interpretation of  this  study. TECHNOLOGIST: Rubina Weller RVT, ANUM  Signed: 03/16/2018 10:16 AM    PHYSICIAN: Sumi Weinberg D.O.   Signed: 03/16/2018 04:02 PM

## 2018-03-16 NOTE — PROCEDURES
[facility]  *** FINAL REPORT ***    Name: Simone Grimaldo  MRN: [id number]       Outpatient  : 22 Mar 1960  HIS Order #: [id number]  [id number] [facility] Visit #: [id number]  Date: 16 Mar 2018    TYPE OF TEST: Bypass Graft Duplex    REASON FOR TEST  PVD, f/u Revasc    Graft:-  Summary:   Right common femoral - popliteal (above knee) bypass with  ptfe  Op. Date:  2017  Surgeon:   Nancy Ro    Results:-            Velocity  Ratio  Stenosis         Waveform            --------  -----  --------         ----------  Inflow:     92.0  Proximal:   88.0      1.0  Normal           Biphasic  Upper:      57.0      0.6  Normal           Biphasic  Mid-graft:  48.0      0.8  Normal           Biphasic  Lower:      69.0      1.4  Normal           Biphasic  Distal:     94.0      1.4  Normal           Biphasic  Outflow:    70.0      0.7  Normal           Biphasic    MARINA:    INTERPRETATION/FINDINGS  Duplex images were obtained using 2-D gray scale, color flow and  spectral doppler analysis. Duplex exam of the bypass graft reveals :  1. Patent right common femoral artery to popltieal artery bypass graft   without significant stenosis. 2. Biphasic to poorly biphasic signals noted throughout. 3. The right ankle/brachial index is 0.79.  4. Unable to detect Doppler signals in the remnants of the right to  left femoral artery to femoral artery bypass graft. Essentially no significant changes as compared with previous study. ADDITIONAL COMMENTS    I have personally reviewed the data relevant to the interpretation of  this  study. TECHNOLOGIST: Marleny Weller RVT, ANUM  Signed: 2018 10:24 AM    PHYSICIAN: Ranulfo Donis D.O.   Signed: 2018 04:01 PM

## 2018-03-20 NOTE — PROGRESS NOTES
Per your last office note, \"His latest lipid profile is not available to me at this time but historically his LDL cholesterols have been above 100 and I think we should try to get them down at least under 70 if not 60 moving forward. If he has not had a recent lipid profile I will get one done but since historically he was not well-controlled on 40 mg of Lipitor I am going to plan to increase him to 80 mg of Lipitor if his cholesterol remains elevated. \"

## 2018-03-22 ENCOUNTER — TELEPHONE (OUTPATIENT)
Dept: CARDIOLOGY CLINIC | Age: 58
End: 2018-03-22

## 2018-03-22 NOTE — TELEPHONE ENCOUNTER
Patient's daughter Jose Gonsalez) called to get results of lab work from 3/15/2018 and to verify dose of atorvastatin. Informed her per Dr Cayla Newman note on labs: Notes Recorded by Quique Robb DO on 3/22/2018 at 10:29 AM  This patient's cholesterol looks great and at goal. Shanna Chinchilla would continue him on Lipitor 80 mg daily.  Please let him know. MARICARMEN Salazar indicated understanding of patient's medication dosage change to atorvastatin 80mg from 40mg.

## 2018-03-22 NOTE — PROGRESS NOTES
This patient's cholesterol looks great and at goal.  I would continue him on Lipitor 80 mg daily. Please let him know.  ES

## 2018-03-23 ENCOUNTER — TELEPHONE (OUTPATIENT)
Dept: CARDIOLOGY CLINIC | Age: 58
End: 2018-03-23

## 2018-03-23 RX ORDER — ATORVASTATIN CALCIUM 40 MG/1
40 TABLET, FILM COATED ORAL DAILY
Qty: 90 TAB | Refills: 3 | Status: SHIPPED | OUTPATIENT
Start: 2018-03-23 | End: 2018-06-21 | Stop reason: DRUGHIGH

## 2018-03-23 RX ORDER — ATORVASTATIN CALCIUM 40 MG/1
TABLET, FILM COATED ORAL DAILY
COMMUNITY
End: 2018-03-23 | Stop reason: SDUPTHER

## 2018-03-23 NOTE — TELEPHONE ENCOUNTER
LMOM for patient daughter to call back in reference to below results.       Verbal order and read back per Pema Tyson DO

## 2018-03-23 NOTE — TELEPHONE ENCOUNTER
----- Message from Cheryl Morrison DO sent at 3/22/2018 10:29 AM EDT -----  This patient's cholesterol looks great and at goal.  I would continue him on Lipitor 80 mg daily. Please let him know.  ES

## 2018-03-23 NOTE — TELEPHONE ENCOUNTER
Patient's daughter called back. Patient's daughter states that the patient has not taken 80mg of Lipitor. States that he has been taking 40mg of Lipitor all along. Advised her that we would send a new rx for atorvastatin 40mg daily to [address] [place] [address].  Patient daughter verbalized understanding.       Verbal order and read back per Zilphia Clock, DO

## 2018-03-23 NOTE — TELEPHONE ENCOUNTER
Patient daughter called in reference to results of patient's cholesterol. She was made aware of results. She also states that patient has only been taking atorvastatin 40mg daily all along. He has never taken 80mg daily. I made the change and she requested a refill of 40mg tablets. Per Dr Sanchez Yadav last note, patient clearly at goal with last cholesterol results.        Verbal order and read back per Jaiden Hernández NP

## 2018-03-27 NOTE — TELEPHONE ENCOUNTER
This RX is being requested by [facility] and the pt just went to his podiatrist(Cynthia Denis- [phone])  and was told to start taking it 4  times daily for the neuropathic pain. Would like the refill to show the increase   Please advise.

## 2018-03-29 ENCOUNTER — OFFICE VISIT (OUTPATIENT)
Dept: VASCULAR SURGERY | Age: 58
End: 2018-03-29

## 2018-03-29 VITALS
DIASTOLIC BLOOD PRESSURE: 74 MMHG | HEIGHT: 70 IN | BODY MASS INDEX: 26.2 KG/M2 | SYSTOLIC BLOOD PRESSURE: 160 MMHG | WEIGHT: 183 LBS | RESPIRATION RATE: 22 BRPM

## 2018-03-29 DIAGNOSIS — I73.9 PAD (PERIPHERAL ARTERY DISEASE) (HCC): ICD-10-CM

## 2018-03-29 DIAGNOSIS — T87.89 STUMP PAIN (HCC): ICD-10-CM

## 2018-03-29 DIAGNOSIS — Z89.612 STATUS POST ABOVE KNEE AMPUTATION OF LEFT LOWER EXTREMITY: Primary | ICD-10-CM

## 2018-03-29 DIAGNOSIS — M79.609 STUMP PAIN (HCC): ICD-10-CM

## 2018-03-29 DIAGNOSIS — Z95.828 STATUS POST FEMORAL-POPLITEAL BYPASS SURGERY: ICD-10-CM

## 2018-03-29 DIAGNOSIS — I74.09 AORTOILIAC OCCLUSIVE DISEASE (HCC): Chronic | ICD-10-CM

## 2018-03-29 RX ORDER — GABAPENTIN 400 MG/1
CAPSULE ORAL
Qty: 270 CAP | Refills: 0 | Status: SHIPPED | OUTPATIENT
Start: 2018-03-29

## 2018-03-29 NOTE — PROGRESS NOTES
1. Have you been to an emergency room or urgent care clinic since your last visit? No  Hospitalized since your last visit? If yes, where, when, and reason for visit? No  2. Have you seen or consulted any other health care providers outside of the [facility] since your last visit including any procedures, health maintenance items. If yes, where, when and reason for visit?

## 2018-03-29 NOTE — MR AVS SNAPSHOT
Samm Anson 
 
 
 [address] [facility] 
[phone] Patient: Yvette Caraballo MRN: [id number] MAGGI:[id number] Visit Information Date & Time Provider Department Dept. Phone Encounter #  
 3/29/2018 10:00 AM [id number] [facility] [phone] [id number] Your Appointments 6/19/2018  9:15 AM  
FOLLOW UP EXAM with Brutus Sandifer, MD  
[address] [facility] ([facility]) Appt Note: 4 mth, f/u  
 [address] [facility] [address] [facility]  
  
    
 9/4/2018 10:20 AM  
Follow Up with Lorena Robles DO [facility] ([facility]) Appt Note: 6 month follow up Priyanka Duke [id number] [phone] [address] [place] [address] [address] Upcoming Health Maintenance Date Due ZOSTER VACCINE AGE 60> 1/22/2018 COLONOSCOPY 7/23/2020 DTaP/Tdap/Td series (2 - Td) 7/5/2026 Allergies as of 3/29/2018  Review Complete On: 3/29/2018 By: Pramod Gaitan LPN Severity Noted Reaction Type Reactions Morphine  08/03/2017   Side Effect Other (comments) Patient gets confused with morphine. Current Immunizations  Reviewed on 11/20/2017 Name Date Influenza Vaccine (Quad) PF 11/20/2017 Tdap 7/5/2016 Not reviewed this visit You Were Diagnosed With   
  
 Codes Comments Status post above knee amputation of left lower extremity ([facility]    -  Primary ICD-10-CM: I50.602 ICD-9-CM: V49.76 Stump pain ([facility]     ICD-10-CM: T87.89, G89.18 
ICD-9-CM: 997.69 Status post femoral-popliteal bypass surgery     ICD-10-CM: Z95.828 ICD-9-CM: V45.89 PAD (peripheral artery disease) (HCC)     ICD-10-CM: I73.9 ICD-9-CM: 443.9 Aortoiliac occlusive disease (HCC)     ICD-10-CM: I74.09 
ICD-9-CM: 444.09 Vitals BP Resp Height(growth percentile) Weight(growth percentile) BMI Smoking Status 160/74 (BP 1 Location: Left arm, BP Patient Position: Sitting) 22 5' 10\" (1.778 m) 183 lb (83 kg) 26.26 kg/m2 Former Smoker Vitals History BMI and BSA Data Body Mass Index Body Surface Area  
 26.26 kg/m 2 2.02 m 2 Preferred Pharmacy Pharmacy Name Phone [facility] Your Updated Medication List  
  
   
This list is accurate as of 3/29/18 10:32 AM.  Always use your most recent med list.  
  
  
  
  
 amiodarone 200 mg tablet Commonly known as:  CORDARONE  
take 1 tablet by mouth once daily INDICATION: VENTRICULAR RATE CONTROL IN ATRIAL FIBRILLATION  
  
 aspirin 81 mg chewable tablet Take 1 Tab by mouth daily (with breakfast). atorvastatin 40 mg tablet Commonly known as:  LIPITOR Take 1 Tab by mouth daily. furosemide 20 mg tablet Commonly known as:  LASIX  
1 tablet daily  Indications: Pulmonary Edema due to Chronic Heart Failure  
  
 gabapentin 400 mg capsule Commonly known as:  NEURONTIN Take 1 Cap by mouth three (3) times daily. losartan 50 mg tablet Commonly known as:  COZAAR Take 1 Tab by mouth daily. magnesium oxide 400 mg tablet Commonly known as:  MAG-OX Take 1 Tab by mouth daily. metoprolol tartrate 25 mg tablet Commonly known as:  LOPRESSOR Take 1 Tab by mouth every twelve (12) hours. Indications: hypertension, VENTRICULAR RATE CONTROL IN ATRIAL FIBRILLATION  
  
 multivitamin tablet Commonly known as:  ONE A DAY Take 1 Tab by mouth daily. PLAVIX 75 mg Tab Generic drug:  clopidogrel Take 75 mg by mouth daily. potassium chloride 20 mEq packet Commonly known as:  KLOR-CON Take 1 Packet by mouth daily. [while on Furosemide]  Indications: hypokalemia prevention VITAMIN D3 1,000 unit Cap Generic drug:  cholecalciferol Take  by mouth daily. zinc sulfate 220 (50) mg capsule Commonly known as:  ZINCATE  
take 1 tablet by mouth once daily To-Do List   
 04/05/2018 11:00 AM  
  Appointment with [facility] RM 1 at [facility] ([phone]) DIET RESTRICTIONS  Nothing to eat or drink 4 hours prior to study May have water to take meds  GENERAL INSTRUCTIONS  If you were given medications to take for a contrast allergy prior to having this study, please arrange to have someone drive you to your appointment. If you have had a creatinine level drawn within the past 30 days, please bring most recent results to your appt. This study does not require you to drink contrast prior to your study. MEDICATIONS  Bring a complete list of all medications you are currently taking to include prescriptions, over-the-counter meds, herbals, vitamins & any dietary supplements. OUTSIDE FILMS  Bring outside films, CDs, and reports related to the study with you on the day of your exam.  QUESTIONS  Notify the CT Department if you have any questions concerning your study. [place] - [phone] [facility] [phone]  
  
 04/06/2018 Imaging:  CTA ABD ART W RUNOFF W WO CONT Referral Information Referral ID Referred By Referred To  
  
 [id number] RITU CRUMP Not Available Visits Status Start Date End Date 1 New Request 3/29/18 3/29/19 If your referral has a status of pending review or denied, additional information will be sent to support the outcome of this decision. Please provide this summary of care documentation to your next provider. Your primary care clinician is listed as Kenia Adams. If you have any questions after today's visit, please call [phone].

## 2018-03-29 NOTE — PROGRESS NOTES
Yvette Caraballo    Chief Complaint   Patient presents with    Leg Pain       History and Physical    Yvette Caraballo is a 59 y.o. male with complicated vascular history and multiple vascular surgeries. He has history of ax-fem bypass with jump fem-fem bypass and right fem-pop bypass in April of 2017. He developed an abscess over his ax-fem bypass requiring I&D and repair of left CFA pseudoaneurysm in June 2017. He unfortunately had blow out of his left femoral anastomosis and had left CFA ligation with jump bypass from right to left fem-fem bypass in July 2017. Shortly after he later developed infection of left groin with exposed graft and underwent removal of infected graft, Repair of superficial femoral artery, left side; Repair of common femoral artery in July 2017. While at rehab, he developed acute LLE ischemia, infection of right axillofemoral bypass graft and was brought back into the hospital in August 2017 and ultimately requiring left AKA and removal of infected right axillary femoral graft with placement of right axillary to femoral jump bypass. He had gangrenous changes to his right foot and underwent right leg angio in September 2017. He then suffered a fall onto his left stump requiring washout of hematoma and revision of stump later that month. He had been doing very well since that time. He did develop a mass just above the right groin which was aspirated in January 2018 and found to be seroma. He presents today in follow-up after recent arterial studies. He states that he is doing quite well and is ambulating today with his prosthetic. He is using crutches for assistance. He states that he would be ambulating with a cane however he is having some pain in his left stump. He states that his physical therapist is worried that he may have a bone spur which is causing his pain. The area is very tender even to light touch at the medial most distal aspect of his stump.   There is no sign of abscess or ischemia. Denies any fevers or chills. His right foot wounds have completely healed at this point and he does follow very closely with podiatry. He states that his right leg is doing quite well but he does get some intermittent neuropathic pains in his toes on occasion. He is being treated with gabapentin for this. He points out an area in the left groin which has been intermittently draining some dark bloody drainage. This area started as what appeared to be a pimple he states. He denies any purulent drainage coming from the area. He denies any pain. He states that his prosthetic does not rub this area that it sits much lower. He does also have known occlusion of his right internal carotid artery with mild less than 50% stenosis on the left. He denies any strokelike symptoms or vision changes. Study showed that his bypasses are patent with no significant stenosis and biphasic flow throughout. The right lower abdominal seroma is unchanged in size. He states that this area does not bother him and is not painful. Patient is anxious to get back to work and is asking for clearance today. Past Medical History:   Diagnosis Date    Acute blood loss as cause of postoperative anemia 4/4/2017    Anticoagulated on Coumadin     Aortoiliac occlusive disease (HCC) 1/25/2017    Atherosclerosis of native artery of both lower extremities with intermittent claudication ([address] 1/25/2017    Benign hypertensive heart disease with systolic congestive heart failure, NYHA class 2 ([address] 1/26/2015    Carotid artery disease (HCC)     Chronic anemia     Chronic systolic heart failure (HCC)     Chronic ulcer of right foot ([address] 4/4/2017    Chronic ulcer of right heel ([address] 8/8/2017    Coronary artery disease involving native coronary artery of native heart 3/15/2017    Successful stenting of Cx (Xience LESLEY) and RCA (Xience LESLEY) to 0% by Dr. Jhon Mann on 3/15/17.     DDD (degenerative disc disease), lumbar 1/26/2015    Dyslipidemia     Erectile dysfunction 7/5/2016    Euthyroid sick syndrome 4/6/2017    Hereditary peripheral neuropathy 11/15/2016    History of cardioversion 4/18/2017    S/P Synchronized external cardioversion (4/18/2017 - Dr. Génesis Chen)    History of vitamin D deficiency 4/22/2017    Vitamin D 25-Hydroxy (4/22/2017) = 12.0; Vitamin D 25-Hydroxy (5/26/2017) = 38.7    Infection of vascular bypass graft ([facility] 7/24/2017    Left lower extremity    Ischemic cardiomyopathy     Moderate to severe pulmonary hypertension 7/23/2017    Paroxysmal atrial fibrillation ([address]     Peripheral artery disease ([facility] 1/25/2017    Skin ulcer of malleolar area of right ankle ([facility]     Spinal stenosis of lumbar region with radiculopathy 5/4/2015    Dr. Cassie Parra     Past Surgical History:   Procedure Laterality Date    CARDIAC CATHETERIZATION  3/8/2017         CARDIAC CATHETERIZATION  3/15/2017         CORONARY STENT SINGLE W/PTCA  3/15/2017         HX ABOVE KNEE AMPUTATION Left 08/18/2017    S/P Left above-the-knee amputation (8/18/2017 - Dr. Cameron Fuller)    HX ATHERECTOMY Left 06/15/2017    S/P Atherectomy and balloon angioplasty of the left superficial femoral artery and above-knee popliteal artery (6/15/2017 - Dr. Cameron Fuller)    HX ATHERECTOMY Right 09/07/2017    S/P Atherectomy and balloon angioplasty of the below-the-knee popliteal artery, above-the-knee popliteal artery, tibioperoneal trunk artery and posterior tibial artery (9/7/2017 - Dr. Cameron Fuller)    HX COLONOSCOPY  07/23/2015    polyps repeat 2020 5 years [address] Right 04/04/2017    S/P Right axillary to bifemoral artery bypass using an 8 mm Propaten graft from the right axilla to the right common femoral artery with a jump femoral-femoral bypass with another 8 mm Propaten external ring bypass; Right common femoral artery, and profunda femoris artery and superficial femoral artery endarterectomy causing an extensive surgery on the right side (4/4/2017 - Dr. Sherrell Lyons)   [address] Right 04/07/2017    S/P Cutdown of femoral-femoral bypass, more on the left groin side; Right lower extremity angiography with first-order catheterization (4/7/2017 - Dr. Laura Mohr)    HX FEMORAL BYPASS Right 04/10/2017    S/P Right femoral to above-knee popliteal bypass with an 8 mm PTFE graft (4/10/2017 - Dr. Sharyle Judge)    HX OTHER SURGICAL Left 07/25/2017    S/P Removal of infected graft; Repair of left superficial femoral artery; Repair of left common femoral artery (7/25/2017 - Dr. Laura Mohr)    HX OTHER SURGICAL Right 06/27/2017    S/P Drainage of right side abscess (6/27/2017 - Dr. Laura Mohr)    HX OTHER SURGICAL Left 07/01/2017    S/P Left groin exploration; Left femoral repair pseudoaneurysm; Left wound washout; Wound VAC placement (7/01/2017 - Dr. Laura Mohr)    HX OTHER SURGICAL Left 07/04/2017    S/P Left common femoral artery ligation; Jump bypass from right to left fem-fem to a more distal superficial femoral artery using 8 mm external ringed Propaten graft; Left groin wound exploration and washout (7/4/2017 - Dr. Laura Mohr)    HX OTHER SURGICAL Right 08/18/2017    S/P Right axillary femoral jump bypass interposition; Removal of infected right axillary femoral bypass;  Wound VAC placement of upper thigh 1.2 cm x 5.2 cm x 1.8 cm and groin 4 cm x 0.5 cm x 0.2 cm (8/18/2017 - Dr. Laura Mohr)     Patient Active Problem List   Diagnosis Code    Benign hypertensive heart disease with systolic congestive heart failure, NYHA class 2 ([facility]) I11.0, I50.20    DDD (degenerative disc disease), lumbar M51.36    Erectile dysfunction N52.9    Spinal stenosis of lumbar region with radiculopathy M48.061, M54.16    Hereditary peripheral neuropathy G60.9    [facility]ili[facility] occlusive disease ([facility] I74.09    Atherosclerosis of native artery of both lower extremities with intermittent claudication ([facility]) I70.213    Peripheral artery disease ([facility]) I73.9    Coronary artery disease involving native coronary artery of native heart I25.10    Paroxysmal atrial fibrillation ([facility]) I48.0    Acute blood loss as cause of postoperative anemia D62    Impaired mobility and ADLs Z74.09    Ischemic cardiomyopathy I25.5    Chronic systolic heart failure ([facility]) I50.22    Carotid artery disease ([facility]) I77.9    Dyslipidemia E78.5    Euthyroid sick syndrome E07.81    Aftercare following surgery of the circulatory system Z48.812    Status post femoral-popliteal bypass surgery Z95.828    History of cardioversion Z98.890    Critical ischemia of lower extremity I99.8    History of vitamin D deficiency Z86.39    Pseudoaneurysm of femoral artery ([facility]) I72.4    Infection of vascular bypass graft ([facility]) T82. 7XXA    Chronic anemia D64.9    Chronic ulcer of right heel ([facility]) L97.419    Ischemic rest pain of lower extremity ([facility]) I73.9    Prolonged Q-T interval on ECG R94.31    Status post above knee amputation of left lower extremity ([facility] Y16.495    Aftercare for amputation stump Z47.81    Moderate to severe pulmonary hypertension I27.20    Adverse effect of amiodarone T46.2X5A    Skin ulcer of malleolar area of right ankle ([facility]) L97.319     Current Outpatient Prescriptions   Medication Sig Dispense Refill    atorvastatin (LIPITOR) 40 mg tablet Take 1 Tab by mouth daily. 90 Tab 3    zinc sulfate (ZINCATE) 220 (50) mg capsule take 1 tablet by mouth once daily 100 Cap 0    gabapentin (NEURONTIN) 400 mg capsule Take 1 Cap by mouth three (3) times daily. (Patient taking differently: Take 400 mg by mouth four (4) times daily.) 90 Cap 0    furosemide (LASIX) 20 mg tablet 1 tablet daily  Indications: Pulmonary Edema due to Chronic Heart Failure 90 Tab 0    cholecalciferol (VITAMIN D3) 1,000 unit cap Take  by mouth daily.       metoprolol tartrate (LOPRESSOR) 25 mg tablet Take 1 Tab by mouth every twelve (12) hours. Indications: hypertension, VENTRICULAR RATE CONTROL IN ATRIAL FIBRILLATION 180 Tab 3    magnesium oxide (MAG-OX) 400 mg tablet Take 1 Tab by mouth daily. 90 Tab 3    losartan (COZAAR) 50 mg tablet Take 1 Tab by mouth daily. 90 Tab 2    amiodarone (CORDARONE) 200 mg tablet take 1 tablet by mouth once daily INDICATION: VENTRICULAR RATE CONTROL IN ATRIAL FIBRILLATION 90 Tab 3    multivitamin (ONE A DAY) tablet Take 1 Tab by mouth daily.  potassium chloride (KLOR-CON) 20 mEq packet Take 1 Packet by mouth daily. [while on Furosemide]  Indications: hypokalemia prevention 30 Packet 0    clopidogrel (PLAVIX) 75 mg tab Take 75 mg by mouth daily.  aspirin 81 mg chewable tablet Take 1 Tab by mouth daily (with breakfast). 90 Tab 3     Allergies   Allergen Reactions    Morphine Other (comments)     Patient gets confused with morphine. Physical Exam:    Visit Vitals    /74 (BP 1 Location: Left arm, BP Patient Position: Sitting)    Resp 22    Ht 5' 10\" (1.778 m)    Wt 183 lb (83 kg)    BMI 26.26 kg/m2      General: Well-appearing male in no acute distress   HEENT: EOMI, no scleral icterus is noted. Pulmonary: No increased work or breathing is noted. Abdomen: nondistended. Extremities: s/p left AKA, stump without signs of ischemia or infection. Incision is invaginated. He has an extremely tender area on the distal medial portion of stump to palpation. No signs of infection. Right foot warm and well perfused. No edema. Left groin with multiple open areas consistent with tunneling infection. No drainage appreciated on exam. No foul odor. No mass or underlying fluctuance appreciated. No induration. Neuro: Cranial nerves II through XII are grossly intact       Impression and Plan:  Cheo Galvin is a 59 y.o. male with complex vascular history as above. His imaging was reviewed with him and his daughter in the office today. Bypass grafts are all patent with biphasic flow. Unfortunately he is having some significant pain in his left AKA stump and he also has an area in the left groin concerning for possible tunneling infection. Discussed with him that we will obtain a CTA scan to further assess. I will call him with the results of his CTA scan. Discussed that if everything looks okay on his scan we may have him follow-up in 6 months with repeat studies. Further surgical discussion pending his scan results. Discussed that will not clear him to return to work at this time until we have reviewed the results of his CTA scan. We can discuss further at that time. Plan was discussed. Patient expresses understanding and agrees. Johana Cat  [phone]        PLEASE NOTE:  This document has been produced using voice recognition software. Unrecognized errors in transcription may be present.

## 2018-04-04 PROCEDURE — 041K0JJ BYPASS RIGHT FEMORAL ARTERY TO LEFT FEMORAL ARTERY WITH SYNTHETIC SUBSTITUTE, OPEN APPROACH: ICD-10-PCS | Performed by: SURGERY

## 2018-04-04 PROCEDURE — 04CY0ZZ EXTIRPATION OF MATTER FROM LOWER ARTERY, OPEN APPROACH: ICD-10-PCS | Performed by: SURGERY

## 2018-04-04 PROCEDURE — 03150J9 BYPASS RIGHT AXILLARY ARTERY TO RIGHT LOWER LEG ARTERY WITH SYNTHETIC SUBSTITUTE, OPEN APPROACH: ICD-10-PCS | Performed by: SURGERY

## 2018-04-04 PROCEDURE — 04CK0ZZ EXTIRPATION OF MATTER FROM RIGHT FEMORAL ARTERY, OPEN APPROACH: ICD-10-PCS | Performed by: SURGERY

## 2018-04-05 ENCOUNTER — HOSPITAL ENCOUNTER (OUTPATIENT)
Dept: CT IMAGING | Age: 58
Discharge: HOME | End: 2018-04-05
Attending: PHYSICIAN ASSISTANT
Payer: COMMERCIAL

## 2018-04-05 DIAGNOSIS — M79.609 STUMP PAIN (HCC): ICD-10-CM

## 2018-04-05 DIAGNOSIS — Z95.828 STATUS POST FEMORAL-POPLITEAL BYPASS SURGERY: ICD-10-CM

## 2018-04-05 DIAGNOSIS — Z89.612 STATUS POST ABOVE KNEE AMPUTATION OF LEFT LOWER EXTREMITY: ICD-10-CM

## 2018-04-05 DIAGNOSIS — T87.89 STUMP PAIN (HCC): ICD-10-CM

## 2018-04-05 DIAGNOSIS — I74.09 AORTOILIAC OCCLUSIVE DISEASE (HCC): Chronic | ICD-10-CM

## 2018-04-05 DIAGNOSIS — I73.9 PAD (PERIPHERAL ARTERY DISEASE) (HCC): ICD-10-CM

## 2018-04-05 LAB — CREAT UR-MCNC: 1 MG/DL (ref 0.6–1.3)

## 2018-04-05 PROCEDURE — 75635 CT ANGIO ABDOMINAL ARTERIES: CPT

## 2018-04-05 PROCEDURE — 74011636320 HC RX REV CODE- 636/320: Performed by: PHYSICIAN ASSISTANT

## 2018-04-05 PROCEDURE — 82565 ASSAY OF CREATININE: CPT

## 2018-04-05 RX ADMIN — IOPAMIDOL 125 ML: 755 INJECTION, SOLUTION INTRAVENOUS at 11:00

## 2018-04-07 PROCEDURE — B41F1ZZ FLUOROSCOPY OF RIGHT LOWER EXTREMITY ARTERIES USING LOW OSMOLAR CONTRAST: ICD-10-PCS | Performed by: SURGERY

## 2018-04-10 ENCOUNTER — TELEPHONE (OUTPATIENT)
Dept: VASCULAR SURGERY | Age: 58
End: 2018-04-10

## 2018-04-10 PROCEDURE — 041K0JL BYPASS RIGHT FEMORAL ARTERY TO POPLITEAL ARTERY WITH SYNTHETIC SUBSTITUTE, OPEN APPROACH: ICD-10-PCS | Performed by: SURGERY

## 2018-04-10 RX ORDER — LEVOFLOXACIN 500 MG/1
500 TABLET, FILM COATED ORAL DAILY
Qty: 10 TAB | Refills: 0 | Status: SHIPPED | OUTPATIENT
Start: 2018-04-10 | End: 2018-05-22

## 2018-04-10 NOTE — TELEPHONE ENCOUNTER
Called about results of CTA scan. Spoke with patient daughter. Has superficial skin infection of left groin. Will cover with ABX and wound care. ABX sent to pharmacy. Discussed if no improvement or groin worsens will need I&D. Left stump pain much improved with pad for prosthetic. No bone spur seen on CTA. Pt asking to go back to work part time. Scheduled appt to see Dr Fredrick Badillo in AM for further discussion.

## 2018-04-11 ENCOUNTER — OFFICE VISIT (OUTPATIENT)
Dept: VASCULAR SURGERY | Age: 58
End: 2018-04-11

## 2018-04-11 DIAGNOSIS — I65.21 CAROTID OCCLUSION, RIGHT: ICD-10-CM

## 2018-04-11 DIAGNOSIS — I74.09 AORTOILIAC OCCLUSIVE DISEASE (HCC): Primary | Chronic | ICD-10-CM

## 2018-04-11 DIAGNOSIS — I73.9 PERIPHERAL ARTERY DISEASE (HCC): Chronic | ICD-10-CM

## 2018-04-11 DIAGNOSIS — I70.213 ATHEROSCLEROSIS OF NATIVE ARTERY OF BOTH LOWER EXTREMITIES WITH INTERMITTENT CLAUDICATION (HCC): Chronic | ICD-10-CM

## 2018-04-11 PROCEDURE — 30233N0 TRANSFUSION OF AUTOLOGOUS RED BLOOD CELLS INTO PERIPHERAL VEIN, PERCUTANEOUS APPROACH: ICD-10-PCS | Performed by: SURGERY

## 2018-04-11 NOTE — PROGRESS NOTES
Sury Hull    No chief complaint on file. History and Physical    Sury Hull is a 59 y.o. male with extraordinarily complicated vascular history please see previous note by Moreno Martinez on March 29 for a full listing of all of his surgeries but in short he has a right axillary to femoral bypass with a jump to his popliteal artery on the right leg and he currently has a left AKA. Currently the patient seems to be doing quite well without any fevers or chills his previous issue of the wound with some drainage has since resolved and all previous erythema has resolved with some antibiotics. Overall he seems to be doing quite well without any pain or rest pain TIA or strokelike symptoms. Past Medical History:   Diagnosis Date    Acute blood loss as cause of postoperative anemia 4/4/2017    Anticoagulated on Coumadin     Aortoiliac occlusive disease (HCC) 1/25/2017    Atherosclerosis of native artery of both lower extremities with intermittent claudication ([address] 1/25/2017    Benign hypertensive heart disease with systolic congestive heart failure, NYHA class 2 ([address] 1/26/2015    Carotid artery disease (HCC)     Chronic anemia     Chronic systolic heart failure (HCC)     Chronic ulcer of right foot ([address] 4/4/2017    Chronic ulcer of right heel ([address] 8/8/2017    Coronary artery disease involving native coronary artery of native heart 3/15/2017    Successful stenting of Cx (Xience LESLEY) and RCA (Xience LESLEY) to 0% by Dr. Imelda De La Cruz on 3/15/17.     DDD (degenerative disc disease), lumbar 1/26/2015    Dyslipidemia     Erectile dysfunction 7/5/2016    Euthyroid sick syndrome 4/6/2017    Hereditary peripheral neuropathy 11/15/2016    History of cardioversion 4/18/2017    S/P Synchronized external cardioversion (4/18/2017 - Dr. Ariane Curiel)    History of vitamin D deficiency 4/22/2017    Vitamin D 25-Hydroxy (4/22/2017) = 12.0; Vitamin D 25-Hydroxy (5/26/2017) = 38.7    Infection of vascular bypass graft ([facility] 7/24/2017    Left lower extremity    Ischemic cardiomyopathy     Moderate to severe pulmonary hypertension ([facility] 7/23/2017    Paroxysmal atrial fibrillation (HCC)     Peripheral artery disease ([facility] 1/25/2017    Skin ulcer of malleolar area of right ankle ([facility]     Spinal stenosis of lumbar region with radiculopathy 5/4/2015    Dr. Vivek Delgado     Past Surgical History:   Procedure Laterality Date    CARDIAC CATHETERIZATION  3/8/2017         CARDIAC CATHETERIZATION  3/15/2017         CORONARY STENT SINGLE W/PTCA  3/15/2017         HX ABOVE KNEE AMPUTATION Left 08/18/2017    S/P Left above-the-knee amputation (8/18/2017 - Dr. Ar Bello)    HX ATHERECTOMY Left 06/15/2017    S/P Atherectomy and balloon angioplasty of the left superficial femoral artery and above-knee popliteal artery (6/15/2017 - Dr. Ar Bello)    HX ATHERECTOMY Right 09/07/2017    S/P Atherectomy and balloon angioplasty of the below-the-knee popliteal artery, above-the-knee popliteal artery, tibioperoneal trunk artery and posterior tibial artery (9/7/2017 - Dr. Ar Bello)    HX COLONOSCOPY  07/23/2015    polyps repeat 2020 5 years Kelly Henderson 89 Right 04/04/2017    S/P Right axillary to bifemoral artery bypass using an 8 mm Propaten graft from the right axilla to the right common femoral artery with a jump femoral-femoral bypass with another 8 mm Propaten external ring bypass; Right common femoral artery, and profunda femoris artery and superficial femoral artery endarterectomy causing an extensive surgery on the right side (4/4/2017 - Dr. Saeid Diamond)    Ceasar 94 Right 04/07/2017    S/P Cutdown of femoral-femoral bypass, more on the left groin side; Right lower extremity angiography with first-order catheterization (4/7/2017 - Dr. Ar Bello)    HX FEMORAL BYPASS Right 04/10/2017    S/P Right femoral to above-knee popliteal bypass with an 8 mm PTFE graft (4/10/2017 - Dr. Attila Alonso)    HX OTHER SURGICAL Left 07/25/2017    S/P Removal of infected graft; Repair of left superficial femoral artery; Repair of left common femoral artery (7/25/2017 - Dr. Esmer Acosta)    HX OTHER SURGICAL Right 06/27/2017    S/P Drainage of right side abscess (6/27/2017 - Dr. Esmer Acosta)    HX OTHER SURGICAL Left 07/01/2017    S/P Left groin exploration; Left femoral repair pseudoaneurysm; Left wound washout; Wound VAC placement (7/01/2017 - Dr. Esmer Acosta)    HX OTHER SURGICAL Left 07/04/2017    S/P Left common femoral artery ligation; Jump bypass from right to left fem-fem to a more distal superficial femoral artery using 8 mm external ringed Propaten graft; Left groin wound exploration and washout (7/4/2017 - Dr. Esmer Acosta)    HX OTHER SURGICAL Right 08/18/2017    S/P Right axillary femoral jump bypass interposition; Removal of infected right axillary femoral bypass;  Wound VAC placement of upper thigh 1.2 cm x 5.2 cm x 1.8 cm and groin 4 cm x 0.5 cm x 0.2 cm (8/18/2017 - Dr. Esmer Acosta)     Patient Active Problem List   Diagnosis Code    Benign hypertensive heart disease with systolic congestive heart failure, NYHA class 2 ([facility]) I11.0, I50.20    DDD (degenerative disc disease), lumbar M51.36    Erectile dysfunction N52.9    Spinal stenosis of lumbar region with radiculopathy M48.061, M54.16    Hereditary peripheral neuropathy G60.9    Aortoiliac occlusive disease ([address] I74.09    Atherosclerosis of native artery of both lower extremities with intermittent claudication ([address] I70.213    Peripheral artery disease ([address] I73.9    Coronary artery disease involving native coronary artery of native heart I25.10    Paroxysmal atrial fibrillation (HCC) I48.0    Acute blood loss as cause of postoperative anemia D62    Impaired mobility and ADLs Z74.09    Ischemic cardiomyopathy I25.5    Chronic systolic heart failure (HCC) I50.22    Carotid artery disease ([facility]) I77.9    Dyslipidemia E78.5    Euthyroid sick syndrome E07.81    Aftercare following surgery of the circulatory system Z48.812    Status post femoral-popliteal bypass surgery Z95.828    History of cardioversion Z98.890    Critical ischemia of lower extremity I99.8    History of vitamin D deficiency Z86.39    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 7XXA    Chronic anemia D64.9    Chronic ulcer of right heel ([facility]) L97.419    Ischemic rest pain of lower extremity ([facility]) I73.9    Prolonged Q-T interval on ECG R94.31    Status post above knee amputation of left lower extremity ([facility] P48.661    Aftercare for amputation stump Z47.81    Moderate to severe pulmonary hypertension ([facility]) I27.20    Adverse effect of amiodarone T46.2X5A    Skin ulcer of malleolar area of right ankle ([facility]) L97.319     Current Outpatient Prescriptions   Medication Sig Dispense Refill    levoFLOXacin (LEVAQUIN) 500 mg tablet Take 1 Tab by mouth daily. 10 Tab 0    gabapentin (NEURONTIN) 400 mg capsule take 1 capsule by mouth three times a day 270 Cap 0    atorvastatin (LIPITOR) 40 mg tablet Take 1 Tab by mouth daily. 90 Tab 3    zinc sulfate (ZINCATE) 220 (50) mg capsule take 1 tablet by mouth once daily 100 Cap 0    furosemide (LASIX) 20 mg tablet 1 tablet daily  Indications: Pulmonary Edema due to Chronic Heart Failure 90 Tab 0    cholecalciferol (VITAMIN D3) 1,000 unit cap Take  by mouth daily.  metoprolol tartrate (LOPRESSOR) 25 mg tablet Take 1 Tab by mouth every twelve (12) hours. Indications: hypertension, VENTRICULAR RATE CONTROL IN ATRIAL FIBRILLATION 180 Tab 3    magnesium oxide (MAG-OX) 400 mg tablet Take 1 Tab by mouth daily. 90 Tab 3    losartan (COZAAR) 50 mg tablet Take 1 Tab by mouth daily.  90 Tab 2    amiodarone (CORDARONE) 200 mg tablet take 1 tablet by mouth once daily INDICATION: VENTRICULAR RATE CONTROL IN ATRIAL FIBRILLATION 90 Tab 3    multivitamin (ONE A DAY) tablet Take 1 Tab by mouth daily.  potassium chloride (KLOR-CON) 20 mEq packet Take 1 Packet by mouth daily. [while on Furosemide]  Indications: hypokalemia prevention 30 Packet 0    clopidogrel (PLAVIX) 75 mg tab Take 75 mg by mouth daily.  aspirin 81 mg chewable tablet Take 1 Tab by mouth daily (with breakfast). 90 Tab 3     Allergies   Allergen Reactions    Morphine Other (comments)     Patient gets confused with morphine. Social History     Social History    Marital status: LEGALLY      Spouse name: N/A    Number of children: N/A    Years of education: N/A     Occupational History    Not on file. Social History Main Topics    Smoking status: Former Smoker    Smokeless tobacco: Never Used      Comment: quit in 2011    Alcohol use 1.2 oz/week     2 Cans of beer per week      Comment: 10 cans of beer a month    Drug use: Yes     Special: Marijuana      Comment: occasionally-last used 4/17    Sexual activity: Yes     Partners: Female     Other Topics Concern    Not on file     Social History Narrative      Family History   Problem Relation Age of Onset    Heart Disease Father        Physical Exam:    There were no vitals taken for this visit. General: Well-appearing male in no acute distress  HEENT: EOMI no scleral icterus is noted  Pulmonary: No increased work of breathing is noted  Extremities: Right lower extremity is warm and perfused left AKA stump is healed  Neuro: Cranial nerves II through XII are grossly intact    Impression and Plan:  Mian Rodriguez is a 59 y.o. male with extensive vascular surgical history I think is perfectly reasonable for him to go back to work based on how well he does will depend on how much he can do.   We will continue to follow him for another year with six-month ultrasound follow-up to be remain quite aggressive with his carotid as well as lower extremity disease but after that more than likely will go to yearly follow-ups after another year of six-month follow-ups. We reviewed the plan with the patient and the patient understands. We also gave the patient appropriate instructions on their disease process and when to call back. Follow-up Disposition:  Return in about 6 months (around 10/11/2018). Pj Tse MD    PLEASE NOTE:  This document has been produced using voice recognition software. Unrecognized errors in transcription may be present.

## 2018-05-08 ENCOUNTER — OFFICE VISIT (OUTPATIENT)
Dept: VASCULAR SURGERY | Age: 58
End: 2018-05-08

## 2018-05-08 VITALS
BODY MASS INDEX: 26.2 KG/M2 | WEIGHT: 183 LBS | HEART RATE: 80 BPM | RESPIRATION RATE: 18 BRPM | SYSTOLIC BLOOD PRESSURE: 120 MMHG | HEIGHT: 70 IN | DIASTOLIC BLOOD PRESSURE: 74 MMHG

## 2018-05-08 DIAGNOSIS — Z74.09 IMPAIRED MOBILITY AND ADLS: Primary | ICD-10-CM

## 2018-05-08 DIAGNOSIS — Z78.9 IMPAIRED MOBILITY AND ADLS: Primary | ICD-10-CM

## 2018-05-08 DIAGNOSIS — B37.89 CANDIDA RASH OF GROIN: ICD-10-CM

## 2018-05-08 DIAGNOSIS — Z89.612 STATUS POST ABOVE KNEE AMPUTATION OF LEFT LOWER EXTREMITY: ICD-10-CM

## 2018-05-08 RX ORDER — GABAPENTIN 400 MG/1
400 CAPSULE ORAL 3 TIMES DAILY
Qty: 120 CAP | Refills: 3 | Status: SHIPPED | OUTPATIENT
Start: 2018-05-08 | End: 2018-05-21 | Stop reason: SDUPTHER

## 2018-05-08 RX ORDER — NYSTATIN 100000 U/G
OINTMENT TOPICAL 2 TIMES DAILY
Qty: 15 G | Refills: 0 | Status: SHIPPED | OUTPATIENT
Start: 2018-05-08 | End: 2018-09-20

## 2018-05-08 NOTE — PROGRESS NOTES
Mariaelena Ortez    Chief Complaint   Patient presents with    Leg Pain       History and Physical    Most pleasant 76-year-old male here in follow-up today. He has his history of axillary for femoropopliteal bypass to the left above-knee amputation. He is concerned about an area in his left groin that have been some discharge and some pain. He started an oral antibiotic for fear of infection of prosthesis here with Dr. Rene Jane today. He is to return to work tells me he is doing well. He has been taking his gabapentin 404 times a day tells me this gives him good relief      Past Medical History:   Diagnosis Date    Acute blood loss as cause of postoperative anemia 4/4/2017    Anticoagulated on Coumadin     Aortoiliac occlusive disease (HCC) 1/25/2017    Atherosclerosis of native artery of both lower extremities with intermittent claudication ([address] 1/25/2017    Benign hypertensive heart disease with systolic congestive heart failure, NYHA class 2 ([address] 1/26/2015    Carotid artery disease (HCC)     Chronic anemia     Chronic systolic heart failure (HCC)     Chronic ulcer of right foot ([address] 4/4/2017    Chronic ulcer of right heel ([address] 8/8/2017    Coronary artery disease involving native coronary artery of native heart 3/15/2017    Successful stenting of Cx (Xience LESLEY) and RCA (Xience LESLEY) to 0% by Dr. Polly Hector on 3/15/17.     DDD (degenerative disc disease), lumbar 1/26/2015    Dyslipidemia     Erectile dysfunction 7/5/2016    Euthyroid sick syndrome 4/6/2017    Hereditary peripheral neuropathy 11/15/2016    History of cardioversion 4/18/2017    S/P Synchronized external cardioversion (4/18/2017 - Dr. Charlotte Chew)    History of vitamin D deficiency 4/22/2017    Vitamin D 25-Hydroxy (4/22/2017) = 12.0; Vitamin D 25-Hydroxy (5/26/2017) = 38.7    Infection of vascular bypass graft ([address] 7/24/2017    Left lower extremity    Ischemic cardiomyopathy     Moderate to severe pulmonary hypertension ([facility] 7/23/2017    Paroxysmal atrial fibrillation ([facility]     Peripheral artery disease ([facility] 1/25/2017    Skin ulcer of malleolar area of right ankle ([facility]     Spinal stenosis of lumbar region with radiculopathy 5/4/2015    Dr. Keenan Padilla     Patient Active Problem List   Diagnosis Code    Benign hypertensive heart disease with systolic congestive heart failure, NYHA class 2 ([facility]) I11.0, I50.20    DDD (degenerative disc disease), lumbar M51.36    Erectile dysfunction N52.9    Spinal stenosis of lumbar region with radiculopathy M48.061, M54.16    Hereditary peripheral neuropathy G60.9    Aortoiliac occlusive disease ([facility] I74.09    Atherosclerosis of native artery of both lower extremities with intermittent claudication ([facility] I70.213    Peripheral artery disease ([facility] I73.9    Coronary artery disease involving native coronary artery of native heart I25.10    Paroxysmal atrial fibrillation ([facility]) I48.0    Acute blood loss as cause of postoperative anemia D62    Impaired mobility and ADLs Z74.09    Ischemic cardiomyopathy I25.5    Chronic systolic heart failure ([facility]) I50.22    Carotid artery disease ([facility]) I77.9    Dyslipidemia E78.5    Euthyroid sick syndrome E07.81    Aftercare following surgery of the circulatory system Z48.812    Status post femoral-popliteal bypass surgery Z95.828    History of cardioversion Z98.890    Critical ischemia of lower extremity I99.8    History of vitamin D deficiency Z86.39    Pseudoaneurysm of femoral artery ([facility]) I72.4    Infection of vascular bypass graft ([facility] T82. 7XXA    Chronic anemia D64.9    Chronic ulcer of right heel ([facility]) L97.419    Ischemic rest pain of lower extremity ([facility]) I73.9    Prolonged Q-T interval on ECG R94.31    Status post above knee amputation of left lower extremity ([facility] S96.796    Aftercare for amputation stump Z47.81    Moderate to severe pulmonary hypertension ([facility]) I27.20    Adverse effect of amiodarone T46.2X5A    Skin ulcer of malleolar area of right ankle [facility]) L97.319     Past Surgical History:   Procedure Laterality Date    CARDIAC CATHETERIZATION  3/8/2017         CARDIAC CATHETERIZATION  3/15/2017         CORONARY STENT SINGLE W/PTCA  3/15/2017         HX ABOVE KNEE AMPUTATION Left 08/18/2017    S/P Left above-the-knee amputation (8/18/2017 - Dr. Lisa Orozco)    HX ATHERECTOMY Left 06/15/2017    S/P Atherectomy and balloon angioplasty of the left superficial femoral artery and above-knee popliteal artery (6/15/2017 - Dr. Lisa Orozco)    HX ATHERECTOMY Right 09/07/2017    S/P Atherectomy and balloon angioplasty of the below-the-knee popliteal artery, above-the-knee popliteal artery, tibioperoneal trunk artery and posterior tibial artery (9/7/2017 - Dr. Lisa Orozco)    HX COLONOSCOPY  07/23/2015    polyps repeat 2020 5 years Moshe Henderson 94 Right 04/04/2017    S/P Right axillary to bifemoral artery bypass using an 8 mm Propaten graft from the right axilla to the right common femoral artery with a jump femoral-femoral bypass with another 8 mm Propaten external ring bypass; Right common femoral artery, and profunda femoris artery and superficial femoral artery endarterectomy causing an extensive surgery on the right side (4/4/2017 - Dr. Suzanna Golden)    [address]asar 94 Right 04/07/2017    S/P Cutdown of femoral-femoral bypass, more on the left groin side; Right lower extremity angiography with first-order catheterization (4/7/2017 - Dr. Lisa Orozco)    HX FEMORAL BYPASS Right 04/10/2017    S/P Right femoral to above-knee popliteal bypass with an 8 mm PTFE graft (4/10/2017 - Dr. Dyllan Hanson)    HX OTHER SURGICAL Left 07/25/2017    S/P Removal of infected graft; Repair of left superficial femoral artery; Repair of left common femoral artery (7/25/2017 - Dr. Lisa Orozco)    HX OTHER SURGICAL Right 06/27/2017    S/P Drainage of right side abscess (6/27/2017 - Dr. Garrett Pompa)    HX OTHER SURGICAL Left 07/01/2017    S/P Left groin exploration; Left femoral repair pseudoaneurysm; Left wound washout; Wound VAC placement (7/01/2017 - Dr. Garrett Pompa)    HX OTHER SURGICAL Left 07/04/2017    S/P Left common femoral artery ligation; Jump bypass from right to left fem-fem to a more distal superficial femoral artery using 8 mm external ringed Propaten graft; Left groin wound exploration and washout (7/4/2017 - Dr. Garrett Pompa)    HX OTHER SURGICAL Right 08/18/2017    S/P Right axillary femoral jump bypass interposition; Removal of infected right axillary femoral bypass; Wound VAC placement of upper thigh 1.2 cm x 5.2 cm x 1.8 cm and groin 4 cm x 0.5 cm x 0.2 cm (8/18/2017 - Dr. Garrett Pompa)     Current Outpatient Prescriptions   Medication Sig Dispense Refill    levoFLOXacin (LEVAQUIN) 500 mg tablet Take 1 Tab by mouth daily. 10 Tab 0    gabapentin (NEURONTIN) 400 mg capsule take 1 capsule by mouth three times a day 270 Cap 0    atorvastatin (LIPITOR) 40 mg tablet Take 1 Tab by mouth daily. 90 Tab 3    zinc sulfate (ZINCATE) 220 (50) mg capsule take 1 tablet by mouth once daily 100 Cap 0    furosemide (LASIX) 20 mg tablet 1 tablet daily  Indications: Pulmonary Edema due to Chronic Heart Failure 90 Tab 0    cholecalciferol (VITAMIN D3) 1,000 unit cap Take  by mouth daily.  metoprolol tartrate (LOPRESSOR) 25 mg tablet Take 1 Tab by mouth every twelve (12) hours. Indications: hypertension, VENTRICULAR RATE CONTROL IN ATRIAL FIBRILLATION 180 Tab 3    magnesium oxide (MAG-OX) 400 mg tablet Take 1 Tab by mouth daily. 90 Tab 3    losartan (COZAAR) 50 mg tablet Take 1 Tab by mouth daily. 90 Tab 2    amiodarone (CORDARONE) 200 mg tablet take 1 tablet by mouth once daily INDICATION: VENTRICULAR RATE CONTROL IN ATRIAL FIBRILLATION 90 Tab 3    multivitamin (ONE A DAY) tablet Take 1 Tab by mouth daily.       potassium chloride (KLOR-CON) 20 mEq packet Take 1 Packet by mouth daily. [while on Furosemide]  Indications: hypokalemia prevention 30 Packet 0    clopidogrel (PLAVIX) 75 mg tab Take 75 mg by mouth daily.  aspirin 81 mg chewable tablet Take 1 Tab by mouth daily (with breakfast). 90 Tab 3     Allergies   Allergen Reactions    Morphine Other (comments)     Patient gets confused with morphine. Review of Systems    A full review of systems was completed times ten organ systems and was deemed negative unless otherwise mentioned in the HPI. Physical   Visit Vitals    /74 (BP 1 Location: Left arm, BP Patient Position: Sitting)    Pulse 80    Resp 18    Ht 5' 10\" (1.778 m)    Wt 183 lb (83 kg)    BMI 26.26 kg/m2       Mr. Gen Caldera is in good spirits he appears well today is return to work  Head is normocephalic  Neck no JVD  Chest clear  Cardiac regular  Abdomen soft nontender  Right lower extremity appears well no ulceration no signs of acute ischemia  Left lower extremity wearing his prosthesis today  Groins on his left lower groin down in the scrotal area there is an appearance of heat rash I do not see any open wounds is no blood  He rash likely a Candida infection      Impression/Plan:     ICD-10-CM ICD-9-CM    1. Impaired mobility and ADLs Z74.09 799.89    2. Status post above knee amputation of left lower extremity ([facility] Z89.612 V49.76    3. Candida rash of groin B37.89 112.89      We talked about using a powder to keep the groins dry  Lotrimin for Candida infection, expect this to resolve  will communicate with his processes for new prescription for his left Above-knee amputation socket  Also for possible AFO for right ankle   He will continue this gabapentin at this therapeutic dose  He will keep his regular scheduled appointment  No orders of the defined types were placed in this encounter.       Follow-up Disposition: Not on 2020 Landon Wilson MD    PLEASE NOTE:  This document has been produced using voice recognition software. Unrecognized errors in transcription may be present.

## 2018-05-08 NOTE — PROGRESS NOTES
1. Have you been to an emergency room or urgent care clinic since your last visit? No    Hospitalized since your last visit? If yes, where, when, and reason for visit? No  2. Have you seen or consulted any other health care providers outside of the [facility] since your last visit including any procedures, health maintenance items. If yes, where, when and reason for visit?  No

## 2018-05-21 ENCOUNTER — TELEPHONE (OUTPATIENT)
Dept: VASCULAR SURGERY | Age: 58
End: 2018-05-21

## 2018-05-21 DIAGNOSIS — Z89.612 STATUS POST ABOVE KNEE AMPUTATION OF LEFT LOWER EXTREMITY: ICD-10-CM

## 2018-05-21 RX ORDER — CLOPIDOGREL BISULFATE 75 MG/1
75 TABLET ORAL DAILY
Qty: 30 TAB | Refills: 6 | Status: SHIPPED | OUTPATIENT
Start: 2018-05-21 | End: 2018-09-24 | Stop reason: SDUPTHER

## 2018-05-21 RX ORDER — GABAPENTIN 400 MG/1
CAPSULE ORAL
Qty: 120 CAP | Refills: 3 | Status: SHIPPED | OUTPATIENT
Start: 2018-05-21 | End: 2018-06-19 | Stop reason: SDUPTHER

## 2018-05-21 NOTE — TELEPHONE ENCOUNTER
Refill for Gabapentin 400 mg capsule, take two capsules in the morning and one capsule in the afternoon and the evening, quantity 120 capsules, 3 refills to [facility] per verbal order Dr. Jazmine Rodriguez.

## 2018-05-21 NOTE — TELEPHONE ENCOUNTER
Charles Dorman, daughter called to follow up if the order for the socket for his prosthetics was ordered. Also, wanted to follow up if the brace for his right leg was also ordered. She also wanted to check about his medication, Gabapentin, she was advised on his last visit, May 8 to this prescription was to be taken 4x daily but the instruction stated 3x daily. She would like a return call about this prescription.

## 2018-05-22 ENCOUNTER — OFFICE VISIT (OUTPATIENT)
Dept: FAMILY MEDICINE CLINIC | Age: 58
End: 2018-05-22

## 2018-05-22 VITALS
DIASTOLIC BLOOD PRESSURE: 80 MMHG | WEIGHT: 183 LBS | SYSTOLIC BLOOD PRESSURE: 120 MMHG | TEMPERATURE: 98.2 F | BODY MASS INDEX: 26.2 KG/M2 | RESPIRATION RATE: 16 BRPM | OXYGEN SATURATION: 97 % | HEART RATE: 74 BPM | HEIGHT: 70 IN

## 2018-05-22 DIAGNOSIS — L08.9 INFECTED SEBACEOUS CYST: Primary | ICD-10-CM

## 2018-05-22 DIAGNOSIS — L72.3 INFECTED SEBACEOUS CYST: Primary | ICD-10-CM

## 2018-05-22 RX ORDER — CEPHALEXIN 500 MG/1
500 CAPSULE ORAL 3 TIMES DAILY
Qty: 30 CAP | Refills: 0 | Status: SHIPPED | OUTPATIENT
Start: 2018-05-22 | End: 2018-06-19

## 2018-05-22 NOTE — MR AVS SNAPSHOT
[address] [facility] [address] [facility] 
[phone] Patient: John Mondragon MRN: [id number] KKS:[id number] Visit Information Date & Time Provider Department Dept. Phone Encounter #  
 5/22/2018  3:45 PM [place], [address] [place] [address] [id number] Your Appointments 6/19/2018  9:15 AM  
FOLLOW UP EXAM with Maxwell Vargas MD  
[address] [place] [address] ([address] [place] [address]) Appt Note: 4 mth, f/u  
 [address] [facility] [address] [facility] [address] [facility]  
  
    
 9/4/2018 10:20 AM  
Follow Up with Stewart Olivares DO [facility] ([address] [place] [address]) Appt Note: 6 month follow up Priyanka Vazquez [id number]  
[phone] Azul Hudson [address]  
  
    
 10/4/2018 12:45 PM  
PROCEDURE with [facility] IMAGING 2 [facility] ([address] [place] [address]) Appt Note: BPG 6 MOS/EDGAR  
 [address] [place], [place] [address] [address] [facility]  
[phone] [address] [facility],[address] [facility]  
  
    
 10/4/2018  1:45 PM  
PROCEDURE with [facility] IMAGING 2 [facility] ([address] [place] [address]) Appt Note: CV 6 MOS/EDGAR  
 [address] [place], [place] [address] [address] [facility]  
[phone]  
  
    
 10/4/2018  2:45 PM  
PROCEDURE with [facility] NONIMAGING [facility] ([address] [place] [address]) Appt Note: MARINA 6 MOS/EDGAR  
 [address] [place], [place] [address] [address] [facility]  
[phone] [address] [place] [address]  
  
    
 10/10/2018 10:15 AM  
Follow Up with Zohaib Mar MD  
[address] [place] [address] and [facility] [address] [place] [address]) Appt Note: 6 MONTHS FOLLOW UP AFTER STUDIES  
 [address] [facility] [place] 7[address] 200 [facility]  
[phone] 23[address] [facility] [place] 200 [facility] Upcoming Health Maintenance Date Due ZOSTER VACCINE AGE 60> 1/22/2018 Influenza Age 5 to Adult 8/1/2018 COLONOSCOPY 7/23/2020 DTaP/Tdap/Td series (2 - Td) 7/5/2026 Allergies as of 5/22/2018  Review Complete On: 5/8/2018 By: Romel Jensen MD  
  
 Severity Noted Reaction Type Reactions Morphine  08/03/2017   Side Effect Other (comments) Patient gets confused with morphine. Current Immunizations  Reviewed on 11/20/2017 Name Date Influenza Vaccine (Quad) PF 11/20/2017 Tdap 7/5/2016 Not reviewed this visit You Were Diagnosed With   
  
 Codes Comments Infected sebaceous cyst    -  Primary ICD-10-CM: L72.3, L08.9 ICD-9-CM: 706. 2 Vitals BP Pulse Temp Resp Height(growth percentile) Weight(growth percentile) 120/80 (BP 1 Location: Left arm, BP Patient Position: Sitting) 74 98.2 °F (36.8 °C) (Oral) 16 5' 10\" (1.778 m) 183 lb (83 kg) SpO2 BMI Smoking Status 97% 26.26 kg/m2 Former Smoker Vitals History BMI and BSA Data Body Mass Index Body Surface Area  
 26.26 kg/m 2 2.02 m 2 Preferred Pharmacy Pharmacy Name Phone [facility] [address] Your Updated Medication List  
  
   
This list is accurate as of 5/22/18  4:28 PM.  Always use your most recent med list.  
  
  
  
  
 amiodarone 200 mg tablet Commonly known as:  CORDARONE  
take 1 tablet by mouth once daily INDICATION: VENTRICULAR RATE CONTROL IN ATRIAL FIBRILLATION  
  
 aspirin 81 mg chewable tablet Take 1 Tab by mouth daily (with breakfast). atorvastatin 40 mg tablet Commonly known as:  LIPITOR Take 1 Tab by mouth daily. cephALEXin 500 mg capsule Commonly known as:  Murillo Awkward Take 1 Cap by mouth three (3) times daily. clopidogrel 75 mg Tab Commonly known as:  PLAVIX Take 1 Tab by mouth daily. furosemide 20 mg tablet Commonly known as:  LASIX  
1 tablet daily  Indications: Pulmonary Edema due to Chronic Heart Failure * gabapentin 400 mg capsule Commonly known as:  NEURONTIN  
take 1 capsule by mouth three times a day * gabapentin 400 mg capsule Commonly known as:  NEURONTIN Take two capsules in the morning and take one capsule in the afternoon and evening. losartan 50 mg tablet Commonly known as:  COZAAR Take 1 Tab by mouth daily. magnesium oxide 400 mg tablet Commonly known as:  MAG-OX Take 1 Tab by mouth daily. metoprolol tartrate 25 mg tablet Commonly known as:  LOPRESSOR Take 1 Tab by mouth every twelve (12) hours. Indications: hypertension, VENTRICULAR RATE CONTROL IN ATRIAL FIBRILLATION  
  
 multivitamin tablet Commonly known as:  ONE A DAY Take 1 Tab by mouth daily. nystatin 100,000 unit/gram ointment Commonly known as:  MYCOSTATIN Apply  to affected area two (2) times a day. potassium chloride 20 mEq packet Commonly known as:  KLOR-CON Take 1 Packet by mouth daily. [while on Furosemide]  Indications: hypokalemia prevention VITAMIN D3 1,000 unit Cap Generic drug:  cholecalciferol Take  by mouth daily. zinc sulfate 220 (50) mg capsule Commonly known as:  ZINCATE  
take 1 tablet by mouth once daily * Notice: This list has 2 medication(s) that are the same as other medications prescribed for you. Read the directions carefully, and ask your doctor or other care provider to review them with you. Prescriptions Sent to Pharmacy Refills  
 cephALEXin (KEFLEX) 500 mg capsule 0 Sig: Take 1 Cap by mouth three (3) times daily. Class: Normal  
 Pharmacy: [address] [place] [address] #: [phone] Route: Oral  
  
Patient Instructions Infected Epidermoid Cyst: Care Instructions Your Care Instructions An epidermoid (say \"vd-wsr-BAG-jessie\") cyst is a lump just under the skin. These cysts can form when a hair follicle becomes blocked. They are common in acne and may occur on the face, neck, back, and genitals. However, they can form anywhere on the body. These cysts are not cancer and do not lead to cancer. They tend not to hurt, but they can sometimes become swollen and painful. They also may break open (rupture) and cause scarring. These cysts sometimes do not cause problems and may not need treatment. If you have a cyst that is swollen and hurts, your doctor may inject it with a medicine to help it heal. But it is more likely that a painful cyst will need to be removed. Your doctor will give you a shot of numbing medicine and cut into the cyst to drain it or remove it. This makes the symptoms go away. Follow-up care is a key part of your treatment and safety. Be sure to make and go to all appointments, and call your doctor if you are having problems. It's also a good idea to know your test results and keep a list of the medicines you take. How can you care for yourself at home? · Do not squeeze the cyst or poke it with a needle to open it. This can cause swelling, redness, and infection. · Always have a doctor look at any new lumps you get to make sure that they are not serious. When should you call for help? Watch closely for changes in your health, and be sure to contact your doctor if: 
? · You have a fever, redness, or swelling after you get a shot of medicine in the cyst.  
? · You see or feel a new lump on your skin. Where can you learn more? Go to http://yuniel-lyndsay.info/. Enter G653 in the search box to learn more about \"Epidermoid Cyst: Care Instructions. \" Current as of: October 13, 2016 Content Version: 11.4 © 5373-8310 [employer]Alleg[employer].  Care instructions adapted under license by [address] (which disclaims liability or warranty for this information). If you have questions about a medical condition or this instruction, always ask your healthcare professional. Wagner Tafoya any warranty or liability for your use of this information. [employer] [facility] [address] [place] [address] 405 [facility] [address] 
[phone] Appointment: 5/25/18 @ 2:00 pm check in 1:45 pm check in to see Dr. Shawn Coulter Introducing [facility]! [employer] introduces [employer] patient portal. Now you can access parts of your medical record, email your doctor's office, and request medication refills online. 1. In your internet browser, go to [email or web address] 2. Click on the First Time User? Click Here link in the Sign In box. You will see the New Member Sign Up page. 3. Enter your [employer] Access Code exactly as it appears below. You will not need to use this code after youve completed the sign-up process. If you do not sign up before the expiration date, you must request a new code. · [employer] Access Code: [email or web address] Expires: 6/27/2018  4:06 PM 
 
4. Enter the last four digits of your Social Security Number (xxxx) and Date of Birth (mm/dd/yyyy) as indicated and click Submit. You will be taken to the next sign-up page. 5. Create a [employer] ID. This will be your [employer] login ID and cannot be changed, so think of one that is secure and easy to remember. 6. Create a [employer] password. You can change your password at any time. 7. Enter your Password Reset Question and Answer. This can be used at a later time if you forget your password. 8. Enter your e-mail address. You will receive e-mail notification when new information is available in [address] 9. Click Sign Up. You can now view and download portions of your medical record.  
10. Click the Download Summary menu link to download a portable copy of your medical information. If you have questions, please visit the Frequently Asked Questions section of the [employer] website. Remember, [employer] is NOT to be used for urgent needs. For medical emergencies, dial 911. Now available from your iPhone and Android! Please provide this summary of care documentation to your next provider. Your primary care clinician is listed as Olga Lidia Olson. If you have any questions after today's visit, please call [phone].

## 2018-05-22 NOTE — PROGRESS NOTES
Chief Complaint   Patient presents with    Mass     on back x 3 days; cyst on mid back; + redness       1. Have you been to the ER, urgent care clinic since your last visit? Hospitalized since your last visit? No    2. Have you seen or consulted any other health care providers outside of the [address] [place] [address] since your last visit? Include any pap smears or colon screening.  No

## 2018-05-22 NOTE — PATIENT INSTRUCTIONS
Infected Epidermoid Cyst: Care Instructions  Your Care Instructions  An epidermoid (say \"av-imr-ZLX-jessie\") cyst is a lump just under the skin. These cysts can form when a hair follicle becomes blocked. They are common in acne and may occur on the face, neck, back, and genitals. However, they can form anywhere on the body. These cysts are not cancer and do not lead to cancer. They tend not to hurt, but they can sometimes become swollen and painful. They also may break open (rupture) and cause scarring. These cysts sometimes do not cause problems and may not need treatment. If you have a cyst that is swollen and hurts, your doctor may inject it with a medicine to help it heal. But it is more likely that a painful cyst will need to be removed. Your doctor will give you a shot of numbing medicine and cut into the cyst to drain it or remove it. This makes the symptoms go away. Follow-up care is a key part of your treatment and safety. Be sure to make and go to all appointments, and call your doctor if you are having problems. It's also a good idea to know your test results and keep a list of the medicines you take. How can you care for yourself at home? · Do not squeeze the cyst or poke it with a needle to open it. This can cause swelling, redness, and infection. · Always have a doctor look at any new lumps you get to make sure that they are not serious. When should you call for help? Watch closely for changes in your health, and be sure to contact your doctor if:  ? · You have a fever, redness, or swelling after you get a shot of medicine in the cyst.   ? · You see or feel a new lump on your skin. Where can you learn more? Go to http://yuniel-lyndsay.info/. Enter C709 in the search box to learn more about \"Epidermoid Cyst: Care Instructions. \"  Current as of: October 13, 2016  Content Version: 11.4  © 1311-8637 im3D.  Care instructions adapted under license by [employer] (which disclaims liability or warranty for this information). If you have questions about a medical condition or this instruction, always ask your healthcare professional. [place] [address] any warranty or liability for your use of this information.     [employer] [facility]  [address] [place] [phone]  Appointment: 5/25/18 @ 2:00 pm check in 1:45 pm check in to see Dr. Tiara John

## 2018-05-22 NOTE — PROGRESS NOTES
SUBJECTIVE  Chief Complaint   Patient presents with    Mass     on back x 3 days; cyst on mid back; + redness     Patient presents complaining of a painful lump on his back for 3 days. The patient denies any prior history of similar rash. The patient denies any fevers. He has not reported any drainage. He is on Plavix. OBJECTIVE    Blood pressure 120/80, pulse 74, temperature 98.2 °F (36.8 °C), temperature source Oral, resp. rate 16, height 5' 10\" (1.778 m), weight 183 lb (83 kg), SpO2 97 %. General:  alert, cooperative, well appearing, in no apparent distress. Skin:  On the midline of his mid-thoracic back there is a quarter-sized erythematous lump with a small punctum. There is a 1cm rim of surrounding induration. The skin is thick and like an orange peel. There is no active drainage. ASSESSMENT / PLAN    ICD-10-CM ICD-9-CM    1. Infected sebaceous cyst L72.3 706.2     L08.9       Start warm compresses. Start keflex 500mg po tid for 10 days. Refer to general surgery for possible excision or I&D depending on how it looks at that time. 15 minutes of face to face time spent with the patient with at least 50% on counseling on above medical issues. All chart history elements were reviewed by me at the time of the visit even though marked at time of note closure. Patient understands our medical plan. Patient has provided input and agrees with goals. Alternatives have been explained and offered. All questions answered. The patient is to call if condition worsens or fails to improve. Follow-up with PCP.

## 2018-05-30 ENCOUNTER — OFFICE VISIT (OUTPATIENT)
Dept: SURGERY | Age: 58
End: 2018-05-30

## 2018-05-30 VITALS
SYSTOLIC BLOOD PRESSURE: 139 MMHG | DIASTOLIC BLOOD PRESSURE: 48 MMHG | BODY MASS INDEX: 26.2 KG/M2 | HEIGHT: 70 IN | WEIGHT: 183 LBS | TEMPERATURE: 97.4 F | RESPIRATION RATE: 20 BRPM | HEART RATE: 69 BPM

## 2018-05-30 DIAGNOSIS — R22.2 MASS ON BACK: Primary | ICD-10-CM

## 2018-05-30 NOTE — PROGRESS NOTES
General Surgery Consult    Maxi Goel  Admit date: (Not on file)    MRN: [id number]     : 1960     Age: 59 y.o. Attending Physician: Yumiko Reeves MD [facility]      History of Present Illness:      Maxi Goel is a 59 y.o. male who presented with an infected back sebaceous cyst. The patient had the cyst for years but it did get infected about one week ago. He was started on p.o. Antibiotics. Yesterday the cyst started draining significantly and he is feeling better today. He currently denies any fever or chills. He is on Keflex p.o. Antibiotics.      Patient Active Problem List    Diagnosis Date Noted    Skin ulcer of malleolar area of right ankle ([address]     Prolonged Q-T interval on ECG 2017    Adverse effect of amiodarone 2017    Status post above knee amputation of left lower extremity ([address] 2017    Aftercare for amputation stump 2017    Ischemic rest pain of lower extremity ([address] 2017    Chronic ulcer of right heel ([address] 2017    Chronic anemia     Acute blood loss as cause of postoperative anemia 2017    Aftercare following surgery of the circulatory system 2017    Impaired mobility and ADLs 2017    Infection of vascular bypass graft ([address] 2017    Moderate to severe pulmonary hypertension ([address] 2017    Pseudoaneurysm of femoral artery ([address] 2017    History of vitamin D deficiency 2017    Status post femoral-popliteal bypass surgery 2017    Ischemic cardiomyopathy     Chronic systolic heart failure (HCC)     Carotid artery disease ([address]     Dyslipidemia     History of cardioversion 2017    Paroxysmal atrial fibrillation ([address]     Critical ischemia of lower extremity 04/10/2017    Euthyroid sick syndrome 2017    Coronary artery disease involving native coronary artery of native heart 03/15/2017    Aortoiliac occlusive disease ([address] 2017    Atherosclerosis of native artery of both lower extremities with intermittent claudication ([address] 01/25/2017    Peripheral artery disease ([address] 01/25/2017    Hereditary peripheral neuropathy 11/15/2016    Erectile dysfunction 07/05/2016    Spinal stenosis of lumbar region with radiculopathy 05/04/2015    Benign hypertensive heart disease with systolic congestive heart failure, NYHA class 2 ([address] 01/26/2015    DDD (degenerative disc disease), lumbar 01/26/2015     Past Medical History:   Diagnosis Date    Acute blood loss as cause of postoperative anemia 4/4/2017    Anticoagulated on Coumadin     Aortoiliac occlusive disease ([address] 1/25/2017    Atherosclerosis of native artery of both lower extremities with intermittent claudication ([address] 1/25/2017    Benign hypertensive heart disease with systolic congestive heart failure, NYHA class 2 ([address] 1/26/2015    Carotid artery disease (HCC)     Chronic anemia     Chronic systolic heart failure (HCC)     Chronic ulcer of right foot ([address] 4/4/2017    Chronic ulcer of right heel ([address] 8/8/2017    Coronary artery disease involving native coronary artery of native heart 3/15/2017    Successful stenting of Cx (Xience LESLEY) and RCA (Xience LESLEY) to 0% by Dr. Marco Philip on 3/15/17.     DDD (degenerative disc disease), lumbar 1/26/2015    Dyslipidemia     Erectile dysfunction 7/5/2016    Euthyroid sick syndrome 4/6/2017    Hereditary peripheral neuropathy 11/15/2016    History of cardioversion 4/18/2017    S/P Synchronized external cardioversion (4/18/2017 - Dr. Diamond Mejia)    History of vitamin D deficiency 4/22/2017    Vitamin D 25-Hydroxy (4/22/2017) = 12.0; Vitamin D 25-Hydroxy (5/26/2017) = 38.7    Infection of vascular bypass graft ([address] 7/24/2017    Left lower extremity    Ischemic cardiomyopathy     Moderate to severe pulmonary hypertension ([address] 7/23/2017    Paroxysmal atrial fibrillation ([address]     Peripheral artery disease ([address] 1/25/2017    Skin ulcer of malleolar area of right ankle ([facility]     Spinal stenosis of lumbar region with radiculopathy 5/4/2015    Dr. Saint [place]      Past Surgical History:   Procedure Laterality Date    CARDIAC CATHETERIZATION  3/8/2017         CARDIAC CATHETERIZATION  3/15/2017         CORONARY STENT SINGLE W/PTCA  3/15/2017         HX ABOVE KNEE AMPUTATION Left 08/18/2017    S/P Left above-the-knee amputation (8/18/2017 - Dr. Demarcus Colvin)    HX ATHERECTOMY Left 06/15/2017    S/P Atherectomy and balloon angioplasty of the left superficial femoral artery and above-knee popliteal artery (6/15/2017 - Dr. Demarcus Colvin)    HX ATHERECTOMY Right 09/07/2017    S/P Atherectomy and balloon angioplasty of the below-the-knee popliteal artery, above-the-knee popliteal artery, tibioperoneal trunk artery and posterior tibial artery (9/7/2017 - Dr. Demarcus Colvin)    HX COLONOSCOPY  07/23/2015    polyps repeat 2020 5 years Maximo [address] Right 04/04/2017    S/P Right axillary to bifemoral artery bypass using an 8 mm Propaten graft from the right axilla to the right common femoral artery with a jump femoral-femoral bypass with another 8 mm Propaten external ring bypass; Right common femoral artery, and profunda femoris artery and superficial femoral artery endarterectomy causing an extensive surgery on the right side (4/4/2017 - Dr. Maryanne Ayala)    Ceasar 94 Right 04/07/2017    S/P Cutdown of femoral-femoral bypass, more on the left groin side; Right lower extremity angiography with first-order catheterization (4/7/2017 - Dr. Demarcus Colvin)    HX FEMORAL BYPASS Right 04/10/2017    S/P Right femoral to above-knee popliteal bypass with an 8 mm PTFE graft (4/10/2017 - Dr. Kenyatta Lackey)    HX OTHER SURGICAL Left 07/25/2017    S/P Removal of infected graft; Repair of left superficial femoral artery; Repair of left common femoral artery (7/25/2017 - Dr. Demarcus Colvin)    HX OTHER SURGICAL Right 06/27/2017    S/P Drainage of right side abscess (6/27/2017 - Dr. Khang Oneal)    HX OTHER SURGICAL Left 07/01/2017    S/P Left groin exploration; Left femoral repair pseudoaneurysm; Left wound washout; Wound VAC placement (7/01/2017 - Dr. Khang Oneal)    HX OTHER SURGICAL Left 07/04/2017    S/P Left common femoral artery ligation; Jump bypass from right to left fem-fem to a more distal superficial femoral artery using 8 mm external ringed Propaten graft; Left groin wound exploration and washout (7/4/2017 - Dr. Khang Oneal)    HX OTHER SURGICAL Right 08/18/2017    S/P Right axillary femoral jump bypass interposition; Removal of infected right axillary femoral bypass; Wound VAC placement of upper thigh 1.2 cm x 5.2 cm x 1.8 cm and groin 4 cm x 0.5 cm x 0.2 cm (8/18/2017 - Dr. Khang Oneal)      Social History   Substance Use Topics    Smoking status: Former Smoker    Smokeless tobacco: Never Used      Comment: quit in 2011    Alcohol use 1.2 oz/week     2 Cans of beer per week      Comment: 10 cans of beer a month      History   Smoking Status    Former Smoker   Smokeless Tobacco    Never Used     Comment: quit in 2011     Family History   Problem Relation Age of Onset    Heart Disease Father       Current Outpatient Prescriptions   Medication Sig    cephALEXin (KEFLEX) 500 mg capsule Take 1 Cap by mouth three (3) times daily.  clopidogrel (PLAVIX) 75 mg tab Take 1 Tab by mouth daily.  gabapentin (NEURONTIN) 400 mg capsule Take two capsules in the morning and take one capsule in the afternoon and evening.  nystatin (MYCOSTATIN) 100,000 unit/gram ointment Apply  to affected area two (2) times a day.  gabapentin (NEURONTIN) 400 mg capsule take 1 capsule by mouth three times a day    atorvastatin (LIPITOR) 40 mg tablet Take 1 Tab by mouth daily.     zinc sulfate (ZINCATE) 220 (50) mg capsule take 1 tablet by mouth once daily    furosemide (LASIX) 20 mg tablet 1 tablet daily  Indications: Pulmonary Edema due to Chronic Heart Failure    cholecalciferol (VITAMIN D3) 1,000 unit cap Take  by mouth daily.  metoprolol tartrate (LOPRESSOR) 25 mg tablet Take 1 Tab by mouth every twelve (12) hours. Indications: hypertension, VENTRICULAR RATE CONTROL IN ATRIAL FIBRILLATION    magnesium oxide (MAG-OX) 400 mg tablet Take 1 Tab by mouth daily.  losartan (COZAAR) 50 mg tablet Take 1 Tab by mouth daily.  amiodarone (CORDARONE) 200 mg tablet take 1 tablet by mouth once daily INDICATION: VENTRICULAR RATE CONTROL IN ATRIAL FIBRILLATION    multivitamin (ONE A DAY) tablet Take 1 Tab by mouth daily.  potassium chloride (KLOR-CON) 20 mEq packet Take 1 Packet by mouth daily. [while on Furosemide]  Indications: hypokalemia prevention    aspirin 81 mg chewable tablet Take 1 Tab by mouth daily (with breakfast). No current facility-administered medications for this visit. Allergies   Allergen Reactions    Morphine Other (comments)     Patient gets confused with morphine. Review of Systems:  Pertinent items are noted in the History of Present Illness. Objective:     Visit Vitals    /48 (BP 1 Location: Left arm, BP Patient Position: At rest)    Pulse 69    Temp 97.4 °F (36.3 °C) (Oral)    Resp 20    Ht 5' 10\" (1.778 m)    Wt 83 kg (183 lb)    BMI 26.26 kg/m2       Physical Exam:      General:  in no apparent distress, alert and oriented times 3   Eyes:  conjunctivae and sclerae normal, pupils equal, round, reactive to light   Throat & Neck: no erythema or exudates noted and neck supple and symmetrical; no palpable masses               Back: There is a 4 cm round back mass with surrounding erythema of about 10 cm consistent with an infected sebaceous cyst. It is partially open with minimal drainage. It is tender to palpation.             Imaging and Lab Review:     CBC:   Lab Results   Component Value Date/Time    WBC 10.8 10/08/2017 12:15 AM RBC 3.74 (L) 10/08/2017 12:15 AM    HGB 10.7 (L) 10/08/2017 12:15 AM    HCT 32.6 (L) 10/08/2017 12:15 AM    PLATELET 978 69/82/8172 12:15 AM     BMP:   Lab Results   Component Value Date/Time    Glucose 101 (H) 10/08/2017 12:15 AM    Sodium 139 10/08/2017 12:15 AM    Potassium 4.0 10/08/2017 12:15 AM    Chloride 104 10/08/2017 12:15 AM    CO2 28 10/08/2017 12:15 AM    BUN 29 (H) 10/08/2017 12:15 AM    Creatinine 1.16 10/08/2017 12:15 AM    Calcium 8.8 10/08/2017 12:15 AM     CMP:  Lab Results   Component Value Date/Time    Glucose 101 (H) 10/08/2017 12:15 AM    Sodium 139 10/08/2017 12:15 AM    Potassium 4.0 10/08/2017 12:15 AM    Chloride 104 10/08/2017 12:15 AM    CO2 28 10/08/2017 12:15 AM    BUN 29 (H) 10/08/2017 12:15 AM    Creatinine 1.16 10/08/2017 12:15 AM    Calcium 8.8 10/08/2017 12:15 AM    Anion gap 7 10/08/2017 12:15 AM    BUN/Creatinine ratio 25 (H) 10/08/2017 12:15 AM    Alk. phosphatase 362 (H) 10/08/2017 12:15 AM    Protein, total 8.0 10/08/2017 12:15 AM    Albumin 3.2 (L) 10/08/2017 12:15 AM    Globulin 4.8 (H) 10/08/2017 12:15 AM    A-G Ratio 0.7 (L) 10/08/2017 12:15 AM       No results found for this or any previous visit (from the past 24 hour(s)). images and reports reviewed    Assessment:   Nathalie Rinne is a 59 y.o. male is presenting with infected back sebaceous cyst. It has opened spontaneously and has been draining. The patient is feeling better. I explained to the patient that it is better not to perform the surgery while there is still inflammation/cellulitis. We will wait till this gets better which may take 1 to 2 weeks and than decide if surgery is needed. Plan:     Continue with PO antibiotics  Follow up with me in 1 to 2 weeks.     Please call me if you have any questions (cell phone: [phone])     Signed By: Freedom Alvarenga MD     May 30, 2018

## 2018-05-30 NOTE — PROGRESS NOTES
Casandra Noe is a 59 y.o. male who presents today with   Chief Complaint   Patient presents with    Skin Problem     Pt presents today for evaluation of cyst mid back                1. Have you been to the ER, urgent care clinic since your last visit? Hospitalized since your last visit? No    2. Have you seen or consulted any other health care providers outside of the [address] [place] [address] since your last visit? Include any pap smears or colon screening.  No

## 2018-05-30 NOTE — MR AVS SNAPSHOT
[facility] 
 
 
 [address] [address] 
[phone] Patient: Myrna Bai MRN: [id number] UUR:[id number] Visit Information Date & Time Provider Department Dept. Phone Encounter #  
 5/30/2018 11:45 AM Estrada Estrada MD [facility] [phone] [id number] Your Appointments 5/30/2018 11:45 AM  
New Patient with Estrada Estrada MD  
[facility] [address]) Appt Note: Luis Cyst/Dr LAISHA Gutierrez referring/Notes in [address] [facility]; Transportation issues; R/S missed appointment/ cyst is draining; $60 cp/ pwk/ photo ID/ ins card. ...[facility] in [address]  
 [address] [address] Shobha Kerry De [address] [place] 6/19/2018  9:15 AM  
FOLLOW UP EXAM with Boris Herrera MD  
[facility] ([address] [place] [address]) Appt Note: 4 mth, f/u  
 [address] [facility] [address] [address] [address] [facility] [address] [address] [address] [address]  
  
    
 6/26/2018  2:00 PM  
Follow Up with Estrada Estrada MD  
[address] [place] [address]) Appt Note: follow up from appointment dated 05- (Pt wants a Tues or Thurs) [address] [place] [address] [address]  
  
   
 [address] [place] [address] Kerry De Gas[address]  
  
    
 9/4/2018 10:20 AM  
Follow Up with Jaxon Reeves DO [facility] ([address] [place] [address]) Appt Note: 6 month follow up Priyanka [address] [address] [address] [phone] [address] [phone]  
  
    
 10/4/2018 12:45 PM  
PROCEDURE with [facility] 2 [facility] ([address] [place] [address]) Appt Note: BPG 6 MOS/EDGAR  
 [address] [place] [address] [address] [facility]  
[phone] [address] [place] [address],[address]  
  
    
 10/4/2018  1:45 PM  
PROCEDURE with [facility] IM[facility] 2 [facility] ([facility]) Appt Note: CV 6 MOS/EDGAR  
 [address] [place] [address] [address] [facility]  
[phone]  
  
    
 10/4/2018  2:45 PM  
PROCEDURE with [facility] NONIMAGING [facility] ([facility]) Appt Note: MARINA 6 MOS/EDGAR  
 [address] [place] [address] [address] [facility]  
[phone] [address]  
  
    
 10/10/2018 10:15 AM  
Follow Up with Cosme Homans, MD  
[address] [facility]) Appt Note: 6 MONTHS FOLLOW UP AFTER STUDIES  
 [address], [place] [address] [address] [facility]  
[phone] [address] [facility] [address] [facility] Upcoming Health Maintenance Date Due ZOSTER VACCINE AGE 60> 1/22/2018 Influenza Age 5 to Adult 8/1/2018 COLONOSCOPY 7/23/2020 DTaP/Tdap/Td series (2 - Td) 7/5/2026 Allergies as of 5/30/2018  Review Complete On: 5/30/2018 By: Gopi Bear Severity Noted Reaction Type Reactions Morphine  08/03/2017   Side Effect Other (comments) Patient gets confused with morphine. Current Immunizations  Reviewed on 11/20/2017 Name Date Influenza Vaccine (Quad) PF 11/20/2017 Tdap 7/5/2016 Not reviewed this visit Vitals BP Pulse Temp Resp Height(growth percentile) Weight(growth percentile) 139/48 (BP 1 Location: Left arm, BP Patient Position: At rest) 69 97.4 °F (36.3 °C) (Oral) 20 5' 10\" (1.778 m) 183 lb (83 kg) BMI Smoking Status 26.26 kg/m2 Former Smoker Vitals History BMI and BSA Data  Body Mass Index Body Surface Area  
 26.26 kg/m 2 2.02 m 2  
  
  
 Preferred Pharmacy Pharmacy Name Phone [facility] Your Updated Medication List  
  
   
This list is accurate as of 5/30/18 11:37 AM.  Always use your most recent med list.  
  
  
  
  
 amiodarone 200 mg tablet Commonly known as:  CORDARONE  
take 1 tablet by mouth once daily INDICATION: VENTRICULAR RATE CONTROL IN ATRIAL FIBRILLATION  
  
 aspirin 81 mg chewable tablet Take 1 Tab by mouth daily (with breakfast). atorvastatin 40 mg tablet Commonly known as:  LIPITOR Take 1 Tab by mouth daily. cephALEXin 500 mg capsule Commonly known as:  Marlyce Nepali Take 1 Cap by mouth three (3) times daily. clopidogrel 75 mg Tab Commonly known as:  PLAVIX Take 1 Tab by mouth daily. furosemide 20 mg tablet Commonly known as:  LASIX  
1 tablet daily  Indications: Pulmonary Edema due to Chronic Heart Failure * gabapentin 400 mg capsule Commonly known as:  NEURONTIN  
take 1 capsule by mouth three times a day * gabapentin 400 mg capsule Commonly known as:  NEURONTIN Take two capsules in the morning and take one capsule in the afternoon and evening. losartan 50 mg tablet Commonly known as:  COZAAR Take 1 Tab by mouth daily. magnesium oxide 400 mg tablet Commonly known as:  MAG-OX Take 1 Tab by mouth daily. metoprolol tartrate 25 mg tablet Commonly known as:  LOPRESSOR Take 1 Tab by mouth every twelve (12) hours. Indications: hypertension, VENTRICULAR RATE CONTROL IN ATRIAL FIBRILLATION  
  
 multivitamin tablet Commonly known as:  ONE A DAY Take 1 Tab by mouth daily. nystatin 100,000 unit/gram ointment Commonly known as:  MYCOSTATIN Apply  to affected area two (2) times a day. potassium chloride 20 mEq packet Commonly known as:  KLOR-CON Take 1 Packet by mouth daily. [while on Furosemide]  Indications: hypokalemia prevention VITAMIN D3 1,000 unit Cap Generic drug:  cholecalciferol Take  by mouth daily. zinc sulfate 220 (50) mg capsule Commonly known as:  ZINCATE  
take 1 tablet by mouth once daily * Notice: This list has 2 medication(s) that are the same as other medications prescribed for you. Read the directions carefully, and ask your doctor or other care provider to review them with you. Introducing [facility]! Juan Manuel Cooper introduces [employer] patient portal. Now you can access parts of your medical record, email your doctor's office, and request medication refills online. 1. In your internet browser, go to [email or web address] 2. Click on the First Time User? Click Here link in the Sign In box. You will see the New Member Sign Up page. 3. Enter your [employer] Access Code exactly as it appears below. You will not need to use this code after youve completed the sign-up process. If you do not sign up before the expiration date, you must request a new code. · [employer] Access Code: [email or web address] Expires: 6/27/2018  4:06 PM 
 
4. Enter the last four digits of your Social Security Number (xxxx) and Date of Birth (mm/dd/yyyy) as indicated and click Submit. You will be taken to the next sign-up page. 5. Create a [employer] ID. This will be your [employer] login ID and cannot be changed, so think of one that is secure and easy to remember. 6. Create a [employer] password. You can change your password at any time. 7. Enter your Password Reset Question and Answer. This can be used at a later time if you forget your password. 8. Enter your e-mail address. You will receive e-mail notification when new information is available in [address] 9. Click Sign Up. You can now view and download portions of your medical record. 10. Click the Download Summary menu link to download a portable copy of your medical information. If you have questions, please visit the Frequently Asked Questions section of the [employer] website. Remember, [facility] is NOT to be used for urgent needs. For medical emergencies, dial 911. Now available from your iPhone and Android! Please provide this summary of care documentation to your next provider. Your primary care clinician is listed as Susana Suarez. If you have any questions after today's visit, please call [phone].

## 2018-06-19 ENCOUNTER — OFFICE VISIT (OUTPATIENT)
Dept: FAMILY MEDICINE CLINIC | Age: 58
End: 2018-06-19

## 2018-06-19 VITALS
HEIGHT: 70 IN | BODY MASS INDEX: 27.77 KG/M2 | WEIGHT: 194 LBS | OXYGEN SATURATION: 98 % | DIASTOLIC BLOOD PRESSURE: 72 MMHG | HEART RATE: 62 BPM | RESPIRATION RATE: 16 BRPM | TEMPERATURE: 98.2 F | SYSTOLIC BLOOD PRESSURE: 124 MMHG

## 2018-06-19 DIAGNOSIS — I11.0 BENIGN HYPERTENSIVE HEART DISEASE WITH SYSTOLIC CONGESTIVE HEART FAILURE, NYHA CLASS 2 (HCC): Chronic | ICD-10-CM

## 2018-06-19 DIAGNOSIS — Z89.612 STATUS POST ABOVE KNEE AMPUTATION OF LEFT LOWER EXTREMITY: ICD-10-CM

## 2018-06-19 DIAGNOSIS — I70.213 ATHEROSCLEROSIS OF NATIVE ARTERY OF BOTH LOWER EXTREMITIES WITH INTERMITTENT CLAUDICATION (HCC): Primary | Chronic | ICD-10-CM

## 2018-06-19 DIAGNOSIS — I50.20 BENIGN HYPERTENSIVE HEART DISEASE WITH SYSTOLIC CONGESTIVE HEART FAILURE, NYHA CLASS 2 (HCC): Chronic | ICD-10-CM

## 2018-06-19 DIAGNOSIS — I25.10 CORONARY ARTERY DISEASE INVOLVING NATIVE CORONARY ARTERY OF NATIVE HEART WITHOUT ANGINA PECTORIS: Chronic | ICD-10-CM

## 2018-06-19 DIAGNOSIS — Z86.39 HISTORY OF VITAMIN D DEFICIENCY: ICD-10-CM

## 2018-06-19 DIAGNOSIS — I48.0 PAROXYSMAL ATRIAL FIBRILLATION (HCC): ICD-10-CM

## 2018-06-19 NOTE — PROGRESS NOTES
1. Have you been to the ER, urgent care clinic since your last visit? Hospitalized since your last visit? No    2. Have you seen or consulted any other health care providers outside of the [address] [place] [address] since your last visit? Include any pap smears or colon screening.  No

## 2018-06-19 NOTE — PROGRESS NOTES
Jonas Landeros, 59 y.o.,  male    SUBJECTIVE  Ff-up     PAD bilateral- s/p AKA Left leg and complicated revasculariation. 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 Continues to follow vascular specialist, and podiatry, reports fair response to neurontin for leg pain. Reports no change in R leg ulcers, off loading. Lives with daughter who is caring for him full time. H/o CAD s/p angioplasty and Afib. Rectal bleeding on coumadin which was discontinued. Denies cp, syncope, palpitations, on amiodarone asa/plavix. Has transitioned to see dr. Estrada Desai, reviewed note. Increased lipitor dose to 80 mg. Reviewed lipid profile LDL 65    H/o blood loss anemia, on po fe. Forgot to have labs drawn prior to visit today. We are attempting to come off some of his medications if no longer necessary. Recent infected sebaceous cyst has resolved with po keflex.      ROS:  See HPI, all others negative        Patient Active Problem List   Diagnosis Code    Benign hypertensive heart disease with systolic congestive heart failure, NYHA class 2 (HCC) I11.0, I50.20    DDD (degenerative disc disease), lumbar M51.36    Erectile dysfunction N52.9    Spinal stenosis of lumbar region with radiculopathy M48.061, M54.16    Hereditary peripheral neuropathy G60.9    Aortoiliac occlusive disease ([address] I74.09    Atherosclerosis of native artery of both lower extremities with intermittent claudication ([address] I70.213    Peripheral artery disease ([address] I73.9    Coronary artery disease involving native coronary artery of native heart I25.10    Paroxysmal atrial fibrillation (HCC) I48.0    Acute blood loss as cause of postoperative anemia D62    Impaired mobility and ADLs Z74.09    Ischemic cardiomyopathy I25.5    Chronic systolic heart failure (HCC) I50.22    Carotid artery disease (HCC) I77.9    Dyslipidemia E78.5    Euthyroid sick syndrome E07.81    Aftercare following surgery of the circulatory system Z48.812    Status post femoral-popliteal bypass surgery Z95.828    History of cardioversion Z98.890    Critical ischemia of lower extremity I99.8    History of vitamin D deficiency Z86.39    Pseudoaneurysm of femoral artery ([facility]) I72.4    Infection of vascular bypass graft ([facility] T82. 7XXA    Chronic anemia D64.9    Chronic ulcer of right heel ([facility]) L97.419    Ischemic rest pain of lower extremity ([facility]) I73.9    Prolonged Q-T interval on ECG R94.31    Status post above knee amputation of left lower extremity ([facility] P63.918    Aftercare for amputation stump Z47.81    Moderate to severe pulmonary hypertension ([facility]) I27.20    Adverse effect of amiodarone T46.2X5A    Skin ulcer of malleolar area of right ankle ([facility]) L97.319       Current Outpatient Prescriptions   Medication Sig Dispense Refill    clopidogrel (PLAVIX) 75 mg tab Take 1 Tab by mouth daily. 30 Tab 6    gabapentin (NEURONTIN) 400 mg capsule take 1 capsule by mouth three times a day 270 Cap 0    atorvastatin (LIPITOR) 40 mg tablet Take 1 Tab by mouth daily. 90 Tab 3    zinc sulfate (ZINCATE) 220 (50) mg capsule take 1 tablet by mouth once daily 100 Cap 0    furosemide (LASIX) 20 mg tablet 1 tablet daily  Indications: Pulmonary Edema due to Chronic Heart Failure 90 Tab 0    cholecalciferol (VITAMIN D3) 1,000 unit cap Take  by mouth daily.  metoprolol tartrate (LOPRESSOR) 25 mg tablet Take 1 Tab by mouth every twelve (12) hours. Indications: hypertension, VENTRICULAR RATE CONTROL IN ATRIAL FIBRILLATION 180 Tab 3    magnesium oxide (MAG-OX) 400 mg tablet Take 1 Tab by mouth daily. 90 Tab 3    losartan (COZAAR) 50 mg tablet Take 1 Tab by mouth daily. 90 Tab 2    amiodarone (CORDARONE) 200 mg tablet take 1 tablet by mouth once daily INDICATION: VENTRICULAR RATE CONTROL IN ATRIAL FIBRILLATION 90 Tab 3    multivitamin (ONE A DAY) tablet Take 1 Tab by mouth daily.       potassium chloride (KLOR-CON) 20 mEq packet Take 1 Packet by mouth daily. [while on Furosemide]  Indications: hypokalemia prevention 30 Packet 0    aspirin 81 mg chewable tablet Take 1 Tab by mouth daily (with breakfast). 90 Tab 3    nystatin (MYCOSTATIN) 100,000 unit/gram ointment Apply  to affected area two (2) times a day. 15 g 0       Allergies   Allergen Reactions    Morphine Other (comments)     Patient gets confused with morphine. Past Medical History:   Diagnosis Date    Acute blood loss as cause of postoperative anemia 4/4/2017    Anticoagulated on Coumadin     Aortoiliac occlusive disease (HCC) 1/25/2017    Atherosclerosis of native artery of both lower extremities with intermittent claudication ([address] 1/25/2017    Benign hypertensive heart disease with systolic congestive heart failure, NYHA class 2 ([address] 1/26/2015    Carotid artery disease (HCC)     Chronic anemia     Chronic systolic heart failure (HCC)     Chronic ulcer of right foot ([address] 4/4/2017    Chronic ulcer of right heel ([address] 8/8/2017    Coronary artery disease involving native coronary artery of native heart 3/15/2017    Successful stenting of Cx (Xience LESLEY) and RCA (Xience LESLEY) to 0% by Dr. Imelda De La Cruz on 3/15/17.     DDD (degenerative disc disease), lumbar 1/26/2015    Dyslipidemia     Erectile dysfunction 7/5/2016    Euthyroid sick syndrome 4/6/2017    Hereditary peripheral neuropathy 11/15/2016    History of cardioversion 4/18/2017    S/P Synchronized external cardioversion (4/18/2017 - Dr. Ariane Curiel)    History of vitamin D deficiency 4/22/2017    Vitamin D 25-Hydroxy (4/22/2017) = 12.0; Vitamin D 25-Hydroxy (5/26/2017) = 38.7    Infection of vascular bypass graft ([address] 7/24/2017    Left lower extremity    Ischemic cardiomyopathy     Moderate to severe pulmonary hypertension ([address] 7/23/2017    Paroxysmal atrial fibrillation (HCC)     Peripheral artery disease ([address] 1/25/2017    Skin ulcer of malleolar area of right ankle ([address]     Spinal stenosis of lumbar region with radiculopathy 5/4/2015    Dr. Callie Matthews       Social History     Social History    Marital status: LEGALLY      Spouse name: N/A    Number of children: N/A    Years of education: N/A     Occupational History    Not on file. Social History Main Topics    Smoking status: Former Smoker    Smokeless tobacco: Never Used      Comment: quit in 2011    Alcohol use 1.2 oz/week     2 Cans of beer per week      Comment: 10 cans of beer a month    Drug use: Yes     Special: Marijuana      Comment: occasionally-last used 4/17    Sexual activity: Yes     Partners: Female     Other Topics Concern    Not on file     Social History Narrative       Family History   Problem Relation Age of Onset    Heart Disease Father          OBJECTIVE    Physical Exam:     Visit Vitals    /72 (BP 1 Location: Left arm, BP Patient Position: Sitting)    Pulse 62    Temp 98.2 °F (36.8 °C) (Oral)    Resp 16    Ht 5' 10\" (1.778 m)    Wt 194 lb (88 kg)    SpO2 98%    BMI 27.84 kg/m2       General: alert, in no apparent distress or pain  Neck: supple, no adenopathy palpated  CVS: normal rate, regular rhythm, distinct S1 and S2  Lungs:clear to ausculation bilaterally, no crackles, wheezing or rhonchi noted  Extremities: L AKA  W/ prosthesis  Skin: warm, no lesions, rashes noted  Psych:  mood and affect normal    Results for orders placed or performed during the hospital encounter of 04/05/18   POC CREATININE   Result Value Ref Range    Creatinine, POC 1.0 0.6 - 1.3 MG/DL    GFRAA, POC >60 >60 ml/min/1.73m2    GFRNA, POC >60 >60 ml/min/1.73m2   '    ASSESSMENT/PLAN  Diagnoses and all orders for this visit:    PAD (peripheral artery disease) (HCC)  S/p L AKA, and complicated revascularization  plavix, asa, statin  Following Vasc surgery closely, and podiatry for R foot ulcers  Fair response to  gabapentin dose of 400 mg tid.      Benign hypertensive heart disease with systolic congestive heart failure, NYHA class 2 ([facility]  Appears compensated  Cont lasix, on BB and ARB  -     furosemide (LASIX) 20 mg tablet; 1 tablet daily      Chronic systolic heart failure (HCC)  -     furosemide (LASIX) 20 mg tablet; 1 tablet daily       Coronary artery disease involving native coronary artery of native heart without angina pectoris  S/p stent  On ASA, plavix, BB, ARB, statin  Cont care with     Dyslipidemia  LDL goal is <70,   On lipitor 80 mg  Check CMP/Vit D/magnesium prior to next visit, and see if we can wean off some of MVT meds. Status post above knee amputation of left lower extremity (HCC)    Paroxysmal atrial fibrillation (HCC)  Rate controlled, anticoag is contraindicated due to bleeding  On amiodarone and BB    History of Vitamin D deficiency  Recheck    History of anemia  Recheck CBC      BMI 27  Has regained weight, commended on healthy food choices, monitoring    Follow-up Disposition:  Return in about 3 months (around 9/19/2018), or if symptoms worsen or fail to improve. Patient understands plan of care. Patient has provided input and agrees with goals.

## 2018-06-21 ENCOUNTER — HOSPITAL ENCOUNTER (OUTPATIENT)
Dept: LAB | Age: 58
Discharge: HOME | End: 2018-06-21
Payer: COMMERCIAL

## 2018-06-21 DIAGNOSIS — Z86.39 HISTORY OF VITAMIN D DEFICIENCY: ICD-10-CM

## 2018-06-21 DIAGNOSIS — I48.0 PAROXYSMAL ATRIAL FIBRILLATION (HCC): ICD-10-CM

## 2018-06-21 DIAGNOSIS — D64.9 CHRONIC ANEMIA: Chronic | ICD-10-CM

## 2018-06-21 DIAGNOSIS — E78.5 DYSLIPIDEMIA: Chronic | ICD-10-CM

## 2018-06-21 LAB
25(OH)D3 SERPL-MCNC: 38.1 NG/ML (ref 30–100)
ALBUMIN SERPL-MCNC: 3.8 G/DL (ref 3.4–5)
ALBUMIN/GLOB SERPL: 1 {RATIO} (ref 0.8–1.7)
ALP SERPL-CCNC: 205 U/L (ref 45–117)
ALT SERPL-CCNC: 36 U/L (ref 16–61)
ANION GAP SERPL CALC-SCNC: 9 MMOL/L (ref 3–18)
AST SERPL-CCNC: 31 U/L (ref 15–37)
BASOPHILS # BLD: 0 K/UL (ref 0–0.06)
BASOPHILS NFR BLD: 0 % (ref 0–2)
BILIRUB SERPL-MCNC: 0.5 MG/DL (ref 0.2–1)
BUN SERPL-MCNC: 18 MG/DL (ref 7–18)
BUN/CREAT SERPL: 16 (ref 12–20)
CALCIUM SERPL-MCNC: 8.9 MG/DL (ref 8.5–10.1)
CHLORIDE SERPL-SCNC: 111 MMOL/L (ref 100–108)
CO2 SERPL-SCNC: 23 MMOL/L (ref 21–32)
CREAT SERPL-MCNC: 1.11 MG/DL (ref 0.6–1.3)
DIFFERENTIAL METHOD BLD: ABNORMAL
EOSINOPHIL # BLD: 0.2 K/UL (ref 0–0.4)
EOSINOPHIL NFR BLD: 3 % (ref 0–5)
ERYTHROCYTE [DISTWIDTH] IN BLOOD BY AUTOMATED COUNT: 15.1 % (ref 11.6–14.5)
GLOBULIN SER CALC-MCNC: 3.7 G/DL (ref 2–4)
GLUCOSE SERPL-MCNC: 96 MG/DL (ref 74–99)
HCT VFR BLD AUTO: 42.8 % (ref 36–48)
HGB BLD-MCNC: 14.4 G/DL (ref 13–16)
LYMPHOCYTES # BLD: 2.3 K/UL (ref 0.9–3.6)
LYMPHOCYTES NFR BLD: 26 % (ref 21–52)
MAGNESIUM SERPL-MCNC: 2.2 MG/DL (ref 1.6–2.6)
MCH RBC QN AUTO: 29.8 PG (ref 24–34)
MCHC RBC AUTO-ENTMCNC: 33.6 G/DL (ref 31–37)
MCV RBC AUTO: 88.6 FL (ref 74–97)
MONOCYTES # BLD: 0.7 K/UL (ref 0.05–1.2)
MONOCYTES NFR BLD: 7 % (ref 3–10)
NEUTS SEG # BLD: 5.8 K/UL (ref 1.8–8)
NEUTS SEG NFR BLD: 64 % (ref 40–73)
PLATELET # BLD AUTO: 236 K/UL (ref 135–420)
PMV BLD AUTO: 10.3 FL (ref 9.2–11.8)
POTASSIUM SERPL-SCNC: 4.4 MMOL/L (ref 3.5–5.5)
PROT SERPL-MCNC: 7.5 G/DL (ref 6.4–8.2)
RBC # BLD AUTO: 4.83 M/UL (ref 4.7–5.5)
SODIUM SERPL-SCNC: 143 MMOL/L (ref 136–145)
WBC # BLD AUTO: 9 K/UL (ref 4.6–13.2)

## 2018-06-21 PROCEDURE — 85025 COMPLETE CBC W/AUTO DIFF WBC: CPT | Performed by: FAMILY MEDICINE

## 2018-06-21 PROCEDURE — 83735 ASSAY OF MAGNESIUM: CPT | Performed by: FAMILY MEDICINE

## 2018-06-21 PROCEDURE — 80053 COMPREHEN METABOLIC PANEL: CPT | Performed by: FAMILY MEDICINE

## 2018-06-21 PROCEDURE — 36415 COLL VENOUS BLD VENIPUNCTURE: CPT | Performed by: FAMILY MEDICINE

## 2018-06-21 PROCEDURE — 82306 VITAMIN D 25 HYDROXY: CPT | Performed by: FAMILY MEDICINE

## 2018-06-21 RX ORDER — ATORVASTATIN CALCIUM 80 MG/1
80 TABLET, FILM COATED ORAL DAILY
Qty: 90 TAB | Refills: 2 | Status: SHIPPED | OUTPATIENT
Start: 2018-06-21 | End: 2018-09-24 | Stop reason: SDUPTHER

## 2018-06-25 RX ORDER — CHOLECALCIFEROL (VITAMIN D3) 25 MCG
TABLET ORAL
Qty: 90 TAB | Refills: 2 | OUTPATIENT
Start: 2018-06-25

## 2018-06-25 NOTE — PROGRESS NOTES
Vit d and magnesium levels are normal.  Will try him off vit d/mg supplements and plan to recheck on next visit. Anemia has resolved. pls notify pt/daughter.

## 2018-06-25 NOTE — TELEPHONE ENCOUNTER
Since vit d level is now normal. Will try him off of vit d and recheck in a few months to see if necessary to keep taking. pls notify pt.

## 2018-06-26 NOTE — TELEPHONE ENCOUNTER
Contacted patient and verified identity using name and date of birth (2- identifiers). Please see result note. Patient was informed vit d level is now normal and will try him off of vid d and recheck in a few months. Patient voiced understanding.

## 2018-06-26 NOTE — PROGRESS NOTES
Contacted patient and verified identity using name and date of birth (2- identifiers). Patient advised vit d and magnesium levels are normal. Will try him off vit d/mg supplements and plan to recheck on next visit. Anemia has resolved. Scheduled follow-up appointment for 9/20/18 with Dr. Allyssa Ware. Patient voiced understanding.

## 2018-08-19 NOTE — PROGRESS NOTES
Patient in hospital under observational status after cardiac cath with stent placement from 3- to 3-. Patient seen in office today with Dr. Bina Thompson for follow up. Nurse navigator will make contact with patient in 1 week to follow up.
19-Aug-2018

## 2018-09-10 RX ORDER — ZINC SULFATE 50(220)MG
CAPSULE ORAL
Qty: 100 CAP | Refills: 0 | Status: SHIPPED | OUTPATIENT
Start: 2018-09-10 | End: 2018-09-20

## 2018-09-20 ENCOUNTER — OFFICE VISIT (OUTPATIENT)
Dept: FAMILY MEDICINE CLINIC | Age: 58
End: 2018-09-20

## 2018-09-20 VITALS
HEART RATE: 61 BPM | DIASTOLIC BLOOD PRESSURE: 78 MMHG | TEMPERATURE: 97.7 F | HEIGHT: 70 IN | WEIGHT: 202 LBS | BODY MASS INDEX: 28.92 KG/M2 | OXYGEN SATURATION: 98 % | RESPIRATION RATE: 15 BRPM | SYSTOLIC BLOOD PRESSURE: 118 MMHG

## 2018-09-20 DIAGNOSIS — I50.20 BENIGN HYPERTENSIVE HEART DISEASE WITH SYSTOLIC CONGESTIVE HEART FAILURE, NYHA CLASS 2 (HCC): Chronic | ICD-10-CM

## 2018-09-20 DIAGNOSIS — I73.9 PERIPHERAL ARTERY DISEASE (HCC): Primary | Chronic | ICD-10-CM

## 2018-09-20 DIAGNOSIS — I70.213 ATHEROSCLEROSIS OF NATIVE ARTERY OF BOTH LOWER EXTREMITIES WITH INTERMITTENT CLAUDICATION (HCC): Chronic | ICD-10-CM

## 2018-09-20 DIAGNOSIS — I11.0 BENIGN HYPERTENSIVE HEART DISEASE WITH SYSTOLIC CONGESTIVE HEART FAILURE, NYHA CLASS 2 (HCC): Chronic | ICD-10-CM

## 2018-09-20 DIAGNOSIS — Z23 ENCOUNTER FOR IMMUNIZATION: ICD-10-CM

## 2018-09-20 DIAGNOSIS — R21 RASH: ICD-10-CM

## 2018-09-20 RX ORDER — CLOTRIMAZOLE AND BETAMETHASONE DIPROPIONATE 10; .64 MG/G; MG/G
CREAM TOPICAL
Qty: 30 G | Refills: 0 | Status: SHIPPED | OUTPATIENT
Start: 2018-09-20 | End: 2018-12-27

## 2018-09-20 NOTE — PROGRESS NOTES
1. Have you been to the ER, urgent care clinic since your last visit? Hospitalized since your last visit? No    2. Have you seen or consulted any other health care providers outside of the [address] [place] [address] since your last visit? Include any pap smears or colon screening. Podiatry 9/6/18 Dr. Magdy Tineo    Chief Complaint   Patient presents with    Hypertension    Cholesterol Problem    Vitamin D Deficiency    Other     PAD       Seasonal influenza vaccine 0.5 ml given IM in RT deltoid. Lot # [id number], exp date 06/12/2019. Patient tolerated injection well. No adverse reaction noted.

## 2018-09-20 NOTE — PROGRESS NOTES
Airam Cain, 59 y.o.,  male    SUBJECTIVE  Ff-up     PAD bilateral- s/p AKA Left leg and complicated revasculariation (98/2017). 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 Continues to follow vascular specialist, and podiatry, reports fair response to neurontin for leg pain. Reports healed R leg ulcers. He is complaining of redness and discharge on groin and L medial thigh, he was given nystatin by dr. Juliocesar Juarez few weeks ago without much improvement. No fever. H/o CAD s/p angioplasty and Afib.h/o of GI bleeding on coumadin  Denies cp, syncope, palpitations, on amiodarone asa/plavix. he is following dr. Pierre Alanis. HTN- on BB, arb,  lasix.  Reviewed labs    Vit d def- taking daily supplement    ROS:  See HPI, all others negative        Patient Active Problem List   Diagnosis Code    Benign hypertensive heart disease with systolic congestive heart failure, NYHA class 2 ([facility]) I11.0, I50.20    DDD (degenerative disc disease), lumbar M51.36    Erectile dysfunction N52.9    Spinal stenosis of lumbar region with radiculopathy M48.061, M54.16    Hereditary peripheral neuropathy G60.9    Aortoiliac occlusive disease ([address] I74.09    Atherosclerosis of native artery of both lower extremities with intermittent claudication ([address] I70.213    Peripheral artery disease ([address] I73.9    Coronary artery disease involving native coronary artery of native heart I25.10    Paroxysmal atrial fibrillation (HCC) I48.0    Acute blood loss as cause of postoperative anemia D62    Impaired mobility and ADLs Z74.09    Ischemic cardiomyopathy I25.5    Chronic systolic heart failure (HCC) I50.22    Carotid artery disease ([facility]) I77.9    Dyslipidemia E78.5    Euthyroid sick syndrome E07.81    Aftercare following surgery of the circulatory system Z48.812    Status post femoral-popliteal bypass surgery Z95.828    History of cardioversion Z98.890    Critical ischemia of lower extremity I99.8    History of vitamin D deficiency Z86.39    Pseudoaneurysm of femoral artery ([facility]) I72.4    Infection of vascular bypass graft ([facility] T82. 7XXA    Chronic anemia D64.9    Chronic ulcer of right heel ([facility]) L97.419    Ischemic rest pain of lower extremity ([facility]) I73.9    Prolonged Q-T interval on ECG R94.31    Status post above knee amputation of left lower extremity ([facility] M27.193    Aftercare for amputation stump Z47.81    Moderate to severe pulmonary hypertension ([facility]) I27.20    Adverse effect of amiodarone T46.2X5A    Skin ulcer of malleolar area of right ankle ([facility]) L97.319       Current Outpatient Prescriptions   Medication Sig Dispense Refill    clotrimazole-betamethasone (LOTRISONE) topical cream Apply thin layer to affected area twice day. 30 g 0    losartan (COZAAR) 50 mg tablet take 1 tablet by mouth once daily 90 Tab 3    aspirin 81 mg chewable tablet chew and swallow 1 tablet by mouth once daily WITH BREAKFAST 90 Tab 2    atorvastatin (LIPITOR) 80 mg tablet Take 1 Tab by mouth daily. 90 Tab 2    clopidogrel (PLAVIX) 75 mg tab Take 1 Tab by mouth daily. 30 Tab 6    gabapentin (NEURONTIN) 400 mg capsule take 1 capsule by mouth three times a day 270 Cap 0    furosemide (LASIX) 20 mg tablet 1 tablet daily  Indications: Pulmonary Edema due to Chronic Heart Failure 90 Tab 0    cholecalciferol (VITAMIN D3) 1,000 unit cap Take  by mouth daily.  metoprolol tartrate (LOPRESSOR) 25 mg tablet Take 1 Tab by mouth every twelve (12) hours. Indications: hypertension, VENTRICULAR RATE CONTROL IN ATRIAL FIBRILLATION 180 Tab 3    magnesium oxide (MAG-OX) 400 mg tablet Take 1 Tab by mouth daily. 90 Tab 3    amiodarone (CORDARONE) 200 mg tablet take 1 tablet by mouth once daily INDICATION: VENTRICULAR RATE CONTROL IN ATRIAL FIBRILLATION 90 Tab 3    multivitamin (ONE A DAY) tablet Take 1 Tab by mouth daily.  potassium chloride (KLOR-CON) 20 mEq packet Take 1 Packet by mouth daily.  Kalie Stone on Furosemide] Indications: hypokalemia prevention 30 Packet 0       Allergies   Allergen Reactions    Morphine Other (comments)     Patient gets confused with morphine. Past Medical History:   Diagnosis Date    Acute blood loss as cause of postoperative anemia 4/4/2017    Anticoagulated on Coumadin     Aortoiliac occlusive disease (HCC) 1/25/2017    Atherosclerosis of native artery of both lower extremities with intermittent claudication ([address] 1/25/2017    Benign hypertensive heart disease with systolic congestive heart failure, NYHA class 2 ([address] 1/26/2015    Carotid artery disease (HCC)     Chronic anemia     Chronic systolic heart failure (HCC)     Chronic ulcer of right foot ([address] 4/4/2017    Chronic ulcer of right heel ([address] 8/8/2017    Coronary artery disease involving native coronary artery of native heart 3/15/2017    Successful stenting of Cx (Xience LESLEY) and RCA (Xience LESLEY) to 0% by Dr. Luc Lees on 3/15/17.     DDD (degenerative disc disease), lumbar 1/26/2015    Dyslipidemia     Erectile dysfunction 7/5/2016    Euthyroid sick syndrome 4/6/2017    Hereditary peripheral neuropathy 11/15/2016    History of cardioversion 4/18/2017    S/P Synchronized external cardioversion (4/18/2017 - Dr. Orion Sales)    History of vitamin D deficiency 4/22/2017    Vitamin D 25-Hydroxy (4/22/2017) = 12.0; Vitamin D 25-Hydroxy (5/26/2017) = 38.7    Infection of vascular bypass graft ([address] 7/24/2017    Left lower extremity    Ischemic cardiomyopathy     Moderate to severe pulmonary hypertension ([address] 7/23/2017    Paroxysmal atrial fibrillation (HCC)     Peripheral artery disease ([address] 1/25/2017    Skin ulcer of malleolar area of right ankle ([address]     Spinal stenosis of lumbar region with radiculopathy 5/4/2015    Dr. Maurilio Li       Social History     Social History    Marital status: LEGALLY      Spouse name: N/A    Number of children: N/A    Years of education: N/A     Occupational History  Not on file. Social History Main Topics    Smoking status: Former Smoker    Smokeless tobacco: Never Used      Comment: quit in 2011    Alcohol use 1.2 oz/week     2 Cans of beer per week      Comment: 10 cans of beer a month    Drug use: Yes     Special: Marijuana      Comment: occasionally-last used 4/17    Sexual activity: Yes     Partners: Female     Other Topics Concern    Not on file     Social History Narrative       Family History   Problem Relation Age of Onset    Heart Disease Father          OBJECTIVE    Physical Exam:     Visit Vitals    /78 (BP 1 Location: Right arm, BP Patient Position: Sitting)    Pulse 61    Temp 97.7 °F (36.5 °C) (Oral)    Resp 15    Ht 5' 10\" (1.778 m)    Wt 202 lb (91.6 kg)    SpO2 98%    BMI 28.98 kg/m2       General: alert, in no apparent distress or pain  Neck: supple, no adenopathy palpated  CVS: normal rate, regular rhythm, distinct S1 and S2  Lungs:clear to ausculation bilaterally, no crackles, wheezing or rhonchi noted  Extremities: L AKA  W/ prosthesis  Skin: L groin and medial thigh redness, no scaling, no discharge  Psych:  mood and affect normal    Results for orders placed or performed during the hospital encounter of 06/21/18   MAGNESIUM   Result Value Ref Range    Magnesium 2.2 1.6 - 2.6 mg/dL   VITAMIN D, 25 HYDROXY   Result Value Ref Range    Vitamin D 25-Hydroxy 38.1 30 - 129 ng/mL   METABOLIC PANEL, COMPREHENSIVE   Result Value Ref Range    Sodium 143 136 - 145 mmol/L    Potassium 4.4 3.5 - 5.5 mmol/L    Chloride 111 (H) 100 - 108 mmol/L    CO2 23 21 - 32 mmol/L    Anion gap 9 3.0 - 18 mmol/L    Glucose 96 74 - 99 mg/dL    BUN 18 7.0 - 18 MG/DL    Creatinine 1.11 0.6 - 1.3 MG/DL    BUN/Creatinine ratio 16 12 - 20      GFR est AA >60 >60 ml/min/1.73m2    GFR est non-AA >60 >60 ml/min/1.73m2    Calcium 8.9 8.5 - 10.1 MG/DL    Bilirubin, total 0.5 0.2 - 1.0 MG/DL    ALT (SGPT) 36 16 - 61 U/L    AST (SGOT) 31 15 - 37 U/L    Alk. phosphatase 205 (H) 45 - 117 U/L    Protein, total 7.5 6.4 - 8.2 g/dL    Albumin 3.8 3.4 - 5.0 g/dL    Globulin 3.7 2.0 - 4.0 g/dL    A-G Ratio 1.0 0.8 - 1.7     CBC WITH AUTOMATED DIFF   Result Value Ref Range    WBC 9.0 4.6 - 13.2 K/uL    RBC 4.83 4.70 - 5.50 M/uL    HGB 14.4 13.0 - 16.0 g/dL    HCT 42.8 36.0 - 48.0 %    MCV 88.6 74.0 - 97.0 FL    MCH 29.8 24.0 - 34.0 PG    MCHC 33.6 31.0 - 37.0 g/dL    RDW 15.1 (H) 11.6 - 14.5 %    PLATELET 439 122 - 802 K/uL    MPV 10.3 9.2 - 11.8 FL    NEUTROPHILS 64 40 - 73 %    LYMPHOCYTES 26 21 - 52 %    MONOCYTES 7 3 - 10 %    EOSINOPHILS 3 0 - 5 %    BASOPHILS 0 0 - 2 %    ABS. NEUTROPHILS 5.8 1.8 - 8.0 K/UL    ABS. LYMPHOCYTES 2.3 0.9 - 3.6 K/UL    ABS. MONOCYTES 0.7 0.05 - 1.2 K/UL    ABS. EOSINOPHILS 0.2 0.0 - 0.4 K/UL    ABS. BASOPHILS 0.0 0.0 - 0.06 K/UL    DF AUTOMATED     '    ASSESSMENT/PLAN  Diagnoses and all orders for this visit:    PAD (peripheral artery disease) ([facility]  S/p L AKA (*/54), and complicated revascularization  plavix, asa, statin  Following Vasc surgery and podiatry, reports improvement on R foot ulcers  Fair response to  gabapentin dose of 400 mg tid. Benign hypertensive heart disease with systolic congestive heart failure, NYHA class 2 (HCC)  Appears compensated  Cont lasix, on BB and ARB     Chronic systolic heart failure (HCC)  Doing well  On bb, arb, diuretic + mg/kcl     Coronary artery disease involving native coronary artery of native heart without angina pectoris  S/p stent  On ASA, plavix, BB, ARB, statin  following     Dyslipidemia  LDL goal is <70,   On lipitor 80 mg  Check CMP/Vit D/magnesium prior to next visit, and see if we can wean off some of MVT meds.       Status post above knee amputation of left lower extremity (HCC)    Paroxysmal atrial fibrillation (HCC)  Rate controlled, anticoag is contraindicated due to bleeding  On amiodarone and BB    Vitamin D deficiency  Normal, cont 1000 iu daily    Rash  persistent  Trial lotrisone  Refer to dermatology    Encounter for vaccine  Flu vaccine given today    Follow-up Disposition:  Return in about 4 months (around 1/20/2019), or if symptoms worsen or fail to improve. Patient understands plan of care. Patient has provided input and agrees with goals.

## 2018-09-20 NOTE — MR AVS SNAPSHOT
[address] [place] [address] [address] 706 [facility] 
[phone] Patient: Jose D Eaton MRN: [id number] CCE:[birth year]23 Visit Information Date & Time Provider Department Dept. Phone Encounter #  
 9/20/2018  8:30 AM Cholo Thibodeaux, [facility] [id number] Follow-up Instructions Return in about 4 months (around 1/20/2019), or if symptoms worsen or fail to improve. Your Appointments 9/28/2018  3:20 PM  
Follow Up with Nicholas Carbone DO [facility] ([address] [place] [address] Road) Appt Note: 6 month follow up; l/m to r/s appt due to provider out of office-alb; 6 month f/up Pri[place] [phone]  
[phone] [place] [phone]  
  
    
 10/23/2018  8:00 AM  
PROCEDURE with [facility] IMAGING 1 [facility] ([address] [place] [address]) Appt Note: aorta iliac duplex/michaels; PT rescheduled due to work. confirmed new appt [address] [facility] Robert Villareal 072 706 [facility]  
[phone] [address] [place] [address]  
  
    
 10/23/2018  9:00 AM  
PROCEDURE with [facility] NONIMAGING [facility] ([address] [place] [address]) Appt Note: MARINA 6 MOS/MICHAELS; r/s; PT rescheduled due to work. confirmed new appt [address] [facility] Robert Villareal 390 706 [facility]  
[phone] [address] [place], [place] [id number]  
  
    
 10/23/2018 10:00 AM  
PROCEDURE with [facility] IMAGING 2 [facility] ([address] [place] [address]) Appt Note: BPG 6 MOS/MICHAELS; r/s; PT rescheduled due to work. confirmed new appt 23[address] [facility] Robert Villareal 552 706 [facility]  
[phone]  [address] [place] [address]  
  
    
 10/23/2018 11:00 AM  
PROCEDURE with [facility] IMAGING 1  
 [facility] ([facility]) Appt Note: CV 6 MOS/EDGAR; r/s; PT rescheduled due to work. confirmed new appt 2300 [facility] Montse Mosqueda 495 200 [facility]  
[phone]  
  
    
 10/31/2018  9:45 AM  
Follow Up with Enoc Patel MD  
[facility] [address] [place] [address]) Appt Note: 6 MONTHS FOLLOW UP AFTER STUDIES; r/s  
 [address] [place], [place] [address] 200 [facility]  
[phone] 2300 [facility] 200 [facility] Upcoming Health Maintenance Date Due ZOSTER VACCINE AGE 60> 1/22/2018 Influenza Age 5 to Adult 8/1/2018 COLONOSCOPY 7/23/2020 DTaP/Tdap/Td series (2 - Td) 7/5/2026 Allergies as of 9/20/2018  Review Complete On: 9/20/2018 By: El Bruno MD  
  
 Severity Noted Reaction Type Reactions Morphine  08/03/2017   Side Effect Other (comments) Patient gets confused with morphine. Current Immunizations  Reviewed on 9/20/2018 Name Date Influenza Vaccine (Quad) PF 9/20/2018, 11/20/2017 Tdap 7/5/2016 Reviewed by Imelda Mendez LPN on 9/56/2450 at  8:52 AM  
You Were Diagnosed With   
  
 Codes Comments Peripheral artery disease ([facility]    -  Primary ICD-10-CM: I73.9 ICD-9-CM: 443.9 Encounter for immunization     ICD-10-CM: X78 ICD-9-CM: V03.89 Rash     ICD-10-CM: R21 
ICD-9-CM: 782.1 Benign hypertensive heart disease with systolic congestive heart failure, NYHA class 2 (HCC)     ICD-10-CM: I11.0, I50.20 ICD-9-CM: 402.11, 428.20, 428.0 Atherosclerosis of native artery of both lower extremities with intermittent claudication ([facility]     ICD-10-CM: D81.252 ICD-9-CM: 440.21 Vitals BP Pulse Temp Resp Height(growth percentile) Weight(growth percentile) 118/78 (BP 1 Location: Right arm, BP Patient Position: Sitting) 61 97.7 °F (36.5 °C) (Oral) 15 5' 10\" (1.778 m) 202 lb (91.6 kg) SpO2 BMI Smoking Status 98% 28.98 kg/m2 Former Smoker Vitals History BMI and BSA Data Body Mass Index Body Surface Area  
 28.98 kg/m 2 2.13 m 2 Preferred Pharmacy Pharmacy Name Phone [facility], [address] [phone] Your Updated Medication List  
  
   
This list is accurate as of 9/20/18  9:07 AM.  Always use your most recent med list.  
  
  
  
  
 amiodarone 200 mg tablet Commonly known as:  CORDARONE  
take 1 tablet by mouth once daily INDICATION: VENTRICULAR RATE CONTROL IN ATRIAL FIBRILLATION  
  
 aspirin 81 mg chewable tablet  
chew and swallow 1 tablet by mouth once daily WITH BREAKFAST  
  
 atorvastatin 80 mg tablet Commonly known as:  LIPITOR Take 1 Tab by mouth daily. clopidogrel 75 mg Tab Commonly known as:  PLAVIX Take 1 Tab by mouth daily. clotrimazole-betamethasone topical cream  
Commonly known as:  Leldon Bertha Apply thin layer to affected area twice day. furosemide 20 mg tablet Commonly known as:  LASIX  
1 tablet daily  Indications: Pulmonary Edema due to Chronic Heart Failure  
  
 gabapentin 400 mg capsule Commonly known as:  NEURONTIN  
take 1 capsule by mouth three times a day  
  
 losartan 50 mg tablet Commonly known as:  COZAAR  
take 1 tablet by mouth once daily  
  
 magnesium oxide 400 mg tablet Commonly known as:  MAG-OX Take 1 Tab by mouth daily. metoprolol tartrate 25 mg tablet Commonly known as:  LOPRESSOR Take 1 Tab by mouth every twelve (12) hours. Indications: hypertension, VENTRICULAR RATE CONTROL IN ATRIAL FIBRILLATION  
  
 multivitamin tablet Commonly known as:  ONE A DAY Take 1 Tab by mouth daily. potassium chloride 20 mEq packet Commonly known as:  KLOR-CON Take 1 Packet by mouth daily. [while on Furosemide]  Indications: hypokalemia prevention VITAMIN D3 1,000 unit Cap Generic drug:  cholecalciferol Take  by mouth daily. Prescriptions Sent to Pharmacy Refills  
 clotrimazole-betamethasone (LOTRISONE) topical cream 0 Sig: Apply thin layer to affected area twice day. Class: Normal  
 Pharmacy: [id number] [address] [place] [address],[address], [address]  #: [phone] We Performed the Following INFLUENZA VIRUS VAC QUAD,SPLIT,PRESV FREE SYRINGE IM [id number] CPT(R)] REFERRAL TO DERMATOLOGY [REF19 Custom] Follow-up Instructions Return in about 4 months (around 1/20/2019), or if symptoms worsen or fail to improve. Referral Information Referral ID Referred By Referred To  
  
 [id number] Dirk Mahnaz ORTIZ Not Available Visits Status Start Date End Date 1 New Request 9/20/18 9/20/19 If your referral has a status of pending review or denied, additional information will be sent to support the outcome of this decision. Introducing [facility]! [facility] introduces [employer] patient portal. Now you can access parts of your medical record, email your doctor's office, and request medication refills online. 1. In your internet browser, go to [email or web address] 2. Click on the First Time User? Click Here link in the Sign In box. You will see the New Member Sign Up page. 3. Enter your [employer] Access Code exactly as it appears below. You will not need to use this code after youve completed the sign-up process. If you do not sign up before the expiration date, you must request a new code. · [employer] Access Code: [email or web address] Expires: 12/19/2018  8:44 AM 
 
4. Enter the last four digits of your Social Security Number (xxxx) and Date of Birth (mm/dd/yyyy) as indicated and click Submit. You will be taken to the next sign-up page. 5. Create a Xiaoy[employer] ID. This will be your [employer] login ID and cannot be changed, so think of one that is secure and easy to remember. 6. Create a [employer] password. You can change your password at any time. 7. Enter your Password Reset Question and Answer. This can be used at a later time if you forget your password. 8. Enter your e-mail address. You will receive e-mail notification when new information is available in [address] 9. Click Sign Up. You can now view and download portions of your medical record. 10. Click the Download Summary menu link to download a portable copy of your medical information. If you have questions, please visit the Frequently Asked Questions section of the [employer] website. Remember, [employer] is NOT to be used for urgent needs. For medical emergencies, dial 911. Now available from your iPhone and Android! Please provide this summary of care documentation to your next provider. Your primary care clinician is listed as Ella Luna. If you have any questions after today's visit, please call [phone].

## 2018-09-24 DIAGNOSIS — I50.22 CHRONIC SYSTOLIC HEART FAILURE (HCC): Chronic | ICD-10-CM

## 2018-09-24 DIAGNOSIS — I11.0 BENIGN HYPERTENSIVE HEART DISEASE WITH SYSTOLIC CONGESTIVE HEART FAILURE, NYHA CLASS 2 (HCC): Chronic | ICD-10-CM

## 2018-09-24 DIAGNOSIS — I50.20 BENIGN HYPERTENSIVE HEART DISEASE WITH SYSTOLIC CONGESTIVE HEART FAILURE, NYHA CLASS 2 (HCC): Chronic | ICD-10-CM

## 2018-09-24 RX ORDER — CLOPIDOGREL BISULFATE 75 MG/1
75 TABLET ORAL DAILY
Qty: 30 TAB | Refills: 6 | Status: CANCELLED | OUTPATIENT
Start: 2018-09-24

## 2018-09-24 NOTE — TELEPHONE ENCOUNTER
This patient contacted office for the following prescriptions to be filled:    Medication requested :   Requested Prescriptions     Pending Prescriptions Disp Refills    losartan (COZAAR) 50 mg tablet 90 Tab 3    amiodarone (CORDARONE) 200 mg tablet 90 Tab 3     Sig: take 1 tablet by mouth once daily INDICATION: VENTRICULAR RATE CONTROL IN ATRIAL FIBRILLATION    furosemide (LASIX) 20 mg tablet 90 Tab 0     Si tablet daily  Indications: Pulmonary Edema due to Chronic Heart Failure    atorvastatin (LIPITOR) 80 mg tablet 90 Tab 2     Sig: Take 1 Tab by mouth daily.  clopidogrel (PLAVIX) 75 mg tab 30 Tab 6     Sig: Take 1 Tab by mouth daily.     aspirin 81 mg chewable tablet 90 Tab 2     PCP: [address] [facility] [address] or Print:  [employer] order or Local pharmacy [facility]     Scheduled appointment if not seen by current providers in office:  LOV 2018 f/u 2018

## 2018-09-25 RX ORDER — AMIODARONE HYDROCHLORIDE 200 MG/1
TABLET ORAL
Qty: 90 TAB | Refills: 3 | Status: SHIPPED | OUTPATIENT
Start: 2018-09-25 | End: 2019-06-27

## 2018-09-25 RX ORDER — GUAIFENESIN 100 MG/5ML
LIQUID (ML) ORAL
Qty: 90 TAB | Refills: 2 | Status: SHIPPED | OUTPATIENT
Start: 2018-09-25 | End: 2019-06-19 | Stop reason: SDUPTHER

## 2018-09-25 RX ORDER — LOSARTAN POTASSIUM 50 MG/1
TABLET ORAL
Qty: 90 TAB | Refills: 3 | Status: SHIPPED | OUTPATIENT
Start: 2018-09-25 | End: 2018-09-28 | Stop reason: SDUPTHER

## 2018-09-25 RX ORDER — AMIODARONE HYDROCHLORIDE 200 MG/1
TABLET ORAL
Qty: 90 TAB | Refills: 3 | OUTPATIENT
Start: 2018-09-25

## 2018-09-25 RX ORDER — ATORVASTATIN CALCIUM 80 MG/1
80 TABLET, FILM COATED ORAL DAILY
Qty: 90 TAB | Refills: 2 | Status: SHIPPED | OUTPATIENT
Start: 2018-09-25 | End: 2019-06-19 | Stop reason: SDUPTHER

## 2018-09-25 RX ORDER — AMIODARONE HYDROCHLORIDE 200 MG/1
TABLET ORAL
Qty: 90 TAB | Refills: 3 | Status: CANCELLED | OUTPATIENT
Start: 2018-09-25

## 2018-09-25 RX ORDER — FUROSEMIDE 20 MG/1
TABLET ORAL
Qty: 90 TAB | Refills: 1 | Status: SHIPPED | OUTPATIENT
Start: 2018-09-25 | End: 2019-03-25 | Stop reason: SDUPTHER

## 2018-09-25 RX ORDER — CLOPIDOGREL BISULFATE 75 MG/1
75 TABLET ORAL DAILY
Qty: 30 TAB | Refills: 6 | Status: SHIPPED | OUTPATIENT
Start: 2018-09-25 | End: 2019-07-30 | Stop reason: SDUPTHER

## 2018-09-28 ENCOUNTER — OFFICE VISIT (OUTPATIENT)
Dept: CARDIOLOGY CLINIC | Age: 58
End: 2018-09-28

## 2018-09-28 VITALS
HEART RATE: 62 BPM | DIASTOLIC BLOOD PRESSURE: 95 MMHG | OXYGEN SATURATION: 99 % | WEIGHT: 200 LBS | SYSTOLIC BLOOD PRESSURE: 160 MMHG | BODY MASS INDEX: 28.63 KG/M2 | HEIGHT: 70 IN

## 2018-09-28 DIAGNOSIS — I48.0 PAROXYSMAL ATRIAL FIBRILLATION (HCC): ICD-10-CM

## 2018-09-28 DIAGNOSIS — E78.5 DYSLIPIDEMIA: Chronic | ICD-10-CM

## 2018-09-28 DIAGNOSIS — I25.10 CORONARY ARTERY DISEASE INVOLVING NATIVE CORONARY ARTERY OF NATIVE HEART WITHOUT ANGINA PECTORIS: Chronic | ICD-10-CM

## 2018-09-28 DIAGNOSIS — I77.9 BILATERAL CAROTID ARTERY DISEASE (HCC): Chronic | ICD-10-CM

## 2018-09-28 DIAGNOSIS — I25.5 ISCHEMIC CARDIOMYOPATHY: Primary | Chronic | ICD-10-CM

## 2018-09-28 DIAGNOSIS — I50.20 BENIGN HYPERTENSIVE HEART DISEASE WITH SYSTOLIC CONGESTIVE HEART FAILURE, NYHA CLASS 2 (HCC): Chronic | ICD-10-CM

## 2018-09-28 DIAGNOSIS — I11.0 BENIGN HYPERTENSIVE HEART DISEASE WITH SYSTOLIC CONGESTIVE HEART FAILURE, NYHA CLASS 2 (HCC): Chronic | ICD-10-CM

## 2018-09-28 DIAGNOSIS — I50.22 CHRONIC SYSTOLIC HEART FAILURE (HCC): Chronic | ICD-10-CM

## 2018-09-28 RX ORDER — LOSARTAN POTASSIUM 100 MG/1
100 TABLET ORAL DAILY
Qty: 30 TAB | Refills: 6 | Status: SHIPPED | OUTPATIENT
Start: 2018-09-28 | End: 2019-05-02 | Stop reason: SDUPTHER

## 2018-09-28 NOTE — PROGRESS NOTES
HPI:   I saw Luanne Blanchard in my office today in cardiovascular evaluation regarding his problems with a dilated cardiomyopathy and paroxysmal atrial fibrillation. Mr. Cheryl Blanchard is a pleasant 67-year-old  male who has been previously followed by my associate Dr. Ellen Terry for a nonischemic cardiomyopathy. Historically, he has had multiple issues including dyslipidemia peripheral vascular disease status post a left AKA amputation, paroxysmal atrial fibrillation for which she has been on Coumadin in the past and underwent a cardioversion back in April 2017, and coronary artery disease with a cardiac catheterization back on March 8, 2017 demonstrating mild disease except for proximal 50% obstruction and healed 90% trifurcation lesion in the mid circumflex as well as a 50% diffuse proximal and 90% mid right coronary artery obstruction with successful stenting of the circumflex and the RCA on March 15, 2017 using Xience drug-eluting stents. He comes in today relates that he is continued to do quite well. He is staying fairly active and denies any cardiovascular symptomatology whatsoever at this time. Encounter Diagnoses   Name Primary?  Coronary artery disease involving native coronary artery of native heart without angina pectoris     Ischemic cardiomyopathy Yes    Chronic systolic heart failure (HCC)     Dyslipidemia     Benign hypertensive heart disease with systolic congestive heart failure, NYHA class 2 (HCC)     Paroxysmal atrial fibrillation (HCC)     Bilateral carotid artery disease ([address]        Discussion: This gentleman appears to be doing about as well as we could expect and really of no recommendations for change at this time. Is not having any symptoms to suggest the development of recurrent symptomatic obstructive coronary disease or heart failure. He is on amiodarone and whether or not he needs to be on that long-term is really not clear.   I am going to get some pulmonary function tests with DLCO's and thyroid function tests and will review the chart to see how difficult it was to keep him in sinus, but I may well plan on discontinuing the amiodarone pending the results of my review and the above laboratory testing. He has had problems with paroxysmal fibrillation for which she is on amiodarone but I do not have a recent pulmonary function test or any thyroid testing which I would get completed at this time. His latest lipid profile which was completed on March 15 type thousand 18 is excellent with total cholesterol 117, triglycerides 108, HDL of 30, LDL of 65, and VLDL of 22 which I think is excellent control on up to 80 mg daily. His blood pressure was somewhat elevated today at 146/101, but it was my first patient and he had not taken any of his medications. I checked again myself and got 160/95 this is about 2.5-3 hours after he took his medication, so I am going to increase his Cozaar from 50 mg a day to 100 mg a day and have him follow-up with Dr. Walter Mak for further adjustment of his blood pressure medications moving forward. He may need the addition of another agent such as amlodipine which I would leave to her.     PCP: Will Rodriguez MD      Past Medical History:   Diagnosis Date    Acute blood loss as cause of postoperative anemia 4/4/2017    Anticoagulated on Coumadin     Aortoiliac occlusive disease (HCC) 1/25/2017    Atherosclerosis of native artery of both lower extremities with intermittent claudication ([address] 1/25/2017    Benign hypertensive heart disease with systolic congestive heart failure, NYHA class 2 ([address] 1/26/2015    Carotid artery disease (HCC)     Chronic anemia     Chronic systolic heart failure (HCC)     Chronic ulcer of right foot ([address] 4/4/2017    Chronic ulcer of right heel ([address] 8/8/2017    Coronary artery disease involving native coronary artery of native heart 3/15/2017    Successful stenting of Cx (Xience LESLEY) and RCA (Xience LESLEY) to 0% by Dr. Dk Guallpa on 3/15/17.     DDD (degenerative disc disease), lumbar 1/26/2015    Dyslipidemia     Erectile dysfunction 7/5/2016    Euthyroid sick syndrome 4/6/2017    Hereditary peripheral neuropathy 11/15/2016    History of cardioversion 4/18/2017    S/P Synchronized external cardioversion (4/18/2017 - Dr. Celeste England)    History of vitamin D deficiency 4/22/2017    Vitamin D 25-Hydroxy (4/22/2017) = 12.0; Vitamin D 25-Hydroxy (5/26/2017) = 38.7    Infection of vascular bypass graft ([facility] 7/24/2017    Left lower extremity    Ischemic cardiomyopathy     Moderate to severe pulmonary hypertension ([address] 7/23/2017    Paroxysmal atrial fibrillation (HCC)     Peripheral artery disease ([address] 1/25/2017    Skin ulcer of malleolar area of right ankle ([facility]     Spinal stenosis of lumbar region with radiculopathy 5/4/2015    Dr. Ever Velasquez       Past Surgical History:   Procedure Laterality Date    CARDIAC CATHETERIZATION  3/8/2017         CARDIAC CATHETERIZATION  3/15/2017         CORONARY STENT SINGLE W/PTCA  3/15/2017         HX ABOVE KNEE AMPUTATION Left 08/18/2017    S/P Left above-the-knee amputation (8/18/2017 - Dr. Mary Jo Matute)    HX ATHERECTOMY Left 06/15/2017    S/P Atherectomy and balloon angioplasty of the left superficial femoral artery and above-knee popliteal artery (6/15/2017 - Dr. Mary Jo Matute)    HX ATHERECTOMY Right 09/07/2017    S/P Atherectomy and balloon angioplasty of the below-the-knee popliteal artery, above-the-knee popliteal artery, tibioperoneal trunk artery and posterior tibial artery (9/7/2017 - Dr. Mary Jo Matute)    HX COLONOSCOPY  07/23/2015    polyps repeat 2020 5 years Kianna Henderson [address] Right 04/04/2017    S/P Right axillary to bifemoral artery bypass using an 8 mm Propaten graft from the right axilla to the right common femoral artery with a jump femoral-femoral bypass with another 8 mm Propaten external ring bypass; Right common femoral artery, and profunda femoris artery and superficial femoral artery endarterectomy causing an extensive surgery on the right side (4/4/2017 - Dr. Serigo Kessler)   [address] Right 04/07/2017    S/P Cutdown of femoral-femoral bypass, more on the left groin side; Right lower extremity angiography with first-order catheterization (4/7/2017 - Dr. Suzan Hannah)    HX FEMORAL BYPASS Right 04/10/2017    S/P Right femoral to above-knee popliteal bypass with an 8 mm PTFE graft (4/10/2017 - Dr. Maggie Smallwood)    HX OTHER SURGICAL Left 07/25/2017    S/P Removal of infected graft; Repair of left superficial femoral artery; Repair of left common femoral artery (7/25/2017 - Dr. Suzan Hannah)    HX OTHER SURGICAL Right 06/27/2017    S/P Drainage of right side abscess (6/27/2017 - Dr. Suzan Hannah)    HX OTHER SURGICAL Left 07/01/2017    S/P Left groin exploration; Left femoral repair pseudoaneurysm; Left wound washout; Wound VAC placement (7/01/2017 - Dr. Suzan Hannah)    HX OTHER SURGICAL Left 07/04/2017    S/P Left common femoral artery ligation; Jump bypass from right to left fem-fem to a more distal superficial femoral artery using 8 mm external ringed Propaten graft; Left groin wound exploration and washout (7/4/2017 - Dr. Suzan Hannah)    HX OTHER SURGICAL Right 08/18/2017    S/P Right axillary femoral jump bypass interposition; Removal of infected right axillary femoral bypass; Wound VAC placement of upper thigh 1.2 cm x 5.2 cm x 1.8 cm and groin 4 cm x 0.5 cm x 0.2 cm (8/18/2017 - Dr. Suzan Hannah)       Current Outpatient Prescriptions   Medication Sig    losartan (COZAAR) 50 mg tablet take 1 tablet by mouth once daily    furosemide (LASIX) 20 mg tablet 1 tablet daily  Indications: Pulmonary Edema due to Chronic Heart Failure    atorvastatin (LIPITOR) 80 mg tablet Take 1 Tab by mouth daily.     aspirin 81 mg chewable tablet chew and swallow 1 tablet by mouth once daily WITH BREAKFAST    clopidogrel (PLAVIX) 75 mg tab Take 1 Tab by mouth daily.  amiodarone (CORDARONE) 200 mg tablet take 1 tablet by mouth once daily INDICATION: VENTRICULAR RATE CONTROL IN ATRIAL FIBRILLATION    clotrimazole-betamethasone (LOTRISONE) topical cream Apply thin layer to affected area twice day.  gabapentin (NEURONTIN) 400 mg capsule take 1 capsule by mouth three times a day    cholecalciferol (VITAMIN D3) 1,000 unit cap Take  by mouth daily.  metoprolol tartrate (LOPRESSOR) 25 mg tablet Take 1 Tab by mouth every twelve (12) hours. Indications: hypertension, VENTRICULAR RATE CONTROL IN ATRIAL FIBRILLATION    magnesium oxide (MAG-OX) 400 mg tablet Take 1 Tab by mouth daily.  multivitamin (ONE A DAY) tablet Take 1 Tab by mouth daily.  potassium chloride (KLOR-CON) 20 mEq packet Take 1 Packet by mouth daily. [while on Furosemide]  Indications: hypokalemia prevention     No current facility-administered medications for this visit. Allergies   Allergen Reactions    Morphine Other (comments)     Patient gets confused with morphine. Social History :  Social History   Substance Use Topics    Smoking status: Former Smoker    Smokeless tobacco: Never Used      Comment: quit in 2011    Alcohol use 1.2 oz/week     2 Cans of beer per week      Comment: 10 cans of beer a month        Family History: family history includes Heart Disease in his father. Review of Systems:  Constitutional: Negative. Respiratory: Negative. Cardiovascular: Negative. Gastrointestinal: Negative. Musculoskeletal: Positive for falls. Negative for joint pain and myalgias. Neurological: Negative for dizziness. Physical Exam:    The patient is a cooperative, alert, well developed, well nourished 59 y.o.  male who is in no acute distress at the time of the examination.   Visit Vitals    BP (!) 160/95 (BP 1 Location: Left arm, BP Patient Position: Sitting)    Pulse 62    Ht 5' 10\" (1.778 m)    Wt 90.7 kg (200 lb)    SpO2 99%    BMI 28.7 kg/m2       HEENT: Conjuctiva white, mucosa moist, no pallor or cyanosis. NECK: Supple without masses, tenderness or thyromegaly. There was no jugular venous distention. Carotid are full bilaterally without bruits. CHEST: Symmetrical with good excursion. LUNGS: Clear to auscultation in all fields. HEART: The apex is not displaced. There were no lifts, thrills or heaves. There is a normal S1 and S2 without appreciable murmurs, rubs, clicks, or gallops auscultated. ABDOMEN: Soft without masses, tenderness or organomegaly. EXTREMITIES: 1 + peripheral pulses on the right leg without edema and left AKA. INTEGUMENT: Warm and dry   NEUROLOGICAL: The patient is oriented x 3 with motor function grossly intact. Review of Data: See PMH and Cardiology and Imaging sections for cardiac testing  Lab Results   Component Value Date/Time    Cholesterol, total 117 03/15/2018 12:00 AM    HDL Cholesterol 30 (L) 03/15/2018 12:00 AM    LDL, calculated 65 03/15/2018 12:00 AM    Triglyceride 108 03/15/2018 12:00 AM    CHOL/HDL Ratio 5.6 (H) 12/29/2015 07:18 AM       Results for orders placed or performed in visit on 09/28/18   AMB POC EKG ROUTINE W/ 12 LEADS, INTER & REP     Status: None    Narrative    Normal sinus rhythm, rate 62. This EKG is within normal limits and similar to the EKG of March 6, 2018. You Lane D.O., F.A.C.C. [facility]  [facility] [place]  [address]      PLEASE NOTE:  This document has been produced using voice recognition software. Unrecognized errors in transcription may be present.

## 2018-09-28 NOTE — PROGRESS NOTES
Jones Quiroga presents today for   Chief Complaint   Patient presents with    Coronary Artery Disease     6 month follow up    Irregular Heart Beat       Jones Quiroga preferred language for health care discussion is english/other. Is someone accompanying this pt?no    Is the patient using any DME equipment during OV? no    Depression Screening:  PHQ over the last two weeks 9/28/2018   PHQ Not Done -   Little interest or pleasure in doing things Not at all   Feeling down, depressed, irritable, or hopeless Not at all   Total Score PHQ 2 0       Learning Assessment:  Learning Assessment 9/28/2018   PRIMARY LEARNER Patient   HIGHEST LEVEL OF EDUCATION - PRIMARY LEARNER  GRADUATED HIGH SCHOOL OR GED   BARRIERS PRIMARY LEARNER NONE   CO-LEARNER CAREGIVER No   PRIMARY LANGUAGE ENGLISH    NEED -   LEARNER PREFERENCE PRIMARY READING   ANSWERED BY Patient   RELATIONSHIP SELF       Abuse Screening:  Abuse Screening Questionnaire 2/19/2018   Do you ever feel afraid of your partner? N   Are you in a relationship with someone who physically or mentally threatens you? N   Is it safe for you to go home? Y       Fall Risk  Fall Risk Assessment, last 12 mths 2/19/2018   Able to walk? Yes   Fall in past 12 months? No       Pt currently taking Anticoagulant therapy? Yes    Coordination of Care:  1. Have you been to the ER, urgent care clinic since your last visit? Hospitalized since your last visit? no    2. Have you seen or consulted any other health care providers outside of the [facility] since your last visit? Include any pap smears or colon screening.  no

## 2018-09-28 NOTE — MR AVS SNAPSHOT
[facility] [address] [id number] [address] [id number] [phone] Patient: Higinio Hernandez MRN: [id number] OQB:[id number] Visit Information Date & Time Provider Department Dept. Phone Encounter #  
 9/28/2018  8:00 AM Mak Reed DO [facility] [id number] Follow-up Instructions Return in about 3 months (around 12/28/2018), or if symptoms worsen or fail to improve. Your Appointments 10/23/2018  8:00 AM  
PROCEDURE with [facility] IMAGING 1 [facility] ([facility]) Appt Note: aorta iliac duplex/michaels; PT rescheduled due to work. confirmed new appt [address] [facility] [address] [facility] [address]  
[phone] [address] [place] [address]  
  
    
 10/23/2018  9:00 AM  
PROCEDURE with [facility] NONIMAGING [facility] ([facility]) Appt Note: MARINA 6 MOS/MICHAELS; r/s; PT rescheduled due to work. confirmed new appt [address] [facility] [address] [facility] [address]  
[phone] [address] [place] [address]  
  
    
 10/23/2018 10:00 AM  
PROCEDURE with [facility] IMAGING 2 [facility] ([facility]) Appt Note: BPG 6 MOS/MICHAELS; r/s; PT rescheduled due to work. [facility] [address] [facility] [facility] [address]  
[phone] [address] [place] [address]  
  
    
 10/23/2018 11:00 AM  
PROCEDURE with [facility] IMAGING 1 [facility] ([facility]) Appt Note: CV 6 MOS/MICHAELS; r/s; PT rescheduled due to work. confirmed new appt [address] [facility] [address] [facility] [address]  
[phone]  
  
    
 10/31/2018  9:45 AM  
Follow Up with MD Danii Arroyo [facility]) Appt Note: 6 MONTHS FOLLOW UP AFTER STUDIES; r/s  
 [place] [address] [address] [facility] [address]  
[phone] [address]  
  
    
 12/27/2018  8:00 AM  
FOLLOW UP EXAM with Luis Fernando Schwab MD  
2056 [facility] ([address]) Appt Note: 3 month follow up 8 [facility] [address] 200 [facility] [address]  
[address] [facility] [address] [facility] [address] Upcoming Health Maintenance Date Due Shingrix Vaccine Age 50> (1 of 2) 3/22/2008 COLONOSCOPY 7/23/2020 DTaP/Tdap/Td series (2 - Td) 7/5/2026 Allergies as of 9/28/2018  Review Complete On: 9/28/2018 By: Ines Villarreal DO Severity Noted Reaction Type Reactions Morphine  08/03/2017   Side Effect Other (comments) Patient gets confused with morphine. Current Immunizations  Reviewed on 9/20/2018 Name Date Influenza Vaccine (Quad) PF 9/20/2018, 11/20/2017 Tdap 7/5/2016 Not reviewed this visit You Were Diagnosed With   
  
 Codes Comments Ischemic cardiomyopathy    -  Primary ICD-10-CM: I25.5 ICD-9-CM: 414.8 Coronary artery disease involving native coronary artery of native heart without angina pectoris     ICD-10-CM: I25.10 ICD-9-CM: 414.01 Chronic systolic heart failure (HCC)     ICD-10-CM: I50.22 ICD-9-CM: 428.22 Dyslipidemia     ICD-10-CM: E78.5 ICD-9-CM: 272.4 Benign hypertensive heart disease with systolic congestive heart failure, NYHA class 2 (HCC)     ICD-10-CM: I11.0, I50.20 ICD-9-CM: 402.11, 428.20, 428.0 Paroxysmal atrial fibrillation (HCC)     ICD-10-CM: I48.0 ICD-9-CM: 427.31 Bilateral carotid artery disease ([facility] [address]     ICD-10-CM: I77.9 ICD-9-CM: 682. 9 Vitals  BP Pulse Height(growth percentile) Weight(growth percentile) SpO2 BMI  
 (!) 160/95 (BP 1 Location: Left arm, BP Patient Position: Sitting) 62 5' 10\" (1.778 m) 200 lb (90.7 kg) 99% 28.7 kg/m2 Smoking Status Former Smoker Vitals History BMI and BSA Data Body Mass Index Body Surface Area 28.7 kg/m 2 2.12 m 2 Preferred Pharmacy Pharmacy Name Phone [facility] [address] [facility], [address] [phone] Your Updated Medication List  
  
   
This list is accurate as of 9/28/18  8:42 AM.  Always use your most recent med list.  
  
  
  
  
 amiodarone 200 mg tablet Commonly known as:  CORDARONE  
take 1 tablet by mouth once daily INDICATION: VENTRICULAR RATE CONTROL IN ATRIAL FIBRILLATION  
  
 aspirin 81 mg chewable tablet  
chew and swallow 1 tablet by mouth once daily WITH BREAKFAST  
  
 atorvastatin 80 mg tablet Commonly known as:  LIPITOR Take 1 Tab by mouth daily. clopidogrel 75 mg Tab Commonly known as:  PLAVIX Take 1 Tab by mouth daily. clotrimazole-betamethasone topical cream  
Commonly known as:  [place] Guardian Apply thin layer to affected area twice day. furosemide 20 mg tablet Commonly known as:  LASIX  
1 tablet daily  Indications: Pulmonary Edema due to Chronic Heart Failure  
  
 gabapentin 400 mg capsule Commonly known as:  NEURONTIN  
take 1 capsule by mouth three times a day  
  
 losartan 50 mg tablet Commonly known as:  COZAAR  
take 1 tablet by mouth once daily  
  
 magnesium oxide 400 mg (241.3 mg magnesium) tablet Commonly known as:  MAG-OX Take 1 Tab by mouth daily. metoprolol tartrate 25 mg tablet Commonly known as:  LOPRESSOR Take 1 Tab by mouth every twelve (12) hours. Indications: hypertension, VENTRICULAR RATE CONTROL IN ATRIAL FIBRILLATION  
  
 multivitamin tablet Commonly known as:  ONE A DAY Take 1 Tab by mouth daily. potassium chloride 20 mEq packet Commonly known as:  KLOR-CON Take 1 Packet by mouth daily. [while on Furosemide]  Indications: hypokalemia prevention VITAMIN D3 1,000 unit Cap Generic drug:  cholecalciferol Take  by mouth daily. We Performed the Following AMB POC EKG ROUTINE W/ 12 LEADS, INTER & REP [97497 CPT(R)] TSH REFLEX TO T4 [64616 CPT(R)] Follow-up Instructions Return in about 3 months (around 12/28/2018), or if symptoms worsen or fail to improve. To-Do List   
 09/28/2018 Lab:  HEPATIC FUNCTION PANEL   
  
 09/28/2018 Lab:  LIPID PANEL   
  
 09/28/2018 PFT:  PFT DLCO Patient Instructions Increase cozaar to 100 mg daily Introducing [facility]! [facility] introduces [employer] patient portal. Now you can access parts of your medical record, email your doctor's office, and request medication refills online. 1. In your internet browser, go to [email or web address] 2. Click on the First Time User? Click Here link in the Sign In box. You will see the New Member Sign Up page. 3. Enter your [employer] Access Code exactly as it appears below. You will not need to use this code after youve completed the sign-up process. If you do not sign up before the expiration date, you must request a new code. · [employer] Access Code: [email or web address] Expires: 12/19/2018  8:44 AM 
 
4. Enter the last four digits of your Social Security Number (xxxx) and Date of Birth (mm/dd/yyyy) as indicated and click Submit. You will be taken to the next sign-up page. 5. Create a [employer] ID. This will be your [employer] login ID and cannot be changed, so think of one that is secure and easy to remember. 6. Create a [employer] password. You can change your password at any time. 7. Enter your Password Reset Question and Answer. This can be used at a later time if you forget your password. 8. Enter your e-mail address. You will receive e-mail notification when new information is available in [address] 9. Click Sign Up. You can now view and download portions of your medical record. 10. Click the Download Summary menu link to download a portable copy of your medical information. If you have questions, please visit the Frequently Asked Questions section of the [employer] website. Remember, [employer] is NOT to be used for urgent needs. For medical emergencies, dial 911. Now available from your iPhone and Android! Please provide this summary of care documentation to your next provider. Your primary care clinician is listed as Cholo Thibodeaux. If you have any questions after today's visit, please call [phone].

## 2018-09-28 NOTE — PROGRESS NOTES
Review of Systems Constitutional: Negative. Respiratory: Negative. Cardiovascular: Negative. Gastrointestinal: Negative. Musculoskeletal: Positive for falls. Negative for joint pain and myalgias. Neurological: Negative for dizziness.

## 2018-10-04 ENCOUNTER — HOSPITAL ENCOUNTER (OUTPATIENT)
Dept: RESPIRATORY THERAPY | Age: 58
Discharge: HOME | End: 2018-10-04
Attending: INTERNAL MEDICINE
Payer: COMMERCIAL

## 2018-10-04 DIAGNOSIS — I25.5 ISCHEMIC CARDIOMYOPATHY: Chronic | ICD-10-CM

## 2018-10-04 DIAGNOSIS — I48.0 PAROXYSMAL ATRIAL FIBRILLATION (HCC): ICD-10-CM

## 2018-10-04 LAB
ARTERIAL PATENCY WRIST A: YES
BASE DEFICIT BLD-SCNC: 3 MMOL/L
BDY SITE: ABNORMAL
GAS FLOW.O2 O2 DELIVERY SYS: ABNORMAL L/MIN
HCO3 BLD-SCNC: 20 MMOL/L (ref 22–26)
PCO2 BLD: 30 MMHG (ref 35–45)
PH BLD: 7.43 [PH] (ref 7.35–7.45)
PO2 BLD: 107 MMHG (ref 80–100)
SAO2 % BLD: 98 % (ref 92–97)
SERVICE CMNT-IMP: ABNORMAL
SPECIMEN TYPE: ABNORMAL

## 2018-10-04 PROCEDURE — 82803 BLOOD GASES ANY COMBINATION: CPT

## 2018-10-04 PROCEDURE — 94729 DIFFUSING CAPACITY: CPT

## 2018-10-04 PROCEDURE — 36600 WITHDRAWAL OF ARTERIAL BLOOD: CPT

## 2018-10-04 PROCEDURE — 94726 PLETHYSMOGRAPHY LUNG VOLUMES: CPT

## 2018-10-04 PROCEDURE — 94060 EVALUATION OF WHEEZING: CPT

## 2018-10-18 ENCOUNTER — TELEPHONE (OUTPATIENT)
Dept: CARDIOLOGY CLINIC | Age: 58
End: 2018-10-18

## 2018-10-18 NOTE — TELEPHONE ENCOUNTER
----- Message from Sandy Javier DO sent at 10/13/2018 12:48 PM EDT -----  This gentleman has some reduction in his DLCO's of a mild to moderate degree but he has pretty good blood gases with a good PO2. Nevertheless I would like to get him off of amiodarone anyway since his left atrial size is normal and I would suspect he is not going to be at high risk of recurrent atrial fib. Please see if you can find a pulmonary function test as a baseline before I make a final decision and let me know either way.  ES

## 2018-10-23 ENCOUNTER — HOSPITAL ENCOUNTER (OUTPATIENT)
Dept: LAB | Age: 58
Discharge: HOME | End: 2018-10-23
Payer: COMMERCIAL

## 2018-10-23 DIAGNOSIS — I25.5 ISCHEMIC CARDIOMYOPATHY: Chronic | ICD-10-CM

## 2018-10-23 DIAGNOSIS — I25.10 CORONARY ARTERY DISEASE INVOLVING NATIVE CORONARY ARTERY OF NATIVE HEART WITHOUT ANGINA PECTORIS: Chronic | ICD-10-CM

## 2018-10-23 DIAGNOSIS — E78.5 DYSLIPIDEMIA: Chronic | ICD-10-CM

## 2018-10-23 LAB
ALBUMIN SERPL-MCNC: 3.7 G/DL (ref 3.4–5)
ALBUMIN/GLOB SERPL: 1 {RATIO} (ref 0.8–1.7)
ALP SERPL-CCNC: 217 U/L (ref 45–117)
ALT SERPL-CCNC: 32 U/L (ref 16–61)
AST SERPL-CCNC: 24 U/L (ref 15–37)
BASOPHILS # BLD: 0 K/UL (ref 0–0.1)
BASOPHILS NFR BLD: 0 % (ref 0–2)
BILIRUB DIRECT SERPL-MCNC: 0.1 MG/DL (ref 0–0.2)
BILIRUB SERPL-MCNC: 0.5 MG/DL (ref 0.2–1)
CHOLEST SERPL-MCNC: 88 MG/DL
DIFFERENTIAL METHOD BLD: ABNORMAL
EOSINOPHIL # BLD: 0.1 K/UL (ref 0–0.4)
EOSINOPHIL NFR BLD: 1 % (ref 0–5)
ERYTHROCYTE [DISTWIDTH] IN BLOOD BY AUTOMATED COUNT: 14.3 % (ref 11.6–14.5)
GLOBULIN SER CALC-MCNC: 3.7 G/DL (ref 2–4)
HCT VFR BLD AUTO: 40.7 % (ref 36–48)
HDLC SERPL-MCNC: 21 MG/DL (ref 40–60)
HDLC SERPL: 4.2 {RATIO} (ref 0–5)
HGB BLD-MCNC: 14.1 G/DL (ref 13–16)
LDLC SERPL CALC-MCNC: 51.2 MG/DL (ref 0–100)
LIPID PROFILE,FLP: ABNORMAL
LYMPHOCYTES # BLD: 2.4 K/UL (ref 0.9–3.6)
LYMPHOCYTES NFR BLD: 23 % (ref 21–52)
MCH RBC QN AUTO: 30.7 PG (ref 24–34)
MCHC RBC AUTO-ENTMCNC: 34.6 G/DL (ref 31–37)
MCV RBC AUTO: 88.7 FL (ref 74–97)
MONOCYTES # BLD: 1 K/UL (ref 0.05–1.2)
MONOCYTES NFR BLD: 9 % (ref 3–10)
NEUTS SEG # BLD: 7 K/UL (ref 1.8–8)
NEUTS SEG NFR BLD: 67 % (ref 40–73)
PLATELET # BLD AUTO: 294 K/UL (ref 135–420)
PMV BLD AUTO: 10.6 FL (ref 9.2–11.8)
PROT SERPL-MCNC: 7.4 G/DL (ref 6.4–8.2)
RBC # BLD AUTO: 4.59 M/UL (ref 4.7–5.5)
T4 FREE SERPL-MCNC: 1.5 NG/DL (ref 0.7–1.5)
TRIGL SERPL-MCNC: 79 MG/DL (ref ?–150)
TSH SERPL DL<=0.05 MIU/L-ACNC: 1.92 UIU/ML (ref 0.36–3.74)
VLDLC SERPL CALC-MCNC: 15.8 MG/DL
WBC # BLD AUTO: 10.5 K/UL (ref 4.6–13.2)

## 2018-10-23 PROCEDURE — 80061 LIPID PANEL: CPT | Performed by: INTERNAL MEDICINE

## 2018-10-23 PROCEDURE — 85025 COMPLETE CBC W/AUTO DIFF WBC: CPT | Performed by: INTERNAL MEDICINE

## 2018-10-23 PROCEDURE — 80076 HEPATIC FUNCTION PANEL: CPT | Performed by: INTERNAL MEDICINE

## 2018-10-23 PROCEDURE — 84439 ASSAY OF FREE THYROXINE: CPT | Performed by: INTERNAL MEDICINE

## 2018-10-23 PROCEDURE — 36415 COLL VENOUS BLD VENIPUNCTURE: CPT | Performed by: INTERNAL MEDICINE

## 2018-11-01 ENCOUNTER — OFFICE VISIT (OUTPATIENT)
Dept: VASCULAR SURGERY | Age: 58
End: 2018-11-01

## 2018-11-01 VITALS
DIASTOLIC BLOOD PRESSURE: 82 MMHG | HEART RATE: 76 BPM | WEIGHT: 200 LBS | RESPIRATION RATE: 17 BRPM | SYSTOLIC BLOOD PRESSURE: 136 MMHG | BODY MASS INDEX: 28.63 KG/M2 | HEIGHT: 70 IN

## 2018-11-01 DIAGNOSIS — Z89.612 S/P AKA (ABOVE KNEE AMPUTATION), LEFT (HCC): ICD-10-CM

## 2018-11-01 DIAGNOSIS — I74.09 AORTOILIAC OCCLUSIVE DISEASE (HCC): Primary | ICD-10-CM

## 2018-11-01 DIAGNOSIS — I73.9 PAD (PERIPHERAL ARTERY DISEASE) (HCC): ICD-10-CM

## 2018-11-01 DIAGNOSIS — I65.22 STENOSIS OF LEFT CAROTID ARTERY: ICD-10-CM

## 2018-11-01 NOTE — PROGRESS NOTES
1. Have you been to an emergency room or urgent care clinic since your last visit? NO    Hospitalized since your last visit? If yes, where, when, and reason for visit? NO  2. Have you seen or consulted any other health care providers outside of the [facility] since your last visit including any procedures, health maintenance items. If yes, where, when and reason for visit?  NO

## 2018-11-01 NOTE — PROGRESS NOTES
Nikunj Patiño    Chief Complaint   Patient presents with    Leg Pain       History and Physical    Nikunj Patiño is a 59 y.o. male with complicated vascular history and multiple vascular surgeries. He has history of ax-fem bypass with jump fem-fem bypass and right fem-pop bypass in April of 2017. He developed an abscess over his ax-fem bypass requiring I&D and repair of left CFA pseudoaneurysm in June 2017. He unfortunately had blow out of his left femoral anastomosis and had left CFA ligation with jump bypass from right to left fem-fem bypass in July 2017. Shortly after he later developed infection of left groin with exposed graft and underwent removal of infected graft, Repair of superficial femoral artery, left side; Repair of common femoral artery in July 2017. While at rehab, he developed acute LLE ischemia, infection of right axillofemoral bypass graft and was brought back into the hospital in August 2017 and ultimately requiring left AKA and removal of infected right axillary femoral graft with placement of right axillary to femoral jump bypass. He had gangrenous changes to his right foot and underwent right leg angio in September 2017. He then suffered a fall onto his left stump requiring washout of hematoma and revision of stump later that month. He presents to the office today for his 6 month follow up appointment with studies. He is doing quite well at this point. 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 He does ambulate with a prosthetic. He states that most days he will use crutches to ambulate but that he does ambulate with a cane on occasion. He does still have phantom pains but does not complain of any significant claudication or rest pain of the right leg today. He has no skin changes to the right lower extremity or ulcers. No fever/chills. He also has known right ICA occlusion and mild left ICA stenosis.  He denies any vision changes or stroke like symptoms. He states that his daughter is getting  this month and he is very excited about walking her down the aisle. His follow up studies show right internal carotid artery is occluded. Right vertebral is antegrade. Mild heterogeneous plaque left internal carotid artery with less than 50% stenosis. Left vertebral not visualized. Patent right axillary to right common femoral bypass graft without significant stenosis. Multiphasic signals throughout. Right ankle-brachial index is 0.62. Biphasic flow through right femoropopliteal bypass. No significant stenosis noted in right femoropopliteal bypass. Past Medical History:   Diagnosis Date    Acute blood loss as cause of postoperative anemia 4/4/2017    Anticoagulated on Coumadin     Aortoiliac occlusive disease (HCC) 1/25/2017    Atherosclerosis of native artery of both lower extremities with intermittent claudication ([address] 1/25/2017    Benign hypertensive heart disease with systolic congestive heart failure, NYHA class 2 ([address] 1/26/2015    Carotid artery disease (HCC)     Chronic anemia     Chronic systolic heart failure ([facility])     Chronic ulcer of right foot ([address] 4/4/2017    Chronic ulcer of right heel ([address] 8/8/2017    Coronary artery disease involving native coronary artery of native heart 3/15/2017    Successful stenting of Cx (Xience LESLEY) and RCA (Xience LESLEY) to 0% by Dr. Ana Kasper on 3/15/17.     DDD (degenerative disc disease), lumbar 1/26/2015    Dyslipidemia     Erectile dysfunction 7/5/2016    Euthyroid sick syndrome 4/6/2017    Hereditary peripheral neuropathy 11/15/2016    History of cardioversion 4/18/2017    S/P Synchronized external cardioversion (4/18/2017 - Dr. Magalie Moseley)    History of vitamin D deficiency 4/22/2017    Vitamin D 25-Hydroxy (4/22/2017) = 12.0; Vitamin D 25-Hydroxy (5/26/2017) = 38.7    Infection of vascular bypass graft ([address] 7/24/2017    Left lower extremity    Ischemic cardiomyopathy     Moderate to severe pulmonary hypertension ([facility] [address] 7/23/2017    Paroxysmal atrial fibrillation (HCC)     Peripheral artery disease ([facility] [address] 1/25/2017    Skin ulcer of malleolar area of right ankle ([facility] [address]     Spinal stenosis of lumbar region with radiculopathy 5/4/2015    Dr. Martell Hager     Past Surgical History:   Procedure Laterality Date    CARDIAC CATHETERIZATION  3/8/2017         CARDIAC CATHETERIZATION  3/15/2017         CORONARY STENT SINGLE W/PTCA  3/15/2017         HX ABOVE KNEE AMPUTATION Left 08/18/2017    S/P Left above-the-knee amputation (8/18/2017 - Dr. Saundra Cheadle)    HX ATHERECTOMY Left 06/15/2017    S/P Atherectomy and balloon angioplasty of the left superficial femoral artery and above-knee popliteal artery (6/15/2017 - Dr. Saundra Cheadle)    HX ATHERECTOMY Right 09/07/2017    S/P Atherectomy and balloon angioplasty of the below-the-knee popliteal artery, above-the-knee popliteal artery, tibioperoneal trunk artery and posterior tibial artery (9/7/2017 - Dr. Saundra Cheadle)    HX COLONOSCOPY  07/23/2015    polyps repeat 2020 5 years [address] Right 04/04/2017    S/P Right axillary to bifemoral artery bypass using an 8 mm Propaten graft from the right axilla to the right common femoral artery with a jump femoral-femoral bypass with another 8 mm Propaten external ring bypass; Right common femoral artery, and profunda femoris artery and superficial femoral artery endarterectomy causing an extensive surgery on the right side (4/4/2017 - Dr. Jovan Gomez)    [address] Right 04/07/2017    S/P Cutdown of femoral-femoral bypass, more on the left groin side; Right lower extremity angiography with first-order catheterization (4/7/2017 - Dr. Saundra Cheadle)    HX FEMORAL BYPASS Right 04/10/2017    S/P Right femoral to above-knee popliteal bypass with an 8 mm PTFE graft (4/10/2017 - Dr. Reena Rubio)    HX OTHER SURGICAL Left 07/25/2017    S/P Removal of infected graft; Repair of left superficial femoral artery; Repair of left common femoral artery (7/25/2017 - Dr. Ismael Gee)    HX OTHER SURGICAL Right 06/27/2017    S/P Drainage of right side abscess (6/27/2017 - Dr. Ismael Gee)    HX OTHER SURGICAL Left 07/01/2017    S/P Left groin exploration; Left femoral repair pseudoaneurysm; Left wound washout; Wound VAC placement (7/01/2017 - Dr. Ismael Gee)    HX OTHER SURGICAL Left 07/04/2017    S/P Left common femoral artery ligation; Jump bypass from right to left fem-fem to a more distal superficial femoral artery using 8 mm external ringed Propaten graft; Left groin wound exploration and washout (7/4/2017 - Dr. Ismael Gee)    HX OTHER SURGICAL Right 08/18/2017    S/P Right axillary femoral jump bypass interposition; Removal of infected right axillary femoral bypass;  Wound VAC placement of upper thigh 1.2 cm x 5.2 cm x 1.8 cm and groin 4 cm x 0.5 cm x 0.2 cm (8/18/2017 - Dr. Ismael Gee)     Patient Active Problem List   Diagnosis Code    Benign hypertensive heart disease with systolic congestive heart failure, NYHA class 2 ([facility]) I11.0, I50.20    DDD (degenerative disc disease), lumbar M51.36    Erectile dysfunction N52.9    Spinal stenosis of lumbar region with radiculopathy M48.061, M54.16    Hereditary peripheral neuropathy G60.9    Aortoiliac occlusive disease ([facility] [address] I74.09    Atherosclerosis of native artery of both lower extremities with intermittent claudication ([address] I70.213    Peripheral artery disease ([address] I73.9    Coronary artery disease involving native coronary artery of native heart I25.10    Paroxysmal atrial fibrillation (HCC) I48.0    Acute blood loss as cause of postoperative anemia D62    Impaired mobility and ADLs Z74.09    Ischemic cardiomyopathy I25.5    Chronic systolic heart failure (HCC) I50.22    Carotid artery disease ([facility]) I77.9    Dyslipidemia E78.5    Euthyroid sick syndrome E07.81    Aftercare following surgery of the circulatory system Z48.812    Status post femoral-popliteal bypass surgery Z95.828    History of cardioversion Z98.890    Critical ischemia of lower extremity I99.8    History of vitamin D deficiency Z86.39    Pseudoaneurysm of femoral artery ([facility]) I72.4    Infection of vascular bypass graft ([facility] [address] T82. 7XXA    Chronic anemia D64.9    Chronic ulcer of right heel ([facility]) L97.419    Ischemic rest pain of lower extremity ([facility]) I73.9    Prolonged Q-T interval on ECG R94.31    Status post above knee amputation of left lower extremity ([facility] [address] V13.058    Aftercare for amputation stump Z47.81    Moderate to severe pulmonary hypertension ([facility]) I27.20    Adverse effect of amiodarone T46.2X5A    Skin ulcer of malleolar area of right ankle ([facility]) L97.319     Current Outpatient Medications   Medication Sig Dispense Refill    metoprolol tartrate (LOPRESSOR) 25 mg tablet take 1 tablet by mouth every 12 hours 180 Tab 2    losartan (COZAAR) 100 mg tablet Take 1 Tab by mouth daily. take 1 tablet by mouth once daily 30 Tab 6    furosemide (LASIX) 20 mg tablet 1 tablet daily  Indications: Pulmonary Edema due to Chronic Heart Failure 90 Tab 1    atorvastatin (LIPITOR) 80 mg tablet Take 1 Tab by mouth daily. 90 Tab 2    aspirin 81 mg chewable tablet chew and swallow 1 tablet by mouth once daily WITH BREAKFAST 90 Tab 2    clopidogrel (PLAVIX) 75 mg tab Take 1 Tab by mouth daily. 30 Tab 6    amiodarone (CORDARONE) 200 mg tablet take 1 tablet by mouth once daily INDICATION: VENTRICULAR RATE CONTROL IN ATRIAL FIBRILLATION 90 Tab 3    clotrimazole-betamethasone (LOTRISONE) topical cream Apply thin layer to affected area twice day. 30 g 0    gabapentin (NEURONTIN) 400 mg capsule take 1 capsule by mouth three times a day 270 Cap 0    cholecalciferol (VITAMIN D3) 1,000 unit cap Take  by mouth daily.  magnesium oxide (MAG-OX) 400 mg tablet Take 1 Tab by mouth daily.  80 Tab 3    multivitamin (ONE A DAY) tablet Take 1 Tab by mouth daily.  potassium chloride (KLOR-CON) 20 mEq packet Take 1 Packet by mouth daily. [while on Furosemide]  Indications: hypokalemia prevention 30 Packet 0     Allergies   Allergen Reactions    Morphine Other (comments)     Patient gets confused with morphine. Physical Exam:    Visit Vitals  /82 (BP 1 Location: Left arm, BP Patient Position: Sitting)   Pulse 76   Resp 17   Ht 5' 10\" (1.778 m)   Wt 200 lb (90.7 kg)   BMI 28.70 kg/m²      General: Well-appearing male in no acute distress   HEENT: EOMI, no scleral icterus is noted. Pulmonary: No increased work or breathing is noted. Abdomen: nondistended. Extremities: s/p left AKA. Prosthetic in place today. He is ambulating with crutches. No edema RLE. Warm and well perfused on right. No ulcer. Neuro: Cranial nerves II through XII are grossly intact       Impression and Plan:  Gibson Bobby is a 59 y.o. male with complex vascular history as above. His imaging was reviewed with him in the office today. Bypass grafts are all patent with biphasic flow. He continues to do well and has done remarkably well at maintaining his independence and mobility. Discussed that we will continue to follow him every 6 months with repeat studies. He is certainly understanding to blake the office sooner as needed. Plan was discussed. Patient expresses understanding and agrees. Ari Cat  [phone]        PLEASE NOTE:  This document has been produced using voice recognition software. Unrecognized errors in transcription may be present.

## 2018-11-16 ENCOUNTER — TELEPHONE (OUTPATIENT)
Dept: CARDIOLOGY CLINIC | Age: 58
End: 2018-11-16

## 2018-11-16 DIAGNOSIS — E78.5 DYSLIPIDEMIA: Primary | Chronic | ICD-10-CM

## 2018-11-16 NOTE — TELEPHONE ENCOUNTER
----- Message from Claire Steen DO sent at 10/24/2018 10:55 AM EDT -----  This man's cholesterol looks fine. Please let him know.  ES

## 2018-12-27 ENCOUNTER — OFFICE VISIT (OUTPATIENT)
Dept: FAMILY MEDICINE CLINIC | Age: 58
End: 2018-12-27

## 2018-12-27 VITALS
BODY MASS INDEX: 29.84 KG/M2 | RESPIRATION RATE: 16 BRPM | WEIGHT: 208.4 LBS | SYSTOLIC BLOOD PRESSURE: 128 MMHG | HEART RATE: 63 BPM | DIASTOLIC BLOOD PRESSURE: 84 MMHG | HEIGHT: 70 IN | TEMPERATURE: 97.8 F | OXYGEN SATURATION: 97 %

## 2018-12-27 DIAGNOSIS — I73.9 PERIPHERAL ARTERY DISEASE (HCC): Primary | Chronic | ICD-10-CM

## 2018-12-27 DIAGNOSIS — E78.5 DYSLIPIDEMIA: Chronic | ICD-10-CM

## 2018-12-27 DIAGNOSIS — D64.9 CHRONIC ANEMIA: Chronic | ICD-10-CM

## 2018-12-27 DIAGNOSIS — I50.22 CHRONIC SYSTOLIC HEART FAILURE (HCC): ICD-10-CM

## 2018-12-27 DIAGNOSIS — Z86.39 HISTORY OF VITAMIN D DEFICIENCY: ICD-10-CM

## 2018-12-27 DIAGNOSIS — I48.0 PAROXYSMAL ATRIAL FIBRILLATION (HCC): ICD-10-CM

## 2018-12-27 DIAGNOSIS — I25.10 CORONARY ARTERY DISEASE INVOLVING NATIVE CORONARY ARTERY OF NATIVE HEART WITHOUT ANGINA PECTORIS: ICD-10-CM

## 2018-12-27 NOTE — PROGRESS NOTES
1. Have you been to the ER, urgent care clinic since your last visit? Hospitalized since your last visit? No    2. Have you seen or consulted any other health care providers outside of the [address] [place] [address] since your last visit? Include any pap smears or colon screening.  No    Chief Complaint   Patient presents with    Coronary Artery Disease    Hypertension    Vitamin D Deficiency

## 2018-12-27 NOTE — PATIENT INSTRUCTIONS
DASH Diet: Care Instructions  Your Care Instructions    The DASH diet is an eating plan that can help lower your blood pressure. DASH stands for Dietary Approaches to Stop Hypertension. Hypertension is high blood pressure. The DASH diet focuses on eating foods that are high in calcium, potassium, and magnesium. These nutrients can lower blood pressure. The foods that are highest in these nutrients are fruits, vegetables, low-fat dairy products, nuts, seeds, and legumes. But taking calcium, potassium, and magnesium supplements instead of eating foods that are high in those nutrients does not have the same effect. The DASH diet also includes whole grains, fish, and poultry. The DASH diet is one of several lifestyle changes your doctor may recommend to lower your high blood pressure. Your doctor may also want you to decrease the amount of sodium in your diet. Lowering sodium while following the DASH diet can lower blood pressure even further than just the DASH diet alone. Follow-up care is a key part of your treatment and safety. Be sure to make and go to all appointments, and call your doctor if you are having problems. It's also a good idea to know your test results and keep a list of the medicines you take. How can you care for yourself at home? Following the DASH diet  · Eat 4 to 5 servings of fruit each day. A serving is 1 medium-sized piece of fruit, ½ cup chopped or canned fruit, 1/4 cup dried fruit, or 4 ounces (½ cup) of fruit juice. Choose fruit more often than fruit juice. · Eat 4 to 5 servings of vegetables each day. A serving is 1 cup of lettuce or raw leafy vegetables, ½ cup of chopped or cooked vegetables, or 4 ounces (½ cup) of vegetable juice. Choose vegetables more often than vegetable juice. · Get 2 to 3 servings of low-fat and fat-free dairy each day. A serving is 8 ounces of milk, 1 cup of yogurt, or 1 ½ ounces of cheese. · Eat 6 to 8 servings of grains each day.  A serving is 1 slice of bread, 1 ounce of dry cereal, or ½ cup of cooked rice, pasta, or cooked cereal. Try to choose whole-grain products as much as possible. · Limit lean meat, poultry, and fish to 2 servings each day. A serving is 3 ounces, about the size of a deck of cards. · Eat 4 to 5 servings of nuts, seeds, and legumes (cooked dried beans, lentils, and split peas) each week. A serving is 1/3 cup of nuts, 2 tablespoons of seeds, or ½ cup of cooked beans or peas. · Limit fats and oils to 2 to 3 servings each day. A serving is 1 teaspoon of vegetable oil or 2 tablespoons of salad dressing. · Limit sweets and added sugars to 5 servings or less a week. A serving is 1 tablespoon jelly or jam, ½ cup sorbet, or 1 cup of lemonade. · Eat less than 2,300 milligrams (mg) of sodium a day. If you limit your sodium to 1,500 mg a day, you can lower your blood pressure even more. Tips for success  · Start small. Do not try to make dramatic changes to your diet all at once. You might feel that you are missing out on your favorite foods and then be more likely to not follow the plan. Make small changes, and stick with them. Once those changes become habit, add a few more changes. · Try some of the following:  ? Make it a goal to eat a fruit or vegetable at every meal and at snacks. This will make it easy to get the recommended amount of fruits and vegetables each day. ? Try yogurt topped with fruit and nuts for a snack or healthy dessert. ? Add lettuce, tomato, cucumber, and onion to sandwiches. ? Combine a ready-made pizza crust with low-fat mozzarella cheese and lots of vegetable toppings. Try using tomatoes, squash, spinach, broccoli, carrots, cauliflower, and onions. ? Have a variety of cut-up vegetables with a low-fat dip as an appetizer instead of chips and dip. ? Sprinkle sunflower seeds or chopped almonds over salads. Or try adding chopped walnuts or almonds to cooked vegetables.   ? Try some vegetarian meals using beans and peas. Add garbanzo or kidney beans to salads. Make burritos and tacos with mashed cooney beans or black beans. Where can you learn more? Go to http://yuniel-lyndsay.info/. Enter V336 in the search box to learn more about \"DASH Diet: Care Instructions. \"  Current as of: December 6, 2017  Content Version: 11.8  © 0272-6186 [email or web address]. Care instructions adapted under license by [employer] (which disclaims liability or warranty for this information). If you have questions about a medical condition or this instruction, always ask your healthcare professional. [address] any warranty or liability for your use of this information.

## 2018-12-27 NOTE — PROGRESS NOTES
Nikunj Patiño, 59 y.o.,  male    SUBJECTIVE  Ff-up     PAD bilateral- s/p AKA Left leg and complicated revasculariation (2017). Doing well, R foot ulcerations have resolved. Continues to see vascular surgery q6m and patent flow on recent visit. Fairly good response to neurontin for leg pain. L groin rash he reports to be persistent, no improvement with lotrisone, was referred to derm did not pursue on last visit. Provided #    H/o CAD s/p angioplasty and Afib.h/o of GI bleeding on coumadin  Denies cp, syncope, palpitations, on amiodarone asa/plavix. he is following dr. Matt Tse. Reviewed note, increased cozaar dose to 100 mg, BP looks good today. Considering reevalution need to be on amiodarone. Recent TSH/lipids were wnl. HTN- on BB, arb,  Lasix. Vit d def- h/o, reports no longer taking supplement.      ROS:  See HPI, all others negative        Patient Active Problem List   Diagnosis Code    Benign hypertensive heart disease with systolic congestive heart failure, NYHA class 2 (HCC) I11.0, I50.20    DDD (degenerative disc disease), lumbar M51.36    Erectile dysfunction N52.9    Spinal stenosis of lumbar region with radiculopathy M48.061, M54.16    Hereditary peripheral neuropathy G60.9    Aortoiliac occlusive disease ([address] I74.09    Atherosclerosis of native artery of both lower extremities with intermittent claudication ([address] I70.213    Peripheral artery disease ([address] I73.9    Coronary artery disease involving native coronary artery of native heart I25.10    Paroxysmal atrial fibrillation (HCC) I48.0    Acute blood loss as cause of postoperative anemia D62    Impaired mobility and ADLs Z74.09    Ischemic cardiomyopathy I25.5    Chronic systolic heart failure (HCC) I50.22    Carotid artery disease (HCC) I77.9    Dyslipidemia E78.5    Euthyroid sick syndrome E07.81    Aftercare following surgery of the circulatory system Z48.812    Status post femoral-popliteal bypass surgery Z95.828  History of cardioversion Z98.890    Critical ischemia of lower extremity I99.8    History of vitamin D deficiency Z86.39    Pseudoaneurysm of femoral artery ([facility]) I72.4    Infection of vascular bypass graft ([facility] T82. 7XXA    Chronic anemia D64.9    Chronic ulcer of right heel ([facility]) L97.419    Ischemic rest pain of lower extremity ([facility]) I73.9    Prolonged Q-T interval on ECG R94.31    Status post above knee amputation of left lower extremity ([facility] B25.938    Aftercare for amputation stump Z47.81    Moderate to severe pulmonary hypertension ([facility]) I27.20    Adverse effect of amiodarone T46.2X5A    Skin ulcer of malleolar area of right ankle ([facility]) L97.319       Current Outpatient Medications   Medication Sig Dispense Refill    metoprolol tartrate (LOPRESSOR) 25 mg tablet take 1 tablet by mouth every 12 hours 180 Tab 2    losartan (COZAAR) 100 mg tablet Take 1 Tab by mouth daily. take 1 tablet by mouth once daily 30 Tab 6    furosemide (LASIX) 20 mg tablet 1 tablet daily  Indications: Pulmonary Edema due to Chronic Heart Failure 90 Tab 1    atorvastatin (LIPITOR) 80 mg tablet Take 1 Tab by mouth daily. 90 Tab 2    aspirin 81 mg chewable tablet chew and swallow 1 tablet by mouth once daily WITH BREAKFAST 90 Tab 2    clopidogrel (PLAVIX) 75 mg tab Take 1 Tab by mouth daily. 30 Tab 6    amiodarone (CORDARONE) 200 mg tablet take 1 tablet by mouth once daily INDICATION: VENTRICULAR RATE CONTROL IN ATRIAL FIBRILLATION 90 Tab 3    gabapentin (NEURONTIN) 400 mg capsule take 1 capsule by mouth three times a day 270 Cap 0    multivitamin (ONE A DAY) tablet Take 1 Tab by mouth daily. Allergies   Allergen Reactions    Morphine Other (comments)     Patient gets confused with morphine.        Past Medical History:   Diagnosis Date    Acute blood loss as cause of postoperative anemia 4/4/2017    Anticoagulated on Coumadin     Aortoiliac occlusive disease ([facility] 1/25/2017    Atherosclerosis of native artery of both lower extremities with intermittent claudication ([address] 1/25/2017    Benign hypertensive heart disease with systolic congestive heart failure, NYHA class 2 ([address] 1/26/2015    Carotid artery disease (HCC)     Chronic anemia     Chronic systolic heart failure (HCC)     Chronic ulcer of right foot ([address] 4/4/2017    Chronic ulcer of right heel ([address] 8/8/2017    Coronary artery disease involving native coronary artery of native heart 3/15/2017    Successful stenting of Cx (Xience LESLEY) and RCA (Xience LESLEY) to 0% by Dr. Mj Manuel on 3/15/17.     DDD (degenerative disc disease), lumbar 1/26/2015    Dyslipidemia     Erectile dysfunction 7/5/2016    Euthyroid sick syndrome 4/6/2017    Hereditary peripheral neuropathy 11/15/2016    History of cardioversion 4/18/2017    S/P Synchronized external cardioversion (4/18/2017 - Dr. Dauna Denver)    History of vitamin D deficiency 4/22/2017    Vitamin D 25-Hydroxy (4/22/2017) = 12.0; Vitamin D 25-Hydroxy (5/26/2017) = 38.7    Infection of vascular bypass graft ([address] 7/24/2017    Left lower extremity    Ischemic cardiomyopathy     Moderate to severe pulmonary hypertension ([address] 7/23/2017    Paroxysmal atrial fibrillation (HCC)     Peripheral artery disease ([address] 1/25/2017    Skin ulcer of malleolar area of right ankle ([address]     Spinal stenosis of lumbar region with radiculopathy 5/4/2015    Dr. Mary Morris       Social History     Socioeconomic History    Marital status: LEGALLY      Spouse name: Not on file    Number of children: Not on file    Years of education: Not on file    Highest education level: Not on file   Social Needs    Financial resource strain: Not on file    Food insecurity - worry: Not on file    Food insecurity - inability: Not on file   [employer] needs - medical: Not on file   [employer] needs - non-medical: Not on file   Occupational History    Not on file   Tobacco Use    Smoking status: Former Smoker    Smokeless tobacco: Never Used    Tobacco comment: quit in 2011   Substance and Sexual Activity    Alcohol use: Yes     Alcohol/week: 1.2 oz     Types: 2 Cans of beer per week     Comment: 10 cans of beer a month    Drug use: Yes     Types: Marijuana     Comment: occasionally-last used 4/17    Sexual activity: Yes     Partners: Female   Other Topics Concern    Not on file   Social History Narrative    Not on file       Family History   Problem Relation Age of Onset    Heart Disease Father          OBJECTIVE    Physical Exam:     Visit Vitals  /84 (BP 1 Location: Left arm, BP Patient Position: Sitting)   Pulse 63   Temp 97.8 °F (36.6 °C) (Oral)   Resp 16   Ht 5' 10\" (1.778 m)   Wt 208 lb 6.4 oz (94.5 kg)   SpO2 97%   BMI 29.90 kg/m²       General: alert, in no apparent distress or pain  Neck: supple, no adenopathy palpated  CVS: normal rate, regular rhythm, distinct S1 and S2  Lungs:clear to ausculation bilaterally, no crackles, wheezing or rhonchi noted  Extremities: L AKA  W/ prosthesis  Psych:  mood and affect normal    Results for orders placed or performed during the hospital encounter of 10/23/18   LIPID PANEL   Result Value Ref Range    LIPID PROFILE          Cholesterol, total 88 <200 MG/DL    Triglyceride 79 <150 MG/DL    HDL Cholesterol 21 (L) 40 - 60 MG/DL    LDL, calculated 51.2 0 - 100 MG/DL    VLDL, calculated 15.8 MG/DL    CHOL/HDL Ratio 4.2 0 - 5.0     HEPATIC FUNCTION PANEL   Result Value Ref Range    Protein, total 7.4 6.4 - 8.2 g/dL    Albumin 3.7 3.4 - 5.0 g/dL    Globulin 3.7 2.0 - 4.0 g/dL    A-G Ratio 1.0 0.8 - 1.7      Bilirubin, total 0.5 0.2 - 1.0 MG/DL    Bilirubin, direct 0.1 0.0 - 0.2 MG/DL    Alk.  phosphatase 217 (H) 45 - 117 U/L    AST (SGOT) 24 15 - 37 U/L    ALT (SGPT) 32 16 - 61 U/L   CBC WITH AUTOMATED DIFF   Result Value Ref Range    WBC 10.5 4.6 - 13.2 K/uL    RBC 4.59 (L) 4.70 - 5.50 M/uL    HGB 14.1 13.0 - 16.0 g/dL    HCT 40.7 36.0 - 48.0 %    MCV 88.7 74.0 - 97.0 FL    MCH 30.7 24.0 - 34.0 PG    MCHC 34.6 31.0 - 37.0 g/dL    RDW 14.3 11.6 - 14.5 %    PLATELET 161 863 - 590 K/uL    MPV 10.6 9.2 - 11.8 FL    NEUTROPHILS 67 40 - 73 %    LYMPHOCYTES 23 21 - 52 %    MONOCYTES 9 3 - 10 %    EOSINOPHILS 1 0 - 5 %    BASOPHILS 0 0 - 2 %    ABS. NEUTROPHILS 7.0 1.8 - 8.0 K/UL    ABS. LYMPHOCYTES 2.4 0.9 - 3.6 K/UL    ABS. MONOCYTES 1.0 0.05 - 1.2 K/UL    ABS. EOSINOPHILS 0.1 0.0 - 0.4 K/UL    ABS. BASOPHILS 0.0 0.0 - 0.1 K/UL    DF AUTOMATED     TSH AND FREE T4   Result Value Ref Range    TSH 1.92 0.36 - 3.74 uIU/mL    T4, Free 1.5 0.7 - 1.5 NG/DL   '    ASSESSMENT/PLAN  Diagnoses and all orders for this visit:    PAD (peripheral artery disease) ([facility]  Doing well  S/p L AKA (6953), and complicated revascularization  plavix, asa, statin  Following Vasc surgery and podiatry  Responding to  gabapentin dose of 400 mg tid. Benign hypertensive heart disease with systolic congestive heart failure, NYHA class 2 (HCC)  Appears compensated  Cont lasix, on BB and ARB  Cardiology dr. Anthony Carter following, recent increase in cozaar dose, BP improved     Chronic systolic heart failure ([facility]  Doing well  On bb, arb, diuretic     Coronary artery disease involving native coronary artery of native heart without angina pectoris  S/p stent  On ASA, plavix, BB, ARB, statin  following     Dyslipidemia  LDL goal is <70,   On lipitor 80 mg     Status post above knee amputation of left lower extremity (HCC)    Paroxysmal atrial fibrillation ([facility]  Rate controlled, anticoag is contraindicated due to bleeding  On amiodarone and BB    Vitamin D deficiency  Normal, cont 1000 iu daily    Rash  persistent  advised dermatology consult, # provided    BMI 29  Encourage wt loss    Follow-up Disposition:  Return in about 6 months (around 6/27/2019), or if symptoms worsen or fail to improve. Patient understands plan of care. Patient has provided input and agrees with goals.

## 2019-01-06 NOTE — PROGRESS NOTES
Maxi Goel presents today for a 6 month check-up. He was last seen by Dr. Bishop Wilson on 9/28/18. In Oct. 2018, he had PFTs with [facility] done and it showed mild to moderate reduction with good PO2. Dr. Bishop Wilson wanted to discontinue his amiodarone and an attempt was made to reach him to give him instructions. There was no answer so a message was left for him to call the office. He states that he did not receive the message and therefore, he has continued to take the amiodarone. He is a 59year old male with history of dyslipidemia, dilated non-ischemic cardiomyopathy, systolic heart failure, PVD (s/p left AKA and bilateral carotid disease), paroxysmal atrial fibrillation (s/p cardioversion in April 2017), and CAD (s/p PCI/stent to RCA in March 2017 with Xience LESLEY). His last echo was done in Oct. 2017 and it showed an EF of 55%, hypokinesis of the basal inferior wall, and atrial septum bows from left to right, consistent with increased left atrial pressure. His last cardiac catheterization was performed in March 2017. Overall, he states that he has been feeling well and offers no cardiac complaints. Denies chest pain, tightness, heaviness, and palpitations. Denies shortness of breath at rest, dyspnea on exertion, orthopnea and PND. Denies abdominal bloating. Denies lightheadedness, dizziness, and syncope. Denies lower extremity edema and claudication. Denies nausea, vomiting, diarrhea, melena, hematochezia. Denies hematuria, urgency, frequency. Denies fever, chills. He reports compliance with follow-up with his healthcare providers.       PMH:  Past Medical History:   Diagnosis Date    Acute blood loss as cause of postoperative anemia 4/4/2017    Anticoagulated on Coumadin     Aortoiliac occlusive disease ([address] 1/25/2017    Atherosclerosis of native artery of both lower extremities with intermittent claudication ([address] 1/25/2017    Benign hypertensive heart disease with systolic congestive heart failure, NYHA class 2 ([address] 1/26/2015    Carotid artery disease (HCC)     Chronic anemia     Chronic systolic heart failure (HCC)     Chronic ulcer of right foot ([address] 4/4/2017    Chronic ulcer of right heel ([address] 8/8/2017    Coronary artery disease involving native coronary artery of native heart 3/15/2017    Successful stenting of Cx (Xience LESLEY) and RCA (Xience LESLEY) to 0% by Dr. Paula Alfaro on 3/15/17.     DDD (degenerative disc disease), lumbar 1/26/2015    Dyslipidemia     Erectile dysfunction 7/5/2016    Euthyroid sick syndrome 4/6/2017    Hereditary peripheral neuropathy 11/15/2016    History of cardioversion 4/18/2017    S/P Synchronized external cardioversion (4/18/2017 - Dr. Evelia Jovel)    History of vitamin D deficiency 4/22/2017    Vitamin D 25-Hydroxy (4/22/2017) = 12.0; Vitamin D 25-Hydroxy (5/26/2017) = 38.7    Infection of vascular bypass graft ([address] 7/24/2017    Left lower extremity    Ischemic cardiomyopathy     Moderate to severe pulmonary hypertension ([address] 7/23/2017    Paroxysmal atrial fibrillation (HCC)     Peripheral artery disease ([address] 1/25/2017    Skin ulcer of malleolar area of right ankle ([address]     Spinal stenosis of lumbar region with radiculopathy 5/4/2015    Dr. Sandi Hull       PSH:  Past Surgical History:   Procedure Laterality Date    CARDIAC CATHETERIZATION  3/8/2017         CARDIAC CATHETERIZATION  3/15/2017         CORONARY STENT SINGLE W/PTCA  3/15/2017         HX ABOVE KNEE AMPUTATION Left 08/18/2017    S/P Left above-the-knee amputation (8/18/2017 - Dr. Fanta High)    HX ATHERECTOMY Left 06/15/2017    S/P Atherectomy and balloon angioplasty of the left superficial femoral artery and above-knee popliteal artery (6/15/2017 - Dr. Fanta High)    HX ATHERECTOMY Right 09/07/2017    S/P Atherectomy and balloon angioplasty of the below-the-knee popliteal artery, above-the-knee popliteal artery, tibioperoneal trunk artery and posterior tibial artery (9/7/2017 - Dr. Laura Caraballo)    HX COLONOSCOPY  07/23/2015    polyps repeat 2020 5 years [address] 94 Right 04/04/2017    S/P Right axillary to bifemoral artery bypass using an 8 mm Propaten graft from the right axilla to the right common femoral artery with a jump femoral-femoral bypass with another 8 mm Propaten external ring bypass; Right common femoral artery, and profunda femoris artery and superficial femoral artery endarterectomy causing an extensive surgery on the right side (4/4/2017 - Dr. Kailey Camara)    [address] Right 04/07/2017    S/P Cutdown of femoral-femoral bypass, more on the left groin side; Right lower extremity angiography with first-order catheterization (4/7/2017 - Dr. Laura Caraballo)    HX FEMORAL BYPASS Right 04/10/2017    S/P Right femoral to above-knee popliteal bypass with an 8 mm PTFE graft (4/10/2017 - Dr. Speedy Lindsay)    HX OTHER SURGICAL Left 07/25/2017    S/P Removal of infected graft; Repair of left superficial femoral artery; Repair of left common femoral artery (7/25/2017 - Dr. Laura Caraballo)    HX OTHER SURGICAL Right 06/27/2017    S/P Drainage of right side abscess (6/27/2017 - Dr. Laura Caraballo)    HX OTHER SURGICAL Left 07/01/2017    S/P Left groin exploration; Left femoral repair pseudoaneurysm; Left wound washout; Wound VAC placement (7/01/2017 - Dr. Laura Caraballo)    HX OTHER SURGICAL Left 07/04/2017    S/P Left common femoral artery ligation; Jump bypass from right to left fem-fem to a more distal superficial femoral artery using 8 mm external ringed Propaten graft; Left groin wound exploration and washout (7/4/2017 - Dr. Laura Caraballo)    HX OTHER SURGICAL Right 08/18/2017    S/P Right axillary femoral jump bypass interposition; Removal of infected right axillary femoral bypass;  Wound VAC placement of upper thigh 1.2 cm x 5.2 cm x 1.8 cm and groin 4 cm x 0.5 cm x 0.2 cm (8/18/2017 - Dr. Tracy Guayama)       MEDS:  Current Outpatient Medications   Medication Sig    metoprolol tartrate (LOPRESSOR) 25 mg tablet take 1 tablet by mouth every 12 hours    losartan (COZAAR) 100 mg tablet Take 1 Tab by mouth daily. take 1 tablet by mouth once daily    furosemide (LASIX) 20 mg tablet 1 tablet daily  Indications: Pulmonary Edema due to Chronic Heart Failure    atorvastatin (LIPITOR) 80 mg tablet Take 1 Tab by mouth daily.  aspirin 81 mg chewable tablet chew and swallow 1 tablet by mouth once daily WITH BREAKFAST    clopidogrel (PLAVIX) 75 mg tab Take 1 Tab by mouth daily.  amiodarone (CORDARONE) 200 mg tablet take 1 tablet by mouth once daily INDICATION: VENTRICULAR RATE CONTROL IN ATRIAL FIBRILLATION    gabapentin (NEURONTIN) 400 mg capsule take 1 capsule by mouth three times a day    multivitamin (ONE A DAY) tablet Take 1 Tab by mouth daily. No current facility-administered medications for this visit. Allergies and Sensitivities:  Allergies   Allergen Reactions    Morphine Other (comments)     Patient gets confused with morphine. Family History:  Family History   Problem Relation Age of Onset    Heart Disease Father        Social History:  He  reports that he has quit smoking. he has never used smokeless tobacco.  He  reports that he drinks about 1.2 oz of alcohol per week. Physical:  Visit Vitals  /86   Pulse 75   Ht 5' 10\" (1.778 m)   Wt 94.3 kg (208 lb)   SpO2 98%   BMI 29.84 kg/m²         Exam:  Neck:  Supple, no JVD, no carotid bruits  CV:  Normal S1 and  S2, no murmurs, rubs, or gallops noted  Lungs:  Clear to ausculation throughout, no wheezes or rales  Abd:  Soft, non-tender, non-distended with good bowel sounds. No hepatosplenomegaly  Extremities:  No edema in right leg.   Left AKA with prosthesis      Data:  EKG:   Read by Sury Gleason, DO - Sinus rhythm.  Left atrial enlargement.  Poor R-wave progression, may be secondary to pulmonary disease, consider old anterior infarct.  Nonspecific ST depression, nondiagnostic.  Low voltage with rightward P-axis and rotation, possible pulmonary disease      LABS:  Lab Results   Component Value Date/Time    Sodium 143 06/21/2018 07:46 AM    Potassium 4.4 06/21/2018 07:46 AM    Chloride 111 (H) 06/21/2018 07:46 AM    CO2 23 06/21/2018 07:46 AM    Glucose 96 06/21/2018 07:46 AM    BUN 18 06/21/2018 07:46 AM    Creatinine 1.11 06/21/2018 07:46 AM     Lab Results   Component Value Date/Time    Cholesterol, total 88 10/23/2018 10:05 AM    HDL Cholesterol 21 (L) 10/23/2018 10:05 AM    LDL, calculated 51.2 10/23/2018 10:05 AM    Triglyceride 79 10/23/2018 10:05 AM    CHOL/HDL Ratio 4.2 10/23/2018 10:05 AM     Lab Results   Component Value Date/Time    ALT (SGPT) 32 10/23/2018 10:05 AM         Impression/Plan:  1.  CAD, s/p PCI/stent to RCA in March 2017 with Xience LESLEY  2. Essential hypertension, blood pressure stable  3. Hyperlipidemia, on atorvastatin 80mg  4. Chronic diastolic heart failure, appears compensated  5. Dilated, non-ischemic cardiomyopathy  6. PVD, s/p left AKA and bilateral carotid disease    Mr. Sari Peterson was seen today for a 6 month check-up. He was last seen by Dr. Melissa Montano in late Sept. 2018 which was only 3 months ago. He offers no cardiac complaints and states that he has been feeling well. He did not receive the message left for him in October 2018 to call the office to discuss PFT results. He had mild to moderate reduction in the [facility] and Dr. Melissa Montano wanted to discontinue his amiodarone. Because he did not receive the message, he has continued to take the amiodarone. He was instructed to discontinue the amiodarone beginning tomorrow as he has taken today's dose already. All other medications to remain the same. He was asked to call the office if he notices any irregular or rapid heart beats.   Rationale for discontinuing the amiodarone discussed wth him and he verbalized his understanding. He will follow-up with Dr. Rolf Royal as scheduled and as needed. Raymundo Matute MSN, FNP-BC    Please note:  Portions of this chart were created with Dragon medical speech to text program.  Unrecognized errors may be present.

## 2019-01-08 ENCOUNTER — OFFICE VISIT (OUTPATIENT)
Dept: CARDIOLOGY CLINIC | Age: 59
End: 2019-01-08

## 2019-01-08 VITALS
HEART RATE: 75 BPM | SYSTOLIC BLOOD PRESSURE: 142 MMHG | WEIGHT: 208 LBS | BODY MASS INDEX: 29.78 KG/M2 | OXYGEN SATURATION: 98 % | HEIGHT: 70 IN | DIASTOLIC BLOOD PRESSURE: 86 MMHG

## 2019-01-08 DIAGNOSIS — I50.20 BENIGN HYPERTENSIVE HEART DISEASE WITH SYSTOLIC CONGESTIVE HEART FAILURE, NYHA CLASS 2 (HCC): Chronic | ICD-10-CM

## 2019-01-08 DIAGNOSIS — I70.213 ATHEROSCLEROSIS OF NATIVE ARTERY OF BOTH LOWER EXTREMITIES WITH INTERMITTENT CLAUDICATION (HCC): Primary | Chronic | ICD-10-CM

## 2019-01-08 DIAGNOSIS — I73.9 PERIPHERAL ARTERY DISEASE (HCC): Chronic | ICD-10-CM

## 2019-01-08 DIAGNOSIS — I74.09 AORTOILIAC OCCLUSIVE DISEASE (HCC): Chronic | ICD-10-CM

## 2019-01-08 DIAGNOSIS — I11.0 BENIGN HYPERTENSIVE HEART DISEASE WITH SYSTOLIC CONGESTIVE HEART FAILURE, NYHA CLASS 2 (HCC): Chronic | ICD-10-CM

## 2019-01-08 NOTE — PATIENT INSTRUCTIONS
Discontinue amiodarone  All other medications to remain the same  Follow-up with Dr. Chel Medley as scheduled and as needed  If you notice irregular heart beats or rapid heart beats, please call the office

## 2019-03-21 DIAGNOSIS — I11.0 BENIGN HYPERTENSIVE HEART DISEASE WITH SYSTOLIC CONGESTIVE HEART FAILURE, NYHA CLASS 2 (HCC): Chronic | ICD-10-CM

## 2019-03-21 DIAGNOSIS — I50.22 CHRONIC SYSTOLIC HEART FAILURE (HCC): Chronic | ICD-10-CM

## 2019-03-21 DIAGNOSIS — I50.20 BENIGN HYPERTENSIVE HEART DISEASE WITH SYSTOLIC CONGESTIVE HEART FAILURE, NYHA CLASS 2 (HCC): Chronic | ICD-10-CM

## 2019-03-21 NOTE — TELEPHONE ENCOUNTER
This patient contacted office for the following prescriptions to be filled:    Medication requested :   Requested Prescriptions     Pending Prescriptions Disp Refills    furosemide (LASIX) 20 mg tablet 90 Tab 1     Si tablet daily  Indications: Fluid in the Lungs due to Chronic Heart Failure     PCP: sushila   Pharmacy or Print: [facility]  Mail order or Local pharmacy [phone]    Scheduled appointment if not seen by current providers in office: [address]

## 2019-03-25 DIAGNOSIS — I50.20 BENIGN HYPERTENSIVE HEART DISEASE WITH SYSTOLIC CONGESTIVE HEART FAILURE, NYHA CLASS 2 (HCC): Chronic | ICD-10-CM

## 2019-03-25 DIAGNOSIS — I50.22 CHRONIC SYSTOLIC HEART FAILURE (HCC): Chronic | ICD-10-CM

## 2019-03-25 DIAGNOSIS — I11.0 BENIGN HYPERTENSIVE HEART DISEASE WITH SYSTOLIC CONGESTIVE HEART FAILURE, NYHA CLASS 2 (HCC): Chronic | ICD-10-CM

## 2019-03-25 RX ORDER — FUROSEMIDE 20 MG/1
TABLET ORAL
Qty: 90 TAB | Refills: 1 | Status: SHIPPED | OUTPATIENT
Start: 2019-03-25 | End: 2019-09-11 | Stop reason: SDUPTHER

## 2019-03-27 RX ORDER — FUROSEMIDE 20 MG/1
TABLET ORAL
Qty: 90 TAB | Refills: 1 | OUTPATIENT
Start: 2019-03-27

## 2019-04-30 DIAGNOSIS — Z89.612 S/P AKA (ABOVE KNEE AMPUTATION), LEFT (HCC): ICD-10-CM

## 2019-04-30 DIAGNOSIS — I73.9 PAD (PERIPHERAL ARTERY DISEASE) (HCC): ICD-10-CM

## 2019-04-30 DIAGNOSIS — I74.09 AORTOILIAC OCCLUSIVE DISEASE (HCC): Primary | ICD-10-CM

## 2019-05-01 ENCOUNTER — HOSPITAL ENCOUNTER (OUTPATIENT)
Dept: LAB | Age: 59
Discharge: HOME | End: 2019-05-01
Payer: COMMERCIAL

## 2019-05-01 ENCOUNTER — OFFICE VISIT (OUTPATIENT)
Dept: VASCULAR SURGERY | Age: 59
End: 2019-05-01

## 2019-05-01 VITALS
HEIGHT: 70 IN | SYSTOLIC BLOOD PRESSURE: 150 MMHG | DIASTOLIC BLOOD PRESSURE: 90 MMHG | HEART RATE: 80 BPM | WEIGHT: 208 LBS | BODY MASS INDEX: 29.78 KG/M2

## 2019-05-01 DIAGNOSIS — I73.9 PERIPHERAL ARTERY DISEASE (HCC): Chronic | ICD-10-CM

## 2019-05-01 DIAGNOSIS — I65.22 STENOSIS OF LEFT CAROTID ARTERY: ICD-10-CM

## 2019-05-01 DIAGNOSIS — Z86.39 HISTORY OF VITAMIN D DEFICIENCY: ICD-10-CM

## 2019-05-01 DIAGNOSIS — D64.9 CHRONIC ANEMIA: Chronic | ICD-10-CM

## 2019-05-01 DIAGNOSIS — E78.5 DYSLIPIDEMIA: Chronic | ICD-10-CM

## 2019-05-01 DIAGNOSIS — I73.9 PAD (PERIPHERAL ARTERY DISEASE) (HCC): ICD-10-CM

## 2019-05-01 DIAGNOSIS — Z89.612 S/P AKA (ABOVE KNEE AMPUTATION), LEFT (HCC): ICD-10-CM

## 2019-05-01 DIAGNOSIS — I74.09 AORTOILIAC OCCLUSIVE DISEASE (HCC): Primary | ICD-10-CM

## 2019-05-01 LAB
25(OH)D3 SERPL-MCNC: 32.4 NG/ML (ref 30–100)
ALBUMIN SERPL-MCNC: 3.7 G/DL (ref 3.4–5)
ALBUMIN/GLOB SERPL: 1 {RATIO} (ref 0.8–1.7)
ALP SERPL-CCNC: 173 U/L (ref 45–117)
ALT SERPL-CCNC: 27 U/L (ref 16–61)
ANION GAP SERPL CALC-SCNC: 8 MMOL/L (ref 3–18)
AST SERPL-CCNC: 22 U/L (ref 15–37)
BASOPHILS # BLD: 0 K/UL (ref 0–0.1)
BASOPHILS NFR BLD: 0 % (ref 0–2)
BILIRUB DIRECT SERPL-MCNC: 0.1 MG/DL (ref 0–0.2)
BILIRUB SERPL-MCNC: 0.4 MG/DL (ref 0.2–1)
BUN SERPL-MCNC: 15 MG/DL (ref 7–18)
BUN/CREAT SERPL: 15 (ref 12–20)
CALCIUM SERPL-MCNC: 8.8 MG/DL (ref 8.5–10.1)
CHLORIDE SERPL-SCNC: 111 MMOL/L (ref 100–108)
CHOLEST SERPL-MCNC: 93 MG/DL
CO2 SERPL-SCNC: 22 MMOL/L (ref 21–32)
CREAT SERPL-MCNC: 1.03 MG/DL (ref 0.6–1.3)
DIFFERENTIAL METHOD BLD: ABNORMAL
EOSINOPHIL # BLD: 0.2 K/UL (ref 0–0.4)
EOSINOPHIL NFR BLD: 2 % (ref 0–5)
ERYTHROCYTE [DISTWIDTH] IN BLOOD BY AUTOMATED COUNT: 15.5 % (ref 11.6–14.5)
GLOBULIN SER CALC-MCNC: 3.6 G/DL (ref 2–4)
GLUCOSE SERPL-MCNC: 96 MG/DL (ref 74–99)
HCT VFR BLD AUTO: 43.2 % (ref 36–48)
HDLC SERPL-MCNC: 22 MG/DL (ref 40–60)
HDLC SERPL: 4.2 {RATIO} (ref 0–5)
HGB BLD-MCNC: 15 G/DL (ref 13–16)
LDLC SERPL CALC-MCNC: 53 MG/DL (ref 0–100)
LIPID PROFILE,FLP: ABNORMAL
LYMPHOCYTES # BLD: 2.4 K/UL (ref 0.9–3.6)
LYMPHOCYTES NFR BLD: 27 % (ref 21–52)
MCH RBC QN AUTO: 30.7 PG (ref 24–34)
MCHC RBC AUTO-ENTMCNC: 34.7 G/DL (ref 31–37)
MCV RBC AUTO: 88.3 FL (ref 74–97)
MONOCYTES # BLD: 0.9 K/UL (ref 0.05–1.2)
MONOCYTES NFR BLD: 10 % (ref 3–10)
NEUTS SEG # BLD: 5.5 K/UL (ref 1.8–8)
NEUTS SEG NFR BLD: 61 % (ref 40–73)
PLATELET # BLD AUTO: 281 K/UL (ref 135–420)
PMV BLD AUTO: 10 FL (ref 9.2–11.8)
POTASSIUM SERPL-SCNC: 4.3 MMOL/L (ref 3.5–5.5)
PROT SERPL-MCNC: 7.3 G/DL (ref 6.4–8.2)
RBC # BLD AUTO: 4.89 M/UL (ref 4.7–5.5)
SODIUM SERPL-SCNC: 141 MMOL/L (ref 136–145)
TRIGL SERPL-MCNC: 90 MG/DL (ref ?–150)
VLDLC SERPL CALC-MCNC: 18 MG/DL
WBC # BLD AUTO: 9 K/UL (ref 4.6–13.2)

## 2019-05-01 PROCEDURE — 80076 HEPATIC FUNCTION PANEL: CPT

## 2019-05-01 PROCEDURE — 85025 COMPLETE CBC W/AUTO DIFF WBC: CPT

## 2019-05-01 PROCEDURE — 36415 COLL VENOUS BLD VENIPUNCTURE: CPT

## 2019-05-01 PROCEDURE — 82306 VITAMIN D 25 HYDROXY: CPT

## 2019-05-01 PROCEDURE — 80061 LIPID PANEL: CPT

## 2019-05-01 PROCEDURE — 80048 BASIC METABOLIC PNL TOTAL CA: CPT
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
## 2019-05-01 NOTE — PROGRESS NOTES
1. Have you been to an emergency room or urgent care clinic since your last visit? NO    Hospitalized since your last visit? If yes, where, when, and reason for visit? NO  2. Have you seen or consulted any other health care providers outside of the [facility] since your last visit including any procedures, health maintenance items. If yes, where, when and reason for visit?  NO

## 2019-05-02 NOTE — PROGRESS NOTES
These lab were ordered by Arianne Guzman in December; patient canceled last two office visit in March and April.

## 2019-05-03 RX ORDER — LOSARTAN POTASSIUM 100 MG/1
TABLET ORAL
Qty: 30 TAB | Refills: 6 | Status: SHIPPED | OUTPATIENT
Start: 2019-05-03 | End: 2019-11-06 | Stop reason: SDUPTHER

## 2019-05-09 ENCOUNTER — TELEPHONE (OUTPATIENT)
Dept: CARDIOLOGY CLINIC | Age: 59
End: 2019-05-09

## 2019-05-09 NOTE — TELEPHONE ENCOUNTER
----- Message from Yuko Lopez DO sent at 5/4/2019  1:31 PM EDT -----  This looks fine. Please let the patient know.  ES

## 2019-06-27 ENCOUNTER — OFFICE VISIT (OUTPATIENT)
Dept: FAMILY MEDICINE CLINIC | Age: 59
End: 2019-06-27

## 2019-06-27 VITALS
SYSTOLIC BLOOD PRESSURE: 138 MMHG | HEART RATE: 68 BPM | DIASTOLIC BLOOD PRESSURE: 84 MMHG | BODY MASS INDEX: 29.63 KG/M2 | TEMPERATURE: 98.2 F | RESPIRATION RATE: 16 BRPM | HEIGHT: 70 IN | WEIGHT: 207 LBS | OXYGEN SATURATION: 98 %

## 2019-06-27 DIAGNOSIS — I48.0 PAROXYSMAL ATRIAL FIBRILLATION (HCC): ICD-10-CM

## 2019-06-27 DIAGNOSIS — I25.10 CORONARY ARTERY DISEASE INVOLVING NATIVE CORONARY ARTERY OF NATIVE HEART WITHOUT ANGINA PECTORIS: ICD-10-CM

## 2019-06-27 DIAGNOSIS — E78.5 DYSLIPIDEMIA: ICD-10-CM

## 2019-06-27 DIAGNOSIS — Z89.612 STATUS POST ABOVE KNEE AMPUTATION OF LEFT LOWER EXTREMITY: ICD-10-CM

## 2019-06-27 DIAGNOSIS — I11.0 BENIGN HYPERTENSIVE HEART DISEASE WITH SYSTOLIC CONGESTIVE HEART FAILURE, NYHA CLASS 2 (HCC): ICD-10-CM

## 2019-06-27 DIAGNOSIS — I73.9 PERIPHERAL ARTERY DISEASE (HCC): Primary | ICD-10-CM

## 2019-06-27 DIAGNOSIS — I50.20 BENIGN HYPERTENSIVE HEART DISEASE WITH SYSTOLIC CONGESTIVE HEART FAILURE, NYHA CLASS 2 (HCC): ICD-10-CM

## 2019-06-27 NOTE — PROGRESS NOTES
Nicolas Bernal, 62 y.o.,  male    SUBJECTIVE  Ff-up     PAD bilateral- s/p AKA Left leg and complicated revasculariation (2017). Doing well. Continues to see vascular surgery q6m and patent flow on recent visit. Increased gabapentin dose leg pain. Reviewed labs    H/o CAD s/p angioplasty and Afib.h/o of GI bleeding on coumadin  Denies cp, syncope, palpitations, on amiodarone asa/plavix. he is following dr. Sugey Gomez. Reviewed note NP pagtalunan notes, d/raheel amiodarone with PFT abnormality. HTN- on BB, arb,  Lasix. ROS:  See HPI, all others negative        Patient Active Problem List   Diagnosis Code    Benign hypertensive heart disease with systolic congestive heart failure, NYHA class 2 ([facility]) I11.0, I50.20    DDD (degenerative disc disease), lumbar M51.36    Erectile dysfunction N52.9    Spinal stenosis of lumbar region with radiculopathy M48.061, M54.16    Hereditary peripheral neuropathy G60.9    Aortoiliac occlusive disease ([address] I74.09    Atherosclerosis of native artery of both lower extremities with intermittent claudication ([address] I70.213    Peripheral artery disease ([address] I73.9    Coronary artery disease involving native coronary artery of native heart I25.10    Paroxysmal atrial fibrillation ([facility]) I48.0    Acute blood loss as cause of postoperative anemia D62    Impaired mobility and ADLs Z74.09    Ischemic cardiomyopathy I25.5    Chronic systolic heart failure ([facility]) I50.22    Carotid artery disease ([facility]) I77.9    Dyslipidemia E78.5    Euthyroid sick syndrome E07.81    Aftercare following surgery of the circulatory system Z48.812    Status post femoral-popliteal bypass surgery Z95.828    History of cardioversion Z98.890    Critical ischemia of lower extremity I99.8    History of vitamin D deficiency Z86.39    Pseudoaneurysm of femoral artery ([facility]) I72.4    Infection of vascular bypass graft ([address] T82. 7XXA    Chronic anemia D64.9    Chronic ulcer of right heel ([address] L97.419    Ischemic rest pain of lower extremity ([facility]) I73.9    Prolonged Q-T interval on ECG R94.31    Status post above knee amputation of left lower extremity ([address] I46.995    Aftercare for amputation stump Z47.81    Moderate to severe pulmonary hypertension ([facility]) I27.20    Adverse effect of amiodarone T46.2X5A    Skin ulcer of malleolar area of right ankle ([facility]) L97.319       Current Outpatient Medications   Medication Sig Dispense Refill    aspirin 81 mg chewable tablet chew and swallow 1 tablet by mouth once daily WITH BREAKFAST 90 Tab 3    atorvastatin (LIPITOR) 80 mg tablet take 1 tablet by mouth once daily 90 Tab 3    losartan (COZAAR) 100 mg tablet take 1 tablet by mouth once daily 30 Tab 6    furosemide (LASIX) 20 mg tablet take 1 tablet by mouth once daily for PULMONARY EDEMA DUE TO CHRONIC HEART FAILURE 90 Tab 1    metoprolol tartrate (LOPRESSOR) 25 mg tablet take 1 tablet by mouth every 12 hours 180 Tab 2    clopidogrel (PLAVIX) 75 mg tab Take 1 Tab by mouth daily. 30 Tab 6    gabapentin (NEURONTIN) 400 mg capsule take 1 capsule by mouth three times a day 270 Cap 0    multivitamin (ONE A DAY) tablet Take 1 Tab by mouth daily. Allergies   Allergen Reactions    Morphine Other (comments)     Patient gets confused with morphine.        Past Medical History:   Diagnosis Date    Acute blood loss as cause of postoperative anemia 4/4/2017    Anticoagulated on Coumadin     Aortoiliac occlusive disease (HCC) 1/25/2017    Atherosclerosis of native artery of both lower extremities with intermittent claudication ([address] 1/25/2017    Benign hypertensive heart disease with systolic congestive heart failure, NYHA class 2 ([address] 1/26/2015    Carotid artery disease (HCC)     Chronic anemia     Chronic systolic heart failure (HCC)     Chronic ulcer of right foot ([address] 4/4/2017    Chronic ulcer of right heel ([address] 8/8/2017    Coronary artery disease involving native coronary artery of native heart 3/15/2017    Successful stenting of Cx (Xience LESLEY) and RCA (Xience LESLEY) to 0% by Dr. Luc Lees on 3/15/17.  DDD (degenerative disc disease), lumbar 1/26/2015    Dyslipidemia     Erectile dysfunction 7/5/2016    Euthyroid sick syndrome 4/6/2017    Hereditary peripheral neuropathy 11/15/2016    History of cardioversion 4/18/2017    S/P Synchronized external cardioversion (4/18/2017 - Dr. Orion Sales)    History of vitamin D deficiency 4/22/2017    Vitamin D 25-Hydroxy (4/22/2017) = 12.0; Vitamin D 25-Hydroxy (5/26/2017) = 38.7    Infection of vascular bypass graft ([facility] 7/24/2017    Left lower extremity    Ischemic cardiomyopathy     Moderate to severe pulmonary hypertension ([facility] 7/23/2017    Paroxysmal atrial fibrillation (HCC)     Peripheral artery disease ([facility] 1/25/2017    Skin ulcer of malleolar area of right ankle ([facility]     Spinal stenosis of lumbar region with radiculopathy 5/4/2015    Dr. Maurilio Li       Social History     Socioeconomic History    Marital status: LEGALLY      Spouse name: Not on file    Number of children: Not on file    Years of education: Not on file    Highest education level: Not on file   Occupational History    Not on file   Social Needs    Financial resource strain: Not on file    Food insecurity:     Worry: Not on file     Inability: Not on file    Transportation needs:     Medical: Not on file     Non-medical: Not on file   Tobacco Use    Smoking status: Former Smoker    Smokeless tobacco: Never Used    Tobacco comment: quit in 2011   Substance and Sexual Activity    Alcohol use:  Yes     Alcohol/week: 1.2 oz     Types: 2 Cans of beer per week     Comment: 10 cans of beer a month    Drug use: Yes     Types: Marijuana     Comment: occasionally-last used 4/17    Sexual activity: Yes     Partners: Female   Lifestyle    Physical activity:     Days per week: Not on file     Minutes per session: Not on file    Stress: Not on file Relationships    Social connections:     Talks on phone: Not on file     Gets together: Not on file     Attends [employer] service: Not on file     Active member of club or organization: Not on file     Attends meetings of clubs or organizations: Not on file     Relationship status: Not on file    Intimate partner violence:     Fear of current or ex partner: Not on file     Emotionally abused: Not on file     Physically abused: Not on file     Forced sexual activity: Not on file   Other Topics Concern    Not on file   Social History Narrative    Not on file       Family History   Problem Relation Age of Onset    Heart Disease Father          OBJECTIVE    Physical Exam:     Visit Vitals  /84 (BP 1 Location: Left arm, BP Patient Position: Sitting)   Pulse 68   Temp 98.2 °F (36.8 °C) (Oral)   Resp 16   Ht 5' 10\" (1.778 m)   Wt 207 lb (93.9 kg)   SpO2 98%   BMI 29.70 kg/m²       General: alert, in no apparent distress or pain  Neck: supple, no adenopathy palpated  CVS: normal rate, regular rhythm, distinct S1 and S2  Lungs:clear to ausculation bilaterally, no crackles, wheezing or rhonchi noted  Extremities: L AKA  W/ prosthesis  Psych:  mood and affect normal    Results for orders placed or performed during the hospital encounter of 05/01/19   LIPID PANEL   Result Value Ref Range    LIPID PROFILE          Cholesterol, total 93 <200 MG/DL    Triglyceride 90 <150 MG/DL    HDL Cholesterol 22 (L) 40 - 60 MG/DL    LDL, calculated 53 0 - 100 MG/DL    VLDL, calculated 18 MG/DL    CHOL/HDL Ratio 4.2 0 - 5.0     HEPATIC FUNCTION PANEL   Result Value Ref Range    Protein, total 7.3 6.4 - 8.2 g/dL    Albumin 3.7 3.4 - 5.0 g/dL    Globulin 3.6 2.0 - 4.0 g/dL    A-G Ratio 1.0 0.8 - 1.7      Bilirubin, total 0.4 0.2 - 1.0 MG/DL    Bilirubin, direct 0.1 0.0 - 0.2 MG/DL    Alk.  phosphatase 173 (H) 45 - 117 U/L    AST (SGOT) 22 15 - 37 U/L    ALT (SGPT) 27 16 - 61 U/L   '    ASSESSMENT/PLAN  Diagnoses and all orders for this visit:    PAD (peripheral artery disease) ([facility]  Doing well  S/p L AKA (7816), and complicated revascularization  plavix, asa, statin  Following Vasc surgery and podiatry  Recent increase in   gabapentin dose to 1200 mg, 800 mg, 400 mg q8h respectively    Benign hypertensive heart disease with systolic congestive heart failure, NYHA class 2 ([facility]  Appears compensated  Cont lasix, on BB and ARB  Cardiology dr. Elia Elder following     Coronary artery disease involving native coronary artery of native heart without angina pectoris  S/p stent  On ASA, plavix, BB, ARB, statin  following     Dyslipidemia  LDL goal is <70,   On lipitor 80 mg     Status post above knee amputation of left lower extremity ([facility]    Paroxysmal [facility] fibrillation ([facility]  Rate controlled, anticoag is contraindicated due to bleeding  On diltiazem and BB      Follow-up and Dispositions    · Return in about 6 months (around 12/27/2019), or if symptoms worsen or fail to improve. Patient understands plan of care. Patient has provided input and agrees with goals.

## 2019-06-27 NOTE — PROGRESS NOTES
1. Have you been to the ER, urgent care clinic since your last visit? Hospitalized since your last visit? No    2. Have you seen or consulted any other health care providers outside of the [address] [place] [address] since your last visit? Include any pap smears or colon screening.  No

## 2019-06-27 NOTE — PATIENT INSTRUCTIONS
DASH Diet: Care Instructions  Your Care Instructions    The DASH diet is an eating plan that can help lower your blood pressure. DASH stands for Dietary Approaches to Stop Hypertension. Hypertension is high blood pressure. The DASH diet focuses on eating foods that are high in calcium, potassium, and magnesium. These nutrients can lower blood pressure. The foods that are highest in these nutrients are fruits, vegetables, low-fat dairy products, nuts, seeds, and legumes. But taking calcium, potassium, and magnesium supplements instead of eating foods that are high in those nutrients does not have the same effect. The DASH diet also includes whole grains, fish, and poultry. The DASH diet is one of several lifestyle changes your doctor may recommend to lower your high blood pressure. Your doctor may also want you to decrease the amount of sodium in your diet. Lowering sodium while following the DASH diet can lower blood pressure even further than just the DASH diet alone. Follow-up care is a key part of your treatment and safety. Be sure to make and go to all appointments, and call your doctor if you are having problems. It's also a good idea to know your test results and keep a list of the medicines you take. How can you care for yourself at home? Following the DASH diet  · Eat 4 to 5 servings of fruit each day. A serving is 1 medium-sized piece of fruit, ½ cup chopped or canned fruit, 1/4 cup dried fruit, or 4 ounces (½ cup) of fruit juice. Choose fruit more often than fruit juice. · Eat 4 to 5 servings of vegetables each day. A serving is 1 cup of lettuce or raw leafy vegetables, ½ cup of chopped or cooked vegetables, or 4 ounces (½ cup) of vegetable juice. Choose vegetables more often than vegetable juice. · Get 2 to 3 servings of low-fat and fat-free dairy each day. A serving is 8 ounces of milk, 1 cup of yogurt, or 1 ½ ounces of cheese. · Eat 6 to 8 servings of grains each day.  A serving is 1 slice of bread, 1 ounce of dry cereal, or ½ cup of cooked rice, pasta, or cooked cereal. Try to choose whole-grain products as much as possible. · Limit lean meat, poultry, and fish to 2 servings each day. A serving is 3 ounces, about the size of a deck of cards. · Eat 4 to 5 servings of nuts, seeds, and legumes (cooked dried beans, lentils, and split peas) each week. A serving is 1/3 cup of nuts, 2 tablespoons of seeds, or ½ cup of cooked beans or peas. · Limit fats and oils to 2 to 3 servings each day. A serving is 1 teaspoon of vegetable oil or 2 tablespoons of salad dressing. · Limit sweets and added sugars to 5 servings or less a week. A serving is 1 tablespoon jelly or jam, ½ cup sorbet, or 1 cup of lemonade. · Eat less than 2,300 milligrams (mg) of sodium a day. If you limit your sodium to 1,500 mg a day, you can lower your blood pressure even more. Tips for success  · Start small. Do not try to make dramatic changes to your diet all at once. You might feel that you are missing out on your favorite foods and then be more likely to not follow the plan. Make small changes, and stick with them. Once those changes become habit, add a few more changes. · Try some of the following:  ? Make it a goal to eat a fruit or vegetable at every meal and at snacks. This will make it easy to get the recommended amount of fruits and vegetables each day. ? Try yogurt topped with fruit and nuts for a snack or healthy dessert. ? Add lettuce, tomato, cucumber, and onion to sandwiches. ? Combine a ready-made pizza crust with low-fat mozzarella cheese and lots of vegetable toppings. Try using tomatoes, squash, spinach, broccoli, carrots, cauliflower, and onions. ? Have a variety of cut-up vegetables with a low-fat dip as an appetizer instead of chips and dip. ? Sprinkle sunflower seeds or chopped almonds over salads. Or try adding chopped walnuts or almonds to cooked vegetables.   ? Try some vegetarian meals using beans and peas. Add garbanzo or kidney beans to salads. Make burritos and tacos with mashed cooney beans or black beans. Where can you learn more? Go to http://yuniel-lyndsay.info/. Enter H342 in the search box to learn more about \"DASH Diet: Care Instructions. \"  Current as of: July 22, 2018  Content Version: 11.9  © 7055-0144 [employer]. Care instructions adapted under license by [employer] (which disclaims liability or warranty for this information). If you have questions about a medical condition or this instruction, always ask your healthcare professional. [address] any warranty or liability for your use of this information.

## 2019-07-11 RX ORDER — METOPROLOL TARTRATE 25 MG/1
TABLET, FILM COATED ORAL
Qty: 180 TAB | Refills: 0 | Status: SHIPPED | OUTPATIENT
Start: 2019-07-11 | End: 2019-10-03 | Stop reason: SDUPTHER

## 2019-07-23 ENCOUNTER — OFFICE VISIT (OUTPATIENT)
Dept: CARDIOLOGY CLINIC | Age: 59
End: 2019-07-23

## 2019-07-23 VITALS
HEART RATE: 72 BPM | OXYGEN SATURATION: 99 % | HEIGHT: 70 IN | BODY MASS INDEX: 29.49 KG/M2 | SYSTOLIC BLOOD PRESSURE: 132 MMHG | DIASTOLIC BLOOD PRESSURE: 74 MMHG | WEIGHT: 206 LBS

## 2019-07-23 DIAGNOSIS — I70.213 ATHEROSCLEROSIS OF NATIVE ARTERY OF BOTH LOWER EXTREMITIES WITH INTERMITTENT CLAUDICATION (HCC): Primary | ICD-10-CM

## 2019-07-23 DIAGNOSIS — I25.5 ISCHEMIC CARDIOMYOPATHY: ICD-10-CM

## 2019-07-23 DIAGNOSIS — E78.5 DYSLIPIDEMIA: ICD-10-CM

## 2019-07-23 DIAGNOSIS — I50.22 CHRONIC SYSTOLIC HEART FAILURE (HCC): ICD-10-CM

## 2019-07-23 DIAGNOSIS — I48.0 PAROXYSMAL ATRIAL FIBRILLATION (HCC): ICD-10-CM

## 2019-07-23 DIAGNOSIS — I74.09 AORTOILIAC OCCLUSIVE DISEASE (HCC): ICD-10-CM

## 2019-07-23 NOTE — PROGRESS NOTES
HPI: I saw Bailey Khan in my office today in cardiovascular evaluation regarding his problems with a dilated cardiomyopathy and paroxysmal atrial fibrillation.  Mr. Garry Khan is a pleasant 55-year-old  male who has been previously followed by my associate Dr. Sammuel Peabody for a nonischemic cardiomyopathy.  Historically, he has had multiple issues including dyslipidemia peripheral vascular disease status post a left AKA amputation, paroxysmal atrial fibrillation for which he has been on Coumadin in the past and underwent a cardioversion back in April 2017, and coronary artery disease with a cardiac catheterization back on March 8, 2017 demonstrating mild disease except for proximal 50% obstruction and healed 90% trifurcation lesion in the mid circumflex as well as a 50% diffuse proximal and 90% mid right coronary artery obstruction with successful stenting of the circumflex and the RCA on March 15, 2017 using Xience drug-eluting stents.     He comes in today and relates that he is doing reasonably well. He denies any chest pain, shortness of breath or any other cardiovascular complaints at this time. Encounter Diagnoses   Name Primary?  Atherosclerosis of native artery of both lower extremities with intermittent claudication (HCC) Yes    Ischemic cardiomyopathy     Chronic systolic heart failure (HCC)     Aortoiliac occlusive disease (HCC)     Dyslipidemia     Paroxysmal atrial fibrillation (HCC)        Discussion: This patient appears to be doing about as well as we could expect and really of no recommendations for change currently. He is not having any symptoms to suggest the development of symptomatic obstructive coronary disease or heart failure. He was on amiodarone in the past to his paroxysmal atrial fibrillation, but that was discontinued due to abnormal PE LFTs with decreased DCLO's.     He is on high-dose Lipitor 80 mg daily for his cholesterol and his most recent lipid profile was excellent when completed on May 1, 2019 with total cholesterol of 93, triglycerides of 90, HDL of 22, LDL of 53, and VLDL of 18 which I think is excellent control. His blood pressure is well controlled today and his EKG remains stable in sinus rhythm on simply get a plan to see him again in about 6 months. PCP: Helen Manuel MD      Past Medical History:   Diagnosis Date    Acute blood loss as cause of postoperative anemia 4/4/2017    Anticoagulated on Coumadin     Aortoiliac occlusive disease ([address] 1/25/2017    Atherosclerosis of native artery of both lower extremities with intermittent claudication ([address] 1/25/2017    Benign hypertensive heart disease with systolic congestive heart failure, NYHA class 2 ([address] 1/26/2015    Carotid artery disease (HCC)     Chronic anemia     Chronic systolic heart failure (HCC)     Chronic ulcer of right foot ([address] 4/4/2017    Chronic ulcer of right heel ([address] 8/8/2017    Coronary artery disease involving native coronary artery of native heart 3/15/2017    Successful stenting of Cx (Xience LESLEY) and RCA (Xience LESLEY) to 0% by Dr. Matt Scruggs on 3/15/17.     DDD (degenerative disc disease), lumbar 1/26/2015    Dyslipidemia     Erectile dysfunction 7/5/2016    Euthyroid sick syndrome 4/6/2017    Hereditary peripheral neuropathy 11/15/2016    History of cardioversion 4/18/2017    S/P Synchronized external cardioversion (4/18/2017 - Dr. Beatriz Tobin)    History of vitamin D deficiency 4/22/2017    Vitamin D 25-Hydroxy (4/22/2017) = 12.0; Vitamin D 25-Hydroxy (5/26/2017) = 38.7    Infection of vascular bypass graft ([address] 7/24/2017    Left lower extremity    Ischemic cardiomyopathy     Moderate to severe pulmonary hypertension ([address] 7/23/2017    Paroxysmal atrial fibrillation (HCC)     Peripheral artery disease ([address] 1/25/2017    Skin ulcer of malleolar area of right ankle ([address]     Spinal stenosis of lumbar region with radiculopathy 5/4/2015    Dr. Larsen January Elvira Sousa       Past Surgical History:   Procedure Laterality Date    CARDIAC CATHETERIZATION  3/8/2017         CARDIAC CATHETERIZATION  3/15/2017         CORONARY STENT SINGLE W/PTCA  3/15/2017         HX ABOVE KNEE AMPUTATION Left 08/18/2017    S/P Left above-the-knee amputation (8/18/2017 - Dr. Donato Simmons)    HX ATHERECTOMY Left 06/15/2017    S/P Atherectomy and balloon angioplasty of the left superficial femoral artery and above-knee popliteal artery (6/15/2017 - Dr. Donato Simmons)    HX ATHERECTOMY Right 09/07/2017    S/P Atherectomy and balloon angioplasty of the below-the-knee popliteal artery, above-the-knee popliteal artery, tibioperoneal trunk artery and posterior tibial artery (9/7/2017 - Dr. Donato Simmons)    HX COLONOSCOPY  07/23/2015    polyps repeat 2020 5 years Shira Henderson 94 Right 04/04/2017    S/P Right axillary to bifemoral artery bypass using an 8 mm Propaten graft from the right axilla to the right common femoral artery with a jump femoral-femoral bypass with another 8 mm Propaten external ring bypass; Right common femoral artery, and profunda femoris artery and superficial femoral artery endarterectomy causing an extensive surgery on the right side (4/4/2017 - Dr. Ann-Marie Vasquez)    [address] Right 04/07/2017    S/P Cutdown of femoral-femoral bypass, more on the left groin side; Right lower extremity angiography with first-order catheterization (4/7/2017 - Dr. Donato Simmons)    HX FEMORAL BYPASS Right 04/10/2017    S/P Right femoral to above-knee popliteal bypass with an 8 mm PTFE graft (4/10/2017 - Dr. Ariela Corcoran)    HX OTHER SURGICAL Left 07/25/2017    S/P Removal of infected graft; Repair of left superficial femoral artery; Repair of left common femoral artery (7/25/2017 - Dr. Donato Simmons)    HX OTHER SURGICAL Right 06/27/2017    S/P Drainage of right side abscess (6/27/2017 - Dr. Donato Simmons)    HX OTHER SURGICAL Left 07/01/2017    S/P Left groin exploration; Left femoral repair pseudoaneurysm; Left wound washout; Wound VAC placement (7/01/2017 - Dr. Blaine Mcfarlane)    HX OTHER SURGICAL Left 07/04/2017    S/P Left common femoral artery ligation; Jump bypass from right to left fem-fem to a more distal superficial femoral artery using 8 mm external ringed Propaten graft; Left groin wound exploration and washout (7/4/2017 - Dr. Blaine Mcfarlane)    HX OTHER SURGICAL Right 08/18/2017    S/P Right axillary femoral jump bypass interposition; Removal of infected right axillary femoral bypass; Wound VAC placement of upper thigh 1.2 cm x 5.2 cm x 1.8 cm and groin 4 cm x 0.5 cm x 0.2 cm (8/18/2017 - Dr. Blaine Mcfarlane)       Current Outpatient Medications   Medication Sig    metoprolol tartrate (LOPRESSOR) 25 mg tablet take 1 tablet by mouth every 12 hours    aspirin 81 mg chewable tablet chew and swallow 1 tablet by mouth once daily WITH BREAKFAST    atorvastatin (LIPITOR) 80 mg tablet take 1 tablet by mouth once daily    losartan (COZAAR) 100 mg tablet take 1 tablet by mouth once daily    furosemide (LASIX) 20 mg tablet take 1 tablet by mouth once daily for PULMONARY EDEMA DUE TO CHRONIC HEART FAILURE    clopidogrel (PLAVIX) 75 mg tab Take 1 Tab by mouth daily.  gabapentin (NEURONTIN) 400 mg capsule take 1 capsule by mouth three times a day    multivitamin (ONE A DAY) tablet Take 1 Tab by mouth daily. No current facility-administered medications for this visit. Allergies   Allergen Reactions    Morphine Other (comments)     Patient gets confused with morphine. Social History :  Social History     Tobacco Use    Smoking status: Former Smoker    Smokeless tobacco: Never Used    Tobacco comment: quit in 2011   Substance Use Topics    Alcohol use:  Yes     Alcohol/week: 2.0 standard drinks     Types: 2 Cans of beer per week     Comment: 10 cans of beer a month        Family History: family history includes Heart Disease in his father. Review of Systems:  Constitutional: Negative. Respiratory: Negative. Cardiovascular: Negative. Gastrointestinal: Negative. Musculoskeletal: Positive for falls, joint pain and myalgias. Neurological: Negative for dizziness. Physical Exam:    The patient is a cooperative, alert, well developed, well nourished 62 y.o.  male who is in no acute distress at the time of the examination. Visit Vitals  /74   Pulse 72   Ht 5' 10\" (1.778 m)   Wt 93.4 kg (206 lb)   SpO2 99%   BMI 29.56 kg/m²       HEENT: Conjuctiva white, mucosa moist, no pallor or cyanosis. NECK: Supple without masses, tenderness or thyromegaly. There was no jugular venous distention. Carotid are full bilaterally without bruits. CHEST: Symmetrical with good excursion. LUNGS: Clear to auscultation in all fields. HEART: The apex is not displaced. There were no lifts, thrills or heaves. There is a normal S1 and S2 without appreciable murmurs, rubs, clicks, or gallops auscultated. ABDOMEN: Soft without masses, tenderness or organomegaly. EXTREMITIES: 1 + peripheral pulses on the right leg without edema and left AKA. INTEGUMENT: Warm and dry   NEUROLOGICAL: The patient is oriented x 3 with motor function grossly intact. Review of Data: See PMH and Cardiology and Imaging sections for cardiac testing  Lab Results   Component Value Date/Time    Cholesterol, total 93 05/01/2019 06:48 AM    HDL Cholesterol 22 (L) 05/01/2019 06:48 AM    LDL, calculated 53 05/01/2019 06:48 AM    Triglyceride 90 05/01/2019 06:48 AM    CHOL/HDL Ratio 4.2 05/01/2019 06:48 AM       Results for orders placed or performed in visit on 07/23/19   AMB POC EKG ROUTINE W/ 12 LEADS, INTER & REP     Status: None    Narrative    Normal sinus rhythm, rate 72. Borderline left atrial abnormality. There is mild diffuse inferolateral ST changes which are nonspecific.   Compared to the EKG of January 2019 there is little interval change. Brandon Max D.O., CHE. [facility]  [facility]  [address] [facility]. [address]      PLEASE NOTE:  This document has been produced using voice recognition software. Unrecognized errors in transcription may be present.

## 2019-07-29 NOTE — TELEPHONE ENCOUNTER
This pharmacy faxed over request for the following prescriptions to be filled:    Medication requested :   Requested Prescriptions     Pending Prescriptions Disp Refills    clopidogrel (PLAVIX) 75 mg tab 30 Tab 6     Sig: Take 1 Tab by mouth daily.      PCP: [address] or Print: [facility]   Mail order or Local pharmacy [facility]     Scheduled appointment if not seen by current providers in office:  LOV 6/27/2019 f/u 12/31/2019

## 2019-07-30 RX ORDER — CLOPIDOGREL BISULFATE 75 MG/1
75 TABLET ORAL DAILY
Qty: 30 TAB | Refills: 6 | OUTPATIENT
Start: 2019-07-30

## 2019-07-30 RX ORDER — CLOPIDOGREL BISULFATE 75 MG/1
TABLET ORAL
Qty: 30 TAB | Refills: 6 | Status: SHIPPED | OUTPATIENT
Start: 2019-07-30 | End: 2019-12-27 | Stop reason: SDUPTHER

## 2019-07-30 NOTE — TELEPHONE ENCOUNTER
Spoke with [facility] and the are aware they need to contact Dr. Hany Tam office for refills of plavix.

## 2019-09-11 DIAGNOSIS — I50.20 BENIGN HYPERTENSIVE HEART DISEASE WITH SYSTOLIC CONGESTIVE HEART FAILURE, NYHA CLASS 2 (HCC): Chronic | ICD-10-CM

## 2019-09-11 DIAGNOSIS — I11.0 BENIGN HYPERTENSIVE HEART DISEASE WITH SYSTOLIC CONGESTIVE HEART FAILURE, NYHA CLASS 2 (HCC): Chronic | ICD-10-CM

## 2019-09-11 DIAGNOSIS — I50.22 CHRONIC SYSTOLIC HEART FAILURE (HCC): Chronic | ICD-10-CM

## 2019-09-12 RX ORDER — FUROSEMIDE 20 MG/1
TABLET ORAL
Qty: 90 TAB | Refills: 1 | Status: SHIPPED | OUTPATIENT
Start: 2019-09-12 | End: 2019-12-19 | Stop reason: SDUPTHER

## 2019-10-01 ENCOUNTER — OFFICE VISIT (OUTPATIENT)
Dept: VASCULAR SURGERY | Age: 59
End: 2019-10-01

## 2019-10-01 VITALS
RESPIRATION RATE: 18 BRPM | DIASTOLIC BLOOD PRESSURE: 72 MMHG | WEIGHT: 206 LBS | SYSTOLIC BLOOD PRESSURE: 140 MMHG | HEIGHT: 70 IN | BODY MASS INDEX: 29.49 KG/M2

## 2019-10-01 DIAGNOSIS — I74.09 AORTOILIAC OCCLUSIVE DISEASE (HCC): ICD-10-CM

## 2019-10-01 DIAGNOSIS — I73.9 PAD (PERIPHERAL ARTERY DISEASE) (HCC): ICD-10-CM

## 2019-10-01 DIAGNOSIS — R23.8 IRRITATION SYMPTOM OF SKIN: Primary | ICD-10-CM

## 2019-10-01 DIAGNOSIS — Z89.612 S/P AKA (ABOVE KNEE AMPUTATION), LEFT (HCC): ICD-10-CM

## 2019-10-01 RX ORDER — AMOXICILLIN AND CLAVULANATE POTASSIUM 875; 125 MG/1; MG/1
1 TABLET, FILM COATED ORAL EVERY 12 HOURS
Qty: 14 TAB | Refills: 0 | Status: SHIPPED | OUTPATIENT
Start: 2019-10-01 | End: 2020-02-11

## 2019-10-01 NOTE — PROGRESS NOTES
1. Have you been to an emergency room or urgent care clinic since your last visit? NO    Hospitalized since your last visit? If yes, where, when, and reason for visit? No  2. Have you seen or consulted any other health care providers outside of the [facility] since your last visit including any procedures, health maintenance items. If yes, where, when and reason for visit?  NO

## 2019-10-02 NOTE — PROGRESS NOTES
Fadumo Peña    Chief Complaint   Patient presents with    Leg Problem       History and Physical    Fadumo Peña is a 62 y.o. male with complicated vascular history and multiple vascular surgeries. He is s/p left AKA and presents to the office today for a new area of concern on his AKA stump. He states that about a week ago he noticed a small red irritated area on the medial upper thigh. The spot started as what sounds like a small blister or \"in grown hair\". He states the area is raw feeling and irritating but he is not complaining of any \"pain\". He is unsure if this is from his prosthetic rubbing and causing friction. He does also have an area in the left groin which periodically will drain a scant amount of serosanguinous drainage. This is chronic and has been worked up extensively in the past. He denies any fever/chills. Otherwise he is doing well and is without complaints. Past Medical History:   Diagnosis Date    Acute blood loss as cause of postoperative anemia 4/4/2017    Anticoagulated on Coumadin     Aortoiliac occlusive disease (HCC) 1/25/2017    Atherosclerosis of native artery of both lower extremities with intermittent claudication ([address] 1/25/2017    Benign hypertensive heart disease with systolic congestive heart failure, NYHA class 2 ([address] 1/26/2015    Carotid artery disease (HCC)     Chronic anemia     Chronic systolic heart failure (HCC)     Chronic ulcer of right foot ([address] 4/4/2017    Chronic ulcer of right heel ([address] 8/8/2017    Coronary artery disease involving native coronary artery of native heart 3/15/2017    Successful stenting of Cx (Xience LESLEY) and RCA (Xience LESLEY) to 0% by Dr. Caroline Hernandez on 3/15/17.     DDD (degenerative disc disease), lumbar 1/26/2015    Dyslipidemia     Erectile dysfunction 7/5/2016    Euthyroid sick syndrome 4/6/2017    Hereditary peripheral neuropathy 11/15/2016    History of cardioversion 4/18/2017    S/P Synchronized external cardioversion (4/18/2017 - Dr. Mishel Guardado)    History of vitamin D deficiency 4/22/2017    Vitamin D 25-Hydroxy (4/22/2017) = 12.0; Vitamin D 25-Hydroxy (5/26/2017) = 38.7    Infection of vascular bypass graft ([facility] 7/24/2017    Left lower extremity    Ischemic cardiomyopathy     Moderate to severe pulmonary hypertension ([facility] 7/23/2017    Paroxysmal atrial fibrillation ([facility]     Peripheral artery disease ([facility] 1/25/2017    Skin ulcer of malleolar area of right ankle ([facility]     Spinal stenosis of lumbar region with radiculopathy 5/4/2015    Dr. Zoie Crowley     Past Surgical History:   Procedure Laterality Date    CARDIAC CATHETERIZATION  3/8/2017         CARDIAC CATHETERIZATION  3/15/2017         CORONARY STENT SINGLE W/PTCA  3/15/2017         HX ABOVE KNEE AMPUTATION Left 08/18/2017    S/P Left above-the-knee amputation (8/18/2017 - Dr. Gisela Bryant)    HX ATHERECTOMY Left 06/15/2017    S/P Atherectomy and balloon angioplasty of the left superficial femoral artery and above-knee popliteal artery (6/15/2017 - Dr. Gisela Bryant)    HX ATHERECTOMY Right 09/07/2017    S/P Atherectomy and balloon angioplasty of the below-the-knee popliteal artery, above-the-knee popliteal artery, tibioperoneal trunk artery and posterior tibial artery (9/7/2017 - Dr. Gisela Bryant)    HX COLONOSCOPY  07/23/2015    polyps repeat 2020 5 years [address] 94 Right 04/04/2017    S/P Right axillary to bifemoral artery bypass using an 8 mm Propaten graft from the right axilla to the right common femoral artery with a jump femoral-femoral bypass with another 8 mm Propaten external ring bypass; Right common femoral artery, and profunda femoris artery and superficial femoral artery endarterectomy causing an extensive surgery on the right side (4/4/2017 - Dr. Tova Richter)    HX FEMORAL BYPASS Right 04/07/2017    S/P Cutdown of femoral-femoral bypass, more on the left groin side; Right lower extremity angiography with first-order catheterization (4/7/2017 - Dr. Samir Medina)    HX FEMORAL BYPASS Right 04/10/2017    S/P Right femoral to above-knee popliteal bypass with an 8 mm PTFE graft (4/10/2017 - Dr. Mary Ann Lozada)    HX OTHER SURGICAL Left 07/25/2017    S/P Removal of infected graft; Repair of left superficial femoral artery; Repair of left common femoral artery (7/25/2017 - Dr. Samir Medina)    HX OTHER SURGICAL Right 06/27/2017    S/P Drainage of right side abscess (6/27/2017 - Dr. Samir Medina)    HX OTHER SURGICAL Left 07/01/2017    S/P Left groin exploration; Left femoral repair pseudoaneurysm; Left wound washout; Wound VAC placement (7/01/2017 - Dr. Samir Medina)    HX OTHER SURGICAL Left 07/04/2017    S/P Left common femoral artery ligation; Jump bypass from right to left fem-fem to a more distal superficial femoral artery using 8 mm external ringed Propaten graft; Left groin wound exploration and washout (7/4/2017 - Dr. Samir Medina)    HX OTHER SURGICAL Right 08/18/2017    S/P Right axillary femoral jump bypass interposition; Removal of infected right axillary femoral bypass;  Wound VAC placement of upper thigh 1.2 cm x 5.2 cm x 1.8 cm and groin 4 cm x 0.5 cm x 0.2 cm (8/18/2017 - Dr. Samir Medina)     Patient Active Problem List   Diagnosis Code    Benign hypertensive heart disease with systolic congestive heart failure, NYHA class 2 ([facility]) I11.0, I50.20    DDD (degenerative disc disease), lumbar M51.36    Erectile dysfunction N52.9    Spinal stenosis of lumbar region with radiculopathy M48.061, M54.16    Hereditary peripheral neuropathy G60.9    Aortoiliac occlusive disease ([address] I74.09    Atherosclerosis of native artery of both lower extremities with intermittent claudication ([address] I70.213    Peripheral artery disease ([facility] I73.9    Coronary artery disease involving native coronary artery of native heart I25.10    Paroxysmal atrial fibrillation ([facility]) I48.0    Acute blood loss as cause of postoperative anemia D62    Impaired mobility and ADLs Z74.09    Ischemic cardiomyopathy I25.5    Chronic systolic heart failure ([facility]) I50.22    Carotid artery disease ([facility]) I73.9    Dyslipidemia E78.5    Euthyroid sick syndrome E07.81    Aftercare following surgery of the circulatory system Z48.812    Status post femoral-popliteal bypass surgery Z95.828    History of cardioversion Z98.890    Critical ischemia of lower extremity I99.8    History of vitamin D deficiency Z86.39    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 7XXA    Chronic anemia D64.9    Chronic ulcer of right heel ([facility]) L97.419    Ischemic rest pain of lower extremity ([facility]) I73.9    Prolonged Q-T interval on ECG R94.31    Status post above knee amputation of left lower extremity U08.178    Aftercare for amputation stump Z47.81    Moderate to severe pulmonary hypertension ([facility]) I27.20    Adverse effect of amiodarone T46.2X5A    Skin ulcer of malleolar area of right ankle ([facility]) L97.319     Current Outpatient Medications   Medication Sig Dispense Refill    amoxicillin-clavulanate (AUGMENTIN) 875-125 mg per tablet Take 1 Tab by mouth every twelve (12) hours.  14 Tab 0    furosemide (LASIX) 20 mg tablet take 1 tablet by mouth once daily for PULMONARY EDEMA DUE TO CHRONIC HEART FAILURE 90 Tab 1    clopidogrel (PLAVIX) 75 mg tab take 1 tablet by mouth once daily 30 Tab 6    metoprolol tartrate (LOPRESSOR) 25 mg tablet take 1 tablet by mouth every 12 hours 180 Tab 0    aspirin 81 mg chewable tablet chew and swallow 1 tablet by mouth once daily WITH BREAKFAST 90 Tab 3    atorvastatin (LIPITOR) 80 mg tablet take 1 tablet by mouth once daily 90 Tab 3    losartan (COZAAR) 100 mg tablet take 1 tablet by mouth once daily 30 Tab 6    gabapentin (NEURONTIN) 400 mg capsule take 1 capsule by mouth three times a day 270 Cap 0    multivitamin (ONE A DAY) tablet Take 1 Tab by mouth daily. Allergies   Allergen Reactions    Morphine Other (comments)     Patient gets confused with morphine. Physical Exam:    Visit Vitals  /72 (BP 1 Location: Left arm, BP Patient Position: Sitting)   Resp 18   Ht 5' 10\" (1.778 m)   Wt 206 lb (93.4 kg)   BMI 29.56 kg/m²      General: Well-appearing male in no acute distress   HEENT: EOMI, no scleral icterus is noted. Pulmonary: No increased work or breathing is noted. Abdomen: nondistended. Extremities: no RLE edema. Left AKA stump reveals a small ~1/2 cm area on the medial upper thigh which is consistent with irritation and appears to have been a blister which has opened and drained. No surrounding erythema or skin irritation. No drainage on today's exam. He has a chronic appearing superficial skin tract in the groin. No drainage or signs of infection. Neuro: Cranial nerves II through XII are grossly intact     Impression and Plan:  Ayse Hebert is a 62 y.o. male with extensive vascular history. He is status post left AKA. He does have an area of what appears to be irritation to his medial upper thigh on the stump. I discussed that we will treat him with a short course of antibiotics. I asked him to use a topical antibiotic ointment and cover the area with a Band-Aid. We will have him back in 1 week's time to reassess. He is certainly understanding to call the office sooner as needed. He may also benefit from evaluation with his prosthetist to make sure this is not being caused by friction from his prosthetic limb. Plan was discussed. Patient expresses understanding and agrees. [address] [place] [address] PA-C          PLEASE NOTE:  This document has been produced using voice recognition software. Unrecognized errors in transcription may be present.

## 2019-10-08 ENCOUNTER — OFFICE VISIT (OUTPATIENT)
Dept: VASCULAR SURGERY | Age: 59
End: 2019-10-08

## 2019-10-08 VITALS
DIASTOLIC BLOOD PRESSURE: 80 MMHG | BODY MASS INDEX: 29.49 KG/M2 | WEIGHT: 206 LBS | RESPIRATION RATE: 17 BRPM | HEIGHT: 70 IN | SYSTOLIC BLOOD PRESSURE: 130 MMHG

## 2019-10-08 DIAGNOSIS — I74.09 AORTOILIAC OCCLUSIVE DISEASE (HCC): ICD-10-CM

## 2019-10-08 DIAGNOSIS — Z89.612 S/P AKA (ABOVE KNEE AMPUTATION), LEFT (HCC): ICD-10-CM

## 2019-10-08 DIAGNOSIS — R23.8 IRRITATION SYMPTOM OF SKIN: Primary | ICD-10-CM

## 2019-10-08 DIAGNOSIS — I65.22 STENOSIS OF LEFT CAROTID ARTERY: ICD-10-CM

## 2019-10-08 NOTE — PROGRESS NOTES
Travon Milton    Chief Complaint   Patient presents with    Wound Check       History and Physical    Travon Milton is a 62 y.o. male with complicated vascular history and multiple vascular surgeries. He is s/p left AKA. He presents to the office today for wound check. He has a 2 week history of a small red irritated area on the medial left upper thigh. The spot started as what sounds like a small blister or \"in grown hair\". He states the area is raw feeling and irritating but he is not complaining of any \"pain\". He does not feel this is from his prosthetic rubbing or causing friction. He denies any fever/chills. Otherwise he is doing well and is without complaints. He has 2 more days of ABX to complete. He has been covering the area with a Band-Aid and cleaning it with betadine. He feels the area is unchanged. Past Medical History:   Diagnosis Date    Acute blood loss as cause of postoperative anemia 4/4/2017    Anticoagulated on Coumadin     Aortoiliac occlusive disease (HCC) 1/25/2017    Atherosclerosis of native artery of both lower extremities with intermittent claudication ([address] 1/25/2017    Benign hypertensive heart disease with systolic congestive heart failure, NYHA class 2 ([address] 1/26/2015    Carotid artery disease (HCC)     Chronic anemia     Chronic systolic heart failure (HCC)     Chronic ulcer of right foot ([address] 4/4/2017    Chronic ulcer of right heel ([address] 8/8/2017    Coronary artery disease involving native coronary artery of native heart 3/15/2017    Successful stenting of Cx (Xience LESLEY) and RCA (Xience LESLEY) to 0% by Dr. Caryn Mckenna on 3/15/17.     DDD (degenerative disc disease), lumbar 1/26/2015    Dyslipidemia     Erectile dysfunction 7/5/2016    Euthyroid sick syndrome 4/6/2017    Hereditary peripheral neuropathy 11/15/2016    History of cardioversion 4/18/2017    S/P Synchronized external cardioversion (4/18/2017 - Dr. Marilu Domingo)    History of vitamin D deficiency 4/22/2017    Vitamin D 25-Hydroxy (4/22/2017) = 12.0; Vitamin D 25-Hydroxy (5/26/2017) = 38.7    Infection of vascular bypass graft ([facility] 7/24/2017    Left lower extremity    Ischemic cardiomyopathy     Moderate to severe pulmonary hypertension ([facility] 7/23/2017    Paroxysmal atrial fibrillation (HCC)     Peripheral artery disease ([facility] 1/25/2017    Skin ulcer of malleolar area of right ankle ([facility]     Spinal stenosis of lumbar region with radiculopathy 5/4/2015    Dr. Justin Church     Past Surgical History:   Procedure Laterality Date    CARDIAC CATHETERIZATION  3/8/2017         CARDIAC CATHETERIZATION  3/15/2017         CORONARY STENT SINGLE W/PTCA  3/15/2017         HX ABOVE KNEE AMPUTATION Left 08/18/2017    S/P Left above-the-knee amputation (8/18/2017 - Dr. Anne Fry)    HX ATHERECTOMY Left 06/15/2017    S/P Atherectomy and balloon angioplasty of the left superficial femoral artery and above-knee popliteal artery (6/15/2017 - Dr. Anne Fry)    HX ATHERECTOMY Right 09/07/2017    S/P Atherectomy and balloon angioplasty of the below-the-knee popliteal artery, above-the-knee popliteal artery, tibioperoneal trunk artery and posterior tibial artery (9/7/2017 - Dr. Anne Fry)    HX COLONOSCOPY  07/23/2015    polyps repeat 2020 5 years [address] 94 Right 04/04/2017    S/P Right axillary to bifemoral artery bypass using an 8 mm Propaten graft from the right axilla to the right common femoral artery with a jump femoral-femoral bypass with another 8 mm Propaten external ring bypass; Right common femoral artery, and profunda femoris artery and superficial femoral artery endarterectomy causing an extensive surgery on the right side (4/4/2017 - Dr. Satya Roberts)    Ceasar 94 Right 04/07/2017    S/P Cutdown of femoral-femoral bypass, more on the left groin side; Right lower extremity angiography with first-order catheterization (4/7/2017 - Dr. Lionel Ervin)    HX FEMORAL BYPASS Right 04/10/2017    S/P Right femoral to above-knee popliteal bypass with an 8 mm PTFE graft (4/10/2017 - Dr. Derrek Claude)    HX OTHER SURGICAL Left 07/25/2017    S/P Removal of infected graft; Repair of left superficial femoral artery; Repair of left common femoral artery (7/25/2017 - Dr. Lionel Ervin)    HX OTHER SURGICAL Right 06/27/2017    S/P Drainage of right side abscess (6/27/2017 - Dr. Lionel Ervin)    HX OTHER SURGICAL Left 07/01/2017    S/P Left groin exploration; Left femoral repair pseudoaneurysm; Left wound washout; Wound VAC placement (7/01/2017 - Dr. Lionel Ervin)    HX OTHER SURGICAL Left 07/04/2017    S/P Left common femoral artery ligation; Jump bypass from right to left fem-fem to a more distal superficial femoral artery using 8 mm external ringed Propaten graft; Left groin wound exploration and washout (7/4/2017 - Dr. Lionel Ervin)    HX OTHER SURGICAL Right 08/18/2017    S/P Right axillary femoral jump bypass interposition; Removal of infected right axillary femoral bypass;  Wound VAC placement of upper thigh 1.2 cm x 5.2 cm x 1.8 cm and groin 4 cm x 0.5 cm x 0.2 cm (8/18/2017 - Dr. Lionel Ervin)     Patient Active Problem List   Diagnosis Code    Benign hypertensive heart disease with systolic congestive heart failure, NYHA class 2 ([facility]) I11.0, I50.20    DDD (degenerative disc disease), lumbar M51.36    Erectile dysfunction N52.9    Spinal stenosis of lumbar region with radiculopathy M48.061, M54.16    Hereditary peripheral neuropathy G60.9    Aortoiliac occlusive disease ([address] I74.09    Atherosclerosis of native artery of both lower extremities with intermittent claudication ([address] I70.213    Peripheral artery disease ([facility] I73.9    Coronary artery disease involving native coronary artery of native heart I25.10    Paroxysmal atrial fibrillation (HCC) I48.0    Acute blood loss as cause of postoperative anemia D62    Impaired mobility and ADLs Z74.09    Ischemic cardiomyopathy I25.5    Chronic systolic heart failure ([facility]) I50.22    Carotid artery disease ([facility]) I73.9    Dyslipidemia E78.5    Euthyroid sick syndrome E07.81    Aftercare following surgery of the circulatory system Z48.812    Status post femoral-popliteal bypass surgery Z95.828    History of cardioversion Z98.890    Critical ischemia of lower extremity I99.8    History of vitamin D deficiency Z86.39    Pseudoaneurysm of femoral artery ([facility]) I72.4    Infection of vascular bypass graft ([facility]) T82. 7XXA    Chronic anemia D64.9    Chronic ulcer of right heel ([facility]) L97.419    Ischemic rest pain of lower extremity ([facility]) I73.9    Prolonged Q-T interval on ECG R94.31    Status post above knee amputation of left lower extremity O34.395    Aftercare for amputation stump Z47.81    Moderate to severe pulmonary hypertension ([facility]) I27.20    Adverse effect of amiodarone T46.2X5A    Skin ulcer of malleolar area of right ankle ([facility]) L97.319     Current Outpatient Medications   Medication Sig Dispense Refill    metoprolol tartrate (LOPRESSOR) 25 mg tablet take 1 tablet by mouth every 12 hours 180 Tab 1    amoxicillin-clavulanate (AUGMENTIN) 875-125 mg per tablet Take 1 Tab by mouth every twelve (12) hours. 14 Tab 0    furosemide (LASIX) 20 mg tablet take 1 tablet by mouth once daily for PULMONARY EDEMA DUE TO CHRONIC HEART FAILURE 90 Tab 1    clopidogrel (PLAVIX) 75 mg tab take 1 tablet by mouth once daily 30 Tab 6    aspirin 81 mg chewable tablet chew and swallow 1 tablet by mouth once daily WITH BREAKFAST 90 Tab 3    atorvastatin (LIPITOR) 80 mg tablet take 1 tablet by mouth once daily 90 Tab 3    losartan (COZAAR) 100 mg tablet take 1 tablet by mouth once daily 30 Tab 6    gabapentin (NEURONTIN) 400 mg capsule take 1 capsule by mouth three times a day 270 Cap 0    multivitamin (ONE A DAY) tablet Take 1 Tab by mouth daily. Allergies   Allergen Reactions    Morphine Other (comments)     Patient gets confused with morphine. Physical Exam:    Visit Vitals  /80 (BP 1 Location: Left arm, BP Patient Position: Sitting)   Resp 17   Ht 5' 10\" (1.778 m)   Wt 206 lb (93.4 kg)   BMI 29.56 kg/m²      General: Well-appearing male in no acute distress   HEENT: EOMI, no scleral icterus is noted. Pulmonary: No increased work or breathing is noted. Abdomen: nondistended. Extremities: no RLE edema. Left AKA stump reveals a small ~1/2 cm area on the medial upper thigh which is consistent with irritation and appears to have been a blister which has opened and drained. No surrounding erythema or skin irritation. No drainage on today's exam. No drainage or signs of infection. Neuro: Cranial nerves II through XII are grossly intact     Impression and Plan:  Darline Joaquin is a 62 y.o. male with extensive vascular history. He is status post left AKA. He does have an area of what appears to be irritation to his medial upper thigh on the stump. Patient was seen along with Dr Cassandra Canales. We advised him to keep a patch of Duoderm on the area until his follow up next month. He will call the office if there are any changes or worsening symptoms. Otherwise we will see him at his scheduled follow up with studies next month. Plan was discussed. Patient expresses understanding and agrees. [address] [place] [address]          PLEASE NOTE:  This document has been produced using voice recognition software. Unrecognized errors in transcription may be present.

## 2019-10-08 NOTE — PROGRESS NOTES
1. Have you been to an emergency room or urgent care clinic since your last visit? NO    Hospitalized since your last visit? If yes, where, when, and reason for visit? No  2. Have you seen or consulted any other health care providers outside of the [facility] since your last visit including any procedures, health maintenance items. If yes, where, when and reason for visit?  NO

## 2019-11-06 RX ORDER — CLOPIDOGREL BISULFATE 75 MG/1
TABLET ORAL
Qty: 30 TAB | Refills: 6 | Status: CANCELLED | OUTPATIENT
Start: 2019-11-06

## 2019-11-06 NOTE — TELEPHONE ENCOUNTER
This pharmacy faxed over request for the following prescriptions to be filled:    Medication requested :   Requested Prescriptions     Pending Prescriptions Disp Refills    losartan (COZAAR) 100 mg tablet 30 Tab 6    atorvastatin (LIPITOR) 80 mg tablet 90 Tab 3    metoprolol tartrate (LOPRESSOR) 25 mg tablet 180 Tab 1    clopidogrel (PLAVIX) 75 mg tab 30 Tab 6         PCP: [address] [place] [address] or Print: [facility]  Mail order or Local pharmacy Mail Order     Scheduled appointment if not seen by current providers in office: [address] [place] [address] 6/27/2019 f/u 12/31/2019

## 2019-11-07 ENCOUNTER — OFFICE VISIT (OUTPATIENT)
Dept: VASCULAR SURGERY | Age: 59
End: 2019-11-07

## 2019-11-07 VITALS
WEIGHT: 206 LBS | HEART RATE: 90 BPM | HEIGHT: 70 IN | BODY MASS INDEX: 29.49 KG/M2 | RESPIRATION RATE: 16 BRPM | SYSTOLIC BLOOD PRESSURE: 128 MMHG | DIASTOLIC BLOOD PRESSURE: 88 MMHG

## 2019-11-07 DIAGNOSIS — I65.22 STENOSIS OF LEFT CAROTID ARTERY: ICD-10-CM

## 2019-11-07 DIAGNOSIS — Z89.612 S/P AKA (ABOVE KNEE AMPUTATION), LEFT (HCC): ICD-10-CM

## 2019-11-07 DIAGNOSIS — I74.09 AORTOILIAC OCCLUSIVE DISEASE (HCC): Primary | ICD-10-CM

## 2019-11-07 DIAGNOSIS — I65.21 CAROTID OCCLUSION, RIGHT: ICD-10-CM

## 2019-11-07 RX ORDER — LOSARTAN POTASSIUM 100 MG/1
TABLET ORAL
Qty: 90 TAB | Refills: 3 | Status: SHIPPED | OUTPATIENT
Start: 2019-11-07 | End: 2019-11-12 | Stop reason: SDUPTHER

## 2019-11-07 RX ORDER — ATORVASTATIN CALCIUM 80 MG/1
TABLET, FILM COATED ORAL
Qty: 90 TAB | Refills: 3 | Status: SHIPPED | OUTPATIENT
Start: 2019-11-07 | End: 2020-10-19

## 2019-11-07 RX ORDER — METOPROLOL TARTRATE 25 MG/1
TABLET, FILM COATED ORAL
Qty: 180 TAB | Refills: 1 | Status: SHIPPED | OUTPATIENT
Start: 2019-11-07 | End: 2020-04-27

## 2019-11-07 NOTE — PROGRESS NOTES
1. Have you been to an emergency room or urgent care clinic since your last visit?   no  Hospitalized since your last visit? If yes, where, when, and reason for visit?  no   2. Have you seen or consulted any other health care providers outside of the [facility] since your last visit including any procedures, health maintenance items. If yes, where, when and reason for visit?

## 2019-11-07 NOTE — PROGRESS NOTES
Kory Stein    Chief Complaint   Patient presents with    Leg Pain    Carotid Artery Stenosis       History and Physical    Kory Stein is a 62 y.o. male with complicated vascular history and multiple vascular surgeries. He has history of ax-fem bypass with jump fem-fem bypass and right fem-pop bypass in April of 2017. He developed an abscess over his ax-fem bypass requiring I&D and repair of left CFA pseudoaneurysm in June 2017. He unfortunately had blow out of his left femoral anastomosis and had left CFA ligation with jump bypass from right to left fem-fem bypass in July 2017. Shortly after he later developed infection of left groin with exposed graft and underwent removal of infected graft, Repair of superficial femoral artery, left side; Repair of common femoral artery in July 2017. While at rehab, he developed acute LLE ischemia, infection of right axillofemoral bypass graft and was brought back into the hospital in August 2017 and ultimately requiring left AKA and removal of infected right axillary femoral graft with placement of right axillary to femoral jump bypass. He had gangrenous changes to his right foot and underwent right leg angio in September 2017. He then suffered a fall onto his left stump requiring washout of hematoma and revision of stump later that month. He presents to the office today for his 6 month follow up appointment with studies. He is doing quite well at this point. 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 He does ambulate with a prosthetic. He states that he will use crutches to ambulate if he has to go a long distance but otherwise he ambulates with a cane. He does have some intermittent phantom pains which are stable. He takes Gabapentin for his phantom pain. He did have a recent skin infection on his stump which has resolved with local wound care and ABX. He does not complain of any claudication or rest pain of the right leg today.   He has no skin changes to the right lower extremity or ulcers. No fever/chills. He also has known right ICA occlusion and mild left ICA stenosis. He denies any vision changes or stroke like symptoms. His follow up studies show \"Occluded right internal carotid artery. Mild stenosis noted within the left internal carotid artery. No hemodynamically significant arterial disease is identified within the vertebral arteries. Patent right axillary to right common femoral artery bypass without any evidence of hemodynamically significant stenosis. Fluid collection identified within the hip of a smaller size compared to previous. Patient has left above-knee amputation. Moderate arterial disease noted within the right lower extremity at rest. Patient does have fully patent right femoral to popliteal artery bypass without any evidence of hemodynamically significant stenosis\". Past Medical History:   Diagnosis Date    Acute blood loss as cause of postoperative anemia 4/4/2017    Anticoagulated on Coumadin     Aortoiliac occlusive disease (HCC) 1/25/2017    Atherosclerosis of native artery of both lower extremities with intermittent claudication ([address] 1/25/2017    Benign hypertensive heart disease with systolic congestive heart failure, NYHA class 2 ([address] 1/26/2015    Carotid artery disease (HCC)     Chronic anemia     Chronic systolic heart failure (HCC)     Chronic ulcer of right foot ([address] 4/4/2017    Chronic ulcer of right heel ([address] 8/8/2017    Coronary artery disease involving native coronary artery of native heart 3/15/2017    Successful stenting of Cx (Xience LESLEY) and RCA (Xience LESLEY) to 0% by Dr. David Velarde on 3/15/17.     DDD (degenerative disc disease), lumbar 1/26/2015    Dyslipidemia     Erectile dysfunction 7/5/2016    Euthyroid sick syndrome 4/6/2017    Hereditary peripheral neuropathy 11/15/2016    History of cardioversion 4/18/2017    S/P Synchronized external cardioversion (4/18/2017 - Dr. Jori Jovel Mireya)    History of vitamin D deficiency 4/22/2017    Vitamin D 25-Hydroxy (4/22/2017) = 12.0; Vitamin D 25-Hydroxy (5/26/2017) = 38.7    Infection of vascular bypass graft ([facility] 7/24/2017    Left lower extremity    Ischemic cardiomyopathy     Moderate to severe pulmonary hypertension ([facility] 7/23/2017    Paroxysmal atrial fibrillation (HCC)     Peripheral artery disease ([facility] 1/25/2017    Skin ulcer of malleolar area of right ankle ([facility]     Spinal stenosis of lumbar region with radiculopathy 5/4/2015    Dr. Taty Logan     Past Surgical History:   Procedure Laterality Date    CARDIAC CATHETERIZATION  3/8/2017         CARDIAC CATHETERIZATION  3/15/2017         CORONARY STENT SINGLE W/PTCA  3/15/2017         HX ABOVE KNEE AMPUTATION Left 08/18/2017    S/P Left above-the-knee amputation (8/18/2017 - Dr. Pavithra Maravilla)    HX ATHERECTOMY Left 06/15/2017    S/P Atherectomy and balloon angioplasty of the left superficial femoral artery and above-knee popliteal artery (6/15/2017 - Dr. Pavithra Maravilla)    HX ATHERECTOMY Right 09/07/2017    S/P Atherectomy and balloon angioplasty of the below-the-knee popliteal artery, above-the-knee popliteal artery, tibioperoneal trunk artery and posterior tibial artery (9/7/2017 - Dr. Pavithra Maravilla)    HX COLONOSCOPY  07/23/2015    polyps repeat 2020 5 years [address] 89 Right 04/04/2017    S/P Right axillary to bifemoral artery bypass using an 8 mm Propaten graft from the right axilla to the right common femoral artery with a jump femoral-femoral bypass with another 8 mm Propaten external ring bypass; Right common femoral artery, and profunda femoris artery and superficial femoral artery endarterectomy causing an extensive surgery on the right side (4/4/2017 - Dr. Abdifatah Do)    [address]asar 94 Right 04/07/2017    S/P Cutdown of femoral-femoral bypass, more on the left groin side; Right lower extremity angiography with first-order catheterization (4/7/2017 - Dr. Heber Talavera)    HX FEMORAL BYPASS Right 04/10/2017    S/P Right femoral to above-knee popliteal bypass with an 8 mm PTFE graft (4/10/2017 - Dr. Rose Orozco)    HX OTHER SURGICAL Left 07/25/2017    S/P Removal of infected graft; Repair of left superficial femoral artery; Repair of left common femoral artery (7/25/2017 - Dr. Heber Talavera)    HX OTHER SURGICAL Right 06/27/2017    S/P Drainage of right side abscess (6/27/2017 - Dr. Heber Talavera)    HX OTHER SURGICAL Left 07/01/2017    S/P Left groin exploration; Left femoral repair pseudoaneurysm; Left wound washout; Wound VAC placement (7/01/2017 - Dr. Heber Talavera)    HX OTHER SURGICAL Left 07/04/2017    S/P Left common femoral artery ligation; Jump bypass from right to left fem-fem to a more distal superficial femoral artery using 8 mm external ringed Propaten graft; Left groin wound exploration and washout (7/4/2017 - Dr. Heber Talavera)    HX OTHER SURGICAL Right 08/18/2017    S/P Right axillary femoral jump bypass interposition; Removal of infected right axillary femoral bypass;  Wound VAC placement of upper thigh 1.2 cm x 5.2 cm x 1.8 cm and groin 4 cm x 0.5 cm x 0.2 cm (8/18/2017 - Dr. Heber Talavera)     Patient Active Problem List   Diagnosis Code    Benign hypertensive heart disease with systolic congestive heart failure, NYHA class 2 ([facility] I11.0, I50.20    DDD (degenerative disc disease), lumbar M51.36    Erectile dysfunction N52.9    Spinal stenosis of lumbar region with radiculopathy M48.061, M54.16    Hereditary peripheral neuropathy G60.9    Aortoiliac occlusive disease ([address] I74.09    Atherosclerosis of native artery of both lower extremities with intermittent claudication ([facility] I70.213    Peripheral artery disease ([facility] I73.9    Coronary artery disease involving native coronary artery of native heart I25.10    Paroxysmal atrial fibrillation ([facility] I48.0    Acute blood loss as cause of postoperative anemia D62    Impaired mobility and ADLs Z74.09    Ischemic cardiomyopathy I25.5    Chronic systolic heart failure ([facility]) I50.22    Carotid artery disease ([facility]) I73.9    Dyslipidemia E78.5    Euthyroid sick syndrome E07.81    Aftercare following surgery of the circulatory system Z48.812    Status post femoral-popliteal bypass surgery Z95.828    History of cardioversion Z98.890    Critical ischemia of lower extremity I99.8    History of vitamin D deficiency Z86.39    Pseudoaneurysm of femoral artery ([facility]) I72.4    Infection of vascular bypass graft ([facility]) T82. 7XXA    Chronic anemia D64.9    Chronic ulcer of right heel ([facility]) L97.419    Ischemic rest pain of lower extremity ([facility]) I73.9    Prolonged Q-T interval on ECG R94.31    Status post above knee amputation of left lower extremity C68.674    Aftercare for amputation stump Z47.81    Moderate to severe pulmonary hypertension ([facility]) I27.20    Adverse effect of amiodarone T46.2X5A    Skin ulcer of malleolar area of right ankle ([facility]) L97.319     Current Outpatient Medications   Medication Sig Dispense Refill    amoxicillin-clavulanate (AUGMENTIN) 875-125 mg per tablet Take 1 Tab by mouth every twelve (12) hours. 14 Tab 0    furosemide (LASIX) 20 mg tablet take 1 tablet by mouth once daily for PULMONARY EDEMA DUE TO CHRONIC HEART FAILURE 90 Tab 1    clopidogrel (PLAVIX) 75 mg tab take 1 tablet by mouth once daily 30 Tab 6    aspirin 81 mg chewable tablet chew and swallow 1 tablet by mouth once daily WITH BREAKFAST 90 Tab 3    gabapentin (NEURONTIN) 400 mg capsule take 1 capsule by mouth three times a day 270 Cap 0    multivitamin (ONE A DAY) tablet Take 1 Tab by mouth daily.       losartan (COZAAR) 100 mg tablet take 1 tablet by mouth once daily 90 Tab 3    atorvastatin (LIPITOR) 80 mg tablet take 1 tablet by mouth once daily 90 Tab 3    metoprolol tartrate (LOPRESSOR) 25 mg tablet take 1 tablet by mouth every 12 hours 180 Tab 1     Allergies   Allergen Reactions    Morphine Other (comments)     Patient gets confused with morphine. Physical Exam:    Visit Vitals  /88 (BP 1 Location: Left arm, BP Patient Position: Sitting)   Pulse 90   Resp 16   Ht 5' 10\" (1.778 m)   Wt 206 lb (93.4 kg)   BMI 29.56 kg/m²      General: Well-appearing male in no acute distress   HEENT: EOMI, no scleral icterus is noted. No carotid bruit appreciated bilaterally. CV: RRR  Pulmonary: No increased work or breathing is noted. CTA bilaterally. Abdomen: nondistended. Extremities: s/p left AKA. Prosthetic in place. He is ambulating with crutches. No edema RLE. Warm and well perfused on right. No ulcer. Neuro: Cranial nerves II through XII are grossly intact       Impression and Plan:  Tiesha Burt is a 62 y.o. male with complex vascular history as above. His imaging was reviewed with him in the office today. Bypass grafts are all patent with biphasic flow. He continues to do well and has done remarkably well at maintaining his independence and mobility. Discussed that we will continue to follow him every 6 months with repeat studies. He is certainly understanding to call the office sooner as needed. Plan was discussed. Patient expresses understanding and agrees. He will continue on ASA/Plavix/statin. [address] [place] [address] N MEHNAZ      PLEASE NOTE:  This document has been produced using voice recognition software. Unrecognized errors in transcription may be present.

## 2019-11-12 RX ORDER — LOSARTAN POTASSIUM 100 MG/1
TABLET ORAL
Qty: 30 TAB | Refills: 6 | Status: SHIPPED | OUTPATIENT
Start: 2019-11-12 | End: 2020-10-19

## 2019-12-19 DIAGNOSIS — I50.22 CHRONIC SYSTOLIC HEART FAILURE (HCC): Chronic | ICD-10-CM

## 2019-12-19 DIAGNOSIS — I50.20 BENIGN HYPERTENSIVE HEART DISEASE WITH SYSTOLIC CONGESTIVE HEART FAILURE, NYHA CLASS 2 (HCC): Chronic | ICD-10-CM

## 2019-12-19 DIAGNOSIS — I11.0 BENIGN HYPERTENSIVE HEART DISEASE WITH SYSTOLIC CONGESTIVE HEART FAILURE, NYHA CLASS 2 (HCC): Chronic | ICD-10-CM

## 2019-12-19 NOTE — TELEPHONE ENCOUNTER
This patient contacted office for the following prescriptions to be filled:    Medication requested :   Requested Prescriptions     Pending Prescriptions Disp Refills    aspirin 81 mg chewable tablet 90 Tab 3    furosemide (LASIX) 20 mg tablet 90 Tab 1     PCP: [address] or Print: [facility] [address]  Mail order or [facility]     Scheduled appointment if not seen by current providers in office: LOV 6/27/2019 F/U 12/31/2019 NEEDS TO GET IN THE SYSTEM BEFORE HIS APPOINTE MENT BECAUSE IT TAKES 2WKS BEFORE IT DELIVERS TO HOME

## 2019-12-23 RX ORDER — GUAIFENESIN 100 MG/5ML
81 LIQUID (ML) ORAL DAILY
Qty: 90 TAB | Refills: 3 | Status: SHIPPED | OUTPATIENT
Start: 2019-12-23 | End: 2020-04-10 | Stop reason: SDUPTHER

## 2019-12-23 RX ORDER — FUROSEMIDE 20 MG/1
20 TABLET ORAL DAILY
Qty: 90 TAB | Refills: 1 | Status: SHIPPED | OUTPATIENT
Start: 2019-12-23 | End: 2020-06-09

## 2019-12-27 RX ORDER — CLOPIDOGREL BISULFATE 75 MG/1
TABLET ORAL
Qty: 90 TAB | Refills: 3 | Status: SHIPPED | OUTPATIENT
Start: 2019-12-27 | End: 2020-12-06

## 2020-01-28 ENCOUNTER — OFFICE VISIT (OUTPATIENT)
Dept: CARDIOLOGY CLINIC | Age: 60
End: 2020-01-28

## 2020-01-28 VITALS
SYSTOLIC BLOOD PRESSURE: 124 MMHG | BODY MASS INDEX: 28.92 KG/M2 | HEART RATE: 77 BPM | DIASTOLIC BLOOD PRESSURE: 80 MMHG | OXYGEN SATURATION: 99 % | WEIGHT: 202 LBS | HEIGHT: 70 IN

## 2020-01-28 DIAGNOSIS — I70.213 ATHEROSCLEROSIS OF NATIVE ARTERY OF BOTH LOWER EXTREMITIES WITH INTERMITTENT CLAUDICATION (HCC): Primary | ICD-10-CM

## 2020-01-28 DIAGNOSIS — I25.5 ISCHEMIC CARDIOMYOPATHY: ICD-10-CM

## 2020-01-28 DIAGNOSIS — I73.9 PERIPHERAL ARTERY DISEASE (HCC): ICD-10-CM

## 2020-01-28 DIAGNOSIS — E78.5 DYSLIPIDEMIA: ICD-10-CM

## 2020-01-28 DIAGNOSIS — I48.0 PAROXYSMAL ATRIAL FIBRILLATION (HCC): ICD-10-CM

## 2020-01-28 NOTE — PROGRESS NOTES
HPI:  I saw Theo Balderrama in my office today in cardiovascular evaluation regarding his problems with a dilated cardiomyopathy and paroxysmal atrial fibrillation.  Mr. Katherine Balderrama is a pleasant 60-year-old  male with a nonischemic cardiomyopathy.  Historically, he has had multiple issues including dyslipidemia, peripheral vascular disease status post a left AKA amputation, paroxysmal atrial fibrillation for which he has been on Coumadin in the past and underwent a cardioversion back in April 2017, and coronary artery disease with a cardiac catheterization back on March 8, 2017 demonstrating mild disease except for proximal 50% obstruction and healed 90% trifurcation lesion in the mid circumflex as well as a 50% diffuse proximal and 90% mid right coronary artery obstruction with successful stenting of the circumflex and the RCA on March 15, 2017 using Xience drug-eluting stents. He comes in today and relates that he is continued to do quite well. He is remaining quite active and denies any cardiovascular symptomatology at this time. Encounter Diagnoses   Name Primary?  Atherosclerosis of native artery of both lower extremities with intermittent claudication (HCC) Yes    Ischemic cardiomyopathy     Paroxysmal atrial fibrillation (HCC)     Dyslipidemia     Peripheral artery disease ([address]        Discussion: This patient appears to be doing about as well as we could expect and I really have no recommendations for change currently. He is not having any symptoms to suggest the development of rate current symptomatic ischemic heart disease, any overt heart failure, or any history to suggest recurrent paroxysmal atrial fibrillation. He does have significant peripheral vascular disease status post a left AKA amputation significant carotid disease with known totally occluded right internal carotid artery, but he is being followed through Dr. Ti Chang office every 6 months for these problems.     His latest lipid profile which was completed on May 1, 2019 showed total cholesterol of 93 with triglycerides of 90, HDL of 22, LDL of 53, and VLDL of 18 which I think is very good control on Lipitor 80 mg daily. His blood pressure is well controlled today and his EKG remained stable and since he is otherwise doing well I will simply plan to see him again in several months    PCP: Javi Weinstein MD      Past Medical History:   Diagnosis Date    Acute blood loss as cause of postoperative anemia 4/4/2017    Anticoagulated on Coumadin     Aortoiliac occlusive disease ([address] 1/25/2017    Atherosclerosis of native artery of both lower extremities with intermittent claudication ([address] 1/25/2017    Benign hypertensive heart disease with systolic congestive heart failure, NYHA class 2 ([address] 1/26/2015    Carotid artery disease (HCC)     Chronic anemia     Chronic systolic heart failure (HCC)     Chronic ulcer of right foot ([address] 4/4/2017    Chronic ulcer of right heel ([address] 8/8/2017    Coronary artery disease involving native coronary artery of native heart 3/15/2017    Successful stenting of Cx (Xience LESLEY) and RCA (Xience LESLEY) to 0% by Dr. Thor Tabares on 3/15/17.     DDD (degenerative disc disease), lumbar 1/26/2015    Dyslipidemia     Erectile dysfunction 7/5/2016    Euthyroid sick syndrome 4/6/2017    Hereditary peripheral neuropathy 11/15/2016    History of cardioversion 4/18/2017    S/P Synchronized external cardioversion (4/18/2017 - Dr. Nehal De Anda)    History of vitamin D deficiency 4/22/2017    Vitamin D 25-Hydroxy (4/22/2017) = 12.0; Vitamin D 25-Hydroxy (5/26/2017) = 38.7    Infection of vascular bypass graft ([address] 7/24/2017    Left lower extremity    Ischemic cardiomyopathy     Moderate to severe pulmonary hypertension ([address] 7/23/2017    Paroxysmal atrial fibrillation (HCC)     Peripheral artery disease ([address] 1/25/2017    Skin ulcer of malleolar area of right ankle ([address]     Spinal stenosis of lumbar region with radiculopathy 5/4/2015    Dr. Hilda Castillo       Past Surgical History:   Procedure Laterality Date    CARDIAC CATHETERIZATION  3/8/2017         CARDIAC CATHETERIZATION  3/15/2017         CORONARY STENT SINGLE W/PTCA  3/15/2017         HX ABOVE KNEE AMPUTATION Left 08/18/2017    S/P Left above-the-knee amputation (8/18/2017 - Dr. Lee Naqvi)    HX ATHERECTOMY Left 06/15/2017    S/P Atherectomy and balloon angioplasty of the left superficial femoral artery and above-knee popliteal artery (6/15/2017 - Dr. Lee Naqvi)    HX ATHERECTOMY Right 09/07/2017    S/P Atherectomy and balloon angioplasty of the below-the-knee popliteal artery, above-the-knee popliteal artery, tibioperoneal trunk artery and posterior tibial artery (9/7/2017 - Dr. Lee Naqvi)    HX COLONOSCOPY  07/23/2015    polyps repeat 2020 5 years Pasquale [address] 94 Right 04/04/2017    S/P Right axillary to bifemoral artery bypass using an 8 mm Propaten graft from the right axilla to the right common femoral artery with a jump femoral-femoral bypass with another 8 mm Propaten external ring bypass; Right common femoral artery, and profunda femoris artery and superficial femoral artery endarterectomy causing an extensive surgery on the right side (4/4/2017 - Dr. Marsh Severs)    [address] Right 04/07/2017    S/P Cutdown of femoral-femoral bypass, more on the left groin side; Right lower extremity angiography with first-order catheterization (4/7/2017 - Dr. Lee Naqvi)    HX FEMORAL BYPASS Right 04/10/2017    S/P Right femoral to above-knee popliteal bypass with an 8 mm PTFE graft (4/10/2017 - Dr. Pedro Whitmore)    HX OTHER SURGICAL Left 07/25/2017    S/P Removal of infected graft; Repair of left superficial femoral artery; Repair of left common femoral artery (7/25/2017 - Dr. Lee Naqvi)    HX OTHER SURGICAL Right 06/27/2017    S/P Drainage of right side abscess (6/27/2017 - Dr. eGna Cabrera)    HX OTHER SURGICAL Left 07/01/2017    S/P Left groin exploration; Left femoral repair pseudoaneurysm; Left wound washout; Wound VAC placement (7/01/2017 - Dr. Gena Cabrera)    HX OTHER SURGICAL Left 07/04/2017    S/P Left common femoral artery ligation; Jump bypass from right to left fem-fem to a more distal superficial femoral artery using 8 mm external ringed Propaten graft; Left groin wound exploration and washout (7/4/2017 - Dr. Gena Cabrera)    HX OTHER SURGICAL Right 08/18/2017    S/P Right axillary femoral jump bypass interposition; Removal of infected right axillary femoral bypass; Wound VAC placement of upper thigh 1.2 cm x 5.2 cm x 1.8 cm and groin 4 cm x 0.5 cm x 0.2 cm (8/18/2017 - Dr. Gena Cabrera)       Current Outpatient Medications   Medication Sig    clopidogrel (PLAVIX) 75 mg tab take 1 tablet by mouth once daily    aspirin 81 mg chewable tablet Take 1 Tab by mouth daily.  furosemide (LASIX) 20 mg tablet Take 1 Tab by mouth daily.  losartan (COZAAR) 100 mg tablet take 1 tablet by mouth once daily    atorvastatin (LIPITOR) 80 mg tablet take 1 tablet by mouth once daily    metoprolol tartrate (LOPRESSOR) 25 mg tablet take 1 tablet by mouth every 12 hours    amoxicillin-clavulanate (AUGMENTIN) 875-125 mg per tablet Take 1 Tab by mouth every twelve (12) hours.  gabapentin (NEURONTIN) 400 mg capsule take 1 capsule by mouth three times a day    multivitamin (ONE A DAY) tablet Take 1 Tab by mouth daily. No current facility-administered medications for this visit. Allergies   Allergen Reactions    Morphine Other (comments)     Patient gets confused with morphine. Social History :  Social History     Tobacco Use    Smoking status: Former Smoker    Smokeless tobacco: Never Used    Tobacco comment: quit in 2011   Substance Use Topics    Alcohol use:  Yes     Alcohol/week: 2.0 standard drinks Types: 2 Cans of beer per week     Comment: 10 cans of beer a month        Family History: family history includes Heart Disease in his father. Review of Systems:   Constitutional: Negative. Respiratory: Negative. Cardiovascular: Negative. Gastrointestinal: Negative. Musculoskeletal: Negative. Neurological: Negative. Physical Exam:    The patient is a cooperative, alert, well developed, well nourished 62 y.o.  male who is in no acute distress at the time of the examination. Visit Vitals  /80 (BP 1 Location: Right arm, BP Patient Position: Sitting)   Pulse 77   Ht 5' 10\" (1.778 m)   Wt 91.6 kg (202 lb)   SpO2 99%   BMI 28.98 kg/m²       HEENT: Conjuctiva white, mucosa moist, no pallor or cyanosis. NECK: Supple without masses, tenderness or thyromegaly. There was no jugular venous distention. Carotid are full bilaterally without bruits. CHEST: Symmetrical with good excursion. LUNGS: Clear to auscultation in all fields. HEART: The apex is not displaced. There were no lifts, thrills or heaves. There is a normal S1 and S2 without appreciable murmurs, rubs, clicks, or gallops auscultated. ABDOMEN: Soft without masses, tenderness or organomegaly. EXTREMITIES: 1 + peripheral pulses on the right leg without edema and left AKA. Review of Data: See PMH and Cardiology and Imaging sections for cardiac testing  Lab Results   Component Value Date/Time    Cholesterol, total 93 05/01/2019 06:48 AM    HDL Cholesterol 22 (L) 05/01/2019 06:48 AM    LDL, calculated 53 05/01/2019 06:48 AM    Triglyceride 90 05/01/2019 06:48 AM    CHOL/HDL Ratio 4.2 05/01/2019 06:48 AM       Results for orders placed or performed in visit on 01/28/20   AMB POC EKG ROUTINE W/ 12 LEADS, INTER & REP     Status: None    Narrative    Normal sinus rhythm rate 77. Mild diffuse nonspecific ST change. Compared to the EKG of July 23, 2019 there was little interval change.          Ofelia Meadows D.O., GEOVANNY BELTRÁN. [facility]  [address] [facility] [place]  [address] [facility]. [address]      PLEASE NOTE:  This document has been produced using voice recognition software. Unrecognized errors in transcription may be present.

## 2020-02-11 ENCOUNTER — OFFICE VISIT (OUTPATIENT)
Dept: FAMILY MEDICINE CLINIC | Age: 60
End: 2020-02-11

## 2020-02-11 VITALS
HEART RATE: 78 BPM | HEIGHT: 70 IN | OXYGEN SATURATION: 98 % | BODY MASS INDEX: 29.78 KG/M2 | WEIGHT: 208 LBS | TEMPERATURE: 97.9 F | RESPIRATION RATE: 16 BRPM | SYSTOLIC BLOOD PRESSURE: 124 MMHG | DIASTOLIC BLOOD PRESSURE: 78 MMHG

## 2020-02-11 DIAGNOSIS — I11.0 BENIGN HYPERTENSIVE HEART DISEASE WITH SYSTOLIC CONGESTIVE HEART FAILURE, NYHA CLASS 2 (HCC): ICD-10-CM

## 2020-02-11 DIAGNOSIS — I73.9 PERIPHERAL ARTERY DISEASE (HCC): Primary | ICD-10-CM

## 2020-02-11 DIAGNOSIS — I50.20 BENIGN HYPERTENSIVE HEART DISEASE WITH SYSTOLIC CONGESTIVE HEART FAILURE, NYHA CLASS 2 (HCC): ICD-10-CM

## 2020-02-11 DIAGNOSIS — I25.10 CORONARY ARTERY DISEASE INVOLVING NATIVE CORONARY ARTERY OF NATIVE HEART WITHOUT ANGINA PECTORIS: ICD-10-CM

## 2020-02-11 DIAGNOSIS — E78.5 HYPERLIPIDEMIA LDL GOAL <70: ICD-10-CM

## 2020-02-11 DIAGNOSIS — Z89.612 STATUS POST ABOVE-KNEE AMPUTATION OF LEFT LOWER EXTREMITY (HCC): ICD-10-CM

## 2020-02-11 DIAGNOSIS — E78.5 DYSLIPIDEMIA: ICD-10-CM

## 2020-02-11 NOTE — PATIENT INSTRUCTIONS
DASH Diet: Care Instructions  Your Care Instructions    The DASH diet is an eating plan that can help lower your blood pressure. DASH stands for Dietary Approaches to Stop Hypertension. Hypertension is high blood pressure. The DASH diet focuses on eating foods that are high in calcium, potassium, and magnesium. These nutrients can lower blood pressure. The foods that are highest in these nutrients are fruits, vegetables, low-fat dairy products, nuts, seeds, and legumes. But taking calcium, potassium, and magnesium supplements instead of eating foods that are high in those nutrients does not have the same effect. The DASH diet also includes whole grains, fish, and poultry. The DASH diet is one of several lifestyle changes your doctor may recommend to lower your high blood pressure. Your doctor may also want you to decrease the amount of sodium in your diet. Lowering sodium while following the DASH diet can lower blood pressure even further than just the DASH diet alone. Follow-up care is a key part of your treatment and safety. Be sure to make and go to all appointments, and call your doctor if you are having problems. It's also a good idea to know your test results and keep a list of the medicines you take. How can you care for yourself at home? Following the DASH diet  · Eat 4 to 5 servings of fruit each day. A serving is 1 medium-sized piece of fruit, ½ cup chopped or canned fruit, 1/4 cup dried fruit, or 4 ounces (½ cup) of fruit juice. Choose fruit more often than fruit juice. · Eat 4 to 5 servings of vegetables each day. A serving is 1 cup of lettuce or raw leafy vegetables, ½ cup of chopped or cooked vegetables, or 4 ounces (½ cup) of vegetable juice. Choose vegetables more often than vegetable juice. · Get 2 to 3 servings of low-fat and fat-free dairy each day. A serving is 8 ounces of milk, 1 cup of yogurt, or 1 ½ ounces of cheese. · Eat 6 to 8 servings of grains each day.  A serving is 1 slice of bread, 1 ounce of dry cereal, or ½ cup of cooked rice, pasta, or cooked cereal. Try to choose whole-grain products as much as possible. · Limit lean meat, poultry, and fish to 2 servings each day. A serving is 3 ounces, about the size of a deck of cards. · Eat 4 to 5 servings of nuts, seeds, and legumes (cooked dried beans, lentils, and split peas) each week. A serving is 1/3 cup of nuts, 2 tablespoons of seeds, or ½ cup of cooked beans or peas. · Limit fats and oils to 2 to 3 servings each day. A serving is 1 teaspoon of vegetable oil or 2 tablespoons of salad dressing. · Limit sweets and added sugars to 5 servings or less a week. A serving is 1 tablespoon jelly or jam, ½ cup sorbet, or 1 cup of lemonade. · Eat less than 2,300 milligrams (mg) of sodium a day. If you limit your sodium to 1,500 mg a day, you can lower your blood pressure even more. Tips for success  · Start small. Do not try to make dramatic changes to your diet all at once. You might feel that you are missing out on your favorite foods and then be more likely to not follow the plan. Make small changes, and stick with them. Once those changes become habit, add a few more changes. · Try some of the following:  ? Make it a goal to eat a fruit or vegetable at every meal and at snacks. This will make it easy to get the recommended amount of fruits and vegetables each day. ? Try yogurt topped with fruit and nuts for a snack or healthy dessert. ? Add lettuce, tomato, cucumber, and onion to sandwiches. ? Combine a ready-made pizza crust with low-fat mozzarella cheese and lots of vegetable toppings. Try using tomatoes, squash, spinach, broccoli, carrots, cauliflower, and onions. ? Have a variety of cut-up vegetables with a low-fat dip as an appetizer instead of chips and dip. ? Sprinkle sunflower seeds or chopped almonds over salads. Or try adding chopped walnuts or almonds to cooked vegetables.   ? Try some vegetarian meals using beans and peas. Add garbanzo or kidney beans to salads. Make burritos and tacos with mashed cooney beans or black beans. Where can you learn more? Go to http://yuniel-lyndsay.info/. Enter A896 in the search box to learn more about \"DASH Diet: Care Instructions. \"  Current as of: April 9, 2019  Content Version: 12.2  © 9792-3331 [employer], [employer]. Care instructions adapted under license by [employer] (which disclaims liability or warranty for this information). If you have questions about a medical condition or this instruction, always ask your healthcare professional. [address] any warranty or liability for your use of this information.

## 2020-02-11 NOTE — PROGRESS NOTES
Nicolas Bernal, 62 y.o.,  male    SUBJECTIVE  Ff-up    No new concerns     PAD bilateral- s/p AKA Left leg and complicated revasculariation (2017). Doing well. Continues to see vascular surgery q6m and patent flow on recent visit. Continues to take gabapentin dose leg pain. He keeps active, working, driving. H/o CAD s/p angioplasty and Afib.h/o of GI bleeding on coumadin  Denies cp, syncope, palpitations, on amiodarone asa/plavix. he is following dr. Sugey Gomez. Reviewed note no changes    HTN- on BB, arb,  Lasix. ROS:  See HPI, all others negative        Patient Active Problem List   Diagnosis Code    Benign hypertensive heart disease with systolic congestive heart failure, NYHA class 2 ([facility]) I11.0, I50.20    DDD (degenerative disc disease), lumbar M51.36    Erectile dysfunction N52.9    Spinal stenosis of lumbar region with radiculopathy M48.061, M54.16    Hereditary peripheral neuropathy G60.9    Aortoiliac occlusive disease ([address] I74.09    Atherosclerosis of native artery of both lower extremities with intermittent claudication ([address] I70.213    Peripheral artery disease ([address] I73.9    Coronary artery disease involving native coronary artery of native heart I25.10    Paroxysmal atrial fibrillation ([facility]) I48.0    Acute blood loss as cause of postoperative anemia D62    Impaired mobility and ADLs Z74.09    Ischemic cardiomyopathy I25.5    Chronic systolic heart failure (HCC) I50.22    Carotid artery disease ([facility]) I73.9    Dyslipidemia E78.5    Euthyroid sick syndrome E07.81    Aftercare following surgery of the circulatory system Z48.812    Status post femoral-popliteal bypass surgery Z95.828    History of cardioversion Z98.890    Critical ischemia of lower extremity I99.8    History of vitamin D deficiency Z86.39    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 7XXA    Chronic anemia D64.9    Chronic ulcer of right heel ([address] L97.419    Ischemic rest pain of lower extremity ([facility]) I73.9    Prolonged Q-T interval on ECG R94.31    Status post above knee amputation of left lower extremity W33.644    Aftercare for amputation stump Z47.81    Moderate to severe pulmonary hypertension ([facility]) I27.20    Adverse effect of amiodarone T46.2X5A    Skin ulcer of malleolar area of right ankle ([facility]) L97.319       Current Outpatient Medications   Medication Sig Dispense Refill    clopidogrel (PLAVIX) 75 mg tab take 1 tablet by mouth once daily 90 Tab 3    aspirin 81 mg chewable tablet Take 1 Tab by mouth daily. 90 Tab 3    furosemide (LASIX) 20 mg tablet Take 1 Tab by mouth daily. 90 Tab 1    losartan (COZAAR) 100 mg tablet take 1 tablet by mouth once daily 30 Tab 6    metoprolol tartrate (LOPRESSOR) 25 mg tablet take 1 tablet by mouth every 12 hours 180 Tab 1    gabapentin (NEURONTIN) 400 mg capsule take 1 capsule by mouth three times a day 270 Cap 0    multivitamin (ONE A DAY) tablet Take 1 Tab by mouth daily.  atorvastatin (LIPITOR) 80 mg tablet take 1 tablet by mouth once daily 90 Tab 3    amoxicillin-clavulanate (AUGMENTIN) 875-125 mg per tablet Take 1 Tab by mouth every twelve (12) hours. 14 Tab 0       Allergies   Allergen Reactions    Morphine Other (comments)     Patient gets confused with morphine.        Past Medical History:   Diagnosis Date    Acute blood loss as cause of postoperative anemia 4/4/2017    Anticoagulated on Coumadin     Aortoiliac occlusive disease (HCC) 1/25/2017    Atherosclerosis of native artery of both lower extremities with intermittent claudication ([address] 1/25/2017    Benign hypertensive heart disease with systolic congestive heart failure, NYHA class 2 ([address] 1/26/2015    Carotid artery disease (HCC)     Chronic anemia     Chronic systolic heart failure (HCC)     Chronic ulcer of right foot ([address] 4/4/2017    Chronic ulcer of right heel ([address] 8/8/2017    Coronary artery disease involving native coronary artery of native heart 3/15/2017    Successful stenting of Cx (Xience LESLEY) and RCA (Xience LESLEY) to 0% by Dr. Shaneka Caruso on 3/15/17.  DDD (degenerative disc disease), lumbar 1/26/2015    Dyslipidemia     Erectile dysfunction 7/5/2016    Euthyroid sick syndrome 4/6/2017    Hereditary peripheral neuropathy 11/15/2016    History of cardioversion 4/18/2017    S/P Synchronized external cardioversion (4/18/2017 - Dr. Emanuel Hare)    History of vitamin D deficiency 4/22/2017    Vitamin D 25-Hydroxy (4/22/2017) = 12.0; Vitamin D 25-Hydroxy (5/26/2017) = 38.7    Infection of vascular bypass graft [facility] 7/24/2017    Left lower extremity    Ischemic cardiomyopathy     Moderate to severe pulmonary hypertension ([facility] 7/23/2017    Paroxysmal atrial fibrillation (HCC)     Peripheral artery disease ([facility] 1/25/2017    Skin ulcer of malleolar area of right ankle ([facility]     Spinal stenosis of lumbar region with radiculopathy 5/4/2015    Dr. Shaunna Steinberg       Social History     Socioeconomic History    Marital status: LEGALLY      Spouse name: Not on file    Number of children: Not on file    Years of education: Not on file    Highest education level: Not on file   Occupational History    Not on file   Social Needs    Financial resource strain: Not on file    Food insecurity:     Worry: Not on file     Inability: Not on file    Transportation needs:     Medical: Not on file     Non-medical: Not on file   Tobacco Use    Smoking status: Former Smoker    Smokeless tobacco: Never Used    Tobacco comment: quit in 2011   Substance and Sexual Activity    Alcohol use:  Yes     Alcohol/week: 2.0 standard drinks     Types: 2 Cans of beer per week     Comment: 10 cans of beer a month    Drug use: Yes     Types: Marijuana     Comment: occasionally-last used 4/17    Sexual activity: Yes     Partners: Female   Lifestyle    Physical activity:     Days per week: Not on file     Minutes per session: Not on file    Stress: Not on file   Relationships    Social connections:     Talks on phone: Not on file     Gets together: Not on file     Attends Bahai service: Not on file     Active member of club or organization: Not on file     Attends meetings of clubs or organizations: Not on file     Relationship status: Not on file    Intimate partner violence:     Fear of current or ex partner: Not on file     Emotionally abused: Not on file     Physically abused: Not on file     Forced sexual activity: Not on file   Other Topics Concern    Not on file   Social History Narrative    Not on file       Family History   Problem Relation Age of Onset    Heart Disease Father          OBJECTIVE    Physical Exam:     Visit Vitals  /78 (BP 1 Location: Left arm, BP Patient Position: Sitting)   Pulse 78   Temp 97.9 °F (36.6 °C) (Oral)   Resp 16   Ht 5' 10\" (1.778 m)   Wt 208 lb (94.3 kg)   SpO2 98%   BMI 29.84 kg/m²       General: alert, in no apparent distress or pain  Neck: supple, no adenopathy palpated  CVS: normal rate, regular rhythm, distinct S1 and S2  Lungs:clear to ausculation bilaterally, no crackles, wheezing or rhonchi noted  Extremities: L AKA  W/ prosthesis  Psych:  mood and affect normal    Results for orders placed or performed in visit on 11/07/19   DUPLEX AORTA/IVC/ILIAC/GRAFTS LTD RT   Result Value Ref Range    Right Inflow Artery PSV 98.4 cm/s    Right Dist Anastomosis PSV 42.7 cm/s    Right Prox Anastomosis .9 cm/s    Right Dist Outflow PSV 79.0 cm/s    Right Mid Outflow PSV 86.6 cm/s    Right Prox Outflow PSV 87.9 cm/s    Right Outflow Vessel PSV 81.2 cm/s    Right Graft 1 RT SCA/AXA to  RT CFA BPG    '    ASSESSMENT/PLAN  Diagnoses and all orders for this visit:    PAD (peripheral artery disease) ([facility]  Doing well  S/p L AKA (3665), and complicated revascularization  plavix, asa, statin, gabapentin for pain.    Following Vasc surgery and podiatry    Benign hypertensive heart disease with systolic congestive heart failure, NYHA class 2 (HCC)  Appears compensated  Cont lasix, on BB and ARB  Cardiology dr. Janiya Starkey following     Coronary artery disease involving native coronary artery of native heart without angina pectoris  S/p stent  On ASA, plavix, BB, ARB, statin  following     Dyslipidemia  LDL goal is <70,   On lipitor 80 mg  Check cmp, lipid panel prior to next visit     Status post above knee amputation of left lower extremity ([facility]        Follow-up and Dispositions    · Return in about 6 months (around 8/11/2020), or if symptoms worsen or fail to improve, for routine chronic illness care, fasting labs a week prior to your next visit. Patient understands plan of care. Patient has provided input and agrees with goals.

## 2020-02-11 NOTE — PROGRESS NOTES
1. Have you been to the ER, urgent care clinic since your last visit? Hospitalized since your last visit? No    2. Have you seen or consulted any other health care providers outside of the [address] [place] [address] since your last visit? Include any pap smears or colon screening.  No

## 2020-04-01 ENCOUNTER — TELEPHONE (OUTPATIENT)
Dept: VASCULAR SURGERY | Age: 60
End: 2020-04-01

## 2020-04-01 NOTE — TELEPHONE ENCOUNTER
Contacted patient in regards to upcoming appointments. Due to COVID 19, we will be rescheduling patient's appointments out to a later date. Patient is to call with any new symptoms or concerns. Patient is in agreement with plan. Patient states that he has a slight tingle in his neck but he thinks it may be a pulled muscle, explained to him if it doesn't get any better or get worse to call our office immediately. Patient agreed.

## 2020-04-10 RX ORDER — GUAIFENESIN 100 MG/5ML
81 LIQUID (ML) ORAL DAILY
Qty: 90 TAB | Refills: 3 | Status: SHIPPED | OUTPATIENT
Start: 2020-04-10

## 2020-06-09 DIAGNOSIS — I50.22 CHRONIC SYSTOLIC HEART FAILURE (HCC): Chronic | ICD-10-CM

## 2020-06-09 DIAGNOSIS — I11.0 BENIGN HYPERTENSIVE HEART DISEASE WITH SYSTOLIC CONGESTIVE HEART FAILURE, NYHA CLASS 2 (HCC): Chronic | ICD-10-CM

## 2020-06-09 DIAGNOSIS — I50.20 BENIGN HYPERTENSIVE HEART DISEASE WITH SYSTOLIC CONGESTIVE HEART FAILURE, NYHA CLASS 2 (HCC): Chronic | ICD-10-CM

## 2020-06-09 RX ORDER — FUROSEMIDE 20 MG/1
TABLET ORAL
Qty: 90 TAB | Refills: 1 | Status: SHIPPED | OUTPATIENT
Start: 2020-06-09 | End: 2020-12-07

## 2020-08-31 NOTE — CDMP QUERY
Please clarify if this patient is being treated/managed for:    =>Acute blood loss anemia  =>Other Explanation of clinical findings  =>Unable to Determine (no explanation of clinical findings)    The medical record reflects the following:  Risk:  --had surgery on 6/27 with evacuation of hematoma and repair pseudoaneurysm    Clinical Indicators:  --6/27/2017 17:12: HGB: 6.6 (L)  --6/28/2017 05:55: HGB: 7.0 (L)    Treatment:   --receiving serial CBC  --\"Transfuse prbcs\" on 6/28 pn    Please clarify and document your clinical opinion in the progress notes and discharge summary including the definitive and/or presumptive diagnosis, (suspected or probable), related to the above clinical findings. Please include clinical findings supporting your diagnosis. If you DECLINE this query or would like to communicate with [facility], please utilize the \"[employer]aw[employer] message box\" at the TOP of the Progress Note on the right.       Thank you,  [facility] [address] See portal message, please advise.

## 2020-09-03 ENCOUNTER — OFFICE VISIT (OUTPATIENT)
Dept: VASCULAR SURGERY | Age: 60
End: 2020-09-03

## 2020-09-03 VITALS
HEIGHT: 70 IN | RESPIRATION RATE: 20 BRPM | SYSTOLIC BLOOD PRESSURE: 144 MMHG | BODY MASS INDEX: 29.78 KG/M2 | HEART RATE: 87 BPM | OXYGEN SATURATION: 98 % | WEIGHT: 208 LBS | DIASTOLIC BLOOD PRESSURE: 80 MMHG

## 2020-09-03 DIAGNOSIS — Z89.612 S/P AKA (ABOVE KNEE AMPUTATION), LEFT (HCC): ICD-10-CM

## 2020-09-03 DIAGNOSIS — I74.09 AORTOILIAC OCCLUSIVE DISEASE (HCC): Primary | ICD-10-CM

## 2020-09-03 DIAGNOSIS — I73.9 PAD (PERIPHERAL ARTERY DISEASE) (HCC): ICD-10-CM

## 2020-09-03 DIAGNOSIS — I65.21 CAROTID OCCLUSION, RIGHT: ICD-10-CM

## 2020-09-03 DIAGNOSIS — I65.22 STENOSIS OF LEFT CAROTID ARTERY: ICD-10-CM

## 2020-09-03 NOTE — PROGRESS NOTES
Brain Brown    Chief Complaint   Patient presents with    Leg Pain       History and Physical    Brain Brown is a 56 y.o. male with complicated vascular history and multiple vascular surgeries. He has history of ax-fem bypass with jump fem-fem bypass and right fem-pop bypass in April of 2017. He developed an abscess over his ax-fem bypass requiring I&D and repair of left CFA pseudoaneurysm in June 2017. He unfortunately had blow out of his left femoral anastomosis and had left CFA ligation with jump bypass from right to left fem-fem bypass in July 2017. Shortly after he later developed infection of left groin with exposed graft and underwent removal of infected graft, Repair of superficial femoral artery, left side; Repair of common femoral artery in July 2017. While at rehab, he developed acute LLE ischemia, infection of right axillofemoral bypass graft and was brought back into the hospital in August 2017 and ultimately requiring left AKA and removal of infected right axillary femoral graft with placement of right axillary to femoral jump bypass. He had gangrenous changes to his right foot and underwent right leg angio in September 2017. He then suffered a fall onto his left stump requiring washout of hematoma and revision of stump later that month. He presents to the office today for his 6 month follow up appointment with studies. He is doing quite well at this point. 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 He does ambulate with a prosthetic. He states that he will use crutches to ambulate if he has to go a long distance but otherwise he ambulates with a cane. He is not complaining of any pain in the office today. He does not complaining of any claudication or rest pain of the right leg today. He has no skin changes to the right lower extremity or ulcers. No fever/chills. He also has known right ICA occlusion and mild left ICA stenosis.  He denies any vision changes or stroke like symptoms. His follow up studies show \"There is a known occlusion of the right internal carotid artery. The left internal carotid artery is patent without hemodynamically significant stenosis. The right vertebral artery is antegrade. The left vertebral artery is not visualized\". \"The right subclavian artery to right common femoral artery bypass is patent without evidence of significant stenosis. There is a perigraft fluid at the hip area measuring 2.2 x 3.1 cm. There is no evidence of any air in this collection. This collection was evident on a previous study\". \"The right femoral to popliteal artery bypass graft is patent without obvious stenosis. There are poorly biphasic signals throughout which may signify a more proximal arterial stenosis. There is moderate peripheral arterial disease at rest in the right lower extremity\". Past Medical History:   Diagnosis Date    Acute blood loss as cause of postoperative anemia 4/4/2017    Anticoagulated on Coumadin     Aortoiliac occlusive disease (HCC) 1/25/2017    Atherosclerosis of native artery of both lower extremities with intermittent claudication ([address] 1/25/2017    Benign hypertensive heart disease with systolic congestive heart failure, NYHA class 2 ([address] 1/26/2015    Carotid artery disease (HCC)     Chronic anemia     Chronic systolic heart failure (HCC)     Chronic ulcer of right foot ([address] 4/4/2017    Chronic ulcer of right heel ([address] 8/8/2017    Coronary artery disease involving native coronary artery of native heart 3/15/2017    Successful stenting of Cx (Xience LESLEY) and RCA (Xience LESLEY) to 0% by Dr. Alyson Garcia on 3/15/17.     DDD (degenerative disc disease), lumbar 1/26/2015    Dyslipidemia     Erectile dysfunction 7/5/2016    Euthyroid sick syndrome 4/6/2017    Hereditary peripheral neuropathy 11/15/2016    History of cardioversion 4/18/2017    S/P Synchronized external cardioversion (4/18/2017 - Dr. Bakari Enamorado)   [address] [place] [address] History of vitamin D deficiency 4/22/2017    Vitamin D 25-Hydroxy (4/22/2017) = 12.0; Vitamin D 25-Hydroxy (5/26/2017) = 38.7    Infection of vascular bypass graft ([facility] 7/24/2017    Left lower extremity    Ischemic cardiomyopathy     Moderate to severe pulmonary hypertension ([facility] 7/23/2017    Paroxysmal atrial fibrillation (HCC)     Peripheral artery disease ([facility] 1/25/2017    Skin ulcer of malleolar area of right ankle ([facility]     Spinal stenosis of lumbar region with radiculopathy 5/4/2015    Dr. Rashmi Lau     Past Surgical History:   Procedure Laterality Date    CARDIAC CATHETERIZATION  3/8/2017         CARDIAC CATHETERIZATION  3/15/2017         CORONARY STENT SINGLE W/PTCA  3/15/2017         HX ABOVE KNEE AMPUTATION Left 08/18/2017    S/P Left above-the-knee amputation (8/18/2017 - Dr. Jf Rivera)    HX ATHERECTOMY Left 06/15/2017    S/P Atherectomy and balloon angioplasty of the left superficial femoral artery and above-knee popliteal artery (6/15/2017 - Dr. Jf Rivera)    HX ATHERECTOMY Right 09/07/2017    S/P Atherectomy and balloon angioplasty of the below-the-knee popliteal artery, above-the-knee popliteal artery, tibioperoneal trunk artery and posterior tibial artery (9/7/2017 - Dr. Jf Rivera)    HX COLONOSCOPY  07/23/2015    polyps repeat 2020 5 years [address] Right 04/04/2017    S/P Right axillary to bifemoral artery bypass using an 8 mm Propaten graft from the right axilla to the right common femoral artery with a jump femoral-femoral bypass with another 8 mm Propaten external ring bypass; Right common femoral artery, and profunda femoris artery and superficial femoral artery endarterectomy causing an extensive surgery on the right side (4/4/2017 - Dr. Cruz Castle)    [address] Right 04/07/2017    S/P Cutdown of femoral-femoral bypass, more on the left groin side; Right lower extremity angiography with first-order catheterization (4/7/2017 - Dr. Gisela Bryant)    HX FEMORAL BYPASS Right 04/10/2017    S/P Right femoral to above-knee popliteal bypass with an 8 mm PTFE graft (4/10/2017 - Dr. Mark Henry)    HX OTHER SURGICAL Left 07/25/2017    S/P Removal of infected graft; Repair of left superficial femoral artery; Repair of left common femoral artery (7/25/2017 - Dr. Gisela Bryant)    HX OTHER SURGICAL Right 06/27/2017    S/P Drainage of right side abscess (6/27/2017 - Dr. Gisela Bryant)    HX OTHER SURGICAL Left 07/01/2017    S/P Left groin exploration; Left femoral repair pseudoaneurysm; Left wound washout; Wound VAC placement (7/01/2017 - Dr. Gisela Bryant)    HX OTHER SURGICAL Left 07/04/2017    S/P Left common femoral artery ligation; Jump bypass from right to left fem-fem to a more distal superficial femoral artery using 8 mm external ringed Propaten graft; Left groin wound exploration and washout (7/4/2017 - Dr. Gisela Bryant)    HX OTHER SURGICAL Right 08/18/2017    S/P Right axillary femoral jump bypass interposition; Removal of infected right axillary femoral bypass;  Wound VAC placement of upper thigh 1.2 cm x 5.2 cm x 1.8 cm and groin 4 cm x 0.5 cm x 0.2 cm (8/18/2017 - Dr. Gisela Bryant)     Patient Active Problem List   Diagnosis Code    Benign hypertensive heart disease with systolic congestive heart failure, NYHA class 2 ([facility]) I11.0, I50.20    DDD (degenerative disc disease), lumbar M51.36    Erectile dysfunction N52.9    Spinal stenosis of lumbar region with radiculopathy M48.061, M54.16    Hereditary peripheral neuropathy G60.9    Aortoiliac occlusive disease ([address] I74.09    Atherosclerosis of native artery of both lower extremities with intermittent claudication ([address] I70.213    Peripheral artery disease ([address] I73.9    Coronary artery disease involving native coronary artery of native heart I25.10    Paroxysmal atrial fibrillation ([address] I48.0    Acute blood loss as cause of postoperative anemia D62    Impaired mobility and ADLs Z74.09, Z78.9    Ischemic cardiomyopathy I25.5    Chronic systolic heart failure ([facility]) I50.22    Carotid artery disease ([facility]) I77.9    Dyslipidemia E78.5    Euthyroid sick syndrome E07.81    Aftercare following surgery of the circulatory system Z48.812    Status post femoral-popliteal bypass surgery Z95.828    History of cardioversion Z98.890    Critical ischemia of lower extremity I99.8    History of vitamin D deficiency Z86.39    Pseudoaneurysm of femoral artery ([facility]) I72.4    Infection of vascular bypass graft ([facility]) T82. 7XXA    Chronic anemia D64.9    Chronic ulcer of right heel ([facility]) L97.419    Ischemic rest pain of lower extremity ([facility]) I73.9    Prolonged Q-T interval on ECG R94.31    Status post above-knee amputation of left lower extremity ([facility] G44.487    Aftercare for amputation stump Z47.81    Moderate to severe pulmonary hypertension ([facility]) I27.20    Adverse effect of amiodarone T46.2X5A    Skin ulcer of malleolar area of right ankle ([facility]) L97.319     Current Outpatient Medications   Medication Sig Dispense Refill    furosemide (LASIX) 20 mg tablet TAKE 1 TABLET DAILY 90 Tab 1    metoprolol tartrate (LOPRESSOR) 25 mg tablet TAKE 1 TABLET EVERY 12     HOURS 180 Tab 1    aspirin 81 mg chewable tablet Take 1 Tab by mouth daily. 90 Tab 3    clopidogrel (PLAVIX) 75 mg tab take 1 tablet by mouth once daily 90 Tab 3    losartan (COZAAR) 100 mg tablet take 1 tablet by mouth once daily 30 Tab 6    atorvastatin (LIPITOR) 80 mg tablet take 1 tablet by mouth once daily 90 Tab 3    gabapentin (NEURONTIN) 400 mg capsule take 1 capsule by mouth three times a day 270 Cap 0    multivitamin (ONE A DAY) tablet Take 1 Tab by mouth daily. Allergies   Allergen Reactions    Morphine Other (comments)     Patient gets confused with morphine.        Physical Exam:    Visit Vitals  /80 (BP 1 Location: Left arm, BP Patient Position: Sitting)   Pulse 87   Resp 20   Ht 5' 10\" (1.778 m)   Wt 208 lb (94.3 kg)   SpO2 98%   BMI 29.84 kg/m²      General: Well-appearing male in no acute distress . Pt wearing a facemask  HEENT: EOMI, no scleral icterus is noted. No carotid bruit appreciated bilaterally. CV: RRR  Pulmonary: No increased work or breathing is noted. CTA bilaterally. Abdomen: nondistended. Extremities: s/p left AKA. Prosthetic in place. He is ambulating with crutches. No edema RLE. Warm and well perfused on right. No ulcer. Neuro: Cranial nerves II through XII are grossly intact       Impression and Plan:  Mateusz Way is a 56 y.o. male with complex vascular history as above. His imaging was reviewed with him in the office today. Bypass grafts are all patent with biphasic flow. He continues to do well and has done remarkably well at maintaining his independence and mobility. Discussed that we will continue to follow him every 6 months with repeat studies. He is certainly understanding to call the office sooner as needed. Plan was discussed. Patient expresses understanding and agrees. He will continue on ASA/Plavix/statin. [address] [facility] [address] [facility]      PLEASE NOTE:  This document has been produced using voice recognition software. Unrecognized errors in transcription may be present.

## 2020-09-08 ENCOUNTER — HOSPITAL ENCOUNTER (OUTPATIENT)
Dept: LAB | Age: 60
Discharge: HOME | End: 2020-09-08
Payer: COMMERCIAL

## 2020-09-08 DIAGNOSIS — E78.5 HYPERLIPIDEMIA LDL GOAL <70: ICD-10-CM

## 2020-09-08 LAB
ALBUMIN SERPL-MCNC: 3.7 G/DL (ref 3.4–5)
ALBUMIN/GLOB SERPL: 1 {RATIO} (ref 0.8–1.7)
ALP SERPL-CCNC: 133 U/L (ref 45–117)
ALT SERPL-CCNC: 25 U/L (ref 16–61)
ANION GAP SERPL CALC-SCNC: 7 MMOL/L (ref 3–18)
AST SERPL-CCNC: 21 U/L (ref 10–38)
BILIRUB SERPL-MCNC: 0.5 MG/DL (ref 0.2–1)
BUN SERPL-MCNC: 11 MG/DL (ref 7–18)
BUN/CREAT SERPL: 13 (ref 12–20)
CALCIUM SERPL-MCNC: 8.7 MG/DL (ref 8.5–10.1)
CHLORIDE SERPL-SCNC: 111 MMOL/L (ref 100–111)
CHOLEST SERPL-MCNC: 105 MG/DL
CO2 SERPL-SCNC: 26 MMOL/L (ref 21–32)
CREAT SERPL-MCNC: 0.84 MG/DL (ref 0.6–1.3)
GLOBULIN SER CALC-MCNC: 3.6 G/DL (ref 2–4)
GLUCOSE SERPL-MCNC: 96 MG/DL (ref 74–99)
HDLC SERPL-MCNC: 26 MG/DL (ref 40–60)
HDLC SERPL: 4 {RATIO} (ref 0–5)
LDLC SERPL CALC-MCNC: 60.4 MG/DL (ref 0–100)
LIPID PROFILE,FLP: ABNORMAL
POTASSIUM SERPL-SCNC: 4.7 MMOL/L (ref 3.5–5.5)
PROT SERPL-MCNC: 7.3 G/DL (ref 6.4–8.2)
SODIUM SERPL-SCNC: 144 MMOL/L (ref 136–145)
TRIGL SERPL-MCNC: 93 MG/DL (ref ?–150)
VLDLC SERPL CALC-MCNC: 18.6 MG/DL

## 2020-09-08 PROCEDURE — 36415 COLL VENOUS BLD VENIPUNCTURE: CPT

## 2020-09-08 PROCEDURE — 80061 LIPID PANEL: CPT

## 2020-09-08 PROCEDURE — 80053 COMPREHEN METABOLIC PANEL: CPT

## 2020-09-09 NOTE — PROGRESS NOTES
Received patient from Acute Rehab at [phone], patient is alert, but seems to be extremely restless and and anxious, he denies any acute distress,SOB and chest pain. Patient seems to be in stable condition, vitals WNL, no apparent distress noted, no neurological deficit noted. Patient was educated on using the call bell to call for assistance, call bell was placed within reach, will continue to monitor. Electrodesiccation Text: The wound bed was treated with electrodesiccation after the biopsy was performed.

## 2020-09-15 ENCOUNTER — TELEPHONE (OUTPATIENT)
Dept: FAMILY MEDICINE CLINIC | Age: 60
End: 2020-09-15

## 2020-09-15 ENCOUNTER — OFFICE VISIT (OUTPATIENT)
Dept: FAMILY MEDICINE CLINIC | Age: 60
End: 2020-09-15

## 2020-09-15 VITALS
BODY MASS INDEX: 30.06 KG/M2 | HEIGHT: 70 IN | WEIGHT: 210 LBS | HEART RATE: 58 BPM | TEMPERATURE: 98 F | OXYGEN SATURATION: 98 % | SYSTOLIC BLOOD PRESSURE: 122 MMHG | DIASTOLIC BLOOD PRESSURE: 62 MMHG | RESPIRATION RATE: 16 BRPM

## 2020-09-15 DIAGNOSIS — I11.0 BENIGN HYPERTENSIVE HEART DISEASE WITH SYSTOLIC CONGESTIVE HEART FAILURE, NYHA CLASS 2 (HCC): ICD-10-CM

## 2020-09-15 DIAGNOSIS — I73.9 PERIPHERAL ARTERY DISEASE (HCC): Primary | ICD-10-CM

## 2020-09-15 DIAGNOSIS — Z86.39 HISTORY OF VITAMIN D DEFICIENCY: ICD-10-CM

## 2020-09-15 DIAGNOSIS — I50.20 BENIGN HYPERTENSIVE HEART DISEASE WITH SYSTOLIC CONGESTIVE HEART FAILURE, NYHA CLASS 2 (HCC): ICD-10-CM

## 2020-09-15 DIAGNOSIS — I70.213 ATHEROSCLEROSIS OF NATIVE ARTERY OF BOTH LOWER EXTREMITIES WITH INTERMITTENT CLAUDICATION (HCC): ICD-10-CM

## 2020-09-15 DIAGNOSIS — Z89.612 STATUS POST ABOVE-KNEE AMPUTATION OF LEFT LOWER EXTREMITY (HCC): ICD-10-CM

## 2020-09-15 DIAGNOSIS — E78.5 DYSLIPIDEMIA: ICD-10-CM

## 2020-09-15 DIAGNOSIS — I25.10 CORONARY ARTERY DISEASE INVOLVING NATIVE CORONARY ARTERY OF NATIVE HEART WITHOUT ANGINA PECTORIS: ICD-10-CM

## 2020-09-15 NOTE — TELEPHONE ENCOUNTER
Have you been diagnosed with, tested for, or told that you are suspected of having COVID-19 (coronovirus)? Have you had a fever or taken medication to treat a fever in the past 72 hours? Have you had a cough, SOB, or flu-like symptoms within the past 3 days? Have you had direct contact with someone who tested positive for COVID-19 within the past 14 days? Do you have a household member with flu-like symptoms, including fever, cough, or SOB? Do you currently have flu-like symptoms including fever, cough, or SOB?         ALL ANSWERS TO THE ABOVE QUESTIONS IS NO Detail Level: Detailed Patient Specific Counseling (Will Not Stick From Patient To Patient): Recommended warm compresses and see an eye doctor if it gets worse

## 2020-09-15 NOTE — PATIENT INSTRUCTIONS
A Healthy Heart: Care Instructions  Your Care Instructions     Coronary artery disease, also called heart disease, occurs when a substance called plaque builds up in the vessels that supply oxygen-rich blood to your heart muscle. This can narrow the blood vessels and reduce blood flow. A heart attack happens when blood flow is completely blocked. A high-fat diet, smoking, and other factors increase the risk of heart disease. Your doctor has found that you have a chance of having heart disease. You can do lots of things to keep your heart healthy. It may not be easy, but you can change your diet, exercise more, and quit smoking. These steps really work to lower your chance of heart disease. Follow-up care is a key part of your treatment and safety. Be sure to make and go to all appointments, and call your doctor if you are having problems. It's also a good idea to know your test results and keep a list of the medicines you take. How can you care for yourself at home? Diet    · Use less salt when you cook and eat. This helps lower your blood pressure. Taste food before salting. Add only a little salt when you think you need it. With time, your taste buds will adjust to less salt.     · Eat fewer snack items, fast foods, canned soups, and other high-salt, high-fat, processed foods.     · Read food labels and try to avoid saturated and trans fats. They increase your risk of heart disease by raising cholesterol levels.     · Limit the amount of solid fat-butter, margarine, and shortening-you eat. Use olive, peanut, or canola oil when you cook. Bake, broil, and steam foods instead of frying them.     · Eat a variety of fruit and vegetables every day. Dark green, deep orange, red, or yellow fruits and vegetables are especially good for you. Examples include spinach, carrots, peaches, and berries.     · Foods high in fiber can reduce your cholesterol and provide important vitamins and minerals.  High-fiber foods include whole-grain cereals and breads, oatmeal, beans, brown rice, citrus fruits, and apples.     · Eat lean proteins. Heart-healthy proteins include seafood, lean meats and poultry, eggs, beans, peas, nuts, seeds, and soy products.     · Limit drinks and foods with added sugar. These include candy, desserts, and soda pop. Lifestyle changes    · If your doctor recommends it, get more exercise. Walking is a good choice. Bit by bit, increase the amount you walk every day. Try for at least 30 minutes on most days of the week. You also may want to swim, bike, or do other activities.     · Do not smoke. If you need help quitting, talk to your doctor about stop-smoking programs and medicines. These can increase your chances of quitting for good. Quitting smoking may be the most important step you can take to protect your heart. It is never too late to quit.     · Limit alcohol to 2 drinks a day for men and 1 drink a day for women. Too much alcohol can cause health problems.     · Manage other health problems such as diabetes, high blood pressure, and high cholesterol. If you think you may have a problem with alcohol or drug use, talk to your doctor. Medicines    · Take your medicines exactly as prescribed. Call your doctor if you think you are having a problem with your medicine.     · If your doctor recommends aspirin, take the amount directed each day. Make sure you take aspirin and not another kind of pain reliever, such as acetaminophen (Tylenol). When should you call for help? Call 911 if you have symptoms of a heart attack. These may include:    · Chest pain or pressure, or a strange feeling in the chest.     · Sweating.     · Shortness of breath.     · Pain, pressure, or a strange feeling in the back, neck, jaw, or upper belly or in one or both shoulders or arms.     · Lightheadedness or sudden weakness.     · A fast or irregular heartbeat.    After you call 911, the  may tell you to chew 1 adult-strength or 2 to 4 low-dose aspirin. Wait for an ambulance. Do not try to drive yourself. Watch closely for changes in your health, and be sure to contact your doctor if you have any problems. Where can you learn more? Go to http://yuniel-lyndsay.info/  Enter F075 in the search box to learn more about \"A Healthy Heart: Care Instructions. \"  Current as of: December 16, 2019               Content Version: 12.6  © 7777-7392 [employer]. Care instructions adapted under license by [employer] (which disclaims liability or warranty for this information). If you have questions about a medical condition or this instruction, always ask your healthcare professional. [address] any warranty or liability for your use of this information.

## 2020-09-15 NOTE — PROGRESS NOTES
1. Have you been to the ER, urgent care clinic since your last visit? Hospitalized since your last visit? No    2. Have you seen or consulted any other health care providers outside of the [address] [place] [address] since your last visit? Include any pap smears or colon screening.  No

## 2020-09-15 NOTE — PROGRESS NOTES
Melissa Johns, 56 y.o.,  male    SUBJECTIVE  Ff-up    No new concerns     PAD bilateral- s/p AKA Left leg and complicated revasculariation (2017). Doing well. Continues to see vascular surgery q6m and patent flow on recent visit. Continues to take gabapentin dose leg pain. He keeps active, working, driving. Continues to followi with podiatry    H/o CAD s/p angioplasty and Afib.h/o of GI bleeding on coumadin  Denies cp, syncope, palpitations, on amiodarone asa/plavix. he is following dr. Julianne Wong. HTN- on BB, arb,  Lasix.     Discussed CRCS- pt prefers to hold off    Declines flu/PCV vaccines    ROS:  See HPI, all others negative        Patient Active Problem List   Diagnosis Code    Benign hypertensive heart disease with systolic congestive heart failure, NYHA class 2 ([facility]) I11.0, I50.20    DDD (degenerative disc disease), lumbar M51.36    Erectile dysfunction N52.9    Spinal stenosis of lumbar region with radiculopathy M48.061, M54.16    Hereditary peripheral neuropathy G60.9    Aortoiliac occlusive disease ([address] I74.09    Atherosclerosis of native artery of both lower extremities with intermittent claudication ([address] I70.213    Peripheral artery disease ([address] I73.9    Coronary artery disease involving native coronary artery of native heart I25.10    Paroxysmal atrial fibrillation ([facility]) I48.0    Acute blood loss as cause of postoperative anemia D62    Impaired mobility and ADLs Z74.09, Z78.9    Ischemic cardiomyopathy I25.5    Chronic systolic heart failure (HCC) I50.22    Carotid artery disease ([facility]) I77.9    Dyslipidemia E78.5    Euthyroid sick syndrome E07.81    Aftercare following surgery of the circulatory system Z48.812    Status post femoral-popliteal bypass surgery Z95.828    History of cardioversion Z98.890    Critical ischemia of lower extremity I99.8    History of vitamin D deficiency Z86.39    Pseudoaneurysm of femoral artery ([facility]) I72.4    Infection of vascular bypass graft ([facility] T82. 7XXA    Chronic anemia D64.9    Chronic ulcer of right heel ([facility]) L97.419    Ischemic rest pain of lower extremity ([facility]) I73.9    Prolonged Q-T interval on ECG R94.31    Status post above-knee amputation of left lower extremity ([facility] F24.261    Aftercare for amputation stump Z47.81    Moderate to severe pulmonary hypertension ([facility]) I27.20    Adverse effect of amiodarone T46.2X5A    Skin ulcer of malleolar area of right ankle ([facility]) L97.319       Current Outpatient Medications   Medication Sig Dispense Refill    furosemide (LASIX) 20 mg tablet TAKE 1 TABLET DAILY 90 Tab 1    metoprolol tartrate (LOPRESSOR) 25 mg tablet TAKE 1 TABLET EVERY 12     HOURS 180 Tab 1    aspirin 81 mg chewable tablet Take 1 Tab by mouth daily. 90 Tab 3    clopidogrel (PLAVIX) 75 mg tab take 1 tablet by mouth once daily 90 Tab 3    losartan (COZAAR) 100 mg tablet take 1 tablet by mouth once daily 30 Tab 6    atorvastatin (LIPITOR) 80 mg tablet take 1 tablet by mouth once daily 90 Tab 3    gabapentin (NEURONTIN) 400 mg capsule take 1 capsule by mouth three times a day 270 Cap 0    multivitamin (ONE A DAY) tablet Take 1 Tab by mouth daily. Allergies   Allergen Reactions    Morphine Other (comments)     Patient gets confused with morphine.        Past Medical History:   Diagnosis Date    Acute blood loss as cause of postoperative anemia 4/4/2017    Anticoagulated on Coumadin     Aortoiliac occlusive disease (HCC) 1/25/2017    Atherosclerosis of native artery of both lower extremities with intermittent claudication ([facility] 1/25/2017    Benign hypertensive heart disease with systolic congestive heart failure, NYHA class 2 ([facility] 1/26/2015    Carotid artery disease (HCC)     Chronic anemia     Chronic systolic heart failure (HCC)     Chronic ulcer of right foot ([facility] 4/4/2017    Chronic ulcer of right heel ([facility] 8/8/2017    Coronary artery disease involving native coronary artery of native heart 3/15/2017    Successful stenting of Cx (Xience LESLEY) and RCA (Xience LESLEY) to 0% by Dr. Mckinley De La Fuente on 3/15/17.  DDD (degenerative disc disease), lumbar 1/26/2015    Dyslipidemia     Erectile dysfunction 7/5/2016    Euthyroid sick syndrome 4/6/2017    Hereditary peripheral neuropathy 11/15/2016    History of cardioversion 4/18/2017    S/P Synchronized external cardioversion (4/18/2017 - Dr. Char Cook)    History of vitamin D deficiency 4/22/2017    Vitamin D 25-Hydroxy (4/22/2017) = 12.0; Vitamin D 25-Hydroxy (5/26/2017) = 38.7    Infection of vascular bypass graft ([facility] 7/24/2017    Left lower extremity    Ischemic cardiomyopathy     Moderate to severe pulmonary hypertension ([address] 7/23/2017    Paroxysmal atrial fibrillation (HCC)     Peripheral artery disease ([address] 1/25/2017    Skin ulcer of malleolar area of right ankle ([address]     Spinal stenosis of lumbar region with radiculopathy 5/4/2015    Dr. Edna Hendricks       Social History     Socioeconomic History    Marital status: LEGALLY      Spouse name: Not on file    Number of children: Not on file    Years of education: Not on file    Highest education level: Not on file   Occupational History    Not on file   Social Needs    Financial resource strain: Not on file    Food insecurity     Worry: Not on file     Inability: Not on file    Transportation needs     Medical: Not on file     Non-medical: Not on file   Tobacco Use    Smoking status: Former Smoker    Smokeless tobacco: Never Used    Tobacco comment: quit in 2011   Substance and Sexual Activity    Alcohol use:  Yes     Alcohol/week: 2.0 standard drinks     Types: 2 Cans of beer per week     Comment: 10 cans of beer a month    Drug use: Yes     Types: Marijuana     Comment: occasionally-last used 4/17    Sexual activity: Yes     Partners: Female   Lifestyle    Physical activity     Days per week: Not on file     Minutes per session: Not on file    Stress: Not on file Relationships    Social connections     Talks on phone: Not on file     Gets together: Not on file     Attends Mandaen service: Not on file     Active member of club or organization: Not on file     Attends meetings of clubs or organizations: Not on file     Relationship status: Not on file    Intimate partner violence     Fear of current or ex partner: Not on file     Emotionally abused: Not on file     Physically abused: Not on file     Forced sexual activity: Not on file   Other Topics Concern    Not on file   Social History Narrative    Not on file       Family History   Problem Relation Age of Onset    Heart Disease Father          OBJECTIVE    Physical Exam:     Visit Vitals  /62 (BP 1 Location: Left arm, BP Patient Position: Sitting)   Pulse (!) 58   Temp 98 °F (36.7 °C) (Oral)   Resp 16   Ht 5' 10\" (1.778 m)   Wt 210 lb (95.3 kg)   SpO2 98%   BMI 30.13 kg/m²       General: alert, in no apparent distress or pain  Neck: supple, no adenopathy palpated  CVS: normal rate, regular rhythm, distinct S1 and S2  Lungs:clear to ausculation bilaterally, no crackles, wheezing or rhonchi noted  Extremities: L AKA  W/ prosthesis  Psych:  mood and affect normal    Results for orders placed or performed during the hospital encounter of 81/91/81   METABOLIC PANEL, COMPREHENSIVE   Result Value Ref Range    Sodium 144 136 - 145 mmol/L    Potassium 4.7 3.5 - 5.5 mmol/L    Chloride 111 100 - 111 mmol/L    CO2 26 21 - 32 mmol/L    Anion gap 7 3.0 - 18 mmol/L    Glucose 96 74 - 99 mg/dL    BUN 11 7.0 - 18 MG/DL    Creatinine 0.84 0.6 - 1.3 MG/DL    BUN/Creatinine ratio 13 12 - 20      GFR est AA >60 >60 ml/min/1.73m2    GFR est non-AA >60 >60 ml/min/1.73m2    Calcium 8.7 8.5 - 10.1 MG/DL    Bilirubin, total 0.5 0.2 - 1.0 MG/DL    ALT (SGPT) 25 16 - 61 U/L    AST (SGOT) 21 10 - 38 U/L    Alk.  phosphatase 133 (H) 45 - 117 U/L    Protein, total 7.3 6.4 - 8.2 g/dL    Albumin 3.7 3.4 - 5.0 g/dL    Globulin 3.6 2.0 - 4.0 g/dL    A-G Ratio 1.0 0.8 - 1.7     LIPID PANEL   Result Value Ref Range    LIPID PROFILE          Cholesterol, total 105 <200 MG/DL    Triglyceride 93 <150 MG/DL    HDL Cholesterol 26 (L) 40 - 60 MG/DL    LDL, calculated 60.4 0 - 100 MG/DL    VLDL, calculated 18.6 MG/DL    CHOL/HDL Ratio 4.0 0 - 5.0     '    ASSESSMENT/PLAN  Diagnoses and all orders for this visit:    PAD (peripheral artery disease) ([facility]  Doing well  S/p L AKA (3293), and complicated revascularization  plavix, asa, statin, gabapentin for pain. Following Vasc surgery and podiatry    Benign hypertensive heart disease with systolic congestive heart failure, NYHA class 2 ([facility]  Appears compensated  Cont lasix, on BB and ARB  Cardiology dr. Brisa Stearns following     Coronary artery disease involving native coronary artery of native heart without angina pectoris  S/p stent 2017  On ASA, plavix, BB, ARB, statin  following     Dyslipidemia  LDL goal is <70,   On lipitor 80 mg  Lipid panel 9/2020  cmp prior to next visit     Status post above knee amputation of left lower extremity ([facility]    History of Vitamin D deficiency  Check vitamin D prior to next visit    Ff-up in 6 months or sooner prn    Patient understands plan of care. Patient has provided input and agrees with goals.

## 2020-10-20 RX ORDER — METOPROLOL TARTRATE 25 MG/1
TABLET, FILM COATED ORAL
Qty: 180 TAB | Refills: 1 | Status: SHIPPED | OUTPATIENT
Start: 2020-10-20 | End: 2021-04-12

## 2020-10-27 ENCOUNTER — OFFICE VISIT (OUTPATIENT)
Dept: CARDIOLOGY CLINIC | Age: 60
End: 2020-10-27
Payer: COMMERCIAL

## 2020-10-27 VITALS
HEIGHT: 70 IN | SYSTOLIC BLOOD PRESSURE: 140 MMHG | HEART RATE: 81 BPM | BODY MASS INDEX: 29.49 KG/M2 | WEIGHT: 206 LBS | OXYGEN SATURATION: 98 % | DIASTOLIC BLOOD PRESSURE: 90 MMHG

## 2020-10-27 DIAGNOSIS — I48.0 PAROXYSMAL ATRIAL FIBRILLATION (HCC): ICD-10-CM

## 2020-10-27 DIAGNOSIS — I11.9 HYPERTENSIVE HEART DISEASE, UNSPECIFIED WHETHER HEART FAILURE PRESENT: ICD-10-CM

## 2020-10-27 DIAGNOSIS — I70.213 ATHEROSCLEROSIS OF NATIVE ARTERY OF BOTH LOWER EXTREMITIES WITH INTERMITTENT CLAUDICATION (HCC): ICD-10-CM

## 2020-10-27 DIAGNOSIS — I70.213 ATHEROSCLEROSIS OF NATIVE ARTERY OF BOTH LOWER EXTREMITIES WITH INTERMITTENT CLAUDICATION (HCC): Primary | ICD-10-CM

## 2020-10-27 DIAGNOSIS — I73.9 PERIPHERAL ARTERY DISEASE (HCC): ICD-10-CM

## 2020-10-27 DIAGNOSIS — E78.5 DYSLIPIDEMIA: ICD-10-CM

## 2020-10-27 DIAGNOSIS — I25.5 ISCHEMIC CARDIOMYOPATHY: ICD-10-CM

## 2020-10-27 PROCEDURE — 93000 ELECTROCARDIOGRAM COMPLETE: CPT | Performed by: INTERNAL MEDICINE

## 2020-10-27 PROCEDURE — 99214 OFFICE O/P EST MOD 30 MIN: CPT | Performed by: INTERNAL MEDICINE

## 2020-10-27 NOTE — PROGRESS NOTES
HPI:  I saw Jovanni Lee in my office today in cardiovascular evaluation regarding his problems with a dilated cardiomyopathy and paroxysmal atrial fibrillation.  Mr. Rossana Lee is a pleasant 56year-old  male with a nonischemic cardiomyopathy.  Historically, he has had multiple issues including dyslipidemia, peripheral vascular disease status post a left AKA amputation, paroxysmal atrial fibrillation for which he has been on Coumadin in the past and underwent a cardioversion back in April 2017, and coronary artery disease with a cardiac catheterization back on March 8, 2017 demonstrating mild disease except for proximal 50% obstruction and healed 90% trifurcation lesion in the mid circumflex as well as a 50% diffuse proximal and 90% mid right coronary artery obstruction with successful stenting of the circumflex and the RCA on March 15, 2017 using Xience drug-eluting stents. He comes in today and relates that he is continued to do quite well. He is remaining quite active and denies any cardiovascular symptomatology at this time. Encounter Diagnoses   Name Primary?  Atherosclerosis of native artery of both lower extremities with intermittent claudication (HCC) Yes    Ischemic cardiomyopathy     Paroxysmal atrial fibrillation ([address] remaining in sinus rhythm     Dyslipidemia     Peripheral artery disease ([address]     Hypertensive heart disease, unspecified whether heart failure present        Discussion: This patient appears to be doing about as well as we could expect and I really have no recommendations for change currently. He is not having any symptoms to suggest the development of rate current symptomatic ischemic heart disease, any overt heart failure, or any history to suggest recurrent paroxysmal atrial fibrillation. Been over 3 years since he had his team procedure so I would like to get a screening nuclear myocardial perfusion study completed.     He does have significant peripheral vascular disease status post a left AKA amputation significant carotid disease with known totally occluded right internal carotid artery, but he is being followed through Dr. Benjamin Noriega office every 6 months for these problems. His latest lipid profile which was completed on September 8, 2020 demonstrated total cholesterol of 105, triglycerides of 93, HDL of 26, LDL of 60.4, and VLDL of 18.6 which I think is very good control on Lipitor 80 mg daily. His blood pressure is borderline elevated today at 140/90 when checked by my staff I checked again myself and got 140/90f. However, he was my first patient and had just taken his medication about an hour prior to his visit so I am not going to make any changes at this time. I will review his nuclear myocardial perfusion study with further recommendations pending that review and since I will be retiring in the next few months he will be followed up again in several months by my associate Dr. James Crespo who did his original stenting procedure. PCP: Maddy Kim MD      Past Medical History:   Diagnosis Date    Acute blood loss as cause of postoperative anemia 4/4/2017    Anticoagulated on Coumadin     Aortoiliac occlusive disease ([address] 1/25/2017    Atherosclerosis of native artery of both lower extremities with intermittent claudication ([address] 1/25/2017    Benign hypertensive heart disease with systolic congestive heart failure, NYHA class 2 ([address] 1/26/2015    Carotid artery disease (HCC)     Chronic anemia     Chronic systolic heart failure (HCC)     Chronic ulcer of right foot ([address] 4/4/2017    Chronic ulcer of right heel ([address] 8/8/2017    Coronary artery disease involving native coronary artery of native heart 3/15/2017    Successful stenting of Cx (Xience LESLEY) and RCA (Xience LESLEY) to 0% by Dr. James Crespo on 3/15/17.     DDD (degenerative disc disease), lumbar 1/26/2015    Dyslipidemia     Erectile dysfunction 7/5/2016    Euthyroid sick syndrome 4/6/2017    Hereditary peripheral neuropathy 11/15/2016    History of cardioversion 4/18/2017    S/P Synchronized external cardioversion (4/18/2017 - Dr. Layla Prescott)    History of vitamin D deficiency 4/22/2017    Vitamin D 25-Hydroxy (4/22/2017) = 12.0; Vitamin D 25-Hydroxy (5/26/2017) = 38.7    Infection of vascular bypass graft ([facility] 7/24/2017    Left lower extremity    Ischemic cardiomyopathy     Moderate to severe pulmonary hypertension ([address] 7/23/2017    Paroxysmal atrial fibrillation (HCC)     Peripheral artery disease ([address] 1/25/2017    Skin ulcer of malleolar area of right ankle ([address]     Spinal stenosis of lumbar region with radiculopathy 5/4/2015    Dr. Sallie Cherry       Past Surgical History:   Procedure Laterality Date    CARDIAC CATHETERIZATION  3/8/2017         CARDIAC CATHETERIZATION  3/15/2017         CORONARY STENT SINGLE W/PTCA  3/15/2017         HX ABOVE KNEE AMPUTATION Left 08/18/2017    S/P Left above-the-knee amputation (8/18/2017 - Dr. Hira Rodriguez)    HX ATHERECTOMY Left 06/15/2017    S/P Atherectomy and balloon angioplasty of the left superficial femoral artery and above-knee popliteal artery (6/15/2017 - Dr. Hira Rodirguez)    HX ATHERECTOMY Right 09/07/2017    S/P Atherectomy and balloon angioplasty of the below-the-knee popliteal artery, above-the-knee popliteal artery, tibioperoneal trunk artery and posterior tibial artery (9/7/2017 - Dr. Hira Rodriguez)    HX COLONOSCOPY  07/23/2015    polyps repeat 2020 5 years [address] Right 04/04/2017    S/P Right axillary to bifemoral artery bypass using an 8 mm Propaten graft from the right axilla to the right common femoral artery with a jump femoral-femoral bypass with another 8 mm Propaten external ring bypass; Right common femoral artery, and profunda femoris artery and superficial femoral artery endarterectomy causing an extensive surgery on the right side (4/4/2017 -  Marsh Severs)    HX FEMORAL BYPASS Right 04/07/2017    S/P Cutdown of femoral-femoral bypass, more on the left groin side; Right lower extremity angiography with first-order catheterization (4/7/2017 - Dr. Lee Naqvi)    HX FEMORAL BYPASS Right 04/10/2017    S/P Right femoral to above-knee popliteal bypass with an 8 mm PTFE graft (4/10/2017 - Dr. Pedro Whitmore)    HX OTHER SURGICAL Left 07/25/2017    S/P Removal of infected graft; Repair of left superficial femoral artery; Repair of left common femoral artery (7/25/2017 - Dr. Lee Naqvi)    HX OTHER SURGICAL Right 06/27/2017    S/P Drainage of right side abscess (6/27/2017 - Dr. Lee Naqvi)    HX OTHER SURGICAL Left 07/01/2017    S/P Left groin exploration; Left femoral repair pseudoaneurysm; Left wound washout; Wound VAC placement (7/01/2017 - Dr. Lee Naqvi)    HX OTHER SURGICAL Left 07/04/2017    S/P Left common femoral artery ligation; Jump bypass from right to left fem-fem to a more distal superficial femoral artery using 8 mm external ringed Propaten graft; Left groin wound exploration and washout (7/4/2017 - Dr. Lee Naqvi)    HX OTHER SURGICAL Right 08/18/2017    S/P Right axillary femoral jump bypass interposition; Removal of infected right axillary femoral bypass; Wound VAC placement of upper thigh 1.2 cm x 5.2 cm x 1.8 cm and groin 4 cm x 0.5 cm x 0.2 cm (8/18/2017 - Dr. Lee Naqvi)       Current Outpatient Medications   Medication Sig    metoprolol tartrate (LOPRESSOR) 25 mg tablet TAKE 1 TABLET EVERY 12     HOURS    atorvastatin (LIPITOR) 80 mg tablet TAKE 1 TABLET ONCE DAILY    losartan (COZAAR) 100 mg tablet TAKE 1 TABLET ONCE DAILY    furosemide (LASIX) 20 mg tablet TAKE 1 TABLET DAILY    aspirin 81 mg chewable tablet Take 1 Tab by mouth daily.     clopidogrel (PLAVIX) 75 mg tab take 1 tablet by mouth once daily    gabapentin (NEURONTIN) 400 mg capsule take 1 capsule by mouth three times a day    multivitamin (ONE A DAY) tablet Take 1 Tab by mouth daily. No current facility-administered medications for this visit. Allergies   Allergen Reactions    Morphine Other (comments)     Patient gets confused with morphine. Social History :  Social History     Tobacco Use    Smoking status: Former Smoker    Smokeless tobacco: Never Used    Tobacco comment: quit in 2011   Substance Use Topics    Alcohol use: Yes     Alcohol/week: 2.0 standard drinks     Types: 2 Cans of beer per week     Comment: 10 cans of beer a month        Family History: family history includes Heart Disease in his father. Review of Systems:   Constitutional: Negative. Respiratory: Negative. Cardiovascular: Negative. Gastrointestinal: Negative. Musculoskeletal: Negative. Neurological: Negative. Physical Exam:    The patient is a cooperative, alert, well developed, well nourished 56 y.o.  male who is in no acute distress at the time of the examination. Visit Vitals  BP (!) 140/90   Pulse 81   Ht 5' 10\" (1.778 m)   Wt 93.4 kg (206 lb)   SpO2 98%   BMI 29.56 kg/m²       HEENT: Conjuctiva white, mucosa moist, no pallor or cyanosis. NECK: Supple without masses, tenderness or thyromegaly. There was no jugular venous distention. Carotid are full bilaterally without bruits. CHEST: Symmetrical with good excursion. LUNGS: Clear to auscultation in all fields. HEART: The apex is not displaced. There were no lifts, thrills or heaves. There is a normal S1 and S2 without appreciable murmurs, rubs, clicks, or gallops auscultated. ABDOMEN: Soft without masses, tenderness or organomegaly. EXTREMITIES: 1 + peripheral pulses on the right leg without edema and left AKA.     Review of Data: See PMH and Cardiology and Imaging sections for cardiac testing  Lab Results   Component Value Date/Time    Cholesterol, total 105 09/08/2020 07:09 AM    HDL Cholesterol 26 (L) 09/08/2020 07:09 AM LDL, calculated 60.4 09/08/2020 07:09 AM    Triglyceride 93 09/08/2020 07:09 AM    CHOL/HDL Ratio 4.0 09/08/2020 07:09 AM       Results for orders placed or performed in visit on 10/27/20   AMB POC EKG ROUTINE W/ 12 LEADS, INTER & REP     Status: None    Narrative    Normal sinus rhythm, rate 81. Occasional PVCs. Compared to the EKG of October 1, 2020 there was no significant herbal change. Yady Christian D.O., F.A.C.C. [facility]  [address] [place] [address] and [facility]  [address] [place] [address]. [address]      PLEASE NOTE:  This document has been produced using voice recognition software. Unrecognized errors in transcription may be present.

## 2020-11-09 ENCOUNTER — TELEPHONE (OUTPATIENT)
Dept: CARDIOLOGY CLINIC | Age: 60
End: 2020-11-09

## 2020-11-10 LAB
STRESS BASELINE DIAS BP: 80 MMHG
STRESS BASELINE HR: 77 BPM
STRESS BASELINE SYS BP: 140 MMHG
STRESS PEAK DIAS BP: 70 MMHG
STRESS PEAK SYS BP: 140 MMHG
STRESS PERCENT HR ACHIEVED: 70 %
STRESS POST PEAK HR: 110 BPM
STRESS RATE PRESSURE PRODUCT: NORMAL BPM*MMHG
STRESS ST DEPRESSION: 0 MM
STRESS ST ELEVATION: 0 MM
STRESS STAGE 1 BP: NORMAL MMHG
STRESS STAGE 1 DURATION: 3 MIN:SEC
STRESS STAGE 1 HR: 110 BPM
STRESS STAGE RECOVERY 1 BP: NORMAL MMHG
STRESS STAGE RECOVERY 1 DURATION: 1 MIN:SEC
STRESS STAGE RECOVERY 1 HR: 90 BPM
STRESS TARGET HR: 158 BPM

## 2020-11-11 NOTE — PROGRESS NOTES
Per your note - Discussion: This patient appears to be doing about as well as we could expect and I really have no recommendations for change currently. He is not having any symptoms to suggest the development of rate current symptomatic ischemic heart disease, any overt heart failure, or any history to suggest recurrent paroxysmal atrial fibrillation. Been over 3 years since he had his team procedure so I would like to get a screening nuclear myocardial perfusion study completed.

## 2020-11-14 ENCOUNTER — TELEPHONE (OUTPATIENT)
Dept: CARDIOLOGY CLINIC | Age: 60
End: 2020-11-14

## 2020-11-14 NOTE — TELEPHONE ENCOUNTER
----- Message from Bradford Mota LPN sent at 00/72/0952 10:07 AM EST -----  Per your note - Discussion: This patient appears to be doing about as well as we could expect and I really have no recommendations for change currently. He is not having any symptoms to suggest the development of rate current symptomatic ischemic heart disease, any overt heart failure, or any history to suggest recurrent paroxysmal atrial fibrillation. Been over 3 years since he had his team procedure so I would like to get a screening nuclear myocardial perfusion study completed.

## 2020-11-14 NOTE — TELEPHONE ENCOUNTER
This was done as a screening test to rule out silent coronary artery disease and the test appeared to be within normal limits and low risk. Please let him know.  ES

## 2020-12-05 DIAGNOSIS — I11.0 BENIGN HYPERTENSIVE HEART DISEASE WITH SYSTOLIC CONGESTIVE HEART FAILURE, NYHA CLASS 2 (HCC): Chronic | ICD-10-CM

## 2020-12-05 DIAGNOSIS — I50.20 BENIGN HYPERTENSIVE HEART DISEASE WITH SYSTOLIC CONGESTIVE HEART FAILURE, NYHA CLASS 2 (HCC): Chronic | ICD-10-CM

## 2020-12-05 DIAGNOSIS — I50.22 CHRONIC SYSTOLIC HEART FAILURE (HCC): Chronic | ICD-10-CM

## 2020-12-06 RX ORDER — CLOPIDOGREL BISULFATE 75 MG/1
TABLET ORAL
Qty: 90 TAB | Refills: 3 | Status: SHIPPED | OUTPATIENT
Start: 2020-12-06 | End: 2021-04-12

## 2020-12-07 RX ORDER — FUROSEMIDE 20 MG/1
TABLET ORAL
Qty: 90 TAB | Refills: 1 | Status: SHIPPED | OUTPATIENT
Start: 2020-12-07 | End: 2021-05-26

## 2021-03-08 ENCOUNTER — OFFICE VISIT (OUTPATIENT)
Dept: VASCULAR SURGERY | Age: 61
End: 2021-03-08
Payer: COMMERCIAL

## 2021-03-08 VITALS
SYSTOLIC BLOOD PRESSURE: 110 MMHG | HEART RATE: 76 BPM | RESPIRATION RATE: 20 BRPM | OXYGEN SATURATION: 97 % | DIASTOLIC BLOOD PRESSURE: 80 MMHG

## 2021-03-08 DIAGNOSIS — I74.09 AORTOILIAC OCCLUSIVE DISEASE (HCC): ICD-10-CM

## 2021-03-08 DIAGNOSIS — I65.22 STENOSIS OF LEFT CAROTID ARTERY: ICD-10-CM

## 2021-03-08 DIAGNOSIS — I65.21 CAROTID OCCLUSION, RIGHT: ICD-10-CM

## 2021-03-08 DIAGNOSIS — Z89.612 S/P AKA (ABOVE KNEE AMPUTATION), LEFT (HCC): Primary | ICD-10-CM

## 2021-03-08 DIAGNOSIS — I73.9 PAD (PERIPHERAL ARTERY DISEASE) (HCC): ICD-10-CM

## 2021-03-08 PROCEDURE — 99213 OFFICE O/P EST LOW 20 MIN: CPT | Performed by: PHYSICIAN ASSISTANT

## 2021-03-08 NOTE — PROGRESS NOTES
Jennie File    Chief Complaint   Patient presents with    Leg Pain       History and Physical    Jennie Steele is a 56 y.o. male with complicated vascular history and multiple vascular surgeries. He has history of ax-fem bypass with jump fem-fem bypass and right fem-pop bypass in April of 2017. He developed an abscess over his ax-fem bypass requiring I&D and repair of left CFA pseudoaneurysm in June 2017. He unfortunately had blow out of his left femoral anastomosis and had left CFA ligation with jump bypass from right to left fem-fem bypass in July 2017. Shortly after he later developed infection of left groin with exposed graft and underwent removal of infected graft, Repair of superficial femoral artery, left side; Repair of common femoral artery in July 2017. While at rehab, he developed acute LLE ischemia, infection of right axillofemoral bypass graft and was brought back into the hospital in August 2017 and ultimately requiring left AKA and removal of infected right axillary femoral graft with placement of right axillary to femoral jump bypass. He had gangrenous changes to his right foot and underwent right leg angio in September 2017. He then suffered a fall onto his left stump requiring washout of hematoma and revision of stump later that month. He presents to the office today for his 6 month follow up appointment with studies. He is doing quite well at this point. 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 He does ambulate with a prosthetic. He states that he will use crutches to ambulate if he has to go a long distance but otherwise he ambulates with a cane. He is not complaining of any pain in the office today. He does state that he has started to ride a 3 wheeled bicycle and he does almost a 2 mile route. He does report that with the cold rainy weather he has not been riding daily but is trying to increase his activity as the weather improves.   He does not complaining of any claudication or rest pain of the right leg today. He has no skin changes to the right lower extremity or ulcers. No fever/chills. He also has known right ICA occlusion and mild left ICA stenosis. He denies any vision changes or stroke like symptoms. His follow up studies show \"Patent right femoral to popliteal artery bypass graft with thrombus visualized in the proximal, mid and distal segments of the graft. Increase in velocities at the distal anastomosis suggestive of moderate stenosis by criteria. Multiphasic flow throughout. The right resting MARINA is mildly decreased\". \"Patent axillary-femoral bypass graft without significant stenosis and multiphasic flow. Large non vascular perigraft collection at the level of the umbilicus measuring approximately 2.5 x 3.2cm trv. The exam was compared to the study performed on 8/14/2020. Compared to prior exam, there is no significant change\". \"The right ICA is occluded. The right vertebral is antegrade. There is mild stenosis in the left ICA (<50%) and at the proximal segment  The left vertebral is occluded\". Past Medical History:   Diagnosis Date    Acute blood loss as cause of postoperative anemia 4/4/2017    Anticoagulated on Coumadin     Aortoiliac occlusive disease (HCC) 1/25/2017    Atherosclerosis of native artery of both lower extremities with intermittent claudication ([address] 1/25/2017    Benign hypertensive heart disease with systolic congestive heart failure, NYHA class 2 ([address] 1/26/2015    Carotid artery disease (HCC)     Chronic anemia     Chronic systolic heart failure (HCC)     Chronic ulcer of right foot ([address] 4/4/2017    Chronic ulcer of right heel ([address] 8/8/2017    Coronary artery disease involving native coronary artery of native heart 3/15/2017    Successful stenting of Cx (Xience LESLEY) and RCA (Xience LESLEY) to 0% by Dr. Marialuisa Lackey on 3/15/17.     DDD (degenerative disc disease), lumbar 1/26/2015    Dyslipidemia     Erectile dysfunction 7/5/2016    Euthyroid sick syndrome 4/6/2017    Hereditary peripheral neuropathy 11/15/2016    History of cardioversion 4/18/2017    S/P Synchronized external cardioversion (4/18/2017 - Dr. Brian Jett)    History of vitamin D deficiency 4/22/2017    Vitamin D 25-Hydroxy (4/22/2017) = 12.0; Vitamin D 25-Hydroxy (5/26/2017) = 38.7    Infection of vascular bypass graft ([facility] 7/24/2017    Left lower extremity    Ischemic cardiomyopathy     Moderate to severe pulmonary hypertension ([address] 7/23/2017    Paroxysmal atrial fibrillation ([address]     Peripheral artery disease ([facility] 1/25/2017    Skin ulcer of malleolar area of right ankle ([address]     Spinal stenosis of lumbar region with radiculopathy 5/4/2015    Dr. Gurpreet Kumar     Past Surgical History:   Procedure Laterality Date    CARDIAC CATHETERIZATION  3/8/2017         CARDIAC CATHETERIZATION  3/15/2017         CORONARY STENT SINGLE W/PTCA  3/15/2017         HX ABOVE KNEE AMPUTATION Left 08/18/2017    S/P Left above-the-knee amputation (8/18/2017 - Dr. Stewart Alberto)    HX ATHERECTOMY Left 06/15/2017    S/P Atherectomy and balloon angioplasty of the left superficial femoral artery and above-knee popliteal artery (6/15/2017 - Dr. Stewart Alberto)    HX ATHERECTOMY Right 09/07/2017    S/P Atherectomy and balloon angioplasty of the below-the-knee popliteal artery, above-the-knee popliteal artery, tibioperoneal trunk artery and posterior tibial artery (9/7/2017 - Dr. Stewart Alberto)    HX COLONOSCOPY  07/23/2015    polyps repeat 2020 5 years Jeanna Henderson    [address] Right 04/04/2017    S/P Right axillary to bifemoral artery bypass using an 8 mm Propaten graft from the right axilla to the right common femoral artery with a jump femoral-femoral bypass with another 8 mm Propaten external ring bypass; Right common femoral artery, and profunda femoris artery and superficial femoral artery endarterectomy causing an extensive surgery on the right side (4/4/2017 - Dr. Minesh Rivera)   • HX FEMORAL BYPASS Right 04/07/2017    S/P Cutdown of femoral-femoral bypass, more on the left groin side; Right lower extremity angiography with first-order catheterization (4/7/2017 - Dr. Minesh Rivera)   • HX FEMORAL BYPASS Right 04/10/2017    S/P Right femoral to above-knee popliteal bypass with an 8 mm PTFE graft (4/10/2017 - Dr. Gabo Cedeño)   • HX OTHER SURGICAL Left 07/25/2017    S/P Removal of infected graft; Repair of left superficial femoral artery; Repair of left common femoral artery (7/25/2017 - Dr. Minesh Rivera)   • HX OTHER SURGICAL Right 06/27/2017    S/P Drainage of right side abscess (6/27/2017 - Dr. Minesh Rivera)   • HX OTHER SURGICAL Left 07/01/2017    S/P Left groin exploration; Left femoral repair pseudoaneurysm; Left wound washout; Wound VAC placement (7/01/2017 - Dr. Minesh Rivera)   • HX OTHER SURGICAL Left 07/04/2017    S/P Left common femoral artery ligation; Jump bypass from right to left fem-fem to a more distal superficial femoral artery using 8 mm external ringed Propaten graft; Left groin wound exploration and washout (7/4/2017 - Dr. Minesh Rivera)   • HX OTHER SURGICAL Right 08/18/2017    S/P Right axillary femoral jump bypass interposition; Removal of infected right axillary femoral bypass; Wound VAC placement of upper thigh 1.2 cm x 5.2 cm x 1.8 cm and groin 4 cm x 0.5 cm x 0.2 cm (8/18/2017 - Dr. Minesh Rivera)     Patient Active Problem List   Diagnosis Code   • Benign hypertensive heart disease with systolic congestive heart failure, NYHA class 2 ([facility]) I11.0, I50.20   • DDD (degenerative disc disease), lumbar M51.36   • Erectile dysfunction N52.9   • Spinal stenosis of lumbar region with radiculopathy M48.061, M54.16   • Hereditary peripheral neuropathy G60.9   • Aortoiliac occlusive disease ([facility]) I74.09   • Atherosclerosis of native artery of both lower  extremities with intermittent claudication ([facility]) I70.213    Peripheral artery disease ([facility]) I73.9    Coronary artery disease involving native coronary artery of native heart I25.10    Paroxysmal atrial fibrillation ([facility]) I48.0    Acute blood loss as cause of postoperative anemia D62    Impaired mobility and ADLs Z74.09, Z78.9    Ischemic cardiomyopathy I25.5    Chronic systolic heart failure ([facility]) I50.22    Carotid artery disease ([facility]) I77.9    Dyslipidemia E78.5    Euthyroid sick syndrome E07.81    Aftercare following surgery of the circulatory system Z48.812    Status post femoral-popliteal bypass surgery Z95.828    History of cardioversion Z98.890    Critical ischemia of lower extremity I99.8    History of vitamin D deficiency Z86.39    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 7XXA    Chronic anemia D64.9    Chronic ulcer of right heel ([facility]) L97.419    Ischemic rest pain of lower extremity M79.606, I99.8    Prolonged Q-T interval on ECG R94.31    Status post above-knee amputation of left lower extremity ([facility] K75.629    Aftercare for amputation stump Z47.81    Moderate to severe pulmonary hypertension ([facility]) I27.20    Adverse effect of amiodarone T46.2X5A    Skin ulcer of malleolar area of right ankle ([facility]) L97.319     Current Outpatient Medications   Medication Sig Dispense Refill    furosemide (LASIX) 20 mg tablet TAKE 1 TABLET DAILY 90 Tab 1    clopidogreL (PLAVIX) 75 mg tab TAKE 1 TABLET ONCE DAILY 90 Tab 3    metoprolol tartrate (LOPRESSOR) 25 mg tablet TAKE 1 TABLET EVERY 12     HOURS 180 Tab 1    atorvastatin (LIPITOR) 80 mg tablet TAKE 1 TABLET ONCE DAILY 90 Tab 3    losartan (COZAAR) 100 mg tablet TAKE 1 TABLET ONCE DAILY 90 Tab 3    aspirin 81 mg chewable tablet Take 1 Tab by mouth daily.  90 Tab 3    gabapentin (NEURONTIN) 400 mg capsule take 1 capsule by mouth three times a day 270 Cap 0    multivitamin (ONE A DAY) tablet Take 1 Tab by mouth daily. Allergies   Allergen Reactions    Morphine Other (comments)     Patient gets confused with morphine. Physical Exam:    Visit Vitals  /80 (BP 1 Location: Right arm, BP Patient Position: Sitting, BP Cuff Size: Adult)   Pulse 76   Resp 20   SpO2 97%      General: Well-appearing male in no acute distress . Pt wearing a facemask  HEENT: EOMI, no scleral icterus is noted. Neck supple. CV: RRR, +NUBIA  Pulmonary: No increased work or breathing is noted. CTA bilaterally. Abdomen: nondistended. Extremities: s/p left AKA. Prosthetic in place. He is ambulating with crutches. No edema RLE. Warm and well perfused on right. No ulcer. Neuro: Cranial nerves II through XII are grossly intact       Impression and Plan:  Davina Hall is a 56 y.o. male with complex vascular history as above. His imaging was reviewed with him in the office today. Bypass grafts are all patent with biphasic flow. He continues to do well and has done remarkably well at maintaining his independence and mobility. Discussed that we will continue to follow him every 6 months with repeat studies. Patient does express that he will follow up with Dr. Janet Godinez at his new office. He is certainly understanding to call our office if needed. Otherwise his paperwork was signed and information will be sent to Dr. Tl Sharif office so that he may continue his routine surveillance and follow-up appointments. Plan was discussed. Patient expresses understanding and agrees. He will continue on ASA/Plavix/statin. [address] [place] [address]      PLEASE NOTE:  This document has been produced using voice recognition software. Unrecognized errors in transcription may be present.

## 2021-03-08 NOTE — PROGRESS NOTES
1. Have you been to an emergency room or urgent care clinic since your last visit? No   Hospitalized since your last visit? If yes, where, when, and reason for visit? No   2. Have you seen or consulted any other health care providers outside of the [facility] since your last visit including any procedures, health maintenance items. If yes, where, when and reason for visit?  Yes ; cardiology , podiatry , pcp         3 most recent \[facility] [place] [facility]\"" 36 Screens 3/8/2021   PHQ Not Done -   Little interest or pleasure in doing things Not at all   Feeling down, depressed, irritable, or hopeless Not at all   Total Score PHQ 2 0

## 2021-03-16 ENCOUNTER — OFFICE VISIT (OUTPATIENT)
Dept: FAMILY MEDICINE CLINIC | Age: 61
End: 2021-03-16
Payer: COMMERCIAL

## 2021-03-16 VITALS
BODY MASS INDEX: 29.2 KG/M2 | RESPIRATION RATE: 16 BRPM | OXYGEN SATURATION: 98 % | HEIGHT: 70 IN | TEMPERATURE: 97.8 F | WEIGHT: 204 LBS | HEART RATE: 80 BPM | SYSTOLIC BLOOD PRESSURE: 126 MMHG | DIASTOLIC BLOOD PRESSURE: 74 MMHG

## 2021-03-16 DIAGNOSIS — I25.10 CORONARY ARTERY DISEASE INVOLVING NATIVE CORONARY ARTERY OF NATIVE HEART WITHOUT ANGINA PECTORIS: ICD-10-CM

## 2021-03-16 DIAGNOSIS — I11.0 BENIGN HYPERTENSIVE HEART DISEASE WITH SYSTOLIC CONGESTIVE HEART FAILURE, NYHA CLASS 2 (HCC): ICD-10-CM

## 2021-03-16 DIAGNOSIS — I50.20 BENIGN HYPERTENSIVE HEART DISEASE WITH SYSTOLIC CONGESTIVE HEART FAILURE, NYHA CLASS 2 (HCC): ICD-10-CM

## 2021-03-16 DIAGNOSIS — I73.9 PERIPHERAL ARTERY DISEASE (HCC): ICD-10-CM

## 2021-03-16 DIAGNOSIS — E78.5 DYSLIPIDEMIA: ICD-10-CM

## 2021-03-16 DIAGNOSIS — E55.9 VITAMIN D DEFICIENCY: Primary | ICD-10-CM

## 2021-03-16 PROCEDURE — 99214 OFFICE O/P EST MOD 30 MIN: CPT | Performed by: FAMILY MEDICINE

## 2021-03-16 NOTE — PROGRESS NOTES
1. Have you been to the ER, urgent care clinic since your last visit? Hospitalized since your last visit? No    2. Have you seen or consulted any other health care providers outside of the [address] [place] [address] since your last visit? Include any pap smears or colon screening.  No

## 2021-03-16 NOTE — PROGRESS NOTES
Jen Noe, 56 y.o.,  male    SUBJECTIVE  Ff-up    No new concerns     PAD bilateral- s/p AKA Left leg and complicated revasculariation (2017). Doing well. Continues to see vascular surgery, following with dr. Wolfgang Roldan, q6m and patent flow on recent visit. Continues to take gabapentin dose leg pain. He keeps active, working, driving. H/o CAD s/p angioplasty and Afib.h/o of GI bleeding on coumadin  Denies cp, syncope, palpitations, on amiodarone asa/plavix. he is following dr. Laly Bowen now. Stress test 11/2020 low risk study. HTN- on BB, arb,  Lasix. Unable to get labs done, reminded/reprinted    Discussed CRCS due 7/2020- pt prefers to hold off due to cost/ covid pandemic. If he is aware of risk and if he changes his mind we can help facilitate scheduling.  Will need antiplatelet clearance from cards    ROS:  See HPI, all others negative        Patient Active Problem List   Diagnosis Code    Benign hypertensive heart disease with systolic congestive heart failure, NYHA class 2 (HCC) I11.0, I50.20    DDD (degenerative disc disease), lumbar M51.36    Erectile dysfunction N52.9    Spinal stenosis of lumbar region with radiculopathy M48.061, M54.16    Hereditary peripheral neuropathy G60.9    Aortoiliac occlusive disease ([address] I74.09    Atherosclerosis of native artery of both lower extremities with intermittent claudication ([address] I70.213    Peripheral artery disease ([address] I73.9    Coronary artery disease involving native coronary artery of native heart I25.10    Paroxysmal atrial fibrillation (HCC) I48.0    Acute blood loss as cause of postoperative anemia D62    Impaired mobility and ADLs Z74.09, Z78.9    Ischemic cardiomyopathy I25.5    Chronic systolic heart failure (HCC) I50.22    Carotid artery disease (HCC) I77.9    Dyslipidemia E78.5    Euthyroid sick syndrome E07.81    Aftercare following surgery of the circulatory system Z48.812    Status post femoral-popliteal bypass surgery Z95.828    History of cardioversion Z98.890    Critical ischemia of lower extremity I99.8    History of vitamin D deficiency Z86.39    Pseudoaneurysm of femoral artery ([facility]) I72.4    Infection of vascular bypass graft ([facility] T82. 7XXA    Chronic anemia D64.9    Chronic ulcer of right heel ([facility]) L97.419    Ischemic rest pain of lower extremity M79.606, I99.8    Prolonged Q-T interval on ECG R94.31    Status post above-knee amputation of left lower extremity ([facility] [address] [facility] Y85.649    Aftercare for amputation stump Z47.81    Moderate to severe pulmonary hypertension ([facility]) I27.20    Adverse effect of amiodarone T46.2X5A    Skin ulcer of malleolar area of right ankle ([facility]) L97.319       Current Outpatient Medications   Medication Sig Dispense Refill    furosemide (LASIX) 20 mg tablet TAKE 1 TABLET DAILY 90 Tab 1    clopidogreL (PLAVIX) 75 mg tab TAKE 1 TABLET ONCE DAILY 90 Tab 3    metoprolol tartrate (LOPRESSOR) 25 mg tablet TAKE 1 TABLET EVERY 12     HOURS 180 Tab 1    atorvastatin (LIPITOR) 80 mg tablet TAKE 1 TABLET ONCE DAILY 90 Tab 3    losartan (COZAAR) 100 mg tablet TAKE 1 TABLET ONCE DAILY 90 Tab 3    aspirin 81 mg chewable tablet Take 1 Tab by mouth daily. 90 Tab 3    gabapentin (NEURONTIN) 400 mg capsule take 1 capsule by mouth three times a day 270 Cap 0    multivitamin (ONE A DAY) tablet Take 1 Tab by mouth daily. Allergies   Allergen Reactions    Morphine Other (comments)     Patient gets confused with morphine.        Past Medical History:   Diagnosis Date    Acute blood loss as cause of postoperative anemia 4/4/2017    Anticoagulated on Coumadin     Aortoiliac occlusive disease ([address] 1/25/2017    Atherosclerosis of native artery of both lower extremities with intermittent claudication ([facility] [address] 1/25/2017    Benign hypertensive heart disease with systolic congestive heart failure, NYHA class 2 ([facility] [address] 1/26/2015    Carotid artery disease (HCC)     Chronic anemia     Chronic systolic heart failure (HCC)     Chronic ulcer of right foot ([address] 4/4/2017    Chronic ulcer of right heel ([address] 8/8/2017    Coronary artery disease involving native coronary artery of native heart 3/15/2017    Successful stenting of Cx (Xience LESLEY) and RCA (Xience LESLEY) to 0% by Dr. Sandeep Laboy on 3/15/17.  DDD (degenerative disc disease), lumbar 1/26/2015    Dyslipidemia     Erectile dysfunction 7/5/2016    Euthyroid sick syndrome 4/6/2017    Hereditary peripheral neuropathy 11/15/2016    History of cardioversion 4/18/2017    S/P Synchronized external cardioversion (4/18/2017 - Dr. Mccormack [facility])    History of vitamin D deficiency 4/22/2017    Vitamin D 25-Hydroxy (4/22/2017) = 12.0; Vitamin D 25-Hydroxy (5/26/2017) = 38.7    Infection of vascular bypass graft ([facility] 7/24/2017    Left lower extremity    Ischemic cardiomyopathy     Moderate to severe pulmonary hypertension ([address] 7/23/2017    Paroxysmal atrial fibrillation (HCC)     Peripheral artery disease ([address] 1/25/2017    Skin ulcer of malleolar area of right ankle ([facility]     Spinal stenosis of lumbar region with radiculopathy 5/4/2015    Dr. Cleo Martin       Social History     Socioeconomic History    Marital status: LEGALLY      Spouse name: Not on file    Number of children: Not on file    Years of education: Not on file    Highest education level: Not on file   Occupational History    Not on file   Social Needs    Financial resource strain: Not on file    Food insecurity     Worry: Not on file     Inability: Not on file    Transportation needs     Medical: Not on file     Non-medical: Not on file   Tobacco Use    Smoking status: Former Smoker    Smokeless tobacco: Never Used    Tobacco comment: quit in 2011   Substance and Sexual Activity    Alcohol use:  Yes     Alcohol/week: 2.0 standard drinks     Types: 2 Cans of beer per week     Comment: 10 cans of beer a month    Drug use: Yes     Types: Marijuana     Comment: occasionally-last used 4/17    Sexual activity: Yes     Partners: Female   Lifestyle    Physical activity     Days per week: Not on file     Minutes per session: Not on file    Stress: Not on file   Relationships    Social connections     Talks on phone: Not on file     Gets together: Not on file     Attends Muslim service: Not on file     Active member of club or organization: Not on file     Attends meetings of clubs or organizations: Not on file     Relationship status: Not on file    Intimate partner violence     Fear of current or ex partner: Not on file     Emotionally abused: Not on file     Physically abused: Not on file     Forced sexual activity: Not on file   Other Topics Concern    Not on file   Social History Narrative    Not on file       Family History   Problem Relation Age of Onset    Heart Disease Father          OBJECTIVE    Physical Exam:     Visit Vitals  /74 (BP 1 Location: Left upper arm, BP Patient Position: Sitting, BP Cuff Size: Adult)   Pulse 80   Temp 97.8 °F (36.6 °C) (Oral)   Resp 16   Ht 5' 10\" (1.778 m)   Wt 204 lb (92.5 kg)   SpO2 98%   BMI 29.27 kg/m²       General: alert, in no apparent distress or pain  Neck: supple, no adenopathy palpated  CVS: normal rate, regular rhythm, distinct S1 and S2  Lungs:clear to ausculation bilaterally, no crackles, wheezing or rhonchi noted  Extremities: L AKA  W/ prosthesis  Psych:  mood and affect normal  '    ASSESSMENT/PLAN  Diagnoses and all orders for this visit:    PAD (peripheral artery disease) ([facility]  Doing well  S/p L AKA (1071), and complicated revascularization  plavix, asa, statin, gabapentin for pain.    Following Vasc surgery    Benign hypertensive heart disease with systolic congestive heart failure, NYHA class 2 (HCC)  Appears compensated  Cont lasix, on BB and ARB  Cardiology  following     Coronary artery disease involving native coronary artery of native heart without angina pectoris  S/p stent 2017  On ASA, plavix, BB, ARB, statin  Stress test 11/2020 low risk study    Dyslipidemia  LDL goal is <70,   On lipitor 80 mg  Lipid panel 9/2020  Cmp/vitamin D level soon     Status post above knee amputation of left lower extremity ([facility]    Follow-up and Dispositions    · Return in about 6 months (around 9/16/2021), or if symptoms worsen or fail to improve, for routine chronic illness care, labs soon. History of Vitamin D deficiency  Check vitamin D soon    Ff-up in 6 months or sooner prn    Patient understands plan of care. Patient has provided input and agrees with goals.

## 2021-03-16 NOTE — PATIENT INSTRUCTIONS
DASH Diet: Care Instructions  Your Care Instructions     The DASH diet is an eating plan that can help lower your blood pressure. DASH stands for Dietary Approaches to Stop Hypertension. Hypertension is high blood pressure. The DASH diet focuses on eating foods that are high in calcium, potassium, and magnesium. These nutrients can lower blood pressure. The foods that are highest in these nutrients are fruits, vegetables, low-fat dairy products, nuts, seeds, and legumes. But taking calcium, potassium, and magnesium supplements instead of eating foods that are high in those nutrients does not have the same effect. The DASH diet also includes whole grains, fish, and poultry. The DASH diet is one of several lifestyle changes your doctor may recommend to lower your high blood pressure. Your doctor may also want you to decrease the amount of sodium in your diet. Lowering sodium while following the DASH diet can lower blood pressure even further than just the DASH diet alone. Follow-up care is a key part of your treatment and safety. Be sure to make and go to all appointments, and call your doctor if you are having problems. It's also a good idea to know your test results and keep a list of the medicines you take. How can you care for yourself at home? Following the DASH diet  · Eat 4 to 5 servings of fruit each day. A serving is 1 medium-sized piece of fruit, ½ cup chopped or canned fruit, 1/4 cup dried fruit, or 4 ounces (½ cup) of fruit juice. Choose fruit more often than fruit juice. · Eat 4 to 5 servings of vegetables each day. A serving is 1 cup of lettuce or raw leafy vegetables, ½ cup of chopped or cooked vegetables, or 4 ounces (½ cup) of vegetable juice. Choose vegetables more often than vegetable juice. · Get 2 to 3 servings of low-fat and fat-free dairy each day. A serving is 8 ounces of milk, 1 cup of yogurt, or 1 ½ ounces of cheese. · Eat 6 to 8 servings of grains each day.  A serving is 1 slice of bread, 1 ounce of dry cereal, or ½ cup of cooked rice, pasta, or cooked cereal. Try to choose whole-grain products as much as possible. · Limit lean meat, poultry, and fish to 2 servings each day. A serving is 3 ounces, about the size of a deck of cards. · Eat 4 to 5 servings of nuts, seeds, and legumes (cooked dried beans, lentils, and split peas) each week. A serving is 1/3 cup of nuts, 2 tablespoons of seeds, or ½ cup of cooked beans or peas. · Limit fats and oils to 2 to 3 servings each day. A serving is 1 teaspoon of vegetable oil or 2 tablespoons of salad dressing. · Limit sweets and added sugars to 5 servings or less a week. A serving is 1 tablespoon jelly or jam, ½ cup sorbet, or 1 cup of lemonade. · Eat less than 2,300 milligrams (mg) of sodium a day. If you limit your sodium to 1,500 mg a day, you can lower your blood pressure even more. Tips for success  · Start small. Do not try to make dramatic changes to your diet all at once. You might feel that you are missing out on your favorite foods and then be more likely to not follow the plan. Make small changes, and stick with them. Once those changes become habit, add a few more changes. · Try some of the following:  ? Make it a goal to eat a fruit or vegetable at every meal and at snacks. This will make it easy to get the recommended amount of fruits and vegetables each day. ? Try yogurt topped with fruit and nuts for a snack or healthy dessert. ? Add lettuce, tomato, cucumber, and onion to sandwiches. ? Combine a ready-made pizza crust with low-fat mozzarella cheese and lots of vegetable toppings. Try using tomatoes, squash, spinach, broccoli, carrots, cauliflower, and onions. ? Have a variety of cut-up vegetables with a low-fat dip as an appetizer instead of chips and dip. ? Sprinkle sunflower seeds or chopped almonds over salads. Or try adding chopped walnuts or almonds to cooked vegetables.   ? Try some vegetarian meals using beans and peas. Add garbanzo or kidney beans to salads. Make burritos and tacos with mashed cooney beans or black beans. Where can you learn more? Go to http://www.gomes.com/  Enter H967 in the search box to learn more about \"DASH Diet: Care Instructions. \"  Current as of: December 16, 2019               Content Version: 12.6  © 5043-9354 [employer]. Care instructions adapted under license by [employer] (which disclaims liability or warranty for this information). If you have questions about a medical condition or this instruction, always ask your healthcare professional. [address] any warranty or liability for your use of this information.

## 2021-03-30 ENCOUNTER — HOSPITAL ENCOUNTER (OUTPATIENT)
Dept: LAB | Age: 61
Discharge: HOME | DRG: 253 | End: 2021-03-30
Payer: COMMERCIAL

## 2021-03-30 DIAGNOSIS — E78.5 DYSLIPIDEMIA: ICD-10-CM

## 2021-03-30 DIAGNOSIS — Z86.39 HISTORY OF VITAMIN D DEFICIENCY: ICD-10-CM

## 2021-03-30 LAB
25(OH)D3 SERPL-MCNC: 29.5 NG/ML (ref 30–100)
ALBUMIN SERPL-MCNC: 3.6 G/DL (ref 3.4–5)
ALBUMIN/GLOB SERPL: 1 {RATIO} (ref 0.8–1.7)
ALP SERPL-CCNC: 153 U/L (ref 45–117)
ALT SERPL-CCNC: 33 U/L (ref 16–61)
ANION GAP SERPL CALC-SCNC: 10 MMOL/L (ref 3–18)
ANION GAP SERPL CALC-SCNC: 10 MMOL/L (ref 3–18)
AST SERPL-CCNC: 29 U/L (ref 10–38)
BASOPHILS # BLD: 0 K/UL (ref 0–0.1)
BASOPHILS NFR BLD: 0 % (ref 0–2)
BILIRUB SERPL-MCNC: 0.6 MG/DL (ref 0.2–1)
BUN SERPL-MCNC: 8 MG/DL (ref 7–18)
BUN SERPL-MCNC: 8 MG/DL (ref 7–18)
BUN/CREAT SERPL: 11 (ref 12–20)
BUN/CREAT SERPL: 11 (ref 12–20)
CALCIUM SERPL-MCNC: 8.5 MG/DL (ref 8.5–10.1)
CALCIUM SERPL-MCNC: 8.6 MG/DL (ref 8.5–10.1)
CHLORIDE SERPL-SCNC: 109 MMOL/L (ref 100–111)
CHLORIDE SERPL-SCNC: 110 MMOL/L (ref 100–111)
CO2 SERPL-SCNC: 21 MMOL/L (ref 21–32)
CO2 SERPL-SCNC: 21 MMOL/L (ref 21–32)
CREAT SERPL-MCNC: 0.71 MG/DL (ref 0.6–1.3)
CREAT SERPL-MCNC: 0.72 MG/DL (ref 0.6–1.3)
DIFFERENTIAL METHOD BLD: ABNORMAL
EOSINOPHIL # BLD: 0.1 K/UL (ref 0–0.4)
EOSINOPHIL NFR BLD: 1 % (ref 0–5)
ERYTHROCYTE [DISTWIDTH] IN BLOOD BY AUTOMATED COUNT: 14.4 % (ref 11.6–14.5)
GLOBULIN SER CALC-MCNC: 3.5 G/DL (ref 2–4)
GLUCOSE SERPL-MCNC: 89 MG/DL (ref 74–99)
GLUCOSE SERPL-MCNC: 90 MG/DL (ref 74–99)
HCT VFR BLD AUTO: 42.1 % (ref 36–48)
HGB BLD-MCNC: 14.6 G/DL (ref 13–16)
INR PPP: 1.1 (ref 0.8–1.2)
LYMPHOCYTES # BLD: 1.9 K/UL (ref 0.9–3.6)
LYMPHOCYTES NFR BLD: 17 % (ref 21–52)
MCH RBC QN AUTO: 31.2 PG (ref 24–34)
MCHC RBC AUTO-ENTMCNC: 34.7 G/DL (ref 31–37)
MCV RBC AUTO: 90 FL (ref 74–97)
MONOCYTES # BLD: 0.9 K/UL (ref 0.05–1.2)
MONOCYTES NFR BLD: 8 % (ref 3–10)
NEUTS SEG # BLD: 8.5 K/UL (ref 1.8–8)
NEUTS SEG NFR BLD: 74 % (ref 40–73)
PLATELET # BLD AUTO: 398 K/UL (ref 135–420)
PMV BLD AUTO: 9.4 FL (ref 9.2–11.8)
POTASSIUM SERPL-SCNC: 4.4 MMOL/L (ref 3.5–5.5)
POTASSIUM SERPL-SCNC: 4.4 MMOL/L (ref 3.5–5.5)
PROT SERPL-MCNC: 7.1 G/DL (ref 6.4–8.2)
PROTHROMBIN TIME: 14.2 SEC (ref 11.5–15.2)
RBC # BLD AUTO: 4.68 M/UL (ref 4.7–5.5)
SODIUM SERPL-SCNC: 140 MMOL/L (ref 136–145)
SODIUM SERPL-SCNC: 141 MMOL/L (ref 136–145)
WBC # BLD AUTO: 11.4 K/UL (ref 4.6–13.2)

## 2021-03-30 PROCEDURE — 85025 COMPLETE CBC W/AUTO DIFF WBC: CPT

## 2021-03-30 PROCEDURE — 85610 PROTHROMBIN TIME: CPT

## 2021-03-30 PROCEDURE — 82306 VITAMIN D 25 HYDROXY: CPT

## 2021-03-30 PROCEDURE — 80053 COMPREHEN METABOLIC PANEL: CPT

## 2021-03-30 PROCEDURE — 36415 COLL VENOUS BLD VENIPUNCTURE: CPT

## 2021-03-31 ENCOUNTER — HOSPITAL ENCOUNTER (INPATIENT)
Age: 61
LOS: 12 days | Discharge: HOME | DRG: 253 | End: 2021-04-12
Attending: SURGERY | Admitting: SURGERY
Payer: COMMERCIAL

## 2021-03-31 ENCOUNTER — ANESTHESIA (OUTPATIENT)
Dept: CARDIAC CATH/INVASIVE PROCEDURES | Age: 61
DRG: 253 | End: 2021-03-31
Payer: COMMERCIAL

## 2021-03-31 ENCOUNTER — ANESTHESIA EVENT (OUTPATIENT)
Dept: CARDIOTHORACIC SURGERY | Age: 61
DRG: 253 | End: 2021-03-31
Payer: COMMERCIAL

## 2021-03-31 ENCOUNTER — APPOINTMENT (OUTPATIENT)
Dept: CT IMAGING | Age: 61
DRG: 253 | End: 2021-03-31
Attending: SURGERY
Payer: COMMERCIAL

## 2021-03-31 ENCOUNTER — ANESTHESIA EVENT (OUTPATIENT)
Dept: CARDIAC CATH/INVASIVE PROCEDURES | Age: 61
DRG: 253 | End: 2021-03-31
Payer: COMMERCIAL

## 2021-03-31 ENCOUNTER — APPOINTMENT (OUTPATIENT)
Dept: GENERAL RADIOLOGY | Age: 61
DRG: 253 | End: 2021-03-31
Attending: SURGERY
Payer: COMMERCIAL

## 2021-03-31 DIAGNOSIS — I70.211 ATHEROSCLEROSIS OF NATIVE ARTERY OF RIGHT LOWER EXTREMITY WITH INTERMITTENT CLAUDICATION (HCC): ICD-10-CM

## 2021-03-31 DIAGNOSIS — I73.9 PERIPHERAL ARTERY DISEASE (HCC): Chronic | ICD-10-CM

## 2021-03-31 DIAGNOSIS — I73.9 PAD (PERIPHERAL ARTERY DISEASE) (HCC): ICD-10-CM

## 2021-03-31 DIAGNOSIS — R00.0 TACHYCARDIA: ICD-10-CM

## 2021-03-31 DIAGNOSIS — I25.10 CORONARY ARTERY DISEASE INVOLVING NATIVE CORONARY ARTERY OF NATIVE HEART WITHOUT ANGINA PECTORIS: Chronic | ICD-10-CM

## 2021-03-31 DIAGNOSIS — I48.0 PAROXYSMAL ATRIAL FIBRILLATION (HCC): ICD-10-CM

## 2021-03-31 PROBLEM — I70.201 OCCLUSION OF RIGHT FEMORAL ARTERY (HCC): Status: ACTIVE | Noted: 2021-03-31

## 2021-03-31 LAB
APTT PPP: 32.2 SEC (ref 23–36.4)
COVID-19 RAPID TEST, COVR: NOT DETECTED
SOURCE, COVRS: NORMAL

## 2021-03-31 PROCEDURE — 85730 THROMBOPLASTIN TIME PARTIAL: CPT

## 2021-03-31 PROCEDURE — C1894 INTRO/SHEATH, NON-LASER: HCPCS | Performed by: SURGERY

## 2021-03-31 PROCEDURE — 74011250636 HC RX REV CODE- 250/636: Performed by: SURGERY

## 2021-03-31 PROCEDURE — 74011250637 HC RX REV CODE- 250/637: Performed by: SURGERY

## 2021-03-31 PROCEDURE — 74011250636 HC RX REV CODE- 250/636: Performed by: NURSE ANESTHETIST, CERTIFIED REGISTERED

## 2021-03-31 PROCEDURE — 74011000250 HC RX REV CODE- 250: Performed by: SURGERY

## 2021-03-31 PROCEDURE — 87635 SARS-COV-2 COVID-19 AMP PRB: CPT

## 2021-03-31 PROCEDURE — 76060000032 HC ANESTHESIA 0.5 TO 1 HR: Performed by: SURGERY

## 2021-03-31 PROCEDURE — C1769 GUIDE WIRE: HCPCS | Performed by: SURGERY

## 2021-03-31 PROCEDURE — 77030013744: Performed by: SURGERY

## 2021-03-31 PROCEDURE — 65660000004 HC RM CVT STEPDOWN

## 2021-03-31 PROCEDURE — 01924 ANES THER IVNTL RAD ARTL NOS: CPT | Performed by: NURSE ANESTHETIST, CERTIFIED REGISTERED

## 2021-03-31 PROCEDURE — 36415 COLL VENOUS BLD VENIPUNCTURE: CPT

## 2021-03-31 PROCEDURE — 85025 COMPLETE CBC W/AUTO DIFF WBC: CPT

## 2021-03-31 PROCEDURE — 80048 BASIC METABOLIC PNL TOTAL CA: CPT

## 2021-03-31 PROCEDURE — 01924 ANES THER IVNTL RAD ARTL NOS: CPT | Performed by: ANESTHESIOLOGY

## 2021-03-31 PROCEDURE — 71045 X-RAY EXAM CHEST 1 VIEW: CPT

## 2021-03-31 PROCEDURE — 77030004530 HC CATH ANGI DX IMGR BSC -A: Performed by: SURGERY

## 2021-03-31 PROCEDURE — 74011000250 HC RX REV CODE- 250: Performed by: NURSE ANESTHETIST, CERTIFIED REGISTERED

## 2021-03-31 PROCEDURE — 76937 US GUIDE VASCULAR ACCESS: CPT | Performed by: SURGERY

## 2021-03-31 RX ORDER — FUROSEMIDE 20 MG/1
20 TABLET ORAL DAILY
Status: DISCONTINUED | OUTPATIENT
Start: 2021-04-01 | End: 2021-04-12 | Stop reason: HOSPADM

## 2021-03-31 RX ORDER — LIDOCAINE HYDROCHLORIDE 10 MG/ML
INJECTION, SOLUTION EPIDURAL; INFILTRATION; INTRACAUDAL; PERINEURAL AS NEEDED
Status: DISCONTINUED | OUTPATIENT
Start: 2021-03-31 | End: 2021-03-31 | Stop reason: HOSPADM

## 2021-03-31 RX ORDER — HEPARIN SODIUM 10000 [USP'U]/100ML
12-25 INJECTION, SOLUTION INTRAVENOUS
Status: DISCONTINUED | OUTPATIENT
Start: 2021-03-31 | End: 2021-04-01

## 2021-03-31 RX ORDER — THERA TABS 400 MCG
1 TAB ORAL DAILY
Status: DISCONTINUED | OUTPATIENT
Start: 2021-04-01 | End: 2021-04-12 | Stop reason: HOSPADM

## 2021-03-31 RX ORDER — LOSARTAN POTASSIUM 50 MG/1
100 TABLET ORAL DAILY
Status: DISCONTINUED | OUTPATIENT
Start: 2021-04-01 | End: 2021-04-12 | Stop reason: HOSPADM

## 2021-03-31 RX ORDER — DIPHENHYDRAMINE HYDROCHLORIDE 50 MG/ML
12.5 INJECTION, SOLUTION INTRAMUSCULAR; INTRAVENOUS
Status: DISCONTINUED | OUTPATIENT
Start: 2021-03-31 | End: 2021-04-04 | Stop reason: HOSPADM

## 2021-03-31 RX ORDER — SODIUM CHLORIDE 0.9 % (FLUSH) 0.9 %
5-40 SYRINGE (ML) INJECTION EVERY 8 HOURS
Status: DISCONTINUED | OUTPATIENT
Start: 2021-03-31 | End: 2021-03-31

## 2021-03-31 RX ORDER — NALOXONE HYDROCHLORIDE 0.4 MG/ML
0.1 INJECTION, SOLUTION INTRAMUSCULAR; INTRAVENOUS; SUBCUTANEOUS ONCE
Status: ACTIVE | OUTPATIENT
Start: 2021-03-31 | End: 2021-04-01

## 2021-03-31 RX ORDER — ONDANSETRON 2 MG/ML
4 INJECTION INTRAMUSCULAR; INTRAVENOUS ONCE
Status: ACTIVE | OUTPATIENT
Start: 2021-03-31 | End: 2021-04-01

## 2021-03-31 RX ORDER — LIDOCAINE HYDROCHLORIDE 20 MG/ML
INJECTION, SOLUTION EPIDURAL; INFILTRATION; INTRACAUDAL; PERINEURAL AS NEEDED
Status: DISCONTINUED | OUTPATIENT
Start: 2021-03-31 | End: 2021-03-31 | Stop reason: HOSPADM

## 2021-03-31 RX ORDER — HYDROMORPHONE HYDROCHLORIDE 2 MG/ML
INJECTION, SOLUTION INTRAMUSCULAR; INTRAVENOUS; SUBCUTANEOUS
Status: DISPENSED
Start: 2021-03-31 | End: 2021-04-01

## 2021-03-31 RX ORDER — SODIUM CHLORIDE 9 MG/ML
INJECTION, SOLUTION INTRAVENOUS
Status: DISCONTINUED | OUTPATIENT
Start: 2021-03-31 | End: 2021-03-31 | Stop reason: HOSPADM

## 2021-03-31 RX ORDER — SODIUM CHLORIDE, SODIUM LACTATE, POTASSIUM CHLORIDE, CALCIUM CHLORIDE 600; 310; 30; 20 MG/100ML; MG/100ML; MG/100ML; MG/100ML
25 INJECTION, SOLUTION INTRAVENOUS CONTINUOUS
Status: DISPENSED | OUTPATIENT
Start: 2021-03-31 | End: 2021-04-01

## 2021-03-31 RX ORDER — PROPOFOL 10 MG/ML
VIAL (ML) INTRAVENOUS
Status: DISCONTINUED | OUTPATIENT
Start: 2021-03-31 | End: 2021-03-31 | Stop reason: HOSPADM

## 2021-03-31 RX ORDER — METOPROLOL TARTRATE 25 MG/1
25 TABLET, FILM COATED ORAL 2 TIMES DAILY
Status: DISCONTINUED | OUTPATIENT
Start: 2021-03-31 | End: 2021-04-06

## 2021-03-31 RX ORDER — SODIUM CHLORIDE 0.9 % (FLUSH) 0.9 %
5-40 SYRINGE (ML) INJECTION AS NEEDED
Status: DISCONTINUED | OUTPATIENT
Start: 2021-03-31 | End: 2021-04-12 | Stop reason: HOSPADM

## 2021-03-31 RX ORDER — OXYCODONE AND ACETAMINOPHEN 5; 325 MG/1; MG/1
1-2 TABLET ORAL
Status: DISCONTINUED | OUTPATIENT
Start: 2021-03-31 | End: 2021-04-12 | Stop reason: HOSPADM

## 2021-03-31 RX ORDER — SODIUM CHLORIDE 0.9 % (FLUSH) 0.9 %
5-40 SYRINGE (ML) INJECTION EVERY 8 HOURS
Status: DISCONTINUED | OUTPATIENT
Start: 2021-03-31 | End: 2021-04-12 | Stop reason: HOSPADM

## 2021-03-31 RX ORDER — ATORVASTATIN CALCIUM 40 MG/1
80 TABLET, FILM COATED ORAL
Status: DISCONTINUED | OUTPATIENT
Start: 2021-04-01 | End: 2021-04-12 | Stop reason: HOSPADM

## 2021-03-31 RX ORDER — MIDAZOLAM HYDROCHLORIDE 1 MG/ML
INJECTION, SOLUTION INTRAMUSCULAR; INTRAVENOUS AS NEEDED
Status: DISCONTINUED | OUTPATIENT
Start: 2021-03-31 | End: 2021-03-31 | Stop reason: HOSPADM

## 2021-03-31 RX ORDER — GUAIFENESIN 100 MG/5ML
81 LIQUID (ML) ORAL DAILY
Status: DISCONTINUED | OUTPATIENT
Start: 2021-04-01 | End: 2021-04-12 | Stop reason: HOSPADM

## 2021-03-31 RX ORDER — PROPOFOL 10 MG/ML
INJECTION, EMULSION INTRAVENOUS AS NEEDED
Status: DISCONTINUED | OUTPATIENT
Start: 2021-03-31 | End: 2021-03-31 | Stop reason: HOSPADM

## 2021-03-31 RX ORDER — SODIUM CHLORIDE 0.9 % (FLUSH) 0.9 %
5-40 SYRINGE (ML) INJECTION AS NEEDED
Status: DISCONTINUED | OUTPATIENT
Start: 2021-03-31 | End: 2021-03-31

## 2021-03-31 RX ORDER — FAMOTIDINE 20 MG/1
20 TABLET, FILM COATED ORAL ONCE
Status: DISCONTINUED | OUTPATIENT
Start: 2021-03-31 | End: 2021-03-31 | Stop reason: HOSPADM

## 2021-03-31 RX ORDER — HYDROMORPHONE HYDROCHLORIDE 2 MG/ML
0.5 INJECTION, SOLUTION INTRAMUSCULAR; INTRAVENOUS; SUBCUTANEOUS
Status: COMPLETED | OUTPATIENT
Start: 2021-03-31 | End: 2021-04-01

## 2021-03-31 RX ADMIN — PROPOFOL 70 MG: 10 INJECTION, EMULSION INTRAVENOUS at 11:24

## 2021-03-31 RX ADMIN — OXYCODONE HYDROCHLORIDE AND ACETAMINOPHEN 2 TABLET: 5; 325 TABLET ORAL at 16:05

## 2021-03-31 RX ADMIN — HEPARIN SODIUM 12 UNITS/KG/HR: 10000 INJECTION, SOLUTION INTRAVENOUS at 18:17

## 2021-03-31 RX ADMIN — Medication 10 ML: at 20:33

## 2021-03-31 RX ADMIN — SODIUM CHLORIDE: 900 INJECTION, SOLUTION INTRAVENOUS at 11:16

## 2021-03-31 RX ADMIN — GABAPENTIN 400 MG: 300 CAPSULE ORAL at 18:23

## 2021-03-31 RX ADMIN — PROPOFOL 100 MCG/KG/MIN: 10 INJECTION, EMULSION INTRAVENOUS at 11:24

## 2021-03-31 RX ADMIN — MIDAZOLAM HYDROCHLORIDE 2 MG: 2 INJECTION, SOLUTION INTRAMUSCULAR; INTRAVENOUS at 11:21

## 2021-03-31 RX ADMIN — GABAPENTIN 400 MG: 300 CAPSULE ORAL at 21:04

## 2021-03-31 RX ADMIN — LIDOCAINE HYDROCHLORIDE 100 MG: 20 INJECTION, SOLUTION EPIDURAL; INFILTRATION; INTRACAUDAL; PERINEURAL at 11:24

## 2021-03-31 RX ADMIN — Medication 10 ML: at 18:24

## 2021-03-31 RX ADMIN — HYDROMORPHONE HYDROCHLORIDE 0.5 MG: 2 INJECTION, SOLUTION INTRAMUSCULAR; INTRAVENOUS; SUBCUTANEOUS at 20:31

## 2021-03-31 RX ADMIN — SODIUM CHLORIDE, SODIUM LACTATE, POTASSIUM CHLORIDE, AND CALCIUM CHLORIDE 25 ML/HR: 600; 310; 30; 20 INJECTION, SOLUTION INTRAVENOUS at 18:25

## 2021-03-31 RX ADMIN — METOPROLOL TARTRATE 25 MG: 25 TABLET, FILM COATED ORAL at 18:23

## 2021-03-31 RX ADMIN — HYDROMORPHONE HYDROCHLORIDE 0.5 MG: 2 INJECTION, SOLUTION INTRAMUSCULAR; INTRAVENOUS; SUBCUTANEOUS at 12:14

## 2021-03-31 RX ADMIN — Medication 10 ML: at 21:05

## 2021-03-31 NOTE — Clinical Note
TRANSFER - OUT REPORT:     Verbal report given to: Maricel Moore RN. Report consisted of patient's Situation, Background, Assessment and   Recommendations(SBAR). Opportunity for questions and clarification was provided. Patient transported with a Cardiac Cath Tech / Patient Care Tech. Patient transported to: [address]

## 2021-03-31 NOTE — PROGRESS NOTES
Cath holding summary     Patient escorted to cath holding from waiting area ambulatory, alert and oriented x 4, voicing no complaints. Changed into gown and placed on monitor. NPO since MN. Lab results, med rec and H&P reviewed on chart. PIV x 1 inserted without difficulty. 1105  TRANSFER - OUT REPORT:    Verbal report given to Estelle SARMIENTO(name) on Anu Olvera  being transferred to cath lab(unit) for ordered procedure       Report consisted of patients Situation, Background, Assessment and   Recommendations(SBAR). Information from the following report(s) SBAR, Kardex, MAR, Recent Results, Pre Procedure Checklist, Procedure Verification and Quality Measures was reviewed with the receiving nurse. Lines:   Peripheral IV 03/31/21 Left Hand (Active)   Site Assessment Clean, dry, & intact 03/31/21 0954   Phlebitis Assessment 0 03/31/21 0954   Infiltration Assessment 0 03/31/21 0954   Dressing Status New 03/31/21 0954   Dressing Type Tape;Transparent 03/31/21 0954   Hub Color/Line Status Pink;Flushed 03/31/21 4241        Opportunity for questions and clarification was provided. Patient transported with:   [address]  REPORT:    Verbal report received from [facility] RN(name) on Anu Olvera  being received from cath lab(unit) for routine post - op      Report consisted of patients Situation, Background, Assessment and   Recommendations(SBAR). Information from the following report(s) SBAR, Procedure Summary, Procedure Verification and Quality Measures was reviewed with the receiving nurse. Opportunity for questions and clarification was provided. Assessment completed upon patients arrival to unit and care assumed.      1424  TRANSFER - OUT REPORT:    Verbal report given to Shannon Portillo RN(name) on Anu Olvera  being transferred to cvt stepdown (unit) for routine progression of care       Report consisted of patients Situation, Background, Assessment and Recommendations(SBAR). Information from the following report(s) SBAR, Kardex, Procedure Summary, Recent Results, Procedure Verification and Quality Measures was reviewed with the receiving nurse. Lines:   Peripheral IV 03/31/21 Left Hand (Active)   Site Assessment Clean, dry, & intact 03/31/21 0954   Phlebitis Assessment 0 03/31/21 0954   Infiltration Assessment 0 03/31/21 0954   Dressing Status New 03/31/21 0954   Dressing Type Tape;Transparent 03/31/21 0954   Hub Color/Line Status Pink;Flushed 03/31/21 5354        Opportunity for questions and clarification was provided.       Patient transported with:   Registered Nurse

## 2021-03-31 NOTE — H&P
Surgery History and Physical    Subjective: Shon Peacock is a 59 y.o.  male who presents with right leg arterial occlusion. Occluded right leg bypass graft.     Patient Active Problem List    Diagnosis Date Noted    Skin ulcer of malleolar area of right ankle ([address]     Prolonged Q-T interval on ECG 08/19/2017    Adverse effect of amiodarone 08/19/2017    Status post above-knee amputation of left lower extremity ([address] 08/18/2017    Aftercare for amputation stump 08/18/2017    Ischemic rest pain of lower extremity 08/14/2017    Chronic ulcer of right heel ([address] 08/08/2017    Chronic anemia     Acute blood loss as cause of postoperative anemia 07/25/2017    Aftercare following surgery of the circulatory system 07/25/2017    Impaired mobility and ADLs 07/24/2017    Infection of vascular bypass graft ([address] 07/24/2017    Moderate to severe pulmonary hypertension ([address] 07/23/2017    Pseudoaneurysm of femoral artery ([address] 06/27/2017    History of vitamin D deficiency 04/22/2017    Status post femoral-popliteal bypass surgery 04/21/2017    Ischemic cardiomyopathy     Chronic systolic heart failure (HCC)     Carotid artery disease ([address]     Dyslipidemia     History of cardioversion 04/18/2017    Paroxysmal atrial fibrillation ([address]     Critical ischemia of lower extremity 04/10/2017    Euthyroid sick syndrome 04/06/2017    Coronary artery disease involving native coronary artery of native heart 03/15/2017    Aortoiliac occlusive disease ([address] 01/25/2017    Atherosclerosis of native artery of both lower extremities with intermittent claudication ([address] 01/25/2017    Peripheral artery disease ([address] 01/25/2017    Hereditary peripheral neuropathy 11/15/2016    Erectile dysfunction 07/05/2016    Spinal stenosis of lumbar region with radiculopathy 05/04/2015    Benign hypertensive heart disease with systolic congestive heart failure, NYHA class 2 ([address] 01/26/2015    DDD (degenerative disc disease), lumbar 01/26/2015     Past Medical History:   Diagnosis Date    Acute blood loss as cause of postoperative anemia 4/4/2017    Anticoagulated on Coumadin     Aortoiliac occlusive disease ([address] 1/25/2017    Atherosclerosis of native artery of both lower extremities with intermittent claudication ([address] 1/25/2017    Benign hypertensive heart disease with systolic congestive heart failure, NYHA class 2 ([address] 1/26/2015    Carotid artery disease (HCC)     Chronic anemia     Chronic systolic heart failure (HCC)     Chronic ulcer of right foot ([address] 4/4/2017    Chronic ulcer of right heel ([address] 8/8/2017    Coronary artery disease involving native coronary artery of native heart 3/15/2017    Successful stenting of Cx (Xience LESLEY) and RCA (Xience LESLEY) to 0% by Dr. Marleen Harris on 3/15/17.     DDD (degenerative disc disease), lumbar 1/26/2015    Dyslipidemia     Erectile dysfunction 7/5/2016    Euthyroid sick syndrome 4/6/2017    Hereditary peripheral neuropathy 11/15/2016    History of cardioversion 4/18/2017    S/P Synchronized external cardioversion (4/18/2017 - Dr. Luda Mercado)    History of vitamin D deficiency 4/22/2017    Vitamin D 25-Hydroxy (4/22/2017) = 12.0; Vitamin D 25-Hydroxy (5/26/2017) = 38.7    Infection of vascular bypass graft ([address] 7/24/2017    Left lower extremity    Ischemic cardiomyopathy     Moderate to severe pulmonary hypertension ([address] 7/23/2017    Paroxysmal atrial fibrillation (HCC)     Peripheral artery disease ([address] 1/25/2017    Skin ulcer of malleolar area of right ankle ([address]     Spinal stenosis of lumbar region with radiculopathy 5/4/2015    Dr. Dipak Ruano      Past Surgical History:   Procedure Laterality Date    CARDIAC CATHETERIZATION  3/8/2017         CARDIAC CATHETERIZATION  3/15/2017         CORONARY STENT SINGLE W/PTCA  3/15/2017         HX ABOVE KNEE AMPUTATION Left 08/18/2017    S/P Left above-the-knee amputation (8/18/2017 - Dr. Neal Esquivel)    HX ATHERECTOMY Left 06/15/2017    S/P Atherectomy and balloon angioplasty of the left superficial femoral artery and above-knee popliteal artery (6/15/2017 - Dr. Neal Esquivel)    HX ATHERECTOMY Right 09/07/2017    S/P Atherectomy and balloon angioplasty of the below-the-knee popliteal artery, above-the-knee popliteal artery, tibioperoneal trunk artery and posterior tibial artery (9/7/2017 - Dr. Neal Esquivel)    HX COLONOSCOPY  07/23/2015    polyps repeat 2020 5 years [address] Right 04/04/2017    S/P Right axillary to bifemoral artery bypass using an 8 mm Propaten graft from the right axilla to the right common femoral artery with a jump femoral-femoral bypass with another 8 mm Propaten external ring bypass; Right common femoral artery, and profunda femoris artery and superficial femoral artery endarterectomy causing an extensive surgery on the right side (4/4/2017 - Dr. Kristi Hutchison)    [address] Right 04/07/2017    S/P Cutdown of femoral-femoral bypass, more on the left groin side; Right lower extremity angiography with first-order catheterization (4/7/2017 - Dr. Neal Esquivel)    HX FEMORAL BYPASS Right 04/10/2017    S/P Right femoral to above-knee popliteal bypass with an 8 mm PTFE graft (4/10/2017 - Dr. Shayne Choudhury)    HX OTHER SURGICAL Left 07/25/2017    S/P Removal of infected graft; Repair of left superficial femoral artery; Repair of left common femoral artery (7/25/2017 - Dr. Neal Esquivel)    HX OTHER SURGICAL Right 06/27/2017    S/P Drainage of right side abscess (6/27/2017 - Dr. Neal Esquivel)    HX OTHER SURGICAL Left 07/01/2017    S/P Left groin exploration; Left femoral repair pseudoaneurysm; Left wound washout;  Wound VAC placement (7/01/2017 - Dr. Neal Esquivel)    HX OTHER SURGICAL Left 07/04/2017    S/P Left common femoral artery ligation; Jump bypass from right to left fem-fem to a more distal superficial femoral artery using 8 mm external ringed Propaten graft; Left groin wound exploration and washout (7/4/2017 - Dr. Jaswant Gilman)    HX OTHER SURGICAL Right 08/18/2017    S/P Right axillary femoral jump bypass interposition; Removal of infected right axillary femoral bypass; Wound VAC placement of upper thigh 1.2 cm x 5.2 cm x 1.8 cm and groin 4 cm x 0.5 cm x 0.2 cm (8/18/2017 - Dr. Jaswant Gilman)      Social History     Tobacco Use    Smoking status: Former Smoker    Smokeless tobacco: Never Used    Tobacco comment: quit in 2011   Substance Use Topics    Alcohol use: Yes     Alcohol/week: 2.0 standard drinks     Types: 2 Cans of beer per week     Comment: 10 cans of beer a month      Family History   Problem Relation Age of Onset    Heart Disease Father       Prior to Admission medications    Medication Sig Start Date End Date Taking? Authorizing Provider   furosemide (LASIX) 20 mg tablet TAKE 1 TABLET DAILY 12/7/20   Kapil Armijo MD   clopidogreL (PLAVIX) 75 mg tab TAKE 1 TABLET ONCE DAILY 12/6/20   Fernanda Rosalie K, DO   metoprolol tartrate (LOPRESSOR) 25 mg tablet TAKE 1 TABLET EVERY 12     HOURS 10/20/20   Kapil Armijo MD   atorvastatin (LIPITOR) 80 mg tablet TAKE 1 TABLET ONCE DAILY 10/19/20   Kapil Armijo MD   losartan (COZAAR) 100 mg tablet TAKE 1 TABLET ONCE DAILY 10/19/20   Kapil Armijo MD   aspirin 81 mg chewable tablet Take 1 Tab by mouth daily. 4/10/20   Kapil Armijo MD   gabapentin (NEURONTIN) 400 mg capsule take 1 capsule by mouth three times a day 3/29/18   Kapil Armijo MD   multivitamin (ONE A DAY) tablet Take 1 Tab by mouth daily. Provider, Historical     Allergies   Allergen Reactions    Morphine Other (comments)     Patient gets confused with morphine. Review of Systems:    A comprehensive review of systems was negative except for that written in the History of Present Illness.     Objective:     No data found.    No data recorded. Physical Exam:  LUNG: clear to auscultation bilaterally, HEART: S1, S2 normal, ABDOMEN: soft, non-tender. Bowel sounds normal. No masses,  no organomegaly. Left aka. Right leg no pulses. Labs:   Recent Results (from the past 24 hour(s))   PROTHROMBIN TIME + INR    Collection Time: 03/30/21  8:12 AM   Result Value Ref Range    Prothrombin time 14.2 11.5 - 15.2 sec    INR 1.1 0.8 - 1.2     CBC WITH AUTOMATED DIFF    Collection Time: 03/30/21  8:12 AM   Result Value Ref Range    WBC 11.4 4.6 - 13.2 K/uL    RBC 4.68 (L) 4.70 - 5.50 M/uL    HGB 14.6 13.0 - 16.0 g/dL    HCT 42.1 36.0 - 48.0 %    MCV 90.0 74.0 - 97.0 FL    MCH 31.2 24.0 - 34.0 PG    MCHC 34.7 31.0 - 37.0 g/dL    RDW 14.4 11.6 - 14.5 %    PLATELET 602 934 - 326 K/uL    MPV 9.4 9.2 - 11.8 FL    NEUTROPHILS 74 (H) 40 - 73 %    LYMPHOCYTES 17 (L) 21 - 52 %    MONOCYTES 8 3 - 10 %    EOSINOPHILS 1 0 - 5 %    BASOPHILS 0 0 - 2 %    ABS. NEUTROPHILS 8.5 (H) 1.8 - 8.0 K/UL    ABS. LYMPHOCYTES 1.9 0.9 - 3.6 K/UL    ABS. MONOCYTES 0.9 0.05 - 1.2 K/UL    ABS. EOSINOPHILS 0.1 0.0 - 0.4 K/UL    ABS.  BASOPHILS 0.0 0.0 - 0.1 K/UL    DF AUTOMATED     METABOLIC PANEL, BASIC    Collection Time: 03/30/21  8:12 AM   Result Value Ref Range    Sodium 140 136 - 145 mmol/L    Potassium 4.4 3.5 - 5.5 mmol/L    Chloride 109 100 - 111 mmol/L    CO2 21 21 - 32 mmol/L    Anion gap 10 3.0 - 18 mmol/L    Glucose 90 74 - 99 mg/dL    BUN 8 7.0 - 18 MG/DL    Creatinine 0.72 0.6 - 1.3 MG/DL    BUN/Creatinine ratio 11 (L) 12 - 20      GFR est AA >60 >60 ml/min/1.73m2    GFR est non-AA >60 >60 ml/min/1.73m2    Calcium 8.5 8.5 - 10.1 MG/DL       Data Review:  See above      Assessment:     Active Problems:    Peripheral artery disease (HCC) (1/25/2017)        Plan:     Angio right leg    Signed By: Sandy Price MD     March 31, 2021

## 2021-03-31 NOTE — PROGRESS NOTES
1900 - pt off unit for CTA    1907 - Bedside shift change report given to BEAU Pham (oncoming nurse) by Patricia Beltran RN (offgoing nurse). Report included the following information SBAR, Kardex, Procedure Summary, Intake/Output, MAR, Recent Results and Cardiac Rhythm NSR.

## 2021-03-31 NOTE — ANESTHESIA POSTPROCEDURE EVALUATION
Procedure(s):  ANGIOGRAPHY LOWER EXT RIGHT/with intervention. MAC    Anesthesia Post Evaluation      Multimodal analgesia: multimodal analgesia used between 6 hours prior to anesthesia start to PACU discharge  Patient location during evaluation: bedside  Patient participation: complete - patient participated  Level of consciousness: awake  Pain management: adequate  Airway patency: patent  Anesthetic complications: no  Cardiovascular status: stable  Respiratory status: acceptable  Hydration status: acceptable  Post anesthesia nausea and vomiting:  controlled  Final Post Anesthesia Temperature Assessment:  Normothermia (36.0-37.5 degrees C)      INITIAL Post-op Vital signs:   Vitals Value Taken Time   /83 03/31/21 1231   Temp 36.9 °C (98.4 °F) 03/31/21 1201   Pulse 74 03/31/21 1233   Resp 22 03/31/21 1201   SpO2 98 % 03/31/21 1233   Vitals shown include unvalidated device data.

## 2021-03-31 NOTE — Clinical Note
TRANSFER - IN REPORT:     Verbal report received from: Niko Bonilla RN. Report consisted of patient's Situation, Background, Assessment and   Recommendations(SBAR). Opportunity for questions and clarification was provided. Assessment completed upon patient's arrival to unit and care assumed. Patient transported with a Cardiac Cath Tech / Patient Care Tech.

## 2021-03-31 NOTE — OP NOTES
Preoperative diagnosis: Occlusion of right femoropopliteal bypass occlusion of right axillofemoral bypass with PAD    Postoperative diagnosis: Same    Procedures performed:  #1  Ultrasound of left femoral arteries interpretation  #2  Attempted access bilateral femoral artery          Cultures: None    Specimens: None    Drains: None    Estimated blood loss: Less than 50 mL    Assistants: None    Implants: Please see above    Complications: None    Anesthesia: Moderate conscious sedation provided by anesthesia    Indications for the procedure: Law Davila is a 59 y.o. came to the office yesterday with right foot pain but did have a patent axillofemoral and occluded right femoropopliteal..  Patient was given the appropriate risk and benefits of the procedure including but not limited to bleeding, infection, damage to adjacent structures, MI, stroke, death, loss of lower extremity, need for further surgery. Patient was understanding of all the risks and underwent a procedure. Operative findings:   #1  Trickle flow seen at collateral vessels around the left common femoral picture was stored. No meaningful access to either femoral artery percutaneously    Procedure:  Patient was correctly identified in the precath area and taken to the Cath Lab in stable condition. Patient had pre-incision timeout prior to any incision. Patient was prepped and draped in the normal sterile fashion according to CDC guidelines aseptic technique. Did ultrasound first in the left groin I can see this bypass grafting and found the common femoral there is flow noted that the collaterals circumflex but no flow in the common femoral itself. I did access and got a some blood flow but then I do pulled the sheath there is no wire access to the pelvis. Ultrasound the right groin and the axillofemoral bypass is down as well there is no color flow observed and no present pulse.   Terminated the procedure we will admit him to the hospital get a CTA and plan for surgical intervention tomorrow.   We will start heparin protocol

## 2021-03-31 NOTE — ANESTHESIA PREPROCEDURE EVALUATION
Relevant Problems   No relevant active problems       Anesthetic History   No history of anesthetic complications            Review of Systems / Medical History  Patient summary reviewed, nursing notes reviewed and pertinent labs reviewed    Pulmonary  Within defined limits                 Neuro/Psych             Comments: 02/2021  The right ICA is occluded  The right vertebral is antegrade  There is mild stenosis in the left ICA (<50%) and at the proximal segment  The left vertebral is occluded.  Cardiovascular    Hypertension        Dysrhythmias (paroxysmal A fib) : atrial fibrillation  CAD, PAD, cardiac stents (2017) and hyperlipidemia      Comments: dilated cardiomyopathy    11/2020 stress  Left ventricular perfusion is probably normal. Myocardial perfusion imaging supports a low risk stress test  Calculated ejection fraction is 51%   GI/Hepatic/Renal  Within defined limits              Endo/Other        Arthritis     Other Findings   Comments: Marijuana    HX ABOVE KNEE AMPUTATION          Physical Exam    Airway  Mallampati: II  TM Distance: 4 - 6 cm  Neck ROM: normal range of motion   Mouth opening: Normal    Comments: Facial hair Cardiovascular    Rhythm: regular           Dental    Dentition: Full lower dentures and Full upper dentures     Pulmonary  Breath sounds clear to auscultation               Abdominal  GI exam deferred       Other Findings            Anesthetic Plan    ASA: 3  Anesthesia type: MAC            Anesthetic plan and risks discussed with: Patient

## 2021-03-31 NOTE — Clinical Note
Contrast Dose Calculator:   Patient's age: 59.   Patient's sex: Male. Patient weight (kg) = 92.1. Creatinine level (mg/dL) = 0.72. Creatinine clearance (mL/min): 136.8. Contrast concentration (mg/mL) = 300. MACD = 300 mL. Max Contrast dose per Creatinine Cl calculator = 307.8 mL.

## 2021-04-01 ENCOUNTER — APPOINTMENT (OUTPATIENT)
Dept: GENERAL RADIOLOGY | Age: 61
DRG: 253 | End: 2021-04-01
Attending: SURGERY
Payer: COMMERCIAL

## 2021-04-01 ENCOUNTER — ANESTHESIA (OUTPATIENT)
Dept: CARDIOTHORACIC SURGERY | Age: 61
DRG: 253 | End: 2021-04-01
Payer: COMMERCIAL

## 2021-04-01 ENCOUNTER — APPOINTMENT (OUTPATIENT)
Dept: CT IMAGING | Age: 61
DRG: 253 | End: 2021-04-01
Attending: SURGERY
Payer: COMMERCIAL

## 2021-04-01 ENCOUNTER — APPOINTMENT (OUTPATIENT)
Dept: NON INVASIVE DIAGNOSTICS | Age: 61
DRG: 253 | End: 2021-04-01
Attending: SURGERY
Payer: COMMERCIAL

## 2021-04-01 LAB
ABO + RH BLD: NORMAL
ANION GAP SERPL CALC-SCNC: 4 MMOL/L (ref 3–18)
APTT PPP: 138.5 SEC (ref 23–36.4)
APTT PPP: 47.8 SEC (ref 23–36.4)
APTT PPP: 51.5 SEC (ref 23–36.4)
APTT PPP: 52.4 SEC (ref 23–36.4)
ATRIAL RATE: 64 BPM
BASOPHILS # BLD: 0 K/UL (ref 0–0.06)
BASOPHILS NFR BLD: 0 % (ref 0–3)
BLOOD GROUP ANTIBODIES SERPL: NORMAL
BUN SERPL-MCNC: 8 MG/DL (ref 7–18)
BUN/CREAT SERPL: 13 (ref 12–20)
CALCIUM SERPL-MCNC: 8.1 MG/DL (ref 8.5–10.1)
CALCULATED P AXIS, ECG09: 50 DEGREES
CALCULATED R AXIS, ECG10: -13 DEGREES
CALCULATED T AXIS, ECG11: 8 DEGREES
CHLORIDE SERPL-SCNC: 113 MMOL/L (ref 100–111)
CO2 SERPL-SCNC: 24 MMOL/L (ref 21–32)
CREAT SERPL-MCNC: 0.63 MG/DL (ref 0.6–1.3)
DIAGNOSIS, 93000: NORMAL
DIFFERENTIAL METHOD BLD: ABNORMAL
EOSINOPHIL # BLD: 0 K/UL (ref 0–0.4)
EOSINOPHIL NFR BLD: 0 % (ref 0–5)
ERYTHROCYTE [DISTWIDTH] IN BLOOD BY AUTOMATED COUNT: 14.5 % (ref 11.6–14.5)
GLUCOSE SERPL-MCNC: 98 MG/DL (ref 74–99)
HCT VFR BLD AUTO: 40.1 % (ref 36–48)
HGB BLD-MCNC: 14 G/DL (ref 13–16)
LYMPHOCYTES # BLD: 4.3 K/UL (ref 0.8–3.5)
LYMPHOCYTES NFR BLD: 35 % (ref 20–51)
MAGNESIUM SERPL-MCNC: 1.6 MG/DL (ref 1.6–2.6)
MCH RBC QN AUTO: 31.4 PG (ref 24–34)
MCHC RBC AUTO-ENTMCNC: 34.9 G/DL (ref 31–37)
MCV RBC AUTO: 89.9 FL (ref 74–97)
MONOCYTES # BLD: 0.9 K/UL (ref 0–1)
MONOCYTES NFR BLD: 7 % (ref 2–9)
NEUTS SEG # BLD: 6.8 K/UL (ref 1.8–8)
NEUTS SEG NFR BLD: 55 % (ref 42–75)
OTHER CELLS NFR BLD MANUAL: 3 %
P-R INTERVAL, ECG05: 168 MS
PLATELET # BLD AUTO: 336 K/UL (ref 135–420)
PLATELET COMMENTS,PCOM: ABNORMAL
PMV BLD AUTO: 9.2 FL (ref 9.2–11.8)
POTASSIUM SERPL-SCNC: 3.5 MMOL/L (ref 3.5–5.5)
POTASSIUM SERPL-SCNC: 3.6 MMOL/L (ref 3.5–5.5)
Q-T INTERVAL, ECG07: 412 MS
QRS DURATION, ECG06: 82 MS
QTC CALCULATION (BEZET), ECG08: 425 MS
RBC # BLD AUTO: 4.46 M/UL (ref 4.7–5.5)
RBC MORPH BLD: ABNORMAL
SODIUM SERPL-SCNC: 141 MMOL/L (ref 136–145)
SPECIMEN EXP DATE BLD: NORMAL
VENTRICULAR RATE, ECG03: 64 BPM
WBC # BLD AUTO: 12.4 K/UL (ref 4.6–13.2)

## 2021-04-01 PROCEDURE — 65620000000 HC RM CCU GENERAL

## 2021-04-01 PROCEDURE — 74011000636 HC RX REV CODE- 636: Performed by: SURGERY

## 2021-04-01 PROCEDURE — 74011000250 HC RX REV CODE- 250: Performed by: NURSE ANESTHETIST, CERTIFIED REGISTERED

## 2021-04-01 PROCEDURE — 77030002987 HC SUT PROL J&J -B: Performed by: SURGERY

## 2021-04-01 PROCEDURE — 76010000111 HC CV SURG 3.5 TO 4 HR: Performed by: SURGERY

## 2021-04-01 PROCEDURE — C1894 INTRO/SHEATH, NON-LASER: HCPCS | Performed by: SURGERY

## 2021-04-01 PROCEDURE — 77030005206: Performed by: SURGERY

## 2021-04-01 PROCEDURE — 85730 THROMBOPLASTIN TIME PARTIAL: CPT

## 2021-04-01 PROCEDURE — 74011000258 HC RX REV CODE- 258: Performed by: NURSE ANESTHETIST, CERTIFIED REGISTERED

## 2021-04-01 PROCEDURE — 04CK0ZZ EXTIRPATION OF MATTER FROM RIGHT FEMORAL ARTERY, OPEN APPROACH: ICD-10-PCS | Performed by: SURGERY

## 2021-04-01 PROCEDURE — C1725 CATH, TRANSLUMIN NON-LASER: HCPCS | Performed by: SURGERY

## 2021-04-01 PROCEDURE — C1768 GRAFT, VASCULAR: HCPCS | Performed by: SURGERY

## 2021-04-01 PROCEDURE — 01924 ANES THER IVNTL RAD ARTL NOS: CPT | Performed by: ANESTHESIOLOGY

## 2021-04-01 PROCEDURE — 83735 ASSAY OF MAGNESIUM: CPT

## 2021-04-01 PROCEDURE — 77030010512 HC APPL CLP LIG J&J -C: Performed by: SURGERY

## 2021-04-01 PROCEDURE — 93005 ELECTROCARDIOGRAM TRACING: CPT

## 2021-04-01 PROCEDURE — 77030002996 HC SUT SLK J&J -A: Performed by: SURGERY

## 2021-04-01 PROCEDURE — 74011250637 HC RX REV CODE- 250/637: Performed by: SURGERY

## 2021-04-01 PROCEDURE — 77030002933 HC SUT MCRYL J&J -A: Performed by: SURGERY

## 2021-04-01 PROCEDURE — 77030025869: Performed by: SURGERY

## 2021-04-01 PROCEDURE — 76060000038 HC ANESTHESIA 3.5 TO 4 HR: Performed by: SURGERY

## 2021-04-01 PROCEDURE — 77030002986 HC SUT PROL J&J -A: Performed by: SURGERY

## 2021-04-01 PROCEDURE — 77030013079 HC BLNKT BAIR HGGR 3M -A: Performed by: ANESTHESIOLOGY

## 2021-04-01 PROCEDURE — 2709999900 HC NON-CHARGEABLE SUPPLY: Performed by: SURGERY

## 2021-04-01 PROCEDURE — 77030010507 HC ADH SKN DERMBND J&J -B: Performed by: SURGERY

## 2021-04-01 PROCEDURE — 74011250636 HC RX REV CODE- 250/636: Performed by: NURSE ANESTHETIST, CERTIFIED REGISTERED

## 2021-04-01 PROCEDURE — 77030018390 HC SPNG HEMSTAT2 J&J -B: Performed by: SURGERY

## 2021-04-01 PROCEDURE — 74011000250 HC RX REV CODE- 250: Performed by: SURGERY

## 2021-04-01 PROCEDURE — 77030008683 HC TU ET CUF COVD -A: Performed by: ANESTHESIOLOGY

## 2021-04-01 PROCEDURE — 77030014006 HC SPNG HEMSTAT J&J -A: Performed by: SURGERY

## 2021-04-01 PROCEDURE — 77030026438 HC STYL ET INTUB CARD -A: Performed by: ANESTHESIOLOGY

## 2021-04-01 PROCEDURE — 77030040830 HC CATH URETH FOL MDII -A: Performed by: SURGERY

## 2021-04-01 PROCEDURE — 77030003390 HC NDL ANGI MRTM -A: Performed by: SURGERY

## 2021-04-01 PROCEDURE — 047M0ZZ DILATION OF RIGHT POPLITEAL ARTERY, OPEN APPROACH: ICD-10-PCS | Performed by: SURGERY

## 2021-04-01 PROCEDURE — 04UK0KZ SUPPLEMENT RIGHT FEMORAL ARTERY WITH NONAUTOLOGOUS TISSUE SUBSTITUTE, OPEN APPROACH: ICD-10-PCS | Performed by: SURGERY

## 2021-04-01 PROCEDURE — 99223 1ST HOSP IP/OBS HIGH 75: CPT | Performed by: INTERNAL MEDICINE

## 2021-04-01 PROCEDURE — 01924 ANES THER IVNTL RAD ARTL NOS: CPT | Performed by: NURSE ANESTHETIST, CERTIFIED REGISTERED

## 2021-04-01 PROCEDURE — 36415 COLL VENOUS BLD VENIPUNCTURE: CPT

## 2021-04-01 PROCEDURE — 77030038692 HC WND DEB SYS IRMX -B: Performed by: SURGERY

## 2021-04-01 PROCEDURE — 77030011265 HC ELECTRD BLD HEX COVD -A: Performed by: SURGERY

## 2021-04-01 PROCEDURE — 75635 CT ANGIO ABDOMINAL ARTERIES: CPT

## 2021-04-01 PROCEDURE — 86901 BLOOD TYPING SEROLOGIC RH(D): CPT

## 2021-04-01 PROCEDURE — 74011250636 HC RX REV CODE- 250/636

## 2021-04-01 PROCEDURE — 77030002924 HC SUT GORTX WLGO -B: Performed by: SURGERY

## 2021-04-01 PROCEDURE — 77030020061 HC IV BLD WRMR ADMIN SET 3M -B: Performed by: ANESTHESIOLOGY

## 2021-04-01 PROCEDURE — 77030026918 HC ADMN ST IV BLD BD -A: Performed by: ANESTHESIOLOGY

## 2021-04-01 PROCEDURE — 74011250636 HC RX REV CODE- 250/636: Performed by: SURGERY

## 2021-04-01 PROCEDURE — 74011250637 HC RX REV CODE- 250/637: Performed by: NURSE ANESTHETIST, CERTIFIED REGISTERED

## 2021-04-01 PROCEDURE — 2709999900 HC NON-CHARGEABLE SUPPLY

## 2021-04-01 PROCEDURE — C1757 CATH, THROMBECTOMY/EMBOLECT: HCPCS | Performed by: SURGERY

## 2021-04-01 DEVICE — XENOSURE BIOLOGIC PATCH, 0.8CM X 8CM, EIFU
Type: IMPLANTABLE DEVICE | Site: LEG | Status: FUNCTIONAL
Brand: XENOSURE BIOLOGIC PATCH

## 2021-04-01 RX ORDER — NEOSTIGMINE METHYLSULFATE 1 MG/ML
INJECTION, SOLUTION INTRAVENOUS AS NEEDED
Status: DISCONTINUED | OUTPATIENT
Start: 2021-04-01 | End: 2021-04-01 | Stop reason: HOSPADM

## 2021-04-01 RX ORDER — ROCURONIUM BROMIDE 10 MG/ML
INJECTION, SOLUTION INTRAVENOUS AS NEEDED
Status: DISCONTINUED | OUTPATIENT
Start: 2021-04-01 | End: 2021-04-01 | Stop reason: HOSPADM

## 2021-04-01 RX ORDER — GLYCOPYRROLATE 0.2 MG/ML
INJECTION INTRAMUSCULAR; INTRAVENOUS AS NEEDED
Status: DISCONTINUED | OUTPATIENT
Start: 2021-04-01 | End: 2021-04-01 | Stop reason: HOSPADM

## 2021-04-01 RX ORDER — HYDRALAZINE HYDROCHLORIDE 20 MG/ML
INJECTION INTRAMUSCULAR; INTRAVENOUS AS NEEDED
Status: DISCONTINUED | OUTPATIENT
Start: 2021-04-01 | End: 2021-04-01 | Stop reason: HOSPADM

## 2021-04-01 RX ORDER — FENTANYL CITRATE 50 UG/ML
INJECTION, SOLUTION INTRAMUSCULAR; INTRAVENOUS AS NEEDED
Status: DISCONTINUED | OUTPATIENT
Start: 2021-04-01 | End: 2021-04-01 | Stop reason: HOSPADM

## 2021-04-01 RX ORDER — SUCCINYLCHOLINE CHLORIDE 20 MG/ML
INJECTION INTRAMUSCULAR; INTRAVENOUS AS NEEDED
Status: DISCONTINUED | OUTPATIENT
Start: 2021-04-01 | End: 2021-04-01 | Stop reason: HOSPADM

## 2021-04-01 RX ORDER — DEXTROSE MONOHYDRATE AND SODIUM CHLORIDE 5; .45 G/100ML; G/100ML
100 INJECTION, SOLUTION INTRAVENOUS CONTINUOUS
Status: DISCONTINUED | OUTPATIENT
Start: 2021-04-01 | End: 2021-04-12 | Stop reason: HOSPADM

## 2021-04-01 RX ORDER — PROPOFOL 10 MG/ML
INJECTION, EMULSION INTRAVENOUS AS NEEDED
Status: DISCONTINUED | OUTPATIENT
Start: 2021-04-01 | End: 2021-04-01 | Stop reason: HOSPADM

## 2021-04-01 RX ORDER — HEPARIN SODIUM 200 [USP'U]/100ML
INJECTION, SOLUTION INTRAVENOUS
Status: DISPENSED
Start: 2021-04-01 | End: 2021-04-02

## 2021-04-01 RX ORDER — PHENYLEPHRINE HCL IN 0.9% NACL 1 MG/10 ML
SYRINGE (ML) INTRAVENOUS AS NEEDED
Status: DISCONTINUED | OUTPATIENT
Start: 2021-04-01 | End: 2021-04-01 | Stop reason: HOSPADM

## 2021-04-01 RX ORDER — HEPARIN SODIUM 1000 [USP'U]/ML
80 INJECTION, SOLUTION INTRAVENOUS; SUBCUTANEOUS ONCE
Status: COMPLETED | OUTPATIENT
Start: 2021-04-01 | End: 2021-04-01

## 2021-04-01 RX ORDER — FAMOTIDINE 20 MG/1
20 TABLET, FILM COATED ORAL ONCE
Status: COMPLETED | OUTPATIENT
Start: 2021-04-01 | End: 2021-04-01

## 2021-04-01 RX ORDER — HEPARIN SODIUM 200 [USP'U]/100ML
INJECTION, SOLUTION INTRAVENOUS
Status: COMPLETED | OUTPATIENT
Start: 2021-04-01 | End: 2021-04-01

## 2021-04-01 RX ORDER — MIDAZOLAM HYDROCHLORIDE 1 MG/ML
INJECTION, SOLUTION INTRAMUSCULAR; INTRAVENOUS AS NEEDED
Status: DISCONTINUED | OUTPATIENT
Start: 2021-04-01 | End: 2021-04-01 | Stop reason: HOSPADM

## 2021-04-01 RX ORDER — HEPARIN SODIUM 1000 [USP'U]/ML
INJECTION, SOLUTION INTRAVENOUS; SUBCUTANEOUS AS NEEDED
Status: DISCONTINUED | OUTPATIENT
Start: 2021-04-01 | End: 2021-04-01 | Stop reason: HOSPADM

## 2021-04-01 RX ORDER — WATER FOR INJECTION,STERILE
VIAL (ML) INJECTION
Status: DISPENSED
Start: 2021-04-01 | End: 2021-04-02

## 2021-04-01 RX ORDER — SODIUM CHLORIDE, SODIUM LACTATE, POTASSIUM CHLORIDE, CALCIUM CHLORIDE 600; 310; 30; 20 MG/100ML; MG/100ML; MG/100ML; MG/100ML
25 INJECTION, SOLUTION INTRAVENOUS CONTINUOUS
Status: DISCONTINUED | OUTPATIENT
Start: 2021-04-01 | End: 2021-04-02 | Stop reason: HOSPADM

## 2021-04-01 RX ORDER — HYDROMORPHONE HYDROCHLORIDE 1 MG/ML
.5-1 INJECTION, SOLUTION INTRAMUSCULAR; INTRAVENOUS; SUBCUTANEOUS
Status: DISCONTINUED | OUTPATIENT
Start: 2021-04-01 | End: 2021-04-12 | Stop reason: HOSPADM

## 2021-04-01 RX ORDER — LIDOCAINE HYDROCHLORIDE 20 MG/ML
INJECTION, SOLUTION EPIDURAL; INFILTRATION; INTRACAUDAL; PERINEURAL AS NEEDED
Status: DISCONTINUED | OUTPATIENT
Start: 2021-04-01 | End: 2021-04-01 | Stop reason: HOSPADM

## 2021-04-01 RX ORDER — EPHEDRINE SULFATE/0.9% NACL/PF 25 MG/5 ML
SYRINGE (ML) INTRAVENOUS AS NEEDED
Status: DISCONTINUED | OUTPATIENT
Start: 2021-04-01 | End: 2021-04-01 | Stop reason: HOSPADM

## 2021-04-01 RX ORDER — ONDANSETRON 2 MG/ML
INJECTION INTRAMUSCULAR; INTRAVENOUS AS NEEDED
Status: DISCONTINUED | OUTPATIENT
Start: 2021-04-01 | End: 2021-04-01 | Stop reason: HOSPADM

## 2021-04-01 RX ORDER — SODIUM CHLORIDE 9 MG/ML
INJECTION, SOLUTION INTRAVENOUS
Status: DISCONTINUED | OUTPATIENT
Start: 2021-04-01 | End: 2021-04-01 | Stop reason: HOSPADM

## 2021-04-01 RX ORDER — LIDOCAINE HYDROCHLORIDE 10 MG/ML
0.1 INJECTION, SOLUTION EPIDURAL; INFILTRATION; INTRACAUDAL; PERINEURAL AS NEEDED
Status: DISCONTINUED | OUTPATIENT
Start: 2021-04-01 | End: 2021-04-02 | Stop reason: HOSPADM

## 2021-04-01 RX ORDER — HEPARIN SODIUM 10000 [USP'U]/100ML
500 INJECTION, SOLUTION INTRAVENOUS CONTINUOUS
Status: DISCONTINUED | OUTPATIENT
Start: 2021-04-01 | End: 2021-04-02

## 2021-04-01 RX ORDER — PHENYLEPHRINE HYDROCHLORIDE 10 MG/ML
INJECTION INTRAVENOUS
Status: DISCONTINUED | OUTPATIENT
Start: 2021-04-01 | End: 2021-04-01 | Stop reason: HOSPADM

## 2021-04-01 RX ORDER — DEXAMETHASONE SODIUM PHOSPHATE 4 MG/ML
INJECTION, SOLUTION INTRA-ARTICULAR; INTRALESIONAL; INTRAMUSCULAR; INTRAVENOUS; SOFT TISSUE AS NEEDED
Status: DISCONTINUED | OUTPATIENT
Start: 2021-04-01 | End: 2021-04-01 | Stop reason: HOSPADM

## 2021-04-01 RX ADMIN — HYDRALAZINE HYDROCHLORIDE 2 MG: 20 INJECTION INTRAMUSCULAR; INTRAVENOUS at 16:53

## 2021-04-01 RX ADMIN — Medication 200 MCG: at 17:35

## 2021-04-01 RX ADMIN — FENTANYL CITRATE 25 MCG: 50 INJECTION, SOLUTION INTRAMUSCULAR; INTRAVENOUS at 16:39

## 2021-04-01 RX ADMIN — Medication 200 MCG: at 17:41

## 2021-04-01 RX ADMIN — Medication 4 MG: at 16:33

## 2021-04-01 RX ADMIN — HYDROMORPHONE HYDROCHLORIDE 0.5 MG: 1 INJECTION, SOLUTION INTRAMUSCULAR; INTRAVENOUS; SUBCUTANEOUS at 19:00

## 2021-04-01 RX ADMIN — PROPOFOL 50 MG: 10 INJECTION, EMULSION INTRAVENOUS at 17:26

## 2021-04-01 RX ADMIN — Medication 200 MCG: at 17:15

## 2021-04-01 RX ADMIN — Medication 200 MCG: at 17:20

## 2021-04-01 RX ADMIN — GLYCOPYRROLATE 0.4 MG: 0.2 INJECTION, SOLUTION INTRAMUSCULAR; INTRAVENOUS at 16:33

## 2021-04-01 RX ADMIN — MIDAZOLAM HYDROCHLORIDE 1 MG: 2 INJECTION, SOLUTION INTRAMUSCULAR; INTRAVENOUS at 14:11

## 2021-04-01 RX ADMIN — FENTANYL CITRATE 25 MCG: 50 INJECTION, SOLUTION INTRAMUSCULAR; INTRAVENOUS at 16:54

## 2021-04-01 RX ADMIN — Medication 5 MG: at 16:47

## 2021-04-01 RX ADMIN — DEXAMETHASONE SODIUM PHOSPHATE 4 MG: 4 INJECTION, SOLUTION INTRAMUSCULAR; INTRAVENOUS at 15:09

## 2021-04-01 RX ADMIN — SODIUM CHLORIDE: 900 INJECTION, SOLUTION INTRAVENOUS at 14:35

## 2021-04-01 RX ADMIN — LIDOCAINE HYDROCHLORIDE 100 MG: 20 INJECTION, SOLUTION EPIDURAL; INFILTRATION; INTRACAUDAL; PERINEURAL at 14:24

## 2021-04-01 RX ADMIN — Medication 10 ML: at 21:43

## 2021-04-01 RX ADMIN — ROCURONIUM BROMIDE 5 MG: 50 INJECTION INTRAVENOUS at 14:24

## 2021-04-01 RX ADMIN — Medication 5 MG: at 15:03

## 2021-04-01 RX ADMIN — OXYCODONE HYDROCHLORIDE AND ACETAMINOPHEN 2 TABLET: 5; 325 TABLET ORAL at 01:15

## 2021-04-01 RX ADMIN — PHENYLEPHRINE HYDROCHLORIDE 20 MCG/KG/MIN: 10 INJECTION INTRAVENOUS at 15:36

## 2021-04-01 RX ADMIN — IOPAMIDOL 100 ML: 755 INJECTION, SOLUTION INTRAVENOUS at 11:00

## 2021-04-01 RX ADMIN — SUCCINYLCHOLINE CHLORIDE 140 MG: 20 INJECTION, SOLUTION INTRAMUSCULAR; INTRAVENOUS at 14:24

## 2021-04-01 RX ADMIN — SODIUM CHLORIDE, SODIUM LACTATE, POTASSIUM CHLORIDE, AND CALCIUM CHLORIDE: 600; 310; 30; 20 INJECTION, SOLUTION INTRAVENOUS at 15:57

## 2021-04-01 RX ADMIN — Medication 200 MCG: at 17:23

## 2021-04-01 RX ADMIN — GABAPENTIN 400 MG: 300 CAPSULE ORAL at 21:43

## 2021-04-01 RX ADMIN — OXYCODONE HYDROCHLORIDE AND ACETAMINOPHEN 2 TABLET: 5; 325 TABLET ORAL at 11:26

## 2021-04-01 RX ADMIN — ROCURONIUM BROMIDE 10 MG: 50 INJECTION INTRAVENOUS at 14:41

## 2021-04-01 RX ADMIN — HYDROMORPHONE HYDROCHLORIDE 0.5 MG: 2 INJECTION, SOLUTION INTRAMUSCULAR; INTRAVENOUS; SUBCUTANEOUS at 11:27

## 2021-04-01 RX ADMIN — ROCURONIUM BROMIDE 20 MG: 50 INJECTION INTRAVENOUS at 14:53

## 2021-04-01 RX ADMIN — OXYCODONE HYDROCHLORIDE AND ACETAMINOPHEN 2 TABLET: 5; 325 TABLET ORAL at 19:36

## 2021-04-01 RX ADMIN — FENTANYL CITRATE 100 MCG: 50 INJECTION, SOLUTION INTRAMUSCULAR; INTRAVENOUS at 14:24

## 2021-04-01 RX ADMIN — HYDROMORPHONE HYDROCHLORIDE 0.5 MG: 2 INJECTION, SOLUTION INTRAMUSCULAR; INTRAVENOUS; SUBCUTANEOUS at 08:05

## 2021-04-01 RX ADMIN — LOSARTAN POTASSIUM 100 MG: 50 TABLET, FILM COATED ORAL at 11:26

## 2021-04-01 RX ADMIN — HEPARIN SODIUM 500 UNITS/HR: 10000 INJECTION, SOLUTION INTRAVENOUS at 20:07

## 2021-04-01 RX ADMIN — ROCURONIUM BROMIDE 10 MG: 50 INJECTION INTRAVENOUS at 15:53

## 2021-04-01 RX ADMIN — THERA TABS 1 TABLET: TAB at 11:27

## 2021-04-01 RX ADMIN — ASPIRIN 81 MG CHEWABLE TABLET 81 MG: 81 TABLET CHEWABLE at 11:26

## 2021-04-01 RX ADMIN — Medication 100 MCG: at 17:18

## 2021-04-01 RX ADMIN — PROPOFOL 200 MG: 10 INJECTION, EMULSION INTRAVENOUS at 14:24

## 2021-04-01 RX ADMIN — DEXTROSE MONOHYDRATE AND SODIUM CHLORIDE 75 ML/HR: 5; .45 INJECTION, SOLUTION INTRAVENOUS at 20:07

## 2021-04-01 RX ADMIN — Medication 200 MCG: at 15:24

## 2021-04-01 RX ADMIN — SODIUM CHLORIDE 2 G: 9 INJECTION, SOLUTION INTRAVENOUS at 14:51

## 2021-04-01 RX ADMIN — PROPOFOL 100 MG: 10 INJECTION, EMULSION INTRAVENOUS at 17:29

## 2021-04-01 RX ADMIN — HYDROMORPHONE HYDROCHLORIDE 1 MG: 1 INJECTION, SOLUTION INTRAMUSCULAR; INTRAVENOUS; SUBCUTANEOUS at 21:44

## 2021-04-01 RX ADMIN — FENTANYL CITRATE 25 MCG: 50 INJECTION, SOLUTION INTRAMUSCULAR; INTRAVENOUS at 16:52

## 2021-04-01 RX ADMIN — GABAPENTIN 400 MG: 300 CAPSULE ORAL at 08:04

## 2021-04-01 RX ADMIN — MIDAZOLAM HYDROCHLORIDE 1 MG: 2 INJECTION, SOLUTION INTRAMUSCULAR; INTRAVENOUS at 14:03

## 2021-04-01 RX ADMIN — HEPARIN SODIUM 7000 UNITS: 1000 INJECTION, SOLUTION INTRAVENOUS; SUBCUTANEOUS at 15:16

## 2021-04-01 RX ADMIN — ONDANSETRON 4 MG: 2 INJECTION INTRAMUSCULAR; INTRAVENOUS at 16:19

## 2021-04-01 RX ADMIN — Medication 200 MCG: at 16:47

## 2021-04-01 RX ADMIN — FUROSEMIDE 20 MG: 20 TABLET ORAL at 11:26

## 2021-04-01 RX ADMIN — ROCURONIUM BROMIDE 15 MG: 50 INJECTION INTRAVENOUS at 14:32

## 2021-04-01 RX ADMIN — ATORVASTATIN CALCIUM 80 MG: 40 TABLET, FILM COATED ORAL at 21:43

## 2021-04-01 RX ADMIN — METOPROLOL TARTRATE 25 MG: 25 TABLET, FILM COATED ORAL at 08:04

## 2021-04-01 RX ADMIN — FAMOTIDINE 20 MG: 20 TABLET ORAL at 19:36

## 2021-04-01 RX ADMIN — FENTANYL CITRATE 25 MCG: 50 INJECTION, SOLUTION INTRAMUSCULAR; INTRAVENOUS at 16:40

## 2021-04-01 RX ADMIN — Medication 200 MCG: at 18:06

## 2021-04-01 RX ADMIN — HEPARIN SODIUM 7580 UNITS: 1000 INJECTION INTRAVENOUS; SUBCUTANEOUS at 08:23

## 2021-04-01 RX ADMIN — HEPARIN SODIUM 3000 UNITS: 1000 INJECTION, SOLUTION INTRAVENOUS; SUBCUTANEOUS at 16:18

## 2021-04-01 NOTE — ANESTHESIA PREPROCEDURE EVALUATION
Relevant Problems   No relevant active problems       Anesthetic History   No history of anesthetic complications            Review of Systems / Medical History  Patient summary reviewed and pertinent labs reviewed    Pulmonary                Comments: Ex heavy smoker  Probable undiagnosed mild COPD   Neuro/Psych             Comments: neuropathy Cardiovascular          CHF: NYHA Classification II, dyspnea on exertion  Dysrhythmias : atrial fibrillation  CAD, PAD and hyperlipidemia    Exercise tolerance: <4 METS  Comments: EF 51% 2020  pulm htn     GI/Hepatic/Renal  Within defined limits              Endo/Other      Hypothyroidism  Arthritis and anemia     Other Findings              Physical Exam    Airway  Mallampati: III  TM Distance: > 6 cm  Neck ROM: normal range of motion   Mouth opening: Normal    Comments: Full beard Cardiovascular  Regular rate and rhythm,  S1 and S2 normal,  no murmur, click, rub, or gallop             Dental    Dentition: Full lower dentures and Full upper dentures     Pulmonary      Decreased breath sounds: bilateral           Abdominal  GI exam deferred       Other Findings            Anesthetic Plan    ASA: 4  Anesthesia type: general      Post-op pain plan if not by surgeon: IV PCA    Induction: Intravenous  Anesthetic plan and risks discussed with: Patient

## 2021-04-01 NOTE — PROGRESS NOTES
attempted several initial visits. The patient was off the floor. Will follow up. Offered prayer on behalf of the patient. Bud Cherry will continue to follow and provide pastoral care as needed or requested. Ryan Cross M.Div.   Bud Cherry  [phone]

## 2021-04-01 NOTE — PROGRESS NOTES
TRANSFER - IN REPORT:    Verbal report received from Asad Hines RN(name) on Diallo Clement  being received from CVT Stepdown (unit) for ordered procedure      Report consisted of patients Situation, Background, Assessment and   Recommendations(SBAR). Information from the following report(s) SBAR, Kardex, [facility] and Recent Results was reviewed with the receiving nurse. Opportunity for questions and clarification was provided. Assessment completed upon patients arrival to unit and care assumed.

## 2021-04-01 NOTE — OP NOTES
Preoperative diagnosis: Occluded right axillofemoral bypass, occluded right femoropopliteal bypass, occluded popliteal and tibial vessels right leg. Postoperative diagnosis: Same    Procedures performed:  #1  Thrombectomy right axillofemoral bypass  #2  Thrombectomy right femoropopliteal bypass  #3  Revision right femoral artery with patch angioplasty  #4  Right leg angiogram, balloon angioplasty of right popliteal artery with angiogram interpretation    Cultures: None    Specimens: None    Drains: None    Estimated blood loss: Less than 50 mL    Assistants: None    Implants: Please see above    Complications: None    Anesthesia: General anesthesia. Indications for the procedure: Kaylah myers a 59 y.o. came to the office with pain was found to have occluded bypass grafts. Patient was given the appropriate risk and benefits of the procedure including but not limited to bleeding, infection, damage to adjacent structures, MI, stroke, death, loss of lower extremity, need for further surgery. Patient was understanding of all the risks and underwent a procedure. Operative findings:   #1  Fresh clot removed from axillofemoral and femoropopliteal bypass scar tissue evident at the confluence of these vessels including the femorofemoral.  Repaired the site with a patch to widen the confluence. Right leg angiogram showed stenosis of the outflow portion of the graft treated with 5 x 40 balloon angioplasty. tPA given to the runoff runoff includes two vessels. Distally no significant runoff to the foot beyond the distal calf. Procedure:  Patient was correctly identified in the pre upper area taken to the operating room in stable condition. Patient had pre-incision timeout prior to any incision. Patient was prepped and draped in the normal sterile fashion according to CDC guidelines aseptic technique.   Main incision in the right groin carried this down to where I exposed the distal axillofemoral graft proximal femoropopliteal graft proximal femorofemoral graft and the femoral artery. Placed Vesseloops on all the above. Gave a 7000 bolus of heparin. Made incision from the femoropopliteal onto the axillofemoral and saw fresh thrombus I thrombectomized the axillofemoral first achieved wonderful inflow. Next I thrombectomized the femoropopliteal and the profunda achieving better than both. Performed an angiogram of the right lower extremity there was narrowing at the graft artery anastomosis and ballooned this with a 5 x 40 balloon twice and then the remainder of the popliteal.  I direct flow into tube tibial vessels. Repaired the site with a patch angioplasty bovine pericardium 5-0 Prolene suture released all the controls it was nice foot had hand-held Doppler at the axillofemoral and the femoropopliteal and the femoral.  Irrigated use Surgicel and Surgifoam and closed interrupted 2-0 Monocryl for the femoral fascia 3-0 Monocryl for subcu 4 Monocryl and Dermabond glue for the skin aid use antibiotic irrigation for closing placed a wound management system for infection prevention. Did not have great pulses in the feet will warm and not heparinized him see how he does overnight with it is having the inflow open.

## 2021-04-01 NOTE — PROGRESS NOTES
Bedside and Verbal shift change report given to [address] [place] [address] St (oncoming nurse) by Jay Louise RN (offgoing nurse). Report included the following information SBAR.    0931:TRANSFER - OUT REPORT:    Verbal report given on Maninder Odom transferred to CT for ordered procedure     Report consisted of patients Situation, Background, Assessment and   Recommendations(SBAR). Information from the following report(s) SBAR was reviewed with the receiving nurse. Lines:   Peripheral IV 03/31/21 Left Hand (Active)   Site Assessment Clean, dry, & intact 03/31/21 1830   Phlebitis Assessment 0 03/31/21 1830   Infiltration Assessment 0 03/31/21 1830   Dressing Status Clean, dry, & intact 03/31/21 1830   Dressing Type Transparent 03/31/21 1830   Hub Color/Line Status Pink;Flushed 03/31/21 1830   Action Taken Open ports on tubing capped 03/31/21 1830   Alcohol Cap Used Yes 03/31/21 1830       Peripheral IV 04/01/21 Right Antecubital (Active)   Opportunity for questions and clarification was provided. Patient transported with:   Tech    1330:TRANSFER - OUT REPORT:    Verbal report given to [id number] RN(name) on Monica Green  being transferred to Pre-Op(unit) for ordered procedure       Report consisted of patients Situation, Background, Assessment and   Recommendations(SBAR). Information from the following report(s) SBAR, Kardex and Cardiac Rhythm NSR was reviewed with the receiving nurse. Lines:   Peripheral IV 03/31/21 Left Hand (Active)   Site Assessment Clean, dry, & intact 04/01/21 1200   Phlebitis Assessment 0 04/01/21 1200   Infiltration Assessment 0 04/01/21 1200   Dressing Status Clean, dry, & intact 04/01/21 1200   Dressing Type Transparent 04/01/21 1200   Hub Color/Line Status Pink;Capped; End cap changed 04/01/21 1200   Action Taken Open ports on tubing capped 04/01/21 1200   Alcohol Cap Used Yes 04/01/21 1200       Peripheral IV 04/01/21 Right Antecubital (Active)   Site Assessment Clean, dry, & intact 04/01/21 1126   Phlebitis Assessment 0 04/01/21 1200   Infiltration Assessment 0 04/01/21 1200   Dressing Status Clean, dry, & intact 04/01/21 1200   Dressing Type Transparent 04/01/21 1200   Hub Color/Line Status Pink;End cap changed; Patent 04/01/21 1200   Action Taken Open ports on tubing capped 04/01/21 1200   Alcohol Cap Used Yes 04/01/21 1200        Opportunity for questions and clarification was provided.       Patient transported with:   Randal Gallardo

## 2021-04-01 NOTE — PROGRESS NOTES
1915 - Bedside shift change report [facility] going [facility]) by Janee Cruz RN (offgoing nurse) to BEAU Pham. Report included the following information SBAR, Kardex, Procedure Summary, Intake/Output, MAR, Recent Results and Cardiac Rhythm NSR.     1930 - Patient returned from CT.  Patient PIV blown attempting to do procedure in ct

## 2021-04-01 NOTE — PROGRESS NOTES
1805- Pt received from the OR. Pt drowsy, opens eyes to voice, following commands and moving all extremities. SR 60's. RLE cold to the touch. Unable to doppler DP, PT pulses. Per OR nurse this was the case just before leaving the OR as well. 1820- Still unable to doppler pulses. Dr Fran Hyatt made aware and at bedside now. Subclavian graft patent with palpable pulse. 1925- Report given to Moriah Suazo RN.

## 2021-04-01 NOTE — PROGRESS NOTES
TRANSFER - OUT REPORT:    Verbal report given to Patrick Campos RN(name) on Kaylah Inch  being transferred to CVT ICU (unit) for routine post - op       Report consisted of patients Situation, Background, Assessment and   Recommendations(SBAR). Information from the following report(s) SBAR, OR Summary, Procedure Summary, Recent Results and Cardiac Rhythm NSR was reviewed with the receiving nurse. Lines:   Peripheral IV 03/31/21 Left Hand (Active)   Site Assessment Clean, dry, & intact 04/01/21 1200   Phlebitis Assessment 0 04/01/21 1200   Infiltration Assessment 0 04/01/21 1200   Dressing Status Clean, dry, & intact 04/01/21 1200   Dressing Type Transparent 04/01/21 1200   Hub Color/Line Status Pink;Capped; End cap changed 04/01/21 1200   Action Taken Open ports on tubing capped 04/01/21 1200   Alcohol Cap Used Yes 04/01/21 1200       Peripheral IV 04/01/21 Right Arm (Active)        Opportunity for questions and clarification was provided.       Patient transported with:  Registered Nurse  Anesthesia Staff  Monitor  Oxygen

## 2021-04-01 NOTE — CONSULTS
[facility] - Consult Note    Consultation request by Dr. Marianela Magallon for advice/opinion related to evaluating abnormal EKG    Date of  Admission: 3/31/2021  8:42 AM   Primary Care Physician:  Jalen Sosa MD     Assessment:     -Presented for angiography R leg due to R leg arterial occlusion - occluded R leg bypass graft. Hx peripheral vascular disease s/p left AKA  -Consulted due to abnormal EKG  -Paroxysmal atrial fibrillation, historically on Coumadin for anticoagulation, s/p BERRY/cardioversion 4/18/2017  -Hx Nonischemic cardiomyopathy  -Echo 10/2017 with findings as follows: EF 55%, hypokinesis of basal inferior walls, normal LV wall thickness, normal diastolic function, mildly dilated RA, trivial mitral regurgitation  -CAD, cardiac cath 3/8/2017 with findings as follows:  · EF around 30%, no diagnostic segmental wall motion abnormality  · LM: Diffuse 30%  · LAD: Diffuse 30%  · Diagonal branches: Diffuse 30%  · Cx: Proximal 70%; trifurcation healed ruptured plaque with residual 90% (RYLEE 3)  · OM branches: Angiographically normal  · RCA: Diffuse 50% with focal mid 90%  -S/p successful stenting of Cx and RCA to 0% during subsequent cardiac cath 3/15/2017  -Nuclear stress test 11/2020 was low-risk, EF 51%  -Dyslipidemia    Previously followed by Dr. Guzman Mills, first appointment with Dr. Sheldon Fontanez 4/27/2021     Plan:     Addendum: Independently seen and evaluated. Agree with below. Symptomatically, there is no evidence of heart failure or unstable angina. His electrocardiogram was reviewed. There are no significant changes from tracing from 2015. No new cardiac recommendations recommended at this time. Pt without symptoms to suggest angina or decompensated heart failure, okay to proceed with vascular procedure today as planned from cardiac standpoint. History of Present Illness:      This is a 59 y.o. male admitted for Atherosclerosis of native artery of right lower extremity with intermittent claudication (HCC) [I70.211]  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[address] [I70.201]. Patient complains of:  R leg pain    Akbar Krueger is a 59 y.o. male with PMHx as described above, who presented to the hospital for RLE angiography due to R calf pain. Pt states he was initially seen by his podiatrist, Dr. Nicole Boyle, who referred him back to Dr. Fahad Schwab, as pt was noted to have absent pulse. He underwent imaging of RLE and was found to have occluded R leg bypass and was admitted for angiography of RLE. Pt denies any recent cardiac symptoms to include chest pain/pressure, shortness of breath, PND/orthopnea. States he was able to do his usual activities without difficulty prior to the onset of his calf pain. Cardiac risk factors: dyslipidemia, male gender, known Hx CAD      Review of Symptoms:  Except as stated above include:  Constitutional:  negative  Respiratory:  negative  Cardiovascular:  negative  Gastrointestinal: negative  Genitourinary:  negative  Musculoskeletal:  Negative  Neurological:  Negative  Dermatological:  Negative  Endocrinological: Negative  Psychological:  Negative       Past Medical History:     Past Medical History:   Diagnosis Date    Acute blood loss as cause of postoperative anemia 4/4/2017    Anticoagulated on Coumadin     Aortoiliac occlusive disease ([address] 1/25/2017    Atherosclerosis of native artery of both lower extremities with intermittent claudication ([address] 1/25/2017    Benign hypertensive heart disease with systolic congestive heart failure, NYHA class 2 ([address] 1/26/2015    Carotid artery disease (HCC)     Chronic anemia     Chronic systolic heart failure (HCC)     Chronic ulcer of right foot ([address] 4/4/2017    Chronic ulcer of right heel ([address] 8/8/2017    Coronary artery disease involving native coronary artery of native heart 3/15/2017    Successful stenting of Cx (Xience LESLEY) and RCA (Xience LESLEY) to 0% by Dr. Mariana Jackson on 3/15/17.     DDD (degenerative disc disease), lumbar 1/26/2015    Dyslipidemia     Erectile dysfunction 7/5/2016    Euthyroid sick syndrome 4/6/2017    Hereditary peripheral neuropathy 11/15/2016    History of cardioversion 4/18/2017    S/P Synchronized external cardioversion (4/18/2017 - Dr. Kymberly Lang)    History of vitamin D deficiency 4/22/2017    Vitamin D 25-Hydroxy (4/22/2017) = 12.0; Vitamin D 25-Hydroxy (5/26/2017) = 38.7    Infection of vascular bypass graft ([facility] 7/24/2017    Left lower extremity    Ischemic cardiomyopathy     Moderate to severe pulmonary hypertension ([address] 7/23/2017    Paroxysmal atrial fibrillation (HCC)     Peripheral artery disease ([facility] 1/25/2017    Skin ulcer of malleolar area of right ankle ([facility]     Spinal stenosis of lumbar region with radiculopathy 5/4/2015    Dr. Joe Moore         Social History:     Social History     Socioeconomic History    Marital status: LEGALLY      Spouse name: Not on file    Number of children: Not on file    Years of education: Not on file    Highest education level: Not on file   Tobacco Use    Smoking status: Former Smoker    Smokeless tobacco: Never Used    Tobacco comment: quit in 2011   Substance and Sexual Activity    Alcohol use: Yes     Alcohol/week: 2.0 standard drinks     Types: 2 Cans of beer per week     Comment: 10 cans of beer a month    Drug use: Yes     Types: Marijuana     Comment: occasionally-last used 4/17    Sexual activity: Yes     Partners: Female        Family History:     Family History   Problem Relation Age of Onset    Heart Disease Father         Medications: Allergies   Allergen Reactions    Morphine Other (comments)     Patient gets confused with morphine.         Current Facility-Administered Medications   Medication Dose Route Frequency    sodium chloride (NS) flush 5-40 mL  5-40 mL IntraVENous Q8H    sodium chloride (NS) flush 5-40 mL  5-40 mL IntraVENous PRN    HYDROmorphone (DILAUDID) injection 0.5 mg  0.5 mg IntraVENous Q5MIN PRN    diphenhydrAMINE (BENADRYL) injection 12.5 mg  12.5 mg IntraVENous Multiple    aspirin chewable tablet 81 mg  81 mg Oral DAILY    atorvastatin (LIPITOR) tablet 80 mg  80 mg Oral QHS    furosemide (LASIX) tablet 20 mg  20 mg Oral DAILY    gabapentin (NEURONTIN) capsule 400 mg  400 mg Oral TID    losartan (COZAAR) tablet 100 mg  100 mg Oral DAILY    metoprolol tartrate (LOPRESSOR) tablet 25 mg  25 mg Oral BID    therapeutic multivitamin (THERAGRAN) tablet 1 Tab  1 Tab Oral DAILY    heparin 25,000 units in D5W 250 ml infusion  12-25 Units/kg/hr IntraVENous TITRATE    oxyCODONE-acetaminophen (PERCOCET) 5-325 mg per tablet 1-2 Tab  1-2 Tab Oral Q4H PRN    lactated Ringers infusion  25 mL/hr IntraVENous CONTINUOUS         Physical Exam:     Visit Vitals  BP (!) 171/88   Pulse 71   Temp 98.7 °F (37.1 °C)   Resp 20   Ht 5' 10\" (1.778 m)   Wt 94.8 kg (208 lb 14.4 oz)   SpO2 96%   BMI 29.97 kg/m²     BP Readings from Last 3 Encounters:   04/01/21 (!) 171/88   03/16/21 126/74   03/08/21 110/80     Pulse Readings from Last 3 Encounters:   04/01/21 71   03/16/21 80   03/08/21 76     Wt Readings from Last 3 Encounters:   04/01/21 94.8 kg (208 lb 14.4 oz)   03/16/21 92.5 kg (204 lb)   11/10/20 93.4 kg (206 lb)       General:  alert, cooperative, no distress, appears stated age  Neck:  supple  Lungs:  clear to auscultation bilaterally  Heart:  Regular rate and rhythm  Abdomen:  abdomen is soft without significant tenderness, masses, organomegaly or guarding  Extremities:  L AKA  Skin: Warm and dry.    Neuro: alert, oriented x3, affect appropriate, no focal neurological deficits, moves all extremities well, no involuntary movements  Psych: non focal     Data Review:     Recent Labs     03/31/21  2354 03/30/21  0812   WBC 12.4 11.4   HGB 14.0 14.6   HCT 40.1 42.1    398     Recent Labs     03/31/21  2354 03/30/21  0812 03/30/21  0749    140 141   K 3.5 4.4 4.4   * 109 110   CO2 24 21 21   GLU 98 90 89   BUN 8 8 8   CREA 0.63 0.72 0.71   CA 8.1* 8.5 8.6   ALB  --   --  3.6   ALT  --   --  33   INR  --  1.1  --        Results for orders placed or performed during the hospital encounter of 03/31/21   EKG, 12 LEAD, INITIAL   Result Value Ref Range    Ventricular Rate 64 BPM    Atrial Rate 64 BPM    P-R Interval 168 ms    QRS Duration 82 ms    Q-T Interval 412 ms    QTC Calculation (Bezet) 425 ms    Calculated P Axis 50 degrees    Calculated R Axis -13 degrees    Calculated T Axis 8 degrees    Diagnosis       Normal sinus rhythm  Inferior infarct , age undetermined  Cannot rule out Anterior infarct , age undetermined  Abnormal ECG  When compared with ECG of 10-NOV-2020 09:20,  Inferior infarct is now present     Results for orders placed or performed in visit on 10/27/20   AMB POC EKG ROUTINE W/ 12 LEADS, INTER & REP    Narrative    Normal sinus rhythm, rate 81. Occasional PVCs. Compared to the EKG of October 1, 2020 there was no significant herbal change.        All Cardiac Markers in the last 24 hours:  No results found for: CPK, CK, CKMMB, CKMB, RCK3, CKMBT, CKNDX, CKND1, LEO, TROPT, TROIQ, DEWAYNE, TROPT, TNIPOC, BNP, BNPP    Last Lipid:    Lab Results   Component Value Date/Time    Cholesterol, total 105 09/08/2020 07:09 AM    HDL Cholesterol 26 (L) 09/08/2020 07:09 AM    LDL, calculated 60.4 09/08/2020 07:09 AM    Triglyceride 93 09/08/2020 07:09 AM    CHOL/HDL Ratio 4.0 09/08/2020 07:09 AM       Signed By: Yvonne Cook PA-C     April 1, 2021

## 2021-04-01 NOTE — ANESTHESIA POSTPROCEDURE EVALUATION
Procedure(s):  RIGHT LEG THROMBECTOMY; RIGHT AXILLO FEMORAL THROMBECTOMY; REVISION RIGHT FEMORAL ARTERY AND BYPASS/ Patch Angioplasty balloon angioplasty right popilteal.    general    Anesthesia Post Evaluation      Multimodal analgesia: multimodal analgesia used between 6 hours prior to anesthesia start to PACU discharge  Patient location during evaluation: ICU  Patient participation: complete - patient participated  Level of consciousness: lethargic  Pain score: 3  Pain management: adequate  Airway patency: patent  Anesthetic complications: no  Cardiovascular status: acceptable  Respiratory status: acceptable    Final Post Anesthesia Temperature Assessment:  Normothermia (36.0-37.5 degrees C)      INITIAL Post-op Vital signs:   Vitals Value Taken Time   BP 88/63 04/01/21 1900   Temp 37.3 °C (99.1 °F) 04/01/21 1806   Pulse 89 04/01/21 1901   Resp 37 04/01/21 1901   SpO2 94 % 04/01/21 1901   Vitals shown include unvalidated device data.

## 2021-04-02 ENCOUNTER — APPOINTMENT (OUTPATIENT)
Dept: GENERAL RADIOLOGY | Age: 61
DRG: 253 | End: 2021-04-02
Attending: SURGERY
Payer: COMMERCIAL

## 2021-04-02 ENCOUNTER — APPOINTMENT (OUTPATIENT)
Dept: NON INVASIVE DIAGNOSTICS | Age: 61
DRG: 253 | End: 2021-04-02
Attending: SURGERY
Payer: COMMERCIAL

## 2021-04-02 ENCOUNTER — ANESTHESIA EVENT (OUTPATIENT)
Dept: CARDIOTHORACIC SURGERY | Age: 61
DRG: 253 | End: 2021-04-02
Payer: COMMERCIAL

## 2021-04-02 ENCOUNTER — ANESTHESIA (OUTPATIENT)
Dept: CARDIOTHORACIC SURGERY | Age: 61
DRG: 253 | End: 2021-04-02
Payer: COMMERCIAL

## 2021-04-02 LAB
ANION GAP SERPL CALC-SCNC: 9 MMOL/L (ref 3–18)
ATRIAL RATE: 80 BPM
BASOPHILS # BLD: 0 K/UL (ref 0–0.1)
BASOPHILS NFR BLD: 0 % (ref 0–2)
BUN SERPL-MCNC: 9 MG/DL (ref 7–18)
BUN/CREAT SERPL: 11 (ref 12–20)
CALCIUM SERPL-MCNC: 8.1 MG/DL (ref 8.5–10.1)
CALCULATED P AXIS, ECG09: 51 DEGREES
CALCULATED R AXIS, ECG10: -8 DEGREES
CALCULATED T AXIS, ECG11: 23 DEGREES
CHLORIDE SERPL-SCNC: 108 MMOL/L (ref 100–111)
CO2 SERPL-SCNC: 23 MMOL/L (ref 21–32)
CREAT SERPL-MCNC: 0.81 MG/DL (ref 0.6–1.3)
DIAGNOSIS, 93000: NORMAL
DIFFERENTIAL METHOD BLD: ABNORMAL
ECHO AO ROOT DIAM: 3.46 CM
ECHO LA AREA 4C: 17.99 CM2
ECHO LA VOL 4C: 43.21 ML (ref 18–58)
ECHO LA VOLUME INDEX A4C: 20.36 ML/M2 (ref 16–28)
ECHO LV E' SEPTAL VELOCITY: 6.81 CM/S
ECHO LV INTERNAL DIMENSION DIASTOLIC: 5.22 CM (ref 4.2–5.9)
ECHO LV INTERNAL DIMENSION SYSTOLIC: 3.46 CM
ECHO LV IVSD: 1.12 CM (ref 0.6–1)
ECHO LV MASS 2D: 208.6 G (ref 88–224)
ECHO LV MASS INDEX 2D: 98.3 G/M2 (ref 49–115)
ECHO LV POSTERIOR WALL DIASTOLIC: 0.98 CM (ref 0.6–1)
ECHO LVOT CARDIAC OUTPUT: 3.68 LITER/MINUTE
ECHO LVOT DIAM: 2.1 CM
ECHO LVOT PEAK GRADIENT: 2.36 MMHG
ECHO LVOT PEAK VELOCITY: 76.88 CM/S
ECHO LVOT SV: 47.5 ML
ECHO LVOT VTI: 13.68 CM
ECHO MV A VELOCITY: 70.84 CM/S
ECHO MV E DECELERATION TIME (DT): 147.55 MS
ECHO MV E VELOCITY: 70.55 CM/S
ECHO MV E/A RATIO: 1
ECHO MV E/E' SEPTAL: 10.36
ECHO RV TAPSE: 1.46 CM (ref 1.5–2)
EOSINOPHIL # BLD: 0 K/UL (ref 0–0.4)
EOSINOPHIL NFR BLD: 0 % (ref 0–5)
ERYTHROCYTE [DISTWIDTH] IN BLOOD BY AUTOMATED COUNT: 14.6 % (ref 11.6–14.5)
GLUCOSE SERPL-MCNC: 135 MG/DL (ref 74–99)
HCT VFR BLD AUTO: 33.4 % (ref 36–48)
HGB BLD-MCNC: 11.3 G/DL (ref 13–16)
LVOT MG: 0.99 MMHG
LYMPHOCYTES # BLD: 2.5 K/UL (ref 0.9–3.6)
LYMPHOCYTES NFR BLD: 14 % (ref 21–52)
MCH RBC QN AUTO: 30.6 PG (ref 24–34)
MCHC RBC AUTO-ENTMCNC: 33.8 G/DL (ref 31–37)
MCV RBC AUTO: 90.5 FL (ref 74–97)
MONOCYTES # BLD: 0.4 K/UL (ref 0.05–1.2)
MONOCYTES NFR BLD: 2 % (ref 3–10)
NEUTS SEG # BLD: 14.6 K/UL (ref 1.8–8)
NEUTS SEG NFR BLD: 84 % (ref 40–73)
P-R INTERVAL, ECG05: 158 MS
PLATELET # BLD AUTO: 312 K/UL (ref 135–420)
PMV BLD AUTO: 9.4 FL (ref 9.2–11.8)
POTASSIUM SERPL-SCNC: 3.7 MMOL/L (ref 3.5–5.5)
Q-T INTERVAL, ECG07: 406 MS
QRS DURATION, ECG06: 84 MS
QTC CALCULATION (BEZET), ECG08: 468 MS
RBC # BLD AUTO: 3.69 M/UL (ref 4.7–5.5)
RBC MORPH BLD: ABNORMAL
SODIUM SERPL-SCNC: 140 MMOL/L (ref 136–145)
VENTRICULAR RATE, ECG03: 80 BPM
WBC # BLD AUTO: 17.5 K/UL (ref 4.6–13.2)

## 2021-04-02 PROCEDURE — 01924 ANES THER IVNTL RAD ARTL NOS: CPT | Performed by: ANESTHESIOLOGY

## 2021-04-02 PROCEDURE — 76010000108 HC CV SURG 2 TO 2.5 HR: Performed by: SURGERY

## 2021-04-02 PROCEDURE — 74011250636 HC RX REV CODE- 250/636: Performed by: NURSE ANESTHETIST, CERTIFIED REGISTERED

## 2021-04-02 PROCEDURE — 77030040830 HC CATH URETH FOL MDII -A: Performed by: SURGERY

## 2021-04-02 PROCEDURE — 047P3ZZ DILATION OF RIGHT ANTERIOR TIBIAL ARTERY, PERCUTANEOUS APPROACH: ICD-10-PCS | Performed by: SURGERY

## 2021-04-02 PROCEDURE — C1725 CATH, TRANSLUMIN NON-LASER: HCPCS | Performed by: SURGERY

## 2021-04-02 PROCEDURE — B41F1ZZ FLUOROSCOPY OF RIGHT LOWER EXTREMITY ARTERIES USING LOW OSMOLAR CONTRAST: ICD-10-PCS | Performed by: SURGERY

## 2021-04-02 PROCEDURE — 36415 COLL VENOUS BLD VENIPUNCTURE: CPT

## 2021-04-02 PROCEDURE — 77030038692 HC WND DEB SYS IRMX -B: Performed by: SURGERY

## 2021-04-02 PROCEDURE — 77030010507 HC ADH SKN DERMBND J&J -B: Performed by: SURGERY

## 2021-04-02 PROCEDURE — 04CR0ZZ EXTIRPATION OF MATTER FROM RIGHT POSTERIOR TIBIAL ARTERY, OPEN APPROACH: ICD-10-PCS | Performed by: SURGERY

## 2021-04-02 PROCEDURE — 047M3ZZ DILATION OF RIGHT POPLITEAL ARTERY, PERCUTANEOUS APPROACH: ICD-10-PCS | Performed by: SURGERY

## 2021-04-02 PROCEDURE — 04CK0ZZ EXTIRPATION OF MATTER FROM RIGHT FEMORAL ARTERY, OPEN APPROACH: ICD-10-PCS | Performed by: SURGERY

## 2021-04-02 PROCEDURE — 77030004565 HC CATH ANGI DX TMPO CARD -B: Performed by: SURGERY

## 2021-04-02 PROCEDURE — 74011250636 HC RX REV CODE- 250/636: Performed by: SURGERY

## 2021-04-02 PROCEDURE — 99232 SBSQ HOSP IP/OBS MODERATE 35: CPT | Performed by: INTERNAL MEDICINE

## 2021-04-02 PROCEDURE — 65620000000 HC RM CCU GENERAL

## 2021-04-02 PROCEDURE — 77030002924 HC SUT GORTX WLGO -B: Performed by: SURGERY

## 2021-04-02 PROCEDURE — 74011000250 HC RX REV CODE- 250: Performed by: SURGERY

## 2021-04-02 PROCEDURE — C1894 INTRO/SHEATH, NON-LASER: HCPCS | Performed by: SURGERY

## 2021-04-02 PROCEDURE — 01924 ANES THER IVNTL RAD ARTL NOS: CPT | Performed by: NURSE ANESTHETIST, CERTIFIED REGISTERED

## 2021-04-02 PROCEDURE — 77030033649 HC DRSG INCIS MGMT KT KCON -F: Performed by: SURGERY

## 2021-04-02 PROCEDURE — 77030002933 HC SUT MCRYL J&J -A: Performed by: SURGERY

## 2021-04-02 PROCEDURE — 74011000636 HC RX REV CODE- 636: Performed by: SURGERY

## 2021-04-02 PROCEDURE — 37248 TRLUML BALO ANGIOP 1ST VEIN: CPT

## 2021-04-02 PROCEDURE — 3E05317 INTRODUCTION OF OTHER THROMBOLYTIC INTO PERIPHERAL ARTERY, PERCUTANEOUS APPROACH: ICD-10-PCS | Performed by: SURGERY

## 2021-04-02 PROCEDURE — 76060000035 HC ANESTHESIA 2 TO 2.5 HR: Performed by: SURGERY

## 2021-04-02 PROCEDURE — 80048 BASIC METABOLIC PNL TOTAL CA: CPT

## 2021-04-02 PROCEDURE — 77030002996 HC SUT SLK J&J -A: Performed by: SURGERY

## 2021-04-02 PROCEDURE — 77030011265 HC ELECTRD BLD HEX COVD -A: Performed by: SURGERY

## 2021-04-02 PROCEDURE — 77030014023 HC SYR INFL LVEEN BSC -B: Performed by: SURGERY

## 2021-04-02 PROCEDURE — 94762 N-INVAS EAR/PLS OXIMTRY CONT: CPT

## 2021-04-02 PROCEDURE — 77030025869: Performed by: SURGERY

## 2021-04-02 PROCEDURE — 77030018390 HC SPNG HEMSTAT2 J&J -B: Performed by: SURGERY

## 2021-04-02 PROCEDURE — 93306 TTE W/DOPPLER COMPLETE: CPT | Performed by: INTERNAL MEDICINE

## 2021-04-02 PROCEDURE — 2709999900 HC NON-CHARGEABLE SUPPLY: Performed by: SURGERY

## 2021-04-02 PROCEDURE — 77030010512 HC APPL CLP LIG J&J -C: Performed by: SURGERY

## 2021-04-02 PROCEDURE — 93306 TTE W/DOPPLER COMPLETE: CPT

## 2021-04-02 PROCEDURE — 74011250636 HC RX REV CODE- 250/636: Performed by: PHYSICIAN ASSISTANT

## 2021-04-02 PROCEDURE — 74011000250 HC RX REV CODE- 250: Performed by: NURSE ANESTHETIST, CERTIFIED REGISTERED

## 2021-04-02 PROCEDURE — 85025 COMPLETE CBC W/AUTO DIFF WBC: CPT

## 2021-04-02 PROCEDURE — 77030002987 HC SUT PROL J&J -B: Performed by: SURGERY

## 2021-04-02 PROCEDURE — 04CP0ZZ EXTIRPATION OF MATTER FROM RIGHT ANTERIOR TIBIAL ARTERY, OPEN APPROACH: ICD-10-PCS | Performed by: SURGERY

## 2021-04-02 PROCEDURE — 74011000250 HC RX REV CODE- 250: Performed by: ANESTHESIOLOGY

## 2021-04-02 PROCEDURE — C1769 GUIDE WIRE: HCPCS | Performed by: SURGERY

## 2021-04-02 PROCEDURE — 77030002986 HC SUT PROL J&J -A: Performed by: SURGERY

## 2021-04-02 PROCEDURE — 74011250637 HC RX REV CODE- 250/637: Performed by: SURGERY

## 2021-04-02 PROCEDURE — 2709999900 HC NON-CHARGEABLE SUPPLY

## 2021-04-02 PROCEDURE — C1757 CATH, THROMBECTOMY/EMBOLECT: HCPCS | Performed by: SURGERY

## 2021-04-02 RX ORDER — HEPARIN SODIUM 10000 [USP'U]/100ML
500 INJECTION, SOLUTION INTRAVENOUS CONTINUOUS
Status: DISCONTINUED | OUTPATIENT
Start: 2021-04-02 | End: 2021-04-04

## 2021-04-02 RX ORDER — CEFAZOLIN SODIUM 2 G/50ML
SOLUTION INTRAVENOUS
Status: DISPENSED
Start: 2021-04-02 | End: 2021-04-03

## 2021-04-02 RX ORDER — HEPARIN SODIUM 200 [USP'U]/100ML
INJECTION, SOLUTION INTRAVENOUS
Status: DISPENSED
Start: 2021-04-02 | End: 2021-04-03

## 2021-04-02 RX ORDER — HEPARIN SODIUM 1000 [USP'U]/ML
INJECTION, SOLUTION INTRAVENOUS; SUBCUTANEOUS AS NEEDED
Status: DISCONTINUED | OUTPATIENT
Start: 2021-04-02 | End: 2021-04-02 | Stop reason: HOSPADM

## 2021-04-02 RX ORDER — LIDOCAINE HYDROCHLORIDE 10 MG/ML
INJECTION, SOLUTION EPIDURAL; INFILTRATION; INTRACAUDAL; PERINEURAL
Status: DISCONTINUED
Start: 2021-04-02 | End: 2021-04-02 | Stop reason: WASHOUT

## 2021-04-02 RX ORDER — WATER FOR INJECTION,STERILE
VIAL (ML) INJECTION
Status: DISPENSED
Start: 2021-04-02 | End: 2021-04-03

## 2021-04-02 RX ORDER — PROPOFOL 10 MG/ML
INJECTION, EMULSION INTRAVENOUS AS NEEDED
Status: DISCONTINUED | OUTPATIENT
Start: 2021-04-02 | End: 2021-04-02 | Stop reason: HOSPADM

## 2021-04-02 RX ORDER — PHENYLEPHRINE HCL IN 0.9% NACL 1 MG/10 ML
SYRINGE (ML) INTRAVENOUS AS NEEDED
Status: DISCONTINUED | OUTPATIENT
Start: 2021-04-02 | End: 2021-04-02 | Stop reason: HOSPADM

## 2021-04-02 RX ORDER — MAGNESIUM SULFATE HEPTAHYDRATE 40 MG/ML
2 INJECTION, SOLUTION INTRAVENOUS ONCE
Status: COMPLETED | OUTPATIENT
Start: 2021-04-02 | End: 2021-04-03

## 2021-04-02 RX ORDER — CEFAZOLIN SODIUM 1 G/3ML
INJECTION, POWDER, FOR SOLUTION INTRAMUSCULAR; INTRAVENOUS AS NEEDED
Status: DISCONTINUED | OUTPATIENT
Start: 2021-04-02 | End: 2021-04-02 | Stop reason: HOSPADM

## 2021-04-02 RX ORDER — FENTANYL CITRATE 50 UG/ML
INJECTION, SOLUTION INTRAMUSCULAR; INTRAVENOUS AS NEEDED
Status: DISCONTINUED | OUTPATIENT
Start: 2021-04-02 | End: 2021-04-02 | Stop reason: HOSPADM

## 2021-04-02 RX ORDER — MIDAZOLAM HYDROCHLORIDE 1 MG/ML
INJECTION, SOLUTION INTRAMUSCULAR; INTRAVENOUS AS NEEDED
Status: DISCONTINUED | OUTPATIENT
Start: 2021-04-02 | End: 2021-04-02 | Stop reason: HOSPADM

## 2021-04-02 RX ORDER — ONDANSETRON 2 MG/ML
INJECTION INTRAMUSCULAR; INTRAVENOUS AS NEEDED
Status: DISCONTINUED | OUTPATIENT
Start: 2021-04-02 | End: 2021-04-02 | Stop reason: HOSPADM

## 2021-04-02 RX ORDER — SODIUM CHLORIDE, SODIUM LACTATE, POTASSIUM CHLORIDE, CALCIUM CHLORIDE 600; 310; 30; 20 MG/100ML; MG/100ML; MG/100ML; MG/100ML
INJECTION, SOLUTION INTRAVENOUS
Status: DISCONTINUED | OUTPATIENT
Start: 2021-04-02 | End: 2021-04-02 | Stop reason: HOSPADM

## 2021-04-02 RX ORDER — IODIXANOL 320 MG/ML
INJECTION, SOLUTION INTRAVASCULAR
Status: DISCONTINUED
Start: 2021-04-02 | End: 2021-04-02 | Stop reason: WASHOUT

## 2021-04-02 RX ORDER — HEPARIN SODIUM 200 [USP'U]/100ML
INJECTION, SOLUTION INTRAVENOUS
Status: COMPLETED | OUTPATIENT
Start: 2021-04-02 | End: 2021-04-02

## 2021-04-02 RX ORDER — IODIXANOL 320 MG/ML
INJECTION, SOLUTION INTRAVASCULAR AS NEEDED
Status: DISCONTINUED | OUTPATIENT
Start: 2021-04-02 | End: 2021-04-02 | Stop reason: HOSPADM

## 2021-04-02 RX ORDER — LIDOCAINE HYDROCHLORIDE 20 MG/ML
INJECTION, SOLUTION EPIDURAL; INFILTRATION; INTRACAUDAL; PERINEURAL AS NEEDED
Status: DISCONTINUED | OUTPATIENT
Start: 2021-04-02 | End: 2021-04-02 | Stop reason: HOSPADM

## 2021-04-02 RX ORDER — EPHEDRINE SULFATE/0.9% NACL/PF 25 MG/5 ML
SYRINGE (ML) INTRAVENOUS AS NEEDED
Status: DISCONTINUED | OUTPATIENT
Start: 2021-04-02 | End: 2021-04-02 | Stop reason: HOSPADM

## 2021-04-02 RX ADMIN — Medication 200 MCG: at 14:49

## 2021-04-02 RX ADMIN — Medication 10 ML: at 05:15

## 2021-04-02 RX ADMIN — DEXTROSE MONOHYDRATE AND SODIUM CHLORIDE 75 ML/HR: 5; .45 INJECTION, SOLUTION INTRAVENOUS at 05:15

## 2021-04-02 RX ADMIN — Medication 100 MCG: at 15:01

## 2021-04-02 RX ADMIN — HEPARIN SODIUM 5000 UNITS: 1000 INJECTION, SOLUTION INTRAVENOUS; SUBCUTANEOUS at 14:55

## 2021-04-02 RX ADMIN — Medication 200 MCG: at 15:04

## 2021-04-02 RX ADMIN — Medication 200 MCG: at 15:08

## 2021-04-02 RX ADMIN — FENTANYL CITRATE 25 MCG: 50 INJECTION, SOLUTION INTRAMUSCULAR; INTRAVENOUS at 14:15

## 2021-04-02 RX ADMIN — HYDROMORPHONE HYDROCHLORIDE 1 MG: 1 INJECTION, SOLUTION INTRAMUSCULAR; INTRAVENOUS; SUBCUTANEOUS at 09:05

## 2021-04-02 RX ADMIN — PROPOFOL 150 MG: 10 INJECTION, EMULSION INTRAVENOUS at 14:22

## 2021-04-02 RX ADMIN — HYDROMORPHONE HYDROCHLORIDE 1 MG: 1 INJECTION, SOLUTION INTRAMUSCULAR; INTRAVENOUS; SUBCUTANEOUS at 20:15

## 2021-04-02 RX ADMIN — METOPROLOL TARTRATE 25 MG: 25 TABLET, FILM COATED ORAL at 18:17

## 2021-04-02 RX ADMIN — OXYCODONE HYDROCHLORIDE AND ACETAMINOPHEN 2 TABLET: 5; 325 TABLET ORAL at 01:22

## 2021-04-02 RX ADMIN — FENTANYL CITRATE 50 MCG: 50 INJECTION, SOLUTION INTRAMUSCULAR; INTRAVENOUS at 15:13

## 2021-04-02 RX ADMIN — FENTANYL CITRATE 25 MCG: 50 INJECTION, SOLUTION INTRAMUSCULAR; INTRAVENOUS at 14:58

## 2021-04-02 RX ADMIN — Medication 10 ML: at 16:40

## 2021-04-02 RX ADMIN — MIDAZOLAM HYDROCHLORIDE 1 MG: 2 INJECTION, SOLUTION INTRAMUSCULAR; INTRAVENOUS at 14:15

## 2021-04-02 RX ADMIN — Medication 100 MCG: at 14:28

## 2021-04-02 RX ADMIN — THERA TABS 1 TABLET: TAB at 10:37

## 2021-04-02 RX ADMIN — GABAPENTIN 400 MG: 300 CAPSULE ORAL at 18:17

## 2021-04-02 RX ADMIN — ONDANSETRON 4 MG: 2 INJECTION INTRAMUSCULAR; INTRAVENOUS at 14:54

## 2021-04-02 RX ADMIN — Medication 100 MCG: at 14:25

## 2021-04-02 RX ADMIN — HYDROMORPHONE HYDROCHLORIDE 1 MG: 1 INJECTION, SOLUTION INTRAMUSCULAR; INTRAVENOUS; SUBCUTANEOUS at 12:42

## 2021-04-02 RX ADMIN — HYDROMORPHONE HYDROCHLORIDE 1 MG: 1 INJECTION, SOLUTION INTRAMUSCULAR; INTRAVENOUS; SUBCUTANEOUS at 22:28

## 2021-04-02 RX ADMIN — SODIUM CHLORIDE, SODIUM LACTATE, POTASSIUM CHLORIDE, AND CALCIUM CHLORIDE: 600; 310; 30; 20 INJECTION, SOLUTION INTRAVENOUS at 16:14

## 2021-04-02 RX ADMIN — HYDROMORPHONE HYDROCHLORIDE 1 MG: 1 INJECTION, SOLUTION INTRAMUSCULAR; INTRAVENOUS; SUBCUTANEOUS at 04:54

## 2021-04-02 RX ADMIN — Medication 10 MG: at 15:26

## 2021-04-02 RX ADMIN — MIDAZOLAM HYDROCHLORIDE 1 MG: 2 INJECTION, SOLUTION INTRAMUSCULAR; INTRAVENOUS at 15:10

## 2021-04-02 RX ADMIN — FUROSEMIDE 20 MG: 20 TABLET ORAL at 10:42

## 2021-04-02 RX ADMIN — HEPARIN SODIUM 500 UNITS/HR: 10000 INJECTION, SOLUTION INTRAVENOUS at 18:48

## 2021-04-02 RX ADMIN — ASPIRIN 81 MG CHEWABLE TABLET 81 MG: 81 TABLET CHEWABLE at 10:42

## 2021-04-02 RX ADMIN — METOPROLOL TARTRATE 25 MG: 25 TABLET, FILM COATED ORAL at 07:52

## 2021-04-02 RX ADMIN — HYDROMORPHONE HYDROCHLORIDE 1 MG: 1 INJECTION, SOLUTION INTRAMUSCULAR; INTRAVENOUS; SUBCUTANEOUS at 18:27

## 2021-04-02 RX ADMIN — GABAPENTIN 400 MG: 300 CAPSULE ORAL at 10:37

## 2021-04-02 RX ADMIN — CEFAZOLIN SODIUM 2 G: 1 INJECTION, POWDER, FOR SOLUTION INTRAMUSCULAR; INTRAVENOUS at 14:40

## 2021-04-02 RX ADMIN — MAGNESIUM SULFATE HEPTAHYDRATE 2 G: 40 INJECTION, SOLUTION INTRAVENOUS at 07:58

## 2021-04-02 RX ADMIN — LIDOCAINE HYDROCHLORIDE 80 MG: 20 INJECTION, SOLUTION EPIDURAL; INFILTRATION; INTRACAUDAL; PERINEURAL at 14:22

## 2021-04-02 NOTE — PROGRESS NOTES
TRANSFER - IN REPORT:    Verbal report received from Trent Yu RN(name) on Safia Sanchez  being received from CVT ICU(unit) for ordered procedure      Report consisted of patients Situation, Background, Assessment and   Recommendations(SBAR). Information from the following report(s) SBAR, Kardex, MAR, Recent Results and Pre Procedure Checklist was reviewed with the receiving nurse. Opportunity for questions and clarification was provided. Assessment completed upon patients arrival to unit and care assumed.

## 2021-04-02 NOTE — PROGRESS NOTES
Advance Care Planning     General Advance Care Planning (ACP) Conversation      Date of Conversation: 3/30/2021  Conducted with: Patient with Decision Making Capacity    Healthcare Decision Maker:     Primary Decision Maker: Amanda Burgess - [place] - [phone]  Click here to complete [address] including selection of the Healthcare Decision Maker Relationship (ie \"Primary\")  Today we documented Decision Maker(s) consistent with Legal Next of Kin hierarchy. Content/Action Overview:    Has ACP document(s) on file - reflects the patient's care preferences    Blas Vazquez RN - Outcomes Manager  [phone]

## 2021-04-02 NOTE — PROGRESS NOTES
conducted a Follow up consultation and Spiritual Assessment for Demarcus Vickers, who is a 59 y.o.,male. The  provided the following Interventions:  Continued the relationship of care and support. Listened empathetically. Patient shared that after his first amputation, he is able to accept whatever is next. Offered assurance of continued prayer on patient's behalf. Chart reviewed. The following outcomes were achieved:  Patient expressed gratitude for 's visit. Assessment:  There are no known further spiritual or [employer]ism issues which require Spiritual Care Services interventions at this time. Plan:  Chaplains will continue to follow and will provide pastoral care as needed or requested. ROEL ZamoraDiv.   Spiritual Care   [phone]

## 2021-04-02 NOTE — PROGRESS NOTES
Reason for Admission: Atherosclerosis of native artery of right lower extremity with intermittent claudication (HCC) [I70.211]  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HCC) [I70.201]                 RUR Score:    15%            Plan for utilizing home health:    Yes, freedom of choice signed but has no preference                      Likelihood of Readmission:   LOW                         Transition of Care Plan:              Initial assessment completed with patient. Cognitive status of patient: oriented to time, place, person and situation. Face sheet information confirmed:  yes. The patient designates daughterNghia to participate in his discharge plan and to receive any needed information. This patient lives in a single family home alone. Patient is not able to navigate steps as needed. Prior to hospitalization, patient was considered to be independent with ADLs/IADLS : yes . Patient has a current ACP document on file: no, completed at bedside. nd     Healthcare Decision Maker:     Click here to complete [address] including selection of the Healthcare Decision Maker Relationship (ie \"Primary\")    The daughter will be available to transport patient home upon discharge. The patient already has Walker, W/C, and shower chair available in the home. Patient is not currently active with home health. Patient has not stayed in a skilled nursing facility or rehab. This patient is on dialysis :no    List of available Home Health agencies were provided and reviewed with the patient prior to discharge. Freedom of choice signed: yes, for [facility] or whoever accepts his insurance. Currently, the discharge plan is Home with [address] [place] [address] Franklyn Samayoa. The patient states that he can obtain his medications from the pharmacy, and take his medications as directed. Patient's current insurance is [employer]       Care Management Interventions  PCP Verified by CM:  Yes  Mode of Transport at Discharge: Self  Transition of Care Consult (CM Consult): Discharge Planning, [address]: Yes  Current Support Network: Lives Alone  Confirm Follow Up Transport: Family  The Patient and/or Patient Representative was Provided with a Choice of Provider and Agrees with the Discharge Plan?: Yes  Freedom of Choice List was Provided with Basic Dialogue that Supports the Patient's Individualized Plan of Care/Goals, Treatment Preferences and Shares the Quality Data Associated with the Providers?: Yes  Discharge Location  Discharge Placement: Home with home health        Dania France RN - Outcomes Manager  [phone]

## 2021-04-02 NOTE — ANESTHESIA PREPROCEDURE EVALUATION
Relevant Problems   No relevant active problems       Anesthetic History   No history of anesthetic complications            Review of Systems / Medical History  Patient summary reviewed and pertinent labs reviewed    Pulmonary                Comments: Ex heavy smoker  Probable undiagnosed mild COPD   Neuro/Psych             Comments: neuropathy Cardiovascular          CHF: NYHA Classification II, dyspnea on exertion  Dysrhythmias : atrial fibrillation  CAD, PAD and hyperlipidemia    Exercise tolerance: <4 METS  Comments: EF 51% 2020  pulm htn  No pedal pulses RLE, for thrombectomy     GI/Hepatic/Renal  Within defined limits              Endo/Other      Hypothyroidism  Arthritis and anemia     Other Findings              Physical Exam    Airway  Mallampati: III  TM Distance: > 6 cm  Neck ROM: normal range of motion   Mouth opening: Normal    Comments: Full beard Cardiovascular  Regular rate and rhythm,  S1 and S2 normal,  no murmur, click, rub, or gallop             Dental    Dentition: Full lower dentures and Full upper dentures     Pulmonary      Decreased breath sounds: bilateral           Abdominal  GI exam deferred       Other Findings            Anesthetic Plan    ASA: 4  Anesthesia type: MAC and general - backup      Post-op pain plan if not by surgeon: IV PCA    Induction: Intravenous  Anesthetic plan and risks discussed with: Patient

## 2021-04-02 NOTE — PROGRESS NOTES
0800:  Report received, care assumed. Complete assessment performed. In bed. AAOx4. QUEEN x4. NSR. BP soft, stable, MAP >65, see flowsheets for details. D5 1/2 NS infusing 75cc/hr. Heparin 500un/hr infusing. Room air, lungs clear. Jiang draining adequate amounts clear yellow urine. Right groin surgical site Prevana wound closure device c/d/i. Right DP weak doppler present. PT remains absent, Dr Stoddard is aware. NPO for possible return to OR today. Pt aware and agreeable plan of care. HOB elevated. Full fall and aspiration precautions in effect. Call bell and phone at side. 1000:  Remains NPO. Dr Stoddard at bedside. Patient will return to OR later today per MD now. Patient aware and agreeable. Per MD, patient may have routine oral meds now, otherwise NPO. PO Cozaar will be held. /64 (79) now. 1410:  Report to anesthesia provider and Andrew Lentz RN. To [facility] OR via bed with monitor and staff. 1615:  TRANSFER - IN REPORT:  Verbal report received from BEAU Lombardo(name) on Wyvelma Loja  being received from [facility] OR(unit) for routine post - op    Report consisted of patients Situation, Background, Assessment and   Recommendations(SBAR). Information from the following report(s) OR Summary, MAR, Accordion and Cardiac Rhythm NSR. was reviewed with the receiving nurse. Opportunity for questions and clarification was provided. Assessment completed upon patients arrival to unit and care assumed. 1640:  Received from [facility] OR via bed into [facility] ICU room 2352 with RN, anesthesia provider and monitor. AAOx4. QUEEN x4. Denies pain or SOB. NSR. VSS. Oxygen mask intact, weaned to 2L/min NC. Lungs clear. HOB elevated. Right groin w/ Prevana changed out in OR, now remains c/d/i. Right DP Doppler present, PT intermittent present. Foot warmer and pinker than this morning assessment. Jiang draining clear yellow. New post op orders noted. Sonia in to speak with patient, states he will call pt's daughter for update. Medication clarification of resuming IV Heparin: although MAR has resume time 1700, MD states to resume at 500un/hr at 1900 this evening. Diet has been ordered. 1900:  Took dinner and fluids without difficulty. NSR. VSS. Pulses without change as verified with oncoming RN. Bedside and Verbal shift change report given to Roseanne (oncoming nurse) by Gabby Florian RN(offgoing nurse). Report included the following information SBAR, Kardex, OR Summary, Intake/Output, MAR, Accordion, Recent Results, Cardiac Rhythm NSR. and Alarm Parameters .

## 2021-04-02 NOTE — PROGRESS NOTES
1920 Pt received after shift change report from Tamia Jones RN. Pt up in bed. Recently medicated for pain. Still complaint of pain to right lower leg. Unable to obtain pulses via doppler. Red discoloration color noted. Doctor Davidson aware report. Will continue to monitor. 2210 Pt had 26 beats of NSVT while up in bed eating snacks. No associated syptoms    2232 EKG done with sinus rhythm noted. 2310 On call cardiology paged. Awaiting call back. 0000 Resting with no distress noted. Call bell in reach. 0200 Pt resting no distress    0400 Resting. Some signal noted to right lower leg via doppler. Red discoloration diminished. 0500 Awake. CHG bath performed. Bed linens, pads and gown changed. Pt repositioned self up in bed with no distress. Pain improved. Bedside shift change report given to Kate Padilla RN (oncoming nurse) by Diego Wagner RN (offgoing nurse).  Report included the following information Sbar, Mar, Kardex and Recent Results

## 2021-04-02 NOTE — PROGRESS NOTES
[facility]  -  Progress Note      Patient: Anu Olvera MRN: [id number]  SSN: [id number]    YOB: 1960  Age: 59 y.o. Sex: male      Admit Date: 3/31/2021    Assessment:     -Presented for angiography R leg due to R leg arterial occlusion - occluded R leg bypass graft. Hx peripheral vascular disease s/p left AKA. -S/p thrombectomy R axillofemoral bypass, thrombectomy R femorpopliteal bypass, revision R femoral artery with patch angioplasty by Dr. Nicole Both 4/1/2021  -[place] [facility] due to abnormal EKG  -Paroxysmal atrial fibrillation, historically on Coumadin for anticoagulation, s/p BERRY/cardioversion 4/18/2017  -Hx Nonischemic cardiomyopathy  -Echo 10/2017 with findings as follows: EF 55%, hypokinesis of basal inferior walls, normal LV wall thickness, normal diastolic function, mildly dilated RA, trivial mitral regurgitation  -CAD, cardiac cath 3/8/2017 with findings as follows:  · EF around 30%, no diagnostic segmental wall motion abnormality  · LM: Diffuse 30%  · LAD: Diffuse 30%  · Diagonal branches: Diffuse 30%  · Cx: Proximal 70%; trifurcation healed ruptured plaque with residual 90% (RYLEE 3)  · OM branches: Angiographically normal  · RCA: Diffuse 50% with focal mid 90%  -S/p successful stenting of Cx and RCA to 0% during subsequent cardiac cath 3/15/2017  -Nuclear stress test 11/2020 was low-risk, EF 51%  -Dyslipidemia     Previously followed by Dr. Dimitrios Bhatti, first appointment with Dr. Zaki Pemberton 4/27/2021    Plan:     -Telemetry reviewed, pt with 6 beats wide-complex tachycardia on telemetry around 22:00 last PM.  Labs at that time revealed Mg 1.6. Ordered 2 gm Mg IV this AM.  -Continue PO Lopressor, dose held last PM due to soft BP.  -Continue ASA 81 mg, Lipitor, Losartan. -No further recommendations from cardiac standpoint. Telemetry shows WCT, likely aberrancy, I do not suspect VT. Continue BB, replace Mg. Possible surgery later today.     I saw, examined, and evaluated the patient. I personally reviewed the patient's labs, tests, vitals, orders, medications, updated history, and other providers assessments. I personally agree with the findings as stated and the plan as documented. Maricruz Valenzuela MD      Subjective:     No new complaints.       Objective:      Patient Vitals for the past 8 hrs:   Pulse Resp BP SpO2   04/02/21 0700 87 -- 108/62 93 %   04/02/21 0600 (!) 114 29 131/81 96 %   04/02/21 0500 84 21 108/65 91 %   04/02/21 0400 76 25 124/63 98 %   04/02/21 0300 85 29 110/64 --   04/02/21 0200 88 30 122/66 --         Patient Vitals for the past 96 hrs:   Weight   04/02/21 0545 90.7 kg (200 lb)   04/01/21 0452 94.8 kg (208 lb 14.4 oz)   03/31/21 0926 92.1 kg (203 lb)         Intake/Output Summary (Last 24 hours) at 4/2/2021 2487  Last data filed at 4/2/2021 1713  Gross per 24 hour   Intake 3280.44 ml   Output 2770 ml   Net 510.44 ml       Physical Exam:  General:  alert, cooperative, no distress, appears stated age  Neck:  supple  Lungs:  clear to auscultation bilaterally  Heart:  Regular rate and rhythm  Abdomen:  abdomen is soft without significant tenderness, masses, organomegaly or guarding  Extremities:  L AKA, no significant RLE edema     Data Review:     Labs: Results:       Chemistry Recent Labs     04/02/21  0557 04/01/21  2310 03/31/21  2354   *  --  98     --  141   K 3.7 3.6 3.5     --  113*   CO2 23  --  24   BUN 9  --  8   CREA 0.81  --  0.63   CA 8.1*  --  8.1*   MG  --  1.6  --    AGAP 9  --  4   BUCR 11*  --  13      CBC w/Diff Recent Labs     04/02/21  0557 03/31/21  2354   WBC 17.5* 12.4   RBC 3.69* 4.46*   HGB 11.3* 14.0   HCT 33.4* 40.1    336   GRANS PENDING 55   LYMPH PENDING 35   EOS PENDING 0      Coagulation Recent Labs     04/01/21 2045 04/01/21  1904   APTT 52.4* 138.5*       Lipid Panel Lab Results   Component Value Date/Time    Cholesterol, total 105 09/08/2020 07:09 AM    HDL Cholesterol 26 (L) 09/08/2020 07:09 AM    LDL, calculated 60.4 09/08/2020 07:09 AM    VLDL, calculated 18.6 09/08/2020 07:09 AM    Triglyceride 93 09/08/2020 07:09 AM    CHOL/HDL Ratio 4.0 09/08/2020 07:09 AM      Thyroid Studies Lab Results   Component Value Date/Time    T4, Total 9.0 11/09/2017 10:28 AM    TSH 1.92 10/23/2018 10:05 AM

## 2021-04-02 NOTE — PROGRESS NOTES
Right leg pain resolved  Feels neuropathic calf and foot  Now has some doppler signal at foot  Plan for distal thrombectomy today

## 2021-04-02 NOTE — OP NOTES
Preoperative diagnosis: Tibial occlusive disease right leg, threatened limb loss    Postoperative diagnosis: Same    Procedures performed:  #1  Thrombectomy right leg femoropopliteal bypass and tibial runoff  #2  Right leg angiogram with interpretation  #3  Balloon angioplasty of popliteal artery tibial peroneal trunk and anterior tibial artery. #4  Catheter placement to anterior tibial artery anterior tibial artery angiogram with interpretation    Cultures: None    Specimens: None    Drains: None    Estimated blood loss: Less than 50 mL    Assistants: None    Implants: Please see above    Complications: None    Anesthesia: General anesthesia. Indications for the procedure: Nancy Morales is a 59 y.o. acute arterial occlusion right leg. Patient was given the appropriate risk and benefits of the procedure including but not limited to bleeding, infection, damage to adjacent structures, MI, stroke, death, loss of lower extremity, need for further surgery. Patient was understanding of all the risks and underwent a procedure. Operative findings:   #1  Bypass graft had partial thrombus, removed with thrombectomy. Right popliteal artery was patent tibial runoff had multiple occlusions to posterior tibial and anterior tib. Catheter placement to anterior tib. 2.5 x 200 balloon open direct inline flow from the bypass graft to the popliteal to the tibial vessels    Procedure:  Patient was correctly identified in the ICU to the OR in stable condition. Patient had pre-incision timeout prior to any incision. Patient was prepped and draped in the normal sterile fashion according to CDC guidelines aseptic technique. Open the prior incision on the right groin and irrigated wash the existing bypass graft. It had a palpable pulse punctured the graft but there was some degree of thrombus here so I opened the graft under control after heparinizing with 5000.   Thrombectomized the body of the graft after an angiogram was done there was a still about a 50% layer of thrombus in the graft that all came out surgically with thrombectomy. Placed the catheter down to the popliteal artery into the tibial angiogram there is a proximal 15 cm of total occlusion of the anterior tibial, proximal occlusion of the posterior tibial.  Cross this with a V 18 wire placed the catheter to the anterior tibial distally performed angiogram was intraluminal.  Balloon angioplasty of the anterior tibial tibial peroneal trunk popliteal into the graft with a 2.5 x 200 balloon. Angiogram after this shows patent runoff to the anterior tibial and posterior tibial.  I also given TPA directly in this area via the directional catheter at the popliteal artery. Foot immediately became pink it was ischemic appearing before. Pulled the sheath repaired the site with Prolene sutures. Irrigated with antibiotic irrigation closed the femoral fascia. 2-0 Monocryl for this. 3-0 Monocryl for subcu. 4 Monocryl and Dermabond glue for skin. Applied the wound management system. Patient had antibiotics for the procedure. Is transferred to ICU with a viable limb.

## 2021-04-02 NOTE — ANESTHESIA POSTPROCEDURE EVALUATION
Procedure(s):  RIGHT LEG THROMBECTOMY. general - backup    Anesthesia Post Evaluation      Multimodal analgesia: multimodal analgesia used between 6 hours prior to anesthesia start to PACU discharge  Patient location during evaluation: bedside  Patient participation: complete - patient participated  Level of consciousness: awake  Pain score: 2  Pain management: adequate  Airway patency: patent  Anesthetic complications: no  Cardiovascular status: stable  Respiratory status: acceptable  Hydration status: acceptable  Post anesthesia nausea and vomiting:  controlled  Final Post Anesthesia Temperature Assessment:  Normothermia (36.0-37.5 degrees C)      INITIAL Post-op Vital signs:   Vitals Value Taken Time   /68 04/02/21 1900   Temp 36.9 °C (98.5 °F) 04/02/21 1638   Pulse 77 04/02/21 1909   Resp 19 04/02/21 1909   SpO2 93 % 04/02/21 1909   Vitals shown include unvalidated device data.

## 2021-04-03 LAB
ANION GAP SERPL CALC-SCNC: 6 MMOL/L (ref 3–18)
BASOPHILS # BLD: 0 K/UL (ref 0–0.06)
BASOPHILS NFR BLD: 0 % (ref 0–3)
BUN SERPL-MCNC: 9 MG/DL (ref 7–18)
BUN/CREAT SERPL: 12 (ref 12–20)
CALCIUM SERPL-MCNC: 7.6 MG/DL (ref 8.5–10.1)
CHLORIDE SERPL-SCNC: 106 MMOL/L (ref 100–111)
CO2 SERPL-SCNC: 26 MMOL/L (ref 21–32)
CREAT SERPL-MCNC: 0.73 MG/DL (ref 0.6–1.3)
DIFFERENTIAL METHOD BLD: ABNORMAL
EOSINOPHIL # BLD: 0 K/UL (ref 0–0.4)
EOSINOPHIL NFR BLD: 0 % (ref 0–5)
ERYTHROCYTE [DISTWIDTH] IN BLOOD BY AUTOMATED COUNT: 14.8 % (ref 11.6–14.5)
GLUCOSE SERPL-MCNC: 97 MG/DL (ref 74–99)
HCT VFR BLD AUTO: 28.7 % (ref 36–48)
HGB BLD-MCNC: 9.7 G/DL (ref 13–16)
LYMPHOCYTES # BLD: 2.6 K/UL (ref 0.8–3.5)
LYMPHOCYTES NFR BLD: 17 % (ref 20–51)
MCH RBC QN AUTO: 31 PG (ref 24–34)
MCHC RBC AUTO-ENTMCNC: 33.8 G/DL (ref 31–37)
MCV RBC AUTO: 91.7 FL (ref 74–97)
MONOCYTES # BLD: 1.4 K/UL (ref 0–1)
MONOCYTES NFR BLD: 9 % (ref 2–9)
NEUTS SEG # BLD: 11.4 K/UL (ref 1.8–8)
NEUTS SEG NFR BLD: 74 % (ref 42–75)
PLATELET # BLD AUTO: 272 K/UL (ref 135–420)
PLATELET COMMENTS,PCOM: ABNORMAL
PMV BLD AUTO: 9.2 FL (ref 9.2–11.8)
POTASSIUM SERPL-SCNC: 3.4 MMOL/L (ref 3.5–5.5)
RBC # BLD AUTO: 3.13 M/UL (ref 4.7–5.5)
RBC MORPH BLD: ABNORMAL
SODIUM SERPL-SCNC: 138 MMOL/L (ref 136–145)
WBC # BLD AUTO: 15.4 K/UL (ref 4.6–13.2)

## 2021-04-03 PROCEDURE — 74011250637 HC RX REV CODE- 250/637: Performed by: SURGERY

## 2021-04-03 PROCEDURE — 80048 BASIC METABOLIC PNL TOTAL CA: CPT

## 2021-04-03 PROCEDURE — 65660000004 HC RM CVT STEPDOWN

## 2021-04-03 PROCEDURE — 85025 COMPLETE CBC W/AUTO DIFF WBC: CPT

## 2021-04-03 PROCEDURE — 36415 COLL VENOUS BLD VENIPUNCTURE: CPT

## 2021-04-03 PROCEDURE — 99233 SBSQ HOSP IP/OBS HIGH 50: CPT | Performed by: INTERNAL MEDICINE

## 2021-04-03 PROCEDURE — 74011250636 HC RX REV CODE- 250/636: Performed by: SURGERY

## 2021-04-03 RX ORDER — POTASSIUM CHLORIDE 20 MEQ/1
20 TABLET, EXTENDED RELEASE ORAL
Status: COMPLETED | OUTPATIENT
Start: 2021-04-03 | End: 2021-04-03

## 2021-04-03 RX ADMIN — METOPROLOL TARTRATE 25 MG: 25 TABLET, FILM COATED ORAL at 17:41

## 2021-04-03 RX ADMIN — Medication 10 ML: at 14:37

## 2021-04-03 RX ADMIN — FUROSEMIDE 20 MG: 20 TABLET ORAL at 08:14

## 2021-04-03 RX ADMIN — HYDROMORPHONE HYDROCHLORIDE 1 MG: 1 INJECTION, SOLUTION INTRAMUSCULAR; INTRAVENOUS; SUBCUTANEOUS at 04:33

## 2021-04-03 RX ADMIN — Medication 10 ML: at 06:18

## 2021-04-03 RX ADMIN — GABAPENTIN 400 MG: 300 CAPSULE ORAL at 01:10

## 2021-04-03 RX ADMIN — GABAPENTIN 400 MG: 300 CAPSULE ORAL at 08:14

## 2021-04-03 RX ADMIN — HYDROMORPHONE HYDROCHLORIDE 1 MG: 1 INJECTION, SOLUTION INTRAMUSCULAR; INTRAVENOUS; SUBCUTANEOUS at 20:00

## 2021-04-03 RX ADMIN — ATORVASTATIN CALCIUM 80 MG: 40 TABLET, FILM COATED ORAL at 21:28

## 2021-04-03 RX ADMIN — GABAPENTIN 400 MG: 300 CAPSULE ORAL at 15:05

## 2021-04-03 RX ADMIN — HYDROMORPHONE HYDROCHLORIDE 1 MG: 1 INJECTION, SOLUTION INTRAMUSCULAR; INTRAVENOUS; SUBCUTANEOUS at 11:07

## 2021-04-03 RX ADMIN — GABAPENTIN 400 MG: 300 CAPSULE ORAL at 21:28

## 2021-04-03 RX ADMIN — Medication 10 ML: at 21:29

## 2021-04-03 RX ADMIN — ATORVASTATIN CALCIUM 80 MG: 40 TABLET, FILM COATED ORAL at 01:10

## 2021-04-03 RX ADMIN — THERA TABS 1 TABLET: TAB at 08:14

## 2021-04-03 RX ADMIN — ASPIRIN 81 MG CHEWABLE TABLET 81 MG: 81 TABLET CHEWABLE at 08:13

## 2021-04-03 RX ADMIN — HYDROMORPHONE HYDROCHLORIDE 1 MG: 1 INJECTION, SOLUTION INTRAMUSCULAR; INTRAVENOUS; SUBCUTANEOUS at 15:05

## 2021-04-03 RX ADMIN — HYDROMORPHONE HYDROCHLORIDE 1 MG: 1 INJECTION, SOLUTION INTRAMUSCULAR; INTRAVENOUS; SUBCUTANEOUS at 22:56

## 2021-04-03 RX ADMIN — Medication 10 ML: at 01:11

## 2021-04-03 RX ADMIN — POTASSIUM CHLORIDE 20 MEQ: 1500 TABLET, EXTENDED RELEASE ORAL at 11:05

## 2021-04-03 RX ADMIN — OXYCODONE HYDROCHLORIDE AND ACETAMINOPHEN 2 TABLET: 5; 325 TABLET ORAL at 14:37

## 2021-04-03 RX ADMIN — OXYCODONE HYDROCHLORIDE AND ACETAMINOPHEN 1 TABLET: 5; 325 TABLET ORAL at 08:18

## 2021-04-03 RX ADMIN — OXYCODONE HYDROCHLORIDE AND ACETAMINOPHEN 2 TABLET: 5; 325 TABLET ORAL at 21:34

## 2021-04-03 RX ADMIN — OXYCODONE HYDROCHLORIDE AND ACETAMINOPHEN 2 TABLET: 5; 325 TABLET ORAL at 01:09

## 2021-04-03 RX ADMIN — METOPROLOL TARTRATE 25 MG: 25 TABLET, FILM COATED ORAL at 08:12

## 2021-04-03 NOTE — PROGRESS NOTES
Problem: Falls - Risk of  Goal: *Absence of Falls  Description: Document Peña Roca Fall Risk and appropriate interventions in the flowsheet. Outcome: Progressing Towards Goal  Note: Fall Risk Interventions:  Mobility Interventions: Bed/chair exit alarm, Communicate number of staff needed for ambulation/transfer, Patient to call before getting OOB         Medication Interventions: Bed/chair exit alarm, Evaluate medications/consider consulting pharmacy, Patient to call before getting OOB    Elimination Interventions: Call light in reach, Patient to call for help with toileting needs, Toileting schedule/hourly rounds              Problem: Patient Education: Go to Patient Education Activity  Goal: Patient/Family Education  Outcome: Progressing Towards Goal     Problem: Pressure Injury - Risk of  Goal: *Prevention of pressure injury  Description: Document Efrem Scale and appropriate interventions in the flowsheet. Outcome: Progressing Towards Goal  Note: Pressure Injury Interventions:  Sensory Interventions: Assess changes in LOC, Pressure redistribution bed/mattress (bed type), Turn and reposition approx. every two hours (pillows and wedges if needed)         Activity Interventions: PT/OT evaluation, Pressure redistribution bed/mattress(bed type)    Mobility Interventions: Pressure redistribution bed/mattress (bed type), PT/OT evaluation, Turn and reposition approx.  every two hours(pillow and wedges)    Nutrition Interventions: Document food/fluid/supplement intake                     Problem: Patient Education: Go to Patient Education Activity  Goal: Patient/Family Education  Outcome: Progressing Towards Goal

## 2021-04-03 NOTE — PROGRESS NOTES
1900 - TRANSFER - IN REPORT:    Verbal report received from Ashwin Leslie, [facility] (name) on Lucy Pompa  being received from cvt icu (unit) for routine progression of care      Report consisted of patients Situation, Background, Assessment and   Recommendations(SBAR). Information from the following report(s) SBAR, Kardex, Procedure Summary, Intake/Output, Recent Results and Cardiac Rhythm NSR was reviewed with the receiving nurse. Opportunity for questions and clarification was provided. Assessment completed upon patients arrival to unit and care assumed. 1909 - Bedside shift change report given to German Zimmerman RN (oncoming nurse) by Meño Ca RN (offgoing nurse). Report included the following information SBAR, Kardex, Intake/Output, MAR, Recent Results and Cardiac Rhythm NSR.

## 2021-04-03 NOTE — PROGRESS NOTES
TRANSFER - OUT REPORT:    Verbal report given to Eric Johnson RN(name) on Janine Stands  being transferred to [facility](unit) for routine progression of care       Report consisted of patients Situation, Background, Assessment and   Recommendations(SBAR). Information from the following report(s) SBAR, Kardex, Procedure Summary, Intake/Output, MAR, Recent Results and Med Rec Status was reviewed with the receiving nurse. Lines:   Peripheral IV 03/31/21 Left Hand (Active)   Site Assessment Clean, dry, & intact 04/03/21 1600   Phlebitis Assessment 0 04/03/21 1600   Infiltration Assessment 0 04/03/21 1600   Dressing Status Clean, dry, & intact 04/03/21 1600   Dressing Type Transparent 04/03/21 1600   Hub Color/Line Status Pink 04/03/21 1600   Action Taken Open ports on tubing capped 04/03/21 1600   Alcohol Cap Used Yes 04/03/21 1600       Peripheral IV 04/01/21 Right Arm (Active)   Site Assessment Clean, dry, & intact 04/03/21 1600   Phlebitis Assessment 0 04/03/21 1600   Infiltration Assessment 0 04/03/21 1600   Dressing Status Clean, dry, & intact 04/03/21 1600   Dressing Type Transparent;Tape 04/03/21 1600   Hub Color/Line Status Blue 04/03/21 1600   Action Taken Open ports on tubing capped 04/03/21 1600   Alcohol Cap Used Yes 04/03/21 1600        Opportunity for questions and clarification was provided.       Patient transported with:   Registered Nurse

## 2021-04-03 NOTE — PROGRESS NOTES
[facility]  -  Progress Note      Patient: Toño Smyth MRN: [id number]  SSN: [id number]    YOB: 1960  Age: 59 y.o. Sex: male      Admit Date: 3/31/2021    Assessment:     -Presented for angiography R leg due to R leg arterial occlusion - occluded R leg bypass graft.  Hx peripheral vascular disease s/p left AKA. -S/p thrombectomy R axillofemoral bypass, thrombectomy R femorpopliteal bypass, revision R femoral artery with patch angioplasty by Dr. Mame Gillespie 4/1/2021  -S/p thrombectomy R leg femoropopliteal bypass and tibial runoff by Dr. Mame Gillespie 4/2/2021.  -Deshawn Addison due to abnormal EKG  -Paroxysmal atrial fibrillation, historically on Coumadin for anticoagulation, s/p BERRY/cardioversion 4/18/2017  -Hx Nonischemic cardiomyopathy  -Echo 10/2017 with findings as follows: EF 55%, hypokinesis of basal inferior walls, normal LV wall thickness, normal diastolic function, mildly dilated RA, trivial mitral regurgitation  -CAD, cardiac cath 3/8/2017 with findings as follows:  · EF around 30%, no diagnostic segmental wall motion abnormality  · LM: Diffuse 30%  · LAD: Diffuse 30%  · Diagonal branches: Diffuse 30%  · Cx: Proximal 70%; trifurcation healed ruptured plaque with residual 90% (RYLEE 3)  · OM branches: Angiographically normal  · RCA: Diffuse 50% with focal mid 90%  -S/p successful stenting of Cx and RCA to 0% during subsequent cardiac cath 3/15/2017  -Nuclear stress test 11/2020 was low-risk, EF 51%  -Dyslipidemia     Previously followed by Dr. George Kanner, first appointment with Dr. Sudhakar Cano 4/27/2021    Plan:     Rhythm stable overnight, no events. Given his recent event requiring thrombectomy along with history of remote A.fib, as well as short runs of suspected A.tach w/intermittent aberrancy, I would have low threshold for Coumadin but would defer final decision to his primary cardiology, now Dr. Sudhakar Cano as well as Dr. Mame Gillespie.     Continued on ASA, Lipitor, Lopressor, Losartan, PO Lasix    Subjective:     No new complaints. Feels good.     Objective:      Patient Vitals for the past 8 hrs:   Temp Pulse Resp BP SpO2   04/03/21 0800 100.4 °F (38 °C) (!) 114 24 104/81 91 %   04/03/21 0700 -- (!) 104 27 111/72 100 %   04/03/21 0630 -- 93 17 -- 90 %   04/03/21 0600 -- (!) 109 27 (!) 136/98 94 %   04/03/21 0530 -- 93 25 -- 91 %   04/03/21 0500 -- 98 15 102/63 90 %   04/03/21 0400 98.4 °F (36.9 °C) 99 20 115/75 96 %   04/03/21 0300 -- 94 15 111/73 95 %   04/03/21 0200 -- 99 20 115/66 93 %         Patient Vitals for the past 96 hrs:   Weight   04/03/21 0619 94.3 kg (208 lb)   04/02/21 1047 94.3 kg (208 lb)   04/02/21 0545 90.7 kg (200 lb)   04/01/21 0452 94.8 kg (208 lb 14.4 oz)   03/31/21 0926 92.1 kg (203 lb)         Intake/Output Summary (Last 24 hours) at 4/3/2021 0907  Last data filed at 4/3/2021 0800  Gross per 24 hour   Intake 1812.5 ml   Output 1270 ml   Net 542.5 ml       Physical Exam:  General:  alert, cooperative, no distress, appears stated age  Neck:  supple  Lungs:  clear to auscultation bilaterally  Heart:  Regular rate and rhythm  Abdomen:  abdomen is soft without significant tenderness, masses, organomegaly or guarding  Extremities:  L AKA    Data Review:     Labs: Results:       Chemistry Recent Labs     04/03/21  0550 04/02/21  0557 04/01/21  2310 03/31/21  2354   GLU 97 135*  --  98    140  --  141   K 3.4* 3.7 3.6 3.5    108  --  113*   CO2 26 23  --  24   BUN 9 9  --  8   CREA 0.73 0.81  --  0.63   CA 7.6* 8.1*  --  8.1*   MG  --   --  1.6  --    AGAP 6 9  --  4   BUCR 12 11*  --  13      CBC w/Diff Recent Labs     04/03/21  0550 04/02/21  0557 03/31/21  2354   WBC 15.4* 17.5* 12.4   RBC 3.13* 3.69* 4.46*   HGB 9.7* 11.3* 14.0   HCT 28.7* 33.4* 40.1    312 336   GRANS PENDING 84* 55   LYMPH PENDING 14* 35   EOS PENDING 0 0      Coagulation Recent Labs     04/01/21  2045 04/01/21  1904   APTT 52.4* 138.5*       Lipid Panel Lab Results   Component Value Date/Time    Cholesterol, total 105 09/08/2020 07:09 AM    HDL Cholesterol 26 (L) 09/08/2020 07:09 AM    LDL, calculated 60.4 09/08/2020 07:09 AM    VLDL, calculated 18.6 09/08/2020 07:09 AM    Triglyceride 93 09/08/2020 07:09 AM    CHOL/HDL Ratio 4.0 09/08/2020 07:09 AM      Thyroid Studies Lab Results   Component Value Date/Time    T4, Total 9.0 11/09/2017 10:28 AM    TSH 1.92 10/23/2018 10:05 AM

## 2021-04-03 NOTE — PROGRESS NOTES
Uneventful night. Effective pain management alternating percocet and dilaudid. . Denies need for pain medication this AM. Morning care performed. Pt tolerates well. Bedside shift change report given to Tasha Ramirez RN (oncoming nurse) by Rashid Osorio RN (offgoing nurse).  Report included the following information SBAR, MAR, KARDEX AND RECENT RESULTS

## 2021-04-04 ENCOUNTER — APPOINTMENT (OUTPATIENT)
Dept: GENERAL RADIOLOGY | Age: 61
DRG: 253 | End: 2021-04-04
Attending: SURGERY
Payer: COMMERCIAL

## 2021-04-04 LAB
APTT PPP: 44.9 SEC (ref 23–36.4)
APTT PPP: 70.1 SEC (ref 23–36.4)
ERYTHROCYTE [DISTWIDTH] IN BLOOD BY AUTOMATED COUNT: 14.4 % (ref 11.6–14.5)
HCT VFR BLD AUTO: 27.5 % (ref 36–48)
HGB BLD-MCNC: 9.3 G/DL (ref 13–16)
MCH RBC QN AUTO: 30.9 PG (ref 24–34)
MCHC RBC AUTO-ENTMCNC: 33.8 G/DL (ref 31–37)
MCV RBC AUTO: 91.4 FL (ref 74–97)
PLATELET # BLD AUTO: 270 K/UL (ref 135–420)
PMV BLD AUTO: 9.4 FL (ref 9.2–11.8)
RBC # BLD AUTO: 3.01 M/UL (ref 4.7–5.5)
WBC # BLD AUTO: 14.3 K/UL (ref 4.6–13.2)

## 2021-04-04 PROCEDURE — 65660000004 HC RM CVT STEPDOWN

## 2021-04-04 PROCEDURE — 87040 BLOOD CULTURE FOR BACTERIA: CPT

## 2021-04-04 PROCEDURE — 71046 X-RAY EXAM CHEST 2 VIEWS: CPT

## 2021-04-04 PROCEDURE — 87086 URINE CULTURE/COLONY COUNT: CPT

## 2021-04-04 PROCEDURE — 36415 COLL VENOUS BLD VENIPUNCTURE: CPT

## 2021-04-04 PROCEDURE — 85730 THROMBOPLASTIN TIME PARTIAL: CPT

## 2021-04-04 PROCEDURE — 99233 SBSQ HOSP IP/OBS HIGH 50: CPT | Performed by: INTERNAL MEDICINE

## 2021-04-04 PROCEDURE — 74011250636 HC RX REV CODE- 250/636: Performed by: SURGERY

## 2021-04-04 PROCEDURE — 85027 COMPLETE CBC AUTOMATED: CPT

## 2021-04-04 PROCEDURE — 74011250637 HC RX REV CODE- 250/637: Performed by: SURGERY

## 2021-04-04 RX ORDER — HEPARIN SODIUM 10000 [USP'U]/100ML
12-25 INJECTION, SOLUTION INTRAVENOUS
Status: DISCONTINUED | OUTPATIENT
Start: 2021-04-04 | End: 2021-04-08

## 2021-04-04 RX ADMIN — ATORVASTATIN CALCIUM 80 MG: 40 TABLET, FILM COATED ORAL at 21:54

## 2021-04-04 RX ADMIN — HYDROMORPHONE HYDROCHLORIDE 1 MG: 1 INJECTION, SOLUTION INTRAMUSCULAR; INTRAVENOUS; SUBCUTANEOUS at 21:53

## 2021-04-04 RX ADMIN — HYDROMORPHONE HYDROCHLORIDE 0.5 MG: 1 INJECTION, SOLUTION INTRAMUSCULAR; INTRAVENOUS; SUBCUTANEOUS at 09:37

## 2021-04-04 RX ADMIN — GABAPENTIN 400 MG: 300 CAPSULE ORAL at 09:31

## 2021-04-04 RX ADMIN — ASPIRIN 81 MG CHEWABLE TABLET 81 MG: 81 TABLET CHEWABLE at 09:31

## 2021-04-04 RX ADMIN — OXYCODONE HYDROCHLORIDE AND ACETAMINOPHEN 2 TABLET: 5; 325 TABLET ORAL at 20:40

## 2021-04-04 RX ADMIN — LOSARTAN POTASSIUM 100 MG: 50 TABLET, FILM COATED ORAL at 09:32

## 2021-04-04 RX ADMIN — OXYCODONE HYDROCHLORIDE AND ACETAMINOPHEN 2 TABLET: 5; 325 TABLET ORAL at 16:44

## 2021-04-04 RX ADMIN — FUROSEMIDE 20 MG: 20 TABLET ORAL at 09:31

## 2021-04-04 RX ADMIN — HYDROMORPHONE HYDROCHLORIDE 1 MG: 1 INJECTION, SOLUTION INTRAMUSCULAR; INTRAVENOUS; SUBCUTANEOUS at 01:39

## 2021-04-04 RX ADMIN — GABAPENTIN 400 MG: 300 CAPSULE ORAL at 21:54

## 2021-04-04 RX ADMIN — HYDROMORPHONE HYDROCHLORIDE 1 MG: 1 INJECTION, SOLUTION INTRAMUSCULAR; INTRAVENOUS; SUBCUTANEOUS at 11:23

## 2021-04-04 RX ADMIN — Medication 10 ML: at 22:00

## 2021-04-04 RX ADMIN — METOPROLOL TARTRATE 25 MG: 25 TABLET, FILM COATED ORAL at 18:53

## 2021-04-04 RX ADMIN — OXYCODONE HYDROCHLORIDE AND ACETAMINOPHEN 2 TABLET: 5; 325 TABLET ORAL at 13:23

## 2021-04-04 RX ADMIN — HYDROMORPHONE HYDROCHLORIDE 1 MG: 1 INJECTION, SOLUTION INTRAMUSCULAR; INTRAVENOUS; SUBCUTANEOUS at 18:53

## 2021-04-04 RX ADMIN — METOPROLOL TARTRATE 25 MG: 25 TABLET, FILM COATED ORAL at 09:31

## 2021-04-04 RX ADMIN — HYDROMORPHONE HYDROCHLORIDE 1 MG: 1 INJECTION, SOLUTION INTRAMUSCULAR; INTRAVENOUS; SUBCUTANEOUS at 04:20

## 2021-04-04 RX ADMIN — THERA TABS 1 TABLET: TAB at 09:31

## 2021-04-04 RX ADMIN — HYDROMORPHONE HYDROCHLORIDE 1 MG: 1 INJECTION, SOLUTION INTRAMUSCULAR; INTRAVENOUS; SUBCUTANEOUS at 14:46

## 2021-04-04 RX ADMIN — HEPARIN SODIUM 500 UNITS/HR: 10000 INJECTION, SOLUTION INTRAVENOUS at 02:33

## 2021-04-04 RX ADMIN — HYDROMORPHONE HYDROCHLORIDE 1 MG: 1 INJECTION, SOLUTION INTRAMUSCULAR; INTRAVENOUS; SUBCUTANEOUS at 08:03

## 2021-04-04 RX ADMIN — OXYCODONE HYDROCHLORIDE AND ACETAMINOPHEN 2 TABLET: 5; 325 TABLET ORAL at 10:39

## 2021-04-04 RX ADMIN — Medication 10 ML: at 13:24

## 2021-04-04 RX ADMIN — GABAPENTIN 400 MG: 300 CAPSULE ORAL at 16:44

## 2021-04-04 RX ADMIN — Medication 10 ML: at 09:42

## 2021-04-04 NOTE — PROGRESS NOTES
Cardiology input appreciated and agree with anticoagulation  We will change to heparin protocol today  Has swelling at the pretibial area and foot, will get DVT study.   Likely reperfusion edema  Elevated white count noted, will get urine culture blood culture and chest x-ray  Pulses intact

## 2021-04-04 NOTE — ROUTINE PROCESS
Bedside and Verbal shift change report given to Wilfredo Turk RN (oncoming nurse) by Queenie Hernández RN (offgoing nurse). Report included the following information SBAR, Kardex, Intake/Output, MAR, Recent Results and Cardiac Rhythm NSR, sinus tach.

## 2021-04-04 NOTE — PROGRESS NOTES
[facility] - Progress Note  Admit Date: 3/31/2021    Assessment:     -Presented for angiography R leg due to R leg arterial occlusion - occluded R leg bypass graft.  Hx peripheral vascular disease s/p left AKA. -S/p thrombectomy R axillofemoral bypass, thrombectomy R femorpopliteal bypass, revision R femoral artery with patch angioplasty by Dr. Kristin Gilbert 4/1/2021  -S/p thrombectomy R leg femoropopliteal bypass and tibial runoff by Dr. Kristin Gilbert 4/2/2021.  -Consulted due to abnormal EKG, preop  -Paroxysmal atrial fibrillation, historically on Coumadin for anticoagulation, s/p BERRY/cardioversion 4/18/2017  -Hx Nonischemic cardiomyopathy  -Echo 10/2017 with findings as follows: EF 55%, hypokinesis of basal inferior walls, normal LV wall thickness, normal diastolic function, mildly dilated RA, trivial mitral regurgitation  -CAD, cardiac cath 3/8/2017 with findings as follows:  · EF around 30%, no diagnostic segmental wall motion abnormality  · LM: Diffuse 30%  · LAD: Diffuse 30%  · Diagonal branches: Diffuse 30%  · Cx: Proximal 70%; trifurcation healed ruptured plaque with residual 90% (RYLEE 3)  · OM branches: Angiographically normal  · RCA: Diffuse 50% with focal mid 90%  -S/p successful stenting of Cx and RCA to 0% during subsequent cardiac cath 3/15/2017  -Nuclear stress test 11/2020 was low-risk, EF 51%  -Dyslipidemia     Previously followed by Dr. Tremayne Alfredo, first appointment with Dr. Buckner Render 4/27/2021     Plan:     Vascular to see today due to increasing leg pain/edema. Currently on heparin drip. High suspicion for possible embolic event with superimposed PAD. Consider antiplatelet + coumadin or NOAC at discharge given previous history of A.fib and now A.tach. Subjective:     C/o increasing right leg pain/edema. Tele with sinus and short runs A.tach.     Objective:      Patient Vitals for the past 8 hrs:   Temp Pulse Resp BP SpO2   04/04/21 0821 98.8 °F (37.1 °C) (!) 106 18 132/74 95 % 04/04/21 0804 97.8 °F (36.6 °C) (!) 106 18 (!) 154/76 99 %   04/04/21 0802 -- -- -- (!) 151/68 --   04/04/21 0427 98.2 °F (36.8 °C) (!) 112 18 134/76 99 %         Patient Vitals for the past 96 hrs:   Weight   04/04/21 0427 89.7 kg (197 lb 12.8 oz)   04/03/21 0619 94.3 kg (208 lb)   04/02/21 1047 94.3 kg (208 lb)   04/02/21 0545 90.7 kg (200 lb)   04/01/21 0452 94.8 kg (208 lb 14.4 oz)   03/31/21 0926 92.1 kg (203 lb)                    Intake/Output Summary (Last 24 hours) at 4/4/2021 0910  Last data filed at 4/4/2021 0347  Gross per 24 hour   Intake 480 ml   Output 1275 ml   Net -795 ml       Physical Exam:  General:  alert, cooperative, no distress, appears stated age  Neck:  nontender  Lungs:  clear to auscultation bilaterally  Heart:  regular rate and rhythm, S1, S2 normal, no murmur, click, rub or gallop  Abdomen:  abdomen is soft without significant tenderness, masses, organomegaly or guarding  Extremities:  extremities normal, atraumatic, no cyanosis or edema    Data Review:     Labs: Results:       Chemistry Recent Labs     04/03/21  0550 04/02/21  0557 04/01/21  2310   GLU 97 135*  --     140  --    K 3.4* 3.7 3.6    108  --    CO2 26 23  --    BUN 9 9  --    CREA 0.73 0.81  --    CA 7.6* 8.1*  --    MG  --   --  1.6   AGAP 6 9  --    BUCR 12 11*  --       CBC w/Diff Recent Labs     04/04/21  0520 04/03/21  0550 04/02/21  0557   WBC 14.3* 15.4* 17.5*   RBC 3.01* 3.13* 3.69*   HGB 9.3* 9.7* 11.3*   HCT 27.5* 28.7* 33.4*    272 312   GRANS  --  74 84*   LYMPH  --  17* 14*   EOS  --  0 0      Cardiac Enzymes No results found for: CPK, CK, CKMMB, CKMB, RCK3, CKMBT, CKNDX, CKND1, LEO, TROPT, TROIQ, DEWAYNE, TROPT, TNIPOC, BNP, BNPP   Coagulation Recent Labs     04/01/21 2045 04/01/21  1904   APTT 52.4* 138.5*       Lipid Panel Lab Results   Component Value Date/Time    Cholesterol, total 105 09/08/2020 07:09 AM    HDL Cholesterol 26 (L) 09/08/2020 07:09 AM    LDL, calculated 60.4 09/08/2020 07:09 AM    VLDL, calculated 18.6 09/08/2020 07:09 AM    Triglyceride 93 09/08/2020 07:09 AM    CHOL/HDL Ratio 4.0 09/08/2020 07:09 AM      BNP No results found for: BNP, BNPP, XBNPT   Liver Enzymes No results for input(s): TP, ALB, TBIL, AP in the last 72 hours.     No lab exists for component: SGOT, GPT, DBIL   Digoxin    Thyroid Studies Lab Results   Component Value Date/Time    T4, Total 9.0 11/09/2017 10:28 AM    TSH 1.92 10/23/2018 10:05 AM          Signed By: Angelica Vines MD     April 4, 2021

## 2021-04-04 NOTE — ROUTINE PROCESS
Bedside shift change report given to An Martinez RN (oncoming nurse) by Stefan Bill RN (offgoing nurse). Report included the following information SBAR, Kardex, Procedure Summary, Intake/Output, MAR, Recent Results and Cardiac Rhythm NSR.

## 2021-04-04 NOTE — PROGRESS NOTES
Problem: Falls - Risk of  Goal: *Absence of Falls  Description: Document Devere Creston Fall Risk and appropriate interventions in the flowsheet. Outcome: Progressing Towards Goal  Note: Fall Risk Interventions:  Mobility Interventions: Patient to call before getting OOB         Medication Interventions: Patient to call before getting OOB    Elimination Interventions: Call light in reach              Problem: Patient Education: Go to Patient Education Activity  Goal: Patient/Family Education  Outcome: Progressing Towards Goal     Problem: Pressure Injury - Risk of  Goal: *Prevention of pressure injury  Description: Document Efrem Scale and appropriate interventions in the flowsheet.   Outcome: Progressing Towards Goal  Note: Pressure Injury Interventions:  Sensory Interventions: Keep linens dry and wrinkle-free, Minimize linen layers, Pressure redistribution bed/mattress (bed type)         Activity Interventions: Pressure redistribution bed/mattress(bed type)    Mobility Interventions: Pressure redistribution bed/mattress (bed type)    Nutrition Interventions: Document food/fluid/supplement intake                     Problem: Patient Education: Go to Patient Education Activity  Goal: Patient/Family Education  Outcome: Progressing Towards Goal

## 2021-04-05 ENCOUNTER — APPOINTMENT (OUTPATIENT)
Dept: VASCULAR SURGERY | Age: 61
DRG: 253 | End: 2021-04-05
Attending: SURGERY
Payer: COMMERCIAL

## 2021-04-05 LAB
APTT PPP: 97.4 SEC (ref 23–36.4)
ATRIAL RATE: 84 BPM
BACTERIA SPEC CULT: NORMAL
BASOPHILS # BLD: 0 K/UL (ref 0–0.06)
BASOPHILS NFR BLD: 0 % (ref 0–3)
CALCULATED P AXIS, ECG09: 52 DEGREES
CALCULATED R AXIS, ECG10: 7 DEGREES
CALCULATED T AXIS, ECG11: -25 DEGREES
DIAGNOSIS, 93000: NORMAL
DIFFERENTIAL METHOD BLD: ABNORMAL
EOSINOPHIL # BLD: 0 K/UL (ref 0–0.4)
EOSINOPHIL NFR BLD: 0 % (ref 0–5)
ERYTHROCYTE [DISTWIDTH] IN BLOOD BY AUTOMATED COUNT: 14.3 % (ref 11.6–14.5)
HCT VFR BLD AUTO: 27.1 % (ref 36–48)
HGB BLD-MCNC: 9.3 G/DL (ref 13–16)
LYMPHOCYTES # BLD: 1.4 K/UL (ref 0.8–3.5)
LYMPHOCYTES NFR BLD: 9 % (ref 20–51)
MCH RBC QN AUTO: 30.9 PG (ref 24–34)
MCHC RBC AUTO-ENTMCNC: 34.3 G/DL (ref 31–37)
MCV RBC AUTO: 90 FL (ref 74–97)
MONOCYTES # BLD: 1.9 K/UL (ref 0–1)
MONOCYTES NFR BLD: 12 % (ref 2–9)
NEUTS SEG # BLD: 12.6 K/UL (ref 1.8–8)
NEUTS SEG NFR BLD: 79 % (ref 42–75)
P-R INTERVAL, ECG05: 168 MS
PLATELET # BLD AUTO: 325 K/UL (ref 135–420)
PLATELET COMMENTS,PCOM: ABNORMAL
PMV BLD AUTO: 9.7 FL (ref 9.2–11.8)
Q-T INTERVAL, ECG07: 362 MS
QRS DURATION, ECG06: 76 MS
QTC CALCULATION (BEZET), ECG08: 427 MS
RBC # BLD AUTO: 3.01 M/UL (ref 4.7–5.5)
RBC MORPH BLD: ABNORMAL
SERVICE CMNT-IMP: NORMAL
VENTRICULAR RATE, ECG03: 84 BPM
WBC # BLD AUTO: 15.9 K/UL (ref 4.6–13.2)

## 2021-04-05 PROCEDURE — 82553 CREATINE MB FRACTION: CPT

## 2021-04-05 PROCEDURE — 99232 SBSQ HOSP IP/OBS MODERATE 35: CPT | Performed by: INTERNAL MEDICINE

## 2021-04-05 PROCEDURE — 2709999900 HC NON-CHARGEABLE SUPPLY

## 2021-04-05 PROCEDURE — 74011250637 HC RX REV CODE- 250/637: Performed by: SURGERY

## 2021-04-05 PROCEDURE — 93005 ELECTROCARDIOGRAM TRACING: CPT

## 2021-04-05 PROCEDURE — 36415 COLL VENOUS BLD VENIPUNCTURE: CPT

## 2021-04-05 PROCEDURE — 85730 THROMBOPLASTIN TIME PARTIAL: CPT

## 2021-04-05 PROCEDURE — 85025 COMPLETE CBC W/AUTO DIFF WBC: CPT

## 2021-04-05 PROCEDURE — 74011000250 HC RX REV CODE- 250: Performed by: SURGERY

## 2021-04-05 PROCEDURE — 74011250636 HC RX REV CODE- 250/636: Performed by: INTERNAL MEDICINE

## 2021-04-05 PROCEDURE — 74011000258 HC RX REV CODE- 258: Performed by: NURSE PRACTITIONER

## 2021-04-05 PROCEDURE — 74011250636 HC RX REV CODE- 250/636: Performed by: NURSE PRACTITIONER

## 2021-04-05 PROCEDURE — 74011250636 HC RX REV CODE- 250/636: Performed by: SURGERY

## 2021-04-05 PROCEDURE — 65660000004 HC RM CVT STEPDOWN

## 2021-04-05 PROCEDURE — 93971 EXTREMITY STUDY: CPT

## 2021-04-05 PROCEDURE — 97162 PT EVAL MOD COMPLEX 30 MIN: CPT

## 2021-04-05 RX ORDER — DILTIAZEM HYDROCHLORIDE 5 MG/ML
5 INJECTION INTRAVENOUS CONTINUOUS
Status: DISCONTINUED | OUTPATIENT
Start: 2021-04-05 | End: 2021-04-05

## 2021-04-05 RX ORDER — HEPARIN SODIUM 1000 [USP'U]/ML
40 INJECTION, SOLUTION INTRAVENOUS; SUBCUTANEOUS ONCE
Status: COMPLETED | OUTPATIENT
Start: 2021-04-05 | End: 2021-04-05

## 2021-04-05 RX ADMIN — LOSARTAN POTASSIUM 100 MG: 50 TABLET, FILM COATED ORAL at 09:46

## 2021-04-05 RX ADMIN — OXYCODONE HYDROCHLORIDE AND ACETAMINOPHEN 2 TABLET: 5; 325 TABLET ORAL at 09:45

## 2021-04-05 RX ADMIN — HYDROMORPHONE HYDROCHLORIDE 1 MG: 1 INJECTION, SOLUTION INTRAMUSCULAR; INTRAVENOUS; SUBCUTANEOUS at 07:48

## 2021-04-05 RX ADMIN — ATORVASTATIN CALCIUM 80 MG: 40 TABLET, FILM COATED ORAL at 21:19

## 2021-04-05 RX ADMIN — HYDROMORPHONE HYDROCHLORIDE 1 MG: 1 INJECTION, SOLUTION INTRAMUSCULAR; INTRAVENOUS; SUBCUTANEOUS at 00:13

## 2021-04-05 RX ADMIN — METOPROLOL TARTRATE 25 MG: 25 TABLET, FILM COATED ORAL at 09:46

## 2021-04-05 RX ADMIN — GABAPENTIN 400 MG: 300 CAPSULE ORAL at 09:47

## 2021-04-05 RX ADMIN — METOPROLOL TARTRATE 25 MG: 25 TABLET, FILM COATED ORAL at 17:06

## 2021-04-05 RX ADMIN — Medication 10 ML: at 21:19

## 2021-04-05 RX ADMIN — THERA TABS 1 TABLET: TAB at 09:47

## 2021-04-05 RX ADMIN — OXYCODONE HYDROCHLORIDE AND ACETAMINOPHEN 2 TABLET: 5; 325 TABLET ORAL at 18:06

## 2021-04-05 RX ADMIN — HYDROMORPHONE HYDROCHLORIDE 1 MG: 1 INJECTION, SOLUTION INTRAMUSCULAR; INTRAVENOUS; SUBCUTANEOUS at 02:19

## 2021-04-05 RX ADMIN — ASPIRIN 81 MG CHEWABLE TABLET 81 MG: 81 TABLET CHEWABLE at 09:46

## 2021-04-05 RX ADMIN — Medication 10 ML: at 05:28

## 2021-04-05 RX ADMIN — HEPARIN SODIUM 14 UNITS/KG/HR: 10000 INJECTION, SOLUTION INTRAVENOUS at 07:49

## 2021-04-05 RX ADMIN — HYDROMORPHONE HYDROCHLORIDE 1 MG: 1 INJECTION, SOLUTION INTRAMUSCULAR; INTRAVENOUS; SUBCUTANEOUS at 21:19

## 2021-04-05 RX ADMIN — HEPARIN SODIUM 14 UNITS/KG/HR: 10000 INJECTION, SOLUTION INTRAVENOUS at 21:57

## 2021-04-05 RX ADMIN — HYDROMORPHONE HYDROCHLORIDE 1 MG: 1 INJECTION, SOLUTION INTRAMUSCULAR; INTRAVENOUS; SUBCUTANEOUS at 10:33

## 2021-04-05 RX ADMIN — GABAPENTIN 400 MG: 300 CAPSULE ORAL at 21:19

## 2021-04-05 RX ADMIN — HYDROMORPHONE HYDROCHLORIDE 1 MG: 1 INJECTION, SOLUTION INTRAMUSCULAR; INTRAVENOUS; SUBCUTANEOUS at 05:28

## 2021-04-05 RX ADMIN — Medication 10 ML: at 15:18

## 2021-04-05 RX ADMIN — OXYCODONE HYDROCHLORIDE AND ACETAMINOPHEN 2 TABLET: 5; 325 TABLET ORAL at 14:09

## 2021-04-05 RX ADMIN — HYDROMORPHONE HYDROCHLORIDE 0.5 MG: 1 INJECTION, SOLUTION INTRAMUSCULAR; INTRAVENOUS; SUBCUTANEOUS at 12:06

## 2021-04-05 RX ADMIN — GABAPENTIN 400 MG: 300 CAPSULE ORAL at 17:06

## 2021-04-05 RX ADMIN — FUROSEMIDE 20 MG: 20 TABLET ORAL at 09:47

## 2021-04-05 RX ADMIN — SODIUM CHLORIDE 5 MG/HR: 900 INJECTION, SOLUTION INTRAVENOUS at 23:20

## 2021-04-05 RX ADMIN — HEPARIN SODIUM 3590 UNITS: 1000 INJECTION INTRAVENOUS; SUBCUTANEOUS at 02:18

## 2021-04-05 RX ADMIN — DEXTROSE MONOHYDRATE AND SODIUM CHLORIDE 75 ML/HR: 5; .45 INJECTION, SOLUTION INTRAVENOUS at 21:37

## 2021-04-05 NOTE — PROGRESS NOTES
Problem: Falls - Risk of  Goal: *Absence of Falls  Description: Document Aartiluiz Napoles Fall Risk and appropriate interventions in the flowsheet. Outcome: Progressing Towards Goal  Note: Fall Risk Interventions:  Mobility Interventions: Patient to call before getting OOB, Communicate number of staff needed for ambulation/transfer, Bed/chair exit alarm         Medication Interventions: Bed/chair exit alarm, Evaluate medications/consider consulting pharmacy    Elimination Interventions: Bed/chair exit alarm, Patient to call for help with toileting needs, Toileting schedule/hourly rounds              Problem: Patient Education: Go to Patient Education Activity  Goal: Patient/Family Education  Outcome: Progressing Towards Goal     Problem: Pressure Injury - Risk of  Goal: *Prevention of pressure injury  Description: Document Efrem Scale and appropriate interventions in the flowsheet.   Outcome: Progressing Towards Goal  Note: Pressure Injury Interventions:  Sensory Interventions: Assess changes in LOC, Keep linens dry and wrinkle-free, Pressure redistribution bed/mattress (bed type)    Moisture Interventions: Absorbent underpads, Check for incontinence Q2 hours and as needed    Activity Interventions: Assess need for specialty bed, Chair cushion, Increase time out of bed, Pressure redistribution bed/mattress(bed type), PT/OT evaluation    Mobility Interventions: PT/OT evaluation, Pressure redistribution bed/mattress (bed type)    Nutrition Interventions: Document food/fluid/supplement intake                     Problem: Patient Education: Go to Patient Education Activity  Goal: Patient/Family Education  Outcome: Progressing Towards Goal     Problem: Patient Education: Go to Patient Education Activity  Goal: Patient/Family Education  Outcome: Progressing Towards Goal

## 2021-04-05 NOTE — ROUTINE PROCESS
Bedside and Verbal shift change report given to Chuy Carolina RN (oncoming nurse) by Jessy Gupta RN (offgoing nurse). Report included the following information SBAR, Kardex, Intake/Output, MAR, Recent Results and Cardiac Rhythm NSR.

## 2021-04-05 NOTE — PROGRESS NOTES
1900 - Bedside and Verbal shift change report given to Huang Woods RN (oncoming nurse) by Gerard Vu RN (offgoing nurse). Report included the following information SBAR, Kardex, Intake/Output, MAR, Recent Results and Cardiac Rhythm SR/ST . 2200 - Patient tachycardic @ 155-191 bpm; no pain, discomfort or palpitations felt by patient; hospitalist paged for guidance; will continue to monitor patient. 12 - Dr Jacobo Daugherty returned page and was informed of patients status; MD informed that attending MD will need to be notified, RN acknowledged and paged attending MD for assistance; will continue to monitor patient. 2224 - Call made to on call service, informed that attending MD will be returning call; will continue to monitor patient. 2229 - Return call received from Dr Addison Shone who ordered a 250mL bolus, an EKG and cardiac enzymes to be drawn for analysis; order confirmed with read back and entered; MD requested to be informed if EKG reads as afib, RN acknowledged; call made to phlebotomy to have labs drawn; other orders carried out as ordered; will continue to monitor patient. 0 - Attending MD paged via after hours service to inform that EKG is stating atrial fibrillation with RVR; patients heart rate has gone as high as 199 bpm; MD requested cardiology MD be paged to inform of patient's current heart irregularities; call made to cardiology MDs on call answering service for assistance; answering service informed that NP on call did not answer call and call will be returned once page is answered; will continue to monitor patient. 2300 - Bolus completed, patient lying in bed watching tv, no distress, no pain, discomfort or palpitations per patient; will continue to monitor patient.     2305 - Return call received from the cardiology answering service and call connected to [facility], NP who ordered this patient be started on a  cardizem drip @ 5mg/hr to regulate uncontrolled afib; order confirmed with read back and entered; gtt started as ordered, will continue to monitor patient. 2330 - Diltiazem gtt started @ 5mg/hr; patient still c/o no discomfort; will continue to monitor patient. 0030 - Patient's heart rate ranging from 177-200 bpm; call made to on call MD to inform of patient's status following initiation of cardizem gtt with continued a-fib with RVR; call connected to Cecilia Menendez NP who ordered 0.5mg of digoxin IV push, increase cardizem gtt to 10mg/hr, increase D5 1/5 IV fluids to 100ml/hr; order confirmed with read back; orders entered and administered as ordered; will continue to monitor patient. 0129 - Call received from Cecilia Menendez NP to check patient's current status following interventions; RN informed that patient's HR continues to remain above normal parameters but seems to be decreasing slowly, patient lying in bed, no c/o pain or discomfort; during call RN informed of varying heart rate between 137-176, NP stated it seems to be trending down but requested this RN continue to monitor patient, RN acknowledged; NP ordered an additional dose of digoxin of 250mcg IV push and to increase cardizem to 12.5mg/hr if HR remains above 120bpm; orders verified by read back and input as ordered; will continue to monitor patient. 1670 - Patient resting in bed quietly, no c/o pain or discomfort; heart rate ranging from 129-169; cardizem gtt increased to 12.5mg/hr per previous order; will continue to monitor patient. 0430 - 2nd dose of digoxin IV push (250mcg in 10mL NS) administered to attempt to descrease heart rate remaining above 120; patient lying in bed watching tv with no c/o pain, discomfort, distress or palpitations; right arm IV line pulled out mistakenly by patient with movement in bed, line replaced with 20G in right hand X 1 attempt; will continue to monitor.     0293 - Call made to Cecilia Menendez NP to inform that this patients heart rate is again ranging from 122-189 bpm; orders given to increase cardizem gtt to 15mg/hr and to administer this morning's metoprolol dose now and NP will come to assess patient this morning to follow up; orders verified by read back and entered as ordered; will continue to monitor patient. 2801 - Call received from [address] [place] [address], NP who requested this member have a BNP and Magnesium labs drawn stat; order verified by read back and entered as ordered; call made to lab to inform of urgent lab request; will continue to monitor patient. 0700 - Bedside and Verbal shift change report given to Vicki Rush RN (oncoming nurse) by Maycol Torres RN (offgoing nurse). Report included the following information SBAR, Kardex, Intake/Output, MAR, Recent Results and Cardiac Rhythm Afib with RVRs.

## 2021-04-05 NOTE — PROGRESS NOTES
Bedside and Verbal shift change report given to Huang Woods RN (oncoming nurse) by Herman Fleischer, RN (offgoing nurse). Report included the following information SBAR, Kardex, Procedure Summary, Intake/Output, MAR, Recent Results and Med Rec Status.

## 2021-04-05 NOTE — CONSULTS
Infectious Disease Consultation Note        Reason: Possible infectious process    Current abx Prior abx         Lines: PIV      Assessment :    78-year-old male with history of CAD status post stenting of circumflex and RCA in 2017, HLD, peripheral vascular disease status post left AKA and intermittent right lower extremity claudication who was admitted on 3/31 to vascular surgery service with right calf and leg pain:    1-Persistent leukocytosis: Possibly reactive/ post procedure-less likely an infectious process (with no respiratory, GI,  or incision area issues)-15.9K today (T-max 100.4 the day after procedure 4/3)-afebrile now  -Blood culture 4/4 x 1 set negative at 18 hours  -Urine culture 4/4 negative   -Chest x-ray unremarkable for any acute findings    2-Right pretibial erythema and warmth-patient reports it was present before but slightly more now after procedure  -Quite tender, and due to recent infarct/blockage    3-Right lower extremity bypass occlusion  -S/P R thrombectomy with axillofemoral bypass, femoral popliteal bypass, revision of right femoral artery and patch angioplasty, right leg angiogram with balloon angioplasty of right popliteal artery- 4/2    Lab Results   Component Value Date/Time    Hemoglobin A1c 5.2 12/29/2015 07:18 AM         Recommendations:  Continue to monitor off antibiotics at this point  We will follow up on blood cultures in progress as well as some urine culture  Anticoagulation as well as antiplatelet therapy per cardiology and vascular  Continue supportive care      Thank you for consultation request. Above plan was discussed in details with patient. Please call me if any further questions or concerns. Will continue to participate in the care of this patient.     HPI:  Mr. Stephanie Bedoya is a 78-year-old male with history of CAD status post stenting of circumflex and RCA in 2017, HLD, peripheral vascular disease status post left AKA and intermittent right lower extremity claudication who was admitted on 3/31 to vascular surgery service with right calf and leg pain. Patient reports ongoing redness slight swelling to the anterior aspect of the right shin and lower leg area. This redness has stayed the same. However the pain has gotten worse. on admission a right lower extremity Doppler showed right leg bypass graft occlusion. On 4/20 he underwent right thrombectomy with axillofemoral bypass as well as femoral popliteal bypass, revision of right femoral artery and patch angioplasty as well as right leg angiogram with balloon angioplasty of right popliteal artery. Soon after he was started on anticoagulation and antiplatelet therapy. Neurology has been following due to EKG abnormalities, A. fib and history of cardiac disease. Due to increased pain and redness in the area right pretibial area as well as elevated WBCs patient had blood cultures UA with urine culture as well as a chest x-ray for infectious work-up. Patient reports no fevers, chills. Denies any respiratory symptoms including shortness of breath, coughing, congestion. He denies any nausea, vomiting, diarrhea. He does not have any dysuria. The main and only can complain remains the area slight increase in pain of the right lower leg.     Past Medical History:   Diagnosis Date    Acute blood loss as cause of postoperative anemia 4/4/2017    Anticoagulated on Coumadin     Aortoiliac occlusive disease (HCC) 1/25/2017    Atherosclerosis of native artery of both lower extremities with intermittent claudication ([address] 1/25/2017    Benign hypertensive heart disease with systolic congestive heart failure, NYHA class 2 ([address] 1/26/2015    Carotid artery disease (HCC)     Chronic anemia     Chronic systolic heart failure (HCC)     Chronic ulcer of right foot ([address] 4/4/2017    Chronic ulcer of right heel ([address] 8/8/2017    Coronary artery disease involving native coronary artery of native heart 3/15/2017 Successful stenting of Cx (Xience LESLEY) and RCA (Xience LESLEY) to 0% by Dr. Livan Jain on 3/15/17.     DDD (degenerative disc disease), lumbar 1/26/2015    Dyslipidemia     Erectile dysfunction 7/5/2016    Euthyroid sick syndrome 4/6/2017    Hereditary peripheral neuropathy 11/15/2016    History of cardioversion 4/18/2017    S/P Synchronized external cardioversion (4/18/2017 - Dr. Christine Watson)    History of vitamin D deficiency 4/22/2017    Vitamin D 25-Hydroxy (4/22/2017) = 12.0; Vitamin D 25-Hydroxy (5/26/2017) = 38.7    Infection of vascular bypass graft ([facility] 7/24/2017    Left lower extremity    Ischemic cardiomyopathy     Moderate to severe pulmonary hypertension ([address] 7/23/2017    Paroxysmal atrial fibrillation (HCC)     Peripheral artery disease ([address] 1/25/2017    Skin ulcer of malleolar area of right ankle ([address]     Spinal stenosis of lumbar region with radiculopathy 5/4/2015    Dr. Shannon Huizar       Past Surgical History:   Procedure Laterality Date    CARDIAC CATHETERIZATION  3/8/2017         CARDIAC CATHETERIZATION  3/15/2017         CORONARY STENT SINGLE W/PTCA  3/15/2017         HX ABOVE KNEE AMPUTATION Left 08/18/2017    S/P Left above-the-knee amputation (8/18/2017 - Dr. Juliocesar Landis)    HX ATHERECTOMY Left 06/15/2017    S/P Atherectomy and balloon angioplasty of the left superficial femoral artery and above-knee popliteal artery (6/15/2017 - Dr. Juliocesar Landis)    HX ATHERECTOMY Right 09/07/2017    S/P Atherectomy and balloon angioplasty of the below-the-knee popliteal artery, above-the-knee popliteal artery, tibioperoneal trunk artery and posterior tibial artery (9/7/2017 - Dr. Juliocesar Landis)    HX COLONOSCOPY  07/23/2015    polyps repeat 2020 5 years [address] 89 Right 04/04/2017    S/P Right axillary to bifemoral artery bypass using an 8 mm Propaten graft from the right axilla to the right common femoral artery with a jump femoral-femoral bypass with another 8 mm Propaten external ring bypass; Right common femoral artery, and profunda femoris artery and superficial femoral artery endarterectomy causing an extensive surgery on the right side (4/4/2017 - Dr. Sully Oakes)   [address] Right 04/07/2017    S/P Cutdown of femoral-femoral bypass, more on the left groin side; Right lower extremity angiography with first-order catheterization (4/7/2017 - Dr. Cynthia Sol)    HX FEMORAL BYPASS Right 04/10/2017    S/P Right femoral to above-knee popliteal bypass with an 8 mm PTFE graft (4/10/2017 - Dr. Bhaskar Napoles)    HX OTHER SURGICAL Left 07/25/2017    S/P Removal of infected graft; Repair of left superficial femoral artery; Repair of left common femoral artery (7/25/2017 - Dr. Cynthia Sol)    HX OTHER SURGICAL Right 06/27/2017    S/P Drainage of right side abscess (6/27/2017 - Dr. Cynthia Sol)    HX OTHER SURGICAL Left 07/01/2017    S/P Left groin exploration; Left femoral repair pseudoaneurysm; Left wound washout; Wound VAC placement (7/01/2017 - Dr. Cynthia Sol)    HX OTHER SURGICAL Left 07/04/2017    S/P Left common femoral artery ligation; Jump bypass from right to left fem-fem to a more distal superficial femoral artery using 8 mm external ringed Propaten graft; Left groin wound exploration and washout (7/4/2017 - Dr. Cynthia Sol)    HX OTHER SURGICAL Right 08/18/2017    S/P Right axillary femoral jump bypass interposition; Removal of infected right axillary femoral bypass;  Wound VAC placement of upper thigh 1.2 cm x 5.2 cm x 1.8 cm and groin 4 cm x 0.5 cm x 0.2 cm (8/18/2017 - Dr. Cynthia Sol)       Current Discharge Medication List      CONTINUE these medications which have NOT CHANGED    Details   furosemide (LASIX) 20 mg tablet TAKE 1 TABLET DAILY  Qty: 90 Tab, Refills: 1    Associated Diagnoses: Benign hypertensive heart disease with systolic congestive heart failure, NYHA class 2 ([facility] Chronic systolic heart failure (HCC)      clopidogreL (PLAVIX) 75 mg tab TAKE 1 TABLET ONCE DAILY  Qty: 90 Tab, Refills: 3      metoprolol tartrate (LOPRESSOR) 25 mg tablet TAKE 1 TABLET EVERY 12     HOURS  Qty: 180 Tab, Refills: 1      atorvastatin (LIPITOR) 80 mg tablet TAKE 1 TABLET ONCE DAILY  Qty: 90 Tab, Refills: 3      losartan (COZAAR) 100 mg tablet TAKE 1 TABLET ONCE DAILY  Qty: 90 Tab, Refills: 3      aspirin 81 mg chewable tablet Take 1 Tab by mouth daily. Qty: 90 Tab, Refills: 3      gabapentin (NEURONTIN) 400 mg capsule take 1 capsule by mouth three times a day  Qty: 270 Cap, Refills: 0      multivitamin (ONE A DAY) tablet Take 1 Tab by mouth daily.              Current Facility-Administered Medications   Medication Dose Route Frequency    heparin 25,000 units in D5W 250 ml infusion  12-25 Units/kg/hr IntraVENous TITRATE    dextrose 5 % - 0.45% NaCl infusion  75 mL/hr IntraVENous CONTINUOUS    HYDROmorphone (DILAUDID) syringe 0.5-1 mg  0.5-1 mg IntraVENous Q2H PRN    sodium chloride (NS) flush 5-40 mL  5-40 mL IntraVENous Q8H    sodium chloride (NS) flush 5-40 mL  5-40 mL IntraVENous PRN    aspirin chewable tablet 81 mg  81 mg Oral DAILY    atorvastatin (LIPITOR) tablet 80 mg  80 mg Oral QHS    furosemide (LASIX) tablet 20 mg  20 mg Oral DAILY    gabapentin (NEURONTIN) capsule 400 mg  400 mg Oral TID    losartan (COZAAR) tablet 100 mg  100 mg Oral DAILY    metoprolol tartrate (LOPRESSOR) tablet 25 mg  25 mg Oral BID    therapeutic multivitamin (THERAGRAN) tablet 1 Tab  1 Tab Oral DAILY    oxyCODONE-acetaminophen (PERCOCET) 5-325 mg per tablet 1-2 Tab  1-2 Tab Oral Q4H PRN       Allergies: Morphine    Family History   Problem Relation Age of Onset    Heart Disease Father      Social History     Socioeconomic History    Marital status: LEGALLY      Spouse name: Not on file    Number of children: Not on file    Years of education: Not on file    Highest education level: Not on file   Occupational History    Not on file   Social Needs    Financial resource strain: Not on file    Food insecurity     Worry: Not on file     Inability: Not on file    Transportation needs     Medical: Not on file     Non-medical: Not on file   Tobacco Use    Smoking status: Former Smoker    Smokeless tobacco: Never Used    Tobacco comment: quit in    Substance and Sexual Activity    Alcohol use: Yes     Alcohol/week: 2.0 standard drinks     Types: 2 Cans of beer per week     Comment: 10 cans of beer a month    Drug use: Yes     Types: Marijuana     Comment: occasionally-last used     Sexual activity: Yes     Partners: Female   Lifestyle    Physical activity     Days per week: Not on file     Minutes per session: Not on file    Stress: Not on file   Relationships    Social connections     Talks on phone: Not on file     Gets together: Not on file     Attends Sabianism service: Not on file     Active member of club or organization: Not on file     Attends meetings of clubs or organizations: Not on file     Relationship status: Not on file    Intimate partner violence     Fear of current or ex partner: Not on file     Emotionally abused: Not on file     Physically abused: Not on file     Forced sexual activity: Not on file   Other Topics Concern    Not on file   Social History Narrative    Not on file     Social History     Tobacco Use   Smoking Status Former Smoker   Smokeless Tobacco Never Used   Tobacco Comment    quit in         Temp (24hrs), Av.7 °F (37.1 °C), Min:98.3 °F (36.8 °C), Max:99.1 °F (37.3 °C)    Visit Vitals  /67   Pulse 84   Temp 98.7 °F (37.1 °C)   Resp 18   Ht 5' 10\" (1.778 m)   Wt 88.2 kg (194 lb 6.4 oz)   SpO2 98%   BMI 27.89 kg/m²       ROS: 12 point ROS obtained in details.  Pertinent positives as mentioned in HPI,   otherwise negative    Physical Exam:    General: Well developed, well nourished male laying on the bed/sitting on the  bed AAOx3 in no acute distress. General:   awake alert and oriented   HEENT:  Normocephalic, atraumatic, PERRL, EOMI, no scleral icterus or pallor; no conjunctival hemmohage;  nasal and oral mucous are moist and without evidence of lesions. No thrush. Dentition good. Neck supple, no bruits. Lymph Nodes:   no cervical, axillary or inguinal adenopathy   Lungs:   non-labored, bilaterally clear to auscultation- no crackles wheezes rales or rhonchi   Heart:  RRR, s1 and s2; no murmurs rubs or gallops, no edema, + pedal pulses   Abdomen:  soft, non-distended, active bowel sounds, no hepatomegaly, no splenomegaly. Appropriate surgical scars for stated surgeries. Non-tender   Genitourinary:  deferred   Extremities:   no clubbing, cyanosis; no joint effusions or swelling; Full ROM of all large joints to the upper and lower extremities; muscle mass appropriate for age   Neurologic:  No gross focal sensory abnormalities; 5/5 muscle strength to upper and lower extremities. Speech appropriate.  Cranial nerves intact                        Skin:  No rash or ulcers noted   Wound:       Back:  no spinal or paraspinal muscle tenderness or rigidity, no CVA tenderness     Psychiatric:  No suicidal or homicidal ideations, appropriate mood and affect         Labs: Results:   Chemistry Recent Labs     04/03/21  0550   GLU 97      K 3.4*      CO2 26   BUN 9   CREA 0.73   CA 7.6*   AGAP 6   BUCR 12      CBC w/Diff Recent Labs     04/05/21  0525 04/04/21  0520 04/03/21  0550   WBC 15.9* 14.3* 15.4*   RBC 3.01* 3.01* 3.13*   HGB 9.3* 9.3* 9.7*   HCT 27.1* 27.5* 28.7*    270 272   GRANS 79*  --  74   LYMPH 9*  --  17*   EOS 0  --  0      Microbiology Recent Labs     04/04/21  1241 04/04/21  1220   CULT NO GROWTH AFTER 18 HOURS No growth (<1,000 CFU/ML)          RADIOLOGY:    All available imaging studies/reports in [facility] for this admission were reviewed      Disclaimer: Sections of this note are dictated utilizing voice recognition software, which may have resulted in some phonetic based errors in grammar and contents. Even though attempts were made to correct all the mistakes, some may have been missed, and remained in the body of the document. If questions arise, please contact our department.     Dr. Joe Segovia, Infectious Disease Specialist  [phone]  April 5, 2021  4:44 PM

## 2021-04-05 NOTE — PROGRESS NOTES
Problem: Mobility Impaired (Adult and Pediatric)  Goal: *Acute Goals and Plan of Care (Insert Text)  Description: Physical Therapy Goals  Initiated 4/5/2021 and to be accomplished within 7 day(s)  1. Patient will move from supine to sit and sit to supine , scoot up and down, and roll side to side in bed with modified independence. 2.  Patient will transfer from bed to chair and chair to bed with minimal assistance using the least restrictive device. 3.  Patient will perform sit to stand with minimal assistance. 4.  Patient will ambulate with minimal assistance for 10 feet with the least restrictive device. 5.  Patient will ascend/descend stairs pending progress and need for discharge. 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 Outcome: Progressing Towards Goal     PHYSICAL THERAPY EVALUATION    Patient: Saeid Linares (26 y.o. male)  Date: 4/5/2021  Primary Diagnosis: Atherosclerosis of native artery of right lower extremity with intermittent claudication (HCC) [I70.211]  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HCC) [I70.201]  Procedure(s) (LRB):  RIGHT LEG THROMBECTOMY (Right) 3 Days Post-Op   Precautions:      WBAT  PLOF: see above    ASSESSMENT :  Based on the objective data described below, the patient presents POD 3 for RLE thrombectomy. Pt has hx of L AKA and was ind PTA with a prothetic and living along. Pt reports 10/10 pain at rest upon arrival and was cleared to participate. Pt is limited by impaired sensation with \"stabbing pain\" to R anterior shin with decreased light touch sensation to dorsal foot with increased edema noted as well as decreased AROM to toe. Pt is able to sit up on EOB with SBA however this increases his pain even more in a dependent position and thus not able to progress to standing this date, pt declines attempting BSC transfer as well.  At this time pt would benefit from skilled PT in the acute setting to progress towards PLOF as he heals. Recommend rehab upon discharge pending progress. Nurse notified of session and pt left in bed at end of session for LE duplex imaging. Patient will benefit from skilled intervention to address the above impairments. Patient's rehabilitation potential is considered to be Good  Factors which may influence rehabilitation potential include:   []         None noted  []         Mental ability/status  [x]         Medical condition  [x]         Home/family situation and support systems  []         Safety awareness  [x]         Pain tolerance/management  []         Other:      PLAN :  Recommendations and Planned Interventions:   [x]           Bed Mobility Training             [x]    Neuromuscular Re-Education  [x]           Transfer Training                   []    Orthotic/Prosthetic Training  [x]           Gait Training                          []    Modalities  [x]           Therapeutic Exercises           []    Edema Management/Control  [x]           Therapeutic Activities            [x]    Family Training/Education  [x]           Patient Education  []           Other (comment):    Frequency/Duration: Patient will be followed by physical therapy 1-2 times per day/4-7 days per week to address goals. Discharge Recommendations: Rehab  Further Equipment Recommendations for Discharge: TBD      SUBJECTIVE:   Patient stated I have so much pain.     OBJECTIVE DATA SUMMARY:     Past Medical History:   Diagnosis Date    Acute blood loss as cause of postoperative anemia 4/4/2017    Anticoagulated on Coumadin     Aortoiliac occlusive disease ([address] 1/25/2017    Atherosclerosis of native artery of both lower extremities with intermittent claudication ([address] 1/25/2017    Benign hypertensive heart disease with systolic congestive heart failure, NYHA class 2 ([address] 1/26/2015    Carotid artery disease (HCC)     Chronic anemia     Chronic systolic heart failure (HCC)     Chronic ulcer of right foot ([address] 4/4/2017    Chronic ulcer of right heel ([address] 8/8/2017    Coronary artery disease involving native coronary artery of native heart 3/15/2017    Successful stenting of Cx (Xience LESLEY) and RCA (Xience LESLEY) to 0% by Dr. Keenan López on 3/15/17.     DDD (degenerative disc disease), lumbar 1/26/2015    Dyslipidemia     Erectile dysfunction 7/5/2016    Euthyroid sick syndrome 4/6/2017    Hereditary peripheral neuropathy 11/15/2016    History of cardioversion 4/18/2017    S/P Synchronized external cardioversion (4/18/2017 - Dr. Radha Baird)    History of vitamin D deficiency 4/22/2017    Vitamin D 25-Hydroxy (4/22/2017) = 12.0; Vitamin D 25-Hydroxy (5/26/2017) = 38.7    Infection of vascular bypass graft ([address] 7/24/2017    Left lower extremity    Ischemic cardiomyopathy     Moderate to severe pulmonary hypertension ([address] 7/23/2017    Paroxysmal atrial fibrillation ([address]     Peripheral artery disease ([address] 1/25/2017    Skin ulcer of malleolar area of right ankle ([address]     Spinal stenosis of lumbar region with radiculopathy 5/4/2015    Dr. Viviane Murillo     Past Surgical History:   Procedure Laterality Date    CARDIAC CATHETERIZATION  3/8/2017         CARDIAC CATHETERIZATION  3/15/2017         CORONARY STENT SINGLE W/PTCA  3/15/2017         HX ABOVE KNEE AMPUTATION Left 08/18/2017    S/P Left above-the-knee amputation (8/18/2017 - Dr. Faustine Dakin)    HX ATHERECTOMY Left 06/15/2017    S/P Atherectomy and balloon angioplasty of the left superficial femoral artery and above-knee popliteal artery (6/15/2017 - Dr. Faustine Dakin)    HX ATHERECTOMY Right 09/07/2017    S/P Atherectomy and balloon angioplasty of the below-the-knee popliteal artery, above-the-knee popliteal artery, tibioperoneal trunk artery and posterior tibial artery (9/7/2017 - Dr. Faustine Dakin)    HX COLONOSCOPY  07/23/2015    polyps repeat 2020 5 years Ghanshyam Henderson Right 04/04/2017    S/P Right axillary to bifemoral artery bypass using an 8 mm Propaten graft from the right axilla to the right common femoral artery with a jump femoral-femoral bypass with another 8 mm Propaten external ring bypass; Right common femoral artery, and profunda femoris artery and superficial femoral artery endarterectomy causing an extensive surgery on the right side (4/4/2017 - Dr. Janna Kelley)    [facility] Right 04/07/2017    S/P Cutdown of femoral-femoral bypass, more on the left groin side; Right lower extremity angiography with first-order catheterization (4/7/2017 - Dr. Yasmine Lockwood)    [address] Right 04/10/2017    S/P Right femoral to above-knee popliteal bypass with an 8 mm PTFE graft (4/10/2017 - Dr. Jayro Flaherty)    HX OTHER SURGICAL Left 07/25/2017    S/P Removal of infected graft; Repair of left superficial femoral artery; Repair of left common femoral artery (7/25/2017 - Dr. Yasmine Lockwood)    HX OTHER SURGICAL Right 06/27/2017    S/P Drainage of right side abscess (6/27/2017 - Dr. Yasmine Lockwood)    HX OTHER SURGICAL Left 07/01/2017    S/P Left groin exploration; Left femoral repair pseudoaneurysm; Left wound washout; Wound VAC placement (7/01/2017 - Dr. Yasmine Lockwood)    HX OTHER SURGICAL Left 07/04/2017    S/P Left common femoral artery ligation; Jump bypass from right to left fem-fem to a more distal superficial femoral artery using 8 mm external ringed Propaten graft; Left groin wound exploration and washout (7/4/2017 - Dr. Yasmine Lockwood)    HX OTHER SURGICAL Right 08/18/2017    S/P Right axillary femoral jump bypass interposition; Removal of infected right axillary femoral bypass;  Wound VAC placement of upper thigh 1.2 cm x 5.2 cm x 1.8 cm and groin 4 cm x 0.5 cm x 0.2 cm (8/18/2017 - Dr. Yasmine Lockwood)     Barriers to Learning/Limitations: yes;  physical  Compensate with: Visual Cues, Verbal Cues, and Tactile Cues  Home Situation:  Home Situation  Home Environment: Private residence  # Steps to Enter: 3  Rails to [employer]: Yes  One/Two Story Residence: One story  Living Alone: Yes  Support Systems: Family member(s)  Patient Expects to be Discharged to[de-identified] Private residence  Current DME Used/Available at Home: Janiya Contreras, straight, Shower chair, Walker, Wheelchair  Critical Behavior:  Neurologic State: Alert  Orientation Level: Oriented X4  Cognition: Follows commands  Safety/Judgement: Fall prevention  Psychosocial  Patient Behaviors: Calm; Cooperative                 Strength:    Strength: Generally decreased, functional       Tone & Sensation:   Tone: Normal    Sensation: Impaired    Range Of Motion:  AROM: Generally decreased, functional(WFL L AKA, generally decreased distal to knee 2/2 pain RLE)       Functional Mobility:  Bed Mobility:  Rolling: Modified independent  Supine to Sit: Stand-by assistance  Sit to Supine: Stand-by assistance  Scooting: Stand-by assistance  Transfers:  NT this date 2/2 pain     Balance:   Sitting: Intact      Pain:  Pain level pre-treatment: 10/10   Pain level post-treatment: 10/10   Pain Intervention(s) : Medication (see MAR); Rest,  Repositioning  Response to intervention: Nurse notified    Activity Tolerance:   Fair, 2/2 pain    Please refer to the flowsheet for vital signs taken during this treatment. After treatment:   []         Patient left in no apparent distress sitting up in chair  [x]         Patient left in no apparent distress in bed  [x]         Call bell left within reach  [x]         Nursing notified  []         Caregiver present  []         Bed alarm activated  []         SCDs applied    COMMUNICATION/EDUCATION:   [x]         Role of Physical Therapy in the acute care setting. [x]         Fall prevention education was provided and the patient/caregiver indicated understanding. [x]         Patient/family have participated as able in goal setting and plan of care. [x]         Patient/family agree to work toward stated goals and plan of care.   [] Patient understands intent and goals of therapy, but is neutral about his/her participation. []         Patient is unable to participate in goal setting/plan of care: ongoing with therapy staff.  []         Other:     Thank you for this referral.  Tato العراقي   Time Calculation: 15 mins      Eval Complexity: History: MEDIUM  Complexity : 1-2 comorbidities / personal factors will impact the outcome/ POC Exam:MEDIUM Complexity : 3 Standardized tests and measures addressing body structure, function, activity limitation and / or participation in recreation  Presentation: MEDIUM Complexity : Evolving with changing characteristics  Clinical Decision Making:Medium Complexity    Overall Complexity:MEDIUM

## 2021-04-05 NOTE — PROGRESS NOTES
Tolerating diet mostly ischemic pain resolved  Working with physical therapy today  Vital signs are stable with elevated white count  Blood culture negative after few hours  Urine culture pending chest x-ray without any signs of pneumonia  Examined his right leg has pulses intact his incisions intact  He does have some redness over the anterior compartment, possible residual anterior compartment infarction with pain  We will continue with anticoagulation and follow along  Asked ID to see him with his history of complex infections

## 2021-04-05 NOTE — PROGRESS NOTES
[facility] - Progress Note  Admit Date: 3/31/2021    Assessment:         -Presented for angiography R leg due to R leg arterial occlusion - occluded R leg bypass graft.  Hx peripheral vascular disease s/p left AKA. -S/p thrombectomy R axillofemoral bypass, thrombectomy R femorpopliteal bypass, revision R femoral artery with patch angioplasty by Dr. Johnathan Fraga 4/1/2021  -S/p thrombectomy R leg femoropopliteal bypass and tibial runoff by Dr. Johnathan Farga 4/2/2021.  -Consulted due to abnormal EKG, preop  -Paroxysmal atrial fibrillation, historically on Coumadin for anticoagulation, s/p BERRY/cardioversion 4/18/2017  -Hx Nonischemic cardiomyopathy  -Echo 10/2017 with findings as follows: EF 55%, hypokinesis of basal inferior walls, normal LV wall thickness, normal diastolic function, mildly dilated RA, trivial mitral regurgitation  -CAD, cardiac cath 3/8/2017 with findings as follows:  · EF around 30%, no diagnostic segmental wall motion abnormality  · LM: Diffuse 30%  · LAD: Diffuse 30%  · Diagonal branches: Diffuse 30%  · Cx: Proximal 70%; trifurcation healed ruptured plaque with residual 90% (RYLEE 3)  · OM branches: Angiographically normal  · RCA: Diffuse 50% with focal mid 90%  -S/p successful stenting of Cx and RCA to 0% during subsequent cardiac cath 3/15/2017- stable no anginal symptoms   -Nuclear stress test 11/2020 was low-risk, EF 51%  -Dyslipidemia     Previously followed by Dr. Radha Burt, first appointment with Dr. Abran Lennox 4/27/2021     Plan:     Independently seen and evaluated. Agree with below. Would continue supportive measures from cardiac standpoint. No new cardiac recommendations at this time. Would continue to control rate as needed. He is tolerating his heart rate without difficulty. Evaluated today by Vascular  due to increasing leg pain/edema that improved.  Vascular requested ID evaluation   Follow up with blodd cultures- so far negative after couple hours   Currently on heparin drip.  High suspicion for possible embolic event with superimposed PAD. reccommended  antiplatelet + coumadin or NOAC at discharge given previous history of A.fib and now with runs of  A.tach. Subjective:      right leg pain/edema improved. Tele with sinus  short runs A.tach. No chest pain no palpitations     Objective:      Patient Vitals for the past 8 hrs:   Temp Pulse Resp BP SpO2   04/05/21 1057 98.4 °F (36.9 °C) 83 20 124/70 97 %   04/05/21 0804 98.7 °F (37.1 °C) 97 20 (!) 153/77 97 %   04/05/21 0800 -- -- -- -- 97 %         Patient Vitals for the past 96 hrs:   Weight   04/05/21 0400 88.2 kg (194 lb 6.4 oz)   04/04/21 0427 89.7 kg (197 lb 12.8 oz)   04/03/21 0619 94.3 kg (208 lb)   04/02/21 1047 94.3 kg (208 lb)   04/02/21 0545 90.7 kg (200 lb)                    Intake/Output Summary (Last 24 hours) at 4/5/2021 1308  Last data filed at 4/5/2021 1253  Gross per 24 hour   Intake 856.7 ml   Output 1450 ml   Net -593.3 ml       Physical Exam:    General:  alert, cooperative, no distress, appears stated age  Neck:  nontender  Lungs:  clear to auscultation bilaterally  Heart:  regular rate and rhythm, S1, S2 normal, no murmur, click, rub or gallop  Abdomen:  abdomen is soft without significant tenderness, masses, organomegaly or guarding  Extremities:  extremities normal, atraumatic. Left AKA.  Right LE mild edema and erythema noted     Data Review:     Labs: Results:       Chemistry Recent Labs     04/03/21  0550   GLU 97      K 3.4*      CO2 26   BUN 9   CREA 0.73   CA 7.6*   AGAP 6   BUCR 12      CBC w/Diff Recent Labs     04/05/21  0525 04/04/21  0520 04/03/21  0550   WBC 15.9* 14.3* 15.4*   RBC 3.01* 3.01* 3.13*   HGB 9.3* 9.3* 9.7*   HCT 27.1* 27.5* 28.7*    270 272   GRANS 79*  --  74   LYMPH 9*  --  17*   EOS 0  --  0      Cardiac Enzymes No results found for: CPK, CK, CKMMB, CKMB, RCK3, CKMBT, CKNDX, CKND1, LEO, TROPT, TROIQ, DEWAYNE, TROPT, TNIPOC, BNP, BNPP   Coagulation Recent Labs     04/05/21  1101 04/04/21 2058   APTT 97.4* 70.1*       Lipid Panel Lab Results   Component Value Date/Time    Cholesterol, total 105 09/08/2020 07:09 AM    HDL Cholesterol 26 (L) 09/08/2020 07:09 AM    LDL, calculated 60.4 09/08/2020 07:09 AM    VLDL, calculated 18.6 09/08/2020 07:09 AM    Triglyceride 93 09/08/2020 07:09 AM    CHOL/HDL Ratio 4.0 09/08/2020 07:09 AM      BNP No results found for: BNP, BNPP, XBNPT   Liver Enzymes No results for input(s): TP, ALB, TBIL, AP in the last 72 hours.     No lab exists for component: SGOT, GPT, DBIL   Digoxin    Thyroid Studies Lab Results   Component Value Date/Time    T4, Total 9.0 11/09/2017 10:28 AM    TSH 1.92 10/23/2018 10:05 AM          Signed By: Mere Lancaster NP     April 5, 2021

## 2021-04-05 NOTE — PROGRESS NOTES
Problem: Falls - Risk of  Goal: *Absence of Falls  Description: Document Yonis Shove Fall Risk and appropriate interventions in the flowsheet. Outcome: Progressing Towards Goal  Note: Fall Risk Interventions:  Mobility Interventions: Patient to call before getting OOB         Medication Interventions: Patient to call before getting OOB    Elimination Interventions: Call light in reach              Problem: Patient Education: Go to Patient Education Activity  Goal: Patient/Family Education  Outcome: Progressing Towards Goal     Problem: Pressure Injury - Risk of  Goal: *Prevention of pressure injury  Description: Document Efrem Scale and appropriate interventions in the flowsheet.   Outcome: Progressing Towards Goal  Note: Pressure Injury Interventions:  Sensory Interventions: Pad between skin to skin         Activity Interventions: Pressure redistribution bed/mattress(bed type)    Mobility Interventions: Pressure redistribution bed/mattress (bed type)    Nutrition Interventions: Document food/fluid/supplement intake                     Problem: Patient Education: Go to Patient Education Activity  Goal: Patient/Family Education  Outcome: Progressing Towards Goal

## 2021-04-05 NOTE — PROGRESS NOTES
0745: Bedside and Verbal shift change report given to writer by ANNE Kline SD staff nurse . Report included the following information ED Summary, Procedure Summary, Intake/Output, MAR, Accordion, Recent Results, Med Rec Status, Cardiac Rhythm ., Alarm Parameters , Quality Measures and Dual Neuro Assessment. Heparin infusion rate verify completed, APTT pending. 8351: Pt stated RLE 10/10 at rest, medicated for pain by Noel Gibson MAR. Right femoral site with small amount of serous drainage medial right femoral incision. Area cleansed with wound cleanser, dry dressing applied to site. 0848: Pt resting quietly, stated pain RLE \"better\", resting in bed with eyes closed. Pt callbell within reach. 0940: US RLE in progress, right femoral incision site dressing removed for procedure, will reapply Covaderm dressing when procedure complete. 0945: Pt called nurse, complaining of RLE pain at rest \"nerve pain, burning and aching constant pain\", pt medicated  For pain- see MAR. Dr. Dee Bello at pt bedside. 1015: Bedside and Verbal shift change report given to Elisabeth Mansfield RN by writer. Report included the following information OR Summary, Procedure Summary, Intake/Output, MAR, Accordion, Recent Results, Med Rec Status, Cardiac Rhythm ., Alarm Parameters , Quality Measures and Dual Neuro Assessment. APTT  Not resulted, night shift nurse and pt stated APTT drawn, Lab can not verify specimen sent. Phlebotomist called to draw APTT specimen by LACEY Moreno RN.

## 2021-04-06 LAB
ANION GAP SERPL CALC-SCNC: 8 MMOL/L (ref 3–18)
APTT PPP: 78.8 SEC (ref 23–36.4)
ATRIAL RATE: 197 BPM
BASOPHILS # BLD: 0 K/UL (ref 0–0.1)
BASOPHILS NFR BLD: 0 % (ref 0–2)
BUN SERPL-MCNC: 8 MG/DL (ref 7–18)
BUN/CREAT SERPL: 12 (ref 12–20)
CALCIUM SERPL-MCNC: 8.3 MG/DL (ref 8.5–10.1)
CALCULATED R AXIS, ECG10: 4 DEGREES
CALCULATED T AXIS, ECG11: -140 DEGREES
CHLORIDE SERPL-SCNC: 107 MMOL/L (ref 100–111)
CK MB CFR SERPL CALC: 0.3 % (ref 0–4)
CK MB SERPL-MCNC: 10.8 NG/ML (ref 5–25)
CK SERPL-CCNC: 4243 U/L (ref 39–308)
CO2 SERPL-SCNC: 24 MMOL/L (ref 21–32)
CREAT SERPL-MCNC: 0.65 MG/DL (ref 0.6–1.3)
DIAGNOSIS, 93000: NORMAL
DIFFERENTIAL METHOD BLD: ABNORMAL
EOSINOPHIL # BLD: 0 K/UL (ref 0–0.4)
EOSINOPHIL NFR BLD: 0 % (ref 0–5)
ERYTHROCYTE [DISTWIDTH] IN BLOOD BY AUTOMATED COUNT: 14.2 % (ref 11.6–14.5)
GLUCOSE SERPL-MCNC: 124 MG/DL (ref 74–99)
HCT VFR BLD AUTO: 26.7 % (ref 36–48)
HGB BLD-MCNC: 9.1 G/DL (ref 13–16)
LYMPHOCYTES # BLD: 1.5 K/UL (ref 0.9–3.6)
LYMPHOCYTES NFR BLD: 10 % (ref 21–52)
MAGNESIUM SERPL-MCNC: 2.2 MG/DL (ref 1.6–2.6)
MCH RBC QN AUTO: 30.5 PG (ref 24–34)
MCHC RBC AUTO-ENTMCNC: 34.1 G/DL (ref 31–37)
MCV RBC AUTO: 89.6 FL (ref 74–97)
MONOCYTES # BLD: 2 K/UL (ref 0.05–1.2)
MONOCYTES NFR BLD: 13 % (ref 3–10)
NEUTS SEG # BLD: 11.4 K/UL (ref 1.8–8)
NEUTS SEG NFR BLD: 77 % (ref 40–73)
PLATELET # BLD AUTO: 410 K/UL (ref 135–420)
PMV BLD AUTO: 9.2 FL (ref 9.2–11.8)
POTASSIUM SERPL-SCNC: 3.8 MMOL/L (ref 3.5–5.5)
PROCALCITONIN SERPL-MCNC: 0.43 NG/ML
Q-T INTERVAL, ECG07: 240 MS
QRS DURATION, ECG06: 88 MS
QTC CALCULATION (BEZET), ECG08: 404 MS
RBC # BLD AUTO: 2.98 M/UL (ref 4.7–5.5)
SODIUM SERPL-SCNC: 139 MMOL/L (ref 136–145)
TROPONIN I SERPL-MCNC: <0.02 NG/ML (ref 0–0.04)
VENTRICULAR RATE, ECG03: 171 BPM
WBC # BLD AUTO: 15 K/UL (ref 4.6–13.2)

## 2021-04-06 PROCEDURE — 83735 ASSAY OF MAGNESIUM: CPT

## 2021-04-06 PROCEDURE — 74011250637 HC RX REV CODE- 250/637: Performed by: INTERNAL MEDICINE

## 2021-04-06 PROCEDURE — 74011250636 HC RX REV CODE- 250/636: Performed by: NURSE PRACTITIONER

## 2021-04-06 PROCEDURE — 84145 PROCALCITONIN (PCT): CPT

## 2021-04-06 PROCEDURE — 80048 BASIC METABOLIC PNL TOTAL CA: CPT

## 2021-04-06 PROCEDURE — 74011250636 HC RX REV CODE- 250/636: Performed by: SURGERY

## 2021-04-06 PROCEDURE — 99233 SBSQ HOSP IP/OBS HIGH 50: CPT | Performed by: INTERNAL MEDICINE

## 2021-04-06 PROCEDURE — 36415 COLL VENOUS BLD VENIPUNCTURE: CPT

## 2021-04-06 PROCEDURE — 85730 THROMBOPLASTIN TIME PARTIAL: CPT

## 2021-04-06 PROCEDURE — 74011000250 HC RX REV CODE- 250: Performed by: NURSE PRACTITIONER

## 2021-04-06 PROCEDURE — 65660000004 HC RM CVT STEPDOWN

## 2021-04-06 PROCEDURE — 2709999900 HC NON-CHARGEABLE SUPPLY

## 2021-04-06 PROCEDURE — 85025 COMPLETE CBC W/AUTO DIFF WBC: CPT

## 2021-04-06 PROCEDURE — 74011000258 HC RX REV CODE- 258: Performed by: NURSE PRACTITIONER

## 2021-04-06 PROCEDURE — 74011250637 HC RX REV CODE- 250/637: Performed by: SURGERY

## 2021-04-06 RX ORDER — DIGOXIN 0.25 MG/ML
250 INJECTION INTRAMUSCULAR; INTRAVENOUS
Status: COMPLETED | OUTPATIENT
Start: 2021-04-06 | End: 2021-04-06

## 2021-04-06 RX ORDER — METOPROLOL TARTRATE 50 MG/1
50 TABLET ORAL 2 TIMES DAILY
Status: DISCONTINUED | OUTPATIENT
Start: 2021-04-06 | End: 2021-04-12 | Stop reason: HOSPADM

## 2021-04-06 RX ORDER — DIGOXIN 0.25 MG/ML
500 INJECTION INTRAMUSCULAR; INTRAVENOUS
Status: COMPLETED | OUTPATIENT
Start: 2021-04-06 | End: 2021-04-06

## 2021-04-06 RX ADMIN — LOSARTAN POTASSIUM 100 MG: 50 TABLET, FILM COATED ORAL at 08:01

## 2021-04-06 RX ADMIN — OXYCODONE HYDROCHLORIDE AND ACETAMINOPHEN 2 TABLET: 5; 325 TABLET ORAL at 02:53

## 2021-04-06 RX ADMIN — Medication 10 ML: at 15:51

## 2021-04-06 RX ADMIN — THERA TABS 1 TABLET: TAB at 08:01

## 2021-04-06 RX ADMIN — HYDROMORPHONE HYDROCHLORIDE 1 MG: 1 INJECTION, SOLUTION INTRAMUSCULAR; INTRAVENOUS; SUBCUTANEOUS at 14:38

## 2021-04-06 RX ADMIN — DIGOXIN 500 MCG: 0.25 INJECTION INTRAMUSCULAR; INTRAVENOUS at 00:50

## 2021-04-06 RX ADMIN — FUROSEMIDE 20 MG: 20 TABLET ORAL at 08:01

## 2021-04-06 RX ADMIN — Medication 10 ML: at 06:00

## 2021-04-06 RX ADMIN — DEXTROSE MONOHYDRATE AND SODIUM CHLORIDE 100 ML/HR: 5; .45 INJECTION, SOLUTION INTRAVENOUS at 18:17

## 2021-04-06 RX ADMIN — METOPROLOL TARTRATE 25 MG: 25 TABLET, FILM COATED ORAL at 06:30

## 2021-04-06 RX ADMIN — SODIUM CHLORIDE 15 MG/HR: 900 INJECTION, SOLUTION INTRAVENOUS at 21:44

## 2021-04-06 RX ADMIN — DIGOXIN 250 MCG: 0.25 INJECTION INTRAMUSCULAR; INTRAVENOUS at 04:29

## 2021-04-06 RX ADMIN — OXYCODONE HYDROCHLORIDE AND ACETAMINOPHEN 2 TABLET: 5; 325 TABLET ORAL at 17:20

## 2021-04-06 RX ADMIN — GABAPENTIN 400 MG: 300 CAPSULE ORAL at 08:01

## 2021-04-06 RX ADMIN — GABAPENTIN 400 MG: 300 CAPSULE ORAL at 17:20

## 2021-04-06 RX ADMIN — SODIUM CHLORIDE 15 MG/HR: 900 INJECTION, SOLUTION INTRAVENOUS at 14:37

## 2021-04-06 RX ADMIN — ATORVASTATIN CALCIUM 80 MG: 40 TABLET, FILM COATED ORAL at 21:44

## 2021-04-06 RX ADMIN — OXYCODONE HYDROCHLORIDE AND ACETAMINOPHEN 2 TABLET: 5; 325 TABLET ORAL at 08:04

## 2021-04-06 RX ADMIN — OXYCODONE HYDROCHLORIDE AND ACETAMINOPHEN 2 TABLET: 5; 325 TABLET ORAL at 11:50

## 2021-04-06 RX ADMIN — GABAPENTIN 400 MG: 300 CAPSULE ORAL at 21:44

## 2021-04-06 RX ADMIN — METOPROLOL TARTRATE 50 MG: 50 TABLET, FILM COATED ORAL at 17:20

## 2021-04-06 RX ADMIN — Medication 10 ML: at 21:45

## 2021-04-06 RX ADMIN — ASPIRIN 81 MG CHEWABLE TABLET 81 MG: 81 TABLET CHEWABLE at 08:01

## 2021-04-06 NOTE — PROGRESS NOTES
Infectious Disease progress note        Reason: Possible infectious process    Current abx Prior abx         Lines: PIV      Assessment :    24-year-old male with history of CAD status post stenting of circumflex and RCA in 2017, HLD, peripheral vascular disease status post left AKA and intermittent right lower extremity claudication who was admitted on 3/31 to vascular surgery service with right calf and leg pain:    1-Persistent leukocytosis: Possibly reactive/ post procedure-less likely an infectious process (with no respiratory, GI,  or incision area issues)-15.9K today (T-max 100.4 the day after procedure 4/3)-afebrile now  -Blood culture 4/4 x 1 set negative at 2 days  -Urine culture 4/4 negative   -Chest x-ray unremarkable for any acute findings    2-Right pretibial erythema and warmth-patient reports it was present before but slightly more now after procedure  -Quite tender, and due to recent infarct/blockage  -Negative right lower extremity venous Doppler DVT    3-Right lower extremity bypass occlusion  -S/P R thrombectomy with axillofemoral bypass, femoral popliteal bypass, revision of right femoral artery and patch angioplasty, right leg angiogram with balloon angioplasty of right popliteal artery- 4/2    Lab Results   Component Value Date/Time    Hemoglobin A1c 5.2 12/29/2015 07:18 AM         Recommendations:  Continue to monitor off antibiotics  Will follow up on blood cultures in progress  Anticoagulation as well as antiplatelet therapy per cardiology and vascular  Continue supportive care      Thank you for consultation request. Above plan was discussed in details with patient. Please call me if any further questions or concerns. Will continue to participate in the care of this patient. HPI:  Mr. Pierre Schaefer reports no new issues. While laying in bed he does not have back pain. Less swelling and redness in his right pretibial and lower leg area today. Denies any fevers, chills.           Current Facility-Administered Medications   Medication Dose Route Frequency    dilTIAZem (CARDIZEM) 100 mg in 0.9% sodium chloride (MBP/ADV) 100 mL infusion  15 mg/hr IntraVENous CONTINUOUS    heparin 25,000 units in D5W 250 ml infusion  12-25 Units/kg/hr IntraVENous TITRATE    dextrose 5 % - 0.45% NaCl infusion  100 mL/hr IntraVENous CONTINUOUS    HYDROmorphone (DILAUDID) syringe 0.5-1 mg  0.5-1 mg IntraVENous Q2H PRN    sodium chloride (NS) flush 5-40 mL  5-40 mL IntraVENous Q8H    sodium chloride (NS) flush 5-40 mL  5-40 mL IntraVENous PRN    aspirin chewable tablet 81 mg  81 mg Oral DAILY    atorvastatin (LIPITOR) tablet 80 mg  80 mg Oral QHS    furosemide (LASIX) tablet 20 mg  20 mg Oral DAILY    gabapentin (NEURONTIN) capsule 400 mg  400 mg Oral TID    losartan (COZAAR) tablet 100 mg  100 mg Oral DAILY    metoprolol tartrate (LOPRESSOR) tablet 25 mg  25 mg Oral BID    therapeutic multivitamin (THERAGRAN) tablet 1 Tab  1 Tab Oral DAILY    oxyCODONE-acetaminophen (PERCOCET) 5-325 mg per tablet 1-2 Tab  1-2 Tab Oral Q4H PRN     Temp (24hrs), Av.6 °F (37 °C), Min:98.3 °F (36.8 °C), Max:99.1 °F (37.3 °C)    Visit Vitals  /64   Pulse (!) 137   Temp 98.5 °F (36.9 °C)   Resp 20   Ht 5' 10\" (1.778 m)   Wt 87.9 kg (193 lb 12.8 oz)   SpO2 96%   BMI 27.81 kg/m²       ROS: 12 point ROS obtained in details. Pertinent positives as mentioned in HPI,   otherwise negative    Physical Exam:    General: Well developed, well nourished male laying on the bed/sitting on the  bed AAOx3 in no acute distress. General:   awake alert and oriented   HEENT:  Normocephalic, atraumatic, PERRL, EOMI, no scleral icterus or pallor; no conjunctival hemmohage;  nasal and oral mucous are moist and without evidence of lesions. No thrush. Dentition good. Neck supple, no bruits.    Lymph Nodes:   no cervical, axillary or inguinal adenopathy   Lungs:   non-labored, bilaterally clear to auscultation- no crackles wheezes rales or rhonchi   Heart:  RRR, s1 and s2; no murmurs rubs or gallops, no edema, + pedal pulses   Abdomen:  soft, non-distended, active bowel sounds,  Non-tender   Genitourinary:  deferred   Extremities:  no joint effusions or swelling; Full ROM of all large joints to the upper  extremities; muscle mass appropriate for age  Area of improving erythema in the right pretibial area-slightly less swelling today the heel and foot are also erythematous and the heel is sensitive to touch. Status post left AKA-stump well-healed   Neurologic:  No gross focal sensory abnormalities; 5/5 muscle strength to upper and lower extremities. Speech appropriate. Cranial nerves intact                        Skin:  No rash or ulcers noted   Wound:       Back:  no spinal or paraspinal muscle tenderness or rigidity, no CVA tenderness     Psychiatric:  No suicidal or homicidal ideations, appropriate mood and affect         Labs: Results:   Chemistry Recent Labs     04/06/21  0523   *      K 3.8      CO2 24   BUN 8   CREA 0.65   CA 8.3*   AGAP 8   BUCR 12      CBC w/Diff Recent Labs     04/06/21  0523 04/05/21  0525 04/04/21  0520   WBC 15.0* 15.9* 14.3*   RBC 2.98* 3.01* 3.01*   HGB 9.1* 9.3* 9.3*   HCT 26.7* 27.1* 27.5*    325 270   GRANS 77* 79*  --    LYMPH 10* 9*  --    EOS 0 0  --       Microbiology Recent Labs     04/04/21  1241 04/04/21  1220   CULT NO GROWTH 2 DAYS No growth (<1,000 CFU/ML)          RADIOLOGY:    All available imaging studies/reports in [facility] for this admission were reviewed      Disclaimer: Sections of this note are dictated utilizing voice recognition software, which may have resulted in some phonetic based errors in grammar and contents. Even though attempts were made to correct all the mistakes, some may have been missed, and remained in the body of the document. If questions arise, please contact our department.     Dr. Ovidio Boyle, Infectious Disease Specialist  [phone]  April 6, 2021  4:44 PM

## 2021-04-06 NOTE — PROGRESS NOTES
[facility]  -  Progress Note      Patient: Monica Green MRN: [id number]  SSN: [id number]    YOB: 1960  Age: 59 y.o. Sex: male      Admit Date: 3/31/2021    Assessment:     -Presented for angiography R leg due to R leg arterial occlusion - occluded R leg bypass graft.  Hx peripheral vascular disease s/p left AKA. -S/p thrombectomy R axillofemoral bypass, thrombectomy R femorpopliteal bypass, revision R femoral artery with patch angioplasty by Dr. Claudene Rook 4/1/2021  -S/p thrombectomy R leg femoropopliteal bypass and tibial runoff by Dr. Claudene Rook 4/2/2021.  -Consulted due to abnormal EKG, preop  -Paroxysmal atrial fibrillation, historically on Coumadin for anticoagulation, s/p BERRY/cardioversion 4/18/2017  -Hx Nonischemic cardiomyopathy  -Echo 10/2017 with findings as follows: EF 55%, hypokinesis of basal inferior walls, normal LV wall thickness, normal diastolic function, mildly dilated RA, trivial mitral regurgitation  -CAD, cardiac cath 3/8/2017 with findings as follows:  · EF around 30%, no diagnostic segmental wall motion abnormality  · LM: Diffuse 30%  · LAD: Diffuse 30%  · Diagonal branches: Diffuse 30%  · Cx: Proximal 70%; trifurcation healed ruptured plaque with residual 90% (RYLEE 3)  · OM branches: Angiographically normal  · RCA: Diffuse 50% with focal mid 90%  -S/p successful stenting of Cx and RCA to 0% during subsequent cardiac cath 3/15/2017- stable no anginal symptoms   -Nuclear stress test 11/2020 was low-risk, EF 51%  -Dyslipidemia     Previously followed by Dr. Rebecca Singh, first appointment with Dr. Samara Blankenship 4/27/2021    Plan:     -Pt with afib on telemetry overnight with rates up to ~200, started on Cardizem infusion overnight, also given IV Digoxin 500 mcg x 1. Remains in afib with rates low 100's, continue current management.  -Continued on Heparin infusion for anticoagulation, would recommend switch to oral anticoagulation by time of discharge.   -ID following, appreciate assistance. Staff addendum:   A.fib/RVR noted overnight, now on diltiazem drip 15mg/hr. I will increase lopressor to 50 mg BID, digoxin given last night, on heparin drip. If unable to control overnight, reasonable to start amiodarone since new onset. NOAC or coumadin if ok with surgery and if no further planned procedures. I saw, examined, and evaluated the patient. I personally reviewed the patient's labs, tests, vitals, orders, medications, updated history, and other providers assessments. I personally agree with the findings as stated and the plan as documented. Winnie Melendez MD    Subjective:     No new complaints. Objective:      Patient Vitals for the past 8 hrs:   Temp Pulse Resp BP SpO2   04/06/21 1110 98.3 °F (36.8 °C) (!) 102 20 113/74 98 %   04/06/21 0756 98.5 °F (36.9 °C) (!) 137 20 130/64 96 %   04/06/21 0452 98.4 °F (36.9 °C) (!) 136 20 117/83 98 %         Patient Vitals for the past 96 hrs:   Weight   04/06/21 0452 87.9 kg (193 lb 12.8 oz)   04/05/21 0400 88.2 kg (194 lb 6.4 oz)   04/04/21 0427 89.7 kg (197 lb 12.8 oz)   04/03/21 0619 94.3 kg (208 lb)         Intake/Output Summary (Last 24 hours) at 4/6/2021 1118  Last data filed at 4/6/2021 0959  Gross per 24 hour   Intake 720 ml   Output 2225 ml   Net -1505 ml       Physical Exam:  General:  alert, cooperative, no distress, appears stated age  Neck:  supple  Lungs:  clear to auscultation bilaterally  Heart:  Irregularly irregular rhythm  Abdomen:  abdomen is sot without significant tenderness, masses, organomegaly or guarding  Extremities:  L AKA. RLE with mild edema and erythema.      Data Review:     Labs: Results:       Chemistry Recent Labs     04/06/21 0523   *      K 3.8      CO2 24   BUN 8   CREA 0.65   CA 8.3*   MG 2.2   AGAP 8   BUCR 12      CBC w/Diff Recent Labs     04/06/21 0523 04/05/21  0525 04/04/21  0520   WBC 15.0* 15.9* 14.3*   RBC 2.98* 3.01* 3.01*   HGB 9.1* 9.3* 9.3* HCT 26.7* 27.1* 27.5*    325 270   GRANS 77* 79*  --    LYMPH 10* 9*  --    EOS 0 0  --       Cardiac Enzymes Lab Results   Component Value Date/Time    CPK 4,243 (H) 04/05/2021 10:50 PM    CKMB 10.8 (H) 04/05/2021 10:50 PM    CKND1 0.3 04/05/2021 10:50 PM    TROIQ <0.02 04/05/2021 10:50 PM      Coagulation Recent Labs     04/06/21  0923 04/05/21  1101   APTT 78.8* 97.4*       Lipid Panel Lab Results   Component Value Date/Time    Cholesterol, total 105 09/08/2020 07:09 AM    HDL Cholesterol 26 (L) 09/08/2020 07:09 AM    LDL, calculated 60.4 09/08/2020 07:09 AM    VLDL, calculated 18.6 09/08/2020 07:09 AM    Triglyceride 93 09/08/2020 07:09 AM    CHOL/HDL Ratio 4.0 09/08/2020 07:09 AM      Thyroid Studies Lab Results   Component Value Date/Time    T4, Total 9.0 11/09/2017 10:28 AM    TSH 1.92 10/23/2018 10:05 AM

## 2021-04-07 LAB
APTT PPP: 128.9 SEC (ref 23–36.4)
APTT PPP: 54.3 SEC (ref 23–36.4)
APTT PPP: >180 SEC (ref 23–36.4)
BASOPHILS # BLD: 0 K/UL (ref 0–0.1)
BASOPHILS # BLD: 0.1 K/UL (ref 0–0.1)
BASOPHILS NFR BLD: 0 % (ref 0–2)
BASOPHILS NFR BLD: 0 % (ref 0–2)
DIFFERENTIAL METHOD BLD: ABNORMAL
DIFFERENTIAL METHOD BLD: ABNORMAL
EOSINOPHIL # BLD: 0.1 K/UL (ref 0–0.4)
EOSINOPHIL # BLD: 0.1 K/UL (ref 0–0.4)
EOSINOPHIL NFR BLD: 1 % (ref 0–5)
EOSINOPHIL NFR BLD: 1 % (ref 0–5)
ERYTHROCYTE [DISTWIDTH] IN BLOOD BY AUTOMATED COUNT: 14.2 % (ref 11.6–14.5)
ERYTHROCYTE [DISTWIDTH] IN BLOOD BY AUTOMATED COUNT: 14.3 % (ref 11.6–14.5)
HCT VFR BLD AUTO: 23.1 % (ref 36–48)
HCT VFR BLD AUTO: 24 % (ref 36–48)
HGB BLD-MCNC: 7.9 G/DL (ref 13–16)
HGB BLD-MCNC: 8.2 G/DL (ref 13–16)
LYMPHOCYTES # BLD: 1.6 K/UL (ref 0.9–3.6)
LYMPHOCYTES # BLD: 2.3 K/UL (ref 0.9–3.6)
LYMPHOCYTES NFR BLD: 13 % (ref 21–52)
LYMPHOCYTES NFR BLD: 17 % (ref 21–52)
MCH RBC QN AUTO: 30.9 PG (ref 24–34)
MCH RBC QN AUTO: 31.5 PG (ref 24–34)
MCHC RBC AUTO-ENTMCNC: 34.2 G/DL (ref 31–37)
MCHC RBC AUTO-ENTMCNC: 34.2 G/DL (ref 31–37)
MCV RBC AUTO: 90.6 FL (ref 74–97)
MCV RBC AUTO: 92 FL (ref 74–97)
MONOCYTES # BLD: 1.4 K/UL (ref 0.05–1.2)
MONOCYTES # BLD: 1.4 K/UL (ref 0.05–1.2)
MONOCYTES NFR BLD: 10 % (ref 3–10)
MONOCYTES NFR BLD: 12 % (ref 3–10)
NEUTS SEG # BLD: 9.1 K/UL (ref 1.8–8)
NEUTS SEG # BLD: 9.7 K/UL (ref 1.8–8)
NEUTS SEG NFR BLD: 71 % (ref 40–73)
NEUTS SEG NFR BLD: 74 % (ref 40–73)
PLATELET # BLD AUTO: 421 K/UL (ref 135–420)
PLATELET # BLD AUTO: 434 K/UL (ref 135–420)
PMV BLD AUTO: 9.5 FL (ref 9.2–11.8)
PMV BLD AUTO: 9.5 FL (ref 9.2–11.8)
RBC # BLD AUTO: 2.51 M/UL (ref 4.35–5.65)
RBC # BLD AUTO: 2.65 M/UL (ref 4.35–5.65)
WBC # BLD AUTO: 12.3 K/UL (ref 4.6–13.2)
WBC # BLD AUTO: 13.7 K/UL (ref 4.6–13.2)

## 2021-04-07 PROCEDURE — 74011250636 HC RX REV CODE- 250/636: Performed by: SURGERY

## 2021-04-07 PROCEDURE — 74011250636 HC RX REV CODE- 250/636: Performed by: NURSE PRACTITIONER

## 2021-04-07 PROCEDURE — 74011000258 HC RX REV CODE- 258: Performed by: NURSE PRACTITIONER

## 2021-04-07 PROCEDURE — 65660000004 HC RM CVT STEPDOWN

## 2021-04-07 PROCEDURE — 85730 THROMBOPLASTIN TIME PARTIAL: CPT

## 2021-04-07 PROCEDURE — 99232 SBSQ HOSP IP/OBS MODERATE 35: CPT | Performed by: INTERNAL MEDICINE

## 2021-04-07 PROCEDURE — 74011000250 HC RX REV CODE- 250: Performed by: NURSE PRACTITIONER

## 2021-04-07 PROCEDURE — 74011250637 HC RX REV CODE- 250/637: Performed by: INTERNAL MEDICINE

## 2021-04-07 PROCEDURE — 36415 COLL VENOUS BLD VENIPUNCTURE: CPT

## 2021-04-07 PROCEDURE — 85025 COMPLETE CBC W/AUTO DIFF WBC: CPT

## 2021-04-07 PROCEDURE — 74011250637 HC RX REV CODE- 250/637: Performed by: SURGERY

## 2021-04-07 PROCEDURE — 74011250637 HC RX REV CODE- 250/637: Performed by: PHYSICIAN ASSISTANT

## 2021-04-07 RX ORDER — DILTIAZEM HYDROCHLORIDE 60 MG/1
60 TABLET, FILM COATED ORAL
Status: DISCONTINUED | OUTPATIENT
Start: 2021-04-07 | End: 2021-04-08

## 2021-04-07 RX ORDER — HEPARIN SODIUM 1000 [USP'U]/ML
80 INJECTION, SOLUTION INTRAVENOUS; SUBCUTANEOUS ONCE
Status: COMPLETED | OUTPATIENT
Start: 2021-04-07 | End: 2021-04-07

## 2021-04-07 RX ADMIN — GABAPENTIN 400 MG: 300 CAPSULE ORAL at 17:09

## 2021-04-07 RX ADMIN — OXYCODONE HYDROCHLORIDE AND ACETAMINOPHEN 2 TABLET: 5; 325 TABLET ORAL at 03:14

## 2021-04-07 RX ADMIN — Medication 10 ML: at 21:35

## 2021-04-07 RX ADMIN — OXYCODONE HYDROCHLORIDE AND ACETAMINOPHEN 1 TABLET: 5; 325 TABLET ORAL at 21:50

## 2021-04-07 RX ADMIN — FUROSEMIDE 20 MG: 20 TABLET ORAL at 08:11

## 2021-04-07 RX ADMIN — Medication 10 ML: at 13:11

## 2021-04-07 RX ADMIN — OXYCODONE HYDROCHLORIDE AND ACETAMINOPHEN 2 TABLET: 5; 325 TABLET ORAL at 17:09

## 2021-04-07 RX ADMIN — HEPARIN SODIUM 7130 UNITS: 1000 INJECTION INTRAVENOUS; SUBCUTANEOUS at 14:46

## 2021-04-07 RX ADMIN — GABAPENTIN 400 MG: 300 CAPSULE ORAL at 08:11

## 2021-04-07 RX ADMIN — METOPROLOL TARTRATE 50 MG: 50 TABLET, FILM COATED ORAL at 17:09

## 2021-04-07 RX ADMIN — ATORVASTATIN CALCIUM 80 MG: 40 TABLET, FILM COATED ORAL at 21:33

## 2021-04-07 RX ADMIN — HYDROMORPHONE HYDROCHLORIDE 1 MG: 1 INJECTION, SOLUTION INTRAMUSCULAR; INTRAVENOUS; SUBCUTANEOUS at 20:01

## 2021-04-07 RX ADMIN — SODIUM CHLORIDE 15 MG/HR: 900 INJECTION, SOLUTION INTRAVENOUS at 05:20

## 2021-04-07 RX ADMIN — ASPIRIN 81 MG CHEWABLE TABLET 81 MG: 81 TABLET CHEWABLE at 08:11

## 2021-04-07 RX ADMIN — LOSARTAN POTASSIUM 100 MG: 50 TABLET, FILM COATED ORAL at 08:11

## 2021-04-07 RX ADMIN — OXYCODONE HYDROCHLORIDE AND ACETAMINOPHEN 2 TABLET: 5; 325 TABLET ORAL at 08:11

## 2021-04-07 RX ADMIN — METOPROLOL TARTRATE 50 MG: 50 TABLET, FILM COATED ORAL at 08:11

## 2021-04-07 RX ADMIN — DILTIAZEM HYDROCHLORIDE 60 MG: 60 TABLET, FILM COATED ORAL at 11:48

## 2021-04-07 RX ADMIN — THERA TABS 1 TABLET: TAB at 08:11

## 2021-04-07 RX ADMIN — GABAPENTIN 400 MG: 300 CAPSULE ORAL at 21:33

## 2021-04-07 RX ADMIN — OXYCODONE HYDROCHLORIDE AND ACETAMINOPHEN 2 TABLET: 5; 325 TABLET ORAL at 11:48

## 2021-04-07 RX ADMIN — DILTIAZEM HYDROCHLORIDE 60 MG: 60 TABLET, FILM COATED ORAL at 17:09

## 2021-04-07 RX ADMIN — Medication 10 ML: at 06:00

## 2021-04-07 RX ADMIN — DEXTROSE MONOHYDRATE AND SODIUM CHLORIDE 100 ML/HR: 5; .45 INJECTION, SOLUTION INTRAVENOUS at 05:20

## 2021-04-07 NOTE — PROGRESS NOTES
Cardiology Progress Note      Patient: Kaylah Francois MRN: [id number]  SSN: [id number]    YOB: 1960  Age: 59 y.o. Sex: male      Admit Date: 3/31/2021    Attending cardiologist: Dr. Shane Shah:     -Presented for angiography R leg due to R leg arterial occlusion - occluded R leg bypass graft.  Hx peripheral vascular disease s/p left AKA. -S/p thrombectomy R axillofemoral bypass, thrombectomy R femorpopliteal bypass, revision R femoral artery with patch angioplasty by Dr. Johnathan Fraga 4/1/2021  -S/p thrombectomy R leg femoropopliteal bypass and tibial runoff by Dr. Johnathan Fraga 4/2/2021.  -Consulted due to abnormal EKG, preop  -Paroxysmal atrial fibrillation, historically on Coumadin for anticoagulation, s/p BERRY/cardioversion 4/18/2017  -Hx Nonischemic cardiomyopathy  -Echo 10/2017 with findings as follows: EF 55%, hypokinesis of basal inferior walls, normal LV wall thickness, normal diastolic function, mildly dilated RA, trivial mitral regurgitation  -CAD, cardiac cath 3/8/2017 with findings as follows:  · EF around 30%, no diagnostic segmental wall motion abnormality  · LM: Diffuse 30%  · LAD: Diffuse 30%  · Diagonal branches: Diffuse 30%  · Cx: Proximal 70%; trifurcation healed ruptured plaque with residual 90% (RYLEE 3)  · OM branches: Angiographically normal  · RCA: Diffuse 50% with focal mid 90%  -S/p successful stenting of Cx and RCA to 0% during subsequent cardiac cath 3/15/2017- stable no anginal symptoms   -Nuclear stress test 11/2020 was low-risk, EF 51%  -Dyslipidemia     Previously followed by Dr. Radha Burt, first appointment with Dr. Abran Lennox 4/27/2021    Plan:     Seen and evaluated. Agree with below. Patient has long history of PAF. Would continue rate control as needed. When able to take oral anticoagulation, would resume.   No new cardiac recommendations at this time.    -Will decrease Cardizem infusion to 10 mg/hr, and start Cardizem IR with plan to wean off Cardizem infusion over next 24 hours. Remains in afib with rates currently 60's-70's. Continued on PO Lopressor, dose increased yesterday evening.  -Continued on Heparin infusion for anticoagulation, would recommend switch to oral anticoagulation when okay from vascular surgery standpoint. Subjective:     No new complaints.       Objective:      Patient Vitals for the past 8 hrs:   Temp Pulse Resp BP SpO2   04/07/21 0742 98.3 °F (36.8 °C) 88 18 127/74 99 %   04/07/21 0532 98 °F (36.7 °C) 89 18 119/70 95 %         Patient Vitals for the past 96 hrs:   Weight   04/07/21 0617 89.1 kg (196 lb 6.4 oz)   04/06/21 0452 87.9 kg (193 lb 12.8 oz)   04/05/21 0400 88.2 kg (194 lb 6.4 oz)   04/04/21 0427 89.7 kg (197 lb 12.8 oz)         Intake/Output Summary (Last 24 hours) at 4/7/2021 9171  Last data filed at 4/7/2021 9048  Gross per 24 hour   Intake 960 ml   Output 1150 ml   Net -190 ml       Physical Exam:  General:  alert, cooperative, no distress, appears stated age  Neck:  supple  Lungs:  clear to auscultation bilaterally  Heart:  Irregularly irregular rhythm  Abdomen:  abdomen is soft without significant tenderness, masses, organomegaly or guarding  Extremities:  L AKA, RLE with mild erythema and edema    Data Review:     Labs: Results:       Chemistry Recent Labs     04/06/21  0523   *      K 3.8      CO2 24   BUN 8   CREA 0.65   CA 8.3*   MG 2.2   AGAP 8   BUCR 12      CBC w/Diff Recent Labs     04/07/21  0515 04/06/21  0523 04/05/21  0525   WBC 12.3 15.0* 15.9*   RBC 2.65* 2.98* 3.01*   HGB 8.2* 9.1* 9.3*   HCT 24.0* 26.7* 27.1*   * 410 325   GRANS 74* 77* 79*   LYMPH 13* 10* 9*   EOS 1 0 0      Coagulation Recent Labs     04/07/21  0418 04/06/21  0923   APTT >180.0* 78.8*       Lipid Panel Lab Results   Component Value Date/Time    Cholesterol, total 105 09/08/2020 07:09 AM    HDL Cholesterol 26 (L) 09/08/2020 07:09 AM    LDL, calculated 60.4 09/08/2020 07:09 AM    VLDL, calculated 18.6 09/08/2020 07:09 AM    Triglyceride 93 09/08/2020 07:09 AM    CHOL/HDL Ratio 4.0 09/08/2020 07:09 AM      Thyroid Studies Lab Results   Component Value Date/Time    T4, Total 9.0 11/09/2017 10:28 AM    TSH 1.92 10/23/2018 10:05 AM

## 2021-04-07 NOTE — PROGRESS NOTES
1938 - Bedside shift change report given to Joseph Fairchild (oncoming nurse) by Mendy Avina (offgoing nurse). Report included the following information SBAR, Kardex, ED Summary, OR Summary, Procedure Summary, Intake/Output, MAR, Recent Results and Cardiac Rhythm Afib.     0430 - Phlebotomist at bedside. PTT drawn. Dressing to right femoral changed with sterile gauze and wrapped in Covaderm. Cleansed with two CHG swabs. 46 - Kaylie from lab called in critical PTT of >180. Heparin held. 3089 - Bedside shift change report given to Mendy Avina (oncoming nurse) by Joseph Fairchild (offgoing nurse). Report included the following information SBAR, MAR and Recent Results.

## 2021-04-07 NOTE — PROGRESS NOTES
Infectious Disease progress note        Reason: Possible infectious process    Current abx Prior abx         Lines: PIV      Assessment :    25-year-old male with history of CAD status post stenting of circumflex and RCA in 2017, HLD, peripheral vascular disease status post left AKA and intermittent right lower extremity claudication who was admitted on 3/31 to vascular surgery service with right calf and leg pain:    1-Persistent leukocytosis: Possibly reactive/ post procedure-less likely an infectious process (with no respiratory, GI,  or incision area issues)-15.9K today (T-max 100.4 the day after procedure 4/3)-afebrile now  --> WBC down to 12 K  -Blood culture 4/4 x 1 set negative at 3 days  -Urine culture 4/4 negative   -Chest x-ray unremarkable for any acute findings    2-Right pretibial erythema and warmth-patient reports it was present before but slightly more now after procedure- Improving  -Quite tender, and due to recent infarct/blockage  -Negative right lower extremity venous Doppler DVT    3-Right lower extremity bypass occlusion  -S/P R thrombectomy with axillofemoral bypass, femoral popliteal bypass, revision of right femoral artery and patch angioplasty, right leg angiogram with balloon angioplasty of right popliteal artery- 4/2      Recommendations:    Continue to monitor off antibiotics  Anticoagulation as well as antiplatelet therapy per cardiology and vascular  Continue supportive care    Please call back if any further questions    HPI:  Denies any fevers, chills. Feels like the pain and redness in his leg has gotten better slowly. No pain or issues with the groin incision.           Current Facility-Administered Medications   Medication Dose Route Frequency    metoprolol tartrate (LOPRESSOR) tablet 50 mg  50 mg Oral BID    dilTIAZem (CARDIZEM) 100 mg in 0.9% sodium chloride (MBP/ADV) 100 mL infusion  15 mg/hr IntraVENous CONTINUOUS    heparin 25,000 units in D5W 250 ml infusion  12-25 Units/kg/hr IntraVENous TITRATE    dextrose 5 % - 0.45% NaCl infusion  100 mL/hr IntraVENous CONTINUOUS    HYDROmorphone (DILAUDID) syringe 0.5-1 mg  0.5-1 mg IntraVENous Q2H PRN    sodium chloride (NS) flush 5-40 mL  5-40 mL IntraVENous Q8H    sodium chloride (NS) flush 5-40 mL  5-40 mL IntraVENous PRN    aspirin chewable tablet 81 mg  81 mg Oral DAILY    atorvastatin (LIPITOR) tablet 80 mg  80 mg Oral QHS    furosemide (LASIX) tablet 20 mg  20 mg Oral DAILY    gabapentin (NEURONTIN) capsule 400 mg  400 mg Oral TID    losartan (COZAAR) tablet 100 mg  100 mg Oral DAILY    therapeutic multivitamin (THERAGRAN) tablet 1 Tab  1 Tab Oral DAILY    oxyCODONE-acetaminophen (PERCOCET) 5-325 mg per tablet 1-2 Tab  1-2 Tab Oral Q4H PRN     Temp (24hrs), Av.4 °F (36.9 °C), Min:98 °F (36.7 °C), Max:99.1 °F (37.3 °C)    Visit Vitals  /74 (BP 1 Location: Left upper arm, BP Patient Position: At rest)   Pulse 88   Temp 98.3 °F (36.8 °C)   Resp 18   Ht 5' 10\" (1.778 m)   Wt 89.1 kg (196 lb 6.4 oz)   SpO2 99%   BMI 28.18 kg/m²       ROS: 12 point ROS obtained in details. Pertinent positives as mentioned in HPI,   otherwise negative    Physical Exam:    General: Well developed, well nourished male laying on the bed/sitting on the  bed AAOx3 in no acute distress. General:   awake alert and oriented   HEENT:  Normocephalic, atraumatic,       Lungs:   non-labored, bilaterally clear to auscultation- no crackles wheezes rales or rhonchi   Heart:  RRR, s1 and s2; no murmurs rubs or gallops   Abdomen:  soft, non-distended, active bowel sounds,  Non-tender   Genitourinary:  deferred   Extremities:  no joint effusions or swelling; muscle mass appropriate for age  Area of improving erythema in the right pretibial area-slightly less swelling today the heel and foot are also erythematous and the heel is sensitive to touch.   Status post left AKA-stump well-healed   Neurologic:  No gross focal sensory abnormalities; Skin:  No rash or ulcers noted   Wound:       Back:  Not examined     Psychiatric:  No suicidal or homicidal ideations, appropriate mood and affect         Labs: Results:   Chemistry Recent Labs     04/06/21  0523   *      K 3.8      CO2 24   BUN 8   CREA 0.65   CA 8.3*   AGAP 8   BUCR 12      CBC w/Diff Recent Labs     04/07/21  0515 04/06/21  0523 04/05/21  0525   WBC 12.3 15.0* 15.9*   RBC 2.65* 2.98* 3.01*   HGB 8.2* 9.1* 9.3*   HCT 24.0* 26.7* 27.1*   * 410 325   GRANS 74* 77* 79*   LYMPH 13* 10* 9*   EOS 1 0 0      Microbiology Recent Labs     04/04/21  1241 04/04/21  1220   CULT NO GROWTH 3 DAYS No growth (<1,000 CFU/ML)          RADIOLOGY:    All available imaging studies/reports in [facility] for this admission were reviewed      Disclaimer: Sections of this note are dictated utilizing voice recognition software, which may have resulted in some phonetic based errors in grammar and contents. Even though attempts were made to correct all the mistakes, some may have been missed, and remained in the body of the document. If questions arise, please contact our department.     Dr. Sony Christie, Infectious Disease Specialist  [phone]  April 7, 2021  4:44 PM

## 2021-04-07 NOTE — PROGRESS NOTES
New diagnosis of atrial fibrillation  Plan for Eliquis with aspirin for discharge  Cardiology input appreciated  ID input appreciated, possible white count from small anterior compartment infarct  Continue with physical therapy

## 2021-04-07 NOTE — PROGRESS NOTES
Problem: Falls - Risk of  Goal: *Absence of Falls  Description: Document [place] Fall Risk and appropriate interventions in the flowsheet.   Outcome: Progressing Towards Goal  Note: Fall Risk Interventions:  Mobility Interventions: Communicate number of staff needed for ambulation/transfer, Patient to call before getting OOB, Utilize walker, cane, or other assistive device         Medication Interventions: Patient to call before getting OOB, Teach patient to arise slowly    Elimination Interventions: Call light in reach, Patient to call for help with toileting needs, Toileting schedule/hourly rounds, Urinal in reach

## 2021-04-07 NOTE — PROGRESS NOTES
conducted a Follow up consultation and Spiritual Assessment for Sanna Bland, who is a 59 y.o.,male. The  provided the following Interventions:  Continued the relationship of care and support. Listened empathically. Patient is in good spirit. Discussed coping skills and review of life. Patient indicated that he was raised [place] but has no preference for any Sabianism at this time. Offered assurance of continued prayer on patient's behalf. Chart reviewed. The following outcomes were achieved:  Patient expressed gratitude for 's visit. Assessment:  There are no further spiritual or Islam issues which require Spiritual Care Services interventions at this time. Plan:  Chaplains will continue to follow and will provide pastoral care on an as needed/requested basis.  recommends bedside caregivers page  on duty if patient shows signs of acute spiritual or emotional distress.      [address], [address]   [phone]

## 2021-04-07 NOTE — PROGRESS NOTES
Problem: Falls - Risk of  Goal: *Absence of Falls  Description: Document Peña Roca Fall Risk and appropriate interventions in the flowsheet. Outcome: Progressing Towards Goal  Note: Fall Risk Interventions:  Mobility Interventions: Communicate number of staff needed for ambulation/transfer, Patient to call before getting OOB         Medication Interventions: Bed/chair exit alarm, Evaluate medications/consider consulting pharmacy, Patient to call before getting OOB    Elimination Interventions: Bed/chair exit alarm, Call light in reach, Toileting schedule/hourly rounds, Urinal in reach              Problem: Patient Education: Go to Patient Education Activity  Goal: Patient/Family Education  Outcome: Progressing Towards Goal     Problem: Pressure Injury - Risk of  Goal: *Prevention of pressure injury  Description: Document Efrem Scale and appropriate interventions in the flowsheet. Outcome: Progressing Towards Goal  Note: Pressure Injury Interventions:  Sensory Interventions: Assess changes in LOC, Sit a 90-degree angle/use footstool if needed, Pressure redistribution bed/mattress (bed type)    Moisture Interventions: Absorbent underpads, Check for incontinence Q2 hours and as needed    Activity Interventions: Pressure redistribution bed/mattress(bed type), PT/OT evaluation    Mobility Interventions: PT/OT evaluation, Pressure redistribution bed/mattress (bed type), Turn and reposition approx.  every two hours(pillow and wedges)    Nutrition Interventions: Document food/fluid/supplement intake                     Problem: Patient Education: Go to Patient Education Activity  Goal: Patient/Family Education  Outcome: Progressing Towards Goal     Problem: Patient Education: Go to Patient Education Activity  Goal: Patient/Family Education  Outcome: Progressing Towards Goal

## 2021-04-08 LAB
ANION GAP SERPL CALC-SCNC: 9 MMOL/L (ref 3–18)
APTT PPP: 87.8 SEC (ref 23–36.4)
BUN SERPL-MCNC: 8 MG/DL (ref 7–18)
BUN/CREAT SERPL: 11 (ref 12–20)
CALCIUM SERPL-MCNC: 8.3 MG/DL (ref 8.5–10.1)
CHLORIDE SERPL-SCNC: 107 MMOL/L (ref 100–111)
CO2 SERPL-SCNC: 25 MMOL/L (ref 21–32)
CREAT SERPL-MCNC: 0.74 MG/DL (ref 0.6–1.3)
GLUCOSE SERPL-MCNC: 125 MG/DL (ref 74–99)
MAGNESIUM SERPL-MCNC: 2.2 MG/DL (ref 1.6–2.6)
POTASSIUM SERPL-SCNC: 3.7 MMOL/L (ref 3.5–5.5)
SODIUM SERPL-SCNC: 141 MMOL/L (ref 136–145)
TSH SERPL DL<=0.05 MIU/L-ACNC: 2.08 UIU/ML (ref 0.36–3.74)

## 2021-04-08 PROCEDURE — 74011250637 HC RX REV CODE- 250/637: Performed by: INTERNAL MEDICINE

## 2021-04-08 PROCEDURE — 74011250637 HC RX REV CODE- 250/637: Performed by: PHYSICIAN ASSISTANT

## 2021-04-08 PROCEDURE — 74011250636 HC RX REV CODE- 250/636: Performed by: SURGERY

## 2021-04-08 PROCEDURE — 97530 THERAPEUTIC ACTIVITIES: CPT

## 2021-04-08 PROCEDURE — 85730 THROMBOPLASTIN TIME PARTIAL: CPT

## 2021-04-08 PROCEDURE — 80048 BASIC METABOLIC PNL TOTAL CA: CPT

## 2021-04-08 PROCEDURE — 36415 COLL VENOUS BLD VENIPUNCTURE: CPT

## 2021-04-08 PROCEDURE — 83735 ASSAY OF MAGNESIUM: CPT

## 2021-04-08 PROCEDURE — 65660000004 HC RM CVT STEPDOWN

## 2021-04-08 PROCEDURE — 84443 ASSAY THYROID STIM HORMONE: CPT

## 2021-04-08 PROCEDURE — 99232 SBSQ HOSP IP/OBS MODERATE 35: CPT | Performed by: INTERNAL MEDICINE

## 2021-04-08 PROCEDURE — 74011000250 HC RX REV CODE- 250: Performed by: NURSE PRACTITIONER

## 2021-04-08 PROCEDURE — 74011250637 HC RX REV CODE- 250/637: Performed by: SURGERY

## 2021-04-08 RX ORDER — DILTIAZEM HYDROCHLORIDE 60 MG/1
60 TABLET, FILM COATED ORAL
Status: DISCONTINUED | OUTPATIENT
Start: 2021-04-08 | End: 2021-04-09

## 2021-04-08 RX ADMIN — DEXTROSE MONOHYDRATE AND SODIUM CHLORIDE 100 ML/HR: 5; .45 INJECTION, SOLUTION INTRAVENOUS at 06:09

## 2021-04-08 RX ADMIN — METOPROLOL TARTRATE 50 MG: 50 TABLET, FILM COATED ORAL at 17:16

## 2021-04-08 RX ADMIN — DILTIAZEM HYDROCHLORIDE 60 MG: 60 TABLET, FILM COATED ORAL at 16:06

## 2021-04-08 RX ADMIN — Medication 10 ML: at 21:02

## 2021-04-08 RX ADMIN — ASPIRIN 81 MG CHEWABLE TABLET 81 MG: 81 TABLET CHEWABLE at 08:00

## 2021-04-08 RX ADMIN — OXYCODONE HYDROCHLORIDE AND ACETAMINOPHEN 2 TABLET: 5; 325 TABLET ORAL at 16:08

## 2021-04-08 RX ADMIN — GABAPENTIN 400 MG: 300 CAPSULE ORAL at 21:01

## 2021-04-08 RX ADMIN — HYDROMORPHONE HYDROCHLORIDE 1 MG: 1 INJECTION, SOLUTION INTRAMUSCULAR; INTRAVENOUS; SUBCUTANEOUS at 02:28

## 2021-04-08 RX ADMIN — Medication 10 ML: at 14:00

## 2021-04-08 RX ADMIN — FUROSEMIDE 20 MG: 20 TABLET ORAL at 08:01

## 2021-04-08 RX ADMIN — THERA TABS 1 TABLET: TAB at 08:00

## 2021-04-08 RX ADMIN — METOPROLOL TARTRATE 50 MG: 50 TABLET, FILM COATED ORAL at 08:01

## 2021-04-08 RX ADMIN — APIXABAN 5 MG: 5 TABLET, FILM COATED ORAL at 17:16

## 2021-04-08 RX ADMIN — GABAPENTIN 400 MG: 300 CAPSULE ORAL at 08:00

## 2021-04-08 RX ADMIN — ATORVASTATIN CALCIUM 80 MG: 40 TABLET, FILM COATED ORAL at 21:00

## 2021-04-08 RX ADMIN — OXYCODONE HYDROCHLORIDE AND ACETAMINOPHEN 2 TABLET: 5; 325 TABLET ORAL at 20:50

## 2021-04-08 RX ADMIN — GABAPENTIN 400 MG: 300 CAPSULE ORAL at 16:06

## 2021-04-08 RX ADMIN — DILTIAZEM HYDROCHLORIDE 60 MG: 60 TABLET, FILM COATED ORAL at 08:01

## 2021-04-08 RX ADMIN — DEXTROSE MONOHYDRATE AND SODIUM CHLORIDE 100 ML/HR: 5; .45 INJECTION, SOLUTION INTRAVENOUS at 14:39

## 2021-04-08 RX ADMIN — HYDROMORPHONE HYDROCHLORIDE 1 MG: 1 INJECTION, SOLUTION INTRAMUSCULAR; INTRAVENOUS; SUBCUTANEOUS at 07:57

## 2021-04-08 RX ADMIN — LOSARTAN POTASSIUM 100 MG: 50 TABLET, FILM COATED ORAL at 08:01

## 2021-04-08 RX ADMIN — Medication 10 ML: at 06:10

## 2021-04-08 RX ADMIN — DILTIAZEM HYDROCHLORIDE 60 MG: 60 TABLET, FILM COATED ORAL at 10:46

## 2021-04-08 RX ADMIN — DILTIAZEM HYDROCHLORIDE 60 MG: 60 TABLET, FILM COATED ORAL at 21:01

## 2021-04-08 NOTE — PROGRESS NOTES
Spoke with pt, he does not feel he needs snf. Wants hh. Lives alone but daughter, is available to assist him when needed. Also has a brother who can assist.   Latonya Hernandez completed for [facility] for therapy. Plan home with hh, dtr will transport. Can give pt eliquis card for 10.00 copay.

## 2021-04-08 NOTE — PROGRESS NOTES
Bedside, Verbal, and Written shift change report given to Talia Harrison RN (oncoming nurse) by Charito Hannah RN (offgoing nurse). Report included the following information SBAR, Kardex, Intake/Output, MAR, Recent Results, and Cardiac Rhythm AFIB. 0740 PTT therapeutic, will repeat PTT in AM    Bedside, Verbal, and Written shift change report given to Charito Hannah RN (oncoming nurse) by Talia Harrison RN (offgoing nurse). Report included the following information SBAR, Kardex, Intake/Output, MAR, Recent Results, and Cardiac Rhythm AFIB.

## 2021-04-08 NOTE — PROGRESS NOTES
Comprehensive Nutrition Assessment    Type and Reason for Visit: Initial    Nutrition Recommendations/Plan:  - Continue cardiac diet. - IVF per MD.    Nutrition Assessment:  Atherosclerosis with intermittent claudication & occlusion of arteries in right leg s/p vascular interventions 4/1 & 4/2. Cardiac diet with excellent meal intake & appetite. Denies having any nutritional questions at this time. Malnutrition Assessment:  Malnutrition Status:  No malnutrition      Nutrition History and Allergies: PMHx- CAD, heart failure, dyslipidemia, vitamin D deficiency, pulmonary HTN, PAD with left AKA (June 2017). Wt overall stable per chart review with -204#. Pt denies change in weight or appetite PTA with usual meal intake of 2-3 meals/day. NKFA. Estimated Daily Nutrient Needs:  Energy (kcal): 9041-6406; Weight Used for Energy Requirements: Current(90 kg)  Protein (g): 72-90; Weight Used for Protein Requirements: Current(0.8-1)  Fluid (ml/day): 8249-2810; Method Used for Fluid Requirements: 1 ml/kcal      Nutrition Related Findings:  BM 4/3. MVI. Wounds:    Surgical incision       Additional Caloric Sources: D5 ½ NS at 100 mL/hr (120 gm dextrose, 408 kcal per day)    Current Nutrition Therapies:  DIET CARDIAC Regular    Anthropometric Measures:  · Height:  5' 10\" (177.8 cm)  · Current Body Wt:  89.7 kg (197 lb 12 oz)   · Admission Body Wt:  203 lb(pt stated)    · Usual Body Wt:  92.1 kg (203 lb)     · Ideal Body Wt:  166 lbs:  119.1 %   · Adjusted Body Weight:  217.7;  Weight Adjustment for: Amputation   · Adjusted BMI:  31.3    · BMI Category:  Obese class 1 (BMI 30.0-34.9)(adjusted BMI)       Nutrition Diagnosis:   No nutrition diagnosis at this time    Nutrition Interventions:   Food and/or Nutrient Delivery: Continue current diet, IV fluid delivery  Nutrition Education and Counseling: Education not indicated  Coordination of Nutrition Care: Continue to monitor while inpatient(pt discussed with RN)    Goals:  PO nutrition intake will continue to meet >75% of patients estimated nutritional needs over the next 7 days.        Nutrition Monitoring and Evaluation:   Behavioral-Environmental Outcomes: None identified  Food/Nutrient Intake Outcomes: Food and nutrient intake, IVF intake  Physical Signs/Symptoms Outcomes: Biochemical data, Meal time behavior, Nutrition focused physical findings    Discharge Planning:    Continue current diet     Electronically signed by Gemma Nye RD on 4/8/2021 at 11:24 AM    Contact: [phone]

## 2021-04-08 NOTE — PROGRESS NOTES
Pain improved while at rest, does have pain when he steps on his foot. Tolerating diet vital signs are stable  Continue with physical therapy  We will transition to Eliquis.

## 2021-04-08 NOTE — PROGRESS NOTES
Problem: Mobility Impaired (Adult and Pediatric)  Goal: *Acute Goals and Plan of Care (Insert Text)  Description: Physical Therapy Goals  Initiated 4/5/2021 and to be accomplished within 7 day(s)  1. Patient will move from supine to sit and sit to supine , scoot up and down, and roll side to side in bed with modified independence. 2.  Patient will transfer from bed to chair and chair to bed with minimal assistance using the least restrictive device. 3.  Patient will perform sit to stand with minimal assistance. 4.  Patient will ambulate with minimal assistance for 10 feet with the least restrictive device. 5.  Patient will ascend/descend stairs pending progress and need for discharge. 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 Outcome: Progressing Towards Goal     PHYSICAL THERAPY TREATMENT    Patient: Kaylah Francois (10 y.o. male)  Date: 4/8/2021  Diagnosis: Atherosclerosis of native artery of right lower extremity with intermittent claudication ([facility]) [I70.211]  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[address] [I70.201] <principal problem not specified>  Procedure(s) (LRB):  RIGHT LEG THROMBECTOMY (Right) 6 Days Post-Op  Precautions: Fall, Skin    ASSESSMENT:  RN cleared for PT to work with pt. Pt found supine in bed, in NAD, willing to work with PT. He reports slight pain in his leg but it is overall feeling better. He performed supine-sit with Cecy. Pt stood with RW and CGA, no prosthetic donned as he left it at home. Pt stood for 30 seconds max before needing to sit down. He reports he is unable to hop today 2/2 the pain on the bottom of his foot. Pt returned supine and was edu on exercises per flow sheet. Pt encouraged to keep moving his R foot in PF/DF/EV/INV to improve circulation and AROM. Pt left with call bell nearby and all needs met.    Progression toward goals:   []      Improving appropriately and progressing toward goals  [x]      Improving slowly and progressing toward goals  []      Not making progress toward goals and plan of care will be adjusted     PLAN:  Patient continues to benefit from skilled intervention to address the above impairments. Continue treatment per established plan of care. Discharge Recommendations:  Home Health with increased assist  Further Equipment Recommendations for Discharge:  N/A     SUBJECTIVE:   Patient stated it just hurts on the bottom here.     OBJECTIVE DATA SUMMARY:   Critical Behavior:  Neurologic State: Alert  Orientation Level: Oriented X4  Cognition: Appropriate decision making  Safety/Judgement: Fall prevention  Functional Mobility Training:  Bed Mobility:     Supine to Sit: Modified independent  Sit to Supine: Modified independent  Scooting: Modified independent         Transfers:  Sit to Stand: Contact guard assistance  Stand to Sit: Contact guard assistance    Balance:  Sitting: Intact  Standing: Impaired; With support  Standing - Static: Fair;Occasional  Standing - Dynamic : Poor   Therapeutic Exercises:   Pt was edu on and performed x10 SLR and ankle pumps      EXERCISE   Sets   Reps   Active Active Assist   Passive Self ROM   Comments   Ankle Pumps 1 10  [x] [] [] []    Quad Sets/Glut Sets    [] [] [] [] Hold for 5 secs   Hamstring Sets   [] [] [] []    Short Arc Quads   [] [] [] []    Heel Slides   [] [] [] []    Straight Leg Raises   [] [] [] []    Hip Add   [] [] [] [] Hold for 5 secs, w/ pillow squeeze   Long Arc Quads 1 10 [x] [] [] []    Seated Marching   [] [] [] []    Standing Marching   [] [] [] []       [] [] [] []        Pain:  Pain level pre-treatment: 7/10  Pain level post-treatment: 7/10   Pain Intervention(s): Medication (see MAR); Rest, Repositioning   Response to intervention: Nurse notified, See doc flow    Activity Tolerance:   Pt tolerated mobility fair  Please refer to the flowsheet for vital signs taken during this treatment.   After treatment:   [] Patient left in no apparent distress sitting up in chair  [x] Patient left in no apparent distress in bed  [x] Call bell left within reach  [x] Nursing notified  [] Caregiver present  [] Bed alarm activated  [] SCDs applied      COMMUNICATION/EDUCATION:   [x]         Role of Physical Therapy in the acute care setting. [x]         Fall prevention education was provided and the patient/caregiver indicated understanding. [x]         Patient/family have participated as able in working toward goals and plan of care. []         Patient/family agree to work toward stated goals and plan of care. []         Patient understands intent and goals of therapy, but is neutral about his/her participation.   []         Patient is unable to participate in stated goals/plan of care: ongoing with therapy staff.  []         Other:        Laith Laguna   Time Calculation: 10 mins

## 2021-04-08 NOTE — PROGRESS NOTES
Cardiology Progress Note      Patient: Kay Arteaga MRN: [id number]  SSN: [id number]    YOB: 1960  Age: 59 y.o. Sex: male      Admit Date: 3/31/2021    Assessment:     -Presented for angiography R leg due to R leg arterial occlusion - occluded R leg bypass graft.  Hx peripheral vascular disease s/p left AKA. -S/p thrombectomy R axillofemoral bypass, thrombectomy R femorpopliteal bypass, revision R femoral artery with patch angioplasty by Dr. El Steinberg 4/1/2021  -S/p thrombectomy R leg femoropopliteal bypass and tibial runoff by Dr. El Steinberg 4/2/2021.  -Consulted due to abnormal EKG, preop  -Echo 4/2/2021: EF 55-60%, normal LV cavity size, wall thickness and wall motion, age-appropriate LV diastolic function  -Paroxysmal atrial fibrillation with RVR. Previously on Coumadin for anticoagulation, s/p BERRY/cardioversion 4/18/2017  -Hx Nonischemic cardiomyopathy  -Echo 10/2017 with findings as follows: EF 55%, hypokinesis of basal inferior walls, normal LV wall thickness, normal diastolic function, mildly dilated RA, trivial mitral regurgitation  -CAD, cardiac cath 3/8/2017 with findings as follows:  · EF around 30%, no diagnostic segmental wall motion abnormality  · LM: Diffuse 30%  · LAD: Diffuse 30%  · Diagonal branches: Diffuse 30%  · Cx: Proximal 70%; trifurcation healed ruptured plaque with residual 90% (RYLEE 3)  · OM branches: Angiographically normal  · RCA: Diffuse 50% with focal mid 90%  -S/p successful stenting of Cx and RCA to 0% during subsequent cardiac cath 3/15/2017- stable no anginal symptoms   -Nuclear stress test 11/2020 was low-risk, EF 51%  -Dyslipidemia     Previously followed by Dr. Artelia Essex, first appointment with Dr. Iron Garcia 4/27/2021    Plan:     Seen and evaluated. Agree with below. Would continue current cardiac medication regimen. Transition to oral medication as tolerated.   Will defer timing of Eliquis initiation to surgical team.    -Will discontinue Cardizem infusion, increase frequency of Cardizem IR. Plan to consolidate Cardizem dosing tomorrow as long as rates remain stable. Continue PO Lopressor.  -Continued on Heparin infusion for anticoagulation, agree with plan to switch to Eliquis by time of discharge per vascular surgery team.  -Continued on ASA, Lipitor, Cozaar.  -Continued on PO Lasix. Subjective:     No new complaints. Objective:      Patient Vitals for the past 8 hrs:   Temp Pulse Resp BP SpO2   04/08/21 0722 98.6 °F (37 °C) (!) 119 20 128/73 99 %   04/08/21 0433 98 °F (36.7 °C) 76 18 (!) 130/59 98 %         Patient Vitals for the past 96 hrs:   Weight   04/08/21 0614 89.7 kg (197 lb 12 oz)   04/07/21 0617 89.1 kg (196 lb 6.4 oz)   04/06/21 0452 87.9 kg (193 lb 12.8 oz)   04/05/21 0400 88.2 kg (194 lb 6.4 oz)         Intake/Output Summary (Last 24 hours) at 4/8/2021 8049  Last data filed at 4/8/2021 6208  Gross per 24 hour   Intake 2203.92 ml   Output 2500 ml   Net -296.08 ml       Physical Exam:  General:  alert, cooperative, no distress, appears stated age  Neck:  supple  Lungs:  clear to auscultation bilaterally  Heart:  Irregularly irregular rhythm  Abdomen:  abdomen is soft without significant tenderness, masses, organomegaly or guarding  Extremities:  L AKA. RLE with mild erythema and edema.      Data Review:     Labs: Results:       Chemistry Recent Labs     04/06/21  0523   *      K 3.8      CO2 24   BUN 8   CREA 0.65   CA 8.3*   MG 2.2   AGAP 8   BUCR 12      CBC w/Diff Recent Labs     04/07/21  2045 04/07/21  0515 04/06/21  0523   WBC 13.7* 12.3 15.0*   RBC 2.51* 2.65* 2.98*   HGB 7.9* 8.2* 9.1*   HCT 23.1* 24.0* 26.7*   * 421* 410   GRANS 71 74* 77*   LYMPH 17* 13* 10*   EOS 1 1 0      Coagulation Recent Labs     04/08/21  0705 04/07/21 2045   APTT 87.8* 128.9*       Lipid Panel Lab Results   Component Value Date/Time    Cholesterol, total 105 09/08/2020 07:09 AM    HDL Cholesterol 26 (L) 09/08/2020 07:09 AM LDL, calculated 60.4 09/08/2020 07:09 AM    VLDL, calculated 18.6 09/08/2020 07:09 AM    Triglyceride 93 09/08/2020 07:09 AM    CHOL/HDL Ratio 4.0 09/08/2020 07:09 AM      Thyroid Studies Lab Results   Component Value Date/Time    T4, Total 9.0 11/09/2017 10:28 AM    TSH 1.92 10/23/2018 10:05 AM

## 2021-04-08 NOTE — PROGRESS NOTES
1930 bedside turnover given to me by RN Fidel Leonardo. Pt is in bed sleeping at this time. Verified Heparin and Cardizem. Updates given to me with a chance to ask questions  2000 pt's heparin empty, pump alarming, woke him up, when I went in to hang heparin pt asked for pain medication, stated \"My leg is throbbing can I have pain medication. Pain 8/10 given 1 dilaudid.

## 2021-04-09 PROCEDURE — 74011250637 HC RX REV CODE- 250/637: Performed by: PHYSICIAN ASSISTANT

## 2021-04-09 PROCEDURE — 99232 SBSQ HOSP IP/OBS MODERATE 35: CPT | Performed by: INTERNAL MEDICINE

## 2021-04-09 PROCEDURE — 74011250637 HC RX REV CODE- 250/637: Performed by: SURGERY

## 2021-04-09 PROCEDURE — 97530 THERAPEUTIC ACTIVITIES: CPT

## 2021-04-09 PROCEDURE — 2709999900 HC NON-CHARGEABLE SUPPLY

## 2021-04-09 PROCEDURE — 74011250637 HC RX REV CODE- 250/637: Performed by: INTERNAL MEDICINE

## 2021-04-09 PROCEDURE — 65660000004 HC RM CVT STEPDOWN

## 2021-04-09 PROCEDURE — 74011000250 HC RX REV CODE- 250: Performed by: NURSE PRACTITIONER

## 2021-04-09 RX ORDER — DILTIAZEM HYDROCHLORIDE 120 MG/1
120 CAPSULE, COATED, EXTENDED RELEASE ORAL
Status: COMPLETED | OUTPATIENT
Start: 2021-04-09 | End: 2021-04-09

## 2021-04-09 RX ORDER — DILTIAZEM HYDROCHLORIDE 180 MG/1
180 CAPSULE, COATED, EXTENDED RELEASE ORAL DAILY
Status: DISCONTINUED | OUTPATIENT
Start: 2021-04-10 | End: 2021-04-12 | Stop reason: HOSPADM

## 2021-04-09 RX ORDER — DILTIAZEM HYDROCHLORIDE 120 MG/1
120 CAPSULE, COATED, EXTENDED RELEASE ORAL
Status: DISCONTINUED | OUTPATIENT
Start: 2021-04-09 | End: 2021-04-12 | Stop reason: HOSPADM

## 2021-04-09 RX ADMIN — ASPIRIN 81 MG CHEWABLE TABLET 81 MG: 81 TABLET CHEWABLE at 08:07

## 2021-04-09 RX ADMIN — DILTIAZEM HYDROCHLORIDE 120 MG: 120 CAPSULE, COATED, EXTENDED RELEASE ORAL at 22:08

## 2021-04-09 RX ADMIN — OXYCODONE HYDROCHLORIDE AND ACETAMINOPHEN 2 TABLET: 5; 325 TABLET ORAL at 08:08

## 2021-04-09 RX ADMIN — THERA TABS 1 TABLET: TAB at 08:08

## 2021-04-09 RX ADMIN — DILTIAZEM HYDROCHLORIDE 120 MG: 120 CAPSULE, COATED, EXTENDED RELEASE ORAL at 10:18

## 2021-04-09 RX ADMIN — FUROSEMIDE 20 MG: 20 TABLET ORAL at 08:08

## 2021-04-09 RX ADMIN — GABAPENTIN 400 MG: 300 CAPSULE ORAL at 08:08

## 2021-04-09 RX ADMIN — APIXABAN 5 MG: 5 TABLET, FILM COATED ORAL at 17:12

## 2021-04-09 RX ADMIN — OXYCODONE HYDROCHLORIDE AND ACETAMINOPHEN 2 TABLET: 5; 325 TABLET ORAL at 17:12

## 2021-04-09 RX ADMIN — LOSARTAN POTASSIUM 100 MG: 50 TABLET, FILM COATED ORAL at 08:08

## 2021-04-09 RX ADMIN — GABAPENTIN 400 MG: 300 CAPSULE ORAL at 22:14

## 2021-04-09 RX ADMIN — DEXTROSE MONOHYDRATE AND SODIUM CHLORIDE 100 ML/HR: 5; .45 INJECTION, SOLUTION INTRAVENOUS at 11:12

## 2021-04-09 RX ADMIN — METOPROLOL TARTRATE 50 MG: 50 TABLET, FILM COATED ORAL at 17:12

## 2021-04-09 RX ADMIN — METOPROLOL TARTRATE 50 MG: 50 TABLET, FILM COATED ORAL at 08:08

## 2021-04-09 RX ADMIN — APIXABAN 5 MG: 5 TABLET, FILM COATED ORAL at 08:08

## 2021-04-09 RX ADMIN — Medication 10 ML: at 13:05

## 2021-04-09 RX ADMIN — ATORVASTATIN CALCIUM 80 MG: 40 TABLET, FILM COATED ORAL at 22:14

## 2021-04-09 RX ADMIN — Medication 10 ML: at 22:16

## 2021-04-09 RX ADMIN — DILTIAZEM HYDROCHLORIDE 60 MG: 60 TABLET, FILM COATED ORAL at 08:08

## 2021-04-09 RX ADMIN — GABAPENTIN 400 MG: 300 CAPSULE ORAL at 17:12

## 2021-04-09 NOTE — PROGRESS NOTES
Bedside turnover given to me by RN Gigi Turner. Pt is awake watching tv, no signs of distress, denies chest pain and denies shortness of breath. Sbar, mar ed summary given with a chance to ask questions. IV WNL. Call bell and personal effects at bedside within reach    2100 discussed his discharge coming up early this week and started to wean off of his iv pain medication. Pt agrees to use percocet for pain. 2115 all evening meds given urinal emptied ice water given with an offer of a bedtime snack, pt denies wanting anything to eat. He has a bag of candy that his daughter brought for him and he is snacking on it.   2220 pain reassessment completed

## 2021-04-09 NOTE — PROGRESS NOTES
[facility]  -  Progress Note      Patient: Carolyn Landis MRN: [id number]  SSN: [id number]    YOB: 1960  Age: 59 y.o. Sex: male      Admit Date: 3/31/2021    Assessment:     -Presented for angiography R leg due to R leg arterial occlusion - occluded R leg bypass graft.  Hx peripheral vascular disease s/p left AKA. -S/p thrombectomy R axillofemoral bypass, thrombectomy R femorpopliteal bypass, revision R femoral artery with patch angioplasty by Dr. Omkar Garcia 4/1/2021  -S/p thrombectomy R leg femoropopliteal bypass and tibial runoff by Dr. Omkar Garcia 4/2/2021.  -Consulted due to abnormal EKG, preop  -Echo 4/2/2021: EF 55-60%, normal LV cavity size, wall thickness and wall motion, age-appropriate LV diastolic function  -Paroxysmal atrial fibrillation with RVR.   Previously on Coumadin for anticoagulation, s/p BERRY/cardioversion 4/18/2017  -Hx Nonischemic cardiomyopathy  -Echo 10/2017 with findings as follows: EF 55%, hypokinesis of basal inferior walls, normal LV wall thickness, normal diastolic function, mildly dilated RA, trivial mitral regurgitation  -CAD, cardiac cath 3/8/2017 with findings as follows:  · EF around 30%, no diagnostic segmental wall motion abnormality  · LM: Diffuse 30%  · LAD: Diffuse 30%  · Diagonal branches: Diffuse 30%  · Cx: Proximal 70%; trifurcation healed ruptured plaque with residual 90% (RYLEE 3)  · OM branches: Angiographically normal  · RCA: Diffuse 50% with focal mid 90%  -S/p successful stenting of Cx and RCA to 0% during subsequent cardiac cath 3/15/2017- stable no anginal symptoms   -Nuclear stress test 11/2020 was low-risk, EF 51%  -Dyslipidemia     Previously followed by Dr. Shiela Coates, first appointment with Dr. Chito Starr 4/27/2021    Plan:     -Will start Cardizem CD today, continue to monitor HR and BP.  -Continue Eliquis for anticoagulation.  -Dispo planning per primary team.    Staff addendum:  No further cardiac workup, ok to discharge, please call if questions. Patient will followup Dr. Maricruz Quintero. I saw, examined, and evaluated the patient. I personally reviewed the patient's labs, tests, vitals, orders, medications, updated history, and other providers assessments. I personally agree with the findings as stated and the plan as documented. Stephanie Gonsalez MD    Subjective:     No new complaints.       Objective:      Patient Vitals for the past 8 hrs:   Temp Resp BP SpO2   04/09/21 0827 98.4 °F (36.9 °C) 20 (!) 146/89 98 %         Patient Vitals for the past 96 hrs:   Weight   04/09/21 0307 89.8 kg (198 lb 1.3 oz)   04/08/21 0614 89.7 kg (197 lb 12 oz)   04/07/21 0617 89.1 kg (196 lb 6.4 oz)   04/06/21 0452 87.9 kg (193 lb 12.8 oz)         Intake/Output Summary (Last 24 hours) at 4/9/2021 1128  Last data filed at 4/9/2021 7875  Gross per 24 hour   Intake 577.26 ml   Output 1900 ml   Net -1322.74 ml       Physical Exam:  General:  alert, cooperative, no distress, appears stated age  Neck:  supple  Lungs:  clear to auscultation bilaterally  Heart:  Irregularly irregular rhythm  Abdomen:  abdomen is soft without significant tenderness, masses, organomegaly or guarding  Extremities:  L AKA, RLE with mild edema and erythema    Data Review:     Labs: Results:       Chemistry Recent Labs     04/08/21  0705   *      K 3.7      CO2 25   BUN 8   CREA 0.74   CA 8.3*   MG 2.2   AGAP 9   BUCR 11*      CBC w/Diff Recent Labs     04/07/21 2045 04/07/21  0515   WBC 13.7* 12.3   RBC 2.51* 2.65*   HGB 7.9* 8.2*   HCT 23.1* 24.0*   * 421*   GRANS 71 74*   LYMPH 17* 13*   EOS 1 1      Coagulation Recent Labs     04/08/21  0705 04/07/21 2045   APTT 87.8* 128.9*       Lipid Panel Lab Results   Component Value Date/Time    Cholesterol, total 105 09/08/2020 07:09 AM    HDL Cholesterol 26 (L) 09/08/2020 07:09 AM    LDL, calculated 60.4 09/08/2020 07:09 AM    VLDL, calculated 18.6 09/08/2020 07:09 AM    Triglyceride 93 09/08/2020 07:09 AM    CHOL/HDL Ratio 4.0 09/08/2020 07:09 AM      Thyroid Studies Lab Results   Component Value Date/Time    T4, Total 9.0 11/09/2017 10:28 AM    TSH 2.08 04/08/2021 07:05 AM

## 2021-04-09 NOTE — PROGRESS NOTES
Problem: Mobility Impaired (Adult and Pediatric)  Goal: *Acute Goals and Plan of Care (Insert Text)  Description: Physical Therapy Goals  Initiated 4/5/2021 and to be accomplished within 7 day(s)  1. Patient will move from supine to sit and sit to supine , scoot up and down, and roll side to side in bed with modified independence. 2.  Patient will transfer from bed to chair and chair to bed with minimal assistance using the least restrictive device. 3.  Patient will perform sit to stand with minimal assistance. 4.  Patient will ambulate with minimal assistance for 10 feet with the least restrictive device. 5.  Patient will ascend/descend stairs pending progress and need for discharge. 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 Outcome: Progressing Towards Goal     PHYSICAL THERAPY TREATMENT    Patient: Safia Sanchez (11 y.o. male)  Date: 4/9/2021  Diagnosis: Atherosclerosis of native artery of right lower extremity with intermittent claudication ([facility]) [I70.211]  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[address] [I70.201] <principal problem not specified>  Procedure(s) (LRB):  RIGHT LEG THROMBECTOMY (Right) 7 Days Post-Op  Precautions: Fall, Skin  PLOF:     ASSESSMENT:  Pt is received in bed in NAD and motivated to participate, pt is reporting less pain in the RLE than yesterday. Pt continues to be able to perform bed mobility with SBA and first stand up to RW from EOB with CGA. Pt then performs stand pivot transfer with the RLE to turn and sit in chair. Pt then performs 2 more standing trials with the RW and progresses to SBA, tolerates approx 3 min of standing each time and completes x10 RLE mini squats each time for LE strengthening to aid in mobility. On third standing trial pt amb with hop to gait pattern on the RLE x 6 ft in small Elim IRA in room with CGA for safety, no LOB noted, pt able to safely manage RW.  After each standing trial, pt fatigued with increased HR up 130-150 bpm, resolving back to 120's bpm at rest with sitting reach break. Pt made great progress this date and informed PT that his daughter will be bringing his prosthetic leg today and will trial mobility with the prosthetic tomorrow with the idea being that he will have improved mobility with less of his weight being on the R operated leg and pt is agreeable. At this time, pt is progressing to being safe for discharge home with [facility] and family support. He has all necessary equipment of wc and walker needed to mobilize safely. Will continue to follow and trial using prosthetic leg next visit. Pt left up in chair with RLE elevated, call bell within reach, all needs met, nurse notified of pt progress this date. Progression toward goals:   [x]      Improving appropriately and progressing toward goals  []      Improving slowly and progressing toward goals  []      Not making progress toward goals and plan of care will be adjusted     PLAN:  Patient continues to benefit from skilled intervention to address the above impairments. Continue treatment per established plan of care. Discharge Recommendations:  Home Health with family support   Further Equipment Recommendations for Discharge:  N/A     SUBJECTIVE:   Patient stated I want to go home so I will work today to get out of here.     OBJECTIVE DATA SUMMARY:   Critical Behavior:  Neurologic State: Alert  Orientation Level: Oriented X4  Cognition: Follows commands  Safety/Judgement: Fall prevention  Functional Mobility Training:  Bed Mobility:     Supine to Sit: Modified independent  Sit to Supine: Modified independent  Scooting: Modified independent         Transfers:  Sit to Stand: Stand-by assistance  Stand to Sit: Stand-by assistance    Balance:  Sitting: Intact  Standing: Intact; With support  Standing - Static: Fair  Standing - Dynamic : Fair       Ambulation/Gait Training:  Distance (ft): 6 Feet (ft)  Assistive Device: Walker, rolling  Ambulation - Level of Assistance: Contact guard assistance        Gait Abnormalities: Decreased step clearance    Speed/Suzanna: Slow  Step Length: Right shortened    Pain:  Pain level pre-treatment: 3/10  Pain level post-treatment: 3/10   Pain Intervention(s): Medication (see MAR); Rest, Repositioning   Response to intervention: Nurse notified    Activity Tolerance:   Good    Please refer to the flowsheet for vital signs taken during this treatment. After treatment:   [x] Patient left in no apparent distress sitting up in chair  [] Patient left in no apparent distress in bed  [x] Call bell left within reach  [x] Nursing notified  [] Caregiver present  [] Bed alarm activated  [] SCDs applied      COMMUNICATION/EDUCATION:   [x]         Role of Physical Therapy in the acute care setting. [x]         Fall prevention education was provided and the patient/caregiver indicated understanding. [x]         Patient/family have participated as able in working toward goals and plan of care. [x]         Patient/family agree to work toward stated goals and plan of care. []         Patient understands intent and goals of therapy, but is neutral about his/her participation.   []         Patient is unable to participate in stated goals/plan of care: ongoing with therapy staff.  []         Other:        Juliette Moreno   Time Calculation: 38 mins

## 2021-04-09 NOTE — PROGRESS NOTES
Spoke with Mr Angela Mcclelland, he wants to go home sister present in room. She states she and other family will assist pt. He amb with therapy this am he has a walker and w/c already. Therapy recommends hh. foc completed for [facility].

## 2021-04-10 LAB
BACTERIA SPEC CULT: NORMAL
SERVICE CMNT-IMP: NORMAL

## 2021-04-10 PROCEDURE — 74011250637 HC RX REV CODE- 250/637: Performed by: PHYSICIAN ASSISTANT

## 2021-04-10 PROCEDURE — 74011250637 HC RX REV CODE- 250/637: Performed by: SURGERY

## 2021-04-10 PROCEDURE — 65660000004 HC RM CVT STEPDOWN

## 2021-04-10 PROCEDURE — 74011250637 HC RX REV CODE- 250/637: Performed by: INTERNAL MEDICINE

## 2021-04-10 PROCEDURE — 74011000250 HC RX REV CODE- 250: Performed by: NURSE PRACTITIONER

## 2021-04-10 RX ADMIN — METOPROLOL TARTRATE 50 MG: 50 TABLET, FILM COATED ORAL at 17:19

## 2021-04-10 RX ADMIN — METOPROLOL TARTRATE 50 MG: 50 TABLET, FILM COATED ORAL at 08:23

## 2021-04-10 RX ADMIN — APIXABAN 5 MG: 5 TABLET, FILM COATED ORAL at 08:22

## 2021-04-10 RX ADMIN — THERA TABS 1 TABLET: TAB at 08:24

## 2021-04-10 RX ADMIN — FUROSEMIDE 20 MG: 20 TABLET ORAL at 08:22

## 2021-04-10 RX ADMIN — DEXTROSE MONOHYDRATE AND SODIUM CHLORIDE 100 ML/HR: 5; .45 INJECTION, SOLUTION INTRAVENOUS at 22:26

## 2021-04-10 RX ADMIN — DILTIAZEM HYDROCHLORIDE 180 MG: 180 CAPSULE, COATED, EXTENDED RELEASE ORAL at 08:24

## 2021-04-10 RX ADMIN — ATORVASTATIN CALCIUM 80 MG: 40 TABLET, FILM COATED ORAL at 22:18

## 2021-04-10 RX ADMIN — OXYCODONE HYDROCHLORIDE AND ACETAMINOPHEN 2 TABLET: 5; 325 TABLET ORAL at 17:26

## 2021-04-10 RX ADMIN — APIXABAN 5 MG: 5 TABLET, FILM COATED ORAL at 17:19

## 2021-04-10 RX ADMIN — Medication 10 ML: at 08:27

## 2021-04-10 RX ADMIN — ASPIRIN 81 MG CHEWABLE TABLET 81 MG: 81 TABLET CHEWABLE at 08:24

## 2021-04-10 RX ADMIN — GABAPENTIN 400 MG: 300 CAPSULE ORAL at 08:23

## 2021-04-10 RX ADMIN — GABAPENTIN 400 MG: 300 CAPSULE ORAL at 17:19

## 2021-04-10 RX ADMIN — GABAPENTIN 400 MG: 300 CAPSULE ORAL at 22:17

## 2021-04-10 RX ADMIN — LOSARTAN POTASSIUM 100 MG: 50 TABLET, FILM COATED ORAL at 08:23

## 2021-04-10 RX ADMIN — DILTIAZEM HYDROCHLORIDE 120 MG: 120 CAPSULE, COATED, EXTENDED RELEASE ORAL at 22:18

## 2021-04-10 RX ADMIN — Medication 10 ML: at 22:00

## 2021-04-10 NOTE — PROGRESS NOTES
Awakens easily, tells me is well rested  Tells me pain is all but disappeared in his leg now. He is walking yesterday with physical therapy without pain. His daughter brought his prosthesis up to the hospital yesterday as well. Discussed this case with nursing who told me his heart rate has been up as high as 170. He does have atrial fibrillation. He is tolerating Eliquis for A. fib. We discussed with the on-call cardiology they will review this heart rate.   We will discharge patient when stable, ischemic symptoms improved

## 2021-04-11 PROCEDURE — 74011250637 HC RX REV CODE- 250/637: Performed by: SURGERY

## 2021-04-11 PROCEDURE — 2709999900 HC NON-CHARGEABLE SUPPLY

## 2021-04-11 PROCEDURE — 74011250637 HC RX REV CODE- 250/637: Performed by: INTERNAL MEDICINE

## 2021-04-11 PROCEDURE — 74011250637 HC RX REV CODE- 250/637: Performed by: PHYSICIAN ASSISTANT

## 2021-04-11 PROCEDURE — 74011000250 HC RX REV CODE- 250: Performed by: NURSE PRACTITIONER

## 2021-04-11 PROCEDURE — 65660000004 HC RM CVT STEPDOWN

## 2021-04-11 PROCEDURE — 97116 GAIT TRAINING THERAPY: CPT

## 2021-04-11 RX ADMIN — APIXABAN 5 MG: 5 TABLET, FILM COATED ORAL at 17:18

## 2021-04-11 RX ADMIN — GABAPENTIN 400 MG: 300 CAPSULE ORAL at 17:18

## 2021-04-11 RX ADMIN — OXYCODONE HYDROCHLORIDE AND ACETAMINOPHEN 2 TABLET: 5; 325 TABLET ORAL at 17:18

## 2021-04-11 RX ADMIN — ATORVASTATIN CALCIUM 80 MG: 40 TABLET, FILM COATED ORAL at 22:19

## 2021-04-11 RX ADMIN — LOSARTAN POTASSIUM 100 MG: 50 TABLET, FILM COATED ORAL at 09:13

## 2021-04-11 RX ADMIN — GABAPENTIN 400 MG: 300 CAPSULE ORAL at 22:19

## 2021-04-11 RX ADMIN — GABAPENTIN 400 MG: 300 CAPSULE ORAL at 09:14

## 2021-04-11 RX ADMIN — METOPROLOL TARTRATE 50 MG: 50 TABLET, FILM COATED ORAL at 09:13

## 2021-04-11 RX ADMIN — ASPIRIN 81 MG CHEWABLE TABLET 81 MG: 81 TABLET CHEWABLE at 09:12

## 2021-04-11 RX ADMIN — METOPROLOL TARTRATE 50 MG: 50 TABLET, FILM COATED ORAL at 17:18

## 2021-04-11 RX ADMIN — APIXABAN 5 MG: 5 TABLET, FILM COATED ORAL at 09:00

## 2021-04-11 RX ADMIN — DILTIAZEM HYDROCHLORIDE 120 MG: 120 CAPSULE, COATED, EXTENDED RELEASE ORAL at 21:14

## 2021-04-11 RX ADMIN — THERA TABS 1 TABLET: TAB at 09:13

## 2021-04-11 RX ADMIN — Medication 10 ML: at 06:13

## 2021-04-11 RX ADMIN — DEXTROSE MONOHYDRATE AND SODIUM CHLORIDE 100 ML/HR: 5; .45 INJECTION, SOLUTION INTRAVENOUS at 06:15

## 2021-04-11 RX ADMIN — FUROSEMIDE 20 MG: 20 TABLET ORAL at 09:13

## 2021-04-11 RX ADMIN — DILTIAZEM HYDROCHLORIDE 180 MG: 180 CAPSULE, COATED, EXTENDED RELEASE ORAL at 10:00

## 2021-04-11 RX ADMIN — OXYCODONE HYDROCHLORIDE AND ACETAMINOPHEN 2 TABLET: 5; 325 TABLET ORAL at 09:11

## 2021-04-11 NOTE — PROGRESS NOTES
Bedside and Verbal shift change report given to [address] [place] [address] (oncoming nurse) by Fabiana Caro (offgoing nurse). Report included the following information SBAR, Kardex and Cardiac Rhythm AFib.     4030: Therapy in room working with patient. 0900:IV sites infiltrated. Held IVF until new access established. 0911:Patient with 8/10 leg pain, gave ordered PRN Percocet (2 tabs). 1900:Bedside and Verbal shift change report given to Fabiana Caro (oncoming nurse) by Chinmay Dior RN (offgoing nurse). Report included the following information SBAR, Kardex and Cardiac Rhythm AFib.

## 2021-04-11 NOTE — DISCHARGE SUMMARY
Physician Discharge Summary     Patient ID:  Safia Sanchez  [id number]  89 y.o.  1960    Admit date: 3/31/2021    Discharge date: 4/11/2021      Admitting Physician: Nancy Hernandez MD     Discharge Physician: Nancy Hernandez MD     Admission Diagnoses: Atherosclerosis of native artery of right lower extremity with intermittent claudication ([facility] [I70.211]  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[facility] [I70.201]    Discharge Diagnoses: There are no discharge diagnoses documented for the most recent discharge. Procedures for this admission: Procedure(s):  RIGHT LEG THROMBECTOMY    Discharged Condition: stable    Hospital Course: Admitted to hospital with acute occlusion of bypass graft. Had thrombectomy of bypass graft and restored recirculation the right leg. Noted postoperatively while monitored in atrial fibrillation and seen by cardiology with recommendations. Is pain-free and stable today. Ambulatory with prosthesis and wishing to go home    Consults: Cardiology    Significant Diagnostic Studies: angiography: Restoration of blood flow right leg    Treatments: surgery: Right leg thrombectomy and graft thrombectomy    Discharge Exam: LUNG: clear to auscultation bilaterally  HEART: regularly irregular rhythm  ABDOMEN:  no change  Viable right lower extremity with Doppler pulses    Disposition: home    Patient Instructions:   Current Discharge Medication List      CONTINUE these medications which have NOT CHANGED    Details   furosemide (LASIX) 20 mg tablet TAKE 1 TABLET DAILY  Qty: 90 Tab, Refills: 1    Associated Diagnoses: Benign hypertensive heart disease with systolic congestive heart failure, NYHA class 2 ([facility]  Chronic systolic heart failure (HCC)      clopidogreL (PLAVIX) 75 mg tab TAKE 1 TABLET ONCE DAILY  Qty: 90 Tab, Refills: 3      metoprolol tartrate (LOPRESSOR) 25 mg tablet TAKE 1 TABLET EVERY 12     HOURS  Qty: 180 Tab, Refills: 1      atorvastatin (LIPITOR) 80 mg tablet TAKE 1 TABLET ONCE DAILY  Qty: 90 Tab, Refills: 3      losartan (COZAAR) 100 mg tablet TAKE 1 TABLET ONCE DAILY  Qty: 90 Tab, Refills: 3      aspirin 81 mg chewable tablet Take 1 Tab by mouth daily. Qty: 90 Tab, Refills: 3      gabapentin (NEURONTIN) 400 mg capsule take 1 capsule by mouth three times a day  Qty: 270 Cap, Refills: 0      multivitamin (ONE A DAY) tablet Take 1 Tab by mouth daily. Reference discharge instructions as provided by nursing for diet, labs, medications, activity, wound care and any outpatient referrals.     Follow-up with Dr. Monae Bobo and Dr. Heron Prescott    Signed:  Nivia Bedolla MD  4/11/2021  1:40 PM

## 2021-04-11 NOTE — PROGRESS NOTES
Looks well for discharge today, discussed this daughter  We will have to choose a local pharmacy for [address] for discharge tomorrow with scripts and plan for home

## 2021-04-11 NOTE — PROGRESS NOTES
Problem: Mobility Impaired (Adult and Pediatric)  Goal: *Acute Goals and Plan of Care (Insert Text)  Description: Physical Therapy Goals  Initiated 4/5/2021 and to be accomplished within 7 day(s)  1. Patient will move from supine to sit and sit to supine , scoot up and down, and roll side to side in bed with modified independence. 2.  Patient will transfer from bed to chair and chair to bed with minimal assistance using the least restrictive device. 3.  Patient will perform sit to stand with minimal assistance. 4.  Patient will ambulate with minimal assistance for 10 feet with the least restrictive device. 5.  Patient will ascend/descend stairs pending progress and need for discharge. 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 Outcome: Progressing Towards Goal     PHYSICAL THERAPY TREATMENT    Patient: Sukhwinder Akers (38 y.o. male)  Date: 4/11/2021  Diagnosis: Atherosclerosis of native artery of right lower extremity with intermittent claudication ([facility]) [I70.211]  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[address] [I70.201] <principal problem not specified>  Procedure(s) (LRB):  RIGHT LEG THROMBECTOMY (Right) 9 Days Post-Op  Precautions: Fall, Skin  PLOF: see above    ASSESSMENT:  Pt received in bed in NAD, agreeable to participate. Pt reports 0/10 pain to his RLE and now has his LLE prosthetic and is agreeable to don for gait training. Pt is mod ind with donning his leg with increased time to complete, setup given for convenience. Once L prosthetic leg and R shoe are donned, pt is able to stand up to RW with supervision and progresses to ambulation in the hallway. Pt tolerates approx 50 ft of gait training before needing to sit for rest break no LOB noted and pt able to safely manage the RW.  Pt reports increased pain to his L residual limb as this is the first time in 2 weeks he has donned his leg and reports he needs to get used to having the weight of the prosthesis again. Following mobility, pt noted to be SOB with -175 bpm, pt educated on PLB and resting to bring heart rate back down, nurse notified. At end of session, pt positioned for comfort up in recliner to eat breakfast and with all needs met. Will continue to follow per POC for progress towards goals. At this time pt would be safe to return home with [facility] PT and family support as needed upon discharge. Progression toward goals:   [x]      Improving appropriately and progressing toward goals  []      Improving slowly and progressing toward goals  []      Not making progress toward goals and plan of care will be adjusted     PLAN:  Patient continues to benefit from skilled intervention to address the above impairments. Continue treatment per established plan of care. Discharge Recommendations:  Home Health  Further Equipment Recommendations for Discharge:  N/A     SUBJECTIVE:   Patient stated want me to put my leg on?    OBJECTIVE DATA SUMMARY:   Critical Behavior:  Neurologic State: Alert  Orientation Level: Oriented X4  Cognition: Follows commands  Safety/Judgement: Fall prevention  Functional Mobility Training:  Bed Mobility:     Supine to Sit: Modified independent  Sit to Supine: Modified independent  Scooting: Modified independent         Transfers:  Sit to Stand: Supervision  Stand to Sit: Supervision    Balance:  Sitting: Intact  Standing: Intact; With support     Ambulation/Gait Training:  Distance (ft): 50 Feet (ft)  Assistive Device: Walker, rolling  Ambulation - Level of Assistance: Stand-by assistance        Gait Abnormalities: Decreased step clearance              Speed/Suzanna: Pace decreased (<100 feet/min)  Step Length: Right shortened;Left shortened        Pain:  Pain level pre-treatment: 0/10  Pain level post-treatment: 0/10   Pain Intervention(s): Medication (see MAR);  Rest,  Repositioning   Response to intervention: Nurse notified     Activity Tolerance: Good    Please refer to the flowsheet for vital signs taken during this treatment. After treatment:   [] Patient left in no apparent distress sitting up in chair  [] Patient left in no apparent distress in bed  [] Call bell left within reach  [] Nursing notified  [] Caregiver present  [] Bed alarm activated  [] SCDs applied      COMMUNICATION/EDUCATION:   []         Role of Physical Therapy in the acute care setting. []         Fall prevention education was provided and the patient/caregiver indicated understanding. []         Patient/family have participated as able in working toward goals and plan of care. []         Patient/family agree to work toward stated goals and plan of care. []         Patient understands intent and goals of therapy, but is neutral about his/her participation.   []         Patient is unable to participate in stated goals/plan of care: ongoing with therapy staff.  []         Other:        Kassi Laurent   Time Calculation: 24 mins

## 2021-04-11 NOTE — ROUTINE PROCESS
Bedside and Verbal shift change report given to [address] [place] [address] by Anaya Diaz. Report included the following information SBAR, Kardex and Cardiac Rhythm AFib.

## 2021-04-12 VITALS
OXYGEN SATURATION: 98 % | BODY MASS INDEX: 27.77 KG/M2 | WEIGHT: 194 LBS | SYSTOLIC BLOOD PRESSURE: 120 MMHG | TEMPERATURE: 99 F | RESPIRATION RATE: 18 BRPM | HEART RATE: 130 BPM | DIASTOLIC BLOOD PRESSURE: 77 MMHG | HEIGHT: 70 IN

## 2021-04-12 PROCEDURE — 74011250637 HC RX REV CODE- 250/637: Performed by: INTERNAL MEDICINE

## 2021-04-12 PROCEDURE — 97116 GAIT TRAINING THERAPY: CPT

## 2021-04-12 PROCEDURE — 74011250637 HC RX REV CODE- 250/637: Performed by: SURGERY

## 2021-04-12 PROCEDURE — 74011250637 HC RX REV CODE- 250/637: Performed by: PHYSICIAN ASSISTANT

## 2021-04-12 PROCEDURE — 2709999900 HC NON-CHARGEABLE SUPPLY

## 2021-04-12 RX ORDER — METOPROLOL TARTRATE 50 MG/1
50 TABLET ORAL 2 TIMES DAILY
Qty: 60 TAB | Refills: 2 | Status: SHIPPED | OUTPATIENT
Start: 2021-04-12 | End: 2021-04-20 | Stop reason: DRUGHIGH

## 2021-04-12 RX ORDER — DILTIAZEM HYDROCHLORIDE 180 MG/1
180 CAPSULE, COATED, EXTENDED RELEASE ORAL DAILY
Qty: 30 CAP | Refills: 1 | Status: SHIPPED | OUTPATIENT
Start: 2021-04-13 | End: 2021-06-07 | Stop reason: SDUPTHER

## 2021-04-12 RX ORDER — DILTIAZEM HYDROCHLORIDE 120 MG/1
120 CAPSULE, COATED, EXTENDED RELEASE ORAL
Qty: 30 CAP | Refills: 1 | Status: SHIPPED | OUTPATIENT
Start: 2021-04-12 | End: 2021-06-07 | Stop reason: SDUPTHER

## 2021-04-12 RX ADMIN — OXYCODONE HYDROCHLORIDE AND ACETAMINOPHEN 2 TABLET: 5; 325 TABLET ORAL at 09:17

## 2021-04-12 RX ADMIN — DILTIAZEM HYDROCHLORIDE 180 MG: 180 CAPSULE, COATED, EXTENDED RELEASE ORAL at 09:17

## 2021-04-12 RX ADMIN — APIXABAN 5 MG: 5 TABLET, FILM COATED ORAL at 09:17

## 2021-04-12 RX ADMIN — METOPROLOL TARTRATE 50 MG: 50 TABLET, FILM COATED ORAL at 09:17

## 2021-04-12 RX ADMIN — GABAPENTIN 400 MG: 300 CAPSULE ORAL at 09:17

## 2021-04-12 RX ADMIN — ASPIRIN 81 MG CHEWABLE TABLET 81 MG: 81 TABLET CHEWABLE at 09:17

## 2021-04-12 RX ADMIN — FUROSEMIDE 20 MG: 20 TABLET ORAL at 09:17

## 2021-04-12 RX ADMIN — THERA TABS 1 TABLET: TAB at 09:17

## 2021-04-12 RX ADMIN — LOSARTAN POTASSIUM 100 MG: 50 TABLET, FILM COATED ORAL at 09:17

## 2021-04-12 NOTE — ADT AUTH CERT NOTES
4/6 - 4/8    Utilization Reviews       [facility] Clinical Care Day 8 by Yomaira Unger       Review Status Review Entered   In Primary 4/12/2021 12:44      Criteria Review   Clinical review for 4/7/21  Care Day 8     98.3-111/63-84-18-98% RA  Assessment :     59-year-old male with history of CAD status post stenting of circumflex and RCA in 2017, HLD, peripheral vascular disease status post left AKA and intermittent right lower extremity claudication who was admitted on 3/31 to vascular surgery service with right calf and leg pain:     1-Persistent leukocytosis: Possibly reactive/ post procedure-less likely an infectious process (with no respiratory, GI,  or incision area issues)-15.9K today (T-max 100.4 the day after procedure 4/3)-afebrile now  --> WBC down to 12 K  -Blood culture 4/4 x 1 set negative at 3 days  -Urine culture 4/4 negative   -Chest x-ray unremarkable for any acute findings     2-Right pretibial erythema and warmth-patient reports it was present before but slightly more now after procedure- Improving  -Quite tender, and due to recent infarct/blockage  -Negative right lower extremity venous Doppler DVT     3-Right lower extremity bypass occlusion  -S/P R thrombectomy with axillofemoral bypass, femoral popliteal bypass, revision of right femoral artery and patch angioplasty, right leg angiogram with balloon angioplasty of right popliteal artery- 4/2        Recommendations:     Continue to monitor off antibiotics  Anticoagulation as well as antiplatelet therapy per cardiology and vascular  Continue supportive care     Cardiology progress note:  Assessment:      -Presented for angiography R leg due to R leg arterial occlusion - occluded R leg bypass graft.  Hx peripheral vascular disease s/p left AKA.   -S/p thrombectomy R axillofemoral bypass, thrombectomy R femorpopliteal bypass, revision R femoral artery with patch angioplasty by Dr. Lizeth Bernard 4/1/2021  -S/p thrombectomy R leg femoropopliteal bypass and tibial runoff by Dr. Babs Frazier 4/2/2021.  -Consulted due to abnormal EKG, preop  -Paroxysmal atrial fibrillation, historically on Coumadin for anticoagulation, s/p BERRY/cardioversion 4/18/2017  -Hx Nonischemic cardiomyopathy  -Echo 10/2017 with findings as follows: EF 55%, hypokinesis of basal inferior walls, normal LV wall thickness, normal diastolic function, mildly dilated RA, trivial mitral regurgitation  -CAD, cardiac cath 3/8/2017 with findings as follows:  EF around 30%, no diagnostic segmental wall motion abnormality  LM: Diffuse 30%  LAD: Diffuse 30%  Diagonal branches: Diffuse 30%  Cx: Proximal 70%; trifurcation healed ruptured plaque with residual 90% (RYLEE 3)  OM branches: Angiographically normal  RCA: Diffuse 50% with focal mid 90%  -S/p successful stenting of Cx and RCA to 0% during subsequent cardiac cath 3/15/2017- stable no anginal symptoms   -Nuclear stress test 11/2020 was low-risk, EF 51%  -Dyslipidemia     Previously followed by Dr. Jorgito Fregoso, first appointment with Dr. Grace Escobar 4/27/2021     Plan:      Seen and evaluated. Cinthia Avendano with below. Nikita Arce has long history of PAF.  Would continue rate control as needed.  When able to take oral anticoagulation, would resume.  No new cardiac recommendations at this time.     -Will decrease Cardizem infusion to 10 mg/hr, and start Cardizem IR with plan to wean off Cardizem infusion over next 24 hours.  Remains in afib with rates currently 60's-70's.  Continued on PO Lopressor, dose increased yesterday evening.  -Continued on Heparin infusion for anticoagulation, would recommend switch to oral anticoagulation when okay from vascular surgery standpoint.     aPTT >180 wbc 13.7 rbc 2.51 hgb 7.9 hct 23.1 plt 434   Complete bedrest  Cardiac diet  Aspirin 81mg po x1  Lipitor 80mg po x1  Dextrose 5% cont iv 100cc/hr  Lasix 20mg po x1  Neurontin 400mg po x3  Dilaudid 1mg iv x1  Cozaar 100mg po x1  Metoprolol 50mg po x2  Percocet 5-325mg po x5  Cardizem drip  Cardizem 60mg po x2  Heparin drip      Additional Clinical Care Day 7 by Lissy Robbins       Review Status Review Entered   In Primary 4/12/2021 12:36      Criteria Review   Review for 4/6/21  Care Day 7     98.3-113/-33-98% RA     Assessment :     49-year-old male with history of CAD status post stenting of circumflex and RCA in 2017, HLD, peripheral vascular disease status post left AKA and intermittent right lower extremity claudication who was admitted on 3/31 to vascular surgery service with right calf and leg pain:     1-Persistent leukocytosis: Possibly reactive/ post procedure-less likely an infectious process (with no respiratory, GI,  or incision area issues)-15.9K today (T-max 100.4 the day after procedure 4/3)-afebrile now  -Blood culture 4/4 x 1 set negative at 2 days  -Urine culture 4/4 negative   -Chest x-ray unremarkable for any acute findings     2-Right pretibial erythema and warmth-patient reports it was present before but slightly more now after procedure  -Quite tender, and due to recent infarct/blockage  -Negative right lower extremity venous Doppler DVT     3-Right lower extremity bypass occlusion  -S/P R thrombectomy with axillofemoral bypass, femoral popliteal bypass, revision of right femoral artery and patch angioplasty, right leg angiogram with balloon angioplasty of right popliteal artery- 4/2  Recommendations:  Continue to monitor off antibiotics  Will follow up on blood cultures in progress  Anticoagulation as well as antiplatelet therapy per cardiology and vascular  Continue supportive care     Cardiology progress note:  Assessment:      -Presented for angiography R leg due to R leg arterial occlusion - occluded R leg bypass graft.  Hx peripheral vascular disease s/p left AKA.   -S/p thrombectomy R axillofemoral bypass, thrombectomy R femorpopliteal bypass, revision R femoral artery with patch angioplasty by Dr. Evelin Araujo 4/1/2021  -S/p thrombectomy R leg femoropopliteal bypass and tibial runoff by Dr. Dee Bello 4/2/2021.  -Consulted due to abnormal EKG, preop  -Paroxysmal atrial fibrillation, historically on Coumadin for anticoagulation, s/p BERRY/cardioversion 4/18/2017  -Hx Nonischemic cardiomyopathy  -Echo 10/2017 with findings as follows: EF 55%, hypokinesis of basal inferior walls, normal LV wall thickness, normal diastolic function, mildly dilated RA, trivial mitral regurgitation  -CAD, cardiac cath 3/8/2017 with findings as follows:                   EF around 30%, no diagnostic segmental wall motion abnormality  LM: Diffuse 30%  LAD: Diffuse 30%  Diagonal branches: Diffuse 30%  Cx: Proximal 70%; trifurcation healed ruptured plaque with residual 90% (RYLEE 3)  OM branches: Angiographically normal  RCA: Diffuse 50% with focal mid 90%  -S/p successful stenting of Cx and RCA to 0% during subsequent cardiac cath 3/15/2017- stable no anginal symptoms   -Nuclear stress test 11/2020 was low-risk, EF 51%  -Dyslipidemia     Previously followed by Dr. Salo Lira, first appointment with Dr. Des Dee 4/27/2021     Plan:      -Pt with afib on telemetry overnight with rates up to ~200, started on Cardizem infusion overnight, also given IV Digoxin 500 mcg x 1.  Remains in afib with rates low 100's, continue current management.  -Continued on Heparin infusion for anticoagulation, would recommend switch to oral anticoagulation by time of discharge.   -ID following, appreciate assistance.     Staff addendum:   A.fib/RVR noted overnight, now on diltiazem drip 15mg/hr.  I will increase lopressor to 50 mg BID, digoxin given last night, on heparin drip.  If unable to control overnight, reasonable to start amiodarone since new onset.  NOAC or coumadin if ok with surgery and if no further planned procedures.     Wbc 15 rbc 2.98 hgb 9.1 hct 26.7 glu 124 Ca 8.3 aPTT 78.8   Complete bedrest  Cardiac diet  Aspirin 81mg po x1  Lipitor 80mg po x1  Dextrose 5% cont iv 100cc/hr  Lasix 20mg po x1  Neurontin 400mg po x3  Dilaudid 1mg iv x1  Cozaar 100mg po x1  Metoprolol 50mg po x1  Percocet 5-325mg po x4  Digoxin 250mcg iv x1 and 500mcg iv x1  Cardizem drip  Heparin drip  Metoprolol 25mg po x1      Cardiovascular Surgery or Procedure [facility] - Care Day 9 (4/8/2021) by Gretta Starks       Review Status Review Entered   Completed 4/9/2021 12:44      Criteria Review      Care Day: 9 Care Date: 4/8/2021 Level of Care: Step Down    Guideline Day 3    Level Of Care    ( ) * Activity level acceptable    4/9/2021 12:44:40 EDT by Clive Steward      complete bedrest    Clinical Status    ( ) * Hemodynamic stability    4/9/2021 12:44:40 EDT by Clive Steward          ( ) * Cardiovascular status acceptable    ( ) * Cardiac inflammation absent or controlled (eg, pericarditis)    ( ) * Operative site and other wounds acceptable    ( ) * Vascular, soft tissue, and wound status acceptable    (X) * No blood loss, or problem resolved    4/9/2021 12:44:40 EDT by Clive Steward      no blood loss    ( ) * No infection, or status acceptable    ( ) * Pain and nausea absent or adequately managed    ( ) * General Discharge Criteria met    Interventions    ( ) * Vascular access established or not needed    ( ) * Arteriovenous fistula absent or functioning adequately    (X) * Intake acceptable    4/9/2021 12:44:40 EDT by Clive Steward      cardiac diet    ( ) * No inpatient interventions needed    * Milestone   Additional Notes   98.6-128/-09-99% RA   Assessment/plan:   -Presented for angiography R leg due to R leg arterial occlusion - occluded R leg bypass graft.  Hx peripheral vascular disease s/p left AKA.    -S/p thrombectomy R axillofemoral bypass, thrombectomy R femorpopliteal bypass, revision R femoral artery with patch angioplasty by Dr. Kristin Gilbert 4/1/2021   -S/p thrombectomy R leg femoropopliteal bypass and tibial runoff by Dr. Kristin Gilbert 4/2/2021.   -Consulted due to abnormal EKG, preop   -Echo 4/2/2021: EF 55-60%, normal LV cavity size, wall thickness and wall motion, age-appropriate LV diastolic function   -Paroxysmal atrial fibrillation with RVR.  Previously on Coumadin for anticoagulation, s/p BERRY/cardioversion 4/18/2017   -Hx Nonischemic cardiomyopathy   -Echo 10/2017 with findings as follows: EF 55%, hypokinesis of basal inferior walls, normal LV wall thickness, normal diastolic function, mildly dilated RA, trivial mitral regurgitation   -CAD, cardiac cath 3/8/2017 with findings as follows:   EF around 30%, no diagnostic segmental wall motion abnormality   LM: Diffuse 30%   LAD: Diffuse 30%   Diagonal branches: Diffuse 30%   Cx: Proximal 70%; trifurcation healed ruptured plaque with residual 90% (RYLEE 3)   OM branches: Angiographically normal   RCA: Diffuse 50% with focal mid 90%   -S/p successful stenting of Cx and RCA to 0% during subsequent cardiac cath 3/15/2017- stable no anginal symptoms    -Nuclear stress test 11/2020 was low-risk, EF 51%   -Dyslipidemia   -Will discontinue Cardizem infusion, increase frequency of Cardizem IR.  Plan to consolidate Cardizem dosing tomorrow as long as rates remain stable.  Continue PO Lopressor.   -Continued on Heparin infusion for anticoagulation, agree with plan to switch to Eliquis by time of discharge per vascular surgery team.   -Continued on ASA, Lipitor, Cozaar.   -Continued on PO Lasix.    Pain improved while at rest, does have pain when he steps on his foot. Tolerating diet vital signs are stable   Continue with physical therapy   We will transition to Eliquis.       aPTT 87.8 glu 125 Ca 8.3    consult cardiology   eliquis 5mg po x1   aspirin 81mg po x1   Lipitor 80mg po x1   Dextrose 5% cont iv 100cc/hr   Lasix 20mg po x1   Neurontin 400mg po x3   Dilaudid 1mg iv x2   Cozaar 100mg po x1   Metoprolol 50mg po x2   Percocet 5-325mg po x2   Cardizem 60mg po x4   Heparin drip

## 2021-04-12 NOTE — DISCHARGE INSTRUCTIONS
Patient Education        Aortobifemoral Bypass: What to Expect at Home  Your Recovery  An aortobifemoral bypass is surgery to redirect blood around narrowed or blocked blood vessels in your belly or groin. The surgery is done to increase blood flow to the legs. This may relieve symptoms such as leg pain, numbness, and cramping. You may be able to walk longer distances without leg pain. The doctor used a man-made blood vessel, called a graft, to bypass the narrowed or blocked blood vessels. The graft will carry blood from the aorta to the femoral artery in each thigh. The aorta is the large blood vessel that carries blood from the heart to the blood vessels in the belly. The femoral arteries are large blood vessels that carry blood from the blood vessels in the belly to the legs. You can expect your belly and groin to be sore for several weeks. You will probably feel more tired than usual for several weeks after surgery. You may be able to do many of your usual activities after 4 to 6 weeks. But you will probably need 2 to 3 months to fully recover, especially if you usually do a lot of physical activities. This care sheet gives you a general idea about how long it will take for you to recover. But each person recovers at a different pace. Follow the steps below to feel better as quickly as possible. How can you care for yourself at home? Activity    · Rest when you feel tired. Getting enough sleep will help you recover.     · Try to walk each day. Start by walking a little more than you did the day before. Bit by bit, increase the amount you walk. Walking boosts blood flow and helps prevent pneumonia and constipation.     · Avoid strenuous activities, such as bicycle riding, jogging, weight lifting, or aerobic exercise, for 6 weeks or until your doctor says it is okay.     · For 6 weeks, avoid lifting anything that would make you strain.  This may include a child, heavy grocery bags and milk containers, a heavy briefcase or backpack, cat litter or dog food bags, or a vacuum .     · Hold a pillow over your belly incision when you cough or take deep breaths. This will support your belly and decrease your pain.     · Do breathing exercises at home as instructed by your doctor. This will help prevent pneumonia.     · Ask your doctor when you can drive again.     · You will probably need to take at least 4 to 6 weeks off from work. It depends on the type of work you do and how you feel.     · You may shower as usual. Pat the incisions dry. Do not take a bath for the first 2 weeks, or until your doctor tells you it is okay.     · Ask your doctor when it is okay for you to have sex. Diet    · You can eat your normal diet. If your stomach is upset, try bland, low-fat foods like plain rice, broiled chicken, toast, and yogurt.     · Drink plenty of fluids (unless your doctor tells you not to).     · You may notice that your bowel movements are not regular right after your surgery. This is common. Try to avoid constipation and straining with bowel movements. You may want to take a fiber supplement every day. If you have not had a bowel movement after a couple of days, ask your doctor about taking a mild laxative. Medicines    · Your doctor will tell you if and when you can restart your medicines. He or she will also give you instructions about taking any new medicines.     · If you take aspirin or some other blood thinner, ask your doctor if and when to start taking it again. Make sure that you understand exactly what your doctor wants you to do.     · Be safe with medicines. Take your medicines exactly as prescribed. Call your doctor if you think you are having a problem with your medicine.     · Take pain medicines exactly as directed. ? If the doctor gave you a prescription medicine for pain, take it as prescribed.   ? If you are not taking a prescription pain medicine, ask your doctor if you can take an over-the-counter medicine.     · If you think your pain medicine is making you sick to your stomach:  ? Take your medicine after meals (unless your doctor has told you not to). ? Ask your doctor for a different pain medicine.     · If your doctor prescribed antibiotics, take them as directed. Do not stop taking them just because you feel better. You need to take the full course of antibiotics.     · Your doctor may prescribe a blood thinner when you go home. This helps prevent blood clots. Be sure you get instructions about how to take your medicine safely. Blood thinners can cause serious bleeding problems. Incision care    · If you have strips of tape on the incisions, leave the tape on for a week or until it falls off.     · Wash the area daily with warm, soapy water, and pat it dry. Don't use hydrogen peroxide or alcohol, which can slow healing. You may cover the area with a gauze bandage if it weeps or rubs against clothing. Change the bandage every day.     · Keep the area clean and dry. Follow-up care is a key part of your treatment and safety. Be sure to make and go to all appointments, and call your doctor if you are having problems. It's also a good idea to know your test results and keep a list of the medicines you take. When should you call for help? Call 911 anytime you think you may need emergency care. For example, call if:    · You passed out (lost consciousness).     · You have severe trouble breathing.     · You have sudden chest pain and shortness of breath, or you cough up blood.     · You have severe pain in your belly.     · You have chest pain or pressure. This may occur with:  ? Sweating. ? Shortness of breath. ? Nausea or vomiting. ? Pain that spreads from the chest to the neck, jaw, or one or both shoulders or arms. ? Dizziness or lightheadedness. ? A fast or uneven pulse. After calling 911, chew 1 adult-strength aspirin. Wait for an ambulance. Do not try to drive yourself. Call your doctor now or seek immediate medical care if:    · You have severe pain in your leg, or it becomes cold, pale, blue, tingly, or numb.     · You are sick to your stomach or cannot keep fluids down.     · You have pain that does not get better after you take pain medicine.     · You have a fever over 100°F.     · You have loose stitches, or your incisions come open.     · Bright red blood has soaked through the bandage over any of your incisions.     · You have signs of infection, such as:  ? Increased pain, swelling, warmth, or redness. ? Red streaks leading from the incision. ? Pus draining from the incision. ? Swollen lymph nodes in your neck, armpits, or groin. ? A fever.     · You have signs of a blood clot, such as:  ? Pain in your calf, back of the knee, thigh, or groin. ? Redness and swelling in your leg or groin. Watch closely for any changes in your health, and be sure to contact your doctor if:    · You do not have a bowel movement after taking a laxative. Where can you learn more? Go to http://www.gray.com/  Enter W281 in the search box to learn more about \"Aortobifemoral Bypass: What to Expect at Home. \"  Current as of: August 31, 2020               Content Version: 12.8  © 2006-2021 "[employer]. Care instructions adapted under license by [employer] (which disclaims liability or warranty for this information). If you have questions about a medical condition or this instruction, always ask your healthcare professional. [place] [address] any warranty or liability for your use of this information. Patient Education        Learning About Atrial Fibrillation  What is atrial fibrillation? Atrial fibrillation (say \"AY-tree-cherrie sht-npyp-TJJ-shun\") is a common type of irregular heartbeat (arrhythmia). Normally, the heart beats in a strong, steady rhythm.  In atrial fibrillation, a problem with the heart's electrical system causes the two upper chambers of the heart (called the atria) to quiver, or fibrillate. Atrial fibrillation can be dangerous. This is because if the heartbeat isn't strong and steady, blood can collect, or pool, in the atria. And pooled blood is more likely to form clots. Clots can travel to the brain, block blood flow, and cause a stroke. Atrial fibrillation can also lead to heart failure. This condition also upsets the normal rhythm between the atria and the lower chambers of the heart. (These chambers are called the ventricles.) The ventricles may beat fast and without a regular rhythm. What are the symptoms? Some people feel symptoms when they have episodes of atrial fibrillation. But other people don't notice any symptoms. If you have symptoms, you may feel:  · A fluttering, racing, or pounding feeling in your chest called palpitations. · Weak or tired. · Dizzy or lightheaded. · Short of breath. · Chest pain. · Confused. You may notice signs of atrial fibrillation when you check your pulse. Your pulse may seem uneven or fast.  What can you expect when you have atrial fibrillation? At first, spells of atrial fibrillation may come on suddenly and last a short time. They may go away on their own or with treatment. Over time, the spells may last longer and occur more often. They often don't go away on their own. How is it treated? Treatments can help you feel better and prevent future problems, especially stroke and heart failure. Your treatment will depend on the cause of your atrial fibrillation, your symptoms, and your risk for stroke. Types of treatment include:  · Heart rate treatment. Medicine may be used to slow your heart rate. Your heartbeat may still be irregular. But these medicines keep your heart from beating too fast. They may also help relieve symptoms. · Heart rhythm treatment.  Different treatments may be used to try to stop atrial fibrillation and keep it from returning. They can also relieve symptoms. These treatments include medicine, electrical cardioversion to shock the heart back to a normal rhythm, a procedure called catheter ablation, and heart surgery. · Stroke prevention. You and your doctor can decide how to lower your risk. You may decide to take a blood-thinning medicine called an anticoagulant. What is a heart-healthy lifestyle for atrial fibrillation? You can live well and help manage atrial fibrillation by having a heart-healthy lifestyle. This lifestyle may help reduce how often you have episodes of atrial fibrillation. Don't smoke. Avoid secondhand smoke too. Quitting smoking is the best thing you can do for your heart. Be active. Talk to your doctor about what type and level of exercise is safe for you. Eat a heart-healthy diet. These foods include vegetables, fruits, nuts, beans, lean meat, fish, and whole grains. Limit sodium, alcohol, and sugar. Avoid alcohol if it triggers symptoms. Stay at a healthy weight. Lose weight if you need to. Losing weight can help relieve symptoms. Manage other health problems. These problems include diabetes, high blood pressure, and high cholesterol. If you think you may have a problem with alcohol or drug use, talk to your doctor. Manage stress. Options like yoga, biofeedback, and meditation may help. Where can you learn more? Go to http://www.gray.com/  Enter L274 in the search box to learn more about \"Learning About Atrial Fibrillation. \"  Current as of: August 31, 2020               Content Version: 12.8  © 9155-8454 [employer]. Care instructions adapted under license by [employer] (which disclaims liability or warranty for this information).  If you have questions about a medical condition or this instruction, always ask your healthcare professional. [place] [address] any warranty or liability for your use of this information.

## 2021-04-12 NOTE — PROGRESS NOTES
Discharge order noted for today. No hh ordered, spoke with pt, he states  he does not need. He amb 100 ft with therapy. pt agreeable to the transition plan today. Transport has been arranged through, pt's family will transport home. D[facility] Updated bedside RN, christo, to the transition plan.   Discharge information has been documented on the AVS.       Mina Euceda RN CM

## 2021-04-12 NOTE — PROGRESS NOTES
Problem: Mobility Impaired (Adult and Pediatric)  Goal: *Acute Goals and Plan of Care (Insert Text)  Description: Physical Therapy Goals  Initiated 4/5/2021 and to be accomplished within 7 day(s)  1. Patient will move from supine to sit and sit to supine , scoot up and down, and roll side to side in bed with modified independence. 2.  Patient will transfer from bed to chair and chair to bed with minimal assistance using the least restrictive device. 3.  Patient will perform sit to stand with minimal assistance. 4.  Patient will ambulate with minimal assistance for 10 feet with the least restrictive device. 5.  Patient will ascend/descend stairs pending progress and need for discharge. 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 Outcome: Progressing Towards Goal     PHYSICAL THERAPY TREATMENT    Patient: Marsha Juarez (84 y.o. male)  Date: 4/12/2021  Diagnosis: Atherosclerosis of native artery of right lower extremity with intermittent claudication ([facility]) [I70.211]  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[address] [I70.201] <principal problem not specified>  Procedure(s) (LRB):  RIGHT LEG THROMBECTOMY (Right) 10 Days Post-Op  Precautions: Fall, Skin  PLOF: see abvoe    ASSESSMENT:  Pt seen in NAD, agreeable to PT at this time. Pt is able to perform bed mobility and sit <> stand up to RW with mod at this time. Pt first completed gait trial without his  prosthetic L leg and amb approx 50 ft with hop-to gait pattern on the R and close SBA/CGA for safety as pt fatigued easily and required sitting rest break following trial. Pt then donned prosthetic LLE with independence and amb approx 100 ft in the hallway with improved tolerance to wearing his leg than last session yesterday. Pt demonstrates safe navigation with RW and shows no LOB. At end of session, pt continues to be limited by SOB 2/2 deconditioning but improves with sitting rest break. Pt left sitting up in chair with all needs met and call bell within reach. Recommend return home with [facility] and family support. Progression toward goals:   [x]      Improving appropriately and progressing toward goals  []      Improving slowly and progressing toward goals  []      Not making progress toward goals and plan of care will be adjusted     PLAN:  Patient continues to benefit from skilled intervention to address the above impairments. Continue treatment per established plan of care. Discharge Recommendations:  Home Health  Further Equipment Recommendations for Discharge:  N/A     SUBJECTIVE:   Patient stated I hope I am going home today.     OBJECTIVE DATA SUMMARY:   Critical Behavior:  Neurologic State: Alert, Appropriate for age, Eyes open spontaneously  Orientation Level: Oriented X4  Cognition: Appropriate safety awareness, Follows commands, Appropriate decision making  Safety/Judgement: Fall prevention  Functional Mobility Training:  Bed Mobility:     Supine to Sit: Modified independent  Sit to Supine: Modified independent  Scooting: Modified independent         Transfers:  Sit to Stand: Modified independent  Stand to Sit: Modified independent             Balance:  Sitting: Intact  Standing: Intact; With support       Ambulation/Gait Training:  Distance (ft): 100 Feet (ft)  Assistive Device: Walker, rolling  Ambulation - Level of Assistance: Supervision        Gait Abnormalities: Decreased step clearance    Speed/Suzanna: Pace decreased (<100 feet/min)  Step Length: Right shortened;Left shortened    Pain:  Pain level pre-treatment: 4/10  Pain level post-treatment: 4/10   Pain Intervention(s): Medication (see MAR); Rest, Repositioning  Response to intervention: Nurse notified,    Activity Tolerance:   Good    Please refer to the flowsheet for vital signs taken during this treatment.   After treatment:   [] Patient left in no apparent distress sitting up in chair  [x] Patient left in no apparent distress in bed  [x] Call bell left within reach  [x] Nursing notified  [] Caregiver present  [] Bed alarm activated  [] SCDs applied      COMMUNICATION/EDUCATION:   [x]         Role of Physical Therapy in the acute care setting. [x]         Fall prevention education was provided and the patient/caregiver indicated understanding. [x]         Patient/family have participated as able in working toward goals and plan of care. [x]         Patient/family agree to work toward stated goals and plan of care. []         Patient understands intent and goals of therapy, but is neutral about his/her participation.   []         Patient is unable to participate in stated goals/plan of care: ongoing with therapy staff.  []         Other:        Selam Anna   Time Calculation: 23 mins

## 2021-04-12 NOTE — PROGRESS NOTES
Sitting up eating lunch at bedside without complaint  Wishes to go home today  Reviewed his medications and prescribed per cardiology's recommendations  We will follow up with myself and cardiology as an outpatient  Plan for discharge today

## 2021-04-12 NOTE — PROGRESS NOTES
conducted a Follow up consultation and Spiritual Assessment for Carolyn Landis, who is a 59 y.o.,male. The  provided the following Interventions:  Continued the relationship of care and support. Patient has the most pleasant personality. He remains positive in life despite his amputated limb. Listened empathically. Offered assurance of continued prayer on patient's behalf. Chart reviewed. The following outcomes were achieved:  Patient expressed gratitude for 's visit. Assessment:  There are no further spiritual or Adventism issues which require Spiritual Care Services interventions at this time. Plan:  Chaplains will continue to follow and will provide pastoral care on an as needed/requested basis.  recommends bedside caregivers page  on duty if patient shows signs of acute spiritual or emotional distress.      [facility], [address]   [phone]

## 2021-04-12 NOTE — PROGRESS NOTES
Bedside and Verbal shift change report given to [address] [place] [address] St (oncoming nurse) by Lizeth Vera (offgoing nurse). Report included the following information SBAR, Kardex and Cardiac Rhythm AFib.     0845:Patient ambulated with PT.    1330:Discharge medications reviewed with patient and appropriate educational materials and side effects teaching were provided. Current Discharge Medication List      START taking these medications    Details   apixaban (ELIQUIS) 5 mg tablet Take 1 Tab by mouth two (2) times a day. Qty: 60 Tab, Refills: 5      !! dilTIAZem ER (CARDIZEM CD) 120 mg capsule Take 1 Cap by mouth nightly. Qty: 30 Cap, Refills: 1      !! dilTIAZem ER (CARDIZEM CD) 180 mg capsule Take 1 Cap by mouth daily. Qty: 30 Cap, Refills: 1       !! - Potential duplicate medications found. Please discuss with provider. CONTINUE these medications which have CHANGED    Details   metoprolol tartrate (LOPRESSOR) 50 mg tablet Take 1 Tab by mouth two (2) times a day. Qty: 60 Tab, Refills: 2         CONTINUE these medications which have NOT CHANGED    Details   furosemide (LASIX) 20 mg tablet TAKE 1 TABLET DAILY  Qty: 90 Tab, Refills: 1    Associated Diagnoses: Benign hypertensive heart disease with systolic congestive heart failure, NYHA class 2 ([address] Chronic systolic heart failure (HCC)      atorvastatin (LIPITOR) 80 mg tablet TAKE 1 TABLET ONCE DAILY  Qty: 90 Tab, Refills: 3      losartan (COZAAR) 100 mg tablet TAKE 1 TABLET ONCE DAILY  Qty: 90 Tab, Refills: 3      aspirin 81 mg chewable tablet Take 1 Tab by mouth daily. Qty: 90 Tab, Refills: 3      gabapentin (NEURONTIN) 400 mg capsule take 1 capsule by mouth three times a day  Qty: 270 Cap, Refills: 0      multivitamin (ONE A DAY) tablet Take 1 Tab by mouth daily. STOP taking these medications       clopidogreL (PLAVIX) 75 mg tab Comments:   Reason for Stopping:           If you experience chest pain call 911. Do not drive yourself.

## 2021-04-15 NOTE — TELEPHONE ENCOUNTER
pls verify if this was discontinued by dr. Keenan Payan? I do not see this in recent discharge summary.

## 2021-04-20 ENCOUNTER — OFFICE VISIT (OUTPATIENT)
Dept: FAMILY MEDICINE CLINIC | Age: 61
End: 2021-04-20
Payer: COMMERCIAL

## 2021-04-20 VITALS
RESPIRATION RATE: 16 BRPM | TEMPERATURE: 97.1 F | WEIGHT: 194 LBS | DIASTOLIC BLOOD PRESSURE: 76 MMHG | SYSTOLIC BLOOD PRESSURE: 138 MMHG | BODY MASS INDEX: 27.77 KG/M2 | OXYGEN SATURATION: 97 % | HEIGHT: 70 IN | HEART RATE: 85 BPM

## 2021-04-20 DIAGNOSIS — L02.93 CARBUNCLE: ICD-10-CM

## 2021-04-20 DIAGNOSIS — I48.0 PAROXYSMAL ATRIAL FIBRILLATION (HCC): ICD-10-CM

## 2021-04-20 DIAGNOSIS — I70.213 ATHEROSCLEROSIS OF NATIVE ARTERY OF BOTH LOWER EXTREMITIES WITH INTERMITTENT CLAUDICATION (HCC): ICD-10-CM

## 2021-04-20 DIAGNOSIS — I73.9 PERIPHERAL ARTERY DISEASE (HCC): Primary | ICD-10-CM

## 2021-04-20 DIAGNOSIS — E78.5 DYSLIPIDEMIA: ICD-10-CM

## 2021-04-20 DIAGNOSIS — D62 ACUTE BLOOD LOSS AS CAUSE OF POSTOPERATIVE ANEMIA: ICD-10-CM

## 2021-04-20 PROCEDURE — 99214 OFFICE O/P EST MOD 30 MIN: CPT | Performed by: FAMILY MEDICINE

## 2021-04-20 RX ORDER — CEPHALEXIN 500 MG/1
500 CAPSULE ORAL 2 TIMES DAILY
Qty: 20 CAP | Refills: 0 | Status: SHIPPED | OUTPATIENT
Start: 2021-04-20 | End: 2021-04-26

## 2021-04-20 RX ORDER — METOPROLOL TARTRATE 25 MG/1
TABLET, FILM COATED ORAL
Qty: 180 TAB | Refills: 1 | Status: SHIPPED | OUTPATIENT
Start: 2021-04-20 | End: 2021-04-22 | Stop reason: DRUGHIGH

## 2021-04-20 NOTE — PROGRESS NOTES
Chief Complaint   Patient presents with   [facility] Follow Up     3/31-4/11- Thrombectomy

## 2021-04-20 NOTE — PATIENT INSTRUCTIONS
A Healthy Heart: Care Instructions  Your Care Instructions     Coronary artery disease, also called heart disease, occurs when a substance called plaque builds up in the vessels that supply oxygen-rich blood to your heart muscle. This can narrow the blood vessels and reduce blood flow. A heart attack happens when blood flow is completely blocked. A high-fat diet, smoking, and other factors increase the risk of heart disease. Your doctor has found that you have a chance of having heart disease. You can do lots of things to keep your heart healthy. It may not be easy, but you can change your diet, exercise more, and quit smoking. These steps really work to lower your chance of heart disease. Follow-up care is a key part of your treatment and safety. Be sure to make and go to all appointments, and call your doctor if you are having problems. It's also a good idea to know your test results and keep a list of the medicines you take. How can you care for yourself at home? Diet    · Use less salt when you cook and eat. This helps lower your blood pressure. Taste food before salting. Add only a little salt when you think you need it. With time, your taste buds will adjust to less salt.     · Eat fewer snack items, fast foods, canned soups, and other high-salt, high-fat, processed foods.     · Read food labels and try to avoid saturated and trans fats. They increase your risk of heart disease by raising cholesterol levels.     · Limit the amount of solid fat-butter, margarine, and shortening-you eat. Use olive, peanut, or canola oil when you cook. Bake, broil, and steam foods instead of frying them.     · Eat a variety of fruit and vegetables every day. Dark green, deep orange, red, or yellow fruits and vegetables are especially good for you. Examples include spinach, carrots, peaches, and berries.     · Foods high in fiber can reduce your cholesterol and provide important vitamins and minerals.  High-fiber foods include whole-grain cereals and breads, oatmeal, beans, brown rice, citrus fruits, and apples.     · Eat lean proteins. Heart-healthy proteins include seafood, lean meats and poultry, eggs, beans, peas, nuts, seeds, and soy products.     · Limit drinks and foods with added sugar. These include candy, desserts, and soda pop. Lifestyle changes    · If your doctor recommends it, get more exercise. Walking is a good choice. Bit by bit, increase the amount you walk every day. Try for at least 30 minutes on most days of the week. You also may want to swim, bike, or do other activities.     · Do not smoke. If you need help quitting, talk to your doctor about stop-smoking programs and medicines. These can increase your chances of quitting for good. Quitting smoking may be the most important step you can take to protect your heart. It is never too late to quit.     · Limit alcohol to 2 drinks a day for men and 1 drink a day for women. Too much alcohol can cause health problems.     · Manage other health problems such as diabetes, high blood pressure, and high cholesterol. If you think you may have a problem with alcohol or drug use, talk to your doctor. Medicines    · Take your medicines exactly as prescribed. Call your doctor if you think you are having a problem with your medicine.     · If your doctor recommends aspirin, take the amount directed each day. Make sure you take aspirin and not another kind of pain reliever, such as acetaminophen (Tylenol). When should you call for help? Call 911 if you have symptoms of a heart attack. These may include:    · Chest pain or pressure, or a strange feeling in the chest.     · Sweating.     · Shortness of breath.     · Pain, pressure, or a strange feeling in the back, neck, jaw, or upper belly or in one or both shoulders or arms.     · Lightheadedness or sudden weakness.     · A fast or irregular heartbeat.    After you call 911, the  may tell you to chew 1 adult-strength or 2 to 4 low-dose aspirin. Wait for an ambulance. Do not try to drive yourself. Watch closely for changes in your health, and be sure to contact your doctor if you have any problems. Where can you learn more? Go to http://www.gomes.com/  Enter F075 in the search box to learn more about \"A Healthy Heart: Care Instructions. \"  Current as of: August 31, 2020               Content Version: 12.8  © 2006-2021 [employer]. Care instructions adapted under license by [employer] (which disclaims liability or warranty for this information). If you have questions about a medical condition or this instruction, always ask your healthcare professional. [address] any warranty or liability for your use of this information.

## 2021-04-20 NOTE — PROGRESS NOTES
Chela Jacinto, 59 y.o.,  male    SUBJECTIVE  Ff-up admission PAD    Date of admission 3/31/2021-4/11/2021    Presented with RLE pain w/ known h/o PAD. underwent thrombectomy 4/1/2021   s/p AKA Left leg and complicated revasculariation (2017). His R groin is dry and clean, no pain. He has no pain on Right leg currently.       Afib- reviewed notes placed on cardizem, metoprolol and eliquis.  EF 55%    H/o CAD s/p angioplasty- continued on asa, BB.  plavix was discontinued Stress test 11/2020 low risk study.    upcoming appt with dr Stephanie Sylvester 4/27    HTN- currently on cozaar, metoprolol    Anemia- 14 pre op, 7.9 during discharge, denies melena, shortness of breath    Boil on R side buttock, mild pain and discharge        ROS:  See HPI, all others negative        Patient Active Problem List   Diagnosis Code    Benign hypertensive heart disease with systolic congestive heart failure, NYHA class 2 ([facility]) I11.0, I50.20    DDD (degenerative disc disease), lumbar M51.36    Erectile dysfunction N52.9    Spinal stenosis of lumbar region with radiculopathy M48.061, M54.16    Hereditary peripheral neuropathy G60.9    Aortoiliac occlusive disease ([address] I74.09    Atherosclerosis of native artery of both lower extremities with intermittent claudication ([address] I70.213    Peripheral artery disease ([address] I73.9    Coronary artery disease involving native coronary artery of native heart I25.10    Paroxysmal atrial fibrillation (HCC) I48.0    Acute blood loss as cause of postoperative anemia D62    Impaired mobility and ADLs Z74.09, Z78.9    Ischemic cardiomyopathy I25.5    Chronic systolic heart failure (HCC) I50.22    Carotid artery disease ([facility]) I77.9    Dyslipidemia E78.5    Euthyroid sick syndrome E07.81    Aftercare following surgery of the circulatory system Z48.812    Status post femoral-popliteal bypass surgery Z95.828    History of cardioversion Z98.890    Critical ischemia of lower extremity ([address] I70.229    History of vitamin D deficiency Z86.39    Pseudoaneurysm of femoral artery ([facility]) I72.4    Infection of vascular bypass graft ([facility] T82. 7XXA    Chronic anemia D64.9    Chronic ulcer of right heel ([facility]) L97.419    Ischemic rest pain of lower extremity M79.606, I99.8    Prolonged Q-T interval on ECG R94.31    Status post above-knee amputation of left lower extremity ([facility] D50.969    Aftercare for amputation stump Z47.81    Moderate to severe pulmonary hypertension ([facility]) I27.20    Adverse effect of amiodarone T46.2X5A    Skin ulcer of malleolar area of right ankle ([facility]) L97.319    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[facility]) I70.201       Current Outpatient Medications   Medication Sig Dispense Refill    metoprolol tartrate (LOPRESSOR) 25 mg tablet TAKE 1 TABLET EVERY 12     HOURS 180 Tab 1    cephALEXin (KEFLEX) 500 mg capsule Take 1 Cap by mouth two (2) times a day for 10 days. 20 Cap 0    apixaban (ELIQUIS) 5 mg tablet Take 1 Tab by mouth two (2) times a day. 60 Tab 5    dilTIAZem ER (CARDIZEM CD) 120 mg capsule Take 1 Cap by mouth nightly. 30 Cap 1    dilTIAZem ER (CARDIZEM CD) 180 mg capsule Take 1 Cap by mouth daily. 30 Cap 1    furosemide (LASIX) 20 mg tablet TAKE 1 TABLET DAILY 90 Tab 1    atorvastatin (LIPITOR) 80 mg tablet TAKE 1 TABLET ONCE DAILY 90 Tab 3    losartan (COZAAR) 100 mg tablet TAKE 1 TABLET ONCE DAILY 90 Tab 3    aspirin 81 mg chewable tablet Take 1 Tab by mouth daily. 90 Tab 3    gabapentin (NEURONTIN) 400 mg capsule take 1 capsule by mouth three times a day 270 Cap 0    multivitamin (ONE A DAY) tablet Take 1 Tab by mouth daily. Allergies   Allergen Reactions    Morphine Other (comments)     Patient gets confused with morphine.        Past Medical History:   Diagnosis Date    Acute blood loss as cause of postoperative anemia 4/4/2017    Anticoagulated on Coumadin     Aortoiliac occlusive disease ([facility] 1/25/2017    Atherosclerosis of native artery of both lower extremities with intermittent claudication ([address] 1/25/2017    Benign hypertensive heart disease with systolic congestive heart failure, NYHA class 2 ([address] 1/26/2015    Carotid artery disease (HCC)     Chronic anemia     Chronic systolic heart failure (HCC)     Chronic ulcer of right foot ([address] 4/4/2017    Chronic ulcer of right heel ([address] 8/8/2017    Coronary artery disease involving native coronary artery of native heart 3/15/2017    Successful stenting of Cx (Xience LESLEY) and RCA (Xience LESLEY) to 0% by Dr. Samara Blankenship on 3/15/17.     DDD (degenerative disc disease), lumbar 1/26/2015    Dyslipidemia     Erectile dysfunction 7/5/2016    Euthyroid sick syndrome 4/6/2017    Hereditary peripheral neuropathy 11/15/2016    History of cardioversion 4/18/2017    S/P Synchronized external cardioversion (4/18/2017 - Dr. Aydin Naranjo)    History of vitamin D deficiency 4/22/2017    Vitamin D 25-Hydroxy (4/22/2017) = 12.0; Vitamin D 25-Hydroxy (5/26/2017) = 38.7    Infection of vascular bypass graft ([address] 7/24/2017    Left lower extremity    Ischemic cardiomyopathy     Moderate to severe pulmonary hypertension ([address] 7/23/2017    Paroxysmal atrial fibrillation (HCC)     Peripheral artery disease ([address] 1/25/2017    Skin ulcer of malleolar area of right ankle ([facility]     Spinal stenosis of lumbar region with radiculopathy 5/4/2015    Dr. Ravi Needs       Social History     Socioeconomic History    Marital status: LEGALLY      Spouse name: Not on file    Number of children: Not on file    Years of education: Not on file    Highest education level: Not on file   Occupational History    Not on file   Social Needs    Financial resource strain: Not on file    Food insecurity     Worry: Not on file     Inability: Not on file    Transportation needs     Medical: Not on file     Non-medical: Not on file   Tobacco Use    Smoking status: Former Smoker    Smokeless tobacco: Never Used    Tobacco comment: quit in 2011   Substance and Sexual Activity    Alcohol use: Yes     Alcohol/week: 2.0 standard drinks     Types: 2 Cans of beer per week     Comment: 10 cans of beer a month    Drug use: Yes     Types: Marijuana     Comment: occasionally-last used 4/17    Sexual activity: Yes     Partners: Female   Lifestyle    Physical activity     Days per week: Not on file     Minutes per session: Not on file    Stress: Not on file   Relationships    Social connections     Talks on phone: Not on file     Gets together: Not on file     Attends Voodoo service: Not on file     Active member of club or organization: Not on file     Attends meetings of clubs or organizations: Not on file     Relationship status: Not on file    Intimate partner violence     Fear of current or ex partner: Not on file     Emotionally abused: Not on file     Physically abused: Not on file     Forced sexual activity: Not on file   Other Topics Concern    Not on file   Social History Narrative    Not on file       Family History   Problem Relation Age of Onset    Heart Disease Father          OBJECTIVE    Physical Exam:     Visit Vitals  /76 (BP 1 Location: Left arm, BP Patient Position: Sitting, BP Cuff Size: Adult)   Pulse 85   Temp 97.1 °F (36.2 °C) (Temporal)   Resp 16   Ht 5' 10\" (1.778 m)   Wt 194 lb (88 kg)   SpO2 97%   BMI 27.84 kg/m²       General: alert, chronically ill-appearing,on wheelchair, in no apparent distress or pain  Neck: supple, no adenopathy palpated  CVS: normal rate, IRIR, distinct S1 and S2  Lungs:clear to ausculation bilaterally, no crackles, wheezing or rhonchi noted  Abdomen: normoactive bowel sounds, soft, non-tender  Extremities: no edema, no cyanosis, MSK grossly normal  Skin: + skin lesion on R gluteal area with some purulent discharge  Psych:  mood and affect normal        ASSESSMENT/PLAN  Diagnoses and all orders for this visit:    1.  Peripheral artery disease ([facility]  S/p thrombectomy 4/2021  S/p AKA 2017  On asa,statin  Keep appt with dr. Jone Ferrer    2. Paroxysmal atrial fibrillation (HCC)  Rate controlled, anticoagulated  On eliquis, ccb, BB       CBC WITH AUTOMATED DIFF; Future  -     METABOLIC PANEL, COMPREHENSIVE; Future  Keep appt with cards 4/27    3. Dyslipidemia  LDL goal <70  Cont statin    4. Acute blood loss as cause of postoperative anemia  Repeat CBC soon, Monitoring    5. Carbuncle  Wound care, keflex given, avoid pressure    6. Atherosclerosis of native artery of both lower extremities with intermittent claudication (HCC)  On asa, BB    Other orders  -     cephALEXin (KEFLEX) 500 mg capsule; Take 1 Cap by mouth two (2) times a day for 10 days. Follow-up and Dispositions    · Return if symptoms worsen or fail to improve, for keep appt in september, labs in the next 1-2 weeks. Patient understands plan of care. Patient has provided input and agrees with goals. No

## 2021-04-21 ENCOUNTER — TELEPHONE (OUTPATIENT)
Dept: FAMILY MEDICINE CLINIC | Age: 61
End: 2021-04-21

## 2021-04-21 NOTE — TELEPHONE ENCOUNTER
Patient called with medication list:\(patient read each bottle with instruction)  ASA 81 mg daily  Losartan 100 mg daily  Furosemide 20 mg daily  Metoprolol tartrate 50 mg 1 tab every 12 hours (increased dose)  Gabapentin 300 mg 2 tabs in the am and 2 tabs at lunch  Gabapentin 100 mg 1 tab at bedtime  Atorvastatin 80 mg at bedtime  Multi vitamin daily  Eliquis 5 mg 1 tab two times a day  Diltiazem  mg in the am  Diltiazem  mg at bedtime    Not able to correct Metoprolol in patients chart.  Can only be done by RN or physician

## 2021-04-22 ENCOUNTER — TELEPHONE (OUTPATIENT)
Dept: FAMILY MEDICINE CLINIC | Age: 61
End: 2021-04-22

## 2021-04-22 RX ORDER — METOPROLOL TARTRATE 50 MG/1
50 TABLET ORAL 2 TIMES DAILY
Qty: 180 TAB | Refills: 0
Start: 2021-04-22 | End: 2021-07-26 | Stop reason: SDUPTHER

## 2021-04-22 NOTE — TELEPHONE ENCOUNTER
Patient states that he has not changed anything at home. New medications are Eliquis and diltiazem.   (patient states he has not started Keflex)   Patient states he noticed about 3 days ago that he was itching in random places, head, face chest to start off with. No rash noted. Patient states it is getting worse more frequently. Arms and hands have started today. Please advise    No SOB, difficulty swallowing, no scratchy throat. ER precautions have been reviewed with patient.

## 2021-04-22 NOTE — TELEPHONE ENCOUNTER
Patient identified with 2 identifiers (name and ). Patient aware to continue to monitor if rash develops to let us know in meantime patient may take bendryl otc at night. Patient verbalizes understanding.

## 2021-04-22 NOTE — TELEPHONE ENCOUNTER
Noted, monitor symptoms for now. If with develops rash may consider drug reaction but with just itching I am unsure of the source of this. May take otc benadryl at night and see if this helps.

## 2021-04-23 ENCOUNTER — HOSPITAL ENCOUNTER (OUTPATIENT)
Dept: LAB | Age: 61
Discharge: HOME | End: 2021-04-23
Payer: COMMERCIAL

## 2021-04-23 DIAGNOSIS — I48.0 PAROXYSMAL ATRIAL FIBRILLATION (HCC): ICD-10-CM

## 2021-04-23 LAB
ALBUMIN SERPL-MCNC: 3.6 G/DL (ref 3.4–5)
ALBUMIN/GLOB SERPL: 0.9 {RATIO} (ref 0.8–1.7)
ALP SERPL-CCNC: 206 U/L (ref 45–117)
ALT SERPL-CCNC: 31 U/L (ref 16–61)
ANION GAP SERPL CALC-SCNC: 5 MMOL/L (ref 3–18)
AST SERPL-CCNC: 31 U/L (ref 10–38)
BASOPHILS # BLD: 0.1 K/UL (ref 0–0.1)
BASOPHILS NFR BLD: 1 % (ref 0–2)
BILIRUB SERPL-MCNC: 0.3 MG/DL (ref 0.2–1)
BUN SERPL-MCNC: 14 MG/DL (ref 7–18)
BUN/CREAT SERPL: 15 (ref 12–20)
CALCIUM SERPL-MCNC: 9.3 MG/DL (ref 8.5–10.1)
CHLORIDE SERPL-SCNC: 110 MMOL/L (ref 100–111)
CO2 SERPL-SCNC: 27 MMOL/L (ref 21–32)
CREAT SERPL-MCNC: 0.91 MG/DL (ref 0.6–1.3)
DIFFERENTIAL METHOD BLD: ABNORMAL
EOSINOPHIL # BLD: 0.1 K/UL (ref 0–0.4)
EOSINOPHIL NFR BLD: 1 % (ref 0–5)
ERYTHROCYTE [DISTWIDTH] IN BLOOD BY AUTOMATED COUNT: 14.8 % (ref 11.6–14.5)
GLOBULIN SER CALC-MCNC: 3.9 G/DL (ref 2–4)
GLUCOSE SERPL-MCNC: 94 MG/DL (ref 74–99)
HCT VFR BLD AUTO: 37.5 % (ref 36–48)
HGB BLD-MCNC: 12 G/DL (ref 13–16)
LYMPHOCYTES # BLD: 2.6 K/UL (ref 0.9–3.6)
LYMPHOCYTES NFR BLD: 25 % (ref 21–52)
MCH RBC QN AUTO: 29.9 PG (ref 24–34)
MCHC RBC AUTO-ENTMCNC: 32 G/DL (ref 31–37)
MCV RBC AUTO: 93.3 FL (ref 74–97)
MONOCYTES # BLD: 0.9 K/UL (ref 0.05–1.2)
MONOCYTES NFR BLD: 9 % (ref 3–10)
NEUTS SEG # BLD: 6.7 K/UL (ref 1.8–8)
NEUTS SEG NFR BLD: 64 % (ref 40–73)
PLATELET # BLD AUTO: 657 K/UL (ref 135–420)
PMV BLD AUTO: 8.8 FL (ref 9.2–11.8)
POTASSIUM SERPL-SCNC: 3.9 MMOL/L (ref 3.5–5.5)
PROT SERPL-MCNC: 7.5 G/DL (ref 6.4–8.2)
RBC # BLD AUTO: 4.02 M/UL (ref 4.35–5.65)
SODIUM SERPL-SCNC: 142 MMOL/L (ref 136–145)
WBC # BLD AUTO: 10.4 K/UL (ref 4.6–13.2)

## 2021-04-23 PROCEDURE — 80053 COMPREHEN METABOLIC PANEL: CPT

## 2021-04-23 PROCEDURE — 85025 COMPLETE CBC W/AUTO DIFF WBC: CPT

## 2021-04-23 PROCEDURE — 36415 COLL VENOUS BLD VENIPUNCTURE: CPT

## 2021-04-26 RX ORDER — CEPHALEXIN 500 MG/1
CAPSULE ORAL
Qty: 20 CAP | Refills: 0 | Status: SHIPPED | OUTPATIENT
Start: 2021-04-26 | End: 2021-05-25 | Stop reason: ALTCHOICE

## 2021-04-27 ENCOUNTER — OFFICE VISIT (OUTPATIENT)
Dept: CARDIOLOGY CLINIC | Age: 61
End: 2021-04-27
Payer: COMMERCIAL

## 2021-04-27 VITALS
HEART RATE: 106 BPM | SYSTOLIC BLOOD PRESSURE: 142 MMHG | DIASTOLIC BLOOD PRESSURE: 90 MMHG | HEIGHT: 70 IN | WEIGHT: 199 LBS | OXYGEN SATURATION: 100 % | BODY MASS INDEX: 28.49 KG/M2

## 2021-04-27 DIAGNOSIS — Z09 HOSPITAL DISCHARGE FOLLOW-UP: ICD-10-CM

## 2021-04-27 DIAGNOSIS — I70.213 ATHEROSCLEROSIS OF NATIVE ARTERY OF BOTH LOWER EXTREMITIES WITH INTERMITTENT CLAUDICATION (HCC): Primary | ICD-10-CM

## 2021-04-27 PROCEDURE — 99214 OFFICE O/P EST MOD 30 MIN: CPT | Performed by: INTERNAL MEDICINE

## 2021-04-27 PROCEDURE — 93000 ELECTROCARDIOGRAM COMPLETE: CPT | Performed by: INTERNAL MEDICINE

## 2021-04-27 NOTE — PROGRESS NOTES
HISTORY OF PRESENT ILLNESS  Jan Banuelos is a 59 y.o. male. ASSESSMENT and PLAN    Mr. Opal Sorensen has known history of PVD, as well as CAD. His coronary angiography back in 2017 showed EF 30% with mild left main and LAD disease. Circumflex had proximal 70% lesion as well as trifurcation healed ruptured plaque with residual 90% stenosis. RCA had diffuse 50% with focal mid 90% disease. Subsequently, he had his circumflex and RCA stented to 0%. His nuclear scan back in November 2020 showed low risk finding with EF 51%. 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 He has PAF, as well as dyslipidemia. He was hospitalized March 2021 at [facility] for right leg arterial thrombectomy. He was noted to be in atrial fibrillation. From cardiac standpoint, he appears to be doing reasonably well. He has not had any chest pains. He denies any worsening claudication. His blood pressure is upper normal but acceptable. His heart rate remains upper normal mildly elevated in atrial fibrillation. Continue rate control is recommended. He is on Eliquis. There is no evidence of decompensated CHF noted. His target LDL is less than 70. He remains on Lipitor 80 mg daily. He denies active tobacco use. He remains on Eliquis. Since being discharged from the hospital, he has been having some episodes of itching. If he has persistent itching, his new medications, including Eliquis and Cardizem, may need to be replaced. He will come back in 2 weeks to see my nurse practitioner. I will see him back in 6 months. If he requires further rate control, his beta-blocker can be increased. Thank you. Encounter Diagnoses   Name Primary?     Atherosclerosis of native artery of both lower extremities with intermittent claudication ([facility] [address] Yes     current treatment plan is effective, no change in therapy  lab results and schedule of future lab studies reviewed with patient  reviewed diet, exercise and weight control  cardiovascular risk and specific lipid/LDL goals reviewed  use of aspirin to prevent MI and TIA's discussed      HPI   Today, Mr. Maxine Bustamante has no complaints of chest pains, increased shortness of breath or decreased exercise capacity. He denies any orthopnea or PND. He denies any palpitations or dizziness. He does have continued right lower extremity achiness. He has been struggling with his claudication. He denies active tobacco use. Review of Systems   Respiratory: Negative for shortness of breath. Cardiovascular: Positive for claudication. Negative for chest pain, palpitations, orthopnea, leg swelling and PND. Skin: Positive for itching. All other systems reviewed and are negative. Physical Exam  Vitals signs and nursing note reviewed. Constitutional:       Appearance: Normal appearance. HENT:      Head: Normocephalic. Eyes:      Conjunctiva/sclera: Conjunctivae normal.   Neck:      Musculoskeletal: No neck rigidity. Cardiovascular:      Rate and Rhythm: Tachycardia present. Rhythm irregular. Pulmonary:      Breath sounds: Normal breath sounds. Abdominal:      Palpations: Abdomen is soft. Musculoskeletal:         General: Swelling (Mild swelling in right lower extremity; left lower extremity has metal prosthesis) present. Skin:     General: Skin is warm and dry. Neurological:      General: No focal deficit present. Mental Status: He is alert and oriented to person, place, and time.    Psychiatric:         Mood and Affect: Mood normal.         Behavior: Behavior normal.         PCP: Reno Gerard MD    Past Medical History:   Diagnosis Date    Acute blood loss as cause of postoperative anemia 4/4/2017    Anticoagulated on Coumadin     Aortoiliac occlusive disease ([facility] [address] 1/25/2017    Atherosclerosis of native artery of both lower extremities with intermittent claudication ([facility] [address] 1/25/2017    Benign hypertensive heart disease with systolic congestive heart failure, NYHA class 2 ([facility] [address] 1/26/2015    Carotid artery disease (HCC)     Chronic anemia     Chronic systolic heart failure (HCC)     Chronic ulcer of right foot ([address] 4/4/2017    Chronic ulcer of right heel ([address] 8/8/2017    Coronary artery disease involving native coronary artery of native heart 3/15/2017    Successful stenting of Cx (Xience LESLEY) and RCA (Xience LESLEY) to 0% by Dr. Jonas Antony on 3/15/17.     DDD (degenerative disc disease), lumbar 1/26/2015    Dyslipidemia     Erectile dysfunction 7/5/2016    Euthyroid sick syndrome 4/6/2017    Hereditary peripheral neuropathy 11/15/2016    History of cardioversion 4/18/2017    S/P Synchronized external cardioversion (4/18/2017 - Dr. Leno Buckner)    History of vitamin D deficiency 4/22/2017    Vitamin D 25-Hydroxy (4/22/2017) = 12.0; Vitamin D 25-Hydroxy (5/26/2017) = 38.7    Infection of vascular bypass graft ([address] 7/24/2017    Left lower extremity    Ischemic cardiomyopathy     Moderate to severe pulmonary hypertension ([address] 7/23/2017    Paroxysmal atrial fibrillation (HCC)     Peripheral artery disease ([address] 1/25/2017    Skin ulcer of malleolar area of right ankle ([address]     Spinal stenosis of lumbar region with radiculopathy 5/4/2015    Dr. Tesha Turner       Past Surgical History:   Procedure Laterality Date    CARDIAC CATHETERIZATION  3/8/2017         CARDIAC CATHETERIZATION  3/15/2017         CORONARY STENT SINGLE W/PTCA  3/15/2017         HX ABOVE KNEE AMPUTATION Left 08/18/2017    S/P Left above-the-knee amputation (8/18/2017 - Dr. Jaswant Gilman)    HX ATHERECTOMY Left 06/15/2017    S/P Atherectomy and balloon angioplasty of the left superficial femoral artery and above-knee popliteal artery (6/15/2017 - Dr. Jaswant Gilman)    HX ATHERECTOMY Right 09/07/2017    S/P Atherectomy and balloon angioplasty of the below-the-knee popliteal artery, above-the-knee popliteal artery, tibioperoneal trunk artery and posterior tibial artery (9/7/2017 - Dr. Lizzie Lopez)    HX COLONOSCOPY  07/23/2015    polyps repeat 2020 5 years [address] Right 04/04/2017    S/P Right axillary to bifemoral artery bypass using an 8 mm Propaten graft from the right axilla to the right common femoral artery with a jump femoral-femoral bypass with another 8 mm Propaten external ring bypass; Right common femoral artery, and profunda femoris artery and superficial femoral artery endarterectomy causing an extensive surgery on the right side (4/4/2017 - Dr. Brigida Donaldson)    [address] Right 04/07/2017    S/P Cutdown of femoral-femoral bypass, more on the left groin side; Right lower extremity angiography with first-order catheterization (4/7/2017 - Dr. Lizzie Lopez)    HX FEMORAL BYPASS Right 04/10/2017    S/P Right femoral to above-knee popliteal bypass with an 8 mm PTFE graft (4/10/2017 - Dr. Hema Salgado)    HX OTHER SURGICAL Left 07/25/2017    S/P Removal of infected graft; Repair of left superficial femoral artery; Repair of left common femoral artery (7/25/2017 - Dr. Lizzie Lopez)    HX OTHER SURGICAL Right 06/27/2017    S/P Drainage of right side abscess (6/27/2017 - Dr. Lizzie Lopez)    HX OTHER SURGICAL Left 07/01/2017    S/P Left groin exploration; Left femoral repair pseudoaneurysm; Left wound washout; Wound VAC placement (7/01/2017 - Dr. Lizzie Lopez)    HX OTHER SURGICAL Left 07/04/2017    S/P Left common femoral artery ligation; Jump bypass from right to left fem-fem to a more distal superficial femoral artery using 8 mm external ringed Propaten graft; Left groin wound exploration and washout (7/4/2017 - Dr. Lizzie Lopez)    HX OTHER SURGICAL Right 08/18/2017    S/P Right axillary femoral jump bypass interposition; Removal of infected right axillary femoral bypass;  Wound VAC placement of upper thigh 1.2 cm x 5.2 cm x 1.8 cm and groin 4 cm x 0.5 cm x 0.2 cm (8/18/2017 - Dr. Daron Garcia)       Current Outpatient Medications   Medication Sig Dispense Refill    metoprolol tartrate (Lopressor) 50 mg tablet Take 1 Tab by mouth two (2) times a day. 180 Tab 0    apixaban (ELIQUIS) 5 mg tablet Take 1 Tab by mouth two (2) times a day. 60 Tab 5    dilTIAZem ER (CARDIZEM CD) 120 mg capsule Take 1 Cap by mouth nightly. 30 Cap 1    dilTIAZem ER (CARDIZEM CD) 180 mg capsule Take 1 Cap by mouth daily. 30 Cap 1    furosemide (LASIX) 20 mg tablet TAKE 1 TABLET DAILY 90 Tab 1    atorvastatin (LIPITOR) 80 mg tablet TAKE 1 TABLET ONCE DAILY 90 Tab 3    losartan (COZAAR) 100 mg tablet TAKE 1 TABLET ONCE DAILY 90 Tab 3    aspirin 81 mg chewable tablet Take 1 Tab by mouth daily. 90 Tab 3    gabapentin (NEURONTIN) 400 mg capsule take 1 capsule by mouth three times a day 270 Cap 0    multivitamin (ONE A DAY) tablet Take 1 Tab by mouth daily.  cephALEXin (KEFLEX) 500 mg capsule TAKE 1 CAPSULE TWICE DAILY FOR 10 DAYS 20 Cap 0       The patient has a family history of    Social History     Tobacco Use    Smoking status: Former Smoker    Smokeless tobacco: Never Used    Tobacco comment: quit in 2011   Substance Use Topics    Alcohol use:  Yes     Alcohol/week: 2.0 standard drinks     Types: 2 Cans of beer per week     Comment: 10 cans of beer a month    Drug use: Yes     Types: Marijuana     Comment: occasionally-last used 4/17       Lab Results   Component Value Date/Time    Cholesterol, total 105 09/08/2020 07:09 AM    HDL Cholesterol 26 (L) 09/08/2020 07:09 AM    LDL, calculated 60.4 09/08/2020 07:09 AM    Triglyceride 93 09/08/2020 07:09 AM    CHOL/HDL Ratio 4.0 09/08/2020 07:09 AM        BP Readings from Last 3 Encounters:   04/27/21 (!) 142/90   04/20/21 138/76   04/12/21 120/77        Pulse Readings from Last 3 Encounters:   04/27/21 (!) 106   04/20/21 85   04/12/21 (!) 130       Wt Readings from Last 3 Encounters:   04/27/21 90.3 kg (199 lb)   04/20/21 88 kg (194 lb)   04/12/21 88 kg (194 lb 0.1 oz)         EKG: unchanged from previous tracings, nonspecific ST and T waves changes, atrial fibrillation, rate 106, occasional PVC noted, unifocal.

## 2021-04-27 NOTE — PROGRESS NOTES
Travon Milton presents today for   Chief Complaint   Patient presents with   [facility] Follow Up     prev [facility] pt, PH 2 week f/u       Travon Milton preferred language for health care discussion is english/other. Is someone accompanying this pt? no    Is the patient using any DME equipment during [address]? no    Depression Screening:  3 most recent PHQ Screens 4/27/2021   PHQ Not Done -   Little interest or pleasure in doing things Not at all   Feeling down, depressed, irritable, or hopeless Not at all   Total Score PHQ 2 0       Learning Assessment:  Learning Assessment 10/1/2019   PRIMARY LEARNER Patient   HIGHEST LEVEL OF EDUCATION - PRIMARY LEARNER  -   BARRIERS PRIMARY LEARNER -   [address] [place]    NEED -   LEARNER PREFERENCE PRIMARY LISTENING   ANSWERED BY patient   RELATIONSHIP SELF       Abuse Screening:  Abuse Screening Questionnaire 4/27/2021   Do you ever feel afraid of your partner? N   Are you in a relationship with someone who physically or mentally threatens you? N   Is it safe for you to go home? Y       Fall Risk  Fall Risk Assessment, last 12 mths 3/16/2021   Able to walk? Yes   Fall in past 12 months? 0   Do you feel unsteady? -   Are you worried about falling -   Is TUG test greater than 12 seconds? -   Is the gait abnormal? -   Number of falls in past 12 months -   Fall with injury? -       Pt currently taking Anticoagulant therapy? Eliquis 5mg    Coordination of Care:  1. Have you been to the ER, urgent care clinic since your last visit? Hospitalized since your last visit? yes    2. Have you seen or consulted any other health care providers outside of the [address] [place] [address] since your last visit? Include any pap smears or colon screening.  no

## 2021-05-04 NOTE — PROGRESS NOTES
Keven Balderas presents today for a 2 week follow-up. He was last seen by Dr. Seven Collins on 4/27/21 and during that visit, he remained in atrial fibrillation with his heart rate in the upper range of normal.  He complained of itching and was asked to return today for re-evaluation. Per Dr. Seven Collins, if itching continued, consideration should be given to discontinuing his Eliquis and Cardizem and replacing them with appropriate medications (another anticoagulant and rate controlling agent). He is a 59year old male with history of dyslipidemia, dilated non-ischemic cardiomyopathy, systolic heart failure, PVD (s/p left AKA and bilateral carotid disease), paroxysmal atrial fibrillation (s/p cardioversion in April 2017), and CAD (s/p PCI/stent to RCA in March 2017 with Xience LESLEY). He also has history of PVD, followed by vascular surgery (s/p right leg arterial thrombectomy in March 2021). He was noted to be in atrial fibrillation during his hospitalization in March 2021. His last echo was done in April 2021 and it showed an EF of 55-60%. His last cardiac catheterization was performed in March 2017. Overall, he states that he has been feeling well and offers no cardiac complaints. He continues to experience intermittent itching but no rashes. Denies chest pain, tightness, heaviness, and palpitations. Denies shortness of breath at rest, dyspnea on exertion, orthopnea and PND. Denies abdominal bloating. Denies lightheadedness, dizziness, and syncope. Admits to mild right lower extremity edema (improving) and denies claudication. He has a left AKA and is wearing his prosthesis today. Denies nausea, vomiting, diarrhea, melena, hematochezia. Denies hematuria, urgency, frequency. Denies fever, chills.         PMH:  Past Medical History:   Diagnosis Date    Acute blood loss as cause of postoperative anemia 4/4/2017    Anticoagulated on Coumadin     Aortoiliac occlusive disease ([facility] [address] 1/25/2017    Atherosclerosis of native artery of both lower extremities with intermittent claudication ([address] 1/25/2017    Benign hypertensive heart disease with systolic congestive heart failure, NYHA class 2 ([address] 1/26/2015    Carotid artery disease (HCC)     Chronic anemia     Chronic systolic heart failure (HCC)     Chronic ulcer of right foot ([address] 4/4/2017    Chronic ulcer of right heel ([address] 8/8/2017    Coronary artery disease involving native coronary artery of native heart 3/15/2017    Successful stenting of Cx (Xience LESLEY) and RCA (Xience LESLEY) to 0% by Dr. Sagar Ray on 3/15/17.     DDD (degenerative disc disease), lumbar 1/26/2015    Dyslipidemia     Erectile dysfunction 7/5/2016    Euthyroid sick syndrome 4/6/2017    Hereditary peripheral neuropathy 11/15/2016    History of cardioversion 4/18/2017    S/P Synchronized external cardioversion (4/18/2017 - Dr. Kymberly Lang)    History of vitamin D deficiency 4/22/2017    Vitamin D 25-Hydroxy (4/22/2017) = 12.0; Vitamin D 25-Hydroxy (5/26/2017) = 38.7    Infection of vascular bypass graft ([address] 7/24/2017    Left lower extremity    Ischemic cardiomyopathy     Moderate to severe pulmonary hypertension ([address] 7/23/2017    Paroxysmal atrial fibrillation (HCC)     Peripheral artery disease ([address] 1/25/2017    Skin ulcer of malleolar area of right ankle ([address]     Spinal stenosis of lumbar region with radiculopathy 5/4/2015    Dr. Joe Moore       PSH:  Past Surgical History:   Procedure Laterality Date    CARDIAC CATHETERIZATION  3/8/2017         CARDIAC CATHETERIZATION  3/15/2017         CORONARY STENT SINGLE W/PTCA  3/15/2017         HX ABOVE KNEE AMPUTATION Left 08/18/2017    S/P Left above-the-knee amputation (8/18/2017 - Dr. Cynthia Sol)    HX ATHERECTOMY Left 06/15/2017    S/P Atherectomy and balloon angioplasty of the left superficial femoral artery and above-knee popliteal artery (6/15/2017 - Dr. Cynthia Sol)    HX ATHERECTOMY Right 09/07/2017    S/P Atherectomy and balloon angioplasty of the below-the-knee popliteal artery, above-the-knee popliteal artery, tibioperoneal trunk artery and posterior tibial artery (9/7/2017 - Dr. Maryanne Carbajal)    HX COLONOSCOPY  07/23/2015    polyps repeat 2020 5 years [address] Right 04/04/2017    S/P Right axillary to bifemoral artery bypass using an 8 mm Propaten graft from the right axilla to the right common femoral artery with a jump femoral-femoral bypass with another 8 mm Propaten external ring bypass; Right common femoral artery, and profunda femoris artery and superficial femoral artery endarterectomy causing an extensive surgery on the right side (4/4/2017 - Dr. Rhoda Fuller)    [address] Right 04/07/2017    S/P Cutdown of femoral-femoral bypass, more on the left groin side; Right lower extremity angiography with first-order catheterization (4/7/2017 - Dr. Maryanne Carbajal)    HX FEMORAL BYPASS Right 04/10/2017    S/P Right femoral to above-knee popliteal bypass with an 8 mm PTFE graft (4/10/2017 - Dr. Maribel Noriega)    HX OTHER SURGICAL Left 07/25/2017    S/P Removal of infected graft; Repair of left superficial femoral artery; Repair of left common femoral artery (7/25/2017 - Dr. Maryanne Carbajal)    HX OTHER SURGICAL Right 06/27/2017    S/P Drainage of right side abscess (6/27/2017 - Dr. Maryanne Carbajal)    HX OTHER SURGICAL Left 07/01/2017    S/P Left groin exploration; Left femoral repair pseudoaneurysm; Left wound washout; Wound VAC placement (7/01/2017 - Dr. Maryanne Carbajal)    HX OTHER SURGICAL Left 07/04/2017    S/P Left common femoral artery ligation; Jump bypass from right to left fem-fem to a more distal superficial femoral artery using 8 mm external ringed Propaten graft;  Left groin wound exploration and washout (7/4/2017 - Dr. Maryanne Carbajal)    HX OTHER SURGICAL Right 08/18/2017    S/P Right axillary femoral jump bypass interposition; Removal of infected right axillary femoral bypass; Wound VAC placement of upper thigh 1.2 cm x 5.2 cm x 1.8 cm and groin 4 cm x 0.5 cm x 0.2 cm (8/18/2017 - Dr. Feng Roberts)       MEDS:  Current Outpatient Medications   Medication Sig    cephALEXin (KEFLEX) 500 mg capsule TAKE 1 CAPSULE TWICE DAILY FOR 10 DAYS    metoprolol tartrate (Lopressor) 50 mg tablet Take 1 Tab by mouth two (2) times a day.  apixaban (ELIQUIS) 5 mg tablet Take 1 Tab by mouth two (2) times a day.  dilTIAZem ER (CARDIZEM CD) 120 mg capsule Take 1 Cap by mouth nightly.  dilTIAZem ER (CARDIZEM CD) 180 mg capsule Take 1 Cap by mouth daily.  furosemide (LASIX) 20 mg tablet TAKE 1 TABLET DAILY    atorvastatin (LIPITOR) 80 mg tablet TAKE 1 TABLET ONCE DAILY    losartan (COZAAR) 100 mg tablet TAKE 1 TABLET ONCE DAILY    aspirin 81 mg chewable tablet Take 1 Tab by mouth daily.  gabapentin (NEURONTIN) 400 mg capsule take 1 capsule by mouth three times a day    multivitamin (ONE A DAY) tablet Take 1 Tab by mouth daily. No current facility-administered medications for this visit. Allergies and Sensitivities:  Allergies   Allergen Reactions    Morphine Other (comments)     Patient gets confused with morphine. Family History:  Family History   Problem Relation Age of Onset    Heart Disease Father        Social History:  He  reports that he has quit smoking. He has never used smokeless tobacco.  He  reports current alcohol use of about 2.0 standard drinks of alcohol per week. Physical:  Visit Vitals  /70 (BP 1 Location: Right upper arm, BP Patient Position: Sitting, BP Cuff Size: Adult)   Pulse 70   Ht 5' 10\" (1.778 m)   Wt 93 kg (205 lb)   SpO2 98%   BMI 29.41 kg/m²       His weight is up 6 pounds. He is wearing his leg prosthesis today. States that he was not wearing it during his last appointment.       Exam:  Neck:  Supple, no JVD, no carotid bruits  CV:  Normal S1 and S2, no murmurs, rubs, or gallops noted  Lungs:  Clear to ausculation throughout, no wheezes or rales  Abd:  Soft, non-tender, non-distended with good bowel sounds. No hepatosplenomegaly  Extremities:  No edema in right leg. Left AKA with prosthesis      Data:  EKG:  Not done today      LABS:  Lab Results   Component Value Date/Time    Sodium 142 04/23/2021 02:23 PM    Potassium 3.9 04/23/2021 02:23 PM    Chloride 110 04/23/2021 02:23 PM    CO2 27 04/23/2021 02:23 PM    Glucose 94 04/23/2021 02:23 PM    BUN 14 04/23/2021 02:23 PM    Creatinine 0.91 04/23/2021 02:23 PM     Lab Results   Component Value Date/Time    Cholesterol, total 105 09/08/2020 07:09 AM    HDL Cholesterol 26 (L) 09/08/2020 07:09 AM    LDL, calculated 60.4 09/08/2020 07:09 AM    Triglyceride 93 09/08/2020 07:09 AM    CHOL/HDL Ratio 4.0 09/08/2020 07:09 AM     Lab Results   Component Value Date/Time    ALT (SGPT) 31 04/23/2021 02:23 PM         Impression/Plan:  1.  CAD, s/p PCI/stent to RCA in March 2017 with Xience LESLEY  2. Essential hypertension, blood pressure stable and well controlled  3. Hyperlipidemia, on atorvastatin 80mg  4. Chronic diastolic heart failure, appears compensated  5. Dilated, non-ischemic cardiomyopathy  6. PVD, s/p left AKA and bilateral carotid disease, followed by vascular surgery  7. Urticaria    Mr. Angela Mcclelland was seen today for a 2 week follow-up. He returns today for evaluation of itching. It is unclear at present which of his 2 new medications is causing the itching. The newest medications are Eliquis and Diltiazem. For now, will discontinue the Eliquis first and start him on Xarelto 20mg once a day (given a voucher for 30 days free). If the itching resolves after the Eliquis is discontinued and he does well with the Xarelto, will send a 90 day script to [address] [place] [address]. If itching continues after the anticoagulation is switched, will then discontinue diltiazem and try a different rate controlling medication. He states that he has tried Benadryl and it has not been too effective. He was asked to try one of the other allergy medications available OTC such as Zyrtec, Allegra, or Claritin. He will follow-up with me in 2 weeks for re-evaluation of the itching. He offers no cardiac complaints today. He states that the edema in his right lower extremity is improving. His weight is up 6 pounds but he is wearing his left leg prosthesis today and he states he was not wearing it when he saw Dr. Iron Garcia on 4/27/21. Congestive heart failure teaching reinforced today. Advised to limit sodium intake to no more than 2000mg per day and to also limit fluid intake to 48 ounces per day (unless otherwise specified). Advised to weigh daily every morning and record weights. Instructed to call our office if progressive weight gain is noted over a 2 to 3 day period of time, shortness of breath increases, or if abdominal bloating, nausea, fatigue, or increased lower extremity edema is noted. He will follow-up with Dr. Iron Garcia as scheduled and as needed. Roland Weaver MSN, FNP-BC    Please note:  Portions of this chart were created with Dragon medical speech to text program.  Unrecognized errors may be present.

## 2021-05-11 ENCOUNTER — OFFICE VISIT (OUTPATIENT)
Dept: CARDIOLOGY CLINIC | Age: 61
End: 2021-05-11
Payer: COMMERCIAL

## 2021-05-11 VITALS
SYSTOLIC BLOOD PRESSURE: 126 MMHG | OXYGEN SATURATION: 98 % | WEIGHT: 205 LBS | HEIGHT: 70 IN | DIASTOLIC BLOOD PRESSURE: 70 MMHG | HEART RATE: 70 BPM | BODY MASS INDEX: 29.35 KG/M2

## 2021-05-11 DIAGNOSIS — I48.0 PAROXYSMAL ATRIAL FIBRILLATION (HCC): ICD-10-CM

## 2021-05-11 DIAGNOSIS — E78.5 DYSLIPIDEMIA: ICD-10-CM

## 2021-05-11 DIAGNOSIS — I25.5 ISCHEMIC CARDIOMYOPATHY: Primary | ICD-10-CM

## 2021-05-11 DIAGNOSIS — I25.10 CORONARY ARTERY DISEASE INVOLVING NATIVE CORONARY ARTERY OF NATIVE HEART WITHOUT ANGINA PECTORIS: ICD-10-CM

## 2021-05-11 DIAGNOSIS — I50.22 CHRONIC SYSTOLIC HEART FAILURE (HCC): ICD-10-CM

## 2021-05-11 PROCEDURE — 99213 OFFICE O/P EST LOW 20 MIN: CPT | Performed by: NURSE PRACTITIONER

## 2021-05-11 NOTE — PROGRESS NOTES
Dax Ryan presents today for   Chief Complaint   Patient presents with    Follow-up     2 week follow up        Dax Ryan preferred language for health care discussion is english/other. Is someone accompanying this pt? no    Is the patient using any DME equipment during [address]? Yes. Depression Screening:  3 most recent PHQ Screens 5/11/2021   PHQ Not Done -   Little interest or pleasure in doing things Not at all   Feeling down, depressed, irritable, or hopeless Not at all   Total Score PHQ 2 0       Learning Assessment:  Learning Assessment 10/1/2019   PRIMARY LEARNER Patient   HIGHEST LEVEL OF EDUCATION - PRIMARY LEARNER  -   BARRIERS PRIMARY LEARNER -   [address]  CAREGIVER -   PRIMARY LANGUAGE ENGLISH    NEED -   LEARNER PREFERENCE PRIMARY LISTENING   ANSWERED BY patient   RELATIONSHIP SELF       Abuse Screening:  Abuse Screening Questionnaire 5/11/2021   Do you ever feel afraid of your partner? N   Are you in a relationship with someone who physically or mentally threatens you? N   Is it safe for you to go home? Y       Fall Risk  Fall Risk Assessment, last 12 mths 3/16/2021   Able to walk? Yes   Fall in past 12 months? 0   Do you feel unsteady? -   Are you worried about falling -   Is TUG test greater than 12 seconds? -   Is the gait abnormal? -   Number of falls in past 12 months -   Fall with injury? -       Pt currently taking Anticoagulant therapy? Eliquis 5mg 2 times a day     Coordination of Care:  1. Have you been to the ER, urgent care clinic since your last visit? Hospitalized since your last visit? no    2. Have you seen or consulted any other health care providers outside of the [address] [place] [address] since your last visit? Include any pap smears or colon screening.  no

## 2021-05-11 NOTE — PATIENT INSTRUCTIONS
Discontinue Eliquis  Begin Xarelto 20mg once a day  Continue all other medications for now  Monitor itching situation.   If itching decreases or stops after stopping Eliquis, let us know  Follow-up with Amarilys delarosa in 2 weeks  Follow-up with Dr. Jacquie Randall as scheduled and as needed

## 2021-05-17 NOTE — PROGRESS NOTES
Tameka Brothers presents today for a 2 week follow-up of itching. When I saw him on 5/11/21, he continued to complain of itching. It was unclear which of his newest medications, Eliquis or Cardizem was causing the itching. His Eliquis was discontinued and he was switched to Xarelto. He was to let us know if the itching continued after being switched to Xarelto. He was last seen by Dr. Keenan López on 4/27/21 and during that visit, he remained in atrial fibrillation with his heart rate in the upper range of normal.  He complained of itching and was asked to return today for re-evaluation. Per Dr. Keenan López, if itching continued, consideration should be given to discontinuing his Eliquis and Cardizem and replacing them with appropriate medications (another anticoagulant and rate controlling agent). He is a 59year old male with history of dyslipidemia, dilated non-ischemic cardiomyopathy, systolic heart failure, PVD (s/p left AKA and bilateral carotid disease), paroxysmal atrial fibrillation (s/p cardioversion in April 2017), and CAD (s/p PCI/stent to RCA in March 2017 with Xience LESLEY). He also has history of PVD, followed by vascular surgery (s/p right leg arterial thrombectomy in March 2021). He was noted to be in atrial fibrillation during his hospitalization in March 2021. His last echo was done in April 2021 and it showed an EF of 55-60%. His last cardiac catheterization was performed in March 2017. Overall, he states that he has been feeling well and offers no cardiac complaints. He states that the itching has markedly improved and is only intermittent now. He denies a rash. Denies chest pain, tightness, heaviness, and palpitations. Denies shortness of breath at rest, dyspnea on exertion, orthopnea and PND. Denies abdominal bloating. Denies lightheadedness, dizziness, and syncope. Admits to mild right lower extremity edema (improving) and denies claudication.   He has a left AKA and is wearing his prosthesis today. Denies nausea, vomiting, diarrhea, melena, hematochezia. Denies hematuria, urgency, frequency. Denies fever, chills. PMH:  Past Medical History:   Diagnosis Date    Acute blood loss as cause of postoperative anemia 4/4/2017    Anticoagulated on Coumadin     Aortoiliac occlusive disease (HCC) 1/25/2017    Atherosclerosis of native artery of both lower extremities with intermittent claudication ([address] 1/25/2017    Benign hypertensive heart disease with systolic congestive heart failure, NYHA class 2 ([address] 1/26/2015    Carotid artery disease (HCC)     Chronic anemia     Chronic systolic heart failure (HCC)     Chronic ulcer of right foot ([address] 4/4/2017    Chronic ulcer of right heel ([address] 8/8/2017    Coronary artery disease involving native coronary artery of native heart 3/15/2017    Successful stenting of Cx (Xience LESLEY) and RCA (Xience LESLEY) to 0% by Dr. Heron Prescott on 3/15/17.     DDD (degenerative disc disease), lumbar 1/26/2015    Dyslipidemia     Erectile dysfunction 7/5/2016    Euthyroid sick syndrome 4/6/2017    Hereditary peripheral neuropathy 11/15/2016    History of cardioversion 4/18/2017    S/P Synchronized external cardioversion (4/18/2017 - Dr. Mary Ann Tavarez)    History of vitamin D deficiency 4/22/2017    Vitamin D 25-Hydroxy (4/22/2017) = 12.0; Vitamin D 25-Hydroxy (5/26/2017) = 38.7    Infection of vascular bypass graft ([address] 7/24/2017    Left lower extremity    Ischemic cardiomyopathy     Moderate to severe pulmonary hypertension ([address] 7/23/2017    Paroxysmal atrial fibrillation (HCC)     Peripheral artery disease ([address] 1/25/2017    Skin ulcer of malleolar area of right ankle ([address]     Spinal stenosis of lumbar region with radiculopathy 5/4/2015    Dr. Rukhsana Main       PSH:  Past Surgical History:   Procedure Laterality Date    CARDIAC CATHETERIZATION  3/8/2017         CARDIAC CATHETERIZATION  3/15/2017         CORONARY STENT SINGLE W/PTCA  3/15/2017         HX ABOVE KNEE AMPUTATION Left 08/18/2017    S/P Left above-the-knee amputation (8/18/2017 - Dr. Anju Graham)    HX ATHERECTOMY Left 06/15/2017    S/P Atherectomy and balloon angioplasty of the left superficial femoral artery and above-knee popliteal artery (6/15/2017 - Dr. Anju Graham)    HX ATHERECTOMY Right 09/07/2017    S/P Atherectomy and balloon angioplasty of the below-the-knee popliteal artery, above-the-knee popliteal artery, tibioperoneal trunk artery and posterior tibial artery (9/7/2017 - Dr. Anju Graham)    HX COLONOSCOPY  07/23/2015    polyps repeat 2020 5 years [facility] [address] Right 04/04/2017    S/P Right axillary to bifemoral artery bypass using an 8 mm Propaten graft from the right axilla to the right common femoral artery with a jump femoral-femoral bypass with another 8 mm Propaten external ring bypass; Right common femoral artery, and profunda femoris artery and superficial femoral artery endarterectomy causing an extensive surgery on the right side (4/4/2017 - Dr. Mckay Watson)    [address] Right 04/07/2017    S/P Cutdown of femoral-femoral bypass, more on the left groin side; Right lower extremity angiography with first-order catheterization (4/7/2017 - Dr. Anju Graham)    HX FEMORAL BYPASS Right 04/10/2017    S/P Right femoral to above-knee popliteal bypass with an 8 mm PTFE graft (4/10/2017 - Dr. Benjamin Lott)    HX OTHER SURGICAL Left 07/25/2017    S/P Removal of infected graft; Repair of left superficial femoral artery; Repair of left common femoral artery (7/25/2017 - Dr. Anju Graham)    HX OTHER SURGICAL Right 06/27/2017    S/P Drainage of right side abscess (6/27/2017 - Dr. Anju Graham)    HX OTHER SURGICAL Left 07/01/2017    S/P Left groin exploration; Left femoral repair pseudoaneurysm; Left wound washout;  Wound VAC placement (7/01/2017 - Dr. Anju Graham)    HX OTHER SURGICAL Left 07/04/2017    S/P Left common femoral artery ligation; Jump bypass from right to left fem-fem to a more distal superficial femoral artery using 8 mm external ringed Propaten graft; Left groin wound exploration and washout (7/4/2017 - Dr. Carmella Varela)    HX OTHER SURGICAL Right 08/18/2017    S/P Right axillary femoral jump bypass interposition; Removal of infected right axillary femoral bypass; Wound VAC placement of upper thigh 1.2 cm x 5.2 cm x 1.8 cm and groin 4 cm x 0.5 cm x 0.2 cm (8/18/2017 - Dr. Carmella Varela)       MEDS:  Current Outpatient Medications   Medication Sig    rivaroxaban (Xarelto) 20 mg tab tablet Take 1 Tab by mouth daily.  cephALEXin (KEFLEX) 500 mg capsule TAKE 1 CAPSULE TWICE DAILY FOR 10 DAYS    metoprolol tartrate (Lopressor) 50 mg tablet Take 1 Tab by mouth two (2) times a day.  dilTIAZem ER (CARDIZEM CD) 120 mg capsule Take 1 Cap by mouth nightly.  dilTIAZem ER (CARDIZEM CD) 180 mg capsule Take 1 Cap by mouth daily.  furosemide (LASIX) 20 mg tablet TAKE 1 TABLET DAILY    atorvastatin (LIPITOR) 80 mg tablet TAKE 1 TABLET ONCE DAILY    losartan (COZAAR) 100 mg tablet TAKE 1 TABLET ONCE DAILY    aspirin 81 mg chewable tablet Take 1 Tab by mouth daily.  gabapentin (NEURONTIN) 400 mg capsule take 1 capsule by mouth three times a day    multivitamin (ONE A DAY) tablet Take 1 Tab by mouth daily. No current facility-administered medications for this visit. Allergies and Sensitivities:  Allergies   Allergen Reactions    Morphine Other (comments)     Patient gets confused with morphine. Family History:  Family History   Problem Relation Age of Onset    Heart Disease Father        Social History:  He  reports that he has quit smoking. He has never used smokeless tobacco.  He  reports current alcohol use of about 2.0 standard drinks of alcohol per week.       Physical:  Visit Vitals  /72 (BP 1 Location: Left upper arm, BP Patient Position: Sitting, BP Cuff Size: Adult)   Pulse 82   Ht 5' 10\" (1.778 m)   Wt 93.9 kg (207 lb)   SpO2 98%   BMI 29.70 kg/m²         His weight is down 1 pound. He is wearing his leg prosthesis today. Exam:  Neck:  Supple, no JVD, no carotid bruits  CV:  Normal S1 and  S2, no murmurs, rubs, or gallops noted  Lungs:  Clear to ausculation throughout, no wheezes or rales  Abd:  Soft, non-tender, non-distended with good bowel sounds. No hepatosplenomegaly  Extremities:  No edema in right leg. Left AKA with prosthesis      Data:  EKG:  Not done today      LABS:  Lab Results   Component Value Date/Time    Sodium 142 04/23/2021 02:23 PM    Potassium 3.9 04/23/2021 02:23 PM    Chloride 110 04/23/2021 02:23 PM    CO2 27 04/23/2021 02:23 PM    Glucose 94 04/23/2021 02:23 PM    BUN 14 04/23/2021 02:23 PM    Creatinine 0.91 04/23/2021 02:23 PM     Lab Results   Component Value Date/Time    Cholesterol, total 105 09/08/2020 07:09 AM    HDL Cholesterol 26 (L) 09/08/2020 07:09 AM    LDL, calculated 60.4 09/08/2020 07:09 AM    Triglyceride 93 09/08/2020 07:09 AM    CHOL/HDL Ratio 4.0 09/08/2020 07:09 AM     Lab Results   Component Value Date/Time    ALT (SGPT) 31 04/23/2021 02:23 PM         Impression/Plan:  1.  CAD, s/p PCI/stent to RCA in March 2017 with Xience LESLEY  2. Essential hypertension, blood pressure stable and well controlled  3. Hyperlipidemia, on atorvastatin 80mg  4. Chronic diastolic heart failure, appears compensated  5. Dilated, non-ischemic cardiomyopathy  6. PVD, s/p left AKA and bilateral carotid disease, followed by vascular surgery  7. Urticaria, markedly improved    Mr. Pierre Schaefer was seen today for a 2 week follow-up. He returns today for evaluation of itching. During his last visit, he was switched to Xarelto from Eliquis to see if the itching continued. He states that the itching has markedly improved and the itching only occurs intermittently now.   He states that he forgot to try one of the OTC antihistamines that I recommended during his last visit. He has tolerated the switch to Xarelto and has not had any problems with the medication. He denies any bleeding issues. His blood pressure and heart rate remain well controlled. He does not exhibit any signs of volume overload. He offers no cardiac complaints. No changes were made to his medications today. Congestive heart failure teaching reinforced today. Advised to limit sodium intake to no more than 2000mg per day and to also limit fluid intake to 48 ounces per day (unless otherwise specified). Advised to weigh daily every morning and record weights. Instructed to call our office if progressive weight gain is noted over a 2 to 3 day period of time, shortness of breath increases, or if abdominal bloating, nausea, fatigue, or increased lower extremity edema is noted. He will follow-up with Dr. Juan Manuel Huitron as scheduled and as needed. Malcom Delgado MSN, FNP-BC    Please note:  Portions of this chart were created with Dragon medical speech to text program.  Unrecognized errors may be present.

## 2021-05-19 NOTE — PROGRESS NOTES
A ([address]mk [address] Is-1) pacemaker generator was used and lead connections to the device generator were made, and device parameters were equally acceptable and stable.  The device and leads were then placed within the pocket. VVIR     Infectious Disease progress Note    Requested by: Dr Marcus Renner    Reason: sepsis, mssa bacteremia    Current abx Prior abx   Nafcillin since 7/28-8/2  Cefazolin since 8/2 Pip/tazo, vancomycin 7/24-7/28     Lines:       Assessment :    Marcella Palmer is a 59 y.o. male with CAD, severe peripheral arterial insufficiency admitted to [facility] on 7/24/17 with gram positive bacteremia, exposed left groin vascular graft. Clinical picture c/w sepsis (POA) due to MSSA bloodstream infection (positive blood cultures 7/22, 7/24; negative blood cultures 7/28). Most likely source of MSSA bacteremia is infected left groin vascular graft. Patient appropriately underwent removal of infected graft, Repair of superficial femoral artery, left side; Repair of common femoral artery on 7/25/17. Intra op cx: MSSA. Transferred to ICU overnight for respiratory distress - likely volume overload from nafcillin - improved with diuresis    Clinically better    Recommendations:    1. cont cefazolin till 9/12/17  2. F/u vascular surgery recommendations  3. D/c planning per primary team    outpt iv abx orders written    Advance care plan: full code  Primary Decision Maker Name Rose Castro   Primary Decision Maker Phone Number        Above plan was discussed in details with patient, rn. Please call me if any further questions or concerns. Will continue to participate in the care of this patient. subjective:    Feels better. No sob. intermittent pain at the recent surgical site left groin. Patient denies headaches, visual disturbances, sore throat, runny nose, earaches, cp, sob, chills, cough, abdominal pain, diarrhea, burning micturition,  or weakness in extremities. He denies back pain/flank pain. Home Medication List    Details   amiodarone (CORDARONE) 200 mg tablet Take 1 Tab by mouth daily.  Indications: VENTRICULAR RATE CONTROL IN ATRIAL FIBRILLATION  Qty: 60 Tab, Refills: 0      clopidogrel (PLAVIX) 75 mg tab Take 1 Tab by mouth daily. Qty: 90 Tab, Refills: 3      oxyCODONE-acetaminophen (PERCOCET) 5-325 mg per tablet Take 2 Tabs by mouth every four (4) hours as needed. Max Daily Amount: 12 Tabs. Qty: 60 Tab, Refills: 0      gabapentin (NEURONTIN) 100 mg capsule Take 1 Cap by mouth two (2) times a day. Indications: NEUROPATHIC PAIN  Qty: 60 Cap, Refills: 9      aspirin 81 mg chewable tablet Take 1 Tab by mouth daily (with breakfast). Qty: 90 Tab, Refills: 3      cholecalciferol (VITAMIN D3) 1,000 unit tablet Take 1 Tab by mouth daily. Qty: 90 Tab, Refills: 3      losartan (COZAAR) 25 mg tablet Take 1 Tab by mouth daily. Indications: Chronic Heart Failure, hypertension  Qty: 90 Tab, Refills: 2      ascorbic acid, vitamin C, (VITAMIN C) 250 mg tablet Take 1 Tab by mouth daily (with breakfast). Qty: 90 Tab, Refills: 2      DULoxetine (CYMBALTA) 60 mg capsule Take 1 Cap by mouth daily. Qty: 90 Cap, Refills: 2      zinc sulfate (ZINCATE) 220 (50) mg capsule Take 1 Cap by mouth daily. Qty: 90 Cap, Refills: 0      ferrous sulfate 325 mg (65 mg iron) tablet Take 1 Tab by mouth daily (with breakfast). Qty: 90 Tab, Refills: 3      atorvastatin (LIPITOR) 40 mg tablet Take 1 Tab by mouth nightly. Indications: DYSLIPIDEMIA  Qty: 90 Tab, Refills: 3      metoprolol tartrate (LOPRESSOR) 25 mg tablet Take 0.5 Tabs by mouth every twelve (12) hours. Indications: hypertension, VENTRICULAR RATE CONTROL IN ATRIAL FIBRILLATION  Qty: 30 Tab, Refills: 6      dilTIAZem (CARDIZEM) 30 mg tablet Take 1 Tab by mouth Before breakfast, lunch, dinner and at bedtime. Indications: hypertension  Qty: 120 Tab, Refills: 6      cyanocobalamin (VITAMIN B-12) 1,000 mcg tablet Take 1 Tab by mouth daily.   Qty: 90 Tab, Refills: 3             Current Facility-Administered Medications   Medication Dose Route Frequency    warfarin (COUMADIN) tablet 5 mg  5 mg Oral ONCE    lactobacillus sp. 50 billion cpu (BIO-K PLUS) capsule 1 Cap  1 Cap Oral DAILY    glucose chewable tablet 16 g  4 Tab Oral PRN    glucagon (GLUCAGEN) injection 1 mg  1 mg IntraMUSCular PRN    dextrose (D50W) injection syrg 12.5-25 g  25-50 mL IntraVENous PRN    furosemide (LASIX) tablet 20 mg  20 mg Oral DAILY    potassium chloride (KLOR-CON) packet 20 mEq  20 mEq Oral BID WITH MEALS    ceFAZolin (ANCEF) 2g IVPB in 50 mL D5W  2 g IntraVENous Q8H    WARFARIN INFORMATION NOTE (COUMADIN)   Other Q24H    gabapentin (NEURONTIN) capsule 300 mg  300 mg Oral TID    oxyCODONE-acetaminophen (PERCOCET 10)  mg per tablet 1-2 Tab  1-2 Tab Oral Q4H PRN    polyethylene glycol (MIRALAX) packet 17 g  17 g Oral DAILY    0.9% sodium chloride infusion 250 mL  250 mL IntraVENous PRN    sodium chloride (NS) flush 5-10 mL  5-10 mL IntraVENous Q8H    sodium chloride (NS) flush 5-10 mL  5-10 mL IntraVENous PRN    naloxone (NARCAN) injection 0.1 mg  0.1 mg IntraVENous PRN    atorvastatin (LIPITOR) tablet 40 mg  40 mg Oral QHS    acetaminophen (TYLENOL) tablet 650 mg  650 mg Oral Q4H PRN    HYDROmorphone (PF) (DILAUDID) injection 1 mg  1 mg IntraVENous Q3H PRN    diphenhydrAMINE (BENADRYL) injection 12.5 mg  12.5 mg IntraVENous Q4H PRN    ondansetron (ZOFRAN) injection 4 mg  4 mg IntraVENous Q4H PRN    docusate sodium (COLACE) capsule 100 mg  100 mg Oral DAILY    enoxaparin (LOVENOX) injection 40 mg  40 mg SubCUTAneous Q24H    amiodarone (CORDARONE) tablet 200 mg  200 mg Oral DAILY    ascorbic acid (vitamin C) (VITAMIN C) tablet 250 mg  250 mg Oral DAILY    aspirin chewable tablet 81 mg  81 mg Oral DAILY    cholecalciferol (VITAMIN D3) tablet 1,000 Units  1,000 Units Oral DAILY    cyanocobalamin tablet 1,000 mcg  1,000 mcg Oral DAILY    dilTIAZem (CARDIZEM) IR tablet 30 mg  30 mg Oral QID    DULoxetine (CYMBALTA) capsule 60 mg  60 mg Oral DAILY    ferrous sulfate tablet 325 mg  1 Tab Oral DAILY WITH BREAKFAST    metoprolol tartrate (LOPRESSOR) tablet 12.5 mg  12.5 mg Oral Q12H    losartan (COZAAR) tablet 25 mg  25 mg Oral DAILY       Allergies: Morphine    Temp (24hrs), Av.4 °F (36.3 °C), Min:96.9 °F (36.1 °C), Max:98.1 °F (36.7 °C)    Visit Vitals    /82 (BP 1 Location: Left arm, BP Patient Position: At rest)    Pulse 68    Temp 98.1 °F (36.7 °C)    Resp 19    Ht 5' 10\" (1.778 m)    Wt 74.9 kg (165 lb 2 oz)    SpO2 94%    BMI 23.69 kg/m2       ROS: 12 point ROS obtained in details. Pertinent positives as mentioned in HPI,   otherwise negative    Physical Exam:    General: Well developed, well nourished male laying on the bed AAOx3 in no acute distress. General:   awake alert and oriented   HEENT:  Normocephalic, atraumatic, PERRL, EOMI, no scleral icterus or pallor; no conjunctival hemmohage;  nasal and oral mucous are moist and without evidence of lesions. No thrush. Neck supple, no bruits. Lymph Nodes:   no cervical, axillary or inguinal adenopathy   Lungs:   non-labored, bilaterally clear to auscultation- no crackles wheezes rales or rhonchi   Heart:  RRR, s1 and s2; no  rubs or gallops, no edema, + pedal pulses   Abdomen:  soft, non-distended, active bowel sounds, no hepatomegaly, no splenomegaly. Non-tender   Genitourinary:  deferred   Extremities:   no clubbing, cyanosis; no joint effusions or swelling; wound vac left groin; muscle mass appropriate for age, LLE cool to touch. Tenderness on palpation of shin LLE   Neurologic:  No gross focal sensory abnormalities; 5/5 muscle strength to upper and lower extremities. Speech appropriate.  Cranial nerves intact                        Skin:  Surgical changes left groin   Back:  no spinal or paraspinal muscle tenderness or rigidity, no CVA tenderness     Psychiatric:  No suicidal or homicidal ideations, appropriate mood and affect         Labs: Results:   Chemistry Recent Labs      17   0130  17   0130  17   0330   GLU  90  99  104*   NA  133*  134*  133*   K  3.9  3.8  3.9   CL  95*  97*  98*   CO2  29  29  29   BUN  8  7 8   CREA  0.67  0.72  0.96   CA  8.7  8.6  8.5   AGAP  9  8  6   BUCR  12  10*  8*   AP  774*  684*  735*   TP  7.7  7.3  7.2   ALB  2.4*  2.3*  2.3*   GLOB  5.3*  5.0*  4.9*   AGRAT  0.5*  0.5*  0.5*      CBC w/Diff Recent Labs      08/07/17   0130  08/06/17   0130  08/05/17   0330   WBC  12.7  11.9  10.5   RBC  2.98*  2.93*  2.86*   HGB  8.6*  8.4*  8.3*   HCT  26.4*  25.4*  24.9*   PLT  581*  548*  514*   GRANS  76*  77*  73   LYMPH  11*  14*  17*   EOS  1  1  1      Microbiology No results for input(s): CULT in the last 72 hours.        RADIOLOGY:    All available imaging studies/reports in [facility] care for this admission were reviewed    Dr. Anson Mirza, Infectious Disease Specialist  [phone]  August 7, 2017  4:21 PM

## 2021-05-25 ENCOUNTER — OFFICE VISIT (OUTPATIENT)
Dept: CARDIOLOGY CLINIC | Age: 61
End: 2021-05-25
Payer: COMMERCIAL

## 2021-05-25 VITALS
HEIGHT: 70 IN | HEART RATE: 82 BPM | OXYGEN SATURATION: 98 % | DIASTOLIC BLOOD PRESSURE: 72 MMHG | SYSTOLIC BLOOD PRESSURE: 124 MMHG | BODY MASS INDEX: 29.63 KG/M2 | WEIGHT: 207 LBS

## 2021-05-25 DIAGNOSIS — E78.5 DYSLIPIDEMIA: ICD-10-CM

## 2021-05-25 DIAGNOSIS — I25.5 ISCHEMIC CARDIOMYOPATHY: Primary | ICD-10-CM

## 2021-05-25 DIAGNOSIS — I25.10 CORONARY ARTERY DISEASE INVOLVING NATIVE CORONARY ARTERY OF NATIVE HEART WITHOUT ANGINA PECTORIS: ICD-10-CM

## 2021-05-25 DIAGNOSIS — I50.22 CHRONIC SYSTOLIC HEART FAILURE (HCC): ICD-10-CM

## 2021-05-25 DIAGNOSIS — I48.0 PAROXYSMAL ATRIAL FIBRILLATION (HCC): ICD-10-CM

## 2021-05-25 PROCEDURE — 99213 OFFICE O/P EST LOW 20 MIN: CPT | Performed by: NURSE PRACTITIONER

## 2021-05-25 NOTE — PATIENT INSTRUCTIONS
Continue present medication regimen  Try Zyrtec, Claritin, or Allegra for allergy symptoms  Follow-up with Dr. Kvng Padron as scheduled and as needed  When you call to activate the Savings card for Xarelto, ask about how the card can be used/or can it be used when the prescription was sent in to your mail order pharmacy (90 day supply, with refills)

## 2021-05-25 NOTE — PROGRESS NOTES
Elias Hall presents today for   Chief Complaint   Patient presents with    Follow-up     2 week follow up        Elias Hall preferred language for health care discussion is english/other. Is someone accompanying this pt? no    Is the patient using any DME equipment during [address]? no    Depression Screening:  3 most recent PHQ Screens 5/11/2021   PHQ Not Done -   Little interest or pleasure in doing things Not at all   Feeling down, depressed, irritable, or hopeless Not at all   Total Score PHQ 2 0       Learning Assessment:  Learning Assessment 10/1/2019   PRIMARY LEARNER Patient   HIGHEST LEVEL OF EDUCATION - PRIMARY LEARNER  -   BARRIERS PRIMARY LEARNER -   [facility]    NEED -   LEARNER PREFERENCE PRIMARY LISTENING   ANSWERED BY patient   RELATIONSHIP SELF       Abuse Screening:  Abuse Screening Questionnaire 5/11/2021   Do you ever feel afraid of your partner? N   Are you in a relationship with someone who physically or mentally threatens you? N   Is it safe for you to go home? Y       Fall Risk  Fall Risk Assessment, last 12 mths 3/16/2021   Able to walk? Yes   Fall in past 12 months? 0   Do you feel unsteady? -   Are you worried about falling -   Is TUG test greater than 12 seconds? -   Is the gait abnormal? -   Number of falls in past 12 months -   Fall with injury? -       Pt currently taking Anticoagulant therapy? ASA 81mg every day and Xarelto     Coordination of Care:  1. Have you been to the ER, urgent care clinic since your last visit? Hospitalized since your last visit? no    2. Have you seen or consulted any other health care providers outside of the [address] [place] [address] since your last visit? Include any pap smears or colon screening.  no

## 2021-05-26 DIAGNOSIS — I50.22 CHRONIC SYSTOLIC HEART FAILURE (HCC): Chronic | ICD-10-CM

## 2021-05-26 DIAGNOSIS — I50.20 BENIGN HYPERTENSIVE HEART DISEASE WITH SYSTOLIC CONGESTIVE HEART FAILURE, NYHA CLASS 2 (HCC): Chronic | ICD-10-CM

## 2021-05-26 DIAGNOSIS — I11.0 BENIGN HYPERTENSIVE HEART DISEASE WITH SYSTOLIC CONGESTIVE HEART FAILURE, NYHA CLASS 2 (HCC): Chronic | ICD-10-CM

## 2021-05-26 RX ORDER — FUROSEMIDE 20 MG/1
TABLET ORAL
Qty: 90 TABLET | Refills: 1 | Status: SHIPPED | OUTPATIENT
Start: 2021-05-26 | End: 2021-11-23 | Stop reason: SDUPTHER

## 2021-06-08 RX ORDER — DILTIAZEM HYDROCHLORIDE 180 MG/1
CAPSULE, COATED, EXTENDED RELEASE ORAL
Qty: 90 CAPSULE | Refills: 3 | Status: SHIPPED | OUTPATIENT
Start: 2021-06-08 | End: 2021-12-06 | Stop reason: SDUPTHER

## 2021-06-08 RX ORDER — DILTIAZEM HYDROCHLORIDE 120 MG/1
120 CAPSULE, COATED, EXTENDED RELEASE ORAL
Qty: 90 CAPSULE | Refills: 3 | Status: SHIPPED | OUTPATIENT
Start: 2021-06-08 | End: 2021-12-06 | Stop reason: SDUPTHER

## 2021-07-26 RX ORDER — METOPROLOL TARTRATE 50 MG/1
50 TABLET ORAL 2 TIMES DAILY
Qty: 180 TABLET | Refills: 0 | Status: SHIPPED | OUTPATIENT
Start: 2021-07-26 | End: 2021-10-29 | Stop reason: SDUPTHER

## 2021-07-26 NOTE — TELEPHONE ENCOUNTER
This patient contacted office for the following prescriptions to be filled:    Last office visit: 4/20/21  Follow up appointment: 9/14/21  Medication requested :   Requested Prescriptions     Pending Prescriptions Disp Refills    metoprolol tartrate (Lopressor) 50 mg tablet 180 Tablet 0     Sig: Take 1 Tablet by mouth two (2) times a day.      PCP: sushila  Mail order or Local pharmacy name  [facility] [phone]

## 2021-09-14 ENCOUNTER — OFFICE VISIT (OUTPATIENT)
Dept: FAMILY MEDICINE CLINIC | Age: 61
End: 2021-09-14
Payer: COMMERCIAL

## 2021-09-14 VITALS
SYSTOLIC BLOOD PRESSURE: 132 MMHG | WEIGHT: 192 LBS | TEMPERATURE: 97.4 F | RESPIRATION RATE: 16 BRPM | HEART RATE: 88 BPM | HEIGHT: 70 IN | OXYGEN SATURATION: 99 % | BODY MASS INDEX: 27.49 KG/M2 | DIASTOLIC BLOOD PRESSURE: 80 MMHG

## 2021-09-14 DIAGNOSIS — I48.0 PAROXYSMAL ATRIAL FIBRILLATION (HCC): ICD-10-CM

## 2021-09-14 DIAGNOSIS — E78.5 DYSLIPIDEMIA: Primary | ICD-10-CM

## 2021-09-14 DIAGNOSIS — E55.9 VITAMIN D DEFICIENCY: ICD-10-CM

## 2021-09-14 DIAGNOSIS — I73.9 PERIPHERAL ARTERY DISEASE (HCC): ICD-10-CM

## 2021-09-14 DIAGNOSIS — Z89.612 STATUS POST ABOVE-KNEE AMPUTATION OF LEFT LOWER EXTREMITY (HCC): ICD-10-CM

## 2021-09-14 DIAGNOSIS — I25.10 CORONARY ARTERY DISEASE INVOLVING NATIVE CORONARY ARTERY OF NATIVE HEART WITHOUT ANGINA PECTORIS: ICD-10-CM

## 2021-09-14 PROCEDURE — 99214 OFFICE O/P EST MOD 30 MIN: CPT | Performed by: FAMILY MEDICINE

## 2021-09-14 NOTE — PROGRESS NOTES
1. Have you been to the ER, urgent care clinic since your last visit? Hospitalized since your last visit? No    2. Have you seen or consulted any other health care providers outside of the [address] [place] [address] since your last visit? Include any pap smears or colon screening.  Dr. Robert Price podiatry

## 2021-09-14 NOTE — PATIENT INSTRUCTIONS
A Healthy Heart: Care Instructions  Your Care Instructions     Coronary artery disease, also called heart disease, occurs when a substance called plaque builds up in the vessels that supply oxygen-rich blood to your heart muscle. This can narrow the blood vessels and reduce blood flow. A heart attack happens when blood flow is completely blocked. A high-fat diet, smoking, and other factors increase the risk of heart disease. Your doctor has found that you have a chance of having heart disease. You can do lots of things to keep your heart healthy. It may not be easy, but you can change your diet, exercise more, and quit smoking. These steps really work to lower your chance of heart disease. Follow-up care is a key part of your treatment and safety. Be sure to make and go to all appointments, and call your doctor if you are having problems. It's also a good idea to know your test results and keep a list of the medicines you take. How can you care for yourself at home? Diet    · Use less salt when you cook and eat. This helps lower your blood pressure. Taste food before salting. Add only a little salt when you think you need it. With time, your taste buds will adjust to less salt.     · Eat fewer snack items, fast foods, canned soups, and other high-salt, high-fat, processed foods.     · Read food labels and try to avoid saturated and trans fats. They increase your risk of heart disease by raising cholesterol levels.     · Limit the amount of solid fat-butter, margarine, and shortening-you eat. Use olive, peanut, or canola oil when you cook. Bake, broil, and steam foods instead of frying them.     · Eat a variety of fruit and vegetables every day. Dark green, deep orange, red, or yellow fruits and vegetables are especially good for you. Examples include spinach, carrots, peaches, and berries.     · Foods high in fiber can reduce your cholesterol and provide important vitamins and minerals.  High-fiber foods include whole-grain cereals and breads, oatmeal, beans, brown rice, citrus fruits, and apples.     · Eat lean proteins. Heart-healthy proteins include seafood, lean meats and poultry, eggs, beans, peas, nuts, seeds, and soy products.     · Limit drinks and foods with added sugar. These include candy, desserts, and soda pop. Lifestyle changes    · If your doctor recommends it, get more exercise. Walking is a good choice. Bit by bit, increase the amount you walk every day. Try for at least 30 minutes on most days of the week. You also may want to swim, bike, or do other activities.     · Do not smoke. If you need help quitting, talk to your doctor about stop-smoking programs and medicines. These can increase your chances of quitting for good. Quitting smoking may be the most important step you can take to protect your heart. It is never too late to quit.     · Limit alcohol to 2 drinks a day for men and 1 drink a day for women. Too much alcohol can cause health problems.     · Manage other health problems such as diabetes, high blood pressure, and high cholesterol. If you think you may have a problem with alcohol or drug use, talk to your doctor. Medicines    · Take your medicines exactly as prescribed. Call your doctor if you think you are having a problem with your medicine.     · If your doctor recommends aspirin, take the amount directed each day. Make sure you take aspirin and not another kind of pain reliever, such as acetaminophen (Tylenol). When should you call for help? Call 911 if you have symptoms of a heart attack. These may include:    · Chest pain or pressure, or a strange feeling in the chest.     · Sweating.     · Shortness of breath.     · Pain, pressure, or a strange feeling in the back, neck, jaw, or upper belly or in one or both shoulders or arms.     · Lightheadedness or sudden weakness.     · A fast or irregular heartbeat.    After you call 911, the  may tell you to chew 1 adult-strength or 2 to 4 low-dose aspirin. Wait for an ambulance. Do not try to drive yourself. Watch closely for changes in your health, and be sure to contact your doctor if you have any problems. Where can you learn more? Go to http://www.gomes.com/  Enter F075 in the search box to learn more about \"A Healthy Heart: Care Instructions. \"  Current as of: August 31, 2020               Content Version: 12.8  © 2006-2021 [employer]. Care instructions adapted under license by [employer] (which disclaims liability or warranty for this information). If you have questions about a medical condition or this instruction, always ask your healthcare professional. [address] any warranty or liability for your use of this information.

## 2021-09-14 NOTE — PROGRESS NOTES
Cholo Ervin, 59 y.o.,  male    SUBJECTIVE  Ff-up     No new concerns    Presented with RLE pain w/ known h/o PAD. underwent thrombectomy 4/1/2021   s/p AKA Left leg and complicated revasculariation (2017).  Afib- reviewed notes placed on cardizem, metoprolol and now xarelto (?pruitus on eliquis) EF 55%    H/o CAD s/p angioplasty- continued on asa, BB.  plavix was discontinued Stress test 11/2020 low risk study. HTN- currently on cozaar, metoprolol      ROS:  See HPI, all others negative        Patient Active Problem List   Diagnosis Code    Benign hypertensive heart disease with systolic congestive heart failure, NYHA class 2 (HCC) I11.0, I50.20    DDD (degenerative disc disease), lumbar M51.36    Erectile dysfunction N52.9    Spinal stenosis of lumbar region with radiculopathy M48.061, M54.16    Hereditary peripheral neuropathy G60.9    Aortoiliac occlusive disease ([address] I74.09    Atherosclerosis of native artery of both lower extremities with intermittent claudication ([address] I70.213    Peripheral artery disease ([address] I73.9    Coronary artery disease involving native coronary artery of native heart I25.10    Paroxysmal atrial fibrillation (HCC) I48.0    Acute blood loss as cause of postoperative anemia D62    Impaired mobility and ADLs Z74.09, Z78.9    Ischemic cardiomyopathy I25.5    Chronic systolic heart failure (HCC) I50.22    Carotid artery disease ([facility]) I77.9    Dyslipidemia E78.5    Euthyroid sick syndrome E07.81    Aftercare following surgery of the circulatory system Z48.812    Status post femoral-popliteal bypass surgery Z95.828    History of cardioversion Z98.890    Critical ischemia of lower extremity (HCC) I70.229    History of vitamin D deficiency Z86.39    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 7XXA    Chronic anemia D64.9    Chronic ulcer of right heel ([address] L97.419    Ischemic rest pain of lower extremity M79.606, I99.8    Prolonged Q-T interval on ECG R94.31    Status post above-knee amputation of left lower extremity ([facility] J83.968    Aftercare for amputation stump Z47.81    Moderate to severe pulmonary hypertension ([facility]) I27.20    Adverse effect of amiodarone T46.2X5A    Skin ulcer of malleolar area of right ankle ([facility]) L97.319    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[facility]) I70.201       Current Outpatient Medications   Medication Sig Dispense Refill    metoprolol tartrate (Lopressor) 50 mg tablet Take 1 Tablet by mouth two (2) times a day. 180 Tablet 0    dilTIAZem ER (CARDIZEM CD) 180 mg capsule Take 1 tablet by mouth once every morning. 90 Capsule 3    dilTIAZem ER (CARDIZEM CD) 120 mg capsule Take 1 Capsule by mouth nightly. 90 Capsule 3    furosemide (LASIX) 20 mg tablet TAKE 1 TABLET DAILY 90 Tablet 1    rivaroxaban (Xarelto) 20 mg tab tablet Take 1 Tablet by mouth daily. 90 Tablet 3    atorvastatin (LIPITOR) 80 mg tablet TAKE 1 TABLET ONCE DAILY 90 Tab 3    losartan (COZAAR) 100 mg tablet TAKE 1 TABLET ONCE DAILY 90 Tab 3    aspirin 81 mg chewable tablet Take 1 Tab by mouth daily. 90 Tab 3    gabapentin (NEURONTIN) 400 mg capsule take 1 capsule by mouth three times a day 270 Cap 0    multivitamin (ONE A DAY) tablet Take 1 Tab by mouth daily. Allergies   Allergen Reactions    Morphine Other (comments)     Patient gets confused with morphine.        Past Medical History:   Diagnosis Date    Acute blood loss as cause of postoperative anemia 4/4/2017    Anticoagulated on Coumadin     Aortoiliac occlusive disease (HCC) 1/25/2017    Atherosclerosis of native artery of both lower extremities with intermittent claudication ([facility] 1/25/2017    Benign hypertensive heart disease with systolic congestive heart failure, NYHA class 2 ([facility] 1/26/2015    Carotid artery disease (HCC)     Chronic anemia     Chronic systolic heart failure ([facility])     Chronic ulcer of right foot ([facility] 4/4/2017    Chronic ulcer of right heel ([facility] 8/8/2017    Coronary artery disease involving native coronary artery of native heart 3/15/2017    Successful stenting of Cx (Xience LESLEY) and RCA (Xience LESLEY) to 0% by Dr. Adam Garcia on 3/15/17.  DDD (degenerative disc disease), lumbar 1/26/2015    Dyslipidemia     Erectile dysfunction 7/5/2016    Euthyroid sick syndrome 4/6/2017    Hereditary peripheral neuropathy 11/15/2016    History of cardioversion 4/18/2017    S/P Synchronized external cardioversion (4/18/2017 - Dr. Maddy Wood)    History of vitamin D deficiency 4/22/2017    Vitamin D 25-Hydroxy (4/22/2017) = 12.0; Vitamin D 25-Hydroxy (5/26/2017) = 38.7    Infection of vascular bypass graft ([facility] 7/24/2017    Left lower extremity    Ischemic cardiomyopathy     Moderate to severe pulmonary hypertension ([address] 7/23/2017    Paroxysmal atrial fibrillation (HCC)     Peripheral artery disease ([facility] 1/25/2017    Skin ulcer of malleolar area of right ankle ([facility]     Spinal stenosis of lumbar region with radiculopathy 5/4/2015    Dr. Shay Turk       Social History     Socioeconomic History    Marital status: LEGALLY      Spouse name: Not on file    Number of children: Not on file    Years of education: Not on file    Highest education level: Not on file   Occupational History    Not on file   Tobacco Use    Smoking status: Former Smoker    Smokeless tobacco: Never Used    Tobacco comment: quit in 2011   Substance and Sexual Activity    Alcohol use:  Yes     Alcohol/week: 2.0 standard drinks     Types: 2 Cans of beer per week     Comment: 10 cans of beer a month    Drug use: Yes     Types: Marijuana     Comment: occasionally-last used 4/17    Sexual activity: Yes     Partners: Female   Other Topics Concern    Not on file   Social History Narrative    Not on file     Social Determinants of Health     Financial Resource Strain:     Difficulty of Paying Living Expenses:    Food Insecurity:     Worried About Running Out of Food in the Last Year:     [address] in the Last Year:    Transportation Needs:     Lack of Transportation (Medical):  Lack of Transportation (Non-Medical):    Physical Activity:     Days of Exercise per Week:     Minutes of Exercise per Session:    Stress:     Feeling of Stress :    Social Connections:     Frequency of Communication with Friends and Family:     Frequency of Social Gatherings with Friends and Family:     Attends [employer] Services:     Active Member of Clubs or Organizations:     Attends Club or Organization Meetings:     Marital Status:    Intimate Partner Violence:     Fear of Current or Ex-Partner:     Emotionally Abused:     Physically Abused:     Sexually Abused:        Family History   Problem Relation Age of Onset    Heart Disease Father          OBJECTIVE    Physical Exam:     Visit Vitals  /80 (BP 1 Location: Left upper arm, BP Patient Position: Sitting, BP Cuff Size: Adult)   Pulse 88   Temp 97.4 °F (36.3 °C) (Oral)   Resp 16   Ht 5' 10\" (1.778 m)   Wt 192 lb (87.1 kg)   SpO2 99%   BMI 27.55 kg/m²       General: alert, chronically ill-appearing,on wheelchair, in no apparent distress or pain  Neck: supple, no adenopathy palpated  CVS: normal rate, IRIR, distinct S1 and S2  Lungs:clear to ausculation bilaterally, no crackles, wheezing or rhonchi noted  Abdomen: normoactive bowel sounds, soft, non-tender  Extremities: L AKA prosthestic in place  Skin: no edema  Psych:  mood and affect normal        ASSESSMENT/PLAN  Diagnoses and all orders for this visit:    1. Peripheral artery disease ([address]  S/p thrombectomy 4/2021  S/p AKA 2017  On asa,statin  Following vasc sx    2. Paroxysmal atrial fibrillation (HCC)  Rate controlled, anticoagulated  On xarelto, ccb, BB       CBC WITH AUTOMATED DIFF; Future  -     METABOLIC PANEL, COMPREHENSIVE; Future  Following cardiology    3.  Dyslipidemia  LDL goal <70  Cont statin  Lipid panel soon    4. Atherosclerosis of native artery of both lower extremities with intermittent claudication (HCC)  On asa, BB    5. Vitamin d deficiency  Hx/o monitoring  Check level soon    6. Status post above knee amputation Left lower extremity ([facility]    Follow-up and Dispositions    · Return in about 6 months (around 3/14/2022), or if symptoms worsen or fail to improve, for labs soon, routine chronic illness care. Patient understands plan of care. Patient has provided input and agrees with goals.

## 2021-10-07 RX ORDER — LOSARTAN POTASSIUM 100 MG/1
100 TABLET ORAL DAILY
Qty: 90 TABLET | Refills: 3 | Status: SHIPPED | OUTPATIENT
Start: 2021-10-07 | End: 2022-09-28

## 2021-10-07 RX ORDER — ATORVASTATIN CALCIUM 80 MG/1
TABLET, FILM COATED ORAL
Qty: 90 TABLET | Refills: 3 | Status: SHIPPED | OUTPATIENT
Start: 2021-10-07 | End: 2022-09-28

## 2021-10-07 NOTE — TELEPHONE ENCOUNTER
This pharmacy faxed over request for the following prescriptions to be filled:    Medication requested :   Requested Prescriptions     Pending Prescriptions Disp Refills    losartan (COZAAR) 100 mg tablet 90 Tablet 3     Sig: Take 1 Tablet by mouth daily.     atorvastatin (LIPITOR) 80 mg tablet 90 Tablet 3     Sig: TAKE 1 TABLET ONCE DAILY     PCP: [address] [place] [address] or Print: [facility]  Mail order or Local pharmacy Mail Order    Scheduled appointment if not seen by current providers in office: lov 9/14/2021 f/u 3/15/2022

## 2021-10-26 ENCOUNTER — OFFICE VISIT (OUTPATIENT)
Dept: CARDIOLOGY CLINIC | Age: 61
End: 2021-10-26
Payer: COMMERCIAL

## 2021-10-26 VITALS
HEIGHT: 70 IN | HEART RATE: 99 BPM | WEIGHT: 193 LBS | DIASTOLIC BLOOD PRESSURE: 72 MMHG | BODY MASS INDEX: 27.63 KG/M2 | OXYGEN SATURATION: 99 % | SYSTOLIC BLOOD PRESSURE: 118 MMHG

## 2021-10-26 DIAGNOSIS — I70.213 ATHEROSCLEROSIS OF NATIVE ARTERY OF BOTH LOWER EXTREMITIES WITH INTERMITTENT CLAUDICATION (HCC): Primary | ICD-10-CM

## 2021-10-26 PROCEDURE — 93000 ELECTROCARDIOGRAM COMPLETE: CPT | Performed by: INTERNAL MEDICINE

## 2021-10-26 PROCEDURE — 99214 OFFICE O/P EST MOD 30 MIN: CPT | Performed by: INTERNAL MEDICINE

## 2021-10-26 NOTE — PROGRESS NOTES
Kerri Eason presents today for   Chief Complaint   Patient presents with    Follow-up     6 month follow up        Kerri Eason preferred language for health care discussion is english/other. Is someone accompanying this pt? no    Is the patient using any DME equipment during [address]? no    Depression Screening:  3 most recent PHQ Screens 10/26/2021   PHQ Not Done -   Little interest or pleasure in doing things Not at all   Feeling down, depressed, irritable, or hopeless Not at all   Total Score PHQ 2 0       Learning Assessment:  Learning Assessment 10/1/2019   PRIMARY LEARNER Patient   HIGHEST LEVEL OF EDUCATION - PRIMARY LEARNER  -   BARRIERS PRIMARY LEARNER -   [facility]    NEED -   LEARNER PREFERENCE PRIMARY LISTENING   ANSWERED BY patient   RELATIONSHIP SELF       Abuse Screening:  Abuse Screening Questionnaire 10/26/2021   Do you ever feel afraid of your partner? N   Are you in a relationship with someone who physically or mentally threatens you? N   Is it safe for you to go home? Y       Fall Risk  Fall Risk Assessment, last 12 mths 3/16/2021   Able to walk? Yes   Fall in past 12 months? 0   Do you feel unsteady? -   Are you worried about falling -   Is TUG test greater than 12 seconds? -   Is the gait abnormal? -   Number of falls in past 12 months -   Fall with injury? -       Pt currently taking Anticoagulant therapy? Xarelto 20mg     Coordination of Care:  1. Have you been to the ER, urgent care clinic since your last visit? Hospitalized since your last visit? no    2. Have you seen or consulted any other health care providers outside of the [address] [place] [address] since your last visit? Include any pap smears or colon screening.  no

## 2021-10-26 NOTE — PROGRESS NOTES
HISTORY OF PRESENT ILLNESS  Bud Garcia is a 59 y.o. male. ASSESSMENT and PLAN    Mr. Mary Richards, previous Dr. May Ill patient, has known history of PVD, as well as CAD. His coronary angiography back in 2017 showed EF 30% with mild left main and LAD disease. Circumflex had proximal 70% lesion as well as trifurcation healed ruptured plaque with residual 90% stenosis. RCA had diffuse 50% with focal mid 90% disease. Subsequently, he had his circumflex and RCA stented to 0%. His nuclear scan back in November 2020 showed low risk finding with EF 51%. 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 He has PAF, as well as dyslipidemia. He was hospitalized March 2021 at [facility] for right leg arterial thrombectomy. He was noted to be in atrial fibrillation. · CAD:    Symptomatically stable. · BP:    Well controlled. · CAF:    Rate controlled on Eliquis. · CHF:    There is no evidence of decompensated CHF noted. · Weight:     His weight today is 193 pounds. This is near his baseline. · Cholesterol:   Target LDL <70. Lipitor 80. · Tobacco:    He denies active tobacco use. · Anti-platelet:   Remains on Eliquis. I will see him back in 6 months. Is beta-blocker can be increased if needed. Thank you. Encounter Diagnoses   Name Primary?  Atherosclerosis of native artery of both lower extremities with intermittent claudication ([facility] [address] Yes     current treatment plan is effective, no change in therapy  lab results and schedule of future lab studies reviewed with patient  reviewed diet, exercise and weight control  very strongly urged to quit smoking to reduce cardiovascular risk  cardiovascular risk and specific lipid/LDL goals reviewed  use of aspirin to prevent MI and TIA's discussed      HPI   Today, Mr. Rigo Delgado has no complaints of chest pains, or increased dyspnea on exertion. Because of his left AKA, he uses crutches to ambulate.   He has not noted any recent changes. He denies any orthopnea or PND. He denies any palpitation sensation despite being in atrial fibrillation. He denies any dizziness. Review of Systems   Respiratory: Negative for shortness of breath. Cardiovascular: Negative for chest pain, palpitations, orthopnea, claudication, leg swelling and PND. All other systems reviewed and are negative. Physical Exam  Vitals and nursing note reviewed. Constitutional:       Appearance: Normal appearance. HENT:      Head: Normocephalic. Eyes:      Conjunctiva/sclera: Conjunctivae normal.   Neck:      Vascular: No carotid bruit. Cardiovascular:      Rate and Rhythm: Normal rate. Rhythm irregular. Pulmonary:      Breath sounds: Normal breath sounds. Abdominal:      Palpations: Abdomen is soft. Musculoskeletal:         General: No swelling. Cervical back: No rigidity. Skin:     General: Skin is warm and dry. Neurological:      General: No focal deficit present. Mental Status: He is alert and oriented to person, place, and time. Psychiatric:         Mood and Affect: Mood normal.         Behavior: Behavior normal.         PCP: Shanna Flores MD    Past Medical History:   Diagnosis Date    Acute blood loss as cause of postoperative anemia 4/4/2017    Anticoagulated on Coumadin     Aortoiliac occlusive disease ([address] 1/25/2017    Atherosclerosis of native artery of both lower extremities with intermittent claudication ([address] 1/25/2017    Benign hypertensive heart disease with systolic congestive heart failure, NYHA class 2 ([address] 1/26/2015    Carotid artery disease (HCC)     Chronic anemia     Chronic systolic heart failure (HCC)     Chronic ulcer of right foot ([address] 4/4/2017    Chronic ulcer of right heel ([address] 8/8/2017    Coronary artery disease involving native coronary artery of native heart 3/15/2017    Successful stenting of Cx (Xience LESLEY) and RCA (Xience LESLEY) to 0% by Dr. iNesha Harrison on 3/15/17.     DDD (degenerative disc disease), lumbar 1/26/2015    Dyslipidemia     Erectile dysfunction 7/5/2016    Euthyroid sick syndrome 4/6/2017    Hereditary peripheral neuropathy 11/15/2016    History of cardioversion 4/18/2017    S/P Synchronized external cardioversion (4/18/2017 - Dr. Susan Lantigua)    History of vitamin D deficiency 4/22/2017    Vitamin D 25-Hydroxy (4/22/2017) = 12.0; Vitamin D 25-Hydroxy (5/26/2017) = 38.7    Infection of vascular bypass graft ([facility] [address] 7/24/2017    Left lower extremity    Ischemic cardiomyopathy     Moderate to severe pulmonary hypertension ([address] 7/23/2017    Paroxysmal atrial fibrillation ([facility] [address]     Peripheral artery disease ([address] 1/25/2017    Skin ulcer of malleolar area of right ankle ([address]     Spinal stenosis of lumbar region with radiculopathy 5/4/2015    Dr. Sid Tam       Past Surgical History:   Procedure Laterality Date    CARDIAC CATHETERIZATION  3/8/2017         CARDIAC CATHETERIZATION  3/15/2017         CORONARY STENT SINGLE W/PTCA  3/15/2017         HX ABOVE KNEE AMPUTATION Left 08/18/2017    S/P Left above-the-knee amputation (8/18/2017 - Dr. Keven Fu)    HX ATHERECTOMY Left 06/15/2017    S/P Atherectomy and balloon angioplasty of the left superficial femoral artery and above-knee popliteal artery (6/15/2017 - Dr. Keven Fu)    HX ATHERECTOMY Right 09/07/2017    S/P Atherectomy and balloon angioplasty of the below-the-knee popliteal artery, above-the-knee popliteal artery, tibioperoneal trunk artery and posterior tibial artery (9/7/2017 - Dr. Keven Fu)    HX COLONOSCOPY  07/23/2015    polyps repeat 2020 5 years Teagan Henderson    [address] Right 04/04/2017    S/P Right axillary to bifemoral artery bypass using an 8 mm Propaten graft from the right axilla to the right common femoral artery with a jump femoral-femoral bypass with another 8 mm Propaten external ring bypass; Right common femoral artery, and profunda femoris artery and superficial femoral artery endarterectomy causing an extensive surgery on the right side (4/4/2017 - Dr. Valery Chan)   [address] Right 04/07/2017    S/P Cutdown of femoral-femoral bypass, more on the left groin side; Right lower extremity angiography with first-order catheterization (4/7/2017 - Dr. Monie Jaime)    HX FEMORAL BYPASS Right 04/10/2017    S/P Right femoral to above-knee popliteal bypass with an 8 mm PTFE graft (4/10/2017 - Dr. Miguel A Wray)    HX OTHER SURGICAL Left 07/25/2017    S/P Removal of infected graft; Repair of left superficial femoral artery; Repair of left common femoral artery (7/25/2017 - Dr. Monie Jaime)    HX OTHER SURGICAL Right 06/27/2017    S/P Drainage of right side abscess (6/27/2017 - Dr. Monie Jaime)    HX OTHER SURGICAL Left 07/01/2017    S/P Left groin exploration; Left femoral repair pseudoaneurysm; Left wound washout; Wound VAC placement (7/01/2017 - Dr. Monie Jaime)    HX OTHER SURGICAL Left 07/04/2017    S/P Left common femoral artery ligation; Jump bypass from right to left fem-fem to a more distal superficial femoral artery using 8 mm external ringed Propaten graft; Left groin wound exploration and washout (7/4/2017 - Dr. Monie Jaime)    HX OTHER SURGICAL Right 08/18/2017    S/P Right axillary femoral jump bypass interposition; Removal of infected right axillary femoral bypass; Wound VAC placement of upper thigh 1.2 cm x 5.2 cm x 1.8 cm and groin 4 cm x 0.5 cm x 0.2 cm (8/18/2017 - Dr. Monie Jaime)       Current Outpatient Medications   Medication Sig Dispense Refill    losartan (COZAAR) 100 mg tablet Take 1 Tablet by mouth daily. 90 Tablet 3    atorvastatin (LIPITOR) 80 mg tablet TAKE 1 TABLET ONCE DAILY 90 Tablet 3    metoprolol tartrate (Lopressor) 50 mg tablet Take 1 Tablet by mouth two (2) times a day.  180 Tablet 0    dilTIAZem ER (CARDIZEM CD) 180 mg capsule Take 1 tablet by mouth once every morning. 90 Capsule 3    dilTIAZem ER (CARDIZEM CD) 120 mg capsule Take 1 Capsule by mouth nightly. 90 Capsule 3    furosemide (LASIX) 20 mg tablet TAKE 1 TABLET DAILY 90 Tablet 1    rivaroxaban (Xarelto) 20 mg tab tablet Take 1 Tablet by mouth daily. 90 Tablet 3    aspirin 81 mg chewable tablet Take 1 Tab by mouth daily. 90 Tab 3    gabapentin (NEURONTIN) 400 mg capsule take 1 capsule by mouth three times a day 270 Cap 0    multivitamin (ONE A DAY) tablet Take 1 Tab by mouth daily. The patient has a family history of    Social History     Tobacco Use    Smoking status: Former Smoker    Smokeless tobacco: Never Used    Tobacco comment: quit in 2011   Substance Use Topics    Alcohol use: Yes     Alcohol/week: 2.0 standard drinks     Types: 2 Cans of beer per week     Comment: 10 cans of beer a month    Drug use: Yes     Types: Marijuana     Comment: occasionally-last used 4/17       Lab Results   Component Value Date/Time    Cholesterol, total 105 09/08/2020 07:09 AM    HDL Cholesterol 26 (L) 09/08/2020 07:09 AM    LDL, calculated 60.4 09/08/2020 07:09 AM    Triglyceride 93 09/08/2020 07:09 AM    CHOL/HDL Ratio 4.0 09/08/2020 07:09 AM        BP Readings from Last 3 Encounters:   10/26/21 118/72   09/14/21 132/80   05/25/21 124/72        Pulse Readings from Last 3 Encounters:   10/26/21 99   09/14/21 88   05/25/21 82       Wt Readings from Last 3 Encounters:   10/26/21 87.5 kg (193 lb)   09/14/21 87.1 kg (192 lb)   05/25/21 93.9 kg (207 lb)         EKG: unchanged from previous tracings, nonspecific ST and T waves changes, atrial fibrillation, rate 99.

## 2021-10-29 RX ORDER — METOPROLOL TARTRATE 50 MG/1
50 TABLET ORAL 2 TIMES DAILY
Qty: 180 TABLET | Refills: 0 | Status: SHIPPED | OUTPATIENT
Start: 2021-10-29 | End: 2022-01-24

## 2021-11-23 DIAGNOSIS — I50.22 CHRONIC SYSTOLIC HEART FAILURE (HCC): Chronic | ICD-10-CM

## 2021-11-23 DIAGNOSIS — I11.0 BENIGN HYPERTENSIVE HEART DISEASE WITH SYSTOLIC CONGESTIVE HEART FAILURE, NYHA CLASS 2 (HCC): Chronic | ICD-10-CM

## 2021-11-23 DIAGNOSIS — I50.20 BENIGN HYPERTENSIVE HEART DISEASE WITH SYSTOLIC CONGESTIVE HEART FAILURE, NYHA CLASS 2 (HCC): Chronic | ICD-10-CM

## 2021-11-23 RX ORDER — FUROSEMIDE 20 MG/1
20 TABLET ORAL DAILY
Qty: 90 TABLET | Refills: 1 | Status: SHIPPED | OUTPATIENT
Start: 2021-11-23 | End: 2022-05-23

## 2021-11-23 NOTE — TELEPHONE ENCOUNTER
This pharmacy faxed over request for the following prescriptions to be filled:    Medication requested :   Requested Prescriptions     Pending Prescriptions Disp Refills    furosemide (LASIX) 20 mg tablet 90 Tablet 1     Sig: Take 1 Tablet by mouth daily.      PCP: [address] [place] [address] or Print: [facility]  Mail order or Local pharmacy Mail Order     Scheduled appointment if not seen by current providers in office: [address] [place] [address] 9/14/2021 f/u 3/15/2021

## 2021-12-07 RX ORDER — DILTIAZEM HYDROCHLORIDE 120 MG/1
120 CAPSULE, COATED, EXTENDED RELEASE ORAL
Qty: 30 CAPSULE | Refills: 6 | Status: SHIPPED | OUTPATIENT
Start: 2021-12-07

## 2021-12-07 RX ORDER — DILTIAZEM HYDROCHLORIDE 180 MG/1
CAPSULE, COATED, EXTENDED RELEASE ORAL
Qty: 30 CAPSULE | Refills: 6 | Status: SHIPPED | OUTPATIENT
Start: 2021-12-07

## 2021-12-14 ENCOUNTER — TRANSCRIBE ORDER (OUTPATIENT)
Dept: SCHEDULING | Age: 61
End: 2021-12-14

## 2021-12-14 DIAGNOSIS — T82.392A OTHER MECHANICAL COMPLICATION OF FEMORAL ARTERIAL GRAFT (BYPASS), INITIAL ENCOUNTER (HCC): ICD-10-CM

## 2021-12-14 DIAGNOSIS — I70.211 ATHEROSCLEROSIS OF NATIVE ARTERY OF RIGHT LOWER EXTREMITY WITH INTERMITTENT CLAUDICATION (HCC): Primary | ICD-10-CM

## 2021-12-14 DIAGNOSIS — I70.221 ATHEROSCLEROSIS OF NATIVE ARTERY OF RIGHT LOWER EXTREMITY WITH REST PAIN (HCC): ICD-10-CM

## 2021-12-15 ENCOUNTER — HOSPITAL ENCOUNTER (OUTPATIENT)
Dept: LAB | Age: 61
Discharge: HOME | End: 2021-12-15
Payer: COMMERCIAL

## 2021-12-15 ENCOUNTER — ANESTHESIA EVENT (OUTPATIENT)
Dept: CARDIOTHORACIC SURGERY | Age: 61
DRG: 271 | End: 2021-12-15
Payer: COMMERCIAL

## 2021-12-15 ENCOUNTER — HOSPITAL ENCOUNTER (OUTPATIENT)
Dept: GENERAL RADIOLOGY | Age: 61
Discharge: HOME | End: 2021-12-15
Attending: SURGERY
Payer: COMMERCIAL

## 2021-12-15 ENCOUNTER — HOSPITAL ENCOUNTER (OUTPATIENT)
Dept: CT IMAGING | Age: 61
Discharge: HOME | End: 2021-12-15
Attending: SURGERY
Payer: COMMERCIAL

## 2021-12-15 DIAGNOSIS — E55.9 VITAMIN D DEFICIENCY: ICD-10-CM

## 2021-12-15 DIAGNOSIS — I70.221 ATHEROSCLEROSIS OF NATIVE ARTERY OF RIGHT LOWER EXTREMITY WITH REST PAIN (HCC): ICD-10-CM

## 2021-12-15 DIAGNOSIS — Z01.811 PRE-OP CHEST EXAM: ICD-10-CM

## 2021-12-15 DIAGNOSIS — T82.392A OTHER MECHANICAL COMPLICATION OF FEMORAL ARTERIAL GRAFT (BYPASS), INITIAL ENCOUNTER (HCC): ICD-10-CM

## 2021-12-15 DIAGNOSIS — E78.5 DYSLIPIDEMIA: ICD-10-CM

## 2021-12-15 DIAGNOSIS — I25.10 CORONARY ARTERY DISEASE INVOLVING NATIVE CORONARY ARTERY OF NATIVE HEART WITHOUT ANGINA PECTORIS: ICD-10-CM

## 2021-12-15 LAB
25(OH)D3 SERPL-MCNC: 44.2 NG/ML (ref 30–100)
ALBUMIN SERPL-MCNC: 3.7 G/DL (ref 3.4–5)
ALBUMIN/GLOB SERPL: 0.9 {RATIO} (ref 0.8–1.7)
ALP SERPL-CCNC: 167 U/L (ref 45–117)
ALT SERPL-CCNC: 26 U/L (ref 16–61)
ANION GAP SERPL CALC-SCNC: 4 MMOL/L (ref 3–18)
ANION GAP SERPL CALC-SCNC: 4 MMOL/L (ref 3–18)
AST SERPL-CCNC: 55 U/L (ref 10–38)
ATRIAL RATE: 78 BPM
BASOPHILS # BLD: 0 K/UL (ref 0–0.1)
BASOPHILS # BLD: 0.1 K/UL (ref 0–0.1)
BASOPHILS NFR BLD: 0 % (ref 0–2)
BASOPHILS NFR BLD: 1 % (ref 0–2)
BILIRUB SERPL-MCNC: 0.5 MG/DL (ref 0.2–1)
BUN SERPL-MCNC: 18 MG/DL (ref 7–18)
BUN SERPL-MCNC: 18 MG/DL (ref 7–18)
BUN/CREAT SERPL: 16 (ref 12–20)
BUN/CREAT SERPL: 16 (ref 12–20)
CALCIUM SERPL-MCNC: 9.2 MG/DL (ref 8.5–10.1)
CALCIUM SERPL-MCNC: 9.2 MG/DL (ref 8.5–10.1)
CALCULATED R AXIS, ECG10: 19 DEGREES
CALCULATED T AXIS, ECG11: -66 DEGREES
CHLORIDE SERPL-SCNC: 109 MMOL/L (ref 100–111)
CHLORIDE SERPL-SCNC: 109 MMOL/L (ref 100–111)
CHOLEST SERPL-MCNC: 83 MG/DL
CO2 SERPL-SCNC: 25 MMOL/L (ref 21–32)
CO2 SERPL-SCNC: 25 MMOL/L (ref 21–32)
CREAT SERPL-MCNC: 1.1 MG/DL (ref 0.6–1.3)
CREAT SERPL-MCNC: 1.1 MG/DL (ref 0.6–1.3)
CREAT UR-MCNC: 1 MG/DL (ref 0.6–1.3)
DIAGNOSIS, 93000: NORMAL
DIFFERENTIAL METHOD BLD: ABNORMAL
DIFFERENTIAL METHOD BLD: ABNORMAL
EOSINOPHIL # BLD: 0.1 K/UL (ref 0–0.4)
EOSINOPHIL # BLD: 0.1 K/UL (ref 0–0.4)
EOSINOPHIL NFR BLD: 1 % (ref 0–5)
EOSINOPHIL NFR BLD: 1 % (ref 0–5)
ERYTHROCYTE [DISTWIDTH] IN BLOOD BY AUTOMATED COUNT: 15.7 % (ref 11.6–14.5)
ERYTHROCYTE [DISTWIDTH] IN BLOOD BY AUTOMATED COUNT: 15.7 % (ref 11.6–14.5)
GLOBULIN SER CALC-MCNC: 4.3 G/DL (ref 2–4)
GLUCOSE SERPL-MCNC: 97 MG/DL (ref 74–99)
GLUCOSE SERPL-MCNC: 97 MG/DL (ref 74–99)
HCT VFR BLD AUTO: 41.6 % (ref 36–48)
HCT VFR BLD AUTO: 43.2 % (ref 36–48)
HDLC SERPL-MCNC: 22 MG/DL (ref 40–60)
HDLC SERPL: 3.8 {RATIO} (ref 0–5)
HGB BLD-MCNC: 13.9 G/DL (ref 13–16)
HGB BLD-MCNC: 13.9 G/DL (ref 13–16)
IMM GRANULOCYTES # BLD AUTO: 0 K/UL (ref 0–0.04)
IMM GRANULOCYTES # BLD AUTO: 0 K/UL (ref 0–0.04)
IMM GRANULOCYTES NFR BLD AUTO: 0 % (ref 0–0.5)
IMM GRANULOCYTES NFR BLD AUTO: 0 % (ref 0–0.5)
INR PPP: 1 (ref 0.8–1.2)
LDLC SERPL CALC-MCNC: 43.2 MG/DL (ref 0–100)
LIPID PROFILE,FLP: ABNORMAL
LYMPHOCYTES # BLD: 1.9 K/UL (ref 0.9–3.6)
LYMPHOCYTES # BLD: 1.9 K/UL (ref 0.9–3.6)
LYMPHOCYTES NFR BLD: 21 % (ref 21–52)
LYMPHOCYTES NFR BLD: 21 % (ref 21–52)
MCH RBC QN AUTO: 28.7 PG (ref 24–34)
MCH RBC QN AUTO: 29.8 PG (ref 24–34)
MCHC RBC AUTO-ENTMCNC: 32.2 G/DL (ref 31–37)
MCHC RBC AUTO-ENTMCNC: 33.4 G/DL (ref 31–37)
MCV RBC AUTO: 89.1 FL (ref 78–100)
MCV RBC AUTO: 89.3 FL (ref 78–100)
MONOCYTES # BLD: 0.7 K/UL (ref 0.05–1.2)
MONOCYTES # BLD: 0.7 K/UL (ref 0.05–1.2)
MONOCYTES NFR BLD: 7 % (ref 3–10)
MONOCYTES NFR BLD: 8 % (ref 3–10)
NEUTS SEG # BLD: 6.4 K/UL (ref 1.8–8)
NEUTS SEG # BLD: 6.5 K/UL (ref 1.8–8)
NEUTS SEG NFR BLD: 70 % (ref 40–73)
NEUTS SEG NFR BLD: 70 % (ref 40–73)
NRBC # BLD: 0 K/UL (ref 0–0.01)
NRBC # BLD: 0 K/UL (ref 0–0.01)
NRBC BLD-RTO: 0 PER 100 WBC
NRBC BLD-RTO: 0 PER 100 WBC
PLATELET # BLD AUTO: 428 K/UL (ref 135–420)
PLATELET # BLD AUTO: 430 K/UL (ref 135–420)
PMV BLD AUTO: 9.5 FL (ref 9.2–11.8)
PMV BLD AUTO: 9.6 FL (ref 9.2–11.8)
POTASSIUM SERPL-SCNC: 5.6 MMOL/L (ref 3.5–5.5)
POTASSIUM SERPL-SCNC: 5.6 MMOL/L (ref 3.5–5.5)
PROT SERPL-MCNC: 8 G/DL (ref 6.4–8.2)
PROTHROMBIN TIME: 13.3 SEC (ref 11.5–15.2)
Q-T INTERVAL, ECG07: 310 MS
QRS DURATION, ECG06: 88 MS
QTC CALCULATION (BEZET), ECG08: 415 MS
RBC # BLD AUTO: 4.66 M/UL (ref 4.35–5.65)
RBC # BLD AUTO: 4.85 M/UL (ref 4.35–5.65)
SODIUM SERPL-SCNC: 138 MMOL/L (ref 136–145)
SODIUM SERPL-SCNC: 138 MMOL/L (ref 136–145)
TRIGL SERPL-MCNC: 89 MG/DL (ref ?–150)
VENTRICULAR RATE, ECG03: 108 BPM
VLDLC SERPL CALC-MCNC: 17.8 MG/DL
WBC # BLD AUTO: 9.1 K/UL (ref 4.6–13.2)
WBC # BLD AUTO: 9.3 K/UL (ref 4.6–13.2)

## 2021-12-15 PROCEDURE — 82565 ASSAY OF CREATININE: CPT

## 2021-12-15 PROCEDURE — 71275 CT ANGIOGRAPHY CHEST: CPT

## 2021-12-15 PROCEDURE — 36415 COLL VENOUS BLD VENIPUNCTURE: CPT

## 2021-12-15 PROCEDURE — 71046 X-RAY EXAM CHEST 2 VIEWS: CPT

## 2021-12-15 PROCEDURE — 80061 LIPID PANEL: CPT

## 2021-12-15 PROCEDURE — 85025 COMPLETE CBC W/AUTO DIFF WBC: CPT

## 2021-12-15 PROCEDURE — 85610 PROTHROMBIN TIME: CPT

## 2021-12-15 PROCEDURE — 82306 VITAMIN D 25 HYDROXY: CPT

## 2021-12-15 PROCEDURE — 74011000636 HC RX REV CODE- 636: Performed by: SURGERY

## 2021-12-15 PROCEDURE — 80053 COMPREHEN METABOLIC PANEL: CPT

## 2021-12-15 PROCEDURE — 93005 ELECTROCARDIOGRAM TRACING: CPT

## 2021-12-15 RX ADMIN — IOPAMIDOL 80 ML: 755 INJECTION, SOLUTION INTRAVENOUS at 08:18

## 2021-12-16 ENCOUNTER — HOSPITAL ENCOUNTER (INPATIENT)
Age: 61
LOS: 3 days | Discharge: HOME | DRG: 271 | End: 2021-12-19
Attending: SURGERY | Admitting: SURGERY
Payer: COMMERCIAL

## 2021-12-16 ENCOUNTER — ANESTHESIA (OUTPATIENT)
Dept: CARDIOTHORACIC SURGERY | Age: 61
DRG: 271 | End: 2021-12-16
Payer: COMMERCIAL

## 2021-12-16 ENCOUNTER — APPOINTMENT (OUTPATIENT)
Dept: GENERAL RADIOLOGY | Age: 61
DRG: 271 | End: 2021-12-16
Attending: SURGERY
Payer: COMMERCIAL

## 2021-12-16 DIAGNOSIS — I70.201 OCCLUSION OF RIGHT FEMORAL ARTERY (HCC): Primary | ICD-10-CM

## 2021-12-16 LAB
ABO + RH BLD: NORMAL
AMPHET UR QL SCN: NEGATIVE
BARBITURATES UR QL SCN: NEGATIVE
BENZODIAZ UR QL: NEGATIVE
BLOOD GROUP ANTIBODIES SERPL: NORMAL
CANNABINOIDS UR QL SCN: POSITIVE
COCAINE UR QL SCN: NEGATIVE
HDSCOM,HDSCOM: ABNORMAL
METHADONE UR QL: NEGATIVE
OPIATES UR QL: NEGATIVE
PCP UR QL: NEGATIVE
POTASSIUM SERPL-SCNC: 4.9 MMOL/L (ref 3.5–5.5)
SPECIMEN EXP DATE BLD: NORMAL

## 2021-12-16 PROCEDURE — C1894 INTRO/SHEATH, NON-LASER: HCPCS | Performed by: SURGERY

## 2021-12-16 PROCEDURE — 74011000250 HC RX REV CODE- 250: Performed by: SURGERY

## 2021-12-16 PROCEDURE — 74011250637 HC RX REV CODE- 250/637: Performed by: NURSE ANESTHETIST, CERTIFIED REGISTERED

## 2021-12-16 PROCEDURE — 04CK3ZZ EXTIRPATION OF MATTER FROM RIGHT FEMORAL ARTERY, PERCUTANEOUS APPROACH: ICD-10-PCS | Performed by: SURGERY

## 2021-12-16 PROCEDURE — 36620 INSERTION CATHETER ARTERY: CPT | Performed by: STUDENT IN AN ORGANIZED HEALTH CARE EDUCATION/TRAINING PROGRAM

## 2021-12-16 PROCEDURE — 74011250636 HC RX REV CODE- 250/636: Performed by: SURGERY

## 2021-12-16 PROCEDURE — 77030002933 HC SUT MCRYL J&J -A: Performed by: SURGERY

## 2021-12-16 PROCEDURE — 77030002986 HC SUT PROL J&J -A: Performed by: SURGERY

## 2021-12-16 PROCEDURE — 77030002996 HC SUT SLK J&J -A: Performed by: SURGERY

## 2021-12-16 PROCEDURE — 76010000108 HC CV SURG 2 TO 2.5 HR: Performed by: SURGERY

## 2021-12-16 PROCEDURE — 77030002987 HC SUT PROL J&J -B: Performed by: SURGERY

## 2021-12-16 PROCEDURE — 77030040830 HC CATH URETH FOL MDII -A: Performed by: SURGERY

## 2021-12-16 PROCEDURE — 01270 ANES PX ARTERIES UPR LEG NOS: CPT | Performed by: STUDENT IN AN ORGANIZED HEALTH CARE EDUCATION/TRAINING PROGRAM

## 2021-12-16 PROCEDURE — C1769 GUIDE WIRE: HCPCS | Performed by: SURGERY

## 2021-12-16 PROCEDURE — C1725 CATH, TRANSLUMIN NON-LASER: HCPCS | Performed by: SURGERY

## 2021-12-16 PROCEDURE — 77030040922 HC BLNKT HYPOTHRM STRY -A: Performed by: SURGERY

## 2021-12-16 PROCEDURE — 74011250637 HC RX REV CODE- 250/637: Performed by: SURGERY

## 2021-12-16 PROCEDURE — 74011000250 HC RX REV CODE- 250: Performed by: NURSE ANESTHETIST, CERTIFIED REGISTERED

## 2021-12-16 PROCEDURE — 77030010507 HC ADH SKN DERMBND J&J -B: Performed by: SURGERY

## 2021-12-16 PROCEDURE — 74011250636 HC RX REV CODE- 250/636: Performed by: NURSE ANESTHETIST, CERTIFIED REGISTERED

## 2021-12-16 PROCEDURE — C1757 CATH, THROMBECTOMY/EMBOLECT: HCPCS | Performed by: SURGERY

## 2021-12-16 PROCEDURE — 77030038692 HC WND DEB SYS IRMX -B: Performed by: SURGERY

## 2021-12-16 PROCEDURE — 65620000000 HC RM CCU GENERAL

## 2021-12-16 PROCEDURE — 86901 BLOOD TYPING SEROLOGIC RH(D): CPT

## 2021-12-16 PROCEDURE — 01270 ANES PX ARTERIES UPR LEG NOS: CPT | Performed by: NURSE ANESTHETIST, CERTIFIED REGISTERED

## 2021-12-16 PROCEDURE — 80307 DRUG TEST PRSMV CHEM ANLYZR: CPT

## 2021-12-16 PROCEDURE — 77030010512 HC APPL CLP LIG J&J -C: Performed by: SURGERY

## 2021-12-16 PROCEDURE — 77030008584 HC TOOL GDWRE DEV TERU -A: Performed by: SURGERY

## 2021-12-16 PROCEDURE — 77030011265 HC ELECTRD BLD HEX COVD -A: Performed by: SURGERY

## 2021-12-16 PROCEDURE — 77030018836 HC SOL IRR NACL ICUM -A: Performed by: SURGERY

## 2021-12-16 PROCEDURE — 37248 TRLUML BALO ANGIOP 1ST VEIN: CPT

## 2021-12-16 PROCEDURE — 84132 ASSAY OF SERUM POTASSIUM: CPT

## 2021-12-16 PROCEDURE — 047M3ZZ DILATION OF RIGHT POPLITEAL ARTERY, PERCUTANEOUS APPROACH: ICD-10-PCS | Performed by: SURGERY

## 2021-12-16 PROCEDURE — 77030025869: Performed by: SURGERY

## 2021-12-16 PROCEDURE — 74011000636 HC RX REV CODE- 636: Performed by: SURGERY

## 2021-12-16 PROCEDURE — 77030003390 HC NDL ANGI MRTM -A: Performed by: SURGERY

## 2021-12-16 PROCEDURE — 2709999900 HC NON-CHARGEABLE SUPPLY: Performed by: SURGERY

## 2021-12-16 PROCEDURE — 76060000035 HC ANESTHESIA 2 TO 2.5 HR: Performed by: SURGERY

## 2021-12-16 RX ORDER — ONDANSETRON 2 MG/ML
4 INJECTION INTRAMUSCULAR; INTRAVENOUS ONCE
Status: CANCELLED | OUTPATIENT
Start: 2021-12-16 | End: 2021-12-16

## 2021-12-16 RX ORDER — DILTIAZEM HYDROCHLORIDE 180 MG/1
180 CAPSULE, COATED, EXTENDED RELEASE ORAL DAILY
Status: DISCONTINUED | OUTPATIENT
Start: 2021-12-17 | End: 2021-12-19 | Stop reason: HOSPADM

## 2021-12-16 RX ORDER — MAGNESIUM SULFATE 100 %
4 CRYSTALS MISCELLANEOUS AS NEEDED
Status: CANCELLED | OUTPATIENT
Start: 2021-12-16

## 2021-12-16 RX ORDER — FENTANYL CITRATE 50 UG/ML
INJECTION, SOLUTION INTRAMUSCULAR; INTRAVENOUS AS NEEDED
Status: DISCONTINUED | OUTPATIENT
Start: 2021-12-16 | End: 2021-12-16 | Stop reason: HOSPADM

## 2021-12-16 RX ORDER — SODIUM CHLORIDE 0.9 % (FLUSH) 0.9 %
5-40 SYRINGE (ML) INJECTION EVERY 8 HOURS
Status: DISCONTINUED | OUTPATIENT
Start: 2021-12-16 | End: 2021-12-19 | Stop reason: HOSPADM

## 2021-12-16 RX ORDER — CHLORHEXIDINE GLUCONATE 4 G/100ML
SOLUTION TOPICAL
Status: DISPENSED
Start: 2021-12-16 | End: 2021-12-17

## 2021-12-16 RX ORDER — LIDOCAINE HYDROCHLORIDE 10 MG/ML
INJECTION, SOLUTION EPIDURAL; INFILTRATION; INTRACAUDAL; PERINEURAL AS NEEDED
Status: DISCONTINUED | OUTPATIENT
Start: 2021-12-16 | End: 2021-12-16 | Stop reason: HOSPADM

## 2021-12-16 RX ORDER — GUAIFENESIN 100 MG/5ML
81 LIQUID (ML) ORAL DAILY
Status: DISCONTINUED | OUTPATIENT
Start: 2021-12-17 | End: 2021-12-19 | Stop reason: HOSPADM

## 2021-12-16 RX ORDER — DEXTROSE MONOHYDRATE AND SODIUM CHLORIDE 5; .45 G/100ML; G/100ML
75 INJECTION, SOLUTION INTRAVENOUS CONTINUOUS
Status: DISPENSED | OUTPATIENT
Start: 2021-12-16 | End: 2021-12-17

## 2021-12-16 RX ORDER — METOPROLOL TARTRATE 50 MG/1
50 TABLET ORAL 2 TIMES DAILY
Status: DISCONTINUED | OUTPATIENT
Start: 2021-12-16 | End: 2021-12-19 | Stop reason: HOSPADM

## 2021-12-16 RX ORDER — SODIUM CHLORIDE 0.9 % (FLUSH) 0.9 %
5-40 SYRINGE (ML) INJECTION AS NEEDED
Status: DISCONTINUED | OUTPATIENT
Start: 2021-12-16 | End: 2021-12-16 | Stop reason: HOSPADM

## 2021-12-16 RX ORDER — MIDAZOLAM HYDROCHLORIDE 1 MG/ML
INJECTION, SOLUTION INTRAMUSCULAR; INTRAVENOUS AS NEEDED
Status: DISCONTINUED | OUTPATIENT
Start: 2021-12-16 | End: 2021-12-16 | Stop reason: HOSPADM

## 2021-12-16 RX ORDER — GABAPENTIN 400 MG/1
400 CAPSULE ORAL 3 TIMES DAILY
Status: DISCONTINUED | OUTPATIENT
Start: 2021-12-16 | End: 2021-12-19 | Stop reason: HOSPADM

## 2021-12-16 RX ORDER — HEPARIN SODIUM 200 [USP'U]/100ML
INJECTION, SOLUTION INTRAVENOUS
Status: COMPLETED | OUTPATIENT
Start: 2021-12-16 | End: 2021-12-16

## 2021-12-16 RX ORDER — SODIUM CHLORIDE 0.9 % (FLUSH) 0.9 %
5-40 SYRINGE (ML) INJECTION EVERY 8 HOURS
Status: DISCONTINUED | OUTPATIENT
Start: 2021-12-16 | End: 2021-12-16 | Stop reason: HOSPADM

## 2021-12-16 RX ORDER — FUROSEMIDE 20 MG/1
20 TABLET ORAL DAILY
Status: DISCONTINUED | OUTPATIENT
Start: 2021-12-17 | End: 2021-12-19 | Stop reason: HOSPADM

## 2021-12-16 RX ORDER — IODIXANOL 320 MG/ML
INJECTION, SOLUTION INTRAVASCULAR AS NEEDED
Status: DISCONTINUED | OUTPATIENT
Start: 2021-12-16 | End: 2021-12-16 | Stop reason: HOSPADM

## 2021-12-16 RX ORDER — LIDOCAINE HYDROCHLORIDE 10 MG/ML
0.1 INJECTION, SOLUTION EPIDURAL; INFILTRATION; INTRACAUDAL; PERINEURAL AS NEEDED
Status: DISCONTINUED | OUTPATIENT
Start: 2021-12-16 | End: 2021-12-16 | Stop reason: HOSPADM

## 2021-12-16 RX ORDER — DILTIAZEM HYDROCHLORIDE 120 MG/1
120 CAPSULE, COATED, EXTENDED RELEASE ORAL
Status: DISCONTINUED | OUTPATIENT
Start: 2021-12-16 | End: 2021-12-19 | Stop reason: HOSPADM

## 2021-12-16 RX ORDER — ROCURONIUM BROMIDE 10 MG/ML
INJECTION, SOLUTION INTRAVENOUS AS NEEDED
Status: DISCONTINUED | OUTPATIENT
Start: 2021-12-16 | End: 2021-12-16 | Stop reason: HOSPADM

## 2021-12-16 RX ORDER — SUCCINYLCHOLINE CHLORIDE 20 MG/ML
INJECTION INTRAMUSCULAR; INTRAVENOUS AS NEEDED
Status: DISCONTINUED | OUTPATIENT
Start: 2021-12-16 | End: 2021-12-16 | Stop reason: HOSPADM

## 2021-12-16 RX ORDER — DEXAMETHASONE SODIUM PHOSPHATE 4 MG/ML
INJECTION, SOLUTION INTRA-ARTICULAR; INTRALESIONAL; INTRAMUSCULAR; INTRAVENOUS; SOFT TISSUE AS NEEDED
Status: DISCONTINUED | OUTPATIENT
Start: 2021-12-16 | End: 2021-12-16 | Stop reason: HOSPADM

## 2021-12-16 RX ORDER — IODIXANOL 320 MG/ML
INJECTION, SOLUTION INTRAVASCULAR
Status: DISCONTINUED
Start: 2021-12-16 | End: 2021-12-16 | Stop reason: WASHOUT

## 2021-12-16 RX ORDER — HEPARIN SODIUM 1000 [USP'U]/ML
INJECTION, SOLUTION INTRAVENOUS; SUBCUTANEOUS AS NEEDED
Status: DISCONTINUED | OUTPATIENT
Start: 2021-12-16 | End: 2021-12-16 | Stop reason: HOSPADM

## 2021-12-16 RX ORDER — SODIUM CHLORIDE 0.9 % (FLUSH) 0.9 %
5-40 SYRINGE (ML) INJECTION AS NEEDED
Status: CANCELLED | OUTPATIENT
Start: 2021-12-16

## 2021-12-16 RX ORDER — SODIUM CHLORIDE 0.9 % (FLUSH) 0.9 %
5-40 SYRINGE (ML) INJECTION EVERY 8 HOURS
Status: CANCELLED | OUTPATIENT
Start: 2021-12-16

## 2021-12-16 RX ORDER — VASOPRESSIN 20 U/ML
INJECTION PARENTERAL AS NEEDED
Status: DISCONTINUED | OUTPATIENT
Start: 2021-12-16 | End: 2021-12-16 | Stop reason: HOSPADM

## 2021-12-16 RX ORDER — ONDANSETRON 2 MG/ML
INJECTION INTRAMUSCULAR; INTRAVENOUS AS NEEDED
Status: DISCONTINUED | OUTPATIENT
Start: 2021-12-16 | End: 2021-12-16 | Stop reason: HOSPADM

## 2021-12-16 RX ORDER — INSULIN LISPRO 100 [IU]/ML
INJECTION, SOLUTION INTRAVENOUS; SUBCUTANEOUS ONCE
Status: CANCELLED | OUTPATIENT
Start: 2021-12-16 | End: 2021-12-17

## 2021-12-16 RX ORDER — PHENYLEPHRINE HCL IN 0.9% NACL 1 MG/10 ML
SYRINGE (ML) INTRAVENOUS AS NEEDED
Status: DISCONTINUED | OUTPATIENT
Start: 2021-12-16 | End: 2021-12-16 | Stop reason: HOSPADM

## 2021-12-16 RX ORDER — SODIUM CHLORIDE, SODIUM LACTATE, POTASSIUM CHLORIDE, CALCIUM CHLORIDE 600; 310; 30; 20 MG/100ML; MG/100ML; MG/100ML; MG/100ML
75 INJECTION, SOLUTION INTRAVENOUS CONTINUOUS
Status: CANCELLED | OUTPATIENT
Start: 2021-12-16

## 2021-12-16 RX ORDER — PROPOFOL 10 MG/ML
INJECTION, EMULSION INTRAVENOUS AS NEEDED
Status: DISCONTINUED | OUTPATIENT
Start: 2021-12-16 | End: 2021-12-16 | Stop reason: HOSPADM

## 2021-12-16 RX ORDER — HYDROCODONE BITARTRATE AND ACETAMINOPHEN 5; 325 MG/1; MG/1
1 TABLET ORAL
Status: DISCONTINUED | OUTPATIENT
Start: 2021-12-16 | End: 2021-12-19 | Stop reason: HOSPADM

## 2021-12-16 RX ORDER — INSULIN LISPRO 100 [IU]/ML
INJECTION, SOLUTION INTRAVENOUS; SUBCUTANEOUS ONCE
Status: DISCONTINUED | OUTPATIENT
Start: 2021-12-16 | End: 2021-12-16 | Stop reason: HOSPADM

## 2021-12-16 RX ORDER — FENTANYL CITRATE 50 UG/ML
25 INJECTION, SOLUTION INTRAMUSCULAR; INTRAVENOUS AS NEEDED
Status: CANCELLED | OUTPATIENT
Start: 2021-12-16

## 2021-12-16 RX ORDER — ATORVASTATIN CALCIUM 40 MG/1
80 TABLET, FILM COATED ORAL
Status: DISCONTINUED | OUTPATIENT
Start: 2021-12-16 | End: 2021-12-19 | Stop reason: HOSPADM

## 2021-12-16 RX ORDER — LIDOCAINE HYDROCHLORIDE 20 MG/ML
INJECTION, SOLUTION EPIDURAL; INFILTRATION; INTRACAUDAL; PERINEURAL AS NEEDED
Status: DISCONTINUED | OUTPATIENT
Start: 2021-12-16 | End: 2021-12-16 | Stop reason: HOSPADM

## 2021-12-16 RX ORDER — SODIUM CHLORIDE, SODIUM LACTATE, POTASSIUM CHLORIDE, CALCIUM CHLORIDE 600; 310; 30; 20 MG/100ML; MG/100ML; MG/100ML; MG/100ML
25 INJECTION, SOLUTION INTRAVENOUS CONTINUOUS
Status: DISCONTINUED | OUTPATIENT
Start: 2021-12-16 | End: 2021-12-16 | Stop reason: HOSPADM

## 2021-12-16 RX ORDER — FAMOTIDINE 20 MG/1
20 TABLET, FILM COATED ORAL ONCE
Status: COMPLETED | OUTPATIENT
Start: 2021-12-16 | End: 2021-12-16

## 2021-12-16 RX ORDER — HEPARIN SODIUM 200 [USP'U]/100ML
INJECTION, SOLUTION INTRAVENOUS
Status: DISPENSED
Start: 2021-12-16 | End: 2021-12-17

## 2021-12-16 RX ORDER — IODIXANOL 320 MG/ML
INJECTION, SOLUTION INTRAVASCULAR
Status: DISPENSED
Start: 2021-12-16 | End: 2021-12-17

## 2021-12-16 RX ORDER — LIDOCAINE HYDROCHLORIDE 10 MG/ML
INJECTION, SOLUTION EPIDURAL; INFILTRATION; INTRACAUDAL; PERINEURAL
Status: DISPENSED
Start: 2021-12-16 | End: 2021-12-17

## 2021-12-16 RX ORDER — SODIUM CHLORIDE 0.9 % (FLUSH) 0.9 %
5-40 SYRINGE (ML) INJECTION AS NEEDED
Status: DISCONTINUED | OUTPATIENT
Start: 2021-12-16 | End: 2021-12-19 | Stop reason: HOSPADM

## 2021-12-16 RX ORDER — LOSARTAN POTASSIUM 50 MG/1
100 TABLET ORAL DAILY
Status: DISCONTINUED | OUTPATIENT
Start: 2021-12-17 | End: 2021-12-19 | Stop reason: HOSPADM

## 2021-12-16 RX ORDER — DIPHENHYDRAMINE HYDROCHLORIDE 50 MG/ML
12.5 INJECTION, SOLUTION INTRAMUSCULAR; INTRAVENOUS
Status: CANCELLED | OUTPATIENT
Start: 2021-12-16

## 2021-12-16 RX ORDER — HYDROMORPHONE HYDROCHLORIDE 1 MG/ML
0.5 INJECTION, SOLUTION INTRAMUSCULAR; INTRAVENOUS; SUBCUTANEOUS
Status: DISCONTINUED | OUTPATIENT
Start: 2021-12-16 | End: 2021-12-19 | Stop reason: HOSPADM

## 2021-12-16 RX ORDER — DEXTROSE 50 % IN WATER (D50W) INTRAVENOUS SYRINGE
25-50 AS NEEDED
Status: CANCELLED | OUTPATIENT
Start: 2021-12-16

## 2021-12-16 RX ADMIN — Medication 10 ML: at 21:13

## 2021-12-16 RX ADMIN — WATER 2 G: 1 INJECTION INTRAMUSCULAR; INTRAVENOUS; SUBCUTANEOUS at 13:43

## 2021-12-16 RX ADMIN — ROCURONIUM BROMIDE 10 MG: 50 INJECTION INTRAVENOUS at 13:47

## 2021-12-16 RX ADMIN — SUCCINYLCHOLINE CHLORIDE 100 MG: 20 INJECTION, SOLUTION INTRAMUSCULAR; INTRAVENOUS at 13:47

## 2021-12-16 RX ADMIN — Medication 100 MCG: at 14:19

## 2021-12-16 RX ADMIN — Medication 100 MCG: at 14:34

## 2021-12-16 RX ADMIN — DEXTROSE MONOHYDRATE AND SODIUM CHLORIDE 75 ML/HR: 5; .45 INJECTION, SOLUTION INTRAVENOUS at 17:14

## 2021-12-16 RX ADMIN — METOPROLOL TARTRATE 50 MG: 50 TABLET, FILM COATED ORAL at 17:09

## 2021-12-16 RX ADMIN — DEXAMETHASONE SODIUM PHOSPHATE 4 MG: 4 INJECTION, SOLUTION INTRAMUSCULAR; INTRAVENOUS at 13:47

## 2021-12-16 RX ADMIN — SODIUM CHLORIDE, SODIUM LACTATE, POTASSIUM CHLORIDE, AND CALCIUM CHLORIDE: 600; 310; 30; 20 INJECTION, SOLUTION INTRAVENOUS at 15:10

## 2021-12-16 RX ADMIN — SODIUM CHLORIDE, SODIUM LACTATE, POTASSIUM CHLORIDE, AND CALCIUM CHLORIDE: 600; 310; 30; 20 INJECTION, SOLUTION INTRAVENOUS at 13:43

## 2021-12-16 RX ADMIN — ONDANSETRON 4 MG: 2 INJECTION INTRAMUSCULAR; INTRAVENOUS at 13:47

## 2021-12-16 RX ADMIN — VASOPRESSIN 2 UNITS: 20 INJECTION INTRAVENOUS at 14:52

## 2021-12-16 RX ADMIN — WATER 2 G: 1 INJECTION INTRAMUSCULAR; INTRAVENOUS; SUBCUTANEOUS at 16:56

## 2021-12-16 RX ADMIN — Medication 100 MCG: at 14:26

## 2021-12-16 RX ADMIN — VASOPRESSIN 2 UNITS: 20 INJECTION INTRAVENOUS at 14:36

## 2021-12-16 RX ADMIN — FENTANYL CITRATE 100 MCG: 50 INJECTION, SOLUTION INTRAMUSCULAR; INTRAVENOUS at 13:47

## 2021-12-16 RX ADMIN — PROPOFOL 100 MG: 10 INJECTION, EMULSION INTRAVENOUS at 13:47

## 2021-12-16 RX ADMIN — DILTIAZEM HYDROCHLORIDE 120 MG: 120 CAPSULE, COATED, EXTENDED RELEASE ORAL at 21:12

## 2021-12-16 RX ADMIN — FAMOTIDINE 20 MG: 20 TABLET ORAL at 12:13

## 2021-12-16 RX ADMIN — GABAPENTIN 400 MG: 400 CAPSULE ORAL at 17:09

## 2021-12-16 RX ADMIN — HEPARIN SODIUM 8000 UNITS: 1000 INJECTION, SOLUTION INTRAVENOUS; SUBCUTANEOUS at 14:39

## 2021-12-16 RX ADMIN — SUGAMMADEX 200 MG: 100 INJECTION, SOLUTION INTRAVENOUS at 15:36

## 2021-12-16 RX ADMIN — LIDOCAINE HYDROCHLORIDE 20 MG: 20 INJECTION, SOLUTION EPIDURAL; INFILTRATION; INTRACAUDAL; PERINEURAL at 13:47

## 2021-12-16 RX ADMIN — GABAPENTIN 400 MG: 400 CAPSULE ORAL at 21:12

## 2021-12-16 RX ADMIN — MIDAZOLAM HYDROCHLORIDE 2 MG: 2 INJECTION, SOLUTION INTRAMUSCULAR; INTRAVENOUS at 13:43

## 2021-12-16 RX ADMIN — ATORVASTATIN CALCIUM 80 MG: 40 TABLET, FILM COATED ORAL at 21:12

## 2021-12-16 RX ADMIN — HYDROMORPHONE HYDROCHLORIDE 0.5 MG: 1 INJECTION, SOLUTION INTRAMUSCULAR; INTRAVENOUS; SUBCUTANEOUS at 16:56

## 2021-12-16 RX ADMIN — HYDROCODONE BITARTRATE AND ACETAMINOPHEN 1 TABLET: 5; 325 TABLET ORAL at 21:12

## 2021-12-16 RX ADMIN — Medication 10 ML: at 16:56

## 2021-12-16 NOTE — OP NOTES
Preoperative diagnosis: Occlusion of right femoral artery bypass    Postoperative diagnosis: Same    Procedures performed:  #1  Thrombectomy right femoral artery bypass  #2  Right leg angiogram with interpretation  #3  Balloon angioplasty of popliteal artery right side      Cultures: None    Specimens: None    Drains: None    Estimated blood loss: Less than 50 mL    Assistants: None    Implants: Please see above    Complications: None    Anesthesia: General anesthesia. Indications for the procedure: Maciej Burnette is a 59 y.o. several weeks of pain right leg, Doppler shows bypass occlusion. Patient was given the appropriate risk and benefits of the procedure including but not limited to bleeding, infection, damage to adjacent structures, MI, stroke, death, loss of lower extremity, need for further surgery. Patient was understanding of all the risks and underwent a procedure. Operative findings:   #1  Thrombectomy of right leg bypass and angioplasty of popliteal artery. Trifurcation vessels are occluded chronically no thrombus is seen. Dorsalis pedis, posterior tibial, and peroneal present distally    Procedure:  Patient was correctly identified in the precath area and taken to the Cath Lab in stable condition. Patient had pre-incision timeout prior to any incision. Patient was prepped and draped in the normal sterile fashion according to CDC guidelines aseptic technique. Localized and incised the right groin. Expose the distal axillofemoral bypass graft the femorofemoral graft and the femoropopliteal graft. The femorofemoral graft was patent to the femoral artery. Heparinized the patient with 8000 of heparin. Clamped the graft and incised the graft at the hernández. Distal axillofemoral graft was patent. Thrombectomized the graft easily. Some backbleeding was achieved. Repaired the graft with 5-0 Prolene suture.   Inserted the long sheath into the graft performed a right leg angiogram.  This revealed a heavily diseased popliteal artery. Runoff showed proximal tibial vessels occluded. Later reconstitution of distal posterior tibial peroneal and posterior tibial small in size but patent and free of thrombus. Crossed the popliteal artery with a Glidewire and ballooned it first with a 4 100 balloon and then a 5 100 balloon both the full pressure for 3-minute hold times. Final angiogram shows a fully patent popliteal artery with enhanced collateralization below. Pulled the sheath and repaired the site with a 5-0 Prolene irrigated with antibiotics closed the fascia with interrupted 2-0 Monocryl. 3-0 Monocryl subcu for Monocryl and Dermabond glue for the skin a wound management system was applied. He was extubated transferred to recovery in stable condition.

## 2021-12-16 NOTE — ANESTHESIA PREPROCEDURE EVALUATION
Anesthetic History   No history of anesthetic complications            Review of Systems / Medical History  Patient summary reviewed, nursing notes reviewed and pertinent labs reviewed    Pulmonary  Within defined limits                 Neuro/Psych             Comments: 02/2021  The right ICA is occluded  The right vertebral is antegrade  There is mild stenosis in the left ICA (<50%) and at the proximal segment  The left vertebral is occluded.  Cardiovascular    Hypertension        Dysrhythmias (paroxysmal A fib) : atrial fibrillation  CAD, PAD, cardiac stents (2017) and hyperlipidemia      Comments: dilated cardiomyopathy    11/2020 stress  Left ventricular perfusion is probably normal. Myocardial perfusion imaging supports a low risk stress test    04/2021 ECHO  Estimated LVEF is 55 - 60%   GI/Hepatic/Renal  Within defined limits              Endo/Other        Arthritis     Other Findings   Comments: Marijuana    HX ABOVE KNEE AMPUTATION            Physical Exam    Airway  Mallampati: II  TM Distance: 4 - 6 cm  Neck ROM: normal range of motion   Mouth opening: Normal    Comments: Facial hair Cardiovascular    Rhythm: regular           Dental    Dentition: Full lower dentures and Full upper dentures     Pulmonary  Breath sounds clear to auscultation               Abdominal  GI exam deferred       Other Findings            Anesthetic Plan    ASA: 3  Anesthesia type: general            Anesthetic plan and risks discussed with: Patient

## 2021-12-16 NOTE — ANESTHESIA PROCEDURE NOTES
Arterial Line Placement    Start time: 12/16/2021 2:00 PM  End time: 12/16/2021 2:02 PM  Performed by: Nicki Byrne CRNA  Authorized by: Nicki Byrne CRNA     Pre-Procedure  Indications:  Blood sampling and arterial pressure monitoring  Preanesthetic Checklist: patient identified, risks and benefits discussed, anesthesia consent, site marked, patient being monitored, timeout performed and patient being monitored    Timeout Time: 14:00 EST        Procedure:   Prep:  Chlorhexidine  Seldinger Technique?: Yes    Orientation:  Right  Location:  Radial artery  Catheter size:  20 G  Number of attempts:  1  Cont Cardiac Output Sensor: No      Assessment:   Post-procedure:  Line secured and sterile dressing applied  Patient Tolerance:  Patient tolerated the procedure well with no immediate complications  Comment:   A line placed with sterile gown prep drape technique   ulttra sound guidance   No complications

## 2021-12-16 NOTE — ANESTHESIA POSTPROCEDURE EVALUATION
Procedure(s):  REVISION OF RIGHT LEG BYPASS / THROMBECTOMY / BALLOON ANGIOPLASTY. general    Anesthesia Post Evaluation      Multimodal analgesia: multimodal analgesia used between 6 hours prior to anesthesia start to PACU discharge  Patient location during evaluation: PACU  Patient participation: complete - patient participated  Level of consciousness: sleepy but conscious  Pain management: adequate  Airway patency: patent  Anesthetic complications: no  Cardiovascular status: acceptable  Respiratory status: acceptable  Hydration status: acceptable  Post anesthesia nausea and vomiting:  controlled  Final Post Anesthesia Temperature Assessment:  Normothermia (36.0-37.5 degrees C)      INITIAL Post-op Vital signs: No vitals data found for the desired time range.

## 2021-12-16 NOTE — ROUTINE PROCESS
TRANSFER - OUT REPORT:    Verbal report given to Rashmi Browning RN on Coit Dolin  being transferred to CVT ICU(unit) for routine post - op       Report consisted of patients Situation, Background, Assessment and   Recommendations(SBAR). Information from the following report(s) SBAR, Kardex, OR Summary and Procedure Summary was reviewed with the receiving nurse. Lines:   Peripheral IV 12/16/21 Anterior; Left Wrist (Active)   Site Assessment Clean, dry, & intact 12/16/21 1416   Phlebitis Assessment 0 12/16/21 1416   Infiltration Assessment 0 12/16/21 1214   Dressing Status Clean, dry, & intact 12/16/21 1416   Dressing Type Transparent 12/16/21 1416   Hub Color/Line Status Blue 12/16/21 1416   Alcohol Cap Used No 12/16/21 1214       Peripheral IV 12/16/21 Right Arm (Active)       Arterial Line 12/16/21 Right Radial artery (Active)        Opportunity for questions and clarification was provided.       Patient transported with:   Monitor  Registered Nurse

## 2021-12-17 LAB
ANION GAP SERPL CALC-SCNC: 5 MMOL/L (ref 3–18)
BASOPHILS # BLD: 0 K/UL (ref 0–0.1)
BASOPHILS NFR BLD: 0 % (ref 0–2)
BUN SERPL-MCNC: 11 MG/DL (ref 7–18)
BUN/CREAT SERPL: 12 (ref 12–20)
CALCIUM SERPL-MCNC: 8.7 MG/DL (ref 8.5–10.1)
CHLORIDE SERPL-SCNC: 110 MMOL/L (ref 100–111)
CO2 SERPL-SCNC: 25 MMOL/L (ref 21–32)
CREAT SERPL-MCNC: 0.89 MG/DL (ref 0.6–1.3)
DIFFERENTIAL METHOD BLD: ABNORMAL
EOSINOPHIL # BLD: 0 K/UL (ref 0–0.4)
EOSINOPHIL NFR BLD: 0 % (ref 0–5)
ERYTHROCYTE [DISTWIDTH] IN BLOOD BY AUTOMATED COUNT: 15.8 % (ref 11.6–14.5)
GLUCOSE SERPL-MCNC: 130 MG/DL (ref 74–99)
HCT VFR BLD AUTO: 39.9 % (ref 36–48)
HGB BLD-MCNC: 12.9 G/DL (ref 13–16)
IMM GRANULOCYTES # BLD AUTO: 0.1 K/UL (ref 0–0.04)
IMM GRANULOCYTES NFR BLD AUTO: 1 % (ref 0–0.5)
LYMPHOCYTES # BLD: 1 K/UL (ref 0.9–3.6)
LYMPHOCYTES NFR BLD: 7 % (ref 21–52)
MCH RBC QN AUTO: 28.9 PG (ref 24–34)
MCHC RBC AUTO-ENTMCNC: 32.3 G/DL (ref 31–37)
MCV RBC AUTO: 89.5 FL (ref 78–100)
MONOCYTES # BLD: 0.6 K/UL (ref 0.05–1.2)
MONOCYTES NFR BLD: 5 % (ref 3–10)
NEUTS SEG # BLD: 12.3 K/UL (ref 1.8–8)
NEUTS SEG NFR BLD: 88 % (ref 40–73)
NRBC # BLD: 0 K/UL (ref 0–0.01)
NRBC BLD-RTO: 0 PER 100 WBC
PLATELET # BLD AUTO: 296 K/UL (ref 135–420)
PMV BLD AUTO: 8.8 FL (ref 9.2–11.8)
POTASSIUM SERPL-SCNC: 4 MMOL/L (ref 3.5–5.5)
RBC # BLD AUTO: 4.46 M/UL (ref 4.35–5.65)
SODIUM SERPL-SCNC: 140 MMOL/L (ref 136–145)
WBC # BLD AUTO: 14 K/UL (ref 4.6–13.2)

## 2021-12-17 PROCEDURE — 85025 COMPLETE CBC W/AUTO DIFF WBC: CPT

## 2021-12-17 PROCEDURE — 74011250637 HC RX REV CODE- 250/637: Performed by: SURGERY

## 2021-12-17 PROCEDURE — 74011250636 HC RX REV CODE- 250/636: Performed by: SURGERY

## 2021-12-17 PROCEDURE — 65620000000 HC RM CCU GENERAL

## 2021-12-17 PROCEDURE — 94762 N-INVAS EAR/PLS OXIMTRY CONT: CPT

## 2021-12-17 PROCEDURE — 77010033678 HC OXYGEN DAILY

## 2021-12-17 PROCEDURE — 36415 COLL VENOUS BLD VENIPUNCTURE: CPT

## 2021-12-17 PROCEDURE — 74011000250 HC RX REV CODE- 250: Performed by: SURGERY

## 2021-12-17 PROCEDURE — 80048 BASIC METABOLIC PNL TOTAL CA: CPT

## 2021-12-17 RX ADMIN — RIVAROXABAN 20 MG: 20 TABLET, FILM COATED ORAL at 17:38

## 2021-12-17 RX ADMIN — HYDROCODONE BITARTRATE AND ACETAMINOPHEN 1 TABLET: 5; 325 TABLET ORAL at 08:21

## 2021-12-17 RX ADMIN — Medication 10 ML: at 05:58

## 2021-12-17 RX ADMIN — GABAPENTIN 400 MG: 400 CAPSULE ORAL at 16:37

## 2021-12-17 RX ADMIN — WATER 2 G: 1 INJECTION INTRAMUSCULAR; INTRAVENOUS; SUBCUTANEOUS at 00:25

## 2021-12-17 RX ADMIN — GABAPENTIN 400 MG: 400 CAPSULE ORAL at 08:21

## 2021-12-17 RX ADMIN — HYDROCODONE BITARTRATE AND ACETAMINOPHEN 1 TABLET: 5; 325 TABLET ORAL at 17:40

## 2021-12-17 RX ADMIN — DILTIAZEM HYDROCHLORIDE 180 MG: 180 CAPSULE, COATED, EXTENDED RELEASE ORAL at 08:21

## 2021-12-17 RX ADMIN — METOPROLOL TARTRATE 50 MG: 50 TABLET, FILM COATED ORAL at 08:21

## 2021-12-17 RX ADMIN — Medication 10 ML: at 22:27

## 2021-12-17 RX ADMIN — GABAPENTIN 400 MG: 400 CAPSULE ORAL at 22:27

## 2021-12-17 RX ADMIN — FUROSEMIDE 20 MG: 20 TABLET ORAL at 08:21

## 2021-12-17 RX ADMIN — Medication 10 ML: at 14:00

## 2021-12-17 RX ADMIN — HYDROCODONE BITARTRATE AND ACETAMINOPHEN 1 TABLET: 5; 325 TABLET ORAL at 00:26

## 2021-12-17 RX ADMIN — METOPROLOL TARTRATE 50 MG: 50 TABLET, FILM COATED ORAL at 17:38

## 2021-12-17 RX ADMIN — DILTIAZEM HYDROCHLORIDE 120 MG: 120 CAPSULE, COATED, EXTENDED RELEASE ORAL at 22:26

## 2021-12-17 RX ADMIN — LOSARTAN POTASSIUM 100 MG: 50 TABLET, FILM COATED ORAL at 08:21

## 2021-12-17 RX ADMIN — ATORVASTATIN CALCIUM 80 MG: 40 TABLET, FILM COATED ORAL at 22:26

## 2021-12-17 RX ADMIN — ASPIRIN 81 MG 81 MG: 81 TABLET ORAL at 08:21

## 2021-12-17 NOTE — PROGRESS NOTES
0700- Report received from [facility], [facility]. Will assume care of the patient. 1030- Patient has successfully voided following lopez catheter removal.     1200- Assessments remains the same. Doppler signal to right foot. Patient resting comfortably in bed. No patient needs at this time. 1600- Neurovascular check remains unchanged. 65- Dr Navneet Easton at bedside. Patient to get out of bed. Will resume anticoagulation. Possible discharge tomorrow if patient feels well. 1910- Report given to Kesha Bonilla RN.

## 2021-12-17 NOTE — PROGRESS NOTES
Problem: Pain  Goal: *Control of Pain  12/17/2021 0852 by Manuel Phan RN  Outcome: Progressing Towards Goal  12/16/2021 1902 by Manuel Phan RN  Outcome: Progressing Towards Goal  Goal: *PALLIATIVE CARE:  Alleviation of Pain  12/17/2021 0852 by Manuel Phan RN  Outcome: Progressing Towards Goal  12/16/2021 1902 by Manuel Phan RN  Outcome: Progressing Towards Goal     Problem: Impaired Skin Integrity/Pressure Injury Treatment  Goal: *Improvement of Existing Pressure Injury  12/17/2021 0852 by Manuel Phan RN  Outcome: Progressing Towards Goal  12/16/2021 1902 by Manuel Phan RN  Outcome: Progressing Towards Goal  Goal: *Prevention of pressure injury  Description: Document Efrem Scale and appropriate interventions in the flowsheet. 12/17/2021 0852 by Manuel Phan RN  Outcome: Progressing Towards Goal  Note: Pressure Injury Interventions: Activity Interventions: Assess need for specialty bed,Increase time out of bed,Pressure redistribution bed/mattress(bed type),PT/OT evaluation    Mobility Interventions: Assess need for specialty bed,HOB 30 degrees or less,Pressure redistribution bed/mattress (bed type)    Nutrition Interventions: Document food/fluid/supplement intake,Discuss nutritional consult with provider                  12/16/2021 1902 by Manuel Phan RN  Outcome: Progressing Towards Goal  Note: Pressure Injury Interventions: Activity Interventions: Assess need for specialty bed,Increase time out of bed,Pressure redistribution bed/mattress(bed type),PT/OT evaluation    Mobility Interventions: Assess need for specialty bed,Pressure redistribution bed/mattress (bed type),PT/OT evaluation,Turn and reposition approx.  every two hours(pillow and wedges)    Nutrition Interventions: Document food/fluid/supplement intake,Discuss nutritional consult with provider                     Problem: Pressure Injury - Risk of  Goal: *Prevention of pressure injury  Description: Document Efrem Scale and appropriate interventions in the flowsheet. Outcome: Progressing Towards Goal  Note: Pressure Injury Interventions:             Activity Interventions: Assess need for specialty bed,Increase time out of bed,Pressure redistribution bed/mattress(bed type),PT/OT evaluation    Mobility Interventions: Assess need for specialty bed,HOB 30 degrees or less,Pressure redistribution bed/mattress (bed type)    Nutrition Interventions: Document food/fluid/supplement intake,Discuss nutritional consult with provider

## 2021-12-17 NOTE — PROGRESS NOTES
Tells me his leg feels better no further ischemic pain  Vital signs are stable today  Doppler signals only right foot the foot is warm  Incisions clean dry and intact  We will resume Xarelto, had possible cutaneous reaction to Eliquis  Out of bed and increase activity

## 2021-12-17 NOTE — PROGRESS NOTES
conducted an initial consultation and Spiritual Assessment for Yaima Chowdary, who is a 59 y.o.,male. Patients Primary Language is: [place]. According to the patients EMR [place] Affiliation is: No preference.      The reason the Patient came to the hospital is:   Patient Active Problem List    Diagnosis Date Noted    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[address] 03/31/2021    Skin ulcer of malleolar area of right ankle ([address]     Prolonged Q-T interval on ECG 08/19/2017    Adverse effect of amiodarone 08/19/2017    Status post above-knee amputation of left lower extremity ([address] 08/18/2017    Aftercare for amputation stump 08/18/2017    Ischemic rest pain of lower extremity 08/14/2017    Chronic ulcer of right heel ([address] 08/08/2017    Chronic anemia     Acute blood loss as cause of postoperative anemia 07/25/2017    Aftercare following surgery of the circulatory system 07/25/2017    Impaired mobility and ADLs 07/24/2017    Infection of vascular bypass graft ([address] 07/24/2017    Moderate to severe pulmonary hypertension ([address] 07/23/2017    Pseudoaneurysm of femoral artery ([address] 06/27/2017    History of vitamin D deficiency 04/22/2017    Status post femoral-popliteal bypass surgery 04/21/2017    Ischemic cardiomyopathy     Chronic systolic heart failure (HCC)     Carotid artery disease ([address]     Dyslipidemia     History of cardioversion 04/18/2017    Paroxysmal atrial fibrillation ([address]     Critical ischemia of lower extremity ([address] 04/10/2017    Euthyroid sick syndrome 04/06/2017    Coronary artery disease involving native coronary artery of native heart 03/15/2017    Aortoiliac occlusive disease ([address] 01/25/2017    Atherosclerosis of native artery of both lower extremities with intermittent claudication ([address] 01/25/2017    Peripheral artery disease ([address] 01/25/2017    Hereditary peripheral neuropathy 11/15/2016    Erectile dysfunction 07/05/2016    Spinal stenosis of lumbar region with radiculopathy 05/04/2015    Benign hypertensive heart disease with systolic congestive heart failure, NYHA class 2 ([facility] [address] 01/26/2015    DDD (degenerative disc disease), lumbar 01/26/2015        The  provided the following Interventions:   attempted to Initiate a relationship of care and support with patient in room 2354 this morning but found the patient resting peacefully after his procedure this morning. Patient is unable to communicate at present. Provided information about Spiritual Care Services. Offered prayer and assurance of continued prayers on patients behalf. .    Assessment:  Patient does not have any Hoahaoism/cultural needs that will affect patients preferences in health care. There are no further spiritual or Hoahaoism issues which require Spiritual Care Services interventions at this time. Plan:  Chaplains will continue to follow and will provide pastoral care on an as needed/requested basis    . Dory Figueroa   Spiritual Care   [phone]

## 2021-12-17 NOTE — ACP (ADVANCE CARE PLANNING)
Advance Care Planning     General Advance Care Planning (ACP) Conversation      Date of Conversation: 12/17/21  Conducted with: Patient with Decision Making Capacity    Healthcare Decision Maker:     Primary Decision Maker: Aroldo Flores - Daughter - [phone]  Click here to complete [address] including selection of the Healthcare Decision Maker Relationship (ie \"Primary\")      Today we documented Decision Maker(s) consistent with Legal Next of Kin hierarchy. Content/Action Overview:    Has ACP document(s) on file - reflects the patient's care preferences          ANUM HawkinsN RN  Care Management  Pager: [phone]

## 2021-12-17 NOTE — PROGRESS NOTES
Reason for Admission:  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[address] [I70.201]                 RUR Score:    10           Plan for utilizing home health: To be determined                      Likelihood of Readmission:   LOW                         Transition of Care Plan:              Initial assessment completed with patient. Cognitive status of patient: oriented to time, place, person and situation. Face sheet information confirmed:  yes. The patient designates his daughter María Elena Ruth to participate in his discharge plan and to receive any needed information. This patient lives in a  home alone. Patient is able to navigate steps as needed. Prior to hospitalization, patient was considered to be independent with ADLs/IADLS : yes . Patient has a current ACP document on file: yes      Healthcare Decision Maker:   Primary Decision Maker: Salvatore Barton - Daughter - [phone]    Click here to complete [place] including selection of the Healthcare Decision Maker Relationship (ie \"Primary\")    The daughter will be available to transport patient home upon discharge. The patient already has [address], W/C medical equipment available in the home. Patient is not currently active with home health. Patient has  stayed in a skilled nursing facility or rehab. Was  stay within last 60 days : no. This patient is on dialysis :no     Currently, the discharge plan is Home. The patient states that he can obtain his medications from the pharmacy, and take his medications as directed. Patient's current insurance is [employer]       Care Management Interventions  PCP Verified by CM: Yes  Palliative Care Criteria Met (RRAT>21 & CHF Dx)?: No  Mode of Transport at Discharge:  Other (see comment)  Transition of Care Consult (CM Consult): Discharge Planning  Support Systems: Child(mile)  Confirm Follow Up Transport: Family  Discharge Location  Discharge Placement: Home with family assistance        ANUM HolbrookN RN  Care Management  Pager: [phone]

## 2021-12-17 NOTE — PROGRESS NOTES
Problem: Pain  Goal: *Control of Pain  Outcome: Progressing Towards Goal  Goal: *PALLIATIVE CARE:  Alleviation of Pain  Outcome: Progressing Towards Goal     Problem: Impaired Skin Integrity/Pressure Injury Treatment  Goal: *Improvement of Existing Pressure Injury  Outcome: Progressing Towards Goal  Goal: *Prevention of pressure injury  Description: Document Efrem Scale and appropriate interventions in the flowsheet. Outcome: Progressing Towards Goal  Note: Pressure Injury Interventions: Activity Interventions: Assess need for specialty bed,Increase time out of bed,Pressure redistribution bed/mattress(bed type),PT/OT evaluation    Mobility Interventions: Assess need for specialty bed,Pressure redistribution bed/mattress (bed type),PT/OT evaluation,Turn and reposition approx.  every two hours(pillow and wedges)    Nutrition Interventions: Document food/fluid/supplement intake,Discuss nutritional consult with provider                     Problem: Patient Education: Go to Patient Education Activity  Goal: Patient/Family Education  Outcome: Progressing Towards Goal

## 2021-12-17 NOTE — PROGRESS NOTES
Received patient from OR. Patient alert, following commands and responding appropriately. Right pervena dressing clean, dry and intact. Pulses doppler to right foot. Patient placed on cardiac monitoring and is without complaint. Call bell within reach. 1900- Report given to Georgia Bill RN who will assume care of the patient.

## 2021-12-18 PROCEDURE — 74011250637 HC RX REV CODE- 250/637: Performed by: SURGERY

## 2021-12-18 PROCEDURE — 65620000000 HC RM CCU GENERAL

## 2021-12-18 RX ADMIN — DILTIAZEM HYDROCHLORIDE 120 MG: 120 CAPSULE, COATED, EXTENDED RELEASE ORAL at 21:42

## 2021-12-18 RX ADMIN — Medication 10 ML: at 06:00

## 2021-12-18 RX ADMIN — HYDROCODONE BITARTRATE AND ACETAMINOPHEN 1 TABLET: 5; 325 TABLET ORAL at 16:53

## 2021-12-18 RX ADMIN — METOPROLOL TARTRATE 50 MG: 50 TABLET, FILM COATED ORAL at 08:00

## 2021-12-18 RX ADMIN — FUROSEMIDE 20 MG: 20 TABLET ORAL at 08:00

## 2021-12-18 RX ADMIN — Medication 10 ML: at 21:43

## 2021-12-18 RX ADMIN — GABAPENTIN 400 MG: 400 CAPSULE ORAL at 08:00

## 2021-12-18 RX ADMIN — ATORVASTATIN CALCIUM 80 MG: 40 TABLET, FILM COATED ORAL at 21:42

## 2021-12-18 RX ADMIN — ASPIRIN 81 MG 81 MG: 81 TABLET ORAL at 08:00

## 2021-12-18 RX ADMIN — GABAPENTIN 400 MG: 400 CAPSULE ORAL at 16:53

## 2021-12-18 RX ADMIN — METOPROLOL TARTRATE 50 MG: 50 TABLET, FILM COATED ORAL at 17:13

## 2021-12-18 RX ADMIN — LOSARTAN POTASSIUM 100 MG: 50 TABLET, FILM COATED ORAL at 08:00

## 2021-12-18 RX ADMIN — RIVAROXABAN 20 MG: 20 TABLET, FILM COATED ORAL at 08:00

## 2021-12-18 RX ADMIN — HYDROCODONE BITARTRATE AND ACETAMINOPHEN 1 TABLET: 5; 325 TABLET ORAL at 21:42

## 2021-12-18 RX ADMIN — GABAPENTIN 400 MG: 400 CAPSULE ORAL at 21:42

## 2021-12-18 RX ADMIN — Medication 10 ML: at 15:12

## 2021-12-18 RX ADMIN — DILTIAZEM HYDROCHLORIDE 180 MG: 180 CAPSULE, COATED, EXTENDED RELEASE ORAL at 08:00

## 2021-12-18 NOTE — PROGRESS NOTES
Has been up and out of chair today without complaint  Back in bed now says feels tired. Hand-held Doppler multiphasic infrapopliteal monophasic at the right foot. Foot is warm and has normal color. Now has sensation of mobility which was not present before.   Resumed Xarelto, plan for discharge tomorrow  May need follow-up angio in the near future

## 2021-12-18 NOTE — PROGRESS NOTES
1915-Received report/ assumed care of pt.    2000-Pt assessment done. Pt sitting in chair watching TV with no complaints at this time. 2100-Pt got back into bed. 0000-Pt resting quietly with no distress noted. 0600-Complete bath done by pt with assistance by nurse.

## 2021-12-19 VITALS
BODY MASS INDEX: 24.9 KG/M2 | RESPIRATION RATE: 17 BRPM | HEIGHT: 70 IN | HEART RATE: 74 BPM | OXYGEN SATURATION: 100 % | SYSTOLIC BLOOD PRESSURE: 126 MMHG | WEIGHT: 173.94 LBS | TEMPERATURE: 98.6 F | DIASTOLIC BLOOD PRESSURE: 82 MMHG

## 2021-12-19 PROCEDURE — 74011250637 HC RX REV CODE- 250/637: Performed by: SURGERY

## 2021-12-19 RX ORDER — HYDROCODONE BITARTRATE AND ACETAMINOPHEN 5; 325 MG/1; MG/1
1 TABLET ORAL
Qty: 40 TABLET | Refills: 0 | Status: SHIPPED | OUTPATIENT
Start: 2021-12-19 | End: 2021-12-26

## 2021-12-19 RX ADMIN — LOSARTAN POTASSIUM 100 MG: 50 TABLET, FILM COATED ORAL at 08:27

## 2021-12-19 RX ADMIN — RIVAROXABAN 20 MG: 20 TABLET, FILM COATED ORAL at 08:27

## 2021-12-19 RX ADMIN — FUROSEMIDE 20 MG: 20 TABLET ORAL at 08:27

## 2021-12-19 RX ADMIN — Medication 10 ML: at 06:00

## 2021-12-19 RX ADMIN — METOPROLOL TARTRATE 50 MG: 50 TABLET, FILM COATED ORAL at 08:27

## 2021-12-19 RX ADMIN — DILTIAZEM HYDROCHLORIDE 180 MG: 180 CAPSULE, COATED, EXTENDED RELEASE ORAL at 08:27

## 2021-12-19 RX ADMIN — GABAPENTIN 400 MG: 400 CAPSULE ORAL at 08:27

## 2021-12-19 RX ADMIN — ASPIRIN 81 MG 81 MG: 81 TABLET ORAL at 08:27

## 2021-12-19 NOTE — DISCHARGE INSTRUCTIONS
Patient Education        Femoral Endarterectomy: What to Expect at [address] had a femoral endarterectomy (say \"FEM--suhail georgefn-aoq-zdx-REK-tuh-joe\"). It was done to remove fatty buildup (plaque) from the femoral artery. You will have some pain from the cut (incision) the doctor made. This usually gets better after a couple of days. Your doctor will give you pain medicine for this. Your leg may be swollen at first. This may last 2 to 3 months. You will have stitches or staples in the incision. If you have stitches, they may dissolve on their own. Or your doctor may take them out 7 to 14 days after your surgery. After surgery, blood may flow better throughout your leg, which can decrease leg pain, numbness, and cramping. You may be able to walk longer distances without leg pain. This care sheet gives you a general idea about how long it will take for you to recover. But each person recovers at a different pace. Follow the steps below to get better as quickly as possible. How can you care for yourself at home? Activity    · Rest when you feel tired. Getting enough sleep will help you recover.     · Try to walk every day or as often as your doctor tells you. Start by walking a little more than you did the day before. Bit by bit, increase the amount you walk. Walking boosts blood flow and helps prevent pneumonia and constipation.     · Avoid strenuous activities, such as bicycle riding, jogging, weight lifting, or aerobic exercise, until your doctor says it is okay.     · Ask your doctor when you can drive again.     · You will probably need to take off 1 to 4 weeks from work. It depends on the type of work you do and how you feel.     · You may shower as usual. Do not take a bath for the first 2 weeks, or until your doctor tells you it is okay. Diet    · You can eat your normal diet.  If your stomach is upset, try bland, low-fat foods like plain rice, broiled chicken, toast, and yogurt.     · Drink plenty of fluids (unless your doctor tells you not to).     · You may notice that your bowel movements are not regular right after your surgery. This is common. You may want to take a fiber supplement every day. If you have not had a bowel movement after a couple of days, ask your doctor about taking a mild laxative. Medicines    · Your doctor will tell you if and when you can restart your medicines. He or she will also give you instructions about taking any new medicines.     · If you take aspirin or some other blood thinner, ask your doctor if and when to start taking it again. Make sure that you understand exactly what your doctor wants you to do.     · Be safe with medicines. Take your medicines exactly as prescribed. Call your doctor if you think you are having a problem with your medicine.     · Take pain medicines exactly as directed. ? If the doctor gave you a prescription medicine for pain, take it as prescribed. ? If you are not taking a prescription pain medicine, ask your doctor if you can take an over-the-counter medicine.     · If you think your pain medicine is making you sick to your stomach:  ? Take your medicine after meals (unless your doctor has told you not to). ? Ask your doctor for a different pain medicine.     · If your doctor prescribed antibiotics, take them as directed. Do not stop taking them just because you feel better. You need to take the full course of antibiotics.     · Your doctor may prescribe a blood thinner when you go home. This helps prevent blood clots. Be sure you get instructions about how to take your medicine safely. Blood thinners can cause serious bleeding problems. Incision care    · If you have strips of tape on the cut (incision) the doctor made, leave the tape on for a week or until it falls off. Or follow your doctor's instructions for removing the tape.     · Wash the area daily with warm, soapy water, and pat it dry.  Don't use hydrogen peroxide or alcohol, which can slow healing. You may cover the area with a gauze bandage if it weeps or rubs against clothing. Change the bandage every day.     · Keep the area clean and dry. Follow-up care is a key part of your treatment and safety. Be sure to make and go to all appointments, and call your doctor if you are having problems. It's also a good idea to know your test results and keep a list of the medicines you take. When should you call for help? Call 911 anytime you think you may need emergency care. For example, call if:    · You passed out (lost consciousness).     · You have trouble breathing. Call your doctor now or seek immediate medical care if:    · You have severe pain in your leg, or it becomes cold, pale, blue, tingly, or numb.     · You have pain that does not get better after you take pain medicine.     · You have loose stitches, or your incision comes open.     · You are bleeding a lot from the incision.     · You have signs of infection, such as:  ? Increased pain, swelling, warmth, or redness. ? Red streaks leading from the incision. ? Pus draining from the incision. ? A fever.     · You are sick to your stomach or cannot keep fluids down. Watch closely for any changes in your health, and be sure to contact your doctor if:    · You do not get better as expected. Where can you learn more? Go to http://www.gray.com/  Enter P886 in the search box to learn more about \"Femoral Endarterectomy: What to Expect at Home. \"  Current as of: April 29, 2021               Content Version: 13.0  © 2006-2021 [employer]. Care instructions adapted under license by [employer] (which disclaims liability or warranty for this information). If you have questions about a medical condition or this instruction, always ask your healthcare professional. [address] any warranty or liability for your use of this information. Patient Education        Peripheral Arterial Disease (PAD): Care Instructions  Overview  Peripheral arterial disease (PAD) occurs when the blood vessels (arteries) that supply blood to the legs, belly, pelvis, arms, or neck get narrow or blocked. This reduces blood flow to that area. The legs are affected most often. PAD is often caused by fatty buildup (plaque) in the arteries. This buildup is also called \"hardening\" of the arteries. Your risk of PAD increases if you smoke or have high cholesterol, high blood pressure, diabetes, or a family history of PAD. Many people don't have symptoms. If you do have symptoms, you may have weak or tired legs, difficulty walking or balancing, or pain. If you have pain, you might feel a tight, aching, or squeezing pain in the calf, foot, thigh, or buttock that occurs during exercise. The pain usually gets worse during exercise and goes away when you rest. If PAD gets worse, you may have symptoms of poor blood flow, such as leg pain when you rest.  Medicines and lifestyle changes may help your symptoms and lower your risk of heart attack and stroke. In some cases, surgery or other treatment is needed. It is important that you follow up with your doctor. Follow-up care is a key part of your treatment and safety. Be sure to make and go to all appointments, and call your doctor if you are having problems. It's also a good idea to know your test results and keep a list of the medicines you take. How can you care for yourself at home? · Do not smoke. Smoking can make PAD worse. If you need help quitting, talk to your doctor about stop-smoking programs and medicines. These can increase your chances of quitting for good. · Take your medicines exactly as prescribed. Call your doctor if you think you are having a problem with your medicine. · If you take a blood thinner, such as aspirin, be sure to get instructions about how to take your medicine safely.  Blood thinners can cause serious bleeding problems. · Ask your doctor if a cardiac rehab program is right for you. Cardiac rehab can help you make lifestyle changes. In cardiac rehab, a team of health professionals provides education and support to help you make new, healthy habits. · Eat heart-healthy foods such as fruits, vegetables, whole grains, fish, lean meats, and low-fat or nonfat dairy foods. Limit sodium, sugar, and alcohol. · If your doctor recommends it, get more exercise. Walking is a good choice. Bit by bit, increase the amount you walk every day. Try for at least 30 minutes on most days of the week. If you have symptoms when you exercise, ask your doctor about a special exercise program that may help relieve your symptoms. · Stay at a healthy weight. Lose weight if you need to. · Take good care of your feet. ? Treat cuts and scrapes on your legs right away. Poor blood flow prevents (or slows) quick healing of even small cuts or scrapes. This is even more important if you have diabetes. ? Avoid shoes that are too tight or that rub your feet. Shoes should be comfortable and fit well. ? Avoid socks or stockings that are tight enough to leave elastic-band marks on your legs. Tight socks can make circulation problems worse. ? Keep your feet clean and moisturized to prevent drying and cracking. Place cotton or lamb's wool between your toes to prevent rubbing and to absorb moisture. ? If you have a sore on your leg or foot, keep it dry and cover it with a nonstick bandage until you see your doctor. When should you call for help? Call 911 anytime you think you may need emergency care. For example, call if:    · You have symptoms of a heart attack. These may include:  ? Chest pain or pressure, or a strange feeling in the chest.  ? Sweating. ? Shortness of breath. ? Nausea or vomiting. ? Pain, pressure, or a strange feeling in the back, neck, jaw, or upper belly or in one or both shoulders or arms.   ? Lightheadedness or sudden weakness. ? A fast or irregular heartbeat. After you call 911, the  may tell you to chew 1 adult-strength or 2 to 4 low-dose aspirin. Wait for an ambulance. Do not try to drive yourself.    · You have sudden, severe leg pain, and your leg is cool and pale.     · You have symptoms of a stroke. These may include:  ? Sudden numbness, tingling, weakness, or loss of movement in your face, arm, or leg, especially on only one side of your body. ? Sudden vision changes. ? Sudden trouble speaking. ? Sudden confusion or trouble understanding simple statements. ? Sudden problems with walking or balance. ? A sudden, severe headache that is different from past headaches. Call your doctor now or seek immediate medical care if:    · You have leg pain that does not go away even if you rest.     · Your leg pain changes or gets worse. For example, if you have more pain with normal activity or the same pain with decreased activity, you should call.     · You have cold or numb feet or toes.     · You have leg or foot sores that are slow to heal.     · The skin on your legs or feet changes color.     · You have an open sore on your leg or foot that is infected. Signs of infection include:  ? Increased pain, swelling, warmth, or redness. ? Red streaks leading from the sore. ? Pus draining from the sore. ? A fever. Watch closely for changes in your health, and be sure to contact your doctor if you have any problems. Where can you learn more? Go to http://www.gray.com/  Enter H735 in the search box to learn more about \"Peripheral Arterial Disease (PAD): Care Instructions. \"  Current as of: April 29, 2021               Content Version: 13.0  © 5128-6142 [employer]. Care instructions adapted under license by [employer] (which disclaims liability or warranty for this information).  If you have questions about a medical condition or this instruction, always ask your healthcare professional. [place] [address] any warranty or liability for your use of this information.

## 2021-12-19 NOTE — PROGRESS NOTES
0700: Bedside and Verbal shift change report given to This Writer (oncoming nurse) by Suraj Griffiths RN  (offgoing nurse). Report included the following information SBAR, Kardex, Intake/Output, MAR, Recent Results and Cardiac Rhythm Afib.     0900: Patient resting in bed watching TV, NAD noted. 1150: Discharge instructions reviewed with patient. All questions answered. Patient discharged with all belongings including dentures, personal wheelchair, and cell phone.

## 2021-12-19 NOTE — DISCHARGE SUMMARY
Physician Discharge Summary     Patient ID:  Kiko Bronson  [id number]  22 y.o.  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 Status post right bypass graft thrombectomy and balloon angioplasty of popliteal artery. Procedures for this admission: Procedure(s):  REVISION OF RIGHT LEG BYPASS / THROMBECTOMY / BALLOON ANGIOPLASTY    Discharged Condition: stable    Hospital Course: Admitted to hospital for surgery. Patient had thrombectomy of right leg bypass graft and right leg angiogram.  Right popliteal artery was angioplastied to improve outflow. Postoperatively his ischemia resolved and is resumed ambulation. He has a left leg amputation as well. Consults: None    Significant Diagnostic Studies: angiography: Bypass graft and popliteal occlusion    Treatments: surgery: Thrombectomy and revision of right leg with angioplasty    Discharge Exam: LUNG: clear to auscultation bilaterally  HEART: S1, S2 normal  ABDOMEN:  no change  Right groin incisions intact wound management system is in place. Doppler signals present at the foot with multiphasic popliteal signals as well. Disposition: home    Patient Instructions:   Current Discharge Medication List      CONTINUE these medications which have NOT CHANGED    Details   !! dilTIAZem ER (CARDIZEM CD) 120 mg capsule Take 1 Capsule by mouth nightly. Qty: 30 Capsule, Refills: 6      !! dilTIAZem ER (CARDIZEM CD) 180 mg capsule Take 1 tablet by mouth once every morning. Qty: 30 Capsule, Refills: 6      rivaroxaban (Xarelto) 20 mg tab tablet Take 1 Tablet by mouth daily. Qty: 30 Tablet, Refills: 6      furosemide (LASIX) 20 mg tablet Take 1 Tablet by mouth daily.   Qty: 90 Tablet, Refills: 1 Associated Diagnoses: Benign hypertensive heart disease with systolic congestive heart failure, NYHA class 2 ([facility] [address] Chronic systolic heart failure (HCC)      metoprolol tartrate (LOPRESSOR) 50 mg tablet TAKE 1 TABLET BY MOUTH TWO (2) TIMES A DAY. Qty: 180 Tablet, Refills: 0      losartan (COZAAR) 100 mg tablet Take 1 Tablet by mouth daily. Qty: 90 Tablet, Refills: 3      atorvastatin (LIPITOR) 80 mg tablet TAKE 1 TABLET ONCE DAILY  Qty: 90 Tablet, Refills: 3      aspirin 81 mg chewable tablet Take 1 Tab by mouth daily. Qty: 90 Tab, Refills: 3      gabapentin (NEURONTIN) 400 mg capsule take 1 capsule by mouth three times a day  Qty: 270 Cap, Refills: 0      multivitamin (ONE A DAY) tablet Take 1 Tab by mouth daily. !! - Potential duplicate medications found. Please discuss with provider. Reference discharge instructions as provided by nursing for diet, labs, medications, activity, wound care and any outpatient referrals.     Follow-up with Dr. Aleks Moss    Signed:  Mindi Hoff MD  12/19/2021  11:08 AM

## 2021-12-20 NOTE — H&P
Surgery History and Physical    Subjective: Mallory Golden is a 59 y.o.  male who presents with subacute occlusion right fem pop graft with pain and numbness of right foot.     Patient Active Problem List    Diagnosis Date Noted    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[address] 03/31/2021    Skin ulcer of malleolar area of right ankle ([address]     Prolonged Q-T interval on ECG 08/19/2017    Adverse effect of amiodarone 08/19/2017    Status post above-knee amputation of left lower extremity ([address] 08/18/2017    Aftercare for amputation stump 08/18/2017    Ischemic rest pain of lower extremity 08/14/2017    Chronic ulcer of right heel ([address] 08/08/2017    Chronic anemia     Acute blood loss as cause of postoperative anemia 07/25/2017    Aftercare following surgery of the circulatory system 07/25/2017    Impaired mobility and ADLs 07/24/2017    Infection of vascular bypass graft ([address] 07/24/2017    Moderate to severe pulmonary hypertension ([address] 07/23/2017    Pseudoaneurysm of femoral artery ([address] 06/27/2017    History of vitamin D deficiency 04/22/2017    Status post femoral-popliteal bypass surgery 04/21/2017    Ischemic cardiomyopathy     Chronic systolic heart failure (HCC)     Carotid artery disease ([address]     Dyslipidemia     History of cardioversion 04/18/2017    Paroxysmal atrial fibrillation ([address]     Critical ischemia of lower extremity ([address] 04/10/2017    Euthyroid sick syndrome 04/06/2017    Coronary artery disease involving native coronary artery of native heart 03/15/2017    Aortoiliac occlusive disease ([address] 01/25/2017    Atherosclerosis of native artery of both lower extremities with intermittent claudication ([address] 01/25/2017    Peripheral artery disease ([address] 01/25/2017    Hereditary peripheral neuropathy 11/15/2016    Erectile dysfunction 07/05/2016    Spinal stenosis of lumbar region with radiculopathy 05/04/2015    Benign hypertensive heart disease with systolic congestive heart failure, NYHA class 2 ([address] 01/26/2015    DDD (degenerative disc disease), lumbar 01/26/2015     Past Medical History:   Diagnosis Date    Acute blood loss as cause of postoperative anemia 4/4/2017    Anticoagulated on Coumadin     Aortoiliac occlusive disease ([address] 1/25/2017    Atherosclerosis of native artery of both lower extremities with intermittent claudication ([address] 1/25/2017    Benign hypertensive heart disease with systolic congestive heart failure, NYHA class 2 ([address] 1/26/2015    Carotid artery disease (HCC)     Chronic anemia     Chronic systolic heart failure (HCC)     Chronic ulcer of right foot ([address] 4/4/2017    Chronic ulcer of right heel ([address] 8/8/2017    Coronary artery disease involving native coronary artery of native heart 3/15/2017    Successful stenting of Cx (Xience LESLEY) and RCA (Xience LESLEY) to 0% by Dr. Joanne Gonzales on 3/15/17.     DDD (degenerative disc disease), lumbar 1/26/2015    Dyslipidemia     Erectile dysfunction 7/5/2016    Euthyroid sick syndrome 4/6/2017    Hereditary peripheral neuropathy 11/15/2016    History of cardioversion 4/18/2017    S/P Synchronized external cardioversion (4/18/2017 -  [facility])    History of vitamin D deficiency 4/22/2017    Vitamin D 25-Hydroxy (4/22/2017) = 12.0; Vitamin D 25-Hydroxy (5/26/2017) = 38.7    Infection of vascular bypass graft ([address] 7/24/2017    Left lower extremity    Ischemic cardiomyopathy     Moderate to severe pulmonary hypertension ([address] 7/23/2017    Paroxysmal atrial fibrillation (HCC)     Peripheral artery disease ([address] 1/25/2017    Skin ulcer of malleolar area of right ankle ([address]     Spinal stenosis of lumbar region with radiculopathy 5/4/2015    Dr. Bhakti Callejas      Past Surgical History:   Procedure Laterality Date    CARDIAC CATHETERIZATION  3/8/2017         CARDIAC CATHETERIZATION  3/15/2017         CORONARY STENT SINGLE W/PTCA  3/15/2017         HX ABOVE KNEE AMPUTATION Left 08/18/2017    S/P Left above-the-knee amputation (8/18/2017 - Dr. Mohsen Driver)    HX ATHERECTOMY Left 06/15/2017    S/P Atherectomy and balloon angioplasty of the left superficial femoral artery and above-knee popliteal artery (6/15/2017 - Dr. Mohsen Driver)    HX ATHERECTOMY Right 09/07/2017    S/P Atherectomy and balloon angioplasty of the below-the-knee popliteal artery, above-the-knee popliteal artery, tibioperoneal trunk artery and posterior tibial artery (9/7/2017 - Dr. Mohsen Driver)    HX COLONOSCOPY  07/23/2015    polyps repeat 2020 5 years Marilyn Henderson 89 Right 04/04/2017    S/P Right axillary to bifemoral artery bypass using an 8 mm Propaten graft from the right axilla to the right common femoral artery with a jump femoral-femoral bypass with another 8 mm Propaten external ring bypass; Right common femoral artery, and profunda femoris artery and superficial femoral artery endarterectomy causing an extensive surgery on the right side (4/4/2017 - Dr. Isabella Diana)    [address] 89 Right 04/07/2017    S/P Cutdown of femoral-femoral bypass, more on the left groin side; Right lower extremity angiography with first-order catheterization (4/7/2017 - Dr. Mohsen Driver)    HX FEMORAL BYPASS Right 04/10/2017    S/P Right femoral to above-knee popliteal bypass with an 8 mm PTFE graft (4/10/2017 - Dr. Mindi Hoff)    HX OTHER SURGICAL Left 07/25/2017    S/P Removal of infected graft; Repair of left superficial femoral artery; Repair of left common femoral artery (7/25/2017 - Dr. Mohsen Driver)    HX OTHER SURGICAL Right 06/27/2017    S/P Drainage of right side abscess (6/27/2017 - Dr. Mohsen Driver)    HX OTHER SURGICAL Left 07/01/2017    S/P Left groin exploration; Left femoral repair pseudoaneurysm; Left wound washout;  Wound VAC placement (7/01/2017 - Dr. Mohsen Driver)    HX OTHER SURGICAL Left 07/04/2017    S/P Left common femoral artery ligation; Jump bypass from right to left fem-fem to a more distal superficial femoral artery using 8 mm external ringed Propaten graft; Left groin wound exploration and washout (7/4/2017 - Dr. Keven Fu)    HX OTHER SURGICAL Right 08/18/2017    S/P Right axillary femoral jump bypass interposition; Removal of infected right axillary femoral bypass; Wound VAC placement of upper thigh 1.2 cm x 5.2 cm x 1.8 cm and groin 4 cm x 0.5 cm x 0.2 cm (8/18/2017 - Dr. Keven Fu)      Social History     Tobacco Use    Smoking status: Former Smoker    Smokeless tobacco: Never Used    Tobacco comment: quit in 2011   Substance Use Topics    Alcohol use: Yes     Alcohol/week: 2.0 standard drinks     Types: 2 Cans of beer per week     Comment: 10 cans of beer a month      Family History   Problem Relation Age of Onset    Heart Disease Father       Prior to Admission medications    Medication Sig Start Date End Date Taking? Authorizing Provider   HYDROcodone-acetaminophen (NORCO) 5-325 mg per tablet Take 1 Tablet by mouth every four (4) hours as needed for Pain for up to 7 days. Max Daily Amount: 6 Tablets. 12/19/21 12/26/21 Yes Darryn Stockton MD   dilTIAZem ER (CARDIZEM CD) 120 mg capsule Take 1 Capsule by mouth nightly. 12/7/21   Ross HODGES NP   dilTIAZem ER (CARDIZEM CD) 180 mg capsule Take 1 tablet by mouth once every morning. 12/7/21   Ross HODGES NP   rivaroxaban (Xarelto) 20 mg tab tablet Take 1 Tablet by mouth daily. 12/7/21   Ross HODGES NP   furosemide (LASIX) 20 mg tablet Take 1 Tablet by mouth daily. 11/23/21   Kerry Corbett MD   metoprolol tartrate (LOPRESSOR) 50 mg tablet TAKE 1 TABLET BY MOUTH TWO (2) TIMES A DAY. 10/29/21   Kerry Corbett MD   losartan (COZAAR) 100 mg tablet Take 1 Tablet by mouth daily.  10/7/21   Kerry Corbett MD   atorvastatin (LIPITOR) 80 mg tablet TAKE 1 TABLET ONCE DAILY 10/7/21   Kerry Corbett MD aspirin 81 mg chewable tablet Take 1 Tab by mouth daily. 4/10/20   Jeancarlos Kimble MD   gabapentin (NEURONTIN) 400 mg capsule take 1 capsule by mouth three times a day 3/29/18   Jeancarlos Kimble MD   multivitamin (ONE A DAY) tablet Take 1 Tab by mouth daily. Provider, Historical     Allergies   Allergen Reactions    Morphine Other (comments)     Patient gets confused with morphine. Review of Systems:    A comprehensive review of systems was negative except for that written in the History of Present Illness. Objective:     No data found. No data recorded. Physical Exam:  LUNG: clear to auscultation bilaterally, HEART: S1, S2 normal, ABDOMEN: soft, non-tender. Bowel sounds normal. No masses,  no organomegaly    Labs: No results found for this or any previous visit (from the past 24 hour(s)). Data Review:    CBC:   Lab Results   Component Value Date/Time    WBC 14.0 (H) 12/17/2021 06:30 AM    RBC 4.46 12/17/2021 06:30 AM    HGB 12.9 (L) 12/17/2021 06:30 AM    HCT 39.9 12/17/2021 06:30 AM    PLATELET 548 44/22/2794 06:30 AM      BMP:   Lab Results   Component Value Date/Time    Glucose 130 (H) 12/17/2021 06:30 AM    Sodium 140 12/17/2021 06:30 AM    Potassium 4.0 12/17/2021 06:30 AM    Chloride 110 12/17/2021 06:30 AM    CO2 25 12/17/2021 06:30 AM    BUN 11 12/17/2021 06:30 AM    Creatinine 0.89 12/17/2021 06:30 AM    Calcium 8.7 12/17/2021 06:30 AM       Assessment:     Active Problems:    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[address] (3/31/2021)        Plan:      Thrombectomy revision right leg fem pop    Signed By: Smiley Villasenor MD     December 20, 2021

## 2021-12-21 ENCOUNTER — OFFICE VISIT (OUTPATIENT)
Dept: FAMILY MEDICINE CLINIC | Age: 61
End: 2021-12-21
Payer: COMMERCIAL

## 2021-12-21 VITALS
SYSTOLIC BLOOD PRESSURE: 118 MMHG | RESPIRATION RATE: 16 BRPM | HEIGHT: 70 IN | OXYGEN SATURATION: 96 % | HEART RATE: 95 BPM | DIASTOLIC BLOOD PRESSURE: 74 MMHG | BODY MASS INDEX: 26.92 KG/M2 | TEMPERATURE: 98 F | WEIGHT: 188 LBS

## 2021-12-21 DIAGNOSIS — Z89.612 STATUS POST ABOVE-KNEE AMPUTATION OF LEFT LOWER EXTREMITY (HCC): ICD-10-CM

## 2021-12-21 DIAGNOSIS — I48.0 PAROXYSMAL ATRIAL FIBRILLATION (HCC): ICD-10-CM

## 2021-12-21 DIAGNOSIS — I73.9 PERIPHERAL ARTERY DISEASE (HCC): Primary | ICD-10-CM

## 2021-12-21 DIAGNOSIS — E78.5 DYSLIPIDEMIA: ICD-10-CM

## 2021-12-21 DIAGNOSIS — I70.213 ATHEROSCLEROSIS OF NATIVE ARTERY OF BOTH LOWER EXTREMITIES WITH INTERMITTENT CLAUDICATION (HCC): ICD-10-CM

## 2021-12-21 PROCEDURE — 99214 OFFICE O/P EST MOD 30 MIN: CPT | Performed by: FAMILY MEDICINE

## 2021-12-21 NOTE — PROGRESS NOTES
Cynthia Murdock, 59 y.o.,  male    SUBJECTIVE  Ff-up admission R femoral artery occlusion s/p revascularization    Date of admission 12/16-12/19    Reviewed discharge summary. Pt with known PAD, presented with R foot pain, found to have R femoral artery occlusion s/p bypass graft thrombectomy and balloon angioplasty of popliteal artery 12/16. Reports to be feeling well, was told wound vac to be removed today. I called Dr. Kym Schaefer nurse and instructed me on how to take it out. Wound appears clean, no discharge     s/p AKA Left leg and complicated revasculariation (2017).  Afib- reviewed notes placed on cardizem, metoprolol and now xarelto (?pruitus on eliquis) EF 55%    H/o CAD s/p angioplasty- continued on asa, BB.  plavix was discontinued Stress test 11/2020 low risk study.  following Dr. Adeola Fitzgerald    HTN- currently on cozaar, metoprolol      ROS:  See HPI, all others negative        Patient Active Problem List   Diagnosis Code    Benign hypertensive heart disease with systolic congestive heart failure, NYHA class 2 (HCC) I11.0, I50.20    DDD (degenerative disc disease), lumbar M51.36    Erectile dysfunction N52.9    Spinal stenosis of lumbar region with radiculopathy M48.061, M54.16    Hereditary peripheral neuropathy G60.9    Aortoiliac occlusive disease ([address] I74.09    Atherosclerosis of native artery of both lower extremities with intermittent claudication ([address] I70.213    Peripheral artery disease ([address] I73.9    Coronary artery disease involving native coronary artery of native heart I25.10    Paroxysmal atrial fibrillation (HCC) I48.0    Acute blood loss as cause of postoperative anemia D62    Impaired mobility and ADLs Z74.09, Z78.9    Ischemic cardiomyopathy I25.5    Chronic systolic heart failure (HCC) I50.22    Carotid artery disease (HCC) I77.9    Dyslipidemia E78.5    Euthyroid sick syndrome E07.81    Aftercare following surgery of the circulatory system Z48.812    Status post femoral-popliteal bypass surgery Z95.828    History of cardioversion Z98.890    Critical ischemia of lower extremity ([facility] I70.229    History of vitamin D deficiency Z86.39    Pseudoaneurysm of femoral artery ([facility]) I72.4    Infection of vascular bypass graft ([facility] T82. 7XXA    Chronic anemia D64.9    Chronic ulcer of right heel ([facility]) L97.419    Ischemic rest pain of lower extremity M79.606, I99.8    Prolonged Q-T interval on ECG R94.31    Status post above-knee amputation of left lower extremity ([facility] L67.963    Aftercare for amputation stump Z47.81    Moderate to severe pulmonary hypertension ([facility]) I27.20    Adverse effect of amiodarone T46.2X5A    Skin ulcer of malleolar area of right ankle ([facility]) L97.319    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[facility]) I70.201       Current Outpatient Medications   Medication Sig Dispense Refill    HYDROcodone-acetaminophen (NORCO) 5-325 mg per tablet Take 1 Tablet by mouth every four (4) hours as needed for Pain for up to 7 days. Max Daily Amount: 6 Tablets. 40 Tablet 0    dilTIAZem ER (CARDIZEM CD) 120 mg capsule Take 1 Capsule by mouth nightly. 30 Capsule 6    dilTIAZem ER (CARDIZEM CD) 180 mg capsule Take 1 tablet by mouth once every morning. 30 Capsule 6    rivaroxaban (Xarelto) 20 mg tab tablet Take 1 Tablet by mouth daily. 30 Tablet 6    furosemide (LASIX) 20 mg tablet Take 1 Tablet by mouth daily. 90 Tablet 1    metoprolol tartrate (LOPRESSOR) 50 mg tablet TAKE 1 TABLET BY MOUTH TWO (2) TIMES A DAY. 180 Tablet 0    losartan (COZAAR) 100 mg tablet Take 1 Tablet by mouth daily. 90 Tablet 3    atorvastatin (LIPITOR) 80 mg tablet TAKE 1 TABLET ONCE DAILY 90 Tablet 3    aspirin 81 mg chewable tablet Take 1 Tab by mouth daily. 90 Tab 3    gabapentin (NEURONTIN) 400 mg capsule take 1 capsule by mouth three times a day 270 Cap 0    multivitamin (ONE A DAY) tablet Take 1 Tab by mouth daily.          Allergies   Allergen Reactions    Morphine Other (comments) Patient gets confused with morphine. Past Medical History:   Diagnosis Date    Acute blood loss as cause of postoperative anemia 4/4/2017    Anticoagulated on Coumadin     Aortoiliac occlusive disease (HCC) 1/25/2017    Atherosclerosis of native artery of both lower extremities with intermittent claudication ([address] 1/25/2017    Benign hypertensive heart disease with systolic congestive heart failure, NYHA class 2 ([address] 1/26/2015    Carotid artery disease (HCC)     Chronic anemia     Chronic systolic heart failure (HCC)     Chronic ulcer of right foot ([address] 4/4/2017    Chronic ulcer of right heel ([address] 8/8/2017    Coronary artery disease involving native coronary artery of native heart 3/15/2017    Successful stenting of Cx (Xience LESLEY) and RCA (Xience LESLEY) to 0% by Dr. Elli Ro on 3/15/17.     DDD (degenerative disc disease), lumbar 1/26/2015    Dyslipidemia     Erectile dysfunction 7/5/2016    Euthyroid sick syndrome 4/6/2017    Hereditary peripheral neuropathy 11/15/2016    History of cardioversion 4/18/2017    S/P Synchronized external cardioversion (4/18/2017 - Dr. Coby Wolff)    History of vitamin D deficiency 4/22/2017    Vitamin D 25-Hydroxy (4/22/2017) = 12.0; Vitamin D 25-Hydroxy (5/26/2017) = 38.7    Infection of vascular bypass graft ([address] 7/24/2017    Left lower extremity    Ischemic cardiomyopathy     Moderate to severe pulmonary hypertension ([address] 7/23/2017    Paroxysmal atrial fibrillation (HCC)     Peripheral artery disease ([address] 1/25/2017    Skin ulcer of malleolar area of right ankle ([address]     Spinal stenosis of lumbar region with radiculopathy 5/4/2015    Dr. Juhi Koenig       Social History     Socioeconomic History    Marital status: LEGALLY      Spouse name: Not on file    Number of children: Not on file    Years of education: Not on file    Highest education level: Not on file   Occupational History    Not on file   Tobacco Use    Smoking status: Former Smoker    Smokeless tobacco: Never Used    Tobacco comment: quit in 2011   Substance and Sexual Activity    Alcohol use: Yes     Alcohol/week: 2.0 standard drinks     Types: 2 Cans of beer per week     Comment: 10 cans of beer a month    Drug use: Yes     Types: Marijuana     Comment: occasionally-last used 4/17    Sexual activity: Yes     Partners: Female   Other Topics Concern    Not on file   Social History Narrative    Not on file     Social Determinants of Health     Financial Resource Strain:     Difficulty of Paying Living Expenses: Not on file   Food Insecurity:     Worried About Running Out of Food in the Last Year: Not on file    [place] of Food in the Last Year: Not on file   Transportation Needs:     Lack of Transportation (Medical): Not on file    Lack of Transportation (Non-Medical):  Not on file   Physical Activity:     Days of Exercise per Week: Not on file    Minutes of Exercise per Session: Not on file   Stress:     Feeling of Stress : Not on file   Social Connections:     Frequency of Communication with Friends and Family: Not on file    Frequency of Social Gatherings with Friends and Family: Not on file    Attends Zoroastrianism Services: Not on file    Active Member of [address] [place] [address] or Organizations: Not on file    Attends Club or Organization Meetings: Not on file    Marital Status: Not on file   Intimate Partner Violence:     Fear of Current or Ex-Partner: Not on file    Emotionally Abused: Not on file    Physically Abused: Not on file    Sexually Abused: Not on file   Housing Stability:     Unable to Pay for Housing in the Last Year: Not on file    Number of [place] in the Last Year: Not on file    Unstable Housing in the Last Year: Not on file       Family History   Problem Relation Age of Onset    Heart Disease Father          OBJECTIVE    Physical Exam:     Visit Vitals  /74 (BP 1 Location: Left arm, BP Patient Position: Sitting, BP Cuff Size: Adult)   Pulse 95   Temp 98 °F (36.7 °C) (Temporal)   Resp 16   Ht 5' 10\" (1.778 m)   Wt 188 lb (85.3 kg)   SpO2 96%   BMI 26.98 kg/m²       General: alert, chronically ill-appearing,on wheelchair, in no apparent distress or pain  Neck: supple, no adenopathy palpated  CVS: normal rate, IRIR, distinct S1 and S2  Lungs:clear to ausculation bilaterally, no crackles, wheezing or rhonchi noted  Abdomen: normoactive bowel sounds, soft, non-tender  Extremities: L AKA prosthestic in place, R groin/thigh incision without discharge. Skin: no edema  Psych:  mood and affect normal      Results for orders placed or performed during the hospital encounter of 12/16/21   DRUG SCREEN, URINE   Result Value Ref Range    BENZODIAZEPINES Negative NEG      BARBITURATES Negative NEG      THC (TH-CANNABINOL) Positive (A) NEG      OPIATES Negative NEG      PCP(PHENCYCLIDINE) Negative NEG      COCAINE Negative NEG      AMPHETAMINES Negative NEG      METHADONE Negative NEG      HDSCOM (NOTE)    POTASSIUM   Result Value Ref Range    Potassium 4.9 3.5 - 5.5 mmol/L   CBC WITH AUTOMATED DIFF   Result Value Ref Range    WBC 14.0 (H) 4.6 - 13.2 K/uL    RBC 4.46 4.35 - 5.65 M/uL    HGB 12.9 (L) 13.0 - 16.0 g/dL    HCT 39.9 36.0 - 48.0 %    MCV 89.5 78.0 - 100.0 FL    MCH 28.9 24.0 - 34.0 PG    MCHC 32.3 31.0 - 37.0 g/dL    RDW 15.8 (H) 11.6 - 14.5 %    PLATELET 945 067 - 563 K/uL    MPV 8.8 (L) 9.2 - 11.8 FL    NRBC 0.0 0  WBC    ABSOLUTE NRBC 0.00 0.00 - 0.01 K/uL    NEUTROPHILS 88 (H) 40 - 73 %    LYMPHOCYTES 7 (L) 21 - 52 %    MONOCYTES 5 3 - 10 %    EOSINOPHILS 0 0 - 5 %    BASOPHILS 0 0 - 2 %    IMMATURE GRANULOCYTES 1 (H) 0.0 - 0.5 %    ABS. NEUTROPHILS 12.3 (H) 1.8 - 8.0 K/UL    ABS. LYMPHOCYTES 1.0 0.9 - 3.6 K/UL    ABS. MONOCYTES 0.6 0.05 - 1.2 K/UL    ABS. EOSINOPHILS 0.0 0.0 - 0.4 K/UL    ABS. BASOPHILS 0.0 0.0 - 0.1 K/UL    ABS. IMM.  GRANS. 0.1 (H) 0.00 - 0.04 K/UL    DF AUTOMATED     METABOLIC PANEL, BASIC   Result Value Ref Range    Sodium 140 136 - 145 mmol/L    Potassium 4.0 3.5 - 5.5 mmol/L    Chloride 110 100 - 111 mmol/L    CO2 25 21 - 32 mmol/L    Anion gap 5 3.0 - 18 mmol/L    Glucose 130 (H) 74 - 99 mg/dL    BUN 11 7.0 - 18 MG/DL    Creatinine 0.89 0.6 - 1.3 MG/DL    BUN/Creatinine ratio 12 12 - 20      GFR est AA >60 >60 ml/min/1.73m2    GFR est non-AA >60 >60 ml/min/1.73m2    Calcium 8.7 8.5 - 10.1 MG/DL   TYPE & SCREEN   Result Value Ref Range    Crossmatch Expiration 12/19/2021,2359     ABO/Rh(D) O POSITIVE     Antibody screen NEG        ASSESSMENT/PLAN  Diagnoses and all orders for this visit:    1. Peripheral artery disease ([facility]  S/p thrombectomy/balloon angioplasty 12/2021  S/p AKA 2017  On asa,statin  Following vasc sx. Wound vac removal today, has appt with them next week. 2. Paroxysmal atrial fibrillation (HCC)  Rate controlled, anticoagulated  On xarelto, ccb, BB       CBC WITH AUTOMATED DIFF; Future  -     METABOLIC PANEL, COMPREHENSIVE; Future  Following cardiology    3. Dyslipidemia  LDL goal <70  Cont statin  Lipid panel 12/2021    4. Atherosclerosis of native artery of both lower extremities with intermittent claudication (HCC)  On asa, BB    5. Status post above knee amputation Left lower extremity ([facility]    Follow-up and Dispositions    · Return keep appt in March, for non-fasting labs prior to your next visit, routine chronic illness care. Patient understands plan of care. Patient has provided input and agrees with goals.

## 2021-12-21 NOTE — PROGRESS NOTES
Chief Complaint   Patient presents with   [facility] Follow Up     Occlusion of right femoral artery, REVISION OF RIGHT LEG BYPASS / THROMBECTOMY / BALLOON ANGIOPLASTY (Right Groin)

## 2021-12-21 NOTE — PATIENT INSTRUCTIONS
A Healthy Heart: Care Instructions  Your Care Instructions     Coronary artery disease, also called heart disease, occurs when a substance called plaque builds up in the vessels that supply oxygen-rich blood to your heart muscle. This can narrow the blood vessels and reduce blood flow. A heart attack happens when blood flow is completely blocked. A high-fat diet, smoking, and other factors increase the risk of heart disease. Your doctor has found that you have a chance of having heart disease. You can do lots of things to keep your heart healthy. It may not be easy, but you can change your diet, exercise more, and quit smoking. These steps really work to lower your chance of heart disease. Follow-up care is a key part of your treatment and safety. Be sure to make and go to all appointments, and call your doctor if you are having problems. It's also a good idea to know your test results and keep a list of the medicines you take. How can you care for yourself at home? Diet    · Use less salt when you cook and eat. This helps lower your blood pressure. Taste food before salting. Add only a little salt when you think you need it. With time, your taste buds will adjust to less salt.     · Eat fewer snack items, fast foods, canned soups, and other high-salt, high-fat, processed foods.     · Read food labels and try to avoid saturated and trans fats. They increase your risk of heart disease by raising cholesterol levels.     · Limit the amount of solid fat-butter, margarine, and shortening-you eat. Use olive, peanut, or canola oil when you cook. Bake, broil, and steam foods instead of frying them.     · Eat a variety of fruit and vegetables every day. Dark green, deep orange, red, or yellow fruits and vegetables are especially good for you. Examples include spinach, carrots, peaches, and berries.     · Foods high in fiber can reduce your cholesterol and provide important vitamins and minerals.  High-fiber foods include whole-grain cereals and breads, oatmeal, beans, brown rice, citrus fruits, and apples.     · Eat lean proteins. Heart-healthy proteins include seafood, lean meats and poultry, eggs, beans, peas, nuts, seeds, and soy products.     · Limit drinks and foods with added sugar. These include candy, desserts, and soda pop. Lifestyle changes    · If your doctor recommends it, get more exercise. Walking is a good choice. Bit by bit, increase the amount you walk every day. Try for at least 30 minutes on most days of the week. You also may want to swim, bike, or do other activities.     · Do not smoke. If you need help quitting, talk to your doctor about stop-smoking programs and medicines. These can increase your chances of quitting for good. Quitting smoking may be the most important step you can take to protect your heart. It is never too late to quit.     · Limit alcohol to 2 drinks a day for men and 1 drink a day for women. Too much alcohol can cause health problems.     · Manage other health problems such as diabetes, high blood pressure, and high cholesterol. If you think you may have a problem with alcohol or drug use, talk to your doctor. Medicines    · Take your medicines exactly as prescribed. Call your doctor if you think you are having a problem with your medicine.     · If your doctor recommends aspirin, take the amount directed each day. Make sure you take aspirin and not another kind of pain reliever, such as acetaminophen (Tylenol). When should you call for help? Call 911 if you have symptoms of a heart attack. These may include:    · Chest pain or pressure, or a strange feeling in the chest.     · Sweating.     · Shortness of breath.     · Pain, pressure, or a strange feeling in the back, neck, jaw, or upper belly or in one or both shoulders or arms.     · Lightheadedness or sudden weakness.     · A fast or irregular heartbeat.    After you call 911, the  may tell you to chew 1 adult-strength or 2 to 4 low-dose aspirin. Wait for an ambulance. Do not try to drive yourself. Watch closely for changes in your health, and be sure to contact your doctor if you have any problems. Where can you learn more? Go to http://www.gomes.com/  Enter F075 in the search box to learn more about \"A Healthy Heart: Care Instructions. \"  Current as of: April 29, 2021               Content Version: 13.0  © 2006-2021 [employer]. Care instructions adapted under license by [employer] (which disclaims liability or warranty for this information). If you have questions about a medical condition or this instruction, always ask your healthcare professional. [address] any warranty or liability for your use of this information.

## 2022-01-05 NOTE — H&P
Vascular Surgery H&P      Patient: Stanley Domingo MRN: [id number]  [facility]: [id number]      YOB: 1960    Age: 59 y.o. Sex: male      DOA: 7/24/2017       HPI:     Stanley Domingo is a 59 y.o. male with MMP and complicated vascular history. He was admitted to [facility] over the weekend for severe sepsis. He states that Saturday he woke and was unable to breath and went to ED. CTA scan was negative for PE. ECHO showed severe right heart dilatation with reduced right ventricular global systolic function. EF 50%. He was placed on pressors and diuresed with good improvement. His WBCs were noted to be elevated at 27.7K and he was started on IV ABX. Reportedly blood cultures were positive however I am unable to review these results. His WBCs have improved to 12K today. He does have exposed graft in the left groin with purulent drainage. He denies any pain. He is currently off pressors and doing much better. He was transferred to [facility] for further treatment per his vascular surgeon. He has history of ax-fem bypass with jump fem-fem bypass and right fem-pop bypass. Recently he developed abscess over his ax-fem bypass requiring I&D and repair of left CFA pseudoaneurysm. He unfortunately had blow out of his left femoral anastomosis and had left CFA ligation with jump bypass from right to left fem-fem bypass.      Past Medical History:   Diagnosis Date    Acute blood loss as cause of postoperative anemia 4/4/2017    Anticoagulated on Coumadin     Aortoiliac occlusive disease (HCC) 1/25/2017    Atherosclerosis of native artery of both lower extremities with intermittent claudication ([address] 1/25/2017    Benign hypertensive heart disease with systolic congestive heart failure, NYHA class 2 ([address] 1/26/2015    Carotid artery disease (HCC)     Chronic atrial fibrillation (HCC)     Chronic systolic heart failure (HCC)     Chronic ulcer of right foot ([address] 4/4/2017    Coronary artery disease involving native coronary artery of native heart 3/15/2017    Successful stenting of Cx (Xience LESLEY) and RCA (Xience LESLEY) to 0% by Dr. Sahu Lawrence on 3/15/17.     DDD (degenerative disc disease), lumbar 1/26/2015    Dyslipidemia     Erectile dysfunction 7/5/2016    Euthyroid sick syndrome 4/6/2017    Hereditary peripheral neuropathy 11/15/2016    History of cardioversion 4/18/2017    S/P Synchronized external cardioversion (4/18/2017 - Dr. Debi Pardo)    Hypertension     Hyperuricemia     Ischemic cardiomyopathy     Peripheral artery disease ([facility] 1/25/2017    Spinal stenosis of lumbar region with radiculopathy 5/4/2015    Dr. Akua Ritter Vitamin D deficiency 4/22/2017    Vitamin D 25-Hydroxy (4/22/2017) = 12.0       Past Surgical History:   Procedure Laterality Date    CARDIAC CATHETERIZATION  3/8/2017         CARDIAC CATHETERIZATION  3/15/2017         CORONARY STENT SINGLE W/PTCA  3/15/2017         HX COLONOSCOPY  07/23/2015    polyps repeat 2020 5 years Vidhi Henderson    HX FEMORAL BYPASS Right 04/04/2017    S/P Right axillary to bifemoral artery bypass using an 8 mm Propaten graft from the right axilla to the right common femoral artery with a jump femoral-femoral bypass with another 8 mm Propaten external ring bypass; Right common femoral artery, and profunda femoris artery and superficial femoral artery endarterectomy causing an extensive surgery on the right side (4/4/2017 - Dr. Susan Rashid)    [address] Right 04/07/2017    S/P Cutdown of femoral-femoral bypass, more on the left groin side; Right lower extremity angiography with first-order catheterization (4/7/2017 - Dr. Starlett Lefort)    HX FEMORAL BYPASS Right 04/10/2017    S/P Right femoral to above-knee popliteal bypass with an 8 mm PTFE graft (4/10/2017 - Dr. Tae Bowen)       Family History   Problem Relation Age of Onset    Heart Disease Father        Social History     Social History    Marital status: LEGALLY  Spouse name: N/A    Number of children: N/A    Years of education: N/A     Social History Main Topics    Smoking status: Former Smoker    Smokeless tobacco: Never Used      Comment: quit in 2011    Alcohol use 1.2 oz/week     2 Cans of beer per week      Comment: 10 cans of beer a month    Drug use: Yes     Special: Marijuana      Comment: occasionally    Sexual activity: Yes     Partners: Female     Other Topics Concern    Not on file     Social History Narrative       Prior to Admission medications    Medication Sig Start Date End Date Taking? Authorizing Provider   amiodarone (CORDARONE) 200 mg tablet 200 mg. 7/13/17  Yes Historical Provider   ascorbic acid, vitamin C, (VITAMIN C) 250 mg tablet 250 mg. 6/2/17  Yes Historical Provider   aspirin 81 mg chewable tablet 81 mg. 6/2/17  Yes Historical Provider   cholecalciferol (VITAMIN D3) 1,000 unit tablet 1,000 Units. 6/2/17  Yes Historical Provider   cyanocobalamin 1,000 mcg tablet 1,000 mcg. 10/6/16  Yes Historical Provider   DULoxetine (CYMBALTA) 60 mg capsule 60 mg. 6/2/17  Yes Historical Provider   ferrous sulfate 325 mg (65 mg iron) tablet 325 mg. 6/2/17  Yes Historical Provider   gabapentin (NEURONTIN) 100 mg capsule 100 mg. 6/19/17  Yes Historical Provider   dilTIAZem (CARDIZEM) 30 mg tablet 30 mg. Historical Provider   amiodarone (CORDARONE) 200 mg tablet Take 1 Tab by mouth daily. Indications: VENTRICULAR RATE CONTROL IN ATRIAL FIBRILLATION 7/13/17   Blaine Dixon MD   clopidogrel (PLAVIX) 75 mg tab Take 1 Tab by mouth daily. 7/13/17   Gerard Joseph MD   oxyCODONE-acetaminophen (PERCOCET) 5-325 mg per tablet Take 2 Tabs by mouth every four (4) hours as needed. Max Daily Amount: 12 Tabs. 7/10/17   JUNE Millan   gabapentin (NEURONTIN) 100 mg capsule Take 1 Cap by mouth two (2) times a day. Indications: NEUROPATHIC PAIN 6/19/17   Sade Hart MD   aspirin 81 mg chewable tablet Take 1 Tab by mouth daily (with breakfast). 6/2/17   Paz Da Silva MD   cholecalciferol (VITAMIN D3) 1,000 unit tablet Take 1 Tab by mouth daily. 6/2/17   Paz Da Silva MD   losartan (COZAAR) 25 mg tablet Take 1 Tab by mouth daily. Indications: Chronic Heart Failure, hypertension 6/2/17   Paz Da Silva MD   ascorbic acid, vitamin C, (VITAMIN C) 250 mg tablet Take 1 Tab by mouth daily (with breakfast). 6/2/17   Paz Da Silva MD   DULoxetine (CYMBALTA) 60 mg capsule Take 1 Cap by mouth daily. 6/2/17   aPz Da Silva MD   zinc sulfate (ZINCATE) 220 (50) mg capsule Take 1 Cap by mouth daily. 6/2/17   Paz Da Silva MD   ferrous sulfate 325 mg (65 mg iron) tablet Take 1 Tab by mouth daily (with breakfast). 6/2/17   Paz Da Silva MD   atorvastatin (LIPITOR) 40 mg tablet Take 1 Tab by mouth nightly. Indications: DYSLIPIDEMIA 6/2/17   Paz Da Silva MD   metoprolol tartrate (LOPRESSOR) 25 mg tablet Take 0.5 Tabs by mouth every twelve (12) hours. Indications: hypertension, VENTRICULAR RATE CONTROL IN ATRIAL FIBRILLATION 6/2/17   Katherine Covington NP   dilTIAZem (CARDIZEM) 30 mg tablet Take 1 Tab by mouth Before breakfast, lunch, dinner and at bedtime. Indications: hypertension 6/2/17   Katherine Covington NP   cyanocobalamin (VITAMIN B-12) 1,000 mcg tablet Take 1 Tab by mouth daily.  10/6/16   Paz Da Silva MD       No Known Allergies    Review of Systems  Constitutional: positive for fatigue, diaphoresis (resolved)  HEENT: negative   Respiratory: positive for SOB (improved)  Cardiovascular: negative   Gastrointestinal: negative   Genitourinary:negative   Hematologic/lymphatic: negative   Musculoskeletal:positive for open left groin wound  Neurological: negative   Behavioral/Psych: negative   Endocrine: negative   Allergic/Immunologic: negative        Physical Exam:      Visit Vitals    /70    Pulse 73    Temp 98.8 °F (37.1 °C)    Resp 24    Ht 5' 10\" (1.778 m)    Wt 170 lb 10.2 oz (77.4 kg)    SpO2 99%    BMI 24.48 kg/m2       Physical Exam:  General: Well-appearing male in no acute distress   HEENT: EOMI, no scleral icterus is noted. Cardiovascular: RRR   Pulmonary: No increased work or breathing is noted. Abdomen:soft, NT, nondistended. No rebound or guarding is noted. Extremities: feet warm to touch, no BLE edema. Left thigh incision with minimal erythema, intact but does have some scant purulent drainage. Left groin incision dehisced with exposed graft, mild purulent drainage on dressing. Neuro: Cranial nerves II through XII are grossly intact     Data Review:    CBC:   Lab Results   Component Value Date/Time    WBC 14.1 07/13/2017 12:00 AM    RBC 3.34 07/13/2017 12:00 AM    HGB 9.7 07/13/2017 12:00 AM    HCT 30.0 07/13/2017 12:00 AM    PLATELET 916 38/20/8781 12:00 AM      BMP:   Lab Results   Component Value Date/Time    Glucose 95 07/08/2017 11:40 AM    Sodium 134 07/08/2017 11:40 AM    Potassium 4.5 07/08/2017 11:40 AM    Chloride 96 07/08/2017 11:40 AM    CO2 30 07/08/2017 11:40 AM    BUN 9 07/08/2017 11:40 AM    Creatinine 0.65 07/08/2017 11:40 AM    Calcium 9.0 07/08/2017 11:40 AM     Coagulation:   Lab Results   Component Value Date/Time    Prothrombin time 13.0 07/04/2017 11:00 AM    INR 1.0 07/04/2017 11:00 AM    aPTT 33.3 07/08/2017 11:40 AM         Assessment/Plan     60 yo male with MMP and complicated vascular history with multiple bypass procedures with PTFE graft. Patient now presents with exposed left groin graft and severe sepsis. WBCs improved with ABX. Continue broad spectrum ABX, cultures sent. Will consult ID. ECHO from [address] shows right heart failure. Patient with history of [address]. Cardiology consulted. Will likely need removal of infected PTFE graft and possible bilateral AKA. Further surgical discussion in AM with MD. Keep NPO after midnight.      Active Problems:    Sepsis ([address] (7/24/2017)        [address], [place]  July 24, 2017 No

## 2022-01-06 ENCOUNTER — APPOINTMENT (OUTPATIENT)
Dept: GENERAL RADIOLOGY | Age: 62
End: 2022-01-06
Attending: SURGERY
Payer: COMMERCIAL

## 2022-01-06 ENCOUNTER — ANESTHESIA (OUTPATIENT)
Dept: CARDIOTHORACIC SURGERY | Age: 62
End: 2022-01-06
Payer: COMMERCIAL

## 2022-01-06 ENCOUNTER — HOSPITAL ENCOUNTER (OUTPATIENT)
Age: 62
Setting detail: OUTPATIENT SURGERY
Discharge: HOME | End: 2022-01-06
Attending: SURGERY | Admitting: SURGERY
Payer: COMMERCIAL

## 2022-01-06 ENCOUNTER — ANESTHESIA EVENT (OUTPATIENT)
Dept: CARDIOTHORACIC SURGERY | Age: 62
End: 2022-01-06
Payer: COMMERCIAL

## 2022-01-06 VITALS
OXYGEN SATURATION: 98 % | WEIGHT: 180 LBS | SYSTOLIC BLOOD PRESSURE: 105 MMHG | HEART RATE: 72 BPM | TEMPERATURE: 97.1 F | RESPIRATION RATE: 16 BRPM | DIASTOLIC BLOOD PRESSURE: 64 MMHG | BODY MASS INDEX: 25.83 KG/M2

## 2022-01-06 DIAGNOSIS — I70.201 OCCLUSION OF RIGHT FEMORAL ARTERY (HCC): Primary | ICD-10-CM

## 2022-01-06 LAB
COVID-19 RAPID TEST, COVR: NOT DETECTED
SOURCE, COVRS: NORMAL

## 2022-01-06 PROCEDURE — 77030002996 HC SUT SLK J&J -A: Performed by: SURGERY

## 2022-01-06 PROCEDURE — 87635 SARS-COV-2 COVID-19 AMP PRB: CPT

## 2022-01-06 PROCEDURE — 76210000026 HC REC RM PH II 1 TO 1.5 HR: Performed by: SURGERY

## 2022-01-06 PROCEDURE — 74011000250 HC RX REV CODE- 250: Performed by: SURGERY

## 2022-01-06 PROCEDURE — 74011000636 HC RX REV CODE- 636: Performed by: SURGERY

## 2022-01-06 PROCEDURE — 76210000006 HC OR PH I REC 0.5 TO 1 HR: Performed by: SURGERY

## 2022-01-06 PROCEDURE — 74011250636 HC RX REV CODE- 250/636: Performed by: ANESTHESIOLOGY

## 2022-01-06 PROCEDURE — 74011250636 HC RX REV CODE- 250/636: Performed by: SURGERY

## 2022-01-06 PROCEDURE — 77030018836 HC SOL IRR NACL ICUM -A: Performed by: SURGERY

## 2022-01-06 PROCEDURE — 77030002987 HC SUT PROL J&J -B: Performed by: SURGERY

## 2022-01-06 PROCEDURE — 77030002986 HC SUT PROL J&J -A: Performed by: SURGERY

## 2022-01-06 PROCEDURE — 77030011265 HC ELECTRD BLD HEX COVD -A: Performed by: SURGERY

## 2022-01-06 PROCEDURE — 74011000250 HC RX REV CODE- 250: Performed by: ANESTHESIOLOGY

## 2022-01-06 PROCEDURE — 77030002933 HC SUT MCRYL J&J -A: Performed by: SURGERY

## 2022-01-06 PROCEDURE — 77030003390 HC NDL ANGI MRTM -A: Performed by: SURGERY

## 2022-01-06 PROCEDURE — 2709999900 HC NON-CHARGEABLE SUPPLY: Performed by: SURGERY

## 2022-01-06 PROCEDURE — 77030040922 HC BLNKT HYPOTHRM STRY -A: Performed by: SURGERY

## 2022-01-06 PROCEDURE — 01922 ANES N-INVAS IMG/RADJ THER: CPT | Performed by: ANESTHESIOLOGY

## 2022-01-06 PROCEDURE — C1894 INTRO/SHEATH, NON-LASER: HCPCS | Performed by: SURGERY

## 2022-01-06 PROCEDURE — 76010000112 HC CV SURG 0.5 TO 1 HR: Performed by: SURGERY

## 2022-01-06 PROCEDURE — 77030010507 HC ADH SKN DERMBND J&J -B: Performed by: SURGERY

## 2022-01-06 PROCEDURE — 77030040830 HC CATH URETH FOL MDII -A: Performed by: SURGERY

## 2022-01-06 PROCEDURE — 76060000032 HC ANESTHESIA 0.5 TO 1 HR: Performed by: SURGERY

## 2022-01-06 PROCEDURE — 77030040361 HC SLV COMPR DVT MDII -B: Performed by: SURGERY

## 2022-01-06 RX ORDER — FAMOTIDINE 20 MG/1
20 TABLET, FILM COATED ORAL ONCE
Status: DISCONTINUED | OUTPATIENT
Start: 2022-01-06 | End: 2022-01-06 | Stop reason: HOSPADM

## 2022-01-06 RX ORDER — KETAMINE HCL 50MG/ML(1)
SYRINGE (ML) INTRAVENOUS AS NEEDED
Status: DISCONTINUED | OUTPATIENT
Start: 2022-01-06 | End: 2022-01-06 | Stop reason: HOSPADM

## 2022-01-06 RX ORDER — SODIUM CHLORIDE 0.9 % (FLUSH) 0.9 %
5-40 SYRINGE (ML) INJECTION EVERY 8 HOURS
Status: DISCONTINUED | OUTPATIENT
Start: 2022-01-06 | End: 2022-01-06 | Stop reason: HOSPADM

## 2022-01-06 RX ORDER — ONDANSETRON 2 MG/ML
4 INJECTION INTRAMUSCULAR; INTRAVENOUS ONCE
Status: DISCONTINUED | OUTPATIENT
Start: 2022-01-06 | End: 2022-01-06 | Stop reason: HOSPADM

## 2022-01-06 RX ORDER — FENTANYL CITRATE 50 UG/ML
INJECTION, SOLUTION INTRAMUSCULAR; INTRAVENOUS AS NEEDED
Status: DISCONTINUED | OUTPATIENT
Start: 2022-01-06 | End: 2022-01-06 | Stop reason: HOSPADM

## 2022-01-06 RX ORDER — SODIUM CHLORIDE 0.9 % (FLUSH) 0.9 %
5-40 SYRINGE (ML) INJECTION AS NEEDED
Status: DISCONTINUED | OUTPATIENT
Start: 2022-01-06 | End: 2022-01-06 | Stop reason: HOSPADM

## 2022-01-06 RX ORDER — MIDAZOLAM HYDROCHLORIDE 1 MG/ML
INJECTION, SOLUTION INTRAMUSCULAR; INTRAVENOUS AS NEEDED
Status: DISCONTINUED | OUTPATIENT
Start: 2022-01-06 | End: 2022-01-06 | Stop reason: HOSPADM

## 2022-01-06 RX ORDER — HYDROCODONE BITARTRATE AND ACETAMINOPHEN 5; 325 MG/1; MG/1
1 TABLET ORAL AS NEEDED
Status: DISCONTINUED | OUTPATIENT
Start: 2022-01-06 | End: 2022-01-06 | Stop reason: HOSPADM

## 2022-01-06 RX ORDER — HEPARIN SODIUM 200 [USP'U]/100ML
INJECTION, SOLUTION INTRAVENOUS
Status: COMPLETED | OUTPATIENT
Start: 2022-01-06 | End: 2022-01-06

## 2022-01-06 RX ORDER — IODIXANOL 320 MG/ML
INJECTION, SOLUTION INTRAVASCULAR AS NEEDED
Status: DISCONTINUED | OUTPATIENT
Start: 2022-01-06 | End: 2022-01-06 | Stop reason: HOSPADM

## 2022-01-06 RX ORDER — FLUMAZENIL 0.1 MG/ML
0.2 INJECTION INTRAVENOUS
Status: DISCONTINUED | OUTPATIENT
Start: 2022-01-06 | End: 2022-01-06 | Stop reason: HOSPADM

## 2022-01-06 RX ORDER — CEFAZOLIN SODIUM 1 G/3ML
INJECTION, POWDER, FOR SOLUTION INTRAMUSCULAR; INTRAVENOUS AS NEEDED
Status: DISCONTINUED | OUTPATIENT
Start: 2022-01-06 | End: 2022-01-06 | Stop reason: HOSPADM

## 2022-01-06 RX ORDER — HEPARIN SODIUM 200 [USP'U]/100ML
INJECTION, SOLUTION INTRAVENOUS
Status: DISCONTINUED
Start: 2022-01-06 | End: 2022-01-06 | Stop reason: HOSPADM

## 2022-01-06 RX ORDER — HEPARIN SODIUM 1000 [USP'U]/ML
INJECTION, SOLUTION INTRAVENOUS; SUBCUTANEOUS AS NEEDED
Status: DISCONTINUED | OUTPATIENT
Start: 2022-01-06 | End: 2022-01-06 | Stop reason: HOSPADM

## 2022-01-06 RX ORDER — SODIUM CHLORIDE, SODIUM LACTATE, POTASSIUM CHLORIDE, CALCIUM CHLORIDE 600; 310; 30; 20 MG/100ML; MG/100ML; MG/100ML; MG/100ML
50 INJECTION, SOLUTION INTRAVENOUS CONTINUOUS
Status: DISCONTINUED | OUTPATIENT
Start: 2022-01-06 | End: 2022-01-06 | Stop reason: HOSPADM

## 2022-01-06 RX ORDER — SODIUM CHLORIDE, SODIUM LACTATE, POTASSIUM CHLORIDE, CALCIUM CHLORIDE 600; 310; 30; 20 MG/100ML; MG/100ML; MG/100ML; MG/100ML
125 INJECTION, SOLUTION INTRAVENOUS CONTINUOUS
Status: DISCONTINUED | OUTPATIENT
Start: 2022-01-06 | End: 2022-01-06 | Stop reason: HOSPADM

## 2022-01-06 RX ORDER — LIDOCAINE HYDROCHLORIDE 20 MG/ML
INJECTION, SOLUTION EPIDURAL; INFILTRATION; INTRACAUDAL; PERINEURAL AS NEEDED
Status: DISCONTINUED | OUTPATIENT
Start: 2022-01-06 | End: 2022-01-06 | Stop reason: HOSPADM

## 2022-01-06 RX ORDER — PROPOFOL 10 MG/ML
VIAL (ML) INTRAVENOUS
Status: DISCONTINUED | OUTPATIENT
Start: 2022-01-06 | End: 2022-01-06 | Stop reason: HOSPADM

## 2022-01-06 RX ORDER — HYDROMORPHONE HYDROCHLORIDE 1 MG/ML
0.2 INJECTION, SOLUTION INTRAMUSCULAR; INTRAVENOUS; SUBCUTANEOUS AS NEEDED
Status: DISCONTINUED | OUTPATIENT
Start: 2022-01-06 | End: 2022-01-06 | Stop reason: HOSPADM

## 2022-01-06 RX ORDER — LIDOCAINE HYDROCHLORIDE 10 MG/ML
INJECTION, SOLUTION EPIDURAL; INFILTRATION; INTRACAUDAL; PERINEURAL
Status: DISCONTINUED
Start: 2022-01-06 | End: 2022-01-06 | Stop reason: HOSPADM

## 2022-01-06 RX ORDER — MAGNESIUM SULFATE 100 %
4 CRYSTALS MISCELLANEOUS AS NEEDED
Status: DISCONTINUED | OUTPATIENT
Start: 2022-01-06 | End: 2022-01-06 | Stop reason: HOSPADM

## 2022-01-06 RX ORDER — HYDROCODONE BITARTRATE AND ACETAMINOPHEN 5; 325 MG/1; MG/1
1 TABLET ORAL
Qty: 40 TABLET | Refills: 0 | Status: SHIPPED | OUTPATIENT
Start: 2022-01-06 | End: 2022-01-13

## 2022-01-06 RX ORDER — HYDROMORPHONE HYDROCHLORIDE 1 MG/ML
0.5 INJECTION, SOLUTION INTRAMUSCULAR; INTRAVENOUS; SUBCUTANEOUS
Status: DISCONTINUED | OUTPATIENT
Start: 2022-01-06 | End: 2022-01-06 | Stop reason: HOSPADM

## 2022-01-06 RX ORDER — NALOXONE HYDROCHLORIDE 0.4 MG/ML
0.2 INJECTION, SOLUTION INTRAMUSCULAR; INTRAVENOUS; SUBCUTANEOUS AS NEEDED
Status: DISCONTINUED | OUTPATIENT
Start: 2022-01-06 | End: 2022-01-06 | Stop reason: HOSPADM

## 2022-01-06 RX ORDER — IODIXANOL 320 MG/ML
INJECTION, SOLUTION INTRAVASCULAR
Status: DISCONTINUED
Start: 2022-01-06 | End: 2022-01-06 | Stop reason: HOSPADM

## 2022-01-06 RX ORDER — LIDOCAINE HYDROCHLORIDE 10 MG/ML
INJECTION, SOLUTION EPIDURAL; INFILTRATION; INTRACAUDAL; PERINEURAL AS NEEDED
Status: DISCONTINUED | OUTPATIENT
Start: 2022-01-06 | End: 2022-01-06 | Stop reason: HOSPADM

## 2022-01-06 RX ORDER — DIPHENHYDRAMINE HYDROCHLORIDE 50 MG/ML
12.5 INJECTION, SOLUTION INTRAMUSCULAR; INTRAVENOUS
Status: DISCONTINUED | OUTPATIENT
Start: 2022-01-06 | End: 2022-01-06 | Stop reason: HOSPADM

## 2022-01-06 RX ORDER — INSULIN LISPRO 100 [IU]/ML
INJECTION, SOLUTION INTRAVENOUS; SUBCUTANEOUS AS NEEDED
Status: DISCONTINUED | OUTPATIENT
Start: 2022-01-06 | End: 2022-01-06 | Stop reason: HOSPADM

## 2022-01-06 RX ORDER — DEXTROSE 50 % IN WATER (D50W) INTRAVENOUS SYRINGE
25-50 AS NEEDED
Status: DISCONTINUED | OUTPATIENT
Start: 2022-01-06 | End: 2022-01-06 | Stop reason: HOSPADM

## 2022-01-06 RX ADMIN — FENTANYL CITRATE 50 MCG: 50 INJECTION, SOLUTION INTRAMUSCULAR; INTRAVENOUS at 10:11

## 2022-01-06 RX ADMIN — SODIUM CHLORIDE, SODIUM LACTATE, POTASSIUM CHLORIDE, AND CALCIUM CHLORIDE: 600; 310; 30; 20 INJECTION, SOLUTION INTRAVENOUS at 10:06

## 2022-01-06 RX ADMIN — Medication 25 MG: at 10:23

## 2022-01-06 RX ADMIN — LIDOCAINE HYDROCHLORIDE 40 MG: 20 INJECTION, SOLUTION EPIDURAL; INFILTRATION; INTRACAUDAL; PERINEURAL at 10:13

## 2022-01-06 RX ADMIN — PROPOFOL 75 MCG/KG/MIN: 10 INJECTION, EMULSION INTRAVENOUS at 10:13

## 2022-01-06 RX ADMIN — CEFAZOLIN 2 G: 330 INJECTION, POWDER, FOR SOLUTION INTRAMUSCULAR; INTRAVENOUS at 10:16

## 2022-01-06 RX ADMIN — FENTANYL CITRATE 50 MCG: 50 INJECTION, SOLUTION INTRAMUSCULAR; INTRAVENOUS at 10:19

## 2022-01-06 RX ADMIN — HEPARIN SODIUM 3000 UNITS: 1000 INJECTION, SOLUTION INTRAVENOUS; SUBCUTANEOUS at 10:38

## 2022-01-06 RX ADMIN — MIDAZOLAM HYDROCHLORIDE 2 MG: 2 INJECTION, SOLUTION INTRAMUSCULAR; INTRAVENOUS at 10:11

## 2022-01-06 NOTE — DISCHARGE INSTRUCTIONS
Patient Education     Eliza JIMENEZ 1/6/22       Angiogram: What to Expect at Home  Your Recovery  An angiogram is an X-ray test that uses dye and a camera to take pictures of the blood flow in an artery or a vein. The doctor inserted a thin, flexible tube (catheter) into a blood vessel in your groin. In some cases, the catheter is placed in a blood vessel in the arm. An angiogram is done for many reasons. For example, you may have an angiogram to find the source of bleeding, such as an ulcer. Or it may be done to look for blocked blood vessels in your lungs. After an angiogram, your groin or arm may have a bruise and feel sore for a day or two. You can do light activities around the house but nothing strenuous for several days. Your doctor may give you specific instructions on when you can do your normal activities again, such as driving and going back to work. This care sheet gives you a general idea about how long it will take for you to recover. But each person recovers at a different pace. Follow the steps below to feel better as quickly as possible. How can you care for yourself at home? Activity    · Do not do strenuous exercise and do not lift, pull, or push anything heavy until your doctor says it is okay. This may be for a day or two. You can walk around the house and do light activity, such as cooking.     · If the catheter was placed in your groin, try not to walk up stairs for the first couple of days.     · If the catheter was placed in your arm near your wrist, do not bend your wrist deeply for the first couple of days. Be careful using your hand to get into and out of a chair or bed.     · If your doctor recommends it, get more exercise. Walking is a good choice. Bit by bit, increase the amount you walk every day. Try for at least 30 minutes on most days of the week. Diet    · Drink plenty of fluids to help your body flush out the dye.  If you have kidney, heart, or liver disease and have to limit fluids, talk with your doctor before you increase the amount of fluids you drink.     · You can eat your normal diet. If your stomach is upset, try bland, low-fat foods like plain rice, broiled chicken, toast, and yogurt. Medicines    · Be safe with medicines. Read and follow all instructions on the label. ? If the doctor gave you a prescription medicine for pain, take it as prescribed. ? If you are not taking a prescription pain medicine, ask your doctor if you can take an over-the-counter medicine.     · If you take aspirin or some other blood thinner, ask your doctor if and when to start taking it again. Make sure that you understand exactly what your doctor wants you to do.     · Your doctor will tell you if and when you can restart your medicines. He or she will also give you instructions about taking any new medicines. Care of the catheter site    · You will have a dressing over the cut (incision). A dressing helps the incision heal and protects it. Your doctor will tell you how to take care of this.     · Put ice or a cold pack on the area for 10 to 20 minutes at a time to help with soreness or swelling. Put a thin cloth between the ice and your skin.     · You may shower 24 to 48 hours after the procedure, if your doctor okays it. Pat the incision dry.     · Do not soak the catheter site until it is healed. Don't take a bath for 1 week, or until your doctor tells you it is okay.     · Watch for bleeding from the site. A small amount of blood (up to the size of a quarter) on the bandage can be normal.     · If you are bleeding, lie down and press on the area for 15 minutes to try to make it stop. If the bleeding does not stop, call your doctor or seek immediate medical care. Follow-up care is a key part of your treatment and safety. Be sure to make and go to all appointments, and call your doctor if you are having problems.  It's also a good idea to know your test results and keep a list of the medicines you take. When should you call for help? Call 911 anytime you think you may need emergency care. For example, call if:    · You passed out (lost consciousness).     · You have severe trouble breathing.     · You have sudden chest pain and shortness of breath, or you cough up blood. Call your doctor now or seek immediate medical care if:    · You are bleeding from the area where the catheter was put in your artery.     · You have a fast-growing, painful lump at the catheter site.     · You have signs of infection, such as:  ? Increased pain, swelling, warmth, or redness. ? Red streaks leading from the incision. ? Pus draining from the incision. ? A fever. Watch closely for any changes in your health, and be sure to contact your doctor if:    · You don't get better as expected. Where can you learn more? Go to http://www.gray.com/  Enter B340210 in the search box to learn more about \"Angiogram: What to Expect at Home. \"  Current as of: June 17, 2021               Content Version: 13.0  © 5925-7836 [employer]. Care instructions adapted under license by [employer] (which disclaims liability or warranty for this information). If you have questions about a medical condition or this instruction, always ask your healthcare professional. [address] any warranty or liability for your use of this information. DISCHARGE SUMMARY from Nurse    PATIENT INSTRUCTIONS:    After general anesthesia or intravenous sedation, for 24 hours or while taking prescription Narcotics:  · Limit your activities  · Do not drive and operate hazardous machinery  · Do not make important personal or business decisions  · Do  not drink alcoholic beverages  · If you have not urinated within 8 hours after discharge, please contact your surgeon on call.     Report the following to your surgeon:  · Excessive pain, swelling, redness or odor of or around the surgical area  · Temperature over 100.5  · Nausea and vomiting lasting longer than 4 hours or if unable to take medications  · Any signs of decreased circulation or nerve impairment to extremity: change in color, persistent  numbness, tingling, coldness or increase pain  · Any questions        These are general instructions for a healthy lifestyle:    No smoking/ No tobacco products/ Avoid exposure to second hand smoke  Surgeon General's Warning:  Quitting smoking now greatly reduces serious risk to your health. Obesity, smoking, and sedentary lifestyle greatly increases your risk for illness    A healthy diet, regular physical exercise & weight monitoring are important for maintaining a healthy lifestyle    You may be retaining fluid if you have a history of heart failure or if you experience any of the following symptoms:  Weight gain of 3 pounds or more overnight or 5 pounds in a week, increased swelling in our hands or feet or shortness of breath while lying flat in bed. Please call your doctor as soon as you notice any of these symptoms; do not wait until your next office visit. The discharge information has been reviewed with the patient. The patient verbalized understanding. Discharge medications reviewed with the patient and appropriate educational materials and side effects teaching were provided.   ___________________________________________________________________________________________________________________________________

## 2022-01-06 NOTE — ANESTHESIA PREPROCEDURE EVALUATION
Anesthetic History   No history of anesthetic complications            Review of Systems / Medical History  Patient summary reviewed and pertinent labs reviewed    Pulmonary  Within defined limits                 Neuro/Psych   Within defined limits          Comments: 02/2021  The right ICA is occluded  The right vertebral is antegrade  There is mild stenosis in the left ICA (<50%) and at the proximal segment  The left vertebral is occluded.  Cardiovascular    Hypertension        Dysrhythmias (paroxysmal A fib) : atrial fibrillation  CAD, PAD, cardiac stents (2017) and hyperlipidemia      Comments: dilated cardiomyopathy    11/2020 stress  Left ventricular perfusion is probably normal. Myocardial perfusion imaging supports a low risk stress test    04/2021 ECHO  Estimated LVEF is 55 - 60%   GI/Hepatic/Renal  Within defined limits              Endo/Other      Hypothyroidism  Arthritis     Other Findings   Comments: Marijuana    HX ABOVE KNEE AMPUTATION            Physical Exam    Airway  Mallampati: II  TM Distance: 4 - 6 cm  Neck ROM: normal range of motion   Mouth opening: Normal    Comments: Facial hair Cardiovascular    Rhythm: regular           Dental    Dentition: Edentulous     Pulmonary  Breath sounds clear to auscultation               Abdominal  GI exam deferred       Other Findings            Anesthetic Plan    ASA: 3  Anesthesia type: MAC          Induction: Intravenous  Anesthetic plan and risks discussed with: Patient

## 2022-01-06 NOTE — ANESTHESIA POSTPROCEDURE EVALUATION
Procedure(s):  RIGHT LEG ANGIOGRAM.    MAC    Anesthesia Post Evaluation      Multimodal analgesia: multimodal analgesia used between 6 hours prior to anesthesia start to PACU discharge  Patient location during evaluation: PACU  Patient participation: complete - patient participated  Level of consciousness: awake and alert  Pain management: adequate  Airway patency: patent  Anesthetic complications: no  Cardiovascular status: acceptable  Respiratory status: acceptable  Hydration status: acceptable  Post anesthesia nausea and vomiting:  controlled  Final Post Anesthesia Temperature Assessment:  Normothermia (36.0-37.5 degrees C)      INITIAL Post-op Vital signs:   Vitals Value Taken Time   BP 92/57 01/06/22 1133   Temp 36.4 °C (97.6 °F) 01/06/22 1107   Pulse 71 01/06/22 1142   Resp 16 01/06/22 1142   SpO2 99 % 01/06/22 1142   Vitals shown include unvalidated device data.

## 2022-01-06 NOTE — H&P
Surgery History and Physical    Subjective: Adrianna Peña is a 59 y.o.  male who presents with complex PAD.     Patient Active Problem List    Diagnosis Date Noted    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[address] 03/31/2021    Skin ulcer of malleolar area of right ankle ([address]     Prolonged Q-T interval on ECG 08/19/2017    Adverse effect of amiodarone 08/19/2017    Status post above-knee amputation of left lower extremity ([address] 08/18/2017    Aftercare for amputation stump 08/18/2017    Ischemic rest pain of lower extremity 08/14/2017    Chronic ulcer of right heel ([address] 08/08/2017    Chronic anemia     Acute blood loss as cause of postoperative anemia 07/25/2017    Aftercare following surgery of the circulatory system 07/25/2017    Impaired mobility and ADLs 07/24/2017    Infection of vascular bypass graft ([address] 07/24/2017    Moderate to severe pulmonary hypertension ([address] 07/23/2017    Pseudoaneurysm of femoral artery ([address] 06/27/2017    History of vitamin D deficiency 04/22/2017    Status post femoral-popliteal bypass surgery 04/21/2017    Ischemic cardiomyopathy     Chronic systolic heart failure (HCC)     Carotid artery disease ([address]     Dyslipidemia     History of cardioversion 04/18/2017    Paroxysmal atrial fibrillation ([address]     Critical ischemia of lower extremity ([address] 04/10/2017    Euthyroid sick syndrome 04/06/2017    Coronary artery disease involving native coronary artery of native heart 03/15/2017    Aortoiliac occlusive disease ([address] 01/25/2017    Atherosclerosis of native artery of both lower extremities with intermittent claudication ([address] 01/25/2017    Peripheral artery disease ([address] 01/25/2017    Hereditary peripheral neuropathy 11/15/2016    Erectile dysfunction 07/05/2016    Spinal stenosis of lumbar region with radiculopathy 05/04/2015    Benign hypertensive heart disease with systolic congestive heart failure, NYHA class 2 ([address] 01/26/2015    DDD (degenerative disc disease), lumbar 01/26/2015     Past Medical History:   Diagnosis Date    Acute blood loss as cause of postoperative anemia 4/4/2017    Anticoagulated on Coumadin     Aortoiliac occlusive disease ([address] 1/25/2017    Atherosclerosis of native artery of both lower extremities with intermittent claudication ([address] 1/25/2017    Benign hypertensive heart disease with systolic congestive heart failure, NYHA class 2 ([address] 1/26/2015    Carotid artery disease (HCC)     Chronic anemia     Chronic systolic heart failure (HCC)     Chronic ulcer of right foot ([address] 4/4/2017    Chronic ulcer of right heel ([address] 8/8/2017    Coronary artery disease involving native coronary artery of native heart 3/15/2017    Successful stenting of Cx (Xience LESLEY) and RCA (Xience LESLEY) to 0% by Dr. Yessica Mcdaniel on 3/15/17.     DDD (degenerative disc disease), lumbar 1/26/2015    Dyslipidemia     Erectile dysfunction 7/5/2016    Euthyroid sick syndrome 4/6/2017    Hereditary peripheral neuropathy 11/15/2016    History of cardioversion 4/18/2017    S/P Synchronized external cardioversion (4/18/2017 - Dr. Malik Newman)    History of vitamin D deficiency 4/22/2017    Vitamin D 25-Hydroxy (4/22/2017) = 12.0; Vitamin D 25-Hydroxy (5/26/2017) = 38.7    Infection of vascular bypass graft ([address] 7/24/2017    Left lower extremity    Ischemic cardiomyopathy     Moderate to severe pulmonary hypertension ([address] 7/23/2017    Paroxysmal atrial fibrillation (HCC)     Peripheral artery disease ([address] 1/25/2017    Skin ulcer of malleolar area of right ankle ([address]     Spinal stenosis of lumbar region with radiculopathy 5/4/2015    Dr. Wolfgang Moore      Past Surgical History:   Procedure Laterality Date    CARDIAC CATHETERIZATION  3/8/2017         CARDIAC CATHETERIZATION  3/15/2017         CORONARY STENT SINGLE W/PTCA  3/15/2017         HX ABOVE KNEE AMPUTATION Left 08/18/2017    S/P Left above-the-knee amputation (8/18/2017 - Dr. Henrique Crawley)    HX ATHERECTOMY Left 06/15/2017    S/P Atherectomy and balloon angioplasty of the left superficial femoral artery and above-knee popliteal artery (6/15/2017 - Dr. Henrique Crawley)    HX ATHERECTOMY Right 09/07/2017    S/P Atherectomy and balloon angioplasty of the below-the-knee popliteal artery, above-the-knee popliteal artery, tibioperoneal trunk artery and posterior tibial artery (9/7/2017 - Dr. Henrique Crawley)    HX COLONOSCOPY  07/23/2015    polyps repeat 2020 5 years [address] Right 04/04/2017    S/P Right axillary to bifemoral artery bypass using an 8 mm Propaten graft from the right axilla to the right common femoral artery with a jump femoral-femoral bypass with another 8 mm Propaten external ring bypass; Right common femoral artery, and profunda femoris artery and superficial femoral artery endarterectomy causing an extensive surgery on the right side (4/4/2017 - Dr. Yifan Sherman)    [address] Right 04/07/2017    S/P Cutdown of femoral-femoral bypass, more on the left groin side; Right lower extremity angiography with first-order catheterization (4/7/2017 - Dr. Henrique Crawley)    HX FEMORAL BYPASS Right 04/10/2017    S/P Right femoral to above-knee popliteal bypass with an 8 mm PTFE graft (4/10/2017 - Dr. Handy Lee)    HX OTHER SURGICAL Left 07/25/2017    S/P Removal of infected graft; Repair of left superficial femoral artery; Repair of left common femoral artery (7/25/2017 - Dr. Henrique Crawley)    HX OTHER SURGICAL Right 06/27/2017    S/P Drainage of right side abscess (6/27/2017 - Dr. Henrique Crawley)    HX OTHER SURGICAL Left 07/01/2017    S/P Left groin exploration; Left femoral repair pseudoaneurysm; Left wound washout;  Wound VAC placement (7/01/2017 - Dr. Henrique Crawley)    HX OTHER SURGICAL Left 07/04/2017    S/P Left common femoral artery ligation; Jump bypass from right to left fem-fem to a more distal superficial femoral artery using 8 mm external ringed Propaten graft; Left groin wound exploration and washout (7/4/2017 - Dr. Marla Ramirez)    HX OTHER SURGICAL Right 08/18/2017    S/P Right axillary femoral jump bypass interposition; Removal of infected right axillary femoral bypass; Wound VAC placement of upper thigh 1.2 cm x 5.2 cm x 1.8 cm and groin 4 cm x 0.5 cm x 0.2 cm (8/18/2017 - Dr. Marla Ramirez)      Social History     Tobacco Use    Smoking status: Former Smoker    Smokeless tobacco: Never Used    Tobacco comment: quit in 2011   Substance Use Topics    Alcohol use: Yes     Alcohol/week: 2.0 standard drinks     Types: 2 Cans of beer per week     Comment: 10 cans of beer a month      Family History   Problem Relation Age of Onset    Heart Disease Father       Prior to Admission medications    Medication Sig Start Date End Date Taking? Authorizing Provider   dilTIAZem ER (CARDIZEM CD) 120 mg capsule Take 1 Capsule by mouth nightly. 12/7/21  Yes Fox Verduzco, BEAR   dilTIAZem ER (CARDIZEM CD) 180 mg capsule Take 1 tablet by mouth once every morning. 12/7/21  Yes Corey HODGES NP   furosemide (LASIX) 20 mg tablet Take 1 Tablet by mouth daily. 11/23/21  Yes Mio Barajas MD   metoprolol tartrate (LOPRESSOR) 50 mg tablet TAKE 1 TABLET BY MOUTH TWO (2) TIMES A DAY. 10/29/21  Yes Mio Barajas MD   losartan (COZAAR) 100 mg tablet Take 1 Tablet by mouth daily. 10/7/21  Yes Mio Barajas MD   atorvastatin (LIPITOR) 80 mg tablet TAKE 1 TABLET ONCE DAILY 10/7/21  Yes Mio Barajas MD   aspirin 81 mg chewable tablet Take 1 Tab by mouth daily. 4/10/20  Yes Mio Barajas MD   gabapentin (NEURONTIN) 400 mg capsule take 1 capsule by mouth three times a day 3/29/18  Yes Mio Barajas MD   multivitamin (ONE A DAY) tablet Take 1 Tab by mouth daily. Yes Provider, Historical   rivaroxaban (Xarelto) 20 mg tab tablet Take 1 Tablet by mouth daily. 21   Neli Fairchild NP     Allergies   Allergen Reactions    Morphine Other (comments)     Patient gets confused with morphine. Review of Systems:    A comprehensive review of systems was negative except for that written in the History of Present Illness. Objective:     Patient Vitals for the past 8 hrs:   BP Temp Pulse Resp SpO2   22 0903 95/65 98.8 °F (37.1 °C) 86 20 99 %       Temp (24hrs), Av.8 °F (37.1 °C), Min:98.8 °F (37.1 °C), Max:98.8 °F (37.1 °C)      Physical Exam:  LUNG: clear to auscultation bilaterally, HEART: S1, S2 normal, ABDOMEN: soft, non-tender. Bowel sounds normal. No masses,  no organomegaly. Right foot looks better today more viable and warm    Labs:   Recent Results (from the past 24 hour(s))   COVID-19 RAPID TEST    Collection Time: 22  8:37 AM   Result Value Ref Range    Specimen source Nasopharyngeal      COVID-19 rapid test Not detected NOTD         Data Review:    BMP:   Lab Results   Component Value Date/Time    Glucose 130 (H) 2021 06:30 AM    Sodium 140 2021 06:30 AM    Potassium 4.0 2021 06:30 AM    Chloride 110 2021 06:30 AM    CO2 25 2021 06:30 AM    BUN 11 2021 06:30 AM    Creatinine 0.89 2021 06:30 AM    Calcium 8.7 2021 06:30 AM       Assessment:     Active Problems:    * No active hospital problems.  *      Plan:     Angiogram right leg    Signed By: Brigida Vann MD     2022

## 2022-01-06 NOTE — PERIOP NOTES
Healing right groin incisional line with mild redness. S/P peripheral graft surgery from appoximately one month ago.

## 2022-01-18 NOTE — OP NOTES
Preoperative diagnosis: PAD with occluded femoral bypass right leg    Postoperative diagnosis: Same    Procedures performed:  #1  Open exposure right axillofemoral bypass graft  #2  Right leg angiogram with interpretation      Cultures: None    Specimens: None    Drains: None    Estimated blood loss: Less than 50 mL    Assistants: None    Implants: Please see above    Complications: None    Anesthesia: Moderate conscious sedation    Indications for the procedure: Jhoan Mares is a 59 y.o. complex vascular history with axillofemoral femorofemoral femoropopliteal bypass. Patient was given the appropriate risk and benefits of the procedure including but not limited to bleeding, infection, damage to adjacent structures, MI, stroke, death, loss of lower extremity, need for further surgery. Patient was understanding of all the risks and underwent a procedure. Operative findings:   #1  Axillary femoral bypasses patent. Common femoral and profunda are patent with extensive collateralization on the right side. Distal external iliac artery is patent. Superficial femoral artery is 100% occluded. Femoropopliteal bypass is 100% occluded 2 to 3 cm past its origin. Reconstitution of a small popliteal above and below the knee. Scant blind islands of anterior tibial and posterior tibial.    Procedure:  Patient was correctly identified in the pre operative area taken to the operating room in stable condition. Patient had pre-incision timeout prior to any incision. Patient was prepped and draped in the normal sterile fashion according to CDC guidelines aseptic technique. Palpated the right Exofin graft at the flank on the right localized and did a direct cutdown through a small skin incision. Placed a vessel loop around the graft is well incorporated. Punctured the graft the needle and then placed a 6 Setswana sheath. Performed right leg angiogram with findings above.   Pulled the sheath and repaired the site with a 5-0 Prolene suture. Irrigated and closed 3-0 Monocryl for subcu. 4-0 Monocryl and Dermabond glue for skin. He was transferred back to recovery in stable condition.

## 2022-02-25 ENCOUNTER — ANESTHESIA EVENT (OUTPATIENT)
Dept: CARDIOTHORACIC SURGERY | Age: 62
DRG: 240 | End: 2022-02-25
Payer: COMMERCIAL

## 2022-02-28 ENCOUNTER — HOSPITAL ENCOUNTER (INPATIENT)
Age: 62
LOS: 16 days | Discharge: HOME HEALTH | DRG: 240 | End: 2022-03-16
Attending: SURGERY | Admitting: SURGERY
Payer: COMMERCIAL

## 2022-02-28 ENCOUNTER — ANESTHESIA (OUTPATIENT)
Dept: CARDIOTHORACIC SURGERY | Age: 62
DRG: 240 | End: 2022-02-28
Payer: COMMERCIAL

## 2022-02-28 DIAGNOSIS — I48.20 CHRONIC ATRIAL FIBRILLATION (HCC): ICD-10-CM

## 2022-02-28 DIAGNOSIS — I50.20 BENIGN HYPERTENSIVE HEART DISEASE WITH SYSTOLIC CONGESTIVE HEART FAILURE, NYHA CLASS 2 (HCC): Chronic | ICD-10-CM

## 2022-02-28 DIAGNOSIS — I11.0 BENIGN HYPERTENSIVE HEART DISEASE WITH SYSTOLIC CONGESTIVE HEART FAILURE, NYHA CLASS 2 (HCC): Chronic | ICD-10-CM

## 2022-02-28 DIAGNOSIS — I73.9 PERIPHERAL ARTERY DISEASE (HCC): Chronic | ICD-10-CM

## 2022-02-28 DIAGNOSIS — I25.10 CORONARY ARTERY DISEASE INVOLVING NATIVE CORONARY ARTERY OF NATIVE HEART WITHOUT ANGINA PECTORIS: Chronic | ICD-10-CM

## 2022-02-28 PROBLEM — T82.898A: Status: ACTIVE | Noted: 2022-02-28

## 2022-02-28 LAB
ABO + RH BLD: NORMAL
ACT BLD: 166 SECS (ref 79–138)
ACT BLD: 196 SECS (ref 79–138)
ACT BLD: 208 SECS (ref 79–138)
ANION GAP SERPL CALC-SCNC: 7 MMOL/L (ref 3–18)
BLOOD GROUP ANTIBODIES SERPL: NORMAL
BUN SERPL-MCNC: 14 MG/DL (ref 7–18)
BUN/CREAT SERPL: 16 (ref 12–20)
CALCIUM SERPL-MCNC: 9.2 MG/DL (ref 8.5–10.1)
CHLORIDE SERPL-SCNC: 110 MMOL/L (ref 100–111)
CO2 SERPL-SCNC: 23 MMOL/L (ref 21–32)
COVID-19 RAPID TEST, COVR: NOT DETECTED
CREAT SERPL-MCNC: 0.89 MG/DL (ref 0.6–1.3)
ERYTHROCYTE [DISTWIDTH] IN BLOOD BY AUTOMATED COUNT: 15.9 % (ref 11.6–14.5)
GLUCOSE BLD STRIP.AUTO-MCNC: 117 MG/DL (ref 70–110)
GLUCOSE SERPL-MCNC: 96 MG/DL (ref 74–99)
HCT VFR BLD AUTO: 30.8 % (ref 36–48)
HGB BLD-MCNC: 10.2 G/DL (ref 13–16)
MCH RBC QN AUTO: 29.2 PG (ref 24–34)
MCHC RBC AUTO-ENTMCNC: 33.1 G/DL (ref 31–37)
MCV RBC AUTO: 88.3 FL (ref 78–100)
NRBC # BLD: 0 K/UL (ref 0–0.01)
NRBC BLD-RTO: 0 PER 100 WBC
PLATELET # BLD AUTO: 451 K/UL (ref 135–420)
PMV BLD AUTO: 8.8 FL (ref 9.2–11.8)
POTASSIUM SERPL-SCNC: 3.4 MMOL/L (ref 3.5–5.5)
RBC # BLD AUTO: 3.49 M/UL (ref 4.35–5.65)
SODIUM SERPL-SCNC: 140 MMOL/L (ref 136–145)
SOURCE, COVRS: NORMAL
SPECIMEN EXP DATE BLD: NORMAL
WBC # BLD AUTO: 11.7 K/UL (ref 4.6–13.2)

## 2022-02-28 PROCEDURE — 85027 COMPLETE CBC AUTOMATED: CPT

## 2022-02-28 PROCEDURE — 77030020270 HC MISC VASC IMPL: Performed by: SURGERY

## 2022-02-28 PROCEDURE — 74011250637 HC RX REV CODE- 250/637: Performed by: NURSE ANESTHETIST, CERTIFIED REGISTERED

## 2022-02-28 PROCEDURE — 77030019605: Performed by: SURGERY

## 2022-02-28 PROCEDURE — 74011250636 HC RX REV CODE- 250/636: Performed by: SURGERY

## 2022-02-28 PROCEDURE — 65620000000 HC RM CCU GENERAL

## 2022-02-28 PROCEDURE — 77030018390 HC SPNG HEMSTAT2 J&J -B: Performed by: SURGERY

## 2022-02-28 PROCEDURE — 77030002933 HC SUT MCRYL J&J -A: Performed by: SURGERY

## 2022-02-28 PROCEDURE — 77030018836 HC SOL IRR NACL ICUM -A: Performed by: SURGERY

## 2022-02-28 PROCEDURE — 74011250637 HC RX REV CODE- 250/637: Performed by: SURGERY

## 2022-02-28 PROCEDURE — 77030002987 HC SUT PROL J&J -B: Performed by: SURGERY

## 2022-02-28 PROCEDURE — 77030002986 HC SUT PROL J&J -A: Performed by: SURGERY

## 2022-02-28 PROCEDURE — 2709999900 HC NON-CHARGEABLE SUPPLY: Performed by: SURGERY

## 2022-02-28 PROCEDURE — 01270 ANES PX ARTERIES UPR LEG NOS: CPT | Performed by: ANESTHESIOLOGY

## 2022-02-28 PROCEDURE — 77030002996 HC SUT SLK J&J -A: Performed by: SURGERY

## 2022-02-28 PROCEDURE — 74011250636 HC RX REV CODE- 250/636: Performed by: NURSE ANESTHETIST, CERTIFIED REGISTERED

## 2022-02-28 PROCEDURE — 01270 ANES PX ARTERIES UPR LEG NOS: CPT | Performed by: NURSE ANESTHETIST, CERTIFIED REGISTERED

## 2022-02-28 PROCEDURE — 74011000250 HC RX REV CODE- 250: Performed by: NURSE ANESTHETIST, CERTIFIED REGISTERED

## 2022-02-28 PROCEDURE — 77030040922 HC BLNKT HYPOTHRM STRY -A: Performed by: SURGERY

## 2022-02-28 PROCEDURE — 74011000250 HC RX REV CODE- 250: Performed by: SURGERY

## 2022-02-28 PROCEDURE — 77030038692 HC WND DEB SYS IRMX -B: Performed by: SURGERY

## 2022-02-28 PROCEDURE — 77030040361 HC SLV COMPR DVT MDII -B: Performed by: SURGERY

## 2022-02-28 PROCEDURE — 77030013079 HC BLNKT BAIR HGGR 3M -A: Performed by: ANESTHESIOLOGY

## 2022-02-28 PROCEDURE — 76060000037 HC ANESTHESIA 3 TO 3.5 HR: Performed by: SURGERY

## 2022-02-28 PROCEDURE — 82962 GLUCOSE BLOOD TEST: CPT

## 2022-02-28 PROCEDURE — 76010000110 HC CV SURG 3 TO 3.5 HR: Performed by: SURGERY

## 2022-02-28 PROCEDURE — 77030010507 HC ADH SKN DERMBND J&J -B: Performed by: SURGERY

## 2022-02-28 PROCEDURE — 80048 BASIC METABOLIC PNL TOTAL CA: CPT

## 2022-02-28 PROCEDURE — 87635 SARS-COV-2 COVID-19 AMP PRB: CPT

## 2022-02-28 PROCEDURE — 77030010512 HC APPL CLP LIG J&J -C: Performed by: SURGERY

## 2022-02-28 PROCEDURE — 85347 COAGULATION TIME ACTIVATED: CPT

## 2022-02-28 PROCEDURE — 0315097 BYPASS RIGHT AXILLARY ARTERY TO LEFT UPPER LEG ARTERY WITH AUTOLOGOUS VENOUS TISSUE, OPEN APPROACH: ICD-10-PCS | Performed by: SURGERY

## 2022-02-28 PROCEDURE — 86900 BLOOD TYPING SEROLOGIC ABO: CPT

## 2022-02-28 PROCEDURE — 77030008683 HC TU ET CUF COVD -A: Performed by: ANESTHESIOLOGY

## 2022-02-28 PROCEDURE — 77030040830 HC CATH URETH FOL MDII -A: Performed by: SURGERY

## 2022-02-28 RX ORDER — SUCCINYLCHOLINE CHLORIDE 100 MG/5ML
SYRINGE (ML) INTRAVENOUS AS NEEDED
Status: DISCONTINUED | OUTPATIENT
Start: 2022-02-28 | End: 2022-02-28 | Stop reason: HOSPADM

## 2022-02-28 RX ORDER — VECURONIUM BROMIDE FOR INJECTION 1 MG/ML
INJECTION, POWDER, LYOPHILIZED, FOR SOLUTION INTRAVENOUS AS NEEDED
Status: DISCONTINUED | OUTPATIENT
Start: 2022-02-28 | End: 2022-02-28 | Stop reason: HOSPADM

## 2022-02-28 RX ORDER — SODIUM CHLORIDE 9 MG/ML
INJECTION, SOLUTION INTRAVENOUS
Status: DISCONTINUED | OUTPATIENT
Start: 2022-02-28 | End: 2022-02-28 | Stop reason: HOSPADM

## 2022-02-28 RX ORDER — METOPROLOL TARTRATE 50 MG/1
50 TABLET ORAL 2 TIMES DAILY
Status: DISCONTINUED | OUTPATIENT
Start: 2022-02-28 | End: 2022-03-16 | Stop reason: HOSPADM

## 2022-02-28 RX ORDER — GABAPENTIN 400 MG/1
400 CAPSULE ORAL 3 TIMES DAILY
Status: DISCONTINUED | OUTPATIENT
Start: 2022-02-28 | End: 2022-03-16 | Stop reason: HOSPADM

## 2022-02-28 RX ORDER — MIDAZOLAM HYDROCHLORIDE 1 MG/ML
INJECTION, SOLUTION INTRAMUSCULAR; INTRAVENOUS AS NEEDED
Status: DISCONTINUED | OUTPATIENT
Start: 2022-02-28 | End: 2022-02-28 | Stop reason: HOSPADM

## 2022-02-28 RX ORDER — LOSARTAN POTASSIUM 50 MG/1
100 TABLET ORAL DAILY
Status: DISCONTINUED | OUTPATIENT
Start: 2022-03-01 | End: 2022-03-09

## 2022-02-28 RX ORDER — METOPROLOL TARTRATE 5 MG/5ML
5 INJECTION INTRAVENOUS
Status: DISCONTINUED | OUTPATIENT
Start: 2022-02-28 | End: 2022-02-28

## 2022-02-28 RX ORDER — FAMOTIDINE 20 MG/1
20 TABLET, FILM COATED ORAL ONCE
Status: COMPLETED | OUTPATIENT
Start: 2022-02-28 | End: 2022-02-28

## 2022-02-28 RX ORDER — ONDANSETRON 2 MG/ML
INJECTION INTRAMUSCULAR; INTRAVENOUS AS NEEDED
Status: DISCONTINUED | OUTPATIENT
Start: 2022-02-28 | End: 2022-02-28 | Stop reason: HOSPADM

## 2022-02-28 RX ORDER — HEPARIN SODIUM 200 [USP'U]/100ML
INJECTION, SOLUTION INTRAVENOUS
Status: DISPENSED
Start: 2022-02-28 | End: 2022-03-01

## 2022-02-28 RX ORDER — HEPARIN SODIUM 200 [USP'U]/100ML
INJECTION, SOLUTION INTRAVENOUS
Status: COMPLETED | OUTPATIENT
Start: 2022-02-28 | End: 2022-02-28

## 2022-02-28 RX ORDER — METOPROLOL TARTRATE 5 MG/5ML
INJECTION INTRAVENOUS AS NEEDED
Status: DISCONTINUED | OUTPATIENT
Start: 2022-02-28 | End: 2022-02-28 | Stop reason: HOSPADM

## 2022-02-28 RX ORDER — HYDROMORPHONE HYDROCHLORIDE 1 MG/ML
.5-1 INJECTION, SOLUTION INTRAMUSCULAR; INTRAVENOUS; SUBCUTANEOUS
Status: DISCONTINUED | OUTPATIENT
Start: 2022-02-28 | End: 2022-03-16 | Stop reason: HOSPADM

## 2022-02-28 RX ORDER — FENTANYL CITRATE 50 UG/ML
25 INJECTION, SOLUTION INTRAMUSCULAR; INTRAVENOUS AS NEEDED
Status: DISCONTINUED | OUTPATIENT
Start: 2022-02-28 | End: 2022-03-01

## 2022-02-28 RX ORDER — SODIUM CHLORIDE 0.9 % (FLUSH) 0.9 %
5-40 SYRINGE (ML) INJECTION EVERY 8 HOURS
Status: DISCONTINUED | OUTPATIENT
Start: 2022-02-28 | End: 2022-03-01

## 2022-02-28 RX ORDER — MAGNESIUM SULFATE 100 %
4 CRYSTALS MISCELLANEOUS AS NEEDED
Status: DISCONTINUED | OUTPATIENT
Start: 2022-02-28 | End: 2022-03-01

## 2022-02-28 RX ORDER — DILTIAZEM HYDROCHLORIDE 180 MG/1
180 CAPSULE, COATED, EXTENDED RELEASE ORAL DAILY
Status: DISCONTINUED | OUTPATIENT
Start: 2022-03-01 | End: 2022-03-03

## 2022-02-28 RX ORDER — FENTANYL CITRATE 50 UG/ML
INJECTION, SOLUTION INTRAMUSCULAR; INTRAVENOUS AS NEEDED
Status: DISCONTINUED | OUTPATIENT
Start: 2022-02-28 | End: 2022-02-28 | Stop reason: HOSPADM

## 2022-02-28 RX ORDER — LIDOCAINE HYDROCHLORIDE 10 MG/ML
0.1 INJECTION, SOLUTION EPIDURAL; INFILTRATION; INTRACAUDAL; PERINEURAL AS NEEDED
Status: DISCONTINUED | OUTPATIENT
Start: 2022-02-28 | End: 2022-02-28 | Stop reason: HOSPADM

## 2022-02-28 RX ORDER — CHLORHEXIDINE GLUCONATE 4 G/100ML
SOLUTION TOPICAL
Status: DISPENSED
Start: 2022-02-28 | End: 2022-03-01

## 2022-02-28 RX ORDER — GLYCOPYRROLATE 0.2 MG/ML
INJECTION INTRAMUSCULAR; INTRAVENOUS AS NEEDED
Status: DISCONTINUED | OUTPATIENT
Start: 2022-02-28 | End: 2022-02-28 | Stop reason: HOSPADM

## 2022-02-28 RX ORDER — NALOXONE HYDROCHLORIDE 0.4 MG/ML
0.4 INJECTION, SOLUTION INTRAMUSCULAR; INTRAVENOUS; SUBCUTANEOUS AS NEEDED
Status: DISCONTINUED | OUTPATIENT
Start: 2022-02-28 | End: 2022-03-16 | Stop reason: HOSPADM

## 2022-02-28 RX ORDER — DEXTROSE, SODIUM CHLORIDE, SODIUM LACTATE, POTASSIUM CHLORIDE, AND CALCIUM CHLORIDE 5; .6; .31; .03; .02 G/100ML; G/100ML; G/100ML; G/100ML; G/100ML
75 INJECTION, SOLUTION INTRAVENOUS CONTINUOUS
Status: DISPENSED | OUTPATIENT
Start: 2022-02-28 | End: 2022-03-01

## 2022-02-28 RX ORDER — LIDOCAINE HYDROCHLORIDE 10 MG/ML
INJECTION, SOLUTION EPIDURAL; INFILTRATION; INTRACAUDAL; PERINEURAL
Status: DISPENSED
Start: 2022-02-28 | End: 2022-03-01

## 2022-02-28 RX ORDER — SODIUM CHLORIDE, SODIUM LACTATE, POTASSIUM CHLORIDE, CALCIUM CHLORIDE 600; 310; 30; 20 MG/100ML; MG/100ML; MG/100ML; MG/100ML
25 INJECTION, SOLUTION INTRAVENOUS CONTINUOUS
Status: DISCONTINUED | OUTPATIENT
Start: 2022-02-28 | End: 2022-03-01

## 2022-02-28 RX ORDER — ATORVASTATIN CALCIUM 40 MG/1
80 TABLET, FILM COATED ORAL
Status: DISCONTINUED | OUTPATIENT
Start: 2022-02-28 | End: 2022-03-16 | Stop reason: HOSPADM

## 2022-02-28 RX ORDER — GUAIFENESIN 100 MG/5ML
81 LIQUID (ML) ORAL DAILY
Status: DISCONTINUED | OUTPATIENT
Start: 2022-03-01 | End: 2022-03-16 | Stop reason: HOSPADM

## 2022-02-28 RX ORDER — LIDOCAINE HYDROCHLORIDE 20 MG/ML
INJECTION, SOLUTION EPIDURAL; INFILTRATION; INTRACAUDAL; PERINEURAL AS NEEDED
Status: DISCONTINUED | OUTPATIENT
Start: 2022-02-28 | End: 2022-02-28 | Stop reason: HOSPADM

## 2022-02-28 RX ORDER — METOPROLOL TARTRATE 5 MG/5ML
5 INJECTION INTRAVENOUS
Status: DISCONTINUED | OUTPATIENT
Start: 2022-02-28 | End: 2022-03-16 | Stop reason: HOSPADM

## 2022-02-28 RX ORDER — PROPOFOL 10 MG/ML
INJECTION, EMULSION INTRAVENOUS AS NEEDED
Status: DISCONTINUED | OUTPATIENT
Start: 2022-02-28 | End: 2022-02-28 | Stop reason: HOSPADM

## 2022-02-28 RX ORDER — HEPARIN SODIUM 1000 [USP'U]/ML
INJECTION, SOLUTION INTRAVENOUS; SUBCUTANEOUS AS NEEDED
Status: DISCONTINUED | OUTPATIENT
Start: 2022-02-28 | End: 2022-02-28 | Stop reason: HOSPADM

## 2022-02-28 RX ORDER — FENTANYL CITRATE 50 UG/ML
50 INJECTION, SOLUTION INTRAMUSCULAR; INTRAVENOUS
Status: DISCONTINUED | OUTPATIENT
Start: 2022-02-28 | End: 2022-03-01

## 2022-02-28 RX ORDER — FUROSEMIDE 20 MG/1
20 TABLET ORAL DAILY
Status: DISCONTINUED | OUTPATIENT
Start: 2022-03-01 | End: 2022-03-16 | Stop reason: HOSPADM

## 2022-02-28 RX ORDER — INSULIN LISPRO 100 [IU]/ML
INJECTION, SOLUTION INTRAVENOUS; SUBCUTANEOUS ONCE
Status: DISCONTINUED | OUTPATIENT
Start: 2022-02-28 | End: 2022-02-28 | Stop reason: HOSPADM

## 2022-02-28 RX ORDER — SODIUM CHLORIDE 0.9 % (FLUSH) 0.9 %
5-40 SYRINGE (ML) INJECTION AS NEEDED
Status: DISCONTINUED | OUTPATIENT
Start: 2022-02-28 | End: 2022-03-01

## 2022-02-28 RX ORDER — DEXTROSE MONOHYDRATE 100 MG/ML
0-250 INJECTION, SOLUTION INTRAVENOUS AS NEEDED
Status: DISCONTINUED | OUTPATIENT
Start: 2022-02-28 | End: 2022-03-01

## 2022-02-28 RX ORDER — SODIUM CHLORIDE, SODIUM LACTATE, POTASSIUM CHLORIDE, CALCIUM CHLORIDE 600; 310; 30; 20 MG/100ML; MG/100ML; MG/100ML; MG/100ML
25 INJECTION, SOLUTION INTRAVENOUS CONTINUOUS
Status: DISCONTINUED | OUTPATIENT
Start: 2022-02-28 | End: 2022-02-28 | Stop reason: HOSPADM

## 2022-02-28 RX ORDER — OXYCODONE AND ACETAMINOPHEN 5; 325 MG/1; MG/1
1 TABLET ORAL
Status: DISCONTINUED | OUTPATIENT
Start: 2022-02-28 | End: 2022-03-16 | Stop reason: HOSPADM

## 2022-02-28 RX ORDER — NEOSTIGMINE METHYLSULFATE 1 MG/ML
INJECTION, SOLUTION INTRAVENOUS AS NEEDED
Status: DISCONTINUED | OUTPATIENT
Start: 2022-02-28 | End: 2022-02-28 | Stop reason: HOSPADM

## 2022-02-28 RX ADMIN — OXYCODONE AND ACETAMINOPHEN 1 TABLET: 5; 325 TABLET ORAL at 18:01

## 2022-02-28 RX ADMIN — METOPROLOL TARTRATE 2 MG: 5 INJECTION, SOLUTION INTRAVENOUS at 13:59

## 2022-02-28 RX ADMIN — HYDROMORPHONE HYDROCHLORIDE 1 MG: 1 INJECTION, SOLUTION INTRAMUSCULAR; INTRAVENOUS; SUBCUTANEOUS at 18:01

## 2022-02-28 RX ADMIN — SODIUM CHLORIDE: 9 INJECTION, SOLUTION INTRAVENOUS at 12:46

## 2022-02-28 RX ADMIN — PROPOFOL 130 MG: 10 INJECTION, EMULSION INTRAVENOUS at 12:40

## 2022-02-28 RX ADMIN — WATER 2 G: 1 INJECTION INTRAMUSCULAR; INTRAVENOUS; SUBCUTANEOUS at 12:45

## 2022-02-28 RX ADMIN — SODIUM CHLORIDE, PRESERVATIVE FREE 10 ML: 5 INJECTION INTRAVENOUS at 17:00

## 2022-02-28 RX ADMIN — HEPARIN SODIUM 4000 UNITS: 1000 INJECTION, SOLUTION INTRAVENOUS; SUBCUTANEOUS at 14:02

## 2022-02-28 RX ADMIN — FENTANYL CITRATE 50 MCG: 50 INJECTION, SOLUTION INTRAMUSCULAR; INTRAVENOUS at 13:50

## 2022-02-28 RX ADMIN — VECURONIUM BROMIDE 6 MG: 1 INJECTION, POWDER, LYOPHILIZED, FOR SOLUTION INTRAVENOUS at 12:43

## 2022-02-28 RX ADMIN — GABAPENTIN 400 MG: 400 CAPSULE ORAL at 17:46

## 2022-02-28 RX ADMIN — FENTANYL CITRATE 50 MCG: 50 INJECTION, SOLUTION INTRAMUSCULAR; INTRAVENOUS at 15:13

## 2022-02-28 RX ADMIN — METOPROLOL TARTRATE 2 MG: 5 INJECTION, SOLUTION INTRAVENOUS at 15:14

## 2022-02-28 RX ADMIN — VECURONIUM BROMIDE 4 MG: 1 INJECTION, POWDER, LYOPHILIZED, FOR SOLUTION INTRAVENOUS at 13:50

## 2022-02-28 RX ADMIN — LIDOCAINE HYDROCHLORIDE 40 MG: 20 INJECTION, SOLUTION EPIDURAL; INFILTRATION; INTRACAUDAL; PERINEURAL at 12:40

## 2022-02-28 RX ADMIN — ONDANSETRON 4 MG: 2 INJECTION INTRAMUSCULAR; INTRAVENOUS at 15:31

## 2022-02-28 RX ADMIN — SODIUM CHLORIDE, SODIUM LACTATE, POTASSIUM CHLORIDE, CALCIUM CHLORIDE, AND DEXTROSE MONOHYDRATE 75 ML/HR: 600; 310; 30; 20; 5 INJECTION, SOLUTION INTRAVENOUS at 18:02

## 2022-02-28 RX ADMIN — GLYCOPYRROLATE 0.4 MG: 0.2 INJECTION, SOLUTION INTRAMUSCULAR; INTRAVENOUS at 15:31

## 2022-02-28 RX ADMIN — SODIUM CHLORIDE, POTASSIUM CHLORIDE, SODIUM LACTATE AND CALCIUM CHLORIDE 25 ML/HR: 600; 310; 30; 20 INJECTION, SOLUTION INTRAVENOUS at 11:02

## 2022-02-28 RX ADMIN — GABAPENTIN 400 MG: 400 CAPSULE ORAL at 22:06

## 2022-02-28 RX ADMIN — ATORVASTATIN CALCIUM 80 MG: 40 TABLET, FILM COATED ORAL at 22:05

## 2022-02-28 RX ADMIN — HEPARIN SODIUM 1000 UNITS: 1000 INJECTION, SOLUTION INTRAVENOUS; SUBCUTANEOUS at 14:23

## 2022-02-28 RX ADMIN — FAMOTIDINE 20 MG: 20 TABLET, FILM COATED ORAL at 11:02

## 2022-02-28 RX ADMIN — OXYCODONE AND ACETAMINOPHEN 1 TABLET: 5; 325 TABLET ORAL at 22:05

## 2022-02-28 RX ADMIN — SODIUM CHLORIDE, PRESERVATIVE FREE 10 ML: 5 INJECTION INTRAVENOUS at 22:06

## 2022-02-28 RX ADMIN — WATER 2 G: 1 INJECTION INTRAMUSCULAR; INTRAVENOUS; SUBCUTANEOUS at 22:05

## 2022-02-28 RX ADMIN — METOPROLOL TARTRATE 50 MG: 50 TABLET, FILM COATED ORAL at 17:46

## 2022-02-28 RX ADMIN — FENTANYL CITRATE 100 MCG: 50 INJECTION, SOLUTION INTRAMUSCULAR; INTRAVENOUS at 13:21

## 2022-02-28 RX ADMIN — Medication 3 MG: at 15:31

## 2022-02-28 RX ADMIN — Medication 100 MG: at 12:40

## 2022-02-28 RX ADMIN — SODIUM CHLORIDE: 9 INJECTION, SOLUTION INTRAVENOUS at 14:13

## 2022-02-28 RX ADMIN — FENTANYL CITRATE 100 MCG: 50 INJECTION, SOLUTION INTRAMUSCULAR; INTRAVENOUS at 12:40

## 2022-02-28 RX ADMIN — MIDAZOLAM HYDROCHLORIDE 2 MG: 2 INJECTION, SOLUTION INTRAMUSCULAR; INTRAVENOUS at 12:29

## 2022-02-28 NOTE — ANESTHESIA POSTPROCEDURE EVALUATION
Procedure(s):  RIGHT LEG ILIAC DEEP FEMORAL TO ANTERIOR TIBIAL BYPASS. general    Anesthesia Post Evaluation      Multimodal analgesia: multimodal analgesia used between 6 hours prior to anesthesia start to PACU discharge  Patient location during evaluation: bedside  Patient participation: complete - patient participated  Level of consciousness: awake  Pain management: adequate  Airway patency: patent  Anesthetic complications: no  Cardiovascular status: stable  Respiratory status: acceptable  Hydration status: acceptable  Post anesthesia nausea and vomiting:  controlled      INITIAL Post-op Vital signs:   Vitals Value Taken Time   /80 02/28/22 1800   Temp 36.8 °C (98.3 °F) 02/28/22 1604   Pulse 125 02/28/22 1817   Resp 20 02/28/22 1817   SpO2 94 % 02/28/22 1817   Vitals shown include unvalidated device data.

## 2022-02-28 NOTE — ROUTINE PROCESS
TRANSFER - OUT REPORT:    Verbal [facility]  [facility] to [facility] for routine post - op       Report consisted of patients Situation, Background, Assessment and   Recommendations(SBAR). Information from the following report(s) SBAR, Kardex, OR Summary, Procedure Summary and Recent Results was reviewed with the receiving nurse. Lines:   Peripheral IV 02/28/22 Left;Posterior Hand (Active)   Site Assessment Clean, dry, & intact 02/28/22 1326   Phlebitis Assessment 0 02/28/22 1326   Dressing Status Clean, dry, & intact 02/28/22 1326   Dressing Type Transparent 02/28/22 1326   Hub Color/Line Status Blue 02/28/22 1326   Alcohol Cap Used No 02/28/22 1113        Opportunity for questions and clarification was provided.       Patient transported with:   Monitor  Registered Nurse

## 2022-02-28 NOTE — OP NOTES
Preoperative diagnosis: Femoral-popliteal occlusion right side, aortoiliac disease. Gangrene right foot    Postoperative diagnosis: Same    Procedures performed:  #1  Autologous vein bypass axillofemoral graft femoral location to anterior tibial pulse extra anatomical lateral course      Cultures: None    Specimens: None    Drains: None    Estimated blood loss: Less than 50 mL    Assistants: None    Implants: Please see above    Complications: None    Anesthesia: General anesthesia    Indications for the procedure: Elizabeth Pierce is a 59 y.o. has had multiple occlusions of his femoropopliteal bypass graft. Now developing pain tissue loss at toes 4 and 5 and on medial lateral foot. Patient was given the appropriate risk and benefits of the procedure including but not limited to bleeding, infection, damage to adjacent structures, MI, stroke, death, loss of lower extremity, need for further surgery. Patient was understanding of all the risks and underwent a procedure. Operative findings:   #1  Successful bypass from the distal axillofemoral graft extending to the anterior tibial utilizing cryo cadaver vein    Procedure:  Patient was correctly identified in the preoperative area taken to the operating room in stable condition. Patient had pre-incision timeout prior to any incision. Patient was prepped and draped in the normal sterile fashion according to CDC guidelines aseptic technique. First the main incision at a prior site in the groin exposing the distal end of the axillary femoral graft. It is patent at this level. Placed a vessel loop. Made a second incision on the anterior lateral leg below the knee carried down through skin soft tissue fascia next identifying the anterior tibial artery. The anterior tibial artery is diffusely calcific no soft spots were identified but at a branch point and distal there seem to be some monophasic flow.   I made a tunnel between the 2 incisions and after prepping and flushing a cadaver vein placed a cadaver vein in a reverse fashion through the tunnel. I had marked it to avoid any twists. Anticoagulated the patient with heparin. Then placed clamps on the graft performed a graftotomy 11 blade scalpel Restrepo scissors spatulated the vein bypass and sewed it end-to-side to the graft with 6-0 Prolene suture circular standard fashion. Upon completion of this place controls on the vein graft and the controls off the graft. There was a nice pulse of the inflow. Attention was turned to the anterior tibial artery placed Vesseloops with a marked much help but there was no hardly any flow at the side a medial incision limb with scalpel and Restrepo scissors I did have visualization of the lumen. The cut the graft to the appropriate length spatulated and sewed it end-to-side to the artery with 7-0 Prolene suture. Discharge prior to completion of blood the graft remove any air. And I took all the controls off and there was a nice flow in the anterior tibial multiphasic in nature and there is no bleeding there was some muscular bleeding which I controlled with electrocautery. Now we have a nice pulse on the dorsalis pedis also. Is very pleased with this. Irrigated with antibiotic and closed the subcu and skin at the lower incision 3-0 interrupted Monocryl and 4 oh and glue for the skin. Irrigated the groin and closed the deep space with 2-0 Monocryl interrupted 4-0 Monocryl and Dermabond glue for the skin. Is transferred to recovery in stable condition.

## 2022-02-28 NOTE — H&P
Surgery History and Physical    Subjective: Dago Monzon is a 59 y.o.  male who presents with occlusion of right femoropopliteal bypass.     Patient Active Problem List    Diagnosis Date Noted    Occlusion of right femoral-popliteal bypass graft ([address] 02/28/2022    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[address] 03/31/2021    Skin ulcer of malleolar area of right ankle ([address]     Prolonged Q-T interval on ECG 08/19/2017    Adverse effect of amiodarone 08/19/2017    Status post above-knee amputation of left lower extremity ([address] 08/18/2017    Aftercare for amputation stump 08/18/2017    Ischemic rest pain of lower extremity 08/14/2017    Chronic ulcer of right heel ([address] 08/08/2017    Chronic anemia     Acute blood loss as cause of postoperative anemia 07/25/2017    Aftercare following surgery of the circulatory system 07/25/2017    Impaired mobility and ADLs 07/24/2017    Infection of vascular bypass graft ([address] 07/24/2017    Moderate to severe pulmonary hypertension ([address] 07/23/2017    Pseudoaneurysm of femoral artery ([address] 06/27/2017    History of vitamin D deficiency 04/22/2017    Status post femoral-popliteal bypass surgery 04/21/2017    Ischemic cardiomyopathy     Chronic systolic heart failure (HCC)     Carotid artery disease ([address]     Dyslipidemia     History of cardioversion 04/18/2017    Paroxysmal atrial fibrillation ([address]     Critical ischemia of lower extremity ([address] 04/10/2017    Euthyroid sick syndrome 04/06/2017    Coronary artery disease involving native coronary artery of native heart 03/15/2017    Aortoiliac occlusive disease ([address] 01/25/2017    Atherosclerosis of native artery of both lower extremities with intermittent claudication ([address] 01/25/2017    Peripheral artery disease ([address] 01/25/2017    Hereditary peripheral neuropathy 11/15/2016    Erectile dysfunction 07/05/2016    Spinal stenosis of lumbar region with radiculopathy 05/04/2015    Benign hypertensive heart disease with systolic congestive heart failure, NYHA class 2 ([address] 01/26/2015    DDD (degenerative disc disease), lumbar 01/26/2015     Past Medical History:   Diagnosis Date    Acute blood loss as cause of postoperative anemia 4/4/2017    Anticoagulated on Coumadin     Aortoiliac occlusive disease ([address] 1/25/2017    Atherosclerosis of native artery of both lower extremities with intermittent claudication ([address] 1/25/2017    Benign hypertensive heart disease with systolic congestive heart failure, NYHA class 2 ([address] 1/26/2015    Carotid artery disease (HCC)     Chronic anemia     Chronic systolic heart failure (HCC)     Chronic ulcer of right foot ([address] 4/4/2017    Chronic ulcer of right heel ([address] 8/8/2017    Coronary artery disease involving native coronary artery of native heart 3/15/2017    Successful stenting of Cx (Xience LESLEY) and RCA (Xience LESLEY) to 0% by Dr. Toño Manzanares on 3/15/17.     DDD (degenerative disc disease), lumbar 1/26/2015    Dyslipidemia     Erectile dysfunction 7/5/2016    Euthyroid sick syndrome 4/6/2017    Hereditary peripheral neuropathy 11/15/2016    History of cardioversion 04/18/2017    S/P Synchronized external cardioversion (4/18/2017 - Dr. Fidel De La Cruz)    History of vitamin D deficiency 4/22/2017    Vitamin D 25-Hydroxy (4/22/2017) = 12.0; Vitamin D 25-Hydroxy (5/26/2017) = 38.7    Infection of vascular bypass graft ([address] 7/24/2017    Left lower extremity    Ischemic cardiomyopathy     Moderate to severe pulmonary hypertension ([address] 07/23/2017    Paroxysmal atrial fibrillation (HCC)     Peripheral artery disease ([address] 1/25/2017    Skin ulcer of malleolar area of right ankle ([address]     Spinal stenosis of lumbar region with radiculopathy 5/4/2015    Dr. Ines Cárdenas      Past Surgical History:   Procedure Laterality Date    CARDIAC CATHETERIZATION  3/8/2017         CARDIAC CATHETERIZATION  3/15/2017         CORONARY STENT SINGLE W/PTCA 3/15/2017         HX ABOVE KNEE AMPUTATION Left 08/18/2017    S/P Left above-the-knee amputation (8/18/2017 - Dr. Miriam Dumont)    HX ATHERECTOMY Left 06/15/2017    S/P Atherectomy and balloon angioplasty of the left superficial femoral artery and above-knee popliteal artery (6/15/2017 - Dr. Miriam Dumont)    HX ATHERECTOMY Right 09/07/2017    S/P Atherectomy and balloon angioplasty of the below-the-knee popliteal artery, above-the-knee popliteal artery, tibioperoneal trunk artery and posterior tibial artery (9/7/2017 - Dr. Miriam Dumont)    HX COLONOSCOPY  07/23/2015    polyps repeat 2020 5 years Mireya [address] Right 04/04/2017    S/P Right axillary to bifemoral artery bypass using an 8 mm Propaten graft from the right axilla to the right common femoral artery with a jump femoral-femoral bypass with another 8 mm Propaten external ring bypass; Right common femoral artery, and profunda femoris artery and superficial femoral artery endarterectomy causing an extensive surgery on the right side (4/4/2017 - Dr. Sabas Stock)    [address] Right 04/07/2017    S/P Cutdown of femoral-femoral bypass, more on the left groin side; Right lower extremity angiography with first-order catheterization (4/7/2017 - Dr. Miriam Dumont)    HX FEMORAL BYPASS Right 04/10/2017    S/P Right femoral to above-knee popliteal bypass with an 8 mm PTFE graft (4/10/2017 - Dr. Spenser Koenig)    HX OTHER SURGICAL Left 07/25/2017    S/P Removal of infected graft; Repair of left superficial femoral artery; Repair of left common femoral artery (7/25/2017 - Dr. Miriam Dumont)    HX OTHER SURGICAL Right 06/27/2017    S/P Drainage of right side abscess (6/27/2017 - Dr. Miriam Dumont)    HX OTHER SURGICAL Left 07/01/2017    S/P Left groin exploration; Left femoral repair pseudoaneurysm; Left wound washout;  Wound VAC placement (7/01/2017 - Dr. Miriam Dumont)    HX OTHER SURGICAL Left 07/04/2017    S/P Left common femoral artery ligation; Jump bypass from right to left fem-fem to a more distal superficial femoral artery using 8 mm external ringed Propaten graft; Left groin wound exploration and washout (7/4/2017 - Dr. Yarely Patel)    HX OTHER SURGICAL Right 08/18/2017    S/P Right axillary femoral jump bypass interposition; Removal of infected right axillary femoral bypass; Wound VAC placement of upper thigh 1.2 cm x 5.2 cm x 1.8 cm and groin 4 cm x 0.5 cm x 0.2 cm (8/18/2017 - Dr. Yarely Patel)      Social History     Tobacco Use    Smoking status: Former Smoker    Smokeless tobacco: Never Used    Tobacco comment: quit in 2011   Substance Use Topics    Alcohol use: Yes     Alcohol/week: 2.0 standard drinks     Types: 2 Cans of beer per week     Comment: 10 cans of beer a month      Family History   Problem Relation Age of Onset    Heart Disease Father       Prior to Admission medications    Medication Sig Start Date End Date Taking? Authorizing Provider   metoprolol tartrate (LOPRESSOR) 50 mg tablet TAKE 1 TABLET BY MOUTH TWO TIMES A DAY. 1/24/22   Laureano Wellington MD   dilTIAZem ER (CARDIZEM CD) 120 mg capsule Take 1 Capsule by mouth nightly. 12/7/21   Lexis Levels D, NP   dilTIAZem ER (CARDIZEM CD) 180 mg capsule Take 1 tablet by mouth once every morning. 12/7/21   Lexis Levels D, NP   rivaroxaban (Xarelto) 20 mg tab tablet Take 1 Tablet by mouth daily. 12/7/21   Lexis Levels D, NP   furosemide (LASIX) 20 mg tablet Take 1 Tablet by mouth daily. 11/23/21   Laureano Wellington MD   losartan (COZAAR) 100 mg tablet Take 1 Tablet by mouth daily. 10/7/21   Laureano Wellington MD   atorvastatin (LIPITOR) 80 mg tablet TAKE 1 TABLET ONCE DAILY 10/7/21   Laureano Wellington MD   aspirin 81 mg chewable tablet Take 1 Tab by mouth daily.  4/10/20   Laureano Wellington MD   gabapentin (NEURONTIN) 400 mg capsule take 1 capsule by mouth three times a day 3/29/18   Douglas Lombardo MD   multivitamin (ONE A DAY) tablet Take 1 Tab by mouth daily. Provider, Historical     Allergies   Allergen Reactions    Morphine Other (comments)     Patient gets confused with morphine. Review of Systems:    A comprehensive review of systems was negative except for that written in the History of Present Illness. Objective:     No data found. No data recorded. Physical Exam:  LUNG: clear to auscultation bilaterally, HEART: S1, S2 normal, ABDOMEN: soft, non-tender. Bowel sounds normal. No masses,  no organomegaly. Right lower extremity with no palpable pulses. Left leg amputation    Labs: No results found for this or any previous visit (from the past 24 hour(s)).     Data Review:    CBC:   Lab Results   Component Value Date/Time    WBC 14.0 (H) 12/17/2021 06:30 AM    RBC 4.46 12/17/2021 06:30 AM    HGB 12.9 (L) 12/17/2021 06:30 AM    HCT 39.9 12/17/2021 06:30 AM    PLATELET 484 94/11/0267 06:30 AM      BMP:   Lab Results   Component Value Date/Time    Glucose 130 (H) 12/17/2021 06:30 AM    Sodium 140 12/17/2021 06:30 AM    Potassium 4.0 12/17/2021 06:30 AM    Chloride 110 12/17/2021 06:30 AM    CO2 25 12/17/2021 06:30 AM    BUN 11 12/17/2021 06:30 AM    Creatinine 0.89 12/17/2021 06:30 AM    Calcium 8.7 12/17/2021 06:30 AM       Assessment:     Active Problems:    Occlusion of right femoral-popliteal bypass graft ([address] (2/28/2022)        Plan:     Right axillofemoral graft bypass to right anterior tibial bypass with cadaver vein    Signed By: Yamileth Langley MD     February 28, 2022

## 2022-02-28 NOTE — ANESTHESIA PREPROCEDURE EVALUATION
Relevant Problems   No relevant active problems       Anesthetic History   No history of anesthetic complications            Review of Systems / Medical History  Patient summary reviewed and pertinent labs reviewed    Pulmonary  Within defined limits                 Neuro/Psych   Within defined limits           Cardiovascular            Dysrhythmias   CAD, PAD and cardiac stents    Exercise tolerance: <4 METS     GI/Hepatic/Renal     GERD           Endo/Other      Hypothyroidism: well controlled  Arthritis     Other Findings              Physical Exam    Airway  Mallampati: III  TM Distance: 4 - 6 cm  Neck ROM: decreased range of motion   Mouth opening: Normal     Cardiovascular    Rhythm: regular  Rate: normal         Dental    Dentition: Full lower dentures and Full upper dentures     Pulmonary  Breath sounds clear to auscultation               Abdominal  GI exam deferred       Other Findings            Anesthetic Plan    ASA: 3  Anesthesia type: general    Monitoring Plan: Arterial line      Induction: Intravenous  Anesthetic plan and risks discussed with: Patient

## 2022-03-01 LAB
ANION GAP SERPL CALC-SCNC: 4 MMOL/L (ref 3–18)
APTT PPP: 36.1 SEC (ref 23–36.4)
BASOPHILS # BLD: 0.1 K/UL (ref 0–0.1)
BASOPHILS NFR BLD: 1 % (ref 0–2)
BUN SERPL-MCNC: 8 MG/DL (ref 7–18)
BUN/CREAT SERPL: 11 (ref 12–20)
CALCIUM SERPL-MCNC: 7.9 MG/DL (ref 8.5–10.1)
CALCULATED R AXIS, ECG10: -2 DEGREES
CALCULATED T AXIS, ECG11: -86 DEGREES
CHLORIDE SERPL-SCNC: 113 MMOL/L (ref 100–111)
CO2 SERPL-SCNC: 24 MMOL/L (ref 21–32)
CREAT SERPL-MCNC: 0.71 MG/DL (ref 0.6–1.3)
DIAGNOSIS, 93000: NORMAL
DIFFERENTIAL METHOD BLD: ABNORMAL
EOSINOPHIL # BLD: 0 K/UL (ref 0–0.4)
EOSINOPHIL NFR BLD: 0 % (ref 0–5)
ERYTHROCYTE [DISTWIDTH] IN BLOOD BY AUTOMATED COUNT: 16.3 % (ref 11.6–14.5)
GLUCOSE SERPL-MCNC: 104 MG/DL (ref 74–99)
HCT VFR BLD AUTO: 26.1 % (ref 36–48)
HGB BLD-MCNC: 8.3 G/DL (ref 13–16)
IMM GRANULOCYTES # BLD AUTO: 0 K/UL (ref 0–0.04)
IMM GRANULOCYTES NFR BLD AUTO: 0 % (ref 0–0.5)
LYMPHOCYTES # BLD: 1.7 K/UL (ref 0.9–3.6)
LYMPHOCYTES NFR BLD: 19 % (ref 21–52)
MAGNESIUM SERPL-MCNC: 1.7 MG/DL (ref 1.6–2.6)
MAGNESIUM SERPL-MCNC: 2.1 MG/DL (ref 1.6–2.6)
MCH RBC QN AUTO: 28.3 PG (ref 24–34)
MCHC RBC AUTO-ENTMCNC: 31.8 G/DL (ref 31–37)
MCV RBC AUTO: 89.1 FL (ref 78–100)
MONOCYTES # BLD: 0.9 K/UL (ref 0.05–1.2)
MONOCYTES NFR BLD: 10 % (ref 3–10)
NEUTS SEG # BLD: 6.2 K/UL (ref 1.8–8)
NEUTS SEG NFR BLD: 69 % (ref 40–73)
NRBC # BLD: 0 K/UL (ref 0–0.01)
NRBC BLD-RTO: 0 PER 100 WBC
PLATELET # BLD AUTO: 359 K/UL (ref 135–420)
PMV BLD AUTO: 8.9 FL (ref 9.2–11.8)
POTASSIUM SERPL-SCNC: 3.1 MMOL/L (ref 3.5–5.5)
POTASSIUM SERPL-SCNC: 3.9 MMOL/L (ref 3.5–5.5)
Q-T INTERVAL, ECG07: 314 MS
QRS DURATION, ECG06: 88 MS
QTC CALCULATION (BEZET), ECG08: 483 MS
RBC # BLD AUTO: 2.93 M/UL (ref 4.35–5.65)
SODIUM SERPL-SCNC: 141 MMOL/L (ref 136–145)
VENTRICULAR RATE, ECG03: 142 BPM
WBC # BLD AUTO: 9 K/UL (ref 4.6–13.2)

## 2022-03-01 PROCEDURE — 93005 ELECTROCARDIOGRAM TRACING: CPT

## 2022-03-01 PROCEDURE — 77030037878 HC DRSG MEPILEX >48IN BORD MOLN -B

## 2022-03-01 PROCEDURE — 85025 COMPLETE CBC W/AUTO DIFF WBC: CPT

## 2022-03-01 PROCEDURE — 84132 ASSAY OF SERUM POTASSIUM: CPT

## 2022-03-01 PROCEDURE — 85730 THROMBOPLASTIN TIME PARTIAL: CPT

## 2022-03-01 PROCEDURE — 74011250637 HC RX REV CODE- 250/637: Performed by: SURGERY

## 2022-03-01 PROCEDURE — 99223 1ST HOSP IP/OBS HIGH 75: CPT | Performed by: INTERNAL MEDICINE

## 2022-03-01 PROCEDURE — 2709999900 HC NON-CHARGEABLE SUPPLY

## 2022-03-01 PROCEDURE — 65620000000 HC RM CCU GENERAL

## 2022-03-01 PROCEDURE — 74011000258 HC RX REV CODE- 258: Performed by: INTERNAL MEDICINE

## 2022-03-01 PROCEDURE — 94762 N-INVAS EAR/PLS OXIMTRY CONT: CPT

## 2022-03-01 PROCEDURE — 74011250636 HC RX REV CODE- 250/636: Performed by: SURGERY

## 2022-03-01 PROCEDURE — 74011250636 HC RX REV CODE- 250/636: Performed by: INTERNAL MEDICINE

## 2022-03-01 PROCEDURE — 83735 ASSAY OF MAGNESIUM: CPT

## 2022-03-01 PROCEDURE — 77010033678 HC OXYGEN DAILY

## 2022-03-01 PROCEDURE — 74011000250 HC RX REV CODE- 250: Performed by: SURGERY

## 2022-03-01 RX ORDER — SODIUM CHLORIDE 0.9 % (FLUSH) 0.9 %
10 SYRINGE (ML) INJECTION EVERY 8 HOURS
Status: DISCONTINUED | OUTPATIENT
Start: 2022-03-01 | End: 2022-03-16 | Stop reason: HOSPADM

## 2022-03-01 RX ORDER — POTASSIUM CHLORIDE 7.45 MG/ML
10 INJECTION INTRAVENOUS
Status: DISCONTINUED | OUTPATIENT
Start: 2022-03-01 | End: 2022-03-01 | Stop reason: SDUPTHER

## 2022-03-01 RX ORDER — POTASSIUM CHLORIDE 750 MG/1
10 TABLET, EXTENDED RELEASE ORAL ONCE
Status: DISCONTINUED | OUTPATIENT
Start: 2022-03-01 | End: 2022-03-01

## 2022-03-01 RX ORDER — POTASSIUM CHLORIDE 750 MG/1
10 TABLET, EXTENDED RELEASE ORAL
Status: DISCONTINUED | OUTPATIENT
Start: 2022-03-01 | End: 2022-03-01

## 2022-03-01 RX ORDER — MAGNESIUM SULFATE HEPTAHYDRATE 40 MG/ML
2 INJECTION, SOLUTION INTRAVENOUS ONCE
Status: COMPLETED | OUTPATIENT
Start: 2022-03-01 | End: 2022-03-01

## 2022-03-01 RX ORDER — POTASSIUM CHLORIDE 7.45 MG/ML
10 INJECTION INTRAVENOUS
Status: COMPLETED | OUTPATIENT
Start: 2022-03-01 | End: 2022-03-01

## 2022-03-01 RX ORDER — POTASSIUM CHLORIDE 7.45 MG/ML
10 INJECTION INTRAVENOUS
Status: DISCONTINUED | OUTPATIENT
Start: 2022-03-01 | End: 2022-03-01

## 2022-03-01 RX ORDER — MAGNESIUM SULFATE HEPTAHYDRATE 40 MG/ML
2 INJECTION, SOLUTION INTRAVENOUS ONCE
Status: DISCONTINUED | OUTPATIENT
Start: 2022-03-01 | End: 2022-03-01

## 2022-03-01 RX ORDER — OXYCODONE AND ACETAMINOPHEN 5; 325 MG/1; MG/1
1 TABLET ORAL
Status: COMPLETED | OUTPATIENT
Start: 2022-03-01 | End: 2022-03-01

## 2022-03-01 RX ORDER — MAGNESIUM SULFATE 1 G/100ML
1 INJECTION INTRAVENOUS ONCE
Status: DISCONTINUED | OUTPATIENT
Start: 2022-03-01 | End: 2022-03-01

## 2022-03-01 RX ORDER — MAGNESIUM SULFATE HEPTAHYDRATE 40 MG/ML
2 INJECTION, SOLUTION INTRAVENOUS
Status: DISCONTINUED | OUTPATIENT
Start: 2022-03-01 | End: 2022-03-01

## 2022-03-01 RX ORDER — POTASSIUM CHLORIDE 20 MEQ/1
20 TABLET, EXTENDED RELEASE ORAL ONCE
Status: DISCONTINUED | OUTPATIENT
Start: 2022-03-01 | End: 2022-03-01

## 2022-03-01 RX ORDER — POTASSIUM CHLORIDE 7.45 MG/ML
10 INJECTION INTRAVENOUS ONCE
Status: DISCONTINUED | OUTPATIENT
Start: 2022-03-01 | End: 2022-03-01

## 2022-03-01 RX ORDER — POTASSIUM CHLORIDE 20 MEQ/1
40 TABLET, EXTENDED RELEASE ORAL ONCE
Status: DISCONTINUED | OUTPATIENT
Start: 2022-03-01 | End: 2022-03-01

## 2022-03-01 RX ORDER — OXYCODONE AND ACETAMINOPHEN 5; 325 MG/1; MG/1
2 TABLET ORAL
Status: DISCONTINUED | OUTPATIENT
Start: 2022-03-01 | End: 2022-03-16 | Stop reason: HOSPADM

## 2022-03-01 RX ADMIN — OXYCODONE AND ACETAMINOPHEN 2 TABLET: 5; 325 TABLET ORAL at 22:33

## 2022-03-01 RX ADMIN — GABAPENTIN 400 MG: 400 CAPSULE ORAL at 21:05

## 2022-03-01 RX ADMIN — METOPROLOL TARTRATE 5 MG: 5 INJECTION, SOLUTION INTRAVENOUS at 07:31

## 2022-03-01 RX ADMIN — METOPROLOL TARTRATE 5 MG: 5 INJECTION, SOLUTION INTRAVENOUS at 01:09

## 2022-03-01 RX ADMIN — SODIUM CHLORIDE, PRESERVATIVE FREE 10 ML: 5 INJECTION INTRAVENOUS at 22:33

## 2022-03-01 RX ADMIN — OXYCODONE AND ACETAMINOPHEN 1 TABLET: 5; 325 TABLET ORAL at 13:33

## 2022-03-01 RX ADMIN — MAGNESIUM SULFATE 2 G: 2 INJECTION INTRAVENOUS at 07:46

## 2022-03-01 RX ADMIN — METOPROLOL TARTRATE 50 MG: 50 TABLET, FILM COATED ORAL at 06:46

## 2022-03-01 RX ADMIN — HEPARIN SODIUM 18 UNITS/KG/HR: 1000 INJECTION INTRAVENOUS; SUBCUTANEOUS at 21:04

## 2022-03-01 RX ADMIN — GABAPENTIN 400 MG: 400 CAPSULE ORAL at 16:55

## 2022-03-01 RX ADMIN — WATER 2 G: 1 INJECTION, SOLUTION INTRAMUSCULAR; INTRAVENOUS; SUBCUTANEOUS at 06:06

## 2022-03-01 RX ADMIN — POTASSIUM CHLORIDE 10 MEQ: 7.46 INJECTION, SOLUTION INTRAVENOUS at 07:00

## 2022-03-01 RX ADMIN — ATORVASTATIN CALCIUM 80 MG: 40 TABLET, FILM COATED ORAL at 21:05

## 2022-03-01 RX ADMIN — HYDROMORPHONE HYDROCHLORIDE 1 MG: 1 INJECTION, SOLUTION INTRAMUSCULAR; INTRAVENOUS; SUBCUTANEOUS at 06:14

## 2022-03-01 RX ADMIN — POTASSIUM CHLORIDE 10 MEQ: 7.46 INJECTION, SOLUTION INTRAVENOUS at 08:00

## 2022-03-01 RX ADMIN — POTASSIUM CHLORIDE 10 MEQ: 7.46 INJECTION, SOLUTION INTRAVENOUS at 09:10

## 2022-03-01 RX ADMIN — OXYCODONE AND ACETAMINOPHEN 1 TABLET: 5; 325 TABLET ORAL at 03:45

## 2022-03-01 RX ADMIN — LOSARTAN POTASSIUM 100 MG: 50 TABLET, FILM COATED ORAL at 08:29

## 2022-03-01 RX ADMIN — METOPROLOL TARTRATE 50 MG: 50 TABLET, FILM COATED ORAL at 17:32

## 2022-03-01 RX ADMIN — ASPIRIN 81 MG 81 MG: 81 TABLET ORAL at 08:28

## 2022-03-01 RX ADMIN — OXYCODONE HYDROCHLORIDE AND ACETAMINOPHEN 1 TABLET: 5; 325 TABLET ORAL at 18:30

## 2022-03-01 RX ADMIN — POTASSIUM CHLORIDE 10 MEQ: 7.46 INJECTION, SOLUTION INTRAVENOUS at 10:11

## 2022-03-01 RX ADMIN — DILTIAZEM HYDROCHLORIDE 180 MG: 180 CAPSULE, COATED, EXTENDED RELEASE ORAL at 08:28

## 2022-03-01 RX ADMIN — OXYCODONE AND ACETAMINOPHEN 1 TABLET: 5; 325 TABLET ORAL at 17:32

## 2022-03-01 RX ADMIN — SODIUM CHLORIDE 2.5 MG/HR: 900 INJECTION, SOLUTION INTRAVENOUS at 18:30

## 2022-03-01 RX ADMIN — POTASSIUM CHLORIDE 10 MEQ: 7.46 INJECTION, SOLUTION INTRAVENOUS at 12:00

## 2022-03-01 RX ADMIN — SODIUM CHLORIDE, PRESERVATIVE FREE 10 ML: 5 INJECTION INTRAVENOUS at 06:08

## 2022-03-01 RX ADMIN — GABAPENTIN 400 MG: 400 CAPSULE ORAL at 08:29

## 2022-03-01 RX ADMIN — POTASSIUM CHLORIDE 10 MEQ: 7.46 INJECTION, SOLUTION INTRAVENOUS at 11:00

## 2022-03-01 RX ADMIN — FUROSEMIDE 20 MG: 20 TABLET ORAL at 08:28

## 2022-03-01 NOTE — PROGRESS NOTES
Reason for Admission:  Occlusion of right femoral-popliteal bypass graft ([facility] [T82.898A]                 RUR Score:    14            Plan for utilizing home health: To be determined                       Likelihood of Readmission:   LOW                         Transition of Care Plan:              Initial assessment completed with patient. Cognitive status of patient: oriented to time, place, person and situation. Face sheet information confirmed:  yes. The patient designates his daughter Manjit Hightower  to participate in his discharge plan and to receive any needed information. This patient lives in a home with patient. Patient is not able to navigate steps as needed. Prior to hospitalization, patient was considered to be independent with ADLs/IADLS : yes . Patient has a current ACP document on file: yes      Healthcare Decision Maker:   Primary Decision Maker: Radha Kitchen - Daughter - [phone]    Click here to complete [address] including selection of the Healthcare Decision Maker Relationship (ie \"Primary\")    The daughter will be available to transport patient home upon discharge. The patient already has Lebron Yeny, W/C, and [employer] medical equipment available in the home. Patient is not currently active with home health. Patient has stayed in a skilled nursing facility or rehab. Was  stay within last 60 days : no. This patient is on dialysis :no     Currently, the discharge plan is to be determined    The patient states that he can obtain his medications from the pharmacy, and take his medications as directed. Patient's current insurance is [employer]       Care Management Interventions  PCP Verified by CM: Yes  Palliative Care Criteria Met (RRAT>21 & CHF Dx)?: No  Mode of Transport at Discharge:  Other (see comment) (family)  Transition of Care Consult (CM Consult): Discharge Planning  Support Systems: Child(mile)  Confirm Follow Up Transport: Family  Discharge Location  Patient Expects to be Discharged to[de-identified] Unable to determine at this time        CHRISTOPHER Gee RN  Care Management  Pager: [phone]

## 2022-03-01 NOTE — PROGRESS NOTES
1130-Bedside shift change report given by Coretta Bro RN. Report included the following information SBAR, ED Summary, OR Summary, Procedure Summary, Intake/Output, MAR, Recent Results, Cardiac Rhythm ., Alarm Parameters , Procedure Verification and Quality Measures. Ian Mason 3046 Pamela Torres MD) regarding pts pain. Pt did not want IV medication at this time. Physician notified. Pt given one percocet for future order of 1-2 percocet every 4 hours. 1840- Removed pts lopez. 1910- Bedside shift change report given to Armando Callahan RN. Report included the following information SBAR, ED Summary, OR Summary, Procedure Summary, Intake/Output, MAR, Recent Results, Med Rec Status, Cardiac Rhythm ., Alarm Parameters , Procedure Verification and Quality Measures.

## 2022-03-01 NOTE — PROGRESS NOTES
0700: Bedside and Verbal shift change report given to bia by Matt Ramires. Martha Vann Report included the following information OR Summary, Procedure Summary, Intake/Output, MAR, Accordion, Recent Results, Med Rec Status, Cardiac Rhythm ., Alarm Parameters , Quality Measures and Dual Neuro Assessment. Pt noted to be in Afib with RvR, ventricular rate 140-170bpm, Dr. Rahul Thorpe notified- Metoprolol 5mg IV given, see MAR. Cardiology consulted regarding rhythm. AM Potassium level 3.1, IV KCL ordered-repleted as per MD order. Mg level 1.7, 2grams MgSulfate IVPB administered with recheck of Mg and K+ lab.   0800: Atrial fibrillation with ventricular response rate 100-120bpm.  1046: JUNE Arthur at bedside. 1300: IV Cardizem infusion ordered- held since Afib ventricular rate 96bpm.  1400: Pt called nurse to room- complaining of \"bladder fullness\", noted lopez catheter kinked in the lopez gay. Lopez catheter unkinked, clear light yellow urine flowing into lopez bag, pt stated \" what a relief\".

## 2022-03-01 NOTE — PROGRESS NOTES
Pt increasingly tachycardic throughout the night, sustaining in the 140s-160s. EKG obtained. Pt otherwise asymptomatic, no chest pain, dizziness, or hypotension noted. MD See Navarro made aware. Pts potassium this AM 3.1. Potassium replacement protocol ordered. Pt due for metoprolol at 9am. Okay to give now as per MD See Navarro. Magnesium added on to AM labs. Potassium IV administered as per MAR. Will report to day nurse.

## 2022-03-01 NOTE — PROGRESS NOTES
conducted an initial consultation and Spiritual Assessment for Toro Pierre, who is a 59 y.o.,male. Patients Primary Language is: [place]. According to the patients EMR Presybeterian Affiliation is: No preference.      The reason the Patient came to the hospital is:   Patient Active Problem List    Diagnosis Date Noted    Occlusion of right femoral-popliteal bypass graft ([address] 02/28/2022    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[address] 03/31/2021    Skin ulcer of malleolar area of right ankle ([address]     Prolonged Q-T interval on ECG 08/19/2017    Adverse effect of amiodarone 08/19/2017    Status post above-knee amputation of left lower extremity ([address] 08/18/2017    Aftercare for amputation stump 08/18/2017    Ischemic rest pain of lower extremity 08/14/2017    Chronic ulcer of right heel ([address] 08/08/2017    Chronic anemia     Acute blood loss as cause of postoperative anemia 07/25/2017    Aftercare following surgery of the circulatory system 07/25/2017    Impaired mobility and ADLs 07/24/2017    Infection of vascular bypass graft ([address] 07/24/2017    Moderate to severe pulmonary hypertension ([address] 07/23/2017    Pseudoaneurysm of femoral artery ([address] 06/27/2017    History of vitamin D deficiency 04/22/2017    Status post femoral-popliteal bypass surgery 04/21/2017    Ischemic cardiomyopathy     Chronic systolic heart failure (HCC)     Carotid artery disease ([address]     Dyslipidemia     History of cardioversion 04/18/2017    Paroxysmal atrial fibrillation ([address]     Critical ischemia of lower extremity ([address] 04/10/2017    Euthyroid sick syndrome 04/06/2017    Coronary artery disease involving native coronary artery of native heart 03/15/2017    Aortoiliac occlusive disease ([address] 01/25/2017    Atherosclerosis of native artery of both lower extremities with intermittent claudication ([address] 01/25/2017    Peripheral artery disease ([address] 01/25/2017    Hereditary peripheral neuropathy 11/15/2016    Erectile dysfunction 07/05/2016    Spinal stenosis of lumbar region with radiculopathy 05/04/2015    Benign hypertensive heart disease with systolic congestive heart failure, NYHA class 2 ([address] 01/26/2015    DDD (degenerative disc disease), lumbar 01/26/2015        The  provided the following Interventions:   attempted to Initiate a relationship of care and support. With patient in room [id number] [id number] but found the patient resting peacefully at this time and unable to communicate  after having surgery done yesterday. Provided information about Spiritual Care Services. Offered prayer and assurance of continued prayers on patients behalf. Assessment:  Patient does not have any Sikhism/cultural needs that will affect patients preferences in health care. There are no further spiritual or Sikhism issues which require Spiritual Care Services interventions at this time. Plan:  Chaplains will continue to follow and will provide pastoral care on an as needed/requested basis    . Julito Nieves   Spiritual Care   [phone]

## 2022-03-01 NOTE — PROGRESS NOTES
Problem: Pressure Injury - Risk of  Goal: *Prevention of pressure injury  Description: Document Efrem Scale and appropriate interventions in the flowsheet. Outcome: Progressing Towards Goal  Note: Pressure Injury Interventions: Activity Interventions: Assess need for specialty bed,Chair cushion,Increase time out of bed,Pressure redistribution bed/mattress(bed type),PT/OT evaluation    Mobility Interventions: HOB 30 degrees or less,Pressure redistribution bed/mattress (bed type),Turn and reposition approx. every two hours(pillow and wedges)    Nutrition Interventions: Document food/fluid/supplement intake,Offer support with meals,snacks and hydration,Discuss nutritional consult with provider                     Problem: Patient Education: Go to Patient Education Activity  Goal: Patient/Family Education  Outcome: Progressing Towards Goal     Problem: Falls - Risk of  Goal: *Absence of Falls  Description: Document Sari Chinchilla Fall Risk and appropriate interventions in the flowsheet.   Outcome: Progressing Towards Goal  Note: Fall Risk Interventions:  Mobility Interventions: Bed/chair exit alarm,Communicate number of staff needed for ambulation/transfer,OT consult for ADLs,Patient to call before getting OOB,PT Consult for mobility concerns,PT Consult for assist device competence,Strengthening exercises (ROM-active/passive)         Medication Interventions: Assess postural VS orthostatic hypotension,Bed/chair exit alarm,Evaluate medications/consider consulting pharmacy,Patient to call before getting OOB,Teach patient to arise slowly    Elimination Interventions: Patient to call for help with toileting needs,Call light in reach,Bed/chair exit alarm,Urinal in reach    History of Falls Interventions: Bed/chair exit alarm,Consult care management for discharge planning,Door open when patient unattended,Investigate reason for fall,Room close to nurse's station,Assess for delayed presentation/identification of injury for 48 hrs (comment for end date),Vital signs minimum Q4HRs X 24 hrs (comment for end date)         Problem: Patient Education: Go to Patient Education Activity  Goal: Patient/Family Education  Outcome: Progressing Towards Goal    Pt in bed. HR still elevated. MD Velazquez at bedside. Potassium infusing. PRN metoprolol given. Pt still asymptomatic. Report given to BEAU Lim.

## 2022-03-01 NOTE — PROGRESS NOTES
Pod 1 right fem graft to ant tib bypass, sites clean  Has doppler pulse now, ischemic pain resolved  Gangrene right 4th and 5th toe, two foot ulcers  Dr Dori Callahan to see  Cardiology to see regarding A Fib

## 2022-03-01 NOTE — CONSULTS
Cardiology Initial Patient Referral Note    Cardiology referral request from Dr. Marck Ingram for evaluation and management/treatment of atrial fibrillation with rapid ventricular response. Date of  Admission: 2/28/2022  9:37 AM   Primary Care Physician:  Tenzin Bowen MD    Attending Cardiologist: Dr. Pop Negrete:     Hospital Problems  Date Reviewed: 12/15/2021          Codes Class Noted POA    Occlusion of right femoral-popliteal bypass graft [facility]) ICD-10-CM: I17.111S  ICD-9-CM: 996.74  2/28/2022 Unknown              -Occlusion of right femoral-popliteal bypass graft now s/p right axillofemoral graft bypass to right anterior tibial bypass with cadaver vein 2/28/22.  -Paroxysmal atrial fibrillation s/p remote cardioversion now appears persistent with RVR this admission. On CCB, BB and Xarelto as outpatient.  -Hypokalemia. Continue replacement as needed. -PAD s/p previous right fempop bypass and previous left AKA. -CAD s/p Cx and RCA PCI 2017.  -H/o Lali Mac with improved EF 55-60% 4/2021.  -Hypertension.  -Hyperlipidemia. Primary cardiologist Dr. Yas Stallings. Echo 4/2021  · LV: Estimated LVEF is 55 - 60%. Visually measured ejection fraction. Normal cavity size, wall thickness and systolic function (ejection fraction normal). Wall motion: normal. Age-appropriate left ventricular diastolic function. · AV: Probably trileaflet aortic valve. · RV: Not well visualized. · Pericardium: Pericardial fat pad present. Plan: Will start cardizem drip for improved HR control. With plans to restart home BB and CCB. Will hold lasix and lisinopril to allow for uptitration of AV jorge medications. Will defer anticoagulation to vascular team, currently on ASA, on xarelto as outpatient. Would continue electrolyte replacement as needed. Would continue to follow Hgb closely. Will appreciate further podiatry evaluation. Can get follow up echocardiogram for completeness once HR improves.     Plan as above. Discussed with patient and nurses. History of Present Illness: This is a 59 y.o. male admitted for Occlusion of right femoral-popliteal bypass graft ([address] Zenaida Franz. Patient complains of: Leg pain. Patient is a 59year old male with history of HTN, hyperlipidemia, CAD, PAD, h/o NICMY EF improves. Patient presented to Dr. Troy Jaramillo with right leg pain. Patient had occlusion of right femoral-popliteal bypass graft and is now s/p right axillofemoral graft bypass to right anterior tibial bypass with cadaver vein 2/28/22. Patient is noted to have afib with RVR this AM. Patient has a known history of afib on CCB, BB and xarelto as outpatient. Patient is asymptomatic with HR 130s with activity and HR 110s at rest during exam this AM after resumption of home CCB and BB.      Cardiac risk factors: dyslipidemia, obesity, male gender, hypertension      Review of Symptoms:  Except as stated above include:  Constitutional:  negative  Respiratory:  negative  Cardiovascular:  Denies CP, palp, SOB  Gastrointestinal: negative  Genitourinary:  negative  Musculoskeletal:  Negative  Neurological:  Negative  Dermatological:  Negative  Endocrinological: Negative  Psychological:  Negative       Past Medical History:     Past Medical History:   Diagnosis Date    Acute blood loss as cause of postoperative anemia 4/4/2017    Anticoagulated on Coumadin     Aortoiliac occlusive disease ([address] 1/25/2017    Atherosclerosis of native artery of both lower extremities with intermittent claudication ([address] 1/25/2017    Benign hypertensive heart disease with systolic congestive heart failure, NYHA class 2 ([address] 1/26/2015    Carotid artery disease (HCC)     Chronic anemia     Chronic systolic heart failure (HCC)     Chronic ulcer of right foot ([address] 4/4/2017    Chronic ulcer of right heel ([address] 8/8/2017    Coronary artery disease involving native coronary artery of native heart 3/15/2017    Successful stenting of Cx (Xience LESLEY) and RCA (Xience LESLEY) to 0% by Dr. Yas Stallings on 3/15/17.  DDD (degenerative disc disease), lumbar 1/26/2015    Dyslipidemia     Erectile dysfunction 7/5/2016    Euthyroid sick syndrome 4/6/2017    Hereditary peripheral neuropathy 11/15/2016    History of cardioversion 04/18/2017    S/P Synchronized external cardioversion (4/18/2017 - Dr. Shmuel Payne)    History of vitamin D deficiency 4/22/2017    Vitamin D 25-Hydroxy (4/22/2017) = 12.0; Vitamin D 25-Hydroxy (5/26/2017) = 38.7    Infection of vascular bypass graft ([facility] 7/24/2017    Left lower extremity    Ischemic cardiomyopathy     Moderate to severe pulmonary hypertension ([address] 07/23/2017    Paroxysmal atrial fibrillation (HCC)     Peripheral artery disease ([address] 1/25/2017    Skin ulcer of malleolar area of right ankle ([address]     Spinal stenosis of lumbar region with radiculopathy 5/4/2015    Dr. Marin Recinos         Social History:     Social History     Socioeconomic History    Marital status: LEGALLY    Tobacco Use    Smoking status: Former Smoker    Smokeless tobacco: Never Used    Tobacco comment: quit in 2011   Substance and Sexual Activity    Alcohol use: Yes     Alcohol/week: 2.0 standard drinks     Types: 2 Cans of beer per week     Comment: 10 cans of beer a month    Drug use: Yes     Types: Marijuana     Comment: occasionally-last used 4/17    Sexual activity: Yes     Partners: Female        Family History:     Family History   Problem Relation Age of Onset    Heart Disease Father         Medications: Allergies   Allergen Reactions    Morphine Other (comments)     Patient gets confused with morphine.         Current Facility-Administered Medications   Medication Dose Route Frequency    ELECTROLYTE REPLACEMENT PROTOCOL - Potassium Standard Dosing   1 Each Other PRN    ELECTROLYTE REPLACEMENT PROTOCOL - Magnesium   1 Each Other PRN    potassium chloride 10 mEq in 100 ml IVPB  10 mEq IntraVENous Q1H    aspirin chewable tablet 81 mg  81 mg Oral DAILY    atorvastatin (LIPITOR) tablet 80 mg  80 mg Oral QHS    dilTIAZem ER (CARDIZEM CD) capsule 180 mg  180 mg Oral DAILY    furosemide (LASIX) tablet 20 mg  20 mg Oral DAILY    gabapentin (NEURONTIN) capsule 400 mg  400 mg Oral TID    losartan (COZAAR) tablet 100 mg  100 mg Oral DAILY    metoprolol tartrate (LOPRESSOR) tablet 50 mg  50 mg Oral BID    dextrose 5% lactated ringers infusion  75 mL/hr IntraVENous CONTINUOUS    oxyCODONE-acetaminophen (PERCOCET) 5-325 mg per tablet 1 Tablet  1 Tablet Oral Q4H PRN    HYDROmorphone (DILAUDID) syringe 0.5-1 mg  0.5-1 mg IntraVENous Q2H PRN    naloxone (NARCAN) injection 0.4 mg  0.4 mg IntraVENous PRN    ceFAZolin (ANCEF) 2 g in sterile water (preservative free) 20 mL IV syringe  2 g IntraVENous Q8H    metoprolol (LOPRESSOR) injection 5 mg  5 mg IntraVENous Q6H PRN         Physical Exam:     Visit Vitals  /84   Pulse (!) 172   Temp 99.4 °F (37.4 °C)   Resp 25   Ht 5' 10\" (1.778 m)   Wt 177 lb 9.6 oz (80.6 kg)   SpO2 93%   BMI 25.48 kg/m²       TELE: AFIB    BP Readings from Last 3 Encounters:   03/01/22 125/84   01/06/22 105/64   12/21/21 118/74     Pulse Readings from Last 3 Encounters:   03/01/22 (!) 172   01/06/22 72   12/21/21 95     Wt Readings from Last 3 Encounters:   02/28/22 177 lb 9.6 oz (80.6 kg)   01/06/22 180 lb (81.6 kg)   12/21/21 188 lb (85.3 kg)       General:  alert, cooperative, no distress, appears stated age  Neck:  no JVD  Lungs:  clear to auscultation bilaterally  Heart:  irregularly irregular rhythm  Abdomen:  abdomen is soft   Extremities: 1+ edema RLE, L AKA  Skin: Warm and dry.    Neuro: alert, oriented x3, affect appropriate  Psych: non focal     Data Review:     Recent Labs     03/01/22  0331 02/28/22  1030   WBC 9.0 11.7   HGB 8.3* 10.2*   HCT 26.1* 30.8*    451*     Recent Labs     03/01/22  0331 02/28/22  1030    140   K 3.1* 3.4*   * 110   CO2 24 23   * 96   BUN 8 14   CREA 0.71 0.89   CA 7.9* 9.2   MG 1.7  --        Results for orders placed or performed during the hospital encounter of 12/15/21   EKG, 12 LEAD, INITIAL   Result Value Ref Range    Ventricular Rate 108 BPM    Atrial Rate 78 BPM    QRS Duration 88 ms    Q-T Interval 310 ms    QTC Calculation (Bezet) 415 ms    Calculated R Axis 19 degrees    Calculated T Axis -66 degrees    Diagnosis       Atrial fibrillation with rapid ventricular response  Nonspecific ST abnormality , probably digitalis effect  Abnormal QRS-T angle, consider primary T wave abnormality  Abnormal ECG  When compared with ECG of 05-APR-2021 22:37,  Vent. rate has decreased BY  63 BPM  ST less depressed in Lateral leads  T wave inversion no longer evident in Lateral leads  Confirmed by Carmen Gastelum ([id number]) on 12/15/2021 6:50:46 PM     Results for orders placed or performed in visit on 10/27/20   AMB POC EKG ROUTINE W/ 12 LEADS, INTER & REP    Narrative    Normal sinus rhythm, rate 81. Occasional PVCs. Compared to the EKG of October 1, 2020 there was no significant herbal change.        All Cardiac Markers in the last 24 hours:  No results found for: CPK, CK, CKMMB, CKMB, RCK3, CKMBT, CKNDX, CKND1, LEO, TROPT, TROIQ, DEWAYNE, TROPT, TNIPOC, BNP, BNPP    Last Lipid:    Lab Results   Component Value Date/Time    Cholesterol, total 83 12/15/2021 07:08 AM    HDL Cholesterol 22 (L) 12/15/2021 07:08 AM    LDL, calculated 43.2 12/15/2021 07:08 AM    Triglyceride 89 12/15/2021 07:08 AM    CHOL/HDL Ratio 3.8 12/15/2021 07:08 AM       Cardiographics:     EKG Results     Procedure 720 Value Units Date/Time    EKG, 12 LEAD, SUBSEQUENT [[id number]] Collected: 03/01/22 0625    Order Status: Completed Updated: 03/01/22 4303     Ventricular Rate 142 BPM      QRS Duration 88 ms      Q-T Interval 314 ms      QTC Calculation (Bezet) 483 ms      Calculated R Axis -2 degrees      Calculated T Axis -86 degrees      Diagnosis --     ** Critical Test Result: High HR  Atrial fibrillation with rapid ventricular response  Nonspecific ST and T wave abnormality  Abnormal ECG  When compared with ECG of 15-DEC-2021 08:32,  T wave inversion now evident in Lateral leads          03/31/21    ECHO ADULT COMPLETE 04/02/2021 4/2/2021    Interpretation Summary  · LV: Estimated LVEF is 55 - 60%. Visually measured ejection fraction. Normal cavity size, wall thickness and systolic function (ejection fraction normal). Wall motion: normal. Age-appropriate left ventricular diastolic function. · AV: Probably trileaflet aortic valve. · RV: Not well visualized. · Pericardium: Pericardial fat pad present. Signed by: Brandy Ko MD on 4/2/2021  2:32 PM      10/27/20    NUCLEAR CARDIAC STRESS TEST 11/10/2020 11/10/2020    Interpretation Summary  · Baseline ECG: Normal sinus rhythm, non-specific ST-T wave abnormalities. · Gated SPECT: Left ventricular function post-stress was normal. Calculated ejection fraction is 51%. The TID ratio is 1.03.  · Inconclusive stress test.  · Left ventricular perfusion is probably normal.  · Myocardial perfusion imaging supports a low risk stress test.    Signed by: Shannon Blanton MD on 11/10/2020 12:53 PM    03/31/21    INVASIVE VASCULAR PROCEDURE 03/31/2021 3/31/2021    Narrative  See op note    Signed by: Lita Wong MD on 3/31/2021  5:56 PM      XR Results (most recent):  Results from [place] encounter on 01/06/22    [facility] XR TECHNOLOGIST SERVICE    Narrative  Fluoroscopy was provided for a bundled exam for documentation purposes. Impression  Please see above. FLUORO TIME: 00.45    AJB        Signed By: JUNE Head     March 1, 2022    I have personally and independently evaluated and examined the patient. All relevant labs and testing data are reviewed. I have personally reviewed all the diagnostic labs, available EKG imaging x-rays and other diagnostic data and incorporated them into my care. I am referencing APC's note.  I participated in medical decision making. Care plan discussed and updated after review. More than 50% time spent reviewing data, examining patient and recommending assessment and plan.   Enrico Marcelino MD

## 2022-03-02 ENCOUNTER — APPOINTMENT (OUTPATIENT)
Dept: GENERAL RADIOLOGY | Age: 62
DRG: 240 | End: 2022-03-02
Attending: PODIATRIST
Payer: COMMERCIAL

## 2022-03-02 LAB
ANION GAP SERPL CALC-SCNC: 3 MMOL/L (ref 3–18)
APTT PPP: 102.5 SEC (ref 23–36.4)
APTT PPP: 59.4 SEC (ref 23–36.4)
BASOPHILS # BLD: 0 K/UL (ref 0–0.1)
BASOPHILS NFR BLD: 0 % (ref 0–2)
BUN SERPL-MCNC: 7 MG/DL (ref 7–18)
BUN/CREAT SERPL: 10 (ref 12–20)
CALCIUM SERPL-MCNC: 8.1 MG/DL (ref 8.5–10.1)
CHLORIDE SERPL-SCNC: 111 MMOL/L (ref 100–111)
CO2 SERPL-SCNC: 25 MMOL/L (ref 21–32)
CREAT SERPL-MCNC: 0.72 MG/DL (ref 0.6–1.3)
DIFFERENTIAL METHOD BLD: ABNORMAL
EOSINOPHIL # BLD: 0.1 K/UL (ref 0–0.4)
EOSINOPHIL NFR BLD: 1 % (ref 0–5)
ERYTHROCYTE [DISTWIDTH] IN BLOOD BY AUTOMATED COUNT: 16.5 % (ref 11.6–14.5)
GLUCOSE SERPL-MCNC: 96 MG/DL (ref 74–99)
HCT VFR BLD AUTO: 26.7 % (ref 36–48)
HGB BLD-MCNC: 8.6 G/DL (ref 13–16)
IMM GRANULOCYTES # BLD AUTO: 0 K/UL (ref 0–0.04)
IMM GRANULOCYTES NFR BLD AUTO: 0 % (ref 0–0.5)
LYMPHOCYTES # BLD: 2 K/UL (ref 0.9–3.6)
LYMPHOCYTES NFR BLD: 21 % (ref 21–52)
MAGNESIUM SERPL-MCNC: 2 MG/DL (ref 1.6–2.6)
MCH RBC QN AUTO: 29.5 PG (ref 24–34)
MCHC RBC AUTO-ENTMCNC: 32.2 G/DL (ref 31–37)
MCV RBC AUTO: 91.4 FL (ref 78–100)
MONOCYTES # BLD: 1.1 K/UL (ref 0.05–1.2)
MONOCYTES NFR BLD: 12 % (ref 3–10)
NEUTS SEG # BLD: 6.2 K/UL (ref 1.8–8)
NEUTS SEG NFR BLD: 66 % (ref 40–73)
NRBC # BLD: 0 K/UL (ref 0–0.01)
NRBC BLD-RTO: 0 PER 100 WBC
PLATELET # BLD AUTO: 360 K/UL (ref 135–420)
PMV BLD AUTO: 8.9 FL (ref 9.2–11.8)
POTASSIUM SERPL-SCNC: 3.3 MMOL/L (ref 3.5–5.5)
POTASSIUM SERPL-SCNC: 4 MMOL/L (ref 3.5–5.5)
RBC # BLD AUTO: 2.92 M/UL (ref 4.35–5.65)
SODIUM SERPL-SCNC: 139 MMOL/L (ref 136–145)
WBC # BLD AUTO: 9.4 K/UL (ref 4.6–13.2)

## 2022-03-02 PROCEDURE — 74011250637 HC RX REV CODE- 250/637: Performed by: INTERNAL MEDICINE

## 2022-03-02 PROCEDURE — 2709999900 HC NON-CHARGEABLE SUPPLY

## 2022-03-02 PROCEDURE — 85025 COMPLETE CBC W/AUTO DIFF WBC: CPT

## 2022-03-02 PROCEDURE — 74011250636 HC RX REV CODE- 250/636: Performed by: SURGERY

## 2022-03-02 PROCEDURE — 85730 THROMBOPLASTIN TIME PARTIAL: CPT

## 2022-03-02 PROCEDURE — 74011250637 HC RX REV CODE- 250/637: Performed by: SURGERY

## 2022-03-02 PROCEDURE — 36415 COLL VENOUS BLD VENIPUNCTURE: CPT

## 2022-03-02 PROCEDURE — 74011250637 HC RX REV CODE- 250/637: Performed by: PHYSICIAN ASSISTANT

## 2022-03-02 PROCEDURE — 74011000250 HC RX REV CODE- 250: Performed by: SURGERY

## 2022-03-02 PROCEDURE — 77030041076 HC DRSG AG OPTICELL MDII -A

## 2022-03-02 PROCEDURE — 83735 ASSAY OF MAGNESIUM: CPT

## 2022-03-02 PROCEDURE — 74011000258 HC RX REV CODE- 258: Performed by: INTERNAL MEDICINE

## 2022-03-02 PROCEDURE — 77030037878 HC DRSG MEPILEX >48IN BORD MOLN -B

## 2022-03-02 PROCEDURE — 84132 ASSAY OF SERUM POTASSIUM: CPT

## 2022-03-02 PROCEDURE — 65620000000 HC RM CCU GENERAL

## 2022-03-02 PROCEDURE — 99232 SBSQ HOSP IP/OBS MODERATE 35: CPT | Performed by: INTERNAL MEDICINE

## 2022-03-02 PROCEDURE — 74011250636 HC RX REV CODE- 250/636: Performed by: INTERNAL MEDICINE

## 2022-03-02 PROCEDURE — 73620 X-RAY EXAM OF FOOT: CPT

## 2022-03-02 RX ORDER — HEPARIN SODIUM 1000 [USP'U]/ML
INJECTION, SOLUTION INTRAVENOUS; SUBCUTANEOUS
Status: DISCONTINUED
Start: 2022-03-02 | End: 2022-03-02 | Stop reason: WASHOUT

## 2022-03-02 RX ORDER — WATER FOR INJECTION,STERILE
VIAL (ML) INJECTION
Status: DISPENSED
Start: 2022-03-02 | End: 2022-03-02

## 2022-03-02 RX ORDER — POTASSIUM CHLORIDE 7.45 MG/ML
10 INJECTION INTRAVENOUS
Status: DISPENSED | OUTPATIENT
Start: 2022-03-02 | End: 2022-03-02

## 2022-03-02 RX ORDER — DILTIAZEM HYDROCHLORIDE 60 MG/1
60 TABLET, FILM COATED ORAL EVERY 6 HOURS
Status: DISCONTINUED | OUTPATIENT
Start: 2022-03-02 | End: 2022-03-03

## 2022-03-02 RX ORDER — HEPARIN SODIUM 1000 [USP'U]/ML
40 INJECTION, SOLUTION INTRAVENOUS; SUBCUTANEOUS ONCE
Status: COMPLETED | OUTPATIENT
Start: 2022-03-02 | End: 2022-03-02

## 2022-03-02 RX ORDER — HEPARIN SODIUM 1000 [USP'U]/ML
3200 INJECTION, SOLUTION INTRAVENOUS; SUBCUTANEOUS ONCE
Status: DISCONTINUED | OUTPATIENT
Start: 2022-03-02 | End: 2022-03-02

## 2022-03-02 RX ORDER — DILTIAZEM HYDROCHLORIDE 30 MG/1
30 TABLET, FILM COATED ORAL EVERY 6 HOURS
Status: DISCONTINUED | OUTPATIENT
Start: 2022-03-02 | End: 2022-03-02

## 2022-03-02 RX ADMIN — HEPARIN SODIUM 20 UNITS/KG/HR: 1000 INJECTION INTRAVENOUS; SUBCUTANEOUS at 05:29

## 2022-03-02 RX ADMIN — DILTIAZEM HYDROCHLORIDE 60 MG: 60 TABLET, FILM COATED ORAL at 23:59

## 2022-03-02 RX ADMIN — POTASSIUM CHLORIDE 10 MEQ: 7.46 INJECTION, SOLUTION INTRAVENOUS at 04:17

## 2022-03-02 RX ADMIN — HEPARIN SODIUM 20 UNITS/KG/HR: 1000 INJECTION INTRAVENOUS; SUBCUTANEOUS at 14:40

## 2022-03-02 RX ADMIN — GABAPENTIN 400 MG: 400 CAPSULE ORAL at 16:01

## 2022-03-02 RX ADMIN — ASPIRIN 81 MG 81 MG: 81 TABLET ORAL at 08:18

## 2022-03-02 RX ADMIN — OXYCODONE AND ACETAMINOPHEN 2 TABLET: 5; 325 TABLET ORAL at 16:01

## 2022-03-02 RX ADMIN — SODIUM CHLORIDE, PRESERVATIVE FREE 10 ML: 5 INJECTION INTRAVENOUS at 21:50

## 2022-03-02 RX ADMIN — ATORVASTATIN CALCIUM 80 MG: 40 TABLET, FILM COATED ORAL at 21:49

## 2022-03-02 RX ADMIN — GABAPENTIN 400 MG: 400 CAPSULE ORAL at 21:49

## 2022-03-02 RX ADMIN — DILTIAZEM HYDROCHLORIDE 30 MG: 30 TABLET, FILM COATED ORAL at 11:24

## 2022-03-02 RX ADMIN — POTASSIUM CHLORIDE 10 MEQ: 7.46 INJECTION, SOLUTION INTRAVENOUS at 05:32

## 2022-03-02 RX ADMIN — DILTIAZEM HYDROCHLORIDE 60 MG: 60 TABLET, FILM COATED ORAL at 17:07

## 2022-03-02 RX ADMIN — HEPARIN SODIUM 3220 UNITS: 1000 INJECTION, SOLUTION INTRAVENOUS; SUBCUTANEOUS at 05:30

## 2022-03-02 RX ADMIN — SODIUM CHLORIDE, PRESERVATIVE FREE 10 ML: 5 INJECTION INTRAVENOUS at 16:01

## 2022-03-02 RX ADMIN — GABAPENTIN 400 MG: 400 CAPSULE ORAL at 08:18

## 2022-03-02 RX ADMIN — SODIUM CHLORIDE, PRESERVATIVE FREE 10 ML: 5 INJECTION INTRAVENOUS at 06:04

## 2022-03-02 RX ADMIN — OXYCODONE AND ACETAMINOPHEN 2 TABLET: 5; 325 TABLET ORAL at 06:04

## 2022-03-02 RX ADMIN — OXYCODONE AND ACETAMINOPHEN 2 TABLET: 5; 325 TABLET ORAL at 21:54

## 2022-03-02 RX ADMIN — METOPROLOL TARTRATE 50 MG: 50 TABLET, FILM COATED ORAL at 17:07

## 2022-03-02 RX ADMIN — POTASSIUM CHLORIDE 10 MEQ: 7.46 INJECTION, SOLUTION INTRAVENOUS at 07:00

## 2022-03-02 RX ADMIN — HEPARIN SODIUM 20 UNITS/KG/HR: 1000 INJECTION INTRAVENOUS; SUBCUTANEOUS at 19:21

## 2022-03-02 RX ADMIN — OXYCODONE AND ACETAMINOPHEN 2 TABLET: 5; 325 TABLET ORAL at 11:40

## 2022-03-02 RX ADMIN — POTASSIUM CHLORIDE 10 MEQ: 7.46 INJECTION, SOLUTION INTRAVENOUS at 08:00

## 2022-03-02 RX ADMIN — SODIUM CHLORIDE 10 MG/HR: 900 INJECTION, SOLUTION INTRAVENOUS at 09:00

## 2022-03-02 RX ADMIN — METOPROLOL TARTRATE 50 MG: 50 TABLET, FILM COATED ORAL at 08:18

## 2022-03-02 RX ADMIN — SODIUM CHLORIDE 5 MG/HR: 900 INJECTION, SOLUTION INTRAVENOUS at 06:18

## 2022-03-02 NOTE — PROGRESS NOTES
Cardiology Progress Note    Admit Date: 2/28/2022  Attending Cardiologist: Dr. Janee Marie:     -Occlusion of right femoral-popliteal bypass graft now s/p right axillofemoral graft bypass to right anterior tibial bypass with cadaver vein 2/28/22.  -Paroxysmal atrial fibrillation s/p remote cardioversion now appears persistent with RVR this admission. On CCB, BB and Xarelto as outpatient.  -Hypokalemia. Continue replacement as needed. -PAD s/p previous right fempop bypass and previous left AKA. -CAD s/p Cx and RCA PCI 2017.  -H/o Osorio Patron with improved EF 55-60% 4/2021.  -Hypertension. Soft. -Hyperlipidemia. On statin.     Primary cardiologist Dr. Tiff Thacker.     Echo 4/2021  · LV: Estimated LVEF is 55 - 60%. Visually measured ejection fraction. Normal cavity size, wall thickness and systolic function (ejection fraction normal). Wall motion: normal. Age-appropriate left ventricular diastolic function. · AV: Probably trileaflet aortic valve. · RV: Not well visualized. · Pericardium: Pericardial fat pad present.       Plan:       I saw, evaluated, interviewed and examined the patient personally. Patient denies any cardiac complaint. Denies any chest pain or shortness of breath. Hemodynamically stable. Left above-knee amputation. Right-sided slight edema  Try to wean down Cardizem drip. At home patient is on total 300 mg of long-acting Cardizem, 180 in the morning and 120 in the evening  We will start at least 60 mg Cardizem short acting every 6 hours with holding parameters and eventually changed to long-acting  Anticoagulation per vascular team    Significant time spent in reviewing the case, multiple EMR databases, physician notes, reviewing pertinent labs and imaging studies  I spent significant amount of time for medical decision making and updated history, and other providers assessments as well. I personally agree with the findings as stated and the plan as documented.   I saw, examined, and evaluated this patient and performed the substantive portion of the encounter for > 50% of the time including extensive history, physical exam, assessment and plan    Patrick , MD       Patient remains on cardizem drip at 7.5. HR 90s-low 100s at rest, 130s with activity. Will start short acting cardizem as BP allows. Will continue BB. Will wean cardizem drip as HR allows. Can consider digoxin once potassium is further replaced. Would continue electrolyte replacement as needed. Lasix and losartan currently on hold given borderline BP and to allow for uptitration of AV jorge medications. Can consider addition of aldactone if BP will allow. Awaiting podiatry evaluation. Currently continued on heparin infusion, plan to resume xarelto if no further procedures planned. Subjective:     No CP.  No SOB    Objective:      Patient Vitals for the past 8 hrs:   Temp Pulse Resp BP SpO2   03/02/22 0800 98.1 °F (36.7 °C) (!) 138 27 -- 93 %   03/02/22 0700 -- 89 18 101/74 93 %   03/02/22 0600 -- (!) 102 19 126/77 100 %   03/02/22 0500 -- 88 12 -- 98 %   03/02/22 0400 97.7 °F (36.5 °C) 96 15 118/86 93 %   03/02/22 0300 -- 80 14 121/87 93 %   03/02/22 0200 -- 83 18 -- 91 %         Patient Vitals for the past 96 hrs:   Weight   02/28/22 1104 177 lb 9.6 oz (80.6 kg)       TELE: AFIB               Current Facility-Administered Medications   Medication Dose Route Frequency Last Admin    sterile water (preservative free) injection          dilTIAZem IR (CARDIZEM) tablet 30 mg  30 mg Oral Q6H      ELECTROLYTE REPLACEMENT PROTOCOL - Potassium Standard Dosing   1 Each Other PRN      ELECTROLYTE REPLACEMENT PROTOCOL - Magnesium   1 Each Other PRN      sodium chloride (NS) flush 10 mL  10 mL IntraVENous Q8H 10 mL at 03/02/22 0604    dilTIAZem (CARDIZEM) 100 mg in 0.9% sodium chloride (MBP/ADV) 100 mL infusion  0-15 mg/hr IntraVENous TITRATE 10 mg/hr at 03/02/22 0900    oxyCODONE-acetaminophen (PERCOCET) 5-325 mg per tablet 2 Tablet  2 Tablet Oral Q4H PRN 2 Tablet at 03/02/22 0604    heparin 25,000 units in  ml infusion  18-36 Units/kg/hr IntraVENous TITRATE 20 Units/kg/hr at 03/02/22 0717    aspirin chewable tablet 81 mg  81 mg Oral DAILY 81 mg at 03/02/22 0818    atorvastatin (LIPITOR) tablet 80 mg  80 mg Oral QHS 80 mg at 03/01/22 2105    [Held by provider] dilTIAZem ER (CARDIZEM CD) capsule 180 mg  180 mg Oral DAILY 180 mg at 03/01/22 0828    [Held by provider] furosemide (LASIX) tablet 20 mg  20 mg Oral DAILY 20 mg at 03/01/22 2552    gabapentin (NEURONTIN) capsule 400 mg  400 mg Oral  mg at 03/02/22 0818    [Held by provider] losartan (COZAAR) tablet 100 mg  100 mg Oral DAILY 100 mg at 03/01/22 0829    metoprolol tartrate (LOPRESSOR) tablet 50 mg  50 mg Oral BID 50 mg at 03/02/22 0818    oxyCODONE-acetaminophen (PERCOCET) 5-325 mg per tablet 1 Tablet  1 Tablet Oral Q4H PRN 1 Tablet at 03/01/22 1732    HYDROmorphone (DILAUDID) syringe 0.5-1 mg  0.5-1 mg IntraVENous Q2H PRN 1 mg at 03/01/22 8855    naloxone (NARCAN) injection 0.4 mg  0.4 mg IntraVENous PRN      metoprolol (LOPRESSOR) injection 5 mg  5 mg IntraVENous Q6H PRN 5 mg at 03/01/22 0731         Intake/Output Summary (Last 24 hours) at 3/2/2022 7781  Last data filed at 3/2/2022 0847  Gross per 24 hour   Intake 2460.16 ml   Output 1922 ml   Net 538.16 ml       Physical Exam:  General:  alert, cooperative, no distress, appears stated age  Neck:  no JVD  Lungs:  clear to auscultation bilaterally  Heart:  irregularly irregular rhythm  Abdomen:  abdomen is soft without significant tenderness, masses, organomegaly or guarding  Extremities:  Trace RLE edema, L AKA    Visit Vitals  /74   Pulse (!) 138   Temp 98.1 °F (36.7 °C)   Resp 27   Ht 5' 10\" (1.778 m)   Wt 177 lb 9.6 oz (80.6 kg)   SpO2 93%   BMI 25.48 kg/m²       Data Review:     Labs: Results:       Chemistry Recent Labs     03/02/22  0317 03/01/22  1740 03/01/22  0331 02/28/22  1030 02/28/22  1030   GLU 96  --  104*  --  96     --  141  --  140   K 3.3* 3.9 3.1*   < > 3.4*     --  113*  --  110   CO2 25  --  24  --  23   BUN 7  --  8  --  14   CREA 0.72  --  0.71  --  0.89   CA 8.1*  --  7.9*  --  9.2   MG  --  2.1 1.7  --   --    AGAP 3  --  4  --  7   BUCR 10*  --  11*  --  16    < > = values in this interval not displayed. CBC w/Diff Recent Labs     03/02/22 0317 03/01/22 0331 02/28/22  1030   WBC 9.4 9.0 11.7   RBC 2.92* 2.93* 3.49*   HGB 8.6* 8.3* 10.2*   HCT 26.7* 26.1* 30.8*    359 451*   GRANS 66 69  --    LYMPH 21 19*  --    EOS 1 0  --       Cardiac Enzymes No results found for: CPK, CK, CKMMB, CKMB, RCK3, CKMBT, CKNDX, CKND1, LEO, TROPT, TROIQ, DEWAYNE, TROPT, TNIPOC, BNP, BNPP   Coagulation Recent Labs     03/02/22 0317 03/01/22 2007   APTT 59.4* 36.1       Lipid Panel Lab Results   Component Value Date/Time    Cholesterol, total 83 12/15/2021 07:08 AM    HDL Cholesterol 22 (L) 12/15/2021 07:08 AM    LDL, calculated 43.2 12/15/2021 07:08 AM    VLDL, calculated 17.8 12/15/2021 07:08 AM    Triglyceride 89 12/15/2021 07:08 AM    CHOL/HDL Ratio 3.8 12/15/2021 07:08 AM      BNP No results found for: BNP, BNPP, XBNPT   Liver Enzymes No results for input(s): TP, ALB, TBIL, AP in the last 72 hours.     No lab exists for component: SGOT, GPT, DBIL   Thyroid Studies Lab Results   Component Value Date/Time    T4, Total 9.0 11/09/2017 10:28 AM    TSH 2.08 04/08/2021 07:05 AM          Signed By: JUNE Alan     March 2, 2022

## 2022-03-02 NOTE — CONSULTS
Consult    Patient: Emmett Freeman MRN: [id number]  SSN: [id number]    YOB: 1960  Age: 59 y.o. Sex: male      Subjective: Emmett Freeman is a 59 y.o. male who is being seen for asked to consult and treat gangrene and possible infection right lateral toes. .    Past Medical History:   Diagnosis Date    Acute blood loss as cause of postoperative anemia 4/4/2017    Anticoagulated on Coumadin     Aortoiliac occlusive disease (HCC) 1/25/2017    Atherosclerosis of native artery of both lower extremities with intermittent claudication ([address] 1/25/2017    Benign hypertensive heart disease with systolic congestive heart failure, NYHA class 2 ([address] 1/26/2015    Carotid artery disease (HCC)     Chronic anemia     Chronic systolic heart failure (HCC)     Chronic ulcer of right foot ([address] 4/4/2017    Chronic ulcer of right heel ([address] 8/8/2017    Coronary artery disease involving native coronary artery of native heart 3/15/2017    Successful stenting of Cx (Xience LESLEY) and RCA (Xience LESLEY) to 0% by Dr. Yas Stallings on 3/15/17.     DDD (degenerative disc disease), lumbar 1/26/2015    Dyslipidemia     Erectile dysfunction 7/5/2016    Euthyroid sick syndrome 4/6/2017    Hereditary peripheral neuropathy 11/15/2016    History of cardioversion 04/18/2017    S/P Synchronized external cardioversion (4/18/2017 - Dr. Shmuel Payne)    History of vitamin D deficiency 4/22/2017    Vitamin D 25-Hydroxy (4/22/2017) = 12.0; Vitamin D 25-Hydroxy (5/26/2017) = 38.7    Infection of vascular bypass graft ([address] 7/24/2017    Left lower extremity    Ischemic cardiomyopathy     Moderate to severe pulmonary hypertension ([address] 07/23/2017    Paroxysmal atrial fibrillation (HCC)     Peripheral artery disease ([address] 1/25/2017    Skin ulcer of malleolar area of right ankle ([address]     Spinal stenosis of lumbar region with radiculopathy 5/4/2015    Dr. Marin Recinos     Past Surgical History:   Procedure Laterality Date    CARDIAC CATHETERIZATION  3/8/2017         CARDIAC CATHETERIZATION  3/15/2017         CORONARY STENT SINGLE W/PTCA  3/15/2017         HX ABOVE KNEE AMPUTATION Left 08/18/2017    S/P Left above-the-knee amputation (8/18/2017 - Dr. Yarely Patel)    HX ATHERECTOMY Left 06/15/2017    S/P Atherectomy and balloon angioplasty of the left superficial femoral artery and above-knee popliteal artery (6/15/2017 - Dr. Yarely Patel)    HX ATHERECTOMY Right 09/07/2017    S/P Atherectomy and balloon angioplasty of the below-the-knee popliteal artery, above-the-knee popliteal artery, tibioperoneal trunk artery and posterior tibial artery (9/7/2017 - Dr. Yarely Patel)    HX COLONOSCOPY  07/23/2015    polyps repeat 2020 5 years Raya Henderson 94 Right 04/04/2017    S/P Right axillary to bifemoral artery bypass using an 8 mm Propaten graft from the right axilla to the right common femoral artery with a jump femoral-femoral bypass with another 8 mm Propaten external ring bypass; Right common femoral artery, and profunda femoris artery and superficial femoral artery endarterectomy causing an extensive surgery on the right side (4/4/2017 - Dr. Darwin Herbert)    [place] Right 04/07/2017    S/P Cutdown of femoral-femoral bypass, more on the left groin side; Right lower extremity angiography with first-order catheterization (4/7/2017 - Dr. Yarely Patel)    HX FEMORAL BYPASS Right 04/10/2017    S/P Right femoral to above-knee popliteal bypass with an 8 mm PTFE graft (4/10/2017 - Dr. Enrico Hunter)    HX OTHER SURGICAL Left 07/25/2017    S/P Removal of infected graft; Repair of left superficial femoral artery; Repair of left common femoral artery (7/25/2017 - Dr. Yarely Patel)    HX OTHER SURGICAL Right 06/27/2017    S/P Drainage of right side abscess (6/27/2017 - Dr. Yarely Patel)    HX OTHER SURGICAL Left 07/01/2017    S/P Left groin exploration; Left femoral repair pseudoaneurysm; Left wound washout; Wound VAC placement (7/01/2017 - Dr. Cassi Griffin)    HX OTHER SURGICAL Left 07/04/2017    S/P Left common femoral artery ligation; Jump bypass from right to left fem-fem to a more distal superficial femoral artery using 8 mm external ringed Propaten graft; Left groin wound exploration and washout (7/4/2017 - Dr. Cassi Griffin)    HX OTHER SURGICAL Right 08/18/2017    S/P Right axillary femoral jump bypass interposition; Removal of infected right axillary femoral bypass; Wound VAC placement of upper thigh 1.2 cm x 5.2 cm x 1.8 cm and groin 4 cm x 0.5 cm x 0.2 cm (8/18/2017 - Dr. Cassi Griffin)      Family History   Problem Relation Age of Onset    Heart Disease Father      Social History     Tobacco Use    Smoking status: Former Smoker    Smokeless tobacco: Never Used    Tobacco comment: quit in 2011   Substance Use Topics    Alcohol use:  Yes     Alcohol/week: 2.0 standard drinks     Types: 2 Cans of beer per week     Comment: 10 cans of beer a month      Current Facility-Administered Medications   Medication Dose Route Frequency Provider Last Rate Last Admin    dilTIAZem IR (CARDIZEM) tablet 60 mg  60 mg Oral Q6H Fercho Nye MD        ELECTROLYTE REPLACEMENT PROTOCOL - Potassium Standard Dosing   1 Each Other PRN Madeline Cedeño MD        ELECTROLYTE REPLACEMENT PROTOCOL - Magnesium   1 Each Other PRN Madeline Cedeño MD        sodium chloride (NS) flush 10 mL  10 mL IntraVENous Madeline Jaime MD   10 mL at 03/02/22 0604    dilTIAZem (CARDIZEM) 100 mg in 0.9% sodium chloride (MBP/ADV) 100 mL infusion  5 mg/hr IntraVENous TITRATE Millie Joyce PA 5 mL/hr at 03/02/22 1126 5 mg/hr at 03/02/22 1126    oxyCODONE-acetaminophen (PERCOCET) 5-325 mg per tablet 2 Tablet  2 Tablet Oral Q4H PRN Anthony Woodard MD   2 Tablet at 03/02/22 0604    heparin 25,000 units in  ml infusion  18-36 Units/kg/hr IntraVENous TITRATE Keenan Carter MD 16.1 mL/hr at 03/02/22 0717 20 Units/kg/hr at 03/02/22 0717    aspirin chewable tablet 81 mg  81 mg Oral DAILY Madeline Cedeño MD   81 mg at 03/02/22 0818    atorvastatin (LIPITOR) tablet 80 mg  80 mg Oral QHS Madeline Cedeño MD   80 mg at 03/01/22 2105    [Held by provider] dilTIAZem ER (CARDIZEM CD) capsule 180 mg  180 mg Oral DAILY Madeline Cedeño MD   180 mg at 03/01/22 1747    [Held by provider] furosemide (LASIX) tablet 20 mg  20 mg Oral DAILY Madeline Cedeño MD   20 mg at 03/01/22 2323    gabapentin (NEURONTIN) capsule 400 mg  400 mg Oral TID Keenan Carter MD   400 mg at 03/02/22 0818    [Held by provider] losartan (COZAAR) tablet 100 mg  100 mg Oral DAILY Madeline Cedeño MD   100 mg at 03/01/22 5952    metoprolol tartrate (LOPRESSOR) tablet 50 mg  50 mg Oral BID Keenan Carter MD   50 mg at 03/02/22 0818    oxyCODONE-acetaminophen (PERCOCET) 5-325 mg per tablet 1 Tablet  1 Tablet Oral Q4H PRN Keenan Carter MD   1 Tablet at 03/01/22 1732    HYDROmorphone (DILAUDID) syringe 0.5-1 mg  0.5-1 mg IntraVENous Q2H PRN Madeline Cedeño MD   1 mg at 03/01/22 0245    naloxone (NARCAN) injection 0.4 mg  0.4 mg IntraVENous PRN Madeline Cedeño MD        metoprolol (LOPRESSOR) injection 5 mg  5 mg IntraVENous Q6H PRN Keenan Carter MD   5 mg at 03/01/22 1804        Allergies   Allergen Reactions    Morphine Other (comments)     Patient gets confused with morphine. Review of Systems:  A comprehensive review of systems was negative except for that written in the History of Present Illness. Objective:     Vitals:    03/02/22 1100 03/02/22 1124 03/02/22 1200 03/02/22 1300   BP: 113/76 113/76 119/70 135/75   Pulse: 80 84 93 95   Resp: 19  19 19   Temp:   98.1 °F (36.7 °C)    SpO2: 96%  97% 95%   Weight:       Height:            Physical Exam:  Seen at bedside in cardiac unit, awake and alert. Inspected right foot wounds.   Fissure ulcer on heel  With superficial necrosis. No signs infection. Foot mostly warm to toes, moderately cooler forefoot. Right fourth and fifth toes are necrotic . No foot x-ray's  Assessment:     Hospital Problems  Date Reviewed: 3/2/2022          Codes Class Noted POA    Occlusion of right femoral-popliteal bypass graft [facility]) ICD-10-CM: G55.437C  ICD-9-CM: 996.74  2/28/2022 Unknown              Plan:     Gangrene fourth and fifth toes right foot status post vascular procedure. No acute signs infection. Plan x-ray and for surgery by Friday, with fourth and fifth toe amputations. Explained risk of transmetatarsal amputation at later date and risk higher amputation level. Has left lower leg amputation previously.     Signed By: Maverick Jamison DPM     March 2, 2022

## 2022-03-02 NOTE — PROGRESS NOTES
Problem: Pressure Injury - Risk of  Goal: *Prevention of pressure injury  Description: Document Efrem Scale and appropriate interventions in the flowsheet. Outcome: Progressing Towards Goal  Note: Pressure Injury Interventions:  Sensory Interventions: Avoid rigorous massage over bony prominences,Check visual cues for pain,Discuss PT/OT consult with provider,Float heels,Keep linens dry and wrinkle-free,Maintain/enhance activity level,Minimize linen layers,Monitor skin under medical devices,Pad between skin to skin,Pressure redistribution bed/mattress (bed type)         Activity Interventions: Chair cushion,PT/OT evaluation,Increase time out of bed    Mobility Interventions: Chair cushion,Float heels,HOB 30 degrees or less,Pressure redistribution bed/mattress (bed type)    Nutrition Interventions: Document food/fluid/supplement intake,Discuss nutritional consult with provider,Offer support with meals,snacks and hydration                     Problem: Patient Education: Go to Patient Education Activity  Goal: Patient/Family Education  Outcome: Progressing Towards Goal     Problem: Falls - Risk of  Goal: *Absence of Falls  Description: Document [place] Fall Risk and appropriate interventions in the flowsheet.   Outcome: Progressing Towards Goal  Note: Fall Risk Interventions:  Mobility Interventions: Bed/chair exit alarm,Patient to call before getting OOB,Strengthening exercises (ROM-active/passive),Utilize walker, cane, or other assistive device         Medication Interventions: Assess postural VS orthostatic hypotension,Evaluate medications/consider consulting pharmacy,Patient to call before getting OOB,Bed/chair exit alarm,Teach patient to arise slowly    Elimination Interventions: Bed/chair exit alarm,Call light in reach,Patient to call for help with toileting needs,Urinal in reach    History of Falls Interventions: Bed/chair exit alarm,Door open when patient unattended,Evaluate medications/consider consulting pharmacy,Investigate reason for fall,Room close to nurse's station,Assess for delayed presentation/identification of injury for 48 hrs (comment for end date)         Problem: Patient Education: Go to Patient Education Activity  Goal: Patient/Family Education  Outcome: Progressing Towards Goal

## 2022-03-02 NOTE — PROGRESS NOTES
Right foot now well perfused, ischemic pain is gone  Heel and lateral foot ulcer seem to be healing nicely  Gangrene of the right fourth toe and partial fifth, are dry and demarcating  Excellent dorsalis pedis pulse, posterior tibial pulse now dopplerable  Already anticoagulation  Discussed with cardiology regarding rate, pressure, and potassium  We will discuss with Dr. Luis Russell  We will start Eliquis once any procedural planning is done  groin and calf incisions are clean dry and intact

## 2022-03-03 ENCOUNTER — APPOINTMENT (OUTPATIENT)
Dept: NON INVASIVE DIAGNOSTICS | Age: 62
DRG: 240 | End: 2022-03-03
Attending: PHYSICIAN ASSISTANT
Payer: COMMERCIAL

## 2022-03-03 ENCOUNTER — ANESTHESIA EVENT (OUTPATIENT)
Dept: SURGERY | Age: 62
DRG: 240 | End: 2022-03-03
Payer: COMMERCIAL

## 2022-03-03 LAB
APTT PPP: 119.3 SEC (ref 23–36.4)
APTT PPP: 68.3 SEC (ref 23–36.4)
ECHO AO ROOT DIAM: 3.6 CM
ECHO AO ROOT INDEX: 1.82 CM/M2
ECHO LA VOL 2C: 102 ML (ref 18–58)
ECHO LA VOL 4C: 59 ML (ref 18–58)
ECHO LA VOLUME AREA LENGTH: 89 ML
ECHO LA VOLUME INDEX A2C: 52 ML/M2 (ref 16–34)
ECHO LA VOLUME INDEX A4C: 30 ML/M2 (ref 16–34)
ECHO LA VOLUME INDEX AREA LENGTH: 45 ML/M2 (ref 16–34)
ECHO LV FRACTIONAL SHORTENING: 9 % (ref 28–44)
ECHO LV INTERNAL DIMENSION DIASTOLE INDEX: 2.68 CM/M2
ECHO LV INTERNAL DIMENSION DIASTOLIC: 5.3 CM (ref 4.2–5.9)
ECHO LV INTERNAL DIMENSION SYSTOLIC INDEX: 2.42 CM/M2
ECHO LV INTERNAL DIMENSION SYSTOLIC: 4.8 CM
ECHO LV IVSD: 0.8 CM (ref 0.6–1)
ECHO LV MASS 2D: 174.5 G (ref 88–224)
ECHO LV MASS INDEX 2D: 88.1 G/M2 (ref 49–115)
ECHO LV POSTERIOR WALL DIASTOLIC: 1 CM (ref 0.6–1)
ECHO LV RELATIVE WALL THICKNESS RATIO: 0.38
ECHO LVOT AREA: 3.1 CM2
ECHO LVOT DIAM: 2 CM
ECHO LVOT MEAN GRADIENT: 1 MMHG
ECHO LVOT PEAK GRADIENT: 2 MMHG
ECHO LVOT PEAK VELOCITY: 0.8 M/S
ECHO LVOT STROKE VOLUME INDEX: 16.8 ML/M2
ECHO LVOT SV: 33.3 ML
ECHO LVOT VTI: 10.6 CM
ECHO PULMONARY ARTERY SYSTOLIC PRESSURE (PASP): 23 MMHG
ECHO RV TAPSE: 1.2 CM (ref 1.5–2)
ECHO TV REGURGITANT MAX VELOCITY: 1.95 M/S
ECHO TV REGURGITANT PEAK GRADIENT: 15 MMHG

## 2022-03-03 PROCEDURE — 93306 TTE W/DOPPLER COMPLETE: CPT

## 2022-03-03 PROCEDURE — 85730 THROMBOPLASTIN TIME PARTIAL: CPT

## 2022-03-03 PROCEDURE — 36415 COLL VENOUS BLD VENIPUNCTURE: CPT

## 2022-03-03 PROCEDURE — 77030037878 HC DRSG MEPILEX >48IN BORD MOLN -B

## 2022-03-03 PROCEDURE — 74011250637 HC RX REV CODE- 250/637: Performed by: PHYSICIAN ASSISTANT

## 2022-03-03 PROCEDURE — 74011000250 HC RX REV CODE- 250: Performed by: SURGERY

## 2022-03-03 PROCEDURE — 97535 SELF CARE MNGMENT TRAINING: CPT

## 2022-03-03 PROCEDURE — 74011250637 HC RX REV CODE- 250/637: Performed by: SURGERY

## 2022-03-03 PROCEDURE — 97165 OT EVAL LOW COMPLEX 30 MIN: CPT

## 2022-03-03 PROCEDURE — 97162 PT EVAL MOD COMPLEX 30 MIN: CPT

## 2022-03-03 PROCEDURE — 65620000000 HC RM CCU GENERAL

## 2022-03-03 PROCEDURE — 2709999900 HC NON-CHARGEABLE SUPPLY

## 2022-03-03 PROCEDURE — 77030041076 HC DRSG AG OPTICELL MDII -A

## 2022-03-03 PROCEDURE — 74011250636 HC RX REV CODE- 250/636: Performed by: PODIATRIST

## 2022-03-03 PROCEDURE — 74011250637 HC RX REV CODE- 250/637: Performed by: INTERNAL MEDICINE

## 2022-03-03 PROCEDURE — 97116 GAIT TRAINING THERAPY: CPT

## 2022-03-03 PROCEDURE — 74011250636 HC RX REV CODE- 250/636: Performed by: SURGERY

## 2022-03-03 PROCEDURE — 99232 SBSQ HOSP IP/OBS MODERATE 35: CPT | Performed by: INTERNAL MEDICINE

## 2022-03-03 RX ORDER — CETIRIZINE HCL 10 MG
10 TABLET ORAL DAILY
Status: DISCONTINUED | OUTPATIENT
Start: 2022-03-03 | End: 2022-03-16 | Stop reason: HOSPADM

## 2022-03-03 RX ORDER — SODIUM CHLORIDE, SODIUM LACTATE, POTASSIUM CHLORIDE, CALCIUM CHLORIDE 600; 310; 30; 20 MG/100ML; MG/100ML; MG/100ML; MG/100ML
75 INJECTION, SOLUTION INTRAVENOUS CONTINUOUS
Status: CANCELLED | OUTPATIENT
Start: 2022-03-04 | End: 2022-03-04

## 2022-03-03 RX ORDER — LIDOCAINE HYDROCHLORIDE 10 MG/ML
0.1 INJECTION, SOLUTION EPIDURAL; INFILTRATION; INTRACAUDAL; PERINEURAL AS NEEDED
Status: CANCELLED | OUTPATIENT
Start: 2022-03-03

## 2022-03-03 RX ORDER — DOCUSATE SODIUM 100 MG/1
100 CAPSULE, LIQUID FILLED ORAL 2 TIMES DAILY
Status: DISCONTINUED | OUTPATIENT
Start: 2022-03-03 | End: 2022-03-16 | Stop reason: HOSPADM

## 2022-03-03 RX ORDER — POLYETHYLENE GLYCOL 3350 17 G/17G
17 POWDER, FOR SOLUTION ORAL 2 TIMES DAILY
Status: DISCONTINUED | OUTPATIENT
Start: 2022-03-03 | End: 2022-03-16 | Stop reason: HOSPADM

## 2022-03-03 RX ORDER — DILTIAZEM HYDROCHLORIDE 180 MG/1
180 CAPSULE, COATED, EXTENDED RELEASE ORAL EVERY MORNING
Status: DISCONTINUED | OUTPATIENT
Start: 2022-03-03 | End: 2022-03-06

## 2022-03-03 RX ORDER — FAMOTIDINE 20 MG/1
20 TABLET, FILM COATED ORAL ONCE
Status: CANCELLED | OUTPATIENT
Start: 2022-03-04 | End: 2022-03-04

## 2022-03-03 RX ORDER — DILTIAZEM HYDROCHLORIDE 120 MG/1
120 CAPSULE, COATED, EXTENDED RELEASE ORAL EVERY EVENING
Status: DISCONTINUED | OUTPATIENT
Start: 2022-03-03 | End: 2022-03-06

## 2022-03-03 RX ORDER — DIPHENHYDRAMINE HCL 25 MG
25 CAPSULE ORAL
Status: DISCONTINUED | OUTPATIENT
Start: 2022-03-03 | End: 2022-03-16 | Stop reason: HOSPADM

## 2022-03-03 RX ADMIN — ATORVASTATIN CALCIUM 80 MG: 40 TABLET, FILM COATED ORAL at 21:48

## 2022-03-03 RX ADMIN — DOCUSATE SODIUM 100 MG: 100 CAPSULE, LIQUID FILLED ORAL at 09:24

## 2022-03-03 RX ADMIN — OXYCODONE AND ACETAMINOPHEN 2 TABLET: 5; 325 TABLET ORAL at 04:18

## 2022-03-03 RX ADMIN — DILTIAZEM HYDROCHLORIDE 180 MG: 180 CAPSULE, COATED, EXTENDED RELEASE ORAL at 10:17

## 2022-03-03 RX ADMIN — DIPHENHYDRAMINE HCL 25 MG: 25 CAPSULE ORAL at 14:39

## 2022-03-03 RX ADMIN — ASPIRIN 81 MG 81 MG: 81 TABLET ORAL at 08:33

## 2022-03-03 RX ADMIN — SODIUM CHLORIDE, PRESERVATIVE FREE 10 ML: 5 INJECTION INTRAVENOUS at 14:39

## 2022-03-03 RX ADMIN — OXYCODONE AND ACETAMINOPHEN 2 TABLET: 5; 325 TABLET ORAL at 13:25

## 2022-03-03 RX ADMIN — GABAPENTIN 400 MG: 400 CAPSULE ORAL at 08:33

## 2022-03-03 RX ADMIN — SODIUM CHLORIDE, PRESERVATIVE FREE 10 ML: 5 INJECTION INTRAVENOUS at 21:48

## 2022-03-03 RX ADMIN — SODIUM CHLORIDE, PRESERVATIVE FREE 10 ML: 5 INJECTION INTRAVENOUS at 06:00

## 2022-03-03 RX ADMIN — METOPROLOL TARTRATE 50 MG: 50 TABLET, FILM COATED ORAL at 08:33

## 2022-03-03 RX ADMIN — CETIRIZINE HYDROCHLORIDE 10 MG: 10 TABLET, FILM COATED ORAL at 09:25

## 2022-03-03 RX ADMIN — GABAPENTIN 400 MG: 400 CAPSULE ORAL at 21:48

## 2022-03-03 RX ADMIN — OXYCODONE AND ACETAMINOPHEN 2 TABLET: 5; 325 TABLET ORAL at 21:53

## 2022-03-03 RX ADMIN — HEPARIN SODIUM 18 UNITS/KG/HR: 1000 INJECTION INTRAVENOUS; SUBCUTANEOUS at 07:06

## 2022-03-03 RX ADMIN — HEPARIN SODIUM 20 UNITS/KG/HR: 1000 INJECTION INTRAVENOUS; SUBCUTANEOUS at 16:49

## 2022-03-03 RX ADMIN — DILTIAZEM HYDROCHLORIDE 60 MG: 60 TABLET, FILM COATED ORAL at 05:52

## 2022-03-03 RX ADMIN — POLYETHYLENE GLYCOL 3350 17 G: 17 POWDER, FOR SOLUTION ORAL at 08:33

## 2022-03-03 RX ADMIN — DILTIAZEM HYDROCHLORIDE 120 MG: 120 CAPSULE, COATED, EXTENDED RELEASE ORAL at 17:06

## 2022-03-03 RX ADMIN — GABAPENTIN 400 MG: 400 CAPSULE ORAL at 16:29

## 2022-03-03 NOTE — PROGRESS NOTES
OCCUPATIONAL THERAPY EVALUATION/DISCHARGE    Patient: Can Rodriguez (49 y.o. male)  Date: 3/3/2022  Primary Diagnosis: Occlusion of right femoral-popliteal bypass graft ([facility] [T82.898A]  Procedure(s) (LRB):  RIGHT LEG ILIAC DEEP FEMORAL TO ANTERIOR TIBIAL BYPASS (Right) 3 Days Post-Op   Precautions: standard, LLE AKA     PLOF: Pt reports being Mod Ind for ADLs and functional mobility. Pt uses prosthetic at work and in the community, and w/c at home for functional mobility. ASSESSMENT AND RECOMMENDATIONS:  Based on the objective data described below, the patient presents with ability to perform basic ADLs and functional transfers at baseline level of function. Pt performed/simulated UB/LB ADLs with Mod Ind/Supervision for seated and std aspects. Pt demonstrates good safety awareness during all standing tasks in spite of not having his prosthesis in room. Pt is with SOB following functional mobility. Pt educated on mult energy conservation techniques to utilize in home environment and while at the hospital, including pacing and deep breathing to prevent SOB and fatigue, and increase activity tolerance and safety w/ADLs and functional mobility, pt verbalized understanding. Skilled occupational therapy is not indicated at this time. Pt is scheduled to have surgery tomorrow for R foot. Please re-order OT with updated WB and activity restrictions to ensure pt's safety with ADLs following surgery. Discharge Recommendations: Home Health for safety check  Further Equipment Recommendations for Discharge: N/A      SUBJECTIVE:   Patient stated I am very active.     OBJECTIVE DATA SUMMARY:     Past Medical History:   Diagnosis Date    Acute blood loss as cause of postoperative anemia 4/4/2017    Anticoagulated on Coumadin     Aortoiliac occlusive disease ([facility] 1/25/2017    Atherosclerosis of native artery of both lower extremities with intermittent claudication ([facility] 1/25/2017    Benign hypertensive heart disease with systolic congestive heart failure, NYHA class 2 ([address] 1/26/2015    Carotid artery disease (HCC)     Chronic anemia     Chronic systolic heart failure (HCC)     Chronic ulcer of right foot ([address] 4/4/2017    Chronic ulcer of right heel ([address] 8/8/2017    Coronary artery disease involving native coronary artery of native heart 3/15/2017    Successful stenting of Cx (Xience LESLEY) and RCA (Xience LESLEY) to 0% by Dr. Lorie Barr on 3/15/17.     DDD (degenerative disc disease), lumbar 1/26/2015    Dyslipidemia     Erectile dysfunction 7/5/2016    Euthyroid sick syndrome 4/6/2017    Hereditary peripheral neuropathy 11/15/2016    History of cardioversion 04/18/2017    S/P Synchronized external cardioversion (4/18/2017 - Dr. Isabel Gonsales)    History of vitamin D deficiency 4/22/2017    Vitamin D 25-Hydroxy (4/22/2017) = 12.0; Vitamin D 25-Hydroxy (5/26/2017) = 38.7    Infection of vascular bypass graft ([address] 7/24/2017    Left lower extremity    Ischemic cardiomyopathy     Moderate to severe pulmonary hypertension ([address] 07/23/2017    Paroxysmal atrial fibrillation ([address]     Peripheral artery disease ([address] 1/25/2017    Skin ulcer of malleolar area of right ankle ([address]     Spinal stenosis of lumbar region with radiculopathy 5/4/2015    Dr. Diamante Zabala     Past Surgical History:   Procedure Laterality Date    CARDIAC CATHETERIZATION  3/8/2017         CARDIAC CATHETERIZATION  3/15/2017         CORONARY STENT SINGLE W/PTCA  3/15/2017         HX ABOVE KNEE AMPUTATION Left 08/18/2017    S/P Left above-the-knee amputation (8/18/2017 - Dr. James Cohen)    HX ATHERECTOMY Left 06/15/2017    S/P Atherectomy and balloon angioplasty of the left superficial femoral artery and above-knee popliteal artery (6/15/2017 - Dr. James Cohen)    HX ATHERECTOMY Right 09/07/2017    S/P Atherectomy and balloon angioplasty of the below-the-knee popliteal artery, above-the-knee popliteal artery, tibioperoneal trunk artery and posterior tibial artery (9/7/2017 - Dr. Angélica Junior)    HX COLONOSCOPY  07/23/2015    polyps repeat 2020 5 years Trudi Henderson Right 04/04/2017    S/P Right axillary to bifemoral artery bypass using an 8 mm Propaten graft from the right axilla to the right common femoral artery with a jump femoral-femoral bypass with another 8 mm Propaten external ring bypass; Right common femoral artery, and profunda femoris artery and superficial femoral artery endarterectomy causing an extensive surgery on the right side (4/4/2017 - Dr. Eder Brito)    [place]  Right 04/07/2017    S/P Cutdown of femoral-femoral bypass, more on the left groin side; Right lower extremity angiography with first-order catheterization (4/7/2017 - Dr. Angélica Junior)    [address] Right 04/10/2017    S/P Right femoral to above-knee popliteal bypass with an 8 mm PTFE graft (4/10/2017 - Dr. Jose Benavides)    HX OTHER SURGICAL Left 07/25/2017    S/P Removal of infected graft; Repair of left superficial femoral artery; Repair of left common femoral artery (7/25/2017 - Dr. Angélica Junior)    HX OTHER SURGICAL Right 06/27/2017    S/P Drainage of right side abscess (6/27/2017 - Dr. Angélica Junior)    HX OTHER SURGICAL Left 07/01/2017    S/P Left groin exploration; Left femoral repair pseudoaneurysm; Left wound washout; Wound VAC placement (7/01/2017 - Dr. Angélica Junior)    HX OTHER SURGICAL Left 07/04/2017    S/P Left common femoral artery ligation; Jump bypass from right to left fem-fem to a more distal superficial femoral artery using 8 mm external ringed Propaten graft; Left groin wound exploration and washout (7/4/2017 - Dr. Angélica Junior)    HX OTHER SURGICAL Right 08/18/2017    S/P Right axillary femoral jump bypass interposition; Removal of infected right axillary femoral bypass;  Wound VAC placement of upper thigh 1.2 cm x 5.2 cm x 1.8 cm and groin 4 cm x 0.5 cm x 0.2 cm (8/18/2017 - Dr. Nayeli Fam)     Barriers to Learning/Limitations: None  Compensate with: visual, verbal, tactile, kinesthetic cues/model    Home Situation:   Home Situation  Home Environment: Private residence  One/Two Story Residence: One story  Living Alone: Yes  Support Systems: Child(mile)  Patient Expects to be Discharged to[de-identified] Unable to determine at this time  Current DME Used/Available at Home: Wheelchair,Walker, rolling (L prosthetic leg )  [x]     Right hand dominant   []     Left hand dominant    Cognitive/Behavioral Status:  Neurologic State: Alert  Orientation Level: Oriented X4  Cognition: Follows commands  Safety/Judgement: Fall prevention    Skin: bruises, dry skin  Edema: L hand    Vision/Perceptual:       Acuity: Able to read clock/calendar on wall without difficulty     Coordination: BUE  Coordination: Generally decreased, functional  Fine Motor Skills-Upper: Left Intact; Right Intact    Gross Motor Skills-Upper: Left Intact; Right Intact    Balance:  Sitting: Intact  Standing: Impaired; With support  Standing - Static: Good  Standing - Dynamic : Fair    Strength: BUE  Strength: Generally decreased, functional   Tone & Sensation: BUE  Tone: Normal  Sensation: Intact   Range of Motion: BUE  AROM: Within functional limits   Functional Mobility and Transfers for ADLs:  Bed Mobility:  Rolling:  (NT this date)   Transfers:  Sit to Stand: Supervision  Stand to Sit: Supervision   Toilet Transfer : Supervision    Bathroom Mobility: Supervision/set up    ADL Assessment:  Feeding: Setup  Oral Facial Hygiene/Grooming: Setup  Bathing: Supervision  Upper Body Dressing: Modified independent  Lower Body Dressing: Supervision  Toileting: Supervision   ADL Intervention:     Cognitive Retraining  Safety/Judgement: Fall prevention  Pain:  Pain level pre-treatment: not rated  Pain level post-treatment: not rated    Activity Tolerance:   Fair  Please refer to the flowsheet for vital signs taken during this treatment. After treatment:   [x]  Patient left in no apparent distress sitting up in chair  []  Patient left in no apparent distress in bed  [x]  Call bell left within reach  [x]  Nursing notified  []  Caregiver present  []  Bed alarm activated    COMMUNICATION/EDUCATION:   [x]      Role of Occupational Therapy in the acute care setting  [x]      Home safety education was provided and the patient/caregiver indicated understanding. [x]      Patient/family have participated as able and agree with findings and recommendations. []      Patient is unable to participate in plan of care at this time. Thank you for this referral.  Lawson Patton OTR/L  Time Calculation: 18 mins      Eval Complexity: History: LOW Complexity : Brief history review ; Examination: LOW Complexity : 1-3 performance deficits relating to physical, cognitive , or psychosocial skils that result in activity limitations and / or participation restrictions ;    Decision Making:LOW Complexity : No comorbidities that affect functional and no verbal or physical assistance needed to complete eval tasks

## 2022-03-03 NOTE — PROGRESS NOTES
0700- Bedside shift change report given by Pramod Arredondo RN. Report included the following information SBAR, ED Summary, OR Summary, Procedure Summary, Intake/Output, MAR, Recent Results, Cardiac Rhythm ,, Alarm Parameters , Procedure Verification and Quality Measures. 1005- PT at bedside. 1030- MD Cedeño at  Bedside. 1145-Echo at Bedside. 2000- Bedside shift change report given to Manny Gates, [address] [place] [address]. Report included the following information SBAR, ED Summary, OR Summary, Procedure Summary, Intake/Output, MAR, Accordion, Recent Results and Cardiac Rhythm , Alarm Parameters, Procedure Verification and Quality Measures. [facility]

## 2022-03-03 NOTE — PROGRESS NOTES
Problem: Pressure Injury - Risk of  Goal: *Prevention of pressure injury  Description: Document Efrem Scale and appropriate interventions in the flowsheet. Outcome: Progressing Towards Goal  Note: Pressure Injury Interventions:  Sensory Interventions: Assess changes in LOC,Chair cushion,Float heels,Keep linens dry and wrinkle-free,Minimize linen layers. Pt has pre admission right malleolar vascular ulcer, utilizing mepilex foam sacral and heel protection. Activity Interventions: Assess need for specialty bed,Chair cushion,Pressure redistribution bed/mattress(bed type)    Mobility Interventions: Assess need for specialty bed,HOB 30 degrees or less,Pressure redistribution bed/mattress (bed type)    Nutrition Interventions: Document food/fluid/supplement intake                     Problem: Patient Education: Go to Patient Education Activity  Goal: Patient/Family Education  Outcome: Progressing Towards Goal     Problem: Falls - Risk of  Goal: *Absence of Falls  Description: Document Star Massey Fall Risk and appropriate interventions in the flowsheet. Outcome: Progressing Towards Goal  Note: Fall Risk Interventions:  Mobility Interventions: Bed/chair exit alarm.  Pt mobilizing in room with walker, pt left his left prosthetic leg at home and will have family member bring it to him at the hospital.         Medication Interventions: Bed/chair exit alarm    Elimination Interventions: Bed/chair exit alarm,Call light in reach    History of Falls Interventions: Bed/chair exit alarm         Problem: Patient Education: Go to Patient Education Activity  Goal: Patient/Family Education  Outcome: Progressing Towards Goal     Problem: Patient Education: Go to Patient Education Activity  Goal: Patient/Family Education  Outcome: Progressing Towards Goal     Problem: Patient Education: Go to Patient Education Activity  Goal: Patient/Family Education  Outcome: Progressing Towards Goal     Problem: Patient Education: Go to Patient Education Activity  Goal: Patient/Family Education  Outcome: Progressing Towards Goal     Problem: LE Bypass: Day of Surgery/Post-Op (Initiate SCIP Measures for Post-Op Care)  Goal: Off Pathway (Use only if patient is Off Pathway)  Outcome: Progressing Towards Goal  Goal: Activity/Safety  Outcome: Progressing Towards Goal  Goal: Consults, if ordered  Outcome: Progressing Towards Goal  Note: Podiatry consult for evaluation of preadmission necrotic right foot fourth and fifth toes. Goal: Nutrition/Diet  Outcome: Progressing Towards Goal  Goal: Medications  Outcome: Progressing Towards Goal  Goal: Respiratory  Outcome: Progressing Towards Goal  Note: On room air, O2 saturation 95%  Goal: Treatments/Interventions/Procedures  Outcome: Progressing Towards Goal  Goal: Psychosocial  Outcome: Progressing Towards Goal  Goal: *Lungs clear or at baseline  Outcome: Progressing Towards Goal  Goal: *Hemodynamically stable  Outcome: Progressing Towards Goal  Note: Oral antihypertensive and Atrial fibrillation rate controlling medications restarted, IV medication infusions discontinued, VSS.   Goal: *Optimal pain control at patient's stated goal  Outcome: Progressing Towards Goal  Goal: *Tolerating diet  Outcome: Progressing Towards Goal  Goal: *Demonstrates progressive activity  Outcome: Progressing Towards Goal  Note: Pt able to utilize walker and ambulate in room, pt will have family member bring his left prosthetic leg to him in the hospital.     Problem: LE Bypass: Post-Op Day 1  Goal: Activity/Safety  Outcome: Progressing Towards Goal  Goal: Consults, if ordered  Outcome: Progressing Towards Goal  Goal: Diagnostic Test/Procedures  Outcome: Progressing Towards Goal  Goal: Nutrition/Diet  Outcome: Progressing Towards Goal  Goal: Discharge Planning  Outcome: Progressing Towards Goal  Goal: Medications  Outcome: Progressing Towards Goal  Goal: Respiratory  Outcome: Progressing Towards Goal  Note: On Room Air with O2 saturation 95%.  Goal: Treatments/Interventions/Procedures  Outcome: Progressing Towards Goal  Goal: Psychosocial  Outcome: Progressing Towards Goal  Goal: *Lungs clear or at baseline  Outcome: Progressing Towards Goal  Goal: *Hemodynamically stable  Outcome: Progressing Towards Goal  Goal: *Optimal pain control at patient's stated goal  Outcome: Progressing Towards Goal  Goal: *Tolerating diet  Outcome: Progressing Towards Goal  Goal: *Demonstrates progressive activity  Outcome: Progressing Towards Goal  Goal: *Adequate urinary output (equal to or greater than 30 milliliters/hour)  Outcome: Progressing Towards Goal     Problem: LE Bypass: Post-Op Day 2  Goal: Off Pathway (Use only if patient is Off Pathway)  Outcome: Progressing Towards Goal  Goal: Activity/Safety  Outcome: Progressing Towards Goal  Goal: Nutrition/Diet  Outcome: Progressing Towards Goal  Goal: Discharge Planning  Outcome: Progressing Towards Goal  Goal: Medications  Outcome: Progressing Towards Goal  Goal: Respiratory  Outcome: Progressing Towards Goal  Goal: Treatments/Interventions/Procedures  Outcome: Progressing Towards Goal  Goal: Psychosocial  Outcome: Progressing Towards Goal  Goal: *Lungs clear or at baseline  Outcome: Progressing Towards Goal  Goal: *Hemodynamically stable  Outcome: Progressing Towards Goal  Goal: *Optimal pain control at patient's stated goal  Outcome: Progressing Towards Goal  Goal: *Tolerating diet  Outcome: Progressing Towards Goal  Goal: *Demonstrates progressive activity  Outcome: Progressing Towards Goal     Problem: LE Bypass: Post-Op Day 3  Goal: Activity/Safety  Outcome: Progressing Towards Goal  Goal: Nutrition/Diet  Outcome: Progressing Towards Goal  Goal: Discharge Planning  Outcome: Progressing Towards Goal  Goal: Medications  Outcome: Progressing Towards Goal  Goal: Respiratory  Outcome: Progressing Towards Goal  Goal: Treatments/Interventions/Procedures  Outcome: Progressing Towards Goal  Goal: Psychosocial  Outcome: Progressing Towards Goal  Goal: *Lungs clear or at baseline  Outcome: Progressing Towards Goal  Goal: *Hemodynamically stable  Outcome: Progressing Towards Goal  Goal: *Optimal pain control at patient's stated goal  Outcome: Progressing Towards Goal  Goal: *Tolerating diet  Outcome: Progressing Towards Goal  Goal: *Demonstrates progressive activity  Outcome: Progressing Towards Goal     Problem: LE Bypass: Post-Op Day 4  Goal: Off Pathway (Use only if patient is Off Pathway)  Outcome: Progressing Towards Goal  Goal: Activity/Safety  Outcome: Progressing Towards Goal  Goal: Nutrition/Diet  Outcome: Progressing Towards Goal  Goal: Discharge Planning  Outcome: Progressing Towards Goal  Goal: Medications  Outcome: Progressing Towards Goal  Goal: Respiratory  Outcome: Progressing Towards Goal  Goal: Treatments/Interventions/Procedures  Outcome: Progressing Towards Goal  Goal: Psychosocial  Outcome: Progressing Towards Goal     Problem: LE Bypass: Discharge Outcomes  Goal: *Lungs clear or at baseline  Outcome: Progressing Towards Goal  Goal: *Hemodynamically stable  Outcome: Progressing Towards Goal  Goal: *Optimal pain control at patient's stated goal  Outcome: Progressing Towards Goal  Goal: *Tolerating diet  Outcome: Progressing Towards Goal  Goal: *Demonstrates progressive activity  Outcome: Progressing Towards Goal  Goal: *Verbalizes name, dosage, time, side effects, and number of days to continue medications  Outcome: Progressing Towards Goal     Problem: Pain  Goal: *Control of Pain  3/3/2022 1501 by Kan Sosa RN  Outcome: Progressing Towards Goal  Note: Patient on pain medication regime, notifying nurse if pain medication is required to keep patients pain less than a Numeric Scale of 5.  3/3/2022 1459 by Kan Sosa RN  Outcome: Progressing Towards Goal  Note: Patient utilizing oral pain medications as needed for numeric pain >/= 5.  Pt within comfort level, able to perform ADLs.  Goal: *PALLIATIVE CARE:  Alleviation of Pain  3/3/2022 1501 by Jack Seymour RN  Outcome: Progressing Towards Goal  3/3/2022 1501 by Jack Seymour RN  Outcome: Progressing Towards Goal  3/3/2022 1459 by Jack Seymour RN  Outcome: Progressing Towards Goal     Problem: Patient Education: Go to Patient Education Activity  Goal: Patient/Family Education  3/3/2022 1501 by Jack Seymour RN  Outcome: Progressing Towards Goal  3/3/2022 1501 by Jack Seymour RN  Outcome: Progressing Towards Goal

## 2022-03-03 NOTE — PROGRESS NOTES
0700-Bedside shift change report given by Tripp Browning RN. Report included the following information SBAR, ED Summary, OR Summary, Procedure Summary, Intake/Output, MAR, Recent Results, Med Rec Status, Cardiac Rhythm ,, Alarm Parameters , Procedure Verification and Quality Measures. 1900-Bedside shift change report given to Emely Mirza RN. Report included the following information SBAR, ED Summary, OR Summary, Procedure Summary, Intake/Output, MAR, Recent Results, Med Rec Status, Cardiac Rhythm ,, Alarm Parameters , Procedure Verification and Quality Measures.

## 2022-03-03 NOTE — PROGRESS NOTES
Problem: Mobility Impaired (Adult and Pediatric)  Goal: *Acute Goals and Plan of Care (Insert Text)  Outcome: Resolved/Met     PHYSICAL THERAPY EVALUATION AND DISCHARGE    Patient: Can Rodriguez (82 y.o. male)  Date: 3/3/2022  Primary Diagnosis: Occlusion of right femoral-popliteal bypass graft ([facility] [address] [T82.898A]  Procedure(s) (LRB):  RIGHT LEG ILIAC DEEP FEMORAL TO ANTERIOR TIBIAL BYPASS (Right) 3 Days Post-Op   Precautions:      WBAT  PLOF: pt lives alone in a 1 SH, mod ind PTA, has prosthetic for L AKA, still working, has a walker and wc at home for use as needed     ASSESSMENT :  Based on the objective data described below, the patient presents with baseline functional mobility level. Pt with plans for R toe amputations tomorrow and thus we can return to evaluate then. Pt is able to amb with RW and hop-to gait pattern with the RLE, supervision given for safety around hospital room for obstacle negotiation. Pt left up in chair with all needs met and call bell within reach, nursing notified and agreeable with plan. Please re-order after surgery with any weight baring/activity restrictions. Patient does not require further skilled intervention at this level of care. PLAN :  Recommendations and Planned Interventions:   No formal PT needs identified at this time.   Discharge Recommendations: Home Health vs rehab pending progress after surgery  Further Equipment Recommendations for Discharge: N/A     SUBJECTIVE:   Patient stated I always try my best with what I got going on.    OBJECTIVE DATA SUMMARY:     Past Medical History:   Diagnosis Date    Acute blood loss as cause of postoperative anemia 4/4/2017    Anticoagulated on Coumadin     Aortoiliac occlusive disease ([facility] [address] 1/25/2017    Atherosclerosis of native artery of both lower extremities with intermittent claudication ([facility] [address] 1/25/2017    Benign hypertensive heart disease with systolic congestive heart failure, NYHA class 2 ([facility] [address] 1/26/2015 Carotid artery disease (HCC)     Chronic anemia     Chronic systolic heart failure (HCC)     Chronic ulcer of right foot ([address] 4/4/2017    Chronic ulcer of right heel ([address] 8/8/2017    Coronary artery disease involving native coronary artery of native heart 3/15/2017    Successful stenting of Cx (Xience LESLEY) and RCA (Xience LESLEY) to 0% by Dr. Lissy Ludwig on 3/15/17.     DDD (degenerative disc disease), lumbar 1/26/2015    Dyslipidemia     Erectile dysfunction 7/5/2016    Euthyroid sick syndrome 4/6/2017    Hereditary peripheral neuropathy 11/15/2016    History of cardioversion 04/18/2017    S/P Synchronized external cardioversion (4/18/2017 - Dr. Mateus Limon)    History of vitamin D deficiency 4/22/2017    Vitamin D 25-Hydroxy (4/22/2017) = 12.0; Vitamin D 25-Hydroxy (5/26/2017) = 38.7    Infection of vascular bypass graft ([address] 7/24/2017    Left lower extremity    Ischemic cardiomyopathy     Moderate to severe pulmonary hypertension ([address] 07/23/2017    Paroxysmal atrial fibrillation ([address]     Peripheral artery disease ([address] 1/25/2017    Skin ulcer of malleolar area of right ankle ([address]     Spinal stenosis of lumbar region with radiculopathy 5/4/2015    Dr. Faustina Melgar     Past Surgical History:   Procedure Laterality Date    CARDIAC CATHETERIZATION  3/8/2017         CARDIAC CATHETERIZATION  3/15/2017         CORONARY STENT SINGLE W/PTCA  3/15/2017         HX ABOVE KNEE AMPUTATION Left 08/18/2017    S/P Left above-the-knee amputation (8/18/2017 - Dr. Nayeli Fam)    HX ATHERECTOMY Left 06/15/2017    S/P Atherectomy and balloon angioplasty of the left superficial femoral artery and above-knee popliteal artery (6/15/2017 - Dr. Nayeli Fma)    HX ATHERECTOMY Right 09/07/2017    S/P Atherectomy and balloon angioplasty of the below-the-knee popliteal artery, above-the-knee popliteal artery, tibioperoneal trunk artery and posterior tibial artery (9/7/2017 - Dr. Nayeli Fam)    HX COLONOSCOPY 07/23/2015    polyps repeat 2020 5 years [address] Right 04/04/2017    S/P Right axillary to bifemoral artery bypass using an 8 mm Propaten graft from the right axilla to the right common femoral artery with a jump femoral-femoral bypass with another 8 mm Propaten external ring bypass; Right common femoral artery, and profunda femoris artery and superficial femoral artery endarterectomy causing an extensive surgery on the right side (4/4/2017 - Dr. Elo Laguna)    [address] Right 04/07/2017    S/P Cutdown of femoral-femoral bypass, more on the left groin side; Right lower extremity angiography with first-order catheterization (4/7/2017 - Dr. Edgar Nance)    [address] Right 04/10/2017    S/P Right femoral to above-knee popliteal bypass with an 8 mm PTFE graft (4/10/2017 - Dr. Ana Davis)    HX OTHER SURGICAL Left 07/25/2017    S/P Removal of infected graft; Repair of left superficial femoral artery; Repair of left common femoral artery (7/25/2017 - Dr. Edgar Nance)    HX OTHER SURGICAL Right 06/27/2017    S/P Drainage of right side abscess (6/27/2017 - Dr. Edgar Nance)    HX OTHER SURGICAL Left 07/01/2017    S/P Left groin exploration; Left femoral repair pseudoaneurysm; Left wound washout; Wound VAC placement (7/01/2017 - Dr. Edgar Nance)    HX OTHER SURGICAL Left 07/04/2017    S/P Left common femoral artery ligation; Jump bypass from right to left fem-fem to a more distal superficial femoral artery using 8 mm external ringed Propaten graft; Left groin wound exploration and washout (7/4/2017 - Dr. Edgar Nance)    HX OTHER SURGICAL Right 08/18/2017    S/P Right axillary femoral jump bypass interposition; Removal of infected right axillary femoral bypass;  Wound VAC placement of upper thigh 1.2 cm x 5.2 cm x 1.8 cm and groin 4 cm x 0.5 cm x 0.2 cm (8/18/2017 - Dr. Edgar Nance)     Barriers to Learning/Limitations: yes; physical  Compensate with: Visual Cues, Verbal Cues, and Tactile Cues  Home Situation:   Home Situation  Home Environment: Private residence  One/Two Story Residence: One story  Living Alone: Yes  Support Systems: Child(mile)  Patient Expects to be Discharged to[de-identified] Unable to determine at this time  Current DME Used/Available at Home: Wheelchair,Walker, rolling (L prosthetic leg )  Critical Behavior:  Neurologic State: Alert  Orientation Level: Oriented X4  Cognition: Follows commands  Safety/Judgement: Fall prevention  Psychosocial  Patient Behaviors: Calm; Cooperative; Talkative                 Strength:    Strength: Generally decreased, functional                    Tone & Sensation:   Tone: Normal              Sensation: Impaired (decreased light touch sensation to distal RLE )               Range Of Motion:  AROM: Within functional limits          Functional Mobility:  Bed Mobility:  Rolling:  (NT this date)           Transfers:  Sit to Stand: Supervision  Stand to Sit: Supervision                       Balance:   Sitting: Intact  Standing: Impaired; With support    Ambulation/Gait Training:  Distance (ft): 30 Feet (ft)  Assistive Device: Walker, rolling  Ambulation - Level of Assistance: Stand-by assistance        Gait Abnormalities: Decreased step clearance    Speed/Suzanna: Slow  Step Length: Right shortened    Pain:  Pain level pre-treatment: 0/10   Pain level post-treatment: 0/10  Pain Intervention(s): Medication (see MAR); Rest, Ice, Repositioning   Response to intervention: Nurse notified    Activity Tolerance:   Good    Please refer to the flowsheet for vital signs taken during this treatment.   After treatment:   []         Patient left in no apparent distress sitting up in chair  [x]         Patient left in no apparent distress in bed  [x]         Call bell left within reach  [x]         Nursing notified  []         Caregiver present  []         Bed alarm activated  []         SCDs applied    COMMUNICATION/EDUCATION:   [x]         Role of Physical Therapy in the acute care setting. [x]         Fall prevention education was provided and the patient/caregiver indicated understanding. [x]         Patient/family have participated as able in goal setting and plan of care. [x]         Patient/family agree to work toward stated goals and plan of care. []         Patient understands intent and goals of therapy, but is neutral about his/her participation. []         Patient is unable to participate in goal setting/plan of care: ongoing with therapy staff.  []         Other:     Thank you for this referral.  Saeed Burciaga   Time Calculation: 18 mins      Eval Complexity: History: MEDIUM  Complexity : 1-2 comorbidities / personal factors will impact the outcome/ POC Exam:MEDIUM Complexity : 3 Standardized tests and measures addressing body structure, function, activity limitation and / or participation in recreation  Presentation: MEDIUM Complexity : Evolving with changing characteristics  Clinical Decision Making:Medium Complexity    Overall Complexity:MEDIUM

## 2022-03-03 NOTE — PROGRESS NOTES
Sitting up at bedside has completed physical therapy  Feels well  No ischemic pain  Podiatry surgery input appreciated, noted for amputation tomorrow  We will switch from heparin to Eliquis once clear from surgical standpoint

## 2022-03-03 NOTE — PROGRESS NOTES
Physician Progress Note      PATIENT:               Inez Stone  CSN #:                  [id number]  :                       1960  ADMIT DATE:       2022 9:37 AM  DISCH DATE:  RESPONDING  PROVIDER #:        Reagan Freitas DO          QUERY TEXT:    Patient admitted with Occlusion of right femoral-popliteal bypass graft now s/p right axillofemoral graft bypass to right anterior tibial bypass with cadaver vein 22, noted to have Paroxysmal atrial fibrillation and is maintained on Xarelto as outpatient . If possible, please document in progress notes and discharge summary if you are evaluating and/or treating any of the following: The medical record reflects the following:    Risk Factors: PMH PAD , chronic diastolic CHF and  AFIB    Clinical Indicators: Cara STRINGER PN Paroxysmal atrial fibrillation s/p remote cardioversion now appears persistent with RVR this admission. On CCB, BB and Xarelto as outpatient ,CHADS score of 3 ,ECHO-H/o NIMCY with improved EF 55-60% 2021    Treatment: Xarelto as outpatient , Heparin and Cardizem  gtt ,card consult and labs carefully monitored  Thank you  [email or web address]  Options provided:  -- Secondary hypercoagulable state in a patient with atrial fibrillation  -- Other - I will add my own diagnosis  -- Disagree - Not applicable / Not valid  -- Disagree - Clinically unable to determine / Unknown  -- Refer to Clinical Documentation Reviewer    PROVIDER RESPONSE TEXT:    This patient has secondary hypercoagulable state related to atrial fibrillation. Query created by: Shannon Angel on 3/2/2022 12:11 PM      QUERY TEXT:    Pt admitted Occlusion of right femoral-popliteal bypass graft now s/p right axillofemoral graft bypass to right anterior tibial bypass with cadaver vein 22 with  and has pmh of CHF documented.  If possible, please document in progress notes and discharge summary further specificity regarding the type and acuity of CHF:    The medical record reflects the following:  Risk Factors: Paroxysmal atrial fibrillation ,CHF , PAD,CAD  Clinical Indicators: Card PN -H/o Sherl Pitch with improved EF 55-60% 4/2021 ECHO result  Treatment: Card consult ,Lasix and losartan currently on hold ,wean cardizem drip as HR allows ,continued on heparin infusion  Thank you  [email or web address]  Options provided:  -- Chronic Systolic CHF/HFrEF  -- Chronic Diastolic CHF/HFpEF  -- Chronic Systolic and Diastolic CHF  -- Other - I will add my own diagnosis  -- Disagree - Not applicable / Not valid  -- Disagree - Clinically unable to determine / Unknown  -- Refer to Clinical Documentation Reviewer    PROVIDER RESPONSE TEXT:    Consulted cardiology    Query created by:  Yeimi Bowen on 3/2/2022 12:28 PM      Electronically signed by:  Binh Matt DO 3/3/2022 7:59 AM

## 2022-03-03 NOTE — PROGRESS NOTES
Cardiology Progress Note    Admit Date: 2/28/2022  Attending Cardiologist: Dr. Michelle Pineda:     -Occlusion of right femoral-popliteal bypass graft now s/p right axillofemoral graft bypass to right anterior tibial bypass with cadaver vein 2/28/22. -CAF: Patient previously had paroxysmal atrial fibrillation s/p remote cardioversion now appears persistent. With RVR this admission. On CCB, BB and Xarelto as outpatient.  -Hypokalemia. Continue replacement as needed. -PAD s/p previous right fempop bypass and previous left AKA. -CAD s/p Cx and RCA PCI 2017.  -H/o Garlan August with improved EF 55-60% 4/2021.  -Hypertension. Soft. -Hyperlipidemia. On statin.     Primary cardiologist Dr. Erasmo Cervantes.     Echo 4/2021  · LV: Estimated LVEF is 55 - 60%. Visually measured ejection fraction. Normal cavity size, wall thickness and systolic function (ejection fraction normal). Wall motion: normal. Age-appropriate left ventricular diastolic function. · AV: Probably trileaflet aortic valve. · RV: Not well visualized. · Pericardium: Pericardial fat pad present.          Plan:     I edited and deleted this note by mistake. Patient was seen on 03/03/22 by . And necessary management for provided by     Subjective:     No CP. No SOB.   Denies any palpitation    Objective:      Patient Vitals for the past 8 hrs:   Temp Pulse Resp BP SpO2   03/03/22 0900 -- 98 22 129/87 --   03/03/22 0800 98.3 °F (36.8 °C) (!) 113 17 (!) 140/91 95 %   03/03/22 0700 -- (!) 103 13 137/82 95 %   03/03/22 0600 -- 99 21 130/87 95 %   03/03/22 0400 98.3 °F (36.8 °C) 92 19 (!) 148/82 93 %   03/03/22 0300 -- 88 12 (!) 127/100 96 %   03/03/22 0200 -- 84 19 130/78 94 %         Patient Vitals for the past 96 hrs:   Weight   02/28/22 1104 177 lb 9.6 oz (80.6 kg)       TELE: AFIB               Current Facility-Administered Medications   Medication Dose Route Frequency Last Admin    polyethylene glycol (MIRALAX) packet 17 g  17 g Oral BID 17 g at 03/03/22 0833    docusate sodium (COLACE) capsule 100 mg  100 mg Oral BID      cetirizine (ZYRTEC) tablet 10 mg  10 mg Oral DAILY      dilTIAZem IR (CARDIZEM) tablet 60 mg  60 mg Oral Q6H 60 mg at 03/03/22 0552    ELECTROLYTE REPLACEMENT PROTOCOL - Potassium Standard Dosing   1 Each Other PRN      ELECTROLYTE REPLACEMENT PROTOCOL - Magnesium   1 Each Other PRN      sodium chloride (NS) flush 10 mL  10 mL IntraVENous Q8H 10 mL at 03/03/22 0600    dilTIAZem (CARDIZEM) 100 mg in 0.9% sodium chloride (MBP/ADV) 100 mL infusion  5 mg/hr IntraVENous TITRATE IV Completed at 03/02/22 1800    oxyCODONE-acetaminophen (PERCOCET) 5-325 mg per tablet 2 Tablet  2 Tablet Oral Q4H PRN 2 Tablet at 03/03/22 0418    heparin 25,000 units in  ml infusion  18-36 Units/kg/hr IntraVENous TITRATE 18 Units/kg/hr at 03/03/22 0706    aspirin chewable tablet 81 mg  81 mg Oral DAILY 81 mg at 03/03/22 0833    atorvastatin (LIPITOR) tablet 80 mg  80 mg Oral QHS 80 mg at 03/02/22 2149    [Held by provider] dilTIAZem ER (CARDIZEM CD) capsule 180 mg  180 mg Oral DAILY 180 mg at 03/01/22 0828    [Held by provider] furosemide (LASIX) tablet 20 mg  20 mg Oral DAILY 20 mg at 03/01/22 3182    gabapentin (NEURONTIN) capsule 400 mg  400 mg Oral  mg at 03/03/22 8720    [Held by provider] losartan (COZAAR) tablet 100 mg  100 mg Oral DAILY 100 mg at 03/01/22 0829    metoprolol tartrate (LOPRESSOR) tablet 50 mg  50 mg Oral BID 50 mg at 03/03/22 2857    oxyCODONE-acetaminophen (PERCOCET) 5-325 mg per tablet 1 Tablet  1 Tablet Oral Q4H PRN 1 Tablet at 03/01/22 1732    HYDROmorphone (DILAUDID) syringe 0.5-1 mg  0.5-1 mg IntraVENous Q2H PRN 1 mg at 03/01/22 1072    naloxone (NARCAN) injection 0.4 mg  0.4 mg IntraVENous PRN      metoprolol (LOPRESSOR) injection 5 mg  5 mg IntraVENous Q6H PRN 5 mg at 03/01/22 0731         Intake/Output Summary (Last 24 hours) at 3/3/2022 0908  Last data filed at 3/3/2022 0744  Gross per 24 hour Intake 483.08 ml   Output 1935 ml   Net -1451.92 ml       Physical Exam:  General:  alert, cooperative, no distress, appears stated age  Neck:  no JVD  Lungs:  clear to auscultation bilaterally doubt any significant rales  Heart:  irregularly irregular rhythm, tachycardic  Abdomen:  abdomen is soft without any significant tenderness  Extremities:  trace edema, L AKA    Visit Vitals  /87   Pulse 98   Temp 98.3 °F (36.8 °C)   Resp 22   Ht 5' 10\" (1.778 m)   Wt 177 lb 9.6 oz (80.6 kg)   SpO2 95%   BMI 25.48 kg/m²       Data Review:     Labs: Results:       Chemistry Recent Labs     03/02/22 1745 03/02/22  1214 03/02/22 0317 03/01/22 1740 03/01/22 0331 03/01/22 0331 02/28/22  1030 02/28/22  1030   GLU  --   --  96  --   --  104*  --  96   NA  --   --  139  --   --  141  --  140   K 4.0  --  3.3* 3.9   < > 3.1*   < > 3.4*   CL  --   --  111  --   --  113*  --  110   CO2  --   --  25  --   --  24  --  23   BUN  --   --  7  --   --  8  --  14   CREA  --   --  0.72  --   --  0.71  --  0.89   CA  --   --  8.1*  --   --  7.9*  --  9.2   MG  --  2.0  --  2.1  --  1.7  --   --    AGAP  --   --  3  --   --  4  --  7   BUCR  --   --  10*  --   --  11*  --  16    < > = values in this interval not displayed.       CBC w/Diff Recent Labs     03/02/22 0317 03/01/22 0331 02/28/22  1030   WBC 9.4 9.0 11.7   RBC 2.92* 2.93* 3.49*   HGB 8.6* 8.3* 10.2*   HCT 26.7* 26.1* 30.8*    359 451*   GRANS 66 69  --    LYMPH 21 19*  --    EOS 1 0  --       Cardiac Enzymes No results found for: CPK, CK, CKMMB, CKMB, RCK3, CKMBT, CKNDX, CKND1, LEO, TROPT, TROIQ, DEWAYNE, TROPT, TNIPOC, BNP, BNPP   Coagulation Recent Labs     03/03/22  0534 03/02/22  1214   APTT 119.3* 102.5*       Lipid Panel Lab Results   Component Value Date/Time    Cholesterol, total 83 12/15/2021 07:08 AM    HDL Cholesterol 22 (L) 12/15/2021 07:08 AM    LDL, calculated 43.2 12/15/2021 07:08 AM    VLDL, calculated 17.8 12/15/2021 07:08 AM    Triglyceride 89 12/15/2021 07:08 AM    CHOL/HDL Ratio 3.8 12/15/2021 07:08 AM      BNP No results found for: BNP, BNPP, XBNPT   Liver Enzymes No results for input(s): TP, ALB, TBIL, AP in the last 72 hours.     No lab exists for component: SGOT, GPT, DBIL   Thyroid Studies Lab Results   Component Value Date/Time    T4, Total 9.0 11/09/2017 10:28 AM    TSH 2.08 04/08/2021 07:05 AM          Signed By: JUNE Pinto     March 3, 2022

## 2022-03-03 NOTE — PROGRESS NOTES
4688: Pt stated he has \"issue with dry, itching skin this time of year\". Pt requested Zyrtec 10mg po Daily- \"that is what my doctor wants me to take for it\". Dr. Chinyere Landis paged, order placed for Zyrtec-see [facility]  1210: Pt continues to complain of itching mid to lower back and posterior left upper thigh- no rash noted- skin clean, dry and intact. Pt bathed, linen and bed pad changed. Dr. Chinyere Landis notified- benadryl ordered and administered-see MAR.

## 2022-03-04 ENCOUNTER — ANESTHESIA (OUTPATIENT)
Dept: SURGERY | Age: 62
DRG: 240 | End: 2022-03-04
Payer: COMMERCIAL

## 2022-03-04 LAB
ABO + RH BLD: NORMAL
ANION GAP SERPL CALC-SCNC: 5 MMOL/L (ref 3–18)
APTT PPP: 39.3 SEC (ref 23–36.4)
BASOPHILS # BLD: 0 K/UL (ref 0–0.1)
BASOPHILS NFR BLD: 0 % (ref 0–2)
BLOOD GROUP ANTIBODIES SERPL: NORMAL
BUN SERPL-MCNC: 7 MG/DL (ref 7–18)
BUN/CREAT SERPL: 9 (ref 12–20)
CALCIUM SERPL-MCNC: 9 MG/DL (ref 8.5–10.1)
CHLORIDE SERPL-SCNC: 111 MMOL/L (ref 100–111)
CO2 SERPL-SCNC: 25 MMOL/L (ref 21–32)
CREAT SERPL-MCNC: 0.75 MG/DL (ref 0.6–1.3)
DIFFERENTIAL METHOD BLD: ABNORMAL
EOSINOPHIL # BLD: 0.1 K/UL (ref 0–0.4)
EOSINOPHIL NFR BLD: 1 % (ref 0–5)
ERYTHROCYTE [DISTWIDTH] IN BLOOD BY AUTOMATED COUNT: 16.2 % (ref 11.6–14.5)
GLUCOSE SERPL-MCNC: 93 MG/DL (ref 74–99)
HCT VFR BLD AUTO: 27.1 % (ref 36–48)
HGB BLD-MCNC: 8.9 G/DL (ref 13–16)
IMM GRANULOCYTES # BLD AUTO: 0 K/UL (ref 0–0.04)
IMM GRANULOCYTES NFR BLD AUTO: 0 % (ref 0–0.5)
LYMPHOCYTES # BLD: 1.6 K/UL (ref 0.9–3.6)
LYMPHOCYTES NFR BLD: 16 % (ref 21–52)
MCH RBC QN AUTO: 29.2 PG (ref 24–34)
MCHC RBC AUTO-ENTMCNC: 32.8 G/DL (ref 31–37)
MCV RBC AUTO: 88.9 FL (ref 78–100)
MONOCYTES # BLD: 1 K/UL (ref 0.05–1.2)
MONOCYTES NFR BLD: 10 % (ref 3–10)
NEUTS SEG # BLD: 7.4 K/UL (ref 1.8–8)
NEUTS SEG NFR BLD: 73 % (ref 40–73)
NRBC # BLD: 0 K/UL (ref 0–0.01)
NRBC BLD-RTO: 0 PER 100 WBC
PLATELET # BLD AUTO: 394 K/UL (ref 135–420)
PMV BLD AUTO: 9 FL (ref 9.2–11.8)
POTASSIUM SERPL-SCNC: 3.6 MMOL/L (ref 3.5–5.5)
RBC # BLD AUTO: 3.05 M/UL (ref 4.35–5.65)
SODIUM SERPL-SCNC: 141 MMOL/L (ref 136–145)
SPECIMEN EXP DATE BLD: NORMAL
WBC # BLD AUTO: 10.2 K/UL (ref 4.6–13.2)

## 2022-03-04 PROCEDURE — 77030018836 HC SOL IRR NACL ICUM -A: Performed by: PODIATRIST

## 2022-03-04 PROCEDURE — 86900 BLOOD TYPING SEROLOGIC ABO: CPT

## 2022-03-04 PROCEDURE — 2709999900 HC NON-CHARGEABLE SUPPLY

## 2022-03-04 PROCEDURE — 74011250636 HC RX REV CODE- 250/636: Performed by: NURSE ANESTHETIST, CERTIFIED REGISTERED

## 2022-03-04 PROCEDURE — 74011000250 HC RX REV CODE- 250: Performed by: NURSE ANESTHETIST, CERTIFIED REGISTERED

## 2022-03-04 PROCEDURE — 74011250636 HC RX REV CODE- 250/636: Performed by: SURGERY

## 2022-03-04 PROCEDURE — 2709999900 HC NON-CHARGEABLE SUPPLY: Performed by: PODIATRIST

## 2022-03-04 PROCEDURE — 88305 TISSUE EXAM BY PATHOLOGIST: CPT

## 2022-03-04 PROCEDURE — 74011250637 HC RX REV CODE- 250/637: Performed by: SURGERY

## 2022-03-04 PROCEDURE — 0Y6M0Z8 DETACHMENT AT RIGHT FOOT, COMPLETE 5TH RAY, OPEN APPROACH: ICD-10-PCS | Performed by: SURGERY

## 2022-03-04 PROCEDURE — 74011250637 HC RX REV CODE- 250/637: Performed by: PHYSICIAN ASSISTANT

## 2022-03-04 PROCEDURE — 99233 SBSQ HOSP IP/OBS HIGH 50: CPT | Performed by: INTERNAL MEDICINE

## 2022-03-04 PROCEDURE — 85730 THROMBOPLASTIN TIME PARTIAL: CPT

## 2022-03-04 PROCEDURE — 77030009814 HC LDWRE ECG PHIL -B

## 2022-03-04 PROCEDURE — 77030002986 HC SUT PROL J&J -A: Performed by: PODIATRIST

## 2022-03-04 PROCEDURE — 77030036687 HC SHOE PSTOP S2SG -A: Performed by: PODIATRIST

## 2022-03-04 PROCEDURE — 01480 ANES OPEN PX LOWER L/A/F NOS: CPT | Performed by: ANESTHESIOLOGY

## 2022-03-04 PROCEDURE — 65660000004 HC RM CVT STEPDOWN

## 2022-03-04 PROCEDURE — 74011000250 HC RX REV CODE- 250: Performed by: SURGERY

## 2022-03-04 PROCEDURE — 36415 COLL VENOUS BLD VENIPUNCTURE: CPT

## 2022-03-04 PROCEDURE — 76010000138 HC OR TIME 0.5 TO 1 HR: Performed by: PODIATRIST

## 2022-03-04 PROCEDURE — 85025 COMPLETE CBC W/AUTO DIFF WBC: CPT

## 2022-03-04 PROCEDURE — 0Y6M0Z7 DETACHMENT AT RIGHT FOOT, COMPLETE 4TH RAY, OPEN APPROACH: ICD-10-PCS | Performed by: SURGERY

## 2022-03-04 PROCEDURE — 77030006788 HC BLD SAW OSC STRY -B: Performed by: PODIATRIST

## 2022-03-04 PROCEDURE — 76060000032 HC ANESTHESIA 0.5 TO 1 HR: Performed by: PODIATRIST

## 2022-03-04 PROCEDURE — 01480 ANES OPEN PX LOWER L/A/F NOS: CPT | Performed by: NURSE ANESTHETIST, CERTIFIED REGISTERED

## 2022-03-04 PROCEDURE — 76210000016 HC OR PH I REC 1 TO 1.5 HR: Performed by: PODIATRIST

## 2022-03-04 PROCEDURE — 88311 DECALCIFY TISSUE: CPT

## 2022-03-04 PROCEDURE — 74011000250 HC RX REV CODE- 250: Performed by: PODIATRIST

## 2022-03-04 PROCEDURE — 74011250636 HC RX REV CODE- 250/636: Performed by: INTERNAL MEDICINE

## 2022-03-04 PROCEDURE — 80048 BASIC METABOLIC PNL TOTAL CA: CPT

## 2022-03-04 RX ORDER — PROPOFOL 10 MG/ML
INJECTION, EMULSION INTRAVENOUS AS NEEDED
Status: DISCONTINUED | OUTPATIENT
Start: 2022-03-04 | End: 2022-03-04 | Stop reason: HOSPADM

## 2022-03-04 RX ORDER — LIDOCAINE HYDROCHLORIDE 20 MG/ML
INJECTION, SOLUTION EPIDURAL; INFILTRATION; INTRACAUDAL; PERINEURAL AS NEEDED
Status: DISCONTINUED | OUTPATIENT
Start: 2022-03-04 | End: 2022-03-04 | Stop reason: HOSPADM

## 2022-03-04 RX ORDER — SODIUM CHLORIDE, SODIUM LACTATE, POTASSIUM CHLORIDE, CALCIUM CHLORIDE 600; 310; 30; 20 MG/100ML; MG/100ML; MG/100ML; MG/100ML
INJECTION, SOLUTION INTRAVENOUS
Status: DISCONTINUED | OUTPATIENT
Start: 2022-03-04 | End: 2022-03-04 | Stop reason: HOSPADM

## 2022-03-04 RX ORDER — CEFAZOLIN SODIUM 1 G/3ML
INJECTION, POWDER, FOR SOLUTION INTRAMUSCULAR; INTRAVENOUS AS NEEDED
Status: DISCONTINUED | OUTPATIENT
Start: 2022-03-04 | End: 2022-03-04 | Stop reason: HOSPADM

## 2022-03-04 RX ORDER — DIGOXIN 0.25 MG/ML
500 INJECTION INTRAMUSCULAR; INTRAVENOUS
Status: COMPLETED | OUTPATIENT
Start: 2022-03-04 | End: 2022-03-04

## 2022-03-04 RX ADMIN — SODIUM CHLORIDE, SODIUM LACTATE, POTASSIUM CHLORIDE, AND CALCIUM CHLORIDE: 600; 310; 30; 20 INJECTION, SOLUTION INTRAVENOUS at 16:05

## 2022-03-04 RX ADMIN — HEPARIN SODIUM 20 UNITS/KG/HR: 1000 INJECTION INTRAVENOUS; SUBCUTANEOUS at 23:06

## 2022-03-04 RX ADMIN — HYDROMORPHONE HYDROCHLORIDE 1 MG: 1 INJECTION, SOLUTION INTRAMUSCULAR; INTRAVENOUS; SUBCUTANEOUS at 17:26

## 2022-03-04 RX ADMIN — METOPROLOL TARTRATE 50 MG: 50 TABLET, FILM COATED ORAL at 18:48

## 2022-03-04 RX ADMIN — DIGOXIN 500 MCG: 250 INJECTION, SOLUTION INTRAMUSCULAR; INTRAVENOUS; PARENTERAL at 09:30

## 2022-03-04 RX ADMIN — PROPOFOL 50 MG: 10 INJECTION, EMULSION INTRAVENOUS at 16:29

## 2022-03-04 RX ADMIN — DILTIAZEM HYDROCHLORIDE 120 MG: 120 CAPSULE, COATED, EXTENDED RELEASE ORAL at 18:47

## 2022-03-04 RX ADMIN — PROPOFOL 50 MG: 10 INJECTION, EMULSION INTRAVENOUS at 16:18

## 2022-03-04 RX ADMIN — METOPROLOL TARTRATE 50 MG: 50 TABLET, FILM COATED ORAL at 09:24

## 2022-03-04 RX ADMIN — GABAPENTIN 400 MG: 400 CAPSULE ORAL at 09:24

## 2022-03-04 RX ADMIN — ATORVASTATIN CALCIUM 80 MG: 40 TABLET, FILM COATED ORAL at 21:17

## 2022-03-04 RX ADMIN — LIDOCAINE HYDROCHLORIDE 80 MG: 20 INJECTION, SOLUTION EPIDURAL; INFILTRATION; INTRACAUDAL; PERINEURAL at 16:18

## 2022-03-04 RX ADMIN — PROPOFOL 50 MG: 10 INJECTION, EMULSION INTRAVENOUS at 16:21

## 2022-03-04 RX ADMIN — CEFAZOLIN 2 G: 330 INJECTION, POWDER, FOR SOLUTION INTRAMUSCULAR; INTRAVENOUS at 16:28

## 2022-03-04 RX ADMIN — SODIUM CHLORIDE, PRESERVATIVE FREE 10 ML: 5 INJECTION INTRAVENOUS at 23:05

## 2022-03-04 RX ADMIN — PROPOFOL 50 MG: 10 INJECTION, EMULSION INTRAVENOUS at 16:34

## 2022-03-04 RX ADMIN — SODIUM CHLORIDE, PRESERVATIVE FREE 10 ML: 5 INJECTION INTRAVENOUS at 21:19

## 2022-03-04 RX ADMIN — ASPIRIN 81 MG 81 MG: 81 TABLET ORAL at 09:00

## 2022-03-04 RX ADMIN — CETIRIZINE HYDROCHLORIDE 10 MG: 10 TABLET, FILM COATED ORAL at 09:24

## 2022-03-04 RX ADMIN — DILTIAZEM HYDROCHLORIDE 180 MG: 180 CAPSULE, COATED, EXTENDED RELEASE ORAL at 07:57

## 2022-03-04 RX ADMIN — PROPOFOL 50 MG: 10 INJECTION, EMULSION INTRAVENOUS at 16:19

## 2022-03-04 RX ADMIN — HYDROMORPHONE HYDROCHLORIDE 1 MG: 1 INJECTION, SOLUTION INTRAMUSCULAR; INTRAVENOUS; SUBCUTANEOUS at 19:53

## 2022-03-04 RX ADMIN — PROPOFOL 50 MG: 10 INJECTION, EMULSION INTRAVENOUS at 16:25

## 2022-03-04 RX ADMIN — GABAPENTIN 400 MG: 400 CAPSULE ORAL at 19:53

## 2022-03-04 RX ADMIN — SODIUM CHLORIDE, PRESERVATIVE FREE 10 ML: 5 INJECTION INTRAVENOUS at 07:16

## 2022-03-04 NOTE — ROUTINE PROCESS
TRANSFER - IN REPORT:    Verbal report received from Dave Wiggins RN  on Estefania Sifuentes  being received from [facility] ICU  for ordered procedure      Report consisted of patients Situation, Background, Assessment and   Recommendations(SBAR). Information from the following report(s) SBAR was reviewed with the receiving nurse. Opportunity for questions and clarification was provided. Assessment completed upon patients arrival to unit and care assumed.

## 2022-03-04 NOTE — PROGRESS NOTES
Cardiology Progress Note    Admit Date: 2/28/2022  Attending Cardiologist: Dr. Mercer Diamante:     -Occlusion of right femoral-popliteal bypass graft now s/p right axillofemoral graft bypass to right anterior tibial bypass with cadaver vein 2/28/22. -CAF: Patient previously had paroxysmal atrial fibrillation s/p remote cardioversion now appears persistent. With RVR this admission. On CCB, BB and Xarelto as outpatient.  -Hypokalemia. Continue replacement as needed. -PAD s/p previous right fempop bypass and previous left AKA. -CAD s/p Cx and RCA PCI 2017.  -H/o Arvella Mis with improved EF 55-60% 4/2021.  -Hypertension. Soft. -Hyperlipidemia. On statin.     Primary cardiologist Dr. Danny Miller.     Echo 4/2021  · LV: Estimated LVEF is 55 - 60%. Visually measured ejection fraction. Normal cavity size, wall thickness and systolic function (ejection fraction normal). Wall motion: normal. Age-appropriate left ventricular diastolic function. · AV: Probably trileaflet aortic valve. · RV: Not well visualized. · Pericardium: Pericardial fat pad present.          Plan:     Patient's heart rate is still elevated this morning. Received oral dose of Cardizem and beta-blocker already. I will give digoxin 500 mcg IV once  Plan for foot procedure by podiatry team noted for today  Plan to restart Xarelto once no further procedures planned  Continue rest of the cardiac medication    Subjective:     No CP. No SOB.   Denies any palpitation    Objective:      Patient Vitals for the past 8 hrs:   Temp Pulse Resp BP SpO2   03/04/22 0800 98.3 °F (36.8 °C) (!) 157 23 137/83 98 %   03/04/22 0700 -- (!) 113 19 (!) 143/92 98 %   03/04/22 0600 -- 100 16 (!) 140/71 95 %   03/04/22 0509 -- 93 19 124/79 98 %   03/04/22 0500 -- (!) 117 18 (!) 141/93 97 %   03/04/22 0400 98.1 °F (36.7 °C) 93 17 120/74 99 %   03/04/22 0300 -- (!) 118 24 -- 90 %   03/04/22 0200 -- 97 17 -- 94 %         Patient Vitals for the past 96 hrs:   Weight   03/03/22 1055 80.3 kg (177 lb)   02/28/22 1104 80.6 kg (177 lb 9.6 oz)       TELE: AFIB               Current Facility-Administered Medications   Medication Dose Route Frequency Last Admin    polyethylene glycol (MIRALAX) packet 17 g  17 g Oral BID 17 g at 03/03/22 0833    docusate sodium (COLACE) capsule 100 mg  100 mg Oral  mg at 03/03/22 0924    cetirizine (ZYRTEC) tablet 10 mg  10 mg Oral DAILY 10 mg at 03/04/22 6875    dilTIAZem ER (CARDIZEM CD) capsule 120 mg  120 mg Oral  mg at 03/03/22 1706    dilTIAZem ER (CARDIZEM CD) capsule 180 mg  180 mg Oral  mg at 03/04/22 0757    diphenhydrAMINE (BENADRYL) capsule 25 mg  25 mg Oral Q6H PRN 25 mg at 03/03/22 1439    ELECTROLYTE REPLACEMENT PROTOCOL - Potassium Standard Dosing   1 Each Other PRN      ELECTROLYTE REPLACEMENT PROTOCOL - Magnesium   1 Each Other PRN      sodium chloride (NS) flush 10 mL  10 mL IntraVENous Q8H 10 mL at 03/04/22 0716    oxyCODONE-acetaminophen (PERCOCET) 5-325 mg per tablet 2 Tablet  2 Tablet Oral Q4H PRN 2 Tablet at 03/03/22 2153    aspirin chewable tablet 81 mg  81 mg Oral DAILY 81 mg at 03/04/22 0900    atorvastatin (LIPITOR) tablet 80 mg  80 mg Oral QHS 80 mg at 03/03/22 2148    [Held by provider] furosemide (LASIX) tablet 20 mg  20 mg Oral DAILY 20 mg at 03/01/22 7142    gabapentin (NEURONTIN) capsule 400 mg  400 mg Oral  mg at 03/04/22 0924    [Held by provider] losartan (COZAAR) tablet 100 mg  100 mg Oral DAILY 100 mg at 03/01/22 0829    metoprolol tartrate (LOPRESSOR) tablet 50 mg  50 mg Oral BID 50 mg at 03/04/22 0924    oxyCODONE-acetaminophen (PERCOCET) 5-325 mg per tablet 1 Tablet  1 Tablet Oral Q4H PRN 1 Tablet at 03/01/22 1732    HYDROmorphone (DILAUDID) syringe 0.5-1 mg  0.5-1 mg IntraVENous Q2H PRN 1 mg at 03/01/22 0614    naloxone (NARCAN) injection 0.4 mg  0.4 mg IntraVENous PRN      metoprolol (LOPRESSOR) injection 5 mg  5 mg IntraVENous Q6H PRN 5 mg at 03/01/22 0731         Intake/Output Summary (Last 24 hours) at 3/4/2022 0944  Last data filed at 3/4/2022 0753  Gross per 24 hour   Intake 278.01 ml   Output 1750 ml   Net -1471.99 ml       Physical Exam:  General:  alert, cooperative, no distress, appears stated age  Neck:  no JVD  Lungs:  clear to auscultation bilaterally doubt any significant rales  Heart:  irregularly irregular rhythm, tachycardic  Abdomen:  abdomen is soft without any significant tenderness  Extremities:  trace edema, L AKA    Visit Vitals  /83 (BP 1 Location: Right upper arm, BP Patient Position: At rest)   Pulse (!) 157   Temp 98.3 °F (36.8 °C)   Resp 23   Ht 5' 10\" (1.778 m)   Wt 80.3 kg (177 lb)   SpO2 98%   BMI 25.40 kg/m²       Data Review:     Labs: Results:       Chemistry Recent Labs     03/04/22  0550 03/02/22  1745 03/02/22  1214 03/02/22  0317 03/01/22  1740 03/01/22  1740   GLU 93  --   --  96  --   --      --   --  139  --   --    K 3.6 4.0  --  3.3*   < > 3.9     --   --  111  --   --    CO2 25  --   --  25  --   --    BUN 7  --   --  7  --   --    CREA 0.75  --   --  0.72  --   --    CA 9.0  --   --  8.1*  --   --    MG  --   --  2.0  --   --  2.1   AGAP 5  --   --  3  --   --    BUCR 9*  --   --  10*  --   --     < > = values in this interval not displayed.       CBC w/Diff Recent Labs     03/04/22  0550 03/02/22 0317   WBC 10.2 9.4   RBC 3.05* 2.92*   HGB 8.9* 8.6*   HCT 27.1* 26.7*    360   GRANS 73 66   LYMPH 16* 21   EOS 1 1      Cardiac Enzymes No results found for: CPK, CK, CKMMB, CKMB, RCK3, CKMBT, CKNDX, CKND1, LEO, TROPT, TROIQ, DEWAYNE, TROPT, TNIPOC, BNP, BNPP   Coagulation Recent Labs     03/04/22  0550 03/03/22  1328   APTT 39.3* 68.3*       Lipid Panel Lab Results   Component Value Date/Time    Cholesterol, total 83 12/15/2021 07:08 AM    HDL Cholesterol 22 (L) 12/15/2021 07:08 AM    LDL, calculated 43.2 12/15/2021 07:08 AM    VLDL, calculated 17.8 12/15/2021 07:08 AM    Triglyceride 89 12/15/2021 07:08 AM    CHOL/HDL Ratio 3.8 12/15/2021 07:08 AM      BNP No results found for: BNP, BNPP, XBNPT   Liver Enzymes No results for input(s): TP, ALB, TBIL, AP in the last 72 hours.     No lab exists for component: SGOT, GPT, DBIL   Thyroid Studies Lab Results   Component Value Date/Time    T4, Total 9.0 11/09/2017 10:28 AM    TSH 2.08 04/08/2021 07:05 AM          Signed By: Chucky Rodriguez MD     March 4, 2022

## 2022-03-04 NOTE — WOUND CARE
Physical Exam   Room 2355: Focused wound assess  Discussed care with primary nurse Criselda Grace. Left buttock abscess, hard to visualize but looks like pinhole opening, cloudy yellow drainage, no periwound redness, light purple discoloration, pt unaware of how long this wound has been there, silicone dressing in use & is appropriate at this time. Discussed with Francesco Modi RN who states will notify MD for evaluation. Topical prevention & treatment orders per nursing unit skin care protocol. Pt on Vik bed. Will turn over care to nursing staff at this time.   Kailyn ROWANN, RN, [facility], [address]

## 2022-03-04 NOTE — PROGRESS NOTES
Comprehensive Nutrition Assessment    Type and Reason for Visit: Initial,Positive nutrition screen,NPO/clear liquid    Nutrition Recommendations/Plan:   - Monitor readiness for diet resumption following procedure    Nutrition Assessment:  Pt off unit at time of visit. Is NPO for procedure, amputation of right fourth and fifth toes with possible partial metatarsal amputation. Podiatry following. Noted weight fluctuation with more recent wt loss PTA per chart hx. Pt was on po diet during previous days; noted fair po intake x 1 meal per chart documentation. Malnutrition Assessment:  Malnutrition Status:  Insufficient data      Nutrition History and Allergies: Past medical hx: hypertensive heart disease with systolic CHF, dyslipidemia, vitamin D deficiency, PAD, hx of left AKA in August 2017. Wt trends PTA per chart hx:  200-211 lb in 2015, 185-195 lb in 2016,  187 lb on 2/3/2017,   179 lb on 2/24/2017, 160 lb on 6/2/2017,    140 lb on 9/15/2017,  136 lb on 9/27/2017,  143 lb on 10/25/2017, 184 lb on 2/19/2018,  194 lb on 6/19/2018,   202 lb on 9/20/2018, 194 lb on 4/12/2021,  188 lb on 12/21/2021,   177 lb on 3/4/2022. No known food allergies     Estimated Daily Nutrient Needs:  Energy (kcal): 0223-2551; Weight Used for Energy Requirements: Current (80.3 kg)  Protein (g): 64-96; Weight Used for Protein Requirements: Current (x0.8-1.2)  Fluid (ml/day): 6723-9755; Method Used for Fluid Requirements: 1 ml/kcal      Nutrition Related Findings:  BM 3/3. No edema.        Wounds:    Surgical incision (arterial ulcers and abscess)       Current Nutrition Therapies:  DIET NPO    Anthropometric Measures:  · Height:  5' 10\" (177.8 cm)  · Current Body Wt:  80.3 kg (177 lb)   · Admission Body Wt:  177 lb 11.1 oz (standing scale)    · Usual Body Wt:  85.3 kg (188 lb) (12/21/2021 per chart hx)     · Ideal Body Wt:  166 lbs:  106.6 %   · Adjusted Body Weight:  194.9; Weight Adjustment for: Amputation   · Adjusted BMI:  28 · BMI Category: Overweight (BMI 25.0-29.9) (per adjusted BMI)       Nutrition Diagnosis:   · No nutrition diagnosis at this time          Nutrition Interventions:   Food and/or Nutrient Delivery: Continue NPO  Nutrition Education and Counseling: No recommendations at this time,Education not indicated  Coordination of Nutrition Care: Continue to monitor while inpatient    Goals:  PO nutrition intake will meet >75% of patient's estimated nutrition needs within the next follow up date       Nutrition Monitoring and Evaluation:   Behavioral-Environmental Outcomes: None identified  Food/Nutrient Intake Outcomes: Food and nutrient intake  Physical Signs/Symptoms Outcomes: Biochemical data,Meal time behavior    Discharge Planning:     Too soon to determine     Electronically signed by Matt Gupta RD on 3/4/2022 at 4:52 PM    Contact: [phone]

## 2022-03-04 NOTE — H&P
Update History & Physical    The Patient's History and Physical of March N/A,   surgery was reviewed with the patient and I examined the patient. There was no change. The surgical site was confirmed by the patient and me. Plan:  The risk, benefits, expected outcome, and alternative to the recommended procedure have been discussed with the patient. Patient understands and wants to proceed with the procedure.     Electronically signed by Harpal Hawkins DPM on 3/4/2022 at 4:04 PM

## 2022-03-04 NOTE — PROGRESS NOTES
0700- Change of shift, report received from Wilfredo Fisher RN. Will assume care of the patient. 80- S. Popeye at bedside and made aware of increased HR with activity. Verbal order to give 500 mcg Digoxin IVP now. 3659- Per Dr Vern Kennedy OK to give PO morning medications. 1030- Following digoxin and PO meds HR now 75-90 A fibb. /81 (95)    1230- Spoke with Dr Troy Jaramillo. Patient OK to transfer to CVT- SDU.    1300- Patient given additional full CHG bath in preporation for scheduled surgery. 1450- Patient taken off the floor by OR transporter.

## 2022-03-04 NOTE — PROGRESS NOTES
Going for toe amp today, heparin on hold  Resume heparin after surgery, transition to Eliquis and aspirin  Patient looks well  Podiatry and cardiology appreciated  Foot is warm and well vascularized

## 2022-03-04 NOTE — PERIOP NOTES
Assumed care of pt from OR via bed. Attached to monitor. VSS. OR, MAR and anesthesia report appreciated. Will cont to monitor. VSS. 1822  TRANSFER - OUT REPORT:    Verbal report given to Felicia Segovia RN (name) on Cristina Mckeon  being transferred to CVT Stepdown (unit) for routine post - op       Report consisted of patients Situation, Background, Assessment and   Recommendations(SBAR). Information from the following report(s) SBAR, OR Summary, MAR and Cardiac Rhythm atrial fib was reviewed with the receiving nurse. Lines:   Peripheral IV 03/01/22 Right Antecubital (Active)   Site Assessment Clean, dry, & intact 03/04/22 1200   Phlebitis Assessment 0 03/04/22 1200   Infiltration Assessment 0 03/04/22 1200   Dressing Status Clean, dry, & intact 03/04/22 1200   Dressing Type Transparent 03/04/22 1200   Hub Color/Line Status Blue;Flushed 03/04/22 1200   Action Taken Open ports on tubing capped 03/04/22 1200   Alcohol Cap Used Yes 03/04/22 1200       Peripheral IV 03/02/22 Anterior; Left Wrist (Active)   Site Assessment Clean, dry, & intact 03/04/22 1200   Phlebitis Assessment 0 03/04/22 1200   Infiltration Assessment 0 03/04/22 1200   Dressing Status Clean, dry, & intact 03/04/22 1200   Dressing Type Transparent 03/04/22 1200   Hub Color/Line Status Blue; Infusing 03/04/22 1200   Action Taken Open ports on tubing capped 03/04/22 1200   Alcohol Cap Used Yes 03/04/22 1200        Opportunity for questions and clarification was provided.       Patient transported with:   [employer]

## 2022-03-04 NOTE — PROGRESS NOTES
Patient to got to CVT- SDU room 2307 following surgery. Report given to Rosetta Alexander RN who will assume care following OR.

## 2022-03-04 NOTE — PROGRESS NOTES
19:15  Received pt in bed. Pt. A&O x 4. Pleasant. Denied pain. Heparin drip infusing at 20 units/kg/hr. Left AKA noted. Right foot with blackened 4th and 5th toes. Dressing to heel. Pt. To be NPO at midnight for surgery to amputate necrotic toes. 22:00  CHG bath given with wipes. Just to left of gluteal cleft pt has 0.75 x 0.5 cm abscess with small amt of serous drainage. Cleansed site with saline and dressed with foam dressing. Tender to touch. Dressing to right heel changed. 00:15  Heparin turned off. Pt. NPO - all drinks/food removed from tray. 03:00  Pt. Voided for 400 ml of clear, yellow urine. 05:10  Second CHG bath given. Pre-procedure check list completed.

## 2022-03-04 NOTE — ANESTHESIA PREPROCEDURE EVALUATION
Relevant Problems   No relevant active problems       Anesthetic History   No history of anesthetic complications            Review of Systems / Medical History  Patient summary reviewed and pertinent labs reviewed    Pulmonary  Within defined limits                 Neuro/Psych   Within defined limits           Cardiovascular            Dysrhythmias : atrial fibrillation  CAD, PAD and cardiac stents    Exercise tolerance: <4 METS     GI/Hepatic/Renal                Endo/Other      Hypothyroidism: well controlled  Arthritis     Other Findings              Physical Exam    Airway  Mallampati: III  TM Distance: 4 - 6 cm  Neck ROM: decreased range of motion   Mouth opening: Diminished (comment)     Cardiovascular    Rhythm: regular  Rate: normal         Dental    Dentition: Full lower dentures and Full upper dentures     Pulmonary  Breath sounds clear to auscultation               Abdominal  GI exam deferred       Other Findings            Anesthetic Plan    ASA: 3  Anesthesia type: MAC            Anesthetic plan and risks discussed with: Patient

## 2022-03-04 NOTE — PROGRESS NOTES
Discharge planning:    Chart reviewed. Patient not medically stable for discharge. Patient scheduled for toe amputation today.  will continue to monitor and assist with transition of care needs.     CHRISTOPHER Marie, RN  Care Management

## 2022-03-04 NOTE — PROGRESS NOTES
19:15  Received pt in bed. Pt. A&O x 4. Pleasant. Denied pain. Heparin drip infusing at 20 units/kg/hr. Left AKA noted. Right foot with blackened 4th and 5th toes. Dressing to heel. Pt. To be NPO at midnight for surgery to amputate necrotic toes. 22:00  CHG bath given with wipes. Just to left of gluteal cleft pt has 0.75 x 0.5 cm abscess with small amt of serous drainage. Cleansed site with saline and dressed with foam dressing. Tender to touch. Dressing to right heel changed.

## 2022-03-04 NOTE — BRIEF OP NOTE
Brief Postoperative Note    Patient: Norma Stratton  YOB: 1960  MRN: [id number]    Date of Procedure: 3/4/2022     Pre-Op Diagnosis: DX    Post-Op Diagnosis: Same as preoperative diagnosis.       Procedure(s):  AMPUTATION RIGHT FOURTH AND FIFTH TOES POSSIBLE PARTIAL METATARSAL AMPUTATION    Surgeon(s):  Pasquale Lyles DPM    Surgical Assistant: Surg Asst-1: [employer] [employer]    Anesthesia: MAC     Estimated Blood Loss (mL): Minimal    Complications: None    Specimens:   ID Type Source Tests Collected by Time Destination   1 : Right foot fourth and fifth toes Preservative Toe  Pasquale Lyles [place] 3/4/2022 1632 Pathology   2 : Right foot fifth head Preservative Toe  Pasquale Lyles, [place] 3/4/2022 1635 Pathology        Implants: * No implants in log *    Drains: * No LDAs found *    Findings: gangrene distal fourth and fifth ray's    Electronically Signed by Thai Hdz DPM on 3/4/2022 at 4:39 PM

## 2022-03-04 NOTE — PROGRESS NOTES
Problem: Pressure Injury - Risk of  Goal: *Prevention of pressure injury  Description: Document Efrem Scale and appropriate interventions in the flowsheet. Outcome: Progressing Towards Goal  Note: Pressure Injury Interventions:  Sensory Interventions: Assess changes in LOC,Avoid rigorous massage over bony prominences,Turn and reposition approx. every two hours (pillows and wedges if needed)         Activity Interventions: Assess need for specialty bed,Chair cushion,Pressure redistribution bed/mattress(bed type)    Mobility Interventions: Assess need for specialty bed,Pressure redistribution bed/mattress (bed type),Turn and reposition approx. every two hours(pillow and wedges)    Nutrition Interventions: Document food/fluid/supplement intake,Discuss nutritional consult with provider    Friction and Shear Interventions: Apply protective barrier, creams and emollients,Foam dressings/transparent film/skin sealants,Transfer aides:transfer board/Flash lift/ceiling lift,Lift sheet         Problem: Falls - Risk of  Goal: *Absence of Falls  Description: Document Antonio Fall Risk and appropriate interventions in the flowsheet.   Outcome: Progressing Towards Goal  Note: Fall Risk Interventions:  Mobility Interventions: Assess mobility with egress test,Communicate number of staff needed for ambulation/transfer,Strengthening exercises (ROM-active/passive),Utilize walker, cane, or other assistive device         Medication Interventions: Assess postural VS orthostatic hypotension,Evaluate medications/consider consulting pharmacy,Patient to call before getting OOB,Teach patient to arise slowly    Elimination Interventions: Call light in reach,Patient to call for help with toileting needs,Stay With Me (per policy),Toilet paper/wipes in reach,Toileting schedule/hourly rounds,Urinal in reach    History of Falls Interventions: Door open when patient unattended,Room close to nurse's station,Assess for delayed presentation/identification of injury for 48 hrs (comment for end date)

## 2022-03-05 LAB
APTT PPP: 69.9 SEC (ref 23–36.4)
BASOPHILS # BLD: 0 K/UL (ref 0–0.1)
BASOPHILS NFR BLD: 0 % (ref 0–2)
DIFFERENTIAL METHOD BLD: ABNORMAL
EOSINOPHIL # BLD: 0.1 K/UL (ref 0–0.4)
EOSINOPHIL NFR BLD: 1 % (ref 0–5)
ERYTHROCYTE [DISTWIDTH] IN BLOOD BY AUTOMATED COUNT: 16.2 % (ref 11.6–14.5)
HCT VFR BLD AUTO: 26.7 % (ref 36–48)
HGB BLD-MCNC: 8.8 G/DL (ref 13–16)
IMM GRANULOCYTES # BLD AUTO: 0 K/UL (ref 0–0.04)
IMM GRANULOCYTES NFR BLD AUTO: 0 % (ref 0–0.5)
LYMPHOCYTES # BLD: 1.7 K/UL (ref 0.9–3.6)
LYMPHOCYTES NFR BLD: 17 % (ref 21–52)
MCH RBC QN AUTO: 29.1 PG (ref 24–34)
MCHC RBC AUTO-ENTMCNC: 33 G/DL (ref 31–37)
MCV RBC AUTO: 88.4 FL (ref 78–100)
MONOCYTES # BLD: 1 K/UL (ref 0.05–1.2)
MONOCYTES NFR BLD: 10 % (ref 3–10)
NEUTS SEG # BLD: 7.4 K/UL (ref 1.8–8)
NEUTS SEG NFR BLD: 72 % (ref 40–73)
NRBC # BLD: 0 K/UL (ref 0–0.01)
NRBC BLD-RTO: 0 PER 100 WBC
PLATELET # BLD AUTO: 395 K/UL (ref 135–420)
PMV BLD AUTO: 9.2 FL (ref 9.2–11.8)
RBC # BLD AUTO: 3.02 M/UL (ref 4.35–5.65)
WBC # BLD AUTO: 10.1 K/UL (ref 4.6–13.2)

## 2022-03-05 PROCEDURE — 85730 THROMBOPLASTIN TIME PARTIAL: CPT

## 2022-03-05 PROCEDURE — 36415 COLL VENOUS BLD VENIPUNCTURE: CPT

## 2022-03-05 PROCEDURE — 99232 SBSQ HOSP IP/OBS MODERATE 35: CPT | Performed by: INTERNAL MEDICINE

## 2022-03-05 PROCEDURE — 65660000004 HC RM CVT STEPDOWN

## 2022-03-05 PROCEDURE — 85025 COMPLETE CBC W/AUTO DIFF WBC: CPT

## 2022-03-05 PROCEDURE — 74011250636 HC RX REV CODE- 250/636: Performed by: SURGERY

## 2022-03-05 PROCEDURE — 74011250637 HC RX REV CODE- 250/637: Performed by: PHYSICIAN ASSISTANT

## 2022-03-05 PROCEDURE — 74011250637 HC RX REV CODE- 250/637: Performed by: SURGERY

## 2022-03-05 PROCEDURE — 74011000250 HC RX REV CODE- 250: Performed by: SURGERY

## 2022-03-05 PROCEDURE — 2709999900 HC NON-CHARGEABLE SUPPLY

## 2022-03-05 RX ADMIN — DILTIAZEM HYDROCHLORIDE 120 MG: 120 CAPSULE, COATED, EXTENDED RELEASE ORAL at 17:03

## 2022-03-05 RX ADMIN — OXYCODONE AND ACETAMINOPHEN 2 TABLET: 5; 325 TABLET ORAL at 16:01

## 2022-03-05 RX ADMIN — OXYCODONE AND ACETAMINOPHEN 2 TABLET: 5; 325 TABLET ORAL at 03:46

## 2022-03-05 RX ADMIN — GABAPENTIN 400 MG: 400 CAPSULE ORAL at 08:21

## 2022-03-05 RX ADMIN — OXYCODONE AND ACETAMINOPHEN 2 TABLET: 5; 325 TABLET ORAL at 20:09

## 2022-03-05 RX ADMIN — DILTIAZEM HYDROCHLORIDE 180 MG: 180 CAPSULE, COATED, EXTENDED RELEASE ORAL at 08:46

## 2022-03-05 RX ADMIN — HEPARIN SODIUM 20 UNITS/KG/HR: 1000 INJECTION INTRAVENOUS; SUBCUTANEOUS at 13:09

## 2022-03-05 RX ADMIN — METOPROLOL TARTRATE 50 MG: 50 TABLET, FILM COATED ORAL at 17:03

## 2022-03-05 RX ADMIN — OXYCODONE AND ACETAMINOPHEN 2 TABLET: 5; 325 TABLET ORAL at 12:37

## 2022-03-05 RX ADMIN — DOCUSATE SODIUM 100 MG: 100 CAPSULE, LIQUID FILLED ORAL at 17:03

## 2022-03-05 RX ADMIN — GABAPENTIN 400 MG: 400 CAPSULE ORAL at 15:32

## 2022-03-05 RX ADMIN — SODIUM CHLORIDE, PRESERVATIVE FREE 10 ML: 5 INJECTION INTRAVENOUS at 06:16

## 2022-03-05 RX ADMIN — SODIUM CHLORIDE, PRESERVATIVE FREE 10 ML: 5 INJECTION INTRAVENOUS at 21:53

## 2022-03-05 RX ADMIN — METOPROLOL TARTRATE 50 MG: 50 TABLET, FILM COATED ORAL at 08:21

## 2022-03-05 RX ADMIN — DOCUSATE SODIUM 100 MG: 100 CAPSULE, LIQUID FILLED ORAL at 08:21

## 2022-03-05 RX ADMIN — ATORVASTATIN CALCIUM 80 MG: 40 TABLET, FILM COATED ORAL at 21:53

## 2022-03-05 RX ADMIN — GABAPENTIN 400 MG: 400 CAPSULE ORAL at 21:53

## 2022-03-05 RX ADMIN — CETIRIZINE HYDROCHLORIDE 10 MG: 10 TABLET, FILM COATED ORAL at 08:21

## 2022-03-05 RX ADMIN — HYDROMORPHONE HYDROCHLORIDE 1 MG: 1 INJECTION, SOLUTION INTRAMUSCULAR; INTRAVENOUS; SUBCUTANEOUS at 00:54

## 2022-03-05 RX ADMIN — OXYCODONE AND ACETAMINOPHEN 2 TABLET: 5; 325 TABLET ORAL at 07:55

## 2022-03-05 RX ADMIN — ASPIRIN 81 MG 81 MG: 81 TABLET ORAL at 08:21

## 2022-03-05 NOTE — ANESTHESIA POSTPROCEDURE EVALUATION
Procedure(s):  AMPUTATION RIGHT FOURTH AND FIFTH TOES PARTIAL METATARSAL AMPUTATION. MAC    Anesthesia Post Evaluation      Multimodal analgesia: multimodal analgesia used between 6 hours prior to anesthesia start to PACU discharge  Patient location during evaluation: bedside  Patient participation: complete - patient participated  Level of consciousness: awake  Pain management: adequate  Airway patency: patent  Anesthetic complications: no  Cardiovascular status: stable  Respiratory status: acceptable  Hydration status: acceptable  Post anesthesia nausea and vomiting:  controlled      INITIAL Post-op Vital signs:   Vitals Value Taken Time   /70 03/04/22 1754   Temp 37.4 °C (99.4 °F) 03/04/22 1655   Pulse 97 03/04/22 1801   Resp 22 03/04/22 1801   SpO2 96 % 03/04/22 1801   Vitals shown include unvalidated device data.

## 2022-03-05 NOTE — PROGRESS NOTES
Problem: Pressure Injury - Risk of  Goal: *Prevention of pressure injury  Description: Document Efrem Scale and appropriate interventions in the flowsheet. Outcome: Progressing Towards Goal  Note: Pressure Injury Interventions:  Sensory Interventions: Assess changes in LOC,Check visual cues for pain,Float heels,Keep linens dry and wrinkle-free,Minimize linen layers,Pressure redistribution bed/mattress (bed type),Use 30-degree side-lying position    Moisture Interventions: Absorbent underpads,Minimize layers    Activity Interventions: Pressure redistribution bed/mattress(bed type)    Mobility Interventions: Float heels,HOB 30 degrees or less,Pressure redistribution bed/mattress (bed type)    Nutrition Interventions: Document food/fluid/supplement intake    Friction and Shear Interventions: HOB 30 degrees or less,Minimize layers                Problem: Falls - Risk of  Goal: *Absence of Falls  Description: Document Antonio Fall Risk and appropriate interventions in the flowsheet.   Outcome: Progressing Towards Goal  Note: Fall Risk Interventions:  Mobility Interventions: Communicate number of staff needed for ambulation/transfer,Patient to call before getting OOB         Medication Interventions: Bed/chair exit alarm,Patient to call before getting OOB    Elimination Interventions: Bed/chair exit alarm,Call light in reach,Patient to call for help with toileting needs,Urinal in reach    History of Falls Interventions: Bed/chair exit alarm,Door open when patient unattended,Room close to nurse's station         Problem: Pain  Goal: *Control of Pain  Outcome: Progressing Towards Goal     Problem: Patient Education: Go to Patient Education Activity  Goal: Patient/Family Education  Outcome: Progressing Towards Goal

## 2022-03-05 NOTE — PROGRESS NOTES
Pt arrived to floor 1815 from PACU. Admitted to 2307, assessed, MD EARL notified and ordered placed.

## 2022-03-05 NOTE — ROUTINE PROCESS
1911:Bedside and Verbal shift change report received from Jerry Ware (offgoing nurse). Report included the following information SBAR, Kardex, Intake/Output, MAR, Recent Results and Cardiac Rhythm Afib. Quietly resting in bed. HOB elevated. No SOB on RA. Rt foot dressing CDI. Rt groin wound vac on.     2023: Medicated for pain per patient request. Gabapentin given early per patient request. The 1600 dose was missed. 2119: Due med given. 2300: No change from previous assessment. 0116: Medicated for pain per patient request.    7376: No change from previous assessment. 7132: Medicated for pain per patient request.     0600:Slept on & off thru night. Needs attended. 3198: Bedside and Verbal shift change report given to Lynn Daniels RN RN (oncoming nurse) by me (offgoing nurse). Report included the following information SBAR, Kardex, Intake/Output, MAR, Recent Results and Cardiac Rhythm Afib.

## 2022-03-05 NOTE — PROGRESS NOTES
Cardiology Progress Note    Admit Date: 2/28/2022  Attending Cardiologist: Dr. Griselda Rodriguez:     -Occlusion of right femoral-popliteal bypass graft now s/p right axillofemoral graft bypass to right anterior tibial bypass with cadaver vein 2/28/22. -CAF: Patient previously had paroxysmal atrial fibrillation s/p remote cardioversion now appears persistent. With RVR this admission. On CCB, BB and Xarelto as outpatient.  -Hypokalemia. Continue replacement as needed. -PAD s/p previous right fempop bypass and previous left AKA. -CAD s/p Cx and RCA PCI 2017.  -H/o Jamaal Riggers with improved EF 55-60% 4/2021.  -Hypertension. Soft. -Hyperlipidemia. On statin.     Primary cardiologist Dr. Lissy Ludwig.     Echo 4/2021  · LV: Estimated LVEF is 55 - 60%. Visually measured ejection fraction. Normal cavity size, wall thickness and systolic function (ejection fraction normal). Wall motion: normal. Age-appropriate left ventricular diastolic function. · AV: Probably trileaflet aortic valve. · RV: Not well visualized. · Pericardium: Pericardial fat pad present.          Plan:     Patient's heart rate is controlled this morning. Continue present medications  Plan to restart Xarelto once no further procedures planned  Losartan and Lasix are still on hold and will need to be reevaluated prior to discharge depending on his blood pressure. Subjective:     No CP. No SOB.   Denies any palpitation    Objective:      Patient Vitals for the past 8 hrs:   Temp Pulse Resp BP SpO2   03/05/22 1051 98.3 °F (36.8 °C) 89 20 123/73 99 %   03/05/22 0821 -- (!) 126 -- (!) 139/96 --   03/05/22 0745 -- -- -- -- 94 %   03/05/22 0730 98.5 °F (36.9 °C) (!) 120 20 132/79 91 %   03/05/22 0700 -- (!) 109 20 132/79 95 %   03/05/22 0600 -- 86 19 133/81 96 %   03/05/22 0500 -- (!) 107 17 121/85 96 %         Patient Vitals for the past 96 hrs:   Weight   03/04/22 1522 80.3 kg (177 lb)   03/03/22 1055 80.3 kg (177 lb)       TELE: THOMAS               Current Facility-Administered Medications   Medication Dose Route Frequency Last Admin    heparin 25,000 units in  ml infusion  18-36 Units/kg/hr IntraVENous TITRATE 20 Units/kg/hr at 03/05/22 0735    polyethylene glycol (MIRALAX) packet 17 g  17 g Oral BID 17 g at 03/03/22 7317    docusate sodium (COLACE) capsule 100 mg  100 mg Oral  mg at 03/05/22 6424    cetirizine (ZYRTEC) tablet 10 mg  10 mg Oral DAILY 10 mg at 03/05/22 0529    dilTIAZem ER (CARDIZEM CD) capsule 120 mg  120 mg Oral  mg at 03/04/22 1847    dilTIAZem ER (CARDIZEM CD) capsule 180 mg  180 mg Oral  mg at 03/05/22 0846    diphenhydrAMINE (BENADRYL) capsule 25 mg  25 mg Oral Q6H PRN 25 mg at 03/03/22 1439    ELECTROLYTE REPLACEMENT PROTOCOL - Potassium Standard Dosing   1 Each Other PRN      ELECTROLYTE REPLACEMENT PROTOCOL - Magnesium   1 Each Other PRN      sodium chloride (NS) flush 10 mL  10 mL IntraVENous Q8H 10 mL at 03/05/22 0616    oxyCODONE-acetaminophen (PERCOCET) 5-325 mg per tablet 2 Tablet  2 Tablet Oral Q4H PRN 2 Tablet at 03/05/22 0755    aspirin chewable tablet 81 mg  81 mg Oral DAILY 81 mg at 03/05/22 4209    atorvastatin (LIPITOR) tablet 80 mg  80 mg Oral QHS 80 mg at 03/04/22 2117    [Held by provider] furosemide (LASIX) tablet 20 mg  20 mg Oral DAILY 20 mg at 03/01/22 6171    gabapentin (NEURONTIN) capsule 400 mg  400 mg Oral  mg at 03/05/22 9264    [Held by provider] losartan (COZAAR) tablet 100 mg  100 mg Oral DAILY 100 mg at 03/01/22 0829    metoprolol tartrate (LOPRESSOR) tablet 50 mg  50 mg Oral BID 50 mg at 03/05/22 2354    oxyCODONE-acetaminophen (PERCOCET) 5-325 mg per tablet 1 Tablet  1 Tablet Oral Q4H PRN 1 Tablet at 03/01/22 1732    HYDROmorphone (DILAUDID) syringe 0.5-1 mg  0.5-1 mg IntraVENous Q2H PRN 1 mg at 03/05/22 0054    naloxone (NARCAN) injection 0.4 mg  0.4 mg IntraVENous PRN      metoprolol (LOPRESSOR) injection 5 mg  5 mg IntraVENous Q6H PRN 5 mg at 03/01/22 8877 Intake/Output Summary (Last 24 hours) at 3/5/2022 1218  Last data filed at 3/5/2022 0856  Gross per 24 hour   Intake 1006.92 ml   Output 1900 ml   Net -893.08 ml       Physical Exam:  General:  alert, cooperative, no distress, appears stated age  Neck:  no JVD  Lungs:  clear to auscultation bilaterally doubt any significant rales  Heart:  irregularly irregular rhythm, tachycardic  Abdomen:  abdomen is soft without any significant tenderness  Extremities:  trace edema, L AKA    Visit Vitals  /73 (BP 1 Location: Right upper arm, BP Patient Position: Semi fowlers)   Pulse 89   Temp 98.3 °F (36.8 °C)   Resp 20   Ht 5' 10\" (1.778 m)   Wt 80.3 kg (177 lb)   SpO2 99%   BMI 25.40 kg/m²       Data Review:     Labs: Results:       Chemistry Recent Labs     03/04/22  0550 03/02/22  1745   GLU 93  --      --    K 3.6 4.0     --    CO2 25  --    BUN 7  --    CREA 0.75  --    CA 9.0  --    AGAP 5  --    BUCR 9*  --       CBC w/Diff Recent Labs     03/05/22  0437 03/04/22  0550   WBC 10.1 10.2   RBC 3.02* 3.05*   HGB 8.8* 8.9*   HCT 26.7* 27.1*    394   GRANS 72 73   LYMPH 17* 16*   EOS 1 1      Cardiac Enzymes No results found for: CPK, CK, CKMMB, CKMB, RCK3, CKMBT, CKNDX, CKND1, LEO, TROPT, TROIQ, DEWAYNE, TROPT, TNIPOC, BNP, BNPP   Coagulation Recent Labs     03/05/22  0437 03/04/22  0550   APTT 69.9* 39.3*       Lipid Panel Lab Results   Component Value Date/Time    Cholesterol, total 83 12/15/2021 07:08 AM    HDL Cholesterol 22 (L) 12/15/2021 07:08 AM    LDL, calculated 43.2 12/15/2021 07:08 AM    VLDL, calculated 17.8 12/15/2021 07:08 AM    Triglyceride 89 12/15/2021 07:08 AM    CHOL/HDL Ratio 3.8 12/15/2021 07:08 AM      BNP No results found for: BNP, BNPP, XBNPT   Liver Enzymes No results for input(s): TP, ALB, TBIL, AP in the last 72 hours.     No lab exists for component: SGOT, GPT, DBIL   Thyroid Studies Lab Results   Component Value Date/Time    T4, Total 9.0 11/09/2017 10:28 AM    TSH 2.08 04/08/2021 07:05 AM          Signed By: Carole Ling MD     March 5, 2022

## 2022-03-05 NOTE — OP NOTES
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 PROCEDURE PERFORMED:  Amputation of fourth and fifth toes and fourth and fifth metatarsal heads, right foot, with partial flap closure. SURGEON:  Yogi Villareal DPM.    ASSISTANT:  student. ANESTHESIA:  MAC.    COMPLICATIONS:  none. SPECIMENS REMOVED:  bone. IMPLANTS:  none. ESTIMATED BLOOD LOSS:  0.    DESCRIPTION OF PROCEDURE:  On 2022, the patient was placed on the operating room table in the supine position. After adequate induction of MAC anesthesia, right lower extremity was prepped and draped in the usual sterile fashion. Attention was directed to fourth and fifth toes. I debrided away some devitalized tissue. Base of the toes was not necrotic, but two-thirds of the toes were necrotic. No purulence noted. Two semi-elliptical incisions were accomplished around the base of the toes with care being taken to preserve all vascular structures. There was bleeding noted, although moderately delayed. Careful dissection along the phalanges was accomplished, and then, I disarticulated the fourth and fifth toes. I then dissected more proximally and resected fourth and fifth metatarsal heads  for easy flap closure. Wounds were thoroughly irrigated with antibiotic solution. Small blood vessels were bovied as necessary. I was able to close two-thirds of the wound loosely with Prolene suture and then an iodoform distal packing. A soft compressive dressing was applied. The patient tolerated the procedure and anesthesia well with vital signs stable throughout.   The patient was transported to the recovery room in stable condition.       Dolly Newsome DPM      [id number]  D:  03/04/2022 16:53  T:  03/05/2022 12:35  JOB #:  [id number]  CC:  Denae Lee DPM

## 2022-03-06 LAB
APTT PPP: 82.3 SEC (ref 23–36.4)
BASOPHILS # BLD: 0 K/UL (ref 0–0.1)
BASOPHILS NFR BLD: 1 % (ref 0–2)
DIFFERENTIAL METHOD BLD: ABNORMAL
EOSINOPHIL # BLD: 0.1 K/UL (ref 0–0.4)
EOSINOPHIL NFR BLD: 2 % (ref 0–5)
ERYTHROCYTE [DISTWIDTH] IN BLOOD BY AUTOMATED COUNT: 16.5 % (ref 11.6–14.5)
HCT VFR BLD AUTO: 27.8 % (ref 36–48)
HGB BLD-MCNC: 9 G/DL (ref 13–16)
IMM GRANULOCYTES # BLD AUTO: 0 K/UL (ref 0–0.04)
IMM GRANULOCYTES NFR BLD AUTO: 0 % (ref 0–0.5)
LYMPHOCYTES # BLD: 2.1 K/UL (ref 0.9–3.6)
LYMPHOCYTES NFR BLD: 26 % (ref 21–52)
MCH RBC QN AUTO: 28.9 PG (ref 24–34)
MCHC RBC AUTO-ENTMCNC: 32.4 G/DL (ref 31–37)
MCV RBC AUTO: 89.4 FL (ref 78–100)
MONOCYTES # BLD: 0.9 K/UL (ref 0.05–1.2)
MONOCYTES NFR BLD: 12 % (ref 3–10)
NEUTS SEG # BLD: 4.7 K/UL (ref 1.8–8)
NEUTS SEG NFR BLD: 60 % (ref 40–73)
NRBC # BLD: 0 K/UL (ref 0–0.01)
NRBC BLD-RTO: 0 PER 100 WBC
PLATELET # BLD AUTO: 402 K/UL (ref 135–420)
PMV BLD AUTO: 9.3 FL (ref 9.2–11.8)
RBC # BLD AUTO: 3.11 M/UL (ref 4.35–5.65)
WBC # BLD AUTO: 7.9 K/UL (ref 4.6–13.2)

## 2022-03-06 PROCEDURE — 74011250637 HC RX REV CODE- 250/637: Performed by: SURGERY

## 2022-03-06 PROCEDURE — 74011250637 HC RX REV CODE- 250/637: Performed by: PHYSICIAN ASSISTANT

## 2022-03-06 PROCEDURE — 85025 COMPLETE CBC W/AUTO DIFF WBC: CPT

## 2022-03-06 PROCEDURE — 65660000004 HC RM CVT STEPDOWN

## 2022-03-06 PROCEDURE — 36415 COLL VENOUS BLD VENIPUNCTURE: CPT

## 2022-03-06 PROCEDURE — 74011000250 HC RX REV CODE- 250: Performed by: SURGERY

## 2022-03-06 PROCEDURE — 85730 THROMBOPLASTIN TIME PARTIAL: CPT

## 2022-03-06 PROCEDURE — 99232 SBSQ HOSP IP/OBS MODERATE 35: CPT | Performed by: INTERNAL MEDICINE

## 2022-03-06 PROCEDURE — 74011250636 HC RX REV CODE- 250/636: Performed by: SURGERY

## 2022-03-06 PROCEDURE — 2709999900 HC NON-CHARGEABLE SUPPLY

## 2022-03-06 RX ORDER — DILTIAZEM HYDROCHLORIDE 120 MG/1
120 CAPSULE, COATED, EXTENDED RELEASE ORAL ONCE
Status: DISCONTINUED | OUTPATIENT
Start: 2022-03-06 | End: 2022-03-06 | Stop reason: SDUPTHER

## 2022-03-06 RX ORDER — DILTIAZEM HYDROCHLORIDE 240 MG/1
240 CAPSULE, COATED, EXTENDED RELEASE ORAL DAILY
Status: DISCONTINUED | OUTPATIENT
Start: 2022-03-07 | End: 2022-03-08

## 2022-03-06 RX ORDER — DILTIAZEM HYDROCHLORIDE 120 MG/1
120 CAPSULE, COATED, EXTENDED RELEASE ORAL ONCE
Status: ACTIVE | OUTPATIENT
Start: 2022-03-06 | End: 2022-03-07

## 2022-03-06 RX ADMIN — DOCUSATE SODIUM 100 MG: 100 CAPSULE, LIQUID FILLED ORAL at 08:30

## 2022-03-06 RX ADMIN — OXYCODONE AND ACETAMINOPHEN 2 TABLET: 5; 325 TABLET ORAL at 17:07

## 2022-03-06 RX ADMIN — GABAPENTIN 400 MG: 400 CAPSULE ORAL at 17:09

## 2022-03-06 RX ADMIN — HYDROMORPHONE HYDROCHLORIDE 1 MG: 1 INJECTION, SOLUTION INTRAMUSCULAR; INTRAVENOUS; SUBCUTANEOUS at 12:04

## 2022-03-06 RX ADMIN — OXYCODONE AND ACETAMINOPHEN 2 TABLET: 5; 325 TABLET ORAL at 23:38

## 2022-03-06 RX ADMIN — DOCUSATE SODIUM 100 MG: 100 CAPSULE, LIQUID FILLED ORAL at 17:09

## 2022-03-06 RX ADMIN — ASPIRIN 81 MG 81 MG: 81 TABLET ORAL at 08:30

## 2022-03-06 RX ADMIN — POLYETHYLENE GLYCOL 3350 17 G: 17 POWDER, FOR SOLUTION ORAL at 21:46

## 2022-03-06 RX ADMIN — OXYCODONE AND ACETAMINOPHEN 2 TABLET: 5; 325 TABLET ORAL at 08:30

## 2022-03-06 RX ADMIN — SODIUM CHLORIDE, PRESERVATIVE FREE 10 ML: 5 INJECTION INTRAVENOUS at 21:46

## 2022-03-06 RX ADMIN — HEPARIN SODIUM 20 UNITS/KG/HR: 1000 INJECTION INTRAVENOUS; SUBCUTANEOUS at 07:19

## 2022-03-06 RX ADMIN — METOPROLOL TARTRATE 50 MG: 50 TABLET, FILM COATED ORAL at 08:30

## 2022-03-06 RX ADMIN — POLYETHYLENE GLYCOL 3350 17 G: 17 POWDER, FOR SOLUTION ORAL at 08:31

## 2022-03-06 RX ADMIN — CETIRIZINE HYDROCHLORIDE 10 MG: 10 TABLET, FILM COATED ORAL at 08:31

## 2022-03-06 RX ADMIN — DILTIAZEM HYDROCHLORIDE 180 MG: 180 CAPSULE, COATED, EXTENDED RELEASE ORAL at 08:31

## 2022-03-06 RX ADMIN — GABAPENTIN 400 MG: 400 CAPSULE ORAL at 08:30

## 2022-03-06 RX ADMIN — SODIUM CHLORIDE, PRESERVATIVE FREE 10 ML: 5 INJECTION INTRAVENOUS at 05:36

## 2022-03-06 RX ADMIN — ATORVASTATIN CALCIUM 80 MG: 40 TABLET, FILM COATED ORAL at 21:46

## 2022-03-06 RX ADMIN — GABAPENTIN 400 MG: 400 CAPSULE ORAL at 21:46

## 2022-03-06 RX ADMIN — APIXABAN 5 MG: 5 TABLET, FILM COATED ORAL at 17:09

## 2022-03-06 RX ADMIN — METOPROLOL TARTRATE 50 MG: 50 TABLET, FILM COATED ORAL at 17:09

## 2022-03-06 RX ADMIN — SODIUM CHLORIDE, PRESERVATIVE FREE 10 ML: 5 INJECTION INTRAVENOUS at 17:10

## 2022-03-06 NOTE — PROGRESS NOTES
Seen at bedside awake and alert. Dressing clean and intact. Inspected wound.   Viable no necrosis or pus  Stable after surgery

## 2022-03-06 NOTE — ROUTINE PROCESS
1901:Bedside and Verbal shift change report received from [address] [facility] (offgoing nurse). Report included the following information SBAR, Kardex, Intake/Output, MAR, Recent Results and Cardiac Rhythm Afib. Quietly resting in bed. No SOB on RA. On Heparin gtt at 20 units/kg/hr or 16.1 ml/hr. Infusing well to Rt AC. Call light within reach. 2009: Medicated for pain per patient request.    2153: Due meds given. 2300: No change from previous assessment. 0400:No change from previous assessment. 5777: Alept good thru night. Needs attended. APTT 82.3. Therapeutic range. Next APTT due tomorrow AM    0730: Bedside and Verbal shift change report given to [place] (oncoming nurse) by me (offgoing nurse). Report included the following information SBAR, Kardex, Intake/Output, MAR, Recent Results and Cardiac Rhythm Afib.

## 2022-03-06 NOTE — PROGRESS NOTES
Vital signs are stable  Stable anticoagulation  We will transition to Eliquis and aspirin  We will discuss with Dr. Asher Steward regarding weightbearing status

## 2022-03-06 NOTE — PROGRESS NOTES
Bedside and Verbal shift change report given to Peng (oncoming nurse) by Claire Merrill (offgoing nurse). Report included the following information SBAR, Kardex and MAR.

## 2022-03-06 NOTE — PROGRESS NOTES
Cardiology Progress Note    Admit Date: 2/28/2022  Attending Cardiologist: Dr. Lizy Noel:     -Occlusion of right femoral-popliteal bypass graft now s/p right axillofemoral graft bypass to right anterior tibial bypass with cadaver vein 2/28/22. -CAF: Patient previously had paroxysmal atrial fibrillation s/p remote cardioversion now appears persistent. With RVR this admission. On CCB, BB and Xarelto as outpatient.  -Hypokalemia. Continue replacement as needed. -PAD s/p previous right fempop bypass and previous left AKA. -CAD s/p Cx and RCA PCI 2017.  -H/o Merrick Taty with improved EF 55-60% 4/2021.  -Hypertension. Soft. -Hyperlipidemia. On statin.     Primary cardiologist Dr. Gena Narayanan.     Echo 4/2021  · LV: Estimated LVEF is 55 - 60%. Visually measured ejection fraction. Normal cavity size, wall thickness and systolic function (ejection fraction normal). Wall motion: normal. Age-appropriate left ventricular diastolic function. · AV: Probably trileaflet aortic valve. · RV: Not well visualized. · Pericardium: Pericardial fat pad present.          Plan:     Patient's heart rate is not controlled this morning. Increase cardizem per orders. Continue other medications  Plan to restart Xarelto once no further procedures planned  Losartan and Lasix are still on hold and will need to be reevaluated prior to discharge depending on his blood pressure. Subjective:     No CP. No SOB.   Denies any palpitation    Objective:      Patient Vitals for the past 8 hrs:   Temp Pulse Resp BP SpO2   03/06/22 0735 98.7 °F (37.1 °C) (!) 109 22 137/70 99 %   03/06/22 0400 98.7 °F (37.1 °C) 83 14 127/70 99 %   03/06/22 0300 -- 77 (!) 32 130/89 93 %         Patient Vitals for the past 96 hrs:   Weight   03/06/22 0400 84.8 kg (187 lb)   03/04/22 1522 80.3 kg (177 lb)   03/03/22 1055 80.3 kg (177 lb)       TELE: AFIB               Current Facility-Administered Medications   Medication Dose Route Frequency Last Admin    [START ON 3/7/2022] dilTIAZem ER (CARDIZEM CD) capsule 240 mg  240 mg Oral DAILY      dilTIAZem ER (CARDIZEM CD) capsule 120 mg  120 mg Oral ONCE      heparin 25,000 units in  ml infusion  18-36 Units/kg/hr IntraVENous TITRATE 20 Units/kg/hr at 03/06/22 0719    polyethylene glycol (MIRALAX) packet 17 g  17 g Oral BID 17 g at 03/06/22 0831    docusate sodium (COLACE) capsule 100 mg  100 mg Oral  mg at 03/06/22 0830    cetirizine (ZYRTEC) tablet 10 mg  10 mg Oral DAILY 10 mg at 03/06/22 0831    diphenhydrAMINE (BENADRYL) capsule 25 mg  25 mg Oral Q6H PRN 25 mg at 03/03/22 1439    ELECTROLYTE REPLACEMENT PROTOCOL - Potassium Standard Dosing   1 Each Other PRN      ELECTROLYTE REPLACEMENT PROTOCOL - Magnesium   1 Each Other PRN      sodium chloride (NS) flush 10 mL  10 mL IntraVENous Q8H 10 mL at 03/06/22 0536    oxyCODONE-acetaminophen (PERCOCET) 5-325 mg per tablet 2 Tablet  2 Tablet Oral Q4H PRN 2 Tablet at 03/06/22 0830    aspirin chewable tablet 81 mg  81 mg Oral DAILY 81 mg at 03/06/22 0830    atorvastatin (LIPITOR) tablet 80 mg  80 mg Oral QHS 80 mg at 03/05/22 2153    [Held by provider] furosemide (LASIX) tablet 20 mg  20 mg Oral DAILY 20 mg at 03/01/22 3702    gabapentin (NEURONTIN) capsule 400 mg  400 mg Oral  mg at 03/06/22 0830    [Held by provider] losartan (COZAAR) tablet 100 mg  100 mg Oral DAILY 100 mg at 03/01/22 0829    metoprolol tartrate (LOPRESSOR) tablet 50 mg  50 mg Oral BID 50 mg at 03/06/22 0830    oxyCODONE-acetaminophen (PERCOCET) 5-325 mg per tablet 1 Tablet  1 Tablet Oral Q4H PRN 1 Tablet at 03/01/22 1732    HYDROmorphone (DILAUDID) syringe 0.5-1 mg  0.5-1 mg IntraVENous Q2H PRN 1 mg at 03/05/22 0054    naloxone (NARCAN) injection 0.4 mg  0.4 mg IntraVENous PRN      metoprolol (LOPRESSOR) injection 5 mg  5 mg IntraVENous Q6H PRN 5 mg at 03/01/22 0731         Intake/Output Summary (Last 24 hours) at 3/6/2022 1027  Last data filed at 3/6/2022 0659  Gross per 24 hour   Intake 866.4 ml   Output 1350 ml   Net -483.6 ml       Physical Exam:  General:  alert, cooperative, no distress, appears stated age  Neck:  no JVD  Lungs:  clear to auscultation bilaterally doubt any significant rales  Heart:  irregularly irregular rhythm, tachycardic  Abdomen:  abdomen is soft without any significant tenderness  Extremities:  trace edema, L AKA    Visit Vitals  /70 (BP 1 Location: Right upper arm, BP Patient Position: At rest)   Pulse (!) 109   Temp 98.7 °F (37.1 °C)   Resp 22   Ht 5' 10\" (1.778 m)   Wt 84.8 kg (187 lb)   SpO2 99%   BMI 26.83 kg/m²       Data Review:     Labs: Results:       Chemistry Recent Labs     03/04/22  0550   GLU 93      K 3.6      CO2 25   BUN 7   CREA 0.75   CA 9.0   AGAP 5   BUCR 9*      CBC w/Diff Recent Labs     03/06/22 0428 03/05/22  0437 03/04/22  0550   WBC 7.9 10.1 10.2   RBC 3.11* 3.02* 3.05*   HGB 9.0* 8.8* 8.9*   HCT 27.8* 26.7* 27.1*    395 394   GRANS 60 72 73   LYMPH 26 17* 16*   EOS 2 1 1      Cardiac Enzymes No results found for: CPK, CK, CKMMB, CKMB, RCK3, CKMBT, CKNDX, CKND1, LEO, TROPT, TROIQ, DEWAYNE, TROPT, TNIPOC, BNP, BNPP   Coagulation Recent Labs     03/06/22 0428 03/05/22  0437   APTT 82.3* 69.9*       Lipid Panel Lab Results   Component Value Date/Time    Cholesterol, total 83 12/15/2021 07:08 AM    HDL Cholesterol 22 (L) 12/15/2021 07:08 AM    LDL, calculated 43.2 12/15/2021 07:08 AM    VLDL, calculated 17.8 12/15/2021 07:08 AM    Triglyceride 89 12/15/2021 07:08 AM    CHOL/HDL Ratio 3.8 12/15/2021 07:08 AM      BNP No results found for: BNP, BNPP, XBNPT   Liver Enzymes No results for input(s): TP, ALB, TBIL, AP in the last 72 hours.     No lab exists for component: SGOT, GPT, DBIL   Thyroid Studies Lab Results   Component Value Date/Time    T4, Total 9.0 11/09/2017 10:28 AM    TSH 2.08 04/08/2021 07:05 AM          Signed By: Patrick Burger MD     March 6, 2022

## 2022-03-07 LAB
APTT PPP: 43.2 SEC (ref 23–36.4)
BASOPHILS # BLD: 0 K/UL (ref 0–0.1)
BASOPHILS NFR BLD: 0 % (ref 0–2)
DIFFERENTIAL METHOD BLD: ABNORMAL
EOSINOPHIL # BLD: 0.2 K/UL (ref 0–0.4)
EOSINOPHIL NFR BLD: 2 % (ref 0–5)
ERYTHROCYTE [DISTWIDTH] IN BLOOD BY AUTOMATED COUNT: 16.3 % (ref 11.6–14.5)
HCT VFR BLD AUTO: 27.6 % (ref 36–48)
HGB BLD-MCNC: 9.2 G/DL (ref 13–16)
IMM GRANULOCYTES # BLD AUTO: 0.1 K/UL (ref 0–0.04)
IMM GRANULOCYTES NFR BLD AUTO: 1 % (ref 0–0.5)
LYMPHOCYTES # BLD: 1.9 K/UL (ref 0.9–3.6)
LYMPHOCYTES NFR BLD: 21 % (ref 21–52)
MCH RBC QN AUTO: 29.6 PG (ref 24–34)
MCHC RBC AUTO-ENTMCNC: 33.3 G/DL (ref 31–37)
MCV RBC AUTO: 88.7 FL (ref 78–100)
MONOCYTES # BLD: 1 K/UL (ref 0.05–1.2)
MONOCYTES NFR BLD: 11 % (ref 3–10)
NEUTS SEG # BLD: 5.9 K/UL (ref 1.8–8)
NEUTS SEG NFR BLD: 65 % (ref 40–73)
NRBC # BLD: 0 K/UL (ref 0–0.01)
NRBC BLD-RTO: 0 PER 100 WBC
PLATELET # BLD AUTO: 455 K/UL (ref 135–420)
PMV BLD AUTO: 9.1 FL (ref 9.2–11.8)
RBC # BLD AUTO: 3.11 M/UL (ref 4.35–5.65)
WBC # BLD AUTO: 9.1 K/UL (ref 4.6–13.2)

## 2022-03-07 PROCEDURE — 85025 COMPLETE CBC W/AUTO DIFF WBC: CPT

## 2022-03-07 PROCEDURE — 85730 THROMBOPLASTIN TIME PARTIAL: CPT

## 2022-03-07 PROCEDURE — 65660000004 HC RM CVT STEPDOWN

## 2022-03-07 PROCEDURE — 74011250636 HC RX REV CODE- 250/636: Performed by: SURGERY

## 2022-03-07 PROCEDURE — 99232 SBSQ HOSP IP/OBS MODERATE 35: CPT | Performed by: INTERNAL MEDICINE

## 2022-03-07 PROCEDURE — 74011250637 HC RX REV CODE- 250/637: Performed by: INTERNAL MEDICINE

## 2022-03-07 PROCEDURE — 74011000250 HC RX REV CODE- 250: Performed by: SURGERY

## 2022-03-07 PROCEDURE — 36415 COLL VENOUS BLD VENIPUNCTURE: CPT

## 2022-03-07 PROCEDURE — 74011250637 HC RX REV CODE- 250/637: Performed by: SURGERY

## 2022-03-07 RX ADMIN — METOPROLOL TARTRATE 50 MG: 50 TABLET, FILM COATED ORAL at 17:12

## 2022-03-07 RX ADMIN — APIXABAN 5 MG: 5 TABLET, FILM COATED ORAL at 08:20

## 2022-03-07 RX ADMIN — SODIUM CHLORIDE, PRESERVATIVE FREE 10 ML: 5 INJECTION INTRAVENOUS at 13:06

## 2022-03-07 RX ADMIN — DOCUSATE SODIUM 100 MG: 100 CAPSULE, LIQUID FILLED ORAL at 08:19

## 2022-03-07 RX ADMIN — GABAPENTIN 400 MG: 400 CAPSULE ORAL at 21:49

## 2022-03-07 RX ADMIN — CETIRIZINE HYDROCHLORIDE 10 MG: 10 TABLET, FILM COATED ORAL at 08:20

## 2022-03-07 RX ADMIN — ASPIRIN 81 MG 81 MG: 81 TABLET ORAL at 08:19

## 2022-03-07 RX ADMIN — OXYCODONE AND ACETAMINOPHEN 2 TABLET: 5; 325 TABLET ORAL at 14:52

## 2022-03-07 RX ADMIN — DILTIAZEM HYDROCHLORIDE 240 MG: 240 CAPSULE, COATED, EXTENDED RELEASE ORAL at 08:20

## 2022-03-07 RX ADMIN — POLYETHYLENE GLYCOL 3350 17 G: 17 POWDER, FOR SOLUTION ORAL at 08:20

## 2022-03-07 RX ADMIN — GABAPENTIN 400 MG: 400 CAPSULE ORAL at 08:19

## 2022-03-07 RX ADMIN — OXYCODONE AND ACETAMINOPHEN 1 TABLET: 5; 325 TABLET ORAL at 09:11

## 2022-03-07 RX ADMIN — APIXABAN 5 MG: 5 TABLET, FILM COATED ORAL at 17:12

## 2022-03-07 RX ADMIN — DOCUSATE SODIUM 100 MG: 100 CAPSULE, LIQUID FILLED ORAL at 17:12

## 2022-03-07 RX ADMIN — METOPROLOL TARTRATE 50 MG: 50 TABLET, FILM COATED ORAL at 08:19

## 2022-03-07 RX ADMIN — HYDROMORPHONE HYDROCHLORIDE 0.5 MG: 1 INJECTION, SOLUTION INTRAMUSCULAR; INTRAVENOUS; SUBCUTANEOUS at 17:20

## 2022-03-07 RX ADMIN — HYDROMORPHONE HYDROCHLORIDE 0.5 MG: 1 INJECTION, SOLUTION INTRAMUSCULAR; INTRAVENOUS; SUBCUTANEOUS at 08:22

## 2022-03-07 RX ADMIN — ATORVASTATIN CALCIUM 80 MG: 40 TABLET, FILM COATED ORAL at 21:49

## 2022-03-07 RX ADMIN — SODIUM CHLORIDE, PRESERVATIVE FREE 10 ML: 5 INJECTION INTRAVENOUS at 21:50

## 2022-03-07 RX ADMIN — GABAPENTIN 400 MG: 400 CAPSULE ORAL at 17:12

## 2022-03-07 NOTE — ROUTINE PROCESS
1903:Bedside and Verbal shift change report received from [place] (offgoing nurse). Report included the following information SBAR, Kardex, Intake/Output, MAR, Recent Results and Cardiac Rhythm Afib. Quietly resting in bed. C/o pain but is tolerable at this time. HOB elevated. No SOB on RA. Rt foot elevated on heel boot. Dressing not intact. Call light within reach. 2146: Due meds given. 2338: No change from previous assessment. Rt foot dressing changed. Medicated for pain per patient request.     0230: Comfortably sleeping.     0400: No change from previous assessment. 0600: Slept good thru night. Needs attended. 5197: Bedside and Verbal shift change report given to Lula Corona (oncoming nurse) by me (offgoing nurse). Report included the following information SBAR, Kardex, Intake/Output, MAR, Recent Results and Cardiac Rhythm Afib.

## 2022-03-07 NOTE — PROGRESS NOTES
Seen at bedside awake and alert. Inspected right foot. Superficial heel ulcer. Wound is without pus or necrosis. Good perfusion  Removed packing   DSD  Cleared for discharge  Follow up with his regular Podiatrist after discharge.

## 2022-03-07 NOTE — PROGRESS NOTES
Chart reviewed. Waiting for PT/OT eval after surgery to determine disposition.           CHRISTOPHER Layne RN  Care Management  Pager: [phone]

## 2022-03-07 NOTE — PROGRESS NOTES
Cardiology Progress Note    Admit Date: 2/28/2022  Attending Cardiologist: Dr. Zambrano Asp:     - Occlusion of right femoral-popliteal bypass graft now s/p right axillofemoral graft bypass to right anterior tibial bypass with cadaver vein 2/28/22.  - CAF: Patient previously had paroxysmal atrial fibrillation s/p remote cardioversion now appears persistent. With RVR this admission. On CCB, BB and Xarelto as outpatient. - Hypokalemia. Continue replacement as needed. - PAD s/p previous right fempop bypass and previous left AKA. - CAD s/p Cx and RCA PCI 2017.  - H/o Dorenda Chalino with improved EF 55-60% 4/2021.  - Hypertension. Soft. - Hyperlipidemia. On statin.     Echo 4/2021  · LV: Estimated LVEF is 55 - 60%. Visually measured ejection fraction. Normal cavity size, wall thickness and systolic function (ejection fraction normal). Wall motion: normal. Age-appropriate left ventricular diastolic function. · AV: Probably trileaflet aortic valve. · RV: Not well visualized. Pericardium: Pericardial fat pad present. Primary cardiologist Dr. Stephanie Suarez. Plan:     Addendum: Independently seen and evaluated. Agree with below. Would continue to reinstitute home cardiac medication regimen. Increase activity as tolerated. No new cardiac recommendations at this time. Hemodynamically stable. Lasix HELD given soft bps. Currently, there is no evidence of decompensated HF noted. Diltiazem recently increased for rate control. Continue beta-blocker, rates improving. Consider resuming losartan at a lower dose given history of soft blood pressures. Transitioned from xarelto to eliquis per vascular surgery. Continue eliquis for stroke prophylaxis. Further recommendations per Dr. Stephanie Suarez. Subjective:     No new complaints.      Objective:      Patient Vitals for the past 8 hrs:   Temp Pulse Resp BP SpO2   03/07/22 1059 99.2 °F (37.3 °C) 96 22 113/71 96 %   03/07/22 0743 97.5 °F (36.4 °C) 96 12 123/76 99 % 03/07/22 0600 -- 86 16 127/78 96 %   03/07/22 0500 -- 81 21 -- 96 %   03/07/22 0400 98.2 °F (36.8 °C) 85 11 129/65 96 %         Patient Vitals for the past 96 hrs:   Weight   03/07/22 0400 84.8 kg (187 lb)   03/06/22 0400 84.8 kg (187 lb)   03/04/22 1522 80.3 kg (177 lb)       TELE: AFIB    Current Facility-Administered Medications   Medication Dose Route Frequency Last Admin    dilTIAZem ER (CARDIZEM CD) capsule 240 mg  240 mg Oral DAILY 240 mg at 03/07/22 0820    apixaban (ELIQUIS) tablet 5 mg  5 mg Oral BID 5 mg at 03/07/22 0820    polyethylene glycol (MIRALAX) packet 17 g  17 g Oral BID 17 g at 03/07/22 0820    docusate sodium (COLACE) capsule 100 mg  100 mg Oral  mg at 03/07/22 0819    cetirizine (ZYRTEC) tablet 10 mg  10 mg Oral DAILY 10 mg at 03/07/22 0820    diphenhydrAMINE (BENADRYL) capsule 25 mg  25 mg Oral Q6H PRN 25 mg at 03/03/22 1439    ELECTROLYTE REPLACEMENT PROTOCOL - Potassium Standard Dosing   1 Each Other PRN      ELECTROLYTE REPLACEMENT PROTOCOL - Magnesium   1 Each Other PRN      sodium chloride (NS) flush 10 mL  10 mL IntraVENous Q8H 10 mL at 03/06/22 2146    oxyCODONE-acetaminophen (PERCOCET) 5-325 mg per tablet 2 Tablet  2 Tablet Oral Q4H PRN 2 Tablet at 03/06/22 2338    aspirin chewable tablet 81 mg  81 mg Oral DAILY 81 mg at 03/07/22 0819    atorvastatin (LIPITOR) tablet 80 mg  80 mg Oral QHS 80 mg at 03/06/22 2146    [Held by provider] furosemide (LASIX) tablet 20 mg  20 mg Oral DAILY 20 mg at 03/01/22 0831    gabapentin (NEURONTIN) capsule 400 mg  400 mg Oral  mg at 03/07/22 0819    [Held by provider] losartan (COZAAR) tablet 100 mg  100 mg Oral DAILY 100 mg at 03/01/22 0829    metoprolol tartrate (LOPRESSOR) tablet 50 mg  50 mg Oral BID 50 mg at 03/07/22 0819    oxyCODONE-acetaminophen (PERCOCET) 5-325 mg per tablet 1 Tablet  1 Tablet Oral Q4H PRN 1 Tablet at 03/07/22 0911    HYDROmorphone (DILAUDID) syringe 0.5-1 mg  0.5-1 mg IntraVENous Q2H PRN 0.5 mg at 03/07/22 5168    naloxone [facility]) injection 0.4 mg  0.4 mg IntraVENous PRN      metoprolol (LOPRESSOR) injection 5 mg  5 mg IntraVENous Q6H PRN 5 mg at 03/01/22 0731         Intake/Output Summary (Last 24 hours) at 3/7/2022 1100  Last data filed at 3/7/2022 0857  Gross per 24 hour   Intake 1192.7 ml   Output 1500 ml   Net -307.3 ml       Physical Exam:  General:  alert, cooperative, no distress, appears stated age  Neck:  no JVD  Lungs:  clear to auscultation bilaterally doubt any significant rales  Heart:  irregularly irregular rhythm, tachycardic  Abdomen:  abdomen is soft without any significant tenderness  Extremities:  trace edema, L AKA    Visit Vitals  /71 (BP 1 Location: Right upper arm, BP Patient Position: At rest)   Pulse 96   Temp 99.2 °F (37.3 °C)   Resp 22   Ht 5' 10\" (1.778 m)   Wt 84.8 kg (187 lb)   SpO2 96%   BMI 26.83 kg/m²       Data Review:     Labs: Results:       Chemistry No results for input(s): GLU, NA, K, CL, CO2, BUN, CREA, CA, MG, PHOS, AGAP, BUCR, TBIL, AP, TP, ALB, GLOB, AGRAT in the last 72 hours.     No lab exists for component: GPT   CBC w/Diff Recent Labs     03/07/22 0421 03/06/22 0428 03/05/22  0437   WBC 9.1 7.9 10.1   RBC 3.11* 3.11* 3.02*   HGB 9.2* 9.0* 8.8*   HCT 27.6* 27.8* 26.7*   * 402 395   GRANS 65 60 72   LYMPH 21 26 17*   EOS 2 2 1      Cardiac Enzymes No results found for: CPK, CK, CKMMB, CKMB, RCK3, CKMBT, CKNDX, CKND1, LEO, TROPT, TROIQ, DEWAYNE, TROPT, TNIPOC, BNP, BNPP   Coagulation Recent Labs     03/07/22 0421 03/06/22 0428   APTT 43.2* 82.3*       Lipid Panel Lab Results   Component Value Date/Time    Cholesterol, total 83 12/15/2021 07:08 AM    HDL Cholesterol 22 (L) 12/15/2021 07:08 AM    LDL, calculated 43.2 12/15/2021 07:08 AM    VLDL, calculated 17.8 12/15/2021 07:08 AM    Triglyceride 89 12/15/2021 07:08 AM    CHOL/HDL Ratio 3.8 12/15/2021 07:08 AM      BNP No results found for: BNP, BNPP, XBNPT   Liver Enzymes No results for input(s): TP, ALB, TBIL, AP in the last 72 hours.     No lab exists for component: SGOT, GPT, DBIL   Thyroid Studies Lab Results   Component Value Date/Time    T4, Total 9.0 11/09/2017 10:28 AM    TSH 2.08 04/08/2021 07:05 AM          Signed By: Madalyn Silvestre NP     March 7, 2022

## 2022-03-07 NOTE — PROGRESS NOTES
Groin dressing removed, site is clean  Incisions are both clean  Appreciate podiatry surgery  Consider rehab evaluation

## 2022-03-08 DIAGNOSIS — I73.9 PAD (PERIPHERAL ARTERY DISEASE) (HCC): ICD-10-CM

## 2022-03-08 DIAGNOSIS — I65.22 STENOSIS OF LEFT CAROTID ARTERY: ICD-10-CM

## 2022-03-08 DIAGNOSIS — Z89.612 S/P AKA (ABOVE KNEE AMPUTATION), LEFT (HCC): ICD-10-CM

## 2022-03-08 DIAGNOSIS — I65.21 CAROTID OCCLUSION, RIGHT: ICD-10-CM

## 2022-03-08 DIAGNOSIS — I74.09 AORTOILIAC OCCLUSIVE DISEASE (HCC): ICD-10-CM

## 2022-03-08 PROCEDURE — 94762 N-INVAS EAR/PLS OXIMTRY CONT: CPT

## 2022-03-08 PROCEDURE — 97530 THERAPEUTIC ACTIVITIES: CPT

## 2022-03-08 PROCEDURE — 2709999900 HC NON-CHARGEABLE SUPPLY

## 2022-03-08 PROCEDURE — 97535 SELF CARE MNGMENT TRAINING: CPT

## 2022-03-08 PROCEDURE — 74011000250 HC RX REV CODE- 250: Performed by: SURGERY

## 2022-03-08 PROCEDURE — 97165 OT EVAL LOW COMPLEX 30 MIN: CPT

## 2022-03-08 PROCEDURE — 74011250637 HC RX REV CODE- 250/637: Performed by: INTERNAL MEDICINE

## 2022-03-08 PROCEDURE — 65660000004 HC RM CVT STEPDOWN

## 2022-03-08 PROCEDURE — 74011250636 HC RX REV CODE- 250/636: Performed by: PHYSICIAN ASSISTANT

## 2022-03-08 PROCEDURE — 74011250636 HC RX REV CODE- 250/636: Performed by: SURGERY

## 2022-03-08 PROCEDURE — 99232 SBSQ HOSP IP/OBS MODERATE 35: CPT | Performed by: INTERNAL MEDICINE

## 2022-03-08 PROCEDURE — 97164 PT RE-EVAL EST PLAN CARE: CPT

## 2022-03-08 PROCEDURE — 74011250637 HC RX REV CODE- 250/637: Performed by: SURGERY

## 2022-03-08 PROCEDURE — 74011250637 HC RX REV CODE- 250/637: Performed by: PHYSICIAN ASSISTANT

## 2022-03-08 RX ORDER — DILTIAZEM HYDROCHLORIDE 240 MG/1
240 CAPSULE, COATED, EXTENDED RELEASE ORAL DAILY
Status: DISCONTINUED | OUTPATIENT
Start: 2022-03-09 | End: 2022-03-16 | Stop reason: HOSPADM

## 2022-03-08 RX ORDER — DIGOXIN 0.25 MG/ML
250 INJECTION INTRAMUSCULAR; INTRAVENOUS ONCE
Status: COMPLETED | OUTPATIENT
Start: 2022-03-08 | End: 2022-03-08

## 2022-03-08 RX ORDER — DILTIAZEM HYDROCHLORIDE 240 MG/1
240 CAPSULE, COATED, EXTENDED RELEASE ORAL 2 TIMES DAILY
Status: DISCONTINUED | OUTPATIENT
Start: 2022-03-08 | End: 2022-03-08

## 2022-03-08 RX ORDER — DILTIAZEM HYDROCHLORIDE 180 MG/1
180 CAPSULE, COATED, EXTENDED RELEASE ORAL 2 TIMES DAILY
Status: DISCONTINUED | OUTPATIENT
Start: 2022-03-08 | End: 2022-03-08

## 2022-03-08 RX ADMIN — DILTIAZEM HYDROCHLORIDE 240 MG: 240 CAPSULE, COATED, EXTENDED RELEASE ORAL at 08:39

## 2022-03-08 RX ADMIN — METOPROLOL TARTRATE 50 MG: 50 TABLET, FILM COATED ORAL at 08:39

## 2022-03-08 RX ADMIN — ASPIRIN 81 MG 81 MG: 81 TABLET ORAL at 08:38

## 2022-03-08 RX ADMIN — ATORVASTATIN CALCIUM 80 MG: 40 TABLET, FILM COATED ORAL at 21:47

## 2022-03-08 RX ADMIN — SODIUM CHLORIDE, PRESERVATIVE FREE 10 ML: 5 INJECTION INTRAVENOUS at 06:03

## 2022-03-08 RX ADMIN — OXYCODONE AND ACETAMINOPHEN 2 TABLET: 5; 325 TABLET ORAL at 08:38

## 2022-03-08 RX ADMIN — GABAPENTIN 400 MG: 400 CAPSULE ORAL at 21:47

## 2022-03-08 RX ADMIN — OXYCODONE AND ACETAMINOPHEN 2 TABLET: 5; 325 TABLET ORAL at 17:28

## 2022-03-08 RX ADMIN — DILTIAZEM HYDROCHLORIDE 180 MG: 180 CAPSULE, COATED, EXTENDED RELEASE ORAL at 17:19

## 2022-03-08 RX ADMIN — APIXABAN 5 MG: 5 TABLET, FILM COATED ORAL at 17:19

## 2022-03-08 RX ADMIN — SODIUM CHLORIDE, PRESERVATIVE FREE 10 ML: 5 INJECTION INTRAVENOUS at 13:23

## 2022-03-08 RX ADMIN — POLYETHYLENE GLYCOL 3350 17 G: 17 POWDER, FOR SOLUTION ORAL at 21:46

## 2022-03-08 RX ADMIN — SODIUM CHLORIDE, PRESERVATIVE FREE 10 ML: 5 INJECTION INTRAVENOUS at 21:47

## 2022-03-08 RX ADMIN — DIGOXIN 250 MCG: 250 INJECTION, SOLUTION INTRAMUSCULAR; INTRAVENOUS; PARENTERAL at 10:05

## 2022-03-08 RX ADMIN — HYDROMORPHONE HYDROCHLORIDE 0.5 MG: 1 INJECTION, SOLUTION INTRAMUSCULAR; INTRAVENOUS; SUBCUTANEOUS at 10:46

## 2022-03-08 RX ADMIN — DOCUSATE SODIUM 100 MG: 100 CAPSULE, LIQUID FILLED ORAL at 17:19

## 2022-03-08 RX ADMIN — GABAPENTIN 400 MG: 400 CAPSULE ORAL at 08:38

## 2022-03-08 RX ADMIN — HYDROMORPHONE HYDROCHLORIDE 0.5 MG: 1 INJECTION, SOLUTION INTRAMUSCULAR; INTRAVENOUS; SUBCUTANEOUS at 00:46

## 2022-03-08 RX ADMIN — APIXABAN 5 MG: 5 TABLET, FILM COATED ORAL at 08:38

## 2022-03-08 RX ADMIN — CETIRIZINE HYDROCHLORIDE 10 MG: 10 TABLET, FILM COATED ORAL at 08:38

## 2022-03-08 RX ADMIN — DOCUSATE SODIUM 100 MG: 100 CAPSULE, LIQUID FILLED ORAL at 08:38

## 2022-03-08 RX ADMIN — METOPROLOL TARTRATE 50 MG: 50 TABLET, FILM COATED ORAL at 17:19

## 2022-03-08 RX ADMIN — POLYETHYLENE GLYCOL 3350 17 G: 17 POWDER, FOR SOLUTION ORAL at 08:39

## 2022-03-08 RX ADMIN — GABAPENTIN 400 MG: 400 CAPSULE ORAL at 17:19

## 2022-03-08 NOTE — PROGRESS NOTES
Problem: Self Care Deficits Care Plan (Adult)  Goal: *Acute Goals and Plan of Care (Insert Text)  Description: Occupational Therapy Goals  Initiated 3/8/2022 within 7 day(s). 1.  Patient will perform bed>BSC using slide board while maintaining WB restrictions with modified independence. 2.  Patient will perform bed>w/c transfer using slide board while maintaining WB restructions in prep for bathroom mobility and self care participation with modified independence. 3.  Patient will perform all aspects of toileting with modified independence. 4.  Patient will participate in upper extremity therapeutic exercise/activities with modified independence for 10 minutes with there ex hand out and t band. Prior Level of Function:pt was Mi with ADLs and IADLs using w/c and RW. Pt with LLE AKA and prothesis. Outcome: Progressing Towards Goal   OCCUPATIONAL THERAPY EVALUATION    Patient: Eliezer Carlson (18 y.o. male)  Date: 3/8/2022  Primary Diagnosis: Occlusion of right femoral-popliteal bypass graft (HCC) [T82.898A]  Procedure(s) (LRB):  AMPUTATION RIGHT FOURTH AND FIFTH TOES PARTIAL METATARSAL AMPUTATION (Right) 4 Days Post-Op   Precautions:   Fall,Skin (partial WB to r heel, LLe AKA)  PLOF: see above    ASSESSMENT :  Based on the objective data described below, the patient presents with new partial WB orders to RLE due to amputation of R 4th and 5th toes. Pt to Wb through heel only. Per PT, pt demonstrates decreased ability to maintain WB restrictions with L prothesis donned and surgical shoe on R foot due to height discrepancy. Due to inability to maintain WB in stance, pt seen at bed level for OT eval. Pt sitting on edge of bed at beginning of session and agreeable to OT session. Pt performed scooting at edge of bed with MI without L prothesis donned.  Pt performed bathing and dressing routine with levels listed below demonstrating good safety awareness and demonstrating good ability to weight shift to bathe sunday area. Pt with WFL UB strength and AROM, however, would benefit from UE There ex routine due to need to rely more on UB for functional mobility at this time. Pt would benefit from training with slide board for safe functional transfers to [facility] and w/c in prep for self care at home. Patient will benefit from skilled intervention to address the above impairments. Patient's rehabilitation potential is considered to be Good  Factors which may influence rehabilitation potential include:   []             None noted  []             Mental ability/status  []             Medical condition  [x]             Home/family situation and support systems  []             Safety awareness  []             Pain tolerance/management  []             Other:      PLAN :  Recommendations and Planned Interventions:   [x]               Self Care Training                  [x]      Therapeutic Activities  [x]               Functional Mobility Training   []      Cognitive Retraining  [x]               Therapeutic Exercises           [x]      Endurance Activities  [x]               Balance Training                    [x]      Neuromuscular Re-Education  []               Visual/Perceptual Training     [x]      Home Safety Training  [x]               Patient Education                   [x]      Family Training/Education  []               Other (comment):    Frequency/Duration: Patient will be followed by occupational therapy 1-2 times per day/4-7 days per week to address goals. Discharge Recommendations: Rehab pending progress  Further Equipment Recommendations for Discharge: slide board     SUBJECTIVE:   Patient stated Siva Winkler will do whatever I need to do.     OBJECTIVE DATA SUMMARY:     Past Medical History:   Diagnosis Date    Acute blood loss as cause of postoperative anemia 4/4/2017    Anticoagulated on Coumadin     Aortoiliac occlusive disease ([facility] [address] 1/25/2017    Atherosclerosis of native artery of both lower extremities with intermittent claudication ([address] 1/25/2017    Benign hypertensive heart disease with systolic congestive heart failure, NYHA class 2 ([address] 1/26/2015    Carotid artery disease (HCC)     Chronic anemia     Chronic systolic heart failure (HCC)     Chronic ulcer of right foot ([address] 4/4/2017    Chronic ulcer of right heel ([address] 8/8/2017    Coronary artery disease involving native coronary artery of native heart 3/15/2017    Successful stenting of Cx (Xience LESLEY) and RCA (Xience LESLEY) to 0% by Dr. Luís Ibarra on 3/15/17.     DDD (degenerative disc disease), lumbar 1/26/2015    Dyslipidemia     Erectile dysfunction 7/5/2016    Euthyroid sick syndrome 4/6/2017    Hereditary peripheral neuropathy 11/15/2016    History of cardioversion 04/18/2017    S/P Synchronized external cardioversion (4/18/2017 - Dr. Cintia Romero)    History of vitamin D deficiency 4/22/2017    Vitamin D 25-Hydroxy (4/22/2017) = 12.0; Vitamin D 25-Hydroxy (5/26/2017) = 38.7    Infection of vascular bypass graft ([address] 7/24/2017    Left lower extremity    Ischemic cardiomyopathy     Moderate to severe pulmonary hypertension ([address] 07/23/2017    Paroxysmal atrial fibrillation ([address]     Peripheral artery disease ([address] 1/25/2017    Skin ulcer of malleolar area of right ankle ([address]     Spinal stenosis of lumbar region with radiculopathy 5/4/2015    Dr. Burnadette Goodell     Past Surgical History:   Procedure Laterality Date    CARDIAC CATHETERIZATION  3/8/2017         CARDIAC CATHETERIZATION  3/15/2017         CORONARY STENT SINGLE W/PTCA  3/15/2017         HX ABOVE KNEE AMPUTATION Left 08/18/2017    S/P Left above-the-knee amputation (8/18/2017 - Dr. Regi Rendon)    HX ATHERECTOMY Left 06/15/2017    S/P Atherectomy and balloon angioplasty of the left superficial femoral artery and above-knee popliteal artery (6/15/2017 - Dr. Regi Rendon)    HX ATHERECTOMY Right 09/07/2017    S/P Atherectomy and balloon angioplasty of the below-the-knee popliteal artery, above-the-knee popliteal artery, tibioperoneal trunk artery and posterior tibial artery (9/7/2017 - Dr. Mary Reddy)    HX COLONOSCOPY  07/23/2015    polyps repeat 2020 5 years Eliza Henderson Right 04/04/2017    S/P Right axillary to bifemoral artery bypass using an 8 mm Propaten graft from the right axilla to the right common femoral artery with a jump femoral-femoral bypass with another 8 mm Propaten external ring bypass; Right common femoral artery, and profunda femoris artery and superficial femoral artery endarterectomy causing an extensive surgery on the right side (4/4/2017 - Dr. Jennifer Denny)    [address] Right 04/07/2017    S/P Cutdown of femoral-femoral bypass, more on the left groin side; Right lower extremity angiography with first-order catheterization (4/7/2017 - Dr. Mary Reddy)    [address] Right 04/10/2017    S/P Right femoral to above-knee popliteal bypass with an 8 mm PTFE graft (4/10/2017 - Dr. Yehuda Malin)    HX OTHER SURGICAL Left 07/25/2017    S/P Removal of infected graft; Repair of left superficial femoral artery; Repair of left common femoral artery (7/25/2017 - Dr. Mary Reddy)    HX OTHER SURGICAL Right 06/27/2017    S/P Drainage of right side abscess (6/27/2017 - Dr. Mary Reddy)    HX OTHER SURGICAL Left 07/01/2017    S/P Left groin exploration; Left femoral repair pseudoaneurysm; Left wound washout; Wound VAC placement (7/01/2017 - Dr. Mary Reddy)    HX OTHER SURGICAL Left 07/04/2017    S/P Left common femoral artery ligation; Jump bypass from right to left fem-fem to a more distal superficial femoral artery using 8 mm external ringed Propaten graft; Left groin wound exploration and washout (7/4/2017 - Dr. Mary Reddy)    HX OTHER SURGICAL Right 08/18/2017    S/P Right axillary femoral jump bypass interposition; Removal of infected right axillary femoral bypass;  Wound VAC placement of upper thigh 1.2 cm x 5.2 cm x 1.8 cm and groin 4 cm x 0.5 cm x 0.2 cm (8/18/2017 - Dr. Yarely Patel)     Barriers to Learning/Limitations: None  Compensate with: visual, verbal, tactile, kinesthetic cues/model    Home Situation:   Home Situation  Home Environment: Private residence  One/Two Story Residence: One story  Living Alone: Yes  Support Systems: Child(mile)  Patient Expects to be Discharged to[de-identified] Home  Current DME Used/Available at Home: Lige Fling, rolling,Wheelchair,Shower chair  Tub or Shower Type: Tub/Shower combination  [x]  Right hand dominant   []  Left hand dominant    Cognitive/Behavioral Status:  Neurologic State: Alert  Orientation Level: Oriented X4  Cognition: Follows commands  Safety/Judgement: Fall prevention;Home safety    Skin: intact  Edema: none noted    Vision/Perceptual:            WFL     Coordination: BUE  Coordination: Within functional limits  Fine Motor Skills-Upper: Left Intact; Right Intact    Gross Motor Skills-Upper: Left Intact; Right Intact  Balance:  Sitting: Intact  Standing: Impaired; With support  Standing - Static: Fair  Strength: BUE  Strength: Within functional limits     Tone & Sensation: BUE  Tone: Normal  Sensation: Intact   Range of Motion: BUE  AROM: Within functional limits      Functional Mobility and Transfers for ADLs:  Bed Mobility:     Supine to Sit: Modified independent  Sit to Supine: Modified independent  Scooting: Modified independent  Transfers:  Sit to Stand: Minimum assistance  Stand to Sit: Stand-by assistance   ADL Assessment:   Feeding: Independent  Oral Facial Hygiene/Grooming: Independent  Bathing: Supervision  Upper Body Dressing: Independent  Lower Body Dressing: Modified independent  Toileting: Supervision   ADL Intervention:   Upper Body Bathing  Bathing Assistance: Modified independent; Set-up  Position Performed: Seated edge of bed  Lower Body Bathing  Bathing Assistance: Supervision  Perineal  : Supervision;Set-up  Upper Body Dressing Assistance  Dressing Assistance: [id number] [facility] [id number]: Independent  Cognitive Retraining  Safety/Judgement: Fall prevention;Home safety  Pain:  Pain level pre-treatment: 0/10   Pain level post-treatment: 0/10   Pain Intervention(s): Medication (see MAR); Rest,  Repositioning   Response to intervention: Nurse notified, See doc flow    Activity Tolerance:   good  Please refer to the flowsheet for vital signs taken during this treatment. After treatment:   [] Patient left in no apparent distress sitting up in chair  [x] Patient left in no apparent distress in bed  [x] Call bell left within reach  [x] Nursing notified  [] Caregiver present  [] Bed alarm activated    COMMUNICATION/EDUCATION:   [x] Role of Occupational Therapy in the acute care setting  [x] Home safety education was provided and the patient/caregiver indicated understanding. [x] Patient/family have participated as able in goal setting and plan of care. [x] Patient/family agree to work toward stated goals and plan of care. [] Patient understands intent and goals of therapy, but is neutral about his/her participation. [] Patient is unable to participate in goal setting and plan of care. Thank you for this referral.  Savita Suarez OT  Time Calculation: 23 mins    Eval Complexity: History: MEDIUM Complexity : Expanded review of history including physical, cognitive and psychosocial  history ; Examination: MEDIUM Complexity : 3-5 performance deficits relating to physical, cognitive , or psychosocial skils that result in activity limitations and / or participation restrictions; Decision Making:MEDIUM Complexity : Patient may present with comorbidities that affect occupational performnce.  Miniml to moderate modification of tasks or assistance (eg, physical or verbal ) with assesment(s) is necessary to enable patient to complete evaluation

## 2022-03-08 NOTE — ROUTINE PROCESS
2100: Pedis dorsalis pulse to right root heard through doppler. 0000: Pedis dorsalis pulse to right root heard through doppler. Patient complained of pain and medication given. 0400: Pedis dorsalis pulse to right root heard through doppler. 0730: Bedside and Verbal shift change report given to Timmy Peres (oncoming nurse) by Leona Stark RN (offgoing nurse).  Report included the following information SBAR, Kardex, Intake/Output, MAR, Recent Results and Cardiac Rhythm Afib.

## 2022-03-08 NOTE — PROGRESS NOTES
Problem: Mobility Impaired (Adult and Pediatric)  Goal: *Acute Goals and Plan of Care (Insert Text)  Description: Physical Therapy Goals  Initiated 3/8/2022 and to be accomplished within 7 day(s)  1. Patient will move from supine to sit and sit to supine in bed with modified independence. 2.  Patient will transfer from bed to chair and chair to bed with modified independence using the least restrictive device. 3.  Patient will maintain WB precautions 80% throughout mobility. PLOF: Patient lives alone in single story home. He has prosthetic for left AKA. Also had RW and WC. Outcome: Progressing Towards Goal       PHYSICAL THERAPY RE-EVALUATION    Patient: Bonnie Freeman (12 y.o. 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 Educated patient on partial WB to the right heel. Patient is independent with bed mobility. He is able to jasmeet left prosthetic on his own. Post-op shoe given for right foot. Min A for sit to stand transfer using RW, though leg length discrepancy does not allow patient to place right heel down, therefore unsafe for further OOB mobility using RW. Patient may benefit from slide board transfer training in order to maintain WB precautions. Patient will benefit from skilled intervention to address the above impairments.   Patient's rehabilitation potential is considered to be Good  Factors which may influence rehabilitation potential include:   []         None noted  []         Mental ability/status  [x]         Medical condition  []         Home/family situation and support systems  []         Safety awareness  []         Pain tolerance/management  []         Other:      PLAN :  Recommendations and Planned Interventions:   [x]           Bed Mobility Training             [x]    Neuromuscular Re-Education  [x]           Transfer Training                   []    Orthotic/Prosthetic Training  [x]           Gait Training                          []    Modalities  [x]           Therapeutic Exercises           []    Edema Management/Control  [x]           Therapeutic Activities            [x]    Family Training/Education  [x]           Patient Education  []           Other (comment):    Frequency/Duration: Patient will be followed by physical therapy 1-2 times per day/4-7 days per week to address goals. Discharge Recommendations: Home Health vs rehab pending evaluation of slide board transfers  Further Equipment Recommendations for Discharge: bedside commode, slide board     SUBJECTIVE:   Patient stated I have had it for 5 years.     OBJECTIVE DATA SUMMARY:   Hospital course since last seen and reason for re-evaluation: Patient due for PT re-eval following right toe amputation. Past Medical History:   Diagnosis Date    Acute blood loss as cause of postoperative anemia 4/4/2017    Anticoagulated on Coumadin     Aortoiliac occlusive disease ([address] 1/25/2017    Atherosclerosis of native artery of both lower extremities with intermittent claudication ([address] 1/25/2017    Benign hypertensive heart disease with systolic congestive heart failure, NYHA class 2 ([address] 1/26/2015    Carotid artery disease (HCC)     Chronic anemia     Chronic systolic heart failure (HCC)     Chronic ulcer of right foot ([address] 4/4/2017    Chronic ulcer of right heel ([address] 8/8/2017    Coronary artery disease involving native coronary artery of native heart 3/15/2017    Successful stenting of Cx (Xience LESLEY) and RCA (Xience LESLEY) to 0% by Dr. Yas Stallings on 3/15/17.     DDD (degenerative disc disease), lumbar 1/26/2015    Dyslipidemia     Erectile dysfunction 7/5/2016    Euthyroid sick syndrome 4/6/2017    Hereditary peripheral neuropathy 11/15/2016    History of cardioversion 04/18/2017    S/P Synchronized external cardioversion (4/18/2017 - Dr. Maddy Wood)    History of vitamin D deficiency 4/22/2017    Vitamin D 25-Hydroxy (4/22/2017) = 12.0; Vitamin D 25-Hydroxy (5/26/2017) = 38.7    Infection of vascular bypass graft ([facility] 7/24/2017    Left lower extremity    Ischemic cardiomyopathy     Moderate to severe pulmonary hypertension ([address] 07/23/2017    Paroxysmal atrial fibrillation ([address]     Peripheral artery disease ([address] 1/25/2017    Skin ulcer of malleolar area of right ankle ([address]     Spinal stenosis of lumbar region with radiculopathy 5/4/2015    Dr. Shay Turk     Past Surgical History:   Procedure Laterality Date    CARDIAC CATHETERIZATION  3/8/2017         CARDIAC CATHETERIZATION  3/15/2017         CORONARY STENT SINGLE W/PTCA  3/15/2017         HX ABOVE KNEE AMPUTATION Left 08/18/2017    S/P Left above-the-knee amputation (8/18/2017 - Dr. Tobin Peraza)    HX ATHERECTOMY Left 06/15/2017    S/P Atherectomy and balloon angioplasty of the left superficial femoral artery and above-knee popliteal artery (6/15/2017 - Dr. Tobin Peraza)    HX ATHERECTOMY Right 09/07/2017    S/P Atherectomy and balloon angioplasty of the below-the-knee popliteal artery, above-the-knee popliteal artery, tibioperoneal trunk artery and posterior tibial artery (9/7/2017 - Dr. Tobin Peraza)    HX COLONOSCOPY  07/23/2015    polyps repeat 2020 5 years Eliz Henderson Right 04/04/2017    S/P Right axillary to bifemoral artery bypass using an 8 mm Propaten graft from the right axilla to the right common femoral artery with a jump femoral-femoral bypass with another 8 mm Propaten external ring bypass; Right common femoral artery, and profunda femoris artery and superficial femoral artery endarterectomy causing an extensive surgery on the right side (4/4/2017 - Dr. Naun Dooley Miguel)    HX FEMORAL BYPASS Right 04/07/2017    S/P Cutdown of femoral-femoral bypass, more on the left groin side; Right lower extremity angiography with first-order catheterization (4/7/2017 - Dr. Maida Rinne)    [address] Right 04/10/2017    S/P Right femoral to above-knee popliteal bypass with an 8 mm PTFE graft (4/10/2017 - Dr. Fernie Mckinney)    HX OTHER SURGICAL Left 07/25/2017    S/P Removal of infected graft; Repair of left superficial femoral artery; Repair of left common femoral artery (7/25/2017 - Dr. Maida Rinne)    HX OTHER SURGICAL Right 06/27/2017    S/P Drainage of right side abscess (6/27/2017 - Dr. Maida Rinne)    HX OTHER SURGICAL Left 07/01/2017    S/P Left groin exploration; Left femoral repair pseudoaneurysm; Left wound washout; Wound VAC placement (7/01/2017 - Dr. Maida Rinne)    HX OTHER SURGICAL Left 07/04/2017    S/P Left common femoral artery ligation; Jump bypass from right to left fem-fem to a more distal superficial femoral artery using 8 mm external ringed Propaten graft; Left groin wound exploration and washout (7/4/2017 - Dr. Maida Rinne)    HX OTHER SURGICAL Right 08/18/2017    S/P Right axillary femoral jump bypass interposition; Removal of infected right axillary femoral bypass;  Wound VAC placement of upper thigh 1.2 cm x 5.2 cm x 1.8 cm and groin 4 cm x 0.5 cm x 0.2 cm (8/18/2017 - Dr. Maida Rinne)     Barriers to Learning/Limitations: None  Compensate with: N/A  Home Situation:   Home Situation  Home Environment: Private residence  One/Two Story Residence: One story  Living Alone: Yes  Support Systems: Child(mile)  Patient Expects to be Discharged to[de-identified] Home  Current DME Used/Available at Home: Wheelchair,Walker, rolling (L prosthetic leg )  Critical Behavior:  Neurologic State: Alert;Eyes open spontaneously  Orientation Level: Oriented X4  Cognition: Follows commands     Psychosocial  Patient Behaviors: Calm;Cooperative  Purposeful Interaction: Yes  Pt Identified Daily Priority: Clinical issues (comment)  Caritas Process: Nurture loving kindness;Create healing environment;Supportive expression  Caring Interventions: Therapeutic modalities  Reassure: Therapeutic listening  Therapeutic Modalities: Humor  Other Caring Modalities: hourly rounds        Skin Integrity: Incision (comment)  Skin Integumentary  Skin Integrity: Incision (comment)  Turgor: Non-tenting     Strength:    Strength: Generally decreased, functional              Tone & Sensation:   Tone: Normal              Sensation: Impaired               Range Of Motion:  AROM: Generally decreased, functional                   Functional Mobility:  Bed Mobility:     Supine to Sit: Modified independent  Sit to Supine: Modified independent  Scooting: Modified independent    Transfers:  Sit to Stand: Minimum assistance  Stand to Sit: Stand-by assistance             Balance:   Sitting: Intact  Standing: Impaired; With support  Standing - Static: Fair    Therapeutic Exercises: Ankle pumps    Pain:  Pain level pre-treatment: 5/10 right foot  Pain level post-treatment: 6/10   Pain Intervention(s) : Medication (see MAR); Rest, Ice, Repositioning   Response to intervention: Nurse notified, See doc flow    Activity Tolerance:   Fair +  Please refer to the flowsheet for vital signs taken during this treatment. After treatment:   []         Patient left in no apparent distress sitting up in chair  [x]         Patient left in no apparent distress in bed  [x]         Call bell left within reach  [x]         Nursing notified  []         Caregiver present  []         Bed alarm activated  []         SCDs applied    COMMUNICATION/EDUCATION:   []         Role of Physical Therapy in the acute care setting. [x]         Fall prevention education was provided and the patient/caregiver indicated understanding.   [x]         Patient/family have participated as able in goal setting and plan of care. [x]         Patient/family agree to work toward stated goals and plan of care. []         Patient understands intent and goals of therapy, but is neutral about his/her participation. []         Patient is unable to participate in goal setting/plan of care: ongoing with therapy staff.  []         Other:     Thank you for this referral.  Rose Wang, PT   Time Calculation: 35 mins

## 2022-03-08 NOTE — PROGRESS NOTES
Cardiology Progress Note    Admit Date: 2/28/2022  Attending Cardiologist: Dr. Scott Ni:     Hospital Problems  Date Reviewed: 3/2/2022          Codes Class Noted POA    Occlusion of right femoral-popliteal bypass graft [facility]) ICD-10-CM: T82.898A  ICD-9-CM: 996.74  2/28/2022 Unknown        Atrial fibrillation ([facility] [address] ICD-10-CM: I48.91  ICD-9-CM: 427.31  Unknown Unknown              - Occlusion of right femoral-popliteal bypass graft now s/p right axillofemoral graft bypass to right anterior tibial bypass with cadaver vein 2/28/22.  - Gangrenous toes right foot s/p amputations 3/4/22.  - CAF: Patient previously had paroxysmal atrial fibrillation s/p remote cardioversion now appears persistent. With RVR this admission. On CCB, BB and Xarelto as outpatient. - Hypokalemia. Continue replacement as needed. - PAD s/p previous right fempop bypass and previous left AKA. - CAD s/p Cx and RCA PCI 2017.  - H/o Jamaal Riggers with improved EF 55-60% 4/2021. Reduced left ventricular systolic function on echo this admission.  - Hypertension. Soft. - Hyperlipidemia. On statin.     Echo this admission.   Left Ventricle: Left ventricle size is normal. Normal wall thickness. Normal wall motion. Reduced left ventricular systolic function.   Right Ventricle: Normal systolic function.   Tricuspid Valve: Moderate wide open transvalvular regurgitation.   Left Atrium: Left atrium is moderately dilated. LA Vol Index A/L is 45 mL/m2.   Right Atrium: Right atrium is dilated.   Pulmonary Arteries: Pulmonary hypertension not present. Pulmonary artery pressure is underestimated due to wide open tricuspid regurgitation.   Aorta: Mildly dilated aortic root. Ao Root diameter is 3.6 cm. Primary cardiologist Dr. Lissy Ludwig. Plan:     Patients HR remains elevated, will uptitrate cardizem. Maintenance digoxin can be started if needed, giving one time dose now.   Patient takes Cardizem  every morning and 120 every evening at home.  Continued on Lopressor. Continued on Eliquis. Home losartan and lasix remain on hold to allow for uptitration of AV jorge medications. Will discuss echo findings with team.  Hopefully home soon. Plan as above. Heart rate was on the high side this morning but and 70s to 90s in the afternoon. Blood pressures are low normal in the low 805C systolic. We will keep Cardizem at 240 mg a day for now. Watch BPs closely. Patient will likely need diuretics and preferably losartan when blood pressures are better. Patient has diagnosis of chronic CHF and given his ejection fraction reported as reduced this time and normal a year ago, he seems to have chronic combined systolic and diastolic CHF.     Subjective:     No CP, No     Objective:      Patient Vitals for the past 8 hrs:   Temp Pulse Resp BP SpO2   03/08/22 0754 97.4 °F (36.3 °C) 93 14 133/68 99 %   03/08/22 0400 98.7 °F (37.1 °C) (!) 103 21 133/68 98 %         Patient Vitals for the past 96 hrs:   Weight   03/07/22 0400 187 lb (84.8 kg)   03/06/22 0400 187 lb (84.8 kg)   03/04/22 1522 177 lb (80.3 kg)       TELE: AFIB               Current Facility-Administered Medications   Medication Dose Route Frequency Last Admin    dilTIAZem ER (CARDIZEM CD) capsule 240 mg  240 mg Oral DAILY 240 mg at 03/08/22 0839    apixaban (ELIQUIS) tablet 5 mg  5 mg Oral BID 5 mg at 03/08/22 0838    polyethylene glycol (MIRALAX) packet 17 g  17 g Oral BID 17 g at 03/08/22 0839    docusate sodium (COLACE) capsule 100 mg  100 mg Oral  mg at 03/08/22 0838    cetirizine (ZYRTEC) tablet 10 mg  10 mg Oral DAILY 10 mg at 03/08/22 0838    diphenhydrAMINE (BENADRYL) capsule 25 mg  25 mg Oral Q6H PRN 25 mg at 03/03/22 1439    ELECTROLYTE REPLACEMENT PROTOCOL - Potassium Standard Dosing   1 Each Other PRN      ELECTROLYTE REPLACEMENT PROTOCOL - Magnesium   1 Each Other PRN      sodium chloride (NS) flush 10 mL  10 mL IntraVENous Q8H 10 mL at 03/08/22 0603    oxyCODONE-acetaminophen (PERCOCET) 5-325 mg per tablet 2 Tablet  2 Tablet Oral Q4H PRN 2 Tablet at 03/08/22 0838    aspirin chewable tablet 81 mg  81 mg Oral DAILY 81 mg at 03/08/22 0838    atorvastatin (LIPITOR) tablet 80 mg  80 mg Oral QHS 80 mg at 03/07/22 2149    [Held by provider] furosemide (LASIX) tablet 20 mg  20 mg Oral DAILY 20 mg at 03/01/22 4200    gabapentin (NEURONTIN) capsule 400 mg  400 mg Oral  mg at 03/08/22 0838    [Held by provider] losartan (COZAAR) tablet 100 mg  100 mg Oral DAILY 100 mg at 03/01/22 0829    metoprolol tartrate (LOPRESSOR) tablet 50 mg  50 mg Oral BID 50 mg at 03/08/22 0839    oxyCODONE-acetaminophen (PERCOCET) 5-325 mg per tablet 1 Tablet  1 Tablet Oral Q4H PRN 1 Tablet at 03/07/22 0911    HYDROmorphone (DILAUDID) syringe 0.5-1 mg  0.5-1 mg IntraVENous Q2H PRN 0.5 mg at 03/08/22 0046    naloxone (NARCAN) injection 0.4 mg  0.4 mg IntraVENous PRN      metoprolol (LOPRESSOR) injection 5 mg  5 mg IntraVENous Q6H PRN 5 mg at 03/01/22 0731         Intake/Output Summary (Last 24 hours) at 3/8/2022 0853  Last data filed at 3/8/2022 0604  Gross per 24 hour   Intake 240 ml   Output 2000 ml   Net -1760 ml       Physical Exam:  General:  alert, cooperative, no distress, appears stated age  Neck:  no JVD  Lungs:  clear to auscultation bilaterally  Heart:  irregularly irregular rhythm  Abdomen:  abdomen is soft without significant tenderness, masses, organomegaly or guarding  Extremities:  Trace to 1+ edema right leg,  L AKA    Visit Vitals  /68   Pulse 93   Temp 97.4 °F (36.3 °C)   Resp 14   Ht 5' 10\" (1.778 m)   Wt 187 lb (84.8 kg)   SpO2 99%   BMI 26.83 kg/m²       Data Review:     Labs: Results:       Chemistry No results for input(s): GLU, NA, K, CL, CO2, BUN, CREA, CA, MG, PHOS, AGAP, BUCR, TBIL, AP, TP, ALB, GLOB, AGRAT in the last 72 hours.     No lab exists for component: GPT   CBC w/Diff Recent Labs     03/07/22  0421 03/06/22  0428   WBC 9.1 7.9   RBC 3.11* 3.11*   HGB 9.2* 9.0*   HCT 27.6* 27.8*   * 402   GRANS 65 60   LYMPH 21 26   EOS 2 2      Cardiac Enzymes No results found for: CPK, CK, CKMMB, CKMB, RCK3, CKMBT, CKNDX, CKND1, LEO, TROPT, TROIQ, DEWAYNE, TROPT, TNIPOC, BNP, BNPP   Coagulation Recent Labs     03/07/22  0421 03/06/22  0428   APTT 43.2* 82.3*       Lipid Panel Lab Results   Component Value Date/Time    Cholesterol, total 83 12/15/2021 07:08 AM    HDL Cholesterol 22 (L) 12/15/2021 07:08 AM    LDL, calculated 43.2 12/15/2021 07:08 AM    VLDL, calculated 17.8 12/15/2021 07:08 AM    Triglyceride 89 12/15/2021 07:08 AM    CHOL/HDL Ratio 3.8 12/15/2021 07:08 AM      BNP No results found for: BNP, BNPP, XBNPT   Liver Enzymes No results for input(s): TP, ALB, TBIL, AP in the last 72 hours. No lab exists for component: SGOT, GPT, DBIL   Thyroid Studies Lab Results   Component Value Date/Time    T4, Total 9.0 11/09/2017 10:28 AM    TSH 2.08 04/08/2021 07:05 AM          Signed By: Laymon Litten, PA     March 8, 2022    I have personally and independently evaluated and examined the patient. All relevant labs and testing data are reviewed. I have personally reviewed all the diagnostic labs, available EKG imaging x-rays and other diagnostic data and incorporated them into my care. I am referencing APC's note. I participated in medical decision making. Care plan discussed and updated after review. More than 50% time spent reviewing data, examining patient and recommending assessment and plan.   Anastacio Nguyen MD

## 2022-03-08 NOTE — PROGRESS NOTES
Physician Progress Note      PATIENT:               Rose Salamanca  CSN #:                  [id number]  :                       1960  ADMIT DATE:       2022 9:37 AM  [address] [place] DATE:  RESPONDING  PROVIDER #:        Baljinder Niño MD          QUERY TEXT:    Dear Cardiology  Pt admitted Occlusion of right femoral-popliteal bypass graft now s/p right axillofemoral graft bypass to right anterior tibial bypass with cadaver vein 22 with  and has pmh of CHF documented. If possible, please document in progress notes and discharge summary further specificity regarding the type and acuity of CHF:    The medical record reflects the following:  Risk Factors: Paroxysmal atrial fibrillation ,CHF , PAD,CAD  Clinical Indicators: Card PN -H/o Lamont Babe with improved EF 55-60% 2021 ECHO result  Treatment: Card consult ,Lasix and losartan currently on hold ,wean cardizem drip as HR allows ,continued on heparin infusion  Thank you  [email or web address]  Options provided:  -- Chronic Systolic CHF/HFrEF  -- Chronic Diastolic CHF/HFpEF  -- Chronic Systolic and Diastolic CHF  -- Other - I will add my own diagnosis  -- Disagree - Not applicable / Not valid  -- Disagree - Clinically unable to determine / Unknown  -- Refer to Clinical Documentation Reviewer    PROVIDER RESPONSE TEXT:    This patient has chronic systolic and diastolic CHF. Query created by:  Mio High on 3/3/2022 9:15 AM      Electronically signed by:  Baljinder Niño MD 3/8/2022 6:29 PM

## 2022-03-09 PROCEDURE — 99232 SBSQ HOSP IP/OBS MODERATE 35: CPT | Performed by: INTERNAL MEDICINE

## 2022-03-09 PROCEDURE — 74011250637 HC RX REV CODE- 250/637: Performed by: INTERNAL MEDICINE

## 2022-03-09 PROCEDURE — 97535 SELF CARE MNGMENT TRAINING: CPT

## 2022-03-09 PROCEDURE — 74011250637 HC RX REV CODE- 250/637: Performed by: SURGERY

## 2022-03-09 PROCEDURE — 97530 THERAPEUTIC ACTIVITIES: CPT

## 2022-03-09 PROCEDURE — 74011250636 HC RX REV CODE- 250/636: Performed by: SURGERY

## 2022-03-09 PROCEDURE — 74011000250 HC RX REV CODE- 250: Performed by: SURGERY

## 2022-03-09 PROCEDURE — 74011250637 HC RX REV CODE- 250/637: Performed by: PHYSICIAN ASSISTANT

## 2022-03-09 PROCEDURE — 65660000004 HC RM CVT STEPDOWN

## 2022-03-09 RX ORDER — DIGOXIN 250 MCG
0.25 TABLET ORAL DAILY
Status: DISCONTINUED | OUTPATIENT
Start: 2022-03-09 | End: 2022-03-16 | Stop reason: HOSPADM

## 2022-03-09 RX ORDER — LOSARTAN POTASSIUM 25 MG/1
25 TABLET ORAL DAILY
Status: DISCONTINUED | OUTPATIENT
Start: 2022-03-10 | End: 2022-03-16 | Stop reason: HOSPADM

## 2022-03-09 RX ADMIN — SODIUM CHLORIDE, PRESERVATIVE FREE 10 ML: 5 INJECTION INTRAVENOUS at 21:15

## 2022-03-09 RX ADMIN — METOPROLOL TARTRATE 50 MG: 50 TABLET, FILM COATED ORAL at 17:07

## 2022-03-09 RX ADMIN — SODIUM CHLORIDE, PRESERVATIVE FREE 10 ML: 5 INJECTION INTRAVENOUS at 14:00

## 2022-03-09 RX ADMIN — HYDROMORPHONE HYDROCHLORIDE 1 MG: 1 INJECTION, SOLUTION INTRAMUSCULAR; INTRAVENOUS; SUBCUTANEOUS at 19:41

## 2022-03-09 RX ADMIN — DIGOXIN 0.25 MG: 250 TABLET ORAL at 11:10

## 2022-03-09 RX ADMIN — OXYCODONE AND ACETAMINOPHEN 2 TABLET: 5; 325 TABLET ORAL at 11:13

## 2022-03-09 RX ADMIN — GABAPENTIN 400 MG: 400 CAPSULE ORAL at 21:15

## 2022-03-09 RX ADMIN — APIXABAN 5 MG: 5 TABLET, FILM COATED ORAL at 08:20

## 2022-03-09 RX ADMIN — METOPROLOL TARTRATE 50 MG: 50 TABLET, FILM COATED ORAL at 08:20

## 2022-03-09 RX ADMIN — APIXABAN 5 MG: 5 TABLET, FILM COATED ORAL at 17:07

## 2022-03-09 RX ADMIN — GABAPENTIN 400 MG: 400 CAPSULE ORAL at 08:20

## 2022-03-09 RX ADMIN — OXYCODONE AND ACETAMINOPHEN 2 TABLET: 5; 325 TABLET ORAL at 15:10

## 2022-03-09 RX ADMIN — GABAPENTIN 400 MG: 400 CAPSULE ORAL at 15:10

## 2022-03-09 RX ADMIN — CETIRIZINE HYDROCHLORIDE 10 MG: 10 TABLET, FILM COATED ORAL at 08:20

## 2022-03-09 RX ADMIN — ASPIRIN 81 MG 81 MG: 81 TABLET ORAL at 08:20

## 2022-03-09 RX ADMIN — ATORVASTATIN CALCIUM 80 MG: 40 TABLET, FILM COATED ORAL at 21:15

## 2022-03-09 RX ADMIN — HYDROMORPHONE HYDROCHLORIDE 0.5 MG: 1 INJECTION, SOLUTION INTRAMUSCULAR; INTRAVENOUS; SUBCUTANEOUS at 08:27

## 2022-03-09 RX ADMIN — SODIUM CHLORIDE, PRESERVATIVE FREE 10 ML: 5 INJECTION INTRAVENOUS at 05:06

## 2022-03-09 RX ADMIN — DOCUSATE SODIUM 100 MG: 100 CAPSULE, LIQUID FILLED ORAL at 08:20

## 2022-03-09 RX ADMIN — DILTIAZEM HYDROCHLORIDE 240 MG: 240 CAPSULE, COATED, EXTENDED RELEASE ORAL at 08:20

## 2022-03-09 RX ADMIN — DOCUSATE SODIUM 100 MG: 100 CAPSULE, LIQUID FILLED ORAL at 17:07

## 2022-03-09 NOTE — PROGRESS NOTES
Cardiology Progress Note    Admit Date: 2/28/2022  Attending Cardiologist: Dr. Velazquez Aas:      - Occlusion of right femoral-popliteal bypass graft now s/p right axillofemoral graft bypass to right anterior tibial bypass with cadaver vein 2/28/22.  - Gangrenous toes right foot s/p amputations 3/4/22.  - CAF: Patient previously had paroxysmal atrial fibrillation s/p remote cardioversion now appears persistent. With RVR this admission. On CCB, BB and Xarelto as outpatient. - Hypokalemia. Continue replacement as needed. - PAD s/p previous right fempop bypass and previous left AKA. - CAD s/p Cx and RCA PCI 2017.  - H/o [place] with improved EF 55-60% 4/2021. Reduced left ventricular systolic function on echo this admission.  - Hypertension. Soft. - Hyperlipidemia. On statin.     Echo this admission.   Left Ventricle: Left ventricle size is normal. Normal wall thickness. Normal wall motion. Reduced left ventricular systolic function.   Right Ventricle: Normal systolic function.   Tricuspid Valve: Moderate wide open transvalvular regurgitation.   Left Atrium: Left atrium is moderately dilated. LA Vol Index A/L is 45 mL/m2.   Right Atrium: Right atrium is dilated.   Pulmonary Arteries: Pulmonary hypertension not present. Pulmonary artery pressure is underestimated due to wide open tricuspid regurgitation.   Aorta: Mildly dilated aortic root. Ao Root diameter is 3.6 cm.     Primary cardiologist Dr. Damaris Junior. Plan:       I saw, evaluated, interviewed and examined the patient personally. Chest pain or chest tightness. Denies any significant dyspnea  Hemodynamically stable. Heart rate is mostly below 100. Remains in A. Fib. Impression and plan:  Permanent A. fib. Heart rate overall better controlled.   Continue with Cardizem, beta-blocker and low-dose of digoxin  Patient initially was on losartan 100 mg daily however I will discontinue that and start only losartan 25 mg daily  No further cardiac work up planned at this time unless clinical status changes. Call us back if needed. Will be available as needed. Will need to follow up in cardiology clinic in 2-3 weeks after discharge. Thank you. Significant time spent in reviewing the case, multiple EMR databases, physician notes, reviewing pertinent labs and imaging studies  I spent significant amount of time for medical decision making and updated history, and other providers assessments as well. I personally agree with the findings as stated and the plan as documented. I saw, examined, and evaluated this patient and performed the substantive portion of the encounter for > 50% of the time including extensive history, physical exam, assessment and plan    Sb Cooper MD       HR overall improved, HR remains low 100s with activity. Patient continued on cardizem 240 once daily at this time as Bp allows. Patient is continued on lopressor 50 BID. Will add maintenance digoxin. Patient is continued on Eliquis and ASA. Patient is continued on statin. Home lasix and losartan remain on hold given soft BP this admission, will need to reassess as outpatient. Otherwise no further cardiac work up. Dispo/placement pending. Subjective:     No CP, No SOB.     Objective:      Patient Vitals for the past 8 hrs:   Temp Pulse Resp BP SpO2   03/09/22 0718 98.2 °F (36.8 °C) 98 12 121/80 95 %   03/09/22 0330 -- 82 18 105/69 97 %         Patient Vitals for the past 96 hrs:   Weight   03/09/22 0330 177 lb 14.4 oz (80.7 kg)   03/07/22 0400 187 lb (84.8 kg)   03/06/22 0400 187 lb (84.8 kg)       TELE: AFIB               Current Facility-Administered Medications   Medication Dose Route Frequency Last Admin    digoxin (LANOXIN) tablet 0.25 mg  0.25 mg Oral DAILY      dilTIAZem ER (CARDIZEM CD) capsule 240 mg  240 mg Oral DAILY 240 mg at 03/09/22 0820    apixaban (ELIQUIS) tablet 5 mg  5 mg Oral BID 5 mg at 03/09/22 0820    polyethylene glycol (MIRALAX) packet 17 g  17 g Oral BID 17 g at 03/08/22 2146    docusate sodium (COLACE) capsule 100 mg  100 mg Oral  mg at 03/09/22 0820    cetirizine (ZYRTEC) tablet 10 mg  10 mg Oral DAILY 10 mg at 03/09/22 0820    diphenhydrAMINE (BENADRYL) capsule 25 mg  25 mg Oral Q6H PRN 25 mg at 03/03/22 1439    ELECTROLYTE REPLACEMENT PROTOCOL - Potassium Standard Dosing   1 Each Other PRN      ELECTROLYTE REPLACEMENT PROTOCOL - Magnesium   1 Each Other PRN      sodium chloride (NS) flush 10 mL  10 mL IntraVENous Q8H 10 mL at 03/09/22 0506    oxyCODONE-acetaminophen (PERCOCET) 5-325 mg per tablet 2 Tablet  2 Tablet Oral Q4H PRN 2 Tablet at 03/08/22 1728    aspirin chewable tablet 81 mg  81 mg Oral DAILY 81 mg at 03/09/22 0820    atorvastatin (LIPITOR) tablet 80 mg  80 mg Oral QHS 80 mg at 03/08/22 2147    [Held by provider] furosemide (LASIX) tablet 20 mg  20 mg Oral DAILY 20 mg at 03/01/22 2568    gabapentin (NEURONTIN) capsule 400 mg  400 mg Oral  mg at 03/09/22 0820    [Held by provider] losartan (COZAAR) tablet 100 mg  100 mg Oral DAILY 100 mg at 03/01/22 0829    metoprolol tartrate (LOPRESSOR) tablet 50 mg  50 mg Oral BID 50 mg at 03/09/22 0820    oxyCODONE-acetaminophen (PERCOCET) 5-325 mg per tablet 1 Tablet  1 Tablet Oral Q4H PRN 1 Tablet at 03/07/22 0911    HYDROmorphone (DILAUDID) syringe 0.5-1 mg  0.5-1 mg IntraVENous Q2H PRN 0.5 mg at 03/09/22 0827    naloxone (NARCAN) injection 0.4 mg  0.4 mg IntraVENous PRN      metoprolol (LOPRESSOR) injection 5 mg  5 mg IntraVENous Q6H PRN 5 mg at 03/01/22 0731         Intake/Output Summary (Last 24 hours) at 3/9/2022 1049  Last data filed at 3/9/2022 1254  Gross per 24 hour   Intake 1080 ml   Output 1920 ml   Net -840 ml       Physical Exam:  General:  alert, cooperative, no distress, appears stated age  Neck:  no JVD  Lungs:  clear to auscultation bilaterally  Heart:  irregularly irregular rhythm  Abdomen:  abdomen is soft obese  Extremities:  trace edema LAKA    Visit Vitals  /80   Pulse 98   Temp 98.2 °F (36.8 °C)   Resp 12   Ht 5' 10\" (1.778 m)   Wt 177 lb 14.4 oz (80.7 kg)   SpO2 95%   BMI 25.53 kg/m²       Data Review:     Labs: Results:       Chemistry No results for input(s): GLU, NA, K, CL, CO2, BUN, CREA, CA, MG, PHOS, AGAP, BUCR, TBIL, AP, TP, ALB, GLOB, AGRAT in the last 72 hours. No lab exists for component: GPT   CBC w/Diff Recent Labs     03/07/22 0421   WBC 9.1   RBC 3.11*   HGB 9.2*   HCT 27.6*   *   GRANS 65   LYMPH 21   EOS 2      Cardiac Enzymes No results found for: CPK, CK, CKMMB, CKMB, RCK3, CKMBT, CKNDX, CKND1, LEO, TROPT, TROIQ, DEWAYNE, TROPT, TNIPOC, BNP, BNPP   Coagulation Recent Labs     03/07/22 0421   APTT 43.2*       Lipid Panel Lab Results   Component Value Date/Time    Cholesterol, total 83 12/15/2021 07:08 AM    HDL Cholesterol 22 (L) 12/15/2021 07:08 AM    LDL, calculated 43.2 12/15/2021 07:08 AM    VLDL, calculated 17.8 12/15/2021 07:08 AM    Triglyceride 89 12/15/2021 07:08 AM    CHOL/HDL Ratio 3.8 12/15/2021 07:08 AM      BNP No results found for: BNP, BNPP, XBNPT   Liver Enzymes No results for input(s): TP, ALB, TBIL, AP in the last 72 hours.     No lab exists for component: SGOT, GPT, DBIL   Thyroid Studies Lab Results   Component Value Date/Time    T4, Total 9.0 11/09/2017 10:28 AM    TSH 2.08 04/08/2021 07:05 AM          Signed By: JUNE Quick     March 9, 2022

## 2022-03-09 NOTE — PROGRESS NOTES
Nutrition Assessment     Type and Reason for Visit: Reassess,Positive nutrition screen,NPO/clear liquid    Nutrition Recommendations/Plan:   - No nutrition intervention indicated at this time. Will re-screen as appropriate. Nutrition Assessment:  Pt unavailable. Has good/ excellent po intake of meals. tolerating diet. S/p amputation of fourth and fifth toes and fourth and fifth metatarsal heads, right foot, with partial flap closure on 3/4    Malnutrition Assessment:  Malnutrition Status: Insufficient data     Estimated Daily Nutrient Needs:  Energy (kcal):  0719-7141  Protein (g):  64-96       Fluid (ml/day):  9076-6997    Nutrition Related Findings:  BM 3/9      Current Nutrition Therapies:  ADULT DIET Regular; No Coffee, Likes hot tea. NO oatmeal. No Tuna fish. Likes chicken evie salad.     Anthropometric Measures:  · Height:  5' 10\" (177.8 cm)  · Current Body Wt:  80.7 kg (177 lb 14.6 oz)  · BMI: 25.5    Nutrition Diagnosis:   · No nutrition diagnosis at this time         Nutrition Intervention:  Food and/or Nutrient Delivery: Continue current diet  Nutrition Education and Counseling: No recommendations at this time,Education not indicated  Coordination of Nutrition Care: Continue to monitor while inpatient    Goals:  PO nutrition intake will continue to meet >75% of patient's estimated nutrition needs within the next follow up date       Nutrition Monitoring and Evaluation:   Behavioral-Environmental Outcomes: None identified  Food/Nutrient Intake Outcomes: Food and nutrient intake  Physical Signs/Symptoms Outcomes: Biochemical data,Meal time behavior    Discharge Planning:    No discharge needs at this time     Electronically signed by Shaina Britt RD on 3/9/2022 at 5:49 PM    Contact Number: [phone]

## 2022-03-09 NOTE — REHAB NOTE
ARU/IPR REFERRAL CONTACT NOTE  [id number] [place] [address] for Physical Rehabilitation    RE:     Thank you for the opportunity to review this patient's case for admission to [id number] [place] [address] for Physical Rehabilitation. Based on our pre-admission screening:     [x ] This patient does not meet criteria for admission to [facility] for Physical        Rehabilitation due to:  Concur with PT recommendations of [facility] therapy  [x ] Too functional, per documentation, patient does not require both Physical and Occupational Therapy Services at an acute rehabilitation level of intensity. Again, Thank you for this referral. Should you have any questions please do not hesitate to call.      Sincerely,  Forest Chowdhury, CTRS

## 2022-03-09 NOTE — PROGRESS NOTES
Problem: Mobility Impaired (Adult and Pediatric)  Goal: *Acute Goals and Plan of Care (Insert Text)  Description: Physical Therapy Goals  Initiated 3/8/2022 and to be accomplished within 7 day(s)  1. Patient will move from supine to sit and sit to supine in bed with modified independence. 2.  Patient will transfer from bed to chair and chair to bed with modified independence using the least restrictive device. 3.  Patient will maintain WB precautions 80% throughout mobility. PLOF: Patient lives alone in single story home. He has prosthetic for left AKA. Also had RW and WC. Outcome: Progressing Towards Goal     PHYSICAL THERAPY TREATMENT    Patient: Funmi Cox (9 y.o. male)  Date: 3/9/2022  Diagnosis: Occlusion of right femoral-popliteal bypass graft ([facility] [address] [T82.898A] <principal problem not specified>  Procedure(s) (LRB):  AMPUTATION RIGHT FOURTH AND FIFTH TOES PARTIAL METATARSAL AMPUTATION (Right) 5 Days Post-Op  Precautions: Fall,Skin, R heel PWB    ASSESSMENT:  RN cleared for mobility. Pt found semi-reclined in bed, in NAD, willing to work with PT. He reports feeling good. Sup-sit Cecy. Pt edu on use of slide board 2/2 leg length discrepancy with p/o shoe on. Pt able to performed slide board transfer with SBA to w/c as well as back to bed. Pt also trialed transfer to w/c with no slide board well, SBA with good safety. Pt returned back sitting on EOB, recliner with drop arm brought next to bed. Pt able to transfer to chair with SBA, good compliance of PWB precautions. Edu opt on how to use drop arm BSC as well, RN made aware pt will need one for his room. Pt left sitting up in recliner with B Le's elevated, call bell nearby, all needs met.    Progression toward goals:   []      Improving appropriately and progressing toward goals  [x]      Improving slowly and progressing toward goals  []      Not making progress toward goals and plan of care will be adjusted     PLAN:  Patient continues to benefit from skilled intervention to address the above impairments. Continue treatment per established plan of care. Discharge Recommendations:  Home Health  Further Equipment Recommendations for Discharge:  Drop arm BSC     SUBJECTIVE:   Patient stated it is what it is.     OBJECTIVE DATA SUMMARY:   Critical Behavior:  Neurologic State: Alert  Orientation Level: Oriented X4  Cognition: Follows commands  Safety/Judgement: Fall prevention  Functional Mobility Training:  Bed Mobility:     Supine to Sit: Modified independent     Scooting: Modified independent         Transfers:              Bed to Chair: Stand-by assistance                    Balance:  Sitting: Intact; With support         Pain:  Pain level pre-treatment: 0/10  Pain level post-treatment: 0/10   Pain Intervention(s): Medication (see MAR); Rest, Ice, Repositioning   Response to intervention: Nurse notified, See doc flow    Activity Tolerance:   Pt tolerated mobility well  Please refer to the flowsheet for vital signs taken during this treatment. After treatment:   [x] Patient left in no apparent distress sitting up in chair  [] Patient left in no apparent distress in bed  [x] Call bell left within reach  [x] Nursing notified  [] Caregiver present  [] Bed alarm activated  [] SCDs applied      COMMUNICATION/EDUCATION:   [x]         Role of Physical Therapy in the acute care setting. [x]         Fall prevention education was provided and the patient/caregiver indicated understanding. [x]         Patient/family have participated as able in working toward goals and plan of care. []         Patient/family agree to work toward stated goals and plan of care. []         Patient understands intent and goals of therapy, but is neutral about his/her participation.   []         Patient is unable to participate in stated goals/plan of care: ongoing with therapy staff.  []         Other:        Santino Rosales   Time Calculation: 27 mins

## 2022-03-09 NOTE — PROGRESS NOTES
Being evaluated by physical therapy and occupational therapy today  ARU evaluation in progress  No new complaints  Appreciate cardiology input

## 2022-03-09 NOTE — PROGRESS NOTES
Bedside and Verbal shift change report given to Oleg Martino RN (oncoming nurse) by Bubba Clemente RN (offgoing nurse). Report included the following information SBAR, Kardex, ED Summary, Intake/Output, MAR, Recent Results, Med Rec Status and Cardiac Rhythm Afib.     1948: AOX4, denies pain or sob, dressing clean, dry and intact, shift assessment done, bed locked and low position, call bell within reach    2147: Due medication given    2350: sleeping comfortably, No changes from previous assessment    0130: Pt sleeping, call bell and urinal within reach    0355: No distress noted, no changes from previous assessment    0640: sleeping, call bell and urinal within reach    Bedside and Verbal shift change report given to Thai Alexander RN (oncoming nurse) by Oleg Martino RN (offgoing nurse). Report included the following information SBAR, Kardex, ED Summary, Procedure Summary, Intake/Output, MAR, Recent Results, Med Rec Status and Cardiac Rhythm Afib.

## 2022-03-09 NOTE — PROGRESS NOTES
Bedside, Verbal, and Written shift change report given to Gelacio Hernandez RN (oncoming nurse) by Radha Hamilton RN (offgoing nurse). Report included the following information SBAR, Kardex, Intake/Output, MAR, Recent Results, and Cardiac Rhythm AFIB.      1530 wound change complete

## 2022-03-09 NOTE — PROGRESS NOTES
Problem: Self Care Deficits Care Plan (Adult)  Goal: *Acute Goals and Plan of Care (Insert Text)  Description: Occupational Therapy Goals  Initiated 3/8/2022 within 7 day(s). 1.  Patient will perform bed>BSC using slide board while maintaining WB restrictions with modified independence. 2.  Patient will perform bed>w/c transfer using slide board while maintaining WB restructions in prep for bathroom mobility and self care participation with modified independence. 3.  Patient will perform all aspects of toileting with modified independence. 4.  Patient will participate in upper extremity therapeutic exercise/activities with modified independence for 10 minutes with there ex hand out and t band. Prior Level of Function:pt was Mi with ADLs and IADLs using w/c and RW. Pt with LLE AKA and prothesis. Outcome: Progressing Towards Goal  OCCUPATIONAL THERAPY TREATMENT    Patient: Toro Pierre (55 y.o. male)  Date: 3/9/2022  Diagnosis: Occlusion of right femoral-popliteal bypass graft ([facility] [address] [T82.898A] <principal problem not specified>  Procedure(s) (LRB):  AMPUTATION RIGHT FOURTH AND FIFTH TOES PARTIAL METATARSAL AMPUTATION (Right) 5 Days Post-Op  Precautions: Fall,Skin    Chart, occupational therapy assessment, plan of care, and goals were reviewed. ASSESSMENT:  Pt is sitting EOB with PT present, agreeable to OT joining the session to maximize pt's safety with functional txfrs while following PWB restrictions for R heel. Pt was able to don surgical shoe for RLE with Mod Ind in preparation for functional txfr training with sliding board. Pt educated on sliding board transfer strategies, following which pt txfred to the recliner with drop arm (simulating BSC txfr) with SBA. Pt demos good following of his WB restrictions for RLE during txfr. Following this pt also was able to utilize same technique for functional txfr back to bed with SBA w/o sliding board.  Pt was issued BSC with drop arm and educated on use for functional txfr to maximize his safety. Pt was able to perform txfr with good safety awareness, benefiting from assistance for steadying BSC due to design. Pt requesting to sit on [facility] for \"awhile\" due to need to have BM. Pt understands and agrees to call for assistance when finished. Pt's nurse notified. Call bell within reach. Progression toward goals:  []          Improving appropriately and progressing toward goals  [x]          Improving slowly and progressing toward goals  []          Not making progress toward goals and plan of care will be adjusted     PLAN:  Patient continues to benefit from skilled intervention to address the above impairments. Continue treatment per established plan of care. Discharge Recommendations:  Home Health  Further Equipment Recommendations for Discharge:  bedside commode with drop arm and wheelchair     SUBJECTIVE:   Patient stated I want to go home.     OBJECTIVE DATA SUMMARY:   Cognitive/Behavioral Status:  Neurologic State: Alert  Orientation Level: Oriented X4  Cognition: Follows commands  Safety/Judgement: Awareness of environment,Home safety    Functional Mobility and Transfers for ADLs:   Bed Mobility:     Supine to Sit: Modified independent     Scooting: Modified independent   Transfers:   Bed to Chair: Stand-by assistance   Toilet Transfer : Stand-by assistance (SPT to [facility])    Balance:  Sitting: Intact; With support    ADL Intervention:     Lower Body Dressing Assistance  Shoes with Velcro: Modified independent (surgical shoe for RLE)    Cognitive Retraining  Safety/Judgement: Awareness of environment;Home safety    Pain:  Pain level pre-treatment: 0/10   Pain level post-treatment: 0/10    Activity Tolerance:    Good  Please refer to the flowsheet for vital signs taken during this treatment. After treatment:   [x]  Patient left in no apparent distress sitting up on [facility], agrees to call for assistance when ready to return to chair.   []  Patient left in no apparent distress in bed  [x]  Call bell left within reach  [x]  Nursing notified  []  Caregiver present  []  Bed alarm activated    COMMUNICATION/EDUCATION:   [x] Role of Occupational Therapy in the acute care setting  [x] Home safety education was provided and the patient/caregiver indicated understanding. [x] Patient/family have participated as able in working towards goals and plan of care. [] Patient/family agree to work toward stated goals and plan of care. [] Patient understands intent and goals of therapy, but is neutral about his/her participation. [] Patient is unable to participate in goal setting and plan of care.       Thank you for this referral.  JESE Galloway/L  Time Calculation: 28 mins

## 2022-03-09 NOTE — PROGRESS NOTES
Notes reviewed from today  Patient did not meet criteria for inpatient rehab  We will discuss with team regarding home needs  Patient looks so much better today  We will establish home health and home physical therapy

## 2022-03-09 NOTE — PROGRESS NOTES
Problem: Pressure Injury - Risk of  Goal: *Prevention of pressure injury  Description: Document Efrem Scale and appropriate interventions in the flowsheet. Outcome: Progressing Towards Goal  Note: Pressure Injury Interventions:  Sensory Interventions: Maintain/enhance activity level,Check visual cues for pain,Minimize linen layers    Moisture Interventions: Absorbent underpads,Minimize layers    Activity Interventions: PT/OT evaluation    Mobility Interventions: HOB 30 degrees or less,Pressure redistribution bed/mattress (bed type)    Nutrition Interventions: Offer support with meals,snacks and hydration    Friction and Shear Interventions: HOB 30 degrees or less,Minimize layers                Problem: Patient Education: Go to Patient Education Activity  Goal: Patient/Family Education  Outcome: Progressing Towards Goal     Problem: Falls - Risk of  Goal: *Absence of Falls  Description: Document Antonio Fall Risk and appropriate interventions in the flowsheet.   Outcome: Progressing Towards Goal  Note: Fall Risk Interventions:  Mobility Interventions: Patient to call before getting OOB,Bed/chair exit alarm         Medication Interventions: Patient to call before getting OOB,Teach patient to arise slowly    Elimination Interventions: Patient to call for help with toileting needs,Call light in reach    History of Falls Interventions: Door open when patient unattended,Room close to nurse's station         Problem: Patient Education: Go to Patient Education Activity  Goal: Patient/Family Education  Outcome: Progressing Towards Goal

## 2022-03-09 NOTE — PROGRESS NOTES
Chart reviewed. Called Christal Briceno of [facility] to review pt's clinicals for possible placement following PT/OT recommendations. Per Christal Briceno of [facility], pt does not meet criteria for inpatient rehab. Met with pt and he stated he will not need home health . He stated he is very functional and can even change his wound dressing himself and he demonstrated for CM to see. Informed him we will see how it goes when he is ready for d/c.           ANUM BaN RN  Care Management  Pager: [phone]

## 2022-03-10 LAB
ANION GAP SERPL CALC-SCNC: 1 MMOL/L (ref 3–18)
BUN SERPL-MCNC: 13 MG/DL (ref 7–18)
BUN/CREAT SERPL: 13 (ref 12–20)
CALCIUM SERPL-MCNC: 9.5 MG/DL (ref 8.5–10.1)
CHLORIDE SERPL-SCNC: 107 MMOL/L (ref 100–111)
CO2 SERPL-SCNC: 29 MMOL/L (ref 21–32)
CREAT SERPL-MCNC: 0.97 MG/DL (ref 0.6–1.3)
GLUCOSE SERPL-MCNC: 94 MG/DL (ref 74–99)
POTASSIUM SERPL-SCNC: 4.2 MMOL/L (ref 3.5–5.5)
SODIUM SERPL-SCNC: 137 MMOL/L (ref 136–145)

## 2022-03-10 PROCEDURE — 36415 COLL VENOUS BLD VENIPUNCTURE: CPT

## 2022-03-10 PROCEDURE — 80048 BASIC METABOLIC PNL TOTAL CA: CPT

## 2022-03-10 PROCEDURE — 74011000250 HC RX REV CODE- 250: Performed by: SURGERY

## 2022-03-10 PROCEDURE — 97530 THERAPEUTIC ACTIVITIES: CPT

## 2022-03-10 PROCEDURE — 74011250637 HC RX REV CODE- 250/637: Performed by: PHYSICIAN ASSISTANT

## 2022-03-10 PROCEDURE — 74011250637 HC RX REV CODE- 250/637: Performed by: INTERNAL MEDICINE

## 2022-03-10 PROCEDURE — 65660000004 HC RM CVT STEPDOWN

## 2022-03-10 PROCEDURE — 74011250637 HC RX REV CODE- 250/637: Performed by: SURGERY

## 2022-03-10 RX ADMIN — APIXABAN 5 MG: 5 TABLET, FILM COATED ORAL at 08:40

## 2022-03-10 RX ADMIN — METOPROLOL TARTRATE 50 MG: 50 TABLET, FILM COATED ORAL at 08:42

## 2022-03-10 RX ADMIN — OXYCODONE AND ACETAMINOPHEN 2 TABLET: 5; 325 TABLET ORAL at 21:29

## 2022-03-10 RX ADMIN — DIGOXIN 0.25 MG: 250 TABLET ORAL at 08:41

## 2022-03-10 RX ADMIN — METOPROLOL TARTRATE 50 MG: 50 TABLET, FILM COATED ORAL at 17:33

## 2022-03-10 RX ADMIN — ASPIRIN 81 MG 81 MG: 81 TABLET ORAL at 08:40

## 2022-03-10 RX ADMIN — GABAPENTIN 400 MG: 400 CAPSULE ORAL at 08:41

## 2022-03-10 RX ADMIN — OXYCODONE AND ACETAMINOPHEN 2 TABLET: 5; 325 TABLET ORAL at 08:46

## 2022-03-10 RX ADMIN — OXYCODONE AND ACETAMINOPHEN 2 TABLET: 5; 325 TABLET ORAL at 13:48

## 2022-03-10 RX ADMIN — SODIUM CHLORIDE, PRESERVATIVE FREE 10 ML: 5 INJECTION INTRAVENOUS at 13:48

## 2022-03-10 RX ADMIN — GABAPENTIN 400 MG: 400 CAPSULE ORAL at 21:25

## 2022-03-10 RX ADMIN — ATORVASTATIN CALCIUM 80 MG: 40 TABLET, FILM COATED ORAL at 21:25

## 2022-03-10 RX ADMIN — LOSARTAN POTASSIUM 25 MG: 25 TABLET, FILM COATED ORAL at 08:41

## 2022-03-10 RX ADMIN — OXYCODONE AND ACETAMINOPHEN 2 TABLET: 5; 325 TABLET ORAL at 00:07

## 2022-03-10 RX ADMIN — DILTIAZEM HYDROCHLORIDE 240 MG: 240 CAPSULE, COATED, EXTENDED RELEASE ORAL at 08:41

## 2022-03-10 RX ADMIN — SODIUM CHLORIDE, PRESERVATIVE FREE 10 ML: 5 INJECTION INTRAVENOUS at 05:36

## 2022-03-10 RX ADMIN — SODIUM CHLORIDE, PRESERVATIVE FREE 10 ML: 5 INJECTION INTRAVENOUS at 21:26

## 2022-03-10 RX ADMIN — APIXABAN 5 MG: 5 TABLET, FILM COATED ORAL at 17:33

## 2022-03-10 RX ADMIN — CETIRIZINE HYDROCHLORIDE 10 MG: 10 TABLET, FILM COATED ORAL at 08:40

## 2022-03-10 RX ADMIN — GABAPENTIN 400 MG: 400 CAPSULE ORAL at 16:51

## 2022-03-10 NOTE — PROGRESS NOTES
Problem: Pressure Injury - Risk of  Goal: *Prevention of pressure injury  Description: Document Efrem Scale and appropriate interventions in the flowsheet. Outcome: Progressing Towards Goal  Note: Pressure Injury Interventions:  Sensory Interventions: Float heels,Maintain/enhance activity level,Minimize linen layers,Pressure redistribution bed/mattress (bed type),Turn and reposition approx. every two hours (pillows and wedges if needed),Use 30-degree side-lying position    Moisture Interventions: Absorbent underpads,Minimize layers    Activity Interventions: Pressure redistribution bed/mattress(bed type)    Mobility Interventions: Float heels,HOB 30 degrees or less,Pressure redistribution bed/mattress (bed type)    Nutrition Interventions: Document food/fluid/supplement intake    Friction and Shear Interventions: HOB 30 degrees or less,Minimize layers                Problem: Patient Education: Go to Patient Education Activity  Goal: Patient/Family Education  Outcome: Progressing Towards Goal     Problem: Falls - Risk of  Goal: *Absence of Falls  Description: Document Antonio Fall Risk and appropriate interventions in the flowsheet.   Outcome: Progressing Towards Goal  Note: Fall Risk Interventions:  Mobility Interventions: Communicate number of staff needed for ambulation/transfer,Patient to call before getting OOB         Medication Interventions: Patient to call before getting OOB    Elimination Interventions: Call light in reach,Patient to call for help with toileting needs,Urinal in reach    History of Falls Interventions: Door open when patient unattended,Room close to nurse's station         Problem: Patient Education: Go to Patient Education Activity  Goal: Patient/Family Education  Outcome: Progressing Towards Goal     Problem: Pain  Goal: *Control of Pain  Outcome: Progressing Towards Goal     Problem: Patient Education: Go to Patient Education Activity  Goal: Patient/Family Education  Outcome: Progressing Towards Goal     Problem: Afib: Discharge Outcomes (not in CAT)  Goal: *Hemodynamically stable  Outcome: Progressing Towards Goal  Goal: *Stable cardiac rhythm  Outcome: Progressing Towards Goal  Goal: *Lungs clear or at baseline  Outcome: Progressing Towards Goal  Goal: *Optimal pain control at patient's stated goal  Outcome: Progressing Towards Goal  Goal: *Identifies cardiac risk factors  Outcome: Progressing Towards Goal  Goal: *Verbalizes home exercise program, activity guidelines, cardiac precautions  Outcome: Progressing Towards Goal  Goal: *Verbalizes understanding and describes prescribed diet  Outcome: Progressing Towards Goal  Goal: *Verbalizes understanding and describes medication purposes and frequencies  Outcome: Progressing Towards Goal  Goal: *Anxiety reduced or absent  Outcome: Progressing Towards Goal  Goal: *Understands and describes signs and symptoms to report to providers(Stroke Metric)  Outcome: Progressing Towards Goal  Goal: *Describes follow-up/return visits to physicians  Outcome: Progressing Towards Goal  Goal: *Describes available resources and support systems  Outcome: Progressing Towards Goal  Goal: *Influenza immunization  Outcome: Progressing Towards Goal  Goal: *Pneumococcal immunization  Outcome: Progressing Towards Goal  Goal: *Describes smoking cessation resources  Outcome: Progressing Towards Goal

## 2022-03-10 NOTE — PROGRESS NOTES
0800:  Report received, care assumed. In bed. Complete assessment performed. AAOx4. Right groin and lateral leg incision c/d/i, open to air. Right foot wound with dressing from 3/9/22 c/d/i, due to change tomorrow 3/11/22. Breakfast served. Monitor. Stable shift today. Has been in recliner with dressing to right heel, heel supported in prevalon boot. Now resting in bed. Refuses offer for pain medication. Watching TV. Shift change report given to BEAU Khoury with bedside rounds.

## 2022-03-10 NOTE — ROUTINE PROCESS
1923:Bedside and Verbal shift change report received from Elvia Harris (offgoing nurse). Report included the following information SBAR, Kardex, Intake/Output, MAR, Recent Results and Cardiac Rhythm Afib. Quietly resting in bed. HOB elevated. No SOB on RA. Rt foot dressing CD&I. Call light within reach. 1941: Medicated for pain per patient request.     2115: Due meds given. 0000: No change from previous assessment. 0007: Medicated for pain per patient request.     0400: No change from previous assessment. 0536: Slept good thru night. Needs attended. 0730: Bedside and Verbal shift change report given to [address] [place] [address] (oncoming nurse) by me (offgoing nurse). Report included the following information SBAR, Kardex, Intake/Output, MAR, Recent Results and Cardiac Rhythm Afib .

## 2022-03-10 NOTE — PROGRESS NOTES
Discussed with patient today. Has been able to slide from bed to bedside commode, to bedside chair.   No real ambulation yet but his effort is good  He has got no complaints of any pain at this point  Is being careful with the surgical foot  We will discussed with Dr. Estrella Eaton  Noted that he was not approved for rehab, was hoping for this  We will have to ensure he is safe for going home, not ready for independence yet

## 2022-03-10 NOTE — PROGRESS NOTES
Problem: Mobility Impaired (Adult and Pediatric)  Goal: *Acute Goals and Plan of Care (Insert Text)  Description: Physical Therapy Goals  Initiated 3/8/2022 and to be accomplished within 7 day(s)  1. Patient will move from supine to sit and sit to supine in bed with modified independence. 2.  Patient will transfer from bed to chair and chair to bed with modified independence using the least restrictive device. 3.  Patient will maintain WB precautions 80% throughout mobility. PLOF: Patient lives alone in single story home. He has prosthetic for left AKA. Also had RW and WC. Outcome: Progressing Towards Goal     PHYSICAL THERAPY TREATMENT    Patient: Pedrito Chau (84 y.o. 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 PLOF: see above     ASSESSMENT:  Pt received in bed in NaD and agreeable. Pt endorses need to use the bathroom and able to utilize drop arm BSC for squat pivot transfer bed <> BSC with SBA, mod ind for pericare. Pt then completes squat pivot with drop arm recliner to facilitate OOB activity. Pt is expressing wanting to be able to ambulate. At this time, pt limited to transfers only as when he dons his prosthetic on L AKA and surgical shoe on the RLE, there is a leg length discrepancy to where he cannot maintain NWB to R forefoot and ambulate safely and  this is why he has been doing transfers only. Pt does not have adequate DF to be able to hop on his RLE on the heel without his prosthetic either. So pt agreeable to donning surgical shoe on the prosthetic (if it can be safely held in place) to hopefully even out leg lengths to match the RLE to be able to safely ambulate- pt agreeable to trying this tomorrow.      Pt left up in chair with all needs met and call bell within reach, will continue to follow per POC. Progression toward goals:   [x]      Improving appropriately and progressing toward goals  []      Improving slowly and progressing toward goals  []      Not making progress toward goals and plan of care will be adjusted     PLAN:  Patient continues to benefit from skilled intervention to address the above impairments. Continue treatment per established plan of care. Discharge Recommendations:  Home Health with assist as needed   Further Equipment Recommendations for Discharge:  N/A at this time     SUBJECTIVE:   Patient stated I want to be able to walk.     OBJECTIVE DATA SUMMARY:   Critical Behavior:  Neurologic State: Alert,Eyes open spontaneously  Orientation Level: Oriented X4  Cognition: Follows commands  Safety/Judgement: Awareness of environment,Home safety  Functional Mobility Training:  Bed Mobility:     Supine to Sit: Modified independent     Scooting: Modified independent         Transfers:              Bed to Chair: Stand-by assistance (bed <> BSC, bed <> recliner via squat pivot )             Balance:  Sitting: Intact; With support      Pain:  Pain level pre-treatment: 0/10  Pain level post-treatment: 0/10   Pain Intervention(s): Medication (see MAR); Rest, Ice, Repositioning   Response to intervention: Nurse notified    Activity Tolerance:   Good    Please refer to the flowsheet for vital signs taken during this treatment. After treatment:   [x] Patient left in no apparent distress sitting up in chair  [] Patient left in no apparent distress in bed  [x] Call bell left within reach  [x] Nursing notified  [] Caregiver present  [] Bed alarm activated  [] SCDs applied      COMMUNICATION/EDUCATION:   [x]         Role of Physical Therapy in the acute care setting. [x]         Fall prevention education was provided and the patient/caregiver indicated understanding.   [x]         Patient/family have participated as able in working toward goals and plan of care. [x]         Patient/family agree to work toward stated goals and plan of care. []         Patient understands intent and goals of therapy, but is neutral about his/her participation.   []         Patient is unable to participate in stated goals/plan of care: ongoing with therapy staff.  []         Other:        [facility]   Time Calculation: 23 mins

## 2022-03-10 NOTE — PROGRESS NOTES
Discussed with pt. He is in agreement with home health for PT/OT  Discussed with pt's nurse Callie Charlesr. Pt will also need home health for wound care.           CHRISTOPHER Leiva RN  Care Management  Pager: [phone]

## 2022-03-11 ENCOUNTER — HOME HEALTH ADMISSION (OUTPATIENT)
Dept: HOME HEALTH SERVICES | Facility: HOME HEALTH | Age: 62
End: 2022-03-11
Payer: COMMERCIAL

## 2022-03-11 LAB — DIGOXIN SERPL-MCNC: 1 NG/ML (ref 0.9–2)

## 2022-03-11 PROCEDURE — 36415 COLL VENOUS BLD VENIPUNCTURE: CPT

## 2022-03-11 PROCEDURE — 65660000004 HC RM CVT STEPDOWN

## 2022-03-11 PROCEDURE — 97116 GAIT TRAINING THERAPY: CPT

## 2022-03-11 PROCEDURE — 2709999900 HC NON-CHARGEABLE SUPPLY

## 2022-03-11 PROCEDURE — 74011250637 HC RX REV CODE- 250/637: Performed by: INTERNAL MEDICINE

## 2022-03-11 PROCEDURE — 74011250637 HC RX REV CODE- 250/637: Performed by: SURGERY

## 2022-03-11 PROCEDURE — 74011000250 HC RX REV CODE- 250: Performed by: SURGERY

## 2022-03-11 PROCEDURE — 97535 SELF CARE MNGMENT TRAINING: CPT

## 2022-03-11 PROCEDURE — 97530 THERAPEUTIC ACTIVITIES: CPT

## 2022-03-11 PROCEDURE — 74011250637 HC RX REV CODE- 250/637: Performed by: PHYSICIAN ASSISTANT

## 2022-03-11 PROCEDURE — 80162 ASSAY OF DIGOXIN TOTAL: CPT

## 2022-03-11 RX ADMIN — OXYCODONE AND ACETAMINOPHEN 2 TABLET: 5; 325 TABLET ORAL at 19:58

## 2022-03-11 RX ADMIN — GABAPENTIN 400 MG: 400 CAPSULE ORAL at 21:16

## 2022-03-11 RX ADMIN — LOSARTAN POTASSIUM 25 MG: 25 TABLET, FILM COATED ORAL at 08:12

## 2022-03-11 RX ADMIN — METOPROLOL TARTRATE 50 MG: 50 TABLET, FILM COATED ORAL at 17:23

## 2022-03-11 RX ADMIN — METOPROLOL TARTRATE 50 MG: 50 TABLET, FILM COATED ORAL at 08:12

## 2022-03-11 RX ADMIN — CETIRIZINE HYDROCHLORIDE 10 MG: 10 TABLET, FILM COATED ORAL at 08:12

## 2022-03-11 RX ADMIN — OXYCODONE AND ACETAMINOPHEN 2 TABLET: 5; 325 TABLET ORAL at 08:12

## 2022-03-11 RX ADMIN — APIXABAN 5 MG: 5 TABLET, FILM COATED ORAL at 08:12

## 2022-03-11 RX ADMIN — GABAPENTIN 400 MG: 400 CAPSULE ORAL at 08:12

## 2022-03-11 RX ADMIN — DIGOXIN 0.25 MG: 250 TABLET ORAL at 08:13

## 2022-03-11 RX ADMIN — ATORVASTATIN CALCIUM 80 MG: 40 TABLET, FILM COATED ORAL at 21:16

## 2022-03-11 RX ADMIN — DILTIAZEM HYDROCHLORIDE 240 MG: 240 CAPSULE, COATED, EXTENDED RELEASE ORAL at 08:12

## 2022-03-11 RX ADMIN — SODIUM CHLORIDE, PRESERVATIVE FREE 10 ML: 5 INJECTION INTRAVENOUS at 21:18

## 2022-03-11 RX ADMIN — APIXABAN 5 MG: 5 TABLET, FILM COATED ORAL at 17:23

## 2022-03-11 RX ADMIN — GABAPENTIN 400 MG: 400 CAPSULE ORAL at 15:59

## 2022-03-11 RX ADMIN — SODIUM CHLORIDE, PRESERVATIVE FREE 10 ML: 5 INJECTION INTRAVENOUS at 13:47

## 2022-03-11 RX ADMIN — ASPIRIN 81 MG 81 MG: 81 TABLET ORAL at 08:12

## 2022-03-11 NOTE — PROGRESS NOTES
Problem: Mobility Impaired (Adult and Pediatric)  Goal: *Acute Goals and Plan of Care (Insert Text)  Description: Physical Therapy Goals  Initiated 3/8/2022 and to be accomplished within 7 day(s)  1. Patient will move from supine to sit and sit to supine in bed with modified independence. 2.  Patient will transfer from bed to chair and chair to bed with modified independence using the least restrictive device. 3.  Patient will maintain WB precautions 80% throughout mobility. PLOF: Patient lives alone in single story home. He has prosthetic for left AKA. Also had RW and WC. Outcome: Progressing Towards Goal     Problem: Mobility Impaired (Adult and Pediatric)  Goal: *Acute Goals and Plan of Care (Insert Text)  Description: Physical Therapy Goals  Initiated 3/8/2022 and to be accomplished within 7 day(s)  1. Patient will move from supine to sit and sit to supine in bed with modified independence. 2.  Patient will transfer from bed to chair and chair to bed with modified independence using the least restrictive device. 3.  Patient will maintain WB precautions 80% throughout mobility. PLOF: Patient lives alone in single story home. He has prosthetic for left AKA. Also had RW and WC. Outcome: Progressing Towards Goal     PHYSICAL THERAPY TREATMENT    Patient: Scotty James (4 y.o. 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 This PT donned post-op shoe to L prosthetic securely and noted pt to don leg with mod ind however needing assist with donning RLE post op shoe.  Pt stands to walker and is able to amb 15 ft x 2, pt with difficulty keeping R forefoot completely off the ground due to lack of DF however pt making his best effort, is pleased at being able to walk- noted even leg lengths with post op shoes on both LE, no instability noted of post op shoe on prosthetic when ambulating. Pt fatigued after 2 short walks to/from sinks this date as pt reports he has not been in his prosthetic in the last few days. Pt is positioned for comfort up in recliner with all needs met and call bell within reach, LE elevated, will continue to follow per POC. Recommend HH with family support as needed. Progression toward goals:   [x]      Improving appropriately and progressing toward goals  []      Improving slowly and progressing toward goals  []      Not making progress toward goals and plan of care will be adjusted     PLAN:  Patient continues to benefit from skilled intervention to address the above impairments. Continue treatment per established plan of care. Discharge Recommendations:  [place] with family support as needed   Further Equipment Recommendations for Discharge:  rolling walker, wc (pt owns)      SUBJECTIVE:   Patient stated Simon Bump will try whatever you need met to.     OBJECTIVE DATA SUMMARY:   Critical Behavior:  Neurologic State: Alert,Appropriate for age  Orientation Level: Oriented X4  Cognition: Follows commands  Safety/Judgement: Awareness of environment,Home safety  Functional Mobility Training:  Bed Mobility:     Supine to Sit: Modified independent     Scooting: Modified independent         Transfers:  Sit to Stand: Minimum assistance;Stand-by assistance (min A for first stand from EOB then SBA )  Stand to Sit: Stand-by assistance             Balance:  Sitting: Intact  Standing: Impaired; With support  Standing - Static: Good  Standing - Dynamic : Fair        Ambulation/Gait Training:  Distance (ft): 15 Feet (ft) (x2)  Assistive Device: Walker, rolling  Ambulation - Level of Assistance: Stand-by assistance        Gait Abnormalities: Decreased step clearance    Speed/Suzanna: Slow  Step Length: Right shortened;Left shortened    Pain:  Pain level pre-treatment: 0/10  Pain level post-treatment: 0/10   Pain Intervention(s): Medication (see MAR); Rest, Ice, Repositioning   Response to intervention: Nurse notified    Activity Tolerance:   Good    Please refer to the flowsheet for vital signs taken during this treatment. After treatment:   [] Patient left in no apparent distress sitting up in chair  [x] Patient left in no apparent distress in bed  [x] Call bell left within reach  [x] Nursing notified  [] Caregiver present  [] Bed alarm activated  [] SCDs applied      COMMUNICATION/EDUCATION:   [x]         Role of Physical Therapy in the acute care setting. [x]         Fall prevention education was provided and the patient/caregiver indicated understanding. [x]         Patient/family have participated as able in working toward goals and plan of care. [x]         Patient/family agree to work toward stated goals and plan of care. []         Patient understands intent and goals of therapy, but is neutral about his/her participation.   []         Patient is unable to participate in stated goals/plan of care: ongoing with therapy staff.  []         Other:        Parul Lu   Time Calculation: 27 mins

## 2022-03-11 NOTE — PROGRESS NOTES
Problem: Self Care Deficits Care Plan (Adult)  Goal: *Acute Goals and Plan of Care (Insert Text)  Description: Occupational Therapy Goals  Initiated 3/8/2022 within 7 day(s). 1.  Patient will perform bed>BSC using slide board while maintaining WB restrictions with modified independence. 2.  Patient will perform bed>w/c transfer using slide board while maintaining WB restructions in prep for bathroom mobility and self care participation with modified independence. 3.  Patient will perform all aspects of toileting with modified independence. 4.  Patient will participate in upper extremity therapeutic exercise/activities with modified independence for 10 minutes with there ex hand out and t band. Prior Level of Function:pt was Mi with ADLs and IADLs using w/c and RW. Pt with LLE AKA and prothesis. Outcome: Progressing Towards Goal   OCCUPATIONAL THERAPY TREATMENT    Patient: Scotty James (43 y.o. male)  Date: 3/11/2022  Diagnosis: Occlusion of right femoral-popliteal bypass graft ([facility] [address] [T82.898A] <principal problem not specified>  Procedure(s) (LRB):  AMPUTATION RIGHT FOURTH AND FIFTH TOES PARTIAL METATARSAL AMPUTATION (Right) 7 Days Post-Op  Precautions: Fall,Skin  PLOF: pt was Mi with ADLs and IADLs using w/c and RW. Pt with LLE AKA and prothesis. Chart, occupational therapy assessment, plan of care, and goals were reviewed. ASSESSMENT:  Pt cleared by RN for OT tx at this time. PT co tx to maximize pt safety and participation. Pt presented supine in bed upon therapist arrival and motivated for participation. Pt came to EOB from supine with MOD I and donned R post op shoe and L prosthetic MOD I. Pt stood with RW and maneuvered to sink ~ 15 ft w/ SBA. Pt performed oral care and facial hygiene w/ SBA tolerating standing ~ 3 mins. Pt returned back to reclining chair at end of session w/ SBA and RW. Pt was left with IWONA Michelle, RN present, and all needs left within reach. Progression toward goals:  []          Improving appropriately and progressing toward goals  [x]          Improving slowly and progressing toward goals  []          Not making progress toward goals and plan of care will be adjusted     PLAN:  Patient continues to benefit from skilled intervention to address the above impairments. Continue treatment per established plan of care. Discharge Recommendations:  [facility] with increased family support  Further Equipment Recommendations for Discharge:  RW     SUBJECTIVE:   Patient stated  Thank you ladies. \"     OBJECTIVE DATA SUMMARY:   Cognitive/Behavioral Status:  Neurologic State: Alert,Appropriate for age  Orientation Level: Oriented X4  Cognition: Follows commands  Safety/Judgement: Awareness of environment,Home safety    Functional Mobility and Transfers for ADLs:   Bed Mobility:     Supine to Sit: Modified independent     Scooting: Modified independent   Transfers:  Sit to Stand: Minimum assistance;Stand-by assistance (min A for first stand from EOB then SBA )  Stand to Sit: Stand-by assistance    Balance:  Sitting: Intact  Standing: Impaired; With support  Standing - Static: Good  Standing - Dynamic : Fair    ADL Intervention:       Grooming  Position Performed: Standing  Washing Face: Stand-by assistance  Brushing Teeth: Stand-by assistance       Lower Body Dressing Assistance  Shoes with Velcro: Modified independent (R post op shoe)    Pain:  Pain level pre-treatment: 0/10   Pain level post-treatment: 0/10    Activity Tolerance:    Good  Please refer to the flowsheet for vital signs taken during this treatment.   After treatment:   [x]  Patient left in no apparent distress sitting up in chair  []  Patient left in no apparent distress in bed  [x]  Call bell left within reach  [x]  Nursing notified  []  Caregiver present  []  Bed alarm activated    COMMUNICATION/EDUCATION:   [x] Role of Occupational Therapy in the acute care setting  [x] Home safety education was provided and the patient/caregiver indicated understanding. [x] Patient/family have participated as able in working towards goals and plan of care. [x] Patient/family agree to work toward stated goals and plan of care. [] Patient understands intent and goals of therapy, but is neutral about his/her participation. [] Patient is unable to participate in goal setting and plan of care.       Thank you for this referral.  DARSHANA Mcgill  Time Calculation: 27 mins

## 2022-03-11 NOTE — HOME CARE
HH orders received for PT/OT from Dr. Susan Garrett. Perfect served to inquire Dr Sparks Form to continue current wound care order of Cleanse wound and pack with Aqucel ag and DSD three times per week. Awaiting response. Spoke with patient in room;  patient identifiers verified. Explained home care services and routines. Demographics verified including insurance, phone and address confirmed. Caregivers available, none at this time. Patient states, he lives alone and family available not often. He also states he has been caring for self and able to learn and change dressings by self. Referral has been initiated and awaiting for updated orders for completion.      ---   Narda Blas LPN  [facility] - [facility] Liaison

## 2022-03-11 NOTE — PROGRESS NOTES
Patient is working with physical therapy occupational therapy  Made attempts at standing on foot. Patient has contralateral leg above-knee amputation  Patient was felt to be too high function for rehab so we will continue rehab as inpatient in the hospital telemetry stable and ambulatory. Patient lives by himself and is very independent. Bypass remains patent wounds are intact. Podiatry surgery will follow regarding foot.

## 2022-03-11 NOTE — PROGRESS NOTES
Sent text message to Dr. Maeve Rivers for home health orders. 1218: Home health orders sent to [address] [place] [address] was notified.         ANUM ReidN RN  Care Management  Pager: [phone]

## 2022-03-11 NOTE — PROGRESS NOTES
0800:  Report received, care assumed. In bed. Complete assessment performed. AAOx4. Right foot dressing c/d/i. C/o pain, see flowsheet and mar for details. 1030:  PT and OT providers assisting OOB to recliner when right foot dressing became wet with drainage. Dressing removed by Scarlet Garza RN who relayed to this RN approx 5cc serosang fluid was noted at site. Wound care performed to site as ordered with wound cleanser, Aquacel Ag rope, then sterile dry dressing applied secured with harriet gauze. Site with sutures intact, area prox of incision line near great toe area remains open, edematous. Tolerated well. Foot elevated. Dr Wilmon Salon called to notify, message left with his office staff for return call. 1330:  Assisted back to bed.  1400:  Message left with Dr Radha Gillespie answering service for return call to inform of surgical site bleeding this morning as noted above. 1545:  Sonia notified bleeding right foot wound as noted from this morning with activity. 1600:  No return call from Dr Radha Gillespie. Message for return call left with his answering service. 1900:  Shift change report given to BEAU Khoury with bedside rounds.

## 2022-03-11 NOTE — ROUTINE PROCESS
1915:Bedside and Verbal shift change report received from [facility] nurse). Report included the following information SBAR, Kardex, Intake/Output, MAR, Recent Results and Cardiac Rhythm Afib. Quietly resting in bed. Pain level is tolerable at this time & at his goal. Call light within reach    2129: Due meds given. Medicated for pain per patient request.     3820: No change from previous assessment. 3294: No change from previous assessment. 0600: Slept good thru night. Needs attended. 0710:Bedside and Verbal shift change report given to [address] [place] [address] (oncoming nurse) by me (offgoing nurse). Report included the following information SBAR, Kardex, Intake/Output, MAR, Recent Results and Cardiac Rhythm Afib.

## 2022-03-12 LAB
ANION GAP SERPL CALC-SCNC: 7 MMOL/L (ref 3–18)
BASOPHILS # BLD: 0.1 K/UL (ref 0–0.1)
BASOPHILS NFR BLD: 1 % (ref 0–2)
BUN SERPL-MCNC: 17 MG/DL (ref 7–18)
BUN/CREAT SERPL: 19 (ref 12–20)
CALCIUM SERPL-MCNC: 9.4 MG/DL (ref 8.5–10.1)
CHLORIDE SERPL-SCNC: 108 MMOL/L (ref 100–111)
CO2 SERPL-SCNC: 23 MMOL/L (ref 21–32)
CREAT SERPL-MCNC: 0.88 MG/DL (ref 0.6–1.3)
DIFFERENTIAL METHOD BLD: ABNORMAL
EOSINOPHIL # BLD: 0.4 K/UL (ref 0–0.4)
EOSINOPHIL NFR BLD: 4 % (ref 0–5)
ERYTHROCYTE [DISTWIDTH] IN BLOOD BY AUTOMATED COUNT: 16 % (ref 11.6–14.5)
GLUCOSE SERPL-MCNC: 91 MG/DL (ref 74–99)
HCT VFR BLD AUTO: 34.2 % (ref 36–48)
HGB BLD-MCNC: 11 G/DL (ref 13–16)
IMM GRANULOCYTES # BLD AUTO: 0.1 K/UL (ref 0–0.04)
IMM GRANULOCYTES NFR BLD AUTO: 0 % (ref 0–0.5)
LYMPHOCYTES # BLD: 1.9 K/UL (ref 0.9–3.6)
LYMPHOCYTES NFR BLD: 16 % (ref 21–52)
MCH RBC QN AUTO: 29.1 PG (ref 24–34)
MCHC RBC AUTO-ENTMCNC: 32.2 G/DL (ref 31–37)
MCV RBC AUTO: 90.5 FL (ref 78–100)
MONOCYTES # BLD: 1.3 K/UL (ref 0.05–1.2)
MONOCYTES NFR BLD: 11 % (ref 3–10)
NEUTS SEG # BLD: 7.7 K/UL (ref 1.8–8)
NEUTS SEG NFR BLD: 68 % (ref 40–73)
NRBC # BLD: 0 K/UL (ref 0–0.01)
NRBC BLD-RTO: 0 PER 100 WBC
PLATELET # BLD AUTO: 614 K/UL (ref 135–420)
PMV BLD AUTO: 9.1 FL (ref 9.2–11.8)
POTASSIUM SERPL-SCNC: 4.6 MMOL/L (ref 3.5–5.5)
RBC # BLD AUTO: 3.78 M/UL (ref 4.35–5.65)
SODIUM SERPL-SCNC: 138 MMOL/L (ref 136–145)
WBC # BLD AUTO: 11.4 K/UL (ref 4.6–13.2)

## 2022-03-12 PROCEDURE — 74011250637 HC RX REV CODE- 250/637: Performed by: PHYSICIAN ASSISTANT

## 2022-03-12 PROCEDURE — 80048 BASIC METABOLIC PNL TOTAL CA: CPT

## 2022-03-12 PROCEDURE — 2709999900 HC NON-CHARGEABLE SUPPLY

## 2022-03-12 PROCEDURE — 74011250637 HC RX REV CODE- 250/637: Performed by: INTERNAL MEDICINE

## 2022-03-12 PROCEDURE — 74011250636 HC RX REV CODE- 250/636: Performed by: SURGERY

## 2022-03-12 PROCEDURE — 65660000004 HC RM CVT STEPDOWN

## 2022-03-12 PROCEDURE — 85025 COMPLETE CBC W/AUTO DIFF WBC: CPT

## 2022-03-12 PROCEDURE — 74011250637 HC RX REV CODE- 250/637: Performed by: SURGERY

## 2022-03-12 PROCEDURE — 74011000250 HC RX REV CODE- 250: Performed by: SURGERY

## 2022-03-12 PROCEDURE — 36415 COLL VENOUS BLD VENIPUNCTURE: CPT

## 2022-03-12 RX ADMIN — APIXABAN 5 MG: 5 TABLET, FILM COATED ORAL at 08:52

## 2022-03-12 RX ADMIN — SODIUM CHLORIDE, PRESERVATIVE FREE 10 ML: 5 INJECTION INTRAVENOUS at 13:31

## 2022-03-12 RX ADMIN — GABAPENTIN 400 MG: 400 CAPSULE ORAL at 16:03

## 2022-03-12 RX ADMIN — CETIRIZINE HYDROCHLORIDE 10 MG: 10 TABLET, FILM COATED ORAL at 08:52

## 2022-03-12 RX ADMIN — OXYCODONE AND ACETAMINOPHEN 2 TABLET: 5; 325 TABLET ORAL at 16:03

## 2022-03-12 RX ADMIN — METOPROLOL TARTRATE 50 MG: 50 TABLET, FILM COATED ORAL at 08:52

## 2022-03-12 RX ADMIN — OXYCODONE AND ACETAMINOPHEN 2 TABLET: 5; 325 TABLET ORAL at 08:57

## 2022-03-12 RX ADMIN — DOCUSATE SODIUM 100 MG: 100 CAPSULE, LIQUID FILLED ORAL at 17:20

## 2022-03-12 RX ADMIN — SODIUM CHLORIDE, PRESERVATIVE FREE 10 ML: 5 INJECTION INTRAVENOUS at 21:01

## 2022-03-12 RX ADMIN — ASPIRIN 81 MG 81 MG: 81 TABLET ORAL at 08:51

## 2022-03-12 RX ADMIN — DIGOXIN 0.25 MG: 250 TABLET ORAL at 08:51

## 2022-03-12 RX ADMIN — POLYETHYLENE GLYCOL 3350 17 G: 17 POWDER, FOR SOLUTION ORAL at 21:01

## 2022-03-12 RX ADMIN — DOCUSATE SODIUM 100 MG: 100 CAPSULE, LIQUID FILLED ORAL at 08:52

## 2022-03-12 RX ADMIN — DILTIAZEM HYDROCHLORIDE 240 MG: 240 CAPSULE, COATED, EXTENDED RELEASE ORAL at 08:51

## 2022-03-12 RX ADMIN — APIXABAN 5 MG: 5 TABLET, FILM COATED ORAL at 17:20

## 2022-03-12 RX ADMIN — GABAPENTIN 400 MG: 400 CAPSULE ORAL at 08:52

## 2022-03-12 RX ADMIN — LOSARTAN POTASSIUM 25 MG: 25 TABLET, FILM COATED ORAL at 08:52

## 2022-03-12 RX ADMIN — METOPROLOL TARTRATE 50 MG: 50 TABLET, FILM COATED ORAL at 17:20

## 2022-03-12 RX ADMIN — ATORVASTATIN CALCIUM 80 MG: 40 TABLET, FILM COATED ORAL at 21:01

## 2022-03-12 RX ADMIN — GABAPENTIN 400 MG: 400 CAPSULE ORAL at 21:01

## 2022-03-12 RX ADMIN — HYDROMORPHONE HYDROCHLORIDE 1 MG: 1 INJECTION, SOLUTION INTRAMUSCULAR; INTRAVENOUS; SUBCUTANEOUS at 19:41

## 2022-03-12 RX ADMIN — SODIUM CHLORIDE, PRESERVATIVE FREE 10 ML: 5 INJECTION INTRAVENOUS at 06:29

## 2022-03-12 NOTE — PROGRESS NOTES
Progressing with therapy  Wounds are all intact  Feels better and better  Getting stronger able to get further distance.   Transferring from bed to chair to potty  Anticipate discharge sometime this week if progressing with mobility

## 2022-03-12 NOTE — ROUTINE PROCESS
1922:Bedside and Verbal shift change report received from Miguel Holman (offgoing nurse). Report included the following information SBAR, Kardex, Intake/Output, MAR, Recent Results and Cardiac Rhythm Afib. Quietly resting in bed. Rt foot dressing CDI. Call light within reach. 1958: Medicated for pain per patient request.     2116: Due meds given. 2357: No change from previous assessment. 0200: Comfortably sleeping    0331: No change from previous assessment. 6530: Slept good thru night. Needs attended. 0700:Bedside and Verbal shift change report given to Priscilla Olivares (oncoming nurse) by me  (offgoing nurse). Report included the following information SBAR, Kardex, Intake/Output, MAR, Recent Results and Cardiac Rhythm Afib.

## 2022-03-12 NOTE — PROGRESS NOTES
Problem: Pressure Injury - Risk of  Goal: *Prevention of pressure injury  Description: Document Efrem Scale and appropriate interventions in the flowsheet. Outcome: Progressing Towards Goal  Note: Pressure Injury Interventions:  Sensory Interventions: Float heels,Keep linens dry and wrinkle-free,Pressure redistribution bed/mattress (bed type),Use 30-degree side-lying position    Moisture Interventions: Absorbent underpads    Activity Interventions: Pressure redistribution bed/mattress(bed type),PT/OT evaluation    Mobility Interventions: Float heels,HOB 30 degrees or less,Pressure redistribution bed/mattress (bed type),PT/OT evaluation    Nutrition Interventions: Document food/fluid/supplement intake    Friction and Shear Interventions: Foam dressings/transparent film/skin sealants,HOB 30 degrees or less,Minimize layers                Problem: Falls - Risk of  Goal: *Absence of Falls  Description: Document Antonio Fall Risk and appropriate interventions in the flowsheet.   Outcome: Progressing Towards Goal  Note: Fall Risk Interventions:  Mobility Interventions: Communicate number of staff needed for ambulation/transfer,Patient to call before getting OOB,Utilize walker, cane, or other assistive device         Medication Interventions: Bed/chair exit alarm,Patient to call before getting OOB    Elimination Interventions: Bed/chair exit alarm,Call light in reach,Patient to call for help with toileting needs    History of Falls Interventions: Bed/chair exit alarm,Door open when patient unattended,Room close to nurse's station         Problem: Pain  Goal: *Control of Pain  Outcome: Progressing Towards Goal     Problem: Afib: Discharge Outcomes (not in CAT)  Goal: *Hemodynamically stable  Outcome: Progressing Towards Goal  Goal: *Stable cardiac rhythm  Outcome: Progressing Towards Goal  Goal: *Lungs clear or at baseline  Outcome: Progressing Towards Goal  Goal: *Optimal pain control at patient's stated goal  Outcome: Progressing Towards Goal  Goal: *Identifies cardiac risk factors  Outcome: Progressing Towards Goal  Goal: *Verbalizes home exercise program, activity guidelines, cardiac precautions  Outcome: Progressing Towards Goal  Goal: *Verbalizes understanding and describes prescribed diet  Outcome: Progressing Towards Goal  Goal: *Verbalizes understanding and describes medication purposes and frequencies  Outcome: Progressing Towards Goal  Goal: *Anxiety reduced or absent  Outcome: Progressing Towards Goal  Goal: *Understands and describes signs and symptoms to report to providers(Stroke Metric)  Outcome: Progressing Towards Goal  Goal: *Describes follow-up/return visits to physicians  Outcome: Progressing Towards Goal  Goal: *Describes available resources and support systems  Outcome: Progressing Towards Goal  Goal: *Influenza immunization  Outcome: Progressing Towards Goal  Goal: *Pneumococcal immunization  Outcome: Progressing Towards Goal

## 2022-03-13 PROCEDURE — 74011250637 HC RX REV CODE- 250/637: Performed by: INTERNAL MEDICINE

## 2022-03-13 PROCEDURE — 74011250637 HC RX REV CODE- 250/637: Performed by: PHYSICIAN ASSISTANT

## 2022-03-13 PROCEDURE — 94762 N-INVAS EAR/PLS OXIMTRY CONT: CPT

## 2022-03-13 PROCEDURE — 74011000250 HC RX REV CODE- 250: Performed by: SURGERY

## 2022-03-13 PROCEDURE — 65660000004 HC RM CVT STEPDOWN

## 2022-03-13 PROCEDURE — 74011250637 HC RX REV CODE- 250/637: Performed by: SURGERY

## 2022-03-13 RX ADMIN — CETIRIZINE HYDROCHLORIDE 10 MG: 10 TABLET, FILM COATED ORAL at 08:27

## 2022-03-13 RX ADMIN — METOPROLOL TARTRATE 50 MG: 50 TABLET, FILM COATED ORAL at 17:13

## 2022-03-13 RX ADMIN — APIXABAN 5 MG: 5 TABLET, FILM COATED ORAL at 17:13

## 2022-03-13 RX ADMIN — APIXABAN 5 MG: 5 TABLET, FILM COATED ORAL at 08:27

## 2022-03-13 RX ADMIN — GABAPENTIN 400 MG: 400 CAPSULE ORAL at 17:13

## 2022-03-13 RX ADMIN — ASPIRIN 81 MG 81 MG: 81 TABLET ORAL at 08:27

## 2022-03-13 RX ADMIN — DILTIAZEM HYDROCHLORIDE 240 MG: 240 CAPSULE, COATED, EXTENDED RELEASE ORAL at 08:27

## 2022-03-13 RX ADMIN — SODIUM CHLORIDE, PRESERVATIVE FREE 10 ML: 5 INJECTION INTRAVENOUS at 15:36

## 2022-03-13 RX ADMIN — OXYCODONE AND ACETAMINOPHEN 2 TABLET: 5; 325 TABLET ORAL at 17:16

## 2022-03-13 RX ADMIN — OXYCODONE AND ACETAMINOPHEN 2 TABLET: 5; 325 TABLET ORAL at 21:49

## 2022-03-13 RX ADMIN — SODIUM CHLORIDE, PRESERVATIVE FREE 10 ML: 5 INJECTION INTRAVENOUS at 05:43

## 2022-03-13 RX ADMIN — ATORVASTATIN CALCIUM 80 MG: 40 TABLET, FILM COATED ORAL at 21:49

## 2022-03-13 RX ADMIN — GABAPENTIN 400 MG: 400 CAPSULE ORAL at 08:27

## 2022-03-13 RX ADMIN — LOSARTAN POTASSIUM 25 MG: 25 TABLET, FILM COATED ORAL at 08:27

## 2022-03-13 RX ADMIN — GABAPENTIN 400 MG: 400 CAPSULE ORAL at 21:49

## 2022-03-13 RX ADMIN — DIGOXIN 0.25 MG: 250 TABLET ORAL at 08:27

## 2022-03-13 RX ADMIN — METOPROLOL TARTRATE 50 MG: 50 TABLET, FILM COATED ORAL at 08:27

## 2022-03-13 NOTE — PROGRESS NOTES
Making improvement each day  Getting stronger with mobility but still limited  Incisions are intact bypass is patent

## 2022-03-14 PROCEDURE — 74011250637 HC RX REV CODE- 250/637: Performed by: INTERNAL MEDICINE

## 2022-03-14 PROCEDURE — 97535 SELF CARE MNGMENT TRAINING: CPT

## 2022-03-14 PROCEDURE — 97168 OT RE-EVAL EST PLAN CARE: CPT

## 2022-03-14 PROCEDURE — 74011250637 HC RX REV CODE- 250/637: Performed by: SURGERY

## 2022-03-14 PROCEDURE — 74011000250 HC RX REV CODE- 250: Performed by: SURGERY

## 2022-03-14 PROCEDURE — 97530 THERAPEUTIC ACTIVITIES: CPT

## 2022-03-14 PROCEDURE — 74011250637 HC RX REV CODE- 250/637: Performed by: PHYSICIAN ASSISTANT

## 2022-03-14 PROCEDURE — 97116 GAIT TRAINING THERAPY: CPT

## 2022-03-14 PROCEDURE — 65660000004 HC RM CVT STEPDOWN

## 2022-03-14 RX ADMIN — DIGOXIN 0.25 MG: 250 TABLET ORAL at 08:27

## 2022-03-14 RX ADMIN — APIXABAN 5 MG: 5 TABLET, FILM COATED ORAL at 08:27

## 2022-03-14 RX ADMIN — LOSARTAN POTASSIUM 25 MG: 25 TABLET, FILM COATED ORAL at 08:27

## 2022-03-14 RX ADMIN — METOPROLOL TARTRATE 50 MG: 50 TABLET, FILM COATED ORAL at 08:27

## 2022-03-14 RX ADMIN — OXYCODONE AND ACETAMINOPHEN 2 TABLET: 5; 325 TABLET ORAL at 21:12

## 2022-03-14 RX ADMIN — GABAPENTIN 400 MG: 400 CAPSULE ORAL at 17:22

## 2022-03-14 RX ADMIN — GABAPENTIN 400 MG: 400 CAPSULE ORAL at 08:27

## 2022-03-14 RX ADMIN — ATORVASTATIN CALCIUM 80 MG: 40 TABLET, FILM COATED ORAL at 21:12

## 2022-03-14 RX ADMIN — OXYCODONE AND ACETAMINOPHEN 2 TABLET: 5; 325 TABLET ORAL at 17:21

## 2022-03-14 RX ADMIN — OXYCODONE AND ACETAMINOPHEN 2 TABLET: 5; 325 TABLET ORAL at 09:08

## 2022-03-14 RX ADMIN — GABAPENTIN 400 MG: 400 CAPSULE ORAL at 21:12

## 2022-03-14 RX ADMIN — SODIUM CHLORIDE, PRESERVATIVE FREE 10 ML: 5 INJECTION INTRAVENOUS at 01:15

## 2022-03-14 RX ADMIN — ASPIRIN 81 MG 81 MG: 81 TABLET ORAL at 08:27

## 2022-03-14 RX ADMIN — SODIUM CHLORIDE, PRESERVATIVE FREE 10 ML: 5 INJECTION INTRAVENOUS at 05:25

## 2022-03-14 RX ADMIN — METOPROLOL TARTRATE 50 MG: 50 TABLET, FILM COATED ORAL at 17:22

## 2022-03-14 RX ADMIN — SODIUM CHLORIDE, PRESERVATIVE FREE 10 ML: 5 INJECTION INTRAVENOUS at 21:40

## 2022-03-14 RX ADMIN — APIXABAN 5 MG: 5 TABLET, FILM COATED ORAL at 17:22

## 2022-03-14 RX ADMIN — DILTIAZEM HYDROCHLORIDE 240 MG: 240 CAPSULE, COATED, EXTENDED RELEASE ORAL at 08:27

## 2022-03-14 RX ADMIN — SODIUM CHLORIDE, PRESERVATIVE FREE 10 ML: 5 INJECTION INTRAVENOUS at 13:07

## 2022-03-14 RX ADMIN — CETIRIZINE HYDROCHLORIDE 10 MG: 10 TABLET, FILM COATED ORAL at 08:27

## 2022-03-14 NOTE — PROGRESS NOTES
Problem: Mobility Impaired (Adult and Pediatric)  Goal: *Acute Goals and Plan of Care (Insert Text)  Description: Physical Therapy Goals  Initiated 3/8/2022 and to be accomplished within 7 day(s)  1. Patient will move from supine to sit and sit to supine in bed with modified independence. 2.  Patient will transfer from bed to chair and chair to bed with modified independence using the least restrictive device. 3.  Patient will maintain WB precautions 80% throughout mobility. PLOF: Patient lives alone in single story home. He has prosthetic for left AKA. Also had RW and WC. Outcome: Progressing Towards Goal     PHYSICAL THERAPY TREATMENT    Patient: Can Rodriguez (64 y.o. male)  Date: 3/14/2022  Diagnosis: Occlusion of right femoral-popliteal bypass graft ([facility] [address] [T82.898A] <principal problem not specified>  Procedure(s) (LRB):  AMPUTATION RIGHT FOURTH AND FIFTH TOES PARTIAL METATARSAL AMPUTATION (Right) 10 Days Post-Op  Precautions: Fall,Skin (PWB to R heel w post op shoe donned)    ASSESSMENT:  Pt cleared to participate in PT session, pt received semi-reclined in bed and agreeable to therapy session. Completing with OT to maximize safety and mobility. Pt performing bed mobility with Cecy. Pt sitting on EOB and able to don L prosthetic and R post op shoe. Pt attempts to adhere to weight bearing restrictions but limited DF ROM. Working on passive stretch in to DF 2x20 seconds in supine. Pt standing with supervision to RW. Pt ambulating x30 feet in room with SBA. Demonstrating transitions at wheelchair level with supervision. Squat pivot to and from recliner to wheelchair and to and from wheelchair to [facility]. Pt returned to sitting in recliner. Pt positioned for comfort and educated to call for assist before getting up, pt verbalized understanding. Pt left with all needs met and call bell in reach. RN notified of position and participation.      Pt reports he has forearm crutches at home for stair negotiation, Pt has 3 JACI[address] his home with handrail. Pt would benefit from stair training before discharge home. Progression toward goals:   []      Improving appropriately and progressing toward goals  [x]      Improving slowly and progressing toward goals  []      Not making progress toward goals and plan of care will be adjusted     PLAN:  Patient continues to benefit from skilled intervention to address the above impairments. Continue treatment per established plan of care. Discharge Recommendations:  Home Health with increased support from family vs Rehab   Further Equipment Recommendations for Discharge:  rolling walker     SUBJECTIVE:   Patient stated Liset Herron don't have anyone to help at home.     OBJECTIVE DATA SUMMARY:   Critical Behavior:  Neurologic State: Alert  Orientation Level: Oriented X4  Cognition: Follows commands  Safety/Judgement: Fall prevention,Awareness of environment  Functional Mobility Training:  Bed Mobility:     Supine to Sit:  (pt long sitting eating)     Scooting: Modified independent    Transfers:  Sit to Stand: Supervision  Stand to Sit: Modified independent        Bed to Chair: Supervision    Balance:  Sitting: Intact  Standing: Impaired; With support  Standing - Static: Good  Standing - Dynamic : Fair   Range Of Motion:   AROM: Within functional limits      Posture:   Posture (WDL): Within defined limits       Wheelchair Mobility:   Wheelchair  distance (ft): 10 Feet (x2 (independent))      Ambulation/Gait Training:  Distance (ft): 30 Feet (ft)  Assistive Device: Walker, rolling  Ambulation - Level of Assistance: Stand-by assistance    Gait Abnormalities: Decreased step clearance    Base of Support: Shift to left     Speed/Suzanna: Slow  Step Length: Right shortened;Left shortened    Pain:  Pain level pre-treatment: 6/10  Pain level post-treatment: 6/10   R foot    Activity Tolerance:   Improving tolerance     Please refer to the flowsheet for vital signs taken during this treatment. After treatment:   [x] Patient left in no apparent distress sitting up in chair  [] Patient left in no apparent distress in bed  [x] Call bell left within reach  [x] Nursing notified  [] Caregiver present  [] Bed alarm activated  [] SCDs applied      COMMUNICATION/EDUCATION:   [x]         Role of Physical Therapy in the acute care setting. [x]         Fall prevention education was provided and the patient/caregiver indicated understanding. [x]         Patient/family have participated as able in working toward goals and plan of care. [x]         Patient/family agree to work toward stated goals and plan of care. []         Patient understands intent and goals of therapy, but is neutral about his/her participation.   []         Patient is unable to participate in stated goals/plan of care: ongoing with therapy staff.  []         Other:        Monae Moore, PT   Time Calculation: 31 mins

## 2022-03-14 NOTE — PROGRESS NOTES
Occupational Therapy Goals    1. Patient will perform bed>BSC using slide board while maintaining WB restrictions with modified independence. (Met at wheelchair level 3/14)  2. Patient will perform bed>w/c transfer using slide board while maintaining WB restructions in prep for bathroom mobility and self care participation with modified independence (Met 3/14)  3. Patient will perform all aspects of toileting with modified independence. (Met)  4. Patient will participate in upper extremity therapeutic exercise/activities with modified independence for 10 minutes with there ex hand out and t band. Problem: Self Care Deficits Care Plan (Adult)  Goal: *Acute Goals and Plan of Care (Insert Text)  Outcome: Resolved/Met       OCCUPATIONAL THERAPY RE-EVALUATION/DISCHARGE    Patient: Ttio Callahan (8 y.o. male)  Date: 3/14/2022  Primary Diagnosis: Occlusion of right femoral-popliteal bypass graft ([facility] [T82.898A]  Procedure(s) (LRB):  AMPUTATION RIGHT FOURTH AND FIFTH TOES PARTIAL METATARSAL AMPUTATION (Right) 10 Days Post-Op   Precautions: Fall,Skin (PWB to R heel w post op shoe donned)  PLOF: Patient was independent with self-care and used a wheelchair and forearm crutches for functional mobility PTA. Patient reports having a rollator, shower chair and standard walker at home as well. ASSESSMENT AND RECOMMENDATIONS:  Upon entering the room, the patient was in bed eating breakfast, alert, and agreeable to participate in OT re-evaluation. Patient able to restate WB status this session and adhere this session with 100% accuracy. Patient modified independent with bed mobility and with lower body dressing seated EOB and standing. Patient performed bedside commode, wheelchair and tub transfer to shower seat this session simulating home environment with modified independence.  Patient remains supervision - modified independent with functional mobility using rolling walker, modified independent at wheelchair level. The patient presents with good static standing and fair dynamic standing balance, however will defer to PT for functional balance and functional mobility tasks. Patient with no further selfcare concerns this session, OT to sign off. Skilled occupational therapy is not indicated at this time. Discharge Recommendations: Home Health  Further Equipment Recommendations for Discharge: rolling walker for safety as pt reports having standard walker and rollator and wheelchair (Patient current wheelchair is worn)     SUBJECTIVE:   Patient stated I can show you how  I get In the tub if you want    OBJECTIVE DATA SUMMARY:     Past Medical History:   Diagnosis Date    Acute blood loss as cause of postoperative anemia 4/4/2017    Anticoagulated on Coumadin     Aortoiliac occlusive disease ([address] 1/25/2017    Atherosclerosis of native artery of both lower extremities with intermittent claudication ([address] 1/25/2017    Benign hypertensive heart disease with systolic congestive heart failure, NYHA class 2 ([address] 1/26/2015    Carotid artery disease (HCC)     Chronic anemia     Chronic systolic heart failure ([address]     Chronic ulcer of right foot ([address] 4/4/2017    Chronic ulcer of right heel ([address] 8/8/2017    Coronary artery disease involving native coronary artery of native heart 3/15/2017    Successful stenting of Cx (Xience LESLEY) and RCA (Xience LESLEY) to 0% by Dr. Mady Stark on 3/15/17.     DDD (degenerative disc disease), lumbar 1/26/2015    Dyslipidemia     Erectile dysfunction 7/5/2016    Euthyroid sick syndrome 4/6/2017    Hereditary peripheral neuropathy 11/15/2016    History of cardioversion 04/18/2017    S/P Synchronized external cardioversion (4/18/2017 - Dr. Alex Wilder)    History of vitamin D deficiency 4/22/2017    Vitamin D 25-Hydroxy (4/22/2017) = 12.0; Vitamin D 25-Hydroxy (5/26/2017) = 38.7    Infection of vascular bypass graft ([address] 7/24/2017    Left lower extremity    Ischemic cardiomyopathy Moderate to severe pulmonary hypertension (HCC) 07/23/2017    Paroxysmal atrial fibrillation (HCC)     Peripheral artery disease ([facility] [address] 1/25/2017    Skin ulcer of malleolar area of right ankle ([facility] [address]     Spinal stenosis of lumbar region with radiculopathy 5/4/2015    Dr. Shay Turk     Past Surgical History:   Procedure Laterality Date    CARDIAC CATHETERIZATION  3/8/2017         CARDIAC CATHETERIZATION  3/15/2017         CORONARY STENT SINGLE W/PTCA  3/15/2017         HX ABOVE KNEE AMPUTATION Left 08/18/2017    S/P Left above-the-knee amputation (8/18/2017 - Dr. Tobin Peraza)    HX ATHERECTOMY Left 06/15/2017    S/P Atherectomy and balloon angioplasty of the left superficial femoral artery and above-knee popliteal artery (6/15/2017 - Dr. Tobin Peraza)    HX ATHERECTOMY Right 09/07/2017    S/P Atherectomy and balloon angioplasty of the below-the-knee popliteal artery, above-the-knee popliteal artery, tibioperoneal trunk artery and posterior tibial artery (9/7/2017 - Dr. Tobin Peraza)    HX COLONOSCOPY  07/23/2015    polyps repeat 2020 5 years Eliz Henderson Right 04/04/2017    S/P Right axillary to bifemoral artery bypass using an 8 mm Propaten graft from the right axilla to the right common femoral artery with a jump femoral-femoral bypass with another 8 mm Propaten external ring bypass; Right common femoral artery, and profunda femoris artery and superficial femoral artery endarterectomy causing an extensive surgery on the right side (4/4/2017 - Dr. Rahel Chong)    [address] Right 04/07/2017    S/P Cutdown of femoral-femoral bypass, more on the left groin side; Right lower extremity angiography with first-order catheterization (4/7/2017 - Dr. Tobin Peraza)    [address] Right 04/10/2017    S/P Right femoral to above-knee popliteal bypass with an 8 mm PTFE graft (4/10/2017 - Dr. Srinivas Galvez)    HX OTHER SURGICAL Left 07/25/2017    S/P Removal of infected graft; Repair of left superficial femoral artery; Repair of left common femoral artery (7/25/2017 - Dr. Kylie Wolff)    HX OTHER SURGICAL Right 06/27/2017    S/P Drainage of right side abscess (6/27/2017 - Dr. Kylie Wolff)    HX OTHER SURGICAL Left 07/01/2017    S/P Left groin exploration; Left femoral repair pseudoaneurysm; Left wound washout; Wound VAC placement (7/01/2017 - Dr. Kylie Wolff)    HX OTHER SURGICAL Left 07/04/2017    S/P Left common femoral artery ligation; Jump bypass from right to left fem-fem to a more distal superficial femoral artery using 8 mm external ringed Propaten graft; Left groin wound exploration and washout (7/4/2017 - Dr. Kylie Wolff)    HX OTHER SURGICAL Right 08/18/2017    S/P Right axillary femoral jump bypass interposition; Removal of infected right axillary femoral bypass; Wound VAC placement of upper thigh 1.2 cm x 5.2 cm x 1.8 cm and groin 4 cm x 0.5 cm x 0.2 cm (8/18/2017 - Dr. Kylie Wolff)     Barriers to Learning/Limitations: None  Compensate with: visual, verbal, tactile, kinesthetic cues/model    Home Situation:   Home Situation  Home Environment: Private residence  One/Two Story Residence: One story  Living Alone: Yes  Support Systems: Child(mile)  Patient Expects to be Discharged to[de-identified] Home  Current DME Used/Available at Home: [address], rolling,Wheelchair,Shower chair  Tub or Shower Type: Tub/Shower combination  [x]     Right hand dominant   []     Left hand dominant    Cognitive/Behavioral Status:  Neurologic State: Alert  Orientation Level: Oriented X4  Cognition: Follows commands  Safety/Judgement: Fall prevention; Awareness of environment    Skin: Intact  Edema: None noted    Vision/Perceptual:         Acuity: Within Defined Limits         Coordination: BUE     Fine Motor Skills-Upper: Left Intact; Right Intact    Gross Motor Skills-Upper: Left Intact; Right Intact    Balance:  Sitting: Intact  Standing: Impaired; With support  Standing - Static: Good  Standing - Dynamic : Fair    Strength: BUE    Strength: Within functional limits     Tone & Sensation: BUE  Tone: Normal  Sensation: Intact        Range of Motion: BUE  AROM: Within functional limits       Functional Mobility and Transfers for ADLs:  Bed Mobility:  Scooting: Modified independent    Transfers:  Sit to Stand: Supervision  Stand to Sit: Modified independent     Toilet Transfer : Modified independent (SPT at wheelchair level)   Tub Transfer: Modified independent (SPT at wheelchair level simulating home environment)   Bathroom Mobility: Supervision/set up    ADL Assessment:  Feeding: Independent    Oral Facial Hygiene/Grooming: Independent    Bathing: Modified independent (seated)    Upper Body Dressing: Independent    Lower Body Dressing: Modified independent    Toileting: Modified independent                ADL Intervention:  Feeding  Feeding Assistance: Independent  Container Management: Independent  Utensil Management: Independent  Food to Mouth: Independent    Upper Body Dressing Assistance  Dressing Assistance: [id number] [facility] [id number]: Independent    Lower Body Dressing Assistance  Dressing Assistance: Modified independent  Protective Undergarmet: Modified independent (LLE sleeve and prosthetic)  Shoes with Velcro: Modified independent (RLE post op shoe)  Leg Crossed Method Used: Yes  Position Performed: Seated edge of bed;Standing    Cognitive Retraining  Safety/Judgement: Fall prevention; Awareness of environment    Pain:  Pain level pre-treatment: 6/10 , R foot  Pain level post-treatment: 6/10   Pain Intervention(s): Medication (see MAR); Rest, Ice, Repositioning   Response to intervention: Nurse notified, See doc flow    Activity Tolerance:   Good    Please refer to the flowsheet for vital signs taken during this treatment.   After treatment:   [x]  Patient left in no apparent distress sitting up in chair  []  Patient left in no apparent distress in bed  [x] Call bell left within reach  [x]  Nursing notified  []  Caregiver present  []  Bed alarm activated    COMMUNICATION/EDUCATION:   [x]      Role of Occupational Therapy in the acute care setting  [x]      Home safety education was provided and the patient/caregiver indicated understanding. [x]      Patient/family have participated as able and agree with findings and recommendations. []      Patient is unable to participate in plan of care at this time.     Thank you for this referral.  Emanuel Mejia, OTR/L  Time Calculation: 32 mins

## 2022-03-14 NOTE — PROGRESS NOTES
Sitting up in chair, good spirits no complaints  Has already been on physical therapy today continues to improve  Wounds are clean dry and intact  No discharge from foot wound  Continue with inpatient rehab with goal of discharge to home

## 2022-03-15 PROCEDURE — 74011000250 HC RX REV CODE- 250: Performed by: SURGERY

## 2022-03-15 PROCEDURE — 74011250637 HC RX REV CODE- 250/637: Performed by: PHYSICIAN ASSISTANT

## 2022-03-15 PROCEDURE — 65660000004 HC RM CVT STEPDOWN

## 2022-03-15 PROCEDURE — 97164 PT RE-EVAL EST PLAN CARE: CPT

## 2022-03-15 PROCEDURE — 74011250637 HC RX REV CODE- 250/637: Performed by: SURGERY

## 2022-03-15 PROCEDURE — 97530 THERAPEUTIC ACTIVITIES: CPT

## 2022-03-15 PROCEDURE — 74011250637 HC RX REV CODE- 250/637: Performed by: INTERNAL MEDICINE

## 2022-03-15 RX ADMIN — APIXABAN 5 MG: 5 TABLET, FILM COATED ORAL at 17:16

## 2022-03-15 RX ADMIN — APIXABAN 5 MG: 5 TABLET, FILM COATED ORAL at 08:22

## 2022-03-15 RX ADMIN — OXYCODONE AND ACETAMINOPHEN 2 TABLET: 5; 325 TABLET ORAL at 14:11

## 2022-03-15 RX ADMIN — GABAPENTIN 400 MG: 400 CAPSULE ORAL at 21:33

## 2022-03-15 RX ADMIN — SODIUM CHLORIDE, PRESERVATIVE FREE 10 ML: 5 INJECTION INTRAVENOUS at 06:55

## 2022-03-15 RX ADMIN — DILTIAZEM HYDROCHLORIDE 240 MG: 240 CAPSULE, COATED, EXTENDED RELEASE ORAL at 10:06

## 2022-03-15 RX ADMIN — GABAPENTIN 400 MG: 400 CAPSULE ORAL at 08:21

## 2022-03-15 RX ADMIN — ATORVASTATIN CALCIUM 80 MG: 40 TABLET, FILM COATED ORAL at 21:32

## 2022-03-15 RX ADMIN — ASPIRIN 81 MG 81 MG: 81 TABLET ORAL at 08:22

## 2022-03-15 RX ADMIN — OXYCODONE AND ACETAMINOPHEN 2 TABLET: 5; 325 TABLET ORAL at 18:10

## 2022-03-15 RX ADMIN — METOPROLOL TARTRATE 50 MG: 50 TABLET, FILM COATED ORAL at 17:16

## 2022-03-15 RX ADMIN — CETIRIZINE HYDROCHLORIDE 10 MG: 10 TABLET, FILM COATED ORAL at 08:21

## 2022-03-15 RX ADMIN — DIGOXIN 0.25 MG: 250 TABLET ORAL at 10:06

## 2022-03-15 RX ADMIN — SODIUM CHLORIDE, PRESERVATIVE FREE 10 ML: 5 INJECTION INTRAVENOUS at 13:13

## 2022-03-15 RX ADMIN — SODIUM CHLORIDE, PRESERVATIVE FREE 10 ML: 5 INJECTION INTRAVENOUS at 21:33

## 2022-03-15 RX ADMIN — GABAPENTIN 400 MG: 400 CAPSULE ORAL at 17:16

## 2022-03-15 RX ADMIN — METOPROLOL TARTRATE 50 MG: 50 TABLET, FILM COATED ORAL at 10:06

## 2022-03-15 RX ADMIN — OXYCODONE AND ACETAMINOPHEN 1 TABLET: 5; 325 TABLET ORAL at 21:33

## 2022-03-15 NOTE — PROGRESS NOTES
Pt ascended/descened 3 steps x3 trials with [employer] crutch, and handrails with CGA, [place] for cueing. Edu pt on having his brother assist him up/down stairs for safety. Pt is safe to d/c home with [facility] and assist from brother on stairs. Full note to follow. Thank you.     Fela Da Silva, PT, DPT

## 2022-03-15 NOTE — PROGRESS NOTES
Seen at bedside awake and alert. Inspected right foot. Well approximated incision, small open area  Redressed  Cleared for discharge.   Plan follow up with his regular Podiatrist.

## 2022-03-15 NOTE — PROGRESS NOTES
Problem: Mobility Impaired (Adult and Pediatric)  Goal: *Acute Goals and Plan of Care (Insert Text)  Description: Physical Therapy Goals  Initiated 3/8/2022, re-evaled 3/15/22 and to be accomplished within 7 day(s)  1. Patient will move from supine to sit and sit to supine in bed with modified independence. 2.  Patient will transfer from bed to chair and chair to bed with modified independence using the least restrictive device. 3.  Patient will maintain WB precautions 80% throughout mobility. 4.  Pt will ascend/descend 4 steps with Cecy      PLOF: Patient lives alone in single story home. He has prosthetic for left AKA. Also had RW and WC. Outcome: Progressing Towards Goal     PHYSICAL THERAPY RE-EVALUATION    Patient: Alejandrina Crook (21 y.o. 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 RN cleared for mobility. Pt sitting up in recliner, in NAD, willing to work with PT. Pt performed SPT to his wheelchair with SBA. Pt able to propel himself 150 ft to stairs indep. Indep for donning his prosthetic on. [place] crutches brought to stairs. Pt stood with SBA and ascended/descended 4 steps x3 different trials with L HR and R crutch. B HR used for last trial. Pt required CGA for safety and cueing, edu pt on having his brother go up/down stairs with him. Pt returned back to wheelchair and propelled himself back to the room. SPT back to the chair. All questions answered. Pt left with call bell and all needs met. Recommend d/c home with [facility] and assist from brother on stairs.     Patient will benefit from skilled intervention to address the above impairments. Patient's rehabilitation potential is considered to be Good  Factors which may influence rehabilitation potential include:   []         None noted  []         Mental ability/status  []         Medical condition  [x]         Home/family situation and support systems  [x]         Safety awareness  []         Pain tolerance/management  []         Other:      PLAN :  Recommendations and Planned Interventions:   [x]           Bed Mobility Training             [x]    Neuromuscular Re-Education  [x]           Transfer Training                   []    Orthotic/Prosthetic Training  [x]           Gait Training                          []    Modalities  [x]           Therapeutic Exercises           []    Edema Management/Control  [x]           Therapeutic Activities            [x]    Family Training/Education  [x]           Patient Education  []           Other (comment):    Frequency/Duration: Patient will be followed by physical therapy 1-2 times per day/4-7 days per week to address goals. Discharge Recommendations: Home Health with assist from brother  Further Equipment Recommendations for Discharge: N/A     SUBJECTIVE:   Patient stated i'll have my brother help me.     OBJECTIVE DATA SUMMARY:   Hospital course since last seen and reason for re-evaluation: Pt has progressed well with therapy  Past Medical History:   Diagnosis Date    Acute blood loss as cause of postoperative anemia 4/4/2017    Anticoagulated on Coumadin     Aortoiliac occlusive disease ([address] 1/25/2017    Atherosclerosis of native artery of both lower extremities with intermittent claudication ([address] 1/25/2017    Benign hypertensive heart disease with systolic congestive heart failure, NYHA class 2 ([address] 1/26/2015    Carotid artery disease (HCC)     Chronic anemia     Chronic systolic heart failure (HCC)     Chronic ulcer of right foot ([address] 4/4/2017    Chronic ulcer of right heel ([address] 8/8/2017    Coronary artery disease involving native coronary artery of native heart 3/15/2017    Successful stenting of Cx (Xience LESLEY) and RCA (Xience LESLEY) to 0% by Dr. Simba Kennedy on 3/15/17.     DDD (degenerative disc disease), lumbar 1/26/2015    Dyslipidemia     Erectile dysfunction 7/5/2016    Euthyroid sick syndrome 4/6/2017    Hereditary peripheral neuropathy 11/15/2016    History of cardioversion 04/18/2017    S/P Synchronized external cardioversion (4/18/2017 - Dr. Hugo Segovia)    History of vitamin D deficiency 4/22/2017    Vitamin D 25-Hydroxy (4/22/2017) = 12.0; Vitamin D 25-Hydroxy (5/26/2017) = 38.7    Infection of vascular bypass graft ([address] 7/24/2017    Left lower extremity    Ischemic cardiomyopathy     Moderate to severe pulmonary hypertension ([address] 07/23/2017    Paroxysmal atrial fibrillation ([address]     Peripheral artery disease ([address] 1/25/2017    Skin ulcer of malleolar area of right ankle ([address]     Spinal stenosis of lumbar region with radiculopathy 5/4/2015    Dr. Marinell Canavan     Past Surgical History:   Procedure Laterality Date    CARDIAC CATHETERIZATION  3/8/2017         CARDIAC CATHETERIZATION  3/15/2017         CORONARY STENT SINGLE W/PTCA  3/15/2017         HX ABOVE KNEE AMPUTATION Left 08/18/2017    S/P Left above-the-knee amputation (8/18/2017 - Dr. Cecilia Pozo)    HX ATHERECTOMY Left 06/15/2017    S/P Atherectomy and balloon angioplasty of the left superficial femoral artery and above-knee popliteal artery (6/15/2017 - Dr. Cecilia Pozo)    HX ATHERECTOMY Right 09/07/2017    S/P Atherectomy and balloon angioplasty of the below-the-knee popliteal artery, above-the-knee popliteal artery, tibioperoneal trunk artery and posterior tibial artery (9/7/2017 - Dr. Cecilia Pozo)    HX COLONOSCOPY  07/23/2015    polyps repeat 2020 5 years Marcelina Henderson Right 04/04/2017    S/P Right axillary to bifemoral artery bypass using an 8 mm Propaten graft from the right axilla to the right common femoral artery with a jump femoral-femoral bypass with another 8 mm Propaten external ring bypass; Right common femoral artery, and profunda femoris artery and superficial femoral artery endarterectomy causing an extensive surgery on the right side (4/4/2017 - Dr. Elo Laguna)    [address] Right 04/07/2017    S/P Cutdown of femoral-femoral bypass, more on the left groin side; Right lower extremity angiography with first-order catheterization (4/7/2017 - Dr. Edgar Nance)    [address] Right 04/10/2017    S/P Right femoral to above-knee popliteal bypass with an 8 mm PTFE graft (4/10/2017 - Dr. Ana Davis)    HX OTHER SURGICAL Left 07/25/2017    S/P Removal of infected graft; Repair of left superficial femoral artery; Repair of left common femoral artery (7/25/2017 - Dr. Edgar Nance)    HX OTHER SURGICAL Right 06/27/2017    S/P Drainage of right side abscess (6/27/2017 - Dr. Edgar Nance)    HX OTHER SURGICAL Left 07/01/2017    S/P Left groin exploration; Left femoral repair pseudoaneurysm; Left wound washout; Wound VAC placement (7/01/2017 - Dr. Edgar Nance)    HX OTHER SURGICAL Left 07/04/2017    S/P Left common femoral artery ligation; Jump bypass from right to left fem-fem to a more distal superficial femoral artery using 8 mm external ringed Propaten graft; Left groin wound exploration and washout (7/4/2017 - Dr. Edgar Nance)    HX OTHER SURGICAL Right 08/18/2017    S/P Right axillary femoral jump bypass interposition; Removal of infected right axillary femoral bypass;  Wound VAC placement of upper thigh 1.2 cm x 5.2 cm x 1.8 cm and groin 4 cm x 0.5 cm x 0.2 cm (8/18/2017 - Dr. Edgar Nance)     Barriers to Learning/Limitations: None  Compensate with: N/A  Home Situation:   Home Situation  Home Environment: Private residence  One/Two Story Residence: One story  Living Alone: Yes  Support Systems: Child(mile)  Patient Expects to be Discharged to[de-identified] Home  Current DME Used/Available at Home: Stanley Grapes, rolling,Wheelchair,Shower chair  Tub or Shower Type: Tub/Shower combination  Critical Behavior:  Neurologic State: Alert  Orientation Level: Oriented X4  Cognition: Follows commands  Safety/Judgement: Fall prevention  Psychosocial  Patient Behaviors: Calm; Cooperative  Purposeful Interaction: Yes  Pt Identified Daily Priority: Clinical issues (comment)  Caritas Process: Establish trust;Nurture spiritual self;Teaching/learning; Attend basic human needs;Create healing environment  Caring Interventions: Reassure  Reassure: Caring rounds  Therapeutic Modalities: Intentional therapeutic touch;Humor  Other Caring Modalities: hourly rounds  Skin Condition/Temp: Warm     Skin Integrity: Wound (add Wound LDA)  Skin Integumentary  Skin Color: Appropriate for ethnicity  Skin Condition/Temp: Warm  Skin Integrity: Wound (add Wound LDA)  Turgor: Non-tenting     Strength:    Strength: Generally decreased, functional                    Tone & Sensation:   Tone: Normal    Sensation: Intact        Range Of Motion:  AROM: Generally decreased, functional    Transfers:  Sit to Stand: Contact guard assistance  Stand to Sit: Contact guard assistance    Balance:   Sitting: Intact; With support  Standing: Impaired; With support  Standing - Static: Good  Standing - Dynamic : Fair    Ambulation/Gait Training:     Assistive Device: Other (comment) (forearm crutches)     Stairs:  Number of Stairs Trained: 4 (x3)  Stairs - Level of Assistance: Contact guard assistance  Rail Use: Left      Pain:  Pain level pre-treatment: 4/10   Pain level post-treatment: 4/10   Pain Intervention(s) : Medication (see MAR); Rest, Ice, Repositioning   Response to intervention: Nurse notified, See doc flow    Activity Tolerance:   Pt tolerated mobility well  Please refer to the flowsheet for vital signs taken during this treatment.   After treatment:   [x]         Patient left in no apparent distress sitting up in chair  []         Patient left in no apparent distress in bed  [x]         Call bell left within reach  [x]         Nursing notified  []         Caregiver present  []         Bed alarm activated  []         SCDs applied    COMMUNICATION/EDUCATION:   [x]         Role of Physical Therapy in the acute care setting. [x]         Fall prevention education was provided and the patient/caregiver indicated understanding. [x]         Patient/family have participated as able in goal setting and plan of care. []         Patient/family agree to work toward stated goals and plan of care. []         Patient understands intent and goals of therapy, but is neutral about his/her participation. []         Patient is unable to participate in goal setting/plan of care: ongoing with therapy staff.  []         Other:     Thank you for this referral.  Sherie Davalos   Time Calculation: 31 mins

## 2022-03-16 VITALS
HEIGHT: 70 IN | RESPIRATION RATE: 23 BRPM | OXYGEN SATURATION: 100 % | HEART RATE: 74 BPM | BODY MASS INDEX: 24.71 KG/M2 | WEIGHT: 172.6 LBS | TEMPERATURE: 98.4 F | SYSTOLIC BLOOD PRESSURE: 113 MMHG | DIASTOLIC BLOOD PRESSURE: 68 MMHG

## 2022-03-16 PROCEDURE — 74011250637 HC RX REV CODE- 250/637: Performed by: SURGERY

## 2022-03-16 PROCEDURE — 74011250637 HC RX REV CODE- 250/637: Performed by: INTERNAL MEDICINE

## 2022-03-16 PROCEDURE — 74011250637 HC RX REV CODE- 250/637: Performed by: PHYSICIAN ASSISTANT

## 2022-03-16 PROCEDURE — 2709999900 HC NON-CHARGEABLE SUPPLY

## 2022-03-16 PROCEDURE — 74011000250 HC RX REV CODE- 250: Performed by: SURGERY

## 2022-03-16 RX ADMIN — APIXABAN 5 MG: 5 TABLET, FILM COATED ORAL at 08:27

## 2022-03-16 RX ADMIN — CETIRIZINE HYDROCHLORIDE 10 MG: 10 TABLET, FILM COATED ORAL at 08:27

## 2022-03-16 RX ADMIN — DIGOXIN 0.25 MG: 250 TABLET ORAL at 08:27

## 2022-03-16 RX ADMIN — OXYCODONE AND ACETAMINOPHEN 2 TABLET: 5; 325 TABLET ORAL at 09:43

## 2022-03-16 RX ADMIN — DOCUSATE SODIUM 100 MG: 100 CAPSULE, LIQUID FILLED ORAL at 08:27

## 2022-03-16 RX ADMIN — SODIUM CHLORIDE, PRESERVATIVE FREE 10 ML: 5 INJECTION INTRAVENOUS at 06:33

## 2022-03-16 RX ADMIN — METOPROLOL TARTRATE 50 MG: 50 TABLET, FILM COATED ORAL at 08:27

## 2022-03-16 RX ADMIN — GABAPENTIN 400 MG: 400 CAPSULE ORAL at 08:27

## 2022-03-16 RX ADMIN — DILTIAZEM HYDROCHLORIDE 240 MG: 240 CAPSULE, COATED, EXTENDED RELEASE ORAL at 08:27

## 2022-03-16 RX ADMIN — LOSARTAN POTASSIUM 25 MG: 25 TABLET, FILM COATED ORAL at 09:00

## 2022-03-16 RX ADMIN — ASPIRIN 81 MG 81 MG: 81 TABLET ORAL at 08:27

## 2022-03-16 NOTE — PROGRESS NOTES
Discharge orders noted. Met with pt and he stated his daughter will be picking him up. Informed Damon of [facility] of pt's discharge. Discharge order noted for today. Pt has been accepted to [address] [place] [address]. Met with patient  and are agreeable to the transition plan today. Transport has been arranged through pt's daughter. Patient's discharge summary and home health  orders have been forwarded to [facility]  [place]. Updated bedside RN, Loree Recinos  to the transition plan.   Discharge information has been documented on the AVS.             CHRISTOPHER Ngo RN  Care Management  Pager: [phone]

## 2022-03-16 NOTE — PROGRESS NOTES
Bedside, Verbal, and Written shift change report given to Chelsey Boyer RN (oncoming nurse) by BEAU Hewitt (offgoing nurse). Report included the following information SBAR, Kardex, Intake/Output, MAR, Recent Results, and Cardiac Rhythm afib.     0945 I have reviewed discharge instructions with the patient. The patient verbalized understanding. IVs removed and patient belongings packed. Pt awaiting ride.

## 2022-03-16 NOTE — HOME CARE
Received discharge orders for today. Patient does live alone. Able to learn and care for wound by self. Does have daughter and brother who can check on patient when needed. Additional orders and information have been noted and updated to be processed to central intake.      ---   Narda Chávez, LPN  [address] Liaison

## 2022-03-16 NOTE — DISCHARGE INSTRUCTIONS
Patient Education        Above-the-Knee Leg Amputation: What to Expect at Home  Your Recovery  An above-the-knee amputation is surgery to remove your leg above the knee. Your doctor removed the leg while keeping as much healthy bone, skin, blood vessel, and nerve tissue as possible. After the surgery, you will probably have bandages, a rigid dressing, or a cast over the remaining part of your leg (residual limb). The leg may be swollen for at least 4 weeks after your surgery. If you have a rigid dressing or cast, your doctor will set up regular visits to change the dressing or cast and check the healing. If you have elastic bandages, your doctor will tell you how to change them. You may have pain in your remaining limb. You also may think you have feeling or pain where your leg was. This is called phantom pain. It is common and may come and go for a year or longer. Your doctor can give you medicine for both types of pain. You may have already started a rehabilitation program (rehab). You will continue this under the guidance of your doctor or physical therapist. Carson Shires will need to do a lot of work to recondition your muscles and relearn activities, balance, and coordination. Rehab can last as long as 1 year. You may have been fitted with a temporary artificial leg while you were still in the hospital. If this is the case, your doctor will teach you how to care for it. If you are getting an artificial leg, you may need to get used to it before you go back to work and your other activities. You will probably not wear it all the time, so you will need to learn how to use a wheelchair, crutches, or other device. You will have to make changes in your home. Your workplace may be able to make allowances for you. Having your leg amputated can be traumatic. And learning to live with new limits can be hard and frustrating. Many people feel depressed and may grieve for their former lifestyle.  It's important to understand these feelings. Talking with your family, friends, and health professionals about your frustrations is an important part of your recovery. You may also find that it helps to talk with a person who has had an amputation. Remember that even though you've lost a limb, it doesn't change who you are or prevent you from enjoying life. You'll have to adapt and learn new ways to do things. But you can still work and take part in sports and activities. And you can still learn, love, play, and live life to its fullest.  Many organizations can help you adjust to your new life. For example, you can go to [facility]. org for information and support. This care sheet gives you a general idea about how long it will take for you to recover. But each person recovers at a different pace. Follow the steps below to get better as quickly as possible. How can you care for yourself at home? Activity    · Be active. Talk to your doctor about what you can do. If you are active and use your remaining limb, it will heal faster.     · You may shower when your doctor okays it. Wash the remaining limb with soap and water, and pat it dry. You may need help doing this at first.     · You may need to adapt your car to your situation before you drive.     · You will probably be able to return to work and your usual routine when your remaining limb heals. This can be as soon as 4 to 8 weeks after surgery, but it may take longer. Diet    · You can eat your normal diet. If your stomach is upset, try bland, low-fat foods like plain rice, broiled chicken, toast, and yogurt.     · You may notice that your bowel movements are not regular right after your surgery. This is common. Try to avoid constipation and straining with bowel movements. Take a fiber supplement every day. If you have not had a bowel movement after a couple of days, ask your doctor about taking a mild laxative.    Medicines    · Your doctor will tell you if and when you can restart your medicines. He or she will also give you instructions about taking any new medicines.     · If you take aspirin or some other blood thinner, ask your doctor if and when to start taking it again. Make sure that you understand exactly what your doctor wants you to do.     · Take pain medicines exactly as directed. ? If the doctor gave you a prescription medicine for pain, take it as prescribed. ? If you are not taking a prescription pain medicine, ask your doctor if you can take an over-the-counter medicine.     · If you think your pain medicine is making you sick to your stomach:  ? Take your medicine after meals (unless your doctor has told you not to). ? Ask your doctor for a different pain medicine.     · If your doctor prescribed antibiotics, take them as directed. Do not stop taking them just because you feel better. You need to take the full course of antibiotics. Remaining limb care    · You may have bandages, a rigid dressing, or a cast on your remaining limb. Your doctor will tell you how to take care of it. Depending on your dressing and the doctor's instructions:  ? Check your remaining limb daily for irritation, skin breaks, and redness. Tell your doctor about any problems you see. ? Wash your remaining limb with mild soap and warm water every night. Pat it dry.     · If you have a temporary artificial leg, remove it before you go to sleep. Exercise    · Rehabilitation is a series of exercises you do after your surgery. This helps you learn to use your remaining limb and artificial leg. You will work with your doctor and physical therapist to plan this exercise program. To get the best results, you need to do the exercises correctly and as often and as long as your doctor tells you. Your rehab program will give you a number of exercises to do. Always do them as your therapist tells you. Other instructions    · Preventing contractures is very important.  A contracture occurs when a joint becomes stuck in one position. If this happens, it may be hard or impossible to straighten your remaining limb and use an artificial leg.  ? Make sure you put equal weight on both hips when you sit. Use firm chairs, and sit up straight. ? Keep your remaining limb flat with your legs together while you are lying on your back. ? Lie on your stomach as much as possible to stretch your hip joint. ? Do not sit for more than an hour or two. Stand, or lie on your stomach now and then. ? Do not put pillows under your hips or knees or between your thighs. Follow-up care is a key part of your treatment and safety. Be sure to make and go to all appointments, and call your doctor if you are having problems. It's also a good idea to know your test results and keep a list of the medicines you take. When should you call for help? Call 911 anytime you think you may need emergency care. For example, call if:    · You passed out (lost consciousness).     · You have chest pain, are short of breath, or you cough up blood. Call your doctor now or seek immediate medical care if:    · You have pain that does not get better after you take pain medicine.     · You are sick to your stomach or cannot drink fluids.     · You have loose stitches, or your incision comes open.     · You have signs of a blood clot in your leg (called a deep vein thrombosis), such as:  ? Pain in your calf, back of the knee, thigh, or groin. ? Redness or swelling in your leg.     · You have signs of infection, such as:  ? Increased pain, swelling, warmth, or redness. ? Red streaks leading from the incision. ? Pus draining from the incision. ? A fever.     · You bleed through your bandage. Watch closely for any changes in your health, and be sure to contact your doctor if you have any problems. Where can you learn more?   Go to http://www.gray.com/  Enter [id number] in the search box to learn more about \"Above-the-Knee Leg Amputation: What to Expect at Home. \"  Current as of: July 1, 2021               Content Version: 13.2  © 2006-2022 [employer]. Care instructions adapted under license by [employer] (which disclaims liability or warranty for this information). If you have questions about a medical condition or this instruction, always ask your healthcare professional. [address] any warranty or liability for your use of this information. Patient Education        Peripheral Arterial Disease (PAD): Care Instructions  Overview  Peripheral arterial disease (PAD) occurs when the blood vessels (arteries) that supply blood to the legs, belly, pelvis, arms, or neck get narrow or blocked. This reduces blood flow to that area. The legs are affected most often. PAD is often caused by fatty buildup (plaque) in the arteries. This buildup is also called \"hardening\" of the arteries. Your risk of PAD increases if you smoke or have high cholesterol, high blood pressure, diabetes, or a family history of PAD. Many people don't have symptoms. If you do have symptoms, you may have weak or tired legs, difficulty walking or balancing, or pain. If you have pain, you might feel a tight, aching, or squeezing pain in the calf, foot, thigh, or buttock that occurs during exercise. The pain usually gets worse during exercise and goes away when you rest. If PAD gets worse, you may have symptoms of poor blood flow, such as leg pain when you rest.  Medicines and lifestyle changes may help your symptoms and lower your risk of heart attack and stroke. In some cases, surgery or other treatment is needed. It is important that you follow up with your doctor. Follow-up care is a key part of your treatment and safety. Be sure to make and go to all appointments, and call your doctor if you are having problems. It's also a good idea to know your test results and keep a list of the medicines you take.   How can you care for yourself at home? · Do not smoke. Smoking can make PAD worse. If you need help quitting, talk to your doctor about stop-smoking programs and medicines. These can increase your chances of quitting for good. · Take your medicines exactly as prescribed. Call your doctor if you think you are having a problem with your medicine. · If you take a blood thinner, such as aspirin, be sure to get instructions about how to take your medicine safely. Blood thinners can cause serious bleeding problems. · Ask your doctor if a cardiac rehab program is right for you. Cardiac rehab can help you make lifestyle changes. In cardiac rehab, a team of health professionals provides education and support to help you make new, healthy habits. · Eat heart-healthy foods such as fruits, vegetables, whole grains, fish, lean meats, and low-fat or nonfat dairy foods. Limit sodium, sugar, and alcohol. · If your doctor recommends it, get more exercise. Walking is a good choice. Bit by bit, increase the amount you walk every day. Try for at least 30 minutes on most days of the week. If you have symptoms when you exercise, ask your doctor about a special exercise program that may help relieve your symptoms. · Stay at a healthy weight. Lose weight if you need to. · Take good care of your feet. ? Treat cuts and scrapes on your legs right away. Poor blood flow prevents (or slows) quick healing of even small cuts or scrapes. This is even more important if you have diabetes. ? Avoid shoes that are too tight or that rub your feet. Shoes should be comfortable and fit well. ? Avoid socks or stockings that are tight enough to leave elastic-band marks on your legs. Tight socks can make circulation problems worse. ? Keep your feet clean and moisturized to prevent drying and cracking. Place cotton or lamb's wool between your toes to prevent rubbing and to absorb moisture.   ? If you have a sore on your leg or foot, keep it dry and cover it with a nonstick bandage until you see your doctor. When should you call for help? Call 911 anytime you think you may need emergency care. For example, call if:    · You have symptoms of a heart attack. These may include:  ? Chest pain or pressure, or a strange feeling in the chest.  ? Sweating. ? Shortness of breath. ? Nausea or vomiting. ? Pain, pressure, or a strange feeling in the back, neck, jaw, or upper belly or in one or both shoulders or arms. ? Lightheadedness or sudden weakness. ? A fast or irregular heartbeat. After you call 911, the  may tell you to chew 1 adult-strength or 2 to 4 low-dose aspirin. Wait for an ambulance. Do not try to drive yourself.    · You have sudden, severe leg pain, and your leg is cool and pale.     · You have symptoms of a stroke. These may include:  ? Sudden numbness, tingling, weakness, or loss of movement in your face, arm, or leg, especially on only one side of your body. ? Sudden vision changes. ? Sudden trouble speaking. ? Sudden confusion or trouble understanding simple statements. ? Sudden problems with walking or balance. ? A sudden, severe headache that is different from past headaches. Call your doctor now or seek immediate medical care if:    · You have leg pain that does not go away even if you rest.     · Your leg pain changes or gets worse. For example, if you have more pain with normal activity or the same pain with decreased activity, you should call.     · You have cold or numb feet or toes.     · You have leg or foot sores that are slow to heal.     · The skin on your legs or feet changes color.     · You have an open sore on your leg or foot that is infected. Signs of infection include:  ? Increased pain, swelling, warmth, or redness. ? Red streaks leading from the sore. ? Pus draining from the sore. ? A fever. Watch closely for changes in your health, and be sure to contact your doctor if you have any problems.   Where can you learn more?  Go to http://www.gray.com/  Enter H735 in the search box to learn more about \"Peripheral Arterial Disease (PAD): Care Instructions. \"  Current as of: January 10, 2022               Content Version: 13.2  © 2006-2022 [employer]. Care instructions adapted under license by [employer] (which disclaims liability or warranty for this information). If you have questions about a medical condition or this instruction, always ask your healthcare professional. [place] [address] any warranty or liability for your use of this information. Patient Education        Toe Amputation: What to Expect at [address] had amputation surgery to remove one or more of your toes. For most people, pain improves within a week after surgery. You may have stitches or sutures. The doctor will probably take these out about 10 days after the surgery. You may need to wear a cast or a special type of shoe for about 2 to 4 weeks. You may think you have feeling or pain where your toe had been. This is called phantom pain. It is common, and it may come and go for a year or longer. If you have this kind of pain, your doctor may prescribe medicine to treat it. This care sheet gives you a general idea about how long it will take for you to recover. But each person recovers at a different pace. Follow the steps below to get better as quickly as possible. How can you care for yourself at home? Activity    · Rest when you feel tired. Getting enough sleep will help you recover.     · Follow your doctor's instructions about how much weight you can put on your foot and when you can go back to your usual activities. If you were given crutches, use them as directed.     · Try to walk each day if you are able. Start by walking a little more than you did the day before. Bit by bit, increase the amount you walk. Walking boosts blood flow and helps prevent blood clots.   · You may notice some changes in your balance when you walk. Your balance will improve over time.     · Prop up your foot and leg on a pillow when you ice it or anytime you sit or lie down during the next 3 days. Try to keep it above the level of your heart. This will help reduce swelling.     · Ask your doctor when you can drive again.     · You may shower, unless your doctor tells you not to. Keep the bandage dry. If the bandage has been removed, you can wash the area with warm water and soap. Pat the area dry.     · You will probably need to take about 4 weeks off from work or your normal routine. How much time you need to take off depends on the type of work you do and your overall health. Diet    · You can eat your normal diet. If your stomach is upset, try bland, low-fat foods like plain rice, broiled chicken, toast, and yogurt.     · You may notice that your bowel movements are not regular right after your surgery. This is common. Try to avoid constipation and straining with bowel movements. You may want to take a fiber supplement every day. If you have not had a bowel movement after a couple of days, ask your doctor about taking a mild laxative. Medicines    · Your doctor will tell you if and when you can restart your medicines. You will also get instructions about taking any new medicines.     · If you take aspirin or some other blood thinner, ask your doctor if and when to start taking it again. Make sure that you understand exactly what your doctor wants you to do.     · Take pain medicines exactly as directed. ? If the doctor gave you a prescription medicine for pain, take it as prescribed. ? If you are not taking a prescription pain medicine, ask your doctor if you can take an over-the-counter medicine.     · If your doctor prescribed antibiotics, take them as directed. Do not stop taking them just because you feel better.  You need to take the full course of antibiotics.     · If you think your pain medicine is making you sick to your stomach:  ? Take your medicine after meals (unless your doctor has told you not to). ? Ask your doctor for a different pain medicine. Incision care    · Your doctor will probably remove the bandages after several days. Or your doctor may have you remove your bandages at home. Do not touch the surgery area. Keep it dry.     · Do not soak your foot until your doctor says it is okay. Follow-up care is a key part of your treatment and safety. Be sure to make and go to all appointments, and call your doctor if you are having problems. It's also a good idea to know your test results and keep a list of the medicines you take. When should you call for help? Call 911 anytime you think you may need emergency care. For example, call if:    · You passed out (lost consciousness).     · You have sudden chest pain, are short of breath, or you cough up blood. Call your doctor now or seek immediate medical care if:    · You have pain that does not get better after you take pain medicine.     · You are sick to your stomach or cannot drink fluids.     · You have loose stitches, or your incision comes open.     · You have signs of a blood clot in your leg (called a deep vein thrombosis), such as:  ? Pain in your calf, back of the knee, thigh, or groin. ? Redness or swelling in your leg.     · You have signs of infection, such as:  ? Increased pain, swelling, warmth, or redness. ? Red streaks leading from the incision. ? Pus draining from the incision. ? A fever.     · You bleed through your bandage. Watch closely for any changes in your health, and be sure to contact your doctor if you have any problems. Where can you learn more? Go to http://www.gray.com/  Enter X742 in the search box to learn more about \"Toe Amputation: What to Expect at Home. \"  Current as of: July 1, 2021               Content Version: 13.2  © 3153-8423 [employer]. Care instructions adapted under license by [employer] (which disclaims liability or warranty for this information). If you have questions about a medical condition or this instruction, always ask your healthcare professional. [address]  any warranty or liability for your use of this information. Patient Education      Apixaban (Eliquis) - (By mouth)   Why this medicine is used:   Treats and prevents blood clots. Contact a nurse or doctor right away if you have:  · Sudden or severe headache  · Back pain, numbness, tingling, weakness in your legs or feet  · Loss of bladder or bowel control  · Bloody vomit or vomit that looks like coffee grounds; bloody or black, tarry stools  · Bleeding that does not stop or bruises that do not heal     Common side effects:  · Minor bleeding or bruising  © 2017 [facility] Information is for End User's use only and may not be sold, redistributed or otherwise used for commercial purposes.

## 2022-03-16 NOTE — DISCHARGE SUMMARY
Physician Discharge Summary     Patient ID:  Glenna Marx  [id number]  18 y.o.  1960    Admit date: 2/28/2022    Discharge date: 3/16/2022      Admitting Physician: Fernie Mckinney MD     Discharge Physician: Fernie Mckinney MD     Admission Diagnoses: Occlusion of right femoral-popliteal bypass graft [facility]) [Q40.812R]    Discharge Diagnoses: There are no discharge diagnoses documented for the most recent discharge. Procedures for this admission: Procedure(s):  AMPUTATION RIGHT FOURTH AND FIFTH TOES PARTIAL METATARSAL AMPUTATION    Discharged Condition: stable    Hospital Course: Admitted to hospital with ischemic changes right lower extremity. Complex history with axillofemoral graft. Failed femoropopliteal bypass with multiple attempts to salvage. Patient had a surgical intervention with axillofemoral graft to anterior tibial artery extra-anatomic a pathway utilizing cadaver vein with good success. Had 1/5 toe amputation. Ischemia is resolved ischemic pain is resolved. He is resumed with physical therapy has become more independent he does live at home by himself. He has a left above-knee amputation with prosthesis. He has been able to walk the halls and stairs now feels stable for discharge. Consults: Cardiology, podiatry surgery    Significant Diagnostic Studies: angiography: Right leg revascularization    Treatments: surgery: Right axillary femoral graft to anterior tibial bypass with cadaver vein    Discharge Exam: LUNG: clear to auscultation bilaterally  HEART: S1, S2 normal  ABDOMEN:  no change  Bypass graft is nicely patent with robust Doppler signals. Seen by podiatry will be followed by Dr. Julee Vargas outpatient    Disposition: home    Patient Instructions:   Current Discharge Medication List      CONTINUE these medications which have NOT CHANGED    Details   metoprolol tartrate (LOPRESSOR) 50 mg tablet TAKE 1 TABLET BY MOUTH TWO TIMES A DAY.   Qty: 180 Tablet, Refills: 3 !! dilTIAZem ER (CARDIZEM CD) 120 mg capsule Take 1 Capsule by mouth nightly. Qty: 30 Capsule, Refills: 6      !! dilTIAZem ER (CARDIZEM CD) 180 mg capsule Take 1 tablet by mouth once every morning. Qty: 30 Capsule, Refills: 6      furosemide (LASIX) 20 mg tablet Take 1 Tablet by mouth daily. Qty: 90 Tablet, Refills: 1    Associated Diagnoses: Benign hypertensive heart disease with systolic congestive heart failure, NYHA class 2 ([facility] [address] Chronic systolic heart failure (HCC)      losartan (COZAAR) 100 mg tablet Take 1 Tablet by mouth daily. Qty: 90 Tablet, Refills: 3      atorvastatin (LIPITOR) 80 mg tablet TAKE 1 TABLET ONCE DAILY  Qty: 90 Tablet, Refills: 3      aspirin 81 mg chewable tablet Take 1 Tab by mouth daily. Qty: 90 Tab, Refills: 3      gabapentin (NEURONTIN) 400 mg capsule take 1 capsule by mouth three times a day  Qty: 270 Cap, Refills: 0      multivitamin (ONE A DAY) tablet Take 1 Tab by mouth daily. rivaroxaban (Xarelto) 20 mg tab tablet Take 1 Tablet by mouth daily. Qty: 30 Tablet, Refills: 6       !! - Potential duplicate medications found. Please discuss with provider. Reference discharge instructions as provided by nursing for diet, labs, medications, activity, wound care and any outpatient referrals.     Follow-up with Dr. Lupis Garsia:  Damien Buckner MD  3/16/2022  8:10 AM

## 2022-03-18 ENCOUNTER — HOME CARE VISIT (OUTPATIENT)
Dept: SCHEDULING | Facility: HOME HEALTH | Age: 62
End: 2022-03-18
Payer: COMMERCIAL

## 2022-03-18 ENCOUNTER — HOME CARE VISIT (OUTPATIENT)
Dept: HOME HEALTH SERVICES | Facility: HOME HEALTH | Age: 62
End: 2022-03-18
Payer: COMMERCIAL

## 2022-03-18 VITALS
DIASTOLIC BLOOD PRESSURE: 76 MMHG | HEART RATE: 70 BPM | TEMPERATURE: 97.7 F | OXYGEN SATURATION: 98 % | RESPIRATION RATE: 18 BRPM | SYSTOLIC BLOOD PRESSURE: 134 MMHG

## 2022-03-18 PROBLEM — T46.2X5A ADVERSE EFFECT OF AMIODARONE: Status: ACTIVE | Noted: 2017-08-19

## 2022-03-18 PROBLEM — I27.20 MODERATE TO SEVERE PULMONARY HYPERTENSION (HCC): Status: ACTIVE | Noted: 2017-07-23

## 2022-03-18 PROBLEM — I70.213 ATHEROSCLEROSIS OF NATIVE ARTERY OF BOTH LOWER EXTREMITIES WITH INTERMITTENT CLAUDICATION (HCC): Status: ACTIVE | Noted: 2017-01-25

## 2022-03-18 PROBLEM — D62 ACUTE BLOOD LOSS AS CAUSE OF POSTOPERATIVE ANEMIA: Status: ACTIVE | Noted: 2017-07-25

## 2022-03-18 PROCEDURE — G0299 HHS/HOSPICE OF RN EA 15 MIN: HCPCS

## 2022-03-18 PROCEDURE — A6446 CONFORM BAND S W>=3" <5"/YD: HCPCS

## 2022-03-18 PROCEDURE — A6197 ALGINATE DRSG >16 <=48 SQ IN: HCPCS

## 2022-03-18 PROCEDURE — 400013 HH SOC

## 2022-03-18 PROCEDURE — A9270 NON-COVERED ITEM OR SERVICE: HCPCS

## 2022-03-18 PROCEDURE — A6216 NON-STERILE GAUZE<=16 SQ IN: HCPCS

## 2022-03-18 PROCEDURE — A4452 WATERPROOF TAPE: HCPCS

## 2022-03-18 NOTE — HOME HEALTH
Skilled services/Home bound verification:     Skilled Reason for admission/summary of clinical condition:  right 4th and 5th toe amputation . This patient is homebound for the following reasons Requires considerable and taxing effort to leave the home . Caregiver: relative. Caregiver assists with  Family assists ADL's, medications, meals, transportation, errands. Medications reconciled and all medications are available in the home this visit. The following education was provided regarding medications:  SN instructed in possible adverse reactions to Lasix, including dehydration, orthostatic hypotension, loss of potassium and other electrolytes, weakness, and fatigue. Lasix is used to reduce extra fluid in the body (edema) caused by conditions such as heart failure, liver disease, and kidney disease. This can lessen symptoms such as shortness of breath and swelling in your arms, legs, and abdomen. This drug is also used to treat high blood pressure. Lowering high blood pressure helps prevent strokes, heart attacks, and kidney problems. Lasix is a water pill (diuretic) that causes you to make more urine. This helps your body get rid of extra water and salt. Medications  are effective at this time. High risk medication teaching regarding anticoagulants, hyperglycemic agents or opiod narcotics performed (specify) SN Instructed patient about the Eliquis ( apixaban ) this is helps to prevent that platelets in your blood from sticking together and forming a blood clot. Eliquis is used to lower the risk of stroke caused by a blood clot in people with a heart rhythm disorder called atrial fibrillation. Because Eliquis keeps your blood from coagulating ( clotting ) to prevent unwanted blood clots, this medicine can also make it easier for you to bleed, even from a minor injury such as a fall or a bump on the head. Do not stop taking Eliquis unless your doctor tells you to.  Stopping suddenly can increase your risk of blood clot or stroke      notified of any discrepancies/look a like medications/medication interactions na. Home health supplies by type and quantity ordered/delivered this visit include: ordered    Patient education provided this visit to include: Instructed in materials used in wound care. However, even with proper treatment, a wound infection may occur. Check the wound daily for signs of infection like increased drainage or bleeding from the wound that wont stop with direct pressure, redness in or around the wound, foul odor or pus coming from the wound, increased swelling around the wound and ever above 101.0°F or shaking chills. Skilled nurse instructed patient on safety measures to avoid injuries and falls such as keeping adequate lighting during the day and night and was educated on proper use of assistive device to prevent falls. Patient level of understanding of education provided: patient/caregiver verbalized 100% understanding. Sharps Education Provided: na  Patient response to procedure performed:  patient denied pain and discomfort during procedure/visit    Home exercise program/Homework provided: Sn instructed patient on pursed lip breathing. Pursed lip breathing is one of the simplest ways to control shortness of breath. It provides a quick and easy way to slow your pace of breathing, making each breath more effective. Pursed lip breathing: Improves ventilation, releases trapped air in the lungs, keeps the airways open longer and decreases the work of breathing, prolongs exhalation to slow the breathing rate, improves breathing patterns by moving old air out of the lungs and allowing for new air to enter the lungs, relieves shortness of breath, causes general relaxation. Practice this technique 4 - 5 times a day at first so you can get the correct breathing pattern.  Pursed lip breathing technique: Relax your neck and shoulder muscles, breathe in ( inhale ) slowly through your nose for two counts, keeping your mouth closed. Don't take a deep breath; a normal breath will do. It may help to count to yourself: inhale, one, two. Pucker or purse your lips as if you were going to whistle or gently flicker the flame of a candle. Breathe out ( exhale ) slowly and gently through your pursed lips while counting to four. It may help to count to yourself: exhale, one, two, three, four. Pt/Caregiver instructed on plan of care and are agreeable to plan of care at this time. Physician Fer Maldonado  notified of patient admission to home health and plan of care including anticipated frequency of 1w1, 3w8 and treatments/interventions/modalities of pt/ot. Discharge planning discussed with patient and caregiver. Discharge planning as follows: when goals met, patient/caregiver able to manage disease process, medications and pain    Pt/Caregiver did verbalize understanding of discharge planning. Next MD appointment Dr. Fer Maldonado 3/23. Patient/caregiver encouraged/instructed to keep appointment as lack of follow through with physician appointment could result in discontinuation of home care services for non-compliance.

## 2022-03-18 NOTE — Clinical Note
Patient admitted to home health services for wound care with SN frequency of 1w1, 3w8.  PT/OT for eval and treat

## 2022-03-19 PROBLEM — I74.09 AORTOILIAC OCCLUSIVE DISEASE (HCC): Status: ACTIVE | Noted: 2017-01-25

## 2022-03-19 PROBLEM — I73.9 PERIPHERAL ARTERY DISEASE (HCC): Status: ACTIVE | Noted: 2017-01-25

## 2022-03-19 PROBLEM — I99.8 ISCHEMIC REST PAIN OF LOWER EXTREMITY: Status: ACTIVE | Noted: 2017-08-14

## 2022-03-19 PROBLEM — I25.10 CORONARY ARTERY DISEASE INVOLVING NATIVE CORONARY ARTERY OF NATIVE HEART: Status: ACTIVE | Noted: 2017-03-15

## 2022-03-19 PROBLEM — L97.419 CHRONIC ULCER OF RIGHT HEEL (HCC): Status: ACTIVE | Noted: 2017-08-08

## 2022-03-19 PROBLEM — R94.31 PROLONGED Q-T INTERVAL ON ECG: Status: ACTIVE | Noted: 2017-08-19

## 2022-03-19 PROBLEM — I70.201 OCCLUSION OF RIGHT FEMORAL ARTERY (HCC): Status: ACTIVE | Noted: 2021-03-31

## 2022-03-19 PROBLEM — M79.606 ISCHEMIC REST PAIN OF LOWER EXTREMITY: Status: ACTIVE | Noted: 2017-08-14

## 2022-03-19 PROBLEM — I70.229 CRITICAL ISCHEMIA OF LOWER EXTREMITY (HCC): Status: ACTIVE | Noted: 2017-04-10

## 2022-03-19 PROBLEM — T82.898A: Status: ACTIVE | Noted: 2022-02-28

## 2022-03-19 PROBLEM — T82.7XXA INFECTION OF VASCULAR BYPASS GRAFT (HCC): Status: ACTIVE | Noted: 2017-07-24

## 2022-03-19 PROBLEM — I72.4 PSEUDOANEURYSM OF FEMORAL ARTERY (HCC): Status: ACTIVE | Noted: 2017-06-27

## 2022-03-20 PROBLEM — E07.81 EUTHYROID SICK SYNDROME: Status: ACTIVE | Noted: 2017-04-06

## 2022-03-20 PROBLEM — Z86.39 HISTORY OF VITAMIN D DEFICIENCY: Status: ACTIVE | Noted: 2017-04-22

## 2022-03-20 PROBLEM — Z48.812 AFTERCARE FOLLOWING SURGERY OF THE CIRCULATORY SYSTEM: Status: ACTIVE | Noted: 2017-07-25

## 2022-03-20 PROBLEM — Z95.828 STATUS POST FEMORAL-POPLITEAL BYPASS SURGERY: Status: ACTIVE | Noted: 2017-04-21

## 2022-03-20 PROBLEM — Z92.89 HISTORY OF CARDIOVERSION: Status: ACTIVE | Noted: 2017-04-18

## 2022-03-20 PROBLEM — Z74.09 IMPAIRED MOBILITY AND ADLS: Status: ACTIVE | Noted: 2017-07-24

## 2022-03-20 PROBLEM — Z47.81 AFTERCARE FOR AMPUTATION STUMP: Status: ACTIVE | Noted: 2017-08-18

## 2022-03-20 PROBLEM — Z98.890 HISTORY OF CARDIOVERSION: Status: ACTIVE | Noted: 2017-04-18

## 2022-03-20 PROBLEM — Z89.612 STATUS POST ABOVE-KNEE AMPUTATION OF LEFT LOWER EXTREMITY (HCC): Status: ACTIVE | Noted: 2017-08-18

## 2022-03-20 PROBLEM — Z78.9 IMPAIRED MOBILITY AND ADLS: Status: ACTIVE | Noted: 2017-07-24

## 2022-03-21 ENCOUNTER — HOME CARE VISIT (OUTPATIENT)
Dept: SCHEDULING | Facility: HOME HEALTH | Age: 62
End: 2022-03-21
Payer: COMMERCIAL

## 2022-03-21 PROCEDURE — G0300 HHS/HOSPICE OF LPN EA 15 MIN: HCPCS

## 2022-03-22 ENCOUNTER — OFFICE VISIT (OUTPATIENT)
Dept: FAMILY MEDICINE CLINIC | Age: 62
End: 2022-03-22
Payer: COMMERCIAL

## 2022-03-22 ENCOUNTER — HOME CARE VISIT (OUTPATIENT)
Dept: SCHEDULING | Facility: HOME HEALTH | Age: 62
End: 2022-03-22
Payer: COMMERCIAL

## 2022-03-22 ENCOUNTER — HOME CARE VISIT (OUTPATIENT)
Dept: HOME HEALTH SERVICES | Facility: HOME HEALTH | Age: 62
End: 2022-03-22
Payer: COMMERCIAL

## 2022-03-22 VITALS
TEMPERATURE: 97.5 F | SYSTOLIC BLOOD PRESSURE: 120 MMHG | OXYGEN SATURATION: 98 % | RESPIRATION RATE: 16 BRPM | HEART RATE: 84 BPM | DIASTOLIC BLOOD PRESSURE: 82 MMHG

## 2022-03-22 VITALS
RESPIRATION RATE: 16 BRPM | TEMPERATURE: 98.9 F | HEART RATE: 72 BPM | OXYGEN SATURATION: 100 % | WEIGHT: 183 LBS | HEIGHT: 70 IN | DIASTOLIC BLOOD PRESSURE: 64 MMHG | BODY MASS INDEX: 26.2 KG/M2 | SYSTOLIC BLOOD PRESSURE: 112 MMHG

## 2022-03-22 VITALS
HEART RATE: 110 BPM | DIASTOLIC BLOOD PRESSURE: 60 MMHG | OXYGEN SATURATION: 98 % | TEMPERATURE: 98.6 F | SYSTOLIC BLOOD PRESSURE: 112 MMHG

## 2022-03-22 DIAGNOSIS — E78.5 DYSLIPIDEMIA: ICD-10-CM

## 2022-03-22 DIAGNOSIS — Z89.612 STATUS POST ABOVE-KNEE AMPUTATION OF LEFT LOWER EXTREMITY (HCC): ICD-10-CM

## 2022-03-22 DIAGNOSIS — T82.898D OCCLUSION OF RIGHT FEMOROPOPLITEAL BYPASS GRAFT, SUBSEQUENT ENCOUNTER: ICD-10-CM

## 2022-03-22 DIAGNOSIS — I48.0 PAROXYSMAL ATRIAL FIBRILLATION (HCC): ICD-10-CM

## 2022-03-22 DIAGNOSIS — I73.9 PAD (PERIPHERAL ARTERY DISEASE) (HCC): Primary | ICD-10-CM

## 2022-03-22 DIAGNOSIS — S98.131A AMPUTATION OF TOE OF RIGHT FOOT (HCC): ICD-10-CM

## 2022-03-22 PROCEDURE — G0151 HHCP-SERV OF PT,EA 15 MIN: HCPCS

## 2022-03-22 PROCEDURE — 99214 OFFICE O/P EST MOD 30 MIN: CPT | Performed by: FAMILY MEDICINE

## 2022-03-22 RX ORDER — CILOSTAZOL 50 MG/1
TABLET ORAL
COMMUNITY
Start: 2022-03-13

## 2022-03-22 NOTE — HOME HEALTH
PT INITIAL EVALUATION    Past Medical Hx:    Occlusion of right femoral-popliteal bypass graft ([address] 02/28/2022    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[address] 03/31/2021    Skin ulcer of malleolar area of right ankle (HCC)      Prolonged Q-T interval on ECG 08/19/2017    Adverse effect of amiodarone 08/19/2017    Status post above-knee amputation of left lower extremity ([address] 08/18/2017    Aftercare for amputation stump 08/18/2017    Ischemic rest pain of lower extremity 08/14/2017    Chronic ulcer of right heel ([address] 08/08/2017    Chronic anemia      Acute blood loss as cause of postoperative anemia 07/25/2017    Aftercare following surgery of the circulatory system 07/25/2017    Impaired mobility and ADLs 07/24/2017    Infection of vascular bypass graft ([address] 07/24/2017    Moderate to severe pulmonary hypertension ([address] 07/ 23/2017    Pseudoaneurysm of femoral artery ([address] 06/27/2017    History of vitamin D deficiency 04/22/2017    Status post femoral-popliteal bypass surgery 04/21/2017    Ischemic cardiomyopathy      Chronic systolic heart failure (HCC)      Carotid artery disease ([address]      Dyslipidemia      History of cardioversion 04/18/2017    Paroxysmal atrial fibrillation ([address]      Critical ischemia of lower extremity ([address] 04/10/2017    Euthyroid sick syndrome 04/06/2017    Coronary artery disease involving native coronary artery of native heart 03/15/2017    Aortoiliac occlusive disease ([address] 01/25/2017    Atherosclerosis of native artery of both lower extremities with intermittent claudication ([address] 01/25/2017    Peripheral artery disease ([address] 01/25/2017    Hereditary peripheral neuropathy 11/15/2016    Erectile dysfunction 07/05/2016    Spinal stenosis of  lumbar region with radiculopathy 05/04/2015    Benign hypertensive heart disease with systolic congestive heart failure, NYHA class 2 ([address] 01/26/2015    DDD (degenerative disc disease), lumbar 01/26/2015     Recent H/o current illness:  57 year old male presents with MD referral for HHPT s/p hospitalization due to [facility] [address] METATARSAL AMPUTATION  Medication Management: patient manages own medications  Social hx and home eval:  Pt lives in single story home with daughter/sister help as needed. Caregiver Involvement: assist with medical appointments, medication management,   PLOF:  Pt PLOF is ambulation w SPC, pts base level of function independent with all ADL's  BALANCE:     Seated unsupported balance is good   Standing static balance is Fair  Standing dynamic balance is fair-  Tinetti 15 /28    Patient is at risk for falls due to recent surgery and hospitalization  BLE Strength:  Left Hip flexion 3+/5 , hip abduction 3+/5, hip adduction 3+/5, AKA  Right Hip flexion 3-/5 , hip abduction 3-/5, hip adduction 3-/5, Knee flexion 3+/5  knee extension 3+/5, ankle dorsiflexion 3-/5  BLE ROM:  Right hip/knee/ankle: [facility]  Left hip/knee/ankle: [facility]  Bed mobility:  independent   Transfers:  min A with sit<->stand for bed to chair, with crutchs/RW AD. min cues and instruction needed for safety and sequencing  GAIT:  Patient ambulated  50 ft  with crutches with 4 pointgait  on level  surfaces min/mod A. Pt demonstrates with decreased hip and knee flexion on LLE in pre and mid swing phase of gait as well as decreased stride-length and walter. Patient is unable to safely ambulate without assistance at this time. Pt required min cues for  safety and sequencing  Stairs: 3 steps with mod A and hand rail/AD  Patient education provided this visit: Safety with functional mobility and instruction with HEP.    Patient level of understanding of education provided: good with repeat verbalization  Patient response to treatment:  tolerated treatment well     Assessment: Referral for HHPT following recent hospitalization due to [address] AMPUTATION. HHPT is medically necessary to address the following clinical findings: decreased BLE strength and ROM, impaired gait, decreased I and safety with transfers and gait, decreased endurance, decreased balance and decreased safety in order to improve functional mobility and decrease fall risk. Pt is a fall risk as indicated by Tinetti score of 15/28. Patient will be seen for HHPT 2w4, 1w1, for therapeutic exercises to increase BLE strength and ROM,  training for gait, stairs and transfers to increase I and safety with daily functional mobility and balance and endurance activities to decrease fall risk, decrease impairment and increase functional mobility and independence in the home. Pt. requires skilled PT intervention to instruct Pt. with gait training with LRAD, ROM and strengthening, stair training, transfer training, balance training, manual therapy, neuromuscular re-education, patient care-giver education, HEP, endurance training, body mechanics. Instructed patient with HEP for RLE/core strengthening and left HEP handout for the patient. Patient was able to return demonstrate the exercises given. HEP includes:   Pt. received therapeutic exercises which included:  Squats, marching in place, Hip abd/add, toe raises and hip ext.  x 10, 3 times/day

## 2022-03-22 NOTE — PATIENT INSTRUCTIONS
A Healthy Heart: Care Instructions  Your Care Instructions     Coronary artery disease, also called heart disease, occurs when a substance called plaque builds up in the vessels that supply oxygen-rich blood to your heart muscle. This can narrow the blood vessels and reduce blood flow. A heart attack happens when blood flow is completely blocked. A high-fat diet, smoking, and other factors increase the risk of heart disease. Your doctor has found that you have a chance of having heart disease. You can do lots of things to keep your heart healthy. It may not be easy, but you can change your diet, exercise more, and quit smoking. These steps really work to lower your chance of heart disease. Follow-up care is a key part of your treatment and safety. Be sure to make and go to all appointments, and call your doctor if you are having problems. It's also a good idea to know your test results and keep a list of the medicines you take. How can you care for yourself at home? Diet    · Use less salt when you cook and eat. This helps lower your blood pressure. Taste food before salting. Add only a little salt when you think you need it. With time, your taste buds will adjust to less salt.     · Eat fewer snack items, fast foods, canned soups, and other high-salt, high-fat, processed foods.     · Read food labels and try to avoid saturated and trans fats. They increase your risk of heart disease by raising cholesterol levels.     · Limit the amount of solid fat-butter, margarine, and shortening-you eat. Use olive, peanut, or canola oil when you cook. Bake, broil, and steam foods instead of frying them.     · Eat a variety of fruit and vegetables every day. Dark green, deep orange, red, or yellow fruits and vegetables are especially good for you. Examples include spinach, carrots, peaches, and berries.     · Foods high in fiber can reduce your cholesterol and provide important vitamins and minerals.  High-fiber foods include whole-grain cereals and breads, oatmeal, beans, brown rice, citrus fruits, and apples.     · Eat lean proteins. Heart-healthy proteins include seafood, lean meats and poultry, eggs, beans, peas, nuts, seeds, and soy products.     · Limit drinks and foods with added sugar. These include candy, desserts, and soda pop. Lifestyle changes    · If your doctor recommends it, get more exercise. Walking is a good choice. Bit by bit, increase the amount you walk every day. Try for at least 30 minutes on most days of the week. You also may want to swim, bike, or do other activities.     · Do not smoke. If you need help quitting, talk to your doctor about stop-smoking programs and medicines. These can increase your chances of quitting for good. Quitting smoking may be the most important step you can take to protect your heart. It is never too late to quit.     · Limit alcohol to 2 drinks a day for men and 1 drink a day for women. Too much alcohol can cause health problems.     · Manage other health problems such as diabetes, high blood pressure, and high cholesterol. If you think you may have a problem with alcohol or drug use, talk to your doctor. Medicines    · Take your medicines exactly as prescribed. Call your doctor if you think you are having a problem with your medicine.     · If your doctor recommends aspirin, take the amount directed each day. Make sure you take aspirin and not another kind of pain reliever, such as acetaminophen (Tylenol). When should you call for help? Call 911 if you have symptoms of a heart attack. These may include:    · Chest pain or pressure, or a strange feeling in the chest.     · Sweating.     · Shortness of breath.     · Pain, pressure, or a strange feeling in the back, neck, jaw, or upper belly or in one or both shoulders or arms.     · Lightheadedness or sudden weakness.     · A fast or irregular heartbeat.    After you call 911, the  may tell you to chew 1 adult-strength or 2 to 4 low-dose aspirin. Wait for an ambulance. Do not try to drive yourself. Watch closely for changes in your health, and be sure to contact your doctor if you have any problems. Where can you learn more? Go to http://www.gomes.com/  Enter F075 in the search box to learn more about \"A Healthy Heart: Care Instructions. \"  Current as of: January 10, 2022               Content Version: 13.2  © 2006-2022 [employer]. Care instructions adapted under license by [employer] (which disclaims liability or warranty for this information). If you have questions about a medical condition or this instruction, always ask your healthcare professional. [address] any warranty or liability for your use of this information.

## 2022-03-22 NOTE — PROGRESS NOTES
1. Have you been to the ER, urgent care clinic since your last visit? Hospitalized since your last visit? No    2. Have you seen or consulted any other health care providers outside of the [address] [place] [address] since your last visit? Include any pap smears or colon screening.  No

## 2022-03-23 ENCOUNTER — HOME CARE VISIT (OUTPATIENT)
Dept: HOME HEALTH SERVICES | Facility: HOME HEALTH | Age: 62
End: 2022-03-23
Payer: COMMERCIAL

## 2022-03-23 ENCOUNTER — HOME CARE VISIT (OUTPATIENT)
Dept: SCHEDULING | Facility: HOME HEALTH | Age: 62
End: 2022-03-23
Payer: COMMERCIAL

## 2022-03-23 NOTE — PROGRESS NOTES
Chaz Edwards, 57 y.o.,  male    SUBJECTIVE  Ff-up admission R femoral artery occlusion s/p revascularization and amputation of R 4th-5th toes    Date of admission  2/28/2022-3/16/2022  Date of communication with NN - none    Reviewed discharge summary. Pt with known PAD, presented with ischemia, undergone axilloa femoral graft using cadaver tibial vein. And amputation of 4th-5th digits. Pain is fairly controlled on gabapentin. Has appt with podiatry tomorrow and vasc sx in 4 days. Home health has been established. s/p AKA Left leg and complicated revasculariation (2017).  Afib- reviewed notes placed on cardizem, metoprolol and now xarelto (?pruitus on eliquis) EF 55%    H/o CAD s/p angioplasty- continued on asa, BB.  plavix was discontinued Stress test 11/2020 low risk study.  following Dr. Ella Jackman    HTN- currently on cozaar, metoprolol      ROS:  See HPI, all others negative        Patient Active Problem List   Diagnosis Code    Benign hypertensive heart disease with systolic congestive heart failure, NYHA class 2 ([facility]) I11.0, I50.20    DDD (degenerative disc disease), lumbar M51.36    Erectile dysfunction N52.9    Spinal stenosis of lumbar region with radiculopathy M48.061, M54.16    Hereditary peripheral neuropathy G60.9    Aortoiliac occlusive disease ([address] I74.09    Atherosclerosis of native artery of both lower extremities with intermittent claudication ([address] I70.213    Peripheral artery disease ([address] I73.9    Coronary artery disease involving native coronary artery of native heart I25.10    Atrial fibrillation ([facility]) I48.91    Acute blood loss as cause of postoperative anemia D62    Impaired mobility and ADLs Z74.09, Z78.9    Ischemic cardiomyopathy I25.5    Chronic systolic heart failure ([facility]) I50.22    Carotid artery disease ([facility]) I77.9    Dyslipidemia E78.5    Euthyroid sick syndrome E07.81    Aftercare following surgery of the circulatory system Z48.812    Status post femoral-popliteal bypass surgery Z95.828    History of cardioversion Z98.890    Critical ischemia of lower extremity ([facility] I70.229    History of vitamin D deficiency Z86.39    Pseudoaneurysm of femoral artery ([facility]) I72.4    Infection of vascular bypass graft ([facility] T82. 7XXA    Chronic anemia D64.9    Chronic ulcer of right heel ([facility]) L97.419    Ischemic rest pain of lower extremity M79.606, I99.8    Prolonged Q-T interval on ECG R94.31    Status post above-knee amputation of left lower extremity ([facility] K23.192    Aftercare for amputation stump Z47.81    Moderate to severe pulmonary hypertension ([facility]) I27.20    Adverse effect of amiodarone T46.2X5A    Skin ulcer of malleolar area of right ankle ([facility]) L97.319    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[facility]) I70.201    Occlusion of right femoral-popliteal bypass graft ([facility]) T82.898A       Current Outpatient Medications   Medication Sig Dispense Refill    cilostazoL (PLETAL) 50 mg tablet TAKE 1 TABLET BY MOUTH 2 TIMES PER DAY 1/2 HOUR BEFORE OR 2 HOURS AFTER BREAKFAST AND DINNER      apixaban (ELIQUIS) 5 mg tablet Take 1 Tablet by mouth two (2) times a day. 60 Tablet 5    metoprolol tartrate (LOPRESSOR) 50 mg tablet TAKE 1 TABLET BY MOUTH TWO TIMES A DAY. 180 Tablet 3    dilTIAZem ER (CARDIZEM CD) 120 mg capsule Take 1 Capsule by mouth nightly. 30 Capsule 6    dilTIAZem ER (CARDIZEM CD) 180 mg capsule Take 1 tablet by mouth once every morning. 30 Capsule 6    furosemide (LASIX) 20 mg tablet Take 1 Tablet by mouth daily. 90 Tablet 1    losartan (COZAAR) 100 mg tablet Take 1 Tablet by mouth daily. 90 Tablet 3    atorvastatin (LIPITOR) 80 mg tablet TAKE 1 TABLET ONCE DAILY 90 Tablet 3    aspirin 81 mg chewable tablet Take 1 Tab by mouth daily. 90 Tab 3    gabapentin (NEURONTIN) 400 mg capsule take 1 capsule by mouth three times a day 270 Cap 0    multivitamin (ONE A DAY) tablet Take 1 Tab by mouth daily.          Allergies   Allergen Reactions    Morphine Other (comments)     Patient gets confused with morphine. Past Medical History:   Diagnosis Date    Acute blood loss as cause of postoperative anemia 4/4/2017    Anticoagulated on Coumadin     Aortoiliac occlusive disease (HCC) 1/25/2017    Atherosclerosis of native artery of both lower extremities with intermittent claudication ([address] 1/25/2017    Benign hypertensive heart disease with systolic congestive heart failure, NYHA class 2 ([address] 1/26/2015    Carotid artery disease (HCC)     Chronic anemia     Chronic systolic heart failure (HCC)     Chronic ulcer of right foot ([address] 4/4/2017    Chronic ulcer of right heel ([address] 8/8/2017    Coronary artery disease involving native coronary artery of native heart 3/15/2017    Successful stenting of Cx (Xience LESLEY) and RCA (Xience LESLEY) to 0% by Dr. Isabel Ibarra on 3/15/17.     DDD (degenerative disc disease), lumbar 1/26/2015    Dyslipidemia     Erectile dysfunction 7/5/2016    Euthyroid sick syndrome 4/6/2017    Hereditary peripheral neuropathy 11/15/2016    History of cardioversion 04/18/2017    S/P Synchronized external cardioversion (4/18/2017 - Dr. Tai Ngo)    History of vitamin D deficiency 4/22/2017    Vitamin D 25-Hydroxy (4/22/2017) = 12.0; Vitamin D 25-Hydroxy (5/26/2017) = 38.7    Infection of vascular bypass graft ([address] 7/24/2017    Left lower extremity    Ischemic cardiomyopathy     Moderate to severe pulmonary hypertension ([address] 07/23/2017    Paroxysmal atrial fibrillation (HCC)     Peripheral artery disease ([address] 1/25/2017    Skin ulcer of malleolar area of right ankle ([address]     Spinal stenosis of lumbar region with radiculopathy 5/4/2015    Dr. Sun Timmons       Social History     Socioeconomic History    Marital status: LEGALLY      Spouse name: Not on file    Number of children: Not on file    Years of education: Not on file    Highest education level: Not on file   Occupational History    Not on file Tobacco Use    Smoking status: Former Smoker    Smokeless tobacco: Never Used    Tobacco comment: quit in 2011   Substance and Sexual Activity    Alcohol use: Yes     Alcohol/week: 2.0 standard drinks     Types: 2 Cans of beer per week     Comment: 10 cans of beer a month    Drug use: Yes     Types: Marijuana     Comment: occasionally-last used 4/17    Sexual activity: Yes     Partners: Female   Other Topics Concern    Not on file   Social History Narrative    Not on file     Social Determinants of Health     Financial Resource Strain:     Difficulty of Paying Living Expenses: Not on file   Food Insecurity:     Worried About Running Out of Food in the Last Year: Not on file    [place] of Food in the Last Year: Not on file   Transportation Needs:     Lack of Transportation (Medical): Not on file    Lack of Transportation (Non-Medical):  Not on file   Physical Activity:     Days of Exercise per Week: Not on file    Minutes of Exercise per Session: Not on file   Stress:     Feeling of Stress : Not on file   Social Connections:     Frequency of Communication with Friends and Family: Not on file    Frequency of Social Gatherings with Friends and Family: Not on file    Attends Bahai Services: Not on file    Active Member of [address] [place] [address] [employer] or Organizations: Not on file    Attends Club or Organization Meetings: Not on file    Marital Status: Not on file   Intimate Partner Violence:     Fear of Current or Ex-Partner: Not on file    Emotionally Abused: Not on file    Physically Abused: Not on file    Sexually Abused: Not on file   Housing Stability:     Unable to Pay for Housing in the Last Year: Not on file    Number of [place] in the Last Year: Not on file    Unstable Housing in the Last Year: Not on file       Family History   Problem Relation Age of Onset    Heart Disease Father          OBJECTIVE    Physical Exam:     Visit Vitals  /64 (BP 1 Location: Left upper arm, BP Patient Position: Sitting, BP Cuff Size: Adult)   Pulse 72   Temp 98.9 °F (37.2 °C) (Oral)   Resp 16   Ht 5' 10\" (1.778 m)   Wt 183 lb (83 kg)   SpO2 100%   BMI 26.26 kg/m²       General: alert, chronically ill-appearing,on wheelchair, in no apparent distress or pain  Neck: supple, no adenopathy palpated  CVS: normal rate, IRIR, distinct S1 and S2  Lungs:clear to ausculation bilaterally, no crackles, wheezing or rhonchi noted  Abdomen: normoactive bowel sounds, soft, non-tender  Extremities: L AKA prosthestic in place, R foot bandage clean and dry  Skin: no edema  Psych:  mood and affect normal      Results for orders placed or performed during the hospital encounter of 02/28/22   COVID-19 RAPID TEST   Result Value Ref Range    Specimen source Nasopharyngeal      COVID-19 rapid test Not detected NOTD     METABOLIC PANEL, BASIC   Result Value Ref Range    Sodium 140 136 - 145 mmol/L    Potassium 3.4 (L) 3.5 - 5.5 mmol/L    Chloride 110 100 - 111 mmol/L    CO2 23 21 - 32 mmol/L    Anion gap 7 3.0 - 18 mmol/L    Glucose 96 74 - 99 mg/dL    BUN 14 7.0 - 18 MG/DL    Creatinine 0.89 0.6 - 1.3 MG/DL    BUN/Creatinine ratio 16 12 - 20      GFR est AA >60 >60 ml/min/1.73m2    GFR est non-AA >60 >60 ml/min/1.73m2    Calcium 9.2 8.5 - 10.1 MG/DL   CBC W/O DIFF   Result Value Ref Range    WBC 11.7 4.6 - 13.2 K/uL    RBC 3.49 (L) 4.35 - 5.65 M/uL    HGB 10.2 (L) 13.0 - 16.0 g/dL    HCT 30.8 (L) 36.0 - 48.0 %    MCV 88.3 78.0 - 100.0 FL    MCH 29.2 24.0 - 34.0 PG    MCHC 33.1 31.0 - 37.0 g/dL    RDW 15.9 (H) 11.6 - 14.5 %    PLATELET 170 (H) 815 - 420 K/uL    MPV 8.8 (L) 9.2 - 11.8 FL    NRBC 0.0 0  WBC    ABSOLUTE NRBC 0.00 0.00 - 0.01 K/uL   CBC WITH AUTOMATED DIFF   Result Value Ref Range    WBC 9.0 4.6 - 13.2 K/uL    RBC 2.93 (L) 4.35 - 5.65 M/uL    HGB 8.3 (L) 13.0 - 16.0 g/dL    HCT 26.1 (L) 36.0 - 48.0 %    MCV 89.1 78.0 - 100.0 FL    MCH 28.3 24.0 - 34.0 PG    MCHC 31.8 31.0 - 37.0 g/dL    RDW 16.3 (H) 11.6 - 14.5 % PLATELET 247 336 - 834 K/uL    MPV 8.9 (L) 9.2 - 11.8 FL    NRBC 0.0 0  WBC    ABSOLUTE NRBC 0.00 0.00 - 0.01 K/uL    NEUTROPHILS 69 40 - 73 %    LYMPHOCYTES 19 (L) 21 - 52 %    MONOCYTES 10 3 - 10 %    EOSINOPHILS 0 0 - 5 %    BASOPHILS 1 0 - 2 %    IMMATURE GRANULOCYTES 0 0.0 - 0.5 %    ABS. NEUTROPHILS 6.2 1.8 - 8.0 K/UL    ABS. LYMPHOCYTES 1.7 0.9 - 3.6 K/UL    ABS. MONOCYTES 0.9 0.05 - 1.2 K/UL    ABS. EOSINOPHILS 0.0 0.0 - 0.4 K/UL    ABS. BASOPHILS 0.1 0.0 - 0.1 K/UL    ABS. IMM. GRANS. 0.0 0.00 - 0.04 K/UL    DF AUTOMATED     METABOLIC PANEL, BASIC   Result Value Ref Range    Sodium 141 136 - 145 mmol/L    Potassium 3.1 (L) 3.5 - 5.5 mmol/L    Chloride 113 (H) 100 - 111 mmol/L    CO2 24 21 - 32 mmol/L    Anion gap 4 3.0 - 18 mmol/L    Glucose 104 (H) 74 - 99 mg/dL    BUN 8 7.0 - 18 MG/DL    Creatinine 0.71 0.6 - 1.3 MG/DL    BUN/Creatinine ratio 11 (L) 12 - 20      GFR est AA >60 >60 ml/min/1.73m2    GFR est non-AA >60 >60 ml/min/1.73m2    Calcium 7.9 (L) 8.5 - 10.1 MG/DL   MAGNESIUM   Result Value Ref Range    Magnesium 1.7 1.6 - 2.6 mg/dL   POTASSIUM   Result Value Ref Range    Potassium 3.9 3.5 - 5.5 mmol/L   MAGNESIUM   Result Value Ref Range    Magnesium 2.1 1.6 - 2.6 mg/dL   PTT   Result Value Ref Range    aPTT 36.1 23.0 - 36.4 SEC   CBC WITH AUTOMATED DIFF   Result Value Ref Range    WBC 9.4 4.6 - 13.2 K/uL    RBC 2.92 (L) 4.35 - 5.65 M/uL    HGB 8.6 (L) 13.0 - 16.0 g/dL    HCT 26.7 (L) 36.0 - 48.0 %    MCV 91.4 78.0 - 100.0 FL    MCH 29.5 24.0 - 34.0 PG    MCHC 32.2 31.0 - 37.0 g/dL    RDW 16.5 (H) 11.6 - 14.5 %    PLATELET 568 580 - 491 K/uL    MPV 8.9 (L) 9.2 - 11.8 FL    NRBC 0.0 0  WBC    ABSOLUTE NRBC 0.00 0.00 - 0.01 K/uL    NEUTROPHILS 66 40 - 73 %    LYMPHOCYTES 21 21 - 52 %    MONOCYTES 12 (H) 3 - 10 %    EOSINOPHILS 1 0 - 5 %    BASOPHILS 0 0 - 2 %    IMMATURE GRANULOCYTES 0 0.0 - 0.5 %    ABS. NEUTROPHILS 6.2 1.8 - 8.0 K/UL    ABS. LYMPHOCYTES 2.0 0.9 - 3.6 K/UL    ABS. MONOCYTES 1.1 0.05 - 1.2 K/UL    ABS. EOSINOPHILS 0.1 0.0 - 0.4 K/UL    ABS. BASOPHILS 0.0 0.0 - 0.1 K/UL    ABS. IMM. GRANS. 0.0 0.00 - 0.04 K/UL    DF AUTOMATED     PTT   Result Value Ref Range    aPTT 59.4 (H) 23.0 - 50.3 SEC   METABOLIC PANEL, BASIC   Result Value Ref Range    Sodium 139 136 - 145 mmol/L    Potassium 3.3 (L) 3.5 - 5.5 mmol/L    Chloride 111 100 - 111 mmol/L    CO2 25 21 - 32 mmol/L    Anion gap 3 3.0 - 18 mmol/L    Glucose 96 74 - 99 mg/dL    BUN 7 7.0 - 18 MG/DL    Creatinine 0.72 0.6 - 1.3 MG/DL    BUN/Creatinine ratio 10 (L) 12 - 20      GFR est AA >60 >60 ml/min/1.73m2    GFR est non-AA >60 >60 ml/min/1.73m2    Calcium 8.1 (L) 8.5 - 10.1 MG/DL   PTT   Result Value Ref Range    aPTT 102.5 (H) 23.0 - 36.4 SEC   MAGNESIUM   Result Value Ref Range    Magnesium 2.0 1.6 - 2.6 mg/dL   POTASSIUM   Result Value Ref Range    Potassium 4.0 3.5 - 5.5 mmol/L   PTT   Result Value Ref Range    aPTT 119.3 (H) 23.0 - 36.4 SEC   PTT   Result Value Ref Range    aPTT 68.3 (H) 23.0 - 36.4 SEC   PTT   Result Value Ref Range    aPTT 39.3 (H) 23.0 - 36.4 SEC   CBC WITH AUTOMATED DIFF   Result Value Ref Range    WBC 10.2 4.6 - 13.2 K/uL    RBC 3.05 (L) 4.35 - 5.65 M/uL    HGB 8.9 (L) 13.0 - 16.0 g/dL    HCT 27.1 (L) 36.0 - 48.0 %    MCV 88.9 78.0 - 100.0 FL    MCH 29.2 24.0 - 34.0 PG    MCHC 32.8 31.0 - 37.0 g/dL    RDW 16.2 (H) 11.6 - 14.5 %    PLATELET 243 295 - 346 K/uL    MPV 9.0 (L) 9.2 - 11.8 FL    NRBC 0.0 0  WBC    ABSOLUTE NRBC 0.00 0.00 - 0.01 K/uL    NEUTROPHILS 73 40 - 73 %    LYMPHOCYTES 16 (L) 21 - 52 %    MONOCYTES 10 3 - 10 %    EOSINOPHILS 1 0 - 5 %    BASOPHILS 0 0 - 2 %    IMMATURE GRANULOCYTES 0 0.0 - 0.5 %    ABS. NEUTROPHILS 7.4 1.8 - 8.0 K/UL    ABS. LYMPHOCYTES 1.6 0.9 - 3.6 K/UL    ABS. MONOCYTES 1.0 0.05 - 1.2 K/UL    ABS. EOSINOPHILS 0.1 0.0 - 0.4 K/UL    ABS. BASOPHILS 0.0 0.0 - 0.1 K/UL    ABS. IMM.  GRANS. 0.0 0.00 - 0.04 K/UL    DF AUTOMATED     METABOLIC PANEL, BASIC   Result Value Ref Range    Sodium 141 136 - 145 mmol/L    Potassium 3.6 3.5 - 5.5 mmol/L    Chloride 111 100 - 111 mmol/L    CO2 25 21 - 32 mmol/L    Anion gap 5 3.0 - 18 mmol/L    Glucose 93 74 - 99 mg/dL    BUN 7 7.0 - 18 MG/DL    Creatinine 0.75 0.6 - 1.3 MG/DL    BUN/Creatinine ratio 9 (L) 12 - 20      GFR est AA >60 >60 ml/min/1.73m2    GFR est non-AA >60 >60 ml/min/1.73m2    Calcium 9.0 8.5 - 10.1 MG/DL   PTT   Result Value Ref Range    aPTT 69.9 (H) 23.0 - 36.4 SEC   CBC WITH AUTOMATED DIFF   Result Value Ref Range    WBC 10.1 4.6 - 13.2 K/uL    RBC 3.02 (L) 4.35 - 5.65 M/uL    HGB 8.8 (L) 13.0 - 16.0 g/dL    HCT 26.7 (L) 36.0 - 48.0 %    MCV 88.4 78.0 - 100.0 FL    MCH 29.1 24.0 - 34.0 PG    MCHC 33.0 31.0 - 37.0 g/dL    RDW 16.2 (H) 11.6 - 14.5 %    PLATELET 792 002 - 139 K/uL    MPV 9.2 9.2 - 11.8 FL    NRBC 0.0 0  WBC    ABSOLUTE NRBC 0.00 0.00 - 0.01 K/uL    NEUTROPHILS 72 40 - 73 %    LYMPHOCYTES 17 (L) 21 - 52 %    MONOCYTES 10 3 - 10 %    EOSINOPHILS 1 0 - 5 %    BASOPHILS 0 0 - 2 %    IMMATURE GRANULOCYTES 0 0.0 - 0.5 %    ABS. NEUTROPHILS 7.4 1.8 - 8.0 K/UL    ABS. LYMPHOCYTES 1.7 0.9 - 3.6 K/UL    ABS. MONOCYTES 1.0 0.05 - 1.2 K/UL    ABS. EOSINOPHILS 0.1 0.0 - 0.4 K/UL    ABS. BASOPHILS 0.0 0.0 - 0.1 K/UL    ABS. IMM.  GRANS. 0.0 0.00 - 0.04 K/UL    DF AUTOMATED     PTT   Result Value Ref Range    aPTT 82.3 (H) 23.0 - 36.4 SEC   CBC WITH AUTOMATED DIFF   Result Value Ref Range    WBC 7.9 4.6 - 13.2 K/uL    RBC 3.11 (L) 4.35 - 5.65 M/uL    HGB 9.0 (L) 13.0 - 16.0 g/dL    HCT 27.8 (L) 36.0 - 48.0 %    MCV 89.4 78.0 - 100.0 FL    MCH 28.9 24.0 - 34.0 PG    MCHC 32.4 31.0 - 37.0 g/dL    RDW 16.5 (H) 11.6 - 14.5 %    PLATELET 898 804 - 928 K/uL    MPV 9.3 9.2 - 11.8 FL    NRBC 0.0 0  WBC    ABSOLUTE NRBC 0.00 0.00 - 0.01 K/uL    NEUTROPHILS 60 40 - 73 %    LYMPHOCYTES 26 21 - 52 %    MONOCYTES 12 (H) 3 - 10 %    EOSINOPHILS 2 0 - 5 %    BASOPHILS 1 0 - 2 %    IMMATURE GRANULOCYTES 0 0.0 - 0.5 % ABS. NEUTROPHILS 4.7 1.8 - 8.0 K/UL    ABS. LYMPHOCYTES 2.1 0.9 - 3.6 K/UL    ABS. MONOCYTES 0.9 0.05 - 1.2 K/UL    ABS. EOSINOPHILS 0.1 0.0 - 0.4 K/UL    ABS. BASOPHILS 0.0 0.0 - 0.1 K/UL    ABS. IMM. GRANS. 0.0 0.00 - 0.04 K/UL    DF AUTOMATED     PTT   Result Value Ref Range    aPTT 43.2 (H) 23.0 - 36.4 SEC   CBC WITH AUTOMATED DIFF   Result Value Ref Range    WBC 9.1 4.6 - 13.2 K/uL    RBC 3.11 (L) 4.35 - 5.65 M/uL    HGB 9.2 (L) 13.0 - 16.0 g/dL    HCT 27.6 (L) 36.0 - 48.0 %    MCV 88.7 78.0 - 100.0 FL    MCH 29.6 24.0 - 34.0 PG    MCHC 33.3 31.0 - 37.0 g/dL    RDW 16.3 (H) 11.6 - 14.5 %    PLATELET 389 (H) 811 - 420 K/uL    MPV 9.1 (L) 9.2 - 11.8 FL    NRBC 0.0 0  WBC    ABSOLUTE NRBC 0.00 0.00 - 0.01 K/uL    NEUTROPHILS 65 40 - 73 %    LYMPHOCYTES 21 21 - 52 %    MONOCYTES 11 (H) 3 - 10 %    EOSINOPHILS 2 0 - 5 %    BASOPHILS 0 0 - 2 %    IMMATURE GRANULOCYTES 1 (H) 0.0 - 0.5 %    ABS. NEUTROPHILS 5.9 1.8 - 8.0 K/UL    ABS. LYMPHOCYTES 1.9 0.9 - 3.6 K/UL    ABS. MONOCYTES 1.0 0.05 - 1.2 K/UL    ABS. EOSINOPHILS 0.2 0.0 - 0.4 K/UL    ABS. BASOPHILS 0.0 0.0 - 0.1 K/UL    ABS. IMM.  GRANS. 0.1 (H) 0.00 - 0.04 K/UL    DF AUTOMATED     METABOLIC PANEL, BASIC   Result Value Ref Range    Sodium 137 136 - 145 mmol/L    Potassium 4.2 3.5 - 5.5 mmol/L    Chloride 107 100 - 111 mmol/L    CO2 29 21 - 32 mmol/L    Anion gap 1 (L) 3.0 - 18 mmol/L    Glucose 94 74 - 99 mg/dL    BUN 13 7.0 - 18 MG/DL    Creatinine 0.97 0.6 - 1.3 MG/DL    BUN/Creatinine ratio 13 12 - 20      GFR est AA >60 >60 ml/min/1.73m2    GFR est non-AA >60 >60 ml/min/1.73m2    Calcium 9.5 8.5 - 10.1 MG/DL   DIGOXIN   Result Value Ref Range    Digoxin level 1.0 0.9 - 2.0 NG/ML   CBC WITH AUTOMATED DIFF   Result Value Ref Range    WBC 11.4 4.6 - 13.2 K/uL    RBC 3.78 (L) 4.35 - 5.65 M/uL    HGB 11.0 (L) 13.0 - 16.0 g/dL    HCT 34.2 (L) 36.0 - 48.0 %    MCV 90.5 78.0 - 100.0 FL    MCH 29.1 24.0 - 34.0 PG    MCHC 32.2 31.0 - 37.0 g/dL    RDW 16.0 (H) 11.6 - 14.5 %    PLATELET 502 (H) 389 - 420 K/uL    MPV 9.1 (L) 9.2 - 11.8 FL    NRBC 0.0 0  WBC    ABSOLUTE NRBC 0.00 0.00 - 0.01 K/uL    NEUTROPHILS 68 40 - 73 %    LYMPHOCYTES 16 (L) 21 - 52 %    MONOCYTES 11 (H) 3 - 10 %    EOSINOPHILS 4 0 - 5 %    BASOPHILS 1 0 - 2 %    IMMATURE GRANULOCYTES 0 0.0 - 0.5 %    ABS. NEUTROPHILS 7.7 1.8 - 8.0 K/UL    ABS. LYMPHOCYTES 1.9 0.9 - 3.6 K/UL    ABS. MONOCYTES 1.3 (H) 0.05 - 1.2 K/UL    ABS. EOSINOPHILS 0.4 0.0 - 0.4 K/UL    ABS. BASOPHILS 0.1 0.0 - 0.1 K/UL    ABS. IMM.  GRANS. 0.1 (H) 0.00 - 0.04 K/UL    DF AUTOMATED     METABOLIC PANEL, BASIC   Result Value Ref Range    Sodium 138 136 - 145 mmol/L    Potassium 4.6 3.5 - 5.5 mmol/L    Chloride 108 100 - 111 mmol/L    CO2 23 21 - 32 mmol/L    Anion gap 7 3.0 - 18 mmol/L    Glucose 91 74 - 99 mg/dL    BUN 17 7.0 - 18 MG/DL    Creatinine 0.88 0.6 - 1.3 MG/DL    BUN/Creatinine ratio 19 12 - 20      GFR est AA >60 >60 ml/min/1.73m2    GFR est non-AA >60 >60 ml/min/1.73m2    Calcium 9.4 8.5 - 10.1 MG/DL   POC ACTIVATED CLOTTING TIME   Result Value Ref Range    Activated Clotting Time (POC) 166 (H) 79 - 138 SECS   POC ACTIVATED CLOTTING TIME   Result Value Ref Range    Activated Clotting Time (POC) 208 (H) 79 - 138 SECS   POC ACTIVATED CLOTTING TIME   Result Value Ref Range    Activated Clotting Time (POC) 196 (H) 79 - 138 SECS   GLUCOSE, POC   Result Value Ref Range    Glucose (POC) 117 (H) 70 - 110 mg/dL   EKG, 12 LEAD, SUBSEQUENT   Result Value Ref Range    Ventricular Rate 142 BPM    QRS Duration 88 ms    Q-T Interval 314 ms    QTC Calculation (Bezet) 483 ms    Calculated R Axis -2 degrees    Calculated T Axis -86 degrees    Diagnosis       Atrial fibrillation with rapid ventricular response  Nonspecific ST and T wave abnormality  Abnormal ECG  When compared with ECG of 15-DEC-2021 08:32,  T wave inversion now evident in Lateral leads  Confirmed by Mabel Early MD, Felipe Bedoya [id number]) on 3/1/2022 12:31:15 PM     TYPE & SCREEN Result Value Ref Range    Crossmatch Expiration 03/03/2022,2359     ABO/Rh(D) O POSITIVE     Antibody screen NEG    TYPE & SCREEN   Result Value Ref Range    Crossmatch Expiration 03/07/2022,2359     ABO/Rh(D) O POSITIVE     Antibody screen NEG    ECHO ADULT COMPLETE   Result Value Ref Range    IVSd 0.8 0.6 - 1.0 cm    LVIDd 5.3 4.2 - 5.9 cm    LVIDs 4.8 cm    LVOT Diameter 2.0 cm    LVPWd 1.0 0.6 - 1.0 cm    LVOT SV 33.3 ml    LVOT Peak Gradient 2 mmHg    LVOT Mean Gradient 1 mmHg    LVOT Peak Velocity 0.8 m/s    LVOT VTI 10.6 cm    LA Volume A/L 89 mL    LA Volume 2C 102 (A) 18 - 58 mL    LA Volume 4C 59 (A) 18 - 58 mL    TAPSE 1.2 (A) 1.5 - 2.0 cm    TR Peak Gradient 15 mmHg    TR Max Velocity 1.95 m/s    Aortic Root 3.6 cm    Fractional Shortening 2D 9 28 - 44 %    LVIDd Index 2.68 cm/m2    LVIDs Index 2.42 cm/m2    LV RWT Ratio 0.38     LV Mass 2D 174.5 88 - 224 g    LV Mass 2D Index 88.1 49 - 115 g/m2    LA Volume Index A/L 45 16 - 34 mL/m2    LVOT Stroke Volume Index 16.8 mL/m2    LVOT Area 3.1 cm2    LA Volume Index 2C 52 (A) 16 - 34 mL/m2    LA Volume Index 4C 30 16 - 34 mL/m2    Ao Root Index 1.82 cm/m2    PASP 23 mmHg       ASSESSMENT/PLAN  Diagnoses and all orders for this visit:    Peripheral artery disease (HCC)  S/p thrombectomy/balloon angioplasty 12/2021  S/p AKA 2017 L  S/p amputation R 4-5th toes and graft repair 2/2022  On asa,statin  Following vasc sx    Paroxysmal atrial fibrillation (HCC)  Rate controlled, anticoagulated  On xarelto, ccb, BB       CBC WITH AUTOMATED DIFF; Future  -     METABOLIC PANEL, COMPREHENSIVE;  Future  Following cardiology    Dyslipidemia  LDL goal <70  Cont statin  Lipid panel 12/2021    Atherosclerosis of native artery of both lower extremities with intermittent claudication (HCC)  On asa, BB    Status post above knee amputation Left lower extremity (HCC)     Amputation of toe of right foot (HCC)    Occlusion of right femoropopliteal bypass graft, subsequent encounter          Follow-up and Dispositions    · Return in about 6 months (around 9/22/2022), or if symptoms worsen or fail to improve, for routine chronic illness care. Patient understands plan of care. Patient has provided input and agrees with goals.

## 2022-03-23 NOTE — HOME HEALTH
SN reason for visit: wound care     Caregiver involvement: Patient's cg is brother and he is available if needed for assistance with iadls, adls, meal prep, medication management, taking to md appointments. .    Medications reconciled and all medications are available in the home this visit. The following education was provided regarding medications, medication interactions, and look a like medications: educated on benefits/risk of Eliquis. Medications  are effective at this time. Home health supplies by type and quantity ordered/delivered this visit include: supplies ordered on admission. Patient education/skilled care provided this visit:  wound care performed per orders- old dressing completely removed, wound cleansed with dwc and applied calcium aquacell, secured with gauze and kerlix. dressing changed as ordered, healing well with no s/s of infection present. pt aware to keep dressing clean, dry and intact as ordered. pt aware to keep incision clean and dry as ordered, to report any new drainage to staff. patient made aware to monitor for s/s of infection [increased swelling, increased redness around site, increased pain, foul smelling drainage, fever] aware who to report to/when.  patient encouraged to monitor for increase in pain and to continue with current pain management and to notify staff/md if pain becomes excrutiating/intolerable. pt instructed to follow a high protein diet for healing- to try to get 90g protein daily. patient instructed to maintain clear pathways in home and to minimize clutter to prevent falls from occurring/minimize fall potential.  patient/cg to continue to take medications as prescribed. patient aware to monitor for effectiveness and to notify staff of any adverse reactions to medications/any changes to medication regimen.   reviewed side effects, purposes, dosage, frequencies  patient to follow Eliquis regimen as directed by MD and to monitor for s/s of excessive bleeding, aware who to report to/when. PATIENT TAKING ELIQUIS, EDUCATED ON THE BLEEDING PRECAUTIONS, BRUSING, BLEEDING GUMS, BLACK TARY STOOLS, BLOODY STOOLS. Patient is a fall risk. Educated pateint to sit on the side of the chair/bed, take a slow deep breaths, have feet firmly planted before standing up, use cane/walker if available, or have someone to assist.  INSTRUCTED PATIENT AND CG THAT SHOULD ANY NEEDS OR CONCERNS ARISE TO FIRST CALL OUR OFFICE, OR THE DR'S OFFICE  OR GO TO AN URGENT CARE CENTER AND NOT TO THE ED FOR NON-LIFE THREATENING EVENTS. IF IT IS LIFE THREATENING THEN CALL 911 OR GO TO THE CLOSEST ER. Patient level of understanding of education provided: Pt verbalized understanding of anticoagulant education provided today with no questions/concerns at this time. Patient response to procedure performed:  Pt tolerated wound care well with no discomfort/pain observed or voiced at this time. Progress toward goals: Patient's personal goal is to have complete wound healing and remain free of infection. Home exercise program: activity as tolerated, trying to get physical activity 4-5 x weekly, 30 minutes daily. stopping activity if causing shortness of breath or chest pain, dizziness or weakness. Continued need for the following skills: Nursing, Physical Therapy(?) and Occupational Therapy    The following discharge planning was discussed with the pt/caregiver: Patient will be discharged once education has completed, patient is medically stable and pt/cg are able to independently manage wound care/ wound has healed or no longer requires skilled care.

## 2022-03-24 ENCOUNTER — HOME CARE VISIT (OUTPATIENT)
Dept: SCHEDULING | Facility: HOME HEALTH | Age: 62
End: 2022-03-24
Payer: COMMERCIAL

## 2022-03-24 VITALS
OXYGEN SATURATION: 98 % | DIASTOLIC BLOOD PRESSURE: 63 MMHG | TEMPERATURE: 96.9 F | SYSTOLIC BLOOD PRESSURE: 126 MMHG | HEART RATE: 74 BPM

## 2022-03-24 VITALS
TEMPERATURE: 98.3 F | HEART RATE: 83 BPM | DIASTOLIC BLOOD PRESSURE: 63 MMHG | SYSTOLIC BLOOD PRESSURE: 138 MMHG | OXYGEN SATURATION: 99 %

## 2022-03-24 PROCEDURE — G0152 HHCP-SERV OF OT,EA 15 MIN: HCPCS

## 2022-03-24 PROCEDURE — G0157 HHC PT ASSISTANT EA 15: HCPCS

## 2022-03-24 NOTE — HOME HEALTH
Recent H/o current illness: 57year-old male presents with MD referral for [facility] s/p hospitalization due to [address]    Past Medical Hx:  Occlusion of right femoral-popliteal bypass graft ([address] 02/28/2022  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[address] 03/31/2021  Skin ulcer of malleolar area of right ankle (HCC)  Prolonged Q-T interval on ECG 08/19/2017  Adverse effect of amiodarone 08/19/2017 Status post above-knee amputation of left lower extremity ([address] 08/18/2017  Aftercare for amputation stump 08/18/2017  Ischemic rest pain of lower extremity 08/14/2017  Chronic ulcer of right heel ([address] 08/08/2017  Chronic anemia  Acute blood loss as cause of postoperative anemia 07/25/2017  Aftercare following surgery of the circulatory system 07/25/2017  Impaired mobility and ADLs 07/24/2017  Infection of vascular bypass graft ([address] 07/24/2017  Moderate to severe pulmonary hypertension ([address] 07/ 23/2017  Pseudoaneurysm of femoral artery ([address] 06/27/2017  History of vitamin D deficiency 04/22/2017  Status post femoral-popliteal bypass surgery 04/21/2017  Ischemic cardiomyopathy  Chronic systolic heart failure (HCC)  Carotid artery disease ([address]  Dyslipidemia  History of cardioversion 04/18/2017  Paroxysmal atrial fibrillation ([address]  Critical ischemia of lower extremity ([address] 04/10/2017  Euthyroid sick syndrome 04/06/2017  Coronary artery disease involving native coronary artery of native heart 03/15/2017  Aortoiliac occlusive disease ([address] 01/25/2017  Atherosclerosis of native artery of both lower extremities with intermittent claudication ([address] 01/25/2017  Peripheral artery disease ([address] 01/25/2017  Hereditary peripheral neuropathy 11/15/2016  Erectile dysfunction 07/05/2016  Spinal stenosis of lumbar region with radiculopathy 05/04/2015  Benign hypertensive heart disease with systolic congestive heart failure, NYHA class 2 ([address] 01/26/2015  DDD (degenerative disc disease), lumbar 01/26/2015    Medications: Pt with no medication changes reported. Pt educated to continue as directed per MD.  Caregiver Involvement: Pt daughter and sister assist with medical appointments and medication management. PLOF: Pt lives in a 1 story house with his daughter and sister available as needed. Pt PLOF is ambulation with single point cane. Pt was independent with all his ADLs. Pt was previously working with construction. SUBJECTIVE: Pt reports he just finsihed getting dressed and and doing the dishes. DME ORDERED/RECOMMENEDED: N/A    OBJECTIVE:  BATHING:Pt has tub shower unit with shower chair. Pt modified independent with bathing using tub shower chair. TOILETING: Pt modified independent with toileting task with pericare and brief managment  UB DRESSING: Pt independent with upper body dressing   LB DRESSING: Pt modified indepednent for upper body dressing to jasmeet pants, shoes and hie left prostetic   GROOMING: Pt independent for grooming including oral care, hair care and facewashing. FEEDING: Pt independent for self feeding. Modified Alexander RPE 6/10 after performing ambulation, transfers, and I/ADL assessment     OT instructed/demonstrated pt the following with good understanding:    IADL: Pt able to complete light meal prep and cleaning. Pt family assists with transportation and shopping. AMBULATION: Pt uses wheelchair for the majority of his functional mobility due to 11year old L AKA. Pt has left prothsetic leg. Pt is able to ambulate short house hold distances using his rolling walker and L prostetic however reports increased pain and pressure in his R foot when placed in dependent position. Pt prefers wheelchair for home mobiliy to allow for elevation of his R foot. EOB/BED TRANSFER: Pt modified independent bed mobility with all areas. COUCH: Pt modified independent with sit to stands from couch and wheelchair.   TOILET: Pt modified indpendent with toilet transfers with all aspects. TUB SHOWER: Pt modified independent with tub shower transfers. PATIENT RESPONSE TO TREATMENT: Pt responded well to home health occuaptional therapy assessment with pt reporting increased use of wheelchair to since surgery to reduce pain in his R foot. PATIENT EDUCATION PROVIDED THIS VISIT: OT role, energy conservation, fall prevention/safety training ADL/IADL tasks, continue diet and medications as instructed per MD, consult MD or urgent care for medical assistance as opposed to ER unless situation emergent. PATIENT LEVEL OF UNDERSTANDING OF EDUCATION PROVIDED: Pt able to teach back role of occupational therapy session with good understanding. Pt able to teacha back and demonstrate good safety awarness of fall risks and safety hazards. One example is pair of scizzors in pt bathroom where he places his hand near for transfers. REHAB POTENTIAL: Pt reports feeling near his functional baseline with modified independece with ADLs. Pt reports only IADL tasks he currently needs assistance with are onces that requiring driving like transportation to MD appointments and shopping. Pt able to demonstrate modified independence with house hold mobility during bahtroom mobility and wheelchair use. Pt pain in right foot from recent surgery limiting his standing tolerance. Pt with no skilled home health occupational therapy needs with pt near his baseline level of independece with pt agreeing and expressing no skilled occupational therapy needs. HOME EXERCISE PROGRAM: N/A    ASSESSMENT: Pt presents with great BUE gross arm strength with pt presenting with 5/5 strength in his BUE assisting him heavily with functional mobility due to his old L AKA and new surgery on his R foot. Pt able to demonstrate modified indepenedce with lower body dressing including donning/doffing his prothestic x3.  Pt able to demonstrate set up and completion of toilet and tub transfers with modified independence. Pt with deficit in standing tolerance as a result of increased pain in RLE when in dependent position however not impacting pt independence with ADLs and IADLs in the home with pt able to complete the majority in his wheelchair and complete the remainder standing for brief moments. Pt with no further skilled home health occupational therapy needs. Skilled Care Provided: Pt completed full occupational therapy assessment to include balance, coordination, strengthening, ADL education/training, functional mobility and home safety.     PLAN: D/C 1w1 with pt to discharge home with home health PT and skilled nursing

## 2022-03-24 NOTE — HOME HEALTH
SUBJECTIVE: Patient reporting he is \"moving around pretty good\"    CAREGIVER INVOLVEMENT/ASSISTANCE NEEDED FOR: patient's daughter shops for groceries, patient does not have a caregiver in the home. HOME HEALTH SUPPLIES BY TYPE AND QUANTITY ORDERED/DELIVERED THIS VISIT INCLUDE: patient has bandage supplies ordered by SN    OBJECTIVE:  See interventions. PATIENT RESPONSE TO TREATMENT: Mild fatigue with standing therexs. Pt tool 2 breif rest breaks seated on 4ww seat. PATIENT LEVEL OF UNDERSTANDING OF EDUCATION PROVIDED: Patient is able to demonstrate compliance with written HEP as instructed this treatment and provided by PT at initial evaluation. Patient understands s/s of infection and maintaining integrity of bandage on R foot. ASSESSMENT OF PROGRESS TOWARD GOALS:  Patient progressing towards goals as previously established in POC with home health physical therapy at this time. No noted signs/symptoms of infection today bandage on R foot is intact. . Displayed improving gait form overall using 4ww in the home 2x40. Patient was able to complete new standing exercises with some mild fatigue but no reports of increased pain. Plan:  Discharge planning discussed at this visit regarding eventual discharge to outpatient PT services once patient has met goals and met maximum benefit from home health skilled PT services. Continue towards goals per POC as previously established. Continued need for skilled home health PT at this time to address deficits, reduce risk of falls, and obtain goals as previously established per POC. Continue with strengthning exs to allow increased ease of gait as patient plans to return to work as a .

## 2022-03-24 NOTE — Clinical Note
Pt reports feeling near his functional baseline with modified independece with ADLs. Pt reports only IADL tasks he currently needs assistance with are onces that requiring driving like transportation to MD appointments and shopping. Pt able to demonstrate modified independence with house hold mobility during bahtroom mobility and wheelchair use. Pt pain in right foot from recent surgery limiting his standing tolerance. Pt with no skilled home health occupational therapy needs with pt near his baseline level of independece with pt agreeing and expressing no skilled occupational therapy needs.      PLAN: D/C 1w1, 2w2 with pt to discharge home with home health PT and skilled nursing

## 2022-03-24 NOTE — Clinical Note
Therapy Functional Score Assessment  Question                                            Score   Grooming                            0       Upper Dressing   0      Lower Dressing   0      Bathing                 1      Toilet Transfer                   0    Transfer                1            Ambulation                         4    vs     3   Dyspnea                     1       Pain Interfering with activity        3  Est number therapy visits      1     Pt reports feeling near his functional baseline with modified independece with ADLs. Pt reports only IADL tasks he currently needs assistance with are onces that requiring driving like transportation to MD appointments and shopping. Pt able to demonstrate modified independence with house hold mobility during bahtroom mobility and wheelchair use. Pt pain in right foot from recent surgery limiting his standing tolerance. Pt with no skilled home health occupational therapy needs with pt near his baseline level of independece with pt agreeing and expressing no skilled occupational therapy needs.

## 2022-03-25 ENCOUNTER — HOME CARE VISIT (OUTPATIENT)
Dept: SCHEDULING | Facility: HOME HEALTH | Age: 62
End: 2022-03-25
Payer: COMMERCIAL

## 2022-03-28 ENCOUNTER — HOME CARE VISIT (OUTPATIENT)
Dept: SCHEDULING | Facility: HOME HEALTH | Age: 62
End: 2022-03-28
Payer: COMMERCIAL

## 2022-03-28 PROCEDURE — G0300 HHS/HOSPICE OF LPN EA 15 MIN: HCPCS

## 2022-03-30 ENCOUNTER — HOME CARE VISIT (OUTPATIENT)
Dept: SCHEDULING | Facility: HOME HEALTH | Age: 62
End: 2022-03-30
Payer: COMMERCIAL

## 2022-03-30 VITALS
DIASTOLIC BLOOD PRESSURE: 80 MMHG | OXYGEN SATURATION: 97 % | HEART RATE: 78 BPM | TEMPERATURE: 97.6 F | SYSTOLIC BLOOD PRESSURE: 130 MMHG

## 2022-03-30 VITALS
DIASTOLIC BLOOD PRESSURE: 74 MMHG | HEART RATE: 70 BPM | RESPIRATION RATE: 18 BRPM | SYSTOLIC BLOOD PRESSURE: 122 MMHG | OXYGEN SATURATION: 100 %

## 2022-03-30 PROCEDURE — G0299 HHS/HOSPICE OF RN EA 15 MIN: HCPCS

## 2022-03-30 PROCEDURE — G0157 HHC PT ASSISTANT EA 15: HCPCS

## 2022-03-30 NOTE — HOME HEALTH
S: Pt denies falls or changes in medication, c/o 5/10 pain in right foot   CAREGIVER PARTICIPATION: Pt is Ind with ADLs  O: TRANSFERS: CGA provided for sit to stand transfers w/FWW    THEREX/HEP: Pt instructed in standing hip extension, HS curls hip abduction, seated knee ext, marching x 10 x 1 set to RLE  GAIT: Gait tng outside on uneven surfaces w/ FWW pt lubna x 80ft VCs to decrease stride length on left and slow walter to improve safety, VCs to maintain wt bearing restrictions  STAIRS:  Pt negotiates front steps to exit/enter home w/ rails with CGA provided for safety  EDUCATION: Pt instructed to perform HEP 3x day, instructed in pain management   RESPONSE TO EDUCATION: Pt able to return demonstrate HEP, able to teach back pain management    RESPONSE TO TX: Pt is anxious to resume regular activities pt was outside when arrived tolerated tx without increae in pain reported, fatigues quickly instructed to gradually increaes reps of HEP. A: Patient requires skilled PT for instruction in strengthening, balance and coordination to improve strength and facilitate increased independence with functional mobility.    P: Cont with PT, 2w4, 1w1 for strengthening, gait tng, endurance tng and to balance anticipated DC 4/19/2022

## 2022-03-30 NOTE — HOME HEALTH
Skilled reason for visit: wound care    Caregiver involvement: independent. Medications reviewed and all medications are available in the home this visit. The following education was provided regarding medications:  patient/cg to continue to take medications as prescribed. patient aware to monitor for effectiveness and to notify staff of any adverse reactions to medications/any changes to medication regimen. .    MD notified of any discrepancies/look a-like medications/medication interactions: n/a  Medications are effective at this time. Home health supplies by type and quantity ordered/delivered this visit include: patient has adequate supplies    Patient education provided this visit:   patient made aware to monitor for s/s of infection [increased swelling, increased redness around site, increased pain, foul smelling drainage, fever] aware who to report to/when.  patient/cg instructed to monitor for edema/increase in edema, to elevate extremity when edema occurs and to notify md if edema exceeds normal limits for patient  pt aware to keep incision clean and dry as ordered, to report any new drainage to staff. dressing changed as ordered, healing well with no s/s of infection present. Sharps education provided: n/a    Patient level of understanding of education provided: patient WAS ABLE TO REPEAT BACK AND VOICED UNDERSTANDING OF ALL EDUCATION PROVIDED.           Skilled Care Performed this visit: Wound Care    Patient response to procedure performed:  patient stated that it felt better    Agency Progress toward goals: progressing and most likley will be discharged early    Patient's Progress towards personal goals: to be discharged early    Home exercise program: PATIENT TO TAKE ALL MEDS AS ORDERED, DO ICS X10/HR WHILE AWAKE, DRINK PLENTY OF FLUIDS,      Continued need for the following skills: Nursing and Physical Therapy    Plan for next visit: wound care    Patient and/or caregiver notified and agrees to changes in the Plan of Care N/A      The following discharge planning was discussed with the pt/caregiver: Patient will be discharged once education has completed, patient is medically stable and pt/cg are able to independently manage wound care/ wound has healed or no longer requires skilled care.

## 2022-03-31 PROCEDURE — MED11538 DRESSING,HYDROFIBER,ADVANTAGE,4"X5"

## 2022-03-31 PROCEDURE — A6446 CONFORM BAND S W>=3" <5"/YD: HCPCS

## 2022-03-31 PROCEDURE — A6260 WOUND CLEANSER ANY TYPE/SIZE: HCPCS

## 2022-03-31 PROCEDURE — A6216 NON-STERILE GAUZE<=16 SQ IN: HCPCS

## 2022-03-31 NOTE — HOME HEALTH
SN reason for visit: wound care     Caregiver involvement: Patient's cg is brother and is available if needed for assistance with iadls, adls, meal prep, medication management, taking to md appointments. .    Medications reconciled and all medications are available in the home this visit. The following education was provided regarding medications, medication interactions, and look a like medications: educated on the use of anticoagulants. Medications  are effective at this time. Home health supplies by type and quantity ordered/delivered this visit include: gauze rolls    Patient education/skilled care provided this visit:wound care performed per orders- old dressing completely removed, wound cleansed with dwc and applied aquacel, secured with kerlix. dressing changed as ordered, healing well with no s/s of infection present. pt aware to keep dressing clean, dry and intact as ordered. pt aware to keep incision clean and dry as ordered, to report any new drainage to staff. patient made aware to monitor for s/s of infection [increased swelling, increased redness around site, increased pain, foul smelling drainage, fever] aware who to report to/when.  patient encouraged to monitor for increase in pain and to continue with current pain management and to notify staff/md if pain becomes excrutiating/intolerable. pt instructed to follow a high protein diet for healing- to try to get 90g protein daily. patient instructed to maintain clear pathways in home and to minimize clutter to prevent falls from occurring/minimize fall potential.  patient/cg to continue to take medications as prescribed. patient aware to monitor for effectiveness and to notify staff of any adverse reactions to medications/any changes to medication regimen.   reviewed side effects, purposes, dosage, frequencies  patient to follow Eliquis regimen as directed by MD and to monitor for s/s of excessive bleeding, aware who to report to/when. PATIENT TAKING ELIQUIS, EDUCATED ON THE BLEEDING PRECAUTIONS, BRUSING, BLEEDING GUMS, BLACK TARY STOOLS, BLOODY STOOLS. Patient is a fall risk. Educated pateint to sit on the side of the chair/bed, take a slow deep breaths, have feet firmly planted before standing up, use cane/walker if available, or have someone to assist.  INSTRUCTED PATIENT AND CG THAT SHOULD ANY NEEDS OR CONCERNS ARISE TO FIRST CALL OUR OFFICE, OR THE DR'S OFFICE  OR GO TO AN URGENT CARE CENTER AND NOT TO THE ED FOR NON-LIFE THREATENING EVENTS. IF IT IS LIFE THREATENING THEN CALL 911 OR GO TO THE CLOSEST ER. Patient level of understanding of education provided: Pt verbalized understanding of Eliquis education provided today with no questions/concerns at this time. Patient response to procedure performed:  Pt tolerated wound care well with no discomfort/pain observed or voiced at this time. Progress toward goals: Patient's personal goal is to have complete wound healing and remain free of infection. Home exercise program: activity as tolerated, trying to get physical activity 4-5 x weekly, 30 minutes daily. stopping activity if causing shortness of breath or chest pain, dizziness or weakness. Continued need for the following skills: Nursing    The following discharge planning was discussed with the pt/caregiver: Pt.clinically discharged and documentation finalized for completion of agency discharge.

## 2022-04-01 ENCOUNTER — HOME CARE VISIT (OUTPATIENT)
Dept: SCHEDULING | Facility: HOME HEALTH | Age: 62
End: 2022-04-01
Payer: COMMERCIAL

## 2022-04-01 PROCEDURE — G0300 HHS/HOSPICE OF LPN EA 15 MIN: HCPCS

## 2022-04-01 PROCEDURE — G0157 HHC PT ASSISTANT EA 15: HCPCS

## 2022-04-02 VITALS
OXYGEN SATURATION: 95 % | TEMPERATURE: 98.1 F | HEART RATE: 83 BPM | SYSTOLIC BLOOD PRESSURE: 132 MMHG | DIASTOLIC BLOOD PRESSURE: 75 MMHG

## 2022-04-04 ENCOUNTER — HOME CARE VISIT (OUTPATIENT)
Dept: SCHEDULING | Facility: HOME HEALTH | Age: 62
End: 2022-04-04
Payer: COMMERCIAL

## 2022-04-04 VITALS
DIASTOLIC BLOOD PRESSURE: 78 MMHG | OXYGEN SATURATION: 94 % | HEART RATE: 72 BPM | TEMPERATURE: 98.5 F | SYSTOLIC BLOOD PRESSURE: 108 MMHG

## 2022-04-04 PROCEDURE — G0300 HHS/HOSPICE OF LPN EA 15 MIN: HCPCS

## 2022-04-05 ENCOUNTER — HOME CARE VISIT (OUTPATIENT)
Dept: SCHEDULING | Facility: HOME HEALTH | Age: 62
End: 2022-04-05
Payer: COMMERCIAL

## 2022-04-05 VITALS
TEMPERATURE: 97.9 F | SYSTOLIC BLOOD PRESSURE: 118 MMHG | HEART RATE: 100 BPM | DIASTOLIC BLOOD PRESSURE: 83 MMHG | OXYGEN SATURATION: 98 %

## 2022-04-05 PROCEDURE — G0157 HHC PT ASSISTANT EA 15: HCPCS

## 2022-04-05 NOTE — HOME HEALTH
SN reason for visit: wound care     Caregiver involvement: Patient's cg is brother and he is available if needed for assistance with iadls, adls, meal prep, medication management, taking to md appointments. .    Medications reconciled and all medications are available in the home this visit. The following education was provided regarding medications, medication interactions, and look a like medications: educated on general med use. Medications  are effective at this time. Home health supplies by type and quantity ordered/delivered this visit include: n/a    Patient education/skilled care provided this visit:wound care performed per orders- old dressing completely removed, wound cleansed with dwc and applied aquacel, secured with kerlix. dressing changed as ordered, healing well with no s/s of infection present. pt aware to keep dressing clean, dry and intact as ordered. pt aware to keep incision clean and dry as ordered, to report any new drainage to staff. patient made aware to monitor for s/s of infection [increased swelling, increased redness around site, increased pain, foul smelling drainage, fever] aware who to report to/when.  patient encouraged to monitor for increase in pain and to continue with current pain management and to notify staff/md if pain becomes excrutiating/intolerable. pt instructed to follow a high protein diet for healing- to try to get 90g protein daily. patient instructed to maintain clear pathways in home and to minimize clutter to prevent falls from occurring/minimize fall potential.  patient/cg to continue to take medications as prescribed. patient aware to monitor for effectiveness and to notify staff of any adverse reactions to medications/any changes to medication regimen. reviewed side effects, purposes, dosage, frequencies  PATIENT TAKING ELIQUIS, EDUCATED ON THE BLEEDING PRECAUTIONS, BRUSING, BLEEDING GUMS, BLACK TARY STOOLS, BLOODY STOOLS. Patient is a fall risk. Educated pateint to sit on the side of the chair/bed, take a slow deep breaths, have feet firmly planted before standing up, use cane/walker if available, or have someone to assist.  INSTRUCTED PATIENT AND CG THAT SHOULD ANY NEEDS OR CONCERNS ARISE TO FIRST CALL OUR OFFICE, OR THE DR'S OFFICE  OR GO TO AN URGENT CARE CENTER AND NOT TO THE ED FOR NON-LIFE THREATENING EVENTS. IF IT IS LIFE THREATENING THEN CALL 911 OR GO TO THE CLOSEST ER. Patient level of understanding of education provided: Pt verbalized understanding of general med education provided today with no questions/concerns at this time. Patient response to procedure performed:  Pt tolerated wound care well with no discomfort/pain observed or voiced at this time. Progress toward goals: Patient's personal goal is to have complete wound healing and remain free of infection. Home exercise program: activity as tolerated, trying to get physical activity 4-5 x weekly, 30 minutes daily. stopping activity if causing shortness of breath or chest pain, dizziness or weakness. Continued need for the following skills: Nursing    The following discharge planning was discussed with the pt/caregiver: Patient will be discharged once education has completed, patient is medically stable and pt/cg are able to independently manage wound care/ wound has healed or no longer requires skilled care.

## 2022-04-05 NOTE — HOME HEALTH
S: Pt denies falls or changes in medication, c/o pain in LLE and right foot 4/10  CAREGIVER PARTICIPATION: Pt is Ind with ADLs  O: TRANSFERS: Mod I sit to stand transfers   THEREX/HEP: Pt instructed in seated knee ext, knee to chest to RLE, standing hip abduction, hip flexion, hip extension increased to 15 reps, side stepping x 10ft x 2   GAIT: Gait tng outside on uneven surfaces pt tolerated x 100ft/Sup with lofstrand crutches no loss of balance pt able to pull garbage to house without loss of balance walter to improve safety  BALANCE: side stepping x 10ft x 2, static standing unsupported feet apart pt able to maintain bal x 20 sec, unable to maintain balance with eyes closed  EDUCATION: Pt instructed to perform HEP 3x day, reviewed fall prevention    RESPONSE TO EDUCATION: Pt able to return demonstrate HEP, able to teach back fall prevention strategies  RESPONSE TO TX: Pt fatigues quickly rest peiods needed, tolerated increased reps of therex and increased ambulation distance no increase in pain reported today Skin tear on great toe following ambulation cleansed with wound  and dressed with kerlix gauze   A: Patient requires skilled PT for instruction in strengthening, balance and coordination to improve strength and facilitate increased independence with functional mobility.    P: Cont with PT, 2w4, 1w1 for strengthening, gait tng, endurance tng and to balance anticipated DC 4/19/2022

## 2022-04-05 NOTE — HOME HEALTH
SN reason for visit: wound care     Caregiver involvement: Patient's cg is brother and he is available if needed for assistance with iadls, adls, meal prep, medication management, taking to md appointments. Medications reconciled and all medications are available in the home this visit. The following education was provided regarding medications, medication interactions, and look a like medications: reinforced education on the use of Eliquis. Medications  are effective at this time. Home health supplies by type and quantity ordered/delivered this visit include: n/a    Patient education/skilled care provided this visit:wound care performed per orders- old dressing completely removed, wound cleansed with dwc and applied aquacel, secured with kerlix and tubigrip.  dressing changed as ordered, healing well with no s/s of infection present. pt aware to keep dressing clean, dry and intact as ordered. pt aware to keep incision clean and dry as ordered, to report any new drainage to staff. patient made aware to monitor for s/s of infection [increased swelling, increased redness around site, increased pain, foul smelling drainage, fever] aware who to report to/when.  patient encouraged to monitor for increase in pain and to continue with current pain management and to notify staff/md if pain becomes excrutiating/intolerable. pt instructed to follow a high protein diet for healing- to try to get 90g protein daily. patient instructed to maintain clear pathways in home and to minimize clutter to prevent falls from occurring/minimize fall potential.  patient/cg to continue to take medications as prescribed. patient aware to monitor for effectiveness and to notify staff of any adverse reactions to medications/any changes to medication regimen.   reviewed side effects, purposes, dosage, frequencies  PATIENT TAKING ELIQUIS, EDUCATED ON THE BLEEDING PRECAUTIONS, BRUSING, BLEEDING GUMS, BLACK TARY STOOLS, BLOODY STOOLS. Patient is a fall risk. Educated pateint to sit on the side of the chair/bed, take a slow deep breaths, have feet firmly planted before standing up, use cane/walker if available, or have someone to assist.  INSTRUCTED PATIENT AND CG THAT SHOULD ANY NEEDS OR CONCERNS ARISE TO FIRST CALL OUR OFFICE, OR THE DR'S OFFICE  OR GO TO AN URGENT CARE CENTER AND NOT TO THE ED FOR NON-LIFE THREATENING EVENTS. IF IT IS LIFE THREATENING THEN CALL 911 OR GO TO THE CLOSEST ER. Patient level of understanding of education provided: Pt verbalized understanding of Eliquis education provided today with no questions/concerns at this time. Patient response to procedure performed:  Pt tolerated wound care well with no discomfort/pain observed or voiced at this time. Progress toward goals: Patient's personal goal is to have complete wound healing and remain free of infection. Home exercise program: activity as tolerated, trying to get physical activity 4-5 x weekly, 30 minutes daily. stopping activity if causing shortness of breath or chest pain, dizziness or weakness. Continued need for the following skills: Nursing    The following discharge planning was discussed with the pt/caregiver: Patient will be discharged once education has completed, patient is medically stable and pt/cg are able to independently manage wound care/ wound has healed or no longer requires skilled care.

## 2022-04-06 ENCOUNTER — HOME CARE VISIT (OUTPATIENT)
Dept: SCHEDULING | Facility: HOME HEALTH | Age: 62
End: 2022-04-06
Payer: COMMERCIAL

## 2022-04-06 VITALS — RESPIRATION RATE: 18 BRPM

## 2022-04-06 PROCEDURE — G0299 HHS/HOSPICE OF RN EA 15 MIN: HCPCS

## 2022-04-06 NOTE — HOME HEALTH
Skilled reason for visit: wound care     PATIENT HAS AN MD APPT THURSDAY TO HAVE SUTURES/STAPLES REMOVED, POSSIBLE DC N FRIDAY AS PATIENT STATES THAT HE DOESN'T WANT ANY MORE SN OR PT. Caregiver involvement: independent. Medications reviewed and all medications are available in the home this visit. The following education was provided regarding medications:  patient/cg to continue to take medications as prescribed. patient aware to monitor for effectiveness and to notify staff of any adverse reactions to medications/any changes to medication regimen. .      MD notified of any discrepancies/look a-like medications/medication interactions: n/a    Medications are effective at this time. Home health supplies by type and quantity ordered/delivered this visit include: patient has adequate supplies         Patient education provided this visit:     patient made aware to monitor for s/s of infection [increased swelling, increased redness around site, increased pain, foul smelling drainage, fever] aware who to report to/when.    patient/cg instructed to monitor for edema/increase in edema, to elevate extremity when edema occurs and to notify md if edema exceeds normal limits for patient    pt aware to keep incision clean and dry as ordered, to report any new drainage to staff. dressing changed as ordered, healing well with no s/s of infection present. PATIENT TAKING Arvell Klinefelter, EDUCATED ON THE BLEEDING PRECAUTIONS, BRUSING, BLEEDING GUMS, BLACK TARY STOOLS, BLOODY STOOLS. Sharps education provided: n/a         Patient level of understanding of education provided: patient WAS ABLE TO REPEAT BACK AND VOICED UNDERSTANDING OF ALL EDUCATION PROVIDED.                         Skilled Care Performed this visit: Wound Care         Patient response to procedure performed:  patient stated that it felt better         Agency Progress toward goals: progressing and most likley will be discharged early         Patient's Progress towards personal goals: to be discharged early         Home exercise program: PATIENT TO TAKE ALL MEDS AS ORDERED, DO ICS X10/HR WHILE AWAKE, DRINK PLENTY OF FLUIDS,              Continued need for the following skills: Nursing and Physical Therapy         Plan for next visit: wound care         Patient and/or caregiver notified and agrees to changes in the Plan of Care N/A           The following discharge planning was discussed with the pt/caregiver: Patient will be discharged once education has completed, patient is medically stable and pt/cg are able to independently manage wound care/ wound has healed or no longer requires skilled care.

## 2022-04-07 ENCOUNTER — HOME CARE VISIT (OUTPATIENT)
Dept: SCHEDULING | Facility: HOME HEALTH | Age: 62
End: 2022-04-07
Payer: COMMERCIAL

## 2022-04-07 VITALS
DIASTOLIC BLOOD PRESSURE: 89 MMHG | OXYGEN SATURATION: 99 % | TEMPERATURE: 98 F | HEART RATE: 99 BPM | SYSTOLIC BLOOD PRESSURE: 143 MMHG

## 2022-04-07 PROCEDURE — G0157 HHC PT ASSISTANT EA 15: HCPCS

## 2022-04-07 NOTE — HOME HEALTH
S: Pt denies falls or changes in medication, c/o 4/10 right foot pain   CAREGIVER PARTICIPATION: Pt is Ind with ADLs  O: TRANSFERS: Mod I sit to stand transfers   THEREX/HEP: Pt instructed in seated knee ext, knee to chest to RLE, standing hip abduction, hip flexion, hip extension increased to 15 reps, side stepping x 10ft x 2 instructed to increase to 20 reps as tolerated  GAIT: Gait on even and uneven surfaces with crutches x 129ft total with Sup   STAIRS: Pt negotiates steps to enter/exit home with use of single rail and crutch Sup-CGA provided   BALANCE: TINETTI 18/29  EDUCATION: Pt instructed to perform HEP 3x day, reviewed fall prevention    A: RESPONSE TO EDUCATION: Pt able to return demonstrate HEP, able to teach back fall prevention strategies  RESPONSE TO TX: Pt able to tolerate stair negotiation safely, manovers around home and outside on uneven surfaces safely with use of crutches, improved Tinetti by 3 points, pt fatigues quickly reports RPE of 6 following tx   Patient requires skilled PT for instruction in strengthening, balance and coordination to improve strength and facilitate increased independence with functional mobility.    P: Cont with PT, 2w4, 1w1 for strengthening, gait tng, endurance tng and to balance anticipated DC 4/19/2022

## 2022-04-08 ENCOUNTER — HOME CARE VISIT (OUTPATIENT)
Dept: SCHEDULING | Facility: HOME HEALTH | Age: 62
End: 2022-04-08
Payer: COMMERCIAL

## 2022-04-11 ENCOUNTER — HOME CARE VISIT (OUTPATIENT)
Dept: SCHEDULING | Facility: HOME HEALTH | Age: 62
End: 2022-04-11
Payer: COMMERCIAL

## 2022-04-12 ENCOUNTER — HOME CARE VISIT (OUTPATIENT)
Dept: SCHEDULING | Facility: HOME HEALTH | Age: 62
End: 2022-04-12
Payer: COMMERCIAL

## 2022-04-12 VITALS
HEART RATE: 97 BPM | TEMPERATURE: 97.6 F | SYSTOLIC BLOOD PRESSURE: 122 MMHG | DIASTOLIC BLOOD PRESSURE: 82 MMHG | OXYGEN SATURATION: 97 %

## 2022-04-12 PROCEDURE — G0157 HHC PT ASSISTANT EA 15: HCPCS

## 2022-04-12 NOTE — HOME HEALTH
S: Pt denies falls or changes in medication, c/o 4/10 right foot pain   CAREGIVER PARTICIPATION: Pt is Ind with ADLs  O: TRANSFERS: Mod I sit to stand transfers RW, SPC  THEREX/HEP: Pt instructed in seated knee ext, knee to chest to RLE, standing hip abduction, hip flexion, hip extension bilaterally x 15 reps, side stepping x 10ft x 2 pt able to return demonstrate HEP  GAIT: Gait on uneven surfaces x 100ft with cane pt tripped and loss balance causing prostetic knee to buckle, causing patient to fall onto right knee. Pt was able to recover from the fall with VCs no physical assist needed. EDUCATION: Pt instructed to perform HEP 3x day, reviewed fall prevention    A: RESPONSE TO EDUCATION: Pt able to return demonstrate HEP, able to teach back fall prevention strategies  RESPONSE TO TX: Pt able to recover from fall unassisted without injury. Ind with HEP   Patient requires skilled PT for instruction in strengthening, balance and coordination to improve strength and facilitate increased independence with functional mobility.    P: Cont with PT, 2w4, 1w1 for strengthening, gait tng, endurance tng and to balance anticipated DC 4/19/2022 [facility] signed                               Devora Odell

## 2022-04-13 NOTE — HOME HEALTH
The visit was missed due to the following reason(s): client cancelled; requests no more visits -[facility] DC being done by Brigitte Herrmann RN.

## 2022-04-13 NOTE — CASE COMMUNICATION
The visit was missed due to the following reason(s): client cancelled; requests no more visits -[facility] DC being done by Baldemar Mustafa RN.

## 2022-04-14 ENCOUNTER — HOME CARE VISIT (OUTPATIENT)
Dept: SCHEDULING | Facility: HOME HEALTH | Age: 62
End: 2022-04-14
Payer: COMMERCIAL

## 2022-04-14 VITALS
TEMPERATURE: 98 F | DIASTOLIC BLOOD PRESSURE: 72 MMHG | SYSTOLIC BLOOD PRESSURE: 142 MMHG | OXYGEN SATURATION: 92 % | HEART RATE: 90 BPM

## 2022-04-14 PROCEDURE — G0157 HHC PT ASSISTANT EA 15: HCPCS

## 2022-04-14 NOTE — HOME HEALTH
S: Pt denies falls or changes in medication, c/o 5/10 right foot pain   CAREGIVER PARTICIPATION: Pt is Ind with ADLs  O: TRANSFERS: Mod I sit to stand transfers single crutch  THEREX/HEP: GAIT: Gait on uneven surfaces x 120ft with single crutch dist sup   TINETTI: 20/28  EDUCATION: Pt instructed to perform HEP 3x day, reviewed fall prevention   A: RESPONSE TO EDUCATION: Pt is Ind with HEP, able to teach back fall prevention strategies  RESPONSE TO TX: Pt ha progressed well no amb with single curtch, Ind with HEP, Improved Tinetti by 5 pints since [facility] pt prepared for DC next visit  P: DC next visit

## 2022-04-19 ENCOUNTER — HOME CARE VISIT (OUTPATIENT)
Dept: SCHEDULING | Facility: HOME HEALTH | Age: 62
End: 2022-04-19
Payer: COMMERCIAL

## 2022-04-19 VITALS
SYSTOLIC BLOOD PRESSURE: 110 MMHG | DIASTOLIC BLOOD PRESSURE: 70 MMHG | TEMPERATURE: 97.8 F | RESPIRATION RATE: 16 BRPM | HEART RATE: 99 BPM | OXYGEN SATURATION: 98 %

## 2022-04-19 PROCEDURE — G0151 HHCP-SERV OF PT,EA 15 MIN: HCPCS

## 2022-04-19 PROCEDURE — 400013 HH SOC

## 2022-04-19 NOTE — Clinical Note
Mr. Jim Bhandari has been clinically discharged and documentation finalized for completion of PT discharge. Caregiver involvement: Pt sister is primary caregiver at this time and has been assisting with medical appointments and some ADL support  Medications reconciled and all medications are available in the home this visit. The following education was provided regarding medications, medication interactions, and look a like medications: NA  Medications  are effective at this time. Home health supplies by type and quantity ordered/delivered this visit include: NA  Patient education provided this visit: safety with functional mobility  Current Functional Status and progress towards goals:  Strength: Pt. RLE strength is 4/5 which is an improvement from the initial evaluation strength of 3-/5. This allows the patient increased functional independence and mobility    TRANSFERS: Pt. is mod I with 1 crutch with all transfers which demonstrates and improvement from the initial evaluation score of min A with FWW. This allows the patient increased functional independence and mobility  GAIT/WC MOBILITY: Pt. is able to ambulate >150 feet outside over even and uneven surfaces with mod I A with 1 crutch AD. This represents an improvement from the initial evaluation of 50 feet with bilateral crutches AD on level surfaces with min/mod A. STAIRS: 4 steps independently  BALANCE: Patient's final tinetti is 18/28 which is an improvement from the initial evaluation score of 15/28. This allows the patient to be functionally more independent and have a decreased fall risk  Progress toward goals: Patient has met all goals, see interventions for details. Pt. was able to return demonstrate all mobility training and HEP shown independently. Patient response to treatment and education: Patient tolerated treatment well, with no complaint of new pain noted post treatment.  Home exercise program: Patient has been given a HEP and patient/caregiver understand the HEP. Patient is doing the HEP 3/day as able  Continued need for the following skills:  NA patient is final DC from PT today   The following discharge planning was discussed with the pt/caregiver: DC from agency    Thank you for your referral of this patient.     Sincerely,    Livier Tafoya, MPT  Physical Therapist

## 2022-04-19 NOTE — HOME HEALTH
DC ACTIONS NARRATIVE  Pt. clinically discharged and documentation finalized for completion of PT discharge. Caregiver involvement: Pt sister is primary caregiver at this time and has been assisting with medical appointments and some ADL support  Medications reconciled and all medications are available in the home this visit. The following education was provided regarding medications, medication interactions, and look a like medications: NA  Medications  are effective at this time. Home health supplies by type and quantity ordered/delivered this visit include: NA  Patient education provided this visit: safety with functional mobility  Current Functional Status and progress towards goals:  Strength: Pt. RLE strength is 4/5 which is an improvement from the initial evaluation strength of 3-/5. This allows the patient increased functional independence and mobility    TRANSFERS: Pt. is mod I with 1 crutch with all transfers which demonstrates and improvement from the initial evaluation score of min A with FWW. This allows the patient increased functional independence and mobility  GAIT/WC MOBILITY: Pt. is able to ambulate >150 feet outside over even and uneven surfaces with mod I A with 1 crutch AD. This represents an improvement from the initial evaluation of 50 feet with bilateral crutches AD on level surfaces with min/mod A. STAIRS: 4 steps independently  BALANCE: Patient's final tinetti is 18/28 which is an improvement from the initial evaluation score of 15/28. This allows the patient to be functionally more independent and have a decreased fall risk  Progress toward goals: Patient has met all goals, see interventions for details. Pt. was able to return demonstrate all mobility training and HEP shown independently. Patient response to treatment and education: Patient tolerated treatment well, with no complaint of new pain noted post treatment.  Home exercise program: Patient has been given a HEP and patient/caregiver understand the HEP. Patient is doing the HEP 3/day as able  Continued need for the following skills:  NA patient is final DC from PT today   The following discharge planning was discussed with the pt/caregiver: DC from agency      POST FALL ASSESSMENT by FADIA Diehl     SITUATION: Pt. tripped and fell outside  Date/time: 4/12/22   Location: outside   Equipment used: [facility]  MD notification: Yes  Recovery assistance: none. Pt was able to get up by himself   Injury: none  Recommendations for MD follow-up ED or medical appt: NA   Medication Affects:  NA  BACKGROUND:   History of falls: no history of falls   Dx: right toe amputation   Equipment: SPC   ASSESSMENT:   Pt was ambuating outside and tripped and went down to his knees. 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    RECOMMENDATION: Pt. needs to use loftstrand crutch vs. SPC for all mobility  Referrals: None   Equipment referrals: NA

## 2022-04-26 ENCOUNTER — OFFICE VISIT (OUTPATIENT)
Dept: CARDIOLOGY CLINIC | Age: 62
End: 2022-04-26
Payer: COMMERCIAL

## 2022-04-26 VITALS
BODY MASS INDEX: 26.77 KG/M2 | WEIGHT: 187 LBS | HEIGHT: 70 IN | DIASTOLIC BLOOD PRESSURE: 86 MMHG | SYSTOLIC BLOOD PRESSURE: 130 MMHG | OXYGEN SATURATION: 99 % | HEART RATE: 112 BPM

## 2022-04-26 DIAGNOSIS — I25.5 ISCHEMIC CARDIOMYOPATHY: ICD-10-CM

## 2022-04-26 DIAGNOSIS — I50.22 CHRONIC SYSTOLIC HEART FAILURE (HCC): ICD-10-CM

## 2022-04-26 DIAGNOSIS — I70.213 ATHEROSCLEROSIS OF NATIVE ARTERY OF BOTH LOWER EXTREMITIES WITH INTERMITTENT CLAUDICATION (HCC): Primary | ICD-10-CM

## 2022-04-26 PROCEDURE — 99214 OFFICE O/P EST MOD 30 MIN: CPT | Performed by: INTERNAL MEDICINE

## 2022-04-26 PROCEDURE — 93000 ELECTROCARDIOGRAM COMPLETE: CPT | Performed by: INTERNAL MEDICINE

## 2022-04-26 NOTE — PROGRESS NOTES
HISTORY OF PRESENT ILLNESS  Kelsey Morris is a 57 y.o. male. ASSESSMENT and PLAN    Mr. Justine Kilpatrick, previous Dr. Pepe Gonsalez patient, has known history of PVD, as well as CAD.  His coronary angiography back in 2017 showed EF 30% with mild left main and LAD disease.  Circumflex had proximal 70% lesion as well as trifurcation healed ruptured plaque with residual 90% stenosis. Janak Stewart had diffuse 50% with focal mid 90% disease.  Subsequently, he had his circumflex and RCA stented to 0%.  His nuclear scan back in November 2020 showed low risk finding with EF 51%.  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  He has PAF, as well as dyslipidemia. He was hospitalized March 2021 at [facility] for right leg arterial thrombectomy.  He was noted to be in atrial fibrillation. In February 2022, he was admitted with right femoral artery/popliteal artery bypass graft occlusion. During that hospitalization, echocardiogram revealed reduced EF. · CAD:    Symptomatically stable. · CAF:    Rate controlled and on Eliquis. His heart rate was mildly tachycardic initially when he presented intubated exam room. However, with rest, it subsided. · BP:    Well controlled at 138/86. · CHF:    There is no evidence of decompensated CHF noted. · Weight:     His weight today is 187 pounds. His previous weight was 193 pounds. · Cholesterol:   Target LDL <70. Lipitor 80. · Tobacco:    He denies active tobacco use. · Anti-platelet:   Remains on Eliquis.     I will see him back in 6 months.  Thank you. Encounter Diagnoses   Name Primary?     Atherosclerosis of native artery of both lower extremities with intermittent claudication (HCC) Yes    Ischemic cardiomyopathy     Chronic systolic heart failure (HCC)      current treatment plan is effective, no change in therapy  lab results and schedule of future lab studies reviewed with patient  reviewed diet, exercise and weight control  very strongly urged to quit smoking to reduce cardiovascular risk  cardiovascular risk and specific lipid/LDL goals reviewed  use of aspirin to prevent MI and TIA's discussed      HPI   Today, Mr. Poppy Patel has no complaints of chest pains, increased shortness of breath or decreased exercise capacity. He denies any orthopnea or PND. He denies any palpitation sensation or dizziness. Since his hospital discharge in March 2022, he feels nearly back to normal.  He has been active. Review of Systems   Respiratory: Negative for shortness of breath. Cardiovascular: Negative for chest pain, palpitations, orthopnea, claudication, leg swelling and PND. All other systems reviewed and are negative. Physical Exam  Vitals and nursing note reviewed. HENT:      Head: Normocephalic. Eyes:      Conjunctiva/sclera: Conjunctivae normal.   Neck:      Vascular: No carotid bruit. Cardiovascular:      Rate and Rhythm: Tachycardia present. Rhythm irregular. Pulmonary:      Breath sounds: Normal breath sounds. Abdominal:      Palpations: Abdomen is soft. Musculoskeletal:         General: No swelling. Cervical back: No rigidity. Skin:     General: Skin is warm and dry. Neurological:      General: No focal deficit present. Mental Status: He is alert and oriented to person, place, and time.    Psychiatric:         Mood and Affect: Mood normal.         Behavior: Behavior normal.         PCP: Myra Lazcano MD    Past Medical History:   Diagnosis Date    Acute blood loss as cause of postoperative anemia 4/4/2017    Anticoagulated on Coumadin     Aortoiliac occlusive disease ([address] 1/25/2017    Atherosclerosis of native artery of both lower extremities with intermittent claudication ([address] 1/25/2017    Benign hypertensive heart disease with systolic congestive heart failure, NYHA class 2 ([address] 1/26/2015    Carotid artery disease (HCC)     Chronic anemia     Chronic systolic heart failure (HCC)     Chronic ulcer of right foot ([address] 4/4/2017    Chronic ulcer of right heel ([address] 8/8/2017    Coronary artery disease involving native coronary artery of native heart 3/15/2017    Successful stenting of Cx (Xience LESLEY) and RCA (Xience LESLEY) to 0% by Dr. Marilee Weinberg on 3/15/17.     DDD (degenerative disc disease), lumbar 1/26/2015    Dyslipidemia     Erectile dysfunction 7/5/2016    Euthyroid sick syndrome 4/6/2017    Hereditary peripheral neuropathy 11/15/2016    History of cardioversion 04/18/2017    S/P Synchronized external cardioversion (4/18/2017 - Dr. Kei Rush)    History of vitamin D deficiency 4/22/2017    Vitamin D 25-Hydroxy (4/22/2017) = 12.0; Vitamin D 25-Hydroxy (5/26/2017) = 38.7    Infection of vascular bypass graft ([address] 7/24/2017    Left lower extremity    Ischemic cardiomyopathy     Moderate to severe pulmonary hypertension ([address] 07/23/2017    Paroxysmal atrial fibrillation (HCC)     Peripheral artery disease ([address] 1/25/2017    Skin ulcer of malleolar area of right ankle ([address]     Spinal stenosis of lumbar region with radiculopathy 5/4/2015    Dr. Eran Solis       Past Surgical History:   Procedure Laterality Date    CARDIAC CATHETERIZATION  3/8/2017         CARDIAC CATHETERIZATION  3/15/2017         CORONARY STENT SINGLE W/PTCA  3/15/2017         HX ABOVE KNEE AMPUTATION Left 08/18/2017    S/P Left above-the-knee amputation (8/18/2017 - Dr. Ambrocio Standing)    HX ATHERECTOMY Left 06/15/2017    S/P Atherectomy and balloon angioplasty of the left superficial femoral artery and above-knee popliteal artery (6/15/2017 - Dr. Ambrocio Standing)    HX ATHERECTOMY Right 09/07/2017    S/P Atherectomy and balloon angioplasty of the below-the-knee popliteal artery, above-the-knee popliteal artery, tibioperoneal trunk artery and posterior tibial artery (9/7/2017 - Dr. Ambrocio Standing)    HX COLONOSCOPY  07/23/2015    polyps repeat 2020 5 years Giovanna Henderson Arn Right 04/04/2017 S/P Right axillary to bifemoral artery bypass using an 8 mm Propaten graft from the right axilla to the right common femoral artery with a jump femoral-femoral bypass with another 8 mm Propaten external ring bypass; Right common femoral artery, and profunda femoris artery and superficial femoral artery endarterectomy causing an extensive surgery on the right side (4/4/2017 - Dr. Tiffany Pettit)    [address] Right 04/07/2017    S/P Cutdown of femoral-femoral bypass, more on the left groin side; Right lower extremity angiography with first-order catheterization (4/7/2017 - Dr. Cassi Griffin)    HX FEMORAL BYPASS Right 04/10/2017    S/P Right femoral to above-knee popliteal bypass with an 8 mm PTFE graft (4/10/2017 - Dr. Claudette Round)    HX OTHER SURGICAL Left 07/25/2017    S/P Removal of infected graft; Repair of left superficial femoral artery; Repair of left common femoral artery (7/25/2017 - Dr. Cassi Griffin)    HX OTHER SURGICAL Right 06/27/2017    S/P Drainage of right side abscess (6/27/2017 - Dr. Cassi Griffin)    HX OTHER SURGICAL Left 07/01/2017    S/P Left groin exploration; Left femoral repair pseudoaneurysm; Left wound washout; Wound VAC placement (7/01/2017 - Dr. Cassi Griffin)    HX OTHER SURGICAL Left 07/04/2017    S/P Left common femoral artery ligation; Jump bypass from right to left fem-fem to a more distal superficial femoral artery using 8 mm external ringed Propaten graft; Left groin wound exploration and washout (7/4/2017 - Dr. Cassi Griffin)    HX OTHER SURGICAL Right 08/18/2017    S/P Right axillary femoral jump bypass interposition; Removal of infected right axillary femoral bypass;  Wound VAC placement of upper thigh 1.2 cm x 5.2 cm x 1.8 cm and groin 4 cm x 0.5 cm x 0.2 cm (8/18/2017 - Dr. Cassi Griffin)       Current Outpatient Medications   Medication Sig Dispense Refill    cilostazoL (PLETAL) 50 mg tablet TAKE 1 TABLET BY MOUTH 2 TIMES PER DAY 1/2 HOUR BEFORE OR 2 HOURS AFTER BREAKFAST AND DINNER      apixaban (ELIQUIS) 5 mg tablet Take 1 Tablet by mouth two (2) times a day. 60 Tablet 5    metoprolol tartrate (LOPRESSOR) 50 mg tablet TAKE 1 TABLET BY MOUTH TWO TIMES A DAY. 180 Tablet 3    dilTIAZem ER (CARDIZEM CD) 120 mg capsule Take 1 Capsule by mouth nightly. 30 Capsule 6    dilTIAZem ER (CARDIZEM CD) 180 mg capsule Take 1 tablet by mouth once every morning. 30 Capsule 6    furosemide (LASIX) 20 mg tablet Take 1 Tablet by mouth daily. 90 Tablet 1    losartan (COZAAR) 100 mg tablet Take 1 Tablet by mouth daily. 90 Tablet 3    atorvastatin (LIPITOR) 80 mg tablet TAKE 1 TABLET ONCE DAILY (Patient taking differently: Take 80 mg by mouth daily. TAKE 1 TABLET ONCE DAILY) 90 Tablet 3    aspirin 81 mg chewable tablet Take 1 Tab by mouth daily. 90 Tab 3    gabapentin (NEURONTIN) 400 mg capsule take 1 capsule by mouth three times a day 270 Cap 0    multivitamin (ONE A DAY) tablet Take 1 Tab by mouth daily. The patient has a family history of    Social History     Tobacco Use    Smoking status: Former Smoker    Smokeless tobacco: Never Used    Tobacco comment: quit in 2011   Substance Use Topics    Alcohol use:  Yes     Alcohol/week: 2.0 standard drinks     Types: 2 Cans of beer per week     Comment: 10 cans of beer a month    Drug use: Yes     Types: Marijuana     Comment: occasionally-last used 4/17       Lab Results   Component Value Date/Time    Cholesterol, total 83 12/15/2021 07:08 AM    HDL Cholesterol 22 (L) 12/15/2021 07:08 AM    LDL, calculated 43.2 12/15/2021 07:08 AM    Triglyceride 89 12/15/2021 07:08 AM    CHOL/HDL Ratio 3.8 12/15/2021 07:08 AM        BP Readings from Last 3 Encounters:   04/26/22 130/86   04/19/22 110/70   04/14/22 (!) 142/72        Pulse Readings from Last 3 Encounters:   04/26/22 (!) 112   04/19/22 99   04/14/22 90       Wt Readings from Last 3 Encounters:   04/26/22 84.8 kg (187 lb)   03/22/22 83 kg (183 lb)   03/13/22 78.3 kg (172 lb 9.6 oz)         EKG: unchanged from previous tracings, nonspecific ST and T waves changes, atrial fibrillation, rate 112.

## 2022-04-26 NOTE — PROGRESS NOTES
Dago Monzon presents today for   Chief Complaint   Patient presents with    Follow-up     6 month follow up- no complaints        Dago Monzon preferred language for health care discussion is english/other. Is someone accompanying this pt? no    Is the patient using any DME equipment during [address]? Yes due to prosthetic leg     Depression Screening:  3 most recent PHQ Screens 4/26/2022   PHQ Not Done -   Little interest or pleasure in doing things Not at all   Feeling down, depressed, irritable, or hopeless Not at all   Total Score PHQ 2 0       Learning Assessment:  Learning Assessment 10/1/2019   PRIMARY LEARNER Patient   HIGHEST LEVEL OF EDUCATION - PRIMARY LEARNER  -   BARRIERS PRIMARY LEARNER -   [address] CAREGIVER -   PRIMARY LANGUAGE ENGLISH    NEED -   LEARNER PREFERENCE PRIMARY LISTENING   ANSWERED BY patient   RELATIONSHIP SELF       Abuse Screening:  Abuse Screening Questionnaire 4/26/2022   Do you ever feel afraid of your partner? N   Are you in a relationship with someone who physically or mentally threatens you? N   Is it safe for you to go home? Y       Fall Risk  Fall Risk Assessment, last 12 mths 3/16/2021   Able to walk? Yes   Fall in past 12 months? 0   Do you feel unsteady? -   Are you worried about falling -   Is TUG test greater than 12 seconds? -   Is the gait abnormal? -   Number of falls in past 12 months -   Fall with injury? -       Pt currently taking Anticoagulant therapy? Eliquis 5mg twice a day     Coordination of Care:  1. Have you been to the ER, urgent care clinic since your last visit? Hospitalized since your last visit? 2/28/22-3/16/22     2. Have you seen or consulted any other health care providers outside of the [address] [place] [address] since your last visit? Include any pap smears or colon screening.  no

## 2022-05-19 NOTE — TELEPHONE ENCOUNTER
This patient contacted office for the following prescriptions to be filled:    Last office visit: 3/22/22  Follow up appointment: 9/22/2022  Medication requested :   Requested Prescriptions     Pending Prescriptions Disp Refills    metoprolol tartrate (LOPRESSOR) 50 mg tablet 180 Tablet 3     PCP: sushila  Mail order or Local pharmacy name [facility] [phone]

## 2022-05-20 RX ORDER — METOPROLOL TARTRATE 50 MG/1
TABLET ORAL
Qty: 180 TABLET | Refills: 3 | Status: SHIPPED | OUTPATIENT
Start: 2022-05-20 | End: 2022-08-12 | Stop reason: SDUPTHER

## 2022-05-21 DIAGNOSIS — I11.0 BENIGN HYPERTENSIVE HEART DISEASE WITH SYSTOLIC CONGESTIVE HEART FAILURE, NYHA CLASS 2 (HCC): Chronic | ICD-10-CM

## 2022-05-21 DIAGNOSIS — I50.20 BENIGN HYPERTENSIVE HEART DISEASE WITH SYSTOLIC CONGESTIVE HEART FAILURE, NYHA CLASS 2 (HCC): Chronic | ICD-10-CM

## 2022-05-21 DIAGNOSIS — I50.22 CHRONIC SYSTOLIC HEART FAILURE (HCC): Chronic | ICD-10-CM

## 2022-05-23 RX ORDER — FUROSEMIDE 20 MG/1
TABLET ORAL
Qty: 90 TABLET | Refills: 1 | Status: SHIPPED | OUTPATIENT
Start: 2022-05-23

## 2022-08-12 NOTE — TELEPHONE ENCOUNTER
This patient contacted office for the following prescriptions to be filled:    Medication requested :   Requested Prescriptions     Pending Prescriptions Disp Refills    metoprolol tartrate (LOPRESSOR) 50 mg tablet 180 Tablet 3     Sig: TAKE 1 TABLET BY MOUTH TWO TIMES A DAY.      PCP: [address] [place] [address] Street or Print: [facility]  Mail order or Local pharmacy [address] [place] [address]    Scheduled appointment if not seen by current providers in office: LOV 3/22/2022 DANIEL 9/22/2022

## 2022-08-13 RX ORDER — METOPROLOL TARTRATE 50 MG/1
TABLET ORAL
Qty: 180 TABLET | Refills: 3 | Status: SHIPPED | OUTPATIENT
Start: 2022-08-13

## 2022-08-31 NOTE — OP NOTES
[address]    Name:  Liudmila Dorman  MR#:  [id number]  :  1960  Account #:  [de-identified]  Date of Adm:  06/15/2017  Date of Surgery:  06/15/2017      ATTENDING SURGEON: Rod Huddleston MD    PREOPERATIVE DIAGNOSIS: Bilateral lower extremity claudication  Freddy class 3. POSTOPERATIVE DIAGNOSIS: Bilateral lower extremity claudication  Hinckley class 3. PROCEDURES PERFORMED  1. Moderate conscious sedation of 55 minutes. 2. Right upper extremity angiogram.  3. Bilateral lower extremity angiogram.  4. Atherectomy and balloon angioplasty of the left superficial femoral  artery and above-knee popliteal artery. 5. Ultrasound-guided access of right axillary-bifemoral bypass at the  common femoral artery area. 6. Ultrasound-guided access of the right-to-left fem-fem bypass within  the right groin. CULTURES: None. SPECIMENS REMOVED: None. DRAINS: None. ESTIMATED BLOOD LOSS: Less than 50 mL. ANESTHESIA: Moderate conscious sedation of 55 minutes. This was  provided by the independent provider given the medications per my  discretion and monitoring the vital signs throughout the case. INDICATIONS FOR PROCEDURE: The patient is a 49-year-old  gentleman with claudication of bilateral lower extremities, left greater  than right. The patient was recommended for angiogram. The patient  was given risks and benefits of the procedure including but not limited  to bleeding, infection, damage to adjacent structures, MI, stroke and  death, as well as loss of lower extremities. The patient was  understanding of all the risks and underwent the procedure. OPERATIVE FINDINGS  1. Right subclavian artery is patent, without any evidence of stenosis. 2. Axillary artery is also patent, without any evidence of stenosis. 3. Axillary to femoral bypass is patent, without any evidence of  stenosis.   4. Fem-fem bypass is patent, without any evidence of stenosis. RIGHT LOWER EXTREMITY  1. Common femoral artery is patent, without stenosis. 4. Profunda femoris artery is patent, without stenosis. 5. Femoral to above-knee popliteal artery bypass is patent, without  stenosis. 6. Superficial femoral artery is occluded. 7. Above-knee popliteal artery is patent, without stenosis. 8. Below-knee popliteal artery appears to be occluded. 9. Anterior tibial artery is occluded proximally, but does appear to be  open distally. 10. TP trunk appears to be occluded. 11. Posterior tibial artery and peroneal arteries appear to be patent  proximally and reconstituted through collaterals, but it is difficult to see. LEFT LOWER EXTREMITY  1. Common femoral artery is patent, without stenosis. 2. Profunda femoris artery is patent, without stenosis. 3. Superficial femoral artery is patent, does have numerous narrowings  as high as 95% and heavily calcified. 4. Above-knee popliteal artery also shows an 80% narrowing within it. 5. Below-knee popliteal artery at its origin appears to have maybe a  40% to 50% narrowing heavily calcified lesion. 6. Anterior tibial artery is patent, does have numerous diseased  segments within it as high as 60%. 7. Tibioperoneal trunk artery is patent, without stenosis. 8. Peroneal artery is patent, does have numerous diseased segments  as high as 50%. 9. Posterior tibial artery is a dominant vessel going to the calf. Also has  disease throughout its courses, the highest would be 60%. DESCRIPTION OF PROCEDURE: The patient was correctly identified  in the pre-cath area and taken to the cath lab in stable condition. The  patient had a pre-incision timeout prior to any incision. The patient was  prepped and draped in normal sterile fashion according to CDC  guidelines with aseptic technique. We then used an ultrasound to  visualize ax-fem bypass. A picture was taken and kept with the  patient's chart.  We numbed him appropriately using 1% lidocaine, took  a single wall entry and gained access into the graft. Once the needle  tip was identified within the graft and we had positive blood return, a  mandril wire was placed. A small skin incision was created using 11  blade. A 4-Bahamian micropuncture sheath was then placed and our  dilator Mandril wire removed. Bentson wire was placed 4-Bahamian was  then placed. ContraFlush catheter was then placed into the proximal  subclavian artery on the right side. Angiogram was performed showing  the above findings. It was decided to then remove the ContraFlush  catheter and shoot a bilateral lower extremity through the sheath with  the below findings. It was then decided to go after the left leg. We  ultrasound-guided the fem-fem, got access into the fem-fem as above  with taking a picture and taping it with the patient's chart and placed a  6-Bahamian sheath in using QuickCross and Glidewire Advantage  wire. We gained access into the below-knee popliteal artery. This was  confirmed with angiography. Viper wire was placed. We then  heparinized the patient appropriately, created an atherectomy with a  1.5 mm Stealth Crown device, post-dilated with a 7 mm balloon. Repeat angiography showed complete resolution of the majority of the  narrowings. There was one area that did have a 20% residual  narrowing near the adductor hiatus, but we now had rapid blood flow  going down the left leg and no distal embolization, so we decided to go  ahead and conclude the case. We did close the 6-Bahamian sheath with a  6-Bahamian Angio-Seal, held pressure for 2 minutes and with good  hemostasis. The 4-Bahamian sheath will be pulled in holding. The patient  tolerated the procedure well without any issues.         MD ALFONSO Green / Karishma.Spatz  D:  06/15/2017   14:59  T:  06/15/2017   15:45  Job #:  [id number] Qbrexza Counseling:  I discussed with the patient the risks of Qbrexza including but not limited to headache, mydriasis, blurred vision, dry eyes, nasal dryness, dry mouth, dry throat, dry skin, urinary hesitation, and constipation.  Local skin reactions including erythema, burning, stinging, and itching can also occur.

## 2022-09-22 ENCOUNTER — OFFICE VISIT (OUTPATIENT)
Dept: FAMILY MEDICINE CLINIC | Age: 62
End: 2022-09-22
Payer: COMMERCIAL

## 2022-09-22 VITALS
SYSTOLIC BLOOD PRESSURE: 128 MMHG | TEMPERATURE: 98.3 F | RESPIRATION RATE: 16 BRPM | WEIGHT: 188 LBS | BODY MASS INDEX: 26.92 KG/M2 | HEIGHT: 70 IN | HEART RATE: 75 BPM | OXYGEN SATURATION: 98 % | DIASTOLIC BLOOD PRESSURE: 84 MMHG

## 2022-09-22 DIAGNOSIS — I73.9 PERIPHERAL ARTERY DISEASE (HCC): Primary | ICD-10-CM

## 2022-09-22 DIAGNOSIS — E78.5 DYSLIPIDEMIA: ICD-10-CM

## 2022-09-22 DIAGNOSIS — I70.213 ATHEROSCLEROSIS OF NATIVE ARTERY OF BOTH LOWER EXTREMITIES WITH INTERMITTENT CLAUDICATION (HCC): ICD-10-CM

## 2022-09-22 DIAGNOSIS — I74.09 AORTOILIAC OCCLUSIVE DISEASE (HCC): ICD-10-CM

## 2022-09-22 DIAGNOSIS — I50.22 CHRONIC SYSTOLIC HEART FAILURE (HCC): ICD-10-CM

## 2022-09-22 DIAGNOSIS — I48.0 PAROXYSMAL ATRIAL FIBRILLATION (HCC): ICD-10-CM

## 2022-09-22 DIAGNOSIS — Z89.612 STATUS POST ABOVE-KNEE AMPUTATION OF LEFT LOWER EXTREMITY (HCC): ICD-10-CM

## 2022-09-22 PROCEDURE — 99214 OFFICE O/P EST MOD 30 MIN: CPT | Performed by: FAMILY MEDICINE

## 2022-09-22 NOTE — PROGRESS NOTES
Javi Posey, 57 y.o.,  male    SUBJECTIVE  Ff-up  No new concerns     PAD - s/p AKA Left leg and complicated revasculariation (2017). R 4-5th digit amputation (2022). No pain, on pletal, asa, statin. ongoing vascular ff-ups, podiatry/vasc sx following. Afib- reviewed notes placed on cardizem, metoprolol and eliquis, following. dr. Head How not changes    H/o CAD s/p angioplasty- continued on asa, BB. Stress test 11/2020 low risk study. following Dr. Head How    HTN- currently on cozaar, metoprolol    ROS:  See HPI, all others negative        Patient Active Problem List   Diagnosis Code    Benign hypertensive heart disease with systolic congestive heart failure, NYHA class 2 (HCC) I11.0, I50.20    DDD (degenerative disc disease), lumbar M51.36    Erectile dysfunction N52.9    Spinal stenosis of lumbar region with radiculopathy M48.061, M54.16    Hereditary peripheral neuropathy G60.9    Aortoiliac occlusive disease (HCC) I74.09    Atherosclerosis of native artery of both lower extremities with intermittent claudication ([facility]) I70.213    Peripheral artery disease ([facility]) I73.9    Coronary artery disease involving native coronary artery of native heart I25.10    Atrial fibrillation ([facility]) I48.91    Acute blood loss as cause of postoperative anemia D62    Impaired mobility and ADLs Z74.09, Z78.9    Ischemic cardiomyopathy I25.5    Chronic systolic heart failure (HCC) I50.22    Carotid artery disease ([facility]) I77.9    Dyslipidemia E78.5    Euthyroid sick syndrome E07.81    Aftercare following surgery of the circulatory system Z48.812    Status post femoral-popliteal bypass surgery Z95.828    History of cardioversion Z98.890    Critical ischemia of lower extremity ([address] I70.229    History of vitamin D deficiency Z86.39    Pseudoaneurysm of femoral artery ([facility]) I72.4    Infection of vascular bypass graft ([address] T82. 7XXA    Chronic anemia D64.9    Chronic ulcer of right heel ([address] L97.419    Ischemic rest pain of lower extremity M79.606, I99.8    Prolonged Q-T interval on ECG R94.31    Status post above-knee amputation of left lower extremity ([facility] U88.921    Aftercare for amputation stump Z47.81    Moderate to severe pulmonary hypertension ([facility]) I27.20    Adverse effect of amiodarone T46.2X5A    Skin ulcer of malleolar area of right ankle ([facility] L97.319    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[facility]) I70.201    Occlusion of right femoral-popliteal bypass graft ([facility]) T82.898A       Current Outpatient Medications   Medication Sig Dispense Refill    metoprolol tartrate (LOPRESSOR) 50 mg tablet TAKE 1 TABLET BY MOUTH TWO TIMES A DAY. 180 Tablet 3    furosemide (LASIX) 20 mg tablet TAKE 1 TABLET DAILY 90 Tablet 1    cilostazoL (PLETAL) 50 mg tablet TAKE 1 TABLET BY MOUTH 2 TIMES PER DAY 1/2 HOUR BEFORE OR 2 HOURS AFTER BREAKFAST AND DINNER      apixaban (ELIQUIS) 5 mg tablet Take 1 Tablet by mouth two (2) times a day. 60 Tablet 5    dilTIAZem ER (CARDIZEM CD) 120 mg capsule Take 1 Capsule by mouth nightly. 30 Capsule 6    dilTIAZem ER (CARDIZEM CD) 180 mg capsule Take 1 tablet by mouth once every morning. 30 Capsule 6    losartan (COZAAR) 100 mg tablet Take 1 Tablet by mouth daily. 90 Tablet 3    atorvastatin (LIPITOR) 80 mg tablet TAKE 1 TABLET ONCE DAILY (Patient taking differently: Take 80 mg by mouth daily. TAKE 1 TABLET ONCE DAILY) 90 Tablet 3    aspirin 81 mg chewable tablet Take 1 Tab by mouth daily. 90 Tab 3    gabapentin (NEURONTIN) 400 mg capsule take 1 capsule by mouth three times a day 270 Cap 0    multivitamin (ONE A DAY) tablet Take 1 Tab by mouth daily. Allergies   Allergen Reactions    Morphine Other (comments)     Patient gets confused with morphine.        Past Medical History:   Diagnosis Date    Acute blood loss as cause of postoperative anemia 4/4/2017    Anticoagulated on Coumadin     Aortoiliac occlusive disease ([facility] 1/25/2017    Atherosclerosis of native artery of both lower extremities with intermittent claudication ([address] 1/25/2017    Benign hypertensive heart disease with systolic congestive heart failure, NYHA class 2 ([address] 1/26/2015    Carotid artery disease (HCC)     Chronic anemia     Chronic systolic heart failure (HCC)     Chronic ulcer of right foot ([address] 4/4/2017    Chronic ulcer of right heel ([address] 8/8/2017    Coronary artery disease involving native coronary artery of native heart 3/15/2017    Successful stenting of Cx (Xience LESLEY) and RCA (Xience LESLEY) to 0% by Dr. Lincoln Longoria on 3/15/17. DDD (degenerative disc disease), lumbar 1/26/2015    Dyslipidemia     Erectile dysfunction 7/5/2016    Euthyroid sick syndrome 4/6/2017    Hereditary peripheral neuropathy 11/15/2016    History of cardioversion 04/18/2017    S/P Synchronized external cardioversion (4/18/2017 - Dr. Angelique Palomo)    History of vitamin D deficiency 4/22/2017    Vitamin D 25-Hydroxy (4/22/2017) = 12.0; Vitamin D 25-Hydroxy (5/26/2017) = 38.7    Infection of vascular bypass graft ([address] 7/24/2017    Left lower extremity    Ischemic cardiomyopathy     Moderate to severe pulmonary hypertension ([address] 07/23/2017    Paroxysmal atrial fibrillation ([address]     Peripheral artery disease ([address] 1/25/2017    Skin ulcer of malleolar area of right ankle ([facility]     Spinal stenosis of lumbar region with radiculopathy 5/4/2015    Dr. Gab Mcpherson       Social History     Socioeconomic History    Marital status: LEGALLY      Spouse name: Not on file    Number of children: Not on file    Years of education: Not on file    Highest education level: Not on file   Occupational History    Not on file   Tobacco Use    Smoking status: Former    Smokeless tobacco: Never    Tobacco comments:     quit in 2011   Substance and Sexual Activity    Alcohol use:  Yes     Alcohol/week: 2.0 standard drinks     Types: 2 Cans of beer per week     Comment: 10 cans of beer a month    Drug use: Yes     Types: Marijuana     Comment: occasionally-last used 4/17    Sexual activity: Yes     Partners: Female   Other Topics Concern    Not on file   Social History Narrative    Not on file     Social Determinants of Health     Financial Resource Strain: Not on file   Food Insecurity: Not on file   Transportation Needs: Not on file   Physical Activity: Not on file   Stress: Not on file   Social Connections: Not on file   Intimate Partner Violence: Not on file   Housing Stability: Not on file       Family History   Problem Relation Age of Onset    Heart Disease Father          OBJECTIVE    Physical Exam:     Visit Vitals  /84 (BP 1 Location: Right arm, BP Patient Position: Sitting, BP Cuff Size: Large adult)   Pulse 75   Temp 98.3 °F (36.8 °C) (Temporal)   Resp 16   Ht 5' 10\" (1.778 m)   Wt 188 lb (85.3 kg)   SpO2 98%   BMI 26.98 kg/m²       General: alert, chronically ill-appearing,on wheelchair, in no apparent distress or pain  Neck: supple, no adenopathy palpated  CVS: normal rate, IRIR, distinct S1 and S2  Lungs:clear to ausculation bilaterally, no crackles, wheezing or rhonchi noted  Abdomen: normoactive bowel sounds, soft, non-tender  Extremities: L AKA prosthestic   Skin: no edema  Psych:  mood and affect normal        ASSESSMENT/PLAN  Diagnoses and all orders for this visit:       Peripheral artery disease ([facility]  S/p thrombectomy/balloon angioplasty 12/2021  S/p AKA 2017 L  S/p amputation R 4-5th toes and graft repair 2/2022  On asa,statin, pletaal  Following vasc sx   labs soon  -     CBC WITH AUTOMATED DIFF; Future  -     METABOLIC PANEL, COMPREHENSIVE; Future  -     LIPID PANEL; Future    Paroxysmal atrial fibrillation (HCC)  Rate controlled, anticoagulated  On xarelto, ccb, BB       CBC WITH AUTOMATED DIFF; Future  -     METABOLIC PANEL, COMPREHENSIVE;  Future  Following cardiology dr Charlie Ham     Dyslipidemia  LDL goal <70  Cont statin  Lipid panel 12/2021     Atherosclerosis of native artery of both lower extremities with intermittent claudication (HCC)  On asa, BB Status post above knee amputation Left lower extremity (HCC)    Amputation of toe of right foot (HCC)     Chronic systolic heart failure  Compensated  On bb/arb/lasix     Follow-up and Dispositions    Return in about 6 months (around 3/22/2023), or if symptoms worsen or fail to improve, for labs soon, routine chronic illness care. Patient understands plan of care. Patient has provided input and agrees with goals.

## 2022-09-22 NOTE — PROGRESS NOTES
Chief Complaint   Patient presents with    Hypertension    Irregular Heart Beat     A-Fib     Cholesterol Problem          1. \"Have you been to the ER, urgent care clinic since your last visit? Hospitalized since your last visit? \" No    2. \"Have you seen or consulted any other health care providers outside of the [address] [place] [address] since your last visit? \" No     3. For patients aged 39-70: Has the patient had a colonoscopy / FIT/ Cologuard? Yes - no Care Gap present      If the patient is female:    4. For patients aged 41-77: Has the patient had a mammogram within the past 2 years? NA - based on age or sex      11. For patients aged 21-65: Has the patient had a pap smear?  NA - based on age or sex

## 2022-10-26 ENCOUNTER — OFFICE VISIT (OUTPATIENT)
Dept: CARDIOLOGY CLINIC | Age: 62
End: 2022-10-26
Payer: COMMERCIAL

## 2022-10-26 VITALS — BODY MASS INDEX: 26.92 KG/M2 | WEIGHT: 188 LBS | HEIGHT: 70 IN

## 2022-10-26 DIAGNOSIS — I70.213 ATHEROSCLEROSIS OF NATIVE ARTERY OF BOTH LOWER EXTREMITIES WITH INTERMITTENT CLAUDICATION (HCC): Primary | ICD-10-CM

## 2022-10-26 DIAGNOSIS — I48.0 PAROXYSMAL ATRIAL FIBRILLATION (HCC): ICD-10-CM

## 2022-10-26 DIAGNOSIS — E78.5 DYSLIPIDEMIA: ICD-10-CM

## 2022-10-26 DIAGNOSIS — I25.10 CORONARY ARTERY DISEASE INVOLVING NATIVE CORONARY ARTERY OF NATIVE HEART WITHOUT ANGINA PECTORIS: ICD-10-CM

## 2022-10-26 DIAGNOSIS — I50.22 CHRONIC SYSTOLIC HEART FAILURE (HCC): ICD-10-CM

## 2022-10-26 DIAGNOSIS — I25.5 ISCHEMIC CARDIOMYOPATHY: ICD-10-CM

## 2022-10-26 PROCEDURE — 99214 OFFICE O/P EST MOD 30 MIN: CPT | Performed by: INTERNAL MEDICINE

## 2022-10-26 PROCEDURE — 93000 ELECTROCARDIOGRAM COMPLETE: CPT | Performed by: INTERNAL MEDICINE

## 2022-10-26 NOTE — PROGRESS NOTES
Massiel Michelle presents today for   Chief Complaint   Patient presents with    Follow-up     6 month follow up- no complaints            Massiel Michelle preferred language for health care discussion is english/other. Is someone accompanying this pt? no    Is the patient using any DME equipment during [address]? no    Depression Screening:  3 most recent PHQ Screens 10/26/2022   PHQ Not Done -   Little interest or pleasure in doing things Not at all   Feeling down, depressed, irritable, or hopeless Not at all   Total Score PHQ 2 0       Learning Assessment:  Learning Assessment 10/1/2019   PRIMARY LEARNER Patient   HIGHEST LEVEL OF EDUCATION - PRIMARY LEARNER  -   BARRIERS PRIMARY LEARNER -   [facility]    NEED -   LEARNER PREFERENCE PRIMARY LISTENING   ANSWERED BY patient   RELATIONSHIP SELF       Abuse Screening:  Abuse Screening Questionnaire 10/26/2022   Do you ever feel afraid of your partner? N   Are you in a relationship with someone who physically or mentally threatens you? N   Is it safe for you to go home? Y       Fall Risk  Fall Risk Assessment, last 12 mths 9/22/2022   Able to walk? Yes   Fall in past 12 months? 0   Do you feel unsteady? 0   Are you worried about falling 0   Is TUG test greater than 12 seconds? -   Is the gait abnormal? -   Number of falls in past 12 months -   Fall with injury? -       Pt currently taking Anticoagulant therapy? Eliquis 5mg twice a day     Coordination of Care:  1. Have you been to the ER, urgent care clinic since your last visit? Hospitalized since your last visit? no    2. Have you seen or consulted any other health care providers outside of the [address] [place] [address] since your last visit? Include any pap smears or colon screening.  no

## 2022-10-26 NOTE — PROGRESS NOTES
HISTORY OF PRESENT ILLNESS  Nimesh Grijalva is a 57 y.o. male. ASSESSMENT and PLAN    Mr. Faustino Fothergill, previous Dr. Viviane Dumont patient, has known history of PVD, as well as CAD. His coronary angiography back in 2017 showed EF 30% with mild left main and LAD disease. Circumflex had proximal 70% lesion as well as trifurcation healed ruptured plaque with residual 90% stenosis. RCA had diffuse 50% with focal mid 90% disease. Subsequently, he had his circumflex and RCA stented to 0%. His nuclear scan back in November 2020 showed low risk finding with EF 51%. 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 He has PAF, as well as dyslipidemia. He was hospitalized March 2021 at [facility] for right leg arterial thrombectomy. He was noted to be in atrial fibrillation. In February 2022, he was admitted with right femoral artery/popliteal artery bypass graft occlusion. During that hospitalization, echocardiogram revealed reduced EF. In 2022, he retired from . CAD:   Symptomatically stable. BP:   Well controlled at 124/68. CAF:   Rate controlled. It is at 81 bpm.  He remains on Eliquis. CHF:    There is no evidence of decompensated CHF noted. Weight:    His weight today is 188 pounds. His baseline weight is 190 pounds. His current weight is acceptable. Cholesterol:   Target LDL <70. Tobacco:    Denies active tobacco use. Anti-platelet:   Remains on Eliquis for CAF. I will see him back in 6 months. Thank you. Encounter Diagnoses   Name Primary?     Atherosclerosis of native artery of both lower extremities with intermittent claudication (HCC) Yes    Ischemic cardiomyopathy     Chronic systolic heart failure (HCC)     Paroxysmal atrial fibrillation (HCC)     Coronary artery disease involving native coronary artery of native heart without angina pectoris     Dyslipidemia      current treatment plan is effective, no change in therapy  lab results and schedule of future lab studies reviewed with patient  reviewed diet, exercise and weight control  cardiovascular risk and specific lipid/LDL goals reviewed  use of aspirin to prevent MI and TIA's discussed    Follow-up  Pertinent negatives include no chest pain and no shortness of breath. Today, Mr. Carlos Bustamante has no complaints of chest pains, increased shortness of breath or decreased exercise capacity. He denies any changes in his activity levels. It is somewhat limited because of his recent toe amputations on his right. He retired from heavy equipment use . He denies any orthopnea or PND. He denies any palpitations or dizziness. Review of Systems   Respiratory:  Negative for shortness of breath. Cardiovascular:  Negative for chest pain, palpitations, orthopnea, claudication, leg swelling and PND. All other systems reviewed and are negative. Physical Exam  Vitals and nursing note reviewed. HENT:      Head: Normocephalic. Eyes:      Conjunctiva/sclera: Conjunctivae normal.   Neck:      Vascular: No carotid bruit. Cardiovascular:      Rate and Rhythm: Normal rate. Rhythm irregular. Pulmonary:      Breath sounds: Normal breath sounds. Abdominal:      Palpations: Abdomen is soft. Musculoskeletal:         General: Swelling (Mild swelling noted on his right lower extremity; he has left AKA) present. Cervical back: No rigidity. Skin:     General: Skin is warm and dry. Neurological:      General: No focal deficit present. Mental Status: He is alert and oriented to person, place, and time.    Psychiatric:         Mood and Affect: Mood normal.         Behavior: Behavior normal.       PCP: Laron Raymond MD    Past Medical History:   Diagnosis Date    Acute blood loss as cause of postoperative anemia 4/4/2017    Anticoagulated on Coumadin     Aortoiliac occlusive disease ([facility] 1/25/2017    Atherosclerosis of native artery of both lower extremities with intermittent claudication ([address] 1/25/2017    Benign hypertensive heart disease with systolic congestive heart failure, NYHA class 2 ([address] 1/26/2015    Carotid artery disease (HCC)     Chronic anemia     Chronic systolic heart failure (HCC)     Chronic ulcer of right foot ([address] 4/4/2017    Chronic ulcer of right heel ([address] 8/8/2017    Coronary artery disease involving native coronary artery of native heart 3/15/2017    Successful stenting of Cx (Xience LESLEY) and RCA (Xience LESLEY) to 0% by Dr. Obi Eason on 3/15/17.     DDD (degenerative disc disease), lumbar 1/26/2015    Dyslipidemia     Erectile dysfunction 7/5/2016    Euthyroid sick syndrome 4/6/2017    Hereditary peripheral neuropathy 11/15/2016    History of cardioversion 04/18/2017    S/P Synchronized external cardioversion (4/18/2017 - Dr. Samantha Rockwell)    History of vitamin D deficiency 4/22/2017    Vitamin D 25-Hydroxy (4/22/2017) = 12.0; Vitamin D 25-Hydroxy (5/26/2017) = 38.7    Infection of vascular bypass graft ([address] 7/24/2017    Left lower extremity    Ischemic cardiomyopathy     Moderate to severe pulmonary hypertension ([address] 07/23/2017    Paroxysmal atrial fibrillation (HCC)     Peripheral artery disease ([address] 1/25/2017    Skin ulcer of malleolar area of right ankle ([address]     Spinal stenosis of lumbar region with radiculopathy 5/4/2015    Dr. Edgar Bassett       Past Surgical History:   Procedure Laterality Date    CARDIAC CATHETERIZATION  3/8/2017         CARDIAC CATHETERIZATION  3/15/2017         CORONARY STENT SINGLE W/PTCA  3/15/2017         HX ABOVE KNEE AMPUTATION Left 08/18/2017    S/P Left above-the-knee amputation (8/18/2017 - Dr. Donnell Goldberg)    HX ATHERECTOMY Left 06/15/2017    S/P Atherectomy and balloon angioplasty of the left superficial femoral artery and above-knee popliteal artery (6/15/2017 - Dr. Donnell Goldberg)    HX ATHERECTOMY Right 09/07/2017    S/P Atherectomy and balloon angioplasty of the below-the-knee popliteal artery, above-the-knee popliteal artery, tibioperoneal trunk artery and posterior tibial artery (9/7/2017 - Dr. Herbert Aguirre)    HX COLONOSCOPY  07/23/2015    polyps repeat 2020 5 years [address] Right 04/04/2017    S/P Right axillary to bifemoral artery bypass using an 8 mm Propaten graft from the right axilla to the right common femoral artery with a jump femoral-femoral bypass with another 8 mm Propaten external ring bypass; Right common femoral artery, and profunda femoris artery and superficial femoral artery endarterectomy causing an extensive surgery on the right side (4/4/2017 - Dr. Roxie Owusu)    [address] Right 04/07/2017    S/P Cutdown of femoral-femoral bypass, more on the left groin side; Right lower extremity angiography with first-order catheterization (4/7/2017 - Dr. Herbert Aguirre)    HX FEMORAL BYPASS Right 04/10/2017    S/P Right femoral to above-knee popliteal bypass with an 8 mm PTFE graft (4/10/2017 - Dr. Simone Frank)    HX OTHER SURGICAL Left 07/25/2017    S/P Removal of infected graft; Repair of left superficial femoral artery; Repair of left common femoral artery (7/25/2017 - Dr. Herbert Aguirre)    HX OTHER SURGICAL Right 06/27/2017    S/P Drainage of right side abscess (6/27/2017 - Dr. Herbert Aguirre)    HX OTHER SURGICAL Left 07/01/2017    S/P Left groin exploration; Left femoral repair pseudoaneurysm; Left wound washout; Wound VAC placement (7/01/2017 - Dr. Herbert Aguirre)    HX OTHER SURGICAL Left 07/04/2017    S/P Left common femoral artery ligation; Jump bypass from right to left fem-fem to a more distal superficial femoral artery using 8 mm external ringed Propaten graft; Left groin wound exploration and washout (7/4/2017 - Dr. Herbert Aguirre)    HX OTHER SURGICAL Right 08/18/2017    S/P Right axillary femoral jump bypass interposition; Removal of infected right axillary femoral bypass;  Wound VAC placement of upper thigh 1.2 cm x 5.2 cm x 1.8 cm and groin 4 cm x 0.5 cm x 0.2 cm (2017 - Dr. Cole Bruno)       Current Outpatient Medications   Medication Sig Dispense Refill    losartan (COZAAR) 100 mg tablet TAKE 1 TABLET ONCE DAILY 90 Tablet 1    atorvastatin (LIPITOR) 80 mg tablet Take 1 Tablet by mouth daily. TAKE 1 TABLET ONCE DAILY 90 Tablet 3    metoprolol tartrate (LOPRESSOR) 50 mg tablet TAKE 1 TABLET BY MOUTH TWO TIMES A DAY. 180 Tablet 3    furosemide (LASIX) 20 mg tablet TAKE 1 TABLET DAILY 90 Tablet 1    cilostazoL (PLETAL) 50 mg tablet TAKE 1 TABLET BY MOUTH 2 TIMES PER DAY 1/2 HOUR BEFORE OR 2 HOURS AFTER BREAKFAST AND DINNER      apixaban (ELIQUIS) 5 mg tablet Take 1 Tablet by mouth two (2) times a day. 60 Tablet 5    dilTIAZem ER (CARDIZEM CD) 120 mg capsule Take 1 Capsule by mouth nightly. 30 Capsule 6    dilTIAZem ER (CARDIZEM CD) 180 mg capsule Take 1 tablet by mouth once every morning. 30 Capsule 6    aspirin 81 mg chewable tablet Take 1 Tab by mouth daily. 90 Tab 3    gabapentin (NEURONTIN) 400 mg capsule take 1 capsule by mouth three times a day 270 Cap 0    multivitamin (ONE A DAY) tablet Take 1 Tab by mouth daily. The patient has a family history of    Social History     Tobacco Use    Smoking status: Former     Packs/day: 1.50     Years: 40.00     Pack years: 60.00     Types: Cigarettes     Quit date: 2011     Years since quittin.8    Smokeless tobacco: Never    Tobacco comments:     quit in    Substance Use Topics    Alcohol use:  Yes     Alcohol/week: 2.0 standard drinks     Types: 2 Cans of beer per week     Comment: 10 cans of beer a month    Drug use: Yes     Types: Marijuana     Comment: occasionally-last used        Lab Results   Component Value Date/Time    Cholesterol, total 83 12/15/2021 07:08 AM    HDL Cholesterol 22 (L) 12/15/2021 07:08 AM    LDL, calculated 43.2 12/15/2021 07:08 AM    Triglyceride 89 12/15/2021 07:08 AM CHOL/HDL Ratio 3.8 12/15/2021 07:08 AM        BP Readings from Last 3 Encounters:   09/22/22 128/84   04/26/22 130/86   04/19/22 110/70        Pulse Readings from Last 3 Encounters:   09/22/22 75   04/26/22 (!) 112   04/19/22 99       Wt Readings from Last 3 Encounters:   10/26/22 85.3 kg (188 lb)   09/22/22 85.3 kg (188 lb)   04/26/22 84.8 kg (187 lb)         EKG: unchanged from previous tracings, atrial fibrillation, rate 81 bpm.

## 2022-11-10 DIAGNOSIS — I50.20 BENIGN HYPERTENSIVE HEART DISEASE WITH SYSTOLIC CONGESTIVE HEART FAILURE, NYHA CLASS 2 (HCC): Chronic | ICD-10-CM

## 2022-11-10 DIAGNOSIS — I11.0 BENIGN HYPERTENSIVE HEART DISEASE WITH SYSTOLIC CONGESTIVE HEART FAILURE, NYHA CLASS 2 (HCC): Chronic | ICD-10-CM

## 2022-11-10 DIAGNOSIS — I50.22 CHRONIC SYSTOLIC HEART FAILURE (HCC): Chronic | ICD-10-CM

## 2022-11-10 RX ORDER — FUROSEMIDE 20 MG/1
TABLET ORAL
Qty: 90 TABLET | Refills: 1 | Status: SHIPPED | OUTPATIENT
Start: 2022-11-10

## 2022-12-14 NOTE — PROGRESS NOTES
Age-related nuclear cataract of both eyes  Discussed early cataracts with patient. Told patient that cataracts are age appropriate and they are not surgical at this time. No treatment recommended at this time. New glasses Rx given today, update as needed    Glaucoma suspect of both eyes  Discussed with patient that he is a glaucoma suspect based on increased cupping of the optic nerves in both eyes. Retinal photos taken today to document optic nerves. Glaucoma diagnostic testing ordered. Will not start medication, but will continue to observe. Patient verbalized understanding. Will see patient for next available visual field and OCT with no MD, if diagnostic testing is normal will see patient in 1 year for a diabetic exam    Type 1 diabetes mellitus without retinopathy ([address]  Diabetes type I: no background retinopathy, no signs of neovascularization noted. Discussed ocular and systemic benefits of blood sugar control. Patients daughter called states that he has a nickel sized area that needed to be looked at, patient ambulated to the room , patient then lifted up shirt and there is a pea sized raised area with redness quarter sized surrounding it, No drainage , pt denies fever . Discussed with provider and patient scheduled for OR .

## 2023-02-14 RX ORDER — METOPROLOL TARTRATE 50 MG/1
TABLET, FILM COATED ORAL
Qty: 180 TABLET | Refills: 1 | Status: SHIPPED | OUTPATIENT
Start: 2023-02-14

## 2023-02-14 NOTE — TELEPHONE ENCOUNTER
----- Message from [place] Ni sent at 2/14/2023  9:07 AM EST -----  Subject: Refill Request    QUESTIONS  Name of Medication? metoprolol tartrate (LOPRESSOR) 50 MG tablet  Patient-reported dosage and instructions? 2x daily  How many days do you have left? 1  Preferred Pharmacy? [id number]  Pharmacy phone number (if available)? [phone]  ---------------------------------------------------------------------------  --------------  CALL BACK INFO  What is the best way for the office to contact you? OK to leave message on   voicemail  Preferred Call Back Phone Number? [phone]  ---------------------------------------------------------------------------  --------------  SCRIPT ANSWERS  Relationship to Patient?  Self

## 2023-03-13 NOTE — TELEPHONE ENCOUNTER
This patient contacted office for the following prescriptions to be filled:    Medication requested : losartan (COZAAR) 100 MG tablet [       QTY 90   metoprolol tartrate (LOPRESSOR) 50 MG tablet        Refill   PCP: [address] [place] [address] or Print: [facility]  Mail order or [address] [place] [address]     Scheduled appointment if not seen by current providers in office: [address] [place] [address] 9/22/2022  3/22/23 changing pharmacy

## 2023-03-14 RX ORDER — METOPROLOL TARTRATE 50 MG/1
TABLET, FILM COATED ORAL
Qty: 180 TABLET | Refills: 1 | Status: SHIPPED | OUTPATIENT
Start: 2023-03-14

## 2023-03-14 RX ORDER — LOSARTAN POTASSIUM 100 MG/1
TABLET ORAL
Qty: 90 TABLET | Refills: 0 | Status: SHIPPED | OUTPATIENT
Start: 2023-03-14

## 2023-03-21 RX ORDER — DILTIAZEM HYDROCHLORIDE 180 MG/1
180 TABLET, EXTENDED RELEASE ORAL
COMMUNITY
Start: 2022-12-19

## 2023-03-21 RX ORDER — GABAPENTIN 100 MG/1
100 CAPSULE ORAL
COMMUNITY
Start: 2022-12-20

## 2023-03-21 RX ORDER — GABAPENTIN 300 MG/1
300 CAPSULE ORAL 2 TIMES DAILY
COMMUNITY
Start: 2022-12-31

## 2023-03-22 ENCOUNTER — OFFICE VISIT (OUTPATIENT)
Age: 63
End: 2023-03-22
Payer: MEDICARE

## 2023-03-22 VITALS
HEART RATE: 69 BPM | RESPIRATION RATE: 16 BRPM | TEMPERATURE: 98.3 F | OXYGEN SATURATION: 99 % | WEIGHT: 198 LBS | SYSTOLIC BLOOD PRESSURE: 120 MMHG | DIASTOLIC BLOOD PRESSURE: 76 MMHG | HEIGHT: 70 IN | BODY MASS INDEX: 28.35 KG/M2

## 2023-03-22 DIAGNOSIS — I50.22 CHRONIC SYSTOLIC HEART FAILURE (HCC): ICD-10-CM

## 2023-03-22 DIAGNOSIS — I48.91 ATRIAL FIBRILLATION, UNSPECIFIED TYPE (HCC): ICD-10-CM

## 2023-03-22 DIAGNOSIS — I73.9 PERIPHERAL ARTERY DISEASE (HCC): ICD-10-CM

## 2023-03-22 DIAGNOSIS — I73.9 PERIPHERAL ARTERY DISEASE (HCC): Primary | ICD-10-CM

## 2023-03-22 DIAGNOSIS — Z89.612 STATUS POST ABOVE-KNEE AMPUTATION OF LEFT LOWER EXTREMITY (HCC): ICD-10-CM

## 2023-03-22 DIAGNOSIS — E55.9 VITAMIN D DEFICIENCY: ICD-10-CM

## 2023-03-22 DIAGNOSIS — I70.213 ATHEROSCLEROSIS OF NATIVE ARTERY OF BOTH LOWER EXTREMITIES WITH INTERMITTENT CLAUDICATION (HCC): ICD-10-CM

## 2023-03-22 DIAGNOSIS — E78.5 DYSLIPIDEMIA: ICD-10-CM

## 2023-03-22 PROCEDURE — 99214 OFFICE O/P EST MOD 30 MIN: CPT | Performed by: FAMILY MEDICINE

## 2023-03-22 PROCEDURE — 1123F ACP DISCUSS/DSCN MKR DOCD: CPT | Performed by: FAMILY MEDICINE

## 2023-03-22 RX ORDER — DILTIAZEM HYDROCHLORIDE 120 MG/1
120 CAPSULE, COATED, EXTENDED RELEASE ORAL DAILY
COMMUNITY
Start: 2023-03-17 | End: 2023-03-22 | Stop reason: SDUPTHER

## 2023-03-22 SDOH — ECONOMIC STABILITY: FOOD INSECURITY: WITHIN THE PAST 12 MONTHS, THE FOOD YOU BOUGHT JUST DIDN'T LAST AND YOU DIDN'T HAVE MONEY TO GET MORE.: SOMETIMES TRUE

## 2023-03-22 SDOH — HEALTH STABILITY: PHYSICAL HEALTH: ON AVERAGE, HOW MANY MINUTES DO YOU ENGAGE IN EXERCISE AT THIS LEVEL?: 60 MIN

## 2023-03-22 SDOH — ECONOMIC STABILITY: INCOME INSECURITY: HOW HARD IS IT FOR YOU TO PAY FOR THE VERY BASICS LIKE FOOD, HOUSING, MEDICAL CARE, AND HEATING?: SOMEWHAT HARD

## 2023-03-22 SDOH — HEALTH STABILITY: PHYSICAL HEALTH: ON AVERAGE, HOW MANY DAYS PER WEEK DO YOU ENGAGE IN MODERATE TO STRENUOUS EXERCISE (LIKE A BRISK WALK)?: 3 DAYS

## 2023-03-22 SDOH — ECONOMIC STABILITY: HOUSING INSECURITY
IN THE LAST 12 MONTHS, WAS THERE A TIME WHEN YOU DID NOT HAVE A STEADY PLACE TO SLEEP OR SLEPT IN A SHELTER (INCLUDING NOW)?: NO

## 2023-03-22 SDOH — ECONOMIC STABILITY: FOOD INSECURITY: WITHIN THE PAST 12 MONTHS, YOU WORRIED THAT YOUR FOOD WOULD RUN OUT BEFORE YOU GOT MONEY TO BUY MORE.: NEVER TRUE

## 2023-03-22 ASSESSMENT — ANXIETY QUESTIONNAIRES
3. WORRYING TOO MUCH ABOUT DIFFERENT THINGS: 0
2. NOT BEING ABLE TO STOP OR CONTROL WORRYING: 0
6. BECOMING EASILY ANNOYED OR IRRITABLE: 0
4. TROUBLE RELAXING: 0
1. FEELING NERVOUS, ANXIOUS, OR ON EDGE: 0
7. FEELING AFRAID AS IF SOMETHING AWFUL MIGHT HAPPEN: 0

## 2023-03-22 ASSESSMENT — PATIENT HEALTH QUESTIONNAIRE - PHQ9
SUM OF ALL RESPONSES TO PHQ QUESTIONS 1-9: 0
2. FEELING DOWN, DEPRESSED OR HOPELESS: 0
SUM OF ALL RESPONSES TO PHQ QUESTIONS 1-9: 0
SUM OF ALL RESPONSES TO PHQ QUESTIONS 1-9: 0
5. POOR APPETITE OR OVEREATING: 0
DEPRESSION UNABLE TO ASSESS: FUNCTIONAL CAPACITY MOTIVATION LIMITS ACCURACY
4. FEELING TIRED OR HAVING LITTLE ENERGY: 0
8. MOVING OR SPEAKING SO SLOWLY THAT OTHER PEOPLE COULD HAVE NOTICED. OR THE OPPOSITE, BEING SO FIGETY OR RESTLESS THAT YOU HAVE BEEN MOVING AROUND A LOT MORE THAN USUAL: 0
1. LITTLE INTEREST OR PLEASURE IN DOING THINGS: 0
3. TROUBLE FALLING OR STAYING ASLEEP: 0
7. TROUBLE CONCENTRATING ON THINGS, SUCH AS READING THE NEWSPAPER OR WATCHING TELEVISION: 0
10. IF YOU CHECKED OFF ANY PROBLEMS, HOW DIFFICULT HAVE THESE PROBLEMS MADE IT FOR YOU TO DO YOUR WORK, TAKE CARE OF THINGS AT HOME, OR GET ALONG WITH OTHER PEOPLE: 0
9. THOUGHTS THAT YOU WOULD BE BETTER OFF DEAD, OR OF HURTING YOURSELF: 0
SUM OF ALL RESPONSES TO PHQ QUESTIONS 1-9: 0
SUM OF ALL RESPONSES TO PHQ9 QUESTIONS 1 & 2: 0
6. FEELING BAD ABOUT YOURSELF - OR THAT YOU ARE A FAILURE OR HAVE LET YOURSELF OR YOUR FAMILY DOWN: 0

## 2023-03-22 ASSESSMENT — SOCIAL DETERMINANTS OF HEALTH (SDOH)
HOW OFTEN DO YOU GET TOGETHER WITH FRIENDS OR RELATIVES?: NEVER
WITHIN THE LAST YEAR, HAVE YOU BEEN KICKED, HIT, SLAPPED, OR OTHERWISE PHYSICALLY HURT BY YOUR PARTNER OR EX-PARTNER?: NO
WITHIN THE LAST YEAR, HAVE YOU BEEN AFRAID OF YOUR PARTNER OR EX-PARTNER?: NO
IN A TYPICAL WEEK, HOW MANY TIMES DO YOU TALK ON THE PHONE WITH FAMILY, FRIENDS, OR NEIGHBORS?: NEVER
WITHIN THE LAST YEAR, HAVE TO BEEN RAPED OR FORCED TO HAVE ANY KIND OF SEXUAL ACTIVITY BY YOUR PARTNER OR EX-PARTNER?: NO
WITHIN THE LAST YEAR, HAVE YOU BEEN HUMILIATED OR EMOTIONALLY ABUSED IN OTHER WAYS BY YOUR PARTNER OR EX-PARTNER?: NO
DO YOU BELONG TO ANY CLUBS OR ORGANIZATIONS SUCH AS CHURCH GROUPS UNIONS, FRATERNAL OR ATHLETIC GROUPS, OR SCHOOL GROUPS?: NO
HOW OFTEN DO YOU ATTENT MEETINGS OF THE CLUB OR ORGANIZATION YOU BELONG TO?: NEVER
HOW OFTEN DO YOU ATTEND CHURCH OR RELIGIOUS SERVICES?: NEVER

## 2023-03-22 ASSESSMENT — LIFESTYLE VARIABLES
HOW OFTEN DO YOU HAVE A DRINK CONTAINING ALCOHOL: MONTHLY OR LESS
HOW MANY STANDARD DRINKS CONTAINING ALCOHOL DO YOU HAVE ON A TYPICAL DAY: 1 OR 2

## 2023-03-22 NOTE — PROGRESS NOTES
Chief Complaint   Patient presents with    Coronary Artery Disease    Congestive Heart Failure    Hypertension     A-Fib    Cholesterol Problem    Other     Hx of PAD        1. \"Have you been to the ER, urgent care clinic since your last visit? Hospitalized since your last visit? \" No    2. \"Have you seen or consulted any other health care providers outside of the [address] [place] [address] since your last visit? \" No     3. For patients aged 39-70: Has the patient had a colonoscopy / FIT/ Cologuard? Yes - Care Gap present. Most recent result on file last noted 07/23/2015 was due every 5 years      If the patient is female:    4. For patients aged 41-77: Has the patient had a mammogram within the past 2 years? NA - based on age or sex      11. For patients aged 21-65: Has the patient had a pap smear?  NA - based on age or sex
History:   Diagnosis Date    Acute blood loss as cause of postoperative anemia 4/4/2017    Anticoagulated on Coumadin     Aortoiliac occlusive disease ([address] 1/25/2017    Atherosclerosis of native artery of both lower extremities with intermittent claudication ([address] 1/25/2017    Benign hypertensive heart disease with systolic congestive heart failure, NYHA class 2 ([address] 1/26/2015    Carotid artery disease (HCC)     Chronic anemia     Chronic systolic heart failure (HCC)     Chronic ulcer of right foot ([address] 4/4/2017    Chronic ulcer of right heel ([address] 8/8/2017    Coronary artery disease involving native coronary artery of native heart 3/15/2017    Successful stenting of Cx (Xience KARINE) and RCA (Xience KARINE) to 0% by Dr. Nancy Vieira on 3/15/17.     DDD (degenerative disc disease), lumbar 1/26/2015    Dyslipidemia     Erectile dysfunction 7/5/2016    Euthyroid sick syndrome 4/6/2017    Hereditary peripheral neuropathy 11/15/2016    History of cardioversion 04/18/2017    S/P Synchronized external cardioversion (4/18/2017 - Dr. eLla Rios)    History of vitamin D deficiency 4/22/2017    Vitamin D 25-Hydroxy (4/22/2017) = 12.0; Vitamin D 25-Hydroxy (5/26/2017) = 38.7    Infection of vascular bypass graft ([address] 7/24/2017    Left lower extremity    Ischemic cardiomyopathy     Moderate to severe pulmonary hypertension ([address] 07/23/2017    Paroxysmal atrial fibrillation ([address]     Peripheral artery disease ([address] 1/25/2017    Skin ulcer of malleolar area of right ankle ([address]     Spinal stenosis of lumbar region with radiculopathy 5/4/2015    Dr. Delmar Veronica       Social History     Socioeconomic History    Marital status: LEGALLY      Spouse name: Not on file    Number of children: Not on file    Years of education: Not on file    Highest education level: Not on file   Occupational History    Not on file   Tobacco Use    Smoking status: Former    Smokeless tobacco: Never    Tobacco comments:     quit in 2011

## 2023-04-11 RX ORDER — ATORVASTATIN CALCIUM 80 MG/1
80 TABLET, FILM COATED ORAL DAILY
Qty: 90 TABLET | Refills: 3 | Status: SHIPPED | OUTPATIENT
Start: 2023-04-11 | End: 2023-04-14 | Stop reason: SDUPTHER

## 2023-04-19 ENCOUNTER — TELEPHONE (OUTPATIENT)
Age: 63
End: 2023-04-19

## 2023-04-26 NOTE — TELEPHONE ENCOUNTER
Called patients insurance for prior authz on medication. Override given. [facility] notified to cancel patients insurance and [facility] is aware that medication can be filled. Patient has been notified.

## 2023-05-02 ENCOUNTER — OFFICE VISIT (OUTPATIENT)
Age: 63
End: 2023-05-02
Payer: MEDICARE

## 2023-05-02 VITALS
OXYGEN SATURATION: 98 % | DIASTOLIC BLOOD PRESSURE: 100 MMHG | WEIGHT: 202 LBS | HEART RATE: 100 BPM | SYSTOLIC BLOOD PRESSURE: 120 MMHG | HEIGHT: 70 IN | BODY MASS INDEX: 28.92 KG/M2

## 2023-05-02 DIAGNOSIS — E78.5 HYPERLIPIDEMIA, UNSPECIFIED HYPERLIPIDEMIA TYPE: ICD-10-CM

## 2023-05-02 DIAGNOSIS — I48.0 PAROXYSMAL ATRIAL FIBRILLATION (HCC): ICD-10-CM

## 2023-05-02 DIAGNOSIS — I50.22 CHRONIC SYSTOLIC (CONGESTIVE) HEART FAILURE (HCC): ICD-10-CM

## 2023-05-02 DIAGNOSIS — I25.5 ISCHEMIC CARDIOMYOPATHY: ICD-10-CM

## 2023-05-02 DIAGNOSIS — I70.213 ATHEROSCLEROSIS OF NATIVE ARTERIES OF EXTREMITIES WITH INTERMITTENT CLAUDICATION, BILATERAL LEGS (HCC): Primary | ICD-10-CM

## 2023-05-02 DIAGNOSIS — I25.10 ATHEROSCLEROSIS OF NATIVE CORONARY ARTERY WITHOUT ANGINA PECTORIS, UNSPECIFIED WHETHER NATIVE OR TRANSPLANTED HEART: ICD-10-CM

## 2023-05-02 PROCEDURE — 93000 ELECTROCARDIOGRAM COMPLETE: CPT | Performed by: INTERNAL MEDICINE

## 2023-05-02 PROCEDURE — 99214 OFFICE O/P EST MOD 30 MIN: CPT | Performed by: INTERNAL MEDICINE

## 2023-05-02 PROCEDURE — 1123F ACP DISCUSS/DSCN MKR DOCD: CPT | Performed by: INTERNAL MEDICINE

## 2023-05-02 ASSESSMENT — PATIENT HEALTH QUESTIONNAIRE - PHQ9
SUM OF ALL RESPONSES TO PHQ QUESTIONS 1-9: 0
SUM OF ALL RESPONSES TO PHQ9 QUESTIONS 1 & 2: 0
2. FEELING DOWN, DEPRESSED OR HOPELESS: 0
SUM OF ALL RESPONSES TO PHQ QUESTIONS 1-9: 0
1. LITTLE INTEREST OR PLEASURE IN DOING THINGS: 0

## 2023-05-02 NOTE — PROGRESS NOTES
HISTORY OF PRESENT ILLNESS  Medhat Mendez  70 y.o. male     Chief Complaint   Patient presents with    6 Month Follow-Up     6 month     Edema     Rt leg swelling last week seen Podiatry        ASSESSMENT and PLAN    The primary encounter diagnosis was Atherosclerosis of native arteries of extremities with intermittent claudication, bilateral legs ([address] Diagnoses of Ischemic cardiomyopathy, Chronic systolic (congestive) heart failure (HCC), Paroxysmal atrial fibrillation ([address] Atherosclerosis of native coronary artery without angina pectoris, unspecified whether native or transplanted heart, and Hyperlipidemia, unspecified hyperlipidemia type were also pertinent to this visit. Mr. Angelic Fernandez, previous Dr. Mariam Duke patient, has known history of PVD, as well as CAD. His coronary angiography back in 2017 showed EF 30% with mild left main and LAD disease. Circumflex had proximal 70% lesion as well as trifurcation healed ruptured plaque with residual 90% stenosis. RCA had diffuse 50% with focal mid 90% disease. Subsequently, he had his circumflex and RCA stented to 0%. His nuclear scan back in November 2020 showed low risk finding with EF 51%. 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 He has CAF, as well as dyslipidemia. He was hospitalized March 2021 at [facility] for right leg arterial thrombectomy. He was noted to be in atrial fibrillation. In February 2022, he was admitted with right femoral artery/popliteal artery bypass graft occlusion. During that hospitalization, echocardiogram revealed reduced EF. In 2022, he retired from . CAD:    Clinically stable. However, he does have more noticeable ST changes. Have requested repeat pharmacologic nuclear scan. CAF:   Rate controlled at 100 bpm.  He remains on Eliquis. BP: Well controlled at 120/100. CHF:    There is no evidence of decompensated CHF noted.   Weight:     His weight today is 202

## 2023-05-02 NOTE — PROGRESS NOTES
Lupehiren Sindeena presents today for   Chief Complaint   Patient presents with    6 Month Follow-Up     6 month     Edema     Rt leg swelling last week seen Podiatry        Blanche Brown preferred language for health care discussion is english/other. Is someone accompanying this pt? no    Is the patient using any DME equipment during [address]? yes    Depression Screening:  Depression: Not at risk    PHQ-2 Score: 0        Learning Assessment:  Who is the primary learner? Patient    What is the preferred language for health care of the primary learner? ENGLISH    How does the primary learner prefer to learn new concepts? DEMONSTRATION    Answered By patient    Relationship to Learner SELF           Pt currently taking Anticoagulant therapy? no    Pt currently taking Antiplatelet therapy ? ASA 81 mg 1x daily       Coordination of Care:  1. Have you been to the ER, urgent care clinic since your last visit? Hospitalized since your last visit? no    2. Have you seen or consulted any other health care providers outside of the [address] [place] [address] since your last visit? Include any pap smears or colon screening.  no

## 2023-05-23 ENCOUNTER — HOSPITAL ENCOUNTER (OUTPATIENT)
Facility: HOSPITAL | Age: 63
Discharge: HOME | End: 2023-05-26

## 2023-05-23 LAB — SENTARA SPECIMEN COLLECTION: NORMAL

## 2023-05-24 LAB
A/G RATIO: 1.4 RATIO (ref 1.1–2.6)
ALBUMIN SERPL-MCNC: 4.2 G/DL (ref 3.5–5)
ALP BLD-CCNC: 177 U/L (ref 40–125)
ALT SERPL-CCNC: 21 U/L (ref 5–40)
ANION GAP SERPL CALCULATED.3IONS-SCNC: 12 MMOL/L (ref 3–15)
AST SERPL-CCNC: 24 U/L (ref 10–37)
BASOPHILS # BLD: 1 % (ref 0–2)
BASOPHILS ABSOLUTE: 0.1 K/UL (ref 0–0.2)
BILIRUB SERPL-MCNC: 0.5 MG/DL (ref 0.2–1.2)
BUN BLDV-MCNC: 13 MG/DL (ref 6–22)
CALCIUM SERPL-MCNC: 9.1 MG/DL (ref 8.4–10.5)
CHLORIDE BLD-SCNC: 109 MMOL/L (ref 98–110)
CHOLESTEROL/HDL RATIO: 3.8 (ref 0–5)
CHOLESTEROL: 88 MG/DL (ref 110–200)
CO2: 19 MMOL/L (ref 20–32)
CREAT SERPL-MCNC: 0.9 MG/DL (ref 0.8–1.6)
EOSINOPHIL # BLD: 1 % (ref 0–6)
EOSINOPHILS ABSOLUTE: 0.1 K/UL (ref 0–0.5)
GLOBULIN: 3 G/DL (ref 2–4)
GLOMERULAR FILTRATION RATE: >60 ML/MIN/1.73 SQ.M.
GLUCOSE: 95 MG/DL (ref 70–99)
HCT VFR BLD CALC: 45.1 % (ref 37.8–52.2)
HDLC SERPL-MCNC: 23 MG/DL
HEMOGLOBIN: 14.4 G/DL (ref 12.6–17.1)
LDL CHOLESTEROL CALCULATED: 48 MG/DL (ref 50–99)
LDL/HDL RATIO: 2.1
LYMPHOCYTES # BLD: 21 % (ref 20–45)
LYMPHOCYTES ABSOLUTE: 2.1 K/UL (ref 1–4.8)
MCH RBC QN AUTO: 31 PG (ref 26–34)
MCHC RBC AUTO-ENTMCNC: 32 G/DL (ref 31–36)
MCV RBC AUTO: 98 FL (ref 80–95)
MONOCYTES ABSOLUTE: 0.9 K/UL (ref 0.1–1)
MONOCYTES: 9 % (ref 3–12)
NEUTROPHILS ABSOLUTE: 6.9 K/UL (ref 1.8–7.7)
NEUTROPHILS: 68 % (ref 40–75)
NON-HDL CHOLESTEROL: 65 MG/DL
PDW BLD-RTO: 16.5 % (ref 10–15.5)
PLATELET # BLD: 373 K/UL (ref 140–440)
PMV BLD AUTO: 9.4 FL (ref 9–13)
POTASSIUM SERPL-SCNC: 4 MMOL/L (ref 3.5–5.5)
RBC: 4.6 M/UL (ref 3.8–5.8)
SODIUM BLD-SCNC: 140 MMOL/L (ref 133–145)
TOTAL PROTEIN: 7.2 G/DL (ref 6.2–8.1)
TRIGL SERPL-MCNC: 83 MG/DL (ref 40–149)
VITAMIN D 25-HYDROXY: 42.8 NG/ML (ref 32–100)
VLDLC SERPL CALC-MCNC: 17 MG/DL (ref 8–30)
WBC: 10.1 K/UL (ref 4–11)

## 2023-06-14 RX ORDER — LOSARTAN POTASSIUM 100 MG/1
TABLET ORAL
Qty: 90 TABLET | Refills: 3 | Status: SHIPPED | OUTPATIENT
Start: 2023-06-14

## 2023-06-14 RX ORDER — FUROSEMIDE 20 MG/1
20 TABLET ORAL DAILY
Qty: 90 TABLET | Refills: 1 | Status: SHIPPED | OUTPATIENT
Start: 2023-06-14

## 2023-06-14 RX ORDER — METOPROLOL TARTRATE 50 MG/1
TABLET, FILM COATED ORAL
Qty: 180 TABLET | Refills: 3 | Status: SHIPPED | OUTPATIENT
Start: 2023-06-14

## 2023-06-19 ENCOUNTER — TELEPHONE (OUTPATIENT)
Age: 63
End: 2023-06-19

## 2023-06-19 NOTE — TELEPHONE ENCOUNTER
Pt verified identification using name and . Pt given nuc scan results. Per Dr. Remy Mendieta  Pt verbalized understanding, no questions or concerns at this time.

## 2023-06-19 NOTE — TELEPHONE ENCOUNTER
----- Message from [id number] Perez MD sent at 6/15/2023  5:07 PM EDT -----  Let the patient know that his nuclear scan shows normal perfusion.  ----- Message -----  From: Coreen Jay RN  Sent: 6/5/2023   8:57 AM EDT  To: [id number] Perez MD    Per your last note:    CAD:    Clinically stable. However, he does have more noticeable ST changes. Have requested repeat pharmacologic nuclear scan.

## 2023-09-25 ENCOUNTER — OFFICE VISIT (OUTPATIENT)
Age: 63
End: 2023-09-25
Payer: MEDICARE

## 2023-09-25 VITALS
BODY MASS INDEX: 29.06 KG/M2 | RESPIRATION RATE: 16 BRPM | SYSTOLIC BLOOD PRESSURE: 124 MMHG | HEIGHT: 70 IN | OXYGEN SATURATION: 98 % | DIASTOLIC BLOOD PRESSURE: 82 MMHG | WEIGHT: 203 LBS | TEMPERATURE: 97.8 F | HEART RATE: 87 BPM

## 2023-09-25 DIAGNOSIS — I25.5 ISCHEMIC CARDIOMYOPATHY: ICD-10-CM

## 2023-09-25 DIAGNOSIS — E78.5 DYSLIPIDEMIA: ICD-10-CM

## 2023-09-25 DIAGNOSIS — I70.213 ATHEROSCLEROSIS OF NATIVE ARTERY OF BOTH LOWER EXTREMITIES WITH INTERMITTENT CLAUDICATION (HCC): ICD-10-CM

## 2023-09-25 DIAGNOSIS — I48.91 ATRIAL FIBRILLATION, UNSPECIFIED TYPE (HCC): ICD-10-CM

## 2023-09-25 DIAGNOSIS — I25.10 CORONARY ARTERY DISEASE INVOLVING NATIVE CORONARY ARTERY OF NATIVE HEART WITHOUT ANGINA PECTORIS: ICD-10-CM

## 2023-09-25 DIAGNOSIS — Z53.20 LUNG CANCER SCREENING DECLINED BY PATIENT: ICD-10-CM

## 2023-09-25 DIAGNOSIS — E55.9 VITAMIN D DEFICIENCY: ICD-10-CM

## 2023-09-25 DIAGNOSIS — I73.9 PERIPHERAL ARTERY DISEASE (HCC): ICD-10-CM

## 2023-09-25 DIAGNOSIS — I50.22 CHRONIC SYSTOLIC HEART FAILURE (HCC): ICD-10-CM

## 2023-09-25 DIAGNOSIS — Z89.612 STATUS POST ABOVE-KNEE AMPUTATION OF LEFT LOWER EXTREMITY (HCC): ICD-10-CM

## 2023-09-25 DIAGNOSIS — Z00.00 WELCOME TO MEDICARE PREVENTIVE VISIT: Primary | ICD-10-CM

## 2023-09-25 PROBLEM — T82.7XXA INFECTION OF VASCULAR BYPASS GRAFT (HCC): Status: RESOLVED | Noted: 2017-07-24 | Resolved: 2023-09-25

## 2023-09-25 PROBLEM — Z48.812 AFTERCARE FOLLOWING SURGERY OF THE CIRCULATORY SYSTEM: Status: RESOLVED | Noted: 2017-07-25 | Resolved: 2023-09-25

## 2023-09-25 PROBLEM — Z47.81 AFTERCARE FOR AMPUTATION STUMP: Status: RESOLVED | Noted: 2017-08-18 | Resolved: 2023-09-25

## 2023-09-25 PROBLEM — I70.229 CRITICAL ISCHEMIA OF LOWER EXTREMITY (HCC): Status: RESOLVED | Noted: 2017-04-10 | Resolved: 2023-09-25

## 2023-09-25 PROBLEM — E07.81 EUTHYROID SICK SYNDROME: Status: RESOLVED | Noted: 2017-04-06 | Resolved: 2023-09-25

## 2023-09-25 PROBLEM — Z86.39 HISTORY OF VITAMIN D DEFICIENCY: Status: RESOLVED | Noted: 2017-04-22 | Resolved: 2023-09-25

## 2023-09-25 PROCEDURE — G0402 INITIAL PREVENTIVE EXAM: HCPCS | Performed by: FAMILY MEDICINE

## 2023-09-25 PROCEDURE — 99214 OFFICE O/P EST MOD 30 MIN: CPT | Performed by: FAMILY MEDICINE

## 2023-09-25 PROCEDURE — 1123F ACP DISCUSS/DSCN MKR DOCD: CPT | Performed by: FAMILY MEDICINE

## 2023-09-25 RX ORDER — PREGABALIN 75 MG/1
75 CAPSULE ORAL 3 TIMES DAILY
COMMUNITY
Start: 2023-07-25

## 2023-09-25 ASSESSMENT — LIFESTYLE VARIABLES
HOW OFTEN DURING THE LAST YEAR HAVE YOU FOUND THAT YOU WERE NOT ABLE TO STOP DRINKING ONCE YOU HAD STARTED: 0
HOW MANY STANDARD DRINKS CONTAINING ALCOHOL DO YOU HAVE ON A TYPICAL DAY: 3 OR 4
HOW OFTEN DURING THE LAST YEAR HAVE YOU NEEDED AN ALCOHOLIC DRINK FIRST THING IN THE MORNING TO GET YOURSELF GOING AFTER A NIGHT OF HEAVY DRINKING: 0
HOW OFTEN DURING THE LAST YEAR HAVE YOU BEEN UNABLE TO REMEMBER WHAT HAPPENED THE NIGHT BEFORE BECAUSE YOU HAD BEEN DRINKING: 0
HOW OFTEN DO YOU HAVE A DRINK CONTAINING ALCOHOL: 2-3 TIMES A WEEK
HOW OFTEN DURING THE LAST YEAR HAVE YOU HAD A FEELING OF GUILT OR REMORSE AFTER DRINKING: 0
HAVE YOU OR SOMEONE ELSE BEEN INJURED AS A RESULT OF YOUR DRINKING: 0
HOW OFTEN DURING THE LAST YEAR HAVE YOU FAILED TO DO WHAT WAS NORMALLY EXPECTED FROM YOU BECAUSE OF DRINKING: 0
HAS A RELATIVE, FRIEND, DOCTOR, OR ANOTHER HEALTH PROFESSIONAL EXPRESSED CONCERN ABOUT YOUR DRINKING OR SUGGESTED YOU CUT DOWN: 0

## 2023-09-25 ASSESSMENT — PATIENT HEALTH QUESTIONNAIRE - PHQ9
SUM OF ALL RESPONSES TO PHQ QUESTIONS 1-9: 0
SUM OF ALL RESPONSES TO PHQ QUESTIONS 1-9: 0
SUM OF ALL RESPONSES TO PHQ9 QUESTIONS 1 & 2: 0
SUM OF ALL RESPONSES TO PHQ QUESTIONS 1-9: 0
2. FEELING DOWN, DEPRESSED OR HOPELESS: 0
1. LITTLE INTEREST OR PLEASURE IN DOING THINGS: 0
SUM OF ALL RESPONSES TO PHQ QUESTIONS 1-9: 0

## 2023-10-04 NOTE — TELEPHONE ENCOUNTER
This patient contacted office for the following prescriptions to be filled:    Medication requested : Furosemide 20 mg  PCP: [address] or Print: [facility]  Mail order or Local pharmacy [address]    Scheduled appointment if not seen by current providers in office: lov 9/25/2023 fu 3/25/2023

## 2023-10-06 RX ORDER — FUROSEMIDE 20 MG/1
20 TABLET ORAL DAILY
Qty: 90 TABLET | Refills: 0 | Status: SHIPPED | OUTPATIENT
Start: 2023-10-06

## 2023-10-06 NOTE — TELEPHONE ENCOUNTER
Patient called inquiring about his refill request for lasix. Patient states he took his last pill today.

## 2023-11-16 ENCOUNTER — HOSPITAL ENCOUNTER (OUTPATIENT)
Facility: HOSPITAL | Age: 63
Discharge: HOME | End: 2023-11-19

## 2023-11-16 LAB — SENTARA SPECIMEN COLLECTION: NORMAL

## 2023-11-17 LAB
A/G RATIO: 1.4 RATIO (ref 1.1–2.6)
ALBUMIN SERPL-MCNC: 4.3 G/DL (ref 3.5–5)
ALP BLD-CCNC: 176 U/L (ref 40–125)
ALT SERPL-CCNC: 17 U/L (ref 5–40)
ANION GAP SERPL CALCULATED.3IONS-SCNC: 13 MMOL/L (ref 3–15)
AST SERPL-CCNC: 21 U/L (ref 10–37)
BILIRUB SERPL-MCNC: 0.4 MG/DL (ref 0.2–1.2)
BUN BLDV-MCNC: 12 MG/DL (ref 6–22)
CALCIUM SERPL-MCNC: 9 MG/DL (ref 8.4–10.5)
CHLORIDE BLD-SCNC: 108 MMOL/L (ref 98–110)
CO2: 20 MMOL/L (ref 20–32)
CREAT SERPL-MCNC: 1 MG/DL (ref 0.8–1.6)
GLOBULIN: 3.1 G/DL (ref 2–4)
GLOMERULAR FILTRATION RATE: >60 ML/MIN/1.73 SQ.M.
GLUCOSE: 86 MG/DL (ref 70–99)
POTASSIUM SERPL-SCNC: 4.3 MMOL/L (ref 3.5–5.5)
SODIUM BLD-SCNC: 141 MMOL/L (ref 133–145)
TOTAL PROTEIN: 7.4 G/DL (ref 6.2–8.1)

## 2024-01-02 RX ORDER — FUROSEMIDE 20 MG/1
20 TABLET ORAL DAILY
Qty: 90 TABLET | Refills: 0 | Status: SHIPPED | OUTPATIENT
Start: 2024-01-02

## 2024-01-02 NOTE — TELEPHONE ENCOUNTER
This patient contacted the office for the following prescriptions to be refilled:    Medication requested :   Requested Prescriptions     Pending Prescriptions Disp Refills    furosemide (LASIX) 20 MG tablet [Pharmacy Med Name: FUROSEMIDE 20MG TABLETS] 90 tablet 0     Sig: TAKE 1 TABLET BY MOUTH DAILY      LAST REFILLED: 10/6/2023  PCP: Neeta Park MD  LOV: 9/25/2023  NOV: 3/25/2024  FUTURE APPT:   Future Appointments   Date Time Provider Department Center   1/26/2024  8:40 AM Nadeem Valenzuela MD [facility] BS AMB   3/25/2024  8:30 AM Neeta Park MD [facility] BS AMB     LABS:   Results for orders placed or performed during the hospital encounter of 11/16/23 (from the past 2160 hour(s))   [facility] specimen collection   Result Value Ref Range    [facility] Specimen Collection Specimens collected/sent to [facility]     Results for orders placed or performed in visit on 11/16/23 (from the past 2160 hour(s))   Comprehensive Metabolic Panel   Result Value Ref Range    Potassium 4.3 3.5 - 5.5 mmol/L    Sodium 141 133 - 145 mmol/L    Chloride 108 98 - 110 mmol/L    Glucose 86 70 - 99 mg/dL    Calcium 9.0 8.4 - 10.5 mg/dL    Albumin 4.3 3.5 - 5.0 g/dL    ALT 17 5 - 40 U/L    AST 21 10 - 37 U/L    Total Bilirubin 0.4 0.2 - 1.2 mg/dL    Alkaline Phosphatase 176 (H) 40 - 125 U/L    BUN 12 6 - 22 mg/dL    CO2 20 20 - 32 mmol/L    Creatinine 1.0 0.8 - 1.6 mg/dL    Glomerular Filtration Rate >60.0 >60.0 mL/min/1.73 sq.m.    Globulin 3.1 2.0 - 4.0 g/dL    Albumin/Globulin Ratio 1.4 1.1 - 2.6 ratio    Total Protein 7.4 6.2 - 8.1 g/dL    Anion Gap 13.0 3.0 - 15.0 mmol/L         Thank you.

## 2024-03-25 ENCOUNTER — OFFICE VISIT (OUTPATIENT)
Age: 64
End: 2024-03-25
Payer: MEDICARE

## 2024-03-25 VITALS
HEIGHT: 70 IN | RESPIRATION RATE: 18 BRPM | HEART RATE: 74 BPM | OXYGEN SATURATION: 98 % | BODY MASS INDEX: 29.13 KG/M2 | SYSTOLIC BLOOD PRESSURE: 122 MMHG | TEMPERATURE: 98 F | DIASTOLIC BLOOD PRESSURE: 80 MMHG

## 2024-03-25 DIAGNOSIS — Z95.828 STATUS POST FEMORAL-POPLITEAL BYPASS SURGERY: ICD-10-CM

## 2024-03-25 DIAGNOSIS — Z89.612 STATUS POST ABOVE-KNEE AMPUTATION OF LEFT LOWER EXTREMITY (HCC): ICD-10-CM

## 2024-03-25 DIAGNOSIS — R73.01 IFG (IMPAIRED FASTING GLUCOSE): ICD-10-CM

## 2024-03-25 DIAGNOSIS — E78.5 DYSLIPIDEMIA: Primary | ICD-10-CM

## 2024-03-25 DIAGNOSIS — I48.91 ATRIAL FIBRILLATION, UNSPECIFIED TYPE (HCC): ICD-10-CM

## 2024-03-25 DIAGNOSIS — S98.131A AMPUTATION OF TOE OF RIGHT FOOT (HCC): ICD-10-CM

## 2024-03-25 DIAGNOSIS — I25.5 ISCHEMIC CARDIOMYOPATHY: ICD-10-CM

## 2024-03-25 DIAGNOSIS — I74.09 AORTOILIAC OCCLUSIVE DISEASE (HCC): ICD-10-CM

## 2024-03-25 DIAGNOSIS — I70.213 ATHEROSCLEROSIS OF NATIVE ARTERY OF BOTH LOWER EXTREMITIES WITH INTERMITTENT CLAUDICATION (HCC): ICD-10-CM

## 2024-03-25 DIAGNOSIS — I27.20 MODERATE TO SEVERE PULMONARY HYPERTENSION (HCC): ICD-10-CM

## 2024-03-25 DIAGNOSIS — I50.22 CHRONIC SYSTOLIC HEART FAILURE (HCC): ICD-10-CM

## 2024-03-25 DIAGNOSIS — I73.9 PERIPHERAL ARTERY DISEASE (HCC): ICD-10-CM

## 2024-03-25 PROCEDURE — 99211 OFF/OP EST MAY X REQ PHY/QHP: CPT

## 2024-03-25 PROCEDURE — 1123F ACP DISCUSS/DSCN MKR DOCD: CPT | Performed by: FAMILY MEDICINE

## 2024-03-25 PROCEDURE — 99214 OFFICE O/P EST MOD 30 MIN: CPT | Performed by: FAMILY MEDICINE

## 2024-03-25 SDOH — ECONOMIC STABILITY: FOOD INSECURITY: WITHIN THE PAST 12 MONTHS, THE FOOD YOU BOUGHT JUST DIDN'T LAST AND YOU DIDN'T HAVE MONEY TO GET MORE.: NEVER TRUE

## 2024-03-25 SDOH — ECONOMIC STABILITY: FOOD INSECURITY: WITHIN THE PAST 12 MONTHS, YOU WORRIED THAT YOUR FOOD WOULD RUN OUT BEFORE YOU GOT MONEY TO BUY MORE.: NEVER TRUE

## 2024-03-25 SDOH — ECONOMIC STABILITY: INCOME INSECURITY: HOW HARD IS IT FOR YOU TO PAY FOR THE VERY BASICS LIKE FOOD, HOUSING, MEDICAL CARE, AND HEATING?: NOT VERY HARD

## 2024-03-25 ASSESSMENT — PATIENT HEALTH QUESTIONNAIRE - PHQ9
SUM OF ALL RESPONSES TO PHQ QUESTIONS 1-9: 0
SUM OF ALL RESPONSES TO PHQ QUESTIONS 1-9: 0
1. LITTLE INTEREST OR PLEASURE IN DOING THINGS: NOT AT ALL
SUM OF ALL RESPONSES TO PHQ QUESTIONS 1-9: 0
2. FEELING DOWN, DEPRESSED OR HOPELESS: NOT AT ALL
SUM OF ALL RESPONSES TO PHQ9 QUESTIONS 1 & 2: 0
SUM OF ALL RESPONSES TO PHQ QUESTIONS 1-9: 0

## 2024-03-25 NOTE — PROGRESS NOTES
\"Have you been to the ER, urgent care clinic since your last visit?  Hospitalized since your last visit?\"    NO    “Have you seen or consulted any other health care providers outside of [facility] since your last visit?”    NO        “Have you had a colorectal cancer screening such as a colonoscopy/FIT/Cologuard?    NO    Date of last Colonoscopy: 7/23/2015  No cologuard on file  No FIT/FOBT on file   No flexible sigmoidoscopy on file         Click Here for Release of Records Request  
right heel ([facility]) L97.419    Ischemic rest pain of lower extremity M79.606, I99.8    Prolonged Q-T interval on ECG R94.31    Status post above-knee amputation of left lower extremity ([facility]) Z89.612    Aftercare for amputation stump Z47.81    Moderate to severe pulmonary hypertension ([facility]) I27.20    Adverse effect of amiodarone T46.2X5A    Skin ulcer of malleolar area of right ankle ([facility]) L97.319    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[facility]) I70.201    Occlusion of right femoral-popliteal bypass graft ([facility]) T82.898A       Current Outpatient Medications:     furosemide (LASIX) 20 MG tablet, TAKE 1 TABLET BY MOUTH DAILY, Disp: 90 tablet, Rfl: 0    pregabalin (LYRICA) 75 MG capsule, Take 1 capsule by mouth 3 times daily., Disp: , Rfl:     losartan (COZAAR) 100 MG tablet, TAKE 1 TABLET ONCE DAILY, Disp: 90 tablet, Rfl: 3    metoprolol tartrate (LOPRESSOR) 50 MG tablet, TAKE 1 TABLET BY MOUTH TWO TIMES A DAY., Disp: 180 tablet, Rfl: 3    atorvastatin (LIPITOR) 80 MG tablet, Take 1 tablet by mouth daily, Disp: 90 tablet, Rfl: 3    Multiple Vitamin (ONE-A-DAY 55 PLUS PO), Take 1 tablet by mouth daily, Disp: , Rfl:     MATZIM  MG TB24 extended release tablet, Take 1 tablet by mouth every morning (before breakfast), Disp: , Rfl:     apixaban (ELIQUIS) 5 MG TABS tablet, Take 1 tablet by mouth 2 times daily, Disp: , Rfl:     aspirin 81 MG EC tablet, Take 1 tablet by mouth daily, Disp: , Rfl:     cilostazol (PLETAL) 50 MG tablet, TAKE 1 TABLET BY MOUTH 2 TIMES PER DAY 1/2 HOUR BEFORE OR 2 HOURS AFTER BREAKFAST AND DINNER, Disp: , Rfl:     dilTIAZem (TIAZAC) 120 MG extended release capsule, Take 1 capsule by mouth nightly, Disp: , Rfl:       Allergies   Allergen Reactions    Morphine Other (comments)     Patient gets confused with morphine.       Past Medical History:   Diagnosis Date    Acute blood loss as cause of postoperative anemia 4/4/2017    Anticoagulated on Coumadin     Aortoiliac occlusive disease ([facility]) 1/25/2017

## 2024-04-02 RX ORDER — FUROSEMIDE 20 MG/1
20 TABLET ORAL DAILY
Qty: 90 TABLET | Refills: 1 | Status: SHIPPED | OUTPATIENT
Start: 2024-04-02

## 2024-04-15 NOTE — TELEPHONE ENCOUNTER
This pharmacy faxed over request for the following prescriptions to be filled:    Medication requested :   atorvastatin (LIPITOR) 80 MG tablet QTY 90 Refill 1  PCP: Xavier  Pharmacy or Print: [facility]   Mail order or Local pharmacy [id number]    Scheduled appointment if not seen by current providers in office:   LOV 3/25/2024 DELGADO 9/26/2024

## 2024-04-16 RX ORDER — ATORVASTATIN CALCIUM 80 MG/1
80 TABLET, FILM COATED ORAL DAILY
Qty: 90 TABLET | Refills: 3 | Status: SHIPPED | OUTPATIENT
Start: 2024-04-16

## 2024-05-05 ENCOUNTER — APPOINTMENT (OUTPATIENT)
Facility: HOSPITAL | Age: 64
End: 2024-05-05
Payer: MEDICARE

## 2024-05-05 ENCOUNTER — HOSPITAL ENCOUNTER (INPATIENT)
Facility: HOSPITAL | Age: 64
LOS: 4 days | Discharge: HOME | End: 2024-05-09
Attending: STUDENT IN AN ORGANIZED HEALTH CARE EDUCATION/TRAINING PROGRAM | Admitting: SURGERY
Payer: MEDICARE

## 2024-05-05 DIAGNOSIS — I48.91 ATRIAL FIBRILLATION, UNSPECIFIED TYPE (HCC): ICD-10-CM

## 2024-05-05 DIAGNOSIS — I70.90 ARTERIAL OCCLUSION: Primary | ICD-10-CM

## 2024-05-05 DIAGNOSIS — I73.9 PAD (PERIPHERAL ARTERY DISEASE) (HCC): ICD-10-CM

## 2024-05-05 DIAGNOSIS — G89.18 POSTOPERATIVE PAIN: ICD-10-CM

## 2024-05-05 DIAGNOSIS — I48.91 ATRIAL FIBRILLATION WITH RAPID VENTRICULAR RESPONSE (HCC): ICD-10-CM

## 2024-05-05 LAB
ABO + RH BLD: NORMAL
ANION GAP SERPL CALC-SCNC: 7 MMOL/L (ref 3–18)
APTT PPP: 35.2 SEC (ref 23–36.4)
APTT PPP: 63.2 SEC (ref 23–36.4)
BASOPHILS # BLD: 0.1 K/UL (ref 0–0.1)
BASOPHILS NFR BLD: 1 % (ref 0–2)
BLOOD GROUP ANTIBODIES SERPL: NORMAL
BUN SERPL-MCNC: 9 MG/DL (ref 7–18)
BUN/CREAT SERPL: 9 (ref 12–20)
CALCIUM SERPL-MCNC: 9.4 MG/DL (ref 8.5–10.1)
CHLORIDE SERPL-SCNC: 107 MMOL/L (ref 100–111)
CO2 SERPL-SCNC: 24 MMOL/L (ref 21–32)
CREAT SERPL-MCNC: 1.01 MG/DL (ref 0.6–1.3)
DIFFERENTIAL METHOD BLD: ABNORMAL
EKG ATRIAL RATE: 150 BPM
EKG DIAGNOSIS: NORMAL
EKG Q-T INTERVAL: 348 MS
EKG QRS DURATION: 86 MS
EKG QTC CALCULATION (BAZETT): 479 MS
EKG R AXIS: 39 DEGREES
EKG T AXIS: -39 DEGREES
EKG VENTRICULAR RATE: 114 BPM
EOSINOPHIL # BLD: 0 K/UL (ref 0–0.4)
EOSINOPHIL NFR BLD: 0 % (ref 0–5)
ERYTHROCYTE [DISTWIDTH] IN BLOOD BY AUTOMATED COUNT: 15.9 % (ref 11.6–14.5)
ERYTHROCYTE [DISTWIDTH] IN BLOOD BY AUTOMATED COUNT: 16.2 % (ref 11.6–14.5)
FLUAV RNA SPEC QL NAA+PROBE: NOT DETECTED
FLUBV RNA SPEC QL NAA+PROBE: NOT DETECTED
GLUCOSE SERPL-MCNC: 110 MG/DL (ref 74–99)
HCT VFR BLD AUTO: 42.2 % (ref 36–48)
HCT VFR BLD AUTO: 43.5 % (ref 36–48)
HGB BLD-MCNC: 14.6 G/DL (ref 13–16)
HGB BLD-MCNC: 15.5 G/DL (ref 13–16)
IMM GRANULOCYTES # BLD AUTO: 0 K/UL (ref 0–0.04)
IMM GRANULOCYTES NFR BLD AUTO: 0 % (ref 0–0.5)
INR PPP: 1.3 (ref 0.9–1.1)
LYMPHOCYTES # BLD: 1.6 K/UL (ref 0.9–3.6)
LYMPHOCYTES NFR BLD: 15 % (ref 21–52)
MCH RBC QN AUTO: 30.7 PG (ref 24–34)
MCH RBC QN AUTO: 31.1 PG (ref 24–34)
MCHC RBC AUTO-ENTMCNC: 34.6 G/DL (ref 31–37)
MCHC RBC AUTO-ENTMCNC: 35.6 G/DL (ref 31–37)
MCV RBC AUTO: 87.3 FL (ref 78–100)
MCV RBC AUTO: 88.7 FL (ref 78–100)
MONOCYTES # BLD: 0.9 K/UL (ref 0.05–1.2)
MONOCYTES NFR BLD: 8 % (ref 3–10)
NEUTS SEG # BLD: 7.9 K/UL (ref 1.8–8)
NEUTS SEG NFR BLD: 75 % (ref 40–73)
NRBC # BLD: 0 K/UL (ref 0–0.01)
NRBC # BLD: 0 K/UL (ref 0–0.01)
NRBC BLD-RTO: 0 PER 100 WBC
NRBC BLD-RTO: 0 PER 100 WBC
PLATELET # BLD AUTO: 332 K/UL (ref 135–420)
PLATELET # BLD AUTO: 359 K/UL (ref 135–420)
PMV BLD AUTO: 9.1 FL (ref 9.2–11.8)
PMV BLD AUTO: 9.2 FL (ref 9.2–11.8)
POTASSIUM SERPL-SCNC: 3.6 MMOL/L (ref 3.5–5.5)
PROTHROMBIN TIME: 16.4 SEC (ref 11.9–14.7)
RBC # BLD AUTO: 4.76 M/UL (ref 4.35–5.65)
RBC # BLD AUTO: 4.98 M/UL (ref 4.35–5.65)
SARS-COV-2 RNA RESP QL NAA+PROBE: NOT DETECTED
SODIUM SERPL-SCNC: 138 MMOL/L (ref 136–145)
SPECIMEN EXP DATE BLD: NORMAL
WBC # BLD AUTO: 10.5 K/UL (ref 4.6–13.2)
WBC # BLD AUTO: 11.7 K/UL (ref 4.6–13.2)

## 2024-05-05 PROCEDURE — 2140000001 HC CVICU INTERMEDIATE R&B

## 2024-05-05 PROCEDURE — 6370000000 HC RX 637 (ALT 250 FOR IP): Performed by: STUDENT IN AN ORGANIZED HEALTH CARE EDUCATION/TRAINING PROGRAM

## 2024-05-05 PROCEDURE — 94761 N-INVAS EAR/PLS OXIMETRY MLT: CPT

## 2024-05-05 PROCEDURE — 86850 RBC ANTIBODY SCREEN: CPT

## 2024-05-05 PROCEDURE — 6360000002 HC RX W HCPCS: Performed by: STUDENT IN AN ORGANIZED HEALTH CARE EDUCATION/TRAINING PROGRAM

## 2024-05-05 PROCEDURE — 80048 BASIC METABOLIC PNL TOTAL CA: CPT

## 2024-05-05 PROCEDURE — 86901 BLOOD TYPING SEROLOGIC RH(D): CPT

## 2024-05-05 PROCEDURE — 86900 BLOOD TYPING SEROLOGIC ABO: CPT

## 2024-05-05 PROCEDURE — 93005 ELECTROCARDIOGRAM TRACING: CPT | Performed by: STUDENT IN AN ORGANIZED HEALTH CARE EDUCATION/TRAINING PROGRAM

## 2024-05-05 PROCEDURE — 99223 1ST HOSP IP/OBS HIGH 75: CPT | Performed by: STUDENT IN AN ORGANIZED HEALTH CARE EDUCATION/TRAINING PROGRAM

## 2024-05-05 PROCEDURE — 6360000004 HC RX CONTRAST MEDICATION: Performed by: STUDENT IN AN ORGANIZED HEALTH CARE EDUCATION/TRAINING PROGRAM

## 2024-05-05 PROCEDURE — 87636 SARSCOV2 & INF A&B AMP PRB: CPT

## 2024-05-05 PROCEDURE — 36415 COLL VENOUS BLD VENIPUNCTURE: CPT

## 2024-05-05 PROCEDURE — 75635 CT ANGIO ABDOMINAL ARTERIES: CPT

## 2024-05-05 PROCEDURE — 99285 EMERGENCY DEPT VISIT HI MDM: CPT

## 2024-05-05 PROCEDURE — 85025 COMPLETE CBC W/AUTO DIFF WBC: CPT

## 2024-05-05 PROCEDURE — 85730 THROMBOPLASTIN TIME PARTIAL: CPT

## 2024-05-05 PROCEDURE — 93010 ELECTROCARDIOGRAM REPORT: CPT | Performed by: INTERNAL MEDICINE

## 2024-05-05 PROCEDURE — 85027 COMPLETE CBC AUTOMATED: CPT

## 2024-05-05 PROCEDURE — 85610 PROTHROMBIN TIME: CPT

## 2024-05-05 PROCEDURE — 71045 X-RAY EXAM CHEST 1 VIEW: CPT

## 2024-05-05 RX ORDER — HEPARIN SODIUM 10000 [USP'U]/100ML
5-30 INJECTION, SOLUTION INTRAVENOUS CONTINUOUS
Status: DISCONTINUED | OUTPATIENT
Start: 2024-05-05 | End: 2024-05-06

## 2024-05-05 RX ORDER — HEPARIN SODIUM 1000 [USP'U]/ML
4000 INJECTION, SOLUTION INTRAVENOUS; SUBCUTANEOUS ONCE
Status: COMPLETED | OUTPATIENT
Start: 2024-05-05 | End: 2024-05-05

## 2024-05-05 RX ORDER — HEPARIN SODIUM 1000 [USP'U]/ML
4000 INJECTION, SOLUTION INTRAVENOUS; SUBCUTANEOUS PRN
Status: DISCONTINUED | OUTPATIENT
Start: 2024-05-05 | End: 2024-05-09

## 2024-05-05 RX ORDER — FUROSEMIDE 20 MG/1
20 TABLET ORAL DAILY
Status: DISCONTINUED | OUTPATIENT
Start: 2024-05-05 | End: 2024-05-09 | Stop reason: HOSPADM

## 2024-05-05 RX ORDER — LOSARTAN POTASSIUM 50 MG/1
100 TABLET ORAL DAILY
Status: DISCONTINUED | OUTPATIENT
Start: 2024-05-05 | End: 2024-05-09 | Stop reason: HOSPADM

## 2024-05-05 RX ORDER — PREGABALIN 75 MG/1
75 CAPSULE ORAL 3 TIMES DAILY
Status: DISCONTINUED | OUTPATIENT
Start: 2024-05-05 | End: 2024-05-09 | Stop reason: HOSPADM

## 2024-05-05 RX ORDER — ATORVASTATIN CALCIUM 40 MG/1
80 TABLET, FILM COATED ORAL DAILY
Status: DISCONTINUED | OUTPATIENT
Start: 2024-05-05 | End: 2024-05-09 | Stop reason: HOSPADM

## 2024-05-05 RX ORDER — HEPARIN SODIUM 1000 [USP'U]/ML
2000 INJECTION, SOLUTION INTRAVENOUS; SUBCUTANEOUS PRN
Status: DISCONTINUED | OUTPATIENT
Start: 2024-05-05 | End: 2024-05-09

## 2024-05-05 RX ORDER — CILOSTAZOL 50 MG/1
50 TABLET ORAL
Status: DISCONTINUED | OUTPATIENT
Start: 2024-05-05 | End: 2024-05-09 | Stop reason: HOSPADM

## 2024-05-05 RX ORDER — METOPROLOL TARTRATE 50 MG/1
50 TABLET, FILM COATED ORAL 2 TIMES DAILY
Status: DISCONTINUED | OUTPATIENT
Start: 2024-05-05 | End: 2024-05-09 | Stop reason: HOSPADM

## 2024-05-05 RX ADMIN — HEPARIN SODIUM 4000 UNITS: 1000 INJECTION INTRAVENOUS; SUBCUTANEOUS at 17:43

## 2024-05-05 RX ADMIN — LOSARTAN POTASSIUM 100 MG: 50 TABLET, FILM COATED ORAL at 17:46

## 2024-05-05 RX ADMIN — IOPAMIDOL 100 ML: 755 INJECTION, SOLUTION INTRAVENOUS at 09:08

## 2024-05-05 RX ADMIN — FUROSEMIDE 20 MG: 20 TABLET ORAL at 17:47

## 2024-05-05 RX ADMIN — PREGABALIN 75 MG: 75 CAPSULE ORAL at 20:17

## 2024-05-05 RX ADMIN — HEPARIN SODIUM 4000 UNITS: 1000 INJECTION INTRAVENOUS; SUBCUTANEOUS at 09:41

## 2024-05-05 RX ADMIN — HEPARIN SODIUM 11 UNITS/KG/HR: 10000 INJECTION, SOLUTION INTRAVENOUS at 09:42

## 2024-05-05 RX ADMIN — METOPROLOL TARTRATE 50 MG: 50 TABLET, FILM COATED ORAL at 21:41

## 2024-05-05 RX ADMIN — ATORVASTATIN CALCIUM 80 MG: 40 TABLET, FILM COATED ORAL at 17:46

## 2024-05-05 RX ADMIN — CILOSTAZOL 50 MG: 50 TABLET ORAL at 17:46

## 2024-05-05 ASSESSMENT — PAIN SCALES - GENERAL
PAINLEVEL_OUTOF10: 0

## 2024-05-05 NOTE — PLAN OF CARE
Problem: Discharge Planning  Goal: Discharge to home or other facility with appropriate resources  Outcome: Progressing  Flowsheets (Taken 5/5/2024 1126)  Discharge to home or other facility with appropriate resources: Identify discharge learning needs (meds, wound care, etc)     Problem: Skin/Tissue Integrity  Goal: Absence of new skin breakdown  Description: 1.  Monitor for areas of redness and/or skin breakdown  2.  Assess vascular access sites hourly  3.  Every 4-6 hours minimum:  Change oxygen saturation probe site  4.  Every 4-6 hours:  If on nasal continuous positive airway pressure, respiratory therapy assess nares and determine need for appliance change or resting period.  Outcome: Progressing     Problem: Safety - Adult  Goal: Free from fall injury  Outcome: Progressing     Problem: ABCDS Injury Assessment  Goal: Absence of physical injury  Outcome: Progressing     Problem: Pain  Goal: Verbalizes/displays adequate comfort level or baseline comfort level  Outcome: Progressing     Problem: Cardiovascular - Adult  Goal: Maintains optimal cardiac output and hemodynamic stability  Outcome: Progressing     Problem: Musculoskeletal - Adult  Goal: Return mobility to safest level of function  Outcome: Progressing

## 2024-05-05 NOTE — ED NOTES
TRANSFER - OUT REPORT:    Verbal report given to VICKI Cifuentes on Jorden Artis  being transferred to T SD for routine progression of patient care       Report consisted of patient's Situation, Background, Assessment and   Recommendations(SBAR).     Information from the following report(s) ED Encounter Summary, ED SBAR, Adult Overview, and MAR was reviewed with the receiving nurse.    [place] Fall Assessment:    Presents to emergency department  because of falls (Syncope, seizure, or loss of consciousness): No  Age > 70: No  Altered Mental Status, Intoxication with alcohol or substance confusion (Disorientation, impaired judgment, poor safety awaremess, or inability to follow instructions): No  Impaired Mobility: Ambulates or transfers with assistive devices or assistance; Unable to ambulate or transer.: Yes  Nursing Judgement: Yes          Lines:   Peripheral IV 05/05/24 Right Antecubital (Active)        Opportunity for questions and clarification was provided.      Patient transported with:  Registered Nurse

## 2024-05-05 NOTE — PROGRESS NOTES
Bedside shift change report given to VICKI Oseguera (oncoming nurse) by VICKI Varela (offgoing nurse). Report included the following information Nurse Handoff Report, Med Rec Status, and Cardiac Rhythm Afib .      1933: A&O x 4, denies any chest pain o discomfort, skin warm with right lower extremities cool to touch with redness noted, assessment done, all needs within reach.    2100: /65 given his due dose of metoprolol.    2300: reassessment done, no complains of pain or discomfort, urinal emptied, all needs attended.    0200: PTT in therapeutic level, K- 3.2 mmol, sent secure message to DR Valentin- waiting for orders.    0258: K replacement given as per protocol.    0545: bath wipes with chlorhexidine rendered, gown and linens changed, kept on NPO for procedure today.    Bedside shift change report given to VICKI Varela (oncoming nurse) by VICKI Oseguera (offgoing nurse). Report included the following information Nurse Handoff Report, Cardiac Rhythm Afib, and Alarm Parameters.

## 2024-05-05 NOTE — ED TRIAGE NOTES
Received patient in triage stating that he was seen by his Vascular MD's office on Friday and was told to come into ED today to be cleared for surgery. Pt has hx of PAD with Left AKA. Pt presents with RLE coolness. Pt stated that he has a blockage in his right thigh. Right foot is cool to touch with no palpable pedal pulse. Unable to find pulse with doppler in triage room.

## 2024-05-05 NOTE — H&P
History & Physical    Patient: Jorden Artis MRN: [id number]  CSN: [id number]    YOB: 1960  Age: 64 y.o.  Sex: male      DOA: 5/5/2024    Chief Complaint:   Chief Complaint   Patient presents with    Circulatory Problem          HPI:     Jorden Artis is a 64 y.o.  male with hx of HTN, systolic congestive heart disease, CAD, atrial fibrillation.    Patient reported to vascular office on Friday after experiencing numbness and tingling in his foot. States he can usually feel the pulse in his bypass graft in his leg but was unable to feel pulses. Reported to [facility] ED at vascular request.     Upon interview - patient in afib. States he took his morning medications. Does not have list and cannot state medications he is on.     Pain currently controlled.     Past Medical History:   Diagnosis Date    Acute blood loss as cause of postoperative anemia 4/4/2017    Anticoagulated on Coumadin     Aortoiliac occlusive disease (HCC) 1/25/2017    Atherosclerosis of native artery of both lower extremities with intermittent claudication (HCC) 1/25/2017    Benign hypertensive heart disease with systolic congestive heart failure, NYHA class 2 ([facility]) 1/26/2015    Carotid artery disease (HCC)     Chronic anemia     Chronic systolic heart failure (HCC)     Chronic ulcer of right foot (HCC) 4/4/2017    Chronic ulcer of right heel (HCC) 8/8/2017    Coronary artery disease involving native coronary artery of native heart 3/15/2017    Successful stenting of Cx (Xience KARINE) and RCA (Xience KARINE) to 0% by Dr. Valenzuela on 3/15/17.    DDD (degenerative disc disease), lumbar 1/26/2015    Dyslipidemia     Erectile dysfunction 7/5/2016    Euthyroid sick syndrome 4/6/2017    Hereditary peripheral neuropathy 11/15/2016    History of cardioversion 04/18/2017    S/P Synchronized external cardioversion (4/18/2017 - Dr. Daniel Morales)    History of vitamin D deficiency 4/22/2017    Vitamin D 25-Hydroxy

## 2024-05-05 NOTE — PLAN OF CARE
Problem: Discharge Planning  Goal: Discharge to home or other facility with appropriate resources  5/5/2024 1942 by Oumar Verde RN  Outcome: Progressing  5/5/2024 1809 by Nichole Pinon RN  Outcome: Progressing  Flowsheets (Taken 5/5/2024 1126)  Discharge to home or other facility with appropriate resources: Identify discharge learning needs (meds, wound care, etc)     Problem: Skin/Tissue Integrity  Goal: Absence of new skin breakdown  Description: 1.  Monitor for areas of redness and/or skin breakdown  2.  Assess vascular access sites hourly  3.  Every 4-6 hours minimum:  Change oxygen saturation probe site  4.  Every 4-6 hours:  If on nasal continuous positive airway pressure, respiratory therapy assess nares and determine need for appliance change or resting period.  5/5/2024 1942 by Oumar Verde RN  Outcome: Progressing  5/5/2024 1809 by Nichole Pinon RN  Outcome: Progressing     Problem: Safety - Adult  Goal: Free from fall injury  5/5/2024 1942 by Oumar Verde RN  Outcome: Progressing  5/5/2024 1809 by Nichole Pinon RN  Outcome: Progressing     Problem: ABCDS Injury Assessment  Goal: Absence of physical injury  5/5/2024 1942 by Oumar Verde RN  Outcome: Progressing  5/5/2024 1809 by Nichole Pinon RN  Outcome: Progressing     Problem: Pain  Goal: Verbalizes/displays adequate comfort level or baseline comfort level  5/5/2024 1942 by Oumar Verde RN  Outcome: Progressing  5/5/2024 1809 by Nichole Pinon RN  Outcome: Progressing     Problem: Cardiovascular - Adult  Goal: Maintains optimal cardiac output and hemodynamic stability  5/5/2024 1942 by Oumar Verde RN  Outcome: Progressing  5/5/2024 1809 by Nichole Pinon RN  Outcome: Progressing     Problem: Musculoskeletal - Adult  Goal: Return mobility to safest level of function  5/5/2024 1942 by Oumar Verde RN  Outcome: Progressing  5/5/2024 1809 by Nichole Pinon RN  Outcome:

## 2024-05-05 NOTE — ED PROVIDER NOTES
EMERGENCY DEPARTMENT HISTORY AND PHYSICAL EXAM      Date: 5/5/2024  Patient Name: Jorden Artis    History of Presenting Illness     Chief Complaint   Patient presents with    Circulatory Problem       History (Context): Jorden Artis is a 64 y.o. male  presents to the ED today with chief complaint of right lower extremity pain and pallor.  Patient states he was seen this past Friday for an office visit by his vascular surgery team and recommended to come in the emergency department today for admission and likely need for surgery.  Patient states that he continues to have pain to the right lower extremity but denies any further systemic signs of infection at this time.  Was told to stop his prescribed Eliquis this past Friday due to upcoming surgery.      PCP: Neeta Park MD    No current facility-administered medications for this encounter.     Current Outpatient Medications   Medication Sig Dispense Refill    atorvastatin (LIPITOR) 80 MG tablet Take 1 tablet by mouth daily 90 tablet 3    furosemide (LASIX) 20 MG tablet TAKE 1 TABLET BY MOUTH DAILY 90 tablet 1    pregabalin (LYRICA) 75 MG capsule Take 1 capsule by mouth 3 times daily.      losartan (COZAAR) 100 MG tablet TAKE 1 TABLET ONCE DAILY 90 tablet 3    metoprolol tartrate (LOPRESSOR) 50 MG tablet TAKE 1 TABLET BY MOUTH TWO TIMES A DAY. 180 tablet 3    Multiple Vitamin (ONE-A-DAY 55 PLUS PO) Take 1 tablet by mouth daily      MATZIM  MG TB24 extended release tablet Take 1 tablet by mouth every morning (before breakfast)      apixaban (ELIQUIS) 5 MG TABS tablet Take 1 tablet by mouth 2 times daily      aspirin 81 MG EC tablet Take 1 tablet by mouth daily      cilostazol (PLETAL) 50 MG tablet TAKE 1 TABLET BY MOUTH 2 TIMES PER DAY 1/2 HOUR BEFORE OR 2 HOURS AFTER BREAKFAST AND DINNER      dilTIAZem (TIAZAC) 120 MG extended release capsule Take 1 capsule by mouth nightly         Past History     Past Medical History:   Past Medical  obtaining further imaging, lab work, starting heparin drip, and admission to hospitalist. [DV]      ED Course User Index  [DV] Mario Morse Jr., DO           Procedures:  Procedures      Rhythm interpretation from monitor: Lauren      Social Determinants of Health: None       Supplemental Historians include: Patient       Documentation/Prior Results Review:  Old medical records.  Previous electrocardiograms.  Nursing notes.      Discussion of Mangement with other Physicians, QHP or Appropriate Source:  Hospitalist, [facility] surgery    Critical Care Time:  The services I provided to this patient were to treat and/or prevent clinically significant deterioration that could result in the failure of one or more body systems and/or organ systems due to Arterial occlusion    Services included the following:  -reviewing nursing notes and old charts  -vital sign assessments  -direct patient care  -medication orders and management  -interpreting and reviewing diagnostic studies/labs  -re-evaluations  -documentation time    Aggregate critical care time was 34 minutes, which includes only time during which I was engaged in work directly related to the patient's care as described above, whether I was at bedside or elsewhere in the Emergency Department. It did not include time spent performing other reported procedures or the services of residents, students, or nurses.    Mario Morse DO    7:16 AM        Diagnosis and Disposition     CLINICAL IMPRESSION:  No diagnosis found.     Medication List        ASK your doctor about these medications      apixaban 5 MG Tabs tablet  Commonly known as: ELIQUIS     aspirin 81 MG EC tablet     atorvastatin 80 MG tablet  Commonly known as: LIPITOR  Take 1 tablet by mouth daily     cilostazol 50 MG tablet  Commonly known as: PLETAL     dilTIAZem 120 MG extended release capsule  Commonly known as: TIAZAC     furosemide 20 MG tablet  Commonly known as: LASIX  TAKE 1 TABLET BY MOUTH DAILY

## 2024-05-06 ENCOUNTER — APPOINTMENT (OUTPATIENT)
Facility: HOSPITAL | Age: 64
End: 2024-05-06
Payer: MEDICARE

## 2024-05-06 ENCOUNTER — ANESTHESIA EVENT (OUTPATIENT)
Dept: CARDIOTHORACIC SURGERY | Facility: HOSPITAL | Age: 64
End: 2024-05-06
Payer: MEDICARE

## 2024-05-06 ENCOUNTER — ANESTHESIA (OUTPATIENT)
Dept: CARDIOTHORACIC SURGERY | Facility: HOSPITAL | Age: 64
End: 2024-05-06
Payer: MEDICARE

## 2024-05-06 PROBLEM — I82.4Z1: Status: ACTIVE | Noted: 2024-05-06

## 2024-05-06 LAB
ANION GAP SERPL CALC-SCNC: 5 MMOL/L (ref 3–18)
ANION GAP SERPL CALC-SCNC: 5 MMOL/L (ref 3–18)
APTT PPP: 152.5 SEC (ref 23–36.4)
APTT PPP: 56.3 SEC (ref 23–36.4)
APTT PPP: 90 SEC (ref 23–36.4)
BUN SERPL-MCNC: 14 MG/DL (ref 7–18)
BUN SERPL-MCNC: 9 MG/DL (ref 7–18)
BUN/CREAT SERPL: 10 (ref 12–20)
BUN/CREAT SERPL: 13 (ref 12–20)
CALCIUM SERPL-MCNC: 8.1 MG/DL (ref 8.5–10.1)
CALCIUM SERPL-MCNC: 8.6 MG/DL (ref 8.5–10.1)
CHLORIDE SERPL-SCNC: 107 MMOL/L (ref 100–111)
CHLORIDE SERPL-SCNC: 110 MMOL/L (ref 100–111)
CO2 SERPL-SCNC: 21 MMOL/L (ref 21–32)
CO2 SERPL-SCNC: 24 MMOL/L (ref 21–32)
CREAT SERPL-MCNC: 0.9 MG/DL (ref 0.6–1.3)
CREAT SERPL-MCNC: 1.11 MG/DL (ref 0.6–1.3)
GLUCOSE SERPL-MCNC: 104 MG/DL (ref 74–99)
GLUCOSE SERPL-MCNC: 179 MG/DL (ref 74–99)
MAGNESIUM SERPL-MCNC: 2.1 MG/DL (ref 1.6–2.6)
POTASSIUM SERPL-SCNC: 3.2 MMOL/L (ref 3.5–5.5)
POTASSIUM SERPL-SCNC: 3.7 MMOL/L (ref 3.5–5.5)
POTASSIUM SERPL-SCNC: 4 MMOL/L (ref 3.5–5.5)
SODIUM SERPL-SCNC: 133 MMOL/L (ref 136–145)
SODIUM SERPL-SCNC: 139 MMOL/L (ref 136–145)

## 2024-05-06 PROCEDURE — 6360000002 HC RX W HCPCS: Performed by: NURSE ANESTHETIST, CERTIFIED REGISTERED

## 2024-05-06 PROCEDURE — 2580000003 HC RX 258: Performed by: NURSE ANESTHETIST, CERTIFIED REGISTERED

## 2024-05-06 PROCEDURE — 2100000000 HC CCU R&B

## 2024-05-06 PROCEDURE — 6360000004 HC RX CONTRAST MEDICATION: Performed by: SURGERY

## 2024-05-06 PROCEDURE — 94761 N-INVAS EAR/PLS OXIMETRY MLT: CPT

## 2024-05-06 PROCEDURE — 80048 BASIC METABOLIC PNL TOTAL CA: CPT

## 2024-05-06 PROCEDURE — 99232 SBSQ HOSP IP/OBS MODERATE 35: CPT | Performed by: STUDENT IN AN ORGANIZED HEALTH CARE EDUCATION/TRAINING PROGRAM

## 2024-05-06 PROCEDURE — C1757 CATH, THROMBECTOMY/EMBOLECT: HCPCS | Performed by: SURGERY

## 2024-05-06 PROCEDURE — 85730 THROMBOPLASTIN TIME PARTIAL: CPT

## 2024-05-06 PROCEDURE — 3600000012 HC SURGERY LEVEL 2 ADDTL 15MIN: Performed by: SURGERY

## 2024-05-06 PROCEDURE — 2700000000 HC OXYGEN THERAPY PER DAY

## 2024-05-06 PROCEDURE — 2500000003 HC RX 250 WO HCPCS: Performed by: SURGERY

## 2024-05-06 PROCEDURE — 2720000010 HC SURG SUPPLY STERILE: Performed by: SURGERY

## 2024-05-06 PROCEDURE — B41F1ZZ FLUOROSCOPY OF RIGHT LOWER EXTREMITY ARTERIES USING LOW OSMOLAR CONTRAST: ICD-10-PCS | Performed by: SURGERY

## 2024-05-06 PROCEDURE — 3600000002 HC SURGERY LEVEL 2 BASE: Performed by: SURGERY

## 2024-05-06 PROCEDURE — 51798 US URINE CAPACITY MEASURE: CPT

## 2024-05-06 PROCEDURE — 3700000001 HC ADD 15 MINUTES (ANESTHESIA): Performed by: SURGERY

## 2024-05-06 PROCEDURE — A4217 STERILE WATER/SALINE, 500 ML: HCPCS | Performed by: SURGERY

## 2024-05-06 PROCEDURE — 6370000000 HC RX 637 (ALT 250 FOR IP): Performed by: SURGERY

## 2024-05-06 PROCEDURE — 04CP0ZZ EXTIRPATION OF MATTER FROM RIGHT ANTERIOR TIBIAL ARTERY, OPEN APPROACH: ICD-10-PCS | Performed by: SURGERY

## 2024-05-06 PROCEDURE — 6360000002 HC RX W HCPCS: Performed by: STUDENT IN AN ORGANIZED HEALTH CARE EDUCATION/TRAINING PROGRAM

## 2024-05-06 PROCEDURE — 6360000002 HC RX W HCPCS: Performed by: SURGERY

## 2024-05-06 PROCEDURE — 84132 ASSAY OF SERUM POTASSIUM: CPT

## 2024-05-06 PROCEDURE — 2500000003 HC RX 250 WO HCPCS: Performed by: NURSE ANESTHETIST, CERTIFIED REGISTERED

## 2024-05-06 PROCEDURE — 2580000003 HC RX 258: Performed by: PHYSICIAN ASSISTANT

## 2024-05-06 PROCEDURE — 04CK0ZZ EXTIRPATION OF MATTER FROM RIGHT FEMORAL ARTERY, OPEN APPROACH: ICD-10-PCS | Performed by: SURGERY

## 2024-05-06 PROCEDURE — 6370000000 HC RX 637 (ALT 250 FOR IP): Performed by: STUDENT IN AN ORGANIZED HEALTH CARE EDUCATION/TRAINING PROGRAM

## 2024-05-06 PROCEDURE — 3700000000 HC ANESTHESIA ATTENDED CARE: Performed by: SURGERY

## 2024-05-06 PROCEDURE — C1769 GUIDE WIRE: HCPCS | Performed by: SURGERY

## 2024-05-06 PROCEDURE — C1894 INTRO/SHEATH, NON-LASER: HCPCS | Performed by: SURGERY

## 2024-05-06 PROCEDURE — 047P3ZZ DILATION OF RIGHT ANTERIOR TIBIAL ARTERY, PERCUTANEOUS APPROACH: ICD-10-PCS | Performed by: SURGERY

## 2024-05-06 PROCEDURE — 36415 COLL VENOUS BLD VENIPUNCTURE: CPT

## 2024-05-06 PROCEDURE — 2500000003 HC RX 250 WO HCPCS: Performed by: PHYSICIAN ASSISTANT

## 2024-05-06 PROCEDURE — 2709999900 HC NON-CHARGEABLE SUPPLY: Performed by: SURGERY

## 2024-05-06 PROCEDURE — 6370000000 HC RX 637 (ALT 250 FOR IP): Performed by: INTERNAL MEDICINE

## 2024-05-06 PROCEDURE — C1725 CATH, TRANSLUMIN NON-LASER: HCPCS | Performed by: SURGERY

## 2024-05-06 PROCEDURE — 83735 ASSAY OF MAGNESIUM: CPT

## 2024-05-06 PROCEDURE — 2580000003 HC RX 258: Performed by: SURGERY

## 2024-05-06 RX ORDER — HEPARIN SODIUM 5000 [USP'U]/ML
INJECTION, SOLUTION INTRAVENOUS; SUBCUTANEOUS PRN
Status: DISCONTINUED | OUTPATIENT
Start: 2024-05-06 | End: 2024-05-06 | Stop reason: HOSPADM

## 2024-05-06 RX ORDER — HYDROCODONE BITARTRATE AND ACETAMINOPHEN 5; 325 MG/1; MG/1
1 TABLET ORAL EVERY 6 HOURS PRN
Status: DISCONTINUED | OUTPATIENT
Start: 2024-05-06 | End: 2024-05-09 | Stop reason: HOSPADM

## 2024-05-06 RX ORDER — MAGNESIUM SULFATE IN WATER 40 MG/ML
2000 INJECTION, SOLUTION INTRAVENOUS PRN
Status: DISCONTINUED | OUTPATIENT
Start: 2024-05-06 | End: 2024-05-09 | Stop reason: HOSPADM

## 2024-05-06 RX ORDER — CEFAZOLIN SODIUM 1 G/3ML
INJECTION, POWDER, FOR SOLUTION INTRAMUSCULAR; INTRAVENOUS PRN
Status: DISCONTINUED | OUTPATIENT
Start: 2024-05-06 | End: 2024-05-06 | Stop reason: SDUPTHER

## 2024-05-06 RX ORDER — FENTANYL CITRATE 50 UG/ML
INJECTION, SOLUTION INTRAMUSCULAR; INTRAVENOUS PRN
Status: DISCONTINUED | OUTPATIENT
Start: 2024-05-06 | End: 2024-05-06 | Stop reason: SDUPTHER

## 2024-05-06 RX ORDER — KETOROLAC TROMETHAMINE 15 MG/ML
INJECTION, SOLUTION INTRAMUSCULAR; INTRAVENOUS PRN
Status: DISCONTINUED | OUTPATIENT
Start: 2024-05-06 | End: 2024-05-06 | Stop reason: SDUPTHER

## 2024-05-06 RX ORDER — POTASSIUM CHLORIDE 20 MEQ/1
40 TABLET, EXTENDED RELEASE ORAL PRN
Status: DISCONTINUED | OUTPATIENT
Start: 2024-05-06 | End: 2024-05-09 | Stop reason: HOSPADM

## 2024-05-06 RX ORDER — HEPARIN SODIUM 10000 [USP'U]/100ML
5-30 INJECTION, SOLUTION INTRAVENOUS CONTINUOUS
Status: DISCONTINUED | OUTPATIENT
Start: 2024-05-06 | End: 2024-05-09

## 2024-05-06 RX ORDER — PROPOFOL 10 MG/ML
INJECTION, EMULSION INTRAVENOUS PRN
Status: DISCONTINUED | OUTPATIENT
Start: 2024-05-06 | End: 2024-05-06 | Stop reason: SDUPTHER

## 2024-05-06 RX ORDER — HEPARIN SODIUM 1000 [USP'U]/ML
INJECTION, SOLUTION INTRAVENOUS; SUBCUTANEOUS PRN
Status: DISCONTINUED | OUTPATIENT
Start: 2024-05-06 | End: 2024-05-06 | Stop reason: SDUPTHER

## 2024-05-06 RX ORDER — SODIUM CHLORIDE 9 MG/ML
INJECTION, SOLUTION INTRAVENOUS CONTINUOUS PRN
Status: DISCONTINUED | OUTPATIENT
Start: 2024-05-06 | End: 2024-05-06 | Stop reason: SDUPTHER

## 2024-05-06 RX ORDER — SODIUM CHLORIDE 450 MG/100ML
INJECTION, SOLUTION INTRAVENOUS CONTINUOUS
Status: DISCONTINUED | OUTPATIENT
Start: 2024-05-06 | End: 2024-05-09 | Stop reason: HOSPADM

## 2024-05-06 RX ORDER — CEFAZOLIN SODIUM 1 G/3ML
INJECTION, POWDER, FOR SOLUTION INTRAMUSCULAR; INTRAVENOUS
Status: COMPLETED
Start: 2024-05-06 | End: 2024-05-06

## 2024-05-06 RX ORDER — LIDOCAINE HYDROCHLORIDE 10 MG/ML
INJECTION, SOLUTION EPIDURAL; INFILTRATION; INTRACAUDAL; PERINEURAL PRN
Status: DISCONTINUED | OUTPATIENT
Start: 2024-05-06 | End: 2024-05-06 | Stop reason: HOSPADM

## 2024-05-06 RX ORDER — DEXAMETHASONE SODIUM PHOSPHATE 4 MG/ML
INJECTION, SOLUTION INTRA-ARTICULAR; INTRALESIONAL; INTRAMUSCULAR; INTRAVENOUS; SOFT TISSUE PRN
Status: DISCONTINUED | OUTPATIENT
Start: 2024-05-06 | End: 2024-05-06 | Stop reason: SDUPTHER

## 2024-05-06 RX ORDER — MAGNESIUM SULFATE HEPTAHYDRATE 500 MG/ML
INJECTION, SOLUTION INTRAMUSCULAR; INTRAVENOUS PRN
Status: DISCONTINUED | OUTPATIENT
Start: 2024-05-06 | End: 2024-05-06 | Stop reason: SDUPTHER

## 2024-05-06 RX ORDER — HEPARIN SODIUM 200 [USP'U]/100ML
INJECTION, SOLUTION INTRAVENOUS
Status: DISPENSED
Start: 2024-05-06 | End: 2024-05-06

## 2024-05-06 RX ORDER — MAGNESIUM HYDROXIDE 1200 MG/15ML
LIQUID ORAL CONTINUOUS PRN
Status: COMPLETED | OUTPATIENT
Start: 2024-05-06 | End: 2024-05-06

## 2024-05-06 RX ORDER — ONDANSETRON 2 MG/ML
INJECTION INTRAMUSCULAR; INTRAVENOUS PRN
Status: DISCONTINUED | OUTPATIENT
Start: 2024-05-06 | End: 2024-05-06 | Stop reason: SDUPTHER

## 2024-05-06 RX ORDER — POTASSIUM CHLORIDE 7.45 MG/ML
10 INJECTION INTRAVENOUS PRN
Status: DISCONTINUED | OUTPATIENT
Start: 2024-05-06 | End: 2024-05-09 | Stop reason: HOSPADM

## 2024-05-06 RX ORDER — IODIXANOL 320 MG/ML
INJECTION, SOLUTION INTRAVASCULAR PRN
Status: DISCONTINUED | OUTPATIENT
Start: 2024-05-06 | End: 2024-05-06 | Stop reason: HOSPADM

## 2024-05-06 RX ORDER — LIDOCAINE HYDROCHLORIDE 20 MG/ML
INJECTION, SOLUTION EPIDURAL; INFILTRATION; INTRACAUDAL; PERINEURAL PRN
Status: DISCONTINUED | OUTPATIENT
Start: 2024-05-06 | End: 2024-05-06 | Stop reason: SDUPTHER

## 2024-05-06 RX ADMIN — SODIUM CHLORIDE 80 ML/HR: 4.5 INJECTION, SOLUTION INTRAVENOUS at 14:45

## 2024-05-06 RX ADMIN — FENTANYL CITRATE 50 MCG: 50 INJECTION INTRAMUSCULAR; INTRAVENOUS at 10:41

## 2024-05-06 RX ADMIN — PREGABALIN 75 MG: 75 CAPSULE ORAL at 08:28

## 2024-05-06 RX ADMIN — MAGNESIUM SULFATE HEPTAHYDRATE 1 G: 500 INJECTION, SOLUTION INTRAMUSCULAR; INTRAVENOUS at 10:41

## 2024-05-06 RX ADMIN — FUROSEMIDE 20 MG: 20 TABLET ORAL at 08:28

## 2024-05-06 RX ADMIN — PREGABALIN 75 MG: 75 CAPSULE ORAL at 16:57

## 2024-05-06 RX ADMIN — HYDROMORPHONE HYDROCHLORIDE 0.5 MG: 1 INJECTION, SOLUTION INTRAMUSCULAR; INTRAVENOUS; SUBCUTANEOUS at 22:04

## 2024-05-06 RX ADMIN — ATORVASTATIN CALCIUM 80 MG: 40 TABLET, FILM COATED ORAL at 08:29

## 2024-05-06 RX ADMIN — SODIUM CHLORIDE: 9 INJECTION, SOLUTION INTRAVENOUS at 10:47

## 2024-05-06 RX ADMIN — CILOSTAZOL 50 MG: 50 TABLET ORAL at 06:52

## 2024-05-06 RX ADMIN — LIDOCAINE HYDROCHLORIDE 40 MG: 20 INJECTION, SOLUTION EPIDURAL; INFILTRATION; INTRACAUDAL; PERINEURAL at 10:41

## 2024-05-06 RX ADMIN — HYDROCODONE BITARTRATE AND ACETAMINOPHEN 1 TABLET: 5; 325 TABLET ORAL at 18:12

## 2024-05-06 RX ADMIN — CEFAZOLIN 2 G: 330 INJECTION, POWDER, FOR SOLUTION INTRAMUSCULAR; INTRAVENOUS at 11:04

## 2024-05-06 RX ADMIN — HEPARIN SODIUM 7000 UNITS: 1000 INJECTION, SOLUTION INTRAVENOUS; SUBCUTANEOUS at 11:21

## 2024-05-06 RX ADMIN — PHENYLEPHRINE HYDROCHLORIDE 100 MCG: 10 INJECTION INTRAVENOUS at 11:21

## 2024-05-06 RX ADMIN — HEPARIN SODIUM 4000 UNITS: 1000 INJECTION INTRAVENOUS; SUBCUTANEOUS at 23:28

## 2024-05-06 RX ADMIN — POTASSIUM CHLORIDE 40 MEQ: 1500 TABLET, EXTENDED RELEASE ORAL at 02:56

## 2024-05-06 RX ADMIN — SODIUM CHLORIDE 5 MG/HR: 900 INJECTION, SOLUTION INTRAVENOUS at 19:20

## 2024-05-06 RX ADMIN — CILOSTAZOL 50 MG: 50 TABLET ORAL at 16:57

## 2024-05-06 RX ADMIN — FENTANYL CITRATE 50 MCG: 50 INJECTION INTRAMUSCULAR; INTRAVENOUS at 11:19

## 2024-05-06 RX ADMIN — ONDANSETRON 4 MG: 2 INJECTION INTRAMUSCULAR; INTRAVENOUS at 10:41

## 2024-05-06 RX ADMIN — KETOROLAC TROMETHAMINE 15 MG: 15 INJECTION, SOLUTION INTRAMUSCULAR; INTRAVENOUS at 12:13

## 2024-05-06 RX ADMIN — DEXAMETHASONE SODIUM PHOSPHATE 4 MG: 4 INJECTION, SOLUTION INTRAMUSCULAR; INTRAVENOUS at 10:41

## 2024-05-06 RX ADMIN — HEPARIN SODIUM 13 UNITS/KG/HR: 10000 INJECTION, SOLUTION INTRAVENOUS at 07:08

## 2024-05-06 RX ADMIN — METOPROLOL TARTRATE 50 MG: 50 TABLET, FILM COATED ORAL at 18:15

## 2024-05-06 RX ADMIN — LOSARTAN POTASSIUM 100 MG: 50 TABLET, FILM COATED ORAL at 08:28

## 2024-05-06 RX ADMIN — PHENYLEPHRINE HYDROCHLORIDE 200 MCG: 10 INJECTION INTRAVENOUS at 11:06

## 2024-05-06 RX ADMIN — METOPROLOL TARTRATE 50 MG: 50 TABLET, FILM COATED ORAL at 08:28

## 2024-05-06 RX ADMIN — DEXMEDETOMIDINE HYDROCHLORIDE 6 MCG: 100 INJECTION, SOLUTION INTRAVENOUS at 10:41

## 2024-05-06 RX ADMIN — PREGABALIN 75 MG: 75 CAPSULE ORAL at 20:23

## 2024-05-06 RX ADMIN — PROPOFOL 120 MCG/KG/MIN: 10 INJECTION, EMULSION INTRAVENOUS at 10:41

## 2024-05-06 ASSESSMENT — PAIN SCALES - GENERAL
PAINLEVEL_OUTOF10: 0
PAINLEVEL_OUTOF10: 4
PAINLEVEL_OUTOF10: 7
PAINLEVEL_OUTOF10: 0
PAINLEVEL_OUTOF10: 0

## 2024-05-06 ASSESSMENT — PAIN DESCRIPTION - DESCRIPTORS
DESCRIPTORS: TINGLING
DESCRIPTORS: ACHING
DESCRIPTORS: TINGLING

## 2024-05-06 ASSESSMENT — PAIN - FUNCTIONAL ASSESSMENT
PAIN_FUNCTIONAL_ASSESSMENT: ACTIVITIES ARE NOT PREVENTED

## 2024-05-06 ASSESSMENT — PAIN DESCRIPTION - ORIENTATION
ORIENTATION: RIGHT

## 2024-05-06 ASSESSMENT — PAIN DESCRIPTION - LOCATION
LOCATION: FOOT

## 2024-05-06 NOTE — ANESTHESIA PRE PROCEDURE
Department of Anesthesiology  Preprocedure Note       Name:  Jorden Artis   Age:  64 y.o.  :  1960                                          MRN:  [id number]         Date:  2024      Surgeon: Surgeon(s):  Ayad Sesay MD    Procedure: Procedure(s):  THROMBECTOMY REVISION OF RIGHT LEG BYPASS GRAFT    Medications prior to admission:   Prior to Admission medications    Medication Sig Start Date End Date Taking? Authorizing Provider   atorvastatin (LIPITOR) 80 MG tablet Take 1 tablet by mouth daily 24   Neeta Park MD   furosemide (LASIX) 20 MG tablet TAKE 1 TABLET BY MOUTH DAILY 24   Neeta Park MD   pregabalin (LYRICA) 75 MG capsule Take 1 capsule by mouth 3 times daily. 23   Jeromy Dudley MD   losartan (COZAAR) 100 MG tablet TAKE 1 TABLET ONCE DAILY 23   Neeta Park MD   metoprolol tartrate (LOPRESSOR) 50 MG tablet TAKE 1 TABLET BY MOUTH TWO TIMES A DAY. 23   Neeta Park MD   Multiple Vitamin (ONE-A-DAY 55 PLUS PO) Take 1 tablet by mouth daily    ProviderJeromy MD   MATZIM  MG TB24 extended release tablet Take 1 tablet by mouth every morning (before breakfast) 22   ProviderJeromy MD   apixaban (ELIQUIS) 5 MG TABS tablet Take 1 tablet by mouth 2 times daily 3/16/22   Automatic Reconciliation, Ar   aspirin 81 MG EC tablet Take 1 tablet by mouth daily 4/10/20   Automatic Reconciliation, Ar   cilostazol (PLETAL) 50 MG tablet TAKE 1 TABLET BY MOUTH 2 TIMES PER DAY 1/2 HOUR BEFORE OR 2 HOURS AFTER BREAKFAST AND DINNER 3/13/22   Automatic Reconciliation, Ar   dilTIAZem (TIAZAC) 120 MG extended release capsule Take 1 capsule by mouth nightly 21   Automatic Reconciliation, Ar       Current medications:    Current Facility-Administered Medications   Medication Dose Route Frequency Provider Last Rate Last Admin   • potassium chloride (KLOR-CON M) extended release tablet 40 mEq  40 mEq Oral PRN Rashid Valentin MD

## 2024-05-06 NOTE — PROGRESS NOTES
12:35 - received patient from OR with nursing and anesthesia present at bedside.  Patient awake and alert, no complaints of pain, connected to bedside monitor.  Toro catheter patent draining clear yellow urine.  Pulses present by doppler, RLE remains red, anterior surface warm, but bottom of foot remains cool.  14:00 - heparin drip restarted per order.  Patient remains pain free.  Pulses remain audible via doppler.  14:30 - Dr. Mccormack notified of patients low urine output since surgery.  Orders for Bolus and .45 NS infusion at 80 cc/hr obtained.  15:45 - patient urine output low but patient feels need to void.  Deflated balloon and attempted to reposition catheter.  No increased urine flow.  Bladder scanned patient for 0.  Catheter removed and patient able to void small amount.  Will continue to monitor output.  18:00 - 17:55 patient had 2 10 beat runs of V tach.  Dr. Mccormack notified.  Labs and cardiology consult ordered.  18:05 - spoke to Marium Berrios PA-C, covering for Dr. Valenzuela and made her aware of the above.  Also notified of A fib with rate to 150's.  2100 lopressor given early to assist with rate control.  Awaiting labs.  18:20 - patient bladder scanned for 71 cc.  19:00 - report given to Adry COHEN.

## 2024-05-06 NOTE — CONSULTS
artery using 8 mm external ringed Propaten graft; Left groin wound exploration and washout (7/4/2017 - Dr. Estuardo Fulton)    OTHER SURGICAL HISTORY Left 07/01/2017    S/P Left groin exploration; Left femoral repair pseudoaneurysm; Left wound washout; Wound VAC placement (7/01/2017 - Dr. Estuardo Fulton)    OTHER SURGICAL HISTORY Right 06/27/2017    S/P Drainage of right side abscess (6/27/2017 - Dr. Estuardo Fulton)    OTHER SURGICAL HISTORY Left 07/25/2017    S/P Removal of infected graft; Repair of left superficial femoral artery; Repair of left common femoral artery (7/25/2017 - Dr. Estuardo Fulton)     Current Facility-Administered Medications   Medication Dose Route Frequency Provider Last Rate Last Admin    potassium chloride (KLOR-CON M) extended release tablet 40 mEq  40 mEq Oral PRN Rashid Valentin MD   40 mEq at 05/06/24 0256    Or    potassium bicarb-citric acid (EFFER-K) effervescent tablet 40 mEq  40 mEq Oral PRN Rashid Valentin MD        Or    potassium chloride 10 mEq/100 mL IVPB (Peripheral Line)  10 mEq IntraVENous PRN Rashid Valentin MD        Heparin (Porcine) 1000-0.9 UT/500ML-% infusion             lidocaine 1 % injection             heparin (porcine) injection 4,000 Units  4,000 Units IntraVENous PRN Mario Morse Jr., DO   4,000 Units at 05/05/24 1743    heparin (porcine) injection 2,000 Units  2,000 Units IntraVENous PRN Mario Morse Jr., DO        heparin 25,000 units in dextrose 5% 250 mL (premix) infusion  5-30 Units/kg/hr IntraVENous Continuous Mario Morse Jr., DO 11.2 mL/hr at 05/06/24 0708 13 Units/kg/hr at 05/06/24 0708    atorvastatin (LIPITOR) tablet 80 mg  80 mg Oral Daily Faye Abrams, DO   80 mg at 05/06/24 0829    cilostazol (PLETAL) tablet 50 mg  50 mg Oral BID AC Faye Abrams, DO   50 mg at 05/06/24 0652    furosemide (LASIX) tablet 20 mg  20 mg Oral Daily Faye Abrams, DO   20 mg at 05/06/24 0828    losartan (COZAAR)  Session: 0 min   Stress: Not on file   Social Connections: Socially Isolated (3/22/2023)    Social Connection and Isolation Panel [NHANES]     Frequency of Communication with Friends and Family: Never     Frequency of Social Gatherings with Friends and Family: Never     Attends Hindu Services: Never     Active Member of Clubs or Organizations: No     Attends Club or Organization Meetings: Never     Marital Status:    Intimate Partner Violence: Not At Risk (3/22/2023)    Humiliation, Afraid, Rape, and Kick questionnaire     Fear of Current or Ex-Partner: No     Emotionally Abused: No     Physically Abused: No     Sexually Abused: No   Housing Stability: Unknown (3/25/2024)    Housing Stability Vital Sign     Unable to Pay for Housing in the Last Year: Not on file     Number of Places Lived in the Last Year: Not on file     Unstable Housing in the Last Year: No      Family History   Problem Relation Age of Onset    Heart Disease Father        Review of Systems    A full review of systems was completed times ten organ systems and was deemed negative unless otherwise mentioned in the HPI.     Physical Exam:    /85   Pulse (!) 108   Temp 98.6 °F (37 °C) (Oral)   Resp 16   Ht 1.778 m (5' 10\")   Wt 86.2 kg (190 lb)   SpO2 98%   BMI 27.26 kg/m²    Alert and comfortable  Head is normocephalic  Neck no JVD  Chest is clear  Cardiac is regular  Abdomen soft and nontender  His foot is nonpalpable pulses the bypass graft is nonpalpable pulses on the right  Left above-knee amputation  Reviewed the CTA and its imagery    Impression and Plan:    Occluded bypass graft right leg, femoral to anterior tibial cadaver vein  Will cut down and do thrombectomy of the graft today, suspect outflow is the source for thrombosis.  Will perform angiography with angioplasty as needed.  Hopefully his conduit can handle thrombectomy and revision.    Electronically signed by Ayad Mccormack MD on 5/6/2024 at 10:08 AM

## 2024-05-06 NOTE — OP NOTE
Operative Note      Patient: Jorden Artis  YOB: 1960  MRN: [id number]    Date of Procedure: 5/6/2024    Pre-Op Diagnosis Codes:     * Occlusion of right femoropopliteal bypass graft, initial encounter ([facility]) [T82.898A]    Post-Op Diagnosis:  Acute occlusion of right femoral to tibial bypass       Procedure: Thrombectomy of right leg femoral to tibial bypass.  Right leg angiogram with interpretation.  Balloon angioplasty of right femoral portion of the axillofemoral graft to fem-tib bypass graft at the groin with angiogram and radiographic interpretation.  Balloon angioplasty of the anterior tibial artery with associated up interpretation    Surgeon(s):  Ayad Sesay MD    Assistant:   Surgical Assistant: Yelena Rodrigues    Anesthesia: General    Estimated Blood Loss (mL): less than 100     Complications: None    Specimens:   * No specimens in log *    Implants:  * No implants in log *      Drains:   Urinary Catheter 05/06/24 2 Way (Active)       Findings:  Infection Present At Time Of Surgery (PATOS) (choose all levels that have infection present):  No infection present  Other Findings: 80% narrowing at the origin of the bypass graft coming off the distal axillary femoral graft.  The common femoral is patent the profunda femoris is patent the circumflex vessels retrograde fashion filling and patent.  The bypass graft asked them is nicely patent.  After thrombectomy the femoral to tibial bypass was patent there was no further thrombus.  Balloon angioplasty of the anterior tibial with a 2.5 x 150 balloon.  Postangioplasty shows a patent bypass graft anastomosed to a patent anterior tib with flow to the foot.    She isDetailed Description of Procedure:   Brought to the OR had moderate sedation.  Groins and legs were prepped draped usual standard fashion.  Using ultrasound to identify the femoral to tibial bypass at the anterior lateral thigh.  Localized I did a direct cutdown and correctly  identified this cadaver vein bypass.  Placed a vessel loop proximal and distal.  Gave 7000 of heparin.  Made a vertical incision on the bypass graft and first thrombectomized the inflow achieving a nice pulse.  I placed a 6 Bangladeshi sheath and performed an angiogram there was a 80% narrowing at the graft anastomosis to the access femoral graft.  Balloon angioplasty was with the 4 x 40 balloon to full profile.  After this interested there is no residual stenosis I was pleased with the angiogram showing the patent distal axillary femoral bypass going to a patent fem-tib bypass also going to the common femoral profunda the circumflex vessels are all  Thrombectomized the lower portion of the bypass graft again fresh thrombus was seen.  Patent.  Placed a sheath and wire and through the bypass graft to the anterior tibial balloon angioplastied the anterior tibial with a 2.5 x 150 balloon for 3-minute hold time.  Postangioplasty performed an angiogram from the access site and there is contrast going from the bypass into the anterior tibial collaterals and flow into the foot.  I was pleased with this I pulled the sheath of her flushing and repaired the site with interrupted 7-0 Prolene sutures.  Had good hemostasis to call the controls off there was a nice Doppler signal in the bypass graft irrigated and closed the fascia with interrupted 3-0 Monocryl for Monocryl and Dermabond glue for the skin.  He was transferred to recovery in stable condition.    Electronically signed by Ayad Mccormack MD on 5/6/2024 at 12:49 PM

## 2024-05-06 NOTE — PROGRESS NOTES
Advance Care Planning   Healthcare Decision Maker:    Primary Decision Maker: Isis Artis - Child - [phone]    Today we documented Decision Maker(s) consistent with ACP documents on file.      conducted an initial consultation and Spiritual Assessment for Jorden Artis, who is a 64 y.o.,male. Patient's Primary Language is: English.   According to the patient's EMR Zoroastrian Affiliation is: Other.     The reason the Patient came to the hospital is:   Patient Active Problem List    Diagnosis Date Noted    Acute embolism and thrombosis of deep vein of right distal leg ([facility]) 05/06/2024    Arterial occlusion 05/05/2024    IFG (impaired fasting glucose) 03/25/2024    Amputation of toe of right foot ([facility]) 03/25/2024    Atrial fibrillation ([facility])     Occlusion of right femoral-popliteal bypass graft ([facility]) 02/28/2022    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[facility]) 03/31/2021    Skin ulcer of malleolar area of right ankle ([facility])     Ischemic cardiomyopathy     Chronic systolic heart failure ([facility])     Adverse effect of amiodarone 08/19/2017    Prolonged Q-T interval on ECG 08/19/2017    Status post above-knee amputation of left lower extremity ([facility]) 08/18/2017    Ischemic rest pain of lower extremity 08/14/2017    Chronic ulcer of right heel ([facility]) 08/08/2017    Chronic anemia     Acute blood loss as cause of postoperative anemia 07/25/2017    Impaired mobility and ADLs 07/24/2017    Moderate to severe pulmonary hypertension ([facility]) 07/23/2017    Pseudoaneurysm of femoral artery ([facility]) 06/27/2017    Status post femoral-popliteal bypass surgery 04/21/2017    Dyslipidemia     Carotid artery disease ([facility])     History of cardioversion 04/18/2017    Coronary artery disease involving native coronary artery of native heart without angina pectoris 03/15/2017    Atherosclerosis of native artery of both lower extremities with intermittent claudication ([facility]) 01/25/2017    Aortoiliac occlusive disease ([facility]) 01/25/2017

## 2024-05-06 NOTE — ANESTHESIA POSTPROCEDURE EVALUATION
Department of Anesthesiology  Postprocedure Note    Patient: Jorden Artis  MRN: [id number]  YOB: 1960  Date of evaluation: 5/6/2024    Procedure Summary       Date: 05/06/24 Room / Location: [facility] 02 / [facility] CARDIAC SURGERY    Anesthesia Start: 1037 Anesthesia Stop: 1237    Procedure: THROMBECTOMY REVISION OF RIGHT LEG BYPASS GRAFT (Right: Leg Upper) Diagnosis:       Occlusion of right femoropopliteal bypass graft, initial encounter ([facility])      (Occlusion of right femoropopliteal bypass graft, initial encounter ([facility]) [T82.898A])    Surgeons: Ayad Sesay MD Responsible Provider: Houston Pérez Jr., MD    Anesthesia Type: MAC ASA Status: 4            Anesthesia Type: MAC    Estefania Phase I:      Estefania Phase II:      Anesthesia Post Evaluation    Patient location during evaluation: bedside  Airway patency: patent  Cardiovascular status: hemodynamically stable  Respiratory status: acceptable  Hydration status: stable  Pain management: adequate    No notable events documented.

## 2024-05-06 NOTE — PROGRESS NOTES
Progress Note  Hospitalist Service    Patient: Jorden Artis MRN: [id number]   SSN: [id number]  YOB: 1960   Age: 64 y.o.  Sex: male      Admit Date: 5/5/2024    LOS: 1 day   Chief Complaint   Patient presents with    Circulatory Problem       Subjective:     Patient seen following procedure.  Continues to be an absolute pleasure to care for.  No pain. Pulses palpated. Leg is warm.      Objective:     Vitals:  BP (!) 148/106   Pulse (!) 132   Temp 97.4 °F (36.3 °C) (Oral)   Resp 20   Ht 1.778 m (5' 10\")   Wt 86.2 kg (190 lb)   SpO2 98%   BMI 27.26 kg/m²     Physical Exam:       General:  Alert, cooperative, no distress, appears stated age.              Head: Normocephalic, without obvious abnormality, atraumatic.   Eyes:  Conjunctivae/corneas clear. PERRL, EOMs intact.   Nose: Nares normal. No drainage or sinus tenderness.   Neck: Supple, symmetrical, trachea midline, no adenopathy, thyroid: no enlargement, no carotid bruit and no JVD.   Lungs:   Clear to auscultation bilaterally.   Heart:  Irregularly irregular      Abdomen: Soft, non-tender. Bowel sounds normal.    Extremities:  Left AKA. Right leg warm; able to move toes. No swelling.          Skin:  No rashes or lesions   Neurologic: AAOx3, No focal motor or sensory deficit.        Intake and Output:  Current Shift: 05/06 0701 - 05/06 1900  In: 2152.4 [P.O.:1020; I.V.:1132.4]  Out: 215 [Urine:215]  Last three shifts: 05/04 1901 - 05/06 0700  In: 900.5 [P.O.:810; I.V.:90.5]  Out: 1350 [Urine:1350]    Lab/Data Review:  Recent Results (from the past 12 hour(s))   APTT    Collection Time: 05/06/24  1:54 PM   Result Value Ref Range    APTT 152.5 (HH) 23.0 - 36.4 SEC   Potassium    Collection Time: 05/06/24  1:54 PM   Result Value Ref Range    Potassium 4.0 3.5 - 5.5 mmol/L         Key Findings or tests:       Telemetry NONE   Oxygen NONE     Assessment and Plan:     Occlusion of right femoropopliteal bypass graft.   #1. Admitted to CVT

## 2024-05-06 NOTE — PROGRESS NOTES
1910-Bedside and Verbal shift change report given to Adry Gifford RN  (oncoming nurse) by Astrid Sadler RN  (offgoing nurse). Report included the following information Nurse Handoff Report, Surgery Report, Intake/Output, MAR, Recent Results, Cardiac Rhythm Afib , and Neuro Assessment.   Assumed care of pt AO X 4 ,on oxygen 2L via nasal canula.No bleeding or hematoma on the surgical site.Pt is in Afib,started on Diltiazem drip as per the order.Wound Prevention Checklist    Patient: Jorden Artis (64 y.o. male)  Date: 5/7/2024  Diagnosis: Arterial occlusion [I70.90]  PAD (peripheral artery disease) ([facility]) [I73.9]  Atrial fibrillation with rapid ventricular response ([facility]) [I48.91]  Acute embolism and thrombosis of deep vein of right distal leg ([facility]) [I82.4Z1] Arterial occlusion    Precautions:         []  Heel prevention boots placed on patient    [x]  Patient turned q2h during shift    []  Lift team ordered    []  Patient on May bed/Specialty bed    [x]  Each Wound is documented during shift (Stage, Color, drainage, odor, measurements, and dressings)    [x]  Dual skin check done with Astrid Gifford, RN   2909-Bedside shift Report given to Astrid COHEN

## 2024-05-07 LAB
ANION GAP SERPL CALC-SCNC: 4 MMOL/L (ref 3–18)
APTT PPP: 131.2 SEC (ref 23–36.4)
APTT PPP: 50.4 SEC (ref 23–36.4)
BUN SERPL-MCNC: 12 MG/DL (ref 7–18)
BUN/CREAT SERPL: 13 (ref 12–20)
CALCIUM SERPL-MCNC: 7.9 MG/DL (ref 8.5–10.1)
CHLORIDE SERPL-SCNC: 108 MMOL/L (ref 100–111)
CO2 SERPL-SCNC: 22 MMOL/L (ref 21–32)
CREAT SERPL-MCNC: 0.96 MG/DL (ref 0.6–1.3)
ERYTHROCYTE [DISTWIDTH] IN BLOOD BY AUTOMATED COUNT: 16.2 % (ref 11.6–14.5)
GLUCOSE SERPL-MCNC: 124 MG/DL (ref 74–99)
HCT VFR BLD AUTO: 37.4 % (ref 36–48)
HGB BLD-MCNC: 12.8 G/DL (ref 13–16)
MAGNESIUM SERPL-MCNC: 2.3 MG/DL (ref 1.6–2.6)
MCH RBC QN AUTO: 30.5 PG (ref 24–34)
MCHC RBC AUTO-ENTMCNC: 34.2 G/DL (ref 31–37)
MCV RBC AUTO: 89.3 FL (ref 78–100)
NRBC # BLD: 0 K/UL (ref 0–0.01)
NRBC BLD-RTO: 0 PER 100 WBC
PLATELET # BLD AUTO: 301 K/UL (ref 135–420)
PMV BLD AUTO: 9.2 FL (ref 9.2–11.8)
POTASSIUM SERPL-SCNC: 3.8 MMOL/L (ref 3.5–5.5)
RBC # BLD AUTO: 4.19 M/UL (ref 4.35–5.65)
SODIUM SERPL-SCNC: 134 MMOL/L (ref 136–145)
WBC # BLD AUTO: 13.8 K/UL (ref 4.6–13.2)

## 2024-05-07 PROCEDURE — 6370000000 HC RX 637 (ALT 250 FOR IP): Performed by: STUDENT IN AN ORGANIZED HEALTH CARE EDUCATION/TRAINING PROGRAM

## 2024-05-07 PROCEDURE — 36415 COLL VENOUS BLD VENIPUNCTURE: CPT

## 2024-05-07 PROCEDURE — 80048 BASIC METABOLIC PNL TOTAL CA: CPT

## 2024-05-07 PROCEDURE — 99222 1ST HOSP IP/OBS MODERATE 55: CPT | Performed by: INTERNAL MEDICINE

## 2024-05-07 PROCEDURE — 85027 COMPLETE CBC AUTOMATED: CPT

## 2024-05-07 PROCEDURE — 6370000000 HC RX 637 (ALT 250 FOR IP)

## 2024-05-07 PROCEDURE — 6360000002 HC RX W HCPCS: Performed by: SURGERY

## 2024-05-07 PROCEDURE — 6370000000 HC RX 637 (ALT 250 FOR IP): Performed by: SURGERY

## 2024-05-07 PROCEDURE — 99232 SBSQ HOSP IP/OBS MODERATE 35: CPT | Performed by: STUDENT IN AN ORGANIZED HEALTH CARE EDUCATION/TRAINING PROGRAM

## 2024-05-07 PROCEDURE — 51798 US URINE CAPACITY MEASURE: CPT

## 2024-05-07 PROCEDURE — 83735 ASSAY OF MAGNESIUM: CPT

## 2024-05-07 PROCEDURE — 2100000000 HC CCU R&B

## 2024-05-07 PROCEDURE — 6360000002 HC RX W HCPCS: Performed by: STUDENT IN AN ORGANIZED HEALTH CARE EDUCATION/TRAINING PROGRAM

## 2024-05-07 PROCEDURE — 85730 THROMBOPLASTIN TIME PARTIAL: CPT

## 2024-05-07 PROCEDURE — 2500000003 HC RX 250 WO HCPCS: Performed by: STUDENT IN AN ORGANIZED HEALTH CARE EDUCATION/TRAINING PROGRAM

## 2024-05-07 PROCEDURE — 94761 N-INVAS EAR/PLS OXIMETRY MLT: CPT

## 2024-05-07 RX ORDER — DILTIAZEM HYDROCHLORIDE 180 MG/1
180 CAPSULE, COATED, EXTENDED RELEASE ORAL DAILY
Status: DISCONTINUED | OUTPATIENT
Start: 2024-05-07 | End: 2024-05-09 | Stop reason: HOSPADM

## 2024-05-07 RX ORDER — DILTIAZEM HYDROCHLORIDE 120 MG/1
120 CAPSULE, COATED, EXTENDED RELEASE ORAL DAILY
Status: DISCONTINUED | OUTPATIENT
Start: 2024-05-07 | End: 2024-05-07

## 2024-05-07 RX ORDER — ASPIRIN 81 MG/1
81 TABLET, CHEWABLE ORAL DAILY
Status: DISCONTINUED | OUTPATIENT
Start: 2024-05-07 | End: 2024-05-09 | Stop reason: HOSPADM

## 2024-05-07 RX ORDER — DILTIAZEM HYDROCHLORIDE 120 MG/1
120 CAPSULE, COATED, EXTENDED RELEASE ORAL NIGHTLY
Status: DISCONTINUED | OUTPATIENT
Start: 2024-05-07 | End: 2024-05-09 | Stop reason: HOSPADM

## 2024-05-07 RX ORDER — CALCIUM GLUCONATE 20 MG/ML
1000 INJECTION, SOLUTION INTRAVENOUS
Status: COMPLETED | OUTPATIENT
Start: 2024-05-07 | End: 2024-05-07

## 2024-05-07 RX ORDER — CALCIUM GLUCONATE 94 MG/ML
1000 INJECTION, SOLUTION INTRAVENOUS ONCE
Status: DISCONTINUED | OUTPATIENT
Start: 2024-05-07 | End: 2024-05-07 | Stop reason: SDUPTHER

## 2024-05-07 RX ADMIN — ASPIRIN 81 MG CHEWABLE TABLET 81 MG: 81 TABLET CHEWABLE at 14:20

## 2024-05-07 RX ADMIN — CILOSTAZOL 50 MG: 50 TABLET ORAL at 16:48

## 2024-05-07 RX ADMIN — PREGABALIN 75 MG: 75 CAPSULE ORAL at 08:49

## 2024-05-07 RX ADMIN — CALCIUM GLUCONATE 1000 MG: 20 INJECTION, SOLUTION INTRAVENOUS at 11:04

## 2024-05-07 RX ADMIN — PREGABALIN 75 MG: 75 CAPSULE ORAL at 20:36

## 2024-05-07 RX ADMIN — DILTIAZEM HYDROCHLORIDE 120 MG: 120 CAPSULE, COATED, EXTENDED RELEASE ORAL at 20:37

## 2024-05-07 RX ADMIN — HEPARIN SODIUM 4000 UNITS: 1000 INJECTION INTRAVENOUS; SUBCUTANEOUS at 17:09

## 2024-05-07 RX ADMIN — FUROSEMIDE 20 MG: 20 TABLET ORAL at 08:50

## 2024-05-07 RX ADMIN — PREGABALIN 75 MG: 75 CAPSULE ORAL at 14:20

## 2024-05-07 RX ADMIN — METOPROLOL TARTRATE 50 MG: 50 TABLET, FILM COATED ORAL at 08:50

## 2024-05-07 RX ADMIN — METOPROLOL TARTRATE 50 MG: 50 TABLET, FILM COATED ORAL at 20:37

## 2024-05-07 RX ADMIN — LOSARTAN POTASSIUM 100 MG: 50 TABLET, FILM COATED ORAL at 08:50

## 2024-05-07 RX ADMIN — CILOSTAZOL 50 MG: 50 TABLET ORAL at 08:49

## 2024-05-07 RX ADMIN — HYDROCODONE BITARTRATE AND ACETAMINOPHEN 1 TABLET: 5; 325 TABLET ORAL at 19:52

## 2024-05-07 RX ADMIN — DILTIAZEM HYDROCHLORIDE 180 MG: 180 CAPSULE, COATED, EXTENDED RELEASE ORAL at 10:14

## 2024-05-07 RX ADMIN — ATORVASTATIN CALCIUM 80 MG: 40 TABLET, FILM COATED ORAL at 08:49

## 2024-05-07 RX ADMIN — HEPARIN SODIUM 12 UNITS/KG/HR: 10000 INJECTION, SOLUTION INTRAVENOUS at 11:47

## 2024-05-07 ASSESSMENT — PAIN SCALES - WONG BAKER: WONGBAKER_NUMERICALRESPONSE: NO HURT

## 2024-05-07 ASSESSMENT — PAIN DESCRIPTION - ONSET
ONSET: ON-GOING
ONSET: ON-GOING

## 2024-05-07 ASSESSMENT — PAIN SCALES - GENERAL
PAINLEVEL_OUTOF10: 0
PAINLEVEL_OUTOF10: 2
PAINLEVEL_OUTOF10: 2
PAINLEVEL_OUTOF10: 4

## 2024-05-07 ASSESSMENT — PAIN DESCRIPTION - DESCRIPTORS
DESCRIPTORS: TINGLING
DESCRIPTORS: TINGLING
DESCRIPTORS: ACHING

## 2024-05-07 ASSESSMENT — PAIN DESCRIPTION - FREQUENCY
FREQUENCY: INTERMITTENT
FREQUENCY: INTERMITTENT

## 2024-05-07 ASSESSMENT — PAIN DESCRIPTION - ORIENTATION
ORIENTATION: RIGHT

## 2024-05-07 ASSESSMENT — PAIN - FUNCTIONAL ASSESSMENT
PAIN_FUNCTIONAL_ASSESSMENT: ACTIVITIES ARE NOT PREVENTED

## 2024-05-07 ASSESSMENT — PAIN DESCRIPTION - LOCATION
LOCATION: FOOT

## 2024-05-07 ASSESSMENT — PAIN DESCRIPTION - PAIN TYPE
TYPE: CHRONIC PAIN;SURGICAL PAIN
TYPE: CHRONIC PAIN;SURGICAL PAIN

## 2024-05-07 NOTE — CONSULTS
[facility] - Consult Note    Cardiology consultation request from Dr. Abrams for evaluation and management/treatment of A. Fib RVR and NSVT    Date of  Admission: 5/5/2024  7:08 AM   Primary Care Physician:  Neeta Park MD    Attending Cardiologist: Dr. Garg      Seen and independently examined.  Agree with below.  Patient underwent urgent vascular surgery yesterday for an occluded bypass graft.  Postoperatively, he developed rapid atrial fibrillation.  Telemetry reviewed.  Patient with longstanding persistent atrial fibrillation for many years now.  He did develop rapid rates postoperatively which is not surprising.  His rates are easily controlled with IV diltiazem.  He was previously taking metoprolol to tartrate and long-acting diltiazem for rate control in the outpatient setting.  He did have several short runs of nonsustained VT which were asymptomatic.  Longest run lasted for 10 minutes.  Would resume outpatient metoprolol and oral diltiazem dosages and try and wean off the IV diltiazem by the end of the day today.  Resuming aspirin, continuing IV heparin with plan to transition back to Eliquis once safe from a surgical standpoint.  He remains asymptomatic from a cardiac standpoint.  No anginal complaints, no signs or symptoms of heart failure.  Would also recommend repeat an echocardiogram since it has been at least 2 years since his prior study.    Estuardo Garg MD     Assessment:     Patient Active Problem List    Diagnosis Date Noted    Acute embolism and thrombosis of deep vein of right distal leg ([facility]) 05/06/2024    Arterial occlusion 05/05/2024    IFG (impaired fasting glucose) 03/25/2024    Amputation of toe of right foot ([facility]) 03/25/2024    Atrial fibrillation ([facility])     Occlusion of right femoral-popliteal bypass graft ([facility]) 02/28/2022    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[facility]) 03/31/2021    Skin ulcer of malleolar area of right ankle ([facility])     Ischemic cardiomyopathy      by David Dempsey MD ([id number]) on 5/5/2024 11:06:54 AM       No results found for this or any previous visit.    05/23/23    NM STRESS TEST WITH MYOCARDIAL PERFUSION 06/02/2023  5:06 PM (Final)    Interpretation Summary    Stress Combined Conclusion: The study is negative for myocardial ischemia. Findings suggest a low risk of myocardial ischemia.    Perfusion Comments: LV perfusion is probably normal.    LVEF calculation likely inaccurate due to gating issues due to rapid atrial fibrillation.    ECG: Resting ECG demonstrates atrial fibrillation.    ECG: The ECG was not diagnostic due to resting ST-T abnormalities.    Stress Test: A pharmacological stress test was performed using lexiscan. Hemodynamics are suboptimal due to medication. Blood pressure demonstrated a normal response and heart rate demonstrated a normal response to stress. The patient's heart rate recovery was normal.    Signed by: Estuardo Garg MD on 6/2/2023  5:06 PM    No results found for this or any previous visit.    Xray Result (most recent):  XR CHEST PORTABLE 05/05/2024    Narrative  HISTORY: preop    TECHNIQUE: XR CHEST PORTABLE    COMPARISON: Chest x-ray 12/15/2021.    FINDINGS:  Heart/Mediastinum:  Normal heart size.  Unremarkable mediastinum.    Lungs:  No acute pulmonary consolidation.  No sizeable pleural effusion.  No  pneumothorax.  Few surgical clips in the RIGHT upper lung.    Osseous Structures:  No acute abnormalities.    Additional Comments: None.    Impression  No acute pulmonary consolidation, pneumothorax or sizable pleural effusion. No  significant interval changes.      Signed By: Bita Holt, APRN - NP     May 7, 2024

## 2024-05-07 NOTE — PLAN OF CARE
Problem: Skin/Tissue Integrity  Goal: Absence of new skin breakdown  Description: 1.  Monitor for areas of redness and/or skin breakdown  2.  Assess vascular access sites hourly  3.  Every 4-6 hours minimum:  Change oxygen saturation probe site  4.  Every 4-6 hours:  If on nasal continuous positive airway pressure, respiratory therapy assess nares and determine need for appliance change or resting period.  Outcome: Progressing     Problem: Pain  Goal: Verbalizes/displays adequate comfort level or baseline comfort level  Outcome: Progressing     Problem: Cardiovascular - Adult  Goal: Maintains optimal cardiac output and hemodynamic stability  Outcome: Progressing     Problem: Musculoskeletal - Adult  Goal: Return mobility to safest level of function  Outcome: Progressing

## 2024-05-07 NOTE — PROGRESS NOTES
Progress Note  Hospitalist Service    Patient: Jorden Artis MRN: [id number]   SSN: [id number]  YOB: 1960   Age: 64 y.o.  Sex: male      Admit Date: 5/5/2024    LOS: 2 days   Chief Complaint   Patient presents with    Circulatory Problem       Subjective:     Patient seen following procedure.  Continues to be an absolute pleasure to care for.  No pain. Pulses palpated. Leg is warm.    To have repeat angio tomorrow.     Objective:     Vitals:  /64   Pulse 96   Temp 98.1 °F (36.7 °C) (Oral)   Resp 17   Ht 1.778 m (5' 10\")   Wt 86.2 kg (190 lb)   SpO2 94%   BMI 27.26 kg/m²     Physical Exam:       General:  Alert, cooperative, no distress, appears stated age.              Head: Normocephalic, without obvious abnormality, atraumatic.   Eyes:  Conjunctivae/corneas clear. PERRL, EOMs intact.   Nose: Nares normal. No drainage or sinus tenderness.   Neck: Supple, symmetrical, trachea midline, no adenopathy, thyroid: no enlargement, no carotid bruit and no JVD.   Lungs:   Clear to auscultation bilaterally.   Heart:  Irregularly irregular      Abdomen: Soft, non-tender. Bowel sounds normal.    Extremities:  Left AKA. Right leg warm; able to move toes. No swelling.          Skin:  No rashes or lesions   Neurologic: AAOx3, No focal motor or sensory deficit.        Intake and Output:  Current Shift: 05/07 0701 - 05/07 1900  In: 333.7 [P.O.:240; I.V.:93.7]  Out: 400 [Urine:400]  Last three shifts: 05/05 1901 - 05/07 0700  In: 3943.1 [P.O.:1510; I.V.:2433.1]  Out: 2260 [Urine:2260]    Lab/Data Review:  Recent Results (from the past 12 hour(s))   APTT    Collection Time: 05/07/24  5:40 AM   Result Value Ref Range    APTT 131.2 (H) 23.0 - 36.4 SEC   CBC    Collection Time: 05/07/24  5:40 AM   Result Value Ref Range    WBC 13.8 (H) 4.6 - 13.2 K/uL    RBC 4.19 (L) 4.35 - 5.65 M/uL    Hemoglobin 12.8 (L) 13.0 - 16.0 g/dL    Hematocrit 37.4 36.0 - 48.0 %    MCV 89.3 78.0 - 100.0 FL    MCH 30.5 24.0 -

## 2024-05-07 NOTE — PROGRESS NOTES
Alert and comfortable  Says his ischemic pain is resolved  Incision on the right is intact  I can palpate the pulse on his bypass graft.  Doppler signals are present in the foot  Foot is warm now  Cardiology to review his atrial fibrillation now with runs of V-fib overnight  May need new echo, defer to cardiology  Would like to do a follow-up angiogram to optimize tibial flow  This can be done once patient is most optimized.  Patient okay to get out of bed and ambulate

## 2024-05-07 NOTE — PROGRESS NOTES
07:15 - report received and care assumed.  Patient in bed, awake alert and oriented.  No complaints of pain at this time.  08:00 - patient seen by Dr. Sesay in to see patient, pulses checked.  Discussed doing another procedure with patient in the near future.  09:15 - patient seen by cardiology, orders received.  12:00 - patient resting comfortably, pulses checked by doppler to graft site and foot.  RLE remains warm and red.  No complaints of pain.  HR mostly 80-90's with rate elevation to 120 - 130 with exertion.  14:00 - Cardizem drip stopped per order.  14:30 - patient up to commode for bowel movement.  Assisted to recliner chair.  16:00 - patient assisted back to bed.  19:00 - report given to Giselle COHEN.

## 2024-05-08 ENCOUNTER — APPOINTMENT (OUTPATIENT)
Facility: HOSPITAL | Age: 64
End: 2024-05-08
Payer: MEDICARE

## 2024-05-08 LAB
ANION GAP SERPL CALC-SCNC: 6 MMOL/L (ref 3–18)
APTT PPP: 104.4 SEC (ref 23–36.4)
APTT PPP: 119.6 SEC (ref 23–36.4)
APTT PPP: 67.7 SEC (ref 23–36.4)
APTT PPP: 72.1 SEC (ref 23–36.4)
BUN SERPL-MCNC: 13 MG/DL (ref 7–18)
BUN/CREAT SERPL: 13 (ref 12–20)
CALCIUM SERPL-MCNC: 8.3 MG/DL (ref 8.5–10.1)
CHLORIDE SERPL-SCNC: 112 MMOL/L (ref 100–111)
CO2 SERPL-SCNC: 23 MMOL/L (ref 21–32)
CREAT SERPL-MCNC: 0.98 MG/DL (ref 0.6–1.3)
ECHO AO ASC DIAM: 3.7 CM
ECHO AO ASCENDING AORTA INDEX: 1.81 CM/M2
ECHO AO ROOT DIAM: 3.8 CM
ECHO AO ROOT INDEX: 1.86 CM/M2
ECHO AV AREA PEAK VELOCITY: 2.5 CM2
ECHO AV AREA/BSA PEAK VELOCITY: 1.2 CM2/M2
ECHO AV PEAK GRADIENT: 5 MMHG
ECHO AV PEAK VELOCITY: 1.2 M/S
ECHO AV VELOCITY RATIO: 0.75
ECHO BSA: 2.06 M2
ECHO EST RA PRESSURE: 3 MMHG
ECHO LA DIAMETER INDEX: 1.81 CM/M2
ECHO LA DIAMETER: 3.7 CM
ECHO LA TO AORTIC ROOT RATIO: 0.97
ECHO LA VOL A-L A2C: 109 ML (ref 18–58)
ECHO LA VOL A-L A4C: 103 ML (ref 18–58)
ECHO LA VOL BP: 100 ML (ref 18–58)
ECHO LA VOL MOD A2C: 104 ML (ref 18–58)
ECHO LA VOL MOD A4C: 94 ML (ref 18–58)
ECHO LA VOL/BSA BIPLANE: 49 ML/M2 (ref 16–34)
ECHO LA VOLUME AREA LENGTH: 107 ML
ECHO LA VOLUME INDEX A-L A2C: 53 ML/M2 (ref 16–34)
ECHO LA VOLUME INDEX A-L A4C: 50 ML/M2 (ref 16–34)
ECHO LA VOLUME INDEX AREA LENGTH: 52 ML/M2 (ref 16–34)
ECHO LA VOLUME INDEX MOD A2C: 51 ML/M2 (ref 16–34)
ECHO LA VOLUME INDEX MOD A4C: 46 ML/M2 (ref 16–34)
ECHO LV EDV A2C: 97 ML
ECHO LV EDV A4C: 117 ML
ECHO LV EDV BP: 108 ML (ref 67–155)
ECHO LV EDV INDEX A4C: 57 ML/M2
ECHO LV EDV INDEX BP: 53 ML/M2
ECHO LV EDV NDEX A2C: 48 ML/M2
ECHO LV EJECTION FRACTION A2C: 52 %
ECHO LV EJECTION FRACTION A4C: 53 %
ECHO LV EJECTION FRACTION BIPLANE: 52 % (ref 55–100)
ECHO LV ESV A2C: 46 ML
ECHO LV ESV A4C: 56 ML
ECHO LV ESV BP: 51 ML (ref 22–58)
ECHO LV ESV INDEX A2C: 23 ML/M2
ECHO LV ESV INDEX A4C: 27 ML/M2
ECHO LV ESV INDEX BP: 25 ML/M2
ECHO LV FRACTIONAL SHORTENING: 20 % (ref 28–44)
ECHO LV INTERNAL DIMENSION DIASTOLE INDEX: 2.75 CM/M2
ECHO LV INTERNAL DIMENSION DIASTOLIC: 5.6 CM (ref 4.2–5.9)
ECHO LV INTERNAL DIMENSION SYSTOLIC INDEX: 2.21 CM/M2
ECHO LV INTERNAL DIMENSION SYSTOLIC: 4.5 CM
ECHO LV IVSD: 1.1 CM (ref 0.6–1)
ECHO LV MASS 2D: 205.5 G (ref 88–224)
ECHO LV MASS INDEX 2D: 100.7 G/M2 (ref 49–115)
ECHO LV POSTERIOR WALL DIASTOLIC: 0.8 CM (ref 0.6–1)
ECHO LV RELATIVE WALL THICKNESS RATIO: 0.29
ECHO LVOT AREA: 3.1 CM2
ECHO LVOT DIAM: 2 CM
ECHO LVOT PEAK GRADIENT: 3 MMHG
ECHO LVOT PEAK VELOCITY: 0.9 M/S
ECHO PV MAX VELOCITY: 0.5 M/S
ECHO PV PEAK GRADIENT: 1 MMHG
ECHO RA VOLUME BIPLANE METHOD OF DISKS: 186 ML
ECHO RA VOLUME INDEX BP: 91 ML/M2
ECHO RA VOLUME: 186 ML
ECHO RA VOLUME: 196 ML
ECHO RIGHT VENTRICULAR SYSTOLIC PRESSURE (RVSP): 27 MMHG
ECHO RV BASAL DIMENSION: 4.9 CM
ECHO RV TAPSE: 1.4 CM (ref 1.7–?)
ECHO TV REGURGITANT MAX VELOCITY: 2.47 M/S
ECHO TV REGURGITANT PEAK GRADIENT: 24 MMHG
ERYTHROCYTE [DISTWIDTH] IN BLOOD BY AUTOMATED COUNT: 16.6 % (ref 11.6–14.5)
GLUCOSE SERPL-MCNC: 91 MG/DL (ref 74–99)
HCT VFR BLD AUTO: 38 % (ref 36–48)
HGB BLD-MCNC: 12.9 G/DL (ref 13–16)
MCH RBC QN AUTO: 30.6 PG (ref 24–34)
MCHC RBC AUTO-ENTMCNC: 33.9 G/DL (ref 31–37)
MCV RBC AUTO: 90.3 FL (ref 78–100)
NRBC # BLD: 0 K/UL (ref 0–0.01)
NRBC BLD-RTO: 0 PER 100 WBC
PLATELET # BLD AUTO: 338 K/UL (ref 135–420)
PMV BLD AUTO: 9.3 FL (ref 9.2–11.8)
POTASSIUM SERPL-SCNC: 3.6 MMOL/L (ref 3.5–5.5)
RBC # BLD AUTO: 4.21 M/UL (ref 4.35–5.65)
SODIUM SERPL-SCNC: 141 MMOL/L (ref 136–145)
WBC # BLD AUTO: 12 K/UL (ref 4.6–13.2)

## 2024-05-08 PROCEDURE — 6370000000 HC RX 637 (ALT 250 FOR IP): Performed by: SURGERY

## 2024-05-08 PROCEDURE — 85730 THROMBOPLASTIN TIME PARTIAL: CPT

## 2024-05-08 PROCEDURE — 6360000002 HC RX W HCPCS: Performed by: STUDENT IN AN ORGANIZED HEALTH CARE EDUCATION/TRAINING PROGRAM

## 2024-05-08 PROCEDURE — 6360000002 HC RX W HCPCS: Performed by: SURGERY

## 2024-05-08 PROCEDURE — 99232 SBSQ HOSP IP/OBS MODERATE 35: CPT | Performed by: STUDENT IN AN ORGANIZED HEALTH CARE EDUCATION/TRAINING PROGRAM

## 2024-05-08 PROCEDURE — 36415 COLL VENOUS BLD VENIPUNCTURE: CPT

## 2024-05-08 PROCEDURE — 93306 TTE W/DOPPLER COMPLETE: CPT

## 2024-05-08 PROCEDURE — 93306 TTE W/DOPPLER COMPLETE: CPT | Performed by: INTERNAL MEDICINE

## 2024-05-08 PROCEDURE — 80048 BASIC METABOLIC PNL TOTAL CA: CPT

## 2024-05-08 PROCEDURE — 85027 COMPLETE CBC AUTOMATED: CPT

## 2024-05-08 PROCEDURE — 99232 SBSQ HOSP IP/OBS MODERATE 35: CPT | Performed by: INTERNAL MEDICINE

## 2024-05-08 PROCEDURE — 94761 N-INVAS EAR/PLS OXIMETRY MLT: CPT

## 2024-05-08 PROCEDURE — 6370000000 HC RX 637 (ALT 250 FOR IP)

## 2024-05-08 PROCEDURE — 2100000000 HC CCU R&B

## 2024-05-08 PROCEDURE — 6370000000 HC RX 637 (ALT 250 FOR IP): Performed by: STUDENT IN AN ORGANIZED HEALTH CARE EDUCATION/TRAINING PROGRAM

## 2024-05-08 RX ORDER — HEPARIN SODIUM 200 [USP'U]/100ML
INJECTION, SOLUTION INTRAVENOUS CONTINUOUS PRN
Status: DISCONTINUED | OUTPATIENT
Start: 2024-05-06 | End: 2024-05-08 | Stop reason: ALTCHOICE

## 2024-05-08 RX ADMIN — HYDROCODONE BITARTRATE AND ACETAMINOPHEN 1 TABLET: 5; 325 TABLET ORAL at 15:27

## 2024-05-08 RX ADMIN — CILOSTAZOL 50 MG: 50 TABLET ORAL at 15:23

## 2024-05-08 RX ADMIN — PREGABALIN 75 MG: 75 CAPSULE ORAL at 09:17

## 2024-05-08 RX ADMIN — DILTIAZEM HYDROCHLORIDE 180 MG: 180 CAPSULE, COATED, EXTENDED RELEASE ORAL at 09:17

## 2024-05-08 RX ADMIN — METOPROLOL TARTRATE 50 MG: 50 TABLET, FILM COATED ORAL at 20:28

## 2024-05-08 RX ADMIN — DILTIAZEM HYDROCHLORIDE 120 MG: 120 CAPSULE, COATED, EXTENDED RELEASE ORAL at 20:28

## 2024-05-08 RX ADMIN — METOPROLOL TARTRATE 50 MG: 50 TABLET, FILM COATED ORAL at 09:18

## 2024-05-08 RX ADMIN — HEPARIN SODIUM 2000 UNITS: 1000 INJECTION INTRAVENOUS; SUBCUTANEOUS at 16:27

## 2024-05-08 RX ADMIN — PREGABALIN 75 MG: 75 CAPSULE ORAL at 20:38

## 2024-05-08 RX ADMIN — HEPARIN SODIUM 14 UNITS/KG/HR: 10000 INJECTION, SOLUTION INTRAVENOUS at 09:23

## 2024-05-08 RX ADMIN — PREGABALIN 75 MG: 75 CAPSULE ORAL at 15:22

## 2024-05-08 RX ADMIN — FUROSEMIDE 20 MG: 20 TABLET ORAL at 09:21

## 2024-05-08 RX ADMIN — ASPIRIN 81 MG CHEWABLE TABLET 81 MG: 81 TABLET CHEWABLE at 09:21

## 2024-05-08 RX ADMIN — CILOSTAZOL 50 MG: 50 TABLET ORAL at 06:22

## 2024-05-08 RX ADMIN — ATORVASTATIN CALCIUM 80 MG: 40 TABLET, FILM COATED ORAL at 09:21

## 2024-05-08 RX ADMIN — HEPARIN SODIUM 2000 UNITS: 1000 INJECTION INTRAVENOUS; SUBCUTANEOUS at 07:47

## 2024-05-08 RX ADMIN — LOSARTAN POTASSIUM 100 MG: 50 TABLET, FILM COATED ORAL at 09:20

## 2024-05-08 ASSESSMENT — PAIN SCALES - GENERAL
PAINLEVEL_OUTOF10: 3
PAINLEVEL_OUTOF10: 0
PAINLEVEL_OUTOF10: 0
PAINLEVEL_OUTOF10: 4
PAINLEVEL_OUTOF10: 0

## 2024-05-08 ASSESSMENT — PAIN DESCRIPTION - DESCRIPTORS: DESCRIPTORS: ACHING;SHARP

## 2024-05-08 ASSESSMENT — PAIN DESCRIPTION - LOCATION: LOCATION: LEG

## 2024-05-08 ASSESSMENT — PAIN - FUNCTIONAL ASSESSMENT: PAIN_FUNCTIONAL_ASSESSMENT: PREVENTS OR INTERFERES SOME ACTIVE ACTIVITIES AND ADLS

## 2024-05-08 ASSESSMENT — PAIN DESCRIPTION - ORIENTATION: ORIENTATION: RIGHT;UPPER

## 2024-05-08 NOTE — PROGRESS NOTES
07:15  Received pt in bed from Giselle Martínez RN.  Pt A&O x 4.  Denies pain.  Right upper thigh incision MARTHA with no drainage, hematoma.  Superficial scratches noted on Right shin that pt states he received while in his yard prior to admission.  Right foot reddened but with capillary refill less than 3 seconds.  Foot is warm with present pedal pulses via doppler.  Heparin drip infusing via a-fib protocol.    07:45  Dr. Sesay at bedside.  OK for pt to get OOB with assistance.  PT/OT to eval and treat.  Will re-assess tomorrow regarding further procedures.    11:00  Echo tech in room.

## 2024-05-08 NOTE — PLAN OF CARE
to Safest Level of Function:   Assess patient stability and activity tolerance for standing, transferring and ambulating with or without assistive devices   Assist with transfers and ambulation using safe patient handling equipment as needed   Ensure adequate protection for wounds/incisions during mobilization   Obtain physical therapy/occupational therapy consults as needed   Instruct patient/family in ordered activity level     Problem: Chronic Conditions and Co-morbidities  Goal: Patient's chronic conditions and co-morbidity symptoms are monitored and maintained or improved  Outcome: Progressing  Flowsheets (Taken 5/8/2024 1200)  Care Plan - Patient's Chronic Conditions and Co-Morbidity Symptoms are Monitored and Maintained or Improved:   Monitor and assess patient's chronic conditions and comorbid symptoms for stability, deterioration, or improvement   Collaborate with multidisciplinary team to address chronic and comorbid conditions and prevent exacerbation or deterioration

## 2024-05-08 NOTE — PROGRESS NOTES
Progress Note  Hospitalist Service    Patient: Jorden Artis MRN: [id number]   SSN: [id number]  YOB: 1960   Age: 64 y.o.  Sex: male      Admit Date: 5/5/2024    LOS: 3 days   Chief Complaint   Patient presents with    Circulatory Problem       Subjective:     Continues to be an absolute pleasure to care for.  No pain. Pulses palpated. Leg is warm.    Slept well overnight.       Objective:     Vitals:  BP (!) 140/86   Pulse 98   Temp 97.8 °F (36.6 °C) (Oral)   Resp (!) 8   Ht 1.778 m (5' 10\")   Wt 86.2 kg (190 lb)   SpO2 97%   BMI 27.26 kg/m²     Physical Exam:       General:  Alert, cooperative, no distress, appears stated age.              Head: Normocephalic, without obvious abnormality, atraumatic.   Eyes:  Conjunctivae/corneas clear. PERRL, EOMs intact.   Nose: Nares normal. No drainage or sinus tenderness.   Neck: Supple, symmetrical, trachea midline, no adenopathy, thyroid: no enlargement, no carotid bruit and no JVD.   Lungs:   Clear to auscultation bilaterally.   Heart:  Irregularly irregular      Abdomen: Soft, non-tender. Bowel sounds normal.    Extremities:  Left AKA. Right leg warm; able to move toes. No swelling.          Skin:  No rashes or lesions   Neurologic: AAOx3, No focal motor or sensory deficit.        Intake and Output:  Current Shift: 05/08 0701 - 05/08 1900  In: 200 [P.O.:200]  Out: -   Last three shifts: 05/06 1901 - 05/08 0700  In: 2963.8 [P.O.:960; I.V.:1953.8]  Out: 3095 [Urine:3095]    Lab/Data Review:  Recent Results (from the past 12 hour(s))   APTT    Collection Time: 05/07/24 11:43 PM   Result Value Ref Range    APTT 119.6 (H) 23.0 - 36.4 SEC   APTT    Collection Time: 05/08/24  6:00 AM   Result Value Ref Range    APTT 72.1 (H) 23.0 - 36.4 SEC   Basic Metabolic Panel w/ Reflex to MG    Collection Time: 05/08/24  6:00 AM   Result Value Ref Range    Sodium 141 136 - 145 mmol/L    Potassium 3.6 3.5 - 5.5 mmol/L    Chloride 112 (H) 100 - 111 mmol/L    CO2 23

## 2024-05-08 NOTE — PLAN OF CARE
Problem: Discharge Planning  Goal: Discharge to home or other facility with appropriate resources  Outcome: Progressing     Problem: Skin/Tissue Integrity  Goal: Absence of new skin breakdown  Description: 1.  Monitor for areas of redness and/or skin breakdown  2.  Assess vascular access sites hourly  3.  Every 4-6 hours minimum:  Change oxygen saturation probe site  4.  Every 4-6 hours:  If on nasal continuous positive airway pressure, respiratory therapy assess nares and determine need for appliance change or resting period.  5/7/2024 2118 by Giselle Martínez RN  Outcome: Progressing  5/7/2024 0720 by Maria Del Carmen Gifford RN  Outcome: Progressing     Problem: Safety - Adult  Goal: Free from fall injury  Outcome: Progressing     Problem: ABCDS Injury Assessment  Goal: Absence of physical injury  Outcome: Progressing     Problem: Pain  Goal: Verbalizes/displays adequate comfort level or baseline comfort level  5/7/2024 2118 by Giselle Martínez RN  Outcome: Progressing  Flowsheets (Taken 5/7/2024 1600 by Astrid Sadler RN)  Verbalizes/displays adequate comfort level or baseline comfort level:   Encourage patient to monitor pain and request assistance   Assess pain using appropriate pain scale   Administer analgesics based on type and severity of pain and evaluate response   Implement non-pharmacological measures as appropriate and evaluate response  5/7/2024 0720 by Maria Del Carmen Gifford RN  Outcome: Progressing     Problem: Cardiovascular - Adult  Goal: Maintains optimal cardiac output and hemodynamic stability  5/7/2024 2118 by Giselle Martínez RN  Outcome: Progressing  5/7/2024 0720 by Maria Del Carmen Gifford RN  Outcome: Progressing     Problem: Musculoskeletal - Adult  Goal: Return mobility to safest level of function  5/7/2024 2118 by Giselle Martínez RN  Outcome: Progressing  5/7/2024 0720 by Maria Del Carmen Gifford RN  Outcome: Progressing     Problem: Chronic Conditions and Co-morbidities  Goal: Patient's chronic conditions and

## 2024-05-08 NOTE — PROGRESS NOTES
right femoropopliteal bypass graft s/p thrombectomy of right leg femoral to tibial bypass by Vascular surgery 5/6/2024. Vascular plans to do a follow up angiogram to optimize tibial flow  - AF with RVR overnight with history of  longstanding persistent AF: Required cardizem gtt, now off. Heparin gtt for AC. On outpatient PO cardizem and lopressor. On Eliquis for AC as outpatient.  -PAD with previous left AKA.  -CAD s/p LCx and RCA PCI 2017. Low risk NST 6/2023. On asa as outpatient  -H/o Nonischemic CMO with improved EF of 55-60% on Echo 4/2021. EF has been as low as 30%. Depressed EF not reported on Echo 3/2022  -Hypertension: BPs normotensive.  -Hyperlipidemia. On statin.     Primary cardiologist: Dr. Valenzuela    Plan:     - Telemetry reviewed, patient in a rate controlled AF. Continue outpatient diltiazem and metoprolol for rate control.  - Continue heparin infusion. Will need to transition back to Eliquis once safe from a vascular surgery standpoint.  - Continue asa, statin, and BB with CAD hx  - Echo pending. Will review once available.    Subjective:     No new complaints.     Objective:      Patient Vitals for the past 8 hrs:   Temp Pulse Resp BP SpO2   05/08/24 0917 -- 98 -- (!) 140/86 --   05/08/24 0800 -- 68 (!) 8 127/81 97 %   05/08/24 0700 -- 92 (!) 8 120/68 95 %   05/08/24 0600 -- 91 11 (!) 148/91 96 %   05/08/24 0500 -- 68 16 (!) 145/87 95 %   05/08/24 0400 97.8 °F (36.6 °C) 72 17 134/74 96 %   05/08/24 0300 -- 75 15 135/84 97 %         Patient Vitals for the past 96 hrs:   Weight   05/05/24 0729 86.2 kg (190 lb)   05/05/24 0645 86.2 kg (190 lb)       TELE: AFIB               Current Facility-Administered Medications   Medication Dose Route Frequency    dilTIAZem (CARDIZEM CD) extended release capsule 120 mg  120 mg Oral Nightly    dilTIAZem (CARDIZEM CD) extended release capsule 180 mg  180 mg Oral Daily    aspirin chewable tablet 81 mg  81 mg Oral Daily    potassium chloride (KLOR-CON M) extended  release tablet 40 mEq  40 mEq Oral PRN    Or    potassium bicarb-citric acid (EFFER-K) effervescent tablet 40 mEq  40 mEq Oral PRN    Or    potassium chloride 10 mEq/100 mL IVPB (Peripheral Line)  10 mEq IntraVENous PRN    heparin 25,000 units in dextrose 5% 250 mL (premix) infusion  5-30 Units/kg/hr IntraVENous Continuous    HYDROcodone-acetaminophen (NORCO) 5-325 MG per tablet 1 tablet  1 tablet Oral Q6H PRN    HYDROmorphone (DILAUDID) injection 0.5 mg  0.5 mg IntraVENous Q3H PRN    0.45 % sodium chloride infusion   IntraVENous Continuous    magnesium sulfate 2000 mg in 50 mL IVPB premix  2,000 mg IntraVENous PRN    heparin (porcine) injection 4,000 Units  4,000 Units IntraVENous PRN    heparin (porcine) injection 2,000 Units  2,000 Units IntraVENous PRN    atorvastatin (LIPITOR) tablet 80 mg  80 mg Oral Daily    cilostazol (PLETAL) tablet 50 mg  50 mg Oral BID AC    furosemide (LASIX) tablet 20 mg  20 mg Oral Daily    losartan (COZAAR) tablet 100 mg  100 mg Oral Daily    metoprolol tartrate (LOPRESSOR) tablet 50 mg  50 mg Oral BID    pregabalin (LYRICA) capsule 75 mg  75 mg Oral TID         Intake/Output Summary (Last 24 hours) at 5/8/2024 1000  Last data filed at 5/8/2024 0925  Gross per 24 hour   Intake 1284.51 ml   Output 1800 ml   Net -515.49 ml       Physical Exam:  General:  alert, appears stated age, and cooperative  Neck:  no JVD  Lungs:  clear to auscultation bilaterally  Heart:  irregularly irregular rhythm  Abdomen:  abdomen is soft without significant tenderness, masses, organomegaly or guarding  Extremities:  extremities normal, atraumatic, no cyanosis or edema    Visit Vitals  BP (!) 140/86   Pulse 98   Temp 97.8 °F (36.6 °C) (Oral)   Resp (!) 8   Ht 1.778 m (5' 10\")   Wt 86.2 kg (190 lb)   SpO2 97%   BMI 27.26 kg/m²       Data Review:     Labs: Results:       Chemistry Recent Labs     05/06/24  0049 05/06/24  1354 05/06/24  2154 05/07/24  0540 05/08/24  0600     --  133* 134* 141   K 3.2*   <

## 2024-05-08 NOTE — CARE COORDINATION
05/08/24 1608   IMM Letter   IMM Letter given to Patient/Family/Significant other/Guardian/POA/by: Jojo Schwab   IMM Letter date given: 05/08/24   IMM Letter time given: 1210     Medicare pt has received, reviewed, and signed 2nd IM letter informing them of their right to appeal the discharge.  Signed copy has been placed on pt bedside chart.          RACHEL Maya RN  Care Management

## 2024-05-08 NOTE — PROGRESS NOTES
No new complaints today  Waiting for echo this morning  No has an excellent pulse at the dorsalis pedis  His right thigh incisions clean dry and intact    Okay to increase mobility, PTOT  I am pleased with his outcome at this point, we will review echo once done

## 2024-05-08 NOTE — PROGRESS NOTES
Assumed care from VICKI Resendiz. Patient is awake and alert, verbalized right foot pain, pain med given at this time. No distress noted. Heparin infusing at this time. No signs of bleeding noted.     0000> Patient remains stable.     0400> VSS, pulses palpable.     0630> Noted a smear of blood on patient's surgical site while giving chlorhexidine bath. Will continue to monitor.

## 2024-05-09 VITALS
HEART RATE: 95 BPM | WEIGHT: 190 LBS | TEMPERATURE: 98 F | BODY MASS INDEX: 27.2 KG/M2 | SYSTOLIC BLOOD PRESSURE: 117 MMHG | HEIGHT: 70 IN | RESPIRATION RATE: 16 BRPM | DIASTOLIC BLOOD PRESSURE: 71 MMHG | OXYGEN SATURATION: 98 %

## 2024-05-09 PROBLEM — I70.90 ARTERIAL OCCLUSION: Status: RESOLVED | Noted: 2024-05-05 | Resolved: 2024-05-09

## 2024-05-09 PROBLEM — I73.9 PAD (PERIPHERAL ARTERY DISEASE) (HCC): Status: ACTIVE | Noted: 2017-01-25

## 2024-05-09 PROBLEM — I82.4Z1: Status: RESOLVED | Noted: 2024-05-06 | Resolved: 2024-05-09

## 2024-05-09 PROBLEM — I50.9 CHRONIC CONGESTIVE HEART FAILURE (HCC): Status: ACTIVE | Noted: 2024-05-09

## 2024-05-09 LAB
APTT PPP: 97.3 SEC (ref 23–36.4)
ERYTHROCYTE [DISTWIDTH] IN BLOOD BY AUTOMATED COUNT: 16.4 % (ref 11.6–14.5)
HCT VFR BLD AUTO: 39.5 % (ref 36–48)
HGB BLD-MCNC: 13.6 G/DL (ref 13–16)
MCH RBC QN AUTO: 30.8 PG (ref 24–34)
MCHC RBC AUTO-ENTMCNC: 34.4 G/DL (ref 31–37)
MCV RBC AUTO: 89.4 FL (ref 78–100)
NRBC # BLD: 0 K/UL (ref 0–0.01)
NRBC BLD-RTO: 0 PER 100 WBC
PLATELET # BLD AUTO: 309 K/UL (ref 135–420)
PMV BLD AUTO: 9.5 FL (ref 9.2–11.8)
RBC # BLD AUTO: 4.42 M/UL (ref 4.35–5.65)
WBC # BLD AUTO: 10.4 K/UL (ref 4.6–13.2)

## 2024-05-09 PROCEDURE — 6370000000 HC RX 637 (ALT 250 FOR IP): Performed by: STUDENT IN AN ORGANIZED HEALTH CARE EDUCATION/TRAINING PROGRAM

## 2024-05-09 PROCEDURE — 85730 THROMBOPLASTIN TIME PARTIAL: CPT

## 2024-05-09 PROCEDURE — 6370000000 HC RX 637 (ALT 250 FOR IP)

## 2024-05-09 PROCEDURE — 99232 SBSQ HOSP IP/OBS MODERATE 35: CPT | Performed by: INTERNAL MEDICINE

## 2024-05-09 PROCEDURE — 6360000002 HC RX W HCPCS: Performed by: SURGERY

## 2024-05-09 PROCEDURE — 97530 THERAPEUTIC ACTIVITIES: CPT

## 2024-05-09 PROCEDURE — 85027 COMPLETE CBC AUTOMATED: CPT

## 2024-05-09 PROCEDURE — 94761 N-INVAS EAR/PLS OXIMETRY MLT: CPT

## 2024-05-09 PROCEDURE — 6370000000 HC RX 637 (ALT 250 FOR IP): Performed by: SURGERY

## 2024-05-09 PROCEDURE — 99239 HOSP IP/OBS DSCHRG MGMT >30: CPT | Performed by: HOSPITALIST

## 2024-05-09 PROCEDURE — 97166 OT EVAL MOD COMPLEX 45 MIN: CPT

## 2024-05-09 PROCEDURE — 36415 COLL VENOUS BLD VENIPUNCTURE: CPT

## 2024-05-09 PROCEDURE — 97535 SELF CARE MNGMENT TRAINING: CPT

## 2024-05-09 PROCEDURE — 97162 PT EVAL MOD COMPLEX 30 MIN: CPT

## 2024-05-09 RX ORDER — HYDROCODONE BITARTRATE AND ACETAMINOPHEN 5; 325 MG/1; MG/1
1 TABLET ORAL EVERY 4 HOURS PRN
Qty: 42 TABLET | Refills: 0 | Status: SHIPPED | OUTPATIENT
Start: 2024-05-09 | End: 2024-05-16

## 2024-05-09 RX ADMIN — CILOSTAZOL 50 MG: 50 TABLET ORAL at 06:17

## 2024-05-09 RX ADMIN — FUROSEMIDE 20 MG: 20 TABLET ORAL at 09:23

## 2024-05-09 RX ADMIN — ASPIRIN 81 MG CHEWABLE TABLET 81 MG: 81 TABLET CHEWABLE at 09:16

## 2024-05-09 RX ADMIN — HEPARIN SODIUM 15 UNITS/KG/HR: 10000 INJECTION, SOLUTION INTRAVENOUS at 06:19

## 2024-05-09 RX ADMIN — PREGABALIN 75 MG: 75 CAPSULE ORAL at 12:44

## 2024-05-09 RX ADMIN — DILTIAZEM HYDROCHLORIDE 180 MG: 180 CAPSULE, COATED, EXTENDED RELEASE ORAL at 09:16

## 2024-05-09 RX ADMIN — METOPROLOL TARTRATE 50 MG: 50 TABLET, FILM COATED ORAL at 09:17

## 2024-05-09 RX ADMIN — PREGABALIN 75 MG: 75 CAPSULE ORAL at 09:16

## 2024-05-09 RX ADMIN — ATORVASTATIN CALCIUM 80 MG: 40 TABLET, FILM COATED ORAL at 09:16

## 2024-05-09 RX ADMIN — APIXABAN 5 MG: 5 TABLET, FILM COATED ORAL at 12:44

## 2024-05-09 RX ADMIN — LOSARTAN POTASSIUM 100 MG: 50 TABLET, FILM COATED ORAL at 09:16

## 2024-05-09 ASSESSMENT — PAIN SCALES - GENERAL
PAINLEVEL_OUTOF10: 0
PAINLEVEL_OUTOF10: 0

## 2024-05-09 NOTE — DISCHARGE INSTR - COC
Continuity of Care Form    Patient Name: Jorden Artis   :  1960  MRN:  [id number]    Admit date:  2024  Discharge date:  ***    Code Status Order: DNR   Advance Directives:   Advance Care Flowsheet Documentation       Date/Time Healthcare Directive Type of Healthcare Directive Copy in Chart Healthcare Agent Appointed Healthcare Agent's Name Healthcare Agent's Phone Number    24 [phone] Yes, patient has an advance directive for healthcare treatment Living will Yes, copy in chart Adult Children Isis Sheth (Cole) --    24 0811 Yes, patient has an advance directive for healthcare treatment -- -- -- -- --            Admitting Physician:  Ayad Sesay MD  PCP: Neeta Park MD    Discharging Nurse: ***  Discharging Hospital Unit/Room#: 2353/01  Discharging Unit Phone Number: ***    Emergency Contact:   Extended Emergency Contact Information  Primary Emergency Contact: Isis Artis   [facility]  Home Phone: [phone]  Relation: Child    Past Surgical History:  Past Surgical History:   Procedure Laterality Date    ABOVE KNEE AMPUTATION Left 2017    S/P Left above-the-knee amputation (2017 - Dr. Estuardo Fulton)    ATHERECTOMY Left 06/15/2017    S/P Atherectomy and balloon angioplasty of the left superficial femoral artery and above-knee popliteal artery (6/15/2017 - Dr. Estuardo Fulton)    ATHERECTOMY Right 2017    S/P Atherectomy and balloon angioplasty of the below-the-knee popliteal artery, above-the-knee popliteal artery, tibioperoneal trunk artery and posterior tibial artery (2017 - Dr. Estuardo Fulton)    CARDIAC CATH PROCEDURE  3/8/2017         CARDIAC CATH PROCEDURE  3/15/2017         COLONOSCOPY  2015    polyps repeat 2020 5 years [facility] Campbell    CORONARY STENT SINGLE W/PTCA  3/15/2017         FEMORAL BYPASS Right 04/10/2017    S/P Right femoral to above-knee popliteal bypass with an 8 mm PTFE graft (4/10/2017 -   Date(s) Administered    COVID-19, MODERNA BLUE border, Primary or Immunocompromised, (age 12y+), IM, 100 mcg/0.5mL 03/26/2021, 04/23/2021, 11/30/2021    Influenza Virus Vaccine 11/20/2017, 09/20/2018    Influenza, FLUARIX, FLULAVAL, FLUZONE (age 6 mo+) AND AFLURIA, (age 3 y+), PF, 0.5mL 11/20/2017, 09/20/2018    TDaP, ADACEL (age 10y-64y), BOOSTRIX (age 10y+), IM, 0.5mL 07/05/2016       Active Problems:  Patient Active Problem List   Diagnosis Code    Moderate to severe pulmonary hypertension ([facility]) I27.20    Adverse effect of amiodarone T46.2X5A    Erectile dysfunction N52.9    Skin ulcer of malleolar area of right ankle ([facility]) L97.319    Acute blood loss as cause of postoperative anemia D62    Atrial fibrillation with rapid ventricular response ([facility]) I48.91    Atherosclerosis of native artery of both lower extremities with intermittent claudication ([facility]) I70.213    DDD (degenerative disc disease), lumbar M51.36    Chronic ulcer of right heel ([facility]) L97.419    Aortoiliac occlusive disease ([facility]) I74.09    Spinal stenosis of lumbar region with radiculopathy M48.061, M54.16    Occlusion of right femoral-popliteal bypass graft ([facility]) T82.898A    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[facility]) 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    Ischemic rest pain of lower extremity M79.606, I99.8    Peripheral artery disease ([facility]) I73.9    Coronary artery disease involving native coronary artery of native heart without angina pectoris I25.10    Chronic anemia D64.9    Prolonged Q-T interval on ECG R94.31    Ischemic cardiomyopathy I25.5    Benign hypertensive heart disease with systolic congestive heart failure, NYHA class 2 ([facility]) I11.0, I50.20    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

## 2024-05-09 NOTE — PLAN OF CARE
Problem: Discharge Planning  Goal: Discharge to home or other facility with appropriate resources  5/9/2024 1159 by Salome Arrieta RN  Outcome: Adequate for Discharge  5/9/2024 1000 by Salome Arrieta RN  Outcome: Progressing  5/8/2024 2303 by Giselle Martínez RN  Outcome: Progressing  Flowsheets (Taken 5/8/2024 2000)  Discharge to home or other facility with appropriate resources:   Identify barriers to discharge with patient and caregiver   Arrange for needed discharge resources and transportation as appropriate   Identify discharge learning needs (meds, wound care, etc)   Refer to discharge planning if patient needs post-hospital services based on physician order or complex needs related to functional status, cognitive ability or social support system     Problem: Skin/Tissue Integrity  Goal: Absence of new skin breakdown  Description: 1.  Monitor for areas of redness and/or skin breakdown  2.  Assess vascular access sites hourly  3.  Every 4-6 hours minimum:  Change oxygen saturation probe site  4.  Every 4-6 hours:  If on nasal continuous positive airway pressure, respiratory therapy assess nares and determine need for appliance change or resting period.  5/9/2024 1159 by Salome Arrieta RN  Outcome: Adequate for Discharge  5/9/2024 1000 by Salome Arrieta RN  Outcome: Progressing  5/8/2024 2303 by Giselle Martínez RN  Outcome: Progressing     Problem: Safety - Adult  Goal: Free from fall injury  5/9/2024 1159 by Salome Arrieta RN  Outcome: Adequate for Discharge  5/8/2024 2303 by Giselle Martínez RN  Outcome: Progressing     Problem: ABCDS Injury Assessment  Goal: Absence of physical injury  5/9/2024 1159 by Salome Arrieta RN  Outcome: Adequate for Discharge  5/9/2024 1000 by Salome Arrieta RN  Outcome: Progressing  5/8/2024 2303 by Giselle Martínez RN  Outcome: Progressing     Problem: Pain  Goal: Verbalizes/displays adequate comfort level or baseline comfort level  5/9/2024 1159 by Salome Arrieta RN  Outcome:

## 2024-05-09 NOTE — PROGRESS NOTES
PHYSICAL THERAPY EVALUATION/DISCHARGE    Patient: Jorden Artis (64 y.o. male)  Date: 5/9/2024  Primary Diagnosis: Arterial occlusion [I70.90]  PAD (peripheral artery disease) ([facility]) [I73.9]  Atrial fibrillation with rapid ventricular response ([facility]) [I48.91]  Acute embolism and thrombosis of deep vein of right distal leg ([facility]) [I82.4Z1]  Procedure(s) (LRB):  THROMBECTOMY REVISION OF RIGHT LEG BYPASS GRAFT (Right) 3 Days Post-Op   Precautions: General Precautions,  ,  ,  ,  ,  ,  ,    PLOF: lives alone in 1 level home, uses w/c within home and prosthetic and crutches in community     ASSESSMENT AND RECOMMENDATIONS:  Patient seen for PT evaluation and one-time treatment. Received seated; agreeable. Able to complete all aspects of functional mobility at [place] level using RW. Appropriate RLE strength and balance observed without balance loss. Most notably presenting with tachycardia with minimal activity. HR up to 140, requiring cues for activity pacing and implementation of energy conservation techniques. RN made aware. No further acute PT needs identified. PT to sign off.     Patient does not require further skilled physical therapy intervention at this level of care.    Further Equipment Recommendations for Discharge: none    [facility]: AM-PAC Inpatient Mobility Raw Score : 23      At this time and based on an AM-PAC score, no further PT is recommended upon discharge due to patient at baseline functional status.  Recommend patient returns to prior setting with prior services.    This [facility] score should be considered in conjunction with interdisciplinary team recommendations to determine the most appropriate discharge setting. Patient's social support, diagnosis, medical stability, and prior level of function should also be taken into consideration.     SUBJECTIVE:   Patient stated “I'm ready to get out of here.”    OBJECTIVE DATA SUMMARY:     Past Medical History:   Diagnosis Date    Acute blood loss as cause of

## 2024-05-09 NOTE — PLAN OF CARE
level     Problem: Chronic Conditions and Co-morbidities  Goal: Patient's chronic conditions and co-morbidity symptoms are monitored and maintained or improved  5/8/2024 2303 by Giselle Martínez, RN  Outcome: Progressing  Flowsheets (Taken 5/8/2024 2000)  Care Plan - Patient's Chronic Conditions and Co-Morbidity Symptoms are Monitored and Maintained or Improved:   Monitor and assess patient's chronic conditions and comorbid symptoms for stability, deterioration, or improvement   Collaborate with multidisciplinary team to address chronic and comorbid conditions and prevent exacerbation or deterioration   Update acute care plan with appropriate goals if chronic or comorbid symptoms are exacerbated and prevent overall improvement and discharge  5/8/2024 1808 by Vero Kam, RN  Outcome: Progressing  Flowsheets (Taken 5/8/2024 1200)  Care Plan - Patient's Chronic Conditions and Co-Morbidity Symptoms are Monitored and Maintained or Improved:   Monitor and assess patient's chronic conditions and comorbid symptoms for stability, deterioration, or improvement   Collaborate with multidisciplinary team to address chronic and comorbid conditions and prevent exacerbation or deterioration

## 2024-05-09 NOTE — DISCHARGE SUMMARY
Patient underwent angiogram and thrombectomy on 5/6/2024.  Patient tolerated procedure well.  Postprocedure was monitored in the ICU closely.  Patient also had A-fib with RVR for which she received Cardizem drip and eventually was transition to p.o. meds.  Cardiology also followed up on the patient.  Echo was done which was within normal limits.  Vascular surgery followed up on the patient today, recommended patient has good pulse and blood flow no need for further intervention and cleared the patient for discharge.  Patient was seen and eval by PT/OT recommended no needs.  Patient was discharged home in stable condition.    Discussed with the patient about discharge plan, follow-up appointments, new medications and side effects.  Patient understood and agreed with the plan.  Answered all his questions and concerns appropriate.        Procedures:   Angiogram and thrombectomy by vascular surgery Dr. Sesay on 5/6/2024    Consults:   Vascular surgery Dr. Sesay  cardiology Dr. Valenzuela    Imaging studies:   CT Result (most recent):  CTA ABDOMINAL AORTA AND BILATERAL ILIOFEMORAL RUNOFF W WO CONTRAST 05/05/2024    Narrative  EXAM:  CTA abdomen and pelvis with CTA of  bilateral lower extremity    CLINICAL INDICATION/HISTORY: Arterial occlusion    COMPARISON: CTA runoff 12/13/2023.    TECHNIQUE: Thin collimation axial images obtained of abdomen and pelvis and  bilateral legs following the administration of intravenous contrast.  Imaging  performed during maximum aortic enhancement. Coronal and sagittal reformations  performed in addition to 3-D volume rendered and MIP images created at  independent workstation for better evaluation of vasculature and to decrease  radiation dose.  CT scans at this facility are performed using dose optimization technique as  appropriate to the performed exam, to include automated exposure control,  adjustment of the mA and/or kV according to patient size (including appropriate  matching    Do not take other medications without consulting your doctor.     DIET:  cardiac diet and diabetic diet    ACTIVITY: activity as tolerated      ADDITIONAL INFORMATION: If you experience any of the following symptoms but not limited to Fever, chills, nausea, vomiting, diarrhea, change in mentation, falling, bleeding, shortness of breath, chest pain, please call your primary care physician or return to the emergency room if you cannot get hold of your doctor:       FOLLOW UP CARE:   Follow-up Information     Neeta Park MD. Go on 5/9/2024.    Specialty: Family Medicine  Why: See Dr. Park on 05/14/24 at 1:15pm  Contact information:  [address] [facility]  [address]  [facility] [address]  [phone]             Ayad Sesay MD. Go on 5/9/2024.    Specialty: Vascular Surgery  Why: See Dr. Sesay on 05/15/24 at 3:45 pm  Contact information:  [address]  [address]  [facility] [address]  [phone]                       Minutes spent on discharge: 40 minutes spent coordinating this discharge (review instructions/follow-up, prescriptions, preparing report for sign off)    Ewelina Barreto MD  5/9/2024 12:34 PM    Disclaimer: Sections of this note are dictated using utilizing voice recognition software. Minor typographical errors may be present. If questions arise, please do not hesitate to contact me or call our department.

## 2024-05-09 NOTE — PLAN OF CARE
Problem: Discharge Planning  Goal: Discharge to home or other facility with appropriate resources  5/9/2024 1000 by Salome Arrieta RN  Outcome: Progressing  5/8/2024 2303 by Giselle Martínez RN  Outcome: Progressing  Flowsheets (Taken 5/8/2024 2000)  Discharge to home or other facility with appropriate resources:   Identify barriers to discharge with patient and caregiver   Arrange for needed discharge resources and transportation as appropriate   Identify discharge learning needs (meds, wound care, etc)   Refer to discharge planning if patient needs post-hospital services based on physician order or complex needs related to functional status, cognitive ability or social support system     Problem: Skin/Tissue Integrity  Goal: Absence of new skin breakdown  Description: 1.  Monitor for areas of redness and/or skin breakdown  2.  Assess vascular access sites hourly  3.  Every 4-6 hours minimum:  Change oxygen saturation probe site  4.  Every 4-6 hours:  If on nasal continuous positive airway pressure, respiratory therapy assess nares and determine need for appliance change or resting period.  5/9/2024 1000 by Salome Arrieta RN  Outcome: Progressing  5/8/2024 2303 by Giselle Martínez RN  Outcome: Progressing     Problem: Safety - Adult  Goal: Free from fall injury  5/8/2024 2303 by Giselle Martínez RN  Outcome: Progressing     Problem: ABCDS Injury Assessment  Goal: Absence of physical injury  5/9/2024 1000 by Salome Arrieta RN  Outcome: Progressing  5/8/2024 2303 by Giselle Martínez RN  Outcome: Progressing     Problem: Pain  Goal: Verbalizes/displays adequate comfort level or baseline comfort level  5/9/2024 1000 by Salome Arrieta RN  Outcome: Progressing  5/8/2024 2303 by Giselle Martínez RN  Outcome: Progressing  Flowsheets (Taken 5/8/2024 2000)  Verbalizes/displays adequate comfort level or baseline comfort level:   Encourage patient to monitor pain and request assistance   Assess pain using appropriate pain  scale   Administer analgesics based on type and severity of pain and evaluate response   Implement non-pharmacological measures as appropriate and evaluate response     Problem: Cardiovascular - Adult  Goal: Maintains optimal cardiac output and hemodynamic stability  5/9/2024 1000 by Salome Arrieta RN  Outcome: Progressing  5/8/2024 2303 by Giselle Martínez RN  Outcome: Progressing     Problem: Musculoskeletal - Adult  Goal: Return mobility to safest level of function  5/9/2024 1000 by Salome Arrieta RN  Outcome: Progressing  5/8/2024 2303 by Giselle Martínez RN  Outcome: Progressing     Problem: Chronic Conditions and Co-morbidities  Goal: Patient's chronic conditions and co-morbidity symptoms are monitored and maintained or improved  5/9/2024 1000 by Salome Arrieta RN  Outcome: Progressing  5/8/2024 2303 by Giselle Martínez RN  Outcome: Progressing  Flowsheets (Taken 5/8/2024 2000)  Care Plan - Patient's Chronic Conditions and Co-Morbidity Symptoms are Monitored and Maintained or Improved:   Monitor and assess patient's chronic conditions and comorbid symptoms for stability, deterioration, or improvement   Collaborate with multidisciplinary team to address chronic and comorbid conditions and prevent exacerbation or deterioration   Update acute care plan with appropriate goals if chronic or comorbid symptoms are exacerbated and prevent overall improvement and discharge

## 2024-05-09 NOTE — PROGRESS NOTES
Patient discharged home, discharge instructions given to patient and review dates, medications regimes, belongings given to patient (cellphone, dentures, wallet, money), accompanied to the front door in wheelchair, patient picked up by brother with a car.

## 2024-05-09 NOTE — CARE COORDINATION
Discharge order noted for today.  Orders reviewed.  No needs identified at this time.  Pt's family will provide transport home.            RACHEL Maya RN  Care Management

## 2024-05-09 NOTE — CARE COORDINATION
05/09/24 1608   Service Assessment   Patient Orientation Alert and Oriented;Person;Place;Situation   Cognition Alert   History Provided By Patient   Primary Caregiver Self   Support Systems Children;Family Members   Patient's Healthcare Decision Maker is: Named in Scanned ACP Document   PCP Verified by CM Yes   Last Visit to PCP Within last 3 months   Prior Functional Level Independent in ADLs/IADLs   Current Functional Level Independent in ADLs/IADLs   Can patient return to prior living arrangement Yes   Ability to make needs known: Good   Family able to assist with home care needs: Yes   Would you like for me to discuss the discharge plan with any other family members/significant others, and if so, who? Yes  (Daughter Isis)   Financial Resources Medicare   Community Resources None   Social/Functional History   Lives With Alone   Type of Home House   Home Layout One level   Home Access Stairs to enter with rails   Entrance Stairs - Number of Steps 3   Bathroom Shower/Tub Tub/Shower unit   Bathroom Toilet Standard   Bathroom Equipment Shower chair   Home Equipment Wheelchair - Manual;Crutches;Walker - Rolling   Receives Help From Family   ADL Assistance Independent   Ambulation Assistance Independent   Transfer Assistance Independent   Occupation Retired   Discharge Planning   Type of Residence House   Living Arrangements Alone   Current Services Prior To Admission None   Current DME Prior to Arrival Wheelchair   Potential Assistance Needed N/A   DME Ordered? No   Potential Assistance Purchasing Medications No   Type of Home Care Services None   Patient expects to be discharged to: House   Services At/After Discharge   Transition of Care Consult (CM Consult) N/A   Services At/After Discharge None   [place] Resource Information Provided? No   Mode of Transport at Discharge Other (see comment)  (family)   Confirm Follow Up Transport Family   Condition of Participation: Discharge Planning   The Plan for

## 2024-05-09 NOTE — PROGRESS NOTES
Open ambulatory today  No complaints no ischemic pain  Back to baseline from cardiology standpoint  Incision clean dry and intact on the right leg  Excellent pulse on the right foot  Okay for discharge today  Resume Eliquis

## 2024-05-09 NOTE — DISCHARGE INSTRUCTIONS
Discharge Instructions    Patient: Jorden Artis MRN: [id number]  CSN: [id number]    YOB: 1960  Age: 64 y.o.  Sex: male    DOA: 5/5/2024       DIET:  cardiac diet and diabetic diet    ACTIVITY: activity as tolerated      ADDITIONAL INFORMATION: If you experience any of the following symptoms but not limited to Fever, chills, nausea, vomiting, diarrhea, change in mentation, falling, bleeding, shortness of breath, chest pain, please call your primary care physician or return to the emergency room if you cannot get hold of your doctor:     FOLLOW UP CARE:   Follow-up Information     Neeta Park MD. Go on 5/9/2024.    Specialty: Family Medicine  Why: See Dr. Park on 05/14/24 at 1:15pm  Contact information:  [address] [facility]  [address]  [facility] [address]  [phone]             Ayad Sesay MD. Go on 5/9/2024.    Specialty: Vascular Surgery  Why: See Dr. Sesay on 05/15/24 at 3:45 pm  Contact information:  [address]  [address]  [facility] [address]  [phone]                       Ewelina Barreto MD  5/9/2024 12:33 PM

## 2024-05-09 NOTE — PROGRESS NOTES
[facility] - Progress Note    Admit Date: 5/5/2024  Attending Cardiologist: Dr. Valenzuela    Assessment:     Patient Active Problem List    Diagnosis Date Noted    Acute embolism and thrombosis of deep vein of right distal leg ([facility]) 05/06/2024    Arterial occlusion 05/05/2024    IFG (impaired fasting glucose) 03/25/2024    Amputation of toe of right foot ([facility]) 03/25/2024    Atrial fibrillation with rapid ventricular response ([facility])     Occlusion of right femoral-popliteal bypass graft ([facility]) 02/28/2022    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[facility]) 03/31/2021    Skin ulcer of malleolar area of right ankle ([facility])     Ischemic cardiomyopathy     Chronic systolic heart failure ([facility])     Adverse effect of amiodarone 08/19/2017    Prolonged Q-T interval on ECG 08/19/2017    Status post above-knee amputation of left lower extremity ([facility]) 08/18/2017    Ischemic rest pain of lower extremity 08/14/2017    Chronic ulcer of right heel ([facility]) 08/08/2017    Chronic anemia     Acute blood loss as cause of postoperative anemia 07/25/2017    Impaired mobility and ADLs 07/24/2017    Moderate to severe pulmonary hypertension ([facility]) 07/23/2017    Pseudoaneurysm of femoral artery ([facility]) 06/27/2017    Status post femoral-popliteal bypass surgery 04/21/2017    Dyslipidemia     Carotid artery disease ([facility])     History of cardioversion 04/18/2017    Coronary artery disease involving native coronary artery of native heart without angina pectoris 03/15/2017    Atherosclerosis of native artery of both lower extremities with intermittent claudication ([facility]) 01/25/2017    Aortoiliac occlusive disease ([facility]) 01/25/2017    Peripheral artery disease ([facility]) 01/25/2017    Erectile dysfunction 07/05/2016    Spinal stenosis of lumbar region with radiculopathy 05/04/2015    DDD (degenerative disc disease), lumbar 01/26/2015    Benign hypertensive heart disease with systolic congestive heart failure, NYHA class 2 ([facility]) 01/26/2015     - Occlusion of right

## 2024-05-09 NOTE — PROGRESS NOTES
OCCUPATIONAL THERAPY EVALUATION/DISCHARGE    Patient: Jorden Artis (64 y.o. male)  Date: 5/9/2024  Primary Diagnosis: Arterial occlusion [I70.90]  PAD (peripheral artery disease) ([facility]) [I73.9]  Atrial fibrillation with rapid ventricular response ([facility]) [I48.91]  Acute embolism and thrombosis of deep vein of right distal leg ([facility]) [I82.4Z1]  Procedure(s) (LRB):  THROMBECTOMY REVISION OF RIGHT LEG BYPASS GRAFT (Right) 3 Days Post-Op   Precautions: General Precautions  PLOF: Patient was independent with self-care and used a wheelchair for functional mobility in the home, stand pivot transfer. Patient reports for community reintegration he utilizes crutches or rolling walker and prosthesis.       ASSESSMENT AND RECOMMENDATIONS:  Pt cleared to participate in OT evaluation by RN. Pt seated in chair, alert, and agreeable to participate. Educated on the role of OT, evaluation process, and safety during this admission with pt verbalizing understanding. Patient independent for upper body dressing and mod (I) for doffing/ donning RLE sock. Patient without prosthesis in room however agreeable to stand pivot transfer to bed simulating toilet transfer. Patient modified independent for task with OT for line management. Based on the objective data described below, pt presents with no deficits that impede pt function with ADLs. At this time pt is safe to d/c home with family support from selfcare standpoint. Pt left all needs met and call bell in reach.      HR ranged from 105 bpm - 141 bpm this session, RN and cardiology NP notified/ aware.       Maximum therapeutic gains met at current level of care and patient will be discharged from occupational therapy at this time.    Further Equipment Recommendations for Discharge: Pt has all DME    [facility]: AM-PAC Inpatient Daily Activity Raw Score: 24    At this time and based on an AM-PAC score, no further OT is recommended upon discharge due to patient at baseline functional status

## 2024-05-10 ENCOUNTER — TELEPHONE (OUTPATIENT)
Age: 64
End: 2024-05-10

## 2024-05-10 ENCOUNTER — CLINICAL DOCUMENTATION (OUTPATIENT)
Age: 64
End: 2024-05-10

## 2024-05-10 NOTE — PROGRESS NOTES
PATIENT REQUIRES TCM OUTREACH    MRN: [id number]     PATIENT:  Jorden Artis    ADMIT DATE:  5/5/24    DISCHARGE DATE:  5/9/24     ADMITTING DX: CAD with thrombectomy and bypass graft    MEDICATION CHANGES:   Start: Norco  Stop:   Change:    SPECIALISTS:   vascular    HOME HEALTH/WOUND CARE/PT/OT:  none    SCHEDULED FOLLOW UP APPTS:    Future Appointments   Date Time Provider Department Center   5/14/2024  1:15 PM Neeta Park MD HR [facility]   6/12/2024 11:00 AM Nadeem Valenzuela MD [facility]   9/26/2024  8:30 AM Neeta Park MD HR [facility] AMB       *Please contact patient within 2 business days and document under .TCMOFFICE.  A scheduled Hospital Follow-up for patient within 7 days is preferred*

## 2024-05-10 NOTE — TELEPHONE ENCOUNTER
Care Transitions Initial Follow Up Call    Outreach made within 2 business days of discharge: Yes    Patient: Jorden Artis Patient : 1960   MRN: [id number]  Reason for Admission: There are no discharge diagnoses documented for the most recent discharge.  Discharge Date: 24       Spoke with: Mr. Jorden Artis     Discharge department/facility: [facility]     TCM Interactive Patient Contact:  Was patient able to fill all prescriptions: Yes  Was patient instructed to bring all medications to the follow-up visit: Yes  Is patient taking all medications as directed in the discharge summary? Yes  Does patient understand their discharge instructions: Yes  Does patient have questions or concerns that need addressed prior to 7-14 day follow up office visit:  - No    Scheduled appointment with PCP within 7-14 days    Follow Up  Future Appointments   Date Time Provider Department Center   2024  1:15 PM Neeta Park MD HR [facility]   2024 11:00 AM Nadeem Valenzuela MD [facility]   2024  8:30 AM Neeta Park MD HR [facility] AMB       Jerri Colon

## 2024-05-14 ENCOUNTER — OFFICE VISIT (OUTPATIENT)
Age: 64
End: 2024-05-14

## 2024-05-14 VITALS
BODY MASS INDEX: 27.77 KG/M2 | HEIGHT: 70 IN | RESPIRATION RATE: 16 BRPM | HEART RATE: 76 BPM | WEIGHT: 194 LBS | OXYGEN SATURATION: 99 % | DIASTOLIC BLOOD PRESSURE: 64 MMHG | TEMPERATURE: 98 F | SYSTOLIC BLOOD PRESSURE: 108 MMHG

## 2024-05-14 DIAGNOSIS — I25.5 ISCHEMIC CARDIOMYOPATHY: ICD-10-CM

## 2024-05-14 DIAGNOSIS — Z89.612 STATUS POST ABOVE-KNEE AMPUTATION OF LEFT LOWER EXTREMITY (HCC): ICD-10-CM

## 2024-05-14 DIAGNOSIS — I73.9 PAD (PERIPHERAL ARTERY DISEASE) (HCC): ICD-10-CM

## 2024-05-14 DIAGNOSIS — I25.10 CORONARY ARTERY DISEASE INVOLVING NATIVE CORONARY ARTERY OF NATIVE HEART WITHOUT ANGINA PECTORIS: ICD-10-CM

## 2024-05-14 DIAGNOSIS — I74.09 AORTOILIAC OCCLUSIVE DISEASE (HCC): ICD-10-CM

## 2024-05-14 DIAGNOSIS — S98.131A AMPUTATION OF TOE OF RIGHT FOOT (HCC): ICD-10-CM

## 2024-05-14 DIAGNOSIS — I50.22 CHRONIC SYSTOLIC HEART FAILURE (HCC): ICD-10-CM

## 2024-05-14 DIAGNOSIS — I70.213 ATHEROSCLEROSIS OF NATIVE ARTERY OF BOTH LOWER EXTREMITIES WITH INTERMITTENT CLAUDICATION (HCC): ICD-10-CM

## 2024-05-14 DIAGNOSIS — Z09 HOSPITAL DISCHARGE FOLLOW-UP: ICD-10-CM

## 2024-05-14 DIAGNOSIS — R73.01 IFG (IMPAIRED FASTING GLUCOSE): ICD-10-CM

## 2024-05-14 DIAGNOSIS — T82.898D OCCLUSION OF RIGHT FEMOROPOPLITEAL BYPASS GRAFT, SUBSEQUENT ENCOUNTER: Primary | ICD-10-CM

## 2024-05-14 NOTE — PROGRESS NOTES
\"Have you been to the ER, urgent care clinic since your last visit?  Hospitalized since your last visit?\"    NO    “Have you seen or consulted any other health care providers outside of [facility] since your last visit?”    NO        “Have you had a colorectal cancer screening such as a colonoscopy/FIT/Cologuard?    NO    Date of last Colonoscopy: 7/23/2015  No cologuard on file  No FIT/FOBT on file   No flexible sigmoidoscopy on file         Click Here for Release of Records Request  
upper thigh incision appears clean and dry  Skin: no edema  Psych:  mood and affect normal        ASSESSMENT/PLAN  Diagnoses and all orders for this visit:    Jorden was seen today for follow-up from hospital.    Diagnoses and all orders for this visit:    Occlusion of right femoropopliteal bypass graft, subsequent encounter    Peripheral artery disease (HCC)  Recent right femoropopliteal occlusion status post thrombectomy 5/6/2024   S/p thrombectomy/balloon angioplasty 12/2021  S/p AKA 2017 L  S/p amputation R 4-5th toes and graft repair 2022  On asa,statin, pletaal  Following vasc sx      Paroxysmal atrial fibrillation (HCC)  Rate controlled, anticoagulated  On xarelto, ccb, BB  Following cardiology dr mai     Dyslipidemia  LDL goal <70  Cont lipitor  Lipid panel 3/2024     Impaired fasting glucose  Monitoring    Atherosclerosis of native artery of both lower extremities with intermittent claudication (HCC)  On asa, BB    Status post above knee amputation Left lower extremity (HCC)      Amputation of toe of right foot (HCC)     Chronic systolic heart failure  Compensated  Echo 55% EF 5/20/2024  On bb/arb/lasix  Following cardiology    Ischemic cardiomyopathy    Coronary artery disease involving native coronary artery of native heart without angina pectoris  Asymptomatic  On asa, BB  Following dr. mai    Hospital discharge follow-up  -     WI DISCHARGE MEDS RECONCILED W/ CURRENT OUTPATIENT MED LIST      Keep appointment in September or sooner as needed      Patient understands plan of care. Patient has provided input and agrees with goals.

## 2024-05-28 ENCOUNTER — TELEPHONE (OUTPATIENT)
Age: 64
End: 2024-05-28

## 2024-05-28 RX ORDER — METOPROLOL TARTRATE 50 MG/1
TABLET, FILM COATED ORAL
Qty: 180 TABLET | Refills: 3 | Status: SHIPPED | OUTPATIENT
Start: 2024-05-28

## 2024-05-28 NOTE — TELEPHONE ENCOUNTER
This pharmacy faxed over request for the following prescriptions to be filled:    Medication requested : metoprolol tartrate (LOPRESSOR) 50 MG tablet  refill 1  PCP: Xavier  Pharmacy or Print: [facility]   Mail order or Local pharmacy [facility]     Scheduled appointment if not seen by current providers in office: LOV 5/14/2024 FU 9/26/2024

## 2024-06-11 RX ORDER — LOSARTAN POTASSIUM 100 MG/1
TABLET ORAL
Qty: 90 TABLET | Refills: 1 | Status: SHIPPED | OUTPATIENT
Start: 2024-06-11

## 2024-06-12 ENCOUNTER — OFFICE VISIT (OUTPATIENT)
Age: 64
End: 2024-06-12
Payer: MEDICARE

## 2024-06-12 VITALS
BODY MASS INDEX: 28.2 KG/M2 | OXYGEN SATURATION: 98 % | DIASTOLIC BLOOD PRESSURE: 62 MMHG | SYSTOLIC BLOOD PRESSURE: 110 MMHG | HEART RATE: 72 BPM | HEIGHT: 70 IN | WEIGHT: 197 LBS

## 2024-06-12 DIAGNOSIS — I25.10 CORONARY ARTERY DISEASE, UNSPECIFIED VESSEL OR LESION TYPE, UNSPECIFIED WHETHER ANGINA PRESENT, UNSPECIFIED WHETHER NATIVE OR TRANSPLANTED HEART: ICD-10-CM

## 2024-06-12 DIAGNOSIS — I25.5 ISCHEMIC CARDIOMYOPATHY: ICD-10-CM

## 2024-06-12 DIAGNOSIS — I70.213 ATHEROSCLEROSIS OF NATIVE ARTERIES OF EXTREMITIES WITH INTERMITTENT CLAUDICATION, BILATERAL LEGS (HCC): Primary | ICD-10-CM

## 2024-06-12 DIAGNOSIS — E78.5 HYPERLIPIDEMIA, UNSPECIFIED HYPERLIPIDEMIA TYPE: ICD-10-CM

## 2024-06-12 DIAGNOSIS — I48.0 PAROXYSMAL ATRIAL FIBRILLATION (HCC): ICD-10-CM

## 2024-06-12 PROCEDURE — 99214 OFFICE O/P EST MOD 30 MIN: CPT | Performed by: INTERNAL MEDICINE

## 2024-06-12 PROCEDURE — 1123F ACP DISCUSS/DSCN MKR DOCD: CPT | Performed by: INTERNAL MEDICINE

## 2024-06-12 PROCEDURE — 93000 ELECTROCARDIOGRAM COMPLETE: CPT | Performed by: INTERNAL MEDICINE

## 2024-06-12 ASSESSMENT — PATIENT HEALTH QUESTIONNAIRE - PHQ9
SUM OF ALL RESPONSES TO PHQ QUESTIONS 1-9: 0
SUM OF ALL RESPONSES TO PHQ9 QUESTIONS 1 & 2: 0
2. FEELING DOWN, DEPRESSED OR HOPELESS: NOT AT ALL
SUM OF ALL RESPONSES TO PHQ QUESTIONS 1-9: 0
SUM OF ALL RESPONSES TO PHQ QUESTIONS 1-9: 0
1. LITTLE INTEREST OR PLEASURE IN DOING THINGS: NOT AT ALL
SUM OF ALL RESPONSES TO PHQ QUESTIONS 1-9: 0

## 2024-06-12 ASSESSMENT — ANXIETY QUESTIONNAIRES
2. NOT BEING ABLE TO STOP OR CONTROL WORRYING: NOT AT ALL
1. FEELING NERVOUS, ANXIOUS, OR ON EDGE: NOT AT ALL
7. FEELING AFRAID AS IF SOMETHING AWFUL MIGHT HAPPEN: NOT AT ALL
6. BECOMING EASILY ANNOYED OR IRRITABLE: NOT AT ALL
4. TROUBLE RELAXING: NOT AT ALL
IF YOU CHECKED OFF ANY PROBLEMS ON THIS QUESTIONNAIRE, HOW DIFFICULT HAVE THESE PROBLEMS MADE IT FOR YOU TO DO YOUR WORK, TAKE CARE OF THINGS AT HOME, OR GET ALONG WITH OTHER PEOPLE: NOT DIFFICULT AT ALL
GAD7 TOTAL SCORE: 0
3. WORRYING TOO MUCH ABOUT DIFFERENT THINGS: NOT AT ALL
5. BEING SO RESTLESS THAT IT IS HARD TO SIT STILL: NOT AT ALL

## 2024-06-12 NOTE — PROGRESS NOTES
Jorden Artis presents today for   Chief Complaint   Patient presents with    Follow-Up from Hospital    Follow-up     Overdue       Jorden Artis preferred language for health care discussion is english/other.    Is someone accompanying this pt? no    Is the patient using any DME equipment during OV? yes    Depression Screening:  Depression: Not at risk (6/12/2024)    PHQ-2     PHQ-2 Score: 0        Learning Assessment:  Who is the primary learner? Patient    What is the preferred language for health care of the primary learner? ENGLISH    How does the primary learner prefer to learn new concepts? DEMONSTRATION    Answered By patient    Relationship to Learner SELF           Pt currently taking Anticoagulant therapy? Eliquis 5 mg twice a day    Pt currently taking Antiplatelet therapy ? Aspirin 81 mg daily      Coordination of Care:  1. Have you been to the ER, urgent care clinic since your last visit? Hospitalized since your last visit? yes    2. Have you seen or consulted any other health care providers outside of the [facility] since your last visit? Include any pap smears or colon screening. no

## 2024-06-12 NOTE — PROGRESS NOTES
HISTORY OF PRESENT ILLNESS  Jorden Artis  64 y.o. male     Chief Complaint   Patient presents with    Follow-Up from Hospital    Follow-up     Overdue       ASSESSMENT and PLAN    The primary encounter diagnosis was Atherosclerosis of native arteries of extremities with intermittent claudication, bilateral legs (HCC). Diagnoses of Coronary artery disease, unspecified vessel or lesion type, unspecified whether angina present, unspecified whether native or transplanted heart, Ischemic cardiomyopathy, Paroxysmal atrial fibrillation (HCC), and Hyperlipidemia, unspecified hyperlipidemia type were also pertinent to this visit.    Mr. Jorden Artis, previous Dr. Rushing patient, has known history of PVD, as well as CAD.  His coronary angiography back in 2017 showed EF 30% with mild left main and LAD disease.  Circumflex had proximal 70% lesion as well as trifurcation healed ruptured plaque with residual 90% stenosis.  RCA had diffuse 50% with focal mid 90% disease.  Subsequently, he had his circumflex and RCA stented to 0%.  His nuclear scan back in November 2020 showed low risk finding with EF 51%.  He has history of left leg AKA (2017) and right leg arterial bypass surgery.  He has CAF, as well as dyslipidemia.  He was hospitalized March 2021 at [facility] for right leg arterial thrombectomy.  He was noted to be in atrial fibrillation.  In February 2022, he was admitted with right femoral artery/popliteal artery bypass graft occlusion.  During that hospitalization, echocardiogram revealed reduced EF.  In 2022, he retired from .  His nuclear scan done in June 2023 showed low risk finding with normal perfusion.  His echocardiogram done in May 2024 while hospitalized for PAD shows EF 50-55% with RVSP 27 mmHg.    He was hospitalized at [facility] in May 2024 for thrombectomy and right leg bypass graft revision.    CAD:    Clinically stable.  He has not had any chest discomfort.  CAF:

## 2024-08-20 LAB
A/G RATIO: 1.5 RATIO (ref 1.1–2.6)
ALBUMIN: 4 G/DL (ref 3.5–5)
ALP BLD-CCNC: 175 U/L (ref 40–125)
ALT SERPL-CCNC: 12 U/L (ref 5–40)
ANION GAP SERPL CALCULATED.3IONS-SCNC: 13 MMOL/L (ref 3–15)
AST SERPL-CCNC: 19 U/L (ref 10–37)
BILIRUB SERPL-MCNC: 0.6 MG/DL (ref 0.2–1.2)
BUN BLDV-MCNC: 11 MG/DL (ref 6–22)
CALCIUM SERPL-MCNC: 9.1 MG/DL (ref 8.4–10.5)
CHLORIDE BLD-SCNC: 105 MMOL/L (ref 98–110)
CO2: 23 MMOL/L (ref 20–32)
CREAT SERPL-MCNC: 1 MG/DL (ref 0.8–1.6)
ESTIMATED AVERAGE GLUCOSE: 112 MG/DL (ref 91–123)
GFR, ESTIMATED: >60 ML/MIN/1.73 SQ.M.
GLOBULIN: 2.7 G/DL (ref 2–4)
GLUCOSE: 98 MG/DL (ref 70–99)
HBA1C MFR BLD: 5.5 % (ref 4.8–5.6)
POTASSIUM SERPL-SCNC: 5 MMOL/L (ref 3.5–5.5)
SODIUM BLD-SCNC: 141 MMOL/L (ref 133–145)
TOTAL PROTEIN: 6.7 G/DL (ref 6.2–8.1)

## 2024-09-26 ENCOUNTER — OFFICE VISIT (OUTPATIENT)
Facility: CLINIC | Age: 64
End: 2024-09-26
Payer: MEDICARE

## 2024-09-26 VITALS
RESPIRATION RATE: 16 BRPM | WEIGHT: 195.6 LBS | BODY MASS INDEX: 28 KG/M2 | OXYGEN SATURATION: 98 % | HEIGHT: 70 IN | DIASTOLIC BLOOD PRESSURE: 70 MMHG | HEART RATE: 85 BPM | TEMPERATURE: 98.6 F | SYSTOLIC BLOOD PRESSURE: 110 MMHG

## 2024-09-26 DIAGNOSIS — Z00.00 MEDICARE ANNUAL WELLNESS VISIT, SUBSEQUENT: Primary | ICD-10-CM

## 2024-09-26 DIAGNOSIS — R73.01 IFG (IMPAIRED FASTING GLUCOSE): ICD-10-CM

## 2024-09-26 DIAGNOSIS — E55.9 VITAMIN D DEFICIENCY: ICD-10-CM

## 2024-09-26 DIAGNOSIS — I25.10 CORONARY ARTERY DISEASE INVOLVING NATIVE CORONARY ARTERY OF NATIVE HEART WITHOUT ANGINA PECTORIS: ICD-10-CM

## 2024-09-26 DIAGNOSIS — I74.09 AORTOILIAC OCCLUSIVE DISEASE (HCC): ICD-10-CM

## 2024-09-26 DIAGNOSIS — I48.0 PAROXYSMAL A-FIB (HCC): ICD-10-CM

## 2024-09-26 DIAGNOSIS — I73.9 PAD (PERIPHERAL ARTERY DISEASE) (HCC): ICD-10-CM

## 2024-09-26 DIAGNOSIS — Z89.612 STATUS POST ABOVE-KNEE AMPUTATION OF LEFT LOWER EXTREMITY (HCC): ICD-10-CM

## 2024-09-26 DIAGNOSIS — I25.5 ISCHEMIC CARDIOMYOPATHY: ICD-10-CM

## 2024-09-26 DIAGNOSIS — E78.5 DYSLIPIDEMIA: ICD-10-CM

## 2024-09-26 PROCEDURE — 1123F ACP DISCUSS/DSCN MKR DOCD: CPT | Performed by: FAMILY MEDICINE

## 2024-09-26 PROCEDURE — 99214 OFFICE O/P EST MOD 30 MIN: CPT | Performed by: FAMILY MEDICINE

## 2024-09-26 PROCEDURE — G0439 PPPS, SUBSEQ VISIT: HCPCS | Performed by: FAMILY MEDICINE

## 2024-09-26 ASSESSMENT — PATIENT HEALTH QUESTIONNAIRE - PHQ9
SUM OF ALL RESPONSES TO PHQ9 QUESTIONS 1 & 2: 0
SUM OF ALL RESPONSES TO PHQ QUESTIONS 1-9: 0
SUM OF ALL RESPONSES TO PHQ QUESTIONS 1-9: 0
1. LITTLE INTEREST OR PLEASURE IN DOING THINGS: NOT AT ALL
SUM OF ALL RESPONSES TO PHQ QUESTIONS 1-9: 0
2. FEELING DOWN, DEPRESSED OR HOPELESS: NOT AT ALL
SUM OF ALL RESPONSES TO PHQ QUESTIONS 1-9: 0

## 2024-09-26 ASSESSMENT — LIFESTYLE VARIABLES
HOW OFTEN DO YOU HAVE A DRINK CONTAINING ALCOHOL: 2-3 TIMES A WEEK
HOW MANY STANDARD DRINKS CONTAINING ALCOHOL DO YOU HAVE ON A TYPICAL DAY: 3 OR 4

## 2024-09-30 RX ORDER — FUROSEMIDE 20 MG
20 TABLET ORAL DAILY
Qty: 90 TABLET | Refills: 1 | Status: SHIPPED | OUTPATIENT
Start: 2024-09-30

## 2024-12-09 RX ORDER — LOSARTAN POTASSIUM 100 MG/1
TABLET ORAL
Qty: 90 TABLET | Refills: 1 | Status: SHIPPED | OUTPATIENT
Start: 2024-12-09

## 2024-12-11 ENCOUNTER — OFFICE VISIT (OUTPATIENT)
Age: 64
End: 2024-12-11
Payer: MEDICARE

## 2024-12-11 VITALS
DIASTOLIC BLOOD PRESSURE: 70 MMHG | HEART RATE: 81 BPM | OXYGEN SATURATION: 98 % | SYSTOLIC BLOOD PRESSURE: 114 MMHG | WEIGHT: 200 LBS | BODY MASS INDEX: 28.63 KG/M2 | HEIGHT: 70 IN

## 2024-12-11 DIAGNOSIS — I70.213 ATHEROSCLEROSIS OF NATIVE ARTERIES OF EXTREMITIES WITH INTERMITTENT CLAUDICATION, BILATERAL LEGS (HCC): Primary | ICD-10-CM

## 2024-12-11 DIAGNOSIS — E78.5 HYPERLIPIDEMIA, UNSPECIFIED HYPERLIPIDEMIA TYPE: ICD-10-CM

## 2024-12-11 DIAGNOSIS — I48.0 PAROXYSMAL ATRIAL FIBRILLATION (HCC): ICD-10-CM

## 2024-12-11 DIAGNOSIS — I25.10 CORONARY ARTERY DISEASE, UNSPECIFIED VESSEL OR LESION TYPE, UNSPECIFIED WHETHER ANGINA PRESENT, UNSPECIFIED WHETHER NATIVE OR TRANSPLANTED HEART: ICD-10-CM

## 2024-12-11 DIAGNOSIS — I25.5 ISCHEMIC CARDIOMYOPATHY: ICD-10-CM

## 2024-12-11 PROCEDURE — 1160F RVW MEDS BY RX/DR IN RCRD: CPT | Performed by: INTERNAL MEDICINE

## 2024-12-11 PROCEDURE — 1159F MED LIST DOCD IN RCRD: CPT | Performed by: INTERNAL MEDICINE

## 2024-12-11 PROCEDURE — 99214 OFFICE O/P EST MOD 30 MIN: CPT | Performed by: INTERNAL MEDICINE

## 2024-12-11 PROCEDURE — 1126F AMNT PAIN NOTED NONE PRSNT: CPT | Performed by: INTERNAL MEDICINE

## 2024-12-11 PROCEDURE — 93000 ELECTROCARDIOGRAM COMPLETE: CPT | Performed by: INTERNAL MEDICINE

## 2024-12-11 PROCEDURE — 1123F ACP DISCUSS/DSCN MKR DOCD: CPT | Performed by: INTERNAL MEDICINE

## 2024-12-11 ASSESSMENT — PATIENT HEALTH QUESTIONNAIRE - PHQ9
1. LITTLE INTEREST OR PLEASURE IN DOING THINGS: NOT AT ALL
2. FEELING DOWN, DEPRESSED OR HOPELESS: NOT AT ALL
SUM OF ALL RESPONSES TO PHQ QUESTIONS 1-9: 0
SUM OF ALL RESPONSES TO PHQ QUESTIONS 1-9: 0
SUM OF ALL RESPONSES TO PHQ9 QUESTIONS 1 & 2: 0
SUM OF ALL RESPONSES TO PHQ QUESTIONS 1-9: 0
SUM OF ALL RESPONSES TO PHQ QUESTIONS 1-9: 0

## 2024-12-11 NOTE — PROGRESS NOTES
HISTORY OF PRESENT ILLNESS  Jorden Artis  64 y.o. male     Chief Complaint   Patient presents with    Follow-up     6 month f/u        ASSESSMENT and PLAN    The primary encounter diagnosis was Atherosclerosis of native arteries of extremities with intermittent claudication, bilateral legs (HCC). Diagnoses of Coronary artery disease, unspecified vessel or lesion type, unspecified whether angina present, unspecified whether native or transplanted heart, Ischemic cardiomyopathy, Paroxysmal atrial fibrillation (HCC), and Hyperlipidemia, unspecified hyperlipidemia type were also pertinent to this visit.    Mr. Jorden Artis, previous Dr. Rushing patient, has known history of PVD, as well as CAD.  His coronary angiography back in 2017 showed EF 30% with mild left main and LAD disease.  Circumflex had proximal 70% lesion as well as trifurcation healed ruptured plaque with residual 90% stenosis.  RCA had diffuse 50% with focal mid 90% disease.  Subsequently, he had his circumflex and RCA stented to 0%.  His nuclear scan in November 2020 showed low risk finding with EF 51%.    He has history of left leg AKA (2017) and right leg arterial bypass surgery.  He has CAF, as well as dyslipidemia.  He was hospitalized March 2021 at [facility] for right leg arterial thrombectomy.  He was noted to be in atrial fibrillation.  In February 2022, he was admitted with right femoral artery/popliteal artery bypass graft occlusion.  During that hospitalization, echocardiogram revealed reduced EF.  In 2022, he retired from .  His nuclear scan done in June 2023 showed low risk finding with normal perfusion.  His echocardiogram done in May 2024 while hospitalized for PAD showed EF 50-55% with RVSP 27 mmHg.    He was hospitalized at [facility] in May 2024 for thrombectomy and right leg bypass graft revision.    Medication regimen reviewed and current cardiac regimen will be continued.  All new testing since

## 2024-12-11 NOTE — PROGRESS NOTES
Jorden Artis presents today for   Chief Complaint   Patient presents with    Follow-up     6 month f/u        Jorden Artis preferred language for health care discussion is english/other.    Is someone accompanying this pt? no    Is the patient using any DME equipment during OV? yes    Depression Screening:  Depression: Not at risk (12/11/2024)    PHQ-2     PHQ-2 Score: 0        Learning Assessment:  Who is the primary learner? Patient    What is the preferred language for health care of the primary learner? ENGLISH    How does the primary learner prefer to learn new concepts? DEMONSTRATION    Answered By patient    Relationship to Learner SELF           Pt currently taking Anticoagulant therapy? Eliquis 5 mg BID     Pt currently taking Antiplatelet therapy ? ASA 81 mg QD       Coordination of Care:  1. Have you been to the ER, urgent care clinic since your last visit? Hospitalized since your last visit? no    2. Have you seen or consulted any other health care providers outside of the [facility] since your last visit? Include any pap smears or colon screening. no

## 2025-01-06 NOTE — PROGRESS NOTES
He is on amiodarone and whether or not he needs to be on that long-term is really not clear. I am going to get some pulmonary function tests with DLCO's and thyroid function tests and will review the chart to see how difficult it was to keep him in sinus, but I may well plan on discontinuing the amiodarone pending the results of my review and the above laboratory testing.  Waiting on final results of PFT's
Per your last note \" He is on amiodarone and whether or not he needs to be on that long-term is really not clear. I am going to get some pulmonary function tests with DLCO's and thyroid function tests and will review the chart to see how difficult it was to keep him in sinus, but I may well plan on discontinuing the amiodarone pending the results of my review and the above laboratory testing.
See result note on PFT's.  ES
This gentleman has some reduction in his DLCO's of a mild to moderate degree but he has pretty good blood gases with a good PO2. Nevertheless I would like to get him off of amiodarone anyway since his left atrial size is normal and I would suspect he is not going to be at high risk of recurrent atrial fib. Please see if you can find a pulmonary function test as a baseline before I make a final decision and let me know either way.  ES
Awake/Alert

## 2025-02-05 ENCOUNTER — OFFICE VISIT (OUTPATIENT)
Facility: CLINIC | Age: 65
End: 2025-02-05

## 2025-02-05 VITALS
HEIGHT: 70 IN | BODY MASS INDEX: 25.03 KG/M2 | TEMPERATURE: 99.3 F | WEIGHT: 174.8 LBS | RESPIRATION RATE: 16 BRPM | OXYGEN SATURATION: 99 % | SYSTOLIC BLOOD PRESSURE: 120 MMHG | DIASTOLIC BLOOD PRESSURE: 70 MMHG | HEART RATE: 80 BPM

## 2025-02-05 DIAGNOSIS — J11.1 INFLUENZA: ICD-10-CM

## 2025-02-05 DIAGNOSIS — I50.22 CHRONIC SYSTOLIC HEART FAILURE (HCC): ICD-10-CM

## 2025-02-05 DIAGNOSIS — I27.20 MODERATE TO SEVERE PULMONARY HYPERTENSION (HCC): ICD-10-CM

## 2025-02-05 DIAGNOSIS — I74.09 AORTOILIAC OCCLUSIVE DISEASE (HCC): ICD-10-CM

## 2025-02-05 DIAGNOSIS — I50.22 CHRONIC SYSTOLIC CONGESTIVE HEART FAILURE (HCC): ICD-10-CM

## 2025-02-05 DIAGNOSIS — I25.5 ISCHEMIC CARDIOMYOPATHY: ICD-10-CM

## 2025-02-05 DIAGNOSIS — I73.9 PAD (PERIPHERAL ARTERY DISEASE) (HCC): ICD-10-CM

## 2025-02-05 DIAGNOSIS — S98.131A AMPUTATION OF TOE OF RIGHT FOOT (HCC): ICD-10-CM

## 2025-02-05 DIAGNOSIS — E78.5 DYSLIPIDEMIA: ICD-10-CM

## 2025-02-05 DIAGNOSIS — I70.213 ATHEROSCLEROSIS OF NATIVE ARTERY OF BOTH LOWER EXTREMITIES WITH INTERMITTENT CLAUDICATION (HCC): ICD-10-CM

## 2025-02-05 DIAGNOSIS — Z89.612 STATUS POST ABOVE-KNEE AMPUTATION OF LEFT LOWER EXTREMITY (HCC): ICD-10-CM

## 2025-02-05 DIAGNOSIS — J18.9 PNEUMONIA OF RIGHT UPPER LOBE DUE TO INFECTIOUS ORGANISM: Primary | ICD-10-CM

## 2025-02-05 DIAGNOSIS — I48.0 PAROXYSMAL A-FIB (HCC): ICD-10-CM

## 2025-02-05 DIAGNOSIS — I25.10 CORONARY ARTERY DISEASE INVOLVING NATIVE CORONARY ARTERY OF NATIVE HEART WITHOUT ANGINA PECTORIS: ICD-10-CM

## 2025-02-05 PROBLEM — I50.9 CHRONIC CONGESTIVE HEART FAILURE (HCC): Status: RESOLVED | Noted: 2024-05-09 | Resolved: 2025-02-05

## 2025-02-05 PROBLEM — I72.4 PSEUDOANEURYSM OF FEMORAL ARTERY: Status: RESOLVED | Noted: 2017-06-27 | Resolved: 2025-02-05

## 2025-02-05 PROBLEM — R94.31 PROLONGED Q-T INTERVAL ON ECG: Status: RESOLVED | Noted: 2017-08-19 | Resolved: 2025-02-05

## 2025-02-05 PROBLEM — T82.898A: Status: RESOLVED | Noted: 2022-02-28 | Resolved: 2025-02-05

## 2025-02-05 PROBLEM — I99.8 ISCHEMIC REST PAIN OF LOWER EXTREMITY: Status: RESOLVED | Noted: 2017-08-14 | Resolved: 2025-02-05

## 2025-02-05 PROBLEM — M79.606 ISCHEMIC REST PAIN OF LOWER EXTREMITY: Status: RESOLVED | Noted: 2017-08-14 | Resolved: 2025-02-05

## 2025-02-05 PROBLEM — L97.419 CHRONIC ULCER OF RIGHT HEEL (HCC): Status: RESOLVED | Noted: 2017-08-08 | Resolved: 2025-02-05

## 2025-02-05 PROBLEM — D62 ACUTE BLOOD LOSS AS CAUSE OF POSTOPERATIVE ANEMIA: Status: RESOLVED | Noted: 2017-07-25 | Resolved: 2025-02-05

## 2025-02-05 PROBLEM — I70.201 OCCLUSION OF RIGHT FEMORAL ARTERY (HCC): Status: RESOLVED | Noted: 2021-03-31 | Resolved: 2025-02-05

## 2025-02-05 RX ORDER — FOLIC ACID 1 MG/1
1000 TABLET ORAL DAILY
COMMUNITY

## 2025-02-05 RX ORDER — METOPROLOL TARTRATE 75 MG/1
50 TABLET ORAL
COMMUNITY
End: 2025-02-05 | Stop reason: DRUGHIGH

## 2025-02-05 RX ORDER — MULTIVITAMIN,THERAPEUTIC
1 TABLET ORAL DAILY
COMMUNITY

## 2025-02-05 RX ORDER — METOPROLOL TARTRATE 50 MG
50 TABLET ORAL 3 TIMES DAILY
Qty: 270 TABLET | Refills: 0
Start: 2025-02-05

## 2025-02-05 RX ORDER — LANOLIN ALCOHOL/MO/W.PET/CERES
100 CREAM (GRAM) TOPICAL DAILY
COMMUNITY
Start: 2025-01-24

## 2025-02-05 RX ORDER — METOPROLOL SUCCINATE 50 MG/1
50 TABLET, EXTENDED RELEASE ORAL 3 TIMES DAILY
Qty: 270 TABLET | Refills: 0
Start: 2025-02-05 | End: 2025-02-05 | Stop reason: CLARIF

## 2025-02-05 SDOH — ECONOMIC STABILITY: FOOD INSECURITY: WITHIN THE PAST 12 MONTHS, THE FOOD YOU BOUGHT JUST DIDN'T LAST AND YOU DIDN'T HAVE MONEY TO GET MORE.: NEVER TRUE

## 2025-02-05 SDOH — ECONOMIC STABILITY: FOOD INSECURITY: WITHIN THE PAST 12 MONTHS, YOU WORRIED THAT YOUR FOOD WOULD RUN OUT BEFORE YOU GOT MONEY TO BUY MORE.: NEVER TRUE

## 2025-02-05 ASSESSMENT — PATIENT HEALTH QUESTIONNAIRE - PHQ9
SUM OF ALL RESPONSES TO PHQ QUESTIONS 1-9: 0
SUM OF ALL RESPONSES TO PHQ QUESTIONS 1-9: 0
1. LITTLE INTEREST OR PLEASURE IN DOING THINGS: NOT AT ALL
SUM OF ALL RESPONSES TO PHQ QUESTIONS 1-9: 0
SUM OF ALL RESPONSES TO PHQ QUESTIONS 1-9: 0
2. FEELING DOWN, DEPRESSED OR HOPELESS: NOT AT ALL
SUM OF ALL RESPONSES TO PHQ9 QUESTIONS 1 & 2: 0

## 2025-02-05 NOTE — PROGRESS NOTES
\"Have you been to the ER, urgent care clinic since your last visit?  Hospitalized since your last visit?\"    YES - When: approximately 3  weeks ago.  Where and Why[facility]    “Have you seen or consulted any other health care providers outside our system since your last visit?”    NO      “Have you had a colorectal cancer screening such as a colonoscopy/FIT/Cologuard?    NO    Date of last Colonoscopy: 7/23/2015  No cologuard on file  No FIT/FOBT on file   No flexible sigmoidoscopy on file          
Atherosclerosis of native artery of both lower extremities with intermittent claudication ([facility]) I70.213    Peripheral artery disease ([facility]) I73.9    Coronary artery disease involving native coronary artery of native heart I25.10    Atrial fibrillation ([facility]) I48.91    Acute blood loss as cause of postoperative anemia D62    Impaired mobility and ADLs Z74.09, Z78.9    Ischemic cardiomyopathy I25.5    Chronic systolic heart failure ([facility]) I50.22    Carotid artery disease ([facility]) I77.9    Dyslipidemia E78.5    Euthyroid sick syndrome E07.81    Aftercare following surgery of the circulatory system Z48.812    Status post femoral-popliteal bypass surgery Z95.828    History of cardioversion Z98.890    Critical ischemia of lower extremity ([facility]) I70.229    History of vitamin D deficiency Z86.39    Pseudoaneurysm of femoral artery ([facility]) I72.4    Infection of vascular bypass graft ([facility]) T82.7XXA    Chronic anemia D64.9    Chronic ulcer of right heel ([facility]) L97.419    Ischemic rest pain of lower extremity M79.606, I99.8    Prolonged Q-T interval on ECG R94.31    Status post above-knee amputation of left lower extremity ([facility]) Z89.612    Aftercare for amputation stump Z47.81    Moderate to severe pulmonary hypertension ([facility]) I27.20    Adverse effect of amiodarone T46.2X5A    Skin ulcer of malleolar area of right ankle ([facility]) L97.319    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[facility]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

## 2025-02-18 ENCOUNTER — OFFICE VISIT (OUTPATIENT)
Age: 65
End: 2025-02-18
Payer: MEDICARE

## 2025-02-18 VITALS
HEIGHT: 70 IN | BODY MASS INDEX: 27.49 KG/M2 | DIASTOLIC BLOOD PRESSURE: 80 MMHG | HEART RATE: 121 BPM | WEIGHT: 192 LBS | SYSTOLIC BLOOD PRESSURE: 108 MMHG | OXYGEN SATURATION: 98 %

## 2025-02-18 DIAGNOSIS — R06.02 SHORTNESS OF BREATH: ICD-10-CM

## 2025-02-18 DIAGNOSIS — I50.22 CHRONIC SYSTOLIC (CONGESTIVE) HEART FAILURE (HCC): ICD-10-CM

## 2025-02-18 DIAGNOSIS — E78.5 HYPERLIPIDEMIA, UNSPECIFIED HYPERLIPIDEMIA TYPE: ICD-10-CM

## 2025-02-18 DIAGNOSIS — I48.0 PAROXYSMAL ATRIAL FIBRILLATION (HCC): Primary | ICD-10-CM

## 2025-02-18 DIAGNOSIS — I10 ESSENTIAL (PRIMARY) HYPERTENSION: ICD-10-CM

## 2025-02-18 DIAGNOSIS — I25.10 ATHEROSCLEROSIS OF NATIVE CORONARY ARTERY WITHOUT ANGINA PECTORIS, UNSPECIFIED WHETHER NATIVE OR TRANSPLANTED HEART: ICD-10-CM

## 2025-02-18 PROCEDURE — 99214 OFFICE O/P EST MOD 30 MIN: CPT | Performed by: NURSE PRACTITIONER

## 2025-02-18 PROCEDURE — 1160F RVW MEDS BY RX/DR IN RCRD: CPT | Performed by: NURSE PRACTITIONER

## 2025-02-18 PROCEDURE — 1123F ACP DISCUSS/DSCN MKR DOCD: CPT | Performed by: NURSE PRACTITIONER

## 2025-02-18 PROCEDURE — 93000 ELECTROCARDIOGRAM COMPLETE: CPT | Performed by: NURSE PRACTITIONER

## 2025-02-18 PROCEDURE — 1159F MED LIST DOCD IN RCRD: CPT | Performed by: NURSE PRACTITIONER

## 2025-02-18 PROCEDURE — 3074F SYST BP LT 130 MM HG: CPT | Performed by: NURSE PRACTITIONER

## 2025-02-18 PROCEDURE — 1126F AMNT PAIN NOTED NONE PRSNT: CPT | Performed by: NURSE PRACTITIONER

## 2025-02-18 PROCEDURE — 3079F DIAST BP 80-89 MM HG: CPT | Performed by: NURSE PRACTITIONER

## 2025-02-18 RX ORDER — FUROSEMIDE 20 MG/1
40 TABLET ORAL 2 TIMES DAILY
Qty: 360 TABLET | Refills: 0 | OUTPATIENT
Start: 2025-02-18 | End: 2025-05-19

## 2025-02-18 RX ORDER — FUROSEMIDE 40 MG/1
40 TABLET ORAL 2 TIMES DAILY
COMMUNITY
Start: 2025-01-26 | End: 2025-02-18 | Stop reason: SDUPTHER

## 2025-02-18 RX ORDER — METOPROLOL TARTRATE 50 MG
50 TABLET ORAL 3 TIMES DAILY
Qty: 270 TABLET | Refills: 3 | Status: SHIPPED | OUTPATIENT
Start: 2025-02-18

## 2025-02-18 RX ORDER — FUROSEMIDE 40 MG/1
40 TABLET ORAL 2 TIMES DAILY
Qty: 180 TABLET | Refills: 3 | Status: SHIPPED | OUTPATIENT
Start: 2025-02-18

## 2025-02-18 ASSESSMENT — ENCOUNTER SYMPTOMS
VOMITING: 0
CONSTIPATION: 0
NAUSEA: 0
WHEEZING: 0
SHORTNESS OF BREATH: 1
ABDOMINAL DISTENTION: 0
BLOOD IN STOOL: 0
CHEST TIGHTNESS: 0
DIARRHEA: 0
COUGH: 0

## 2025-02-18 ASSESSMENT — PATIENT HEALTH QUESTIONNAIRE - PHQ9
SUM OF ALL RESPONSES TO PHQ9 QUESTIONS 1 & 2: 0
SUM OF ALL RESPONSES TO PHQ QUESTIONS 1-9: 0
2. FEELING DOWN, DEPRESSED OR HOPELESS: NOT AT ALL
1. LITTLE INTEREST OR PLEASURE IN DOING THINGS: NOT AT ALL

## 2025-02-18 NOTE — PROGRESS NOTES
Jorden Artis presents today for   Chief Complaint   Patient presents with    Follow-Up from Hospital     Hosp f/u 1/21-1/26       Jorden Artis preferred language for health care discussion is english/other.    Is someone accompanying this pt? no    Is the patient using any DME equipment during OV? yes    Depression Screening:  Depression: Not at risk (2/18/2025)    PHQ-2     PHQ-2 Score: 0        Learning Assessment:  Who is the primary learner? Patient    What is the preferred language for health care of the primary learner? ENGLISH    How does the primary learner prefer to learn new concepts? DEMONSTRATION    Answered By patient    Relationship to Learner SELF           Pt currently taking Anticoagulant therapy? ASA 81 MG QD     Pt currently taking Antiplatelet therapy ? ELIQUIS 5 MG BID       Coordination of Care:  1. Have you been to the ER, urgent care clinic since your last visit? Hospitalized since your last visit? yes    2. Have you seen or consulted any other health care providers outside of the [facility] since your last visit? Include any pap smears or colon screening. no    
heart rate and blood pressure.  Digoxin may need to be added if heart rate remains elevated and suboptimally controlled.  If blood pressure is stable, will initiate medications for GDMT.     He is aware that his EF has decreased from 50-55% in May 2024 to < 25% in Jan. 2025.      Congestive heart failure teaching reinforced today.  Advised to limit sodium intake to no more than 2000mg per day and to also limit fluid intake to 48 ounces per day (unless otherwise specified).  Advised to weigh daily every morning and record weights.  Instructed to call our office if progressive weight gain is noted over a 2 to 3 day period of time, shortness of breath increases, or if abdominal bloating, nausea, fatigue, or increased lower extremity edema is noted.     Time:  35 minutes (Review of hospital records, teaching, answering questions, providing reassurance).      He will follow-up with Dr. Valenzuela as scheduled and as needed.       Rin Cochran MSN, FNP-BC    Please note:  Portions of this chart were created with Dragon medical speech to text program.  Unrecognized errors may be present.

## 2025-02-18 NOTE — PATIENT INSTRUCTIONS
Continue present medication regimen  Follow-up with Rin as scheduled and as needed  Follow-up with Dr. Valenzuela as scheduled and as needed

## 2025-02-26 ENCOUNTER — ANESTHESIA EVENT (OUTPATIENT)
Dept: CARDIOTHORACIC SURGERY | Facility: HOSPITAL | Age: 65
End: 2025-02-26
Payer: MEDICARE

## 2025-02-26 ASSESSMENT — PAIN - FUNCTIONAL ASSESSMENT: PAIN_FUNCTIONAL_ASSESSMENT: 0-10

## 2025-02-27 ENCOUNTER — HOSPITAL ENCOUNTER (INPATIENT)
Facility: HOSPITAL | Age: 65
LOS: 12 days | Discharge: HOME | DRG: 253 | End: 2025-03-11
Attending: SURGERY | Admitting: SURGERY
Payer: MEDICARE

## 2025-02-27 ENCOUNTER — APPOINTMENT (OUTPATIENT)
Facility: HOSPITAL | Age: 65
DRG: 253 | End: 2025-02-27
Attending: SURGERY
Payer: MEDICARE

## 2025-02-27 ENCOUNTER — ANESTHESIA (OUTPATIENT)
Dept: CARDIOTHORACIC SURGERY | Facility: HOSPITAL | Age: 65
End: 2025-02-27
Payer: MEDICARE

## 2025-02-27 DIAGNOSIS — I42.9 CARDIOMYOPATHY, UNSPECIFIED TYPE (HCC): Primary | ICD-10-CM

## 2025-02-27 DIAGNOSIS — I73.9 PAD (PERIPHERAL ARTERY DISEASE): ICD-10-CM

## 2025-02-27 PROBLEM — I70.201 OCCLUSION OF RIGHT FEMORAL ARTERY: Status: ACTIVE | Noted: 2025-02-27

## 2025-02-27 LAB
AMPHET UR QL SCN: NEGATIVE
ANION GAP SERPL CALC-SCNC: 8 MMOL/L (ref 3–18)
BARBITURATES UR QL SCN: NEGATIVE
BENZODIAZ UR QL: NEGATIVE
BUN SERPL-MCNC: 13 MG/DL (ref 7–18)
BUN/CREAT SERPL: 12 (ref 12–20)
CALCIUM SERPL-MCNC: 8.9 MG/DL (ref 8.5–10.1)
CANNABINOIDS UR QL SCN: POSITIVE
CHLORIDE SERPL-SCNC: 106 MMOL/L (ref 100–111)
CO2 SERPL-SCNC: 26 MMOL/L (ref 21–32)
COCAINE UR QL SCN: NEGATIVE
CREAT SERPL-MCNC: 1.06 MG/DL (ref 0.6–1.3)
GLUCOSE BLD STRIP.AUTO-MCNC: 123 MG/DL (ref 70–110)
GLUCOSE SERPL-MCNC: 115 MG/DL (ref 74–99)
Lab: ABNORMAL
METHADONE UR QL: NEGATIVE
OPIATES UR QL: NEGATIVE
PCP UR QL: NEGATIVE
POTASSIUM SERPL-SCNC: 3.6 MMOL/L (ref 3.5–5.5)
SODIUM SERPL-SCNC: 140 MMOL/L (ref 136–145)

## 2025-02-27 PROCEDURE — 3600000002 HC SURGERY LEVEL 2 BASE: Performed by: SURGERY

## 2025-02-27 PROCEDURE — 6360000002 HC RX W HCPCS: Performed by: NURSE ANESTHETIST, CERTIFIED REGISTERED

## 2025-02-27 PROCEDURE — 2700000000 HC OXYGEN THERAPY PER DAY

## 2025-02-27 PROCEDURE — 94761 N-INVAS EAR/PLS OXIMETRY MLT: CPT

## 2025-02-27 PROCEDURE — 99223 1ST HOSP IP/OBS HIGH 75: CPT | Performed by: INTERNAL MEDICINE

## 2025-02-27 PROCEDURE — 3600000012 HC SURGERY LEVEL 2 ADDTL 15MIN: Performed by: SURGERY

## 2025-02-27 PROCEDURE — 6360000002 HC RX W HCPCS: Performed by: SURGERY

## 2025-02-27 PROCEDURE — 2140000001 HC CVICU INTERMEDIATE R&B

## 2025-02-27 PROCEDURE — 2500000003 HC RX 250 WO HCPCS: Performed by: SURGERY

## 2025-02-27 PROCEDURE — 3700000001 HC ADD 15 MINUTES (ANESTHESIA): Performed by: SURGERY

## 2025-02-27 PROCEDURE — 6370000000 HC RX 637 (ALT 250 FOR IP): Performed by: NURSE ANESTHETIST, CERTIFIED REGISTERED

## 2025-02-27 PROCEDURE — 2709999900 HC NON-CHARGEABLE SUPPLY: Performed by: SURGERY

## 2025-02-27 PROCEDURE — 3700000000 HC ANESTHESIA ATTENDED CARE: Performed by: SURGERY

## 2025-02-27 PROCEDURE — 6360000004 HC RX CONTRAST MEDICATION: Performed by: SURGERY

## 2025-02-27 PROCEDURE — 04CP0ZZ EXTIRPATION OF MATTER FROM RIGHT ANTERIOR TIBIAL ARTERY, OPEN APPROACH: ICD-10-PCS | Performed by: SURGERY

## 2025-02-27 PROCEDURE — C1894 INTRO/SHEATH, NON-LASER: HCPCS | Performed by: SURGERY

## 2025-02-27 PROCEDURE — 2500000003 HC RX 250 WO HCPCS: Performed by: NURSE ANESTHETIST, CERTIFIED REGISTERED

## 2025-02-27 PROCEDURE — C1725 CATH, TRANSLUMIN NON-LASER: HCPCS | Performed by: SURGERY

## 2025-02-27 PROCEDURE — 6360000004 HC RX CONTRAST MEDICATION

## 2025-02-27 PROCEDURE — 2580000003 HC RX 258: Performed by: NURSE ANESTHETIST, CERTIFIED REGISTERED

## 2025-02-27 PROCEDURE — 2580000003 HC RX 258: Performed by: SURGERY

## 2025-02-27 PROCEDURE — 2500000003 HC RX 250 WO HCPCS

## 2025-02-27 PROCEDURE — 6370000000 HC RX 637 (ALT 250 FOR IP): Performed by: SURGERY

## 2025-02-27 PROCEDURE — C8924 2D TTE W OR W/O FOL W/CON,FU: HCPCS

## 2025-02-27 PROCEDURE — 80307 DRUG TEST PRSMV CHEM ANLYZR: CPT

## 2025-02-27 PROCEDURE — 80048 BASIC METABOLIC PNL TOTAL CA: CPT

## 2025-02-27 PROCEDURE — 6370000000 HC RX 637 (ALT 250 FOR IP)

## 2025-02-27 PROCEDURE — 047P3ZZ DILATION OF RIGHT ANTERIOR TIBIAL ARTERY, PERCUTANEOUS APPROACH: ICD-10-PCS | Performed by: SURGERY

## 2025-02-27 PROCEDURE — 82962 GLUCOSE BLOOD TEST: CPT

## 2025-02-27 PROCEDURE — C1769 GUIDE WIRE: HCPCS | Performed by: SURGERY

## 2025-02-27 PROCEDURE — 6360000002 HC RX W HCPCS

## 2025-02-27 PROCEDURE — 2720000010 HC SURG SUPPLY STERILE: Performed by: SURGERY

## 2025-02-27 PROCEDURE — B41F1ZZ FLUOROSCOPY OF RIGHT LOWER EXTREMITY ARTERIES USING LOW OSMOLAR CONTRAST: ICD-10-PCS | Performed by: SURGERY

## 2025-02-27 PROCEDURE — 047K3ZZ DILATION OF RIGHT FEMORAL ARTERY, PERCUTANEOUS APPROACH: ICD-10-PCS | Performed by: SURGERY

## 2025-02-27 PROCEDURE — 3E03317 INTRODUCTION OF OTHER THROMBOLYTIC INTO PERIPHERAL VEIN, PERCUTANEOUS APPROACH: ICD-10-PCS | Performed by: SURGERY

## 2025-02-27 PROCEDURE — C1757 CATH, THROMBECTOMY/EMBOLECT: HCPCS | Performed by: SURGERY

## 2025-02-27 RX ORDER — DEXTROSE MONOHYDRATE AND SODIUM CHLORIDE 5; .45 G/100ML; G/100ML
INJECTION, SOLUTION INTRAVENOUS CONTINUOUS
Status: DISCONTINUED | OUTPATIENT
Start: 2025-02-27 | End: 2025-03-07

## 2025-02-27 RX ORDER — ONDANSETRON 2 MG/ML
4 INJECTION INTRAMUSCULAR; INTRAVENOUS EVERY 6 HOURS PRN
Status: DISCONTINUED | OUTPATIENT
Start: 2025-02-27 | End: 2025-03-11 | Stop reason: HOSPADM

## 2025-02-27 RX ORDER — HEPARIN SODIUM 200 [USP'U]/100ML
INJECTION, SOLUTION INTRAVENOUS CONTINUOUS PRN
Status: COMPLETED | OUTPATIENT
Start: 2025-02-27 | End: 2025-02-27

## 2025-02-27 RX ORDER — SODIUM CHLORIDE 0.9 % (FLUSH) 0.9 %
5-40 SYRINGE (ML) INJECTION EVERY 12 HOURS SCHEDULED
Status: DISCONTINUED | OUTPATIENT
Start: 2025-02-27 | End: 2025-03-11 | Stop reason: HOSPADM

## 2025-02-27 RX ORDER — KETAMINE HCL 50MG/ML(1)
SYRINGE (ML) INTRAVENOUS
Status: DISCONTINUED | OUTPATIENT
Start: 2025-02-27 | End: 2025-02-27 | Stop reason: SDUPTHER

## 2025-02-27 RX ORDER — FAMOTIDINE 20 MG/1
20 TABLET, FILM COATED ORAL ONCE
Status: COMPLETED | OUTPATIENT
Start: 2025-02-27 | End: 2025-02-27

## 2025-02-27 RX ORDER — DIGOXIN 0.25 MG/ML
250 INJECTION INTRAMUSCULAR; INTRAVENOUS EVERY 6 HOURS
Status: COMPLETED | OUTPATIENT
Start: 2025-02-27 | End: 2025-02-27

## 2025-02-27 RX ORDER — ETOMIDATE 2 MG/ML
INJECTION INTRAVENOUS
Status: DISCONTINUED | OUTPATIENT
Start: 2025-02-27 | End: 2025-02-27 | Stop reason: SDUPTHER

## 2025-02-27 RX ORDER — SODIUM CHLORIDE, SODIUM LACTATE, POTASSIUM CHLORIDE, CALCIUM CHLORIDE 600; 310; 30; 20 MG/100ML; MG/100ML; MG/100ML; MG/100ML
INJECTION, SOLUTION INTRAVENOUS CONTINUOUS
Status: DISCONTINUED | OUTPATIENT
Start: 2025-02-27 | End: 2025-02-27 | Stop reason: HOSPADM

## 2025-02-27 RX ORDER — SODIUM CHLORIDE 0.9 % (FLUSH) 0.9 %
5-40 SYRINGE (ML) INJECTION PRN
Status: DISCONTINUED | OUTPATIENT
Start: 2025-02-27 | End: 2025-03-11 | Stop reason: HOSPADM

## 2025-02-27 RX ORDER — SODIUM CHLORIDE 0.9 % (FLUSH) 0.9 %
5-40 SYRINGE (ML) INJECTION PRN
Status: DISCONTINUED | OUTPATIENT
Start: 2025-02-27 | End: 2025-02-27 | Stop reason: HOSPADM

## 2025-02-27 RX ORDER — HEPARIN SODIUM 1000 [USP'U]/ML
INJECTION, SOLUTION INTRAVENOUS; SUBCUTANEOUS
Status: DISCONTINUED | OUTPATIENT
Start: 2025-02-27 | End: 2025-02-27 | Stop reason: SDUPTHER

## 2025-02-27 RX ORDER — IODIXANOL 320 MG/ML
INJECTION, SOLUTION INTRAVASCULAR
Status: DISPENSED
Start: 2025-02-27 | End: 2025-02-27

## 2025-02-27 RX ORDER — HEPARIN SODIUM 1000 [USP'U]/ML
80 INJECTION, SOLUTION INTRAVENOUS; SUBCUTANEOUS PRN
Status: DISCONTINUED | OUTPATIENT
Start: 2025-02-27 | End: 2025-03-05 | Stop reason: SDUPTHER

## 2025-02-27 RX ORDER — SODIUM CHLORIDE 0.9 % (FLUSH) 0.9 %
5-40 SYRINGE (ML) INJECTION EVERY 12 HOURS SCHEDULED
Status: DISCONTINUED | OUTPATIENT
Start: 2025-02-27 | End: 2025-02-27 | Stop reason: HOSPADM

## 2025-02-27 RX ORDER — WATER 10 ML/10ML
INJECTION INTRAMUSCULAR; INTRAVENOUS; SUBCUTANEOUS
Status: DISPENSED
Start: 2025-02-27 | End: 2025-02-27

## 2025-02-27 RX ORDER — ONDANSETRON 2 MG/ML
INJECTION INTRAMUSCULAR; INTRAVENOUS
Status: DISCONTINUED | OUTPATIENT
Start: 2025-02-27 | End: 2025-02-27 | Stop reason: SDUPTHER

## 2025-02-27 RX ORDER — METOPROLOL TARTRATE 50 MG
50 TABLET ORAL 3 TIMES DAILY
Status: DISCONTINUED | OUTPATIENT
Start: 2025-02-27 | End: 2025-02-27

## 2025-02-27 RX ORDER — ONDANSETRON 4 MG/1
4 TABLET, ORALLY DISINTEGRATING ORAL EVERY 8 HOURS PRN
Status: DISCONTINUED | OUTPATIENT
Start: 2025-02-27 | End: 2025-03-11 | Stop reason: HOSPADM

## 2025-02-27 RX ORDER — PHENYLEPHRINE HYDROCHLORIDE 10 MG/ML
INJECTION INTRAVENOUS
Status: DISPENSED
Start: 2025-02-27 | End: 2025-02-27

## 2025-02-27 RX ORDER — IODIXANOL 320 MG/ML
INJECTION, SOLUTION INTRAVASCULAR PRN
Status: DISCONTINUED | OUTPATIENT
Start: 2025-02-27 | End: 2025-02-27 | Stop reason: ALTCHOICE

## 2025-02-27 RX ORDER — FENTANYL CITRATE 50 UG/ML
INJECTION, SOLUTION INTRAMUSCULAR; INTRAVENOUS
Status: DISCONTINUED | OUTPATIENT
Start: 2025-02-27 | End: 2025-02-27 | Stop reason: SDUPTHER

## 2025-02-27 RX ORDER — FUROSEMIDE 40 MG/1
40 TABLET ORAL 2 TIMES DAILY
Status: DISCONTINUED | OUTPATIENT
Start: 2025-02-27 | End: 2025-02-28

## 2025-02-27 RX ORDER — HEPARIN SODIUM 1000 [USP'U]/ML
40 INJECTION, SOLUTION INTRAVENOUS; SUBCUTANEOUS PRN
Status: DISCONTINUED | OUTPATIENT
Start: 2025-02-27 | End: 2025-03-05 | Stop reason: SDUPTHER

## 2025-02-27 RX ORDER — CILOSTAZOL 50 MG/1
50 TABLET ORAL 2 TIMES DAILY
Status: DISCONTINUED | OUTPATIENT
Start: 2025-02-27 | End: 2025-03-11 | Stop reason: HOSPADM

## 2025-02-27 RX ORDER — HEPARIN SODIUM 200 [USP'U]/100ML
INJECTION, SOLUTION INTRAVENOUS
Status: DISPENSED
Start: 2025-02-27 | End: 2025-02-27

## 2025-02-27 RX ORDER — METOPROLOL TARTRATE 1 MG/ML
INJECTION, SOLUTION INTRAVENOUS
Status: DISCONTINUED | OUTPATIENT
Start: 2025-02-27 | End: 2025-02-27 | Stop reason: SDUPTHER

## 2025-02-27 RX ORDER — ESMOLOL HYDROCHLORIDE 10 MG/ML
INJECTION INTRAVENOUS
Status: DISCONTINUED | OUTPATIENT
Start: 2025-02-27 | End: 2025-02-27 | Stop reason: SDUPTHER

## 2025-02-27 RX ORDER — DEXAMETHASONE SODIUM PHOSPHATE 4 MG/ML
INJECTION, SOLUTION INTRA-ARTICULAR; INTRALESIONAL; INTRAMUSCULAR; INTRAVENOUS; SOFT TISSUE
Status: DISCONTINUED | OUTPATIENT
Start: 2025-02-27 | End: 2025-02-27 | Stop reason: SDUPTHER

## 2025-02-27 RX ORDER — HYDRALAZINE HYDROCHLORIDE 20 MG/ML
10 INJECTION INTRAMUSCULAR; INTRAVENOUS EVERY 4 HOURS PRN
Status: DISCONTINUED | OUTPATIENT
Start: 2025-02-27 | End: 2025-03-11 | Stop reason: HOSPADM

## 2025-02-27 RX ORDER — SODIUM CHLORIDE 9 MG/ML
INJECTION, SOLUTION INTRAVENOUS PRN
Status: DISCONTINUED | OUTPATIENT
Start: 2025-02-27 | End: 2025-02-27 | Stop reason: HOSPADM

## 2025-02-27 RX ORDER — HEPARIN SODIUM 1000 [USP'U]/ML
80 INJECTION, SOLUTION INTRAVENOUS; SUBCUTANEOUS ONCE
Status: DISCONTINUED | OUTPATIENT
Start: 2025-02-27 | End: 2025-02-27

## 2025-02-27 RX ORDER — HEPARIN SODIUM 10000 [USP'U]/100ML
5-30 INJECTION, SOLUTION INTRAVENOUS CONTINUOUS
Status: DISCONTINUED | OUTPATIENT
Start: 2025-02-27 | End: 2025-02-27

## 2025-02-27 RX ORDER — CEFAZOLIN SODIUM 1 G/3ML
INJECTION, POWDER, FOR SOLUTION INTRAMUSCULAR; INTRAVENOUS
Status: DISCONTINUED | OUTPATIENT
Start: 2025-02-27 | End: 2025-02-27 | Stop reason: SDUPTHER

## 2025-02-27 RX ORDER — METOPROLOL TARTRATE 50 MG
50 TABLET ORAL 3 TIMES DAILY
Status: DISCONTINUED | OUTPATIENT
Start: 2025-02-27 | End: 2025-02-28

## 2025-02-27 RX ORDER — SODIUM CHLORIDE 9 MG/ML
INJECTION, SOLUTION INTRAVENOUS PRN
Status: DISCONTINUED | OUTPATIENT
Start: 2025-02-27 | End: 2025-03-11 | Stop reason: HOSPADM

## 2025-02-27 RX ORDER — METOPROLOL TARTRATE 1 MG/ML
5 INJECTION, SOLUTION INTRAVENOUS ONCE
Status: COMPLETED | OUTPATIENT
Start: 2025-02-27 | End: 2025-02-27

## 2025-02-27 RX ORDER — ROCURONIUM BROMIDE 10 MG/ML
INJECTION, SOLUTION INTRAVENOUS
Status: DISCONTINUED | OUTPATIENT
Start: 2025-02-27 | End: 2025-02-27 | Stop reason: SDUPTHER

## 2025-02-27 RX ORDER — ASPIRIN 81 MG/1
81 TABLET ORAL DAILY
Status: DISCONTINUED | OUTPATIENT
Start: 2025-02-27 | End: 2025-03-11 | Stop reason: HOSPADM

## 2025-02-27 RX ORDER — HEPARIN SODIUM 10000 [USP'U]/100ML
5-30 INJECTION, SOLUTION INTRAVENOUS CONTINUOUS
Status: DISCONTINUED | OUTPATIENT
Start: 2025-02-27 | End: 2025-03-05 | Stop reason: SDUPTHER

## 2025-02-27 RX ADMIN — WATER 2000 MG: 1 INJECTION INTRAMUSCULAR; INTRAVENOUS; SUBCUTANEOUS at 16:04

## 2025-02-27 RX ADMIN — Medication 25 MG: at 08:24

## 2025-02-27 RX ADMIN — ETOMIDATE INJECTION 10 MG: 2 SOLUTION INTRAVENOUS at 07:53

## 2025-02-27 RX ADMIN — HYDROMORPHONE HYDROCHLORIDE 0.5 MG: 1 INJECTION, SOLUTION INTRAMUSCULAR; INTRAVENOUS; SUBCUTANEOUS at 20:07

## 2025-02-27 RX ADMIN — METOPROLOL TARTRATE 50 MG: 50 TABLET, FILM COATED ORAL at 20:07

## 2025-02-27 RX ADMIN — DEXTROSE AND SODIUM CHLORIDE: 5; 450 INJECTION, SOLUTION INTRAVENOUS at 20:59

## 2025-02-27 RX ADMIN — ASPIRIN 81 MG: 81 TABLET, COATED ORAL at 10:40

## 2025-02-27 RX ADMIN — METOPROLOL TARTRATE 2.5 MG: 1 INJECTION, SOLUTION INTRAVENOUS at 08:24

## 2025-02-27 RX ADMIN — FAMOTIDINE 20 MG: 20 TABLET, FILM COATED ORAL at 06:40

## 2025-02-27 RX ADMIN — SODIUM CHLORIDE, PRESERVATIVE FREE 10 ML: 5 INJECTION INTRAVENOUS at 10:16

## 2025-02-27 RX ADMIN — PREGABALIN 75 MG: 25 CAPSULE ORAL at 10:40

## 2025-02-27 RX ADMIN — HEPARIN SODIUM 18 UNITS/KG/HR: 10000 INJECTION, SOLUTION INTRAVENOUS at 20:24

## 2025-02-27 RX ADMIN — METOPROLOL TARTRATE 50 MG: 50 TABLET, FILM COATED ORAL at 14:18

## 2025-02-27 RX ADMIN — HYDROMORPHONE HYDROCHLORIDE 0.5 MG: 1 INJECTION, SOLUTION INTRAMUSCULAR; INTRAVENOUS; SUBCUTANEOUS at 16:12

## 2025-02-27 RX ADMIN — FENTANYL CITRATE 50 MCG: 50 INJECTION INTRAMUSCULAR; INTRAVENOUS at 09:01

## 2025-02-27 RX ADMIN — SODIUM CHLORIDE: 9 INJECTION, SOLUTION INTRAVENOUS at 07:58

## 2025-02-27 RX ADMIN — VASOPRESSIN 1 ML: 20 INJECTION INTRAVENOUS at 09:07

## 2025-02-27 RX ADMIN — Medication 25 MG: at 07:53

## 2025-02-27 RX ADMIN — HEPARIN SODIUM 5000 UNITS: 1000 INJECTION, SOLUTION INTRAVENOUS; SUBCUTANEOUS at 08:36

## 2025-02-27 RX ADMIN — ESMOLOL HYDROCHLORIDE 40 MG: 10 INJECTION, SOLUTION INTRAVENOUS at 07:53

## 2025-02-27 RX ADMIN — ESMOLOL HYDROCHLORIDE 20 MG: 10 INJECTION, SOLUTION INTRAVENOUS at 08:03

## 2025-02-27 RX ADMIN — SODIUM CHLORIDE, PRESERVATIVE FREE 10 ML: 5 INJECTION INTRAVENOUS at 20:28

## 2025-02-27 RX ADMIN — CEFAZOLIN 2 G: 330 INJECTION, POWDER, FOR SOLUTION INTRAMUSCULAR; INTRAVENOUS at 08:26

## 2025-02-27 RX ADMIN — SODIUM CHLORIDE, POTASSIUM CHLORIDE, SODIUM LACTATE AND CALCIUM CHLORIDE: 600; 310; 30; 20 INJECTION, SOLUTION INTRAVENOUS at 06:40

## 2025-02-27 RX ADMIN — DEXTROSE AND SODIUM CHLORIDE: 5; 450 INJECTION, SOLUTION INTRAVENOUS at 10:08

## 2025-02-27 RX ADMIN — CILOSTAZOL 50 MG: 50 TABLET ORAL at 20:07

## 2025-02-27 RX ADMIN — VASOPRESSIN 1 ML: 20 INJECTION INTRAVENOUS at 09:12

## 2025-02-27 RX ADMIN — ONDANSETRON 4 MG: 2 INJECTION INTRAMUSCULAR; INTRAVENOUS at 08:15

## 2025-02-27 RX ADMIN — ESMOLOL HYDROCHLORIDE 40 MG: 10 INJECTION, SOLUTION INTRAVENOUS at 07:59

## 2025-02-27 RX ADMIN — SULFUR HEXAFLUORIDE 2 ML: KIT at 15:04

## 2025-02-27 RX ADMIN — ROCURONIUM BROMIDE 50 MG: 50 INJECTION INTRAVENOUS at 07:53

## 2025-02-27 RX ADMIN — METOPROLOL TARTRATE 50 MG: 50 TABLET, FILM COATED ORAL at 10:40

## 2025-02-27 RX ADMIN — FUROSEMIDE 40 MG: 40 TABLET ORAL at 10:40

## 2025-02-27 RX ADMIN — METOPROLOL TARTRATE 5 MG: 5 INJECTION INTRAVENOUS at 10:14

## 2025-02-27 RX ADMIN — VASOPRESSIN 1 ML: 20 INJECTION INTRAVENOUS at 08:46

## 2025-02-27 RX ADMIN — DEXAMETHASONE SODIUM PHOSPHATE 4 MG: 4 INJECTION, SOLUTION INTRAMUSCULAR; INTRAVENOUS at 07:58

## 2025-02-27 RX ADMIN — DIGOXIN 250 MCG: 0.25 INJECTION INTRAMUSCULAR; INTRAVENOUS at 13:23

## 2025-02-27 RX ADMIN — FUROSEMIDE 40 MG: 40 TABLET ORAL at 20:06

## 2025-02-27 RX ADMIN — DIGOXIN 250 MCG: 0.25 INJECTION INTRAMUSCULAR; INTRAVENOUS at 20:07

## 2025-02-27 RX ADMIN — SUGAMMADEX 200 MG: 100 INJECTION, SOLUTION INTRAVENOUS at 09:25

## 2025-02-27 RX ADMIN — METOPROLOL TARTRATE 2.5 MG: 1 INJECTION, SOLUTION INTRAVENOUS at 08:16

## 2025-02-27 RX ADMIN — APIXABAN 5 MG: 5 TABLET, FILM COATED ORAL at 10:40

## 2025-02-27 RX ADMIN — PREGABALIN 75 MG: 25 CAPSULE ORAL at 20:07

## 2025-02-27 ASSESSMENT — PAIN SCALES - GENERAL
PAINLEVEL_OUTOF10: 0
PAINLEVEL_OUTOF10: 7
PAINLEVEL_OUTOF10: 2
PAINLEVEL_OUTOF10: 0
PAINLEVEL_OUTOF10: 6

## 2025-02-27 ASSESSMENT — PAIN DESCRIPTION - ORIENTATION
ORIENTATION: RIGHT
ORIENTATION: RIGHT

## 2025-02-27 ASSESSMENT — PAIN DESCRIPTION - DESCRIPTORS: DESCRIPTORS: TINGLING;THROBBING

## 2025-02-27 ASSESSMENT — PAIN DESCRIPTION - PAIN TYPE: TYPE: SURGICAL PAIN

## 2025-02-27 ASSESSMENT — PAIN - FUNCTIONAL ASSESSMENT: PAIN_FUNCTIONAL_ASSESSMENT: ACTIVITIES ARE NOT PREVENTED

## 2025-02-27 ASSESSMENT — PAIN DESCRIPTION - LOCATION
LOCATION: FOOT
LOCATION: FOOT

## 2025-02-27 ASSESSMENT — PAIN DESCRIPTION - ONSET: ONSET: PROGRESSIVE

## 2025-02-27 ASSESSMENT — PAIN DESCRIPTION - FREQUENCY: FREQUENCY: INTERMITTENT

## 2025-02-27 NOTE — H&P
Allergen Reactions    Morphine Other (See Comments)     Patient gets confused with morphine.         Review of Systems:    A comprehensive review of systems was negative except for that written in the History of Present Illness.    Objective:     No data found.    Temp (24hrs), Av.6 °F (37 °C), Min:98.6 °F (37 °C), Max:98.6 °F (37 °C)      Physical Exam:  Alert comfortable pleasant  Head is normocephalic  Neck no JVD  Chest is clear  Cardiac is regular  Abdomen soft  Palpable bypass graft on his right leg with no pulse  Able to move foot but ischemic discoloration notable    Labs:   Recent Results (from the past 24 hour(s))   Urine Drug Screen    Collection Time: 25  5:57 AM   Result Value Ref Range    Benzodiazepines, Urine Negative NEG      Barbiturates, Urine Negative NEG      THC, TH-Cannabinol, Urine Positive (A) NEG      Opiates, Urine Negative NEG      Phencyclidine, Urine Negative NEG      Cocaine, Urine Negative NEG      Amphetamine, Urine Negative NEG      Methadone, Urine Negative NEG      Comments: (NOTE)    Basic Metabolic Panel    Collection Time: 25  6:32 AM   Result Value Ref Range    Sodium 140 136 - 145 mmol/L    Potassium 3.6 3.5 - 5.5 mmol/L    Chloride 106 100 - 111 mmol/L    CO2 26 21 - 32 mmol/L    Anion Gap 8 3.0 - 18 mmol/L    Glucose 115 (H) 74 - 99 mg/dL    BUN 13 7.0 - 18 MG/DL    Creatinine 1.06 0.6 - 1.3 MG/DL    BUN/Creatinine Ratio 12 12 - 20      Est, Glom Filt Rate 77 >60 ml/min/1.73m2    Calcium 8.9 8.5 - 10.1 MG/DL   POCT Glucose    Collection Time: 25  6:32 AM   Result Value Ref Range    POC Glucose 123 (H) 70 - 110 mg/dL       Data Review: Labs reviewed    Assessment:     Principal Problem:    Occlusion of right femoral artery  Resolved Problems:    * No resolved hospital problems. *      Plan:     Plan for thrombectomy revision hopefully rescue of this bypass graft    Signed By: Ayad Mccormack MD     2025

## 2025-02-27 NOTE — CONSULTS
[facility] - Consult Note    Cardiology consultation request from Dr. Sesay for evaluation and management/treatment of AF with RVR    Date of  Admission: 2/27/2025  5:48 AM   Primary Care Physician:  Neeta Park MD    Attending Cardiologist: Hugh Valenzuela       Assessment:     - S/p thrombectomy and revision of right leg bypass graft with vascular surgery. Hx of femoral to tibial cadaver vein bypass graft on the right.  - AF with RVR with history of  longstanding persistent AF. Will give a 1x dose of IV lopressor. On outpatient PO lopressor. On Eliquis for AC as outpatient.  - CAD s/p LCx and RCA PCI 2017. Low risk NST 6/2023. On asa as outpatient  - Chronic HFrEF:  Echo 1/2025 EF < 25%, CYNDI.  EF decreased from 50-55% on Echo 5/2024, has been as low as 30% in the past. Does not appear volume overloaded. On PO lasix 40mg daily.  - PAD with previous left AKA.  - Hypertension: BPs normotensive.  - Hyperlipidemia. On statin.     Primary cardiologist: Dr. Valenzuela       Plan:     Addendum: Independent seen and evaluated.  Agree with below.  He tolerated his vascular procedure.  He is in atrial fibrillation with RVR.  He has CAF.  Agree with continuing to control rate.  Will utilize beta-blockers, digoxin as well as potentially amiodarone as needed.  Will repeat echocardiogram once his heart rate has improved    - Will give a 1x dose of IV lopressor 5mg BID  - Continue PO lopressor 50mg TID  - Eliquis for OAC  - Will repeat Echocardiogram once his heart rates are better controlled  - Will optimize GDMT pending echo results, once heart rates are better controlled, and as BP allows.       History of Present Illness:     This is a 64 y.o. male admitted for Atheroscler nonbiologic bypass graft right leg w/intermit claudication [I70.611]  Occlusion of right femoral artery [I70.201].    Patient with pmhx of PAD, longstanding persistent AF on Eliquis, CAD with stents, Hx of nonischemic cardiomyopathy, HTN, HLD

## 2025-02-27 NOTE — ANESTHESIA PRE PROCEDURE
Department of Anesthesiology  Preprocedure Note       Name:  Jorden Artis   Age:  64 y.o.  :  1960                                          MRN:  [id number]         Date:  2025      Surgeon: Surgeon(s):  Ayad Sesay MD    Procedure: Procedure(s):  RIGHT LEG BYPASS THROMBECTOMY; POSSIBLE REVISION    Medications prior to admission:   Prior to Admission medications    Medication Sig Start Date End Date Taking? Authorizing Provider   metoprolol tartrate (LOPRESSOR) 50 MG tablet Take 1 tablet by mouth in the morning, at noon, and at bedtime 25  Yes Rin Cochran APRN - NP   furosemide (LASIX) 40 MG tablet Take 1 tablet by mouth 2 times daily 25  Yes Rin Cochran APRN - NP   pregabalin (LYRICA) 75 MG capsule Take 1 capsule by mouth 2 times daily. 23  Yes Provider, MD Jeromy   Multiple Vitamin (ONE-A-DAY 55 PLUS PO) Take 1 tablet by mouth daily   Yes Provider, MD Jeromy   apixaban (ELIQUIS) 5 MG TABS tablet Take 1 tablet by mouth 2 times daily 3/16/22  Yes Automatic Reconciliation, Ar   cilostazol (PLETAL) 50 MG tablet Take 1 tablet by mouth 2 times daily 3/13/22  Yes Automatic Reconciliation, Ar   aspirin 81 MG EC tablet Take 1 tablet by mouth daily 4/10/20   Automatic Reconciliation, Ar       Current medications:    Current Facility-Administered Medications   Medication Dose Route Frequency Provider Last Rate Last Admin   • lactated ringers infusion   IntraVENous Continuous Kay Gipson APRN - CRNA 75 mL/hr at 25 0640 New Bag at 25 0640   • sodium chloride flush 0.9 % injection 5-40 mL  5-40 mL IntraVENous 2 times per day Kay Gipson APRN - CRNA       • sodium chloride flush 0.9 % injection 5-40 mL  5-40 mL IntraVENous PRN Kay Gipson APRN - CRNA       • 0.9 % sodium chloride infusion   IntraVENous PRN Kay Gipson APRN - CRNA       • Heparin (Porcine) 1000-0.9 UT/500ML-% infusion            • lidocaine 1 % injection

## 2025-02-27 NOTE — ANESTHESIA POSTPROCEDURE EVALUATION
Department of Anesthesiology  Postprocedure Note    Patient: Jorden Artis  MRN: [id number]  YOB: 1960  Date of evaluation: 2/27/2025    Procedure Summary       Date: 02/27/25 Room / Location: [facility] 02 / [facility] CARDIAC SURGERY    Anesthesia Start: 0747 Anesthesia Stop: 0943    Procedure: THROMBECTOMY AND REVISION OF RIGHT LEG BYPASS GRAFT (Right) Diagnosis:       Atheroscler nonbiologic bypass graft right leg w/intermit claudication      (Atheroscler nonbiologic bypass graft right leg w/intermit claudication [I70.611])    Surgeons: Ayad Sesay MD Responsible Provider: Maria E Gibson MD    Anesthesia Type: General ASA Status: 3            Anesthesia Type: General    Estefania Phase I: Estefania Score: 10    Estefania Phase II:      Anesthesia Post Evaluation    Patient location during evaluation: bedside  Patient participation: complete - patient participated  Airway patency: patent  Cardiovascular status: hemodynamically stable  Respiratory status: acceptable  Hydration status: stable    No notable events documented.

## 2025-02-27 NOTE — PERIOP NOTE
Patient /Family /Designee has been informed that Critical access hospital is not responsible for patient belongings per policy and the signed [facility] Patient Agreement document.  Personal items should be sent home or checked in with security.  Patient /Family /Designee selected the following action:                            []  Send personal items home with a family member or friend                                                 []  Check in personal items with security, excluding clothing                            [x]  Maintain personal items at the bedside, against recommendation                                 by [facility]                                  All items given to PACU

## 2025-02-28 LAB
ANION GAP SERPL CALC-SCNC: 10 MMOL/L (ref 3–18)
APTT PPP: 112.1 SEC (ref 23–36.4)
APTT PPP: 71 SEC (ref 23–36.4)
BUN SERPL-MCNC: 16 MG/DL (ref 7–18)
BUN/CREAT SERPL: 14 (ref 12–20)
CALCIUM SERPL-MCNC: 8.3 MG/DL (ref 8.5–10.1)
CHLORIDE SERPL-SCNC: 107 MMOL/L (ref 100–111)
CO2 SERPL-SCNC: 23 MMOL/L (ref 21–32)
CREAT SERPL-MCNC: 1.15 MG/DL (ref 0.6–1.3)
ECHO BSA: 2.09 M2
ECHO EST RA PRESSURE: 8 MMHG
ECHO LA VOL A-L A2C: 94 ML (ref 18–58)
ECHO LA VOL A-L A4C: 86 ML (ref 18–58)
ECHO LA VOL MOD A2C: 88 ML (ref 18–58)
ECHO LA VOL MOD A4C: 78 ML (ref 18–58)
ECHO LA VOLUME AREA LENGTH: 100 ML
ECHO LA VOLUME INDEX A-L A2C: 46 ML/M2 (ref 16–34)
ECHO LA VOLUME INDEX A-L A4C: 42 ML/M2 (ref 16–34)
ECHO LA VOLUME INDEX AREA LENGTH: 49 ML/M2 (ref 16–34)
ECHO LA VOLUME INDEX MOD A2C: 43 ML/M2 (ref 16–34)
ECHO LA VOLUME INDEX MOD A4C: 38 ML/M2 (ref 16–34)
ECHO LV EDV A2C: 80 ML
ECHO LV EDV A4C: 103 ML
ECHO LV EDV BP: 92 ML (ref 67–155)
ECHO LV EDV INDEX A4C: 50 ML/M2
ECHO LV EDV INDEX BP: 45 ML/M2
ECHO LV EDV NDEX A2C: 39 ML/M2
ECHO LV EF PHYSICIAN: 53 %
ECHO LV EJECTION FRACTION A2C: 57 %
ECHO LV EJECTION FRACTION A4C: 54 %
ECHO LV EJECTION FRACTION BIPLANE: 55 % (ref 55–100)
ECHO LV ESV A2C: 34 ML
ECHO LV ESV A4C: 48 ML
ECHO LV ESV BP: 41 ML (ref 22–58)
ECHO LV ESV INDEX A2C: 17 ML/M2
ECHO LV ESV INDEX A4C: 23 ML/M2
ECHO LV ESV INDEX BP: 20 ML/M2
ECHO LV FRACTIONAL SHORTENING: 28 % (ref 28–44)
ECHO LV INTERNAL DIMENSION DIASTOLE INDEX: 2.43 CM/M2
ECHO LV INTERNAL DIMENSION DIASTOLIC: 5 CM (ref 4.2–5.9)
ECHO LV INTERNAL DIMENSION SYSTOLIC INDEX: 1.75 CM/M2
ECHO LV INTERNAL DIMENSION SYSTOLIC: 3.6 CM
ECHO LV IVSD: 1.1 CM (ref 0.6–1)
ECHO LV MASS 2D: 207.1 G (ref 88–224)
ECHO LV MASS INDEX 2D: 100.6 G/M2 (ref 49–115)
ECHO LV POSTERIOR WALL DIASTOLIC: 1.1 CM (ref 0.6–1)
ECHO LV RELATIVE WALL THICKNESS RATIO: 0.44
ECHO PV MAX VELOCITY: 0.6 M/S
ECHO PV PEAK GRADIENT: 1 MMHG
ECHO RA AREA 4C: 25.3 CM2
ECHO RA VOLUME AREA LENGTH APICAL 4 CHAMBER: 84 ML
ECHO RA VOLUME: 76 ML
ECHO RA VOLUME: 84 ML
ECHO RIGHT VENTRICULAR SYSTOLIC PRESSURE (RVSP): 32 MMHG
ECHO RV BASAL DIMENSION: 4.7 CM
ECHO RV TAPSE: 1.8 CM (ref 1.7–?)
ECHO TV REGURGITANT MAX VELOCITY: 2.44 M/S
ECHO TV REGURGITANT PEAK GRADIENT: 24 MMHG
ERYTHROCYTE [DISTWIDTH] IN BLOOD BY AUTOMATED COUNT: 16.8 % (ref 11.6–14.5)
GLUCOSE SERPL-MCNC: 148 MG/DL (ref 74–99)
HCT VFR BLD AUTO: 39.6 % (ref 36–48)
HGB BLD-MCNC: 12.8 G/DL (ref 13–16)
MAGNESIUM SERPL-MCNC: 1.8 MG/DL (ref 1.6–2.6)
MCH RBC QN AUTO: 29.6 PG (ref 24–34)
MCHC RBC AUTO-ENTMCNC: 32.3 G/DL (ref 31–37)
MCV RBC AUTO: 91.7 FL (ref 78–100)
NRBC # BLD: 0 K/UL (ref 0–0.01)
NRBC BLD-RTO: 0 PER 100 WBC
PLATELET # BLD AUTO: 353 K/UL (ref 135–420)
PMV BLD AUTO: 10 FL (ref 9.2–11.8)
POTASSIUM SERPL-SCNC: 2.8 MMOL/L (ref 3.5–5.5)
POTASSIUM SERPL-SCNC: 3.2 MMOL/L (ref 3.5–5.5)
RBC # BLD AUTO: 4.32 M/UL (ref 4.35–5.65)
SODIUM SERPL-SCNC: 140 MMOL/L (ref 136–145)
WBC # BLD AUTO: 10.4 K/UL (ref 4.6–13.2)

## 2025-02-28 PROCEDURE — 84132 ASSAY OF SERUM POTASSIUM: CPT

## 2025-02-28 PROCEDURE — 2140000001 HC CVICU INTERMEDIATE R&B

## 2025-02-28 PROCEDURE — 2500000003 HC RX 250 WO HCPCS: Performed by: SURGERY

## 2025-02-28 PROCEDURE — 6370000000 HC RX 637 (ALT 250 FOR IP): Performed by: SURGERY

## 2025-02-28 PROCEDURE — 99232 SBSQ HOSP IP/OBS MODERATE 35: CPT | Performed by: INTERNAL MEDICINE

## 2025-02-28 PROCEDURE — 97162 PT EVAL MOD COMPLEX 30 MIN: CPT

## 2025-02-28 PROCEDURE — 83735 ASSAY OF MAGNESIUM: CPT

## 2025-02-28 PROCEDURE — 93321 DOPPLER ECHO F-UP/LMTD STD: CPT | Performed by: INTERNAL MEDICINE

## 2025-02-28 PROCEDURE — 94761 N-INVAS EAR/PLS OXIMETRY MLT: CPT

## 2025-02-28 PROCEDURE — 97530 THERAPEUTIC ACTIVITIES: CPT

## 2025-02-28 PROCEDURE — 6370000000 HC RX 637 (ALT 250 FOR IP)

## 2025-02-28 PROCEDURE — 93325 DOPPLER ECHO COLOR FLOW MAPG: CPT | Performed by: INTERNAL MEDICINE

## 2025-02-28 PROCEDURE — 85730 THROMBOPLASTIN TIME PARTIAL: CPT

## 2025-02-28 PROCEDURE — 6360000002 HC RX W HCPCS: Performed by: SURGERY

## 2025-02-28 PROCEDURE — 85027 COMPLETE CBC AUTOMATED: CPT

## 2025-02-28 PROCEDURE — 36415 COLL VENOUS BLD VENIPUNCTURE: CPT

## 2025-02-28 PROCEDURE — 80048 BASIC METABOLIC PNL TOTAL CA: CPT

## 2025-02-28 PROCEDURE — 93308 TTE F-UP OR LMTD: CPT | Performed by: INTERNAL MEDICINE

## 2025-02-28 RX ORDER — FUROSEMIDE 40 MG/1
40 TABLET ORAL DAILY
Status: DISCONTINUED | OUTPATIENT
Start: 2025-03-01 | End: 2025-02-28

## 2025-02-28 RX ORDER — ATORVASTATIN CALCIUM 40 MG/1
80 TABLET, FILM COATED ORAL NIGHTLY
Status: DISCONTINUED | OUTPATIENT
Start: 2025-02-28 | End: 2025-03-11 | Stop reason: HOSPADM

## 2025-02-28 RX ORDER — DILTIAZEM HYDROCHLORIDE 180 MG/1
180 CAPSULE, COATED, EXTENDED RELEASE ORAL EVERY EVENING
Status: DISCONTINUED | OUTPATIENT
Start: 2025-02-28 | End: 2025-03-01

## 2025-02-28 RX ORDER — POTASSIUM CHLORIDE 1500 MG/1
40 TABLET, EXTENDED RELEASE ORAL PRN
Status: DISCONTINUED | OUTPATIENT
Start: 2025-02-28 | End: 2025-03-11 | Stop reason: HOSPADM

## 2025-02-28 RX ORDER — FUROSEMIDE 20 MG/1
20 TABLET ORAL DAILY
Status: DISCONTINUED | OUTPATIENT
Start: 2025-03-01 | End: 2025-03-11 | Stop reason: HOSPADM

## 2025-02-28 RX ORDER — METOPROLOL TARTRATE 50 MG
50 TABLET ORAL 2 TIMES DAILY
Status: DISCONTINUED | OUTPATIENT
Start: 2025-02-28 | End: 2025-03-11 | Stop reason: HOSPADM

## 2025-02-28 RX ORDER — DILTIAZEM HYDROCHLORIDE 120 MG/1
120 CAPSULE, COATED, EXTENDED RELEASE ORAL DAILY
Status: DISCONTINUED | OUTPATIENT
Start: 2025-02-28 | End: 2025-03-01

## 2025-02-28 RX ORDER — POTASSIUM CHLORIDE 7.45 MG/ML
10 INJECTION INTRAVENOUS PRN
Status: DISCONTINUED | OUTPATIENT
Start: 2025-02-28 | End: 2025-03-11 | Stop reason: HOSPADM

## 2025-02-28 RX ADMIN — POTASSIUM CHLORIDE 40 MEQ: 1500 TABLET, EXTENDED RELEASE ORAL at 08:48

## 2025-02-28 RX ADMIN — METOPROLOL TARTRATE 50 MG: 50 TABLET, FILM COATED ORAL at 08:47

## 2025-02-28 RX ADMIN — ASPIRIN 81 MG: 81 TABLET, COATED ORAL at 08:47

## 2025-02-28 RX ADMIN — POTASSIUM CHLORIDE 10 MEQ: 7.46 INJECTION, SOLUTION INTRAVENOUS at 08:53

## 2025-02-28 RX ADMIN — POTASSIUM CHLORIDE 10 MEQ: 7.46 INJECTION, SOLUTION INTRAVENOUS at 06:47

## 2025-02-28 RX ADMIN — FUROSEMIDE 40 MG: 40 TABLET ORAL at 08:48

## 2025-02-28 RX ADMIN — HYDROMORPHONE HYDROCHLORIDE 0.5 MG: 1 INJECTION, SOLUTION INTRAMUSCULAR; INTRAVENOUS; SUBCUTANEOUS at 06:56

## 2025-02-28 RX ADMIN — PREGABALIN 75 MG: 25 CAPSULE ORAL at 08:47

## 2025-02-28 RX ADMIN — CILOSTAZOL 50 MG: 50 TABLET ORAL at 08:47

## 2025-02-28 RX ADMIN — PREGABALIN 75 MG: 25 CAPSULE ORAL at 20:06

## 2025-02-28 RX ADMIN — ATORVASTATIN CALCIUM 80 MG: 40 TABLET, FILM COATED ORAL at 20:06

## 2025-02-28 RX ADMIN — SODIUM CHLORIDE, PRESERVATIVE FREE 10 ML: 5 INJECTION INTRAVENOUS at 20:56

## 2025-02-28 RX ADMIN — DILTIAZEM HYDROCHLORIDE 180 MG: 180 CAPSULE, EXTENDED RELEASE ORAL at 17:32

## 2025-02-28 RX ADMIN — HEPARIN SODIUM 16 UNITS/KG/HR: 10000 INJECTION, SOLUTION INTRAVENOUS at 12:12

## 2025-02-28 RX ADMIN — POTASSIUM CHLORIDE 40 MEQ: 1500 TABLET, EXTENDED RELEASE ORAL at 17:32

## 2025-02-28 RX ADMIN — METOPROLOL TARTRATE 50 MG: 50 TABLET, FILM COATED ORAL at 20:08

## 2025-02-28 RX ADMIN — DILTIAZEM HYDROCHLORIDE 120 MG: 120 CAPSULE, COATED, EXTENDED RELEASE ORAL at 12:21

## 2025-02-28 RX ADMIN — SODIUM CHLORIDE, PRESERVATIVE FREE 10 ML: 5 INJECTION INTRAVENOUS at 08:58

## 2025-02-28 RX ADMIN — HYDROMORPHONE HYDROCHLORIDE 0.5 MG: 1 INJECTION, SOLUTION INTRAMUSCULAR; INTRAVENOUS; SUBCUTANEOUS at 18:53

## 2025-02-28 RX ADMIN — HEPARIN SODIUM 3500 UNITS: 1000 INJECTION INTRAVENOUS; SUBCUTANEOUS at 15:44

## 2025-02-28 RX ADMIN — CILOSTAZOL 50 MG: 50 TABLET ORAL at 20:06

## 2025-02-28 ASSESSMENT — PAIN SCALES - GENERAL
PAINLEVEL_OUTOF10: 0
PAINLEVEL_OUTOF10: 0
PAINLEVEL_OUTOF10: 7
PAINLEVEL_OUTOF10: 0
PAINLEVEL_OUTOF10: 7
PAINLEVEL_OUTOF10: 0

## 2025-02-28 ASSESSMENT — PAIN DESCRIPTION - LOCATION
LOCATION: BACK;FOOT;LEG
LOCATION: BACK

## 2025-02-28 ASSESSMENT — PAIN DESCRIPTION - ORIENTATION
ORIENTATION: POSTERIOR
ORIENTATION: MID;RIGHT;LOWER

## 2025-02-28 ASSESSMENT — PAIN - FUNCTIONAL ASSESSMENT: PAIN_FUNCTIONAL_ASSESSMENT: PREVENTS OR INTERFERES SOME ACTIVE ACTIVITIES AND ADLS

## 2025-02-28 ASSESSMENT — PAIN DESCRIPTION - DESCRIPTORS
DESCRIPTORS: ACHING
DESCRIPTORS: ACHING

## 2025-02-28 ASSESSMENT — PAIN DESCRIPTION - ONSET: ONSET: PROGRESSIVE

## 2025-02-28 ASSESSMENT — PAIN DESCRIPTION - PAIN TYPE: TYPE: CHRONIC PAIN

## 2025-02-28 ASSESSMENT — PAIN DESCRIPTION - FREQUENCY: FREQUENCY: INTERMITTENT

## 2025-02-28 NOTE — CARE COORDINATION
02/28/25 1603   Service Assessment   Patient Orientation Alert and Oriented   Cognition Alert   History Provided By Patient   Primary Caregiver Self   Support Systems Children;Family Members   Patient's Healthcare Decision Maker is: Named in Scanned ACP Document   PCP Verified by CM Yes   Last Visit to PCP Within last 3 months   Prior Functional Level Independent in ADLs/IADLs   Current Functional Level Independent in ADLs/IADLs   Can patient return to prior living arrangement Yes   Ability to make needs known: Good   Family able to assist with home care needs: Yes   Would you like for me to discuss the discharge plan with any other family members/significant others, and if so, who? Yes  (daughter Isis)   Financial Resources Medicare   Community Resources None   CM/SW Referral Other (see comment)  (discharge planning)   Social/Functional History   Lives With Alone   Type of Home House   Home Layout One level   Home Access Stairs to enter with rails   Entrance Stairs - Number of Steps 3   Entrance Stairs - Rails Right   Bathroom Shower/Tub Tub/Shower unit   Bathroom Toilet Standard   Bathroom Equipment Grab bars in shower;Shower chair   Home Equipment Crutches;Walker - Rolling;Wheelchair - Manual   Receives Help From Family   Prior Level of Assist for ADLs Independent   Prior Level of Assist for Homemaking Independent   Ambulation Assistance Independent   Prior Level of Assist for Transfers Independent   Active  Yes   Mode of Transportation Truck   Occupation Retired   Discharge Planning   Type of Residence House   Living Arrangements Alone   Current Services Prior To Admission Durable Medical Equipment   Current DME Prior to Arrival Crutches;Wheelchair;Walker   Potential Assistance Needed N/A   DME Ordered? No   Potential Assistance Purchasing Medications No   Type of Home Care Services None   Patient expects to be discharged to: House   Services At/After Discharge   Transition of Care Consult (ERIBERTO

## 2025-02-28 NOTE — CARE COORDINATION
02/28/25 1602   IMM Letter   IMM Letter given to Patient/Family/Significant other/Guardian/POA/by: Jojo Schwab   IMM Letter date given: 02/28/25   IMM Letter time given: 1530       Medicare pt has received, reviewed, and signed 2nd IM letter informing them of their right to appeal the discharge.  Signed copy has been placed on pt bedside chart.        RACHEL Maya RN  Care Management

## 2025-02-28 NOTE — NURSE NAVIGATOR
Met with patient provided education on living with heart failure, reinforcing low sodium diet, fluid restriction, heart failure zones, will continue to follow.  HF Navigator discussed cardiac university information with pt:    [    ] Pt does plan on attnding via   [   ] inperson          [   ] zoom  [  x  ] Pt does not wish to attend    Gayatri Cooper RN  2/28/2025

## 2025-03-01 PROBLEM — I25.83 CORONARY ARTERY DISEASE DUE TO LIPID RICH PLAQUE: Status: ACTIVE | Noted: 2017-03-15

## 2025-03-01 PROBLEM — I10 HYPERTENSION: Status: ACTIVE | Noted: 2025-03-01

## 2025-03-01 PROBLEM — I42.9 CARDIOMYOPATHY (HCC): Status: ACTIVE | Noted: 2025-03-01

## 2025-03-01 PROBLEM — E78.49 OTHER HYPERLIPIDEMIA: Status: ACTIVE | Noted: 2025-03-01

## 2025-03-01 LAB
ANION GAP SERPL CALC-SCNC: 5 MMOL/L (ref 3–18)
APTT PPP: 130.3 SEC (ref 23–36.4)
APTT PPP: 73.1 SEC (ref 23–36.4)
APTT PPP: 85.3 SEC (ref 23–36.4)
BUN SERPL-MCNC: 16 MG/DL (ref 7–18)
BUN/CREAT SERPL: 15 (ref 12–20)
CALCIUM SERPL-MCNC: 8.5 MG/DL (ref 8.5–10.1)
CHLORIDE SERPL-SCNC: 110 MMOL/L (ref 100–111)
CO2 SERPL-SCNC: 26 MMOL/L (ref 21–32)
CREAT SERPL-MCNC: 1.05 MG/DL (ref 0.6–1.3)
ERYTHROCYTE [DISTWIDTH] IN BLOOD BY AUTOMATED COUNT: 17.2 % (ref 11.6–14.5)
GLUCOSE SERPL-MCNC: 105 MG/DL (ref 74–99)
HCT VFR BLD AUTO: 36.4 % (ref 36–48)
HGB BLD-MCNC: 12.3 G/DL (ref 13–16)
MCH RBC QN AUTO: 30.9 PG (ref 24–34)
MCHC RBC AUTO-ENTMCNC: 33.8 G/DL (ref 31–37)
MCV RBC AUTO: 91.5 FL (ref 78–100)
NRBC # BLD: 0 K/UL (ref 0–0.01)
NRBC BLD-RTO: 0 PER 100 WBC
PLATELET # BLD AUTO: 322 K/UL (ref 135–420)
PMV BLD AUTO: 9.5 FL (ref 9.2–11.8)
POTASSIUM SERPL-SCNC: 3.5 MMOL/L (ref 3.5–5.5)
RBC # BLD AUTO: 3.98 M/UL (ref 4.35–5.65)
SODIUM SERPL-SCNC: 141 MMOL/L (ref 136–145)
WBC # BLD AUTO: 11.4 K/UL (ref 4.6–13.2)

## 2025-03-01 PROCEDURE — 2140000001 HC CVICU INTERMEDIATE R&B

## 2025-03-01 PROCEDURE — 6360000002 HC RX W HCPCS: Performed by: SURGERY

## 2025-03-01 PROCEDURE — 85027 COMPLETE CBC AUTOMATED: CPT

## 2025-03-01 PROCEDURE — 85730 THROMBOPLASTIN TIME PARTIAL: CPT

## 2025-03-01 PROCEDURE — 80048 BASIC METABOLIC PNL TOTAL CA: CPT

## 2025-03-01 PROCEDURE — 2500000003 HC RX 250 WO HCPCS: Performed by: SURGERY

## 2025-03-01 PROCEDURE — 99232 SBSQ HOSP IP/OBS MODERATE 35: CPT | Performed by: INTERNAL MEDICINE

## 2025-03-01 PROCEDURE — 6370000000 HC RX 637 (ALT 250 FOR IP): Performed by: SURGERY

## 2025-03-01 PROCEDURE — 36415 COLL VENOUS BLD VENIPUNCTURE: CPT

## 2025-03-01 PROCEDURE — 94761 N-INVAS EAR/PLS OXIMETRY MLT: CPT

## 2025-03-01 PROCEDURE — 6370000000 HC RX 637 (ALT 250 FOR IP)

## 2025-03-01 RX ADMIN — SODIUM CHLORIDE, PRESERVATIVE FREE 10 ML: 5 INJECTION INTRAVENOUS at 09:33

## 2025-03-01 RX ADMIN — PREGABALIN 75 MG: 25 CAPSULE ORAL at 20:20

## 2025-03-01 RX ADMIN — CILOSTAZOL 50 MG: 50 TABLET ORAL at 21:52

## 2025-03-01 RX ADMIN — SODIUM CHLORIDE, PRESERVATIVE FREE 10 ML: 5 INJECTION INTRAVENOUS at 21:51

## 2025-03-01 RX ADMIN — HYDROMORPHONE HYDROCHLORIDE 0.5 MG: 1 INJECTION, SOLUTION INTRAMUSCULAR; INTRAVENOUS; SUBCUTANEOUS at 00:49

## 2025-03-01 RX ADMIN — METOPROLOL TARTRATE 50 MG: 50 TABLET, FILM COATED ORAL at 09:24

## 2025-03-01 RX ADMIN — DILTIAZEM HYDROCHLORIDE 120 MG: 120 CAPSULE, COATED, EXTENDED RELEASE ORAL at 09:24

## 2025-03-01 RX ADMIN — ASPIRIN 81 MG: 81 TABLET, COATED ORAL at 09:24

## 2025-03-01 RX ADMIN — HYDROMORPHONE HYDROCHLORIDE 0.5 MG: 1 INJECTION, SOLUTION INTRAMUSCULAR; INTRAVENOUS; SUBCUTANEOUS at 15:50

## 2025-03-01 RX ADMIN — ATORVASTATIN CALCIUM 80 MG: 40 TABLET, FILM COATED ORAL at 20:21

## 2025-03-01 RX ADMIN — POTASSIUM CHLORIDE 40 MEQ: 1500 TABLET, EXTENDED RELEASE ORAL at 06:41

## 2025-03-01 RX ADMIN — HEPARIN SODIUM 16 UNITS/KG/HR: 10000 INJECTION, SOLUTION INTRAVENOUS at 06:43

## 2025-03-01 RX ADMIN — FUROSEMIDE 20 MG: 20 TABLET ORAL at 09:24

## 2025-03-01 RX ADMIN — METOPROLOL TARTRATE 50 MG: 50 TABLET, FILM COATED ORAL at 20:20

## 2025-03-01 RX ADMIN — CILOSTAZOL 50 MG: 50 TABLET ORAL at 09:24

## 2025-03-01 RX ADMIN — PREGABALIN 75 MG: 25 CAPSULE ORAL at 09:23

## 2025-03-01 ASSESSMENT — PAIN DESCRIPTION - FREQUENCY: FREQUENCY: INTERMITTENT

## 2025-03-01 ASSESSMENT — PAIN SCALES - WONG BAKER
WONGBAKER_NUMERICALRESPONSE: NO HURT
WONGBAKER_NUMERICALRESPONSE: NO HURT

## 2025-03-01 ASSESSMENT — PAIN SCALES - GENERAL
PAINLEVEL_OUTOF10: 7
PAINLEVEL_OUTOF10: 0
PAINLEVEL_OUTOF10: 7
PAINLEVEL_OUTOF10: 0
PAINLEVEL_OUTOF10: 0
PAINLEVEL_OUTOF10: 3

## 2025-03-01 ASSESSMENT — PAIN DESCRIPTION - ONSET: ONSET: PROGRESSIVE

## 2025-03-01 ASSESSMENT — PAIN DESCRIPTION - DESCRIPTORS
DESCRIPTORS: ACHING
DESCRIPTORS: ACHING;DISCOMFORT

## 2025-03-01 ASSESSMENT — PAIN - FUNCTIONAL ASSESSMENT: PAIN_FUNCTIONAL_ASSESSMENT: ACTIVITIES ARE NOT PREVENTED

## 2025-03-01 ASSESSMENT — PAIN DESCRIPTION - LOCATION
LOCATION: LEG
LOCATION: HAND

## 2025-03-01 ASSESSMENT — PAIN DESCRIPTION - ORIENTATION
ORIENTATION: RIGHT
ORIENTATION: RIGHT

## 2025-03-01 ASSESSMENT — PAIN DESCRIPTION - PAIN TYPE: TYPE: ACUTE PAIN

## 2025-03-02 ENCOUNTER — ANESTHESIA EVENT (OUTPATIENT)
Dept: CARDIOTHORACIC SURGERY | Facility: HOSPITAL | Age: 65
End: 2025-03-02
Payer: MEDICARE

## 2025-03-02 LAB
ALBUMIN SERPL-MCNC: 2.9 G/DL (ref 3.4–5)
ALBUMIN/GLOB SERPL: 0.7 (ref 0.8–1.7)
ALP SERPL-CCNC: 161 U/L (ref 45–117)
ALT SERPL-CCNC: 33 U/L (ref 16–61)
ANION GAP SERPL CALC-SCNC: 5 MMOL/L (ref 3–18)
APTT PPP: 92.5 SEC (ref 23–36.4)
AST SERPL-CCNC: 29 U/L (ref 10–38)
BILIRUB SERPL-MCNC: 0.9 MG/DL (ref 0.2–1)
BUN SERPL-MCNC: 16 MG/DL (ref 7–18)
BUN/CREAT SERPL: 16 (ref 12–20)
CALCIUM SERPL-MCNC: 8.6 MG/DL (ref 8.5–10.1)
CHLORIDE SERPL-SCNC: 107 MMOL/L (ref 100–111)
CO2 SERPL-SCNC: 27 MMOL/L (ref 21–32)
CREAT SERPL-MCNC: 1.01 MG/DL (ref 0.6–1.3)
GLOBULIN SER CALC-MCNC: 4 G/DL (ref 2–4)
GLUCOSE SERPL-MCNC: 90 MG/DL (ref 74–99)
POTASSIUM SERPL-SCNC: 3.7 MMOL/L (ref 3.5–5.5)
PROT SERPL-MCNC: 6.9 G/DL (ref 6.4–8.2)
SODIUM SERPL-SCNC: 139 MMOL/L (ref 136–145)

## 2025-03-02 PROCEDURE — 99232 SBSQ HOSP IP/OBS MODERATE 35: CPT | Performed by: INTERNAL MEDICINE

## 2025-03-02 PROCEDURE — 6370000000 HC RX 637 (ALT 250 FOR IP)

## 2025-03-02 PROCEDURE — 36415 COLL VENOUS BLD VENIPUNCTURE: CPT

## 2025-03-02 PROCEDURE — 85730 THROMBOPLASTIN TIME PARTIAL: CPT

## 2025-03-02 PROCEDURE — 6360000002 HC RX W HCPCS: Performed by: SURGERY

## 2025-03-02 PROCEDURE — 94761 N-INVAS EAR/PLS OXIMETRY MLT: CPT

## 2025-03-02 PROCEDURE — 6370000000 HC RX 637 (ALT 250 FOR IP): Performed by: SURGERY

## 2025-03-02 PROCEDURE — 2500000003 HC RX 250 WO HCPCS: Performed by: SURGERY

## 2025-03-02 PROCEDURE — 2140000001 HC CVICU INTERMEDIATE R&B

## 2025-03-02 PROCEDURE — 80053 COMPREHEN METABOLIC PANEL: CPT

## 2025-03-02 RX ADMIN — HEPARIN SODIUM 16 UNITS/KG/HR: 10000 INJECTION, SOLUTION INTRAVENOUS at 19:27

## 2025-03-02 RX ADMIN — HEPARIN SODIUM 16 UNITS/KG/HR: 10000 INJECTION, SOLUTION INTRAVENOUS at 00:39

## 2025-03-02 RX ADMIN — SODIUM CHLORIDE, PRESERVATIVE FREE 10 ML: 5 INJECTION INTRAVENOUS at 20:58

## 2025-03-02 RX ADMIN — SODIUM CHLORIDE, PRESERVATIVE FREE 10 ML: 5 INJECTION INTRAVENOUS at 08:57

## 2025-03-02 RX ADMIN — ASPIRIN 81 MG: 81 TABLET, COATED ORAL at 08:52

## 2025-03-02 RX ADMIN — HYDROMORPHONE HYDROCHLORIDE 0.5 MG: 1 INJECTION, SOLUTION INTRAMUSCULAR; INTRAVENOUS; SUBCUTANEOUS at 21:02

## 2025-03-02 RX ADMIN — PREGABALIN 75 MG: 25 CAPSULE ORAL at 08:52

## 2025-03-02 RX ADMIN — METOPROLOL TARTRATE 50 MG: 50 TABLET, FILM COATED ORAL at 20:58

## 2025-03-02 RX ADMIN — PREGABALIN 75 MG: 25 CAPSULE ORAL at 20:58

## 2025-03-02 RX ADMIN — METOPROLOL TARTRATE 50 MG: 50 TABLET, FILM COATED ORAL at 08:52

## 2025-03-02 RX ADMIN — ATORVASTATIN CALCIUM 80 MG: 40 TABLET, FILM COATED ORAL at 20:58

## 2025-03-02 RX ADMIN — CILOSTAZOL 50 MG: 50 TABLET ORAL at 08:52

## 2025-03-02 RX ADMIN — HYDROMORPHONE HYDROCHLORIDE 0.5 MG: 1 INJECTION, SOLUTION INTRAMUSCULAR; INTRAVENOUS; SUBCUTANEOUS at 14:52

## 2025-03-02 RX ADMIN — CILOSTAZOL 50 MG: 50 TABLET ORAL at 20:58

## 2025-03-02 RX ADMIN — FUROSEMIDE 20 MG: 20 TABLET ORAL at 08:52

## 2025-03-02 ASSESSMENT — PAIN SCALES - GENERAL
PAINLEVEL_OUTOF10: 7
PAINLEVEL_OUTOF10: 0
PAINLEVEL_OUTOF10: 1
PAINLEVEL_OUTOF10: 7
PAINLEVEL_OUTOF10: 0
PAINLEVEL_OUTOF10: 0

## 2025-03-02 ASSESSMENT — PAIN DESCRIPTION - ORIENTATION
ORIENTATION: RIGHT
ORIENTATION: RIGHT

## 2025-03-02 ASSESSMENT — PAIN DESCRIPTION - DESCRIPTORS
DESCRIPTORS: BURNING;PINS AND NEEDLES
DESCRIPTORS: BURNING;PINS AND NEEDLES;TINGLING

## 2025-03-02 ASSESSMENT — PAIN SCALES - WONG BAKER
WONGBAKER_NUMERICALRESPONSE: NO HURT

## 2025-03-02 ASSESSMENT — PAIN DESCRIPTION - LOCATION
LOCATION: LEG
LOCATION: LEG

## 2025-03-02 ASSESSMENT — PAIN DESCRIPTION - FREQUENCY: FREQUENCY: INTERMITTENT

## 2025-03-02 ASSESSMENT — PAIN DESCRIPTION - ONSET: ONSET: PROGRESSIVE

## 2025-03-02 ASSESSMENT — PAIN DESCRIPTION - PAIN TYPE: TYPE: ACUTE PAIN

## 2025-03-03 ENCOUNTER — ANESTHESIA (OUTPATIENT)
Dept: CARDIOTHORACIC SURGERY | Facility: HOSPITAL | Age: 65
End: 2025-03-03
Payer: MEDICARE

## 2025-03-03 ENCOUNTER — APPOINTMENT (OUTPATIENT)
Facility: HOSPITAL | Age: 65
DRG: 253 | End: 2025-03-03
Attending: SURGERY
Payer: MEDICARE

## 2025-03-03 LAB
ANION GAP SERPL CALC-SCNC: 8 MMOL/L (ref 3–18)
APTT PPP: 113.9 SEC (ref 23–36.4)
APTT PPP: 37.2 SEC (ref 23–36.4)
BUN SERPL-MCNC: 12 MG/DL (ref 7–18)
BUN/CREAT SERPL: 13 (ref 12–20)
CALCIUM SERPL-MCNC: 8.9 MG/DL (ref 8.5–10.1)
CHLORIDE SERPL-SCNC: 105 MMOL/L (ref 100–111)
CO2 SERPL-SCNC: 27 MMOL/L (ref 21–32)
CREAT SERPL-MCNC: 0.96 MG/DL (ref 0.6–1.3)
ERYTHROCYTE [DISTWIDTH] IN BLOOD BY AUTOMATED COUNT: 16.6 % (ref 11.6–14.5)
ERYTHROCYTE [DISTWIDTH] IN BLOOD BY AUTOMATED COUNT: 16.7 % (ref 11.6–14.5)
GLUCOSE SERPL-MCNC: 86 MG/DL (ref 74–99)
HCT VFR BLD AUTO: 38.2 % (ref 36–48)
HCT VFR BLD AUTO: 40.2 % (ref 36–48)
HGB BLD-MCNC: 12.9 G/DL (ref 13–16)
HGB BLD-MCNC: 13.3 G/DL (ref 13–16)
MAGNESIUM SERPL-MCNC: 2 MG/DL (ref 1.6–2.6)
MCH RBC QN AUTO: 30.1 PG (ref 24–34)
MCH RBC QN AUTO: 31.2 PG (ref 24–34)
MCHC RBC AUTO-ENTMCNC: 33.1 G/DL (ref 31–37)
MCHC RBC AUTO-ENTMCNC: 33.8 G/DL (ref 31–37)
MCV RBC AUTO: 91 FL (ref 78–100)
MCV RBC AUTO: 92.3 FL (ref 78–100)
NRBC # BLD: 0 K/UL (ref 0–0.01)
NRBC # BLD: 0.02 K/UL (ref 0–0.01)
NRBC BLD-RTO: 0 PER 100 WBC
NRBC BLD-RTO: 0.2 PER 100 WBC
PLATELET # BLD AUTO: 320 K/UL (ref 135–420)
PLATELET # BLD AUTO: 369 K/UL (ref 135–420)
PMV BLD AUTO: 10 FL (ref 9.2–11.8)
PMV BLD AUTO: 9.8 FL (ref 9.2–11.8)
POTASSIUM SERPL-SCNC: 3.7 MMOL/L (ref 3.5–5.5)
RBC # BLD AUTO: 4.14 M/UL (ref 4.35–5.65)
RBC # BLD AUTO: 4.42 M/UL (ref 4.35–5.65)
SODIUM SERPL-SCNC: 140 MMOL/L (ref 136–145)
WBC # BLD AUTO: 11.1 K/UL (ref 4.6–13.2)
WBC # BLD AUTO: 9 K/UL (ref 4.6–13.2)

## 2025-03-03 PROCEDURE — 6370000000 HC RX 637 (ALT 250 FOR IP): Performed by: NURSE ANESTHETIST, CERTIFIED REGISTERED

## 2025-03-03 PROCEDURE — C1725 CATH, TRANSLUMIN NON-LASER: HCPCS | Performed by: SURGERY

## 2025-03-03 PROCEDURE — 99232 SBSQ HOSP IP/OBS MODERATE 35: CPT | Performed by: INTERNAL MEDICINE

## 2025-03-03 PROCEDURE — 6360000004 HC RX CONTRAST MEDICATION: Performed by: SURGERY

## 2025-03-03 PROCEDURE — 2580000003 HC RX 258: Performed by: NURSE ANESTHETIST, CERTIFIED REGISTERED

## 2025-03-03 PROCEDURE — 80048 BASIC METABOLIC PNL TOTAL CA: CPT

## 2025-03-03 PROCEDURE — 36415 COLL VENOUS BLD VENIPUNCTURE: CPT

## 2025-03-03 PROCEDURE — 2140000001 HC CVICU INTERMEDIATE R&B

## 2025-03-03 PROCEDURE — 6370000000 HC RX 637 (ALT 250 FOR IP): Performed by: SURGERY

## 2025-03-03 PROCEDURE — C1894 INTRO/SHEATH, NON-LASER: HCPCS | Performed by: SURGERY

## 2025-03-03 PROCEDURE — B41F1ZZ FLUOROSCOPY OF RIGHT LOWER EXTREMITY ARTERIES USING LOW OSMOLAR CONTRAST: ICD-10-PCS | Performed by: SURGERY

## 2025-03-03 PROCEDURE — 6370000000 HC RX 637 (ALT 250 FOR IP)

## 2025-03-03 PROCEDURE — 94761 N-INVAS EAR/PLS OXIMETRY MLT: CPT

## 2025-03-03 PROCEDURE — 2709999900 HC NON-CHARGEABLE SUPPLY: Performed by: SURGERY

## 2025-03-03 PROCEDURE — 047K34Z DILATION OF RIGHT FEMORAL ARTERY WITH DRUG-ELUTING INTRALUMINAL DEVICE, PERCUTANEOUS APPROACH: ICD-10-PCS | Performed by: SURGERY

## 2025-03-03 PROCEDURE — 6360000002 HC RX W HCPCS: Performed by: SURGERY

## 2025-03-03 PROCEDURE — 85027 COMPLETE CBC AUTOMATED: CPT

## 2025-03-03 PROCEDURE — 7100000000 HC PACU RECOVERY - FIRST 15 MIN: Performed by: SURGERY

## 2025-03-03 PROCEDURE — 3700000000 HC ANESTHESIA ATTENDED CARE: Performed by: SURGERY

## 2025-03-03 PROCEDURE — 3700000001 HC ADD 15 MINUTES (ANESTHESIA): Performed by: SURGERY

## 2025-03-03 PROCEDURE — C1874 STENT, COATED/COV W/DEL SYS: HCPCS | Performed by: SURGERY

## 2025-03-03 PROCEDURE — 6360000002 HC RX W HCPCS: Performed by: NURSE ANESTHETIST, CERTIFIED REGISTERED

## 2025-03-03 PROCEDURE — 2700000000 HC OXYGEN THERAPY PER DAY

## 2025-03-03 PROCEDURE — 85730 THROMBOPLASTIN TIME PARTIAL: CPT

## 2025-03-03 PROCEDURE — 3600000002 HC SURGERY LEVEL 2 BASE: Performed by: SURGERY

## 2025-03-03 PROCEDURE — C1757 CATH, THROMBECTOMY/EMBOLECT: HCPCS | Performed by: SURGERY

## 2025-03-03 PROCEDURE — 2720000010 HC SURG SUPPLY STERILE: Performed by: SURGERY

## 2025-03-03 PROCEDURE — C1769 GUIDE WIRE: HCPCS | Performed by: SURGERY

## 2025-03-03 PROCEDURE — 3600000012 HC SURGERY LEVEL 2 ADDTL 15MIN: Performed by: SURGERY

## 2025-03-03 PROCEDURE — 2500000003 HC RX 250 WO HCPCS: Performed by: SURGERY

## 2025-03-03 PROCEDURE — 83735 ASSAY OF MAGNESIUM: CPT

## 2025-03-03 PROCEDURE — 7100000001 HC PACU RECOVERY - ADDTL 15 MIN: Performed by: SURGERY

## 2025-03-03 DEVICE — DRUG-ELUTING VASCULAR STENT SYSTEM
Type: IMPLANTABLE DEVICE | Site: LEG | Status: FUNCTIONAL
Brand: ELUVIA™

## 2025-03-03 RX ORDER — SODIUM CHLORIDE 9 MG/ML
INJECTION, SOLUTION INTRAVENOUS CONTINUOUS
Status: DISCONTINUED | OUTPATIENT
Start: 2025-03-03 | End: 2025-03-03 | Stop reason: HOSPADM

## 2025-03-03 RX ORDER — DILTIAZEM HYDROCHLORIDE 120 MG/1
120 CAPSULE, COATED, EXTENDED RELEASE ORAL DAILY
Status: DISCONTINUED | OUTPATIENT
Start: 2025-03-03 | End: 2025-03-03

## 2025-03-03 RX ORDER — SODIUM CHLORIDE 9 MG/ML
INJECTION, SOLUTION INTRAVENOUS PRN
Status: DISCONTINUED | OUTPATIENT
Start: 2025-03-03 | End: 2025-03-03 | Stop reason: HOSPADM

## 2025-03-03 RX ORDER — CEFAZOLIN SODIUM 1 G/3ML
INJECTION, POWDER, FOR SOLUTION INTRAMUSCULAR; INTRAVENOUS
Status: DISCONTINUED | OUTPATIENT
Start: 2025-03-03 | End: 2025-03-03 | Stop reason: SDUPTHER

## 2025-03-03 RX ORDER — HEPARIN SODIUM 200 [USP'U]/100ML
INJECTION, SOLUTION INTRAVENOUS
Status: DISPENSED
Start: 2025-03-03 | End: 2025-03-04

## 2025-03-03 RX ORDER — HEPARIN SODIUM 1000 [USP'U]/ML
80 INJECTION, SOLUTION INTRAVENOUS; SUBCUTANEOUS PRN
Status: DISCONTINUED | OUTPATIENT
Start: 2025-03-03 | End: 2025-03-11

## 2025-03-03 RX ORDER — PHENYLEPHRINE HCL IN 0.9% NACL 1 MG/10 ML
SYRINGE (ML) INTRAVENOUS
Status: DISCONTINUED | OUTPATIENT
Start: 2025-03-03 | End: 2025-03-03 | Stop reason: SDUPTHER

## 2025-03-03 RX ORDER — ONDANSETRON 2 MG/ML
INJECTION INTRAMUSCULAR; INTRAVENOUS
Status: DISCONTINUED | OUTPATIENT
Start: 2025-03-03 | End: 2025-03-03 | Stop reason: SDUPTHER

## 2025-03-03 RX ORDER — LIDOCAINE HYDROCHLORIDE 20 MG/ML
INJECTION, SOLUTION EPIDURAL; INFILTRATION; INTRACAUDAL; PERINEURAL
Status: DISCONTINUED | OUTPATIENT
Start: 2025-03-03 | End: 2025-03-03 | Stop reason: SDUPTHER

## 2025-03-03 RX ORDER — DEXAMETHASONE SODIUM PHOSPHATE 4 MG/ML
INJECTION, SOLUTION INTRA-ARTICULAR; INTRALESIONAL; INTRAMUSCULAR; INTRAVENOUS; SOFT TISSUE
Status: DISCONTINUED | OUTPATIENT
Start: 2025-03-03 | End: 2025-03-03 | Stop reason: SDUPTHER

## 2025-03-03 RX ORDER — HEPARIN SODIUM 10000 [USP'U]/100ML
5-30 INJECTION, SOLUTION INTRAVENOUS CONTINUOUS
Status: DISCONTINUED | OUTPATIENT
Start: 2025-03-03 | End: 2025-03-10

## 2025-03-03 RX ORDER — HEPARIN SODIUM 1000 [USP'U]/ML
INJECTION, SOLUTION INTRAVENOUS; SUBCUTANEOUS
Status: DISCONTINUED | OUTPATIENT
Start: 2025-03-03 | End: 2025-03-03 | Stop reason: SDUPTHER

## 2025-03-03 RX ORDER — HEPARIN SODIUM 200 [USP'U]/100ML
INJECTION, SOLUTION INTRAVENOUS CONTINUOUS PRN
Status: COMPLETED | OUTPATIENT
Start: 2025-03-03 | End: 2025-03-03

## 2025-03-03 RX ORDER — SODIUM CHLORIDE 0.9 % (FLUSH) 0.9 %
5-40 SYRINGE (ML) INJECTION PRN
Status: DISCONTINUED | OUTPATIENT
Start: 2025-03-03 | End: 2025-03-03 | Stop reason: HOSPADM

## 2025-03-03 RX ORDER — ESMOLOL HYDROCHLORIDE 10 MG/ML
INJECTION INTRAVENOUS
Status: DISCONTINUED | OUTPATIENT
Start: 2025-03-03 | End: 2025-03-03 | Stop reason: SDUPTHER

## 2025-03-03 RX ORDER — FENTANYL CITRATE 50 UG/ML
50 INJECTION, SOLUTION INTRAMUSCULAR; INTRAVENOUS EVERY 5 MIN PRN
Status: DISCONTINUED | OUTPATIENT
Start: 2025-03-03 | End: 2025-03-03 | Stop reason: HOSPADM

## 2025-03-03 RX ORDER — HEPARIN SODIUM 1000 [USP'U]/ML
40 INJECTION, SOLUTION INTRAVENOUS; SUBCUTANEOUS PRN
Status: DISCONTINUED | OUTPATIENT
Start: 2025-03-03 | End: 2025-03-11

## 2025-03-03 RX ORDER — SODIUM CHLORIDE 9 MG/ML
INJECTION, SOLUTION INTRAVENOUS
Status: DISCONTINUED | OUTPATIENT
Start: 2025-03-03 | End: 2025-03-03 | Stop reason: SDUPTHER

## 2025-03-03 RX ORDER — HEPARIN SODIUM 200 [USP'U]/100ML
INJECTION, SOLUTION INTRAVENOUS
Status: DISCONTINUED
Start: 2025-03-03 | End: 2025-03-03 | Stop reason: WASHOUT

## 2025-03-03 RX ORDER — DILTIAZEM HYDROCHLORIDE 120 MG/1
120 CAPSULE, COATED, EXTENDED RELEASE ORAL EVERY MORNING
Status: DISCONTINUED | OUTPATIENT
Start: 2025-03-03 | End: 2025-03-11 | Stop reason: HOSPADM

## 2025-03-03 RX ORDER — MIDAZOLAM HYDROCHLORIDE 1 MG/ML
INJECTION, SOLUTION INTRAMUSCULAR; INTRAVENOUS
Status: DISCONTINUED | OUTPATIENT
Start: 2025-03-03 | End: 2025-03-03 | Stop reason: SDUPTHER

## 2025-03-03 RX ORDER — DILTIAZEM HYDROCHLORIDE 180 MG/1
180 CAPSULE, COATED, EXTENDED RELEASE ORAL EVERY EVENING
Status: DISCONTINUED | OUTPATIENT
Start: 2025-03-03 | End: 2025-03-11 | Stop reason: HOSPADM

## 2025-03-03 RX ORDER — SODIUM CHLORIDE, SODIUM LACTATE, POTASSIUM CHLORIDE, CALCIUM CHLORIDE 600; 310; 30; 20 MG/100ML; MG/100ML; MG/100ML; MG/100ML
INJECTION, SOLUTION INTRAVENOUS CONTINUOUS
Status: DISCONTINUED | OUTPATIENT
Start: 2025-03-03 | End: 2025-03-03 | Stop reason: HOSPADM

## 2025-03-03 RX ORDER — PROPOFOL 10 MG/ML
INJECTION, EMULSION INTRAVENOUS
Status: DISCONTINUED | OUTPATIENT
Start: 2025-03-03 | End: 2025-03-03 | Stop reason: SDUPTHER

## 2025-03-03 RX ORDER — IODIXANOL 320 MG/ML
INJECTION, SOLUTION INTRAVASCULAR PRN
Status: DISCONTINUED | OUTPATIENT
Start: 2025-03-03 | End: 2025-03-03 | Stop reason: ALTCHOICE

## 2025-03-03 RX ORDER — FAMOTIDINE 20 MG/1
20 TABLET, FILM COATED ORAL ONCE
Status: COMPLETED | OUTPATIENT
Start: 2025-03-03 | End: 2025-03-03

## 2025-03-03 RX ORDER — FENTANYL CITRATE 50 UG/ML
INJECTION, SOLUTION INTRAMUSCULAR; INTRAVENOUS
Status: DISCONTINUED | OUTPATIENT
Start: 2025-03-03 | End: 2025-03-03 | Stop reason: SDUPTHER

## 2025-03-03 RX ORDER — SODIUM CHLORIDE 0.9 % (FLUSH) 0.9 %
5-40 SYRINGE (ML) INJECTION EVERY 12 HOURS SCHEDULED
Status: DISCONTINUED | OUTPATIENT
Start: 2025-03-03 | End: 2025-03-03 | Stop reason: HOSPADM

## 2025-03-03 RX ORDER — FENTANYL CITRATE 50 UG/ML
25 INJECTION, SOLUTION INTRAMUSCULAR; INTRAVENOUS EVERY 5 MIN PRN
Status: DISCONTINUED | OUTPATIENT
Start: 2025-03-03 | End: 2025-03-03 | Stop reason: HOSPADM

## 2025-03-03 RX ORDER — ONDANSETRON 2 MG/ML
4 INJECTION INTRAMUSCULAR; INTRAVENOUS
Status: DISCONTINUED | OUTPATIENT
Start: 2025-03-03 | End: 2025-03-03 | Stop reason: HOSPADM

## 2025-03-03 RX ADMIN — FENTANYL CITRATE 25 MCG: 50 INJECTION INTRAMUSCULAR; INTRAVENOUS at 17:25

## 2025-03-03 RX ADMIN — FENTANYL CITRATE 25 MCG: 50 INJECTION INTRAMUSCULAR; INTRAVENOUS at 17:28

## 2025-03-03 RX ADMIN — ATORVASTATIN CALCIUM 80 MG: 40 TABLET, FILM COATED ORAL at 21:10

## 2025-03-03 RX ADMIN — FENTANYL CITRATE 25 MCG: 50 INJECTION INTRAMUSCULAR; INTRAVENOUS at 19:05

## 2025-03-03 RX ADMIN — Medication 100 MCG: at 17:58

## 2025-03-03 RX ADMIN — FUROSEMIDE 20 MG: 20 TABLET ORAL at 08:43

## 2025-03-03 RX ADMIN — ASPIRIN 81 MG: 81 TABLET, COATED ORAL at 08:43

## 2025-03-03 RX ADMIN — HEPARIN SODIUM 18 UNITS/KG/HR: 10000 INJECTION, SOLUTION INTRAVENOUS at 23:47

## 2025-03-03 RX ADMIN — FAMOTIDINE 20 MG: 20 TABLET, FILM COATED ORAL at 15:19

## 2025-03-03 RX ADMIN — SODIUM CHLORIDE: 9 INJECTION, SOLUTION INTRAVENOUS at 17:08

## 2025-03-03 RX ADMIN — ESMOLOL HYDROCHLORIDE 5 MG: 10 INJECTION, SOLUTION INTRAVENOUS at 18:53

## 2025-03-03 RX ADMIN — ESMOLOL HYDROCHLORIDE 10 MG: 10 INJECTION, SOLUTION INTRAVENOUS at 19:04

## 2025-03-03 RX ADMIN — HYDROMORPHONE HYDROCHLORIDE 0.5 MG: 1 INJECTION, SOLUTION INTRAMUSCULAR; INTRAVENOUS; SUBCUTANEOUS at 12:06

## 2025-03-03 RX ADMIN — LIDOCAINE HYDROCHLORIDE 100 MG: 20 INJECTION, SOLUTION EPIDURAL; INFILTRATION; INTRACAUDAL; PERINEURAL at 17:22

## 2025-03-03 RX ADMIN — DEXAMETHASONE SODIUM PHOSPHATE 4 MG: 4 INJECTION INTRA-ARTICULAR; INTRALESIONAL; INTRAMUSCULAR; INTRAVENOUS; SOFT TISSUE at 17:34

## 2025-03-03 RX ADMIN — METOPROLOL TARTRATE 50 MG: 50 TABLET, FILM COATED ORAL at 21:10

## 2025-03-03 RX ADMIN — SODIUM CHLORIDE: 9 INJECTION, SOLUTION INTRAVENOUS at 15:09

## 2025-03-03 RX ADMIN — CILOSTAZOL 50 MG: 50 TABLET ORAL at 21:11

## 2025-03-03 RX ADMIN — HYDROMORPHONE HYDROCHLORIDE 0.5 MG: 1 INJECTION, SOLUTION INTRAMUSCULAR; INTRAVENOUS; SUBCUTANEOUS at 08:44

## 2025-03-03 RX ADMIN — Medication 100 MCG: at 18:12

## 2025-03-03 RX ADMIN — ESMOLOL HYDROCHLORIDE 10 MG: 10 INJECTION, SOLUTION INTRAVENOUS at 19:29

## 2025-03-03 RX ADMIN — SODIUM CHLORIDE, PRESERVATIVE FREE 10 ML: 5 INJECTION INTRAVENOUS at 21:11

## 2025-03-03 RX ADMIN — METOPROLOL TARTRATE 50 MG: 50 TABLET, FILM COATED ORAL at 08:43

## 2025-03-03 RX ADMIN — CEFAZOLIN 2 G: 1 INJECTION, POWDER, FOR SOLUTION INTRAMUSCULAR; INTRAVENOUS at 17:24

## 2025-03-03 RX ADMIN — PROPOFOL 70 MG: 10 INJECTION, EMULSION INTRAVENOUS at 17:23

## 2025-03-03 RX ADMIN — Medication 100 MCG: at 18:37

## 2025-03-03 RX ADMIN — PROPOFOL 70 MG: 10 INJECTION, EMULSION INTRAVENOUS at 17:22

## 2025-03-03 RX ADMIN — HEPARIN SODIUM 5000 UNITS: 1000 INJECTION INTRAVENOUS; SUBCUTANEOUS at 17:53

## 2025-03-03 RX ADMIN — HYDROMORPHONE HYDROCHLORIDE 0.5 MG: 1 INJECTION, SOLUTION INTRAMUSCULAR; INTRAVENOUS; SUBCUTANEOUS at 21:21

## 2025-03-03 RX ADMIN — CILOSTAZOL 50 MG: 50 TABLET ORAL at 08:43

## 2025-03-03 RX ADMIN — ESMOLOL HYDROCHLORIDE 10 MG: 10 INJECTION, SOLUTION INTRAVENOUS at 19:26

## 2025-03-03 RX ADMIN — Medication 100 MCG: at 19:13

## 2025-03-03 RX ADMIN — MIDAZOLAM 1 MG: 1 INJECTION, SOLUTION INTRAMUSCULAR; INTRAVENOUS at 17:10

## 2025-03-03 RX ADMIN — DILTIAZEM HYDROCHLORIDE 180 MG: 180 CAPSULE, EXTENDED RELEASE ORAL at 20:13

## 2025-03-03 RX ADMIN — FENTANYL CITRATE 25 MCG: 50 INJECTION INTRAMUSCULAR; INTRAVENOUS at 17:49

## 2025-03-03 RX ADMIN — ESMOLOL HYDROCHLORIDE 10 MG: 10 INJECTION, SOLUTION INTRAVENOUS at 19:19

## 2025-03-03 RX ADMIN — ESMOLOL HYDROCHLORIDE 5 MG: 10 INJECTION, SOLUTION INTRAVENOUS at 18:57

## 2025-03-03 RX ADMIN — ONDANSETRON 4 MG: 2 INJECTION INTRAMUSCULAR; INTRAVENOUS at 17:35

## 2025-03-03 RX ADMIN — SODIUM CHLORIDE, PRESERVATIVE FREE 10 ML: 5 INJECTION INTRAVENOUS at 09:38

## 2025-03-03 RX ADMIN — Medication 100 MCG: at 18:51

## 2025-03-03 RX ADMIN — PREGABALIN 75 MG: 25 CAPSULE ORAL at 21:10

## 2025-03-03 RX ADMIN — PREGABALIN 75 MG: 25 CAPSULE ORAL at 08:43

## 2025-03-03 RX ADMIN — Medication 100 MCG: at 17:57

## 2025-03-03 RX ADMIN — DILTIAZEM HYDROCHLORIDE 120 MG: 120 CAPSULE, COATED, EXTENDED RELEASE ORAL at 12:00

## 2025-03-03 RX ADMIN — ESMOLOL HYDROCHLORIDE 20 MG: 10 INJECTION, SOLUTION INTRAVENOUS at 19:48

## 2025-03-03 ASSESSMENT — PAIN SCALES - GENERAL
PAINLEVEL_OUTOF10: 9
PAINLEVEL_OUTOF10: 5
PAINLEVEL_OUTOF10: 4
PAINLEVEL_OUTOF10: 4
PAINLEVEL_OUTOF10: 7
PAINLEVEL_OUTOF10: 0
PAINLEVEL_OUTOF10: 2

## 2025-03-03 ASSESSMENT — PAIN DESCRIPTION - ORIENTATION
ORIENTATION: RIGHT

## 2025-03-03 ASSESSMENT — PAIN DESCRIPTION - DESCRIPTORS
DESCRIPTORS: BURNING;SHARP
DESCRIPTORS: TENDER;TINGLING
DESCRIPTORS: ACHING
DESCRIPTORS: BURNING;SHARP
DESCRIPTORS: TINGLING

## 2025-03-03 ASSESSMENT — PAIN DESCRIPTION - LOCATION
LOCATION: GROIN
LOCATION: FOOT
LOCATION: FOOT
LOCATION: GROIN
LOCATION: LEG

## 2025-03-03 ASSESSMENT — PAIN DESCRIPTION - PAIN TYPE
TYPE: SURGICAL PAIN
TYPE: SURGICAL PAIN

## 2025-03-03 ASSESSMENT — PAIN DESCRIPTION - FREQUENCY: FREQUENCY: INTERMITTENT

## 2025-03-03 ASSESSMENT — PAIN SCALES - WONG BAKER
WONGBAKER_NUMERICALRESPONSE: NO HURT
WONGBAKER_NUMERICALRESPONSE: NO HURT

## 2025-03-03 NOTE — ANESTHESIA PRE PROCEDURE
ventricular systolic function with a visually estimated EF of 50 - 55%. EF by 2D Simpsons Biplane is 52%. Left ventricle size is normal. Mild septal thickening. Normal wall motion. Indeterminate diastolic function due to atrial fibrillation.  Left Atrium Left atrial volume index is severely increased (>48 mL/m2). LA Vol Index A/L is 52 mL/m2.  Interatrial Septum No interatrial shunt visualized with color Doppler.  Right Ventricle Right ventricle is moderately dilated. Mildly reduced systolic function.  Right Atrium Right atrium is severely dilated.  Aortic Valve Trileaflet valve. No regurgitation. No stenosis.  Mitral Valve Valve structure is normal. Mild regurgitation. No stenosis noted.  Tricuspid Valve Valve structure is normal. Mild regurgitation. No stenosis noted. Normal RVSP. The estimated RVSP is 27 mmHg.  Pulmonic Valve Valve structure is normal. No regurgitation. No stenosis noted.  Aorta Normal sized aortic root and ascending aorta. Ao Root Index is 1.86 cm/m2. Ao Ascending Index is 1.81 cm/m2.  IVC/Hepatic Veins IVC diameter is less than or equal to 21 mm and decreases greater than 50% during inspiration; therefore the estimated right atrial pressure is normal (~3 mmHg).  Pericardium There is evidence of epicardial fat. No pericardial effusion.            Neuro/Psych:   (+) neuromuscular disease:            GI/Hepatic/Renal: Neg GI/Hepatic/Renal ROS            Endo/Other: Negative Endo/Other ROS                    Abdominal:             Vascular:          Other Findings:           Anesthesia Plan      general     ASA 3       Induction: intravenous.    MIPS: Postoperative opioids intended.  Anesthetic plan and risks discussed with patient.                      Amado Shin MD   3/3/2025

## 2025-03-04 LAB
APTT PPP: 103 SEC (ref 23–36.4)
APTT PPP: 94.7 SEC (ref 23–36.4)
APTT PPP: 95.7 SEC (ref 23–36.4)

## 2025-03-04 PROCEDURE — 94761 N-INVAS EAR/PLS OXIMETRY MLT: CPT

## 2025-03-04 PROCEDURE — 2500000003 HC RX 250 WO HCPCS: Performed by: SURGERY

## 2025-03-04 PROCEDURE — 99232 SBSQ HOSP IP/OBS MODERATE 35: CPT | Performed by: INTERNAL MEDICINE

## 2025-03-04 PROCEDURE — 6370000000 HC RX 637 (ALT 250 FOR IP): Performed by: SURGERY

## 2025-03-04 PROCEDURE — 6370000000 HC RX 637 (ALT 250 FOR IP)

## 2025-03-04 PROCEDURE — 6360000002 HC RX W HCPCS: Performed by: SURGERY

## 2025-03-04 PROCEDURE — 36415 COLL VENOUS BLD VENIPUNCTURE: CPT

## 2025-03-04 PROCEDURE — 85730 THROMBOPLASTIN TIME PARTIAL: CPT

## 2025-03-04 PROCEDURE — 2140000001 HC CVICU INTERMEDIATE R&B

## 2025-03-04 RX ADMIN — FUROSEMIDE 20 MG: 20 TABLET ORAL at 08:02

## 2025-03-04 RX ADMIN — DILTIAZEM HYDROCHLORIDE 180 MG: 180 CAPSULE, EXTENDED RELEASE ORAL at 17:52

## 2025-03-04 RX ADMIN — HEPARIN SODIUM 18 UNITS/KG/HR: 10000 INJECTION, SOLUTION INTRAVENOUS at 15:26

## 2025-03-04 RX ADMIN — DILTIAZEM HYDROCHLORIDE 120 MG: 120 CAPSULE, COATED, EXTENDED RELEASE ORAL at 08:03

## 2025-03-04 RX ADMIN — SODIUM CHLORIDE, PRESERVATIVE FREE 10 ML: 5 INJECTION INTRAVENOUS at 08:09

## 2025-03-04 RX ADMIN — PREGABALIN 75 MG: 25 CAPSULE ORAL at 08:02

## 2025-03-04 RX ADMIN — CILOSTAZOL 50 MG: 50 TABLET ORAL at 08:03

## 2025-03-04 RX ADMIN — CILOSTAZOL 50 MG: 50 TABLET ORAL at 21:31

## 2025-03-04 RX ADMIN — PREGABALIN 75 MG: 25 CAPSULE ORAL at 21:31

## 2025-03-04 RX ADMIN — ASPIRIN 81 MG: 81 TABLET, COATED ORAL at 08:02

## 2025-03-04 RX ADMIN — ATORVASTATIN CALCIUM 80 MG: 40 TABLET, FILM COATED ORAL at 21:31

## 2025-03-04 RX ADMIN — HYDROMORPHONE HYDROCHLORIDE 0.5 MG: 1 INJECTION, SOLUTION INTRAMUSCULAR; INTRAVENOUS; SUBCUTANEOUS at 03:19

## 2025-03-04 RX ADMIN — HYDROMORPHONE HYDROCHLORIDE 0.5 MG: 1 INJECTION, SOLUTION INTRAMUSCULAR; INTRAVENOUS; SUBCUTANEOUS at 10:06

## 2025-03-04 RX ADMIN — HYDROMORPHONE HYDROCHLORIDE 0.5 MG: 1 INJECTION, SOLUTION INTRAMUSCULAR; INTRAVENOUS; SUBCUTANEOUS at 20:07

## 2025-03-04 ASSESSMENT — PAIN - FUNCTIONAL ASSESSMENT: PAIN_FUNCTIONAL_ASSESSMENT: PREVENTS OR INTERFERES SOME ACTIVE ACTIVITIES AND ADLS

## 2025-03-04 ASSESSMENT — PAIN DESCRIPTION - PAIN TYPE
TYPE: SURGICAL PAIN
TYPE: SURGICAL PAIN

## 2025-03-04 ASSESSMENT — PAIN SCALES - GENERAL
PAINLEVEL_OUTOF10: 7
PAINLEVEL_OUTOF10: 0
PAINLEVEL_OUTOF10: 2
PAINLEVEL_OUTOF10: 7
PAINLEVEL_OUTOF10: 0
PAINLEVEL_OUTOF10: 7

## 2025-03-04 ASSESSMENT — PAIN DESCRIPTION - LOCATION
LOCATION: LEG
LOCATION: LEG
LOCATION: INCISION

## 2025-03-04 ASSESSMENT — PAIN DESCRIPTION - ORIENTATION
ORIENTATION: RIGHT

## 2025-03-04 ASSESSMENT — PAIN DESCRIPTION - FREQUENCY
FREQUENCY: INTERMITTENT
FREQUENCY: INTERMITTENT

## 2025-03-04 ASSESSMENT — PAIN DESCRIPTION - DESCRIPTORS
DESCRIPTORS: ACHING;SORE
DESCRIPTORS: ACHING;SHARP
DESCRIPTORS: ACHING

## 2025-03-04 ASSESSMENT — PAIN DESCRIPTION - ONSET: ONSET: ON-GOING

## 2025-03-05 ENCOUNTER — APPOINTMENT (OUTPATIENT)
Facility: HOSPITAL | Age: 65
DRG: 253 | End: 2025-03-05
Attending: SURGERY
Payer: MEDICARE

## 2025-03-05 LAB
ANION GAP SERPL CALC-SCNC: 7 MMOL/L (ref 3–18)
APTT PPP: 117.9 SEC (ref 23–36.4)
APTT PPP: 121.5 SEC (ref 23–36.4)
APTT PPP: 65.1 SEC (ref 23–36.4)
BUN SERPL-MCNC: 17 MG/DL (ref 7–18)
BUN/CREAT SERPL: 16 (ref 12–20)
CALCIUM SERPL-MCNC: 8.5 MG/DL (ref 8.5–10.1)
CHLORIDE SERPL-SCNC: 109 MMOL/L (ref 100–111)
CO2 SERPL-SCNC: 24 MMOL/L (ref 21–32)
CREAT SERPL-MCNC: 1.08 MG/DL (ref 0.6–1.3)
ECHO BSA: 2.09 M2
ERYTHROCYTE [DISTWIDTH] IN BLOOD BY AUTOMATED COUNT: 16.4 % (ref 11.6–14.5)
GLUCOSE SERPL-MCNC: 136 MG/DL (ref 74–99)
HCT VFR BLD AUTO: 34.3 % (ref 36–48)
HGB BLD-MCNC: 11.4 G/DL (ref 13–16)
MAGNESIUM SERPL-MCNC: 2 MG/DL (ref 1.6–2.6)
MCH RBC QN AUTO: 30.4 PG (ref 24–34)
MCHC RBC AUTO-ENTMCNC: 33.2 G/DL (ref 31–37)
MCV RBC AUTO: 91.5 FL (ref 78–100)
NRBC # BLD: 0 K/UL (ref 0–0.01)
NRBC BLD-RTO: 0 PER 100 WBC
PLATELET # BLD AUTO: 323 K/UL (ref 135–420)
PMV BLD AUTO: 10 FL (ref 9.2–11.8)
POTASSIUM SERPL-SCNC: 3.4 MMOL/L (ref 3.5–5.5)
RBC # BLD AUTO: 3.75 M/UL (ref 4.35–5.65)
SODIUM SERPL-SCNC: 140 MMOL/L (ref 136–145)
VAS LEFT ARM BP: 144 MMHG
VAS RIGHT ABI: 0.76
VAS RIGHT ARM BP: 134 MMHG
VAS RIGHT ARTERIAL PROX ANASTOMOSIS EDV: 12.8 CM/S
VAS RIGHT ARTERIAL PROX ANASTOMOSIS PSV: 71 CM/S
VAS RIGHT ATA DIST PSV: 92 CM/S
VAS RIGHT ATA MID PSV: 102.5 CM/S
VAS RIGHT ATA PROX PSV: 184.9 CM/S
VAS RIGHT DIST OUTFLOW EDV: 8.9 CM/S
VAS RIGHT DIST OUTFLOW PSV: 41.3 CM/S
VAS RIGHT DORSALIS PEDIS BP: 109 MMHG
VAS RIGHT GRAFT 1: NORMAL
VAS RIGHT INFLOW ARTERY EDV: 3.2 CM/S
VAS RIGHT INFLOW ARTERY PSV: 29.6 CM/S
VAS RIGHT MID OUTFLOW EDV: 10.2 CM/S
VAS RIGHT MID OUTFLOW PSV: 49 CM/S
VAS RIGHT OUTFLOW VESSEL EDV: 15.4 CM/S
VAS RIGHT OUTFLOW VESSEL PSV: 58.1 CM/S
VAS RIGHT PROX OUTFLOW EDV: 8.9 CM/S
VAS RIGHT PROX OUTFLOW PSV: 41.3 CM/S
VAS RIGHT PTA BP: 93 MMHG
VAS RIGHT PTA DIST PSV: 18.6 CM/S
VAS RIGHT PTA MID PSV: 21.3 CM/S
VAS RIGHT TBI: 0.36
VAS RIGHT TOE PRESSURE: 52 MMHG
VAS RIGHT VENOUS DIST ANASTOMOSIS EDV: 10.2 CM/S
VAS RIGHT VENOUS DIST ANASTOMOSIS PSV: 50.3 CM/S
WBC # BLD AUTO: 11.5 K/UL (ref 4.6–13.2)

## 2025-03-05 PROCEDURE — 93926 LOWER EXTREMITY STUDY: CPT

## 2025-03-05 PROCEDURE — 6370000000 HC RX 637 (ALT 250 FOR IP)

## 2025-03-05 PROCEDURE — 6360000002 HC RX W HCPCS: Performed by: SURGERY

## 2025-03-05 PROCEDURE — 85027 COMPLETE CBC AUTOMATED: CPT

## 2025-03-05 PROCEDURE — 94761 N-INVAS EAR/PLS OXIMETRY MLT: CPT

## 2025-03-05 PROCEDURE — 2140000001 HC CVICU INTERMEDIATE R&B

## 2025-03-05 PROCEDURE — 93926 LOWER EXTREMITY STUDY: CPT | Performed by: INTERNAL MEDICINE

## 2025-03-05 PROCEDURE — 36415 COLL VENOUS BLD VENIPUNCTURE: CPT

## 2025-03-05 PROCEDURE — 2500000003 HC RX 250 WO HCPCS: Performed by: SURGERY

## 2025-03-05 PROCEDURE — 6370000000 HC RX 637 (ALT 250 FOR IP): Performed by: SURGERY

## 2025-03-05 PROCEDURE — 85730 THROMBOPLASTIN TIME PARTIAL: CPT

## 2025-03-05 PROCEDURE — 83735 ASSAY OF MAGNESIUM: CPT

## 2025-03-05 PROCEDURE — 80048 BASIC METABOLIC PNL TOTAL CA: CPT

## 2025-03-05 RX ADMIN — FUROSEMIDE 20 MG: 20 TABLET ORAL at 09:41

## 2025-03-05 RX ADMIN — HYDROMORPHONE HYDROCHLORIDE 0.5 MG: 1 INJECTION, SOLUTION INTRAMUSCULAR; INTRAVENOUS; SUBCUTANEOUS at 15:23

## 2025-03-05 RX ADMIN — SODIUM CHLORIDE, PRESERVATIVE FREE 10 ML: 5 INJECTION INTRAVENOUS at 21:09

## 2025-03-05 RX ADMIN — HEPARIN SODIUM 16 UNITS/KG/HR: 10000 INJECTION, SOLUTION INTRAVENOUS at 09:47

## 2025-03-05 RX ADMIN — DILTIAZEM HYDROCHLORIDE 180 MG: 180 CAPSULE, EXTENDED RELEASE ORAL at 17:32

## 2025-03-05 RX ADMIN — ATORVASTATIN CALCIUM 80 MG: 40 TABLET, FILM COATED ORAL at 21:07

## 2025-03-05 RX ADMIN — SODIUM CHLORIDE, PRESERVATIVE FREE 10 ML: 5 INJECTION INTRAVENOUS at 09:43

## 2025-03-05 RX ADMIN — HEPARIN SODIUM 3600 UNITS: 1000 INJECTION INTRAVENOUS; SUBCUTANEOUS at 15:22

## 2025-03-05 RX ADMIN — POTASSIUM CHLORIDE 40 MEQ: 1500 TABLET, EXTENDED RELEASE ORAL at 23:23

## 2025-03-05 RX ADMIN — DILTIAZEM HYDROCHLORIDE 120 MG: 120 CAPSULE, COATED, EXTENDED RELEASE ORAL at 09:41

## 2025-03-05 RX ADMIN — CILOSTAZOL 50 MG: 50 TABLET ORAL at 21:08

## 2025-03-05 RX ADMIN — METOPROLOL TARTRATE 50 MG: 50 TABLET, FILM COATED ORAL at 21:08

## 2025-03-05 RX ADMIN — CILOSTAZOL 50 MG: 50 TABLET ORAL at 09:40

## 2025-03-05 RX ADMIN — ASPIRIN 81 MG: 81 TABLET, COATED ORAL at 09:40

## 2025-03-05 RX ADMIN — PREGABALIN 75 MG: 25 CAPSULE ORAL at 21:08

## 2025-03-05 RX ADMIN — PREGABALIN 75 MG: 25 CAPSULE ORAL at 09:41

## 2025-03-05 RX ADMIN — HYDROMORPHONE HYDROCHLORIDE 0.5 MG: 1 INJECTION, SOLUTION INTRAMUSCULAR; INTRAVENOUS; SUBCUTANEOUS at 21:05

## 2025-03-05 ASSESSMENT — PAIN DESCRIPTION - PAIN TYPE: TYPE: SURGICAL PAIN

## 2025-03-05 ASSESSMENT — PAIN SCALES - GENERAL
PAINLEVEL_OUTOF10: 9
PAINLEVEL_OUTOF10: 7
PAINLEVEL_OUTOF10: 0
PAINLEVEL_OUTOF10: 8

## 2025-03-05 ASSESSMENT — PAIN SCALES - WONG BAKER: WONGBAKER_NUMERICALRESPONSE: NO HURT

## 2025-03-05 ASSESSMENT — PAIN DESCRIPTION - ORIENTATION
ORIENTATION: RIGHT
ORIENTATION: RIGHT

## 2025-03-05 ASSESSMENT — PAIN DESCRIPTION - ONSET: ONSET: ON-GOING

## 2025-03-05 ASSESSMENT — PAIN - FUNCTIONAL ASSESSMENT: PAIN_FUNCTIONAL_ASSESSMENT: ACTIVITIES ARE NOT PREVENTED

## 2025-03-05 ASSESSMENT — PAIN DESCRIPTION - LOCATION
LOCATION: LEG
LOCATION: FOOT

## 2025-03-05 ASSESSMENT — PAIN DESCRIPTION - DESCRIPTORS
DESCRIPTORS: ACHING;SHARP
DESCRIPTORS: ACHING

## 2025-03-05 ASSESSMENT — PAIN DESCRIPTION - FREQUENCY: FREQUENCY: INTERMITTENT

## 2025-03-05 NOTE — ANESTHESIA POSTPROCEDURE EVALUATION
Department of Anesthesiology  Postprocedure Note    Patient: Jorden Artis  MRN: [id number]  YOB: 1960  Date of evaluation: 3/5/2025    Procedure Summary       Date: 03/03/25 Room / Location: [facility] 02 / [facility] CARDIAC SURGERY    Anesthesia Start: 1708 Anesthesia Stop: 1952    Procedure: RIGHT LEG ANGIOGRAM WITH THROMBECTOMY, ANGIOPLASTY AND STENTING (Right) Diagnosis:       Occlusion of femoral vein (HCC)      (Occlusion of femoral vein (HCC) [I82.419])    Surgeons: Ayad Sesay MD Responsible Provider: Amado Shin MD    Anesthesia Type: General ASA Status: 3            Anesthesia Type: General    Estefania Phase I: Estefania Score: 10    Estefania Phase II:      Anesthesia Post Evaluation    Patient location during evaluation: bedside  Patient participation: complete - patient participated  Level of consciousness: awake  Airway patency: patent  Nausea & Vomiting: no nausea  Cardiovascular status: hemodynamically stable  Respiratory status: acceptable  Hydration status: euvolemic  Multimodal analgesia pain management approach  Pain management: adequate    No notable events documented.

## 2025-03-06 LAB
ANION GAP SERPL CALC-SCNC: 6 MMOL/L (ref 3–18)
APTT PPP: 141.3 SEC (ref 23–36.4)
APTT PPP: 71.2 SEC (ref 23–36.4)
APTT PPP: 95.3 SEC (ref 23–36.4)
BUN SERPL-MCNC: 15 MG/DL (ref 7–18)
BUN/CREAT SERPL: 14 (ref 12–20)
CALCIUM SERPL-MCNC: 8.6 MG/DL (ref 8.5–10.1)
CHLORIDE SERPL-SCNC: 110 MMOL/L (ref 100–111)
CO2 SERPL-SCNC: 24 MMOL/L (ref 21–32)
CREAT SERPL-MCNC: 1.05 MG/DL (ref 0.6–1.3)
GLUCOSE SERPL-MCNC: 96 MG/DL (ref 74–99)
POTASSIUM SERPL-SCNC: 4.2 MMOL/L (ref 3.5–5.5)
SODIUM SERPL-SCNC: 140 MMOL/L (ref 136–145)

## 2025-03-06 PROCEDURE — 2500000003 HC RX 250 WO HCPCS: Performed by: SURGERY

## 2025-03-06 PROCEDURE — 6360000002 HC RX W HCPCS: Performed by: SURGERY

## 2025-03-06 PROCEDURE — 6370000000 HC RX 637 (ALT 250 FOR IP): Performed by: SURGERY

## 2025-03-06 PROCEDURE — 6370000000 HC RX 637 (ALT 250 FOR IP)

## 2025-03-06 PROCEDURE — 36415 COLL VENOUS BLD VENIPUNCTURE: CPT

## 2025-03-06 PROCEDURE — 94761 N-INVAS EAR/PLS OXIMETRY MLT: CPT

## 2025-03-06 PROCEDURE — 80048 BASIC METABOLIC PNL TOTAL CA: CPT

## 2025-03-06 PROCEDURE — 2140000001 HC CVICU INTERMEDIATE R&B

## 2025-03-06 PROCEDURE — 85730 THROMBOPLASTIN TIME PARTIAL: CPT

## 2025-03-06 RX ADMIN — HEPARIN SODIUM 16 UNITS/KG/HR: 10000 INJECTION, SOLUTION INTRAVENOUS at 02:26

## 2025-03-06 RX ADMIN — CILOSTAZOL 50 MG: 50 TABLET ORAL at 20:50

## 2025-03-06 RX ADMIN — ATORVASTATIN CALCIUM 80 MG: 40 TABLET, FILM COATED ORAL at 20:50

## 2025-03-06 RX ADMIN — PREGABALIN 75 MG: 25 CAPSULE ORAL at 20:50

## 2025-03-06 RX ADMIN — PREGABALIN 75 MG: 25 CAPSULE ORAL at 08:47

## 2025-03-06 RX ADMIN — FUROSEMIDE 20 MG: 20 TABLET ORAL at 08:47

## 2025-03-06 RX ADMIN — DILTIAZEM HYDROCHLORIDE 120 MG: 120 CAPSULE, COATED, EXTENDED RELEASE ORAL at 08:47

## 2025-03-06 RX ADMIN — SODIUM CHLORIDE, PRESERVATIVE FREE 10 ML: 5 INJECTION INTRAVENOUS at 08:48

## 2025-03-06 RX ADMIN — ASPIRIN 81 MG: 81 TABLET, COATED ORAL at 08:47

## 2025-03-06 RX ADMIN — DILTIAZEM HYDROCHLORIDE 180 MG: 180 CAPSULE, EXTENDED RELEASE ORAL at 17:31

## 2025-03-06 RX ADMIN — METOPROLOL TARTRATE 50 MG: 50 TABLET, FILM COATED ORAL at 08:47

## 2025-03-06 RX ADMIN — CILOSTAZOL 50 MG: 50 TABLET ORAL at 08:47

## 2025-03-06 RX ADMIN — HEPARIN SODIUM 15 UNITS/KG/HR: 10000 INJECTION, SOLUTION INTRAVENOUS at 22:22

## 2025-03-06 RX ADMIN — METOPROLOL TARTRATE 50 MG: 50 TABLET, FILM COATED ORAL at 20:50

## 2025-03-06 RX ADMIN — HYDROMORPHONE HYDROCHLORIDE 0.5 MG: 1 INJECTION, SOLUTION INTRAMUSCULAR; INTRAVENOUS; SUBCUTANEOUS at 19:48

## 2025-03-06 RX ADMIN — SODIUM CHLORIDE, PRESERVATIVE FREE 10 ML: 5 INJECTION INTRAVENOUS at 20:54

## 2025-03-06 RX ADMIN — HEPARIN SODIUM 3600 UNITS: 1000 INJECTION INTRAVENOUS; SUBCUTANEOUS at 11:38

## 2025-03-06 ASSESSMENT — PAIN SCALES - GENERAL
PAINLEVEL_OUTOF10: 0
PAINLEVEL_OUTOF10: 0
PAINLEVEL_OUTOF10: 8
PAINLEVEL_OUTOF10: 2
PAINLEVEL_OUTOF10: 0

## 2025-03-06 ASSESSMENT — PAIN DESCRIPTION - ONSET: ONSET: ON-GOING

## 2025-03-06 ASSESSMENT — PAIN DESCRIPTION - ORIENTATION: ORIENTATION: RIGHT

## 2025-03-06 ASSESSMENT — PAIN DESCRIPTION - LOCATION: LOCATION: LEG

## 2025-03-06 ASSESSMENT — PAIN DESCRIPTION - FREQUENCY: FREQUENCY: INTERMITTENT

## 2025-03-06 ASSESSMENT — PAIN - FUNCTIONAL ASSESSMENT: PAIN_FUNCTIONAL_ASSESSMENT: ACTIVITIES ARE NOT PREVENTED

## 2025-03-06 ASSESSMENT — PAIN DESCRIPTION - PAIN TYPE: TYPE: SURGICAL PAIN

## 2025-03-06 ASSESSMENT — PAIN DESCRIPTION - DESCRIPTORS: DESCRIPTORS: ACHING;SHARP

## 2025-03-07 ENCOUNTER — ANESTHESIA EVENT (OUTPATIENT)
Dept: CARDIOTHORACIC SURGERY | Facility: HOSPITAL | Age: 65
DRG: 253 | End: 2025-03-07
Payer: MEDICARE

## 2025-03-07 LAB
ANION GAP SERPL CALC-SCNC: 7 MMOL/L (ref 3–18)
APTT PPP: 81.7 SEC (ref 23–36.4)
APTT PPP: 88 SEC (ref 23–36.4)
BUN SERPL-MCNC: 13 MG/DL (ref 7–18)
BUN/CREAT SERPL: 13 (ref 12–20)
CALCIUM SERPL-MCNC: 8.9 MG/DL (ref 8.5–10.1)
CHLORIDE SERPL-SCNC: 105 MMOL/L (ref 100–111)
CO2 SERPL-SCNC: 26 MMOL/L (ref 21–32)
CREAT SERPL-MCNC: 1.01 MG/DL (ref 0.6–1.3)
ERYTHROCYTE [DISTWIDTH] IN BLOOD BY AUTOMATED COUNT: 16.1 % (ref 11.6–14.5)
GLUCOSE SERPL-MCNC: 108 MG/DL (ref 74–99)
HCT VFR BLD AUTO: 38.5 % (ref 36–48)
HGB BLD-MCNC: 12.7 G/DL (ref 13–16)
MCH RBC QN AUTO: 30.2 PG (ref 24–34)
MCHC RBC AUTO-ENTMCNC: 33 G/DL (ref 31–37)
MCV RBC AUTO: 91.4 FL (ref 78–100)
NRBC # BLD: 0 K/UL (ref 0–0.01)
NRBC BLD-RTO: 0 PER 100 WBC
PLATELET # BLD AUTO: 261 K/UL (ref 135–420)
PMV BLD AUTO: 10.8 FL (ref 9.2–11.8)
POTASSIUM SERPL-SCNC: 3.5 MMOL/L (ref 3.5–5.5)
RBC # BLD AUTO: 4.21 M/UL (ref 4.35–5.65)
SODIUM SERPL-SCNC: 138 MMOL/L (ref 136–145)
WBC # BLD AUTO: 11.8 K/UL (ref 4.6–13.2)

## 2025-03-07 PROCEDURE — 6360000002 HC RX W HCPCS: Performed by: SURGERY

## 2025-03-07 PROCEDURE — 80048 BASIC METABOLIC PNL TOTAL CA: CPT

## 2025-03-07 PROCEDURE — 6370000000 HC RX 637 (ALT 250 FOR IP): Performed by: SURGERY

## 2025-03-07 PROCEDURE — 36415 COLL VENOUS BLD VENIPUNCTURE: CPT

## 2025-03-07 PROCEDURE — 2140000001 HC CVICU INTERMEDIATE R&B

## 2025-03-07 PROCEDURE — 2500000003 HC RX 250 WO HCPCS: Performed by: SURGERY

## 2025-03-07 PROCEDURE — 94761 N-INVAS EAR/PLS OXIMETRY MLT: CPT

## 2025-03-07 PROCEDURE — 85730 THROMBOPLASTIN TIME PARTIAL: CPT

## 2025-03-07 PROCEDURE — 6370000000 HC RX 637 (ALT 250 FOR IP)

## 2025-03-07 PROCEDURE — 85027 COMPLETE CBC AUTOMATED: CPT

## 2025-03-07 RX ADMIN — ASPIRIN 81 MG: 81 TABLET, COATED ORAL at 09:37

## 2025-03-07 RX ADMIN — HYDROMORPHONE HYDROCHLORIDE 0.5 MG: 1 INJECTION, SOLUTION INTRAMUSCULAR; INTRAVENOUS; SUBCUTANEOUS at 21:03

## 2025-03-07 RX ADMIN — SODIUM CHLORIDE, PRESERVATIVE FREE 10 ML: 5 INJECTION INTRAVENOUS at 21:03

## 2025-03-07 RX ADMIN — PREGABALIN 75 MG: 25 CAPSULE ORAL at 09:35

## 2025-03-07 RX ADMIN — POTASSIUM BICARBONATE 40 MEQ: 782 TABLET, EFFERVESCENT ORAL at 15:18

## 2025-03-07 RX ADMIN — ATORVASTATIN CALCIUM 80 MG: 40 TABLET, FILM COATED ORAL at 21:01

## 2025-03-07 RX ADMIN — CILOSTAZOL 50 MG: 50 TABLET ORAL at 21:02

## 2025-03-07 RX ADMIN — DILTIAZEM HYDROCHLORIDE 120 MG: 120 CAPSULE, COATED, EXTENDED RELEASE ORAL at 09:36

## 2025-03-07 RX ADMIN — CILOSTAZOL 50 MG: 50 TABLET ORAL at 09:37

## 2025-03-07 RX ADMIN — METOPROLOL TARTRATE 50 MG: 50 TABLET, FILM COATED ORAL at 09:36

## 2025-03-07 RX ADMIN — PREGABALIN 75 MG: 25 CAPSULE ORAL at 21:01

## 2025-03-07 RX ADMIN — SODIUM CHLORIDE, PRESERVATIVE FREE 10 ML: 5 INJECTION INTRAVENOUS at 09:38

## 2025-03-07 RX ADMIN — FUROSEMIDE 20 MG: 20 TABLET ORAL at 09:36

## 2025-03-07 RX ADMIN — HYDROMORPHONE HYDROCHLORIDE 0.5 MG: 1 INJECTION, SOLUTION INTRAMUSCULAR; INTRAVENOUS; SUBCUTANEOUS at 15:22

## 2025-03-07 RX ADMIN — DILTIAZEM HYDROCHLORIDE 180 MG: 180 CAPSULE, EXTENDED RELEASE ORAL at 18:15

## 2025-03-07 RX ADMIN — HEPARIN SODIUM 15 UNITS/KG/HR: 10000 INJECTION, SOLUTION INTRAVENOUS at 18:41

## 2025-03-07 RX ADMIN — METOPROLOL TARTRATE 50 MG: 50 TABLET, FILM COATED ORAL at 21:02

## 2025-03-07 ASSESSMENT — PAIN SCALES - WONG BAKER
WONGBAKER_NUMERICALRESPONSE: NO HURT

## 2025-03-07 ASSESSMENT — PAIN DESCRIPTION - DESCRIPTORS
DESCRIPTORS: ACHING
DESCRIPTORS: ACHING

## 2025-03-07 ASSESSMENT — PAIN DESCRIPTION - LOCATION
LOCATION: LEG
LOCATION: LEG

## 2025-03-07 ASSESSMENT — PAIN SCALES - GENERAL
PAINLEVEL_OUTOF10: 8
PAINLEVEL_OUTOF10: 0
PAINLEVEL_OUTOF10: 0
PAINLEVEL_OUTOF10: 5
PAINLEVEL_OUTOF10: 8

## 2025-03-07 ASSESSMENT — PAIN DESCRIPTION - ORIENTATION
ORIENTATION: RIGHT
ORIENTATION: RIGHT

## 2025-03-08 LAB — APTT PPP: 79.1 SEC (ref 23–36.4)

## 2025-03-08 PROCEDURE — 6370000000 HC RX 637 (ALT 250 FOR IP): Performed by: SURGERY

## 2025-03-08 PROCEDURE — 6360000002 HC RX W HCPCS: Performed by: SURGERY

## 2025-03-08 PROCEDURE — 85730 THROMBOPLASTIN TIME PARTIAL: CPT

## 2025-03-08 PROCEDURE — 6370000000 HC RX 637 (ALT 250 FOR IP)

## 2025-03-08 PROCEDURE — 94761 N-INVAS EAR/PLS OXIMETRY MLT: CPT

## 2025-03-08 PROCEDURE — 36415 COLL VENOUS BLD VENIPUNCTURE: CPT

## 2025-03-08 PROCEDURE — 2500000003 HC RX 250 WO HCPCS: Performed by: SURGERY

## 2025-03-08 PROCEDURE — 2140000001 HC CVICU INTERMEDIATE R&B

## 2025-03-08 RX ADMIN — CILOSTAZOL 50 MG: 50 TABLET ORAL at 08:29

## 2025-03-08 RX ADMIN — METOPROLOL TARTRATE 50 MG: 50 TABLET, FILM COATED ORAL at 20:27

## 2025-03-08 RX ADMIN — METOPROLOL TARTRATE 50 MG: 50 TABLET, FILM COATED ORAL at 08:29

## 2025-03-08 RX ADMIN — SODIUM CHLORIDE, PRESERVATIVE FREE 10 ML: 5 INJECTION INTRAVENOUS at 08:30

## 2025-03-08 RX ADMIN — HYDROMORPHONE HYDROCHLORIDE 0.5 MG: 1 INJECTION, SOLUTION INTRAMUSCULAR; INTRAVENOUS; SUBCUTANEOUS at 16:15

## 2025-03-08 RX ADMIN — ASPIRIN 81 MG: 81 TABLET, COATED ORAL at 08:29

## 2025-03-08 RX ADMIN — FUROSEMIDE 20 MG: 20 TABLET ORAL at 08:29

## 2025-03-08 RX ADMIN — DILTIAZEM HYDROCHLORIDE 180 MG: 180 CAPSULE, EXTENDED RELEASE ORAL at 17:41

## 2025-03-08 RX ADMIN — HYDROMORPHONE HYDROCHLORIDE 0.5 MG: 1 INJECTION, SOLUTION INTRAMUSCULAR; INTRAVENOUS; SUBCUTANEOUS at 20:29

## 2025-03-08 RX ADMIN — SODIUM CHLORIDE, PRESERVATIVE FREE 10 ML: 5 INJECTION INTRAVENOUS at 20:39

## 2025-03-08 RX ADMIN — DILTIAZEM HYDROCHLORIDE 120 MG: 120 CAPSULE, COATED, EXTENDED RELEASE ORAL at 08:29

## 2025-03-08 RX ADMIN — ATORVASTATIN CALCIUM 80 MG: 40 TABLET, FILM COATED ORAL at 20:27

## 2025-03-08 RX ADMIN — PREGABALIN 75 MG: 25 CAPSULE ORAL at 20:27

## 2025-03-08 RX ADMIN — PREGABALIN 75 MG: 25 CAPSULE ORAL at 08:29

## 2025-03-08 RX ADMIN — HEPARIN SODIUM 15 UNITS/KG/HR: 10000 INJECTION, SOLUTION INTRAVENOUS at 13:28

## 2025-03-08 RX ADMIN — CILOSTAZOL 50 MG: 50 TABLET ORAL at 20:27

## 2025-03-08 ASSESSMENT — PAIN DESCRIPTION - LOCATION
LOCATION: LEG
LOCATION: FOOT

## 2025-03-08 ASSESSMENT — PAIN DESCRIPTION - DESCRIPTORS
DESCRIPTORS: ACHING
DESCRIPTORS: ACHING

## 2025-03-08 ASSESSMENT — PAIN DESCRIPTION - ORIENTATION
ORIENTATION: RIGHT
ORIENTATION: RIGHT

## 2025-03-08 ASSESSMENT — PAIN - FUNCTIONAL ASSESSMENT: PAIN_FUNCTIONAL_ASSESSMENT: ACTIVITIES ARE NOT PREVENTED

## 2025-03-08 ASSESSMENT — PAIN SCALES - GENERAL
PAINLEVEL_OUTOF10: 7
PAINLEVEL_OUTOF10: 0
PAINLEVEL_OUTOF10: 7
PAINLEVEL_OUTOF10: 0

## 2025-03-08 ASSESSMENT — PAIN SCALES - WONG BAKER
WONGBAKER_NUMERICALRESPONSE: NO HURT
WONGBAKER_NUMERICALRESPONSE: NO HURT

## 2025-03-09 LAB
APTT PPP: 120 SEC (ref 23–36.4)
APTT PPP: 54.2 SEC (ref 23–36.4)
ERYTHROCYTE [DISTWIDTH] IN BLOOD BY AUTOMATED COUNT: 15.9 % (ref 11.6–14.5)
HCT VFR BLD AUTO: 40.1 % (ref 36–48)
HGB BLD-MCNC: 13.2 G/DL (ref 13–16)
MCH RBC QN AUTO: 29.9 PG (ref 24–34)
MCHC RBC AUTO-ENTMCNC: 32.9 G/DL (ref 31–37)
MCV RBC AUTO: 90.7 FL (ref 78–100)
NRBC # BLD: 0 K/UL (ref 0–0.01)
NRBC BLD-RTO: 0 PER 100 WBC
PLATELET # BLD AUTO: 376 K/UL (ref 135–420)
PMV BLD AUTO: 9.8 FL (ref 9.2–11.8)
RBC # BLD AUTO: 4.42 M/UL (ref 4.35–5.65)
WBC # BLD AUTO: 12.8 K/UL (ref 4.6–13.2)

## 2025-03-09 PROCEDURE — 6370000000 HC RX 637 (ALT 250 FOR IP)

## 2025-03-09 PROCEDURE — 94761 N-INVAS EAR/PLS OXIMETRY MLT: CPT

## 2025-03-09 PROCEDURE — 2500000003 HC RX 250 WO HCPCS: Performed by: SURGERY

## 2025-03-09 PROCEDURE — 6370000000 HC RX 637 (ALT 250 FOR IP): Performed by: SURGERY

## 2025-03-09 PROCEDURE — 6360000002 HC RX W HCPCS: Performed by: SURGERY

## 2025-03-09 PROCEDURE — 85730 THROMBOPLASTIN TIME PARTIAL: CPT

## 2025-03-09 PROCEDURE — 85027 COMPLETE CBC AUTOMATED: CPT

## 2025-03-09 PROCEDURE — 2140000001 HC CVICU INTERMEDIATE R&B

## 2025-03-09 RX ADMIN — ATORVASTATIN CALCIUM 80 MG: 40 TABLET, FILM COATED ORAL at 20:48

## 2025-03-09 RX ADMIN — DILTIAZEM HYDROCHLORIDE 120 MG: 120 CAPSULE, COATED, EXTENDED RELEASE ORAL at 08:50

## 2025-03-09 RX ADMIN — CILOSTAZOL 50 MG: 50 TABLET ORAL at 08:50

## 2025-03-09 RX ADMIN — HYDROMORPHONE HYDROCHLORIDE 0.5 MG: 1 INJECTION, SOLUTION INTRAMUSCULAR; INTRAVENOUS; SUBCUTANEOUS at 05:25

## 2025-03-09 RX ADMIN — METOPROLOL TARTRATE 50 MG: 50 TABLET, FILM COATED ORAL at 20:49

## 2025-03-09 RX ADMIN — PREGABALIN 75 MG: 25 CAPSULE ORAL at 20:48

## 2025-03-09 RX ADMIN — ASPIRIN 81 MG: 81 TABLET, COATED ORAL at 08:50

## 2025-03-09 RX ADMIN — CILOSTAZOL 50 MG: 50 TABLET ORAL at 20:48

## 2025-03-09 RX ADMIN — FUROSEMIDE 20 MG: 20 TABLET ORAL at 08:50

## 2025-03-09 RX ADMIN — HYDROMORPHONE HYDROCHLORIDE 0.5 MG: 1 INJECTION, SOLUTION INTRAMUSCULAR; INTRAVENOUS; SUBCUTANEOUS at 15:58

## 2025-03-09 RX ADMIN — DILTIAZEM HYDROCHLORIDE 180 MG: 180 CAPSULE, EXTENDED RELEASE ORAL at 18:02

## 2025-03-09 RX ADMIN — HYDROMORPHONE HYDROCHLORIDE 0.5 MG: 1 INJECTION, SOLUTION INTRAMUSCULAR; INTRAVENOUS; SUBCUTANEOUS at 10:13

## 2025-03-09 RX ADMIN — HEPARIN SODIUM 7200 UNITS: 1000 INJECTION INTRAVENOUS; SUBCUTANEOUS at 12:32

## 2025-03-09 RX ADMIN — SODIUM CHLORIDE, PRESERVATIVE FREE 10 ML: 5 INJECTION INTRAVENOUS at 21:01

## 2025-03-09 RX ADMIN — PREGABALIN 75 MG: 25 CAPSULE ORAL at 08:50

## 2025-03-09 RX ADMIN — HEPARIN SODIUM 15 UNITS/KG/HR: 10000 INJECTION, SOLUTION INTRAVENOUS at 10:13

## 2025-03-09 RX ADMIN — HYDROMORPHONE HYDROCHLORIDE 0.5 MG: 1 INJECTION, SOLUTION INTRAMUSCULAR; INTRAVENOUS; SUBCUTANEOUS at 20:51

## 2025-03-09 RX ADMIN — SODIUM CHLORIDE, PRESERVATIVE FREE 10 ML: 5 INJECTION INTRAVENOUS at 08:53

## 2025-03-09 RX ADMIN — METOPROLOL TARTRATE 50 MG: 50 TABLET, FILM COATED ORAL at 08:50

## 2025-03-09 ASSESSMENT — PAIN - FUNCTIONAL ASSESSMENT
PAIN_FUNCTIONAL_ASSESSMENT: ACTIVITIES ARE NOT PREVENTED
PAIN_FUNCTIONAL_ASSESSMENT: ACTIVITIES ARE NOT PREVENTED

## 2025-03-09 ASSESSMENT — PAIN DESCRIPTION - ORIENTATION
ORIENTATION: RIGHT

## 2025-03-09 ASSESSMENT — PAIN SCALES - GENERAL
PAINLEVEL_OUTOF10: 7
PAINLEVEL_OUTOF10: 6
PAINLEVEL_OUTOF10: 7
PAINLEVEL_OUTOF10: 4
PAINLEVEL_OUTOF10: 3
PAINLEVEL_OUTOF10: 2
PAINLEVEL_OUTOF10: 0
PAINLEVEL_OUTOF10: 7
PAINLEVEL_OUTOF10: 2
PAINLEVEL_OUTOF10: 6

## 2025-03-09 ASSESSMENT — PAIN DESCRIPTION - LOCATION
LOCATION: LEG
LOCATION: LEG
LOCATION: GROIN
LOCATION: LEG;GROIN

## 2025-03-09 ASSESSMENT — PAIN DESCRIPTION - DESCRIPTORS
DESCRIPTORS: ACHING

## 2025-03-10 ENCOUNTER — APPOINTMENT (OUTPATIENT)
Facility: HOSPITAL | Age: 65
DRG: 253 | End: 2025-03-10
Attending: SURGERY
Payer: MEDICARE

## 2025-03-10 ENCOUNTER — ANESTHESIA (OUTPATIENT)
Dept: CARDIOTHORACIC SURGERY | Facility: HOSPITAL | Age: 65
DRG: 253 | End: 2025-03-10
Payer: MEDICARE

## 2025-03-10 LAB
ANION GAP SERPL CALC-SCNC: 8 MMOL/L (ref 3–18)
APTT PPP: 128.3 SEC (ref 23–36.4)
APTT PPP: >180 SEC (ref 23–36.4)
BASOPHILS # BLD: 0.05 K/UL (ref 0–0.1)
BASOPHILS NFR BLD: 0.5 % (ref 0–2)
BUN SERPL-MCNC: 15 MG/DL (ref 7–18)
BUN/CREAT SERPL: 12 (ref 12–20)
CALCIUM SERPL-MCNC: 9 MG/DL (ref 8.5–10.1)
CHLORIDE SERPL-SCNC: 105 MMOL/L (ref 100–111)
CO2 SERPL-SCNC: 23 MMOL/L (ref 21–32)
CREAT SERPL-MCNC: 1.24 MG/DL (ref 0.6–1.3)
DIFFERENTIAL METHOD BLD: ABNORMAL
EOSINOPHIL # BLD: 0.18 K/UL (ref 0–0.4)
EOSINOPHIL NFR BLD: 1.7 % (ref 0–5)
ERYTHROCYTE [DISTWIDTH] IN BLOOD BY AUTOMATED COUNT: 15.6 % (ref 11.6–14.5)
EST. AVERAGE GLUCOSE BLD GHB EST-MCNC: 111 MG/DL
GLUCOSE BLD STRIP.AUTO-MCNC: 265 MG/DL (ref 70–110)
GLUCOSE BLD STRIP.AUTO-MCNC: 300 MG/DL (ref 70–110)
GLUCOSE BLD STRIP.AUTO-MCNC: 98 MG/DL (ref 70–110)
GLUCOSE BLD STRIP.AUTO-MCNC: 98 MG/DL (ref 70–110)
GLUCOSE SERPL-MCNC: 113 MG/DL (ref 74–99)
HBA1C MFR BLD: 5.5 % (ref 4.2–5.6)
HCT VFR BLD AUTO: 40 % (ref 36–48)
HGB BLD-MCNC: 13.1 G/DL (ref 13–16)
IMM GRANULOCYTES # BLD AUTO: 0.06 K/UL (ref 0–0.04)
IMM GRANULOCYTES NFR BLD AUTO: 0.6 % (ref 0–0.5)
LYMPHOCYTES # BLD: 2.67 K/UL (ref 0.9–3.6)
LYMPHOCYTES NFR BLD: 24.9 % (ref 21–52)
MCH RBC QN AUTO: 29.3 PG (ref 24–34)
MCHC RBC AUTO-ENTMCNC: 32.8 G/DL (ref 31–37)
MCV RBC AUTO: 89.5 FL (ref 78–100)
MONOCYTES # BLD: 1.2 K/UL (ref 0.05–1.2)
MONOCYTES NFR BLD: 11.2 % (ref 3–10)
NEUTS SEG # BLD: 6.57 K/UL (ref 1.8–8)
NEUTS SEG NFR BLD: 61.1 % (ref 40–73)
NRBC # BLD: 0 K/UL (ref 0–0.01)
NRBC BLD-RTO: 0 PER 100 WBC
PLATELET # BLD AUTO: 377 K/UL (ref 135–420)
PMV BLD AUTO: 9.6 FL (ref 9.2–11.8)
POTASSIUM SERPL-SCNC: 3.6 MMOL/L (ref 3.5–5.5)
RBC # BLD AUTO: 4.47 M/UL (ref 4.35–5.65)
SODIUM SERPL-SCNC: 136 MMOL/L (ref 136–145)
WBC # BLD AUTO: 10.7 K/UL (ref 4.6–13.2)

## 2025-03-10 PROCEDURE — 36415 COLL VENOUS BLD VENIPUNCTURE: CPT

## 2025-03-10 PROCEDURE — 2709999900 HC NON-CHARGEABLE SUPPLY: Performed by: SURGERY

## 2025-03-10 PROCEDURE — 7100000001 HC PACU RECOVERY - ADDTL 15 MIN: Performed by: SURGERY

## 2025-03-10 PROCEDURE — 3600000002 HC SURGERY LEVEL 2 BASE: Performed by: SURGERY

## 2025-03-10 PROCEDURE — 2720000010 HC SURG SUPPLY STERILE: Performed by: SURGERY

## 2025-03-10 PROCEDURE — C1874 STENT, COATED/COV W/DEL SYS: HCPCS | Performed by: SURGERY

## 2025-03-10 PROCEDURE — 85730 THROMBOPLASTIN TIME PARTIAL: CPT

## 2025-03-10 PROCEDURE — 82962 GLUCOSE BLOOD TEST: CPT

## 2025-03-10 PROCEDURE — 6360000004 HC RX CONTRAST MEDICATION: Performed by: SURGERY

## 2025-03-10 PROCEDURE — 2140000001 HC CVICU INTERMEDIATE R&B

## 2025-03-10 PROCEDURE — 3700000000 HC ANESTHESIA ATTENDED CARE: Performed by: SURGERY

## 2025-03-10 PROCEDURE — 85025 COMPLETE CBC W/AUTO DIFF WBC: CPT

## 2025-03-10 PROCEDURE — 6370000000 HC RX 637 (ALT 250 FOR IP): Performed by: SURGERY

## 2025-03-10 PROCEDURE — 6370000000 HC RX 637 (ALT 250 FOR IP)

## 2025-03-10 PROCEDURE — 3600000012 HC SURGERY LEVEL 2 ADDTL 15MIN: Performed by: SURGERY

## 2025-03-10 PROCEDURE — C1769 GUIDE WIRE: HCPCS | Performed by: SURGERY

## 2025-03-10 PROCEDURE — 6360000002 HC RX W HCPCS: Performed by: NURSE ANESTHETIST, CERTIFIED REGISTERED

## 2025-03-10 PROCEDURE — 6360000002 HC RX W HCPCS: Performed by: SURGERY

## 2025-03-10 PROCEDURE — 83036 HEMOGLOBIN GLYCOSYLATED A1C: CPT

## 2025-03-10 PROCEDURE — 2580000003 HC RX 258: Performed by: NURSE ANESTHETIST, CERTIFIED REGISTERED

## 2025-03-10 PROCEDURE — 80048 BASIC METABOLIC PNL TOTAL CA: CPT

## 2025-03-10 PROCEDURE — 2500000003 HC RX 250 WO HCPCS: Performed by: SURGERY

## 2025-03-10 PROCEDURE — 3700000001 HC ADD 15 MINUTES (ANESTHESIA): Performed by: SURGERY

## 2025-03-10 PROCEDURE — B41F1ZZ FLUOROSCOPY OF RIGHT LOWER EXTREMITY ARTERIES USING LOW OSMOLAR CONTRAST: ICD-10-PCS | Performed by: SURGERY

## 2025-03-10 PROCEDURE — 94761 N-INVAS EAR/PLS OXIMETRY MLT: CPT

## 2025-03-10 PROCEDURE — 7100000000 HC PACU RECOVERY - FIRST 15 MIN: Performed by: SURGERY

## 2025-03-10 PROCEDURE — 047P34Z DILATION OF RIGHT ANTERIOR TIBIAL ARTERY WITH DRUG-ELUTING INTRALUMINAL DEVICE, PERCUTANEOUS APPROACH: ICD-10-PCS | Performed by: SURGERY

## 2025-03-10 PROCEDURE — 6370000000 HC RX 637 (ALT 250 FOR IP): Performed by: NURSE ANESTHETIST, CERTIFIED REGISTERED

## 2025-03-10 PROCEDURE — C1894 INTRO/SHEATH, NON-LASER: HCPCS | Performed by: SURGERY

## 2025-03-10 PROCEDURE — C1725 CATH, TRANSLUMIN NON-LASER: HCPCS | Performed by: SURGERY

## 2025-03-10 DEVICE — ESPRIT™ BTK EVEROLIMUS ELUTING RESORBABLE SCAFFOLD SYSTEM 3.75MM X 38MM RAPID-EXCHANGE
Type: IMPLANTABLE DEVICE | Site: GROIN | Status: FUNCTIONAL
Brand: ESPRIT™

## 2025-03-10 RX ORDER — DEXTROSE MONOHYDRATE 100 MG/ML
INJECTION, SOLUTION INTRAVENOUS CONTINUOUS PRN
Status: DISCONTINUED | OUTPATIENT
Start: 2025-03-10 | End: 2025-03-10 | Stop reason: HOSPADM

## 2025-03-10 RX ORDER — HEPARIN SODIUM 10000 [USP'U]/100ML
5-30 INJECTION, SOLUTION INTRAVENOUS CONTINUOUS
Status: DISCONTINUED | OUTPATIENT
Start: 2025-03-10 | End: 2025-03-11

## 2025-03-10 RX ORDER — MIDAZOLAM HYDROCHLORIDE 1 MG/ML
INJECTION, SOLUTION INTRAMUSCULAR; INTRAVENOUS
Status: DISCONTINUED | OUTPATIENT
Start: 2025-03-10 | End: 2025-03-10 | Stop reason: SDUPTHER

## 2025-03-10 RX ORDER — FENTANYL CITRATE 50 UG/ML
50 INJECTION, SOLUTION INTRAMUSCULAR; INTRAVENOUS EVERY 5 MIN PRN
Status: DISCONTINUED | OUTPATIENT
Start: 2025-03-10 | End: 2025-03-10

## 2025-03-10 RX ORDER — CEFAZOLIN SODIUM 1 G/3ML
INJECTION, POWDER, FOR SOLUTION INTRAMUSCULAR; INTRAVENOUS
Status: COMPLETED
Start: 2025-03-10 | End: 2025-03-10

## 2025-03-10 RX ORDER — SODIUM CHLORIDE 0.9 % (FLUSH) 0.9 %
5-40 SYRINGE (ML) INJECTION PRN
Status: DISCONTINUED | OUTPATIENT
Start: 2025-03-10 | End: 2025-03-10 | Stop reason: HOSPADM

## 2025-03-10 RX ORDER — FENTANYL CITRATE 50 UG/ML
INJECTION, SOLUTION INTRAMUSCULAR; INTRAVENOUS
Status: DISCONTINUED | OUTPATIENT
Start: 2025-03-10 | End: 2025-03-10 | Stop reason: SDUPTHER

## 2025-03-10 RX ORDER — CEFAZOLIN SODIUM 1 G/3ML
INJECTION, POWDER, FOR SOLUTION INTRAMUSCULAR; INTRAVENOUS
Status: DISCONTINUED | OUTPATIENT
Start: 2025-03-10 | End: 2025-03-10 | Stop reason: SDUPTHER

## 2025-03-10 RX ORDER — DEXAMETHASONE SODIUM PHOSPHATE 4 MG/ML
INJECTION, SOLUTION INTRA-ARTICULAR; INTRALESIONAL; INTRAMUSCULAR; INTRAVENOUS; SOFT TISSUE
Status: DISCONTINUED | OUTPATIENT
Start: 2025-03-10 | End: 2025-03-10 | Stop reason: SDUPTHER

## 2025-03-10 RX ORDER — SODIUM CHLORIDE, SODIUM LACTATE, POTASSIUM CHLORIDE, CALCIUM CHLORIDE 600; 310; 30; 20 MG/100ML; MG/100ML; MG/100ML; MG/100ML
INJECTION, SOLUTION INTRAVENOUS CONTINUOUS
Status: DISCONTINUED | OUTPATIENT
Start: 2025-03-10 | End: 2025-03-10 | Stop reason: HOSPADM

## 2025-03-10 RX ORDER — SODIUM CHLORIDE 0.9 % (FLUSH) 0.9 %
5-40 SYRINGE (ML) INJECTION EVERY 12 HOURS SCHEDULED
Status: DISCONTINUED | OUTPATIENT
Start: 2025-03-10 | End: 2025-03-10 | Stop reason: HOSPADM

## 2025-03-10 RX ORDER — FAMOTIDINE 20 MG/1
20 TABLET, FILM COATED ORAL ONCE
Status: COMPLETED | OUTPATIENT
Start: 2025-03-10 | End: 2025-03-10

## 2025-03-10 RX ORDER — SODIUM CHLORIDE, SODIUM LACTATE, POTASSIUM CHLORIDE, CALCIUM CHLORIDE 600; 310; 30; 20 MG/100ML; MG/100ML; MG/100ML; MG/100ML
INJECTION, SOLUTION INTRAVENOUS CONTINUOUS
Status: DISCONTINUED | OUTPATIENT
Start: 2025-03-10 | End: 2025-03-10

## 2025-03-10 RX ORDER — IODIXANOL 320 MG/ML
INJECTION, SOLUTION INTRAVASCULAR PRN
Status: DISCONTINUED | OUTPATIENT
Start: 2025-03-10 | End: 2025-03-10 | Stop reason: ALTCHOICE

## 2025-03-10 RX ORDER — DEXTROSE MONOHYDRATE 100 MG/ML
INJECTION, SOLUTION INTRAVENOUS CONTINUOUS PRN
Status: DISCONTINUED | OUTPATIENT
Start: 2025-03-10 | End: 2025-03-10

## 2025-03-10 RX ORDER — ONDANSETRON 2 MG/ML
INJECTION INTRAMUSCULAR; INTRAVENOUS
Status: DISCONTINUED | OUTPATIENT
Start: 2025-03-10 | End: 2025-03-10 | Stop reason: SDUPTHER

## 2025-03-10 RX ORDER — LIDOCAINE HYDROCHLORIDE 20 MG/ML
INJECTION, SOLUTION EPIDURAL; INFILTRATION; INTRACAUDAL; PERINEURAL
Status: DISCONTINUED | OUTPATIENT
Start: 2025-03-10 | End: 2025-03-10 | Stop reason: SDUPTHER

## 2025-03-10 RX ORDER — INSULIN LISPRO 100 [IU]/ML
0-4 INJECTION, SOLUTION INTRAVENOUS; SUBCUTANEOUS
Status: DISCONTINUED | OUTPATIENT
Start: 2025-03-10 | End: 2025-03-10 | Stop reason: HOSPADM

## 2025-03-10 RX ORDER — HEPARIN SODIUM 1000 [USP'U]/ML
INJECTION, SOLUTION INTRAVENOUS; SUBCUTANEOUS
Status: DISCONTINUED | OUTPATIENT
Start: 2025-03-10 | End: 2025-03-10 | Stop reason: SDUPTHER

## 2025-03-10 RX ORDER — GLUCAGON 1 MG
1 KIT INJECTION PRN
Status: DISCONTINUED | OUTPATIENT
Start: 2025-03-10 | End: 2025-03-10

## 2025-03-10 RX ORDER — INSULIN LISPRO 100 [IU]/ML
0-4 INJECTION, SOLUTION INTRAVENOUS; SUBCUTANEOUS EVERY 6 HOURS SCHEDULED
Status: DISCONTINUED | OUTPATIENT
Start: 2025-03-10 | End: 2025-03-10 | Stop reason: HOSPADM

## 2025-03-10 RX ORDER — INSULIN LISPRO 100 [IU]/ML
0-15 INJECTION, SOLUTION INTRAVENOUS; SUBCUTANEOUS ONCE
Status: DISCONTINUED | OUTPATIENT
Start: 2025-03-10 | End: 2025-03-10

## 2025-03-10 RX ORDER — HEPARIN SODIUM 200 [USP'U]/100ML
INJECTION, SOLUTION INTRAVENOUS CONTINUOUS PRN
Status: COMPLETED | OUTPATIENT
Start: 2025-03-10 | End: 2025-03-10

## 2025-03-10 RX ORDER — CLOPIDOGREL BISULFATE 75 MG/1
75 TABLET ORAL DAILY
Status: DISCONTINUED | OUTPATIENT
Start: 2025-03-10 | End: 2025-03-11 | Stop reason: HOSPADM

## 2025-03-10 RX ORDER — LIDOCAINE HYDROCHLORIDE 10 MG/ML
INJECTION, SOLUTION EPIDURAL; INFILTRATION; INTRACAUDAL; PERINEURAL PRN
Status: DISCONTINUED | OUTPATIENT
Start: 2025-03-10 | End: 2025-03-10 | Stop reason: ALTCHOICE

## 2025-03-10 RX ORDER — ONDANSETRON 2 MG/ML
4 INJECTION INTRAMUSCULAR; INTRAVENOUS
Status: DISCONTINUED | OUTPATIENT
Start: 2025-03-10 | End: 2025-03-10

## 2025-03-10 RX ORDER — PROPOFOL 10 MG/ML
INJECTION, EMULSION INTRAVENOUS
Status: DISCONTINUED | OUTPATIENT
Start: 2025-03-10 | End: 2025-03-10 | Stop reason: SDUPTHER

## 2025-03-10 RX ORDER — SODIUM CHLORIDE 9 MG/ML
INJECTION, SOLUTION INTRAVENOUS PRN
Status: DISCONTINUED | OUTPATIENT
Start: 2025-03-10 | End: 2025-03-10 | Stop reason: HOSPADM

## 2025-03-10 RX ORDER — SODIUM CHLORIDE, SODIUM LACTATE, POTASSIUM CHLORIDE, CALCIUM CHLORIDE 600; 310; 30; 20 MG/100ML; MG/100ML; MG/100ML; MG/100ML
INJECTION, SOLUTION INTRAVENOUS
Status: DISCONTINUED | OUTPATIENT
Start: 2025-03-10 | End: 2025-03-10 | Stop reason: SDUPTHER

## 2025-03-10 RX ORDER — IODIXANOL 320 MG/ML
INJECTION, SOLUTION INTRAVASCULAR
Status: DISCONTINUED
Start: 2025-03-10 | End: 2025-03-10 | Stop reason: WASHOUT

## 2025-03-10 RX ADMIN — DILTIAZEM HYDROCHLORIDE 120 MG: 120 CAPSULE, COATED, EXTENDED RELEASE ORAL at 08:40

## 2025-03-10 RX ADMIN — LIDOCAINE HYDROCHLORIDE 80 MG: 20 INJECTION, SOLUTION EPIDURAL; INFILTRATION; INTRACAUDAL; PERINEURAL at 15:36

## 2025-03-10 RX ADMIN — HEPARIN SODIUM 15 UNITS/KG/HR: 10000 INJECTION, SOLUTION INTRAVENOUS at 20:09

## 2025-03-10 RX ADMIN — FENTANYL CITRATE 25 MCG: 50 INJECTION INTRAMUSCULAR; INTRAVENOUS at 16:39

## 2025-03-10 RX ADMIN — PREGABALIN 75 MG: 25 CAPSULE ORAL at 08:39

## 2025-03-10 RX ADMIN — ONDANSETRON 4 MG: 2 INJECTION, SOLUTION INTRAMUSCULAR; INTRAVENOUS at 15:50

## 2025-03-10 RX ADMIN — HYDROMORPHONE HYDROCHLORIDE 0.5 MG: 1 INJECTION, SOLUTION INTRAMUSCULAR; INTRAVENOUS; SUBCUTANEOUS at 21:44

## 2025-03-10 RX ADMIN — CEFAZOLIN 2 G: 330 INJECTION, POWDER, FOR SOLUTION INTRAMUSCULAR; INTRAVENOUS at 15:44

## 2025-03-10 RX ADMIN — FENTANYL CITRATE 25 MCG: 50 INJECTION INTRAMUSCULAR; INTRAVENOUS at 15:49

## 2025-03-10 RX ADMIN — SODIUM CHLORIDE, PRESERVATIVE FREE 10 ML: 5 INJECTION INTRAVENOUS at 20:20

## 2025-03-10 RX ADMIN — DEXAMETHASONE SODIUM PHOSPHATE 4 MG: 4 INJECTION INTRA-ARTICULAR; INTRALESIONAL; INTRAMUSCULAR; INTRAVENOUS; SOFT TISSUE at 15:49

## 2025-03-10 RX ADMIN — HEPARIN SODIUM 5000 UNITS: 1000 INJECTION INTRAVENOUS; SUBCUTANEOUS at 16:00

## 2025-03-10 RX ADMIN — FENTANYL CITRATE 25 MCG: 50 INJECTION INTRAMUSCULAR; INTRAVENOUS at 16:22

## 2025-03-10 RX ADMIN — METOPROLOL TARTRATE 50 MG: 50 TABLET, FILM COATED ORAL at 20:04

## 2025-03-10 RX ADMIN — SODIUM CHLORIDE, PRESERVATIVE FREE 10 ML: 5 INJECTION INTRAVENOUS at 08:39

## 2025-03-10 RX ADMIN — CLOPIDOGREL BISULFATE 75 MG: 75 TABLET ORAL at 20:03

## 2025-03-10 RX ADMIN — MIDAZOLAM 2 MG: 1 INJECTION, SOLUTION INTRAMUSCULAR; INTRAVENOUS at 15:31

## 2025-03-10 RX ADMIN — CILOSTAZOL 50 MG: 50 TABLET ORAL at 08:40

## 2025-03-10 RX ADMIN — HEPARIN SODIUM 15 UNITS/KG/HR: 10000 INJECTION, SOLUTION INTRAVENOUS at 05:59

## 2025-03-10 RX ADMIN — FAMOTIDINE 20 MG: 20 TABLET, FILM COATED ORAL at 11:54

## 2025-03-10 RX ADMIN — FUROSEMIDE 20 MG: 20 TABLET ORAL at 08:40

## 2025-03-10 RX ADMIN — PROPOFOL 200 MG: 10 INJECTION, EMULSION INTRAVENOUS at 15:36

## 2025-03-10 RX ADMIN — DILTIAZEM HYDROCHLORIDE 180 MG: 180 CAPSULE, EXTENDED RELEASE ORAL at 18:29

## 2025-03-10 RX ADMIN — ATORVASTATIN CALCIUM 80 MG: 40 TABLET, FILM COATED ORAL at 20:04

## 2025-03-10 RX ADMIN — FENTANYL CITRATE 25 MCG: 50 INJECTION INTRAMUSCULAR; INTRAVENOUS at 16:52

## 2025-03-10 RX ADMIN — FENTANYL CITRATE 25 MCG: 50 INJECTION INTRAMUSCULAR; INTRAVENOUS at 15:47

## 2025-03-10 RX ADMIN — FENTANYL CITRATE 25 MCG: 50 INJECTION INTRAMUSCULAR; INTRAVENOUS at 15:46

## 2025-03-10 RX ADMIN — PREGABALIN 75 MG: 25 CAPSULE ORAL at 20:03

## 2025-03-10 RX ADMIN — SODIUM CHLORIDE, SODIUM LACTATE, POTASSIUM CHLORIDE, AND CALCIUM CHLORIDE: 600; 310; 30; 20 INJECTION, SOLUTION INTRAVENOUS at 15:31

## 2025-03-10 RX ADMIN — CILOSTAZOL 50 MG: 50 TABLET ORAL at 20:03

## 2025-03-10 RX ADMIN — ASPIRIN 81 MG: 81 TABLET, COATED ORAL at 08:40

## 2025-03-10 ASSESSMENT — PAIN - FUNCTIONAL ASSESSMENT: PAIN_FUNCTIONAL_ASSESSMENT: 0-10

## 2025-03-10 ASSESSMENT — PAIN SCALES - GENERAL
PAINLEVEL_OUTOF10: 7
PAINLEVEL_OUTOF10: 0
PAINLEVEL_OUTOF10: 2

## 2025-03-10 ASSESSMENT — PAIN DESCRIPTION - DESCRIPTORS: DESCRIPTORS: ACHING;DULL

## 2025-03-10 ASSESSMENT — COPD QUESTIONNAIRES: CAT_SEVERITY: MILD

## 2025-03-10 ASSESSMENT — PAIN DESCRIPTION - ORIENTATION: ORIENTATION: RIGHT

## 2025-03-10 ASSESSMENT — PAIN DESCRIPTION - LOCATION: LOCATION: LEG;BACK

## 2025-03-10 ASSESSMENT — PAIN SCALES - WONG BAKER: WONGBAKER_NUMERICALRESPONSE: NO HURT

## 2025-03-10 NOTE — ANESTHESIA PRE PROCEDURE
0600 03/10/25 0720 03/10/25 0721 03/10/25 1148   BP:   110/72 126/77   Pulse:   68 79   Resp:   20 20   Temp:   97.9 °F (36.6 °C) 98.4 °F (36.9 °C)   TempSrc:   Oral    SpO2:  97% 97% 98%   Weight: 88.9 kg (195 lb 14.4 oz)      Height:                                                  BP Readings from Last 3 Encounters:   03/10/25 126/77   02/18/25 108/80   02/05/25 120/70       NPO Status: Time of last liquid consumption: 1700                        Time of last solid consumption: 1700                        Date of last liquid consumption: 03/09/25                        Date of last solid food consumption: 03/09/25    BMI:   Wt Readings from Last 3 Encounters:   03/10/25 88.9 kg (195 lb 14.4 oz)   02/18/25 87.1 kg (192 lb)   02/05/25 79.3 kg (174 lb 12.8 oz)     Body mass index is 28.05 kg/m².    CBC:   Lab Results   Component Value Date/Time    WBC 10.7 03/10/2025 03:26 AM    RBC 4.47 03/10/2025 03:26 AM    RBC 4.60 05/23/2023 08:20 AM    HGB 13.1 03/10/2025 03:26 AM    HCT 40.0 03/10/2025 03:26 AM    MCV 89.5 03/10/2025 03:26 AM    RDW 15.6 03/10/2025 03:26 AM     03/10/2025 03:26 AM       CMP:   Lab Results   Component Value Date/Time     03/10/2025 03:26 AM    K 3.6 03/10/2025 03:26 AM     03/10/2025 03:26 AM    CO2 23 03/10/2025 03:26 AM    BUN 15 03/10/2025 03:26 AM    CREATININE 1.24 03/10/2025 03:26 AM    GFRAA >60 03/12/2022 05:50 AM    AGRATIO 1.5 08/20/2024 07:07 AM    LABGLOM 64 03/10/2025 03:26 AM    LABGLOM >60.0 11/16/2023 10:44 AM    GLUCOSE 113 03/10/2025 03:26 AM    GLUCOSE 98 08/20/2024 07:07 AM    CALCIUM 9.0 03/10/2025 03:26 AM    BILITOT 0.9 03/02/2025 03:15 AM    ALKPHOS 161 03/02/2025 03:15 AM    ALKPHOS 175 08/20/2024 07:07 AM    AST 29 03/02/2025 03:15 AM    ALT 33 03/02/2025 03:15 AM       POC Tests:   Recent Labs     03/10/25  1157   POCGLU 265*       Coags:   Lab Results   Component Value Date/Time    PROTIME 16.4 05/05/2024 09:12 AM    INR 1.3 05/05/2024 09:12 AM

## 2025-03-10 NOTE — ANESTHESIA POSTPROCEDURE EVALUATION
Department of Anesthesiology  Postprocedure Note    Patient: Jorden Artis  MRN: [id number]  YOB: 1960  Date of evaluation: 3/10/2025    Procedure Summary       Date: 03/10/25 Room / Location: [facility] 02 / [facility] CARDIAC SURGERY    Anesthesia Start: 1531 Anesthesia Stop: 1722    Procedure: Right leg angiogram with interpretation/ Balloon angioplasty of femoral to tibial bypass conduit./ Balloon angioplasty and stent of right anterior tibial artery/ STANLEY (Right: Groin) Diagnosis:       Atheroscler nonbiologic bypass graft right leg w/intermit claudication      (Atheroscler nonbiologic bypass graft right leg w/intermit claudication [I70.611])    Surgeons: Ayad Sesay MD Responsible Provider: Jermaine Lew MD    Anesthesia Type: General ASA Status: 3            Anesthesia Type: General    Estefania Phase I: Estefania Score: 8    Estefania Phase II:      Anesthesia Post Evaluation    Patient location during evaluation: bedside  Patient participation: complete - patient participated  Level of consciousness: responsive to verbal stimuli  Airway patency: patent  Nausea & Vomiting: no nausea  Respiratory status: acceptable  Hydration status: euvolemic    No notable events documented.

## 2025-03-10 NOTE — PERIOP NOTE
TRANSFER - OUT REPORT:    Verbal report given to Starr on Jorden Artis  being transferred to room 2303 for routine progression of patient care       Report consisted of patient's Situation, Background, Assessment and   Recommendations(SBAR).     Information from the following report(s) Surgery Report, Intake/Output, MAR, and Recent Results was reviewed with the receiving nurse.           Lines:   Peripheral IV 03/01/25 Right Wrist (Active)   Site Assessment Clean, dry & intact 03/10/25 1718   Line Status Infusing 03/10/25 1718   Line Care Cap changed 03/10/25 1017   Phlebitis Assessment No symptoms 03/10/25 1718   Infiltration Assessment 0 03/10/25 1718   Alcohol Cap Used Yes 03/10/25 1017   Dressing Status Clean, dry & intact 03/10/25 1718   Dressing Type Transparent 03/10/25 1718   Dressing Intervention New 03/01/25 1559       Peripheral IV 03/06/25 Left;Anterior Forearm (Active)   Site Assessment Clean, dry & intact 03/10/25 1718   Line Status Infusing 03/10/25 1718   Line Care Connections checked and tightened 03/10/25 1017   Phlebitis Assessment No symptoms 03/10/25 1718   Infiltration Assessment 0 03/10/25 1718   Alcohol Cap Used Yes 03/10/25 1017   Dressing Status Clean, dry & intact 03/10/25 1718   Dressing Type Transparent 03/10/25 1718        Opportunity for questions and clarification was provided.      Patient transported with:  Tech

## 2025-03-10 NOTE — OP NOTE
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  Balloon angioplasty of the bypass graft and anterior tibial artery with associated angiography and interpretation.      Surgeon(s):  Ayad Sesay MD    Assistant:   Surgical Assistant: Yelena Rodrigues    Anesthesia: General    Estimated Blood Loss (mL): Minimal    Complications: None    Specimens:   * No specimens in log *    Implants:  * No implants in log *      Drains:   Urinary Catheter 02/27/25 2 Way (Active)       [REMOVED] Urinary Catheter 05/06/24 2 Way (Removed)       Findings:  Infection Present At Time Of Surgery (PATOS) (choose all levels that have infection present):  No infection present  Other Findings: Axillofemoral bypass graft is patent.  The common femoral and profunda femoris arteries are patent.  There is an acute occlusion of the cadaver vein femoral to tibial bypass.  Post thrombectomy was revealed a critical stenosis at the femoral artery to vein conduit.  There was a critical stenosis at the vein conduit tibial artery with moderate severity atherosclerosis.     Detailed Description of Procedure:   Patient was brought to the operating room where he had general anesthesia and lines per anesthesia.  He had a couple days of the bypass being down with some early ischemic symptoms.  His right leg was prepped draped in usual standard fashion.  Can palpate the graft as a cadaver vein conduit from the femoral artery to the anterior 
Catheter (Removed)   Site Assessment Clean,dry & intact 03/01/25 0742   Placement Replaced 03/01/25 0742   Securement Method Securing device (Describe) 03/01/25 0742   Catheter Care Catheter/Wick replaced 03/01/25 0742   Perineal Care Yes 03/01/25 0742   Suction 40 mmgHg continuous 03/01/25 0742   Urine Color Yellow 03/01/25 0742   Urine Appearance Clear 03/01/25 0742   Urine Odor Other (Comment) 02/28/25 2000   Output (mL) 125 mL 03/01/25 1812       Findings:  Infection Present At Time Of Surgery (PATOS) (choose all levels that have infection present):  No infection present  Other Findings: Recalcitrant or recurrent stenosis of the distal bypass graft in the proximal anterior tibial artery.  All thrombus has resolved.    Detailed Description of Procedure:   Patient brought the operating placed in spine position and general anesthesia prepped the right leg draped in usual standard fashion.  I went through the same incision on the thigh open this up after localizing.  There is a small amount of hematoma that I evacuated isolated the vein and placed a vessel loop..  There was a pulse in the conduit I punctured it with an 8 18-gauge needle and placed a wire and a 5 [place] sheath.  Gave 5000 of heparin.  After the appropriate waiting.  Brought a catheter down distally and took right leg angiogram demonstrated the bypass graft is patent there is no residual thrombus.  The distal 2 to 3 cm have recurrent 80% stenosis.  The proximal anterior tibial artery for about 3 cm has 80 to 90% stenosis here all atherosclerosis no thrombus.  Process with a 014 wire and performed balloon angioplasty to pretreat the vessels with a 4 x 100 balloon.  Then deployed a 3.75 x 30 8 absorbable Esprit stent followed by post dilating it with the 4 balloon to 4.24.  I balloon angioplastied the distal graft with a 5 x 40 balloon.  Final picture shows nicely patent graft excellent flow and a fully patent anterior tibial artery with no residual 
External Urinary Catheter (Removed)   Site Assessment Clean,dry & intact 03/01/25 0742   Placement Replaced 03/01/25 0742   Securement Method Securing device (Describe) 03/01/25 0742   Catheter Care Catheter/Wick replaced 03/01/25 0742   Perineal Care Yes 03/01/25 0742   Suction 40 mmgHg continuous 03/01/25 0742   Urine Color Yellow 03/01/25 0742   Urine Appearance Clear 03/01/25 0742   Urine Odor Other (Comment) 02/28/25 2000   Output (mL) 125 mL 03/01/25 1812       Findings:  Infection Present At Time Of Surgery (PATOS) (choose all levels that have infection present):  No infection present  Other Findings: Axillofemoral graft is nicely patent.  Critical recurrent stenosis at origin of the femoral bypass conduit.  Critical outflow stenosis recurrent of the anterior tibial artery.    Detailed Description of Procedure:   Patient brought to the operating room placed on table spine position general anesthesia Toro catheter was placed appropriate lines were administered.  Prepped the right leg draped in usual standard fashion.  Using ultrasound to evaluate the femoral conduit it was occluded occlusion and thrombus was seen.  I made a cutdown on the upper thigh and exposing the cadaver vein fem-tib conduit.  It was well incorporated noninfected.  I placed 2 vessels and made a graftotomy gave 5000 of heparin.  Thrombectomized the graft not a lot of thrombus in the graft itself.  Did not achieve any reliable inflow so I placed a 6 [place] sheath performed angiogram the axillofemoral graft was nicely patent running off to the common femoral and profunda.  There was a critical inflow segment stenosis about 3 cm in length.  I ballooned this first before then a 5 mm balloon there is still recurrent recall here so I stented with a 6 x 40 Allyssa stent.  Stent was in good position and now I have a fully robust and patent inflow.  I flushed the site and directed the angiogram distally bypass graft is clean and free of thrombus.

## 2025-03-11 VITALS
BODY MASS INDEX: 27.96 KG/M2 | RESPIRATION RATE: 18 BRPM | OXYGEN SATURATION: 97 % | DIASTOLIC BLOOD PRESSURE: 73 MMHG | TEMPERATURE: 98.1 F | WEIGHT: 195.3 LBS | HEART RATE: 80 BPM | SYSTOLIC BLOOD PRESSURE: 128 MMHG | HEIGHT: 70 IN

## 2025-03-11 LAB
APTT PPP: 79.8 SEC (ref 23–36.4)
ERYTHROCYTE [DISTWIDTH] IN BLOOD BY AUTOMATED COUNT: 15 % (ref 11.6–14.5)
EST. AVERAGE GLUCOSE BLD GHB EST-MCNC: 111 MG/DL
HBA1C MFR BLD: 5.5 % (ref 4.2–5.6)
HCT VFR BLD AUTO: 40.1 % (ref 36–48)
HGB BLD-MCNC: 13.2 G/DL (ref 13–16)
MCH RBC QN AUTO: 29.5 PG (ref 24–34)
MCHC RBC AUTO-ENTMCNC: 32.9 G/DL (ref 31–37)
MCV RBC AUTO: 89.7 FL (ref 78–100)
NRBC # BLD: 0 K/UL (ref 0–0.01)
NRBC BLD-RTO: 0 PER 100 WBC
PLATELET # BLD AUTO: 413 K/UL (ref 135–420)
PMV BLD AUTO: 9.8 FL (ref 9.2–11.8)
RBC # BLD AUTO: 4.47 M/UL (ref 4.35–5.65)
WBC # BLD AUTO: 9.6 K/UL (ref 4.6–13.2)

## 2025-03-11 PROCEDURE — 6370000000 HC RX 637 (ALT 250 FOR IP)

## 2025-03-11 PROCEDURE — 85730 THROMBOPLASTIN TIME PARTIAL: CPT

## 2025-03-11 PROCEDURE — 83036 HEMOGLOBIN GLYCOSYLATED A1C: CPT

## 2025-03-11 PROCEDURE — 2500000003 HC RX 250 WO HCPCS: Performed by: SURGERY

## 2025-03-11 PROCEDURE — 36415 COLL VENOUS BLD VENIPUNCTURE: CPT

## 2025-03-11 PROCEDURE — 94761 N-INVAS EAR/PLS OXIMETRY MLT: CPT

## 2025-03-11 PROCEDURE — 6370000000 HC RX 637 (ALT 250 FOR IP): Performed by: SURGERY

## 2025-03-11 PROCEDURE — 6360000002 HC RX W HCPCS: Performed by: SURGERY

## 2025-03-11 PROCEDURE — 85027 COMPLETE CBC AUTOMATED: CPT

## 2025-03-11 RX ORDER — CLOPIDOGREL BISULFATE 75 MG/1
75 TABLET ORAL DAILY
Qty: 30 TABLET | Refills: 3 | Status: SHIPPED | OUTPATIENT
Start: 2025-03-12

## 2025-03-11 RX ADMIN — SODIUM CHLORIDE, PRESERVATIVE FREE 10 ML: 5 INJECTION INTRAVENOUS at 08:17

## 2025-03-11 RX ADMIN — HEPARIN SODIUM 15 UNITS/KG/HR: 10000 INJECTION, SOLUTION INTRAVENOUS at 07:12

## 2025-03-11 RX ADMIN — HYDROMORPHONE HYDROCHLORIDE 0.5 MG: 1 INJECTION, SOLUTION INTRAMUSCULAR; INTRAVENOUS; SUBCUTANEOUS at 09:22

## 2025-03-11 RX ADMIN — DILTIAZEM HYDROCHLORIDE 120 MG: 120 CAPSULE, COATED, EXTENDED RELEASE ORAL at 08:16

## 2025-03-11 RX ADMIN — ASPIRIN 81 MG: 81 TABLET, COATED ORAL at 08:16

## 2025-03-11 RX ADMIN — HYDROMORPHONE HYDROCHLORIDE 0.5 MG: 1 INJECTION, SOLUTION INTRAMUSCULAR; INTRAVENOUS; SUBCUTANEOUS at 13:10

## 2025-03-11 RX ADMIN — CILOSTAZOL 50 MG: 50 TABLET ORAL at 08:16

## 2025-03-11 RX ADMIN — FUROSEMIDE 20 MG: 20 TABLET ORAL at 08:16

## 2025-03-11 RX ADMIN — CLOPIDOGREL BISULFATE 75 MG: 75 TABLET ORAL at 08:16

## 2025-03-11 RX ADMIN — METOPROLOL TARTRATE 50 MG: 50 TABLET, FILM COATED ORAL at 08:16

## 2025-03-11 RX ADMIN — PREGABALIN 75 MG: 25 CAPSULE ORAL at 08:16

## 2025-03-11 RX ADMIN — HYDROMORPHONE HYDROCHLORIDE 0.5 MG: 1 INJECTION, SOLUTION INTRAMUSCULAR; INTRAVENOUS; SUBCUTANEOUS at 04:04

## 2025-03-11 ASSESSMENT — PAIN DESCRIPTION - LOCATION
LOCATION: LEG

## 2025-03-11 ASSESSMENT — PAIN SCALES - GENERAL
PAINLEVEL_OUTOF10: 0
PAINLEVEL_OUTOF10: 8
PAINLEVEL_OUTOF10: 7
PAINLEVEL_OUTOF10: 0
PAINLEVEL_OUTOF10: 8

## 2025-03-11 ASSESSMENT — PAIN DESCRIPTION - DESCRIPTORS
DESCRIPTORS: ACHING

## 2025-03-11 ASSESSMENT — PAIN SCALES - WONG BAKER: WONGBAKER_NUMERICALRESPONSE: NO HURT

## 2025-03-11 ASSESSMENT — PAIN DESCRIPTION - ORIENTATION
ORIENTATION: RIGHT

## 2025-03-11 NOTE — PLAN OF CARE
Problem: Chronic Conditions and Co-morbidities  Goal: Patient's chronic conditions and co-morbidity symptoms are monitored and maintained or improved  Outcome: Progressing  Flowsheets  Taken 2/28/2025 1936 by Raman Pagan RN  Care Plan - Patient's Chronic Conditions and Co-Morbidity Symptoms are Monitored and Maintained or Improved: Monitor and assess patient's chronic conditions and comorbid symptoms for stability, deterioration, or improvement  Problem: Discharge Planning  Goal: Discharge to home or other facility with appropriate resources  Outcome: Progressing  Flowsheets  Taken 2/28/2025 1936 by Raman Pagan RN  Discharge to home or other facility with appropriate resources: Identify barriers to discharge with patient and caregiver  Problem: Cardiovascular - Adult  Goal: Maintains optimal cardiac output and hemodynamic stability  Outcome: Progressing  Flowsheets  Taken 2/28/2025 1936 by Raman Pagan RN  Maintains optimal cardiac output and hemodynamic stability: Monitor blood pressure and heart rate          
  Problem: Safety - Adult  Goal: Free from fall injury  2/28/2025 0206 by Yun Hollis, RN  Outcome: Progressing  Flowsheets (Taken 2/28/2025 0206)  Free From Fall Injury: Instruct family/caregiver on patient safety  Note: Patient to remain free of any falls or injuries during length of stay. Safety interventions implemented: non-skid sock placed on R foot, bed alarm on, and call light within reach. Education given on IV lines.    Problem: Skin/Tissue Integrity  Goal: Skin integrity remains intact  Description: 1.  Monitor for areas of redness and/or skin breakdown  2.  Assess vascular access sites hourly  3.  Every 4-6 hours minimum:  Change oxygen saturation probe site  4.  Every 4-6 hours:  If on nasal continuous positive airway pressure, respiratory therapy assess nares and determine need for appliance change or resting period  2/28/2025 0206 by Yun Hollis, RN  Outcome: Progressing  Flowsheets  Taken 2/28/2025 0206  Skin Integrity Remains Intact: Monitor for areas of redness and/or skin breakdown  Taken 2/27/2025 2033  Skin Integrity Remains Intact:   Monitor for areas of redness and/or skin breakdown   Assess vascular access sites hourly  Note: Patient to remain free of any new skin breakdown during length of stay. Patient has a surgical incision to the R lateral thigh with skin glue present, open to air. Patient also has a L AKA with redness present. Q2 turning implemented. Skin to be assessed every shift for changes.    Problem: Pain  Goal: Verbalizes/displays adequate comfort level or baseline comfort level  2/28/2025 0206 by Yun Hollis, RN  Outcome: Progressing  Flowsheets  Taken 2/28/2025 0206  Verbalizes/displays adequate comfort level or baseline comfort level: Assess pain using appropriate pain scale  Taken 2/27/2025 2033  Verbalizes/displays adequate comfort level or baseline comfort level:   Encourage patient to monitor pain and request assistance   Assess pain using appropriate pain scale  Note: 
  Problem: Safety - Adult  Goal: Free from fall injury  3/10/2025 1034 by Sheri Marcos RN  Outcome: Progressing  Flowsheets (Taken 3/9/2025 2140 by Juan Carlos Avila RN)  Free From Fall Injury: Instruct family/caregiver on patient safety     Problem: Skin/Tissue Integrity  Goal: Skin integrity remains intact  Description: 1.  Monitor for areas of redness and/or skin breakdown  2.  Assess vascular access sites hourly  3.  Every 4-6 hours minimum:  Change oxygen saturation probe site  4.  Every 4-6 hours:  If on nasal continuous positive airway pressure, respiratory therapy assess nares and determine need for appliance change or resting period  3/10/2025 1034 by Sheri Marcos RN  Outcome: Progressing  Flowsheets (Taken 3/10/2025 1034)  Skin Integrity Remains Intact: Monitor for areas of redness and/or skin breakdown     Problem: Pain  Goal: Verbalizes/displays adequate comfort level or baseline comfort level  3/10/2025 1034 by Sheri Marcos RN  Outcome: Progressing  Flowsheets (Taken 3/10/2025 1034)  Verbalizes/displays adequate comfort level or baseline comfort level: Encourage patient to monitor pain and request assistance     Problem: Cardiovascular - Adult  Goal: Maintains optimal cardiac output and hemodynamic stability  3/10/2025 1034 by Sheri Marcos RN  Flowsheets (Taken 3/10/2025 1034)  Maintains optimal cardiac output and hemodynamic stability:   Monitor blood pressure and heart rate   Monitor urine output and notify Licensed Independent Practitioner for values outside of normal range     Problem: Skin/Tissue Integrity - Adult  Goal: Skin integrity remains intact  Description: 1.  Monitor for areas of redness and/or skin breakdown  2.  Assess vascular access sites hourly  3.  Every 4-6 hours minimum:  Change oxygen saturation probe site  4.  Every 4-6 hours:  If on nasal continuous positive airway pressure, respiratory therapy assess nares and determine need for appliance change or resting 
  Problem: Safety - Adult  Goal: Free from fall injury  3/10/2025 2236 by Juan Carlos Avila RN  Outcome: Progressing  Flowsheets (Taken 3/10/2025 2236)  Free From Fall Injury: Instruct family/caregiver on patient safety  3/10/2025 1034 by Sheri Marcos RN  Outcome: Progressing  Flowsheets (Taken 3/9/2025 2140 by Juan Carlos Avila RN)  Free From Fall Injury: Instruct family/caregiver on patient safety     Problem: Skin/Tissue Integrity  Goal: Skin integrity remains intact  Description: 1.  Monitor for areas of redness and/or skin breakdown  2.  Assess vascular access sites hourly  3.  Every 4-6 hours minimum:  Change oxygen saturation probe site  4.  Every 4-6 hours:  If on nasal continuous positive airway pressure, respiratory therapy assess nares and determine need for appliance change or resting period  3/10/2025 2236 by Juan Carlos Avila RN  Outcome: Progressing  Flowsheets  Taken 3/10/2025 2236  Skin Integrity Remains Intact:   Monitor for areas of redness and/or skin breakdown   Assess vascular access sites hourly   Every 4-6 hours minimum: Change oxygen saturation probe site  Taken 3/10/2025 1910  Skin Integrity Remains Intact:   Assess vascular access sites hourly   Monitor for areas of redness and/or skin breakdown   Every 4-6 hours minimum: Change oxygen saturation probe site  3/10/2025 1034 by Sheri Marcos RN  Outcome: Progressing  Flowsheets (Taken 3/10/2025 1034)  Skin Integrity Remains Intact: Monitor for areas of redness and/or skin breakdown     Problem: Pain  Goal: Verbalizes/displays adequate comfort level or baseline comfort level  3/10/2025 2236 by Juan Carlos Avila RN  Outcome: Progressing  Flowsheets (Taken 3/10/2025 2236)  Verbalizes/displays adequate comfort level or baseline comfort level:   Encourage patient to monitor pain and request assistance   Assess pain using appropriate pain scale  3/10/2025 1034 by Sheri Marcos RN  Outcome: Progressing  Flowsheets (Taken 3/10/2025 
  Problem: Safety - Adult  Goal: Free from fall injury  3/3/2025 0105 by Maggy Judd RN  Outcome: Progressing  3/2/2025 1142 by Liana Simpson GN  Outcome: Progressing     Problem: Skin/Tissue Integrity  Goal: Skin integrity remains intact  Description: 1.  Monitor for areas of redness and/or skin breakdown  2.  Assess vascular access sites hourly  3.  Every 4-6 hours minimum:  Change oxygen saturation probe site  4.  Every 4-6 hours:  If on nasal continuous positive airway pressure, respiratory therapy assess nares and determine need for appliance change or resting period  3/3/2025 0105 by Maggy Judd RN  Outcome: Progressing  Flowsheets (Taken 3/2/2025 1945)  Skin Integrity Remains Intact:   Monitor for areas of redness and/or skin breakdown   Assess vascular access sites hourly  3/2/2025 1142 by Liana Simpson GN  Outcome: Progressing  Flowsheets (Taken 3/2/2025 0800)  Skin Integrity Remains Intact: Monitor for areas of redness and/or skin breakdown     Problem: Pain  Goal: Verbalizes/displays adequate comfort level or baseline comfort level  3/2/2025 1142 by Liana Simpson GN  Outcome: Progressing     Problem: Chronic Conditions and Co-morbidities  Goal: Patient's chronic conditions and co-morbidity symptoms are monitored and maintained or improved  3/3/2025 0105 by Maggy Judd RN  Outcome: Progressing  Flowsheets (Taken 3/2/2025 1945)  Care Plan - Patient's Chronic Conditions and Co-Morbidity Symptoms are Monitored and Maintained or Improved:   Monitor and assess patient's chronic conditions and comorbid symptoms for stability, deterioration, or improvement   Update acute care plan with appropriate goals if chronic or comorbid symptoms are exacerbated and prevent overall improvement and discharge   Collaborate with multidisciplinary team to address chronic and comorbid conditions and prevent exacerbation or deterioration  3/2/2025 1142 by Liana Simpson GN  Outcome: Progressing  Flowsheets 
  Problem: Safety - Adult  Goal: Free from fall injury  3/4/2025 1239 by Jaret Smart RN  Outcome: Progressing  Flowsheets (Taken 3/4/2025 1239)  Free From Fall Injury:   Instruct family/caregiver on patient safety   Based on caregiver fall risk screen, instruct family/caregiver to ask for assistance with transferring infant if caregiver noted to have fall risk factors     Problem: Skin/Tissue Integrity  Goal: Skin integrity remains intact  Description: 1.  Monitor for areas of redness and/or skin breakdown  2.  Assess vascular access sites hourly  3.  Every 4-6 hours minimum:  Change oxygen saturation probe site  4.  Every 4-6 hours:  If on nasal continuous positive airway pressure, respiratory therapy assess nares and determine need for appliance change or resting period  3/4/2025 1239 by Jaret Smart RN  Outcome: Progressing  Flowsheets (Taken 3/4/2025 1239)  Skin Integrity Remains Intact:   Assess vascular access sites hourly   Monitor for areas of redness and/or skin breakdown     Problem: Pain  Goal: Verbalizes/displays adequate comfort level or baseline comfort level  3/4/2025 1239 by Jaret Smart RN  Outcome: Progressing  Flowsheets (Taken 3/4/2025 1239)  Verbalizes/displays adequate comfort level or baseline comfort level:   Encourage patient to monitor pain and request assistance   Assess pain using appropriate pain scale   Administer analgesics based on type and severity of pain and evaluate response     Problem: Chronic Conditions and Co-morbidities  Goal: Patient's chronic conditions and co-morbidity symptoms are monitored and maintained or improved  3/4/2025 1239 by Jaret Smart RN  Outcome: Progressing  Flowsheets (Taken 3/4/2025 1239)  Care Plan - Patient's Chronic Conditions and Co-Morbidity Symptoms are Monitored and Maintained or Improved:   Monitor and assess patient's chronic conditions and comorbid symptoms for stability, deterioration, or improvement   Collaborate with 
  Problem: Safety - Adult  Goal: Free from fall injury  3/5/2025 0027 by Oumar Verde RN  Outcome: Progressing  Flowsheets (Taken 3/5/2025 0027)  Free From Fall Injury:   Instruct family/caregiver on patient safety   Based on caregiver fall risk screen, instruct family/caregiver to ask for assistance with transferring infant if caregiver noted to have fall risk factors  3/4/2025 1239 by Jaret Smart RN  Outcome: Progressing  Flowsheets (Taken 3/4/2025 1239)  Free From Fall Injury:   Instruct family/caregiver on patient safety   Based on caregiver fall risk screen, instruct family/caregiver to ask for assistance with transferring infant if caregiver noted to have fall risk factors     Problem: Skin/Tissue Integrity  Goal: Skin integrity remains intact  Description: 1.  Monitor for areas of redness and/or skin breakdown  2.  Assess vascular access sites hourly  3.  Every 4-6 hours minimum:  Change oxygen saturation probe site  4.  Every 4-6 hours:  If on nasal continuous positive airway pressure, respiratory therapy assess nares and determine need for appliance change or resting period  3/5/2025 0027 by Oumar Verde RN  Outcome: Progressing  Flowsheets  Taken 3/5/2025 0027  Skin Integrity Remains Intact:   Monitor for areas of redness and/or skin breakdown   Assess vascular access sites hourly   Every 4-6 hours minimum: Change oxygen saturation probe site   Every 4-6 hours: If on nasal continuous positive airway pressure, respiratory therapy assesses nares and determine need for appliance change or resting period  Taken 3/4/2025 1917  Skin Integrity Remains Intact:   Monitor for areas of redness and/or skin breakdown   Assess vascular access sites hourly   Every 4-6 hours minimum: Change oxygen saturation probe site   Every 4-6 hours: If on nasal continuous positive airway pressure, respiratory therapy assesses nares and determine need for appliance change or resting period  3/4/2025 1239 by Berry 
  Problem: Safety - Adult  Goal: Free from fall injury  3/5/2025 1256 by Jaret Smart RN  Outcome: Progressing  Flowsheets (Taken 3/5/2025 1256)  Free From Fall Injury:   Instruct family/caregiver on patient safety   Based on caregiver fall risk screen, instruct family/caregiver to ask for assistance with transferring infant if caregiver noted to have fall risk factors     Problem: Pain  Goal: Verbalizes/displays adequate comfort level or baseline comfort level  3/5/2025 1256 by Jaret Smart RN  Outcome: Progressing  Flowsheets (Taken 3/5/2025 1256)  Verbalizes/displays adequate comfort level or baseline comfort level:   Encourage patient to monitor pain and request assistance   Assess pain using appropriate pain scale     Problem: Musculoskeletal - Adult  Goal: Return mobility to safest level of function  3/5/2025 1256 by Jaret Smart RN  Outcome: Progressing  Flowsheets (Taken 3/5/2025 1256)  Return Mobility to Safest Level of Function:   Assess patient stability and activity tolerance for standing, transferring and ambulating with or without assistive devices   Assist with transfers and ambulation using safe patient handling equipment as needed   Ensure adequate protection for wounds/incisions during mobilization     
  Problem: Safety - Adult  Goal: Free from fall injury  3/5/2025 2257 by Oumar Verde RN  Outcome: Progressing  Flowsheets (Taken 3/5/2025 2257)  Free From Fall Injury:   Instruct family/caregiver on patient safety   Based on caregiver fall risk screen, instruct family/caregiver to ask for assistance with transferring infant if caregiver noted to have fall risk factors  3/5/2025 1256 by Jaret Smart RN  Outcome: Progressing  Flowsheets (Taken 3/5/2025 1256)  Free From Fall Injury:   Instruct family/caregiver on patient safety   Based on caregiver fall risk screen, instruct family/caregiver to ask for assistance with transferring infant if caregiver noted to have fall risk factors     Problem: Skin/Tissue Integrity  Goal: Skin integrity remains intact  Description: 1.  Monitor for areas of redness and/or skin breakdown  2.  Assess vascular access sites hourly  3.  Every 4-6 hours minimum:  Change oxygen saturation probe site  4.  Every 4-6 hours:  If on nasal continuous positive airway pressure, respiratory therapy assess nares and determine need for appliance change or resting period  Outcome: Progressing  Flowsheets (Taken 3/5/2025 2033)  Skin Integrity Remains Intact:   Monitor for areas of redness and/or skin breakdown   Every 4-6 hours minimum: Change oxygen saturation probe site   Assess vascular access sites hourly   Every 4-6 hours: If on nasal continuous positive airway pressure, respiratory therapy assesses nares and determine need for appliance change or resting period     Problem: Pain  Goal: Verbalizes/displays adequate comfort level or baseline comfort level  3/5/2025 2257 by Oumar Verde RN  Outcome: Progressing  Flowsheets (Taken 3/5/2025 2257)  Verbalizes/displays adequate comfort level or baseline comfort level:   Encourage patient to monitor pain and request assistance   Implement non-pharmacological measures as appropriate and evaluate response   Assess pain using appropriate 
  Problem: Safety - Adult  Goal: Free from fall injury  3/6/2025 2139 by Oumar Verde RN  Outcome: Progressing  Flowsheets (Taken 3/6/2025 2139)  Free From Fall Injury:   Instruct family/caregiver on patient safety   Based on caregiver fall risk screen, instruct family/caregiver to ask for assistance with transferring infant if caregiver noted to have fall risk factors  3/6/2025 1459 by Jaret Smart RN  Outcome: Progressing  Flowsheets (Taken 3/6/2025 1459)  Free From Fall Injury:   Instruct family/caregiver on patient safety   Based on caregiver fall risk screen, instruct family/caregiver to ask for assistance with transferring infant if caregiver noted to have fall risk factors     Problem: Skin/Tissue Integrity  Goal: Skin integrity remains intact  Description: 1.  Monitor for areas of redness and/or skin breakdown  2.  Assess vascular access sites hourly  3.  Every 4-6 hours minimum:  Change oxygen saturation probe site  4.  Every 4-6 hours:  If on nasal continuous positive airway pressure, respiratory therapy assess nares and determine need for appliance change or resting period  3/6/2025 2139 by Oumar Verde RN  Outcome: Progressing  Flowsheets  Taken 3/6/2025 2139  Skin Integrity Remains Intact:   Monitor for areas of redness and/or skin breakdown   Assess vascular access sites hourly   Every 4-6 hours minimum: Change oxygen saturation probe site   Every 4-6 hours: If on nasal continuous positive airway pressure, respiratory therapy assesses nares and determine need for appliance change or resting period  Taken 3/6/2025 1959  Skin Integrity Remains Intact:   Monitor for areas of redness and/or skin breakdown   Assess vascular access sites hourly   Every 4-6 hours minimum: Change oxygen saturation probe site   Every 4-6 hours: If on nasal continuous positive airway pressure, respiratory therapy assesses nares and determine need for appliance change or resting period  3/6/2025 1459 by Berry 
  Problem: Safety - Adult  Goal: Free from fall injury  3/7/2025 2139 by Eleno Allen RN  Outcome: Progressing  Flowsheets (Taken 3/7/2025 2139)  Free From Fall Injury: Instruct family/caregiver on patient safety  Note: Pt. Educated on call bell when in need of help and assistance.  Pt. Verbalized understanding.     3/7/2025 1644 by Priscila Marks RN  Outcome: Progressing  Flowsheets (Taken 3/6/2025 2139 by Oumar eVrde RN)  Free From Fall Injury:   Instruct family/caregiver on patient safety   Based on caregiver fall risk screen, instruct family/caregiver to ask for assistance with transferring infant if caregiver noted to have fall risk factors     Problem: Skin/Tissue Integrity  Goal: Skin integrity remains intact  Description: 1.  Monitor for areas of redness and/or skin breakdown  2.  Assess vascular access sites hourly  3.  Every 4-6 hours minimum:  Change oxygen saturation probe site  4.  Every 4-6 hours:  If on nasal continuous positive airway pressure, respiratory therapy assess nares and determine need for appliance change or resting period  3/7/2025 2139 by Eleno Allen RN  Outcome: Progressing  Flowsheets (Taken 3/7/2025 2139)  Skin Integrity Remains Intact: Monitor for areas of redness and/or skin breakdown  3/7/2025 1644 by Priscila Marks RN  Outcome: Progressing  Flowsheets (Taken 3/6/2025 2139 by Oumar Verde RN)  Skin Integrity Remains Intact:   Monitor for areas of redness and/or skin breakdown   Assess vascular access sites hourly   Every 4-6 hours minimum: Change oxygen saturation probe site   Every 4-6 hours: If on nasal continuous positive airway pressure, respiratory therapy assesses nares and determine need for appliance change or resting period     Problem: Pain  Goal: Verbalizes/displays adequate comfort level or baseline comfort level  3/7/2025 2139 by Eleno Allen RN  Outcome: Progressing  Flowsheets (Taken 3/7/2025 
  Problem: Safety - Adult  Goal: Free from fall injury  3/8/2025 2210 by Juan Carlos Avila RN  Outcome: Progressing  Flowsheets (Taken 3/8/2025 2210)  Free From Fall Injury: Instruct family/caregiver on patient safety  3/8/2025 1228 by Jaret Smart RN  Outcome: Progressing  Flowsheets (Taken 3/8/2025 1228)  Free From Fall Injury:   Instruct family/caregiver on patient safety   Based on caregiver fall risk screen, instruct family/caregiver to ask for assistance with transferring infant if caregiver noted to have fall risk factors     Problem: Skin/Tissue Integrity  Goal: Skin integrity remains intact  Description: 1.  Monitor for areas of redness and/or skin breakdown  2.  Assess vascular access sites hourly  3.  Every 4-6 hours minimum:  Change oxygen saturation probe site  4.  Every 4-6 hours:  If on nasal continuous positive airway pressure, respiratory therapy assess nares and determine need for appliance change or resting period  3/8/2025 2210 by Juan Carlos Avila RN  Outcome: Progressing  Flowsheets (Taken 3/8/2025 2210)  Skin Integrity Remains Intact:   Monitor for areas of redness and/or skin breakdown   Assess vascular access sites hourly   Every 4-6 hours minimum: Change oxygen saturation probe site  3/8/2025 1228 by Jaret Smart RN  Outcome: Progressing  Flowsheets (Taken 3/8/2025 1228)  Skin Integrity Remains Intact:   Monitor for areas of redness and/or skin breakdown   Assess vascular access sites hourly     Problem: Pain  Goal: Verbalizes/displays adequate comfort level or baseline comfort level  3/8/2025 2210 by Juan Carlos Avila RN  Outcome: Progressing  Flowsheets (Taken 3/8/2025 1228 by Jaret Smart RN)  Verbalizes/displays adequate comfort level or baseline comfort level:   Encourage patient to monitor pain and request assistance   Assess pain using appropriate pain scale   Administer analgesics based on type and severity of pain and evaluate response  3/8/2025 1228 by Jaret Smart 
  Problem: Safety - Adult  Goal: Free from fall injury  3/9/2025 2140 by Juan Carlos Avila RN  Outcome: Progressing  Flowsheets (Taken 3/9/2025 2140)  Free From Fall Injury: Instruct family/caregiver on patient safety  3/9/2025 0900 by Marcela Mills RN  Outcome: Progressing     Problem: Skin/Tissue Integrity  Goal: Skin integrity remains intact  Description: 1.  Monitor for areas of redness and/or skin breakdown  2.  Assess vascular access sites hourly  3.  Every 4-6 hours minimum:  Change oxygen saturation probe site  4.  Every 4-6 hours:  If on nasal continuous positive airway pressure, respiratory therapy assess nares and determine need for appliance change or resting period  3/9/2025 2140 by Juan Carlos Avila RN  Outcome: Progressing  Flowsheets (Taken 3/9/2025 2140)  Skin Integrity Remains Intact:   Monitor for areas of redness and/or skin breakdown   Assess vascular access sites hourly   Every 4-6 hours minimum: Change oxygen saturation probe site  3/9/2025 0900 by Marcela Mills RN  Outcome: Progressing  Flowsheets (Taken 3/9/2025 0830)  Skin Integrity Remains Intact: Monitor for areas of redness and/or skin breakdown     Problem: Pain  Goal: Verbalizes/displays adequate comfort level or baseline comfort level  3/9/2025 2140 by Juan Carlos Avila RN  Outcome: Progressing  Flowsheets (Taken 3/8/2025 1228 by Jaret Smart RN)  Verbalizes/displays adequate comfort level or baseline comfort level:   Encourage patient to monitor pain and request assistance   Assess pain using appropriate pain scale   Administer analgesics based on type and severity of pain and evaluate response  3/9/2025 0900 by Marcela Mills RN  Outcome: Progressing     Problem: Chronic Conditions and Co-morbidities  Goal: Patient's chronic conditions and co-morbidity symptoms are monitored and maintained or improved  3/9/2025 2140 by Juan Carlos Avila RN  Outcome: Progressing  Flowsheets (Taken 3/9/2025 0830 by Marcela Mills, RN)  Care 
  Problem: Safety - Adult  Goal: Free from fall injury  Outcome: Progressing     Problem: Skin/Tissue Integrity  Goal: Skin integrity remains intact  Description: 1.  Monitor for areas of redness and/or skin breakdown  2.  Assess vascular access sites hourly  3.  Every 4-6 hours minimum:  Change oxygen saturation probe site  4.  Every 4-6 hours:  If on nasal continuous positive airway pressure, respiratory therapy assess nares and determine need for appliance change or resting period  Outcome: Progressing     Problem: Pain  Goal: Verbalizes/displays adequate comfort level or baseline comfort level  Outcome: Progressing     Problem: Chronic Conditions and Co-morbidities  Goal: Patient's chronic conditions and co-morbidity symptoms are monitored and maintained or improved  Outcome: Progressing     Problem: Discharge Planning  Goal: Discharge to home or other facility with appropriate resources  Outcome: Progressing     Problem: Respiratory - Adult  Goal: Achieves optimal ventilation and oxygenation  Outcome: Progressing     Problem: Cardiovascular - Adult  Goal: Maintains optimal cardiac output and hemodynamic stability  Outcome: Progressing  Goal: Absence of cardiac dysrhythmias or at baseline  Outcome: Progressing     Problem: Skin/Tissue Integrity - Adult  Goal: Skin integrity remains intact  Description: 1.  Monitor for areas of redness and/or skin breakdown  2.  Assess vascular access sites hourly  3.  Every 4-6 hours minimum:  Change oxygen saturation probe site  4.  Every 4-6 hours:  If on nasal continuous positive airway pressure, respiratory therapy assess nares and determine need for appliance change or resting period  Outcome: Progressing  Goal: Incisions, wounds, or drain sites healing without S/S of infection  Outcome: Progressing     Problem: Musculoskeletal - Adult  Goal: Return mobility to safest level of function  Outcome: Progressing  Goal: Return ADL status to a safe level of function  Outcome: 
  Problem: Safety - Adult  Goal: Free from fall injury  Outcome: Progressing     Problem: Skin/Tissue Integrity  Goal: Skin integrity remains intact  Description: 1.  Monitor for areas of redness and/or skin breakdown  2.  Assess vascular access sites hourly  3.  Every 4-6 hours minimum:  Change oxygen saturation probe site  4.  Every 4-6 hours:  If on nasal continuous positive airway pressure, respiratory therapy assess nares and determine need for appliance change or resting period  Outcome: Progressing  Flowsheets (Taken 3/1/2025 0742)  Skin Integrity Remains Intact: Monitor for areas of redness and/or skin breakdown     Problem: Pain  Goal: Verbalizes/displays adequate comfort level or baseline comfort level  Outcome: Progressing     Problem: Chronic Conditions and Co-morbidities  Goal: Patient's chronic conditions and co-morbidity symptoms are monitored and maintained or improved  3/1/2025 0802 by Marcela Mills, RN  Outcome: Progressing  Flowsheets (Taken 3/1/2025 0742)  Care Plan - Patient's Chronic Conditions and Co-Morbidity Symptoms are Monitored and Maintained or Improved: Monitor and assess patient's chronic conditions and comorbid symptoms for stability, deterioration, or improvement     Problem: Cardiovascular - Adult  Goal: Absence of cardiac dysrhythmias or at baseline  Recent Flowsheet Documentation  Taken 3/1/2025 0742 by Marcela Mills, RN  Absence of cardiac dysrhythmias or at baseline: Monitor cardiac rate and rhythm     Problem: Skin/Tissue Integrity - Adult  Goal: Skin integrity remains intact  Description: 1.  Monitor for areas of redness and/or skin breakdown  2.  Assess vascular access sites hourly  3.  Every 4-6 hours minimum:  Change oxygen saturation probe site  4.  Every 4-6 hours:  If on nasal continuous positive airway pressure, respiratory therapy assess nares and determine need for appliance change or resting period  Outcome: Progressing  Flowsheets (Taken 3/1/2025 0742)  Skin 
  Problem: Safety - Adult  Goal: Free from fall injury  Outcome: Progressing     Problem: Skin/Tissue Integrity  Goal: Skin integrity remains intact  Description: 1.  Monitor for areas of redness and/or skin breakdown  2.  Assess vascular access sites hourly  3.  Every 4-6 hours minimum:  Change oxygen saturation probe site  4.  Every 4-6 hours:  If on nasal continuous positive airway pressure, respiratory therapy assess nares and determine need for appliance change or resting period  Outcome: Progressing  Flowsheets (Taken 3/2/2025 0800)  Skin Integrity Remains Intact: Monitor for areas of redness and/or skin breakdown     Problem: Pain  Goal: Verbalizes/displays adequate comfort level or baseline comfort level  Outcome: Progressing     Problem: Chronic Conditions and Co-morbidities  Goal: Patient's chronic conditions and co-morbidity symptoms are monitored and maintained or improved  Outcome: Progressing  Flowsheets (Taken 3/2/2025 0800)  Care Plan - Patient's Chronic Conditions and Co-Morbidity Symptoms are Monitored and Maintained or Improved: Monitor and assess patient's chronic conditions and comorbid symptoms for stability, deterioration, or improvement     Problem: Discharge Planning  Goal: Discharge to home or other facility with appropriate resources  Outcome: Progressing     Problem: Respiratory - Adult  Goal: Achieves optimal ventilation and oxygenation  Outcome: Progressing  Flowsheets (Taken 3/2/2025 0800)  Achieves optimal ventilation and oxygenation:   Assess for changes in respiratory status   Assess for changes in mentation and behavior     Problem: Cardiovascular - Adult  Goal: Maintains optimal cardiac output and hemodynamic stability  Outcome: Progressing  Flowsheets (Taken 3/2/2025 0800)  Maintains optimal cardiac output and hemodynamic stability:   Monitor blood pressure and heart rate   Monitor urine output and notify Licensed Independent Practitioner for values outside of normal range   
  Problem: Safety - Adult  Goal: Free from fall injury  Outcome: Progressing     Problem: Skin/Tissue Integrity  Goal: Skin integrity remains intact  Description: 1.  Monitor for areas of redness and/or skin breakdown  2.  Assess vascular access sites hourly  3.  Every 4-6 hours minimum:  Change oxygen saturation probe site  4.  Every 4-6 hours:  If on nasal continuous positive airway pressure, respiratory therapy assess nares and determine need for appliance change or resting period  Outcome: Progressing  Flowsheets (Taken 3/3/2025 2045)  Skin Integrity Remains Intact:   Monitor for areas of redness and/or skin breakdown   Assess vascular access sites hourly     Problem: Pain  Goal: Verbalizes/displays adequate comfort level or baseline comfort level  Outcome: Progressing     Problem: Chronic Conditions and Co-morbidities  Goal: Patient's chronic conditions and co-morbidity symptoms are monitored and maintained or improved  Outcome: Progressing  Flowsheets (Taken 3/3/2025 2045)  Care Plan - Patient's Chronic Conditions and Co-Morbidity Symptoms are Monitored and Maintained or Improved:   Monitor and assess patient's chronic conditions and comorbid symptoms for stability, deterioration, or improvement   Collaborate with multidisciplinary team to address chronic and comorbid conditions and prevent exacerbation or deterioration   Update acute care plan with appropriate goals if chronic or comorbid symptoms are exacerbated and prevent overall improvement and discharge     Problem: Discharge Planning  Goal: Discharge to home or other facility with appropriate resources  Outcome: Progressing     Problem: Respiratory - Adult  Goal: Achieves optimal ventilation and oxygenation  Outcome: Progressing     Problem: Cardiovascular - Adult  Goal: Maintains optimal cardiac output and hemodynamic stability  Outcome: Progressing  Flowsheets (Taken 3/3/2025 2045)  Maintains optimal cardiac output and hemodynamic stability:   Monitor 
  Problem: Safety - Adult  Goal: Free from fall injury  Outcome: Progressing  Flowsheets (Taken 3/6/2025 1459)  Free From Fall Injury:   Instruct family/caregiver on patient safety   Based on caregiver fall risk screen, instruct family/caregiver to ask for assistance with transferring infant if caregiver noted to have fall risk factors     Problem: Skin/Tissue Integrity  Goal: Skin integrity remains intact  Description: 1.  Monitor for areas of redness and/or skin breakdown  2.  Assess vascular access sites hourly  3.  Every 4-6 hours minimum:  Change oxygen saturation probe site  4.  Every 4-6 hours:  If on nasal continuous positive airway pressure, respiratory therapy assess nares and determine need for appliance change or resting period  Outcome: Progressing  Flowsheets (Taken 3/6/2025 1459)  Skin Integrity Remains Intact:   Monitor for areas of redness and/or skin breakdown   Assess vascular access sites hourly     Problem: Pain  Goal: Verbalizes/displays adequate comfort level or baseline comfort level  Outcome: Progressing  Flowsheets (Taken 3/6/2025 1459)  Verbalizes/displays adequate comfort level or baseline comfort level:   Encourage patient to monitor pain and request assistance   Assess pain using appropriate pain scale   Administer analgesics based on type and severity of pain and evaluate response     Problem: Chronic Conditions and Co-morbidities  Goal: Patient's chronic conditions and co-morbidity symptoms are monitored and maintained or improved  Outcome: Progressing  Flowsheets (Taken 3/6/2025 1459)  Care Plan - Patient's Chronic Conditions and Co-Morbidity Symptoms are Monitored and Maintained or Improved:   Monitor and assess patient's chronic conditions and comorbid symptoms for stability, deterioration, or improvement   Collaborate with multidisciplinary team to address chronic and comorbid conditions and prevent exacerbation or deterioration     Problem: Skin/Tissue Integrity - Adult  Goal: 
  Problem: Safety - Adult  Goal: Free from fall injury  Outcome: Progressing  Flowsheets (Taken 3/6/2025 2139 by Oumar Verde, RN)  Free From Fall Injury:   Instruct family/caregiver on patient safety   Based on caregiver fall risk screen, instruct family/caregiver to ask for assistance with transferring infant if caregiver noted to have fall risk factors     Problem: Skin/Tissue Integrity  Goal: Skin integrity remains intact  Description: 1.  Monitor for areas of redness and/or skin breakdown  2.  Assess vascular access sites hourly  3.  Every 4-6 hours minimum:  Change oxygen saturation probe site  4.  Every 4-6 hours:  If on nasal continuous positive airway pressure, respiratory therapy assess nares and determine need for appliance change or resting period  Outcome: Progressing  Flowsheets (Taken 3/6/2025 2139 by Oumar Verde RN)  Skin Integrity Remains Intact:   Monitor for areas of redness and/or skin breakdown   Assess vascular access sites hourly   Every 4-6 hours minimum: Change oxygen saturation probe site   Every 4-6 hours: If on nasal continuous positive airway pressure, respiratory therapy assesses nares and determine need for appliance change or resting period     Problem: Pain  Goal: Verbalizes/displays adequate comfort level or baseline comfort level  Outcome: Progressing  Flowsheets (Taken 3/6/2025 2139 by Oumar Verde RN)  Verbalizes/displays adequate comfort level or baseline comfort level:   Encourage patient to monitor pain and request assistance   Implement non-pharmacological measures as appropriate and evaluate response   Assess pain using appropriate pain scale   Consider cultural and social influences on pain and pain management   Administer analgesics based on type and severity of pain and evaluate response   Notify Licensed Independent Practitioner if interventions unsuccessful or patient reports new pain     
  Problem: Safety - Adult  Goal: Free from fall injury  Outcome: Progressing  Flowsheets (Taken 3/8/2025 1228)  Free From Fall Injury:   Instruct family/caregiver on patient safety   Based on caregiver fall risk screen, instruct family/caregiver to ask for assistance with transferring infant if caregiver noted to have fall risk factors     Problem: Skin/Tissue Integrity  Goal: Skin integrity remains intact  Description: 1.  Monitor for areas of redness and/or skin breakdown  2.  Assess vascular access sites hourly  3.  Every 4-6 hours minimum:  Change oxygen saturation probe site  4.  Every 4-6 hours:  If on nasal continuous positive airway pressure, respiratory therapy assess nares and determine need for appliance change or resting period  Outcome: Progressing  Flowsheets (Taken 3/8/2025 1228)  Skin Integrity Remains Intact:   Monitor for areas of redness and/or skin breakdown   Assess vascular access sites hourly     Problem: Pain  Goal: Verbalizes/displays adequate comfort level or baseline comfort level  Outcome: Progressing  Flowsheets (Taken 3/8/2025 1228)  Verbalizes/displays adequate comfort level or baseline comfort level:   Encourage patient to monitor pain and request assistance   Assess pain using appropriate pain scale   Administer analgesics based on type and severity of pain and evaluate response     Problem: Skin/Tissue Integrity - Adult  Goal: Skin integrity remains intact  Description: 1.  Monitor for areas of redness and/or skin breakdown  2.  Assess vascular access sites hourly  3.  Every 4-6 hours minimum:  Change oxygen saturation probe site  4.  Every 4-6 hours:  If on nasal continuous positive airway pressure, respiratory therapy assess nares and determine need for appliance change or resting period  Outcome: Progressing  Flowsheets (Taken 3/8/2025 1228)  Skin Integrity Remains Intact:   Monitor for areas of redness and/or skin breakdown   Assess vascular access sites hourly     Problem: 
  Problem: Safety - Adult  Goal: Free from fall injury  Recent Flowsheet Documentation  Taken 2/28/2025 0830 by Nichole Pinon RN  Free From Fall Injury:   Instruct family/caregiver on patient safety   Based on caregiver fall risk screen, instruct family/caregiver to ask for assistance with transferring infant if caregiver noted to have fall risk factors     Problem: Skin/Tissue Integrity  Goal: Skin integrity remains intact  Description: 1.  Monitor for areas of redness and/or skin breakdown  2.  Assess vascular access sites hourly  3.  Every 4-6 hours minimum:  Change oxygen saturation probe site  4.  Every 4-6 hours:  If on nasal continuous positive airway pressure, respiratory therapy assess nares and determine need for appliance change or resting period  Recent Flowsheet Documentation  Taken 2/28/2025 0830 by Nichole Pinon RN  Skin Integrity Remains Intact: Monitor for areas of redness and/or skin breakdown     Problem: Chronic Conditions and Co-morbidities  Goal: Patient's chronic conditions and co-morbidity symptoms are monitored and maintained or improved  Recent Flowsheet Documentation  Taken 2/28/2025 0830 by Nichole Pinon RN  Care Plan - Patient's Chronic Conditions and Co-Morbidity Symptoms are Monitored and Maintained or Improved: Monitor and assess patient's chronic conditions and comorbid symptoms for stability, deterioration, or improvement     Problem: Discharge Planning  Goal: Discharge to home or other facility with appropriate resources  Recent Flowsheet Documentation  Taken 2/28/2025 0830 by Nichole Pinon RN  Discharge to home or other facility with appropriate resources: Identify barriers to discharge with patient and caregiver     
Progressing     Problem: Cardiovascular - Adult  Goal: Maintains optimal cardiac output and hemodynamic stability  3/9/2025 0900 by Marcela Mills, RN  Outcome: Progressing     Problem: Cardiovascular - Adult  Goal: Absence of cardiac dysrhythmias or at baseline  3/9/2025 0900 by Marcela Mills, RN  Outcome: Progressing     Problem: Skin/Tissue Integrity - Adult  Goal: Skin integrity remains intact  Description: 1.  Monitor for areas of redness and/or skin breakdown  2.  Assess vascular access sites hourly  3.  Every 4-6 hours minimum:  Change oxygen saturation probe site  4.  Every 4-6 hours:  If on nasal continuous positive airway pressure, respiratory therapy assess nares and determine need for appliance change or resting period  3/9/2025 0900 by Marcela Mills, RN  Outcome: Progressing  Flowsheets (Taken 3/9/2025 0830)  Skin Integrity Remains Intact: Monitor for areas of redness and/or skin breakdown     Problem: Musculoskeletal - Adult  Goal: Return ADL status to a safe level of function  3/9/2025 0900 by Marcela Mills, RN  Outcome: Progressing  Flowsheets (Taken 3/9/2025 0830)  Return ADL Status to a Safe Level of Function: Administer medication as ordered     Problem: Gastrointestinal - Adult  Goal: Maintains or returns to baseline bowel function  3/9/2025 0900 by Marcela Mills, RN  Outcome: Progressing     Problem: Gastrointestinal - Adult  Goal: Maintains adequate nutritional intake  3/9/2025 0900 by Marcela Mills, RN  Outcome: Progressing     Problem: Genitourinary - Adult  Goal: Absence of urinary retention  3/9/2025 0900 by Marcela Mills, RN  Outcome: Progressing     Problem: Infection - Adult  Goal: Absence of infection at discharge  3/9/2025 0900 by Marcela Mills, RN  Outcome: Progressing  Flowsheets (Taken 3/9/2025 0830)  Absence of infection at discharge: Assess and monitor for signs and symptoms of infection     Problem: Infection - Adult  Goal: Absence of infection during

## 2025-03-11 NOTE — CARE COORDINATION
03/11/25 1209   IMM Letter   IMM Letter given to Patient/Family/Significant other/Guardian/POA/by: Betty Ambriz   IMM Letter date given: 03/11/25   IMM Letter time given: 1140       Medicare pt has received, reviewed, and signed 2nd IM letter informing them of their right to appeal the discharge.  Signed copy has been placed on pt bedside chart.

## 2025-03-11 NOTE — DISCHARGE SUMMARY
Physician Discharge Summary     Patient ID:  Jorden Artis  [id number]  64 y.o.  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    Procedures for this admission: Procedure(s):  Right leg angiogram with interpretation/ Balloon angioplasty of femoral to tibial bypass conduit./ Balloon angioplasty and stent of right anterior tibial artery/ [facility]    Discharged Condition: stable    Hospital Course: admitted with acute occlusion of right cadaver saphenous ax fem bpg to right anterior tibial artery. Tolerated thrombectomy, had stent of bpg inflow, eventual anterior tibial artery bioabsorbable stent. Now has palpable pulse    Consults: cardiology    Significant Diagnostic Studies: angiography: right leg    Treatments: thrombectomy, angioplasty and stent. Evangelina Fuentes anterior tibila    Discharge Exam: LUNG: clear to auscultation bilaterally  HEART: regularly irregular rhythm  ABDOMEN:  no change  Palpable pulse, incisions cl;lean    Disposition: home    Patient Instructions:   Current Discharge Medication List        START taking these medications    Details   clopidogrel (PLAVIX) 75 MG tablet Take 1 tablet by mouth daily  Qty: 30 tablet, Refills: 3           CONTINUE these medications which have NOT CHANGED    Details   metoprolol tartrate (LOPRESSOR) 50 MG tablet Take 1 tablet by mouth in the morning, at noon, and at bedtime  Qty: 270 tablet, Refills: 3      furosemide (LASIX) 40 MG tablet Take 1 tablet by mouth 2 times daily  Qty: 180 tablet, Refills: 3      pregabalin (LYRICA) 75 MG capsule Take 1 capsule by mouth 2 times daily.      Multiple Vitamin 
independent

## 2025-03-11 NOTE — CARE COORDINATION
D/C order noted for today. Orders reviewed. No needs identified at this time. CM remains available if needed. Per pt, brother will be picking him up.    Betty Ambriz RN  Care Management

## 2025-03-12 NOTE — PROGRESS NOTES
Physician Progress Note      PATIENT:               DIOR ENCISO  CSN #:                  [id number]  :                       1960  ADMIT DATE:       2025 5:48 AM  DISCH DATE:  RESPONDING  PROVIDER #:        Ayad Sesay DO          QUERY TEXT:    Pt admitted with occlusion of R bypass graft after having undergone a   thrombectomy to bypass graft in May 2024. If possible, please document in   progress notes and discharge summary the relationship if any between the   current occlusion and the thrombectomy in May 2024:  ?  The medical record reflects the following:  Risk Factors: ***  Clinical Indicators:    DCS 24:  In the emergency room patient noted to have right leg bypass graft occlusion.    Patient was started on IV heparin drip and was admitted to hospital  Patient underwent angiogram and thrombectomy on 2024.    Op 3/3:  Right leg femoral bypass thrombectomy.  Balloon angioplasty and stent at the origin of right femoral to tibial bypass  Balloon angioplasty of anterior tibial artery  Right leg angiogram with interpretation    Treatment: thrombectomy; Asa; Heparin  Options provided:  -- Occlusion of R bypass graft is due to the 24 procedure  -- Occlusion of bypass graft is not due to the 24 procedure, but is due to   other incidental risk factor, Please specify other incidental risk factor.  -- Other - I will add my own diagnosis  -- Disagree - Not applicable / Not valid  -- Disagree - Clinically unable to determine / Unknown  -- Refer to Clinical Documentation Reviewer    PROVIDER RESPONSE TEXT:    Patient has occlusion of bypass graft due to the 24 procedure.    Query created by: Dominique Marcum on 3/4/2025 8:47 AM      Electronically signed by:  Ayad Sesay DO 3/4/2025 10:40 AM          
  Physician Progress Note      PATIENT:               DIOR ENCISO  CSN #:                  [id number]  :                       1960  ADMIT DATE:       2025 5:48 AM  DISCH DATE:        3/11/2025 2:19 PM  RESPONDING  PROVIDER #:        Ayad Sesay DO          QUERY TEXT:    Patient noted to have persistent atrial fibrillation and is maintained on   Eliquis. If possible, please document in progress notes and discharge summary   if you are evaluating and/or treating any of the following:?  ?  The medical record reflects the following:  Risk Factors: 63yo, CHF, thrombus    Clinical Indicators:    Link :  AF with RVR with history of longstanding persistent AF. Will give a 1x dose of   IV lopressor. On outpatient PO lopressor. On Eliquis for AC as outpatient.    Treatment: Eliquis  Options provided:  -- Secondary hypercoagulable state in a patient with atrial fibrillation  -- Other - I will add my own diagnosis  -- Disagree - Not applicable / Not valid  -- Disagree - Clinically unable to determine / Unknown  -- Refer to Clinical Documentation Reviewer    PROVIDER RESPONSE TEXT:    This patient has secondary hypercoagulable state in a patient with atrial   fibrillation.    Query created by: Dominique Marcum on 3/4/2025 8:27 AM      Electronically signed by:  Ayad Sesay DO 3/12/2025 10:04 AM          
 Bedside and Verbal shift change report given to Eleno Allen RN (oncoming nurse) by Nichole Pinon RN (offgoing nurse). Report included the following information Nurse Handoff Report, Intake/Output, MAR, Recent Results, Med Rec Status, and Cardiac Rhythm Afib. Will resume care and monitor Pt. Condition.    Pt. Educated on call bell when in need of help and assistance.      
02/27/25  2030  Transfer from CVICU to CVT Stepdown. S/p Thrombectomy and revision of R leg bypass graft.     R pedal pulse present with doppler.  Pt A/Ox4. No c/o pain.    Incision noted to R lateral thigh with surgical glue present. L AKA with redness noted, pt states he typically wears prosthetic at home but has been letting the site heal.     2100  Heparin gtt started per order.    2353  R pedal pulse present with doppler. Surgical incision intact.    02/28/25  0335  R pedal pulse present with doppler. Surgical incision intact.     0553  Critical K 2.8 w/ AM labs. No protocol, contacted [facility]  who has  on call, which she politely answered and said she is not on call.     0600  Called Vascular number [phone], left call back number and message with .    0645  Spoke with , PRN K replacement ordered.    0703  Pt unable to tolerate IV replacement even with NS diluting. Reached back out to .  
0945 - Received patient from OR. Patient is awake, denies pain, and has palpable pulse of he graft just below the incision.     1000 - Cardiology PA at bedside for uncontrolled Afib/RVR.     1200 - Patient belongings brought over from PACU.    1300 - Dr Mccormack at bedside, graft site pulse now requiring doppler.    1310 - Dr Valenzuela at bedside.     1515 - Echo in progress.     1900 - Report given to VICKI Guerrero. Graft site is weakly pulsatile to palpation and audible via doppler inferior to the thrombectomy incision.   
2045> received from PACU. AOX4, on room air, sats 97%; v/s checked, shift assessment completed; hooked back on tele reading Afib    2111> meds given; po fluids given        >moss draining well        > right foot pulses noted with doppler       >phlebotomy called; notified for stat PTT    2121> Prn Dilaudid 0.5 mg IV given for c/o R leg pain- see flowsheet    2150> resting comfortably    0030: Heparin Drip infusing well    0319> prn Dilaudid IV given for c/o incision pain    0530> awake, refused CHG bath  \  Bedside and Verbal shift change report given to RODRIGO Smart RN (oncoming nurse) by REINALDO Judd RN (offgoing nurse). Report included the following information Nurse Handoff Report, Index, Intake/Output, Recent Results, and Cardiac Rhythm Afib .    
A system downtime occurred on March 9,2025(60 minutes) due to Daylight Savings Eastern Standard Time.  During this downtime, paper charting was completed and will be scanned into the patient's electronic medical record.   
Advance Care Planning   Healthcare Decision Maker:    Primary Decision Maker: Isis Artis - Chad - [phone]    Click here to complete Healthcare Decision Makers including selection of the Healthcare Decision Maker Relationship (ie \"Primary\").    Spiritual Health History and Assessment/Progress Note  [facility]    Spiritual/Emotional Needs,  ,  ,      Name: Jorden Artis MRN: [id number]    Age: 64 y.o.     Sex: male   Language: English   Religious: Other   Occlusion of right femoral artery     Date: 2/28/2025            Total Time Calculated: 7 min              Spiritual Assessment began in [facility] 2 CV STEPDOWN        Referral/Consult From: Rounding   Encounter Overview/Reason: Spiritual/Emotional Needs  Service Provided For: Patient    Janis, Belief, Meaning:   Patient has beliefs or practices that help with coping during difficult times  Family/Friends No family/friends present      Importance and Influence:  Patient has spiritual/personal beliefs that influence decisions regarding their health  Family/Friends No family/friends present    Community:  Patient feels well-supported. Support system includes: Children  Family/Friends No family/friends present    Assessment and Plan of Care:     Patient Interventions include: Facilitated expression of thoughts and feelings, Affirmed coping skills/support systems, and Provided sacramental/Bahai ritual  Family/Friends Interventions include: No family/friends present    Patient Plan of Care: Spiritual Care available upon further referral  Family/Friends Plan of Care: No family/friends present    Electronically signed by Chaplain Fortino on 2/28/2025 at 11:34 AM          
Alert and stable no new complaints  Vital signs are stable  Await observable stent delivery  Plan for angiogram right leg with balloon angioplasty and stent of tibial vessel  
Bedside and Verbal shift change report given  by ISHAN Mills RN (offgoing nurse). Report included the following information Nurse Handoff Report, Index, Intake/Output, Recent Results, and Cardiac Rhythm Afib .      >NPO after MN for procedure in AM    Bedside and Verbal shift change report given to KHALIDA Lopez RN (oncoming nurse) by REINALDO Judd  (offgoing nurse). Report included the following information Nurse Handoff Report, Index, Intake/Output, Recent Results, and Cardiac Rhythm Afib .    
Bedside and Verbal shift change report given to Eleno Allen RN (oncoming nurse) by Priscila Marks RN (offgoing nurse). PReport included the following information Nurse Handoff Report, Intake/Output, MAR, Recent Results, Med Rec Status, and Cardiac Rhythm Afib. Will resume care and monitor Pt. Condition.    Pt. Educated on call bell when in need of help and assistance.      
Bedside and Verbal shift change report given to Jaret Smart RN   (oncoming nurse) by Eleno COHEN (offgoing nurse). Report included the following information Nurse Handoff Report, Index, MAR, Recent Results, and Cardiac Rhythm Afib .       
Bedside and Verbal shift change report given to Jaret Smart RN   (oncoming nurse) by Juan Carlos COHEN (offgoing nurse). Report included the following information Nurse Handoff Report, Index, MAR, Recent Results, and Cardiac Rhythm Afib .     
Bedside and Verbal shift change report given to Jaret Smart RN   (oncoming nurse) by Maggy COHEN (offgoing nurse). Report included the following information Nurse Handoff Report, Index, MAR, Recent Results, and Cardiac Rhythm Afib .     
Bedside and Verbal shift change report given to Jaret Smart RN   (oncoming nurse) by Oumar COHEN (offgoing nurse). Report included the following information Nurse Handoff Report, Index, MAR, Recent Results, and Cardiac Rhythm Afib .     
Bedside and Verbal shift change report given to Jaret Smart RN   (oncoming nurse) by Oumar COHEN (offgoing nurse). Report included the following information Nurse Handoff Report, Index, MAR, Recent Results, and Cardiac Rhythm Afib .     
Bedside and Verbal shift change report given to Tracey Tom RN (oncoming nurse) by Marcela RN (offgoing nurse). Report included the following information Nurse Handoff Report, Adult Overview, and Cardiac Rhythm A fib .     0604:  Patient had 22 beats of V-tach. CMO with reflex mag ordered after attempting to contact Dr. Sesay.  
Bedside and Verbal shift change report given to VICKI Clements (oncoming nurse) by VICKI Rangel (offgoing nurse). Report included the following information Adult Overview, Recent Results, and Cardiac Rhythm afib .     
Bedside and Verbal shift change report given to VICKI Medrano (oncoming nurse) by VICKI brock (offgoing nurse). Report included the following information Adult Overview, Recent Results, Med Rec Status, and Cardiac Rhythm afib .     
Bedside shift change report given to VICKI Oseguera (oncoming nurse) by VICKI Raygoza (offgoing nurse). Report included the following information Nurse Handoff Report, Recent Results, Cardiac Rhythm AFIB, and Alarm Parameters.      Bedside shift change report given to VICKI Francois (oncoming nurse) by VICKI Oseguera (offgoing nurse). Report included the following information Nurse Handoff Report, Recent Results, Cardiac Rhythm AFIB, and Alarm Parameters.    
Bedside shift change report given to VICKI Oseguera (oncoming nurse) by VICKI Raygoza (offgoing nurse). Report included the following information Nurse Handoff Report, Recent Results, Cardiac Rhythm AFIB, and Alarm Parameters.      Bedside shift change report given to VICKI Raygoza (oncoming nurse) by VICKI Oseguera (offgoing nurse). Report included the following information Nurse Handoff Report, Recent Results, Cardiac Rhythm AFIB, and Alarm Parameters.    
Bedside shift change report given to VICKI Oseguera (oncoming nurse) by VICKI Raygoza (offgoing nurse). Report included the following information Nurse Handoff Report, Recent Results, Cardiac Rhythm NSR, and Alarm Parameters.      2022: pt had 12 beats of VTAC on monitor, denies chest pain, or discomfort, VS checked /80, .  2030:labs drawn for BMP and Mg.  2230: spoken to DR Hernandes and said cardio was on board DR Valenzuela.  2323: K replaced as per protocol.    Bedside shift change report given to VICKI Raygoza (oncoming nurse) by VICKI Oseguera (offgoing nurse). Report included the following information Nurse Handoff Report, Recent Results, Cardiac Rhythm AFIB, and Alarm Parameters.      
Cardiology Progress Note    Admit Date: 2/27/2025  Attending Cardiologist: Dr. Gordon    IMPRESSION  Status post thrombectomy revision of right leg bypass graft with vascular surgery with history of femoral to tibial cadaver vein bypass graft on the right  Atrial fibrillation with rapid ventricular response with history of longstanding persistent atrial fibrillation  Coronary artery disease status post left circumflex and RCA PCI 2017, low risk nuclear stress test June 2023  Chronic heart failure reduced ejection fraction with recovered ejection fraction, 2D echo February 2025 ejection fraction 50 to 55%  Peripheral arterial disease with previous left above-knee amputation  Hypertension - normotensive  Hyperlipidemia  Hypokalemia     PLAN  Monitor fluid status, adjust as necessary, fluid restriction, salt intake <2g per day.  Continue Lasix 20 mg p.o. daily, -571 cc from 3/3/2025  Recommend Guideline Directed medical therapy as can tolerate.  Monitor blood pressure, adjust antihypertensive medications as necessary. Continue metoprolol tartrate 50mg po BID.  Patient states that he is no longer taking diltiazem.    Statin if can tolerate prior to discharge.  Continue Lipitor 80 mg p.o. at bedtime  Recommend correct electrolytes, Potassium >4, Magnesium >2  Continue aspirin 81 mg p.o. daily  Holding Eliquis 5 mg p.o. twice daily when okay with vascular surgery, there with plans for angiogram repeat thrombectomy on Monday.    Continue cilostazol 50 mg p.o. twice daily  Patient currently on heparin gtt.     Primary cardiologist Dr. Valenzuela    Subjective:     Denies chest pain  Denies shortness of breath  Denies abdominal pain    Objective:      Patient Vitals for the past 8 hrs:   Temp Pulse Resp BP SpO2   03/02/25 0721 97.7 °F (36.5 °C) 89 18 122/83 98 %   03/02/25 0328 97.6 °F (36.4 °C) 78 17 125/74 97 %         Patient Vitals for the past 96 hrs:   Weight   03/01/25 0306 89.5 kg (197 lb 4.8 oz)   02/27/25 1503 88.5 kg 
Cardiology Progress Note    Admit Date: 2/27/2025  Attending Cardiologist: Dr. Gordon    IMPRESSION  Status post thrombectomy revision of right leg bypass graft with vascular surgery with history of femoral to tibial cadaver vein bypass graft on the right  Atrial fibrillation with rapid ventricular response with history of longstanding persistent atrial fibrillation  Coronary artery disease status post left circumflex and RCA PCI 2017, low risk nuclear stress test June 2023  Chronic heart failure reduced ejection fraction with recovered ejection fraction, 2D echo February 2025 ejection fraction 50 to 55%  Peripheral arterial disease with previous left above-knee amputation  Hypertension - normotensive  Hyperlipidemia  Hypokalemia    PLAN  Monitor fluid status, adjust as necessary, fluid restriction, salt intake <2g per day.  Continue Lasix 20 mg p.o. daily, from 3/1/2025 -222cc  Recommend Guideline Directed medical therapy as can tolerate.  Monitor blood pressure, adjust antihypertensive medications as necessary. Continue metoprolol tartrate 50mg po BID.  Patient states that he is no longer taking diltiazem.    Statin if can tolerate prior to discharge.  Continue Lipitor 80 mg p.o. at bedtime  Recommend correct electrolytes, Potassium >4, Magnesium >2  Continue aspirin 81 mg p.o. daily  Holding Eliquis 5 mg p.o. twice daily when okay with vascular surgery, there with plans for angiogram repeat thrombectomy on Monday.    Continue cilostazol 50 mg p.o. twice daily  Patient currently on heparin gtt.    Primary cardiologist Dr. Valenzuela    Subjective:     Denies chest pain  Denies shortness of breath  Denies abdominal pain    Objective:      Patient Vitals for the past 8 hrs:   Temp Pulse Resp BP SpO2   03/01/25 0715 97.9 °F (36.6 °C) 79 18 108/75 98 %   03/01/25 0306 97.9 °F (36.6 °C) 85 18 109/83 97 %   03/01/25 0119 -- -- 17 -- --         Patient Vitals for the past 96 hrs:   Weight   03/01/25 0306 89.5 kg (197 lb 4.8 
[facility] - Progress Note    Admit Date: 2/27/2025  Attending Cardiologist: Dr. Scott     Assessment:     - S/p thrombectomy and revision of right leg bypass graft with vascular surgery. Hx of femoral to tibial cadaver vein bypass graft on the right.  - AF with RVR with history of longstanding persistent AF. On PO lopressor. Will restart PO cardizem as EF is nearly recovered. On Eliquis for AC as outpatient.  - CAD s/p LCx and RCA PCI 2017. Low risk NST 6/2023. On asa as outpatient  - History HFrEF with recovered EF. Does not appear volume overloaded. On PO lasix 40mg   Echo 2/2025 EF 50-55%, normal wall motion  Echo 1/2025 EF < 25%, CYNDI  Echo 5/2024  EF 50-55% on Echo 5/2024, has been as low as 30% in the past.  - PAD with previous left AKA.  - Hypertension: BPs normotensive.  - Hyperlipidemia.   - Hypokalemia     Primary cardiologist: Dr. Valenzuela    Plan:    - Patient seen and examined independently.  Patient presently has reasonable control of his atrial fibrillation.  He seems stable on his current dosages of calcium and beta-blockers.  Will sign off and be available.  Agree with note of KAMRAN Smith.  YANDEL Scott MD  - Telemetry reviewed, patient in rate controlled AF. Continue Cardizem CD 120mg in the morning and 180mg in the evening.   - Continue asa, potent statin, and BB with CAD hx.   - Will sign off and be available as needed. He has outpatient follow up scheduled on 3/17/25.     Subjective:     No new complaints.     Objective:      Patient Vitals for the past 8 hrs:   Temp Pulse Resp BP SpO2   03/04/25 1104 98.4 °F (36.9 °C) 98 18 107/88 95 %   03/04/25 1036 -- -- 18 -- --   03/04/25 1006 -- -- 18 115/71 --   03/04/25 0800 97.9 °F (36.6 °C) 98 18 124/84 99 %         Patient Vitals for the past 96 hrs:   Weight   03/04/25 0319 84.4 kg (186 lb)   03/03/25 0400 89.8 kg (198 lb)   03/01/25 0306 89.5 kg (197 lb 4.8 oz)       TELE: AFIB               Current Facility-Administered 
[facility] - Progress Note    Admit Date: 2/27/2025  Attending Cardiologist: Dr. Valenzuela     Assessment:     - S/p thrombectomy and revision of right leg bypass graft with vascular surgery. Hx of femoral to tibial cadaver vein bypass graft on the right.  - AF with RVR with history of longstanding persistent AF. On PO lopressor. Will restart PO cardizem as EF is nearly recovered. On Eliquis for AC as outpatient.  - CAD s/p LCx and RCA PCI 2017. Low risk NST 6/2023. On asa as outpatient  - History HFrEF with recovered EF. Does not appear volume overloaded. On PO lasix 40mg   Echo 2/2025 EF 50-55%, normal wall motion  Echo 1/2025 EF < 25%, CYNDI  Echo 5/2024  EF 50-55% on Echo 5/2024, has been as low as 30% in the past.  - PAD with previous left AKA.  - Hypertension: BPs normotensive.  - Hyperlipidemia.   - Hypokalemia     Primary cardiologist: Dr. Valenzuela    Plan:     Addendum: Independently seen and evaluated.  Agree with below.  He has a longstanding history of chronic atrial fibrillation.  He remains RVR.  He is without complaints of shortness of breath or chest pains.  Would restart Cardizem CD.  He has recurrence of right lower extremity thrombus formation.  He is scheduled to undergo repeat thrombectomy by vascular surgery.    - Telemetry reviewed, patient in uncontrolled AF this morning, ventricular rates 120s. With EF nearly recovered, plan is to restart Cardizem CD 120mg in the morning and 180mg in the evening that patient was previously on.  - Check mag level this AM. Keep K > 4 and Mg > 2.   - Tentative plan today to go for angiogram with possible thrombectomy with Dr. Sesay. Currently on heparin infusion, Eliquis on hold.   - Continue asa, potent statin, and BB with CAD hx.     Subjective:     No new complaints.     Objective:      Patient Vitals for the past 8 hrs:   Temp Pulse Resp BP SpO2   03/03/25 0733 98.1 °F (36.7 °C) 86 18 (!) 157/121 98 %   03/03/25 0400 98.1 °F (36.7 °C) 95 18 (!) 
Comprehensive Nutrition Assessment    Type and Reason for Visit:  Initial, LOS    Nutrition Recommendations/Plan:   PO intake is variable but for most meals %, likely meeting estimated needs.  Continue Regular diet, weight stable     Malnutrition Assessment:  Malnutrition Status: No indicators of malnutrition    Nutrition History and Allergies:   axillofemoral bypass graft, femoral to tibial cadaver vein bypass graft, L-AKA, HLD, chronic ulcers, CHF, vitamin D deficiency, HTN, Afib, PAD, cardiomyopathy, spinal stenosis    Past Medical History:   Diagnosis Date    Acute blood loss as cause of postoperative anemia 4/4/2017    Anticoagulated on Coumadin     Aortoiliac occlusive disease (HCC) 1/25/2017    Atherosclerosis of native artery of both lower extremities with intermittent claudication 1/25/2017    Benign hypertensive heart disease with systolic congestive heart failure, NYHA class 2 ([facility]) 1/26/2015    Carotid artery disease     Chronic anemia     Chronic systolic heart failure (HCC)     Chronic ulcer of right foot (HCC) 4/4/2017    Chronic ulcer of right heel (HCC) 8/8/2017    Coronary artery disease involving native coronary artery of native heart 3/15/2017    Successful stenting of Cx (Xience KARINE) and RCA (Xience KARINE) to 0% by Dr. Valenzuela on 3/15/17.    DDD (degenerative disc disease), lumbar 1/26/2015    Dyslipidemia     Erectile dysfunction 7/5/2016    Euthyroid sick syndrome 4/6/2017    Hereditary peripheral neuropathy 11/15/2016    History of cardioversion 04/18/2017    S/P Synchronized external cardioversion (4/18/2017 - Dr. Daniel Morales)    History of vitamin D deficiency 4/22/2017    Vitamin D 25-Hydroxy (4/22/2017) = 12.0; Vitamin D 25-Hydroxy (5/26/2017) = 38.7    Hyperlipidemia     Hypertension     Infection of vascular bypass graft 7/24/2017    Left lower extremity    Ischemic cardiomyopathy     Moderate to severe pulmonary hypertension (HCC) 07/23/2017    Paroxysmal atrial fibrillation (HCC)  
DC instruction given to pt.  PIV and tele mon removed.  Belongings with pt.  
Instructions for your surgery at [facility]      Today's Date:  2/26/2025      Patient's Name:  Jorden Artis           Surgery Date:  02/27/2025              Please enter the main entrance of the hospital and check-in at the front security desk located in the lobby. They will direct you to the area to report for your surgery.     Do NOT eat or drink anything, including candy, gum, or ice chips after midnight prior to your surgery, unless you have specific instructions from your surgeon or anesthesia provider to do so.  Brush your teeth before coming to the hospital. You may swish with water, but do not swallow.  No smoking/Vaping/E-Cigarettes 24 hours prior to the day of surgery.  No alcohol 24 hours prior to the day of surgery.  No recreational drugs for one week prior to the day of surgery.  Bring Photo ID, Insurance information, and Co-pay if required on day of surgery.  Bring in pertinent legal documents, such as, Medical Power of , DNR, Advance Directive, etc.  Leave all valuables, including money/purse, weapons at home.  Remove all jewelry, including ALL body piercings, nail polish, acrylic nails, and makeup (including mascara); no lotions, powders, deodorant, or perfume/cologne/after shave on the skin.  Follow instruction for Hibiclens washes and CHG wipes from surgeon's office.   Glasses and dentures may be worn to the hospital. They must be removed prior to surgery. Please bring case/container for glasses or dentures.   Contact lenses should not be worn on day of surgery.   Call your doctor's office if symptoms of a cold or illness develop within 24-48 hours prior to your surgery.  Call your doctor's office if you have any questions concerning insurance or co-pays.  15. AN ADULT (relative or friend 18 years or older) MUST DRIVE YOU HOME AFTER YOUR SURGERY.  16. Please make arrangements for a responsible adult (18 years or older) to be with you for 24 hours after your 
OT orders received and medical chart review completed.   Pt reports he is at baseline as Mod I with ADLs and functional mobility using RW, w/c and prosthetic leg. Pt issued reacher for item retrieval and prevention of falls d/t report that the one he owned was broken. Formal OT evaluation not indicated at this time. OT to sign off.     
PHYSICAL THERAPY EVALUATION/DISCHARGE    Patient: Jorden Artis (64 y.o. 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 Independent at baseline using w/c vs. RW. Has L prosthesis; however, has not used in last few weeks due to LLE wound/redness.       ASSESSMENT AND RECOMMENDATIONS:  Patient seen for PT evaluation. Received supine; agreeable, pleasant. Presenting with mild activity tolerance and chronic RLE sensation deficits. Despite this, able to complete all aspects of functional mobility modified independently using RW. Cues provided for activity pacing and line/lead awareness. Appropriate balance strategies in place considering RLE single leg stance. Not utilizing L prosthesis at this time secondary to reports of wound/redness on residual limb. No acute PT needs identified. PT to sign off.    Patient does not require further skilled physical therapy intervention at this level of care.    Further Equipment Recommendations for Discharge: Patient has all needed DME for home safety.    [facility]: AM-PAC Inpatient Mobility Raw Score : 24      At this time and based on an AM-PAC score, no further PT is recommended upon discharge.  Recommend patient returns to prior setting with prior services.    This [facility] score should be considered in conjunction with interdisciplinary team recommendations to determine the most appropriate discharge setting. Patient's social support, diagnosis, medical stability, and prior level of function should also be taken into consideration.     SUBJECTIVE:   Patient stated “I remember you!”    OBJECTIVE DATA SUMMARY:     Past Medical History:   Diagnosis Date 
Patient has left above knee amputation and his prosthetic is at home. Patient not able to walk in the halls. Patient ambulates to bedside commode.   
Pt had 2 5 beat runs of V-tach and a 9 beat run of V-tach. Pt asymptomatic denying chest pain or SOB. Dr. Sesay paged. Awaiting response  
Received bedside report from pm RN, completed heparin drip rate check and pt assessment.    Administered am meds, gave report to pre-op, pt repositioned himself. Pre-op RN confirmed to maintain heparin drip.     0840 administered meds, gave report to pre-op nurse.     1000 completed pre-op checklist, flushed Ivs    1121 removed telemetry monitor, emptied urinal, pt transported to pre-op    1805 received report from VICKI Arce from PACU    1835 Pt arrived from PACU, VS taken, assessed incision sites & pulses, administered med, provided dinner to pt  
Resting sitting up in chair with no complaints  Vital signs and labs are stable today  No new complaints  Faint signal in right leg bypass  Plan for angiogram possible thrombectomy Monday  
Saw patient early awakens easily.  Tells me he has not ischemic pain has not returned.  Ambulating about the room with no new particular complaint  Doppler reviewed, graft with monophasic signal throughout.  Inflow stent appears patent.  Expecting bioabsorbable stent to arrive sometime today, not here yet.  Anticipate Monday for angiogram right leg with balloon angioplasty and stent of right anterior tibial artery.  These represent maximal efforts to optimize this axillary femoral bypass graft to the anterior tibial artery with cadaver saphenous conduit.  Patient understands and is agreeable to proceed in this fashion.  
Seen at bedside he is alert and comfortable  Says he has no ischemic symptoms to his foot today  On exam his incision sites clean and intact  He has a faint pulse in the bypass    Will plan for angiogram or repeat thrombectomy on Monday continue heparin for now  Goal is to get a nice strong pulse and hopefully long-lasting  He is agreeable  
Sitting up in bed tells me his ischemic symptoms have resolved no further pain or numbness  Surgery sites are clean  Vital signs and labs are stable  He now has an excellent pulse in the bypass graft and dorsalis pedis  Stented the inflow with a drug-eluting stent, will need antiplatelet and anticoagulation  Outflow in question  Will Doppler tomorrow, consider dissolvable stent  
Sitting up in chair dozing but awakens easily  Vital signs are stable  Labs are reviewed and stable  Doppler signal present over right leg conduit  Surgery Monday hopefully to optimize bypass graft  
TRANSFER - IN REPORT:    Verbal report received from ELIZABETH Gutiérrez on Jorden Artis being received from PACU for routine progression of care.     Report consisted of patient’s Situation, Background, Assessment and Recommendations(SBAR).     Information from the following report(s) SBAR, Kardex, Procedure Summary, Intake/Output, and Cardiac Rhythm Afib  was reviewed. Opportunity for questions and clarification was provided.      Assessment completed upon patient’s arrival to unit and care assumed.     2035> Patient received to room 2302. Patient connected to monitor and assessment completed. Plan of care reviewed. Patient oriented to room and call light. Patient aware to use call light to communicate any chest pain or needs.     Admission skin assessment completed with second RN and reveals the following: surgical incision   
Tolerating diet, maintaining good spirits  Vital signs and labs are stable  Right leg x-ray anatomical conduit with positive Doppler signal  Foot is pink and warm no signs of ischemia  Surgery tomorrow with application of absorbable stent to the right anterior tibial artery, hopefully to preserve multiphasic flow in this graft.  Have been notified several times about short runs of V-fib.  Patient does have atrial fibrillation.  Dr. Valenzuela and his associates have been following, no change in current care.  
        Intake/Output Summary (Last 24 hours) at 2/28/2025 1013  Last data filed at 2/28/2025 0417  Gross per 24 hour   Intake 1648.26 ml   Output 1910 ml   Net -261.74 ml       Physical Exam:  General:  alert, appears stated age, and cooperative  Neck:  no JVD  Lungs:  clear to auscultation bilaterally  Heart:  irregularly irregular rhythm  Abdomen:  abdomen is soft without significant tenderness, masses, organomegaly or guarding  Extremities:  extremities normal, atraumatic, no cyanosis or edema    Visit Vitals  BP (!) 142/87   Pulse 87   Temp 97.9 °F (36.6 °C) (Oral)   Resp 18   Ht 1.778 m (5' 10\")   Wt 88.5 kg (195 lb)   SpO2 98%   BMI 27.98 kg/m²       Data Review:     Labs: Results:       Chemistry Recent Labs     02/27/25  0632 02/28/25  0327    140   K 3.6 2.8*    107   CO2 26 23   BUN 13 16   MG  --  1.8      CBC w/Diff Recent Labs     02/28/25  0327   WBC 10.4   RBC 4.32*   HGB 12.8*   HCT 39.6         Cardiac Enzymes No results found for: \"TROPHS\"   Coagulation Recent Labs     02/28/25  0327   APTT 112.1*       Lipid Panel Lab Results   Component Value Date/Time    CHOL 88 05/23/2023 08:20 AM    CHOL 83 12/15/2021 07:08 AM    HDL 23 05/23/2023 08:20 AM    LDL 48 05/23/2023 08:20 AM    VLDL 17 05/23/2023 08:20 AM      BNP No results found for: \"BNP\", \"BNPPOC\"    No results found for: \"BNP\"   Liver Enzymes Lab Results   Component Value Date    ALT 12 08/20/2024    AST 19 08/20/2024    ALKPHOS 175 (H) 08/20/2024    BILITOT 0.6 08/20/2024      Thyroid Studies Lab Results   Component Value Date/Time    TSH 2.08 04/08/2021 07:05 AM          Signed By: BREE Vasques - NP     February 28, 2025

## 2025-03-17 ENCOUNTER — OFFICE VISIT (OUTPATIENT)
Age: 65
End: 2025-03-17
Payer: MEDICARE

## 2025-03-17 VITALS
HEIGHT: 70 IN | OXYGEN SATURATION: 99 % | BODY MASS INDEX: 27.49 KG/M2 | WEIGHT: 192 LBS | DIASTOLIC BLOOD PRESSURE: 64 MMHG | SYSTOLIC BLOOD PRESSURE: 118 MMHG | HEART RATE: 104 BPM

## 2025-03-17 DIAGNOSIS — I48.0 PAROXYSMAL ATRIAL FIBRILLATION (HCC): Primary | ICD-10-CM

## 2025-03-17 DIAGNOSIS — I74.09 AORTOILIAC OCCLUSIVE DISEASE (HCC): ICD-10-CM

## 2025-03-17 DIAGNOSIS — I73.9 PAD (PERIPHERAL ARTERY DISEASE): Primary | ICD-10-CM

## 2025-03-17 DIAGNOSIS — I50.22 CHRONIC SYSTOLIC (CONGESTIVE) HEART FAILURE: ICD-10-CM

## 2025-03-17 DIAGNOSIS — R06.02 SHORTNESS OF BREATH: ICD-10-CM

## 2025-03-17 DIAGNOSIS — I10 ESSENTIAL (PRIMARY) HYPERTENSION: ICD-10-CM

## 2025-03-17 DIAGNOSIS — I25.10 ATHEROSCLEROSIS OF NATIVE CORONARY ARTERY WITHOUT ANGINA PECTORIS, UNSPECIFIED WHETHER NATIVE OR TRANSPLANTED HEART: ICD-10-CM

## 2025-03-17 DIAGNOSIS — E78.5 HYPERLIPIDEMIA, UNSPECIFIED HYPERLIPIDEMIA TYPE: ICD-10-CM

## 2025-03-17 PROCEDURE — 99214 OFFICE O/P EST MOD 30 MIN: CPT | Performed by: NURSE PRACTITIONER

## 2025-03-17 PROCEDURE — 3078F DIAST BP <80 MM HG: CPT | Performed by: NURSE PRACTITIONER

## 2025-03-17 PROCEDURE — 1160F RVW MEDS BY RX/DR IN RCRD: CPT | Performed by: NURSE PRACTITIONER

## 2025-03-17 PROCEDURE — 1123F ACP DISCUSS/DSCN MKR DOCD: CPT | Performed by: NURSE PRACTITIONER

## 2025-03-17 PROCEDURE — 3074F SYST BP LT 130 MM HG: CPT | Performed by: NURSE PRACTITIONER

## 2025-03-17 PROCEDURE — 93000 ELECTROCARDIOGRAM COMPLETE: CPT | Performed by: NURSE PRACTITIONER

## 2025-03-17 PROCEDURE — 1126F AMNT PAIN NOTED NONE PRSNT: CPT | Performed by: NURSE PRACTITIONER

## 2025-03-17 PROCEDURE — 1159F MED LIST DOCD IN RCRD: CPT | Performed by: NURSE PRACTITIONER

## 2025-03-17 RX ORDER — HYDROCODONE BITARTRATE AND ACETAMINOPHEN 5; 325 MG/1; MG/1
1 TABLET ORAL EVERY 4 HOURS PRN
Qty: 42 TABLET | Refills: 0 | Status: SHIPPED | OUTPATIENT
Start: 2025-03-17 | End: 2025-03-24

## 2025-03-17 ASSESSMENT — PATIENT HEALTH QUESTIONNAIRE - PHQ9
SUM OF ALL RESPONSES TO PHQ QUESTIONS 1-9: 0
SUM OF ALL RESPONSES TO PHQ QUESTIONS 1-9: 0
1. LITTLE INTEREST OR PLEASURE IN DOING THINGS: NOT AT ALL
SUM OF ALL RESPONSES TO PHQ QUESTIONS 1-9: 0
2. FEELING DOWN, DEPRESSED OR HOPELESS: NOT AT ALL
SUM OF ALL RESPONSES TO PHQ QUESTIONS 1-9: 0

## 2025-03-17 NOTE — PROGRESS NOTES
Jorden Artis presents today for   Chief Complaint   Patient presents with    Follow-up     1 month f/u     Follow-Up from Hospital     Hosp f/u 2/27-3/11        Jorden Artis preferred language for health care discussion is english/other.    Is someone accompanying this pt? no    Is the patient using any DME equipment during OV? yes    Depression Screening:  Depression: Not at risk (3/17/2025)    PHQ-2     PHQ-2 Score: 0        Learning Assessment:  Who is the primary learner? Patient    What is the preferred language for health care of the primary learner? ENGLISH    How does the primary learner prefer to learn new concepts? DEMONSTRATION    Answered By patient    Relationship to Learner SELF           Pt currently taking Anticoagulant therapy? Eliquis 5 mg BID     Pt currently taking Antiplatelet therapy ? Pletal 50 MG BID       Coordination of Care:  1. Have you been to the ER, urgent care clinic since your last visit? Hospitalized since your last visit? yes    2. Have you seen or consulted any other health care providers outside of the [facility] since your last visit? Include any pap smears or colon screening. no    
findings suggest low risk of myocardial ischemia.  EF calculation likely inaccurate due to gating issues due to rapid AFIB.     He was hospitalized from 1/21/25 through 1/26/25 at [facility] for severe sepsis related to Influenza and CAP, hypoxic respiratory failure, JESSY, and subsequent AFIB with RVR (felt to be exacerbated by infection). Cardiology was consulted. He was not given diltiazem due to his low EF but did receive a dose of digoxin. He received IV diuretics with good urine output. He was continued on metoprolol 50mg TID and Eliquis. He was also discharged home on Lasix 40mg BID.  Further adjustment of GDMT to be done as outpatient.  He had an Echo done on 1/21/25 and it showed an EF of <25%, global hypokinesis with dyskinetic septum, dilated and dysfunctional right ventricle, no hemodynamically significant valve pathology, RVSP 35-40 mmHg.     Jorden Artis is a 64 y.o. male presents today for :  Chief Complaint   Patient presents with    Follow-up     1 month f/u     Follow-Up from Hospital     Hosp f/u 2/27-3/11        PMH:  Past Medical History:   Diagnosis Date    Acute blood loss as cause of postoperative anemia 4/4/2017    Anticoagulated on Coumadin     Aortoiliac occlusive disease (HCC) 1/25/2017    Atherosclerosis of native artery of both lower extremities with intermittent claudication 1/25/2017    Benign hypertensive heart disease with systolic congestive heart failure, NYHA class 2 ([facility]) 1/26/2015    Carotid artery disease     Chronic anemia     Chronic systolic heart failure (HCC)     Chronic ulcer of right foot (HCC) 4/4/2017    Chronic ulcer of right heel (HCC) 8/8/2017    Coronary artery disease involving native coronary artery of native heart 3/15/2017    Successful stenting of Cx (Xience KARINE) and RCA (Xience KARINE) to 0% by Dr. Valenzuela on 3/15/17.    DDD (degenerative disc disease), lumbar 1/26/2015    Dyslipidemia     Erectile dysfunction 7/5/2016    Euthyroid sick syndrome 4/6/2017

## 2025-03-18 LAB
A/G RATIO: 1.2 RATIO (ref 1.1–2.6)
ALBUMIN: 4.2 G/DL (ref 3.5–5)
ALP BLD-CCNC: 233 U/L (ref 40–125)
ALT SERPL-CCNC: 63 U/L (ref 5–40)
ANION GAP SERPL CALCULATED.3IONS-SCNC: 12 MMOL/L (ref 3–15)
AST SERPL-CCNC: 65 U/L (ref 10–37)
BASOPHILS # BLD: 1 % (ref 0–2)
BASOPHILS ABSOLUTE: 0.1 K/UL (ref 0–0.2)
BILIRUB SERPL-MCNC: 0.2 MG/DL (ref 0.2–1.2)
BUN BLDV-MCNC: 16 MG/DL (ref 6–22)
CALCIUM SERPL-MCNC: 9.1 MG/DL (ref 8.4–10.5)
CHLORIDE BLD-SCNC: 102 MMOL/L (ref 98–110)
CHOLESTEROL, TOTAL: 168 MG/DL (ref 110–200)
CHOLESTEROL/HDL RATIO: 6.7 (ref 0–5)
CO2: 25 MMOL/L (ref 20–32)
CREAT SERPL-MCNC: 1 MG/DL (ref 0.8–1.6)
EOSINOPHIL # BLD: 2 % (ref 0–6)
EOSINOPHILS ABSOLUTE: 0.2 K/UL (ref 0–0.5)
GFR, ESTIMATED: >60 ML/MIN/1.73 SQ.M.
GLOBULIN: 3.5 G/DL (ref 2–4)
GLUCOSE: 115 MG/DL (ref 70–99)
HCT VFR BLD CALC: 42.8 % (ref 37.8–52.2)
HDLC SERPL-MCNC: 25 MG/DL
HEMOGLOBIN: 14 G/DL (ref 12.6–17.1)
LDL CHOLESTEROL: 113 MG/DL (ref 50–99)
LDL/HDL RATIO: 4.5
LYMPHOCYTES # BLD: 21 % (ref 20–45)
LYMPHOCYTES ABSOLUTE: 2.1 K/UL (ref 1–4.8)
MCH RBC QN AUTO: 30 PG (ref 26–34)
MCHC RBC AUTO-ENTMCNC: 33 G/DL (ref 31–36)
MCV RBC AUTO: 91 FL (ref 80–95)
MONOCYTES ABSOLUTE: 1.3 K/UL (ref 0.1–1)
MONOCYTES: 13 % (ref 3–12)
NEUTROPHILS ABSOLUTE: 6.3 K/UL (ref 1.8–7.7)
NEUTROPHILS SEGMENTED: 63 % (ref 40–75)
NON-HDL CHOLESTEROL: 143 MG/DL
PDW BLD-RTO: 15.5 % (ref 10–15.5)
PLATELET # BLD: 482 K/UL (ref 140–440)
PMV BLD AUTO: 9.1 FL (ref 9–13)
POTASSIUM SERPL-SCNC: 4.5 MMOL/L (ref 3.5–5.5)
RBC # BLD: 4.73 M/UL (ref 3.8–5.8)
SODIUM BLD-SCNC: 139 MMOL/L (ref 133–145)
TOTAL PROTEIN: 7.7 G/DL (ref 6.2–8.1)
TRIGL SERPL-MCNC: 148 MG/DL (ref 40–149)
VITAMIN D 25-HYDROXY: 27 NG/ML (ref 32–100)
VLDLC SERPL CALC-MCNC: 30 MG/DL (ref 8–30)
WBC # BLD: 10 K/UL (ref 4–11)

## 2025-03-26 ENCOUNTER — OFFICE VISIT (OUTPATIENT)
Facility: CLINIC | Age: 65
End: 2025-03-26
Payer: MEDICARE

## 2025-03-26 VITALS
WEIGHT: 179 LBS | BODY MASS INDEX: 25.62 KG/M2 | HEART RATE: 107 BPM | SYSTOLIC BLOOD PRESSURE: 122 MMHG | TEMPERATURE: 99.3 F | OXYGEN SATURATION: 98 % | RESPIRATION RATE: 16 BRPM | DIASTOLIC BLOOD PRESSURE: 70 MMHG | HEIGHT: 70 IN

## 2025-03-26 DIAGNOSIS — E55.9 VITAMIN D INSUFFICIENCY: ICD-10-CM

## 2025-03-26 DIAGNOSIS — I70.213 ATHEROSCLEROSIS OF NATIVE ARTERY OF BOTH LOWER EXTREMITIES WITH INTERMITTENT CLAUDICATION: ICD-10-CM

## 2025-03-26 DIAGNOSIS — E78.5 DYSLIPIDEMIA: ICD-10-CM

## 2025-03-26 DIAGNOSIS — R79.89 ELEVATED LIVER FUNCTION TESTS: ICD-10-CM

## 2025-03-26 DIAGNOSIS — I73.9 PAD (PERIPHERAL ARTERY DISEASE): Primary | ICD-10-CM

## 2025-03-26 DIAGNOSIS — I48.91 ATRIAL FIBRILLATION WITH RAPID VENTRICULAR RESPONSE (HCC): ICD-10-CM

## 2025-03-26 DIAGNOSIS — I48.0 PAROXYSMAL A-FIB (HCC): ICD-10-CM

## 2025-03-26 DIAGNOSIS — Z89.612 STATUS POST ABOVE-KNEE AMPUTATION OF LEFT LOWER EXTREMITY: ICD-10-CM

## 2025-03-26 DIAGNOSIS — I73.9 PAD (PERIPHERAL ARTERY DISEASE): ICD-10-CM

## 2025-03-26 PROCEDURE — 1159F MED LIST DOCD IN RCRD: CPT | Performed by: FAMILY MEDICINE

## 2025-03-26 PROCEDURE — 3078F DIAST BP <80 MM HG: CPT | Performed by: FAMILY MEDICINE

## 2025-03-26 PROCEDURE — 1126F AMNT PAIN NOTED NONE PRSNT: CPT | Performed by: FAMILY MEDICINE

## 2025-03-26 PROCEDURE — 3074F SYST BP LT 130 MM HG: CPT | Performed by: FAMILY MEDICINE

## 2025-03-26 PROCEDURE — 99214 OFFICE O/P EST MOD 30 MIN: CPT | Performed by: FAMILY MEDICINE

## 2025-03-26 PROCEDURE — 1160F RVW MEDS BY RX/DR IN RCRD: CPT | Performed by: FAMILY MEDICINE

## 2025-03-26 PROCEDURE — 1123F ACP DISCUSS/DSCN MKR DOCD: CPT | Performed by: FAMILY MEDICINE

## 2025-03-26 RX ORDER — DILTIAZEM HYDROCHLORIDE EXTENDED-RELEASE TABLETS 180 MG/1
1 TABLET, EXTENDED RELEASE ORAL EVERY MORNING
COMMUNITY
Start: 2025-03-17 | End: 2025-03-26

## 2025-03-26 RX ORDER — ROSUVASTATIN CALCIUM 40 MG/1
40 TABLET, COATED ORAL DAILY
Qty: 90 TABLET | Refills: 0 | Status: SHIPPED | OUTPATIENT
Start: 2025-03-26

## 2025-03-26 RX ORDER — ATORVASTATIN CALCIUM 80 MG/1
80 TABLET, FILM COATED ORAL DAILY
Qty: 90 TABLET | Refills: 3 | Status: SHIPPED | OUTPATIENT
Start: 2025-03-26 | End: 2025-03-26

## 2025-03-26 NOTE — PROGRESS NOTES
\"Have you been to the ER, urgent care clinic since your last visit?  Hospitalized since your last visit?\"    YES - When: approximately 1 months ago.  Where and Why: flu for mmc .    “Have you seen or consulted any other health care providers outside our system since your last visit?”    NO      “Have you had a colorectal cancer screening such as a colonoscopy/FIT/Cologuard?    NO    Date of last Colonoscopy: 7/23/2015  No cologuard on file  No FIT/FOBT on file   No flexible sigmoidoscopy on file

## 2025-03-26 NOTE — PROGRESS NOTES
Jorden Artis, 65y.o.,  male    SUBJECTIVE  Ff-up      significant PAD - s/p AKA Left leg and complicated revasculariation (2017).R 4-5th digit amputation (2022).occluded and of right femoral-popliteal bypass graft underwent subsequent thrombectomy (5/6/2024) and most recently acute occlusion of right cadaver saphenous artery femoral bypass graft status post balloon angioplasty and stent placement with thrombectomy (3/2025) he reports to be doing well.  ASA was changed to Plavix, continued pletal     Afib-transient RVR.on  eliquis and metoprolol, following.dr. mai. EF 50-55% during admission in feb/march 2025    H/o CAD s/p angioplasty- continued on asa, BB.  Stress test 11/2020 low risk study. following Dr. mai      ROS:  See HPI, all others negative            Current Outpatient Medications:     vitamin D (CHOLECALCIFEROL) 25 MCG (1000 UT) TABS tablet, Take 1 tablet by mouth daily, Disp: 90 tablet, Rfl: 3    atorvastatin (LIPITOR) 80 MG tablet, Take 1 tablet by mouth daily, Disp: 90 tablet, Rfl: 3    clopidogrel (PLAVIX) 75 MG tablet, Take 1 tablet by mouth daily, Disp: 30 tablet, Rfl: 3    metoprolol tartrate (LOPRESSOR) 50 MG tablet, Take 1 tablet by mouth in the morning, at noon, and at bedtime, Disp: 270 tablet, Rfl: 3    furosemide (LASIX) 40 MG tablet, Take 1 tablet by mouth 2 times daily, Disp: 180 tablet, Rfl: 3    pregabalin (LYRICA) 75 MG capsule, Take 1 capsule by mouth 3 times daily., Disp: , Rfl:     Multiple Vitamin (ONE-A-DAY 55 PLUS PO), Take 1 tablet by mouth daily, Disp: , Rfl:     apixaban (ELIQUIS) 5 MG TABS tablet, Take 1 tablet by mouth 2 times daily, Disp: , Rfl:     cilostazol (PLETAL) 50 MG tablet, Take 1 tablet by mouth 2 times daily, Disp: , Rfl:     dilTIAZem HCl  MG TB24, Take 1 tablet by mouth every morning (Patient not taking: Reported on 3/26/2025), Disp: , Rfl:       Allergies   Allergen Reactions    Morphine Other (comments)     Patient gets confused with

## 2025-03-28 ENCOUNTER — TELEPHONE (OUTPATIENT)
Facility: CLINIC | Age: 65
End: 2025-03-28

## 2025-03-28 NOTE — TELEPHONE ENCOUNTER
Incoming call received from Mr. Artis said that he was suppose to get Atorvastatin and instead of him getting this he got rosuvastatin. He wants to know is that the same medication? Patient is requesting for the nurse to call back..

## 2025-03-28 NOTE — TELEPHONE ENCOUNTER
Patient identified with 2 identifiers (name and ).  Spoke with patient and he states his AVS states to start taking Atrorvastin   But when he picked up from the pharmacy he got Rosuvastatin.   Reviewed DR. Park office notes from OV 2025  Advised patient that per Dr. Park's notes:  Dyslipidemia  LDL goal <70  Not at goal, says not currently taking lipitor due to transaminitis , start crestor 40 mg qhs instead  Lipid panel/cmp prior to next visit    Patient verbalizes understanding  and will start the Rosuvastatin tonight.

## 2025-04-02 ENCOUNTER — TELEPHONE (OUTPATIENT)
Facility: CLINIC | Age: 65
End: 2025-04-02

## 2025-04-02 NOTE — TELEPHONE ENCOUNTER
Patient called and states he knocked the skin off his right pointer finger knuckle last night . Patient applied bandage and pressure last night. Patient states knuckle is still bleeding today.   Patient states that the bleeding all started once he started the Rosuvastatin x 1 week ago   Any small knick will bleed and bleed. Having difficulty stopping the bleeding.

## 2025-04-02 NOTE — TELEPHONE ENCOUNTER
Patient identified with 2 identifiers (name and ).  Patient aware of Noted, bleeding is not a known side effect of crestor   He is on eliquis- for afib to prevent strokes   And plavix with his PAD and stents to prevent further reocclusion   These medications are more of the concern for bleeding, and likely contributing to concern   Apply pressure until bleeding stops.    if bleeding is significant and profuse, recommend ED evaluati   Patient verbalizes understanding

## 2025-04-09 ENCOUNTER — HOSPITAL ENCOUNTER (OUTPATIENT)
Facility: HOSPITAL | Age: 65
Discharge: HOME | End: 2025-04-12
Attending: FAMILY MEDICINE
Payer: MEDICARE

## 2025-04-09 DIAGNOSIS — R79.89 ELEVATED LIVER FUNCTION TESTS: ICD-10-CM

## 2025-04-09 PROCEDURE — 76705 ECHO EXAM OF ABDOMEN: CPT

## 2025-04-15 ENCOUNTER — RESULTS FOLLOW-UP (OUTPATIENT)
Facility: CLINIC | Age: 65
End: 2025-04-15

## 2025-06-11 ENCOUNTER — OFFICE VISIT (OUTPATIENT)
Age: 65
End: 2025-06-11
Payer: MEDICARE

## 2025-06-11 VITALS
BODY MASS INDEX: 27.92 KG/M2 | SYSTOLIC BLOOD PRESSURE: 128 MMHG | WEIGHT: 195 LBS | DIASTOLIC BLOOD PRESSURE: 78 MMHG | OXYGEN SATURATION: 98 % | HEIGHT: 70 IN | HEART RATE: 124 BPM

## 2025-06-11 DIAGNOSIS — I48.0 PAROXYSMAL ATRIAL FIBRILLATION (HCC): ICD-10-CM

## 2025-06-11 DIAGNOSIS — I25.5 ISCHEMIC CARDIOMYOPATHY: ICD-10-CM

## 2025-06-11 DIAGNOSIS — I25.10 ATHEROSCLEROSIS OF NATIVE CORONARY ARTERY WITHOUT ANGINA PECTORIS, UNSPECIFIED WHETHER NATIVE OR TRANSPLANTED HEART: Primary | ICD-10-CM

## 2025-06-11 DIAGNOSIS — E78.5 HYPERLIPIDEMIA, UNSPECIFIED HYPERLIPIDEMIA TYPE: ICD-10-CM

## 2025-06-11 DIAGNOSIS — I25.10 CORONARY ARTERY DISEASE, UNSPECIFIED VESSEL OR LESION TYPE, UNSPECIFIED WHETHER ANGINA PRESENT, UNSPECIFIED WHETHER NATIVE OR TRANSPLANTED HEART: ICD-10-CM

## 2025-06-11 PROCEDURE — 93000 ELECTROCARDIOGRAM COMPLETE: CPT | Performed by: INTERNAL MEDICINE

## 2025-06-11 PROCEDURE — 99214 OFFICE O/P EST MOD 30 MIN: CPT | Performed by: INTERNAL MEDICINE

## 2025-06-11 PROCEDURE — 1123F ACP DISCUSS/DSCN MKR DOCD: CPT | Performed by: INTERNAL MEDICINE

## 2025-06-11 PROCEDURE — 1126F AMNT PAIN NOTED NONE PRSNT: CPT | Performed by: INTERNAL MEDICINE

## 2025-06-11 PROCEDURE — 1159F MED LIST DOCD IN RCRD: CPT | Performed by: INTERNAL MEDICINE

## 2025-06-11 PROCEDURE — 3074F SYST BP LT 130 MM HG: CPT | Performed by: INTERNAL MEDICINE

## 2025-06-11 PROCEDURE — 1160F RVW MEDS BY RX/DR IN RCRD: CPT | Performed by: INTERNAL MEDICINE

## 2025-06-11 PROCEDURE — 3078F DIAST BP <80 MM HG: CPT | Performed by: INTERNAL MEDICINE

## 2025-06-11 ASSESSMENT — PATIENT HEALTH QUESTIONNAIRE - PHQ9
SUM OF ALL RESPONSES TO PHQ QUESTIONS 1-9: 0
1. LITTLE INTEREST OR PLEASURE IN DOING THINGS: NOT AT ALL
2. FEELING DOWN, DEPRESSED OR HOPELESS: NOT AT ALL
SUM OF ALL RESPONSES TO PHQ QUESTIONS 1-9: 0

## 2025-06-11 NOTE — PROGRESS NOTES
Jorden Artis presents today for   Chief Complaint   Patient presents with    Follow-up     6 month       Jorden Artis preferred language for health care discussion is english/other.    Is someone accompanying this pt? no    Is the patient using any DME equipment during OV? wheelchair    Depression Screening:  Depression: Not at risk (6/11/2025)    PHQ-2     PHQ-2 Score: 0        Learning Assessment:  Who is the primary learner? Patient    What is the preferred language for health care of the primary learner? ENGLISH    How does the primary learner prefer to learn new concepts? DEMONSTRATION    Answered By patient    Relationship to Learner SELF           Pt currently taking Anticoagulant therapy? Eliquis 5 mg bid    Pt currently taking Antiplatelet therapy ? Plavix 75 mg daily      Coordination of Care:  1. Have you been to the ER, urgent care clinic since your last visit? Hospitalized since your last visit? no    2. Have you seen or consulted any other health care providers outside of the [facility] since your last visit? Include any pap smears or colon screening. no

## 2025-06-11 NOTE — PROGRESS NOTES
HISTORY OF PRESENT ILLNESS  Jorden Artis  65 y.o. male     Chief Complaint   Patient presents with    Follow-up     6 month       ASSESSMENT and PLAN    The primary encounter diagnosis was Atherosclerosis of native coronary artery without angina pectoris, unspecified whether native or transplanted heart. Diagnoses of Coronary artery disease, unspecified vessel or lesion type, unspecified whether angina present, unspecified whether native or transplanted heart, Ischemic cardiomyopathy, Paroxysmal atrial fibrillation (HCC), and Hyperlipidemia, unspecified hyperlipidemia type were also pertinent to this visit.    Mr. Jorden Artis, previous Dr. Rushing patient, has known history of PVD, as well as CAD.  His coronary angiography back in 2017 showed EF 30% with mild left main and LAD disease.  Circumflex had proximal 70% lesion as well as trifurcation healed ruptured plaque with residual 90% stenosis.  RCA had diffuse 50% with focal mid 90% disease.  Subsequently, he had his circumflex and RCA stented to 0%.  His nuclear scan in November 2020 showed low risk finding with EF 51%.    He has history of left leg AKA (2017) and right leg arterial bypass surgery.  He has CAF, as well as dyslipidemia.  He was hospitalized March 2021 at [facility] for right leg arterial thrombectomy.  He was noted to be in atrial fibrillation.  In February 2022, he was admitted with right femoral artery/popliteal artery bypass graft occlusion.  During that hospitalization, echocardiogram revealed reduced EF.  In 2022, he retired from .  His nuclear scan done in June 2023 showed low risk finding with normal perfusion.  His echocardiogram done in May 2024 while hospitalized for PAD showed EF 50-55% with RVSP 27 mmHg.    He was hospitalized at [facility] in May 2024 for thrombectomy and right leg bypass graft revision.    Medication regimen reviewed and current cardiac regimen will be continued.  All new testing

## 2025-06-18 LAB
A/G RATIO: 1.2 RATIO (ref 1.1–2.6)
ALBUMIN: 3.9 G/DL (ref 3.5–5)
ALP BLD-CCNC: 206 U/L (ref 40–125)
ALT SERPL-CCNC: 16 U/L (ref 5–40)
ANION GAP SERPL CALCULATED.3IONS-SCNC: 15 MMOL/L (ref 3–15)
AST SERPL-CCNC: 26 U/L (ref 10–37)
BILIRUB SERPL-MCNC: 0.4 MG/DL (ref 0.2–1.2)
BUN BLDV-MCNC: 10 MG/DL (ref 6–22)
CALCIUM SERPL-MCNC: 9 MG/DL (ref 8.4–10.5)
CHLORIDE BLD-SCNC: 102 MMOL/L (ref 98–110)
CHOLESTEROL, TOTAL: 68 MG/DL (ref 110–200)
CHOLESTEROL/HDL RATIO: 4 (ref 0–5)
CO2: 25 MMOL/L (ref 20–32)
CREAT SERPL-MCNC: 0.9 MG/DL (ref 0.8–1.6)
GFR, ESTIMATED: >90 ML/MIN/1.73 SQ.M.
GLOBULIN: 3.3 G/DL (ref 2–4)
GLUCOSE: 107 MG/DL (ref 70–99)
HDLC SERPL-MCNC: 17 MG/DL
LDL CHOLESTEROL: 30 MG/DL (ref 50–99)
LDL/HDL RATIO: 1.8
NON-HDL CHOLESTEROL: 51 MG/DL
POTASSIUM SERPL-SCNC: 3.3 MMOL/L (ref 3.5–5.5)
SODIUM BLD-SCNC: 142 MMOL/L (ref 133–145)
TOTAL PROTEIN: 7.2 G/DL (ref 6.2–8.1)
TRIGL SERPL-MCNC: 102 MG/DL (ref 40–149)
VITAMIN D 25-HYDROXY: 39.1 NG/ML (ref 32–100)
VLDLC SERPL CALC-MCNC: 20 MG/DL (ref 8–30)

## 2025-06-19 DIAGNOSIS — E87.6 HYPOKALEMIA: ICD-10-CM

## 2025-06-19 DIAGNOSIS — E87.6 HYPOKALEMIA: Primary | ICD-10-CM

## 2025-06-19 RX ORDER — POTASSIUM CHLORIDE 1500 MG/1
20 TABLET, EXTENDED RELEASE ORAL DAILY
Qty: 90 TABLET | Refills: 0 | Status: SHIPPED | OUTPATIENT
Start: 2025-06-19

## 2025-06-19 NOTE — TELEPHONE ENCOUNTER
This patient contacted office for the following prescriptions to be filled:    Medication requested :   rosuvastatin (CRESTOR) 40 MG tablet QTY 90  PCP:  Xavier   Pharmacy or Print: [facility]   Mail order or Local pharmacy  Ashish Goodrich   Last office visit 3/26/2025  Next office visit 6/26/2025

## 2025-06-20 ENCOUNTER — RESULTS FOLLOW-UP (OUTPATIENT)
Facility: CLINIC | Age: 65
End: 2025-06-20

## 2025-06-20 NOTE — TELEPHONE ENCOUNTER
----- Message from Dr. Neeta Park MD sent at 6/19/2025  6:31 AM EDT -----  Regarding: Hypokalemia  Low K level, likely due to furosemide  Will add kcl 20 meqs daily. Recheck BMP next week  Lipid levels satisfactory, LFTs no longer elevated, cont statin.  Will discuss in detail on next scheduled visit   Pls notify pt  ----- Message -----  From: Buck Trammell Incoming Amb Reference Lab Results [facility]  Sent: 6/18/2025  12:47 PM EDT  To: Neeta Park MD

## 2025-06-20 NOTE — TELEPHONE ENCOUNTER
Patient aware of Low K level, likely due to furosemide   Will add kcl 20 meqs daily. Recheck BMP next week   Lipid levels satisfactory, LFTs no longer elevated, cont statin.   Will discuss in detail on next scheduled visit   Patient verbalizes understanding

## 2025-06-23 ENCOUNTER — TELEPHONE (OUTPATIENT)
Facility: CLINIC | Age: 65
End: 2025-06-23

## 2025-06-23 RX ORDER — ROSUVASTATIN CALCIUM 40 MG/1
40 TABLET, COATED ORAL DAILY
Qty: 90 TABLET | Refills: 3 | Status: SHIPPED | OUTPATIENT
Start: 2025-06-23

## 2025-06-23 NOTE — PROGRESS NOTES
Have you been to the ER, urgent care clinic since your last visit?  Hospitalized since your last visit?   {YES/NO:[id number]}    Have you seen or consulted any other health care providers outside our system since your last visit?   {YES/NO:[id number]}      “Have you had a colorectal cancer screening such as a colonoscopy/FIT/Cologuard?    {Yes/No:[id number]}    Date of last Colonoscopy: 7/23/2015  No cologuard on file  No FIT/FOBT on file   No flexible sigmoidoscopy on file       {Click Here for Release of Records Request :[id number]}

## 2025-06-25 NOTE — TELEPHONE ENCOUNTER
Patient identified with 2 identifiers (name and ).  Spoke with patient regarding his refill request for his rosuvastatin.   Patient aware it was approved on 2025  Patient aware that his vit D will be discussed at his appt on 2025

## 2025-06-26 ENCOUNTER — OFFICE VISIT (OUTPATIENT)
Facility: CLINIC | Age: 65
End: 2025-06-26
Payer: MEDICARE

## 2025-06-26 VITALS
HEART RATE: 99 BPM | DIASTOLIC BLOOD PRESSURE: 78 MMHG | RESPIRATION RATE: 16 BRPM | WEIGHT: 197.8 LBS | HEIGHT: 70 IN | TEMPERATURE: 97.1 F | SYSTOLIC BLOOD PRESSURE: 110 MMHG | OXYGEN SATURATION: 97 % | BODY MASS INDEX: 28.32 KG/M2

## 2025-06-26 DIAGNOSIS — I25.83 CORONARY ARTERY DISEASE DUE TO LIPID RICH PLAQUE: ICD-10-CM

## 2025-06-26 DIAGNOSIS — E78.5 DYSLIPIDEMIA: ICD-10-CM

## 2025-06-26 DIAGNOSIS — E87.6 HYPOKALEMIA: ICD-10-CM

## 2025-06-26 DIAGNOSIS — I73.9 PAD (PERIPHERAL ARTERY DISEASE): ICD-10-CM

## 2025-06-26 DIAGNOSIS — I25.10 CORONARY ARTERY DISEASE DUE TO LIPID RICH PLAQUE: ICD-10-CM

## 2025-06-26 DIAGNOSIS — I10 PRIMARY HYPERTENSION: Primary | ICD-10-CM

## 2025-06-26 DIAGNOSIS — E55.9 VITAMIN D DEFICIENCY: ICD-10-CM

## 2025-06-26 DIAGNOSIS — I48.91 ATRIAL FIBRILLATION WITH RAPID VENTRICULAR RESPONSE (HCC): ICD-10-CM

## 2025-06-26 DIAGNOSIS — I25.5 ISCHEMIC CARDIOMYOPATHY: ICD-10-CM

## 2025-06-26 DIAGNOSIS — I50.22 CHRONIC SYSTOLIC HEART FAILURE (HCC): ICD-10-CM

## 2025-06-26 DIAGNOSIS — I10 PRIMARY HYPERTENSION: ICD-10-CM

## 2025-06-26 PROCEDURE — 1123F ACP DISCUSS/DSCN MKR DOCD: CPT | Performed by: FAMILY MEDICINE

## 2025-06-26 PROCEDURE — 1160F RVW MEDS BY RX/DR IN RCRD: CPT | Performed by: FAMILY MEDICINE

## 2025-06-26 PROCEDURE — 99214 OFFICE O/P EST MOD 30 MIN: CPT | Performed by: FAMILY MEDICINE

## 2025-06-26 PROCEDURE — 1159F MED LIST DOCD IN RCRD: CPT | Performed by: FAMILY MEDICINE

## 2025-06-26 PROCEDURE — 1126F AMNT PAIN NOTED NONE PRSNT: CPT | Performed by: FAMILY MEDICINE

## 2025-06-26 PROCEDURE — 3074F SYST BP LT 130 MM HG: CPT | Performed by: FAMILY MEDICINE

## 2025-06-26 PROCEDURE — 3078F DIAST BP <80 MM HG: CPT | Performed by: FAMILY MEDICINE

## 2025-06-26 NOTE — PROGRESS NOTES
Have you been to the ER, urgent care clinic since your last visit?  Hospitalized since your last visit?   NO    Have you seen or consulted any other health care providers outside our system since your last visit?   NO      “Have you had a colorectal cancer screening such as a colonoscopy/FIT/Cologuard?    NO    Date of last Colonoscopy: 7/23/2015  No cologuard on file  No FIT/FOBT on file   No flexible sigmoidoscopy on file            Chief Complaint   Patient presents with    Cholesterol Problem    Atrial Fibrillation    PAD

## 2025-06-26 NOTE — PROGRESS NOTES
SUBJECTIVE  Ff-up      significant PAD - s/p AKA Left leg and complicated revasculariation (2017).R 4-5th digit amputation (2022).occluded and of right femoral-popliteal bypass graft underwent subsequent thrombectomy (5/6/2024) and most recently acute occlusion of right cadaver saphenous artery femoral bypass graft status post balloon angioplasty and stent placement with thrombectomy (3/2025) he reports to be doing well.  ASA was changed to Plavix, continued pletal. Says [facility] surgery planning on stump wound procedure     Afib-transient RVR.on  eliquis and metoprolol, following.dr. mai. EF 50-55% during admission in feb/march 2025    H/o CAD s/p angioplasty- continued on asa, BB, furosemide.  Stress test 11/2020 low risk study. following Dr. mai    HL- tolerating crestor, LFTs back to normal range. RUQ US no acute findings, suggestive of fatty liver.     ROS:  See HPI, all others negative            Current Outpatient Medications:     vitamin D (CHOLECALCIFEROL) 25 MCG (1000 UT) TABS tablet, Take 1 tablet by mouth daily, Disp: 90 tablet, Rfl: 3    rosuvastatin (CRESTOR) 40 MG tablet, Take 1 tablet by mouth daily, Disp: 90 tablet, Rfl: 3    potassium chloride (KLOR-CON M) 20 MEQ extended release tablet, Take 1 tablet by mouth daily, Disp: 90 tablet, Rfl: 0    clopidogrel (PLAVIX) 75 MG tablet, Take 1 tablet by mouth daily, Disp: 30 tablet, Rfl: 3    metoprolol tartrate (LOPRESSOR) 50 MG tablet, Take 1 tablet by mouth in the morning, at noon, and at bedtime, Disp: 270 tablet, Rfl: 3    furosemide (LASIX) 40 MG tablet, Take 1 tablet by mouth 2 times daily, Disp: 180 tablet, Rfl: 3    pregabalin (LYRICA) 75 MG capsule, Take 1 capsule by mouth 3 times daily., Disp: , Rfl:     Multiple Vitamin (ONE-A-DAY 55 PLUS PO), Take 1 tablet by mouth daily, Disp: , Rfl:     apixaban (ELIQUIS) 5 MG TABS tablet, Take 1 tablet by mouth 2 times daily, Disp: , Rfl:     cilostazol (PLETAL) 50 MG tablet, Take 1 tablet by mouth 2

## 2025-07-20 NOTE — PROGRESS NOTES
Verbal order and read back per Laurita Cárdenas NP  The INR is stable and therapeutic.  Continue same dose of coumadin and recheck in 1 week  Continue Coumadin 2.5 mg daily  NKECHI Banks informed of instructions, read back and verbalized understanding Chandrakant Velasco LPN [id number]

## 2025-07-25 NOTE — PERIOP NOTE
Instructions for your surgery at [facility]      Today's Date:  7/25/2025      Patient's Name:  Jorden Artis           Surgery Date:  7/28/2025              Please enter the main entrance of the hospital and check-in at the front security desk located in the lobby. They will direct you to the area to report for your surgery.     Do NOT eat or drink anything, including candy, gum, or ice chips after midnight prior to your surgery, unless you have specific instructions from your surgeon or anesthesia provider to do so.  Brush your teeth before coming to the hospital. You may swish with water, but do not swallow.  No smoking/Vaping/E-Cigarettes 24 hours prior to the day of surgery.  No alcohol 24 hours prior to the day of surgery.  No recreational drugs for one week prior to the day of surgery.  Bring Photo ID, Insurance information, and Co-pay if required on day of surgery.  Bring in pertinent legal documents, such as, Medical Power of , DNR, Advance Directive, etc.  Leave all valuables, including money/purse, weapons at home.  Remove all jewelry, including ALL body piercings, nail polish, acrylic nails, and makeup (including mascara); no lotions, powders, deodorant, or perfume/cologne/after shave on the skin.  Follow instruction for Hibiclens washes and CHG wipes from surgeon's office.   Glasses and dentures may be worn to the hospital. They must be removed prior to surgery. Please bring case/container for glasses or dentures.   Contact lenses should not be worn on day of surgery.   Call your doctor's office if symptoms of a cold or illness develop within 24-48 hours prior to your surgery.  Call your doctor's office if you have any questions concerning insurance or co-pays.  15. AN ADULT (relative or friend 18 years or older) MUST DRIVE YOU HOME AFTER YOUR SURGERY.  16. Please make arrangements for a responsible adult (18 years or older) to be with you for 24 hours after your

## 2025-07-28 ENCOUNTER — ANESTHESIA (OUTPATIENT)
Dept: CARDIOTHORACIC SURGERY | Facility: HOSPITAL | Age: 65
End: 2025-07-28
Payer: MEDICARE

## 2025-07-28 ENCOUNTER — ANESTHESIA EVENT (OUTPATIENT)
Dept: CARDIOTHORACIC SURGERY | Facility: HOSPITAL | Age: 65
End: 2025-07-28
Payer: MEDICARE

## 2025-07-28 ENCOUNTER — HOSPITAL ENCOUNTER (OUTPATIENT)
Facility: HOSPITAL | Age: 65
Setting detail: OUTPATIENT SURGERY
Discharge: HOME | End: 2025-07-28
Attending: SURGERY | Admitting: SURGERY
Payer: MEDICARE

## 2025-07-28 VITALS
TEMPERATURE: 98.2 F | HEART RATE: 88 BPM | OXYGEN SATURATION: 95 % | WEIGHT: 192 LBS | SYSTOLIC BLOOD PRESSURE: 103 MMHG | BODY MASS INDEX: 27.49 KG/M2 | DIASTOLIC BLOOD PRESSURE: 62 MMHG | RESPIRATION RATE: 18 BRPM | HEIGHT: 70 IN

## 2025-07-28 DIAGNOSIS — Z89.612 STATUS POST ABOVE-KNEE AMPUTATION OF LEFT LOWER EXTREMITY (HCC): Primary | ICD-10-CM

## 2025-07-28 PROCEDURE — 88304 TISSUE EXAM BY PATHOLOGIST: CPT

## 2025-07-28 PROCEDURE — 3600000012 HC SURGERY LEVEL 2 ADDTL 15MIN: Performed by: SURGERY

## 2025-07-28 PROCEDURE — 88305 TISSUE EXAM BY PATHOLOGIST: CPT

## 2025-07-28 PROCEDURE — 6370000000 HC RX 637 (ALT 250 FOR IP): Performed by: ANESTHESIOLOGY

## 2025-07-28 PROCEDURE — 7100000010 HC PHASE II RECOVERY - FIRST 15 MIN: Performed by: SURGERY

## 2025-07-28 PROCEDURE — 3700000001 HC ADD 15 MINUTES (ANESTHESIA): Performed by: SURGERY

## 2025-07-28 PROCEDURE — 3600000002 HC SURGERY LEVEL 2 BASE: Performed by: SURGERY

## 2025-07-28 PROCEDURE — 2709999900 HC NON-CHARGEABLE SUPPLY: Performed by: SURGERY

## 2025-07-28 PROCEDURE — 7100000011 HC PHASE II RECOVERY - ADDTL 15 MIN: Performed by: SURGERY

## 2025-07-28 PROCEDURE — 6360000002 HC RX W HCPCS: Performed by: SURGERY

## 2025-07-28 PROCEDURE — 3700000000 HC ANESTHESIA ATTENDED CARE: Performed by: SURGERY

## 2025-07-28 PROCEDURE — 7100000001 HC PACU RECOVERY - ADDTL 15 MIN: Performed by: SURGERY

## 2025-07-28 PROCEDURE — 2500000003 HC RX 250 WO HCPCS: Performed by: SURGERY

## 2025-07-28 PROCEDURE — 6360000002 HC RX W HCPCS: Performed by: NURSE ANESTHETIST, CERTIFIED REGISTERED

## 2025-07-28 PROCEDURE — 7100000000 HC PACU RECOVERY - FIRST 15 MIN: Performed by: SURGERY

## 2025-07-28 PROCEDURE — 2580000003 HC RX 258: Performed by: SURGERY

## 2025-07-28 RX ORDER — ACETAMINOPHEN 325 MG/1
650 TABLET ORAL
Status: DISCONTINUED | OUTPATIENT
Start: 2025-07-28 | End: 2025-07-28 | Stop reason: HOSPADM

## 2025-07-28 RX ORDER — SODIUM CHLORIDE 0.9 % (FLUSH) 0.9 %
5-40 SYRINGE (ML) INJECTION PRN
Status: DISCONTINUED | OUTPATIENT
Start: 2025-07-28 | End: 2025-07-28 | Stop reason: HOSPADM

## 2025-07-28 RX ORDER — METOPROLOL TARTRATE 50 MG
50 TABLET ORAL ONCE
Status: COMPLETED | OUTPATIENT
Start: 2025-07-28 | End: 2025-07-28

## 2025-07-28 RX ORDER — FAMOTIDINE 20 MG/1
20 TABLET, FILM COATED ORAL 2 TIMES DAILY
Status: DISCONTINUED | OUTPATIENT
Start: 2025-07-28 | End: 2025-07-28 | Stop reason: HOSPADM

## 2025-07-28 RX ORDER — SODIUM CHLORIDE 9 MG/ML
INJECTION, SOLUTION INTRAVENOUS PRN
Status: DISCONTINUED | OUTPATIENT
Start: 2025-07-28 | End: 2025-07-28 | Stop reason: HOSPADM

## 2025-07-28 RX ORDER — CEFAZOLIN SODIUM 1 G/3ML
INJECTION, POWDER, FOR SOLUTION INTRAMUSCULAR; INTRAVENOUS
Status: DISCONTINUED | OUTPATIENT
Start: 2025-07-28 | End: 2025-07-28 | Stop reason: SDUPTHER

## 2025-07-28 RX ORDER — SODIUM CHLORIDE, SODIUM LACTATE, POTASSIUM CHLORIDE, CALCIUM CHLORIDE 600; 310; 30; 20 MG/100ML; MG/100ML; MG/100ML; MG/100ML
INJECTION, SOLUTION INTRAVENOUS CONTINUOUS
Status: DISCONTINUED | OUTPATIENT
Start: 2025-07-28 | End: 2025-07-28 | Stop reason: HOSPADM

## 2025-07-28 RX ORDER — PROMETHAZINE HYDROCHLORIDE 12.5 MG/1
12.5 TABLET ORAL EVERY 6 HOURS PRN
Status: DISCONTINUED | OUTPATIENT
Start: 2025-07-28 | End: 2025-07-28 | Stop reason: HOSPADM

## 2025-07-28 RX ORDER — ONDANSETRON 2 MG/ML
INJECTION INTRAMUSCULAR; INTRAVENOUS
Status: DISCONTINUED | OUTPATIENT
Start: 2025-07-28 | End: 2025-07-28 | Stop reason: SDUPTHER

## 2025-07-28 RX ORDER — FENTANYL CITRATE 50 UG/ML
INJECTION, SOLUTION INTRAMUSCULAR; INTRAVENOUS
Status: DISCONTINUED | OUTPATIENT
Start: 2025-07-28 | End: 2025-07-28 | Stop reason: SDUPTHER

## 2025-07-28 RX ORDER — SODIUM CHLORIDE 0.9 % (FLUSH) 0.9 %
5-40 SYRINGE (ML) INJECTION EVERY 12 HOURS SCHEDULED
Status: DISCONTINUED | OUTPATIENT
Start: 2025-07-28 | End: 2025-07-28 | Stop reason: HOSPADM

## 2025-07-28 RX ORDER — ONDANSETRON 2 MG/ML
4 INJECTION INTRAMUSCULAR; INTRAVENOUS
Status: DISCONTINUED | OUTPATIENT
Start: 2025-07-28 | End: 2025-07-28 | Stop reason: HOSPADM

## 2025-07-28 RX ORDER — FENTANYL CITRATE 50 UG/ML
25 INJECTION, SOLUTION INTRAMUSCULAR; INTRAVENOUS EVERY 5 MIN PRN
Status: DISCONTINUED | OUTPATIENT
Start: 2025-07-28 | End: 2025-07-28 | Stop reason: HOSPADM

## 2025-07-28 RX ORDER — LIDOCAINE HYDROCHLORIDE 20 MG/ML
INJECTION, SOLUTION EPIDURAL; INFILTRATION; INTRACAUDAL; PERINEURAL
Status: DISCONTINUED | OUTPATIENT
Start: 2025-07-28 | End: 2025-07-28 | Stop reason: SDUPTHER

## 2025-07-28 RX ORDER — MEPERIDINE HYDROCHLORIDE 25 MG/ML
12.5 INJECTION INTRAMUSCULAR; INTRAVENOUS; SUBCUTANEOUS EVERY 5 MIN PRN
Status: DISCONTINUED | OUTPATIENT
Start: 2025-07-28 | End: 2025-07-28 | Stop reason: HOSPADM

## 2025-07-28 RX ORDER — OXYCODONE AND ACETAMINOPHEN 5; 325 MG/1; MG/1
1 TABLET ORAL EVERY 6 HOURS PRN
Qty: 48 TABLET | Refills: 0 | Status: SHIPPED | OUTPATIENT
Start: 2025-07-28 | End: 2025-08-11

## 2025-07-28 RX ORDER — MIDAZOLAM HYDROCHLORIDE 1 MG/ML
INJECTION, SOLUTION INTRAMUSCULAR; INTRAVENOUS
Status: DISCONTINUED | OUTPATIENT
Start: 2025-07-28 | End: 2025-07-28 | Stop reason: SDUPTHER

## 2025-07-28 RX ORDER — MAGNESIUM HYDROXIDE 1200 MG/15ML
LIQUID ORAL CONTINUOUS PRN
Status: COMPLETED | OUTPATIENT
Start: 2025-07-28 | End: 2025-07-28

## 2025-07-28 RX ORDER — OXYCODONE HYDROCHLORIDE 5 MG/1
5 TABLET ORAL
Status: DISCONTINUED | OUTPATIENT
Start: 2025-07-28 | End: 2025-07-28 | Stop reason: HOSPADM

## 2025-07-28 RX ORDER — PROCHLORPERAZINE EDISYLATE 5 MG/ML
5 INJECTION INTRAMUSCULAR; INTRAVENOUS
Status: DISCONTINUED | OUTPATIENT
Start: 2025-07-28 | End: 2025-07-28 | Stop reason: HOSPADM

## 2025-07-28 RX ORDER — PROPOFOL 10 MG/ML
INJECTION, EMULSION INTRAVENOUS
Status: DISCONTINUED | OUTPATIENT
Start: 2025-07-28 | End: 2025-07-28 | Stop reason: SDUPTHER

## 2025-07-28 RX ADMIN — FENTANYL CITRATE 25 MCG: 50 INJECTION INTRAMUSCULAR; INTRAVENOUS at 11:57

## 2025-07-28 RX ADMIN — FENTANYL CITRATE 50 MCG: 50 INJECTION INTRAMUSCULAR; INTRAVENOUS at 11:59

## 2025-07-28 RX ADMIN — PROPOFOL 170 MG: 10 INJECTION, EMULSION INTRAVENOUS at 11:48

## 2025-07-28 RX ADMIN — PROPOFOL 30 MG: 10 INJECTION, EMULSION INTRAVENOUS at 11:59

## 2025-07-28 RX ADMIN — FENTANYL CITRATE 25 MCG: 50 INJECTION INTRAMUSCULAR; INTRAVENOUS at 11:58

## 2025-07-28 RX ADMIN — LIDOCAINE HYDROCHLORIDE 100 MG: 20 INJECTION, SOLUTION EPIDURAL; INFILTRATION; INTRACAUDAL; PERINEURAL at 11:48

## 2025-07-28 RX ADMIN — MIDAZOLAM 2 MG: 1 INJECTION, SOLUTION INTRAMUSCULAR; INTRAVENOUS at 11:42

## 2025-07-28 RX ADMIN — CEFAZOLIN 2 G: 330 INJECTION, POWDER, FOR SOLUTION INTRAMUSCULAR; INTRAVENOUS at 11:55

## 2025-07-28 RX ADMIN — METOPROLOL TARTRATE 50 MG: 50 TABLET, FILM COATED ORAL at 13:07

## 2025-07-28 RX ADMIN — PROMETHAZINE HYDROCHLORIDE 12.5 MG: 12.5 TABLET ORAL at 10:30

## 2025-07-28 RX ADMIN — SODIUM CHLORIDE, SODIUM LACTATE, POTASSIUM CHLORIDE, AND CALCIUM CHLORIDE: .6; .31; .03; .02 INJECTION, SOLUTION INTRAVENOUS at 10:40

## 2025-07-28 RX ADMIN — FAMOTIDINE 20 MG: 20 TABLET, FILM COATED ORAL at 10:30

## 2025-07-28 RX ADMIN — ONDANSETRON 4 MG: 2 INJECTION INTRAMUSCULAR; INTRAVENOUS at 12:09

## 2025-07-28 ASSESSMENT — PAIN SCALES - GENERAL
PAINLEVEL_OUTOF10: 0

## 2025-07-28 ASSESSMENT — PAIN - FUNCTIONAL ASSESSMENT
PAIN_FUNCTIONAL_ASSESSMENT: 0-10
PAIN_FUNCTIONAL_ASSESSMENT: NONE - DENIES PAIN

## 2025-07-28 NOTE — ANESTHESIA PRE PROCEDURE
110/78   06/11/25 128/78       NPO Status: Time of last liquid consumption: 2000                        Time of last solid consumption: 2200                        Date of last liquid consumption: 07/27/25                        Date of last solid food consumption: 07/27/25    BMI:   Wt Readings from Last 3 Encounters:   07/28/25 87.1 kg (192 lb)   06/26/25 89.7 kg (197 lb 12.8 oz)   06/11/25 88.5 kg (195 lb)     Body mass index is 27.55 kg/m².    CBC:   Lab Results   Component Value Date/Time    WBC 10.0 03/18/2025 07:44 AM    RBC 4.73 03/18/2025 07:44 AM    RBC 4.60 05/23/2023 08:20 AM    HGB 14.0 03/18/2025 07:44 AM    HCT 42.8 03/18/2025 07:44 AM    MCV 91 03/18/2025 07:44 AM    RDW 15.5 03/18/2025 07:44 AM     03/18/2025 07:44 AM       CMP:   Lab Results   Component Value Date/Time     06/18/2025 07:14 AM    K 3.3 06/18/2025 07:14 AM     06/18/2025 07:14 AM    CO2 25 06/18/2025 07:14 AM    BUN 10 06/18/2025 07:14 AM    CREATININE 0.9 06/18/2025 07:14 AM    GFRAA >60 03/12/2022 05:50 AM    AGRATIO 1.2 06/18/2025 07:14 AM    LABGLOM >90.0 06/18/2025 07:14 AM    LABGLOM >60.0 11/16/2023 10:44 AM    GLUCOSE 107 06/18/2025 07:14 AM    CALCIUM 9.0 06/18/2025 07:14 AM    BILITOT 0.4 06/18/2025 07:14 AM    ALKPHOS 206 06/18/2025 07:14 AM    AST 26 06/18/2025 07:14 AM    ALT 16 06/18/2025 07:14 AM       POC Tests: No results for input(s): \"POCGLU\", \"POCNA\", \"POCK\", \"POCCL\", \"POCBUN\", \"POCHEMO\", \"POCHCT\" in the last 72 hours.    Coags:   Lab Results   Component Value Date/Time    PROTIME 16.4 05/05/2024 09:12 AM    INR 1.3 05/05/2024 09:12 AM    APTT 79.8 03/11/2025 03:38 AM    APTT 43.2 03/07/2022 04:21 AM       HCG (If Applicable): No results found for: \"PREGTESTUR\", \"PREGSERUM\", \"HCG\", \"HCGQUANT\"     ABGs: No results found for: \"PHART\", \"PO2ART\", \"NJY7BCJ\", \"LMR2SDO\", \"BEART\", \"J8TDSRCM\"     Type & Screen (If Applicable):  Lab Results   Component Value Date    ABORH O POSITIVE 05/05/2024    LABANTI

## 2025-07-28 NOTE — PERIOP NOTE
Patient /Family /Designee has been informed that [facility] is not responsible for patient belongings per policy and the signed [facility] Patient Agreement document.  Personal items should be sent home or checked in with security.  Patient /Family /Designee selected the following action:                            [x]  Send personal items home with a family member or friend                                                 []  Check in personal items with security, excluding clothing                            []  Maintain personal items at the bedside, against recommendation                                 by Ted Persaud [facility]                                   ** If patient /family /designee chooses to maintain personal items at the bedside,                                      Complete the patient belongings inventory in the EMR.

## 2025-07-28 NOTE — ANESTHESIA POSTPROCEDURE EVALUATION
Department of Anesthesiology  Postprocedure Note    Patient: Jorden Artis  MRN: [id number]  YOB: 1960  Date of evaluation: 7/28/2025    Procedure Summary       Date: 07/28/25 Room / Location: [facility] / [facility] CARDIAC SURGERY    Anesthesia Start: 1142 Anesthesia Stop: 1225    Procedure: EXCISION LEFT ABOVE KNEE AMPUTATION ULCER (Left) Diagnosis:       Skin avulsion      (Skin avulsion [T14.8XXA])    Surgeons: Ayad Sesay MD Responsible Provider: Mario Ruiz MD    Anesthesia Type: General ASA Status: 3            Anesthesia Type: General    Estefania Phase I: Estefania Score: 10    Estefania Phase II: Estefania Score: 10    Anesthesia Post Evaluation    Patient location during evaluation: bedside  Patient participation: complete - patient participated  Level of consciousness: awake  Pain score: 0  Airway patency: patent  Nausea & Vomiting: no nausea  Cardiovascular status: hemodynamically stable  Respiratory status: acceptable  Hydration status: euvolemic  Pain management: satisfactory to patient        No notable events documented.

## 2025-07-28 NOTE — OP NOTE
Operative Note      Patient: Jorden Artis  YOB: 1960  MRN: [id number]    Date of Procedure: 7/28/2025    Pre-Op Diagnosis Codes:      * Skin avulsion [T14.8XXA]    Post-Op Diagnosis: Chronic ulcer left above-knee amputation       Procedure(s):  EXCISION LEFT ABOVE KNEE AMPUTATION ULCER    Surgeon(s):  Ayad Mccormack MD    Assistant:   Surgical Assistant: Yelena Rodrigues    Anesthesia: General    Estimated Blood Loss (mL): Minimal    Complications: None    Specimens:   ID Type Source Tests Collected by Time Destination   1 :  Tissue   Ayad Mccormack MD 7/28/2025 1203    A : LEFT LEG STUMP ULCER Tissue Tissue SURGICAL PATHOLOGY Ayad Mccormack MD 7/28/2025 1203        Implants:  * No implants in log *      Drains:   [REMOVED] Urinary Catheter 05/06/24 2 Way (Removed)       [REMOVED] Urinary Catheter 02/27/25 2 Way (Removed)       [REMOVED] External Urinary Catheter (Removed)       Findings:  Present At Time Of Surgery (PATOS) (choose all levels that have infection present):  No infection present  Other Findings: Dry ulcer left above-knee amputation    Detailed Description of Procedure:   Patient had general anesthesia appropriate antibiotics.  Left above-knee potation was prepped draped usual standard fashion.  I made an elliptical incision around the ulcer and followed this down all the way to its base till normal tissue was encountered.  Basically excised a wedge that was 3 cm x 1.5 cm.  Irrigated and closed with interrupted mattress sutures.  He tolerated this nicely sterile dressing was applied he is transferred back to recovery    Electronically signed by Ayad Mccormack MD on 7/28/2025 at 12:23 PM

## 2025-07-28 NOTE — DISCHARGE INSTRUCTIONS
RESUME ROSA MARIA HOYT per Dr. Vasquez.        DISCHARGE SUMMARY from Nurse    PATIENT INSTRUCTIONS:    After general anesthesia or intravenous sedation, for 24 hours or while taking prescription Narcotics:  Limit your activities  Do not drive and operate hazardous machinery  Do not make important personal or business decisions  Do  not drink alcoholic beverages  If you have not urinated within 8 hours after discharge, please contact your surgeon on call.    Report the following to your surgeon:  Excessive pain, swelling, redness or odor of or around the surgical area  Temperature over 100.5  Nausea and vomiting lasting longer than 4 hours or if unable to take medications  Any signs of decreased circulation or nerve impairment to extremity: change in color, persistent  numbness, tingling, coldness or increase pain  Any questions    These are general instructions for a healthy lifestyle:    No smoking/ No tobacco products/ Avoid exposure to second hand smoke  Surgeon General's Warning:  Quitting smoking now greatly reduces serious risk to your health.    Obesity, smoking, and sedentary lifestyle greatly increases your risk for illness    A healthy diet, regular physical exercise & weight monitoring are important for maintaining a healthy lifestyle    You may be retaining fluid if you have a history of heart failure or if you experience any of the following symptoms:  Weight gain of 3 pounds or more overnight or 5 pounds in a week, increased swelling in our hands or feet or shortness of breath while lying flat in bed.  Please call your doctor as soon as you notice any of these symptoms; do not wait until your next office visit.        The discharge information has been reviewed with the patient.  The patient verbalized understanding.  Discharge medications reviewed with the patient and appropriate educational materials and side effects teaching were

## 2025-07-28 NOTE — PERIOP NOTE
Patient /Family /Designee has been informed that [facility] is not responsible for patient belongings per policy and the signed [facility] Patient Agreement document.  Personal items should be sent home or checked in with security.  Patient /Family /Designee selected the following action:                            []  Send personal items home with a family member or friend                                                 []  Check in personal items with security, excluding clothing                            [x]  Maintain personal items at the bedside, against recommendation                                 by Ted Persaud [facility]                                   ** If patient /family /designee chooses to maintain personal items at the bedside,                                      Complete the patient belongings inventory in the EMR.

## 2025-07-28 NOTE — H&P
Surgery History and Physical    Subjective:      Jorden Artis is a 65 y.o.  male who presents with chronic ulceration left above-knee amputation stump without acute infection.  He is painful.    Patient Active Problem List    Diagnosis Date Noted    Occlusion of right femoral artery 02/27/2025    Vitamin D deficiency 06/26/2025    Cardiomyopathy ([facility]) 03/01/2025    Hypertension 03/01/2025    Other hyperlipidemia 03/01/2025    Paroxysmal A-fib ([facility]) 09/26/2024    IFG (impaired fasting glucose) 03/25/2024    Amputation of toe of right foot 03/25/2024    Atrial fibrillation with rapid ventricular response ([facility])     Ischemic cardiomyopathy     Chronic systolic heart failure ([facility])     Adverse effect of amiodarone 08/19/2017    Status post above-knee amputation of left lower extremity ([facility]) 08/18/2017    Impaired mobility and ADLs 07/24/2017    Moderate to severe pulmonary hypertension ([facility]) 07/23/2017    Status post femoral-popliteal bypass surgery 04/21/2017    Dyslipidemia     Carotid artery disease     History of cardioversion 04/18/2017    Coronary artery disease due to lipid rich plaque 03/15/2017    Atherosclerosis of native artery of both lower extremities with intermittent claudication 01/25/2017    Aortoiliac occlusive disease (HCC) 01/25/2017    PAD (peripheral artery disease) 01/25/2017    Spinal stenosis of lumbar region with radiculopathy 05/04/2015    DDD (degenerative disc disease), lumbar 01/26/2015     Past Medical History:   Diagnosis Date    Acute blood loss as cause of postoperative anemia 4/4/2017    Anticoagulated on Coumadin     Aortoiliac occlusive disease (HCC) 1/25/2017    Atherosclerosis of native artery of both lower extremities with intermittent claudication 1/25/2017    Benign hypertensive heart disease with systolic congestive heart failure, NYHA class 2 ([facility]) 1/26/2015    Carotid artery disease     Chronic anemia     Chronic systolic heart failure ([facility])

## 2025-08-21 ENCOUNTER — APPOINTMENT (OUTPATIENT)
Age: 65
DRG: 564 | End: 2025-08-21
Payer: MEDICARE

## 2025-08-21 ENCOUNTER — HOSPITAL ENCOUNTER (INPATIENT)
Age: 65
LOS: 2 days | Discharge: HOME HEALTH | DRG: 564 | End: 2025-08-23
Attending: EMERGENCY MEDICINE | Admitting: INTERNAL MEDICINE
Payer: MEDICARE

## 2025-08-21 ENCOUNTER — OFFICE VISIT (OUTPATIENT)
Facility: CLINIC | Age: 65
End: 2025-08-21
Payer: MEDICARE

## 2025-08-21 VITALS
DIASTOLIC BLOOD PRESSURE: 70 MMHG | OXYGEN SATURATION: 92 % | SYSTOLIC BLOOD PRESSURE: 106 MMHG | BODY MASS INDEX: 27.55 KG/M2 | TEMPERATURE: 95.5 F | HEIGHT: 70 IN | RESPIRATION RATE: 22 BRPM | HEART RATE: 117 BPM

## 2025-08-21 DIAGNOSIS — I48.91 RAPID ATRIAL FIBRILLATION (HCC): ICD-10-CM

## 2025-08-21 DIAGNOSIS — R06.02 SHORTNESS OF BREATH: Primary | ICD-10-CM

## 2025-08-21 DIAGNOSIS — I25.10 CORONARY ARTERY DISEASE DUE TO LIPID RICH PLAQUE: ICD-10-CM

## 2025-08-21 DIAGNOSIS — I48.0 PAROXYSMAL A-FIB (HCC): ICD-10-CM

## 2025-08-21 DIAGNOSIS — I25.83 CORONARY ARTERY DISEASE DUE TO LIPID RICH PLAQUE: ICD-10-CM

## 2025-08-21 DIAGNOSIS — I50.22 CHRONIC SYSTOLIC HEART FAILURE (HCC): ICD-10-CM

## 2025-08-21 DIAGNOSIS — E87.20 LACTIC ACID ACIDOSIS: ICD-10-CM

## 2025-08-21 DIAGNOSIS — R65.20 SEVERE SEPSIS (HCC): Primary | ICD-10-CM

## 2025-08-21 DIAGNOSIS — I73.9 PAD (PERIPHERAL ARTERY DISEASE): ICD-10-CM

## 2025-08-21 DIAGNOSIS — R61 SWEATING PROFUSELY: ICD-10-CM

## 2025-08-21 DIAGNOSIS — A41.9 SEVERE SEPSIS (HCC): Primary | ICD-10-CM

## 2025-08-21 DIAGNOSIS — R00.0 SINUS TACHYCARDIA: ICD-10-CM

## 2025-08-21 LAB
ALBUMIN SERPL-MCNC: 4.3 G/DL (ref 3.4–5)
ALBUMIN/GLOB SERPL: 0.9 (ref 1–1.9)
ALP SERPL-CCNC: 246 U/L (ref 45–117)
ALT SERPL-CCNC: 28 U/L (ref 10–50)
ANION GAP SERPL CALC-SCNC: 24 MMOL/L (ref 7–16)
APPEARANCE UR: CLEAR
APTT PPP: 36.4 SEC (ref 21.7–34.2)
AST SERPL-CCNC: 77 U/L (ref 10–38)
BACTERIA URNS QL MICRO: NEGATIVE /HPF
BASOPHILS # BLD: 0.12 K/UL (ref 0–0.1)
BASOPHILS NFR BLD: 1 % (ref 0–2)
BILIRUB SERPL-MCNC: 0.8 MG/DL (ref 0.2–1)
BILIRUB UR QL: NEGATIVE
BUN SERPL-MCNC: 14 MG/DL (ref 6–23)
BUN/CREAT SERPL: 8
CALCIUM SERPL-MCNC: 10.2 MG/DL (ref 8.5–10.1)
CHLORIDE SERPL-SCNC: 97 MMOL/L (ref 98–107)
CO2 SERPL-SCNC: 17 MMOL/L (ref 21–32)
COLOR UR: YELLOW
CREAT SERPL-MCNC: 1.7 MG/DL (ref 0.6–1.3)
DIFFERENTIAL METHOD BLD: ABNORMAL
EKG ATRIAL RATE: 156 BPM
EKG DIAGNOSIS: NORMAL
EKG P AXIS: 26 DEGREES
EKG P-R INTERVAL: 178 MS
EKG Q-T INTERVAL: 286 MS
EKG QRS DURATION: 90 MS
EKG QTC CALCULATION (BAZETT): 441 MS
EKG R AXIS: 10 DEGREES
EKG T AXIS: 246 DEGREES
EKG VENTRICULAR RATE: 143 BPM
EOSINOPHIL # BLD: 0.23 K/UL (ref 0–0.4)
EOSINOPHIL NFR BLD: 2 % (ref 0–5)
EPITH CASTS URNS QL MICRO: NORMAL /LPF (ref 0–5)
ERYTHROCYTE [DISTWIDTH] IN BLOOD BY AUTOMATED COUNT: 19 % (ref 11.6–14.5)
FLUAV RNA SPEC QL NAA+PROBE: NOT DETECTED
FLUBV RNA SPEC QL NAA+PROBE: NOT DETECTED
GLOBULIN SER CALC-MCNC: 4.7 G/DL (ref 2.3–3.5)
GLUCOSE SERPL-MCNC: 104 MG/DL (ref 74–108)
GLUCOSE UR STRIP.AUTO-MCNC: NEGATIVE MG/DL
HCT VFR BLD AUTO: 46.5 % (ref 36–48)
HGB BLD-MCNC: 16.2 G/DL (ref 13–16)
HGB UR QL STRIP: NEGATIVE
HYALINE CASTS URNS QL MICRO: NORMAL /LPF (ref 0–2)
IMM GRANULOCYTES # BLD AUTO: 0 K/UL
IMM GRANULOCYTES NFR BLD AUTO: 0 %
INR PPP: 1.9 (ref 0.9–1.2)
KETONES UR QL STRIP.AUTO: NEGATIVE MG/DL
LACTATE BLD-SCNC: 2.05 MMOL/L (ref 0.4–2)
LACTATE BLD-SCNC: 4.53 MMOL/L (ref 0.4–2)
LEUKOCYTE ESTERASE UR QL STRIP.AUTO: ABNORMAL
LYMPHOCYTES # BLD: 3.74 K/UL (ref 0.9–3.6)
LYMPHOCYTES NFR BLD: 32 % (ref 21–52)
MAGNESIUM SERPL-MCNC: 1.9 MG/DL (ref 1.6–2.6)
MCH RBC QN AUTO: 27.7 PG (ref 24–34)
MCHC RBC AUTO-ENTMCNC: 34.8 G/DL (ref 31–37)
MCV RBC AUTO: 79.6 FL (ref 78–100)
MONOCYTES # BLD: 0.59 K/UL (ref 0.05–1.2)
MONOCYTES NFR BLD: 5 % (ref 3–10)
NEUTS SEG # BLD: 7.02 K/UL (ref 1.8–8)
NEUTS SEG NFR BLD: 60 % (ref 40–73)
NITRITE UR QL STRIP.AUTO: NEGATIVE
NRBC # BLD: 0 K/UL (ref 0–0.01)
NRBC BLD-RTO: 0 PER 100 WBC
NT PRO BNP: 2038 PG/ML (ref 36–900)
PH UR STRIP: 7.5 (ref 5–8)
PLATELET # BLD AUTO: 384 K/UL (ref 135–420)
PLATELET COMMENT: ABNORMAL
PMV BLD AUTO: 9 FL (ref 9.2–11.8)
POTASSIUM SERPL-SCNC: 4.9 MMOL/L (ref 3.5–5.5)
PROCALCITONIN SERPL-MCNC: 0.11 NG/ML
PROT SERPL-MCNC: 9 G/DL (ref 6.4–8.2)
PROT UR STRIP-MCNC: 30 MG/DL
PROTHROMBIN TIME: 21.8 SEC (ref 12–15.1)
RBC # BLD AUTO: 5.84 M/UL (ref 4.35–5.65)
RBC #/AREA URNS HPF: NORMAL /HPF (ref 0–5)
RBC MORPH BLD: ABNORMAL
SARS-COV-2 RNA RESP QL NAA+PROBE: NOT DETECTED
SODIUM SERPL-SCNC: 138 MMOL/L (ref 136–145)
SOURCE: NORMAL
SP GR UR REFRACTOMETRY: 1.02 (ref 1–1.03)
TROPONIN T SERPL HS-MCNC: 16.9 NG/L (ref 0–22)
TSH, 3RD GENERATION: 2.47 UIU/ML (ref 0.27–4.2)
UROBILINOGEN UR QL STRIP.AUTO: 1 EU/DL (ref 0.2–1)
WBC # BLD AUTO: 11.7 K/UL (ref 4.6–13.2)
WBC URNS QL MICRO: NORMAL /HPF (ref 0–4)

## 2025-08-21 PROCEDURE — 83605 ASSAY OF LACTIC ACID: CPT

## 2025-08-21 PROCEDURE — 81003 URINALYSIS AUTO W/O SCOPE: CPT

## 2025-08-21 PROCEDURE — 3078F DIAST BP <80 MM HG: CPT | Performed by: FAMILY MEDICINE

## 2025-08-21 PROCEDURE — 71045 X-RAY EXAM CHEST 1 VIEW: CPT

## 2025-08-21 PROCEDURE — 2580000003 HC RX 258: Performed by: INTERNAL MEDICINE

## 2025-08-21 PROCEDURE — 83735 ASSAY OF MAGNESIUM: CPT

## 2025-08-21 PROCEDURE — 96375 TX/PRO/DX INJ NEW DRUG ADDON: CPT

## 2025-08-21 PROCEDURE — 3074F SYST BP LT 130 MM HG: CPT | Performed by: FAMILY MEDICINE

## 2025-08-21 PROCEDURE — 85610 PROTHROMBIN TIME: CPT

## 2025-08-21 PROCEDURE — 80053 COMPREHEN METABOLIC PANEL: CPT

## 2025-08-21 PROCEDURE — 2580000003 HC RX 258: Performed by: EMERGENCY MEDICINE

## 2025-08-21 PROCEDURE — 87086 URINE CULTURE/COLONY COUNT: CPT

## 2025-08-21 PROCEDURE — 87205 SMEAR GRAM STAIN: CPT

## 2025-08-21 PROCEDURE — 96365 THER/PROPH/DIAG IV INF INIT: CPT

## 2025-08-21 PROCEDURE — 2500000003 HC RX 250 WO HCPCS: Performed by: EMERGENCY MEDICINE

## 2025-08-21 PROCEDURE — 1100000000 HC RM PRIVATE

## 2025-08-21 PROCEDURE — 85025 COMPLETE CBC W/AUTO DIFF WBC: CPT

## 2025-08-21 PROCEDURE — 6370000000 HC RX 637 (ALT 250 FOR IP): Performed by: NURSE PRACTITIONER

## 2025-08-21 PROCEDURE — 93005 ELECTROCARDIOGRAM TRACING: CPT

## 2025-08-21 PROCEDURE — 81001 URINALYSIS AUTO W/SCOPE: CPT

## 2025-08-21 PROCEDURE — 6360000002 HC RX W HCPCS: Performed by: NURSE PRACTITIONER

## 2025-08-21 PROCEDURE — 87186 SC STD MICRODIL/AGAR DIL: CPT

## 2025-08-21 PROCEDURE — 84145 PROCALCITONIN (PCT): CPT

## 2025-08-21 PROCEDURE — 1123F ACP DISCUSS/DSCN MKR DOCD: CPT | Performed by: FAMILY MEDICINE

## 2025-08-21 PROCEDURE — 6370000000 HC RX 637 (ALT 250 FOR IP): Performed by: EMERGENCY MEDICINE

## 2025-08-21 PROCEDURE — 1159F MED LIST DOCD IN RCRD: CPT | Performed by: FAMILY MEDICINE

## 2025-08-21 PROCEDURE — 2500000003 HC RX 250 WO HCPCS: Performed by: NURSE PRACTITIONER

## 2025-08-21 PROCEDURE — 99214 OFFICE O/P EST MOD 30 MIN: CPT | Performed by: FAMILY MEDICINE

## 2025-08-21 PROCEDURE — 84443 ASSAY THYROID STIM HORMONE: CPT

## 2025-08-21 PROCEDURE — 85730 THROMBOPLASTIN TIME PARTIAL: CPT

## 2025-08-21 PROCEDURE — 84484 ASSAY OF TROPONIN QUANT: CPT

## 2025-08-21 PROCEDURE — 1160F RVW MEDS BY RX/DR IN RCRD: CPT | Performed by: FAMILY MEDICINE

## 2025-08-21 PROCEDURE — 87636 SARSCOV2 & INF A&B AMP PRB: CPT

## 2025-08-21 PROCEDURE — 87040 BLOOD CULTURE FOR BACTERIA: CPT

## 2025-08-21 PROCEDURE — 96367 TX/PROPH/DG ADDL SEQ IV INF: CPT

## 2025-08-21 PROCEDURE — 87077 CULTURE AEROBIC IDENTIFY: CPT

## 2025-08-21 PROCEDURE — 99285 EMERGENCY DEPT VISIT HI MDM: CPT

## 2025-08-21 PROCEDURE — 1126F AMNT PAIN NOTED NONE PRSNT: CPT | Performed by: FAMILY MEDICINE

## 2025-08-21 PROCEDURE — 87070 CULTURE OTHR SPECIMN AEROBIC: CPT

## 2025-08-21 PROCEDURE — 6360000002 HC RX W HCPCS: Performed by: EMERGENCY MEDICINE

## 2025-08-21 PROCEDURE — 73552 X-RAY EXAM OF FEMUR 2/>: CPT

## 2025-08-21 PROCEDURE — 83880 ASSAY OF NATRIURETIC PEPTIDE: CPT

## 2025-08-21 RX ORDER — POTASSIUM CHLORIDE 1500 MG/1
40 TABLET, EXTENDED RELEASE ORAL PRN
Status: DISCONTINUED | OUTPATIENT
Start: 2025-08-21 | End: 2025-08-23 | Stop reason: HOSPADM

## 2025-08-21 RX ORDER — SULFAMETHOXAZOLE AND TRIMETHOPRIM 800; 160 MG/1; MG/1
1 TABLET ORAL 2 TIMES DAILY
COMMUNITY
Start: 2025-08-19 | End: 2025-08-30

## 2025-08-21 RX ORDER — ENOXAPARIN SODIUM 100 MG/ML
40 INJECTION SUBCUTANEOUS EVERY 24 HOURS
Status: DISCONTINUED | OUTPATIENT
Start: 2025-08-21 | End: 2025-08-22 | Stop reason: SDUPTHER

## 2025-08-21 RX ORDER — DILTIAZEM HYDROCHLORIDE 5 MG/ML
5 INJECTION INTRAVENOUS
Status: DISCONTINUED | OUTPATIENT
Start: 2025-08-21 | End: 2025-08-21

## 2025-08-21 RX ORDER — POTASSIUM CHLORIDE 7.45 MG/ML
10 INJECTION INTRAVENOUS PRN
Status: DISCONTINUED | OUTPATIENT
Start: 2025-08-21 | End: 2025-08-23 | Stop reason: HOSPADM

## 2025-08-21 RX ORDER — SODIUM CHLORIDE 9 MG/ML
INJECTION, SOLUTION INTRAVENOUS PRN
Status: DISCONTINUED | OUTPATIENT
Start: 2025-08-21 | End: 2025-08-23 | Stop reason: HOSPADM

## 2025-08-21 RX ORDER — MAGNESIUM SULFATE IN WATER 40 MG/ML
2000 INJECTION, SOLUTION INTRAVENOUS PRN
Status: DISCONTINUED | OUTPATIENT
Start: 2025-08-21 | End: 2025-08-23 | Stop reason: HOSPADM

## 2025-08-21 RX ORDER — ACETAMINOPHEN 325 MG/1
650 TABLET ORAL EVERY 6 HOURS PRN
Status: DISCONTINUED | OUTPATIENT
Start: 2025-08-21 | End: 2025-08-23 | Stop reason: HOSPADM

## 2025-08-21 RX ORDER — ONDANSETRON 4 MG/1
4 TABLET, ORALLY DISINTEGRATING ORAL EVERY 8 HOURS PRN
Status: DISCONTINUED | OUTPATIENT
Start: 2025-08-21 | End: 2025-08-23 | Stop reason: HOSPADM

## 2025-08-21 RX ORDER — MAGNESIUM SULFATE IN WATER 40 MG/ML
2000 INJECTION, SOLUTION INTRAVENOUS ONCE
Status: COMPLETED | OUTPATIENT
Start: 2025-08-21 | End: 2025-08-22

## 2025-08-21 RX ORDER — METOPROLOL TARTRATE 25 MG/1
25 TABLET, FILM COATED ORAL 2 TIMES DAILY
Status: DISCONTINUED | OUTPATIENT
Start: 2025-08-21 | End: 2025-08-21

## 2025-08-21 RX ORDER — ACETAMINOPHEN 650 MG/1
650 SUPPOSITORY RECTAL EVERY 6 HOURS PRN
Status: DISCONTINUED | OUTPATIENT
Start: 2025-08-21 | End: 2025-08-23 | Stop reason: HOSPADM

## 2025-08-21 RX ORDER — GUAIFENESIN 200 MG/10ML
200 LIQUID ORAL EVERY 4 HOURS PRN
Status: DISCONTINUED | OUTPATIENT
Start: 2025-08-21 | End: 2025-08-23 | Stop reason: HOSPADM

## 2025-08-21 RX ORDER — SODIUM CHLORIDE 0.9 % (FLUSH) 0.9 %
5-40 SYRINGE (ML) INJECTION PRN
Status: DISCONTINUED | OUTPATIENT
Start: 2025-08-21 | End: 2025-08-23 | Stop reason: HOSPADM

## 2025-08-21 RX ORDER — 0.9 % SODIUM CHLORIDE 0.9 %
1000 INTRAVENOUS SOLUTION INTRAVENOUS ONCE
Status: COMPLETED | OUTPATIENT
Start: 2025-08-21 | End: 2025-08-21

## 2025-08-21 RX ORDER — METOPROLOL TARTRATE 1 MG/ML
5 INJECTION, SOLUTION INTRAVENOUS
Status: COMPLETED | OUTPATIENT
Start: 2025-08-21 | End: 2025-08-21

## 2025-08-21 RX ORDER — 0.9 % SODIUM CHLORIDE 0.9 %
250 INTRAVENOUS SOLUTION INTRAVENOUS ONCE
Status: COMPLETED | OUTPATIENT
Start: 2025-08-21 | End: 2025-08-21

## 2025-08-21 RX ORDER — DILTIAZEM HYDROCHLORIDE 5 MG/ML
10 INJECTION INTRAVENOUS
Status: DISCONTINUED | OUTPATIENT
Start: 2025-08-21 | End: 2025-08-21

## 2025-08-21 RX ORDER — FUROSEMIDE 20 MG/1
40 TABLET ORAL 2 TIMES DAILY
Status: DISCONTINUED | OUTPATIENT
Start: 2025-08-21 | End: 2025-08-23 | Stop reason: HOSPADM

## 2025-08-21 RX ORDER — VANCOMYCIN 1.25 G/250ML
1750 INJECTION, SOLUTION INTRAVENOUS ONCE
Status: COMPLETED | OUTPATIENT
Start: 2025-08-21 | End: 2025-08-21

## 2025-08-21 RX ORDER — METOPROLOL TARTRATE 1 MG/ML
5 INJECTION, SOLUTION INTRAVENOUS
Status: DISCONTINUED | OUTPATIENT
Start: 2025-08-21 | End: 2025-08-23 | Stop reason: HOSPADM

## 2025-08-21 RX ORDER — SODIUM CHLORIDE, SODIUM LACTATE, POTASSIUM CHLORIDE, CALCIUM CHLORIDE 600; 310; 30; 20 MG/100ML; MG/100ML; MG/100ML; MG/100ML
INJECTION, SOLUTION INTRAVENOUS CONTINUOUS
Status: DISCONTINUED | OUTPATIENT
Start: 2025-08-21 | End: 2025-08-22

## 2025-08-21 RX ORDER — CLOPIDOGREL BISULFATE 75 MG/1
75 TABLET ORAL DAILY
Status: DISCONTINUED | OUTPATIENT
Start: 2025-08-22 | End: 2025-08-23 | Stop reason: HOSPADM

## 2025-08-21 RX ORDER — METOPROLOL TARTRATE 50 MG
50 TABLET ORAL 3 TIMES DAILY
Status: DISCONTINUED | OUTPATIENT
Start: 2025-08-21 | End: 2025-08-23 | Stop reason: HOSPADM

## 2025-08-21 RX ORDER — SODIUM CHLORIDE 0.9 % (FLUSH) 0.9 %
5-40 SYRINGE (ML) INJECTION EVERY 12 HOURS SCHEDULED
Status: DISCONTINUED | OUTPATIENT
Start: 2025-08-21 | End: 2025-08-23 | Stop reason: HOSPADM

## 2025-08-21 RX ORDER — MAGNESIUM HYDROXIDE/ALUMINUM HYDROXICE/SIMETHICONE 120; 1200; 1200 MG/30ML; MG/30ML; MG/30ML
30 SUSPENSION ORAL EVERY 6 HOURS PRN
Status: DISCONTINUED | OUTPATIENT
Start: 2025-08-21 | End: 2025-08-23 | Stop reason: HOSPADM

## 2025-08-21 RX ORDER — LORAZEPAM 0.5 MG/1
0.5 TABLET ORAL ONCE
Status: COMPLETED | OUTPATIENT
Start: 2025-08-21 | End: 2025-08-21

## 2025-08-21 RX ORDER — 0.9 % SODIUM CHLORIDE 0.9 %
750 INTRAVENOUS SOLUTION INTRAVENOUS ONCE
Status: COMPLETED | OUTPATIENT
Start: 2025-08-21 | End: 2025-08-21

## 2025-08-21 RX ORDER — ROSUVASTATIN CALCIUM 20 MG/1
40 TABLET, COATED ORAL DAILY
Status: DISCONTINUED | OUTPATIENT
Start: 2025-08-22 | End: 2025-08-23 | Stop reason: HOSPADM

## 2025-08-21 RX ORDER — IPRATROPIUM BROMIDE AND ALBUTEROL SULFATE 2.5; .5 MG/3ML; MG/3ML
1 SOLUTION RESPIRATORY (INHALATION) EVERY 4 HOURS PRN
Status: DISCONTINUED | OUTPATIENT
Start: 2025-08-21 | End: 2025-08-23 | Stop reason: HOSPADM

## 2025-08-21 RX ORDER — POLYETHYLENE GLYCOL 3350 17 G/17G
17 POWDER, FOR SOLUTION ORAL DAILY PRN
Status: DISCONTINUED | OUTPATIENT
Start: 2025-08-21 | End: 2025-08-23 | Stop reason: HOSPADM

## 2025-08-21 RX ORDER — ONDANSETRON 2 MG/ML
4 INJECTION INTRAMUSCULAR; INTRAVENOUS EVERY 6 HOURS PRN
Status: DISCONTINUED | OUTPATIENT
Start: 2025-08-21 | End: 2025-08-23 | Stop reason: HOSPADM

## 2025-08-21 RX ADMIN — SODIUM CHLORIDE 1000 ML: 0.9 INJECTION, SOLUTION INTRAVENOUS at 11:45

## 2025-08-21 RX ADMIN — PREGABALIN 75 MG: 50 CAPSULE ORAL at 20:39

## 2025-08-21 RX ADMIN — MAGNESIUM SULFATE HEPTAHYDRATE 2000 MG: 40 INJECTION, SOLUTION INTRAVENOUS at 21:40

## 2025-08-21 RX ADMIN — SODIUM CHLORIDE, PRESERVATIVE FREE 10 ML: 5 INJECTION INTRAVENOUS at 19:33

## 2025-08-21 RX ADMIN — SODIUM CHLORIDE 750 ML: 0.9 INJECTION, SOLUTION INTRAVENOUS at 13:19

## 2025-08-21 RX ADMIN — METOPROLOL TARTRATE 50 MG: 50 TABLET, FILM COATED ORAL at 20:39

## 2025-08-21 RX ADMIN — VANCOMYCIN 1750 MG: 1.25 INJECTION, SOLUTION INTRAVENOUS at 12:33

## 2025-08-21 RX ADMIN — PIPERACILLIN AND TAZOBACTAM 4500 MG: 4; .5 INJECTION, POWDER, LYOPHILIZED, FOR SOLUTION INTRAVENOUS at 11:41

## 2025-08-21 RX ADMIN — Medication 3 MG: at 21:36

## 2025-08-21 RX ADMIN — APIXABAN 5 MG: 5 TABLET, FILM COATED ORAL at 20:39

## 2025-08-21 RX ADMIN — METOPROLOL TARTRATE 5 MG: 5 INJECTION INTRAVENOUS at 13:28

## 2025-08-21 RX ADMIN — SODIUM CHLORIDE 250 ML: 0.9 INJECTION, SOLUTION INTRAVENOUS at 16:48

## 2025-08-21 RX ADMIN — LORAZEPAM 0.5 MG: 0.5 TABLET ORAL at 13:41

## 2025-08-21 RX ADMIN — SODIUM CHLORIDE, SODIUM LACTATE, POTASSIUM CHLORIDE, AND CALCIUM CHLORIDE: .6; .31; .03; .02 INJECTION, SOLUTION INTRAVENOUS at 19:33

## 2025-08-21 RX ADMIN — CEFEPIME 2000 MG: 2 INJECTION, POWDER, FOR SOLUTION INTRAVENOUS at 12:19

## 2025-08-21 ASSESSMENT — PAIN SCALES - GENERAL
PAINLEVEL_OUTOF10: 0
PAINLEVEL_OUTOF10: 0

## 2025-08-21 ASSESSMENT — ENCOUNTER SYMPTOMS
RESPIRATORY NEGATIVE: 1
EYES NEGATIVE: 1
ALLERGIC/IMMUNOLOGIC NEGATIVE: 1
GASTROINTESTINAL NEGATIVE: 1

## 2025-08-21 ASSESSMENT — PAIN - FUNCTIONAL ASSESSMENT: PAIN_FUNCTIONAL_ASSESSMENT: 0-10

## 2025-08-22 LAB
ANION GAP SERPL CALC-SCNC: 17 MMOL/L (ref 7–16)
APTT PPP: 35.6 SEC (ref 21.7–34.2)
BACTERIA SPEC CULT: NORMAL
BASOPHILS # BLD: 0.07 K/UL (ref 0–0.1)
BASOPHILS NFR BLD: 0.7 % (ref 0–2)
BUN SERPL-MCNC: 12 MG/DL (ref 6–23)
BUN/CREAT SERPL: 9
CALCIUM SERPL-MCNC: 8.5 MG/DL (ref 8.5–10.1)
CHLORIDE SERPL-SCNC: 106 MMOL/L (ref 98–107)
CO2 SERPL-SCNC: 18 MMOL/L (ref 21–32)
CREAT SERPL-MCNC: 1.25 MG/DL (ref 0.6–1.3)
DIFFERENTIAL METHOD BLD: ABNORMAL
EKG DIAGNOSIS: NORMAL
EKG Q-T INTERVAL: 398 MS
EKG QRS DURATION: 90 MS
EKG QTC CALCULATION (BAZETT): 470 MS
EKG R AXIS: -1 DEGREES
EKG T AXIS: -69 DEGREES
EKG VENTRICULAR RATE: 84 BPM
EOSINOPHIL # BLD: 0.05 K/UL (ref 0–0.4)
EOSINOPHIL NFR BLD: 0.5 % (ref 0–5)
ERYTHROCYTE [DISTWIDTH] IN BLOOD BY AUTOMATED COUNT: 18.6 % (ref 11.6–14.5)
GLUCOSE SERPL-MCNC: 96 MG/DL (ref 74–108)
HCT VFR BLD AUTO: 39.5 % (ref 36–48)
HGB BLD-MCNC: 13.6 G/DL (ref 13–16)
IMM GRANULOCYTES # BLD AUTO: 0.02 K/UL (ref 0–0.04)
IMM GRANULOCYTES NFR BLD AUTO: 0.2 % (ref 0–0.5)
INR PPP: 1.8 (ref 0.9–1.2)
LYMPHOCYTES # BLD: 1.97 K/UL (ref 0.9–3.6)
LYMPHOCYTES NFR BLD: 20.5 % (ref 21–52)
MAGNESIUM SERPL-MCNC: 2.4 MG/DL (ref 1.6–2.6)
MCH RBC QN AUTO: 27.9 PG (ref 24–34)
MCHC RBC AUTO-ENTMCNC: 34.4 G/DL (ref 31–37)
MCV RBC AUTO: 81.1 FL (ref 78–100)
MONOCYTES # BLD: 1.06 K/UL (ref 0.05–1.2)
MONOCYTES NFR BLD: 11 % (ref 3–10)
NEUTS SEG # BLD: 6.44 K/UL (ref 1.8–8)
NEUTS SEG NFR BLD: 67.1 % (ref 40–73)
NRBC # BLD: 0 K/UL (ref 0–0.01)
NRBC BLD-RTO: 0 PER 100 WBC
PLATELET # BLD AUTO: 279 K/UL (ref 135–420)
PMV BLD AUTO: 8.7 FL (ref 9.2–11.8)
POTASSIUM SERPL-SCNC: 3.2 MMOL/L (ref 3.5–5.5)
PROTHROMBIN TIME: 21.1 SEC (ref 12–15.1)
RBC # BLD AUTO: 4.87 M/UL (ref 4.35–5.65)
SERVICE CMNT-IMP: NORMAL
SODIUM SERPL-SCNC: 140 MMOL/L (ref 136–145)
VANCOMYCIN SERPL-MCNC: 10.4 UG/ML (ref 5–40)
WBC # BLD AUTO: 9.6 K/UL (ref 4.6–13.2)

## 2025-08-22 PROCEDURE — 6360000002 HC RX W HCPCS: Performed by: INTERNAL MEDICINE

## 2025-08-22 PROCEDURE — 2580000003 HC RX 258: Performed by: INTERNAL MEDICINE

## 2025-08-22 PROCEDURE — 85610 PROTHROMBIN TIME: CPT

## 2025-08-22 PROCEDURE — 80202 ASSAY OF VANCOMYCIN: CPT

## 2025-08-22 PROCEDURE — 83735 ASSAY OF MAGNESIUM: CPT

## 2025-08-22 PROCEDURE — 85025 COMPLETE CBC W/AUTO DIFF WBC: CPT

## 2025-08-22 PROCEDURE — 2500000003 HC RX 250 WO HCPCS: Performed by: INTERNAL MEDICINE

## 2025-08-22 PROCEDURE — 80048 BASIC METABOLIC PNL TOTAL CA: CPT

## 2025-08-22 PROCEDURE — 2500000003 HC RX 250 WO HCPCS: Performed by: NURSE PRACTITIONER

## 2025-08-22 PROCEDURE — 85730 THROMBOPLASTIN TIME PARTIAL: CPT

## 2025-08-22 PROCEDURE — 6370000000 HC RX 637 (ALT 250 FOR IP): Performed by: NURSE PRACTITIONER

## 2025-08-22 PROCEDURE — 1100000000 HC RM PRIVATE

## 2025-08-22 RX ORDER — VANCOMYCIN 1.5 G/300ML
1500 INJECTION, SOLUTION INTRAVENOUS EVERY 24 HOURS
Status: DISCONTINUED | OUTPATIENT
Start: 2025-08-22 | End: 2025-08-23 | Stop reason: HOSPADM

## 2025-08-22 RX ORDER — POTASSIUM CHLORIDE 1500 MG/1
40 TABLET, EXTENDED RELEASE ORAL DAILY
Status: DISCONTINUED | OUTPATIENT
Start: 2025-08-23 | End: 2025-08-23 | Stop reason: HOSPADM

## 2025-08-22 RX ADMIN — PREGABALIN 75 MG: 50 CAPSULE ORAL at 13:20

## 2025-08-22 RX ADMIN — SODIUM CHLORIDE, SODIUM LACTATE, POTASSIUM CHLORIDE, AND CALCIUM CHLORIDE: .6; .31; .03; .02 INJECTION, SOLUTION INTRAVENOUS at 07:18

## 2025-08-22 RX ADMIN — APIXABAN 5 MG: 5 TABLET, FILM COATED ORAL at 07:47

## 2025-08-22 RX ADMIN — PREGABALIN 75 MG: 50 CAPSULE ORAL at 07:46

## 2025-08-22 RX ADMIN — Medication 3 MG: at 20:40

## 2025-08-22 RX ADMIN — CEFTRIAXONE 1000 MG: 1 INJECTION, POWDER, FOR SOLUTION INTRAMUSCULAR; INTRAVENOUS at 13:20

## 2025-08-22 RX ADMIN — PREGABALIN 75 MG: 50 CAPSULE ORAL at 20:39

## 2025-08-22 RX ADMIN — SODIUM CHLORIDE, PRESERVATIVE FREE 10 ML: 5 INJECTION INTRAVENOUS at 07:47

## 2025-08-22 RX ADMIN — APIXABAN 5 MG: 5 TABLET, FILM COATED ORAL at 20:40

## 2025-08-22 RX ADMIN — METOPROLOL TARTRATE 50 MG: 50 TABLET, FILM COATED ORAL at 06:29

## 2025-08-22 RX ADMIN — CEFEPIME 2000 MG: 2 INJECTION, POWDER, FOR SOLUTION INTRAVENOUS at 00:05

## 2025-08-22 RX ADMIN — METOPROLOL TARTRATE 50 MG: 50 TABLET, FILM COATED ORAL at 13:20

## 2025-08-22 RX ADMIN — CLOPIDOGREL BISULFATE 75 MG: 75 TABLET, FILM COATED ORAL at 07:47

## 2025-08-22 RX ADMIN — VANCOMYCIN 1500 MG: 1.5 INJECTION, SOLUTION INTRAVENOUS at 13:21

## 2025-08-22 RX ADMIN — SODIUM CHLORIDE, PRESERVATIVE FREE 10 ML: 5 INJECTION INTRAVENOUS at 20:41

## 2025-08-22 RX ADMIN — METOPROLOL TARTRATE 50 MG: 50 TABLET, FILM COATED ORAL at 17:54

## 2025-08-22 ASSESSMENT — PAIN SCALES - GENERAL
PAINLEVEL_OUTOF10: 0

## 2025-08-23 VITALS
RESPIRATION RATE: 16 BRPM | BODY MASS INDEX: 27.2 KG/M2 | TEMPERATURE: 97.7 F | SYSTOLIC BLOOD PRESSURE: 135 MMHG | OXYGEN SATURATION: 99 % | HEART RATE: 97 BPM | HEIGHT: 70 IN | DIASTOLIC BLOOD PRESSURE: 84 MMHG | WEIGHT: 190 LBS

## 2025-08-23 LAB
ALBUMIN SERPL-MCNC: 3.2 G/DL (ref 3.4–5)
ALBUMIN/GLOB SERPL: 0.9 (ref 1–1.9)
ALP SERPL-CCNC: 213 U/L (ref 45–117)
ALT SERPL-CCNC: 33 U/L (ref 10–50)
ANION GAP SERPL CALC-SCNC: 14 MMOL/L (ref 7–16)
AST SERPL-CCNC: 53 U/L (ref 10–38)
BACTERIA SPEC CULT: NORMAL
BACTERIA SPEC CULT: NORMAL
BASOPHILS # BLD: 0.04 K/UL (ref 0–0.1)
BASOPHILS NFR BLD: 0.5 % (ref 0–2)
BILIRUB SERPL-MCNC: 0.6 MG/DL (ref 0.2–1)
BUN SERPL-MCNC: 9 MG/DL (ref 6–23)
BUN/CREAT SERPL: 9
CALCIUM SERPL-MCNC: 8.9 MG/DL (ref 8.5–10.1)
CHLORIDE SERPL-SCNC: 106 MMOL/L (ref 98–107)
CO2 SERPL-SCNC: 18 MMOL/L (ref 21–32)
CREAT SERPL-MCNC: 1.02 MG/DL (ref 0.6–1.3)
DIFFERENTIAL METHOD BLD: ABNORMAL
EOSINOPHIL # BLD: 0.14 K/UL (ref 0–0.4)
EOSINOPHIL NFR BLD: 1.7 % (ref 0–5)
ERYTHROCYTE [DISTWIDTH] IN BLOOD BY AUTOMATED COUNT: 18.6 % (ref 11.6–14.5)
GLOBULIN SER CALC-MCNC: 3.5 G/DL (ref 2.3–3.5)
GLUCOSE SERPL-MCNC: 91 MG/DL (ref 74–108)
HCT VFR BLD AUTO: 38.8 % (ref 36–48)
HGB BLD-MCNC: 13.5 G/DL (ref 13–16)
IMM GRANULOCYTES # BLD AUTO: 0.02 K/UL (ref 0–0.04)
IMM GRANULOCYTES NFR BLD AUTO: 0.2 % (ref 0–0.5)
LYMPHOCYTES # BLD: 1.72 K/UL (ref 0.9–3.6)
LYMPHOCYTES NFR BLD: 20.9 % (ref 21–52)
MAGNESIUM SERPL-MCNC: 2.2 MG/DL (ref 1.6–2.6)
MCH RBC QN AUTO: 28.5 PG (ref 24–34)
MCHC RBC AUTO-ENTMCNC: 34.8 G/DL (ref 31–37)
MCV RBC AUTO: 82 FL (ref 78–100)
MONOCYTES # BLD: 0.96 K/UL (ref 0.05–1.2)
MONOCYTES NFR BLD: 11.7 % (ref 3–10)
NEUTS SEG # BLD: 5.35 K/UL (ref 1.8–8)
NEUTS SEG NFR BLD: 65 % (ref 40–73)
NRBC # BLD: 0 K/UL (ref 0–0.01)
NRBC BLD-RTO: 0 PER 100 WBC
PLATELET # BLD AUTO: 247 K/UL (ref 135–420)
PMV BLD AUTO: 8.9 FL (ref 9.2–11.8)
POTASSIUM SERPL-SCNC: 3.9 MMOL/L (ref 3.5–5.5)
PROT SERPL-MCNC: 6.7 G/DL (ref 6.4–8.2)
RBC # BLD AUTO: 4.73 M/UL (ref 4.35–5.65)
SERVICE CMNT-IMP: NORMAL
SODIUM SERPL-SCNC: 139 MMOL/L (ref 136–145)
VANCOMYCIN SERPL-MCNC: 13.5 UG/ML (ref 5–40)
WBC # BLD AUTO: 8.2 K/UL (ref 4.6–13.2)

## 2025-08-23 PROCEDURE — 2500000003 HC RX 250 WO HCPCS: Performed by: NURSE PRACTITIONER

## 2025-08-23 PROCEDURE — 80053 COMPREHEN METABOLIC PANEL: CPT

## 2025-08-23 PROCEDURE — 6360000002 HC RX W HCPCS: Performed by: INTERNAL MEDICINE

## 2025-08-23 PROCEDURE — 83735 ASSAY OF MAGNESIUM: CPT

## 2025-08-23 PROCEDURE — 80202 ASSAY OF VANCOMYCIN: CPT

## 2025-08-23 PROCEDURE — 85025 COMPLETE CBC W/AUTO DIFF WBC: CPT

## 2025-08-23 PROCEDURE — 6370000000 HC RX 637 (ALT 250 FOR IP): Performed by: NURSE PRACTITIONER

## 2025-08-23 PROCEDURE — 6370000000 HC RX 637 (ALT 250 FOR IP): Performed by: INTERNAL MEDICINE

## 2025-08-23 RX ADMIN — SODIUM CHLORIDE, PRESERVATIVE FREE 10 ML: 5 INJECTION INTRAVENOUS at 08:54

## 2025-08-23 RX ADMIN — CLOPIDOGREL BISULFATE 75 MG: 75 TABLET, FILM COATED ORAL at 08:39

## 2025-08-23 RX ADMIN — POTASSIUM CHLORIDE 40 MEQ: 1500 TABLET, EXTENDED RELEASE ORAL at 08:39

## 2025-08-23 RX ADMIN — PREGABALIN 75 MG: 50 CAPSULE ORAL at 08:39

## 2025-08-23 RX ADMIN — METOPROLOL TARTRATE 50 MG: 50 TABLET, FILM COATED ORAL at 06:40

## 2025-08-23 RX ADMIN — VANCOMYCIN 1500 MG: 1.5 INJECTION, SOLUTION INTRAVENOUS at 08:50

## 2025-08-23 RX ADMIN — APIXABAN 5 MG: 5 TABLET, FILM COATED ORAL at 08:39

## 2025-08-23 ASSESSMENT — PAIN SCALES - GENERAL: PAINLEVEL_OUTOF10: 0

## 2025-08-24 LAB
BACTERIA SPEC CULT: ABNORMAL
BACTERIA SPEC CULT: ABNORMAL
BACTERIA SPEC CULT: NORMAL
BACTERIA SPEC CULT: NORMAL
GRAM STN SPEC: ABNORMAL
GRAM STN SPEC: ABNORMAL
SERVICE CMNT-IMP: ABNORMAL
SERVICE CMNT-IMP: NORMAL
SERVICE CMNT-IMP: NORMAL

## 2025-08-25 LAB
BACTERIA SPEC CULT: ABNORMAL
BACTERIA SPEC CULT: ABNORMAL
GRAM STN SPEC: ABNORMAL
GRAM STN SPEC: ABNORMAL
SERVICE CMNT-IMP: ABNORMAL

## 2025-08-26 ENCOUNTER — OFFICE VISIT (OUTPATIENT)
Facility: CLINIC | Age: 65
End: 2025-08-26

## 2025-08-26 VITALS
HEART RATE: 104 BPM | SYSTOLIC BLOOD PRESSURE: 110 MMHG | WEIGHT: 190 LBS | HEIGHT: 70 IN | OXYGEN SATURATION: 99 % | DIASTOLIC BLOOD PRESSURE: 70 MMHG | BODY MASS INDEX: 27.2 KG/M2 | RESPIRATION RATE: 16 BRPM | TEMPERATURE: 99.6 F

## 2025-08-26 DIAGNOSIS — I48.91 ATRIAL FIBRILLATION WITH RAPID VENTRICULAR RESPONSE (HCC): ICD-10-CM

## 2025-08-26 DIAGNOSIS — I73.9 PAD (PERIPHERAL ARTERY DISEASE): ICD-10-CM

## 2025-08-26 DIAGNOSIS — I10 PRIMARY HYPERTENSION: ICD-10-CM

## 2025-08-26 DIAGNOSIS — I70.213 ATHEROSCLEROSIS OF NATIVE ARTERY OF BOTH LOWER EXTREMITIES WITH INTERMITTENT CLAUDICATION: ICD-10-CM

## 2025-08-26 DIAGNOSIS — T87.89 NON-HEALING WOUND OF AMPUTATION STUMP (HCC): Primary | ICD-10-CM

## 2025-08-27 LAB
BACTERIA SPEC CULT: NORMAL
BACTERIA SPEC CULT: NORMAL
SERVICE CMNT-IMP: NORMAL
SERVICE CMNT-IMP: NORMAL

## 2025-08-29 ENCOUNTER — TELEPHONE (OUTPATIENT)
Facility: CLINIC | Age: 65
End: 2025-08-29

## 2025-09-02 DIAGNOSIS — Z89.612 STATUS POST ABOVE-KNEE AMPUTATION OF LEFT LOWER EXTREMITY (HCC): ICD-10-CM

## 2025-09-02 DIAGNOSIS — R65.20 SEVERE SEPSIS (HCC): ICD-10-CM

## 2025-09-02 DIAGNOSIS — A41.9 SEVERE SEPSIS (HCC): ICD-10-CM

## 2025-09-02 DIAGNOSIS — I48.0 PAROXYSMAL A-FIB (HCC): ICD-10-CM

## 2025-09-02 DIAGNOSIS — T87.89 NON-HEALING WOUND OF AMPUTATION STUMP (HCC): ICD-10-CM

## 2025-09-02 DIAGNOSIS — I73.9 PAD (PERIPHERAL ARTERY DISEASE): ICD-10-CM

## 2025-09-02 DIAGNOSIS — I50.22 CHRONIC SYSTOLIC HEART FAILURE (HCC): Primary | ICD-10-CM

## (undated) DEVICE — SUTURE PROL SZ 5-0 L36IN NONABSORBABLE BLU L13MM C-1 3/8 8720H

## (undated) DEVICE — TRAY PREP DRY SKIN W/ 5 COMPARTMENT W/ REM BSIN AND FCPS

## (undated) DEVICE — SUTURE GOR TX SZ 5-0 L30IN NONABSORBABLE L12MM TTC-12 3/8 6M10A

## (undated) DEVICE — PROBE VASC 8MHZ WTRPRF

## (undated) DEVICE — SOLUTION IV 1000ML 0.9% SOD CHL

## (undated) DEVICE — BANDAGE PSTE W4INXL10YD ZN OXIDE UNNA BOOT

## (undated) DEVICE — KIT CLN UP BON SECOURS MARYV

## (undated) DEVICE — CATHETER ANGIO AD PED 4FR L65CM GWIRE 0.035IN PERIPH

## (undated) DEVICE — STOCKINETTE,IMPERVIOUS,12X48,STERILE: Brand: MEDLINE

## (undated) DEVICE — SUTURE PERMA-HAND SZ 3-0 L24IN NONABSORBABLE BLK W/O NDL SA74H

## (undated) DEVICE — HI-TORQUE SPARTACORE 14 PERIPHERAL GUIDE WIRE .014 5.0 CM X 300 CM: Brand: SPARTACORE

## (undated) DEVICE — 450 ML BOTTLE OF 0.05% CHLORHEXIDINE GLUCONATE IN 99.95% STERILE WATER FOR IRRIGATION, USP AND APPLICATOR.: Brand: IRRISEPT ANTIMICROBIAL WOUND LAVAGE

## (undated) DEVICE — BANDAGE COBAN 6 IN WND 6INX5YD FOAM

## (undated) DEVICE — Device

## (undated) DEVICE — SWAB CULT LIQ STUART AGR AERB MOD IN BRK SGL RAYON TIP PLAS 220099] BECTON DICKINSON MICRO]

## (undated) DEVICE — TABLE COVER: Brand: CONVERTORS

## (undated) DEVICE — COVER US PRB W15XL120CM W/ GEL RUBBERBAND TAPE STRP FLD GEN

## (undated) DEVICE — INTENDED FOR TISSUE SEPARATION, AND OTHER PROCEDURES THAT REQUIRE A SHARP SURGICAL BLADE TO PUNCTURE OR CUT.: Brand: BARD-PARKER SAFETY BLADES SIZE 11, STERILE

## (undated) DEVICE — SUT PROL 6-0 30IN C1 DA BLU --

## (undated) DEVICE — SUTURE PERMAHAND SZ 2-0 L30IN NONABSORBABLE BLK SILK W/O A305H

## (undated) DEVICE — SOLUTION SURG SCRB 8OZ 4% STRENGTH CHG BTL HIBICLN

## (undated) DEVICE — TAPE UMB 1/8X30IN MP COT WHT --

## (undated) DEVICE — SLIM BODY SKIN STAPLER: Brand: APPOSE ULC

## (undated) DEVICE — PENCIL ES L3M BTTN SWCH S STL HEX LOK BLDE ELECTRD HOLSTER

## (undated) DEVICE — NEEDLE ANGIO 1 WALL ULT SMOOTH 19GAX7CM MERIT AVANCE

## (undated) DEVICE — 3 ML SYRINGE WITH HYPODERMIC SAFETY NEEDLE: Brand: MAGELLAN

## (undated) DEVICE — FOGARTY ARTERIAL EMBOLECTOMY CATHETER 4F 80CM: Brand: FOGARTY

## (undated) DEVICE — DRAPE TWL SURG 16X26IN BLU ORB04] ALLCARE INC]

## (undated) DEVICE — SUTURE NONABSORBABLE MONOFILAMENT 6-0 C-1 1X30 IN PROLENE 8706H

## (undated) DEVICE — GLOVE SURG SZ 7 L11.33IN FNGR THK9.8MIL STRW LTX POLYMER

## (undated) DEVICE — ABDOMINAL PAD: Brand: DERMACEA

## (undated) DEVICE — SOLUTION SCRB 4OZ 4% CHG CLN BASE FOR PT SKIN ANTISEPSIS

## (undated) DEVICE — KERLIX BANDAGE ROLL: Brand: KERLIX

## (undated) DEVICE — SUT SLK 2-0SH 30IN BLK --

## (undated) DEVICE — SUTURE GOR TX SZ 4-0 L30IN NONABSORBABLE L13MM TTC-13 3/8 5M02A

## (undated) DEVICE — 16FR 10ML TEMP SENS SILICONE TOTAL TRAY: Brand: MEDLINE

## (undated) DEVICE — SUTURE PERMAHAND SZ 0 L30IN NONABSORBABLE BLK SILK BRAID A306H

## (undated) DEVICE — GAUZE SPONGES,16 PLY: Brand: CURITY

## (undated) DEVICE — SUTURE MONOCRYL SZ 2-0 L36IN ABSRB UD L36MM CT-1 1/2 CIR Y945H

## (undated) DEVICE — FOGARTY ARTERIAL EMBOLECTOMY CATHETER 3F 80CM: Brand: FOGARTY

## (undated) DEVICE — APPLIER CLP L9.38IN M LIG TI DISP STR RNG HNDL LIGACLP

## (undated) DEVICE — INTENDED FOR TISSUE SEPARATION, AND OTHER PROCEDURES THAT REQUIRE A SHARP SURGICAL BLADE TO PUNCTURE OR CUT.: Brand: BARD-PARKER SAFETY BLADES SIZE 15, STERILE

## (undated) DEVICE — BLANKET WRM W29.9XL79.1IN UP BODY FORC AIR MISTRAL-AIR

## (undated) DEVICE — PACK PROCEDURE SURG VASC CATH 161 MMC LF

## (undated) DEVICE — BNDG,ELSTC,MATRIX,STRL,6"X5YD,LF,HOOK&LP: Brand: MEDLINE

## (undated) DEVICE — DRAPE,REIN 53X77,STERILE: Brand: MEDLINE

## (undated) DEVICE — O.R TOWEL, X-RAY DETECTABLE: Brand: DEROYAL

## (undated) DEVICE — HOOK LOCK LATEX FREE ELASTIC BANDAGE 4INX5YD

## (undated) DEVICE — SURGICAL PREP KIT 59 CC PVP-I 5% ETHYL ALC 62% IOD PREVAIL

## (undated) DEVICE — TRAY,URINE METER,100% SILICONE,16FR10ML: Brand: MEDLINE

## (undated) DEVICE — SUTURE VCRL SZ 2-0 L36IN ABSRB UD L36MM CT-1 1/2 CIR J945H

## (undated) DEVICE — CATHETER ANGIO BER2 038 4 FRX65 CM TEMPO AQUA

## (undated) DEVICE — LOOP VES MAXI RED

## (undated) DEVICE — Z DISCONTINUED BY MEDLINE USE 2711682 TRAY SKIN PREP DRY W/ PREM GLV

## (undated) DEVICE — GLOVE SURG SZ 7.5 L11.73IN FNGR THK9.8MIL STRW LTX POLYMER

## (undated) DEVICE — DECANTER BAG 9": Brand: MEDLINE INDUSTRIES, INC.

## (undated) DEVICE — SUTURE MCRYL SZ 3-0 L27IN ABSRB UD L26MM SH 1/2 CIR Y416H

## (undated) DEVICE — SUTURE MONOCRYL SZ 4-0 L18IN ABSRB UD L19MM PS-2 3/8 CIR PRIM Y496G

## (undated) DEVICE — BLADE ASSEMB CLP HAIR FINE --

## (undated) DEVICE — DRAPE,EXTREMITY,89X128,STERILE: Brand: MEDLINE

## (undated) DEVICE — DERMABOND SKIN ADH 0.7ML -- DERMABOND ADVANCED 12/BX

## (undated) DEVICE — 3M™ IOBAN™ 2 ANTIMICROBIAL INCISE DRAPE 6650EZ: Brand: IOBAN™ 2

## (undated) DEVICE — SPONGE LAP 18X18IN STRL -- 5/PK

## (undated) DEVICE — (D)PREP SKN CHLRAPRP APPL 26ML -- CONVERT TO ITEM 371833

## (undated) DEVICE — 3M™ TEGADERM™ TRANSPARENT FILM DRESSING FRAME STYLE, 1626W, 4 IN X 4-3/4 IN (10 CM X 12 CM), 50/CT 4CT/CASE: Brand: 3M™ TEGADERM™

## (undated) DEVICE — INFUSOR PRSS BG CUF 500ML -- MEDICHOICE

## (undated) DEVICE — 3M™ STERI-STRIP™ COMPOUND BENZOIN TINCTURE 40 BAGS/CARTON 4 CARTONS/CASE C1544: Brand: 3M™ STERI-STRIP™

## (undated) DEVICE — GUIDEWIRE WITH ICE™ HYDROPHILIC COATING: Brand: V-18™ CONTROL WIRE™

## (undated) DEVICE — RADIFOCUS GLIDEWIRE: Brand: GLIDEWIRE

## (undated) DEVICE — SUTURE MCRYL SZ 5-0 L18IN ABSRB UD L19MM PS-2 3/8 CIR PRIM Y495G

## (undated) DEVICE — DRSG VAC ASST CLSR GRNUFM SM --

## (undated) DEVICE — PACK PROCEDURE SURG MAJ W/ BASIN LF

## (undated) DEVICE — DRESSING NEG PRSS SM 10X7.5X3.3CM POLYUR FOR WND THER VAC

## (undated) DEVICE — SUTURE PDS II SZ 2-0 L27IN ABSRB VLT L36MM CT-1 1/2 CIR Z339H

## (undated) DEVICE — CATHETER IV 14GA L1.25IN ORNG POLY SFTY SYS FULL ENCASED

## (undated) DEVICE — RADIFOCUS GLIDEWIRE ADVANTAGE GUIDEWIRE: Brand: GLIDEWIRE ADVANTAGE

## (undated) DEVICE — CATH URETH INTMIT ROB 14FR FUN -- USE ITEM 179521

## (undated) DEVICE — PREP SKN CHLRAPRP APL 26ML STR --

## (undated) DEVICE — 4F 80 CM NOVASIL SILICONE SINGLE LUMEN EMBOLECTOMY CATHETER, EIFU: Brand: LEMAITRE EMBOLECTOMY CATHETER

## (undated) DEVICE — 3M™ IOBAN™ 2 ANTIMICROBIAL INCISE DRAPE 6651EZ: Brand: IOBAN™ 2

## (undated) DEVICE — GUIDEWIRE VASC L180CM DIA0.035IN L15CM STR TIP PTFE S STL

## (undated) DEVICE — INFLATION DEVICE: Brand: ENCORE 26

## (undated) DEVICE — INTRODUCER SHTH 5FR CANN L11CM DIL TIP 25MM GRY TUNGSTEN

## (undated) DEVICE — NEEDLE HYPO 25GA L1.5IN BVL ORIENTED ECLIPSE

## (undated) DEVICE — INTRODUCER SHTH 4FR CANN L11CM DIL TIP 25MM RED TUNGSTEN

## (undated) DEVICE — BANDAGE COMPR W4INXL5YD BGE COHESIVE SELF ADH ADBAN CBN1104] AVCOR HEALTHCARE PRODUCTS INC]

## (undated) DEVICE — BLANKET WRM AD W50XL85.8IN PACU FULL BODY FORC AIR

## (undated) DEVICE — SUTURE VCRL SZ 4-0 L27IN ABSRB UD L19MM PS-2 3/8 CIR PRIM J426H

## (undated) DEVICE — BANDAGE COMPR SGL LAYERED CLP CLSR ELAS 15FT LEN 6IN W

## (undated) DEVICE — SYRINGE MED 20ML STD CLR PLAS LUERLOCK TIP N CTRL DISP

## (undated) DEVICE — GAUZE,SPONGE,4"X4",16PLY,STRL,LF,10/TRAY: Brand: MEDLINE

## (undated) DEVICE — PREVENA PEEL & PLACE SYSTEM KIT- 13 CM: Brand: PREVENA™ PEEL & PLACE™

## (undated) DEVICE — SUT SLK 0 30IN SH BLK --

## (undated) DEVICE — DRAPE,TOP,102X53,STERILE: Brand: MEDLINE

## (undated) DEVICE — 1LYRTR 16FR10ML100%SILI UMSNAP: Brand: MEDLINE INDUSTRIES, INC.

## (undated) DEVICE — GAUZE SPONGES,USP TYPE VII GAUZE, 12 PLY: Brand: CURITY

## (undated) DEVICE — SHEATH 5FR 11CM BRITETIP

## (undated) DEVICE — MICROPUNCTURE INTRODUCER SET SILHOUETTE TRANSITIONLESS WITH STAINLESS STEEL WIRE GUIDE: Brand: MICROPUNCTURE

## (undated) DEVICE — INTRODUCER SHTH 6FR CANN L11CM DIL TIP 35MM GRN TUNGSTEN

## (undated) DEVICE — DEVICE STBL AD TRICOT ANCHR PD FOR 3 W F CATH STATLOK

## (undated) DEVICE — (D)SYR 10ML 1/5ML GRAD NSAF -- PKGING CHANGE USE ITEM 338027

## (undated) DEVICE — SUTURE MCRYL SZ 4 0 L18IN ABSRB VLT PS 1 L24MM 3 8 CIR REV Y682H

## (undated) DEVICE — SUTURE NONABSORBABLE MONOFILAMENT 3-0 PS-1 18 IN BLK ETHILON 1663H

## (undated) DEVICE — DRESSING,GAUZE,XEROFORM,CURAD,1"X8",ST: Brand: CURAD

## (undated) DEVICE — SUTURE VCRL SZ 2-0 L27IN ABSRB UD L26MM SH 1/2 CIR J417H

## (undated) DEVICE — MAYO STAND COVER: Brand: CONVERTORS

## (undated) DEVICE — SPONGE HEMOSTAT CELLULS 4X8IN -- SURGICEL

## (undated) DEVICE — OCCLUSIVE GAUZE STRIP,3% BISMUTH TRIBROMOPHENATE IN PETROLATUM BLEND: Brand: XEROFORM

## (undated) DEVICE — APPLIER CLP AUTO MED 9.75 IN TI SURGCLP SUPER INTLOK 20 DISP

## (undated) DEVICE — 3M™ DURAPORE™ SURGICAL TAPE 1538-1, 1 INCH X 10 YARD (2,5CM X 9,1M), 12 ROLLS/BOX: Brand: 3M™ DURAPORE™

## (undated) DEVICE — CATH DIAG 4/ BERN/65/035 -- IMAGER II 31-609

## (undated) DEVICE — SUTURE ABSORBABLE BRAIDED 2-0 CT-1 27 IN UD VICRYL J259H

## (undated) DEVICE — INTRO SHTH AVNT + 4FRX11CM -- AVANTI+ - ORDER AS EA

## (undated) DEVICE — SYSTEM INF CTRL

## (undated) DEVICE — INTENDED FOR TISSUE SEPARATION, AND OTHER PROCEDURES THAT REQUIRE A SHARP SURGICAL BLADE TO PUNCTURE OR CUT.: Brand: BARD-PARKER ® STAINLESS STEEL BLADES

## (undated) DEVICE — KIT OR TURNOVER

## (undated) DEVICE — ELECTRODE PT RET AD L9FT HI MOIST COND ADH HYDRGEL CORDED

## (undated) DEVICE — 2108 SERIES SAGITTAL BLADE (24.8 X 1.24 X 80.1MM)

## (undated) DEVICE — COPILOT KIT INCLUDES BLEEDBACK CONTROL VALVE 20/30 INDEFLATOR INFLATION DEVICE 30 ATM 20 CC / GUIDE WIRE INTRODUCER / TORQUE DEVICE: Brand: INDEFLATOR

## (undated) DEVICE — 3M™ COBAN™ NL STERILE NON-LATEX SELF-ADHERENT WRAP, 2086S, 6 IN X 5 YD (15 CM X 4,5 M), 12 ROLLS/CASE: Brand: 3M™ COBAN™

## (undated) DEVICE — SUTURE VCRL SZ 3-0 L27IN ABSRB UD L26MM SH 1/2 CIR J416H

## (undated) DEVICE — CONN Y DRAIN VAC ATS --

## (undated) DEVICE — PTA BALLOON DILATATION CATHETER: Brand: MUSTANG™

## (undated) DEVICE — SYSTEM TISS GLUE 5ML CONTAIN SYR PLUNG STD SYR TIP 12MM 5PKS/EA

## (undated) DEVICE — DEVICE INFLATION EAGLE 20ML

## (undated) DEVICE — BLADE CLIPPER GEN PURP NS

## (undated) DEVICE — INFLATION DEVICE: Brand: ENCORE™ 26

## (undated) DEVICE — SUTURE PROL SZ 7-0 L30IN NONABSORBABLE BLU L9.3MM BV-1 1/2 8703H

## (undated) DEVICE — SUTURE MONOCRYL SZ 3-0 L27IN ABSRB UD L26MM SH 1/2 CIR Y416H

## (undated) DEVICE — NEPTUNE E-SEP SMOKE EVACUATION PENCIL, COATED, 70MM BLADE, PUSH BUTTON SWITCH: Brand: NEPTUNE E-SEP

## (undated) DEVICE — 3M™ STERI-DRAPE™ U-DRAPE 1015: Brand: STERI-DRAPE™

## (undated) DEVICE — SUTURE MCRYL SZ 4-0 L18IN ABSRB UD L19MM PS-2 3/8 CIR PRIM Y496G

## (undated) DEVICE — SYS INCISION MANAGEMENT -- PREVENA

## (undated) DEVICE — SUT PROL 2-0 30IN SH BLU --

## (undated) DEVICE — SUTURE MCRYL SZ 2-0 L36IN ABSRB UD L36MM CT-1 1/2 CIR Y945H

## (undated) DEVICE — CATH IV STR 20GX1IN PNK -- PROTECTIV PLUS

## (undated) DEVICE — PTA BALLOON DILATATION CATHETER: Brand: STERLING™

## (undated) DEVICE — GUIDEWIRE VASC L180CM DIA0.035IN L4CM DIA1.5MM J TIP PTFE S

## (undated) DEVICE — PREMIUM DRY TRAY LF: Brand: MEDLINE INDUSTRIES, INC.

## (undated) DEVICE — PAD,ABDOMINAL,8"X10",ST,LF: Brand: MEDLINE

## (undated) DEVICE — SYR LR LCK 1ML GRAD NSAF 30ML --

## (undated) DEVICE — SKIN PREP TRAY 4 COMPARTM TRAY: Brand: MEDLINE INDUSTRIES, INC.

## (undated) DEVICE — PREVENA INCISION MANAGEMENT SYSTEM- PEEL & PLACE DRESSING: Brand: PREVENA™ PEEL & PLACE™

## (undated) DEVICE — TOWEL,OR,DSP,ST,WHITE,DLX,XR,4/PK,20PK/C: Brand: MEDLINE

## (undated) DEVICE — SHEET,DRAPE,70X100,STERILE: Brand: MEDLINE

## (undated) DEVICE — INTENDED FOR TISSUE SEPARATION, AND OTHER PROCEDURES THAT REQUIRE A SHARP SURGICAL BLADE TO PUNCTURE OR CUT.: Brand: BARD-PARKER SAFETY BLADES SIZE 10, STERILE

## (undated) DEVICE — 3M™ WARMING BLANKET, UPPER BODY, 10 PER CASE, 42268: Brand: BAIR HUGGER™

## (undated) DEVICE — SHEATH 6FR 11CM BRITETIP

## (undated) DEVICE — GLOVE SURG BIOGEL 8.0 STRL -- SKINSENSE

## (undated) DEVICE — REM POLYHESIVE ADULT PATIENT RETURN ELECTRODE: Brand: VALLEYLAB

## (undated) DEVICE — GARMENT,MEDLINE,DVT,INT,CALF,MED, GEN2: Brand: MEDLINE

## (undated) DEVICE — SUTURE PERMA-HAND SZ 2-0 L24IN NONABSORBABLE BLK W/O NDL SA75H

## (undated) DEVICE — CURITY NON-ADHERENT STRIPS: Brand: CURITY

## (undated) DEVICE — INTRODUCER SHTH 7FR CANN L11CM DIL TIP 35MM ORNG TUNGSTEN

## (undated) DEVICE — BANDAGE,GAUZE,BULKEE LITE,3"X4.1YD,STRL: Brand: MEDLINE

## (undated) DEVICE — BNDG,ELSTC,MATRIX,STRL,4"X5YD,LF,HOOK&LP: Brand: MEDLINE

## (undated) DEVICE — (D)STRIP SKN CLSR 0.5X4IN WHT --

## (undated) DEVICE — ADHESIVE SKN CLSR HI VISC 2-O --

## (undated) DEVICE — SUT ETHBND 2-0 30IN SH GRN --

## (undated) DEVICE — SUTURE PERMA-HAND SZ 2-0 L30IN NONABSORBABLE BLK L26MM SH K833H

## (undated) DEVICE — BANDAGE COBAN 4 IN COMPR W4INXL5YD FOAM COHESIVE QUIK STK SELF ADH SFT

## (undated) DEVICE — TRAY CATH OD16FR SIL URIN M STATLOK STBL DEV SURSTP

## (undated) DEVICE — COVER LT HNDL UNIV PUR FLX DISP 2 PER PK

## (undated) DEVICE — HEX-LOCKING BLADE ELECTRODE: Brand: EDGE

## (undated) DEVICE — SUTURE VCRL SZ 4-0 L18IN ABSRB UD L19MM PC-5 3/8 CIR J823G

## (undated) DEVICE — KIT CATH 7FR L16CM CTRL VEN POLYUR 3 LUMN PRSS INJ BLU

## (undated) DEVICE — INTENDED FOR TISSUE SEPARATION, AND OTHER PROCEDURES THAT REQUIRE A SHARP SURGICAL BLADE TO PUNCTURE OR CUT.: Brand: BARD-PARKER ® CARBON RIB-BACK BLADES

## (undated) DEVICE — CONN SUC TUBE Y VAC ASST CLSR --

## (undated) DEVICE — DRAPE XR C ARM 41X74IN LF --

## (undated) DEVICE — 3M™ BAIR PAWS FLEX™ WARMING GOWN, STANDARD, 20 PER CASE 81003: Brand: BAIR PAWS™

## (undated) DEVICE — SUTURE VICRYL SZ 0 L36IN ABSRB UD L36MM CT-1 1/2 CIR J946H

## (undated) DEVICE — APPLICATOR MEDICATED 26 CC SOLUTION HI LT ORNG CHLORAPREP

## (undated) DEVICE — SUT PROL 3-0 36IN V7 DA BLU --

## (undated) DEVICE — SURGIFOAM SPNG SZ 12-7

## (undated) DEVICE — SUTURE ETHBND EXCEL SZ 0 L18IN NONABSORBABLE GRN L36MM CT-1 CX21D

## (undated) DEVICE — AGENT HEMSTAT 1GM PORCINE GEL ABSRB PWD FOR CONT OOZING

## (undated) DEVICE — STERILE HOOK LOCK LATEX FREE ELASTIC BANDAGE 4INX5YD: Brand: HOOK LOCK™

## (undated) DEVICE — GDWIRE TAPR STR 0.035INX180CM --

## (undated) DEVICE — APPLIER CLP L9.375IN APER 2.1MM CLS L3.8MM 20 SM TI CLP

## (undated) DEVICE — LIQUIBAND RAPID ADHESIVE 36/CS 0.8ML: Brand: MEDLINE

## (undated) DEVICE — CANISTER WND VAC ASST CLSR --

## (undated) DEVICE — 3M™ WARMING BLANKET, LOWER BODY, 10 PER CASE, 42568: Brand: BAIR HUGGER™

## (undated) DEVICE — BLADE ES L2.75IN ELASTOMERIC COAT DURABLE BEND UPTO 90DEG

## (undated) DEVICE — GAUZE,BORDER,4"X4",2.5"X2.5"PAD,STERILE: Brand: MEDLINE

## (undated) DEVICE — 3M™ TEGADERM™ TRANSPARENT FILM DRESSING FRAME STYLE, 1626, 4 IN X 4-3/4 IN (10 CM X 12 CM), 50/CT 4CT/CASE: Brand: 3M™ TEGADERM™

## (undated) DEVICE — KIT NEG PRSS DSG M W4.92XH1.3XL7.09IN SIL POLYUR FOAM

## (undated) DEVICE — STERILE POLYISOPRENE POWDER-FREE SURGICAL GLOVES: Brand: PROTEXIS

## (undated) DEVICE — SHEET, DRAPE, SPLIT, STERILE: Brand: MEDLINE

## (undated) DEVICE — Z DISCONTINUED USE 2423037 APPLICATOR MEDICATED 3 CC CHLORHEXIDINE GLUC 2% CHLORAPREP

## (undated) DEVICE — RADIFOCUS TORQUE DEVICE MULTI-TORQUE VISE: Brand: RADIFOCUS TORQUE DEVICE

## (undated) DEVICE — BSHR MAJOR BASIN PACK-LF: Brand: MEDLINE INDUSTRIES, INC.

## (undated) DEVICE — PTA BALLOON DILATATION CATHETER: Brand: STERLING® SL

## (undated) DEVICE — DESTINATION RENAL GUIDING SHEATH: Brand: DESTINATION

## (undated) DEVICE — SOFT SILICONE HYDROCELLULAR SACRUM DRESSING WITH LOCK AWAY LAYER: Brand: ALLEVYN LIFE SACRUM (LARGE) PACK OF 10

## (undated) DEVICE — NDL PRT INJ NSAF BLNT 18GX1.5 --

## (undated) DEVICE — GAUZE,SPONGE,8"X4",12PLY,XRAY,STRL,LF: Brand: MEDLINE

## (undated) DEVICE — THREE-QUARTER SHEET: Brand: CONVERTORS

## (undated) DEVICE — SHOE POSTOP PREMIER PRO M RUB SOLE HK AND LOOP CLSR NYL L

## (undated) DEVICE — SUTURE N ABSRB L 36 IN SZ 5-0 NDL L 13 MM POLYPRO OR PPL BLU

## (undated) DEVICE — STERILE SURGICAL LUBRICANT,  TUBE: Brand: SURGILUBE

## (undated) DEVICE — 3M(TM) MEDIPORE(TM) +PAD SOFT CLOTH ADHESIVE WOUND DRESSING 3569: Brand: 3M™ MEDIPORE™

## (undated) DEVICE — Z DUPLICATE USE 2624755 KIT NEG PRSS DSG W/ PRSS INDIC PTCH STRP 45ML CANSTR CARR

## (undated) DEVICE — SUTURE PDS II SZ 1 L96IN ABSRB VLT TP-1 L65MM 1/2 CIR Z880G

## (undated) DEVICE — COVADERM: Brand: DEROYAL

## (undated) DEVICE — 48" PROBE COVER W/GEL, ULTRASOUND, STERILE: Brand: SITE-RITE

## (undated) DEVICE — GLOVE SURG SZ 8 L11.77IN FNGR THK9.8MIL STRW LTX POLYMER

## (undated) DEVICE — SET CATH 20GA L1.75IN RAD ART POLYUR RADPQ W/ INTEGR

## (undated) DEVICE — SUT PROL 2-0 30IN CT1 BLU --

## (undated) DEVICE — DRESSING NEG PRSS M 18X12.5X3.3CM POLYUR FOR WND THER VAC

## (undated) DEVICE — FOGARTY ARTERIAL EMBOLECTOMY CATHETER 4F 40CM: Brand: FOGARTY

## (undated) DEVICE — 3M™ MICROPORE™ SURGICAL TAPE, 1530-3: Brand: 3M™ MICROPORE™

## (undated) DEVICE — COVER LT HNDL FLX

## (undated) DEVICE — FLEX ADVANTAGE 3000CC: Brand: FLEX ADVANTAGE

## (undated) DEVICE — ANGIOGRAPHY KIT CUST VASC

## (undated) DEVICE — (D)GEL US MEDIA TRNSMITAQSNC -- DISCK FROM MFR

## (undated) DEVICE — SUTURE COAT VCRL PC 5 PRECIS COSM CONVENTIONAL CUT PRIM 3 8 J823H

## (undated) DEVICE — SUTURE MCRYL 3-0 L27IN ABSRB VLT SH L26MM 1/2 CIR Y316H

## (undated) DEVICE — SYR 10ML LUER LOK 1/5ML GRAD --

## (undated) DEVICE — FOGARTY ARTERIAL EMBOLECTOMY CATHETER 3F 40CM: Brand: FOGARTY

## (undated) DEVICE — DRESSING,GAUZE,OIL EMLSN,CURAD,3X8",1/PK: Brand: CURAD

## (undated) DEVICE — CANISTER SUC GEL INFOVAC 500ML --

## (undated) DEVICE — GUIDEWIRE VASC L180CM DIA0035IN L15CM STR TIP PTFE HEP S STL

## (undated) DEVICE — BANDAGE,GAUZE,BULKEE II,4.5"X4.1YD,STRL: Brand: MEDLINE

## (undated) DEVICE — AGENT HEMSTAT W3XL4IN OXIDIZED REGENERATED CELOS ABSRB FOR

## (undated) DEVICE — SUTURE PROL 5 0 L36IN NONABSORBABLE C 1 DBL ARMED MFIL

## (undated) DEVICE — SUTURE PROL 4-0 L36IN NONABSORBABLE BLU V-7 L26MM 1/2 CIR 8975H

## (undated) DEVICE — GOWN,REINFORCED,POLY,AURORA,XLARGE,STRL: Brand: MEDLINE

## (undated) DEVICE — PRECISION THIN (9.0 X 0.38 X 25.0MM)

## (undated) DEVICE — (D)PREP TY PVP SCRB 4OZ PAINT -- DISC BY MFR USE ITEM 102120

## (undated) DEVICE — DRSG VAC ASST CLSR GRNUFM MED -- 18X12.5X3.2CM

## (undated) DEVICE — PRESSURE MONITORING SET: Brand: TRUWAVE

## (undated) DEVICE — DRAPE,UNDERBUTTOCKS,PCH,STERILE: Brand: MEDLINE

## (undated) DEVICE — TRAY PREP DRY W/ PREM GLV 2 APPL 6 SPNG 2 UNDPD 1 OVERWRAP

## (undated) DEVICE — APPLIER CLP M L11IN TI MULT RNG HNDL 30 CLP STR LIGACLP

## (undated) DEVICE — CULTURETTE SGL EVAC TUBE PALL -- 100/CA

## (undated) DEVICE — GDWIRE URET STR STD .038X150 -- ZIPWIRE STD

## (undated) DEVICE — Z DISCONTINUED USE (MFG CAT 7984-37) SOLUTION IV SODIUM CHL 0.9% 100 ML INJ

## (undated) DEVICE — MEDI-VAC SUCTION HIGH CAPACITY: Brand: CARDINAL HEALTH

## (undated) DEVICE — 3M(TM) MEDIPORE(TM) +PAD SOFT CLOTH ADHESIVE WOUND DRESSING 3566: Brand: 3M™ MEDIPORE™

## (undated) DEVICE — SOLUTION IRRIG 1000ML H2O STRL BLT

## (undated) DEVICE — SUT PROL 4-0 36IN V4 DA BLU --

## (undated) DEVICE — ARMADA 18 PTA CATHETER 2.5 MM X 200 MM X 150 CM / OVER-THE-WIRE: Brand: ARMADA

## (undated) DEVICE — BINDER ABD H12IN FOR 62-74IN WAIST UNIV 4 PNL PREM DSGN E

## (undated) DEVICE — SET FLD ADMIN 3 W STPCOCK FIX FEM L BOR 1IN

## (undated) DEVICE — EXTRACTOR STPL L10CM REMV SKIN CLSR STPL SQUEEZE HNDL PROX